# Patient Record
Sex: MALE | Race: OTHER | HISPANIC OR LATINO | URBAN - METROPOLITAN AREA
[De-identification: names, ages, dates, MRNs, and addresses within clinical notes are randomized per-mention and may not be internally consistent; named-entity substitution may affect disease eponyms.]

---

## 2019-12-09 ENCOUNTER — EMERGENCY (EMERGENCY)
Facility: HOSPITAL | Age: 74
LOS: 1 days | Discharge: HOME | End: 2019-12-09
Attending: EMERGENCY MEDICINE
Payer: COMMERCIAL

## 2019-12-09 VITALS
DIASTOLIC BLOOD PRESSURE: 73 MMHG | RESPIRATION RATE: 19 BRPM | SYSTOLIC BLOOD PRESSURE: 148 MMHG | TEMPERATURE: 98 F | HEART RATE: 89 BPM | OXYGEN SATURATION: 99 %

## 2019-12-09 VITALS
WEIGHT: 199.96 LBS | HEIGHT: 68 IN | TEMPERATURE: 98 F | SYSTOLIC BLOOD PRESSURE: 135 MMHG | HEART RATE: 82 BPM | OXYGEN SATURATION: 97 % | DIASTOLIC BLOOD PRESSURE: 78 MMHG | RESPIRATION RATE: 18 BRPM

## 2019-12-09 DIAGNOSIS — Z95.1 PRESENCE OF AORTOCORONARY BYPASS GRAFT: Chronic | ICD-10-CM

## 2019-12-09 DIAGNOSIS — Z98.89 OTHER SPECIFIED POSTPROCEDURAL STATES: Chronic | ICD-10-CM

## 2019-12-09 PROCEDURE — 99284 EMERGENCY DEPT VISIT MOD MDM: CPT

## 2019-12-09 PROCEDURE — 72125 CT NECK SPINE W/O DYE: CPT | Mod: 26

## 2019-12-09 PROCEDURE — 70486 CT MAXILLOFACIAL W/O DYE: CPT | Mod: 26

## 2019-12-09 PROCEDURE — 72125 CT NECK SPINE W/O DYE: CPT

## 2019-12-09 PROCEDURE — 70450 CT HEAD/BRAIN W/O DYE: CPT | Mod: 26

## 2019-12-09 PROCEDURE — 70450 CT HEAD/BRAIN W/O DYE: CPT

## 2019-12-09 PROCEDURE — 70486 CT MAXILLOFACIAL W/O DYE: CPT

## 2019-12-09 PROCEDURE — 96372 THER/PROPH/DIAG INJ SC/IM: CPT

## 2019-12-09 PROCEDURE — 99284 EMERGENCY DEPT VISIT MOD MDM: CPT | Mod: 25

## 2019-12-09 RX ORDER — KETOROLAC TROMETHAMINE 30 MG/ML
15 SYRINGE (ML) INJECTION ONCE
Refills: 0 | Status: DISCONTINUED | OUTPATIENT
Start: 2019-12-09 | End: 2019-12-09

## 2019-12-09 RX ORDER — DIAZEPAM 5 MG
2 TABLET ORAL ONCE
Refills: 0 | Status: DISCONTINUED | OUTPATIENT
Start: 2019-12-09 | End: 2019-12-09

## 2019-12-09 RX ADMIN — Medication 15 MILLIGRAM(S): at 13:38

## 2019-12-09 NOTE — ED ADULT NURSE NOTE - NSIMPLEMENTINTERV_GEN_ALL_ED
Implemented All Fall with Harm Risk Interventions:  [place] to call system. Call bell, personal items and telephone within reach. Instruct patient to call for assistance. Room bathroom lighting operational. Non-slip footwear when patient is off stretcher. Physically safe environment: no spills, clutter or unnecessary equipment. Stretcher in lowest position, wheels locked, appropriate side rails in place. Provide visual cue, wrist band, yellow gown, etc. Monitor gait and stability. Monitor for mental status changes and reorient to person, place, and time. Review medications for side effects contributing to fall risk. Reinforce activity limits and safety measures with patient and family. Provide visual clues: red socks.

## 2019-12-09 NOTE — ED PROVIDER NOTE - PATIENT PORTAL LINK FT
You can access the FollowMyHealth Patient Portal offered by [facility] by registering at the following website: [email or web address]. By joining [employer]’s FollowMyHealth portal, you will also be able to view your health information using other applications (apps) compatible with our system.

## 2019-12-09 NOTE — ED PROVIDER NOTE - OBJECTIVE STATEMENT
73 yo male with HTN HLD DM CABG poor dentition Afib on Plavix presents with jaw pain.  Pt states he tripped and fell and hit the left side of his face on a pole.  He denies No headache, nausea, vomiting, confusion, amnesia, but states he has pain when he eats and bites.

## 2019-12-09 NOTE — ED ADULT NURSE NOTE - OBJECTIVE STATEMENT
Patient present to Ed with c/o left side facial, jaw and ear pain since thursday. As per daughter as bedside, patient hit the left side of his face on rail in bathroom on thursday since then he has been having pain when chewing

## 2019-12-09 NOTE — ED ADULT TRIAGE NOTE - CHIEF COMPLAINT QUOTE
brought by daughter c/o pain  to Left  jaw , pain when chewing .reports hit Left side of the face to a [place] railing in a [employer] 4 days ago

## 2019-12-09 NOTE — ED ADULT NURSE NOTE - ED STAT RN HANDOFF DETAILS
Patient discharged home as per MD order. All discharge instructions and F/U visits provided to patient and family. Prescription sent to pharmacy. Both verbalizes understanding leaving ambulatory in no acute distress.

## 2021-01-14 ENCOUNTER — EMERGENCY (EMERGENCY)
Facility: HOSPITAL | Age: 76
LOS: 1 days | Discharge: HOME | End: 2021-01-14
Attending: EMERGENCY MEDICINE
Payer: COMMERCIAL

## 2021-01-14 VITALS
SYSTOLIC BLOOD PRESSURE: 203 MMHG | HEIGHT: 68 IN | RESPIRATION RATE: 20 BRPM | OXYGEN SATURATION: 96 % | DIASTOLIC BLOOD PRESSURE: 107 MMHG | WEIGHT: 220.02 LBS | HEART RATE: 91 BPM | TEMPERATURE: 98 F

## 2021-01-14 VITALS
OXYGEN SATURATION: 98 % | HEART RATE: 94 BPM | DIASTOLIC BLOOD PRESSURE: 80 MMHG | TEMPERATURE: 98 F | RESPIRATION RATE: 18 BRPM | SYSTOLIC BLOOD PRESSURE: 138 MMHG

## 2021-01-14 DIAGNOSIS — Z95.1 PRESENCE OF AORTOCORONARY BYPASS GRAFT: Chronic | ICD-10-CM

## 2021-01-14 DIAGNOSIS — Z98.89 OTHER SPECIFIED POSTPROCEDURAL STATES: Chronic | ICD-10-CM

## 2021-01-14 LAB
ALBUMIN SERPL ELPH-MCNC: 3.4 G/DL — LOW (ref 3.5–5)
ALP SERPL-CCNC: 216 U/L — HIGH (ref 40–120)
ALT FLD-CCNC: 24 U/L DA — SIGNIFICANT CHANGE UP (ref 10–60)
ANION GAP SERPL CALC-SCNC: 10 MMOL/L — SIGNIFICANT CHANGE UP (ref 5–17)
APTT BLD: 30.5 SEC — SIGNIFICANT CHANGE UP (ref 27.5–35.5)
AST SERPL-CCNC: 17 U/L — SIGNIFICANT CHANGE UP (ref 10–40)
BILIRUB SERPL-MCNC: 0.5 MG/DL — SIGNIFICANT CHANGE UP (ref 0.2–1.2)
BUN SERPL-MCNC: 20 MG/DL — HIGH (ref 7–18)
CALCIUM SERPL-MCNC: 8.9 MG/DL — SIGNIFICANT CHANGE UP (ref 8.4–10.5)
CHLORIDE SERPL-SCNC: 97 MMOL/L — SIGNIFICANT CHANGE UP (ref 96–108)
CO2 SERPL-SCNC: 24 MMOL/L — SIGNIFICANT CHANGE UP (ref 22–31)
CREAT SERPL-MCNC: 1.74 MG/DL — HIGH (ref 0.5–1.3)
GLUCOSE SERPL-MCNC: 360 MG/DL — HIGH (ref 70–99)
HCT VFR BLD CALC: 41 % — SIGNIFICANT CHANGE UP (ref 39–50)
HGB BLD-MCNC: 13.8 G/DL — SIGNIFICANT CHANGE UP (ref 13–17)
INR BLD: 0.95 RATIO — SIGNIFICANT CHANGE UP (ref 0.88–1.16)
MCHC RBC-ENTMCNC: 29.2 PG — SIGNIFICANT CHANGE UP (ref 27–34)
MCHC RBC-ENTMCNC: 33.7 GM/DL — SIGNIFICANT CHANGE UP (ref 32–36)
MCV RBC AUTO: 86.7 FL — SIGNIFICANT CHANGE UP (ref 80–100)
NRBC # BLD: 0 /100 WBCS — SIGNIFICANT CHANGE UP (ref 0–0)
PLATELET # BLD AUTO: 191 K/UL — SIGNIFICANT CHANGE UP (ref 150–400)
POTASSIUM SERPL-MCNC: 4.3 MMOL/L — SIGNIFICANT CHANGE UP (ref 3.5–5.3)
POTASSIUM SERPL-SCNC: 4.3 MMOL/L — SIGNIFICANT CHANGE UP (ref 3.5–5.3)
PROT SERPL-MCNC: 7.7 G/DL — SIGNIFICANT CHANGE UP (ref 6–8.3)
PROTHROM AB SERPL-ACNC: 11.3 SEC — SIGNIFICANT CHANGE UP (ref 10.6–13.6)
RBC # BLD: 4.73 M/UL — SIGNIFICANT CHANGE UP (ref 4.2–5.8)
RBC # FLD: 12.4 % — SIGNIFICANT CHANGE UP (ref 10.3–14.5)
SARS-COV-2 RNA SPEC QL NAA+PROBE: SIGNIFICANT CHANGE UP
SODIUM SERPL-SCNC: 131 MMOL/L — LOW (ref 135–145)
TROPONIN I SERPL-MCNC: 0.02 NG/ML — SIGNIFICANT CHANGE UP (ref 0–0.04)
TROPONIN I SERPL-MCNC: 0.02 NG/ML — SIGNIFICANT CHANGE UP (ref 0–0.04)
WBC # BLD: 5.6 K/UL — SIGNIFICANT CHANGE UP (ref 3.8–10.5)
WBC # FLD AUTO: 5.6 K/UL — SIGNIFICANT CHANGE UP (ref 3.8–10.5)

## 2021-01-14 PROCEDURE — 82962 GLUCOSE BLOOD TEST: CPT

## 2021-01-14 PROCEDURE — 86900 BLOOD TYPING SEROLOGIC ABO: CPT

## 2021-01-14 PROCEDURE — 85027 COMPLETE CBC AUTOMATED: CPT

## 2021-01-14 PROCEDURE — U0005: CPT

## 2021-01-14 PROCEDURE — 85610 PROTHROMBIN TIME: CPT

## 2021-01-14 PROCEDURE — 85730 THROMBOPLASTIN TIME PARTIAL: CPT

## 2021-01-14 PROCEDURE — 86850 RBC ANTIBODY SCREEN: CPT

## 2021-01-14 PROCEDURE — 84484 ASSAY OF TROPONIN QUANT: CPT

## 2021-01-14 PROCEDURE — 87635 SARS-COV-2 COVID-19 AMP PRB: CPT

## 2021-01-14 PROCEDURE — 86901 BLOOD TYPING SEROLOGIC RH(D): CPT

## 2021-01-14 PROCEDURE — 71045 X-RAY EXAM CHEST 1 VIEW: CPT | Mod: 26

## 2021-01-14 PROCEDURE — 93005 ELECTROCARDIOGRAM TRACING: CPT

## 2021-01-14 PROCEDURE — 71045 X-RAY EXAM CHEST 1 VIEW: CPT

## 2021-01-14 PROCEDURE — 80053 COMPREHEN METABOLIC PANEL: CPT

## 2021-01-14 PROCEDURE — 99285 EMERGENCY DEPT VISIT HI MDM: CPT

## 2021-01-14 PROCEDURE — 99283 EMERGENCY DEPT VISIT LOW MDM: CPT | Mod: 25

## 2021-01-14 PROCEDURE — 36415 COLL VENOUS BLD VENIPUNCTURE: CPT

## 2021-01-14 RX ORDER — METFORMIN HYDROCHLORIDE 850 MG/1
1 TABLET ORAL
Qty: 0 | Refills: 0 | DISCHARGE
Start: 2021-01-14 | End: 2021-02-12

## 2021-01-14 RX ORDER — NITROGLYCERIN 6.5 MG
0.4 CAPSULE, EXTENDED RELEASE ORAL ONCE
Refills: 0 | Status: COMPLETED | OUTPATIENT
Start: 2021-01-14 | End: 2021-01-14

## 2021-01-14 RX ORDER — CLOPIDOGREL BISULFATE 75 MG/1
1 TABLET, FILM COATED ORAL
Qty: 14 | Refills: 0
Start: 2021-01-14 | End: 2021-01-27

## 2021-01-14 RX ORDER — METOPROLOL TARTRATE 50 MG
1 TABLET ORAL
Qty: 14 | Refills: 0
Start: 2021-01-14 | End: 2021-01-27

## 2021-01-14 RX ORDER — AMLODIPINE BESYLATE 2.5 MG/1
1 TABLET ORAL
Qty: 14 | Refills: 0
Start: 2021-01-14 | End: 2021-01-27

## 2021-01-14 RX ORDER — ROSUVASTATIN CALCIUM 5 MG/1
1 TABLET ORAL
Qty: 14 | Refills: 0
Start: 2021-01-14 | End: 2021-01-27

## 2021-01-14 RX ORDER — ISOSORBIDE MONONITRATE 60 MG/1
30 TABLET, EXTENDED RELEASE ORAL ONCE
Refills: 0 | Status: COMPLETED | OUTPATIENT
Start: 2021-01-14 | End: 2021-01-14

## 2021-01-14 RX ORDER — METFORMIN HYDROCHLORIDE 850 MG/1
1 TABLET ORAL
Qty: 60 | Refills: 0
Start: 2021-01-14 | End: 2021-02-12

## 2021-01-14 RX ADMIN — ISOSORBIDE MONONITRATE 30 MILLIGRAM(S): 60 TABLET, EXTENDED RELEASE ORAL at 09:41

## 2021-01-14 RX ADMIN — Medication 0.4 MILLIGRAM(S): at 08:31

## 2021-01-14 NOTE — ED PROVIDER NOTE - NSFOLLOWUPINSTRUCTIONS_ED_ALL_ED_FT
Nonspecific Chest Pain    Chest pain can be caused by many different conditions. Some causes of chest pain can be life-threatening. These will require treatment right away. Serious causes of chest pain include:    Heart attack. A tear in the body's main blood vessel. Redness and swelling (inflammation) around your heart. Blood clot in your lungs.    Other causes of chest pain may not be so serious. These include:  Heartburn. Anxiety or stress. Damage to bones or muscles in your chest. Lung infections.    Chest pain can feel like:  Pain or discomfort in your chest. Crushing, pressure, aching, or squeezing pain. Burning or tingling. Dull or sharp pain that is worse when you move, cough, or take a deep breath. Pain or discomfort that is also felt in your back, neck, jaw, shoulder, or arm, or pain that spreads to any of these areas. It is hard to know whether your pain is caused by something that is serious or something that is not so serious. So it is important to see your doctor right away if you have chest pain.    Follow these instructions at home:    Medicines     Take over-the-counter and prescription medicines only as told by your doctor. If you were prescribed an antibiotic medicine, take it as told by your doctor. Do not stop taking the antibiotic even if you start to feel better.    Lifestyle      Rest as told by your doctor. Do not use any products that contain nicotine or tobacco, such as cigarettes, e-cigarettes, and chewing tobacco. If you need help quitting, ask your doctor. Do not drink alcohol. Make lifestyle changes as told by your doctor. These may include:  Getting regular exercise. Ask your doctor what activities are safe for you. Eating a heart-healthy diet. A diet and nutrition specialist (dietitian) can help you to learn healthy eating options. Staying at a healthy weight. Treating diabetes or high blood pressure, if needed. Lowering your stress. Activities such as yoga and relaxation techniques can help.    General instructions     Pay attention to any changes in your symptoms. Tell your doctor about them or any new symptoms. Avoid any activities that cause chest pain. Keep all follow-up visits as told by your doctor. This is important. You may need more testing if your chest pain does not go away.     Contact a doctor if:  Your chest pain does not go away. You feel depressed. You have a fever.     Get help right away if:  Your chest pain is worse. You have a cough that gets worse, or you cough up blood. You have very bad (severe) pain in your belly (abdomen). You pass out (faint).     You have either of these for no clear reason:  Sudden chest discomfort. Sudden discomfort in your arms, back, neck, or jaw. You have shortness of breath at any time. You suddenly start to sweat, or your skin gets clammy. You feel sick to your stomach (nauseous). You throw up (vomit). You suddenly feel lightheaded or dizzy. You feel very weak or tired. Your heart starts to beat fast, or it feels like it is skipping beats. These symptoms may be an emergency. Do not wait to see if the symptoms will go away. Get medical help right away. Call your local emergency services (911 in the [place] Do not drive yourself to the hospital.     Summary  Chest pain can be caused by many different conditions. The cause may be serious and need treatment right away. If you have chest pain, see your doctor right away. Follow your doctor's instructions for taking medicines and making lifestyle changes. Keep all follow-up visits as told by your doctor. This includes visits for any further testing if your chest pain does not go away. Be sure to know the signs that show that your condition has become worse. Get help right away if you have these symptoms. This information is not intended to replace advice given to you by your health care provider. Make sure you discuss any questions you have with your health care provider.    Document Released: 06/05/2009 Document Revised: 06/20/2019 Document Reviewed: 06/20/2019  [employer] Interactive Patient Education © 2019 [employer]

## 2021-01-14 NOTE — ED PROVIDER NOTE - CLINICAL SUMMARY MEDICAL DECISION MAKING FREE TEXT BOX
Elderly M with CABG and CP. Hypertensive in triage. Exam as above. EKG with ischemic changes. Plan - cardiac monitor, labs, CXR, nitro, reassess.

## 2021-01-14 NOTE — ED ADULT TRIAGE NOTE - CHIEF COMPLAINT QUOTE
C/o left side chest pain, non-radiating.  Denies sob.  Difficulty walking this am.  Normally walks with a walker.  EKG/FS/Cardiac monitor requested in main ED

## 2021-01-14 NOTE — ED ADULT NURSE REASSESSMENT NOTE - NS ED NURSE REASSESS COMMENT FT1
1120 Pt is adamant to go home, refused second Troponin and repeat EKG , Dr Molina explained the risks of leaving AMA , including the risk of death and verbalized understanding.

## 2021-01-14 NOTE — ED PROVIDER NOTE - PMH
CAD (coronary artery disease)    DM (diabetes mellitus)    Glaucoma, angle-closure    HLD (hyperlipidemia)    HTN (hypertension)     negative...

## 2021-01-14 NOTE — ED PROVIDER NOTE - PROGRESS NOTE DETAILS
EKG - nsr, rate 83, lateral twi with ST depressions, QTc 451 labs - Na 131, Cr 1.7, glucose 360, trop 0.022  CXR - cardiomegaly  Recommended admission for ischemic EKG changes in high risk cardiac patient. Pt declined. I then recommended repeat trop and repeat EKG in ED. Pt declined. States he wants to go home. Discussed risks of leaving (death, permanent disability, etc) and benefits of staying (further eval and treatment); pt verbalized understanding. Signed AMA formed, witnessed by staff and placed in pt chart. Pt has capacity to make this decision. Discussed indications for patient return to ED. Patient understood. Called pt's daughter (Arabella) to assist with transportation home. She spoke with pt and convinced him to stay for repeat trop and repeat EKG. Repeat EKG unchanged Repeat trop negative. Pt denies CP or any symptoms at this time.   Discussed plan with pt's daughter who agrees with d/c and f/u with cardio. Now stating pt has not been on home meds for a few months due to pandemic, so home meds sent to pharmacy. Discussed indications for patient return to ED. Patient understood.

## 2021-01-14 NOTE — ED PROVIDER NOTE - PATIENT PORTAL LINK FT
You can access the FollowMyHealth Patient Portal offered by [facility] by registering at the following website: [email or web address]. By joining [employer]’s FollowMyHealth portal, you will also be able to view your health information using other applications (apps) compatible with our system. You can access the FollowMyHealth Patient Portal offered by [facility] by registering at the following website: [email or web address]. By joining [employer]’s FollowMyHealth portal, you will also be able to view your health information using other applications (apps) compatible with our system.

## 2021-01-14 NOTE — ED ADULT NURSE NOTE - OBJECTIVE STATEMENT
Pt states has left sided chest pain since 11 pm last night, pain is mild, constant , non radiating  Denies difficulty breathing, diaphoresis, nausea or vomiting  Has a Hx of triple bypass 11 years ago

## 2021-01-14 NOTE — ED PROVIDER NOTE - OBJECTIVE STATEMENT
76 yo M pmh of HTN HLD CABG DM presents with L sided CP x 9 hours, constant, started while in bed, non exertional, non radiating, non pleuritic. Took advil at home w/o relief. Pt unsure if he took his BP meds this morning. Denies fever, cough, back pain, other acute complaints.

## 2021-08-14 ENCOUNTER — INPATIENT (INPATIENT)
Facility: HOSPITAL | Age: 76
LOS: 9 days | Discharge: HOME | DRG: 617 | End: 2021-08-24
Attending: INTERNAL MEDICINE | Admitting: INTERNAL MEDICINE
Payer: COMMERCIAL

## 2021-08-14 VITALS
HEART RATE: 98 BPM | SYSTOLIC BLOOD PRESSURE: 162 MMHG | HEIGHT: 68 IN | RESPIRATION RATE: 16 BRPM | TEMPERATURE: 99 F | DIASTOLIC BLOOD PRESSURE: 86 MMHG | WEIGHT: 191.8 LBS | OXYGEN SATURATION: 98 %

## 2021-08-14 DIAGNOSIS — I10 ESSENTIAL (PRIMARY) HYPERTENSION: ICD-10-CM

## 2021-08-14 DIAGNOSIS — E11.621 TYPE 2 DIABETES MELLITUS WITH FOOT ULCER: ICD-10-CM

## 2021-08-14 DIAGNOSIS — Z29.9 ENCOUNTER FOR PROPHYLACTIC MEASURES, UNSPECIFIED: ICD-10-CM

## 2021-08-14 DIAGNOSIS — Z95.1 PRESENCE OF AORTOCORONARY BYPASS GRAFT: Chronic | ICD-10-CM

## 2021-08-14 DIAGNOSIS — E78.5 HYPERLIPIDEMIA, UNSPECIFIED: ICD-10-CM

## 2021-08-14 DIAGNOSIS — H40.20X0 UNSPECIFIED PRIMARY ANGLE-CLOSURE GLAUCOMA, STAGE UNSPECIFIED: ICD-10-CM

## 2021-08-14 DIAGNOSIS — I25.10 ATHEROSCLEROTIC HEART DISEASE OF NATIVE CORONARY ARTERY WITHOUT ANGINA PECTORIS: ICD-10-CM

## 2021-08-14 DIAGNOSIS — Z98.89 OTHER SPECIFIED POSTPROCEDURAL STATES: Chronic | ICD-10-CM

## 2021-08-14 DIAGNOSIS — E11.9 TYPE 2 DIABETES MELLITUS WITHOUT COMPLICATIONS: ICD-10-CM

## 2021-08-14 LAB
ANION GAP SERPL CALC-SCNC: 8 MMOL/L — SIGNIFICANT CHANGE UP (ref 5–17)
BASOPHILS # BLD AUTO: 0.04 K/UL — SIGNIFICANT CHANGE UP (ref 0–0.2)
BASOPHILS NFR BLD AUTO: 0.5 % — SIGNIFICANT CHANGE UP (ref 0–2)
BUN SERPL-MCNC: 22 MG/DL — HIGH (ref 7–18)
CALCIUM SERPL-MCNC: 8.8 MG/DL — SIGNIFICANT CHANGE UP (ref 8.4–10.5)
CHLORIDE SERPL-SCNC: 104 MMOL/L — SIGNIFICANT CHANGE UP (ref 96–108)
CK SERPL-CCNC: 88 U/L — SIGNIFICANT CHANGE UP (ref 35–232)
CO2 SERPL-SCNC: 23 MMOL/L — SIGNIFICANT CHANGE UP (ref 22–31)
CREAT SERPL-MCNC: 1.59 MG/DL — HIGH (ref 0.5–1.3)
EOSINOPHIL # BLD AUTO: 0.11 K/UL — SIGNIFICANT CHANGE UP (ref 0–0.5)
EOSINOPHIL NFR BLD AUTO: 1.3 % — SIGNIFICANT CHANGE UP (ref 0–6)
GLUCOSE BLDC GLUCOMTR-MCNC: 250 MG/DL — HIGH (ref 70–99)
GLUCOSE BLDC GLUCOMTR-MCNC: 305 MG/DL — HIGH (ref 70–99)
GLUCOSE SERPL-MCNC: 311 MG/DL — HIGH (ref 70–99)
HCT VFR BLD CALC: 37.7 % — LOW (ref 39–50)
HGB BLD-MCNC: 12.7 G/DL — LOW (ref 13–17)
IMM GRANULOCYTES NFR BLD AUTO: 0.6 % — SIGNIFICANT CHANGE UP (ref 0–1.5)
LYMPHOCYTES # BLD AUTO: 1.33 K/UL — SIGNIFICANT CHANGE UP (ref 1–3.3)
LYMPHOCYTES # BLD AUTO: 15.6 % — SIGNIFICANT CHANGE UP (ref 13–44)
MCHC RBC-ENTMCNC: 29.5 PG — SIGNIFICANT CHANGE UP (ref 27–34)
MCHC RBC-ENTMCNC: 33.7 GM/DL — SIGNIFICANT CHANGE UP (ref 32–36)
MCV RBC AUTO: 87.5 FL — SIGNIFICANT CHANGE UP (ref 80–100)
MONOCYTES # BLD AUTO: 0.52 K/UL — SIGNIFICANT CHANGE UP (ref 0–0.9)
MONOCYTES NFR BLD AUTO: 6.1 % — SIGNIFICANT CHANGE UP (ref 2–14)
NEUTROPHILS # BLD AUTO: 6.46 K/UL — SIGNIFICANT CHANGE UP (ref 1.8–7.4)
NEUTROPHILS NFR BLD AUTO: 75.9 % — SIGNIFICANT CHANGE UP (ref 43–77)
NRBC # BLD: 0 /100 WBCS — SIGNIFICANT CHANGE UP (ref 0–0)
PLATELET # BLD AUTO: 206 K/UL — SIGNIFICANT CHANGE UP (ref 150–400)
POTASSIUM SERPL-MCNC: 4.5 MMOL/L — SIGNIFICANT CHANGE UP (ref 3.5–5.3)
POTASSIUM SERPL-SCNC: 4.5 MMOL/L — SIGNIFICANT CHANGE UP (ref 3.5–5.3)
RBC # BLD: 4.31 M/UL — SIGNIFICANT CHANGE UP (ref 4.2–5.8)
RBC # FLD: 13.1 % — SIGNIFICANT CHANGE UP (ref 10.3–14.5)
SARS-COV-2 RNA SPEC QL NAA+PROBE: SIGNIFICANT CHANGE UP
SODIUM SERPL-SCNC: 135 MMOL/L — SIGNIFICANT CHANGE UP (ref 135–145)
TROPONIN I SERPL-MCNC: <0.015 NG/ML — SIGNIFICANT CHANGE UP (ref 0–0.04)
WBC # BLD: 8.51 K/UL — SIGNIFICANT CHANGE UP (ref 3.8–10.5)
WBC # FLD AUTO: 8.51 K/UL — SIGNIFICANT CHANGE UP (ref 3.8–10.5)

## 2021-08-14 PROCEDURE — 99285 EMERGENCY DEPT VISIT HI MDM: CPT

## 2021-08-14 PROCEDURE — 73630 X-RAY EXAM OF FOOT: CPT | Mod: 26,RT

## 2021-08-14 RX ORDER — SODIUM CHLORIDE 9 MG/ML
1000 INJECTION, SOLUTION INTRAVENOUS
Refills: 0 | Status: DISCONTINUED | OUTPATIENT
Start: 2021-08-14 | End: 2021-08-19

## 2021-08-14 RX ORDER — PIPERACILLIN AND TAZOBACTAM 4; .5 G/20ML; G/20ML
3.38 INJECTION, POWDER, LYOPHILIZED, FOR SOLUTION INTRAVENOUS ONCE
Refills: 0 | Status: COMPLETED | OUTPATIENT
Start: 2021-08-14 | End: 2021-08-14

## 2021-08-14 RX ORDER — PIPERACILLIN AND TAZOBACTAM 4; .5 G/20ML; G/20ML
3.38 INJECTION, POWDER, LYOPHILIZED, FOR SOLUTION INTRAVENOUS EVERY 8 HOURS
Refills: 0 | Status: DISCONTINUED | OUTPATIENT
Start: 2021-08-14 | End: 2021-08-14

## 2021-08-14 RX ORDER — VANCOMYCIN HCL 1 G
1000 VIAL (EA) INTRAVENOUS ONCE
Refills: 0 | Status: COMPLETED | OUTPATIENT
Start: 2021-08-14 | End: 2021-08-14

## 2021-08-14 RX ORDER — DEXTROSE 50 % IN WATER 50 %
25 SYRINGE (ML) INTRAVENOUS ONCE
Refills: 0 | Status: DISCONTINUED | OUTPATIENT
Start: 2021-08-14 | End: 2021-08-19

## 2021-08-14 RX ORDER — ATORVASTATIN CALCIUM 80 MG/1
40 TABLET, FILM COATED ORAL AT BEDTIME
Refills: 0 | Status: DISCONTINUED | OUTPATIENT
Start: 2021-08-14 | End: 2021-08-19

## 2021-08-14 RX ORDER — INSULIN LISPRO 100/ML
VIAL (ML) SUBCUTANEOUS
Refills: 0 | Status: DISCONTINUED | OUTPATIENT
Start: 2021-08-14 | End: 2021-08-19

## 2021-08-14 RX ORDER — GLUCAGON INJECTION, SOLUTION 0.5 MG/.1ML
1 INJECTION, SOLUTION SUBCUTANEOUS ONCE
Refills: 0 | Status: DISCONTINUED | OUTPATIENT
Start: 2021-08-14 | End: 2021-08-19

## 2021-08-14 RX ORDER — HYDROMORPHONE HYDROCHLORIDE 2 MG/ML
0.5 INJECTION INTRAMUSCULAR; INTRAVENOUS; SUBCUTANEOUS
Refills: 0 | Status: DISCONTINUED | OUTPATIENT
Start: 2021-08-14 | End: 2021-08-16

## 2021-08-14 RX ORDER — HEPARIN SODIUM 5000 [USP'U]/ML
5000 INJECTION INTRAVENOUS; SUBCUTANEOUS EVERY 8 HOURS
Refills: 0 | Status: DISCONTINUED | OUTPATIENT
Start: 2021-08-14 | End: 2021-08-18

## 2021-08-14 RX ORDER — ASPIRIN/CALCIUM CARB/MAGNESIUM 324 MG
81 TABLET ORAL DAILY
Refills: 0 | Status: DISCONTINUED | OUTPATIENT
Start: 2021-08-14 | End: 2021-08-19

## 2021-08-14 RX ORDER — VANCOMYCIN HCL 1 G
1250 VIAL (EA) INTRAVENOUS EVERY 24 HOURS
Refills: 0 | Status: DISCONTINUED | OUTPATIENT
Start: 2021-08-14 | End: 2021-08-14

## 2021-08-14 RX ORDER — AMPICILLIN SODIUM AND SULBACTAM SODIUM 250; 125 MG/ML; MG/ML
3 INJECTION, POWDER, FOR SUSPENSION INTRAMUSCULAR; INTRAVENOUS EVERY 6 HOURS
Refills: 0 | Status: DISCONTINUED | OUTPATIENT
Start: 2021-08-14 | End: 2021-08-14

## 2021-08-14 RX ORDER — METOPROLOL TARTRATE 50 MG
50 TABLET ORAL ONCE
Refills: 0 | Status: COMPLETED | OUTPATIENT
Start: 2021-08-14 | End: 2021-08-14

## 2021-08-14 RX ORDER — METOPROLOL TARTRATE 50 MG
50 TABLET ORAL DAILY
Refills: 0 | Status: DISCONTINUED | OUTPATIENT
Start: 2021-08-14 | End: 2021-08-17

## 2021-08-14 RX ORDER — HYDROMORPHONE HYDROCHLORIDE 2 MG/ML
1 INJECTION INTRAMUSCULAR; INTRAVENOUS; SUBCUTANEOUS
Refills: 0 | Status: DISCONTINUED | OUTPATIENT
Start: 2021-08-14 | End: 2021-08-16

## 2021-08-14 RX ORDER — DEXTROSE 50 % IN WATER 50 %
12.5 SYRINGE (ML) INTRAVENOUS ONCE
Refills: 0 | Status: DISCONTINUED | OUTPATIENT
Start: 2021-08-14 | End: 2021-08-19

## 2021-08-14 RX ORDER — DEXTROSE 50 % IN WATER 50 %
15 SYRINGE (ML) INTRAVENOUS ONCE
Refills: 0 | Status: DISCONTINUED | OUTPATIENT
Start: 2021-08-14 | End: 2021-08-19

## 2021-08-14 RX ADMIN — ATORVASTATIN CALCIUM 40 MILLIGRAM(S): 80 TABLET, FILM COATED ORAL at 22:50

## 2021-08-14 RX ADMIN — HYDROMORPHONE HYDROCHLORIDE 1 MILLIGRAM(S): 2 INJECTION INTRAMUSCULAR; INTRAVENOUS; SUBCUTANEOUS at 16:48

## 2021-08-14 RX ADMIN — Medication 250 MILLIGRAM(S): at 10:12

## 2021-08-14 RX ADMIN — Medication 4: at 23:54

## 2021-08-14 RX ADMIN — HYDROMORPHONE HYDROCHLORIDE 1 MILLIGRAM(S): 2 INJECTION INTRAMUSCULAR; INTRAVENOUS; SUBCUTANEOUS at 18:24

## 2021-08-14 RX ADMIN — PIPERACILLIN AND TAZOBACTAM 200 GRAM(S): 4; .5 INJECTION, POWDER, LYOPHILIZED, FOR SOLUTION INTRAVENOUS at 09:45

## 2021-08-14 RX ADMIN — Medication 50 MILLIGRAM(S): at 23:57

## 2021-08-14 RX ADMIN — HEPARIN SODIUM 5000 UNIT(S): 5000 INJECTION INTRAVENOUS; SUBCUTANEOUS at 22:50

## 2021-08-14 RX ADMIN — Medication 81 MILLIGRAM(S): at 15:16

## 2021-08-14 NOTE — H&P ADULT - PROBLEM SELECTOR PLAN 2
- h/o CAD (s/p CABG >10yr ago), non-compliant w/ medications  - EKG showed NSTEMI, but no changes from jan2021 EKG  - c/w ASA, statin, metoprolol - h/o CAD (s/p CABG >10yr ago), non-compliant w/ medications  - EKG showed NSTEMI, but no changes from jan2021 EKG  - trop 0.22>0.22  - c/w ASA, statin, metoprolol    - Cardio, Dr. Wilkins, consulted

## 2021-08-14 NOTE — CONSULT NOTE ADULT - SUBJECTIVE AND OBJECTIVE BOX
Patient is a 76y old  Male who presents with a chief complaint of diabetic ulcer (14 Aug 2021 13:11)    Podiatry HPI: Podiatry consulted regarding 77 yo male patient who presents to ED with a chief complaint of right foot plantar ulcer. Patient states that he noticed the ulcer about 3 weeks ago. Denies any trauma or injury. He recalls stepping on a dog treat and isn't sure if that caused an opening on the bottom of his right foot. Patient states that he has been diagnosed with diabetes for a long time. Patient reports that the ulcer became painful and can hardly ambulate due to pain. Pt rates the pain 10/10 on the VAS. Denies other pedal complaints. Denies constitutional symptoms of N/V/C/F/Sob.     HPI:  Patient is a 76yoM, ambulates and performs ADL independently, w/ PMH of CAD (s/p CABG >10yrs ago), DM, HTN, and HLD, presents with right foot pain x2 weeks. He reports 10/10, progressive, intermittent, sharp, non-radiating, right foot pain. He states stepping on a dog treat approximately 3 weeks ago, which he notice a development of wound at the time. Right foot pain developed a week later. It was mild initially, but progressive has gotten worsen. Patient has been non-compliant with medications for the past 2 months, because he has been feeling great. He denies fever, chills, n/v, dizziness, chest pain, sob, abdominal pain, urinary or bowel movement changes.  (14 Aug 2021 13:11)      PMH: HTN (hypertension)    HLD (hyperlipidemia)    DM (diabetes mellitus)    CAD (coronary artery disease)    Glaucoma, angle-closure    Allergies: No Known Allergies    Medications:   aspirin  chewable 81 milliGRAM(s) Oral daily  atorvastatin 40 milliGRAM(s) Oral at bedtime  dextrose 40% Gel 15 Gram(s) Oral once  dextrose 5%. 1000 milliLiter(s) IV Continuous <Continuous>  dextrose 5%. 1000 milliLiter(s) IV Continuous <Continuous>  dextrose 50% Injectable 25 Gram(s) IV Push once  dextrose 50% Injectable 12.5 Gram(s) IV Push once  dextrose 50% Injectable 25 Gram(s) IV Push once  glucagon  Injectable 1 milliGRAM(s) IntraMuscular once  heparin   Injectable 5000 Unit(s) SubCutaneous every 8 hours  HYDROmorphone  Injectable 0.5 milliGRAM(s) IV Push every 3 hours PRN  HYDROmorphone  Injectable 1 milliGRAM(s) IV Push every 3 hours PRN  insulin lispro (ADMELOG) corrective regimen sliding scale   SubCutaneous Before meals and at bedtime  metoprolol succinate ER 50 milliGRAM(s) Oral daily  piperacillin/tazobactam IVPB.. 3.375 Gram(s) IV Intermittent every 8 hours  vancomycin  IVPB 1250 milliGRAM(s) IV Intermittent every 24 hours    FH: Family history of acute myocardial infarction (Father)    Family history of diabetes mellitus (Mother, Sibling)    Family history of hypertension (Mother, Sibling)    Family history of hyperlipidemia (Mother, Sibling)      PSX: No significant past surgical history    S/P CABG (coronary artery bypass graft)    History of thyroid surgery      SH: Social History:  EtOH: none  Cigarette: none  Illicit drug: none (14 Aug 2021 13:11)      Vital Signs Last 24 Hrs  T(C): 36.8 (14 Aug 2021 11:18), Max: 37 (14 Aug 2021 07:24)  T(F): 98.3 (14 Aug 2021 11:18), Max: 98.6 (14 Aug 2021 07:24)  HR: 95 (14 Aug 2021 11:18) (95 - 98)  BP: 153/80 (14 Aug 2021 11:18) (153/80 - 162/86)  BP(mean): --  RR: 17 (14 Aug 2021 11:18) (16 - 17)  SpO2: 98% (14 Aug 2021 11:18) (98% - 98%)    LABS                        12.7   8.51  )-----------( 206      ( 14 Aug 2021 09:43 )             37.7                08-14    135  |  104  |  22<H>  ----------------------------<  311<H>  4.5   |  23  |  1.59<H>    Ca    8.8      14 Aug 2021 09:43    WBC Count: 8.51 K/uL (08-14-21 @ 09:43)    CAPILLARY BLOOD GLUCOSE    POCT Blood Glucose.: 281 mg/dL (14 Aug 2021 07:29)      ROS  REVIEW OF SYSTEMS: All others negative unless stated otherwise in the HPI         PHYSICAL EXAM  GEN: SHER ROBERT is a pleasant well-nourished, well developed 76y Male in no acute distress, alert awake, and oriented to person, place and time.   LE Focused:    Vasc: DP/PT 2/4 on the left, 1/4 on the right, CFT brisk to all digits, TG warm to warm on the LLE, localized edema and erythema on the lateral dorsal aspect of the right foot  Derm: Ulceration noted on submet 5 of right foot with macerated periwound and fibrotic base, measuring 0.9 x 0.5 cm. + PTB, no malodor, no tunneling, no purulent drainage upon expression  Neuro: Protective sensation and epicritic sensation grossly diminished b/l  MSK: Pain localized to the lateral aspect of the right forefoot, able to move extremities in all compartments         Imaging:   x ray of the left foot   EXAM:  XR FOOT COMP MIN 3 VIEWS RT                          PROCEDURE DATE:  08/14/2021      INTERPRETATION:  Diabetic foot ulcer cellulitis.    3 views right foot.    IMPRESSION: No fracture dislocation or focal bone destruction. No unusual periosteal reaction. Joint spaces preserved. Calcaneal enthesopathy. Extensive calcification/ossification in the region of the plantar fascia . Small vessel calcification. Diffuse soft tissue swelling may reflect cellulitis. No soft tissue gas.          Microbiology  Culture of the right plantar wound  pending  Patient is a 76y old  Male who presents with a chief complaint of diabetic ulcer (14 Aug 2021 13:11)    Podiatry HPI: Podiatry consulted regarding 75 yo male patient who presents to ED with a chief complaint of right foot plantar ulcer. Patient states that he noticed the ulcer about 3 weeks ago. Denies any trauma or injury. He recalls stepping on a dog treat and isn't sure if that caused an opening on the bottom of his right foot. Patient states that he has been diagnosed with diabetes for a long time. Patient reports that the ulcer became painful and can hardly ambulate due to pain. Pt rates the pain 10/10 on the VAS. Denies other pedal complaints. Denies constitutional symptoms of N/V/C/F/Sob.     HPI:  Patient is a 76yoM, ambulates and performs ADL independently, w/ PMH of CAD (s/p CABG >10yrs ago), DM, HTN, and HLD, presents with right foot pain x2 weeks. He reports 10/10, progressive, intermittent, sharp, non-radiating, right foot pain. He states stepping on a dog treat approximately 3 weeks ago, which he notice a development of wound at the time. Right foot pain developed a week later. It was mild initially, but progressive has gotten worsen. Patient has been non-compliant with medications for the past 2 months, because he has been feeling great. He denies fever, chills, n/v, dizziness, chest pain, sob, abdominal pain, urinary or bowel movement changes.  (14 Aug 2021 13:11)      PMH: HTN (hypertension)    HLD (hyperlipidemia)    DM (diabetes mellitus)    CAD (coronary artery disease)    Glaucoma, angle-closure    Allergies: No Known Allergies    Medications:   aspirin  chewable 81 milliGRAM(s) Oral daily  atorvastatin 40 milliGRAM(s) Oral at bedtime  dextrose 40% Gel 15 Gram(s) Oral once  dextrose 5%. 1000 milliLiter(s) IV Continuous <Continuous>  dextrose 5%. 1000 milliLiter(s) IV Continuous <Continuous>  dextrose 50% Injectable 25 Gram(s) IV Push once  dextrose 50% Injectable 12.5 Gram(s) IV Push once  dextrose 50% Injectable 25 Gram(s) IV Push once  glucagon  Injectable 1 milliGRAM(s) IntraMuscular once  heparin   Injectable 5000 Unit(s) SubCutaneous every 8 hours  HYDROmorphone  Injectable 0.5 milliGRAM(s) IV Push every 3 hours PRN  HYDROmorphone  Injectable 1 milliGRAM(s) IV Push every 3 hours PRN  insulin lispro (ADMELOG) corrective regimen sliding scale   SubCutaneous Before meals and at bedtime  metoprolol succinate ER 50 milliGRAM(s) Oral daily  piperacillin/tazobactam IVPB.. 3.375 Gram(s) IV Intermittent every 8 hours  vancomycin  IVPB 1250 milliGRAM(s) IV Intermittent every 24 hours    FH: Family history of acute myocardial infarction (Father)    Family history of diabetes mellitus (Mother, Sibling)    Family history of hypertension (Mother, Sibling)    Family history of hyperlipidemia (Mother, Sibling)      PSX: No significant past surgical history    S/P CABG (coronary artery bypass graft)    History of thyroid surgery      SH: Social History:  EtOH: none  Cigarette: none  Illicit drug: none (14 Aug 2021 13:11)      Vital Signs Last 24 Hrs  T(C): 36.8 (14 Aug 2021 11:18), Max: 37 (14 Aug 2021 07:24)  T(F): 98.3 (14 Aug 2021 11:18), Max: 98.6 (14 Aug 2021 07:24)  HR: 95 (14 Aug 2021 11:18) (95 - 98)  BP: 153/80 (14 Aug 2021 11:18) (153/80 - 162/86)  BP(mean): --  RR: 17 (14 Aug 2021 11:18) (16 - 17)  SpO2: 98% (14 Aug 2021 11:18) (98% - 98%)    LABS                        12.7   8.51  )-----------( 206      ( 14 Aug 2021 09:43 )             37.7                08-14    135  |  104  |  22<H>  ----------------------------<  311<H>  4.5   |  23  |  1.59<H>    Ca    8.8      14 Aug 2021 09:43    WBC Count: 8.51 K/uL (08-14-21 @ 09:43)    CAPILLARY BLOOD GLUCOSE    POCT Blood Glucose.: 281 mg/dL (14 Aug 2021 07:29)      ROS  REVIEW OF SYSTEMS: All others negative unless stated otherwise in the HPI         PHYSICAL EXAM  GEN: SHER ROBERT is a pleasant well-nourished, well developed 76y Male in no acute distress, alert awake, and oriented to person, place and time.   LE Focused:    Vasc: DP/PT 2/4 on the left, 1/4 on the right, CFT brisk to all digits, TG warm to warm on the LLE, localized edema and erythema on the lateral dorsal aspect of the right foot  Derm: Ulceration noted on submet 5 of right foot with macerated periwound and fibrotic base, measuring 0.9 x 0.5 cm. + PTB, no malodor, no tunneling, no purulent drainage upon expression  Neuro: Protective sensation and epicritic sensation grossly diminished b/l  MSK: Pain localized to the lateral aspect of the right forefoot, able to move extremities in all compartments         Imaging:   x ray of the left foot   EXAM:  XR FOOT COMP MIN 3 VIEWS RT                          PROCEDURE DATE:  08/14/2021      INTERPRETATION:  Diabetic foot ulcer cellulitis.    3 views right foot.    IMPRESSION: No fracture dislocation or focal bone destruction. No unusual periosteal reaction. Joint spaces preserved. Calcaneal enthesopathy. Extensive calcification/ossification in the region of the plantar fascia . Small vessel calcification. Diffuse soft tissue swelling may reflect cellulitis. No soft tissue gas.          Microbiology  Culture of the right plantar wound  pending

## 2021-08-14 NOTE — CONSULT NOTE ADULT - ASSESSMENT
Assessment  Diabetic foot ulcer Right  DM type 2  R/o Osteomyelitis       Plan  Patient evaluated and chart created  X ray reviewed - no soft tissue gas noted  Right plantar foot ulcer examined  A verbal consent was obtained  A sharp, excisional debridement of the right foot ulcer was performed down to and including subcutaneous tissue using a sterile 15 blade  Dressed the right foot wound with betadine and DSD   Obtained culture of the right foot wound   MRI of the right foot pending - rule out osteomyelitis  Ordered ESR, CRP   Recommend antibiotics per primary team management  Podiatry will follow while in house  Discussed with attending Dr. Vazquez

## 2021-08-14 NOTE — H&P ADULT - ATTENDING COMMENTS
Patient is a 76yoM, ambulates and performs ADL independently, w/ PMH of CAD (s/p CABG >10yrs ago), DM, HTN, and HLD, presents with right foot pain x2 weeks. He reports 10/10, progressive, intermittent, sharp, non-radiating, right foot pain. He states stepping on a dog treat approximately 3 weeks ago, which he notice a development of wound at the time. Right foot pain developed a week later. It was mild initially, but progressive has gotten worsen. Patient has been non-compliant with medications for the past 2 months, because he has been feeling great. He denies fever, chills, n/v, dizziness, chest pain, sob, abdominal pain, urinary or bowel movement changes.     # DIABETIC FOOT ULCER, CELLULITIS, SUSPECTED OSTEOMYELITIS RIGHT FOOT - NOTED XRAY, F/U MRI  - ON VANCOMYCIN, ZOSYN  - F/U WOUND CX, F/U BCX  - ID CONSULT, PODIATRY CONSULT    # ? OREN - S/P IVF, MONITOR CR, AVOID NEPHROTOXIC AGENTS    # DM - F/U HBA1C, SSI + FS    # CAD S/P CABG - PLACED ON ASA, STATIN, BB, F/U ECHOCARDIOGRAM  - CARDIOLOGY CONSULT    # HLD - STATIN, F/U LIPID PANEL    # GI AND DVT PPX Patient is a 76yoM, ambulates and performs ADL independently, w/ PMH of CAD (s/p CABG >10yrs ago), DM, HTN, and HLD, presents with right foot pain x2 weeks. He reports 10/10, progressive, intermittent, sharp, non-radiating, right foot pain. He states stepping on a dog treat approximately 3 weeks ago, which he notice a development of wound at the time. Right foot pain developed a week later. It was mild initially, but progressive has gotten worsen. Patient has been non-compliant with medications for the past 2 months, because he has been feeling great. He denies fever, chills, n/v, dizziness, chest pain, sob, abdominal pain, urinary or bowel movement changes.     # DIABETIC FOOT ULCER, CELLULITIS, SUSPECTED OSTEOMYELITIS RIGHT FOOT - NOTED XRAY, F/U MRI  - PER ID, HOLD ABX PENDING DEEP VX  - F/U WOUND CX, F/U BCX  - ID CONSULT, PODIATRY CONSULT    # ? OREN - S/P IVF, MONITOR CR, AVOID NEPHROTOXIC AGENTS    # DM - F/U HBA1C, SSI + FS    # CAD S/P CABG - PLACED ON ASA, STATIN, BB, F/U ECHOCARDIOGRAM  - CARDIOLOGY CONSULT    # HLD - STATIN, F/U LIPID PANEL    # GI AND DVT PPX

## 2021-08-14 NOTE — H&P ADULT - PROBLEM SELECTOR PLAN 5
- h/o glaucoma, not on any medications  - monitor for symptom  - recommend outpt f/u - h/o HLD, non-compliant w/ medications  - c/w statin  - f/u lipid panel

## 2021-08-14 NOTE — ED PROVIDER NOTE - CARE PLAN
1 Principal Discharge DX:	Diabetic ulcer of right foot  Secondary Diagnosis:	Cellulitis of foot without toes

## 2021-08-14 NOTE — ED PROVIDER NOTE - NSICDXFAMILYHX_GEN_ALL_CORE_FT
FAMILY HISTORY:  Father  Still living? Unknown  Family history of acute myocardial infarction, Age at diagnosis: Age Unknown    Mother  Still living? Unknown  Family history of diabetes mellitus, Age at diagnosis: Age Unknown  Family history of hyperlipidemia, Age at diagnosis: Age Unknown  Family history of hypertension, Age at diagnosis: Age Unknown    Sibling  Still living? Unknown  Family history of diabetes mellitus, Age at diagnosis: Age Unknown  Family history of hyperlipidemia, Age at diagnosis: Age Unknown  Family history of hypertension, Age at diagnosis: Age Unknown

## 2021-08-14 NOTE — ED PROVIDER NOTE - CLINICAL SUMMARY MEDICAL DECISION MAKING FREE TEXT BOX
Patient with infected diabetic foot ulcer. concern for osteomyelitis. Will admit for IV antibiotics.

## 2021-08-14 NOTE — H&P ADULT - ASSESSMENT
Patient is a 76yoM, ambulates and performs ADL independently, w/ PMH of CAD (s/p CABG >10yrs ago), DM, HTN, and HLD, presents with right foot pain x2 weeks. Patient is admitted for diabetic foot ulcer.

## 2021-08-14 NOTE — H&P ADULT - HISTORY OF PRESENT ILLNESS
Patient is a 76yoM, ambulates and performs ADL independently, w/ PMH of CAD (s/p CABG >10yrs ago), DM, HTN, and HLD, presents with right foot pain x2 weeks. He reports 10/10, progressive, intermittent, sharp, non-radiating, right foot pain. He states stepping on a dog treat approximately 3 weeks ago, which he notice a development of wound at the time. Right foot pain developed a week later. It was mild initially, but progressive has gotten worsen. He denies fever, chills, n/v, dizziness, chest pain, sob, abdominal pain, urinary or bowel movement changes.  Patient is a 76yoM, ambulates and performs ADL independently, w/ PMH of CAD (s/p CABG >10yrs ago), DM, HTN, and HLD, presents with right foot pain x2 weeks. He reports 10/10, progressive, intermittent, sharp, non-radiating, right foot pain. He states stepping on a dog treat approximately 3 weeks ago, which he notice a development of wound at the time. Right foot pain developed a week later. It was mild initially, but progressive has gotten worsen. Patient has been non-compliant with medications for the past 2 months, because he has been feeling great. He denies fever, chills, n/v, dizziness, chest pain, sob, abdominal pain, urinary or bowel movement changes.

## 2021-08-14 NOTE — H&P ADULT - PROBLEM SELECTOR PLAN 4
- h/o HTN, non-compliant w/ medications; previously on metoprolol 200mg qd, [id number] qd, and isosorbide 30mg qd  - c/w metoprolol 50mg for now  - resume other home meds as needed

## 2021-08-14 NOTE — H&P ADULT - BIRTH SEX
"Pt presents complaining of right hand/arm injuries incurred yesterday after sustaining a GLF.  She denies any anticoagulatory therapy.  Chief Complaint   Patient presents with   • T-5000 FALL   • Arm Injury   • Hand Injury     /84   Pulse 66   Temp 36.1 °C (97 °F) (Temporal)   Resp 20   Ht 1.6 m (5' 3\")   Wt 69 kg (152 lb 1.9 oz)   SpO2 92%   BMI 26.95 kg/m²      " Male

## 2021-08-14 NOTE — ED PROVIDER NOTE - OBJECTIVE STATEMENT
Patient and daughter c/o ulcer to bottom of right foot for 2 weeks. Became painful and swollen last night. No fever, cp, sob, ap, n/v, focal weakness, paresthesias. Patient has not seen PMD in over a year, has not had any medications in 2 months.

## 2021-08-14 NOTE — H&P ADULT - PROBLEM SELECTOR PLAN 6
- lovenox for dvt ppx - h/o glaucoma, not on any medications  - monitor for symptom  - recommend outpt f/u

## 2021-08-14 NOTE — CONSULT NOTE ADULT - SUBJECTIVE AND OBJECTIVE BOX
Patient is a 76y old  Male who ambulates and performs ADL independently, w/ PMH of CAD (s/p CABG >10yrs ago), DM, HTN, and HLD, now presents to the ER for evaluation of right foot pain x2 weeks.  He states stepping on a dog treat approximately 3 weeks ago, which he notice a development of wound at the time. Right foot pain developed a week later. It was mild initially, but progressive has gotten worsen. Patient has been non-compliant with medications for the past 2 months, because he has been feeling great. ON admission, he found to have No fever but tachycardia. He has started on Unasyn and Vancomycin, and the ID consult requested to assist with further evaluation and antibiotic management.      REVIEW OF SYSTEMS: Total of twelve systems have been reviewed with patient and found to be negative unless mentioned in HPI      PAST MEDICAL & SURGICAL HISTORY:  HTN (hypertension)  HLD (hyperlipidemia)  DM (diabetes mellitus)  CAD (coronary artery disease)  Glaucoma, angle-closur  S/P CABG (coronary artery bypass graft)  History of thyroid surgery        SOCIAL HISTORY  Alcohol: Does not drink  Tobacco: Does not smoke  Illicit substance use: None      FAMILY HISTORY: Non contributory to the present illness      ALLERGIES: No Known Allergies      Vital Signs Last 24 Hrs  T(C): 36.9 (14 Aug 2021 15:44), Max: 37 (14 Aug 2021 07:24)  T(F): 98.5 (14 Aug 2021 15:44), Max: 98.6 (14 Aug 2021 07:24)  HR: 103 (14 Aug 2021 15:44) (95 - 103)  BP: 184/94 (14 Aug 2021 15:44) (153/80 - 184/94)  BP(mean): --  RR: 17 (14 Aug 2021 15:44) (16 - 17)  SpO2: 99% (14 Aug 2021 15:44) (98% - 99%)      PHYSICAL EXAM:  GENERAL: Not in distress   CHEST/LUNG:  Aire ntry bilaterally  HEART: s1 and s2 present  ABDOMEN:  Nontender and  Nondistended  EXTREMITIES: No pedal  edema  CNS: Awake and Alert      LABS:                        12.7   8.51  )-----------( 206      ( 14 Aug 2021 09:43 )             37.7     08-14    135  |  104  |  22<H>  ----------------------------<  311<H>  4.5   |  23  |  1.59<H>    Ca    8.8      14 Aug 2021 09:43          CAPILLARY BLOOD GLUCOSE  POCT Blood Glucose.: 305 mg/dL (14 Aug 2021 16:20)  POCT Blood Glucose.: 281 mg/dL (14 Aug 2021 07:29)        MEDICATIONS  (STANDING):  aspirin  chewable 81 milliGRAM(s) Oral daily  atorvastatin 40 milliGRAM(s) Oral at bedtime  dextrose 40% Gel 15 Gram(s) Oral once  dextrose 5%. 1000 milliLiter(s) (50 mL/Hr) IV Continuous <Continuous>  dextrose 5%. 1000 milliLiter(s) (100 mL/Hr) IV Continuous <Continuous>  dextrose 50% Injectable 25 Gram(s) IV Push once  dextrose 50% Injectable 12.5 Gram(s) IV Push once  dextrose 50% Injectable 25 Gram(s) IV Push once  glucagon  Injectable 1 milliGRAM(s) IntraMuscular once  heparin   Injectable 5000 Unit(s) SubCutaneous every 8 hours  insulin lispro (ADMELOG) corrective regimen sliding scale   SubCutaneous Before meals and at bedtime  metoprolol succinate ER 50 milliGRAM(s) Oral daily    MEDICATIONS  (PRN):  HYDROmorphone  Injectable 0.5 milliGRAM(s) IV Push every 3 hours PRN Moderate Pain (4 - 6)  HYDROmorphone  Injectable 1 milliGRAM(s) IV Push every 3 hours PRN Severe Pain (7 - 10)        RADIOLOGY & ADDITIONAL TESTS:  < from: Xray Foot AP + Lateral + Oblique, Right (08.14.21 @ 09:51) >  IMPRESSION: No fracture dislocation or focal bone destruction. No unusual periosteal reaction. Joint spaces preserved. Calcaneal enthesopathy. Extensive calcification/ossification in the region of the plantar fascia . Small vessel calcification. Diffuse soft tissue swelling may reflect cellulitis. No soft tissue gas.        MICROBIOLOGY DATA:    miCOVID-19 PCR . (08.14.21 @ 09:41)   COVID-19 PCR: NotDetec:          Patient is a 76y old  Male who ambulates and performs ADL independently, w/ PMH of CAD (s/p CABG >10yrs ago), DM, HTN, and HLD, now presents to the ER for evaluation of right foot pain x2 weeks.  He states stepping on a dog treat approximately 3 weeks ago, which he notice a development of wound at the time. Right foot pain developed a week later. It was mild initially, but progressive has gotten worsen. Patient has been non-compliant with medications for the past 2 months, because he has been feeling great. ON admission, he found to have No fever but tachycardia. He has started on Unasyn and Vancomycin, and the ID consult requested to assist with further evaluation and antibiotic management.      REVIEW OF SYSTEMS: Total of twelve systems have been reviewed with patient and found to be negative unless mentioned in HPI      PAST MEDICAL & SURGICAL HISTORY:  HTN (hypertension)  HLD (hyperlipidemia)  DM (diabetes mellitus)  CAD (coronary artery disease)  Glaucoma, angle-closur  S/P CABG (coronary artery bypass graft)  History of thyroid surgery      SOCIAL HISTORY  Alcohol: Does not drink  Tobacco: Does not smoke  Illicit substance use: None      FAMILY HISTORY: Non contributory to the present illness      ALLERGIES: No Known Allergies      Vital Signs Last 24 Hrs  T(C): 36.9 (14 Aug 2021 15:44), Max: 37 (14 Aug 2021 07:24)  T(F): 98.5 (14 Aug 2021 15:44), Max: 98.6 (14 Aug 2021 07:24)  HR: 103 (14 Aug 2021 15:44) (95 - 103)  BP: 184/94 (14 Aug 2021 15:44) (153/80 - 184/94)  BP(mean): --  RR: 17 (14 Aug 2021 15:44) (16 - 17)  SpO2: 99% (14 Aug 2021 15:44) (98% - 99%)      PHYSICAL EXAM:  GENERAL: Not in distress   CHEST/LUNG:  Aire ntry bilaterally  HEART: s1 and s2 present  ABDOMEN:  Nontender and  Nondistended  EXTREMITIES: Right foot bandage in placed  CNS: Awake and Alert      LABS:                        12.7   8.51  )-----------( 206      ( 14 Aug 2021 09:43 )             37.7     08-14    135  |  104  |  22<H>  ----------------------------<  311<H>  4.5   |  23  |  1.59<H>    Ca    8.8      14 Aug 2021 09:43          CAPILLARY BLOOD GLUCOSE  POCT Blood Glucose.: 305 mg/dL (14 Aug 2021 16:20)  POCT Blood Glucose.: 281 mg/dL (14 Aug 2021 07:29)        MEDICATIONS  (STANDING):  aspirin  chewable 81 milliGRAM(s) Oral daily  atorvastatin 40 milliGRAM(s) Oral at bedtime  dextrose 40% Gel 15 Gram(s) Oral once  dextrose 5%. 1000 milliLiter(s) (50 mL/Hr) IV Continuous <Continuous>  dextrose 5%. 1000 milliLiter(s) (100 mL/Hr) IV Continuous <Continuous>  dextrose 50% Injectable 25 Gram(s) IV Push once  dextrose 50% Injectable 12.5 Gram(s) IV Push once  dextrose 50% Injectable 25 Gram(s) IV Push once  glucagon  Injectable 1 milliGRAM(s) IntraMuscular once  heparin   Injectable 5000 Unit(s) SubCutaneous every 8 hours  insulin lispro (ADMELOG) corrective regimen sliding scale   SubCutaneous Before meals and at bedtime  metoprolol succinate ER 50 milliGRAM(s) Oral daily    MEDICATIONS  (PRN):  HYDROmorphone  Injectable 0.5 milliGRAM(s) IV Push every 3 hours PRN Moderate Pain (4 - 6)  HYDROmorphone  Injectable 1 milliGRAM(s) IV Push every 3 hours PRN Severe Pain (7 - 10)        RADIOLOGY & ADDITIONAL TESTS:  < from: Xray Foot AP + Lateral + Oblique, Right (08.14.21 @ 09:51) >  IMPRESSION: No fracture dislocation or focal bone destruction. No unusual periosteal reaction. Joint spaces preserved. Calcaneal enthesopathy. Extensive calcification/ossification in the region of the plantar fascia . Small vessel calcification. Diffuse soft tissue swelling may reflect cellulitis. No soft tissue gas.        MICROBIOLOGY DATA:    miCOVID-19 PCR . (08.14.21 @ 09:41)   COVID-19 PCR: NotDetec:

## 2021-08-14 NOTE — ED ADULT NURSE NOTE - OBJECTIVE STATEMENT
axox2 , nad , R foot pain /redness x 3 weeks , no fever noted axox2  place and name ., nad , R foot pain /redness x 3 weeks , no fever noted

## 2021-08-14 NOTE — ED PROVIDER NOTE - NSICDXPASTMEDICALHX_GEN_ALL_CORE_FT
PAST MEDICAL HISTORY:  CAD (coronary artery disease)     DM (diabetes mellitus)     Glaucoma, angle-closure     HLD (hyperlipidemia)     HTN (hypertension)

## 2021-08-14 NOTE — CONSULT NOTE ADULT - ASSESSMENT
Patient is a 76y old  Male who ambulates and performs ADL independently, w/ PMH of CAD (s/p CABG >10yrs ago), DM, HTN, and HLD, now presents to the ER for evaluation of right foot pain x2 weeks.  He states stepping on a dog treat approximately 3 weeks ago, which he notice a development of wound at the time. Right foot pain developed a week later. It was mild initially, but progressive has gotten worsen. Patient has been non-compliant with medications for the past 2 months, because he has been feeling great. ON admission, he found to have No fever but tachycardia. He has started on Unasyn and Vancomycin, and the ID consult requested to assist with further evaluation and antibiotic management.    # Right DFU    would recommend:    1. Podiatry evaluation for deep culture since wound probe to bone  2. Hold off ABx  until deep culture obtained , since not septic now  3. ESR and  CRP    will follow the patient with you and make further recommendation based on the clinical course and Lab results  Thank you for the opportunity to participate in Mr. ROBERT's care      Attending Attestation:  Spent more than 65 minutes on total encounter, more than 50 % of the visit was spent counseling and/or coordinating care by the Attending physician.       Patient is a 76y old  Male who ambulates and performs ADL independently, w/ PMH of CAD (s/p CABG >10yrs ago), DM, HTN, and HLD, now presents to the ER for evaluation of right foot pain x2 weeks.  He states stepping on a dog treat approximately 3 weeks ago, which he notice a development of wound at the time. Right foot pain developed a week later. It was mild initially, but progressive has gotten worsen. Patient has been non-compliant with medications for the past 2 months, because he has been feeling great. ON admission, he found to have No fever but tachycardia. He has started on Unasyn and Vancomycin, and the ID consult requested to assist with further evaluation and antibiotic management.    # Right DFU    would recommend:    1. Podiatry evaluation for deep culture since wound probe to bone  2. Hold off ABx  until deep culture obtained , since not septic now  3. ESR and  CRP  4. MRI of Right foot to rule out OM    d/w Admitting Resident    will follow the patient with you and make further recommendation based on the clinical course and Lab results  Thank you for the opportunity to participate in Mr. ROBERT's care      Attending Attestation:  Spent more than 65 minutes on total encounter, more than 50 % of the visit was spent counseling and/or coordinating care by the Attending physician.

## 2021-08-15 LAB
A1C WITH ESTIMATED AVERAGE GLUCOSE RESULT: 11.4 % — HIGH (ref 4–5.6)
ALBUMIN SERPL ELPH-MCNC: 3.4 G/DL — LOW (ref 3.5–5)
ALP SERPL-CCNC: 145 U/L — HIGH (ref 40–120)
ALT FLD-CCNC: 16 U/L DA — SIGNIFICANT CHANGE UP (ref 10–60)
ANION GAP SERPL CALC-SCNC: 9 MMOL/L — SIGNIFICANT CHANGE UP (ref 5–17)
AST SERPL-CCNC: 11 U/L — SIGNIFICANT CHANGE UP (ref 10–40)
BASOPHILS # BLD AUTO: 0.04 K/UL — SIGNIFICANT CHANGE UP (ref 0–0.2)
BASOPHILS NFR BLD AUTO: 0.6 % — SIGNIFICANT CHANGE UP (ref 0–2)
BILIRUB SERPL-MCNC: 0.6 MG/DL — SIGNIFICANT CHANGE UP (ref 0.2–1.2)
BUN SERPL-MCNC: 25 MG/DL — HIGH (ref 7–18)
CALCIUM SERPL-MCNC: 8.6 MG/DL — SIGNIFICANT CHANGE UP (ref 8.4–10.5)
CHLORIDE SERPL-SCNC: 104 MMOL/L — SIGNIFICANT CHANGE UP (ref 96–108)
CHOLEST SERPL-MCNC: 155 MG/DL — SIGNIFICANT CHANGE UP
CO2 SERPL-SCNC: 24 MMOL/L — SIGNIFICANT CHANGE UP (ref 22–31)
COVID-19 SPIKE DOMAIN AB INTERP: POSITIVE
COVID-19 SPIKE DOMAIN ANTIBODY RESULT: >250 U/ML — HIGH
CREAT SERPL-MCNC: 1.62 MG/DL — HIGH (ref 0.5–1.3)
CRP SERPL-MCNC: 67 MG/L — HIGH
EOSINOPHIL # BLD AUTO: 0.11 K/UL — SIGNIFICANT CHANGE UP (ref 0–0.5)
EOSINOPHIL NFR BLD AUTO: 1.6 % — SIGNIFICANT CHANGE UP (ref 0–6)
ERYTHROCYTE [SEDIMENTATION RATE] IN BLOOD: 44 MM/HR — HIGH (ref 0–20)
ESTIMATED AVERAGE GLUCOSE: 280 MG/DL — HIGH (ref 68–114)
GLUCOSE BLDC GLUCOMTR-MCNC: 170 MG/DL — HIGH (ref 70–99)
GLUCOSE BLDC GLUCOMTR-MCNC: 196 MG/DL — HIGH (ref 70–99)
GLUCOSE BLDC GLUCOMTR-MCNC: 229 MG/DL — HIGH (ref 70–99)
GLUCOSE BLDC GLUCOMTR-MCNC: 275 MG/DL — HIGH (ref 70–99)
GLUCOSE SERPL-MCNC: 245 MG/DL — HIGH (ref 70–99)
HCT VFR BLD CALC: 36.5 % — LOW (ref 39–50)
HDLC SERPL-MCNC: 35 MG/DL — LOW
HGB BLD-MCNC: 12.2 G/DL — LOW (ref 13–17)
IMM GRANULOCYTES NFR BLD AUTO: 0.4 % — SIGNIFICANT CHANGE UP (ref 0–1.5)
LIPID PNL WITH DIRECT LDL SERPL: 94 MG/DL — SIGNIFICANT CHANGE UP
LYMPHOCYTES # BLD AUTO: 1.34 K/UL — SIGNIFICANT CHANGE UP (ref 1–3.3)
LYMPHOCYTES # BLD AUTO: 19.1 % — SIGNIFICANT CHANGE UP (ref 13–44)
MAGNESIUM SERPL-MCNC: 2.3 MG/DL — SIGNIFICANT CHANGE UP (ref 1.6–2.6)
MCHC RBC-ENTMCNC: 29.3 PG — SIGNIFICANT CHANGE UP (ref 27–34)
MCHC RBC-ENTMCNC: 33.4 GM/DL — SIGNIFICANT CHANGE UP (ref 32–36)
MCV RBC AUTO: 87.7 FL — SIGNIFICANT CHANGE UP (ref 80–100)
MONOCYTES # BLD AUTO: 0.42 K/UL — SIGNIFICANT CHANGE UP (ref 0–0.9)
MONOCYTES NFR BLD AUTO: 6 % — SIGNIFICANT CHANGE UP (ref 2–14)
NEUTROPHILS # BLD AUTO: 5.08 K/UL — SIGNIFICANT CHANGE UP (ref 1.8–7.4)
NEUTROPHILS NFR BLD AUTO: 72.3 % — SIGNIFICANT CHANGE UP (ref 43–77)
NON HDL CHOLESTEROL: 120 MG/DL — SIGNIFICANT CHANGE UP
NRBC # BLD: 0 /100 WBCS — SIGNIFICANT CHANGE UP (ref 0–0)
PHOSPHATE SERPL-MCNC: 3.2 MG/DL — SIGNIFICANT CHANGE UP (ref 2.5–4.5)
PLATELET # BLD AUTO: 215 K/UL — SIGNIFICANT CHANGE UP (ref 150–400)
POTASSIUM SERPL-MCNC: 4 MMOL/L — SIGNIFICANT CHANGE UP (ref 3.5–5.3)
POTASSIUM SERPL-SCNC: 4 MMOL/L — SIGNIFICANT CHANGE UP (ref 3.5–5.3)
PROT SERPL-MCNC: 7.1 G/DL — SIGNIFICANT CHANGE UP (ref 6–8.3)
RBC # BLD: 4.16 M/UL — LOW (ref 4.2–5.8)
RBC # FLD: 13.1 % — SIGNIFICANT CHANGE UP (ref 10.3–14.5)
SARS-COV-2 IGG+IGM SERPL QL IA: >250 U/ML — HIGH
SARS-COV-2 IGG+IGM SERPL QL IA: POSITIVE
SODIUM SERPL-SCNC: 137 MMOL/L — SIGNIFICANT CHANGE UP (ref 135–145)
TRIGL SERPL-MCNC: 131 MG/DL — SIGNIFICANT CHANGE UP
TSH SERPL-MCNC: 2.23 UU/ML — SIGNIFICANT CHANGE UP (ref 0.34–4.82)
WBC # BLD: 7.02 K/UL — SIGNIFICANT CHANGE UP (ref 3.8–10.5)
WBC # FLD AUTO: 7.02 K/UL — SIGNIFICANT CHANGE UP (ref 3.8–10.5)

## 2021-08-15 RX ORDER — SODIUM CHLORIDE 9 MG/ML
1000 INJECTION INTRAMUSCULAR; INTRAVENOUS; SUBCUTANEOUS
Refills: 0 | Status: DISCONTINUED | OUTPATIENT
Start: 2021-08-15 | End: 2021-08-18

## 2021-08-15 RX ORDER — INSULIN GLARGINE 100 [IU]/ML
7 INJECTION, SOLUTION SUBCUTANEOUS AT BEDTIME
Refills: 0 | Status: DISCONTINUED | OUTPATIENT
Start: 2021-08-15 | End: 2021-08-19

## 2021-08-15 RX ADMIN — INSULIN GLARGINE 7 UNIT(S): 100 INJECTION, SOLUTION SUBCUTANEOUS at 22:25

## 2021-08-15 RX ADMIN — HEPARIN SODIUM 5000 UNIT(S): 5000 INJECTION INTRAVENOUS; SUBCUTANEOUS at 22:26

## 2021-08-15 RX ADMIN — Medication 50 MILLIGRAM(S): at 05:15

## 2021-08-15 RX ADMIN — HYDROMORPHONE HYDROCHLORIDE 1 MILLIGRAM(S): 2 INJECTION INTRAMUSCULAR; INTRAVENOUS; SUBCUTANEOUS at 09:20

## 2021-08-15 RX ADMIN — Medication 2: at 17:33

## 2021-08-15 RX ADMIN — HYDROMORPHONE HYDROCHLORIDE 1 MILLIGRAM(S): 2 INJECTION INTRAMUSCULAR; INTRAVENOUS; SUBCUTANEOUS at 14:17

## 2021-08-15 RX ADMIN — Medication 4: at 22:25

## 2021-08-15 RX ADMIN — SODIUM CHLORIDE 75 MILLILITER(S): 9 INJECTION INTRAMUSCULAR; INTRAVENOUS; SUBCUTANEOUS at 22:26

## 2021-08-15 RX ADMIN — Medication 81 MILLIGRAM(S): at 11:56

## 2021-08-15 RX ADMIN — HEPARIN SODIUM 5000 UNIT(S): 5000 INJECTION INTRAVENOUS; SUBCUTANEOUS at 14:17

## 2021-08-15 RX ADMIN — HYDROMORPHONE HYDROCHLORIDE 1 MILLIGRAM(S): 2 INJECTION INTRAMUSCULAR; INTRAVENOUS; SUBCUTANEOUS at 17:33

## 2021-08-15 RX ADMIN — HYDROMORPHONE HYDROCHLORIDE 0.5 MILLIGRAM(S): 2 INJECTION INTRAMUSCULAR; INTRAVENOUS; SUBCUTANEOUS at 05:30

## 2021-08-15 RX ADMIN — Medication 6: at 08:11

## 2021-08-15 RX ADMIN — Medication 2: at 11:56

## 2021-08-15 RX ADMIN — HYDROMORPHONE HYDROCHLORIDE 0.5 MILLIGRAM(S): 2 INJECTION INTRAMUSCULAR; INTRAVENOUS; SUBCUTANEOUS at 05:15

## 2021-08-15 RX ADMIN — HYDROMORPHONE HYDROCHLORIDE 1 MILLIGRAM(S): 2 INJECTION INTRAMUSCULAR; INTRAVENOUS; SUBCUTANEOUS at 11:57

## 2021-08-15 RX ADMIN — HEPARIN SODIUM 5000 UNIT(S): 5000 INJECTION INTRAVENOUS; SUBCUTANEOUS at 05:15

## 2021-08-15 RX ADMIN — HYDROMORPHONE HYDROCHLORIDE 1 MILLIGRAM(S): 2 INJECTION INTRAMUSCULAR; INTRAVENOUS; SUBCUTANEOUS at 22:25

## 2021-08-15 RX ADMIN — ATORVASTATIN CALCIUM 40 MILLIGRAM(S): 80 TABLET, FILM COATED ORAL at 22:26

## 2021-08-15 RX ADMIN — HYDROMORPHONE HYDROCHLORIDE 1 MILLIGRAM(S): 2 INJECTION INTRAMUSCULAR; INTRAVENOUS; SUBCUTANEOUS at 23:15

## 2021-08-15 NOTE — PROGRESS NOTE ADULT - ASSESSMENT
Patient is a 76y old  Male who ambulates and performs ADL independently, w/ PMH of CAD (s/p CABG >10yrs ago), DM, HTN, and HLD, now presents to the ER for evaluation of right foot pain x2 weeks.  He states stepping on a dog treat approximately 3 weeks ago, which he notice a development of wound at the time. Right foot pain developed a week later. It was mild initially, but progressive has gotten worsen. Patient has been non-compliant with medications for the past 2 months, because he has been feeling great. ON admission, he found to have No fever but tachycardia. He has started on Unasyn and Vancomycin, and the ID consult requested to assist with further evaluation and antibiotic management.    # Right DFU    would recommend:    1. Podiatry evaluation for deep culture since wound probe to bone  2. Hold off ABx  until deep culture obtained , since not septic now  3. ESR and  CRP  4. MRI of Right foot to rule out OM      Attending Attestation:    Spent more than 45 minutes on total encounter, more than 50 % of the visit was spent counseling and/or coordinating care by the Attending physician.   Patient is a 76y old  Male who ambulates and performs ADL independently, w/ PMH of CAD (s/p CABG >10yrs ago), DM, HTN, and HLD, now presents to the ER for evaluation of right foot pain x2 weeks.  He states stepping on a dog treat approximately 3 weeks ago, which he notice a development of wound at the time. Right foot pain developed a week later. It was mild initially, but progressive has gotten worsen. Patient has been non-compliant with medications for the past 2 months, because he has been feeling great. ON admission, he found to have No fever but tachycardia. He has started on Unasyn and Vancomycin, and the ID consult requested to assist with further evaluation and antibiotic management.    # Right DFU - wound Cx grew Streptococcus agalactiae (Group B)    would recommend:    1. Follow up MRI of Right foot to rule out OM Streptococcus agalactiae (Group B)  2. Start Unasyn since wound culture grew   3. Monitor kidney function  4. Wound care as per Podiatry       Attending Attestation:    Spent more than 45 minutes on total encounter, more than 50 % of the visit was spent counseling and/or coordinating care by the Attending physician.

## 2021-08-15 NOTE — PROGRESS NOTE ADULT - SUBJECTIVE AND OBJECTIVE BOX
Patient is seen and examined at the bed side, is afebrile.      REVIEW OF SYSTEMS: All other review systems are negative        ALLERGIES: No Known Allergies      Vital Signs Last 24 Hrs  T(C): 36.4 (15 Aug 2021 14:51), Max: 37.3 (15 Aug 2021 05:06)  T(F): 97.6 (15 Aug 2021 14:51), Max: 99.2 (15 Aug 2021 05:06)  HR: 73 (15 Aug 2021 14:51) (73 - 102)  BP: 167/96 (15 Aug 2021 14:51) (151/79 - 175/85)  BP(mean): --  RR: 18 (15 Aug 2021 14:51) (17 - 18)  SpO2: 95% (15 Aug 2021 14:51) (95% - 99%)      PHYSICAL EXAM:  GENERAL: Not in distress   CHEST/LUNG:  Aire ntry bilaterally  HEART: s1 and s2 present  ABDOMEN:  Nontender and  Nondistended  EXTREMITIES: Right foot bandage in placed  CNS: Awake and Alert      LABS:                          12.2   7.02  )-----------( 215      ( 15 Aug 2021 06:18 )             36.5                           12.7   8.51  )-----------( 206      ( 14 Aug 2021 09:43 )             37.7       08-15    137  |  104  |  25<H>  ----------------------------<  245<H>  4.0   |  24  |  1.62<H>    Ca    8.6      15 Aug 2021 06:18  Phos  3.2     08-15  Mg     2.3     08-15    TPro  7.1  /  Alb  3.4<L>  /  TBili  0.6  /  DBili  x   /  AST  11  /  ALT  16  /  AlkPhos  145<H>  08-15    08-14    135  |  104  |  22<H>  ----------------------------<  311<H>  4.5   |  23  |  1.59<H>    Ca    8.8      14 Aug 2021 09:43        CAPILLARY BLOOD GLUCOSE  POCT Blood Glucose.: 305 mg/dL (14 Aug 2021 16:20)  POCT Blood Glucose.: 281 mg/dL (14 Aug 2021 07:29)        MEDICATIONS  (STANDING):    aspirin  chewable 81 milliGRAM(s) Oral daily  atorvastatin 40 milliGRAM(s) Oral at bedtime  dextrose 40% Gel 15 Gram(s) Oral once  dextrose 5%. 1000 milliLiter(s) (50 mL/Hr) IV Continuous <Continuous>  dextrose 5%. 1000 milliLiter(s) (100 mL/Hr) IV Continuous <Continuous>  dextrose 50% Injectable 25 Gram(s) IV Push once  dextrose 50% Injectable 12.5 Gram(s) IV Push once  dextrose 50% Injectable 25 Gram(s) IV Push once  glucagon  Injectable 1 milliGRAM(s) IntraMuscular once  heparin   Injectable 5000 Unit(s) SubCutaneous every 8 hours  insulin glargine Injectable (LANTUS) 7 Unit(s) SubCutaneous at bedtime  insulin lispro (ADMELOG) corrective regimen sliding scale   SubCutaneous Before meals and at bedtime  metoprolol succinate ER 50 milliGRAM(s) Oral daily  sodium chloride 0.9%. 1000 milliLiter(s) (75 mL/Hr) IV Continuous <Continuous>        RADIOLOGY & ADDITIONAL TESTS:  < from: Xray Foot AP + Lateral + Oblique, Right (08.14.21 @ 09:51) >  IMPRESSION: No fracture dislocation or focal bone destruction. No unusual periosteal reaction. Joint spaces preserved. Calcaneal enthesopathy. Extensive calcification/ossification in the region of the plantar fascia . Small vessel calcification. Diffuse soft tissue swelling may reflect cellulitis. No soft tissue gas.        MICROBIOLOGY DATA:    COVID-19 David Domain Antibody (08.15.21 @ 11:30)   COVID-19 David Domain Antibody Result: >250.00    Culture - Surgical Swab (08.14.21 @ 21:08)   Specimen Source: .Surgical Swab Right foot plantar wound   Culture Results:   Moderate Streptococcus agalactiae (Group B) isolated   Group B streptococci are susceptible to ampicillin,   penicillin and cefazolin, but may be resistant to   erythromycin and clindamycin.   Recommendations for intrapartum prophylaxis for Group B   streptococci are penicillin or ampicillin.   Normal skin filiberto isolated     COVID-19 PCR . (08.14.21 @ 09:41)   COVID-19 PCR: Siomarateed           Patient is seen and examined at the bed side, is afebrile. The Blood cultures have no growth to date and wound culture grew Streptococcus agalactiae (Group B).       REVIEW OF SYSTEMS: All other review systems are negative      ALLERGIES: No Known Allergies      Vital Signs Last 24 Hrs  T(C): 36.4 (15 Aug 2021 14:51), Max: 37.3 (15 Aug 2021 05:06)  T(F): 97.6 (15 Aug 2021 14:51), Max: 99.2 (15 Aug 2021 05:06)  HR: 73 (15 Aug 2021 14:51) (73 - 102)  BP: 167/96 (15 Aug 2021 14:51) (151/79 - 175/85)  BP(mean): --  RR: 18 (15 Aug 2021 14:51) (17 - 18)  SpO2: 95% (15 Aug 2021 14:51) (95% - 99%)      PHYSICAL EXAM:  GENERAL: Not in distress   CHEST/LUNG:  Aire ntry bilaterally  HEART: s1 and s2 present  ABDOMEN:  Nontender and  Nondistended  EXTREMITIES: Right foot bandage in placed  CNS: Awake and Alert      LABS:                          12.2   7.02  )-----------( 215      ( 15 Aug 2021 06:18 )             36.5                           12.7   8.51  )-----------( 206      ( 14 Aug 2021 09:43 )             37.7       08-15    137  |  104  |  25<H>  ----------------------------<  245<H>  4.0   |  24  |  1.62<H>    Ca    8.6      15 Aug 2021 06:18  Phos  3.2     08-15  Mg     2.3     08-15    TPro  7.1  /  Alb  3.4<L>  /  TBili  0.6  /  DBili  x   /  AST  11  /  ALT  16  /  AlkPhos  145<H>  08-15    08-14    135  |  104  |  22<H>  ----------------------------<  311<H>  4.5   |  23  |  1.59<H>    Ca    8.8      14 Aug 2021 09:43        CAPILLARY BLOOD GLUCOSE  POCT Blood Glucose.: 305 mg/dL (14 Aug 2021 16:20)  POCT Blood Glucose.: 281 mg/dL (14 Aug 2021 07:29)        MEDICATIONS  (STANDING):    aspirin  chewable 81 milliGRAM(s) Oral daily  atorvastatin 40 milliGRAM(s) Oral at bedtime  dextrose 40% Gel 15 Gram(s) Oral once  dextrose 5%. 1000 milliLiter(s) (50 mL/Hr) IV Continuous <Continuous>  dextrose 5%. 1000 milliLiter(s) (100 mL/Hr) IV Continuous <Continuous>  dextrose 50% Injectable 25 Gram(s) IV Push once  dextrose 50% Injectable 12.5 Gram(s) IV Push once  dextrose 50% Injectable 25 Gram(s) IV Push once  glucagon  Injectable 1 milliGRAM(s) IntraMuscular once  heparin   Injectable 5000 Unit(s) SubCutaneous every 8 hours  insulin glargine Injectable (LANTUS) 7 Unit(s) SubCutaneous at bedtime  insulin lispro (ADMELOG) corrective regimen sliding scale   SubCutaneous Before meals and at bedtime  metoprolol succinate ER 50 milliGRAM(s) Oral daily  sodium chloride 0.9%. 1000 milliLiter(s) (75 mL/Hr) IV Continuous <Continuous>        RADIOLOGY & ADDITIONAL TESTS:  < from: Xray Foot AP + Lateral + Oblique, Right (08.14.21 @ 09:51) >  IMPRESSION: No fracture dislocation or focal bone destruction. No unusual periosteal reaction. Joint spaces preserved. Calcaneal enthesopathy. Extensive calcification/ossification in the region of the plantar fascia . Small vessel calcification. Diffuse soft tissue swelling may reflect cellulitis. No soft tissue gas.        MICROBIOLOGY DATA:    Culture - Blood (08.14.21 @ 21:09)   Specimen Source: .Blood Blood-Venous   Culture Results: No growth to date.     Culture - Blood (08.14.21 @ 21:09)   Specimen Source: .Blood Blood-Venous   Culture Results: No growth to date.     COVID-19 David Domain Antibody (08.15.21 @ 11:30)   COVID-19 David Domain Antibody Result: >250.00    Culture - Surgical Swab (08.14.21 @ 21:08)   Specimen Source: .Surgical Swab Right foot plantar wound   Culture Results:   Moderate Streptococcus agalactiae (Group B) isolated   Group B streptococci are susceptible to ampicillin,   penicillin and cefazolin, but may be resistant to   erythromycin and clindamycin.   Recommendations for intrapartum prophylaxis for Group B   streptococci are penicillin or ampicillin.   Normal skin filiberto isolated     COVID-19 PCR . (08.14.21 @ 09:41)   COVID-19 PCR: NotDetec

## 2021-08-15 NOTE — CONSULT NOTE ADULT - SUBJECTIVE AND OBJECTIVE BOX
HISTORY OF PRESENT ILLNESS: Patient is a 76yoM, ambulates and performs ADL independently, w/ PMH of CAD (s/p CABG >10yrs ago), DM, HTN, and HLD, presents with right foot pain x2 weeks. He reports 10/10, progressive, intermittent, sharp, non-radiating, right foot pain. He states stepping on a dog treat approximately 3 weeks ago, which he notice a development of wound at the time. Right foot pain developed a week later. It was mild initially, but progressive has gotten worsen. Patient has been non-compliant with medications for the past 2 months, because he has been feeling great. He denies fever, chills, n/v, dizziness, chest pain, sob, abdominal pain, urinary or bowel movement changes.     PAST MEDICAL & SURGICAL HISTORY:  HTN (hypertension)    HLD (hyperlipidemia)    DM (diabetes mellitus)    CAD (coronary artery disease)    Glaucoma, angle-closure    S/P CABG (coronary artery bypass graft)    History of thyroid surgery        [ ] Diabetes   [ ] Hypertension  [ ] Hyperlipidemia  [ ] CAD  [ ] PCI  [ ] CABG    PREVIOUS DIAGNOSTIC TESTING:    [ ] Echocardiogram:  [ ]  Catheterization:  [ ] Stress Test:  	    MEDICATIONS:  aspirin  chewable 81 milliGRAM(s) Oral daily  heparin   Injectable 5000 Unit(s) SubCutaneous every 8 hours  metoprolol succinate ER 50 milliGRAM(s) Oral daily        HYDROmorphone  Injectable 0.5 milliGRAM(s) IV Push every 3 hours PRN  HYDROmorphone  Injectable 1 milliGRAM(s) IV Push every 3 hours PRN      atorvastatin 40 milliGRAM(s) Oral at bedtime  dextrose 40% Gel 15 Gram(s) Oral once  dextrose 50% Injectable 25 Gram(s) IV Push once  dextrose 50% Injectable 12.5 Gram(s) IV Push once  dextrose 50% Injectable 25 Gram(s) IV Push once  glucagon  Injectable 1 milliGRAM(s) IntraMuscular once  insulin lispro (ADMELOG) corrective regimen sliding scale   SubCutaneous Before meals and at bedtime    dextrose 5%. 1000 milliLiter(s) IV Continuous <Continuous>  dextrose 5%. 1000 milliLiter(s) IV Continuous <Continuous>      Allergies    No Known Allergies    Intolerances        FAMILY HISTORY:  Family history of acute myocardial infarction (Father)    Family history of diabetes mellitus (Mother, Sibling)    Family history of hypertension (Mother, Sibling)    Family history of hyperlipidemia (Mother, Sibling)        SOCIAL HISTORY:    [ ] Non-smoker  [ ] Smoker  [ ] Alcohol      REVIEW OF SYSTEMS:  [ ]chest pain  [  ]shortness of breath  [  ]palpitations  [  ]syncope  [ ]near syncope [  ]diplopia  [  ]altered mental status   [  ]fevers  [ ]chills [ ]nausea  [ ]vomitting  [ ]abdominal pain  [ ]melena  [ ]BRBPR  [  ]epistaxis  [  ]rash  [  ]lower extremity edema      CONSTITUTIONAL: No fever, weight loss, or fatigue  EYES: No eye pain, visual disturbances, or discharge  ENMT:  No difficulty hearing, tinnitus, vertigo; No sinus or throat pain  NECK: No pain or stiffness  RESPIRATORY: No cough, wheezing, chills or hemoptysis; No Shortness of Breath  CARDIOVASCULAR: No chest pain, palpitations, passing out, dizziness, or leg swelling  GASTROINTESTINAL: No abdominal or epigastric pain. No nausea, vomiting, or hematemesis; No diarrhea or constipation. No melena or hematochezia.  GENITOURINARY: No dysuria, frequency, hematuria, or incontinence  NEUROLOGICAL: No headaches, memory loss, loss of strength, numbness, or tremors  SKIN: No itching, burning, rashes, or lesions   LYMPH Nodes: No enlarged glands  ENDOCRINE: No heat or cold intolerance; No hair loss  MUSCULOSKELETAL: No joint pain or swelling; No muscle, back, or extremity pain  PSYCHIATRIC: No depression, anxiety, mood swings, or difficulty sleeping  HEME/LYMPH: No easy bruising, or bleeding gums  ALLERY AND IMMUNOLOGIC: No hives or eczema	    [ ] All others negative	  [ ] Unable to obtain    PHYSICAL EXAM:  T(C): 37.3 (08-15-21 @ 05:06), Max: 37.3 (08-15-21 @ 05:06)  HR: 87 (08-15-21 @ 05:06) (87 - 103)  BP: 175/85 (08-15-21 @ 05:06) (151/79 - 184/94)  RR: 18 (08-15-21 @ 05:06) (17 - 18)  SpO2: 99% (08-15-21 @ 05:06) (97% - 100%)  Wt(kg): --  I&O's Summary      Appearance: Normal	  HEENT:   Normal oral mucosa, PERRL, EOMI	  Lymphatic: No lymphadenopathy  Cardiovascular: Normal S1 S2, No JVD, No murmurs, No edema  Respiratory: Lungs clear to auscultation	  Psychiatry: A & O x 3, Mood & affect appropriate  Gastrointestinal:  Soft, Non-tender, + BS	  Skin: No rashes, No ecchymoses, No cyanosis	  Neurologic: Non-focal  Extremities: Normal range of motion, No clubbing, cyanosis or edema  Vascular: Peripheral pulses palpable 2+ bilaterally    TELEMETRY: 	  nsr   ECG:  	NSR 88 , NAD  no acute ischemia   RADIOLOGY:   OTHER: 	  	  LABS:	 	    CARDIAC MARKERS:  Troponin I, Serum: <0.015 ng/mL (08-14 @ 09:43)                                  12.2   7.02  )-----------( 215      ( 15 Aug 2021 06:18 )             36.5     08-15    137  |  104  |  25<H>  ----------------------------<  245<H>  4.0   |  24  |  1.62<H>    Ca    8.6      15 Aug 2021 06:18  Phos  3.2     08-15  Mg     2.3     08-15    TPro  7.1  /  Alb  3.4<L>  /  TBili  0.6  /  DBili  x   /  AST  11  /  ALT  16  /  AlkPhos  145<H>  08-15    proBNP:   Lipid Profile:   HgA1c:   TSH: Thyroid Stimulating Hormone, Serum: 2.23 uU/mL (08-15 @ 06:18)      ASSESSMENT/PLAN:   76yoM, ambulates and performs ADL independently, w/ PMH of CAD (s/p CABG >10yrs ago), DM, HTN, and HLD, presents with right foot pain x2 weeks. Cardiology consulted for elevated troponin .     ASA, statin ,   Tele stable   cont BB, would increase for BP control   Cardiac markers noted, CK neg ,   ECHO pending  No s/s of chf on exam   POD following for foot pain   D/W Dr Alford      HISTORY OF PRESENT ILLNESS: Patient is a 76yoM, ambulates and performs ADL independently, w/ PMH of CAD (s/p CABG >10yrs ago), DM, HTN, and HLD, presents with right foot pain x2 weeks. He reports 10/10, progressive, intermittent, sharp, non-radiating, right foot pain. He states stepping on a dog treat approximately 3 weeks ago, which he notice a development of wound at the time. Right foot pain developed a week later. It was mild initially, but progressive has gotten worsen. Patient has been non-compliant with medications for the past 2 months, because he has been feeling great. He denies fever, chills, n/v, dizziness, chest pain, sob, abdominal pain, urinary or bowel movement changes.     PAST MEDICAL & SURGICAL HISTORY:  HTN (hypertension)    HLD (hyperlipidemia)    DM (diabetes mellitus)    CAD (coronary artery disease)    Glaucoma, angle-closure    S/P CABG (coronary artery bypass graft)    History of thyroid surgery        [ ] Diabetes   [ ] Hypertension  [ ] Hyperlipidemia  [ ] CAD  [ ] PCI  [ ] CABG    PREVIOUS DIAGNOSTIC TESTING:    [ ] Echocardiogram:  [ ]  Catheterization:  [ ] Stress Test:  	    MEDICATIONS:  aspirin  chewable 81 milliGRAM(s) Oral daily  heparin   Injectable 5000 Unit(s) SubCutaneous every 8 hours  metoprolol succinate ER 50 milliGRAM(s) Oral daily        HYDROmorphone  Injectable 0.5 milliGRAM(s) IV Push every 3 hours PRN  HYDROmorphone  Injectable 1 milliGRAM(s) IV Push every 3 hours PRN      atorvastatin 40 milliGRAM(s) Oral at bedtime  dextrose 40% Gel 15 Gram(s) Oral once  dextrose 50% Injectable 25 Gram(s) IV Push once  dextrose 50% Injectable 12.5 Gram(s) IV Push once  dextrose 50% Injectable 25 Gram(s) IV Push once  glucagon  Injectable 1 milliGRAM(s) IntraMuscular once  insulin lispro (ADMELOG) corrective regimen sliding scale   SubCutaneous Before meals and at bedtime    dextrose 5%. 1000 milliLiter(s) IV Continuous <Continuous>  dextrose 5%. 1000 milliLiter(s) IV Continuous <Continuous>      Allergies    No Known Allergies    Intolerances        FAMILY HISTORY:  Family history of acute myocardial infarction (Father)    Family history of diabetes mellitus (Mother, Sibling)    Family history of hypertension (Mother, Sibling)    Family history of hyperlipidemia (Mother, Sibling)        SOCIAL HISTORY:    [ ] Non-smoker  [ ] Smoker  [ ] Alcohol      REVIEW OF SYSTEMS:  [ ]chest pain  [  ]shortness of breath  [  ]palpitations  [  ]syncope  [ ]near syncope [  ]diplopia  [  ]altered mental status   [  ]fevers  [ ]chills [ ]nausea  [ ]vomitting  [ ]abdominal pain  [ ]melena  [ ]BRBPR  [  ]epistaxis  [  ]rash  [  ]lower extremity edema      CONSTITUTIONAL: No fever, weight loss, or fatigue  EYES: No eye pain, visual disturbances, or discharge  ENMT:  No difficulty hearing, tinnitus, vertigo; No sinus or throat pain  NECK: No pain or stiffness  RESPIRATORY: No cough, wheezing, chills or hemoptysis; No Shortness of Breath  CARDIOVASCULAR: No chest pain, palpitations, passing out, dizziness, or leg swelling  GASTROINTESTINAL: No abdominal or epigastric pain. No nausea, vomiting, or hematemesis; No diarrhea or constipation. No melena or hematochezia.  GENITOURINARY: No dysuria, frequency, hematuria, or incontinence  NEUROLOGICAL: No headaches, memory loss, loss of strength, numbness, or tremors  SKIN: No itching, burning, rashes, or lesions   LYMPH Nodes: No enlarged glands  ENDOCRINE: No heat or cold intolerance; No hair loss  MUSCULOSKELETAL: No joint pain or swelling; No muscle, back, or extremity pain  PSYCHIATRIC: No depression, anxiety, mood swings, or difficulty sleeping  HEME/LYMPH: No easy bruising, or bleeding gums  ALLERY AND IMMUNOLOGIC: No hives or eczema	    [ ] All others negative	  [ ] Unable to obtain    PHYSICAL EXAM:  T(C): 37.3 (08-15-21 @ 05:06), Max: 37.3 (08-15-21 @ 05:06)  HR: 87 (08-15-21 @ 05:06) (87 - 103)  BP: 175/85 (08-15-21 @ 05:06) (151/79 - 184/94)  RR: 18 (08-15-21 @ 05:06) (17 - 18)  SpO2: 99% (08-15-21 @ 05:06) (97% - 100%)  Wt(kg): --  I&O's Summary      Appearance: Normal	  HEENT:   Normal oral mucosa, PERRL, EOMI	  Lymphatic: No lymphadenopathy  Cardiovascular: Normal S1 S2, No JVD, No murmurs, No edema  Respiratory: Lungs clear to auscultation	  Psychiatry: A & O x 3, Mood & affect appropriate  Gastrointestinal:  Soft, Non-tender, + BS	  Skin: No rashes, No ecchymoses, No cyanosis	  Neurologic: Non-focal  Extremities: Normal range of motion, No clubbing, cyanosis or edema  Vascular: Peripheral pulses palpable 2+ bilaterally    TELEMETRY: 	  nsr   ECG:  	NSR 88 , NAD  no acute ischemia   RADIOLOGY:   OTHER: 	  	  LABS:	 	    CARDIAC MARKERS:  Troponin I, Serum: <0.015 ng/mL (08-14 @ 09:43)                                  12.2   7.02  )-----------( 215      ( 15 Aug 2021 06:18 )             36.5     08-15    137  |  104  |  25<H>  ----------------------------<  245<H>  4.0   |  24  |  1.62<H>    Ca    8.6      15 Aug 2021 06:18  Phos  3.2     08-15  Mg     2.3     08-15    TPro  7.1  /  Alb  3.4<L>  /  TBili  0.6  /  DBili  x   /  AST  11  /  ALT  16  /  AlkPhos  145<H>  08-15    proBNP:   Lipid Profile:   HgA1c:   TSH: Thyroid Stimulating Hormone, Serum: 2.23 uU/mL (08-15 @ 06:18)      ASSESSMENT/PLAN:   76yoM, ambulates and performs ADL independently, w/ PMH of CAD (s/p CABG >10yrs ago), DM, HTN, and HLD, presents with right foot pain x2 weeks. Cardiology consulted for management of cad.     ASA, statin ,   Tele stable   cont BB, would increase for BP control   Cardiac markers noted, CK neg ,   ECHO pending  No s/s of chf on exam   POD following for foot pain   D/W Dr Alford

## 2021-08-15 NOTE — CONSULT NOTE ADULT - ATTENDING COMMENTS
Patient seen and examined.  Agree with above.   Admitted with right foot wound being seen for management of CAD  Pt. with no chest pain or anginal symptoms.  ECG with lateral st depressions unchanged from baseline  Recommend medical management of known CAD  On exam, pt. with systolic murmur - check TTE to further evaluate  Check SIMON/PVR   Follow up podiatry     Shantel Alford MD

## 2021-08-15 NOTE — PROGRESS NOTE ADULT - SUBJECTIVE AND OBJECTIVE BOX
Patient is a 76y old  Male who presents with a chief complaint of diabetic ulcer (14 Aug 2021 13:11)    Podiatry Interval HPI: Patient was seen bedside in no acute distress. Patient states that he still cannot ambulate due to the pain. Patient states that he liked the dressing and how it was done. Patient denies any new overnight events. Denies any constitutional symptoms of N/V/C/F/Sob. Denies any new pedal complaints.     Podiatry HPI: Podiatry consulted regarding 77 yo male patient who presents to ED with a chief complaint of right foot plantar ulcer. Patient states that he noticed the ulcer about 3 weeks ago. Denies any trauma or injury. He recalls stepping on a dog treat and isn't sure if that caused an opening on the bottom of his right foot. Patient states that he has been diagnosed with diabetes for a long time. Patient reports that the ulcer became painful and can hardly ambulate due to pain. Pt rates the pain 10/10 on the VAS. Denies other pedal complaints. Denies constitutional symptoms of N/V/C/F/Sob.     HPI:  Patient is a 76yoM, ambulates and performs ADL independently, w/ PMH of CAD (s/p CABG >10yrs ago), DM, HTN, and HLD, presents with right foot pain x2 weeks. He reports 10/10, progressive, intermittent, sharp, non-radiating, right foot pain. He states stepping on a dog treat approximately 3 weeks ago, which he notice a development of wound at the time. Right foot pain developed a week later. It was mild initially, but progressive has gotten worsen. Patient has been non-compliant with medications for the past 2 months, because he has been feeling great. He denies fever, chills, n/v, dizziness, chest pain, sob, abdominal pain, urinary or bowel movement changes.  (14 Aug 2021 13:11)      PMH: HTN (hypertension)    HLD (hyperlipidemia)    DM (diabetes mellitus)    CAD (coronary artery disease)    Glaucoma, angle-closure    Allergies: No Known Allergies      Medications aspirin  chewable 81 milliGRAM(s) Oral daily  atorvastatin 40 milliGRAM(s) Oral at bedtime  dextrose 40% Gel 15 Gram(s) Oral once  dextrose 5%. 1000 milliLiter(s) IV Continuous <Continuous>  dextrose 5%. 1000 milliLiter(s) IV Continuous <Continuous>  dextrose 50% Injectable 25 Gram(s) IV Push once  dextrose 50% Injectable 12.5 Gram(s) IV Push once  dextrose 50% Injectable 25 Gram(s) IV Push once  glucagon  Injectable 1 milliGRAM(s) IntraMuscular once  heparin   Injectable 5000 Unit(s) SubCutaneous every 8 hours  HYDROmorphone  Injectable 0.5 milliGRAM(s) IV Push every 3 hours PRN  HYDROmorphone  Injectable 1 milliGRAM(s) IV Push every 3 hours PRN  insulin lispro (ADMELOG) corrective regimen sliding scale   SubCutaneous Before meals and at bedtime  metoprolol succinate ER 50 milliGRAM(s) Oral daily    FHFamily history of acute myocardial infarction (Father)    Family history of diabetes mellitus (Mother, Sibling)    Family history of hypertension (Mother, Sibling)    Family history of hyperlipidemia (Mother, Sibling)    ,   PMHHTN (hypertension)    HLD (hyperlipidemia)    DM (diabetes mellitus)    CAD (coronary artery disease)    Glaucoma, angle-closure       PSHNo significant past surgical history    S/P CABG (coronary artery bypass graft)    History of thyroid surgery        Labs                          12.2   7.02  )-----------( 215      ( 15 Aug 2021 06:18 )             36.5      08-15    137  |  104  |  25<H>  ----------------------------<  245<H>  4.0   |  24  |  1.62<H>    Ca    8.6      15 Aug 2021 06:18  Phos  3.2     08-15  Mg     2.3     08-15    TPro  7.1  /  Alb  3.4<L>  /  TBili  0.6  /  DBili  x   /  AST  11  /  ALT  16  /  AlkPhos  145<H>  08-15     Vital Signs Last 24 Hrs  T(C): 37.3 (15 Aug 2021 05:06), Max: 37.3 (15 Aug 2021 05:06)  T(F): 99.2 (15 Aug 2021 05:06), Max: 99.2 (15 Aug 2021 05:06)  HR: 87 (15 Aug 2021 05:06) (87 - 103)  BP: 175/85 (15 Aug 2021 05:06) (151/79 - 184/94)  BP(mean): --  RR: 18 (15 Aug 2021 05:06) (17 - 18)  SpO2: 99% (15 Aug 2021 05:06) (97% - 100%)  Sedimentation Rate, Erythrocyte: 44 mm/Hr (08-15-21 @ 06:18)         C-Reactive Protein, Serum: 67 mg/L (08-15-21 @ 11:55)   WBC Count: 7.02 K/uL (08-15-21 @ 06:18)      ROS  REVIEW OF SYSTEMS: All others negative unless stated otherwise in the HPI         PHYSICAL EXAM  GEN: SHER ROBERT is a pleasant well-nourished, well developed 76y Male in no acute distress, alert awake, and oriented to person, place and time.   LE Focused:    Vasc: DP/PT 2/4 on the left, 1/4 on the right, CFT brisk to all digits, TG warm to warm on the LLE, localized edema and erythema on the lateral dorsal aspect of the right foot  Derm: Ulceration noted on submet 5 of right foot with macerated periwound and fibrotic base, measuring 0.9 x 0.5 cm. + PTB, no malodor, no tunneling, no purulent drainage upon expression  Neuro: Protective sensation and epicritic sensation grossly diminished b/l  MSK: Pain localized to the lateral aspect of the right forefoot, able to move extremities in all compartments         Imaging:   x ray of the left foot   EXAM:  XR FOOT COMP MIN 3 VIEWS RT                          PROCEDURE DATE:  08/14/2021      INTERPRETATION:  Diabetic foot ulcer cellulitis.    3 views right foot.    IMPRESSION: No fracture dislocation or focal bone destruction. No unusual periosteal reaction. Joint spaces preserved. Calcaneal enthesopathy. Extensive calcification/ossification in the region of the plantar fascia . Small vessel calcification. Diffuse soft tissue swelling may reflect cellulitis. No soft tissue gas.          Microbiology  Culture of the right plantar wound  pending

## 2021-08-15 NOTE — PROGRESS NOTE ADULT - SUBJECTIVE AND OBJECTIVE BOX
Patient is a 76y old  Male who presents with a chief complaint of diabetic ulcer (15 Aug 2021 09:17)    PATIENT IS SEEN AND EXAMINED IN MEDICAL FLOOR.  JOCELYN [    ]    MADDY [   ]      GT [   ]    ALLERGIES:  No Known Allergies      Daily Height in cm: 172.72 (15 Aug 2021 01:50)    Daily     VITALS:    Vital Signs Last 24 Hrs  T(C): 37.3 (15 Aug 2021 05:06), Max: 37.3 (15 Aug 2021 05:06)  T(F): 99.2 (15 Aug 2021 05:06), Max: 99.2 (15 Aug 2021 05:06)  HR: 87 (15 Aug 2021 05:06) (87 - 103)  BP: 175/85 (15 Aug 2021 05:06) (151/79 - 184/94)  BP(mean): --  RR: 18 (15 Aug 2021 05:06) (17 - 18)  SpO2: 99% (15 Aug 2021 05:06) (97% - 100%)    LABS:    CBC Full  -  ( 15 Aug 2021 06:18 )  WBC Count : 7.02 K/uL  RBC Count : 4.16 M/uL  Hemoglobin : 12.2 g/dL  Hematocrit : 36.5 %  Platelet Count - Automated : 215 K/uL  Mean Cell Volume : 87.7 fl  Mean Cell Hemoglobin : 29.3 pg  Mean Cell Hemoglobin Concentration : 33.4 gm/dL  Auto Neutrophil # : 5.08 K/uL  Auto Lymphocyte # : 1.34 K/uL  Auto Monocyte # : 0.42 K/uL  Auto Eosinophil # : 0.11 K/uL  Auto Basophil # : 0.04 K/uL  Auto Neutrophil % : 72.3 %  Auto Lymphocyte % : 19.1 %  Auto Monocyte % : 6.0 %  Auto Eosinophil % : 1.6 %  Auto Basophil % : 0.6 %      08-15    137  |  104  |  25<H>  ----------------------------<  245<H>  4.0   |  24  |  1.62<H>    Ca    8.6      15 Aug 2021 06:18  Phos  3.2     08-15  Mg     2.3     08-15    TPro  7.1  /  Alb  3.4<L>  /  TBili  0.6  /  DBili  x   /  AST  11  /  ALT  16  /  AlkPhos  145<H>  08-15    CAPILLARY BLOOD GLUCOSE      POCT Blood Glucose.: 170 mg/dL (15 Aug 2021 11:26)  POCT Blood Glucose.: 275 mg/dL (15 Aug 2021 07:52)  POCT Blood Glucose.: 250 mg/dL (14 Aug 2021 21:55)  POCT Blood Glucose.: 305 mg/dL (14 Aug 2021 16:20)    CARDIAC MARKERS ( 14 Aug 2021 09:43 )  <0.015 ng/mL / x     / 88 U/L / x     / x          LIVER FUNCTIONS - ( 15 Aug 2021 06:18 )  Alb: 3.4 g/dL / Pro: 7.1 g/dL / ALK PHOS: 145 U/L / ALT: 16 U/L DA / AST: 11 U/L / GGT: x           Creatinine Trend: 1.62<--, 1.59<--  I&O's Summary              MEDICATIONS:    MEDICATIONS  (STANDING):  aspirin  chewable 81 milliGRAM(s) Oral daily  atorvastatin 40 milliGRAM(s) Oral at bedtime  dextrose 40% Gel 15 Gram(s) Oral once  dextrose 5%. 1000 milliLiter(s) (50 mL/Hr) IV Continuous <Continuous>  dextrose 5%. 1000 milliLiter(s) (100 mL/Hr) IV Continuous <Continuous>  dextrose 50% Injectable 25 Gram(s) IV Push once  dextrose 50% Injectable 12.5 Gram(s) IV Push once  dextrose 50% Injectable 25 Gram(s) IV Push once  glucagon  Injectable 1 milliGRAM(s) IntraMuscular once  heparin   Injectable 5000 Unit(s) SubCutaneous every 8 hours  insulin lispro (ADMELOG) corrective regimen sliding scale   SubCutaneous Before meals and at bedtime  metoprolol succinate ER 50 milliGRAM(s) Oral daily      MEDICATIONS  (PRN):  HYDROmorphone  Injectable 0.5 milliGRAM(s) IV Push every 3 hours PRN Moderate Pain (4 - 6)  HYDROmorphone  Injectable 1 milliGRAM(s) IV Push every 3 hours PRN Severe Pain (7 - 10)      REVIEW OF SYSTEMS:                           ALL ROS DONE [ X   ]    CONSTITUTIONAL:  LETHARGIC [   ], FEVER [   ], UNRESPONSIVE [   ]  CVS:  CP  [   ], SOB, [   ], PALPITATIONS [   ], DIZZYNESS [   ]  RS: COUGH [   ], SPUTUM [   ]  GI: ABDOMINAL PAIN [   ], NAUSEA [   ], VOMITINGS [   ], DIARRHEA [   ], CONSTIPATION [   ]  :  DYSURIA [   ], NOCTURIA [   ], INCREASED FREQUENCY [   ], DRIBLING [   ],  SKELETAL: PAINFUL JOINTS [   ], SWOLLEN JOINTS [   ], NECK ACHE [   ], LOW BACK ACHE [   ],  SKIN : ULCERS [   ], RASH [   ], ITCHING [   ]  CNS: HEAD ACHE [   ], DOUBLE VISION [   ], BLURRED VISION [   ], AMS / CONFUSION [   ], SEIZURES [   ], WEAKNESS [   ],TINGLING / NUMBNESS [   ]    PHYSICAL EXAMINATION:  GENERAL APPEARANCE: NO DISTRESS  HEENT:  NO PALLOR, NO  JVD,  NO   NODES, NECK SUPPLE  CVS: S1 +, S2 +,   RS: AEEB,  OCCASIONAL  RALES +,   NO RONCHI  ABD: SOFT, NT, NO, BS +  EXT: NO PE  SKIN: WARM,   RIGHT FOOT IN BANDAGES  SKELETAL:  ROM ACCEPTABLE  CNS:  AAO X 3,   DEFICITS    RADIOLOGY :      EXAM:  XR FOOT COMP MIN 3 VIEWS RT                            PROCEDURE DATE:  08/14/2021          INTERPRETATION:  Diabetic foot ulcer cellulitis.    3 views right foot.    IMPRESSION: No fracture dislocation or focal bone destruction. No unusual periosteal reaction. Joint spaces preserved. Calcaneal enthesopathy. Extensive calcification/ossification in the region of the plantar fascia . Small vessel calcification. Diffuse soft tissue swelling may reflect cellulitis. No soft tissue gas.      ASSESSMENT :     Type 2 diabetes mellitus with foot ulcer    HTN (hypertension)    HLD (hyperlipidemia)    DM (diabetes mellitus)    CAD (coronary artery disease)    Glaucoma, angle-closure    No significant past surgical history    S/P CABG (coronary artery bypass graft)    History of thyroid surgery        PLAN:  HPI:  Patient is a 76yoM, ambulates and performs ADL independently, w/ PMH of CAD (s/p CABG >10yrs ago), DM, HTN, and HLD, presents with right foot pain x2 weeks. He reports 10/10, progressive, intermittent, sharp, non-radiating, right foot pain. He states stepping on a dog treat approximately 3 weeks ago, which he notice a development of wound at the time. Right foot pain developed a week later. It was mild initially, but progressive has gotten worsen. Patient has been non-compliant with medications for the past 2 months, because he has been feeling great. He denies fever, chills, n/v, dizziness, chest pain, sob, abdominal pain, urinary or bowel movement changes.  (14 Aug 2021 13:11)    # DIABETIC FOOT ULCER, CELLULITIS, SUSPECTED OSTEOMYELITIS RIGHT FOOT - NOTED XRAY, F/U MRI  - PER ID, HOLD ABX PENDING DEEP WOUND CX  - F/U WOUND CX, F/U BCX  - ID CONSULT, PODIATRY CONSULT    # ? OREN - S/P IVF, MONITOR CR, AVOID NEPHROTOXIC AGENTS    # DM - F/U HBA1C, SSI + FS    # CAD S/P CABG - PLACED ON ASA, STATIN, BB, F/U ECHOCARDIOGRAM  - CARDIOLOGY CONSULT    # HLD - STATIN, F/U LIPID PANEL    # GI AND DVT PPX    Patient is a 76y old  Male who presents with a chief complaint of diabetic ulcer (15 Aug 2021 09:17)    PATIENT IS SEEN AND EXAMINED IN MEDICAL FLOOR.  JOCELYN [    ]    MADDY [   ]      GT [   ]    ALLERGIES:  No Known Allergies      Daily Height in cm: 172.72 (15 Aug 2021 01:50)    Daily     VITALS:    Vital Signs Last 24 Hrs  T(C): 37.3 (15 Aug 2021 05:06), Max: 37.3 (15 Aug 2021 05:06)  T(F): 99.2 (15 Aug 2021 05:06), Max: 99.2 (15 Aug 2021 05:06)  HR: 87 (15 Aug 2021 05:06) (87 - 103)  BP: 175/85 (15 Aug 2021 05:06) (151/79 - 184/94)  BP(mean): --  RR: 18 (15 Aug 2021 05:06) (17 - 18)  SpO2: 99% (15 Aug 2021 05:06) (97% - 100%)    LABS:    CBC Full  -  ( 15 Aug 2021 06:18 )  WBC Count : 7.02 K/uL  RBC Count : 4.16 M/uL  Hemoglobin : 12.2 g/dL  Hematocrit : 36.5 %  Platelet Count - Automated : 215 K/uL  Mean Cell Volume : 87.7 fl  Mean Cell Hemoglobin : 29.3 pg  Mean Cell Hemoglobin Concentration : 33.4 gm/dL  Auto Neutrophil # : 5.08 K/uL  Auto Lymphocyte # : 1.34 K/uL  Auto Monocyte # : 0.42 K/uL  Auto Eosinophil # : 0.11 K/uL  Auto Basophil # : 0.04 K/uL  Auto Neutrophil % : 72.3 %  Auto Lymphocyte % : 19.1 %  Auto Monocyte % : 6.0 %  Auto Eosinophil % : 1.6 %  Auto Basophil % : 0.6 %      08-15    137  |  104  |  25<H>  ----------------------------<  245<H>  4.0   |  24  |  1.62<H>    Ca    8.6      15 Aug 2021 06:18  Phos  3.2     08-15  Mg     2.3     08-15    TPro  7.1  /  Alb  3.4<L>  /  TBili  0.6  /  DBili  x   /  AST  11  /  ALT  16  /  AlkPhos  145<H>  08-15    CAPILLARY BLOOD GLUCOSE      POCT Blood Glucose.: 170 mg/dL (15 Aug 2021 11:26)  POCT Blood Glucose.: 275 mg/dL (15 Aug 2021 07:52)  POCT Blood Glucose.: 250 mg/dL (14 Aug 2021 21:55)  POCT Blood Glucose.: 305 mg/dL (14 Aug 2021 16:20)    CARDIAC MARKERS ( 14 Aug 2021 09:43 )  <0.015 ng/mL / x     / 88 U/L / x     / x          LIVER FUNCTIONS - ( 15 Aug 2021 06:18 )  Alb: 3.4 g/dL / Pro: 7.1 g/dL / ALK PHOS: 145 U/L / ALT: 16 U/L DA / AST: 11 U/L / GGT: x           Creatinine Trend: 1.62<--, 1.59<--  I&O's Summary              MEDICATIONS:    MEDICATIONS  (STANDING):  aspirin  chewable 81 milliGRAM(s) Oral daily  atorvastatin 40 milliGRAM(s) Oral at bedtime  dextrose 40% Gel 15 Gram(s) Oral once  dextrose 5%. 1000 milliLiter(s) (50 mL/Hr) IV Continuous <Continuous>  dextrose 5%. 1000 milliLiter(s) (100 mL/Hr) IV Continuous <Continuous>  dextrose 50% Injectable 25 Gram(s) IV Push once  dextrose 50% Injectable 12.5 Gram(s) IV Push once  dextrose 50% Injectable 25 Gram(s) IV Push once  glucagon  Injectable 1 milliGRAM(s) IntraMuscular once  heparin   Injectable 5000 Unit(s) SubCutaneous every 8 hours  insulin lispro (ADMELOG) corrective regimen sliding scale   SubCutaneous Before meals and at bedtime  metoprolol succinate ER 50 milliGRAM(s) Oral daily      MEDICATIONS  (PRN):  HYDROmorphone  Injectable 0.5 milliGRAM(s) IV Push every 3 hours PRN Moderate Pain (4 - 6)  HYDROmorphone  Injectable 1 milliGRAM(s) IV Push every 3 hours PRN Severe Pain (7 - 10)      REVIEW OF SYSTEMS:                           ALL ROS DONE [ X   ]    CONSTITUTIONAL:  LETHARGIC [   ], FEVER [   ], UNRESPONSIVE [   ]  CVS:  CP  [   ], SOB, [   ], PALPITATIONS [   ], DIZZYNESS [   ]  RS: COUGH [   ], SPUTUM [   ]  GI: ABDOMINAL PAIN [   ], NAUSEA [   ], VOMITINGS [   ], DIARRHEA [   ], CONSTIPATION [   ]  :  DYSURIA [   ], NOCTURIA [   ], INCREASED FREQUENCY [   ], DRIBLING [   ],  SKELETAL: PAINFUL JOINTS [   ], SWOLLEN JOINTS [   ], NECK ACHE [   ], LOW BACK ACHE [   ],  SKIN : ULCERS [   ], RASH [   ], ITCHING [   ]  CNS: HEAD ACHE [   ], DOUBLE VISION [   ], BLURRED VISION [   ], AMS / CONFUSION [   ], SEIZURES [   ], WEAKNESS [   ],TINGLING / NUMBNESS [   ]    PHYSICAL EXAMINATION:  GENERAL APPEARANCE: NO DISTRESS  HEENT:  NO PALLOR, NO  JVD,  NO   NODES, NECK SUPPLE  CVS: S1 +, S2 +,   RS: AEEB,  OCCASIONAL  RALES +,   NO RONCHI  ABD: SOFT, NT, NO, BS +  EXT: NO PE  SKIN: WARM,   RIGHT FOOT IN BANDAGE  SKELETAL:  ROM ACCEPTABLE  CNS:  AAO X 3,   DEFICITS    RADIOLOGY :      EXAM:  XR FOOT COMP MIN 3 VIEWS RT                            PROCEDURE DATE:  08/14/2021          INTERPRETATION:  Diabetic foot ulcer cellulitis.    3 views right foot.    IMPRESSION: No fracture dislocation or focal bone destruction. No unusual periosteal reaction. Joint spaces preserved. Calcaneal enthesopathy. Extensive calcification/ossification in the region of the plantar fascia . Small vessel calcification. Diffuse soft tissue swelling may reflect cellulitis. No soft tissue gas.      ASSESSMENT :     Type 2 diabetes mellitus with foot ulcer    HTN (hypertension)    HLD (hyperlipidemia)    DM (diabetes mellitus)    CAD (coronary artery disease)    Glaucoma, angle-closure    No significant past surgical history    S/P CABG (coronary artery bypass graft)    History of thyroid surgery        PLAN:  HPI:  Patient is a 76yoM, ambulates and performs ADL independently, w/ PMH of CAD (s/p CABG >10yrs ago), DM, HTN, and HLD, presents with right foot pain x2 weeks. He reports 10/10, progressive, intermittent, sharp, non-radiating, right foot pain. He states stepping on a dog treat approximately 3 weeks ago, which he notice a development of wound at the time. Right foot pain developed a week later. It was mild initially, but progressive has gotten worsen. Patient has been non-compliant with medications for the past 2 months, because he has been feeling great. He denies fever, chills, n/v, dizziness, chest pain, sob, abdominal pain, urinary or bowel movement changes.  (14 Aug 2021 13:11)    # DIABETIC FOOT ULCER, CELLULITIS, SUSPECTED OSTEOMYELITIS RIGHT FOOT - NOTED XRAY, F/U MRI  - PER ID, HOLD ABX PENDING DEEP WOUND CX  - F/U WOUND CX [PRE-HERNÁNDEZ GBS], F/U BCX  - ID CONSULT, PODIATRY CONSULT    # ? OREN - S/P IVF, MONITOR CR, AVOID NEPHROTOXIC AGENTS  - PLACED ON IVF, F/U UA   - NEPHROLOGY CONSULT IN A.M. - DR. SOUZA    # DM -  HBA1C - 11, STARTED LANTUS, SSI + FS      # CAD S/P CABG - PLACED ON ASA, STATIN, BB, F/U ECHOCARDIOGRAM  - CARDIOLOGY CONSULT    # HLD - STATIN, F/U LIPID PANEL    # GI AND DVT PPX

## 2021-08-15 NOTE — PROGRESS NOTE ADULT - ASSESSMENT
Assessment  Diabetic foot ulcer Right  DM type 2  R/o Osteomyelitis       Plan  Patient evaluated and chart created  X ray reviewed - no soft tissue gas noted  Monitor labs  culture results pending  Dressed the right foot wound with betadine and DSD   MRI of the right foot pending - rule out osteomyelitis  Recommend antibiotics per primary team management  Podiatry will follow while in house  Discussed with attending Dr. Vazquez

## 2021-08-16 DIAGNOSIS — N17.9 ACUTE KIDNEY FAILURE, UNSPECIFIED: ICD-10-CM

## 2021-08-16 LAB
ANION GAP SERPL CALC-SCNC: 5 MMOL/L — SIGNIFICANT CHANGE UP (ref 5–17)
APPEARANCE UR: CLEAR — SIGNIFICANT CHANGE UP
BACTERIA # UR AUTO: ABNORMAL /HPF
BILIRUB UR-MCNC: NEGATIVE — SIGNIFICANT CHANGE UP
BUN SERPL-MCNC: 23 MG/DL — HIGH (ref 7–18)
CALCIUM SERPL-MCNC: 8.3 MG/DL — LOW (ref 8.4–10.5)
CHLORIDE SERPL-SCNC: 110 MMOL/L — HIGH (ref 96–108)
CO2 SERPL-SCNC: 26 MMOL/L — SIGNIFICANT CHANGE UP (ref 22–31)
COLOR SPEC: YELLOW — SIGNIFICANT CHANGE UP
CREAT SERPL-MCNC: 1.44 MG/DL — HIGH (ref 0.5–1.3)
CULTURE RESULTS: SIGNIFICANT CHANGE UP
DIFF PNL FLD: ABNORMAL
EPI CELLS # UR: SIGNIFICANT CHANGE UP /HPF
GLUCOSE BLDC GLUCOMTR-MCNC: 108 MG/DL — HIGH (ref 70–99)
GLUCOSE BLDC GLUCOMTR-MCNC: 119 MG/DL — HIGH (ref 70–99)
GLUCOSE BLDC GLUCOMTR-MCNC: 144 MG/DL — HIGH (ref 70–99)
GLUCOSE BLDC GLUCOMTR-MCNC: 198 MG/DL — HIGH (ref 70–99)
GLUCOSE BLDC GLUCOMTR-MCNC: 306 MG/DL — HIGH (ref 70–99)
GLUCOSE SERPL-MCNC: 106 MG/DL — HIGH (ref 70–99)
GLUCOSE UR QL: 250
HCT VFR BLD CALC: 38.3 % — LOW (ref 39–50)
HGB BLD-MCNC: 12.6 G/DL — LOW (ref 13–17)
KETONES UR-MCNC: NEGATIVE — SIGNIFICANT CHANGE UP
LEUKOCYTE ESTERASE UR-ACNC: ABNORMAL
MAGNESIUM SERPL-MCNC: 2.2 MG/DL — SIGNIFICANT CHANGE UP (ref 1.6–2.6)
MCHC RBC-ENTMCNC: 29.2 PG — SIGNIFICANT CHANGE UP (ref 27–34)
MCHC RBC-ENTMCNC: 32.9 GM/DL — SIGNIFICANT CHANGE UP (ref 32–36)
MCV RBC AUTO: 88.7 FL — SIGNIFICANT CHANGE UP (ref 80–100)
NITRITE UR-MCNC: NEGATIVE — SIGNIFICANT CHANGE UP
NRBC # BLD: 0 /100 WBCS — SIGNIFICANT CHANGE UP (ref 0–0)
PH UR: 5 — SIGNIFICANT CHANGE UP (ref 5–8)
PHOSPHATE SERPL-MCNC: 3.3 MG/DL — SIGNIFICANT CHANGE UP (ref 2.5–4.5)
PLATELET # BLD AUTO: 224 K/UL — SIGNIFICANT CHANGE UP (ref 150–400)
POTASSIUM SERPL-MCNC: 4.1 MMOL/L — SIGNIFICANT CHANGE UP (ref 3.5–5.3)
POTASSIUM SERPL-SCNC: 4.1 MMOL/L — SIGNIFICANT CHANGE UP (ref 3.5–5.3)
PROT UR-MCNC: 100
RBC # BLD: 4.32 M/UL — SIGNIFICANT CHANGE UP (ref 4.2–5.8)
RBC # FLD: 13 % — SIGNIFICANT CHANGE UP (ref 10.3–14.5)
RBC CASTS # UR COMP ASSIST: ABNORMAL /HPF (ref 0–2)
SODIUM SERPL-SCNC: 141 MMOL/L — SIGNIFICANT CHANGE UP (ref 135–145)
SP GR SPEC: 1.02 — SIGNIFICANT CHANGE UP (ref 1.01–1.02)
SPECIMEN SOURCE: SIGNIFICANT CHANGE UP
UROBILINOGEN FLD QL: NEGATIVE — SIGNIFICANT CHANGE UP
WBC # BLD: 7.67 K/UL — SIGNIFICANT CHANGE UP (ref 3.8–10.5)
WBC # FLD AUTO: 7.67 K/UL — SIGNIFICANT CHANGE UP (ref 3.8–10.5)
WBC UR QL: SIGNIFICANT CHANGE UP /HPF (ref 0–5)

## 2021-08-16 PROCEDURE — 76775 US EXAM ABDO BACK WALL LIM: CPT | Mod: 26

## 2021-08-16 PROCEDURE — 93923 UPR/LXTR ART STDY 3+ LVLS: CPT | Mod: 26

## 2021-08-16 PROCEDURE — 73718 MRI LOWER EXTREMITY W/O DYE: CPT | Mod: 26,RT

## 2021-08-16 RX ORDER — AMPICILLIN SODIUM AND SULBACTAM SODIUM 250; 125 MG/ML; MG/ML
3 INJECTION, POWDER, FOR SUSPENSION INTRAMUSCULAR; INTRAVENOUS EVERY 8 HOURS
Refills: 0 | Status: DISCONTINUED | OUTPATIENT
Start: 2021-08-16 | End: 2021-08-19

## 2021-08-16 RX ORDER — HYDROMORPHONE HYDROCHLORIDE 2 MG/ML
0.5 INJECTION INTRAMUSCULAR; INTRAVENOUS; SUBCUTANEOUS ONCE
Refills: 0 | Status: DISCONTINUED | OUTPATIENT
Start: 2021-08-16 | End: 2021-08-16

## 2021-08-16 RX ORDER — AMPICILLIN SODIUM AND SULBACTAM SODIUM 250; 125 MG/ML; MG/ML
3 INJECTION, POWDER, FOR SUSPENSION INTRAMUSCULAR; INTRAVENOUS ONCE
Refills: 0 | Status: COMPLETED | OUTPATIENT
Start: 2021-08-16 | End: 2021-08-16

## 2021-08-16 RX ORDER — AMPICILLIN SODIUM AND SULBACTAM SODIUM 250; 125 MG/ML; MG/ML
INJECTION, POWDER, FOR SUSPENSION INTRAMUSCULAR; INTRAVENOUS
Refills: 0 | Status: DISCONTINUED | OUTPATIENT
Start: 2021-08-16 | End: 2021-08-19

## 2021-08-16 RX ORDER — OXYCODONE AND ACETAMINOPHEN 5; 325 MG/1; MG/1
1 TABLET ORAL EVERY 8 HOURS
Refills: 0 | Status: DISCONTINUED | OUTPATIENT
Start: 2021-08-16 | End: 2021-08-19

## 2021-08-16 RX ADMIN — AMPICILLIN SODIUM AND SULBACTAM SODIUM 200 GRAM(S): 250; 125 INJECTION, POWDER, FOR SUSPENSION INTRAMUSCULAR; INTRAVENOUS at 06:37

## 2021-08-16 RX ADMIN — HYDROMORPHONE HYDROCHLORIDE 0.5 MILLIGRAM(S): 2 INJECTION INTRAMUSCULAR; INTRAVENOUS; SUBCUTANEOUS at 20:47

## 2021-08-16 RX ADMIN — AMPICILLIN SODIUM AND SULBACTAM SODIUM 200 GRAM(S): 250; 125 INJECTION, POWDER, FOR SUSPENSION INTRAMUSCULAR; INTRAVENOUS at 13:13

## 2021-08-16 RX ADMIN — ATORVASTATIN CALCIUM 40 MILLIGRAM(S): 80 TABLET, FILM COATED ORAL at 21:03

## 2021-08-16 RX ADMIN — Medication 8: at 21:56

## 2021-08-16 RX ADMIN — HYDROMORPHONE HYDROCHLORIDE 0.5 MILLIGRAM(S): 2 INJECTION INTRAMUSCULAR; INTRAVENOUS; SUBCUTANEOUS at 12:15

## 2021-08-16 RX ADMIN — HEPARIN SODIUM 5000 UNIT(S): 5000 INJECTION INTRAVENOUS; SUBCUTANEOUS at 13:13

## 2021-08-16 RX ADMIN — OXYCODONE AND ACETAMINOPHEN 1 TABLET(S): 5; 325 TABLET ORAL at 23:56

## 2021-08-16 RX ADMIN — Medication 81 MILLIGRAM(S): at 11:52

## 2021-08-16 RX ADMIN — OXYCODONE AND ACETAMINOPHEN 1 TABLET(S): 5; 325 TABLET ORAL at 18:30

## 2021-08-16 RX ADMIN — HEPARIN SODIUM 5000 UNIT(S): 5000 INJECTION INTRAVENOUS; SUBCUTANEOUS at 05:50

## 2021-08-16 RX ADMIN — HYDROMORPHONE HYDROCHLORIDE 0.5 MILLIGRAM(S): 2 INJECTION INTRAMUSCULAR; INTRAVENOUS; SUBCUTANEOUS at 08:08

## 2021-08-16 RX ADMIN — HYDROMORPHONE HYDROCHLORIDE 0.5 MILLIGRAM(S): 2 INJECTION INTRAMUSCULAR; INTRAVENOUS; SUBCUTANEOUS at 08:40

## 2021-08-16 RX ADMIN — HYDROMORPHONE HYDROCHLORIDE 0.5 MILLIGRAM(S): 2 INJECTION INTRAMUSCULAR; INTRAVENOUS; SUBCUTANEOUS at 11:51

## 2021-08-16 RX ADMIN — HYDROMORPHONE HYDROCHLORIDE 1 MILLIGRAM(S): 2 INJECTION INTRAMUSCULAR; INTRAVENOUS; SUBCUTANEOUS at 04:03

## 2021-08-16 RX ADMIN — AMPICILLIN SODIUM AND SULBACTAM SODIUM 200 GRAM(S): 250; 125 INJECTION, POWDER, FOR SUSPENSION INTRAMUSCULAR; INTRAVENOUS at 21:53

## 2021-08-16 RX ADMIN — HEPARIN SODIUM 5000 UNIT(S): 5000 INJECTION INTRAVENOUS; SUBCUTANEOUS at 21:02

## 2021-08-16 RX ADMIN — OXYCODONE AND ACETAMINOPHEN 1 TABLET(S): 5; 325 TABLET ORAL at 17:32

## 2021-08-16 RX ADMIN — SODIUM CHLORIDE 75 MILLILITER(S): 9 INJECTION INTRAMUSCULAR; INTRAVENOUS; SUBCUTANEOUS at 20:26

## 2021-08-16 RX ADMIN — INSULIN GLARGINE 7 UNIT(S): 100 INJECTION, SOLUTION SUBCUTANEOUS at 21:53

## 2021-08-16 RX ADMIN — Medication 50 MILLIGRAM(S): at 05:50

## 2021-08-16 NOTE — PROGRESS NOTE ADULT - ASSESSMENT
Assessment  Diabetic foot ulcer Right  DM type 2  R/o Osteomyelitis       Plan  Patient evaluated and chart created  X ray reviewed - no soft tissue gas noted  Monitor labs  Pending SIMON/PVR  Recommend Vascular consult   Dressed the right foot wound with betadine and DSD   MRI of the right foot pending - rule out osteomyelitis  Cont Abx per ID   Podiatry will follow while in house  Discussed and seen bedside with attending Dr. Vazquez

## 2021-08-16 NOTE — PROGRESS NOTE ADULT - SUBJECTIVE AND OBJECTIVE BOX
PGY-1 Progress Note discussed with attending    CHIEF COMPLAINT & BRIEF HOSPITAL COURSE:  Patient is a 76yoM, ambulates and performs ADL independently, w/ PMH of CAD (s/p CABG >10yrs ago), DM, HTN, and HLD, presents with right foot pain x2 weeks. Patient is admitted for diabetic foot ulcer.    INTERVAL HPI/OVERNIGHT EVENTS:   Patient reports pain of R foot, controlled. No overnight events noted.     REVIEW OF SYSTEMS:  CONSTITUTIONAL: No fever, weight loss, or fatigue  RESPIRATORY: No cough, wheezing, chills or hemoptysis; No shortness of breath  CARDIOVASCULAR: No chest pain, palpitations, dizziness, or leg swelling  GASTROINTESTINAL: No abdominal pain. No nausea, vomiting, or hematemesis; No diarrhea or constipation. No melena or hematochezia.  GENITOURINARY: No dysuria or hematuria, urinary frequency  NEUROLOGICAL: No headaches, memory loss, loss of strength, numbness, or tremors  SKIN: No itching, burning, rashes, or lesions     MEDICATIONS  (STANDING):  ampicillin/sulbactam  IVPB      ampicillin/sulbactam  IVPB 3 Gram(s) IV Intermittent every 8 hours  aspirin  chewable 81 milliGRAM(s) Oral daily  atorvastatin 40 milliGRAM(s) Oral at bedtime  dextrose 40% Gel 15 Gram(s) Oral once  dextrose 5%. 1000 milliLiter(s) (50 mL/Hr) IV Continuous <Continuous>  dextrose 5%. 1000 milliLiter(s) (100 mL/Hr) IV Continuous <Continuous>  dextrose 50% Injectable 25 Gram(s) IV Push once  dextrose 50% Injectable 12.5 Gram(s) IV Push once  dextrose 50% Injectable 25 Gram(s) IV Push once  glucagon  Injectable 1 milliGRAM(s) IntraMuscular once  heparin   Injectable 5000 Unit(s) SubCutaneous every 8 hours  insulin glargine Injectable (LANTUS) 7 Unit(s) SubCutaneous at bedtime  insulin lispro (ADMELOG) corrective regimen sliding scale   SubCutaneous Before meals and at bedtime  metoprolol succinate ER 50 milliGRAM(s) Oral daily  sodium chloride 0.9%. 1000 milliLiter(s) (75 mL/Hr) IV Continuous <Continuous>    MEDICATIONS  (PRN):  HYDROmorphone  Injectable 0.5 milliGRAM(s) IV Push every 3 hours PRN Moderate Pain (4 - 6)  HYDROmorphone  Injectable 1 milliGRAM(s) IV Push every 3 hours PRN Severe Pain (7 - 10)      Vital Signs Last 24 Hrs  T(C): 36.3 (16 Aug 2021 04:35), Max: 36.6 (15 Aug 2021 21:43)  T(F): 97.3 (16 Aug 2021 04:35), Max: 97.9 (15 Aug 2021 21:43)  HR: 76 (16 Aug 2021 04:35) (73 - 79)  BP: 166/74 (16 Aug 2021 04:35) (143/57 - 167/96)  BP(mean): --  RR: 18 (16 Aug 2021 04:35) (18 - 18)  SpO2: 96% (16 Aug 2021 04:35) (95% - 97%)    PHYSICAL EXAMINATION:  GENERAL: NAD, well built  HEAD:  Atraumatic, Normocephalic  EYES:  conjunctiva and sclera clear  NECK: Supple, No JVD, Normal thyroid  CHEST/LUNG: Clear to auscultation. Clear to percussion bilaterally; No rales, rhonchi, wheezing, or rubs  HEART: Regular rate and rhythm; No murmurs, rubs, or gallops  ABDOMEN: Soft, Nontender, Nondistended; Bowel sounds present, no pain or masses on palpation  NERVOUS SYSTEM:  Alert & Oriented X3  : voiding well  EXTREMITIES:  2+ Peripheral Pulses, No clubbing, cyanosis, or edema  SKIN: warm dry                          12.6   7.67  )-----------( 224      ( 16 Aug 2021 13:13 )             38.3     08-16    141  |  110<H>  |  23<H>  ----------------------------<  106<H>  4.1   |  26  |  1.44<H>    Ca    8.3<L>      16 Aug 2021 13:13  Phos  3.3     08-16  Mg     2.2     08-16    TPro  7.1  /  Alb  3.4<L>  /  TBili  0.6  /  DBili  x   /  AST  11  /  ALT  16  /  AlkPhos  145<H>  08-15    LIVER FUNCTIONS - ( 15 Aug 2021 06:18 )  Alb: 3.4 g/dL / Pro: 7.1 g/dL / ALK PHOS: 145 U/L / ALT: 16 U/L DA / AST: 11 U/L / GGT: x                   I&O's Summary        Culture - Blood (collected 14 Aug 2021 21:09)  Source: .Blood Blood-Venous  Preliminary Report (15 Aug 2021 22:02):    No growth to date.    Culture - Blood (collected 14 Aug 2021 21:09)  Source: .Blood Blood-Venous  Preliminary Report (15 Aug 2021 22:02):    No growth to date.    Culture - Surgical Swab (collected 14 Aug 2021 21:08)  Source: .Surgical Swab Right foot plantar wound  Preliminary Report (15 Aug 2021 19:40):    Moderate Streptococcus agalactiae (Group B) isolated    Group B streptococci are susceptible to ampicillin,    penicillin and cefazolin, but may be resistant to    erythromycin and clindamycin.    Recommendations for intrapartum prophylaxis for Group B    streptococci are penicillin or ampicillin.    Normal skin filiberto isolated        CAPILLARY BLOOD GLUCOSE      RADIOLOGY & ADDITIONAL TESTS:

## 2021-08-16 NOTE — CONSULT NOTE ADULT - SUBJECTIVE AND OBJECTIVE BOX
NEPHROLOGY MEDICAL CARE, [facility] - Dr. Tariq Herrera/ Dr. Lauren Lopez/ Dr. Dre Call/ Dr. Destiny Knott    Patient was seen and examined at bedside.     Consultation requested by:  Alicja Samaniego    Reason for Consult: OREN vs CKD    HPI:  Patient is a 76yoM, ambulates and performs ADL independently, w/ PMH of CAD (s/p CABG >10yrs ago), DM, HTN, and HLD, presents with right foot pain x2 weeks. He reports 10/10, progressive, intermittent, sharp, non-radiating, right foot pain. He states stepping on a dog treat approximately 3 weeks ago, which he notice a development of wound at the time. Right foot pain developed a week later. It was mild initially, but progressive has gotten worsen. Patient has been non-compliant with medications for the past 2 months, because he has been feeling great. Renal consulted for OREN vs CKD. Patient was not aware of any kidney disease in the past.  Patient was admitted with scr around 1.59mg/dL and slowly improving to 1.4mg/dL. Patient last scr around 1.74mg/dL in 2021. Patient was given IVFs on admission. He denies fever, chills, n/v, dizziness, chest pain, sob, abdominal pain, urinary or bowel movement changes.       PMH:   HTN (hypertension)  HLD (hyperlipidemia)  DM (diabetes mellitus)  CAD (coronary artery disease)  Glaucoma, angle-closure        PSH:   S/P CABG (coronary artery bypass graft)  History of thyroid surgery        FAMILY HISTORY:  Family history of acute myocardial infarction (Father)  Family history of diabetes mellitus (Mother, Sibling)  Family history of hypertension (Mother, Sibling)  Family history of hyperlipidemia (Mother, Sibling)        Social History:  non-smoker/ non-alcoholic     Home Meds:  Home Medications:  isosorbide mononitrate 30 mg oral tablet, extended release: 1 tab(s) orally once a day (in the morning) (14 Aug 2021 13:35)  Levemir 100 units/mL subcutaneous solution: 38 unit(s) subcutaneous once a day (at bedtime) (14 Aug 2021 13:35)  metFORMIN 500 mg oral tablet: 1 tab(s) orally once a day (at bedtime) (14 Aug 2021 13:35)  metoprolol succinate 200 mg oral tablet, extended release: 1  orally once a day (14 Aug 2021 13:35)  rosuvastatin 40 mg oral tablet: 1 tab(s) orally once a day (14 Aug 2021 13:35)      Allergies:  Allergies    No Known Allergies    Intolerances        REVIEW OF SYSTEMS:  CONSTITUTIONAL: No fever; No weight loss; No fatigue  EYES: No eye pain; No visual disturbances; No discharge  ENMT:  No difficulty hearing; No tinnitus;  No vertigo; No sinus; No throat pain  NECK: No pain; No stiffness  BREASTS: No pain; No masses; No nipple discharge  RESPIRATORY: No cough; No wheezing; No chills; No hemoptysis; No shortness of breath  CARDIOVASCULAR: No chest pain; No palpitations; No dizziness; No leg swelling  GASTROINTESTINAL: No abdominal pain; No epigastric pain; No nausea; No vomiting; No hematemesis; No diarrhea; No constipation. No melena   GENITOURINARY: No dysuria No frequency; No hematuria; No incontinence  NEUROLOGICAL: No headaches; No memory loss; No loss of strength; No numbness; No tremors  SKIN: No itching; No burning; No rashes  ENDOCRINE: No heat or cold intolerance; No hair loss  MUSCULOSKELETAL: No joint pain or swelling; No muscle, back.  extremity pain present.  PSYCHIATRIC: No depression; No anxiety; No mood swings; No difficulty sleeping  HEME/LYMPH: No easy bruising; No bleeding gums  ALLERY AND IMMUNOLOGIC: No hives or eczema    Vital Signs Last 24 Hrs  T(C): 36.9 (16 Aug 2021 14:31), Max: 36.9 (16 Aug 2021 14:31)  T(F): 98.5 (16 Aug 2021 14:31), Max: 98.5 (16 Aug 2021 14:31)  HR: 81 (16 Aug 2021 14:31) (76 - 81)  BP: 169/75 (16 Aug 2021 14:31) (143/57 - 169/75)  BP(mean): --  RR: 18 (16 Aug 2021 14:31) (18 - 18)  SpO2: 96% (16 Aug 2021 14:31) (96% - 97%)        PHYSICAL EXAM:  General: No acute respiratory distress.  Eyes: conjunctiva and sclera clear  ENMT: Atraumatic, Normocephalic, supple, No JVD present. Moist mucous membranes  Respiratory: Clear to percussion bilaterally; No rales, rhonchi, wheezing  Cardiovasular: S1S2+; no r/g; systolic murmur  Gastrointestinal: Soft, Non-tender, Nondistended; Bowel sounds present, no hepatosplenomegaly.   Neuro:  Awake, Alert & Oriented X3, No focal deficits present.   Ext:  2+ Peripheral Pulses and No edema, No Cyanosis; Rt foot bandage.  Skin: No visible rashes        LABS:                        12.6   7.67  )-----------( 224      ( 16 Aug 2021 13:13 )             38.3         141  |  110<H>  |  23<H>  ----------------------------<  106<H>  4.1   |  26  |  1.44<H>    Ca    8.3<L>      16 Aug 2021 13:13  Phos  3.3       Mg     2.2         TPro  7.1  /  Alb  3.4<L>  /  TBili  0.6  /  DBili  x   /  AST  11  /  ALT  16  /  AlkPhos  145<H>  08-15      Urinalysis Basic - ( 16 Aug 2021 00:18 )    Color: Yellow / Appearance: Clear / S.020 / pH: x  Gluc: x / Ketone: Negative  / Bili: Negative / Urobili: Negative   Blood: x / Protein: 100 / Nitrite: Negative   Leuk Esterase: Trace / RBC: 5-10 /HPF / WBC 3-5 /HPF   Sq Epi: x / Non Sq Epi: Few /HPF / Bacteria: Moderate /HPF      Magnesium, Serum: 2.2 mg/dL ( @ 13:13)  Phosphorus Level, Serum: 3.3 mg/dL ( @ 13:13)    Urine studies      Medications:  MEDICATIONS  (STANDING):  ampicillin/sulbactam  IVPB      ampicillin/sulbactam  IVPB 3 Gram(s) IV Intermittent every 8 hours  aspirin  chewable 81 milliGRAM(s) Oral daily  atorvastatin 40 milliGRAM(s) Oral at bedtime  dextrose 40% Gel 15 Gram(s) Oral once  dextrose 5%. 1000 milliLiter(s) (50 mL/Hr) IV Continuous <Continuous>  dextrose 5%. 1000 milliLiter(s) (100 mL/Hr) IV Continuous <Continuous>  dextrose 50% Injectable 25 Gram(s) IV Push once  dextrose 50% Injectable 12.5 Gram(s) IV Push once  dextrose 50% Injectable 25 Gram(s) IV Push once  glucagon  Injectable 1 milliGRAM(s) IntraMuscular once  heparin   Injectable 5000 Unit(s) SubCutaneous every 8 hours  insulin glargine Injectable (LANTUS) 7 Unit(s) SubCutaneous at bedtime  insulin lispro (ADMELOG) corrective regimen sliding scale   SubCutaneous Before meals and at bedtime  metoprolol succinate ER 50 milliGRAM(s) Oral daily  oxycodone    5 mG/acetaminophen 325 mG 1 Tablet(s) Oral every 8 hours  sodium chloride 0.9%. 1000 milliLiter(s) (75 mL/Hr) IV Continuous <Continuous>    MEDICATIONS  (PRN):

## 2021-08-16 NOTE — PROGRESS NOTE ADULT - SUBJECTIVE AND OBJECTIVE BOX
Patient is seen and examined at the bed side, is afebrile. The Blood cultures have no growth to date and wound culture grew Streptococcus agalactiae (Group B).       REVIEW OF SYSTEMS: All other review systems are negative      ALLERGIES: No Known Allergies      Vital Signs Last 24 Hrs  T(C): 36.9 (16 Aug 2021 14:31), Max: 36.9 (16 Aug 2021 14:31)  T(F): 98.5 (16 Aug 2021 14:31), Max: 98.5 (16 Aug 2021 14:31)  HR: 81 (16 Aug 2021 14:31) (76 - 81)  BP: 169/75 (16 Aug 2021 14:31) (143/57 - 169/75)  BP(mean): --  RR: 18 (16 Aug 2021 14:31) (18 - 18)  SpO2: 96% (16 Aug 2021 14:31) (96% - 97%)      PHYSICAL EXAM:  GENERAL: Not in distress   CHEST/LUNG:  Aire ntry bilaterally  HEART: s1 and s2 present  ABDOMEN:  Nontender and  Nondistended  EXTREMITIES: Right foot bandage in placed  CNS: Awake and Alert      LABS:                        12.6   7.67  )-----------( 224      ( 16 Aug 2021 13:13 )             38.3                           12.2   7.02  )-----------( 215      ( 15 Aug 2021 06:18 )             36.5                08-16    141  |  110<H>  |  23<H>  ----------------------------<  106<H>  4.1   |  26  |  1.44<H>    Ca    8.3<L>      16 Aug 2021 13:13  Phos  3.3     08-16  Mg     2.2     08-16    TPro  7.1  /  Alb  3.4<L>  /  TBili  0.6  /  DBili  x   /  AST  11  /  ALT  16  /  AlkPhos  145<H>  08-15    08-15    137  |  104  |  25<H>  ----------------------------<  245<H>  4.0   |  24  |  1.62<H>    Ca    8.6      15 Aug 2021 06:18  Phos  3.2     08-15  Mg     2.3     08-15    TPro  7.1  /  Alb  3.4<L>  /  TBili  0.6  /  DBili  x   /  AST  11  /  ALT  16  /  AlkPhos  145<H>  08-15        CAPILLARY BLOOD GLUCOSE  POCT Blood Glucose.: 305 mg/dL (14 Aug 2021 16:20)  POCT Blood Glucose.: 281 mg/dL (14 Aug 2021 07:29)        MEDICATIONS  (STANDING):    ampicillin/sulbactam  IVPB      ampicillin/sulbactam  IVPB 3 Gram(s) IV Intermittent every 8 hours  aspirin  chewable 81 milliGRAM(s) Oral daily  atorvastatin 40 milliGRAM(s) Oral at bedtime  dextrose 40% Gel 15 Gram(s) Oral once  dextrose 5%. 1000 milliLiter(s) (50 mL/Hr) IV Continuous <Continuous>  dextrose 5%. 1000 milliLiter(s) (100 mL/Hr) IV Continuous <Continuous>  dextrose 50% Injectable 25 Gram(s) IV Push once  dextrose 50% Injectable 12.5 Gram(s) IV Push once  dextrose 50% Injectable 25 Gram(s) IV Push once  glucagon  Injectable 1 milliGRAM(s) IntraMuscular once  heparin   Injectable 5000 Unit(s) SubCutaneous every 8 hours  insulin glargine Injectable (LANTUS) 7 Unit(s) SubCutaneous at bedtime  insulin lispro (ADMELOG) corrective regimen sliding scale   SubCutaneous Before meals and at bedtime  metoprolol succinate ER 50 milliGRAM(s) Oral daily  oxycodone    5 mG/acetaminophen 325 mG 1 Tablet(s) Oral every 8 hours  sodium chloride 0.9%. 1000 milliLiter(s) (75 mL/Hr) IV Continuous <Continuous>        RADIOLOGY & ADDITIONAL TESTS:      ra< from: US Renal (08.16.21 @ 17:02) >    Normal renal ultrasound.    < from: MR Foot No Cont, Right (08.16.21 @ 16:52) >  The exam is significantly limited by motion artifact. There is a cutaneous wound along the plantar aspect of the fifth metatarsal head with adjacent cellulitic change. There is high T2 and low T1 marrow signal within the plantar aspect of the fifth metatarsal head, consistent with osteomyelitis. There is diffuse fatty atrophy and high T2 signal of musculature, consistent with denervation. There is dorsal subcutaneous soft tissue edema.      < from: Xray Foot AP + Lateral + Oblique, Right (08.14.21 @ 09:51) >  IMPRESSION: No fracture dislocation or focal bone destruction. No unusual periosteal reaction. Joint spaces preserved. Calcaneal enthesopathy. Extensive calcification/ossification in the region of the plantar fascia . Small vessel calcification. Diffuse soft tissue swelling may reflect cellulitis. No soft tissue gas.        MICROBIOLOGY DATA:    Culture - Blood (08.14.21 @ 21:09)   Specimen Source: .Blood Blood-Venous   Culture Results: No growth to date.     Culture - Blood (08.14.21 @ 21:09)   Specimen Source: .Blood Blood-Venous   Culture Results: No growth to date.     COVID-19 David Domain Antibody (08.15.21 @ 11:30)   COVID-19 David Domain Antibody Result: >250.00    Culture - Surgical Swab (08.14.21 @ 21:08)   Specimen Source: .Surgical Swab Right foot plantar wound   Culture Results:   Moderate Streptococcus agalactiae (Group B) isolated   Group B streptococci are susceptible to ampicillin,   penicillin and cefazolin, but may be resistant to   erythromycin and clindamycin.   Recommendations for intrapartum prophylaxis for Group B   streptococci are penicillin or ampicillin.   Normal skin filiberto isolated     COVID-19 PCR . (08.14.21 @ 09:41)   COVID-19 PCR: NotDetec             Patient is seen and examined at the bed side, is afebrile. The Blood cultures remain negative to date. The MRI of Right foot shows Osteomyelitis of plantar aspect of the fifth metatarsal head.      REVIEW OF SYSTEMS: All other review systems are negative      ALLERGIES: No Known Allergies      Vital Signs Last 24 Hrs  T(C): 36.9 (16 Aug 2021 14:31), Max: 36.9 (16 Aug 2021 14:31)  T(F): 98.5 (16 Aug 2021 14:31), Max: 98.5 (16 Aug 2021 14:31)  HR: 81 (16 Aug 2021 14:31) (76 - 81)  BP: 169/75 (16 Aug 2021 14:31) (143/57 - 169/75)  BP(mean): --  RR: 18 (16 Aug 2021 14:31) (18 - 18)  SpO2: 96% (16 Aug 2021 14:31) (96% - 97%)        PHYSICAL EXAM:  GENERAL: Not in distress   CHEST/LUNG:  Not using accessory muscles   HEART: s1 and s2 present  ABDOMEN:  Nontender and  Nondistended  EXTREMITIES: Right foot bandage in placed  CNS: Awake and Alert        LABS:                        12.6   7.67  )-----------( 224      ( 16 Aug 2021 13:13 )             38.3                           12.2   7.02  )-----------( 215      ( 15 Aug 2021 06:18 )             36.5                08-16    141  |  110<H>  |  23<H>  ----------------------------<  106<H>  4.1   |  26  |  1.44<H>    Ca    8.3<L>      16 Aug 2021 13:13  Phos  3.3     08-16  Mg     2.2     08-16    TPro  7.1  /  Alb  3.4<L>  /  TBili  0.6  /  DBili  x   /  AST  11  /  ALT  16  /  AlkPhos  145<H>  08-15    08-15    137  |  104  |  25<H>  ----------------------------<  245<H>  4.0   |  24  |  1.62<H>    Ca    8.6      15 Aug 2021 06:18  Phos  3.2     08-15  Mg     2.3     08-15    TPro  7.1  /  Alb  3.4<L>  /  TBili  0.6  /  DBili  x   /  AST  11  /  ALT  16  /  AlkPhos  145<H>  08-15        CAPILLARY BLOOD GLUCOSE  POCT Blood Glucose.: 305 mg/dL (14 Aug 2021 16:20)  POCT Blood Glucose.: 281 mg/dL (14 Aug 2021 07:29)        MEDICATIONS  (STANDING):    ampicillin/sulbactam  IVPB      ampicillin/sulbactam  IVPB 3 Gram(s) IV Intermittent every 8 hours  aspirin  chewable 81 milliGRAM(s) Oral daily  atorvastatin 40 milliGRAM(s) Oral at bedtime  dextrose 40% Gel 15 Gram(s) Oral once  dextrose 5%. 1000 milliLiter(s) (50 mL/Hr) IV Continuous <Continuous>  dextrose 5%. 1000 milliLiter(s) (100 mL/Hr) IV Continuous <Continuous>  dextrose 50% Injectable 25 Gram(s) IV Push once  dextrose 50% Injectable 12.5 Gram(s) IV Push once  dextrose 50% Injectable 25 Gram(s) IV Push once  glucagon  Injectable 1 milliGRAM(s) IntraMuscular once  heparin   Injectable 5000 Unit(s) SubCutaneous every 8 hours  insulin glargine Injectable (LANTUS) 7 Unit(s) SubCutaneous at bedtime  insulin lispro (ADMELOG) corrective regimen sliding scale   SubCutaneous Before meals and at bedtime  metoprolol succinate ER 50 milliGRAM(s) Oral daily  oxycodone    5 mG/acetaminophen 325 mG 1 Tablet(s) Oral every 8 hours  sodium chloride 0.9%. 1000 milliLiter(s) (75 mL/Hr) IV Continuous <Continuous>        RADIOLOGY & ADDITIONAL TESTS:      8/16/21: US Renal (08.16.21 @ 17:02) Normal renal ultrasound.    8/16/21: MR Foot No Cont, Right (08.16.21 @ 16:52) The exam is significantly limited by motion artifact. There is a cutaneous wound along the plantar aspect of the fifth metatarsal head with adjacent cellulitic change. There is high T2 and low T1 marrow signal within the plantar aspect of the fifth metatarsal head, consistent with osteomyelitis. There is diffuse fatty atrophy and high T2 signal of musculature, consistent with denervation. There is dorsal subcutaneous soft tissue edema.      < from: Xray Foot AP + Lateral + Oblique, Right (08.14.21 @ 09:51) >  IMPRESSION: No fracture dislocation or focal bone destruction. No unusual periosteal reaction. Joint spaces preserved. Calcaneal enthesopathy. Extensive calcification/ossification in the region of the plantar fascia . Small vessel calcification. Diffuse soft tissue swelling may reflect cellulitis. No soft tissue gas.        MICROBIOLOGY DATA:    Culture - Blood (08.14.21 @ 21:09)   Specimen Source: .Blood Blood-Venous   Culture Results: No growth to date.     Culture - Blood (08.14.21 @ 21:09)   Specimen Source: .Blood Blood-Venous   Culture Results: No growth to date.     COVID-19 David Domain Antibody (08.15.21 @ 11:30)   COVID-19 David Domain Antibody Result: >250.00    Culture - Surgical Swab (08.14.21 @ 21:08)   Specimen Source: .Surgical Swab Right foot plantar wound   Culture Results:   Moderate Streptococcus agalactiae (Group B) isolated   Group B streptococci are susceptible to ampicillin,   penicillin and cefazolin, but may be resistant to   erythromycin and clindamycin.   Recommendations for intrapartum prophylaxis for Group B   streptococci are penicillin or ampicillin.   Normal skin filiberto isolated     COVID-19 PCR . (08.14.21 @ 09:41)   COVID-19 PCR: NotDetec

## 2021-08-16 NOTE — PROGRESS NOTE ADULT - SUBJECTIVE AND OBJECTIVE BOX
Patient is a 76y old  Male who presents with a chief complaint of diabetic ulcer (14 Aug 2021 13:11)    Podiatry Interval HPI: Patient was seen bedside in no acute distress. Patient states the wound to R foot is very painful. Patient is unable to walk due to the pain. Patient states wound has been there for about 3 weeks and could be due to stepping on a dog treat. Denies N/V/F/C/SOB. No other pedal complaints.       Podiatry HPI: Podiatry consulted regarding 75 yo male patient who presents to ED with a chief complaint of right foot plantar ulcer. Patient states that he noticed the ulcer about 3 weeks ago. Denies any trauma or injury. He recalls stepping on a dog treat and isn't sure if that caused an opening on the bottom of his right foot. Patient states that he has been diagnosed with diabetes for a long time. Patient reports that the ulcer became painful and can hardly ambulate due to pain. Pt rates the pain 10/10 on the VAS. Denies other pedal complaints. Denies constitutional symptoms of N/V/C/F/Sob.     HPI:  Patient is a 76yoM, ambulates and performs ADL independently, w/ PMH of CAD (s/p CABG >10yrs ago), DM, HTN, and HLD, presents with right foot pain x2 weeks. He reports 10/10, progressive, intermittent, sharp, non-radiating, right foot pain. He states stepping on a dog treat approximately 3 weeks ago, which he notice a development of wound at the time. Right foot pain developed a week later. It was mild initially, but progressive has gotten worsen. Patient has been non-compliant with medications for the past 2 months, because he has been feeling great. He denies fever, chills, n/v, dizziness, chest pain, sob, abdominal pain, urinary or bowel movement changes.  (14 Aug 2021 13:11)      Medications ampicillin/sulbactam  IVPB      ampicillin/sulbactam  IVPB 3 Gram(s) IV Intermittent every 8 hours  aspirin  chewable 81 milliGRAM(s) Oral daily  atorvastatin 40 milliGRAM(s) Oral at bedtime  dextrose 40% Gel 15 Gram(s) Oral once  dextrose 5%. 1000 milliLiter(s) IV Continuous <Continuous>  dextrose 5%. 1000 milliLiter(s) IV Continuous <Continuous>  dextrose 50% Injectable 25 Gram(s) IV Push once  dextrose 50% Injectable 12.5 Gram(s) IV Push once  dextrose 50% Injectable 25 Gram(s) IV Push once  glucagon  Injectable 1 milliGRAM(s) IntraMuscular once  heparin   Injectable 5000 Unit(s) SubCutaneous every 8 hours  insulin glargine Injectable (LANTUS) 7 Unit(s) SubCutaneous at bedtime  insulin lispro (ADMELOG) corrective regimen sliding scale   SubCutaneous Before meals and at bedtime  metoprolol succinate ER 50 milliGRAM(s) Oral daily  oxycodone    5 mG/acetaminophen 325 mG 1 Tablet(s) Oral every 8 hours  sodium chloride 0.9%. 1000 milliLiter(s) IV Continuous <Continuous>    FHFamily history of acute myocardial infarction (Father)    Family history of diabetes mellitus (Mother, Sibling)    Family history of hypertension (Mother, Sibling)    Family history of hyperlipidemia (Mother, Sibling)    ,   PMHHTN (hypertension)    HLD (hyperlipidemia)    DM (diabetes mellitus)    CAD (coronary artery disease)    Glaucoma, angle-closure       PSHNo significant past surgical history    S/P CABG (coronary artery bypass graft)    History of thyroid surgery        Labs                          12.6   7.67  )-----------( 224      ( 16 Aug 2021 13:13 )             38.3      08-16    141  |  110<H>  |  23<H>  ----------------------------<  106<H>  4.1   |  26  |  1.44<H>    Ca    8.3<L>      16 Aug 2021 13:13  Phos  3.3     08-16  Mg     2.2     08-16    TPro  7.1  /  Alb  3.4<L>  /  TBili  0.6  /  DBili  x   /  AST  11  /  ALT  16  /  AlkPhos  145<H>  08-15     Vital Signs Last 24 Hrs  T(C): 36.9 (16 Aug 2021 14:31), Max: 36.9 (16 Aug 2021 14:31)  T(F): 98.5 (16 Aug 2021 14:31), Max: 98.5 (16 Aug 2021 14:31)  HR: 81 (16 Aug 2021 14:31) (76 - 81)  BP: 169/75 (16 Aug 2021 14:31) (143/57 - 169/75)  BP(mean): --  RR: 18 (16 Aug 2021 14:31) (18 - 18)  SpO2: 96% (16 Aug 2021 14:31) (96% - 97%)  Sedimentation Rate, Erythrocyte: 44 mm/Hr (08-15-21 @ 06:18)         C-Reactive Protein, Serum: 67 mg/L (08-15-21 @ 11:55)   WBC Count: 7.67 K/uL (08-16-21 @ 13:13)      PHYSICAL EXAM  GEN: SHER ROBERT is a pleasant well-nourished, well developed 76y Male in no acute distress, alert awake, and oriented to person, place and time.   LE Focused:    Vasc: DP/PT 2/4 on the left, 1/4 on the right, CFT brisk to all digits, TG warm to warm on the LLE, localized edema and erythema on the lateral dorsal aspect of the right foot  Derm: Ulceration noted on submet 5 of right foot with macerated periwound and fibrotic base, measuring 0.9 x 0.5 cm. + PTB, no malodor, no tunneling, no purulent drainage upon expression  Neuro: Protective sensation and epicritic sensation grossly diminished b/l  MSK: Pain localized to the lateral aspect of the right forefoot, able to move extremities in all compartments         Imaging:   x ray of the left foot   EXAM:  XR FOOT COMP MIN 3 VIEWS RT                          PROCEDURE DATE:  08/14/2021      INTERPRETATION:  Diabetic foot ulcer cellulitis.    3 views right foot.    IMPRESSION: No fracture dislocation or focal bone destruction. No unusual periosteal reaction. Joint spaces preserved. Calcaneal enthesopathy. Extensive calcification/ossification in the region of the plantar fascia . Small vessel calcification. Diffuse soft tissue swelling may reflect cellulitis. No soft tissue gas.          Microbiology  Culture Results:   Moderate Streptococcus agalactiae (Group B) isolated   Group B streptococci are susceptible to ampicillin,   penicillin and cefazolin, but may be resistant to   erythromycin and clindamycin.   Recommendations for intrapartum prophylaxis for Group B   streptococci are penicillin or ampicillin.   Normal skin filiberto isolated (08.14.21 @ 21:08)

## 2021-08-16 NOTE — CONSULT NOTE ADULT - ASSESSMENT
1. OREN due to pre-renal azotemia due to volume depletion.  -Scr is slowly improving and caution with fluids since h/o Severe AS  -urinalysis shows proteinuria; send urine lytes (uosm, urine sodium, urine creatinine, chloride, potassium), spot protein to creatinine ratio  -order renal sono to assess kidney size and r/o hydronephrosis.   -Adjust meds to eGFR and avoid IV Gadolinium contrast,NSAIDs, and phosphate enema.  -Monitor I/O's daily.   -Monitor SMA daily.  2. r/o CKD most likely due to diabetic nephropathy and hypertensive nephrosclerosis  -order renal sono and uPCR.  -Keep patient euvolemic and renal diet  -Avoid Nephrotoxic Meds/ Agents such as (NSAIDs, IV contrast, Aminoglycosides such as gentamicin, -Gadolinium contrast, Phosphate containing enemas, etc..)  -Adjust Medications according to eGFR  3. HTN: -bp is acceptable. continue bp meds  -titrate bp meds to keep sbp >110 and < 130  4. Mineral Bone Disease:  -Order phos, Vitamin 25 OH, and PTH intact in am  5. Anemia rule out iron deficiency vs anemia of chronic disease vs anemia of ckd:  -check iron panel, ferritin, folate, vitamin B12   -F/u CBC daily  -transfuse if HB < 7.0.  6. Rt foot ulcer: on unasyn and Plan as per podiatry; awaiting MRI of foot r/o osteo.    Discussed with patient in detail regarding the renal plan and care  Discussed the assessment and plan with Primary Team/Nurse

## 2021-08-16 NOTE — PROGRESS NOTE ADULT - PROBLEM SELECTOR PLAN 5
- h/o HTN, non-compliant w/ medications; previously on metoprolol 200mg qd, [id number] qd, and isosorbide 30mg qd  - c/w metoprolol 50mg for now  - resume other home meds as needed

## 2021-08-16 NOTE — PROGRESS NOTE ADULT - SUBJECTIVE AND OBJECTIVE BOX
C A R D I O L O G Y  **********************************     DATE OF SERVICE: 08-16-21    Patient denies chest pain or shortness of breath.   Review of systems otherwise (-)  	  MEDICATIONS:  MEDICATIONS  (STANDING):  ampicillin/sulbactam  IVPB      ampicillin/sulbactam  IVPB 3 Gram(s) IV Intermittent every 8 hours  aspirin  chewable 81 milliGRAM(s) Oral daily  atorvastatin 40 milliGRAM(s) Oral at bedtime  dextrose 40% Gel 15 Gram(s) Oral once  dextrose 5%. 1000 milliLiter(s) (50 mL/Hr) IV Continuous <Continuous>  dextrose 5%. 1000 milliLiter(s) (100 mL/Hr) IV Continuous <Continuous>  dextrose 50% Injectable 25 Gram(s) IV Push once  dextrose 50% Injectable 12.5 Gram(s) IV Push once  dextrose 50% Injectable 25 Gram(s) IV Push once  glucagon  Injectable 1 milliGRAM(s) IntraMuscular once  heparin   Injectable 5000 Unit(s) SubCutaneous every 8 hours  insulin glargine Injectable (LANTUS) 7 Unit(s) SubCutaneous at bedtime  insulin lispro (ADMELOG) corrective regimen sliding scale   SubCutaneous Before meals and at bedtime  metoprolol succinate ER 50 milliGRAM(s) Oral daily  sodium chloride 0.9%. 1000 milliLiter(s) (75 mL/Hr) IV Continuous <Continuous>      LABS:	 	    CARDIAC MARKERS:  CARDIAC MARKERS ( 14 Aug 2021 09:43 )  <0.015 ng/mL / x     / 88 U/L / x     / x                                    12.2   7.02  )-----------( 215      ( 15 Aug 2021 06:18 )             36.5     Hemoglobin: 12.2 g/dL (08-15 @ 06:18)  Hemoglobin: 12.7 g/dL (08-14 @ 09:43)      08-15    137  |  104  |  25<H>  ----------------------------<  245<H>  4.0   |  24  |  1.62<H>    Ca    8.6      15 Aug 2021 06:18  Phos  3.2     08-15  Mg     2.3     08-15    TPro  7.1  /  Alb  3.4<L>  /  TBili  0.6  /  DBili  x   /  AST  11  /  ALT  16  /  AlkPhos  145<H>  08-15    Creatinine Trend: 1.62<--, 1.59<--      PHYSICAL EXAM:  T(C): 36.3 (08-16-21 @ 04:35), Max: 36.6 (08-15-21 @ 21:43)  HR: 76 (08-16-21 @ 04:35) (73 - 79)  BP: 166/74 (08-16-21 @ 04:35) (143/57 - 167/96)  RR: 18 (08-16-21 @ 04:35) (18 - 18)  SpO2: 96% (08-16-21 @ 04:35) (95% - 97%)  Wt(kg): --  I&O's Summary      HEENT:  (-)icterus (-)pallor  CV: N S1 S2 1/6 TRINIDAD (+)2 Pulses B/l  Resp:  Clear to ausculatation B/L, normal effort  GI: (+) BS Soft, NT, ND  Lymph:  (-)Edema, (-)obvious lymphadenopathy  Skin: Warm to touch, Normal turgor  Psych: Appropriate mood and affect          ASSESSMENT/PLAN: 	76y  Male ambulates and performs ADL independently, w/ PMH of CAD (s/p CABG >10yrs ago), DM, HTN, and HLD, presents with right foot pain x2 weeks. Cardiology consulted for management of cad.     - Echo noted, CINTIA of 1.0 cm moderate to severe AS, will need SABIHA once acute issues resolve.  HE denies CP, SOB or LOC  - Podiatry f/u  - Abx per ID  - cont ASA, statin BB    Zak Wilkins MD, FACC  BEEPER [phone]

## 2021-08-16 NOTE — PROGRESS NOTE ADULT - ASSESSMENT
Patient is a 76y old  Male who ambulates and performs ADL independently, w/ PMH of CAD (s/p CABG >10yrs ago), DM, HTN, and HLD, now presents to the ER for evaluation of right foot pain x2 weeks.  He states stepping on a dog treat approximately 3 weeks ago, which he notice a development of wound at the time. Right foot pain developed a week later. It was mild initially, but progressive has gotten worsen. Patient has been non-compliant with medications for the past 2 months, because he has been feeling great. ON admission, he found to have No fever but tachycardia. He has started on Unasyn and Vancomycin, and the ID consult requested to assist with further evaluation and antibiotic management.    # Right DFU - wound Cx grew Streptococcus agalactiae (Group B)    would recommend:    1. Continue Unasyn since wound culture grew   2. Need 6 weeks if no plan for debridement   3. Monitor kidney function  4. Wound care as per Podiatry       Attending Attestation:    Spent more than 45 minutes on total encounter, more than 50 % of the visit was spent counseling and/or coordinating care by the Attending physician. Patient is a 76y old  Male who ambulates and performs ADL independently, w/ PMH of CAD (s/p CABG >10yrs ago), DM, HTN, and HLD, now presents to the ER for evaluation of right foot pain x2 weeks.  He states stepping on a dog treat approximately 3 weeks ago, which he notice a development of wound at the time. Right foot pain developed a week later. It was mild initially, but progressive has gotten worsen. Patient has been non-compliant with medications for the past 2 months, because he has been feeling great. ON admission, he found to have No fever but tachycardia. He has started on Unasyn and Vancomycin, and the ID consult requested to assist with further evaluation and antibiotic management.    # Right DFU - wound Cx grew Streptococcus agalactiae (Group B)- Osteomyelitis of plantar aspect of the fifth metatarsal head - on MRI 8/16/21    would recommend:    1. Continue Unasyn since wound culture grew Streptococcus agalactiae  2. Need 6 weeks if No plan for debridement   3. Monitor kidney function  4. Wound care as per Podiatry     Attending Attestation:    Spent more than 45 minutes on total encounter, more than 50 % of the visit was spent counseling and/or coordinating care by the Attending physician.

## 2021-08-16 NOTE — PROGRESS NOTE ADULT - PROBLEM SELECTOR PLAN 3
- h/o CAD (s/p CABG >10yr ago), non-compliant w/ medications  - EKG showed NSTEMI, but no changes from jan2021 EKG  - trop 0.22>0.22  - c/w ASA, statin, metoprolol    TTE with G1dd  - Cardio, Dr. Wilkins, consulted

## 2021-08-16 NOTE — PROGRESS NOTE ADULT - PROBLEM SELECTOR PLAN 1
- p/w right foot ulcer and pain; non-complaint w/ medications  - PE: right foot fifth metatarsal plantar ulcer  - WBC wnl  - foot xray cellulitis  - probe to bone  - s/p vanco, zosyn  - c/w pain management  - ID recommending Unasyn  - f/u MR foot, wound & blood cx - pending    - podiatry onboard  - ID, Dr. Barrett, onboard - p/w right foot ulcer and pain; non-complaint w/ medications  - PE: right foot fifth metatarsal plantar ulcer  - WBC wnl  - foot xray cellulitis  - probe to bone  - s/p vanco, zosyn  - c/w pain management, dilaudid PRN  - ID recommending Unasyn, wound culture growing GBS  - f/u MR foot, wound & blood cx - pending    - podiatry onboard  - ID, Dr. Barrett, onboard

## 2021-08-16 NOTE — PROGRESS NOTE ADULT - SUBJECTIVE AND OBJECTIVE BOX
Patient is a 76y old  Male who presents with a chief complaint of diabetic ulcer (15 Aug 2021 21:43)    PATIENT IS SEEN AND EXAMINED IN MEDICAL FLOOR.  KEENANT [    ]    MADDY [   ]      GT [   ]    ALLERGIES:  No Known Allergies      Daily     Daily     VITALS:    Vital Signs Last 24 Hrs  T(C): 36.3 (16 Aug 2021 04:35), Max: 36.6 (15 Aug 2021 21:43)  T(F): 97.3 (16 Aug 2021 04:35), Max: 97.9 (15 Aug 2021 21:43)  HR: 76 (16 Aug 2021 04:35) (73 - 79)  BP: 166/74 (16 Aug 2021 04:35) (143/57 - 167/96)  BP(mean): --  RR: 18 (16 Aug 2021 04:35) (18 - 18)  SpO2: 96% (16 Aug 2021 04:35) (95% - 97%)    LABS:    CBC Full  -  ( 15 Aug 2021 06:18 )  WBC Count : 7.02 K/uL  RBC Count : 4.16 M/uL  Hemoglobin : 12.2 g/dL  Hematocrit : 36.5 %  Platelet Count - Automated : 215 K/uL  Mean Cell Volume : 87.7 fl  Mean Cell Hemoglobin : 29.3 pg  Mean Cell Hemoglobin Concentration : 33.4 gm/dL  Auto Neutrophil # : 5.08 K/uL  Auto Lymphocyte # : 1.34 K/uL  Auto Monocyte # : 0.42 K/uL  Auto Eosinophil # : 0.11 K/uL  Auto Basophil # : 0.04 K/uL  Auto Neutrophil % : 72.3 %  Auto Lymphocyte % : 19.1 %  Auto Monocyte % : 6.0 %  Auto Eosinophil % : 1.6 %  Auto Basophil % : 0.6 %      08-15    137  |  104  |  25<H>  ----------------------------<  245<H>  4.0   |  24  |  1.62<H>    Ca    8.6      15 Aug 2021 06:18  Phos  3.2     08-15  Mg     2.3     08-15    TPro  7.1  /  Alb  3.4<L>  /  TBili  0.6  /  DBili  x   /  AST  11  /  ALT  16  /  AlkPhos  145<H>  08-15    CAPILLARY BLOOD GLUCOSE      POCT Blood Glucose.: 108 mg/dL (16 Aug 2021 11:44)  POCT Blood Glucose.: 119 mg/dL (16 Aug 2021 08:04)  POCT Blood Glucose.: 229 mg/dL (15 Aug 2021 21:55)  POCT Blood Glucose.: 196 mg/dL (15 Aug 2021 17:01)        LIVER FUNCTIONS - ( 15 Aug 2021 06:18 )  Alb: 3.4 g/dL / Pro: 7.1 g/dL / ALK PHOS: 145 U/L / ALT: 16 U/L DA / AST: 11 U/L / GGT: x           Creatinine Trend: 1.62<--, 1.59<--  I&O's Summary          .Blood Blood-Venous  08-14 @ 21:09   No growth to date.  --  --      .Surgical Swab Right foot plantar wound  08-14 @ 21:08   Moderate Streptococcus agalactiae (Group B) isolated  Group B streptococci are susceptible to ampicillin,  penicillin and cefazolin, but may be resistant to  erythromycin and clindamycin.  Recommendations for intrapartum prophylaxis for Group B  streptococci are penicillin or ampicillin.  Normal skin filiberto isolated  --  --          MEDICATIONS:    MEDICATIONS  (STANDING):  ampicillin/sulbactam  IVPB      ampicillin/sulbactam  IVPB 3 Gram(s) IV Intermittent every 8 hours  aspirin  chewable 81 milliGRAM(s) Oral daily  atorvastatin 40 milliGRAM(s) Oral at bedtime  dextrose 40% Gel 15 Gram(s) Oral once  dextrose 5%. 1000 milliLiter(s) (50 mL/Hr) IV Continuous <Continuous>  dextrose 5%. 1000 milliLiter(s) (100 mL/Hr) IV Continuous <Continuous>  dextrose 50% Injectable 25 Gram(s) IV Push once  dextrose 50% Injectable 12.5 Gram(s) IV Push once  dextrose 50% Injectable 25 Gram(s) IV Push once  glucagon  Injectable 1 milliGRAM(s) IntraMuscular once  heparin   Injectable 5000 Unit(s) SubCutaneous every 8 hours  insulin glargine Injectable (LANTUS) 7 Unit(s) SubCutaneous at bedtime  insulin lispro (ADMELOG) corrective regimen sliding scale   SubCutaneous Before meals and at bedtime  metoprolol succinate ER 50 milliGRAM(s) Oral daily  sodium chloride 0.9%. 1000 milliLiter(s) (75 mL/Hr) IV Continuous <Continuous>      MEDICATIONS  (PRN):  HYDROmorphone  Injectable 0.5 milliGRAM(s) IV Push every 3 hours PRN Moderate Pain (4 - 6)  HYDROmorphone  Injectable 1 milliGRAM(s) IV Push every 3 hours PRN Severe Pain (7 - 10)      REVIEW OF SYSTEMS:                           ALL ROS DONE [ X   ]    CONSTITUTIONAL:  LETHARGIC [   ], FEVER [   ], UNRESPONSIVE [   ]  CVS:  CP  [   ], SOB, [   ], PALPITATIONS [   ], DIZZYNESS [   ]  RS: COUGH [   ], SPUTUM [   ]  GI: ABDOMINAL PAIN [   ], NAUSEA [   ], VOMITINGS [   ], DIARRHEA [   ], CONSTIPATION [   ]  :  DYSURIA [   ], NOCTURIA [   ], INCREASED FREQUENCY [   ], DRIBLING [   ],  SKELETAL: PAINFUL JOINTS [   ], SWOLLEN JOINTS [   ], NECK ACHE [   ], LOW BACK ACHE [   ],  SKIN : ULCERS [   ], RASH [   ], ITCHING [   ]  CNS: HEAD ACHE [   ], DOUBLE VISION [   ], BLURRED VISION [   ], AMS / CONFUSION [   ], SEIZURES [   ], WEAKNESS [   ],TINGLING / NUMBNESS [   ]    PHYSICAL EXAMINATION:  GENERAL APPEARANCE: NO DISTRESS  HEENT:  NO PALLOR, NO  JVD,  NO   NODES, NECK SUPPLE  CVS: S1 +, S2 +,   RS: AEEB,  OCCASIONAL  RALES +,   NO RONCHI  ABD: SOFT, NT, NO, BS +  EXT: NO PE  SKIN: WARM,   RIGHT FOOT IN BANDAGE  SKELETAL:  ROM ACCEPTABLE  CNS:  AAO X 3,   DEFICITS    RADIOLOGY :      EXAM:  XR FOOT COMP MIN 3 VIEWS RT                            PROCEDURE DATE:  08/14/2021          INTERPRETATION:  Diabetic foot ulcer cellulitis.    3 views right foot.    IMPRESSION: No fracture dislocation or focal bone destruction. No unusual periosteal reaction. Joint spaces preserved. Calcaneal enthesopathy. Extensive calcification/ossification in the region of the plantar fascia . Small vessel calcification. Diffuse soft tissue swelling may reflect cellulitis. No soft tissue gas.      ASSESSMENT :     Type 2 diabetes mellitus with foot ulcer    HTN (hypertension)    HLD (hyperlipidemia)    DM (diabetes mellitus)    CAD (coronary artery disease)    Glaucoma, angle-closure    No significant past surgical history    S/P CABG (coronary artery bypass graft)    History of thyroid surgery        PLAN:  HPI:  Patient is a 76yoM, ambulates and performs ADL independently, w/ PMH of CAD (s/p CABG >10yrs ago), DM, HTN, and HLD, presents with right foot pain x2 weeks. He reports 10/10, progressive, intermittent, sharp, non-radiating, right foot pain. He states stepping on a dog treat approximately 3 weeks ago, which he notice a development of wound at the time. Right foot pain developed a week later. It was mild initially, but progressive has gotten worsen. Patient has been non-compliant with medications for the past 2 months, because he has been feeling great. He denies fever, chills, n/v, dizziness, chest pain, sob, abdominal pain, urinary or bowel movement changes.  (14 Aug 2021 13:11)    # DIABETIC FOOT ULCER, CELLULITIS, SUSPECTED OSTEOMYELITIS RIGHT FOOT - NOTED XRAY, F/U MRI  - PER ID, HOLD ABX PENDING DEEP WOUND CX  - F/U WOUND CX [PRE-HERNÁNDEZ GBS], F/U BCX  - ID CONSULT, PODIATRY CONSULT    # ? OREN - S/P IVF, MONITOR CR, AVOID NEPHROTOXIC AGENTS  - PLACED ON IVF, F/U UA   - NEPHROLOGY CONSULT IN A.M. - DR. SOUZA    # DM -  HBA1C - 11, STARTED LANTUS, SSI + FS      # CAD S/P CABG - PLACED ON ASA, STATIN, BB, F/U ECHOCARDIOGRAM  - CARDIOLOGY CONSULT    # HLD - STATIN, F/U LIPID PANEL    # GI AND DVT PPX    Patient is a 76y old  Male who presents with a chief complaint of diabetic ulcer (15 Aug 2021 21:43)    PATIENT IS SEEN AND EXAMINED IN MEDICAL FLOOR.  KEENANT [    ]    MADDY [   ]      GT [   ]    ALLERGIES:  No Known Allergies      Daily     Daily     VITALS:    Vital Signs Last 24 Hrs  T(C): 36.3 (16 Aug 2021 04:35), Max: 36.6 (15 Aug 2021 21:43)  T(F): 97.3 (16 Aug 2021 04:35), Max: 97.9 (15 Aug 2021 21:43)  HR: 76 (16 Aug 2021 04:35) (73 - 79)  BP: 166/74 (16 Aug 2021 04:35) (143/57 - 167/96)  BP(mean): --  RR: 18 (16 Aug 2021 04:35) (18 - 18)  SpO2: 96% (16 Aug 2021 04:35) (95% - 97%)    LABS:    CBC Full  -  ( 15 Aug 2021 06:18 )  WBC Count : 7.02 K/uL  RBC Count : 4.16 M/uL  Hemoglobin : 12.2 g/dL  Hematocrit : 36.5 %  Platelet Count - Automated : 215 K/uL  Mean Cell Volume : 87.7 fl  Mean Cell Hemoglobin : 29.3 pg  Mean Cell Hemoglobin Concentration : 33.4 gm/dL  Auto Neutrophil # : 5.08 K/uL  Auto Lymphocyte # : 1.34 K/uL  Auto Monocyte # : 0.42 K/uL  Auto Eosinophil # : 0.11 K/uL  Auto Basophil # : 0.04 K/uL  Auto Neutrophil % : 72.3 %  Auto Lymphocyte % : 19.1 %  Auto Monocyte % : 6.0 %  Auto Eosinophil % : 1.6 %  Auto Basophil % : 0.6 %      08-15    137  |  104  |  25<H>  ----------------------------<  245<H>  4.0   |  24  |  1.62<H>    Ca    8.6      15 Aug 2021 06:18  Phos  3.2     08-15  Mg     2.3     08-15    TPro  7.1  /  Alb  3.4<L>  /  TBili  0.6  /  DBili  x   /  AST  11  /  ALT  16  /  AlkPhos  145<H>  08-15    CAPILLARY BLOOD GLUCOSE      POCT Blood Glucose.: 108 mg/dL (16 Aug 2021 11:44)  POCT Blood Glucose.: 119 mg/dL (16 Aug 2021 08:04)  POCT Blood Glucose.: 229 mg/dL (15 Aug 2021 21:55)  POCT Blood Glucose.: 196 mg/dL (15 Aug 2021 17:01)        LIVER FUNCTIONS - ( 15 Aug 2021 06:18 )  Alb: 3.4 g/dL / Pro: 7.1 g/dL / ALK PHOS: 145 U/L / ALT: 16 U/L DA / AST: 11 U/L / GGT: x           Creatinine Trend: 1.62<--, 1.59<--  I&O's Summary          .Blood Blood-Venous  08-14 @ 21:09   No growth to date.  --  --      .Surgical Swab Right foot plantar wound  08-14 @ 21:08   Moderate Streptococcus agalactiae (Group B) isolated  Group B streptococci are susceptible to ampicillin,  penicillin and cefazolin, but may be resistant to  erythromycin and clindamycin.  Recommendations for intrapartum prophylaxis for Group B  streptococci are penicillin or ampicillin.  Normal skin filiberto isolated  --  --          MEDICATIONS:    MEDICATIONS  (STANDING):  ampicillin/sulbactam  IVPB      ampicillin/sulbactam  IVPB 3 Gram(s) IV Intermittent every 8 hours  aspirin  chewable 81 milliGRAM(s) Oral daily  atorvastatin 40 milliGRAM(s) Oral at bedtime  dextrose 40% Gel 15 Gram(s) Oral once  dextrose 5%. 1000 milliLiter(s) (50 mL/Hr) IV Continuous <Continuous>  dextrose 5%. 1000 milliLiter(s) (100 mL/Hr) IV Continuous <Continuous>  dextrose 50% Injectable 25 Gram(s) IV Push once  dextrose 50% Injectable 12.5 Gram(s) IV Push once  dextrose 50% Injectable 25 Gram(s) IV Push once  glucagon  Injectable 1 milliGRAM(s) IntraMuscular once  heparin   Injectable 5000 Unit(s) SubCutaneous every 8 hours  insulin glargine Injectable (LANTUS) 7 Unit(s) SubCutaneous at bedtime  insulin lispro (ADMELOG) corrective regimen sliding scale   SubCutaneous Before meals and at bedtime  metoprolol succinate ER 50 milliGRAM(s) Oral daily  sodium chloride 0.9%. 1000 milliLiter(s) (75 mL/Hr) IV Continuous <Continuous>      MEDICATIONS  (PRN):  HYDROmorphone  Injectable 0.5 milliGRAM(s) IV Push every 3 hours PRN Moderate Pain (4 - 6)  HYDROmorphone  Injectable 1 milliGRAM(s) IV Push every 3 hours PRN Severe Pain (7 - 10)      REVIEW OF SYSTEMS:                           ALL ROS DONE [ X   ]    CONSTITUTIONAL:  LETHARGIC [   ], FEVER [   ], UNRESPONSIVE [   ]  CVS:  CP  [   ], SOB, [   ], PALPITATIONS [   ], DIZZYNESS [   ]  RS: COUGH [   ], SPUTUM [   ]  GI: ABDOMINAL PAIN [   ], NAUSEA [   ], VOMITINGS [   ], DIARRHEA [   ], CONSTIPATION [   ]  :  DYSURIA [   ], NOCTURIA [   ], INCREASED FREQUENCY [   ], DRIBLING [   ],  SKELETAL: PAINFUL JOINTS [   ], SWOLLEN JOINTS [   ], NECK ACHE [   ], LOW BACK ACHE [   ],  SKIN : ULCERS [   ], RASH [   ], ITCHING [   ]  CNS: HEAD ACHE [   ], DOUBLE VISION [   ], BLURRED VISION [   ], AMS / CONFUSION [   ], SEIZURES [   ], WEAKNESS [   ],TINGLING / NUMBNESS [   ]    PHYSICAL EXAMINATION:  GENERAL APPEARANCE: NO DISTRESS  HEENT:  NO PALLOR, NO  JVD,  NO   NODES, NECK SUPPLE  CVS: S1 +, S2 +,   RS: AEEB,  OCCASIONAL  RALES +,   NO RONCHI  ABD: SOFT, NT, NO, BS +  EXT: NO PE  SKIN: WARM,   RIGHT FOOT IN BANDAGE  SKELETAL:  ROM ACCEPTABLE  CNS:  AAO X 3,   DEFICITS    RADIOLOGY :      EXAM:  XR FOOT COMP MIN 3 VIEWS RT                            PROCEDURE DATE:  08/14/2021          INTERPRETATION:  Diabetic foot ulcer cellulitis.    3 views right foot.    IMPRESSION: No fracture dislocation or focal bone destruction. No unusual periosteal reaction. Joint spaces preserved. Calcaneal enthesopathy. Extensive calcification/ossification in the region of the plantar fascia . Small vessel calcification. Diffuse soft tissue swelling may reflect cellulitis. No soft tissue gas.      ASSESSMENT :     Type 2 diabetes mellitus with foot ulcer    HTN (hypertension)    HLD (hyperlipidemia)    DM (diabetes mellitus)    CAD (coronary artery disease)    Glaucoma, angle-closure    No significant past surgical history    S/P CABG (coronary artery bypass graft)    History of thyroid surgery        PLAN:  HPI:  Patient is a 76yoM, ambulates and performs ADL independently, w/ PMH of CAD (s/p CABG >10yrs ago), DM, HTN, and HLD, presents with right foot pain x2 weeks. He reports 10/10, progressive, intermittent, sharp, non-radiating, right foot pain. He states stepping on a dog treat approximately 3 weeks ago, which he notice a development of wound at the time. Right foot pain developed a week later. It was mild initially, but progressive has gotten worsen. Patient has been non-compliant with medications for the past 2 months, because he has been feeling great. He denies fever, chills, n/v, dizziness, chest pain, sob, abdominal pain, urinary or bowel movement changes.  (14 Aug 2021 13:11)    # DIABETIC FOOT ULCER, CELLULITIS, SUSPECTED OSTEOMYELITIS RIGHT FOOT - NOTED XRAY, F/U MRI  - PER ID, HOLD ABX PENDING DEEP WOUND CX  - START ON UNASYN  - F/U WOUND CX [PRE-HERNÁNDEZ GBS], F/U BCX  - ID CONSULT, PODIATRY CONSULT    # ? OREN  ON CKD - S/P IVF, MONITOR CR, AVOID NEPHROTOXIC AGENTS  - PLACED ON IVF, F/U UA   - NEPHROLOGY CONSULT IN A.M. - DR. SOUZA    # DM -  HBA1C - 11, STARTED LANTUS, SSI + FS      # CAD S/P CABG - PLACED ON ASA, STATIN, BB, F/U ECHOCARDIOGRAM  - CARDIOLOGY CONSULT    # HLD - STATIN, F/U LIPID PANEL    # GI AND DVT PPX

## 2021-08-17 LAB
24R-OH-CALCIDIOL SERPL-MCNC: 8.5 NG/ML — LOW (ref 30–80)
ANION GAP SERPL CALC-SCNC: 5 MMOL/L — SIGNIFICANT CHANGE UP (ref 5–17)
BUN SERPL-MCNC: 22 MG/DL — HIGH (ref 7–18)
CALCIUM SERPL-MCNC: 8.2 MG/DL — LOW (ref 8.4–10.5)
CALCIUM SERPL-MCNC: 8.6 MG/DL — SIGNIFICANT CHANGE UP (ref 8.4–10.5)
CHLORIDE SERPL-SCNC: 109 MMOL/L — HIGH (ref 96–108)
CO2 SERPL-SCNC: 26 MMOL/L — SIGNIFICANT CHANGE UP (ref 22–31)
CREAT ?TM UR-MCNC: 99 MG/DL — SIGNIFICANT CHANGE UP
CREAT SERPL-MCNC: 1.52 MG/DL — HIGH (ref 0.5–1.3)
FERRITIN SERPL-MCNC: 244 NG/ML — SIGNIFICANT CHANGE UP (ref 30–400)
FOLATE SERPL-MCNC: 11.1 NG/ML — SIGNIFICANT CHANGE UP
GLUCOSE BLDC GLUCOMTR-MCNC: 129 MG/DL — HIGH (ref 70–99)
GLUCOSE BLDC GLUCOMTR-MCNC: 134 MG/DL — HIGH (ref 70–99)
GLUCOSE BLDC GLUCOMTR-MCNC: 138 MG/DL — HIGH (ref 70–99)
GLUCOSE BLDC GLUCOMTR-MCNC: 219 MG/DL — HIGH (ref 70–99)
GLUCOSE SERPL-MCNC: 130 MG/DL — HIGH (ref 70–99)
HCT VFR BLD CALC: 35.8 % — LOW (ref 39–50)
HGB BLD-MCNC: 11.9 G/DL — LOW (ref 13–17)
IRON SATN MFR SERPL: 16 % — LOW (ref 20–55)
IRON SATN MFR SERPL: 30 UG/DL — LOW (ref 65–170)
MAGNESIUM SERPL-MCNC: 2.4 MG/DL — SIGNIFICANT CHANGE UP (ref 1.6–2.6)
MCHC RBC-ENTMCNC: 29.5 PG — SIGNIFICANT CHANGE UP (ref 27–34)
MCHC RBC-ENTMCNC: 33.2 GM/DL — SIGNIFICANT CHANGE UP (ref 32–36)
MCV RBC AUTO: 88.8 FL — SIGNIFICANT CHANGE UP (ref 80–100)
NRBC # BLD: 0 /100 WBCS — SIGNIFICANT CHANGE UP (ref 0–0)
OSMOLALITY UR: 539 MOS/KG — SIGNIFICANT CHANGE UP (ref 50–1200)
PHOSPHATE SERPL-MCNC: 3.6 MG/DL — SIGNIFICANT CHANGE UP (ref 2.5–4.5)
PLATELET # BLD AUTO: 221 K/UL — SIGNIFICANT CHANGE UP (ref 150–400)
POTASSIUM SERPL-MCNC: 4.3 MMOL/L — SIGNIFICANT CHANGE UP (ref 3.5–5.3)
POTASSIUM SERPL-SCNC: 4.3 MMOL/L — SIGNIFICANT CHANGE UP (ref 3.5–5.3)
POTASSIUM UR-SCNC: 29 MMOL/L — SIGNIFICANT CHANGE UP
PROT ?TM UR-MCNC: 189 MG/DL — HIGH (ref 0–12)
PTH-INTACT FLD-MCNC: 61 PG/ML — SIGNIFICANT CHANGE UP (ref 15–65)
RBC # BLD: 4.03 M/UL — LOW (ref 4.2–5.8)
RBC # FLD: 12.9 % — SIGNIFICANT CHANGE UP (ref 10.3–14.5)
SODIUM SERPL-SCNC: 140 MMOL/L — SIGNIFICANT CHANGE UP (ref 135–145)
SODIUM UR-SCNC: 75 MMOL/L — SIGNIFICANT CHANGE UP
TIBC SERPL-MCNC: 194 UG/DL — LOW (ref 250–450)
UIBC SERPL-MCNC: 164 UG/DL — SIGNIFICANT CHANGE UP (ref 110–370)
UUN UR-MCNC: 695 MG/DL — SIGNIFICANT CHANGE UP
VIT B12 SERPL-MCNC: 194 PG/ML — LOW (ref 232–1245)
WBC # BLD: 8.1 K/UL — SIGNIFICANT CHANGE UP (ref 3.8–10.5)
WBC # FLD AUTO: 8.1 K/UL — SIGNIFICANT CHANGE UP (ref 3.8–10.5)

## 2021-08-17 RX ORDER — FERROUS SULFATE 325(65) MG
325 TABLET ORAL DAILY
Refills: 0 | Status: DISCONTINUED | OUTPATIENT
Start: 2021-08-17 | End: 2021-08-19

## 2021-08-17 RX ORDER — ERGOCALCIFEROL 1.25 MG/1
50000 CAPSULE ORAL
Refills: 0 | Status: DISCONTINUED | OUTPATIENT
Start: 2021-08-17 | End: 2021-08-19

## 2021-08-17 RX ORDER — AMLODIPINE BESYLATE 2.5 MG/1
10 TABLET ORAL DAILY
Refills: 0 | Status: DISCONTINUED | OUTPATIENT
Start: 2021-08-17 | End: 2021-08-18

## 2021-08-17 RX ORDER — PREGABALIN 225 MG/1
1000 CAPSULE ORAL DAILY
Refills: 0 | Status: DISCONTINUED | OUTPATIENT
Start: 2021-08-17 | End: 2021-08-19

## 2021-08-17 RX ORDER — METOPROLOL TARTRATE 50 MG
50 TABLET ORAL ONCE
Refills: 0 | Status: COMPLETED | OUTPATIENT
Start: 2021-08-17 | End: 2021-08-17

## 2021-08-17 RX ORDER — METOPROLOL TARTRATE 50 MG
100 TABLET ORAL DAILY
Refills: 0 | Status: DISCONTINUED | OUTPATIENT
Start: 2021-08-17 | End: 2021-08-19

## 2021-08-17 RX ADMIN — Medication 50 MILLIGRAM(S): at 12:35

## 2021-08-17 RX ADMIN — OXYCODONE AND ACETAMINOPHEN 1 TABLET(S): 5; 325 TABLET ORAL at 14:00

## 2021-08-17 RX ADMIN — ATORVASTATIN CALCIUM 40 MILLIGRAM(S): 80 TABLET, FILM COATED ORAL at 21:37

## 2021-08-17 RX ADMIN — AMLODIPINE BESYLATE 10 MILLIGRAM(S): 2.5 TABLET ORAL at 06:07

## 2021-08-17 RX ADMIN — OXYCODONE AND ACETAMINOPHEN 1 TABLET(S): 5; 325 TABLET ORAL at 22:35

## 2021-08-17 RX ADMIN — AMPICILLIN SODIUM AND SULBACTAM SODIUM 200 GRAM(S): 250; 125 INJECTION, POWDER, FOR SUSPENSION INTRAMUSCULAR; INTRAVENOUS at 06:07

## 2021-08-17 RX ADMIN — OXYCODONE AND ACETAMINOPHEN 1 TABLET(S): 5; 325 TABLET ORAL at 21:35

## 2021-08-17 RX ADMIN — OXYCODONE AND ACETAMINOPHEN 1 TABLET(S): 5; 325 TABLET ORAL at 00:29

## 2021-08-17 RX ADMIN — AMPICILLIN SODIUM AND SULBACTAM SODIUM 200 GRAM(S): 250; 125 INJECTION, POWDER, FOR SUSPENSION INTRAMUSCULAR; INTRAVENOUS at 13:00

## 2021-08-17 RX ADMIN — HEPARIN SODIUM 5000 UNIT(S): 5000 INJECTION INTRAVENOUS; SUBCUTANEOUS at 06:06

## 2021-08-17 RX ADMIN — Medication 50 MILLIGRAM(S): at 06:07

## 2021-08-17 RX ADMIN — HYDROMORPHONE HYDROCHLORIDE 0.5 MILLIGRAM(S): 2 INJECTION INTRAMUSCULAR; INTRAVENOUS; SUBCUTANEOUS at 05:09

## 2021-08-17 RX ADMIN — ERGOCALCIFEROL 50000 UNIT(S): 1.25 CAPSULE ORAL at 18:06

## 2021-08-17 RX ADMIN — Medication 4: at 21:28

## 2021-08-17 RX ADMIN — HEPARIN SODIUM 5000 UNIT(S): 5000 INJECTION INTRAVENOUS; SUBCUTANEOUS at 13:00

## 2021-08-17 RX ADMIN — INSULIN GLARGINE 7 UNIT(S): 100 INJECTION, SOLUTION SUBCUTANEOUS at 21:29

## 2021-08-17 RX ADMIN — Medication 81 MILLIGRAM(S): at 12:36

## 2021-08-17 RX ADMIN — OXYCODONE AND ACETAMINOPHEN 1 TABLET(S): 5; 325 TABLET ORAL at 13:00

## 2021-08-17 RX ADMIN — OXYCODONE AND ACETAMINOPHEN 1 TABLET(S): 5; 325 TABLET ORAL at 06:07

## 2021-08-17 RX ADMIN — HEPARIN SODIUM 5000 UNIT(S): 5000 INJECTION INTRAVENOUS; SUBCUTANEOUS at 21:26

## 2021-08-17 RX ADMIN — AMPICILLIN SODIUM AND SULBACTAM SODIUM 200 GRAM(S): 250; 125 INJECTION, POWDER, FOR SUSPENSION INTRAMUSCULAR; INTRAVENOUS at 21:28

## 2021-08-17 NOTE — PROGRESS NOTE ADULT - SUBJECTIVE AND OBJECTIVE BOX
NEPHROLOGY MEDICAL CARE, [facility] - Dr. Tariq Herrera/ Dr. Lauren oLpez/ Dr. Dre Call/ Dr. Destiny Knott    Patient was seen and examined at bedside.    CC: patient is okay; NAD    Vital Signs Last 24 Hrs  T(C): 37.2 (17 Aug 2021 05:11), Max: 37.2 (17 Aug 2021 05:11)  T(F): 98.9 (17 Aug 2021 05:11), Max: 98.9 (17 Aug 2021 05:11)  HR: 76 (17 Aug 2021 09:16) (76 - 83)  BP: 164/74 (17 Aug 2021 09:16) (162/67 - 190/70)  BP(mean): --  RR: 18 (17 Aug 2021 05:11) (18 - 18)  SpO2: 99% (17 Aug 2021 05:11) (95% - 99%)    08-16 @ 07:01  -  08-17 @ 07:00  --------------------------------------------------------  IN: 240 mL / OUT: 0 mL / NET: 240 mL        PHYSICAL EXAM:  General: No acute respiratory distress.  Eyes: conjunctiva and sclera clear  ENMT: Atraumatic, Normocephalic, supple, No JVD present. Moist mucous membranes  Respiratory: Clear to percussion bilaterally; No rales, rhonchi, wheezing  Cardiovasular: S1S2+; no r/g; systolic murmur  Gastrointestinal: Soft, Non-tender, Nondistended; Bowel sounds present  Neuro:  Awake, Alert & Oriented X3  Ext:  No edema, No Cyanosis; Rt foot bandage.  Skin: No visible rashes      MEDICATIONS:  MEDICATIONS  (STANDING):  amLODIPine   Tablet 10 milliGRAM(s) Oral daily  ampicillin/sulbactam  IVPB      ampicillin/sulbactam  IVPB 3 Gram(s) IV Intermittent every 8 hours  aspirin  chewable 81 milliGRAM(s) Oral daily  atorvastatin 40 milliGRAM(s) Oral at bedtime  dextrose 40% Gel 15 Gram(s) Oral once  dextrose 5%. 1000 milliLiter(s) (50 mL/Hr) IV Continuous <Continuous>  dextrose 5%. 1000 milliLiter(s) (100 mL/Hr) IV Continuous <Continuous>  dextrose 50% Injectable 25 Gram(s) IV Push once  dextrose 50% Injectable 12.5 Gram(s) IV Push once  dextrose 50% Injectable 25 Gram(s) IV Push once  glucagon  Injectable 1 milliGRAM(s) IntraMuscular once  heparin   Injectable 5000 Unit(s) SubCutaneous every 8 hours  insulin glargine Injectable (LANTUS) 7 Unit(s) SubCutaneous at bedtime  insulin lispro (ADMELOG) corrective regimen sliding scale   SubCutaneous Before meals and at bedtime  metoprolol succinate  milliGRAM(s) Oral daily  oxycodone    5 mG/acetaminophen 325 mG 1 Tablet(s) Oral every 8 hours  sodium chloride 0.9%. 1000 milliLiter(s) (75 mL/Hr) IV Continuous <Continuous>    MEDICATIONS  (PRN):          LABS:                        11.9   8.10  )-----------( 221      ( 17 Aug 2021 06:27 )             35.8         140  |  109<H>  |  22<H>  ----------------------------<  130<H>  4.3   |  26  |  1.52<H>    Ca    8.6      17 Aug 2021 06:27  Phos  3.6       Mg     2.4             Urinalysis Basic - ( 16 Aug 2021 00:18 )    Color: Yellow / Appearance: Clear / S.020 / pH: x  Gluc: x / Ketone: Negative  / Bili: Negative / Urobili: Negative   Blood: x / Protein: 100 / Nitrite: Negative   Leuk Esterase: Trace / RBC: 5-10 /HPF / WBC 3-5 /HPF   Sq Epi: x / Non Sq Epi: Few /HPF / Bacteria: Moderate /HPF      Intact PTH: 61 pg/mL ( @ 12:05)  Vitamin D, 25-Hydroxy: 8.5 ng/mL ( @ 12:05)  Magnesium, Serum: 2.4 mg/dL ( @ 06:27)  Phosphorus Level, Serum: 3.6 mg/dL ( @ 06:27)    Urine studies  Urea Nitrogen,  Random Urine: 695 mg/dL ( @ 09:12)  Potassium, Random Urine: 29 mmol/L ( @ 06:19)  Osmolality, Random Urine: 539 mos/kg ( @ :19)  Sodium, Random Urine: 75 mmol/L ( @ :19)  Creatinine, Random Urine: 99 mg/dL ( @ :19)    PTH and Vit D:  Intact PTH: 61 pg/mL ( @ 12:05)  Vitamin D, 25-Hydroxy: 8.5 ng/mL ( @ 12:05)

## 2021-08-17 NOTE — DIETITIAN INITIAL EVALUATION ADULT. - PROBLEM SELECTOR PLAN 4
- h/o HTN, non-compliant w/ medications; previously on metoprolol 200mg qd, [id number] qd, and isosorbide 30mg qd  - c/w metoprolol 50mg for now  - resume other home meds as needed plan per MD

## 2021-08-17 NOTE — DIETITIAN INITIAL EVALUATION ADULT. - PERTINENT MEDS FT
MEDICATIONS  (STANDING):  amLODIPine   Tablet 10 milliGRAM(s) Oral daily  ampicillin/sulbactam  IVPB      ampicillin/sulbactam  IVPB 3 Gram(s) IV Intermittent every 8 hours  aspirin  chewable 81 milliGRAM(s) Oral daily  atorvastatin 40 milliGRAM(s) Oral at bedtime  dextrose 40% Gel 15 Gram(s) Oral once  dextrose 5%. 1000 milliLiter(s) (50 mL/Hr) IV Continuous <Continuous>  dextrose 5%. 1000 milliLiter(s) (100 mL/Hr) IV Continuous <Continuous>  dextrose 50% Injectable 25 Gram(s) IV Push once  dextrose 50% Injectable 12.5 Gram(s) IV Push once  dextrose 50% Injectable 25 Gram(s) IV Push once  glucagon  Injectable 1 milliGRAM(s) IntraMuscular once  heparin   Injectable 5000 Unit(s) SubCutaneous every 8 hours  insulin glargine Injectable (LANTUS) 7 Unit(s) SubCutaneous at bedtime  insulin lispro (ADMELOG) corrective regimen sliding scale   SubCutaneous Before meals and at bedtime  metoprolol succinate  milliGRAM(s) Oral daily  metoprolol succinate ER 50 milliGRAM(s) Oral once  oxycodone    5 mG/acetaminophen 325 mG 1 Tablet(s) Oral every 8 hours  sodium chloride 0.9%. 1000 milliLiter(s) (75 mL/Hr) IV Continuous <Continuous>

## 2021-08-17 NOTE — PROGRESS NOTE ADULT - SUBJECTIVE AND OBJECTIVE BOX
PGY-1 Progress Note discussed with attending    CHIEF COMPLAINT & BRIEF HOSPITAL COURSE:  Patient is a 76yoM, ambulates and performs ADL independently, w/ PMH of CAD (s/p CABG >10yrs ago), DM, HTN, and HLD, presents with right foot pain x2 weeks. Patient is admitted for diabetic foot ulcer.    INTERVAL HPI/OVERNIGHT EVENTS:   Patient reports pain of R foot, controlled. No overnight events noted.     REVIEW OF SYSTEMS:  CONSTITUTIONAL: No fever, weight loss, or fatigue  RESPIRATORY: No cough, wheezing, chills or hemoptysis; No shortness of breath  CARDIOVASCULAR: No chest pain, palpitations, dizziness, or leg swelling  GASTROINTESTINAL: No abdominal pain. No nausea, vomiting, or hematemesis; No diarrhea or constipation. No melena or hematochezia.  GENITOURINARY: No dysuria or hematuria, urinary frequency  NEUROLOGICAL: No headaches, memory loss, loss of strength, numbness, or tremors  SKIN: No itching, burning, rashes, or lesions     MEDICATIONS  (STANDING):  amLODIPine   Tablet 10 milliGRAM(s) Oral daily  ampicillin/sulbactam  IVPB      ampicillin/sulbactam  IVPB 3 Gram(s) IV Intermittent every 8 hours  aspirin  chewable 81 milliGRAM(s) Oral daily  atorvastatin 40 milliGRAM(s) Oral at bedtime  cyanocobalamin 1000 MICROGram(s) Oral daily  dextrose 40% Gel 15 Gram(s) Oral once  dextrose 5%. 1000 milliLiter(s) (50 mL/Hr) IV Continuous <Continuous>  dextrose 5%. 1000 milliLiter(s) (100 mL/Hr) IV Continuous <Continuous>  dextrose 50% Injectable 25 Gram(s) IV Push once  dextrose 50% Injectable 12.5 Gram(s) IV Push once  dextrose 50% Injectable 25 Gram(s) IV Push once  ergocalciferol 37914 Unit(s) Oral <User Schedule>  ferrous    sulfate 325 milliGRAM(s) Oral daily  glucagon  Injectable 1 milliGRAM(s) IntraMuscular once  heparin   Injectable 5000 Unit(s) SubCutaneous every 8 hours  insulin glargine Injectable (LANTUS) 7 Unit(s) SubCutaneous at bedtime  insulin lispro (ADMELOG) corrective regimen sliding scale   SubCutaneous Before meals and at bedtime  metoprolol succinate  milliGRAM(s) Oral daily  oxycodone    5 mG/acetaminophen 325 mG 1 Tablet(s) Oral every 8 hours  sodium chloride 0.9%. 1000 milliLiter(s) (75 mL/Hr) IV Continuous <Continuous>    MEDICATIONS  (PRN):      Vital Signs Last 24 Hrs  T(C): 36.7 (17 Aug 2021 14:21), Max: 37.2 (17 Aug 2021 05:11)  T(F): 98 (17 Aug 2021 14:21), Max: 98.9 (17 Aug 2021 05:11)  HR: 71 (17 Aug 2021 14:21) (71 - 83)  BP: 136/69 (17 Aug 2021 14:21) (136/69 - 190/70)  BP(mean): --  RR: 17 (17 Aug 2021 14:21) (17 - 18)  SpO2: 98% (17 Aug 2021 14:21) (95% - 99%)    PHYSICAL EXAMINATION:  GENERAL: NAD, well built  HEAD:  Atraumatic, Normocephalic  EYES:  conjunctiva and sclera clear  NECK: Supple, No JVD, Normal thyroid  CHEST/LUNG: Clear to auscultation. Clear to percussion bilaterally; No rales, rhonchi, wheezing, or rubs  HEART: Regular rate and rhythm; No murmurs, rubs, or gallops  ABDOMEN: Soft, Nontender, Nondistended; Bowel sounds present, no pain or masses on palpation  NERVOUS SYSTEM:  Alert & Oriented X3  : voiding well  EXTREMITIES:  2+ Peripheral Pulses, No clubbing, cyanosis, or edema  SKIN: warm dry                          11.9   8.10  )-----------( 221      ( 17 Aug 2021 06:27 )             35.8     08-17    140  |  109<H>  |  22<H>  ----------------------------<  130<H>  4.3   |  26  |  1.52<H>    Ca    8.6      17 Aug 2021 06:27  Phos  3.6     08-17  Mg     2.4     08-17                I&O's Summary    16 Aug 2021 07:01  -  17 Aug 2021 07:00  --------------------------------------------------------  IN: 240 mL / OUT: 0 mL / NET: 240 mL    17 Aug 2021 07:01  -  17 Aug 2021 15:55  --------------------------------------------------------  IN: 0 mL / OUT: 500 mL / NET: -500 mL          Culture - Blood (collected 14 Aug 2021 21:09)  Source: .Blood Blood-Venous  Preliminary Report (15 Aug 2021 22:02):    No growth to date.    Culture - Blood (collected 14 Aug 2021 21:09)  Source: .Blood Blood-Venous  Preliminary Report (15 Aug 2021 22:02):    No growth to date.    Culture - Surgical Swab (collected 14 Aug 2021 21:08)  Source: .Surgical Swab Right foot plantar wound  Final Report (16 Aug 2021 21:31):    Moderate Streptococcus agalactiae (Group B) isolated    Group B streptococci are susceptible to ampicillin,    penicillin and cefazolin, but may be resistant to    erythromycin and clindamycin.    Recommendations for intrapartum prophylaxis for Group B    streptococci are penicillin or ampicillin.    Moderate Bacteroides fragilis "Susceptibilities not performed"    Normal skin filiberto isolated        CAPILLARY BLOOD GLUCOSE      RADIOLOGY & ADDITIONAL TESTS:

## 2021-08-17 NOTE — DIETITIAN INITIAL EVALUATION ADULT. - OTHER INFO
Pt lives home with family PTA, alert, verbally responsive, able to communicate well, but poor historian, also spoke to family ( daughter) at [phone]. Pt lives home with family PTA, alert, verbally responsive, able to communicate well, but poor historian, also spoke to family ( daughter) at [phone]; reported appetite good, unclear recent wt changes, denied GI distress, chewing or swallowing problem at present, food choices obtained and forwarded to Dietary; h/o DM, [id number]=56.4, non-compliant to medication, not taking x 2m. nutrition information on basic Diabetic Meal Planning with Healthy Plate, avoid simple sugars given, pt/family receptive, written copy given with RD business card attached for contact as needed

## 2021-08-17 NOTE — CONSULT NOTE ADULT - SUBJECTIVE AND OBJECTIVE BOX
Service:  Consultation Note    Patient is a 76y old  Male who presents with a chief complaint of diabetic ulcer (17 Aug 2021 16:18)      HPI:  Patient is a 76yoM, ambulates and performs ADL independently, w/ PMH of CAD (s/p CABG >10yrs ago), DM, HTN, and HLD, presents with right foot pain x2 weeks. He reports 10/10, progressive, intermittent, sharp, non-radiating, right foot pain. He states stepping on a dog treat approximately 3 weeks ago, which he notice a development of wound at the time. Right foot pain developed a week later. It was mild initially, but progressive has gotten worsen. Patient has been non-compliant with medications for the past 2 months, because he has been feeling great. He denies fever, chills, n/v, dizziness, chest pain, sob, abdominal pain, urinary or bowel movement changes.  (14 Aug 2021 13:11)      PAST MEDICAL & SURGICAL HISTORY:  HTN (hypertension)    HLD (hyperlipidemia)    DM (diabetes mellitus)    CAD (coronary artery disease)    Glaucoma, angle-closure    S/P CABG (coronary artery bypass graft)    History of thyroid surgery        Allergies    No Known Allergies    Intolerances        MEDICATIONS  (STANDING):  amLODIPine   Tablet 10 milliGRAM(s) Oral daily  ampicillin/sulbactam  IVPB      ampicillin/sulbactam  IVPB 3 Gram(s) IV Intermittent every 8 hours  aspirin  chewable 81 milliGRAM(s) Oral daily  atorvastatin 40 milliGRAM(s) Oral at bedtime  cyanocobalamin 1000 MICROGram(s) Oral daily  dextrose 40% Gel 15 Gram(s) Oral once  dextrose 5%. 1000 milliLiter(s) (50 mL/Hr) IV Continuous <Continuous>  dextrose 5%. 1000 milliLiter(s) (100 mL/Hr) IV Continuous <Continuous>  dextrose 50% Injectable 25 Gram(s) IV Push once  dextrose 50% Injectable 12.5 Gram(s) IV Push once  dextrose 50% Injectable 25 Gram(s) IV Push once  ergocalciferol 78330 Unit(s) Oral <User Schedule>  ferrous    sulfate 325 milliGRAM(s) Oral daily  glucagon  Injectable 1 milliGRAM(s) IntraMuscular once  heparin   Injectable 5000 Unit(s) SubCutaneous every 8 hours  insulin glargine Injectable (LANTUS) 7 Unit(s) SubCutaneous at bedtime  insulin lispro (ADMELOG) corrective regimen sliding scale   SubCutaneous Before meals and at bedtime  metoprolol succinate  milliGRAM(s) Oral daily  oxycodone    5 mG/acetaminophen 325 mG 1 Tablet(s) Oral every 8 hours  sodium chloride 0.9%. 1000 milliLiter(s) (75 mL/Hr) IV Continuous <Continuous>    MEDICATIONS  (PRN):      Vital Signs Last 24 Hrs  T(C): 36.7 (17 Aug 2021 14:21), Max: 37.2 (17 Aug 2021 05:11)  T(F): 98 (17 Aug 2021 14:21), Max: 98.9 (17 Aug 2021 05:11)  HR: 71 (17 Aug 2021 14:) (71 - 83)  BP: 136/69 (17 Aug 2021 14:) (136/69 - 190/70)  BP(mean): --  RR: 17 (17 Aug 2021 14:21) (17 - 18)  SpO2: 98% (17 Aug 2021 14:) (95% - 99%)    Physical Exam:  Gen:  HEENT:  Abd:  Back:  Pelvic:  Ext:    Labs:                          11.9   8.10  )-----------( 221      ( 17 Aug 2021 06:27 )             35.8     08-17    140  |  109<H>  |  22<H>  ----------------------------<  130<H>  4.3   |  26  |  1.52<H>    Ca    8.6      17 Aug 2021 06:27  Phos  3.6     08-17  Mg     2.4     08-17        Urinalysis Basic - ( 16 Aug 2021 00:18 )    Color: Yellow / Appearance: Clear / S.020 / pH: x  Gluc: x / Ketone: Negative  / Bili: Negative / Urobili: Negative   Blood: x / Protein: 100 / Nitrite: Negative   Leuk Esterase: Trace / RBC: 5-10 /HPF / WBC 3-5 /HPF   Sq Epi: x / Non Sq Epi: Few /HPF / Bacteria: Moderate /HPF        Cultures:    Radiological Exams:   Vascular Surgery Consultation Note    Patient is a 76y old  Male who presents with a chief complaint of diabetic ulcer (17 Aug 2021 16:18)      HPI:  Patient is a 76yoM, ambulates and performs ADL independently, w/ PMH of CAD (s/p CABG >10yrs ago), DM, HTN, and HLD, presents with right foot pain x2 weeks. He reports 10/10, progressive, intermittent, sharp, non-radiating, right foot pain. He states stepping on a dog treat approximately 3 weeks ago, which he notice a development of wound at the time. Right foot pain developed a week later. It was mild initially, but progressive has gotten worsen. Patient has been non-compliant with medications for the past 2 months, because he has been feeling great. He denies fever, chills, n/v, dizziness, chest pain, sob, abdominal pain, urinary or bowel movement changes.     INTERVAL HPI:  Called to evaluate this 75yo M with PMH CAD s/p CABG over 10 years ago, DM, HTN with diabetic foot ulcer x 3 weeks.  Pt states he's been having pain in right foot since stepping on a dog treat 3 weeks ago.  Pt has been followed by an outside podiatrist.  Pt was referred to ED for pain and swelling.  Pt denies smoking and no previous surgeries on foot. Denies calf pain, pain in lower extremities at rest.        PAST MEDICAL & SURGICAL HISTORY:  HTN (hypertension)    HLD (hyperlipidemia)    DM (diabetes mellitus)    CAD (coronary artery disease)    Glaucoma, angle-closure    S/P CABG (coronary artery bypass graft)    History of thyroid surgery        Allergies    No Known Allergies    Intolerances        MEDICATIONS  (STANDING):  amLODIPine   Tablet 10 milliGRAM(s) Oral daily  ampicillin/sulbactam  IVPB      ampicillin/sulbactam  IVPB 3 Gram(s) IV Intermittent every 8 hours  aspirin  chewable 81 milliGRAM(s) Oral daily  atorvastatin 40 milliGRAM(s) Oral at bedtime  cyanocobalamin 1000 MICROGram(s) Oral daily  dextrose 40% Gel 15 Gram(s) Oral once  dextrose 5%. 1000 milliLiter(s) (50 mL/Hr) IV Continuous <Continuous>  dextrose 5%. 1000 milliLiter(s) (100 mL/Hr) IV Continuous <Continuous>  dextrose 50% Injectable 25 Gram(s) IV Push once  dextrose 50% Injectable 12.5 Gram(s) IV Push once  dextrose 50% Injectable 25 Gram(s) IV Push once  ergocalciferol 43348 Unit(s) Oral <User Schedule>  ferrous    sulfate 325 milliGRAM(s) Oral daily  glucagon  Injectable 1 milliGRAM(s) IntraMuscular once  heparin   Injectable 5000 Unit(s) SubCutaneous every 8 hours  insulin glargine Injectable (LANTUS) 7 Unit(s) SubCutaneous at bedtime  insulin lispro (ADMELOG) corrective regimen sliding scale   SubCutaneous Before meals and at bedtime  metoprolol succinate  milliGRAM(s) Oral daily  oxycodone    5 mG/acetaminophen 325 mG 1 Tablet(s) Oral every 8 hours  sodium chloride 0.9%. 1000 milliLiter(s) (75 mL/Hr) IV Continuous <Continuous>    MEDICATIONS  (PRN):      Vital Signs Last 24 Hrs  T(C): 36.7 (17 Aug 2021 14:21), Max: 37.2 (17 Aug 2021 05:11)  T(F): 98 (17 Aug 2021 14:21), Max: 98.9 (17 Aug 2021 05:11)  HR: 71 (17 Aug 2021 14:21) (71 - 83)  BP: 136/69 (17 Aug 2021 14:21) (136/69 - 190/70)  BP(mean): --  RR: 17 (17 Aug 2021 14:21) (17 - 18)  SpO2: 98% (17 Aug 2021 14:21) (95% - 99%)    Physical Exam:  Gen: awake, alert oriented NAD  Abd: obese, soft NT ND  Ext: warm to touch,  Right 5th digit with 1x1 circular ulceration, foul smelling, dry/no drainage.  Lateral aspect of 5th digit with area of necrotic skin, no bogginess, purulence or tenderness. No other lesions seen bilaterally  Vasc: good capillary refill bilaterally.  Right DP/PT slightly palpable.  Left DP/PT palpable. b/l popliteal/femoral pulses palpable.     Labs:                          11.9   8.10  )-----------( 221      ( 17 Aug 2021 06:27 )             35.8     08-    140  |  109<H>  |  22<H>  ----------------------------<  130<H>  4.3   |  26  |  1.52<H>    Ca    8.6      17 Aug 2021 06:27  Phos  3.6       Mg     2.4             Urinalysis Basic - ( 16 Aug 2021 00:18 )    Color: Yellow / Appearance: Clear / S.020 / pH: x  Gluc: x / Ketone: Negative  / Bili: Negative / Urobili: Negative   Blood: x / Protein: 100 / Nitrite: Negative   Leuk Esterase: Trace / RBC: 5-10 /HPF / WBC 3-5 /HPF   Sq Epi: x / Non Sq Epi: Few /HPF / Bacteria: Moderate /HPF        Cultures:  Culture - Surgical Swab (21 @ 21:08)   Specimen Source: .Surgical Swab Right foot plantar wound   Culture Results: Moderate Streptococcus agalactiae (Group B) isolated   Blood culture x 2 negative  Radiological Exams:  < from: Xray Foot AP + Lateral + Oblique, Right (21 @ 09:51) >  IMPRESSION: No fracture dislocation or focal bone destruction. No unusual periosteal reaction. Joint spaces preserved. Calcaneal enthesopathy. Extensive calcification/ossification in the region of the plantar fascia . Small vessel calcification. Diffuse soft tissue swelling may reflect cellulitis. No soft tissue gas.    < end of copied text >  < from: [facility] Physiol Extremity Lower 3+ Level, BI (21 @ 16:19) >  IMPRESSION: ABIs compatible with mild peripheral vascular disease.      < end of copied text >

## 2021-08-17 NOTE — DIETITIAN INITIAL EVALUATION ADULT. - PERTINENT LABORATORY DATA
08-17 Na140 mmol/L Glu 130 mg/dL<H> K+ 4.3 mmol/L Cr  1.52 mg/dL<H> BUN 22 mg/dL<H>   08-17 Phos 3.6 mg/dL   08-15 Alb 3.4 g/dL<L>       08-15 Chol 155 mg/dL LDL --    HDL 35 mg/dL<L> Trig 131 mg/dL  08-15-21 @ 11:42 HgbA1C 11.4 [4.0 - 5.6]

## 2021-08-17 NOTE — CONSULT NOTE ADULT - ASSESSMENT
R 5th digit plantar diabetic ulcer. ABIs/PVRs reviewed with Attending  1. Recommend toe pressures  2. Ok to proceed with podiatric intervention  3. Will follow

## 2021-08-17 NOTE — PROGRESS NOTE ADULT - PROBLEM SELECTOR PLAN 3
- h/o CAD (s/p CABG >10yr ago), non-compliant w/ medications  - EKG showed NSTEMI, but no changes from jan2021 EKG  - trop 0.22>0.22  - c/w ASA, statin, metoprolol    TTE with G1dd, severe AS  - Cardio, Dr. Wilkins, consulted and recommending SABIHA

## 2021-08-17 NOTE — PROGRESS NOTE ADULT - ASSESSMENT
1. OREN due to pre-renal azotemia due to volume depletion.  -Scr is about the same and caution with fluids since h/o Severe AS  -urinalysis shows proteinuria; uosm and FeNa is 0.76% and spot protein to creatinine ratio is 1.9gm  - renal sonogram shows no hydronephrosis with Right kidney 12.3cm and Left kidney 11.1cm  -Adjust meds to eGFR and avoid IV Gadolinium contrast,NSAIDs, and phosphate enema.  -Monitor I/O's daily.   -Monitor SMA daily.  2. CKD stage 3 most likely due to diabetic nephropathy and hypertensive nephrosclerosis with proteinuria  -patient has mild component of ckd. presently could be his baseline.  -Keep patient euvolemic and renal diet  -Avoid Nephrotoxic Meds/ Agents such as (NSAIDs, IV contrast, Aminoglycosides such as gentamicin, -Gadolinium contrast, Phosphate containing enemas, etc..)  -Adjust Medications according to eGFR  3. HTN: -bp is elevated. norvasc started. continue bp meds  -titrate bp meds to keep sbp >110 and < 130  4. Mineral Bone Disease:  -phos is okay, Vitamin 25 OH is low and add ergo 50k weekly, and PTH intact around 60  5. Anemia rule out iron deficiency vs anemia of chronic disease vs anemia of ckd:  -iron panel noted and low iron sat, add ferrous tab daily,  folate is okay and  vitamin B12 is low and add vitamin b12 tabs.  -F/u CBC daily  -transfuse if HB < 7.0.  6. Rt foot ulcer: on unasyn and Plan as per podiatry; MRI of foot done and 5th head resection possible on thursday.   1. OREN due to pre-renal azotemia due to volume depletion.  -Scr is about the same and caution with fluids since h/o Severe AS  -urinalysis shows proteinuria; uosm and FeNa is 0.76% and spot protein to creatinine ratio is 1.9gm  - renal sonogram shows no hydronephrosis with Right kidney 12.3cm and Left kidney 11.1cm  -Adjust meds to eGFR and avoid IV Gadolinium contrast,NSAIDs, and phosphate enema.  -Monitor I/O's daily.   -Monitor SMA daily.  2. CKD stage 3 most likely due to diabetic nephropathy and hypertensive nephrosclerosis with proteinuria  -patient has mild component of ckd. presently could be his baseline. would benefit with ACE-I/ARB when renal functions is stable.  -Keep patient euvolemic and renal diet  -Avoid Nephrotoxic Meds/ Agents such as (NSAIDs, IV contrast, Aminoglycosides such as gentamicin, -Gadolinium contrast, Phosphate containing enemas, etc..)  -Adjust Medications according to eGFR  3. HTN: -bp is elevated. norvasc started. continue bp meds  -titrate bp meds to keep sbp >110 and < 130  4. Mineral Bone Disease:  -phos is okay, Vitamin 25 OH is low and add ergo 50k weekly, and PTH intact around 60  5. Anemia rule out iron deficiency vs anemia of chronic disease vs anemia of ckd:  -iron panel noted and low iron sat, add ferrous tab daily,  folate is okay and  vitamin B12 is low and add vitamin b12 tabs.  -F/u CBC daily  -transfuse if HB < 7.0.  6. Rt foot ulcer: on unasyn and Plan as per podiatry; MRI of foot done and 5th head resection possible on thursday.

## 2021-08-17 NOTE — PROGRESS NOTE ADULT - SUBJECTIVE AND OBJECTIVE BOX
`Patient is a 76y old  Male who presents with a chief complaint of diabetic ulcer (17 Aug 2021 14:20)    PATIENT IS SEEN AND EXAMINED IN MEDICAL FLOOR.  KEENANT [    ]    MADDY [   ]      GT [   ]    ALLERGIES:  No Known Allergies      Daily     Daily     VITALS:    Vital Signs Last 24 Hrs  T(C): 36.7 (17 Aug 2021 14:21), Max: 37.2 (17 Aug 2021 05:11)  T(F): 98 (17 Aug 2021 14:21), Max: 98.9 (17 Aug 2021 05:11)  HR: 71 (17 Aug 2021 14:21) (71 - 83)  BP: 136/69 (17 Aug 2021 14:21) (136/69 - 190/70)  BP(mean): --  RR: 17 (17 Aug 2021 14:21) (17 - 18)  SpO2: 98% (17 Aug 2021 14:21) (95% - 99%)    LABS:    CBC Full  -  ( 17 Aug 2021 06:27 )  WBC Count : 8.10 K/uL  RBC Count : 4.03 M/uL  Hemoglobin : 11.9 g/dL  Hematocrit : 35.8 %  Platelet Count - Automated : 221 K/uL  Mean Cell Volume : 88.8 fl  Mean Cell Hemoglobin : 29.5 pg  Mean Cell Hemoglobin Concentration : 33.2 gm/dL  Auto Neutrophil # : x  Auto Lymphocyte # : x  Auto Monocyte # : x  Auto Eosinophil # : x  Auto Basophil # : x  Auto Neutrophil % : x  Auto Lymphocyte % : x  Auto Monocyte % : x  Auto Eosinophil % : x  Auto Basophil % : x      08-17    140  |  109<H>  |  22<H>  ----------------------------<  130<H>  4.3   |  26  |  1.52<H>    Ca    8.6      17 Aug 2021 06:27  Phos  3.6     08-17  Mg     2.4     08-17      CAPILLARY BLOOD GLUCOSE      POCT Blood Glucose.: 138 mg/dL (17 Aug 2021 11:28)  POCT Blood Glucose.: 129 mg/dL (17 Aug 2021 08:05)  POCT Blood Glucose.: 198 mg/dL (16 Aug 2021 23:52)  POCT Blood Glucose.: 306 mg/dL (16 Aug 2021 21:13)  POCT Blood Glucose.: 144 mg/dL (16 Aug 2021 17:34)          Creatinine Trend: 1.52<--, 1.44<--, 1.62<--, 1.59<--  I&O's Summary    16 Aug 2021 07:01  -  17 Aug 2021 07:00  --------------------------------------------------------  IN: 240 mL / OUT: 0 mL / NET: 240 mL    17 Aug 2021 07:01  -  17 Aug 2021 15:59  --------------------------------------------------------  IN: 0 mL / OUT: 500 mL / NET: -500 mL            .Blood Blood-Venous  08-14 @ 21:09   No growth to date.  --  --      .Surgical Swab Right foot plantar wound  08-14 @ 21:08   Moderate Streptococcus agalactiae (Group B) isolated  Group B streptococci are susceptible to ampicillin,  penicillin and cefazolin, but may be resistant to  erythromycin and clindamycin.  Recommendations for intrapartum prophylaxis for Group B  streptococci are penicillin or ampicillin.  Moderate Bacteroides fragilis "Susceptibilities not performed"  Normal skin filiberto isolated  --  --          MEDICATIONS:    MEDICATIONS  (STANDING):  amLODIPine   Tablet 10 milliGRAM(s) Oral daily  ampicillin/sulbactam  IVPB      ampicillin/sulbactam  IVPB 3 Gram(s) IV Intermittent every 8 hours  aspirin  chewable 81 milliGRAM(s) Oral daily  atorvastatin 40 milliGRAM(s) Oral at bedtime  cyanocobalamin 1000 MICROGram(s) Oral daily  dextrose 40% Gel 15 Gram(s) Oral once  dextrose 5%. 1000 milliLiter(s) (50 mL/Hr) IV Continuous <Continuous>  dextrose 5%. 1000 milliLiter(s) (100 mL/Hr) IV Continuous <Continuous>  dextrose 50% Injectable 25 Gram(s) IV Push once  dextrose 50% Injectable 12.5 Gram(s) IV Push once  dextrose 50% Injectable 25 Gram(s) IV Push once  ergocalciferol 62531 Unit(s) Oral <User Schedule>  ferrous    sulfate 325 milliGRAM(s) Oral daily  glucagon  Injectable 1 milliGRAM(s) IntraMuscular once  heparin   Injectable 5000 Unit(s) SubCutaneous every 8 hours  insulin glargine Injectable (LANTUS) 7 Unit(s) SubCutaneous at bedtime  insulin lispro (ADMELOG) corrective regimen sliding scale   SubCutaneous Before meals and at bedtime  metoprolol succinate  milliGRAM(s) Oral daily  oxycodone    5 mG/acetaminophen 325 mG 1 Tablet(s) Oral every 8 hours  sodium chloride 0.9%. 1000 milliLiter(s) (75 mL/Hr) IV Continuous <Continuous>      MEDICATIONS  (PRN):      REVIEW OF SYSTEMS:                           ALL ROS DONE [ X   ]    CONSTITUTIONAL:  LETHARGIC [   ], FEVER [   ], UNRESPONSIVE [   ]  CVS:  CP  [   ], SOB, [   ], PALPITATIONS [   ], DIZZYNESS [   ]  RS: COUGH [   ], SPUTUM [   ]  GI: ABDOMINAL PAIN [   ], NAUSEA [   ], VOMITINGS [   ], DIARRHEA [   ], CONSTIPATION [   ]  :  DYSURIA [   ], NOCTURIA [   ], INCREASED FREQUENCY [   ], DRIBLING [   ],  SKELETAL: PAINFUL JOINTS [   ], SWOLLEN JOINTS [   ], NECK ACHE [   ], LOW BACK ACHE [   ],  SKIN : ULCERS [   ], RASH [   ], ITCHING [   ]  CNS: HEAD ACHE [   ], DOUBLE VISION [   ], BLURRED VISION [   ], AMS / CONFUSION [   ], SEIZURES [   ], WEAKNESS [   ],TINGLING / NUMBNESS [   ]      PHYSICAL EXAMINATION:  GENERAL APPEARANCE: NO DISTRESS  HEENT:  NO PALLOR, NO  JVD,  NO   NODES, NECK SUPPLE  CVS: S1 +, S2 +,   RS: AEEB,  OCCASIONAL  RALES +,   NO RONCHI  ABD: SOFT, NT, NO, BS +  EXT: NO PE  SKIN: WARM,   RIGHT FOOT IN BANDAGE  SKELETAL:  ROM ACCEPTABLE  CNS:  AAO X 3,   DEFICITS    RADIOLOGY :      EXAM:  XR FOOT COMP MIN 3 VIEWS RT                            PROCEDURE DATE:  08/14/2021          INTERPRETATION:  Diabetic foot ulcer cellulitis.    3 views right foot.    IMPRESSION: No fracture dislocation or focal bone destruction. No unusual periosteal reaction. Joint spaces preserved. Calcaneal enthesopathy. Extensive calcification/ossification in the region of the plantar fascia . Small vessel calcification. Diffuse soft tissue swelling may reflect cellulitis. No soft tissue gas.      ASSESSMENT :     Type 2 diabetes mellitus with foot ulcer    HTN (hypertension)    HLD (hyperlipidemia)    DM (diabetes mellitus)    CAD (coronary artery disease)    Glaucoma, angle-closure    No significant past surgical history    S/P CABG (coronary artery bypass graft)    History of thyroid surgery        PLAN:  HPI:  Patient is a 76yoM, ambulates and performs ADL independently, w/ PMH of CAD (s/p CABG >10yrs ago), DM, HTN, and HLD, presents with right foot pain x2 weeks. He reports 10/10, progressive, intermittent, sharp, non-radiating, right foot pain. He states stepping on a dog treat approximately 3 weeks ago, which he notice a development of wound at the time. Right foot pain developed a week later. It was mild initially, but progressive has gotten worsen. Patient has been non-compliant with medications for the past 2 months, because he has been feeling great. He denies fever, chills, n/v, dizziness, chest pain, sob, abdominal pain, urinary or bowel movement changes.  (14 Aug 2021 13:11)    # DIABETIC FOOT ULCER, CELLULITIS, OSTEOMYELITIS RIGHT 5th METATARSAL - NOTED XRAY AND MRI  - PER ID, HOLD ABX PENDING DEEP WOUND CX  - ON UNASYN  - NOTED SIMON, VASCULAR SX CONSULT - DR. ORTEGA  - WOUND CX - GBS, F/U BCX  - ID CONSULT, PODIATRY CONSULT    # OREN  ON SUSPECTED CKD - S/P IVF, MONITOR CR, AVOID NEPHROTOXIC AGENTS  - PLACED ON IVF, NOTED UA   - NEPHROLOGY CONSULT IN A.M. - DR. SOUZA    # DM -  HBA1C - 11, STARTED LANTUS, SSI + FS      # CAD S/P CABG - PLACED ON ASA, STATIN, BB, REVIEWED ECHOCARDIOGRAM  - ECHO - SEVERE AORTIC STENOSIS, SEVERE CONCENTRIC LVH, G1DD, MILD AZ  - CARDIOLOGY CONSULT    # SEVERE, ASYMPTOMATIC, STENOSIS - ONCE ACUTE ILLNESS RESOLVES, WILL LIKELY NEED ISCHEMIC WORKUP, SABIHA TO EVALUATE FURTHER. D/W PATIENT, DAUGHTER AND CARDIOLOGY TEAM.    # VITAMIN D DEFICIENCY - STARTED ON CHOLECALCIFEROL    # HLD - STATIN, REVIEWED LIPID PANEL    # CASE DISCUSSED AT LENGTH WITH PATIENT AND DAUGHTER, BIRDIE ROBERT - [phone]    # GI AND DVT PPX

## 2021-08-17 NOTE — DIETITIAN INITIAL EVALUATION ADULT. - PROBLEM SELECTOR PLAN 1
- p/w right foot ulcer and pain; non-complaint w/ medications  - PE: right foot fifth metatarsal plantar ulcer  - WBC wnl  - foot xray cellulitis  - probe to bone  - s/p vanco, zosyn  - c/w pain management  - hold abx until wound cx result per ID  - f/u MR foot, wound & blood cx    - podiatry onboard  - ID, Dr. Barrett, onboard plan per MD

## 2021-08-17 NOTE — PROGRESS NOTE ADULT - SUBJECTIVE AND OBJECTIVE BOX
Patient is a 76y old  Male who presents with a chief complaint of diabetic ulcer (14 Aug 2021 13:11)    Podiatry Interval HPI: Patient was seen resting comfortably in bed. Patient is AAOx3. Patient is feeling much today. Denies any acute events overnight. Denies N/V/F/C/SOB. No other pedal complaints.       Podiatry HPI: Podiatry consulted regarding 75 yo male patient who presents to ED with a chief complaint of right foot plantar ulcer. Patient states that he noticed the ulcer about 3 weeks ago. Denies any trauma or injury. He recalls stepping on a dog treat and isn't sure if that caused an opening on the bottom of his right foot. Patient states that he has been diagnosed with diabetes for a long time. Patient reports that the ulcer became painful and can hardly ambulate due to pain. Pt rates the pain 10/10 on the VAS. Denies other pedal complaints. Denies constitutional symptoms of N/V/C/F/Sob.     HPI:  Patient is a 76yoM, ambulates and performs ADL independently, w/ PMH of CAD (s/p CABG >10yrs ago), DM, HTN, and HLD, presents with right foot pain x2 weeks. He reports 10/10, progressive, intermittent, sharp, non-radiating, right foot pain. He states stepping on a dog treat approximately 3 weeks ago, which he notice a development of wound at the time. Right foot pain developed a week later. It was mild initially, but progressive has gotten worsen. Patient has been non-compliant with medications for the past 2 months, because he has been feeling great. He denies fever, chills, n/v, dizziness, chest pain, sob, abdominal pain, urinary or bowel movement changes.  (14 Aug 2021 13:11)      Medications amLODIPine   Tablet 10 milliGRAM(s) Oral daily  ampicillin/sulbactam  IVPB      ampicillin/sulbactam  IVPB 3 Gram(s) IV Intermittent every 8 hours  aspirin  chewable 81 milliGRAM(s) Oral daily  atorvastatin 40 milliGRAM(s) Oral at bedtime  dextrose 40% Gel 15 Gram(s) Oral once  dextrose 5%. 1000 milliLiter(s) IV Continuous <Continuous>  dextrose 5%. 1000 milliLiter(s) IV Continuous <Continuous>  dextrose 50% Injectable 25 Gram(s) IV Push once  dextrose 50% Injectable 12.5 Gram(s) IV Push once  dextrose 50% Injectable 25 Gram(s) IV Push once  glucagon  Injectable 1 milliGRAM(s) IntraMuscular once  heparin   Injectable 5000 Unit(s) SubCutaneous every 8 hours  insulin glargine Injectable (LANTUS) 7 Unit(s) SubCutaneous at bedtime  insulin lispro (ADMELOG) corrective regimen sliding scale   SubCutaneous Before meals and at bedtime  metoprolol succinate  milliGRAM(s) Oral daily  metoprolol succinate ER 50 milliGRAM(s) Oral once  oxycodone    5 mG/acetaminophen 325 mG 1 Tablet(s) Oral every 8 hours  sodium chloride 0.9%. 1000 milliLiter(s) IV Continuous <Continuous>    FHFamily history of acute myocardial infarction (Father)    Family history of diabetes mellitus (Mother, Sibling)    Family history of hypertension (Mother, Sibling)    Family history of hyperlipidemia (Mother, Sibling)    ,   PMHHTN (hypertension)    HLD (hyperlipidemia)    DM (diabetes mellitus)    CAD (coronary artery disease)    Glaucoma, angle-closure       PSHNo significant past surgical history    S/P CABG (coronary artery bypass graft)    History of thyroid surgery        Labs                          11.9   8.10  )-----------( 221      ( 17 Aug 2021 06:27 )             35.8      08-17    140  |  109<H>  |  22<H>  ----------------------------<  130<H>  4.3   |  26  |  1.52<H>    Ca    8.6      17 Aug 2021 06:27  Phos  3.6     08-17  Mg     2.4     08-17       Vital Signs Last 24 Hrs  T(C): 37.2 (17 Aug 2021 05:11), Max: 37.2 (17 Aug 2021 05:11)  T(F): 98.9 (17 Aug 2021 05:11), Max: 98.9 (17 Aug 2021 05:11)  HR: 76 (17 Aug 2021 09:16) (76 - 83)  BP: 164/74 (17 Aug 2021 09:16) (162/67 - 190/70)  BP(mean): --  RR: 18 (17 Aug 2021 05:11) (18 - 18)  SpO2: 99% (17 Aug 2021 05:11) (95% - 99%)  Sedimentation Rate, Erythrocyte: 44 mm/Hr (08-15-21 @ 06:18)         C-Reactive Protein, Serum: 67 mg/L (08-15-21 @ 11:55)   WBC Count: 8.10 K/uL (08-17-21 @ 06:27)  WBC Count: 7.67 K/uL (08-16-21 @ 13:13)      PHYSICAL EXAM  GEN: SHER ROBERT is a pleasant well-nourished, well developed 76y Male in no acute distress, alert awake, and oriented to person, place and time.   LE Focused:    Vasc: DP/PT 2/4 on the left, 1/4 on the right, CFT brisk to all digits, TG warm to warm on the LLE, localized edema and erythema on the lateral dorsal aspect of the right foot  Derm: Ulceration noted on submet 5 of right foot with macerated periwound and fibrotic base, measuring 0.9 x 0.5 cm. + PTB, no malodor, no tunneling, no purulent drainage upon expression  Neuro: Protective sensation and epicritic sensation grossly diminished b/l  MSK: Pain localized to the lateral aspect of the right forefoot, able to move extremities in all compartments         Imaging:   x ray of the left foot   EXAM:  XR FOOT COMP MIN 3 VIEWS RT                          PROCEDURE DATE:  08/14/2021      INTERPRETATION:  Diabetic foot ulcer cellulitis.    3 views right foot.    IMPRESSION: No fracture dislocation or focal bone destruction. No unusual periosteal reaction. Joint spaces preserved. Calcaneal enthesopathy. Extensive calcification/ossification in the region of the plantar fascia . Small vessel calcification. Diffuse soft tissue swelling may reflect cellulitis. No soft tissue gas.          Microbiology  Culture Results:   Moderate Streptococcus agalactiae (Group B) isolated   Group B streptococci are susceptible to ampicillin,   penicillin and cefazolin, but may be resistant to   erythromycin and clindamycin.   Recommendations for intrapartum prophylaxis for Group B   streptococci are penicillin or ampicillin.   Normal skin filiberto isolated (08.14.21 @ 21:08)

## 2021-08-17 NOTE — PROGRESS NOTE ADULT - PROBLEM SELECTOR PLAN 5
- h/o HTN, non-compliant w/ medications; previously on metoprolol 200mg qd, [id number] qd, and isosorbide 30mg qd    Patient with hypertension overnight to 190/70 on metoprolol 50 mg daily  Restarted amlodipine 10 mg qd and metoprolol at 100 mg daily  Monitor BP

## 2021-08-17 NOTE — DIETITIAN INITIAL EVALUATION ADULT. - PROBLEM SELECTOR PLAN 2
- h/o CAD (s/p CABG >10yr ago), non-compliant w/ medications  - EKG showed NSTEMI, but no changes from jan2021 EKG  - trop 0.22>0.22  - c/w ASA, statin, metoprolol    - Cardio, Dr. Wilkins, consulted plan per MD

## 2021-08-17 NOTE — PROGRESS NOTE ADULT - PROBLEM SELECTOR PLAN 1
- p/w right foot ulcer and pain; non-complaint w/ medications  - PE: right foot fifth metatarsal plantar ulcer  - WBC wnl  - foot xray cellulitis  - probe to bone  - s/p vanco, zosyn  - c/w pain management, dilaudid PRN  - ID recommending Unasyn, wound culture growing GBS  - f/u MR foot with + OM  - SIMON PVR with mild peripheral vascular disease    Podiatry recommending for possible R 5th metatarsal head resection procedure Thursday, requesting medical optimization, vascular consult      - podiatry onboard  - ID, Dr. Barrett, onboard

## 2021-08-17 NOTE — PROGRESS NOTE ADULT - SUBJECTIVE AND OBJECTIVE BOX
Patient is seen and examined at the bed side, is afebrile. The Podiatry follow up noted regarding scheduled  for OR on Thursday.       REVIEW OF SYSTEMS: All other review systems are negative      ALLERGIES: No Known Allergies      Vital Signs Last 24 Hrs  T(C): 36.7 (17 Aug 2021 14:21), Max: 37.2 (17 Aug 2021 05:11)  T(F): 98 (17 Aug 2021 14:21), Max: 98.9 (17 Aug 2021 05:11)  HR: 71 (17 Aug 2021 14:21) (71 - 83)  BP: 136/69 (17 Aug 2021 14:21) (136/69 - 190/70)  BP(mean): --  RR: 17 (17 Aug 2021 14:21) (17 - 18)  SpO2: 98% (17 Aug 2021 14:21) (95% - 99%)      PHYSICAL EXAM:  GENERAL: Not in distress   CHEST/LUNG:  Not using accessory muscles   HEART: s1 and s2 present  ABDOMEN:  Nontender and  Nondistended  EXTREMITIES: Right foot bandage in placed  CNS: Awake and Alert        LABS:                        11.9   8.10  )-----------( 221      ( 17 Aug 2021 06:27 )             35.8                           12.6   7.67  )-----------( 224      ( 16 Aug 2021 13:13 )             38.3       08-17    140  |  109<H>  |  22<H>  ----------------------------<  130<H>  4.3   |  26  |  1.52<H>    Ca    8.6      17 Aug 2021 06:27  Phos  3.6     08-17  Mg     2.4     08-17      08-15    137  |  104  |  25<H>  ----------------------------<  245<H>  4.0   |  24  |  1.62<H>    Ca    8.6      15 Aug 2021 06:18  Phos  3.2     08-15  Mg     2.3     08-15    TPro  7.1  /  Alb  3.4<L>  /  TBili  0.6  /  DBili  x   /  AST  11  /  ALT  16  /  AlkPhos  145<H>  08-15        CAPILLARY BLOOD GLUCOSE  POCT Blood Glucose.: 305 mg/dL (14 Aug 2021 16:20)  POCT Blood Glucose.: 281 mg/dL (14 Aug 2021 07:29)        MEDICATIONS  (STANDING):    amLODIPine   Tablet 10 milliGRAM(s) Oral daily  ampicillin/sulbactam  IVPB      ampicillin/sulbactam  IVPB 3 Gram(s) IV Intermittent every 8 hours  aspirin  chewable 81 milliGRAM(s) Oral daily  atorvastatin 40 milliGRAM(s) Oral at bedtime  cyanocobalamin 1000 MICROGram(s) Oral daily  ergocalciferol 01630 Unit(s) Oral <User Schedule>  ferrous    sulfate 325 milliGRAM(s) Oral daily  glucagon  Injectable 1 milliGRAM(s) IntraMuscular once  heparin   Injectable 5000 Unit(s) SubCutaneous every 8 hours  insulin glargine Injectable (LANTUS) 7 Unit(s) SubCutaneous at bedtime  insulin lispro (ADMELOG) corrective regimen sliding scale   SubCutaneous Before meals and at bedtime  metoprolol succinate  milliGRAM(s) Oral daily  oxycodone    5 mG/acetaminophen 325 mG 1 Tablet(s) Oral every 8 hours  sodium chloride 0.9%. 1000 milliLiter(s) (75 mL/Hr) IV Continuous <Continuous>        RADIOLOGY & ADDITIONAL TESTS:      8/16/21: US Renal (08.16.21 @ 17:02) Normal renal ultrasound.    8/16/21: MR Foot No Cont, Right (08.16.21 @ 16:52) The exam is significantly limited by motion artifact. There is a cutaneous wound along the plantar aspect of the fifth metatarsal head with adjacent cellulitic change. There is high T2 and low T1 marrow signal within the plantar aspect of the fifth metatarsal head, consistent with osteomyelitis. There is diffuse fatty atrophy and high T2 signal of musculature, consistent with denervation. There is dorsal subcutaneous soft tissue edema.      < from: Xray Foot AP + Lateral + Oblique, Right (08.14.21 @ 09:51) >  IMPRESSION: No fracture dislocation or focal bone destruction. No unusual periosteal reaction. Joint spaces preserved. Calcaneal enthesopathy. Extensive calcification/ossification in the region of the plantar fascia . Small vessel calcification. Diffuse soft tissue swelling may reflect cellulitis. No soft tissue gas.        MICROBIOLOGY DATA:    Culture - Blood (08.14.21 @ 21:09)   Specimen Source: .Blood Blood-Venous   Culture Results: No growth to date.     Culture - Blood (08.14.21 @ 21:09)   Specimen Source: .Blood Blood-Venous   Culture Results: No growth to date.     COVID-19 David Domain Antibody (08.15.21 @ 11:30)   COVID-19 David Domain Antibody Result: >250.00    Culture - Surgical Swab (08.14.21 @ 21:08)   Specimen Source: .Surgical Swab Right foot plantar wound   Culture Results:   Moderate Streptococcus agalactiae (Group B) isolated   Group B streptococci are susceptible to ampicillin,   penicillin and cefazolin, but may be resistant to   erythromycin and clindamycin.   Recommendations for intrapartum prophylaxis for Group B   streptococci are penicillin or ampicillin.   Normal skin filiberto isolated     COVID-19 PCR . (08.14.21 @ 09:41)   COVID-19 PCR: NotDetec

## 2021-08-17 NOTE — PROGRESS NOTE ADULT - ASSESSMENT
Assessment  Diabetic foot ulcer Right  DM type 2  R/o Osteomyelitis       Plan  Patient evaluated and chart created  X ray reviewed - no soft tissue gas noted  Monitor labs  SIMON: L: 0.85, R: 0.81  Recommend Vascular consult   Possible R 5th met head resection Thursday with Dr. Vazquez   Request medical optimization   Dressed the right foot wound with betadine and DSD   MRI- R 5th met head confirmed reactive bone marrow edema consistent with OM   Cont Abx per ID   Podiatry will follow while in house  Discussed with Dr. vazquez and seen bedside with Dr. Walker

## 2021-08-17 NOTE — PROGRESS NOTE ADULT - ASSESSMENT
Patient is a 76y old  Male who ambulates and performs ADL independently, w/ PMH of CAD (s/p CABG >10yrs ago), DM, HTN, and HLD, now presents to the ER for evaluation of right foot pain x2 weeks.  He states stepping on a dog treat approximately 3 weeks ago, which he notice a development of wound at the time. Right foot pain developed a week later. It was mild initially, but progressive has gotten worsen. Patient has been non-compliant with medications for the past 2 months, because he has been feeling great. ON admission, he found to have No fever but tachycardia. He has started on Unasyn and Vancomycin, and the ID consult requested to assist with further evaluation and antibiotic management.    # Right DFU - wound Cx grew Streptococcus agalactiae (Group B)- Osteomyelitis of plantar aspect of the fifth metatarsal head - on MRI 8/16/21    would recommend:    1. Continue Unasyn since wound culture grew Streptococcus agalactiae  2. Need 6 weeks if No plan for debridement   3. Monitor kidney function  4. Wound care as per Podiatry     Attending Attestation:    Spent more than 45 minutes on total encounter, more than 50 % of the visit was spent counseling and/or coordinating care by the Attending physician.   Patient is a 76y old  Male who ambulates and performs ADL independently, w/ PMH of CAD (s/p CABG >10yrs ago), DM, HTN, and HLD, now presents to the ER for evaluation of right foot pain x2 weeks.  He states stepping on a dog treat approximately 3 weeks ago, which he notice a development of wound at the time. Right foot pain developed a week later. It was mild initially, but progressive has gotten worsen. Patient has been non-compliant with medications for the past 2 months, because he has been feeling great. ON admission, he found to have No fever but tachycardia. He has started on Unasyn and Vancomycin, and the ID consult requested to assist with further evaluation and antibiotic management.    # Right DFU - wound Cx grew Streptococcus agalactiae (Group B)- Osteomyelitis of plantar aspect of the fifth metatarsal head - on MRI 8/16/21    would recommend:    1. Continue Unasyn since wound culture grew Streptococcus agalactiae  2. Need 6 weeks if No plan for debridement   3. Monitor kidney function  4. Wound care as per Podiatry     Attending Attestation:    Spent more than 35 minutes on total encounter, more than 50 % of the visit was spent counseling and/or coordinating care by the Attending physician.

## 2021-08-18 LAB
ANION GAP SERPL CALC-SCNC: 7 MMOL/L — SIGNIFICANT CHANGE UP (ref 5–17)
BLD GP AB SCN SERPL QL: SIGNIFICANT CHANGE UP
BUN SERPL-MCNC: 23 MG/DL — HIGH (ref 7–18)
CALCIUM SERPL-MCNC: 8.5 MG/DL — SIGNIFICANT CHANGE UP (ref 8.4–10.5)
CHLORIDE SERPL-SCNC: 110 MMOL/L — HIGH (ref 96–108)
CO2 SERPL-SCNC: 26 MMOL/L — SIGNIFICANT CHANGE UP (ref 22–31)
CREAT SERPL-MCNC: 1.42 MG/DL — HIGH (ref 0.5–1.3)
GLUCOSE BLDC GLUCOMTR-MCNC: 109 MG/DL — HIGH (ref 70–99)
GLUCOSE BLDC GLUCOMTR-MCNC: 115 MG/DL — HIGH (ref 70–99)
GLUCOSE BLDC GLUCOMTR-MCNC: 118 MG/DL — HIGH (ref 70–99)
GLUCOSE BLDC GLUCOMTR-MCNC: 250 MG/DL — HIGH (ref 70–99)
GLUCOSE SERPL-MCNC: 114 MG/DL — HIGH (ref 70–99)
HCT VFR BLD CALC: 36.5 % — LOW (ref 39–50)
HGB BLD-MCNC: 12.2 G/DL — LOW (ref 13–17)
MAGNESIUM SERPL-MCNC: 2.4 MG/DL — SIGNIFICANT CHANGE UP (ref 1.6–2.6)
MCHC RBC-ENTMCNC: 29.6 PG — SIGNIFICANT CHANGE UP (ref 27–34)
MCHC RBC-ENTMCNC: 33.4 GM/DL — SIGNIFICANT CHANGE UP (ref 32–36)
MCV RBC AUTO: 88.6 FL — SIGNIFICANT CHANGE UP (ref 80–100)
NRBC # BLD: 0 /100 WBCS — SIGNIFICANT CHANGE UP (ref 0–0)
PHOSPHATE SERPL-MCNC: 3.2 MG/DL — SIGNIFICANT CHANGE UP (ref 2.5–4.5)
PLATELET # BLD AUTO: 209 K/UL — SIGNIFICANT CHANGE UP (ref 150–400)
POTASSIUM SERPL-MCNC: 3.8 MMOL/L — SIGNIFICANT CHANGE UP (ref 3.5–5.3)
POTASSIUM SERPL-SCNC: 3.8 MMOL/L — SIGNIFICANT CHANGE UP (ref 3.5–5.3)
RBC # BLD: 4.12 M/UL — LOW (ref 4.2–5.8)
RBC # FLD: 13 % — SIGNIFICANT CHANGE UP (ref 10.3–14.5)
SARS-COV-2 RNA SPEC QL NAA+PROBE: SIGNIFICANT CHANGE UP
SODIUM SERPL-SCNC: 143 MMOL/L — SIGNIFICANT CHANGE UP (ref 135–145)
WBC # BLD: 6.81 K/UL — SIGNIFICANT CHANGE UP (ref 3.8–10.5)
WBC # FLD AUTO: 6.81 K/UL — SIGNIFICANT CHANGE UP (ref 3.8–10.5)

## 2021-08-18 RX ORDER — NIFEDIPINE 30 MG
60 TABLET, EXTENDED RELEASE 24 HR ORAL DAILY
Refills: 0 | Status: DISCONTINUED | OUTPATIENT
Start: 2021-08-18 | End: 2021-08-19

## 2021-08-18 RX ADMIN — Medication 60 MILLIGRAM(S): at 17:59

## 2021-08-18 RX ADMIN — OXYCODONE AND ACETAMINOPHEN 1 TABLET(S): 5; 325 TABLET ORAL at 07:43

## 2021-08-18 RX ADMIN — OXYCODONE AND ACETAMINOPHEN 1 TABLET(S): 5; 325 TABLET ORAL at 22:24

## 2021-08-18 RX ADMIN — OXYCODONE AND ACETAMINOPHEN 1 TABLET(S): 5; 325 TABLET ORAL at 14:30

## 2021-08-18 RX ADMIN — PREGABALIN 1000 MICROGRAM(S): 225 CAPSULE ORAL at 12:31

## 2021-08-18 RX ADMIN — OXYCODONE AND ACETAMINOPHEN 1 TABLET(S): 5; 325 TABLET ORAL at 06:08

## 2021-08-18 RX ADMIN — AMLODIPINE BESYLATE 10 MILLIGRAM(S): 2.5 TABLET ORAL at 06:08

## 2021-08-18 RX ADMIN — Medication 100 MILLIGRAM(S): at 06:08

## 2021-08-18 RX ADMIN — Medication 4: at 22:27

## 2021-08-18 RX ADMIN — AMPICILLIN SODIUM AND SULBACTAM SODIUM 200 GRAM(S): 250; 125 INJECTION, POWDER, FOR SUSPENSION INTRAMUSCULAR; INTRAVENOUS at 06:07

## 2021-08-18 RX ADMIN — Medication 81 MILLIGRAM(S): at 12:31

## 2021-08-18 RX ADMIN — AMPICILLIN SODIUM AND SULBACTAM SODIUM 200 GRAM(S): 250; 125 INJECTION, POWDER, FOR SUSPENSION INTRAMUSCULAR; INTRAVENOUS at 13:38

## 2021-08-18 RX ADMIN — ATORVASTATIN CALCIUM 40 MILLIGRAM(S): 80 TABLET, FILM COATED ORAL at 22:24

## 2021-08-18 RX ADMIN — HEPARIN SODIUM 5000 UNIT(S): 5000 INJECTION INTRAVENOUS; SUBCUTANEOUS at 13:38

## 2021-08-18 RX ADMIN — INSULIN GLARGINE 7 UNIT(S): 100 INJECTION, SOLUTION SUBCUTANEOUS at 22:25

## 2021-08-18 RX ADMIN — HEPARIN SODIUM 5000 UNIT(S): 5000 INJECTION INTRAVENOUS; SUBCUTANEOUS at 06:07

## 2021-08-18 RX ADMIN — OXYCODONE AND ACETAMINOPHEN 1 TABLET(S): 5; 325 TABLET ORAL at 13:37

## 2021-08-18 RX ADMIN — AMPICILLIN SODIUM AND SULBACTAM SODIUM 200 GRAM(S): 250; 125 INJECTION, POWDER, FOR SUSPENSION INTRAMUSCULAR; INTRAVENOUS at 22:25

## 2021-08-18 RX ADMIN — Medication 325 MILLIGRAM(S): at 12:31

## 2021-08-18 RX ADMIN — OXYCODONE AND ACETAMINOPHEN 1 TABLET(S): 5; 325 TABLET ORAL at 23:30

## 2021-08-18 NOTE — PROGRESS NOTE ADULT - ASSESSMENT
Patient is a 76y old  Male who ambulates and performs ADL independently, w/ PMH of CAD (s/p CABG >10yrs ago), DM, HTN, and HLD, now presents to the ER for evaluation of right foot pain x2 weeks.  He states stepping on a dog treat approximately 3 weeks ago, which he notice a development of wound at the time. Right foot pain developed a week later. It was mild initially, but progressive has gotten worsen. Patient has been non-compliant with medications for the past 2 months, because he has been feeling great. ON admission, he found to have No fever but tachycardia. He has started on Unasyn and Vancomycin, and the ID consult requested to assist with further evaluation and antibiotic management.    # Right DFU - wound Cx grew Streptococcus agalactiae (Group B)- Osteomyelitis of plantar aspect of the fifth metatarsal head - on MRI 8/16/21    would recommend:    1. Continue Unasyn since wound culture grew Streptococcus agalactiae  2. Need 6 weeks if No plan for debridement   3. Monitor kidney function  4. Wound care as per Podiatry     Attending Attestation:    Spent more than 35 minutes on total encounter, more than 50 % of the visit was spent counseling and/or coordinating care by the Attending physician.   Patient is a 76y old  Male who ambulates and performs ADL independently, w/ PMH of CAD (s/p CABG >10yrs ago), DM, HTN, and HLD, now presents to the ER for evaluation of right foot pain x2 weeks.  He states stepping on a dog treat approximately 3 weeks ago, which he notice a development of wound at the time. Right foot pain developed a week later. It was mild initially, but progressive has gotten worsen. Patient has been non-compliant with medications for the past 2 months, because he has been feeling great. ON admission, he found to have No fever but tachycardia. He has started on Unasyn and Vancomycin, and the ID consult requested to assist with further evaluation and antibiotic management.    # Right DFU - wound Cx grew Streptococcus agalactiae (Group B)- Osteomyelitis of plantar aspect of the fifth metatarsal head - on MRI 8/16/21    would recommend:      1. NPO after midnight for OR in AM  2. Continue Unasyn since wound culture grew Streptococcus agalactiae  3. Monitor kidney function  4. Wound care as per Podiatry     Attending Attestation:    Spent more than 35 minutes on total encounter, more than 50 % of the visit was spent counseling and/or coordinating care by the Attending physician.

## 2021-08-18 NOTE — PROGRESS NOTE ADULT - PROBLEM SELECTOR PLAN 5
- h/o HTN, non-compliant w/ medications; previously on metoprolol 200mg qd, [id number] qd, and isosorbide 30mg qd    Patient with hypertension overnight to 190/70 on metoprolol 50 mg daily  Restarted amlodipine 10 mg qd and metoprolol at 100 mg daily  Patient still hypertensive to 170s today, increase amlodipine 10 mg to procardia XL 60 mg daily  Monitor BP  Nephro recommending ACEI/ARB once Cr improves

## 2021-08-18 NOTE — PROGRESS NOTE ADULT - SUBJECTIVE AND OBJECTIVE BOX
Patient is a 76y old  Male who presents with a chief complaint of diabetic ulcer (14 Aug 2021 13:11)    Podiatry Interval HPI: Patient was seen resting comfortably in bed. Patient is AAOx3. Patient is amenable for the surgery tomorrow. Denies any acute overnight events. Denies N/V/F/C/SOB. No other pedal complaints.     Podiatry HPI: Podiatry consulted regarding 75 yo male patient who presents to ED with a chief complaint of right foot plantar ulcer. Patient states that he noticed the ulcer about 3 weeks ago. Denies any trauma or injury. He recalls stepping on a dog treat and isn't sure if that caused an opening on the bottom of his right foot. Patient states that he has been diagnosed with diabetes for a long time. Patient reports that the ulcer became painful and can hardly ambulate due to pain. Pt rates the pain 10/10 on the VAS. Denies other pedal complaints. Denies constitutional symptoms of N/V/C/F/Sob.     HPI:  Patient is a 76yoM, ambulates and performs ADL independently, w/ PMH of CAD (s/p CABG >10yrs ago), DM, HTN, and HLD, presents with right foot pain x2 weeks. He reports 10/10, progressive, intermittent, sharp, non-radiating, right foot pain. He states stepping on a dog treat approximately 3 weeks ago, which he notice a development of wound at the time. Right foot pain developed a week later. It was mild initially, but progressive has gotten worsen. Patient has been non-compliant with medications for the past 2 months, because he has been feeling great. He denies fever, chills, n/v, dizziness, chest pain, sob, abdominal pain, urinary or bowel movement changes.  (14 Aug 2021 13:11)        Medications amLODIPine   Tablet 10 milliGRAM(s) Oral daily  ampicillin/sulbactam  IVPB      ampicillin/sulbactam  IVPB 3 Gram(s) IV Intermittent every 8 hours  aspirin  chewable 81 milliGRAM(s) Oral daily  atorvastatin 40 milliGRAM(s) Oral at bedtime  cyanocobalamin 1000 MICROGram(s) Oral daily  dextrose 40% Gel 15 Gram(s) Oral once  dextrose 5%. 1000 milliLiter(s) IV Continuous <Continuous>  dextrose 5%. 1000 milliLiter(s) IV Continuous <Continuous>  dextrose 50% Injectable 25 Gram(s) IV Push once  dextrose 50% Injectable 12.5 Gram(s) IV Push once  dextrose 50% Injectable 25 Gram(s) IV Push once  ergocalciferol 21452 Unit(s) Oral <User Schedule>  ferrous    sulfate 325 milliGRAM(s) Oral daily  glucagon  Injectable 1 milliGRAM(s) IntraMuscular once  heparin   Injectable 5000 Unit(s) SubCutaneous every 8 hours  insulin glargine Injectable (LANTUS) 7 Unit(s) SubCutaneous at bedtime  insulin lispro (ADMELOG) corrective regimen sliding scale   SubCutaneous Before meals and at bedtime  metoprolol succinate  milliGRAM(s) Oral daily  oxycodone    5 mG/acetaminophen 325 mG 1 Tablet(s) Oral every 8 hours  sodium chloride 0.9%. 1000 milliLiter(s) IV Continuous <Continuous>    FH: Family history of acute myocardial infarction (Father)    Family history of diabetes mellitus (Mother, Sibling)    Family history of hypertension (Mother, Sibling)    Family history of hyperlipidemia (Mother, Sibling)    ,   PMH: HTN (hypertension)    HLD (hyperlipidemia)    DM (diabetes mellitus)    CAD (coronary artery disease)    Glaucoma, angle-closure       PSH: No significant past surgical history    S/P CABG (coronary artery bypass graft)    History of thyroid surgery        Labs                          12.2   6.81  )-----------( 209      ( 18 Aug 2021 06:09 )             36.5      08-18    143  |  110<H>  |  23<H>  ----------------------------<  114<H>  3.8   |  26  |  1.42<H>    Ca    8.5      18 Aug 2021 06:09  Phos  3.2     08-18  Mg     2.4     08-18       Vital Signs Last 24 Hrs  T(C): 37.4 (18 Aug 2021 05:09), Max: 37.4 (18 Aug 2021 05:09)  T(F): 99.4 (18 Aug 2021 05:09), Max: 99.4 (18 Aug 2021 05:09)  HR: 73 (18 Aug 2021 05:09) (71 - 75)  BP: 167/59 (18 Aug 2021 05:09) (134/93 - 167/59)  BP(mean): --  RR: 18 (18 Aug 2021 05:09) (17 - 18)  SpO2: 100% (18 Aug 2021 05:09) (98% - 100%)  Sedimentation Rate, Erythrocyte: 44 mm/Hr (08-15-21 @ 06:18)         C-Reactive Protein, Serum: 67 mg/L (08-15-21 @ 11:55)   WBC Count: 6.81 K/uL (08-18-21 @ 06:09)      CAPILLARY BLOOD GLUCOSE    POCT Blood Glucose.: 115 mg/dL (18 Aug 2021 11:19)  POCT Blood Glucose.: 109 mg/dL (18 Aug 2021 07:59)  POCT Blood Glucose.: 219 mg/dL (17 Aug 2021 21:01)  POCT Blood Glucose.: 134 mg/dL (17 Aug 2021 16:42)      ROS: All others negative unless otherwise stated in the HPI        PHYSICAL EXAM  GEN: SHER ROBERT is a pleasant well-nourished, well developed 76y Male in no acute distress, alert awake, and oriented to person, place and time.   LE Focused:    Vasc: DP/PT 2/4 on the left, 1/4 on the right, CFT brisk to all digits, TG warm to warm on the LLE, localized edema and erythema on the lateral dorsal aspect of the right foot  Derm: Ulceration noted on submet 5 of right foot with macerated periwound and fibrotic base, measuring 0.9 x 0.5 cm. + PTB, no malodor, no tunneling, no purulent drainage upon expression  Neuro: Protective sensation and epicritic sensation grossly diminished b/l  MSK: Pain localized to the lateral aspect of the right forefoot, able to move extremities in all compartments         Imaging:   Xray  EXAM:  XR FOOT COMP MIN 3 VIEWS RT                          PROCEDURE DATE:  08/14/2021      INTERPRETATION:  Diabetic foot ulcer cellulitis.    3 views right foot.    IMPRESSION: No fracture dislocation or focal bone destruction. No unusual periosteal reaction. Joint spaces preserved. Calcaneal enthesopathy. Extensive calcification/ossification in the region of the plantar fascia . Small vessel calcification. Diffuse soft tissue swelling may reflect cellulitis. No soft tissue gas.      MRI  EXAM:  MR FOOT RT                          PROCEDURE DATE:  08/16/2021      INTERPRETATION:  EXAMINATION: MRI of the right forefoot without contrast    CLINICAL INFORMATION: Right foot ulcer. Evaluate for osteomyelitis.    TECHNIQUE: Multiplanar, multisequential MR imaging was performed.    FINDINGS: The exam is significantly limited by motion artifact. There is a cutaneous wound along the plantar aspect of the fifth metatarsal head with adjacent cellulitic change. There is high T2 and low T1 marrow signal within the plantar aspect of the fifth metatarsal head, consistent with osteomyelitis. There is diffuse fatty atrophy and high T2 signal of musculature, consistent with denervation. There is dorsal subcutaneous soft tissue edema.    IMPRESSION: Limited exam, as above. Cutaneous wound along the plantar aspect of the fifth digit with adjacent osteomyelitis of the fifth metatarsal head.        SIMON  EXAM:  US PHYSIOL LWR EXT 3+ LEV BI                            PROCEDURE DATE:  08/16/2021          INTERPRETATION:  Clinical indication: Diabetes with right foot ulcer    COMPARISON: None    FINDINGS: There are biphasic waveforms bilaterally, dampened at the level of the metatarsals. No abnormal segmental pressure gradient.    Right SIMON = 0.82  Left SIMON = 0.85    IMPRESSION: ABIs compatible with mild peripheral vascular disease.          Microbiology  Culture Results:   Moderate Streptococcus agalactiae (Group B) isolated   Group B streptococci are susceptible to ampicillin,   penicillin and cefazolin, but may be resistant to   erythromycin and clindamycin.   Recommendations for intrapartum prophylaxis for Group B   streptococci are penicillin or ampicillin.   Normal skin filiberto isolated (08.14.21 @ 21:08)

## 2021-08-18 NOTE — PROGRESS NOTE ADULT - SUBJECTIVE AND OBJECTIVE BOX
C A R D I O L O G Y  **********************************     DATE OF SERVICE: 08-18-21    Patient denies chest pain or shortness of breath.   Review of symptoms otherwise negative.    amLODIPine   Tablet 10 milliGRAM(s) Oral daily  ampicillin/sulbactam  IVPB      ampicillin/sulbactam  IVPB 3 Gram(s) IV Intermittent every 8 hours  aspirin  chewable 81 milliGRAM(s) Oral daily  atorvastatin 40 milliGRAM(s) Oral at bedtime  cyanocobalamin 1000 MICROGram(s) Oral daily  dextrose 40% Gel 15 Gram(s) Oral once  dextrose 5%. 1000 milliLiter(s) IV Continuous <Continuous>  dextrose 5%. 1000 milliLiter(s) IV Continuous <Continuous>  dextrose 50% Injectable 25 Gram(s) IV Push once  dextrose 50% Injectable 12.5 Gram(s) IV Push once  dextrose 50% Injectable 25 Gram(s) IV Push once  ergocalciferol 68057 Unit(s) Oral <User Schedule>  ferrous    sulfate 325 milliGRAM(s) Oral daily  glucagon  Injectable 1 milliGRAM(s) IntraMuscular once  heparin   Injectable 5000 Unit(s) SubCutaneous every 8 hours  insulin glargine Injectable (LANTUS) 7 Unit(s) SubCutaneous at bedtime  insulin lispro (ADMELOG) corrective regimen sliding scale   SubCutaneous Before meals and at bedtime  metoprolol succinate  milliGRAM(s) Oral daily  oxycodone    5 mG/acetaminophen 325 mG 1 Tablet(s) Oral every 8 hours  sodium chloride 0.9%. 1000 milliLiter(s) IV Continuous <Continuous>                            12.2   6.81  )-----------( 209      ( 18 Aug 2021 06:09 )             36.5       Hemoglobin: 12.2 g/dL (08-18 @ 06:09)  Hemoglobin: 11.9 g/dL (08-17 @ 06:27)  Hemoglobin: 12.6 g/dL (08-16 @ 13:13)  Hemoglobin: 12.2 g/dL (08-15 @ 06:18)  Hemoglobin: 12.7 g/dL (08-14 @ 09:43)      08-18    143  |  110<H>  |  23<H>  ----------------------------<  114<H>  3.8   |  26  |  1.42<H>    Ca    8.5      18 Aug 2021 06:09  Phos  3.2     08-18  Mg     2.4     08-18      Creatinine Trend: 1.42<--, 1.52<--, 1.44<--, 1.62<--, 1.59<--    COAGS:           T(C): 37.4 (08-18-21 @ 05:09), Max: 37.4 (08-18-21 @ 05:09)  HR: 73 (08-18-21 @ 05:09) (71 - 75)  BP: 167/59 (08-18-21 @ 05:09) (134/93 - 167/59)  RR: 18 (08-18-21 @ 05:09) (17 - 18)  SpO2: 100% (08-18-21 @ 05:09) (98% - 100%)  Wt(kg): --    I&O's Summary    17 Aug 2021 07:01  -  18 Aug 2021 07:00  --------------------------------------------------------  IN: 240 mL / OUT: 800 mL / NET: -560 mL        HEENT:  (-)icterus (-)pallor  CV: N S1 S2 1/6 TRINIDAD (+)2 Pulses B/l  Resp:  Clear to ausculatation B/L, normal effort  GI: (+) BS Soft, NT, ND  Lymph:  (-)Edema, (-)obvious lymphadenopathy  Skin: Warm to touch, Normal turgor  Psych: Appropriate mood and affect          ASSESSMENT/PLAN: 	76y  Male ambulates and performs ADL independently, w/ PMH of CAD (s/p CABG >10yrs ago), DM, HTN, and HLD, presents with right foot pain x2 weeks. Cardiology consulted for management of cad.     - Echo noted, CINTIA of 1.0 cm moderate to severe AS, will need SABIHA and AV w/u once acute issues resolve.  HE denies CP, SOB or LOC  - no clinical CHF  - Patient is currently optimized for a low risk procedure.   Would treat as high cardiac risk.  Caution with vasodilators   - Podiatry f/u  - Abx per ID  - cont ASA, statin BB    Zak Wilkins MD, [facility]  BEEPER [phone]

## 2021-08-18 NOTE — PROGRESS NOTE ADULT - PROBLEM SELECTOR PLAN 3
- h/o CAD (s/p CABG >10yr ago), non-compliant w/ medications  - EKG showed NSTEMI, but no changes from jan2021 EKG  - trop 0.22>0.22  - c/w ASA, statin, metoprolol    TTE with G1dd, moderate to severe AS  - Cardio, Dr. Wilkins, consulted and recommending SABIHA

## 2021-08-18 NOTE — PROGRESS NOTE ADULT - PROBLEM SELECTOR PLAN 1
- p/w right foot ulcer and pain; non-complaint w/ medications  - PE: right foot fifth metatarsal plantar ulcer  - WBC wnl  - foot xray cellulitis  - probe to bone  - s/p vancashley zosyn  - c/w pain management, dilaudid PRN  - ID recommending Unasyn, wound culture growing GBS  - f/u MR foot with + OM  - SIMON PVR with mild peripheral vascular disease    Podiatry: plan for R 5th metatarsal head resection procedure tomorrow  requesting medical optimization

## 2021-08-18 NOTE — PROGRESS NOTE ADULT - ASSESSMENT
Assessment  Diabetic foot ulcer Right  DM type 2  Osteomyelitis       Plan  Patient evaluated and chart created  MRI reviewed - shows OM of the right 5th met   Dressing removed and wound evaluated  Dressed the right foot with betadine + DSD   Plan for Right 5th met head resection in the OR on Thursday with Dr. Vazquez   Appreciate vascular consult - may proceed with podiatric surgery   Request medical clearance   Covid ordered  NPO after midnght  Continue antibiotics per ID recommendation   Podiatry will follow while in house  Discussed with Dr. Vazquez  Evaluated and seen at bedside with attending Dr. Tsang

## 2021-08-18 NOTE — PROGRESS NOTE ADULT - SUBJECTIVE AND OBJECTIVE BOX
NEPHROLOGY MEDICAL CARE, [facility] - Dr. Tariq Herrera/ Dr. Lauren Lopez/ Dr. Dre Call/ Dr. Destiny Knott    Patient was seen and examined at bedside.    CC: patient is okay and no sob present.    Vital Signs Last 24 Hrs  T(C): 36.7 (18 Aug 2021 13:20), Max: 37.4 (18 Aug 2021 05:09)  T(F): 98.1 (18 Aug 2021 13:20), Max: 99.4 (18 Aug 2021 05:09)  HR: 70 (18 Aug 2021 13:20) (70 - 75)  BP: 172/73 (18 Aug 2021 13:20) (134/93 - 172/73)  BP(mean): --  RR: 18 (18 Aug 2021 13:20) (18 - 18)  SpO2: 98% (18 Aug 2021 13:20) (98% - 100%)    08-17 @ 07:01  -  08-18 @ 07:00  --------------------------------------------------------  IN: 240 mL / OUT: 800 mL / NET: -560 mL        PHYSICAL EXAM:  General: No acute respiratory distress.  Eyes: conjunctiva and sclera clear  ENMT: Atraumatic, Normocephalic, supple, No JVD present. Moist mucous membranes  Respiratory: Clear to percussion bilaterally; No rales, rhonchi, wheezing  Cardiovasular: S1S2+; no r/g; systolic murmur  Gastrointestinal: Soft, Non-tender, Nondistended; Bowel sounds present  Neuro:  Awake, Alert & Oriented X3  Ext:  No edema, No Cyanosis; Rt foot bandage.  Skin: No visible rashes      MEDICATIONS:  MEDICATIONS  (STANDING):  amLODIPine   Tablet 10 milliGRAM(s) Oral daily  ampicillin/sulbactam  IVPB      ampicillin/sulbactam  IVPB 3 Gram(s) IV Intermittent every 8 hours  aspirin  chewable 81 milliGRAM(s) Oral daily  atorvastatin 40 milliGRAM(s) Oral at bedtime  cyanocobalamin 1000 MICROGram(s) Oral daily  dextrose 40% Gel 15 Gram(s) Oral once  dextrose 5%. 1000 milliLiter(s) (50 mL/Hr) IV Continuous <Continuous>  dextrose 5%. 1000 milliLiter(s) (100 mL/Hr) IV Continuous <Continuous>  dextrose 50% Injectable 25 Gram(s) IV Push once  dextrose 50% Injectable 12.5 Gram(s) IV Push once  dextrose 50% Injectable 25 Gram(s) IV Push once  ergocalciferol 35429 Unit(s) Oral <User Schedule>  ferrous    sulfate 325 milliGRAM(s) Oral daily  glucagon  Injectable 1 milliGRAM(s) IntraMuscular once  heparin   Injectable 5000 Unit(s) SubCutaneous every 8 hours  insulin glargine Injectable (LANTUS) 7 Unit(s) SubCutaneous at bedtime  insulin lispro (ADMELOG) corrective regimen sliding scale   SubCutaneous Before meals and at bedtime  metoprolol succinate  milliGRAM(s) Oral daily  oxycodone    5 mG/acetaminophen 325 mG 1 Tablet(s) Oral every 8 hours  sodium chloride 0.9%. 1000 milliLiter(s) (75 mL/Hr) IV Continuous <Continuous>    MEDICATIONS  (PRN):          LABS:                        12.2   6.81  )-----------( 209      ( 18 Aug 2021 06:09 )             36.5     08-18    143  |  110<H>  |  23<H>  ----------------------------<  114<H>  3.8   |  26  |  1.42<H>    Ca    8.5      18 Aug 2021 06:09  Phos  3.2     08-18  Mg     2.4     08-18          Magnesium, Serum: 2.4 mg/dL (08-18 @ 06:09)  Phosphorus Level, Serum: 3.2 mg/dL (08-18 @ 06:09)    Urine studies  Urea Nitrogen,  Random Urine: 695 mg/dL (08-17 @ 09:12)  Potassium, Random Urine: 29 mmol/L (08-17 @ 06:19)  Osmolality, Random Urine: 539 mos/kg (08-17 @ 06:19)  Sodium, Random Urine: 75 mmol/L (08-17 @ 06:19)  Creatinine, Random Urine: 99 mg/dL (08-17 @ 06:19)    PTH and Vit D:

## 2021-08-18 NOTE — PHARMACOTHERAPY INTERVENTION NOTE - COMMENTS
Recommended to change to PRN. s/t PGY1 resident. Patient needs to be around the clock for now because of foot ulcer pain and he will be going to surgery tomorrow.

## 2021-08-18 NOTE — PROGRESS NOTE ADULT - SUBJECTIVE AND OBJECTIVE BOX
Patient is seen and examined at the bed side, is afebrile. The Podiatry follow up noted regarding scheduled  for OR on Thursday.       REVIEW OF SYSTEMS: All other review systems are negative      ALLERGIES: No Known Allergies      Vital Signs Last 24 Hrs  T(C): 36.6 (18 Aug 2021 17:52), Max: 37.4 (18 Aug 2021 05:09)  T(F): 97.8 (18 Aug 2021 17:52), Max: 99.4 (18 Aug 2021 05:09)  HR: 65 (18 Aug 2021 17:52) (65 - 75)  BP: 166/65 (18 Aug 2021 17:52) (134/93 - 172/73)  BP(mean): --  RR: 18 (18 Aug 2021 17:52) (18 - 18)  SpO2: 100% (18 Aug 2021 17:52) (98% - 100%)      PHYSICAL EXAM:  GENERAL: Not in distress   CHEST/LUNG:  Not using accessory muscles   HEART: s1 and s2 present  ABDOMEN:  Nontender and  Nondistended  EXTREMITIES: Right foot bandage in placed  CNS: Awake and Alert        LABS:                        12.2   6.81  )-----------( 209      ( 18 Aug 2021 06:09 )             36.5                           11.9   8.10  )-----------( 221      ( 17 Aug 2021 06:27 )             35.8       08-18    143  |  110<H>  |  23<H>  ----------------------------<  114<H>  3.8   |  26  |  1.42<H>    Ca    8.5      18 Aug 2021 06:09  Phos  3.2     08-18  Mg     2.4     08-18      08-15    137  |  104  |  25<H>  ----------------------------<  245<H>  4.0   |  24  |  1.62<H>    Ca    8.6      15 Aug 2021 06:18  Phos  3.2     08-15  Mg     2.3     08-15    TPro  7.1  /  Alb  3.4<L>  /  TBili  0.6  /  DBili  x   /  AST  11  /  ALT  16  /  AlkPhos  145<H>  08-15        CAPILLARY BLOOD GLUCOSE  POCT Blood Glucose.: 305 mg/dL (14 Aug 2021 16:20)  POCT Blood Glucose.: 281 mg/dL (14 Aug 2021 07:29)        MEDICATIONS  (STANDING):    ampicillin/sulbactam  IVPB      ampicillin/sulbactam  IVPB 3 Gram(s) IV Intermittent every 8 hours  aspirin  chewable 81 milliGRAM(s) Oral daily  atorvastatin 40 milliGRAM(s) Oral at bedtime  cyanocobalamin 1000 MICROGram(s) Oral daily  dextrose 40% Gel 15 Gram(s) Oral once  dextrose 5%. 1000 milliLiter(s) (50 mL/Hr) IV Continuous <Continuous>  dextrose 5%. 1000 milliLiter(s) (100 mL/Hr) IV Continuous <Continuous>  dextrose 50% Injectable 25 Gram(s) IV Push once  dextrose 50% Injectable 12.5 Gram(s) IV Push once  dextrose 50% Injectable 25 Gram(s) IV Push once  ergocalciferol 89854 Unit(s) Oral <User Schedule>  ferrous    sulfate 325 milliGRAM(s) Oral daily  glucagon  Injectable 1 milliGRAM(s) IntraMuscular once  insulin glargine Injectable (LANTUS) 7 Unit(s) SubCutaneous at bedtime  insulin lispro (ADMELOG) corrective regimen sliding scale   SubCutaneous Before meals and at bedtime  metoprolol succinate  milliGRAM(s) Oral daily  NIFEdipine XL 60 milliGRAM(s) Oral daily  oxycodone    5 mG/acetaminophen 325 mG 1 Tablet(s) Oral every 8 hours        RADIOLOGY & ADDITIONAL TESTS:      8/16/21: US Renal (08.16.21 @ 17:02) Normal renal ultrasound.    8/16/21: MR Foot No Cont, Right (08.16.21 @ 16:52) The exam is significantly limited by motion artifact. There is a cutaneous wound along the plantar aspect of the fifth metatarsal head with adjacent cellulitic change. There is high T2 and low T1 marrow signal within the plantar aspect of the fifth metatarsal head, consistent with osteomyelitis. There is diffuse fatty atrophy and high T2 signal of musculature, consistent with denervation. There is dorsal subcutaneous soft tissue edema.      < from: Xray Foot AP + Lateral + Oblique, Right (08.14.21 @ 09:51) >  IMPRESSION: No fracture dislocation or focal bone destruction. No unusual periosteal reaction. Joint spaces preserved. Calcaneal enthesopathy. Extensive calcification/ossification in the region of the plantar fascia . Small vessel calcification. Diffuse soft tissue swelling may reflect cellulitis. No soft tissue gas.        MICROBIOLOGY DATA:    Culture - Blood (08.14.21 @ 21:09)   Specimen Source: .Blood Blood-Venous   Culture Results: No growth to date.     Culture - Blood (08.14.21 @ 21:09)   Specimen Source: .Blood Blood-Venous   Culture Results: No growth to date.     COVID-19 David Domain Antibody (08.15.21 @ 11:30)   COVID-19 David Domain Antibody Result: >250.00    Culture - Surgical Swab (08.14.21 @ 21:08)   Specimen Source: .Surgical Swab Right foot plantar wound   Culture Results:   Moderate Streptococcus agalactiae (Group B) isolated   Group B streptococci are susceptible to ampicillin,   penicillin and cefazolin, but may be resistant to   erythromycin and clindamycin.   Recommendations for intrapartum prophylaxis for Group B   streptococci are penicillin or ampicillin.   Normal skin filiberto isolated     COVID-19 PCR . (08.14.21 @ 09:41)   COVID-19 PCR: NotDetec               Patient is seen and examined at the bed side, is afebrile. The Podiatry follow up noted regarding scheduled  for OR on Thursday, 8/19/21      REVIEW OF SYSTEMS: All other review systems are negative      ALLERGIES: No Known Allergies      Vital Signs Last 24 Hrs  T(C): 36.6 (18 Aug 2021 17:52), Max: 37.4 (18 Aug 2021 05:09)  T(F): 97.8 (18 Aug 2021 17:52), Max: 99.4 (18 Aug 2021 05:09)  HR: 65 (18 Aug 2021 17:52) (65 - 75)  BP: 166/65 (18 Aug 2021 17:52) (134/93 - 172/73)  BP(mean): --  RR: 18 (18 Aug 2021 17:52) (18 - 18)  SpO2: 100% (18 Aug 2021 17:52) (98% - 100%)      PHYSICAL EXAM:  GENERAL: Not in distress   CHEST/LUNG:  Not using accessory muscles   HEART: s1 and s2 present  ABDOMEN:  Nontender and  Nondistended  EXTREMITIES: Right foot bandage in placed  CNS: Awake and Alert        LABS:                        12.2   6.81  )-----------( 209      ( 18 Aug 2021 06:09 )             36.5                           11.9   8.10  )-----------( 221      ( 17 Aug 2021 06:27 )             35.8       08-18    143  |  110<H>  |  23<H>  ----------------------------<  114<H>  3.8   |  26  |  1.42<H>    Ca    8.5      18 Aug 2021 06:09  Phos  3.2     08-18  Mg     2.4     08-18      08-15    137  |  104  |  25<H>  ----------------------------<  245<H>  4.0   |  24  |  1.62<H>    Ca    8.6      15 Aug 2021 06:18  Phos  3.2     08-15  Mg     2.3     08-15    TPro  7.1  /  Alb  3.4<L>  /  TBili  0.6  /  DBili  x   /  AST  11  /  ALT  16  /  AlkPhos  145<H>  08-15        CAPILLARY BLOOD GLUCOSE  POCT Blood Glucose.: 305 mg/dL (14 Aug 2021 16:20)  POCT Blood Glucose.: 281 mg/dL (14 Aug 2021 07:29)        MEDICATIONS  (STANDING):    ampicillin/sulbactam  IVPB      ampicillin/sulbactam  IVPB 3 Gram(s) IV Intermittent every 8 hours  aspirin  chewable 81 milliGRAM(s) Oral daily  atorvastatin 40 milliGRAM(s) Oral at bedtime  cyanocobalamin 1000 MICROGram(s) Oral daily  dextrose 40% Gel 15 Gram(s) Oral once  dextrose 5%. 1000 milliLiter(s) (50 mL/Hr) IV Continuous <Continuous>  dextrose 5%. 1000 milliLiter(s) (100 mL/Hr) IV Continuous <Continuous>  dextrose 50% Injectable 25 Gram(s) IV Push once  dextrose 50% Injectable 12.5 Gram(s) IV Push once  dextrose 50% Injectable 25 Gram(s) IV Push once  ergocalciferol 94235 Unit(s) Oral <User Schedule>  ferrous    sulfate 325 milliGRAM(s) Oral daily  glucagon  Injectable 1 milliGRAM(s) IntraMuscular once  insulin glargine Injectable (LANTUS) 7 Unit(s) SubCutaneous at bedtime  insulin lispro (ADMELOG) corrective regimen sliding scale   SubCutaneous Before meals and at bedtime  metoprolol succinate  milliGRAM(s) Oral daily  NIFEdipine XL 60 milliGRAM(s) Oral daily  oxycodone    5 mG/acetaminophen 325 mG 1 Tablet(s) Oral every 8 hours        RADIOLOGY & ADDITIONAL TESTS:      8/16/21: US Renal (08.16.21 @ 17:02) Normal renal ultrasound.    8/16/21: MR Foot No Cont, Right (08.16.21 @ 16:52) The exam is significantly limited by motion artifact. There is a cutaneous wound along the plantar aspect of the fifth metatarsal head with adjacent cellulitic change. There is high T2 and low T1 marrow signal within the plantar aspect of the fifth metatarsal head, consistent with osteomyelitis. There is diffuse fatty atrophy and high T2 signal of musculature, consistent with denervation. There is dorsal subcutaneous soft tissue edema.      < from: Xray Foot AP + Lateral + Oblique, Right (08.14.21 @ 09:51) >  IMPRESSION: No fracture dislocation or focal bone destruction. No unusual periosteal reaction. Joint spaces preserved. Calcaneal enthesopathy. Extensive calcification/ossification in the region of the plantar fascia . Small vessel calcification. Diffuse soft tissue swelling may reflect cellulitis. No soft tissue gas.        MICROBIOLOGY DATA:    Culture - Blood (08.14.21 @ 21:09)   Specimen Source: .Blood Blood-Venous   Culture Results: No growth to date.     Culture - Blood (08.14.21 @ 21:09)   Specimen Source: .Blood Blood-Venous   Culture Results: No growth to date.     COVID-19 David Domain Antibody (08.15.21 @ 11:30)   COVID-19 David Domain Antibody Result: >250.00    Culture - Surgical Swab (08.14.21 @ 21:08)   Specimen Source: .Surgical Swab Right foot plantar wound   Culture Results:   Moderate Streptococcus agalactiae (Group B) isolated   Group B streptococci are susceptible to ampicillin,   penicillin and cefazolin, but may be resistant to   erythromycin and clindamycin.   Recommendations for intrapartum prophylaxis for Group B   streptococci are penicillin or ampicillin.   Normal skin ifliberto isolated     COVID-19 PCR . (08.14.21 @ 09:41)   COVID-19 PCR: Cal

## 2021-08-18 NOTE — PROGRESS NOTE ADULT - ASSESSMENT
1. OREN due to pre-renal azotemia due to volume depletion.  -Scr remains stable and caution with fluids since h/o Severe AS  -urinalysis shows proteinuria; uosm and FeNa is 0.76% and spot protein to creatinine ratio is 1.9gm  - renal sonogram shows no hydronephrosis with Right kidney 12.3cm and Left kidney 11.1cm  -Adjust meds to eGFR and avoid IV Gadolinium contrast,NSAIDs, and phosphate enema.  -Monitor I/O's daily.   -Monitor SMA daily.  2. CKD stage 3 most likely due to diabetic nephropathy and hypertensive nephrosclerosis with proteinuria  -patient has mild component of ckd. presently could be his baseline. would benefit with ACE-I/ARB when renal functions is stable.  -Keep patient euvolemic and renal diet  -Avoid Nephrotoxic Meds/ Agents such as (NSAIDs, IV contrast, Aminoglycosides such as gentamicin, -Gadolinium contrast, Phosphate containing enemas, etc..)  -Adjust Medications according to eGFR  3. HTN: -bp is elevated. norvasc started. continue bp meds  -titrate bp meds to keep sbp >110 and < 130  4. Mineral Bone Disease:  -phos is okay, Vitamin 25 OH is low and on ergo 50k weekly, and PTH intact around 60  5. Anemia rule out iron deficiency vs anemia of chronic disease vs anemia of ckd:  -iron panel noted and low iron sat, on ferrous tab daily,  folate is okay and  vitamin B12 is low and on vitamin b12 tabs.  -F/u CBC daily  -transfuse if HB < 7.0.  6. Rt foot ulcer: on unasyn and Plan as per podiatry; MRI of foot done and 5th head resection possible on thursday.    Discussed with patient in detail regarding the renal plan and care  Discussed the assessment and plan with Primary Team/Nurse

## 2021-08-18 NOTE — PROGRESS NOTE ADULT - PROBLEM SELECTOR PLAN 2
SCr 1.42 today, improving. 1.59 on admission, no baseline available per chart    On IV hydration  Dr. Sosa consulted

## 2021-08-18 NOTE — PROGRESS NOTE ADULT - SUBJECTIVE AND OBJECTIVE BOX
PGY-1 Progress Note discussed with attending    CHIEF COMPLAINT & BRIEF HOSPITAL COURSE:  Patient is a 76yoM, ambulates and performs ADL independently, w/ PMH of CAD (s/p CABG >10yrs ago), DM, HTN, and HLD, presents with right foot pain x2 weeks. Patient is admitted for diabetic foot ulcer.    INTERVAL HPI/OVERNIGHT EVENTS:   Patient reports mild pain of R foot. No overnight events noted.     REVIEW OF SYSTEMS:  CONSTITUTIONAL: No fever, weight loss, or fatigue  RESPIRATORY: No cough, wheezing, chills or hemoptysis; No shortness of breath  CARDIOVASCULAR: No chest pain, palpitations, dizziness, or leg swelling  GASTROINTESTINAL: No abdominal pain. No nausea, vomiting, or hematemesis; No diarrhea or constipation. No melena or hematochezia.  GENITOURINARY: No dysuria or hematuria, urinary frequency  NEUROLOGICAL: No headaches, memory loss, loss of strength, numbness, or tremors  SKIN: No itching, burning, rashes, or lesions     MEDICATIONS  (STANDING):  amLODIPine   Tablet 10 milliGRAM(s) Oral daily  ampicillin/sulbactam  IVPB      ampicillin/sulbactam  IVPB 3 Gram(s) IV Intermittent every 8 hours  aspirin  chewable 81 milliGRAM(s) Oral daily  atorvastatin 40 milliGRAM(s) Oral at bedtime  cyanocobalamin 1000 MICROGram(s) Oral daily  dextrose 40% Gel 15 Gram(s) Oral once  dextrose 5%. 1000 milliLiter(s) (50 mL/Hr) IV Continuous <Continuous>  dextrose 5%. 1000 milliLiter(s) (100 mL/Hr) IV Continuous <Continuous>  dextrose 50% Injectable 25 Gram(s) IV Push once  dextrose 50% Injectable 12.5 Gram(s) IV Push once  dextrose 50% Injectable 25 Gram(s) IV Push once  ergocalciferol 44065 Unit(s) Oral <User Schedule>  ferrous    sulfate 325 milliGRAM(s) Oral daily  glucagon  Injectable 1 milliGRAM(s) IntraMuscular once  heparin   Injectable 5000 Unit(s) SubCutaneous every 8 hours  insulin glargine Injectable (LANTUS) 7 Unit(s) SubCutaneous at bedtime  insulin lispro (ADMELOG) corrective regimen sliding scale   SubCutaneous Before meals and at bedtime  metoprolol succinate  milliGRAM(s) Oral daily  oxycodone    5 mG/acetaminophen 325 mG 1 Tablet(s) Oral every 8 hours  sodium chloride 0.9%. 1000 milliLiter(s) (75 mL/Hr) IV Continuous <Continuous>    MEDICATIONS  (PRN):      Vital Signs Last 24 Hrs  T(C): 36.7 (17 Aug 2021 14:21), Max: 37.2 (17 Aug 2021 05:11)  T(F): 98 (17 Aug 2021 14:21), Max: 98.9 (17 Aug 2021 05:11)  HR: 71 (17 Aug 2021 14:21) (71 - 83)  BP: 136/69 (17 Aug 2021 14:21) (136/69 - 190/70)  BP(mean): --  RR: 17 (17 Aug 2021 14:21) (17 - 18)  SpO2: 98% (17 Aug 2021 14:21) (95% - 99%)    PHYSICAL EXAMINATION:  GENERAL: NAD, well built  HEAD:  Atraumatic, Normocephalic  EYES:  conjunctiva and sclera clear  NECK: Supple, No JVD, Normal thyroid  CHEST/LUNG: Clear to auscultation. Clear to percussion bilaterally; No rales, rhonchi, wheezing, or rubs  HEART: Regular rate and rhythm; No murmurs, rubs, or gallops  ABDOMEN: Soft, Nontender, Nondistended; Bowel sounds present, no pain or masses on palpation  NERVOUS SYSTEM:  Alert & Oriented X3  : voiding well  EXTREMITIES:  2+ Peripheral Pulses, No clubbing, cyanosis, or edema  SKIN: warm dry                          11.9   8.10  )-----------( 221      ( 17 Aug 2021 06:27 )             35.8     08-17    140  |  109<H>  |  22<H>  ----------------------------<  130<H>  4.3   |  26  |  1.52<H>    Ca    8.6      17 Aug 2021 06:27  Phos  3.6     08-17  Mg     2.4     08-17                I&O's Summary    16 Aug 2021 07:01  -  17 Aug 2021 07:00  --------------------------------------------------------  IN: 240 mL / OUT: 0 mL / NET: 240 mL    17 Aug 2021 07:01  -  17 Aug 2021 15:55  --------------------------------------------------------  IN: 0 mL / OUT: 500 mL / NET: -500 mL          Culture - Blood (collected 14 Aug 2021 21:09)  Source: .Blood Blood-Venous  Preliminary Report (15 Aug 2021 22:02):    No growth to date.    Culture - Blood (collected 14 Aug 2021 21:09)  Source: .Blood Blood-Venous  Preliminary Report (15 Aug 2021 22:02):    No growth to date.    Culture - Surgical Swab (collected 14 Aug 2021 21:08)  Source: .Surgical Swab Right foot plantar wound  Final Report (16 Aug 2021 21:31):    Moderate Streptococcus agalactiae (Group B) isolated    Group B streptococci are susceptible to ampicillin,    penicillin and cefazolin, but may be resistant to    erythromycin and clindamycin.    Recommendations for intrapartum prophylaxis for Group B    streptococci are penicillin or ampicillin.    Moderate Bacteroides fragilis "Susceptibilities not performed"    Normal skin filiberto isolated        CAPILLARY BLOOD GLUCOSE      RADIOLOGY & ADDITIONAL TESTS:

## 2021-08-19 LAB
ANION GAP SERPL CALC-SCNC: 6 MMOL/L — SIGNIFICANT CHANGE UP (ref 5–17)
BUN SERPL-MCNC: 22 MG/DL — HIGH (ref 7–18)
CALCIUM SERPL-MCNC: 8.8 MG/DL — SIGNIFICANT CHANGE UP (ref 8.4–10.5)
CHLORIDE SERPL-SCNC: 108 MMOL/L — SIGNIFICANT CHANGE UP (ref 96–108)
CO2 SERPL-SCNC: 26 MMOL/L — SIGNIFICANT CHANGE UP (ref 22–31)
CREAT SERPL-MCNC: 1.34 MG/DL — HIGH (ref 0.5–1.3)
CULTURE RESULTS: SIGNIFICANT CHANGE UP
CULTURE RESULTS: SIGNIFICANT CHANGE UP
GLUCOSE BLDC GLUCOMTR-MCNC: 105 MG/DL — HIGH (ref 70–99)
GLUCOSE BLDC GLUCOMTR-MCNC: 115 MG/DL — HIGH (ref 70–99)
GLUCOSE BLDC GLUCOMTR-MCNC: 147 MG/DL — HIGH (ref 70–99)
GLUCOSE BLDC GLUCOMTR-MCNC: 78 MG/DL — SIGNIFICANT CHANGE UP (ref 70–99)
GLUCOSE BLDC GLUCOMTR-MCNC: 87 MG/DL — SIGNIFICANT CHANGE UP (ref 70–99)
GLUCOSE SERPL-MCNC: 77 MG/DL — SIGNIFICANT CHANGE UP (ref 70–99)
HCT VFR BLD CALC: 34.4 % — LOW (ref 39–50)
HGB BLD-MCNC: 11.6 G/DL — LOW (ref 13–17)
INR BLD: 1.07 RATIO — SIGNIFICANT CHANGE UP (ref 0.88–1.16)
MAGNESIUM SERPL-MCNC: 2.4 MG/DL — SIGNIFICANT CHANGE UP (ref 1.6–2.6)
MCHC RBC-ENTMCNC: 29.3 PG — SIGNIFICANT CHANGE UP (ref 27–34)
MCHC RBC-ENTMCNC: 33.7 GM/DL — SIGNIFICANT CHANGE UP (ref 32–36)
MCV RBC AUTO: 86.9 FL — SIGNIFICANT CHANGE UP (ref 80–100)
NRBC # BLD: 0 /100 WBCS — SIGNIFICANT CHANGE UP (ref 0–0)
PHOSPHATE SERPL-MCNC: 3.4 MG/DL — SIGNIFICANT CHANGE UP (ref 2.5–4.5)
PLATELET # BLD AUTO: 233 K/UL — SIGNIFICANT CHANGE UP (ref 150–400)
POTASSIUM SERPL-MCNC: 3.5 MMOL/L — SIGNIFICANT CHANGE UP (ref 3.5–5.3)
POTASSIUM SERPL-SCNC: 3.5 MMOL/L — SIGNIFICANT CHANGE UP (ref 3.5–5.3)
PROTHROM AB SERPL-ACNC: 12.7 SEC — SIGNIFICANT CHANGE UP (ref 10.6–13.6)
RBC # BLD: 3.96 M/UL — LOW (ref 4.2–5.8)
RBC # FLD: 12.5 % — SIGNIFICANT CHANGE UP (ref 10.3–14.5)
SODIUM SERPL-SCNC: 140 MMOL/L — SIGNIFICANT CHANGE UP (ref 135–145)
SPECIMEN SOURCE: SIGNIFICANT CHANGE UP
SPECIMEN SOURCE: SIGNIFICANT CHANGE UP
WBC # BLD: 6.53 K/UL — SIGNIFICANT CHANGE UP (ref 3.8–10.5)
WBC # FLD AUTO: 6.53 K/UL — SIGNIFICANT CHANGE UP (ref 3.8–10.5)

## 2021-08-19 PROCEDURE — 88311 DECALCIFY TISSUE: CPT | Mod: 26

## 2021-08-19 PROCEDURE — 88304 TISSUE EXAM BY PATHOLOGIST: CPT | Mod: 26

## 2021-08-19 RX ORDER — METOPROLOL TARTRATE 50 MG
100 TABLET ORAL DAILY
Refills: 0 | Status: DISCONTINUED | OUTPATIENT
Start: 2021-08-19 | End: 2021-08-24

## 2021-08-19 RX ORDER — SODIUM CHLORIDE 9 MG/ML
1000 INJECTION, SOLUTION INTRAVENOUS
Refills: 0 | Status: DISCONTINUED | OUTPATIENT
Start: 2021-08-19 | End: 2021-08-20

## 2021-08-19 RX ORDER — ERGOCALCIFEROL 1.25 MG/1
50000 CAPSULE ORAL
Refills: 0 | Status: DISCONTINUED | OUTPATIENT
Start: 2021-08-19 | End: 2021-08-24

## 2021-08-19 RX ORDER — HYDROMORPHONE HYDROCHLORIDE 2 MG/ML
0.5 INJECTION INTRAMUSCULAR; INTRAVENOUS; SUBCUTANEOUS ONCE
Refills: 0 | Status: DISCONTINUED | OUTPATIENT
Start: 2021-08-19 | End: 2021-08-19

## 2021-08-19 RX ORDER — ATORVASTATIN CALCIUM 80 MG/1
40 TABLET, FILM COATED ORAL AT BEDTIME
Refills: 0 | Status: DISCONTINUED | OUTPATIENT
Start: 2021-08-19 | End: 2021-08-24

## 2021-08-19 RX ORDER — HYDROMORPHONE HYDROCHLORIDE 2 MG/ML
0.5 INJECTION INTRAMUSCULAR; INTRAVENOUS; SUBCUTANEOUS
Refills: 0 | Status: DISCONTINUED | OUTPATIENT
Start: 2021-08-19 | End: 2021-08-19

## 2021-08-19 RX ORDER — GLUCAGON INJECTION, SOLUTION 0.5 MG/.1ML
1 INJECTION, SOLUTION SUBCUTANEOUS ONCE
Refills: 0 | Status: DISCONTINUED | OUTPATIENT
Start: 2021-08-19 | End: 2021-08-24

## 2021-08-19 RX ORDER — PREGABALIN 225 MG/1
1000 CAPSULE ORAL DAILY
Refills: 0 | Status: DISCONTINUED | OUTPATIENT
Start: 2021-08-19 | End: 2021-08-24

## 2021-08-19 RX ORDER — FERROUS SULFATE 325(65) MG
325 TABLET ORAL DAILY
Refills: 0 | Status: DISCONTINUED | OUTPATIENT
Start: 2021-08-19 | End: 2021-08-24

## 2021-08-19 RX ORDER — SODIUM CHLORIDE 9 MG/ML
1000 INJECTION, SOLUTION INTRAVENOUS
Refills: 0 | Status: DISCONTINUED | OUTPATIENT
Start: 2021-08-19 | End: 2021-08-19

## 2021-08-19 RX ORDER — OXYCODONE AND ACETAMINOPHEN 5; 325 MG/1; MG/1
1 TABLET ORAL EVERY 8 HOURS
Refills: 0 | Status: DISCONTINUED | OUTPATIENT
Start: 2021-08-19 | End: 2021-08-24

## 2021-08-19 RX ORDER — SODIUM CHLORIDE 9 MG/ML
1000 INJECTION, SOLUTION INTRAVENOUS
Refills: 0 | Status: DISCONTINUED | OUTPATIENT
Start: 2021-08-19 | End: 2021-08-24

## 2021-08-19 RX ORDER — HYDROMORPHONE HYDROCHLORIDE 2 MG/ML
1 INJECTION INTRAMUSCULAR; INTRAVENOUS; SUBCUTANEOUS
Refills: 0 | Status: DISCONTINUED | OUTPATIENT
Start: 2021-08-19 | End: 2021-08-19

## 2021-08-19 RX ORDER — AMPICILLIN SODIUM AND SULBACTAM SODIUM 250; 125 MG/ML; MG/ML
3 INJECTION, POWDER, FOR SUSPENSION INTRAMUSCULAR; INTRAVENOUS EVERY 8 HOURS
Refills: 0 | Status: DISCONTINUED | OUTPATIENT
Start: 2021-08-19 | End: 2021-08-24

## 2021-08-19 RX ORDER — INSULIN LISPRO 100/ML
VIAL (ML) SUBCUTANEOUS
Refills: 0 | Status: DISCONTINUED | OUTPATIENT
Start: 2021-08-19 | End: 2021-08-24

## 2021-08-19 RX ADMIN — Medication 100 MILLIGRAM(S): at 05:50

## 2021-08-19 RX ADMIN — ATORVASTATIN CALCIUM 40 MILLIGRAM(S): 80 TABLET, FILM COATED ORAL at 21:12

## 2021-08-19 RX ADMIN — OXYCODONE AND ACETAMINOPHEN 1 TABLET(S): 5; 325 TABLET ORAL at 22:00

## 2021-08-19 RX ADMIN — OXYCODONE AND ACETAMINOPHEN 1 TABLET(S): 5; 325 TABLET ORAL at 07:04

## 2021-08-19 RX ADMIN — Medication 60 MILLIGRAM(S): at 06:55

## 2021-08-19 RX ADMIN — OXYCODONE AND ACETAMINOPHEN 1 TABLET(S): 5; 325 TABLET ORAL at 21:12

## 2021-08-19 RX ADMIN — AMPICILLIN SODIUM AND SULBACTAM SODIUM 200 GRAM(S): 250; 125 INJECTION, POWDER, FOR SUSPENSION INTRAMUSCULAR; INTRAVENOUS at 16:51

## 2021-08-19 RX ADMIN — AMPICILLIN SODIUM AND SULBACTAM SODIUM 200 GRAM(S): 250; 125 INJECTION, POWDER, FOR SUSPENSION INTRAMUSCULAR; INTRAVENOUS at 05:50

## 2021-08-19 RX ADMIN — OXYCODONE AND ACETAMINOPHEN 1 TABLET(S): 5; 325 TABLET ORAL at 05:50

## 2021-08-19 RX ADMIN — HYDROMORPHONE HYDROCHLORIDE 0.5 MILLIGRAM(S): 2 INJECTION INTRAMUSCULAR; INTRAVENOUS; SUBCUTANEOUS at 19:30

## 2021-08-19 RX ADMIN — AMPICILLIN SODIUM AND SULBACTAM SODIUM 200 GRAM(S): 250; 125 INJECTION, POWDER, FOR SUSPENSION INTRAMUSCULAR; INTRAVENOUS at 22:36

## 2021-08-19 RX ADMIN — HYDROMORPHONE HYDROCHLORIDE 0.5 MILLIGRAM(S): 2 INJECTION INTRAMUSCULAR; INTRAVENOUS; SUBCUTANEOUS at 22:35

## 2021-08-19 NOTE — PROGRESS NOTE ADULT - SUBJECTIVE AND OBJECTIVE BOX
PGY-1 Progress Note discussed with attending    CHIEF COMPLAINT & BRIEF HOSPITAL COURSE:  Patient is a 76yoM, ambulates and performs ADL independently, w/ PMH of CAD (s/p CABG >10yrs ago), DM, HTN, and HLD, presents with right foot pain x2 weeks. Patient is admitted for diabetic foot ulcer.    INTERVAL HPI/OVERNIGHT EVENTS:   Patient reports mild pain of R foot. No overnight events noted. Plan for OR today      REVIEW OF SYSTEMS:  CONSTITUTIONAL: No fever, weight loss, or fatigue  RESPIRATORY: No cough, wheezing, chills or hemoptysis; No shortness of breath  CARDIOVASCULAR: No chest pain, palpitations, dizziness, or leg swelling  GASTROINTESTINAL: No abdominal pain. No nausea, vomiting, or hematemesis; No diarrhea or constipation. No melena or hematochezia.  GENITOURINARY: No dysuria or hematuria, urinary frequency  NEUROLOGICAL: No headaches, memory loss, loss of strength, numbness, or tremors  SKIN: No itching, burning, rashes, or lesions     MEDICATIONS  (STANDING):  ampicillin/sulbactam  IVPB 3 Gram(s) IV Intermittent every 8 hours  ampicillin/sulbactam  IVPB      aspirin  chewable 81 milliGRAM(s) Oral daily  atorvastatin 40 milliGRAM(s) Oral at bedtime  cyanocobalamin 1000 MICROGram(s) Oral daily  dextrose 40% Gel 15 Gram(s) Oral once  dextrose 5%. 1000 milliLiter(s) (50 mL/Hr) IV Continuous <Continuous>  dextrose 5%. 1000 milliLiter(s) (100 mL/Hr) IV Continuous <Continuous>  dextrose 50% Injectable 25 Gram(s) IV Push once  dextrose 50% Injectable 12.5 Gram(s) IV Push once  dextrose 50% Injectable 25 Gram(s) IV Push once  ergocalciferol 98822 Unit(s) Oral <User Schedule>  ferrous    sulfate 325 milliGRAM(s) Oral daily  glucagon  Injectable 1 milliGRAM(s) IntraMuscular once  insulin glargine Injectable (LANTUS) 7 Unit(s) SubCutaneous at bedtime  insulin lispro (ADMELOG) corrective regimen sliding scale   SubCutaneous Before meals and at bedtime  metoprolol succinate  milliGRAM(s) Oral daily  NIFEdipine XL 60 milliGRAM(s) Oral daily  oxycodone    5 mG/acetaminophen 325 mG 1 Tablet(s) Oral every 8 hours    MEDICATIONS  (PRN):      Vital Signs Last 24 Hrs  T(C): 36.8 (19 Aug 2021 13:35), Max: 36.8 (19 Aug 2021 13:35)  T(F): 98.2 (19 Aug 2021 13:35), Max: 98.2 (19 Aug 2021 13:35)  HR: 65 (19 Aug 2021 13:35) (65 - 74)  BP: 151/68 (19 Aug 2021 13:35) (143/63 - 166/65)  BP(mean): --  RR: 18 (19 Aug 2021 13:35) (18 - 18)  SpO2: 100% (19 Aug 2021 13:35) (96% - 100%)    PHYSICAL EXAMINATION:  GENERAL: NAD, well built  HEAD:  Atraumatic, Normocephalic  EYES:  conjunctiva and sclera clear  NECK: Supple, No JVD, Normal thyroid  CHEST/LUNG: Clear to auscultation. Clear to percussion bilaterally; No rales, rhonchi, wheezing, or rubs  HEART: Regular rate and rhythm; No murmurs, rubs, or gallops  ABDOMEN: Soft, Nontender, Nondistended; Bowel sounds present, no pain or masses on palpation  NERVOUS SYSTEM:  Alert & Oriented X3  EXTREMITIES:  2+ Peripheral Pulses, No clubbing, cyanosis, or edema                            11.6   6.53  )-----------( 233      ( 19 Aug 2021 07:02 )             34.4     08-19    140  |  108  |  22<H>  ----------------------------<  77  3.5   |  26  |  1.34<H>    Ca    8.8      19 Aug 2021 07:02  Phos  3.4     08-19  Mg     2.4     08-19            PT/INR - ( 19 Aug 2021 07:02 )   PT: 12.7 sec;   INR: 1.07 ratio             I&O's Summary    18 Aug 2021 07:01  -  19 Aug 2021 07:00  --------------------------------------------------------  IN: 240 mL / OUT: 400 mL / NET: -160 mL            CAPILLARY BLOOD GLUCOSE      RADIOLOGY & ADDITIONAL TESTS:

## 2021-08-19 NOTE — BRIEF OPERATIVE NOTE - CONDITION POST OP
Detail Level: Detailed Quality 110: Preventive Care And Screening: Influenza Immunization: Influenza Immunization previously received during influenza season Stable

## 2021-08-19 NOTE — DISCHARGE NOTE PROVIDER - HOSPITAL COURSE
Patient is a 77 y/o M (ambulates and ADL independently) w PMH of CAD (s/p CABG >10yrs ago), DM, HTN, and HLD, presents with right foot pain x2 weeks admitted for diabetic right foot ulcer. Patient was evaluated by infectious disease and started on IV unasyn for foot ulcer (wound culture growing GBS), XR showed cellulitis and MRI with osteomyelitis. Patient was evaluated by podiatry who performed right 5th metatarsal head resection. Patient also noted to have OREN, nephrology was consulted and recommended starting ACEI once Cr improves given significant proteinuria. Paitent was evaluated by cardiology given history of CAD, TTE showed G1DD and moderate to severe AS with recommendation for outpatient SABHIA. Patient is a 77 y/o M (ambulates and ADL independently) w PMH of CAD (s/p CABG >10yrs ago), DM, HTN, and HLD, presents with right foot pain x2 weeks admitted for diabetic right foot ulcer. Patient was evaluated by infectious disease and started on IV unasyn for foot ulcer (wound culture growing GBS), XR showed cellulitis and MRI with osteomyelitis. Patient was evaluated by podiatry who performed right 5th metatarsal head resection. Patient underwent SIMON/PVR as per vascular evaluation recommendations which showed mild PVD. Patient also noted to have OREN, nephrology was consulted and recommended starting ACEI once Cr improves given significant proteinuria. Paitent was evaluated by cardiology given history of CAD, TTE showed G1DD and moderate to severe AS with recommendation for outpatient SABIHA. For HTN, patient's antihypertensives were titrated as tolerated, isosorbide was held and patient's amlodipine was discontinued, started on nifedipine ER 60 mg and BP was well controlled prior to discharge. Patient is a 77 y/o M (ambulates and ADL independently) w PMH of CAD (s/p CABG >10yrs ago), DM, HTN, and HLD, presents with right foot pain x2 weeks admitted for diabetic right foot ulcer. Patient was evaluated by infectious disease and started on IV unasyn for foot ulcer (wound culture growing GBS), XR showed cellulitis and MRI with osteomyelitis. Patient was evaluated by podiatry who performed right 5th metatarsal head resection. Patient underwent SIMON/PVR as per vascular evaluation recommendations which showed mild PVD. Patient also noted to have OREN, nephrology was consulted and recommended starting ACEI once Cr improves given significant proteinuria. Paitent was evaluated by cardiology given history of CAD, TTE showed G1DD and moderate to severe AS with recommendation for outpatient SABIHA. For HTN, patient's antihypertensives were titrated as tolerated, isosorbide was held and patient's amlodipine was discontinued, started on nifedipine ER 60 mg and BP was well controlled prior to discharge.       Given patient's improved clinical status and current hemodynamic stability, decision was made to discharge. Discussed with attending  Please refer to patient's complete medical chart with documents for a full hospital course, for this is only a brief summary. Patient is a 75 y/o M (ambulates and ADL independently) w PMH of CAD (s/p CABG >10yrs ago), DM, HTN, and HLD, presents with right foot pain x2 weeks admitted for diabetic right foot ulcer. Patient was evaluated by infectious disease and started on IV unasyn for foot ulcer (wound culture growing GBS), XR showed cellulitis and MRI with osteomyelitis. Patient was evaluated by podiatry who performed right 5th metatarsal head resection. Patient underwent SIMON/PVR as per vascular evaluation recommendations which showed mild PVD. Patient also noted to have OREN, nephrology was consulted and recommended starting ACEI once Cr improves given significant proteinuria. Paitent was evaluated by cardiology given history of CAD, TTE showed G1DD and moderate to severe AS with recommendation for outpatient SABIHA. For HTN, patient's antihypertensives were titrated as tolerated, isosorbide was held and patient's amlodipine was discontinued, started on nifedipine ER 60 mg and BP was well controlled prior to discharge. Patient seen by physical therapy, recommending STEVAN.       Given patient's improved clinical status and current hemodynamic stability, decision was made to discharge. Discussed with attending  Please refer to patient's complete medical chart with documents for a full hospital course, for this is only a brief summary.

## 2021-08-19 NOTE — DISCHARGE NOTE PROVIDER - PROVIDER TOKENS
PROVIDER:[TOKEN:[[id number]]] PROVIDER:[TOKEN:[[id number]]],PROVIDER:[TOKEN:[[id number]]]

## 2021-08-19 NOTE — BRIEF OPERATIVE NOTE - NSICDXBRIEFPOSTOP_GEN_ALL_CORE_FT
POST-OP DIAGNOSIS:  Osteomyelitis of fifth toe of right foot 19-Aug-2021 19:04:46  Jeancarlos Hitchcock

## 2021-08-19 NOTE — PROGRESS NOTE ADULT - SUBJECTIVE AND OBJECTIVE BOX
Patient is a 76y old  Male who presents with a chief complaint of diabetic ulcer (14 Aug 2021 13:11)    Podiatry Interval HPI: Patient was seen resting comfortably in bed. Patient is AAOx3. Patient is amenable for the surgery this afternoon. Denies any acute overnight events. Denies N/V/F/C/SOB. No other pedal complaints.     Podiatry HPI: Podiatry consulted regarding 77 yo male patient who presents to ED with a chief complaint of right foot plantar ulcer. Patient states that he noticed the ulcer about 3 weeks ago. Denies any trauma or injury. He recalls stepping on a dog treat and isn't sure if that caused an opening on the bottom of his right foot. Patient states that he has been diagnosed with diabetes for a long time. Patient reports that the ulcer became painful and can hardly ambulate due to pain. Pt rates the pain 10/10 on the VAS. Denies other pedal complaints. Denies constitutional symptoms of N/V/C/F/Sob.     HPI:  Patient is a 76yoM, ambulates and performs ADL independently, w/ PMH of CAD (s/p CABG >10yrs ago), DM, HTN, and HLD, presents with right foot pain x2 weeks. He reports 10/10, progressive, intermittent, sharp, non-radiating, right foot pain. He states stepping on a dog treat approximately 3 weeks ago, which he notice a development of wound at the time. Right foot pain developed a week later. It was mild initially, but progressive has gotten worsen. Patient has been non-compliant with medications for the past 2 months, because he has been feeling great. He denies fever, chills, n/v, dizziness, chest pain, sob, abdominal pain, urinary or bowel movement changes.  (14 Aug 2021 13:11)        Medications ampicillin/sulbactam  IVPB 3 Gram(s) IV Intermittent every 8 hours  ampicillin/sulbactam  IVPB      aspirin  chewable 81 milliGRAM(s) Oral daily  atorvastatin 40 milliGRAM(s) Oral at bedtime  cyanocobalamin 1000 MICROGram(s) Oral daily  dextrose 40% Gel 15 Gram(s) Oral once  dextrose 5%. 1000 milliLiter(s) IV Continuous <Continuous>  dextrose 5%. 1000 milliLiter(s) IV Continuous <Continuous>  dextrose 50% Injectable 25 Gram(s) IV Push once  dextrose 50% Injectable 12.5 Gram(s) IV Push once  dextrose 50% Injectable 25 Gram(s) IV Push once  ergocalciferol 27682 Unit(s) Oral <User Schedule>  ferrous    sulfate 325 milliGRAM(s) Oral daily  glucagon  Injectable 1 milliGRAM(s) IntraMuscular once  insulin glargine Injectable (LANTUS) 7 Unit(s) SubCutaneous at bedtime  insulin lispro (ADMELOG) corrective regimen sliding scale   SubCutaneous Before meals and at bedtime  metoprolol succinate  milliGRAM(s) Oral daily  NIFEdipine XL 60 milliGRAM(s) Oral daily  oxycodone    5 mG/acetaminophen 325 mG 1 Tablet(s) Oral every 8 hours    FHFamily history of acute myocardial infarction (Father)    Family history of diabetes mellitus (Mother, Sibling)    Family history of hypertension (Mother, Sibling)    Family history of hyperlipidemia (Mother, Sibling)    ,   PMHHTN (hypertension)    HLD (hyperlipidemia)    DM (diabetes mellitus)    CAD (coronary artery disease)    Glaucoma, angle-closure       PSHNo significant past surgical history    S/P CABG (coronary artery bypass graft)    History of thyroid surgery        Labs                          11.6   6.53  )-----------( 233      ( 19 Aug 2021 07:02 )             34.4      08-19    140  |  108  |  22<H>  ----------------------------<  77  3.5   |  26  |  1.34<H>    Ca    8.8      19 Aug 2021 07:02  Phos  3.4     08-19  Mg     2.4     08-19       Vital Signs Last 24 Hrs  T(C): 36.8 (19 Aug 2021 13:35), Max: 36.8 (19 Aug 2021 13:35)  T(F): 98.2 (19 Aug 2021 13:35), Max: 98.2 (19 Aug 2021 13:35)  HR: 65 (19 Aug 2021 13:35) (65 - 74)  BP: 151/68 (19 Aug 2021 13:35) (143/63 - 166/65)  BP(mean): --  RR: 18 (19 Aug 2021 13:35) (18 - 18)  SpO2: 100% (19 Aug 2021 13:35) (96% - 100%)  Sedimentation Rate, Erythrocyte: 44 mm/Hr (08-15-21 @ 06:18)         C-Reactive Protein, Serum: 67 mg/L (08-15-21 @ 11:55)   WBC Count: 6.53 K/uL (08-19-21 @ 07:02)        ROS: All others negative unless otherwise stated in the HPI        PHYSICAL EXAM  GEN: SHER ROBERT is a pleasant well-nourished, well developed 76y Male in no acute distress, alert awake, and oriented to person, place and time.   LE Focused:    Vasc: DP/PT 2/4 on the left, 1/4 on the right, CFT brisk to all digits, TG warm to warm on the LLE, localized edema and erythema on the lateral dorsal aspect of the right foot  Derm: Ulceration noted on submet 5 of right foot with macerated periwound and fibrotic base, measuring 0.9 x 0.5 cm. + PTB, no malodor, no tunneling, no purulent drainage upon expression  Neuro: Protective sensation and epicritic sensation grossly diminished b/l  MSK: Pain localized to the lateral aspect of the right forefoot, able to move extremities in all compartments         Imaging:   Xray  EXAM:  XR FOOT COMP MIN 3 VIEWS RT                          PROCEDURE DATE:  08/14/2021      INTERPRETATION:  Diabetic foot ulcer cellulitis.    3 views right foot.    IMPRESSION: No fracture dislocation or focal bone destruction. No unusual periosteal reaction. Joint spaces preserved. Calcaneal enthesopathy. Extensive calcification/ossification in the region of the plantar fascia . Small vessel calcification. Diffuse soft tissue swelling may reflect cellulitis. No soft tissue gas.      MRI  EXAM:  MR FOOT RT                          PROCEDURE DATE:  08/16/2021      INTERPRETATION:  EXAMINATION: MRI of the right forefoot without contrast    CLINICAL INFORMATION: Right foot ulcer. Evaluate for osteomyelitis.    TECHNIQUE: Multiplanar, multisequential MR imaging was performed.    FINDINGS: The exam is significantly limited by motion artifact. There is a cutaneous wound along the plantar aspect of the fifth metatarsal head with adjacent cellulitic change. There is high T2 and low T1 marrow signal within the plantar aspect of the fifth metatarsal head, consistent with osteomyelitis. There is diffuse fatty atrophy and high T2 signal of musculature, consistent with denervation. There is dorsal subcutaneous soft tissue edema.    IMPRESSION: Limited exam, as above. Cutaneous wound along the plantar aspect of the fifth digit with adjacent osteomyelitis of the fifth metatarsal head.        SIMON  EXAM:  US PHYSIOL LWR EXT 3+ LEV BI                            PROCEDURE DATE:  08/16/2021          INTERPRETATION:  Clinical indication: Diabetes with right foot ulcer    COMPARISON: None    FINDINGS: There are biphasic waveforms bilaterally, dampened at the level of the metatarsals. No abnormal segmental pressure gradient.    Right SIMON = 0.82  Left SIMON = 0.85    IMPRESSION: ABIs compatible with mild peripheral vascular disease.          Microbiology  Culture Results:   Moderate Streptococcus agalactiae (Group B) isolated   Group B streptococci are susceptible to ampicillin,   penicillin and cefazolin, but may be resistant to   erythromycin and clindamycin.   Recommendations for intrapartum prophylaxis for Group B   streptococci are penicillin or ampicillin.   Normal skin filiberto isolated (08.14.21 @ 21:08)

## 2021-08-19 NOTE — PROGRESS NOTE ADULT - ASSESSMENT
Patient is a 76y old  Male who ambulates and performs ADL independently, w/ PMH of CAD (s/p CABG >10yrs ago), DM, HTN, and HLD, now presents to the ER for evaluation of right foot pain x2 weeks.  He states stepping on a dog treat approximately 3 weeks ago, which he notice a development of wound at the time. Right foot pain developed a week later. It was mild initially, but progressive has gotten worsen. Patient has been non-compliant with medications for the past 2 months, because he has been feeling great. ON admission, he found to have No fever but tachycardia. He has started on Unasyn and Vancomycin, and the ID consult requested to assist with further evaluation and antibiotic management.    # Right DFU - wound Cx grew Streptococcus agalactiae (Group B)- Osteomyelitis of plantar aspect of the fifth metatarsal head - on MRI 8/16/21    would recommend:    1.   2. Continue Unasyn since wound culture grew Streptococcus agalactiae  3. Monitor kidney function  4. Wound care as per Podiatry     Attending Attestation:    Spent more than 35 minutes on total encounter, more than 50 % of the visit was spent counseling and/or coordinating care by the Attending physician. Patient is a 76y old  Male who ambulates and performs ADL independently, w/ PMH of CAD (s/p CABG >10yrs ago), DM, HTN, and HLD, now presents to the ER for evaluation of right foot pain x2 weeks.  He states stepping on a dog treat approximately 3 weeks ago, which he notice a development of wound at the time. Right foot pain developed a week later. It was mild initially, but progressive has gotten worsen. Patient has been non-compliant with medications for the past 2 months, because he has been feeling great. ON admission, he found to have No fever but tachycardia. He has started on Unasyn and Vancomycin, and the ID consult requested to assist with further evaluation and antibiotic management.    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 Post op Lbas  2. Follow up intra-op culture  3. Continue Unasyn since wound culture grew Streptococcus agalactiae  4. Monitor kidney function  5. Wound care as per Podiatry     Attending Attestation:    Spent more than 35 minutes on total encounter, more than 50 % of the visit was spent counseling and/or coordinating care by the Attending physician.

## 2021-08-19 NOTE — PROGRESS NOTE ADULT - PROBLEM SELECTOR PLAN 5
- h/o HTN, non-compliant w/ medications; previously on metoprolol 200mg qd, [id number] qd, and isosorbide 30mg qd    Patient with hypertension overnight to 190/70 on metoprolol 50 mg daily  Restarted amlodipine 10 mg qd and metoprolol at 100 mg daily  Patient still hypertensive to 170s today, increase amlodipine 10 mg to procardia XL 60 mg daily  Monitor BP  Nephro recommending ACEI/ARB once Cr improves

## 2021-08-19 NOTE — PROGRESS NOTE ADULT - SUBJECTIVE AND OBJECTIVE BOX
NEPHROLOGY MEDICAL CARE, [facility] - Dr. Tariq Herrera/ Dr. Laruen Lopez/ Dr. Dre Call/ Dr. Destiny Knott    Patient was seen and examined at bedside.    CC: patient is okay; NAD    Vital Signs Last 24 Hrs  T(C): 36.6 (19 Aug 2021 05:02), Max: 36.7 (18 Aug 2021 21:06)  T(F): 97.8 (19 Aug 2021 05:02), Max: 98 (18 Aug 2021 21:06)  HR: 69 (19 Aug 2021 05:02) (65 - 74)  BP: 155/51 (19 Aug 2021 05:02) (143/63 - 166/65)  BP(mean): --  RR: 18 (19 Aug 2021 05:02) (18 - 18)  SpO2: 100% (19 Aug 2021 05:02) (96% - 100%)    08-18 @ 07:01  -  08-19 @ 07:00  --------------------------------------------------------  IN: 240 mL / OUT: 400 mL / NET: -160 mL        PHYSICAL EXAM:  General: No acute respiratory distress.  Eyes: conjunctiva and sclera clear  ENMT: Atraumatic, Normocephalic, supple, No JVD present. Moist mucous membranes  Respiratory: Clear to percussion bilaterally; No rales, rhonchi, wheezing  Cardiovasular: S1S2+; no r/g; systolic murmur  Gastrointestinal: Soft, Non-tender, Nondistended; Bowel sounds present  Neuro:  Awake, Alert & Oriented X3  Ext:  No edema, No Cyanosis; Rt foot bandage.  Skin: No visible rashes    MEDICATIONS:  MEDICATIONS  (STANDING):  ampicillin/sulbactam  IVPB 3 Gram(s) IV Intermittent every 8 hours  ampicillin/sulbactam  IVPB      aspirin  chewable 81 milliGRAM(s) Oral daily  atorvastatin 40 milliGRAM(s) Oral at bedtime  cyanocobalamin 1000 MICROGram(s) Oral daily  dextrose 40% Gel 15 Gram(s) Oral once  dextrose 5%. 1000 milliLiter(s) (50 mL/Hr) IV Continuous <Continuous>  dextrose 5%. 1000 milliLiter(s) (100 mL/Hr) IV Continuous <Continuous>  dextrose 50% Injectable 25 Gram(s) IV Push once  dextrose 50% Injectable 12.5 Gram(s) IV Push once  dextrose 50% Injectable 25 Gram(s) IV Push once  ergocalciferol 14295 Unit(s) Oral <User Schedule>  ferrous    sulfate 325 milliGRAM(s) Oral daily  glucagon  Injectable 1 milliGRAM(s) IntraMuscular once  insulin glargine Injectable (LANTUS) 7 Unit(s) SubCutaneous at bedtime  insulin lispro (ADMELOG) corrective regimen sliding scale   SubCutaneous Before meals and at bedtime  metoprolol succinate  milliGRAM(s) Oral daily  NIFEdipine XL 60 milliGRAM(s) Oral daily  oxycodone    5 mG/acetaminophen 325 mG 1 Tablet(s) Oral every 8 hours    MEDICATIONS  (PRN):          LABS:                        11.6   6.53  )-----------( 233      ( 19 Aug 2021 07:02 )             34.4     08-19    140  |  108  |  22<H>  ----------------------------<  77  3.5   |  26  |  1.34<H>    Ca    8.8      19 Aug 2021 07:02  Phos  3.4     08-19  Mg     2.4     08-19      PT/INR - ( 19 Aug 2021 07:02 )   PT: 12.7 sec;   INR: 1.07 ratio             Magnesium, Serum: 2.4 mg/dL (08-19 @ 07:02)  Phosphorus Level, Serum: 3.4 mg/dL (08-19 @ 07:02)    Urine studies  Urea Nitrogen,  Random Urine: 695 mg/dL (08-17 @ 09:12)  Potassium, Random Urine: 29 mmol/L (08-17 @ 06:19)  Osmolality, Random Urine: 539 mos/kg (08-17 @ 06:19)  Sodium, Random Urine: 75 mmol/L (08-17 @ 06:19)  Creatinine, Random Urine: 99 mg/dL (08-17 @ 06:19)    PTH and Vit D:

## 2021-08-19 NOTE — DISCHARGE NOTE PROVIDER - CARE PROVIDERS DIRECT ADDRESSES
,DirectAddress_Unknown ,DirectAddress_Unknown,[email or web address]

## 2021-08-19 NOTE — DISCHARGE NOTE PROVIDER - NSDCCPCAREPLAN_GEN_ALL_CORE_FT
PRINCIPAL DISCHARGE DIAGNOSIS  Diagnosis: Cellulitis of foot without toes  Assessment and Plan of Treatment: You were diagnosed with osteomyelitis and cellulitis secondary to diabetic ulcer of the right foot.   - You presented left lower extremity cellulitis requiring IV antibiotics.    - You were evaluated by podiatry who recommended wound care, and prefromed partial resection of  your right 5th toe.   - You also had osteomyelitis, which is infection of the bone   - We treated your cellulitis and osteomyelitis with Intravenous antibiotics and daily wound care and dressing.  - Please continue current medications regimen regularly as prescribed and schedule a close follow up with your PCP, Podiatry and Wound care within 1 week for follow up.  - Please check regularly the leg and foot for any discharge or cut, dress the wound daily   - You were seen by physical therapy who recommended you go to rehab for 3-4 weeks.        SECONDARY DISCHARGE DIAGNOSES  Diagnosis: Diabetes mellitus  Assessment and Plan of Treatment: Maintaining blood glucose level within normal range.  - You have a history of diabetes  - Your HbA1c is 11.4  - You should continue to take your medication regimen regularly as prescribed  - Please follow up with your primary care provider/endocrinologist within a week of discharge.  - You need to continue monitoring your blood sugar levels closely.  - Please maintain healthy lifestyle by eating healthy diabetic regimen, weight loss and exercise regularly as tolerated.  Make sure you get your HgA1c checked every three months.  If you take oral diabetes medications, check your blood glucose two times a day.  If you take insulin, check your blood glucose before meals and at bedtime.  It's important not to skip any meals.  Keep a log of your blood glucose results and always take it with you to your doctor appointments.  Keep a list of your current medications including injectables and over the counter medications and bring this medication list with you to all your doctor appointments.  If you have not seen your ophthalmologist this year call for appointment.  Check your feet daily for redness, sores, or openings. Do not self treat. If no improvement in two days call your primary care physician for an appointment.  Low blood sugar (hypoglycemia) is a blood sugar below 70mg/dl. Check your blood sugar if you feel signs/symptoms of hypoglycemia. If your blood sugar is below 70 take 15 grams of carbohydrates (ex 4 oz of apple juice, 3-4 glucose tablets, or 4-6 oz of regular soda) wait 15 minutes and repeat blood sugar to make sure it comes up above 70.  If your blood sugar is above 70 and you are due for a meal, have a meal.  If you are not due for a meal have a snack.  This snack helps keeps your blood sugar at a safe range.       PRINCIPAL DISCHARGE DIAGNOSIS  Diagnosis: Type 2 diabetes mellitus with foot ulcer  Assessment and Plan of Treatment: You were diagnosed with osteomyelitis and cellulitis secondary to diabetic ulcer of the right foot.   - You presented with left lower extremity cellulitis   - You had osteomyelitis, which is infection of the bone   - You were evaluated by podiatry who recommended wound care, and prefromed partial resection of  your right 5th toe.   - We treated your cellulitis and osteomyelitis with Intravenous antibiotics and daily wound care and dressing.  - Please continue current medications regimen regularly as prescribed and schedule a close follow up with your PCP, Podiatry and Wound care within 1 week for follow up.  - Please check regularly the leg and foot for any discharge or cut, dress the wound daily   - You were seen by physical therapy who recommended you go to rehab for 3-4 weeks.        SECONDARY DISCHARGE DIAGNOSES  Diagnosis: Diabetes mellitus  Assessment and Plan of Treatment: Maintaining blood glucose level within normal range.  - You have a history of diabetes  - Your HbA1c is 11.4  - You should continue to take your medication regimen regularly as prescribed  - Please follow up with your primary care provider/endocrinologist within a week of discharge.  - You need to continue monitoring your blood sugar levels closely.  - Please maintain healthy lifestyle by eating healthy diabetic regimen, weight loss and exercise regularly as tolerated.  Make sure you get your HgA1c checked every three months.  If you take oral diabetes medications, check your blood glucose two times a day.  If you take insulin, check your blood glucose before meals and at bedtime.  It's important not to skip any meals.  Keep a log of your blood glucose results and always take it with you to your doctor appointments.  Keep a list of your current medications including injectables and over the counter medications and bring this medication list with you to all your doctor appointments.  If you have not seen your ophthalmologist this year call for appointment.  Check your feet daily for redness, sores, or openings. Do not self treat. If no improvement in two days call your primary care physician for an appointment.  Low blood sugar (hypoglycemia) is a blood sugar below 70mg/dl. Check your blood sugar if you feel signs/symptoms of hypoglycemia. If your blood sugar is below 70 take 15 grams of carbohydrates (ex 4 oz of apple juice, 3-4 glucose tablets, or 4-6 oz of regular soda) wait 15 minutes and repeat blood sugar to make sure it comes up above 70.  If your blood sugar is above 70 and you are due for a meal, have a meal.  If you are not due for a meal have a snack.  This snack helps keeps your blood sugar at a safe range.      Diagnosis: HTN (hypertension)  Assessment and Plan of Treatment: Blood Pressure Control , Please continue current medication regimen, and follow up with your PCP  - You have a history of Hypertension.   - Your Blood Pressure was adequately controlled with metoprolol and nifedepine.   - You should continue on the current antihypertensive regimen regularly.  - You blood pressure should be within 140-120/80-90.  - You should follow-up with your PCP within 1 week of your discharge for routine blood pressure monitoring at your next visit.  - Notify your doctor if you have any of the following symptoms:   (Dizziness, Lightheadedness, Blurry vision, Headache, Chest pain, Shortness of breath.)  - You should maintain healthy lifestyle by eating healthy low salt diet, avoid fatty food, weight loss, exercise regularly as tolerated 30 mins X 3 time per week.      Diagnosis: HLD (hyperlipidemia)  Assessment and Plan of Treatment: Please take your medication Lipitor 40mg regularly   Please maintain healthy lifestyle by eating healthy as mentioned above, exercise regularly and maintain weight.   Please Follow up with your doctor in 1 week.

## 2021-08-19 NOTE — BRIEF OPERATIVE NOTE - NSICDXBRIEFPREOP_GEN_ALL_CORE_FT
PRE-OP DIAGNOSIS:  Osteomyelitis of fifth toe of right foot 19-Aug-2021 19:04:25  Jeancarlos Hitchcock

## 2021-08-19 NOTE — PROGRESS NOTE ADULT - ASSESSMENT
Assessment  Osteomyelitis R 5th digit   Diabetic ulcer R sub 5th met  DM Type II        Plan  Patient evaluated and chart created  MRI reviewed - shows OM of the right 5th met   Dressing removed and wound evaluated  Dressed the right foot with betadine + DSD   OR today (8/19) for R 5th met ray amputation   Appreciate medical optimization    NPO since midnight   COVID - (8/18)  Appreciate vascular consult - may proceed with podiatric surgery   Continue antibiotics per ID recommendation   Podiatry will follow while in house  Discussed with Dr. Vazquez

## 2021-08-19 NOTE — PROGRESS NOTE ADULT - PROBLEM SELECTOR PLAN 2
SCr 1.34 today, improving. 1.59 on admission, no baseline available per chart    On IV hydration  Dr. Sosa consulted

## 2021-08-19 NOTE — PROGRESS NOTE ADULT - SUBJECTIVE AND OBJECTIVE BOX
Patient is seen and examined at the bed side, is afebrile. The Podiatry follow up noted regarding scheduled  for OR on Thursday, 8/19/21      REVIEW OF SYSTEMS: All other review systems are negative      ALLERGIES: No Known Allergies      Vital Signs Last 24 Hrs  T(C): 36.5 (19 Aug 2021 19:00), Max: 36.8 (19 Aug 2021 13:35)  T(F): 97.7 (19 Aug 2021 19:00), Max: 98.2 (19 Aug 2021 13:35)  HR: 69 (19 Aug 2021 19:45) (65 - 76)  BP: 135/63 (19 Aug 2021 19:45) (123/61 - 155/51)  BP(mean): 83 (19 Aug 2021 19:45) (74 - 83)  RR: 20 (19 Aug 2021 19:45) (14 - 20)  SpO2: 100% (19 Aug 2021 19:45) (96% - 100%)      PHYSICAL EXAM:  GENERAL: Not in distress   CHEST/LUNG:  Not using accessory muscles   HEART: s1 and s2 present  ABDOMEN:  Nontender and  Nondistended  EXTREMITIES: Right foot bandage in placed  CNS: Awake and Alert        LABS:                        11.6   6.53  )-----------( 233      ( 19 Aug 2021 07:02 )             34.4                           12.2   6.81  )-----------( 209      ( 18 Aug 2021 06:09 )             36.5                08-19    140  |  108  |  22<H>  ----------------------------<  77  3.5   |  26  |  1.34<H>    Ca    8.8      19 Aug 2021 07:02  Phos  3.4     08-19  Mg     2.4     08-19      08-15    137  |  104  |  25<H>  ----------------------------<  245<H>  4.0   |  24  |  1.62<H>    Ca    8.6      15 Aug 2021 06:18  Phos  3.2     08-15  Mg     2.3     08-15    TPro  7.1  /  Alb  3.4<L>  /  TBili  0.6  /  DBili  x   /  AST  11  /  ALT  16  /  AlkPhos  145<H>  08-15        CAPILLARY BLOOD GLUCOSE  POCT Blood Glucose.: 305 mg/dL (14 Aug 2021 16:20)  POCT Blood Glucose.: 281 mg/dL (14 Aug 2021 07:29)        MEDICATIONS  (STANDING):    MEDICATIONS  (STANDING):          RADIOLOGY & ADDITIONAL TESTS:      8/16/21: US Renal (08.16.21 @ 17:02) Normal renal ultrasound.    8/16/21: MR Foot No Cont, Right (08.16.21 @ 16:52) The exam is significantly limited by motion artifact. There is a cutaneous wound along the plantar aspect of the fifth metatarsal head with adjacent cellulitic change. There is high T2 and low T1 marrow signal within the plantar aspect of the fifth metatarsal head, consistent with osteomyelitis. There is diffuse fatty atrophy and high T2 signal of musculature, consistent with denervation. There is dorsal subcutaneous soft tissue edema.      < from: Xray Foot AP + Lateral + Oblique, Right (08.14.21 @ 09:51) >  IMPRESSION: No fracture dislocation or focal bone destruction. No unusual periosteal reaction. Joint spaces preserved. Calcaneal enthesopathy. Extensive calcification/ossification in the region of the plantar fascia . Small vessel calcification. Diffuse soft tissue swelling may reflect cellulitis. No soft tissue gas.        MICROBIOLOGY DATA:    Culture - Blood (08.14.21 @ 21:09)   Specimen Source: .Blood Blood-Venous   Culture Results: No growth to date.     Culture - Blood (08.14.21 @ 21:09)   Specimen Source: .Blood Blood-Venous   Culture Results: No growth to date.     COVID-19 David Domain Antibody (08.15.21 @ 11:30)   COVID-19 David Domain Antibody Result: >250.00    Culture - Surgical Swab (08.14.21 @ 21:08)   Specimen Source: .Surgical Swab Right foot plantar wound   Culture Results:   Moderate Streptococcus agalactiae (Group B) isolated   Group B streptococci are susceptible to ampicillin,   penicillin and cefazolin, but may be resistant to   erythromycin and clindamycin.   Recommendations for intrapartum prophylaxis for Group B   streptococci are penicillin or ampicillin.   Normal skin filiberto isolated     COVID-19 PCR . (08.14.21 @ 09:41)   COVID-19 PCR: NotDetec             Patient is seen and examined at the bed side, is afebrile. He is s/p Right 5th met ray amputation today, and tolerated the procedure well.       REVIEW OF SYSTEMS: All other review systems are negative      ALLERGIES: No Known Allergies      Vital Signs Last 24 Hrs  T(C): 36.5 (19 Aug 2021 19:00), Max: 36.8 (19 Aug 2021 13:35)  T(F): 97.7 (19 Aug 2021 19:00), Max: 98.2 (19 Aug 2021 13:35)  HR: 69 (19 Aug 2021 19:45) (65 - 76)  BP: 135/63 (19 Aug 2021 19:45) (123/61 - 155/51)  BP(mean): 83 (19 Aug 2021 19:45) (74 - 83)  RR: 20 (19 Aug 2021 19:45) (14 - 20)  SpO2: 100% (19 Aug 2021 19:45) (96% - 100%)      PHYSICAL EXAM:  GENERAL: Not in distress   CHEST/LUNG:  Not using accessory muscles   HEART: s1 and s2 present  ABDOMEN:  Nontender and  Nondistended  EXTREMITIES: Right foot bandage in placed  CNS: Awake and Alert        LABS:                        11.6   6.53  )-----------( 233      ( 19 Aug 2021 07:02 )             34.4                           12.2   6.81  )-----------( 209      ( 18 Aug 2021 06:09 )             36.5                08-19    140  |  108  |  22<H>  ----------------------------<  77  3.5   |  26  |  1.34<H>    Ca    8.8      19 Aug 2021 07:02  Phos  3.4     08-19  Mg     2.4     08-19      08-15    137  |  104  |  25<H>  ----------------------------<  245<H>  4.0   |  24  |  1.62<H>    Ca    8.6      15 Aug 2021 06:18  Phos  3.2     08-15  Mg     2.3     08-15    TPro  7.1  /  Alb  3.4<L>  /  TBili  0.6  /  DBili  x   /  AST  11  /  ALT  16  /  AlkPhos  145<H>  08-15        CAPILLARY BLOOD GLUCOSE  POCT Blood Glucose.: 305 mg/dL (14 Aug 2021 16:20)  POCT Blood Glucose.: 281 mg/dL (14 Aug 2021 07:29)        MEDICATIONS  (STANDING):    ampicillin/sulbactam  IVPB 3 Gram(s) IV Intermittent every 8 hours  atorvastatin 40 milliGRAM(s) Oral at bedtime  cyanocobalamin 1000 MICROGram(s) Oral daily  dextrose 5%. 1000 milliLiter(s) (50 mL/Hr) IV Continuous <Continuous>  dextrose 5%. 1000 milliLiter(s) (100 mL/Hr) IV Continuous <Continuous>  ergocalciferol 30404 Unit(s) Oral <User Schedule>  ferrous    sulfate 325 milliGRAM(s) Oral daily  glucagon  Injectable 1 milliGRAM(s) IntraMuscular once  insulin lispro (ADMELOG) corrective regimen sliding scale   SubCutaneous Before meals and at bedtime  lactated ringers. 1000 milliLiter(s) (75 mL/Hr) IV Continuous <Continuous>  metoprolol succinate  milliGRAM(s) Oral daily  NIFEdipine XL 60 milliGRAM(s) Oral daily  oxycodone    5 mG/acetaminophen 325 mG 1 Tablet(s) Oral every 8 hours        RADIOLOGY & ADDITIONAL TESTS:      8/16/21: US Renal (08.16.21 @ 17:02) Normal renal ultrasound.    8/16/21: MR Foot No Cont, Right (08.16.21 @ 16:52) The exam is significantly limited by motion artifact. There is a cutaneous wound along the plantar aspect of the fifth metatarsal head with adjacent cellulitic change. There is high T2 and low T1 marrow signal within the plantar aspect of the fifth metatarsal head, consistent with osteomyelitis. There is diffuse fatty atrophy and high T2 signal of musculature, consistent with denervation. There is dorsal subcutaneous soft tissue edema.      < from: Xray Foot AP + Lateral + Oblique, Right (08.14.21 @ 09:51) >  IMPRESSION: No fracture dislocation or focal bone destruction. No unusual periosteal reaction. Joint spaces preserved. Calcaneal enthesopathy. Extensive calcification/ossification in the region of the plantar fascia . Small vessel calcification. Diffuse soft tissue swelling may reflect cellulitis. No soft tissue gas.        MICROBIOLOGY DATA:    Culture - Blood (08.14.21 @ 21:09)   Specimen Source: .Blood Blood-Venous   Culture Results: No growth to date.     Culture - Blood (08.14.21 @ 21:09)   Specimen Source: .Blood Blood-Venous   Culture Results: No growth to date.     COVID-19 David Domain Antibody (08.15.21 @ 11:30)   COVID-19 David Domain Antibody Result: >250.00    Culture - Surgical Swab (08.14.21 @ 21:08)   Specimen Source: .Surgical Swab Right foot plantar wound   Culture Results:   Moderate Streptococcus agalactiae (Group B) isolated   Group B streptococci are susceptible to ampicillin,   penicillin and cefazolin, but may be resistant to   erythromycin and clindamycin.   Recommendations for intrapartum prophylaxis for Group B   streptococci are penicillin or ampicillin.   Normal skin filiberto isolated     COVID-19 PCR . (08.14.21 @ 09:41)   COVID-19 PCR: NotDetec

## 2021-08-19 NOTE — DISCHARGE NOTE PROVIDER - CARE PROVIDER_API CALL
Zak Wilkins)  Cardiovascular Disease  [address]  [place] [address]  Phone: [phone]  Fax: [phone]  Follow Up Time:    Zak Wilkins (MD)  Cardiovascular Disease  [address] [facility] [address]  [place] [address]  Phone: [phone]  Fax: [phone]  Follow Up Time:     Diego Vazquez (DPM)  Foot Surgery; [facility]  [address] [place] [address]  Phone: [phone]  Fax: [phone]  Follow Up Time:

## 2021-08-19 NOTE — PROGRESS NOTE ADULT - ASSESSMENT
1. OREN due to pre-renal azotemia due to volume depletion.  -Scr remains stable and unchanged and caution with fluids since h/o Severe AS  -urinalysis shows proteinuria; uosm and FeNa is 0.76% and spot protein to creatinine ratio is 1.9gm  - renal sonogram shows no hydronephrosis with Right kidney 12.3cm and Left kidney 11.1cm  -Adjust meds to eGFR and avoid IV Gadolinium contrast,NSAIDs, and phosphate enema.  -Monitor I/O's daily.   -Monitor SMA daily.  2. CKD stage 3 most likely due to diabetic nephropathy and hypertensive nephrosclerosis with proteinuria  -patient has mild component of ckd. presently could be his baseline. would benefit with ACE-I/ARB when renal functions is stable.  -Keep patient euvolemic and renal diet  -Avoid Nephrotoxic Meds/ Agents such as (NSAIDs, IV contrast, Aminoglycosides such as gentamicin, -Gadolinium contrast, Phosphate containing enemas, etc..)  -Adjust Medications according to eGFR  3. HTN: -bp is acceptable. on norvasc. continue bp meds  -titrate bp meds to keep sbp >110 and < 130  4. Mineral Bone Disease:  -phos is okay, Vitamin 25 OH is low and on ergo 50k weekly, and PTH intact around 60  5. Anemia rule out iron deficiency vs anemia of chronic disease vs anemia of ckd:  -iron panel noted and low iron sat, on ferrous tab daily,  folate is okay and  vitamin B12 is low and on vitamin b12 tabs.  -F/u CBC daily  -transfuse if HB < 7.0.  6. Rt foot ulcer: on unasyn and Plan as per podiatry; MRI of foot done and 5th head resection possible on thursday.    Discussed with patient in detail regarding the renal plan and care  Discussed the assessment and plan with Primary Team/Nurse

## 2021-08-19 NOTE — PROGRESS NOTE ADULT - PROBLEM SELECTOR PLAN 1
- p/w right foot ulcer and pain; non-complaint w/ medications  - PE: right foot fifth metatarsal plantar ulcer  - WBC wnl  - foot xray cellulitis  - probe to bone  - s/p vanco, zosyn  - c/w pain management, dilaudid PRN  - ID recommending Unasyn, wound culture growing GBS  - f/u MR foot with + OM  - SIMON PVR with mild peripheral vascular disease    Podiatry: plan for R 5th metatarsal head resection procedure today  C/w unasyn

## 2021-08-19 NOTE — PROGRESS NOTE ADULT - SUBJECTIVE AND OBJECTIVE BOX
C A R D I O L O G Y  **********************************     DATE OF SERVICE: 08-19-21    Patient denies chest pain or shortness of breath.   Review of symptoms otherwise negative.    ampicillin/sulbactam  IVPB 3 Gram(s) IV Intermittent every 8 hours  ampicillin/sulbactam  IVPB      aspirin  chewable 81 milliGRAM(s) Oral daily  atorvastatin 40 milliGRAM(s) Oral at bedtime  cyanocobalamin 1000 MICROGram(s) Oral daily  dextrose 40% Gel 15 Gram(s) Oral once  dextrose 5%. 1000 milliLiter(s) IV Continuous <Continuous>  dextrose 5%. 1000 milliLiter(s) IV Continuous <Continuous>  dextrose 50% Injectable 25 Gram(s) IV Push once  dextrose 50% Injectable 12.5 Gram(s) IV Push once  dextrose 50% Injectable 25 Gram(s) IV Push once  ergocalciferol 79880 Unit(s) Oral <User Schedule>  ferrous    sulfate 325 milliGRAM(s) Oral daily  glucagon  Injectable 1 milliGRAM(s) IntraMuscular once  insulin glargine Injectable (LANTUS) 7 Unit(s) SubCutaneous at bedtime  insulin lispro (ADMELOG) corrective regimen sliding scale   SubCutaneous Before meals and at bedtime  metoprolol succinate  milliGRAM(s) Oral daily  NIFEdipine XL 60 milliGRAM(s) Oral daily  oxycodone    5 mG/acetaminophen 325 mG 1 Tablet(s) Oral every 8 hours                            11.6   6.53  )-----------( 233      ( 19 Aug 2021 07:02 )             34.4       Hemoglobin: 11.6 g/dL (08-19 @ 07:02)  Hemoglobin: 12.2 g/dL (08-18 @ 06:09)  Hemoglobin: 11.9 g/dL (08-17 @ 06:27)  Hemoglobin: 12.6 g/dL (08-16 @ 13:13)  Hemoglobin: 12.2 g/dL (08-15 @ 06:18)      08-19    140  |  108  |  22<H>  ----------------------------<  77  3.5   |  26  |  1.34<H>    Ca    8.8      19 Aug 2021 07:02  Phos  3.4     08-19  Mg     2.4     08-19      Creatinine Trend: 1.34<--, 1.42<--, 1.52<--, 1.44<--, 1.62<--, 1.59<--    COAGS: PT/INR - ( 19 Aug 2021 07:02 )   PT: 12.7 sec;   INR: 1.07 ratio                   T(C): 36.6 (08-19-21 @ 05:02), Max: 36.7 (08-18-21 @ 13:20)  HR: 69 (08-19-21 @ 05:02) (65 - 74)  BP: 155/51 (08-19-21 @ 05:02) (143/63 - 172/73)  RR: 18 (08-19-21 @ 05:02) (18 - 18)  SpO2: 100% (08-19-21 @ 05:02) (96% - 100%)  Wt(kg): --    I&O's Summary    18 Aug 2021 07:01  -  19 Aug 2021 07:00  --------------------------------------------------------  IN: 240 mL / OUT: 400 mL / NET: -160 mL        HEENT:  (-)icterus (-)pallor  CV: N S1 S2 1/6 TRINIDAD (+)2 Pulses B/l  Resp:  Clear to ausculatation B/L, normal effort  GI: (+) BS Soft, NT, ND  Lymph:  (-)Edema, (-)obvious lymphadenopathy  Skin: Warm to touch, Normal turgor  Psych: Appropriate mood and affect          ASSESSMENT/PLAN: 	76y  Male ambulates and performs ADL independently, w/ PMH of CAD (s/p CABG >10yrs ago), DM, HTN, and HLD, presents with right foot pain x2 weeks. Cardiology consulted for management of cad.     - Echo noted, CINTIA of 1.0 cm moderate to severe AS, will need SABIHA and AV w/u once acute issues resolve.  HE denies CP, SOB or LOC  - no clinical CHF  - Patient is currently optimized for a low risk procedure.   Would treat as high cardiac risk.  Caution with vasodilators   - Podiatry f/u for 5th ray amputation today  - Abx per ID  - cont ASA, statin BB    Zak Wilkins MD, FACC  BEEPER [phone]     C A R D I O L O G Y  **********************************     DATE OF SERVICE: 08-19-21    Patient denies chest pain or shortness of breath.   Review of symptoms otherwise negative.    ampicillin/sulbactam  IVPB 3 Gram(s) IV Intermittent every 8 hours  ampicillin/sulbactam  IVPB      aspirin  chewable 81 milliGRAM(s) Oral daily  atorvastatin 40 milliGRAM(s) Oral at bedtime  cyanocobalamin 1000 MICROGram(s) Oral daily  dextrose 40% Gel 15 Gram(s) Oral once  dextrose 5%. 1000 milliLiter(s) IV Continuous <Continuous>  dextrose 5%. 1000 milliLiter(s) IV Continuous <Continuous>  dextrose 50% Injectable 25 Gram(s) IV Push once  dextrose 50% Injectable 12.5 Gram(s) IV Push once  dextrose 50% Injectable 25 Gram(s) IV Push once  ergocalciferol 62511 Unit(s) Oral <User Schedule>  ferrous    sulfate 325 milliGRAM(s) Oral daily  glucagon  Injectable 1 milliGRAM(s) IntraMuscular once  insulin glargine Injectable (LANTUS) 7 Unit(s) SubCutaneous at bedtime  insulin lispro (ADMELOG) corrective regimen sliding scale   SubCutaneous Before meals and at bedtime  metoprolol succinate  milliGRAM(s) Oral daily  NIFEdipine XL 60 milliGRAM(s) Oral daily  oxycodone    5 mG/acetaminophen 325 mG 1 Tablet(s) Oral every 8 hours                            11.6   6.53  )-----------( 233      ( 19 Aug 2021 07:02 )             34.4       Hemoglobin: 11.6 g/dL (08-19 @ 07:02)  Hemoglobin: 12.2 g/dL (08-18 @ 06:09)  Hemoglobin: 11.9 g/dL (08-17 @ 06:27)  Hemoglobin: 12.6 g/dL (08-16 @ 13:13)  Hemoglobin: 12.2 g/dL (08-15 @ 06:18)      08-19    140  |  108  |  22<H>  ----------------------------<  77  3.5   |  26  |  1.34<H>    Ca    8.8      19 Aug 2021 07:02  Phos  3.4     08-19  Mg     2.4     08-19      Creatinine Trend: 1.34<--, 1.42<--, 1.52<--, 1.44<--, 1.62<--, 1.59<--    COAGS: PT/INR - ( 19 Aug 2021 07:02 )   PT: 12.7 sec;   INR: 1.07 ratio                   T(C): 36.6 (08-19-21 @ 05:02), Max: 36.7 (08-18-21 @ 13:20)  HR: 69 (08-19-21 @ 05:02) (65 - 74)  BP: 155/51 (08-19-21 @ 05:02) (143/63 - 172/73)  RR: 18 (08-19-21 @ 05:02) (18 - 18)  SpO2: 100% (08-19-21 @ 05:02) (96% - 100%)  Wt(kg): --    I&O's Summary    18 Aug 2021 07:01  -  19 Aug 2021 07:00  --------------------------------------------------------  IN: 240 mL / OUT: 400 mL / NET: -160 mL        HEENT:  (-)icterus (-)pallor  CV: N S1 S2 1/6 TRINIDAD (+)2 Pulses B/l  Resp:  Clear to ausculatation B/L, normal effort  GI: (+) BS Soft, NT, ND  Lymph:  (-)Edema, (-)obvious lymphadenopathy  Skin: Warm to touch, Normal turgor  Psych: Appropriate mood and affect          ASSESSMENT/PLAN: 	76y  Male ambulates and performs ADL independently, w/ PMH of CAD (s/p CABG >10yrs ago), DM, HTN, and HLD, presents with right foot pain x2 weeks. Cardiology consulted for management of cad.     - Echo noted, CINTIA of 1.0 cm moderate to severe AS, will need SABIHA and AV w/u once acute issues resolve.  HE denies CP, SOB or LOC  - no clinical CHF  - Patient is currently optimized for a low risk procedure.   Would treat as high cardiac risk.  Caution with vasodilators   - Podiatry f/u for 5th metatarsal head resection  today  - Abx per ID  - cont ASA, statin BB    Zak Wilkins MD, FACC  BEEPER [phone]

## 2021-08-19 NOTE — DISCHARGE NOTE PROVIDER - NSDCMRMEDTOKEN_GEN_ALL_CORE_FT
amLODIPine 10 mg oral tablet: 1 tab(s) orally once a day   isosorbide mononitrate 30 mg oral tablet, extended release: 1 tab(s) orally once a day (in the morning)  Levemir 100 units/mL subcutaneous solution: 38 unit(s) subcutaneous once a day (at bedtime)  metFORMIN 500 mg oral tablet: 1 tab(s) orally once a day (at bedtime)  metoprolol succinate 200 mg oral tablet, extended release: 1  orally once a day  rosuvastatin 40 mg oral tablet: 1 tab(s) orally once a day   cyanocobalamin 1000 mcg oral tablet: 1 tab(s) orally once a day  Drisdol 1.25 mg (50,000 intl units) oral capsule: 1 cap(s) orally once a week   ferrous sulfate 325 mg (65 mg elemental iron) oral tablet: 1 tab(s) orally once a day  Levemir 100 units/mL subcutaneous solution: 38 unit(s) subcutaneous once a day (at bedtime)  metFORMIN 500 mg oral tablet: 1 tab(s) orally once a day (at bedtime)  metoprolol succinate 200 mg oral tablet, extended release: 1  orally once a day  NIFEdipine 60 mg oral tablet, extended release: 1 tab(s) orally once a day  rosuvastatin 40 mg oral tablet: 1 tab(s) orally once a day  Tylenol 325 mg oral capsule: 2 cap(s) orally every 8 hours    aspirin 81 mg oral tablet, chewable: 1 tab(s) orally once a day  cyanocobalamin 1000 mcg oral tablet: 1 tab(s) orally once a day  Drisdol 1.25 mg (50,000 intl units) oral capsule: 1 cap(s) orally once a week   ferrous sulfate 325 mg (65 mg elemental iron) oral tablet: 1 tab(s) orally once a day  Levemir 100 units/mL subcutaneous solution: 38 unit(s) subcutaneous once a day (at bedtime)  lisinopril 10 mg oral tablet: 1 tab(s) orally once a day  metFORMIN 500 mg oral tablet: 1 tab(s) orally once a day (at bedtime)  metoprolol succinate 100 mg oral tablet, extended release: 1 tab(s) orally once a day  NIFEdipine 60 mg oral tablet, extended release: 1 tab(s) orally once a day  rosuvastatin 40 mg oral tablet: 1 tab(s) orally once a day  Tylenol 325 mg oral capsule: 2 cap(s) orally every 8 hours    amoxicillin-clavulanate 875 mg-125 mg oral tablet: 1 tab(s) orally 2 times a day  aspirin 81 mg oral tablet, chewable: 1 tab(s) orally once a day  cyanocobalamin 1000 mcg oral tablet: 1 tab(s) orally once a day  Drisdol 1.25 mg (50,000 intl units) oral capsule: 1 cap(s) orally once a week   ferrous sulfate 325 mg (65 mg elemental iron) oral tablet: 1 tab(s) orally once a day  Levemir 100 units/mL subcutaneous solution: 38 unit(s) subcutaneous once a day (at bedtime)  lisinopril 10 mg oral tablet: 1 tab(s) orally once a day  metFORMIN 500 mg oral tablet: 1 tab(s) orally once a day (at bedtime)  metoprolol succinate 100 mg oral tablet, extended release: 1 tab(s) orally once a day  NIFEdipine 60 mg oral tablet, extended release: 1 tab(s) orally once a day  rosuvastatin 40 mg oral tablet: 1 tab(s) orally once a day  Tylenol 325 mg oral capsule: 2 cap(s) orally every 8 hours    amoxicillin-clavulanate 875 mg-125 mg oral tablet: 1 tab(s) orally 2 times a day  aspirin 81 mg oral tablet, chewable: 1 tab(s) orally once a day  cyanocobalamin 1000 mcg oral tablet: 1 tab(s) orally once a day  Drisdol 1.25 mg (50,000 intl units) oral capsule: 1 cap(s) orally once a week   ferrous sulfate 325 mg (65 mg elemental iron) oral tablet: 1 tab(s) orally once a day  insulin glargine: 10 unit(s) subcutaneous once a day (at bedtime)  lisinopril 10 mg oral tablet: 1 tab(s) orally once a day  metoprolol succinate 100 mg oral tablet, extended release: 1 tab(s) orally once a day  NIFEdipine 60 mg oral tablet, extended release: 1 tab(s) orally once a day  rosuvastatin 40 mg oral tablet: 1 tab(s) orally once a day  Tylenol 325 mg oral capsule: 2 cap(s) orally every 8 hours    apixaban 5 mg oral tablet: 1 tab(s) orally every 12 hours  aspirin 81 mg oral tablet, chewable: 1 tab(s) orally once a day  bisacodyl 10 mg rectal suppository: 1 suppository(ies) rectal once  collagenase 250 units/g topical ointment: 1 application topically once a day  cyanocobalamin 1000 mcg oral tablet: 1 tab(s) orally once a day  Drisdol 1.25 mg (50,000 intl units) oral capsule: 1 cap(s) orally once a week   ferrous sulfate 325 mg (65 mg elemental iron) oral tablet: 1 tab(s) orally once a day  finasteride 5 mg oral tablet: 1 tab(s) orally once a day  fluconazole: 100 milligram(s) intravenous once a day  gabapentin 100 mg oral capsule: 1 cap(s) orally 3 times a day  Lantus Solostar Pen 100 units/mL subcutaneous solution: 10 unit(s) subcutaneous once a day (at bedtime)   lisinopril 10 mg oral tablet: 1 tab(s) orally once a day  metoprolol succinate 100 mg oral tablet, extended release: 1 tab(s) orally once a day  NIFEdipine 60 mg oral tablet, extended release: 1 tab(s) orally once a day  pantoprazole 40 mg oral delayed release tablet: 1 tab(s) orally once a day (before a meal)  polyethylene glycol 3350 oral powder for reconstitution: 17 gram(s) orally once a day  rosuvastatin 40 mg oral tablet: 1 tab(s) orally once a day  senna oral tablet: 2 tab(s) orally once a day (at bedtime)  tamsulosin 0.4 mg oral capsule: 1 cap(s) orally once a day (at bedtime)

## 2021-08-19 NOTE — BRIEF OPERATIVE NOTE - NSICDXBRIEFPROCEDURE_GEN_ALL_CORE_FT
PROCEDURES:  Partial amputation of fifth ray of right foot by open approach 19-Aug-2021 19:03:30  Jeancarlos Hitchcock  Filleted toe flap 19-Aug-2021 19:04:02  Jeancarlos Hitchcock

## 2021-08-20 LAB
ALBUMIN SERPL ELPH-MCNC: 2.5 G/DL — LOW (ref 3.5–5)
ALP SERPL-CCNC: 109 U/L — SIGNIFICANT CHANGE UP (ref 40–120)
ALT FLD-CCNC: 18 U/L DA — SIGNIFICANT CHANGE UP (ref 10–60)
ANION GAP SERPL CALC-SCNC: 8 MMOL/L — SIGNIFICANT CHANGE UP (ref 5–17)
AST SERPL-CCNC: 25 U/L — SIGNIFICANT CHANGE UP (ref 10–40)
BASOPHILS # BLD AUTO: 0.03 K/UL — SIGNIFICANT CHANGE UP (ref 0–0.2)
BASOPHILS NFR BLD AUTO: 0.4 % — SIGNIFICANT CHANGE UP (ref 0–2)
BILIRUB SERPL-MCNC: 0.6 MG/DL — SIGNIFICANT CHANGE UP (ref 0.2–1.2)
BUN SERPL-MCNC: 20 MG/DL — HIGH (ref 7–18)
CALCIUM SERPL-MCNC: 8.3 MG/DL — LOW (ref 8.4–10.5)
CHLORIDE SERPL-SCNC: 108 MMOL/L — SIGNIFICANT CHANGE UP (ref 96–108)
CO2 SERPL-SCNC: 24 MMOL/L — SIGNIFICANT CHANGE UP (ref 22–31)
CREAT SERPL-MCNC: 1.3 MG/DL — SIGNIFICANT CHANGE UP (ref 0.5–1.3)
EOSINOPHIL # BLD AUTO: 0.23 K/UL — SIGNIFICANT CHANGE UP (ref 0–0.5)
EOSINOPHIL NFR BLD AUTO: 3.1 % — SIGNIFICANT CHANGE UP (ref 0–6)
GLUCOSE BLDC GLUCOMTR-MCNC: 126 MG/DL — HIGH (ref 70–99)
GLUCOSE BLDC GLUCOMTR-MCNC: 128 MG/DL — HIGH (ref 70–99)
GLUCOSE BLDC GLUCOMTR-MCNC: 133 MG/DL — HIGH (ref 70–99)
GLUCOSE BLDC GLUCOMTR-MCNC: 184 MG/DL — HIGH (ref 70–99)
GLUCOSE SERPL-MCNC: 121 MG/DL — HIGH (ref 70–99)
GRAM STN FLD: SIGNIFICANT CHANGE UP
HCT VFR BLD CALC: 33.7 % — LOW (ref 39–50)
HGB BLD-MCNC: 11.2 G/DL — LOW (ref 13–17)
IMM GRANULOCYTES NFR BLD AUTO: 0.3 % — SIGNIFICANT CHANGE UP (ref 0–1.5)
LYMPHOCYTES # BLD AUTO: 1.1 K/UL — SIGNIFICANT CHANGE UP (ref 1–3.3)
LYMPHOCYTES # BLD AUTO: 15 % — SIGNIFICANT CHANGE UP (ref 13–44)
MAGNESIUM SERPL-MCNC: 2.3 MG/DL — SIGNIFICANT CHANGE UP (ref 1.6–2.6)
MCHC RBC-ENTMCNC: 28.9 PG — SIGNIFICANT CHANGE UP (ref 27–34)
MCHC RBC-ENTMCNC: 33.2 GM/DL — SIGNIFICANT CHANGE UP (ref 32–36)
MCV RBC AUTO: 87.1 FL — SIGNIFICANT CHANGE UP (ref 80–100)
MONOCYTES # BLD AUTO: 0.43 K/UL — SIGNIFICANT CHANGE UP (ref 0–0.9)
MONOCYTES NFR BLD AUTO: 5.9 % — SIGNIFICANT CHANGE UP (ref 2–14)
NEUTROPHILS # BLD AUTO: 5.53 K/UL — SIGNIFICANT CHANGE UP (ref 1.8–7.4)
NEUTROPHILS NFR BLD AUTO: 75.3 % — SIGNIFICANT CHANGE UP (ref 43–77)
NRBC # BLD: 0 /100 WBCS — SIGNIFICANT CHANGE UP (ref 0–0)
PHOSPHATE SERPL-MCNC: 3.4 MG/DL — SIGNIFICANT CHANGE UP (ref 2.5–4.5)
PLATELET # BLD AUTO: 227 K/UL — SIGNIFICANT CHANGE UP (ref 150–400)
POTASSIUM SERPL-MCNC: 3.9 MMOL/L — SIGNIFICANT CHANGE UP (ref 3.5–5.3)
POTASSIUM SERPL-SCNC: 3.9 MMOL/L — SIGNIFICANT CHANGE UP (ref 3.5–5.3)
PROT SERPL-MCNC: 6.6 G/DL — SIGNIFICANT CHANGE UP (ref 6–8.3)
RBC # BLD: 3.87 M/UL — LOW (ref 4.2–5.8)
RBC # FLD: 12.6 % — SIGNIFICANT CHANGE UP (ref 10.3–14.5)
SODIUM SERPL-SCNC: 140 MMOL/L — SIGNIFICANT CHANGE UP (ref 135–145)
SPECIMEN SOURCE: SIGNIFICANT CHANGE UP
WBC # BLD: 7.34 K/UL — SIGNIFICANT CHANGE UP (ref 3.8–10.5)
WBC # FLD AUTO: 7.34 K/UL — SIGNIFICANT CHANGE UP (ref 3.8–10.5)

## 2021-08-20 PROCEDURE — 73630 X-RAY EXAM OF FOOT: CPT | Mod: 26,RT

## 2021-08-20 RX ORDER — NIFEDIPINE 30 MG
60 TABLET, EXTENDED RELEASE 24 HR ORAL DAILY
Refills: 0 | Status: DISCONTINUED | OUTPATIENT
Start: 2021-08-20 | End: 2021-08-20

## 2021-08-20 RX ORDER — FERROUS SULFATE 325(65) MG
1 TABLET ORAL
Qty: 30 | Refills: 0
Start: 2021-08-20 | End: 2021-09-18

## 2021-08-20 RX ORDER — HYDROMORPHONE HYDROCHLORIDE 2 MG/ML
0.5 INJECTION INTRAMUSCULAR; INTRAVENOUS; SUBCUTANEOUS ONCE
Refills: 0 | Status: DISCONTINUED | OUTPATIENT
Start: 2021-08-20 | End: 2021-08-20

## 2021-08-20 RX ORDER — PREGABALIN 225 MG/1
1 CAPSULE ORAL
Qty: 30 | Refills: 0
Start: 2021-08-20 | End: 2021-09-18

## 2021-08-20 RX ORDER — ACETAMINOPHEN 500 MG
2 TABLET ORAL
Qty: 60 | Refills: 0
Start: 2021-08-20 | End: 2021-08-29

## 2021-08-20 RX ORDER — ERGOCALCIFEROL 1.25 MG/1
1 CAPSULE ORAL
Qty: 9 | Refills: 0
Start: 2021-08-20 | End: 2021-10-18

## 2021-08-20 RX ORDER — SODIUM CHLORIDE 9 MG/ML
1000 INJECTION, SOLUTION INTRAVENOUS
Refills: 0 | Status: DISCONTINUED | OUTPATIENT
Start: 2021-08-20 | End: 2021-08-24

## 2021-08-20 RX ORDER — ACETAMINOPHEN 500 MG
1000 TABLET ORAL ONCE
Refills: 0 | Status: COMPLETED | OUTPATIENT
Start: 2021-08-20 | End: 2021-08-20

## 2021-08-20 RX ORDER — NIFEDIPINE 30 MG
1 TABLET, EXTENDED RELEASE 24 HR ORAL
Qty: 30 | Refills: 0
Start: 2021-08-20 | End: 2021-09-18

## 2021-08-20 RX ORDER — NIFEDIPINE 30 MG
60 TABLET, EXTENDED RELEASE 24 HR ORAL DAILY
Refills: 0 | Status: DISCONTINUED | OUTPATIENT
Start: 2021-08-20 | End: 2021-08-24

## 2021-08-20 RX ADMIN — Medication 60 MILLIGRAM(S): at 05:22

## 2021-08-20 RX ADMIN — OXYCODONE AND ACETAMINOPHEN 1 TABLET(S): 5; 325 TABLET ORAL at 06:00

## 2021-08-20 RX ADMIN — AMPICILLIN SODIUM AND SULBACTAM SODIUM 200 GRAM(S): 250; 125 INJECTION, POWDER, FOR SUSPENSION INTRAMUSCULAR; INTRAVENOUS at 21:45

## 2021-08-20 RX ADMIN — ATORVASTATIN CALCIUM 40 MILLIGRAM(S): 80 TABLET, FILM COATED ORAL at 21:45

## 2021-08-20 RX ADMIN — HYDROMORPHONE HYDROCHLORIDE 0.5 MILLIGRAM(S): 2 INJECTION INTRAMUSCULAR; INTRAVENOUS; SUBCUTANEOUS at 09:50

## 2021-08-20 RX ADMIN — OXYCODONE AND ACETAMINOPHEN 1 TABLET(S): 5; 325 TABLET ORAL at 15:05

## 2021-08-20 RX ADMIN — AMPICILLIN SODIUM AND SULBACTAM SODIUM 200 GRAM(S): 250; 125 INJECTION, POWDER, FOR SUSPENSION INTRAMUSCULAR; INTRAVENOUS at 05:21

## 2021-08-20 RX ADMIN — HYDROMORPHONE HYDROCHLORIDE 0.5 MILLIGRAM(S): 2 INJECTION INTRAMUSCULAR; INTRAVENOUS; SUBCUTANEOUS at 00:10

## 2021-08-20 RX ADMIN — Medication 100 MILLIGRAM(S): at 05:21

## 2021-08-20 RX ADMIN — Medication 325 MILLIGRAM(S): at 14:07

## 2021-08-20 RX ADMIN — Medication 400 MILLIGRAM(S): at 05:21

## 2021-08-20 RX ADMIN — OXYCODONE AND ACETAMINOPHEN 1 TABLET(S): 5; 325 TABLET ORAL at 14:05

## 2021-08-20 RX ADMIN — OXYCODONE AND ACETAMINOPHEN 1 TABLET(S): 5; 325 TABLET ORAL at 21:45

## 2021-08-20 RX ADMIN — Medication 1000 MILLIGRAM(S): at 06:00

## 2021-08-20 RX ADMIN — OXYCODONE AND ACETAMINOPHEN 1 TABLET(S): 5; 325 TABLET ORAL at 22:41

## 2021-08-20 RX ADMIN — PREGABALIN 1000 MICROGRAM(S): 225 CAPSULE ORAL at 14:06

## 2021-08-20 RX ADMIN — Medication 1: at 18:03

## 2021-08-20 RX ADMIN — HYDROMORPHONE HYDROCHLORIDE 0.5 MILLIGRAM(S): 2 INJECTION INTRAMUSCULAR; INTRAVENOUS; SUBCUTANEOUS at 10:05

## 2021-08-20 RX ADMIN — AMPICILLIN SODIUM AND SULBACTAM SODIUM 200 GRAM(S): 250; 125 INJECTION, POWDER, FOR SUSPENSION INTRAMUSCULAR; INTRAVENOUS at 14:07

## 2021-08-20 RX ADMIN — OXYCODONE AND ACETAMINOPHEN 1 TABLET(S): 5; 325 TABLET ORAL at 05:22

## 2021-08-20 NOTE — PROGRESS NOTE ADULT - SUBJECTIVE AND OBJECTIVE BOX
Patient is a 76y old  Male who presents with a chief complaint of diabetic ulcer (14 Aug 2021 13:11)    Podiatry Interval HPI: Patient was seen resting comfortably in bed. Patient is AAOx3. Patient states he feels a little pain to the R foot but is tolerable. Pt denies any overnight events. Denies N/V/F/C/SOB. No other pedal complaints.     Podiatry HPI: Podiatry consulted regarding 77 yo male patient who presents to ED with a chief complaint of right foot plantar ulcer. Patient states that he noticed the ulcer about 3 weeks ago. Denies any trauma or injury. He recalls stepping on a dog treat and isn't sure if that caused an opening on the bottom of his right foot. Patient states that he has been diagnosed with diabetes for a long time. Patient reports that the ulcer became painful and can hardly ambulate due to pain. Pt rates the pain 10/10 on the VAS. Denies other pedal complaints. Denies constitutional symptoms of N/V/C/F/Sob.     HPI:  Patient is a 76yoM, ambulates and performs ADL independently, w/ PMH of CAD (s/p CABG >10yrs ago), DM, HTN, and HLD, presents with right foot pain x2 weeks. He reports 10/10, progressive, intermittent, sharp, non-radiating, right foot pain. He states stepping on a dog treat approximately 3 weeks ago, which he notice a development of wound at the time. Right foot pain developed a week later. It was mild initially, but progressive has gotten worsen. Patient has been non-compliant with medications for the past 2 months, because he has been feeling great. He denies fever, chills, n/v, dizziness, chest pain, sob, abdominal pain, urinary or bowel movement changes.  (14 Aug 2021 13:11)        Medications ampicillin/sulbactam  IVPB 3 Gram(s) IV Intermittent every 8 hours  atorvastatin 40 milliGRAM(s) Oral at bedtime  cyanocobalamin 1000 MICROGram(s) Oral daily  dextrose 5%. 1000 milliLiter(s) IV Continuous <Continuous>  dextrose 5%. 1000 milliLiter(s) IV Continuous <Continuous>  ergocalciferol 57934 Unit(s) Oral <User Schedule>  ferrous    sulfate 325 milliGRAM(s) Oral daily  glucagon  Injectable 1 milliGRAM(s) IntraMuscular once  insulin lispro (ADMELOG) corrective regimen sliding scale   SubCutaneous Before meals and at bedtime  lactated ringers. 1000 milliLiter(s) IV Continuous <Continuous>  metoprolol succinate  milliGRAM(s) Oral daily  NIFEdipine XL 60 milliGRAM(s) Oral daily  oxycodone    5 mG/acetaminophen 325 mG 1 Tablet(s) Oral every 8 hours    FHFamily history of acute myocardial infarction (Father)    Family history of diabetes mellitus (Mother, Sibling)    Family history of hypertension (Mother, Sibling)    Family history of hyperlipidemia (Mother, Sibling)    ,   PMHHTN (hypertension)    HLD (hyperlipidemia)    DM (diabetes mellitus)    CAD (coronary artery disease)    Glaucoma, angle-closure       PSHNo significant past surgical history    S/P CABG (coronary artery bypass graft)    History of thyroid surgery        Labs                          11.2   7.34  )-----------( 227      ( 20 Aug 2021 06:08 )             33.7      08-20    140  |  108  |  20<H>  ----------------------------<  121<H>  3.9   |  24  |  1.30    Ca    8.3<L>      20 Aug 2021 06:08  Phos  3.4     08-20  Mg     2.3     08-20    TPro  6.6  /  Alb  2.5<L>  /  TBili  0.6  /  DBili  x   /  AST  25  /  ALT  18  /  AlkPhos  109  08-20     Vital Signs Last 24 Hrs  T(C): 37.1 (20 Aug 2021 05:12), Max: 37.1 (20 Aug 2021 05:12)  T(F): 98.8 (20 Aug 2021 05:12), Max: 98.8 (20 Aug 2021 05:12)  HR: 75 (20 Aug 2021 05:12) (65 - 76)  BP: 161/77 (20 Aug 2021 05:12) (123/61 - 161/77)  BP(mean): 83 (19 Aug 2021 19:45) (74 - 83)  RR: 18 (20 Aug 2021 05:12) (14 - 20)  SpO2: 99% (20 Aug 2021 05:12) (96% - 100%)  Sedimentation Rate, Erythrocyte: 44 mm/Hr (08-15-21 @ 06:18)         C-Reactive Protein, Serum: 67 mg/L (08-15-21 @ 11:55)   WBC Count: 7.34 K/uL (08-20-21 @ 06:08)      ROS: All others negative unless otherwise stated in the HPI      PHYSICAL EXAM  GEN: SHER ROBERT is a pleasant well-nourished, well developed 76y Male in no acute distress, alert awake, and oriented to person, place and time.   LE Focused:    Vasc: DP/PT 2/4 on the left, 1/4 on the right, CFT brisk to all digits, TG warm to warm on the LLE, localized edema and erythema noted to R surgical site consistent with post op course.   Derm: Incision site located to R 5th digit. Sutures well adhered. No wound dehiscence noted to surgical site. Sanguinous drainage noted to bandage. No clinical signs of infection present.   Neuro: Protective sensation and epicritic sensation grossly diminished b/l  MSK: Pain localized to the lateral aspect of the right forefoot, able to move extremities in all compartments         Imaging:   Xray  EXAM:  XR FOOT COMP MIN 3 VIEWS RT                          PROCEDURE DATE:  08/14/2021      INTERPRETATION:  Diabetic foot ulcer cellulitis.    3 views right foot.    IMPRESSION: No fracture dislocation or focal bone destruction. No unusual periosteal reaction. Joint spaces preserved. Calcaneal enthesopathy. Extensive calcification/ossification in the region of the plantar fascia . Small vessel calcification. Diffuse soft tissue swelling may reflect cellulitis. No soft tissue gas.      MRI  EXAM:  MR FOOT RT                          PROCEDURE DATE:  08/16/2021      INTERPRETATION:  EXAMINATION: MRI of the right forefoot without contrast    CLINICAL INFORMATION: Right foot ulcer. Evaluate for osteomyelitis.    TECHNIQUE: Multiplanar, multisequential MR imaging was performed.    FINDINGS: The exam is significantly limited by motion artifact. There is a cutaneous wound along the plantar aspect of the fifth metatarsal head with adjacent cellulitic change. There is high T2 and low T1 marrow signal within the plantar aspect of the fifth metatarsal head, consistent with osteomyelitis. There is diffuse fatty atrophy and high T2 signal of musculature, consistent with denervation. There is dorsal subcutaneous soft tissue edema.    IMPRESSION: Limited exam, as above. Cutaneous wound along the plantar aspect of the fifth digit with adjacent osteomyelitis of the fifth metatarsal head.        SIMON  EXAM:  US PHYSIOL LWR EXT 3+ LEV BI                            PROCEDURE DATE:  08/16/2021          INTERPRETATION:  Clinical indication: Diabetes with right foot ulcer    COMPARISON: None    FINDINGS: There are biphasic waveforms bilaterally, dampened at the level of the metatarsals. No abnormal segmental pressure gradient.    Right SIMON = 0.82  Left SIMON = 0.85    IMPRESSION: ABIs compatible with mild peripheral vascular disease.          Microbiology  Culture Results:   Moderate Streptococcus agalactiae (Group B) isolated   Group B streptococci are susceptible to ampicillin,   penicillin and cefazolin, but may be resistant to   erythromycin and clindamycin.   Recommendations for intrapartum prophylaxis for Group B   streptococci are penicillin or ampicillin.   Normal skin filiberto isolated (08.14.21 @ 21:08)    Patient is a 76y old  Male who presents with a chief complaint of diabetic ulcer (14 Aug 2021 13:11)    Podiatry Interval HPI: Patient was seen resting comfortably in bed. Patient is AAOx3. S/p R fifth partial ray amputation. Patient states he feels a little pain to the R foot but is tolerable. Pt denies any overnight events. Denies N/V/F/C/SOB. No other pedal complaints.     Podiatry HPI: Podiatry consulted regarding 75 yo male patient who presents to ED with a chief complaint of right foot plantar ulcer. Patient states that he noticed the ulcer about 3 weeks ago. Denies any trauma or injury. He recalls stepping on a dog treat and isn't sure if that caused an opening on the bottom of his right foot. Patient states that he has been diagnosed with diabetes for a long time. Patient reports that the ulcer became painful and can hardly ambulate due to pain. Pt rates the pain 10/10 on the VAS. Denies other pedal complaints. Denies constitutional symptoms of N/V/C/F/Sob.     HPI:  Patient is a 76yoM, ambulates and performs ADL independently, w/ PMH of CAD (s/p CABG >10yrs ago), DM, HTN, and HLD, presents with right foot pain x2 weeks. He reports 10/10, progressive, intermittent, sharp, non-radiating, right foot pain. He states stepping on a dog treat approximately 3 weeks ago, which he notice a development of wound at the time. Right foot pain developed a week later. It was mild initially, but progressive has gotten worsen. Patient has been non-compliant with medications for the past 2 months, because he has been feeling great. He denies fever, chills, n/v, dizziness, chest pain, sob, abdominal pain, urinary or bowel movement changes.  (14 Aug 2021 13:11)        Medications ampicillin/sulbactam  IVPB 3 Gram(s) IV Intermittent every 8 hours  atorvastatin 40 milliGRAM(s) Oral at bedtime  cyanocobalamin 1000 MICROGram(s) Oral daily  dextrose 5%. 1000 milliLiter(s) IV Continuous <Continuous>  dextrose 5%. 1000 milliLiter(s) IV Continuous <Continuous>  ergocalciferol 96049 Unit(s) Oral <User Schedule>  ferrous    sulfate 325 milliGRAM(s) Oral daily  glucagon  Injectable 1 milliGRAM(s) IntraMuscular once  insulin lispro (ADMELOG) corrective regimen sliding scale   SubCutaneous Before meals and at bedtime  lactated ringers. 1000 milliLiter(s) IV Continuous <Continuous>  metoprolol succinate  milliGRAM(s) Oral daily  NIFEdipine XL 60 milliGRAM(s) Oral daily  oxycodone    5 mG/acetaminophen 325 mG 1 Tablet(s) Oral every 8 hours    FHFamily history of acute myocardial infarction (Father)    Family history of diabetes mellitus (Mother, Sibling)    Family history of hypertension (Mother, Sibling)    Family history of hyperlipidemia (Mother, Sibling)    ,   PMHHTN (hypertension)    HLD (hyperlipidemia)    DM (diabetes mellitus)    CAD (coronary artery disease)    Glaucoma, angle-closure       PSHNo significant past surgical history    S/P CABG (coronary artery bypass graft)    History of thyroid surgery        Labs                          11.2   7.34  )-----------( 227      ( 20 Aug 2021 06:08 )             33.7      08-20    140  |  108  |  20<H>  ----------------------------<  121<H>  3.9   |  24  |  1.30    Ca    8.3<L>      20 Aug 2021 06:08  Phos  3.4     08-20  Mg     2.3     08-20    TPro  6.6  /  Alb  2.5<L>  /  TBili  0.6  /  DBili  x   /  AST  25  /  ALT  18  /  AlkPhos  109  08-20     Vital Signs Last 24 Hrs  T(C): 37.1 (20 Aug 2021 05:12), Max: 37.1 (20 Aug 2021 05:12)  T(F): 98.8 (20 Aug 2021 05:12), Max: 98.8 (20 Aug 2021 05:12)  HR: 75 (20 Aug 2021 05:12) (65 - 76)  BP: 161/77 (20 Aug 2021 05:12) (123/61 - 161/77)  BP(mean): 83 (19 Aug 2021 19:45) (74 - 83)  RR: 18 (20 Aug 2021 05:12) (14 - 20)  SpO2: 99% (20 Aug 2021 05:12) (96% - 100%)  Sedimentation Rate, Erythrocyte: 44 mm/Hr (08-15-21 @ 06:18)         C-Reactive Protein, Serum: 67 mg/L (08-15-21 @ 11:55)   WBC Count: 7.34 K/uL (08-20-21 @ 06:08)      ROS: All others negative unless otherwise stated in the HPI      PHYSICAL EXAM  GEN: SHRE ROBERT is a pleasant well-nourished, well developed 76y Male in no acute distress, alert awake, and oriented to person, place and time.   LE Focused:    Vasc: DP/PT 2/4 on the left, 1/4 on the right, CFT brisk to all digits, TG warm to warm on the LLE, localized edema and erythema noted to R surgical site consistent with post op course.   Derm: Incision site located to R 5th digit. Sutures well adhered. No wound dehiscence noted to surgical site. Sanguinous drainage noted to bandage. No clinical signs of infection present.   Neuro: Protective sensation and epicritic sensation grossly diminished b/l  MSK: Pain localized to the lateral aspect of the right forefoot, able to move extremities in all compartments         Imaging:   Xray  EXAM:  XR FOOT COMP MIN 3 VIEWS RT                          PROCEDURE DATE:  08/14/2021      INTERPRETATION:  Diabetic foot ulcer cellulitis.    3 views right foot.    IMPRESSION: No fracture dislocation or focal bone destruction. No unusual periosteal reaction. Joint spaces preserved. Calcaneal enthesopathy. Extensive calcification/ossification in the region of the plantar fascia . Small vessel calcification. Diffuse soft tissue swelling may reflect cellulitis. No soft tissue gas.      MRI  EXAM:  MR FOOT RT                          PROCEDURE DATE:  08/16/2021      INTERPRETATION:  EXAMINATION: MRI of the right forefoot without contrast    CLINICAL INFORMATION: Right foot ulcer. Evaluate for osteomyelitis.    TECHNIQUE: Multiplanar, multisequential MR imaging was performed.    FINDINGS: The exam is significantly limited by motion artifact. There is a cutaneous wound along the plantar aspect of the fifth metatarsal head with adjacent cellulitic change. There is high T2 and low T1 marrow signal within the plantar aspect of the fifth metatarsal head, consistent with osteomyelitis. There is diffuse fatty atrophy and high T2 signal of musculature, consistent with denervation. There is dorsal subcutaneous soft tissue edema.    IMPRESSION: Limited exam, as above. Cutaneous wound along the plantar aspect of the fifth digit with adjacent osteomyelitis of the fifth metatarsal head.        SIMON  EXAM:  US PHYSIOL LWR EXT 3+ LEV BI                            PROCEDURE DATE:  08/16/2021          INTERPRETATION:  Clinical indication: Diabetes with right foot ulcer    COMPARISON: None    FINDINGS: There are biphasic waveforms bilaterally, dampened at the level of the metatarsals. No abnormal segmental pressure gradient.    Right SIMON = 0.82  Left SIMON = 0.85    IMPRESSION: ABIs compatible with mild peripheral vascular disease.          Microbiology  Culture Results:   Moderate Streptococcus agalactiae (Group B) isolated   Group B streptococci are susceptible to ampicillin,   penicillin and cefazolin, but may be resistant to   erythromycin and clindamycin.   Recommendations for intrapartum prophylaxis for Group B   streptococci are penicillin or ampicillin.   Normal skin filiberto isolated (08.14.21 @ 21:08)

## 2021-08-20 NOTE — PROGRESS NOTE ADULT - SUBJECTIVE AND OBJECTIVE BOX
PGY-1 Progress Note discussed with attending    PAGER #: [[phone]] TILL 5:00 PM  PLEASE CONTACT ON CALL TEAM:  - On Call Team (Please refer to Dashawn) FROM 5:00 PM - 8:30PM  - Nightfloat Team FROM 8:30 -7:30 AM    INTERVAL HPI  - Patient is a 77 y/o M (ambulates and ADL independently) w PMH of CAD (s/p CABG >10yrs ago), DM, HTN, and HLD, presents with right foot pain x2 weeks admitted for diabetic right foot ulcer. Patient was evaluated by infectious disease and started on IV unasyn for foot ulcer (wound culture growing GBS), XR showed cellulitis and MRI with osteomyelitis. Patient was evaluated by podiatry who performed right 5th metatarsal head resection. Patient underwent SIMON/PVR as per vascular evaluation recommendations which showed mild PVD. Patient also noted to have OREN, nephrology was consulted and recommended starting ACEI once Cr improves given significant proteinuria. Paitent was evaluated by cardiology given history of CAD, TTE showed G1DD and moderate to severe AS with recommendation for outpatient SABIHA. For HTN, patient's antihypertensives were titrated as tolerated, isosorbide was held and patient's amlodipine was discontinued, started on nifedipine ER 60 mg and BP was well controlled prior to discharge.    OVERNIGHT EVENTS:   -  Patient evalauted at bedside, complaining of foot s/p resection. No other complaints    REVIEW OF SYSTEMS:  CONSTITUTIONAL: No fever, weight loss, or fatigue  RESPIRATORY: No cough, wheezing, chills or hemoptysis; No shortness of breath  CARDIOVASCULAR: No chest pain, palpitations, dizziness, or leg swelling  GASTROINTESTINAL: No abdominal pain. No nausea, vomiting, or hematemesis; No diarrhea or constipation. No melena or hematochezia.  GENITOURINARY: No dysuria or hematuria, urinary frequency  NEUROLOGICAL: No headaches, memory loss, loss of strength, numbness, or tremors  SKIN: No itching, burning, rashes, or lesions     MEDICATIONS  (STANDING):  ampicillin/sulbactam  IVPB 3 Gram(s) IV Intermittent every 8 hours  atorvastatin 40 milliGRAM(s) Oral at bedtime  cyanocobalamin 1000 MICROGram(s) Oral daily  dextrose 5%. 1000 milliLiter(s) (50 mL/Hr) IV Continuous <Continuous>  dextrose 5%. 1000 milliLiter(s) (100 mL/Hr) IV Continuous <Continuous>  ergocalciferol 70923 Unit(s) Oral <User Schedule>  ferrous    sulfate 325 milliGRAM(s) Oral daily  glucagon  Injectable 1 milliGRAM(s) IntraMuscular once  insulin lispro (ADMELOG) corrective regimen sliding scale   SubCutaneous Before meals and at bedtime  lactated ringers. 1000 milliLiter(s) (75 mL/Hr) IV Continuous <Continuous>  metoprolol succinate  milliGRAM(s) Oral daily  NIFEdipine XL 60 milliGRAM(s) Oral daily  oxycodone    5 mG/acetaminophen 325 mG 1 Tablet(s) Oral every 8 hours    MEDICATIONS  (PRN):      Vital Signs Last 24 Hrs  T(C): 37.1 (20 Aug 2021 05:12), Max: 37.1 (20 Aug 2021 05:12)  T(F): 98.8 (20 Aug 2021 05:12), Max: 98.8 (20 Aug 2021 05:12)  HR: 68 (20 Aug 2021 09:35) (65 - 76)  BP: 156/69 (20 Aug 2021 09:35) (123/61 - 161/77)  BP(mean): 83 (19 Aug 2021 19:45) (74 - 83)  RR: 18 (20 Aug 2021 05:12) (14 - 20)  SpO2: 98% (20 Aug 2021 09:35) (96% - 100%)    PHYSICAL EXAMINATION:  GENERAL: NAD, AAOx3  HEAD: AT/NC  EYES: conjunctiva and sclera clear  NECK: supple, No JVD noted, Normal thyroid  CHEST/LUNG: CTABL; no rales, rhonchi, wheezing, or rubs  HEART: regular rate and rhythm; no murmurs, rubs, or gallops  ABDOMEN: soft, nontender, nondistended; Bowel sounds present  EXTREMITIES:  2+ Peripheral Pulses, No clubbing, cyanosis, or edema  SKIN: Incision site located to R 5th digit. Sutures well adhered. No wound dehiscence noted to surgical site. Sanguinous drainage noted to bandage. No clinical signs of infection present.                             11.2   7.34  )-----------( 227      ( 20 Aug 2021 06:08 )             33.7     08-20    140  |  108  |  20<H>  ----------------------------<  121<H>  3.9   |  24  |  1.30    Ca    8.3<L>      20 Aug 2021 06:08  Phos  3.4     08-20  Mg     2.3     08-20    TPro  6.6  /  Alb  2.5<L>  /  TBili  0.6  /  DBili  x   /  AST  25  /  ALT  18  /  AlkPhos  109  08-20    LIVER FUNCTIONS - ( 20 Aug 2021 06:08 )  Alb: 2.5 g/dL / Pro: 6.6 g/dL / ALK PHOS: 109 U/L / ALT: 18 U/L DA / AST: 25 U/L / GGT: x               PT/INR - ( 19 Aug 2021 07:02 )   PT: 12.7 sec;   INR: 1.07 ratio           COVID-19 PCR: NotDetec (18 Aug 2021 18:23)  COVID-19 PCR: NotDetec (14 Aug 2021 09:41)      CAPILLARY BLOOD GLUCOSE      POCT Blood Glucose.: 126 mg/dL (20 Aug 2021 08:39)  POCT Blood Glucose.: 147 mg/dL (19 Aug 2021 21:59)  POCT Blood Glucose.: 115 mg/dL (19 Aug 2021 19:15)  POCT Blood Glucose.: 105 mg/dL (19 Aug 2021 16:57)  POCT Blood Glucose.: 87 mg/dL (19 Aug 2021 11:20)      RADIOLOGY & ADDITIONAL TESTS:

## 2021-08-20 NOTE — PROGRESS NOTE ADULT - PROBLEM SELECTOR PLAN 2
SCr 1.34 today, improving. 1.59 on admission, no baseline available per chart    On IV hydration  Dr. Sosa consulted SCr 1.30 today, improving. 1.59 on admission, no baseline available per chart    On IV hydration  Dr. Sosa consulted

## 2021-08-20 NOTE — PROGRESS NOTE ADULT - ASSESSMENT
Assessment  s/p R 5th partial ray amputation (8/19)   Osteomyelitis R 5th digit   Diabetic ulcer R sub 5th met  DM Type II        Plan  Patient evaluated and chart created  MRI reviewed - shows OM of the right 5th met   Dressing removed and incision site evaluated   Dressed surgical site with adaptic, gauze, ABD, rebecca, ACE   Pt to be nonWB to R foot  Keep dressing clean, dry and intact    Continue antibiotics per ID recommendation   Surgical pathology pending   Upon discharge, please follow up at [phone] [facility] 2nd floor [address] [phone]   Discussed with Dr. Vazquez and seen bedside with Dr. Lara

## 2021-08-20 NOTE — PROGRESS NOTE ADULT - PROBLEM SELECTOR PLAN 6
- h/o HLD, non-compliant w/ medications  - c/w statin  - f/u lipid panel - h/o HLD, non-compliant w/ medications  - c/w statin  -Lipid panel WNL

## 2021-08-20 NOTE — PROGRESS NOTE ADULT - SUBJECTIVE AND OBJECTIVE BOX
Patient is seen and examined at the bed side, is afebrile. He is s/p Right 5th met ray amputation today, and tolerated the procedure well.       REVIEW OF SYSTEMS: All other review systems are negative      ALLERGIES: No Known Allergies      Vital Signs Last 24 Hrs  T(C): 36.8 (20 Aug 2021 14:27), Max: 37.1 (20 Aug 2021 05:12)  T(F): 98.2 (20 Aug 2021 14:27), Max: 98.8 (20 Aug 2021 05:12)  HR: 72 (20 Aug 2021 14:27) (67 - 75)  BP: 162/56 (20 Aug 2021 14:27) (128/61 - 162/56)  BP(mean): 83 (19 Aug 2021 19:45) (77 - 83)  RR: 17 (20 Aug 2021 14:27) (14 - 20)  SpO2: 98% (20 Aug 2021 14:27) (98% - 100%)      PHYSICAL EXAM:  GENERAL: Not in distress   CHEST/LUNG:  Not using accessory muscles   HEART: s1 and s2 present  ABDOMEN:  Nontender and  Nondistended  EXTREMITIES: Right foot bandage in placed  CNS: Awake and Alert        LABS:                        11.2   7.34  )-----------( 227      ( 20 Aug 2021 06:08 )             33.7                           11.6   6.53  )-----------( 233      ( 19 Aug 2021 07:02 )             34.4                    08-20    140  |  108  |  20<H>  ----------------------------<  121<H>  3.9   |  24  |  1.30    Ca    8.3<L>      20 Aug 2021 06:08  Phos  3.4     08-20  Mg     2.3     08-20    TPro  6.6  /  Alb  2.5<L>  /  TBili  0.6  /  DBili  x   /  AST  25  /  ALT  18  /  AlkPhos  109  08-20      08-15    137  |  104  |  25<H>  ----------------------------<  245<H>  4.0   |  24  |  1.62<H>    Ca    8.6      15 Aug 2021 06:18  Phos  3.2     08-15  Mg     2.3     08-15    TPro  7.1  /  Alb  3.4<L>  /  TBili  0.6  /  DBili  x   /  AST  11  /  ALT  16  /  AlkPhos  145<H>  08-15        CAPILLARY BLOOD GLUCOSE  POCT Blood Glucose.: 305 mg/dL (14 Aug 2021 16:20)  POCT Blood Glucose.: 281 mg/dL (14 Aug 2021 07:29)        MEDICATIONS  (STANDING):    ampicillin/sulbactam  IVPB 3 Gram(s) IV Intermittent every 8 hours  atorvastatin 40 milliGRAM(s) Oral at bedtime  cyanocobalamin 1000 MICROGram(s) Oral daily  dextrose 5%. 1000 milliLiter(s) (50 mL/Hr) IV Continuous <Continuous>  dextrose 5%. 1000 milliLiter(s) (100 mL/Hr) IV Continuous <Continuous>  ergocalciferol 75101 Unit(s) Oral <User Schedule>  ferrous    sulfate 325 milliGRAM(s) Oral daily  glucagon  Injectable 1 milliGRAM(s) IntraMuscular once  insulin lispro (ADMELOG) corrective regimen sliding scale   SubCutaneous Before meals and at bedtime  lactated ringers. 1000 milliLiter(s) (75 mL/Hr) IV Continuous <Continuous>  metoprolol succinate  milliGRAM(s) Oral daily  NIFEdipine XL 60 milliGRAM(s) Oral daily  oxycodone    5 mG/acetaminophen 325 mG 1 Tablet(s) Oral every 8 hours        RADIOLOGY & ADDITIONAL TESTS:    8/16/21: US Renal (08.16.21 @ 17:02) Normal renal ultrasound.    8/16/21: MR Foot No Cont, Right (08.16.21 @ 16:52) The exam is significantly limited by motion artifact. There is a cutaneous wound along the plantar aspect of the fifth metatarsal head with adjacent cellulitic change. There is high T2 and low T1 marrow signal within the plantar aspect of the fifth metatarsal head, consistent with osteomyelitis. There is diffuse fatty atrophy and high T2 signal of musculature, consistent with denervation. There is dorsal subcutaneous soft tissue edema.      < from: Xray Foot AP + Lateral + Oblique, Right (08.14.21 @ 09:51) >  IMPRESSION: No fracture dislocation or focal bone destruction. No unusual periosteal reaction. Joint spaces preserved. Calcaneal enthesopathy. Extensive calcification/ossification in the region of the plantar fascia . Small vessel calcification. Diffuse soft tissue swelling may reflect cellulitis. No soft tissue gas.        MICROBIOLOGY DATA:    Culture - Blood (08.14.21 @ 21:09)   Specimen Source: .Blood Blood-Venous   Culture Results: No growth to date.     Culture - Blood (08.14.21 @ 21:09)   Specimen Source: .Blood Blood-Venous   Culture Results: No growth to date.     COVID-19 David Domain Antibody (08.15.21 @ 11:30)   COVID-19 David Domain Antibody Result: >250.00    Culture - Surgical Swab (08.14.21 @ 21:08)   Specimen Source: .Surgical Swab Right foot plantar wound   Culture Results:   Moderate Streptococcus agalactiae (Group B) isolated   Group B streptococci are susceptible to ampicillin,   penicillin and cefazolin, but may be resistant to   erythromycin and clindamycin.   Recommendations for intrapartum prophylaxis for Group B   streptococci are penicillin or ampicillin.   Normal skin filiberto isolated     COVID-19 PCR . (08.14.21 @ 09:41)   COVID-19 PCR: NotDetec                 Patient is seen and examined at the bed side, is afebrile. He is doing better except has some pain.       REVIEW OF SYSTEMS: All other review systems are negative      ALLERGIES: No Known Allergies      Vital Signs Last 24 Hrs  T(C): 36.8 (20 Aug 2021 14:27), Max: 37.1 (20 Aug 2021 05:12)  T(F): 98.2 (20 Aug 2021 14:27), Max: 98.8 (20 Aug 2021 05:12)  HR: 72 (20 Aug 2021 14:27) (67 - 75)  BP: 162/56 (20 Aug 2021 14:27) (128/61 - 162/56)  BP(mean): 83 (19 Aug 2021 19:45) (77 - 83)  RR: 17 (20 Aug 2021 14:27) (14 - 20)  SpO2: 98% (20 Aug 2021 14:27) (98% - 100%)      PHYSICAL EXAM:  GENERAL: Not in distress   CHEST/LUNG:  Not using accessory muscles   HEART: s1 and s2 present  ABDOMEN:  Nontender and  Nondistended  EXTREMITIES: Right foot bandage in placed  CNS: Awake and Alert        LABS:                        11.2   7.34  )-----------( 227      ( 20 Aug 2021 06:08 )             33.7                           11.6   6.53  )-----------( 233      ( 19 Aug 2021 07:02 )             34.4                    08-20    140  |  108  |  20<H>  ----------------------------<  121<H>  3.9   |  24  |  1.30    Ca    8.3<L>      20 Aug 2021 06:08  Phos  3.4     08-20  Mg     2.3     08-20    TPro  6.6  /  Alb  2.5<L>  /  TBili  0.6  /  DBili  x   /  AST  25  /  ALT  18  /  AlkPhos  109  08-20      08-15    137  |  104  |  25<H>  ----------------------------<  245<H>  4.0   |  24  |  1.62<H>    Ca    8.6      15 Aug 2021 06:18  Phos  3.2     08-15  Mg     2.3     08-15    TPro  7.1  /  Alb  3.4<L>  /  TBili  0.6  /  DBili  x   /  AST  11  /  ALT  16  /  AlkPhos  145<H>  08-15        CAPILLARY BLOOD GLUCOSE  POCT Blood Glucose.: 305 mg/dL (14 Aug 2021 16:20)  POCT Blood Glucose.: 281 mg/dL (14 Aug 2021 07:29)        MEDICATIONS  (STANDING):    ampicillin/sulbactam  IVPB 3 Gram(s) IV Intermittent every 8 hours  atorvastatin 40 milliGRAM(s) Oral at bedtime  cyanocobalamin 1000 MICROGram(s) Oral daily  dextrose 5%. 1000 milliLiter(s) (50 mL/Hr) IV Continuous <Continuous>  dextrose 5%. 1000 milliLiter(s) (100 mL/Hr) IV Continuous <Continuous>  ergocalciferol 38778 Unit(s) Oral <User Schedule>  ferrous    sulfate 325 milliGRAM(s) Oral daily  glucagon  Injectable 1 milliGRAM(s) IntraMuscular once  insulin lispro (ADMELOG) corrective regimen sliding scale   SubCutaneous Before meals and at bedtime  lactated ringers. 1000 milliLiter(s) (75 mL/Hr) IV Continuous <Continuous>  metoprolol succinate  milliGRAM(s) Oral daily  NIFEdipine XL 60 milliGRAM(s) Oral daily  oxycodone    5 mG/acetaminophen 325 mG 1 Tablet(s) Oral every 8 hours        RADIOLOGY & ADDITIONAL TESTS:    8/16/21: US Renal (08.16.21 @ 17:02) Normal renal ultrasound.    8/16/21: MR Foot No Cont, Right (08.16.21 @ 16:52) The exam is significantly limited by motion artifact. There is a cutaneous wound along the plantar aspect of the fifth metatarsal head with adjacent cellulitic change. There is high T2 and low T1 marrow signal within the plantar aspect of the fifth metatarsal head, consistent with osteomyelitis. There is diffuse fatty atrophy and high T2 signal of musculature, consistent with denervation. There is dorsal subcutaneous soft tissue edema.      < from: Xray Foot AP + Lateral + Oblique, Right (08.14.21 @ 09:51) >  IMPRESSION: No fracture dislocation or focal bone destruction. No unusual periosteal reaction. Joint spaces preserved. Calcaneal enthesopathy. Extensive calcification/ossification in the region of the plantar fascia . Small vessel calcification. Diffuse soft tissue swelling may reflect cellulitis. No soft tissue gas.        MICROBIOLOGY DATA:    Culture - Blood (08.14.21 @ 21:09)   Specimen Source: .Blood Blood-Venous   Culture Results: No growth to date.     Culture - Blood (08.14.21 @ 21:09)   Specimen Source: .Blood Blood-Venous   Culture Results: No growth to date.     COVID-19 David Domain Antibody (08.15.21 @ 11:30)   COVID-19 David Domain Antibody Result: >250.00    Culture - Surgical Swab (08.14.21 @ 21:08)   Specimen Source: .Surgical Swab Right foot plantar wound   Culture Results:   Moderate Streptococcus agalactiae (Group B) isolated   Group B streptococci are susceptible to ampicillin,   penicillin and cefazolin, but may be resistant to   erythromycin and clindamycin.   Recommendations for intrapartum prophylaxis for Group B   streptococci are penicillin or ampicillin.   Normal skin filiberto isolated     COVID-19 PCR . (08.14.21 @ 09:41)   COVID-19 PCR: NotDetec

## 2021-08-20 NOTE — PROGRESS NOTE ADULT - PROBLEM SELECTOR PLAN 4
- h/o T2DM, non-compliant w/ medications  - c/w sliding scale  - f/u a1c - h/o T2DM, non-compliant w/ medications  - c/w sliding scale  -Hba1c 11.4

## 2021-08-20 NOTE — PROGRESS NOTE ADULT - PROBLEM SELECTOR PLAN 3
- h/o CAD (s/p CABG >10yr ago), non-compliant w/ medications  - EKG showed NSTEMI, but no changes from jan2021 EKG  - trop 0.22>0.22  - c/w ASA, statin, metoprolol    TTE with G1dd, moderate to severe AS  - Cardio, Dr. Wilkins, consulted and recommending SABIHA - h/o CAD (s/p CABG >10yr ago), non-compliant w/ medications  - EKG showed NSTEMI, but no changes from jan2021 EKG  - trop 0.22>0.22  - c/w ASA, statin, metoprolol, held d/t procedure podiatry     TTE with G1dd, moderate to severe AS  - Cardio, Dr. Wilkins, consulted and recommending SABIHA, EF 55-60% - h/o CAD (s/p CABG >10yr ago), non-compliant w/ medications  - EKG showed NSTEMI, but no changes from jan2021 EKG  - trop 0.22>0.22  - c/w ASA, statin, metoprolol, held d/t procedure podiatry     TTE with G1dd, moderate to severe AS  - Cardio, Dr. Wilkins consulted SABIHA, EF 55-60%  -recommending SABIHA and AV w/u once acute issues resolve.

## 2021-08-20 NOTE — PROGRESS NOTE ADULT - ASSESSMENT
1. OREN due to pre-renal azotemia due to volume depletion.  -Scr remains stable and unchanged and caution with fluids since h/o Severe AS  -urinalysis shows proteinuria; uosm and FeNa is 0.76% and spot protein to creatinine ratio is 1.9gm  - renal sonogram shows no hydronephrosis with Right kidney 12.3cm and Left kidney 11.1cm  -Adjust meds to eGFR and avoid IV Gadolinium contrast,NSAIDs, and phosphate enema.  -Monitor I/O's daily.   -Monitor SMA daily.  2. CKD stage 3 most likely due to diabetic nephropathy and hypertensive nephrosclerosis with proteinuria  -patient has mild component of ckd. presently could be his baseline. would benefit with ACE-I/ARB when renal functions is stable.  -Keep patient euvolemic and renal diet  -Avoid Nephrotoxic Meds/ Agents such as (NSAIDs, IV contrast, Aminoglycosides such as gentamicin, -Gadolinium contrast, Phosphate containing enemas, etc..)  -Adjust Medications according to eGFR  3. HTN: -bp is acceptable. on norvasc. continue bp meds  -titrate bp meds to keep sbp >110 and < 130  4. Mineral Bone Disease:  -phos is okay, Vitamin 25 OH is low and on ergo 50k weekly, and PTH intact around 60  5. Anemia due to iron deficiency,  -iron panel noted and low iron sat, on ferrous tab daily,  folate is okay and  vitamin B12 is low and on vitamin b12 tabs.  -F/u CBC daily  -transfuse if HB < 7.0.  6. Rt foot ulcer: on unasyn and Plan as per podiatry; MRI of foot done and 5th toe head resection s/p 8/19.    Discussed with patient in detail regarding the renal plan and care  Discussed the assessment and plan with Primary Team/Nurse 1. OREN due to pre-renal azotemia due to volume depletion.  -Scr remains stable and unchanged and caution with fluids since h/o Severe AS  -urinalysis shows proteinuria; uosm and FeNa is 0.76% and spot protein to creatinine ratio is 1.9gm  - renal sonogram shows no hydronephrosis with Right kidney 12.3cm and Left kidney 11.1cm  -Adjust meds to eGFR and avoid IV Gadolinium contrast,NSAIDs, and phosphate enema.  -Monitor I/O's daily.   -Monitor SMA daily.  2. CKD stage 3 most likely due to diabetic nephropathy and hypertensive nephrosclerosis with proteinuria  -patient has mild component of ckd. presently could be his baseline. would benefit with ACE-I/ARB when renal functions is stable.  -Keep patient euvolemic and renal diet  -Avoid Nephrotoxic Meds/ Agents such as (NSAIDs, IV contrast, Aminoglycosides such as gentamicin, -Gadolinium contrast, Phosphate containing enemas, etc..)  -Adjust Medications according to eGFR  3. HTN: -bp is acceptable. on norvasc. continue bp meds  -titrate bp meds to keep sbp >110 and < 130  4. Mineral Bone Disease:  -phos is okay, Vitamin 25 OH is low and on ergo 50k weekly, and PTH intact around 60  5. Anemia due to iron deficiency,  -iron panel noted and low iron sat, on ferrous tab daily,  folate is okay and  vitamin B12 is low and on vitamin b12 tabs.  -F/u CBC daily  -transfuse if HB < 7.0.  6. Rt foot ulcer: on unasyn and Plan as per podiatry; MRI of foot done and 5th toe head resection s/p 8/19.    will be off service for the weekend and will be covered by Dr. Knott.    Discussed with patient in detail regarding the renal plan and care  Discussed the assessment and plan with Primary Team/Nurse

## 2021-08-20 NOTE — PROGRESS NOTE ADULT - ASSESSMENT
Patient is a 76y old  Male who ambulates and performs ADL independently, w/ PMH of CAD (s/p CABG >10yrs ago), DM, HTN, and HLD, now presents to the ER for evaluation of right foot pain x2 weeks.  He states stepping on a dog treat approximately 3 weeks ago, which he notice a development of wound at the time. Right foot pain developed a week later. It was mild initially, but progressive has gotten worsen. Patient has been non-compliant with medications for the past 2 months, because he has been feeling great. ON admission, he found to have No fever but tachycardia. He has started on Unasyn and Vancomycin, and the ID consult requested to assist with further evaluation and antibiotic management.    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 Follow up intra-op culture  3. Continue Unasyn since wound culture grew Streptococcus agalactiae  4. Monitor kidney function  5. Wound care as per Podiatry     Attending Attestation:    Spent more than 35 minutes on total encounter, more than 50 % of the visit was spent counseling and/or coordinating care by the Attending physician. Patient is a 76y old  Male who ambulates and performs ADL independently, w/ PMH of CAD (s/p CABG >10yrs ago), DM, HTN, and HLD, now presents to the ER for evaluation of right foot pain x2 weeks.  He states stepping on a dog treat approximately 3 weeks ago, which he notice a development of wound at the time. Right foot pain developed a week later. It was mild initially, but progressive has gotten worsen. Patient has been non-compliant with medications for the past 2 months, because he has been feeling great. ON admission, he found to have No fever but tachycardia. He has started on Unasyn and Vancomycin, and the ID consult requested to assist with further evaluation and antibiotic management.    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 Follow up intra-op culture and pathology result  3. Continue Unasyn since wound culture grew Streptococcus agalactiae  4. Monitor kidney function  5. Wound care as per Podiatry     d/w House staff     Attending Attestation:    Spent more than 35 minutes on total encounter, more than 50 % of the visit was spent counseling and/or coordinating care by the Attending physician.

## 2021-08-20 NOTE — PHYSICAL THERAPY INITIAL EVALUATION ADULT - GENERAL OBSERVATIONS, REHAB EVAL
Pt seen supine in bed w/IV, (+) ace bandage of right ankle, c/o pain with mobility. Pt is cooperative during assessment

## 2021-08-20 NOTE — PROGRESS NOTE ADULT - PROBLEM SELECTOR PLAN 5
- h/o HTN, non-compliant w/ medications; previously on metoprolol 200mg qd, [id number] qd, and isosorbide 30mg qd    Patient with hypertension overnight to 190/70 on metoprolol 50 mg daily  Restarted amlodipine 10 mg qd and metoprolol at 100 mg daily  Patient still hypertensive to 170s today, increase amlodipine 10 mg to procardia XL 60 mg daily  Monitor BP  Nephro recommending ACEI/ARB once Cr improves - h/o HTN, non-compliant w/ medications; previously on metoprolol 200mg qd, [id number] qd, and isosorbide 30mg qd      c/w nifedipine 60 mg qd and metoprolol at 100 mg daily  Nephro recommending ACEI/ARB once Cr improves

## 2021-08-20 NOTE — PHYSICAL THERAPY INITIAL EVALUATION ADULT - LONG TERM MEMORY, REHAB EVAL
Subjective:   Sandra presents today because of congestion and cough.  Over the last week he has had increasing shortness of breath.  He coughs a lot especially at nighttime.  Cough is productive of a yellow phlegm.  He has been having some headaches.  He has had associated fevers.  Temperature is been up over 100 at home.  He is complaining of left ear pain.  This worsens at nighttime.  He will cough into a gag and then have some vomiting.  He is had a couple episodes of diarrhea no one else in the household is ill.      Objective:  HEENT: Conjunctiva clear.  The nasal mucosa is boggy.  Decreased airflow noted through the nares.  The right TM was totally normal.  Left TM was bulging and erythematous.  Increased vasculature noted over the umbo.  No perforation or exudate present.  Pharynx revealed no obvious erythema or exudate.  Neck: Neck reveals mild posterior adenopathy over the left posterior cervical triangle.  No anterior adenopathy present.  Lungs: There are bronchial sounds heard anteriorly.  A few expiratory wheezes heard with forced expiration.  No rales are present.  Patient was in no respiratory distress during my exam.  Cardiac: No murmur heard.  There was a regular rhythm present.  Skin: No rashes are present.      Assessment:  1.  Upper respiratory infection consistent with bronchitis  2.  Left otitis media      Plan:  Start amoxicillin 400 mg 3 times daily.  The patient wishes to have liquid.  Use Tylenol for pain control.  Follow-up here as needed.  
Detail Level: Zone
intact
Detail Level: Simple
Detail Level: Detailed

## 2021-08-20 NOTE — PROGRESS NOTE ADULT - PROBLEM SELECTOR PLAN 1
- p/w right foot ulcer and pain; non-complaint w/ medications  - PE: right foot fifth metatarsal plantar ulcer  - WBC wnl  - foot xray cellulitis  - probe to bone  - s/p vancjocelyn ramirezsyn  - c/w pain management, dilaudid PRN  - ID recommending Unasyn, wound culture growing GBS  - MR foot with + OM  - SIMON PVR with mild peripheral vascular disease    R 5th metatarsal head resection procedure completed by podiatry, Incision site located to R 5th digit. Sutures well adhered. No wound dehiscence noted to surgical site. Sanguinous drainage noted to bandage. No clinical signs of infection present.   C/w unasyn - p/w right foot ulcer and pain; non-complaint w/ medications  - PE: right foot fifth metatarsal plantar ulcer  - WBC wnl  - foot xray cellulitis  - probe to bone  - s/p vancjocelyn ramirezsyn  - c/w pain management, dilaudid PRN  - ID recommending Unasyn, wound culture growing GBS  - MR foot with + OM  - SIMON PVR with mild peripheral vascular disease    R 5th metatarsal head resection procedure completed by podiatry, Incision site located to R 5th digit. Sutures well adhered. No wound dehiscence noted to surgical site. Sanguinous drainage noted to bandage. No clinical signs of infection present.   surgical pathology collected   C/w unasyn - p/w right foot ulcer and pain; non-complaint w/ medications  - PE: right foot fifth metatarsal plantar ulcer  - WBC wnl  - foot xray cellulitis  - probe to bone  - s/p vanco zosyn  - c/w pain management, dilaudid PRN  - ID recommending Unasyn, wound culture growing GBS  - MR foot with + OM  - SIMON PVR with mild peripheral vascular disease    R 5th metatarsal head resection procedure completed by podiatry, Incision site located to R 5th digit. Sutures well adhered. No wound dehiscence noted to surgical site. Sanguinous drainage noted to bandage. No clinical signs of infection present.   surgical pathology collected   C/w unasyn  Will switch to PO Abx once results of surgical pathology are back

## 2021-08-20 NOTE — PHYSICAL THERAPY INITIAL EVALUATION ADULT - GAIT DEVIATIONS NOTED, PT EVAL
decreased heather/increased time in double stance/decreased velocity of limb motion/decreased step length

## 2021-08-20 NOTE — PROGRESS NOTE ADULT - SUBJECTIVE AND OBJECTIVE BOX
C A R D I O L O G Y  **********************************     DATE OF SERVICE: 08-20-21    Patient denies chest pain or shortness of breath.   Review of symptoms otherwise negative.    ampicillin/sulbactam  IVPB 3 Gram(s) IV Intermittent every 8 hours  atorvastatin 40 milliGRAM(s) Oral at bedtime  cyanocobalamin 1000 MICROGram(s) Oral daily  dextrose 5%. 1000 milliLiter(s) IV Continuous <Continuous>  dextrose 5%. 1000 milliLiter(s) IV Continuous <Continuous>  ergocalciferol 07683 Unit(s) Oral <User Schedule>  ferrous    sulfate 325 milliGRAM(s) Oral daily  glucagon  Injectable 1 milliGRAM(s) IntraMuscular once  insulin lispro (ADMELOG) corrective regimen sliding scale   SubCutaneous Before meals and at bedtime  lactated ringers. 1000 milliLiter(s) IV Continuous <Continuous>  metoprolol succinate  milliGRAM(s) Oral daily  NIFEdipine XL 60 milliGRAM(s) Oral daily  oxycodone    5 mG/acetaminophen 325 mG 1 Tablet(s) Oral every 8 hours                            11.2   7.34  )-----------( 227      ( 20 Aug 2021 06:08 )             33.7       Hemoglobin: 11.2 g/dL (08-20 @ 06:08)  Hemoglobin: 11.6 g/dL (08-19 @ 07:02)  Hemoglobin: 12.2 g/dL (08-18 @ 06:09)  Hemoglobin: 11.9 g/dL (08-17 @ 06:27)  Hemoglobin: 12.6 g/dL (08-16 @ 13:13)      08-20    140  |  108  |  20<H>  ----------------------------<  121<H>  3.9   |  24  |  1.30    Ca    8.3<L>      20 Aug 2021 06:08  Phos  3.4     08-20  Mg     2.3     08-20    TPro  6.6  /  Alb  2.5<L>  /  TBili  0.6  /  DBili  x   /  AST  25  /  ALT  18  /  AlkPhos  109  08-20    Creatinine Trend: 1.30<--, 1.34<--, 1.42<--, 1.52<--, 1.44<--, 1.62<--    COAGS:           T(C): 37.1 (08-20-21 @ 05:12), Max: 37.1 (08-20-21 @ 05:12)  HR: 68 (08-20-21 @ 09:35) (65 - 76)  BP: 156/69 (08-20-21 @ 09:35) (123/61 - 161/77)  RR: 18 (08-20-21 @ 05:12) (14 - 20)  SpO2: 98% (08-20-21 @ 09:35) (96% - 100%)  Wt(kg): --    I&O's Summary    19 Aug 2021 07:01  -  20 Aug 2021 07:00  --------------------------------------------------------  IN: 240 mL / OUT: 400 mL / NET: -160 mL          HEENT:  (-)icterus (-)pallor  CV: N S1 S2 1/6 TRINIDAD (+)2 Pulses B/l  Resp:  Clear to ausculatation B/L, normal effort  GI: (+) BS Soft, NT, ND  Lymph:  (-)Edema, (-)obvious lymphadenopathy  Skin: Warm to touch, Normal turgor  Psych: Appropriate mood and affect          ASSESSMENT/PLAN: 	76y  Male ambulates and performs ADL independently, w/ PMH of CAD (s/p CABG >10yrs ago), DM, HTN, and HLD, presents with right foot pain x2 weeks. Cardiology consulted for management of cad s/p 5th partial ray amputation.    - tolerated procedure  - Echo noted, CINTIA of 1.0 cm moderate to severe AS, will need SABIHA and AV w/u once acute issues resolve.  He denies CP, SOB or LOC  - no clinical CHF  - Abx per ID  - cont ASA, statin BB    Zak Wilkins MD, [facility]  BEEPER [phone]

## 2021-08-20 NOTE — PROGRESS NOTE ADULT - SUBJECTIVE AND OBJECTIVE BOX
NEPHROLOGY MEDICAL CARE, [facility] - Dr. Tariq Herrera/ Dr. Lauren Lopez/ Dr. Dre Call/ Dr. Destiny Knott    Patient was seen and examined at bedside.    CC: patient is okay.    Vital Signs Last 24 Hrs  T(C): 37.1 (20 Aug 2021 05:12), Max: 37.1 (20 Aug 2021 05:12)  T(F): 98.8 (20 Aug 2021 05:12), Max: 98.8 (20 Aug 2021 05:12)  HR: 68 (20 Aug 2021 09:35) (65 - 76)  BP: 156/69 (20 Aug 2021 09:35) (123/61 - 161/77)  BP(mean): 83 (19 Aug 2021 19:45) (74 - 83)  RR: 18 (20 Aug 2021 05:12) (14 - 20)  SpO2: 98% (20 Aug 2021 09:35) (96% - 100%)    08-19 @ 07:01  -  08-20 @ 07:00  --------------------------------------------------------  IN: 240 mL / OUT: 400 mL / NET: -160 mL        PHYSICAL EXAM:  General: No acute respiratory distress.  Eyes: conjunctiva and sclera clear  ENMT: Atraumatic, Normocephalic, supple, No JVD present. Moist mucous membranes  Respiratory: Clear to percussion bilaterally; No rales, rhonchi, wheezing  Cardiovasular: S1S2+; no r/g; systolic murmur  Gastrointestinal: Soft, Non-tender, Nondistended; Bowel sounds present  Neuro:  Awake, Alert & Oriented X3  Ext:  No edema, No Cyanosis; Rt foot bandage.  Skin: No visible rashes    MEDICATIONS:  MEDICATIONS  (STANDING):  ampicillin/sulbactam  IVPB 3 Gram(s) IV Intermittent every 8 hours  atorvastatin 40 milliGRAM(s) Oral at bedtime  cyanocobalamin 1000 MICROGram(s) Oral daily  dextrose 5%. 1000 milliLiter(s) (50 mL/Hr) IV Continuous <Continuous>  dextrose 5%. 1000 milliLiter(s) (100 mL/Hr) IV Continuous <Continuous>  ergocalciferol 64246 Unit(s) Oral <User Schedule>  ferrous    sulfate 325 milliGRAM(s) Oral daily  glucagon  Injectable 1 milliGRAM(s) IntraMuscular once  insulin lispro (ADMELOG) corrective regimen sliding scale   SubCutaneous Before meals and at bedtime  lactated ringers. 1000 milliLiter(s) (75 mL/Hr) IV Continuous <Continuous>  metoprolol succinate  milliGRAM(s) Oral daily  NIFEdipine XL 60 milliGRAM(s) Oral daily  oxycodone    5 mG/acetaminophen 325 mG 1 Tablet(s) Oral every 8 hours    MEDICATIONS  (PRN):          LABS:                        11.2   7.34  )-----------( 227      ( 20 Aug 2021 06:08 )             33.7     08-20    140  |  108  |  20<H>  ----------------------------<  121<H>  3.9   |  24  |  1.30    Ca    8.3<L>      20 Aug 2021 06:08  Phos  3.4     08-20  Mg     2.3     08-20    TPro  6.6  /  Alb  2.5<L>  /  TBili  0.6  /  DBili  x   /  AST  25  /  ALT  18  /  AlkPhos  109  08-20    PT/INR - ( 19 Aug 2021 07:02 )   PT: 12.7 sec;   INR: 1.07 ratio             Magnesium, Serum: 2.3 mg/dL (08-20 @ 06:08)  Phosphorus Level, Serum: 3.4 mg/dL (08-20 @ 06:08)    Urine studies    PTH and Vit D:

## 2021-08-20 NOTE — PHYSICAL THERAPY INITIAL EVALUATION ADULT - CRITERIA FOR SKILLED THERAPEUTIC INTERVENTIONS
STEVAN pending progress/impairments found/functional limitations in following categories/risk reduction/prevention/anticipated discharge recommendation

## 2021-08-21 LAB
ALBUMIN SERPL ELPH-MCNC: 2.5 G/DL — LOW (ref 3.5–5)
ALP SERPL-CCNC: 113 U/L — SIGNIFICANT CHANGE UP (ref 40–120)
ALT FLD-CCNC: 21 U/L DA — SIGNIFICANT CHANGE UP (ref 10–60)
ANION GAP SERPL CALC-SCNC: 7 MMOL/L — SIGNIFICANT CHANGE UP (ref 5–17)
AST SERPL-CCNC: 28 U/L — SIGNIFICANT CHANGE UP (ref 10–40)
BILIRUB SERPL-MCNC: 0.6 MG/DL — SIGNIFICANT CHANGE UP (ref 0.2–1.2)
BUN SERPL-MCNC: 20 MG/DL — HIGH (ref 7–18)
CALCIUM SERPL-MCNC: 8.4 MG/DL — SIGNIFICANT CHANGE UP (ref 8.4–10.5)
CHLORIDE SERPL-SCNC: 107 MMOL/L — SIGNIFICANT CHANGE UP (ref 96–108)
CO2 SERPL-SCNC: 25 MMOL/L — SIGNIFICANT CHANGE UP (ref 22–31)
CREAT SERPL-MCNC: 1.37 MG/DL — HIGH (ref 0.5–1.3)
GLUCOSE BLDC GLUCOMTR-MCNC: 122 MG/DL — HIGH (ref 70–99)
GLUCOSE BLDC GLUCOMTR-MCNC: 134 MG/DL — HIGH (ref 70–99)
GLUCOSE BLDC GLUCOMTR-MCNC: 192 MG/DL — HIGH (ref 70–99)
GLUCOSE BLDC GLUCOMTR-MCNC: 216 MG/DL — HIGH (ref 70–99)
GLUCOSE SERPL-MCNC: 118 MG/DL — HIGH (ref 70–99)
HCT VFR BLD CALC: 34.3 % — LOW (ref 39–50)
HGB BLD-MCNC: 11.5 G/DL — LOW (ref 13–17)
MAGNESIUM SERPL-MCNC: 2.3 MG/DL — SIGNIFICANT CHANGE UP (ref 1.6–2.6)
MCHC RBC-ENTMCNC: 29.2 PG — SIGNIFICANT CHANGE UP (ref 27–34)
MCHC RBC-ENTMCNC: 33.5 GM/DL — SIGNIFICANT CHANGE UP (ref 32–36)
MCV RBC AUTO: 87.1 FL — SIGNIFICANT CHANGE UP (ref 80–100)
NRBC # BLD: 0 /100 WBCS — SIGNIFICANT CHANGE UP (ref 0–0)
PHOSPHATE SERPL-MCNC: 3.1 MG/DL — SIGNIFICANT CHANGE UP (ref 2.5–4.5)
PLATELET # BLD AUTO: 234 K/UL — SIGNIFICANT CHANGE UP (ref 150–400)
POTASSIUM SERPL-MCNC: 3.7 MMOL/L — SIGNIFICANT CHANGE UP (ref 3.5–5.3)
POTASSIUM SERPL-SCNC: 3.7 MMOL/L — SIGNIFICANT CHANGE UP (ref 3.5–5.3)
PROT SERPL-MCNC: 6.9 G/DL — SIGNIFICANT CHANGE UP (ref 6–8.3)
RBC # BLD: 3.94 M/UL — LOW (ref 4.2–5.8)
RBC # FLD: 12.3 % — SIGNIFICANT CHANGE UP (ref 10.3–14.5)
SODIUM SERPL-SCNC: 139 MMOL/L — SIGNIFICANT CHANGE UP (ref 135–145)
WBC # BLD: 6.91 K/UL — SIGNIFICANT CHANGE UP (ref 3.8–10.5)
WBC # FLD AUTO: 6.91 K/UL — SIGNIFICANT CHANGE UP (ref 3.8–10.5)

## 2021-08-21 RX ORDER — HYDROMORPHONE HYDROCHLORIDE 2 MG/ML
0.5 INJECTION INTRAMUSCULAR; INTRAVENOUS; SUBCUTANEOUS ONCE
Refills: 0 | Status: DISCONTINUED | OUTPATIENT
Start: 2021-08-21 | End: 2021-08-21

## 2021-08-21 RX ORDER — ACETAMINOPHEN 500 MG
1000 TABLET ORAL ONCE
Refills: 0 | Status: COMPLETED | OUTPATIENT
Start: 2021-08-21 | End: 2021-08-21

## 2021-08-21 RX ADMIN — ATORVASTATIN CALCIUM 40 MILLIGRAM(S): 80 TABLET, FILM COATED ORAL at 21:43

## 2021-08-21 RX ADMIN — OXYCODONE AND ACETAMINOPHEN 1 TABLET(S): 5; 325 TABLET ORAL at 21:43

## 2021-08-21 RX ADMIN — Medication 400 MILLIGRAM(S): at 04:10

## 2021-08-21 RX ADMIN — Medication 2: at 21:42

## 2021-08-21 RX ADMIN — AMPICILLIN SODIUM AND SULBACTAM SODIUM 200 GRAM(S): 250; 125 INJECTION, POWDER, FOR SUSPENSION INTRAMUSCULAR; INTRAVENOUS at 21:42

## 2021-08-21 RX ADMIN — AMPICILLIN SODIUM AND SULBACTAM SODIUM 200 GRAM(S): 250; 125 INJECTION, POWDER, FOR SUSPENSION INTRAMUSCULAR; INTRAVENOUS at 06:29

## 2021-08-21 RX ADMIN — Medication 1000 MILLIGRAM(S): at 05:04

## 2021-08-21 RX ADMIN — OXYCODONE AND ACETAMINOPHEN 1 TABLET(S): 5; 325 TABLET ORAL at 22:15

## 2021-08-21 RX ADMIN — PREGABALIN 1000 MICROGRAM(S): 225 CAPSULE ORAL at 11:50

## 2021-08-21 RX ADMIN — Medication 100 MILLIGRAM(S): at 06:29

## 2021-08-21 RX ADMIN — HYDROMORPHONE HYDROCHLORIDE 0.5 MILLIGRAM(S): 2 INJECTION INTRAMUSCULAR; INTRAVENOUS; SUBCUTANEOUS at 14:38

## 2021-08-21 RX ADMIN — Medication 325 MILLIGRAM(S): at 11:50

## 2021-08-21 RX ADMIN — OXYCODONE AND ACETAMINOPHEN 1 TABLET(S): 5; 325 TABLET ORAL at 13:20

## 2021-08-21 RX ADMIN — OXYCODONE AND ACETAMINOPHEN 1 TABLET(S): 5; 325 TABLET ORAL at 14:09

## 2021-08-21 RX ADMIN — OXYCODONE AND ACETAMINOPHEN 1 TABLET(S): 5; 325 TABLET ORAL at 05:04

## 2021-08-21 RX ADMIN — AMPICILLIN SODIUM AND SULBACTAM SODIUM 200 GRAM(S): 250; 125 INJECTION, POWDER, FOR SUSPENSION INTRAMUSCULAR; INTRAVENOUS at 13:20

## 2021-08-21 RX ADMIN — Medication 1: at 08:30

## 2021-08-21 RX ADMIN — Medication 60 MILLIGRAM(S): at 05:05

## 2021-08-21 RX ADMIN — OXYCODONE AND ACETAMINOPHEN 1 TABLET(S): 5; 325 TABLET ORAL at 06:29

## 2021-08-21 RX ADMIN — HYDROMORPHONE HYDROCHLORIDE 0.5 MILLIGRAM(S): 2 INJECTION INTRAMUSCULAR; INTRAVENOUS; SUBCUTANEOUS at 14:53

## 2021-08-21 NOTE — PROGRESS NOTE ADULT - PROBLEM SELECTOR PLAN 5
- h/o HTN, non-compliant w/ medications; previously on metoprolol 200mg qd, [id number] qd, and isosorbide 30mg qd      c/w nifedipine 60 mg qd and metoprolol at 100 mg daily  Nephro recommending ACEI/ARB once Cr improves

## 2021-08-21 NOTE — PROGRESS NOTE ADULT - PROBLEM SELECTOR PLAN 2
SCr 1.30 today, improving. 1.59 on admission, no baseline available per chart    Cr stable    Dr. Sosa consulted

## 2021-08-21 NOTE — PROGRESS NOTE ADULT - ASSESSMENT
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 Vazquez

## 2021-08-21 NOTE — PROGRESS NOTE ADULT - PROBLEM SELECTOR PLAN 3
- h/o CAD (s/p CABG >10yr ago), non-compliant w/ medications  - EKG showed NSTEMI, but no changes from jan2021 EKG  - trop 0.22>0.22  - c/w ASA, statin, metoprolol, held d/t procedure podiatry     TTE with G1dd, moderate to severe AS  - Cardio, Dr. Wilkins consulted SABIHA, EF 55-60%  -recommending SABIHA and AV w/u once acute issues resolve.

## 2021-08-21 NOTE — PROGRESS NOTE ADULT - SUBJECTIVE AND OBJECTIVE BOX
Patient is seen and examined at the bed side, is afebrile. He is doing better except has some pain.       REVIEW OF SYSTEMS: All other review systems are negative      ALLERGIES: No Known Allergies      Vital Signs Last 24 Hrs  T(C): 37.4 (21 Aug 2021 13:47), Max: 37.5 (21 Aug 2021 04:58)  T(F): 99.3 (21 Aug 2021 13:47), Max: 99.5 (21 Aug 2021 04:58)  HR: 68 (21 Aug 2021 13:47) (68 - 72)  BP: 147/65 (21 Aug 2021 13:47) (126/42 - 158/60)  BP(mean): --  RR: 16 (21 Aug 2021 13:47) (16 - 18)  SpO2: 97% (21 Aug 2021 13:47) (97% - 97%)      PHYSICAL EXAM:  GENERAL: Not in distress   CHEST/LUNG:  Not using accessory muscles   HEART: s1 and s2 present  ABDOMEN:  Nontender and  Nondistended  EXTREMITIES: Right foot bandage in placed  CNS: Awake and Alert        LABS:                        11.5   6.91  )-----------( 234      ( 21 Aug 2021 06:24 )             34.3                           11.2   7.34  )-----------( 227      ( 20 Aug 2021 06:08 )             33.7       08-21    139  |  107  |  20<H>  ----------------------------<  118<H>  3.7   |  25  |  1.37<H>    Ca    8.4      21 Aug 2021 06:24  Phos  3.1     08-21  Mg     2.3     08-21    TPro  6.9  /  Alb  2.5<L>  /  TBili  0.6  /  DBili  x   /  AST  28  /  ALT  21  /  AlkPhos  113  08-21      08-15    137  |  104  |  25<H>  ----------------------------<  245<H>  4.0   |  24  |  1.62<H>    Ca    8.6      15 Aug 2021 06:18  Phos  3.2     08-15  Mg     2.3     08-15    TPro  7.1  /  Alb  3.4<L>  /  TBili  0.6  /  DBili  x   /  AST  11  /  ALT  16  /  AlkPhos  145<H>  08-15        CAPILLARY BLOOD GLUCOSE  POCT Blood Glucose.: 305 mg/dL (14 Aug 2021 16:20)  POCT Blood Glucose.: 281 mg/dL (14 Aug 2021 07:29)        MEDICATIONS  (STANDING):    ampicillin/sulbactam  IVPB 3 Gram(s) IV Intermittent every 8 hours  atorvastatin 40 milliGRAM(s) Oral at bedtime  cyanocobalamin 1000 MICROGram(s) Oral daily  dextrose 5%. 1000 milliLiter(s) (50 mL/Hr) IV Continuous <Continuous>  dextrose 5%. 1000 milliLiter(s) (100 mL/Hr) IV Continuous <Continuous>  ergocalciferol 00300 Unit(s) Oral <User Schedule>  ferrous    sulfate 325 milliGRAM(s) Oral daily  glucagon  Injectable 1 milliGRAM(s) IntraMuscular once  insulin lispro (ADMELOG) corrective regimen sliding scale   SubCutaneous Before meals and at bedtime  lactated ringers. 1000 milliLiter(s) (75 mL/Hr) IV Continuous <Continuous>  metoprolol succinate  milliGRAM(s) Oral daily  NIFEdipine XL 60 milliGRAM(s) Oral daily  oxycodone    5 mG/acetaminophen 325 mG 1 Tablet(s) Oral every 8 hours        RADIOLOGY & ADDITIONAL TESTS:    8/16/21: US Renal (08.16.21 @ 17:02) Normal renal ultrasound.    8/16/21: MR Foot No Cont, Right (08.16.21 @ 16:52) The exam is significantly limited by motion artifact. There is a cutaneous wound along the plantar aspect of the fifth metatarsal head with adjacent cellulitic change. There is high T2 and low T1 marrow signal within the plantar aspect of the fifth metatarsal head, consistent with osteomyelitis. There is diffuse fatty atrophy and high T2 signal of musculature, consistent with denervation. There is dorsal subcutaneous soft tissue edema.      < from: Xray Foot AP + Lateral + Oblique, Right (08.14.21 @ 09:51) >  IMPRESSION: No fracture dislocation or focal bone destruction. No unusual periosteal reaction. Joint spaces preserved. Calcaneal enthesopathy. Extensive calcification/ossification in the region of the plantar fascia . Small vessel calcification. Diffuse soft tissue swelling may reflect cellulitis. No soft tissue gas.        MICROBIOLOGY DATA:    Culture - Blood (08.14.21 @ 21:09)   Specimen Source: .Blood Blood-Venous   Culture Results: No growth to date.     Culture - Blood (08.14.21 @ 21:09)   Specimen Source: .Blood Blood-Venous   Culture Results: No growth to date.     COVID-19 David Domain Antibody (08.15.21 @ 11:30)   COVID-19 David Domain Antibody Result: >250.00    Culture - Surgical Swab (08.14.21 @ 21:08)   Specimen Source: .Surgical Swab Right foot plantar wound   Culture Results:   Moderate Streptococcus agalactiae (Group B) isolated   Group B streptococci are susceptible to ampicillin,   penicillin and cefazolin, but may be resistant to   erythromycin and clindamycin.   Recommendations for intrapartum prophylaxis for Group B   streptococci are penicillin or ampicillin.   Normal skin filiberto isolated     COVID-19 PCR . (08.14.21 @ 09:41)   COVID-19 PCR: NotDetec                 Patient is seen and examined at the bed side, is afebrile. The intra-op culture has no growth to date.       REVIEW OF SYSTEMS: All other review systems are negative      ALLERGIES: No Known Allergies      Vital Signs Last 24 Hrs  T(C): 37.4 (21 Aug 2021 13:47), Max: 37.5 (21 Aug 2021 04:58)  T(F): 99.3 (21 Aug 2021 13:47), Max: 99.5 (21 Aug 2021 04:58)  HR: 68 (21 Aug 2021 13:47) (68 - 72)  BP: 147/65 (21 Aug 2021 13:47) (126/42 - 158/60)  BP(mean): --  RR: 16 (21 Aug 2021 13:47) (16 - 18)  SpO2: 97% (21 Aug 2021 13:47) (97% - 97%)      PHYSICAL EXAM:  GENERAL: Not in distress   CHEST/LUNG:  Not using accessory muscles   HEART: s1 and s2 present  ABDOMEN:  Nontender and  Nondistended  EXTREMITIES: Right foot bandage in placed  CNS: Awake and Alert        LABS:                        11.5   6.91  )-----------( 234      ( 21 Aug 2021 06:24 )             34.3                           11.2   7.34  )-----------( 227      ( 20 Aug 2021 06:08 )             33.7       08-21    139  |  107  |  20<H>  ----------------------------<  118<H>  3.7   |  25  |  1.37<H>    Ca    8.4      21 Aug 2021 06:24  Phos  3.1     08-21  Mg     2.3     08-21    TPro  6.9  /  Alb  2.5<L>  /  TBili  0.6  /  DBili  x   /  AST  28  /  ALT  21  /  AlkPhos  113  08-21      08-15    137  |  104  |  25<H>  ----------------------------<  245<H>  4.0   |  24  |  1.62<H>    Ca    8.6      15 Aug 2021 06:18  Phos  3.2     08-15  Mg     2.3     08-15    TPro  7.1  /  Alb  3.4<L>  /  TBili  0.6  /  DBili  x   /  AST  11  /  ALT  16  /  AlkPhos  145<H>  08-15        CAPILLARY BLOOD GLUCOSE  POCT Blood Glucose.: 305 mg/dL (14 Aug 2021 16:20)  POCT Blood Glucose.: 281 mg/dL (14 Aug 2021 07:29)        MEDICATIONS  (STANDING):    ampicillin/sulbactam  IVPB 3 Gram(s) IV Intermittent every 8 hours  atorvastatin 40 milliGRAM(s) Oral at bedtime  cyanocobalamin 1000 MICROGram(s) Oral daily  dextrose 5%. 1000 milliLiter(s) (50 mL/Hr) IV Continuous <Continuous>  dextrose 5%. 1000 milliLiter(s) (100 mL/Hr) IV Continuous <Continuous>  ergocalciferol 01900 Unit(s) Oral <User Schedule>  ferrous    sulfate 325 milliGRAM(s) Oral daily  glucagon  Injectable 1 milliGRAM(s) IntraMuscular once  insulin lispro (ADMELOG) corrective regimen sliding scale   SubCutaneous Before meals and at bedtime  lactated ringers. 1000 milliLiter(s) (75 mL/Hr) IV Continuous <Continuous>  metoprolol succinate  milliGRAM(s) Oral daily  NIFEdipine XL 60 milliGRAM(s) Oral daily  oxycodone    5 mG/acetaminophen 325 mG 1 Tablet(s) Oral every 8 hours        RADIOLOGY & ADDITIONAL TESTS:    8/16/21: US Renal (08.16.21 @ 17:02) Normal renal ultrasound.    8/16/21: MR Foot No Cont, Right (08.16.21 @ 16:52) The exam is significantly limited by motion artifact. There is a cutaneous wound along the plantar aspect of the fifth metatarsal head with adjacent cellulitic change. There is high T2 and low T1 marrow signal within the plantar aspect of the fifth metatarsal head, consistent with osteomyelitis. There is diffuse fatty atrophy and high T2 signal of musculature, consistent with denervation. There is dorsal subcutaneous soft tissue edema.      < from: Xray Foot AP + Lateral + Oblique, Right (08.14.21 @ 09:51) >  IMPRESSION: No fracture dislocation or focal bone destruction. No unusual periosteal reaction. Joint spaces preserved. Calcaneal enthesopathy. Extensive calcification/ossification in the region of the plantar fascia . Small vessel calcification. Diffuse soft tissue swelling may reflect cellulitis. No soft tissue gas.        MICROBIOLOGY DATA:    Culture - Tissue with Gram Stain (08.20.21 @ 03:59)   Gram Stain: No polymorphonuclear leukocytes seen per low power field   No organisms seen per oil power field   Specimen Source: Bone R 5th metatarsal bone clean ma   Culture Results: No growth to date.        Culture - Blood (08.14.21 @ 21:09)   Specimen Source: .Blood Blood-Venous   Culture Results: No growth to date.     Culture - Blood (08.14.21 @ 21:09)   Specimen Source: .Blood Blood-Venous   Culture Results: No growth to date.     COVID-19 David Domain Antibody (08.15.21 @ 11:30)   COVID-19 David Domain Antibody Result: >250.00    Culture - Surgical Swab (08.14.21 @ 21:08)   Specimen Source: .Surgical Swab Right foot plantar wound   Culture Results:   Moderate Streptococcus agalactiae (Group B) isolated   Group B streptococci are susceptible to ampicillin,   penicillin and cefazolin, but may be resistant to   erythromycin and clindamycin.   Recommendations for intrapartum prophylaxis for Group B   streptococci are penicillin or ampicillin.   Normal skin filiberto isolated     COVID-19 PCR . (08.14.21 @ 09:41)   COVID-19 PCR: NotDetec

## 2021-08-21 NOTE — PROGRESS NOTE ADULT - SUBJECTIVE AND OBJECTIVE BOX
CC: Patient denies CP, SOB, n/v/d, fever or chills.    Vital Signs Last 24 Hrs  T(C): 37.3 (21 Aug 2021 21:18), Max: 37.5 (21 Aug 2021 04:58)  T(F): 99.2 (21 Aug 2021 21:18), Max: 99.5 (21 Aug 2021 04:58)  HR: 76 (21 Aug 2021 21:18) (68 - 76)  BP: 152/71 (21 Aug 2021 21:18) (126/42 - 158/60)  BP(mean): --  RR: 16 (21 Aug 2021 21:18) (16 - 18)  SpO2: 96% (21 Aug 2021 21:18) (96% - 97%)    08-20 @ 07:01  -  08-21 @ 07:00  --------------------------------------------------------  IN: 0 mL / OUT: 400 mL / NET: -400 mL    08-21 @ 07:01  - 08-21 @ 22:18  --------------------------------------------------------  IN: 240 mL / OUT: 600 mL / NET: -360 mL    PHYSICAL EXAM:  General: No acute respiratory distress.  Eyes: conjunctiva and sclera clear  ENMT: Atraumatic, Normocephalic, supple, No JVD present. Moist mucous membranes  Respiratory: Clear to percussion bilaterally; No rales, rhonchi, wheezing  Cardiovasular: S1S2+; no r/g; systolic murmur  Gastrointestinal: Soft, Non-tender, Nondistended; Bowel sounds present  Neuro:  Awake, Alert & Oriented X3  Ext:  No edema, No Cyanosis; Rt foot bandage.  Skin: No visible rashes  MEDICATIONS:  ampicillin/sulbactam  IVPB 3 Gram(s) IV Intermittent every 8 hours  atorvastatin 40 milliGRAM(s) Oral at bedtime  cyanocobalamin 1000 MICROGram(s) Oral daily  dextrose 5%. 1000 milliLiter(s) IV Continuous <Continuous>  dextrose 5%. 1000 milliLiter(s) IV Continuous <Continuous>  ergocalciferol 95590 Unit(s) Oral <User Schedule>  ferrous    sulfate 325 milliGRAM(s) Oral daily  glucagon  Injectable 1 milliGRAM(s) IntraMuscular once  insulin lispro (ADMELOG) corrective regimen sliding scale   SubCutaneous Before meals and at bedtime  lactated ringers. 1000 milliLiter(s) IV Continuous <Continuous>  metoprolol succinate  milliGRAM(s) Oral daily  NIFEdipine XL 60 milliGRAM(s) Oral daily  oxycodone    5 mG/acetaminophen 325 mG 1 Tablet(s) Oral every 8 hours      LABS:                        11.5   6.91  )-----------( 234      ( 21 Aug 2021 06:24 )             34.3     08-21    139  |  107  |  20<H>  ----------------------------<  118<H>  3.7   |  25  |  1.37<H>    Ca    8.4      21 Aug 2021 06:24  Phos  3.1     08-21  Mg     2.3     08-21    TPro  6.9  /  Alb  2.5<L>  /  TBili  0.6  /  DBili  x   /  AST  28  /  ALT  21  /  AlkPhos  113  08-21    Magnesium, Serum: 2.3 mg/dL (08-21 @ 06:24)  Phosphorus Level, Serum: 3.1 mg/dL (08-21 @ 06:24)    ASSESSMENT AND PLAN:     CKD stage 3 is stable at baseline  - Keep patient euvolemic  - Avoid nephrotoxic drugs  - Follow BMP

## 2021-08-21 NOTE — PROGRESS NOTE ADULT - SUBJECTIVE AND OBJECTIVE BOX
Podiatry Interval HPI: Patient was seen resting comfortably in bed. Patient is AAOx3. S/p R fifth partial ray amputation. Patient states he feels a little pain to the R foot but is tolerable. Pt denies any overnight events. Denies N/V/F/C/SOB. No other pedal complaints.     Podiatry HPI: Podiatry consulted regarding 75 yo male patient who presents to ED with a chief complaint of right foot plantar ulcer. Patient states that he noticed the ulcer about 3 weeks ago. Denies any trauma or injury. He recalls stepping on a dog treat and isn't sure if that caused an opening on the bottom of his right foot. Patient states that he has been diagnosed with diabetes for a long time. Patient reports that the ulcer became painful and can hardly ambulate due to pain. Pt rates the pain 10/10 on the VAS. Denies other pedal complaints. Denies constitutional symptoms of N/V/C/F/Sob.     HPI:  Patient is a 76yoM, ambulates and performs ADL independently, w/ PMH of CAD (s/p CABG >10yrs ago), DM, HTN, and HLD, presents with right foot pain x2 weeks. He reports 10/10, progressive, intermittent, sharp, non-radiating, right foot pain. He states stepping on a dog treat approximately 3 weeks ago, which he notice a development of wound at the time. Right foot pain developed a week later. It was mild initially, but progressive has gotten worsen. Patient has been non-compliant with medications for the past 2 months, because he has been feeling great. He denies fever, chills, n/v, dizziness, chest pain, sob, abdominal pain, urinary or bowel movement changes.  (14 Aug 2021 13:11)    Medications ampicillin/sulbactam  IVPB 3 Gram(s) IV Intermittent every 8 hours  atorvastatin 40 milliGRAM(s) Oral at bedtime  cyanocobalamin 1000 MICROGram(s) Oral daily  dextrose 5%. 1000 milliLiter(s) IV Continuous <Continuous>  dextrose 5%. 1000 milliLiter(s) IV Continuous <Continuous>  ergocalciferol 42333 Unit(s) Oral <User Schedule>  ferrous    sulfate 325 milliGRAM(s) Oral daily  glucagon  Injectable 1 milliGRAM(s) IntraMuscular once  insulin lispro (ADMELOG) corrective regimen sliding scale   SubCutaneous Before meals and at bedtime  lactated ringers. 1000 milliLiter(s) IV Continuous <Continuous>  metoprolol succinate  milliGRAM(s) Oral daily  NIFEdipine XL 60 milliGRAM(s) Oral daily  oxycodone    5 mG/acetaminophen 325 mG 1 Tablet(s) Oral every 8 hours    FHFamily history of acute myocardial infarction (Father)    Family history of diabetes mellitus (Mother, Sibling)    Family history of hypertension (Mother, Sibling)    Family history of hyperlipidemia (Mother, Sibling)    ,   PMHHTN (hypertension)    HLD (hyperlipidemia)    DM (diabetes mellitus)    CAD (coronary artery disease)    Glaucoma, angle-closure       PSHNo significant past surgical history    S/P CABG (coronary artery bypass graft)    History of thyroid surgery        Labs                          11.5   6.91  )-----------( 234      ( 21 Aug 2021 06:24 )             34.3      08-21    139  |  107  |  20<H>  ----------------------------<  118<H>  3.7   |  25  |  1.37<H>    Ca    8.4      21 Aug 2021 06:24  Phos  3.1     08-21  Mg     2.3     08-21    TPro  6.9  /  Alb  2.5<L>  /  TBili  0.6  /  DBili  x   /  AST  28  /  ALT  21  /  AlkPhos  113  08-21     Vital Signs Last 24 Hrs  T(C): 37.5 (21 Aug 2021 04:58), Max: 37.5 (21 Aug 2021 04:58)  T(F): 99.5 (21 Aug 2021 04:58), Max: 99.5 (21 Aug 2021 04:58)  HR: 72 (21 Aug 2021 04:58) (70 - 72)  BP: 158/60 (21 Aug 2021 04:58) (126/42 - 162/56)  BP(mean): --  RR: 18 (21 Aug 2021 04:58) (17 - 18)  SpO2: 97% (21 Aug 2021 04:58) (97% - 98%)  Sedimentation Rate, Erythrocyte: 44 mm/Hr (08-15-21 @ 06:18)         C-Reactive Protein, Serum: 67 mg/L (08-15-21 @ 11:55)   WBC Count: 6.91 K/uL (08-21-21 @ 06:24)      ROS: All others negative unless otherwise stated in the HPI    PHYSICAL EXAM  GEN: SHER ROBERT is a pleasant well-nourished, well developed 76y Male in no acute distress, alert awake, and oriented to person, place and time.   LE Focused:    Vasc: DP/PT 2/4 on the left, 1/4 on the right, CFT brisk to all digits, TG warm to warm on the LLE, localized edema and erythema noted to R surgical site consistent with post op course.   Derm: Incision site located to R 5th digit. Sutures well adhered. No wound dehiscence noted to surgical site. No drainage noted. No clinical signs of infection present. Mild periwound erythema noted.    Neuro: Protective sensation and epicritic sensation grossly diminished b/l  MSK: Pain localized to the lateral aspect of the right forefoot, able to move extremities in all compartments     Imaging:   Xray  EXAM:  XR FOOT COMP MIN 3 VIEWS RT                          PROCEDURE DATE:  08/14/2021      INTERPRETATION:  Diabetic foot ulcer cellulitis.    3 views right foot.    IMPRESSION: No fracture dislocation or focal bone destruction. No unusual periosteal reaction. Joint spaces preserved. Calcaneal enthesopathy. Extensive calcification/ossification in the region of the plantar fascia . Small vessel calcification. Diffuse soft tissue swelling may reflect cellulitis. No soft tissue gas.      MRI  EXAM:  MR FOOT RT                          PROCEDURE DATE:  08/16/2021      INTERPRETATION:  EXAMINATION: MRI of the right forefoot without contrast    CLINICAL INFORMATION: Right foot ulcer. Evaluate for osteomyelitis.    TECHNIQUE: Multiplanar, multisequential MR imaging was performed.    FINDINGS: The exam is significantly limited by motion artifact. There is a cutaneous wound along the plantar aspect of the fifth metatarsal head with adjacent cellulitic change. There is high T2 and low T1 marrow signal within the plantar aspect of the fifth metatarsal head, consistent with osteomyelitis. There is diffuse fatty atrophy and high T2 signal of musculature, consistent with denervation. There is dorsal subcutaneous soft tissue edema.    IMPRESSION: Limited exam, as above. Cutaneous wound along the plantar aspect of the fifth digit with adjacent osteomyelitis of the fifth metatarsal head.        SIMON  EXAM:  US PHYSIOL LWR EXT 3+ LEV BI                            PROCEDURE DATE:  08/16/2021          INTERPRETATION:  Clinical indication: Diabetes with right foot ulcer    COMPARISON: None    FINDINGS: There are biphasic waveforms bilaterally, dampened at the level of the metatarsals. No abnormal segmental pressure gradient.    Right SIMON = 0.82  Left SIMON = 0.85    IMPRESSION: ABIs compatible with mild peripheral vascular disease.          Microbiology  Culture Results:   Moderate Streptococcus agalactiae (Group B) isolated   Group B streptococci are susceptible to ampicillin,   penicillin and cefazolin, but may be resistant to   erythromycin and clindamycin.   Recommendations for intrapartum prophylaxis for Group B   streptococci are penicillin or ampicillin.   Normal skin filiberto isolated (08.14.21 @ 21:08)

## 2021-08-21 NOTE — PROGRESS NOTE ADULT - SUBJECTIVE AND OBJECTIVE BOX
C A R D I O L O G Y  **********************************     DATE OF SERVICE: 08-21-21    Patient denies chest pain or shortness of breath.   Review of symptoms otherwise negative.        ampicillin/sulbactam  IVPB 3 Gram(s) IV Intermittent every 8 hours  atorvastatin 40 milliGRAM(s) Oral at bedtime  cyanocobalamin 1000 MICROGram(s) Oral daily  dextrose 5%. 1000 milliLiter(s) IV Continuous <Continuous>  dextrose 5%. 1000 milliLiter(s) IV Continuous <Continuous>  ergocalciferol 29549 Unit(s) Oral <User Schedule>  ferrous    sulfate 325 milliGRAM(s) Oral daily  glucagon  Injectable 1 milliGRAM(s) IntraMuscular once  insulin lispro (ADMELOG) corrective regimen sliding scale   SubCutaneous Before meals and at bedtime  lactated ringers. 1000 milliLiter(s) IV Continuous <Continuous>  metoprolol succinate  milliGRAM(s) Oral daily  NIFEdipine XL 60 milliGRAM(s) Oral daily  oxycodone    5 mG/acetaminophen 325 mG 1 Tablet(s) Oral every 8 hours                            11.5   6.91  )-----------( 234      ( 21 Aug 2021 06:24 )             34.3       Hemoglobin: 11.5 g/dL (08-21 @ 06:24)  Hemoglobin: 11.2 g/dL (08-20 @ 06:08)  Hemoglobin: 11.6 g/dL (08-19 @ 07:02)  Hemoglobin: 12.2 g/dL (08-18 @ 06:09)  Hemoglobin: 11.9 g/dL (08-17 @ 06:27)      08-21    139  |  107  |  20<H>  ----------------------------<  118<H>  3.7   |  25  |  1.37<H>    Ca    8.4      21 Aug 2021 06:24  Phos  3.1     08-21  Mg     2.3     08-21    TPro  6.9  /  Alb  2.5<L>  /  TBili  0.6  /  DBili  x   /  AST  28  /  ALT  21  /  AlkPhos  113  08-21    Creatinine Trend: 1.37<--, 1.30<--, 1.34<--, 1.42<--, 1.52<--, 1.44<--    COAGS:           T(C): 37.5 (08-21-21 @ 04:58), Max: 37.5 (08-21-21 @ 04:58)  HR: 72 (08-21-21 @ 04:58) (70 - 72)  BP: 158/60 (08-21-21 @ 04:58) (126/42 - 162/56)  RR: 18 (08-21-21 @ 04:58) (17 - 18)  SpO2: 97% (08-21-21 @ 04:58) (97% - 98%)  Wt(kg): --    I&O's Summary    20 Aug 2021 07:01  -  21 Aug 2021 07:00  --------------------------------------------------------  IN: 0 mL / OUT: 400 mL / NET: -400 mL      HEENT:  (-)icterus (-)pallor  CV: N S1 S2 1/6 TRINIDAD (+)2 Pulses B/l  Resp:  Clear to ausculatation B/L, normal effort  GI: (+) BS Soft, NT, ND  Lymph:  (-)Edema, (-)obvious lymphadenopathy  Skin: Warm to touch, Normal turgor  Psych: Appropriate mood and affect          ASSESSMENT/PLAN: 	76y  Male ambulates and performs ADL independently, w/ PMH of CAD (s/p CABG >10yrs ago), DM, HTN, and HLD, presents with right foot pain x2 weeks. Cardiology consulted for management of cad s/p 5th partial ray amputation.    - tolerated procedure  - Echo noted, CINTIA of 1.0 cm moderate to severe AS, will need SABIHA and AV w/u once acute issues resolve.  He denies CP, SOB or LOC  - no clinical CHF  - Abx per ID  - cont ASA, statin BB

## 2021-08-21 NOTE — PROGRESS NOTE ADULT - ASSESSMENT
Patient is a 76y old  Male who ambulates and performs ADL independently, w/ PMH of CAD (s/p CABG >10yrs ago), DM, HTN, and HLD, now presents to the ER for evaluation of right foot pain x2 weeks.  He states stepping on a dog treat approximately 3 weeks ago, which he notice a development of wound at the time. Right foot pain developed a week later. It was mild initially, but progressive has gotten worsen. Patient has been non-compliant with medications for the past 2 months, because he has been feeling great. ON admission, he found to have No fever but tachycardia. He has started on Unasyn and Vancomycin, and the ID consult requested to assist with further evaluation and antibiotic management.    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 Follow up intra-op culture and pathology result  3. Continue Unasyn since wound culture grew Streptococcus agalactiae  4. Monitor kidney function  5. Wound care as per Podiatry     d/w House staff     Attending Attestation:    Spent more than 35 minutes on total encounter, more than 50 % of the visit was spent counseling and/or coordinating care by the Attending physician. Patient is a 76y old  Male who ambulates and performs ADL independently, w/ PMH of CAD (s/p CABG >10yrs ago), DM, HTN, and HLD, now presents to the ER for evaluation of right foot pain x2 weeks.  He states stepping on a dog treat approximately 3 weeks ago, which he notice a development of wound at the time. Right foot pain developed a week later. It was mild initially, but progressive has gotten worsen. Patient has been non-compliant with medications for the past 2 months, because he has been feeling great. ON admission, he found to have No fever but tachycardia. He has started on Unasyn and Vancomycin, and the ID consult requested to assist with further evaluation and antibiotic management.    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 Follow up intra-op culture and pathology result  3. Continue Unasyn since wound culture grew Streptococcus agalactiae  4. Monitor kidney function  5. Wound care as per Podiatry     Attending Attestation:    Spent more than 35 minutes on total encounter, more than 50 % of the visit was spent counseling and/or coordinating care by the Attending physician.

## 2021-08-21 NOTE — PROGRESS NOTE ADULT - SUBJECTIVE AND OBJECTIVE BOX
PGY-1 Progress Note discussed with attending    INTERVAL HPI  - Patient is a 75 y/o M (ambulates and ADL independently) w PMH of CAD (s/p CABG >10yrs ago), DM, HTN, and HLD, presents with right foot pain x2 weeks admitted for diabetic right foot ulcer. Patient was evaluated by infectious disease and started on IV unasyn for foot ulcer (wound culture growing GBS), XR showed cellulitis and MRI with osteomyelitis. Patient was evaluated by podiatry who performed right 5th metatarsal head resection. Patient underwent SIMON/PVR as per vascular evaluation recommendations which showed mild PVD. Patient also noted to have OREN, nephrology was consulted and recommended starting ACEI once Cr improves given significant proteinuria. Paitent was evaluated by cardiology given history of CAD, TTE showed G1DD and moderate to severe AS with recommendation for outpatient SABIHA. For HTN, patient's antihypertensives were titrated as tolerated, isosorbide was held and patient's amlodipine was discontinued, started on nifedipine ER 60 mg and BP was well controlled prior to discharge.    OVERNIGHT EVENTS:   Patient  complaining of pain , controlled. Otherwise no events or new complaints noted.     REVIEW OF SYSTEMS:  CONSTITUTIONAL: No fever, weight loss, or fatigue  RESPIRATORY: No cough, wheezing, chills or hemoptysis; No shortness of breath  CARDIOVASCULAR: No chest pain, palpitations, dizziness, or leg swelling  GASTROINTESTINAL: No abdominal pain. No nausea, vomiting, or hematemesis; No diarrhea or constipation. No melena or hematochezia.  GENITOURINARY: No dysuria or hematuria, urinary frequency  NEUROLOGICAL: No headaches, memory loss, loss of strength, numbness, or tremors  SKIN: No itching, burning, rashes, or lesions     MEDICATIONS  (STANDING):  ampicillin/sulbactam  IVPB 3 Gram(s) IV Intermittent every 8 hours  atorvastatin 40 milliGRAM(s) Oral at bedtime  cyanocobalamin 1000 MICROGram(s) Oral daily  dextrose 5%. 1000 milliLiter(s) (50 mL/Hr) IV Continuous <Continuous>  dextrose 5%. 1000 milliLiter(s) (100 mL/Hr) IV Continuous <Continuous>  ergocalciferol 87845 Unit(s) Oral <User Schedule>  ferrous    sulfate 325 milliGRAM(s) Oral daily  glucagon  Injectable 1 milliGRAM(s) IntraMuscular once  insulin lispro (ADMELOG) corrective regimen sliding scale   SubCutaneous Before meals and at bedtime  lactated ringers. 1000 milliLiter(s) (75 mL/Hr) IV Continuous <Continuous>  metoprolol succinate  milliGRAM(s) Oral daily  NIFEdipine XL 60 milliGRAM(s) Oral daily  oxycodone    5 mG/acetaminophen 325 mG 1 Tablet(s) Oral every 8 hours    MEDICATIONS  (PRN):      Vital Signs Last 24 Hrs  T(C): 37.4 (21 Aug 2021 13:47), Max: 37.5 (21 Aug 2021 04:58)  T(F): 99.3 (21 Aug 2021 13:47), Max: 99.5 (21 Aug 2021 04:58)  HR: 68 (21 Aug 2021 13:47) (68 - 72)  BP: 147/65 (21 Aug 2021 13:47) (126/42 - 158/60)  BP(mean): --  RR: 16 (21 Aug 2021 13:47) (16 - 18)  SpO2: 97% (21 Aug 2021 13:47) (97% - 97%)    PHYSICAL EXAMINATION:  GENERAL: NAD, well built  HEAD:  Atraumatic, Normocephalic  EYES:  conjunctiva and sclera clear  NECK: Supple, No JVD, Normal thyroid  CHEST/LUNG: Clear to auscultation. Clear to percussion bilaterally; No rales, rhonchi, wheezing, or rubs  HEART: Regular rate and rhythm; No murmurs, rubs, or gallops  ABDOMEN: Soft, Nontender, Nondistended; Bowel sounds present, no pain or masses on palpation  NERVOUS SYSTEM:  Alert & Oriented X3  : voiding well  EXTREMITIES:  2+ Peripheral Pulses, No clubbing, cyanosis, or edema  SKIN: warm dry                          11.5   6.91  )-----------( 234      ( 21 Aug 2021 06:24 )             34.3     08-21    139  |  107  |  20<H>  ----------------------------<  118<H>  3.7   |  25  |  1.37<H>    Ca    8.4      21 Aug 2021 06:24  Phos  3.1     08-21  Mg     2.3     08-21    TPro  6.9  /  Alb  2.5<L>  /  TBili  0.6  /  DBili  x   /  AST  28  /  ALT  21  /  AlkPhos  113  08-21    LIVER FUNCTIONS - ( 21 Aug 2021 06:24 )  Alb: 2.5 g/dL / Pro: 6.9 g/dL / ALK PHOS: 113 U/L / ALT: 21 U/L DA / AST: 28 U/L / GGT: x                   I&O's Summary    20 Aug 2021 07:01  -  21 Aug 2021 07:00  --------------------------------------------------------  IN: 0 mL / OUT: 400 mL / NET: -400 mL          Culture - Tissue with Gram Stain (collected 20 Aug 2021 03:59)  Source: Bone R 5th metatarsal bone clean ma  Gram Stain (20 Aug 2021 07:19):    No polymorphonuclear leukocytes seen per low power field    No organisms seen per oil power field  Preliminary Report (21 Aug 2021 08:42):    No growth to date.        CAPILLARY BLOOD GLUCOSE      RADIOLOGY & ADDITIONAL TESTS:

## 2021-08-21 NOTE — PROGRESS NOTE ADULT - PROBLEM SELECTOR PLAN 1
- p/w right foot ulcer and pain; non-complaint w/ medications  - PE: right foot fifth metatarsal plantar ulcer  - WBC wnl  - foot xray cellulitis  - probe to bone  - s/p vanco, zosyn  - c/w pain management, dilaudid PRN  - ID recommending Unasyn, wound culture growing GBS  - MR foot with + OM  - SIMON PVR with mild peripheral vascular disease    R 5th metatarsal head resection procedure completed by podiatry, Incision site located to R 5th digit. Sutures well adhered. No wound dehiscence noted to surgical site. Sanguinous drainage noted to bandage. No clinical signs of infection present.   surgical pathology collected   C/w unasyn  Will switch to PO Abx once results of surgical pathology are back\    PT recommending STEVAN

## 2021-08-22 LAB
ALBUMIN SERPL ELPH-MCNC: 3.1 G/DL — LOW (ref 3.5–5)
ALP SERPL-CCNC: 118 U/L — SIGNIFICANT CHANGE UP (ref 40–120)
ALT FLD-CCNC: 21 U/L DA — SIGNIFICANT CHANGE UP (ref 10–60)
ANION GAP SERPL CALC-SCNC: 6 MMOL/L — SIGNIFICANT CHANGE UP (ref 5–17)
AST SERPL-CCNC: 26 U/L — SIGNIFICANT CHANGE UP (ref 10–40)
BILIRUB SERPL-MCNC: 0.5 MG/DL — SIGNIFICANT CHANGE UP (ref 0.2–1.2)
BUN SERPL-MCNC: 21 MG/DL — HIGH (ref 7–18)
CALCIUM SERPL-MCNC: 8.6 MG/DL — SIGNIFICANT CHANGE UP (ref 8.4–10.5)
CHLORIDE SERPL-SCNC: 108 MMOL/L — SIGNIFICANT CHANGE UP (ref 96–108)
CO2 SERPL-SCNC: 26 MMOL/L — SIGNIFICANT CHANGE UP (ref 22–31)
CREAT SERPL-MCNC: 1.41 MG/DL — HIGH (ref 0.5–1.3)
CRP SERPL-MCNC: 85 MG/L — HIGH
ERYTHROCYTE [SEDIMENTATION RATE] IN BLOOD: 91 MM/HR — HIGH (ref 0–20)
GLUCOSE BLDC GLUCOMTR-MCNC: 131 MG/DL — HIGH (ref 70–99)
GLUCOSE BLDC GLUCOMTR-MCNC: 139 MG/DL — HIGH (ref 70–99)
GLUCOSE BLDC GLUCOMTR-MCNC: 162 MG/DL — HIGH (ref 70–99)
GLUCOSE BLDC GLUCOMTR-MCNC: 176 MG/DL — HIGH (ref 70–99)
GLUCOSE SERPL-MCNC: 173 MG/DL — HIGH (ref 70–99)
HCT VFR BLD CALC: 35.2 % — LOW (ref 39–50)
HGB BLD-MCNC: 11.9 G/DL — LOW (ref 13–17)
MAGNESIUM SERPL-MCNC: 2.4 MG/DL — SIGNIFICANT CHANGE UP (ref 1.6–2.6)
MCHC RBC-ENTMCNC: 29.3 PG — SIGNIFICANT CHANGE UP (ref 27–34)
MCHC RBC-ENTMCNC: 33.8 GM/DL — SIGNIFICANT CHANGE UP (ref 32–36)
MCV RBC AUTO: 86.7 FL — SIGNIFICANT CHANGE UP (ref 80–100)
NRBC # BLD: 0 /100 WBCS — SIGNIFICANT CHANGE UP (ref 0–0)
PHOSPHATE SERPL-MCNC: 3.2 MG/DL — SIGNIFICANT CHANGE UP (ref 2.5–4.5)
PLATELET # BLD AUTO: 235 K/UL — SIGNIFICANT CHANGE UP (ref 150–400)
POTASSIUM SERPL-MCNC: 4 MMOL/L — SIGNIFICANT CHANGE UP (ref 3.5–5.3)
POTASSIUM SERPL-SCNC: 4 MMOL/L — SIGNIFICANT CHANGE UP (ref 3.5–5.3)
PROT SERPL-MCNC: 7.2 G/DL — SIGNIFICANT CHANGE UP (ref 6–8.3)
RBC # BLD: 4.06 M/UL — LOW (ref 4.2–5.8)
RBC # FLD: 12.4 % — SIGNIFICANT CHANGE UP (ref 10.3–14.5)
SODIUM SERPL-SCNC: 140 MMOL/L — SIGNIFICANT CHANGE UP (ref 135–145)
WBC # BLD: 7.07 K/UL — SIGNIFICANT CHANGE UP (ref 3.8–10.5)
WBC # FLD AUTO: 7.07 K/UL — SIGNIFICANT CHANGE UP (ref 3.8–10.5)

## 2021-08-22 RX ORDER — MORPHINE SULFATE 50 MG/1
1 CAPSULE, EXTENDED RELEASE ORAL ONCE
Refills: 0 | Status: DISCONTINUED | OUTPATIENT
Start: 2021-08-22 | End: 2021-08-22

## 2021-08-22 RX ORDER — ACETAMINOPHEN 500 MG
650 TABLET ORAL ONCE
Refills: 0 | Status: DISCONTINUED | OUTPATIENT
Start: 2021-08-22 | End: 2021-08-22

## 2021-08-22 RX ORDER — NITROGLYCERIN 6.5 MG
1 CAPSULE, EXTENDED RELEASE ORAL ONCE
Refills: 0 | Status: COMPLETED | OUTPATIENT
Start: 2021-08-22 | End: 2021-08-22

## 2021-08-22 RX ADMIN — AMPICILLIN SODIUM AND SULBACTAM SODIUM 200 GRAM(S): 250; 125 INJECTION, POWDER, FOR SUSPENSION INTRAMUSCULAR; INTRAVENOUS at 06:12

## 2021-08-22 RX ADMIN — OXYCODONE AND ACETAMINOPHEN 1 TABLET(S): 5; 325 TABLET ORAL at 23:39

## 2021-08-22 RX ADMIN — PREGABALIN 1000 MICROGRAM(S): 225 CAPSULE ORAL at 11:20

## 2021-08-22 RX ADMIN — AMPICILLIN SODIUM AND SULBACTAM SODIUM 200 GRAM(S): 250; 125 INJECTION, POWDER, FOR SUSPENSION INTRAMUSCULAR; INTRAVENOUS at 13:32

## 2021-08-22 RX ADMIN — MORPHINE SULFATE 1 MILLIGRAM(S): 50 CAPSULE, EXTENDED RELEASE ORAL at 01:16

## 2021-08-22 RX ADMIN — Medication 100 MILLIGRAM(S): at 06:12

## 2021-08-22 RX ADMIN — Medication 325 MILLIGRAM(S): at 11:20

## 2021-08-22 RX ADMIN — OXYCODONE AND ACETAMINOPHEN 1 TABLET(S): 5; 325 TABLET ORAL at 06:37

## 2021-08-22 RX ADMIN — ATORVASTATIN CALCIUM 40 MILLIGRAM(S): 80 TABLET, FILM COATED ORAL at 21:59

## 2021-08-22 RX ADMIN — AMPICILLIN SODIUM AND SULBACTAM SODIUM 200 GRAM(S): 250; 125 INJECTION, POWDER, FOR SUSPENSION INTRAMUSCULAR; INTRAVENOUS at 21:59

## 2021-08-22 RX ADMIN — MORPHINE SULFATE 1 MILLIGRAM(S): 50 CAPSULE, EXTENDED RELEASE ORAL at 00:46

## 2021-08-22 RX ADMIN — Medication 1 INCH(S): at 13:07

## 2021-08-22 RX ADMIN — OXYCODONE AND ACETAMINOPHEN 1 TABLET(S): 5; 325 TABLET ORAL at 21:59

## 2021-08-22 RX ADMIN — Medication 1: at 22:00

## 2021-08-22 RX ADMIN — OXYCODONE AND ACETAMINOPHEN 1 TABLET(S): 5; 325 TABLET ORAL at 06:11

## 2021-08-22 RX ADMIN — Medication 1: at 08:04

## 2021-08-22 RX ADMIN — Medication 60 MILLIGRAM(S): at 06:12

## 2021-08-22 NOTE — PROGRESS NOTE ADULT - ASSESSMENT
Assessment  s/p R 5th partial ray amputation (8/19)   Osteomyelitis R 5th digit   Diabetic ulcer R sub 5th met  DM Type II      Plan  Patient evaluated and chart created  Surgical pathology pending   Dressing removed and suture site evaluated - distal portion looks ischemic   Ordered Nitrobid 2% ointment and applied near the pedal pulses   Applied betadine and DSD to the right foot   Pt to be nonWB to R foot  Keep dressing clean, dry and intact    Continue antibiotics per ID recommendation   Podiatry to follow while in house   Upon discharge, please follow up at [phone] [facility] [address]nd floor [address] [phone]   Discussed with attending Dr. Vazquez

## 2021-08-22 NOTE — PROGRESS NOTE ADULT - SUBJECTIVE AND OBJECTIVE BOX
`Patient is a 76y old  Male who presents with a chief complaint of diabetic ulcer (22 Aug 2021 10:08)    PATIENT IS SEEN AND EXAMINED IN MEDICAL FLOOR.  KEENANT [    ]    MADDY [   ]      GT [   ]    ALLERGIES:  No Known Allergies      Daily     Daily     VITALS:    Vital Signs Last 24 Hrs  T(C): 36.8 (22 Aug 2021 04:52), Max: 37.4 (21 Aug 2021 13:47)  T(F): 98.2 (22 Aug 2021 04:52), Max: 99.3 (21 Aug 2021 13:47)  HR: 74 (22 Aug 2021 04:52) (68 - 76)  BP: 169/78 (22 Aug 2021 04:52) (147/65 - 169/78)  BP(mean): --  RR: 18 (22 Aug 2021 04:52) (16 - 18)  SpO2: 96% (22 Aug 2021 04:52) (96% - 97%)    LABS:    CBC Full  -  ( 22 Aug 2021 07:15 )  WBC Count : 7.07 K/uL  RBC Count : 4.06 M/uL  Hemoglobin : 11.9 g/dL  Hematocrit : 35.2 %  Platelet Count - Automated : 235 K/uL  Mean Cell Volume : 86.7 fl  Mean Cell Hemoglobin : 29.3 pg  Mean Cell Hemoglobin Concentration : 33.8 gm/dL  Auto Neutrophil # : x  Auto Lymphocyte # : x  Auto Monocyte # : x  Auto Eosinophil # : x  Auto Basophil # : x  Auto Neutrophil % : x  Auto Lymphocyte % : x  Auto Monocyte % : x  Auto Eosinophil % : x  Auto Basophil % : x      08-22    140  |  108  |  21<H>  ----------------------------<  173<H>  4.0   |  26  |  1.41<H>    Ca    8.6      22 Aug 2021 07:15  Phos  3.2     08-22  Mg     2.4     08-22    TPro  7.2  /  Alb  3.1<L>  /  TBili  0.5  /  DBili  x   /  AST  26  /  ALT  21  /  AlkPhos  118  08-22    CAPILLARY BLOOD GLUCOSE      POCT Blood Glucose.: 131 mg/dL (22 Aug 2021 11:36)  POCT Blood Glucose.: 162 mg/dL (22 Aug 2021 07:56)  POCT Blood Glucose.: 216 mg/dL (21 Aug 2021 21:25)  POCT Blood Glucose.: 122 mg/dL (21 Aug 2021 17:12)        LIVER FUNCTIONS - ( 22 Aug 2021 07:15 )  Alb: 3.1 g/dL / Pro: 7.2 g/dL / ALK PHOS: 118 U/L / ALT: 21 U/L DA / AST: 26 U/L / GGT: x           Creatinine Trend: 1.41<--, 1.37<--, 1.30<--, 1.34<--, 1.42<--, 1.52<--  I&O's Summary    21 Aug 2021 07:01  -  22 Aug 2021 07:00  --------------------------------------------------------  IN: 240 mL / OUT: 600 mL / NET: -360 mL            Bone R 5th metatarsal bone clean ma  08-20 @ 03:59   No growth to date.  --    No polymorphonuclear leukocytes seen per low power field  No organisms seen per oil power field      .Blood Blood-Venous  08-14 @ 21:09   No Growth Final  --  --      .Surgical Swab Right foot plantar wound  08-14 @ 21:08   Moderate Streptococcus agalactiae (Group B) isolated  Group B streptococci are susceptible to ampicillin,  penicillin and cefazolin, but may be resistant to  erythromycin and clindamycin.  Recommendations for intrapartum prophylaxis for Group B  streptococci are penicillin or ampicillin.  Moderate Bacteroides fragilis "Susceptibilities not performed"  Normal skin filiberto isolated  --  --          MEDICATIONS:    MEDICATIONS  (STANDING):  ampicillin/sulbactam  IVPB 3 Gram(s) IV Intermittent every 8 hours  atorvastatin 40 milliGRAM(s) Oral at bedtime  cyanocobalamin 1000 MICROGram(s) Oral daily  dextrose 5%. 1000 milliLiter(s) (50 mL/Hr) IV Continuous <Continuous>  dextrose 5%. 1000 milliLiter(s) (100 mL/Hr) IV Continuous <Continuous>  ergocalciferol 52529 Unit(s) Oral <User Schedule>  ferrous    sulfate 325 milliGRAM(s) Oral daily  glucagon  Injectable 1 milliGRAM(s) IntraMuscular once  insulin lispro (ADMELOG) corrective regimen sliding scale   SubCutaneous Before meals and at bedtime  lactated ringers. 1000 milliLiter(s) (75 mL/Hr) IV Continuous <Continuous>  metoprolol succinate  milliGRAM(s) Oral daily  NIFEdipine XL 60 milliGRAM(s) Oral daily  oxycodone    5 mG/acetaminophen 325 mG 1 Tablet(s) Oral every 8 hours      MEDICATIONS  (PRN):      REVIEW OF SYSTEMS:                           ALL ROS DONE [ X   ]    CONSTITUTIONAL:  LETHARGIC [   ], FEVER [   ], UNRESPONSIVE [   ]  CVS:  CP  [   ], SOB, [   ], PALPITATIONS [   ], DIZZYNESS [   ]  RS: COUGH [   ], SPUTUM [   ]  GI: ABDOMINAL PAIN [   ], NAUSEA [   ], VOMITINGS [   ], DIARRHEA [   ], CONSTIPATION [   ]  :  DYSURIA [   ], NOCTURIA [   ], INCREASED FREQUENCY [   ], DRIBLING [   ],  SKELETAL: PAINFUL JOINTS [   ], SWOLLEN JOINTS [   ], NECK ACHE [   ], LOW BACK ACHE [   ],  SKIN : ULCERS [   ], RASH [   ], ITCHING [   ]  CNS: HEAD ACHE [   ], DOUBLE VISION [   ], BLURRED VISION [   ], AMS / CONFUSION [   ], SEIZURES [   ], WEAKNESS [   ],TINGLING / NUMBNESS [   ]        PHYSICAL EXAMINATION:  GENERAL APPEARANCE: NO DISTRESS  HEENT:  NO PALLOR, NO  JVD,  NO   NODES, NECK SUPPLE  CVS: S1 +, S2 +,   RS: AEEB,  OCCASIONAL  RALES +,   NO RONCHI  ABD: SOFT, NT, NO, BS +  EXT: NO PE  SKIN: WARM,   RIGHT FOOT IN BANDAGE  SKELETAL:  ROM ACCEPTABLE  CNS:  AAO X 3,   DEFICITS    RADIOLOGY :      EXAM:  XR FOOT COMP MIN 3 VIEWS RT                            PROCEDURE DATE:  08/14/2021          INTERPRETATION:  Diabetic foot ulcer cellulitis.    3 views right foot.    IMPRESSION: No fracture dislocation or focal bone destruction. No unusual periosteal reaction. Joint spaces preserved. Calcaneal enthesopathy. Extensive calcification/ossification in the region of the plantar fascia . Small vessel calcification. Diffuse soft tissue swelling may reflect cellulitis. No soft tissue gas.      ASSESSMENT :     Type 2 diabetes mellitus with foot ulcer    HTN (hypertension)    HLD (hyperlipidemia)    DM (diabetes mellitus)    CAD (coronary artery disease)    Glaucoma, angle-closure    No significant past surgical history    S/P CABG (coronary artery bypass graft)    History of thyroid surgery        PLAN:  HPI:  Patient is a 76yoM, ambulates and performs ADL independently, w/ PMH of CAD (s/p CABG >10yrs ago), DM, HTN, and HLD, presents with right foot pain x2 weeks. He reports 10/10, progressive, intermittent, sharp, non-radiating, right foot pain. He states stepping on a dog treat approximately 3 weeks ago, which he notice a development of wound at the time. Right foot pain developed a week later. It was mild initially, but progressive has gotten worsen. Patient has been non-compliant with medications for the past 2 months, because he has been feeling great. He denies fever, chills, n/v, dizziness, chest pain, sob, abdominal pain, urinary or bowel movement changes.  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 ORTEGA  - WOUND CX - GBS, BCX - NGTD  - F/U BONE PATHOLOGY  - ID CONSULT, PODIATRY CONSULT    # MEDICAL CLEARANCE FOR SURGERY - RCRI - 2 - 10.1 % 30 DAY RISK OF DEATH, MI OR CARDIAC ARREST  - CARDIOLOGY CONSULT IN PROGRESS    # OREN  ON SUSPECTED CKD - S/P IVF, MONITOR CR, AVOID NEPHROTOXIC AGENTS  - PLACED ON IVF, NOTED UA   - NEPHROLOGY CONSULT IN A.M. - DR. SOUZA    # DM -  HBA1C - 11, STARTED LANTUS, SSI + FS    # CAD S/P CABG - PLACED ON ASA, STATIN, BB, REVIEWED ECHOCARDIOGRAM  - ECHO - SEVERE AORTIC STENOSIS, SEVERE CONCENTRIC LVH, G1DD, MILD VA  - CARDIOLOGY CONSULT    # HTN - ON METOPROLOL, NICARDIPINE  - D/Jamarcus AMLODIPINE    # SEVERE, ASYMPTOMATIC, STENOSIS - ONCE ACUTE ILLNESS RESOLVES, WILL LIKELY NEED ISCHEMIC WORKUP, SABIHA TO EVALUATE FURTHER. D/W PATIENT, DAUGHTER AND CARDIOLOGY TEAM.    # VITAMIN D DEFICIENCY - STARTED ON CHOLECALCIFEROL    # HLD - STATIN, REVIEWED LIPID PANEL    # CASE DISCUSSED AT LENGTH WITH PATIENT AND DAUGHTER, BIRDIE ROBERT - [phone]    # GI AND DVT PPX .     Patient is a 76y old  Male who presents with a chief complaint of diabetic ulcer (22 Aug 2021 10:08)    PATIENT IS SEEN AND EXAMINED IN MEDICAL FLOOR.      ALLERGIES:  No Known Allergies      VITALS:    Vital Signs Last 24 Hrs  T(C): 36.8 (22 Aug 2021 04:52), Max: 37.4 (21 Aug 2021 13:47)  T(F): 98.2 (22 Aug 2021 04:52), Max: 99.3 (21 Aug 2021 13:47)  HR: 74 (22 Aug 2021 04:52) (68 - 76)  BP: 169/78 (22 Aug 2021 04:52) (147/65 - 169/78)  BP(mean): --  RR: 18 (22 Aug 2021 04:52) (16 - 18)  SpO2: 96% (22 Aug 2021 04:52) (96% - 97%)    LABS:    CBC Full  -  ( 22 Aug 2021 07:15 )  WBC Count : 7.07 K/uL  RBC Count : 4.06 M/uL  Hemoglobin : 11.9 g/dL  Hematocrit : 35.2 %  Platelet Count - Automated : 235 K/uL  Mean Cell Volume : 86.7 fl  Mean Cell Hemoglobin : 29.3 pg  Mean Cell Hemoglobin Concentration : 33.8 gm/dL  Auto Neutrophil # : x  Auto Lymphocyte # : x  Auto Monocyte # : x  Auto Eosinophil # : x  Auto Basophil # : x  Auto Neutrophil % : x  Auto Lymphocyte % : x  Auto Monocyte % : x  Auto Eosinophil % : x  Auto Basophil % : x      08-22    140  |  108  |  21<H>  ----------------------------<  173<H>  4.0   |  26  |  1.41<H>    Ca    8.6      22 Aug 2021 07:15  Phos  3.2     08-22  Mg     2.4     08-22    TPro  7.2  /  Alb  3.1<L>  /  TBili  0.5  /  DBili  x   /  AST  26  /  ALT  21  /  AlkPhos  118  08-22    CAPILLARY BLOOD GLUCOSE      POCT Blood Glucose.: 131 mg/dL (22 Aug 2021 11:36)  POCT Blood Glucose.: 162 mg/dL (22 Aug 2021 07:56)  POCT Blood Glucose.: 216 mg/dL (21 Aug 2021 21:25)  POCT Blood Glucose.: 122 mg/dL (21 Aug 2021 17:12)        LIVER FUNCTIONS - ( 22 Aug 2021 07:15 )  Alb: 3.1 g/dL / Pro: 7.2 g/dL / ALK PHOS: 118 U/L / ALT: 21 U/L DA / AST: 26 U/L / GGT: x           Creatinine Trend: 1.41<--, 1.37<--, 1.30<--, 1.34<--, 1.42<--, 1.52<--  I&O's Summary    21 Aug 2021 07:01  -  22 Aug 2021 07:00  --------------------------------------------------------  IN: 240 mL / OUT: 600 mL / NET: -360 mL            Bone R 5th metatarsal bone clean ma  08-20 @ 03:59   No growth to date.  --    No polymorphonuclear leukocytes seen per low power field  No organisms seen per oil power field      .Blood Blood-Venous  08-14 @ 21:09   No Growth Final  --  --      .Surgical Swab Right foot plantar wound  08-14 @ 21:08   Moderate Streptococcus agalactiae (Group B) isolated  Group B streptococci are susceptible to ampicillin,  penicillin and cefazolin, but may be resistant to  erythromycin and clindamycin.  Recommendations for intrapartum prophylaxis for Group B  streptococci are penicillin or ampicillin.  Moderate Bacteroides fragilis "Susceptibilities not performed"  Normal skin filiberto isolated  --  --          MEDICATIONS:    MEDICATIONS  (STANDING):  ampicillin/sulbactam  IVPB 3 Gram(s) IV Intermittent every 8 hours  atorvastatin 40 milliGRAM(s) Oral at bedtime  cyanocobalamin 1000 MICROGram(s) Oral daily  dextrose 5%. 1000 milliLiter(s) (50 mL/Hr) IV Continuous <Continuous>  dextrose 5%. 1000 milliLiter(s) (100 mL/Hr) IV Continuous <Continuous>  ergocalciferol 43269 Unit(s) Oral <User Schedule>  ferrous    sulfate 325 milliGRAM(s) Oral daily  glucagon  Injectable 1 milliGRAM(s) IntraMuscular once  insulin lispro (ADMELOG) corrective regimen sliding scale   SubCutaneous Before meals and at bedtime  lactated ringers. 1000 milliLiter(s) (75 mL/Hr) IV Continuous <Continuous>  metoprolol succinate  milliGRAM(s) Oral daily  NIFEdipine XL 60 milliGRAM(s) Oral daily  oxycodone    5 mG/acetaminophen 325 mG 1 Tablet(s) Oral every 8 hours      MEDICATIONS  (PRN):      REVIEW OF SYSTEMS:                           ALL ROS DONE [ X   ]    CONSTITUTIONAL:  LETHARGIC [   ], FEVER [   ], UNRESPONSIVE [   ]  CVS:  CP  [   ], SOB, [   ], PALPITATIONS [   ], DIZZYNESS [   ]  RS: COUGH [   ], SPUTUM [   ]  GI: ABDOMINAL PAIN [   ], NAUSEA [   ], VOMITINGS [   ], DIARRHEA [   ], CONSTIPATION [   ]  :  DYSURIA [   ], NOCTURIA [   ], INCREASED FREQUENCY [   ], DRIBLING [   ],  SKELETAL: PAINFUL JOINTS [   ], SWOLLEN JOINTS [   ], NECK ACHE [   ], LOW BACK ACHE [   ],  SKIN : ULCERS [   ], RASH [   ], ITCHING [   ]  CNS: HEAD ACHE [   ], DOUBLE VISION [   ], BLURRED VISION [   ], AMS / CONFUSION [   ], SEIZURES [   ], WEAKNESS [   ],TINGLING / NUMBNESS [   ]        PHYSICAL EXAMINATION:  GENERAL APPEARANCE: NO DISTRESS  HEENT:  NO PALLOR, NO  JVD,  NO   NODES, NECK SUPPLE  CVS: S1 +, S2 +,   RS: AEEB,  OCCASIONAL  RALES +,   NO RONCHI  ABD: SOFT, NT, NO, BS +  EXT: NO PE  SKIN: WARM,   RIGHT FOOT IN BANDAGE  SKELETAL:  ROM ACCEPTABLE  CNS:  AAO X 3,   DEFICITS    RADIOLOGY :      EXAM:  XR FOOT COMP MIN 3 VIEWS RT                            PROCEDURE DATE:  08/14/2021          INTERPRETATION:  Diabetic foot ulcer cellulitis.    3 views right foot.    IMPRESSION: No fracture dislocation or focal bone destruction. No unusual periosteal reaction. Joint spaces preserved. Calcaneal enthesopathy. Extensive calcification/ossification in the region of the plantar fascia . Small vessel calcification. Diffuse soft tissue swelling may reflect cellulitis. No soft tissue gas.      ASSESSMENT :     Type 2 diabetes mellitus with foot ulcer    HTN (hypertension)    HLD (hyperlipidemia)    DM (diabetes mellitus)    CAD (coronary artery disease)    Glaucoma, angle-closure    No significant past surgical history    S/P CABG (coronary artery bypass graft)    History of thyroid surgery        PLAN:  HPI:  Patient is a 76yoM, ambulates and performs ADL independently, w/ PMH of CAD (s/p CABG >10yrs ago), DM, HTN, and HLD, presents with right foot pain x2 weeks. He reports 10/10, progressive, intermittent, sharp, non-radiating, right foot pain. He states stepping on a dog treat approximately 3 weeks ago, which he notice a development of wound at the time. Right foot pain developed a week later. It was mild initially, but progressive has gotten worsen. Patient has been non-compliant with medications for the past 2 months, because he has been feeling great. He denies fever, chills, n/v, dizziness, chest pain, sob, abdominal pain, urinary or bowel movement changes.  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 ORTEGA  - WOUND CX - GBS, BCX - NGTD  - F/U BONE PATHOLOGY  - ID CONSULT, PODIATRY CONSULT    # MEDICAL CLEARANCE FOR SURGERY - RCRI - 2 - 10.1 % 30 DAY RISK OF DEATH, MI OR CARDIAC ARREST  - CARDIOLOGY CONSULT IN PROGRESS    # OREN  ON SUSPECTED CKD - S/P IVF, MONITOR CR, AVOID NEPHROTOXIC AGENTS  - PLACED ON IVF, NOTED UA   - NEPHROLOGY CONSULT IN A.M. - DR. SOUZA    # DM -  HBA1C - 11, STARTED LANTUS, SSI + FS    # CAD S/P CABG - PLACED ON ASA, STATIN, BB, REVIEWED ECHOCARDIOGRAM  - ECHO - SEVERE AORTIC STENOSIS, SEVERE CONCENTRIC LVH, G1DD, MILD CT  - CARDIOLOGY CONSULT    # HTN - ON METOPROLOL, NICARDIPINE  - D/Jamarcus AMLODIPINE    # SEVERE, ASYMPTOMATIC, STENOSIS - ONCE ACUTE ILLNESS RESOLVES, WILL LIKELY NEED ISCHEMIC WORKUP, SABIHA TO EVALUATE FURTHER. D/W PATIENT, DAUGHTER AND CARDIOLOGY TEAM.    # VITAMIN D DEFICIENCY - STARTED ON CHOLECALCIFEROL    # HLD - STATIN, REVIEWED LIPID PANEL    # CASE DISCUSSED AT LENGTH WITH PATIENT AND DAUGHTER, BIRDIE ROBERT - [phone] [UPDATED ON 8/22]    # GI AND DVT PPX .

## 2021-08-22 NOTE — PROGRESS NOTE ADULT - SUBJECTIVE AND OBJECTIVE BOX
Podiatry Interval HPI: Patient was seen resting comfortably in bed. Patient is AAOx3. S/p R fifth partial ray amputation. Patient states he feels a little pain to the R foot but is tolerable. Pt denies any overnight events. Denies N/V/F/C/SOB. No other pedal complaints.     Podiatry HPI: Podiatry consulted regarding 77 yo male patient who presents to ED with a chief complaint of right foot plantar ulcer. Patient states that he noticed the ulcer about 3 weeks ago. Denies any trauma or injury. He recalls stepping on a dog treat and isn't sure if that caused an opening on the bottom of his right foot. Patient states that he has been diagnosed with diabetes for a long time. Patient reports that the ulcer became painful and can hardly ambulate due to pain. Pt rates the pain 10/10 on the VAS. Denies other pedal complaints. Denies constitutional symptoms of N/V/C/F/Sob.     HPI:  Patient is a 76yoM, ambulates and performs ADL independently, w/ PMH of CAD (s/p CABG >10yrs ago), DM, HTN, and HLD, presents with right foot pain x2 weeks. He reports 10/10, progressive, intermittent, sharp, non-radiating, right foot pain. He states stepping on a dog treat approximately 3 weeks ago, which he notice a development of wound at the time. Right foot pain developed a week later. It was mild initially, but progressive has gotten worsen. Patient has been non-compliant with medications for the past 2 months, because he has been feeling great. He denies fever, chills, n/v, dizziness, chest pain, sob, abdominal pain, urinary or bowel movement changes.  (14 Aug 2021 13:11)      Medications ampicillin/sulbactam  IVPB 3 Gram(s) IV Intermittent every 8 hours  atorvastatin 40 milliGRAM(s) Oral at bedtime  cyanocobalamin 1000 MICROGram(s) Oral daily  dextrose 5%. 1000 milliLiter(s) IV Continuous <Continuous>  dextrose 5%. 1000 milliLiter(s) IV Continuous <Continuous>  ergocalciferol 60062 Unit(s) Oral <User Schedule>  ferrous    sulfate 325 milliGRAM(s) Oral daily  glucagon  Injectable 1 milliGRAM(s) IntraMuscular once  insulin lispro (ADMELOG) corrective regimen sliding scale   SubCutaneous Before meals and at bedtime  lactated ringers. 1000 milliLiter(s) IV Continuous <Continuous>  metoprolol succinate  milliGRAM(s) Oral daily  NIFEdipine XL 60 milliGRAM(s) Oral daily  oxycodone    5 mG/acetaminophen 325 mG 1 Tablet(s) Oral every 8 hours    FH: Family history of acute myocardial infarction (Father)    Family history of diabetes mellitus (Mother, Sibling)    Family history of hypertension (Mother, Sibling)    Family history of hyperlipidemia (Mother, Sibling)    ,   PMH: HTN (hypertension)    HLD (hyperlipidemia)    DM (diabetes mellitus)    CAD (coronary artery disease)    Glaucoma, angle-closure       PSH: No significant past surgical history    S/P CABG (coronary artery bypass graft)    History of thyroid surgery        Labs                          11.9   7.07  )-----------( 235      ( 22 Aug 2021 07:15 )             35.2      08-22    140  |  108  |  21<H>  ----------------------------<  173<H>  4.0   |  26  |  1.41<H>    Ca    8.6      22 Aug 2021 07:15  Phos  3.2     08-22  Mg     2.4     08-22    TPro  7.2  /  Alb  3.1<L>  /  TBili  0.5  /  DBili  x   /  AST  26  /  ALT  21  /  AlkPhos  118  08-22     Vital Signs Last 24 Hrs  T(C): 36.8 (22 Aug 2021 04:52), Max: 37.4 (21 Aug 2021 13:47)  T(F): 98.2 (22 Aug 2021 04:52), Max: 99.3 (21 Aug 2021 13:47)  HR: 74 (22 Aug 2021 04:52) (68 - 76)  BP: 169/78 (22 Aug 2021 04:52) (147/65 - 169/78)  BP(mean): --  RR: 18 (22 Aug 2021 04:52) (16 - 18)  SpO2: 96% (22 Aug 2021 04:52) (96% - 97%)  Sedimentation Rate, Erythrocyte: 44 mm/Hr (08-15-21 @ 06:18)         C-Reactive Protein, Serum: 67 mg/L (08-15-21 @ 11:55)   WBC Count: 7.07 K/uL (08-22-21 @ 07:15)    CAPILLARY BLOOD GLUCOSE      POCT blood Glucose.: 131 mg/dL (22 Aug 2021 11:36)  POCT Blood Glucose.: 162 mg/dL (22 Aug 2021 07:56)  POCT Blood Glucose.: 216 mg/dL (21 Aug 2021 21:25)  POCT Blood Glucose.: 122 mg/dL (21 Aug 2021 17:12)      ROS: All others negative unless otherwise stated in the HPI      PHYSICAL EXAM  GEN: SHER ROBERT is a pleasant well-nourished, well developed 76y Male in no acute distress, alert awake, and oriented to person, place and time.   LE Focused:    Vasc: DP/PT 2/4 on the left, 1/4 on the right, CFT brisk to all digits, TG warm to warm on the LLE, localized edema and erythema noted to R surgical site consistent with post op course.   Derm: Incision site located to R 5th digit. Sutures well adhered. Erythema noted along the suture incision site. Distal portion of the suture site look ischemic.  No wound dehiscence noted to surgical site. No drainage noted. No clinical signs of infection present.    Neuro: Protective sensation and epicritic sensation grossly diminished b/l  MSK: Pain localized to the lateral aspect of the right forefoot, able to move extremities in all compartments       Imaging:   Xray  EXAM:  XR FOOT COMP MIN 3 VIEWS RT                          PROCEDURE DATE:  08/14/2021      INTERPRETATION:  Diabetic foot ulcer cellulitis.    3 views right foot.    IMPRESSION: No fracture dislocation or focal bone destruction. No unusual periosteal reaction. Joint spaces preserved. Calcaneal enthesopathy. Extensive calcification/ossification in the region of the plantar fascia . Small vessel calcification. Diffuse soft tissue swelling may reflect cellulitis. No soft tissue gas.      MRI  EXAM:  MR FOOT RT                          PROCEDURE DATE:  08/16/2021      INTERPRETATION:  EXAMINATION: MRI of the right forefoot without contrast    CLINICAL INFORMATION: Right foot ulcer. Evaluate for osteomyelitis.    TECHNIQUE: Multiplanar, multisequential MR imaging was performed.    FINDINGS: The exam is significantly limited by motion artifact. There is a cutaneous wound along the plantar aspect of the fifth metatarsal head with adjacent cellulitic change. There is high T2 and low T1 marrow signal within the plantar aspect of the fifth metatarsal head, consistent with osteomyelitis. There is diffuse fatty atrophy and high T2 signal of musculature, consistent with denervation. There is dorsal subcutaneous soft tissue edema.    IMPRESSION: Limited exam, as above. Cutaneous wound along the plantar aspect of the fifth digit with adjacent osteomyelitis of the fifth metatarsal head.      SIMON  EXAM:  US PHYSIOL LWR EXT 3+ LEV BI                            PROCEDURE DATE:  08/16/2021          INTERPRETATION:  Clinical indication: Diabetes with right foot ulcer    COMPARISON: None    FINDINGS: There are biphasic waveforms bilaterally, dampened at the level of the metatarsals. No abnormal segmental pressure gradient.    Right SIMON = 0.82  Left SIMON = 0.85    IMPRESSION: ABIs compatible with mild peripheral vascular disease.        Microbiology  Culture Results:   Moderate Streptococcus agalactiae (Group B) isolated   Group B streptococci are susceptible to ampicillin,   penicillin and cefazolin, but may be resistant to   erythromycin and clindamycin.   Recommendations for intrapartum prophylaxis for Group B   streptococci are penicillin or ampicillin.   Normal skin filiberto isolated (08.14.21 @ 21:08)

## 2021-08-22 NOTE — PROGRESS NOTE ADULT - ASSESSMENT
Patient is a 76y old  Male who ambulates and performs ADL independently, w/ PMH of CAD (s/p CABG >10yrs ago), DM, HTN, and HLD, now presents to the ER for evaluation of right foot pain x2 weeks.  He states stepping on a dog treat approximately 3 weeks ago, which he notice a development of wound at the time. Right foot pain developed a week later. It was mild initially, but progressive has gotten worsen. Patient has been non-compliant with medications for the past 2 months, because he has been feeling great. ON admission, he found to have No fever but tachycardia. He has started on Unasyn and Vancomycin, and the ID consult requested to assist with further evaluation and antibiotic management.    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 Follow up pathology result  for clear margin  3. Continue Unasyn since wound culture grew Streptococcus agalactiae  4. Monitor kidney function  5. Wound care as per Podiatry     Attending Attestation:    Spent more than 35 minutes on total encounter, more than 50 % of the visit was spent counseling and/or coordinating care by the Attending physician. Patient is a 76y old  Male who ambulates and performs ADL independently, w/ PMH of CAD (s/p CABG >10yrs ago), DM, HTN, and HLD, now presents to the ER for evaluation of right foot pain x2 weeks.  He states stepping on a dog treat approximately 3 weeks ago, which he notice a development of wound at the time. Right foot pain developed a week later. It was mild initially, but progressive has gotten worsen. Patient has been non-compliant with medications for the past 2 months, because he has been feeling great. ON admission, he found to have No fever but tachycardia. He has started on Unasyn and Vancomycin, and the ID consult requested to assist with further evaluation and antibiotic management.    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 Please cass Pathology Dept. for  pathology result  for clear margin  2. Continue Unasyn since wound culture grew Streptococcus agalactiae  3.. Monitor kidney function  4. Wound care as per Podiatry     Attending Attestation:    Spent more than 35 minutes on total encounter, more than 50 % of the visit was spent counseling and/or coordinating care by the Attending physician.

## 2021-08-22 NOTE — PROGRESS NOTE ADULT - SUBJECTIVE AND OBJECTIVE BOX
C A R D I O L O G Y  **********************************     DATE OF SERVICE: 08-22-21    Patient denies chest pain or shortness of breath.   Review of symptoms otherwise negative.         ampicillin/sulbactam  IVPB 3 Gram(s) IV Intermittent every 8 hours  atorvastatin 40 milliGRAM(s) Oral at bedtime  cyanocobalamin 1000 MICROGram(s) Oral daily  dextrose 5%. 1000 milliLiter(s) IV Continuous <Continuous>  dextrose 5%. 1000 milliLiter(s) IV Continuous <Continuous>  ergocalciferol 84271 Unit(s) Oral <User Schedule>  ferrous    sulfate 325 milliGRAM(s) Oral daily  glucagon  Injectable 1 milliGRAM(s) IntraMuscular once  insulin lispro (ADMELOG) corrective regimen sliding scale   SubCutaneous Before meals and at bedtime  lactated ringers. 1000 milliLiter(s) IV Continuous <Continuous>  metoprolol succinate  milliGRAM(s) Oral daily  NIFEdipine XL 60 milliGRAM(s) Oral daily  oxycodone    5 mG/acetaminophen 325 mG 1 Tablet(s) Oral every 8 hours                            11.9   7.07  )-----------( 235      ( 22 Aug 2021 07:15 )             35.2       Hemoglobin: 11.9 g/dL (08-22 @ 07:15)  Hemoglobin: 11.5 g/dL (08-21 @ 06:24)  Hemoglobin: 11.2 g/dL (08-20 @ 06:08)  Hemoglobin: 11.6 g/dL (08-19 @ 07:02)  Hemoglobin: 12.2 g/dL (08-18 @ 06:09)      08-22    140  |  108  |  21<H>  ----------------------------<  173<H>  4.0   |  26  |  1.41<H>    Ca    8.6      22 Aug 2021 07:15  Phos  3.2     08-22  Mg     2.4     08-22    TPro  7.2  /  Alb  3.1<L>  /  TBili  0.5  /  DBili  x   /  AST  26  /  ALT  21  /  AlkPhos  118  08-22    Creatinine Trend: 1.41<--, 1.37<--, 1.30<--, 1.34<--, 1.42<--, 1.52<--    COAGS:           T(C): 36.8 (08-22-21 @ 04:52), Max: 37.4 (08-21-21 @ 13:47)  HR: 74 (08-22-21 @ 04:52) (68 - 76)  BP: 169/78 (08-22-21 @ 04:52) (147/65 - 169/78)  RR: 18 (08-22-21 @ 04:52) (16 - 18)  SpO2: 96% (08-22-21 @ 04:52) (96% - 97%)  Wt(kg): --    I&O's Summary    21 Aug 2021 07:01  -  22 Aug 2021 07:00  --------------------------------------------------------  IN: 240 mL / OUT: 600 mL / NET: -360 mL        HEENT:  (-)icterus (-)pallor  CV: N S1 S2 1/6 TRINIDAD (+)2 Pulses B/l  Resp:  Clear to ausculatation B/L, normal effort  GI: (+) BS Soft, NT, ND  Lymph:  (-)Edema, (-)obvious lymphadenopathy  Skin: Warm to touch, Normal turgor  Psych: Appropriate mood and affect          ASSESSMENT/PLAN: 	76y  Male ambulates and performs ADL independently, w/ PMH of CAD (s/p CABG >10yrs ago), DM, HTN, and HLD, presents with right foot pain x2 weeks. Cardiology consulted for management of cad s/p 5th partial ray amputation.    - tolerated procedure  - Echo noted, CINTIA of 1.0 cm moderate to severe AS, will need SABIHA and AV w/u once acute issues resolve.  He denies CP, SOB or LOC  - no clinical CHF  - Abx per ID  - cont ASA, statin BB

## 2021-08-22 NOTE — PROGRESS NOTE ADULT - SUBJECTIVE AND OBJECTIVE BOX
Patient is seen and examined at the bed side, is afebrile. The intra-op culture has no growth to date.       REVIEW OF SYSTEMS: All other review systems are negative      ALLERGIES: No Known Allergies      Vital Signs Last 24 Hrs  T(C): 36.7 (22 Aug 2021 13:29), Max: 37.3 (21 Aug 2021 21:18)  T(F): 98.1 (22 Aug 2021 13:29), Max: 99.2 (21 Aug 2021 21:18)  HR: 70 (22 Aug 2021 13:29) (70 - 76)  BP: 154/54 (22 Aug 2021 13:29) (152/71 - 169/78)  BP(mean): 78 (22 Aug 2021 13:29) (78 - 78)  RR: 18 (22 Aug 2021 13:29) (16 - 18)  SpO2: 100% (22 Aug 2021 13:29) (96% - 100%)      PHYSICAL EXAM:  GENERAL: Not in distress   CHEST/LUNG:  Not using accessory muscles   HEART: s1 and s2 present  ABDOMEN:  Nontender and  Nondistended  EXTREMITIES: Right foot bandage in placed  CNS: Awake and Alert        LABS:                        11.9   7.07  )-----------( 235      ( 22 Aug 2021 07:15 )             35.2                           11.5   6.91  )-----------( 234      ( 21 Aug 2021 06:24 )             34.3       08-22    140  |  108  |  21<H>  ----------------------------<  173<H>  4.0   |  26  |  1.41<H>    Ca    8.6      22 Aug 2021 07:15  Phos  3.2     08-22  Mg     2.4     08-22    TPro  7.2  /  Alb  3.1<L>  /  TBili  0.5  /  DBili  x   /  AST  26  /  ALT  21  /  AlkPhos  118  08-22 08-15    137  |  104  |  25<H>  ----------------------------<  245<H>  4.0   |  24  |  1.62<H>    Ca    8.6      15 Aug 2021 06:18  Phos  3.2     08-15  Mg     2.3     08-15    TPro  7.1  /  Alb  3.4<L>  /  TBili  0.6  /  DBili  x   /  AST  11  /  ALT  16  /  AlkPhos  145<H>  08-15        CAPILLARY BLOOD GLUCOSE  POCT Blood Glucose.: 305 mg/dL (14 Aug 2021 16:20)  POCT Blood Glucose.: 281 mg/dL (14 Aug 2021 07:29)        MEDICATIONS  (STANDING):    ampicillin/sulbactam  IVPB 3 Gram(s) IV Intermittent every 8 hours  atorvastatin 40 milliGRAM(s) Oral at bedtime  cyanocobalamin 1000 MICROGram(s) Oral daily  dextrose 5%. 1000 milliLiter(s) (50 mL/Hr) IV Continuous <Continuous>  dextrose 5%. 1000 milliLiter(s) (100 mL/Hr) IV Continuous <Continuous>  ergocalciferol 20540 Unit(s) Oral <User Schedule>  ferrous    sulfate 325 milliGRAM(s) Oral daily  glucagon  Injectable 1 milliGRAM(s) IntraMuscular once  insulin lispro (ADMELOG) corrective regimen sliding scale   SubCutaneous Before meals and at bedtime  lactated ringers. 1000 milliLiter(s) (75 mL/Hr) IV Continuous <Continuous>  metoprolol succinate  milliGRAM(s) Oral daily  NIFEdipine XL 60 milliGRAM(s) Oral daily  oxycodone    5 mG/acetaminophen 325 mG 1 Tablet(s) Oral every 8 hours        RADIOLOGY & ADDITIONAL TESTS:    8/16/21: US Renal (08.16.21 @ 17:02) Normal renal ultrasound.    8/16/21: MR Foot No Cont, Right (08.16.21 @ 16:52) The exam is significantly limited by motion artifact. There is a cutaneous wound along the plantar aspect of the fifth metatarsal head with adjacent cellulitic change. There is high T2 and low T1 marrow signal within the plantar aspect of the fifth metatarsal head, consistent with osteomyelitis. There is diffuse fatty atrophy and high T2 signal of musculature, consistent with denervation. There is dorsal subcutaneous soft tissue edema.      < from: Xray Foot AP + Lateral + Oblique, Right (08.14.21 @ 09:51) >  IMPRESSION: No fracture dislocation or focal bone destruction. No unusual periosteal reaction. Joint spaces preserved. Calcaneal enthesopathy. Extensive calcification/ossification in the region of the plantar fascia . Small vessel calcification. Diffuse soft tissue swelling may reflect cellulitis. No soft tissue gas.        MICROBIOLOGY DATA:    Culture - Tissue with Gram Stain (08.20.21 @ 03:59)   Gram Stain: No polymorphonuclear leukocytes seen per low power field   No organisms seen per oil power field   Specimen Source: Bone R 5th metatarsal bone clean ma   Culture Results: No growth to date.        Culture - Blood (08.14.21 @ 21:09)   Specimen Source: .Blood Blood-Venous   Culture Results: No growth to date.     Culture - Blood (08.14.21 @ 21:09)   Specimen Source: .Blood Blood-Venous   Culture Results: No growth to date.     COVID-19 David Domain Antibody (08.15.21 @ 11:30)   COVID-19 David Domain Antibody Result: >250.00    Culture - Surgical Swab (08.14.21 @ 21:08)   Specimen Source: .Surgical Swab Right foot plantar wound   Culture Results:   Moderate Streptococcus agalactiae (Group B) isolated   Group B streptococci are susceptible to ampicillin,   penicillin and cefazolin, but may be resistant to   erythromycin and clindamycin.   Recommendations for intrapartum prophylaxis for Group B   streptococci are penicillin or ampicillin.   Normal skin filiberto isolated     COVID-19 PCR . (08.14.21 @ 09:41)   COVID-19 PCR: NotDetec                 Patient is seen and examined at the bed side, is afebrile. The intra-op culture remains negative to date.       REVIEW OF SYSTEMS: All other review systems are negative      ALLERGIES: No Known Allergies      Vital Signs Last 24 Hrs  T(C): 36.7 (22 Aug 2021 13:29), Max: 37.3 (21 Aug 2021 21:18)  T(F): 98.1 (22 Aug 2021 13:29), Max: 99.2 (21 Aug 2021 21:18)  HR: 70 (22 Aug 2021 13:29) (70 - 76)  BP: 154/54 (22 Aug 2021 13:29) (152/71 - 169/78)  BP(mean): 78 (22 Aug 2021 13:29) (78 - 78)  RR: 18 (22 Aug 2021 13:29) (16 - 18)  SpO2: 100% (22 Aug 2021 13:29) (96% - 100%)      PHYSICAL EXAM:  GENERAL: Not in distress   CHEST/LUNG:  Not using accessory muscles   HEART: s1 and s2 present  ABDOMEN:  Nontender and  Nondistended  EXTREMITIES: Right foot bandage in placed  CNS: Awake and Alert        LABS:                        11.9   7.07  )-----------( 235      ( 22 Aug 2021 07:15 )             35.2                           11.5   6.91  )-----------( 234      ( 21 Aug 2021 06:24 )             34.3       08-22    140  |  108  |  21<H>  ----------------------------<  173<H>  4.0   |  26  |  1.41<H>    Ca    8.6      22 Aug 2021 07:15  Phos  3.2     08-22  Mg     2.4     08-22    TPro  7.2  /  Alb  3.1<L>  /  TBili  0.5  /  DBili  x   /  AST  26  /  ALT  21  /  AlkPhos  118  08-22      08-15    137  |  104  |  25<H>  ----------------------------<  245<H>  4.0   |  24  |  1.62<H>    Ca    8.6      15 Aug 2021 06:18  Phos  3.2     08-15  Mg     2.3     08-15    TPro  7.1  /  Alb  3.4<L>  /  TBili  0.6  /  DBili  x   /  AST  11  /  ALT  16  /  AlkPhos  145<H>  08-15      CAPILLARY BLOOD GLUCOSE  POCT Blood Glucose.: 305 mg/dL (14 Aug 2021 16:20)  POCT Blood Glucose.: 281 mg/dL (14 Aug 2021 07:29)      MEDICATIONS  (STANDING):    ampicillin/sulbactam  IVPB 3 Gram(s) IV Intermittent every 8 hours  atorvastatin 40 milliGRAM(s) Oral at bedtime  cyanocobalamin 1000 MICROGram(s) Oral daily  dextrose 5%. 1000 milliLiter(s) (50 mL/Hr) IV Continuous <Continuous>  dextrose 5%. 1000 milliLiter(s) (100 mL/Hr) IV Continuous <Continuous>  ergocalciferol 86978 Unit(s) Oral <User Schedule>  ferrous    sulfate 325 milliGRAM(s) Oral daily  glucagon  Injectable 1 milliGRAM(s) IntraMuscular once  insulin lispro (ADMELOG) corrective regimen sliding scale   SubCutaneous Before meals and at bedtime  lactated ringers. 1000 milliLiter(s) (75 mL/Hr) IV Continuous <Continuous>  metoprolol succinate  milliGRAM(s) Oral daily  NIFEdipine XL 60 milliGRAM(s) Oral daily  oxycodone    5 mG/acetaminophen 325 mG 1 Tablet(s) Oral every 8 hours        RADIOLOGY & ADDITIONAL TESTS:    8/16/21: US Renal (08.16.21 @ 17:02) Normal renal ultrasound.    8/16/21: MR Foot No Cont, Right (08.16.21 @ 16:52) The exam is significantly limited by motion artifact. There is a cutaneous wound along the plantar aspect of the fifth metatarsal head with adjacent cellulitic change. There is high T2 and low T1 marrow signal within the plantar aspect of the fifth metatarsal head, consistent with osteomyelitis. There is diffuse fatty atrophy and high T2 signal of musculature, consistent with denervation. There is dorsal subcutaneous soft tissue edema.      < from: Xray Foot AP + Lateral + Oblique, Right (08.14.21 @ 09:51) >  IMPRESSION: No fracture dislocation or focal bone destruction. No unusual periosteal reaction. Joint spaces preserved. Calcaneal enthesopathy. Extensive calcification/ossification in the region of the plantar fascia . Small vessel calcification. Diffuse soft tissue swelling may reflect cellulitis. No soft tissue gas.        MICROBIOLOGY DATA:    Culture - Tissue with Gram Stain (08.20.21 @ 03:59)   Gram Stain: No polymorphonuclear leukocytes seen per low power field   No organisms seen per oil power field   Specimen Source: Bone R 5th metatarsal bone clean ma   Culture Results: No growth to date.        Culture - Blood (08.14.21 @ 21:09)   Specimen Source: .Blood Blood-Venous   Culture Results: No growth to date.     Culture - Blood (08.14.21 @ 21:09)   Specimen Source: .Blood Blood-Venous   Culture Results: No growth to date.     COVID-19 David Domain Antibody (08.15.21 @ 11:30)   COVID-19 David Domain Antibody Result: >250.00    Culture - Surgical Swab (08.14.21 @ 21:08)   Specimen Source: .Surgical Swab Right foot plantar wound   Culture Results:   Moderate Streptococcus agalactiae (Group B) isolated   Group B streptococci are susceptible to ampicillin,   penicillin and cefazolin, but may be resistant to   erythromycin and clindamycin.   Recommendations for intrapartum prophylaxis for Group B   streptococci are penicillin or ampicillin.   Normal skin filiberto isolated     COVID-19 PCR . (08.14.21 @ 09:41)   COVID-19 PCR: NotDetec

## 2021-08-23 DIAGNOSIS — E55.9 VITAMIN D DEFICIENCY, UNSPECIFIED: ICD-10-CM

## 2021-08-23 DIAGNOSIS — E11.621 TYPE 2 DIABETES MELLITUS WITH FOOT ULCER: ICD-10-CM

## 2021-08-23 LAB
ALBUMIN SERPL ELPH-MCNC: 2.8 G/DL — LOW (ref 3.5–5)
ALP SERPL-CCNC: 111 U/L — SIGNIFICANT CHANGE UP (ref 40–120)
ALT FLD-CCNC: 21 U/L DA — SIGNIFICANT CHANGE UP (ref 10–60)
ANION GAP SERPL CALC-SCNC: 7 MMOL/L — SIGNIFICANT CHANGE UP (ref 5–17)
AST SERPL-CCNC: 26 U/L — SIGNIFICANT CHANGE UP (ref 10–40)
BILIRUB SERPL-MCNC: 0.5 MG/DL — SIGNIFICANT CHANGE UP (ref 0.2–1.2)
BUN SERPL-MCNC: 16 MG/DL — SIGNIFICANT CHANGE UP (ref 7–18)
CALCIUM SERPL-MCNC: 8.8 MG/DL — SIGNIFICANT CHANGE UP (ref 8.4–10.5)
CHLORIDE SERPL-SCNC: 107 MMOL/L — SIGNIFICANT CHANGE UP (ref 96–108)
CO2 SERPL-SCNC: 26 MMOL/L — SIGNIFICANT CHANGE UP (ref 22–31)
CREAT SERPL-MCNC: 1.22 MG/DL — SIGNIFICANT CHANGE UP (ref 0.5–1.3)
GLUCOSE BLDC GLUCOMTR-MCNC: 121 MG/DL — HIGH (ref 70–99)
GLUCOSE BLDC GLUCOMTR-MCNC: 126 MG/DL — HIGH (ref 70–99)
GLUCOSE BLDC GLUCOMTR-MCNC: 131 MG/DL — HIGH (ref 70–99)
GLUCOSE BLDC GLUCOMTR-MCNC: 140 MG/DL — HIGH (ref 70–99)
GLUCOSE SERPL-MCNC: 131 MG/DL — HIGH (ref 70–99)
HCT VFR BLD CALC: 34.6 % — LOW (ref 39–50)
HGB BLD-MCNC: 11.6 G/DL — LOW (ref 13–17)
MAGNESIUM SERPL-MCNC: 2.4 MG/DL — SIGNIFICANT CHANGE UP (ref 1.6–2.6)
MCHC RBC-ENTMCNC: 29.3 PG — SIGNIFICANT CHANGE UP (ref 27–34)
MCHC RBC-ENTMCNC: 33.5 GM/DL — SIGNIFICANT CHANGE UP (ref 32–36)
MCV RBC AUTO: 87.4 FL — SIGNIFICANT CHANGE UP (ref 80–100)
NRBC # BLD: 0 /100 WBCS — SIGNIFICANT CHANGE UP (ref 0–0)
PHOSPHATE SERPL-MCNC: 2.9 MG/DL — SIGNIFICANT CHANGE UP (ref 2.5–4.5)
PLATELET # BLD AUTO: 227 K/UL — SIGNIFICANT CHANGE UP (ref 150–400)
POTASSIUM SERPL-MCNC: 3.7 MMOL/L — SIGNIFICANT CHANGE UP (ref 3.5–5.3)
POTASSIUM SERPL-SCNC: 3.7 MMOL/L — SIGNIFICANT CHANGE UP (ref 3.5–5.3)
PROT SERPL-MCNC: 7 G/DL — SIGNIFICANT CHANGE UP (ref 6–8.3)
RBC # BLD: 3.96 M/UL — LOW (ref 4.2–5.8)
RBC # FLD: 12.5 % — SIGNIFICANT CHANGE UP (ref 10.3–14.5)
SODIUM SERPL-SCNC: 140 MMOL/L — SIGNIFICANT CHANGE UP (ref 135–145)
WBC # BLD: 6.13 K/UL — SIGNIFICANT CHANGE UP (ref 3.8–10.5)
WBC # FLD AUTO: 6.13 K/UL — SIGNIFICANT CHANGE UP (ref 3.8–10.5)

## 2021-08-23 RX ORDER — ASPIRIN/CALCIUM CARB/MAGNESIUM 324 MG
81 TABLET ORAL DAILY
Refills: 0 | Status: DISCONTINUED | OUTPATIENT
Start: 2021-08-23 | End: 2021-08-24

## 2021-08-23 RX ADMIN — AMPICILLIN SODIUM AND SULBACTAM SODIUM 200 GRAM(S): 250; 125 INJECTION, POWDER, FOR SUSPENSION INTRAMUSCULAR; INTRAVENOUS at 06:07

## 2021-08-23 RX ADMIN — Medication 81 MILLIGRAM(S): at 21:41

## 2021-08-23 RX ADMIN — OXYCODONE AND ACETAMINOPHEN 1 TABLET(S): 5; 325 TABLET ORAL at 21:41

## 2021-08-23 RX ADMIN — OXYCODONE AND ACETAMINOPHEN 1 TABLET(S): 5; 325 TABLET ORAL at 06:07

## 2021-08-23 RX ADMIN — Medication 1 INCH(S): at 01:36

## 2021-08-23 RX ADMIN — ATORVASTATIN CALCIUM 40 MILLIGRAM(S): 80 TABLET, FILM COATED ORAL at 21:41

## 2021-08-23 RX ADMIN — OXYCODONE AND ACETAMINOPHEN 1 TABLET(S): 5; 325 TABLET ORAL at 07:23

## 2021-08-23 RX ADMIN — PREGABALIN 1000 MICROGRAM(S): 225 CAPSULE ORAL at 12:09

## 2021-08-23 RX ADMIN — AMPICILLIN SODIUM AND SULBACTAM SODIUM 200 GRAM(S): 250; 125 INJECTION, POWDER, FOR SUSPENSION INTRAMUSCULAR; INTRAVENOUS at 13:43

## 2021-08-23 RX ADMIN — Medication 325 MILLIGRAM(S): at 12:09

## 2021-08-23 RX ADMIN — Medication 60 MILLIGRAM(S): at 06:07

## 2021-08-23 RX ADMIN — OXYCODONE AND ACETAMINOPHEN 1 TABLET(S): 5; 325 TABLET ORAL at 23:11

## 2021-08-23 RX ADMIN — Medication 100 MILLIGRAM(S): at 06:07

## 2021-08-23 RX ADMIN — AMPICILLIN SODIUM AND SULBACTAM SODIUM 200 GRAM(S): 250; 125 INJECTION, POWDER, FOR SUSPENSION INTRAMUSCULAR; INTRAVENOUS at 21:41

## 2021-08-23 NOTE — PROGRESS NOTE ADULT - PROBLEM SELECTOR PLAN 4
Started on cholecalciferol - h/o HTN, non-compliant w/ medications; previously on metoprolol 200mg qd, [id number] qd, and isosorbide 30mg qd      c/w nifedipine 60 mg qd and metoprolol at 100 mg daily  Nephro recommending ACEI/ARB once Cr improves

## 2021-08-23 NOTE — PROGRESS NOTE ADULT - SUBJECTIVE AND OBJECTIVE BOX
NEPHROLOGY MEDICAL CARE, [facility] - Dr. Tariq Herrera/ Dr. Lauren Lopez/ Dr. Dre Call/ Dr. Destiny Knott    Patient was seen and examined at bedside.    CC: patient is okay and NAd    Vital Signs Last 24 Hrs  T(C): 36.7 (23 Aug 2021 13:11), Max: 36.7 (23 Aug 2021 05:55)  T(F): 98 (23 Aug 2021 13:11), Max: 98.1 (23 Aug 2021 05:55)  HR: 68 (23 Aug 2021 13:11) (68 - 84)  BP: 158/60 (23 Aug 2021 13:11) (158/60 - 168/67)  BP(mean): --  RR: 18 (23 Aug 2021 13:11) (18 - 18)  SpO2: 98% (23 Aug 2021 13:11) (98% - 99%)    08-22 @ 07:01  -  08-23 @ 07:00  --------------------------------------------------------  IN: 240 mL / OUT: 800 mL / NET: -560 mL        PHYSICAL EXAM:  General: No acute respiratory distress.  Eyes: conjunctiva and sclera clear  ENMT: Atraumatic, Normocephalic, supple, No JVD present. Moist mucous membranes  Respiratory: Clear to percussion bilaterally; No rales, rhonchi, wheezing  Cardiovasular: S1S2+; no r/g; systolic murmur  Gastrointestinal: Soft, Non-tender, Nondistended; Bowel sounds present  Neuro:  Awake, Alert & Oriented X3  Ext:  No edema, No Cyanosis; Rt foot bandage.  Skin: No visible rashes      MEDICATIONS:  MEDICATIONS  (STANDING):  ampicillin/sulbactam  IVPB 3 Gram(s) IV Intermittent every 8 hours  atorvastatin 40 milliGRAM(s) Oral at bedtime  cyanocobalamin 1000 MICROGram(s) Oral daily  dextrose 5%. 1000 milliLiter(s) (50 mL/Hr) IV Continuous <Continuous>  dextrose 5%. 1000 milliLiter(s) (100 mL/Hr) IV Continuous <Continuous>  ergocalciferol 70454 Unit(s) Oral <User Schedule>  ferrous    sulfate 325 milliGRAM(s) Oral daily  glucagon  Injectable 1 milliGRAM(s) IntraMuscular once  insulin lispro (ADMELOG) corrective regimen sliding scale   SubCutaneous Before meals and at bedtime  lactated ringers. 1000 milliLiter(s) (75 mL/Hr) IV Continuous <Continuous>  metoprolol succinate  milliGRAM(s) Oral daily  NIFEdipine XL 60 milliGRAM(s) Oral daily  oxycodone    5 mG/acetaminophen 325 mG 1 Tablet(s) Oral every 8 hours    MEDICATIONS  (PRN):          LABS:                        11.6   6.13  )-----------( 227      ( 23 Aug 2021 06:15 )             34.6     08-23    140  |  107  |  16  ----------------------------<  131<H>  3.7   |  26  |  1.22    Ca    8.8      23 Aug 2021 06:15  Phos  2.9     08-23  Mg     2.4     08-23    TPro  7.0  /  Alb  2.8<L>  /  TBili  0.5  /  DBili  x   /  AST  26  /  ALT  21  /  AlkPhos  111  08-23        Magnesium, Serum: 2.4 mg/dL (08-23 @ 06:15)  Phosphorus Level, Serum: 2.9 mg/dL (08-23 @ 06:15)    Urine studies    PTH and Vit D:

## 2021-08-23 NOTE — PROGRESS NOTE ADULT - PROBLEM SELECTOR PLAN 1
SCr 1.22 today, improved. 1.59 on admission, no baseline available per chart  Cr stable  Placed on IVF  Dr. Herrera consulted Plan: - p/w right foot ulcer and pain; non-complaint w/ medications  - PE: right foot fifth metatarsal plantar ulcer  - WBC wnl  - foot xray cellulitis -> transmetatarsal amputation of 5th distal ray  - probe to bone  - s/p vanco, zosyn  - c/w pain management, dilaudid PRN  - ID recommending Unasyn, wound culture growing GBS -> pending surgical pathology report  - MR foot with + OM  - SIMON PVR with mild peripheral vascular disease -> Dr Boucher F/U    R 5th metatarsal head resection procedure completed by podiatry, Incision site located to R 5th digit. Sutures well adhered. No wound dehiscence noted to surgical site. Sanguinous drainage noted to bandage. No clinical signs of infection present.   surgical pathology collected   C/w unasyn  Will switch to PO Abx once results of surgical pathology are back\    PT recommending STEVAN. 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 Sutures well adhered. No wound dehiscence noted to surgical site. Sanguinous drainage noted to bandage. No clinical signs of infection present.   Percocet q8h for pain  surgical pathology collected   C/w unasyn  Will switch to PO Abx once results of surgical pathology are back  PT recommending STEVAN.

## 2021-08-23 NOTE — PROGRESS NOTE ADULT - PROBLEM SELECTOR PLAN 6
- h/o glaucoma, not on any medications  - monitor for symptom  - recommend outpt f/u - h/o HLD, non-compliant w/ medications  - c/w statin  -Lipid panel WNL

## 2021-08-23 NOTE — PROGRESS NOTE ADULT - SUBJECTIVE AND OBJECTIVE BOX
MS3 Medical Student Progress Note discussed with PGY-1  PGY-1 Progress Note discussed with attending    INTERVAL HPI  - Patient is a 75 y/o M (ambulates and ADL independently) w PMH of CAD (s/p CABG >10yrs ago), DM, HTN, and HLD, presents with right foot pain x2 weeks admitted for diabetic right foot ulcer. Patient was evaluated by infectious disease and started on IV unasyn for foot ulcer (wound culture growing GBS), XR showed cellulitis and MRI with osteomyelitis. Patient was evaluated by podiatry who performed right 5th metatarsal head resection. Patient underwent SIMON/PVR as per vascular evaluation recommendations which showed mild PVD. Patient also noted to have OREN, nephrology was consulted and recommended starting ACEI once Cr improves given significant proteinuria. Paitent was evaluated by cardiology given history of CAD, TTE showed G1DD and moderate to severe AS with recommendation for outpatient SABIHA. For HTN, patient's antihypertensives were titrated as tolerated, isosorbide was held and patient's amlodipine was discontinued, started on nifedipine ER 60 mg and BP was well controlled prior to discharge.    OVERNIGHT EVENTS:   Patient  complaining of pain , controlled. Otherwise no events or new complaints noted.     REVIEW OF SYSTEMS:  CONSTITUTIONAL: No fever, weight loss, or fatigue  RESPIRATORY: No cough, wheezing, chills or hemoptysis; No shortness of breath  CARDIOVASCULAR: No chest pain, palpitations, dizziness, or leg swelling  GASTROINTESTINAL: No abdominal pain. No nausea, vomiting, or hematemesis; No diarrhea or constipation. No melena or hematochezia.  GENITOURINARY: No dysuria or hematuria, urinary frequency  NEUROLOGICAL: No headaches, memory loss, loss of strength, numbness, or tremors  SKIN: No itching, burning, rashes    MEDICATIONS  (STANDING):  ampicillin/sulbactam  IVPB 3 Gram(s) IV Intermittent every 8 hours  atorvastatin 40 milliGRAM(s) Oral at bedtime  cyanocobalamin 1000 MICROGram(s) Oral daily  dextrose 5%. 1000 milliLiter(s) (50 mL/Hr) IV Continuous <Continuous>  dextrose 5%. 1000 milliLiter(s) (100 mL/Hr) IV Continuous <Continuous>  ergocalciferol 58011 Unit(s) Oral <User Schedule>  ferrous    sulfate 325 milliGRAM(s) Oral daily  glucagon  Injectable 1 milliGRAM(s) IntraMuscular once  insulin lispro (ADMELOG) corrective regimen sliding scale   SubCutaneous Before meals and at bedtime  lactated ringers. 1000 milliLiter(s) (75 mL/Hr) IV Continuous <Continuous>  metoprolol succinate  milliGRAM(s) Oral daily  NIFEdipine XL 60 milliGRAM(s) Oral daily  oxycodone    5 mG/acetaminophen 325 mG 1 Tablet(s) Oral every 8 hours    MEDICATIONS  (PRN):      Vital Signs Last 24 Hrs  T(C): 37.4 (21 Aug 2021 13:47), Max: 37.5 (21 Aug 2021 04:58)  T(F): 99.3 (21 Aug 2021 13:47), Max: 99.5 (21 Aug 2021 04:58)  HR: 68 (21 Aug 2021 13:47) (68 - 72)  BP: 147/65 (21 Aug 2021 13:47) (126/42 - 158/60)  BP(mean): --  RR: 16 (21 Aug 2021 13:47) (16 - 18)  SpO2: 97% (21 Aug 2021 13:47) (97% - 97%)    PHYSICAL EXAMINATION:  GENERAL: NAD, well built  HEAD:  Atraumatic, Normocephalic  EYES:  conjunctiva and sclera clear  NECK: Supple, No JVD, Normal thyroid  CHEST/LUNG: Clear to auscultation. Clear to percussion bilaterally; No rales, rhonchi, wheezing, or rubs  HEART: Regular rate and rhythm; No murmurs, rubs, or gallops  ABDOMEN: Soft, Nontender, Nondistended; Bowel sounds present, no pain or masses on palpation  NERVOUS SYSTEM:  Alert & Oriented X3  : voiding well  EXTREMITIES:  2+ Peripheral Pulses, No clubbing, cyanosis, or edema  SKIN: Rt foot in bandage, warm dry                          11.5   6.91  )-----------( 234      ( 21 Aug 2021 06:24 )             34.3     08-21    139  |  107  |  20<H>  ----------------------------<  118<H>  3.7   |  25  |  1.37<H>    Ca    8.4      21 Aug 2021 06:24  Phos  3.1     08-21  Mg     2.3     08-21    TPro  6.9  /  Alb  2.5<L>  /  TBili  0.6  /  DBili  x   /  AST  28  /  ALT  21  /  AlkPhos  113  08-21    LIVER FUNCTIONS - ( 21 Aug 2021 06:24 )  Alb: 2.5 g/dL / Pro: 6.9 g/dL / ALK PHOS: 113 U/L / ALT: 21 U/L DA / AST: 28 U/L / GGT: x                   I&O's Summary    20 Aug 2021 07:01  -  21 Aug 2021 07:00  --------------------------------------------------------  IN: 0 mL / OUT: 400 mL / NET: -400 mL          Culture - Tissue with Gram Stain (collected 20 Aug 2021 03:59)  Source: Bone R 5th metatarsal bone clean ma  Gram Stain (20 Aug 2021 07:19):    No polymorphonuclear leukocytes seen per low power field    No organisms seen per oil power field  Preliminary Report (21 Aug 2021 08:42):    No growth to date.        CAPILLARY BLOOD GLUCOSE      RADIOLOGY & ADDITIONAL TESTS:                           MS3 Medical Student Progress Note discussed with PGY-1  PGY-1 Progress Note discussed with attending    INTERVAL HPI  - Patient is a 77 y/o M (ambulates and ADL independently) w PMH of CAD (s/p CABG >10yrs ago), DM, HTN, and HLD, presents with right foot pain x2 weeks admitted for diabetic right foot ulcer. Patient was evaluated by infectious disease and started on IV unasyn for foot ulcer (wound culture growing GBS), XR showed cellulitis and MRI with osteomyelitis. Patient was evaluated by podiatry who performed right 5th metatarsal head resection. Patient underwent SIMON/PVR as per vascular evaluation recommendations which showed mild PVD. Patient also noted to have OREN, nephrology was consulted and recommended starting ACEI once Cr improves given significant proteinuria. Paitent was evaluated by cardiology given history of CAD, TTE showed G1DD and moderate to severe AS with recommendation for outpatient SABIHA. For HTN, patient's antihypertensives were titrated as tolerated, isosorbide was held and patient's amlodipine was discontinued, started on nifedipine ER 60 mg and BP was well controlled prior to discharge. Patient disposition pending.     OVERNIGHT EVENTS:   Patient complaining of pain , controlled. Otherwise no events or new complaints noted.     REVIEW OF SYSTEMS:  CONSTITUTIONAL: No fever, weight loss, or fatigue  RESPIRATORY: No cough, wheezing, chills or hemoptysis; No shortness of breath  CARDIOVASCULAR: No chest pain, palpitations, dizziness, or leg swelling  GASTROINTESTINAL: No abdominal pain. No nausea, vomiting, or hematemesis; No diarrhea or constipation. No melena or hematochezia.  GENITOURINARY: No dysuria or hematuria, urinary frequency  NEUROLOGICAL: No headaches, memory loss, loss of strength, numbness, or tremors  SKIN: No itching, burning, rashes    MEDICATIONS  (STANDING):  ampicillin/sulbactam  IVPB 3 Gram(s) IV Intermittent every 8 hours  atorvastatin 40 milliGRAM(s) Oral at bedtime  cyanocobalamin 1000 MICROGram(s) Oral daily  dextrose 5%. 1000 milliLiter(s) (50 mL/Hr) IV Continuous <Continuous>  dextrose 5%. 1000 milliLiter(s) (100 mL/Hr) IV Continuous <Continuous>  ergocalciferol 33565 Unit(s) Oral <User Schedule>  ferrous    sulfate 325 milliGRAM(s) Oral daily  glucagon  Injectable 1 milliGRAM(s) IntraMuscular once  insulin lispro (ADMELOG) corrective regimen sliding scale   SubCutaneous Before meals and at bedtime  lactated ringers. 1000 milliLiter(s) (75 mL/Hr) IV Continuous <Continuous>  metoprolol succinate  milliGRAM(s) Oral daily  NIFEdipine XL 60 milliGRAM(s) Oral daily  oxycodone    5 mG/acetaminophen 325 mG 1 Tablet(s) Oral every 8 hours    MEDICATIONS  (PRN):      Vital Signs Last 24 Hrs  T(C): 37.4 (21 Aug 2021 13:47), Max: 37.5 (21 Aug 2021 04:58)  T(F): 99.3 (21 Aug 2021 13:47), Max: 99.5 (21 Aug 2021 04:58)  HR: 68 (21 Aug 2021 13:47) (68 - 72)  BP: 147/65 (21 Aug 2021 13:47) (126/42 - 158/60)  BP(mean): --  RR: 16 (21 Aug 2021 13:47) (16 - 18)  SpO2: 97% (21 Aug 2021 13:47) (97% - 97%)    PHYSICAL EXAMINATION:  GENERAL: NAD, well built  HEAD:  Atraumatic, Normocephalic  EYES:  conjunctiva and sclera clear  NECK: Supple, No JVD, Normal thyroid  CHEST/LUNG: Clear to auscultation. Clear to percussion bilaterally; No rales, rhonchi, wheezing, or rubs  HEART: Regular rate and rhythm; No murmurs, rubs, or gallops  ABDOMEN: Soft, Nontender, Nondistended; Bowel sounds present, no pain or masses on palpation  NERVOUS SYSTEM:  Alert & Oriented X3  : voiding well  EXTREMITIES:  2+ Peripheral Pulses, No clubbing, cyanosis, or edema  SKIN: Rt foot in bandage, warm dry                          11.5   6.91  )-----------( 234      ( 21 Aug 2021 06:24 )             34.3     08-21    139  |  107  |  20<H>  ----------------------------<  118<H>  3.7   |  25  |  1.37<H>    Ca    8.4      21 Aug 2021 06:24  Phos  3.1     08-21  Mg     2.3     08-21    TPro  6.9  /  Alb  2.5<L>  /  TBili  0.6  /  DBili  x   /  AST  28  /  ALT  21  /  AlkPhos  113  08-21    LIVER FUNCTIONS - ( 21 Aug 2021 06:24 )  Alb: 2.5 g/dL / Pro: 6.9 g/dL / ALK PHOS: 113 U/L / ALT: 21 U/L DA / AST: 28 U/L / GGT: x                   I&O's Summary    20 Aug 2021 07:01  -  21 Aug 2021 07:00  --------------------------------------------------------  IN: 0 mL / OUT: 400 mL / NET: -400 mL          Culture - Tissue with Gram Stain (collected 20 Aug 2021 03:59)  Source: Bone R 5th metatarsal bone clean ma  Gram Stain (20 Aug 2021 07:19):    No polymorphonuclear leukocytes seen per low power field    No organisms seen per oil power field  Preliminary Report (21 Aug 2021 08:42):    No growth to date.        CAPILLARY BLOOD GLUCOSE      RADIOLOGY & ADDITIONAL TESTS:

## 2021-08-23 NOTE — PROGRESS NOTE ADULT - ASSESSMENT
1. OREN due to pre-renal azotemia due to volume depletion.  -Scr remains stable and unchanged possible baseline and caution with fluids since h/o Severe AS  -urinalysis shows proteinuria; uosm and FeNa is 0.76% and spot protein to creatinine ratio is 1.9gm  - renal sonogram shows no hydronephrosis with Right kidney 12.3cm and Left kidney 11.1cm  -Adjust meds to eGFR and avoid IV Gadolinium contrast,NSAIDs, and phosphate enema.  -Monitor I/O's daily.   -Monitor SMA daily.  2. CKD stage 3 most likely due to diabetic nephropathy and hypertensive nephrosclerosis with proteinuria  -patient has mild component of ckd. presently could be his baseline. would benefit with ACE-I/ARB when renal functions is stable.  -Keep patient euvolemic and renal diet  -Avoid Nephrotoxic Meds/ Agents such as (NSAIDs, IV contrast, Aminoglycosides such as gentamicin, -Gadolinium contrast, Phosphate containing enemas, etc..)  -Adjust Medications according to eGFR  3. HTN: -bp is acceptable. on norvasc. continue bp meds  -titrate bp meds to keep sbp >110 and < 130  4. Mineral Bone Disease:  -phos is okay, Vitamin 25 OH is low and on ergo 50k weekly, and PTH intact around 60  5. Anemia due to iron deficiency,  -iron panel noted and low iron sat, on ferrous tab daily,  folate is okay and  vitamin B12 is low and on vitamin b12 tabs.  -F/u CBC daily  -transfuse if HB < 7.0.  6. Rt foot ulcer: on unasyn and Plan as per podiatry; MRI of foot done and 5th toe head resection s/p 8/19.    Discussed with patient in detail regarding the renal plan and care  Discussed the assessment and plan with Primary Team/Nurse

## 2021-08-23 NOTE — PROGRESS NOTE ADULT - SUBJECTIVE AND OBJECTIVE BOX
Podiatry Interval HPI: Patient was seen resting comfortably in bed. Patient is AAOx3. S/p R fifth partial ray amputation. Patient denies pain to RLE. Pt denies any overnight events. Denies N/V/F/C/SOB. No other pedal complaints.     Podiatry HPI: Podiatry consulted regarding 75 yo male patient who presents to ED with a chief complaint of right foot plantar ulcer. Patient states that he noticed the ulcer about 3 weeks ago. Denies any trauma or injury. He recalls stepping on a dog treat and isn't sure if that caused an opening on the bottom of his right foot. Patient states that he has been diagnosed with diabetes for a long time. Patient reports that the ulcer became painful and can hardly ambulate due to pain. Pt rates the pain 10/10 on the VAS. Denies other pedal complaints. Denies constitutional symptoms of N/V/C/F/Sob.     HPI:  Patient is a 76yoM, ambulates and performs ADL independently, w/ PMH of CAD (s/p CABG >10yrs ago), DM, HTN, and HLD, presents with right foot pain x2 weeks. He reports 10/10, progressive, intermittent, sharp, non-radiating, right foot pain. He states stepping on a dog treat approximately 3 weeks ago, which he notice a development of wound at the time. Right foot pain developed a week later. It was mild initially, but progressive has gotten worsen. Patient has been non-compliant with medications for the past 2 months, because he has been feeling great. He denies fever, chills, n/v, dizziness, chest pain, sob, abdominal pain, urinary or bowel movement changes.  (14 Aug 2021 13:11)    Medications ampicillin/sulbactam  IVPB 3 Gram(s) IV Intermittent every 8 hours  atorvastatin 40 milliGRAM(s) Oral at bedtime  cyanocobalamin 1000 MICROGram(s) Oral daily  dextrose 5%. 1000 milliLiter(s) IV Continuous <Continuous>  dextrose 5%. 1000 milliLiter(s) IV Continuous <Continuous>  ergocalciferol 54449 Unit(s) Oral <User Schedule>  ferrous    sulfate 325 milliGRAM(s) Oral daily  glucagon  Injectable 1 milliGRAM(s) IntraMuscular once  insulin lispro (ADMELOG) corrective regimen sliding scale   SubCutaneous Before meals and at bedtime  lactated ringers. 1000 milliLiter(s) IV Continuous <Continuous>  metoprolol succinate  milliGRAM(s) Oral daily  NIFEdipine XL 60 milliGRAM(s) Oral daily  oxycodone    5 mG/acetaminophen 325 mG 1 Tablet(s) Oral every 8 hours    FHFamily history of acute myocardial infarction (Father)    Family history of diabetes mellitus (Mother, Sibling)    Family history of hypertension (Mother, Sibling)    Family history of hyperlipidemia (Mother, Sibling)    ,   PMHHTN (hypertension)    HLD (hyperlipidemia)    DM (diabetes mellitus)    CAD (coronary artery disease)    Glaucoma, angle-closure       PSHNo significant past surgical history    S/P CABG (coronary artery bypass graft)    History of thyroid surgery        Labs                          11.6   6.13  )-----------( 227      ( 23 Aug 2021 06:15 )             34.6      08-23    140  |  107  |  16  ----------------------------<  131<H>  3.7   |  26  |  1.22    Ca    8.8      23 Aug 2021 06:15  Phos  2.9     08-23  Mg     2.4     08-23    TPro  7.0  /  Alb  2.8<L>  /  TBili  0.5  /  DBili  x   /  AST  26  /  ALT  21  /  AlkPhos  111  08-23     Vital Signs Last 24 Hrs  T(C): 36.7 (23 Aug 2021 05:55), Max: 36.7 (22 Aug 2021 13:29)  T(F): 98.1 (23 Aug 2021 05:55), Max: 98.1 (22 Aug 2021 13:29)  HR: 84 (23 Aug 2021 05:55) (70 - 84)  BP: 159/60 (23 Aug 2021 05:55) (154/54 - 168/67)  BP(mean): 78 (22 Aug 2021 13:29) (78 - 78)  RR: 18 (23 Aug 2021 05:55) (18 - 18)  SpO2: 99% (23 Aug 2021 05:55) (98% - 100%)  Sedimentation Rate, Erythrocyte: 91 mm/Hr (08-22-21 @ 15:41)  Sedimentation Rate, Erythrocyte: 44 mm/Hr (08-15-21 @ 06:18)         C-Reactive Protein, Serum: 85 mg/L (08-22-21 @ 21:30)  C-Reactive Protein, Serum: 67 mg/L (08-15-21 @ 11:55)   WBC Count: 6.13 K/uL (08-23-21 @ 06:15)      PHYSICAL EXAM  GEN: SHER ROBERT is a pleasant well-nourished, well developed 76y Male in no acute distress, alert awake, and oriented to person, place and time.   LE Focused:    Vasc: DP/PT 2/4 on the left, 1/4 on the right, CFT brisk to all digits, TG warm to warm on the LLE, localized edema and erythema noted to R surgical site consistent with post op course.   Derm: Incision site located to R 5th digit. Sutures well adhered. Erythema noted along the suture incision site. Distal portion of the suture site look ischemic.  No wound dehiscence noted to surgical site. No drainage noted. No clinical signs of infection present.    Neuro: Protective sensation and epicritic sensation grossly diminished b/l  MSK: Pain localized to the lateral aspect of the right forefoot, able to move extremities in all compartments       Imaging:   Xray  EXAM:  XR FOOT COMP MIN 3 VIEWS RT                          PROCEDURE DATE:  08/14/2021      INTERPRETATION:  Diabetic foot ulcer cellulitis.    3 views right foot.    IMPRESSION: No fracture dislocation or focal bone destruction. No unusual periosteal reaction. Joint spaces preserved. Calcaneal enthesopathy. Extensive calcification/ossification in the region of the plantar fascia . Small vessel calcification. Diffuse soft tissue swelling may reflect cellulitis. No soft tissue gas.      MRI  EXAM:  MR FOOT RT                          PROCEDURE DATE:  08/16/2021      INTERPRETATION:  EXAMINATION: MRI of the right forefoot without contrast    CLINICAL INFORMATION: Right foot ulcer. Evaluate for osteomyelitis.    TECHNIQUE: Multiplanar, multisequential MR imaging was performed.    FINDINGS: The exam is significantly limited by motion artifact. There is a cutaneous wound along the plantar aspect of the fifth metatarsal head with adjacent cellulitic change. There is high T2 and low T1 marrow signal within the plantar aspect of the fifth metatarsal head, consistent with osteomyelitis. There is diffuse fatty atrophy and high T2 signal of musculature, consistent with denervation. There is dorsal subcutaneous soft tissue edema.    IMPRESSION: Limited exam, as above. Cutaneous wound along the plantar aspect of the fifth digit with adjacent osteomyelitis of the fifth metatarsal head.      SIMON  EXAM:  US PHYSIOL LWR EXT 3+ LEV BI                            PROCEDURE DATE:  08/16/2021          INTERPRETATION:  Clinical indication: Diabetes with right foot ulcer    COMPARISON: None    FINDINGS: There are biphasic waveforms bilaterally, dampened at the level of the metatarsals. No abnormal segmental pressure gradient.    Right SIMON = 0.82  Left SIMON = 0.85    IMPRESSION: ABIs compatible with mild peripheral vascular disease.        Microbiology  Culture Results:   Moderate Streptococcus agalactiae (Group B) isolated   Group B streptococci are susceptible to ampicillin,   penicillin and cefazolin, but may be resistant to   erythromycin and clindamycin.   Recommendations for intrapartum prophylaxis for Group B   streptococci are penicillin or ampicillin.   Normal skin filiberto isolated (08.14.21 @ 21:08)

## 2021-08-23 NOTE — PROGRESS NOTE ADULT - PROBLEM SELECTOR PLAN 3
- h/o HTN, non-compliant w/ medications; previously on metoprolol 200mg qd, [id number] qd, and isosorbide 30mg qd      c/w nifedipine 60 mg qd and metoprolol at 100 mg daily  Nephro recommending ACEI/ARB once Cr improves - h/o CAD (s/p CABG >10yr ago), non-compliant w/ medications  - EKG showed NSTEMI, but no changes from jan2021 EKG  - trop 0.22>0.22  - c/w ASA, statin, metoprolol, held d/t procedure podiatry     -TTE with G1dd, moderate to severe AS  - Cardio, Dr. Wilkins consulted SABIHA, EF 55-60%  -recommending SABIHA and AV w/u once acute issues resolve. - h/o CAD (s/p CABG >10yr ago), non-compliant w/ medications  - EKG showed NSTEMI, but no changes from jan2021 EKG  - trop 0.22>0.22  - c/w ASA, statin, metoprolol    -TTE with G1dd, moderate to severe AS  - Cardio, Dr. Wilkins consulted SABIHA, EF 55-60%  -recommending SABIHA and AV w/u once acute issues resolve.

## 2021-08-23 NOTE — PROGRESS NOTE ADULT - SUBJECTIVE AND OBJECTIVE BOX
Patient is seen and examined at the bed side, is afebrile. The intra-op culture remains negative to date.       REVIEW OF SYSTEMS: All other review systems are negative      ALLERGIES: No Known Allergies      Vital Signs Last 24 Hrs  T(C): 36.7 (23 Aug 2021 13:11), Max: 36.7 (23 Aug 2021 05:55)  T(F): 98 (23 Aug 2021 13:11), Max: 98.1 (23 Aug 2021 05:55)  HR: 68 (23 Aug 2021 13:11) (68 - 84)  BP: 158/60 (23 Aug 2021 13:11) (158/60 - 159/60)  BP(mean): --  RR: 18 (23 Aug 2021 13:11) (18 - 18)  SpO2: 98% (23 Aug 2021 13:11) (98% - 99%)        PHYSICAL EXAM:  GENERAL: Not in distress   CHEST/LUNG:  Not using accessory muscles   HEART: s1 and s2 present  ABDOMEN:  Nontender and  Nondistended  EXTREMITIES: Right foot bandage in placed  CNS: Awake and Alert        LABS:                        11.6   6.13  )-----------( 227      ( 23 Aug 2021 06:15 )             34.6                           11.9   7.07  )-----------( 235      ( 22 Aug 2021 07:15 )             35.2                08-23    140  |  107  |  16  ----------------------------<  131<H>  3.7   |  26  |  1.22    Ca    8.8      23 Aug 2021 06:15  Phos  2.9     08-23  Mg     2.4     08-23    TPro  7.0  /  Alb  2.8<L>  /  TBili  0.5  /  DBili  x   /  AST  26  /  ALT  21  /  AlkPhos  111  08-23      08-15    137  |  104  |  25<H>  ----------------------------<  245<H>  4.0   |  24  |  1.62<H>    Ca    8.6      15 Aug 2021 06:18  Phos  3.2     08-15  Mg     2.3     08-15    TPro  7.1  /  Alb  3.4<L>  /  TBili  0.6  /  DBili  x   /  AST  11  /  ALT  16  /  AlkPhos  145<H>  08-15      CAPILLARY BLOOD GLUCOSE  POCT Blood Glucose.: 305 mg/dL (14 Aug 2021 16:20)  POCT Blood Glucose.: 281 mg/dL (14 Aug 2021 07:29)      MEDICATIONS  (STANDING):    ampicillin/sulbactam  IVPB 3 Gram(s) IV Intermittent every 8 hours  aspirin  chewable 81 milliGRAM(s) Oral daily  atorvastatin 40 milliGRAM(s) Oral at bedtime  cyanocobalamin 1000 MICROGram(s) Oral daily  dextrose 5%. 1000 milliLiter(s) (50 mL/Hr) IV Continuous <Continuous>  dextrose 5%. 1000 milliLiter(s) (100 mL/Hr) IV Continuous <Continuous>  ergocalciferol 99051 Unit(s) Oral <User Schedule>  ferrous    sulfate 325 milliGRAM(s) Oral daily  glucagon  Injectable 1 milliGRAM(s) IntraMuscular once  insulin lispro (ADMELOG) corrective regimen sliding scale   SubCutaneous Before meals and at bedtime  lactated ringers. 1000 milliLiter(s) (75 mL/Hr) IV Continuous <Continuous>  metoprolol succinate  milliGRAM(s) Oral daily  NIFEdipine XL 60 milliGRAM(s) Oral daily  oxycodone    5 mG/acetaminophen 325 mG 1 Tablet(s) Oral every 8 hours      RADIOLOGY & ADDITIONAL TESTS:    8/16/21: US Renal (08.16.21 @ 17:02) Normal renal ultrasound.    8/16/21: MR Foot No Cont, Right (08.16.21 @ 16:52) The exam is significantly limited by motion artifact. There is a cutaneous wound along the plantar aspect of the fifth metatarsal head with adjacent cellulitic change. There is high T2 and low T1 marrow signal within the plantar aspect of the fifth metatarsal head, consistent with osteomyelitis. There is diffuse fatty atrophy and high T2 signal of musculature, consistent with denervation. There is dorsal subcutaneous soft tissue edema.      < from: Xray Foot AP + Lateral + Oblique, Right (08.14.21 @ 09:51) >  IMPRESSION: No fracture dislocation or focal bone destruction. No unusual periosteal reaction. Joint spaces preserved. Calcaneal enthesopathy. Extensive calcification/ossification in the region of the plantar fascia . Small vessel calcification. Diffuse soft tissue swelling may reflect cellulitis. No soft tissue gas.        MICROBIOLOGY DATA:    Culture - Tissue with Gram Stain (08.20.21 @ 03:59)   Gram Stain: No polymorphonuclear leukocytes seen per low power field   No organisms seen per oil power field   Specimen Source: Bone R 5th metatarsal bone clean ma   Culture Results: No growth to date.        Culture - Blood (08.14.21 @ 21:09)   Specimen Source: .Blood Blood-Venous   Culture Results: No growth to date.     Culture - Blood (08.14.21 @ 21:09)   Specimen Source: .Blood Blood-Venous   Culture Results: No growth to date.     COVID-19 David Domain Antibody (08.15.21 @ 11:30)   COVID-19 David Domain Antibody Result: >250.00    Culture - Surgical Swab (08.14.21 @ 21:08)   Specimen Source: .Surgical Swab Right foot plantar wound   Culture Results:   Moderate Streptococcus agalactiae (Group B) isolated   Group B streptococci are susceptible to ampicillin,   penicillin and cefazolin, but may be resistant to   erythromycin and clindamycin.   Recommendations for intrapartum prophylaxis for Group B   streptococci are penicillin or ampicillin.   Normal skin filiberto isolated     COVID-19 PCR . (08.14.21 @ 09:41)   COVID-19 PCR: NotDetec               Patient is seen and examined at the bed side, is afebrile. The pathology still in process.       REVIEW OF SYSTEMS: All other review systems are negative      ALLERGIES: No Known Allergies      Vital Signs Last 24 Hrs  T(C): 36.7 (23 Aug 2021 13:11), Max: 36.7 (23 Aug 2021 05:55)  T(F): 98 (23 Aug 2021 13:11), Max: 98.1 (23 Aug 2021 05:55)  HR: 68 (23 Aug 2021 13:11) (68 - 84)  BP: 158/60 (23 Aug 2021 13:11) (158/60 - 159/60)  BP(mean): --  RR: 18 (23 Aug 2021 13:11) (18 - 18)  SpO2: 98% (23 Aug 2021 13:11) (98% - 99%)        PHYSICAL EXAM:  GENERAL: Not in distress   CHEST/LUNG:  Not using accessory muscles   HEART: s1 and s2 present  ABDOMEN:  Nontender and  Nondistended  EXTREMITIES: Right foot bandage in placed  CNS: Awake and Alert        LABS:                        11.6   6.13  )-----------( 227      ( 23 Aug 2021 06:15 )             34.6                           11.9   7.07  )-----------( 235      ( 22 Aug 2021 07:15 )             35.2                08-23    140  |  107  |  16  ----------------------------<  131<H>  3.7   |  26  |  1.22    Ca    8.8      23 Aug 2021 06:15  Phos  2.9     08-23  Mg     2.4     08-23    TPro  7.0  /  Alb  2.8<L>  /  TBili  0.5  /  DBili  x   /  AST  26  /  ALT  21  /  AlkPhos  111  08-23      08-15    137  |  104  |  25<H>  ----------------------------<  245<H>  4.0   |  24  |  1.62<H>    Ca    8.6      15 Aug 2021 06:18  Phos  3.2     08-15  Mg     2.3     08-15    TPro  7.1  /  Alb  3.4<L>  /  TBili  0.6  /  DBili  x   /  AST  11  /  ALT  16  /  AlkPhos  145<H>  08-15      CAPILLARY BLOOD GLUCOSE  POCT Blood Glucose.: 305 mg/dL (14 Aug 2021 16:20)  POCT Blood Glucose.: 281 mg/dL (14 Aug 2021 07:29)      MEDICATIONS  (STANDING):    ampicillin/sulbactam  IVPB 3 Gram(s) IV Intermittent every 8 hours  aspirin  chewable 81 milliGRAM(s) Oral daily  atorvastatin 40 milliGRAM(s) Oral at bedtime  cyanocobalamin 1000 MICROGram(s) Oral daily  dextrose 5%. 1000 milliLiter(s) (50 mL/Hr) IV Continuous <Continuous>  dextrose 5%. 1000 milliLiter(s) (100 mL/Hr) IV Continuous <Continuous>  ergocalciferol 41870 Unit(s) Oral <User Schedule>  ferrous    sulfate 325 milliGRAM(s) Oral daily  glucagon  Injectable 1 milliGRAM(s) IntraMuscular once  insulin lispro (ADMELOG) corrective regimen sliding scale   SubCutaneous Before meals and at bedtime  lactated ringers. 1000 milliLiter(s) (75 mL/Hr) IV Continuous <Continuous>  metoprolol succinate  milliGRAM(s) Oral daily  NIFEdipine XL 60 milliGRAM(s) Oral daily  oxycodone    5 mG/acetaminophen 325 mG 1 Tablet(s) Oral every 8 hours      RADIOLOGY & ADDITIONAL TESTS:    8/16/21: US Renal (08.16.21 @ 17:02) Normal renal ultrasound.    8/16/21: MR Foot No Cont, Right (08.16.21 @ 16:52) The exam is significantly limited by motion artifact. There is a cutaneous wound along the plantar aspect of the fifth metatarsal head with adjacent cellulitic change. There is high T2 and low T1 marrow signal within the plantar aspect of the fifth metatarsal head, consistent with osteomyelitis. There is diffuse fatty atrophy and high T2 signal of musculature, consistent with denervation. There is dorsal subcutaneous soft tissue edema.      < from: Xray Foot AP + Lateral + Oblique, Right (08.14.21 @ 09:51) >  IMPRESSION: No fracture dislocation or focal bone destruction. No unusual periosteal reaction. Joint spaces preserved. Calcaneal enthesopathy. Extensive calcification/ossification in the region of the plantar fascia . Small vessel calcification. Diffuse soft tissue swelling may reflect cellulitis. No soft tissue gas.        MICROBIOLOGY DATA:    Culture - Tissue with Gram Stain (08.20.21 @ 03:59)   Gram Stain: No polymorphonuclear leukocytes seen per low power field   No organisms seen per oil power field   Specimen Source: Bone R 5th metatarsal bone clean ma   Culture Results: No growth to date.        Culture - Blood (08.14.21 @ 21:09)   Specimen Source: .Blood Blood-Venous   Culture Results: No growth to date.     Culture - Blood (08.14.21 @ 21:09)   Specimen Source: .Blood Blood-Venous   Culture Results: No growth to date.     COVID-19 David Domain Antibody (08.15.21 @ 11:30)   COVID-19 David Domain Antibody Result: >250.00    Culture - Surgical Swab (08.14.21 @ 21:08)   Specimen Source: .Surgical Swab Right foot plantar wound   Culture Results:   Moderate Streptococcus agalactiae (Group B) isolated   Group B streptococci are susceptible to ampicillin,   penicillin and cefazolin, but may be resistant to   erythromycin and clindamycin.   Recommendations for intrapartum prophylaxis for Group B   streptococci are penicillin or ampicillin.   Normal skin filiberto isolated     COVID-19 PCR . (08.14.21 @ 09:41)   COVID-19 PCR: NotDetec

## 2021-08-23 NOTE — PROGRESS NOTE ADULT - PROBLEM SELECTOR PLAN 7
- heparin for dvt ppx,held d/t procedure - h/o glaucoma, not on any medications  - monitor for symptom  - recommend outpt f/u

## 2021-08-23 NOTE — PROGRESS NOTE ADULT - ASSESSMENT
Patient is a 76y old  Male who ambulates and performs ADL independently, w/ PMH of CAD (s/p CABG >10yrs ago), DM, HTN, and HLD, now presents to the ER for evaluation of right foot pain x2 weeks.  He states stepping on a dog treat approximately 3 weeks ago, which he notice a development of wound at the time. Right foot pain developed a week later. It was mild initially, but progressive has gotten worsen. Patient has been non-compliant with medications for the past 2 months, because he has been feeling great. ON admission, he found to have No fever but tachycardia. He has started on Unasyn and Vancomycin, and the ID consult requested to assist with further evaluation and antibiotic management.    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 Follow up  pathology result  for clear margin  2. Continue Unasyn since wound culture grew Streptococcus agalactiae  3.. Monitor kidney function  4. Wound care as per Podiatry     Attending Attestation:    Spent more than 35 minutes on total encounter, more than 50 % of the visit was spent counseling and/or coordinating care by the Attending physician.   Patient is a 76y old  Male who ambulates and performs ADL independently, w/ PMH of CAD (s/p CABG >10yrs ago), DM, HTN, and HLD, now presents to the ER for evaluation of right foot pain x2 weeks.  He states stepping on a dog treat approximately 3 weeks ago, which he notice a development of wound at the time. Right foot pain developed a week later. It was mild initially, but progressive has gotten worsen. Patient has been non-compliant with medications for the past 2 months, because he has been feeling great. ON admission, he found to have No fever but tachycardia. He has started on Unasyn and Vancomycin, and the ID consult requested to assist with further evaluation and antibiotic management.    # Right DFU - wound Cx grew Streptococcus agalactiae (Group B)- Osteomyelitis of plantar aspect of the fifth metatarsal head - on MRI 8/16/21  # s/p Right 5th met ray amputation 8/19/21    would recommend:    1.Please Call Pathology  Dept.  Follow for pathology result    2. Continue Unasyn until pathology resulted   3.. Monitor kidney function  4. Wound care as per Podiatry     Attending Attestation:    Spent more than 35 minutes on total encounter, more than 50 % of the visit was spent counseling and/or coordinating care by the Attending physician.

## 2021-08-23 NOTE — PROGRESS NOTE ADULT - ASSESSMENT
Assessment  s/p R 5th partial ray amputation (8/19)   Osteomyelitis R 5th digit   Diabetic ulcer R sub 5th met  DM Type II      Plan  Patient evaluated and chart created  Surgical pathology pending   Dressing removed and suture site evaluated - distal portion looks ischemic   Applied Nitrobid 2% ointment and applied near the pedal pulses   Applied adaptic and DSD to the right foot   Pt to be nonWB to R foot  Rx orthoheel wedge shoe  WCO upon discharge:  Wear orthoheel wedge shoe to R foot  Keep dressing clean, dry, and intact  Change dressing to R foot every other day using adaptic, 4x4s, ABD, rebecca, ACE   Follow up outpatient to [address] [place] [address] [address] Telephone number: [phone]  Continue antibiotics per ID recommendation   Stable from podiatry   Discussed with attending Dr. Vazquez and seen bedside with Dr. Choe    Assessment  s/p R 5th partial ray amputation (8/19)   Osteomyelitis R 5th digit   Diabetic ulcer R sub 5th met  DM Type II      Plan  Patient evaluated and chart created  Surgical pathology -NGTD  Dressing removed and suture site evaluated - distal portion looks ischemic   Applied Nitrobid 2% ointment and applied near the pedal pulses   Applied adaptic and DSD to the right foot   Pt to be nonWB to R foot  Rx orthoheel wedge shoe  WCO upon discharge:  Wear orthoheel wedge shoe to R foot  Keep dressing clean, dry, and intact  Change dressing to R foot every other day using adaptic, 4x4s, ABD, rebecca, ACE   Follow up outpatient to [address] [place] [address] [address] Telephone number: [phone]  Continue antibiotics per ID recommendation   Stable from podiatry   Discussed with attending Dr. Vazquez and seen bedside with Dr. Choe

## 2021-08-23 NOTE — PROGRESS NOTE ADULT - ATTENDING COMMENTS
Seen at bedside. Discussed MRI results.  Plan for OR tomorrow.  Continue to medically optimize.  Discussed planned procedure
PHYSICAL EXAMINATION:  GENERAL APPEARANCE: NO DISTRESS  HEENT:  NO PALLOR, NO  JVD,  NO   NODES, NECK SUPPLE  CVS: S1 +, S2 +,   RS: AEEB,  OCCASIONAL  RALES +,   NO RONCHI  ABD: SOFT, NT, NO, BS +  EXT: NO PE  SKIN: WARM,   RIGHT FOOT IN BANDAGE  SKELETAL:  ROM ACCEPTABLE  CNS:  AAO X 3,   DEFICITS    RADIOLOGY :      EXAM:  XR FOOT COMP MIN 3 VIEWS RT                            PROCEDURE DATE:  08/14/2021          INTERPRETATION:  Diabetic foot ulcer cellulitis.    3 views right foot.    IMPRESSION: No fracture dislocation or focal bone destruction. No unusual periosteal reaction. Joint spaces preserved. Calcaneal enthesopathy. Extensive calcification/ossification in the region of the plantar fascia . Small vessel calcification. Diffuse soft tissue swelling may reflect cellulitis. No soft tissue gas.      ASSESSMENT :     Type 2 diabetes mellitus with foot ulcer    HTN (hypertension)    HLD (hyperlipidemia)    DM (diabetes mellitus)    CAD (coronary artery disease)    Glaucoma, angle-closure    No significant past surgical history    S/P CABG (coronary artery bypass graft)    History of thyroid surgery        PLAN:  HPI:  Patient is a 76yoM, ambulates and performs ADL independently, w/ PMH of CAD (s/p CABG >10yrs ago), DM, HTN, and HLD, presents with right foot pain x2 weeks. He reports 10/10, progressive, intermittent, sharp, non-radiating, right foot pain. He states stepping on a dog treat approximately 3 weeks ago, which he notice a development of wound at the time. Right foot pain developed a week later. It was mild initially, but progressive has gotten worsen. Patient has been non-compliant with medications for the past 2 months, because he has been feeling great. He denies fever, chills, n/v, dizziness, chest pain, sob, abdominal pain, urinary or bowel movement changes.  (14 Aug 2021 13:11)    # DIABETIC FOOT ULCER, CELLULITIS, OSTEOMYELITIS RIGHT 5th METATARSAL - NOTED XRAY AND MRI, PLAN FOR RIGHT 5TH METATARSAL HEAD RESECTION  - ON UNASYN  - NOTED SIMON, VASCULAR SX CONSULT - DR. ORTEGA  - WOUND CX - GBS, F/U BCX  - ID CONSULT, PODIATRY CONSULT    # MEDICAL CLEARANCE FOR SURGERY - RCRI - 2 - 10.1 % 30 DAY RISK OF DEATH, MI OR CARDIAC ARREST  - CARDIOLOGY CONSULT IN PROGRESS    # OREN  ON SUSPECTED CKD - S/P IVF, MONITOR CR, AVOID NEPHROTOXIC AGENTS  - PLACED ON IVF, NOTED UA   - NEPHROLOGY CONSULT IN A.M. - DR. SOUZA    # DM -  HBA1C - 11, STARTED LANTUS, SSI + FS    # CAD S/P CABG - PLACED ON ASA, STATIN, BB, REVIEWED ECHOCARDIOGRAM  - ECHO - SEVERE AORTIC STENOSIS, SEVERE CONCENTRIC LVH, G1DD, MILD [place]  - CARDIOLOGY CONSULT    # HTN - ON METOPROLOL, WILL ADD NICARDIPINE; D/C AMLODIPINE    # SEVERE, ASYMPTOMATIC, STENOSIS - ONCE ACUTE ILLNESS RESOLVES, WILL LIKELY NEED ISCHEMIC WORKUP, SABIHA TO EVALUATE FURTHER. D/W PATIENT, DAUGHTER AND CARDIOLOGY TEAM.    # VITAMIN D DEFICIENCY - STARTED ON CHOLECALCIFEROL    # HLD - STATIN, REVIEWED LIPID PANEL    # CASE DISCUSSED AT LENGTH WITH PATIENT AND DAUGHTER, BIRDIE ROBERT - [phone]    # GI AND DVT PPX
HPI:  Patient is a 76yoM, ambulates and performs ADL independently, w/ PMH of CAD (s/p CABG >10yrs ago), DM, HTN, and HLD, presents with right foot pain x2 weeks. He reports 10/10, progressive, intermittent, sharp, non-radiating, right foot pain. He states stepping on a dog treat approximately 3 weeks ago, which he notice a development of wound at the time. Right foot pain developed a week later. It was mild initially, but progressive has gotten worsen. Patient has been non-compliant with medications for the past 2 months, because he has been feeling great. He denies fever, chills, n/v, dizziness, chest pain, sob, abdominal pain, urinary or bowel movement changes.  (14 Aug 2021 13:11)    # DIABETIC FOOT ULCER, CELLULITIS, OSTEOMYELITIS RIGHT 5th METATARSAL - NOTED XRAY AND MRI, PLAN FOR RIGHT 5TH METATARSAL HEAD RESECTION  - ON UNASYN  - NOTED SIMON, VASCULAR SX CONSULT - DR. ORTEGA  - WOUND CX - GBS, BCX - NGTD  - ID CONSULT, PODIATRY CONSULT    # MEDICAL CLEARANCE FOR SURGERY - RCRI - 2 - 10.1 % 30 DAY RISK OF DEATH, MI OR CARDIAC ARREST  - CARDIOLOGY CONSULT IN PROGRESS    # OREN  ON SUSPECTED CKD - S/P IVF, MONITOR CR, AVOID NEPHROTOXIC AGENTS  - PLACED ON IVF, NOTED UA   - NEPHROLOGY CONSULT IN A.M. - DR. SOUZA    # DM -  HBA1C - 11, STARTED LANTUS, SSI + FS    # CAD S/P CABG - PLACED ON ASA, STATIN, BB, REVIEWED ECHOCARDIOGRAM  - ECHO - SEVERE AORTIC STENOSIS, SEVERE CONCENTRIC LVH, G1DD, MILD [place]  - CARDIOLOGY CONSULT    # HTN - ON METOPROLOL, NICARDIPINE  - D/Jamarcus AMLODIPINE    # SEVERE, ASYMPTOMATIC, STENOSIS - ONCE ACUTE ILLNESS RESOLVES, WILL LIKELY NEED ISCHEMIC WORKUP, SABIHA TO EVALUATE FURTHER. D/W PATIENT, DAUGHTER AND CARDIOLOGY TEAM.    # VITAMIN D DEFICIENCY - STARTED ON CHOLECALCIFEROL    # HLD - STATIN, REVIEWED LIPID PANEL    # CASE DISCUSSED AT LENGTH WITH PATIENT AND DAUGHTER, BIRDIE ROBERT - [phone]    # GI AND DVT PPX
discussed management plan with house staff
HPI:  Patient is a 76yoM, ambulates and performs ADL independently, w/ PMH of CAD (s/p CABG >10yrs ago), DM, HTN, and HLD, presents with right foot pain x2 weeks. He reports 10/10, progressive, intermittent, sharp, non-radiating, right foot pain. He states stepping on a dog treat approximately 3 weeks ago, which he notice a development of wound at the time. Right foot pain developed a week later. It was mild initially, but progressive has gotten worsen. Patient has been non-compliant with medications for the past 2 months, because he has been feeling great. He denies fever, chills, n/v, dizziness, chest pain, sob, abdominal pain, urinary or bowel movement changes.  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 ORTEGA  - WOUND CX - GBS, BCX - NGTD  - F/U BONE PATHOLOGY  - ID CONSULT, PODIATRY CONSULT    # MEDICAL CLEARANCE FOR SURGERY - RCRI - 2 - 10.1 % 30 DAY RISK OF DEATH, MI OR CARDIAC ARREST  - CARDIOLOGY CONSULT IN PROGRESS    # OREN  ON SUSPECTED CKD - S/P IVF, MONITOR CR, AVOID NEPHROTOXIC AGENTS  - PLACED ON IVF, NOTED UA   - NEPHROLOGY CONSULT IN A.M. - DR. SOUZA    # DM -  HBA1C - 11, STARTED LANTUS, SSI + FS    # CAD S/P CABG - PLACED ON ASA, STATIN, BB, REVIEWED ECHOCARDIOGRAM  - ECHO - SEVERE AORTIC STENOSIS, SEVERE CONCENTRIC LVH, G1DD, MILD [place]  - CARDIOLOGY CONSULT    # HTN - ON METOPROLOL, NICARDIPINE  - D/Jamarcus AMLODIPINE    # SEVERE, ASYMPTOMATIC, STENOSIS - ONCE ACUTE ILLNESS RESOLVES, WILL LIKELY NEED ISCHEMIC WORKUP, SABIHA TO EVALUATE FURTHER. D/W PATIENT, DAUGHTER AND CARDIOLOGY TEAM.    # VITAMIN D DEFICIENCY - STARTED ON CHOLECALCIFEROL    # HLD - STATIN, REVIEWED LIPID PANEL    # CASE DISCUSSED AT LENGTH WITH PATIENT AND DAUGHTER, BIRDIE ROBERT - [phone]    # GI AND DVT PPX
HPI:  Patient is a 76yoM, ambulates and performs ADL independently, w/ PMH of CAD (s/p CABG >10yrs ago), DM, HTN, and HLD, presents with right foot pain x2 weeks. He reports 10/10, progressive, intermittent, sharp, non-radiating, right foot pain. He states stepping on a dog treat approximately 3 weeks ago, which he notice a development of wound at the time. Right foot pain developed a week later. It was mild initially, but progressive has gotten worsen. Patient has been non-compliant with medications for the past 2 months, because he has been feeling great. He denies fever, chills, n/v, dizziness, chest pain, sob, abdominal pain, urinary or bowel movement changes.  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 ORTEGA  - WOUND CX - GBS, BCX - NGTD  - F/U BONE PATHOLOGY  - ID CONSULT, PODIATRY CONSULT    # MEDICAL CLEARANCE FOR SURGERY - RCRI - 2 - 10.1 % 30 DAY RISK OF DEATH, MI OR CARDIAC ARREST  - CARDIOLOGY CONSULT IN PROGRESS    # OREN  ON SUSPECTED CKD - S/P IVF, MONITOR CR, AVOID NEPHROTOXIC AGENTS  - PLACED ON IVF, NOTED UA   - NEPHROLOGY CONSULT IN A.M. - DR. SOUZA    # DM -  HBA1C - 11, STARTED LANTUS, SSI + FS    # CAD S/P CABG - PLACED ON ASA, STATIN, BB, REVIEWED ECHOCARDIOGRAM  - ECHO - SEVERE AORTIC STENOSIS, SEVERE CONCENTRIC LVH, G1DD, MILD [place]  - CARDIOLOGY CONSULT    # HTN - ON METOPROLOL, NICARDIPINE  - D/Jamarcus AMLODIPINE    # SEVERE, ASYMPTOMATIC, STENOSIS - ONCE ACUTE ILLNESS RESOLVES, WILL LIKELY NEED ISCHEMIC WORKUP, SABIHA TO EVALUATE FURTHER. D/W PATIENT, DAUGHTER AND CARDIOLOGY TEAM.    # VITAMIN D DEFICIENCY - STARTED ON CHOLECALCIFEROL    # HLD - STATIN, REVIEWED LIPID PANEL    # CASE DISCUSSED AT LENGTH WITH PATIENT AND DAUGHTER, BIRDIE ROBERT - [phone] [UPDATED ON 8/22]  # PATIENT AND DAUGHTER REFUSED STEVAN RECOMMENDATION [BY PT] AT THIS TIME - THEY PREFER PATIENT RETURN HOME W/ HOME PT    # GI AND DVT PPX .

## 2021-08-23 NOTE — PROGRESS NOTE ADULT - PROBLEM SELECTOR PLAN 5
- h/o HLD, non-compliant w/ medications  - c/w statin  -Lipid panel WNL Started on cholecalciferol Started on ergocalciferol

## 2021-08-23 NOTE — PROGRESS NOTE ADULT - PROBLEM SELECTOR PLAN 2
- h/o CAD (s/p CABG >10yr ago), non-compliant w/ medications  - EKG showed NSTEMI, but no changes from jan2021 EKG  - trop 0.22>0.22  - c/w ASA, statin, metoprolol, held d/t procedure podiatry     -TTE with G1dd, moderate to severe AS  - Cardio, Dr. Wilkins consulted SABIHA, EF 55-60%  -recommending SABIHA and AV w/u once acute issues resolve. SCr 1.22 today, improved. 1.59 on admission, no baseline available per chart  Cr stable  Placed on IVF  Dr. Herrera consulted

## 2021-08-23 NOTE — PROGRESS NOTE ADULT - SUBJECTIVE AND OBJECTIVE BOX
C A R D I O L O G Y  **********************************     DATE OF SERVICE: 08-22-21    Patient denies chest pain or shortness of breath.   Review of symptoms otherwise negative.         DATE OF SERVICE: 08-23-21    Patient denies chest pain or shortness of breath.   Review of symptoms otherwise negative.    ampicillin/sulbactam  IVPB 3 Gram(s) IV Intermittent every 8 hours  atorvastatin 40 milliGRAM(s) Oral at bedtime  cyanocobalamin 1000 MICROGram(s) Oral daily  dextrose 5%. 1000 milliLiter(s) IV Continuous <Continuous>  dextrose 5%. 1000 milliLiter(s) IV Continuous <Continuous>  ergocalciferol 22598 Unit(s) Oral <User Schedule>  ferrous    sulfate 325 milliGRAM(s) Oral daily  glucagon  Injectable 1 milliGRAM(s) IntraMuscular once  insulin lispro (ADMELOG) corrective regimen sliding scale   SubCutaneous Before meals and at bedtime  lactated ringers. 1000 milliLiter(s) IV Continuous <Continuous>  metoprolol succinate  milliGRAM(s) Oral daily  NIFEdipine XL 60 milliGRAM(s) Oral daily  oxycodone    5 mG/acetaminophen 325 mG 1 Tablet(s) Oral every 8 hours                            11.6   6.13  )-----------( 227      ( 23 Aug 2021 06:15 )             34.6       Hemoglobin: 11.6 g/dL (08-23 @ 06:15)  Hemoglobin: 11.9 g/dL (08-22 @ 07:15)  Hemoglobin: 11.5 g/dL (08-21 @ 06:24)  Hemoglobin: 11.2 g/dL (08-20 @ 06:08)  Hemoglobin: 11.6 g/dL (08-19 @ 07:02)      08-23    140  |  107  |  16  ----------------------------<  131<H>  3.7   |  26  |  1.22    Ca    8.8      23 Aug 2021 06:15  Phos  2.9     08-23  Mg     2.4     08-23    TPro  7.0  /  Alb  2.8<L>  /  TBili  0.5  /  DBili  x   /  AST  26  /  ALT  21  /  AlkPhos  111  08-23    Creatinine Trend: 1.22<--, 1.41<--, 1.37<--, 1.30<--, 1.34<--, 1.42<--    COAGS:           T(C): 36.7 (08-23-21 @ 05:55), Max: 36.7 (08-22-21 @ 13:29)  HR: 78 (08-23-21 @ 11:59) (70 - 84)  BP: 159/60 (08-23-21 @ 05:55) (154/54 - 168/67)  RR: 18 (08-23-21 @ 05:55) (18 - 18)  SpO2: 98% (08-23-21 @ 11:59) (98% - 100%)  Wt(kg): --    I&O's Summary    22 Aug 2021 07:01  -  23 Aug 2021 07:00  --------------------------------------------------------  IN: 240 mL / OUT: 800 mL / NET: -560 mL      HEENT:  (-)icterus (-)pallor  CV: N S1 S2 1/6 TRINIDAD (+)2 Pulses B/l  Resp:  Clear to ausculatation B/L, normal effort  GI: (+) BS Soft, NT, ND  Lymph:  (-)Edema, (-)obvious lymphadenopathy  Skin: Warm to touch, Normal turgor  Psych: Appropriate mood and affect          ASSESSMENT/PLAN: 	76y  Male ambulates and performs ADL independently, w/ PMH of CAD (s/p CABG >10yrs ago), DM, HTN, and HLD, presents with right foot pain x2 weeks. Cardiology consulted for management of cad s/p 5th partial ray amputation.    - progressing well  - Echo noted, CINTIA of 1.0 cm moderate to severe AS, will need SABIHA and AV w/u once acute issues resolve.  He denies CP, SOB or LOC  - no clinical CHF  - Abx per ID  - cont ASA, statin BB    Zak Wilkins MD, [facility]  BEEPER [phone]

## 2021-08-24 VITALS
OXYGEN SATURATION: 96 % | DIASTOLIC BLOOD PRESSURE: 47 MMHG | SYSTOLIC BLOOD PRESSURE: 125 MMHG | HEART RATE: 68 BPM | TEMPERATURE: 98 F | RESPIRATION RATE: 18 BRPM

## 2021-08-24 LAB
ALBUMIN SERPL ELPH-MCNC: 2.9 G/DL — LOW (ref 3.5–5)
ALP SERPL-CCNC: 124 U/L — HIGH (ref 40–120)
ALT FLD-CCNC: 23 U/L DA — SIGNIFICANT CHANGE UP (ref 10–60)
ANION GAP SERPL CALC-SCNC: 7 MMOL/L — SIGNIFICANT CHANGE UP (ref 5–17)
AST SERPL-CCNC: 30 U/L — SIGNIFICANT CHANGE UP (ref 10–40)
BILIRUB SERPL-MCNC: 0.7 MG/DL — SIGNIFICANT CHANGE UP (ref 0.2–1.2)
BUN SERPL-MCNC: 17 MG/DL — SIGNIFICANT CHANGE UP (ref 7–18)
CALCIUM SERPL-MCNC: 8.8 MG/DL — SIGNIFICANT CHANGE UP (ref 8.4–10.5)
CHLORIDE SERPL-SCNC: 106 MMOL/L — SIGNIFICANT CHANGE UP (ref 96–108)
CO2 SERPL-SCNC: 26 MMOL/L — SIGNIFICANT CHANGE UP (ref 22–31)
CREAT SERPL-MCNC: 1.18 MG/DL — SIGNIFICANT CHANGE UP (ref 0.5–1.3)
GLUCOSE BLDC GLUCOMTR-MCNC: 103 MG/DL — HIGH (ref 70–99)
GLUCOSE BLDC GLUCOMTR-MCNC: 103 MG/DL — HIGH (ref 70–99)
GLUCOSE SERPL-MCNC: 98 MG/DL — SIGNIFICANT CHANGE UP (ref 70–99)
HCT VFR BLD CALC: 37.6 % — LOW (ref 39–50)
HGB BLD-MCNC: 12.5 G/DL — LOW (ref 13–17)
MAGNESIUM SERPL-MCNC: 2.3 MG/DL — SIGNIFICANT CHANGE UP (ref 1.6–2.6)
MCHC RBC-ENTMCNC: 28.9 PG — SIGNIFICANT CHANGE UP (ref 27–34)
MCHC RBC-ENTMCNC: 33.2 GM/DL — SIGNIFICANT CHANGE UP (ref 32–36)
MCV RBC AUTO: 87 FL — SIGNIFICANT CHANGE UP (ref 80–100)
NRBC # BLD: 0 /100 WBCS — SIGNIFICANT CHANGE UP (ref 0–0)
PHOSPHATE SERPL-MCNC: 3 MG/DL — SIGNIFICANT CHANGE UP (ref 2.5–4.5)
PLATELET # BLD AUTO: 270 K/UL — SIGNIFICANT CHANGE UP (ref 150–400)
POTASSIUM SERPL-MCNC: 3.8 MMOL/L — SIGNIFICANT CHANGE UP (ref 3.5–5.3)
POTASSIUM SERPL-SCNC: 3.8 MMOL/L — SIGNIFICANT CHANGE UP (ref 3.5–5.3)
PROT SERPL-MCNC: 7.9 G/DL — SIGNIFICANT CHANGE UP (ref 6–8.3)
RBC # BLD: 4.32 M/UL — SIGNIFICANT CHANGE UP (ref 4.2–5.8)
RBC # FLD: 12.3 % — SIGNIFICANT CHANGE UP (ref 10.3–14.5)
SODIUM SERPL-SCNC: 139 MMOL/L — SIGNIFICANT CHANGE UP (ref 135–145)
SURGICAL PATHOLOGY STUDY: SIGNIFICANT CHANGE UP
WBC # BLD: 6.32 K/UL — SIGNIFICANT CHANGE UP (ref 3.8–10.5)
WBC # FLD AUTO: 6.32 K/UL — SIGNIFICANT CHANGE UP (ref 3.8–10.5)

## 2021-08-24 PROCEDURE — 88311 DECALCIFY TISSUE: CPT

## 2021-08-24 PROCEDURE — 82962 GLUCOSE BLOOD TEST: CPT

## 2021-08-24 PROCEDURE — 93923 UPR/LXTR ART STDY 3+ LVLS: CPT

## 2021-08-24 PROCEDURE — 87077 CULTURE AEROBIC IDENTIFY: CPT

## 2021-08-24 PROCEDURE — 82550 ASSAY OF CK (CPK): CPT

## 2021-08-24 PROCEDURE — 84443 ASSAY THYROID STIM HORMONE: CPT

## 2021-08-24 PROCEDURE — 82310 ASSAY OF CALCIUM: CPT

## 2021-08-24 PROCEDURE — 73718 MRI LOWER EXTREMITY W/O DYE: CPT

## 2021-08-24 PROCEDURE — 83935 ASSAY OF URINE OSMOLALITY: CPT

## 2021-08-24 PROCEDURE — 85652 RBC SED RATE AUTOMATED: CPT

## 2021-08-24 PROCEDURE — 81001 URINALYSIS AUTO W/SCOPE: CPT

## 2021-08-24 PROCEDURE — 82728 ASSAY OF FERRITIN: CPT

## 2021-08-24 PROCEDURE — 97162 PT EVAL MOD COMPLEX 30 MIN: CPT

## 2021-08-24 PROCEDURE — 85025 COMPLETE CBC W/AUTO DIFF WBC: CPT

## 2021-08-24 PROCEDURE — 80048 BASIC METABOLIC PNL TOTAL CA: CPT

## 2021-08-24 PROCEDURE — 73630 X-RAY EXAM OF FOOT: CPT

## 2021-08-24 PROCEDURE — 80053 COMPREHEN METABOLIC PANEL: CPT

## 2021-08-24 PROCEDURE — 93005 ELECTROCARDIOGRAM TRACING: CPT

## 2021-08-24 PROCEDURE — 86901 BLOOD TYPING SEROLOGIC RH(D): CPT

## 2021-08-24 PROCEDURE — 99285 EMERGENCY DEPT VISIT HI MDM: CPT

## 2021-08-24 PROCEDURE — 96375 TX/PRO/DX INJ NEW DRUG ADDON: CPT

## 2021-08-24 PROCEDURE — 83036 HEMOGLOBIN GLYCOSYLATED A1C: CPT

## 2021-08-24 PROCEDURE — 85610 PROTHROMBIN TIME: CPT

## 2021-08-24 PROCEDURE — 83735 ASSAY OF MAGNESIUM: CPT

## 2021-08-24 PROCEDURE — 88304 TISSUE EXAM BY PATHOLOGIST: CPT

## 2021-08-24 PROCEDURE — 76775 US EXAM ABDO BACK WALL LIM: CPT

## 2021-08-24 PROCEDURE — 80061 LIPID PANEL: CPT

## 2021-08-24 PROCEDURE — 85027 COMPLETE CBC AUTOMATED: CPT

## 2021-08-24 PROCEDURE — 99053 MED SERV 10PM-8AM 24 HR FAC: CPT

## 2021-08-24 PROCEDURE — 84540 ASSAY OF URINE/UREA-N: CPT

## 2021-08-24 PROCEDURE — 83540 ASSAY OF IRON: CPT

## 2021-08-24 PROCEDURE — 84156 ASSAY OF PROTEIN URINE: CPT

## 2021-08-24 PROCEDURE — 83550 IRON BINDING TEST: CPT

## 2021-08-24 PROCEDURE — 86850 RBC ANTIBODY SCREEN: CPT

## 2021-08-24 PROCEDURE — 87040 BLOOD CULTURE FOR BACTERIA: CPT

## 2021-08-24 PROCEDURE — 86140 C-REACTIVE PROTEIN: CPT

## 2021-08-24 PROCEDURE — 82570 ASSAY OF URINE CREATININE: CPT

## 2021-08-24 PROCEDURE — 82746 ASSAY OF FOLIC ACID SERUM: CPT

## 2021-08-24 PROCEDURE — 87635 SARS-COV-2 COVID-19 AMP PRB: CPT

## 2021-08-24 PROCEDURE — 86900 BLOOD TYPING SEROLOGIC ABO: CPT

## 2021-08-24 PROCEDURE — 84133 ASSAY OF URINE POTASSIUM: CPT

## 2021-08-24 PROCEDURE — 96374 THER/PROPH/DIAG INJ IV PUSH: CPT

## 2021-08-24 PROCEDURE — 82607 VITAMIN B-12: CPT

## 2021-08-24 PROCEDURE — 87075 CULTR BACTERIA EXCEPT BLOOD: CPT

## 2021-08-24 PROCEDURE — 87070 CULTURE OTHR SPECIMN AEROBIC: CPT

## 2021-08-24 PROCEDURE — 84484 ASSAY OF TROPONIN QUANT: CPT

## 2021-08-24 PROCEDURE — 36415 COLL VENOUS BLD VENIPUNCTURE: CPT

## 2021-08-24 PROCEDURE — 83970 ASSAY OF PARATHORMONE: CPT

## 2021-08-24 PROCEDURE — 86769 SARS-COV-2 COVID-19 ANTIBODY: CPT

## 2021-08-24 PROCEDURE — 82306 VITAMIN D 25 HYDROXY: CPT

## 2021-08-24 PROCEDURE — 84100 ASSAY OF PHOSPHORUS: CPT

## 2021-08-24 PROCEDURE — 84300 ASSAY OF URINE SODIUM: CPT

## 2021-08-24 PROCEDURE — 93306 TTE W/DOPPLER COMPLETE: CPT

## 2021-08-24 RX ORDER — HYDROMORPHONE HYDROCHLORIDE 2 MG/ML
2 INJECTION INTRAMUSCULAR; INTRAVENOUS; SUBCUTANEOUS ONCE
Refills: 0 | Status: DISCONTINUED | OUTPATIENT
Start: 2021-08-24 | End: 2021-08-24

## 2021-08-24 RX ORDER — INSULIN DETEMIR 100/ML (3)
38 INSULIN PEN (ML) SUBCUTANEOUS
Qty: 0 | Refills: 0 | DISCHARGE

## 2021-08-24 RX ORDER — HYDRALAZINE HCL 50 MG
5 TABLET ORAL ONCE
Refills: 0 | Status: COMPLETED | OUTPATIENT
Start: 2021-08-24 | End: 2021-08-24

## 2021-08-24 RX ORDER — METOPROLOL TARTRATE 50 MG
1 TABLET ORAL
Qty: 30 | Refills: 0
Start: 2021-08-24 | End: 2021-09-22

## 2021-08-24 RX ORDER — METOPROLOL TARTRATE 50 MG
1 TABLET ORAL
Qty: 0 | Refills: 0 | DISCHARGE

## 2021-08-24 RX ORDER — ASPIRIN/CALCIUM CARB/MAGNESIUM 324 MG
1 TABLET ORAL
Qty: 30 | Refills: 0
Start: 2021-08-24 | End: 2021-09-22

## 2021-08-24 RX ORDER — LISINOPRIL 2.5 MG/1
1 TABLET ORAL
Qty: 30 | Refills: 0
Start: 2021-08-24 | End: 2021-09-22

## 2021-08-24 RX ORDER — INSULIN GLARGINE 100 [IU]/ML
10 INJECTION, SOLUTION SUBCUTANEOUS AT BEDTIME
Refills: 0 | Status: DISCONTINUED | OUTPATIENT
Start: 2021-08-24 | End: 2021-08-24

## 2021-08-24 RX ORDER — LISINOPRIL 2.5 MG/1
10 TABLET ORAL DAILY
Refills: 0 | Status: DISCONTINUED | OUTPATIENT
Start: 2021-08-24 | End: 2021-08-24

## 2021-08-24 RX ORDER — INSULIN GLARGINE 100 [IU]/ML
10 INJECTION, SOLUTION SUBCUTANEOUS
Qty: 0 | Refills: 0 | DISCHARGE
Start: 2021-08-24

## 2021-08-24 RX ORDER — ENOXAPARIN SODIUM 100 MG/ML
10 INJECTION SUBCUTANEOUS
Qty: 1 | Refills: 0
Start: 2021-08-24 | End: 2021-09-22

## 2021-08-24 RX ADMIN — PREGABALIN 1000 MICROGRAM(S): 225 CAPSULE ORAL at 11:30

## 2021-08-24 RX ADMIN — Medication 81 MILLIGRAM(S): at 11:30

## 2021-08-24 RX ADMIN — HYDROMORPHONE HYDROCHLORIDE 2 MILLIGRAM(S): 2 INJECTION INTRAMUSCULAR; INTRAVENOUS; SUBCUTANEOUS at 12:40

## 2021-08-24 RX ADMIN — Medication 5 MILLIGRAM(S): at 07:00

## 2021-08-24 RX ADMIN — OXYCODONE AND ACETAMINOPHEN 1 TABLET(S): 5; 325 TABLET ORAL at 06:25

## 2021-08-24 RX ADMIN — AMPICILLIN SODIUM AND SULBACTAM SODIUM 200 GRAM(S): 250; 125 INJECTION, POWDER, FOR SUSPENSION INTRAMUSCULAR; INTRAVENOUS at 05:25

## 2021-08-24 RX ADMIN — Medication 60 MILLIGRAM(S): at 05:25

## 2021-08-24 RX ADMIN — ERGOCALCIFEROL 50000 UNIT(S): 1.25 CAPSULE ORAL at 11:30

## 2021-08-24 RX ADMIN — Medication 325 MILLIGRAM(S): at 11:30

## 2021-08-24 RX ADMIN — LISINOPRIL 10 MILLIGRAM(S): 2.5 TABLET ORAL at 09:05

## 2021-08-24 RX ADMIN — OXYCODONE AND ACETAMINOPHEN 1 TABLET(S): 5; 325 TABLET ORAL at 05:25

## 2021-08-24 RX ADMIN — Medication 100 MILLIGRAM(S): at 05:25

## 2021-08-24 NOTE — PROGRESS NOTE ADULT - PROVIDER SPECIALTY LIST ADULT
Cardiology
Infectious Disease
Infectious Disease
Nephrology
Podiatry
Infectious Disease
Internal Medicine
Nephrology
Podiatry
Cardiology
Infectious Disease
Infectious Disease
Internal Medicine
Nephrology
Nephrology
Podiatry
Infectious Disease
Infectious Disease
Podiatry
Podiatry
Internal Medicine

## 2021-08-24 NOTE — PROGRESS NOTE ADULT - SUBJECTIVE AND OBJECTIVE BOX
Podiatry Interval HPI: Patient was seen resting comfortably in bed. Patient is AAOx3. S/p R fifth partial ray amputation. Patient denies pain to RLE. Pt denies any overnight events. Denies N/V/F/C/SOB. No other pedal complaints.     Podiatry HPI: Podiatry consulted regarding 75 yo male patient who presents to ED with a chief complaint of right foot plantar ulcer. Patient states that he noticed the ulcer about 3 weeks ago. Denies any trauma or injury. He recalls stepping on a dog treat and isn't sure if that caused an opening on the bottom of his right foot. Patient states that he has been diagnosed with diabetes for a long time. Patient reports that the ulcer became painful and can hardly ambulate due to pain. Pt rates the pain 10/10 on the VAS. Denies other pedal complaints. Denies constitutional symptoms of N/V/C/F/Sob.     HPI:  Patient is a 76yoM, ambulates and performs ADL independently, w/ PMH of CAD (s/p CABG >10yrs ago), DM, HTN, and HLD, presents with right foot pain x2 weeks. He reports 10/10, progressive, intermittent, sharp, non-radiating, right foot pain. He states stepping on a dog treat approximately 3 weeks ago, which he notice a development of wound at the time. Right foot pain developed a week later. It was mild initially, but progressive has gotten worsen. Patient has been non-compliant with medications for the past 2 months, because he has been feeling great. He denies fever, chills, n/v, dizziness, chest pain, sob, abdominal pain, urinary or bowel movement changes.  (14 Aug 2021 13:11)      Medications ampicillin/sulbactam  IVPB 3 Gram(s) IV Intermittent every 8 hours  aspirin  chewable 81 milliGRAM(s) Oral daily  atorvastatin 40 milliGRAM(s) Oral at bedtime  cyanocobalamin 1000 MICROGram(s) Oral daily  dextrose 5%. 1000 milliLiter(s) IV Continuous <Continuous>  dextrose 5%. 1000 milliLiter(s) IV Continuous <Continuous>  ergocalciferol 48011 Unit(s) Oral <User Schedule>  ferrous    sulfate 325 milliGRAM(s) Oral daily  glucagon  Injectable 1 milliGRAM(s) IntraMuscular once  insulin lispro (ADMELOG) corrective regimen sliding scale   SubCutaneous Before meals and at bedtime  lactated ringers. 1000 milliLiter(s) IV Continuous <Continuous>  lisinopril 10 milliGRAM(s) Oral daily  metoprolol succinate  milliGRAM(s) Oral daily  NIFEdipine XL 60 milliGRAM(s) Oral daily  oxycodone    5 mG/acetaminophen 325 mG 1 Tablet(s) Oral every 8 hours    FHFamily history of acute myocardial infarction (Father)    Family history of diabetes mellitus (Mother, Sibling)    Family history of hypertension (Mother, Sibling)    Family history of hyperlipidemia (Mother, Sibling)    ,   PMHHTN (hypertension)    HLD (hyperlipidemia)    DM (diabetes mellitus)    CAD (coronary artery disease)    Glaucoma, angle-closure       PSHNo significant past surgical history    S/P CABG (coronary artery bypass graft)    History of thyroid surgery        Labs                          12.5   6.32  )-----------( 270      ( 24 Aug 2021 06:21 )             37.6      08-24    139  |  106  |  17  ----------------------------<  98  3.8   |  26  |  1.18    Ca    8.8      24 Aug 2021 06:21  Phos  3.0     08-24  Mg     2.3     08-24    TPro  7.9  /  Alb  2.9<L>  /  TBili  0.7  /  DBili  x   /  AST  30  /  ALT  23  /  AlkPhos  124<H>  08-24     Vital Signs Last 24 Hrs  T(C): 36.7 (24 Aug 2021 04:52), Max: 36.9 (23 Aug 2021 22:05)  T(F): 98.1 (24 Aug 2021 04:52), Max: 98.5 (23 Aug 2021 22:05)  HR: 73 (24 Aug 2021 04:52) (68 - 78)  BP: 191/77 (24 Aug 2021 07:20) (158/60 - 191/77)  BP(mean): --  RR: 18 (24 Aug 2021 04:52) (17 - 18)  SpO2: 100% (24 Aug 2021 04:52) (98% - 100%)  Sedimentation Rate, Erythrocyte: 91 mm/Hr (08-22-21 @ 15:41)  Sedimentation Rate, Erythrocyte: 44 mm/Hr (08-15-21 @ 06:18)         C-Reactive Protein, Serum: 85 mg/L (08-22-21 @ 21:30)  C-Reactive Protein, Serum: 67 mg/L (08-15-21 @ 11:55)   WBC Count: 6.32 K/uL (08-24-21 @ 06:21)      PHYSICAL EXAM  GEN: SHER ROBERT is a pleasant well-nourished, well developed 76y Male in no acute distress, alert awake, and oriented to person, place and time.   LE Focused:    Vasc: DP/PT 2/4 on the left, 1/4 on the right, CFT brisk to all digits, TG warm to warm on the LLE, localized edema and erythema noted to R surgical site consistent with post op course.   Derm: Incision site located to R 5th digit. Sutures well adhered. Erythema noted along the suture incision site. Distal portion of the suture site look ischemic.  No wound dehiscence noted to surgical site. No drainage noted. No clinical signs of infection present.    Neuro: Protective sensation and epicritic sensation grossly diminished b/l  MSK: Pain localized to the lateral aspect of the right forefoot, able to move extremities in all compartments       Imaging:   Xray  EXAM:  XR FOOT COMP MIN 3 VIEWS RT                          PROCEDURE DATE:  08/14/2021      INTERPRETATION:  Diabetic foot ulcer cellulitis.    3 views right foot.    IMPRESSION: No fracture dislocation or focal bone destruction. No unusual periosteal reaction. Joint spaces preserved. Calcaneal enthesopathy. Extensive calcification/ossification in the region of the plantar fascia . Small vessel calcification. Diffuse soft tissue swelling may reflect cellulitis. No soft tissue gas.      MRI  EXAM:  MR FOOT RT                          PROCEDURE DATE:  08/16/2021      INTERPRETATION:  EXAMINATION: MRI of the right forefoot without contrast    CLINICAL INFORMATION: Right foot ulcer. Evaluate for osteomyelitis.    TECHNIQUE: Multiplanar, multisequential MR imaging was performed.    FINDINGS: The exam is significantly limited by motion artifact. There is a cutaneous wound along the plantar aspect of the fifth metatarsal head with adjacent cellulitic change. There is high T2 and low T1 marrow signal within the plantar aspect of the fifth metatarsal head, consistent with osteomyelitis. There is diffuse fatty atrophy and high T2 signal of musculature, consistent with denervation. There is dorsal subcutaneous soft tissue edema.    IMPRESSION: Limited exam, as above. Cutaneous wound along the plantar aspect of the fifth digit with adjacent osteomyelitis of the fifth metatarsal head.      SIMON  EXAM:  US PHYSIOL LWR EXT 3+ LEV BI                            PROCEDURE DATE:  08/16/2021          INTERPRETATION:  Clinical indication: Diabetes with right foot ulcer    COMPARISON: None    FINDINGS: There are biphasic waveforms bilaterally, dampened at the level of the metatarsals. No abnormal segmental pressure gradient.    Right SIMON = 0.82  Left SIMON = 0.85    IMPRESSION: ABIs compatible with mild peripheral vascular disease.        Microbiology  Culture Results:   Moderate Streptococcus agalactiae (Group B) isolated   Group B streptococci are susceptible to ampicillin,   penicillin and cefazolin, but may be resistant to   erythromycin and clindamycin.   Recommendations for intrapartum prophylaxis for Group B   streptococci are penicillin or ampicillin.   Normal skin filiberto isolated (08.14.21 @ 21:08)

## 2021-08-24 NOTE — CHART NOTE - NSCHARTNOTEFT_GEN_A_CORE
Patient BP remains elevated   190/70  HR 81  Currently only on Metroprolol 50mg   Will restart home med Amlodipine 10mg
RN paged for patient complaining of severe foot pain 10/10, aching, burning, shooting pain. Pt was previously on 1mg dilaudid and then 0.5mg dilaudid q3h and was discontinued today. On examination, pt is in pain and pain is aggravated by moving the extremity. Will order one time dose of dilaudid 0.5mg ivp now. Primary team to follow, consider pain management consult.
Paged by RN. Pt /72, patient due for morning dose of metoprolol and nifedipine, asymptomatic. Given morning dose and will reassess    After morning dose: Pt still HTN (182/77), given 5mg IV hydralazine    Primary team to f/u

## 2021-08-24 NOTE — PROGRESS NOTE ADULT - REASON FOR ADMISSION
diabetic ulcer

## 2021-08-24 NOTE — PROGRESS NOTE ADULT - ASSESSMENT
Assessment  s/p R 5th partial ray amputation (8/19)   Osteomyelitis R 5th digit   Diabetic ulcer R sub 5th met  DM Type II      Plan  Patient evaluated and chart created  Surgical pathology -NGTD  Dressing removed and suture site evaluated - distal portion looks ischemic   Applied Nitrobid 2% ointment and applied near the pedal pulses   Applied adaptic and DSD to the right foot   Pt to be nonWB to R foot  Rx orthoheel wedge shoe  WCO upon discharge:  Wear orthoheel wedge shoe to R foot  Keep dressing clean, dry, and intact  Change dressing to R foot every other day using adaptic, 4x4s, ABD, rebecca, ACE   Follow up outpatient to [address] [place] [address] [address]. Telephone number: [phone]  Continue antibiotics per ID recommendation   Stable from podiatry   Discussed with attending Dr. Vazquez

## 2021-08-24 NOTE — PROGRESS NOTE ADULT - PROBLEM SELECTOR PLAN 2
SCr 1.18 today, improved. 1.59 on admission, no baseline available per chart  Cr stable  Placed on IVF  Dr. Herrera consulted

## 2021-08-24 NOTE — PROGRESS NOTE ADULT - SUBJECTIVE AND OBJECTIVE BOX
Patient is seen and examined at the bed side, is afebrile. The pathology shows clear Margin.       REVIEW OF SYSTEMS: All other review systems are negative      ALLERGIES: No Known Allergies      Vital Signs Last 24 Hrs  T(C): 36.8 (24 Aug 2021 13:59), Max: 36.9 (23 Aug 2021 22:05)  T(F): 98.2 (24 Aug 2021 13:59), Max: 98.5 (23 Aug 2021 22:05)  HR: 68 (24 Aug 2021 13:59) (67 - 73)  BP: 125/47 (  24 Aug 2021 13:59) (125/47 - 191/77)  BP(mean): --  RR: 18 (24 Aug 2021 13:59) (17 - 18)  SpO2: 96% (24 Aug 2021 13:59) (96% - 100%)      PHYSICAL EXAM:  GENERAL: Not in distress   CHEST/LUNG:  Not using accessory muscles   HEART: s1 and s2 present  ABDOMEN:  Nontender and  Nondistended  EXTREMITIES: Right foot bandage in placed  CNS: Awake and Alert        LABS:                        12.5   6.32  )-----------( 270      ( 24 Aug 2021 06:21 )             37.6                           11.6   6.13  )-----------( 227      ( 23 Aug 2021 06:15 )             34.6         08-24    139  |  106  |  17  ----------------------------<  98  3.8   |  26  |  1.18    Ca    8.8      24 Aug 2021 06:21  Phos  3.0     08-24  Mg     2.3     08-24    TPro  7.9  /  Alb  2.9<L>  /  TBili  0.7  /  DBili  x   /  AST  30  /  ALT  23  /  AlkPhos  124<H>  08-24               08-23    140  |  107  |  16  ----------------------------<  131<H>  3.7   |  26  |  1.22    Ca    8.8      23 Aug 2021 06:15  Phos  2.9     08-23  Mg     2.4     08-23    TPro  7.0  /  Alb  2.8<L>  /  TBili  0.5  /  DBili  x   /  AST  26  /  ALT  21  /  AlkPhos  111  08-23      08-15    137  |  104  |  25<H>  ----------------------------<  245<H>  4.0   |  24  |  1.62<H>    Ca    8.6      15 Aug 2021 06:18  Phos  3.2     08-15  Mg     2.3     08-15    TPro  7.1  /  Alb  3.4<L>  /  TBili  0.6  /  DBili  x   /  AST  11  /  ALT  16  /  AlkPhos  145<H>  08-15      CAPILLARY BLOOD GLUCOSE  POCT Blood Glucose.: 305 mg/dL (14 Aug 2021 16:20)  POCT Blood Glucose.: 281 mg/dL (14 Aug 2021 07:29)      MEDICATIONS  (STANDING):    amoxicillin  875 milliGRAM(s)/clavulanate 1 Tablet(s) Oral two times a day  aspirin  chewable 81 milliGRAM(s) Oral daily  atorvastatin 40 milliGRAM(s) Oral at bedtime  cyanocobalamin 1000 MICROGram(s) Oral daily  dextrose 5%. 1000 milliLiter(s) (50 mL/Hr) IV Continuous <Continuous>  dextrose 5%. 1000 milliLiter(s) (100 mL/Hr) IV Continuous <Continuous>  ergocalciferol 87926 Unit(s) Oral <User Schedule>  ferrous    sulfate 325 milliGRAM(s) Oral daily  glucagon  Injectable 1 milliGRAM(s) IntraMuscular once  insulin glargine Injectable (LANTUS) 10 Unit(s) SubCutaneous at bedtime  insulin lispro (ADMELOG) corrective regimen sliding scale   SubCutaneous Before meals and at bedtime  lactated ringers. 1000 milliLiter(s) (75 mL/Hr) IV Continuous <Continuous>  metoprolol succinate  milliGRAM(s) Oral daily  NIFEdipine XL 60 milliGRAM(s) Oral daily  oxycodone    5 mG/acetaminophen 325 mG 1 Tablet(s) Oral every 8 hours        RADIOLOGY & ADDITIONAL TESTS:      Surgical Pathology Report (08.19.21 @ 18:18)   Surgical Pathology Report:   ACCESSION No: [id number]   SHER ROBERT 2   Surgical Final Report   Final Diagnosis   1. Right fifth metatarsal bones; resection :   - Ulcerated skin with dermal abscess and tissue necrosis   extending into periosteal fibroconnective tissue.   - Acute osteomyelitis.   2. Clean margin, right fifth metatarsal bone; resection :   - Cancellous bone without acute osteomyelitis, consistent with   "clean   margin".   Verified by: Billie Paris MD   8/16/21: US Renal (08.16.21 @ 17:02) Normal renal ultrasound.    8/16/21: MR Foot No Cont, Right (08.16.21 @ 16:52) The exam is significantly limited by motion artifact. There is a cutaneous wound along the plantar aspect of the fifth metatarsal head with adjacent cellulitic change. There is high T2 and low T1 marrow signal within the plantar aspect of the fifth metatarsal head, consistent with osteomyelitis. There is diffuse fatty atrophy and high T2 signal of musculature, consistent with denervation. There is dorsal subcutaneous soft tissue edema.      < from: Xray Foot AP + Lateral + Oblique, Right (08.14.21 @ 09:51) >  IMPRESSION: No fracture dislocation or focal bone destruction. No unusual periosteal reaction. Joint spaces preserved. Calcaneal enthesopathy. Extensive calcification/ossification in the region of the plantar fascia . Small vessel calcification. Diffuse soft tissue swelling may reflect cellulitis. No soft tissue gas.        MICROBIOLOGY DATA:    Culture - Tissue with Gram Stain (08.20.21 @ 03:59)   Gram Stain: No polymorphonuclear leukocytes seen per low power field   No organisms seen per oil power field   Specimen Source: Bone R 5th metatarsal bone clean ma   Culture Results: No growth to date.        Culture - Blood (08.14.21 @ 21:09)   Specimen Source: .Blood Blood-Venous   Culture Results: No growth to date.     Culture - Blood (08.14.21 @ 21:09)   Specimen Source: .Blood Blood-Venous   Culture Results: No growth to date.     COVID-19 David Domain Antibody (08.15.21 @ 11:30)   COVID-19 David Domain Antibody Result: >250.00    Culture - Surgical Swab (08.14.21 @ 21:08)   Specimen Source: .Surgical Swab Right foot plantar wound   Culture Results:   Moderate Streptococcus agalactiae (Group B) isolated   Group B streptococci are susceptible to ampicillin,   penicillin and cefazolin, but may be resistant to   erythromycin and clindamycin.   Recommendations for intrapartum prophylaxis for Group B   streptococci are penicillin or ampicillin.   Normal skin filiberto isolated     COVID-19 PCR . (08.14.21 @ 09:41)   COVID-19 PCR: NotDetec               Patient is afebrile. The pathology shows clear Margin.       REVIEW OF SYSTEMS: All other review systems are negative      ALLERGIES: No Known Allergies      Vital Signs Last 24 Hrs  T(C): 36.8 (24 Aug 2021 13:59), Max: 36.9 (23 Aug 2021 22:05)  T(F): 98.2 (24 Aug 2021 13:59), Max: 98.5 (23 Aug 2021 22:05)  HR: 68 (24 Aug 2021 13:59) (67 - 73)  BP: 125/47 (  24 Aug 2021 13:59) (125/47 - 191/77)  BP(mean): --  RR: 18 (24 Aug 2021 13:59) (17 - 18)  SpO2: 96% (24 Aug 2021 13:59) (96% - 100%)      PHYSICAL EXAM:  GENERAL: Not in distress   CHEST/LUNG:  Not using accessory muscles   HEART: s1 and s2 present  ABDOMEN:  Nontender and  Nondistended  EXTREMITIES: Right foot bandage in placed  CNS: Awake and Alert        LABS:                        12.5   6.32  )-----------( 270      ( 24 Aug 2021 06:21 )             37.6                           11.6   6.13  )-----------( 227      ( 23 Aug 2021 06:15 )             34.6         08-24    139  |  106  |  17  ----------------------------<  98  3.8   |  26  |  1.18    Ca    8.8      24 Aug 2021 06:21  Phos  3.0     08-24  Mg     2.3     08-24    TPro  7.9  /  Alb  2.9<L>  /  TBili  0.7  /  DBili  x   /  AST  30  /  ALT  23  /  AlkPhos  124<H>  08-24               08-23    140  |  107  |  16  ----------------------------<  131<H>  3.7   |  26  |  1.22    Ca    8.8      23 Aug 2021 06:15  Phos  2.9     08-23  Mg     2.4     08-23    TPro  7.0  /  Alb  2.8<L>  /  TBili  0.5  /  DBili  x   /  AST  26  /  ALT  21  /  AlkPhos  111  08-23      08-15    137  |  104  |  25<H>  ----------------------------<  245<H>  4.0   |  24  |  1.62<H>    Ca    8.6      15 Aug 2021 06:18  Phos  3.2     08-15  Mg     2.3     08-15    TPro  7.1  /  Alb  3.4<L>  /  TBili  0.6  /  DBili  x   /  AST  11  /  ALT  16  /  AlkPhos  145<H>  08-15      CAPILLARY BLOOD GLUCOSE  POCT Blood Glucose.: 305 mg/dL (14 Aug 2021 16:20)  POCT Blood Glucose.: 281 mg/dL (14 Aug 2021 07:29)      MEDICATIONS  (STANDING):    amoxicillin  875 milliGRAM(s)/clavulanate 1 Tablet(s) Oral two times a day  aspirin  chewable 81 milliGRAM(s) Oral daily  atorvastatin 40 milliGRAM(s) Oral at bedtime  cyanocobalamin 1000 MICROGram(s) Oral daily  dextrose 5%. 1000 milliLiter(s) (50 mL/Hr) IV Continuous <Continuous>  dextrose 5%. 1000 milliLiter(s) (100 mL/Hr) IV Continuous <Continuous>  ergocalciferol 64177 Unit(s) Oral <User Schedule>  ferrous    sulfate 325 milliGRAM(s) Oral daily  glucagon  Injectable 1 milliGRAM(s) IntraMuscular once  insulin glargine Injectable (LANTUS) 10 Unit(s) SubCutaneous at bedtime  insulin lispro (ADMELOG) corrective regimen sliding scale   SubCutaneous Before meals and at bedtime  lactated ringers. 1000 milliLiter(s) (75 mL/Hr) IV Continuous <Continuous>  metoprolol succinate  milliGRAM(s) Oral daily  NIFEdipine XL 60 milliGRAM(s) Oral daily  oxycodone    5 mG/acetaminophen 325 mG 1 Tablet(s) Oral every 8 hours        RADIOLOGY & ADDITIONAL TESTS:      Surgical Pathology Report (08.19.21 @ 18:18)   Surgical Pathology Report:   ACCESSION No: [id number]   SHER ROBERT 2   Surgical Final Report   Final Diagnosis   1. Right fifth metatarsal bones; resection :   - Ulcerated skin with dermal abscess and tissue necrosis   extending into periosteal fibroconnective tissue.   - Acute osteomyelitis.   2. Clean margin, right fifth metatarsal bone; resection :   - Cancellous bone without acute osteomyelitis, consistent with   "clean   margin".   Verified by: Billie Paris MD   8/16/21: US Renal (08.16.21 @ 17:02) Normal renal ultrasound.    8/16/21: MR Foot No Cont, Right (08.16.21 @ 16:52) The exam is significantly limited by motion artifact. There is a cutaneous wound along the plantar aspect of the fifth metatarsal head with adjacent cellulitic change. There is high T2 and low T1 marrow signal within the plantar aspect of the fifth metatarsal head, consistent with osteomyelitis. There is diffuse fatty atrophy and high T2 signal of musculature, consistent with denervation. There is dorsal subcutaneous soft tissue edema.      < from: Xray Foot AP + Lateral + Oblique, Right (08.14.21 @ 09:51) >  IMPRESSION: No fracture dislocation or focal bone destruction. No unusual periosteal reaction. Joint spaces preserved. Calcaneal enthesopathy. Extensive calcification/ossification in the region of the plantar fascia . Small vessel calcification. Diffuse soft tissue swelling may reflect cellulitis. No soft tissue gas.        MICROBIOLOGY DATA:    Culture - Tissue with Gram Stain (08.20.21 @ 03:59)   Gram Stain: No polymorphonuclear leukocytes seen per low power field   No organisms seen per oil power field   Specimen Source: Bone R 5th metatarsal bone clean ma   Culture Results: No growth to date.        Culture - Blood (08.14.21 @ 21:09)   Specimen Source: .Blood Blood-Venous   Culture Results: No growth to date.     Culture - Blood (08.14.21 @ 21:09)   Specimen Source: .Blood Blood-Venous   Culture Results: No growth to date.     COVID-19 David Domain Antibody (08.15.21 @ 11:30)   COVID-19 David Domain Antibody Result: >250.00    Culture - Surgical Swab (08.14.21 @ 21:08)   Specimen Source: .Surgical Swab Right foot plantar wound   Culture Results:   Moderate Streptococcus agalactiae (Group B) isolated   Group B streptococci are susceptible to ampicillin,   penicillin and cefazolin, but may be resistant to   erythromycin and clindamycin.   Recommendations for intrapartum prophylaxis for Group B   streptococci are penicillin or ampicillin.   Normal skin filiberto isolated     COVID-19 PCR . (08.14.21 @ 09:41)   COVID-19 PCR: NotDetec

## 2021-08-24 NOTE — PROGRESS NOTE ADULT - ASSESSMENT
1. OREN due to pre-renal azotemia due to volume depletion.  -Scr remains stable and unchanged possible baseline and caution with fluids since h/o Severe AS  -urinalysis shows proteinuria; uosm and FeNa is 0.76% and spot protein to creatinine ratio is 1.9gm  - renal sonogram shows no hydronephrosis with Right kidney 12.3cm and Left kidney 11.1cm  -Adjust meds to eGFR and avoid IV Gadolinium contrast,NSAIDs, and phosphate enema.  -Monitor I/O's daily.   -Monitor SMA daily.  2. CKD stage 3 most likely due to diabetic nephropathy and hypertensive nephrosclerosis with proteinuria  -patient has mild component of ckd. presently could be his baseline. continue lisinopril for proteinuria.  -Keep patient euvolemic and renal diet  -Avoid Nephrotoxic Meds/ Agents such as (NSAIDs, IV contrast, Aminoglycosides such as gentamicin, -Gadolinium contrast, Phosphate containing enemas, etc..)  -Adjust Medications according to eGFR  3. HTN: -bp is elevated and lisinopril increased to 10mg daily. on norvasc. continue bp meds  -titrate bp meds to keep sbp >110 and < 130  4. Mineral Bone Disease:  -phos is okay, Vitamin 25 OH is low and on ergo 50k weekly, and PTH intact around 60  5. Anemia due to iron deficiency,  -iron panel noted and low iron sat, on ferrous tab daily,  folate is okay and  vitamin B12 is low and on vitamin b12 tabs.  -F/u CBC daily  -transfuse if HB < 7.0.  6. Rt foot ulcer: on unasyn and Plan as per podiatry; MRI of foot done and 5th toe head resection s/p 8/19.    Discussed with patient in detail regarding the renal plan and care  Discussed the assessment and plan with Primary Team/Nurse

## 2021-08-24 NOTE — PROGRESS NOTE ADULT - SUBJECTIVE AND OBJECTIVE BOX
PGY-1 Progress Note discussed with attending    PAGER #: [[phone]] TILL 5:00 PM  PLEASE CONTACT ON CALL TEAM:  - On Call Team (Please refer to Dashawn) FROM 5:00 PM - 8:30PM  - Nightfloat Team FROM 8:30 -7:30 AM    INTERVAL HPI  -     OVERNIGHT EVENTS:   -     REVIEW OF SYSTEMS:  CONSTITUTIONAL: No fever, weight loss, or fatigue  RESPIRATORY: No cough, wheezing, chills or hemoptysis; No shortness of breath  CARDIOVASCULAR: No chest pain, palpitations, dizziness, or leg swelling  GASTROINTESTINAL: No abdominal pain. No nausea, vomiting, or hematemesis; No diarrhea or constipation. No melena or hematochezia.  GENITOURINARY: No dysuria or hematuria, urinary frequency  NEUROLOGICAL: No headaches, memory loss, loss of strength, numbness, or tremors  SKIN: No itching, burning, rashes, or lesions     MEDICATIONS  (STANDING):  ampicillin/sulbactam  IVPB 3 Gram(s) IV Intermittent every 8 hours  aspirin  chewable 81 milliGRAM(s) Oral daily  atorvastatin 40 milliGRAM(s) Oral at bedtime  cyanocobalamin 1000 MICROGram(s) Oral daily  dextrose 5%. 1000 milliLiter(s) (50 mL/Hr) IV Continuous <Continuous>  dextrose 5%. 1000 milliLiter(s) (100 mL/Hr) IV Continuous <Continuous>  ergocalciferol 72941 Unit(s) Oral <User Schedule>  ferrous    sulfate 325 milliGRAM(s) Oral daily  glucagon  Injectable 1 milliGRAM(s) IntraMuscular once  insulin lispro (ADMELOG) corrective regimen sliding scale   SubCutaneous Before meals and at bedtime  lactated ringers. 1000 milliLiter(s) (75 mL/Hr) IV Continuous <Continuous>  lisinopril 10 milliGRAM(s) Oral daily  metoprolol succinate  milliGRAM(s) Oral daily  NIFEdipine XL 60 milliGRAM(s) Oral daily  oxycodone    5 mG/acetaminophen 325 mG 1 Tablet(s) Oral every 8 hours    MEDICATIONS  (PRN):      Vital Signs Last 24 Hrs  T(C): 36.9 (24 Aug 2021 10:20), Max: 36.9 (23 Aug 2021 22:05)  T(F): 98.4 (24 Aug 2021 10:20), Max: 98.5 (23 Aug 2021 22:05)  HR: 67 (24 Aug 2021 10:20) (67 - 78)  BP: 165/65 (24 Aug 2021 10:20) (158/60 - 191/77)  BP(mean): --  RR: 17 (24 Aug 2021 10:20) (17 - 18)  SpO2: 96% (24 Aug 2021 10:20) (96% - 100%)    PHYSICAL EXAMINATION:  GENERAL: NAD, AAOx  HEAD: AT/NC  EYES: conjunctiva and sclera clear  NECK: supple, No JVD noted, Normal thyroid  CHEST/LUNG: CTABL; no rales, rhonchi, wheezing, or rubs  HEART: regular rate and rhythm; no murmurs, rubs, or gallops  ABDOMEN: soft, nontender, nondistended; Bowel sounds present  EXTREMITIES:  2+ Peripheral Pulses, No clubbing, cyanosis, or edema  SKIN: warm dry                          12.5   6.32  )-----------( 270      ( 24 Aug 2021 06:21 )             37.6     08-24    139  |  106  |  17  ----------------------------<  98  3.8   |  26  |  1.18    Ca    8.8      24 Aug 2021 06:21  Phos  3.0     08-24  Mg     2.3     08-24    TPro  7.9  /  Alb  2.9<L>  /  TBili  0.7  /  DBili  x   /  AST  30  /  ALT  23  /  AlkPhos  124<H>  08-24    LIVER FUNCTIONS - ( 24 Aug 2021 06:21 )  Alb: 2.9 g/dL / Pro: 7.9 g/dL / ALK PHOS: 124 U/L / ALT: 23 U/L DA / AST: 30 U/L / GGT: x                 COVID-19 PCR: NotDetec (18 Aug 2021 18:23)  COVID-19 PCR: NotDetec (14 Aug 2021 09:41)      CAPILLARY BLOOD GLUCOSE      POCT Blood Glucose.: 103 mg/dL (24 Aug 2021 08:30)  POCT Blood Glucose.: 121 mg/dL (23 Aug 2021 22:22)  POCT Blood Glucose.: 140 mg/dL (23 Aug 2021 16:52)  POCT Blood Glucose.: 131 mg/dL (23 Aug 2021 11:27)      RADIOLOGY & ADDITIONAL TESTS:                   PGY-1 Progress Note discussed with attending    PAGER #: [[phone]] TILL 5:00 PM  PLEASE CONTACT ON CALL TEAM:  - On Call Team (Please refer to Dashawn) FROM 5:00 PM - 8:30PM  - Nightfloat Team FROM 8:30 -7:30 AM    INTERVAL HPI  - 875 x2     OVERNIGHT EVENTS:   -     REVIEW OF SYSTEMS:  CONSTITUTIONAL: No fever, weight loss, or fatigue  RESPIRATORY: No cough, wheezing, chills or hemoptysis; No shortness of breath  CARDIOVASCULAR: No chest pain, palpitations, dizziness, or leg swelling  GASTROINTESTINAL: No abdominal pain. No nausea, vomiting, or hematemesis; No diarrhea or constipation. No melena or hematochezia.  GENITOURINARY: No dysuria or hematuria, urinary frequency  NEUROLOGICAL: No headaches, memory loss, loss of strength, numbness, or tremors  SKIN: No itching, burning, rashes, or lesions     MEDICATIONS  (STANDING):  ampicillin/sulbactam  IVPB 3 Gram(s) IV Intermittent every 8 hours  aspirin  chewable 81 milliGRAM(s) Oral daily  atorvastatin 40 milliGRAM(s) Oral at bedtime  cyanocobalamin 1000 MICROGram(s) Oral daily  dextrose 5%. 1000 milliLiter(s) (50 mL/Hr) IV Continuous <Continuous>  dextrose 5%. 1000 milliLiter(s) (100 mL/Hr) IV Continuous <Continuous>  ergocalciferol 72062 Unit(s) Oral <User Schedule>  ferrous    sulfate 325 milliGRAM(s) Oral daily  glucagon  Injectable 1 milliGRAM(s) IntraMuscular once  insulin lispro (ADMELOG) corrective regimen sliding scale   SubCutaneous Before meals and at bedtime  lactated ringers. 1000 milliLiter(s) (75 mL/Hr) IV Continuous <Continuous>  lisinopril 10 milliGRAM(s) Oral daily  metoprolol succinate  milliGRAM(s) Oral daily  NIFEdipine XL 60 milliGRAM(s) Oral daily  oxycodone    5 mG/acetaminophen 325 mG 1 Tablet(s) Oral every 8 hours    MEDICATIONS  (PRN):      Vital Signs Last 24 Hrs  T(C): 36.9 (24 Aug 2021 10:20), Max: 36.9 (23 Aug 2021 22:05)  T(F): 98.4 (24 Aug 2021 10:20), Max: 98.5 (23 Aug 2021 22:05)  HR: 67 (24 Aug 2021 10:20) (67 - 78)  BP: 165/65 (24 Aug 2021 10:20) (158/60 - 191/77)  BP(mean): --  RR: 17 (24 Aug 2021 10:20) (17 - 18)  SpO2: 96% (24 Aug 2021 10:20) (96% - 100%)    PHYSICAL EXAMINATION:  GENERAL: NAD, AAOx  HEAD: AT/NC  EYES: conjunctiva and sclera clear  NECK: supple, No JVD noted, Normal thyroid  CHEST/LUNG: CTABL; no rales, rhonchi, wheezing, or rubs  HEART: regular rate and rhythm; no murmurs, rubs, or gallops  ABDOMEN: soft, nontender, nondistended; Bowel sounds present  EXTREMITIES:  2+ Peripheral Pulses, No clubbing, cyanosis, or edema  SKIN: warm dry                          12.5   6.32  )-----------( 270      ( 24 Aug 2021 06:21 )             37.6     08-24    139  |  106  |  17  ----------------------------<  98  3.8   |  26  |  1.18    Ca    8.8      24 Aug 2021 06:21  Phos  3.0     08-24  Mg     2.3     08-24    TPro  7.9  /  Alb  2.9<L>  /  TBili  0.7  /  DBili  x   /  AST  30  /  ALT  23  /  AlkPhos  124<H>  08-24    LIVER FUNCTIONS - ( 24 Aug 2021 06:21 )  Alb: 2.9 g/dL / Pro: 7.9 g/dL / ALK PHOS: 124 U/L / ALT: 23 U/L DA / AST: 30 U/L / GGT: x                 COVID-19 PCR: NotDetec (18 Aug 2021 18:23)  COVID-19 PCR: NotDetec (14 Aug 2021 09:41)      CAPILLARY BLOOD GLUCOSE      POCT Blood Glucose.: 103 mg/dL (24 Aug 2021 08:30)  POCT Blood Glucose.: 121 mg/dL (23 Aug 2021 22:22)  POCT Blood Glucose.: 140 mg/dL (23 Aug 2021 16:52)  POCT Blood Glucose.: 131 mg/dL (23 Aug 2021 11:27)      RADIOLOGY & ADDITIONAL TESTS:                   PGY-1 Progress Note discussed with attending    PAGER #: [[phone]] TILL 5:00 PM  PLEASE CONTACT ON CALL TEAM:  - On Call Team (Please refer to Dashawn) FROM 5:00 PM - 8:30PM  - Nightfloat Team FROM 8:30 -7:30 AM    INTERVAL HPI  Patient is a 75 y/o M (ambulates and ADL independently) w PMH of CAD (s/p CABG >10yrs ago), DM, HTN, and HLD, presents with right foot pain x2 weeks admitted for diabetic right foot ulcer. Patient was evaluated by infectious disease and started on IV unasyn for foot ulcer (wound culture growing GBS), XR showed cellulitis and MRI with osteomyelitis. Patient was evaluated by podiatry who performed right 5th metatarsal head resection. Patient underwent SIMON/PVR as per vascular evaluation recommendations which showed mild PVD. Patient also noted to have OREN, nephrology was consulted and recommended starting ACEI once Cr improves given significant proteinuria. Paitent was evaluated by cardiology given history of CAD, TTE showed G1DD and moderate to severe AS with recommendation for outpatient SABIHA. For HTN, patient's antihypertensives were titrated as tolerated, isosorbide was held and patient's amlodipine was discontinued, started on nifedipine ER 60 mg , metoprolol 100mg, and lisinopril 10mg and BP was well controlled prior to discharge.       OVERNIGHT EVENTS:   - Patient evaluated at bedside, complaining of pain in foot. Pain medication helped with pain. No new complaints.     REVIEW OF SYSTEMS:  CONSTITUTIONAL: No fever, weight loss, or fatigue  RESPIRATORY: No cough, wheezing, chills or hemoptysis; No shortness of breath  CARDIOVASCULAR: No chest pain, palpitations, dizziness, or leg swelling  GASTROINTESTINAL: No abdominal pain. No nausea, vomiting, or hematemesis; No diarrhea or constipation. No melena or hematochezia.  GENITOURINARY: No dysuria or hematuria, urinary frequency  NEUROLOGICAL: No headaches, memory loss, loss of strength, numbness, or tremors  SKIN: No itching, burning, rashes, or lesions     MEDICATIONS  (STANDING):  ampicillin/sulbactam  IVPB 3 Gram(s) IV Intermittent every 8 hours  aspirin  chewable 81 milliGRAM(s) Oral daily  atorvastatin 40 milliGRAM(s) Oral at bedtime  cyanocobalamin 1000 MICROGram(s) Oral daily  dextrose 5%. 1000 milliLiter(s) (50 mL/Hr) IV Continuous <Continuous>  dextrose 5%. 1000 milliLiter(s) (100 mL/Hr) IV Continuous <Continuous>  ergocalciferol 96002 Unit(s) Oral <User Schedule>  ferrous    sulfate 325 milliGRAM(s) Oral daily  glucagon  Injectable 1 milliGRAM(s) IntraMuscular once  insulin lispro (ADMELOG) corrective regimen sliding scale   SubCutaneous Before meals and at bedtime  lactated ringers. 1000 milliLiter(s) (75 mL/Hr) IV Continuous <Continuous>  lisinopril 10 milliGRAM(s) Oral daily  metoprolol succinate  milliGRAM(s) Oral daily  NIFEdipine XL 60 milliGRAM(s) Oral daily  oxycodone    5 mG/acetaminophen 325 mG 1 Tablet(s) Oral every 8 hours    MEDICATIONS  (PRN):      Vital Signs Last 24 Hrs  T(C): 36.9 (24 Aug 2021 10:20), Max: 36.9 (23 Aug 2021 22:05)  T(F): 98.4 (24 Aug 2021 10:20), Max: 98.5 (23 Aug 2021 22:05)  HR: 67 (24 Aug 2021 10:20) (67 - 78)  BP: 165/65 (24 Aug 2021 10:20) (158/60 - 191/77)  BP(mean): --  RR: 17 (24 Aug 2021 10:20) (17 - 18)  SpO2: 96% (24 Aug 2021 10:20) (96% - 100%)    PHYSICAL EXAMINATION:  GENERAL: NAD, AAOx  HEAD: AT/NC  EYES: conjunctiva and sclera clear  NECK: supple, No JVD noted, Normal thyroid  CHEST/LUNG: CTABL; no rales, rhonchi, wheezing, or rubs  HEART: regular rate and rhythm; no murmurs, rubs, or gallops  ABDOMEN: soft, nontender, nondistended; Bowel sounds present  EXTREMITIES:  2+ Peripheral Pulses, No clubbing, cyanosis, or edema  SKIN: warm dry                          12.5   6.32  )-----------( 270      ( 24 Aug 2021 06:21 )             37.6     08-24    139  |  106  |  17  ----------------------------<  98  3.8   |  26  |  1.18    Ca    8.8      24 Aug 2021 06:21  Phos  3.0     08-24  Mg     2.3     08-24    TPro  7.9  /  Alb  2.9<L>  /  TBili  0.7  /  DBili  x   /  AST  30  /  ALT  23  /  AlkPhos  124<H>  08-24    LIVER FUNCTIONS - ( 24 Aug 2021 06:21 )  Alb: 2.9 g/dL / Pro: 7.9 g/dL / ALK PHOS: 124 U/L / ALT: 23 U/L DA / AST: 30 U/L / GGT: x                 COVID-19 PCR: NotDetec (18 Aug 2021 18:23)  COVID-19 PCR: NotDetec (14 Aug 2021 09:41)      CAPILLARY BLOOD GLUCOSE      POCT Blood Glucose.: 103 mg/dL (24 Aug 2021 08:30)  POCT Blood Glucose.: 121 mg/dL (23 Aug 2021 22:22)  POCT Blood Glucose.: 140 mg/dL (23 Aug 2021 16:52)  POCT Blood Glucose.: 131 mg/dL (23 Aug 2021 11:27)      RADIOLOGY & ADDITIONAL TESTS:                   PGY-1 Progress Note discussed with attending    PAGER #: [[phone]] TILL 5:00 PM  PLEASE CONTACT ON CALL TEAM:  - On Call Team (Please refer to Dashawn) FROM 5:00 PM - 8:30PM  - Nightfloat Team FROM 8:30 -7:30 AM    INTERVAL HPI  Patient is a 77 y/o M (ambulates and ADL independently) w PMH of CAD (s/p CABG >10yrs ago), DM, HTN, and HLD, presents with right foot pain x2 weeks admitted for diabetic right foot ulcer. Patient was evaluated by infectious disease and started on IV unasyn for foot ulcer (wound culture growing GBS), XR showed cellulitis and MRI with osteomyelitis. Patient was evaluated by podiatry who performed right 5th metatarsal head resection. Patient underwent SIMON/PVR as per vascular evaluation recommendations which showed mild PVD. Patient also noted to have OREN, nephrology was consulted and recommended starting ACEI once Cr improves given significant proteinuria. Paitent was evaluated by cardiology given history of CAD, TTE showed G1DD and moderate to severe AS with recommendation for outpatient SABIHA. For HTN, patient's antihypertensives were titrated as tolerated, isosorbide was held and patient's amlodipine was discontinued, started on nifedipine ER 60 mg ,metoprolol 100mg, and lisinopril 10mg and BP was well controlled prior to discharge.       OVERNIGHT EVENTS:   - Patient evaluated at bedside, complaining of pain in foot. Pain medication helped with pain. No new complaints.     REVIEW OF SYSTEMS:  CONSTITUTIONAL: No fever, weight loss, or fatigue  RESPIRATORY: No cough, wheezing, chills or hemoptysis; No shortness of breath  CARDIOVASCULAR: No chest pain, palpitations, dizziness, or leg swelling  GASTROINTESTINAL: No abdominal pain. No nausea, vomiting, or hematemesis; No diarrhea or constipation. No melena or hematochezia.  GENITOURINARY: No dysuria or hematuria, urinary frequency  NEUROLOGICAL: No headaches, memory loss, loss of strength, numbness, or tremors  SKIN: No itching, burning, rashes, or lesions     MEDICATIONS  (STANDING):  ampicillin/sulbactam  IVPB 3 Gram(s) IV Intermittent every 8 hours  aspirin  chewable 81 milliGRAM(s) Oral daily  atorvastatin 40 milliGRAM(s) Oral at bedtime  cyanocobalamin 1000 MICROGram(s) Oral daily  dextrose 5%. 1000 milliLiter(s) (50 mL/Hr) IV Continuous <Continuous>  dextrose 5%. 1000 milliLiter(s) (100 mL/Hr) IV Continuous <Continuous>  ergocalciferol 75746 Unit(s) Oral <User Schedule>  ferrous    sulfate 325 milliGRAM(s) Oral daily  glucagon  Injectable 1 milliGRAM(s) IntraMuscular once  insulin lispro (ADMELOG) corrective regimen sliding scale   SubCutaneous Before meals and at bedtime  lactated ringers. 1000 milliLiter(s) (75 mL/Hr) IV Continuous <Continuous>  lisinopril 10 milliGRAM(s) Oral daily  metoprolol succinate  milliGRAM(s) Oral daily  NIFEdipine XL 60 milliGRAM(s) Oral daily  oxycodone    5 mG/acetaminophen 325 mG 1 Tablet(s) Oral every 8 hours    MEDICATIONS  (PRN):      Vital Signs Last 24 Hrs  T(C): 36.9 (24 Aug 2021 10:20), Max: 36.9 (23 Aug 2021 22:05)  T(F): 98.4 (24 Aug 2021 10:20), Max: 98.5 (23 Aug 2021 22:05)  HR: 67 (24 Aug 2021 10:20) (67 - 78)  BP: 165/65 (24 Aug 2021 10:20) (158/60 - 191/77)  BP(mean): --  RR: 17 (24 Aug 2021 10:20) (17 - 18)  SpO2: 96% (24 Aug 2021 10:20) (96% - 100%)    PHYSICAL EXAMINATION:  GENERAL: NAD, AAOx  HEAD: AT/NC  EYES: conjunctiva and sclera clear  NECK: supple, No JVD noted, Normal thyroid  CHEST/LUNG: CTABL; no rales, rhonchi, wheezing, or rubs  HEART: regular rate and rhythm; no murmurs, rubs, or gallops  ABDOMEN: soft, nontender, nondistended; Bowel sounds present  EXTREMITIES:  2+ Peripheral Pulses, No clubbing, cyanosis, or edema  SKIN: warm dry                          12.5   6.32  )-----------( 270      ( 24 Aug 2021 06:21 )             37.6     08-24    139  |  106  |  17  ----------------------------<  98  3.8   |  26  |  1.18    Ca    8.8      24 Aug 2021 06:21  Phos  3.0     08-24  Mg     2.3     08-24    TPro  7.9  /  Alb  2.9<L>  /  TBili  0.7  /  DBili  x   /  AST  30  /  ALT  23  /  AlkPhos  124<H>  08-24    LIVER FUNCTIONS - ( 24 Aug 2021 06:21 )  Alb: 2.9 g/dL / Pro: 7.9 g/dL / ALK PHOS: 124 U/L / ALT: 23 U/L DA / AST: 30 U/L / GGT: x                 COVID-19 PCR: NotDetec (18 Aug 2021 18:23)  COVID-19 PCR: NotDetec (14 Aug 2021 09:41)      CAPILLARY BLOOD GLUCOSE      POCT Blood Glucose.: 103 mg/dL (24 Aug 2021 08:30)  POCT Blood Glucose.: 121 mg/dL (23 Aug 2021 22:22)  POCT Blood Glucose.: 140 mg/dL (23 Aug 2021 16:52)  POCT Blood Glucose.: 131 mg/dL (23 Aug 2021 11:27)      RADIOLOGY & ADDITIONAL TESTS:

## 2021-08-24 NOTE — DISCHARGE NOTE NURSING/CASE MANAGEMENT/SOCIAL WORK - PATIENT PORTAL LINK FT
You can access the FollowMyHealth Patient Portal offered by [facility] by registering at the following website: [email or web address]. By joining [facility] FollowMyHealth portal, you will also be able to view your health information using other applications (apps) compatible with our system.

## 2021-08-24 NOTE — DISCHARGE NOTE NURSING/CASE MANAGEMENT/SOCIAL WORK - NSDCPEFALRISK_GEN_ALL_CORE
For information on Fall & injury Prevention, visit [email or web address]

## 2021-08-24 NOTE — PROGRESS NOTE ADULT - SUBJECTIVE AND OBJECTIVE BOX
NEPHROLOGY MEDICAL CARE, [facility] - Dr. Tariq Herrera/ Dr. Lauren Lopez/ Dr. Dre Call/ Dr. Destiny Knott    Patient was seen and examined at bedside.    CC: patient is okay and no sob    Vital Signs Last 24 Hrs  T(C): 36.8 (24 Aug 2021 13:59), Max: 36.9 (23 Aug 2021 22:05)  T(F): 98.2 (24 Aug 2021 13:59), Max: 98.5 (23 Aug 2021 22:05)  HR: 68 (24 Aug 2021 13:59) (67 - 73)  BP: 125/47 (24 Aug 2021 13:59) (125/47 - 191/77)  BP(mean): --  RR: 18 (24 Aug 2021 13:59) (17 - 18)  SpO2: 96% (24 Aug 2021 13:59) (96% - 100%)    08-23 @ 07:01  -  08-24 @ 07:00  --------------------------------------------------------  IN: 0 mL / OUT: 600 mL / NET: -600 mL        PHYSICAL EXAM:  General: No acute respiratory distress.  Eyes: conjunctiva and sclera clear  ENMT: Atraumatic, Normocephalic, supple, No JVD present. Moist mucous membranes  Respiratory: Clear to percussion bilaterally; No rales, rhonchi, wheezing  Cardiovasular: S1S2+; no r/g; systolic murmur  Gastrointestinal: Soft, Non-tender, Nondistended; Bowel sounds present  Neuro:  Awake, Alert & Oriented X3  Ext:  No edema, No Cyanosis; Rt foot bandage.  Skin: No visible rashes    MEDICATIONS:  MEDICATIONS  (STANDING):  amoxicillin  875 milliGRAM(s)/clavulanate 1 Tablet(s) Oral two times a day  aspirin  chewable 81 milliGRAM(s) Oral daily  atorvastatin 40 milliGRAM(s) Oral at bedtime  cyanocobalamin 1000 MICROGram(s) Oral daily  dextrose 5%. 1000 milliLiter(s) (50 mL/Hr) IV Continuous <Continuous>  dextrose 5%. 1000 milliLiter(s) (100 mL/Hr) IV Continuous <Continuous>  ergocalciferol 75610 Unit(s) Oral <User Schedule>  ferrous    sulfate 325 milliGRAM(s) Oral daily  glucagon  Injectable 1 milliGRAM(s) IntraMuscular once  insulin glargine Injectable (LANTUS) 10 Unit(s) SubCutaneous at bedtime  insulin lispro (ADMELOG) corrective regimen sliding scale   SubCutaneous Before meals and at bedtime  lactated ringers. 1000 milliLiter(s) (75 mL/Hr) IV Continuous <Continuous>  metoprolol succinate  milliGRAM(s) Oral daily  NIFEdipine XL 60 milliGRAM(s) Oral daily  oxycodone    5 mG/acetaminophen 325 mG 1 Tablet(s) Oral every 8 hours    MEDICATIONS  (PRN):          LABS:                        12.5   6.32  )-----------( 270      ( 24 Aug 2021 06:21 )             37.6     08-24    139  |  106  |  17  ----------------------------<  98  3.8   |  26  |  1.18    Ca    8.8      24 Aug 2021 06:21  Phos  3.0     08-24  Mg     2.3     08-24    TPro  7.9  /  Alb  2.9<L>  /  TBili  0.7  /  DBili  x   /  AST  30  /  ALT  23  /  AlkPhos  124<H>  08-24        Magnesium, Serum: 2.3 mg/dL (08-24 @ 06:21)  Phosphorus Level, Serum: 3.0 mg/dL (08-24 @ 06:21)    Urine studies    PTH and Vit D:

## 2021-08-24 NOTE — PROGRESS NOTE ADULT - PROBLEM SELECTOR PLAN 1
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 Sutures well adhered. No wound dehiscence noted to surgical site. Sanguinous drainage noted to bandage. No clinical signs of infection present.   Percocet q8h for pain  surgical pathology completed.   Unasyn discontinued, switched to Augmentin 875 for 5 days ( Day 1)   PT recommending STEVAN.

## 2021-08-24 NOTE — PROGRESS NOTE ADULT - PROBLEM SELECTOR PLAN 4
- h/o HTN, non-compliant w/ medications; previously on metoprolol 200mg qd, [id number] qd, and isosorbide 30mg qd      c/w nifedipine 60 mg qd and metoprolol at 100 mg daily, Lisinopril 10mg daily added

## 2021-08-24 NOTE — PROGRESS NOTE ADULT - ASSESSMENT
Patient is a 76y old  Male who ambulates and performs ADL independently, w/ PMH of CAD (s/p CABG >10yrs ago), DM, HTN, and HLD, now presents to the ER for evaluation of right foot pain x2 weeks.  He states stepping on a dog treat approximately 3 weeks ago, which he notice a development of wound at the time. Right foot pain developed a week later. It was mild initially, but progressive has gotten worsen. Patient has been non-compliant with medications for the past 2 months, because he has been feeling great. ON admission, he found to have No fever but tachycardia. He has started on Unasyn and Vancomycin, and the ID consult requested to assist with further evaluation and antibiotic management.    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 May change Unasyn to Oral Augmentin 875 mg [id number] to continue until 8/30/21, since pathology shows clear margin  2. Monitor kidney function  3. Wound care as per Podiatry   4. OOB to chair   d/w House staff     Attending Attestation:    Spent more than 35 minutes on total encounter, more than 50 % of the visit was spent counseling and/or coordinating care by the Attending physician.   Patient is a 76y old  Male who ambulates and performs ADL independently, w/ PMH of CAD (s/p CABG >10yrs ago), DM, HTN, and HLD, now presents to the ER for evaluation of right foot pain x2 weeks.  He states stepping on a dog treat approximately 3 weeks ago, which he notice a development of wound at the time. Right foot pain developed a week later. It was mild initially, but progressive has gotten worsen. Patient has been non-compliant with medications for the past 2 months, because he has been feeling great. ON admission, he found to have No fever but tachycardia. He has started on Unasyn and Vancomycin, and the ID consult requested to assist with further evaluation and antibiotic management.    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 May change Unasyn to Oral Augmentin 875 mg [id number] to continue until 8/30/21, since pathology shows clear margin  2. Monitor kidney function  3. Wound care as per Podiatry   4. OOB to chair     d/w House staff     Attending Attestation:    Spent more than 35 minutes on total encounter, more than 50 % of the visit was spent counseling and/or coordinating care by the Attending physician.

## 2021-08-24 NOTE — DISCHARGE NOTE NURSING/CASE MANAGEMENT/SOCIAL WORK - NSDCVIVACCINE_GEN_ALL_CORE_FT
influenza, injectable, quadrivalent, preservative free; 19-Oct-2016 15:16; Liyah Hawk (RN); [employer]; [id number]; IntraMuscular; Deltoid Left.; 0.5 milliLiter(s); VIS (VIS Published: 07-Aug-2015, VIS Presented: 19-Oct-2016);

## 2021-08-24 NOTE — PROGRESS NOTE ADULT - SUBJECTIVE AND OBJECTIVE BOX
C A R D I O L O G Y  **********************************    DATE OF SERVICE: 08-24-21    Patient denies chest pain or shortness of breath.   Review of symptoms otherwise negative.    ampicillin/sulbactam  IVPB 3 Gram(s) IV Intermittent every 8 hours  aspirin  chewable 81 milliGRAM(s) Oral daily  atorvastatin 40 milliGRAM(s) Oral at bedtime  cyanocobalamin 1000 MICROGram(s) Oral daily  dextrose 5%. 1000 milliLiter(s) IV Continuous <Continuous>  dextrose 5%. 1000 milliLiter(s) IV Continuous <Continuous>  ergocalciferol 41594 Unit(s) Oral <User Schedule>  ferrous    sulfate 325 milliGRAM(s) Oral daily  glucagon  Injectable 1 milliGRAM(s) IntraMuscular once  insulin lispro (ADMELOG) corrective regimen sliding scale   SubCutaneous Before meals and at bedtime  lactated ringers. 1000 milliLiter(s) IV Continuous <Continuous>  lisinopril 10 milliGRAM(s) Oral daily  metoprolol succinate  milliGRAM(s) Oral daily  NIFEdipine XL 60 milliGRAM(s) Oral daily  oxycodone    5 mG/acetaminophen 325 mG 1 Tablet(s) Oral every 8 hours                            12.5   6.32  )-----------( 270      ( 24 Aug 2021 06:21 )             37.6       Hemoglobin: 12.5 g/dL (08-24 @ 06:21)  Hemoglobin: 11.6 g/dL (08-23 @ 06:15)  Hemoglobin: 11.9 g/dL (08-22 @ 07:15)  Hemoglobin: 11.5 g/dL (08-21 @ 06:24)  Hemoglobin: 11.2 g/dL (08-20 @ 06:08)      08-24    139  |  106  |  17  ----------------------------<  98  3.8   |  26  |  1.18    Ca    8.8      24 Aug 2021 06:21  Phos  3.0     08-24  Mg     2.3     08-24    TPro  7.9  /  Alb  2.9<L>  /  TBili  0.7  /  DBili  x   /  AST  30  /  ALT  23  /  AlkPhos  124<H>  08-24    Creatinine Trend: 1.18<--, 1.22<--, 1.41<--, 1.37<--, 1.30<--, 1.34<--    COAGS:     T(C): 36.9 (08-24-21 @ 10:20), Max: 36.9 (08-23-21 @ 22:05)  HR: 67 (08-24-21 @ 10:20) (67 - 78)  BP: 165/65 (08-24-21 @ 10:20) (158/60 - 191/77)  RR: 17 (08-24-21 @ 10:20) (17 - 18)  SpO2: 96% (08-24-21 @ 10:20) (96% - 100%)  Wt(kg): --    I&O's Summary    23 Aug 2021 07:01  -  24 Aug 2021 07:00  --------------------------------------------------------  IN: 0 mL / OUT: 600 mL / NET: -600 mL    HEENT:  (-)icterus (-)pallor  CV: N S1 S2 1/6 TRINIDAD (+)2 Pulses B/l  Resp:  Clear to ausculatation B/L, normal effort  GI: (+) BS Soft, NT, ND  Lymph:  (-)Edema, (-)obvious lymphadenopathy  Skin: Warm to touch, Normal turgor  Psych: Appropriate mood and affect          ASSESSMENT/PLAN: 	76y  Male ambulates and performs ADL independently, w/ PMH of CAD (s/p CABG >10yrs ago), DM, HTN, and HLD, presents with right foot pain x2 weeks. Cardiology consulted for management of cad s/p 5th partial ray amputation.    - progressing well  - Echo noted, CINTIA of 1.0 cm moderate to severe AS, will need SABIHA and AV w/u once acute issues resolve.  He denies CP, SOB or LOC  - no clinical CHF  - Abx per ID  - cont ASA, statin BB  - Will need out patient cardiac f/u [phone]    Zak Wilkins MD, [facility]  BEEPER [phone]

## 2021-08-24 NOTE — PROGRESS NOTE ADULT - PROBLEM SELECTOR PLAN 3
- h/o CAD (s/p CABG >10yr ago), non-compliant w/ medications  - EKG showed NSTEMI, but no changes from jan2021 EKG  - trop 0.22>0.22  - c/w ASA, statin, metoprolol    -TTE with G1dd, moderate to severe AS  - Cardio, Dr. Wilkins consulted SABIHA, EF 55-60%  -recommending SABIHA and AV w/u once acute issues resolve.

## 2021-08-25 LAB
CULTURE RESULTS: SIGNIFICANT CHANGE UP
SPECIMEN SOURCE: SIGNIFICANT CHANGE UP

## 2021-09-16 ENCOUNTER — INPATIENT (INPATIENT)
Facility: HOSPITAL | Age: 76
LOS: 47 days | Discharge: NURSING, REHAB OR LONG-TERM CARE | DRG: 463 | End: 2021-11-03
Attending: INTERNAL MEDICINE | Admitting: INTERNAL MEDICINE
Payer: COMMERCIAL

## 2021-09-16 VITALS
WEIGHT: 176.37 LBS | HEART RATE: 82 BPM | RESPIRATION RATE: 18 BRPM | DIASTOLIC BLOOD PRESSURE: 55 MMHG | OXYGEN SATURATION: 98 % | HEIGHT: 68 IN | TEMPERATURE: 98 F | SYSTOLIC BLOOD PRESSURE: 116 MMHG

## 2021-09-16 DIAGNOSIS — Z95.1 PRESENCE OF AORTOCORONARY BYPASS GRAFT: Chronic | ICD-10-CM

## 2021-09-16 DIAGNOSIS — R51.9 HEADACHE, UNSPECIFIED: ICD-10-CM

## 2021-09-16 DIAGNOSIS — Z98.89 OTHER SPECIFIED POSTPROCEDURAL STATES: Chronic | ICD-10-CM

## 2021-09-16 LAB
ALBUMIN SERPL ELPH-MCNC: 3.3 G/DL — LOW (ref 3.5–5)
ALP SERPL-CCNC: 122 U/L — HIGH (ref 40–120)
ALT FLD-CCNC: 23 U/L DA — SIGNIFICANT CHANGE UP (ref 10–60)
ANION GAP SERPL CALC-SCNC: 7 MMOL/L — SIGNIFICANT CHANGE UP (ref 5–17)
AST SERPL-CCNC: 21 U/L — SIGNIFICANT CHANGE UP (ref 10–40)
BASOPHILS # BLD AUTO: 0.02 K/UL — SIGNIFICANT CHANGE UP (ref 0–0.2)
BASOPHILS NFR BLD AUTO: 0.2 % — SIGNIFICANT CHANGE UP (ref 0–2)
BILIRUB SERPL-MCNC: 0.6 MG/DL — SIGNIFICANT CHANGE UP (ref 0.2–1.2)
BUN SERPL-MCNC: 20 MG/DL — HIGH (ref 7–18)
CALCIUM SERPL-MCNC: 8.9 MG/DL — SIGNIFICANT CHANGE UP (ref 8.4–10.5)
CHLORIDE SERPL-SCNC: 110 MMOL/L — HIGH (ref 96–108)
CO2 SERPL-SCNC: 20 MMOL/L — LOW (ref 22–31)
CREAT SERPL-MCNC: 1.7 MG/DL — HIGH (ref 0.5–1.3)
EOSINOPHIL # BLD AUTO: 0.13 K/UL — SIGNIFICANT CHANGE UP (ref 0–0.5)
EOSINOPHIL NFR BLD AUTO: 1.5 % — SIGNIFICANT CHANGE UP (ref 0–6)
ERYTHROCYTE [SEDIMENTATION RATE] IN BLOOD: 77 MM/HR — HIGH (ref 0–20)
GLUCOSE SERPL-MCNC: 152 MG/DL — HIGH (ref 70–99)
HCT VFR BLD CALC: 34 % — LOW (ref 39–50)
HGB BLD-MCNC: 11.2 G/DL — LOW (ref 13–17)
IMM GRANULOCYTES NFR BLD AUTO: 0.6 % — SIGNIFICANT CHANGE UP (ref 0–1.5)
LACTATE SERPL-SCNC: 1.4 MMOL/L — SIGNIFICANT CHANGE UP (ref 0.7–2)
LYMPHOCYTES # BLD AUTO: 1.02 K/UL — SIGNIFICANT CHANGE UP (ref 1–3.3)
LYMPHOCYTES # BLD AUTO: 12.1 % — LOW (ref 13–44)
MCHC RBC-ENTMCNC: 29.4 PG — SIGNIFICANT CHANGE UP (ref 27–34)
MCHC RBC-ENTMCNC: 32.9 GM/DL — SIGNIFICANT CHANGE UP (ref 32–36)
MCV RBC AUTO: 89.2 FL — SIGNIFICANT CHANGE UP (ref 80–100)
MONOCYTES # BLD AUTO: 0.38 K/UL — SIGNIFICANT CHANGE UP (ref 0–0.9)
MONOCYTES NFR BLD AUTO: 4.5 % — SIGNIFICANT CHANGE UP (ref 2–14)
NEUTROPHILS # BLD AUTO: 6.8 K/UL — SIGNIFICANT CHANGE UP (ref 1.8–7.4)
NEUTROPHILS NFR BLD AUTO: 81.1 % — HIGH (ref 43–77)
NRBC # BLD: 0 /100 WBCS — SIGNIFICANT CHANGE UP (ref 0–0)
PLATELET # BLD AUTO: 187 K/UL — SIGNIFICANT CHANGE UP (ref 150–400)
POTASSIUM SERPL-MCNC: 5 MMOL/L — SIGNIFICANT CHANGE UP (ref 3.5–5.3)
POTASSIUM SERPL-SCNC: 5 MMOL/L — SIGNIFICANT CHANGE UP (ref 3.5–5.3)
PROT SERPL-MCNC: 7.8 G/DL — SIGNIFICANT CHANGE UP (ref 6–8.3)
RBC # BLD: 3.81 M/UL — LOW (ref 4.2–5.8)
RBC # FLD: 13.5 % — SIGNIFICANT CHANGE UP (ref 10.3–14.5)
SARS-COV-2 RNA SPEC QL NAA+PROBE: SIGNIFICANT CHANGE UP
SODIUM SERPL-SCNC: 137 MMOL/L — SIGNIFICANT CHANGE UP (ref 135–145)
WBC # BLD: 8.4 K/UL — SIGNIFICANT CHANGE UP (ref 3.8–10.5)
WBC # FLD AUTO: 8.4 K/UL — SIGNIFICANT CHANGE UP (ref 3.8–10.5)

## 2021-09-16 PROCEDURE — 99284 EMERGENCY DEPT VISIT MOD MDM: CPT

## 2021-09-16 PROCEDURE — 73630 X-RAY EXAM OF FOOT: CPT | Mod: 26,RT

## 2021-09-16 RX ORDER — PIPERACILLIN AND TAZOBACTAM 4; .5 G/20ML; G/20ML
3.38 INJECTION, POWDER, LYOPHILIZED, FOR SOLUTION INTRAVENOUS ONCE
Refills: 0 | Status: COMPLETED | OUTPATIENT
Start: 2021-09-16 | End: 2021-09-16

## 2021-09-16 RX ORDER — OXYCODONE AND ACETAMINOPHEN 5; 325 MG/1; MG/1
1 TABLET ORAL ONCE
Refills: 0 | Status: DISCONTINUED | OUTPATIENT
Start: 2021-09-16 | End: 2021-09-16

## 2021-09-16 RX ORDER — VANCOMYCIN HCL 1 G
1000 VIAL (EA) INTRAVENOUS ONCE
Refills: 0 | Status: COMPLETED | OUTPATIENT
Start: 2021-09-16 | End: 2021-09-16

## 2021-09-16 RX ADMIN — Medication 250 MILLIGRAM(S): at 16:47

## 2021-09-16 RX ADMIN — OXYCODONE AND ACETAMINOPHEN 1 TABLET(S): 5; 325 TABLET ORAL at 14:32

## 2021-09-16 RX ADMIN — OXYCODONE AND ACETAMINOPHEN 1 TABLET(S): 5; 325 TABLET ORAL at 15:47

## 2021-09-16 RX ADMIN — PIPERACILLIN AND TAZOBACTAM 200 GRAM(S): 4; .5 INJECTION, POWDER, LYOPHILIZED, FOR SOLUTION INTRAVENOUS at 16:01

## 2021-09-16 NOTE — ED PROVIDER NOTE - PROGRESS NOTE DETAILS
Received signout from Dr. Dykes.  [facility] normal.  Patient feels better after headache meds.  Will discharge. Admitting to Medicine for MRI per Podiatry recs

## 2021-09-16 NOTE — ED PROVIDER NOTE - OBJECTIVE STATEMENT
75 y/o  male with PMHx of HTN, HLD, CAD, DM, CABG with recent 5th metatarsal head resection done by podiatry presents with right foot 5th digit pain, swelling, and foul discharge.  pt poor historian, info obtained from daughter who was at bedside.  The patient has been complaining of worsening pain for the past 4 days associated with foul discharge.  denies fever.

## 2021-09-16 NOTE — CONSULT NOTE ADULT - SUBJECTIVE AND OBJECTIVE BOX
Podiatry HPI: 76 year old male with a PMHx of DM, HTN, HLD, CAD to ED presents to the ED for a Right Foot s/p 5th foot partial ray resection and fillet toe flap. Patient states that he has sharp localized non-radiating pain to the Right foot 5th metatarsal. States that after his surgery, he did not see a podiatrist and he came into the ED because he saw drainage and was having pain. Denies any constitutional symptoms of N/V/C/F/SOB. Denies any other pedal complaints at this time. Denies calf pain today, bilaterally.    PMH:HTN (hypertension)    HLD (hyperlipidemia)    DM (diabetes mellitus)    CAD (coronary artery disease)    Glaucoma, angle-closure      Allergies: No Known Allergies    Medications: vancomycin  IVPB. 1000 milliGRAM(s) IV Intermittent once    FH:Family history of acute myocardial infarction (Father)    Family history of diabetes mellitus (Mother, Sibling)    Family history of hypertension (Mother, Sibling)    Family history of hyperlipidemia (Mother, Sibling)      PSX: No significant past surgical history    S/P CABG (coronary artery bypass graft)    History of thyroid surgery      SH: Social History:      Vital Signs Last 24 Hrs  T(C): 36.7 (16 Sep 2021 15:44), Max: 36.7 (16 Sep 2021 13:20)  T(F): 98 (16 Sep 2021 15:44), Max: 98 (16 Sep 2021 13:20)  HR: 80 (16 Sep 2021 15:44) (80 - 82)  BP: 144/70 (16 Sep 2021 15:44) (116/55 - 144/70)  BP(mean): --  RR: 18 (16 Sep 2021 15:44) (18 - 18)  SpO2: 100% (16 Sep 2021 15:44) (98% - 100%)    LABS                        11.2   8.40  )-----------( 187      ( 16 Sep 2021 16:03 )             34.0               09-16    137  |  110<H>  |  20<H>  ----------------------------<  152<H>  5.0   |  20<L>  |  1.70<H>    Ca    8.9      16 Sep 2021 14:24    TPro  7.8  /  Alb  3.3<L>  /  TBili  0.6  /  DBili  x   /  AST  21  /  ALT  23  /  AlkPhos  122<H>  09-16     WBC Count: 8.40 K/uL (09-16-21 @ 16:03)        PHYSICAL EXAM  GEN: SHER ROBERT is a pleasant well-nourished, well developed 76y Male in no acute distress, alert awake, and oriented to person, place and time.   LE Focused:    Vasc:  DP/PT pulses were faintly palpable, bilateral. DP/PT pulses were monophasic on doppler, bilaterally. CFT<3 seconds to all digits.   Derm: Surgical Incision site dehiscence noted to the lateral aspect of the right foot. with the sutures intact (7.5cm x 3.5cm x 2.5cm), tunnels to the proximal aspect to the bone, +PTB, hyperpigmentation surrounding the wound, erythematous when compared to the contralateral side, no drainage. No malodor noted. Fibrogranular wound base noted. No streaking noted.   Neuro: Protective sensation diminished, b/l  MSK: +4/5 muscle strength noted to the pedal compartments. 5th digit amputation on right foot, noted.     Imaging: ?xray  Cultures:     A:   Right Foot Wound     P:  Patient evaluated, chart reviewed  Xrays reviewed  Ordered ESR, CRP   Verbal consent obtained  Wound was evaluated. Sutures were removed.   Aseptic sharp excisional debridement was performed using a sterile #15 blade down to an including subcutaneous tissue without any incident. Pt tolerated the procedure well. Site was thoroughly flushed with saline. Dressed with santyl 4x4 gauze, abd pad, rebecca  secured with ACE.    Discussed and Seen Dr. Lara   Podiatry HPI: 76 year old male with a PMHx of DM, HTN, HLD, CAD to ED presents to the ED for a Right Foot s/p 5th foot partial ray resection and fillet toe flap. Patient states that he has sharp localized non-radiating pain to the Right foot 5th metatarsal. States that after his surgery, he did not see a podiatrist and he came into the ED because he saw drainage and was having pain. Denies any constitutional symptoms of N/V/C/F/SOB. Denies any other pedal complaints at this time. Denies calf pain today, bilaterally.    PMH:HTN (hypertension)    HLD (hyperlipidemia)    DM (diabetes mellitus)    CAD (coronary artery disease)    Glaucoma, angle-closure      Allergies: No Known Allergies    Medications: vancomycin  IVPB. 1000 milliGRAM(s) IV Intermittent once    FH:Family history of acute myocardial infarction (Father)    Family history of diabetes mellitus (Mother, Sibling)    Family history of hypertension (Mother, Sibling)    Family history of hyperlipidemia (Mother, Sibling)      PSX: No significant past surgical history    S/P CABG (coronary artery bypass graft)    History of thyroid surgery      SH: Social History:      Vital Signs Last 24 Hrs  T(C): 36.7 (16 Sep 2021 15:44), Max: 36.7 (16 Sep 2021 13:20)  T(F): 98 (16 Sep 2021 15:44), Max: 98 (16 Sep 2021 13:20)  HR: 80 (16 Sep 2021 15:44) (80 - 82)  BP: 144/70 (16 Sep 2021 15:44) (116/55 - 144/70)  BP(mean): --  RR: 18 (16 Sep 2021 15:44) (18 - 18)  SpO2: 100% (16 Sep 2021 15:44) (98% - 100%)    LABS                        11.2   8.40  )-----------( 187      ( 16 Sep 2021 16:03 )             34.0               09-16    137  |  110<H>  |  20<H>  ----------------------------<  152<H>  5.0   |  20<L>  |  1.70<H>    Ca    8.9      16 Sep 2021 14:24    TPro  7.8  /  Alb  3.3<L>  /  TBili  0.6  /  DBili  x   /  AST  21  /  ALT  23  /  AlkPhos  122<H>  09-16     WBC Count: 8.40 K/uL (09-16-21 @ 16:03)        PHYSICAL EXAM  GEN: SHER ROBERT is a pleasant well-nourished, well developed 76y Male in no acute distress, alert awake, and oriented to person, place and time.   LE Focused:    Vasc:  DP/PT pulses were faintly palpable, bilateral. DP/PT pulses were monophasic on doppler, bilaterally. CFT<3 seconds to all digits.   Derm: Surgical Incision site dehiscence noted to the lateral aspect of the right foot. with the sutures intact (7.5cm x 3.5cm x 2.5cm), tunnels to the proximal aspect to the bone, +PTB, hyperpigmentation surrounding the wound, erythematous when compared to the contralateral side, no drainage. No malodor noted. Fibrogranular wound base noted. No streaking noted.   Neuro: Protective sensation diminished, b/l  MSK: +4/5 muscle strength noted to the pedal compartments. 5th digit amputation on right foot, noted.     Imaging: ?xray  Cultures:     A:   Right Foot Wound     P:  Patient evaluated, chart reviewed  Xrays reviewed  Ordered ESR, CRP   Verbal consent obtained  Wound was evaluated. Sutures were removed.   Aseptic sharp excisional debridement was performed using a sterile #15 blade down to an including subcutaneous tissue without any incident. Pt tolerated the procedure well. Site was thoroughly flushed with saline. Dressed with santyl 4x4 gauze, abd pad, rebecca secured with ACE.    Patient tolerated the procedure well.  Dressed with santyl, dsd, ace  Podiatry will follow while in house  Discussed and Seen Dr. Lara

## 2021-09-16 NOTE — ED PROVIDER NOTE - NSFOLLOWUPINSTRUCTIONS_ED_ALL_ED_FT
You were seen in the emergency department for: headache  Your results report is attached.  Please take Tylenol 1000mg every 6 hours as needed for pain.   You may be contacted by the Emergency Department Referrals Coordinator to set up your follow-up appointment within 24-48 hours of your discharge, Monday to Friday. We recommend you follow up with: your primary care doctor.    Please return to the Emergency Department if you experience any of the following symptoms:   - Shortness of breath or trouble breathing  - Pressure, pain or tightness in the chest  - Face drooping, arm weakness or speech difficulty  - Persistence of severe vomiting  - Head injury or loss of consciousness  - Nonstop bleeding or an open wound    (1) Follow up with your primary care physician within the next 24-48 hours as discussed. In addition, we did not find evidence of a life threatening illness on your testing here today, but listed below are the specialists that will be necessary to see as an outpatient to continue the workup.  Please call the numbers listed below or [phone] to set up the necessary appointments.  (2) Take Tylenol (up to 1000mg or 1 g)  and/or Motrin (up to 600mg) up to every 6 hours as needed for pain.   (3) If you had an IV (intravenous) line placed, it was removed. Sometimes, after IV removal, that area can be tender for a few days; if it develops redness and swelling, those could be signs of infection; in which case, return to the Emergency Department for assessment.  (4) Please continue taking all of your home medications as directed.

## 2021-09-16 NOTE — ED PROVIDER NOTE - PATIENT PORTAL LINK FT
You can access the FollowMyHealth Patient Portal offered by [facility] by registering at the following website: [email or web address]. By joining [facility]’s FollowMyHealth portal, you will also be able to view your health information using other applications (apps) compatible with our system.

## 2021-09-17 DIAGNOSIS — E11.9 TYPE 2 DIABETES MELLITUS WITHOUT COMPLICATIONS: ICD-10-CM

## 2021-09-17 DIAGNOSIS — E78.5 HYPERLIPIDEMIA, UNSPECIFIED: ICD-10-CM

## 2021-09-17 DIAGNOSIS — Z29.9 ENCOUNTER FOR PROPHYLACTIC MEASURES, UNSPECIFIED: ICD-10-CM

## 2021-09-17 DIAGNOSIS — M86.9 OSTEOMYELITIS, UNSPECIFIED: ICD-10-CM

## 2021-09-17 DIAGNOSIS — I10 ESSENTIAL (PRIMARY) HYPERTENSION: ICD-10-CM

## 2021-09-17 LAB
ALBUMIN SERPL ELPH-MCNC: 3.2 G/DL — LOW (ref 3.5–5)
ALP SERPL-CCNC: 120 U/L — SIGNIFICANT CHANGE UP (ref 40–120)
ALT FLD-CCNC: 20 U/L DA — SIGNIFICANT CHANGE UP (ref 10–60)
ANION GAP SERPL CALC-SCNC: 7 MMOL/L — SIGNIFICANT CHANGE UP (ref 5–17)
AST SERPL-CCNC: 12 U/L — SIGNIFICANT CHANGE UP (ref 10–40)
BASOPHILS # BLD AUTO: 0.03 K/UL — SIGNIFICANT CHANGE UP (ref 0–0.2)
BASOPHILS NFR BLD AUTO: 0.5 % — SIGNIFICANT CHANGE UP (ref 0–2)
BILIRUB SERPL-MCNC: 0.8 MG/DL — SIGNIFICANT CHANGE UP (ref 0.2–1.2)
BUN SERPL-MCNC: 16 MG/DL — SIGNIFICANT CHANGE UP (ref 7–18)
CALCIUM SERPL-MCNC: 9.1 MG/DL — SIGNIFICANT CHANGE UP (ref 8.4–10.5)
CHLORIDE SERPL-SCNC: 108 MMOL/L — SIGNIFICANT CHANGE UP (ref 96–108)
CO2 SERPL-SCNC: 22 MMOL/L — SIGNIFICANT CHANGE UP (ref 22–31)
CREAT ?TM UR-MCNC: 65 MG/DL — SIGNIFICANT CHANGE UP
CREAT SERPL-MCNC: 1.44 MG/DL — HIGH (ref 0.5–1.3)
CRP SERPL-MCNC: 45 MG/L — HIGH
EOSINOPHIL # BLD AUTO: 0.23 K/UL — SIGNIFICANT CHANGE UP (ref 0–0.5)
EOSINOPHIL NFR BLD AUTO: 3.9 % — SIGNIFICANT CHANGE UP (ref 0–6)
GLUCOSE BLDC GLUCOMTR-MCNC: 128 MG/DL — HIGH (ref 70–99)
GLUCOSE BLDC GLUCOMTR-MCNC: 134 MG/DL — HIGH (ref 70–99)
GLUCOSE BLDC GLUCOMTR-MCNC: 156 MG/DL — HIGH (ref 70–99)
GLUCOSE BLDC GLUCOMTR-MCNC: 157 MG/DL — HIGH (ref 70–99)
GLUCOSE SERPL-MCNC: 140 MG/DL — HIGH (ref 70–99)
HCT VFR BLD CALC: 33.7 % — LOW (ref 39–50)
HGB BLD-MCNC: 10.7 G/DL — LOW (ref 13–17)
IMM GRANULOCYTES NFR BLD AUTO: 0.2 % — SIGNIFICANT CHANGE UP (ref 0–1.5)
LYMPHOCYTES # BLD AUTO: 1.27 K/UL — SIGNIFICANT CHANGE UP (ref 1–3.3)
LYMPHOCYTES # BLD AUTO: 21.8 % — SIGNIFICANT CHANGE UP (ref 13–44)
MAGNESIUM SERPL-MCNC: 1.9 MG/DL — SIGNIFICANT CHANGE UP (ref 1.6–2.6)
MCHC RBC-ENTMCNC: 28.8 PG — SIGNIFICANT CHANGE UP (ref 27–34)
MCHC RBC-ENTMCNC: 31.8 GM/DL — LOW (ref 32–36)
MCV RBC AUTO: 90.6 FL — SIGNIFICANT CHANGE UP (ref 80–100)
MONOCYTES # BLD AUTO: 0.35 K/UL — SIGNIFICANT CHANGE UP (ref 0–0.9)
MONOCYTES NFR BLD AUTO: 6 % — SIGNIFICANT CHANGE UP (ref 2–14)
NEUTROPHILS # BLD AUTO: 3.94 K/UL — SIGNIFICANT CHANGE UP (ref 1.8–7.4)
NEUTROPHILS NFR BLD AUTO: 67.6 % — SIGNIFICANT CHANGE UP (ref 43–77)
NRBC # BLD: 0 /100 WBCS — SIGNIFICANT CHANGE UP (ref 0–0)
PHOSPHATE SERPL-MCNC: 3 MG/DL — SIGNIFICANT CHANGE UP (ref 2.5–4.5)
PLATELET # BLD AUTO: 171 K/UL — SIGNIFICANT CHANGE UP (ref 150–400)
POTASSIUM SERPL-MCNC: 4.6 MMOL/L — SIGNIFICANT CHANGE UP (ref 3.5–5.3)
POTASSIUM SERPL-SCNC: 4.6 MMOL/L — SIGNIFICANT CHANGE UP (ref 3.5–5.3)
PROT SERPL-MCNC: 7.5 G/DL — SIGNIFICANT CHANGE UP (ref 6–8.3)
RBC # BLD: 3.72 M/UL — LOW (ref 4.2–5.8)
RBC # FLD: 13.4 % — SIGNIFICANT CHANGE UP (ref 10.3–14.5)
SODIUM SERPL-SCNC: 137 MMOL/L — SIGNIFICANT CHANGE UP (ref 135–145)
SODIUM UR-SCNC: 93 MMOL/L — SIGNIFICANT CHANGE UP
WBC # BLD: 5.83 K/UL — SIGNIFICANT CHANGE UP (ref 3.8–10.5)
WBC # FLD AUTO: 5.83 K/UL — SIGNIFICANT CHANGE UP (ref 3.8–10.5)

## 2021-09-17 PROCEDURE — 73718 MRI LOWER EXTREMITY W/O DYE: CPT | Mod: 26,RT

## 2021-09-17 RX ORDER — INSULIN LISPRO 100/ML
VIAL (ML) SUBCUTANEOUS
Refills: 0 | Status: DISCONTINUED | OUTPATIENT
Start: 2021-09-17 | End: 2021-09-22

## 2021-09-17 RX ORDER — HEPARIN SODIUM 5000 [USP'U]/ML
5000 INJECTION INTRAVENOUS; SUBCUTANEOUS EVERY 8 HOURS
Refills: 0 | Status: DISCONTINUED | OUTPATIENT
Start: 2021-09-17 | End: 2021-09-21

## 2021-09-17 RX ORDER — PIPERACILLIN AND TAZOBACTAM 4; .5 G/20ML; G/20ML
3.38 INJECTION, POWDER, LYOPHILIZED, FOR SOLUTION INTRAVENOUS EVERY 8 HOURS
Refills: 0 | Status: DISCONTINUED | OUTPATIENT
Start: 2021-09-17 | End: 2021-09-19

## 2021-09-17 RX ORDER — INSULIN GLARGINE 100 [IU]/ML
8 INJECTION, SOLUTION SUBCUTANEOUS AT BEDTIME
Refills: 0 | Status: DISCONTINUED | OUTPATIENT
Start: 2021-09-17 | End: 2021-09-20

## 2021-09-17 RX ORDER — ACETAMINOPHEN 500 MG
650 TABLET ORAL EVERY 6 HOURS
Refills: 0 | Status: DISCONTINUED | OUTPATIENT
Start: 2021-09-17 | End: 2021-09-18

## 2021-09-17 RX ORDER — ATORVASTATIN CALCIUM 80 MG/1
80 TABLET, FILM COATED ORAL AT BEDTIME
Refills: 0 | Status: DISCONTINUED | OUTPATIENT
Start: 2021-09-17 | End: 2021-09-22

## 2021-09-17 RX ORDER — INSULIN GLARGINE 100 [IU]/ML
4 INJECTION, SOLUTION SUBCUTANEOUS ONCE
Refills: 0 | Status: COMPLETED | OUTPATIENT
Start: 2021-09-17 | End: 2021-09-17

## 2021-09-17 RX ORDER — VANCOMYCIN HCL 1 G
1000 VIAL (EA) INTRAVENOUS EVERY 24 HOURS
Refills: 0 | Status: DISCONTINUED | OUTPATIENT
Start: 2021-09-17 | End: 2021-09-18

## 2021-09-17 RX ORDER — ERGOCALCIFEROL 1.25 MG/1
50000 CAPSULE ORAL
Refills: 0 | Status: DISCONTINUED | OUTPATIENT
Start: 2021-09-17 | End: 2021-09-22

## 2021-09-17 RX ORDER — METOPROLOL TARTRATE 50 MG
100 TABLET ORAL DAILY
Refills: 0 | Status: DISCONTINUED | OUTPATIENT
Start: 2021-09-17 | End: 2021-09-22

## 2021-09-17 RX ORDER — PREGABALIN 225 MG/1
1000 CAPSULE ORAL DAILY
Refills: 0 | Status: DISCONTINUED | OUTPATIENT
Start: 2021-09-17 | End: 2021-09-22

## 2021-09-17 RX ORDER — FERROUS SULFATE 325(65) MG
325 TABLET ORAL DAILY
Refills: 0 | Status: DISCONTINUED | OUTPATIENT
Start: 2021-09-17 | End: 2021-09-22

## 2021-09-17 RX ORDER — LISINOPRIL 2.5 MG/1
10 TABLET ORAL DAILY
Refills: 0 | Status: DISCONTINUED | OUTPATIENT
Start: 2021-09-17 | End: 2021-09-22

## 2021-09-17 RX ORDER — NIFEDIPINE 30 MG
60 TABLET, EXTENDED RELEASE 24 HR ORAL DAILY
Refills: 0 | Status: DISCONTINUED | OUTPATIENT
Start: 2021-09-17 | End: 2021-09-22

## 2021-09-17 RX ORDER — ENOXAPARIN SODIUM 100 MG/ML
40 INJECTION SUBCUTANEOUS DAILY
Refills: 0 | Status: DISCONTINUED | OUTPATIENT
Start: 2021-09-17 | End: 2021-09-17

## 2021-09-17 RX ORDER — INFLUENZA VIRUS VACCINE 15; 15; 15; 15 UG/.5ML; UG/.5ML; UG/.5ML; UG/.5ML
0.5 SUSPENSION INTRAMUSCULAR ONCE
Refills: 0 | Status: COMPLETED | OUTPATIENT
Start: 2021-09-17 | End: 2021-09-17

## 2021-09-17 RX ORDER — OXYCODONE AND ACETAMINOPHEN 5; 325 MG/1; MG/1
1 TABLET ORAL EVERY 6 HOURS
Refills: 0 | Status: DISCONTINUED | OUTPATIENT
Start: 2021-09-17 | End: 2021-09-18

## 2021-09-17 RX ORDER — TRAMADOL HYDROCHLORIDE 50 MG/1
50 TABLET ORAL ONCE
Refills: 0 | Status: DISCONTINUED | OUTPATIENT
Start: 2021-09-17 | End: 2021-09-17

## 2021-09-17 RX ORDER — ASPIRIN/CALCIUM CARB/MAGNESIUM 324 MG
81 TABLET ORAL DAILY
Refills: 0 | Status: DISCONTINUED | OUTPATIENT
Start: 2021-09-17 | End: 2021-09-22

## 2021-09-17 RX ADMIN — OXYCODONE AND ACETAMINOPHEN 1 TABLET(S): 5; 325 TABLET ORAL at 20:44

## 2021-09-17 RX ADMIN — HEPARIN SODIUM 5000 UNIT(S): 5000 INJECTION INTRAVENOUS; SUBCUTANEOUS at 13:46

## 2021-09-17 RX ADMIN — ATORVASTATIN CALCIUM 80 MILLIGRAM(S): 80 TABLET, FILM COATED ORAL at 22:43

## 2021-09-17 RX ADMIN — INSULIN GLARGINE 8 UNIT(S): 100 INJECTION, SOLUTION SUBCUTANEOUS at 22:53

## 2021-09-17 RX ADMIN — OXYCODONE AND ACETAMINOPHEN 1 TABLET(S): 5; 325 TABLET ORAL at 06:06

## 2021-09-17 RX ADMIN — Medication 60 MILLIGRAM(S): at 06:04

## 2021-09-17 RX ADMIN — TRAMADOL HYDROCHLORIDE 50 MILLIGRAM(S): 50 TABLET ORAL at 17:06

## 2021-09-17 RX ADMIN — INSULIN GLARGINE 4 UNIT(S): 100 INJECTION, SOLUTION SUBCUTANEOUS at 04:10

## 2021-09-17 RX ADMIN — Medication 250 MILLIGRAM(S): at 18:03

## 2021-09-17 RX ADMIN — PREGABALIN 1000 MICROGRAM(S): 225 CAPSULE ORAL at 11:20

## 2021-09-17 RX ADMIN — Medication 81 MILLIGRAM(S): at 11:20

## 2021-09-17 RX ADMIN — PIPERACILLIN AND TAZOBACTAM 25 GRAM(S): 4; .5 INJECTION, POWDER, LYOPHILIZED, FOR SOLUTION INTRAVENOUS at 13:46

## 2021-09-17 RX ADMIN — Medication 650 MILLIGRAM(S): at 23:32

## 2021-09-17 RX ADMIN — Medication 100 MILLIGRAM(S): at 06:04

## 2021-09-17 RX ADMIN — HEPARIN SODIUM 5000 UNIT(S): 5000 INJECTION INTRAVENOUS; SUBCUTANEOUS at 22:48

## 2021-09-17 RX ADMIN — Medication 1: at 22:38

## 2021-09-17 RX ADMIN — PIPERACILLIN AND TAZOBACTAM 25 GRAM(S): 4; .5 INJECTION, POWDER, LYOPHILIZED, FOR SOLUTION INTRAVENOUS at 22:43

## 2021-09-17 RX ADMIN — Medication 325 MILLIGRAM(S): at 11:20

## 2021-09-17 RX ADMIN — OXYCODONE AND ACETAMINOPHEN 1 TABLET(S): 5; 325 TABLET ORAL at 11:20

## 2021-09-17 RX ADMIN — Medication 650 MILLIGRAM(S): at 17:31

## 2021-09-17 RX ADMIN — OXYCODONE AND ACETAMINOPHEN 1 TABLET(S): 5; 325 TABLET ORAL at 12:30

## 2021-09-17 RX ADMIN — Medication 650 MILLIGRAM(S): at 16:39

## 2021-09-17 RX ADMIN — HEPARIN SODIUM 5000 UNIT(S): 5000 INJECTION INTRAVENOUS; SUBCUTANEOUS at 06:04

## 2021-09-17 RX ADMIN — LISINOPRIL 10 MILLIGRAM(S): 2.5 TABLET ORAL at 06:04

## 2021-09-17 RX ADMIN — OXYCODONE AND ACETAMINOPHEN 1 TABLET(S): 5; 325 TABLET ORAL at 04:36

## 2021-09-17 RX ADMIN — TRAMADOL HYDROCHLORIDE 50 MILLIGRAM(S): 50 TABLET ORAL at 18:03

## 2021-09-17 NOTE — CONSULT NOTE ADULT - ASSESSMENT
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  As per daughter, patient was taking tylenol which did not help his pain prompting the patient to ask his daughter to take him to the hospital. ON admission, he has no fever or Leukocytosis. The Xray of Right foot shows no Osteomyelitis but MRI of Right foot shows Lateral soft tissue wound with marrow signal abnormality throughout the fifth metatarsal which is consistent of Osteomyelitis. He has seen by Podiatry and wound culture has sent, Zosyn and Vancomycin has started. The ID consult requested to assist with further evaluation and antibiotic management.     # Right Fifth metatarsal DFU with drainage and Osteomyelitis    would recommend:    1. Follow up Wound and Blood cultures  2. Monitor Kidney function and adjust Abx doses accordingly  3. Wound care as per Podiatry  4. Continue Zosyn and IV Vancomycin until work up is done  5. Monitor and Keep Vancomycin level between 15 to 20    will follow the patient with you and make further recommendation based on the clinical course and Lab results  Thank you for the opportunity to participate in Mr. ROBERT's care      Attending Attestation:    Spent more than 65 minutes on total encounter, more than 50 % of the visit was spent counseling and/or coordinating care by the Attending physician.

## 2021-09-17 NOTE — H&P ADULT - HISTORY OF PRESENT ILLNESS
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 Pt with sharp pain on the R lateral foot. As per daughter, pt was taking tylenol which did not help his pain prompting the patient to ask his daughter to take him to the hospital. Pt is a poor historian. Daughter states that the patient did not see his podiatrist after the surgery. No fever, chest, pain, palpitations, nausea, vomiting, diarrhea

## 2021-09-17 NOTE — H&P ADULT - PROBLEM SELECTOR PLAN 1
p/w right foot pain, tender to touch, As per podiatry (+)probe to bone  MRI Form in chart  F/U MRI R foot w/ contrast, r/o osteo  discharged on augmentin on prior admission in august  tylenol prn, percocet prn for pain control  s/p vanc + cefepime in ED  Hold off on abx until mri and may need bone biopsy and culture to increase yield of culture  f/u blood culture, wound culture  ESR elevated, may be chronic osteo  Podiatry following  ID consult Dr Barrett p/w right foot pain, tender to touch, As per podiatry (+)probe to bone  MRI Form in chart  F/U MRI R foot w/ contrast, r/o osteo  discharged on augmentin on prior admission in august  tylenol prn, percocet prn for pain control  s/p vanc + zosyn in ED  C/w vanc q24 + zosyn  obtain vt prior to 3rd dose  f/u blood culture, wound culture  ESR elevated, may be chronic osteo  Podiatry following  ID consult Dr Barrett

## 2021-09-17 NOTE — H&P ADULT - ASSESSMENT
76M from home, lives with daughter w/ PMH DM on insulin, HTN, HLD, CAD, PSH R partial 5th ray amputation 8/19/2021 p/w R foot pain x1 day associated with foul smelling discharge admitted for R/O R foot osteomyelitis

## 2021-09-17 NOTE — CHART NOTE - NSCHARTNOTEFT_GEN_A_CORE
75 y/o male  from home, lives with daughter w/ PMH DM on insulin, HTN, HLD, CAD, PSH right  partial 5th ray amputation 8/19/2021 presented with right foot pain and discharge from surgical site   Patient was admitted for right foot infection ro osteomyelitis     Pt was seen by podiatry team and wound was evaluated. Debridement was performed at bedside. Dressing was applied by podiatry   Started Vancomycin ad Zosyn     Pt seen at bedside, co mild to moderate pain to right foot   No other complaints, no fever or leucocytosis   Pt had MRI of right foot pending results            OBJECTIVE:  Vital Signs Last 24 Hrs  T(C): 36.8 (17 Sep 2021 12:44), Max: 37.3 (17 Sep 2021 00:08)  T(F): 98.2 (17 Sep 2021 12:44), Max: 99.2 (17 Sep 2021 00:08)  HR: 70 (17 Sep 2021 12:44) (70 - 87)  BP: 130/69 (17 Sep 2021 12:44) (130/69 - 173/73)  BP(mean): --  RR: 18 (17 Sep 2021 12:44) (18 - 18)  SpO2: 100% (17 Sep 2021 12:44) (97% - 100%)    FOCUSED PHYSICAL EXAM:  Neuro: awake, alert, oriented x 3. No neuro deficit  Cardiovascular: Pulses +2 left foot, , HR regular, BP stable, No edema.  dressing over right foot unable to assess left foot pulses   Respiratory: Respirations regular, unlabored, breath sounds clear B/L.   GI: Abdomen soft, non-tender, positive bowel sounds.  : no bladder distention noted. No complaints at this time.  Skin: Dry, intact, no bruising, no diaphoresis.        PLAN:       - follow up MRI right foot ro osteomyelitis   - continue Vancomycin and Zosyn  - podiatry on board   - ID Dr Barrett pending eval   - tylenol prn, percocet prn for pain control  - f/u blood culture, wound culture  - continue home medications   - dvt prophylaxis

## 2021-09-17 NOTE — PROGRESS NOTE ADULT - ASSESSMENT
A:   Right Foot Wound     P:  Patient evaluated, chart reviewed  Xrays reviewed  ESR/CRP reviewed   Removed the dressings and evaluated the wood  Dressed with santyl, dsd, ace  Podiatry will follow while in house  Discussed and Seen with Dr. Lara

## 2021-09-17 NOTE — PROGRESS NOTE ADULT - SUBJECTIVE AND OBJECTIVE BOX
Patient is a 76y old  Male who presents with a chief complaint of R Foot Pain (17 Sep 2021 14:32)    PATIENT IS SEEN AND EXAMINED IN MEDICAL FLOOR.  KEENANT [    ]    MADDY [   ]      GT [   ]    ALLERGIES:  No Known Allergies      Daily     Daily     VITALS:    Vital Signs Last 24 Hrs  T(C): 37.1 (17 Sep 2021 16:07), Max: 37.3 (17 Sep 2021 00:08)  T(F): 98.7 (17 Sep 2021 16:07), Max: 99.2 (17 Sep 2021 00:08)  HR: 75 (17 Sep 2021 16:07) (70 - 87)  BP: 146/74 (17 Sep 2021 16:07) (130/69 - 173/73)  BP(mean): --  RR: 17 (17 Sep 2021 16:07) (17 - 18)  SpO2: 100% (17 Sep 2021 16:07) (97% - 100%)    LABS:    CBC Full  -  ( 17 Sep 2021 06:32 )  WBC Count : 5.83 K/uL  RBC Count : 3.72 M/uL  Hemoglobin : 10.7 g/dL  Hematocrit : 33.7 %  Platelet Count - Automated : 171 K/uL  Mean Cell Volume : 90.6 fl  Mean Cell Hemoglobin : 28.8 pg  Mean Cell Hemoglobin Concentration : 31.8 gm/dL  Auto Neutrophil # : 3.94 K/uL  Auto Lymphocyte # : 1.27 K/uL  Auto Monocyte # : 0.35 K/uL  Auto Eosinophil # : 0.23 K/uL  Auto Basophil # : 0.03 K/uL  Auto Neutrophil % : 67.6 %  Auto Lymphocyte % : 21.8 %  Auto Monocyte % : 6.0 %  Auto Eosinophil % : 3.9 %  Auto Basophil % : 0.5 %      09-17    137  |  108  |  16  ----------------------------<  140<H>  4.6   |  22  |  1.44<H>    Ca    9.1      17 Sep 2021 06:32  Phos  3.0     09-17  Mg     1.9     09-17    TPro  7.5  /  Alb  3.2<L>  /  TBili  0.8  /  DBili  x   /  AST  12  /  ALT  20  /  AlkPhos  120  09-17    CAPILLARY BLOOD GLUCOSE      POCT Blood Glucose.: 134 mg/dL (17 Sep 2021 17:11)  POCT Blood Glucose.: 128 mg/dL (17 Sep 2021 11:06)  POCT Blood Glucose.: 157 mg/dL (17 Sep 2021 04:10)        LIVER FUNCTIONS - ( 17 Sep 2021 06:32 )  Alb: 3.2 g/dL / Pro: 7.5 g/dL / ALK PHOS: 120 U/L / ALT: 20 U/L DA / AST: 12 U/L / GGT: x           Creatinine Trend: 1.44<--, 1.70<--, 1.18<--, 1.22<--, 1.41<--, 1.37<--  I&O's Summary          Bone R 5th metatarsal bone clean ma  08-20 @ 03:59   No growth at 5 days  --    No polymorphonuclear leukocytes seen per low power field  No organisms seen per oil power field      .Blood Blood-Venous  08-14 @ 21:09   No Growth Final  --  --      .Surgical Swab Right foot plantar wound  08-14 @ 21:08   Moderate Streptococcus agalactiae (Group B) isolated  Group B streptococci are susceptible to ampicillin,  penicillin and cefazolin, but may be resistant to  erythromycin and clindamycin.  Recommendations for intrapartum prophylaxis for Group B  streptococci are penicillin or ampicillin.  Moderate Bacteroides fragilis "Susceptibilities not performed"  Normal skin filiberto isolated  --  --          MEDICATIONS:    MEDICATIONS  (STANDING):  aspirin  chewable 81 milliGRAM(s) Oral daily  atorvastatin 80 milliGRAM(s) Oral at bedtime  cyanocobalamin 1000 MICROGram(s) Oral daily  ergocalciferol 21387 Unit(s) Oral <User Schedule>  ferrous    sulfate 325 milliGRAM(s) Oral daily  heparin   Injectable 5000 Unit(s) SubCutaneous every 8 hours  insulin glargine Injectable (LANTUS) 8 Unit(s) SubCutaneous at bedtime  insulin lispro (ADMELOG) corrective regimen sliding scale   SubCutaneous Before meals and at bedtime  lisinopril 10 milliGRAM(s) Oral daily  metoprolol succinate  milliGRAM(s) Oral daily  NIFEdipine XL 60 milliGRAM(s) Oral daily  piperacillin/tazobactam IVPB.. 3.375 Gram(s) IV Intermittent every 8 hours  vancomycin  IVPB 1000 milliGRAM(s) IV Intermittent every 24 hours      MEDICATIONS  (PRN):  acetaminophen   Tablet .. 650 milliGRAM(s) Oral every 6 hours PRN Temp greater or equal to 38C (100.4F), Moderate Pain (4 - 6)  oxycodone    5 mG/acetaminophen 325 mG 1 Tablet(s) Oral every 6 hours PRN Severe Pain (7 - 10)      REVIEW OF SYSTEMS:                           ALL ROS DONE [ X   ]    CONSTITUTIONAL:  LETHARGIC [   ], FEVER [   ], UNRESPONSIVE [   ]  CVS:  CP  [   ], SOB, [   ], PALPITATIONS [   ], DIZZYNESS [   ]  RS: COUGH [   ], SPUTUM [   ]  GI: ABDOMINAL PAIN [   ], NAUSEA [   ], VOMITINGS [   ], DIARRHEA [   ], CONSTIPATION [   ]  :  DYSURIA [   ], NOCTURIA [   ], INCREASED FREQUENCY [   ], DRIBLING [   ],  SKELETAL: PAINFUL JOINTS [   ], SWOLLEN JOINTS [   ], NECK ACHE [   ], LOW BACK ACHE [   ],  SKIN : ULCERS [   ], RASH [   ], ITCHING [   ]  CNS: HEAD ACHE [   ], DOUBLE VISION [   ], BLURRED VISION [   ], AMS / CONFUSION [   ], SEIZURES [   ], WEAKNESS [   ],TINGLING / NUMBNESS [   ]    PHYSICAL EXAMINATION:  GENERAL APPEARANCE: NO DISTRESS  HEENT:  NO PALLOR, NO  JVD,  NO   NODES, NECK SUPPLE  CVS: S1 +, S2 +,   RS: AEEB,  OCCASIONAL  RALES +,   NO RONCHI  ABD: SOFT, NT, NO, BS +  EXT: NO PE  SKIN: WARM,   SKELETAL:  ROM ACCEPTABLE  CNS:  AAO X    ,   DEFICITS    RADIOLOGY :      ASSESSMENT :     Headache    HTN (hypertension)    HLD (hyperlipidemia)    DM (diabetes mellitus)    CAD (coronary artery disease)    Glaucoma, angle-closure    No significant past surgical history    S/P CABG (coronary artery bypass graft)    History of thyroid surgery        PLAN:  HPI:  76M from home, lives with daughter w/ PMH DM on insulin, HTN, HLD, CAD, 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 Pt with sharp pain on the R lateral foot. As per daughter, pt was taking tylenol which did not help his pain prompting the patient to ask his daughter to take him to the hospital. Pt is a poor historian. Daughter states that the patient did not see his podiatrist after the surgery. No fever, chest, pain, palpitations, nausea, vomiting, diarrhea (17 Sep 2021 01:11)    -      Patient is a 76y old  Male who presents with a chief complaint of R Foot Pain (17 Sep 2021 14:32)    PATIENT IS SEEN AND EXAMINED IN MEDICAL FLOOR.    ALLERGIES:  No Known Allergies    VITALS:    Vital Signs Last 24 Hrs  T(C): 37.1 (17 Sep 2021 16:07), Max: 37.3 (17 Sep 2021 00:08)  T(F): 98.7 (17 Sep 2021 16:07), Max: 99.2 (17 Sep 2021 00:08)  HR: 75 (17 Sep 2021 16:07) (70 - 87)  BP: 146/74 (17 Sep 2021 16:07) (130/69 - 173/73)  BP(mean): --  RR: 17 (17 Sep 2021 16:07) (17 - 18)  SpO2: 100% (17 Sep 2021 16:07) (97% - 100%)    LABS:    CBC Full  -  ( 17 Sep 2021 06:32 )  WBC Count : 5.83 K/uL  RBC Count : 3.72 M/uL  Hemoglobin : 10.7 g/dL  Hematocrit : 33.7 %  Platelet Count - Automated : 171 K/uL  Mean Cell Volume : 90.6 fl  Mean Cell Hemoglobin : 28.8 pg  Mean Cell Hemoglobin Concentration : 31.8 gm/dL  Auto Neutrophil # : 3.94 K/uL  Auto Lymphocyte # : 1.27 K/uL  Auto Monocyte # : 0.35 K/uL  Auto Eosinophil # : 0.23 K/uL  Auto Basophil # : 0.03 K/uL  Auto Neutrophil % : 67.6 %  Auto Lymphocyte % : 21.8 %  Auto Monocyte % : 6.0 %  Auto Eosinophil % : 3.9 %  Auto Basophil % : 0.5 %      09-17    137  |  108  |  16  ----------------------------<  140<H>  4.6   |  22  |  1.44<H>    Ca    9.1      17 Sep 2021 06:32  Phos  3.0     09-17  Mg     1.9     09-17    TPro  7.5  /  Alb  3.2<L>  /  TBili  0.8  /  DBili  x   /  AST  12  /  ALT  20  /  AlkPhos  120  09-17    CAPILLARY BLOOD GLUCOSE      POCT Blood Glucose.: 134 mg/dL (17 Sep 2021 17:11)  POCT Blood Glucose.: 128 mg/dL (17 Sep 2021 11:06)  POCT Blood Glucose.: 157 mg/dL (17 Sep 2021 04:10)        LIVER FUNCTIONS - ( 17 Sep 2021 06:32 )  Alb: 3.2 g/dL / Pro: 7.5 g/dL / ALK PHOS: 120 U/L / ALT: 20 U/L DA / AST: 12 U/L / GGT: x           Creatinine Trend: 1.44<--, 1.70<--, 1.18<--, 1.22<--, 1.41<--, 1.37<--  I&O's Summary          Bone R 5th metatarsal bone clean ma  08-20 @ 03:59   No growth at 5 days  --    No polymorphonuclear leukocytes seen per low power field  No organisms seen per oil power field      .Blood Blood-Venous  08-14 @ 21:09   No Growth Final  --  --      .Surgical Swab Right foot plantar wound  08-14 @ 21:08   Moderate Streptococcus agalactiae (Group B) isolated  Group B streptococci are susceptible to ampicillin,  penicillin and cefazolin, but may be resistant to  erythromycin and clindamycin.  Recommendations for intrapartum prophylaxis for Group B  streptococci are penicillin or ampicillin.  Moderate Bacteroides fragilis "Susceptibilities not performed"  Normal skin filiberto isolated  --  --          MEDICATIONS:    MEDICATIONS  (STANDING):  aspirin  chewable 81 milliGRAM(s) Oral daily  atorvastatin 80 milliGRAM(s) Oral at bedtime  cyanocobalamin 1000 MICROGram(s) Oral daily  ergocalciferol 82032 Unit(s) Oral <User Schedule>  ferrous    sulfate 325 milliGRAM(s) Oral daily  heparin   Injectable 5000 Unit(s) SubCutaneous every 8 hours  insulin glargine Injectable (LANTUS) 8 Unit(s) SubCutaneous at bedtime  insulin lispro (ADMELOG) corrective regimen sliding scale   SubCutaneous Before meals and at bedtime  lisinopril 10 milliGRAM(s) Oral daily  metoprolol succinate  milliGRAM(s) Oral daily  NIFEdipine XL 60 milliGRAM(s) Oral daily  piperacillin/tazobactam IVPB.. 3.375 Gram(s) IV Intermittent every 8 hours  vancomycin  IVPB 1000 milliGRAM(s) IV Intermittent every 24 hours      MEDICATIONS  (PRN):  acetaminophen   Tablet .. 650 milliGRAM(s) Oral every 6 hours PRN Temp greater or equal to 38C (100.4F), Moderate Pain (4 - 6)  oxycodone    5 mG/acetaminophen 325 mG 1 Tablet(s) Oral every 6 hours PRN Severe Pain (7 - 10)      REVIEW OF SYSTEMS:                           ALL ROS DONE [ X   ]    CONSTITUTIONAL:  LETHARGIC [   ], FEVER [   ], UNRESPONSIVE [   ]  CVS:  CP  [   ], SOB, [   ], PALPITATIONS [   ], DIZZYNESS [   ]  RS: COUGH [   ], SPUTUM [   ]  GI: ABDOMINAL PAIN [   ], NAUSEA [   ], VOMITINGS [   ], DIARRHEA [   ], CONSTIPATION [   ]  :  DYSURIA [   ], NOCTURIA [   ], INCREASED FREQUENCY [   ], DRIBLING [   ],  SKELETAL: PAINFUL JOINTS [   ], SWOLLEN JOINTS [   ], NECK ACHE [   ], LOW BACK ACHE [   ],  SKIN : ULCERS [   ], RASH [   ], ITCHING [   ]  CNS: HEAD ACHE [   ], DOUBLE VISION [   ], BLURRED VISION [   ], AMS / CONFUSION [   ], SEIZURES [   ], WEAKNESS [   ],TINGLING / NUMBNESS [   ]    PHYSICAL EXAMINATION:  GENERAL APPEARANCE: NO DISTRESS  HEENT:  NO PALLOR, NO  JVD,  NO   NODES, NECK SUPPLE  CVS: S1 +, S2 +,   RS: AEEB,  OCCASIONAL  RALES +,   NO RONCHI  ABD: SOFT, NT, NO, BS +  EXT: NO PE  SKIN: WARM,   RIGHT FOOT WRAPPED  SKELETAL:  ROM ACCEPTABLE  CNS:  AAO X  3,   DEFICITS    RADIOLOGY :    EXAM:  MR FOOT RT                            PROCEDURE DATE:  09/17/2021          INTERPRETATION:  Clinical Information: Recent fifth metatarsal head resection now with fifth digit pain, swelling and foul discharge.    Comparison: Radiographs of the right foot from 9/16/2021 and MRI the right foot from 8/16/2021.    Technique:  MRI of the right midfoot and forefoot.  Intravenous Contrast: None.    Findings:    There is a resection at the mid aspect of the fifth metatarsal shaft. There is a lateral soft tissue wound beginning at the level of the amputation which extends distally. There is susceptibility artifact in the region of the amputation consistent with postoperative change. There is hyperintense T2 marrow signal throughout the remaining fifth metatarsal and within the adjacent fourth metatarsal head which is nonspecific and could be related to recent postoperative changes although osteomyelitis is suspected.    There is edema and mild atrophy within the plantar muscles of the foot. There is minimal spurring at the first metatarsophalangeal and hallux sesamoid articulations.    Impression:  Resection at the mid aspect of the fifth metatarsal. Lateral soft tissue wound with marrow signal abnormality throughout the fifth metatarsal and within the adjacent fourth metatarsal head which is nonspecific in the setting of recent surgery although osteomyelitis is suspected.    ASSESSMENT :     Headache    HTN (hypertension)    HLD (hyperlipidemia)    DM (diabetes mellitus)    CAD (coronary artery disease)    Glaucoma, angle-closure    No significant past surgical history    S/P CABG (coronary artery bypass graft)    History of thyroid surgery        PLAN:  HPI:  76M from home, lives with daughter w/ PMH DM on insulin, HTN, HLD, CAD, 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 Pt with sharp pain on the R lateral foot. As per daughter, pt was taking tylenol which did not help his pain prompting the patient to ask his daughter to take him to the hospital. Pt is a poor historian. Daughter states that the patient did not see his podiatrist after the surgery. No fever, chest, pain, palpitations, nausea, vomiting, diarrhea (17 Sep 2021 01:11)    # SUSPECT RIGHT FOOT OSTEOMYELITIS VS. CELLULITIS W/ POST OP-CHANGES   ** RECENT ADMISSION FOR RIGHT DIABETIC FOOT ULCER, CELLULITIS, OSTEOMYELITIS RIGHT 5th METATARSAL S/P RIGHT 5TH PARTIAL RAY AMPUTATION [8/20] - BONE PATHOLOGY W/ CLEAN MARGINS    - ON VANCOMYCIN AND ZOSYN  - RECENTLY HAD SIMON W/ MILD PAD  - F/U WOUND CX AND BCX   - ID CONSULT, PODIATRY CONSULT    # MEDICAL CLEARANCE FOR SURGERY - RCRI - 2 - 10.1 % 30 DAY RISK OF DEATH, MI OR CARDIAC ARREST  - CARDIOLOGY CONSULT     # OREN  ON SUSPECTED CKD - S/P IVF, MONITOR CR, AVOID NEPHROTOXIC AGENTS    # DM -  HBA1C - 11  - LANTUS, SSI + FS    # CAD S/P CABG - PLACED ON ASA, STATIN, BB  - ECHO - SEVERE AORTIC STENOSIS, SEVERE CONCENTRIC LVH, G1DD, MILD IA  - CARDIOLOGY CONSULT - DR. BASSETT    # HTN - ON METOPROLOL, NICARDIPINE    # SEVERE, ASYMPTOMATIC, STENOSIS - ONCE ACUTE ILLNESS RESOLVES, WILL LIKELY NEED ISCHEMIC WORKUP, SABIHA TO EVALUATE FURTHER. D/W PATIENT, DAUGHTER AND CARDIOLOGY TEAM [PREVIOUSLY SAW DR. 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

## 2021-09-17 NOTE — CONSULT NOTE ADULT - SUBJECTIVE AND OBJECTIVE BOX
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  As per daughter, patient was taking tylenol which did not help his pain prompting the patient to ask his daughter to take him to the hospital. ON admission, he has no fever or Leukocytosis. The Xray of Right foot shows no Osteomyelitis but MRI of Right foot shows Lateral soft tissue wound with marrow signal abnormality throughout the fifth metatarsal which is consistent of Osteomyelitis. He has seen by Podiatry and wound culture has sent, Zosyn and Vancomycin has started. The ID consult requested to assist with further evaluation and antibiotic management.       REVIEW OF SYSTEMS: Total of twelve systems have been reviewed and found to be negative unless mentioned in HPI      PAST MEDICAL & SURGICAL HISTORY:  HTN (hypertension)  HLD (hyperlipidemia)  DM (diabetes mellitus)  CAD (coronary artery disease)  Glaucoma, angle-closure  S/P CABG (coronary artery bypass graft)  History of thyroid surgery  Right partial 5th ray amputation 8/19/2021      SOCIAL HISTORY  Alcohol: Does not drink  Tobacco: Does not smoke  Illicit substance use: None      FAMILY HISTORY: Non contributory to the present illness      ALLERGIES: No Known Allergies      Vital Signs Last 24 Hrs  T(C): 37.1 (17 Sep 2021 16:07), Max: 37.3 (17 Sep 2021 00:08)  T(F): 98.7 (17 Sep 2021 16:07), Max: 99.2 (17 Sep 2021 00:08)  HR: 75 (17 Sep 2021 16:07) (70 - 87)  BP: 146/74 (17 Sep 2021 16:07) (130/69 - 173/73)  BP(mean): --  RR: 17 (17 Sep 2021 16:07) (17 - 18)  SpO2: 100% (17 Sep 2021 16:07) (97% - 100%)      PHYSICAL EXAM:  GENERAL: Not in distress   CHEST/LUNG:  Not using accessory muscles  HEART: s1 and s2 present  ABDOMEN:  Nontender and  Nondistended  EXTREMITIES: Right foot bandage in placed   CNS: Awake and Alert      LABS:                        10.7   5.83  )-----------( 171      ( 17 Sep 2021 06:32 )             33.7       09-17    137  |  108  |  16  ----------------------------<  140<H>  4.6   |  22  |  1.44<H>    Ca    9.1      17 Sep 2021 06:32  Phos  3.0     09-17  Mg     1.9     09-17    TPro  7.5  /  Alb  3.2<L>  /  TBili  0.8  /  DBili  x   /  AST  12  /  ALT  20  /  AlkPhos  120  09-17        CAPILLARY BLOOD GLUCOSE  POCT Blood Glucose.: 134 mg/dL (17 Sep 2021 17:11)  POCT Blood Glucose.: 128 mg/dL (17 Sep 2021 11:06)  POCT Blood Glucose.: 157 mg/dL (17 Sep 2021 04:10)      MEDICATIONS  (STANDING):  aspirin  chewable 81 milliGRAM(s) Oral daily  atorvastatin 80 milliGRAM(s) Oral at bedtime  cyanocobalamin 1000 MICROGram(s) Oral daily  ergocalciferol 27661 Unit(s) Oral <User Schedule>  ferrous    sulfate 325 milliGRAM(s) Oral daily  heparin   Injectable 5000 Unit(s) SubCutaneous every 8 hours  influenza   Vaccine 0.5 milliLiter(s) IntraMuscular once  insulin glargine Injectable (LANTUS) 8 Unit(s) SubCutaneous at bedtime  insulin lispro (ADMELOG) corrective regimen sliding scale   SubCutaneous Before meals and at bedtime  lisinopril 10 milliGRAM(s) Oral daily  metoprolol succinate  milliGRAM(s) Oral daily  NIFEdipine XL 60 milliGRAM(s) Oral daily  piperacillin/tazobactam IVPB.. 3.375 Gram(s) IV Intermittent every 8 hours  vancomycin  IVPB 1000 milliGRAM(s) IV Intermittent every 24 hours    MEDICATIONS  (PRN):  acetaminophen   Tablet .. 650 milliGRAM(s) Oral every 6 hours PRN Temp greater or equal to 38C (100.4F), Moderate Pain (4 - 6)  oxycodone    5 mG/acetaminophen 325 mG 1 Tablet(s) Oral every 6 hours PRN Severe Pain (7 - 10)      RADIOLOGY & ADDITIONAL TESTS:    9/17/21 : MR Foot No Cont, Right (09.17.21 @ 13:04) : Resection at the mid aspect of the fifth metatarsal. Lateral soft tissue wound with marrow signal abnormality throughout the fifth metatarsal and within the adjacent fourth metatarsal head which is nonspecific in the setting of recent surgery although osteomyelitis is suspected.    9/16/21 : Xray Foot AP + Lateral + Oblique, Right (09.16.21 @ 15:03) : No acute finding. No plain film evidence of osteomyelitis.        MICROBIOLOGY DATA:    COVID-19 PCR . (09.16.21 @ 22:24)   COVID-19 PCR: NotDetec:

## 2021-09-17 NOTE — H&P ADULT - PROBLEM SELECTOR PLAN 2
A1c 11.4  h/o DM on - lantus 10 at home  c/w lantus 8 + sliding scale  Adjust insulin as indicated  FS ACHS   Maintain poct 140-180 while inpatient

## 2021-09-17 NOTE — PATIENT PROFILE ADULT - NSPROSPHOSPCHAPLAINYN_GEN_A_NUR
ABLATION EDUCATION CHECKLIST    6/8/17  Pre-procedure labs have been ordered for you @ [facility]  Be sure to arrive at your scheduled time for this lab work!  You do not have to fast for this lab work!    6/12/17 @ 10 AM  Report to Cardiology Waiting Room on 3rd floor of the Hospital    (Do not report to clinic)  Directions for Reporting to Cardiology Waiting Area in the Hospital  If you park in the Parking Garage:  Take elevators to the 2nd floor  Walk up ramp and turn right by Gold Elevators  Take elevator to the 3rd floor  Upon exiting the elevator, turn away from the clinic areas  Walk long contreras around to front of hospital to area with windows overlooking [facility]  Check in at Reception Desk  OR  If family is dropping you off:  Have them drop you off at the front of the Hospital  (Near the ER, where all the flags are hung).  Take the E elevators to the 3rd floor.  Check in at the Reception Desk in the waiting room.    Do not eat or drink anything after: 12 mn on the night before your procedure    Medications:   You may take your usual morning medications with a sip of water    You will be spending the night after your procedure  You will need someone to drive you home the day after your procedure.    Your pain during your procedure will be managed by the anesthesia team.     THE ABOVE INSTRUCTIONS WERE GIVEN TO THE PATIENT VERBALLY AND THEY VERBALIZED UNDERSTANDING.  THEY DO NOT REQUIRE ANY SPECIAL NEEDS AND DO NOT HAVE ANY LEARNING BARRIERS.    Any need to reschedule or cancel procedures, or any questions regarding your procedures should be addressed directly with the Arrhythmia Department Nurses at the following phone number: [phone]         no

## 2021-09-18 DIAGNOSIS — N18.30 CHRONIC KIDNEY DISEASE, STAGE 3 UNSPECIFIED: ICD-10-CM

## 2021-09-18 DIAGNOSIS — I25.810 ATHEROSCLEROSIS OF CORONARY ARTERY BYPASS GRAFT(S) WITHOUT ANGINA PECTORIS: ICD-10-CM

## 2021-09-18 DIAGNOSIS — Z02.9 ENCOUNTER FOR ADMINISTRATIVE EXAMINATIONS, UNSPECIFIED: ICD-10-CM

## 2021-09-18 DIAGNOSIS — L03.115 CELLULITIS OF RIGHT LOWER LIMB: ICD-10-CM

## 2021-09-18 DIAGNOSIS — M79.671 PAIN IN RIGHT FOOT: ICD-10-CM

## 2021-09-18 LAB
-  AMIKACIN: SIGNIFICANT CHANGE UP
-  AMIKACIN: SIGNIFICANT CHANGE UP
-  AMOXICILLIN/CLAVULANIC ACID: SIGNIFICANT CHANGE UP
-  AMPICILLIN/SULBACTAM: SIGNIFICANT CHANGE UP
-  AMPICILLIN/SULBACTAM: SIGNIFICANT CHANGE UP
-  AMPICILLIN: SIGNIFICANT CHANGE UP
-  AMPICILLIN: SIGNIFICANT CHANGE UP
-  AZTREONAM: SIGNIFICANT CHANGE UP
-  AZTREONAM: SIGNIFICANT CHANGE UP
-  CEFAZOLIN: SIGNIFICANT CHANGE UP
-  CEFAZOLIN: SIGNIFICANT CHANGE UP
-  CEFEPIME: SIGNIFICANT CHANGE UP
-  CEFEPIME: SIGNIFICANT CHANGE UP
-  CEFOXITIN: SIGNIFICANT CHANGE UP
-  CEFOXITIN: SIGNIFICANT CHANGE UP
-  CEFTAZIDIME: SIGNIFICANT CHANGE UP
-  CEFTRIAXONE: SIGNIFICANT CHANGE UP
-  CEFTRIAXONE: SIGNIFICANT CHANGE UP
-  CIPROFLOXACIN: SIGNIFICANT CHANGE UP
-  CIPROFLOXACIN: SIGNIFICANT CHANGE UP
-  ERTAPENEM: SIGNIFICANT CHANGE UP
-  GENTAMICIN: SIGNIFICANT CHANGE UP
-  GENTAMICIN: SIGNIFICANT CHANGE UP
-  IMIPENEM: SIGNIFICANT CHANGE UP
-  LEVOFLOXACIN: SIGNIFICANT CHANGE UP
-  LEVOFLOXACIN: SIGNIFICANT CHANGE UP
-  MEROPENEM: SIGNIFICANT CHANGE UP
-  MEROPENEM: SIGNIFICANT CHANGE UP
-  PIPERACILLIN/TAZOBACTAM: SIGNIFICANT CHANGE UP
-  PIPERACILLIN/TAZOBACTAM: SIGNIFICANT CHANGE UP
-  TETRACYCLINE: SIGNIFICANT CHANGE UP
-  TOBRAMYCIN: SIGNIFICANT CHANGE UP
-  TRIMETHOPRIM/SULFAMETHOXAZOLE: SIGNIFICANT CHANGE UP
-  TRIMETHOPRIM/SULFAMETHOXAZOLE: SIGNIFICANT CHANGE UP
-  VANCOMYCIN: SIGNIFICANT CHANGE UP
ANION GAP SERPL CALC-SCNC: 5 MMOL/L — SIGNIFICANT CHANGE UP (ref 5–17)
BUN SERPL-MCNC: 14 MG/DL — SIGNIFICANT CHANGE UP (ref 7–18)
CALCIUM SERPL-MCNC: 9 MG/DL — SIGNIFICANT CHANGE UP (ref 8.4–10.5)
CHLORIDE SERPL-SCNC: 106 MMOL/L — SIGNIFICANT CHANGE UP (ref 96–108)
CO2 SERPL-SCNC: 26 MMOL/L — SIGNIFICANT CHANGE UP (ref 22–31)
COVID-19 SPIKE DOMAIN AB INTERP: POSITIVE
COVID-19 SPIKE DOMAIN ANTIBODY RESULT: >250 U/ML — HIGH
CREAT SERPL-MCNC: 1.48 MG/DL — HIGH (ref 0.5–1.3)
CULTURE RESULTS: SIGNIFICANT CHANGE UP
GLUCOSE BLDC GLUCOMTR-MCNC: 142 MG/DL — HIGH (ref 70–99)
GLUCOSE BLDC GLUCOMTR-MCNC: 152 MG/DL — HIGH (ref 70–99)
GLUCOSE BLDC GLUCOMTR-MCNC: 158 MG/DL — HIGH (ref 70–99)
GLUCOSE BLDC GLUCOMTR-MCNC: 162 MG/DL — HIGH (ref 70–99)
GLUCOSE SERPL-MCNC: 117 MG/DL — HIGH (ref 70–99)
HCT VFR BLD CALC: 34.9 % — LOW (ref 39–50)
HGB BLD-MCNC: 11.1 G/DL — LOW (ref 13–17)
MCHC RBC-ENTMCNC: 28.8 PG — SIGNIFICANT CHANGE UP (ref 27–34)
MCHC RBC-ENTMCNC: 31.8 GM/DL — LOW (ref 32–36)
MCV RBC AUTO: 90.6 FL — SIGNIFICANT CHANGE UP (ref 80–100)
METHOD TYPE: SIGNIFICANT CHANGE UP
NRBC # BLD: 0 /100 WBCS — SIGNIFICANT CHANGE UP (ref 0–0)
ORGANISM # SPEC MICROSCOPIC CNT: SIGNIFICANT CHANGE UP
PLATELET # BLD AUTO: 202 K/UL — SIGNIFICANT CHANGE UP (ref 150–400)
POTASSIUM SERPL-MCNC: 4.6 MMOL/L — SIGNIFICANT CHANGE UP (ref 3.5–5.3)
POTASSIUM SERPL-SCNC: 4.6 MMOL/L — SIGNIFICANT CHANGE UP (ref 3.5–5.3)
RBC # BLD: 3.85 M/UL — LOW (ref 4.2–5.8)
RBC # FLD: 13.3 % — SIGNIFICANT CHANGE UP (ref 10.3–14.5)
SARS-COV-2 IGG+IGM SERPL QL IA: >250 U/ML — HIGH
SARS-COV-2 IGG+IGM SERPL QL IA: POSITIVE
SODIUM SERPL-SCNC: 137 MMOL/L — SIGNIFICANT CHANGE UP (ref 135–145)
SPECIMEN SOURCE: SIGNIFICANT CHANGE UP
VANCOMYCIN TROUGH SERPL-MCNC: <0.8 UG/ML — LOW (ref 10–20)
WBC # BLD: 6.94 K/UL — SIGNIFICANT CHANGE UP (ref 3.8–10.5)
WBC # FLD AUTO: 6.94 K/UL — SIGNIFICANT CHANGE UP (ref 3.8–10.5)

## 2021-09-18 PROCEDURE — 99222 1ST HOSP IP/OBS MODERATE 55: CPT

## 2021-09-18 PROCEDURE — 99233 SBSQ HOSP IP/OBS HIGH 50: CPT

## 2021-09-18 RX ORDER — POLYETHYLENE GLYCOL 3350 17 G/17G
17 POWDER, FOR SOLUTION ORAL DAILY
Refills: 0 | Status: DISCONTINUED | OUTPATIENT
Start: 2021-09-18 | End: 2021-09-22

## 2021-09-18 RX ORDER — SENNA PLUS 8.6 MG/1
2 TABLET ORAL AT BEDTIME
Refills: 0 | Status: DISCONTINUED | OUTPATIENT
Start: 2021-09-18 | End: 2021-09-22

## 2021-09-18 RX ORDER — VANCOMYCIN HCL 1 G
1000 VIAL (EA) INTRAVENOUS EVERY 12 HOURS
Refills: 0 | Status: DISCONTINUED | OUTPATIENT
Start: 2021-09-18 | End: 2021-09-18

## 2021-09-18 RX ORDER — LIDOCAINE 4 G/100G
1 CREAM TOPICAL DAILY
Refills: 0 | Status: DISCONTINUED | OUTPATIENT
Start: 2021-09-18 | End: 2021-09-22

## 2021-09-18 RX ORDER — MORPHINE SULFATE 50 MG/1
2 CAPSULE, EXTENDED RELEASE ORAL DAILY
Refills: 0 | Status: DISCONTINUED | OUTPATIENT
Start: 2021-09-18 | End: 2021-09-22

## 2021-09-18 RX ORDER — OXYCODONE HYDROCHLORIDE 5 MG/1
5 TABLET ORAL EVERY 4 HOURS
Refills: 0 | Status: DISCONTINUED | OUTPATIENT
Start: 2021-09-18 | End: 2021-09-22

## 2021-09-18 RX ORDER — ACETAMINOPHEN 500 MG
1000 TABLET ORAL ONCE
Refills: 0 | Status: COMPLETED | OUTPATIENT
Start: 2021-09-18 | End: 2021-09-18

## 2021-09-18 RX ORDER — GABAPENTIN 400 MG/1
100 CAPSULE ORAL EVERY 12 HOURS
Refills: 0 | Status: DISCONTINUED | OUTPATIENT
Start: 2021-09-18 | End: 2021-09-22

## 2021-09-18 RX ORDER — MORPHINE SULFATE 50 MG/1
2 CAPSULE, EXTENDED RELEASE ORAL ONCE
Refills: 0 | Status: DISCONTINUED | OUTPATIENT
Start: 2021-09-18 | End: 2021-09-18

## 2021-09-18 RX ORDER — ACETAMINOPHEN 500 MG
1000 TABLET ORAL EVERY 8 HOURS
Refills: 0 | Status: COMPLETED | OUTPATIENT
Start: 2021-09-18 | End: 2021-09-21

## 2021-09-18 RX ADMIN — OXYCODONE AND ACETAMINOPHEN 1 TABLET(S): 5; 325 TABLET ORAL at 13:00

## 2021-09-18 RX ADMIN — PIPERACILLIN AND TAZOBACTAM 25 GRAM(S): 4; .5 INJECTION, POWDER, LYOPHILIZED, FOR SOLUTION INTRAVENOUS at 05:16

## 2021-09-18 RX ADMIN — Medication 60 MILLIGRAM(S): at 06:34

## 2021-09-18 RX ADMIN — OXYCODONE AND ACETAMINOPHEN 1 TABLET(S): 5; 325 TABLET ORAL at 12:07

## 2021-09-18 RX ADMIN — LISINOPRIL 10 MILLIGRAM(S): 2.5 TABLET ORAL at 06:34

## 2021-09-18 RX ADMIN — HEPARIN SODIUM 5000 UNIT(S): 5000 INJECTION INTRAVENOUS; SUBCUTANEOUS at 22:00

## 2021-09-18 RX ADMIN — MORPHINE SULFATE 2 MILLIGRAM(S): 50 CAPSULE, EXTENDED RELEASE ORAL at 10:20

## 2021-09-18 RX ADMIN — OXYCODONE HYDROCHLORIDE 5 MILLIGRAM(S): 5 TABLET ORAL at 21:59

## 2021-09-18 RX ADMIN — PIPERACILLIN AND TAZOBACTAM 25 GRAM(S): 4; .5 INJECTION, POWDER, LYOPHILIZED, FOR SOLUTION INTRAVENOUS at 21:58

## 2021-09-18 RX ADMIN — Medication 650 MILLIGRAM(S): at 00:08

## 2021-09-18 RX ADMIN — Medication 400 MILLIGRAM(S): at 13:00

## 2021-09-18 RX ADMIN — SENNA PLUS 2 TABLET(S): 8.6 TABLET ORAL at 22:00

## 2021-09-18 RX ADMIN — Medication 1: at 11:59

## 2021-09-18 RX ADMIN — Medication 100 MILLIGRAM(S): at 06:34

## 2021-09-18 RX ADMIN — MORPHINE SULFATE 2 MILLIGRAM(S): 50 CAPSULE, EXTENDED RELEASE ORAL at 09:20

## 2021-09-18 RX ADMIN — HEPARIN SODIUM 5000 UNIT(S): 5000 INJECTION INTRAVENOUS; SUBCUTANEOUS at 13:20

## 2021-09-18 RX ADMIN — Medication 1000 MILLIGRAM(S): at 14:00

## 2021-09-18 RX ADMIN — Medication 1000 MILLIGRAM(S): at 22:00

## 2021-09-18 RX ADMIN — INSULIN GLARGINE 8 UNIT(S): 100 INJECTION, SOLUTION SUBCUTANEOUS at 21:48

## 2021-09-18 RX ADMIN — Medication 1000 MILLIGRAM(S): at 23:00

## 2021-09-18 RX ADMIN — HEPARIN SODIUM 5000 UNIT(S): 5000 INJECTION INTRAVENOUS; SUBCUTANEOUS at 06:34

## 2021-09-18 RX ADMIN — Medication 81 MILLIGRAM(S): at 12:12

## 2021-09-18 RX ADMIN — Medication 325 MILLIGRAM(S): at 12:00

## 2021-09-18 RX ADMIN — PREGABALIN 1000 MICROGRAM(S): 225 CAPSULE ORAL at 12:00

## 2021-09-18 RX ADMIN — PIPERACILLIN AND TAZOBACTAM 25 GRAM(S): 4; .5 INJECTION, POWDER, LYOPHILIZED, FOR SOLUTION INTRAVENOUS at 13:18

## 2021-09-18 RX ADMIN — OXYCODONE HYDROCHLORIDE 5 MILLIGRAM(S): 5 TABLET ORAL at 23:00

## 2021-09-18 RX ADMIN — Medication 1: at 21:50

## 2021-09-18 RX ADMIN — OXYCODONE AND ACETAMINOPHEN 1 TABLET(S): 5; 325 TABLET ORAL at 06:35

## 2021-09-18 RX ADMIN — ATORVASTATIN CALCIUM 80 MILLIGRAM(S): 80 TABLET, FILM COATED ORAL at 21:58

## 2021-09-18 RX ADMIN — OXYCODONE AND ACETAMINOPHEN 1 TABLET(S): 5; 325 TABLET ORAL at 07:35

## 2021-09-18 RX ADMIN — GABAPENTIN 100 MILLIGRAM(S): 400 CAPSULE ORAL at 13:16

## 2021-09-18 NOTE — PROGRESS NOTE ADULT - SUBJECTIVE AND OBJECTIVE BOX
S:    O:  Vital Signs Last 24 Hrs  T(C): 37.1 (18 Sep 2021 13:36), Max: 37.1 (17 Sep 2021 16:07)  T(F): 98.8 (18 Sep 2021 13:36), Max: 98.8 (18 Sep 2021 13:36)  HR: 74 (18 Sep 2021 13:36) (72 - 77)  BP: 116/49 (18 Sep 2021 13:36) (116/49 - 146/74)  BP(mean): 75 (17 Sep 2021 21:58) (75 - 75)  RR: 18 (18 Sep 2021 13:36) (17 - 18)  SpO2: 99% (18 Sep 2021 13:36) (99% - 100%)    GENERAL: NAD, well-developed  HEAD:  Atraumatic, Normocephalic  EYES: EOMI, PERRLA, conjunctiva and sclera clear  NECK: Supple, No JVD  CHEST/LUNG: Clear to auscultation bilaterally; No wheeze  HEART: Regular rate and rhythm; No murmurs, rubs, or gallops  ABDOMEN: Soft, Nontender, Nondistended; Bowel sounds present  EXTREMITIES:  2+ Peripheral Pulses, No clubbing, cyanosis, or edema  PSYCH: AAOx3  NEUROLOGY: non-focal  SKIN: No rashes or lesions    MEDICATIONS  (STANDING):  acetaminophen   Tablet .. 1000 milliGRAM(s) Oral every 8 hours  acetaminophen  IVPB .. 1000 milliGRAM(s) IV Intermittent once  aspirin  chewable 81 milliGRAM(s) Oral daily  atorvastatin 80 milliGRAM(s) Oral at bedtime  cyanocobalamin 1000 MICROGram(s) Oral daily  ergocalciferol 87308 Unit(s) Oral <User Schedule>  ferrous    sulfate 325 milliGRAM(s) Oral daily  gabapentin 100 milliGRAM(s) Oral every 12 hours  heparin   Injectable 5000 Unit(s) SubCutaneous every 8 hours  influenza   Vaccine 0.5 milliLiter(s) IntraMuscular once  insulin glargine Injectable (LANTUS) 8 Unit(s) SubCutaneous at bedtime  insulin lispro (ADMELOG) corrective regimen sliding scale   SubCutaneous Before meals and at bedtime  lisinopril 10 milliGRAM(s) Oral daily  metoprolol succinate  milliGRAM(s) Oral daily  NIFEdipine XL 60 milliGRAM(s) Oral daily  piperacillin/tazobactam IVPB.. 3.375 Gram(s) IV Intermittent every 8 hours  senna 2 Tablet(s) Oral at bedtime  vancomycin  IVPB 1000 milliGRAM(s) IV Intermittent every 12 hours    MEDICATIONS  (PRN):  oxyCODONE    IR 5 milliGRAM(s) Oral every 4 hours PRN Severe Pain (7 - 10)                          11.1   6.94  )-----------( 202      ( 18 Sep 2021 06:15 )             34.9       09-18    137  |  106  |  14  ----------------------------<  117<H>  4.6   |  26  |  1.48<H>    Ca    9.0      18 Sep 2021 06:15  Phos  3.0     09-17  Mg     1.9     09-17    TPro  7.5  /  Alb  3.2<L>  /  TBili  0.8  /  DBili  x   /  AST  12  /  ALT  20  /  AlkPhos  120  09-17   S: Patient report severe pain after bedside debridement with podiatry. Improved after morphine IV given.    O:  Vital Signs Last 24 Hrs  T(C): 37.1 (18 Sep 2021 13:36), Max: 37.1 (17 Sep 2021 16:07)  T(F): 98.8 (18 Sep 2021 13:36), Max: 98.8 (18 Sep 2021 13:36)  HR: 74 (18 Sep 2021 13:36) (72 - 77)  BP: 116/49 (18 Sep 2021 13:36) (116/49 - 146/74)  BP(mean): 75 (17 Sep 2021 21:58) (75 - 75)  RR: 18 (18 Sep 2021 13:36) (17 - 18)  SpO2: 99% (18 Sep 2021 13:36) (99% - 100%)    GENERAL: NAD, well-developed  HEAD:  Atraumatic, Normocephalic  EYES: EOMI, PERRLA, conjunctiva and sclera clear  NECK: Supple, No JVD  CHEST/LUNG: Clear to auscultation bilaterally; No wheeze  HEART: Regular rate and rhythm; systolic murmur, no rubs, or gallops  ABDOMEN: Soft, Nontender, Nondistended; Bowel sounds present  EXTREMITIES:  2+ Peripheral Pulses, Left leg bandaged, dressing c/d/i  PSYCH: AAOx3  NEUROLOGY: non-focal  SKIN: No rashes or lesions    MEDICATIONS  (STANDING):  acetaminophen   Tablet .. 1000 milliGRAM(s) Oral every 8 hours  acetaminophen  IVPB .. 1000 milliGRAM(s) IV Intermittent once  aspirin  chewable 81 milliGRAM(s) Oral daily  atorvastatin 80 milliGRAM(s) Oral at bedtime  cyanocobalamin 1000 MICROGram(s) Oral daily  ergocalciferol 61904 Unit(s) Oral <User Schedule>  ferrous    sulfate 325 milliGRAM(s) Oral daily  gabapentin 100 milliGRAM(s) Oral every 12 hours  heparin   Injectable 5000 Unit(s) SubCutaneous every 8 hours  influenza   Vaccine 0.5 milliLiter(s) IntraMuscular once  insulin glargine Injectable (LANTUS) 8 Unit(s) SubCutaneous at bedtime  insulin lispro (ADMELOG) corrective regimen sliding scale   SubCutaneous Before meals and at bedtime  lisinopril 10 milliGRAM(s) Oral daily  metoprolol succinate  milliGRAM(s) Oral daily  NIFEdipine XL 60 milliGRAM(s) Oral daily  piperacillin/tazobactam IVPB.. 3.375 Gram(s) IV Intermittent every 8 hours  senna 2 Tablet(s) Oral at bedtime  vancomycin  IVPB 1000 milliGRAM(s) IV Intermittent every 12 hours    MEDICATIONS  (PRN):  oxyCODONE    IR 5 milliGRAM(s) Oral every 4 hours PRN Severe Pain (7 - 10)                          11.1   6.94  )-----------( 202      ( 18 Sep 2021 06:15 )             34.9       09-18    137  |  106  |  14  ----------------------------<  117<H>  4.6   |  26  |  1.48<H>    Ca    9.0      18 Sep 2021 06:15  Phos  3.0     09-17  Mg     1.9     09-17    TPro  7.5  /  Alb  3.2<L>  /  TBili  0.8  /  DBili  x   /  AST  12  /  ALT  20  /  AlkPhos  120  09-17

## 2021-09-18 NOTE — PROGRESS NOTE ADULT - SUBJECTIVE AND OBJECTIVE BOX
Podiatry Interval: Pt seen bedside AAOx3. Pt endorses pain to R 5th surgical site when debriding bedside. Wound like some medication before proceeding with bedside debridements. Denies constitutional symptoms. States if consent for anything to please call his daughter Korin telephone number: [phone]. No other pedal complaints.     Podiatry HPI: 76 year old male with a PMHx of DM, HTN, HLD, CAD to ED presents to the ED for a Right Foot s/p 5th foot partial ray resection and fillet toe flap. Patient states that he has sharp localized non-radiating pain to the Right foot 5th metatarsal. States that after his surgery, he did not see a podiatrist and he came into the ED because he saw drainage and was having pain. Denies any constitutional symptoms of N/V/C/F/SOB. Denies any other pedal complaints at this time. Denies calf pain today, bilaterally.    Medications acetaminophen   Tablet .. 1000 milliGRAM(s) Oral every 8 hours  acetaminophen  IVPB .. 1000 milliGRAM(s) IV Intermittent once  aspirin  chewable 81 milliGRAM(s) Oral daily  atorvastatin 80 milliGRAM(s) Oral at bedtime  cyanocobalamin 1000 MICROGram(s) Oral daily  ergocalciferol 02732 Unit(s) Oral <User Schedule>  ferrous    sulfate 325 milliGRAM(s) Oral daily  gabapentin 100 milliGRAM(s) Oral every 12 hours  heparin   Injectable 5000 Unit(s) SubCutaneous every 8 hours  influenza   Vaccine 0.5 milliLiter(s) IntraMuscular once  insulin glargine Injectable (LANTUS) 8 Unit(s) SubCutaneous at bedtime  insulin lispro (ADMELOG) corrective regimen sliding scale   SubCutaneous Before meals and at bedtime  lisinopril 10 milliGRAM(s) Oral daily  metoprolol succinate  milliGRAM(s) Oral daily  NIFEdipine XL 60 milliGRAM(s) Oral daily  oxyCODONE    IR 5 milliGRAM(s) Oral every 4 hours PRN  piperacillin/tazobactam IVPB.. 3.375 Gram(s) IV Intermittent every 8 hours  senna 2 Tablet(s) Oral at bedtime  vancomycin  IVPB 1000 milliGRAM(s) IV Intermittent every 24 hours    FHFamily history of acute myocardial infarction (Father)    Family history of diabetes mellitus (Mother, Sibling)    Family history of hypertension (Mother, Sibling)    Family history of hyperlipidemia (Mother, Sibling)    ,   PMHHTN (hypertension)    HLD (hyperlipidemia)    DM (diabetes mellitus)    CAD (coronary artery disease)    Glaucoma, angle-closure       PSHNo significant past surgical history    S/P CABG (coronary artery bypass graft)    History of thyroid surgery        Labs                          11.1   6.94  )-----------( 202      ( 18 Sep 2021 06:15 )             34.9      09-18    137  |  106  |  14  ----------------------------<  117<H>  4.6   |  26  |  1.48<H>    Ca    9.0      18 Sep 2021 06:15  Phos  3.0     09-17  Mg     1.9     09-17    TPro  7.5  /  Alb  3.2<L>  /  TBili  0.8  /  DBili  x   /  AST  12  /  ALT  20  /  AlkPhos  120  09-17     Vital Signs Last 24 Hrs  T(C): 36.7 (18 Sep 2021 05:10), Max: 37.1 (17 Sep 2021 16:07)  T(F): 98 (18 Sep 2021 05:10), Max: 98.7 (17 Sep 2021 16:07)  HR: 77 (18 Sep 2021 05:10) (72 - 77)  BP: 135/57 (18 Sep 2021 05:10) (127/58 - 146/74)  BP(mean): 75 (17 Sep 2021 21:58) (75 - 75)  RR: 18 (18 Sep 2021 05:10) (17 - 18)  SpO2: 100% (18 Sep 2021 05:10) (99% - 100%)  Sedimentation Rate, Erythrocyte: 77 mm/Hr (09-16-21 @ 16:03)  Sedimentation Rate, Erythrocyte: 91 mm/Hr (08-22-21 @ 15:41)         C-Reactive Protein, Serum: 45 mg/L (09-16-21 @ 23:33)  C-Reactive Protein, Serum: 85 mg/L (08-22-21 @ 21:30)  C-Reactive Protein, Serum: 67 mg/L (08-15-21 @ 11:55)   WBC Count: 6.94 K/uL (09-18-21 @ 06:15)          PHYSICAL EXAM  GEN: SHER ROBERT is a pleasant well-nourished, well developed 76y Male in no acute distress, alert awake, and oriented to person, place and time.   LE Focused:    Vasc:  DP/PT pulses were faintly palpable, bilateral. DP/PT pulses were monophasic on doppler, bilaterally. CFT<3 seconds to all digits.   Derm: Surgical Incision site dehiscence noted to the lateral aspect of the right foot approx (7.5cm x 3.5cm x 2.5cm), tunnels to the proximal aspect to the bone, +PTB, hyperpigmentation surrounding the wound, erythematous when compared to the contralateral side, no drainage. No malodor noted. Fibrogranular wound base noted. No streaking noted.   Neuro: Protective sensation diminished, b/l  MSK: +5/5 muscle strength noted to the pedal compartments. 5th digit amputation on right foot, noted.     Imaging:      < from: Xray Foot AP + Lateral + Oblique, Right (09.16.21 @ 15:03) >  EXAM:  XR FOOT COMP MIN 3 VIEWS RT                            PROCEDURE DATE:  09/16/2021          INTERPRETATION:  Postop, rule out infection.    3 views right foot. Prior 8/20/2021.     Status post resection of the fifth toe and distal fifth metatarsal unchanged in appearance. No focal bone lysis or unusual periosteal reaction to suggest osteomyelitis. No acute fracture or dislocation. The remainder study unchanged. No soft tissue gas.    IMPRESSION: No acute finding. No plain film evidence of osteomyelitis.    --- End of Report ---            LYNDSEY WALLS MD; Attending Radiologist  This document has been electronically signed. Sep 16 2021  4:10PM    < end of copied text >          < from: MR Foot No Cont, Right (09.17.21 @ 13:04) >  EXAM:  MR FOOT RT                            PROCEDURE DATE:  09/17/2021          INTERPRETATION:  Clinical Information: Recent fifth metatarsal head resection now with fifth digit pain, swelling and foul discharge.    Comparison: Radiographs of the right foot from 9/16/2021 and MRI the right foot from 8/16/2021.    Technique:  MRI of the right midfoot and forefoot.  Intravenous Contrast: None.    Findings:    There is a resection at the mid aspect of the fifth metatarsal shaft. There is a lateral soft tissue wound beginning at the level of the amputation which extends distally. There is susceptibility artifact in the region of the amputation consistent with postoperative change. There is hyperintense T2 marrow signal throughout the remaining fifth metatarsal and within the adjacent fourth metatarsal head which is nonspecific and could be related to recent postoperative changes although osteomyelitis is suspected.    There is edema and mild atrophy within the plantar muscles of the foot. There is minimal spurring at the first metatarsophalangeal and hallux sesamoid articulations.    Impression:  Resection at the mid aspect of the fifth metatarsal. Lateral soft tissue wound with marrow signal abnormality throughout the fifth metatarsal and within the adjacent fourth metatarsal head which is nonspecific in the setting of recent surgery although osteomyelitis is suspected.    --- End of Report ---        < end of copied text >      Cultures:     Culture Results:   Rare Aeromonas hydrophila/caviae   Few Klebsiella oxytoca/Raoutella ornithinolytica   Few Enterococcus faecalis   Few Streptococcus agalactiae (Group B) isolated   Group B streptococci are susceptible to ampicillin,   penicillin and cefazolin, but may be resistant to   erythromycin and clindamycin.   Recommendations for intrapartum prophylaxis for Group B   streptococci are penicillin or ampicillin. (09.16.21 @ 21:42)

## 2021-09-18 NOTE — PROGRESS NOTE ADULT - PROBLEM SELECTOR PLAN 7
PT consulted, recommend STEVAN  daughter would like discharge to [facility] as wife is also admitted there

## 2021-09-18 NOTE — CHART NOTE - NSCHARTNOTEFT_GEN_A_CORE
EVENT: Right foot pain after bedside debridement    SUBJECTIVE: Patient seen at the [facility] . Crying in pain - Reports " Too much pain after they clean my foot"    OBJECTIVE:  Vital Signs Last 24 Hrs  T(C): 36.7 (18 Sep 2021 05:10), Max: 37.1 (17 Sep 2021 16:07)  T(F): 98 (18 Sep 2021 05:10), Max: 98.7 (17 Sep 2021 16:07)  HR: 77 (18 Sep 2021 05:10) (70 - 77)  BP: 135/57 (18 Sep 2021 05:10) (127/58 - 146/74)  BP(mean): 75 (17 Sep 2021 21:58) (75 - 75)  RR: 18 (18 Sep 2021 05:10) (17 - 18)  SpO2: 100% (18 Sep 2021 05:10) (99% - 100%)    LABS:                        11.1   6.94  )-----------( 202      ( 18 Sep 2021 06:15 )             34.9     09-18    137  |  106  |  14  ----------------------------<  117<H>  4.6   |  26  |  1.48<H>    Ca    9.0      18 Sep 2021 06:15  Phos  3.0     09-17  Mg     1.9     09-17    TPro  7.5  /  Alb  3.2<L>  /  TBili  0.8  /  DBili  x   /  AST  12  /  ALT  20  /  AlkPhos  120  09-17      EKG:   IMGAGING:    ASSESSMENT:  HPI:  76M from home, lives with daughter w/ PMH DM on insulin, HTN, HLD, CAD, 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 Pt with sharp pain on the R lateral foot. As per daughter, pt was taking tylenol which did not help his pain prompting the patient to ask his daughter to take him to the hospital. Pt is a poor historian. Daughter states that the patient did not see his podiatrist after the surgery. No fever, chest, pain, palpitations, nausea, vomiting, diarrhea (17 Sep 2021 01:11)      PLAN:   Pain reporting pain 10/10  Will give Morphine 2 mg IVP X 1   Reassess pain  Will follow EVENT: Right foot pain after bedside debridement    SUBJECTIVE: Patient seen at the [facility] . Crying in pain - Reports " Too much pain after they clean my foot"    OBJECTIVE:  Vital Signs Last 24 Hrs  T(C): 36.7 (18 Sep 2021 05:10), Max: 37.1 (17 Sep 2021 16:07)  T(F): 98 (18 Sep 2021 05:10), Max: 98.7 (17 Sep 2021 16:07)  HR: 77 (18 Sep 2021 05:10) (70 - 77)  BP: 135/57 (18 Sep 2021 05:10) (127/58 - 146/74)  BP(mean): 75 (17 Sep 2021 21:58) (75 - 75)  RR: 18 (18 Sep 2021 05:10) (17 - 18)  SpO2: 100% (18 Sep 2021 05:10) (99% - 100%)    LABS:                        11.1   6.94  )-----------( 202      ( 18 Sep 2021 06:15 )             34.9     09-18    137  |  106  |  14  ----------------------------<  117<H>  4.6   |  26  |  1.48<H>    Ca    9.0      18 Sep 2021 06:15  Phos  3.0     09-17  Mg     1.9     09-17    TPro  7.5  /  Alb  3.2<L>  /  TBili  0.8  /  DBili  x   /  AST  12  /  ALT  20  /  AlkPhos  120  09-17    PHYSICAL EXAM  Neuro: Awake , alert oriented X 3, speech clear , follows commands  Cardiovascular: S1 S2 heard, no murmurs noted  + left foot pedal pulses , right foot ace dressing over right foot   Respiratory: Respirations regular, unlabored, breath sounds clear B/L.       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 Pt with sharp pain on the R lateral foot. As per daughter, pt was taking tylenol which did not help his pain prompting the patient to ask his daughter to take him to the hospital. Pt is a poor historian. Daughter states that the patient did not see his podiatrist after the surgery. No fever, chest, pain, palpitations, nausea, vomiting, diarrhea (17 Sep 2021 01:11)      PLAN:   Pain reporting pain 10/10  Will give Morphine 2 mg IVP X 1   Reassess pain  Will follow

## 2021-09-18 NOTE — PROGRESS NOTE ADULT - ASSESSMENT
A:   Right 5th ray wound dehiscence  s/p R 5th ray resection - 8/19     P:  Patient evaluated, chart reviewed  Xrays reviewed  ESR/CRP reviewed   MRI reviewed   Removed the dressings and evaluated the wound  Obtained written consent for serial debridements bedside   Using sterile #15 blade, debridement of all nonviable tissue down to and including subcutaneous tissue was performed   Pt tolerated procedure well  Possible OR wound debridement with wound vac application next week 9/20   Requesting medical optimization   Consult ID   Dressed with kym, teresa, ace  Podiatry will follow while in house  Discussed with  and seen bedside with Dr. Lara

## 2021-09-18 NOTE — PHYSICAL THERAPY INITIAL EVALUATION ADULT - IMPAIRED TRANSFERS: BED/CHAIR, REHAB EVAL
difficulty maintaining NWB prec of RLE despite constant cues/impaired balance/pain/decreased strength

## 2021-09-18 NOTE — CONSULT NOTE ADULT - SUBJECTIVE AND OBJECTIVE BOX
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 Pt with sharp pain on the R lateral foot. As per daughter, pt was taking tylenol which did not help his pain prompting the patient to ask his daughter to take him to the hospital. Pt is a poor historian. Daughter states that the patient did not see his podiatrist after the surgery. No fever, chest, pain, palpitations, nausea, vomiting, diarrhea (17 Sep 2021 01:11)    Pt with history of DM admitted with right foot pain and drainage. Pt s/p right foot 5th toe ray partial amputation.  Pt now with wound dehiscence.  Pt for OR next week.  + severe pain wong after dressing changes.     PAIN SCORE: 7/10      SCALE USED: (1-10 VNRS)    PAST MEDICAL & SURGICAL HISTORY:  HTN (hypertension)    HLD (hyperlipidemia)    DM (diabetes mellitus)    CAD (coronary artery disease)    Glaucoma, angle-closure    S/P CABG (coronary artery bypass graft)    History of thyroid surgery        FAMILY HISTORY:  Family history of acute myocardial infarction (Father)    Family history of diabetes mellitus (Mother, Sibling)    Family history of hypertension (Mother, Sibling)    Family history of hyperlipidemia (Mother, Sibling)          SOCIAL HISTORY:  [x ] Denies Smoking, Alcohol, or Drug Use    Allergies    No Known Allergies    Intolerances        MEDICATIONS:    MEDICATIONS  (STANDING):  acetaminophen   Tablet .. 1000 milliGRAM(s) Oral every 8 hours  acetaminophen  IVPB .. 1000 milliGRAM(s) IV Intermittent once  aspirin  chewable 81 milliGRAM(s) Oral daily  atorvastatin 80 milliGRAM(s) Oral at bedtime  cyanocobalamin 1000 MICROGram(s) Oral daily  ergocalciferol 22791 Unit(s) Oral <User Schedule>  ferrous    sulfate 325 milliGRAM(s) Oral daily  gabapentin 100 milliGRAM(s) Oral every 12 hours  heparin   Injectable 5000 Unit(s) SubCutaneous every 8 hours  influenza   Vaccine 0.5 milliLiter(s) IntraMuscular once  insulin glargine Injectable (LANTUS) 8 Unit(s) SubCutaneous at bedtime  insulin lispro (ADMELOG) corrective regimen sliding scale   SubCutaneous Before meals and at bedtime  lisinopril 10 milliGRAM(s) Oral daily  metoprolol succinate  milliGRAM(s) Oral daily  NIFEdipine XL 60 milliGRAM(s) Oral daily  piperacillin/tazobactam IVPB.. 3.375 Gram(s) IV Intermittent every 8 hours  senna 2 Tablet(s) Oral at bedtime  vancomycin  IVPB 1000 milliGRAM(s) IV Intermittent every 24 hours    MEDICATIONS  (PRN):  oxyCODONE    IR 5 milliGRAM(s) Oral every 4 hours PRN Severe Pain (7 - 10)      Vital Signs Last 24 Hrs  T(C): 37.1 (18 Sep 2021 13:36), Max: 37.1 (17 Sep 2021 16:07)  T(F): 98.8 (18 Sep 2021 13:36), Max: 98.8 (18 Sep 2021 13:36)  HR: 74 (18 Sep 2021 13:36) (72 - 77)  BP: 116/49 (18 Sep 2021 13:36) (116/49 - 146/74)  BP(mean): 75 (17 Sep 2021 21:58) (75 - 75)  RR: 18 (18 Sep 2021 13:36) (17 - 18)  SpO2: 99% (18 Sep 2021 13:36) (99% - 100%)    LABS:                          11.1   6.94  )-----------( 202      ( 18 Sep 2021 06:15 )             34.9     09-18    137  |  106  |  14  ----------------------------<  117<H>  4.6   |  26  |  1.48<H>    Ca    9.0      18 Sep 2021 06:15  Phos  3.0     09-17  Mg     1.9     09-17    TPro  7.5  /  Alb  3.2<L>  /  TBili  0.8  /  DBili  x   /  AST  12  /  ALT  20  /  AlkPhos  120  09-17      LIVER FUNCTIONS - ( 17 Sep 2021 06:32 )  Alb: 3.2 g/dL / Pro: 7.5 g/dL / ALK PHOS: 120 U/L / ALT: 20 U/L DA / AST: 12 U/L / GGT: x             CAPILLARY BLOOD GLUCOSE      POCT Blood Glucose.: 162 mg/dL (18 Sep 2021 11:30)  POCT Blood Glucose.: 142 mg/dL (18 Sep 2021 07:48)  POCT Blood Glucose.: 156 mg/dL (17 Sep 2021 22:03)  POCT Blood Glucose.: 134 mg/dL (17 Sep 2021 17:11)      REVIEW OF SYSTEMS:  CONSTITUTIONAL: No fever or fatigue  RESPIRATORY: No cough, wheezing, chills or hemoptysis; No shortness of breath  CARDIOVASCULAR: No chest pain, palpitations, dizziness, or leg swelling  GASTROINTESTINAL: No abdominal or epigastric pain. No nausea, vomiting; No diarrhea or constipation.   GENITOURINARY: No dysuria, frequency, hematuria, retention or incontinence  MUSCULOSKELETAL: + right foot pain  NEURO: No weakness, no numbness   PSYCHIATRIC: No depression, anxiety, mood swings, or difficulty sleeping    PHYSICAL EXAM:  GENERAL:  Alert & Oriented X3, NAD, Good concentration  CHEST/LUNG: Clear to auscultation bilaterally; No rales, rhonchi, wheezing, or rubs  HEART: Regular rate and rhythm; No murmurs, rubs, or gallops  ABDOMEN: Soft, Nontender, Nondistended; Bowel sounds present  : no incontinence, no flank pain  EXTREMITIES:  2+ Peripheral Pulses, No cyanosis, or edema  MUSCULOSKELETAL: + right foot drainage + right foot tenderness   NEUROLOGICAL: awake and alert and oriented   SKIN: No rashes or lesions    Radiology:  < from: MR Foot No Cont, Right (09.17.21 @ 13:04) >    EXAM:  MR FOOT RT                            PROCEDURE DATE:  09/17/2021          INTERPRETATION:  Clinical Information: Recent fifth metatarsal head resection now with fifth digit pain, swelling and foul discharge.    Comparison: Radiographs of the right foot from 9/16/2021 and MRI the right foot from 8/16/2021.    Technique:  MRI of the right midfoot and forefoot.  Intravenous Contrast: None.    Findings:    There is a resection at the mid aspect of the fifth metatarsal shaft. There is a lateral soft tissue wound beginning at the level of the amputation which extends distally. There is susceptibility artifact in the region of the amputation consistent with postoperative change. There is hyperintense T2 marrow signal throughout the remaining fifth metatarsal and within the adjacent fourth metatarsal head which is nonspecific and could be related to recent postoperative changes although osteomyelitis is suspected.    There is edema and mild atrophy within the plantar muscles of the foot. There is minimal spurring at the first metatarsophalangeal and hallux sesamoid articulations.    Impression:  Resection at the mid aspect of the fifth metatarsal. Lateral soft tissue wound with marrow signal abnormality throughout the fifth metatarsal and within the adjacent fourth metatarsal head which is nonspecific in the setting of recent surgery although osteomyelitis is suspected.    < end of copied text >    Drug Screen:  aspirin  chewable 81 milliGRAM(s) Oral daily  heparin   Injectable 5000 Unit(s) SubCutaneous every 8 hours    piperacillin/tazobactam IVPB.. 3.375 Gram(s) IV Intermittent every 8 hours  vancomycin  IVPB 1000 milliGRAM(s) IV Intermittent every 24 hours        ORT Score   Family Hx of substance abuse	Female	Male  Alcohol 	                                              1              3  Illegal drugs	                                      2              3  Rx drugs                                               4	      4    Personal Hx of substance abuse		  Alcohol 	                                               3	      3  Illegal drugs                                  	       4	      4  Rx drugs                                                5	      5  Age between 16- 45 years	               1             1  hx preadolescent sexual abuse	       3	      0  Psychological disease		  ADD, OCD, bipolar, schizophrenia	2	      2  Depression                                    	1	      1  Score totals 		  		0  a score of 3 or lower indicates low risk for opioid abuse		  a score of 4-7 indicates moderate risk for opioid abuse		  a score of 8 or higher indicates high risk for opioid abuse	    Risk factors associated with adverse outcomes related to opioid treatment  [ ]  Concurrent benzodiazepine use  [ ]  History/ Active substance use or alcohol use disorder  [ ] Psychiatric co-morbidity  [ ] Sleep apnea  [ ] COPD  [ ] BMI> 35  [ ] Liver dysfunction  [ ] Renal dysfunction  [ ] CHF  [ ] Smoker  x  Age > 60 years      [ x]  [place]  Reviewed and Copied to Chart. See below.

## 2021-09-18 NOTE — PROGRESS NOTE ADULT - ASSESSMENT
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 Pt with sharp pain on the R lateral foot. As per daughter, pt was taking tylenol which did not help his pain prompting the patient to ask his daughter to take him to the hospital. Pt is a poor historian. Daughter states that the patient did not see his podiatrist after the surgery. No fever, chest, pain, palpitations, nausea, vomiting, diarrhea .

## 2021-09-18 NOTE — PROGRESS NOTE ADULT - SUBJECTIVE AND OBJECTIVE BOX
Patient is seen and examined at the bed side, is afebrile. The Blood cultures have no growth to date and wound culture grew Enterococcus, Aeromonas and Klebsiella       REVIEW OF SYSTEMS: All other review systems are negative      ALLERGIES: No Known Allergies      Vital Signs Last 24 Hrs  T(C): 37.1 (18 Sep 2021 13:36), Max: 37.1 (18 Sep 2021 13:36)  T(F): 98.8 (18 Sep 2021 13:36), Max: 98.8 (18 Sep 2021 13:36)  HR: 74 (18 Sep 2021 13:36) (72 - 77)  BP: 116/49 (18 Sep 2021 13:36) (116/49 - 137/55)  BP(mean): 75 (17 Sep 2021 21:58) (75 - 75)  RR: 18 (18 Sep 2021 13:36) (17 - 18)  SpO2: 99% (18 Sep 2021 13:36) (99% - 100%)      PHYSICAL EXAM:  GENERAL: Not in distress   CHEST/LUNG:  Not using accessory muscles  HEART: s1 and s2 present  ABDOMEN:  Nontender and  Nondistended  EXTREMITIES: Right foot bandage in placed   CNS: Awake and Alert      LABS:                          11.1   6.94  )-----------( 202      ( 18 Sep 2021 06:15 )             34.9                           10.7   5.83  )-----------( 171      ( 17 Sep 2021 06:32 )             33.7       09-18    137  |  106  |  14  ----------------------------<  117<H>  4.6   |  26  |  1.48<H>    Ca    9.0      18 Sep 2021 06:15  Phos  3.0     09-17  Mg     1.9     09-17    TPro  7.5  /  Alb  3.2<L>  /  TBili  0.8  /  DBili  x   /  AST  12  /  ALT  20  /  AlkPhos  120  09-17    09-17    137  |  108  |  16  ----------------------------<  140<H>  4.6   |  22  |  1.44<H>    Ca    9.1      17 Sep 2021 06:32  Phos  3.0     09-17  Mg     1.9     09-17    TPro  7.5  /  Alb  3.2<L>  /  TBili  0.8  /  DBili  x   /  AST  12  /  ALT  20  /  AlkPhos  120  09-17        CAPILLARY BLOOD GLUCOSE  POCT Blood Glucose.: 134 mg/dL (17 Sep 2021 17:11)  POCT Blood Glucose.: 128 mg/dL (17 Sep 2021 11:06)  POCT Blood Glucose.: 157 mg/dL (17 Sep 2021 04:10)      MEDICATIONS  (STANDING):    acetaminophen   Tablet .. 1000 milliGRAM(s) Oral every 8 hours  acetaminophen  IVPB .. 1000 milliGRAM(s) IV Intermittent once  aspirin  chewable 81 milliGRAM(s) Oral daily  atorvastatin 80 milliGRAM(s) Oral at bedtime  cyanocobalamin 1000 MICROGram(s) Oral daily  ergocalciferol 59301 Unit(s) Oral <User Schedule>  ferrous    sulfate 325 milliGRAM(s) Oral daily  gabapentin 100 milliGRAM(s) Oral every 12 hours  heparin   Injectable 5000 Unit(s) SubCutaneous every 8 hours  influenza   Vaccine 0.5 milliLiter(s) IntraMuscular once  insulin glargine Injectable (LANTUS) 8 Unit(s) SubCutaneous at bedtime  insulin lispro (ADMELOG) corrective regimen sliding scale   SubCutaneous Before meals and at bedtime  lisinopril 10 milliGRAM(s) Oral daily  metoprolol succinate  milliGRAM(s) Oral daily  NIFEdipine XL 60 milliGRAM(s) Oral daily  piperacillin/tazobactam IVPB.. 3.375 Gram(s) IV Intermittent every 8 hours  senna 2 Tablet(s) Oral at bedtime  vancomycin  IVPB 1000 milliGRAM(s) IV Intermittent every 12 hours      RADIOLOGY & ADDITIONAL TESTS:    9/17/21 : MR Foot No Cont, Right (09.17.21 @ 13:04) : Resection at the mid aspect of the fifth metatarsal. Lateral soft tissue wound with marrow signal abnormality throughout the fifth metatarsal and within the adjacent fourth metatarsal head which is nonspecific in the setting of recent surgery although osteomyelitis is suspected.    9/16/21 : Xray Foot AP + Lateral + Oblique, Right (09.16.21 @ 15:03) : No acute finding. No plain film evidence of osteomyelitis.        MICROBIOLOGY DATA:    COVID-19 David Domain Antibody (09.18.21 @ 12:55)   COVID-19 David Domain Antibody Result: >250.00:    Culture - Blood (09.17.21 @ 10:28)   Specimen Source: .Blood Blood-Peripheral   Culture Results:   No growth to date.   Culture - Blood (09.17.21 @ 10:28)   Specimen Source: .Blood Blood-Venous   Culture Results:   No growth to date. Culture - Surgical Swab (09.16.21 @ 21:42)   - Amikacin: S <=16   - Amikacin: S <=16   - Amoxicillin/Clavulanic Acid: S <=8/4   - Ampicillin: R >16 These ampicillin results predict results for amoxicillin   - Ampicillin: S <=2 Predicts results to ampicillin/sulbactam, amoxacillin-clavulanate and piperacillin-tazobactam.   - Ampicillin/Sulbactam: S 8/4 Enterobacter, Citrobacter, and Serratia may develop resistance during prolonged therapy (3-4 days)   - Ampicillin/Sulbactam: R >16/8   - Aztreonam: S <=4   - Aztreonam: S <=4   - Cefazolin: R 16 Enterobacter, Citrobacter, and Serratia may develop resistance during prolonged therapy (3-4 days)   - Cefazolin: R >16   - Cefepime: S <=2   - Cefepime: S <=2   - Cefoxitin: S <=8   - Cefoxitin: S <=8   - Ceftazidime: S <=1   - Ceftriaxone: S <=1   - Ceftriaxone: S <=1 Enterobacter, Citrobacter, and Serratia may develop resistance during prolonged therapy   - Ciprofloxacin: S <=0.25   - Ciprofloxacin: S <=0.25   - Ertapenem: S <=0.5   - Gentamicin: S <=2   - Gentamicin: S <=2   - Imipenem: S <=1   - Levofloxacin: S <=0.5   - Levofloxacin: S <=0.5   - Meropenem: S <=1   - Meropenem: S <=1   - Piperacillin/Tazobactam: S <=8   - Piperacillin/Tazobactam: S <=8   - Tetra/Doxy: S 4   - Tobramycin: S <=2   - Trimethoprim/Sulfamethoxazole: S <=0.5/9.5   - Trimethoprim/Sulfamethoxazole: S <=0.5/9.5   - Vancomycin: S 2   Specimen Source: .Surgical Swab right foot wound   Culture Results:   Culture yields >4 types of aerobic and/or anaerobic bacteria   Call client services within 7 days if further workup is clinically   indicated. Culture includes   Rare Aeromonas hydrophila/caviae   Few Klebsiella oxytoca/Raoutella ornithinolytica   Few Enterococcus faecalis   Few Streptococcus agalactiae (Group B) isolated   Group B streptococci are susceptible to ampicillin,   penicillin and cefazolin, but may be resistant to   erythromycin and clindamycin.   Recommendations for intrapartum prophylaxis for Group B   streptococci are penicillin or ampicillin.   Organism Identification: Aeromonas hydrophila/caviae   Klebsiella oxytoca /Raoutella ornithinolytica   Enterococcus faecalis   Organism: Aeromonas hydrophila/caviae   Organism: Klebsiella oxytoca /Raoutella ornithinolytica   Organism: Enterococcus faecalis   COVID-19 PCR . (09.16.21 @ 22:24)   COVID-19 PCR: NotDetec:

## 2021-09-18 NOTE — PROGRESS NOTE ADULT - PROBLEM SELECTOR PLAN 1
- on vancomycin and zosyn, check Vanc trough prior to 4th dose  - MRI suspicious for osteomyelitis, but may be due to recent surgical changes  - f/u surgical wound cultures  - ID following, appreciate recommendations  - tentative plan for OR next week with podiatry - on vancomycin and zosyn, check Vanc trough prior to 4th dose  - MRI suspicious for osteomyelitis, but may be due to recent surgical changes  - f/u surgical wound cultures  - ID following, appreciate recommendations  - tentative plan for OR next week with podiatry  - pain management following, appreciate recommendations

## 2021-09-18 NOTE — PROGRESS NOTE ADULT - ASSESSMENT
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  As per daughter, patient was taking tylenol which did not help his pain prompting the patient to ask his daughter to take him to the hospital. ON admission, he has no fever or Leukocytosis. The Xray of Right foot shows no Osteomyelitis but MRI of Right foot shows Lateral soft tissue wound with marrow signal abnormality throughout the fifth metatarsal which is consistent of Osteomyelitis. He has seen by Podiatry and wound culture has sent, Zosyn and Vancomycin has started. The ID consult requested to assist with further evaluation and antibiotic management.     # Right Fifth metatarsal DFU with drainage and Osteomyelitis- wound cx grew Enterococcus, Aeromonas and Klebsiella     would recommend:    1. Discontinue Vancomycin since no evidence of MRSA in culture  2. Monitor Kidney function and adjust Abx doses accordingly  3. Wound care as per Podiatry  4. Continue Zosyn to cover All organisms   5. OOB to chair    d/w Covering NP< patient and his daughter at the bed side    Attending Attestation:    Spent more than 45 minutes on total encounter, more than 50 % of the visit was spent counseling and/or coordinating care by the Attending physician.   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  As per daughter, patient was taking tylenol which did not help his pain prompting the patient to ask his daughter to take him to the hospital. ON admission, he has no fever or Leukocytosis. The Xray of Right foot shows no Osteomyelitis but MRI of Right foot shows Lateral soft tissue wound with marrow signal abnormality throughout the fifth metatarsal which is consistent of Osteomyelitis. He has seen by Podiatry and wound culture has sent, Zosyn and Vancomycin has started. The ID consult requested to assist with further evaluation and antibiotic management.     # Right Fifth metatarsal DFU with drainage and Osteomyelitis- wound cx grew Enterococcus, Aeromonas and Klebsiella     would recommend:    1. Discontinue Vancomycin since no evidence of MRSA in culture  2. Monitor Kidney function and adjust Abx doses accordingly  3. Wound care as per Podiatry  4. Continue Zosyn to cover All organisms   5. OOB to chair    d/w Covering NP, patient and his daughter at the bed side    Attending Attestation:    Spent more than 45 minutes on total encounter, more than 50 % of the visit was spent counseling and/or coordinating care by the Attending physician.

## 2021-09-18 NOTE — PHYSICAL THERAPY INITIAL EVALUATION ADULT - GAIT DEVIATIONS NOTED, PT EVAL
inability to maintain NWB restriction on (R) LE/decreased heather/increased time in double stance/decreased step length

## 2021-09-18 NOTE — CONSULT NOTE ADULT - PROBLEM SELECTOR RECOMMENDATION 9
Pt with right foot pain due to resection at the mid aspect of the fifth metatarsal. Lateral soft tissue wound with marrow signal abnormality throughout the fifth metatarsal and within the adjacent fourth metatarsal head. Pt for OR next and wound vac application  nonopioid recc  - acetaminophen 1 gram ivpb x1 now  - acetaminophen 1 gram po q 8 hours for 3 days  - gabapentin 100mg po q 12 hours - titrate upwards  - no nsaids - pt with suzanna  opioid recc  - can give morphine iv 2mg prior to dressing changes  - oxycodone 5mg po q 4 hours prn  bowel regimen  - senna and miralax  podiatry followup

## 2021-09-18 NOTE — CONSULT NOTE ADULT - ASSESSMENT
Calculator page.   This informational tool is a resource and is not meant for direct clinical application.   Patient Search   Multi-Patient Search   MME Calculator   Reports   Drug List   Designation   My CORY #  Data Detail Level: Printer-Friendly View Extended View  Confidential Drug Utilization Report  Search Terms: alfred villagran, 1945Search Date: 09/18/2021 13:46:54 PM  The Drug Utilization Report below displays all of the controlled substance prescriptions, if any, that your patient has filled in the last twelve months. The information displayed on this report is compiled from pharmacy submissions to the Department, and accurately reflects the information as submitted by the pharmacies.    This report was requested by: Minh Slater | Reference #: [id number]    There are no results for the search terms that you entered.

## 2021-09-19 DIAGNOSIS — N17.9 ACUTE KIDNEY FAILURE, UNSPECIFIED: ICD-10-CM

## 2021-09-19 DIAGNOSIS — I35.0 NONRHEUMATIC AORTIC (VALVE) STENOSIS: ICD-10-CM

## 2021-09-19 DIAGNOSIS — Z01.818 ENCOUNTER FOR OTHER PREPROCEDURAL EXAMINATION: ICD-10-CM

## 2021-09-19 LAB
ALBUMIN SERPL ELPH-MCNC: 2.7 G/DL — LOW (ref 3.5–5)
ALP SERPL-CCNC: 97 U/L — SIGNIFICANT CHANGE UP (ref 40–120)
ALT FLD-CCNC: 13 U/L DA — SIGNIFICANT CHANGE UP (ref 10–60)
ANION GAP SERPL CALC-SCNC: 8 MMOL/L — SIGNIFICANT CHANGE UP (ref 5–17)
AST SERPL-CCNC: 10 U/L — SIGNIFICANT CHANGE UP (ref 10–40)
BASOPHILS # BLD AUTO: 0.03 K/UL — SIGNIFICANT CHANGE UP (ref 0–0.2)
BASOPHILS NFR BLD AUTO: 0.5 % — SIGNIFICANT CHANGE UP (ref 0–2)
BILIRUB SERPL-MCNC: 0.6 MG/DL — SIGNIFICANT CHANGE UP (ref 0.2–1.2)
BUN SERPL-MCNC: 17 MG/DL — SIGNIFICANT CHANGE UP (ref 7–18)
CALCIUM SERPL-MCNC: 9.1 MG/DL — SIGNIFICANT CHANGE UP (ref 8.4–10.5)
CHLORIDE SERPL-SCNC: 107 MMOL/L — SIGNIFICANT CHANGE UP (ref 96–108)
CO2 SERPL-SCNC: 22 MMOL/L — SIGNIFICANT CHANGE UP (ref 22–31)
CREAT SERPL-MCNC: 1.85 MG/DL — HIGH (ref 0.5–1.3)
EOSINOPHIL # BLD AUTO: 0.3 K/UL — SIGNIFICANT CHANGE UP (ref 0–0.5)
EOSINOPHIL NFR BLD AUTO: 4.7 % — SIGNIFICANT CHANGE UP (ref 0–6)
GLUCOSE BLDC GLUCOMTR-MCNC: 114 MG/DL — HIGH (ref 70–99)
GLUCOSE BLDC GLUCOMTR-MCNC: 125 MG/DL — HIGH (ref 70–99)
GLUCOSE BLDC GLUCOMTR-MCNC: 87 MG/DL — SIGNIFICANT CHANGE UP (ref 70–99)
GLUCOSE BLDC GLUCOMTR-MCNC: 87 MG/DL — SIGNIFICANT CHANGE UP (ref 70–99)
GLUCOSE SERPL-MCNC: 82 MG/DL — SIGNIFICANT CHANGE UP (ref 70–99)
HCT VFR BLD CALC: 33.6 % — LOW (ref 39–50)
HGB BLD-MCNC: 10.6 G/DL — LOW (ref 13–17)
IMM GRANULOCYTES NFR BLD AUTO: 0.3 % — SIGNIFICANT CHANGE UP (ref 0–1.5)
LYMPHOCYTES # BLD AUTO: 1.36 K/UL — SIGNIFICANT CHANGE UP (ref 1–3.3)
LYMPHOCYTES # BLD AUTO: 21.1 % — SIGNIFICANT CHANGE UP (ref 13–44)
MCHC RBC-ENTMCNC: 28.6 PG — SIGNIFICANT CHANGE UP (ref 27–34)
MCHC RBC-ENTMCNC: 31.5 GM/DL — LOW (ref 32–36)
MCV RBC AUTO: 90.6 FL — SIGNIFICANT CHANGE UP (ref 80–100)
MONOCYTES # BLD AUTO: 0.37 K/UL — SIGNIFICANT CHANGE UP (ref 0–0.9)
MONOCYTES NFR BLD AUTO: 5.7 % — SIGNIFICANT CHANGE UP (ref 2–14)
NEUTROPHILS # BLD AUTO: 4.36 K/UL — SIGNIFICANT CHANGE UP (ref 1.8–7.4)
NEUTROPHILS NFR BLD AUTO: 67.7 % — SIGNIFICANT CHANGE UP (ref 43–77)
NRBC # BLD: 0 /100 WBCS — SIGNIFICANT CHANGE UP (ref 0–0)
PLATELET # BLD AUTO: 195 K/UL — SIGNIFICANT CHANGE UP (ref 150–400)
POTASSIUM SERPL-MCNC: 4.1 MMOL/L — SIGNIFICANT CHANGE UP (ref 3.5–5.3)
POTASSIUM SERPL-SCNC: 4.1 MMOL/L — SIGNIFICANT CHANGE UP (ref 3.5–5.3)
PROT SERPL-MCNC: 7.2 G/DL — SIGNIFICANT CHANGE UP (ref 6–8.3)
RBC # BLD: 3.71 M/UL — LOW (ref 4.2–5.8)
RBC # FLD: 13.2 % — SIGNIFICANT CHANGE UP (ref 10.3–14.5)
SARS-COV-2 RNA SPEC QL NAA+PROBE: SIGNIFICANT CHANGE UP
SODIUM SERPL-SCNC: 137 MMOL/L — SIGNIFICANT CHANGE UP (ref 135–145)
WBC # BLD: 6.44 K/UL — SIGNIFICANT CHANGE UP (ref 3.8–10.5)
WBC # FLD AUTO: 6.44 K/UL — SIGNIFICANT CHANGE UP (ref 3.8–10.5)

## 2021-09-19 PROCEDURE — 99233 SBSQ HOSP IP/OBS HIGH 50: CPT

## 2021-09-19 RX ORDER — SODIUM CHLORIDE 9 MG/ML
1000 INJECTION INTRAMUSCULAR; INTRAVENOUS; SUBCUTANEOUS
Refills: 0 | Status: DISCONTINUED | OUTPATIENT
Start: 2021-09-19 | End: 2021-09-20

## 2021-09-19 RX ORDER — AMPICILLIN SODIUM AND SULBACTAM SODIUM 250; 125 MG/ML; MG/ML
INJECTION, POWDER, FOR SUSPENSION INTRAMUSCULAR; INTRAVENOUS
Refills: 0 | Status: DISCONTINUED | OUTPATIENT
Start: 2021-09-19 | End: 2021-09-22

## 2021-09-19 RX ORDER — AMPICILLIN SODIUM AND SULBACTAM SODIUM 250; 125 MG/ML; MG/ML
3 INJECTION, POWDER, FOR SUSPENSION INTRAMUSCULAR; INTRAVENOUS EVERY 6 HOURS
Refills: 0 | Status: DISCONTINUED | OUTPATIENT
Start: 2021-09-20 | End: 2021-09-22

## 2021-09-19 RX ORDER — AMPICILLIN SODIUM AND SULBACTAM SODIUM 250; 125 MG/ML; MG/ML
3 INJECTION, POWDER, FOR SUSPENSION INTRAMUSCULAR; INTRAVENOUS ONCE
Refills: 0 | Status: COMPLETED | OUTPATIENT
Start: 2021-09-19 | End: 2021-09-19

## 2021-09-19 RX ADMIN — Medication 325 MILLIGRAM(S): at 12:34

## 2021-09-19 RX ADMIN — OXYCODONE HYDROCHLORIDE 5 MILLIGRAM(S): 5 TABLET ORAL at 06:50

## 2021-09-19 RX ADMIN — Medication 1000 MILLIGRAM(S): at 22:00

## 2021-09-19 RX ADMIN — SODIUM CHLORIDE 60 MILLILITER(S): 9 INJECTION INTRAMUSCULAR; INTRAVENOUS; SUBCUTANEOUS at 12:34

## 2021-09-19 RX ADMIN — PREGABALIN 1000 MICROGRAM(S): 225 CAPSULE ORAL at 14:45

## 2021-09-19 RX ADMIN — Medication 1000 MILLIGRAM(S): at 06:50

## 2021-09-19 RX ADMIN — PIPERACILLIN AND TAZOBACTAM 25 GRAM(S): 4; .5 INJECTION, POWDER, LYOPHILIZED, FOR SOLUTION INTRAVENOUS at 05:44

## 2021-09-19 RX ADMIN — HEPARIN SODIUM 5000 UNIT(S): 5000 INJECTION INTRAVENOUS; SUBCUTANEOUS at 14:46

## 2021-09-19 RX ADMIN — OXYCODONE HYDROCHLORIDE 5 MILLIGRAM(S): 5 TABLET ORAL at 16:19

## 2021-09-19 RX ADMIN — INSULIN GLARGINE 8 UNIT(S): 100 INJECTION, SOLUTION SUBCUTANEOUS at 21:59

## 2021-09-19 RX ADMIN — OXYCODONE HYDROCHLORIDE 5 MILLIGRAM(S): 5 TABLET ORAL at 01:26

## 2021-09-19 RX ADMIN — OXYCODONE HYDROCHLORIDE 5 MILLIGRAM(S): 5 TABLET ORAL at 10:08

## 2021-09-19 RX ADMIN — Medication 81 MILLIGRAM(S): at 12:34

## 2021-09-19 RX ADMIN — Medication 1000 MILLIGRAM(S): at 16:10

## 2021-09-19 RX ADMIN — AMPICILLIN SODIUM AND SULBACTAM SODIUM 200 GRAM(S): 250; 125 INJECTION, POWDER, FOR SUSPENSION INTRAMUSCULAR; INTRAVENOUS at 16:13

## 2021-09-19 RX ADMIN — Medication 1000 MILLIGRAM(S): at 22:56

## 2021-09-19 RX ADMIN — OXYCODONE HYDROCHLORIDE 5 MILLIGRAM(S): 5 TABLET ORAL at 22:56

## 2021-09-19 RX ADMIN — ATORVASTATIN CALCIUM 80 MILLIGRAM(S): 80 TABLET, FILM COATED ORAL at 22:01

## 2021-09-19 RX ADMIN — PIPERACILLIN AND TAZOBACTAM 25 GRAM(S): 4; .5 INJECTION, POWDER, LYOPHILIZED, FOR SOLUTION INTRAVENOUS at 14:48

## 2021-09-19 RX ADMIN — Medication 1000 MILLIGRAM(S): at 14:45

## 2021-09-19 RX ADMIN — OXYCODONE HYDROCHLORIDE 5 MILLIGRAM(S): 5 TABLET ORAL at 11:00

## 2021-09-19 RX ADMIN — GABAPENTIN 100 MILLIGRAM(S): 400 CAPSULE ORAL at 05:45

## 2021-09-19 RX ADMIN — OXYCODONE HYDROCHLORIDE 5 MILLIGRAM(S): 5 TABLET ORAL at 17:19

## 2021-09-19 RX ADMIN — HEPARIN SODIUM 5000 UNIT(S): 5000 INJECTION INTRAVENOUS; SUBCUTANEOUS at 22:01

## 2021-09-19 RX ADMIN — ERGOCALCIFEROL 50000 UNIT(S): 1.25 CAPSULE ORAL at 12:34

## 2021-09-19 RX ADMIN — OXYCODONE HYDROCHLORIDE 5 MILLIGRAM(S): 5 TABLET ORAL at 02:20

## 2021-09-19 RX ADMIN — LIDOCAINE 1 APPLICATION(S): 4 CREAM TOPICAL at 14:46

## 2021-09-19 RX ADMIN — GABAPENTIN 100 MILLIGRAM(S): 400 CAPSULE ORAL at 17:22

## 2021-09-19 RX ADMIN — SENNA PLUS 2 TABLET(S): 8.6 TABLET ORAL at 22:01

## 2021-09-19 RX ADMIN — Medication 100 MILLIGRAM(S): at 05:45

## 2021-09-19 RX ADMIN — Medication 1000 MILLIGRAM(S): at 05:45

## 2021-09-19 RX ADMIN — LISINOPRIL 10 MILLIGRAM(S): 2.5 TABLET ORAL at 05:45

## 2021-09-19 RX ADMIN — OXYCODONE HYDROCHLORIDE 5 MILLIGRAM(S): 5 TABLET ORAL at 05:46

## 2021-09-19 RX ADMIN — HEPARIN SODIUM 5000 UNIT(S): 5000 INJECTION INTRAVENOUS; SUBCUTANEOUS at 05:49

## 2021-09-19 RX ADMIN — OXYCODONE HYDROCHLORIDE 5 MILLIGRAM(S): 5 TABLET ORAL at 22:01

## 2021-09-19 RX ADMIN — Medication 60 MILLIGRAM(S): at 05:45

## 2021-09-19 NOTE — PROGRESS NOTE ADULT - SUBJECTIVE AND OBJECTIVE BOX
S: Patient reports acute pain in the left foot this morning. Denies fevers, chills, chest pain, shortness of breath.    O:  Vital Signs Last 24 Hrs  T(C): 37.3 (19 Sep 2021 14:16), Max: 37.3 (19 Sep 2021 14:16)  T(F): 99.1 (19 Sep 2021 14:16), Max: 99.1 (19 Sep 2021 14:16)  HR: 78 (19 Sep 2021 14:16) (78 - 79)  BP: 136/64 (19 Sep 2021 14:16) (121/49 - 136/64)  BP(mean): --  RR: 18 (19 Sep 2021 14:16) (17 - 18)  SpO2: 98% (19 Sep 2021 14:16) (98% - 100%)    GENERAL: NAD, well-developed  HEAD:  Atraumatic, Normocephalic  EYES: EOMI, PERRLA, conjunctiva and sclera clear  NECK: Supple, No JVD  CHEST/LUNG: Clear to auscultation bilaterally; No wheeze  HEART: Regular rate and rhythm; systolic crescendo decrescendo murmur  ABDOMEN: Soft, Nontender, Nondistended; Bowel sounds present  EXTREMITIES:  Right foot dressing c/d/i  PSYCH: AAOx3  NEUROLOGY: non-focal  SKIN: No rashes or lesions    acetaminophen   Tablet .. 1000 milliGRAM(s) Oral every 8 hours  ampicillin/sulbactam  IVPB 3 Gram(s) IV Intermittent once  ampicillin/sulbactam  IVPB      aspirin  chewable 81 milliGRAM(s) Oral daily  atorvastatin 80 milliGRAM(s) Oral at bedtime  bisacodyl Suppository 10 milliGRAM(s) Rectal once  cyanocobalamin 1000 MICROGram(s) Oral daily  ergocalciferol 13505 Unit(s) Oral <User Schedule>  ferrous    sulfate 325 milliGRAM(s) Oral daily  gabapentin 100 milliGRAM(s) Oral every 12 hours  heparin   Injectable 5000 Unit(s) SubCutaneous every 8 hours  influenza   Vaccine 0.5 milliLiter(s) IntraMuscular once  insulin glargine Injectable (LANTUS) 8 Unit(s) SubCutaneous at bedtime  insulin lispro (ADMELOG) corrective regimen sliding scale   SubCutaneous Before meals and at bedtime  levoFLOXacin IVPB 500 milliGRAM(s) IV Intermittent once  levoFLOXacin IVPB      lidocaine 5% Ointment 1 Application(s) Topical daily  lisinopril 10 milliGRAM(s) Oral daily  metoprolol succinate  milliGRAM(s) Oral daily  morphine  - Injectable 2 milliGRAM(s) IV Push daily PRN  NIFEdipine XL 60 milliGRAM(s) Oral daily  oxyCODONE    IR 5 milliGRAM(s) Oral every 4 hours PRN  polyethylene glycol 3350 17 Gram(s) Oral daily  senna 2 Tablet(s) Oral at bedtime  sodium chloride 0.9%. 1000 milliLiter(s) IV Continuous <Continuous>                            10.6   6.44  )-----------( 195      ( 19 Sep 2021 06:31 )             33.6       09-19    137  |  107  |  17  ----------------------------<  82  4.1   |  22  |  1.85<H>    Ca    9.1      19 Sep 2021 06:28    TPro  7.2  /  Alb  2.7<L>  /  TBili  0.6  /  DBili  x   /  AST  10  /  ALT  13  /  AlkPhos  97  09-19

## 2021-09-19 NOTE — PROGRESS NOTE ADULT - SUBJECTIVE AND OBJECTIVE BOX
Patient is seen and examined at the bed side, is afebrile. The  kidney function is worsening.      REVIEW OF SYSTEMS: All other review systems are negative      ALLERGIES: No Known Allergies      Vital Signs Last 24 Hrs  T(C): 37.3 (19 Sep 2021 14:16), Max: 37.3 (19 Sep 2021 14:16)  T(F): 99.1 (19 Sep 2021 14:16), Max: 99.1 (19 Sep 2021 14:16)  HR: 78 (19 Sep 2021 14:16) (78 - 79)  BP: 136/64 (19 Sep 2021 14:16) (121/49 - 136/64)  BP(mean): --  RR: 18 (19 Sep 2021 14:16) (17 - 18)  SpO2: 98% (19 Sep 2021 14:16) (98% - 100%)      PHYSICAL EXAM:  GENERAL: Not in distress   CHEST/LUNG:  Not using accessory muscles  HEART: s1 and s2 present  ABDOMEN:  Nontender and  Nondistended  EXTREMITIES: Right foot bandage in placed   CNS: Awake and Alert      LABS:                        10.6   6.44  )-----------( 195      ( 19 Sep 2021 06:31 )             33.6                           11.1   6.94  )-----------( 202      ( 18 Sep 2021 06:15 )             34.9         09-19    137  |  107  |  17  ----------------------------<  82  4.1   |  22  |  1.85<H>    Ca    9.1      19 Sep 2021 06:28    TPro  7.2  /  Alb  2.7<L>  /  TBili  0.6  /  DBili  x   /  AST  10  /  ALT  13  /  AlkPhos  97  09-19    09-18    137  |  106  |  14  ----------------------------<  117<H>  4.6   |  26  |  1.48<H>    Ca    9.0      18 Sep 2021 06:15  Phos  3.0     09-17  Mg     1.9     09-17    TPro  7.5  /  Alb  3.2<L>  /  TBili  0.8  /  DBili  x   /  AST  12  /  ALT  20  /  AlkPhos  120  09-17        CAPILLARY BLOOD GLUCOSE  POCT Blood Glucose.: 134 mg/dL (17 Sep 2021 17:11)  POCT Blood Glucose.: 128 mg/dL (17 Sep 2021 11:06)  POCT Blood Glucose.: 157 mg/dL (17 Sep 2021 04:10)      MEDICATIONS  (STANDING):    acetaminophen   Tablet .. 1000 milliGRAM(s) Oral every 8 hours  aspirin  chewable 81 milliGRAM(s) Oral daily  atorvastatin 80 milliGRAM(s) Oral at bedtime  bisacodyl Suppository 10 milliGRAM(s) Rectal once  cyanocobalamin 1000 MICROGram(s) Oral daily  ergocalciferol 91816 Unit(s) Oral <User Schedule>  ferrous    sulfate 325 milliGRAM(s) Oral daily  gabapentin 100 milliGRAM(s) Oral every 12 hours  heparin   Injectable 5000 Unit(s) SubCutaneous every 8 hours  influenza   Vaccine 0.5 milliLiter(s) IntraMuscular once  insulin glargine Injectable (LANTUS) 8 Unit(s) SubCutaneous at bedtime  insulin lispro (ADMELOG) corrective regimen sliding scale   SubCutaneous Before meals and at bedtime  lidocaine 5% Ointment 1 Application(s) Topical daily  lisinopril 10 milliGRAM(s) Oral daily  metoprolol succinate  milliGRAM(s) Oral daily  NIFEdipine XL 60 milliGRAM(s) Oral daily  piperacillin/tazobactam IVPB.. 3.375 Gram(s) IV Intermittent every 8 hours  polyethylene glycol 3350 17 Gram(s) Oral daily  senna 2 Tablet(s) Oral at bedtime  sodium chloride 0.9%. 1000 milliLiter(s) (60 mL/Hr) IV Continuous <Continuous>      RADIOLOGY & ADDITIONAL TESTS:    9/17/21 : MR Foot No Cont, Right (09.17.21 @ 13:04) : Resection at the mid aspect of the fifth metatarsal. Lateral soft tissue wound with marrow signal abnormality throughout the fifth metatarsal and within the adjacent fourth metatarsal head which is nonspecific in the setting of recent surgery although osteomyelitis is suspected.    9/16/21 : Xray Foot AP + Lateral + Oblique, Right (09.16.21 @ 15:03) : No acute finding. No plain film evidence of osteomyelitis.        MICROBIOLOGY DATA:    COVID-19 David Domain Antibody (09.18.21 @ 12:55)   COVID-19 David Domain Antibody Result: >250.00:    Culture - Blood (09.17.21 @ 10:28)   Specimen Source: .Blood Blood-Peripheral   Culture Results: No growth to date.     Culture - Blood (09.17.21 @ 10:28)   Specimen Source: .Blood Blood-Venous   Culture Results: No growth to date.     Culture - Surgical Swab (09.16.21 @ 21:42)   - Amikacin: S <=16   - Amikacin: S <=16   - Amoxicillin/Clavulanic Acid: S <=8/4   - Ampicillin: R >16 These ampicillin results predict results for amoxicillin   - Ampicillin: S <=2 Predicts results to ampicillin/sulbactam, amoxacillin-clavulanate and piperacillin-tazobactam.   - Ampicillin/Sulbactam: S 8/4 Enterobacter, Citrobacter, and Serratia may develop resistance during prolonged therapy (3-4 days)   - Ampicillin/Sulbactam: R >16/8   - Aztreonam: S <=4   - Aztreonam: S <=4   - Cefazolin: R 16 Enterobacter, Citrobacter, and Serratia may develop resistance during prolonged therapy (3-4 days)   - Cefazolin: R >16   - Cefepime: S <=2   - Cefepime: S <=2   - Cefoxitin: S <=8   - Cefoxitin: S <=8   - Ceftazidime: S <=1   - Ceftriaxone: S <=1   - Ceftriaxone: S <=1 Enterobacter, Citrobacter, and Serratia may develop resistance during prolonged therapy   - Ciprofloxacin: S <=0.25   - Ciprofloxacin: S <=0.25   - Ertapenem: S <=0.5   - Gentamicin: S <=2   - Gentamicin: S <=2   - Imipenem: S <=1   - Levofloxacin: S <=0.5   - Levofloxacin: S <=0.5   - Meropenem: S <=1   - Meropenem: S <=1   - Piperacillin/Tazobactam: S <=8   - Piperacillin/Tazobactam: S <=8   - Tetra/Doxy: S 4   - Tobramycin: S <=2   - Trimethoprim/Sulfamethoxazole: S <=0.5/9.5   - Trimethoprim/Sulfamethoxazole: S <=0.5/9.5   - Vancomycin: S 2   Specimen Source: .Surgical Swab right foot wound   Culture Results:   Culture yields >4 types of aerobic and/or anaerobic bacteria   Call client services within 7 days if further workup is clinically   indicated. Culture includes   Rare Aeromonas hydrophila/caviae   Few Klebsiella oxytoca/Raoutella ornithinolytica   Few Enterococcus faecalis   Few Streptococcus agalactiae (Group B) isolated   Group B streptococci are susceptible to ampicillin,   penicillin and cefazolin, but may be resistant to   erythromycin and clindamycin.   Recommendations for intrapartum prophylaxis for Group B   streptococci are penicillin or ampicillin.   Organism Identification: Aeromonas hydrophila/caviae   Klebsiella oxytoca /Raoutella ornithinolytica   Enterococcus faecalis   Organism: Aeromonas hydrophila/caviae   Organism: Klebsiella oxytoca /Raoutella ornithinolytica   Organism: Enterococcus faecalis   COVID-19 PCR . (09.16.21 @ 22:24)   COVID-19 PCR: NotDetec:             Patient is seen and examined at the bed side, is afebrile. The  kidney function is worsening. He is c/o right foot pain.       REVIEW OF SYSTEMS: All other review systems are negative      ALLERGIES: No Known Allergies      Vital Signs Last 24 Hrs  T(C): 37.3 (19 Sep 2021 14:16), Max: 37.3 (19 Sep 2021 14:16)  T(F): 99.1 (19 Sep 2021 14:16), Max: 99.1 (19 Sep 2021 14:16)  HR: 78 (19 Sep 2021 14:16) (78 - 79)  BP: 136/64 (19 Sep 2021 14:16) (121/49 - 136/64)  BP(mean): --  RR: 18 (19 Sep 2021 14:16) (17 - 18)  SpO2: 98% (19 Sep 2021 14:16) (98% - 100%)      PHYSICAL EXAM:  GENERAL: Not in distress   CHEST/LUNG:  Not using accessory muscles  HEART: s1 and s2 present  ABDOMEN:  Nontender and  Nondistended  EXTREMITIES: Right foot bandage in placed   CNS: Awake and Alert      LABS:                        10.6   6.44  )-----------( 195      ( 19 Sep 2021 06:31 )             33.6                           11.1   6.94  )-----------( 202      ( 18 Sep 2021 06:15 )             34.9         09-19    137  |  107  |  17  ----------------------------<  82  4.1   |  22  |  1.85<H>    Ca    9.1      19 Sep 2021 06:28    TPro  7.2  /  Alb  2.7<L>  /  TBili  0.6  /  DBili  x   /  AST  10  /  ALT  13  /  AlkPhos  97  09-19 09-18    137  |  106  |  14  ----------------------------<  117<H>  4.6   |  26  |  1.48<H>    Ca    9.0      18 Sep 2021 06:15  Phos  3.0     09-17  Mg     1.9     09-17    TPro  7.5  /  Alb  3.2<L>  /  TBili  0.8  /  DBili  x   /  AST  12  /  ALT  20  /  AlkPhos  120  09-17        CAPILLARY BLOOD GLUCOSE  POCT Blood Glucose.: 134 mg/dL (17 Sep 2021 17:11)  POCT Blood Glucose.: 128 mg/dL (17 Sep 2021 11:06)  POCT Blood Glucose.: 157 mg/dL (17 Sep 2021 04:10)      MEDICATIONS  (STANDING):    acetaminophen   Tablet .. 1000 milliGRAM(s) Oral every 8 hours  aspirin  chewable 81 milliGRAM(s) Oral daily  atorvastatin 80 milliGRAM(s) Oral at bedtime  bisacodyl Suppository 10 milliGRAM(s) Rectal once  cyanocobalamin 1000 MICROGram(s) Oral daily  ergocalciferol 53233 Unit(s) Oral <User Schedule>  ferrous    sulfate 325 milliGRAM(s) Oral daily  gabapentin 100 milliGRAM(s) Oral every 12 hours  heparin   Injectable 5000 Unit(s) SubCutaneous every 8 hours  influenza   Vaccine 0.5 milliLiter(s) IntraMuscular once  insulin glargine Injectable (LANTUS) 8 Unit(s) SubCutaneous at bedtime  insulin lispro (ADMELOG) corrective regimen sliding scale   SubCutaneous Before meals and at bedtime  lidocaine 5% Ointment 1 Application(s) Topical daily  lisinopril 10 milliGRAM(s) Oral daily  metoprolol succinate  milliGRAM(s) Oral daily  NIFEdipine XL 60 milliGRAM(s) Oral daily  piperacillin/tazobactam IVPB.. 3.375 Gram(s) IV Intermittent every 8 hours  polyethylene glycol 3350 17 Gram(s) Oral daily  senna 2 Tablet(s) Oral at bedtime  sodium chloride 0.9%. 1000 milliLiter(s) (60 mL/Hr) IV Continuous <Continuous>      RADIOLOGY & ADDITIONAL TESTS:    9/17/21 : MR Foot No Cont, Right (09.17.21 @ 13:04) : Resection at the mid aspect of the fifth metatarsal. Lateral soft tissue wound with marrow signal abnormality throughout the fifth metatarsal and within the adjacent fourth metatarsal head which is nonspecific in the setting of recent surgery although osteomyelitis is suspected.    9/16/21 : Xray Foot AP + Lateral + Oblique, Right (09.16.21 @ 15:03) : No acute finding. No plain film evidence of osteomyelitis.        MICROBIOLOGY DATA:    COVID-19 David Domain Antibody (09.18.21 @ 12:55)   COVID-19 David Domain Antibody Result: >250.00:    Culture - Blood (09.17.21 @ 10:28)   Specimen Source: .Blood Blood-Peripheral   Culture Results: No growth to date.     Culture - Blood (09.17.21 @ 10:28)   Specimen Source: .Blood Blood-Venous   Culture Results: No growth to date.     Culture - Surgical Swab (09.16.21 @ 21:42)   - Amikacin: S <=16   - Amikacin: S <=16   - Amoxicillin/Clavulanic Acid: S <=8/4   - Ampicillin: R >16 These ampicillin results predict results for amoxicillin   - Ampicillin: S <=2 Predicts results to ampicillin/sulbactam, amoxacillin-clavulanate and piperacillin-tazobactam.   - Ampicillin/Sulbactam: S 8/4 Enterobacter, Citrobacter, and Serratia may develop resistance during prolonged therapy (3-4 days)   - Ampicillin/Sulbactam: R >16/8   - Aztreonam: S <=4   - Aztreonam: S <=4   - Cefazolin: R 16 Enterobacter, Citrobacter, and Serratia may develop resistance during prolonged therapy (3-4 days)   - Cefazolin: R >16   - Cefepime: S <=2   - Cefepime: S <=2   - Cefoxitin: S <=8   - Cefoxitin: S <=8   - Ceftazidime: S <=1   - Ceftriaxone: S <=1   - Ceftriaxone: S <=1 Enterobacter, Citrobacter, and Serratia may develop resistance during prolonged therapy   - Ciprofloxacin: S <=0.25   - Ciprofloxacin: S <=0.25   - Ertapenem: S <=0.5   - Gentamicin: S <=2   - Gentamicin: S <=2   - Imipenem: S <=1   - Levofloxacin: S <=0.5   - Levofloxacin: S <=0.5   - Meropenem: S <=1   - Meropenem: S <=1   - Piperacillin/Tazobactam: S <=8   - Piperacillin/Tazobactam: S <=8   - Tetra/Doxy: S 4   - Tobramycin: S <=2   - Trimethoprim/Sulfamethoxazole: S <=0.5/9.5   - Trimethoprim/Sulfamethoxazole: S <=0.5/9.5   - Vancomycin: S 2   Specimen Source: .Surgical Swab right foot wound   Culture Results:   Culture yields >4 types of aerobic and/or anaerobic bacteria   Call client services within 7 days if further workup is clinically   indicated. Culture includes   Rare Aeromonas hydrophila/caviae   Few Klebsiella oxytoca/Raoutella ornithinolytica   Few Enterococcus faecalis   Few Streptococcus agalactiae (Group B) isolated   Group B streptococci are susceptible to ampicillin,   penicillin and cefazolin, but may be resistant to   erythromycin and clindamycin.   Recommendations for intrapartum prophylaxis for Group B   streptococci are penicillin or ampicillin.   Organism Identification: Aeromonas hydrophila/caviae   Klebsiella oxytoca /Raoutella ornithinolytica   Enterococcus faecalis   Organism: Aeromonas hydrophila/caviae   Organism: Klebsiella oxytoca /Raoutella ornithinolytica   Organism: Enterococcus faecalis   COVID-19 PCR . (09.16.21 @ 22:24)   COVID-19 PCR: NotDetec:

## 2021-09-19 NOTE — PROGRESS NOTE ADULT - PROBLEM SELECTOR PLAN 1
- on vancomycin and zosyn, check Vanc trough prior to 4th dose  - MRI suspicious for osteomyelitis, but may be due to recent surgical changes  - f/u surgical wound cultures  - ID following, appreciate recommendations, on unasyn and levaquin  - Podiatry plan to go to the OR tomorrow  - pain management following, appreciate recommendations

## 2021-09-19 NOTE — PROGRESS NOTE ADULT - PROBLEM SELECTOR PLAN 7
PT consulted, recommend STEVAN  daughter would like discharge to [facility] as wife is also admitted there

## 2021-09-19 NOTE — PROGRESS NOTE ADULT - PROBLEM SELECTOR PLAN 3
- Cr 1.86, baseline around 1.4  - avoid nephrotoxic medications, change zosyn to unasyn and levaquin  - cautious fluid hydration given AS

## 2021-09-19 NOTE — PROGRESS NOTE ADULT - PROBLEM SELECTOR PLAN 2
- patient has severe AS  - denies chest pain or shortness of breath  - no signs of ACS or decompensated heart failure  - previously seen by cardiology in August and optimized for left foot procedure, tolerated without issue  - Espinoza Perioperative Risk Score 2.1% risk of MI or cardiac rest  - patient is optimized from a medical perspective, okay to proceed without further testing, patient is high risk due to severe aortic stenosis for a low risk procedure

## 2021-09-19 NOTE — PROGRESS NOTE ADULT - PROBLEM SELECTOR PLAN 1
- on vancomycin and zosyn, check Vanc trough prior to 4th dose  - MRI suspicious for osteomyelitis, but may be due to recent surgical changes  - f/u surgical wound cultures  - ID following, appreciate recommendations  - tentative plan for OR next week with podiatry  - pain management following, appreciate recommendations

## 2021-09-19 NOTE — PROGRESS NOTE ADULT - PROBLEM SELECTOR PLAN 4
- Echo with CINTIA 1.0  - seen by Dr. Wilkins on last admission, SABIHA and TAVR evaluation after infectious work-up complete

## 2021-09-19 NOTE — PROGRESS NOTE ADULT - PROBLEM SELECTOR PLAN 9
PT consulted, recommend STEVAN  daughter would like discharge to [facility] as wife is also admitted there

## 2021-09-19 NOTE — PROGRESS NOTE ADULT - ASSESSMENT
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  As per daughter, patient was taking tylenol which did not help his pain prompting the patient to ask his daughter to take him to the hospital. ON admission, he has no fever or Leukocytosis. The Xray of Right foot shows no Osteomyelitis but MRI of Right foot shows Lateral soft tissue wound with marrow signal abnormality throughout the fifth metatarsal which is consistent of Osteomyelitis. He has seen by Podiatry and wound culture has sent, Zosyn and Vancomycin has started. The ID consult requested to assist with further evaluation and antibiotic management.     # Right Fifth metatarsal DFU with drainage and Osteomyelitis- wound cx grew Enterococcus, Aeromonas and Klebsiella - Zosyn is the ideal to cover All organisms but kidney function is worsening, hence change to Cefepime and Linezolid until kidney function is improved     would recommend:    1. Change Zosyn to Cefepime and Linezolid to cover All Organisms grew In wound culture, until kidney function is improved   2. Monitor Kidney function and adjust Abx doses accordingly  3. Wound care as per Podiatry  4. OOB to chair    d/w Covering NP, patient and his daughter at the bed side    Attending Attestation:    Spent more than 45 minutes on total encounter, more than 50 % of the visit was spent counseling and/or coordinating care by the Attending physician. 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  As per daughter, patient was taking tylenol which did not help his pain prompting the patient to ask his daughter to take him to the hospital. ON admission, he has no fever or Leukocytosis. The Xray of Right foot shows no Osteomyelitis but MRI of Right foot shows Lateral soft tissue wound with marrow signal abnormality throughout the fifth metatarsal which is consistent of Osteomyelitis. He has seen by Podiatry and wound culture has sent, Zosyn and Vancomycin has started. The ID consult requested to assist with further evaluation and antibiotic management.     # Right Fifth metatarsal DFU with drainage and Osteomyelitis- wound cx grew Enterococcus, Aeromonas and Klebsiella - Zosyn is the ideal to cover All organisms but kidney function is worsening, hence change to Unasyn and Levaquin until kidney function is improved     would recommend:    1. Change Zosyn to Unasyn and Levaquin  to cover All Organisms grew In wound culture, until kidney function is improved   2. Monitor Kidney function and adjust Abx doses accordingly  3. Wound care as per Podiatry  4. OOB to chair    d/w Covering NP, patient and his daughter at the bed side    Attending Attestation:    Spent more than 45 minutes on total encounter, more than 50 % of the visit was spent counseling and/or coordinating care by the Attending physician. 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  As per daughter, patient was taking tylenol which did not help his pain prompting the patient to ask his daughter to take him to the hospital. ON admission, he has no fever or Leukocytosis. The Xray of Right foot shows no Osteomyelitis but MRI of Right foot shows Lateral soft tissue wound with marrow signal abnormality throughout the fifth metatarsal which is consistent of Osteomyelitis. He has seen by Podiatry and wound culture has sent, Zosyn and Vancomycin has started. The ID consult requested to assist with further evaluation and antibiotic management.     # Right Fifth metatarsal DFU with drainage and Osteomyelitis- wound cx grew Enterococcus, Aeromonas and Klebsiella - Zosyn is the ideal to cover All organisms but kidney function is worsening, hence change to Unasyn and Levaquin until kidney function is improved     would recommend:    1. Change Zosyn to Unasyn and Levaquin  to cover All Organisms grew In wound culture, until kidney function is improved   2. Monitor Kidney function and adjust Abx doses accordingly  3. Wound care as per Podiatry  4. OOB to chair  5. Pain management as needed    d/w patient and Nursing staff     Attending Attestation:    Spent more than 45 minutes on total encounter, more than 50 % of the visit was spent counseling and/or coordinating care by the Attending physician.

## 2021-09-19 NOTE — PROGRESS NOTE ADULT - ASSESSMENT
A:   Right 5th ray wound dehiscence  s/p R 5th ray resection - 8/19     P:  Patient evaluated, chart reviewed  Xrays reviewed  ESR/CRP reviewed   MRI reviewed   Removed the dressings and evaluated the wound  Applied Santly, DSD, ACE    Possible OR wound debridement with wound vac application wed 9/20  Requesting medical optimization   Appreciate ID consult   Podiatry will follow while in house  Discussed with  and evaluated bedside with Dr. Choe    A:   Right 5th ray wound dehiscence  s/p R 5th ray resection - 8/19     P:  Patient evaluated, chart reviewed  Xrays reviewed  ESR/CRP reviewed   MRI reviewed   Removed the dressings and evaluated the wound  Applied Santly, DSD, ACE    Possible OR wound debridement, garft application, with wound vac application wed-Time tbd   Requesting medical optimization   Appreciate ID consult   Podiatry will follow while in house  Discussed with  and evaluated bedside with Dr. Choe

## 2021-09-19 NOTE — PROGRESS NOTE ADULT - SUBJECTIVE AND OBJECTIVE BOX
Podiatry Interval: Pt seen bedside AAOx3. Pt endorses pain to R 5th surgical site. Denies constitutional symptoms. States if consent for anything to please call his daughter Korin telephone number: [phone]. No other pedal complaints.     Podiatry HPI: 76 year old male with a PMHx of DM, HTN, HLD, CAD to ED presents to the ED for a Right Foot s/p 5th foot partial ray resection and fillet toe flap. Patient states that he has sharp localized non-radiating pain to the Right foot 5th metatarsal. States that after his surgery, he did not see a podiatrist and he came into the ED because he saw drainage and was having pain. Denies any constitutional symptoms of N/V/C/F/SOB. Denies any other pedal complaints at this time. Denies calf pain today, bilaterally.    Medications acetaminophen   Tablet .. 1000 milliGRAM(s) Oral every 8 hours  aspirin  chewable 81 milliGRAM(s) Oral daily  atorvastatin 80 milliGRAM(s) Oral at bedtime  bisacodyl Suppository 10 milliGRAM(s) Rectal once  cyanocobalamin 1000 MICROGram(s) Oral daily  ergocalciferol 31831 Unit(s) Oral <User Schedule>  ferrous    sulfate 325 milliGRAM(s) Oral daily  gabapentin 100 milliGRAM(s) Oral every 12 hours  heparin   Injectable 5000 Unit(s) SubCutaneous every 8 hours  influenza   Vaccine 0.5 milliLiter(s) IntraMuscular once  insulin glargine Injectable (LANTUS) 8 Unit(s) SubCutaneous at bedtime  insulin lispro (ADMELOG) corrective regimen sliding scale   SubCutaneous Before meals and at bedtime  lidocaine 5% Ointment 1 Application(s) Topical daily  lisinopril 10 milliGRAM(s) Oral daily  metoprolol succinate  milliGRAM(s) Oral daily  morphine  - Injectable 2 milliGRAM(s) IV Push daily PRN  NIFEdipine XL 60 milliGRAM(s) Oral daily  oxyCODONE    IR 5 milliGRAM(s) Oral every 4 hours PRN  piperacillin/tazobactam IVPB.. 3.375 Gram(s) IV Intermittent every 8 hours  polyethylene glycol 3350 17 Gram(s) Oral daily  senna 2 Tablet(s) Oral at bedtime  sodium chloride 0.9%. 1000 milliLiter(s) IV Continuous <Continuous>    FHFamily history of acute myocardial infarction (Father)    Family history of diabetes mellitus (Mother, Sibling)    Family history of hypertension (Mother, Sibling)    Family history of hyperlipidemia (Mother, Sibling)    ,   PMHHTN (hypertension)    HLD (hyperlipidemia)    DM (diabetes mellitus)    CAD (coronary artery disease)    Glaucoma, angle-closure       PSHNo significant past surgical history    S/P CABG (coronary artery bypass graft)    History of thyroid surgery        Labs                          10.6   6.44  )-----------( 195      ( 19 Sep 2021 06:31 )             33.6      09-19    137  |  107  |  17  ----------------------------<  82  4.1   |  22  |  1.85<H>    Ca    9.1      19 Sep 2021 06:28    TPro  7.2  /  Alb  2.7<L>  /  TBili  0.6  /  DBili  x   /  AST  10  /  ALT  13  /  AlkPhos  97  09-19     Vital Signs Last 24 Hrs  T(C): 37 (19 Sep 2021 05:05), Max: 37.1 (18 Sep 2021 13:36)  T(F): 98.6 (19 Sep 2021 05:05), Max: 98.8 (18 Sep 2021 13:36)  HR: 78 (19 Sep 2021 05:05) (74 - 79)  BP: 136/58 (19 Sep 2021 05:05) (116/49 - 136/58)  BP(mean): --  RR: 17 (19 Sep 2021 05:05) (17 - 18)  SpO2: 100% (19 Sep 2021 05:05) (99% - 100%)  Sedimentation Rate, Erythrocyte: 77 mm/Hr (09-16-21 @ 16:03)  Sedimentation Rate, Erythrocyte: 91 mm/Hr (08-22-21 @ 15:41)         C-Reactive Protein, Serum: 45 mg/L (09-16-21 @ 23:33)  C-Reactive Protein, Serum: 85 mg/L (08-22-21 @ 21:30)  C-Reactive Protein, Serum: 67 mg/L (08-15-21 @ 11:55)   WBC Count: 6.44 K/uL (09-19-21 @ 06:31)        PHYSICAL EXAM  GEN: YESICA, SHER is a pleasant well-nourished, well developed 76y Male in no acute distress, alert awake, and oriented to person, place and time.   LE Focused:    Vasc:  DP/PT pulses were faintly palpable, bilateral. DP/PT pulses were monophasic on doppler, bilaterally. CFT<3 seconds to all digits.   Derm: Surgical Incision site dehiscence noted to the lateral aspect of the right foot approx (7.5cm x 3.5cm x 2.5cm), tunnels to the proximal aspect to the bone, +PTB, hyperpigmentation surrounding the wound, erythematous when compared to the contralateral side, no drainage. No malodor noted. Fibrogranular wound base noted. No streaking noted.   Neuro: Protective sensation diminished, b/l  MSK: +5/5 muscle strength noted to the pedal compartments. 5th digit amputation on right foot, noted.     Imaging:  INTERPRETATION:  Postop, rule out infection.    3 views right foot. Prior 8/20/2021.     Status post resection of the fifth toe and distal fifth metatarsal unchanged in appearance. No focal bone lysis or unusual periosteal reaction to suggest osteomyelitis. No acute fracture or dislocation. The remainder study unchanged. No soft tissue gas.    IMPRESSION: No acute finding. No plain film evidence of osteomyelitis.        MRI R foot:     INTERPRETATION:  Clinical Information: Recent fifth metatarsal head resection now with fifth digit pain, swelling and foul discharge.    Comparison: Radiographs of the right foot from 9/16/2021 and MRI the right foot from 8/16/2021.    Technique:  MRI of the right midfoot and forefoot.  Intravenous Contrast: None.    Findings:    There is a resection at the mid aspect of the fifth metatarsal shaft. There is a lateral soft tissue wound beginning at the level of the amputation which extends distally. There is susceptibility artifact in the region of the amputation consistent with postoperative change. There is hyperintense T2 marrow signal throughout the remaining fifth metatarsal and within the adjacent fourth metatarsal head which is nonspecific and could be related to recent postoperative changes although osteomyelitis is suspected.    There is edema and mild atrophy within the plantar muscles of the foot. There is minimal spurring at the first metatarsophalangeal and hallux sesamoid articulations.    Impression:  Resection at the mid aspect of the fifth metatarsal. Lateral soft tissue wound with marrow signal abnormality throughout the fifth metatarsal and within the adjacent fourth metatarsal head which is nonspecific in the setting of recent surgery although osteomyelitis is suspected.      Culture Results:   Rare Aeromonas hydrophila/caviae   Few Klebsiella oxytoca/Raoutella ornithinolytica   Few Enterococcus faecalis   Few Streptococcus agalactiae (Group B) isolated   Group B streptococci are susceptible to ampicillin,   penicillin and cefazolin, but may be resistant to   erythromycin and clindamycin.   Recommendations for intrapartum prophylaxis for Group B   streptococci are penicillin or ampicillin. (09.16.21 @ 21:42)

## 2021-09-20 DIAGNOSIS — M86.10 OTHER ACUTE OSTEOMYELITIS, UNSPECIFIED SITE: ICD-10-CM

## 2021-09-20 LAB
ALBUMIN SERPL ELPH-MCNC: 2.6 G/DL — LOW (ref 3.5–5)
ALP SERPL-CCNC: 103 U/L — SIGNIFICANT CHANGE UP (ref 40–120)
ALT FLD-CCNC: 12 U/L DA — SIGNIFICANT CHANGE UP (ref 10–60)
ANION GAP SERPL CALC-SCNC: 11 MMOL/L — SIGNIFICANT CHANGE UP (ref 5–17)
AST SERPL-CCNC: 11 U/L — SIGNIFICANT CHANGE UP (ref 10–40)
BASOPHILS # BLD AUTO: 0.01 K/UL — SIGNIFICANT CHANGE UP (ref 0–0.2)
BASOPHILS NFR BLD AUTO: 0.2 % — SIGNIFICANT CHANGE UP (ref 0–2)
BILIRUB SERPL-MCNC: 0.5 MG/DL — SIGNIFICANT CHANGE UP (ref 0.2–1.2)
BUN SERPL-MCNC: 16 MG/DL — SIGNIFICANT CHANGE UP (ref 7–18)
CALCIUM SERPL-MCNC: 8.7 MG/DL — SIGNIFICANT CHANGE UP (ref 8.4–10.5)
CHLORIDE SERPL-SCNC: 109 MMOL/L — HIGH (ref 96–108)
CO2 SERPL-SCNC: 18 MMOL/L — LOW (ref 22–31)
CREAT SERPL-MCNC: 1.48 MG/DL — HIGH (ref 0.5–1.3)
EOSINOPHIL # BLD AUTO: 0.15 K/UL — SIGNIFICANT CHANGE UP (ref 0–0.5)
EOSINOPHIL NFR BLD AUTO: 2.4 % — SIGNIFICANT CHANGE UP (ref 0–6)
GLUCOSE BLDC GLUCOMTR-MCNC: 127 MG/DL — HIGH (ref 70–99)
GLUCOSE BLDC GLUCOMTR-MCNC: 73 MG/DL — SIGNIFICANT CHANGE UP (ref 70–99)
GLUCOSE BLDC GLUCOMTR-MCNC: 75 MG/DL — SIGNIFICANT CHANGE UP (ref 70–99)
GLUCOSE BLDC GLUCOMTR-MCNC: 79 MG/DL — SIGNIFICANT CHANGE UP (ref 70–99)
GLUCOSE BLDC GLUCOMTR-MCNC: 83 MG/DL — SIGNIFICANT CHANGE UP (ref 70–99)
GLUCOSE SERPL-MCNC: 69 MG/DL — LOW (ref 70–99)
HCT VFR BLD CALC: 32.1 % — LOW (ref 39–50)
HGB BLD-MCNC: 10.4 G/DL — LOW (ref 13–17)
IMM GRANULOCYTES NFR BLD AUTO: 0.3 % — SIGNIFICANT CHANGE UP (ref 0–1.5)
LYMPHOCYTES # BLD AUTO: 0.8 K/UL — LOW (ref 1–3.3)
LYMPHOCYTES # BLD AUTO: 12.9 % — LOW (ref 13–44)
MCHC RBC-ENTMCNC: 29.2 PG — SIGNIFICANT CHANGE UP (ref 27–34)
MCHC RBC-ENTMCNC: 32.4 GM/DL — SIGNIFICANT CHANGE UP (ref 32–36)
MCV RBC AUTO: 90.2 FL — SIGNIFICANT CHANGE UP (ref 80–100)
MONOCYTES # BLD AUTO: 0.37 K/UL — SIGNIFICANT CHANGE UP (ref 0–0.9)
MONOCYTES NFR BLD AUTO: 6 % — SIGNIFICANT CHANGE UP (ref 2–14)
MRSA PCR RESULT.: SIGNIFICANT CHANGE UP
NEUTROPHILS # BLD AUTO: 4.83 K/UL — SIGNIFICANT CHANGE UP (ref 1.8–7.4)
NEUTROPHILS NFR BLD AUTO: 78.2 % — HIGH (ref 43–77)
NRBC # BLD: 0 /100 WBCS — SIGNIFICANT CHANGE UP (ref 0–0)
PLATELET # BLD AUTO: 190 K/UL — SIGNIFICANT CHANGE UP (ref 150–400)
POTASSIUM SERPL-MCNC: 3.9 MMOL/L — SIGNIFICANT CHANGE UP (ref 3.5–5.3)
POTASSIUM SERPL-SCNC: 3.9 MMOL/L — SIGNIFICANT CHANGE UP (ref 3.5–5.3)
PROT SERPL-MCNC: 7.1 G/DL — SIGNIFICANT CHANGE UP (ref 6–8.3)
RBC # BLD: 3.56 M/UL — LOW (ref 4.2–5.8)
RBC # FLD: 13.1 % — SIGNIFICANT CHANGE UP (ref 10.3–14.5)
S AUREUS DNA NOSE QL NAA+PROBE: SIGNIFICANT CHANGE UP
SODIUM SERPL-SCNC: 138 MMOL/L — SIGNIFICANT CHANGE UP (ref 135–145)
WBC # BLD: 6.18 K/UL — SIGNIFICANT CHANGE UP (ref 3.8–10.5)
WBC # FLD AUTO: 6.18 K/UL — SIGNIFICANT CHANGE UP (ref 3.8–10.5)

## 2021-09-20 PROCEDURE — 99233 SBSQ HOSP IP/OBS HIGH 50: CPT

## 2021-09-20 PROCEDURE — 99232 SBSQ HOSP IP/OBS MODERATE 35: CPT

## 2021-09-20 PROCEDURE — 93010 ELECTROCARDIOGRAM REPORT: CPT

## 2021-09-20 RX ORDER — INSULIN GLARGINE 100 [IU]/ML
5 INJECTION, SOLUTION SUBCUTANEOUS AT BEDTIME
Refills: 0 | Status: DISCONTINUED | OUTPATIENT
Start: 2021-09-20 | End: 2021-09-22

## 2021-09-20 RX ADMIN — LISINOPRIL 10 MILLIGRAM(S): 2.5 TABLET ORAL at 06:06

## 2021-09-20 RX ADMIN — LIDOCAINE 1 APPLICATION(S): 4 CREAM TOPICAL at 14:30

## 2021-09-20 RX ADMIN — Medication 1000 MILLIGRAM(S): at 08:32

## 2021-09-20 RX ADMIN — AMPICILLIN SODIUM AND SULBACTAM SODIUM 200 GRAM(S): 250; 125 INJECTION, POWDER, FOR SUSPENSION INTRAMUSCULAR; INTRAVENOUS at 17:32

## 2021-09-20 RX ADMIN — ATORVASTATIN CALCIUM 80 MILLIGRAM(S): 80 TABLET, FILM COATED ORAL at 22:28

## 2021-09-20 RX ADMIN — AMPICILLIN SODIUM AND SULBACTAM SODIUM 200 GRAM(S): 250; 125 INJECTION, POWDER, FOR SUSPENSION INTRAMUSCULAR; INTRAVENOUS at 03:06

## 2021-09-20 RX ADMIN — Medication 1000 MILLIGRAM(S): at 22:31

## 2021-09-20 RX ADMIN — GABAPENTIN 100 MILLIGRAM(S): 400 CAPSULE ORAL at 17:31

## 2021-09-20 RX ADMIN — OXYCODONE HYDROCHLORIDE 5 MILLIGRAM(S): 5 TABLET ORAL at 14:48

## 2021-09-20 RX ADMIN — Medication 1000 MILLIGRAM(S): at 14:28

## 2021-09-20 RX ADMIN — GABAPENTIN 100 MILLIGRAM(S): 400 CAPSULE ORAL at 06:15

## 2021-09-20 RX ADMIN — SODIUM CHLORIDE 60 MILLILITER(S): 9 INJECTION INTRAMUSCULAR; INTRAVENOUS; SUBCUTANEOUS at 06:05

## 2021-09-20 RX ADMIN — Medication 100 MILLIGRAM(S): at 06:06

## 2021-09-20 RX ADMIN — Medication 1000 MILLIGRAM(S): at 06:06

## 2021-09-20 RX ADMIN — Medication 60 MILLIGRAM(S): at 06:08

## 2021-09-20 RX ADMIN — AMPICILLIN SODIUM AND SULBACTAM SODIUM 200 GRAM(S): 250; 125 INJECTION, POWDER, FOR SUSPENSION INTRAMUSCULAR; INTRAVENOUS at 08:10

## 2021-09-20 RX ADMIN — OXYCODONE HYDROCHLORIDE 5 MILLIGRAM(S): 5 TABLET ORAL at 16:01

## 2021-09-20 RX ADMIN — OXYCODONE HYDROCHLORIDE 5 MILLIGRAM(S): 5 TABLET ORAL at 06:06

## 2021-09-20 RX ADMIN — OXYCODONE HYDROCHLORIDE 5 MILLIGRAM(S): 5 TABLET ORAL at 08:33

## 2021-09-20 RX ADMIN — HEPARIN SODIUM 5000 UNIT(S): 5000 INJECTION INTRAVENOUS; SUBCUTANEOUS at 22:28

## 2021-09-20 RX ADMIN — AMPICILLIN SODIUM AND SULBACTAM SODIUM 200 GRAM(S): 250; 125 INJECTION, POWDER, FOR SUSPENSION INTRAMUSCULAR; INTRAVENOUS at 11:58

## 2021-09-20 RX ADMIN — HEPARIN SODIUM 5000 UNIT(S): 5000 INJECTION INTRAVENOUS; SUBCUTANEOUS at 14:29

## 2021-09-20 RX ADMIN — Medication 1000 MILLIGRAM(S): at 15:00

## 2021-09-20 NOTE — PROGRESS NOTE ADULT - PROBLEM SELECTOR PLAN 1
as a consequence of poorly controlled diabetes   continue with Unasyn, Levofloxacin  afebrile,  no leukocytosis  plan for OR for wound debridement Wednesday  local wound care as per podiatry   ESR, CRP elevated   Dr. Wilkins consulted for cardiac clearance  ID: Dr. Barrett  Podiatry onboard as a consequence of poorly controlled diabetes   continue with Unasyn, Levofloxacin  afebrile,  no leukocytosis  plan for OR for wound debridement Wednesday  local wound care as per podiatry   ESR, CRP elevated   Espinoza Perioperative Risk Score 2.1%  Dr. Wilkins consulted for cardiac clearance  pain management following  ID: Dr. Barrett  Podiatry onboard

## 2021-09-20 NOTE — PROGRESS NOTE ADULT - ASSESSMENT
Calculator page.   This informational tool is a resource and is not meant for direct clinical application.   Patient Search   Multi-Patient Search   MME Calculator   Reports   Drug List   Designation   My CORY #  Data Detail Level: Printer-Friendly View Extended View  Confidential Drug Utilization Report  Search Terms: alfred villagran, 1945Search Date: 09/18/2021 13:46:54 PM  The Drug Utilization Report below displays all of the controlled substance prescriptions, if any, that your patient has filled in the last twelve months. The information displayed on this report is compiled from pharmacy submissions to the Department, and accurately reflects the information as submitted by the pharmacies.    This report was requested by: Minh Slater | Reference #: [id number]    There are no results for the search terms that you entered.

## 2021-09-20 NOTE — CONSULT NOTE ADULT - SUBJECTIVE AND OBJECTIVE BOX
C A R D I O L O G Y  *********************    DATE OF SERVICE: 09-20-21    HISTORY OF PRESENT ILLNESS: HPI:  76M from home, lives with daughter w/ PMH DM on insulin, HTN, HLD, normal lV fx moderate to severe AS 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 Pt with sharp pain on the R lateral foot. As per daughter, pt was taking tylenol which did not help his pain prompting the patient to ask his daughter to take him to the hospital. Pt is a poor historian. Daughter states that the patient did not see his podiatrist after the surgery. No fever, chest, pain, LOC, palpitations, nausea, vomiting, diarrhea (17 Sep 2021 01:11)      PAST MEDICAL & SURGICAL HISTORY:  HTN (hypertension)    HLD (hyperlipidemia)    DM (diabetes mellitus)    CAD (coronary artery disease)    Glaucoma, angle-closure    S/P CABG (coronary artery bypass graft)    History of thyroid surgery            MEDICATIONS:  MEDICATIONS  (STANDING):  acetaminophen   Tablet .. 1000 milliGRAM(s) Oral every 8 hours  ampicillin/sulbactam  IVPB      ampicillin/sulbactam  IVPB 3 Gram(s) IV Intermittent every 6 hours  aspirin  chewable 81 milliGRAM(s) Oral daily  atorvastatin 80 milliGRAM(s) Oral at bedtime  bisacodyl Suppository 10 milliGRAM(s) Rectal once  cyanocobalamin 1000 MICROGram(s) Oral daily  ergocalciferol 96859 Unit(s) Oral <User Schedule>  ferrous    sulfate 325 milliGRAM(s) Oral daily  gabapentin 100 milliGRAM(s) Oral every 12 hours  heparin   Injectable 5000 Unit(s) SubCutaneous every 8 hours  influenza   Vaccine 0.5 milliLiter(s) IntraMuscular once  insulin glargine Injectable (LANTUS) 5 Unit(s) SubCutaneous at bedtime  insulin lispro (ADMELOG) corrective regimen sliding scale   SubCutaneous Before meals and at bedtime  levoFLOXacin IVPB 250 milliGRAM(s) IV Intermittent every 24 hours  levoFLOXacin IVPB      lidocaine 5% Ointment 1 Application(s) Topical daily  lisinopril 10 milliGRAM(s) Oral daily  metoprolol succinate  milliGRAM(s) Oral daily  NIFEdipine XL 60 milliGRAM(s) Oral daily  polyethylene glycol 3350 17 Gram(s) Oral daily  senna 2 Tablet(s) Oral at bedtime      Allergies    No Known Allergies    Intolerances        FAMILY HISTORY:  Family history of acute myocardial infarction (Father)    Family history of diabetes mellitus (Mother, Sibling)    Family history of hypertension (Mother, Sibling)    Family history of hyperlipidemia (Mother, Sibling)      Non-contributary for premature coronary disease or sudden cardiac death    SOCIAL HISTORY:    [x ] Non-smoker  [ ] Smoker  [ ] Alcohol        REVIEW OF SYSTEMS:  [ ]chest pain  [  ]shortness of breath  [  ]palpitations  [  ]syncope  [ ]near syncope [ ]upper extremity weakness   [ ] lower extremity weakness  [  ]diplopia  [  ]altered mental status   [  ]fevers  [ ]chills [ ]nausea  [ ]vomitting  [  ]dysphagia    [ ]abdominal pain  [ ]melena  [ ]BRBPR    [  ]epistaxis  [  ]rash    [ ]lower extremity edema        [X] All others negative	  [ ] Unable to obtain      LABS:	 	    CARDIAC MARKERS:                              10.4   6.18  )-----------( 190      ( 20 Sep 2021 07:07 )             32.1     Hb Trend: 10.4<--    09-20    138  |  109<H>  |  16  ----------------------------<  69<L>  3.9   |  18<L>  |  1.48<H>    Ca    8.7      20 Sep 2021 07:07    TPro  7.1  /  Alb  2.6<L>  /  TBili  0.5  /  DBili  x   /  AST  11  /  ALT  12  /  AlkPhos  103  09-20    Creatinine Trend: 1.48<--, 1.85<--, 1.48<--, 1.44<--, 1.70<--, 1.18<--    PHYSICAL EXAM:  T(C): 37.1 (09-20-21 @ 05:00), Max: 37.3 (09-19-21 @ 14:16)  HR: 81 (09-20-21 @ 05:00) (70 - 81)  BP: 129/54 (09-20-21 @ 05:00) (129/54 - 136/64)  RR: 18 (09-20-21 @ 05:00) (18 - 18)  SpO2: 100% (09-20-21 @ 05:00) (98% - 100%)  Wt(kg): --   BMI (kg/m2): 27.9 (09-17-21 @ 21:58)  I&O's Summary      HEENT:  (-)icterus (-)pallor  CV: N S1 S2 1/6 TRINIDAD (+)2 Pulses B/l  Resp:  Clear to ausculatation B/L, normal effort  GI: (+) BS Soft, NT, ND  Lymph:  (-)Edema, (-)obvious lymphadenopathy  Skin: Warm to touch, Normal turgor  Psych: Appropriate mood and affect        ECG:  	PENDING         ASSESSMENT/PLAN: 	76y Male PMH DM on insulin, HTN, HLD, normal lV fx moderate to severe AS 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    - Check EKG not in chart  - Would treat as high cardiac risk given potential severe AS.  However HE is not in clinical CHF and has tolerated a similar podiatric procedure on his last admission  - Abx per primary team  - will need SABIHA for eval of AS as an outpt with His cardiologist at [facility]     I once again thank you for allowing me to participate in the care of your patient.  If you have any questions or concerns please do not hesitate to contact me.    Zak Wilkins MD, [facility]  BEEPER [phone]

## 2021-09-20 NOTE — PROGRESS NOTE ADULT - SUBJECTIVE AND OBJECTIVE BOX
C A R D I O L O G Y  *********************    DATE OF SERVICE: 09-20-21    HISTORY OF PRESENT ILLNESS: HPI:  76M from home, lives with daughter w/ PMH DM on insulin, HTN, HLD, normal lV fx moderate to severe AS 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 Pt with sharp pain on the R lateral foot. As per daughter, pt was taking tylenol which did not help his pain prompting the patient to ask his daughter to take him to the hospital. Pt is a poor historian. Daughter states that the patient did not see his podiatrist after the surgery. No fever, chest, pain, LOC, palpitations, nausea, vomiting, diarrhea (17 Sep 2021 01:11)      PAST MEDICAL & SURGICAL HISTORY:  HTN (hypertension)    HLD (hyperlipidemia)    DM (diabetes mellitus)    CAD (coronary artery disease)    Glaucoma, angle-closure    S/P CABG (coronary artery bypass graft)    History of thyroid surgery            MEDICATIONS:  MEDICATIONS  (STANDING):  acetaminophen   Tablet .. 1000 milliGRAM(s) Oral every 8 hours  ampicillin/sulbactam  IVPB      ampicillin/sulbactam  IVPB 3 Gram(s) IV Intermittent every 6 hours  aspirin  chewable 81 milliGRAM(s) Oral daily  atorvastatin 80 milliGRAM(s) Oral at bedtime  bisacodyl Suppository 10 milliGRAM(s) Rectal once  cyanocobalamin 1000 MICROGram(s) Oral daily  ergocalciferol 82138 Unit(s) Oral <User Schedule>  ferrous    sulfate 325 milliGRAM(s) Oral daily  gabapentin 100 milliGRAM(s) Oral every 12 hours  heparin   Injectable 5000 Unit(s) SubCutaneous every 8 hours  influenza   Vaccine 0.5 milliLiter(s) IntraMuscular once  insulin glargine Injectable (LANTUS) 5 Unit(s) SubCutaneous at bedtime  insulin lispro (ADMELOG) corrective regimen sliding scale   SubCutaneous Before meals and at bedtime  levoFLOXacin IVPB 250 milliGRAM(s) IV Intermittent every 24 hours  levoFLOXacin IVPB      lidocaine 5% Ointment 1 Application(s) Topical daily  lisinopril 10 milliGRAM(s) Oral daily  metoprolol succinate  milliGRAM(s) Oral daily  NIFEdipine XL 60 milliGRAM(s) Oral daily  polyethylene glycol 3350 17 Gram(s) Oral daily  senna 2 Tablet(s) Oral at bedtime      Allergies    No Known Allergies    Intolerances        FAMILY HISTORY:  Family history of acute myocardial infarction (Father)    Family history of diabetes mellitus (Mother, Sibling)    Family history of hypertension (Mother, Sibling)    Family history of hyperlipidemia (Mother, Sibling)      Non-contributary for premature coronary disease or sudden cardiac death    SOCIAL HISTORY:    [x ] Non-smoker  [ ] Smoker  [ ] Alcohol        REVIEW OF SYSTEMS:  [ ]chest pain  [  ]shortness of breath  [  ]palpitations  [  ]syncope  [ ]near syncope [ ]upper extremity weakness   [ ] lower extremity weakness  [  ]diplopia  [  ]altered mental status   [  ]fevers  [ ]chills [ ]nausea  [ ]vomitting  [  ]dysphagia    [ ]abdominal pain  [ ]melena  [ ]BRBPR    [  ]epistaxis  [  ]rash    [ ]lower extremity edema        [X] All others negative	  [ ] Unable to obtain      LABS:	 	    CARDIAC MARKERS:                              10.4   6.18  )-----------( 190      ( 20 Sep 2021 07:07 )             32.1     Hb Trend: 10.4<--    09-20    138  |  109<H>  |  16  ----------------------------<  69<L>  3.9   |  18<L>  |  1.48<H>    Ca    8.7      20 Sep 2021 07:07    TPro  7.1  /  Alb  2.6<L>  /  TBili  0.5  /  DBili  x   /  AST  11  /  ALT  12  /  AlkPhos  103  09-20    Creatinine Trend: 1.48<--, 1.85<--, 1.48<--, 1.44<--, 1.70<--, 1.18<--    PHYSICAL EXAM:  T(C): 37.1 (09-20-21 @ 05:00), Max: 37.3 (09-19-21 @ 14:16)  HR: 81 (09-20-21 @ 05:00) (70 - 81)  BP: 129/54 (09-20-21 @ 05:00) (129/54 - 136/64)  RR: 18 (09-20-21 @ 05:00) (18 - 18)  SpO2: 100% (09-20-21 @ 05:00) (98% - 100%)  Wt(kg): --   BMI (kg/m2): 27.9 (09-17-21 @ 21:58)  I&O's Summary      HEENT:  (-)icterus (-)pallor  CV: N S1 S2 1/6 TRINIDAD (+)2 Pulses B/l  Resp:  Clear to ausculatation B/L, normal effort  GI: (+) BS Soft, NT, ND  Lymph:  (-)Edema, (-)obvious lymphadenopathy  Skin: Warm to touch, Normal turgor  Psych: Appropriate mood and affect        ECG:  	PENDING         ASSESSMENT/PLAN: 	76y Male PMH DM on insulin, HTN, HLD, normal lV fx moderate to severe AS 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    - Check EKG not in chart  - Would treat as high cardiac risk given potential severe AS.  However HE is not in clinical CHF and has tolerated a similar podiatric procedure on his last admission  - Abx per primary team  - will need SABIHA for eval of AS as an outpt with His cardiologist at [facility]     I once again thank you for allowing me to participate in the care of your patient.  If you have any questions or concerns please do not hesitate to contact me.    Zak Wilkins MD, [facility]  BEEPER [phone]

## 2021-09-20 NOTE — PROGRESS NOTE ADULT - SUBJECTIVE AND OBJECTIVE BOX
Podiatry Interval HPI: Pt seen bedside AAOx3. Pt endorses mild pain to R 5th surgical site. Denies constitutional symptoms. States if consent needed for anything to please call his daughter Korin telephone number: [phone]. No other pedal complaints.     Podiatry HPI: 76 year old male with a PMHx of DM, HTN, HLD, CAD to ED presents to the ED for a Right Foot s/p 5th foot partial ray resection and fillet toe flap. Patient states that he has sharp localized non-radiating pain to the Right foot 5th metatarsal. States that after his surgery, he did not see a podiatrist and he came into the ED because he saw drainage and was having pain. Denies any constitutional symptoms of N/V/C/F/SOB. Denies any other pedal complaints at this time. Denies calf pain today, bilaterally.      Medications acetaminophen   Tablet .. 1000 milliGRAM(s) Oral every 8 hours  ampicillin/sulbactam  IVPB      ampicillin/sulbactam  IVPB 3 Gram(s) IV Intermittent every 6 hours  aspirin  chewable 81 milliGRAM(s) Oral daily  atorvastatin 80 milliGRAM(s) Oral at bedtime  bisacodyl Suppository 10 milliGRAM(s) Rectal once  cyanocobalamin 1000 MICROGram(s) Oral daily  ergocalciferol 84532 Unit(s) Oral <User Schedule>  ferrous    sulfate 325 milliGRAM(s) Oral daily  gabapentin 100 milliGRAM(s) Oral every 12 hours  heparin   Injectable 5000 Unit(s) SubCutaneous every 8 hours  influenza   Vaccine 0.5 milliLiter(s) IntraMuscular once  insulin glargine Injectable (LANTUS) 5 Unit(s) SubCutaneous at bedtime  insulin lispro (ADMELOG) corrective regimen sliding scale   SubCutaneous Before meals and at bedtime  levoFLOXacin IVPB 250 milliGRAM(s) IV Intermittent every 24 hours  levoFLOXacin IVPB      lidocaine 5% Ointment 1 Application(s) Topical daily  lisinopril 10 milliGRAM(s) Oral daily  metoprolol succinate  milliGRAM(s) Oral daily  morphine  - Injectable 2 milliGRAM(s) IV Push daily PRN  NIFEdipine XL 60 milliGRAM(s) Oral daily  oxyCODONE    IR 5 milliGRAM(s) Oral every 4 hours PRN  polyethylene glycol 3350 17 Gram(s) Oral daily  senna 2 Tablet(s) Oral at bedtime    FHFamily history of acute myocardial infarction (Father)    Family history of diabetes mellitus (Mother, Sibling)    Family history of hypertension (Mother, Sibling)    Family history of hyperlipidemia (Mother, Sibling)    ,   PMHHTN (hypertension)    HLD (hyperlipidemia)    DM (diabetes mellitus)    CAD (coronary artery disease)    Glaucoma, angle-closure       PSHNo significant past surgical history    S/P CABG (coronary artery bypass graft)    History of thyroid surgery        Labs                          10.4   6.18  )-----------( 190      ( 20 Sep 2021 07:07 )             32.1      09-20    138  |  109<H>  |  16  ----------------------------<  69<L>  3.9   |  18<L>  |  1.48<H>    Ca    8.7      20 Sep 2021 07:07    TPro  7.1  /  Alb  2.6<L>  /  TBili  0.5  /  DBili  x   /  AST  11  /  ALT  12  /  AlkPhos  103  09-20     Vital Signs Last 24 Hrs  T(C): 37.1 (20 Sep 2021 05:00), Max: 37.3 (19 Sep 2021 14:16)  T(F): 98.7 (20 Sep 2021 05:00), Max: 99.2 (19 Sep 2021 21:15)  HR: 81 (20 Sep 2021 05:00) (70 - 81)  BP: 129/54 (20 Sep 2021 05:00) (129/54 - 136/64)  BP(mean): --  RR: 18 (20 Sep 2021 05:00) (18 - 18)  SpO2: 100% (20 Sep 2021 05:00) (98% - 100%)  Sedimentation Rate, Erythrocyte: 77 mm/Hr (09-16-21 @ 16:03)       C-Reactive Protein, Serum: 45 mg/L (09-16-21 @ 23:33)  WBC Count: 6.18 K/uL (09-20-21 @ 07:07)      PHYSICAL EXAM  GEN: SHER ROBERT is a pleasant well-nourished, well developed 76y Male in no acute distress, alert awake, and oriented to person, place and time.   LE Focused:    Vasc:  DP/PT pulses were faintly palpable, bilateral. DP/PT pulses were monophasic on doppler, bilaterally. CFT<3 seconds to all digits.   Derm: Surgical Incision site dehiscence noted to the lateral aspect of the right foot approx (7.5cm x 3.5cm x 2.5cm), tunnels to the proximal aspect to the bone, +PTB, hyperpigmentation surrounding the wound, erythematous when compared to the contralateral side, no drainage. No malodor noted. Fibrogranular wound base noted. No streaking noted.   Neuro: Protective sensation diminished, b/l  MSK: +5/5 muscle strength noted to the pedal compartments. 5th digit amputation on right foot, noted.     Imaging:  INTERPRETATION:  Postop, rule out infection.    3 views right foot. Prior 8/20/2021.     Status post resection of the fifth toe and distal fifth metatarsal unchanged in appearance. No focal bone lysis or unusual periosteal reaction to suggest osteomyelitis. No acute fracture or dislocation. The remainder study unchanged. No soft tissue gas.    IMPRESSION: No acute finding. No plain film evidence of osteomyelitis.        MRI R foot:     INTERPRETATION:  Clinical Information: Recent fifth metatarsal head resection now with fifth digit pain, swelling and foul discharge.    Comparison: Radiographs of the right foot from 9/16/2021 and MRI the right foot from 8/16/2021.    Technique:  MRI of the right midfoot and forefoot.  Intravenous Contrast: None.    Findings:    There is a resection at the mid aspect of the fifth metatarsal shaft. There is a lateral soft tissue wound beginning at the level of the amputation which extends distally. There is susceptibility artifact in the region of the amputation consistent with postoperative change. There is hyperintense T2 marrow signal throughout the remaining fifth metatarsal and within the adjacent fourth metatarsal head which is nonspecific and could be related to recent postoperative changes although osteomyelitis is suspected.    There is edema and mild atrophy within the plantar muscles of the foot. There is minimal spurring at the first metatarsophalangeal and hallux sesamoid articulations.    Impression:  Resection at the mid aspect of the fifth metatarsal. Lateral soft tissue wound with marrow signal abnormality throughout the fifth metatarsal and within the adjacent fourth metatarsal head which is nonspecific in the setting of recent surgery although osteomyelitis is suspected.      Culture Results:   Rare Aeromonas hydrophila/caviae   Few Klebsiella oxytoca/Raoutella ornithinolytica   Few Enterococcus faecalis   Few Streptococcus agalactiae (Group B) isolated   Group B streptococci are susceptible to ampicillin,   penicillin and cefazolin, but may be resistant to   erythromycin and clindamycin.   Recommendations for intrapartum prophylaxis for Group B   streptococci are penicillin or ampicillin. (09.16.21 @ 21:42)

## 2021-09-20 NOTE — PROGRESS NOTE ADULT - PROBLEM SELECTOR PLAN 4
OREN superimposed on Stage III CKD --> creatinine improving, likely pre-renal   avoid nephrotoxic agents, dose as per GFR  monitor creatinine, electrolytes

## 2021-09-20 NOTE — PROGRESS NOTE ADULT - SUBJECTIVE AND OBJECTIVE BOX
NP Note discussed with  Primary Attending    Patient is a 76y old  Male who presents with a chief complaint of R Foot Pain (20 Sep 2021 12:56)      INTERVAL HPI/OVERNIGHT EVENTS: no acute events overnight,  hemodynamically stable     MEDICATIONS  (STANDING):  acetaminophen   Tablet .. 1000 milliGRAM(s) Oral every 8 hours  ampicillin/sulbactam  IVPB      ampicillin/sulbactam  IVPB 3 Gram(s) IV Intermittent every 6 hours  aspirin  chewable 81 milliGRAM(s) Oral daily  atorvastatin 80 milliGRAM(s) Oral at bedtime  bisacodyl Suppository 10 milliGRAM(s) Rectal once  cyanocobalamin 1000 MICROGram(s) Oral daily  ergocalciferol 89263 Unit(s) Oral <User Schedule>  ferrous    sulfate 325 milliGRAM(s) Oral daily  gabapentin 100 milliGRAM(s) Oral every 12 hours  heparin   Injectable 5000 Unit(s) SubCutaneous every 8 hours  influenza   Vaccine 0.5 milliLiter(s) IntraMuscular once  insulin glargine Injectable (LANTUS) 5 Unit(s) SubCutaneous at bedtime  insulin lispro (ADMELOG) corrective regimen sliding scale   SubCutaneous Before meals and at bedtime  levoFLOXacin IVPB 250 milliGRAM(s) IV Intermittent every 24 hours  levoFLOXacin IVPB      lidocaine 5% Ointment 1 Application(s) Topical daily  lisinopril 10 milliGRAM(s) Oral daily  metoprolol succinate  milliGRAM(s) Oral daily  NIFEdipine XL 60 milliGRAM(s) Oral daily  polyethylene glycol 3350 17 Gram(s) Oral daily  senna 2 Tablet(s) Oral at bedtime    MEDICATIONS  (PRN):  morphine  - Injectable 2 milliGRAM(s) IV Push daily PRN Severe Pain (7 - 10)  oxyCODONE    IR 5 milliGRAM(s) Oral every 4 hours PRN Severe Pain (7 - 10)      __________________________________________________  REVIEW OF SYSTEMS:    CONSTITUTIONAL: No fever,   EYES: no acute visual disturbances  NECK: No pain or stiffness  RESPIRATORY: No cough; No shortness of breath  CARDIOVASCULAR: No chest pain, no palpitations  GASTROINTESTINAL: No pain. No nausea or vomiting; No diarrhea   NEUROLOGICAL: No headache or numbness, no tremors  MUSCULOSKELETAL: No joint pain, no muscle pain  GENITOURINARY: no dysuria, no frequency, no hesitancy  PSYCHIATRY: no depression , no anxiety  ALL OTHER  ROS negative        Vital Signs Last 24 Hrs  T(C): 37.1 (20 Sep 2021 05:00), Max: 37.3 (19 Sep 2021 14:16)  T(F): 98.7 (20 Sep 2021 05:00), Max: 99.2 (19 Sep 2021 21:15)  HR: 81 (20 Sep 2021 05:00) (70 - 81)  BP: 129/54 (20 Sep 2021 05:00) (129/54 - 136/64)  BP(mean): --  RR: 18 (20 Sep 2021 05:00) (18 - 18)  SpO2: 100% (20 Sep 2021 05:00) (98% - 100%)    ________________________________________________  PHYSICAL EXAM:  GENERAL: NAD  HEENT: Normocephalic;  conjunctivae and sclerae clear; moist mucous membranes;   NECK : supple  CHEST/LUNG: Clear to auscultation bilaterally with good air entry   HEART: S1 S2  regular; no murmurs, gallops or rubs  ABDOMEN: Soft, Nontender, Nondistended; Bowel sounds present  EXTREMITIES: no cyanosis; no edema; no calf tenderness  SKIN: warm and dry; no rash  NERVOUS SYSTEM:  Awake and alert; Oriented  to place, person and time ; no new deficits    _________________________________________________  LABS:                        10.4   6.18  )-----------( 190      ( 20 Sep 2021 07:07 )             32.1     09-20    138  |  109<H>  |  16  ----------------------------<  69<L>  3.9   |  18<L>  |  1.48<H>    Ca    8.7      20 Sep 2021 07:07    TPro  7.1  /  Alb  2.6<L>  /  TBili  0.5  /  DBili  x   /  AST  11  /  ALT  12  /  AlkPhos  103  09-20        CAPILLARY BLOOD GLUCOSE      POCT Blood Glucose.: 75 mg/dL (20 Sep 2021 11:44)  POCT Blood Glucose.: 83 mg/dL (20 Sep 2021 08:18)  POCT Blood Glucose.: 79 mg/dL (20 Sep 2021 06:23)  POCT Blood Glucose.: 114 mg/dL (19 Sep 2021 21:08)  POCT Blood Glucose.: 125 mg/dL (19 Sep 2021 16:50)        RADIOLOGY & ADDITIONAL TESTS:    Imaging  Reviewed:  YES/NO    Consultant(s) Notes Reviewed:   YES/ No      Plan of care was discussed with patient and /or primary care giver; all questions and concerns were addressed  NP Note discussed with  Primary Attending    Patient is a 76y old  Male who presents with a chief complaint of R Foot Pain (20 Sep 2021 12:56)      INTERVAL HPI/OVERNIGHT EVENTS: no acute events overnight,  hemodynamically stable     MEDICATIONS  (STANDING):  acetaminophen   Tablet .. 1000 milliGRAM(s) Oral every 8 hours  ampicillin/sulbactam  IVPB      ampicillin/sulbactam  IVPB 3 Gram(s) IV Intermittent every 6 hours  aspirin  chewable 81 milliGRAM(s) Oral daily  atorvastatin 80 milliGRAM(s) Oral at bedtime  bisacodyl Suppository 10 milliGRAM(s) Rectal once  cyanocobalamin 1000 MICROGram(s) Oral daily  ergocalciferol 58368 Unit(s) Oral <User Schedule>  ferrous    sulfate 325 milliGRAM(s) Oral daily  gabapentin 100 milliGRAM(s) Oral every 12 hours  heparin   Injectable 5000 Unit(s) SubCutaneous every 8 hours  influenza   Vaccine 0.5 milliLiter(s) IntraMuscular once  insulin glargine Injectable (LANTUS) 5 Unit(s) SubCutaneous at bedtime  insulin lispro (ADMELOG) corrective regimen sliding scale   SubCutaneous Before meals and at bedtime  levoFLOXacin IVPB 250 milliGRAM(s) IV Intermittent every 24 hours  levoFLOXacin IVPB      lidocaine 5% Ointment 1 Application(s) Topical daily  lisinopril 10 milliGRAM(s) Oral daily  metoprolol succinate  milliGRAM(s) Oral daily  NIFEdipine XL 60 milliGRAM(s) Oral daily  polyethylene glycol 3350 17 Gram(s) Oral daily  senna 2 Tablet(s) Oral at bedtime    MEDICATIONS  (PRN):  morphine  - Injectable 2 milliGRAM(s) IV Push daily PRN Severe Pain (7 - 10)  oxyCODONE    IR 5 milliGRAM(s) Oral every 4 hours PRN Severe Pain (7 - 10)      __________________________________________________  REVIEW OF SYSTEMS:    CONSTITUTIONAL: No fever,   EYES: no acute visual disturbances  NECK: No pain or stiffness  RESPIRATORY: No cough; No shortness of breath  CARDIOVASCULAR: No chest pain, no palpitations  GASTROINTESTINAL: No pain. No nausea or vomiting; No diarrhea   NEUROLOGICAL: No headache or numbness, no tremors  MUSCULOSKELETAL: No joint pain, no muscle pain  GENITOURINARY: no dysuria, no frequency, no hesitancy  PSYCHIATRY: no depression , no anxiety  ALL OTHER  ROS negative        Vital Signs Last 24 Hrs  T(C): 37.1 (20 Sep 2021 05:00), Max: 37.3 (19 Sep 2021 14:16)  T(F): 98.7 (20 Sep 2021 05:00), Max: 99.2 (19 Sep 2021 21:15)  HR: 81 (20 Sep 2021 05:00) (70 - 81)  BP: 129/54 (20 Sep 2021 05:00) (129/54 - 136/64)  BP(mean): --  RR: 18 (20 Sep 2021 05:00) (18 - 18)  SpO2: 100% (20 Sep 2021 05:00) (98% - 100%)    ________________________________________________  PHYSICAL EXAM:  GENERAL: NAD  HEENT: Normocephalic;  conjunctivae and sclerae clear; moist mucous membranes;   NECK : supple  CHEST/LUNG: Clear to auscultation bilaterally with good air entry   HEART: S1 S2  regular; no murmurs, gallops or rubs  ABDOMEN: Soft, Nontender, Nondistended; Bowel sounds present  EXTREMITIES: right partial ray amputation with + purulent d/c   SKIN: warm and dry; no rash  NERVOUS SYSTEM:  Awake and alert; Oriented  to place, person and time ; no new deficits    _________________________________________________  LABS:                        10.4   6.18  )-----------( 190      ( 20 Sep 2021 07:07 )             32.1     09-20    138  |  109<H>  |  16  ----------------------------<  69<L>  3.9   |  18<L>  |  1.48<H>    Ca    8.7      20 Sep 2021 07:07    TPro  7.1  /  Alb  2.6<L>  /  TBili  0.5  /  DBili  x   /  AST  11  /  ALT  12  /  AlkPhos  103  09-20        CAPILLARY BLOOD GLUCOSE      POCT Blood Glucose.: 75 mg/dL (20 Sep 2021 11:44)  POCT Blood Glucose.: 83 mg/dL (20 Sep 2021 08:18)  POCT Blood Glucose.: 79 mg/dL (20 Sep 2021 06:23)  POCT Blood Glucose.: 114 mg/dL (19 Sep 2021 21:08)  POCT Blood Glucose.: 125 mg/dL (19 Sep 2021 16:50)        RADIOLOGY & ADDITIONAL TESTS:    Imaging  Reviewed:  YES  < from: MR Foot No Cont, Right (09.17.21 @ 13:04) >  Impression:  Resection at the mid aspect of the fifth metatarsal. Lateral soft tissue wound with marrow signal abnormality throughout the fifth metatarsal and within the adjacent fourth metatarsal head which is nonspecific in the setting of recent surgery although osteomyelitis is suspected.    < end of copied text >    Consultant(s) Notes Reviewed:   YES      Plan of care was discussed with patient and /or primary care giver; all questions and concerns were addressed

## 2021-09-20 NOTE — PROGRESS NOTE ADULT - ASSESSMENT
A:   Right 5th ray wound dehiscence  s/p R 5th ray resection - 8/19     P:  Patient evaluated, chart reviewed  Xrays reviewed  ESR/CRP reviewed   MRI reviewed   Removed the dressings and evaluated the wound  Applied Santly, DSD, ACE    Possible OR wound debridement, graft application, with wound vac application wed at 12pm  Requesting medical optimization   Appreciate ID consult   Podiatry will follow while in house  Discussed and seen with    A:   Right 5th ray wound dehiscence  s/p R 5th ray resection - 8/19     P:  Patient evaluated, chart reviewed  Xrays reviewed  ESR/CRP reviewed   MRI reviewed   Removed the dressings and evaluated the wound  Applied Santly, DSD, ACE    OR wound debridement, somagen graft application, with wound vac application & bone biopsy wed 9/22 at 12pm  Requesting medical optimization   Appreciate ID consult   Podiatry will follow while in house  Discussed and seen with

## 2021-09-20 NOTE — PROGRESS NOTE ADULT - PROBLEM SELECTOR PLAN 3
CAD s/p CABG, severe aortic stenosis --> patient will need TAVR, SABIHA after infectious workup is complete  continue ASA, lipitor, BB, ACEi  EF 55-60%, GIDD   Cardio: Dr. Wilkins

## 2021-09-20 NOTE — PROGRESS NOTE ADULT - ASSESSMENT
76 year old male with past medical history of IDDM, HTN, HLD, CAD and recent right partial 5th ray amputation (8/19)/21) who presented to ED with c/o right foot pain associated with discharge and foul odor. He tried taking Tylenol for the pain with minimal relief. Of note, the patient never followed up with podiatry after his procedure in August. MRI confirms suspected osteomyelitis - patient followed by podiatry and ID, plan for OR on Wednesday for debridement and possible VAC placement. Patient currently on course of Unasyn and Levofloxacin.       >>INCOMPLETE<< 76 year old male with past medical history of IDDM, HTN, HLD, CAD s/p CABG and recent right partial 5th ray amputation (8/19)/21) who presented to ED with c/o right foot pain associated with discharge and foul odor. He tried taking Tylenol for the pain with minimal relief. Of note, the patient never followed up with podiatry after his procedure in August. MRI confirms suspected osteomyelitis - patient followed by podiatry and ID, plan for OR on Wednesday for debridement and possible VAC placement. Patient currently on course of Unasyn and Levofloxacin.

## 2021-09-20 NOTE — PROGRESS NOTE ADULT - SUBJECTIVE AND OBJECTIVE BOX
Patient is seen and examined at the bed side, is afebrile. The  kidney function is improving.        REVIEW OF SYSTEMS: All other review systems are negative      ALLERGIES: No Known Allergies      Vital Signs Last 24 Hrs  T(C): 36.6 (20 Sep 2021 14:12), Max: 37.3 (19 Sep 2021 21:15)  T(F): 97.8 (20 Sep 2021 14:12), Max: 99.2 (19 Sep 2021 21:15)  HR: 76 (20 Sep 2021 14:12) (70 - 81)  BP: 152/56 (20 Sep 2021 14:12) (129/54 - 152/56)  BP(mean): --  RR: 18 (20 Sep 2021 14:12) (18 - 18)  SpO2: 100% (20 Sep 2021 14:12) (100% - 100%)      PHYSICAL EXAM:  GENERAL: Not in distress   CHEST/LUNG:  Not using accessory muscles  HEART: s1 and s2 present  ABDOMEN:  Nontender and  Nondistended  EXTREMITIES: Right foot bandage in placed   CNS: Awake and Alert      LABS:                                   10.4   6.18  )-----------( 190      ( 20 Sep 2021 07:07 )             32.1                10.6   6.44  )-----------( 195      ( 19 Sep 2021 06:31 )             33.6         09-20    138  |  109<H>  |  16  ----------------------------<  69<L>  3.9   |  18<L>  |  1.48<H>    Ca    8.7      20 Sep 2021 07:07    TPro  7.1  /  Alb  2.6<L>  /  TBili  0.5  /  DBili  x   /  AST  11  /  ALT  12  /  AlkPhos  103  09-20    09-19    137  |  107  |  17  ----------------------------<  82  4.1   |  22  |  1.85<H>    Ca    9.1      19 Sep 2021 06:28    TPro  7.2  /  Alb  2.7<L>  /  TBili  0.6  /  DBili  x   /  AST  10  /  ALT  13  /  AlkPhos  97  09-19        CAPILLARY BLOOD GLUCOSE  POCT Blood Glucose.: 134 mg/dL (17 Sep 2021 17:11)  POCT Blood Glucose.: 128 mg/dL (17 Sep 2021 11:06)  POCT Blood Glucose.: 157 mg/dL (17 Sep 2021 04:10)      MEDICATIONS  (STANDING):    acetaminophen   Tablet .. 1000 milliGRAM(s) Oral every 8 hours  ampicillin/sulbactam  IVPB      ampicillin/sulbactam  IVPB 3 Gram(s) IV Intermittent every 6 hours  aspirin  chewable 81 milliGRAM(s) Oral daily  atorvastatin 80 milliGRAM(s) Oral at bedtime  bisacodyl Suppository 10 milliGRAM(s) Rectal once  cyanocobalamin 1000 MICROGram(s) Oral daily  ergocalciferol 42673 Unit(s) Oral <User Schedule>  ferrous    sulfate 325 milliGRAM(s) Oral daily  gabapentin 100 milliGRAM(s) Oral every 12 hours  heparin   Injectable 5000 Unit(s) SubCutaneous every 8 hours  influenza   Vaccine 0.5 milliLiter(s) IntraMuscular once  insulin glargine Injectable (LANTUS) 5 Unit(s) SubCutaneous at bedtime  insulin lispro (ADMELOG) corrective regimen sliding scale   SubCutaneous Before meals and at bedtime  levoFLOXacin IVPB      levoFLOXacin IVPB 250 milliGRAM(s) IV Intermittent every 24 hours  lidocaine 5% Ointment 1 Application(s) Topical daily  lisinopril 10 milliGRAM(s) Oral daily  metoprolol succinate  milliGRAM(s) Oral daily  NIFEdipine XL 60 milliGRAM(s) Oral daily  polyethylene glycol 3350 17 Gram(s) Oral daily  senna 2 Tablet(s) Oral at bedtime        RADIOLOGY & ADDITIONAL TESTS:    9/17/21 : MR Foot No Cont, Right (09.17.21 @ 13:04) : Resection at the mid aspect of the fifth metatarsal. Lateral soft tissue wound with marrow signal abnormality throughout the fifth metatarsal and within the adjacent fourth metatarsal head which is nonspecific in the setting of recent surgery although osteomyelitis is suspected.    9/16/21 : Xray Foot AP + Lateral + Oblique, Right (09.16.21 @ 15:03) : No acute finding. No plain film evidence of osteomyelitis.        MICROBIOLOGY DATA:    COVID-19 David Domain Antibody (09.18.21 @ 12:55)   COVID-19 David Domain Antibody Result: >250.00:    Culture - Blood (09.17.21 @ 10:28)   Specimen Source: .Blood Blood-Peripheral   Culture Results: No growth to date.     Culture - Blood (09.17.21 @ 10:28)   Specimen Source: .Blood Blood-Venous   Culture Results: No growth to date.     Culture - Surgical Swab (09.16.21 @ 21:42)   - Amikacin: S <=16   - Amikacin: S <=16   - Amoxicillin/Clavulanic Acid: S <=8/4   - Ampicillin: R >16 These ampicillin results predict results for amoxicillin   - Ampicillin: S <=2 Predicts results to ampicillin/sulbactam, amoxacillin-clavulanate and piperacillin-tazobactam.   - Ampicillin/Sulbactam: S 8/4 Enterobacter, Citrobacter, and Serratia may develop resistance during prolonged therapy (3-4 days)   - Ampicillin/Sulbactam: R >16/8   - Aztreonam: S <=4   - Aztreonam: S <=4   - Cefazolin: R 16 Enterobacter, Citrobacter, and Serratia may develop resistance during prolonged therapy (3-4 days)   - Cefazolin: R >16   - Cefepime: S <=2   - Cefepime: S <=2   - Cefoxitin: S <=8   - Cefoxitin: S <=8   - Ceftazidime: S <=1   - Ceftriaxone: S <=1   - Ceftriaxone: S <=1 Enterobacter, Citrobacter, and Serratia may develop resistance during prolonged therapy   - Ciprofloxacin: S <=0.25   - Ciprofloxacin: S <=0.25   - Ertapenem: S <=0.5   - Gentamicin: S <=2   - Gentamicin: S <=2   - Imipenem: S <=1   - Levofloxacin: S <=0.5   - Levofloxacin: S <=0.5   - Meropenem: S <=1   - Meropenem: S <=1   - Piperacillin/Tazobactam: S <=8   - Piperacillin/Tazobactam: S <=8   - Tetra/Doxy: S 4   - Tobramycin: S <=2   - Trimethoprim/Sulfamethoxazole: S <=0.5/9.5   - Trimethoprim/Sulfamethoxazole: S <=0.5/9.5   - Vancomycin: S 2   Specimen Source: .Surgical Swab right foot wound   Culture Results:   Culture yields >4 types of aerobic and/or anaerobic bacteria   Call client services within 7 days if further workup is clinically   indicated. Culture includes   Rare Aeromonas hydrophila/caviae   Few Klebsiella oxytoca/Raoutella ornithinolytica   Few Enterococcus faecalis   Few Streptococcus agalactiae (Group B) isolated   Group B streptococci are susceptible to ampicillin,   penicillin and cefazolin, but may be resistant to   erythromycin and clindamycin.   Recommendations for intrapartum prophylaxis for Group B   streptococci are penicillin or ampicillin.   Organism Identification: Aeromonas hydrophila/caviae   Klebsiella oxytoca /Raoutella ornithinolytica   Enterococcus faecalis   Organism: Aeromonas hydrophila/caviae   Organism: Klebsiella oxytoca /Raoutella ornithinolytica   Organism: Enterococcus faecalis   COVID-19 PCR . (09.16.21 @ 22:24)   COVID-19 PCR: NotDetec:

## 2021-09-20 NOTE — PROGRESS NOTE ADULT - PROBLEM SELECTOR PLAN 7
PT consulted, recommend [facility]EVAN  daughter would like discharge to [facility] as wife is also admitted there

## 2021-09-20 NOTE — PROGRESS NOTE ADULT - ATTENDING COMMENTS
Denies pain today. Right foot dressing c/d/i. 76 year old male with past medical history of IDDM, HTN, HLD, CAD s/p CABG with recent R foot ray amputation, presenting with right foot pain. MRI with suspected osteomyelitis. Patient followed by podiatry, plan to go to the OR on Wednesday for bone biopsy and skin graft. Patient is medically optimized, appreciate cardiology input for severe aortic stenosis. Cr labile, but likely within variation. Continue insulin for DM2, statin for HLD.

## 2021-09-20 NOTE — PROGRESS NOTE ADULT - ASSESSMENT
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  As per daughter, patient was taking tylenol which did not help his pain prompting the patient to ask his daughter to take him to the hospital. ON admission, he has no fever or Leukocytosis. The Xray of Right foot shows no Osteomyelitis but MRI of Right foot shows Lateral soft tissue wound with marrow signal abnormality throughout the fifth metatarsal which is consistent of Osteomyelitis. He has seen by Podiatry and wound culture has sent, Zosyn and Vancomycin has started. The ID consult requested to assist with further evaluation and antibiotic management.     # Right Fifth metatarsal DFU with drainage and Osteomyelitis- wound cx grew Enterococcus, Aeromonas and Klebsiella - Zosyn is the ideal to cover All organisms but kidney function is worsening, hence change to Unasyn and Levaquin until kidney function is improved     would recommend:    1. Continue Unasyn and Levaquin  to cover All Organisms grew In wound culture, until kidney function is improved   2. Monitor Kidney function and adjust Abx doses accordingly  3. Wound care as per Podiatry  4. OOB to chair  5. Pain management as needed    d/w patient and Nursing staff     Attending Attestation:    Spent more than 45 minutes on total encounter, more than 50 % of the visit was spent counseling and/or coordinating care by the Attending physician. 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  As per daughter, patient was taking tylenol which did not help his pain prompting the patient to ask his daughter to take him to the hospital. ON admission, he has no fever or Leukocytosis. The Xray of Right foot shows no Osteomyelitis but MRI of Right foot shows Lateral soft tissue wound with marrow signal abnormality throughout the fifth metatarsal which is consistent of Osteomyelitis. He has seen by Podiatry and wound culture has sent, Zosyn and Vancomycin has started. The ID consult requested to assist with further evaluation and antibiotic management.     # Right Fifth metatarsal DFU with drainage and Osteomyelitis- wound cx grew Enterococcus, Aeromonas and Klebsiella - Zosyn is the ideal to cover All organisms but kidney function is worsening, hence change to Unasyn and Levaquin until kidney function is improved     would recommend:    1. Continue Unasyn and Levaquin  to cover All Organisms grew In wound culture, until kidney function is improved   2. Monitor Kidney function and adjust Abx doses accordingly  3. Wound care as per Podiatry  4. OOB to chair  5. Pain management as needed    d/w patient and Nursing staff     Attending Attestation:    Spent more than 35 minutes on total encounter, more than 50 % of the visit was spent counseling and/or coordinating care by the Attending physician.

## 2021-09-20 NOTE — PROGRESS NOTE ADULT - PROBLEM SELECTOR PLAN 1
·  Recommendation: Pt with right foot pain due to resection at the mid aspect of the fifth metatarsal. Lateral soft tissue wound with marrow signal abnormality throughout the fifth metatarsal and within the adjacent fourth metatarsal head. Pt for OR today for I&D and wound vac application  nonopioid recc  - acetaminophen 1 gram po q 8 hours for 3 days  - gabapentin 100mg po q 12 hours - titrate upwards  - no nsaids - pt with suzanna  opioid recc  - can give morphine iv 2mg prior to dressing changes - will dc morphine in am  - oxycodone 5mg po q 4 hours prn  bowel regimen  - senna and miralax  podiatry followup. - continue iv abx

## 2021-09-20 NOTE — PROGRESS NOTE ADULT - SUBJECTIVE AND OBJECTIVE BOX
Source of information: , Chart review  Patient language: English  : n/a    CC:  right foot pain    This is a 76yMale patient who presents to the hospital for Patient is a 76y old  Male who presents with a chief complaint of R Foot Pain (20 Sep 2021 12:47)    Pt with right foot pain.  s/ right 5th toe partial ray amputation last month.  Pt now would wound dehiscence and for OR today.  Pain has been better controlled as pt takes oxycodone and started on gabapentin.  Will dc morphine postop.      PAIN SCORE:    5/10     SCALE USED: (1-10 NRS)      PAST MEDICAL & SURGICAL HISTORY:  HTN (hypertension)    HLD (hyperlipidemia)    DM (diabetes mellitus)    CAD (coronary artery disease)    Glaucoma, angle-closure    S/P CABG (coronary artery bypass graft)    History of thyroid surgery        FAMILY HISTORY:  Family history of acute myocardial infarction (Father)    Family history of diabetes mellitus (Mother, Sibling)    Family history of hypertension (Mother, Sibling)    Family history of hyperlipidemia (Mother, Sibling)          SOCIAL HISTORY:  [ x] Denies Smoking, Alcohol, or Drug Use    Allergies    No Known Allergies    Intolerances        MEDICATIONS:    MEDICATIONS  (STANDING):  acetaminophen   Tablet .. 1000 milliGRAM(s) Oral every 8 hours  ampicillin/sulbactam  IVPB      ampicillin/sulbactam  IVPB 3 Gram(s) IV Intermittent every 6 hours  aspirin  chewable 81 milliGRAM(s) Oral daily  atorvastatin 80 milliGRAM(s) Oral at bedtime  bisacodyl Suppository 10 milliGRAM(s) Rectal once  cyanocobalamin 1000 MICROGram(s) Oral daily  ergocalciferol 37459 Unit(s) Oral <User Schedule>  ferrous    sulfate 325 milliGRAM(s) Oral daily  gabapentin 100 milliGRAM(s) Oral every 12 hours  heparin   Injectable 5000 Unit(s) SubCutaneous every 8 hours  influenza   Vaccine 0.5 milliLiter(s) IntraMuscular once  insulin glargine Injectable (LANTUS) 5 Unit(s) SubCutaneous at bedtime  insulin lispro (ADMELOG) corrective regimen sliding scale   SubCutaneous Before meals and at bedtime  levoFLOXacin IVPB 250 milliGRAM(s) IV Intermittent every 24 hours  levoFLOXacin IVPB      lidocaine 5% Ointment 1 Application(s) Topical daily  lisinopril 10 milliGRAM(s) Oral daily  metoprolol succinate  milliGRAM(s) Oral daily  NIFEdipine XL 60 milliGRAM(s) Oral daily  polyethylene glycol 3350 17 Gram(s) Oral daily  senna 2 Tablet(s) Oral at bedtime    MEDICATIONS  (PRN):  morphine  - Injectable 2 milliGRAM(s) IV Push daily PRN Severe Pain (7 - 10)  oxyCODONE    IR 5 milliGRAM(s) Oral every 4 hours PRN Severe Pain (7 - 10)      Vital Signs Last 24 Hrs  T(C): 37.1 (20 Sep 2021 05:00), Max: 37.3 (19 Sep 2021 14:16)  T(F): 98.7 (20 Sep 2021 05:00), Max: 99.2 (19 Sep 2021 21:15)  HR: 81 (20 Sep 2021 05:00) (70 - 81)  BP: 129/54 (20 Sep 2021 05:00) (129/54 - 136/64)  BP(mean): --  RR: 18 (20 Sep 2021 05:00) (18 - 18)  SpO2: 100% (20 Sep 2021 05:00) (98% - 100%)    LABS:                          10.4   6.18  )-----------( 190      ( 20 Sep 2021 07:07 )             32.1     09-20    138  |  109<H>  |  16  ----------------------------<  69<L>  3.9   |  18<L>  |  1.48<H>    Ca    8.7      20 Sep 2021 07:07    TPro  7.1  /  Alb  2.6<L>  /  TBili  0.5  /  DBili  x   /  AST  11  /  ALT  12  /  AlkPhos  103  09-20      LIVER FUNCTIONS - ( 20 Sep 2021 07:07 )  Alb: 2.6 g/dL / Pro: 7.1 g/dL / ALK PHOS: 103 U/L / ALT: 12 U/L DA / AST: 11 U/L / GGT: x             CAPILLARY BLOOD GLUCOSE      POCT Blood Glucose.: 75 mg/dL (20 Sep 2021 11:44)  POCT Blood Glucose.: 83 mg/dL (20 Sep 2021 08:18)  POCT Blood Glucose.: 79 mg/dL (20 Sep 2021 06:23)  POCT Blood Glucose.: 114 mg/dL (19 Sep 2021 21:08)  POCT Blood Glucose.: 125 mg/dL (19 Sep 2021 16:50)      Radiology:    Drug Screen:        REVIEW OF SYSTEMS:  CONSTITUTIONAL: No fever or fatigue  RESPIRATORY: No cough, wheezing, chills or hemoptysis; No shortness of breath  CARDIOVASCULAR: No chest pain, palpitations, dizziness, or leg swelling  GASTROINTESTINAL: No abdominal or epigastric pain. No nausea, vomiting; No diarrhea or constipation.   GENITOURINARY: No dysuria, frequency, hematuria, retention or incontinence  MUSCULOSKELETAL: + right foot pain  - 5th digit   NEURO: No headaches, No numbness/tingling b/l LE, No weakness  PSYCHIATRIC: No depression, anxiety, mood swings, or difficulty sleeping    PHYSICAL EXAM:  GENERAL:  Alert & Oriented X3, NAD, Good concentration  CHEST/LUNG: Clear to auscultation bilaterally; No rales, rhonchi, wheezing, or rubs  HEART: Regular rate and rhythm; No murmurs, rubs, or gallops  ABDOMEN: Soft, Nontender, Nondistended; Bowel sounds present  EXTREMITIES:  2+ Peripheral Pulses, No cyanosis, or edema  MUSCULOSKELETAL: + decreased rom + right foot - 5th digit - + tenderness   SKIN: No rashes or lesions    Risk factors associated with adverse outcomes related to opioid treatment  [ ]  Concurrent benzodiazepine use  [ ]  History/ Active substance use or alcohol use disorder  [ ] Psychiatric co-morbidity  [ ] Sleep apnea  [ ] COPD  [ ] BMI> 35  [ ] Liver dysfunction  [ ] Renal dysfunction  [ ] CHF  [ ] Smoker  x[ ]  Age > 60 years    [ x]  [place]  Reviewed and Copied to Chart. See below.    Plan of care and goal oriented pain management treatment options were discussed with patient and /or primary care giver; all questions and concerns were addressed and care was aligned with patient's wishes.    Educated patient on goal oriented pain management treatment options     09-20-21 @ 12:57

## 2021-09-21 LAB
ANION GAP SERPL CALC-SCNC: 12 MMOL/L — SIGNIFICANT CHANGE UP (ref 5–17)
BUN SERPL-MCNC: 15 MG/DL — SIGNIFICANT CHANGE UP (ref 7–18)
CALCIUM SERPL-MCNC: 8.8 MG/DL — SIGNIFICANT CHANGE UP (ref 8.4–10.5)
CHLORIDE SERPL-SCNC: 109 MMOL/L — HIGH (ref 96–108)
CO2 SERPL-SCNC: 20 MMOL/L — LOW (ref 22–31)
CREAT SERPL-MCNC: 1.42 MG/DL — HIGH (ref 0.5–1.3)
GLUCOSE BLDC GLUCOMTR-MCNC: 122 MG/DL — HIGH (ref 70–99)
GLUCOSE BLDC GLUCOMTR-MCNC: 74 MG/DL — SIGNIFICANT CHANGE UP (ref 70–99)
GLUCOSE BLDC GLUCOMTR-MCNC: 77 MG/DL — SIGNIFICANT CHANGE UP (ref 70–99)
GLUCOSE BLDC GLUCOMTR-MCNC: 99 MG/DL — SIGNIFICANT CHANGE UP (ref 70–99)
GLUCOSE SERPL-MCNC: 69 MG/DL — LOW (ref 70–99)
HCT VFR BLD CALC: 31 % — LOW (ref 39–50)
HGB BLD-MCNC: 10.1 G/DL — LOW (ref 13–17)
MCHC RBC-ENTMCNC: 29.5 PG — SIGNIFICANT CHANGE UP (ref 27–34)
MCHC RBC-ENTMCNC: 32.6 GM/DL — SIGNIFICANT CHANGE UP (ref 32–36)
MCV RBC AUTO: 90.6 FL — SIGNIFICANT CHANGE UP (ref 80–100)
NRBC # BLD: 0 /100 WBCS — SIGNIFICANT CHANGE UP (ref 0–0)
PLATELET # BLD AUTO: 193 K/UL — SIGNIFICANT CHANGE UP (ref 150–400)
POTASSIUM SERPL-MCNC: 3.9 MMOL/L — SIGNIFICANT CHANGE UP (ref 3.5–5.3)
POTASSIUM SERPL-SCNC: 3.9 MMOL/L — SIGNIFICANT CHANGE UP (ref 3.5–5.3)
RBC # BLD: 3.42 M/UL — LOW (ref 4.2–5.8)
RBC # FLD: 13.3 % — SIGNIFICANT CHANGE UP (ref 10.3–14.5)
SARS-COV-2 RNA SPEC QL NAA+PROBE: SIGNIFICANT CHANGE UP
SODIUM SERPL-SCNC: 141 MMOL/L — SIGNIFICANT CHANGE UP (ref 135–145)
WBC # BLD: 5.54 K/UL — SIGNIFICANT CHANGE UP (ref 3.8–10.5)
WBC # FLD AUTO: 5.54 K/UL — SIGNIFICANT CHANGE UP (ref 3.8–10.5)

## 2021-09-21 PROCEDURE — 99233 SBSQ HOSP IP/OBS HIGH 50: CPT

## 2021-09-21 RX ADMIN — OXYCODONE HYDROCHLORIDE 5 MILLIGRAM(S): 5 TABLET ORAL at 12:02

## 2021-09-21 RX ADMIN — Medication 1000 MILLIGRAM(S): at 05:45

## 2021-09-21 RX ADMIN — OXYCODONE HYDROCHLORIDE 5 MILLIGRAM(S): 5 TABLET ORAL at 18:58

## 2021-09-21 RX ADMIN — AMPICILLIN SODIUM AND SULBACTAM SODIUM 200 GRAM(S): 250; 125 INJECTION, POWDER, FOR SUSPENSION INTRAMUSCULAR; INTRAVENOUS at 00:37

## 2021-09-21 RX ADMIN — Medication 100 MILLIGRAM(S): at 05:45

## 2021-09-21 RX ADMIN — Medication 1000 MILLIGRAM(S): at 17:04

## 2021-09-21 RX ADMIN — HEPARIN SODIUM 5000 UNIT(S): 5000 INJECTION INTRAVENOUS; SUBCUTANEOUS at 13:56

## 2021-09-21 RX ADMIN — GABAPENTIN 100 MILLIGRAM(S): 400 CAPSULE ORAL at 05:44

## 2021-09-21 RX ADMIN — LIDOCAINE 1 APPLICATION(S): 4 CREAM TOPICAL at 17:48

## 2021-09-21 RX ADMIN — PREGABALIN 1000 MICROGRAM(S): 225 CAPSULE ORAL at 11:29

## 2021-09-21 RX ADMIN — AMPICILLIN SODIUM AND SULBACTAM SODIUM 200 GRAM(S): 250; 125 INJECTION, POWDER, FOR SUSPENSION INTRAMUSCULAR; INTRAVENOUS at 05:44

## 2021-09-21 RX ADMIN — Medication 325 MILLIGRAM(S): at 11:30

## 2021-09-21 RX ADMIN — GABAPENTIN 100 MILLIGRAM(S): 400 CAPSULE ORAL at 17:56

## 2021-09-21 RX ADMIN — Medication 1000 MILLIGRAM(S): at 00:26

## 2021-09-21 RX ADMIN — Medication 60 MILLIGRAM(S): at 05:45

## 2021-09-21 RX ADMIN — OXYCODONE HYDROCHLORIDE 5 MILLIGRAM(S): 5 TABLET ORAL at 11:33

## 2021-09-21 RX ADMIN — POLYETHYLENE GLYCOL 3350 17 GRAM(S): 17 POWDER, FOR SOLUTION ORAL at 11:29

## 2021-09-21 RX ADMIN — ATORVASTATIN CALCIUM 80 MILLIGRAM(S): 80 TABLET, FILM COATED ORAL at 21:39

## 2021-09-21 RX ADMIN — AMPICILLIN SODIUM AND SULBACTAM SODIUM 200 GRAM(S): 250; 125 INJECTION, POWDER, FOR SUSPENSION INTRAMUSCULAR; INTRAVENOUS at 17:49

## 2021-09-21 RX ADMIN — Medication 1000 MILLIGRAM(S): at 05:53

## 2021-09-21 RX ADMIN — AMPICILLIN SODIUM AND SULBACTAM SODIUM 200 GRAM(S): 250; 125 INJECTION, POWDER, FOR SUSPENSION INTRAMUSCULAR; INTRAVENOUS at 11:29

## 2021-09-21 RX ADMIN — Medication 1000 MILLIGRAM(S): at 13:55

## 2021-09-21 RX ADMIN — OXYCODONE HYDROCHLORIDE 5 MILLIGRAM(S): 5 TABLET ORAL at 17:54

## 2021-09-21 RX ADMIN — HEPARIN SODIUM 5000 UNIT(S): 5000 INJECTION INTRAVENOUS; SUBCUTANEOUS at 05:45

## 2021-09-21 RX ADMIN — Medication 81 MILLIGRAM(S): at 11:30

## 2021-09-21 RX ADMIN — LISINOPRIL 10 MILLIGRAM(S): 2.5 TABLET ORAL at 05:45

## 2021-09-21 NOTE — PROGRESS NOTE ADULT - ATTENDING COMMENTS
Patient has no complaints today. No respiratory distress, right leg dressing c/d/i. Patient with severe aortic stenosis, no further work-up needed. Currently medically optimized. Plan to go to the OR tomorrow for debridement and wound culture with podiatry. Continue on levaquin and unasyn for likely osteomyelitis as per ID. F/u wound cultures and can discharge on IV anti-biotics per wound cultures and discharge to [facility] when work-up complete.

## 2021-09-21 NOTE — PROGRESS NOTE ADULT - SUBJECTIVE AND OBJECTIVE BOX
Podiatry Interval HPI: Pt seen bedside AAOx3. Pt endorses mild pain to R 5th surgical site. Denies constitutional symptoms. States if consent needed for anything to please call his daughter Korin telephone number: [phone]. No other pedal complaints.     Podiatry HPI: 76 year old male with a PMHx of DM, HTN, HLD, CAD to ED presents to the ED for a Right Foot s/p 5th foot partial ray resection and fillet toe flap. Patient states that he has sharp localized non-radiating pain to the Right foot 5th metatarsal. States that after his surgery, he did not see a podiatrist and he came into the ED because he saw drainage and was having pain. Denies any constitutional symptoms of N/V/C/F/SOB. Denies any other pedal complaints at this time. Denies calf pain today, bilaterally.    Medications acetaminophen   Tablet .. 1000 milliGRAM(s) Oral every 8 hours  ampicillin/sulbactam  IVPB      ampicillin/sulbactam  IVPB 3 Gram(s) IV Intermittent every 6 hours  aspirin  chewable 81 milliGRAM(s) Oral daily  atorvastatin 80 milliGRAM(s) Oral at bedtime  bisacodyl Suppository 10 milliGRAM(s) Rectal once  cyanocobalamin 1000 MICROGram(s) Oral daily  ergocalciferol 20917 Unit(s) Oral <User Schedule>  ferrous    sulfate 325 milliGRAM(s) Oral daily  gabapentin 100 milliGRAM(s) Oral every 12 hours  heparin   Injectable 5000 Unit(s) SubCutaneous every 8 hours  influenza   Vaccine 0.5 milliLiter(s) IntraMuscular once  insulin glargine Injectable (LANTUS) 5 Unit(s) SubCutaneous at bedtime  insulin lispro (ADMELOG) corrective regimen sliding scale   SubCutaneous Before meals and at bedtime  levoFLOXacin IVPB      levoFLOXacin IVPB 250 milliGRAM(s) IV Intermittent every 24 hours  lidocaine 5% Ointment 1 Application(s) Topical daily  lisinopril 10 milliGRAM(s) Oral daily  metoprolol succinate  milliGRAM(s) Oral daily  morphine  - Injectable 2 milliGRAM(s) IV Push daily PRN  NIFEdipine XL 60 milliGRAM(s) Oral daily  oxyCODONE    IR 5 milliGRAM(s) Oral every 4 hours PRN  polyethylene glycol 3350 17 Gram(s) Oral daily  senna 2 Tablet(s) Oral at bedtime    FHFamily history of acute myocardial infarction (Father)    Family history of diabetes mellitus (Mother, Sibling)    Family history of hypertension (Mother, Sibling)    Family history of hyperlipidemia (Mother, Sibling)    ,   PMHHTN (hypertension)    HLD (hyperlipidemia)    DM (diabetes mellitus)    CAD (coronary artery disease)    Glaucoma, angle-closure       PSHNo significant past surgical history    S/P CABG (coronary artery bypass graft)    History of thyroid surgery        Labs                          10.1   5.54  )-----------( 193      ( 21 Sep 2021 06:22 )             31.0      09-21    141  |  109<H>  |  15  ----------------------------<  69<L>  3.9   |  20<L>  |  1.42<H>    Ca    8.8      21 Sep 2021 06:22    TPro  7.1  /  Alb  2.6<L>  /  TBili  0.5  /  DBili  x   /  AST  11  /  ALT  12  /  AlkPhos  103  09-20     Vital Signs Last 24 Hrs  T(C): 36.3 (21 Sep 2021 05:13), Max: 37.1 (20 Sep 2021 20:54)  T(F): 97.4 (21 Sep 2021 05:13), Max: 98.8 (20 Sep 2021 20:54)  HR: 80 (21 Sep 2021 05:13) (76 - 80)  BP: 147/58 (21 Sep 2021 05:13) (139/57 - 152/56)  BP(mean): --  RR: 18 (21 Sep 2021 05:13) (18 - 18)  SpO2: 100% (21 Sep 2021 05:13) (96% - 100%)  Sedimentation Rate, Erythrocyte: 77 mm/Hr (09-16-21 @ 16:03)         C-Reactive Protein, Serum: 45 mg/L (09-16-21 @ 23:33)   WBC Count: 5.54 K/uL (09-21-21 @ 06:22)        PHYSICAL EXAM  GEN: SHER ROBERT is a pleasant well-nourished, well developed 76y Male in no acute distress, alert awake, and oriented to person, place and time.   LE Focused:    Vasc:  DP/PT pulses were faintly palpable, bilateral. DP/PT pulses were monophasic on doppler, bilaterally. CFT<3 seconds to all digits.   Derm: Surgical Incision site dehiscence noted to the lateral aspect of the right foot approx (7.5cm x 3.5cm x 2.5cm), tunnels to the proximal aspect to the bone, +PTB, hyperpigmentation surrounding the wound, erythematous when compared to the contralateral side, no drainage. No malodor noted. Fibrogranular wound base noted. No streaking noted.   Neuro: Protective sensation diminished, b/l  MSK: +5/5 muscle strength noted to the pedal compartments. 5th digit amputation on right foot, noted.     Imaging:  INTERPRETATION:  Postop, rule out infection.    3 views right foot. Prior 8/20/2021.     Status post resection of the fifth toe and distal fifth metatarsal unchanged in appearance. No focal bone lysis or unusual periosteal reaction to suggest osteomyelitis. No acute fracture or dislocation. The remainder study unchanged. No soft tissue gas.    IMPRESSION: No acute finding. No plain film evidence of osteomyelitis.        MRI R foot:     INTERPRETATION:  Clinical Information: Recent fifth metatarsal head resection now with fifth digit pain, swelling and foul discharge.    Comparison: Radiographs of the right foot from 9/16/2021 and MRI the right foot from 8/16/2021.    Technique:  MRI of the right midfoot and forefoot.  Intravenous Contrast: None.    Findings:    There is a resection at the mid aspect of the fifth metatarsal shaft. There is a lateral soft tissue wound beginning at the level of the amputation which extends distally. There is susceptibility artifact in the region of the amputation consistent with postoperative change. There is hyperintense T2 marrow signal throughout the remaining fifth metatarsal and within the adjacent fourth metatarsal head which is nonspecific and could be related to recent postoperative changes although osteomyelitis is suspected.    There is edema and mild atrophy within the plantar muscles of the foot. There is minimal spurring at the first metatarsophalangeal and hallux sesamoid articulations.    Impression:  Resection at the mid aspect of the fifth metatarsal. Lateral soft tissue wound with marrow signal abnormality throughout the fifth metatarsal and within the adjacent fourth metatarsal head which is nonspecific in the setting of recent surgery although osteomyelitis is suspected.      Culture Results:   Rare Aeromonas hydrophila/caviae   Few Klebsiella oxytoca/Raoutella ornithinolytica   Few Enterococcus faecalis   Few Streptococcus agalactiae (Group B) isolated   Group B streptococci are susceptible to ampicillin,   penicillin and cefazolin, but may be resistant to   erythromycin and clindamycin.   Recommendations for intrapartum prophylaxis for Group B   streptococci are penicillin or ampicillin. (09.16.21 @ 21:42)

## 2021-09-21 NOTE — PROGRESS NOTE ADULT - SUBJECTIVE AND OBJECTIVE BOX
NP Note discussed with  Primary Attending    Patient is a 76y old  Male who presents with a chief complaint of R Foot Pain (20 Sep 2021 12:56)      INTERVAL HPI/OVERNIGHT EVENTS: no acute events overnight, hemodynamically stable     MEDICATIONS  (STANDING):  acetaminophen   Tablet .. 1000 milliGRAM(s) Oral every 8 hours  ampicillin/sulbactam  IVPB      ampicillin/sulbactam  IVPB 3 Gram(s) IV Intermittent every 6 hours  aspirin  chewable 81 milliGRAM(s) Oral daily  atorvastatin 80 milliGRAM(s) Oral at bedtime  bisacodyl Suppository 10 milliGRAM(s) Rectal once  cyanocobalamin 1000 MICROGram(s) Oral daily  ergocalciferol 94602 Unit(s) Oral <User Schedule>  ferrous    sulfate 325 milliGRAM(s) Oral daily  gabapentin 100 milliGRAM(s) Oral every 12 hours  heparin   Injectable 5000 Unit(s) SubCutaneous every 8 hours  influenza   Vaccine 0.5 milliLiter(s) IntraMuscular once  insulin glargine Injectable (LANTUS) 5 Unit(s) SubCutaneous at bedtime  insulin lispro (ADMELOG) corrective regimen sliding scale   SubCutaneous Before meals and at bedtime  levoFLOXacin IVPB 250 milliGRAM(s) IV Intermittent every 24 hours  levoFLOXacin IVPB      lidocaine 5% Ointment 1 Application(s) Topical daily  lisinopril 10 milliGRAM(s) Oral daily  metoprolol succinate  milliGRAM(s) Oral daily  NIFEdipine XL 60 milliGRAM(s) Oral daily  polyethylene glycol 3350 17 Gram(s) Oral daily  senna 2 Tablet(s) Oral at bedtime    MEDICATIONS  (PRN):  morphine  - Injectable 2 milliGRAM(s) IV Push daily PRN Severe Pain (7 - 10)  oxyCODONE    IR 5 milliGRAM(s) Oral every 4 hours PRN Severe Pain (7 - 10)      __________________________________________________  REVIEW OF SYSTEMS:    CONSTITUTIONAL: No fever,   EYES: no acute visual disturbances  NECK: No pain or stiffness  RESPIRATORY: No cough; No shortness of breath  CARDIOVASCULAR: No chest pain, no palpitations  GASTROINTESTINAL: No pain. No nausea or vomiting; No diarrhea   NEUROLOGICAL: No headache or numbness, no tremors  MUSCULOSKELETAL: No joint pain, no muscle pain  GENITOURINARY: no dysuria, no frequency, no hesitancy  PSYCHIATRY: no depression , no anxiety  ALL OTHER  ROS negative        Vital Signs Last 24 Hrs  T(C): 37.1 (20 Sep 2021 05:00), Max: 37.3 (19 Sep 2021 14:16)  T(F): 98.7 (20 Sep 2021 05:00), Max: 99.2 (19 Sep 2021 21:15)  HR: 81 (20 Sep 2021 05:00) (70 - 81)  BP: 129/54 (20 Sep 2021 05:00) (129/54 - 136/64)  BP(mean): --  RR: 18 (20 Sep 2021 05:00) (18 - 18)  SpO2: 100% (20 Sep 2021 05:00) (98% - 100%)    ________________________________________________  PHYSICAL EXAM:  GENERAL: NAD  HEENT: Normocephalic;  conjunctivae and sclerae clear; moist mucous membranes;   NECK : supple  CHEST/LUNG: Clear to auscultation bilaterally with good air entry   HEART: S1 S2  regular; no murmurs, gallops or rubs  ABDOMEN: Soft, Nontender, Nondistended; Bowel sounds present  EXTREMITIES: right partial ray amputation with + purulent d/c   SKIN: warm and dry; no rash  NERVOUS SYSTEM:  Awake and alert; Oriented  to place, person and time ; no new deficits    _________________________________________________  LABS:                        10.4   6.18  )-----------( 190      ( 20 Sep 2021 07:07 )             32.1     09-20    138  |  109<H>  |  16  ----------------------------<  69<L>  3.9   |  18<L>  |  1.48<H>    Ca    8.7      20 Sep 2021 07:07    TPro  7.1  /  Alb  2.6<L>  /  TBili  0.5  /  DBili  x   /  AST  11  /  ALT  12  /  AlkPhos  103  09-20        CAPILLARY BLOOD GLUCOSE      POCT Blood Glucose.: 75 mg/dL (20 Sep 2021 11:44)  POCT Blood Glucose.: 83 mg/dL (20 Sep 2021 08:18)  POCT Blood Glucose.: 79 mg/dL (20 Sep 2021 06:23)  POCT Blood Glucose.: 114 mg/dL (19 Sep 2021 21:08)  POCT Blood Glucose.: 125 mg/dL (19 Sep 2021 16:50)        RADIOLOGY & ADDITIONAL TESTS:    Imaging  Reviewed:  YES  < from: MR Foot No Cont, Right (09.17.21 @ 13:04) >  Impression:  Resection at the mid aspect of the fifth metatarsal. Lateral soft tissue wound with marrow signal abnormality throughout the fifth metatarsal and within the adjacent fourth metatarsal head which is nonspecific in the setting of recent surgery although osteomyelitis is suspected.    < end of copied text >    Consultant(s) Notes Reviewed:   YES      Plan of care was discussed with patient and /or primary care giver; all questions and concerns were addressed

## 2021-09-21 NOTE — PROGRESS NOTE ADULT - ASSESSMENT
76 year old male with past medical history of poorly controlled IDDM, HTN, HLD, CAD s/p CABG and recent right partial 5th ray amputation (8/19)/21) who presented to ED with c/o right foot pain associated with discharge and foul odor. He tried taking Tylenol for the pain with minimal relief. Of note, the patient never followed up with podiatry after his procedure in August. MRI confirms suspected osteomyelitis - patient followed by podiatry and ID, plan for OR on Wednesday for debridement and possible VAC placement. Patient currently on course of Unasyn and Levofloxacin.

## 2021-09-21 NOTE — PROGRESS NOTE ADULT - PROBLEM SELECTOR PLAN 1
as a consequence of poorly controlled diabetes   continue with Unasyn, Levofloxacin  afebrile,  no leukocytosis  plan for OR for wound debridement Wednesday  local wound care as per podiatry   ESR, CRP elevated   Espinoza Perioperative Risk Score 2.1%  Dr. Wilkins consulted for cardiac clearance  pain management following  ID: Dr. Barrett  Podiatry onboard as a consequence of poorly controlled diabetes   continue with Unasyn, Levofloxacin  afebrile,  no leukocytosis  plan for OR for wound debridement Wednesday  local wound care as per podiatry   ESR, CRP elevated   Espinoza Perioperative Risk Score 2.1%  Dr. Wilkins consulted for cardiac clearance  ID: Dr. Barrett  pain management following  Podiatry onboard

## 2021-09-21 NOTE — PROGRESS NOTE ADULT - ASSESSMENT
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  As per daughter, patient was taking tylenol which did not help his pain prompting the patient to ask his daughter to take him to the hospital. ON admission, he has no fever or Leukocytosis. The Xray of Right foot shows no Osteomyelitis but MRI of Right foot shows Lateral soft tissue wound with marrow signal abnormality throughout the fifth metatarsal which is consistent of Osteomyelitis. He has seen by Podiatry and wound culture has sent, Zosyn and Vancomycin has started. The ID consult requested to assist with further evaluation and antibiotic management.     # Right Fifth metatarsal DFU with drainage and Osteomyelitis- wound cx grew Enterococcus, Aeromonas and Klebsiella - Zosyn is the ideal to cover All organisms but kidney function is worsening, hence change to Unasyn and Levaquin until kidney function is improved     would recommend:    1. Awaiting for OR debridement   2. Continue Unasyn and Levaquin  to cover All Organisms grew In wound culture, until kidney function is improved   3. Monitor Kidney function and adjust Abx doses accordingly  4. Wound care as per Podiatry  5. Pain management as needed    d/w patient and Nursing staff     Attending Attestation:    Spent more than 35 minutes on total encounter, more than 50 % of the visit was spent counseling and/or coordinating care by the Attending physician.

## 2021-09-21 NOTE — PROGRESS NOTE ADULT - ASSESSMENT
A:   Right 5th ray wound dehiscence  s/p R 5th ray resection - 8/19     P:  Patient evaluated, chart reviewed  Explained all clinical findings to the patient and the daughter of the patient to their satisfaction  Answered all questions and concerns of the patient and the patient's daughter to their satisfaction   Xrays reviewed  ESR/CRP reviewed   MRI reviewed   Removed the dressings and evaluated the wound  Applied Santly, DSD, ACE    Plan for OR wound debridement, somagen graft application, with wound vac application & bone biopsy FOR TOMORROW 9/22 at 12pm  Requesting medical optimization per medicine  COVID PCR ORDERED  NPO Diet ordered  Appreciate ID consult   Podiatry will follow while in house  Discussed with Dr. Vazquez and seen with Dr. Lara

## 2021-09-21 NOTE — PROGRESS NOTE ADULT - PROBLEM SELECTOR PLAN 7
PT consulted, recommend STEVAN  daughter would like discharge to [facility] as wife is also admitted there

## 2021-09-21 NOTE — PROGRESS NOTE ADULT - SUBJECTIVE AND OBJECTIVE BOX
Patient is seen and examined at the bed side, is afebrile. The  kidney function is improving.        REVIEW OF SYSTEMS: All other review systems are negative      ALLERGIES: No Known Allergies      Vital Signs Last 24 Hrs  T(C): 36.3 (21 Sep 2021 13:54), Max: 37.1 (20 Sep 2021 20:54)  T(F): 97.4 (21 Sep 2021 13:54), Max: 98.8 (20 Sep 2021 20:54)  HR: 73 (21 Sep 2021 13:54) (73 - 80)  BP: 137/55 (21 Sep 2021 13:54) (137/55 - 147/58)  BP(mean): --  RR: 18 (21 Sep 2021 13:54) (18 - 18)  SpO2: 100% (21 Sep 2021 13:54) (96% - 100%)      PHYSICAL EXAM:  GENERAL: Not in distress   CHEST/LUNG:  Not using accessory muscles  HEART: s1 and s2 present  ABDOMEN:  Nontender and  Nondistended  EXTREMITIES: Right foot bandage in placed   CNS: Awake and Alert      LABS:                        10.1   5.54  )-----------( 193      ( 21 Sep 2021 06:22 )             31.0                                    10.4   6.18  )-----------( 190      ( 20 Sep 2021 07:07 )             32.1       09-21    141  |  109<H>  |  15  ----------------------------<  69<L>  3.9   |  20<L>  |  1.42<H>    Ca    8.8      21 Sep 2021 06:22    TPro  7.1  /  Alb  2.6<L>  /  TBili  0.5  /  DBili  x   /  AST  11  /  ALT  12  /  AlkPhos  103  09-20    09-19    137  |  107  |  17  ----------------------------<  82  4.1   |  22  |  1.85<H>    Ca    9.1      19 Sep 2021 06:28    TPro  7.2  /  Alb  2.7<L>  /  TBili  0.6  /  DBili  x   /  AST  10  /  ALT  13  /  AlkPhos  97  09-19        CAPILLARY BLOOD GLUCOSE  POCT Blood Glucose.: 134 mg/dL (17 Sep 2021 17:11)  POCT Blood Glucose.: 128 mg/dL (17 Sep 2021 11:06)  POCT Blood Glucose.: 157 mg/dL (17 Sep 2021 04:10)      MEDICATIONS  (STANDING):    ampicillin/sulbactam  IVPB      ampicillin/sulbactam  IVPB 3 Gram(s) IV Intermittent every 6 hours  aspirin  chewable 81 milliGRAM(s) Oral daily  atorvastatin 80 milliGRAM(s) Oral at bedtime  bisacodyl Suppository 10 milliGRAM(s) Rectal once  cyanocobalamin 1000 MICROGram(s) Oral daily  ergocalciferol 64146 Unit(s) Oral <User Schedule>  ferrous    sulfate 325 milliGRAM(s) Oral daily  gabapentin 100 milliGRAM(s) Oral every 12 hours  influenza   Vaccine 0.5 milliLiter(s) IntraMuscular once  insulin glargine Injectable (LANTUS) 5 Unit(s) SubCutaneous at bedtime  insulin lispro (ADMELOG) corrective regimen sliding scale   SubCutaneous Before meals and at bedtime  levoFLOXacin IVPB      levoFLOXacin IVPB 250 milliGRAM(s) IV Intermittent every 24 hours  lidocaine 5% Ointment 1 Application(s) Topical daily  lisinopril 10 milliGRAM(s) Oral daily  metoprolol succinate  milliGRAM(s) Oral daily  NIFEdipine XL 60 milliGRAM(s) Oral daily  polyethylene glycol 3350 17 Gram(s) Oral daily  senna 2 Tablet(s) Oral at bedtime      RADIOLOGY & ADDITIONAL TESTS:    9/17/21 : MR Foot No Cont, Right (09.17.21 @ 13:04) : Resection at the mid aspect of the fifth metatarsal. Lateral soft tissue wound with marrow signal abnormality throughout the fifth metatarsal and within the adjacent fourth metatarsal head which is nonspecific in the setting of recent surgery although osteomyelitis is suspected.    9/16/21 : Xray Foot AP + Lateral + Oblique, Right (09.16.21 @ 15:03) : No acute finding. No plain film evidence of osteomyelitis.        MICROBIOLOGY DATA:    COVID-19 David Domain Antibody (09.18.21 @ 12:55)   COVID-19 David Domain Antibody Result: >250.00:    Culture - Blood (09.17.21 @ 10:28)   Specimen Source: .Blood Blood-Peripheral   Culture Results: No growth to date.     Culture - Blood (09.17.21 @ 10:28)   Specimen Source: .Blood Blood-Venous   Culture Results: No growth to date.     Culture - Surgical Swab (09.16.21 @ 21:42)   - Amikacin: S <=16   - Amikacin: S <=16   - Amoxicillin/Clavulanic Acid: S <=8/4   - Ampicillin: R >16 These ampicillin results predict results for amoxicillin   - Ampicillin: S <=2 Predicts results to ampicillin/sulbactam, amoxacillin-clavulanate and piperacillin-tazobactam.   - Ampicillin/Sulbactam: S 8/4 Enterobacter, Citrobacter, and Serratia may develop resistance during prolonged therapy (3-4 days)   - Ampicillin/Sulbactam: R >16/8   - Aztreonam: S <=4   - Aztreonam: S <=4   - Cefazolin: R 16 Enterobacter, Citrobacter, and Serratia may develop resistance during prolonged therapy (3-4 days)   - Cefazolin: R >16   - Cefepime: S <=2   - Cefepime: S <=2   - Cefoxitin: S <=8   - Cefoxitin: S <=8   - Ceftazidime: S <=1   - Ceftriaxone: S <=1   - Ceftriaxone: S <=1 Enterobacter, Citrobacter, and Serratia may develop resistance during prolonged therapy   - Ciprofloxacin: S <=0.25   - Ciprofloxacin: S <=0.25   - Ertapenem: S <=0.5   - Gentamicin: S <=2   - Gentamicin: S <=2   - Imipenem: S <=1   - Levofloxacin: S <=0.5   - Levofloxacin: S <=0.5   - Meropenem: S <=1   - Meropenem: S <=1   - Piperacillin/Tazobactam: S <=8   - Piperacillin/Tazobactam: S <=8   - Tetra/Doxy: S 4   - Tobramycin: S <=2   - Trimethoprim/Sulfamethoxazole: S <=0.5/9.5   - Trimethoprim/Sulfamethoxazole: S <=0.5/9.5   - Vancomycin: S 2   Specimen Source: .Surgical Swab right foot wound   Culture Results:   Culture yields >4 types of aerobic and/or anaerobic bacteria   Call client services within 7 days if further workup is clinically   indicated. Culture includes   Rare Aeromonas hydrophila/caviae   Few Klebsiella oxytoca/Raoutella ornithinolytica   Few Enterococcus faecalis   Few Streptococcus agalactiae (Group B) isolated   Group B streptococci are susceptible to ampicillin,   penicillin and cefazolin, but may be resistant to   erythromycin and clindamycin.   Recommendations for intrapartum prophylaxis for Group B   streptococci are penicillin or ampicillin.   Organism Identification: Aeromonas hydrophila/caviae   Klebsiella oxytoca /Raoutella ornithinolytica   Enterococcus faecalis   Organism: Aeromonas hydrophila/caviae   Organism: Klebsiella oxytoca /Raoutella ornithinolytica   Organism: Enterococcus faecalis   COVID-19 PCR . (09.16.21 @ 22:24)   COVID-19 PCR: NotDetec:               Patient is seen and examined at the bed side, is afebrile. He mentioned feeling better.  The  kidney function is improving.        REVIEW OF SYSTEMS: All other review systems are negative      ALLERGIES: No Known Allergies      Vital Signs Last 24 Hrs  T(C): 36.3 (21 Sep 2021 13:54), Max: 37.1 (20 Sep 2021 20:54)  T(F): 97.4 (21 Sep 2021 13:54), Max: 98.8 (20 Sep 2021 20:54)  HR: 73 (21 Sep 2021 13:54) (73 - 80)  BP: 137/55 (21 Sep 2021 13:54) (137/55 - 147/58)  BP(mean): --  RR: 18 (21 Sep 2021 13:54) (18 - 18)  SpO2: 100% (21 Sep 2021 13:54) (96% - 100%)      PHYSICAL EXAM:  GENERAL: Not in distress   CHEST/LUNG:  Not using accessory muscles  HEART: s1 and s2 present  ABDOMEN:  Nontender and  Nondistended  EXTREMITIES: Right foot bandage in placed   CNS: Awake and Alert      LABS:                        10.1   5.54  )-----------( 193      ( 21 Sep 2021 06:22 )             31.0                                    10.4   6.18  )-----------( 190      ( 20 Sep 2021 07:07 )             32.1       09-21    141  |  109<H>  |  15  ----------------------------<  69<L>  3.9   |  20<L>  |  1.42<H>    Ca    8.8      21 Sep 2021 06:22    TPro  7.1  /  Alb  2.6<L>  /  TBili  0.5  /  DBili  x   /  AST  11  /  ALT  12  /  AlkPhos  103  09-20    09-19    137  |  107  |  17  ----------------------------<  82  4.1   |  22  |  1.85<H>    Ca    9.1      19 Sep 2021 06:28    TPro  7.2  /  Alb  2.7<L>  /  TBili  0.6  /  DBili  x   /  AST  10  /  ALT  13  /  AlkPhos  97  09-19        CAPILLARY BLOOD GLUCOSE  POCT Blood Glucose.: 134 mg/dL (17 Sep 2021 17:11)  POCT Blood Glucose.: 128 mg/dL (17 Sep 2021 11:06)  POCT Blood Glucose.: 157 mg/dL (17 Sep 2021 04:10)      MEDICATIONS  (STANDING):    ampicillin/sulbactam  IVPB      ampicillin/sulbactam  IVPB 3 Gram(s) IV Intermittent every 6 hours  aspirin  chewable 81 milliGRAM(s) Oral daily  atorvastatin 80 milliGRAM(s) Oral at bedtime  bisacodyl Suppository 10 milliGRAM(s) Rectal once  cyanocobalamin 1000 MICROGram(s) Oral daily  ergocalciferol 23900 Unit(s) Oral <User Schedule>  ferrous    sulfate 325 milliGRAM(s) Oral daily  gabapentin 100 milliGRAM(s) Oral every 12 hours  influenza   Vaccine 0.5 milliLiter(s) IntraMuscular once  insulin glargine Injectable (LANTUS) 5 Unit(s) SubCutaneous at bedtime  insulin lispro (ADMELOG) corrective regimen sliding scale   SubCutaneous Before meals and at bedtime  levoFLOXacin IVPB      levoFLOXacin IVPB 250 milliGRAM(s) IV Intermittent every 24 hours  lidocaine 5% Ointment 1 Application(s) Topical daily  lisinopril 10 milliGRAM(s) Oral daily  metoprolol succinate  milliGRAM(s) Oral daily  NIFEdipine XL 60 milliGRAM(s) Oral daily  polyethylene glycol 3350 17 Gram(s) Oral daily  senna 2 Tablet(s) Oral at bedtime      RADIOLOGY & ADDITIONAL TESTS:    9/17/21 : MR Foot No Cont, Right (09.17.21 @ 13:04) : Resection at the mid aspect of the fifth metatarsal. Lateral soft tissue wound with marrow signal abnormality throughout the fifth metatarsal and within the adjacent fourth metatarsal head which is nonspecific in the setting of recent surgery although osteomyelitis is suspected.    9/16/21 : Xray Foot AP + Lateral + Oblique, Right (09.16.21 @ 15:03) : No acute finding. No plain film evidence of osteomyelitis.        MICROBIOLOGY DATA:    COVID-19 David Domain Antibody (09.18.21 @ 12:55)   COVID-19 David Domain Antibody Result: >250.00:    Culture - Blood (09.17.21 @ 10:28)   Specimen Source: .Blood Blood-Peripheral   Culture Results: No growth to date.     Culture - Blood (09.17.21 @ 10:28)   Specimen Source: .Blood Blood-Venous   Culture Results: No growth to date.     Culture - Surgical Swab (09.16.21 @ 21:42)   - Amikacin: S <=16   - Amikacin: S <=16   - Amoxicillin/Clavulanic Acid: S <=8/4   - Ampicillin: R >16 These ampicillin results predict results for amoxicillin   - Ampicillin: S <=2 Predicts results to ampicillin/sulbactam, amoxacillin-clavulanate and piperacillin-tazobactam.   - Ampicillin/Sulbactam: S 8/4 Enterobacter, Citrobacter, and Serratia may develop resistance during prolonged therapy (3-4 days)   - Ampicillin/Sulbactam: R >16/8   - Aztreonam: S <=4   - Aztreonam: S <=4   - Cefazolin: R 16 Enterobacter, Citrobacter, and Serratia may develop resistance during prolonged therapy (3-4 days)   - Cefazolin: R >16   - Cefepime: S <=2   - Cefepime: S <=2   - Cefoxitin: S <=8   - Cefoxitin: S <=8   - Ceftazidime: S <=1   - Ceftriaxone: S <=1   - Ceftriaxone: S <=1 Enterobacter, Citrobacter, and Serratia may develop resistance during prolonged therapy   - Ciprofloxacin: S <=0.25   - Ciprofloxacin: S <=0.25   - Ertapenem: S <=0.5   - Gentamicin: S <=2   - Gentamicin: S <=2   - Imipenem: S <=1   - Levofloxacin: S <=0.5   - Levofloxacin: S <=0.5   - Meropenem: S <=1   - Meropenem: S <=1   - Piperacillin/Tazobactam: S <=8   - Piperacillin/Tazobactam: S <=8   - Tetra/Doxy: S 4   - Tobramycin: S <=2   - Trimethoprim/Sulfamethoxazole: S <=0.5/9.5   - Trimethoprim/Sulfamethoxazole: S <=0.5/9.5   - Vancomycin: S 2   Specimen Source: .Surgical Swab right foot wound   Culture Results:   Culture yields >4 types of aerobic and/or anaerobic bacteria   Call client services within 7 days if further workup is clinically   indicated. Culture includes   Rare Aeromonas hydrophila/caviae   Few Klebsiella oxytoca/Raoutella ornithinolytica   Few Enterococcus faecalis   Few Streptococcus agalactiae (Group B) isolated   Group B streptococci are susceptible to ampicillin,   penicillin and cefazolin, but may be resistant to   erythromycin and clindamycin.   Recommendations for intrapartum prophylaxis for Group B   streptococci are penicillin or ampicillin.   Organism Identification: Aeromonas hydrophila/caviae   Klebsiella oxytoca /Raoutella ornithinolytica   Enterococcus faecalis   Organism: Aeromonas hydrophila/caviae   Organism: Klebsiella oxytoca /Raoutella ornithinolytica   Organism: Enterococcus faecalis   COVID-19 PCR . (09.16.21 @ 22:24)   COVID-19 PCR: NotDetec:

## 2021-09-21 NOTE — PROGRESS NOTE ADULT - CONVERSATION DETAILS
Patient deferred health care decision making to his children. HCP and MOLST form filled out. Family wishes patient to be FULL CODE.

## 2021-09-22 LAB
ANION GAP SERPL CALC-SCNC: 11 MMOL/L — SIGNIFICANT CHANGE UP (ref 5–17)
ANION GAP SERPL CALC-SCNC: 14 MMOL/L — SIGNIFICANT CHANGE UP (ref 5–17)
APPEARANCE UR: ABNORMAL
APTT BLD: 33.5 SEC — SIGNIFICANT CHANGE UP (ref 27.5–35.5)
BILIRUB UR-MCNC: NEGATIVE — SIGNIFICANT CHANGE UP
BUN SERPL-MCNC: 24 MG/DL — HIGH (ref 7–18)
BUN SERPL-MCNC: 25 MG/DL — HIGH (ref 7–18)
CALCIUM SERPL-MCNC: 8.8 MG/DL — SIGNIFICANT CHANGE UP (ref 8.4–10.5)
CALCIUM SERPL-MCNC: 9.2 MG/DL — SIGNIFICANT CHANGE UP (ref 8.4–10.5)
CHLORIDE SERPL-SCNC: 109 MMOL/L — HIGH (ref 96–108)
CHLORIDE SERPL-SCNC: 109 MMOL/L — HIGH (ref 96–108)
CHLORIDE UR-SCNC: 52 MMOL/L — SIGNIFICANT CHANGE UP
CO2 SERPL-SCNC: 18 MMOL/L — LOW (ref 22–31)
CO2 SERPL-SCNC: 21 MMOL/L — LOW (ref 22–31)
COLOR SPEC: YELLOW — SIGNIFICANT CHANGE UP
CREAT ?TM UR-MCNC: 131 MG/DL — SIGNIFICANT CHANGE UP
CREAT SERPL-MCNC: 2.38 MG/DL — HIGH (ref 0.5–1.3)
CREAT SERPL-MCNC: 2.52 MG/DL — HIGH (ref 0.5–1.3)
CULTURE RESULTS: SIGNIFICANT CHANGE UP
CULTURE RESULTS: SIGNIFICANT CHANGE UP
DIFF PNL FLD: ABNORMAL
GLUCOSE BLDC GLUCOMTR-MCNC: 100 MG/DL — HIGH (ref 70–99)
GLUCOSE BLDC GLUCOMTR-MCNC: 100 MG/DL — HIGH (ref 70–99)
GLUCOSE BLDC GLUCOMTR-MCNC: 134 MG/DL — HIGH (ref 70–99)
GLUCOSE BLDC GLUCOMTR-MCNC: 85 MG/DL — SIGNIFICANT CHANGE UP (ref 70–99)
GLUCOSE BLDC GLUCOMTR-MCNC: 91 MG/DL — SIGNIFICANT CHANGE UP (ref 70–99)
GLUCOSE BLDC GLUCOMTR-MCNC: 92 MG/DL — SIGNIFICANT CHANGE UP (ref 70–99)
GLUCOSE BLDC GLUCOMTR-MCNC: 94 MG/DL — SIGNIFICANT CHANGE UP (ref 70–99)
GLUCOSE SERPL-MCNC: 85 MG/DL — SIGNIFICANT CHANGE UP (ref 70–99)
GLUCOSE SERPL-MCNC: 94 MG/DL — SIGNIFICANT CHANGE UP (ref 70–99)
GLUCOSE UR QL: NEGATIVE — SIGNIFICANT CHANGE UP
GRAM STN FLD: SIGNIFICANT CHANGE UP
HCT VFR BLD CALC: 30.9 % — LOW (ref 39–50)
HGB BLD-MCNC: 9.9 G/DL — LOW (ref 13–17)
INR BLD: 1.27 RATIO — HIGH (ref 0.88–1.16)
KETONES UR-MCNC: ABNORMAL
LEUKOCYTE ESTERASE UR-ACNC: ABNORMAL
MCHC RBC-ENTMCNC: 29 PG — SIGNIFICANT CHANGE UP (ref 27–34)
MCHC RBC-ENTMCNC: 32 GM/DL — SIGNIFICANT CHANGE UP (ref 32–36)
MCV RBC AUTO: 90.6 FL — SIGNIFICANT CHANGE UP (ref 80–100)
NITRITE UR-MCNC: NEGATIVE — SIGNIFICANT CHANGE UP
NRBC # BLD: 0 /100 WBCS — SIGNIFICANT CHANGE UP (ref 0–0)
PH UR: 5 — SIGNIFICANT CHANGE UP (ref 5–8)
PLATELET # BLD AUTO: 219 K/UL — SIGNIFICANT CHANGE UP (ref 150–400)
POTASSIUM SERPL-MCNC: 4.1 MMOL/L — SIGNIFICANT CHANGE UP (ref 3.5–5.3)
POTASSIUM SERPL-MCNC: 4.2 MMOL/L — SIGNIFICANT CHANGE UP (ref 3.5–5.3)
POTASSIUM SERPL-SCNC: 4.1 MMOL/L — SIGNIFICANT CHANGE UP (ref 3.5–5.3)
POTASSIUM SERPL-SCNC: 4.2 MMOL/L — SIGNIFICANT CHANGE UP (ref 3.5–5.3)
PROT UR-MCNC: 30 MG/DL
PROTHROM AB SERPL-ACNC: 14.9 SEC — HIGH (ref 10.6–13.6)
RBC # BLD: 3.41 M/UL — LOW (ref 4.2–5.8)
RBC # FLD: 13.3 % — SIGNIFICANT CHANGE UP (ref 10.3–14.5)
SODIUM SERPL-SCNC: 141 MMOL/L — SIGNIFICANT CHANGE UP (ref 135–145)
SODIUM SERPL-SCNC: 141 MMOL/L — SIGNIFICANT CHANGE UP (ref 135–145)
SODIUM UR-SCNC: 53 MMOL/L — SIGNIFICANT CHANGE UP
SP GR SPEC: 1.02 — SIGNIFICANT CHANGE UP (ref 1.01–1.02)
SPECIMEN SOURCE: SIGNIFICANT CHANGE UP
UROBILINOGEN FLD QL: NEGATIVE — SIGNIFICANT CHANGE UP
WBC # BLD: 5.46 K/UL — SIGNIFICANT CHANGE UP (ref 3.8–10.5)
WBC # FLD AUTO: 5.46 K/UL — SIGNIFICANT CHANGE UP (ref 3.8–10.5)

## 2021-09-22 PROCEDURE — 88304 TISSUE EXAM BY PATHOLOGIST: CPT | Mod: 26

## 2021-09-22 PROCEDURE — 99233 SBSQ HOSP IP/OBS HIGH 50: CPT

## 2021-09-22 RX ORDER — MORPHINE SULFATE 50 MG/1
2 CAPSULE, EXTENDED RELEASE ORAL DAILY
Refills: 0 | Status: DISCONTINUED | OUTPATIENT
Start: 2021-09-22 | End: 2021-09-22

## 2021-09-22 RX ORDER — HYDROMORPHONE HYDROCHLORIDE 2 MG/ML
0.5 INJECTION INTRAMUSCULAR; INTRAVENOUS; SUBCUTANEOUS DAILY
Refills: 0 | Status: DISCONTINUED | OUTPATIENT
Start: 2021-09-22 | End: 2021-09-25

## 2021-09-22 RX ORDER — SODIUM CHLORIDE 9 MG/ML
1000 INJECTION, SOLUTION INTRAVENOUS
Refills: 0 | Status: DISCONTINUED | OUTPATIENT
Start: 2021-09-22 | End: 2021-09-23

## 2021-09-22 RX ORDER — GABAPENTIN 400 MG/1
100 CAPSULE ORAL EVERY 12 HOURS
Refills: 0 | Status: DISCONTINUED | OUTPATIENT
Start: 2021-09-22 | End: 2021-09-24

## 2021-09-22 RX ORDER — NIFEDIPINE 30 MG
90 TABLET, EXTENDED RELEASE 24 HR ORAL DAILY
Refills: 0 | Status: DISCONTINUED | OUTPATIENT
Start: 2021-09-22 | End: 2021-09-25

## 2021-09-22 RX ORDER — HYDROMORPHONE HYDROCHLORIDE 2 MG/ML
0.5 INJECTION INTRAMUSCULAR; INTRAVENOUS; SUBCUTANEOUS
Refills: 0 | Status: DISCONTINUED | OUTPATIENT
Start: 2021-09-22 | End: 2021-09-22

## 2021-09-22 RX ORDER — INSULIN LISPRO 100/ML
VIAL (ML) SUBCUTANEOUS
Refills: 0 | Status: DISCONTINUED | OUTPATIENT
Start: 2021-09-22 | End: 2021-10-10

## 2021-09-22 RX ORDER — SENNA PLUS 8.6 MG/1
2 TABLET ORAL AT BEDTIME
Refills: 0 | Status: DISCONTINUED | OUTPATIENT
Start: 2021-09-22 | End: 2021-10-08

## 2021-09-22 RX ORDER — OXYCODONE HYDROCHLORIDE 5 MG/1
5 TABLET ORAL EVERY 4 HOURS
Refills: 0 | Status: DISCONTINUED | OUTPATIENT
Start: 2021-09-22 | End: 2021-09-25

## 2021-09-22 RX ORDER — INSULIN GLARGINE 100 [IU]/ML
5 INJECTION, SOLUTION SUBCUTANEOUS AT BEDTIME
Refills: 0 | Status: DISCONTINUED | OUTPATIENT
Start: 2021-09-22 | End: 2021-09-22

## 2021-09-22 RX ORDER — ERGOCALCIFEROL 1.25 MG/1
50000 CAPSULE ORAL
Refills: 0 | Status: DISCONTINUED | OUTPATIENT
Start: 2021-09-22 | End: 2021-10-24

## 2021-09-22 RX ORDER — SODIUM CHLORIDE 9 MG/ML
1000 INJECTION, SOLUTION INTRAVENOUS
Refills: 0 | Status: DISCONTINUED | OUTPATIENT
Start: 2021-09-22 | End: 2021-09-22

## 2021-09-22 RX ORDER — AMPICILLIN SODIUM AND SULBACTAM SODIUM 250; 125 MG/ML; MG/ML
3 INJECTION, POWDER, FOR SUSPENSION INTRAMUSCULAR; INTRAVENOUS
Qty: 0 | Refills: 0 | DISCHARGE
Start: 2021-09-22 | End: 2021-11-03

## 2021-09-22 RX ORDER — FERROUS SULFATE 325(65) MG
325 TABLET ORAL DAILY
Refills: 0 | Status: DISCONTINUED | OUTPATIENT
Start: 2021-09-22 | End: 2021-10-11

## 2021-09-22 RX ORDER — POLYETHYLENE GLYCOL 3350 17 G/17G
17 POWDER, FOR SOLUTION ORAL DAILY
Refills: 0 | Status: DISCONTINUED | OUTPATIENT
Start: 2021-09-22 | End: 2021-10-02

## 2021-09-22 RX ORDER — ATORVASTATIN CALCIUM 80 MG/1
80 TABLET, FILM COATED ORAL AT BEDTIME
Refills: 0 | Status: DISCONTINUED | OUTPATIENT
Start: 2021-09-22 | End: 2021-10-11

## 2021-09-22 RX ORDER — AMPICILLIN SODIUM AND SULBACTAM SODIUM 250; 125 MG/ML; MG/ML
INJECTION, POWDER, FOR SUSPENSION INTRAMUSCULAR; INTRAVENOUS
Refills: 0 | Status: DISCONTINUED | OUTPATIENT
Start: 2021-09-22 | End: 2021-09-22

## 2021-09-22 RX ORDER — FENTANYL CITRATE 50 UG/ML
25 INJECTION INTRAVENOUS
Refills: 0 | Status: DISCONTINUED | OUTPATIENT
Start: 2021-09-22 | End: 2021-09-22

## 2021-09-22 RX ORDER — AMPICILLIN SODIUM AND SULBACTAM SODIUM 250; 125 MG/ML; MG/ML
3 INJECTION, POWDER, FOR SUSPENSION INTRAMUSCULAR; INTRAVENOUS EVERY 12 HOURS
Refills: 0 | Status: DISCONTINUED | OUTPATIENT
Start: 2021-09-22 | End: 2021-09-30

## 2021-09-22 RX ORDER — AMPICILLIN SODIUM AND SULBACTAM SODIUM 250; 125 MG/ML; MG/ML
3 INJECTION, POWDER, FOR SUSPENSION INTRAMUSCULAR; INTRAVENOUS ONCE
Refills: 0 | Status: DISCONTINUED | OUTPATIENT
Start: 2021-09-22 | End: 2021-09-22

## 2021-09-22 RX ORDER — AMPICILLIN SODIUM AND SULBACTAM SODIUM 250; 125 MG/ML; MG/ML
3 INJECTION, POWDER, FOR SUSPENSION INTRAMUSCULAR; INTRAVENOUS EVERY 6 HOURS
Refills: 0 | Status: DISCONTINUED | OUTPATIENT
Start: 2021-09-22 | End: 2021-09-22

## 2021-09-22 RX ORDER — FLUCONAZOLE 150 MG/1
100 TABLET ORAL
Qty: 0 | Refills: 0 | DISCHARGE
Start: 2021-09-22 | End: 2021-11-03

## 2021-09-22 RX ORDER — NIFEDIPINE 30 MG
60 TABLET, EXTENDED RELEASE 24 HR ORAL DAILY
Refills: 0 | Status: DISCONTINUED | OUTPATIENT
Start: 2021-09-22 | End: 2021-09-22

## 2021-09-22 RX ORDER — CHLORHEXIDINE GLUCONATE 213 G/1000ML
1 SOLUTION TOPICAL DAILY
Refills: 0 | Status: DISCONTINUED | OUTPATIENT
Start: 2021-09-22 | End: 2021-09-22

## 2021-09-22 RX ORDER — PREGABALIN 225 MG/1
1000 CAPSULE ORAL DAILY
Refills: 0 | Status: DISCONTINUED | OUTPATIENT
Start: 2021-09-22 | End: 2021-10-11

## 2021-09-22 RX ORDER — HEPARIN SODIUM 5000 [USP'U]/ML
5000 INJECTION INTRAVENOUS; SUBCUTANEOUS EVERY 8 HOURS
Refills: 0 | Status: DISCONTINUED | OUTPATIENT
Start: 2021-09-22 | End: 2021-10-06

## 2021-09-22 RX ORDER — LISINOPRIL 2.5 MG/1
10 TABLET ORAL DAILY
Refills: 0 | Status: DISCONTINUED | OUTPATIENT
Start: 2021-09-22 | End: 2021-09-22

## 2021-09-22 RX ORDER — METOPROLOL TARTRATE 50 MG
100 TABLET ORAL DAILY
Refills: 0 | Status: DISCONTINUED | OUTPATIENT
Start: 2021-09-22 | End: 2021-10-08

## 2021-09-22 RX ADMIN — POLYETHYLENE GLYCOL 3350 17 GRAM(S): 17 POWDER, FOR SOLUTION ORAL at 11:53

## 2021-09-22 RX ADMIN — MORPHINE SULFATE 2 MILLIGRAM(S): 50 CAPSULE, EXTENDED RELEASE ORAL at 11:57

## 2021-09-22 RX ADMIN — Medication 90 MILLIGRAM(S): at 17:43

## 2021-09-22 RX ADMIN — HYDROMORPHONE HYDROCHLORIDE 0.5 MILLIGRAM(S): 2 INJECTION INTRAMUSCULAR; INTRAVENOUS; SUBCUTANEOUS at 18:03

## 2021-09-22 RX ADMIN — OXYCODONE HYDROCHLORIDE 5 MILLIGRAM(S): 5 TABLET ORAL at 21:26

## 2021-09-22 RX ADMIN — OXYCODONE HYDROCHLORIDE 5 MILLIGRAM(S): 5 TABLET ORAL at 01:24

## 2021-09-22 RX ADMIN — OXYCODONE HYDROCHLORIDE 5 MILLIGRAM(S): 5 TABLET ORAL at 00:54

## 2021-09-22 RX ADMIN — Medication 100 MILLIGRAM(S): at 11:54

## 2021-09-22 RX ADMIN — HYDROMORPHONE HYDROCHLORIDE 0.5 MILLIGRAM(S): 2 INJECTION INTRAMUSCULAR; INTRAVENOUS; SUBCUTANEOUS at 19:08

## 2021-09-22 RX ADMIN — ATORVASTATIN CALCIUM 80 MILLIGRAM(S): 80 TABLET, FILM COATED ORAL at 21:12

## 2021-09-22 RX ADMIN — OXYCODONE HYDROCHLORIDE 5 MILLIGRAM(S): 5 TABLET ORAL at 22:18

## 2021-09-22 RX ADMIN — Medication 60 MILLIGRAM(S): at 11:54

## 2021-09-22 RX ADMIN — OXYCODONE HYDROCHLORIDE 5 MILLIGRAM(S): 5 TABLET ORAL at 19:08

## 2021-09-22 RX ADMIN — SODIUM CHLORIDE 75 MILLILITER(S): 9 INJECTION, SOLUTION INTRAVENOUS at 17:50

## 2021-09-22 RX ADMIN — PREGABALIN 1000 MICROGRAM(S): 225 CAPSULE ORAL at 11:53

## 2021-09-22 RX ADMIN — Medication 325 MILLIGRAM(S): at 11:54

## 2021-09-22 RX ADMIN — SENNA PLUS 2 TABLET(S): 8.6 TABLET ORAL at 21:12

## 2021-09-22 RX ADMIN — AMPICILLIN SODIUM AND SULBACTAM SODIUM 200 GRAM(S): 250; 125 INJECTION, POWDER, FOR SUSPENSION INTRAMUSCULAR; INTRAVENOUS at 17:37

## 2021-09-22 RX ADMIN — AMPICILLIN SODIUM AND SULBACTAM SODIUM 200 GRAM(S): 250; 125 INJECTION, POWDER, FOR SUSPENSION INTRAMUSCULAR; INTRAVENOUS at 05:49

## 2021-09-22 RX ADMIN — AMPICILLIN SODIUM AND SULBACTAM SODIUM 200 GRAM(S): 250; 125 INJECTION, POWDER, FOR SUSPENSION INTRAMUSCULAR; INTRAVENOUS at 00:49

## 2021-09-22 RX ADMIN — HEPARIN SODIUM 5000 UNIT(S): 5000 INJECTION INTRAVENOUS; SUBCUTANEOUS at 21:12

## 2021-09-22 RX ADMIN — OXYCODONE HYDROCHLORIDE 5 MILLIGRAM(S): 5 TABLET ORAL at 17:48

## 2021-09-22 RX ADMIN — SODIUM CHLORIDE 75 MILLILITER(S): 9 INJECTION, SOLUTION INTRAVENOUS at 07:47

## 2021-09-22 RX ADMIN — MORPHINE SULFATE 2 MILLIGRAM(S): 50 CAPSULE, EXTENDED RELEASE ORAL at 12:07

## 2021-09-22 RX ADMIN — GABAPENTIN 100 MILLIGRAM(S): 400 CAPSULE ORAL at 17:43

## 2021-09-22 RX ADMIN — HEPARIN SODIUM 5000 UNIT(S): 5000 INJECTION INTRAVENOUS; SUBCUTANEOUS at 13:47

## 2021-09-22 NOTE — PROGRESS NOTE ADULT - PROBLEM SELECTOR PLAN 6
plan for OR today, f/u surgical path for clean wound margins PT for evaluation for safe discharge planning  wound VAC in place   f/u surgical path

## 2021-09-22 NOTE — PROGRESS NOTE ADULT - PROBLEM SELECTOR PLAN 7
PT consulted, recommend STEVAN  daughter would like discharge to [facility] as wife is also admitted there

## 2021-09-22 NOTE — PROGRESS NOTE ADULT - SUBJECTIVE AND OBJECTIVE BOX
NP Note discussed with  Primary Attending    Patient is a 76y old  Male who presents with a chief complaint of R Foot Pain (20 Sep 2021 12:56)      INTERVAL HPI/OVERNIGHT EVENTS: no acute events overnight, hemodynamically stable     MEDICATIONS  (STANDING):  acetaminophen   Tablet .. 1000 milliGRAM(s) Oral every 8 hours  ampicillin/sulbactam  IVPB      ampicillin/sulbactam  IVPB 3 Gram(s) IV Intermittent every 6 hours  aspirin  chewable 81 milliGRAM(s) Oral daily  atorvastatin 80 milliGRAM(s) Oral at bedtime  bisacodyl Suppository 10 milliGRAM(s) Rectal once  cyanocobalamin 1000 MICROGram(s) Oral daily  ergocalciferol 80671 Unit(s) Oral <User Schedule>  ferrous    sulfate 325 milliGRAM(s) Oral daily  gabapentin 100 milliGRAM(s) Oral every 12 hours  heparin   Injectable 5000 Unit(s) SubCutaneous every 8 hours  influenza   Vaccine 0.5 milliLiter(s) IntraMuscular once  insulin glargine Injectable (LANTUS) 5 Unit(s) SubCutaneous at bedtime  insulin lispro (ADMELOG) corrective regimen sliding scale   SubCutaneous Before meals and at bedtime  levoFLOXacin IVPB 250 milliGRAM(s) IV Intermittent every 24 hours  levoFLOXacin IVPB      lidocaine 5% Ointment 1 Application(s) Topical daily  lisinopril 10 milliGRAM(s) Oral daily  metoprolol succinate  milliGRAM(s) Oral daily  NIFEdipine XL 60 milliGRAM(s) Oral daily  polyethylene glycol 3350 17 Gram(s) Oral daily  senna 2 Tablet(s) Oral at bedtime    MEDICATIONS  (PRN):  morphine  - Injectable 2 milliGRAM(s) IV Push daily PRN Severe Pain (7 - 10)  oxyCODONE    IR 5 milliGRAM(s) Oral every 4 hours PRN Severe Pain (7 - 10)      __________________________________________________  REVIEW OF SYSTEMS:    CONSTITUTIONAL: No fever,   EYES: no acute visual disturbances  NECK: No pain or stiffness  RESPIRATORY: No cough; No shortness of breath  CARDIOVASCULAR: No chest pain, no palpitations  GASTROINTESTINAL: No pain. No nausea or vomiting; No diarrhea   NEUROLOGICAL: No headache or numbness, no tremors  MUSCULOSKELETAL: No joint pain, no muscle pain  GENITOURINARY: no dysuria, no frequency, no hesitancy  PSYCHIATRY: no depression , no anxiety  ALL OTHER  ROS negative        Vital Signs Last 24 Hrs  T(C): 37.1 (20 Sep 2021 05:00), Max: 37.3 (19 Sep 2021 14:16)  T(F): 98.7 (20 Sep 2021 05:00), Max: 99.2 (19 Sep 2021 21:15)  HR: 81 (20 Sep 2021 05:00) (70 - 81)  BP: 129/54 (20 Sep 2021 05:00) (129/54 - 136/64)  BP(mean): --  RR: 18 (20 Sep 2021 05:00) (18 - 18)  SpO2: 100% (20 Sep 2021 05:00) (98% - 100%)    ________________________________________________  PHYSICAL EXAM:  GENERAL: NAD  HEENT: Normocephalic;  conjunctivae and sclerae clear; moist mucous membranes;   NECK : supple  CHEST/LUNG: Clear to auscultation bilaterally with good air entry   HEART: S1 S2  regular; no murmurs, gallops or rubs  ABDOMEN: Soft, Nontender, Nondistended; Bowel sounds present  EXTREMITIES: right partial ray amputation with + purulent d/c   SKIN: warm and dry; no rash  NERVOUS SYSTEM:  Awake and alert; Oriented  to place, person and time ; no new deficits    _________________________________________________  LABS:                        10.4   6.18  )-----------( 190      ( 20 Sep 2021 07:07 )             32.1     09-20    138  |  109<H>  |  16  ----------------------------<  69<L>  3.9   |  18<L>  |  1.48<H>    Ca    8.7      20 Sep 2021 07:07    TPro  7.1  /  Alb  2.6<L>  /  TBili  0.5  /  DBili  x   /  AST  11  /  ALT  12  /  AlkPhos  103  09-20        CAPILLARY BLOOD GLUCOSE      POCT Blood Glucose.: 75 mg/dL (20 Sep 2021 11:44)  POCT Blood Glucose.: 83 mg/dL (20 Sep 2021 08:18)  POCT Blood Glucose.: 79 mg/dL (20 Sep 2021 06:23)  POCT Blood Glucose.: 114 mg/dL (19 Sep 2021 21:08)  POCT Blood Glucose.: 125 mg/dL (19 Sep 2021 16:50)        RADIOLOGY & ADDITIONAL TESTS:    Imaging  Reviewed:  YES  < from: MR Foot No Cont, Right (09.17.21 @ 13:04) >  Impression:  Resection at the mid aspect of the fifth metatarsal. Lateral soft tissue wound with marrow signal abnormality throughout the fifth metatarsal and within the adjacent fourth metatarsal head which is nonspecific in the setting of recent surgery although osteomyelitis is suspected.        Consultant(s) Notes Reviewed:   YES      Plan of care was discussed with patient and /or primary care giver; all questions and concerns were addressed  NP Note discussed with  Primary Attending    Patient is a 76y old  Male who presents with a chief complaint of R Foot Pain (20 Sep 2021 12:56)      INTERVAL HPI/OVERNIGHT EVENTS: no acute events overnight, hemodynamically stable     MEDICATIONS  (STANDING):  acetaminophen   Tablet .. 1000 milliGRAM(s) Oral every 8 hours  ampicillin/sulbactam  IVPB      ampicillin/sulbactam  IVPB 3 Gram(s) IV Intermittent every 6 hours  aspirin  chewable 81 milliGRAM(s) Oral daily  atorvastatin 80 milliGRAM(s) Oral at bedtime  bisacodyl Suppository 10 milliGRAM(s) Rectal once  cyanocobalamin 1000 MICROGram(s) Oral daily  ergocalciferol 53470 Unit(s) Oral <User Schedule>  ferrous    sulfate 325 milliGRAM(s) Oral daily  gabapentin 100 milliGRAM(s) Oral every 12 hours  heparin   Injectable 5000 Unit(s) SubCutaneous every 8 hours  influenza   Vaccine 0.5 milliLiter(s) IntraMuscular once  insulin glargine Injectable (LANTUS) 5 Unit(s) SubCutaneous at bedtime  insulin lispro (ADMELOG) corrective regimen sliding scale   SubCutaneous Before meals and at bedtime  levoFLOXacin IVPB 250 milliGRAM(s) IV Intermittent every 24 hours  levoFLOXacin IVPB      lidocaine 5% Ointment 1 Application(s) Topical daily  lisinopril 10 milliGRAM(s) Oral daily  metoprolol succinate  milliGRAM(s) Oral daily  NIFEdipine XL 60 milliGRAM(s) Oral daily  polyethylene glycol 3350 17 Gram(s) Oral daily  senna 2 Tablet(s) Oral at bedtime    MEDICATIONS  (PRN):  morphine  - Injectable 2 milliGRAM(s) IV Push daily PRN Severe Pain (7 - 10)  oxyCODONE    IR 5 milliGRAM(s) Oral every 4 hours PRN Severe Pain (7 - 10)      __________________________________________________  REVIEW OF SYSTEMS:    CONSTITUTIONAL: No fever,   EYES: no acute visual disturbances  NECK: No pain or stiffness  RESPIRATORY: No cough; No shortness of breath  CARDIOVASCULAR: No chest pain, no palpitations  GASTROINTESTINAL: No pain. No nausea or vomiting; No diarrhea   NEUROLOGICAL: No headache or numbness, no tremors  MUSCULOSKELETAL: No joint pain, no muscle pain  GENITOURINARY: no dysuria, no frequency, no hesitancy  PSYCHIATRY: no depression , no anxiety  ALL OTHER  ROS negative        Vital Signs Last 24 Hrs  T(C): 37.1 (20 Sep 2021 05:00), Max: 37.3 (19 Sep 2021 14:16)  T(F): 98.7 (20 Sep 2021 05:00), Max: 99.2 (19 Sep 2021 21:15)  HR: 81 (20 Sep 2021 05:00) (70 - 81)  BP: 129/54 (20 Sep 2021 05:00) (129/54 - 136/64)  BP(mean): --  RR: 18 (20 Sep 2021 05:00) (18 - 18)  SpO2: 100% (20 Sep 2021 05:00) (98% - 100%)    ________________________________________________  PHYSICAL EXAM:  GENERAL: NAD  HEENT: Normocephalic;  conjunctivae and sclerae clear; moist mucous membranes;   NECK : supple  CHEST/LUNG: Clear to auscultation bilaterally with good air entry   HEART: +TRINIDAD, S1 S2, no heaves, gallops or rubs   ABDOMEN: Soft, Nontender, Nondistended; Bowel sounds present  EXTREMITIES: right partial ray amputation with + purulent d/c   SKIN: warm and dry; no rash  NERVOUS SYSTEM:  Awake and alert; Oriented  to place, person and time ; no new deficits    _________________________________________________  LABS:                        10.4   6.18  )-----------( 190      ( 20 Sep 2021 07:07 )             32.1     09-20    138  |  109<H>  |  16  ----------------------------<  69<L>  3.9   |  18<L>  |  1.48<H>    Ca    8.7      20 Sep 2021 07:07    TPro  7.1  /  Alb  2.6<L>  /  TBili  0.5  /  DBili  x   /  AST  11  /  ALT  12  /  AlkPhos  103  09-20        CAPILLARY BLOOD GLUCOSE      POCT Blood Glucose.: 75 mg/dL (20 Sep 2021 11:44)  POCT Blood Glucose.: 83 mg/dL (20 Sep 2021 08:18)  POCT Blood Glucose.: 79 mg/dL (20 Sep 2021 06:23)  POCT Blood Glucose.: 114 mg/dL (19 Sep 2021 21:08)  POCT Blood Glucose.: 125 mg/dL (19 Sep 2021 16:50)        RADIOLOGY & ADDITIONAL TESTS:    Imaging  Reviewed:  YES  < from: MR Foot No Cont, Right (09.17.21 @ 13:04) >  Impression:  Resection at the mid aspect of the fifth metatarsal. Lateral soft tissue wound with marrow signal abnormality throughout the fifth metatarsal and within the adjacent fourth metatarsal head which is nonspecific in the setting of recent surgery although osteomyelitis is suspected.        Consultant(s) Notes Reviewed:   YES      Plan of care was discussed with patient and /or primary care giver; all questions and concerns were addressed

## 2021-09-22 NOTE — PROGRESS NOTE ADULT - PROBLEM SELECTOR PLAN 4
OREN superimposed on Stage III CKD --> likely prerenal  check urine lytes to calculat FENa, c/w LR gtt   avoid nephrotoxic agents, dose as per GFR  monitor creatinine, electrolytes OREN superimposed on Stage III CKD  Creatinine 2.38 this AM with bicarb 18  check urine lytes to calculat FENa, c/w IVF for now   avoid nephrotoxic agents, dose as per GFR  monitor creatinine, electrolytes  Nephrology Dr. Rodriguez consulted OREN superimposed on Stage III CKD  Creatinine 2.38 this AM with bicarb 18  check urine lytes to calculate FENa, c/w IVF for now   avoid nephrotoxic agents, dose as per GFR  monitor creatinine, electrolytes  holding Lisinopril  Nephrology Dr. Rodriguez consulted OREN superimposed on Stage III CKD  Creatinine 2.38 this AM with bicarb 18  check urine lytes to calculate FENa, c/w IVF for 12 hours   avoid nephrotoxic agents, dose as per GFR  monitor creatinine, electrolytes  holding Lisinopril, Nifedipine incr. 90 mg daily   f/u C3/ C4 and ASO to r/o PIGN  renal ultrasound   bladder scan to r/o retention  Nephrology Dr. Rodriguez consulted OREN superimposed on Stage III CKD  Creatinine 2.38 this AM with bicarb 18  check urine lytes to calculate FENa, c/w IVF for 12 hours   avoid nephrotoxic agents, dose as per GFR  monitor creatinine, electrolytes  holding Lisinopril, Nifedipine incr. 90 mg daily   f/u C3/ C4 and ASO to r/o PIGN  f/u urine eosinophils to r/o AIN  renal ultrasound   bladder scan to r/o retention  Nephrology Dr. Rodriguez consulted CAD s/p CABG, severe aortic stenosis --> patient will need TAVR, SABIHA after infectious workup is complete  continue ASA, lipitor, BB, ACEi  EF 55-60%, GIDD   Cardio: Dr. Wilkins

## 2021-09-22 NOTE — PROGRESS NOTE ADULT - PROBLEM SELECTOR PLAN 2
poorly controlled  A1C 11.4  Lantus QHS, ISS as per protocol  accuchecks AC, HS OREN superimposed on Stage III CKD  concern for AIN 2/2 antibiotics   Creatinine 2.38 this AM with bicarb 18  check urine lytes to calculate FENa, c/w IVF for 12 hours   avoid nephrotoxic agents, dose as per GFR  monitor creatinine, electrolytes  holding Lisinopril, Nifedipine incr. 90 mg daily   f/u C3/ C4 and ASO to r/o PIGN  f/u urine eosinophils to r/o AIN  renal ultrasound   bladder scan to r/o retention  Nephrology Dr. Rodriguez consulted

## 2021-09-22 NOTE — PROGRESS NOTE ADULT - ASSESSMENT
A:   Right 5th ray wound dehiscence  s/p R 5th ray resection - 8/19     P:  Patient evaluated, chart reviewed  Explained all clinical findings to the patient and the daughter of the patient to their satisfaction  Answered all questions and concerns of the patient and the patient's daughter to their satisfaction   Xrays reviewed  ESR/CRP reviewed   MRI reviewed   Written consent was obtained for surgical management of R 5th wound debridement, graft application, bone biopsy and wound vac application   Pt aware of procedure and related risks associated   PT scheduled for surgery 745AM with Dr. Vazquez  Pt NPO since midnight 9/22  Covid PCR Negative 9/21  Appreciate medical optimization  Appreciate ID consult   Podiatry will follow while in house  Discussed with Dr. Vazquez

## 2021-09-22 NOTE — PROGRESS NOTE ADULT - PROBLEM SELECTOR PLAN 1
as a consequence of poorly controlled diabetes   continue with Unasyn, Levofloxacin  afebrile,  no leukocytosis  plan for OR for wound debridement today  local wound care as per podiatry   ESR, CRP elevated   Espinoza Perioperative Risk Score 2.1%  Dr. Wilkins consulted for cardiac clearance  ID: Dr. Barrett  pain management following  Podiatry onboard as a consequence of poorly controlled diabetes   continue with Unasyn, Levofloxacin  afebrile,  no leukocytosis  plan for OR for wound debridement today  local wound care as per podiatry   ESR, CRP elevated   Espinoza Perioperative Risk Score 2.1%  Dr. Wilkins consulted for cardiac clearance  ID: Dr. Barrett  pain management following  wound VAC in place   Podiatry onboard as a consequence of poorly controlled diabetes   continue with Unasyn, Levofloxacin --> renally dosed   afebrile,  no leukocytosis  plan for OR for wound debridement today  local wound care as per podiatry   ESR, CRP elevated   Espinoza Perioperative Risk Score 2.1%  Dr. Wilkins consulted for cardiac clearance  ID: Dr. Barrett  pain management following  wound VAC in place   Podiatry onboard

## 2021-09-22 NOTE — PROGRESS NOTE ADULT - ATTENDING COMMENTS
pt for operating room for debridement , application of graft and placement of vac   pt understands surgery - risks and potential complications   consented for surgery

## 2021-09-22 NOTE — PROGRESS NOTE ADULT - SUBJECTIVE AND OBJECTIVE BOX
Podiatry Interval HPI: Pt seen bedside AAOx3. Pt is amendable for surgery this morning for R 5th wound debridement, graft application, bone biopsy and wound vac application. Pt is aware of all risks of procedure. Pts daughter Korin was called and made aware of procedure this morning. Pt has no concerns at this time. PT is medically optimized for surgery this AM. Denies constitutional symptoms. No other pedal complaints.     Podiatry HPI: 76 year old male with a PMHx of DM, HTN, HLD, CAD to ED presents to the ED for a Right Foot s/p 5th foot partial ray resection and fillet toe flap. Patient states that he has sharp localized non-radiating pain to the Right foot 5th metatarsal. States that after his surgery, he did not see a podiatrist and he came into the ED because he saw drainage and was having pain. Denies any constitutional symptoms of N/V/C/F/SOB. Denies any other pedal complaints at this time. Denies calf pain today, bilaterally.    Medications ampicillin/sulbactam  IVPB      ampicillin/sulbactam  IVPB 3 Gram(s) IV Intermittent every 6 hours  aspirin  chewable 81 milliGRAM(s) Oral daily  atorvastatin 80 milliGRAM(s) Oral at bedtime  bisacodyl Suppository 10 milliGRAM(s) Rectal once  chlorhexidine 2% Cloths 1 Application(s) Topical daily  cyanocobalamin 1000 MICROGram(s) Oral daily  ergocalciferol 67883 Unit(s) Oral <User Schedule>  ferrous    sulfate 325 milliGRAM(s) Oral daily  gabapentin 100 milliGRAM(s) Oral every 12 hours  influenza   Vaccine 0.5 milliLiter(s) IntraMuscular once  insulin glargine Injectable (LANTUS) 5 Unit(s) SubCutaneous at bedtime  insulin lispro (ADMELOG) corrective regimen sliding scale   SubCutaneous Before meals and at bedtime  levoFLOXacin IVPB 250 milliGRAM(s) IV Intermittent every 24 hours  levoFLOXacin IVPB      lidocaine 5% Ointment 1 Application(s) Topical daily  lisinopril 10 milliGRAM(s) Oral daily  metoprolol succinate  milliGRAM(s) Oral daily  morphine  - Injectable 2 milliGRAM(s) IV Push daily PRN  NIFEdipine XL 60 milliGRAM(s) Oral daily  oxyCODONE    IR 5 milliGRAM(s) Oral every 4 hours PRN  polyethylene glycol 3350 17 Gram(s) Oral daily  senna 2 Tablet(s) Oral at bedtime    FHFamily history of acute myocardial infarction (Father)    Family history of diabetes mellitus (Mother, Sibling)    Family history of hypertension (Mother, Sibling)    Family history of hyperlipidemia (Mother, Sibling)    ,   PMHHTN (hypertension)    HLD (hyperlipidemia)    DM (diabetes mellitus)    CAD (coronary artery disease)    Glaucoma, angle-closure       PSHNo significant past surgical history    S/P CABG (coronary artery bypass graft)    History of thyroid surgery        Labs                          9.9    5.46  )-----------( 219      ( 22 Sep 2021 06:36 )             30.9      09-22    141  |  109<H>  |  x   ----------------------------<  85  4.1   |  x   |  x     Ca    8.8      22 Sep 2021 06:36       Vital Signs Last 24 Hrs  T(C): 36.9 (22 Sep 2021 04:45), Max: 36.9 (22 Sep 2021 04:45)  T(F): 98.5 (22 Sep 2021 04:45), Max: 98.5 (22 Sep 2021 04:45)  HR: 82 (22 Sep 2021 04:45) (73 - 82)  BP: 134/60 (22 Sep 2021 04:45) (134/60 - 137/56)  BP(mean): --  RR: 16 (22 Sep 2021 04:45) (16 - 18)  SpO2: 99% (22 Sep 2021 04:45) (99% - 100%)  Sedimentation Rate, Erythrocyte: 77 mm/Hr (09-16-21 @ 16:03)  Sedimentation Rate, Erythrocyte: 91 mm/Hr (08-22-21 @ 15:41)         C-Reactive Protein, Serum: 45 mg/L (09-16-21 @ 23:33)  C-Reactive Protein, Serum: 85 mg/L (08-22-21 @ 21:30)  C-Reactive Protein, Serum: 67 mg/L (08-15-21 @ 11:55)   WBC Count: 5.46 K/uL (09-22-21 @ 06:36)    PHYSICAL EXAM  GEN: SHER ROBERT is a pleasant well-nourished, well developed 76y Male in no acute distress, alert awake, and oriented to person, place and time.   LE Focused:    Vasc:  DP/PT pulses were faintly palpable, bilateral. DP/PT pulses were monophasic on doppler, bilaterally. CFT<3 seconds to all digits.   Derm: Surgical Incision site dehiscence noted to the lateral aspect of the right foot approx (7.5cm x 3.5cm x 2.5cm), tunnels to the proximal aspect to the bone, +PTB, hyperpigmentation surrounding the wound, erythematous when compared to the contralateral side, no drainage. No malodor noted. Fibrogranular wound base noted. No streaking noted.   Neuro: Protective sensation diminished, b/l  MSK: +5/5 muscle strength noted to the pedal compartments. 5th digit amputation on right foot, noted.     Imaging:  INTERPRETATION:  Postop, rule out infection.    3 views right foot. Prior 8/20/2021.     Status post resection of the fifth toe and distal fifth metatarsal unchanged in appearance. No focal bone lysis or unusual periosteal reaction to suggest osteomyelitis. No acute fracture or dislocation. The remainder study unchanged. No soft tissue gas.    IMPRESSION: No acute finding. No plain film evidence of osteomyelitis.        MRI R foot:     INTERPRETATION:  Clinical Information: Recent fifth metatarsal head resection now with fifth digit pain, swelling and foul discharge.    Comparison: Radiographs of the right foot from 9/16/2021 and MRI the right foot from 8/16/2021.    Technique:  MRI of the right midfoot and forefoot.  Intravenous Contrast: None.    Findings:    There is a resection at the mid aspect of the fifth metatarsal shaft. There is a lateral soft tissue wound beginning at the level of the amputation which extends distally. There is susceptibility artifact in the region of the amputation consistent with postoperative change. There is hyperintense T2 marrow signal throughout the remaining fifth metatarsal and within the adjacent fourth metatarsal head which is nonspecific and could be related to recent postoperative changes although osteomyelitis is suspected.    There is edema and mild atrophy within the plantar muscles of the foot. There is minimal spurring at the first metatarsophalangeal and hallux sesamoid articulations.    Impression:  Resection at the mid aspect of the fifth metatarsal. Lateral soft tissue wound with marrow signal abnormality throughout the fifth metatarsal and within the adjacent fourth metatarsal head which is nonspecific in the setting of recent surgery although osteomyelitis is suspected.      Culture Results:   Rare Aeromonas hydrophila/caviae   Few Klebsiella oxytoca/Raoutella ornithinolytica   Few Enterococcus faecalis   Few Streptococcus agalactiae (Group B) isolated   Group B streptococci are susceptible to ampicillin,   penicillin and cefazolin, but may be resistant to   erythromycin and clindamycin.   Recommendations for intrapartum prophylaxis for Group B   streptococci are penicillin or ampicillin. (09.16.21 @ 21:42)

## 2021-09-22 NOTE — PROGRESS NOTE ADULT - ASSESSMENT
76 year old male with past medical history of poorly controlled IDDM, HTN, HLD, CAD s/p CABG and recent right partial 5th ray amputation (8/19)/21) who presented to ED with c/o right foot pain associated with discharge and foul odor. He tried taking Tylenol for the pain with minimal relief. Of note, the patient never followed up with podiatry after his procedure in August. MRI confirms suspected osteomyelitis - patient followed by podiatry and ID, patient for OR today for debridement and possible VAC placement. Patient currently on course of Unasyn and Levofloxacin, ID to follow up final surgical path.        76 year old male with past medical history of poorly controlled IDDM, HTN, HLD, CAD s/p CABG and recent right partial 5th ray amputation (8/19)/21) who presented to ED with c/o right foot pain associated with discharge and foul odor. He tried taking Tylenol for the pain with minimal relief. Of note, the patient never followed up with podiatry after his procedure in August. MRI confirms suspected osteomyelitis - patient followed by podiatry and ID, patient for OR today for debridement and possible VAC placement. Patient currently on course of Unasyn and Levofloxacin, ID to follow up final surgical path. Patient with creatinine elevation of 2.38 this AM, nephrology Dr. Rodriguez consulted.

## 2021-09-22 NOTE — PROGRESS NOTE ADULT - PROBLEM SELECTOR PLAN 3
CAD s/p CABG, severe aortic stenosis --> patient will need TAVR, SABIHA after infectious workup is complete  continue ASA, lipitor, BB, ACEi  EF 55-60%, GIDD   Cardio: Dr. Wilkins poorly controlled  A1C 11.4  Lantus QHS, ISS as per protocol  accuchecks AC, HS

## 2021-09-22 NOTE — PROGRESS NOTE ADULT - SUBJECTIVE AND OBJECTIVE BOX
Patient is seen and examined at the bed side, is afebrile. He is s/p Right 5th wound debridement, graft application, bone biopsy and wound vac application today, tolerated the procedure  well.   The creatinine is trending back up..        REVIEW OF SYSTEMS: All other review systems are negative      ALLERGIES: No Known Allergies      Vital Signs Last 24 Hrs  T(C): 36.4 (22 Sep 2021 10:59), Max: 37.2 (22 Sep 2021 09:13)  T(F): 97.6 (22 Sep 2021 10:59), Max: 99 (22 Sep 2021 09:13)  HR: 79 (22 Sep 2021 10:59) (74 - 88)  BP: 159/65 (22 Sep 2021 10:59) (130/65 - 159/65)  BP(mean): 91 (22 Sep 2021 10:08) (84 - 91)  RR: 18 (22 Sep 2021 10:59) (12 - 23)  SpO2: 100% (22 Sep 2021 10:59) (95% - 100%)      PHYSICAL EXAM:  GENERAL: Not in distress   CHEST/LUNG:  Not using accessory muscles  HEART: s1 and s2 present  ABDOMEN:  Nontender and  Nondistended  EXTREMITIES: Right foot bandage in placed   CNS: Awake and Alert      LABS:                        9.9    5.46  )-----------( 219      ( 22 Sep 2021 06:36 )             30.9                           10.1   5.54  )-----------( 193      ( 21 Sep 2021 06:22 )             31.0               09-22    141  |  109<H>  |  25<H>  ----------------------------<  94  4.2   |  21<L>  |  2.52<H>    Ca    9.2      22 Sep 2021 11:54      09-21    141  |  109<H>  |  15  ----------------------------<  69<L>  3.9   |  20<L>  |  1.42<H>    Ca    8.8      21 Sep 2021 06:22    TPro  7.1  /  Alb  2.6<L>  /  TBili  0.5  /  DBili  x   /  AST  11  /  ALT  12  /  AlkPhos  103  09-20 09-19    137  |  107  |  17  ----------------------------<  82  4.1   |  22  |  1.85<H>    Ca    9.1      19 Sep 2021 06:28    TPro  7.2  /  Alb  2.7<L>  /  TBili  0.6  /  DBili  x   /  AST  10  /  ALT  13  /  AlkPhos  97  09-19        CAPILLARY BLOOD GLUCOSE  POCT Blood Glucose.: 134 mg/dL (17 Sep 2021 17:11)  POCT Blood Glucose.: 128 mg/dL (17 Sep 2021 11:06)  POCT Blood Glucose.: 157 mg/dL (17 Sep 2021 04:10)      MEDICATIONS  (STANDING):    ampicillin/sulbactam  IVPB 3 Gram(s) IV Intermittent every 12 hours  atorvastatin 80 milliGRAM(s) Oral at bedtime  bisacodyl Suppository 10 milliGRAM(s) Rectal once  cyanocobalamin 1000 MICROGram(s) Oral daily  ergocalciferol 73246 Unit(s) Oral <User Schedule>  ferrous    sulfate 325 milliGRAM(s) Oral daily  gabapentin 100 milliGRAM(s) Oral every 12 hours  heparin   Injectable 5000 Unit(s) SubCutaneous every 8 hours  influenza   Vaccine 0.5 milliLiter(s) IntraMuscular once  insulin glargine Injectable (LANTUS) 5 Unit(s) SubCutaneous at bedtime  insulin lispro (ADMELOG) corrective regimen sliding scale   SubCutaneous Before meals and at bedtime  lactated ringers. 1000 milliLiter(s) (75 mL/Hr) IV Continuous <Continuous>  levoFLOXacin IVPB 750 milliGRAM(s) IV Intermittent every 48 hours  metoprolol succinate  milliGRAM(s) Oral daily  NIFEdipine XL 90 milliGRAM(s) Oral daily  polyethylene glycol 3350 17 Gram(s) Oral daily  senna 2 Tablet(s) Oral at bedtime      RADIOLOGY & ADDITIONAL TESTS:    9/17/21 : MR Foot No Cont, Right (09.17.21 @ 13:04) : Resection at the mid aspect of the fifth metatarsal. Lateral soft tissue wound with marrow signal abnormality throughout the fifth metatarsal and within the adjacent fourth metatarsal head which is nonspecific in the setting of recent surgery although osteomyelitis is suspected.    9/16/21 : Xray Foot AP + Lateral + Oblique, Right (09.16.21 @ 15:03) : No acute finding. No plain film evidence of osteomyelitis.        MICROBIOLOGY DATA:    COVID-19 David Domain Antibody (09.18.21 @ 12:55)   COVID-19 David Domain Antibody Result: >250.00:    Culture - Blood (09.17.21 @ 10:28)   Specimen Source: .Blood Blood-Peripheral   Culture Results: No growth to date.     Culture - Blood (09.17.21 @ 10:28)   Specimen Source: .Blood Blood-Venous   Culture Results: No growth to date.     Culture - Surgical Swab (09.16.21 @ 21:42)   - Amikacin: S <=16   - Amikacin: S <=16   - Amoxicillin/Clavulanic Acid: S <=8/4   - Ampicillin: R >16 These ampicillin results predict results for amoxicillin   - Ampicillin: S <=2 Predicts results to ampicillin/sulbactam, amoxacillin-clavulanate and piperacillin-tazobactam.   - Ampicillin/Sulbactam: S 8/4 Enterobacter, Citrobacter, and Serratia may develop resistance during prolonged therapy (3-4 days)   - Ampicillin/Sulbactam: R >16/8   - Aztreonam: S <=4   - Aztreonam: S <=4   - Cefazolin: R 16 Enterobacter, Citrobacter, and Serratia may develop resistance during prolonged therapy (3-4 days)   - Cefazolin: R >16   - Cefepime: S <=2   - Cefepime: S <=2   - Cefoxitin: S <=8   - Cefoxitin: S <=8   - Ceftazidime: S <=1   - Ceftriaxone: S <=1   - Ceftriaxone: S <=1 Enterobacter, Citrobacter, and Serratia may develop resistance during prolonged therapy   - Ciprofloxacin: S <=0.25   - Ciprofloxacin: S <=0.25   - Ertapenem: S <=0.5   - Gentamicin: S <=2   - Gentamicin: S <=2   - Imipenem: S <=1   - Levofloxacin: S <=0.5   - Levofloxacin: S <=0.5   - Meropenem: S <=1   - Meropenem: S <=1   - Piperacillin/Tazobactam: S <=8   - Piperacillin/Tazobactam: S <=8   - Tetra/Doxy: S 4   - Tobramycin: S <=2   - Trimethoprim/Sulfamethoxazole: S <=0.5/9.5   - Trimethoprim/Sulfamethoxazole: S <=0.5/9.5   - Vancomycin: S 2   Specimen Source: .Surgical Swab right foot wound   Culture Results:   Culture yields >4 types of aerobic and/or anaerobic bacteria   Call client services within 7 days if further workup is clinically   indicated. Culture includes   Rare Aeromonas hydrophila/caviae   Few Klebsiella oxytoca/Raoutella ornithinolytica   Few Enterococcus faecalis   Few Streptococcus agalactiae (Group B) isolated   Group B streptococci are susceptible to ampicillin,   penicillin and cefazolin, but may be resistant to   erythromycin and clindamycin.   Recommendations for intrapartum prophylaxis for Group B   streptococci are penicillin or ampicillin.   Organism Identification: Aeromonas hydrophila/caviae   Klebsiella oxytoca /Raoutella ornithinolytica   Enterococcus faecalis   Organism: Aeromonas hydrophila/caviae   Organism: Klebsiella oxytoca /Raoutella ornithinolytica   Organism: Enterococcus faecalis   COVID-19 PCR . (09.16.21 @ 22:24)   COVID-19 PCR: NotDetec:

## 2021-09-22 NOTE — CONSULT NOTE ADULT - ASSESSMENT
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 s/p OR debridement today. Nephrology consulted for abrupt rise in SCr.     1. OREN- likely ATN in the setting of infection and ACEi use. Lisinopril discontinued. Recc post void bladder scan to r/o retention.   Check UA and urine lytes. Pt on LR @75ml/hr; recc to stop after 12 hrs. Check Renal US  Recc to check CBC with diff to r/o AIN. Recc to check C3/ C4 and ASO to r/o PIGN. Strict I/Os. Avoid nephrotoxins/ NSAIDs/ RCA. Monitor BMP.  2. 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 Will defer secondary w/u as an outpt. Avoid nephrotoxins  3. HTN 2/2 CKD- BP elevated. Lisinopril d/c today due to OREN. Recc to increase Nifedipine ER to 90mg PO qd, titrate as needed. Monitor BP  4. Acute osteomyelitis- s/p debridement today. Pt on Unasyn/ Levaquin renally dosed. Plan as per ID    [facility]  Bryn Johnson M.D.  Domingo Booth D.O.  Zakia Rodriguez M.D.  Zonia Adams, MSN, ANP-C  [phone]    [address] [place] [address]

## 2021-09-22 NOTE — PROGRESS NOTE ADULT - ASSESSMENT
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  As per daughter, patient was taking tylenol which did not help his pain prompting the patient to ask his daughter to take him to the hospital. ON admission, he has no fever or Leukocytosis. The Xray of Right foot shows no Osteomyelitis but MRI of Right foot shows Lateral soft tissue wound with marrow signal abnormality throughout the fifth metatarsal which is consistent of Osteomyelitis. He has seen by Podiatry and wound culture has sent, Zosyn and Vancomycin has started. The ID consult requested to assist with further evaluation and antibiotic management.     # Right Fifth metatarsal DFU with drainage and Osteomyelitis- wound cx grew Enterococcus, Aeromonas and Klebsiella - Zosyn is the ideal to cover All organisms but kidney function is worsening, hence change to Unasyn and Levaquin until kidney function is improved     would recommend:    1. Follow up intra-op deep tissue culture   2. Adjust Unasyn and  Levaquin  based on crcl  3. Monitor Kidney function and adjust Abx doses accordingly  4. Wound care as per Podiatry  5. Pain management as needed    d/w patient and Nursing staff     Attending Attestation:    Spent more than 35 minutes on total encounter, more than 50 % of the visit was spent counseling and/or coordinating care by the Attending physician.

## 2021-09-22 NOTE — CONSULT NOTE ADULT - SUBJECTIVE AND OBJECTIVE BOX
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 s/p OR debridement today. Nephrology consulted for abrupt rise in SCr.     Pt denies any h/o kidney disease. Pt poor historian and denies any complaints; denies any Rt foot pain, SOB, chest pain, n/v/d, abd pain or urinary complaints.       PAST MEDICAL & SURGICAL HISTORY:  HTN (hypertension)    HLD (hyperlipidemia)    DM (diabetes mellitus)    CAD (coronary artery disease)    Glaucoma, angle-closure    S/P CABG (coronary artery bypass graft)    History of thyroid surgery      No Known Allergies    Home Medications Reviewed  Hospital Medications:   MEDICATIONS  (STANDING):  ampicillin/sulbactam  IVPB 3 Gram(s) IV Intermittent every 12 hours  atorvastatin 80 milliGRAM(s) Oral at bedtime  bisacodyl Suppository 10 milliGRAM(s) Rectal once  cyanocobalamin 1000 MICROGram(s) Oral daily  ergocalciferol 13812 Unit(s) Oral <User Schedule>  ferrous    sulfate 325 milliGRAM(s) Oral daily  gabapentin 100 milliGRAM(s) Oral every 12 hours  heparin   Injectable 5000 Unit(s) SubCutaneous every 8 hours  influenza   Vaccine 0.5 milliLiter(s) IntraMuscular once  insulin glargine Injectable (LANTUS) 5 Unit(s) SubCutaneous at bedtime  insulin lispro (ADMELOG) corrective regimen sliding scale   SubCutaneous Before meals and at bedtime  lactated ringers. 1000 milliLiter(s) (75 mL/Hr) IV Continuous <Continuous>  levoFLOXacin IVPB 750 milliGRAM(s) IV Intermittent every 48 hours  metoprolol succinate  milliGRAM(s) Oral daily  NIFEdipine XL 60 milliGRAM(s) Oral daily  polyethylene glycol 3350 17 Gram(s) Oral daily  senna 2 Tablet(s) Oral at bedtime    SOCIAL HISTORY:  Denies ETOh, Smoking, or drug use    FAMILY HISTORY:  Family history of acute myocardial infarction (Father)    Family history of diabetes mellitus (Mother, Sibling)    Family history of hypertension (Mother, Sibling)    Family history of hyperlipidemia (Mother, Sibling)        REVIEW OF SYSTEMS:  Gen: no changes in weight  HEENT: no rhinorrhea  Neck: no sore throat  Cards: no chest pain  Resp: no dyspnea  GI: no nausea or vomiting or diarrhea  : no dysuria or hematuria  Vascular: no LE edema  Derm: no rashes  Neuro: no numbness/tingling  All other review of systems is negative unless indicated above.    VITALS:  T(F): 97.6 (09-22-21 @ 10:59), Max: 99 (09-22-21 @ 09:13)  HR: 79 (09-22-21 @ 10:59)  BP: 159/65 (09-22-21 @ 10:59)  RR: 18 (09-22-21 @ 10:59)  SpO2: 100% (09-22-21 @ 10:59)  Wt(kg): --    09-22 @ 07:01  -  09-22 @ 13:24  --------------------------------------------------------  IN: 75 mL / OUT: 0 mL / NET: 75 mL        PHYSICAL EXAM:  Gen: NAD, calm  HEENT: MMM  Neck: no JVD  Cards: RRR, +S1/S2, +TRINIDAD  Resp: CTA B/L  GI: soft, NT/ND, NABS  : no CVA tenderness  Extremities: no LE edema B/L  Derm: Rt foot wrapped/ wound vac  Neuro: non-focal    LABS:  09-22    141  |  109<H>  |  25<H>  ----------------------------<  94  4.2   |  21<L>  |  2.52<H>    Ca    9.2      22 Sep 2021 11:54      Creatinine Trend: 2.52 <--, 2.38 <--, 1.42 <--, 1.48 <--, 1.85 <--, 1.48 <--, 1.44 <--, 1.70 <--                        9.9    5.46  )-----------( 219      ( 22 Sep 2021 06:36 )             30.9     Urine Studies:    Sodium, Random Urine: 93 mmol/L (09-17 @ 17:19)  Creatinine, Random Urine: 65 mg/dL (09-17 @ 17:19)    RADIOLOGY & ADDITIONAL STUDIES:

## 2021-09-22 NOTE — PROGRESS NOTE ADULT - ATTENDING COMMENTS
Patient seen without complaints. Denies chest pain or shortness of breath. Pain controlled. No respiratory distress, lungs CTAB, no LE edema. Patient went to OR today for debridement and bone culture. Continue unasyn and levaquin for osteomyelitis as per ID, f/u wound culture for sensitivities. Cr elevated to 2.58 today, confirmed on repeat BMP. Nephrology consult for ARF. Re-dose abx and medications for CrCl<30. F/u urine studies, RBUS, Urine eosinophils. When anti-biotics chosen, patient will need PICC line for prolonged abx and discharge to [facility].

## 2021-09-23 DIAGNOSIS — R33.8 OTHER RETENTION OF URINE: ICD-10-CM

## 2021-09-23 LAB
ANION GAP SERPL CALC-SCNC: 14 MMOL/L — SIGNIFICANT CHANGE UP (ref 5–17)
ASO AB SER QL: <20 IU/ML — SIGNIFICANT CHANGE UP (ref 0–199)
BASOPHILS # BLD AUTO: 0.02 K/UL — SIGNIFICANT CHANGE UP (ref 0–0.2)
BASOPHILS NFR BLD AUTO: 0.4 % — SIGNIFICANT CHANGE UP (ref 0–2)
BUN SERPL-MCNC: 30 MG/DL — HIGH (ref 7–18)
C3 SERPL-MCNC: 101 MG/DL — SIGNIFICANT CHANGE UP (ref 81–157)
C4 SERPL-MCNC: 47 MG/DL — HIGH (ref 13–39)
CALCIUM SERPL-MCNC: 8.6 MG/DL — SIGNIFICANT CHANGE UP (ref 8.4–10.5)
CHLORIDE SERPL-SCNC: 109 MMOL/L — HIGH (ref 96–108)
CO2 SERPL-SCNC: 19 MMOL/L — LOW (ref 22–31)
CREAT SERPL-MCNC: 3.2 MG/DL — HIGH (ref 0.5–1.3)
EOSINOPHIL # BLD AUTO: 0.25 K/UL — SIGNIFICANT CHANGE UP (ref 0–0.5)
EOSINOPHIL NFR BLD AUTO: 5.2 % — SIGNIFICANT CHANGE UP (ref 0–6)
EOSINOPHIL NFR URNS MANUAL: NEGATIVE — SIGNIFICANT CHANGE UP
GLUCOSE BLDC GLUCOMTR-MCNC: 102 MG/DL — HIGH (ref 70–99)
GLUCOSE BLDC GLUCOMTR-MCNC: 123 MG/DL — HIGH (ref 70–99)
GLUCOSE BLDC GLUCOMTR-MCNC: 126 MG/DL — HIGH (ref 70–99)
GLUCOSE BLDC GLUCOMTR-MCNC: 127 MG/DL — HIGH (ref 70–99)
GLUCOSE SERPL-MCNC: 98 MG/DL — SIGNIFICANT CHANGE UP (ref 70–99)
HCT VFR BLD CALC: 28.6 % — LOW (ref 39–50)
HGB BLD-MCNC: 9.3 G/DL — LOW (ref 13–17)
IMM GRANULOCYTES NFR BLD AUTO: 0.4 % — SIGNIFICANT CHANGE UP (ref 0–1.5)
LACTATE SERPL-SCNC: 0.6 MMOL/L — LOW (ref 0.7–2)
LYMPHOCYTES # BLD AUTO: 0.79 K/UL — LOW (ref 1–3.3)
LYMPHOCYTES # BLD AUTO: 16.3 % — SIGNIFICANT CHANGE UP (ref 13–44)
MCHC RBC-ENTMCNC: 29.3 PG — SIGNIFICANT CHANGE UP (ref 27–34)
MCHC RBC-ENTMCNC: 32.5 GM/DL — SIGNIFICANT CHANGE UP (ref 32–36)
MCV RBC AUTO: 90.2 FL — SIGNIFICANT CHANGE UP (ref 80–100)
MONOCYTES # BLD AUTO: 0.31 K/UL — SIGNIFICANT CHANGE UP (ref 0–0.9)
MONOCYTES NFR BLD AUTO: 6.4 % — SIGNIFICANT CHANGE UP (ref 2–14)
NEUTROPHILS # BLD AUTO: 3.45 K/UL — SIGNIFICANT CHANGE UP (ref 1.8–7.4)
NEUTROPHILS NFR BLD AUTO: 71.3 % — SIGNIFICANT CHANGE UP (ref 43–77)
NRBC # BLD: 0 /100 WBCS — SIGNIFICANT CHANGE UP (ref 0–0)
PHOSPHATE SERPL-MCNC: 3.6 MG/DL — SIGNIFICANT CHANGE UP (ref 2.5–4.5)
PLATELET # BLD AUTO: 198 K/UL — SIGNIFICANT CHANGE UP (ref 150–400)
POTASSIUM SERPL-MCNC: 4.2 MMOL/L — SIGNIFICANT CHANGE UP (ref 3.5–5.3)
POTASSIUM SERPL-SCNC: 4.2 MMOL/L — SIGNIFICANT CHANGE UP (ref 3.5–5.3)
RBC # BLD: 3.17 M/UL — LOW (ref 4.2–5.8)
RBC # FLD: 13.2 % — SIGNIFICANT CHANGE UP (ref 10.3–14.5)
SODIUM SERPL-SCNC: 142 MMOL/L — SIGNIFICANT CHANGE UP (ref 135–145)
UUN UR-MCNC: 252 MG/DL — SIGNIFICANT CHANGE UP
WBC # BLD: 4.84 K/UL — SIGNIFICANT CHANGE UP (ref 3.8–10.5)
WBC # FLD AUTO: 4.84 K/UL — SIGNIFICANT CHANGE UP (ref 3.8–10.5)

## 2021-09-23 PROCEDURE — 99233 SBSQ HOSP IP/OBS HIGH 50: CPT

## 2021-09-23 PROCEDURE — 99231 SBSQ HOSP IP/OBS SF/LOW 25: CPT

## 2021-09-23 PROCEDURE — 76770 US EXAM ABDO BACK WALL COMP: CPT | Mod: 26

## 2021-09-23 RX ORDER — ONDANSETRON 8 MG/1
4 TABLET, FILM COATED ORAL THREE TIMES A DAY
Refills: 0 | Status: DISCONTINUED | OUTPATIENT
Start: 2021-09-23 | End: 2021-10-24

## 2021-09-23 RX ORDER — TAMSULOSIN HYDROCHLORIDE 0.4 MG/1
0.4 CAPSULE ORAL AT BEDTIME
Refills: 0 | Status: DISCONTINUED | OUTPATIENT
Start: 2021-09-23 | End: 2021-10-11

## 2021-09-23 RX ORDER — ACETAMINOPHEN 500 MG
1000 TABLET ORAL EVERY 8 HOURS
Refills: 0 | Status: COMPLETED | OUTPATIENT
Start: 2021-09-23 | End: 2021-09-24

## 2021-09-23 RX ORDER — FLUCONAZOLE 150 MG/1
200 TABLET ORAL ONCE
Refills: 0 | Status: COMPLETED | OUTPATIENT
Start: 2021-09-23 | End: 2021-09-23

## 2021-09-23 RX ORDER — ACETAMINOPHEN 500 MG
1000 TABLET ORAL EVERY 8 HOURS
Refills: 0 | Status: DISCONTINUED | OUTPATIENT
Start: 2021-09-23 | End: 2021-09-23

## 2021-09-23 RX ORDER — FLUCONAZOLE 150 MG/1
100 TABLET ORAL EVERY 24 HOURS
Refills: 0 | Status: DISCONTINUED | OUTPATIENT
Start: 2021-09-24 | End: 2021-10-08

## 2021-09-23 RX ORDER — FLUCONAZOLE 150 MG/1
TABLET ORAL
Refills: 0 | Status: DISCONTINUED | OUTPATIENT
Start: 2021-09-23 | End: 2021-10-08

## 2021-09-23 RX ADMIN — Medication 325 MILLIGRAM(S): at 11:37

## 2021-09-23 RX ADMIN — Medication 90 MILLIGRAM(S): at 05:10

## 2021-09-23 RX ADMIN — GABAPENTIN 100 MILLIGRAM(S): 400 CAPSULE ORAL at 05:10

## 2021-09-23 RX ADMIN — HYDROMORPHONE HYDROCHLORIDE 0.5 MILLIGRAM(S): 2 INJECTION INTRAMUSCULAR; INTRAVENOUS; SUBCUTANEOUS at 06:39

## 2021-09-23 RX ADMIN — AMPICILLIN SODIUM AND SULBACTAM SODIUM 200 GRAM(S): 250; 125 INJECTION, POWDER, FOR SUSPENSION INTRAMUSCULAR; INTRAVENOUS at 05:13

## 2021-09-23 RX ADMIN — ONDANSETRON 4 MILLIGRAM(S): 8 TABLET, FILM COATED ORAL at 18:27

## 2021-09-23 RX ADMIN — HEPARIN SODIUM 5000 UNIT(S): 5000 INJECTION INTRAVENOUS; SUBCUTANEOUS at 17:09

## 2021-09-23 RX ADMIN — PREGABALIN 1000 MICROGRAM(S): 225 CAPSULE ORAL at 11:37

## 2021-09-23 RX ADMIN — POLYETHYLENE GLYCOL 3350 17 GRAM(S): 17 POWDER, FOR SOLUTION ORAL at 11:36

## 2021-09-23 RX ADMIN — HEPARIN SODIUM 5000 UNIT(S): 5000 INJECTION INTRAVENOUS; SUBCUTANEOUS at 05:11

## 2021-09-23 RX ADMIN — GABAPENTIN 100 MILLIGRAM(S): 400 CAPSULE ORAL at 17:09

## 2021-09-23 RX ADMIN — HEPARIN SODIUM 5000 UNIT(S): 5000 INJECTION INTRAVENOUS; SUBCUTANEOUS at 22:11

## 2021-09-23 RX ADMIN — ATORVASTATIN CALCIUM 80 MILLIGRAM(S): 80 TABLET, FILM COATED ORAL at 22:10

## 2021-09-23 RX ADMIN — TAMSULOSIN HYDROCHLORIDE 0.4 MILLIGRAM(S): 0.4 CAPSULE ORAL at 22:11

## 2021-09-23 RX ADMIN — Medication 1000 MILLIGRAM(S): at 22:17

## 2021-09-23 RX ADMIN — Medication 100 MILLIGRAM(S): at 06:43

## 2021-09-23 RX ADMIN — FLUCONAZOLE 100 MILLIGRAM(S): 150 TABLET ORAL at 20:33

## 2021-09-23 RX ADMIN — AMPICILLIN SODIUM AND SULBACTAM SODIUM 200 GRAM(S): 250; 125 INJECTION, POWDER, FOR SUSPENSION INTRAMUSCULAR; INTRAVENOUS at 17:09

## 2021-09-23 RX ADMIN — OXYCODONE HYDROCHLORIDE 5 MILLIGRAM(S): 5 TABLET ORAL at 12:27

## 2021-09-23 RX ADMIN — SENNA PLUS 2 TABLET(S): 8.6 TABLET ORAL at 22:11

## 2021-09-23 RX ADMIN — Medication 1000 MILLIGRAM(S): at 23:00

## 2021-09-23 RX ADMIN — HYDROMORPHONE HYDROCHLORIDE 0.5 MILLIGRAM(S): 2 INJECTION INTRAMUSCULAR; INTRAVENOUS; SUBCUTANEOUS at 05:02

## 2021-09-23 RX ADMIN — OXYCODONE HYDROCHLORIDE 5 MILLIGRAM(S): 5 TABLET ORAL at 11:36

## 2021-09-23 NOTE — PROGRESS NOTE ADULT - SUBJECTIVE AND OBJECTIVE BOX
C A R [facility]  **********************************     DATE OF SERVICE: 09-23-21    Patient denies chest pain or shortness of breath.   Review of systems otherwise (-)  	  MEDICATIONS:  MEDICATIONS  (STANDING):  ampicillin/sulbactam  IVPB 3 Gram(s) IV Intermittent every 12 hours  atorvastatin 80 milliGRAM(s) Oral at bedtime  bisacodyl Suppository 10 milliGRAM(s) Rectal once  cyanocobalamin 1000 MICROGram(s) Oral daily  ergocalciferol 44500 Unit(s) Oral <User Schedule>  ferrous    sulfate 325 milliGRAM(s) Oral daily  gabapentin 100 milliGRAM(s) Oral every 12 hours  heparin   Injectable 5000 Unit(s) SubCutaneous every 8 hours  influenza   Vaccine 0.5 milliLiter(s) IntraMuscular once  insulin lispro (ADMELOG) corrective regimen sliding scale   SubCutaneous Before meals and at bedtime  levoFLOXacin IVPB 750 milliGRAM(s) IV Intermittent every 48 hours  metoprolol succinate  milliGRAM(s) Oral daily  NIFEdipine XL 90 milliGRAM(s) Oral daily  polyethylene glycol 3350 17 Gram(s) Oral daily  senna 2 Tablet(s) Oral at bedtime      LABS:	 	    CARDIAC MARKERS:                                9.3    4.84  )-----------( 198      ( 23 Sep 2021 06:28 )             28.6     Hemoglobin: 9.3 g/dL (09-23 @ 06:28)  Hemoglobin: 9.9 g/dL (09-22 @ 06:36)  Hemoglobin: 10.1 g/dL (09-21 @ 06:22)  Hemoglobin: 10.4 g/dL (09-20 @ 07:07)  Hemoglobin: 10.6 g/dL (09-19 @ 06:31)      09-23    142  |  109<H>  |  30<H>  ----------------------------<  98  4.2   |  19<L>  |  3.20<H>    Ca    8.6      23 Sep 2021 06:28  Phos  3.6     09-23      Creatinine Trend: 3.20<--, 2.52<--, 2.38<--, 1.42<--, 1.48<--, 1.85<--    COAGS:       proBNP:   Lipid Profile:   HgA1c:   TSH:       PHYSICAL EXAM:  T(C): 37.1 (09-23-21 @ 04:55), Max: 37.1 (09-22-21 @ 20:36)  HR: 81 (09-23-21 @ 04:55) (79 - 81)  BP: 113/56 (09-23-21 @ 04:55) (113/56 - 139/57)  RR: 18 (09-23-21 @ 04:55) (16 - 18)  SpO2: 96% (09-23-21 @ 04:55) (96% - 98%)  Wt(kg): --  I&O's Summary    22 Sep 2021 07:01  -  23 Sep 2021 07:00  --------------------------------------------------------  IN: 150 mL / OUT: 1 mL / NET: 149 mL          Gen: Appears well in NAD  HEENT:  (-)icterus (-)pallor  CV: N S1 S2 1/6 TRINIDAD (+)2 Pulses B/l  Resp:  Clear to ausculatation B/L, normal effort  GI: (+) BS Soft, NT, ND  Lymph:  (-)Edema, (-)obvious lymphadenopathy  Skin: Warm to touch, Normal turgor  Psych: Appropriate mood and affect      ASSESSMENT/PLAN: 	76y  Male PMH DM on insulin, HTN, HLD, normal lV fx moderate to severe AS 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    - Check EKG not in chart  - Would treat as high cardiac risk given potential severe AS.  However HE is not in clinical CHF and has tolerated a similar podiatric procedure on his last admission  - Abx per primary team  - will need SABIHA for eval of AS as an outpt with His cardiologist at [facility]     Zak Wilkins MD, [facility]  BEEPER [phone]

## 2021-09-23 NOTE — PROGRESS NOTE ADULT - ASSESSMENT
76 year old male with past medical history of poorly controlled IDDM, HTN, HLD, CAD s/p CABG and recent right partial 5th ray amputation (8/19)/21) who presented to ED with c/o right foot pain associated with discharge and foul odor. He tried taking Tylenol for the pain with minimal relief. Of note, the patient never followed up with podiatry after his procedure in August. MRI confirms suspected osteomyelitis - patient followed by podiatry and ID, patient undergo debridement and wound VAC placement. Patient currently on course of Unasyn and Levofloxacin, ID to follow up final surgical path. Patient with creatinine elevation of 3.2 this AM, and urinary retention on US bladder retaining 520cc urine. Molina placed with 550ml clear urine output. Nephrology Dr. Rodriguez consulted.

## 2021-09-23 NOTE — PROGRESS NOTE ADULT - SUBJECTIVE AND OBJECTIVE BOX
NP Note discussed with Primary Attending    Patient is a 76y old  Male who presents with a chief complaint of R Foot Pain (23 Sep 2021 13:46)      INTERVAL HPI/OVERNIGHT EVENTS: no new complaints    MEDICATIONS  (STANDING):  ampicillin/sulbactam  IVPB 3 Gram(s) IV Intermittent every 12 hours  atorvastatin 80 milliGRAM(s) Oral at bedtime  bisacodyl Suppository 10 milliGRAM(s) Rectal once  cyanocobalamin 1000 MICROGram(s) Oral daily  ergocalciferol 82674 Unit(s) Oral <User Schedule>  ferrous    sulfate 325 milliGRAM(s) Oral daily  gabapentin 100 milliGRAM(s) Oral every 12 hours  heparin   Injectable 5000 Unit(s) SubCutaneous every 8 hours  influenza   Vaccine 0.5 milliLiter(s) IntraMuscular once  insulin lispro (ADMELOG) corrective regimen sliding scale   SubCutaneous Before meals and at bedtime  levoFLOXacin IVPB 750 milliGRAM(s) IV Intermittent every 48 hours  metoprolol succinate  milliGRAM(s) Oral daily  NIFEdipine XL 90 milliGRAM(s) Oral daily  polyethylene glycol 3350 17 Gram(s) Oral daily  senna 2 Tablet(s) Oral at bedtime    MEDICATIONS  (PRN):  HYDROmorphone  Injectable 0.5 milliGRAM(s) IV Push daily PRN Severe Pain (7 - 10)  oxyCODONE    IR 5 milliGRAM(s) Oral every 4 hours PRN Moderate Pain (4 - 6)      __________________________________________________  REVIEW OF SYSTEMS:    CONSTITUTIONAL: No fever,   EYES: no acute visual disturbances  NECK: No pain or stiffness  RESPIRATORY: No cough; No shortness of breath  CARDIOVASCULAR: No chest pain, no palpitations  GASTROINTESTINAL: No pain. No nausea or vomiting; No diarrhea   NEUROLOGICAL: No headache or numbness, no tremors  MUSCULOSKELETAL: No joint pain, no muscle pain  GENITOURINARY: ibrahim catheter, urinary retention  PSYCHIATRY: no depression , no anxiety  ALL OTHER  ROS negative        Vital Signs Last 24 Hrs  T(C): 36.1 (23 Sep 2021 13:32), Max: 37.1 (22 Sep 2021 20:36)  T(F): 97 (23 Sep 2021 13:32), Max: 98.7 (22 Sep 2021 20:36)  HR: 79 (23 Sep 2021 13:32) (79 - 81)  BP: 129/58 (23 Sep 2021 13:32) (113/56 - 139/57)  BP(mean): --  RR: 18 (23 Sep 2021 13:32) (16 - 18)  SpO2: 95% (23 Sep 2021 13:32) (95% - 98%)    ________________________________________________  PHYSICAL EXAM:  GENERAL: NAD  HEENT: Normocephalic;  conjunctivae and sclerae clear; moist mucous membranes;   NECK : supple  CHEST/LUNG: Clear to auscultation bilaterally with good air entry   HEART: S1 S2  regular; no murmurs, gallops or rubs  ABDOMEN: Soft, Nontender, Nondistended; Bowel sounds present  EXTREMITIES: no cyanosis; no edema; no calf tenderness  SKIN: warm and dry; no rash  NERVOUS SYSTEM:  Awake and alert; Oriented  to place, person and time ; no new deficits    _________________________________________________  LABS:                        9.3    4.84  )-----------( 198      ( 23 Sep 2021 06:28 )             28.6     09-23    142  |  109<H>  |  30<H>  ----------------------------<  98  4.2   |  19<L>  |  3.20<H>    Ca    8.6      23 Sep 2021 06:28  Phos  3.6     09-23      PT/INR - ( 22 Sep 2021 06:36 )   PT: 14.9 sec;   INR: 1.27 ratio         PTT - ( 22 Sep 2021 06:36 )  PTT:33.5 sec  Urinalysis Basic - ( 22 Sep 2021 16:14 )    Color: Yellow / Appearance: Slightly Turbid / S.020 / pH: x  Gluc: x / Ketone: Trace  / Bili: Negative / Urobili: Negative   Blood: x / Protein: 30 mg/dL / Nitrite: Negative   Leuk Esterase: Small / RBC: 0-2 /HPF / WBC 3-5 /HPF   Sq Epi: x / Non Sq Epi: Moderate /HPF / Bacteria: Moderate /HPF      CAPILLARY BLOOD GLUCOSE      POCT Blood Glucose.: 127 mg/dL (23 Sep 2021 11:38)  POCT Blood Glucose.: 102 mg/dL (23 Sep 2021 08:22)  POCT Blood Glucose.: 100 mg/dL (22 Sep 2021 20:53)  POCT Blood Glucose.: 134 mg/dL (22 Sep 2021 16:43)        RADIOLOGY & ADDITIONAL TESTS:    EXAM:  US KIDNEYS AND BLADDER                            PROCEDURE DATE:  2021          INTERPRETATION:  CLINICAL INFORMATION: Acute kidney injury. Urinary retention.    COMPARISON: 2021    TECHNIQUE: Sonography of the kidneys and bladder.    FINDINGS:    Right kidney: 11.7 cm. No renal mass, hydronephrosis or calculi. The right kidney is hyperechoic.    Left kidney: 10.9 cm. No renal mass, hydronephrosis or calculi. The left kidney is hyperechoic.    Urinary bladder: The bladder hasa volume of 520 cc which is largely distended. The bladder is otherwise grossly unremarkable. The patient has no urge to void.    IMPRESSION:    No hydronephrosis.    Both kidneys are hyperechoic for which clinical correlation with intrinsic medical renal disease is recommended.    Largely distended urinary bladder.    --- End of Report ---    EXAM:  MR FOOT RT                            PROCEDURE DATE:  2021          INTERPRETATION:  Clinical Information: Recent fifth metatarsal head resection now with fifth digit pain, swelling and foul discharge.    Comparison: Radiographs of the right foot from 2021 and MRI the right foot from 2021.    Technique:  MRI of the right midfoot and forefoot.  Intravenous Contrast: None.    Findings:    There is a resection at the mid aspect of the fifth metatarsal shaft. There is a lateral soft tissue wound beginning at the level of the amputation which extends distally. There is susceptibility artifact in the region of the amputation consistent with postoperative change. There is hyperintense T2 marrow signal throughout the remaining fifth metatarsal and within the adjacent fourth metatarsal head which is nonspecific and could be related to recent postoperative changes although osteomyelitis is suspected.    There is edema and mild atrophy within the plantar muscles of the foot. There is minimal spurring at the first metatarsophalangeal and hallux sesamoid articulations.    Impression:  Resection at the mid aspect of the fifth metatarsal. Lateral soft tissue wound with marrow signal abnormality throughout the fifth metatarsal and within the adjacent fourth metatarsal head which is nonspecific in the setting of recent surgery although osteomyelitis is suspected.    --- End of Report ---        Imaging  Reviewed:  YES/NO    Consultant(s) Notes Reviewed:   YES/ No      Plan of care was discussed with patient and /or primary care giver; all questions and concerns were addressed

## 2021-09-23 NOTE — DIETITIAN INITIAL EVALUATION ADULT. - PERTINENT LABORATORY DATA
09-23 Na142 mmol/L Glu 98 mg/dL K+ 4.2 mmol/L Cr  3.20 mg/dL<H> BUN 30 mg/dL<H>   09-23 Phos 3.6 mg/dL   09-20 Alb 2.6 g/dL<L>

## 2021-09-23 NOTE — DIETITIAN INITIAL EVALUATION ADULT. - PROBLEM SELECTOR PLAN 1
p/w right foot pain, tender to touch, As per podiatry (+)probe to bone  MRI Form in chart  F/U MRI R foot w/ contrast, r/o osteo  discharged on augmentin on prior admission in august  tylenol prn, percocet prn for pain control  s/p vanc + zosyn in ED  C/w vanc q24 + zosyn  obtain vt prior to 3rd dose  f/u blood culture, wound culture  ESR elevated, may be chronic osteo  Podiatry following  ID consult Dr Barrett

## 2021-09-23 NOTE — PROGRESS NOTE ADULT - ASSESSMENT
Calculator page.   This informational tool is a resource and is not meant for direct clinical application.   Patient Search   Multi-Patient Search   MME Calculator   Reports   Drug List   Designation   My CORY #  Data Detail Level: Printer-Friendly View Extended View  Confidential Drug Utilization Report  Search Terms: alfred villagran, 1945Search Date: 09/18/2021 13:46:54 PM  The Drug Utilization Report below displays all of the controlled substance prescriptions, if any, that your patient has filled in the last twelve months. The information displayed on this report is compiled from pharmacy submissions to the Department, and accurately reflects the information as submitted by the pharmacies.    This report was requested by: Minh Slater | Reference #: [id number]    There are no results for the search terms that you entered.

## 2021-09-23 NOTE — PROGRESS NOTE ADULT - SUBJECTIVE AND OBJECTIVE BOX
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 Pt with sharp pain on the R lateral foot. As per daughter, pt was taking tylenol which did not help his pain prompting the patient to ask his daughter to take him to the hospital. Pt is a poor historian. Daughter states that the patient did not see his podiatrist after the surgery. No fever, chest, pain, palpitations, nausea, vomiting, diarrhea (17 Sep 2021 01:11)    Pt is s/p 5th toe wound debridement and wound vac application on , POD #1. Being treated for OM. Pt with worsening OREN Cr 3.2. Pain consulted for foot pain. Pt seen and examined at bedside. Pt laying in bed reports right foot pain score 10/10 and not tolerable. Pt is due for PRN oxycodone, informed RN to administer. Pt describes pain as throbbing, radiating throughout right foot, alleviated by pain medications, exacerbated by movement. Pt tolerating PO diet. Pt denies lethargy, nausea, vomiting, constipation and itchiness. Reports last BM . Patient stated goal for pain control: to be able to take deep breaths, get out of bed to chair with tolerable pain control.     PAST MEDICAL & SURGICAL HISTORY:  HTN (hypertension)    HLD (hyperlipidemia)    DM (diabetes mellitus)    CAD (coronary artery disease)    Glaucoma, angle-closure    S/P CABG (coronary artery bypass graft)    History of thyroid surgery        FAMILY HISTORY:  Family history of acute myocardial infarction (Father)    Family history of diabetes mellitus (Mother, Sibling)    Family history of hypertension (Mother, Sibling)    Family history of hyperlipidemia (Mother, Sibling)        Social History:  Lives at home  Denies alcohol intake, smoking, and illicit drug use (17 Sep 2021 01:11)    Allergies    No Known Allergies    MEDICATIONS  (STANDING):  ampicillin/sulbactam  IVPB 3 Gram(s) IV Intermittent every 12 hours  atorvastatin 80 milliGRAM(s) Oral at bedtime  bisacodyl Suppository 10 milliGRAM(s) Rectal once  cyanocobalamin 1000 MICROGram(s) Oral daily  ergocalciferol 23552 Unit(s) Oral <User Schedule>  ferrous    sulfate 325 milliGRAM(s) Oral daily  gabapentin 100 milliGRAM(s) Oral every 12 hours  heparin   Injectable 5000 Unit(s) SubCutaneous every 8 hours  influenza   Vaccine 0.5 milliLiter(s) IntraMuscular once  insulin lispro (ADMELOG) corrective regimen sliding scale   SubCutaneous Before meals and at bedtime  levoFLOXacin IVPB 750 milliGRAM(s) IV Intermittent every 48 hours  metoprolol succinate  milliGRAM(s) Oral daily  NIFEdipine XL 90 milliGRAM(s) Oral daily  polyethylene glycol 3350 17 Gram(s) Oral daily  senna 2 Tablet(s) Oral at bedtime    MEDICATIONS  (PRN):  HYDROmorphone  Injectable 0.5 milliGRAM(s) IV Push daily PRN Severe Pain (7 - 10)  oxyCODONE    IR 5 milliGRAM(s) Oral every 4 hours PRN Moderate Pain (4 - 6)      Vital Signs Last 24 Hrs  T(C): 36.1 (23 Sep 2021 13:32), Max: 37.1 (22 Sep 2021 20:36)  T(F): 97 (23 Sep 2021 13:32), Max: 98.7 (22 Sep 2021 20:36)  HR: 79 (23 Sep 2021 13:32) (79 - 81)  BP: 129/58 (23 Sep 2021 13:32) (113/56 - 139/57)  BP(mean): --  RR: 18 (23 Sep 2021 13:32) (16 - 18)  SpO2: 95% (23 Sep 2021 13:32) (95% - 98%)  COVID-19 PCR: NotDetec (21 Sep 2021 10:54)  COVID-19 PCR: NotDetec (19 Sep 2021 15:05)  COVID-19 PCR: NotDetec (16 Sep 2021 22:24)  COVID-19 PCR: NotDetec (18 Aug 2021 18:23)  COVID-19 PCR: NotDetec (14 Aug 2021 09:41)    LABS: Reviewed                          9.3    4.84  )-----------( 198      ( 23 Sep 2021 06:28 )             28.6     09-23    142  |  109<H>  |  30<H>  ----------------------------<  98  4.2   |  19<L>  |  3.20<H>    Ca    8.6      23 Sep 2021 06:28  Phos  3.6     -      PT/INR - ( 22 Sep 2021 06:36 )   PT: 14.9 sec;   INR: 1.27 ratio         PTT - ( 22 Sep 2021 06:36 )  PTT:33.5 sec    Urinalysis Basic - ( 22 Sep 2021 16:14 )    Color: Yellow / Appearance: Slightly Turbid / S.020 / pH: x  Gluc: x / Ketone: Trace  / Bili: Negative / Urobili: Negative   Blood: x / Protein: 30 mg/dL / Nitrite: Negative   Leuk Esterase: Small / RBC: 0-2 /HPF / WBC 3-5 /HPF   Sq Epi: x / Non Sq Epi: Moderate /HPF / Bacteria: Moderate /HPF      CAPILLARY BLOOD GLUCOSE      POCT Blood Glucose.: 127 mg/dL (23 Sep 2021 11:38)  POCT Blood Glucose.: 102 mg/dL (23 Sep 2021 08:22)  POCT Blood Glucose.: 100 mg/dL (22 Sep 2021 20:53)  POCT Blood Glucose.: 134 mg/dL (22 Sep 2021 16:43)    COVID-19 PCR: NotDetec (21 Sep 2021 10:54)  COVID-19 PCR: NotDetec (19 Sep 2021 15:05)  COVID-19 PCR: NotDetec (16 Sep 2021 22:24)  COVID-19 PCR: NotDetec (18 Aug 2021 18:23)  COVID-19 PCR: NotDetec (14 Aug 2021 09:41)      Radiology: Reviewed  < from: MR Foot No Cont, Right (21 @ 13:04) >    EXAM:  MR FOOT RT                            PROCEDURE DATE:  2021          INTERPRETATION:  Clinical Information: Recent fifth metatarsal head resection now with fifth digit pain, swelling and foul discharge.    Comparison: Radiographs of the right foot from 2021 and MRI the right foot from 2021.    Technique:  MRI of the right midfoot and forefoot.  Intravenous Contrast: None.    Findings:    There is a resection at the mid aspect of the fifth metatarsal shaft. There is a lateral soft tissue wound beginning at the level of the amputation which extends distally. There is susceptibility artifact in the region of the amputation consistent with postoperative change. There is hyperintense T2 marrow signal throughout the remaining fifth metatarsal and within the adjacent fourth metatarsal head which is nonspecific and could be related to recent postoperative changes although osteomyelitis is suspected.    There is edema and mild atrophy within the plantar muscles of the foot. There is minimal spurring at the first metatarsophalangeal and hallux sesamoid articulations.    Impression:  Resection at the mid aspect of the fifth metatarsal. Lateral soft tissue wound with marrow signal abnormality throughout the fifth metatarsal and within the adjacent fourth metatarsal head which is nonspecific in the setting of recent surgery although osteomyelitis is suspected.    --- End of Report ---            AMIE ORTIZ MD; Attending Radiologist  This document has been electronically signed. Sep 17 2021  1:49PM    < end of copied text >    < from: US Kidney and Bladder (21 @ 10:08) >    EXAM:  US KIDNEYS AND BLADDER                            PROCEDURE DATE:  2021          INTERPRETATION:  CLINICAL INFORMATION: Acute kidney injury. Urinary retention.    COMPARISON: 2021    TECHNIQUE: Sonography of the kidneys and bladder.    FINDINGS:    Right kidney: 11.7 cm. No renal mass, hydronephrosis or calculi. The right kidney is hyperechoic.    Left kidney: 10.9 cm. No renal mass, hydronephrosis or calculi. The left kidney is hyperechoic.    Urinary bladder: The bladder hasa volume of 520 cc which is largely distended. The bladder is otherwise grossly unremarkable. The patient has no urge to void.    IMPRESSION:    No hydronephrosis.    Both kidneys are hyperechoic for which clinical correlation with intrinsic medical renal disease is recommended.    Largely distended urinary bladder.    --- End of Report ---            RENETTA PERKINS MD; Attending Radiologist  This document has been electronically signed. Sep 23 2021 10:40AM    < end of copied text >      ORT Score -   Family Hx of substance abuse	Female	      Male  Alcohol 	                                           1                     3  Illegal drugs	                                   2                     3  Rx drugs                                           4 	                  4  Personal Hx of substance abuse		  Alcohol 	                                          3	                  3  Illegal drugs                                     4	                  4  Rx drugs                                            5 	                  5  Age between 16- 45 years	           1                     1  hx preadolescent sexual abuse	   3 	                  0  Psychological disease		  ADD, OCD, bipolar, schizophrenia   2	          2  Depression                                           1 	          1  Total: 0    a score of 3 or lower indicates low risk for opioid abuse		  a score of 4-7 indicates moderate risk for opioid abuse		  a score of 8 or higher indicates high risk for opioid abuse    REVIEW OF SYSTEMS:  CONSTITUTIONAL: No fever + fatigue  HEENT:  + [place], no change in vision  NECK: No pain or stiffness  RESPIRATORY: No cough, wheezing, chills or hemoptysis; No shortness of breath  CARDIOVASCULAR: No chest pain, palpitations, dizziness, or leg swelling  GASTROINTESTINAL: No loss of appetite, decreased PO intake. No abdominal or epigastric pain. No nausea, vomiting; No diarrhea or constipation.   GENITOURINARY: + urinary retention.   MUSCULOSKELETAL: + right foot pain, no swelling; + generalized weakness; no falls   NEURO: No headaches, No numbness/tingling b/l LE  PSYCHIATRIC: No depression, anxiety or difficulty sleeping    PHYSICAL EXAM:  GENERAL:  Alert & Oriented X4, lethargic, cooperative, NAD, Speech is clear.   RESPIRATORY: Respirations even and unlabored. Clear to auscultation bilaterally; No rales, rhonchi, wheezing, or rubs  CARDIOVASCULAR: Normal S1/S2, regular rate and rhythm; No murmurs, rubs, or gallops. No JVD.   GASTROINTESTINAL:  Soft, Nontender, + distended; Bowel sounds present  GENITOURINARY: Urinary catheter draining clear yellow- mario urine.   PERIPHERAL VASCULAR: + right foot wound dressing c/d/i with wound vac; Extremities warm without edema. 2+ radial Pulses, and 1+left pedal pulse, No cyanosis, No calf tenderness  MUSCULOSKELETAL: Motor Strength 4/5 B/L upper and 3/5 left lower extremities; + right LE + toe wiggle, right foot elevated on pillow; moves all extremities equally against gravity; ROM decreased b/l LE; + right  to palpation.   SKIN: + right foot wound     Risk factors associated with adverse outcomes related to opioid treatment  [ ]  Concurrent benzodiazepine use  [ ]  History/ Active substance use or alcohol use disorder  [ ] Psychiatric co-morbidity  [ ] Sleep apnea  [ ] COPD  [ ] BMI> 35  [ ] Liver dysfunction  [X ] Renal dysfunction  [ ] CHF  [ ] Smoker  [X ]  Age > 60 years    [X ]  [place]  Reviewed and Copied to Chart. See below.    Plan of care and goal oriented pain management treatment options were discussed with patient and /or primary care giver; all questions and concerns were addressed and care was aligned with patient's wishes.    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 Pt with sharp pain on the R lateral foot. As per daughter, pt was taking tylenol which did not help his pain prompting the patient to ask his daughter to take him to the hospital. Pt is a poor historian. Daughter states that the patient did not see his podiatrist after the surgery. No fever, chest, pain, palpitations, nausea, vomiting, diarrhea (17 Sep 2021 01:11)    Pt is s/p 5th toe wound debridement and wound vac application on , POD #1. Being treated for OM. Pt with worsening OREN Cr 3.2. Pain consulted for foot pain. Pt seen and examined at bedside. Pt laying in bed reports right foot pain score 10/10 and not tolerable. Pt is due for PRN oxycodone, informed RN to administer. Pt describes pain as throbbing, radiating throughout right foot, alleviated by pain medications, exacerbated by movement. Pt tolerating PO diet. Pt denies lethargy, nausea, vomiting, constipation and itchiness. Reports last BM . Patient stated goal for pain control: to be able to take deep breaths, get out of bed to chair with tolerable pain control.     PAST MEDICAL & SURGICAL HISTORY:  HTN (hypertension)    HLD (hyperlipidemia)    DM (diabetes mellitus)    CAD (coronary artery disease)    Glaucoma, angle-closure    S/P CABG (coronary artery bypass graft)    History of thyroid surgery        FAMILY HISTORY:  Family history of acute myocardial infarction (Father)    Family history of diabetes mellitus (Mother, Sibling)    Family history of hypertension (Mother, Sibling)    Family history of hyperlipidemia (Mother, Sibling)        Social History:  Lives at home  Denies alcohol intake, smoking, and illicit drug use (17 Sep 2021 01:11)    Allergies    No Known Allergies    MEDICATIONS  (STANDING):  ampicillin/sulbactam  IVPB 3 Gram(s) IV Intermittent every 12 hours  atorvastatin 80 milliGRAM(s) Oral at bedtime  bisacodyl Suppository 10 milliGRAM(s) Rectal once  cyanocobalamin 1000 MICROGram(s) Oral daily  ergocalciferol 52099 Unit(s) Oral <User Schedule>  ferrous    sulfate 325 milliGRAM(s) Oral daily  gabapentin 100 milliGRAM(s) Oral every 12 hours  heparin   Injectable 5000 Unit(s) SubCutaneous every 8 hours  influenza   Vaccine 0.5 milliLiter(s) IntraMuscular once  insulin lispro (ADMELOG) corrective regimen sliding scale   SubCutaneous Before meals and at bedtime  levoFLOXacin IVPB 750 milliGRAM(s) IV Intermittent every 48 hours  metoprolol succinate  milliGRAM(s) Oral daily  NIFEdipine XL 90 milliGRAM(s) Oral daily  polyethylene glycol 3350 17 Gram(s) Oral daily  senna 2 Tablet(s) Oral at bedtime    MEDICATIONS  (PRN):  HYDROmorphone  Injectable 0.5 milliGRAM(s) IV Push daily PRN Severe Pain (7 - 10)  oxyCODONE    IR 5 milliGRAM(s) Oral every 4 hours PRN Moderate Pain (4 - 6)      Vital Signs Last 24 Hrs  T(C): 36.1 (23 Sep 2021 13:32), Max: 37.1 (22 Sep 2021 20:36)  T(F): 97 (23 Sep 2021 13:32), Max: 98.7 (22 Sep 2021 20:36)  HR: 79 (23 Sep 2021 13:32) (79 - 81)  BP: 129/58 (23 Sep 2021 13:32) (113/56 - 139/57)  BP(mean): --  RR: 18 (23 Sep 2021 13:32) (16 - 18)  SpO2: 95% (23 Sep 2021 13:32) (95% - 98%)  COVID-19 PCR: NotDetec (21 Sep 2021 10:54)  COVID-19 PCR: NotDetec (19 Sep 2021 15:05)  COVID-19 PCR: NotDetec (16 Sep 2021 22:24)  COVID-19 PCR: NotDetec (18 Aug 2021 18:23)  COVID-19 PCR: NotDetec (14 Aug 2021 09:41)    LABS: Reviewed                          9.3    4.84  )-----------( 198      ( 23 Sep 2021 06:28 )             28.6     09-23    142  |  109<H>  |  30<H>  ----------------------------<  98  4.2   |  19<L>  |  3.20<H>    Ca    8.6      23 Sep 2021 06:28  Phos  3.6     -      PT/INR - ( 22 Sep 2021 06:36 )   PT: 14.9 sec;   INR: 1.27 ratio         PTT - ( 22 Sep 2021 06:36 )  PTT:33.5 sec    Urinalysis Basic - ( 22 Sep 2021 16:14 )    Color: Yellow / Appearance: Slightly Turbid / S.020 / pH: x  Gluc: x / Ketone: Trace  / Bili: Negative / Urobili: Negative   Blood: x / Protein: 30 mg/dL / Nitrite: Negative   Leuk Esterase: Small / RBC: 0-2 /HPF / WBC 3-5 /HPF   Sq Epi: x / Non Sq Epi: Moderate /HPF / Bacteria: Moderate /HPF      CAPILLARY BLOOD GLUCOSE      POCT Blood Glucose.: 127 mg/dL (23 Sep 2021 11:38)  POCT Blood Glucose.: 102 mg/dL (23 Sep 2021 08:22)  POCT Blood Glucose.: 100 mg/dL (22 Sep 2021 20:53)  POCT Blood Glucose.: 134 mg/dL (22 Sep 2021 16:43)    COVID-19 PCR: NotDetec (21 Sep 2021 10:54)  COVID-19 PCR: NotDetec (19 Sep 2021 15:05)  COVID-19 PCR: NotDetec (16 Sep 2021 22:24)  COVID-19 PCR: NotDetec (18 Aug 2021 18:23)  COVID-19 PCR: NotDetec (14 Aug 2021 09:41)      Radiology: Reviewed  < from: MR Foot No Cont, Right (21 @ 13:04) >    EXAM:  MR FOOT RT                            PROCEDURE DATE:  2021          INTERPRETATION:  Clinical Information: Recent fifth metatarsal head resection now with fifth digit pain, swelling and foul discharge.    Comparison: Radiographs of the right foot from 2021 and MRI the right foot from 2021.    Technique:  MRI of the right midfoot and forefoot.  Intravenous Contrast: None.    Findings:    There is a resection at the mid aspect of the fifth metatarsal shaft. There is a lateral soft tissue wound beginning at the level of the amputation which extends distally. There is susceptibility artifact in the region of the amputation consistent with postoperative change. There is hyperintense T2 marrow signal throughout the remaining fifth metatarsal and within the adjacent fourth metatarsal head which is nonspecific and could be related to recent postoperative changes although osteomyelitis is suspected.    There is edema and mild atrophy within the plantar muscles of the foot. There is minimal spurring at the first metatarsophalangeal and hallux sesamoid articulations.    Impression:  Resection at the mid aspect of the fifth metatarsal. Lateral soft tissue wound with marrow signal abnormality throughout the fifth metatarsal and within the adjacent fourth metatarsal head which is nonspecific in the setting of recent surgery although osteomyelitis is suspected.    --- End of Report ---            AMIE ORTIZ MD; Attending Radiologist  This document has been electronically signed. Sep 17 2021  1:49PM    < end of copied text >    < from: US Kidney and Bladder (21 @ 10:08) >    EXAM:  US KIDNEYS AND BLADDER                            PROCEDURE DATE:  2021          INTERPRETATION:  CLINICAL INFORMATION: Acute kidney injury. Urinary retention.    COMPARISON: 2021    TECHNIQUE: Sonography of the kidneys and bladder.    FINDINGS:    Right kidney: 11.7 cm. No renal mass, hydronephrosis or calculi. The right kidney is hyperechoic.    Left kidney: 10.9 cm. No renal mass, hydronephrosis or calculi. The left kidney is hyperechoic.    Urinary bladder: The bladder hasa volume of 520 cc which is largely distended. The bladder is otherwise grossly unremarkable. The patient has no urge to void.    IMPRESSION:    No hydronephrosis.    Both kidneys are hyperechoic for which clinical correlation with intrinsic medical renal disease is recommended.    Largely distended urinary bladder.    --- End of Report ---            RENETTA PERKINS MD; Attending Radiologist  This document has been electronically signed. Sep 23 2021 10:40AM    < end of copied text >      ORT Score -   Family Hx of substance abuse	Female	      Male  Alcohol 	                                           1                     3  Illegal drugs	                                   2                     3  Rx drugs                                           4 	                  4  Personal Hx of substance abuse		  Alcohol 	                                          3	                  3  Illegal drugs                                     4	                  4  Rx drugs                                            5 	                  5  Age between 16- 45 years	           1                     1  hx preadolescent sexual abuse	   3 	                  0  Psychological disease		  ADD, OCD, bipolar, schizophrenia   2	          2  Depression                                           1 	          1  Total: 0    a score of 3 or lower indicates low risk for opioid abuse		  a score of 4-7 indicates moderate risk for opioid abuse		  a score of 8 or higher indicates high risk for opioid abuse    REVIEW OF SYSTEMS:  CONSTITUTIONAL: No fever + fatigue  HEENT:  + [place], no change in vision  NECK: No pain or stiffness  RESPIRATORY: No cough, wheezing, chills or hemoptysis; No shortness of breath  CARDIOVASCULAR: No chest pain, palpitations, dizziness, or leg swelling  GASTROINTESTINAL: No loss of appetite, decreased PO intake. No abdominal or epigastric pain. No nausea, vomiting; No diarrhea or constipation.   GENITOURINARY: + urinary retention.   MUSCULOSKELETAL: + right foot pain, no swelling; + generalized weakness; no falls   NEURO: No headaches, No numbness/tingling b/l LE  PSYCHIATRIC: No depression, anxiety or difficulty sleeping    PHYSICAL EXAM:  GENERAL:  Alert & Oriented X4, lethargic, cooperative, NAD, Speech is clear.   RESPIRATORY: Respirations even and unlabored. Clear to auscultation bilaterally; No rales, rhonchi, wheezing, or rubs  CARDIOVASCULAR: Normal S1/S2, regular rate and rhythm; + systolic murmur, No rubs, or gallops. No JVD.   GASTROINTESTINAL:  Soft, Nontender, + distended; Bowel sounds present  GENITOURINARY: Urinary catheter draining clear yellow- mario urine.   PERIPHERAL VASCULAR: + right foot wound dressing c/d/i with wound vac; Extremities warm without edema. 2+ radial Pulses, and 1+left pedal pulse, No cyanosis, No calf tenderness  MUSCULOSKELETAL: Motor Strength 4/5 B/L upper and 3/5 left lower extremities; + right LE + toe wiggle, right foot elevated on pillow; moves all extremities equally against gravity; ROM decreased b/l LE; + right  to palpation.   SKIN: + right foot wound     Risk factors associated with adverse outcomes related to opioid treatment  [ ]  Concurrent benzodiazepine use  [ ]  History/ Active substance use or alcohol use disorder  [ ] Psychiatric co-morbidity  [ ] Sleep apnea  [ ] COPD  [ ] BMI> 35  [ ] Liver dysfunction  [X ] Renal dysfunction  [ ] CHF  [ ] Smoker  [X ]  Age > 60 years    [X ]  [place]  Reviewed and Copied to Chart. See below.    Plan of care and goal oriented pain management treatment options were discussed with patient and /or primary care giver; all questions and concerns were addressed and care was aligned with patient's wishes.    Educated patient on goal oriented pain management treatment options     21 @ 15:49

## 2021-09-23 NOTE — PROGRESS NOTE ADULT - PROBLEM SELECTOR PLAN 3
poorly controlled  A1C 11.4  Lantus QHS, ISS as per protocol  accuchecks AC, HS OREN superimposed on Stage III CKD  concern for AIN 2/2 antibiotics   Creatinine 3.2 this AM with bicarb 18, trending up  f/u urine lytes to calculate FENa, c/w IVF for 12 hours   avoid nephrotoxic agents, dose as per GFR  monitor creatinine, electrolytes  holding Lisinopril, Nifedipine incr. 90 mg daily   f/u C3/ C4; ASO negative  Urine eosinophils negative  renal ultrasound   bladder scan to r/o retention  Nephrology Dr. Rodriguez consulted

## 2021-09-23 NOTE — DIETITIAN INITIAL EVALUATION ADULT. - OTHER INFO
Pt  Visited. Observed Pt eating B fats.  PO intake  Poor.  Pt states he does not have appetite. Offered Glucerna shakes agreed to try. .  Pt is a poor Historian. Pt seen for LOS. Pt with Acute Osteomyelitis  R foot. S/P Debridement . Labs noted.  Po tolerated.  Bed scale 198 with wound vacc.

## 2021-09-23 NOTE — PROGRESS NOTE ADULT - SUBJECTIVE AND OBJECTIVE BOX
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 s/p OR debridement today. Nephrology consulted for abrupt rise in SCr.     Hospital Medications: Medications reviewed.  REVIEW OF SYSTEMS:  CONSTITUTIONAL: No fevers or chills  RESPIRATORY: No shortness of breath  CARDIOVASCULAR: No chest pain.  GASTROINTESTINAL: No nausea, vomiting, diarrhea or abdominal pain.   : +difficulty urinating ; last time he urinated he says was prior to OR  yesterday  VASCULAR: No bilateral lower extremity edema. Rt foot with mild pain    VITALS:  T(F): 97 (21 @ 13:32), Max: 98.7 (21 @ 20:36)  HR: 79 (21 @ 13:32)  BP: 129/58 (21 @ 13:32)  RR: 18 (21 @ 13:32)  SpO2: 95% (21 @ 13:32)  Wt(kg): --  Height (cm): 170.2 ( @ 21:58)  Weight (kg): 80.7 ( @ 21:58)  BMI (kg/m2): 27.9 ( @ :58)  BSA (m2): 1.92 ( @ 21:58)     @ 07:01  -   @ 07:00  --------------------------------------------------------  IN: 150 mL / OUT: 1 mL / NET: 149 mL      PHYSICAL EXAM:  Gen: NAD, calm  HEENT: MMM  Neck: no JVD  Cards: RRR, +S1/S2, +TRINIDAD  Resp: CTA B/L  GI: soft, NT/ND, NABS  Extremities: no LE edema B/L  Derm: Rt foot wrapped/ wound vac    LABS:      142  |  109<H>  |  30<H>  ----------------------------<  98  4.2   |  19<L>  |  3.20<H>    Ca    8.6      23 Sep 2021 06:28  Phos  3.6           Creatinine Trend: 3.20 <--, 2.52 <--, 2.38 <--, 1.42 <--, 1.48 <--, 1.85 <--, 1.48 <--, 1.44 <--, 1.70 <--                        9.3    4.84  )-----------( 198      ( 23 Sep 2021 06:28 )             28.6     Urine Studies:  Urinalysis Basic - ( 22 Sep 2021 16:14 )    Color: Yellow / Appearance: Slightly Turbid / S.020 / pH:   Gluc:  / Ketone: Trace  / Bili: Negative / Urobili: Negative   Blood:  / Protein: 30 mg/dL / Nitrite: Negative   Leuk Esterase: Small / RBC: 0-2 /HPF / WBC 3-5 /HPF   Sq Epi:  / Non Sq Epi: Moderate /HPF / Bacteria: Moderate /HPF      Creatinine, Random Urine: 131 mg/dL ( @ 16:14)  Chloride, Random Urine: 52 mmol/L ( @ 16:14)  Sodium, Random Urine: 53 mmol/L ( @ 16:14)  Sodium, Random Urine: 93 mmol/L ( @ 17:19)  Creatinine, Random Urine: 65 mg/dL ( @ 17:19)    RADIOLOGY & ADDITIONAL STUDIES:    < from: US Kidney and Bladder (21 @ 10:08) >    EXAM:  US KIDNEYS AND BLADDER                            PROCEDURE DATE:  2021        < end of copied text >    < from: US Kidney and Bladder (21 @ 10:08) >    FINDINGS:    Right kidney: 11.7 cm. No renal mass, hydronephrosis or calculi. The right kidney is hyperechoic.    Left kidney: 10.9 cm. No renal mass, hydronephrosis or calculi. The left kidney is hyperechoic.    Urinary bladder: The bladder hasa volume of 520 cc which is largely distended. The bladder is otherwise grossly unremarkable. The patient has no urge to void.    IMPRESSION:    No hydronephrosis.    Both kidneys are hyperechoic for which clinical correlation with intrinsic medical renal disease is recommended.    Largely distended urinary bladder.    --- End of Report ---      < end of copied text >

## 2021-09-23 NOTE — PROGRESS NOTE ADULT - SUBJECTIVE AND OBJECTIVE BOX
Patient is seen and examined at the bed side, is afebrile.  The creatinine is trending back up..        REVIEW OF SYSTEMS: All other review systems are negative      ALLERGIES: No Known Allergies      Vital Signs Last 24 Hrs  T(C): 36.1 (23 Sep 2021 13:32), Max: 37.1 (22 Sep 2021 20:36)  T(F): 97 (23 Sep 2021 13:32), Max: 98.7 (22 Sep 2021 20:36)  HR: 79 (23 Sep 2021 13:32) (79 - 81)  BP: 129/58 (23 Sep 2021 13:32) (113/56 - 137/59)  BP(mean): --  RR: 18 (23 Sep 2021 13:32) (16 - 18)  SpO2: 95% (23 Sep 2021 13:32) (95% - 98%)      PHYSICAL EXAM:  GENERAL: Not in distress   CHEST/LUNG:  Not using accessory muscles  HEART: s1 and s2 present  ABDOMEN:  Nontender and  Nondistended  EXTREMITIES: Right foot bandage in placed   CNS: Awake and Alert      LABS:                        9.3    4.84  )-----------( 198      ( 23 Sep 2021 06:28 )             28.6                           9.9    5.46  )-----------( 219      ( 22 Sep 2021 06:36 )             30.9              09-23    142  |  109<H>  |  30<H>  ----------------------------<  98  4.2   |  19<L>  |  3.20<H>    Ca    8.6      23 Sep 2021 06:28  Phos  3.6     09-23 09-21    141  |  109<H>  |  15  ----------------------------<  69<L>  3.9   |  20<L>  |  1.42<H>    Ca    8.8      21 Sep 2021 06:22    TPro  7.1  /  Alb  2.6<L>  /  TBili  0.5  /  DBili  x   /  AST  11  /  ALT  12  /  AlkPhos  103  09-20 09-19    137  |  107  |  17  ----------------------------<  82  4.1   |  22  |  1.85<H>    Ca    9.1      19 Sep 2021 06:28    TPro  7.2  /  Alb  2.7<L>  /  TBili  0.6  /  DBili  x   /  AST  10  /  ALT  13  /  AlkPhos  97  09-19        CAPILLARY BLOOD GLUCOSE  POCT Blood Glucose.: 134 mg/dL (17 Sep 2021 17:11)  POCT Blood Glucose.: 128 mg/dL (17 Sep 2021 11:06)  POCT Blood Glucose.: 157 mg/dL (17 Sep 2021 04:10)      MEDICATIONS  (STANDING):    acetaminophen   Tablet .. 1000 milliGRAM(s) Oral every 8 hours  ampicillin/sulbactam  IVPB 3 Gram(s) IV Intermittent every 12 hours  atorvastatin 80 milliGRAM(s) Oral at bedtime  bisacodyl Suppository 10 milliGRAM(s) Rectal once  cyanocobalamin 1000 MICROGram(s) Oral daily  ergocalciferol 33554 Unit(s) Oral <User Schedule>  ferrous    sulfate 325 milliGRAM(s) Oral daily  gabapentin 100 milliGRAM(s) Oral every 12 hours  heparin   Injectable 5000 Unit(s) SubCutaneous every 8 hours  influenza   Vaccine 0.5 milliLiter(s) IntraMuscular once  insulin lispro (ADMELOG) corrective regimen sliding scale   SubCutaneous Before meals and at bedtime  levoFLOXacin IVPB 750 milliGRAM(s) IV Intermittent every 48 hours  metoprolol succinate  milliGRAM(s) Oral daily  NIFEdipine XL 90 milliGRAM(s) Oral daily  polyethylene glycol 3350 17 Gram(s) Oral daily  senna 2 Tablet(s) Oral at bedtime        RADIOLOGY & ADDITIONAL TESTS:    9/17/21 : MR Foot No Cont, Right (09.17.21 @ 13:04) : Resection at the mid aspect of the fifth metatarsal. Lateral soft tissue wound with marrow signal abnormality throughout the fifth metatarsal and within the adjacent fourth metatarsal head which is nonspecific in the setting of recent surgery although osteomyelitis is suspected.    9/16/21 : Xray Foot AP + Lateral + Oblique, Right (09.16.21 @ 15:03) : No acute finding. No plain film evidence of osteomyelitis.        MICROBIOLOGY DATA:      Urine Microscopic-Add On (NC) (09.22.21 @ 16:14)   Red Blood Cell - Urine: 0-2 /HPF   White Blood Cell - Urine: 3-5 /HPF   Bacteria: Moderate /HPF   Comment - Urine: yeast cells present   Epithelial Cells: Moderate /HPF     Culture - Tissue with Gram Stain (09.22.21 @ 13:54)   Gram Stain: No polymorphonuclear cells seen per low power field   No organisms seen per oil power field   Specimen Source: .Tissue Right 5th toe r/o osteomyeltis   Culture Results: No growth     COVID-19 David Domain Antibody (09.18.21 @ 12:55)   COVID-19 David Domain Antibody Result: >250.00:    Culture - Blood (09.17.21 @ 10:28)   Specimen Source: .Blood Blood-Peripheral   Culture Results: No growth to date.     Culture - Blood (09.17.21 @ 10:28)   Specimen Source: .Blood Blood-Venous   Culture Results: No growth to date.     Culture - Surgical Swab (09.16.21 @ 21:42)   - Amikacin: S <=16   - Amikacin: S <=16   - Amoxicillin/Clavulanic Acid: S <=8/4   - Ampicillin: R >16 These ampicillin results predict results for amoxicillin   - Ampicillin: S <=2 Predicts results to ampicillin/sulbactam, amoxacillin-clavulanate and piperacillin-tazobactam.   - Ampicillin/Sulbactam: S 8/4 Enterobacter, Citrobacter, and Serratia may develop resistance during prolonged therapy (3-4 days)   - Ampicillin/Sulbactam: R >16/8   - Aztreonam: S <=4   - Aztreonam: S <=4   - Cefazolin: R 16 Enterobacter, Citrobacter, and Serratia may develop resistance during prolonged therapy (3-4 days)   - Cefazolin: R >16   - Cefepime: S <=2   - Cefepime: S <=2   - Cefoxitin: S <=8   - Cefoxitin: S <=8   - Ceftazidime: S <=1   - Ceftriaxone: S <=1   - Ceftriaxone: S <=1 Enterobacter, Citrobacter, and Serratia may develop resistance during prolonged therapy   - Ciprofloxacin: S <=0.25   - Ciprofloxacin: S <=0.25   - Ertapenem: S <=0.5   - Gentamicin: S <=2   - Gentamicin: S <=2   - Imipenem: S <=1   - Levofloxacin: S <=0.5   - Levofloxacin: S <=0.5   - Meropenem: S <=1   - Meropenem: S <=1   - Piperacillin/Tazobactam: S <=8   - Piperacillin/Tazobactam: S <=8   - Tetra/Doxy: S 4   - Tobramycin: S <=2   - Trimethoprim/Sulfamethoxazole: S <=0.5/9.5   - Trimethoprim/Sulfamethoxazole: S <=0.5/9.5   - Vancomycin: S 2   Specimen Source: .Surgical Swab right foot wound   Culture Results:   Culture yields >4 types of aerobic and/or anaerobic bacteria   Call client services within 7 days if further workup is clinically   indicated. Culture includes   Rare Aeromonas hydrophila/caviae   Few Klebsiella oxytoca/Raoutella ornithinolytica   Few Enterococcus faecalis   Few Streptococcus agalactiae (Group B) isolated   Group B streptococci are susceptible to ampicillin,   penicillin and cefazolin, but may be resistant to   erythromycin and clindamycin.   Recommendations for intrapartum prophylaxis for Group B   streptococci are penicillin or ampicillin.   Organism Identification: Aeromonas hydrophila/caviae   Klebsiella oxytoca /Raoutella ornithinolytica   Enterococcus faecalis   Organism: Aeromonas hydrophila/caviae   Organism: Klebsiella oxytoca /Raoutella ornithinolytica   Organism: Enterococcus faecalis   COVID-19 PCR . (09.16.21 @ 22:24)   COVID-19 PCR: NotDetec:

## 2021-09-23 NOTE — PROGRESS NOTE ADULT - PROBLEM SELECTOR PLAN 5
DVT: Heparin due to CKD  GI: PPI CAD s/p CABG, severe aortic stenosis --> patient will need TAVR, SABIHA after infectious workup is complete  continue ASA, lipitor, BB, ACEi  EF 55-60%, GIDD   Cardio: Dr. Wilkins

## 2021-09-23 NOTE — PROGRESS NOTE ADULT - PROBLEM SELECTOR PLAN 6
PT for evaluation for safe discharge planning  wound VAC in place   f/u surgical path DVT: Heparin due to CKD  GI: PPI

## 2021-09-23 NOTE — DIETITIAN INITIAL EVALUATION ADULT. - PERTINENT MEDS FT
MEDICATIONS:  ampicillin/sulbactam  IVPB 3 every 12 hours  atorvastatin 80 at bedtime  bisacodyl Suppository 10 once  cyanocobalamin 1000 daily  ergocalciferol 22333 <User Schedule>  ferrous    sulfate 325 daily  gabapentin 100 every 12 hours  heparin   Injectable 5000 every 8 hours  HYDROmorphone  Injectable 0.5 daily PRN  influenza   Vaccine 0.5 once  insulin lispro (ADMELOG) corrective regimen sliding scale  Before meals and at bedtime  levoFLOXacin IVPB 750 every 48 hours  metoprolol succinate  daily  NIFEdipine XL 90 daily  oxyCODONE    IR 5 every 4 hours PRN  polyethylene glycol 3350 17 daily  senna 2 at bedtime

## 2021-09-23 NOTE — PROGRESS NOTE ADULT - ASSESSMENT
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 s/p OR debridement today. Nephrology consulted for abrupt rise in SCr.     1. OREN- likely ATN in the setting of infection and ACEi use vs obstructive uropathy. Lisinopril discontinued 9/22. Pt with worsening renal function. Kidney/ Bladder US with large distended bladder and pt unable to urinate. Recc ibrahim catheter placement.  UA with 30 protein, trace blood with small LE, Check UCX to r/o UTI.   FeNa 0.72% and FeUrea 19.39%; consistent with pre-renal OREN. However renal funciton worsening on IVF.  No peripheral eosinophilia- no signs of AIN. Will f/u C3/ C4 but neg ASO to r/o PIGN. Strict I/Os. Avoid nephrotoxins/ NSAIDs/ RCA. Monitor BMP.  2. 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 Will defer secondary w/u as an outpt. Avoid nephrotoxins  3. HTN 2/2 CKD- BP acceptable. Lisinopril d/c due to OREN. c/w Nifedipine ER 90mg PO qd, titrate as needed. Monitor BP  4. Acute osteomyelitis- s/p debridement 9/22. Pt on Unasyn/ Levaquin renally dosed. Plan as per ID    Discussed with pt the need for ibrahim placement if unable to urinate in the setting of distended bladder to prevent worsening renal function/ rule out obstructive uropathy. Pt hesitant and still wants to try urinating on his own. If pt urinates; please check post void bladder scan. If unable to urinate; please place ibrahim.     [facility] NEPHROLOGY  Bryn Johnson M.D.  Domingo Booth D.O.  Zakia Rodriguez M.D.  Zonia Adams, MSN, ANP-C  [phone]    [address] [place] [address]

## 2021-09-23 NOTE — PROGRESS NOTE ADULT - PROBLEM SELECTOR PLAN 1
as a consequence of poorly controlled diabetes   continue with Unasyn, Levofloxacin --> renally dosed   afebrile,  no leukocytosis  s/p OR for wound debridement 9/22  local wound care as per podiatry with wound vac.  ESR, CRP elevated   Dr. Wilkins consulted for cardiac clearance  ID: Dr. Barrett  pain management following   wound VAC in place   Podiatry onboard

## 2021-09-23 NOTE — PROGRESS NOTE ADULT - PROBLEM SELECTOR PLAN 2
OREN superimposed on Stage III CKD  concern for AIN 2/2 antibiotics   Creatinine 3.2 this AM with bicarb 18, trending up  f/uurine lytes to calculate FENa, c/w IVF for 12 hours   avoid nephrotoxic agents, dose as per GFR  monitor creatinine, electrolytes  holding Lisinopril, Nifedipine incr. 90 mg daily   f/u C3/ C4 and ASO to r/o PIGN  f/u urine eosinophils to r/o AIN  renal ultrasound   bladder scan to r/o retention  Nephrology Dr. Rodriguez consulted - patient not voiding for last 24 hours  - possible cause of ARF  - RBUS with hypoechoic kidneys and 500cc bladder  - patient encouraged to void, but unable to  - Molina catheter placed with 350cc urine drained immediately  - start patient on flomax  - monitor UO and Cr, if improving may attempt TOV in a few days

## 2021-09-23 NOTE — PROGRESS NOTE ADULT - ATTENDING COMMENTS
Patient unable to void for the past 24 hours. No LE edema or respiratory distress, patient appears euvolemic. Patient unable to void for the past 24 hours. No LE edema or respiratory distress, patient appears euvolemic. RBUS with significant urine in bladder, patient unable to void with encouragement. Molina placed with 350cc immediately drain. F/u U/O, trend Cr. If improving will do TOV in 1-2 days. Start flomax. Minimize anti-cholinergic medications, will see if able to minimize opioids. F/u wound culture. Dose abx for CrCl. F/u with podiatry if needs repeat procedure. Discharge to [facility] when medically ready.

## 2021-09-24 LAB
ALBUMIN SERPL ELPH-MCNC: 2.1 G/DL — LOW (ref 3.5–5)
ALP SERPL-CCNC: 99 U/L — SIGNIFICANT CHANGE UP (ref 40–120)
ALT FLD-CCNC: 12 U/L DA — SIGNIFICANT CHANGE UP (ref 10–60)
ANION GAP SERPL CALC-SCNC: 12 MMOL/L — SIGNIFICANT CHANGE UP (ref 5–17)
AST SERPL-CCNC: 21 U/L — SIGNIFICANT CHANGE UP (ref 10–40)
BILIRUB SERPL-MCNC: 0.4 MG/DL — SIGNIFICANT CHANGE UP (ref 0.2–1.2)
BUN SERPL-MCNC: 36 MG/DL — HIGH (ref 7–18)
CALCIUM SERPL-MCNC: 8.4 MG/DL — SIGNIFICANT CHANGE UP (ref 8.4–10.5)
CHLORIDE SERPL-SCNC: 109 MMOL/L — HIGH (ref 96–108)
CO2 SERPL-SCNC: 20 MMOL/L — LOW (ref 22–31)
CREAT SERPL-MCNC: 3.97 MG/DL — HIGH (ref 0.5–1.3)
GLUCOSE BLDC GLUCOMTR-MCNC: 143 MG/DL — HIGH (ref 70–99)
GLUCOSE BLDC GLUCOMTR-MCNC: 154 MG/DL — HIGH (ref 70–99)
GLUCOSE BLDC GLUCOMTR-MCNC: 157 MG/DL — HIGH (ref 70–99)
GLUCOSE BLDC GLUCOMTR-MCNC: 94 MG/DL — SIGNIFICANT CHANGE UP (ref 70–99)
GLUCOSE SERPL-MCNC: 101 MG/DL — HIGH (ref 70–99)
HCT VFR BLD CALC: 28.7 % — LOW (ref 39–50)
HGB BLD-MCNC: 9.4 G/DL — LOW (ref 13–17)
MCHC RBC-ENTMCNC: 29.4 PG — SIGNIFICANT CHANGE UP (ref 27–34)
MCHC RBC-ENTMCNC: 32.8 GM/DL — SIGNIFICANT CHANGE UP (ref 32–36)
MCV RBC AUTO: 89.7 FL — SIGNIFICANT CHANGE UP (ref 80–100)
NRBC # BLD: 0 /100 WBCS — SIGNIFICANT CHANGE UP (ref 0–0)
PLATELET # BLD AUTO: 195 K/UL — SIGNIFICANT CHANGE UP (ref 150–400)
POTASSIUM SERPL-MCNC: 4.1 MMOL/L — SIGNIFICANT CHANGE UP (ref 3.5–5.3)
POTASSIUM SERPL-SCNC: 4.1 MMOL/L — SIGNIFICANT CHANGE UP (ref 3.5–5.3)
PROT SERPL-MCNC: 6.1 G/DL — SIGNIFICANT CHANGE UP (ref 6–8.3)
RBC # BLD: 3.2 M/UL — LOW (ref 4.2–5.8)
RBC # FLD: 13.1 % — SIGNIFICANT CHANGE UP (ref 10.3–14.5)
SODIUM SERPL-SCNC: 141 MMOL/L — SIGNIFICANT CHANGE UP (ref 135–145)
WBC # BLD: 4.86 K/UL — SIGNIFICANT CHANGE UP (ref 3.8–10.5)
WBC # FLD AUTO: 4.86 K/UL — SIGNIFICANT CHANGE UP (ref 3.8–10.5)

## 2021-09-24 PROCEDURE — 99233 SBSQ HOSP IP/OBS HIGH 50: CPT

## 2021-09-24 PROCEDURE — 99231 SBSQ HOSP IP/OBS SF/LOW 25: CPT

## 2021-09-24 RX ADMIN — OXYCODONE HYDROCHLORIDE 5 MILLIGRAM(S): 5 TABLET ORAL at 23:54

## 2021-09-24 RX ADMIN — Medication 1000 MILLIGRAM(S): at 13:20

## 2021-09-24 RX ADMIN — OXYCODONE HYDROCHLORIDE 5 MILLIGRAM(S): 5 TABLET ORAL at 09:12

## 2021-09-24 RX ADMIN — Medication 1: at 17:33

## 2021-09-24 RX ADMIN — GABAPENTIN 100 MILLIGRAM(S): 400 CAPSULE ORAL at 05:57

## 2021-09-24 RX ADMIN — HYDROMORPHONE HYDROCHLORIDE 0.5 MILLIGRAM(S): 2 INJECTION INTRAMUSCULAR; INTRAVENOUS; SUBCUTANEOUS at 01:00

## 2021-09-24 RX ADMIN — FLUCONAZOLE 100 MILLIGRAM(S): 150 TABLET ORAL at 20:49

## 2021-09-24 RX ADMIN — HEPARIN SODIUM 5000 UNIT(S): 5000 INJECTION INTRAVENOUS; SUBCUTANEOUS at 05:57

## 2021-09-24 RX ADMIN — ONDANSETRON 4 MILLIGRAM(S): 8 TABLET, FILM COATED ORAL at 14:59

## 2021-09-24 RX ADMIN — SENNA PLUS 2 TABLET(S): 8.6 TABLET ORAL at 21:03

## 2021-09-24 RX ADMIN — Medication 1000 MILLIGRAM(S): at 06:56

## 2021-09-24 RX ADMIN — HYDROMORPHONE HYDROCHLORIDE 0.5 MILLIGRAM(S): 2 INJECTION INTRAMUSCULAR; INTRAVENOUS; SUBCUTANEOUS at 02:46

## 2021-09-24 RX ADMIN — HEPARIN SODIUM 5000 UNIT(S): 5000 INJECTION INTRAVENOUS; SUBCUTANEOUS at 21:03

## 2021-09-24 RX ADMIN — ATORVASTATIN CALCIUM 80 MILLIGRAM(S): 80 TABLET, FILM COATED ORAL at 21:02

## 2021-09-24 RX ADMIN — Medication 325 MILLIGRAM(S): at 11:17

## 2021-09-24 RX ADMIN — AMPICILLIN SODIUM AND SULBACTAM SODIUM 200 GRAM(S): 250; 125 INJECTION, POWDER, FOR SUSPENSION INTRAMUSCULAR; INTRAVENOUS at 17:33

## 2021-09-24 RX ADMIN — HEPARIN SODIUM 5000 UNIT(S): 5000 INJECTION INTRAVENOUS; SUBCUTANEOUS at 13:18

## 2021-09-24 RX ADMIN — Medication 1: at 22:17

## 2021-09-24 RX ADMIN — Medication 1000 MILLIGRAM(S): at 14:18

## 2021-09-24 RX ADMIN — Medication 90 MILLIGRAM(S): at 05:56

## 2021-09-24 RX ADMIN — PREGABALIN 1000 MICROGRAM(S): 225 CAPSULE ORAL at 11:17

## 2021-09-24 RX ADMIN — POLYETHYLENE GLYCOL 3350 17 GRAM(S): 17 POWDER, FOR SOLUTION ORAL at 11:17

## 2021-09-24 RX ADMIN — OXYCODONE HYDROCHLORIDE 5 MILLIGRAM(S): 5 TABLET ORAL at 08:12

## 2021-09-24 RX ADMIN — OXYCODONE HYDROCHLORIDE 5 MILLIGRAM(S): 5 TABLET ORAL at 22:48

## 2021-09-24 RX ADMIN — Medication 1000 MILLIGRAM(S): at 05:57

## 2021-09-24 RX ADMIN — ONDANSETRON 4 MILLIGRAM(S): 8 TABLET, FILM COATED ORAL at 21:17

## 2021-09-24 RX ADMIN — TAMSULOSIN HYDROCHLORIDE 0.4 MILLIGRAM(S): 0.4 CAPSULE ORAL at 21:02

## 2021-09-24 RX ADMIN — AMPICILLIN SODIUM AND SULBACTAM SODIUM 200 GRAM(S): 250; 125 INJECTION, POWDER, FOR SUSPENSION INTRAMUSCULAR; INTRAVENOUS at 05:56

## 2021-09-24 NOTE — DISCHARGE NOTE PROVIDER - NSDCMRMEDTOKEN_GEN_ALL_CORE_FT
aspirin 81 mg oral tablet, chewable: 1 tab(s) orally once a day  cyanocobalamin 1000 mcg oral tablet: 1 tab(s) orally once a day  Drisdol 1.25 mg (50,000 intl units) oral capsule: 1 cap(s) orally once a week   ferrous sulfate 325 mg (65 mg elemental iron) oral tablet: 1 tab(s) orally once a day  Lantus Solostar Pen 100 units/mL subcutaneous solution: 10 unit(s) subcutaneous once a day (at bedtime)   lisinopril 10 mg oral tablet: 1 tab(s) orally once a day  metoprolol succinate 100 mg oral tablet, extended release: 1 tab(s) orally once a day  NIFEdipine 60 mg oral tablet, extended release: 1 tab(s) orally once a day  rosuvastatin 40 mg oral tablet: 1 tab(s) orally once a day  Tylenol 325 mg oral capsule: 2 cap(s) orally every 8 hours    ampicillin-sulbactam: 3 gram(s) intravenous every 8 hours  aspirin 81 mg oral tablet, chewable: 1 tab(s) orally once a day  bisacodyl 10 mg rectal suppository: 1 suppository(ies) rectal once  cyanocobalamin 1000 mcg oral tablet: 1 tab(s) orally once a day  Drisdol 1.25 mg (50,000 intl units) oral capsule: 1 cap(s) orally once a week   ferrous sulfate 325 mg (65 mg elemental iron) oral tablet: 1 tab(s) orally once a day  finasteride 5 mg oral tablet: 1 tab(s) orally once a day  fluconazole: 100 milligram(s) intravenous once a day  Lantus Solostar Pen 100 units/mL subcutaneous solution: 10 unit(s) subcutaneous once a day (at bedtime)   levoFLOXacin 250 mg oral tablet: 1 tab(s) orally every 24 hours  lisinopril 10 mg oral tablet: 1 tab(s) orally once a day  metoprolol succinate 100 mg oral tablet, extended release: 1 tab(s) orally once a day  NIFEdipine 60 mg oral tablet, extended release: 1 tab(s) orally once a day  pantoprazole 40 mg oral delayed release tablet: 1 tab(s) orally once a day (before a meal)  polyethylene glycol 3350 oral powder for reconstitution: 17 gram(s) orally once a day  rosuvastatin 40 mg oral tablet: 1 tab(s) orally once a day  senna oral tablet: 2 tab(s) orally once a day (at bedtime)  tamsulosin 0.4 mg oral capsule: 1 cap(s) orally once a day (at bedtime)  Tylenol 325 mg oral capsule: 2 cap(s) orally every 8 hours    apixaban 5 mg oral tablet: 1 tab(s) orally every 12 hours  aspirin 81 mg oral tablet, chewable: 1 tab(s) orally once a day  bisacodyl 10 mg rectal suppository: 1 suppository(ies) rectal once  collagenase 250 units/g topical ointment: 1 application topically once a day  cyanocobalamin 1000 mcg oral tablet: 1 tab(s) orally once a day  Drisdol 1.25 mg (50,000 intl units) oral capsule: 1 cap(s) orally once a week   ferrous sulfate 325 mg (65 mg elemental iron) oral tablet: 1 tab(s) orally once a day  finasteride 5 mg oral tablet: 1 tab(s) orally once a day  fluconazole: 100 milligram(s) intravenous once a day  gabapentin 100 mg oral capsule: 1 cap(s) orally 3 times a day  Lantus Solostar Pen 100 units/mL subcutaneous solution: 10 unit(s) subcutaneous once a day (at bedtime)   levoFLOXacin 250 mg oral tablet: 1 tab(s) orally every 24 hours  lisinopril 10 mg oral tablet: 1 tab(s) orally once a day  metoprolol succinate 100 mg oral tablet, extended release: 1 tab(s) orally once a day  NIFEdipine 60 mg oral tablet, extended release: 1 tab(s) orally once a day  pantoprazole 40 mg oral delayed release tablet: 1 tab(s) orally once a day (before a meal)  polyethylene glycol 3350 oral powder for reconstitution: 17 gram(s) orally once a day  rosuvastatin 40 mg oral tablet: 1 tab(s) orally once a day  senna oral tablet: 2 tab(s) orally once a day (at bedtime)  tamsulosin 0.4 mg oral capsule: 1 cap(s) orally once a day (at bedtime)   apixaban 5 mg oral tablet: 1 tab(s) orally every 12 hours  aspirin 81 mg oral tablet, chewable: 1 tab(s) orally once a day  bisacodyl 10 mg rectal suppository: 1 suppository(ies) rectal once  collagenase 250 units/g topical ointment: 1 application topically once a day  cyanocobalamin 1000 mcg oral tablet: 1 tab(s) orally once a day  Drisdol 1.25 mg (50,000 intl units) oral capsule: 1 cap(s) orally once a week   ferrous sulfate 325 mg (65 mg elemental iron) oral tablet: 1 tab(s) orally once a day  finasteride 5 mg oral tablet: 1 tab(s) orally once a day  fluconazole: 100 milligram(s) intravenous once a day  gabapentin 100 mg oral capsule: 1 cap(s) orally 3 times a day  Lantus Solostar Pen 100 units/mL subcutaneous solution: 10 unit(s) subcutaneous once a day (at bedtime)   lisinopril 10 mg oral tablet: 1 tab(s) orally once a day  metoprolol succinate 100 mg oral tablet, extended release: 1 tab(s) orally once a day  NIFEdipine 60 mg oral tablet, extended release: 1 tab(s) orally once a day  pantoprazole 40 mg oral delayed release tablet: 1 tab(s) orally once a day (before a meal)  polyethylene glycol 3350 oral powder for reconstitution: 17 gram(s) orally once a day  rosuvastatin 40 mg oral tablet: 1 tab(s) orally once a day  senna oral tablet: 2 tab(s) orally once a day (at bedtime)  tamsulosin 0.4 mg oral capsule: 1 cap(s) orally once a day (at bedtime)

## 2021-09-24 NOTE — PROGRESS NOTE ADULT - ASSESSMENT
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 s/p OR debridement today. Nephrology consulted for abrupt rise in SCr.     1. OREN- likely ATN in the setting of infection and ACEi use. Lisinopril discontinued 9/22. Pt with worsening renal function. Kidney/ Bladder US with large distended bladder s/p ibrahim placement on 9/23; renal function did not improve post ibrahim; less likely due to obstructive uropathy. UA with 30 protein, trace blood with small LE, Check UCX to r/o UTI.   FeNa 0.72% and FeUrea 19.39%; consistent with pre-renal OREN. However renal function worsening on IVF.  No peripheral eosinophilia- no signs of AIN. C3 & C4 wnl with neg ASO- no signs of PIGN. Strict I/Os. Avoid nephrotoxins/ NSAIDs/ RCA. Monitor BMP.  2. 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 Will defer secondary w/u as an outpt. Avoid nephrotoxins  3. HTN 2/2 CKD- BP low normal. Lisinopril d/c due to OREN. c/w Nifedipine ER 90mg PO qd, titrate as needed. Monitor BP  4. Acute osteomyelitis- s/p debridement 9/22. Pt on Unasyn/ Levaquin renally dosed. Plan as per ID      [facility] NEPHROLOGY  Bryn Johnson M.D.  Domingo Booth D.O.  Zakia Rodriguez M.D.  Zonia Adams, MSN, ANP-C  [phone]    [address] [place] [address]

## 2021-09-24 NOTE — PROGRESS NOTE ADULT - SUBJECTIVE AND OBJECTIVE BOX
Patient is seen and examined at the bed side, is afebrile.  The kidney function is worsening.       REVIEW OF SYSTEMS: All other review systems are negative      ALLERGIES: No Known Allergies      Vital Signs Last 24 Hrs  T(C): 37.3 (24 Sep 2021 12:56), Max: 37.3 (24 Sep 2021 12:56)  T(F): 99.1 (24 Sep 2021 12:56), Max: 99.1 (24 Sep 2021 12:56)  HR: 81 (24 Sep 2021 12:56) (76 - 87)  BP: 101/50 (24 Sep 2021 12:56) (101/50 - 129/50)  BP(mean): --  RR: 19 (24 Sep 2021 12:56) (18 - 19)  SpO2: 95% (24 Sep 2021 12:56) (95% - 96%)      PHYSICAL EXAM:  GENERAL: Not in distress   CHEST/LUNG:  Not using accessory muscles  HEART: s1 and s2 present  ABDOMEN:  Nontender and  Nondistended  EXTREMITIES: Right foot bandage in placed   CNS: Awake and Alert      LABS:                                   9.4    4.86  )-----------( 195      ( 24 Sep 2021 06:15 )             28.7                9.3    4.84  )-----------( 198      ( 23 Sep 2021 06:28 )             28.6            09-24    141  |  109<H>  |  36<H>  ----------------------------<  101<H>  4.1   |  20<L>  |  3.97<H>    Ca    8.4      24 Sep 2021 06:15  Phos  3.6     09-23    TPro  6.1  /  Alb  2.1<L>  /  TBili  0.4  /  DBili  x   /  AST  21  /  ALT  12  /  AlkPhos  99  09-24 09-23    142  |  109<H>  |  30<H>  ----------------------------<  98  4.2   |  19<L>  |  3.20<H>    Ca    8.6      23 Sep 2021 06:28  Phos  3.6     09-23 09-19    137  |  107  |  17  ----------------------------<  82  4.1   |  22  |  1.85<H>    Ca    9.1      19 Sep 2021 06:28    TPro  7.2  /  Alb  2.7<L>  /  TBili  0.6  /  DBili  x   /  AST  10  /  ALT  13  /  AlkPhos  97  09-19        CAPILLARY BLOOD GLUCOSE  POCT Blood Glucose.: 134 mg/dL (17 Sep 2021 17:11)  POCT Blood Glucose.: 128 mg/dL (17 Sep 2021 11:06)  POCT Blood Glucose.: 157 mg/dL (17 Sep 2021 04:10)      MEDICATIONS  (STANDING):    ampicillin/sulbactam  IVPB 3 Gram(s) IV Intermittent every 12 hours  atorvastatin 80 milliGRAM(s) Oral at bedtime  bisacodyl Suppository 10 milliGRAM(s) Rectal once  cyanocobalamin 1000 MICROGram(s) Oral daily  ergocalciferol 69676 Unit(s) Oral <User Schedule>  ferrous    sulfate 325 milliGRAM(s) Oral daily  fluconAZOLE IVPB      fluconAZOLE IVPB 100 milliGRAM(s) IV Intermittent every 24 hours  heparin   Injectable 5000 Unit(s) SubCutaneous every 8 hours  influenza   Vaccine 0.5 milliLiter(s) IntraMuscular once  insulin lispro (ADMELOG) corrective regimen sliding scale   SubCutaneous Before meals and at bedtime  levoFLOXacin  Tablet 250 milliGRAM(s) Oral every 48 hours  metoprolol succinate  milliGRAM(s) Oral daily  NIFEdipine XL 90 milliGRAM(s) Oral daily  polyethylene glycol 3350 17 Gram(s) Oral daily  senna 2 Tablet(s) Oral at bedtime  tamsulosin 0.4 milliGRAM(s) Oral at bedtime        RADIOLOGY & ADDITIONAL TESTS:    9/17/21 : MR Foot No Cont, Right (09.17.21 @ 13:04) : Resection at the mid aspect of the fifth metatarsal. Lateral soft tissue wound with marrow signal abnormality throughout the fifth metatarsal and within the adjacent fourth metatarsal head which is nonspecific in the setting of recent surgery although osteomyelitis is suspected.    9/16/21 : Xray Foot AP + Lateral + Oblique, Right (09.16.21 @ 15:03) : No acute finding. No plain film evidence of osteomyelitis.        MICROBIOLOGY DATA:      Urine Microscopic-Add On (NC) (09.22.21 @ 16:14)   Red Blood Cell - Urine: 0-2 /HPF   White Blood Cell - Urine: 3-5 /HPF   Bacteria: Moderate /HPF   Comment - Urine: yeast cells present   Epithelial Cells: Moderate /HPF     Culture - Tissue with Gram Stain (09.22.21 @ 13:54)   Gram Stain: No polymorphonuclear cells seen per low power field   No organisms seen per oil power field   Specimen Source: .Tissue Right 5th toe r/o osteomyeltis   Culture Results: No growth     COVID-19 David Domain Antibody (09.18.21 @ 12:55)   COVID-19 David Domain Antibody Result: >250.00:    Culture - Blood (09.17.21 @ 10:28)   Specimen Source: .Blood Blood-Peripheral   Culture Results: No growth to date.     Culture - Blood (09.17.21 @ 10:28)   Specimen Source: .Blood Blood-Venous   Culture Results: No growth to date.     Culture - Surgical Swab (09.16.21 @ 21:42)   - Amikacin: S <=16   - Amikacin: S <=16   - Amoxicillin/Clavulanic Acid: S <=8/4   - Ampicillin: R >16 These ampicillin results predict results for amoxicillin   - Ampicillin: S <=2 Predicts results to ampicillin/sulbactam, amoxacillin-clavulanate and piperacillin-tazobactam.   - Ampicillin/Sulbactam: S 8/4 Enterobacter, Citrobacter, and Serratia may develop resistance during prolonged therapy (3-4 days)   - Ampicillin/Sulbactam: R >16/8   - Aztreonam: S <=4   - Aztreonam: S <=4   - Cefazolin: R 16 Enterobacter, Citrobacter, and Serratia may develop resistance during prolonged therapy (3-4 days)   - Cefazolin: R >16   - Cefepime: S <=2   - Cefepime: S <=2   - Cefoxitin: S <=8   - Cefoxitin: S <=8   - Ceftazidime: S <=1   - Ceftriaxone: S <=1   - Ceftriaxone: S <=1 Enterobacter, Citrobacter, and Serratia may develop resistance during prolonged therapy   - Ciprofloxacin: S <=0.25   - Ciprofloxacin: S <=0.25   - Ertapenem: S <=0.5   - Gentamicin: S <=2   - Gentamicin: S <=2   - Imipenem: S <=1   - Levofloxacin: S <=0.5   - Levofloxacin: S <=0.5   - Meropenem: S <=1   - Meropenem: S <=1   - Piperacillin/Tazobactam: S <=8   - Piperacillin/Tazobactam: S <=8   - Tetra/Doxy: S 4   - Tobramycin: S <=2   - Trimethoprim/Sulfamethoxazole: S <=0.5/9.5   - Trimethoprim/Sulfamethoxazole: S <=0.5/9.5   - Vancomycin: S 2   Specimen Source: .Surgical Swab right foot wound   Culture Results:   Culture yields >4 types of aerobic and/or anaerobic bacteria   Call client services within 7 days if further workup is clinically   indicated. Culture includes   Rare Aeromonas hydrophila/caviae   Few Klebsiella oxytoca/Raoutella ornithinolytica   Few Enterococcus faecalis   Few Streptococcus agalactiae (Group B) isolated   Group B streptococci are susceptible to ampicillin,   penicillin and cefazolin, but may be resistant to   erythromycin and clindamycin.   Recommendations for intrapartum prophylaxis for Group B   streptococci are penicillin or ampicillin.   Organism Identification: Aeromonas hydrophila/caviae   Klebsiella oxytoca /Raoutella ornithinolytica   Enterococcus faecalis   Organism: Aeromonas hydrophila/caviae   Organism: Klebsiella oxytoca /Raoutella ornithinolytica   Organism: Enterococcus faecalis   COVID-19 PCR . (09.16.21 @ 22:24)   COVID-19 PCR: NotDetec:               Patient is seen and examined at the bed side, is afebrile. He seems confused today.  The kidney function is worsening.       REVIEW OF SYSTEMS: All other review systems are negative      ALLERGIES: No Known Allergies      Vital Signs Last 24 Hrs  T(C): 37.3 (24 Sep 2021 12:56), Max: 37.3 (24 Sep 2021 12:56)  T(F): 99.1 (24 Sep 2021 12:56), Max: 99.1 (24 Sep 2021 12:56)  HR: 81 (24 Sep 2021 12:56) (76 - 87)  BP: 101/50 (24 Sep 2021 12:56) (101/50 - 129/50)  BP(mean): --  RR: 19 (24 Sep 2021 12:56) (18 - 19)  SpO2: 95% (24 Sep 2021 12:56) (95% - 96%)      PHYSICAL EXAM:  GENERAL: Not in distress   CHEST/LUNG:  Not using accessory muscles  HEART: s1 and s2 present  ABDOMEN:  Nontender and  Nondistended  EXTREMITIES: Right foot bandage in placed   CNS: Awake and Alert      LABS:                                   9.4    4.86  )-----------( 195      ( 24 Sep 2021 06:15 )             28.7                9.3    4.84  )-----------( 198      ( 23 Sep 2021 06:28 )             28.6            09-24    141  |  109<H>  |  36<H>  ----------------------------<  101<H>  4.1   |  20<L>  |  3.97<H>    Ca    8.4      24 Sep 2021 06:15  Phos  3.6     09-23    TPro  6.1  /  Alb  2.1<L>  /  TBili  0.4  /  DBili  x   /  AST  21  /  ALT  12  /  AlkPhos  99  09-24 09-23    142  |  109<H>  |  30<H>  ----------------------------<  98  4.2   |  19<L>  |  3.20<H>    Ca    8.6      23 Sep 2021 06:28  Phos  3.6     09-23    09-19    137  |  107  |  17  ----------------------------<  82  4.1   |  22  |  1.85<H>    Ca    9.1      19 Sep 2021 06:28    TPro  7.2  /  Alb  2.7<L>  /  TBili  0.6  /  DBili  x   /  AST  10  /  ALT  13  /  AlkPhos  97  09-19        CAPILLARY BLOOD GLUCOSE  POCT Blood Glucose.: 134 mg/dL (17 Sep 2021 17:11)  POCT Blood Glucose.: 128 mg/dL (17 Sep 2021 11:06)  POCT Blood Glucose.: 157 mg/dL (17 Sep 2021 04:10)      MEDICATIONS  (STANDING):    ampicillin/sulbactam  IVPB 3 Gram(s) IV Intermittent every 12 hours  atorvastatin 80 milliGRAM(s) Oral at bedtime  bisacodyl Suppository 10 milliGRAM(s) Rectal once  cyanocobalamin 1000 MICROGram(s) Oral daily  ergocalciferol 80552 Unit(s) Oral <User Schedule>  ferrous    sulfate 325 milliGRAM(s) Oral daily  fluconAZOLE IVPB      fluconAZOLE IVPB 100 milliGRAM(s) IV Intermittent every 24 hours  heparin   Injectable 5000 Unit(s) SubCutaneous every 8 hours  influenza   Vaccine 0.5 milliLiter(s) IntraMuscular once  insulin lispro (ADMELOG) corrective regimen sliding scale   SubCutaneous Before meals and at bedtime  levoFLOXacin  Tablet 250 milliGRAM(s) Oral every 48 hours  metoprolol succinate  milliGRAM(s) Oral daily  NIFEdipine XL 90 milliGRAM(s) Oral daily  polyethylene glycol 3350 17 Gram(s) Oral daily  senna 2 Tablet(s) Oral at bedtime  tamsulosin 0.4 milliGRAM(s) Oral at bedtime        RADIOLOGY & ADDITIONAL TESTS:    9/17/21 : MR Foot No Cont, Right (09.17.21 @ 13:04) : Resection at the mid aspect of the fifth metatarsal. Lateral soft tissue wound with marrow signal abnormality throughout the fifth metatarsal and within the adjacent fourth metatarsal head which is nonspecific in the setting of recent surgery although osteomyelitis is suspected.    9/16/21 : Xray Foot AP + Lateral + Oblique, Right (09.16.21 @ 15:03) : No acute finding. No plain film evidence of osteomyelitis.        MICROBIOLOGY DATA:      Urine Microscopic-Add On (NC) (09.22.21 @ 16:14)   Red Blood Cell - Urine: 0-2 /HPF   White Blood Cell - Urine: 3-5 /HPF   Bacteria: Moderate /HPF   Comment - Urine: yeast cells present   Epithelial Cells: Moderate /HPF     Culture - Tissue with Gram Stain (09.22.21 @ 13:54)   Gram Stain: No polymorphonuclear cells seen per low power field   No organisms seen per oil power field   Specimen Source: .Tissue Right 5th toe r/o osteomyeltis   Culture Results: No growth     COVID-19 David Domain Antibody (09.18.21 @ 12:55)   COVID-19 David Domain Antibody Result: >250.00:    Culture - Blood (09.17.21 @ 10:28)   Specimen Source: .Blood Blood-Peripheral   Culture Results: No growth to date.     Culture - Blood (09.17.21 @ 10:28)   Specimen Source: .Blood Blood-Venous   Culture Results: No growth to date.     Culture - Surgical Swab (09.16.21 @ 21:42)   - Amikacin: S <=16   - Amikacin: S <=16   - Amoxicillin/Clavulanic Acid: S <=8/4   - Ampicillin: R >16 These ampicillin results predict results for amoxicillin   - Ampicillin: S <=2 Predicts results to ampicillin/sulbactam, amoxacillin-clavulanate and piperacillin-tazobactam.   - Ampicillin/Sulbactam: S 8/4 Enterobacter, Citrobacter, and Serratia may develop resistance during prolonged therapy (3-4 days)   - Ampicillin/Sulbactam: R >16/8   - Aztreonam: S <=4   - Aztreonam: S <=4   - Cefazolin: R 16 Enterobacter, Citrobacter, and Serratia may develop resistance during prolonged therapy (3-4 days)   - Cefazolin: R >16   - Cefepime: S <=2   - Cefepime: S <=2   - Cefoxitin: S <=8   - Cefoxitin: S <=8   - Ceftazidime: S <=1   - Ceftriaxone: S <=1   - Ceftriaxone: S <=1 Enterobacter, Citrobacter, and Serratia may develop resistance during prolonged therapy   - Ciprofloxacin: S <=0.25   - Ciprofloxacin: S <=0.25   - Ertapenem: S <=0.5   - Gentamicin: S <=2   - Gentamicin: S <=2   - Imipenem: S <=1   - Levofloxacin: S <=0.5   - Levofloxacin: S <=0.5   - Meropenem: S <=1   - Meropenem: S <=1   - Piperacillin/Tazobactam: S <=8   - Piperacillin/Tazobactam: S <=8   - Tetra/Doxy: S 4   - Tobramycin: S <=2   - Trimethoprim/Sulfamethoxazole: S <=0.5/9.5   - Trimethoprim/Sulfamethoxazole: S <=0.5/9.5   - Vancomycin: S 2   Specimen Source: .Surgical Swab right foot wound   Culture Results:   Culture yields >4 types of aerobic and/or anaerobic bacteria   Call client services within 7 days if further workup is clinically   indicated. Culture includes   Rare Aeromonas hydrophila/caviae   Few Klebsiella oxytoca/Raoutella ornithinolytica   Few Enterococcus faecalis   Few Streptococcus agalactiae (Group B) isolated   Group B streptococci are susceptible to ampicillin,   penicillin and cefazolin, but may be resistant to   erythromycin and clindamycin.   Recommendations for intrapartum prophylaxis for Group B   streptococci are penicillin or ampicillin.   Organism Identification: Aeromonas hydrophila/caviae   Klebsiella oxytoca /Raoutella ornithinolytica   Enterococcus faecalis   Organism: Aeromonas hydrophila/caviae   Organism: Klebsiella oxytoca /Raoutella ornithinolytica   Organism: Enterococcus faecalis   COVID-19 PCR . (09.16.21 @ 22:24)   COVID-19 PCR: NotDetec:

## 2021-09-24 NOTE — PROGRESS NOTE ADULT - PROBLEM SELECTOR PLAN 3
OREN superimposed on Stage III CKD  concern for AIN 2/2 antibiotics   Creatinine 3.2 this AM with bicarb 18, trending up  f/u urine lytes to calculate FENa, c/w IVF for 12 hours   avoid nephrotoxic agents, dose as per GFR  monitor creatinine, electrolytes  holding Lisinopril, Nifedipine incr. 90 mg daily   f/u C3/ C4; ASO negative  Urine eosinophils negative  renal ultrasound negative for hydronephrosis, hyperechoic kidneys correlate clinically   bladder scan to r/o retention  Nephrology Dr. Rodriguez consulted

## 2021-09-24 NOTE — PROGRESS NOTE ADULT - PROBLEM SELECTOR PLAN 2
patient not voiding for last 24 hours  possible cause of ARF  RBUS with hypoechoic kidneys and 500cc bladder  patient encouraged to void, but unable to  [place] catheter placed  c/w flomax  monitor UO and Cr, if improving may attempt TOV in a few days  Nephrology dr. Rodriguez

## 2021-09-24 NOTE — PROGRESS NOTE ADULT - SUBJECTIVE AND OBJECTIVE BOX
Podiatry Interval HPI: Pt seen bedside AAOx3. Examined with attending, s/p R 5th wound debridement, graft application, bone biopsy and wound vac application 9/22. Patient reports some pain to the surgical site. Vac functioning well. No acute event overnight.  Denies constitutional symptoms. No other pedal complaints.     Podiatry HPI: 76 year old male with a PMHx of DM, HTN, HLD, CAD to ED presents to the ED for a Right Foot s/p 5th foot partial ray resection and fillet toe flap. Patient states that he has sharp localized non-radiating pain to the Right foot 5th metatarsal. States that after his surgery, he did not see a podiatrist and he came into the ED because he saw drainage and was having pain. Denies any constitutional symptoms of N/V/C/F/SOB. Denies any other pedal complaints at this time. Denies calf pain today, bilaterally.    Medications acetaminophen   Tablet .. 1000 milliGRAM(s) Oral every 8 hours  ampicillin/sulbactam  IVPB 3 Gram(s) IV Intermittent every 12 hours  atorvastatin 80 milliGRAM(s) Oral at bedtime  bisacodyl Suppository 10 milliGRAM(s) Rectal once  cyanocobalamin 1000 MICROGram(s) Oral daily  ergocalciferol 16286 Unit(s) Oral <User Schedule>  ferrous    sulfate 325 milliGRAM(s) Oral daily  fluconAZOLE IVPB      fluconAZOLE IVPB 100 milliGRAM(s) IV Intermittent every 24 hours  heparin   Injectable 5000 Unit(s) SubCutaneous every 8 hours  HYDROmorphone  Injectable 0.5 milliGRAM(s) IV Push daily PRN  influenza   Vaccine 0.5 milliLiter(s) IntraMuscular once  insulin lispro (ADMELOG) corrective regimen sliding scale   SubCutaneous Before meals and at bedtime  levoFLOXacin  Tablet 250 milliGRAM(s) Oral every 48 hours  metoprolol succinate  milliGRAM(s) Oral daily  NIFEdipine XL 90 milliGRAM(s) Oral daily  ondansetron Injectable 4 milliGRAM(s) IV Push three times a day PRN  oxyCODONE    IR 5 milliGRAM(s) Oral every 4 hours PRN  polyethylene glycol 3350 17 Gram(s) Oral daily  senna 2 Tablet(s) Oral at bedtime  tamsulosin 0.4 milliGRAM(s) Oral at bedtime    FHFamily history of acute myocardial infarction (Father)    Family history of diabetes mellitus (Mother, Sibling)    Family history of hypertension (Mother, Sibling)    Family history of hyperlipidemia (Mother, Sibling)    ,   PMHHTN (hypertension)    HLD (hyperlipidemia)    DM (diabetes mellitus)    CAD (coronary artery disease)    Glaucoma, angle-closure    [place] (hard of hearing)       PSHNo significant past surgical history    S/P CABG (coronary artery bypass graft)    History of thyroid surgery        Labs                          9.4    4.86  )-----------( 195      ( 24 Sep 2021 06:15 )             28.7      09-24    141  |  109<H>  |  36<H>  ----------------------------<  101<H>  4.1   |  20<L>  |  3.97<H>    Ca    8.4      24 Sep 2021 06:15  Phos  3.6     09-23    TPro  6.1  /  Alb  2.1<L>  /  TBili  0.4  /  DBili  x   /  AST  21  /  ALT  12  /  AlkPhos  99  09-24     Vital Signs Last 24 Hrs  T(C): 37 (24 Sep 2021 05:05), Max: 37 (23 Sep 2021 20:48)  T(F): 98.6 (24 Sep 2021 05:05), Max: 98.6 (23 Sep 2021 20:48)  HR: 78 (24 Sep 2021 05:05) (76 - 79)  BP: 102/47 (24 Sep 2021 05:05) (102/47 - 129/58)  BP(mean): --  RR: 18 (24 Sep 2021 05:05) (18 - 18)  SpO2: 95% (24 Sep 2021 05:05) (95% - 96%)  Sedimentation Rate, Erythrocyte: 77 mm/Hr (09-16-21 @ 16:03)  Sedimentation Rate, Erythrocyte: 91 mm/Hr (08-22-21 @ 15:41)         C-Reactive Protein, Serum: 45 mg/L (09-16-21 @ 23:33)  C-Reactive Protein, Serum: 85 mg/L (08-22-21 @ 21:30)  C-Reactive Protein, Serum: 67 mg/L (08-15-21 @ 11:55)   WBC Count: 4.86 K/uL (09-24-21 @ 06:15)    PHYSICAL EXAM  GEN: SHER ROBERT is a pleasant well-nourished, well developed 76y Male in no acute distress, alert awake, and oriented to person, place and time.   LE Focused:    Vasc:  DP/PT pulses were faintly palpable, bilateral. DP/PT pulses were monophasic on doppler, bilaterally. CFT<3 seconds to all digits.   Derm: Surgical with graft in place, granular wound bed through the graft, minimal drainage or discharge. minimal erythema , edema noted  Neuro: Protective sensation diminished, b/l  MSK: +5/5 muscle strength noted to the pedal compartments. 5th digit amputation on right foot, noted.     Imaging:  INTERPRETATION:  Postop, rule out infection.    3 views right foot. Prior 8/20/2021.     Status post resection of the fifth toe and distal fifth metatarsal unchanged in appearance. No focal bone lysis or unusual periosteal reaction to suggest osteomyelitis. No acute fracture or dislocation. The remainder study unchanged. No soft tissue gas.    IMPRESSION: No acute finding. No plain film evidence of osteomyelitis.        MRI R foot:     INTERPRETATION:  Clinical Information: Recent fifth metatarsal head resection now with fifth digit pain, swelling and foul discharge.    Comparison: Radiographs of the right foot from 9/16/2021 and MRI the right foot from 8/16/2021.    Technique:  MRI of the right midfoot and forefoot.  Intravenous Contrast: None.    Findings:    There is a resection at the mid aspect of the fifth metatarsal shaft. There is a lateral soft tissue wound beginning at the level of the amputation which extends distally. There is susceptibility artifact in the region of the amputation consistent with postoperative change. There is hyperintense T2 marrow signal throughout the remaining fifth metatarsal and within the adjacent fourth metatarsal head which is nonspecific and could be related to recent postoperative changes although osteomyelitis is suspected.    There is edema and mild atrophy within the plantar muscles of the foot. There is minimal spurring at the first metatarsophalangeal and hallux sesamoid articulations.    Impression:  Resection at the mid aspect of the fifth metatarsal. Lateral soft tissue wound with marrow signal abnormality throughout the fifth metatarsal and within the adjacent fourth metatarsal head which is nonspecific in the setting of recent surgery although osteomyelitis is suspected.      Culture Results:   Rare Aeromonas hydrophila/caviae   Few Klebsiella oxytoca/Raoutella ornithinolytica   Few Enterococcus faecalis   Few Streptococcus agalactiae (Group B) isolated   Group B streptococci are susceptible to ampicillin,   penicillin and cefazolin, but may be resistant to   erythromycin and clindamycin.   Recommendations for intrapartum prophylaxis for Group B   streptococci are penicillin or ampicillin. (09.16.21 @ 21:42)     Gram Stain:   No polymorphonuclear cells seen per low power field   No organisms seen per oil power field (09.22.21 @ 13:54)       Historical Values  Gram Stain:   No polymorphonuclear cells seen per low power field   No organisms seen per oil power field (09.22.21 @ 13:54)   Gram Stain:   No polymorphonuclear leukocytes seen per low power field   No organisms seen per oil power field (08.20.21 @ 03:59)       A:   s/p R 5th ray resection - 8/19  s/p R 5th wound debridement, graft application, bone biopsy and wound vac application 9/22     P:  Patient evaluated, chart reviewed  Explained all clinical findings to the patient and the daughter of the patient to their satisfaction  Answered all questions and concerns of the patient and the patient's daughter to their satisfaction   Xrays reviewed  ESR/CRP reviewed   MRI reviewed   Intra-op culture no growth as above  intra-op bone biopsy pending, will f/u  adaptic DSD applied, will re-apply wound vac later today  Continue local wound care to allow graft incorporation  Dressing change will be managed by podiatry for now  Appreciate ID consult   Podiatry will follow while in house  Discussed with Dr. Vazquez, examined with Dr. Lara

## 2021-09-24 NOTE — PROGRESS NOTE ADULT - ATTENDING COMMENTS
77yo M PMHx of severe aortic stenosis, CAD, Right Foot Wound presenting with right foot pain, found to have osteomyelitis s/p bone biopsy and debridement by podiatry. Patient tolerated procedure but on day of procedure had an OREN that has worsened over the last 2 day. Nephrology, Dr. Rodriguez consulted. Discussed case, suspect ATN secondary to infection, other work-up negative. Will monitor urine output and Cr, discussed with daughter possible need for dialysis if continues to worsen. Unclear if able to place a PICC line for prolonged IV abx if Cr does not improve, will discuss with renal. Continue on abx as per ID, adjusted for CrCl.

## 2021-09-24 NOTE — PROGRESS NOTE ADULT - PROBLEM SELECTOR PLAN 1
as a consequence of poorly controlled diabetes   continue with Unasyn, Levofloxacin --> renally dosed   afebrile,  no leukocytosis  s/p OR for wound debridement 9/22  local wound care as per podiatry with wound vac.  ESR, CRP elevated   cardio Dr. Wilkins consulted  ID: Dr. Barrett  pain management following   wound VAC and wound care as per podiatry

## 2021-09-24 NOTE — PROGRESS NOTE ADULT - SUBJECTIVE AND OBJECTIVE BOX
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 s/p OR debridement today. Nephrology consulted for abrupt rise in SCr.   s/p ibrahim placement for distended bladder on  with ~400ml of urine o/p    Hospital Medications: Medications reviewed.  REVIEW OF SYSTEMS:  CONSTITUTIONAL: No fevers or chills  RESPIRATORY: No shortness of breath  CARDIOVASCULAR: No chest pain.  GASTROINTESTINAL: No nausea, vomiting, diarrhea or abdominal pain.   VASCULAR: No bilateral lower extremity edema. Rt foot with mild pain    VITALS:  T(F): 99.1 (21 @ 20:23), Max: 99.1 (21 @ 12:56)  HR: 85 (21 @ 20:23)  BP: 123/54 (21 @ 20:23)  RR: 17 (21 @ 20:23)  SpO2: 95% (21 @ 12:56)  Wt(kg): --     @ 07:01  -   @ 07:00  --------------------------------------------------------  IN: 0 mL / OUT: 400 mL / NET: -400 mL     @ 07:01  -   @ 20:52  --------------------------------------------------------  IN: 100 mL / OUT: 200 mL / NET: -100 mL      PHYSICAL EXAM:  Gen: NAD, calm  HEENT: MMM  Neck: no JVD  Cards: RRR, +S1/S2, +TRINIDAD  Resp: CTA B/L  GI: soft, NT/ND, NABS  Extremities: no LE edema B/L  Derm: Rt foot wrapped/ wound vac    LABS:      141  |  109<H>  |  36<H>  ----------------------------<  101<H>  4.1   |  20<L>  |  3.97<H>    Ca    8.4      24 Sep 2021 06:15  Phos  3.6         TPro  6.1  /  Alb  2.1<L>  /  TBili  0.4  /  DBili      /  AST  21  /  ALT  12  /  AlkPhos  99      Creatinine Trend: 3.97 <--, 3.20 <--, 2.52 <--, 2.38 <--, 1.42 <--, 1.48 <--, 1.85 <--, 1.48 <--                        9.4    4.86  )-----------( 195      ( 24 Sep 2021 06:15 )             28.7     Urine Studies:  Urinalysis Basic - ( 22 Sep 2021 16:14 )    Color: Yellow / Appearance: Slightly Turbid / S.020 / pH:   Gluc:  / Ketone: Trace  / Bili: Negative / Urobili: Negative   Blood:  / Protein: 30 mg/dL / Nitrite: Negative   Leuk Esterase: Small / RBC: 0-2 /HPF / WBC 3-5 /HPF   Sq Epi:  / Non Sq Epi: Moderate /HPF / Bacteria: Moderate /HPF      Creatinine, Random Urine: 131 mg/dL ( @ 16:14)  Chloride, Random Urine: 52 mmol/L ( @ 16:14)  Sodium, Random Urine: 53 mmol/L ( @ 16:14)

## 2021-09-24 NOTE — DISCHARGE NOTE PROVIDER - HOSPITAL COURSE
76 year old male with past medical history of poorly controlled IDDM, HTN, HLD, CAD s/p CABG and recent right partial 5th ray amputation (8/19)/21) who presented to ED with c/o right foot pain associated with discharge and foul odor. He tried taking Tylenol for the pain with minimal relief. Of note, the patient never followed up with podiatry after his procedure in August. Patient admitted to medicine for foot pain and suspected osteomyelitis. MRI confirms suspected osteomyelitis - patient followed by podiatry and ID, patient undergo debridement and wound VAC placement. Patient currently on course of Unasyn and Levofloxacin, ID to follow up final surgical path for duration of antibiotic treatment. Patient with creatinine elevation of 3.97 this AM, s/p Molina placement on 09/23. Nephrology Dr. Rodriguez consulted.         ===================not completed===================   76 year old male with past medical history of poorly controlled IDDM, HTN, HLD, CAD s/p CABG and recent right partial 5th ray amputation (8/19)/21) who presented to ED with c/o right foot pain associated with discharge and foul odor. He tried taking Tylenol for the pain with minimal relief. Of note, the patient never followed up with podiatry after his procedure in August. Patient admitted to medicine for foot pain and suspected osteomyelitis. MRI confirms suspected osteomyelitis - patient followed by podiatry and ID, patient undergo debridement and wound VAC placement. Patient currently on course of Unasyn and Levofloxacin, ID to follow up final surgical path for duration of antibiotic treatment. Patient with creatinine elevation of 3.97 this AM, s/p Molina placement on 09/23. Nephrology Dr. Rodriguez consulted.   On 9/26 pt vomited after taking lactulose for constipation then Pt was desaturation on RA  thus NRB was placed. Also,  surgery consulted for possible bowel obstruction CT A/P showed ------------          ===================not completed===================   Patient is a 76 year old male with PMH of poorly controlled IDDM, HTN, HLD, stage III CKD, CAD s/p CABG and recent right partial 5th foot amputation (8/19/21) who presented to ED with c/o right foot pain associated with discharge and foul odor. Of note, the patient never followed up with podiatry after his procedure in August. MRI confirms suspected osteomyelitis - patient followed by podiatry and ID, patient undergo debridement and wound VAC placement. Patient currently on course of Unasyn and Levofloxacin, ID. Surgical pathology resulted OM, wound culture resulting Enterococcus Aeromonal and Klebsiella- treated initially with Zosyn however hospital course complicated with OREN likely mixed etiology with prerenal from active infection and pulmonary edema, intrarenal due to toxicity from IV antibiotics and post renal from urinary retention, Nephrology consulted for optimization of kidney function. IV antibiotic regimen switched to Unasyn and Levaquin, sensitive for organism, also found to have Candiduria treated with IV Fluconazole, will need 6 weeks of IV antibiotics, until 11/3.  Patient subsequently treated for Pulmonary Edema with IV lasix, Cardiology recommending SABIHA with L/R heart cath once infection clears and medically stable. Hospital course further complicated by abdominal distention and constipation for which he received lactulose and vomited. The patient had subsequent respiratory distress and it is suspected he aspirated as CXR showed possible RLL PNA. AXR shows distended loops of bowel for which NG tube was placed which the patient removed overnight 9/26 - re-attempts to replace NG tube were unsuccessful as patient was non-cooperative. Surgery consulted. CT abdomen deferred for now as patient is unstable and will not tolerate lying flat. ICU consulted given patient's deterioration in clinical status, and was not a candidate.     Please note that this a brief summary of hospital course please refer to daily progress notes and consult notes for full course and events. Patient seen and examined at bedside, discussed with medical attending. Patient medically cleared for discharge to [facility] for wound care management and IV antibiotic therapy. 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 Of note, the patient never followed up with podiatry after his procedure in August. MRI confirms suspected osteomyelitis - patient followed by podiatry and ID, patient undergo debridement and wound VAC placement. Patient currently on course of Unasyn and Levofloxacin, ID. Surgical pathology resulted OM, wound culture resulting Enterococcus Aeromonal and Klebsiella- treated initially with Zosyn however hospital course complicated with OREN likely mixed etiology with prerenal from active infection and pulmonary edema, intrarenal due to toxicity from IV antibiotics and post renal from urinary retention, Nephrology consulted for optimization of kidney function. IV antibiotic regimen switched to Unasyn and Levaquin, sensitive for organism, also found to have Candiduria treated with IV Fluconazole, will need 6 weeks of IV antibiotics, until 11/3.  Patient subsequently treated for Pulmonary Edema with IV lasix, Cardiology recommending SABIHA with L/R heart cath once infection clears and medically stable. Hospital course further complicated by abdominal distention and constipation for which he received lactulose and vomited. The patient had subsequent respiratory distress and it is suspected he aspirated as CXR showed possible RLL PNA. AXR shows distended loops of bowel for which NG tube was placed which the patient removed overnight 9/26 - re-attempts to replace NG tube were unsuccessful as patient was non-cooperative. Surgery consulted. CT abdomen deferred for now as patient is unstable and will not tolerate lying flat. ICU consulted given patient's deterioration in clinical status, and was not a candidate.      Patient is medically cleared for discharge to [facility] for wound care management and IV antibiotic therapy.      >>>>>>>>>>>>>>>>>>>>>>>>>>>>>>>>>>>>INCOMPLETE>>>>>>>>>>>>>>>>>>>>>>>>>>>>>>>>>>>>>>>>>>>>>>>>> Patient is a 76 year old male with PMH of poorly controlled IDDM, HTN, HLD, stage III CKD, CAD s/p CABG and recent right partial 5th foot amputation (8/19/21) who presented to ED with c/o right foot pain associated with discharge and foul odor. Of note, the patient never followed up with podiatry after his procedure in August. MRI confirms suspected osteomyelitis - patient followed by podiatry and ID, patient undergo debridement and wound VAC placement. Patient currently on course of Unasyn and Levofloxacin, ID. Surgical pathology resulted OM, wound culture resulting Enterococcus Aeromonal and Klebsiella- treated initially with Zosyn however hospital course complicated with OREN likely mixed etiology with prerenal from active infection and pulmonary edema, intrarenal due to toxicity from IV antibiotics and post renal from urinary retention, Nephrology consulted for optimization of kidney function. IV antibiotic regimen switched to Unasyn and Levaquin, sensitive for organism, also found to have Candiduria treated with IV Fluconazole, will need 6 weeks of IV antibiotics, until 11/3.  Patient subsequently treated for Pulmonary Edema with IV lasix, Cardiology recommending SABIHA with L/R heart cath once infection clears and medically stable. Hospital course further complicated by abdominal distention and constipation for which he received lactulose and vomited. The patient had subsequent respiratory distress and it is suspected he aspirated as CXR showed possible RLL PNA. AXR shows distended loops of bowel for which NG tube was placed which the patient removed overnight 9/26 - re-attempts to replace NG tube were unsuccessful as patient was non-cooperative. Surgery consulted. CT abdomen deferred for now as patient is unstable and will not tolerate lying flat. ICU consulted given patient's deterioration in clinical status.  Patient was transferred to ICU, mechanically intubated and extubated for AHRF secondary to acute on CHF. Right pigtail placed for pleural effusion, and removed when resolved by Thoracic surgery. Transferred to medicine floor. Patient however exposed to COVID, and being monitored for conversion. PT recommending [facility].  Pt is exposed to COVID on 10/23, will need to be quarantine until 11/1/21.  Repeat PCR on 10/30/2021 negative.       Patient is medically cleared for discharge to [facility] for wound care management and IV antibiotic therapy.      >>>>>>>>>>>>>>>>>>>>>>>>>>>>>>>>>>>>INCOMPLETE>>>>>>>>>>>>>>>>>>>>>>>>>>>>>>>>>>>>>>>>>>>>>>>>> Patient is a 76 year old male with PMH of poorly controlled IDDM, HTN, HLD, stage III CKD, CAD s/p CABG and recent right partial 5th foot amputation (8/19/21) who presented to ED with c/o right foot pain associated with discharge and foul odor. Of note, the patient never followed up with podiatry after his procedure in August. MRI confirms suspected osteomyelitis - patient followed by podiatry and ID, patient undergo debridement and wound VAC placement. Patient currently on course of Unasyn and Levofloxacin, ID. Surgical pathology resulted OM, wound culture resulting Enterococcus Aeromonal and Klebsiella- treated initially with Zosyn however hospital course complicated with OREN likely mixed etiology with prerenal from active infection and pulmonary edema, intrarenal due to toxicity from IV antibiotics and post renal from urinary retention, Nephrology consulted for optimization of kidney function. IV antibiotic regimen switched to Unasyn and Levaquin, sensitive for organism, also found to have Candiduria treated with IV Fluconazole, will need 6 weeks of IV antibiotics, until 11/3.  Patient subsequently treated for Pulmonary Edema with IV lasix, Cardiology recommending SABIHA with L/R heart cath once infection clears and medically stable. Hospital course further complicated by abdominal distention and constipation for which he received lactulose and vomited. The patient had subsequent respiratory distress and it is suspected he aspirated as CXR showed possible RLL PNA. AXR shows distended loops of bowel for which NG tube was placed which the patient removed overnight 9/26 - re-attempts to replace NG tube were unsuccessful as patient was non-cooperative. Surgery consulted. CT abdomen deferred for now as patient is unstable and will not tolerate lying flat. ICU consulted given patient's deterioration in clinical status.  Patient was transferred to ICU, mechanically intubated and extubated for AHRF secondary to acute on CHF. Right pigtail placed for pleural effusion, and removed when resolved by Thoracic surgery. Transferred to medicine floor. Patient however exposed to COVID, and being monitored for conversion. PT recommending [facility].  Pt is exposed to COVID on 10/23, will need to be quarantine until 11/1/21.  Repeat PCR on 10/30/2021 negative.      Patient is medically cleared for discharge to [facility] for wound care management and IV antibiotic therapy.    Discussed with attending.    Patient medically stable for discharge.    For full hospital course, please see medical record.

## 2021-09-24 NOTE — PROGRESS NOTE ADULT - SUBJECTIVE AND OBJECTIVE BOX
NP Note discussed with Primary Attending.    Patient is a 76y old  Male who presents with a chief complaint of R Foot Pain (24 Sep 2021 11:02)      INTERVAL HPI/OVERNIGHT EVENTS: no new complaints    MEDICATIONS  (STANDING):  acetaminophen   Tablet .. 1000 milliGRAM(s) Oral every 8 hours  ampicillin/sulbactam  IVPB 3 Gram(s) IV Intermittent every 12 hours  atorvastatin 80 milliGRAM(s) Oral at bedtime  bisacodyl Suppository 10 milliGRAM(s) Rectal once  cyanocobalamin 1000 MICROGram(s) Oral daily  ergocalciferol 89175 Unit(s) Oral <User Schedule>  ferrous    sulfate 325 milliGRAM(s) Oral daily  fluconAZOLE IVPB      fluconAZOLE IVPB 100 milliGRAM(s) IV Intermittent every 24 hours  heparin   Injectable 5000 Unit(s) SubCutaneous every 8 hours  influenza   Vaccine 0.5 milliLiter(s) IntraMuscular once  insulin lispro (ADMELOG) corrective regimen sliding scale   SubCutaneous Before meals and at bedtime  levoFLOXacin  Tablet 250 milliGRAM(s) Oral every 48 hours  metoprolol succinate  milliGRAM(s) Oral daily  NIFEdipine XL 90 milliGRAM(s) Oral daily  polyethylene glycol 3350 17 Gram(s) Oral daily  senna 2 Tablet(s) Oral at bedtime  tamsulosin 0.4 milliGRAM(s) Oral at bedtime    MEDICATIONS  (PRN):  HYDROmorphone  Injectable 0.5 milliGRAM(s) IV Push daily PRN Severe Pain (7 - 10)  ondansetron Injectable 4 milliGRAM(s) IV Push three times a day PRN Nausea and/or Vomiting  oxyCODONE    IR 5 milliGRAM(s) Oral every 4 hours PRN Moderate Pain (4 - 6)      __________________________________________________  REVIEW OF SYSTEMS:    CONSTITUTIONAL: No fever,   EYES: no acute visual disturbances  NECK: No pain or stiffness  RESPIRATORY: No cough; No shortness of breath  CARDIOVASCULAR: No chest pain, no palpitations  GASTROINTESTINAL: No pain. No nausea or vomiting; No diarrhea   NEUROLOGICAL: No headache or numbness, no tremors  MUSCULOSKELETAL: No joint pain, no muscle pain  GENITOURINARY: no dysuria, no frequency, no hesitancy  PSYCHIATRY: no depression , no anxiety  ALL OTHER  ROS negative        Vital Signs Last 24 Hrs  T(C): 37 (24 Sep 2021 05:05), Max: 37 (23 Sep 2021 20:48)  T(F): 98.6 (24 Sep 2021 05:05), Max: 98.6 (23 Sep 2021 20:48)  HR: 78 (24 Sep 2021 05:05) (76 - 79)  BP: 102/47 (24 Sep 2021 05:05) (102/47 - 129/58)  BP(mean): --  RR: 18 (24 Sep 2021 05:05) (18 - 18)  SpO2: 95% (24 Sep 2021 05:05) (95% - 96%)    ________________________________________________  PHYSICAL EXAM:  GENERAL: NAD  HEENT: Normocephalic;  conjunctivae and sclerae clear; moist mucous membranes;   NECK : supple  CHEST/LUNG: Clear to auscultation bilaterally with good air entry   HEART: S1 S2  regular; no murmurs, gallops or rubs  ABDOMEN: Soft, Nontender, Nondistended; Bowel sounds present  EXTREMITIES: no cyanosis; no edema; no calf tenderness  SKIN: warm and dry; no rash  NERVOUS SYSTEM:  Awake and alert; Oriented  to place, person and time ; no new deficits    _________________________________________________  LABS:                        9.4    4.86  )-----------( 195      ( 24 Sep 2021 06:15 )             28.7     09-24    141  |  109<H>  |  36<H>  ----------------------------<  101<H>  4.1   |  20<L>  |  3.97<H>    Ca    8.4      24 Sep 2021 06:15  Phos  3.6     09-23    TPro  6.1  /  Alb  2.1<L>  /  TBili  0.4  /  DBili  x   /  AST  21  /  ALT  12  /  AlkPhos  99  -      Urinalysis Basic - ( 22 Sep 2021 16:14 )    Color: Yellow / Appearance: Slightly Turbid / S.020 / pH: x  Gluc: x / Ketone: Trace  / Bili: Negative / Urobili: Negative   Blood: x / Protein: 30 mg/dL / Nitrite: Negative   Leuk Esterase: Small / RBC: 0-2 /HPF / WBC 3-5 /HPF   Sq Epi: x / Non Sq Epi: Moderate /HPF / Bacteria: Moderate /HPF      CAPILLARY BLOOD GLUCOSE      POCT Blood Glucose.: 143 mg/dL (24 Sep 2021 11:41)  POCT Blood Glucose.: 94 mg/dL (24 Sep 2021 08:17)  POCT Blood Glucose.: 126 mg/dL (23 Sep 2021 21:01)  POCT Blood Glucose.: 123 mg/dL (23 Sep 2021 16:45)        RADIOLOGY & ADDITIONAL TESTS:    EXAM:  US KIDNEYS AND BLADDER                            PROCEDURE DATE:  2021          INTERPRETATION:  CLINICAL INFORMATION: Acute kidney injury. Urinary retention.    COMPARISON: 2021    TECHNIQUE: Sonography of the kidneys and bladder.    FINDINGS:    Right kidney: 11.7 cm. No renal mass, hydronephrosis or calculi. The right kidney is hyperechoic.    Left kidney: 10.9 cm. No renal mass, hydronephrosis or calculi. The left kidney is hyperechoic.    Urinary bladder: The bladder hasa volume of 520 cc which is largely distended. The bladder is otherwise grossly unremarkable. The patient has no urge to void.    IMPRESSION:    No hydronephrosis.    Both kidneys are hyperechoic for which clinical correlation with intrinsic medical renal disease is recommended.    Largely distended urinary bladder.    --- End of Report ---    EXAM:  MR FOOT RT                            PROCEDURE DATE:  2021          INTERPRETATION:  Clinical Information: Recent fifth metatarsal head resection now with fifth digit pain, swelling and foul discharge.    Comparison: Radiographs of the right foot from 2021 and MRI the right foot from 2021.    Technique:  MRI of the right midfoot and forefoot.  Intravenous Contrast: None.    Findings:    There is a resection at the mid aspect of the fifth metatarsal shaft. There is a lateral soft tissue wound beginning at the level of the amputation which extends distally. There is susceptibility artifact in the region of the amputation consistent with postoperative change. There is hyperintense T2 marrow signal throughout the remaining fifth metatarsal and within the adjacent fourth metatarsal head which is nonspecific and could be related to recent postoperative changes although osteomyelitis is suspected.    There is edema and mild atrophy within the plantar muscles of the foot. There is minimal spurring at the first metatarsophalangeal and hallux sesamoid articulations.    Impression:  Resection at the mid aspect of the fifth metatarsal. Lateral soft tissue wound with marrow signal abnormality throughout the fifth metatarsal and within the adjacent fourth metatarsal head which is nonspecific in the setting of recent surgery although osteomyelitis is suspected.    --- End of Report ---      EXAM:  XR FOOT COMP MIN 3 VIEWS RT                            PROCEDURE DATE:  2021          INTERPRETATION:  Postop, rule out infection.    3 views right foot. Prior 2021.     Status post resection of the fifth toe and distal fifth metatarsal unchanged in appearance. No focal bone lysis or unusual periosteal reaction to suggest osteomyelitis. No acute fracture or dislocation. The remainder study unchanged. No soft tissue gas.    IMPRESSION: No acute finding. No plain film evidence of osteomyelitis.    --- End of Report ---            LYNDSEY WALLS MD; Attending Radiologist  This document has been electronically signed. Sep 16 2021  4:10PM    Imaging  Reviewed:  YES/NO    Consultant(s) Notes Reviewed:   YES/ No      Plan of care was discussed with patient and /or primary care giver; all questions and concerns were addressed

## 2021-09-24 NOTE — DISCHARGE NOTE PROVIDER - NSDCCPCAREPLAN_GEN_ALL_CORE_FT
PRINCIPAL DISCHARGE DIAGNOSIS  Diagnosis: Osteomyelitis of right foot, unspecified type  Assessment and Plan of Treatment:       SECONDARY DISCHARGE DIAGNOSES  Diagnosis: HTN (hypertension)  Assessment and Plan of Treatment: Follow up with your medical doctor to establish long term blood pressure treatment goals.      Diagnosis: Stage 3 chronic kidney disease  Assessment and Plan of Treatment: Your kidney function is elevated.  Avoid taking (NSAIDs) - (ex: Ibuprofen, Advil, Celebrex, Naprosyn)  Avoid taking any nephrotoxic agents (can harm kidneys) - Intravenous contrast for diagnostic testing, combination cold medications.  Have all medications adjusted for your renal function by your Health Care Provider.  Blood pressure control is important.  Take all medication as prescribed.      Diagnosis: DM (diabetes mellitus)  Assessment and Plan of Treatment: You have history of diabetes type 2. your HgA1C this admission is 11.2  Make sure you get your HgA1c checked every three months.  You are taking insulin, check your blood glucose before meals and at bedtime.  It's important not to skip any meals.  Keep a log of your blood glucose results and always take it with you to your doctor appointments.  Keep a list of your current medications including injectables and over the counter medications and bring this medication list with you to all your doctor appointments.  If you have not seen your ophthalmologist this year call for appointment.  Check your feet daily for redness, sores, or openings. Do not self treat. If no improvement in two days call your primary care physician for an appointment.  Low blood sugar (hypoglycemia) is a blood sugar below 70mg/dl. Check your blood sugar if you feel signs/symptoms of hypoglycemia. If your blood sugar is below 70 take 15 grams of carbohydrates (ex 4 oz of apple juice, 3-4 glucose tablets, or 4-6 oz of regular soda) wait 15 minutes and repeat blood sugar to make sure it comes up above 70.  If your blood sugar is above 70 and you are due for a meal, have a meal.  If you are not due for a meal have a snack.  This snack helps keeps your blood sugar at a safe range.       PRINCIPAL DISCHARGE DIAGNOSIS  Diagnosis: Osteomyelitis of right foot, unspecified type  Assessment and Plan of Treatment: You were admitted for infected diabetic foot ulcer with osteomyelitis. Followed by podiatrist and infectious disease doctor.   Take all of your antibiotics as ordered.  Wound vac placed by podiatry.   continue daily PT at rehab facility.   Call your Health Care Provider within two days of arriving home to make a follow up appointment within one week.  If the affected  area increases in redness, warmth, pain or swelling call your Health Care Provider.  If you develop fever, chills, and/or malaise, call your Health Care Provider.        SECONDARY DISCHARGE DIAGNOSES  Diagnosis: DM (diabetes mellitus)  Assessment and Plan of Treatment: You have history of diabetes type 2. your HgA1C this admission is 11.2  Make sure you get your HgA1c checked every three months.  You are taking insulin, check your blood glucose before meals and at bedtime.  It's important not to skip any meals.  Keep a log of your blood glucose results and always take it with you to your doctor appointments.  Keep a list of your current medications including injectables and over the counter medications and bring this medication list with you to all your doctor appointments.  If you have not seen your ophthalmologist this year call for appointment.  Check your feet daily for redness, sores, or openings. Do not self treat. If no improvement in two days call your primary care physician for an appointment.  Low blood sugar (hypoglycemia) is a blood sugar below 70mg/dl. Check your blood sugar if you feel signs/symptoms of hypoglycemia. If your blood sugar is below 70 take 15 grams of carbohydrates (ex 4 oz of apple juice, 3-4 glucose tablets, or 4-6 oz of regular soda) wait 15 minutes and repeat blood sugar to make sure it comes up above 70.  If your blood sugar is above 70 and you are due for a meal, have a meal.  If you are not due for a meal have a snack.  This snack helps keeps your blood sugar at a safe range.      Diagnosis: HTN (hypertension)  Assessment and Plan of Treatment: Low salt diet  Activity as tolerated.  Take all medication as prescribed.  Follow up with your medical doctor for routine blood pressure monitoring at your next visit.  Notify your doctor if you have any of the following symptoms:   Dizziness, Lightheadedness, Blurry vision, Headache, Chest pain, Shortness of breath      Diagnosis: Stage 3 chronic kidney disease  Assessment and Plan of Treatment: Your kidney function is elevated.  Avoid taking (NSAIDs) - (ex: Ibuprofen, Advil, Celebrex, Naprosyn)  Avoid taking any nephrotoxic agents (can harm kidneys) - Intravenous contrast for diagnostic testing, combination cold medications.  Have all medications adjusted for your renal function by your Health Care Provider.  Blood pressure control is important.  Take all medication as prescribed.       PRINCIPAL DISCHARGE DIAGNOSIS  Diagnosis: Osteomyelitis of right foot, unspecified type  Assessment and Plan of Treatment: You were admitted for infected diabetic foot ulcer with osteomyelitis. Followed by podiatrist and infectious disease doctor.   Take all of your antibiotics as ordered.  Wound vac placed by podiatry.   continue daily PT at rehab facility.   Call your Health Care Provider within two days of arriving home to make a follow up appointment within one week.  If the affected  area increases in redness, warmth, pain or swelling call your Health Care Provider.  If you develop fever, chills, and/or malaise, call your Health Care Provider.        SECONDARY DISCHARGE DIAGNOSES  Diagnosis: DM (diabetes mellitus)  Assessment and Plan of Treatment: You have history of diabetes type 2. your HgA1C this admission is 11.2  Make sure you get your HgA1c checked every three months.  You are taking insulin, check your blood glucose before meals and at bedtime.  It's important not to skip any meals.  Keep a log of your blood glucose results and always take it with you to your doctor appointments.  Keep a list of your current medications including injectables and over the counter medications and bring this medication list with you to all your doctor appointments.  If you have not seen your ophthalmologist this year call for appointment.  Check your feet daily for redness, sores, or openings. Do not self treat. If no improvement in two days call your primary care physician for an appointment.  Low blood sugar (hypoglycemia) is a blood sugar below 70mg/dl. Check your blood sugar if you feel signs/symptoms of hypoglycemia. If your blood sugar is below 70 take 15 grams of carbohydrates (ex 4 oz of apple juice, 3-4 glucose tablets, or 4-6 oz of regular soda) wait 15 minutes and repeat blood sugar to make sure it comes up above 70.  If your blood sugar is above 70 and you are due for a meal, have a meal.  If you are not due for a meal have a snack.  This snack helps keeps your blood sugar at a safe range.      Diagnosis: HTN (hypertension)  Assessment and Plan of Treatment: Low salt diet  Activity as tolerated.  Take all medication as prescribed.  Follow up with your medical doctor for routine blood pressure monitoring at your next visit.  Notify your doctor if you have any of the following symptoms:   Dizziness, Lightheadedness, Blurry vision, Headache, Chest pain, Shortness of breath      Diagnosis: Stage 3 chronic kidney disease  Assessment and Plan of Treatment: Your kidney function is elevated.  Avoid taking (NSAIDs) - (ex: Ibuprofen, Advil, Celebrex, Naprosyn)  Avoid taking any nephrotoxic agents (can harm kidneys) - Intravenous contrast for diagnostic testing, combination cold medications.  Have all medications adjusted for your renal function by your Health Care Provider.  Blood pressure control is important.  Take all medication as prescribed.      Diagnosis: Constipation  Assessment and Plan of Treatment: Continue with medications as prescribed.  Follow-up with your primary care physician.  Call your physician if you develop nausea/vomiting, abdominal pain not relieved with pain regimen, constipation not relieved with bowel  regimen.     PRINCIPAL DISCHARGE DIAGNOSIS  Diagnosis: Osteomyelitis of right foot, unspecified type  Assessment and Plan of Treatment: You were admitted for infected diabetic foot ulcer with osteomyelitis. Followed by podiatrist and infectious disease doctor.   Take all of your antibiotics as ordered.  Wound vac placed by podiatry.   continue daily PT at rehab facility.   Call your Health Care Provider within two days of arriving home to make a follow up appointment within one week.  If the affected  area increases in redness, warmth, pain or swelling call your Health Care Provider.  If you develop fever, chills, and/or malaise, call your Health Care Provider.  Continue Unsyn and Levafloxacin until 11/3  Please continue taking your medication as prescribed. If you have any questions or concerns about your medication please direct them to your prescribing Healthcare Provider.        SECONDARY DISCHARGE DIAGNOSES  Diagnosis: Candiduria  Assessment and Plan of Treatment: Please continue Fluconazole until 11/3.  Please continue taking your medication as prescribed. If you have any questions or concerns about your medication please direct them to your prescribing Healthcare Provider.  Please follow up with your Primary Care Physician in 1 week.    Diagnosis: Pulmonary edema  Assessment and Plan of Treatment: You were treated for fluid in the lungs with Lasix. Please repeat your chest x-ray in a 2 weeks for resolution of fluid. Please continue taking your medication as prescribed. If you have any questions or concerns about your medication please direct them to your prescribing Healthcare Provider.  Please follow up with your Cardiologist and Primary Care Physician in 1 week.    Diagnosis: CAD (coronary artery disease), autologous vein bypass graft  Assessment and Plan of Treatment: You are being recommended a SABIHA with L/R cardiac catheterization with possible TAVR provedure once you are fully treated for your current infection. Please continue taking your medication as prescribed. If you have any questions or concerns about your medication please direct them to your prescribing Healthcare Provider.      Diagnosis: DM (diabetes mellitus)  Assessment and Plan of Treatment: You have history of diabetes type 2. your HgA1C this admission is 11.2  Make sure you get your HgA1c checked every three months.  You are taking insulin, check your blood glucose before meals and at bedtime.  It's important not to skip any meals.  Keep a log of your blood glucose results and always take it with you to your doctor appointments.  Keep a list of your current medications including injectables and over the counter medications and bring this medication list with you to all your doctor appointments.  If you have not seen your ophthalmologist this year call for appointment.  Check your feet daily for redness, sores, or openings. Do not self treat. If no improvement in two days call your primary care physician for an appointment.  Low blood sugar (hypoglycemia) is a blood sugar below 70mg/dl. Check your blood sugar if you feel signs/symptoms of hypoglycemia. If your blood sugar is below 70 take 15 grams of carbohydrates (ex 4 oz of apple juice, 3-4 glucose tablets, or 4-6 oz of regular soda) wait 15 minutes and repeat blood sugar to make sure it comes up above 70.  If your blood sugar is above 70 and you are due for a meal, have a meal.  If you are not due for a meal have a snack.  This snack helps keeps your blood sugar at a safe range.      Diagnosis: HTN (hypertension)  Assessment and Plan of Treatment: Low salt diet  Activity as tolerated.  Take all medication as prescribed.  Follow up with your medical doctor for routine blood pressure monitoring at your next visit.  Notify your doctor if you have any of the following symptoms:   Dizziness, Lightheadedness, Blurry vision, Headache, Chest pain, Shortness of breath      Diagnosis: Stage 3 chronic kidney disease  Assessment and Plan of Treatment: Your kidney function is elevated.  Avoid taking (NSAIDs) - (ex: Ibuprofen, Advil, Celebrex, Naprosyn)  Avoid taking any nephrotoxic agents (can harm kidneys) - Intravenous contrast for diagnostic testing, combination cold medications.  Have all medications adjusted for your renal function by your Health Care Provider.  Blood pressure control is important.  Take all medication as prescribed.      Diagnosis: Constipation  Assessment and Plan of Treatment: Continue with medications as prescribed.  Follow-up with your primary care physician.  Call your physician if you develop nausea/vomiting, abdominal pain not relieved with pain regimen, constipation not relieved with bowel  regimen.

## 2021-09-24 NOTE — PROGRESS NOTE ADULT - PROBLEM SELECTOR PLAN 5
CAD s/p CABG, severe aortic stenosis --> patient will need TAVR, SABIHA after infectious workup is complete  continue ASA, lipitor, BB, ACEi  EF 55-60%, GIDD   EKG S and ST abnormality, consider lateral ischemia.  Cardio: Dr. Wilkins

## 2021-09-24 NOTE — PROGRESS NOTE ADULT - ASSESSMENT
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  As per daughter, patient was taking tylenol which did not help his pain prompting the patient to ask his daughter to take him to the hospital. ON admission, he has no fever or Leukocytosis. The Xray of Right foot shows no Osteomyelitis but MRI of Right foot shows Lateral soft tissue wound with marrow signal abnormality throughout the fifth metatarsal which is consistent of Osteomyelitis. He has seen by Podiatry and wound culture has sent, Zosyn and Vancomycin has started. The ID consult requested to assist with further evaluation and antibiotic management.     # Right Fifth metatarsal DFU with drainage and Osteomyelitis- wound cx grew Enterococcus, Aeromonas and Klebsiella - Zosyn is the ideal to cover All organisms but kidney function is worsening, hence change to Unasyn and Levaquin until kidney function is improved     would recommend:      1. Follow up pathology result for clear margin, if clear margin then then 7 days of oral abx, otherwise 6 weeks of IV ABx  2. Continue Adjusted doses Levaquin and  Unasyn based on crcl  3. Monitor Kidney function and adjust Abx doses accordingly  4. Wound care as per Podiatry  5. Pain management as needed    d/w patient and Nursing staff     Attending Attestation:    Spent more than 35 minutes on total encounter, more than 50 % of the visit was spent counseling and/or coordinating care by the Attending physician.   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  As per daughter, patient was taking tylenol which did not help his pain prompting the patient to ask his daughter to take him to the hospital. ON admission, he has no fever or Leukocytosis. The Xray of Right foot shows no Osteomyelitis but MRI of Right foot shows Lateral soft tissue wound with marrow signal abnormality throughout the fifth metatarsal which is consistent of Osteomyelitis. He has seen by Podiatry and wound culture has sent, Zosyn and Vancomycin has started. The ID consult requested to assist with further evaluation and antibiotic management.     # Right Fifth metatarsal DFU with drainage and Osteomyelitis- wound cx grew Enterococcus, Aeromonas and Klebsiella - Zosyn is the ideal to cover All organisms but kidney function is worsening, hence change to Unasyn and Levaquin until kidney function is improved     would recommend:    1. Follow up pathology result for clear margin, if clear margin then then 7 days of oral abx, otherwise 6 weeks of IV ABx  2. Continue Adjusted doses Levaquin and  Unasyn based on crcl  3. Monitor Kidney function and adjust Abx doses accordingly  4. Wound care as per Podiatry  5. IVF and taper off pain medications since become confused    d/w Nursing staff     Attending Attestation:    Spent more than 35 minutes on total encounter, more than 50 % of the visit was spent counseling and/or coordinating care by the Attending physician.

## 2021-09-24 NOTE — DISCHARGE NOTE PROVIDER - CARE PROVIDER_API CALL
Maeve Lara (DPM)  Surgery  [address] [place] [address]  Phone: [phone]  Fax: [phone]  Follow Up Time: 2 weeks   Maeve Lara (DPM)  Surgery  [address] [place] [address]  Phone: [phone]  Fax: [phone]  Follow Up Time: 2 weeks    Zak Wilkins)  Cardiovascular Disease  [address] [place] [address]  Phone: [phone]  Fax: [phone]  Follow Up Time: 1 week

## 2021-09-24 NOTE — PROGRESS NOTE ADULT - SUBJECTIVE AND OBJECTIVE BOX
C A R D I O L O G Y  **********************************     DATE OF SERVICE: 09-24-21    Patient denies chest pain or shortness of breath.   Review of symptoms otherwise negative.    acetaminophen   Tablet .. 1000 milliGRAM(s) Oral every 8 hours  ampicillin/sulbactam  IVPB 3 Gram(s) IV Intermittent every 12 hours  atorvastatin 80 milliGRAM(s) Oral at bedtime  bisacodyl Suppository 10 milliGRAM(s) Rectal once  cyanocobalamin 1000 MICROGram(s) Oral daily  ergocalciferol 13052 Unit(s) Oral <User Schedule>  ferrous    sulfate 325 milliGRAM(s) Oral daily  fluconAZOLE IVPB      fluconAZOLE IVPB 100 milliGRAM(s) IV Intermittent every 24 hours  heparin   Injectable 5000 Unit(s) SubCutaneous every 8 hours  HYDROmorphone  Injectable 0.5 milliGRAM(s) IV Push daily PRN  influenza   Vaccine 0.5 milliLiter(s) IntraMuscular once  insulin lispro (ADMELOG) corrective regimen sliding scale   SubCutaneous Before meals and at bedtime  levoFLOXacin  Tablet 250 milliGRAM(s) Oral every 48 hours  metoprolol succinate  milliGRAM(s) Oral daily  NIFEdipine XL 90 milliGRAM(s) Oral daily  ondansetron Injectable 4 milliGRAM(s) IV Push three times a day PRN  oxyCODONE    IR 5 milliGRAM(s) Oral every 4 hours PRN  polyethylene glycol 3350 17 Gram(s) Oral daily  senna 2 Tablet(s) Oral at bedtime  tamsulosin 0.4 milliGRAM(s) Oral at bedtime                            9.4    4.86  )-----------( 195      ( 24 Sep 2021 06:15 )             28.7       Hemoglobin: 9.4 g/dL (09-24 @ 06:15)  Hemoglobin: 9.3 g/dL (09-23 @ 06:28)  Hemoglobin: 9.9 g/dL (09-22 @ 06:36)  Hemoglobin: 10.1 g/dL (09-21 @ 06:22)  Hemoglobin: 10.4 g/dL (09-20 @ 07:07)      09-24    141  |  109<H>  |  36<H>  ----------------------------<  101<H>  4.1   |  20<L>  |  3.97<H>    Ca    8.4      24 Sep 2021 06:15  Phos  3.6     09-23    TPro  6.1  /  Alb  2.1<L>  /  TBili  0.4  /  DBili  x   /  AST  21  /  ALT  12  /  AlkPhos  99  09-24    Creatinine Trend: 3.97<--, 3.20<--, 2.52<--, 2.38<--, 1.42<--, 1.48<--    COAGS:           T(C): 37 (09-24-21 @ 05:05), Max: 37 (09-23-21 @ 20:48)  HR: 78 (09-24-21 @ 05:05) (76 - 79)  BP: 102/47 (09-24-21 @ 05:05) (102/47 - 129/58)  RR: 18 (09-24-21 @ 05:05) (18 - 18)  SpO2: 95% (09-24-21 @ 05:05) (95% - 96%)  Wt(kg): --    I&O's Summary    23 Sep 2021 07:01  -  24 Sep 2021 07:00  --------------------------------------------------------  IN: 0 mL / OUT: 400 mL / NET: -400 mL        Gen: Appears well in NAD  HEENT:  (-)icterus (-)pallor  CV: N S1 S2 1/6 TRINIDAD (+)2 Pulses B/l  Resp:  Clear to ausculatation B/L, normal effort  GI: (+) BS Soft, NT, ND  Lymph:  (-)Edema, (-)obvious lymphadenopathy  Skin: Warm to touch, Normal turgor  Psych: Appropriate mood and affect      ASSESSMENT/PLAN: 	76y  Male PMH DM on insulin, HTN, HLD, normal lV fx moderate to severe AS 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    - Awaiting EKG (reordered)  - Abx per primary team  - will need SABIHA for eval of AS as an outpt with His cardiologist at [facility]   - Progressive renal dysfunction no clinical CHF, renal f/u    Zak Wilkins MD, [facility]  [facility] [phone]

## 2021-09-24 NOTE — DISCHARGE NOTE PROVIDER - NSFOLLOWUPCLINICS_GEN_ALL_ED_FT
[facility]  Podiatry/Wound Care  [address] [place] [address]  Phone: [phone]  Fax: [phone]  Follow Up Time: 1 week

## 2021-09-24 NOTE — PROGRESS NOTE ADULT - SUBJECTIVE AND OBJECTIVE BOX
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 Pt with sharp pain on the R lateral foot. As per daughter, pt was taking tylenol which did not help his pain prompting the patient to ask his daughter to take him to the hospital. Pt is a poor historian. Daughter states that the patient did not see his podiatrist after the surgery. No fever, chest, pain, palpitations, nausea, vomiting, diarrhea (17 Sep 2021 01:11)    Pt is s/p 5th toe wound debridement and wound vac application on , POD #2. Being treated for OM. Pt with worsening OREN Cr 3.97. Pain consulted for foot pain. Pt seen and examined at bedside. Pt laying in bed reports right foot pain score 8/10. Pt reports oxycodone helps with the pain and decreases pain to a 5/10 and tolerable. Pt describes pain as throbbing, radiating throughout right foot, alleviated by pain medications, exacerbated by movement. Wound vac disconnected per podiatry this morning. Pt tolerating PO diet. Pt denies lethargy, nausea, vomiting, constipation and itchiness. Reports last BM . Patient stated goal for pain control: to be able to take deep breaths, get out of bed to chair with tolerable pain control.     PAST MEDICAL & SURGICAL HISTORY:  HTN (hypertension)    HLD (hyperlipidemia)    DM (diabetes mellitus)    CAD (coronary artery disease)    Glaucoma, angle-closure    S/P CABG (coronary artery bypass graft)    History of thyroid surgery        FAMILY HISTORY:  Family history of acute myocardial infarction (Father)    Family history of diabetes mellitus (Mother, Sibling)    Family history of hypertension (Mother, Sibling)    Family history of hyperlipidemia (Mother, Sibling)        Social History:  Lives at home  Denies alcohol intake, smoking, and illicit drug use (17 Sep 2021 01:11)    Allergies    No Known Allergies    MEDICATIONS  (STANDING):  acetaminophen   Tablet .. 1000 milliGRAM(s) Oral every 8 hours  ampicillin/sulbactam  IVPB 3 Gram(s) IV Intermittent every 12 hours  atorvastatin 80 milliGRAM(s) Oral at bedtime  bisacodyl Suppository 10 milliGRAM(s) Rectal once  cyanocobalamin 1000 MICROGram(s) Oral daily  ergocalciferol 91414 Unit(s) Oral <User Schedule>  ferrous    sulfate 325 milliGRAM(s) Oral daily  fluconAZOLE IVPB      fluconAZOLE IVPB 100 milliGRAM(s) IV Intermittent every 24 hours  heparin   Injectable 5000 Unit(s) SubCutaneous every 8 hours  influenza   Vaccine 0.5 milliLiter(s) IntraMuscular once  insulin lispro (ADMELOG) corrective regimen sliding scale   SubCutaneous Before meals and at bedtime  levoFLOXacin  Tablet 250 milliGRAM(s) Oral every 48 hours  metoprolol succinate  milliGRAM(s) Oral daily  NIFEdipine XL 90 milliGRAM(s) Oral daily  polyethylene glycol 3350 17 Gram(s) Oral daily  senna 2 Tablet(s) Oral at bedtime  tamsulosin 0.4 milliGRAM(s) Oral at bedtime    MEDICATIONS  (PRN):  HYDROmorphone  Injectable 0.5 milliGRAM(s) IV Push daily PRN Severe Pain (7 - 10)  ondansetron Injectable 4 milliGRAM(s) IV Push three times a day PRN Nausea and/or Vomiting  oxyCODONE    IR 5 milliGRAM(s) Oral every 4 hours PRN Moderate Pain (4 - 6)      Vital Signs Last 24 Hrs  T(C): 37 (24 Sep 2021 05:05), Max: 37 (23 Sep 2021 20:48)  T(F): 98.6 (24 Sep 2021 05:05), Max: 98.6 (23 Sep 2021 20:48)  HR: 78 (24 Sep 2021 05:05) (76 - 79)  BP: 102/47 (24 Sep 2021 05:05) (102/47 - 129/58)  BP(mean): --  RR: 18 (24 Sep 2021 05:05) (18 - 18)  SpO2: 95% (24 Sep 2021 05:05) (95% - 96%)  COVID-19 PCR: NotDetec (21 Sep 2021 10:54)  COVID-19 PCR: NotDetec (19 Sep 2021 15:05)  COVID-19 PCR: NotDetec (16 Sep 2021 22:24)  COVID-19 PCR: NotDetec (18 Aug 2021 18:23)  COVID-19 PCR: NotDetec (14 Aug 2021 09:41)    LABS: Reviewed                          9.4    4.86  )-----------( 195      ( 24 Sep 2021 06:15 )             28.7         141  |  109<H>  |  36<H>  ----------------------------<  101<H>  4.1   |  20<L>  |  3.97<H>    Ca    8.4      24 Sep 2021 06:15  Phos  3.6         TPro  6.1  /  Alb  2.1<L>  /  TBili  0.4  /  DBili  x   /  AST  21  /  ALT  12  /  AlkPhos  99        LIVER FUNCTIONS - ( 24 Sep 2021 06:15 )  Alb: 2.1 g/dL / Pro: 6.1 g/dL / ALK PHOS: 99 U/L / ALT: 12 U/L DA / AST: 21 U/L / GGT: x           Urinalysis Basic - ( 22 Sep 2021 16:14 )    Color: Yellow / Appearance: Slightly Turbid / S.020 / pH: x  Gluc: x / Ketone: Trace  / Bili: Negative / Urobili: Negative   Blood: x / Protein: 30 mg/dL / Nitrite: Negative   Leuk Esterase: Small / RBC: 0-2 /HPF / WBC 3-5 /HPF   Sq Epi: x / Non Sq Epi: Moderate /HPF / Bacteria: Moderate /HPF      CAPILLARY BLOOD GLUCOSE      POCT Blood Glucose.: 94 mg/dL (24 Sep 2021 08:17)  POCT Blood Glucose.: 126 mg/dL (23 Sep 2021 21:01)  POCT Blood Glucose.: 123 mg/dL (23 Sep 2021 16:45)  POCT Blood Glucose.: 127 mg/dL (23 Sep 2021 11:38)    COVID-19 PCR: NotDetec (21 Sep 2021 10:54)  COVID-19 PCR: NotDetec (19 Sep 2021 15:05)  COVID-19 PCR: NotDetec (16 Sep 2021 22:24)  COVID-19 PCR: NotDetec (18 Aug 2021 18:23)  COVID-19 PCR: NotDetec (14 Aug 2021 09:41)      Radiology: Reviewed  < from: MR Foot No Cont, Right (21 @ 13:04) >    EXAM:  MR FOOT RT                            PROCEDURE DATE:  2021          INTERPRETATION:  Clinical Information: Recent fifth metatarsal head resection now with fifth digit pain, swelling and foul discharge.    Comparison: Radiographs of the right foot from 2021 and MRI the right foot from 2021.    Technique:  MRI of the right midfoot and forefoot.  Intravenous Contrast: None.    Findings:    There is a resection at the mid aspect of the fifth metatarsal shaft. There is a lateral soft tissue wound beginning at the level of the amputation which extends distally. There is susceptibility artifact in the region of the amputation consistent with postoperative change. There is hyperintense T2 marrow signal throughout the remaining fifth metatarsal and within the adjacent fourth metatarsal head which is nonspecific and could be related to recent postoperative changes although osteomyelitis is suspected.    There is edema and mild atrophy within the plantar muscles of the foot. There is minimal spurring at the first metatarsophalangeal and hallux sesamoid articulations.    Impression:  Resection at the mid aspect of the fifth metatarsal. Lateral soft tissue wound with marrow signal abnormality throughout the fifth metatarsal and within the adjacent fourth metatarsal head which is nonspecific in the setting of recent surgery although osteomyelitis is suspected.    --- End of Report ---            AMIE ORTIZ MD; Attending Radiologist  This document has been electronically signed. Sep 17 2021  1:49PM    < end of copied text >    < from: US Kidney and Bladder (21 @ 10:08) >    EXAM:  US KIDNEYS AND BLADDER                            PROCEDURE DATE:  2021          INTERPRETATION:  CLINICAL INFORMATION: Acute kidney injury. Urinary retention.    COMPARISON: 2021    TECHNIQUE: Sonography of the kidneys and bladder.    FINDINGS:    Right kidney: 11.7 cm. No renal mass, hydronephrosis or calculi. The right kidney is hyperechoic.    Left kidney: 10.9 cm. No renal mass, hydronephrosis or calculi. The left kidney is hyperechoic.    Urinary bladder: The bladder hasa volume of 520 cc which is largely distended. The bladder is otherwise grossly unremarkable. The patient has no urge to void.    IMPRESSION:    No hydronephrosis.    Both kidneys are hyperechoic for which clinical correlation with intrinsic medical renal disease is recommended.    Largely distended urinary bladder.    --- End of Report ---            RENETTA PERKINS MD; Attending Radiologist  This document has been electronically signed. Sep 23 2021 10:40AM    < end of copied text >      ORT Score -   Family Hx of substance abuse	Female	      Male  Alcohol 	                                           1                     3  Illegal drugs	                                   2                     3  Rx drugs                                           4 	                  4  Personal Hx of substance abuse		  Alcohol 	                                          3	                  3  Illegal drugs                                     4	                  4  Rx drugs                                            5 	                  5  Age between 16- 45 years	           1                     1  hx preadolescent sexual abuse	   3 	                  0  Psychological disease		  ADD, OCD, bipolar, schizophrenia   2	          2  Depression                                           1 	          1  Total: 0    a score of 3 or lower indicates low risk for opioid abuse		  a score of 4-7 indicates moderate risk for opioid abuse		  a score of 8 or higher indicates high risk for opioid abuse    REVIEW OF SYSTEMS:  CONSTITUTIONAL: No fever + fatigue  HEENT:  + [place], no change in vision  NECK: No pain or stiffness  RESPIRATORY: No cough, wheezing, chills or hemoptysis; No shortness of breath  CARDIOVASCULAR: No chest pain, palpitations, dizziness, or leg swelling  GASTROINTESTINAL: No loss of appetite, decreased PO intake. No abdominal or epigastric pain. No nausea, vomiting; No diarrhea or constipation.   GENITOURINARY: + urinary retention.   MUSCULOSKELETAL: + right foot pain, no swelling; + generalized weakness; no falls   NEURO: No headaches, No numbness/tingling b/l LE  PSYCHIATRIC: No depression, anxiety or difficulty sleeping    PHYSICAL EXAM:  GENERAL:  Alert & Oriented X4, lethargic, cooperative, NAD, Speech is clear.   RESPIRATORY: Respirations even and unlabored. Clear to auscultation bilaterally; No rales, rhonchi, wheezing, or rubs  CARDIOVASCULAR: Normal S1/S2, regular rate and rhythm; + systolic murmur, No rubs, or gallops. No JVD.   GASTROINTESTINAL:  Soft, Nontender, + distended; Bowel sounds present  GENITOURINARY: Urinary catheter draining clear yellow urine.   PERIPHERAL VASCULAR: + right foot wound dressing c/d/i; Extremities warm without edema. 2+ radial Pulses, and 1+left pedal pulse, No cyanosis, No calf tenderness  MUSCULOSKELETAL: Motor Strength 4/5 B/L upper and 3/5 left lower extremities; + right LE + toe wiggle, right foot elevated on pillow; moves all extremities equally against gravity; ROM decreased b/l LE; + right  to palpation.   SKIN: + right foot wound     Risk factors associated with adverse outcomes related to opioid treatment  [ ]  Concurrent benzodiazepine use  [ ]  History/ Active substance use or alcohol use disorder  [ ] Psychiatric co-morbidity  [ ] Sleep apnea  [ ] COPD  [ ] BMI> 35  [ ] Liver dysfunction  [X ] Renal dysfunction  [ ] CHF  [ ] Smoker  [X ]  Age > 60 years    [X ]  [place]  Reviewed and Copied to Chart. See below.    Plan of care and goal oriented pain management treatment options were discussed with patient and /or primary care giver; all questions and concerns were addressed and care was aligned with patient's wishes.    Educated patient on goal oriented pain management treatment options     21 @ 11:06

## 2021-09-24 NOTE — PROGRESS NOTE ADULT - ASSESSMENT
Calculator page.   This informational tool is a resource and is not meant for direct clinical application.   Patient Search   Multi-Patient Search   MME Calculator   Reports   Drug List   Designation   My CORY #  Data Detail Level: Printer-Friendly View Extended View  Confidential Drug Utilization Report  Search Terms: alfred villagran, 1945Search Date: 09/18/2021 13:46:54 PM  The Drug Utilization Report below displays all of the controlled substance prescriptions, if any, that your patient has filled in the last twelve months. The information displayed on this report is compiled from pharmacy submissions to the Department, and accurately reflects the information as submitted by the pharmacies.    This report was requested by: Minh Slater | Reference #: [id number]    There are no results for the search terms that you entered.

## 2021-09-24 NOTE — PROGRESS NOTE ADULT - ASSESSMENT
76 year old male with past medical history of poorly controlled IDDM, HTN, HLD, CAD s/p CABG and recent right partial 5th ray amputation (8/19)/21) who presented to ED with c/o right foot pain associated with discharge and foul odor. He tried taking Tylenol for the pain with minimal relief. Of note, the patient never followed up with podiatry after his procedure in August. MRI confirms suspected osteomyelitis - patient followed by podiatry and ID, patient undergo debridement and wound VAC placement. Patient currently on course of Unasyn and Levofloxacin, ID to follow up final surgical path. Patient with creatinine elevation of 3.97 this AM, s/p Molina placement on 09/23. Nephrology Dr. Rodriguez consulted.

## 2021-09-24 NOTE — DISCHARGE NOTE PROVIDER - PROVIDER TOKENS
PROVIDER:[TOKEN:[[id number]],FOLLOWUP:[2 weeks]] PROVIDER:[TOKEN:[[id number]],FOLLOWUP:[2 weeks]],PROVIDER:[TOKEN:[[id number]],FOLLOWUP:[1 week]]

## 2021-09-24 NOTE — PROGRESS NOTE ADULT - PROBLEM SELECTOR PLAN 1
Pt with right foot pain due to 5th toe wound debridement and wound vac application on 9/22, POD #1. Being treated for OM. Pt with worsening OREN Cr 3.97.  High risk medications reviewed. Avoid polypharmacy. Avoid IV opioids only for dressing changes. Avoid NSAIDs and benzodiazepines. Non-pharmacological sleep aides initiated. Non-opioid medications and non-pharmacological pain management measures initiated.   nonopioid recc  - acetaminophen 1 gram po q 8 hours for 2 days  - Discontinued gabapentin 100mg po q 12 hours (pt with worsening OREN)  - no nsaids - pt with oren  opioid recc  - Continue dilaudid 0.5 mg daily prior to dressing changes  - oxycodone 5mg po q 4 hours prn moderate pain   bowel regimen  - senna and miralax

## 2021-09-25 LAB
ALBUMIN SERPL ELPH-MCNC: 2.3 G/DL — LOW (ref 3.5–5)
ALP SERPL-CCNC: 102 U/L — SIGNIFICANT CHANGE UP (ref 40–120)
ALT FLD-CCNC: 15 U/L DA — SIGNIFICANT CHANGE UP (ref 10–60)
ANION GAP SERPL CALC-SCNC: 11 MMOL/L — SIGNIFICANT CHANGE UP (ref 5–17)
AST SERPL-CCNC: 25 U/L — SIGNIFICANT CHANGE UP (ref 10–40)
BILIRUB SERPL-MCNC: 0.5 MG/DL — SIGNIFICANT CHANGE UP (ref 0.2–1.2)
BUN SERPL-MCNC: 41 MG/DL — HIGH (ref 7–18)
CALCIUM SERPL-MCNC: 8.6 MG/DL — SIGNIFICANT CHANGE UP (ref 8.4–10.5)
CHLORIDE SERPL-SCNC: 107 MMOL/L — SIGNIFICANT CHANGE UP (ref 96–108)
CO2 SERPL-SCNC: 21 MMOL/L — LOW (ref 22–31)
CREAT SERPL-MCNC: 4.05 MG/DL — HIGH (ref 0.5–1.3)
GLUCOSE BLDC GLUCOMTR-MCNC: 141 MG/DL — HIGH (ref 70–99)
GLUCOSE BLDC GLUCOMTR-MCNC: 159 MG/DL — HIGH (ref 70–99)
GLUCOSE BLDC GLUCOMTR-MCNC: 167 MG/DL — HIGH (ref 70–99)
GLUCOSE BLDC GLUCOMTR-MCNC: 186 MG/DL — HIGH (ref 70–99)
GLUCOSE SERPL-MCNC: 134 MG/DL — HIGH (ref 70–99)
HCT VFR BLD CALC: 28.3 % — LOW (ref 39–50)
HGB BLD-MCNC: 9.5 G/DL — LOW (ref 13–17)
MCHC RBC-ENTMCNC: 29.6 PG — SIGNIFICANT CHANGE UP (ref 27–34)
MCHC RBC-ENTMCNC: 33.6 GM/DL — SIGNIFICANT CHANGE UP (ref 32–36)
MCV RBC AUTO: 88.2 FL — SIGNIFICANT CHANGE UP (ref 80–100)
NRBC # BLD: 0 /100 WBCS — SIGNIFICANT CHANGE UP (ref 0–0)
PLATELET # BLD AUTO: 226 K/UL — SIGNIFICANT CHANGE UP (ref 150–400)
POTASSIUM SERPL-MCNC: 4.1 MMOL/L — SIGNIFICANT CHANGE UP (ref 3.5–5.3)
POTASSIUM SERPL-SCNC: 4.1 MMOL/L — SIGNIFICANT CHANGE UP (ref 3.5–5.3)
PROT SERPL-MCNC: 6.6 G/DL — SIGNIFICANT CHANGE UP (ref 6–8.3)
RBC # BLD: 3.21 M/UL — LOW (ref 4.2–5.8)
RBC # FLD: 13.2 % — SIGNIFICANT CHANGE UP (ref 10.3–14.5)
SODIUM SERPL-SCNC: 139 MMOL/L — SIGNIFICANT CHANGE UP (ref 135–145)
WBC # BLD: 6.96 K/UL — SIGNIFICANT CHANGE UP (ref 3.8–10.5)
WBC # FLD AUTO: 6.96 K/UL — SIGNIFICANT CHANGE UP (ref 3.8–10.5)

## 2021-09-25 PROCEDURE — 99231 SBSQ HOSP IP/OBS SF/LOW 25: CPT

## 2021-09-25 PROCEDURE — 99233 SBSQ HOSP IP/OBS HIGH 50: CPT

## 2021-09-25 RX ORDER — HYDROMORPHONE HYDROCHLORIDE 2 MG/ML
0.5 INJECTION INTRAMUSCULAR; INTRAVENOUS; SUBCUTANEOUS EVERY 4 HOURS
Refills: 0 | Status: DISCONTINUED | OUTPATIENT
Start: 2021-09-25 | End: 2021-09-25

## 2021-09-25 RX ORDER — SODIUM CHLORIDE 9 MG/ML
1000 INJECTION INTRAMUSCULAR; INTRAVENOUS; SUBCUTANEOUS
Refills: 0 | Status: DISCONTINUED | OUTPATIENT
Start: 2021-09-25 | End: 2021-09-26

## 2021-09-25 RX ORDER — ACETAMINOPHEN 500 MG
1000 TABLET ORAL ONCE
Refills: 0 | Status: COMPLETED | OUTPATIENT
Start: 2021-09-25 | End: 2021-09-25

## 2021-09-25 RX ORDER — ACETAMINOPHEN 500 MG
1000 TABLET ORAL EVERY 8 HOURS
Refills: 0 | Status: COMPLETED | OUTPATIENT
Start: 2021-09-25 | End: 2021-09-26

## 2021-09-25 RX ORDER — OXYCODONE HYDROCHLORIDE 5 MG/1
5 TABLET ORAL EVERY 4 HOURS
Refills: 0 | Status: DISCONTINUED | OUTPATIENT
Start: 2021-09-25 | End: 2021-09-27

## 2021-09-25 RX ORDER — SODIUM CHLORIDE 9 MG/ML
1000 INJECTION INTRAMUSCULAR; INTRAVENOUS; SUBCUTANEOUS
Refills: 0 | Status: DISCONTINUED | OUTPATIENT
Start: 2021-09-25 | End: 2021-09-25

## 2021-09-25 RX ORDER — ACETAMINOPHEN 500 MG
1000 TABLET ORAL ONCE
Refills: 0 | Status: DISCONTINUED | OUTPATIENT
Start: 2021-09-25 | End: 2021-09-25

## 2021-09-25 RX ORDER — NIFEDIPINE 30 MG
60 TABLET, EXTENDED RELEASE 24 HR ORAL DAILY
Refills: 0 | Status: DISCONTINUED | OUTPATIENT
Start: 2021-09-26 | End: 2021-10-07

## 2021-09-25 RX ORDER — HYDROMORPHONE HYDROCHLORIDE 2 MG/ML
0.5 INJECTION INTRAMUSCULAR; INTRAVENOUS; SUBCUTANEOUS DAILY
Refills: 0 | Status: DISCONTINUED | OUTPATIENT
Start: 2021-09-26 | End: 2021-09-27

## 2021-09-25 RX ADMIN — Medication 1000 MILLIGRAM(S): at 10:45

## 2021-09-25 RX ADMIN — Medication 1000 MILLIGRAM(S): at 19:01

## 2021-09-25 RX ADMIN — Medication 1: at 12:13

## 2021-09-25 RX ADMIN — SENNA PLUS 2 TABLET(S): 8.6 TABLET ORAL at 21:46

## 2021-09-25 RX ADMIN — Medication 1000 MILLIGRAM(S): at 18:05

## 2021-09-25 RX ADMIN — AMPICILLIN SODIUM AND SULBACTAM SODIUM 200 GRAM(S): 250; 125 INJECTION, POWDER, FOR SUSPENSION INTRAMUSCULAR; INTRAVENOUS at 18:05

## 2021-09-25 RX ADMIN — POLYETHYLENE GLYCOL 3350 17 GRAM(S): 17 POWDER, FOR SOLUTION ORAL at 12:13

## 2021-09-25 RX ADMIN — HEPARIN SODIUM 5000 UNIT(S): 5000 INJECTION INTRAVENOUS; SUBCUTANEOUS at 06:11

## 2021-09-25 RX ADMIN — FLUCONAZOLE 100 MILLIGRAM(S): 150 TABLET ORAL at 20:22

## 2021-09-25 RX ADMIN — ONDANSETRON 4 MILLIGRAM(S): 8 TABLET, FILM COATED ORAL at 08:31

## 2021-09-25 RX ADMIN — HEPARIN SODIUM 5000 UNIT(S): 5000 INJECTION INTRAVENOUS; SUBCUTANEOUS at 21:46

## 2021-09-25 RX ADMIN — SODIUM CHLORIDE 100 MILLILITER(S): 9 INJECTION INTRAMUSCULAR; INTRAVENOUS; SUBCUTANEOUS at 08:31

## 2021-09-25 RX ADMIN — Medication 325 MILLIGRAM(S): at 12:13

## 2021-09-25 RX ADMIN — Medication 1: at 21:45

## 2021-09-25 RX ADMIN — HYDROMORPHONE HYDROCHLORIDE 0.5 MILLIGRAM(S): 2 INJECTION INTRAMUSCULAR; INTRAVENOUS; SUBCUTANEOUS at 12:30

## 2021-09-25 RX ADMIN — Medication 400 MILLIGRAM(S): at 10:32

## 2021-09-25 RX ADMIN — Medication 90 MILLIGRAM(S): at 06:11

## 2021-09-25 RX ADMIN — HYDROMORPHONE HYDROCHLORIDE 0.5 MILLIGRAM(S): 2 INJECTION INTRAMUSCULAR; INTRAVENOUS; SUBCUTANEOUS at 08:37

## 2021-09-25 RX ADMIN — TAMSULOSIN HYDROCHLORIDE 0.4 MILLIGRAM(S): 0.4 CAPSULE ORAL at 21:46

## 2021-09-25 RX ADMIN — ATORVASTATIN CALCIUM 80 MILLIGRAM(S): 80 TABLET, FILM COATED ORAL at 21:46

## 2021-09-25 RX ADMIN — HEPARIN SODIUM 5000 UNIT(S): 5000 INJECTION INTRAVENOUS; SUBCUTANEOUS at 13:22

## 2021-09-25 RX ADMIN — HYDROMORPHONE HYDROCHLORIDE 0.5 MILLIGRAM(S): 2 INJECTION INTRAMUSCULAR; INTRAVENOUS; SUBCUTANEOUS at 08:50

## 2021-09-25 RX ADMIN — HYDROMORPHONE HYDROCHLORIDE 0.5 MILLIGRAM(S): 2 INJECTION INTRAMUSCULAR; INTRAVENOUS; SUBCUTANEOUS at 12:16

## 2021-09-25 RX ADMIN — AMPICILLIN SODIUM AND SULBACTAM SODIUM 200 GRAM(S): 250; 125 INJECTION, POWDER, FOR SUSPENSION INTRAMUSCULAR; INTRAVENOUS at 06:12

## 2021-09-25 RX ADMIN — PREGABALIN 1000 MICROGRAM(S): 225 CAPSULE ORAL at 12:13

## 2021-09-25 NOTE — PROGRESS NOTE ADULT - PROBLEM SELECTOR PLAN 1
Pt with right foot pain due to 5th toe wound debridement and wound vac application on 9/22, POD #1. Being treated for OM. Pt with worsening OREN Cr 4.05. PT with n/v 9/25 AM. NPO   High risk medications reviewed. Avoid polypharmacy. Avoid NSAIDs and benzodiazepines. Non-pharmacological sleep aides initiated. Non-opioid medications and non-pharmacological pain management measures initiated.   nonopioid recc  - acetaminophen 1 gram IV q 8 hours x 24hr only   - Discontinued gabapentin 100mg po q 12 hours on 9/24 (pt with worsening OREN)  - no nsaids - pt with oren  opioid recc  - Dilaudid 0.5 mg q 4 hours prn severe pain while NPO. Then transition back to oxycodone 5mg PO q4h PRN   bowel regimen  - senna and miralax

## 2021-09-25 NOTE — PROGRESS NOTE ADULT - SUBJECTIVE AND OBJECTIVE BOX
S: Patient had profuse NBNB emesis this morning. Denies acute complaints. Reports no constipation. Increased urine output over last 24 hours.    O:  Vital Signs Last 24 Hrs  T(C): 36.6 (25 Sep 2021 12:54), Max: 37.3 (24 Sep 2021 20:23)  T(F): 97.9 (25 Sep 2021 12:54), Max: 99.1 (24 Sep 2021 20:23)  HR: 85 (25 Sep 2021 12:54) (85 - 88)  BP: 103/53 (25 Sep 2021 12:54) (103/53 - 123/54)  BP(mean): --  RR: 18 (25 Sep 2021 12:54) (17 - 18)  SpO2: 95% (25 Sep 2021 12:54) (95% - 96%)    GENERAL: NAD, well-developed  HEAD:  Atraumatic, Normocephalic  EYES: EOMI, PERRLA, conjunctiva and sclera clear  NECK: Supple, No JVD  CHEST/LUNG: Clear to auscultation bilaterally; No wheeze  HEART: Regular rate and rhythm; Systolic ejetion murmur  ABDOMEN: Soft, Nontender, mildly distended; Bowel sounds present  EXTREMITIES:  2+ Peripheral Pulses, No clubbing, cyanosis, or edema  PSYCH: AAOx3  NEUROLOGY: non-focal  SKIN: No rashes or lesions    acetaminophen  IVPB .. 1000 milliGRAM(s) IV Intermittent once  ampicillin/sulbactam  IVPB 3 Gram(s) IV Intermittent every 12 hours  atorvastatin 80 milliGRAM(s) Oral at bedtime  bisacodyl Suppository 10 milliGRAM(s) Rectal once  cyanocobalamin 1000 MICROGram(s) Oral daily  ergocalciferol 16591 Unit(s) Oral <User Schedule>  ferrous    sulfate 325 milliGRAM(s) Oral daily  fluconAZOLE IVPB      fluconAZOLE IVPB 100 milliGRAM(s) IV Intermittent every 24 hours  heparin   Injectable 5000 Unit(s) SubCutaneous every 8 hours  HYDROmorphone  Injectable 0.5 milliGRAM(s) IV Push every 4 hours PRN  influenza   Vaccine 0.5 milliLiter(s) IntraMuscular once  insulin lispro (ADMELOG) corrective regimen sliding scale   SubCutaneous Before meals and at bedtime  levoFLOXacin  Tablet 250 milliGRAM(s) Oral every 48 hours  metoprolol succinate  milliGRAM(s) Oral daily  NIFEdipine XL 90 milliGRAM(s) Oral daily  ondansetron Injectable 4 milliGRAM(s) IV Push three times a day PRN  polyethylene glycol 3350 17 Gram(s) Oral daily  senna 2 Tablet(s) Oral at bedtime  sodium chloride 0.9%. 1000 milliLiter(s) IV Continuous <Continuous>  tamsulosin 0.4 milliGRAM(s) Oral at bedtime                            9.5    6.96  )-----------( 226      ( 25 Sep 2021 06:40 )             28.3       09-25    139  |  107  |  41<H>  ----------------------------<  134<H>  4.1   |  21<L>  |  4.05<H>    Ca    8.6      25 Sep 2021 06:40    TPro  6.6  /  Alb  2.3<L>  /  TBili  0.5  /  DBili  x   /  AST  25  /  ALT  15  /  AlkPhos  102  09-25

## 2021-09-25 NOTE — PROGRESS NOTE ADULT - PROBLEM SELECTOR PLAN 5
CAD s/p CABG, severe aortic stenosis --> patient will need TAVR, SABIHA after infectious workup is complete  continue ASA, lipitor, BB, ACEi  EF 55-60%, GIDD   EKG S and ST abnormality, consider lateral ischemia.  Cardio: Dr. Wilkins  Family does not want to see cardiologist in [place] anymore due to distace, would prefer someone in [place], patient can possibly follow-up with Dr. Wilkins after discharge

## 2021-09-25 NOTE — PROGRESS NOTE ADULT - SUBJECTIVE AND OBJECTIVE BOX
Patient is seen and examined at the bed side, is afebrile.  The kidney function is worsening, its multifactorial including urinary retention and ACEi use and infection, The Neurology recommendation appreciated.        REVIEW OF SYSTEMS: All other review systems are negative      ALLERGIES: No Known Allergies      Vital Signs Last 24 Hrs  T(C): 36.6 (25 Sep 2021 12:54), Max: 37.3 (24 Sep 2021 20:23)  T(F): 97.9 (25 Sep 2021 12:54), Max: 99.1 (24 Sep 2021 20:23)  HR: 85 (25 Sep 2021 12:54) (85 - 88)  BP: 103/53 (25 Sep 2021 12:54) (103/53 - 123/54)  BP(mean): --  RR: 18 (25 Sep 2021 12:54) (17 - 18)  SpO2: 95% (25 Sep 2021 12:54) (95% - 96%)      PHYSICAL EXAM:  GENERAL: Not in distress   CHEST/LUNG:  Not using accessory muscles  HEART: s1 and s2 present  ABDOMEN:  Nontender and  Nondistended  EXTREMITIES: Right foot bandage in placed   CNS: Awake and Alert      LABS:                         9.5    6.96  )-----------( 226      ( 25 Sep 2021 06:40 )             28.3                                   9.4    4.86  )-----------( 195      ( 24 Sep 2021 06:15 )             28.7            09-25    139  |  107  |  41<H>  ----------------------------<  134<H>  4.1   |  21<L>  |  4.05<H>    Ca    8.6      25 Sep 2021 06:40    TPro  6.6  /  Alb  2.3<L>  /  TBili  0.5  /  DBili  x   /  AST  25  /  ALT  15  /  AlkPhos  102  09-25 09-24    141  |  109<H>  |  36<H>  ----------------------------<  101<H>  4.1   |  20<L>  |  3.97<H>    Ca    8.4      24 Sep 2021 06:15  Phos  3.6     09-23    TPro  6.1  /  Alb  2.1<L>  /  TBili  0.4  /  DBili  x   /  AST  21  /  ALT  12  /  AlkPhos  99  09-24 09-19    137  |  107  |  17  ----------------------------<  82  4.1   |  22  |  1.85<H>    Ca    9.1      19 Sep 2021 06:28    TPro  7.2  /  Alb  2.7<L>  /  TBili  0.6  /  DBili  x   /  AST  10  /  ALT  13  /  AlkPhos  97  09-19        CAPILLARY BLOOD GLUCOSE  POCT Blood Glucose.: 134 mg/dL (17 Sep 2021 17:11)  POCT Blood Glucose.: 128 mg/dL (17 Sep 2021 11:06)  POCT Blood Glucose.: 157 mg/dL (17 Sep 2021 04:10)      MEDICATIONS  (STANDING):    acetaminophen   Tablet .. 1000 milliGRAM(s) Oral every 8 hours  ampicillin/sulbactam  IVPB 3 Gram(s) IV Intermittent every 12 hours  atorvastatin 80 milliGRAM(s) Oral at bedtime  bisacodyl Suppository 10 milliGRAM(s) Rectal once  cyanocobalamin 1000 MICROGram(s) Oral daily  ergocalciferol 86701 Unit(s) Oral <User Schedule>  ferrous    sulfate 325 milliGRAM(s) Oral daily  fluconAZOLE IVPB      fluconAZOLE IVPB 100 milliGRAM(s) IV Intermittent every 24 hours  heparin   Injectable 5000 Unit(s) SubCutaneous every 8 hours  influenza   Vaccine 0.5 milliLiter(s) IntraMuscular once  insulin lispro (ADMELOG) corrective regimen sliding scale   SubCutaneous Before meals and at bedtime  levoFLOXacin  Tablet 250 milliGRAM(s) Oral every 48 hours  metoprolol succinate  milliGRAM(s) Oral daily  polyethylene glycol 3350 17 Gram(s) Oral daily  senna 2 Tablet(s) Oral at bedtime  sodium chloride 0.9%. 1000 milliLiter(s) (100 mL/Hr) IV Continuous <Continuous>  tamsulosin 0.4 milliGRAM(s) Oral at bedtime      RADIOLOGY & ADDITIONAL TESTS:    9/17/21 : MR Foot No Cont, Right (09.17.21 @ 13:04) : Resection at the mid aspect of the fifth metatarsal. Lateral soft tissue wound with marrow signal abnormality throughout the fifth metatarsal and within the adjacent fourth metatarsal head which is nonspecific in the setting of recent surgery although osteomyelitis is suspected.    9/16/21 : Xray Foot AP + Lateral + Oblique, Right (09.16.21 @ 15:03) : No acute finding. No plain film evidence of osteomyelitis.        MICROBIOLOGY DATA:      Urine Microscopic-Add On (NC) (09.22.21 @ 16:14)   Red Blood Cell - Urine: 0-2 /HPF   White Blood Cell - Urine: 3-5 /HPF   Bacteria: Moderate /HPF   Comment - Urine: yeast cells present   Epithelial Cells: Moderate /HPF     Culture - Tissue with Gram Stain (09.22.21 @ 13:54)   Gram Stain: No polymorphonuclear cells seen per low power field   No organisms seen per oil power field   Specimen Source: .Tissue Right 5th toe r/o osteomyeltis   Culture Results: No growth     COVID-19 David Domain Antibody (09.18.21 @ 12:55)   COVID-19 David Domain Antibody Result: >250.00:    Culture - Blood (09.17.21 @ 10:28)   Specimen Source: .Blood Blood-Peripheral   Culture Results: No growth to date.     Culture - Blood (09.17.21 @ 10:28)   Specimen Source: .Blood Blood-Venous   Culture Results: No growth to date.     Culture - Surgical Swab (09.16.21 @ 21:42)   - Amikacin: S <=16   - Amikacin: S <=16   - Amoxicillin/Clavulanic Acid: S <=8/4   - Ampicillin: R >16 These ampicillin results predict results for amoxicillin   - Ampicillin: S <=2 Predicts results to ampicillin/sulbactam, amoxacillin-clavulanate and piperacillin-tazobactam.   - Ampicillin/Sulbactam: S 8/4 Enterobacter, Citrobacter, and Serratia may develop resistance during prolonged therapy (3-4 days)   - Ampicillin/Sulbactam: R >16/8   - Aztreonam: S <=4   - Aztreonam: S <=4   - Cefazolin: R 16 Enterobacter, Citrobacter, and Serratia may develop resistance during prolonged therapy (3-4 days)   - Cefazolin: R >16   - Cefepime: S <=2   - Cefepime: S <=2   - Cefoxitin: S <=8   - Cefoxitin: S <=8   - Ceftazidime: S <=1   - Ceftriaxone: S <=1   - Ceftriaxone: S <=1 Enterobacter, Citrobacter, and Serratia may develop resistance during prolonged therapy   - Ciprofloxacin: S <=0.25   - Ciprofloxacin: S <=0.25   - Ertapenem: S <=0.5   - Gentamicin: S <=2   - Gentamicin: S <=2   - Imipenem: S <=1   - Levofloxacin: S <=0.5   - Levofloxacin: S <=0.5   - Meropenem: S <=1   - Meropenem: S <=1   - Piperacillin/Tazobactam: S <=8   - Piperacillin/Tazobactam: S <=8   - Tetra/Doxy: S 4   - Tobramycin: S <=2   - Trimethoprim/Sulfamethoxazole: S <=0.5/9.5   - Trimethoprim/Sulfamethoxazole: S <=0.5/9.5   - Vancomycin: S 2   Specimen Source: .Surgical Swab right foot wound   Culture Results:   Culture yields >4 types of aerobic and/or anaerobic bacteria   Call client services within 7 days if further workup is clinically   indicated. Culture includes   Rare Aeromonas hydrophila/caviae   Few Klebsiella oxytoca/Raoutella ornithinolytica   Few Enterococcus faecalis   Few Streptococcus agalactiae (Group B) isolated   Group B streptococci are susceptible to ampicillin,   penicillin and cefazolin, but may be resistant to   erythromycin and clindamycin.   Recommendations for intrapartum prophylaxis for Group B   streptococci are penicillin or ampicillin.   Organism Identification: Aeromonas hydrophila/caviae   Klebsiella oxytoca /Raoutella ornithinolytica   Enterococcus faecalis   Organism: Aeromonas hydrophila/caviae   Organism: Klebsiella oxytoca /Raoutella ornithinolytica   Organism: Enterococcus faecalis   COVID-19 PCR . (09.16.21 @ 22:24)   COVID-19 PCR: NotDetec:           Patient is seen and examined at the bed side, is afebrile.  The kidney function is worsening, its multifactorial including urinary retention and ACEi use and infection, The Nephrology recommendation appreciated.        REVIEW OF SYSTEMS: All other review systems are negative      ALLERGIES: No Known Allergies      Vital Signs Last 24 Hrs  T(C): 36.6 (25 Sep 2021 12:54), Max: 37.3 (24 Sep 2021 20:23)  T(F): 97.9 (25 Sep 2021 12:54), Max: 99.1 (24 Sep 2021 20:23)  HR: 85 (25 Sep 2021 12:54) (85 - 88)  BP: 103/53 (25 Sep 2021 12:54) (103/53 - 123/54)  BP(mean): --  RR: 18 (25 Sep 2021 12:54) (17 - 18)  SpO2: 95% (25 Sep 2021 12:54) (95% - 96%)      PHYSICAL EXAM:  GENERAL: Not in distress   CHEST/LUNG:  Not using accessory muscles  HEART: s1 and s2 present  ABDOMEN:  Nontender and  Nondistended  EXTREMITIES: Right foot bandage in placed   CNS: Awake and Alert      LABS:                         9.5    6.96  )-----------( 226      ( 25 Sep 2021 06:40 )             28.3                                   9.4    4.86  )-----------( 195      ( 24 Sep 2021 06:15 )             28.7            09-25    139  |  107  |  41<H>  ----------------------------<  134<H>  4.1   |  21<L>  |  4.05<H>    Ca    8.6      25 Sep 2021 06:40    TPro  6.6  /  Alb  2.3<L>  /  TBili  0.5  /  DBili  x   /  AST  25  /  ALT  15  /  AlkPhos  102  09-25 09-24    141  |  109<H>  |  36<H>  ----------------------------<  101<H>  4.1   |  20<L>  |  3.97<H>    Ca    8.4      24 Sep 2021 06:15  Phos  3.6     09-23    TPro  6.1  /  Alb  2.1<L>  /  TBili  0.4  /  DBili  x   /  AST  21  /  ALT  12  /  AlkPhos  99  09-24 09-19    137  |  107  |  17  ----------------------------<  82  4.1   |  22  |  1.85<H>    Ca    9.1      19 Sep 2021 06:28    TPro  7.2  /  Alb  2.7<L>  /  TBili  0.6  /  DBili  x   /  AST  10  /  ALT  13  /  AlkPhos  97  09-19        CAPILLARY BLOOD GLUCOSE  POCT Blood Glucose.: 134 mg/dL (17 Sep 2021 17:11)  POCT Blood Glucose.: 128 mg/dL (17 Sep 2021 11:06)  POCT Blood Glucose.: 157 mg/dL (17 Sep 2021 04:10)      MEDICATIONS  (STANDING):    acetaminophen   Tablet .. 1000 milliGRAM(s) Oral every 8 hours  ampicillin/sulbactam  IVPB 3 Gram(s) IV Intermittent every 12 hours  atorvastatin 80 milliGRAM(s) Oral at bedtime  bisacodyl Suppository 10 milliGRAM(s) Rectal once  cyanocobalamin 1000 MICROGram(s) Oral daily  ergocalciferol 68817 Unit(s) Oral <User Schedule>  ferrous    sulfate 325 milliGRAM(s) Oral daily  fluconAZOLE IVPB      fluconAZOLE IVPB 100 milliGRAM(s) IV Intermittent every 24 hours  heparin   Injectable 5000 Unit(s) SubCutaneous every 8 hours  influenza   Vaccine 0.5 milliLiter(s) IntraMuscular once  insulin lispro (ADMELOG) corrective regimen sliding scale   SubCutaneous Before meals and at bedtime  levoFLOXacin  Tablet 250 milliGRAM(s) Oral every 48 hours  metoprolol succinate  milliGRAM(s) Oral daily  polyethylene glycol 3350 17 Gram(s) Oral daily  senna 2 Tablet(s) Oral at bedtime  sodium chloride 0.9%. 1000 milliLiter(s) (100 mL/Hr) IV Continuous <Continuous>  tamsulosin 0.4 milliGRAM(s) Oral at bedtime      RADIOLOGY & ADDITIONAL TESTS:    9/17/21 : MR Foot No Cont, Right (09.17.21 @ 13:04) : Resection at the mid aspect of the fifth metatarsal. Lateral soft tissue wound with marrow signal abnormality throughout the fifth metatarsal and within the adjacent fourth metatarsal head which is nonspecific in the setting of recent surgery although osteomyelitis is suspected.    9/16/21 : Xray Foot AP + Lateral + Oblique, Right (09.16.21 @ 15:03) : No acute finding. No plain film evidence of osteomyelitis.        MICROBIOLOGY DATA:      Urine Microscopic-Add On (NC) (09.22.21 @ 16:14)   Red Blood Cell - Urine: 0-2 /HPF   White Blood Cell - Urine: 3-5 /HPF   Bacteria: Moderate /HPF   Comment - Urine: yeast cells present   Epithelial Cells: Moderate /HPF     Culture - Tissue with Gram Stain (09.22.21 @ 13:54)   Gram Stain: No polymorphonuclear cells seen per low power field   No organisms seen per oil power field   Specimen Source: .Tissue Right 5th toe r/o osteomyeltis   Culture Results: No growth     COVID-19 David Domain Antibody (09.18.21 @ 12:55)   COVID-19 David Domain Antibody Result: >250.00:    Culture - Blood (09.17.21 @ 10:28)   Specimen Source: .Blood Blood-Peripheral   Culture Results: No growth to date.     Culture - Blood (09.17.21 @ 10:28)   Specimen Source: .Blood Blood-Venous   Culture Results: No growth to date.     Culture - Surgical Swab (09.16.21 @ 21:42)   - Amikacin: S <=16   - Amikacin: S <=16   - Amoxicillin/Clavulanic Acid: S <=8/4   - Ampicillin: R >16 These ampicillin results predict results for amoxicillin   - Ampicillin: S <=2 Predicts results to ampicillin/sulbactam, amoxacillin-clavulanate and piperacillin-tazobactam.   - Ampicillin/Sulbactam: S 8/4 Enterobacter, Citrobacter, and Serratia may develop resistance during prolonged therapy (3-4 days)   - Ampicillin/Sulbactam: R >16/8   - Aztreonam: S <=4   - Aztreonam: S <=4   - Cefazolin: R 16 Enterobacter, Citrobacter, and Serratia may develop resistance during prolonged therapy (3-4 days)   - Cefazolin: R >16   - Cefepime: S <=2   - Cefepime: S <=2   - Cefoxitin: S <=8   - Cefoxitin: S <=8   - Ceftazidime: S <=1   - Ceftriaxone: S <=1   - Ceftriaxone: S <=1 Enterobacter, Citrobacter, and Serratia may develop resistance during prolonged therapy   - Ciprofloxacin: S <=0.25   - Ciprofloxacin: S <=0.25   - Ertapenem: S <=0.5   - Gentamicin: S <=2   - Gentamicin: S <=2   - Imipenem: S <=1   - Levofloxacin: S <=0.5   - Levofloxacin: S <=0.5   - Meropenem: S <=1   - Meropenem: S <=1   - Piperacillin/Tazobactam: S <=8   - Piperacillin/Tazobactam: S <=8   - Tetra/Doxy: S 4   - Tobramycin: S <=2   - Trimethoprim/Sulfamethoxazole: S <=0.5/9.5   - Trimethoprim/Sulfamethoxazole: S <=0.5/9.5   - Vancomycin: S 2   Specimen Source: .Surgical Swab right foot wound   Culture Results:   Culture yields >4 types of aerobic and/or anaerobic bacteria   Call client services within 7 days if further workup is clinically   indicated. Culture includes   Rare Aeromonas hydrophila/caviae   Few Klebsiella oxytoca/Raoutella ornithinolytica   Few Enterococcus faecalis   Few Streptococcus agalactiae (Group B) isolated   Group B streptococci are susceptible to ampicillin,   penicillin and cefazolin, but may be resistant to   erythromycin and clindamycin.   Recommendations for intrapartum prophylaxis for Group B   streptococci are penicillin or ampicillin.   Organism Identification: Aeromonas hydrophila/caviae   Klebsiella oxytoca /Raoutella ornithinolytica   Enterococcus faecalis   Organism: Aeromonas hydrophila/caviae   Organism: Klebsiella oxytoca /Raoutella ornithinolytica   Organism: Enterococcus faecalis   COVID-19 PCR . (09.16.21 @ 22:24)   COVID-19 PCR: NotDetec:

## 2021-09-25 NOTE — PROGRESS NOTE ADULT - ASSESSMENT
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  As per daughter, patient was taking tylenol which did not help his pain prompting the patient to ask his daughter to take him to the hospital. ON admission, he has no fever or Leukocytosis. The Xray of Right foot shows no Osteomyelitis but MRI of Right foot shows Lateral soft tissue wound with marrow signal abnormality throughout the fifth metatarsal which is consistent of Osteomyelitis. He has seen by Podiatry and wound culture has sent, Zosyn and Vancomycin has started. The ID consult requested to assist with further evaluation and antibiotic management.     # Right Fifth metatarsal DFU with drainage and Osteomyelitis- wound cx grew Enterococcus, Aeromonas and Klebsiella - Zosyn is the ideal to cover All organisms but kidney function is worsening, hence change to Unasyn and Levaquin until kidney function is improved   # OREN- s/p urinary retention -s/p ibrahim catheter - s/p discontinue ACEI    would recommend:    1. Monitor  kidney function and adjust Abx doses accordingly  2. Follow up pathology result for clear margin, if clear margin then then 7 days of oral abx, otherwise 6 weeks of IV ABx  3. Continue Adjusted doses Levaquin and  Unasyn based on crcl  4. Wound care as per Podiatry  5. IVF and taper off pain medications since become confused    d/w Nursing staff     Attending Attestation:    Spent more than 35 minutes on total encounter, more than 50 % of the visit was spent counseling and/or coordinating care by the Attending physician.

## 2021-09-25 NOTE — CHART NOTE - NSCHARTNOTEFT_GEN_A_CORE
Patient is a 76y old  Male who presents with a chief complaint of R Foot Pain (24 Sep 2021 20:51)  Morning labs reviewed Cr 4.05 and BUN 41      Sep 2021 07:01  -  25 Sep 2021 07:00  --------------------------------------------------------  IN: 100 mL / OUT: 200 mL / NET: -100 mL    25 Sep 2021 07:01  -  25 Sep 2021 07:48  --------------------------------------------------------  IN: 0 mL / OUT: 400 mL / NET: -400 mL      Vital Signs Last 24 Hrs  T(F): 98.1 (25 Sep 2021 04:40), Max: 99.1 (24 Sep 2021 12:56)  HR: 88 (25 Sep 2021 04:40) (81 - 88)  BP: 109/50 (25 Sep 2021 04:40) (101/50 - 123/54)  RR: 17 (25 Sep 2021 04:40) (17 - 19)  SpO2: 96% (25 Sep 2021 04:40) (95% - 96%)    LABS:                9.5    6.96  )-----------( 226      ( 25 Sep 2021 06:40 )             28.3     09-25  139  |  107  |  41<H>  ----------------------------<  134<H>  4.1   |  21<L>  |  4.05<H>    Ca    8.6      25 Sep 2021 06:40  TPro  6.6  /  Alb  2.3<L>  /  TBili  0.5  /  DBili  x   /  AST  25  /  ALT  15  /  AlkPhos  102  09-25    Assessment and plan   76 y old with pmh DM, ongoing foot treatment for suspected osteomyelitis on abtx   ID follows, nephrology follows     Cr 4.05 and BUN 41 today   Normal saline at 100 cc/hr started   f/u nephrology   monitor labs Patient is a 76y old  Male who presents with a chief complaint of R Foot Pain (24 Sep 2021 20:51)  received call from RN that patient with vomiting   Morning labs reviewed Cr 4.05 and BUN 41      Sep 2021 07:01  -  25 Sep 2021 07:00  --------------------------------------------------------  IN: 100 mL / OUT: 200 mL / NET: -100 mL    25 Sep 2021 07:01  -  25 Sep 2021 07:48  --------------------------------------------------------  IN: 0 mL / OUT: 400 mL / NET: -400 mL      Vital Signs Last 24 Hrs  T(F): 98.1 (25 Sep 2021 04:40), Max: 99.1 (24 Sep 2021 12:56)  HR: 88 (25 Sep 2021 04:40) (81 - 88)  BP: 109/50 (25 Sep 2021 04:40) (101/50 - 123/54)  RR: 17 (25 Sep 2021 04:40) (17 - 19)  SpO2: 96% (25 Sep 2021 04:40) (95% - 96%)    LABS:                9.5    6.96  )-----------( 226      ( 25 Sep 2021 06:40 )             28.3     09-25  139  |  107  |  41<H>  ----------------------------<  134<H>  4.1   |  21<L>  |  4.05<H>    Ca    8.6      25 Sep 2021 06:40  TPro  6.6  /  Alb  2.3<L>  /  TBili  0.5  /  DBili  x   /  AST  25  /  ALT  15  /  AlkPhos  102  09-25    Assessment and plan   76 y old with pmh DM, ongoing foot treatment for suspected osteomyelitis on abtx   ID follows, nephrology follows     Cr 4.05 and BUN 41 today   Normal saline at 100 cc/hr started   will keep NPO for now  aspiration precautions      f/u nephrology   monitor labs

## 2021-09-25 NOTE — PROGRESS NOTE ADULT - SUBJECTIVE AND OBJECTIVE BOX
[facility] NEPHROLOGY- PROGRESS NOTE    Patient is a 77yo Male with DM on insulin, HTN, HLD, CAD, s/p R partial 5th ray amputation 2021 p/w R foot pain and foul drainage a/w diabetic foot ulcer suspicious for osteomyelitis. s/p OR debridement today. Nephrology consulted for abrupt rise in SCr.   s/p ibrahim placement for distended bladder on  with ~400ml of urine o/p    Hospital Medications: Medications reviewed.  REVIEW OF SYSTEMS:  CONSTITUTIONAL: No fevers or chills  RESPIRATORY: No shortness of breath  CARDIOVASCULAR: No chest pain.  GASTROINTESTINAL: +nausea with vomiting, Denies diarrhea or abdominal pain.   VASCULAR: No bilateral lower extremity edema. Rt foot with mild pain    VITALS:  T(F): 97.9 (21 @ 12:54), Max: 99.1 (21 @ 20:23)  HR: 85 (21 @ 12:54)  BP: 103/53 (21 @ 12:54)  RR: 18 (21 @ 12:54)  SpO2: 95% (21 @ 12:54)  Wt(kg): --     @ 07:01  -   @ 07:00  --------------------------------------------------------  IN: 100 mL / OUT: 200 mL / NET: -100 mL     @ 07:01  -   @ 16:13  --------------------------------------------------------  IN: 0 mL / OUT: 600 mL / NET: -600 mL        PHYSICAL EXAM:  Gen: NAD, calm  HEENT: MMM  Neck: no JVD  Cards: RRR, +S1/S2, +TRINIDAD  Resp: CTA B/L  GI: soft, NT/ND, NABS  Extremities: no LE edema B/L  : +ibrahim  Derm: Rt foot wrapped/ wound vac    LABS:      139  |  107  |  41<H>  ----------------------------<  134<H>  4.1   |  21<L>  |  4.05<H>    Ca    8.6      25 Sep 2021 06:40    TPro  6.6  /  Alb  2.3<L>  /  TBili  0.5  /  DBili      /  AST  25  /  ALT  15  /  AlkPhos  102      Creatinine Trend: 4.05 <--, 3.97 <--, 3.20 <--, 2.52 <--, 2.38 <--, 1.42 <--, 1.48 <--, 1.85 <--                        9.5    6.96  )-----------( 226      ( 25 Sep 2021 06:40 )             28.3     Urine Studies:  Urinalysis Basic - ( 22 Sep 2021 16:14 )    Color: Yellow / Appearance: Slightly Turbid / S.020 / pH:   Gluc:  / Ketone: Trace  / Bili: Negative / Urobili: Negative   Blood:  / Protein: 30 mg/dL / Nitrite: Negative   Leuk Esterase: Small / RBC: 0-2 /HPF / WBC 3-5 /HPF   Sq Epi:  / Non Sq Epi: Moderate /HPF / Bacteria: Moderate /HPF      Creatinine, Random Urine: 131 mg/dL ( @ 16:14)  Chloride, Random Urine: 52 mmol/L ( @ 16:14)  Sodium, Random Urine: 53 mmol/L ( @ 16:14)

## 2021-09-25 NOTE — PROGRESS NOTE ADULT - SUBJECTIVE AND OBJECTIVE BOX
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 Pt with sharp pain on the R lateral foot. As per daughter, pt was taking tylenol which did not help his pain prompting the patient to ask his daughter to take him to the hospital. Pt is a poor historian. Daughter states that the patient did not see his podiatrist after the surgery. No fever, chest, pain, palpitations, nausea, vomiting, diarrhea (17 Sep 2021 01:11)    Pt is s/p 5th toe wound debridement and wound vac application on 9/22, POD #3. Being treated for OM. Pt with worsening OREN, today Cr 4.05. Pain consulted for foot pain. Pt seen and examined at bedside. Pt laying in bed reports right foot pain score 10/10. Pt describes pain as throbbing, radiating throughout right foot, not alleviated by pain medications, exacerbated by movement. Wound vac in place. Pt now NPO for nausea and vomiting this morning. Pt reports lethargy, Pt denies constipation and itchiness. Reports last BM 9/22. Patient stated goal for pain control: to be able to take deep breaths, get out of bed to chair with tolerable pain control. Reports getting out of bed to chair yesterday.    PAST MEDICAL & SURGICAL HISTORY:  HTN (hypertension)    HLD (hyperlipidemia)    DM (diabetes mellitus)    CAD (coronary artery disease)    Glaucoma, angle-closure    S/P CABG (coronary artery bypass graft)    History of thyroid surgery        FAMILY HISTORY:  Family history of acute myocardial infarction (Father)    Family history of diabetes mellitus (Mother, Sibling)    Family history of hypertension (Mother, Sibling)    Family history of hyperlipidemia (Mother, Sibling)        Social History:  Lives at home  Denies alcohol intake, smoking, and illicit drug use (17 Sep 2021 01:11)    Allergies    No Known Allergies      MEDICATIONS  (STANDING):  acetaminophen  IVPB .. 1000 milliGRAM(s) IV Intermittent once  ampicillin/sulbactam  IVPB 3 Gram(s) IV Intermittent every 12 hours  atorvastatin 80 milliGRAM(s) Oral at bedtime  bisacodyl Suppository 10 milliGRAM(s) Rectal once  cyanocobalamin 1000 MICROGram(s) Oral daily  ergocalciferol 59952 Unit(s) Oral <User Schedule>  ferrous    sulfate 325 milliGRAM(s) Oral daily  fluconAZOLE IVPB      fluconAZOLE IVPB 100 milliGRAM(s) IV Intermittent every 24 hours  heparin   Injectable 5000 Unit(s) SubCutaneous every 8 hours  influenza   Vaccine 0.5 milliLiter(s) IntraMuscular once  insulin lispro (ADMELOG) corrective regimen sliding scale   SubCutaneous Before meals and at bedtime  levoFLOXacin  Tablet 250 milliGRAM(s) Oral every 48 hours  metoprolol succinate  milliGRAM(s) Oral daily  NIFEdipine XL 90 milliGRAM(s) Oral daily  polyethylene glycol 3350 17 Gram(s) Oral daily  senna 2 Tablet(s) Oral at bedtime  sodium chloride 0.9%. 1000 milliLiter(s) (100 mL/Hr) IV Continuous <Continuous>  tamsulosin 0.4 milliGRAM(s) Oral at bedtime    MEDICATIONS  (PRN):  HYDROmorphone  Injectable 0.5 milliGRAM(s) IV Push every 4 hours PRN Severe Pain (7 - 10)  ondansetron Injectable 4 milliGRAM(s) IV Push three times a day PRN Nausea and/or Vomiting      Vital Signs Last 24 Hrs  T(C): 36.7 (25 Sep 2021 04:40), Max: 37.3 (24 Sep 2021 12:56)  T(F): 98.1 (25 Sep 2021 04:40), Max: 99.1 (24 Sep 2021 12:56)  HR: 88 (25 Sep 2021 04:40) (81 - 88)  BP: 109/50 (25 Sep 2021 04:40) (101/50 - 123/54)  BP(mean): --  RR: 17 (25 Sep 2021 04:40) (17 - 19)  SpO2: 96% (25 Sep 2021 04:40) (95% - 96%)  COVID-19 PCR: NotDetec (21 Sep 2021 10:54)  COVID-19 PCR: NotDetec (19 Sep 2021 15:05)  COVID-19 PCR: NotDetec (16 Sep 2021 22:24)  COVID-19 PCR: NotDetec (18 Aug 2021 18:23)  COVID-19 PCR: NotDetec (14 Aug 2021 09:41)    LABS: Reviewed                          9.5    6.96  )-----------( 226      ( 25 Sep 2021 06:40 )             28.3     09-25    139  |  107  |  41<H>  ----------------------------<  134<H>  4.1   |  21<L>  |  4.05<H>    Ca    8.6      25 Sep 2021 06:40    TPro  6.6  /  Alb  2.3<L>  /  TBili  0.5  /  DBili  x   /  AST  25  /  ALT  15  /  AlkPhos  102  09-25      LIVER FUNCTIONS - ( 25 Sep 2021 06:40 )  Alb: 2.3 g/dL / Pro: 6.6 g/dL / ALK PHOS: 102 U/L / ALT: 15 U/L DA / AST: 25 U/L / GGT: x             CAPILLARY BLOOD GLUCOSE      POCT Blood Glucose.: 186 mg/dL (25 Sep 2021 11:19)  POCT Blood Glucose.: 167 mg/dL (25 Sep 2021 08:01)  POCT Blood Glucose.: 154 mg/dL (24 Sep 2021 21:52)  POCT Blood Glucose.: 157 mg/dL (24 Sep 2021 17:23)    COVID-19 PCR: NotDetec (21 Sep 2021 10:54)  COVID-19 PCR: NotDetec (19 Sep 2021 15:05)  COVID-19 PCR: NotDetec (16 Sep 2021 22:24)  COVID-19 PCR: NotDetec (18 Aug 2021 18:23)  COVID-19 PCR: NotDetec (14 Aug 2021 09:41)    Radiology: Reviewed  < from: MR Foot No Cont, Right (09.17.21 @ 13:04) >    EXAM:  MR FOOT RT                            PROCEDURE DATE:  09/17/2021          INTERPRETATION:  Clinical Information: Recent fifth metatarsal head resection now with fifth digit pain, swelling and foul discharge.    Comparison: Radiographs of the right foot from 9/16/2021 and MRI the right foot from 8/16/2021.    Technique:  MRI of the right midfoot and forefoot.  Intravenous Contrast: None.    Findings:    There is a resection at the mid aspect of the fifth metatarsal shaft. There is a lateral soft tissue wound beginning at the level of the amputation which extends distally. There is susceptibility artifact in the region of the amputation consistent with postoperative change. There is hyperintense T2 marrow signal throughout the remaining fifth metatarsal and within the adjacent fourth metatarsal head which is nonspecific and could be related to recent postoperative changes although osteomyelitis is suspected.    There is edema and mild atrophy within the plantar muscles of the foot. There is minimal spurring at the first metatarsophalangeal and hallux sesamoid articulations.    Impression:  Resection at the mid aspect of the fifth metatarsal. Lateral soft tissue wound with marrow signal abnormality throughout the fifth metatarsal and within the adjacent fourth metatarsal head which is nonspecific in the setting of recent surgery although osteomyelitis is suspected.    --- End of Report ---            AMIE ORTIZ MD; Attending Radiologist  This document has been electronically signed. Sep 17 2021  1:49PM    < end of copied text >    < from: US Kidney and Bladder (09.23.21 @ 10:08) >    EXAM:  US KIDNEYS AND BLADDER                            PROCEDURE DATE:  09/23/2021          INTERPRETATION:  CLINICAL INFORMATION: Acute kidney injury. Urinary retention.    COMPARISON: 8/16/2021    TECHNIQUE: Sonography of the kidneys and bladder.    FINDINGS:    Right kidney: 11.7 cm. No renal mass, hydronephrosis or calculi. The right kidney is hyperechoic.    Left kidney: 10.9 cm. No renal mass, hydronephrosis or calculi. The left kidney is hyperechoic.    Urinary bladder: The bladder hasa volume of 520 cc which is largely distended. The bladder is otherwise grossly unremarkable. The patient has no urge to void.    IMPRESSION:    No hydronephrosis.    Both kidneys are hyperechoic for which clinical correlation with intrinsic medical renal disease is recommended.    Largely distended urinary bladder.    --- End of Report ---            RENETTA PERKINS MD; Attending Radiologist  This document has been electronically signed. Sep 23 2021 10:40AM    < end of copied text >      ORT Score -   Family Hx of substance abuse	Female	      Male  Alcohol 	                                           1                     3  Illegal drugs	                                   2                     3  Rx drugs                                           4 	                  4  Personal Hx of substance abuse		  Alcohol 	                                          3	                  3  Illegal drugs                                     4	                  4  Rx drugs                                            5 	                  5  Age between 16- 45 years	           1                     1  hx preadolescent sexual abuse	   3 	                  0  Psychological disease		  ADD, OCD, bipolar, schizophrenia   2	          2  Depression                                           1 	          1  Total: 0    a score of 3 or lower indicates low risk for opioid abuse		  a score of 4-7 indicates moderate risk for opioid abuse		  a score of 8 or higher indicates high risk for opioid abuse    REVIEW OF SYSTEMS:  CONSTITUTIONAL: No fever + fatigue  HEENT:  + [place], no change in vision  NECK: No pain or stiffness  RESPIRATORY: No cough, wheezing, chills or hemoptysis; No shortness of breath  CARDIOVASCULAR: No chest pain, palpitations, dizziness, or leg swelling  GASTROINTESTINAL: No loss of appetite, decreased PO intake. No abdominal or epigastric pain. + nausea and vomiting 9/25 AM; No diarrhea or constipation.   GENITOURINARY: + urinary retention.   MUSCULOSKELETAL: + right foot pain, no swelling; + generalized weakness; no falls   NEURO: No headaches, No numbness/tingling b/l LE  PSYCHIATRIC: No depression, anxiety or difficulty sleeping    PHYSICAL EXAM:  GENERAL:  Alert & Oriented X4, lethargic, cooperative, NAD, Speech is clear.   RESPIRATORY: Respirations even and unlabored. Clear to auscultation bilaterally; No rales, rhonchi, wheezing, or rubs  CARDIOVASCULAR: Normal S1/S2, regular rate and rhythm; + systolic murmur, No rubs, or gallops. No JVD.   GASTROINTESTINAL:  Soft, Nontender, + distended; Bowel sounds present  GENITOURINARY: Urinary catheter draining clear yellow urine.   PERIPHERAL VASCULAR: + right foot wound dressing c/d/i; Extremities warm without edema. 2+ radial Pulses, and 1+left pedal pulse, No cyanosis, No calf tenderness  MUSCULOSKELETAL: Motor Strength 4/5 B/L upper and 3/5 left lower extremities; + right LE + toe wiggle, right foot elevated on pillow; moves all extremities equally against gravity; ROM decreased b/l LE; + right  to palpation.   SKIN: + right foot wound ace dressing c/d/i connected to wound vac.     Risk factors associated with adverse outcomes related to opioid treatment  [ ]  Concurrent benzodiazepine use  [ ]  History/ Active substance use or alcohol use disorder  [ ] Psychiatric co-morbidity  [ ] Sleep apnea  [ ] COPD  [ ] BMI> 35  [ ] Liver dysfunction  [X ] Renal dysfunction  [ ] CHF  [ ] Smoker  [X ]  Age > 60 years    [X ]  [place]  Reviewed and Copied to Chart. See below.    Plan of care and goal oriented pain management treatment options were discussed with patient and /or primary care giver; all questions and concerns were addressed and care was aligned with patient's wishes.    Educated patient on goal oriented pain management treatment options     09-25-21 @ 11:55

## 2021-09-25 NOTE — PROGRESS NOTE ADULT - PROBLEM SELECTOR PLAN 7
PT for evaluation for safe discharge planning  wound VAC in place   f/u surgical path  IV anti-biotics if no clear margins on bone culture, will discuss with renal if can place PICC

## 2021-09-25 NOTE — PROGRESS NOTE ADULT - PROBLEM SELECTOR PLAN 1
as a consequence of poorly controlled diabetes   continue with Unasyn, Levofloxacin --> renally dosed   afebrile,  no leukocytosis  s/p OR for wound debridement 9/22  local wound care as per podiatry with wound vac.  ESR, CRP elevated   cardio Dr. Wilkins consulted  ID: Dr. Barrett  pain management following   wound VAC and wound care as per podiatry  f/u surgical patholgy

## 2021-09-25 NOTE — PROGRESS NOTE ADULT - PROBLEM SELECTOR PLAN 3
knee pain/injury OREN superimposed on Stage III CKD  concern for AIN 2/2 antibiotics   Creatinine 3.2 this AM with bicarb 18, trending up  f/u urine lytes to calculate FENa, c/w IVF for 12 hours   avoid nephrotoxic agents, dose as per GFR  monitor creatinine, electrolytes  holding Lisinopril, Nifedipine incr. 90 mg daily   f/u C3/ C4; ASO negative  Urine eosinophils negative  renal ultrasound negative for hydronephrosis, hyperechoic kidneys correlate clinically   UO improving, hopeful for renal recovery  bladder scan to r/o retention  Nephrology Dr. Rodriguez consulted

## 2021-09-25 NOTE — PROGRESS NOTE ADULT - SUBJECTIVE AND OBJECTIVE BOX
C A R D I O L O G Y  **********************************     DATE OF SERVICE: 09-25-21    Has pain in right foot.  Patient denies chest pain or shortness of breath.   Review of symptoms otherwise negative.    acetaminophen  IVPB .. 1000 milliGRAM(s) IV Intermittent once  ampicillin/sulbactam  IVPB 3 Gram(s) IV Intermittent every 12 hours  atorvastatin 80 milliGRAM(s) Oral at bedtime  bisacodyl Suppository 10 milliGRAM(s) Rectal once  cyanocobalamin 1000 MICROGram(s) Oral daily  ergocalciferol 15570 Unit(s) Oral <User Schedule>  ferrous    sulfate 325 milliGRAM(s) Oral daily  fluconAZOLE IVPB      fluconAZOLE IVPB 100 milliGRAM(s) IV Intermittent every 24 hours  heparin   Injectable 5000 Unit(s) SubCutaneous every 8 hours  HYDROmorphone  Injectable 0.5 milliGRAM(s) IV Push every 4 hours PRN  influenza   Vaccine 0.5 milliLiter(s) IntraMuscular once  insulin lispro (ADMELOG) corrective regimen sliding scale   SubCutaneous Before meals and at bedtime  levoFLOXacin  Tablet 250 milliGRAM(s) Oral every 48 hours  metoprolol succinate  milliGRAM(s) Oral daily  NIFEdipine XL 90 milliGRAM(s) Oral daily  ondansetron Injectable 4 milliGRAM(s) IV Push three times a day PRN  polyethylene glycol 3350 17 Gram(s) Oral daily  senna 2 Tablet(s) Oral at bedtime  sodium chloride 0.9%. 1000 milliLiter(s) IV Continuous <Continuous>  tamsulosin 0.4 milliGRAM(s) Oral at bedtime                            9.5    6.96  )-----------( 226      ( 25 Sep 2021 06:40 )             28.3       09-25    139  |  107  |  41<H>  ----------------------------<  134<H>  4.1   |  21<L>  |  4.05<H>    Ca    8.6      25 Sep 2021 06:40    TPro  6.6  /  Alb  2.3<L>  /  TBili  0.5  /  DBili  x   /  AST  25  /  ALT  15  /  AlkPhos  102  09-25            T(C): 36.7 (09-25-21 @ 04:40), Max: 37.3 (09-24-21 @ 12:56)  HR: 88 (09-25-21 @ 04:40) (81 - 88)  BP: 109/50 (09-25-21 @ 04:40) (101/50 - 123/54)  RR: 17 (09-25-21 @ 04:40) (17 - 19)  SpO2: 96% (09-25-21 @ 04:40) (95% - 96%)  Wt(kg): --    I&O's Summary    24 Sep 2021 07:01  -  25 Sep 2021 07:00  --------------------------------------------------------  IN: 100 mL / OUT: 200 mL / NET: -100 mL    25 Sep 2021 07:01  -  25 Sep 2021 12:55  --------------------------------------------------------  IN: 0 mL / OUT: 400 mL / NET: -400 mL        Gen: Appears well in NAD  HEENT:  (-)icterus (-)pallor  CV: N S1 S2 1/6 [place] (+)2 Pulses B/l  Resp:  Clear to ausculatation B/L, normal effort  GI: (+) BS Soft, NT, ND  Lymph:  (-)Edema, (-)obvious lymphadenopathy  Skin: Warm to touch, Normal turgor  Psych: Appropriate mood and affect      ASSESSMENT/PLAN: 	76y  Male PMH DM on insulin, HTN, HLD, normal lV fx moderate to severe AS 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    - Abx per primary team  - will need SABIHA for eval of AS as an outpt with His cardiologist at [facility]   - Progressive renal dysfunction no clinical CHF, renal f/u    Rob Wu, M.D.  Cardiac Electrophysiology  [phone]

## 2021-09-25 NOTE — PROGRESS NOTE ADULT - ASSESSMENT
Calculator page.   This informational tool is a resource and is not meant for direct clinical application.   Patient Search   Multi-Patient Search   MME Calculator   Reports   Drug List   Designation   My CORY #  Data Detail Level: Printer-Friendly View Extended View  Confidential Drug Utilization Report  Search Terms: alfred villagran, 1945Search Date: 09/18/2021 13:46:54 PM  The Drug Utilization Report below displays all of the controlled substance prescriptions, if any, that your patient has filled in the last twelve months. The information displayed on this report is compiled from pharmacy submissions to the Department, and accurately reflects the information as submitted by the pharmacies.    This report was requested by: Minh Slater | Reference #: [id number]    There are no results for the search terms that you entered.

## 2021-09-25 NOTE — PROGRESS NOTE ADULT - ASSESSMENT
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 s/p OR debridement today. Nephrology consulted for abrupt rise in SCr.     1. OREN- likely ATN in the setting of infection and ACEi use. Lisinopril discontinued 9/22. Pt with worsening renal function but likely plateauing; will monitor for recovery. Kidney/ Bladder US with large distended bladder s/p ibrahim placement on 9/23; renal function did not improve post ibrahim; less likely due to obstructive uropathy. UA with 30 protein, trace blood with small LE, Check UCX to r/o UTI.   FeNa 0.72% and FeUrea 19.39%; consistent with pre-renal OREN. However renal function worsening on IVF. Now on brief course of IVF due to n/v.   No peripheral eosinophilia- no signs of AIN. C3 & C4 wnl with neg ASO- no signs of PIGN. Strict I/Os. Avoid nephrotoxins/ NSAIDs/ RCA. Monitor BMP.  2. 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 Will defer secondary w/u as an outpt. Avoid nephrotoxins  3. HTN 2/2 CKD- BP low normal. Lisinopril d/c due to OREN. Will decrease Nifedipine ER to 60mg PO qd. Monitor BP  4. Acute osteomyelitis- s/p debridement 9/22. Pt on Unasyn/ Levaquin renally dosed. Plan as per ID      [facility] NEPHROLOGY  Bryn Johnson M.D.  Domingo Booth D.O.  Zakia Rodriguez M.D.  Zonia Adams, MSN, ANP-C  [phone]    [address] [place] [address]

## 2021-09-26 DIAGNOSIS — K21.9 GASTRO-ESOPHAGEAL REFLUX DISEASE WITHOUT ESOPHAGITIS: ICD-10-CM

## 2021-09-26 DIAGNOSIS — K59.00 CONSTIPATION, UNSPECIFIED: ICD-10-CM

## 2021-09-26 LAB
ALBUMIN SERPL ELPH-MCNC: 2.3 G/DL — LOW (ref 3.5–5)
ALP SERPL-CCNC: 95 U/L — SIGNIFICANT CHANGE UP (ref 40–120)
ALT FLD-CCNC: 13 U/L DA — SIGNIFICANT CHANGE UP (ref 10–60)
ANION GAP SERPL CALC-SCNC: 13 MMOL/L — SIGNIFICANT CHANGE UP (ref 5–17)
AST SERPL-CCNC: 24 U/L — SIGNIFICANT CHANGE UP (ref 10–40)
BILIRUB SERPL-MCNC: 0.4 MG/DL — SIGNIFICANT CHANGE UP (ref 0.2–1.2)
BUN SERPL-MCNC: 47 MG/DL — HIGH (ref 7–18)
CALCIUM SERPL-MCNC: 8.3 MG/DL — LOW (ref 8.4–10.5)
CHLORIDE SERPL-SCNC: 109 MMOL/L — HIGH (ref 96–108)
CO2 SERPL-SCNC: 19 MMOL/L — LOW (ref 22–31)
CREAT SERPL-MCNC: 3.83 MG/DL — HIGH (ref 0.5–1.3)
GLUCOSE BLDC GLUCOMTR-MCNC: 158 MG/DL — HIGH (ref 70–99)
GLUCOSE BLDC GLUCOMTR-MCNC: 175 MG/DL — HIGH (ref 70–99)
GLUCOSE BLDC GLUCOMTR-MCNC: 184 MG/DL — HIGH (ref 70–99)
GLUCOSE BLDC GLUCOMTR-MCNC: 190 MG/DL — HIGH (ref 70–99)
GLUCOSE SERPL-MCNC: 144 MG/DL — HIGH (ref 70–99)
HCT VFR BLD CALC: 26.9 % — LOW (ref 39–50)
HGB BLD-MCNC: 8.9 G/DL — LOW (ref 13–17)
MCHC RBC-ENTMCNC: 29.8 PG — SIGNIFICANT CHANGE UP (ref 27–34)
MCHC RBC-ENTMCNC: 33.1 GM/DL — SIGNIFICANT CHANGE UP (ref 32–36)
MCV RBC AUTO: 90 FL — SIGNIFICANT CHANGE UP (ref 80–100)
NRBC # BLD: 0 /100 WBCS — SIGNIFICANT CHANGE UP (ref 0–0)
PLATELET # BLD AUTO: 248 K/UL — SIGNIFICANT CHANGE UP (ref 150–400)
POTASSIUM SERPL-MCNC: 4.1 MMOL/L — SIGNIFICANT CHANGE UP (ref 3.5–5.3)
POTASSIUM SERPL-SCNC: 4.1 MMOL/L — SIGNIFICANT CHANGE UP (ref 3.5–5.3)
PROT SERPL-MCNC: 6.8 G/DL — SIGNIFICANT CHANGE UP (ref 6–8.3)
RBC # BLD: 2.99 M/UL — LOW (ref 4.2–5.8)
RBC # FLD: 13.8 % — SIGNIFICANT CHANGE UP (ref 10.3–14.5)
SODIUM SERPL-SCNC: 141 MMOL/L — SIGNIFICANT CHANGE UP (ref 135–145)
WBC # BLD: 7.45 K/UL — SIGNIFICANT CHANGE UP (ref 3.8–10.5)
WBC # FLD AUTO: 7.45 K/UL — SIGNIFICANT CHANGE UP (ref 3.8–10.5)

## 2021-09-26 PROCEDURE — 99221 1ST HOSP IP/OBS SF/LOW 40: CPT

## 2021-09-26 PROCEDURE — 74018 RADEX ABDOMEN 1 VIEW: CPT | Mod: 26

## 2021-09-26 PROCEDURE — 99233 SBSQ HOSP IP/OBS HIGH 50: CPT

## 2021-09-26 PROCEDURE — 71045 X-RAY EXAM CHEST 1 VIEW: CPT | Mod: 26

## 2021-09-26 RX ORDER — LACTULOSE 10 G/15ML
10 SOLUTION ORAL
Refills: 0 | Status: DISCONTINUED | OUTPATIENT
Start: 2021-09-26 | End: 2021-09-26

## 2021-09-26 RX ORDER — SODIUM CHLORIDE 9 MG/ML
1000 INJECTION INTRAMUSCULAR; INTRAVENOUS; SUBCUTANEOUS
Refills: 0 | Status: DISCONTINUED | OUTPATIENT
Start: 2021-09-26 | End: 2021-09-26

## 2021-09-26 RX ORDER — LACTULOSE 10 G/15ML
20 SOLUTION ORAL ONCE
Refills: 0 | Status: COMPLETED | OUTPATIENT
Start: 2021-09-26 | End: 2021-09-26

## 2021-09-26 RX ADMIN — Medication 1000 MILLIGRAM(S): at 09:25

## 2021-09-26 RX ADMIN — AMPICILLIN SODIUM AND SULBACTAM SODIUM 200 GRAM(S): 250; 125 INJECTION, POWDER, FOR SUSPENSION INTRAMUSCULAR; INTRAVENOUS at 05:44

## 2021-09-26 RX ADMIN — FLUCONAZOLE 100 MILLIGRAM(S): 150 TABLET ORAL at 21:20

## 2021-09-26 RX ADMIN — Medication 1000 MILLIGRAM(S): at 02:24

## 2021-09-26 RX ADMIN — Medication 1000 MILLIGRAM(S): at 08:25

## 2021-09-26 RX ADMIN — Medication 60 MILLIGRAM(S): at 05:44

## 2021-09-26 RX ADMIN — HEPARIN SODIUM 5000 UNIT(S): 5000 INJECTION INTRAVENOUS; SUBCUTANEOUS at 21:20

## 2021-09-26 RX ADMIN — PREGABALIN 1000 MICROGRAM(S): 225 CAPSULE ORAL at 12:05

## 2021-09-26 RX ADMIN — LACTULOSE 10 GRAM(S): 10 SOLUTION ORAL at 13:31

## 2021-09-26 RX ADMIN — POLYETHYLENE GLYCOL 3350 17 GRAM(S): 17 POWDER, FOR SOLUTION ORAL at 12:06

## 2021-09-26 RX ADMIN — Medication 325 MILLIGRAM(S): at 12:05

## 2021-09-26 RX ADMIN — Medication 1: at 12:05

## 2021-09-26 RX ADMIN — AMPICILLIN SODIUM AND SULBACTAM SODIUM 200 GRAM(S): 250; 125 INJECTION, POWDER, FOR SUSPENSION INTRAMUSCULAR; INTRAVENOUS at 17:46

## 2021-09-26 RX ADMIN — ERGOCALCIFEROL 50000 UNIT(S): 1.25 CAPSULE ORAL at 12:05

## 2021-09-26 RX ADMIN — HEPARIN SODIUM 5000 UNIT(S): 5000 INJECTION INTRAVENOUS; SUBCUTANEOUS at 05:45

## 2021-09-26 RX ADMIN — SODIUM CHLORIDE 100 MILLILITER(S): 9 INJECTION INTRAMUSCULAR; INTRAVENOUS; SUBCUTANEOUS at 12:06

## 2021-09-26 RX ADMIN — Medication 1: at 08:25

## 2021-09-26 RX ADMIN — Medication 1000 MILLIGRAM(S): at 01:36

## 2021-09-26 RX ADMIN — HEPARIN SODIUM 5000 UNIT(S): 5000 INJECTION INTRAVENOUS; SUBCUTANEOUS at 13:32

## 2021-09-26 RX ADMIN — SODIUM CHLORIDE 50 MILLILITER(S): 9 INJECTION INTRAMUSCULAR; INTRAVENOUS; SUBCUTANEOUS at 13:33

## 2021-09-26 NOTE — PROGRESS NOTE ADULT - PROBLEM SELECTOR PLAN 3
AXR with colonic distention, hyperactive bowel sounds  did not tolerate oral laxatives  give tap water enema

## 2021-09-26 NOTE — PROGRESS NOTE ADULT - ASSESSMENT
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  As per daughter, patient was taking tylenol which did not help his pain prompting the patient to ask his daughter to take him to the hospital. ON admission, he has no fever or Leukocytosis. The Xray of Right foot shows no Osteomyelitis but MRI of Right foot shows Lateral soft tissue wound with marrow signal abnormality throughout the fifth metatarsal which is consistent of Osteomyelitis. He has seen by Podiatry and wound culture has sent, Zosyn and Vancomycin has started. The ID consult requested to assist with further evaluation and antibiotic management.     # Right Fifth metatarsal DFU with drainage and Osteomyelitis- wound cx grew Enterococcus, Aeromonas and Klebsiella - Zosyn is the ideal to cover All organisms but kidney function is worsening, hence change to Unasyn and Levaquin until kidney function is improved   # OREN- s/p urinary retention -s/p ibrahim catheter - s/p discontinue ACEI    would recommend:    1. Monitor  kidney function, started to improve slowly   2. Follow up pathology result for clear margin, if clear margin then then 7 days of oral abx, otherwise 6 weeks of IV ABx  3. Continue Adjusted doses Levaquin and  Unasyn based on crcl  4. Wound care as per Podiatry  5. IVF and taper off pain medications since become confused    d/w Nursing staff     Attending Attestation:    Spent more than 35 minutes on total encounter, more than 50 % of the visit was spent counseling and/or coordinating care by the Attending physician. 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  As per daughter, patient was taking tylenol which did not help his pain prompting the patient to ask his daughter to take him to the hospital. ON admission, he has no fever or Leukocytosis. The Xray of Right foot shows no Osteomyelitis but MRI of Right foot shows Lateral soft tissue wound with marrow signal abnormality throughout the fifth metatarsal which is consistent of Osteomyelitis. He has seen by Podiatry and wound culture has sent, Zosyn and Vancomycin has started. The ID consult requested to assist with further evaluation and antibiotic management.     # Right Fifth metatarsal DFU with drainage and Osteomyelitis- wound cx grew Enterococcus, Aeromonas and Klebsiella - Zosyn is the ideal to cover All organisms but kidney function is worsening, hence change to Unasyn and Levaquin until kidney function is improved   # OREN- s/p urinary retention -s/p ibrahim catheter - s/p discontinue ACEI  # RLL pneumonia- most likely Aspiration, S/p Vomiting - On Unasyn    would recommend:    1. NGT suctioned  as per primary team  2. Monitor  kidney function, started to improve slowly   3. Follow up pathology result for clear margin, if clear margin then then 7 days of oral abx, otherwise 6 weeks of IV ABx  4. Continue Adjusted doses Levaquin and  Unasyn based on crcl  5. Wound care as per Podiatry  6. IVF and taper off pain medications since become confused    d/w Nursing staff     Attending Attestation:    Spent more than 35 minutes on total encounter, more than 50 % of the visit was spent counseling and/or coordinating care by the Attending physician.

## 2021-09-26 NOTE — PROGRESS NOTE ADULT - SUBJECTIVE AND OBJECTIVE BOX
Podiatry Interval HPI:     Podiatry HPI: 76 year old male with a PMHx of DM, HTN, HLD, CAD to ED presents to the ED for a Right Foot s/p 5th foot partial ray resection and fillet toe flap. Patient states that he has sharp localized non-radiating pain to the Right foot 5th metatarsal. States that after his surgery, he did not see a podiatrist and he came into the ED because he saw drainage and was having pain. Denies any constitutional symptoms of N/V/C/F/SOB. Denies any other pedal complaints at this time. Denies calf pain today, bilaterally.    Medications ampicillin/sulbactam  IVPB 3 Gram(s) IV Intermittent every 12 hours  atorvastatin 80 milliGRAM(s) Oral at bedtime  bisacodyl Suppository 10 milliGRAM(s) Rectal once  cyanocobalamin 1000 MICROGram(s) Oral daily  ergocalciferol 91108 Unit(s) Oral <User Schedule>  ferrous    sulfate 325 milliGRAM(s) Oral daily  fluconAZOLE IVPB      fluconAZOLE IVPB 100 milliGRAM(s) IV Intermittent every 24 hours  heparin   Injectable 5000 Unit(s) SubCutaneous every 8 hours  HYDROmorphone  Injectable 0.5 milliGRAM(s) IV Push daily PRN  influenza   Vaccine 0.5 milliLiter(s) IntraMuscular once  insulin lispro (ADMELOG) corrective regimen sliding scale   SubCutaneous Before meals and at bedtime  levoFLOXacin  Tablet 250 milliGRAM(s) Oral every 48 hours  metoprolol succinate  milliGRAM(s) Oral daily  NIFEdipine XL 60 milliGRAM(s) Oral daily  ondansetron Injectable 4 milliGRAM(s) IV Push three times a day PRN  oxyCODONE    IR 5 milliGRAM(s) Oral every 4 hours PRN  polyethylene glycol 3350 17 Gram(s) Oral daily  senna 2 Tablet(s) Oral at bedtime  tamsulosin 0.4 milliGRAM(s) Oral at bedtime    FHFamily history of acute myocardial infarction (Father)    Family history of diabetes mellitus (Mother, Sibling)    Family history of hypertension (Mother, Sibling)    Family history of hyperlipidemia (Mother, Sibling)    ,   PMHHTN (hypertension)    HLD (hyperlipidemia)    DM (diabetes mellitus)    CAD (coronary artery disease)    Glaucoma, angle-closure    [place] (hard of hearing)       PSHNo significant past surgical history    S/P CABG (coronary artery bypass graft)    History of thyroid surgery        Labs                          8.9    7.45  )-----------( 248      ( 26 Sep 2021 07:23 )             26.9      09-26    141  |  109<H>  |  47<H>  ----------------------------<  144<H>  4.1   |  19<L>  |  3.83<H>    Ca    8.3<L>      26 Sep 2021 07:23    TPro  6.8  /  Alb  2.3<L>  /  TBili  0.4  /  DBili  x   /  AST  24  /  ALT  13  /  AlkPhos  95  09-26     Vital Signs Last 24 Hrs  T(C): 36.7 (26 Sep 2021 20:09), Max: 36.7 (26 Sep 2021 04:49)  T(F): 98 (26 Sep 2021 20:09), Max: 98 (26 Sep 2021 04:49)  HR: 95 (26 Sep 2021 20:09) (87 - 95)  BP: 126/59 (26 Sep 2021 20:09) (108/54 - 126/59)  BP(mean): --  RR: 20 (26 Sep 2021 20:09) (19 - 20)  SpO2: 94% (26 Sep 2021 20:09) (87% - 95%)  Sedimentation Rate, Erythrocyte: 77 mm/Hr (09-16-21 @ 16:03)  Sedimentation Rate, Erythrocyte: 91 mm/Hr (08-22-21 @ 15:41)         C-Reactive Protein, Serum: 45 mg/L (09-16-21 @ 23:33)  C-Reactive Protein, Serum: 85 mg/L (08-22-21 @ 21:30)  C-Reactive Protein, Serum: 67 mg/L (08-15-21 @ 11:55)   WBC Count: 7.45 K/uL (09-26-21 @ 07:23)      PHYSICAL EXAM  GEN: SHER ROBERT is a pleasant well-nourished, well developed 76y Male in no acute distress, alert awake, and oriented to person, place and time.   LE Focused:    Vasc:  DP/PT pulses were faintly palpable, bilateral. DP/PT pulses were monophasic on doppler, bilaterally. CFT<3 seconds to all digits.   Derm: Surgical with graft in place, granular wound bed through the graft, minimal drainage or discharge. minimal erythema , edema noted  Neuro: Protective sensation diminished, b/l  MSK: +5/5 muscle strength noted to the pedal compartments. 5th digit amputation on right foot, noted.     Imaging:  INTERPRETATION:  Postop, rule out infection.    3 views right foot. Prior 8/20/2021.     Status post resection of the fifth toe and distal fifth metatarsal unchanged in appearance. No focal bone lysis or unusual periosteal reaction to suggest osteomyelitis. No acute fracture or dislocation. The remainder study unchanged. No soft tissue gas.    IMPRESSION: No acute finding. No plain film evidence of osteomyelitis.        MRI R foot:     INTERPRETATION:  Clinical Information: Recent fifth metatarsal head resection now with fifth digit pain, swelling and foul discharge.    Comparison: Radiographs of the right foot from 9/16/2021 and MRI the right foot from 8/16/2021.    Technique:  MRI of the right midfoot and forefoot.  Intravenous Contrast: None.    Findings:    There is a resection at the mid aspect of the fifth metatarsal shaft. There is a lateral soft tissue wound beginning at the level of the amputation which extends distally. There is susceptibility artifact in the region of the amputation consistent with postoperative change. There is hyperintense T2 marrow signal throughout the remaining fifth metatarsal and within the adjacent fourth metatarsal head which is nonspecific and could be related to recent postoperative changes although osteomyelitis is suspected.    There is edema and mild atrophy within the plantar muscles of the foot. There is minimal spurring at the first metatarsophalangeal and hallux sesamoid articulations.    Impression:  Resection at the mid aspect of the fifth metatarsal. Lateral soft tissue wound with marrow signal abnormality throughout the fifth metatarsal and within the adjacent fourth metatarsal head which is nonspecific in the setting of recent surgery although osteomyelitis is suspected.      Culture Results:   Rare Aeromonas hydrophila/caviae   Few Klebsiella oxytoca/Raoutella ornithinolytica   Few Enterococcus faecalis   Few Streptococcus agalactiae (Group B) isolated   Group B streptococci are susceptible to ampicillin,   penicillin and cefazolin, but may be resistant to   erythromycin and clindamycin.   Recommendations for intrapartum prophylaxis for Group B   streptococci are penicillin or ampicillin. (09.16.21 @ 21:42)     Gram Stain:   No polymorphonuclear cells seen per low power field   No organisms seen per oil power field (09.22.21 @ 13:54)       Historical Values  Gram Stain:   No polymorphonuclear cells seen per low power field   No organisms seen per oil power field (09.22.21 @ 13:54)   Gram Stain:   No polymorphonuclear leukocytes seen per low power field   No organisms seen per oil power field (08.20.21 @ 03:59)       A:   s/p R 5th ray resection - 8/19  s/p R 5th wound debridement, graft application, bone biopsy and wound vac application 9/22     P:  Patient evaluated, chart reviewed  Explained all clinical findings to the patient and the daughter of the patient to their satisfaction  Answered all questions and concerns of the patient and the patient's daughter to their satisfaction   Xrays reviewed  ESR/CRP reviewed   MRI reviewed   Intra-op culture no growth as above  intra-op bone biopsy pending, will f/u  Wound Vac be changed tomorrow (9/27)  Continue local wound care to allow graft incorporation  Dressing change will be managed by podiatry for now  ID is on board  Podiatry will follow while in house  Discussed with Dr. Vazquez Podiatry Interval HPI: Patient was unable to give a thorough history today. Patient will be getting surgery per surgery team. Patient is NPO.     Podiatry HPI: 76 year old male with a PMHx of DM, HTN, HLD, CAD to ED presents to the ED for a Right Foot s/p 5th foot partial ray resection and fillet toe flap. Patient states that he has sharp localized non-radiating pain to the Right foot 5th metatarsal. States that after his surgery, he did not see a podiatrist and he came into the ED because he saw drainage and was having pain. Denies any constitutional symptoms of N/V/C/F/SOB. Denies any other pedal complaints at this time. Denies calf pain today, bilaterally.    Medications ampicillin/sulbactam  IVPB 3 Gram(s) IV Intermittent every 12 hours  atorvastatin 80 milliGRAM(s) Oral at bedtime  bisacodyl Suppository 10 milliGRAM(s) Rectal once  cyanocobalamin 1000 MICROGram(s) Oral daily  ergocalciferol 81822 Unit(s) Oral <User Schedule>  ferrous    sulfate 325 milliGRAM(s) Oral daily  fluconAZOLE IVPB      fluconAZOLE IVPB 100 milliGRAM(s) IV Intermittent every 24 hours  heparin   Injectable 5000 Unit(s) SubCutaneous every 8 hours  HYDROmorphone  Injectable 0.5 milliGRAM(s) IV Push daily PRN  influenza   Vaccine 0.5 milliLiter(s) IntraMuscular once  insulin lispro (ADMELOG) corrective regimen sliding scale   SubCutaneous Before meals and at bedtime  levoFLOXacin  Tablet 250 milliGRAM(s) Oral every 48 hours  metoprolol succinate  milliGRAM(s) Oral daily  NIFEdipine XL 60 milliGRAM(s) Oral daily  ondansetron Injectable 4 milliGRAM(s) IV Push three times a day PRN  oxyCODONE    IR 5 milliGRAM(s) Oral every 4 hours PRN  polyethylene glycol 3350 17 Gram(s) Oral daily  senna 2 Tablet(s) Oral at bedtime  tamsulosin 0.4 milliGRAM(s) Oral at bedtime    FHFamily history of acute myocardial infarction (Father)    Family history of diabetes mellitus (Mother, Sibling)    Family history of hypertension (Mother, Sibling)    Family history of hyperlipidemia (Mother, Sibling)    ,   PMHHTN (hypertension)    HLD (hyperlipidemia)    DM (diabetes mellitus)    CAD (coronary artery disease)    Glaucoma, angle-closure    [place] (hard of hearing)       PSHNo significant past surgical history    S/P CABG (coronary artery bypass graft)    History of thyroid surgery        Labs                          8.9    7.45  )-----------( 248      ( 26 Sep 2021 07:23 )             26.9      09-26    141  |  109<H>  |  47<H>  ----------------------------<  144<H>  4.1   |  19<L>  |  3.83<H>    Ca    8.3<L>      26 Sep 2021 07:23    TPro  6.8  /  Alb  2.3<L>  /  TBili  0.4  /  DBili  x   /  AST  24  /  ALT  13  /  AlkPhos  95  09-26     Vital Signs Last 24 Hrs  T(C): 36.7 (26 Sep 2021 20:09), Max: 36.7 (26 Sep 2021 04:49)  T(F): 98 (26 Sep 2021 20:09), Max: 98 (26 Sep 2021 04:49)  HR: 95 (26 Sep 2021 20:09) (87 - 95)  BP: 126/59 (26 Sep 2021 20:09) (108/54 - 126/59)  BP(mean): --  RR: 20 (26 Sep 2021 20:09) (19 - 20)  SpO2: 94% (26 Sep 2021 20:09) (87% - 95%)  Sedimentation Rate, Erythrocyte: 77 mm/Hr (09-16-21 @ 16:03)  Sedimentation Rate, Erythrocyte: 91 mm/Hr (08-22-21 @ 15:41)         C-Reactive Protein, Serum: 45 mg/L (09-16-21 @ 23:33)  C-Reactive Protein, Serum: 85 mg/L (08-22-21 @ 21:30)  C-Reactive Protein, Serum: 67 mg/L (08-15-21 @ 11:55)   WBC Count: 7.45 K/uL (09-26-21 @ 07:23)      PHYSICAL EXAM (Unable to conduct a thorough physical examination today, physical examination per last time)  LE Focused:    Vasc:  DP/PT pulses were faintly palpable, bilateral. DP/PT pulses were monophasic on doppler, bilaterally. CFT<3 seconds to all digits.   Derm: Surgical with graft in place, granular wound bed through the graft, minimal drainage or discharge. minimal erythema , edema noted  Neuro: Protective sensation diminished, b/l  MSK: +5/5 muscle strength noted to the pedal compartments. 5th digit amputation on right foot, noted.     Imaging:  INTERPRETATION:  Postop, rule out infection.    3 views right foot. Prior 8/20/2021.     Status post resection of the fifth toe and distal fifth metatarsal unchanged in appearance. No focal bone lysis or unusual periosteal reaction to suggest osteomyelitis. No acute fracture or dislocation. The remainder study unchanged. No soft tissue gas.    IMPRESSION: No acute finding. No plain film evidence of osteomyelitis.        MRI R foot:     INTERPRETATION:  Clinical Information: Recent fifth metatarsal head resection now with fifth digit pain, swelling and foul discharge.    Comparison: Radiographs of the right foot from 9/16/2021 and MRI the right foot from 8/16/2021.    Technique:  MRI of the right midfoot and forefoot.  Intravenous Contrast: None.    Findings:    There is a resection at the mid aspect of the fifth metatarsal shaft. There is a lateral soft tissue wound beginning at the level of the amputation which extends distally. There is susceptibility artifact in the region of the amputation consistent with postoperative change. There is hyperintense T2 marrow signal throughout the remaining fifth metatarsal and within the adjacent fourth metatarsal head which is nonspecific and could be related to recent postoperative changes although osteomyelitis is suspected.    There is edema and mild atrophy within the plantar muscles of the foot. There is minimal spurring at the first metatarsophalangeal and hallux sesamoid articulations.    Impression:  Resection at the mid aspect of the fifth metatarsal. Lateral soft tissue wound with marrow signal abnormality throughout the fifth metatarsal and within the adjacent fourth metatarsal head which is nonspecific in the setting of recent surgery although osteomyelitis is suspected.      Culture Results:   Rare Aeromonas hydrophila/caviae   Few Klebsiella oxytoca/Raoutella ornithinolytica   Few Enterococcus faecalis   Few Streptococcus agalactiae (Group B) isolated   Group B streptococci are susceptible to ampicillin,   penicillin and cefazolin, but may be resistant to   erythromycin and clindamycin.   Recommendations for intrapartum prophylaxis for Group B   streptococci are penicillin or ampicillin. (09.16.21 @ 21:42)     Gram Stain:   No polymorphonuclear cells seen per low power field   No organisms seen per oil power field (09.22.21 @ 13:54)       Historical Values  Gram Stain:   No polymorphonuclear cells seen per low power field   No organisms seen per oil power field (09.22.21 @ 13:54)   Gram Stain:   No polymorphonuclear leukocytes seen per low power field   No organisms seen per oil power field (08.20.21 @ 03:59)       A:   s/p R 5th ray resection - 8/19  s/p R 5th wound debridement, graft application, bone biopsy and wound vac application 9/22     P:  Patient evaluated, chart reviewed  Xrays reviewed  ESR/CRP reviewed   MRI reviewed   Intra-op culture no growth as above  intra-op bone biopsy pending, will f/u  Wound Vac be changed tomorrow (9/27)  Continue local wound care to allow graft incorporation  Dressing change will be managed by podiatry for now  ID is on board  Podiatry will follow while in house  Discussed with Dr. Vazquez

## 2021-09-26 NOTE — PROGRESS NOTE ADULT - PROBLEM SELECTOR PLAN 2
patient had reflux with lactulose and likely aspirated  CXR with consolidation on RLL  already covered on Levaquin and Unasyn  monitor respiratory status  NPO, give meds through NGT

## 2021-09-26 NOTE — PROGRESS NOTE ADULT - PROBLEM SELECTOR PLAN 5
OREN superimposed on Stage III CKD  concern for AIN 2/2 antibiotics   Creatinine 3.2 this AM with bicarb 18, trending up  f/u urine lytes to calculate FENa, c/w IVF for 12 hours   avoid nephrotoxic agents, dose as per GFR  monitor creatinine, electrolytes  holding Lisinopril, Nifedipine incr. 90 mg daily   f/u C3/ C4; ASO negative  Urine eosinophils negative  renal ultrasound negative for hydronephrosis, hyperechoic kidneys significant for medical renal disease  UO improving, Cr mildly improving  Strict I&O, if Cr improving will perform TOV  Nephrology Dr. Rodriguez consulted

## 2021-09-26 NOTE — PROGRESS NOTE ADULT - PROBLEM SELECTOR PLAN 1
as a consequence of poorly controlled diabetes   continue with Unasyn, Levofloxacin --> renally dosed   afebrile,  no leukocytosis  s/p OR for wound debridement 9/22  local wound care as per podiatry with wound vac.  ESR, CRP elevated   cardio Dr. Wilkins consulted  ID: Dr. Barrett  pain management following   wound VAC and wound care as per podiatry  f/u surgical pathology for margins

## 2021-09-26 NOTE — CHART NOTE - NSCHARTNOTEFT_GEN_A_CORE
This is a 76M from home, lives with daughter w/ PMH DM on insulin, HTN, HLD, CAD, 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 Pt with sharp pain on the R lateral foot.     Nurse at bedside informed of vomiting after ingestion of lactulose. Arrived at bedside, Pulse 80% on room air. Coarse breath sounds. Attending at bedside, placed NGT to LWS suction     PHYSICAL EXAM:  GENERAL: NAD  HEENT: Normocephalic;  conjunctivae and sclerae clear; moist mucous membranes;   NECK : supple  CHEST/LUNG: coarse b/l  HEART: S1 S2  regular; no murmurs,   ABDOMEN: Soft, Nontender, distended; Bowel sounds hypoactive   EXTREMITIES: no cyanosis; no edema; no calf tenderness. right nostril NGT to LWS  SKIN: warm and dry; no rash  NERVOUS SYSTEM:  Awake and alert; Oriented  to place, person and time ; no new deficits        Vital Signs Last 24 Hrs  T(C): 36.1 (26 Sep 2021 13:28), Max: 36.9 (25 Sep 2021 20:02)  T(F): 97 (26 Sep 2021 13:28), Max: 98.5 (25 Sep 2021 20:02)  HR: 87 (26 Sep 2021 13:28) (83 - 91)  BP: 108/54 (26 Sep 2021 13:28) (102/49 - 108/55)  BP(mean): --  RR: 19 (26 Sep 2021 13:28) (18 - 19)  SpO2: 95% (26 Sep 2021 13:28) (94% - 95%)                            8.9    7.45  )-----------( 248      ( 26 Sep 2021 07:23 )             26.9   09-26    141  |  109<H>  |  47<H>  ----------------------------<  144<H>  4.1   |  19<L>  |  3.83<H>    Ca    8.3<L>      26 Sep 2021 07:23    TPro  6.8  /  Alb  2.3<L>  /  TBili  0.4  /  DBili  x   /  AST  24  /  ALT  13  /  AlkPhos  95  09-26      Recommend/plan  Possible aspiration from vomiting. R/o obstruction   -NPO  -NGT to LWS  -urgent chest and abdomen xray  -GI Dr. Chua following This is a 76M from home, lives with daughter w/ PMH DM on insulin, HTN, HLD, CAD, 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 Pt with sharp pain on the R lateral foot.     Nurse at bedside informed of vomiting after ingestion of lactulose. Arrived at bedside, Pulse 80% on room air. Coarse breath sounds. Attending at bedside, placed NGT to LWS suction     PHYSICAL EXAM:  GENERAL: NAD  HEENT: Normocephalic;  conjunctivae and sclerae clear; moist mucous membranes;   NECK : supple  CHEST/LUNG: coarse b/l  HEART: S1 S2  regular; no murmurs,   ABDOMEN: Soft, Nontender, distended; Bowel sounds hypoactive   EXTREMITIES: no cyanosis; no edema; no calf tenderness. right nostril NGT to LWS  SKIN: warm and dry; no rash  NERVOUS SYSTEM:  Awake and alert; Oriented  to place, person and time ; no new deficits        Vital Signs Last 24 Hrs  T(C): 36.1 (26 Sep 2021 13:28), Max: 36.9 (25 Sep 2021 20:02)  T(F): 97 (26 Sep 2021 13:28), Max: 98.5 (25 Sep 2021 20:02)  HR: 87 (26 Sep 2021 13:28) (83 - 91)  BP: 108/54 (26 Sep 2021 13:28) (102/49 - 108/55)  BP(mean): --  RR: 19 (26 Sep 2021 13:28) (18 - 19)  SpO2: 95% (26 Sep 2021 13:28) (94% - 95%)                            8.9    7.45  )-----------( 248      ( 26 Sep 2021 07:23 )             26.9   09-26    141  |  109<H>  |  47<H>  ----------------------------<  144<H>  4.1   |  19<L>  |  3.83<H>    Ca    8.3<L>      26 Sep 2021 07:23    TPro  6.8  /  Alb  2.3<L>  /  TBili  0.4  /  DBili  x   /  AST  24  /  ALT  13  /  AlkPhos  95  09-26      Recommend/plan  Possible aspiration from vomiting. R/o obstruction   -NPO  -NGT to LWS  -urgent chest and abdomen xray  -GI General surgery consulted

## 2021-09-26 NOTE — PROGRESS NOTE ADULT - SUBJECTIVE AND OBJECTIVE BOX
S: Patient now reports not having BM for 2-3 days. Feels bloated. Patient given lactulose for constipation which he threw up and then had respiratory distress, improved on NRB. NGT placed without output.    O:  Vital Signs Last 24 Hrs  T(C): 36.1 (26 Sep 2021 13:28), Max: 36.9 (25 Sep 2021 20:02)  T(F): 97 (26 Sep 2021 13:28), Max: 98.5 (25 Sep 2021 20:02)  HR: 87 (26 Sep 2021 13:28) (83 - 91)  BP: 108/54 (26 Sep 2021 13:28) (102/49 - 108/55)  BP(mean): --  RR: 19 (26 Sep 2021 13:28) (18 - 19)  SpO2: 95% (26 Sep 2021 13:28) (94% - 95%)    GENERAL: NAD, well-developed  HEAD:  Atraumatic, Normocephalic  EYES: EOMI, PERRLA, conjunctiva and sclera clear  NECK: Supple, No JVD  CHEST/LUNG: Crackles on right lung  HEART: Regular rate and rhythm; No murmurs, rubs, or gallops  ABDOMEN: Soft, Nontender, tympanic, moderately distended; Bowel sounds hyperactive  EXTREMITIES:  2+ Peripheral Pulses, No clubbing, cyanosis, or edema  PSYCH: AAOx3, limited insight  NEUROLOGY: non-focal  SKIN: No rashes or lesions    ampicillin/sulbactam  IVPB 3 Gram(s) IV Intermittent every 12 hours  atorvastatin 80 milliGRAM(s) Oral at bedtime  bisacodyl Suppository 10 milliGRAM(s) Rectal once  cyanocobalamin 1000 MICROGram(s) Oral daily  ergocalciferol 54430 Unit(s) Oral <User Schedule>  ferrous    sulfate 325 milliGRAM(s) Oral daily  fluconAZOLE IVPB      fluconAZOLE IVPB 100 milliGRAM(s) IV Intermittent every 24 hours  heparin   Injectable 5000 Unit(s) SubCutaneous every 8 hours  HYDROmorphone  Injectable 0.5 milliGRAM(s) IV Push daily PRN  influenza   Vaccine 0.5 milliLiter(s) IntraMuscular once  insulin lispro (ADMELOG) corrective regimen sliding scale   SubCutaneous Before meals and at bedtime  lactulose Syrup 20 Gram(s) Oral once  levoFLOXacin  Tablet 250 milliGRAM(s) Oral every 48 hours  metoprolol succinate  milliGRAM(s) Oral daily  NIFEdipine XL 60 milliGRAM(s) Oral daily  ondansetron Injectable 4 milliGRAM(s) IV Push three times a day PRN  oxyCODONE    IR 5 milliGRAM(s) Oral every 4 hours PRN  polyethylene glycol 3350 17 Gram(s) Oral daily  senna 2 Tablet(s) Oral at bedtime  tamsulosin 0.4 milliGRAM(s) Oral at bedtime                            8.9    7.45  )-----------( 248      ( 26 Sep 2021 07:23 )             26.9       09-26    141  |  109<H>  |  47<H>  ----------------------------<  144<H>  4.1   |  19<L>  |  3.83<H>    Ca    8.3<L>      26 Sep 2021 07:23    TPro  6.8  /  Alb  2.3<L>  /  TBili  0.4  /  DBili  x   /  AST  24  /  ALT  13  /  AlkPhos  95  09-26

## 2021-09-26 NOTE — PROGRESS NOTE ADULT - SUBJECTIVE AND OBJECTIVE BOX
[facility] NEPHROLOGY- PROGRESS NOTE    Patient is a 75yo Male with DM on insulin, HTN, HLD, CAD, s/p R partial 5th ray amputation 2021 p/w R foot pain and foul drainage a/w diabetic foot ulcer suspicious for osteomyelitis. s/p OR debridement today. Nephrology consulted for abrupt rise in SCr.   s/p ibrahim placement for distended bladder on  with ~400ml of urine o/p    Hospital Medications: Medications reviewed.  REVIEW OF SYSTEMS:  CONSTITUTIONAL: No fevers or chills  RESPIRATORY: No shortness of breath  CARDIOVASCULAR: No chest pain.  GASTROINTESTINAL: No nausea or vomiting, Denies diarrhea or abdominal pain Last BM 1-2 days ago.   VASCULAR: No bilateral lower extremity edema. Rt foot - denies any current pain    VITALS:  T(F): 98 (21 @ 04:49), Max: 98.5 (21 @ 20:02)  HR: 91 (21 @ 04:49)  BP: 108/55 (21 @ 04:49)  RR: 19 (21 @ 04:49)  SpO2: 94% (21 @ 04:49)  Wt(kg): --     @ 07:01  -   @ 07:00  --------------------------------------------------------  IN: 1100 mL / OUT: 950 mL / NET: 150 mL      PHYSICAL EXAM:  Gen: NAD, calm  HEENT: MMM  Neck: no JVD  Cards: RRR, +S1/S2, +TRINIDAD  Resp: CTA B/L  GI: soft, NT/ mild distention  Extremities: no LE edema B/L  : +ibrahim  Derm: Rt foot wrapped/ wound vac    LABS:      141  |  109<H>  |  47<H>  ----------------------------<  144<H>  4.1   |  19<L>  |  3.83<H>    Ca    8.3<L>      26 Sep 2021 07:23    TPro  6.8  /  Alb  2.3<L>  /  TBili  0.4  /  DBili      /  AST  24  /  ALT  13  /  AlkPhos  95      Creatinine Trend: 3.83 <--, 4.05 <--, 3.97 <--, 3.20 <--, 2.52 <--, 2.38 <--, 1.42 <--, 1.48 <--                        8.9    7.45  )-----------( 248      ( 26 Sep 2021 07:23 )             26.9     Urine Studies:  Urinalysis Basic - ( 22 Sep 2021 16:14 )    Color: Yellow / Appearance: Slightly Turbid / S.020 / pH:   Gluc:  / Ketone: Trace  / Bili: Negative / Urobili: Negative   Blood:  / Protein: 30 mg/dL / Nitrite: Negative   Leuk Esterase: Small / RBC: 0-2 /HPF / WBC 3-5 /HPF   Sq Epi:  / Non Sq Epi: Moderate /HPF / Bacteria: Moderate /HPF      Creatinine, Random Urine: 131 mg/dL ( @ 16:14)  Chloride, Random Urine: 52 mmol/L ( @ 16:14)  Sodium, Random Urine: 53 mmol/L ( @ 16:14)

## 2021-09-26 NOTE — PROGRESS NOTE ADULT - SUBJECTIVE AND OBJECTIVE BOX
Patient is seen and examined at the bed side, is afebrile.  The kidney function started to improve slowly.       REVIEW OF SYSTEMS: All other review systems are negative      ALLERGIES: No Known Allergies      Vital Signs Last 24 Hrs  T(C): 36.1 (26 Sep 2021 13:28), Max: 36.9 (25 Sep 2021 20:02)  T(F): 97 (26 Sep 2021 13:28), Max: 98.5 (25 Sep 2021 20:02)  HR: 87 (26 Sep 2021 13:28) (83 - 91)  BP: 108/54 (26 Sep 2021 13:28) (102/49 - 108/55)  BP(mean): --  RR: 19 (26 Sep 2021 13:28) (18 - 19)  SpO2: 95% (26 Sep 2021 13:28) (94% - 95%)      PHYSICAL EXAM:  GENERAL: Not in distress   CHEST/LUNG:  Not using accessory muscles  HEART: s1 and s2 present  ABDOMEN:  Nontender and  Nondistended  EXTREMITIES: Right foot bandage in placed   CNS: Awake and Alert      LABS:                        8.9    7.45  )-----------( 248      ( 26 Sep 2021 07:23 )             26.9                            9.5    6.96  )-----------( 226      ( 25 Sep 2021 06:40 )             28.3       09-26    141  |  109<H>  |  47<H>  ----------------------------<  144<H>  4.1   |  19<L>  |  3.83<H>    Ca    8.3<L>      26 Sep 2021 07:23    TPro  6.8  /  Alb  2.3<L>  /  TBili  0.4  /  DBili  x   /  AST  24  /  ALT  13  /  AlkPhos  95  09-26         09-25    139  |  107  |  41<H>  ----------------------------<  134<H>  4.1   |  21<L>  |  4.05<H>    Ca    8.6      25 Sep 2021 06:40    TPro  6.6  /  Alb  2.3<L>  /  TBili  0.5  /  DBili  x   /  AST  25  /  ALT  15  /  AlkPhos  102  09-25 09-19    137  |  107  |  17  ----------------------------<  82  4.1   |  22  |  1.85<H>    Ca    9.1      19 Sep 2021 06:28    TPro  7.2  /  Alb  2.7<L>  /  TBili  0.6  /  DBili  x   /  AST  10  /  ALT  13  /  AlkPhos  97  09-19        CAPILLARY BLOOD GLUCOSE  POCT Blood Glucose.: 134 mg/dL (17 Sep 2021 17:11)  POCT Blood Glucose.: 128 mg/dL (17 Sep 2021 11:06)  POCT Blood Glucose.: 157 mg/dL (17 Sep 2021 04:10)      MEDICATIONS  (STANDING):    acetaminophen   Tablet .. 1000 milliGRAM(s) Oral every 8 hours  ampicillin/sulbactam  IVPB 3 Gram(s) IV Intermittent every 12 hours  atorvastatin 80 milliGRAM(s) Oral at bedtime  bisacodyl Suppository 10 milliGRAM(s) Rectal once  cyanocobalamin 1000 MICROGram(s) Oral daily  ergocalciferol 60567 Unit(s) Oral <User Schedule>  ferrous    sulfate 325 milliGRAM(s) Oral daily  fluconAZOLE IVPB      fluconAZOLE IVPB 100 milliGRAM(s) IV Intermittent every 24 hours  heparin   Injectable 5000 Unit(s) SubCutaneous every 8 hours  influenza   Vaccine 0.5 milliLiter(s) IntraMuscular once  insulin lispro (ADMELOG) corrective regimen sliding scale   SubCutaneous Before meals and at bedtime  levoFLOXacin  Tablet 250 milliGRAM(s) Oral every 48 hours  metoprolol succinate  milliGRAM(s) Oral daily  polyethylene glycol 3350 17 Gram(s) Oral daily  senna 2 Tablet(s) Oral at bedtime  sodium chloride 0.9%. 1000 milliLiter(s) (100 mL/Hr) IV Continuous <Continuous>  tamsulosin 0.4 milliGRAM(s) Oral at bedtime      RADIOLOGY & ADDITIONAL TESTS:    9/17/21 : MR Foot No Cont, Right (09.17.21 @ 13:04) : Resection at the mid aspect of the fifth metatarsal. Lateral soft tissue wound with marrow signal abnormality throughout the fifth metatarsal and within the adjacent fourth metatarsal head which is nonspecific in the setting of recent surgery although osteomyelitis is suspected.    9/16/21 : Xray Foot AP + Lateral + Oblique, Right (09.16.21 @ 15:03) : No acute finding. No plain film evidence of osteomyelitis.        MICROBIOLOGY DATA:      Urine Microscopic-Add On (NC) (09.22.21 @ 16:14)   Red Blood Cell - Urine: 0-2 /HPF   White Blood Cell - Urine: 3-5 /HPF   Bacteria: Moderate /HPF   Comment - Urine: yeast cells present   Epithelial Cells: Moderate /HPF     Culture - Tissue with Gram Stain (09.22.21 @ 13:54)   Gram Stain: No polymorphonuclear cells seen per low power field   No organisms seen per oil power field   Specimen Source: .Tissue Right 5th toe r/o osteomyeltis   Culture Results: No growth     COVID-19 David Domain Antibody (09.18.21 @ 12:55)   COVID-19 David Domain Antibody Result: >250.00:    Culture - Blood (09.17.21 @ 10:28)   Specimen Source: .Blood Blood-Peripheral   Culture Results: No growth to date.     Culture - Blood (09.17.21 @ 10:28)   Specimen Source: .Blood Blood-Venous   Culture Results: No growth to date.     Culture - Surgical Swab (09.16.21 @ 21:42)   - Amikacin: S <=16   - Amikacin: S <=16   - Amoxicillin/Clavulanic Acid: S <=8/4   - Ampicillin: R >16 These ampicillin results predict results for amoxicillin   - Ampicillin: S <=2 Predicts results to ampicillin/sulbactam, amoxacillin-clavulanate and piperacillin-tazobactam.   - Ampicillin/Sulbactam: S 8/4 Enterobacter, Citrobacter, and Serratia may develop resistance during prolonged therapy (3-4 days)   - Ampicillin/Sulbactam: R >16/8   - Aztreonam: S <=4   - Aztreonam: S <=4   - Cefazolin: R 16 Enterobacter, Citrobacter, and Serratia may develop resistance during prolonged therapy (3-4 days)   - Cefazolin: R >16   - Cefepime: S <=2   - Cefepime: S <=2   - Cefoxitin: S <=8   - Cefoxitin: S <=8   - Ceftazidime: S <=1   - Ceftriaxone: S <=1   - Ceftriaxone: S <=1 Enterobacter, Citrobacter, and Serratia may develop resistance during prolonged therapy   - Ciprofloxacin: S <=0.25   - Ciprofloxacin: S <=0.25   - Ertapenem: S <=0.5   - Gentamicin: S <=2   - Gentamicin: S <=2   - Imipenem: S <=1   - Levofloxacin: S <=0.5   - Levofloxacin: S <=0.5   - Meropenem: S <=1   - Meropenem: S <=1   - Piperacillin/Tazobactam: S <=8   - Piperacillin/Tazobactam: S <=8   - Tetra/Doxy: S 4   - Tobramycin: S <=2   - Trimethoprim/Sulfamethoxazole: S <=0.5/9.5   - Trimethoprim/Sulfamethoxazole: S <=0.5/9.5   - Vancomycin: S 2   Specimen Source: .Surgical Swab right foot wound   Culture Results:   Culture yields >4 types of aerobic and/or anaerobic bacteria   Call client services within 7 days if further workup is clinically   indicated. Culture includes   Rare Aeromonas hydrophila/caviae   Few Klebsiella oxytoca/Raoutella ornithinolytica   Few Enterococcus faecalis   Few Streptococcus agalactiae (Group B) isolated   Group B streptococci are susceptible to ampicillin,   penicillin and cefazolin, but may be resistant to   erythromycin and clindamycin.   Recommendations for intrapartum prophylaxis for Group B   streptococci are penicillin or ampicillin.   Organism Identification: Aeromonas hydrophila/caviae   Klebsiella oxytoca /Raoutella ornithinolytica   Enterococcus faecalis   Organism: Aeromonas hydrophila/caviae   Organism: Klebsiella oxytoca /Raoutella ornithinolytica   Organism: Enterococcus faecalis   COVID-19 PCR . (09.16.21 @ 22:24)   COVID-19 PCR: NotDetec:             Patient is seen and examined at the bed side, is afebrile.  The kidney function started to improve slowly. The events noted regarding vomiting after ingestion of lactulose. and was saturating 80% on room air, place don NRB and NGT to LWS suction. The abdominal Xray and CXR  has been reviewed.       REVIEW OF SYSTEMS: All other review systems are negative      ALLERGIES: No Known Allergies      Vital Signs Last 24 Hrs  T(C): 36.1 (26 Sep 2021 13:28), Max: 36.9 (25 Sep 2021 20:02)  T(F): 97 (26 Sep 2021 13:28), Max: 98.5 (25 Sep 2021 20:02)  HR: 87 (26 Sep 2021 13:28) (83 - 91)  BP: 108/54 (26 Sep 2021 13:28) (102/49 - 108/55)  BP(mean): --  RR: 19 (26 Sep 2021 13:28) (18 - 19)  SpO2: 95% (26 Sep 2021 13:28) (94% - 95%)      PHYSICAL EXAM:  GENERAL: Not in distress, on NRB  CHEST/LUNG:  Not using accessory muscles  HEART: s1 and s2 present  ABDOMEN:  Mild distended   EXTREMITIES: Right foot bandage in placed   CNS: Awake and Alert      LABS:                        8.9    7.45  )-----------( 248      ( 26 Sep 2021 07:23 )             26.9                            9.5    6.96  )-----------( 226      ( 25 Sep 2021 06:40 )             28.3       09-26    141  |  109<H>  |  47<H>  ----------------------------<  144<H>  4.1   |  19<L>  |  3.83<H>    Ca    8.3<L>      26 Sep 2021 07:23    TPro  6.8  /  Alb  2.3<L>  /  TBili  0.4  /  DBili  x   /  AST  24  /  ALT  13  /  AlkPhos  95  09-26 09-25    139  |  107  |  41<H>  ----------------------------<  134<H>  4.1   |  21<L>  |  4.05<H>    Ca    8.6      25 Sep 2021 06:40    TPro  6.6  /  Alb  2.3<L>  /  TBili  0.5  /  DBili  x   /  AST  25  /  ALT  15  /  AlkPhos  102  09-25 09-19    137  |  107  |  17  ----------------------------<  82  4.1   |  22  |  1.85<H>    Ca    9.1      19 Sep 2021 06:28    TPro  7.2  /  Alb  2.7<L>  /  TBili  0.6  /  DBili  x   /  AST  10  /  ALT  13  /  AlkPhos  97  09-19        CAPILLARY BLOOD GLUCOSE  POCT Blood Glucose.: 134 mg/dL (17 Sep 2021 17:11)  POCT Blood Glucose.: 128 mg/dL (17 Sep 2021 11:06)  POCT Blood Glucose.: 157 mg/dL (17 Sep 2021 04:10)      MEDICATIONS  (STANDING):    acetaminophen   Tablet .. 1000 milliGRAM(s) Oral every 8 hours  ampicillin/sulbactam  IVPB 3 Gram(s) IV Intermittent every 12 hours  atorvastatin 80 milliGRAM(s) Oral at bedtime  bisacodyl Suppository 10 milliGRAM(s) Rectal once  cyanocobalamin 1000 MICROGram(s) Oral daily  ergocalciferol 62693 Unit(s) Oral <User Schedule>  ferrous    sulfate 325 milliGRAM(s) Oral daily  fluconAZOLE IVPB      fluconAZOLE IVPB 100 milliGRAM(s) IV Intermittent every 24 hours  heparin   Injectable 5000 Unit(s) SubCutaneous every 8 hours  influenza   Vaccine 0.5 milliLiter(s) IntraMuscular once  insulin lispro (ADMELOG) corrective regimen sliding scale   SubCutaneous Before meals and at bedtime  levoFLOXacin  Tablet 250 milliGRAM(s) Oral every 48 hours  metoprolol succinate  milliGRAM(s) Oral daily  polyethylene glycol 3350 17 Gram(s) Oral daily  senna 2 Tablet(s) Oral at bedtime  sodium chloride 0.9%. 1000 milliLiter(s) (100 mL/Hr) IV Continuous <Continuous>  tamsulosin 0.4 milliGRAM(s) Oral at bedtime      RADIOLOGY & ADDITIONAL TESTS:    9/26/21: Xray Chest 1 View-PORTABLE IMMEDIATE (Xray Chest 1 View-PORTABLE IMMEDIATE .) (09.26.21 @ 15:28) : Small bilateral pleural effusions and mild pulmonary edema. A right lower lobe pneumonia is not excluded.      9/26/21: Xray Abdomen 1 View Portable, IMMEDIATE (Xray Abdomen 1 View Portable, IMMEDIATE .) (09.26.21 @ 15:28) : There is a nasogastric tube with its tip in the stomach. There is gaseous distention of the colon. No pathologic calcifications are seen. The osseous structures are intact with degenerative change is present in the spine.      9/17/21 : MR Foot No Cont, Right (09.17.21 @ 13:04) : Resection at the mid aspect of the fifth metatarsal. Lateral soft tissue wound with marrow signal abnormality throughout the fifth metatarsal and within the adjacent fourth metatarsal head which is nonspecific in the setting of recent surgery although osteomyelitis is suspected.    9/16/21 : Xray Foot AP + Lateral + Oblique, Right (09.16.21 @ 15:03) : No acute finding. No plain film evidence of osteomyelitis.        MICROBIOLOGY DATA:      Urine Microscopic-Add On (NC) (09.22.21 @ 16:14)   Red Blood Cell - Urine: 0-2 /HPF   White Blood Cell - Urine: 3-5 /HPF   Bacteria: Moderate /HPF   Comment - Urine: yeast cells present   Epithelial Cells: Moderate /HPF     Culture - Tissue with Gram Stain (09.22.21 @ 13:54)   Gram Stain: No polymorphonuclear cells seen per low power field   No organisms seen per oil power field   Specimen Source: .Tissue Right 5th toe r/o osteomyeltis   Culture Results: No growth     COVID-19 aDvid Domain Antibody (09.18.21 @ 12:55)   COVID-19 David Domain Antibody Result: >250.00:    Culture - Blood (09.17.21 @ 10:28)   Specimen Source: .Blood Blood-Peripheral   Culture Results: No growth to date.     Culture - Blood (09.17.21 @ 10:28)   Specimen Source: .Blood Blood-Venous   Culture Results: No growth to date.     Culture - Surgical Swab (09.16.21 @ 21:42)   - Amikacin: S <=16   - Amikacin: S <=16   - Amoxicillin/Clavulanic Acid: S <=8/4   - Ampicillin: R >16 These ampicillin results predict results for amoxicillin   - Ampicillin: S <=2 Predicts results to ampicillin/sulbactam, amoxacillin-clavulanate and piperacillin-tazobactam.   - Ampicillin/Sulbactam: S 8/4 Enterobacter, Citrobacter, and Serratia may develop resistance during prolonged therapy (3-4 days)   - Ampicillin/Sulbactam: R >16/8   - Aztreonam: S <=4   - Aztreonam: S <=4   - Cefazolin: R 16 Enterobacter, Citrobacter, and Serratia may develop resistance during prolonged therapy (3-4 days)   - Cefazolin: R >16   - Cefepime: S <=2   - Cefepime: S <=2   - Cefoxitin: S <=8   - Cefoxitin: S <=8   - Ceftazidime: S <=1   - Ceftriaxone: S <=1   - Ceftriaxone: S <=1 Enterobacter, Citrobacter, and Serratia may develop resistance during prolonged therapy   - Ciprofloxacin: S <=0.25   - Ciprofloxacin: S <=0.25   - Ertapenem: S <=0.5   - Gentamicin: S <=2   - Gentamicin: S <=2   - Imipenem: S <=1   - Levofloxacin: S <=0.5   - Levofloxacin: S <=0.5   - Meropenem: S <=1   - Meropenem: S <=1   - Piperacillin/Tazobactam: S <=8   - Piperacillin/Tazobactam: S <=8   - Tetra/Doxy: S 4   - Tobramycin: S <=2   - Trimethoprim/Sulfamethoxazole: S <=0.5/9.5   - Trimethoprim/Sulfamethoxazole: S <=0.5/9.5   - Vancomycin: S 2   Specimen Source: .Surgical Swab right foot wound   Culture Results:   Culture yields >4 types of aerobic and/or anaerobic bacteria   Call client services within 7 days if further workup is clinically   indicated. Culture includes   Rare Aeromonas hydrophila/caviae   Few Klebsiella oxytoca/Raoutella ornithinolytica   Few Enterococcus faecalis   Few Streptococcus agalactiae (Group B) isolated   Group B streptococci are susceptible to ampicillin,   penicillin and cefazolin, but may be resistant to   erythromycin and clindamycin.   Recommendations for intrapartum prophylaxis for Group B   streptococci are penicillin or ampicillin.   Organism Identification: Aeromonas hydrophila/caviae   Klebsiella oxytoca /Raoutella ornithinolytica   Enterococcus faecalis   Organism: Aeromonas hydrophila/caviae   Organism: Klebsiella oxytoca /Raoutella ornithinolytica   Organism: Enterococcus faecalis   COVID-19 PCR . (09.16.21 @ 22:24)   COVID-19 PCR: NotDetec:

## 2021-09-26 NOTE — PROGRESS NOTE ADULT - PROBLEM SELECTOR PLAN 7
CAD s/p CABG, severe aortic stenosis --> patient will need TAVR, SABIHA after infectious workup is complete  continue ASA, lipitor, BB, ACEi  EF 55-60%, GIDD   EKG S and ST abnormality, consider lateral ischemia.  Cardio: Dr. Wilkins  Family does not want to see cardiologist in [place] anymore due to distance, would prefer someone in [place], patient can possibly follow-up with Dr. Wilkins after discharge

## 2021-09-26 NOTE — PROGRESS NOTE ADULT - ASSESSMENT
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 s/p OR debridement today. Nephrology consulted for abrupt rise in SCr.     1. OREN- likely ATN in the setting of infection and ACEi use. Lisinopril discontinued 9/22. Renal function plateaued and now recovering. Monitor urine o/p for polyuria.   Kidney/ Bladder US with large distended bladder s/p ibrahim placement on 9/23; renal function did not improve post ibrahim; less likely due to obstructive uropathy. UA with 30 protein, trace blood with small LE, Check UCX to r/o UTI.   FeNa 0.72% and FeUrea 19.39%; consistent with pre-renal OREN. However renal function worsening on IVF. No peripheral eosinophilia- no signs of AIN. C3 & C4 wnl with neg ASO- no signs of PIGN. Strict I/Os. Avoid nephrotoxins/ NSAIDs/ RCA. Monitor BMP.  2. 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 Will defer secondary w/u as an outpt. Avoid nephrotoxins  3. HTN 2/2 CKD- BP low normal. Lisinopril d/c due to OREN. Nifedipine ER decreased to 60mg PO qd. Monitor BP  4. Acute osteomyelitis- s/p debridement 9/22. Pt on Unasyn/ Levaquin renally dosed. Plan as per ID      [facility] NEPHROLOG[facility]  Bryn Johnson M.D.  Domingo Booth D.O.  Zakia Rodriguez M.D.  Zonia Adams, MSN, ANP-C  [phone]    [address] [place] [address]

## 2021-09-26 NOTE — CONSULT NOTE ADULT - SUBJECTIVE AND OBJECTIVE BOX
General Surgery Consultation Note    Patient is a 76y old  Male who presents with a chief complaint of R Foot Pain (26 Sep 2021 15:37)      HPI:  76M from home, lives with daughter w/ PMH DM on insulin, HTN, HLD, CAD, 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 Pt with sharp pain on the R lateral foot. As per daughter, pt was taking tylenol which did not help his pain prompting the patient to ask his daughter to take him to the hospital. Pt is a poor historian. Daughter states that the patient did not see his podiatrist after the surgery. No fever, chest, pain, palpitations, nausea, vomiting, diarrhea (17 Sep 2021 01:11)    INTERVAL HPI:  77 yo M with PMh DM, HTN, HLD, CAD, PSH R partial 5th ray amputation admitted for R foot osteomyelitis. Surgery consulted for distention of large bowel.  Per team, pt not having bowel movement and abdomen was becoming distended.  Pt also was having vomiting.  Pt became SOB and was placed on nonrebreather mask.   He received a tap water enema prior to being seen.   Pt unable to provide much history as he appears frustrated.  However, he states he feels better after having BM. He denies abdominal pain.    PAST MEDICAL & SURGICAL HISTORY:  HTN (hypertension)    HLD (hyperlipidemia)    DM (diabetes mellitus)    CAD (coronary artery disease)    Glaucoma, angle-closure    [place] (hard of hearing)    S/P CABG (coronary artery bypass graft)    History of thyroid surgery        Allergies    No Known Allergies    Intolerances        MEDICATIONS  (STANDING):  ampicillin/sulbactam  IVPB 3 Gram(s) IV Intermittent every 12 hours  atorvastatin 80 milliGRAM(s) Oral at bedtime  bisacodyl Suppository 10 milliGRAM(s) Rectal once  cyanocobalamin 1000 MICROGram(s) Oral daily  ergocalciferol 64087 Unit(s) Oral <User Schedule>  ferrous    sulfate 325 milliGRAM(s) Oral daily  fluconAZOLE IVPB      fluconAZOLE IVPB 100 milliGRAM(s) IV Intermittent every 24 hours  heparin   Injectable 5000 Unit(s) SubCutaneous every 8 hours  influenza   Vaccine 0.5 milliLiter(s) IntraMuscular once  insulin lispro (ADMELOG) corrective regimen sliding scale   SubCutaneous Before meals and at bedtime  lactulose Syrup 20 Gram(s) Oral once  levoFLOXacin  Tablet 250 milliGRAM(s) Oral every 48 hours  metoprolol succinate  milliGRAM(s) Oral daily  NIFEdipine XL 60 milliGRAM(s) Oral daily  polyethylene glycol 3350 17 Gram(s) Oral daily  senna 2 Tablet(s) Oral at bedtime  tamsulosin 0.4 milliGRAM(s) Oral at bedtime    MEDICATIONS  (PRN):  HYDROmorphone  Injectable 0.5 milliGRAM(s) IV Push daily PRN Severe Pain (7 - 10)  ondansetron Injectable 4 milliGRAM(s) IV Push three times a day PRN Nausea and/or Vomiting  oxyCODONE    IR 5 milliGRAM(s) Oral every 4 hours PRN Moderate Pain (4 - 6)      Vital Signs Last 24 Hrs  T(C): 36.1 (26 Sep 2021 13:28), Max: 36.9 (25 Sep 2021 20:02)  T(F): 97 (26 Sep 2021 13:28), Max: 98.5 (25 Sep 2021 20:02)  HR: 87 (26 Sep 2021 13:28) (83 - 91)  BP: 108/54 (26 Sep 2021 13:28) (102/49 - 108/55)  BP(mean): --  RR: 19 (26 Sep 2021 13:28) (18 - 19)  SpO2: 95% (26 Sep 2021 13:28) (94% - 95%)    Physical Exam:  Gen: awake, alert oriented, mild respiratory distress  HEENT: anicteric  Abd: soft, distended, tympanic, not tender, no guarding or rigidity  Pelvic: Molina catheter in place with clear urine    Labs:                          8.9    7.45  )-----------( 248      ( 26 Sep 2021 07:23 )             26.9     09-26    141  |  109<H>  |  47<H>  ----------------------------<  144<H>  4.1   |  19<L>  |  3.83<H>    Ca    8.3<L>      26 Sep 2021 07:23    TPro  6.8  /  Alb  2.3<L>  /  TBili  0.4  /  DBili  x   /  AST  24  /  ALT  13  /  AlkPhos  95  09-26    Radiological Exams:  c< from: Xray Chest 1 View-PORTABLE IMMEDIATE (Xray Chest 1 View-PORTABLE IMMEDIATE .) (09.26.21 @ 15:28) >    EXAM:  XR CHEST PORTABLE IMMED 1V                            PROCEDURE DATE:  09/26/2021          INTERPRETATION:  Indication: Hypoxia.    TECHNIQUE: Single portable view of the chest.    COMPARISON: 1/14/2021    FINDINGS: The cardiac silhouette isnormal in size. The patient is status post median sternotomy. There are small bilateral pleural effusions and mild pulmonary edema. A right lower lobe pneumonia cannot be excluded.    IMPRESSION: Small bilateral pleural effusions and mild pulmonary edema. A right lower lobe pneumonia is not excluded.    < end of copied text >  < from: Xray Abdomen 1 View Portable, IMMEDIATE (Xray Abdomen 1 View Portable, IMMEDIATE .) (09.26.21 @ 15:28) >    EXAM:  XR ABDOMEN PORTABLE IMMED 1V                            PROCEDURE DATE:  09/26/2021          INTERPRETATION:  Indication: Vomiting.    TECHNIQUE: 2 portable views of the abdomen.    COMPARISON: 9/25/2021    FINDINGS: There is a nasogastric tube with its tip in the stomach. There is gaseous distention of the colon. No pathologic calcifications are seen. The osseous structures are intact with degenerative change is present in the spine.    IMPRESSION: Gaseous distention of the colon. If clinically warranted, further evaluation may be obtained with CT scan.      < end of copied text >

## 2021-09-26 NOTE — CONSULT NOTE ADULT - ASSESSMENT
Large bowel distention of unknown cause. Not currently obstructed  1. Recommend CT abd/pelvis  2. GI consult  3. Will follow  4. D/w Dr. Sagastume and agreed

## 2021-09-27 DIAGNOSIS — I26.99 OTHER PULMONARY EMBOLISM WITHOUT ACUTE COR PULMONALE: ICD-10-CM

## 2021-09-27 DIAGNOSIS — J81.1 CHRONIC PULMONARY EDEMA: ICD-10-CM

## 2021-09-27 LAB
ALBUMIN SERPL ELPH-MCNC: 2.4 G/DL — LOW (ref 3.5–5)
ALP SERPL-CCNC: 103 U/L — SIGNIFICANT CHANGE UP (ref 40–120)
ALT FLD-CCNC: 14 U/L DA — SIGNIFICANT CHANGE UP (ref 10–60)
ANION GAP SERPL CALC-SCNC: 12 MMOL/L — SIGNIFICANT CHANGE UP (ref 5–17)
AST SERPL-CCNC: 20 U/L — SIGNIFICANT CHANGE UP (ref 10–40)
BASE EXCESS BLDA CALC-SCNC: -1.5 MMOL/L — SIGNIFICANT CHANGE UP (ref -2–3)
BILIRUB SERPL-MCNC: 0.4 MG/DL — SIGNIFICANT CHANGE UP (ref 0.2–1.2)
BLOOD GAS COMMENTS ARTERIAL: SIGNIFICANT CHANGE UP
BUN SERPL-MCNC: 49 MG/DL — HIGH (ref 7–18)
CALCIUM SERPL-MCNC: 8.5 MG/DL — SIGNIFICANT CHANGE UP (ref 8.4–10.5)
CHLORIDE SERPL-SCNC: 109 MMOL/L — HIGH (ref 96–108)
CO2 SERPL-SCNC: 21 MMOL/L — LOW (ref 22–31)
CREAT SERPL-MCNC: 3.19 MG/DL — HIGH (ref 0.5–1.3)
CULTURE RESULTS: SIGNIFICANT CHANGE UP
GLUCOSE BLDC GLUCOMTR-MCNC: 140 MG/DL — HIGH (ref 70–99)
GLUCOSE BLDC GLUCOMTR-MCNC: 151 MG/DL — HIGH (ref 70–99)
GLUCOSE BLDC GLUCOMTR-MCNC: 161 MG/DL — HIGH (ref 70–99)
GLUCOSE BLDC GLUCOMTR-MCNC: 170 MG/DL — HIGH (ref 70–99)
GLUCOSE SERPL-MCNC: 158 MG/DL — HIGH (ref 70–99)
HCO3 BLDA-SCNC: 21 MMOL/L — SIGNIFICANT CHANGE UP (ref 21–28)
HCT VFR BLD CALC: 29.2 % — LOW (ref 39–50)
HGB BLD-MCNC: 9.6 G/DL — LOW (ref 13–17)
HOROWITZ INDEX BLDA+IHG-RTO: 100 — SIGNIFICANT CHANGE UP
LACTATE SERPL-SCNC: 1 MMOL/L — SIGNIFICANT CHANGE UP (ref 0.7–2)
MAGNESIUM SERPL-MCNC: 2.4 MG/DL — SIGNIFICANT CHANGE UP (ref 1.6–2.6)
MCHC RBC-ENTMCNC: 29.2 PG — SIGNIFICANT CHANGE UP (ref 27–34)
MCHC RBC-ENTMCNC: 32.9 GM/DL — SIGNIFICANT CHANGE UP (ref 32–36)
MCV RBC AUTO: 88.8 FL — SIGNIFICANT CHANGE UP (ref 80–100)
NRBC # BLD: 0 /100 WBCS — SIGNIFICANT CHANGE UP (ref 0–0)
NT-PROBNP SERPL-SCNC: HIGH PG/ML (ref 0–450)
PCO2 BLDA: 30 MMHG — LOW (ref 35–48)
PH BLDA: 7.46 — HIGH (ref 7.35–7.45)
PLATELET # BLD AUTO: 302 K/UL — SIGNIFICANT CHANGE UP (ref 150–400)
PO2 BLDA: 81 MMHG — LOW (ref 83–108)
POTASSIUM SERPL-MCNC: 3.9 MMOL/L — SIGNIFICANT CHANGE UP (ref 3.5–5.3)
POTASSIUM SERPL-SCNC: 3.9 MMOL/L — SIGNIFICANT CHANGE UP (ref 3.5–5.3)
PROT SERPL-MCNC: 7.1 G/DL — SIGNIFICANT CHANGE UP (ref 6–8.3)
RBC # BLD: 3.29 M/UL — LOW (ref 4.2–5.8)
RBC # FLD: 13.8 % — SIGNIFICANT CHANGE UP (ref 10.3–14.5)
SAO2 % BLDA: 98 % — SIGNIFICANT CHANGE UP
SODIUM SERPL-SCNC: 142 MMOL/L — SIGNIFICANT CHANGE UP (ref 135–145)
SPECIMEN SOURCE: SIGNIFICANT CHANGE UP
SURGICAL PATHOLOGY STUDY: SIGNIFICANT CHANGE UP
TROPONIN I SERPL-MCNC: 1.22 NG/ML — HIGH (ref 0–0.04)
TROPONIN I SERPL-MCNC: 1.46 NG/ML — HIGH (ref 0–0.04)
WBC # BLD: 9.09 K/UL — SIGNIFICANT CHANGE UP (ref 3.8–10.5)
WBC # FLD AUTO: 9.09 K/UL — SIGNIFICANT CHANGE UP (ref 3.8–10.5)

## 2021-09-27 PROCEDURE — 93010 ELECTROCARDIOGRAM REPORT: CPT

## 2021-09-27 PROCEDURE — 99231 SBSQ HOSP IP/OBS SF/LOW 25: CPT

## 2021-09-27 PROCEDURE — 71045 X-RAY EXAM CHEST 1 VIEW: CPT | Mod: 26

## 2021-09-27 PROCEDURE — 99232 SBSQ HOSP IP/OBS MODERATE 35: CPT

## 2021-09-27 PROCEDURE — 74176 CT ABD & PELVIS W/O CONTRAST: CPT | Mod: 26

## 2021-09-27 PROCEDURE — 74018 RADEX ABDOMEN 1 VIEW: CPT | Mod: 26

## 2021-09-27 RX ORDER — FUROSEMIDE 40 MG
80 TABLET ORAL ONCE
Refills: 0 | Status: COMPLETED | OUTPATIENT
Start: 2021-09-27 | End: 2021-09-27

## 2021-09-27 RX ORDER — IOHEXOL 300 MG/ML
30 INJECTION, SOLUTION INTRAVENOUS ONCE
Refills: 0 | Status: COMPLETED | OUTPATIENT
Start: 2021-09-27 | End: 2021-09-27

## 2021-09-27 RX ADMIN — FLUCONAZOLE 100 MILLIGRAM(S): 150 TABLET ORAL at 19:01

## 2021-09-27 RX ADMIN — HEPARIN SODIUM 5000 UNIT(S): 5000 INJECTION INTRAVENOUS; SUBCUTANEOUS at 21:22

## 2021-09-27 RX ADMIN — Medication 1: at 17:15

## 2021-09-27 RX ADMIN — AMPICILLIN SODIUM AND SULBACTAM SODIUM 200 GRAM(S): 250; 125 INJECTION, POWDER, FOR SUSPENSION INTRAMUSCULAR; INTRAVENOUS at 05:12

## 2021-09-27 RX ADMIN — Medication 10 MILLIGRAM(S): at 10:06

## 2021-09-27 RX ADMIN — TAMSULOSIN HYDROCHLORIDE 0.4 MILLIGRAM(S): 0.4 CAPSULE ORAL at 21:21

## 2021-09-27 RX ADMIN — ATORVASTATIN CALCIUM 80 MILLIGRAM(S): 80 TABLET, FILM COATED ORAL at 21:21

## 2021-09-27 RX ADMIN — Medication 1: at 08:22

## 2021-09-27 RX ADMIN — HEPARIN SODIUM 5000 UNIT(S): 5000 INJECTION INTRAVENOUS; SUBCUTANEOUS at 15:15

## 2021-09-27 RX ADMIN — SENNA PLUS 2 TABLET(S): 8.6 TABLET ORAL at 21:21

## 2021-09-27 RX ADMIN — Medication 80 MILLIGRAM(S): at 10:04

## 2021-09-27 RX ADMIN — HEPARIN SODIUM 5000 UNIT(S): 5000 INJECTION INTRAVENOUS; SUBCUTANEOUS at 05:13

## 2021-09-27 RX ADMIN — AMPICILLIN SODIUM AND SULBACTAM SODIUM 200 GRAM(S): 250; 125 INJECTION, POWDER, FOR SUSPENSION INTRAMUSCULAR; INTRAVENOUS at 17:13

## 2021-09-27 RX ADMIN — Medication 1: at 21:22

## 2021-09-27 NOTE — PROGRESS NOTE ADULT - SUBJECTIVE AND OBJECTIVE BOX
[facility] NEPHROLOGY- PROGRESS NOTE    Patient is a 75yo Male with DM on insulin, HTN, HLD, CAD, s/p R partial 5th ray amputation 2021 p/w R foot pain and foul drainage a/w diabetic foot ulcer suspicious for osteomyelitis. s/p OR debridement today. Nephrology consulted for abrupt rise in SCr.   s/p ibrahim placement for distended bladder on  with ~400ml of urine o/p  - pt with SOB; CXR with pulmonary edema- pt given Lasix 80mg IV x1. Concern for aspiration PNA    Hospital Medications: Medications reviewed.  REVIEW OF SYSTEMS:  CONSTITUTIONAL: No fevers or chills  RESPIRATORY: +shortness of breath improved  CARDIOVASCULAR: No chest pain.  GASTROINTESTINAL: No nausea or vomiting, +diarrhea today. Denies abdominal pain    VASCULAR: No bilateral lower extremity edema. Rt foot - denies any current pain    VITALS:  T(F): 98.1 (21 @ 18:30), Max: 98.7 (21 @ 13:37)  HR: 103 (21 @ 18:30)  BP: 114/60 (21 @ 18:30)  RR: 19 (21 @ 18:30)  SpO2: 92% (21 @ 18:30)  Wt(kg): --     @ 07:  -   @ 07:00  --------------------------------------------------------  IN: 300 mL / OUT: 700 mL / NET: -400 mL     @ 07:01  -   @ 18:54  --------------------------------------------------------  IN: 0 mL / OUT: 500 mL / NET: -500 mL      PHYSICAL EXAM:  Gen: NAD, calm  HEENT: MMM  Neck: no JVD  Cards: RRR, +S1/S2, +TRINIDAD  Resp: +mild rales at bases  GI: soft, NT/ mild distention  Extremities: no LE edema B/L  : +ibrahim  Derm: Rt foot wrapped/ wound vac    LABS:      142  |  109<H>  |  49<H>  ----------------------------<  158<H>  3.9   |  21<L>  |  3.19<H>    Ca    8.5      27 Sep 2021 06:17  Mg     2.4         TPro  7.1  /  Alb  2.4<L>  /  TBili  0.4  /  DBili      /  AST  20  /  ALT  14  /  AlkPhos  103      Creatinine Trend: 3.19 <--, 3.83 <--, 4.05 <--, 3.97 <--, 3.20 <--, 2.52 <--, 2.38 <--, 1.42 <--                        9.6    9.09  )-----------( 302      ( 27 Sep 2021 06:17 )             29.2     Urine Studies:  Urinalysis Basic - ( 22 Sep 2021 16:14 )    Color: Yellow / Appearance: Slightly Turbid / S.020 / pH:   Gluc:  / Ketone: Trace  / Bili: Negative / Urobili: Negative   Blood:  / Protein: 30 mg/dL / Nitrite: Negative   Leuk Esterase: Small / RBC: 0-2 /HPF / WBC 3-5 /HPF   Sq Epi:  / Non Sq Epi: Moderate /HPF / Bacteria: Moderate /HPF      Creatinine, Random Urine: 131 mg/dL ( @ 16:14)  Chloride, Random Urine: 52 mmol/L ( @ 16:14)  Sodium, Random Urine: 53 mmol/L ( @ 16:14)

## 2021-09-27 NOTE — CONSULT NOTE ADULT - ATTENDING COMMENTS
76 year old male with past medical history of poorly controlled IDDM, HTN, HLD, stage III CKD, CAD s/p CABG and recent right partial 5th ray amputation (8/19/21) admitted and managed for right foot osteomyelitis. Course of admission complicated by Constipation and abdominal distention, aspiration and fluid overload. ICU is consulted for respiratory distress     Assessment:  Acute hypoxic respiratory failure   Pulmonary edema  Severe Aortic Stenosis   OREN on CKD  Large bowel Distention   Osteomyelitis  DM2  CAD    Plan  - Patient s/p lasix IVP this morning, at time of evaluation noted to be comfortable on NC oxygen with saturation in the high 90s.   - CXR suggestive of pulmonary edema  - Cont to aim for euvolemic volume status with diuresis  - Oxygen support to maintain saturation > 92%  - Cardiology follow up regarding Aortic stenosis  - Consider monitoring on telemetry  - Antibiotics as per primary team  - Glucose monitoring  - At this time patient is comfortable on NC post diuresis. Cont. to maintain a euvolemic volume status.   - Goals of care discussion to establish advance directives   - Discussed with bedside team and Dr. Samaniego as well. 76 year old male with past medical history of poorly controlled IDDM, HTN, HLD, stage III CKD, CAD s/p CABG and recent right partial 5th ray amputation (8/19/21) admitted and managed for right foot osteomyelitis. Course of admission complicated by Constipation and abdominal distention, aspiration and fluid overload. ICU is consulted for respiratory distress     Assessment:  Acute hypoxic respiratory failure   Pulmonary edema  Severe Aortic Stenosis   OREN on CKD  Large bowel Distention   Osteomyelitis of right foot  DM2  CAD    Plan  - Patient s/p lasix IVP this morning, at time of evaluation noted to be comfortable on NC oxygen with saturation in the high 90s.   - CXR suggestive of pulmonary edema  - Cont to aim for euvolemic volume status with diuresis  - Oxygen support to maintain saturation > 92%  - Cardiology follow up regarding Aortic stenosis  - Consider monitoring on telemetry  - Antibiotics as per primary team  - Glucose monitoring  - At this time patient is comfortable on NC post diuresis. Cont. to maintain a euvolemic volume status.   - Goals of care discussion to establish advance directives   - Discussed with bedside team and Dr. Samaniego as well.

## 2021-09-27 NOTE — PROGRESS NOTE ADULT - ASSESSMENT
76 year old male with past medical history of poorly controlled IDDM, HTN, HLD, stage III CKD, CAD s/p CABG and recent right partial 5th ray amputation (8/19/21) who presented to ED with c/o right foot pain associated with discharge and foul odor. He tried taking Tylenol for the pain with minimal relief. Of note, the patient never followed up with podiatry after his procedure in August. MRI confirms suspected osteomyelitis - patient followed by podiatry and ID, patient undergo debridement and wound VAC placement. Patient currently on course of Unasyn and Levofloxacin, ID to follow up final surgical path. 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 Nephrology Dr. Rodriguez following. Hospital course complicated by abdominal distention and constipation for which he received lactulose and vomited. The patient had subsequent respiratory distress and it is suspected he aspirated as CXR showed possible RLL PNA. AXR shows distended loops of bowel for which NG tube was placed which the patient self d/c'd overnight 9/26 - re-attempts to replace NG tube were unsuccessful as patient was non-cooperative. Surgery following. CT abdomen deferred for now as patient is unstable and will not tolerate lying flat. ICU consulted given patient's deterioration in clinical status.        76 year old male with past medical history of poorly controlled IDDM, HTN, HLD, stage III CKD, CAD s/p CABG and recent right partial 5th ray amputation (8/19/21) who presented to ED with c/o right foot pain associated with discharge and foul odor. He tried taking Tylenol for the pain with minimal relief. Of note, the patient never followed up with podiatry after his procedure in August. MRI confirms suspected osteomyelitis - patient followed by podiatry and ID, patient undergo debridement and wound VAC placement. Patient currently on course of Unasyn and Levofloxacin, ID to follow up final surgical path. 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 Nephrology Dr. Rodriguez following. Hospital course complicated by abdominal distention and constipation for which he received lactulose and vomited. The patient had subsequent respiratory distress and it is suspected he aspirated as CXR showed possible RLL PNA. AXR shows distended loops of bowel for which NG tube was placed which the patient self d/c'd overnight 9/26 - re-attempts to replace NG tube were unsuccessful as patient was non-cooperative. Surgery following. CT abdomen deferred for now as patient is unstable and will not tolerate lying flat. ICU consulted given patient's deterioration in clinical status.       --- acute pulmonary edema, troponin elevated-- likely demand ischemia, BNP > 20,000 : transfer to telemetry

## 2021-09-27 NOTE — PROGRESS NOTE ADULT - PROBLEM SELECTOR PLAN 5
OREN superimposed on Stage III CKD  concern for AIN 2/2 antibiotics   Creatinine down-trending  hold off on IVF as CXR with pulmonary edema  avoid nephrotoxic agents, dose as per GFR  monitor creatinine, electrolytes  holding Lisinopril, Nifedipine incr. 90 mg daily   f/u C3/ C4; ASO negative  Urine eosinophils negative  renal ultrasound negative for hydronephrosis, hyperechoic kidneys significant for medical renal disease  UO improving, Cr mildly improving  Strict I&O, if Cr improving will perform TOV  Nephrology Dr. Rodriguez consulted

## 2021-09-27 NOTE — PROGRESS NOTE ADULT - PROBLEM SELECTOR PLAN 4
patient not voiding for last 24 hours  possible cause of ARF  RBUS with hypoechoic kidneys and 500 mL bladder  s/p ibrahim catheter   c/w flomax  monitor UO and Cr, if improving may attempt TOV in a few days  Nephrology dr. Rodriguez

## 2021-09-27 NOTE — CONSULT NOTE ADULT - SUBJECTIVE AND OBJECTIVE BOX
Patient is a 76y old  Male who presents with a chief complaint of R Foot Pain (27 Sep 2021 10:49)      Initial HPI on admission:  HPI:  76M from home, lives with daughter w/ PMH DM on insulin, HTN, HLD, CAD, 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 Pt with sharp pain on the R lateral foot. As per daughter, pt was taking tylenol which did not help his pain prompting the patient to ask his daughter to take him to the hospital. Pt is a poor historian. Daughter states that the patient did not see his podiatrist after the surgery. No fever, chest, pain, palpitations, nausea, vomiting, diarrhea (17 Sep 2021 01:11)      BRIEF HOSPITAL COURSE: 76 year old male with past medical history of poorly controlled IDDM, HTN, HLD, stage III CKD, CAD s/p CABG and recent right partial 5th ray amputation (8/19/21) who presented to ED with c/o right foot pain associated with discharge and foul odor. He tried taking Tylenol for the pain with minimal relief. Of note, the patient never followed up with podiatry after his procedure in August. MRI confirmed suspected osteomyelitis - patient followed by podiatry and ID, patient underwent debridement and wound VAC placement. Patient currently on course of Unasyn and Levofloxacin, ID to follow up final surgical path. 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 Nephrology Dr. Conner following. Hospital course complicated by abdominal distention and constipation for which he received lactulose and vomited. The patient had subsequent respiratory distress and it is suspected he aspirated as CXR showed possible RLL PNA. AXR shows distended loops of bowel for which NG tube was placed which the patient self d/c'd overnight 9/26 - re-attempts to replace NG tube were unsuccessful as patient was non-cooperative. Surgery following. CT abdomen deferred for now as patient is unstable and will not tolerate lying flat. CXR on 9/27 shows B/l interstitial infiltrates concerning for fluid overload Lasix IV given . ICU consulted given patient's deterioration in clinical status.       PAST MEDICAL & SURGICAL HISTORY:  HTN (hypertension)    HLD (hyperlipidemia)    DM (diabetes mellitus)    CAD (coronary artery disease)    Glaucoma, angle-closure    [place] (hard of hearing)    S/P CABG (coronary artery bypass graft)    History of thyroid surgery      Allergies    No Known Allergies    Intolerances      FAMILY HISTORY:  Family history of acute myocardial infarction (Father)    Family history of diabetes mellitus (Mother, Sibling)    Family history of hypertension (Mother, Sibling)    Family history of hyperlipidemia (Mother, Sibling)            Medications:  ampicillin/sulbactam  IVPB 3 Gram(s) IV Intermittent every 12 hours  atorvastatin 80 milliGRAM(s) Oral at bedtime  bisacodyl Suppository 10 milliGRAM(s) Rectal once  cyanocobalamin 1000 MICROGram(s) Oral daily  ergocalciferol 67328 Unit(s) Oral <User Schedule>  ferrous    sulfate 325 milliGRAM(s) Oral daily  fluconAZOLE IVPB      fluconAZOLE IVPB 100 milliGRAM(s) IV Intermittent every 24 hours  heparin   Injectable 5000 Unit(s) SubCutaneous every 8 hours  HYDROmorphone  Injectable 0.5 milliGRAM(s) IV Push daily PRN  influenza   Vaccine 0.5 milliLiter(s) IntraMuscular once  insulin lispro (ADMELOG) corrective regimen sliding scale   SubCutaneous Before meals and at bedtime  levoFLOXacin  Tablet 250 milliGRAM(s) Oral every 48 hours  metoprolol succinate  milliGRAM(s) Oral daily  NIFEdipine XL 60 milliGRAM(s) Oral daily  ondansetron Injectable 4 milliGRAM(s) IV Push three times a day PRN  oxyCODONE    IR 5 milliGRAM(s) Oral every 4 hours PRN  polyethylene glycol 3350 17 Gram(s) Oral daily  senna 2 Tablet(s) Oral at bedtime  tamsulosin 0.4 milliGRAM(s) Oral at bedtime      vent settings      Vital Signs Last 24 Hrs  T(C): 36.1 (27 Sep 2021 04:55), Max: 36.7 (26 Sep 2021 20:09)  T(F): 97 (27 Sep 2021 04:55), Max: 98 (26 Sep 2021 20:09)  HR: 89 (27 Sep 2021 04:55) (87 - 95)  BP: 120/63 (27 Sep 2021 04:55) (108/54 - 126/59)  BP(mean): --  RR: 19 (27 Sep 2021 04:55) (19 - 20)  SpO2: 98% (27 Sep 2021 04:55) (87% - 98%)    ABG - ( 27 Sep 2021 10:19 )  pH, Arterial: 7.46  pH, Blood: x     /  pCO2: 30    /  pO2: 81    / HCO3: 21    / Base Excess: -1.5  /  SaO2: 98                    09-26 @ 07:01  -  09-27 @ 07:00  --------------------------------------------------------  IN: 300 mL / OUT: 700 mL / NET: -400 mL          LABS:                        9.6    9.09  )-----------( 302      ( 27 Sep 2021 06:17 )             29.2     09-27    142  |  109<H>  |  49<H>  ----------------------------<  158<H>  3.9   |  21<L>  |  3.19<H>    Ca    8.5      27 Sep 2021 06:17    TPro  7.1  /  Alb  2.4<L>  /  TBili  0.4  /  DBili  x   /  AST  20  /  ALT  14  /  AlkPhos  103  09-27          CAPILLARY BLOOD GLUCOSE      POCT Blood Glucose.: 151 mg/dL (27 Sep 2021 08:19)        CULTURES:  Culture Results:   No growth at 5 days (09-22 @ 13:54)        Physical Examination:    PHYSICAL EXAM:  GENERAL: NAD, speaks in full sentences, no signs of respiratory distress  HEAD:  Atraumatic, Normocephalic  EYES: EOMI, PERRLA, conjunctiva and sclera clear  NECK: Supple, No JVD  CHEST/LUNG: B/L crackles and Ronchi; No accessory muscles used  HEART: Regular rate and rhythm; No murmurs;   ABDOMEN: Soft, Nontender, distended; Bowel sounds present; No guarding  EXTREMITIES: right foot wound and dressing as below, Left foot 1+ pulse   PSYCH: AAOx3  NEUROLOGY: non-focal  SKIN: 5th digit amputation on right foot, noted. DTI noted to heels b/l. Echar developing ath the wound site       RADIOLOGY REVIEWED CXR and abd/Xray reviewed             ASSESSMENT AND PLAN: 76 year old male with past medical history of poorly controlled IDDM, HTN, HLD, stage III CKD, CAD s/p CABG and recent right partial 5th ray amputation (8/19/21) admitted and managed for right foot osteomyelitis. Course of admission complicated by Constipation and abdominal distention, aspiration and fluid overload. ICU is consulted for respiratory distress     Acute hypoxic respiratory failure 2/2 fluid overload  Aspiration pneumonia   Severe Aortic Stenosis   OREN on CKD  Large bowel Distention   Osteomyelitis  DM2      Neuro  AAOx 3 at [facility],   Denies any complains at present     Cardiovascular-  Acute hypoxic respiratory failure 2/2 fluid overload  Increase in O2 demand likely due to fluid overload maintaining airway   ABG 7.46/30/60  Patient has severe Aortic stenosis which contributed to fluid overload  Echo from pervious admission shows EF 55-60%  s/p 80mg IV Lasix, good urine output and improvement in respiratory status, O2 demand came down to 2L  Continue to monitor     #HTN  continue Procardia and Toprol with holding parameters       Pulmonary  #Aspiration pneumonia   Covered with Levaquin for aspiration pneumonia  Initial CXR showed Right lobe infiltrate  WBC normal, no fevers   Patient has cough       Infections  Management for aspiration pneumonia as above   Levaquin and fluconazole for osteomyelitis     Nephro  #OREN on CKD   Patient has underlying CKD secondary to diabetes   worsening kidney function likely due to fluid overload   Cr 3.18, BUN 49  S/p lasix 80mg   f/u BMP closely   Nephro Dr conner onboard       Gastrointestinal  NPO for aspiration   Will recommend getting GI evaluation     #Large bowel Obstruction   AXR shows dilated large bowel loops ,  f/u CT abd  Abd soft distended no guarding noted   risk of perforation, pt needs NG tube (pulled it out initially)  Avoid opioids as much as possible   f/u surgery       Heme  Monitor HnH     Endocrine  HSS Q6 while he is NPo      Skin  #OSteomyelitis   Continue ABx for OM, podiatry and ID onboard     Prophylaxis   Heparin SQ     Prognosis:   Guarded   Patient is FULL code, will benefit from palliative consult regarding his risk factors (Severe As ) and impending need of surgery      Patient is a 76y old  Male who presents with a chief complaint of R Foot Pain (27 Sep 2021 10:49)      Initial HPI on admission:  HPI:  76M from home, lives with daughter w/ PMH DM on insulin, HTN, HLD, CAD, 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 Pt with sharp pain on the R lateral foot. As per daughter, pt was taking tylenol which did not help his pain prompting the patient to ask his daughter to take him to the hospital. Pt is a poor historian. Daughter states that the patient did not see his podiatrist after the surgery. No fever, chest, pain, palpitations, nausea, vomiting, diarrhea (17 Sep 2021 01:11)      BRIEF HOSPITAL COURSE: 76 year old male with past medical history of poorly controlled IDDM, HTN, HLD, stage III CKD, CAD s/p CABG and recent right partial 5th ray amputation (8/19/21) who presented to ED with c/o right foot pain associated with discharge and foul odor. He tried taking Tylenol for the pain with minimal relief. Of note, the patient never followed up with podiatry after his procedure in August. MRI confirmed suspected osteomyelitis - patient followed by podiatry and ID, patient underwent debridement and wound VAC placement. Patient currently on course of Unasyn and Levofloxacin, ID to follow up final surgical path. 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 Nephrology Dr. Conner following. Hospital course complicated by abdominal distention and constipation for which he received lactulose and vomited. The patient had subsequent respiratory distress and it is suspected he aspirated as CXR showed possible RLL PNA. AXR shows distended loops of bowel for which NG tube was placed which the patient self d/c'd overnight 9/26 - re-attempts to replace NG tube were unsuccessful as patient was non-cooperative. Surgery following. CT abdomen deferred for now as patient is unstable and will not tolerate lying flat. CXR on 9/27 shows B/l interstitial infiltrates concerning for fluid overload Lasix IV given . ICU consulted given patient's deterioration in clinical status.       PAST MEDICAL & SURGICAL HISTORY:  HTN (hypertension)    HLD (hyperlipidemia)    DM (diabetes mellitus)    CAD (coronary artery disease)    Glaucoma, angle-closure    [place] (hard of hearing)    S/P CABG (coronary artery bypass graft)    History of thyroid surgery      Allergies    No Known Allergies    Intolerances      FAMILY HISTORY:  Family history of acute myocardial infarction (Father)    Family history of diabetes mellitus (Mother, Sibling)    Family history of hypertension (Mother, Sibling)    Family history of hyperlipidemia (Mother, Sibling)    Social History:  Lives at home  Denies alcohol intake, smoking, and illicit drug use (17 Sep 2021 01:11)        Medications:  ampicillin/sulbactam  IVPB 3 Gram(s) IV Intermittent every 12 hours  atorvastatin 80 milliGRAM(s) Oral at bedtime  bisacodyl Suppository 10 milliGRAM(s) Rectal once  cyanocobalamin 1000 MICROGram(s) Oral daily  ergocalciferol 39827 Unit(s) Oral <User Schedule>  ferrous    sulfate 325 milliGRAM(s) Oral daily  fluconAZOLE IVPB      fluconAZOLE IVPB 100 milliGRAM(s) IV Intermittent every 24 hours  heparin   Injectable 5000 Unit(s) SubCutaneous every 8 hours  HYDROmorphone  Injectable 0.5 milliGRAM(s) IV Push daily PRN  influenza   Vaccine 0.5 milliLiter(s) IntraMuscular once  insulin lispro (ADMELOG) corrective regimen sliding scale   SubCutaneous Before meals and at bedtime  levoFLOXacin  Tablet 250 milliGRAM(s) Oral every 48 hours  metoprolol succinate  milliGRAM(s) Oral daily  NIFEdipine XL 60 milliGRAM(s) Oral daily  ondansetron Injectable 4 milliGRAM(s) IV Push three times a day PRN  oxyCODONE    IR 5 milliGRAM(s) Oral every 4 hours PRN  polyethylene glycol 3350 17 Gram(s) Oral daily  senna 2 Tablet(s) Oral at bedtime  tamsulosin 0.4 milliGRAM(s) Oral at bedtime      vent settings      Vital Signs Last 24 Hrs  T(C): 36.1 (27 Sep 2021 04:55), Max: 36.7 (26 Sep 2021 20:09)  T(F): 97 (27 Sep 2021 04:55), Max: 98 (26 Sep 2021 20:09)  HR: 89 (27 Sep 2021 04:55) (87 - 95)  BP: 120/63 (27 Sep 2021 04:55) (108/54 - 126/59)  BP(mean): --  RR: 19 (27 Sep 2021 04:55) (19 - 20)  SpO2: 98% (27 Sep 2021 04:55) (87% - 98%)    ABG - ( 27 Sep 2021 10:19 )  pH, Arterial: 7.46  pH, Blood: x     /  pCO2: 30    /  pO2: 81    / HCO3: 21    / Base Excess: -1.5  /  SaO2: 98                    09-26 @ 07:01  -  09-27 @ 07:00  --------------------------------------------------------  IN: 300 mL / OUT: 700 mL / NET: -400 mL          LABS:                        9.6    9.09  )-----------( 302      ( 27 Sep 2021 06:17 )             29.2     09-27    142  |  109<H>  |  49<H>  ----------------------------<  158<H>  3.9   |  21<L>  |  3.19<H>    Ca    8.5      27 Sep 2021 06:17    TPro  7.1  /  Alb  2.4<L>  /  TBili  0.4  /  DBili  x   /  AST  20  /  ALT  14  /  AlkPhos  103  09-27          CAPILLARY BLOOD GLUCOSE      POCT Blood Glucose.: 151 mg/dL (27 Sep 2021 08:19)        CULTURES:  Culture Results:   No growth at 5 days (09-22 @ 13:54)    REVIEW OF SYSTEM: Negative except for above    Physical Examination:    PHYSICAL EXAM:  GENERAL: NAD, speaks in full sentences, no signs of respiratory distress  HEAD:  Atraumatic, Normocephalic  EYES: EOMI, PERRLA, conjunctiva and sclera clear  NECK: Supple, No JVD  CHEST/LUNG: B/L crackles and Ronchi; No accessory muscles used  HEART: Regular rate and rhythm; No murmurs;   ABDOMEN: Soft, Nontender, distended; Bowel sounds present; No guarding  EXTREMITIES: right foot wound and dressing as below, Left foot 1+ pulse   PSYCH: AAOx3  NEUROLOGY: non-focal  SKIN: 5th digit amputation on right foot, noted. DTI noted to heels b/l. Echar developing ath the wound site       RADIOLOGY REVIEWED CXR and abd/Xray reviewed             ASSESSMENT AND PLAN: 76 year old male with past medical history of poorly controlled IDDM, HTN, HLD, stage III CKD, CAD s/p CABG and recent right partial 5th ray amputation (8/19/21) admitted and managed for right foot osteomyelitis. Course of admission complicated by Constipation and abdominal distention, aspiration and fluid overload. ICU is consulted for respiratory distress     Acute hypoxic respiratory failure 2/2 fluid overload  Aspiration pneumonia   Severe Aortic Stenosis   OREN on CKD  Large bowel Distention   Osteomyelitis  DM2      Neuro  AAOx 3 at [facility],   Denies any complains at present     Cardiovascular-  Acute hypoxic respiratory failure 2/2 fluid overload  Increase in O2 demand likely due to fluid overload maintaining airway   ABG 7.46/30/60  Patient has severe Aortic stenosis which contributed to fluid overload  Echo from pervious admission shows EF 55-60%  s/p 80mg IV Lasix, good urine output and improvement in respiratory status, O2 demand came down to 2L  Continue to monitor     #HTN  continue Procardia and Toprol with holding parameters       Pulmonary  #Aspiration pneumonia   Covered with Levaquin for aspiration pneumonia  Initial CXR showed Right lobe infiltrate  WBC normal, no fevers   Patient has cough       Infections  Management for aspiration pneumonia as above   Levaquin and fluconazole for osteomyelitis     Nephro  #OREN on CKD   Patient has underlying CKD secondary to diabetes   worsening kidney function likely due to fluid overload   Cr 3.18, BUN 49  S/p lasix 80mg   f/u BMP closely   Nephro Dr conner onboard       Gastrointestinal  NPO for aspiration   Will recommend getting GI evaluation     #Large bowel Obstruction   AXR shows dilated large bowel loops ,  f/u CT abd  Abd soft distended no guarding noted   risk of perforation, pt needs NG tube (pulled it out initially)  Avoid opioids as much as possible   f/u surgery       Heme  Monitor HnH     Endocrine  HSS Q6 while he is NPo      Skin  #OSteomyelitis   Continue ABx for OM, podiatry and ID onboard     Prophylaxis   Heparin SQ     Prognosis:   Guarded   Patient is FULL code, will benefit from palliative consult regarding his risk factors (Severe As ) and impending need of surgery

## 2021-09-27 NOTE — PROGRESS NOTE ADULT - SUBJECTIVE AND OBJECTIVE BOX
INTERVAL HPI/OVERNIGHT EVENTS:  Patient seen and examined at bedside.   On NRB. Admits to some nausea, denies vomiting, denies abd pain   NPO  +F, BM yesterday, soft    MEDICATIONS  (STANDING):  ampicillin/sulbactam  IVPB 3 Gram(s) IV Intermittent every 12 hours  atorvastatin 80 milliGRAM(s) Oral at bedtime  bisacodyl Suppository 10 milliGRAM(s) Rectal once  cyanocobalamin 1000 MICROGram(s) Oral daily  ergocalciferol 21552 Unit(s) Oral <User Schedule>  ferrous    sulfate 325 milliGRAM(s) Oral daily  fluconAZOLE IVPB      fluconAZOLE IVPB 100 milliGRAM(s) IV Intermittent every 24 hours  heparin   Injectable 5000 Unit(s) SubCutaneous every 8 hours  influenza   Vaccine 0.5 milliLiter(s) IntraMuscular once  insulin lispro (ADMELOG) corrective regimen sliding scale   SubCutaneous Before meals and at bedtime  levoFLOXacin  Tablet 250 milliGRAM(s) Oral every 48 hours  metoprolol succinate  milliGRAM(s) Oral daily  NIFEdipine XL 60 milliGRAM(s) Oral daily  polyethylene glycol 3350 17 Gram(s) Oral daily  senna 2 Tablet(s) Oral at bedtime  tamsulosin 0.4 milliGRAM(s) Oral at bedtime    MEDICATIONS  (PRN):  HYDROmorphone  Injectable 0.5 milliGRAM(s) IV Push daily PRN Severe Pain (7 - 10)  ondansetron Injectable 4 milliGRAM(s) IV Push three times a day PRN Nausea and/or Vomiting  oxyCODONE    IR 5 milliGRAM(s) Oral every 4 hours PRN Moderate Pain (4 - 6)      Vital Signs Last 24 Hrs  T(C): 36.1 (27 Sep 2021 04:55), Max: 36.7 (26 Sep 2021 20:09)  T(F): 97 (27 Sep 2021 04:55), Max: 98 (26 Sep 2021 20:09)  HR: 89 (27 Sep 2021 04:55) (87 - 95)  BP: 120/63 (27 Sep 2021 04:55) (108/54 - 126/59)  BP(mean): --  RR: 19 (27 Sep 2021 04:55) (19 - 20)  SpO2: 98% (27 Sep 2021 04:55) (87% - 98%)    Physical:  General: NAD  Respirations: On NRB, mild distress   Abdomen: Soft, +distended, nontender, no rebound, no guarding, no peritonitis     I&O's Detail    26 Sep 2021 07:01  -  27 Sep 2021 07:00  --------------------------------------------------------  IN:    sodium chloride 0.9%: 300 mL  Total IN: 300 mL    OUT:    Indwelling Catheter - Urethral (mL): 700 mL  Total OUT: 700 mL    Total NET: -400 mL          LABS:                        9.6    9.09  )-----------( 302      ( 27 Sep 2021 06:17 )             29.2             09-27    142  |  109<H>  |  49<H>  ----------------------------<  158<H>  3.9   |  21<L>  |  3.19<H>    Ca    8.5      27 Sep 2021 06:17    TPro  7.1  /  Alb  2.4<L>  /  TBili  0.4  /  DBili  x   /  AST  20  /  ALT  14  /  AlkPhos  103  09-27

## 2021-09-27 NOTE — PROGRESS NOTE ADULT - SUBJECTIVE AND OBJECTIVE BOX
Podiatry Interval HPI: Patient was unable to give a thorough history today. Pt has mittens on. Wound  vac was intact, removed vac, evaluted wound and reapplied vac. Heel DTI noted and dorsal wound forming.     Podiatry HPI: 76 year old male with a PMHx of DM, HTN, HLD, CAD to ED presents to the ED for a Right Foot s/p 5th foot partial ray resection and fillet toe flap. Patient states that he has sharp localized non-radiating pain to the Right foot 5th metatarsal. States that after his surgery, he did not see a podiatrist and he came into the ED because he saw drainage and was having pain. Denies any constitutional symptoms of N/V/C/F/SOB. Denies any other pedal complaints at this time. Denies calf pain today, bilaterally.    Medications ampicillin/sulbactam  IVPB 3 Gram(s) IV Intermittent every 12 hours  atorvastatin 80 milliGRAM(s) Oral at bedtime  bisacodyl Suppository 10 milliGRAM(s) Rectal once  cyanocobalamin 1000 MICROGram(s) Oral daily  ergocalciferol 75562 Unit(s) Oral <User Schedule>  ferrous    sulfate 325 milliGRAM(s) Oral daily  fluconAZOLE IVPB      fluconAZOLE IVPB 100 milliGRAM(s) IV Intermittent every 24 hours  heparin   Injectable 5000 Unit(s) SubCutaneous every 8 hours  HYDROmorphone  Injectable 0.5 milliGRAM(s) IV Push daily PRN  influenza   Vaccine 0.5 milliLiter(s) IntraMuscular once  insulin lispro (ADMELOG) corrective regimen sliding scale   SubCutaneous Before meals and at bedtime  levoFLOXacin  Tablet 250 milliGRAM(s) Oral every 48 hours  metoprolol succinate  milliGRAM(s) Oral daily  NIFEdipine XL 60 milliGRAM(s) Oral daily  ondansetron Injectable 4 milliGRAM(s) IV Push three times a day PRN  oxyCODONE    IR 5 milliGRAM(s) Oral every 4 hours PRN  polyethylene glycol 3350 17 Gram(s) Oral daily  senna 2 Tablet(s) Oral at bedtime  tamsulosin 0.4 milliGRAM(s) Oral at bedtime    FHFamily history of acute myocardial infarction (Father)    Family history of diabetes mellitus (Mother, Sibling)    Family history of hypertension (Mother, Sibling)    Family history of hyperlipidemia (Mother, Sibling)    ,   PMHHTN (hypertension)    HLD (hyperlipidemia)    DM (diabetes mellitus)    CAD (coronary artery disease)    Glaucoma, angle-closure    [place] (hard of hearing)       PSHNo significant past surgical history    S/P CABG (coronary artery bypass graft)    History of thyroid surgery        Labs                          9.6    9.09  )-----------( 302      ( 27 Sep 2021 06:17 )             29.2      09-27    142  |  109<H>  |  49<H>  ----------------------------<  158<H>  3.9   |  21<L>  |  3.19<H>    Ca    8.5      27 Sep 2021 06:17    TPro  7.1  /  Alb  2.4<L>  /  TBili  0.4  /  DBili  x   /  AST  20  /  ALT  14  /  AlkPhos  103  09-27     Vital Signs Last 24 Hrs  T(C): 36.1 (27 Sep 2021 04:55), Max: 36.7 (26 Sep 2021 20:09)  T(F): 97 (27 Sep 2021 04:55), Max: 98 (26 Sep 2021 20:09)  HR: 89 (27 Sep 2021 04:55) (87 - 95)  BP: 120/63 (27 Sep 2021 04:55) (108/54 - 126/59)  BP(mean): --  RR: 19 (27 Sep 2021 04:55) (19 - 20)  SpO2: 98% (27 Sep 2021 04:55) (87% - 98%)  Sedimentation Rate, Erythrocyte: 77 mm/Hr (09-16-21 @ 16:03)  Sedimentation Rate, Erythrocyte: 91 mm/Hr (08-22-21 @ 15:41)         C-Reactive Protein, Serum: 45 mg/L (09-16-21 @ 23:33)  C-Reactive Protein, Serum: 85 mg/L (08-22-21 @ 21:30)  C-Reactive Protein, Serum: 67 mg/L (08-15-21 @ 11:55)   WBC Count: 9.09 K/uL (09-27-21 @ 06:17)    PHYSICAL EXAM (Unable to conduct a thorough physical examination today, physical examination per last time)  LE Focused:    Vasc:  DP/PT pulses were faintly palpable, bilateral. DP/PT pulses were monophasic on doppler, bilaterally. CFT<3 seconds to all digits.   Derm: Surgical with graft in place, granular wound bed through the graft, minimal drainage or discharge. minimal erythema and edema noted, eschar forming over the wound. Dorsal foot wound noted- mild erythema and edema noted.   Neuro: Protective sensation diminished, b/l  MSK: 4/5 muscle strength noted to the pedal compartments. 5th digit amputation on right foot, noted. DTI noted to heels b/l.     Imaging:  INTERPRETATION:  Postop, rule out infection.    3 views right foot. Prior 8/20/2021.     Status post resection of the fifth toe and distal fifth metatarsal unchanged in appearance. No focal bone lysis or unusual periosteal reaction to suggest osteomyelitis. No acute fracture or dislocation. The remainder study unchanged. No soft tissue gas.    IMPRESSION: No acute finding. No plain film evidence of osteomyelitis.        MRI R foot:     INTERPRETATION:  Clinical Information: Recent fifth metatarsal head resection now with fifth digit pain, swelling and foul discharge.    Comparison: Radiographs of the right foot from 9/16/2021 and MRI the right foot from 8/16/2021.    Technique:  MRI of the right midfoot and forefoot.  Intravenous Contrast: None.    Findings:    There is a resection at the mid aspect of the fifth metatarsal shaft. There is a lateral soft tissue wound beginning at the level of the amputation which extends distally. There is susceptibility artifact in the region of the amputation consistent with postoperative change. There is hyperintense T2 marrow signal throughout the remaining fifth metatarsal and within the adjacent fourth metatarsal head which is nonspecific and could be related to recent postoperative changes although osteomyelitis is suspected.    There is edema and mild atrophy within the plantar muscles of the foot. There is minimal spurring at the first metatarsophalangeal and hallux sesamoid articulations.    Impression:  Resection at the mid aspect of the fifth metatarsal. Lateral soft tissue wound with marrow signal abnormality throughout the fifth metatarsal and within the adjacent fourth metatarsal head which is nonspecific in the setting of recent surgery although osteomyelitis is suspected.      Culture Results:   Rare Aeromonas hydrophila/caviae   Few Klebsiella oxytoca/Raoutella ornithinolytica   Few Enterococcus faecalis   Few Streptococcus agalactiae (Group B) isolated   Group B streptococci are susceptible to ampicillin,   penicillin and cefazolin, but may be resistant to   erythromycin and clindamycin.   Recommendations for intrapartum prophylaxis for Group B   streptococci are penicillin or ampicillin. (09.16.21 @ 21:42)     Gram Stain:   No polymorphonuclear cells seen per low power field   No organisms seen per oil power field (09.22.21 @ 13:54)       Historical Values  Gram Stain:   No polymorphonuclear cells seen per low power field   No organisms seen per oil power field (09.22.21 @ 13:54)   Gram Stain:   No polymorphonuclear leukocytes seen per low power field   No organisms seen per oil power field (08.20.21 @ 03:59)

## 2021-09-27 NOTE — PROGRESS NOTE ADULT - PROBLEM SELECTOR PLAN 9
PT for evaluation for safe discharge planning  wound VAC in place   f/u surgical path  IV anti-biotics if no clear margins on bone culture, will discuss with renal if can place PICC PT for evaluation for safe discharge planning  wound VAC in place   f/u surgical path

## 2021-09-27 NOTE — PROGRESS NOTE ADULT - PROBLEM SELECTOR PLAN 2
patient had reflux with lactulose and likely aspirated  CXR with consolidation on RLL  already covered on Levaquin and Unasyn  monitor respiratory status  NPO, give meds through NGT when patient can tolerate placement

## 2021-09-27 NOTE — PROGRESS NOTE ADULT - PROBLEM SELECTOR PLAN 7
CAD s/p CABG, severe aortic stenosis --> patient will need TAVR, SABIHA after infectious workup is complete  continue ASA, lipitor, BB, ACEi  EF 55-60%, GIDD   EKG S and ST abnormality, consider lateral ischemia.  Cardio: Dr. Wilkins  Family does not want to see cardiologist in [place] anymore due to distance, would prefer someone in [place], patient can possibly follow-up with Dr. Wilkins after discharge CAD s/p CABG, severe aortic stenosis --> patient will need TAVR, SABIHA after infectious workup is complete  continue ASA, lipitor, BB, ACEi  EF 55-60%, GIDD   Cardio: Dr. Wilkins

## 2021-09-27 NOTE — PROGRESS NOTE ADULT - SUBJECTIVE AND OBJECTIVE BOX
S: Patient self discontinued NG tube overnight and had oxygen desaturations to the low 80's requiring non-rebreather. Attempts to replace NGT unsuccessful as patient could not tolerate     O:  Vital Signs Last 24 Hrs  T(C): 36.1 (26 Sep 2021 13:28), Max: 36.9 (25 Sep 2021 20:02)  T(F): 97 (26 Sep 2021 13:28), Max: 98.5 (25 Sep 2021 20:02)  HR: 87 (26 Sep 2021 13:28) (83 - 91)  BP: 108/54 (26 Sep 2021 13:28) (102/49 - 108/55)  BP(mean): --  RR: 19 (26 Sep 2021 13:28) (18 - 19)  SpO2: 95% (26 Sep 2021 13:28) (94% - 95%)    GENERAL: NAD, well-developed  HEAD:  Atraumatic, Normocephalic  EYES: EOMI, PERRLA, conjunctiva and sclera clear  NECK: Supple, No JVD  CHEST/LUNG: bilateral rhonchi  HEART: III/VI systolic ejection murmur   ABDOMEN: distended abdomen with hypoactive bowel sounds   EXTREMITIES: + wound VAC to RLE   PSYCH: AAOx3, agitated, non-cooperative   NEUROLOGY: non-focal  SKIN: No rashes or lesions    ampicillin/sulbactam  IVPB 3 Gram(s) IV Intermittent every 12 hours  atorvastatin 80 milliGRAM(s) Oral at bedtime  bisacodyl Suppository 10 milliGRAM(s) Rectal once  cyanocobalamin 1000 MICROGram(s) Oral daily  ergocalciferol 69211 Unit(s) Oral <User Schedule>  ferrous    sulfate 325 milliGRAM(s) Oral daily  fluconAZOLE IVPB      fluconAZOLE IVPB 100 milliGRAM(s) IV Intermittent every 24 hours  heparin   Injectable 5000 Unit(s) SubCutaneous every 8 hours  HYDROmorphone  Injectable 0.5 milliGRAM(s) IV Push daily PRN  influenza   Vaccine 0.5 milliLiter(s) IntraMuscular once  insulin lispro (ADMELOG) corrective regimen sliding scale   SubCutaneous Before meals and at bedtime  lactulose Syrup 20 Gram(s) Oral once  levoFLOXacin  Tablet 250 milliGRAM(s) Oral every 48 hours  metoprolol succinate  milliGRAM(s) Oral daily  NIFEdipine XL 60 milliGRAM(s) Oral daily  ondansetron Injectable 4 milliGRAM(s) IV Push three times a day PRN  oxyCODONE    IR 5 milliGRAM(s) Oral every 4 hours PRN  polyethylene glycol 3350 17 Gram(s) Oral daily  senna 2 Tablet(s) Oral at bedtime  tamsulosin 0.4 milliGRAM(s) Oral at bedtime                            8.9    7.45  )-----------( 248      ( 26 Sep 2021 07:23 )             26.9       09-26    141  |  109<H>  |  47<H>  ----------------------------<  144<H>  4.1   |  19<L>  |  3.83<H>    Ca    8.3<L>      26 Sep 2021 07:23    TPro  6.8  /  Alb  2.3<L>  /  TBili  0.4  /  DBili  x   /  AST  24  /  ALT  13  /  AlkPhos  95  09-26    < from: Xray Abdomen 1 View Portable, IMMEDIATE (Xray Abdomen 1 View Portable, IMMEDIATE .) (09.26.21 @ 15:28) >  IMPRESSION: Gaseous distention of the colon. If clinically warranted, further evaluation may be obtained with CT scan.

## 2021-09-27 NOTE — PROGRESS NOTE ADULT - ASSESSMENT
77 y/o male with large bowel distention     -F/u CT abd/pelvis   -F/u GI recs   -Surgery will follow  -Continue care as per primary team   -Will discuss with Dr. Sagastume  77 y/o male with large bowel distention     -F/u CT abd/pelvis with PO contrast  -F/u GI recs   -Surgery will follow  -Continue care as per primary team   -Will discuss with Dr. Sagastume

## 2021-09-27 NOTE — PROGRESS NOTE ADULT - SUBJECTIVE AND OBJECTIVE BOX
Patient is seen and examined at the bed side, is afebrile.  The kidney function is improving slowly. The events noted regarding patient being agitated, currently       REVIEW OF SYSTEMS: All other review systems are negative      ALLERGIES: No Known Allergies      Vital Signs Last 24 Hrs  T(C): 37.1 (27 Sep 2021 13:37), Max: 37.1 (27 Sep 2021 13:37)  T(F): 98.7 (27 Sep 2021 13:37), Max: 98.7 (27 Sep 2021 13:37)  HR: 94 (27 Sep 2021 13:37) (88 - 98)  BP: 131/59 (27 Sep 2021 13:37) (117/54 - 131/59)  BP(mean): --  RR: 18 (27 Sep 2021 13:37) (18 - 20)  SpO2: 94% (27 Sep 2021 13:37) (90% - 98%)      PHYSICAL EXAM:  GENERAL: Not in distress, on NRB  CHEST/LUNG:  Not using accessory muscles  HEART: s1 and s2 present  ABDOMEN:  Mild distended   EXTREMITIES: Right foot bandage in placed   CNS: Awake and Alert      LABS:                                   9.6    9.09  )-----------( 302      ( 27 Sep 2021 06:17 )             29.2                8.9    7.45  )-----------( 248      ( 26 Sep 2021 07:23 )             26.9       09-27    142  |  109<H>  |  49<H>  ----------------------------<  158<H>  3.9   |  21<L>  |  3.19<H>    Ca    8.5      27 Sep 2021 06:17  Mg     2.4     09-27    TPro  7.1  /  Alb  2.4<L>  /  TBili  0.4  /  DBili  x   /  AST  20  /  ALT  14  /  AlkPhos  103  09-27 09-25    139  |  107  |  41<H>  ----------------------------<  134<H>  4.1   |  21<L>  |  4.05<H>    Ca    8.6      25 Sep 2021 06:40    TPro  6.6  /  Alb  2.3<L>  /  TBili  0.5  /  DBili  x   /  AST  25  /  ALT  15  /  AlkPhos  102  09-25 09-19    137  |  107  |  17  ----------------------------<  82  4.1   |  22  |  1.85<H>    Ca    9.1      19 Sep 2021 06:28    TPro  7.2  /  Alb  2.7<L>  /  TBili  0.6  /  DBili  x   /  AST  10  /  ALT  13  /  AlkPhos  97  09-19        CAPILLARY BLOOD GLUCOSE  POCT Blood Glucose.: 134 mg/dL (17 Sep 2021 17:11)  POCT Blood Glucose.: 128 mg/dL (17 Sep 2021 11:06)  POCT Blood Glucose.: 157 mg/dL (17 Sep 2021 04:10)      MEDICATIONS  (STANDING):  ampicillin/sulbactam  IVPB 3 Gram(s) IV Intermittent every 12 hours  atorvastatin 80 milliGRAM(s) Oral at bedtime  bisacodyl Suppository 10 milliGRAM(s) Rectal once  cyanocobalamin 1000 MICROGram(s) Oral daily  ergocalciferol 99746 Unit(s) Oral <User Schedule>  ferrous    sulfate 325 milliGRAM(s) Oral daily  fluconAZOLE IVPB      fluconAZOLE IVPB 100 milliGRAM(s) IV Intermittent every 24 hours  heparin   Injectable 5000 Unit(s) SubCutaneous every 8 hours  influenza   Vaccine 0.5 milliLiter(s) IntraMuscular once  insulin lispro (ADMELOG) corrective regimen sliding scale   SubCutaneous Before meals and at bedtime  levoFLOXacin  Tablet 250 milliGRAM(s) Oral every 48 hours  metoprolol succinate  milliGRAM(s) Oral daily  NIFEdipine XL 60 milliGRAM(s) Oral daily  polyethylene glycol 3350 17 Gram(s) Oral daily  senna 2 Tablet(s) Oral at bedtime  tamsulosin 0.4 milliGRAM(s) Oral at bedtime      RADIOLOGY & ADDITIONAL TESTS:    9/26/21: Xray Chest 1 View-PORTABLE IMMEDIATE (Xray Chest 1 View-PORTABLE IMMEDIATE .) (09.26.21 @ 15:28) : Small bilateral pleural effusions and mild pulmonary edema. A right lower lobe pneumonia is not excluded.      9/26/21: Xray Abdomen 1 View Portable, IMMEDIATE (Xray Abdomen 1 View Portable, IMMEDIATE .) (09.26.21 @ 15:28) : There is a nasogastric tube with its tip in the stomach. There is gaseous distention of the colon. No pathologic calcifications are seen. The osseous structures are intact with degenerative change is present in the spine.      9/17/21 : MR Foot No Cont, Right (09.17.21 @ 13:04) : Resection at the mid aspect of the fifth metatarsal. Lateral soft tissue wound with marrow signal abnormality throughout the fifth metatarsal and within the adjacent fourth metatarsal head which is nonspecific in the setting of recent surgery although osteomyelitis is suspected.    9/16/21 : Xray Foot AP + Lateral + Oblique, Right (09.16.21 @ 15:03) : No acute finding. No plain film evidence of osteomyelitis.        MICROBIOLOGY DATA:      Urine Microscopic-Add On (NC) (09.22.21 @ 16:14)   Red Blood Cell - Urine: 0-2 /HPF   White Blood Cell - Urine: 3-5 /HPF   Bacteria: Moderate /HPF   Comment - Urine: yeast cells present   Epithelial Cells: Moderate /HPF     Culture - Tissue with Gram Stain (09.22.21 @ 13:54)   Gram Stain: No polymorphonuclear cells seen per low power field   No organisms seen per oil power field   Specimen Source: .Tissue Right 5th toe r/o osteomyeltis   Culture Results: No growth     COVID-19 David Domain Antibody (09.18.21 @ 12:55)   COVID-19 David Domain Antibody Result: >250.00:    Culture - Blood (09.17.21 @ 10:28)   Specimen Source: .Blood Blood-Peripheral   Culture Results: No growth to date.     Culture - Blood (09.17.21 @ 10:28)   Specimen Source: .Blood Blood-Venous   Culture Results: No growth to date.     Culture - Surgical Swab (09.16.21 @ 21:42)   - Amikacin: S <=16   - Amikacin: S <=16   - Amoxicillin/Clavulanic Acid: S <=8/4   - Ampicillin: R >16 These ampicillin results predict results for amoxicillin   - Ampicillin: S <=2 Predicts results to ampicillin/sulbactam, amoxacillin-clavulanate and piperacillin-tazobactam.   - Ampicillin/Sulbactam: S 8/4 Enterobacter, Citrobacter, and Serratia may develop resistance during prolonged therapy (3-4 days)   - Ampicillin/Sulbactam: R >16/8   - Aztreonam: S <=4   - Aztreonam: S <=4   - Cefazolin: R 16 Enterobacter, Citrobacter, and Serratia may develop resistance during prolonged therapy (3-4 days)   - Cefazolin: R >16   - Cefepime: S <=2   - Cefepime: S <=2   - Cefoxitin: S <=8   - Cefoxitin: S <=8   - Ceftazidime: S <=1   - Ceftriaxone: S <=1   - Ceftriaxone: S <=1 Enterobacter, Citrobacter, and Serratia may develop resistance during prolonged therapy   - Ciprofloxacin: S <=0.25   - Ciprofloxacin: S <=0.25   - Ertapenem: S <=0.5   - Gentamicin: S <=2   - Gentamicin: S <=2   - Imipenem: S <=1   - Levofloxacin: S <=0.5   - Levofloxacin: S <=0.5   - Meropenem: S <=1   - Meropenem: S <=1   - Piperacillin/Tazobactam: S <=8   - Piperacillin/Tazobactam: S <=8   - Tetra/Doxy: S 4   - Tobramycin: S <=2   - Trimethoprim/Sulfamethoxazole: S <=0.5/9.5   - Trimethoprim/Sulfamethoxazole: S <=0.5/9.5   - Vancomycin: S 2   Specimen Source: .Surgical Swab right foot wound   Culture Results:   Culture yields >4 types of aerobic and/or anaerobic bacteria   Call client services within 7 days if further workup is clinically   indicated. Culture includes   Rare Aeromonas hydrophila/caviae   Few Klebsiella oxytoca/Raoutella ornithinolytica   Few Enterococcus faecalis   Few Streptococcus agalactiae (Group B) isolated   Group B streptococci are susceptible to ampicillin,   penicillin and cefazolin, but may be resistant to   erythromycin and clindamycin.   Recommendations for intrapartum prophylaxis for Group B   streptococci are penicillin or ampicillin.   Organism Identification: Aeromonas hydrophila/caviae   Klebsiella oxytoca /Raoutella ornithinolytica   Enterococcus faecalis   Organism: Aeromonas hydrophila/caviae   Organism: Klebsiella oxytoca /Raoutella ornithinolytica   Organism: Enterococcus faecalis   COVID-19 PCR . (09.16.21 @ 22:24)   COVID-19 PCR: NotDetec:             Patient is seen and examined at the bed side, is afebrile.  The kidney function is improving slowly. The events noted regarding patient being agitated, currently calm and transferred to [address] [place] [address] for ? cardiac monitoring.       REVIEW OF SYSTEMS: All other review systems are negative      ALLERGIES: No Known Allergies      Vital Signs Last 24 Hrs  T(C): 37.1 (27 Sep 2021 13:37), Max: 37.1 (27 Sep 2021 13:37)  T(F): 98.7 (27 Sep 2021 13:37), Max: 98.7 (27 Sep 2021 13:37)  HR: 94 (27 Sep 2021 13:37) (88 - 98)  BP: 131/59 (27 Sep 2021 13:37) (117/54 - 131/59)  BP(mean): --  RR: 18 (27 Sep 2021 13:37) (18 - 20)  SpO2: 94% (27 Sep 2021 13:37) (90% - 98%)      PHYSICAL EXAM:  GENERAL: Not in distress, on Oxygen via NC  CHEST/LUNG:  Not using accessory muscles  HEART: s1 and s2 present  ABDOMEN:  Mild distended   EXTREMITIES: Right foot bandage in placed   CNS: Awake and Alert      LABS:                                   9.6    9.09  )-----------( 302      ( 27 Sep 2021 06:17 )             29.2                8.9    7.45  )-----------( 248      ( 26 Sep 2021 07:23 )             26.9       09-27    142  |  109<H>  |  49<H>  ----------------------------<  158<H>  3.9   |  21<L>  |  3.19<H>    Ca    8.5      27 Sep 2021 06:17  Mg     2.4     09-27    TPro  7.1  /  Alb  2.4<L>  /  TBili  0.4  /  DBili  x   /  AST  20  /  ALT  14  /  AlkPhos  103  09-27 09-25    139  |  107  |  41<H>  ----------------------------<  134<H>  4.1   |  21<L>  |  4.05<H>    Ca    8.6      25 Sep 2021 06:40    TPro  6.6  /  Alb  2.3<L>  /  TBili  0.5  /  DBili  x   /  AST  25  /  ALT  15  /  AlkPhos  102  09-25 09-19    137  |  107  |  17  ----------------------------<  82  4.1   |  22  |  1.85<H>    Ca    9.1      19 Sep 2021 06:28    TPro  7.2  /  Alb  2.7<L>  /  TBili  0.6  /  DBili  x   /  AST  10  /  ALT  13  /  AlkPhos  97  09-19        CAPILLARY BLOOD GLUCOSE  POCT Blood Glucose.: 134 mg/dL (17 Sep 2021 17:11)  POCT Blood Glucose.: 128 mg/dL (17 Sep 2021 11:06)  POCT Blood Glucose.: 157 mg/dL (17 Sep 2021 04:10)      MEDICATIONS  (STANDING):  ampicillin/sulbactam  IVPB 3 Gram(s) IV Intermittent every 12 hours  atorvastatin 80 milliGRAM(s) Oral at bedtime  bisacodyl Suppository 10 milliGRAM(s) Rectal once  cyanocobalamin 1000 MICROGram(s) Oral daily  ergocalciferol 64796 Unit(s) Oral <User Schedule>  ferrous    sulfate 325 milliGRAM(s) Oral daily  fluconAZOLE IVPB      fluconAZOLE IVPB 100 milliGRAM(s) IV Intermittent every 24 hours  heparin   Injectable 5000 Unit(s) SubCutaneous every 8 hours  influenza   Vaccine 0.5 milliLiter(s) IntraMuscular once  insulin lispro (ADMELOG) corrective regimen sliding scale   SubCutaneous Before meals and at bedtime  levoFLOXacin  Tablet 250 milliGRAM(s) Oral every 48 hours  metoprolol succinate  milliGRAM(s) Oral daily  NIFEdipine XL 60 milliGRAM(s) Oral daily  polyethylene glycol 3350 17 Gram(s) Oral daily  senna 2 Tablet(s) Oral at bedtime  tamsulosin 0.4 milliGRAM(s) Oral at bedtime      RADIOLOGY & ADDITIONAL TESTS:    9/26/21: Xray Chest 1 View-PORTABLE IMMEDIATE (Xray Chest 1 View-PORTABLE IMMEDIATE .) (09.26.21 @ 15:28) : Small bilateral pleural effusions and mild pulmonary edema. A right lower lobe pneumonia is not excluded.      9/26/21: Xray Abdomen 1 View Portable, IMMEDIATE (Xray Abdomen 1 View Portable, IMMEDIATE .) (09.26.21 @ 15:28) : There is a nasogastric tube with its tip in the stomach. There is gaseous distention of the colon. No pathologic calcifications are seen. The osseous structures are intact with degenerative change is present in the spine.      9/17/21 : MR Foot No Cont, Right (09.17.21 @ 13:04) : Resection at the mid aspect of the fifth metatarsal. Lateral soft tissue wound with marrow signal abnormality throughout the fifth metatarsal and within the adjacent fourth metatarsal head which is nonspecific in the setting of recent surgery although osteomyelitis is suspected.    9/16/21 : Xray Foot AP + Lateral + Oblique, Right (09.16.21 @ 15:03) : No acute finding. No plain film evidence of osteomyelitis.        MICROBIOLOGY DATA:      Urine Microscopic-Add On (NC) (09.22.21 @ 16:14)   Red Blood Cell - Urine: 0-2 /HPF   White Blood Cell - Urine: 3-5 /HPF   Bacteria: Moderate /HPF   Comment - Urine: yeast cells present   Epithelial Cells: Moderate /HPF     Culture - Tissue with Gram Stain (09.22.21 @ 13:54)   Gram Stain: No polymorphonuclear cells seen per low power field   No organisms seen per oil power field   Specimen Source: .Tissue Right 5th toe r/o osteomyeltis   Culture Results: No growth     COVID-19 David Domain Antibody (09.18.21 @ 12:55)   COVID-19 David Domain Antibody Result: >250.00:    Culture - Blood (09.17.21 @ 10:28)   Specimen Source: .Blood Blood-Peripheral   Culture Results: No growth to date.     Culture - Blood (09.17.21 @ 10:28)   Specimen Source: .Blood Blood-Venous   Culture Results: No growth to date.     Culture - Surgical Swab (09.16.21 @ 21:42)   - Amikacin: S <=16   - Amikacin: S <=16   - Amoxicillin/Clavulanic Acid: S <=8/4   - Ampicillin: R >16 These ampicillin results predict results for amoxicillin   - Ampicillin: S <=2 Predicts results to ampicillin/sulbactam, amoxacillin-clavulanate and piperacillin-tazobactam.   - Ampicillin/Sulbactam: S 8/4 Enterobacter, Citrobacter, and Serratia may develop resistance during prolonged therapy (3-4 days)   - Ampicillin/Sulbactam: R >16/8   - Aztreonam: S <=4   - Aztreonam: S <=4   - Cefazolin: R 16 Enterobacter, Citrobacter, and Serratia may develop resistance during prolonged therapy (3-4 days)   - Cefazolin: R >16   - Cefepime: S <=2   - Cefepime: S <=2   - Cefoxitin: S <=8   - Cefoxitin: S <=8   - Ceftazidime: S <=1   - Ceftriaxone: S <=1   - Ceftriaxone: S <=1 Enterobacter, Citrobacter, and Serratia may develop resistance during prolonged therapy   - Ciprofloxacin: S <=0.25   - Ciprofloxacin: S <=0.25   - Ertapenem: S <=0.5   - Gentamicin: S <=2   - Gentamicin: S <=2   - Imipenem: S <=1   - Levofloxacin: S <=0.5   - Levofloxacin: S <=0.5   - Meropenem: S <=1   - Meropenem: S <=1   - Piperacillin/Tazobactam: S <=8   - Piperacillin/Tazobactam: S <=8   - Tetra/Doxy: S 4   - Tobramycin: S <=2   - Trimethoprim/Sulfamethoxazole: S <=0.5/9.5   - Trimethoprim/Sulfamethoxazole: S <=0.5/9.5   - Vancomycin: S 2   Specimen Source: .Surgical Swab right foot wound   Culture Results:   Culture yields >4 types of aerobic and/or anaerobic bacteria   Call client services within 7 days if further workup is clinically   indicated. Culture includes   Rare Aeromonas hydrophila/caviae   Few Klebsiella oxytoca/Raoutella ornithinolytica   Few Enterococcus faecalis   Few Streptococcus agalactiae (Group B) isolated   Group B streptococci are susceptible to ampicillin,   penicillin and cefazolin, but may be resistant to   erythromycin and clindamycin.   Recommendations for intrapartum prophylaxis for Group B   streptococci are penicillin or ampicillin.   Organism Identification: Aeromonas hydrophila/caviae   Klebsiella oxytoca /Raoutella ornithinolytica   Enterococcus faecalis   Organism: Aeromonas hydrophila/caviae   Organism: Klebsiella oxytoca /Raoutella ornithinolytica   Organism: Enterococcus faecalis   COVID-19 PCR . (09.16.21 @ 22:24)   COVID-19 PCR: NotDetec:

## 2021-09-27 NOTE — PROGRESS NOTE ADULT - PROBLEM SELECTOR PLAN 1
repeat CXR this AM with severe pulmonary edema  f/u EKG, troponin, proBNP  Lasix 80 mg x 1   monitor respiratory status  ICU consulted  tight I's and O's, daily weights  ibrahim catheter for close UO monitoring repeat CXR this AM with pulm edema  f/u EKG, troponin, proBNP, ABG  Lasix 80 mg x 1   monitor respiratory status  ICU consulted  tight I's and O's, daily weights  ibrahim catheter for close UO monitoring

## 2021-09-27 NOTE — PROGRESS NOTE ADULT - PROBLEM SELECTOR PLAN 1
Pt with right foot pain due to s/p 5th toe wound debridement and wound vac application on 9/22, POD #5. Being treated for OM. Pt with OREN, today slightly improved to Cr 3.19. Being treated for OM. Remains NPO. Pt self d/c'd NGT 9/26, with episode of hypoxia, now requiring non-rebreather. Attempts to replace NGT unsuccessful. ICU consulted. Also concern for bowel obstruction.  High risk medications reviewed. Avoid polypharmacy. Avoid NSAIDs and benzodiazepines. Non-pharmacological sleep aides initiated. Non-opioid medications and non-pharmacological pain management measures initiated.   nonopioid recc  - acetaminophen 1 gram IV q 8 hours x 24hr only completed.   - Discontinued gabapentin 100mg po q 12 hours on 9/24 (pt with worsening OREN)  - no nsaids - pt with oren  opioid recc  - Dilaudid 0.5 mg q 4 hours prn daily prior to dressing changes only.  - Will d/c oxycodone given concern for bowel obstruction. Last BM 9/27.  bowel regimen  - resume senna and miralax once pt able to take PO.

## 2021-09-27 NOTE — PROGRESS NOTE ADULT - ASSESSMENT
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 s/p OR debridement today. Nephrology consulted for abrupt rise in SCr.     1. OREN- likely ATN in the setting of infection and ACEi use. Lisinopril discontinued 9/22. Renal function plateaued and now recovering. Pt with SOB this am s/p Lasix 80mg IV x1. Recc Lasix 40mg IV q12hrs for 1-2 days.   Kidney/ Bladder US with large distended bladder s/p ibrahim placement on 9/23; renal function did not improve post ibrahim; less likely due to obstructive uropathy. UA with 30 protein, trace blood with small LE, Check UCX to r/o UTI.   FeNa 0.72% and FeUrea 19.39%; consistent with pre-renal OREN. However renal function worsening on IVF. No peripheral eosinophilia- no signs of AIN. C3 & C4 wnl with neg ASO- no signs of PIGN. Strict I/Os. Avoid nephrotoxins/ NSAIDs/ RCA. Monitor BMP.  2. 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 Will defer secondary w/u as an outpt. Avoid nephrotoxins  3. HTN 2/2 CKD- BP acceptable. Lisinopril d/c due to OREN. c/w Nifedipine ER 60mg PO qd. Monitor BP  4. Acute osteomyelitis- s/p debridement 9/22. Pt on Unasyn/ Levaquin renally dosed. Plan as per ID      [facility] NEPHROLOG[facility]  Bryn Johnson M.D.  Domingo Booth D.O.  Zakia Rodriguez M.D.  Zonia Adams, MSN, ANP-C  [phone]    [address] [place] [address]

## 2021-09-27 NOTE — PROGRESS NOTE ADULT - SUBJECTIVE AND OBJECTIVE BOX
Patient is a 76y old  Male who presents with a chief complaint of R Foot Pain (27 Sep 2021 16:42)    PATIENT IS SEEN AND EXAMINED IN MEDICAL FLOOR.      ALLERGIES:  No Known Allergies      VITALS:    Vital Signs Last 24 Hrs  T(C): 37.1 (27 Sep 2021 13:37), Max: 37.1 (27 Sep 2021 13:37)  T(F): 98.7 (27 Sep 2021 13:37), Max: 98.7 (27 Sep 2021 13:37)  HR: 94 (27 Sep 2021 13:37) (89 - 98)  BP: 131/59 (27 Sep 2021 13:37) (120/63 - 131/59)  BP(mean): --  RR: 18 (27 Sep 2021 13:37) (18 - 20)  SpO2: 94% (27 Sep 2021 13:37) (94% - 98%)    LABS:    CBC Full  -  ( 27 Sep 2021 06:17 )  WBC Count : 9.09 K/uL  RBC Count : 3.29 M/uL  Hemoglobin : 9.6 g/dL  Hematocrit : 29.2 %  Platelet Count - Automated : 302 K/uL  Mean Cell Volume : 88.8 fl  Mean Cell Hemoglobin : 29.2 pg  Mean Cell Hemoglobin Concentration : 32.9 gm/dL  Auto Neutrophil # : x  Auto Lymphocyte # : x  Auto Monocyte # : x  Auto Eosinophil # : x  Auto Basophil # : x  Auto Neutrophil % : x  Auto Lymphocyte % : x  Auto Monocyte % : x  Auto Eosinophil % : x  Auto Basophil % : x      09-27    142  |  109<H>  |  49<H>  ----------------------------<  158<H>  3.9   |  21<L>  |  3.19<H>    Ca    8.5      27 Sep 2021 06:17  Mg     2.4     09-27    TPro  7.1  /  Alb  2.4<L>  /  TBili  0.4  /  DBili  x   /  AST  20  /  ALT  14  /  AlkPhos  103  09-27    CAPILLARY BLOOD GLUCOSE      POCT Blood Glucose.: 161 mg/dL (27 Sep 2021 16:57)  POCT Blood Glucose.: 140 mg/dL (27 Sep 2021 11:40)  POCT Blood Glucose.: 151 mg/dL (27 Sep 2021 08:19)  POCT Blood Glucose.: 190 mg/dL (26 Sep 2021 20:46)    CARDIAC MARKERS ( 27 Sep 2021 11:04 )  1.460 ng/mL / x     / x     / x     / x          LIVER FUNCTIONS - ( 27 Sep 2021 06:17 )  Alb: 2.4 g/dL / Pro: 7.1 g/dL / ALK PHOS: 103 U/L / ALT: 14 U/L DA / AST: 20 U/L / GGT: x           Creatinine Trend: 3.19<--, 3.83<--, 4.05<--, 3.97<--, 3.20<--, 2.52<--  I&O's Summary    26 Sep 2021 07:01  -  27 Sep 2021 07:00  --------------------------------------------------------  IN: 300 mL / OUT: 700 mL / NET: -400 mL    27 Sep 2021 07:01  -  27 Sep 2021 18:36  --------------------------------------------------------  IN: 0 mL / OUT: 500 mL / NET: -500 mL        ABG - ( 27 Sep 2021 10:19 )  pH, Arterial: 7.46  pH, Blood: x     /  pCO2: 30    /  pO2: 81    / HCO3: 21    / Base Excess: -1.5  /  SaO2: 98                  .Tissue Right 5th toe r/o osteomyeltis  09-22 @ 13:54   No growth at 5 days  --    No polymorphonuclear cells seen per low power field  No organisms seen per oil power field      .Blood Blood-Venous  09-17 @ 10:28   No Growth Final  --  --      .Surgical Swab right foot wound  09-16 @ 21:42   Culture yields >4 types of aerobic and/or anaerobic bacteria  Call client services within 7 days if further workup is clinically  indicated. Culture includes  Rare Aeromonas hydrophila/caviae  Few Klebsiella oxytoca/Raoutella ornithinolytica  Few Enterococcus faecalis  Few Streptococcus agalactiae (Group B) isolated  Group B streptococci are susceptible to ampicillin,  penicillin and cefazolin, but may be resistant to  erythromycin and clindamycin.  Recommendations for intrapartum prophylaxis for Group B  streptococci are penicillin or ampicillin.  --  Aeromonas hydrophila/caviae  Klebsiella oxytoca /Raoutella ornithinolytica  Enterococcus faecalis      Bone R 5th metatarsal bone clean ma  08-20 @ 03:59   No growth at 5 days  --    No polymorphonuclear leukocytes seen per low power field  No organisms seen per oil power field      .Blood Blood-Venous  08-14 @ 21:09   No Growth Final  --  --      .Surgical Swab Right foot plantar wound  08-14 @ 21:08   Moderate Streptococcus agalactiae (Group B) isolated  Group B streptococci are susceptible to ampicillin,  penicillin and cefazolin, but may be resistant to  erythromycin and clindamycin.  Recommendations for intrapartum prophylaxis for Group B  streptococci are penicillin or ampicillin.  Moderate Bacteroides fragilis "Susceptibilities not performed"  Normal skin filiberto isolated  --  --          MEDICATIONS:    MEDICATIONS  (STANDING):  ampicillin/sulbactam  IVPB 3 Gram(s) IV Intermittent every 12 hours  atorvastatin 80 milliGRAM(s) Oral at bedtime  bisacodyl Suppository 10 milliGRAM(s) Rectal once  cyanocobalamin 1000 MICROGram(s) Oral daily  ergocalciferol 90297 Unit(s) Oral <User Schedule>  ferrous    sulfate 325 milliGRAM(s) Oral daily  fluconAZOLE IVPB      fluconAZOLE IVPB 100 milliGRAM(s) IV Intermittent every 24 hours  heparin   Injectable 5000 Unit(s) SubCutaneous every 8 hours  influenza   Vaccine 0.5 milliLiter(s) IntraMuscular once  insulin lispro (ADMELOG) corrective regimen sliding scale   SubCutaneous Before meals and at bedtime  levoFLOXacin  Tablet 250 milliGRAM(s) Oral every 48 hours  metoprolol succinate  milliGRAM(s) Oral daily  NIFEdipine XL 60 milliGRAM(s) Oral daily  polyethylene glycol 3350 17 Gram(s) Oral daily  senna 2 Tablet(s) Oral at bedtime  tamsulosin 0.4 milliGRAM(s) Oral at bedtime      MEDICATIONS  (PRN):  ondansetron Injectable 4 milliGRAM(s) IV Push three times a day PRN Nausea and/or Vomiting      REVIEW OF SYSTEMS:                           ALL ROS DONE [ X   ]    CONSTITUTIONAL:  LETHARGIC [   ], FEVER [   ], UNRESPONSIVE [   ]  CVS:  CP  [   ], SOB, [   ], PALPITATIONS [   ], DIZZYNESS [   ]  RS: COUGH [   ], SPUTUM [   ]  GI: ABDOMINAL PAIN [   ], NAUSEA [   ], VOMITINGS [   ], DIARRHEA [   ], CONSTIPATION [   ]  :  DYSURIA [   ], NOCTURIA [   ], INCREASED FREQUENCY [   ], DRIBLING [   ],  SKELETAL: PAINFUL JOINTS [   ], SWOLLEN JOINTS [   ], NECK ACHE [   ], LOW BACK ACHE [   ],  SKIN : ULCERS [   ], RASH [   ], ITCHING [   ]  CNS: HEAD ACHE [   ], DOUBLE VISION [   ], BLURRED VISION [   ], AMS / CONFUSION [   ], SEIZURES [   ], WEAKNESS [   ],TINGLING / NUMBNESS [   ]    PHYSICAL EXAMINATION:  GENERAL APPEARANCE: NO DISTRESS  HEENT:  NO PALLOR, NO  JVD,  NO   NODES, NECK SUPPLE  CVS: S1 +, S2 +,   RS: AEEB,  OCCASIONAL  RALES +,   NO RONCHI, CRACKLES +  ABD: SOFT, NT, NO, BS +     ;  DISTENDED +  EXT: NO PE  SKIN: WARM,   RIGHT FOOT WRAPPED W/ WOUND VAC IN PLACE  SKELETAL:  ROM ACCEPTABLE  CNS:  AAO X  2     RADIOLOGY :    EXAM:  CT ABDOMEN AND PELVIS OC                            PROCEDURE DATE:  09/27/2021          INTERPRETATION:  CLINICAL INFORMATION: Small bowel obstruction.    COMPARISON: None.    CONTRAST/COMPLICATIONS:  IV Contrast: NONE  Oral Contrast: Omnipaque 300  Complications: None reported at time of study completion    PROCEDURE:  CT of the Abdomen and Pelvis was performed.  Sagittal and coronal reformats were performed.    FINDINGS:  LOWER CHEST: Small bilateral pleural effusions with compressiveatelectasis of both lower lobes.    LIVER: Within normal limits.  BILE DUCTS: Normal caliber.  GALLBLADDER: Distended. Small layering gallstones.  SPLEEN: Within normal limits.  PANCREAS: Within normal limits.  ADRENALS: Within normal limits.  KIDNEYS/URETERS: No hydronephrosis. Nonobstructing left upper pole calculus, measuring 0.6 cm.    BLADDER: Urinary bladder contains air and a Castañeda catheter balloon.  REPRODUCTIVE ORGANS: Prostate is not enlarged.    BOWEL: No bowel obstruction. Appendix isnormal. Colonic diverticulosis.  PERITONEUM: Mild presacral fluid.  VESSELS: Atherosclerotic changes.  RETROPERITONEUM/LYMPH NODES: No lymphadenopathy.  ABDOMINAL WALL: Within normal limits.  BONES: Degenerative changes. Sternotomy.    IMPRESSION:  No acute intra-abdominal pathology.    Small bilateral pleural effusions with compressive atelectasis of both lower lobes.    ====================================================    EXAM:  MR FOOT RT                            PROCEDURE DATE:  09/17/2021          INTERPRETATION:  Clinical Information: Recent fifth metatarsal head resection now with fifth digit pain, swelling and foul discharge.    Comparison: Radiographs of the right foot from 9/16/2021 and MRI the right foot from 8/16/2021.    Technique:  MRI of the right midfoot and forefoot.  Intravenous Contrast: None.    Findings:    There is a resection at the mid aspect of the fifth metatarsal shaft. There is a lateral soft tissue wound beginning at the level of the amputation which extends distally. There is susceptibility artifact in the region of the amputation consistent with postoperative change. There is hyperintense T2 marrow signal throughout the remaining fifth metatarsal and within the adjacent fourth metatarsal head which is nonspecific and could be related to recent postoperative changes although osteomyelitis is suspected.    There is edema and mild atrophy within the plantar muscles of the foot. There is minimal spurring at the first metatarsophalangeal and hallux sesamoid articulations.    Impression:  Resection at the mid aspect of the fifth metatarsal. Lateral soft tissue wound with marrow signal abnormality throughout the fifth metatarsal and within the adjacent fourth metatarsal head which is nonspecific in the setting of recent surgery although osteomyelitis is suspected.        ASSESSMENT :     Headache    HTN (hypertension)    HLD (hyperlipidemia)    DM (diabetes mellitus)    CAD (coronary artery disease)    Glaucoma, angle-closure    [place] (hard of hearing)    No significant past surgical history    S/P CABG (coronary artery bypass graft)    History of thyroid surgery        PLAN:  HPI:  76M from home, lives with daughter w/ PMH DM on insulin, HTN, HLD, CAD, 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 Pt with sharp pain on the R lateral foot. As per daughter, pt was taking tylenol which did not help his pain prompting the patient to ask his daughter to take him to the hospital. Pt is a poor historian. Daughter states that the patient did not see his podiatrist after the surgery. No fever, chest, pain, palpitations, nausea, vomiting, diarrhea (17 Sep 2021 01:11)    # SUSPECT RIGHT FOOT OSTEOMYELITIS S/P RIGHT 5TH RAY RESECTION [8/19] AND S/P DEBRIDEMENT, GRAFT APPLICATION, BONE BX AND WOUND VAC APPLICATION 9/22  ** RECENT ADMISSION FOR RIGHT DIABETIC FOOT ULCER, CELLULITIS, OSTEOMYELITIS RIGHT 5th METATARSAL S/P RIGHT 5TH PARTIAL RAY AMPUTATION [8/20] - BONE PATHOLOGY W/ CLEAN MARGINS    - S/P VANCOMYCIN AND ZOSYN ; NOW ON UNASYN AND LEVAQUIN GIVEN OREN  - RECENTLY HAD SIMON W/ MILD PAD  - REVIEWED WOUND CX [ ENTEROCOCCUS, AEROMONAS, KLEBSIELLA] AND BCX   - ID CONSULT, PODIATRY CONSULT    # ACUTE HYPOXIC RESPIRATORY FAILURE SUSPECT S/T PULMONARY EDEMA IN THE SETTING OF SEVERE AORTIC STENOSIS + ASPIRATION PNA - ON LEVAQUIN, STARTED LASIX, CARDIOLOGY CONSULT IN PROGRESS  - EVALUATED BY CRITICAL CARE THIS A.M. - PATIENT HAS SINCE IMPROVED  - F/U EKG, REPEAT CXR IN A.M.      # BOWEL DISTENTION - ? ILEUS - OPTIMIZING ELECTROLYTES, SURGERY CONSULT IN PROGRESS  - F/U LACTIC ACID    # ASPIRATION EVENT WHEN PATIENT REMOVED NGT - ON LEVAQUIN, ID CONSULT IN PROGRESS    # DISTENDED GB W/ CHOLELITHIASIS - F/U LFTS, F/U RUQ U/S, SURGERY CONSULT IN PROGRESS    # OREN ON CKD - S/T ATN AND URINARY RETENTION - S/P IVF, NOW W/ CASTAÑEDA, NEPHROLOGY CONUSLT IN PROGRESS    # MEDICAL CLEARANCE FOR SURGERY - RCRI - 2 - 10.1 % 30 DAY RISK OF DEATH, MI OR CARDIAC ARREST  - CARDIOLOGY CONSULT     # DM -  HBA1C - 11  - LANTUS, SSI + FS    # CAD S/P CABG - PLACED ON ASA, STATIN, BB  - ECHO - SEVERE AORTIC STENOSIS, SEVERE CONCENTRIC LVH, G1DD, MILD [place]  - CARDIOLOGY CONSULT - DR. BASSETT    # HTN - ON METOPROLOL, NICARDIPINE    # SEVERE, ASYMPTOMATIC, STENOSIS - ONCE ACUTE ILLNESS RESOLVES, WILL LIKELY NEED ISCHEMIC WORKUP, SABIHA TO EVALUATE FURTHER. D/W PATIENT, DAUGHTER AND CARDIOLOGY TEAM [PREVIOUSLY SAW DR. BASSETT]    # VITAMIN D DEFICIENCY - ON CHOLECALCIFEROL    # HLD - STATIN    # CASE DISCUSSED AT LENGTH WITH PATIENT AND DAUGHTER, BIRDIEWENDY ROBERT AT BEDSIDE  # PATIENT AND DAUGHTER REFUSED [facility] ON PREVIOUS ADMISSION, PREVIOUSLY WISHED FOR PATIENT RETURN HOME W/ HOME PT; HOWEVER NOW, DAUGHTER WISHES FOR PATIENT TO GO TO [facility], AS PATIENT'S WIFE IS CURRENTLY ADMITTED THERE    # GI AND DVT PPX        Patient is a 76y old  Male who presents with a chief complaint of R Foot Pain (27 Sep 2021 16:42)    PATIENT IS SEEN AND EXAMINED IN MEDICAL FLOOR.      ALLERGIES:  No Known Allergies      VITALS:    Vital Signs Last 24 Hrs  T(C): 37.1 (27 Sep 2021 13:37), Max: 37.1 (27 Sep 2021 13:37)  T(F): 98.7 (27 Sep 2021 13:37), Max: 98.7 (27 Sep 2021 13:37)  HR: 94 (27 Sep 2021 13:37) (89 - 98)  BP: 131/59 (27 Sep 2021 13:37) (120/63 - 131/59)  BP(mean): --  RR: 18 (27 Sep 2021 13:37) (18 - 20)  SpO2: 94% (27 Sep 2021 13:37) (94% - 98%)    LABS:    CBC Full  -  ( 27 Sep 2021 06:17 )  WBC Count : 9.09 K/uL  RBC Count : 3.29 M/uL  Hemoglobin : 9.6 g/dL  Hematocrit : 29.2 %  Platelet Count - Automated : 302 K/uL  Mean Cell Volume : 88.8 fl  Mean Cell Hemoglobin : 29.2 pg  Mean Cell Hemoglobin Concentration : 32.9 gm/dL  Auto Neutrophil # : x  Auto Lymphocyte # : x  Auto Monocyte # : x  Auto Eosinophil # : x  Auto Basophil # : x  Auto Neutrophil % : x  Auto Lymphocyte % : x  Auto Monocyte % : x  Auto Eosinophil % : x  Auto Basophil % : x      09-27    142  |  109<H>  |  49<H>  ----------------------------<  158<H>  3.9   |  21<L>  |  3.19<H>    Ca    8.5      27 Sep 2021 06:17  Mg     2.4     09-27    TPro  7.1  /  Alb  2.4<L>  /  TBili  0.4  /  DBili  x   /  AST  20  /  ALT  14  /  AlkPhos  103  09-27    CAPILLARY BLOOD GLUCOSE      POCT Blood Glucose.: 161 mg/dL (27 Sep 2021 16:57)  POCT Blood Glucose.: 140 mg/dL (27 Sep 2021 11:40)  POCT Blood Glucose.: 151 mg/dL (27 Sep 2021 08:19)  POCT Blood Glucose.: 190 mg/dL (26 Sep 2021 20:46)    CARDIAC MARKERS ( 27 Sep 2021 11:04 )  1.460 ng/mL / x     / x     / x     / x          LIVER FUNCTIONS - ( 27 Sep 2021 06:17 )  Alb: 2.4 g/dL / Pro: 7.1 g/dL / ALK PHOS: 103 U/L / ALT: 14 U/L DA / AST: 20 U/L / GGT: x           Creatinine Trend: 3.19<--, 3.83<--, 4.05<--, 3.97<--, 3.20<--, 2.52<--  I&O's Summary    26 Sep 2021 07:01  -  27 Sep 2021 07:00  --------------------------------------------------------  IN: 300 mL / OUT: 700 mL / NET: -400 mL    27 Sep 2021 07:01  -  27 Sep 2021 18:36  --------------------------------------------------------  IN: 0 mL / OUT: 500 mL / NET: -500 mL        ABG - ( 27 Sep 2021 10:19 )  pH, Arterial: 7.46  pH, Blood: x     /  pCO2: 30    /  pO2: 81    / HCO3: 21    / Base Excess: -1.5  /  SaO2: 98                  .Tissue Right 5th toe r/o osteomyeltis  09-22 @ 13:54   No growth at 5 days  --    No polymorphonuclear cells seen per low power field  No organisms seen per oil power field      .Blood Blood-Venous  09-17 @ 10:28   No Growth Final  --  --      .Surgical Swab right foot wound  09-16 @ 21:42   Culture yields >4 types of aerobic and/or anaerobic bacteria  Call client services within 7 days if further workup is clinically  indicated. Culture includes  Rare Aeromonas hydrophila/caviae  Few Klebsiella oxytoca/Raoutella ornithinolytica  Few Enterococcus faecalis  Few Streptococcus agalactiae (Group B) isolated  Group B streptococci are susceptible to ampicillin,  penicillin and cefazolin, but may be resistant to  erythromycin and clindamycin.  Recommendations for intrapartum prophylaxis for Group B  streptococci are penicillin or ampicillin.  --  Aeromonas hydrophila/caviae  Klebsiella oxytoca /Raoutella ornithinolytica  Enterococcus faecalis      Bone R 5th metatarsal bone clean ma  08-20 @ 03:59   No growth at 5 days  --    No polymorphonuclear leukocytes seen per low power field  No organisms seen per oil power field      .Blood Blood-Venous  08-14 @ 21:09   No Growth Final  --  --      .Surgical Swab Right foot plantar wound  08-14 @ 21:08   Moderate Streptococcus agalactiae (Group B) isolated  Group B streptococci are susceptible to ampicillin,  penicillin and cefazolin, but may be resistant to  erythromycin and clindamycin.  Recommendations for intrapartum prophylaxis for Group B  streptococci are penicillin or ampicillin.  Moderate Bacteroides fragilis "Susceptibilities not performed"  Normal skin filiberto isolated  --  --          MEDICATIONS:    MEDICATIONS  (STANDING):  ampicillin/sulbactam  IVPB 3 Gram(s) IV Intermittent every 12 hours  atorvastatin 80 milliGRAM(s) Oral at bedtime  bisacodyl Suppository 10 milliGRAM(s) Rectal once  cyanocobalamin 1000 MICROGram(s) Oral daily  ergocalciferol 73279 Unit(s) Oral <User Schedule>  ferrous    sulfate 325 milliGRAM(s) Oral daily  fluconAZOLE IVPB      fluconAZOLE IVPB 100 milliGRAM(s) IV Intermittent every 24 hours  heparin   Injectable 5000 Unit(s) SubCutaneous every 8 hours  influenza   Vaccine 0.5 milliLiter(s) IntraMuscular once  insulin lispro (ADMELOG) corrective regimen sliding scale   SubCutaneous Before meals and at bedtime  levoFLOXacin  Tablet 250 milliGRAM(s) Oral every 48 hours  metoprolol succinate  milliGRAM(s) Oral daily  NIFEdipine XL 60 milliGRAM(s) Oral daily  polyethylene glycol 3350 17 Gram(s) Oral daily  senna 2 Tablet(s) Oral at bedtime  tamsulosin 0.4 milliGRAM(s) Oral at bedtime      MEDICATIONS  (PRN):  ondansetron Injectable 4 milliGRAM(s) IV Push three times a day PRN Nausea and/or Vomiting      REVIEW OF SYSTEMS:                           ALL ROS DONE [ X   ]    CONSTITUTIONAL:  LETHARGIC [   ], FEVER [   ], UNRESPONSIVE [   ]  CVS:  CP  [   ], SOB, [   ], PALPITATIONS [   ], DIZZYNESS [   ]  RS: COUGH [   ], SPUTUM [   ]  GI: ABDOMINAL PAIN [   ], NAUSEA [   ], VOMITINGS [   ], DIARRHEA [   ], CONSTIPATION [   ]  :  DYSURIA [   ], NOCTURIA [   ], INCREASED FREQUENCY [   ], DRIBLING [   ],  SKELETAL: PAINFUL JOINTS [   ], SWOLLEN JOINTS [   ], NECK ACHE [   ], LOW BACK ACHE [   ],  SKIN : ULCERS [   ], RASH [   ], ITCHING [   ]  CNS: HEAD ACHE [   ], DOUBLE VISION [   ], BLURRED VISION [   ], AMS / CONFUSION [   ], SEIZURES [   ], WEAKNESS [   ],TINGLING / NUMBNESS [   ]    PHYSICAL EXAMINATION:  GENERAL APPEARANCE: NO DISTRESS  HEENT:  NO PALLOR, NO  JVD,  NO   NODES, NECK SUPPLE  CVS: S1 +, S2 +,   RS: AEEB,  OCCASIONAL  RALES +,   NO RONCHI, CRACKLES +  ABD: SOFT, NT, NO, BS +     ;  DISTENDED +  EXT: NO PE  SKIN: WARM,   RIGHT FOOT WRAPPED W/ WOUND VAC IN PLACE  SKELETAL:  ROM ACCEPTABLE  CNS:  AAO X  2     RADIOLOGY :    EXAM:  CT ABDOMEN AND PELVIS OC                            PROCEDURE DATE:  09/27/2021          INTERPRETATION:  CLINICAL INFORMATION: Small bowel obstruction.    COMPARISON: None.    CONTRAST/COMPLICATIONS:  IV Contrast: NONE  Oral Contrast: Omnipaque 300  Complications: None reported at time of study completion    PROCEDURE:  CT of the Abdomen and Pelvis was performed.  Sagittal and coronal reformats were performed.    FINDINGS:  LOWER CHEST: Small bilateral pleural effusions with compressiveatelectasis of both lower lobes.    LIVER: Within normal limits.  BILE DUCTS: Normal caliber.  GALLBLADDER: Distended. Small layering gallstones.  SPLEEN: Within normal limits.  PANCREAS: Within normal limits.  ADRENALS: Within normal limits.  KIDNEYS/URETERS: No hydronephrosis. Nonobstructing left upper pole calculus, measuring 0.6 cm.    BLADDER: Urinary bladder contains air and a Castañeda catheter balloon.  REPRODUCTIVE ORGANS: Prostate is not enlarged.    BOWEL: No bowel obstruction. Appendix isnormal. Colonic diverticulosis.  PERITONEUM: Mild presacral fluid.  VESSELS: Atherosclerotic changes.  RETROPERITONEUM/LYMPH NODES: No lymphadenopathy.  ABDOMINAL WALL: Within normal limits.  BONES: Degenerative changes. Sternotomy.    IMPRESSION:  No acute intra-abdominal pathology.    Small bilateral pleural effusions with compressive atelectasis of both lower lobes.    ====================================================    EXAM:  MR FOOT RT                            PROCEDURE DATE:  09/17/2021          INTERPRETATION:  Clinical Information: Recent fifth metatarsal head resection now with fifth digit pain, swelling and foul discharge.    Comparison: Radiographs of the right foot from 9/16/2021 and MRI the right foot from 8/16/2021.    Technique:  MRI of the right midfoot and forefoot.  Intravenous Contrast: None.    Findings:    There is a resection at the mid aspect of the fifth metatarsal shaft. There is a lateral soft tissue wound beginning at the level of the amputation which extends distally. There is susceptibility artifact in the region of the amputation consistent with postoperative change. There is hyperintense T2 marrow signal throughout the remaining fifth metatarsal and within the adjacent fourth metatarsal head which is nonspecific and could be related to recent postoperative changes although osteomyelitis is suspected.    There is edema and mild atrophy within the plantar muscles of the foot. There is minimal spurring at the first metatarsophalangeal and hallux sesamoid articulations.    Impression:  Resection at the mid aspect of the fifth metatarsal. Lateral soft tissue wound with marrow signal abnormality throughout the fifth metatarsal and within the adjacent fourth metatarsal head which is nonspecific in the setting of recent surgery although osteomyelitis is suspected.        ASSESSMENT :     Headache    HTN (hypertension)    HLD (hyperlipidemia)    DM (diabetes mellitus)    CAD (coronary artery disease)    Glaucoma, angle-closure    [place] (hard of hearing)    No significant past surgical history    S/P CABG (coronary artery bypass graft)    History of thyroid surgery        PLAN:  HPI:  76M from home, lives with daughter w/ PMH DM on insulin, HTN, HLD, CAD, 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 Pt with sharp pain on the R lateral foot. As per daughter, pt was taking tylenol which did not help his pain prompting the patient to ask his daughter to take him to the hospital. Pt is a poor historian. Daughter states that the patient did not see his podiatrist after the surgery. No fever, chest, pain, palpitations, nausea, vomiting, diarrhea (17 Sep 2021 01:11)    # SUSPECT RIGHT FOOT OSTEOMYELITIS S/P RIGHT 5TH RAY RESECTION [8/19] AND S/P DEBRIDEMENT, GRAFT APPLICATION, BONE BX AND WOUND VAC APPLICATION 9/22  ** RECENT ADMISSION FOR RIGHT DIABETIC FOOT ULCER, CELLULITIS, OSTEOMYELITIS RIGHT 5th METATARSAL S/P RIGHT 5TH PARTIAL RAY AMPUTATION [8/20] - BONE PATHOLOGY W/ CLEAN MARGINS    - S/P VANCOMYCIN AND ZOSYN ; NOW ON UNASYN AND LEVAQUIN GIVEN OREN  - RECENTLY HAD SIMON W/ MILD PAD  - REVIEWED WOUND CX [ ENTEROCOCCUS, AEROMONAS, KLEBSIELLA] AND BCX   - ID CONSULT, PODIATRY CONSULT    # ACUTE HYPOXIC RESPIRATORY FAILURE SUSPECT S/T PULMONARY EDEMA IN THE SETTING OF SEVERE AORTIC STENOSIS + ASPIRATION PNA - ON LEVAQUIN, STARTED LASIX, CARDIOLOGY CONSULT IN PROGRESS  - EVALUATED BY CRITICAL CARE THIS A.M. - PATIENT HAS SINCE IMPROVED  - F/U EKG, REPEAT CXR IN A.M.      # BOWEL DISTENTION - ? ILEUS - OPTIMIZING ELECTROLYTES, SURGERY CONSULT IN PROGRESS  - F/U LACTIC ACID  - PASSED 2 BMS ON 9/27    # ASPIRATION EVENT WHEN PATIENT REMOVED NGT - ON LEVAQUIN, ID CONSULT IN PROGRESS    # DISTENDED GB W/ CHOLELITHIASIS - F/U LFTS, F/U RUQ U/S, SURGERY CONSULT IN PROGRESS    # OREN ON CKD - S/T ATN AND URINARY RETENTION - S/P IVF, NOW W/ CASTAÑEDA, NEPHROLOGY CONUSLT IN PROGRESS    # MEDICAL CLEARANCE FOR SURGERY - RCRI - 2 - 10.1 % 30 DAY RISK OF DEATH, MI OR CARDIAC ARREST  - CARDIOLOGY CONSULT     # DM -  HBA1C - 11  - LANTUS, SSI + FS    # CAD S/P CABG - PLACED ON ASA, STATIN, BB  - ECHO - SEVERE AORTIC STENOSIS, SEVERE CONCENTRIC LVH, G1DD, MILD [place]  - CARDIOLOGY CONSULT - DR. BASSETT    # HTN - ON METOPROLOL, NICARDIPINE    # SEVERE, ASYMPTOMATIC, STENOSIS - ONCE ACUTE ILLNESS RESOLVES, WILL LIKELY NEED ISCHEMIC WORKUP, SABIHA TO EVALUATE FURTHER. D/W PATIENT, DAUGHTER AND CARDIOLOGY TEAM [PREVIOUSLY SAW DR. BASSETT]    # VITAMIN D DEFICIENCY - ON CHOLECALCIFEROL    # HLD - STATIN    # CASE DISCUSSED AT LENGTH WITH PATIENT AND DAUGHTER, BIRDIE YESICA AT BEDSIDE  # PATIENT AND DAUGHTER REFUSED [facility] ON PREVIOUS ADMISSION, PREVIOUSLY WISHED FOR PATIENT RETURN HOME W/ HOME PT; HOWEVER NOW, DAUGHTER WISHES FOR PATIENT TO GO TO [facility], AS PATIENT'S WIFE IS CURRENTLY ADMITTED THERE    # GI AND DVT PPX

## 2021-09-27 NOTE — PROGRESS NOTE ADULT - SUBJECTIVE AND OBJECTIVE BOX
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 Pt with sharp pain on the R lateral foot. As per daughter, pt was taking tylenol which did not help his pain prompting the patient to ask his daughter to take him to the hospital. Pt is a poor historian. Daughter states that the patient did not see his podiatrist after the surgery. No fever, chest, pain, palpitations, nausea, vomiting, diarrhea (17 Sep 2021 01:11)    Pt is s/p 5th toe wound debridement and wound vac application on 9/22, POD #5. Being treated for OM. Pt with OREN, today slightly improved to Cr 3.19. Pain consulted for foot pain. Per primary team, NGT placed for distended loops of bowl which  patient self discontinued on 9/26 and had oxygen desaturations to the low 80's requiring non-rebreather. Attempts to replace NGT unsuccessful as patient could not tolerate. ICU consulted. Also concern for bowel obstruction. Pt seen and examined at bedside. Pt laying in bed, lethargic, O2 via nonrebreather in place with b/l mitten restraints. Pt denies pain. Wound vac in place. Pt NPO Pt denies constipation and itchiness. Last BM 9/27. Patient stated goal for pain control: to be comfortable in bed.    PAST MEDICAL & SURGICAL HISTORY:  HTN (hypertension)    HLD (hyperlipidemia)    DM (diabetes mellitus)    CAD (coronary artery disease)    Glaucoma, angle-closure    S/P CABG (coronary artery bypass graft)    History of thyroid surgery        FAMILY HISTORY:  Family history of acute myocardial infarction (Father)    Family history of diabetes mellitus (Mother, Sibling)    Family history of hypertension (Mother, Sibling)    Family history of hyperlipidemia (Mother, Sibling)        Social History:  Lives at home  Denies alcohol intake, smoking, and illicit drug use (17 Sep 2021 01:11)    Allergies    No Known Allergies    MEDICATIONS  (STANDING):  ampicillin/sulbactam  IVPB 3 Gram(s) IV Intermittent every 12 hours  atorvastatin 80 milliGRAM(s) Oral at bedtime  bisacodyl Suppository 10 milliGRAM(s) Rectal once  cyanocobalamin 1000 MICROGram(s) Oral daily  ergocalciferol 47344 Unit(s) Oral <User Schedule>  ferrous    sulfate 325 milliGRAM(s) Oral daily  fluconAZOLE IVPB      fluconAZOLE IVPB 100 milliGRAM(s) IV Intermittent every 24 hours  heparin   Injectable 5000 Unit(s) SubCutaneous every 8 hours  influenza   Vaccine 0.5 milliLiter(s) IntraMuscular once  insulin lispro (ADMELOG) corrective regimen sliding scale   SubCutaneous Before meals and at bedtime  iohexol 300 mG (iodine)/mL Oral Solution 30 milliLiter(s) Oral once  levoFLOXacin  Tablet 250 milliGRAM(s) Oral every 48 hours  metoprolol succinate  milliGRAM(s) Oral daily  NIFEdipine XL 60 milliGRAM(s) Oral daily  polyethylene glycol 3350 17 Gram(s) Oral daily  senna 2 Tablet(s) Oral at bedtime  tamsulosin 0.4 milliGRAM(s) Oral at bedtime    MEDICATIONS  (PRN):  HYDROmorphone  Injectable 0.5 milliGRAM(s) IV Push daily PRN Severe Pain (7 - 10)  ondansetron Injectable 4 milliGRAM(s) IV Push three times a day PRN Nausea and/or Vomiting      Vital Signs Last 24 Hrs  T(C): 36.1 (27 Sep 2021 04:55), Max: 36.7 (26 Sep 2021 20:09)  T(F): 97 (27 Sep 2021 04:55), Max: 98 (26 Sep 2021 20:09)  HR: 89 (27 Sep 2021 04:55) (87 - 95)  BP: 120/63 (27 Sep 2021 04:55) (108/54 - 126/59)  BP(mean): --  RR: 19 (27 Sep 2021 04:55) (19 - 20)  SpO2: 98% (27 Sep 2021 04:55) (87% - 98%)  COVID-19 PCR: NotDetec (21 Sep 2021 10:54)  COVID-19 PCR: NotDetec (19 Sep 2021 15:05)  COVID-19 PCR: NotDetec (16 Sep 2021 22:24)  COVID-19 PCR: NotDetec (18 Aug 2021 18:23)  COVID-19 PCR: NotDetec (14 Aug 2021 09:41)    LABS: Reviewed                          9.6    9.09  )-----------( 302      ( 27 Sep 2021 06:17 )             29.2     09-27    142  |  109<H>  |  49<H>  ----------------------------<  158<H>  3.9   |  21<L>  |  3.19<H>    Ca    8.5      27 Sep 2021 06:17    TPro  7.1  /  Alb  2.4<L>  /  TBili  0.4  /  DBili  x   /  AST  20  /  ALT  14  /  AlkPhos  103  09-27      LIVER FUNCTIONS - ( 27 Sep 2021 06:17 )  Alb: 2.4 g/dL / Pro: 7.1 g/dL / ALK PHOS: 103 U/L / ALT: 14 U/L DA / AST: 20 U/L / GGT: x             CAPILLARY BLOOD GLUCOSE      POCT Blood Glucose.: 151 mg/dL (27 Sep 2021 08:19)  POCT Blood Glucose.: 190 mg/dL (26 Sep 2021 20:46)  POCT Blood Glucose.: 184 mg/dL (26 Sep 2021 16:41)    COVID-19 PCR: NotDetec (21 Sep 2021 10:54)  COVID-19 PCR: NotDetec (19 Sep 2021 15:05)  COVID-19 PCR: NotDetec (16 Sep 2021 22:24)  COVID-19 PCR: NotDetec (18 Aug 2021 18:23)  COVID-19 PCR: NotDetec (14 Aug 2021 09:41)    Radiology: Reviewed  < from: MR Foot No Cont, Right (09.17.21 @ 13:04) >    EXAM:  MR FOOT RT                            PROCEDURE DATE:  09/17/2021          INTERPRETATION:  Clinical Information: Recent fifth metatarsal head resection now with fifth digit pain, swelling and foul discharge.    Comparison: Radiographs of the right foot from 9/16/2021 and MRI the right foot from 8/16/2021.    Technique:  MRI of the right midfoot and forefoot.  Intravenous Contrast: None.    Findings:    There is a resection at the mid aspect of the fifth metatarsal shaft. There is a lateral soft tissue wound beginning at the level of the amputation which extends distally. There is susceptibility artifact in the region of the amputation consistent with postoperative change. There is hyperintense T2 marrow signal throughout the remaining fifth metatarsal and within the adjacent fourth metatarsal head which is nonspecific and could be related to recent postoperative changes although osteomyelitis is suspected.    There is edema and mild atrophy within the plantar muscles of the foot. There is minimal spurring at the first metatarsophalangeal and hallux sesamoid articulations.    Impression:  Resection at the mid aspect of the fifth metatarsal. Lateral soft tissue wound with marrow signal abnormality throughout the fifth metatarsal and within the adjacent fourth metatarsal head which is nonspecific in the setting of recent surgery although osteomyelitis is suspected.    --- End of Report ---            AMIE ORTIZ MD; Attending Radiologist  This document has been electronically signed. Sep 17 2021  1:49PM    < end of copied text >    < from: US Kidney and Bladder (09.23.21 @ 10:08) >    EXAM:  US KIDNEYS AND BLADDER                            PROCEDURE DATE:  09/23/2021          INTERPRETATION:  CLINICAL INFORMATION: Acute kidney injury. Urinary retention.    COMPARISON: 8/16/2021    TECHNIQUE: Sonography of the kidneys and bladder.    FINDINGS:    Right kidney: 11.7 cm. No renal mass, hydronephrosis or calculi. The right kidney is hyperechoic.    Left kidney: 10.9 cm. No renal mass, hydronephrosis or calculi. The left kidney is hyperechoic.    Urinary bladder: The bladder hasa volume of 520 cc which is largely distended. The bladder is otherwise grossly unremarkable. The patient has no urge to void.    IMPRESSION:    No hydronephrosis.    Both kidneys are hyperechoic for which clinical correlation with intrinsic medical renal disease is recommended.    Largely distended urinary bladder.    --- End of Report ---            RENETTA PERKINS MD; Attending Radiologist  This document has been electronically signed. Sep 23 2021 10:40AM    < end of copied text >      ORT Score -   Family Hx of substance abuse	Female	      Male  Alcohol 	                                           1                     3  Illegal drugs	                                   2                     3  Rx drugs                                           4 	                  4  Personal Hx of substance abuse		  Alcohol 	                                          3	                  3  Illegal drugs                                     4	                  4  Rx drugs                                            5 	                  5  Age between 16- 45 years	           1                     1  hx preadolescent sexual abuse	   3 	                  0  Psychological disease		  ADD, OCD, bipolar, schizophrenia   2	          2  Depression                                           1 	          1  Total: 0    a score of 3 or lower indicates low risk for opioid abuse		  a score of 4-7 indicates moderate risk for opioid abuse		  a score of 8 or higher indicates high risk for opioid abuse    REVIEW OF SYSTEMS:  CONSTITUTIONAL: No fever + fatigue  HEENT:  + [place], no change in vision  NECK: No pain or stiffness  RESPIRATORY: + intermittent wet cough noted;  No, wheezing, chills or hemoptysis; No shortness of breath  CARDIOVASCULAR: No chest pain, palpitations, dizziness, or leg swelling  GASTROINTESTINAL: No loss of appetite, decreased PO intake. No abdominal or epigastric pain. No nausea/ vomiting; No diarrhea or constipation.   GENITOURINARY: + urinary retention.   MUSCULOSKELETAL: + right foot pain, no swelling; + generalized weakness; no falls   NEURO: No headaches, No numbness/tingling b/l LE  PSYCHIATRIC: No depression, anxiety or difficulty sleeping    PHYSICAL EXAM:  GENERAL:  Alert & Oriented X3, lethargic, cooperative, NAD, Speech is soft.   RESPIRATORY: Respirations even and unlabored. + intermittent wet cough noted; Diminished to auscultation bilaterally; No rales, rhonchi, wheezing, or rubs; On O2 via nonrebreather; Continuous pulse ox monitor on.  CARDIOVASCULAR: Normal S1/S2, regular rate and rhythm; + systolic murmur, No rubs, or gallops. No JVD.   GASTROINTESTINAL:  Soft, Nontender, + distended; Bowel sounds present  GENITOURINARY: Urinary catheter draining small amount of clear yellow urine.   PERIPHERAL VASCULAR: + right foot wound dressing c/d/i; Extremities warm without edema. 2+ radial Pulses, and 1+left pedal pulse, No cyanosis, No calf tenderness  MUSCULOSKELETAL: Motor Strength 4/5 B/L upper and 3/5 left lower extremities; + right LE + toe wiggle, right foot elevated on pillow; moves all extremities equally against gravity; ROM decreased b/l LE; + right  to palpation.   SKIN: + right foot wound ace dressing c/d/i connected to wound vac. b/l hand mittens restraints in place.     Risk factors associated with adverse outcomes related to opioid treatment  [ ]  Concurrent benzodiazepine use  [ ]  History/ Active substance use or alcohol use disorder  [ ] Psychiatric co-morbidity  [ ] Sleep apnea  [ ] COPD  [ ] BMI> 35  [ ] Liver dysfunction  [X ] Renal dysfunction  [ ] CHF  [ ] Smoker  [X ]  Age > 60 years    [X ]  [place]  Reviewed and Copied to Chart. See below.    Plan of care and goal oriented pain management treatment options were discussed with patient and /or primary care giver; all questions and concerns were addressed and care was aligned with patient's wishes.    Educated patient on goal oriented pain management treatment options     09-27-21 @ 11:32

## 2021-09-27 NOTE — PROGRESS NOTE ADULT - ASSESSMENT
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  As per daughter, patient was taking tylenol which did not help his pain prompting the patient to ask his daughter to take him to the hospital. ON admission, he has no fever or Leukocytosis. The Xray of Right foot shows no Osteomyelitis but MRI of Right foot shows Lateral soft tissue wound with marrow signal abnormality throughout the fifth metatarsal which is consistent of Osteomyelitis. He has seen by Podiatry and wound culture has sent, Zosyn and Vancomycin has started. The ID consult requested to assist with further evaluation and antibiotic management.     # Right Fifth metatarsal DFU with drainage and Osteomyelitis- wound cx grew Enterococcus, Aeromonas and Klebsiella - Zosyn is the ideal to cover All organisms but kidney function is worsening, hence change to Unasyn and Levaquin until kidney function is improved   # OREN- s/p urinary retention -s/p ibrahim catheter - s/p discontinue ACEI  # RLL pneumonia- most likely Aspiration, S/p Vomiting - On Unasyn  # Large bowel distension    would recommend:    1. NGT suctioned  as per primary/Surgery  team  2. Monitor  kidney function, started to improve slowly   3. Follow up pathology result for clear margin, if clear margin then then 7 days of oral abx, otherwise 6 weeks of IV ABx  4. Continue Adjusted doses Levaquin and  Unasyn based on crcl  5. Wound care as per Podiatry  6. IVF and taper off pain medications since become confused    d/w Nursing staff     Attending Attestation:    Spent more than 35 minutes on total encounter, more than 50 % of the visit was spent counseling and/or coordinating care by the Attending physician. 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  As per daughter, patient was taking tylenol which did not help his pain prompting the patient to ask his daughter to take him to the hospital. ON admission, he has no fever or Leukocytosis. The Xray of Right foot shows no Osteomyelitis but MRI of Right foot shows Lateral soft tissue wound with marrow signal abnormality throughout the fifth metatarsal which is consistent of Osteomyelitis. He has seen by Podiatry and wound culture has sent, Zosyn and Vancomycin has started. The ID consult requested to assist with further evaluation and antibiotic management.     # Right Fifth metatarsal DFU with drainage and Osteomyelitis- wound cx grew Enterococcus, Aeromonas and Klebsiella - Zosyn is the ideal to cover All organisms but kidney function is worsening, hence change to Unasyn and Levaquin until kidney function is improved   # OREN- s/p urinary retention -s/p ibrahim catheter - s/p discontinue ACEI  # RLL pneumonia- most likely Aspiration, S/p Vomiting - On Unasyn  # Large bowel distension    would recommend:    1. Advanced diet as tolerated and wean off oxygen as tolerated   2. Monitor  kidney function, started to improve slowly   3. Follow up pathology result for clear margin, if clear margin then need 7 days of oral abx, otherwise 6 weeks of IV ABx  4. Continue Adjusted doses Levaquin and  Unasyn based on crcl  5. Wound care as per Podiatry  6. Taper off pain medications since become confused    d/w Nursing staff     Attending Attestation:    Spent more than 35 minutes on total encounter, more than 50 % of the visit was spent counseling and/or coordinating care by the Attending physician.

## 2021-09-27 NOTE — PROGRESS NOTE ADULT - ASSESSMENT
A:   s/p R 5th ray resection - 8/19  s/p R 5th wound debridement, graft application, bone biopsy and wound vac application 9/22     P:  Patient evaluated, chart reviewed  Xrays reviewed  ESR/CRP reviewed   MRI reviewed   Intra-op culture no growth as above  intra-op bone biopsy pending, will f/u  Wound Vac changed today (9/27)- adaptic/ wound vac. Adaptic applied to dorsal foot wound. Dressed with webril/ACE   Recommend elevation of b/l legs   Applied CAIR boots, recommend cair boots at all times to prevent breakdown   Continue local wound care to allow graft incorporation  Appreciate ID consult- continue abx per ID   Podiatry will follow while in house  Discussed with Dr. Vazquez

## 2021-09-27 NOTE — PROGRESS NOTE ADULT - PROBLEM SELECTOR PLAN 3
AXR with colonic distention, hyperactive bowel sounds  did not tolerate oral laxatives  will attempt dulcolax  CT A/P when patient can tolerate, cannot lie flat at this time   f/u lactate   surgery onboard as concern for bowel obstruction

## 2021-09-28 DIAGNOSIS — R14.0 ABDOMINAL DISTENSION (GASEOUS): ICD-10-CM

## 2021-09-28 LAB
ALBUMIN SERPL ELPH-MCNC: 2.3 G/DL — LOW (ref 3.5–5)
ALP SERPL-CCNC: 101 U/L — SIGNIFICANT CHANGE UP (ref 40–120)
ALT FLD-CCNC: 15 U/L DA — SIGNIFICANT CHANGE UP (ref 10–60)
ANION GAP SERPL CALC-SCNC: 8 MMOL/L — SIGNIFICANT CHANGE UP (ref 5–17)
AST SERPL-CCNC: 20 U/L — SIGNIFICANT CHANGE UP (ref 10–40)
BASOPHILS # BLD AUTO: 0.02 K/UL — SIGNIFICANT CHANGE UP (ref 0–0.2)
BASOPHILS NFR BLD AUTO: 0.3 % — SIGNIFICANT CHANGE UP (ref 0–2)
BILIRUB SERPL-MCNC: 0.5 MG/DL — SIGNIFICANT CHANGE UP (ref 0.2–1.2)
BUN SERPL-MCNC: 39 MG/DL — HIGH (ref 7–18)
CALCIUM SERPL-MCNC: 8.7 MG/DL — SIGNIFICANT CHANGE UP (ref 8.4–10.5)
CHLORIDE SERPL-SCNC: 109 MMOL/L — HIGH (ref 96–108)
CK MB BLD-MCNC: 1.8 % — SIGNIFICANT CHANGE UP (ref 0–3.5)
CK MB CFR SERPL CALC: 1 NG/ML — SIGNIFICANT CHANGE UP (ref 0–3.6)
CK SERPL-CCNC: 56 U/L — SIGNIFICANT CHANGE UP (ref 35–232)
CO2 SERPL-SCNC: 27 MMOL/L — SIGNIFICANT CHANGE UP (ref 22–31)
CREAT SERPL-MCNC: 2.24 MG/DL — HIGH (ref 0.5–1.3)
EOSINOPHIL # BLD AUTO: 0.24 K/UL — SIGNIFICANT CHANGE UP (ref 0–0.5)
EOSINOPHIL NFR BLD AUTO: 3.5 % — SIGNIFICANT CHANGE UP (ref 0–6)
GLUCOSE BLDC GLUCOMTR-MCNC: 125 MG/DL — HIGH (ref 70–99)
GLUCOSE BLDC GLUCOMTR-MCNC: 134 MG/DL — HIGH (ref 70–99)
GLUCOSE BLDC GLUCOMTR-MCNC: 136 MG/DL — HIGH (ref 70–99)
GLUCOSE BLDC GLUCOMTR-MCNC: 165 MG/DL — HIGH (ref 70–99)
GLUCOSE SERPL-MCNC: 124 MG/DL — HIGH (ref 70–99)
HCT VFR BLD CALC: 29.7 % — LOW (ref 39–50)
HGB BLD-MCNC: 9.7 G/DL — LOW (ref 13–17)
IMM GRANULOCYTES NFR BLD AUTO: 0.9 % — SIGNIFICANT CHANGE UP (ref 0–1.5)
LYMPHOCYTES # BLD AUTO: 0.87 K/UL — LOW (ref 1–3.3)
LYMPHOCYTES # BLD AUTO: 12.6 % — LOW (ref 13–44)
MAGNESIUM SERPL-MCNC: 2.3 MG/DL — SIGNIFICANT CHANGE UP (ref 1.6–2.6)
MCHC RBC-ENTMCNC: 29.4 PG — SIGNIFICANT CHANGE UP (ref 27–34)
MCHC RBC-ENTMCNC: 32.7 GM/DL — SIGNIFICANT CHANGE UP (ref 32–36)
MCV RBC AUTO: 90 FL — SIGNIFICANT CHANGE UP (ref 80–100)
MONOCYTES # BLD AUTO: 0.37 K/UL — SIGNIFICANT CHANGE UP (ref 0–0.9)
MONOCYTES NFR BLD AUTO: 5.4 % — SIGNIFICANT CHANGE UP (ref 2–14)
NEUTROPHILS # BLD AUTO: 5.33 K/UL — SIGNIFICANT CHANGE UP (ref 1.8–7.4)
NEUTROPHILS NFR BLD AUTO: 77.3 % — HIGH (ref 43–77)
NRBC # BLD: 0 /100 WBCS — SIGNIFICANT CHANGE UP (ref 0–0)
PHOSPHATE SERPL-MCNC: 3 MG/DL — SIGNIFICANT CHANGE UP (ref 2.5–4.5)
PLATELET # BLD AUTO: 314 K/UL — SIGNIFICANT CHANGE UP (ref 150–400)
POTASSIUM SERPL-MCNC: 3.7 MMOL/L — SIGNIFICANT CHANGE UP (ref 3.5–5.3)
POTASSIUM SERPL-SCNC: 3.7 MMOL/L — SIGNIFICANT CHANGE UP (ref 3.5–5.3)
PROT SERPL-MCNC: 7.1 G/DL — SIGNIFICANT CHANGE UP (ref 6–8.3)
RBC # BLD: 3.3 M/UL — LOW (ref 4.2–5.8)
RBC # FLD: 13.9 % — SIGNIFICANT CHANGE UP (ref 10.3–14.5)
SODIUM SERPL-SCNC: 144 MMOL/L — SIGNIFICANT CHANGE UP (ref 135–145)
TROPONIN I SERPL-MCNC: 1.51 NG/ML — HIGH (ref 0–0.04)
WBC # BLD: 6.89 K/UL — SIGNIFICANT CHANGE UP (ref 3.8–10.5)
WBC # FLD AUTO: 6.89 K/UL — SIGNIFICANT CHANGE UP (ref 3.8–10.5)

## 2021-09-28 PROCEDURE — 71045 X-RAY EXAM CHEST 1 VIEW: CPT | Mod: 26

## 2021-09-28 PROCEDURE — 76705 ECHO EXAM OF ABDOMEN: CPT | Mod: 26,RT

## 2021-09-28 PROCEDURE — 99232 SBSQ HOSP IP/OBS MODERATE 35: CPT

## 2021-09-28 RX ORDER — ASPIRIN/CALCIUM CARB/MAGNESIUM 324 MG
81 TABLET ORAL DAILY
Refills: 0 | Status: DISCONTINUED | OUTPATIENT
Start: 2021-09-28 | End: 2021-10-06

## 2021-09-28 RX ORDER — PANTOPRAZOLE SODIUM 20 MG/1
40 TABLET, DELAYED RELEASE ORAL
Refills: 0 | Status: DISCONTINUED | OUTPATIENT
Start: 2021-09-28 | End: 2021-10-01

## 2021-09-28 RX ADMIN — AMPICILLIN SODIUM AND SULBACTAM SODIUM 200 GRAM(S): 250; 125 INJECTION, POWDER, FOR SUSPENSION INTRAMUSCULAR; INTRAVENOUS at 17:25

## 2021-09-28 RX ADMIN — Medication 325 MILLIGRAM(S): at 17:24

## 2021-09-28 RX ADMIN — ATORVASTATIN CALCIUM 80 MILLIGRAM(S): 80 TABLET, FILM COATED ORAL at 21:27

## 2021-09-28 RX ADMIN — Medication 30 MILLILITER(S): at 17:21

## 2021-09-28 RX ADMIN — Medication 1: at 17:15

## 2021-09-28 RX ADMIN — Medication 100 MILLIGRAM(S): at 05:39

## 2021-09-28 RX ADMIN — ONDANSETRON 4 MILLIGRAM(S): 8 TABLET, FILM COATED ORAL at 15:47

## 2021-09-28 RX ADMIN — PANTOPRAZOLE SODIUM 40 MILLIGRAM(S): 20 TABLET, DELAYED RELEASE ORAL at 21:27

## 2021-09-28 RX ADMIN — SENNA PLUS 2 TABLET(S): 8.6 TABLET ORAL at 21:28

## 2021-09-28 RX ADMIN — PANTOPRAZOLE SODIUM 40 MILLIGRAM(S): 20 TABLET, DELAYED RELEASE ORAL at 14:46

## 2021-09-28 RX ADMIN — HEPARIN SODIUM 5000 UNIT(S): 5000 INJECTION INTRAVENOUS; SUBCUTANEOUS at 14:46

## 2021-09-28 RX ADMIN — POLYETHYLENE GLYCOL 3350 17 GRAM(S): 17 POWDER, FOR SOLUTION ORAL at 17:24

## 2021-09-28 RX ADMIN — TAMSULOSIN HYDROCHLORIDE 0.4 MILLIGRAM(S): 0.4 CAPSULE ORAL at 21:27

## 2021-09-28 RX ADMIN — HEPARIN SODIUM 5000 UNIT(S): 5000 INJECTION INTRAVENOUS; SUBCUTANEOUS at 05:39

## 2021-09-28 RX ADMIN — Medication 60 MILLIGRAM(S): at 05:39

## 2021-09-28 RX ADMIN — HEPARIN SODIUM 5000 UNIT(S): 5000 INJECTION INTRAVENOUS; SUBCUTANEOUS at 21:27

## 2021-09-28 RX ADMIN — PREGABALIN 1000 MICROGRAM(S): 225 CAPSULE ORAL at 17:23

## 2021-09-28 RX ADMIN — FLUCONAZOLE 100 MILLIGRAM(S): 150 TABLET ORAL at 21:26

## 2021-09-28 RX ADMIN — AMPICILLIN SODIUM AND SULBACTAM SODIUM 200 GRAM(S): 250; 125 INJECTION, POWDER, FOR SUSPENSION INTRAMUSCULAR; INTRAVENOUS at 05:39

## 2021-09-28 NOTE — PROGRESS NOTE ADULT - PROBLEM SELECTOR PLAN 6
poorly controlled  A1C 11.4  Lantus QHS, ISS as per protocol  accuchecks AC, HS OREN superimposed on Stage III CKD, improving  concern for AIN 2/2 antibiotics   Creatinine down-trending  hold off on IVF as CXR with pulmonary edema  avoid nephrotoxic agents, dose as per GFR  monitor creatinine, electrolytes  holding Lisinopril, Nifedipine incr. 90 mg daily   f/u C3/ C4; ASO negative  Urine eosinophils negative  renal ultrasound negative for hydronephrosis, hyperechoic kidneys significant for medical renal disease  UO improving, Cr mildly improving  Strict I&O, if Cr improving will perform TOV  Nephrology Dr. Rodriguez consulted

## 2021-09-28 NOTE — PROGRESS NOTE ADULT - ASSESSMENT
76 year old male with past medical history of poorly controlled IDDM, HTN, HLD, stage III CKD, CAD s/p CABG and recent right partial 5th ray amputation (8/19/21) who presented to ED with c/o right foot pain associated with discharge and foul odor. He tried taking Tylenol for the pain with minimal relief. Of note, the patient never followed up with podiatry after his procedure in August. MRI confirms suspected osteomyelitis - patient followed by podiatry and ID, patient undergo debridement and wound VAC placement. Patient currently on course of Unasyn and Levofloxacin, ID to follow up final surgical path. 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 Nephrology Dr. Rodriguez following. Hospital course complicated by abdominal distention and constipation for which he received lactulose and vomited. The patient had subsequent respiratory distress and it is suspected he aspirated as CXR showed possible RLL PNA. AXR shows distended loops of bowel for which NG tube was placed which the patient self d/c'd overnight 9/26 - re-attempts to replace NG tube were unsuccessful as patient was non-cooperative. Surgery following. CT abdomen deferred for now as patient is unstable and will not tolerate lying flat. ICU consulted given patient's deterioration in clinical status.       --- acute pulmonary edema, troponin elevated-- likely demand ischemia, BNP > 20,000 : transfer to telemetry

## 2021-09-28 NOTE — PROGRESS NOTE ADULT - PROBLEM SELECTOR PLAN 4
patient not voiding for last 24 hours  possible cause of ARF  RBUS with hypoechoic kidneys and 500 mL bladder  s/p ibrahim catheter   c/w flomax  monitor UO and Cr, if improving may attempt TOV in a few days  Nephrology dr. Rodriguez AXR with colonic distention, hyperactive bowel sounds  Had 2 bowel movements last night  CT A/P shows distended gallbladder  RUQ US with no acute cholecystitis  Surgery recommending no intervention at this time, GI consulted Dr. Vick

## 2021-09-28 NOTE — PROGRESS NOTE ADULT - SUBJECTIVE AND OBJECTIVE BOX
`Patient is a 76y old  Male who presents with a chief complaint of R Foot Pain (28 Sep 2021 07:59)    PATIENT IS SEEN AND EXAMINED IN MEDICAL FLOOR.  JOCELYN [    ]    MADDY [   ]      GT [   ]    ALLERGIES:  No Known Allergies      Daily     Daily Weight in k.3 (28 Sep 2021 04:57)    VITALS:    Vital Signs Last 24 Hrs  T(C): 36.7 (28 Sep 2021 07:32), Max: 37.1 (27 Sep 2021 13:37)  T(F): 98 (28 Sep 2021 07:32), Max: 98.7 (27 Sep 2021 13:37)  HR: 65 (28 Sep 2021 07:32) (65 - 103)  BP: 169/79 (28 Sep 2021 07:32) (114/60 - 169/79)  BP(mean): --  RR: 18 (28 Sep 2021 07:32) (18 - 20)  SpO2: 98% (28 Sep 2021 07:32) (91% - 98%)    LABS:    CBC Full  -  ( 28 Sep 2021 07:39 )  WBC Count : 6.89 K/uL  RBC Count : 3.30 M/uL  Hemoglobin : 9.7 g/dL  Hematocrit : 29.7 %  Platelet Count - Automated : 314 K/uL  Mean Cell Volume : 90.0 fl  Mean Cell Hemoglobin : 29.4 pg  Mean Cell Hemoglobin Concentration : 32.7 gm/dL  Auto Neutrophil # : 5.33 K/uL  Auto Lymphocyte # : 0.87 K/uL  Auto Monocyte # : 0.37 K/uL  Auto Eosinophil # : 0.24 K/uL  Auto Basophil # : 0.02 K/uL  Auto Neutrophil % : 77.3 %  Auto Lymphocyte % : 12.6 %  Auto Monocyte % : 5.4 %  Auto Eosinophil % : 3.5 %  Auto Basophil % : 0.3 %          144  |  109<H>  |  39<H>  ----------------------------<  124<H>  3.7   |  27  |  2.24<H>    Ca    8.7      28 Sep 2021 07:39  Phos  3.0     -  Mg     2.3     -    TPro  7.1  /  Alb  2.3<L>  /  TBili  0.5  /  DBili  x   /  AST  20  /  ALT  15  /  AlkPhos  101      CAPILLARY BLOOD GLUCOSE      POCT Blood Glucose.: 134 mg/dL (28 Sep 2021 07:50)  POCT Blood Glucose.: 170 mg/dL (27 Sep 2021 20:56)  POCT Blood Glucose.: 161 mg/dL (27 Sep 2021 16:57)  POCT Blood Glucose.: 140 mg/dL (27 Sep 2021 11:40)    CARDIAC MARKERS ( 27 Sep 2021 19:41 )  1.220 ng/mL / x     / x     / x     / x      CARDIAC MARKERS ( 27 Sep 2021 11:04 )  1.460 ng/mL / x     / x     / x     / x          LIVER FUNCTIONS - ( 28 Sep 2021 07:39 )  Alb: 2.3 g/dL / Pro: 7.1 g/dL / ALK PHOS: 101 U/L / ALT: 15 U/L DA / AST: 20 U/L / GGT: x           Creatinine Trend: 2.24<--, 3.19<--, 3.83<--, 4.05<--, 3.97<--, 3.20<--  I&O's Summary    27 Sep 2021 07:01  -  28 Sep 2021 07:00  --------------------------------------------------------  IN: 0 mL / OUT: 1850 mL / NET: -1850 mL        ABG - ( 27 Sep 2021 10:19 )  pH, Arterial: 7.46  pH, Blood: x     /  pCO2: 30    /  pO2: 81    / HCO3: 21    / Base Excess: -1.5  /  SaO2: 98                  .Tissue Right 5th toe r/o osteomyeltis   @ 13:54   No growth at 5 days  --    No polymorphonuclear cells seen per low power field  No organisms seen per oil power field      .Blood Blood-Venous   @ 10:28   No Growth Final  --  --      .Surgical Swab right foot wound   @ 21:42   Culture yields >4 types of aerobic and/or anaerobic bacteria  Call client services within 7 days if further workup is clinically  indicated. Culture includes  Rare Aeromonas hydrophila/caviae  Few Klebsiella oxytoca/Raoutella ornithinolytica  Few Enterococcus faecalis  Few Streptococcus agalactiae (Group B) isolated  Group B streptococci are susceptible to ampicillin,  penicillin and cefazolin, but may be resistant to  erythromycin and clindamycin.  Recommendations for intrapartum prophylaxis for Group B  streptococci are penicillin or ampicillin.  --  Aeromonas hydrophila/caviae  Klebsiella oxytoca /Raoutella ornithinolytica  Enterococcus faecalis      Bone R 5th metatarsal bone clean ma   @ 03:59   No growth at 5 days  --    No polymorphonuclear leukocytes seen per low power field  No organisms seen per oil power field      .Blood Blood-Venous   @ 21:09   No Growth Final  --  --      .Surgical Swab Right foot plantar wound   @ 21:08   Moderate Streptococcus agalactiae (Group B) isolated  Group B streptococci are susceptible to ampicillin,  penicillin and cefazolin, but may be resistant to  erythromycin and clindamycin.  Recommendations for intrapartum prophylaxis for Group B  streptococci are penicillin or ampicillin.  Moderate Bacteroides fragilis "Susceptibilities not performed"  Normal skin filiberto isolated  --  --          MEDICATIONS:    MEDICATIONS  (STANDING):  ampicillin/sulbactam  IVPB 3 Gram(s) IV Intermittent every 12 hours  atorvastatin 80 milliGRAM(s) Oral at bedtime  bisacodyl Suppository 10 milliGRAM(s) Rectal once  cyanocobalamin 1000 MICROGram(s) Oral daily  ergocalciferol 09070 Unit(s) Oral <User Schedule>  ferrous    sulfate 325 milliGRAM(s) Oral daily  fluconAZOLE IVPB      fluconAZOLE IVPB 100 milliGRAM(s) IV Intermittent every 24 hours  heparin   Injectable 5000 Unit(s) SubCutaneous every 8 hours  influenza   Vaccine 0.5 milliLiter(s) IntraMuscular once  insulin lispro (ADMELOG) corrective regimen sliding scale   SubCutaneous Before meals and at bedtime  levoFLOXacin  Tablet 250 milliGRAM(s) Oral every 48 hours  metoprolol succinate  milliGRAM(s) Oral daily  NIFEdipine XL 60 milliGRAM(s) Oral daily  polyethylene glycol 3350 17 Gram(s) Oral daily  senna 2 Tablet(s) Oral at bedtime  tamsulosin 0.4 milliGRAM(s) Oral at bedtime      MEDICATIONS  (PRN):  ondansetron Injectable 4 milliGRAM(s) IV Push three times a day PRN Nausea and/or Vomiting      REVIEW OF SYSTEMS:                           ALL ROS DONE [ X   ]    CONSTITUTIONAL:  LETHARGIC [   ], FEVER [   ], UNRESPONSIVE [   ]  CVS:  CP  [   ], SOB, [   ], PALPITATIONS [   ], DIZZYNESS [   ]  RS: COUGH [   ], SPUTUM [   ]  GI: ABDOMINAL PAIN [   ], NAUSEA [   ], VOMITINGS [   ], DIARRHEA [   ], CONSTIPATION [   ]  :  DYSURIA [   ], NOCTURIA [   ], INCREASED FREQUENCY [   ], DRIBLING [   ],  SKELETAL: PAINFUL JOINTS [   ], SWOLLEN JOINTS [   ], NECK ACHE [   ], LOW BACK ACHE [   ],  SKIN : ULCERS [   ], RASH [   ], ITCHING [   ]  CNS: HEAD ACHE [   ], DOUBLE VISION [   ], BLURRED VISION [   ], AMS / CONFUSION [   ], SEIZURES [   ], WEAKNESS [   ],TINGLING / NUMBNESS [   ]      PHYSICAL EXAMINATION:  GENERAL APPEARANCE: NO DISTRESS  HEENT:  NO PALLOR, NO  JVD,  NO   NODES, NECK SUPPLE  CVS: S1 +, S2 +,   RS: AEEB,  OCCASIONAL  RALES +,   NO RONCHI, CRACKLES +  ABD: SOFT, NT, NO, BS +     ;  DISTENDED +  EXT: NO PE  SKIN: WARM,   RIGHT FOOT WRAPPED W/ WOUND VAC IN PLACE  SKELETAL:  ROM ACCEPTABLE  CNS:  AAO X  2     RADIOLOGY :    EXAM:  CT ABDOMEN AND PELVIS OC                            PROCEDURE DATE:  2021          INTERPRETATION:  CLINICAL INFORMATION: Small bowel obstruction.    COMPARISON: None.    CONTRAST/COMPLICATIONS:  IV Contrast: NONE  Oral Contrast: Omnipaque 300  Complications: None reported at time of study completion    PROCEDURE:  CT of the Abdomen and Pelvis was performed.  Sagittal and coronal reformats were performed.    FINDINGS:  LOWER CHEST: Small bilateral pleural effusions with compressiveatelectasis of both lower lobes.    LIVER: Within normal limits.  BILE DUCTS: Normal caliber.  GALLBLADDER: Distended. Small layering gallstones.  SPLEEN: Within normal limits.  PANCREAS: Within normal limits.  ADRENALS: Within normal limits.  KIDNEYS/URETERS: No hydronephrosis. Nonobstructing left upper pole calculus, measuring 0.6 cm.    BLADDER: Urinary bladder contains air and a Castañeda catheter balloon.  REPRODUCTIVE ORGANS: Prostate is not enlarged.    BOWEL: No bowel obstruction. Appendix isnormal. Colonic diverticulosis.  PERITONEUM: Mild presacral fluid.  VESSELS: Atherosclerotic changes.  RETROPERITONEUM/LYMPH NODES: No lymphadenopathy.  ABDOMINAL WALL: Within normal limits.  BONES: Degenerative changes. Sternotomy.    IMPRESSION:  No acute intra-abdominal pathology.    Small bilateral pleural effusions with compressive atelectasis of both lower lobes.    ====================================================    EXAM:  MR FOOT RT                            PROCEDURE DATE:  2021          INTERPRETATION:  Clinical Information: Recent fifth metatarsal head resection now with fifth digit pain, swelling and foul discharge.    Comparison: Radiographs of the right foot from 2021 and MRI the right foot from 2021.    Technique:  MRI of the right midfoot and forefoot.  Intravenous Contrast: None.    Findings:    There is a resection at the mid aspect of the fifth metatarsal shaft. There is a lateral soft tissue wound beginning at the level of the amputation which extends distally. There is susceptibility artifact in the region of the amputation consistent with postoperative change. There is hyperintense T2 marrow signal throughout the remaining fifth metatarsal and within the adjacent fourth metatarsal head which is nonspecific and could be related to recent postoperative changes although osteomyelitis is suspected.    There is edema and mild atrophy within the plantar muscles of the foot. There is minimal spurring at the first metatarsophalangeal and hallux sesamoid articulations.    Impression:  Resection at the mid aspect of the fifth metatarsal. Lateral soft tissue wound with marrow signal abnormality throughout the fifth metatarsal and within the adjacent fourth metatarsal head which is nonspecific in the setting of recent surgery although osteomyelitis is suspected.        ASSESSMENT :     Headache    HTN (hypertension)    HLD (hyperlipidemia)    DM (diabetes mellitus)    CAD (coronary artery disease)    Glaucoma, angle-closure    [place] (hard of hearing)    No significant past surgical history    S/P CABG (coronary artery bypass graft)    History of thyroid surgery        PLAN:  HPI:  76M from home, lives with daughter w/ PMH DM on insulin, HTN, HLD, CAD, PSH R partial 5th ray amputation 2021 p/w R foot pain x1 day associated with foul smelling discharge. Pt with sharp pain on the R lateral foot. As per daughter, pt was taking tylenol which did not help his pain prompting the patient to ask his daughter to take him to the hospital. Pt is a poor historian. Daughter states that the patient did not see his podiatrist after the surgery. No fever, chest, pain, palpitations, nausea, vomiting, diarrhea (17 Sep 2021 01:11)    # SUSPECT RIGHT FOOT OSTEOMYELITIS S/P RIGHT 5TH RAY RESECTION [] AND S/P DEBRIDEMENT, GRAFT APPLICATION, BONE BX AND WOUND VAC APPLICATION   ** RECENT ADMISSION FOR RIGHT DIABETIC FOOT ULCER, CELLULITIS, OSTEOMYELITIS RIGHT 5th METATARSAL S/P RIGHT 5TH PARTIAL RAY AMPUTATION [] - BONE PATHOLOGY W/ CLEAN MARGINS    - S/P VANCOMYCIN AND ZOSYN ; NOW ON UNASYN AND LEVAQUIN GIVEN OREN  - RECENTLY HAD SIMON W/ MILD PAD  - REVIEWED WOUND CX [ ENTEROCOCCUS, AEROMONAS, KLEBSIELLA] AND BCX   - ID CONSULT, PODIATRY CONSULT    # ACUTE HYPOXIC RESPIRATORY FAILURE SUSPECT S/T PULMONARY EDEMA IN THE SETTING OF SEVERE AORTIC STENOSIS + ASPIRATION PNA - ON LEVAQUIN, STARTED LASIX, CARDIOLOGY CONSULT IN PROGRESS  - EVALUATED BY CRITICAL CARE THIS A.M. - PATIENT HAS SINCE IMPROVED  - F/U EKG, REPEAT CXR IN A.M.      # BOWEL DISTENTION - ? ILEUS - OPTIMIZING ELECTROLYTES, SURGERY CONSULT IN PROGRESS  - F/U LACTIC ACID  - PASSED 2 BMS ON     # ASPIRATION EVENT WHEN PATIENT REMOVED NGT - ON LEVAQUIN, ID CONSULT IN PROGRESS    # DISTENDED GB W/ CHOLELITHIASIS - F/U LFTS, F/U RUQ U/S, SURGERY CONSULT IN PROGRESS    # OREN ON CKD - S/T ATN AND URINARY RETENTION - S/P IVF, NOW W/ CASTAÑEDA, NEPHROLOGY CONUSLT IN PROGRESS    # MEDICAL CLEARANCE FOR SURGERY - RCRI - 2 - 10.1 % 30 DAY RISK OF DEATH, MI OR CARDIAC ARREST  - CARDIOLOGY CONSULT     # DM -  HBA1C - 11  - LANTUS, SSI + FS    # CAD S/P CABG - PLACED ON ASA, STATIN, BB  - ECHO - SEVERE AORTIC STENOSIS, SEVERE CONCENTRIC LVH, G1DD, MILD OK  - CARDIOLOGY CONSULT - DR. BASSETT    # HTN - ON METOPROLOL, NICARDIPINE    # SEVERE, ASYMPTOMATIC, STENOSIS - ONCE ACUTE ILLNESS RESOLVES, WILL LIKELY NEED ISCHEMIC WORKUP, SABIHA TO EVALUATE FURTHER. D/W PATIENT, DAUGHTER AND CARDIOLOGY TEAM [PREVIOUSLY SAW DR. BASSETT]    # VITAMIN D DEFICIENCY - ON CHOLECALCIFEROL    # HLD - STATIN    # CASE DISCUSSED AT LENGTH WITH PATIENT AND DAUGHTER, BIRDIE ROBERT AT BEDSIDE  # PATIENT AND DAUGHTER REFUSED [facility] ON PREVIOUS ADMISSION, PREVIOUSLY WISHED FOR PATIENT RETURN HOME W/ HOME PT; HOWEVER NOW, DAUGHTER WISHES FOR PATIENT TO GO TO [facility], AS PATIENT'S WIFE IS CURRENTLY ADMITTED THERE    # GI AND DVT PPX    Patient is a 76y old  Male who presents with a chief complaint of R Foot Pain (28 Sep 2021 07:59)    PATIENT IS SEEN AND EXAMINED IN MEDICAL FLOOR.    ALLERGIES:  No Known Allergies    Daily     Daily Weight in k.3 (28 Sep 2021 04:57)    VITALS:    Vital Signs Last 24 Hrs  T(C): 36.7 (28 Sep 2021 07:32), Max: 37.1 (27 Sep 2021 13:37)  T(F): 98 (28 Sep 2021 07:32), Max: 98.7 (27 Sep 2021 13:37)  HR: 65 (28 Sep 2021 07:32) (65 - 103)  BP: 169/79 (28 Sep 2021 07:32) (114/60 - 169/79)  BP(mean): --  RR: 18 (28 Sep 2021 07:32) (18 - 20)  SpO2: 98% (28 Sep 2021 07:32) (91% - 98%)    LABS:    CBC Full  -  ( 28 Sep 2021 07:39 )  WBC Count : 6.89 K/uL  RBC Count : 3.30 M/uL  Hemoglobin : 9.7 g/dL  Hematocrit : 29.7 %  Platelet Count - Automated : 314 K/uL  Mean Cell Volume : 90.0 fl  Mean Cell Hemoglobin : 29.4 pg  Mean Cell Hemoglobin Concentration : 32.7 gm/dL  Auto Neutrophil # : 5.33 K/uL  Auto Lymphocyte # : 0.87 K/uL  Auto Monocyte # : 0.37 K/uL  Auto Eosinophil # : 0.24 K/uL  Auto Basophil # : 0.02 K/uL  Auto Neutrophil % : 77.3 %  Auto Lymphocyte % : 12.6 %  Auto Monocyte % : 5.4 %  Auto Eosinophil % : 3.5 %  Auto Basophil % : 0.3 %          144  |  109<H>  |  39<H>  ----------------------------<  124<H>  3.7   |  27  |  2.24<H>    Ca    8.7      28 Sep 2021 07:39  Phos  3.0       Mg     2.3         TPro  7.1  /  Alb  2.3<L>  /  TBili  0.5  /  DBili  x   /  AST  20  /  ALT  15  /  AlkPhos  101      CAPILLARY BLOOD GLUCOSE      POCT Blood Glucose.: 134 mg/dL (28 Sep 2021 07:50)  POCT Blood Glucose.: 170 mg/dL (27 Sep 2021 20:56)  POCT Blood Glucose.: 161 mg/dL (27 Sep 2021 16:57)  POCT Blood Glucose.: 140 mg/dL (27 Sep 2021 11:40)    CARDIAC MARKERS ( 27 Sep 2021 19:41 )  1.220 ng/mL / x     / x     / x     / x      CARDIAC MARKERS ( 27 Sep 2021 11:04 )  1.460 ng/mL / x     / x     / x     / x          LIVER FUNCTIONS - ( 28 Sep 2021 07:39 )  Alb: 2.3 g/dL / Pro: 7.1 g/dL / ALK PHOS: 101 U/L / ALT: 15 U/L DA / AST: 20 U/L / GGT: x           Creatinine Trend: 2.24<--, 3.19<--, 3.83<--, 4.05<--, 3.97<--, 3.20<--  I&O's Summary    27 Sep 2021 07:01  -  28 Sep 2021 07:00  --------------------------------------------------------  IN: 0 mL / OUT: 1850 mL / NET: -1850 mL        ABG - ( 27 Sep 2021 10:19 )  pH, Arterial: 7.46  pH, Blood: x     /  pCO2: 30    /  pO2: 81    / HCO3: 21    / Base Excess: -1.5  /  SaO2: 98                  .Tissue Right 5th toe r/o osteomyeltis   @ 13:54   No growth at 5 days  --    No polymorphonuclear cells seen per low power field  No organisms seen per oil power field      .Blood Blood-Venous   @ 10:28   No Growth Final  --  --      .Surgical Swab right foot wound   @ 21:42   Culture yields >4 types of aerobic and/or anaerobic bacteria  Call client services within 7 days if further workup is clinically  indicated. Culture includes  Rare Aeromonas hydrophila/caviae  Few Klebsiella oxytoca/Raoutella ornithinolytica  Few Enterococcus faecalis  Few Streptococcus agalactiae (Group B) isolated  Group B streptococci are susceptible to ampicillin,  penicillin and cefazolin, but may be resistant to  erythromycin and clindamycin.  Recommendations for intrapartum prophylaxis for Group B  streptococci are penicillin or ampicillin.  --  Aeromonas hydrophila/caviae  Klebsiella oxytoca /Raoutella ornithinolytica  Enterococcus faecalis      Bone R 5th metatarsal bone clean ma   @ 03:59   No growth at 5 days  --    No polymorphonuclear leukocytes seen per low power field  No organisms seen per oil power field      .Blood Blood-Venous   @ 21:09   No Growth Final  --  --      .Surgical Swab Right foot plantar wound   @ 21:08   Moderate Streptococcus agalactiae (Group B) isolated  Group B streptococci are susceptible to ampicillin,  penicillin and cefazolin, but may be resistant to  erythromycin and clindamycin.  Recommendations for intrapartum prophylaxis for Group B  streptococci are penicillin or ampicillin.  Moderate Bacteroides fragilis "Susceptibilities not performed"  Normal skin filiberto isolated  --  --          MEDICATIONS:    MEDICATIONS  (STANDING):  ampicillin/sulbactam  IVPB 3 Gram(s) IV Intermittent every 12 hours  atorvastatin 80 milliGRAM(s) Oral at bedtime  bisacodyl Suppository 10 milliGRAM(s) Rectal once  cyanocobalamin 1000 MICROGram(s) Oral daily  ergocalciferol 88687 Unit(s) Oral <User Schedule>  ferrous    sulfate 325 milliGRAM(s) Oral daily  fluconAZOLE IVPB      fluconAZOLE IVPB 100 milliGRAM(s) IV Intermittent every 24 hours  heparin   Injectable 5000 Unit(s) SubCutaneous every 8 hours  influenza   Vaccine 0.5 milliLiter(s) IntraMuscular once  insulin lispro (ADMELOG) corrective regimen sliding scale   SubCutaneous Before meals and at bedtime  levoFLOXacin  Tablet 250 milliGRAM(s) Oral every 48 hours  metoprolol succinate  milliGRAM(s) Oral daily  NIFEdipine XL 60 milliGRAM(s) Oral daily  polyethylene glycol 3350 17 Gram(s) Oral daily  senna 2 Tablet(s) Oral at bedtime  tamsulosin 0.4 milliGRAM(s) Oral at bedtime      MEDICATIONS  (PRN):  ondansetron Injectable 4 milliGRAM(s) IV Push three times a day PRN Nausea and/or Vomiting      REVIEW OF SYSTEMS:                           ALL ROS DONE [ X   ]    CONSTITUTIONAL:  LETHARGIC [   ], FEVER [   ], UNRESPONSIVE [   ]  CVS:  CP  [   ], SOB, [   ], PALPITATIONS [   ], DIZZYNESS [   ]  RS: COUGH [   ], SPUTUM [   ]  GI: ABDOMINAL PAIN [   ], NAUSEA [   ], VOMITINGS [   ], DIARRHEA [   ], CONSTIPATION [   ]  :  DYSURIA [   ], NOCTURIA [   ], INCREASED FREQUENCY [   ], DRIBLING [   ],  SKELETAL: PAINFUL JOINTS [   ], SWOLLEN JOINTS [   ], NECK ACHE [   ], LOW BACK ACHE [   ],  SKIN : ULCERS [   ], RASH [   ], ITCHING [   ]  CNS: HEAD ACHE [   ], DOUBLE VISION [   ], BLURRED VISION [   ], AMS / CONFUSION [   ], SEIZURES [   ], WEAKNESS [   ],TINGLING / NUMBNESS [   ]      PHYSICAL EXAMINATION:  GENERAL APPEARANCE: NO DISTRESS  HEENT:  NO PALLOR, NO  JVD,  NO   NODES, NECK SUPPLE  CVS: S1 +, S2 +,   RS: AEEB,  OCCASIONAL  RALES +,   NO RONCHI, CRACKLES +  ABD: SOFT, NT, NO, BS +     ;  DISTENDED +  EXT: NO PE  SKIN: WARM,   RIGHT FOOT WRAPPED W/ WOUND VAC IN PLACE  SKELETAL:  ROM ACCEPTABLE  CNS:  AAO X  2     RADIOLOGY :    EXAM:  CT ABDOMEN AND PELVIS OC                            PROCEDURE DATE:  2021          INTERPRETATION:  CLINICAL INFORMATION: Small bowel obstruction.    COMPARISON: None.    CONTRAST/COMPLICATIONS:  IV Contrast: NONE  Oral Contrast: Omnipaque 300  Complications: None reported at time of study completion    PROCEDURE:  CT of the Abdomen and Pelvis was performed.  Sagittal and coronal reformats were performed.    FINDINGS:  LOWER CHEST: Small bilateral pleural effusions with compressiveatelectasis of both lower lobes.    LIVER: Within normal limits.  BILE DUCTS: Normal caliber.  GALLBLADDER: Distended. Small layering gallstones.  SPLEEN: Within normal limits.  PANCREAS: Within normal limits.  ADRENALS: Within normal limits.  KIDNEYS/URETERS: No hydronephrosis. Nonobstructing left upper pole calculus, measuring 0.6 cm.    BLADDER: Urinary bladder contains air and a Castañeda catheter balloon.  REPRODUCTIVE ORGANS: Prostate is not enlarged.    BOWEL: No bowel obstruction. Appendix isnormal. Colonic diverticulosis.  PERITONEUM: Mild presacral fluid.  VESSELS: Atherosclerotic changes.  RETROPERITONEUM/LYMPH NODES: No lymphadenopathy.  ABDOMINAL WALL: Within normal limits.  BONES: Degenerative changes. Sternotomy.    IMPRESSION:  No acute intra-abdominal pathology.    Small bilateral pleural effusions with compressive atelectasis of both lower lobes.    ====================================================    EXAM:  MR FOOT RT                            PROCEDURE DATE:  2021          INTERPRETATION:  Clinical Information: Recent fifth metatarsal head resection now with fifth digit pain, swelling and foul discharge.    Comparison: Radiographs of the right foot from 2021 and MRI the right foot from 2021.    Technique:  MRI of the right midfoot and forefoot.  Intravenous Contrast: None.    Findings:    There is a resection at the mid aspect of the fifth metatarsal shaft. There is a lateral soft tissue wound beginning at the level of the amputation which extends distally. There is susceptibility artifact in the region of the amputation consistent with postoperative change. There is hyperintense T2 marrow signal throughout the remaining fifth metatarsal and within the adjacent fourth metatarsal head which is nonspecific and could be related to recent postoperative changes although osteomyelitis is suspected.    There is edema and mild atrophy within the plantar muscles of the foot. There is minimal spurring at the first metatarsophalangeal and hallux sesamoid articulations.    Impression:  Resection at the mid aspect of the fifth metatarsal. Lateral soft tissue wound with marrow signal abnormality throughout the fifth metatarsal and within the adjacent fourth metatarsal head which is nonspecific in the setting of recent surgery although osteomyelitis is suspected.        ASSESSMENT :     Headache    HTN (hypertension)    HLD (hyperlipidemia)    DM (diabetes mellitus)    CAD (coronary artery disease)    Glaucoma, angle-closure    [place] (hard of hearing)    No significant past surgical history    S/P CABG (coronary artery bypass graft)    History of thyroid surgery        PLAN:  HPI:  76M from home, lives with daughter w/ PMH DM on insulin, HTN, HLD, CAD, PSH R partial 5th ray amputation 2021 p/w R foot pain x1 day associated with foul smelling discharge. Pt with sharp pain on the R lateral foot. As per daughter, pt was taking tylenol which did not help his pain prompting the patient to ask his daughter to take him to the hospital. Pt is a poor historian. Daughter states that the patient did not see his podiatrist after the surgery. No fever, chest, pain, palpitations, nausea, vomiting, diarrhea (17 Sep 2021 01:11)    # SUSPECT RIGHT FOOT OSTEOMYELITIS S/P RIGHT 5TH RAY RESECTION [] AND S/P DEBRIDEMENT, GRAFT APPLICATION, BONE BX AND WOUND VAC APPLICATION   ** RECENT ADMISSION FOR RIGHT DIABETIC FOOT ULCER, CELLULITIS, OSTEOMYELITIS RIGHT 5th METATARSAL S/P RIGHT 5TH PARTIAL RAY AMPUTATION [] - BONE PATHOLOGY W/ CLEAN MARGINS    - S/P VANCOMYCIN AND ZOSYN ; NOW ON UNASYN AND LEVAQUIN GIVEN OREN  - RECENTLY HAD SIMON W/ MILD PAD  - REVIEWED WOUND CX [ ENTEROCOCCUS, AEROMONAS, KLEBSIELLA] AND BCX   - ID CONSULT, PODIATRY CONSULT    # ACUTE HYPOXIC RESPIRATORY FAILURE SUSPECT S/T PULMONARY EDEMA IN THE SETTING OF SEVERE AORTIC STENOSIS + ASPIRATION PNA - ON LEVAQUIN, STARTED LASIX, CARDIOLOGY CONSULT IN PROGRESS  - EVALUATED BY CRITICAL CARE THIS A.M. - PATIENT HAS SINCE IMPROVED  - F/U EKG, REPEAT CXR IN A.M. []      # BOWEL DISTENTION - ? ILEUS - OPTIMIZING ELECTROLYTES, SURGERY CONSULT IN PROGRESS  - F/U LACTIC ACID  - PASSED 2 BMS ON     # ASPIRATION EVENT WHEN PATIENT REMOVED NGT - ON LEVAQUIN, ID CONSULT IN PROGRESS    # CHOLELITHIASIS - TRENDING LFTS, RUQ U/S - CHOLELITHIASIS, SURGERY CONSULT IN PROGRESS  - GI CONSULT IN PROGRESS - LESS SUSPICIOUS FOR CHOLECYSTITIS    # ? DYSPEPSIA - PLACED ON PPI BID    # ELEVATED TROPONINS - NSTEMI - ? DEMAND - WILL NEED ISCHEMIC EVAL, CARDIOLOGY CONSULT IN PROGRESS  - ON ASA, STATIN, BB    # OREN ON CKD - S/T ATN AND URINARY RETENTION - S/P IVF, NOW W/ CASTAÑEDA, NEPHROLOGY CONUSLT IN PROGRESS    # MEDICAL CLEARANCE FOR SURGERY - RCRI - 2 - 10.1 % 30 DAY RISK OF DEATH, MI OR CARDIAC ARREST  - CARDIOLOGY CONSULT     # DM -  HBA1C - 11  - LANTUS, SSI + FS    # CAD S/P CABG - PLACED ON ASA, STATIN, BB  - ECHO - SEVERE AORTIC STENOSIS, SEVERE CONCENTRIC LVH, G1DD, MILD NV  - CARDIOLOGY CONSULT - DR. BASSETT    # HTN - ON METOPROLOL, NICARDIPINE    # SEVERE, ASYMPTOMATIC, STENOSIS - ONCE ACUTE ILLNESS RESOLVES, WILL LIKELY NEED ISCHEMIC WORKUP, SABIHA TO EVALUATE FURTHER. D/W PATIENT, DAUGHTER AND CARDIOLOGY TEAM [PREVIOUSLY SAW DR. BASSETT]    # VITAMIN D DEFICIENCY - ON CHOLECALCIFEROL    # HLD - STATIN    # CASE DISCUSSED AT LENGTH WITH PATIENT AND DAUGHTER, BIRDIE ROBERT AT BEDSIDE  # PATIENT AND DAUGHTER REFUSED [facility] ON PREVIOUS ADMISSION, PREVIOUSLY WISHED FOR PATIENT RETURN HOME W/ HOME PT; HOWEVER NOW, DAUGHTER WISHES FOR PATIENT TO GO TO [facility], AS PATIENT'S WIFE IS CURRENTLY ADMITTED THERE    # GI AND DVT PPX

## 2021-09-28 NOTE — CONSULT NOTE ADULT - SUBJECTIVE AND OBJECTIVE BOX
[facility]    Patient is a 76y old  Male who presents with a chief complaint of R Foot Pain (28 Sep 2021 12:12)    HPI:  76M from home, lives with daughter w/ PMH DM on insulin, HTN, HLD, CAD, 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 Pt with sharp pain on the R lateral foot. As per daughter, pt was taking tylenol which did not help his pain prompting the patient to ask his daughter to take him to the hospital. Pt is a poor historian. Daughter states that the patient did not see his podiatrist after the surgery. No fever, chest, pain, palpitations, nausea, vomiting, diarrhea (17 Sep 2021 01:11)    PMH/PSH:  PAST MEDICAL & SURGICAL HISTORY:  HTN (hypertension)    HLD (hyperlipidemia)    DM (diabetes mellitus)    CAD (coronary artery disease)    Glaucoma, angle-closure    [place] (hard of hearing)    S/P CABG (coronary artery bypass graft)    History of thyroid surgery      FH:  FAMILY HISTORY:  Family history of acute myocardial infarction (Father)    Family history of diabetes mellitus (Mother, Sibling)    Family history of hypertension (Mother, Sibling)    Family history of hyperlipidemia (Mother, Sibling)        MEDS:  MEDICATIONS  (STANDING):  aluminum hydroxide/magnesium hydroxide/simethicone Suspension 30 milliLiter(s) Oral once  ampicillin/sulbactam  IVPB 3 Gram(s) IV Intermittent every 12 hours  atorvastatin 80 milliGRAM(s) Oral at bedtime  bisacodyl Suppository 10 milliGRAM(s) Rectal once  cyanocobalamin 1000 MICROGram(s) Oral daily  ergocalciferol 11690 Unit(s) Oral <User Schedule>  ferrous    sulfate 325 milliGRAM(s) Oral daily  fluconAZOLE IVPB      fluconAZOLE IVPB 100 milliGRAM(s) IV Intermittent every 24 hours  heparin   Injectable 5000 Unit(s) SubCutaneous every 8 hours  influenza   Vaccine 0.5 milliLiter(s) IntraMuscular once  insulin lispro (ADMELOG) corrective regimen sliding scale   SubCutaneous Before meals and at bedtime  levoFLOXacin  Tablet 250 milliGRAM(s) Oral every 48 hours  metoprolol succinate  milliGRAM(s) Oral daily  NIFEdipine XL 60 milliGRAM(s) Oral daily  pantoprazole  Injectable 40 milliGRAM(s) IV Push two times a day  polyethylene glycol 3350 17 Gram(s) Oral daily  senna 2 Tablet(s) Oral at bedtime  tamsulosin 0.4 milliGRAM(s) Oral at bedtime    MEDICATIONS  (PRN):  ondansetron Injectable 4 milliGRAM(s) IV Push three times a day PRN Nausea and/or Vomiting    Allergies    No Known Allergies    Intolerances            CONSTITUTIONAL:  No weight loss, fever, chills, weakness or fatigue.  HEENT:  Eyes:  No visual loss, blurred vision, double vision or yellow sclerae. Ears, Nose, Throat:  No hearing loss, sneezing, congestion, runny nose or sore throat.  SKIN:  No rash or itching.  CARDIOVASCULAR:  No chest pain, chest pressure or chest discomfort. No palpitations or edema.  RESPIRATORY:  No shortness of breath, cough or sputum.  GASTROINTESTINAL:  SEE HPI  GENITOURINARY:  No dysuria, hematuria, urinary frequency  NEUROLOGICAL:  No headache, dizziness, syncope, paralysis, ataxia, numbness or tingling in the extremities. No change in bowel or bladder control.  MUSCULOSKELETAL:  No muscle, back pain, joint pain or stiffness.  HEMATOLOGIC:  No anemia, bleeding or bruising.  LYMPHATICS:  No enlarged nodes. No history of splenectomy.  PSYCHIATRIC:  No history of depression or anxiety.  ENDOCRINOLOGIC:  No reports of sweating, cold or heat intolerance. No polyuria or polydipsia.    GI HPI: Patient seen and examined lying in bed in [facility]. He denies abdominal pain, nausea or vomiting. He states last BM 2 days ago. He denies vomiting but admit to retching. Currently he is asking for something to drink.      ______________________________________________________________________  PHYSICAL EXAM:  T(C): 36.7 (09-28-21 @ 11:07), Max: 36.8 (09-27-21 @ 23:34)  HR: 85 (09-28-21 @ 11:07)  BP: 110/66 (09-28-21 @ 11:07)  RR: 18 (09-28-21 @ 11:07)  SpO2: 98% (09-28-21 @ 11:07)  Wt(kg): --    09-27  -  09-28  --------------------------------------------------------  IN:  Total IN: 0 mL    OUT:    Indwelling Catheter - Urethral (mL): 1850 mL    Oral Fluid: 0 mL    Stool (mL): 0 mL  Total OUT: 1850 mL    Total NET: -1850 mL          GEN: NAD, normocephalic  CVS: S1S2+  CHEST: clear to auscultation  ABD: soft , nontender, mildly distended, bowel sounds present, no rebound, no guarding  EXTR: no cyanosis, no clubbing, no edema  NEURO: Awake and alert; oriented to person, place, time and situation    SKIN:  warm;  non icteric    ______________________________________________________________________  LABS:                        9.7    6.89  )-----------( 314      ( 28 Sep 2021 07:39 )             29.7     09-28    144  |  109<H>  |  39<H>  ----------------------------<  124<H>  3.7   |  27  |  2.24<H>    Ca    8.7      28 Sep 2021 07:39  Phos  3.0     09-28  Mg     2.3     09-28    TPro  7.1  /  Alb  2.3<L>  /  TBili  0.5  /  DBili  x   /  AST  20  /  ALT  15  /  AlkPhos  101  09-28    LIVER FUNCTIONS - ( 28 Sep 2021 07:39 )  Alb: 2.3 g/dL / Pro: 7.1 g/dL / ALK PHOS: 101 U/L / ALT: 15 U/L DA / AST: 20 U/L / GGT: x

## 2021-09-28 NOTE — PROGRESS NOTE ADULT - PROBLEM SELECTOR PLAN 3
AXR with colonic distention, hyperactive bowel sounds  did not tolerate oral laxatives  will attempt dulcolax  CT A/P when patient can tolerate, cannot lie flat at this time   f/u lactate   surgery onboard as concern for bowel obstruction Podiatry and ID following  biopsy pending  s/p wound vac change 9/27  on unasyn and levaquin per ID given OREN (was previously on zosyn)  duration pending margin on biopsy as per ID recs

## 2021-09-28 NOTE — PROGRESS NOTE ADULT - ASSESSMENT
76M with large bowel distention, retching with clr fluid intake  CT findings reviewed, no acute pathology   incidental findings of distended gb with stones, currently no signs of acute cholecystitis, LFTs wnl yesterday, f/u today's labs    - Recommend GI consult  - Diet as per GI recommendations  - Continue care as per primary team  - Likely no surgical intervention warranted at this time  - Discussed with Dr. Sagastume

## 2021-09-28 NOTE — PROGRESS NOTE ADULT - ASSESSMENT
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 s/p OR debridement today. Nephrology consulted for abrupt rise in SCr.     1. OREN- likely ATN in the setting of infection and ACEi use. Lisinopril discontinued 9/22. Renal function plateaued and now recovering. Pt with good urine o/p s/p Lasix 80mg IV x1 on 9/27 for pulmonary edema/ SOB. Pt with mild rales on exam- recc Lasix 40mg IV x1. Repeat CXR. .   Kidney/ Bladder US with large distended bladder s/p ibrahim placement on 9/23; renal function did not improve post ibrahim; less likely due to obstructive uropathy. UA with 30 protein, trace blood with small LE, Check UCX to r/o UTI.   FeNa 0.72% and FeUrea 19.39%; consistent with pre-renal OREN. However renal function worsening on IVF. No peripheral eosinophilia- no signs of AIN. C3 & C4 wnl with neg ASO- no signs of PIGN. Strict I/Os. Avoid nephrotoxins/ NSAIDs/ RCA. Monitor BMP.  2. 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 Will defer secondary w/u as an outpt. Avoid nephrotoxins  3. HTN 2/2 CKD- BP acceptable. Lisinopril d/c due to OREN. c/w Nifedipine ER 60mg PO qd. Monitor BP  4. Acute osteomyelitis- s/p debridement 9/22. Pt on Unasyn/ Levaquin renally dosed. Plan as per ID      [facility] NEPHROLOGY  Bryn Johnson M.D.  AMAIRANI Ayers.O.  Zakia Rodriguez M.D.  Zonia Adams, MSN, ANP-C  [phone]    [id number] [id number]

## 2021-09-28 NOTE — PROGRESS NOTE ADULT - PROBLEM SELECTOR PLAN 8
DVT: Heparin due to CKD  GI: PPI CAD s/p CABG, severe aortic stenosis --> patient will need TAVR, SABIHA after infectious workup is complete  continue ASA, lipitor, BB, ACEi  EF 55-60%, GIDD   Cardio: Dr. Wilkins - recommends further cardiac workup when patient stable from OREN and infectious perspective given severe AS

## 2021-09-28 NOTE — PROGRESS NOTE ADULT - PROBLEM SELECTOR PLAN 1
repeat CXR this AM with pulm edema  f/u EKG, troponin, proBNP, ABG  Lasix 80 mg x 1   monitor respiratory status  ICU consulted  tight I's and O's, daily weights  ibrahim catheter for close UO monitoring repeat CXR this AM with pulm edema  f/u EKG, troponin, proBNP, ABG  Lasix 80 mg x 1   monitor respiratory status  Cardiology consulted , Franky  Nephro consulted Dr. Rodriguez  tight I's and O's, daily weights Today -2.4 L out  ibrahim catheter for close UO monitoring

## 2021-09-28 NOTE — PROGRESS NOTE ADULT - SUBJECTIVE AND OBJECTIVE BOX
S: Patient self discontinued NG tube overnight and had oxygen desaturations to the low 80's requiring non-rebreather. Attempts to replace NGT unsuccessful as patient could not tolerate     O:  Vital Signs Last 24 Hrs  T(C): 36.1 (26 Sep 2021 13:28), Max: 36.9 (25 Sep 2021 20:02)  T(F): 97 (26 Sep 2021 13:28), Max: 98.5 (25 Sep 2021 20:02)  HR: 87 (26 Sep 2021 13:28) (83 - 91)  BP: 108/54 (26 Sep 2021 13:28) (102/49 - 108/55)  BP(mean): --  RR: 19 (26 Sep 2021 13:28) (18 - 19)  SpO2: 95% (26 Sep 2021 13:28) (94% - 95%)    GENERAL: NAD, well-developed  HEAD:  Atraumatic, Normocephalic  EYES: EOMI, PERRLA, conjunctiva and sclera clear  NECK: Supple, No JVD  CHEST/LUNG: bilateral rhonchi  HEART: III/VI systolic ejection murmur   ABDOMEN: distended abdomen with hypoactive bowel sounds   EXTREMITIES: + wound VAC to RLE   PSYCH: AAOx3, agitated, non-cooperative   NEUROLOGY: non-focal  SKIN: No rashes or lesions    ampicillin/sulbactam  IVPB 3 Gram(s) IV Intermittent every 12 hours  atorvastatin 80 milliGRAM(s) Oral at bedtime  bisacodyl Suppository 10 milliGRAM(s) Rectal once  cyanocobalamin 1000 MICROGram(s) Oral daily  ergocalciferol 56980 Unit(s) Oral <User Schedule>  ferrous    sulfate 325 milliGRAM(s) Oral daily  fluconAZOLE IVPB      fluconAZOLE IVPB 100 milliGRAM(s) IV Intermittent every 24 hours  heparin   Injectable 5000 Unit(s) SubCutaneous every 8 hours  HYDROmorphone  Injectable 0.5 milliGRAM(s) IV Push daily PRN  influenza   Vaccine 0.5 milliLiter(s) IntraMuscular once  insulin lispro (ADMELOG) corrective regimen sliding scale   SubCutaneous Before meals and at bedtime  lactulose Syrup 20 Gram(s) Oral once  levoFLOXacin  Tablet 250 milliGRAM(s) Oral every 48 hours  metoprolol succinate  milliGRAM(s) Oral daily  NIFEdipine XL 60 milliGRAM(s) Oral daily  ondansetron Injectable 4 milliGRAM(s) IV Push three times a day PRN  oxyCODONE    IR 5 milliGRAM(s) Oral every 4 hours PRN  polyethylene glycol 3350 17 Gram(s) Oral daily  senna 2 Tablet(s) Oral at bedtime  tamsulosin 0.4 milliGRAM(s) Oral at bedtime                            8.9    7.45  )-----------( 248      ( 26 Sep 2021 07:23 )             26.9       09-26    141  |  109<H>  |  47<H>  ----------------------------<  144<H>  4.1   |  19<L>  |  3.83<H>    Ca    8.3<L>      26 Sep 2021 07:23    TPro  6.8  /  Alb  2.3<L>  /  TBili  0.4  /  DBili  x   /  AST  24  /  ALT  13  /  AlkPhos  95  09-26    < from: Xray Abdomen 1 View Portable, IMMEDIATE (Xray Abdomen 1 View Portable, IMMEDIATE .) (09.26.21 @ 15:28) >  IMPRESSION: Gaseous distention of the colon. If clinically warranted, further evaluation may be obtained with CT scan.          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 He tried taking Tylenol for the pain with minimal relief. Of note, the patient never followed up with podiatry after his procedure in August. MRI confirms suspected osteomyelitis - patient followed by podiatry and ID, patient undergo debridement and wound VAC placement. Patient currently on course of Unasyn and Levofloxacin, ID to follow up final surgical path. 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 Nephrology Dr. Rodriguez following. Hospital course complicated by abdominal distention and constipation for which he received lactulose and vomited. The patient had subsequent respiratory distress and it is suspected he aspirated as CXR showed possible RLL PNA. AXR shows distended loops of bowel for which NG tube was placed which the patient self d/c'd overnight 9/26 - re-attempts to replace NG tube were unsuccessful as patient was non-cooperative. Surgery following. CT abdomen deferred for now as patient is unstable and will not tolerate lying flat. ICU consulted given patient's deterioration in clinical status.       --- acute pulmonary edema, troponin elevated-- likely demand ischemia, BNP > 20,000 : transfer to telemetry    INTERVAL HPI/OVERNIGHT EVENTS:   Reports nausea, otherwise no acute complaints or overnight events    REVIEW OF SYSTEMS:  CONSTITUTIONAL: No fever, weight loss, or fatigue  RESPIRATORY: No cough, wheezing, chills or hemoptysis; No shortness of breath  CARDIOVASCULAR: No chest pain, palpitations, dizziness, or leg swelling  GASTROINTESTINAL: No abdominal pain. No nausea, vomiting, or hematemesis; No diarrhea or constipation. No melena or hematochezia.  GENITOURINARY: No dysuria or hematuria, urinary frequency  NEUROLOGICAL: No headaches, memory loss, loss of strength, numbness, or tremors  SKIN: No itching, burning, rashes, or lesions     MEDICATIONS  (STANDING):  aluminum hydroxide/magnesium hydroxide/simethicone Suspension 30 milliLiter(s) Oral once  ampicillin/sulbactam  IVPB 3 Gram(s) IV Intermittent every 12 hours  atorvastatin 80 milliGRAM(s) Oral at bedtime  bisacodyl Suppository 10 milliGRAM(s) Rectal once  cyanocobalamin 1000 MICROGram(s) Oral daily  ergocalciferol 56580 Unit(s) Oral <User Schedule>  ferrous    sulfate 325 milliGRAM(s) Oral daily  fluconAZOLE IVPB      fluconAZOLE IVPB 100 milliGRAM(s) IV Intermittent every 24 hours  heparin   Injectable 5000 Unit(s) SubCutaneous every 8 hours  influenza   Vaccine 0.5 milliLiter(s) IntraMuscular once  insulin lispro (ADMELOG) corrective regimen sliding scale   SubCutaneous Before meals and at bedtime  levoFLOXacin  Tablet 250 milliGRAM(s) Oral every 48 hours  metoprolol succinate  milliGRAM(s) Oral daily  NIFEdipine XL 60 milliGRAM(s) Oral daily  pantoprazole  Injectable 40 milliGRAM(s) IV Push two times a day  polyethylene glycol 3350 17 Gram(s) Oral daily  senna 2 Tablet(s) Oral at bedtime  tamsulosin 0.4 milliGRAM(s) Oral at bedtime    MEDICATIONS  (PRN):  ondansetron Injectable 4 milliGRAM(s) IV Push three times a day PRN Nausea and/or Vomiting      Vital Signs Last 24 Hrs  T(C): 36.7 (28 Sep 2021 11:07), Max: 36.8 (27 Sep 2021 23:34)  T(F): 98.1 (28 Sep 2021 11:07), Max: 98.2 (27 Sep 2021 23:34)  HR: 85 (28 Sep 2021 11:07) (65 - 103)  BP: 110/66 (28 Sep 2021 11:07) (110/66 - 169/79)  BP(mean): --  RR: 18 (28 Sep 2021 11:07) (18 - 19)  SpO2: 98% (28 Sep 2021 11:07) (91% - 98%)    PHYSICAL EXAMINATION:  GENERAL: NAD, well-developed  HEAD:  Atraumatic, Normocephalic  EYES: EOMI, PERRLA, conjunctiva and sclera clear  NECK: Supple, No JVD  CHEST/LUNG: bilateral rhonchi  HEART: III/VI systolic ejection murmur   ABDOMEN: distended abdomen, +BS  EXTREMITIES: + wound VAC to RLE   PSYCH: AAOx3, agitated, non-cooperative   NEUROLOGY: non-focal  SKIN: No rashes or lesions                        9.7    6.89  )-----------( 314      ( 28 Sep 2021 07:39 )             29.7     09-28    144  |  109<H>  |  39<H>  ----------------------------<  124<H>  3.7   |  27  |  2.24<H>    Ca    8.7      28 Sep 2021 07:39  Phos  3.0     09-28  Mg     2.3     09-28    TPro  7.1  /  Alb  2.3<L>  /  TBili  0.5  /  DBili  x   /  AST  20  /  ALT  15  /  AlkPhos  101  09-28    LIVER FUNCTIONS - ( 28 Sep 2021 07:39 )  Alb: 2.3 g/dL / Pro: 7.1 g/dL / ALK PHOS: 101 U/L / ALT: 15 U/L DA / AST: 20 U/L / GGT: x           CARDIAC MARKERS ( 28 Sep 2021 07:39 )  1.510 ng/mL / x     / 56 U/L / x     / 1.0 ng/mL  CARDIAC MARKERS ( 27 Sep 2021 19:41 )  1.220 ng/mL / x     / x     / x     / x      CARDIAC MARKERS ( 27 Sep 2021 11:04 )  1.460 ng/mL / x     / x     / x     / x              I&O's Summary    27 Sep 2021 07:01  -  28 Sep 2021 07:00  --------------------------------------------------------  IN: 0 mL / OUT: 1850 mL / NET: -1850 mL    28 Sep 2021 07:01  -  28 Sep 2021 15:30  --------------------------------------------------------  IN: 0 mL / OUT: 300 mL / NET: -300 mL            CAPILLARY BLOOD GLUCOSE      RADIOLOGY & ADDITIONAL TESTS:                     PGY-1 Progress Note discussed with attending    CHIEF COMPLAINT & BRIEF HOSPITAL COURSE:  76 year old male with past medical history of poorly controlled IDDM, HTN, HLD, stage III CKD, CAD s/p CABG and recent right partial 5th ray amputation (8/19/21) who presented to ED with c/o right foot pain associated with discharge and foul odor. He tried taking Tylenol for the pain with minimal relief. Of note, the patient never followed up with podiatry after his procedure in August. MRI confirms suspected osteomyelitis - patient followed by podiatry and ID, patient undergo debridement and wound VAC placement. Patient currently on course of Unasyn and Levofloxacin, ID to follow up final surgical path. 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 Nephrology Dr. Rodriguez following. Hospital course complicated by abdominal distention and constipation for which he received lactulose and vomited. The patient had subsequent respiratory distress and it is suspected he aspirated as CXR showed possible RLL PNA. AXR shows distended loops of bowel for which NG tube was placed which the patient self d/c'd overnight 9/26 - re-attempts to replace NG tube were unsuccessful as patient was non-cooperative. Surgery following. CT abdomen deferred for now as patient is unstable and will not tolerate lying flat. ICU consulted given patient's deterioration in clinical status.       --- acute pulmonary edema, troponin elevated-- likely demand ischemia, BNP > 20,000 : transfer to telemetry    INTERVAL HPI/OVERNIGHT EVENTS:   Reports nausea, otherwise no acute complaints or overnight events    REVIEW OF SYSTEMS:  CONSTITUTIONAL: No fever, weight loss, or fatigue  RESPIRATORY: No cough, wheezing, chills or hemoptysis; No shortness of breath  CARDIOVASCULAR: No chest pain, palpitations, dizziness, or leg swelling  GASTROINTESTINAL: No abdominal pain. No nausea, vomiting, or hematemesis; No diarrhea or constipation. No melena or hematochezia.  GENITOURINARY: No dysuria or hematuria, urinary frequency  NEUROLOGICAL: No headaches, memory loss, loss of strength, numbness, or tremors  SKIN: No itching, burning, rashes, or lesions     MEDICATIONS  (STANDING):  aluminum hydroxide/magnesium hydroxide/simethicone Suspension 30 milliLiter(s) Oral once  ampicillin/sulbactam  IVPB 3 Gram(s) IV Intermittent every 12 hours  atorvastatin 80 milliGRAM(s) Oral at bedtime  bisacodyl Suppository 10 milliGRAM(s) Rectal once  cyanocobalamin 1000 MICROGram(s) Oral daily  ergocalciferol 43630 Unit(s) Oral <User Schedule>  ferrous    sulfate 325 milliGRAM(s) Oral daily  fluconAZOLE IVPB      fluconAZOLE IVPB 100 milliGRAM(s) IV Intermittent every 24 hours  heparin   Injectable 5000 Unit(s) SubCutaneous every 8 hours  influenza   Vaccine 0.5 milliLiter(s) IntraMuscular once  insulin lispro (ADMELOG) corrective regimen sliding scale   SubCutaneous Before meals and at bedtime  levoFLOXacin  Tablet 250 milliGRAM(s) Oral every 48 hours  metoprolol succinate  milliGRAM(s) Oral daily  NIFEdipine XL 60 milliGRAM(s) Oral daily  pantoprazole  Injectable 40 milliGRAM(s) IV Push two times a day  polyethylene glycol 3350 17 Gram(s) Oral daily  senna 2 Tablet(s) Oral at bedtime  tamsulosin 0.4 milliGRAM(s) Oral at bedtime    MEDICATIONS  (PRN):  ondansetron Injectable 4 milliGRAM(s) IV Push three times a day PRN Nausea and/or Vomiting      Vital Signs Last 24 Hrs  T(C): 36.7 (28 Sep 2021 11:07), Max: 36.8 (27 Sep 2021 23:34)  T(F): 98.1 (28 Sep 2021 11:07), Max: 98.2 (27 Sep 2021 23:34)  HR: 85 (28 Sep 2021 11:07) (65 - 103)  BP: 110/66 (28 Sep 2021 11:07) (110/66 - 169/79)  BP(mean): --  RR: 18 (28 Sep 2021 11:07) (18 - 19)  SpO2: 98% (28 Sep 2021 11:07) (91% - 98%)    PHYSICAL EXAMINATION:  GENERAL: NAD, well-developed  HEAD:  Atraumatic, Normocephalic  EYES: EOMI, PERRLA, conjunctiva and sclera clear  NECK: Supple, No JVD  CHEST/LUNG: bilateral rhonchi  HEART: III/VI systolic ejection murmur   ABDOMEN: distended abdomen, +BS  EXTREMITIES: + wound VAC to RLE   PSYCH: AAOx3, agitated, non-cooperative   NEUROLOGY: non-focal  SKIN: No rashes or lesions                        9.7    6.89  )-----------( 314      ( 28 Sep 2021 07:39 )             29.7     09-28    144  |  109<H>  |  39<H>  ----------------------------<  124<H>  3.7   |  27  |  2.24<H>    Ca    8.7      28 Sep 2021 07:39  Phos  3.0     09-28  Mg     2.3     09-28    TPro  7.1  /  Alb  2.3<L>  /  TBili  0.5  /  DBili  x   /  AST  20  /  ALT  15  /  AlkPhos  101  09-28    LIVER FUNCTIONS - ( 28 Sep 2021 07:39 )  Alb: 2.3 g/dL / Pro: 7.1 g/dL / ALK PHOS: 101 U/L / ALT: 15 U/L DA / AST: 20 U/L / GGT: x           CARDIAC MARKERS ( 28 Sep 2021 07:39 )  1.510 ng/mL / x     / 56 U/L / x     / 1.0 ng/mL  CARDIAC MARKERS ( 27 Sep 2021 19:41 )  1.220 ng/mL / x     / x     / x     / x      CARDIAC MARKERS ( 27 Sep 2021 11:04 )  1.460 ng/mL / x     / x     / x     / x              I&O's Summary    27 Sep 2021 07:01  -  28 Sep 2021 07:00  --------------------------------------------------------  IN: 0 mL / OUT: 1850 mL / NET: -1850 mL    28 Sep 2021 07:01  -  28 Sep 2021 15:30  --------------------------------------------------------  IN: 0 mL / OUT: 300 mL / NET: -300 mL            CAPILLARY BLOOD GLUCOSE      RADIOLOGY & ADDITIONAL TESTS:

## 2021-09-28 NOTE — CONSULT NOTE ADULT - PROBLEM SELECTOR RECOMMENDATION 9
AXR showing gaseous distention of the colon  CT and US showing distended GB with sludge and small stones; no evidence of acute cholecystitis  LFT's WNL  Sx note appreciated - no intervention warranted at this time AXR showing gaseous distention of the colon  CT and US showing distended GB with sludge and small stones; no evidence of acute cholecystitis  LFT's WNL  Sx note appreciated - no intervention warranted at this time  patient with +BM this afternoon  no GI intervention at this time  continue current management

## 2021-09-28 NOTE — PROGRESS NOTE ADULT - ASSESSMENT
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  As per daughter, patient was taking tylenol which did not help his pain prompting the patient to ask his daughter to take him to the hospital. ON admission, he has no fever or Leukocytosis. The Xray of Right foot shows no Osteomyelitis but MRI of Right foot shows Lateral soft tissue wound with marrow signal abnormality throughout the fifth metatarsal which is consistent of Osteomyelitis. He has seen by Podiatry and wound culture has sent, Zosyn and Vancomycin has started. The ID consult requested to assist with further evaluation and antibiotic management.     # Right Fifth metatarsal DFU with drainage and Osteomyelitis- wound cx grew Enterococcus, Aeromonas and Klebsiella - Zosyn is the ideal to cover All organisms but kidney function is worsening, hence change to Unasyn and Levaquin until kidney function is improved - The pathology shows Bone with acute osteomyelitis and necrotic periosteal tissue.   # OREN- s/p urinary retention -s/p ibrahim catheter - s/p discontinue ACEI  # RLL pneumonia- most likely Aspiration, S/p Vomiting - On Unasyn  # Large bowel distension  # Candiduria- 9/22/21    would recommend:    1. Supplemental oxygenation And bronchodilator as needed  2. Monitor  kidney function, is improve   3. Continue Adjusted doses Levaquin and  Unasyn until kidney function improved   4. Wound care as per Podiatry  5. Taper off pain medications since become confused  6. Aspiration precaution    d/w Nursing staff     Attending Attestation:    Spent more than 35 minutes on total encounter, more than 50 % of the visit was spent counseling and/or coordinating care by the Attending physician.   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  As per daughter, patient was taking tylenol which did not help his pain prompting the patient to ask his daughter to take him to the hospital. ON admission, he has no fever or Leukocytosis. The Xray of Right foot shows no Osteomyelitis but MRI of Right foot shows Lateral soft tissue wound with marrow signal abnormality throughout the fifth metatarsal which is consistent of Osteomyelitis. He has seen by Podiatry and wound culture has sent, Zosyn and Vancomycin has started. The ID consult requested to assist with further evaluation and antibiotic management.     # Right Fifth metatarsal DFU with drainage and Osteomyelitis- wound cx grew Enterococcus, Aeromonas and Klebsiella - Zosyn is the ideal to cover All organisms but kidney function is worsening, hence change to Unasyn and Levaquin until kidney function is improved - The pathology shows Bone with acute osteomyelitis and necrotic periosteal tissue.   # OREN- s/p urinary retention -s/p ibrahim catheter - s/p discontinue ACEI  # RLL pneumonia- most likely Aspiration, S/p Vomiting - On Unasyn  # Large bowel distension  # Candiduria- 9/22/21    would recommend:    1. Supplemental oxygenation And bronchodilator as needed  2. Monitor  kidney function, is improving  3. Continue Adjusted doses Levaquin and  Unasyn until kidney function improved   4. Wound care as per Podiatry  5. Taper off pain medications since become confused  6. Aspiration precaution    d/w Nursing staff     Attending Attestation:    Spent more than 35 minutes on total encounter, more than 50 % of the visit was spent counseling and/or coordinating care by the Attending physician.

## 2021-09-28 NOTE — PROGRESS NOTE ADULT - SUBJECTIVE AND OBJECTIVE BOX
Patient is seen and examined at the bed side, is afebrile.  The kidney function is improving slowly. The events noted regarding unable to place NGT and patient is desaturating.       REVIEW OF SYSTEMS: All other review systems are negative      ALLERGIES: No Known Allergies      Vital Signs Last 24 Hrs  T(C): 36.7 (28 Sep 2021 15:15), Max: 36.8 (27 Sep 2021 23:34)  T(F): 98 (28 Sep 2021 15:15), Max: 98.2 (27 Sep 2021 23:34)  HR: 85 (28 Sep 2021 15:15) (65 - 103)  BP: 120/64 (28 Sep 2021 15:15) (110/66 - 169/79)  BP(mean): --  RR: 18 (28 Sep 2021 15:15) (18 - 19)  SpO2: 94% (28 Sep 2021 15:15) (91% - 98%)      PHYSICAL EXAM:  GENERAL: Not in distress, on NRB  CHEST/LUNG:  Not using accessory muscles  HEART: s1 and s2 present  ABDOMEN:  Mild distended   EXTREMITIES: Right foot bandage in placed   CNS: Awake and Alert      LABS:                                   9.7    6.89  )-----------( 314      ( 28 Sep 2021 07:39 )             29.7                           9.6    9.09  )-----------( 302      ( 27 Sep 2021 06:17 )             29.2       09-28    144  |  109<H>  |  39<H>  ----------------------------<  124<H>  3.7   |  27  |  2.24<H>    Ca    8.7      28 Sep 2021 07:39  Phos  3.0     09-28  Mg     2.3     09-28    TPro  7.1  /  Alb  2.3<L>  /  TBili  0.5  /  DBili  x   /  AST  20  /  ALT  15  /  AlkPhos  101  09-28 09-27    142  |  109<H>  |  49<H>  ----------------------------<  158<H>  3.9   |  21<L>  |  3.19<H>    Ca    8.5      27 Sep 2021 06:17  Mg     2.4     09-27    TPro  7.1  /  Alb  2.4<L>  /  TBili  0.4  /  DBili  x   /  AST  20  /  ALT  14  /  AlkPhos  103  09-27 09-25    139  |  107  |  41<H>  ----------------------------<  134<H>  4.1   |  21<L>  |  4.05<H>    Ca    8.6      25 Sep 2021 06:40    TPro  6.6  /  Alb  2.3<L>  /  TBili  0.5  /  DBili  x   /  AST  25  /  ALT  15  /  AlkPhos  102  09-25        CAPILLARY BLOOD GLUCOSE  POCT Blood Glucose.: 134 mg/dL (17 Sep 2021 17:11)  POCT Blood Glucose.: 128 mg/dL (17 Sep 2021 11:06)  POCT Blood Glucose.: 157 mg/dL (17 Sep 2021 04:10)      MEDICATIONS  (STANDING):    aluminum hydroxide/magnesium hydroxide/simethicone Suspension 30 milliLiter(s) Oral once  ampicillin/sulbactam  IVPB 3 Gram(s) IV Intermittent every 12 hours  atorvastatin 80 milliGRAM(s) Oral at bedtime  bisacodyl Suppository 10 milliGRAM(s) Rectal once  cyanocobalamin 1000 MICROGram(s) Oral daily  ergocalciferol 26836 Unit(s) Oral <User Schedule>  ferrous    sulfate 325 milliGRAM(s) Oral daily  fluconAZOLE IVPB      fluconAZOLE IVPB 100 milliGRAM(s) IV Intermittent every 24 hours  heparin   Injectable 5000 Unit(s) SubCutaneous every 8 hours  influenza   Vaccine 0.5 milliLiter(s) IntraMuscular once  insulin lispro (ADMELOG) corrective regimen sliding scale   SubCutaneous Before meals and at bedtime  levoFLOXacin  Tablet 250 milliGRAM(s) Oral every 48 hours  metoprolol succinate  milliGRAM(s) Oral daily  NIFEdipine XL 60 milliGRAM(s) Oral daily  pantoprazole  Injectable 40 milliGRAM(s) IV Push two times a day  polyethylene glycol 3350 17 Gram(s) Oral daily  senna 2 Tablet(s) Oral at bedtime  tamsulosin 0.4 milliGRAM(s) Oral at bedtime      RADIOLOGY & ADDITIONAL TESTS:    Collected Date/Time: 9/22/2021 08:42 EDT   Received Date/Time: 9/23/2021 09:29 EDT   Surgical Pathology Report - Auth (Verified)   Specimen(s) Submitted   1 Right 5th Toe Wound Debridement r/o Osteomyelitis   Final Diagnosis   Right fifth toe; wound debridement:   - Bone with acute osteomyelitis and necrotic periosteal tissue.     9/28/21: US Abdomen Upper Quadrant Right (09.28.21 @ 12:13) Cholelithiasis without evidence of acute cholecystitis. Note is made of right pleural effusion    9/27/21: CT Abdomen and Pelvis w/ Oral Cont (09.27.21 @ 15:53) >No acute intra-abdominal pathology.    Small bilateral pleural effusions with compressive atelectasis of both lower lobes.    9/26/21: Xray Chest 1 View-PORTABLE IMMEDIATE (Xray Chest 1 View-PORTABLE IMMEDIATE .) (09.26.21 @ 15:28) : Small bilateral pleural effusions and mild pulmonary edema. A right lower lobe pneumonia is not excluded.      9/26/21: Xray Abdomen 1 View Portable, IMMEDIATE (Xray Abdomen 1 View Portable, IMMEDIATE .) (09.26.21 @ 15:28) : There is a nasogastric tube with its tip in the stomach. There is gaseous distention of the colon. No pathologic calcifications are seen. The osseous structures are intact with degenerative change is present in the spine.      9/17/21 : MR Foot No Cont, Right (09.17.21 @ 13:04) : Resection at the mid aspect of the fifth metatarsal. Lateral soft tissue wound with marrow signal abnormality throughout the fifth metatarsal and within the adjacent fourth metatarsal head which is nonspecific in the setting of recent surgery although osteomyelitis is suspected.    9/16/21 : Xray Foot AP + Lateral + Oblique, Right (09.16.21 @ 15:03) : No acute finding. No plain film evidence of osteomyelitis.        MICROBIOLOGY DATA:    Urine Microscopic-Add On (NC) (09.22.21 @ 16:14)   Red Blood Cell - Urine: 0-2 /HPF   White Blood Cell - Urine: 3-5 /HPF   Bacteria: Moderate /HPF   Comment - Urine: yeast cells present   Epithelial Cells: Moderate /HPF     Culture - Tissue with Gram Stain (09.22.21 @ 13:54)   Gram Stain: No polymorphonuclear cells seen per low power field   No organisms seen per oil power field   Specimen Source: .Tissue Right 5th toe r/o osteomyeltis   Culture Results: No growth     COVID-19 David Domain Antibody (09.18.21 @ 12:55)   COVID-19 David Domain Antibody Result: >250.00:    Culture - Blood (09.17.21 @ 10:28)   Specimen Source: .Blood Blood-Peripheral   Culture Results: No growth to date.     Culture - Blood (09.17.21 @ 10:28)   Specimen Source: .Blood Blood-Venous   Culture Results: No growth to date.     Culture - Surgical Swab (09.16.21 @ 21:42)   - Amikacin: S <=16   - Amikacin: S <=16   - Amoxicillin/Clavulanic Acid: S <=8/4   - Ampicillin: R >16 These ampicillin results predict results for amoxicillin   - Ampicillin: S <=2 Predicts results to ampicillin/sulbactam, amoxacillin-clavulanate and piperacillin-tazobactam.   - Ampicillin/Sulbactam: S 8/4 Enterobacter, Citrobacter, and Serratia may develop resistance during prolonged therapy (3-4 days)   - Ampicillin/Sulbactam: R >16/8   - Aztreonam: S <=4   - Aztreonam: S <=4   - Cefazolin: R 16 Enterobacter, Citrobacter, and Serratia may develop resistance during prolonged therapy (3-4 days)   - Cefazolin: R >16   - Cefepime: S <=2   - Cefepime: S <=2   - Cefoxitin: S <=8   - Cefoxitin: S <=8   - Ceftazidime: S <=1   - Ceftriaxone: S <=1   - Ceftriaxone: S <=1 Enterobacter, Citrobacter, and Serratia may develop resistance during prolonged therapy   - Ciprofloxacin: S <=0.25   - Ciprofloxacin: S <=0.25   - Ertapenem: S <=0.5   - Gentamicin: S <=2   - Gentamicin: S <=2   - Imipenem: S <=1   - Levofloxacin: S <=0.5   - Levofloxacin: S <=0.5   - Meropenem: S <=1   - Meropenem: S <=1   - Piperacillin/Tazobactam: S <=8   - Piperacillin/Tazobactam: S <=8   - Tetra/Doxy: S 4   - Tobramycin: S <=2   - Trimethoprim/Sulfamethoxazole: S <=0.5/9.5   - Trimethoprim/Sulfamethoxazole: S <=0.5/9.5   - Vancomycin: S 2   Specimen Source: .Surgical Swab right foot wound   Culture Results:   Culture yields >4 types of aerobic and/or anaerobic bacteria   Call client services within 7 days if further workup is clinically   indicated. Culture includes   Rare Aeromonas hydrophila/caviae   Few Klebsiella oxytoca/Raoutella ornithinolytica   Few Enterococcus faecalis   Few Streptococcus agalactiae (Group B) isolated   Group B streptococci are susceptible to ampicillin,   penicillin and cefazolin, but may be resistant to   erythromycin and clindamycin.   Recommendations for intrapartum prophylaxis for Group B   streptococci are penicillin or ampicillin.   Organism Identification: Aeromonas hydrophila/caviae   Klebsiella oxytoca /Raoutella ornithinolytica   Enterococcus faecalis   Organism: Aeromonas hydrophila/caviae   Organism: Klebsiella oxytoca /Raoutella ornithinolytica   Organism: Enterococcus faecalis   COVID-19 PCR . (09.16.21 @ 22:24)   COVID-19 PCR: NotDetec:             Patient is seen and examined at the bed side, is afebrile.  The kidney function is improving slowly. The events noted regarding unable to place NGT and patient is desaturating.       REVIEW OF SYSTEMS: All other review systems are negative      ALLERGIES: No Known Allergies      Vital Signs Last 24 Hrs  T(C): 36.7 (28 Sep 2021 15:15), Max: 36.8 (27 Sep 2021 23:34)  T(F): 98 (28 Sep 2021 15:15), Max: 98.2 (27 Sep 2021 23:34)  HR: 85 (28 Sep 2021 15:15) (65 - 103)  BP: 120/64 (28 Sep 2021 15:15) (110/66 - 169/79)  BP(mean): --  RR: 18 (28 Sep 2021 15:15) (18 - 19)  SpO2: 94% (28 Sep 2021 15:15) (91% - 98%)      PHYSICAL EXAM:  GENERAL: Not in distress, on oxygen via NC  CHEST/LUNG:  Not using accessory muscles  HEART: s1 and s2 present  ABDOMEN:  Mild distended   EXTREMITIES: Right foot bandage in placed   CNS: Awake, Alert  and confused       LABS:                                   9.7    6.89  )-----------( 314      ( 28 Sep 2021 07:39 )             29.7                           9.6    9.09  )-----------( 302      ( 27 Sep 2021 06:17 )             29.2       09-28    144  |  109<H>  |  39<H>  ----------------------------<  124<H>  3.7   |  27  |  2.24<H>    Ca    8.7      28 Sep 2021 07:39  Phos  3.0     09-28  Mg     2.3     09-28    TPro  7.1  /  Alb  2.3<L>  /  TBili  0.5  /  DBili  x   /  AST  20  /  ALT  15  /  AlkPhos  101  09-28 09-27    142  |  109<H>  |  49<H>  ----------------------------<  158<H>  3.9   |  21<L>  |  3.19<H>    Ca    8.5      27 Sep 2021 06:17  Mg     2.4     09-27    TPro  7.1  /  Alb  2.4<L>  /  TBili  0.4  /  DBili  x   /  AST  20  /  ALT  14  /  AlkPhos  103  09-27 09-25    139  |  107  |  41<H>  ----------------------------<  134<H>  4.1   |  21<L>  |  4.05<H>    Ca    8.6      25 Sep 2021 06:40    TPro  6.6  /  Alb  2.3<L>  /  TBili  0.5  /  DBili  x   /  AST  25  /  ALT  15  /  AlkPhos  102  09-25        CAPILLARY BLOOD GLUCOSE  POCT Blood Glucose.: 134 mg/dL (17 Sep 2021 17:11)  POCT Blood Glucose.: 128 mg/dL (17 Sep 2021 11:06)  POCT Blood Glucose.: 157 mg/dL (17 Sep 2021 04:10)      MEDICATIONS  (STANDING):    aluminum hydroxide/magnesium hydroxide/simethicone Suspension 30 milliLiter(s) Oral once  ampicillin/sulbactam  IVPB 3 Gram(s) IV Intermittent every 12 hours  atorvastatin 80 milliGRAM(s) Oral at bedtime  bisacodyl Suppository 10 milliGRAM(s) Rectal once  cyanocobalamin 1000 MICROGram(s) Oral daily  ergocalciferol 92266 Unit(s) Oral <User Schedule>  ferrous    sulfate 325 milliGRAM(s) Oral daily  fluconAZOLE IVPB      fluconAZOLE IVPB 100 milliGRAM(s) IV Intermittent every 24 hours  heparin   Injectable 5000 Unit(s) SubCutaneous every 8 hours  influenza   Vaccine 0.5 milliLiter(s) IntraMuscular once  insulin lispro (ADMELOG) corrective regimen sliding scale   SubCutaneous Before meals and at bedtime  levoFLOXacin  Tablet 250 milliGRAM(s) Oral every 48 hours  metoprolol succinate  milliGRAM(s) Oral daily  NIFEdipine XL 60 milliGRAM(s) Oral daily  pantoprazole  Injectable 40 milliGRAM(s) IV Push two times a day  polyethylene glycol 3350 17 Gram(s) Oral daily  senna 2 Tablet(s) Oral at bedtime  tamsulosin 0.4 milliGRAM(s) Oral at bedtime      RADIOLOGY & ADDITIONAL TESTS:    Collected Date/Time: 9/22/2021 08:42 EDT   Received Date/Time: 9/23/2021 09:29 EDT   Surgical Pathology Report - Auth (Verified)   Specimen(s) Submitted   1 Right 5th Toe Wound Debridement r/o Osteomyelitis   Final Diagnosis   Right fifth toe; wound debridement:   - Bone with acute osteomyelitis and necrotic periosteal tissue.     9/28/21: US Abdomen Upper Quadrant Right (09.28.21 @ 12:13) Cholelithiasis without evidence of acute cholecystitis. Note is made of right pleural effusion    9/27/21: CT Abdomen and Pelvis w/ Oral Cont (09.27.21 @ 15:53) >No acute intra-abdominal pathology.    Small bilateral pleural effusions with compressive atelectasis of both lower lobes.    9/26/21: Xray Chest 1 View-PORTABLE IMMEDIATE (Xray Chest 1 View-PORTABLE IMMEDIATE .) (09.26.21 @ 15:28) : Small bilateral pleural effusions and mild pulmonary edema. A right lower lobe pneumonia is not excluded.      9/26/21: Xray Abdomen 1 View Portable, IMMEDIATE (Xray Abdomen 1 View Portable, IMMEDIATE .) (09.26.21 @ 15:28) : There is a nasogastric tube with its tip in the stomach. There is gaseous distention of the colon. No pathologic calcifications are seen. The osseous structures are intact with degenerative change is present in the spine.      9/17/21 : MR Foot No Cont, Right (09.17.21 @ 13:04) : Resection at the mid aspect of the fifth metatarsal. Lateral soft tissue wound with marrow signal abnormality throughout the fifth metatarsal and within the adjacent fourth metatarsal head which is nonspecific in the setting of recent surgery although osteomyelitis is suspected.    9/16/21 : Xray Foot AP + Lateral + Oblique, Right (09.16.21 @ 15:03) : No acute finding. No plain film evidence of osteomyelitis.        MICROBIOLOGY DATA:    Urine Microscopic-Add On (NC) (09.22.21 @ 16:14)   Red Blood Cell - Urine: 0-2 /HPF   White Blood Cell - Urine: 3-5 /HPF   Bacteria: Moderate /HPF   Comment - Urine: yeast cells present   Epithelial Cells: Moderate /HPF     Culture - Tissue with Gram Stain (09.22.21 @ 13:54)   Gram Stain: No polymorphonuclear cells seen per low power field   No organisms seen per oil power field   Specimen Source: .Tissue Right 5th toe r/o osteomyeltis   Culture Results: No growth     COVID-19 David Domain Antibody (09.18.21 @ 12:55)   COVID-19 David Domain Antibody Result: >250.00:    Culture - Blood (09.17.21 @ 10:28)   Specimen Source: .Blood Blood-Peripheral   Culture Results: No growth to date.     Culture - Blood (09.17.21 @ 10:28)   Specimen Source: .Blood Blood-Venous   Culture Results: No growth to date.     Culture - Surgical Swab (09.16.21 @ 21:42)   - Amikacin: S <=16   - Amikacin: S <=16   - Amoxicillin/Clavulanic Acid: S <=8/4   - Ampicillin: R >16 These ampicillin results predict results for amoxicillin   - Ampicillin: S <=2 Predicts results to ampicillin/sulbactam, amoxacillin-clavulanate and piperacillin-tazobactam.   - Ampicillin/Sulbactam: S 8/4 Enterobacter, Citrobacter, and Serratia may develop resistance during prolonged therapy (3-4 days)   - Ampicillin/Sulbactam: R >16/8   - Aztreonam: S <=4   - Aztreonam: S <=4   - Cefazolin: R 16 Enterobacter, Citrobacter, and Serratia may develop resistance during prolonged therapy (3-4 days)   - Cefazolin: R >16   - Cefepime: S <=2   - Cefepime: S <=2   - Cefoxitin: S <=8   - Cefoxitin: S <=8   - Ceftazidime: S <=1   - Ceftriaxone: S <=1   - Ceftriaxone: S <=1 Enterobacter, Citrobacter, and Serratia may develop resistance during prolonged therapy   - Ciprofloxacin: S <=0.25   - Ciprofloxacin: S <=0.25   - Ertapenem: S <=0.5   - Gentamicin: S <=2   - Gentamicin: S <=2   - Imipenem: S <=1   - Levofloxacin: S <=0.5   - Levofloxacin: S <=0.5   - Meropenem: S <=1   - Meropenem: S <=1   - Piperacillin/Tazobactam: S <=8   - Piperacillin/Tazobactam: S <=8   - Tetra/Doxy: S 4   - Tobramycin: S <=2   - Trimethoprim/Sulfamethoxazole: S <=0.5/9.5   - Trimethoprim/Sulfamethoxazole: S <=0.5/9.5   - Vancomycin: S 2   Specimen Source: .Surgical Swab right foot wound   Culture Results:   Culture yields >4 types of aerobic and/or anaerobic bacteria   Call client services within 7 days if further workup is clinically   indicated. Culture includes   Rare Aeromonas hydrophila/caviae   Few Klebsiella oxytoca/Raoutella ornithinolytica   Few Enterococcus faecalis   Few Streptococcus agalactiae (Group B) isolated   Group B streptococci are susceptible to ampicillin,   penicillin and cefazolin, but may be resistant to   erythromycin and clindamycin.   Recommendations for intrapartum prophylaxis for Group B   streptococci are penicillin or ampicillin.   Organism Identification: Aeromonas hydrophila/caviae   Klebsiella oxytoca /Raoutella ornithinolytica   Enterococcus faecalis   Organism: Aeromonas hydrophila/caviae   Organism: Klebsiella oxytoca /Raoutella ornithinolytica   Organism: Enterococcus faecalis   COVID-19 PCR . (09.16.21 @ 22:24)   COVID-19 PCR: NotDetec:

## 2021-09-28 NOTE — CONSULT NOTE ADULT - ASSESSMENT
76 year old male with past medical history of poorly controlled IDDM, HTN, HLD, stage III CKD, CAD s/p CABG, Moderate-Severe AS and recent right partial 5th ray amputation (8/19/21) who presented to ED with c/o right foot pain associated with discharge and foul odor. MRI confirmed suspected osteomyelitis. Hospital course complicated by abdominal distention and constipation for which  he received lactulose and vomited. He then had subsequent respiratory distress and it is suspected he aspirated as CXR showed possible RLL PNA. AXR shows distended loops of bowel for which NG tube was placed and the patient self d/c'd on 9/26 - re-attempts to replace NG tube were unsuccessful as patient was non-cooperative. GI consulted for CT A/P incidental finding of distended GB with stones.

## 2021-09-28 NOTE — PROGRESS NOTE ADULT - SUBJECTIVE AND OBJECTIVE BOX
INTERVAL HPI/OVERNIGHT EVENTS:  No acute events overnight. Pt with retching after small sip of water.     MEDICATIONS  (STANDING):  ampicillin/sulbactam  IVPB 3 Gram(s) IV Intermittent every 12 hours  atorvastatin 80 milliGRAM(s) Oral at bedtime  bisacodyl Suppository 10 milliGRAM(s) Rectal once  cyanocobalamin 1000 MICROGram(s) Oral daily  ergocalciferol 41444 Unit(s) Oral <User Schedule>  ferrous    sulfate 325 milliGRAM(s) Oral daily  fluconAZOLE IVPB      fluconAZOLE IVPB 100 milliGRAM(s) IV Intermittent every 24 hours  heparin   Injectable 5000 Unit(s) SubCutaneous every 8 hours  influenza   Vaccine 0.5 milliLiter(s) IntraMuscular once  insulin lispro (ADMELOG) corrective regimen sliding scale   SubCutaneous Before meals and at bedtime  levoFLOXacin  Tablet 250 milliGRAM(s) Oral every 48 hours  metoprolol succinate  milliGRAM(s) Oral daily  NIFEdipine XL 60 milliGRAM(s) Oral daily  polyethylene glycol 3350 17 Gram(s) Oral daily  senna 2 Tablet(s) Oral at bedtime  tamsulosin 0.4 milliGRAM(s) Oral at bedtime    MEDICATIONS  (PRN):  ondansetron Injectable 4 milliGRAM(s) IV Push three times a day PRN Nausea and/or Vomiting    Vital Signs Last 24 Hrs  T(C): 36.7 (28 Sep 2021 07:32), Max: 37.1 (27 Sep 2021 13:37)  T(F): 98 (28 Sep 2021 07:32), Max: 98.7 (27 Sep 2021 13:37)  HR: 65 (28 Sep 2021 07:32) (65 - 103)  BP: 169/79 (28 Sep 2021 07:32) (114/60 - 169/79)  RR: 18 (28 Sep 2021 07:32) (18 - 20)  SpO2: 98% (28 Sep 2021 07:32) (91% - 98%)    Physical:  General: Alert and oriented, not in acute distress  Resp: Breathing unlabored; on 5L NC saturating well  Abdomen: softly distended, nontender, no RUQ tenderness; no guarding  : ibrahim catheter in place draining clear urine   Extremities: No pedal edema    I&O's Detail  27 Sep 2021 07:01  -  28 Sep 2021 07:00  --------------------------------------------------------  IN:  Total IN: 0 mL    OUT:    Indwelling Catheter - Urethral (mL): 1850 mL    Oral Fluid: 0 mL    Stool (mL): 0 mL  Total OUT: 1850 mL  Total NET: -1850 mL    LABS:                      9.7    6.89  )-----------( 314      ( 28 Sep 2021 07:39 )             29.7             09-27    142  |  109<H>  |  49<H>  ----------------------------<  158<H>  3.9   |  21<L>  |  3.19<H>    Ca    8.5      27 Sep 2021 06:17  Mg     2.4     09-27    TPro  7.1  /  Alb  2.4<L>  /  TBili  0.4  /  DBili  x   /  AST  20  /  ALT  14  /  AlkPhos  103  09-27      < from: CT Abdomen and Pelvis w/ Oral Cont (09.27.21 @ 15:53) >  FINDINGS:  LOWER CHEST: Small bilateral pleural effusions with compressive atelectasis of both lower lobes.  LIVER: Within normal limits.  BILE DUCTS: Normal caliber.  GALLBLADDER: Distended. Small layering gallstones.  SPLEEN: Within normal limits.  PANCREAS: Within normal limits.  ADRENALS: Within normal limits.  KIDNEYS/URETERS: No hydronephrosis. Non obstructing left upper pole calculus, measuring 0.6 cm.  BLADDER: Urinary bladder contains air and a Ibrahim catheter balloon.  REPRODUCTIVE ORGANS: Prostate is not enlarged.  BOWEL: No bowel obstruction. Appendix is normal. Colonic diverticulosis.  PERITONEUM: Mild presacral fluid.  VESSELS: Atherosclerotic changes.  RETROPERITONEUM/LYMPH NODES: No lymphadenopathy.  ABDOMINAL WALL: Within normal limits.  BONES: Degenerative changes. Sternotomy.    IMPRESSION:  No acute intra-abdominal pathology.  Small bilateral pleural effusions with compressive atelectasis of both lower lobes.  --- End of Report ---  < end of copied text >

## 2021-09-28 NOTE — PROGRESS NOTE ADULT - SUBJECTIVE AND OBJECTIVE BOX
C A R D I O L O G Y  **********************************     DATE OF SERVICE: 09-28-21    Patient denies chest pain breathing much better, had a BM  Review of symptoms otherwise negative.    ampicillin/sulbactam  IVPB 3 Gram(s) IV Intermittent every 12 hours  atorvastatin 80 milliGRAM(s) Oral at bedtime  bisacodyl Suppository 10 milliGRAM(s) Rectal once  cyanocobalamin 1000 MICROGram(s) Oral daily  ergocalciferol 63051 Unit(s) Oral <User Schedule>  ferrous    sulfate 325 milliGRAM(s) Oral daily  fluconAZOLE IVPB      fluconAZOLE IVPB 100 milliGRAM(s) IV Intermittent every 24 hours  heparin   Injectable 5000 Unit(s) SubCutaneous every 8 hours  influenza   Vaccine 0.5 milliLiter(s) IntraMuscular once  insulin lispro (ADMELOG) corrective regimen sliding scale   SubCutaneous Before meals and at bedtime  levoFLOXacin  Tablet 250 milliGRAM(s) Oral every 48 hours  metoprolol succinate  milliGRAM(s) Oral daily  NIFEdipine XL 60 milliGRAM(s) Oral daily  ondansetron Injectable 4 milliGRAM(s) IV Push three times a day PRN  polyethylene glycol 3350 17 Gram(s) Oral daily  senna 2 Tablet(s) Oral at bedtime  tamsulosin 0.4 milliGRAM(s) Oral at bedtime                            9.7    6.89  )-----------( 314      ( 28 Sep 2021 07:39 )             29.7       Hemoglobin: 9.7 g/dL (09-28 @ 07:39)  Hemoglobin: 9.6 g/dL (09-27 @ 06:17)  Hemoglobin: 8.9 g/dL (09-26 @ 07:23)  Hemoglobin: 9.5 g/dL (09-25 @ 06:40)  Hemoglobin: 9.4 g/dL (09-24 @ 06:15)      09-28    144  |  109<H>  |  39<H>  ----------------------------<  124<H>  3.7   |  27  |  2.24<H>    Ca    8.7      28 Sep 2021 07:39  Phos  3.0     09-28  Mg     2.3     09-28    TPro  7.1  /  Alb  2.3<L>  /  TBili  0.5  /  DBili  x   /  AST  20  /  ALT  15  /  AlkPhos  101  09-28    Creatinine Trend: 2.24<--, 3.19<--, 3.83<--, 4.05<--, 3.97<--, 3.20<--    COAGS:     CARDIAC MARKERS ( 27 Sep 2021 19:41 )  1.220 ng/mL / x     / x     / x     / x      CARDIAC MARKERS ( 27 Sep 2021 11:04 )  1.460 ng/mL / x     / x     / x     / x            T(C): 36.7 (09-28-21 @ 11:07), Max: 37.1 (09-27-21 @ 13:37)  HR: 85 (09-28-21 @ 11:07) (65 - 103)  BP: 110/66 (09-28-21 @ 11:07) (110/66 - 169/79)  RR: 18 (09-28-21 @ 11:07) (18 - 20)  SpO2: 98% (09-28-21 @ 11:07) (91% - 98%)  Wt(kg): --    I&O's Summary    27 Sep 2021 07:01  -  28 Sep 2021 07:00  --------------------------------------------------------  IN: 0 mL / OUT: 1850 mL / NET: -1850 mL        HEENT:  (-)icterus (-)pallor  CV: N S1 S2 1/6 TRINIDAD (+)2 Pulses B/l  Resp:  Clear to ausculatation B/L, normal effort  GI: (+) BS Soft, NT, ND  Lymph:  (-)Edema, (-)obvious lymphadenopathy  Skin: Warm to touch, Normal turgor  Psych: Appropriate mood and affect      ASSESSMENT/PLAN: 	76y  Male PMH DM on insulin, HTN, HLD, normal lV fx moderate to severe AS 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    - keep net negative  - Resolving ATN  - F/U bone biopsy result to determine length of abx  - Positive trop noted, likely due to increased demand and renal failure further work up will depend on clinical course    Zak Wilkins MD, [facility]  BEEPER [phone]   Detail Level: Detailed Was A Bandage Applied: Yes Punch Size In Mm: 4 Biopsy Type: H and E Anesthesia Type: 1% lidocaine with epinephrine Anesthesia Volume In Cc (Will Not Render If 0): 2 Additional Anesthesia Volume In Cc (Will Not Render If 0): 0 Hemostasis: Electrocautery Epidermal Sutures: none, closed by secondary intention Wound Care: Petrolatum Dressing: Band-Aid Patient Will Remove Sutures At Home?: No Lab: [id number] Lab Facility: [id number] Consent: Written consent was obtained and risks were reviewed including but not limited to scarring, infection, bleeding, scabbing, incomplete removal, nerve damage and allergy to anesthesia. Post-Care Instructions: I reviewed with the patient in detail post-care instructions. Patient is to keep the biopsy site dry overnight, and then apply bacitracin twice daily until healed. Patient may apply hydrogen peroxide soaks to remove any crusting. Home Suture Removal Text: Patient was provided a home suture removal kit and will remove their sutures at home.  If they have any questions or difficulties they will call the office. Notification Instructions: Patient will be notified of biopsy results. However, patient instructed to call the office if not contacted within 2 weeks. Billing Type: Third-Party Bill Information: Selecting Yes will display possible errors in your note based on the variables you have selected. This validation is only offered as a suggestion for you. PLEASE NOTE THAT THE VALIDATION TEXT WILL BE REMOVED WHEN YOU FINALIZE YOUR NOTE. IF YOU WANT TO FAX A PRELIMINARY NOTE YOU WILL NEED TO TOGGLE THIS TO 'NO' IF YOU DO NOT WANT IT IN YOUR FAXED NOTE.

## 2021-09-28 NOTE — PROGRESS NOTE ADULT - SUBJECTIVE AND OBJECTIVE BOX
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 s/p OR debridement today. Nephrology consulted for abrupt rise in SCr.   s/p ibrahim placement for distended bladder on  with ~400ml of urine o/p  - pt with SOB; CXR with pulmonary edema- pt given Lasix 80mg IV x1. Concern for aspiration PNA    Hospital Medications: Medications reviewed.  REVIEW OF SYSTEMS:  CONSTITUTIONAL: No fevers or chills  RESPIRATORY: no shortness of breath  CARDIOVASCULAR: No chest pain.  GASTROINTESTINAL: No nausea or vomiting, ordiarrhea or abdominal pain    VASCULAR: No bilateral lower extremity edema. Rt foot - mild  pain    VITALS:  T(F): 98.1 (21 @ 11:07), Max: 98.7 (21 @ 13:37)  HR: 85 (21 @ 11:07)  BP: 110/66 (21 @ 11:07)  RR: 18 (21 @ 11:07)  SpO2: 98% (21 @ 11:07)  Wt(kg): --     @ 07:01  -   @ 07:00  --------------------------------------------------------  IN: 0 mL / OUT: 1850 mL / NET: -1850 mL      PHYSICAL EXAM:  Gen: NAD, calm  HEENT: MMM  Neck: no JVD  Cards: RRR, +S1/S2, +TRINIDAD  Resp: +mild rales at bases  GI: soft, NT/ mild distention  Extremities: no LE edema B/L  : +ibrahim  Derm: Rt foot wrapped/ wound vac    LABS:      144  |  109<H>  |  39<H>  ----------------------------<  124<H>  3.7   |  27  |  2.24<H>    Ca    8.7      28 Sep 2021 07:39  Phos  3.0       Mg     2.3         TPro  7.1  /  Alb  2.3<L>  /  TBili  0.5  /  DBili      /  AST  20  /  ALT  15  /  AlkPhos  101      Creatinine Trend: 2.24 <--, 3.19 <--, 3.83 <--, 4.05 <--, 3.97 <--, 3.20 <--, 2.52 <--, 2.38 <--                        9.7    6.89  )-----------( 314      ( 28 Sep 2021 07:39 )             29.7     Urine Studies:  Urinalysis Basic - ( 22 Sep 2021 16:14 )    Color: Yellow / Appearance: Slightly Turbid / S.020 / pH:   Gluc:  / Ketone: Trace  / Bili: Negative / Urobili: Negative   Blood:  / Protein: 30 mg/dL / Nitrite: Negative   Leuk Esterase: Small / RBC: 0-2 /HPF / WBC 3-5 /HPF   Sq Epi:  / Non Sq Epi: Moderate /HPF / Bacteria: Moderate /HPF      Creatinine, Random Urine: 131 mg/dL ( @ 16:14)  Chloride, Random Urine: 52 mmol/L ( @ 16:14)  Sodium, Random Urine: 53 mmol/L ( @ 16:14)

## 2021-09-29 LAB
ANION GAP SERPL CALC-SCNC: 9 MMOL/L — SIGNIFICANT CHANGE UP (ref 5–17)
BUN SERPL-MCNC: 33 MG/DL — HIGH (ref 7–18)
CALCIUM SERPL-MCNC: 9 MG/DL — SIGNIFICANT CHANGE UP (ref 8.4–10.5)
CHLORIDE SERPL-SCNC: 110 MMOL/L — HIGH (ref 96–108)
CK MB BLD-MCNC: 2.2 % — SIGNIFICANT CHANGE UP (ref 0–3.5)
CK MB CFR SERPL CALC: 1 NG/ML — SIGNIFICANT CHANGE UP (ref 0–3.6)
CK SERPL-CCNC: 45 U/L — SIGNIFICANT CHANGE UP (ref 35–232)
CO2 SERPL-SCNC: 27 MMOL/L — SIGNIFICANT CHANGE UP (ref 22–31)
CREAT SERPL-MCNC: 1.73 MG/DL — HIGH (ref 0.5–1.3)
GLUCOSE BLDC GLUCOMTR-MCNC: 121 MG/DL — HIGH (ref 70–99)
GLUCOSE BLDC GLUCOMTR-MCNC: 129 MG/DL — HIGH (ref 70–99)
GLUCOSE BLDC GLUCOMTR-MCNC: 139 MG/DL — HIGH (ref 70–99)
GLUCOSE BLDC GLUCOMTR-MCNC: 201 MG/DL — HIGH (ref 70–99)
GLUCOSE SERPL-MCNC: 122 MG/DL — HIGH (ref 70–99)
HCT VFR BLD CALC: 31.9 % — LOW (ref 39–50)
HGB BLD-MCNC: 10.3 G/DL — LOW (ref 13–17)
MAGNESIUM SERPL-MCNC: 2.5 MG/DL — SIGNIFICANT CHANGE UP (ref 1.6–2.6)
MCHC RBC-ENTMCNC: 29.6 PG — SIGNIFICANT CHANGE UP (ref 27–34)
MCHC RBC-ENTMCNC: 32.3 GM/DL — SIGNIFICANT CHANGE UP (ref 32–36)
MCV RBC AUTO: 91.7 FL — SIGNIFICANT CHANGE UP (ref 80–100)
NRBC # BLD: 0 /100 WBCS — SIGNIFICANT CHANGE UP (ref 0–0)
PHOSPHATE SERPL-MCNC: 2.8 MG/DL — SIGNIFICANT CHANGE UP (ref 2.5–4.5)
PLATELET # BLD AUTO: 390 K/UL — SIGNIFICANT CHANGE UP (ref 150–400)
POTASSIUM SERPL-MCNC: 3.9 MMOL/L — SIGNIFICANT CHANGE UP (ref 3.5–5.3)
POTASSIUM SERPL-SCNC: 3.9 MMOL/L — SIGNIFICANT CHANGE UP (ref 3.5–5.3)
RBC # BLD: 3.48 M/UL — LOW (ref 4.2–5.8)
RBC # FLD: 14.2 % — SIGNIFICANT CHANGE UP (ref 10.3–14.5)
SARS-COV-2 RNA SPEC QL NAA+PROBE: SIGNIFICANT CHANGE UP
SODIUM SERPL-SCNC: 146 MMOL/L — HIGH (ref 135–145)
TROPONIN I SERPL-MCNC: 1.05 NG/ML — HIGH (ref 0–0.04)
WBC # BLD: 8.63 K/UL — SIGNIFICANT CHANGE UP (ref 3.8–10.5)
WBC # FLD AUTO: 8.63 K/UL — SIGNIFICANT CHANGE UP (ref 3.8–10.5)

## 2021-09-29 PROCEDURE — 99232 SBSQ HOSP IP/OBS MODERATE 35: CPT

## 2021-09-29 PROCEDURE — 99231 SBSQ HOSP IP/OBS SF/LOW 25: CPT

## 2021-09-29 RX ADMIN — HEPARIN SODIUM 5000 UNIT(S): 5000 INJECTION INTRAVENOUS; SUBCUTANEOUS at 13:08

## 2021-09-29 RX ADMIN — Medication 81 MILLIGRAM(S): at 11:30

## 2021-09-29 RX ADMIN — HEPARIN SODIUM 5000 UNIT(S): 5000 INJECTION INTRAVENOUS; SUBCUTANEOUS at 21:42

## 2021-09-29 RX ADMIN — Medication 2: at 17:05

## 2021-09-29 RX ADMIN — Medication 325 MILLIGRAM(S): at 11:30

## 2021-09-29 RX ADMIN — Medication 100 MILLIGRAM(S): at 06:55

## 2021-09-29 RX ADMIN — AMPICILLIN SODIUM AND SULBACTAM SODIUM 200 GRAM(S): 250; 125 INJECTION, POWDER, FOR SUSPENSION INTRAMUSCULAR; INTRAVENOUS at 06:56

## 2021-09-29 RX ADMIN — HEPARIN SODIUM 5000 UNIT(S): 5000 INJECTION INTRAVENOUS; SUBCUTANEOUS at 06:55

## 2021-09-29 RX ADMIN — PREGABALIN 1000 MICROGRAM(S): 225 CAPSULE ORAL at 11:30

## 2021-09-29 RX ADMIN — AMPICILLIN SODIUM AND SULBACTAM SODIUM 200 GRAM(S): 250; 125 INJECTION, POWDER, FOR SUSPENSION INTRAMUSCULAR; INTRAVENOUS at 17:05

## 2021-09-29 RX ADMIN — PANTOPRAZOLE SODIUM 40 MILLIGRAM(S): 20 TABLET, DELAYED RELEASE ORAL at 06:56

## 2021-09-29 RX ADMIN — TAMSULOSIN HYDROCHLORIDE 0.4 MILLIGRAM(S): 0.4 CAPSULE ORAL at 21:42

## 2021-09-29 RX ADMIN — FLUCONAZOLE 100 MILLIGRAM(S): 150 TABLET ORAL at 21:14

## 2021-09-29 RX ADMIN — PANTOPRAZOLE SODIUM 40 MILLIGRAM(S): 20 TABLET, DELAYED RELEASE ORAL at 17:05

## 2021-09-29 RX ADMIN — Medication 60 MILLIGRAM(S): at 06:55

## 2021-09-29 RX ADMIN — ATORVASTATIN CALCIUM 80 MILLIGRAM(S): 80 TABLET, FILM COATED ORAL at 21:43

## 2021-09-29 NOTE — PROGRESS NOTE ADULT - SUBJECTIVE AND OBJECTIVE BOX
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 Pt with sharp pain on the R lateral foot. As per daughter, pt was taking tylenol which did not help his pain prompting the patient to ask his daughter to take him to the hospital. Pt is a poor historian. Daughter states that the patient did not see his podiatrist after the surgery. No fever, chest, pain, palpitations, nausea, vomiting, diarrhea (17 Sep 2021 01:11)      Pt with right foot pain due to s/p 5th toe wound debridement and wound vac application on 9/22, POD #7. Being treated for OM. Pt with OREN, improved from Cr 4.05 to 1.73. US RUQ abdomen 9.28- Cholelithiasis without evidence of acute cholecystitis. CT AP- No acute intra-abdominal pathology.  Pain consulted for foot pain. Per primary team, NGT placed for distended loops of bowl which  patient self discontinued on 9/26 and had oxygen desaturations to the low 80's requiring non-rebreather. Attempts to replace NGT unsuccessful as patient could not tolerate. ICU consulted, remained on medicine.  Pt seen and examined at bedside. Pt laying in bed, lethargic, on room air. Answering questions appropriately Pt denies pain. Wound vac in place. Pt NPO. Pt denies constipation and itchiness. Last BM 9/27. Patient stated goal for pain control: to be comfortable in bed.    PAST MEDICAL & SURGICAL HISTORY:  HTN (hypertension)    HLD (hyperlipidemia)    DM (diabetes mellitus)    CAD (coronary artery disease)    Glaucoma, angle-closure    S/P CABG (coronary artery bypass graft)    History of thyroid surgery        FAMILY HISTORY:  Family history of acute myocardial infarction (Father)    Family history of diabetes mellitus (Mother, Sibling)    Family history of hypertension (Mother, Sibling)    Family history of hyperlipidemia (Mother, Sibling)        Social History:  Lives at home  Denies alcohol intake, smoking, and illicit drug use (17 Sep 2021 01:11)    Allergies    No Known Allergies    MEDICATIONS  (STANDING):  ampicillin/sulbactam  IVPB 3 Gram(s) IV Intermittent every 12 hours  aspirin  chewable 81 milliGRAM(s) Oral daily  atorvastatin 80 milliGRAM(s) Oral at bedtime  bisacodyl Suppository 10 milliGRAM(s) Rectal once  cyanocobalamin 1000 MICROGram(s) Oral daily  ergocalciferol 21115 Unit(s) Oral <User Schedule>  ferrous    sulfate 325 milliGRAM(s) Oral daily  fluconAZOLE IVPB      fluconAZOLE IVPB 100 milliGRAM(s) IV Intermittent every 24 hours  heparin   Injectable 5000 Unit(s) SubCutaneous every 8 hours  influenza   Vaccine 0.5 milliLiter(s) IntraMuscular once  insulin lispro (ADMELOG) corrective regimen sliding scale   SubCutaneous Before meals and at bedtime  levoFLOXacin  Tablet 250 milliGRAM(s) Oral every 48 hours  metoprolol succinate  milliGRAM(s) Oral daily  NIFEdipine XL 60 milliGRAM(s) Oral daily  pantoprazole  Injectable 40 milliGRAM(s) IV Push two times a day  polyethylene glycol 3350 17 Gram(s) Oral daily  senna 2 Tablet(s) Oral at bedtime  tamsulosin 0.4 milliGRAM(s) Oral at bedtime    MEDICATIONS  (PRN):  ondansetron Injectable 4 milliGRAM(s) IV Push three times a day PRN Nausea and/or Vomiting      Vital Signs Last 24 Hrs  T(C): 36.7 (29 Sep 2021 11:04), Max: 36.8 (28 Sep 2021 19:49)  T(F): 98.1 (29 Sep 2021 11:04), Max: 98.2 (28 Sep 2021 19:49)  HR: 73 (29 Sep 2021 11:04) (73 - 85)  BP: 144/72 (29 Sep 2021 11:04) (120/64 - 144/72)  BP(mean): --  RR: 18 (29 Sep 2021 11:04) (18 - 19)  SpO2: 98% (29 Sep 2021 11:04) (92% - 98%)  COVID-19 PCR: NotDetec (29 Sep 2021 06:58)  COVID-19 PCR: NotDetec (21 Sep 2021 10:54)  COVID-19 PCR: NotDetec (19 Sep 2021 15:05)  COVID-19 PCR: NotDetec (16 Sep 2021 22:24)  COVID-19 PCR: NotDetec (18 Aug 2021 18:23)  COVID-19 PCR: NotDetec (14 Aug 2021 09:41)    LABS: Reviewed                          10.3   8.63  )-----------( 390      ( 29 Sep 2021 08:09 )             31.9     09-29    146<H>  |  110<H>  |  33<H>  ----------------------------<  122<H>  3.9   |  27  |  1.73<H>    Ca    9.0      29 Sep 2021 08:09  Phos  2.8     09-29  Mg     2.5     09-29    TPro  7.1  /  Alb  2.3<L>  /  TBili  0.5  /  DBili  x   /  AST  20  /  ALT  15  /  AlkPhos  101  09-28      LIVER FUNCTIONS - ( 28 Sep 2021 07:39 )  Alb: 2.3 g/dL / Pro: 7.1 g/dL / ALK PHOS: 101 U/L / ALT: 15 U/L DA / AST: 20 U/L / GGT: x             CAPILLARY BLOOD GLUCOSE      POCT Blood Glucose.: 139 mg/dL (29 Sep 2021 11:18)  POCT Blood Glucose.: 121 mg/dL (29 Sep 2021 07:57)  POCT Blood Glucose.: 125 mg/dL (28 Sep 2021 20:50)  POCT Blood Glucose.: 165 mg/dL (28 Sep 2021 17:04)    COVID-19 PCR: NotDetec (29 Sep 2021 06:58)  COVID-19 PCR: NotDetec (21 Sep 2021 10:54)  COVID-19 PCR: NotDetec (19 Sep 2021 15:05)  COVID-19 PCR: NotDetec (16 Sep 2021 22:24)  COVID-19 PCR: NotDetec (18 Aug 2021 18:23)  COVID-19 PCR: NotDetec (14 Aug 2021 09:41)    Radiology: Reviewed  < from: CT Abdomen and Pelvis w/ Oral Cont (09.27.21 @ 15:53) >    EXAM:  CT ABDOMEN AND PELVIS OC                            PROCEDURE DATE:  09/27/2021          INTERPRETATION:  CLINICAL INFORMATION: Small bowel obstruction.    COMPARISON: None.    CONTRAST/COMPLICATIONS:  IV Contrast: NONE  Oral Contrast: Omnipaque 300  Complications: None reported at time of study completion    PROCEDURE:  CT of the Abdomen and Pelvis was performed.  Sagittal and coronal reformats were performed.    FINDINGS:  LOWER CHEST: Small bilateral pleural effusions with compressiveatelectasis of both lower lobes.    LIVER: Within normal limits.  BILE DUCTS: Normal caliber.  GALLBLADDER: Distended. Small layering gallstones.  SPLEEN: Within normal limits.  PANCREAS: Within normal limits.  ADRENALS: Within normal limits.  KIDNEYS/URETERS: No hydronephrosis. Nonobstructing left upper pole calculus, measuring 0.6 cm.    BLADDER: Urinary bladder contains air and a Molina catheter balloon.  REPRODUCTIVE ORGANS: Prostate is not enlarged.    BOWEL: No bowel obstruction. Appendix isnormal. Colonic diverticulosis.  PERITONEUM: Mild presacral fluid.  VESSELS: Atherosclerotic changes.  RETROPERITONEUM/LYMPH NODES: No lymphadenopathy.  ABDOMINAL WALL: Within normal limits.  BONES: Degenerative changes. Sternotomy.    IMPRESSION:  No acute intra-abdominal pathology.    Small bilateral pleural effusions with compressive atelectasis of both lower lobes.    --- End of Report ---            NICOLE BARRIOS MD; Attending Radiologist  This document has been electronically signed. Sep 27 2021  4:42PM    < end of copied text >    < from: US Abdomen Upper Quadrant Right (09.28.21 @ 12:13) >    EXAM:  US ABDOMEN RT UPR QUADRANT                            PROCEDURE DATE:  09/28/2021          INTERPRETATION:  CLINICAL INFORMATION: Distended gallbladder and cholelithiasis on CT scan    COMPARISON: CT scan abdomen pelvis of 9/27/2021    TECHNIQUE: Sonography of the right upper quadrant.    FINDINGS:    Liver: Within normal limits.  Bile ducts: Normal caliber. Common bile duct measures 5 mm.  Gallbladder: Distended with sludge and small stones. There is no wall thickening or pericholecystic fluid..  Pancreas: Visualized portions are within normal limits.  Right kidney: 10.7 cm. No hydronephrosis.  Ascites: None.  IVC: Visualized portions are within normal limits. Visualized aorta is also unremarkable  Note is made of right pleural effusion    IMPRESSION:    Cholelithiasis without evidence of acute cholecystitis  Note is made of right pleural effusion    --- End of Report ---            ODIN DRAPER MD; Attending Radiologist  This document has been electronically signed. Sep 28 2021  1:34PM    < end of copied text >    < from: MR Alexander No Cont, Right (09.17.21 @ 13:04) >    EXAM:  MR FOOT RT                            PROCEDURE DATE:  09/17/2021          INTERPRETATION:  Clinical Information: Recent fifth metatarsal head resection now with fifth digit pain, swelling and foul discharge.    Comparison: Radiographs of the right foot from 9/16/2021 and MRI the right foot from 8/16/2021.    Technique:  MRI of the right midfoot and forefoot.  Intravenous Contrast: None.    Findings:    There is a resection at the mid aspect of the fifth metatarsal shaft. There is a lateral soft tissue wound beginning at the level of the amputation which extends distally. There is susceptibility artifact in the region of the amputation consistent with postoperative change. There is hyperintense T2 marrow signal throughout the remaining fifth metatarsal and within the adjacent fourth metatarsal head which is nonspecific and could be related to recent postoperative changes although osteomyelitis is suspected.    There is edema and mild atrophy within the plantar muscles of the foot. There is minimal spurring at the first metatarsophalangeal and hallux sesamoid articulations.    Impression:  Resection at the mid aspect of the fifth metatarsal. Lateral soft tissue wound with marrow signal abnormality throughout the fifth metatarsal and within the adjacent fourth metatarsal head which is nonspecific in the setting of recent surgery although osteomyelitis is suspected.    --- End of Report ---            AMIE ORTIZ MD; Attending Radiologist  This document has been electronically signed. Sep 17 2021  1:49PM    < end of copied text >    < from: US Kidney and Bladder (09.23.21 @ 10:08) >    EXAM:  US KIDNEYS AND BLADDER                            PROCEDURE DATE:  09/23/2021          INTERPRETATION:  CLINICAL INFORMATION: Acute kidney injury. Urinary retention.    COMPARISON: 8/16/2021    TECHNIQUE: Sonography of the kidneys and bladder.    FINDINGS:    Right kidney: 11.7 cm. No renal mass, hydronephrosis or calculi. The right kidney is hyperechoic.    Left kidney: 10.9 cm. No renal mass, hydronephrosis or calculi. The left kidney is hyperechoic.    Urinary bladder: The bladder hasa volume of 520 cc which is largely distended. The bladder is otherwise grossly unremarkable. The patient has no urge to void.    IMPRESSION:    No hydronephrosis.    Both kidneys are hyperechoic for which clinical correlation with intrinsic medical renal disease is recommended.    Largely distended urinary bladder.    --- End of Report ---            RENETTA PERKINS MD; Attending Radiologist  This document has been electronically signed. Sep 23 2021 10:40AM    < end of copied text >      ORT Score -   Family Hx of substance abuse	Female	      Male  Alcohol 	                                           1                     3  Illegal drugs	                                   2                     3  Rx drugs                                           4 	                  4  Personal Hx of substance abuse		  Alcohol 	                                          3	                  3  Illegal drugs                                     4	                  4  Rx drugs                                            5 	                  5  Age between 16- 45 years	           1                     1  hx preadolescent sexual abuse	   3 	                  0  Psychological disease		  ADD, OCD, bipolar, schizophrenia   2	          2  Depression                                           1 	          1  Total: 0    a score of 3 or lower indicates low risk for opioid abuse		  a score of 4-7 indicates moderate risk for opioid abuse		  a score of 8 or higher indicates high risk for opioid abuse    REVIEW OF SYSTEMS:  CONSTITUTIONAL: No fever + fatigue  HEENT:  + [place], no change in vision  NECK: No pain or stiffness  RESPIRATORY: + intermittent wet cough;  No, wheezing, chills or hemoptysis; No shortness of breath  CARDIOVASCULAR: No chest pain, palpitations, dizziness, or leg swelling  GASTROINTESTINAL: No loss of appetite, decreased PO intake. No abdominal or epigastric pain. No nausea/ vomiting; No diarrhea or constipation.   GENITOURINARY: + urinary retention.   MUSCULOSKELETAL: Denies right foot pain, no swelling; + generalized weakness; no falls   NEURO: No headaches, No numbness/tingling b/l LE  PSYCHIATRIC: No depression, anxiety or difficulty sleeping    PHYSICAL EXAM:  GENERAL:  Alert & Oriented X3, lethargic, cooperative, NAD, Speech is soft.   RESPIRATORY: Respirations even and unlabored. + intermittent wet cough noted; Diminished to auscultation bilaterally; No rales, rhonchi, wheezing, or rubs; room air.  CARDIOVASCULAR: Normal S1/S2, regular rate and rhythm; + systolic murmur, No rubs, or gallops. No JVD. + cardiac monitor in place.   GASTROINTESTINAL:  Soft, Nontender, + distended; Bowel sounds present  GENITOURINARY: Urinary catheter draining small amount of clear yellow urine.   PERIPHERAL VASCULAR: + right foot wound dressing c/d/i connected to wound vac; Extremities warm without edema. 2+ radial Pulses, and 1+left pedal pulse, No cyanosis, No calf tenderness  MUSCULOSKELETAL: Motor Strength 4/5 B/L upper and 3/5 left lower extremities; + right LE + toe wiggle, right foot elevated on pillow; moves all extremities equally against gravity; ROM decreased b/l LE; + right  to palpation.   SKIN: + right foot wound ace dressing c/d/i connected to wound vac.     Risk factors associated with adverse outcomes related to opioid treatment  [ ]  Concurrent benzodiazepine use  [ ]  History/ Active substance use or alcohol use disorder  [ ] Psychiatric co-morbidity  [ ] Sleep apnea  [ ] COPD  [ ] BMI> 35  [ ] Liver dysfunction  [X ] Renal dysfunction  [ ] CHF  [ ] Smoker  [X ]  Age > 60 years    [X ]  [place]  Reviewed and Copied to Chart. See below.    Plan of care and goal oriented pain management treatment options were discussed with patient and /or primary care giver; all questions and concerns were addressed and care was aligned with patient's wishes.    Educated patient on goal oriented pain management treatment options     09-29-21 @ 12:35

## 2021-09-29 NOTE — PROGRESS NOTE ADULT - SUBJECTIVE AND OBJECTIVE BOX
[facility] NEPHROLOGY- PROGRESS NOTE    Patient is a 75yo Male with DM on insulin, HTN, HLD, CAD, s/p R partial 5th ray amputation 8/19/2021 p/w R foot pain and foul drainage a/w diabetic foot ulcer suspicious for osteomyelitis. s/p OR debridement today. Nephrology consulted for abrupt rise in SCr.   s/p ibrahim placement for distended bladder on 9/23 with ~400ml of urine o/p  9/27- pt with SOB; CXR with pulmonary edema- pt given Lasix 80mg IV x1. Concern for aspiration PNA    Hospital Medications: Medications reviewed.  REVIEW OF SYSTEMS:  CONSTITUTIONAL: No fevers or chills  RESPIRATORY: no shortness of breath  CARDIOVASCULAR: No chest pain.  GASTROINTESTINAL: No nausea or vomiting, ordiarrhea or abdominal pain    VASCULAR: No bilateral lower extremity edema. Rt foot - denies pain    VITALS:  T(F): 97.8 (09-29-21 @ 15:11), Max: 98.2 (09-28-21 @ 19:49)  HR: 76 (09-29-21 @ 15:11)  BP: 117/77 (09-29-21 @ 15:11)  RR: 18 (09-29-21 @ 15:11)  SpO2: 93% (09-29-21 @ 15:11)  Wt(kg): --    09-28 @ 07:01  -  09-29 @ 07:00  --------------------------------------------------------  IN: 0 mL / OUT: 800 mL / NET: -800 mL      PHYSICAL EXAM:  Gen: NAD, calm  HEENT: MMM  Neck: no JVD  Cards: RRR, +S1/S2, +TRINIDAD  Resp: decreased BS at bases  GI: soft, NT  Extremities: no LE edema B/L  : +ibrahim  Derm: Rt foot wrapped/ wound vac    LABS:  09-29    146<H>  |  110<H>  |  33<H>  ----------------------------<  122<H>  3.9   |  27  |  1.73<H>    Ca    9.0      29 Sep 2021 08:09  Phos  2.8     09-29  Mg     2.5     09-29    TPro  7.1  /  Alb  2.3<L>  /  TBili  0.5  /  DBili      /  AST  20  /  ALT  15  /  AlkPhos  101  09-28    Creatinine Trend: 1.73 <--, 2.24 <--, 3.19 <--, 3.83 <--, 4.05 <--, 3.97 <--, 3.20 <--                        10.3   8.63  )-----------( 390      ( 29 Sep 2021 08:09 )             31.9     Urine Studies:

## 2021-09-29 NOTE — PROGRESS NOTE ADULT - SUBJECTIVE AND OBJECTIVE BOX
Patient is seen and examined at the bed side, is afebrile.  The kidney function continue to improve.        REVIEW OF SYSTEMS: All other review systems are negative      ALLERGIES: No Known Allergies      Vital Signs Last 24 Hrs  T(C): 36.6 (29 Sep 2021 15:11), Max: 36.8 (28 Sep 2021 19:49)  T(F): 97.8 (29 Sep 2021 15:11), Max: 98.2 (28 Sep 2021 19:49)  HR: 76 (29 Sep 2021 15:11) (73 - 84)  BP: 117/77 (29 Sep 2021 15:11) (117/77 - 144/72)  BP(mean): --  RR: 18 (29 Sep 2021 15:11) (18 - 19)  SpO2: 93% (29 Sep 2021 15:11) (92% - 98%)      PHYSICAL EXAM:  GENERAL: Not in distress, on oxygen via NC  CHEST/LUNG:  Not using accessory muscles  HEART: s1 and s2 present  ABDOMEN:  Mild distended   EXTREMITIES: Right foot bandage in placed   CNS: Awake, Alert  and confused       LABS:                                   10.3   8.63  )-----------( 390      ( 29 Sep 2021 08:09 )             31.9                         9.7    6.89  )-----------( 314      ( 28 Sep 2021 07:39 )             29.7       09-29    146<H>  |  110<H>  |  33<H>  ----------------------------<  122<H>  3.9   |  27  |  1.73<H>    Ca    9.0      29 Sep 2021 08:09  Phos  2.8     09-29  Mg     2.5     09-29    TPro  7.1  /  Alb  2.3<L>  /  TBili  0.5  /  DBili  x   /  AST  20  /  ALT  15  /  AlkPhos  101  09-28 09-28    144  |  109<H>  |  39<H>  ----------------------------<  124<H>  3.7   |  27  |  2.24<H>    Ca    8.7      28 Sep 2021 07:39  Phos  3.0     09-28  Mg     2.3     09-28    TPro  7.1  /  Alb  2.3<L>  /  TBili  0.5  /  DBili  x   /  AST  20  /  ALT  15  /  AlkPhos  101  09-28 09-25    139  |  107  |  41<H>  ----------------------------<  134<H>  4.1   |  21<L>  |  4.05<H>    Ca    8.6      25 Sep 2021 06:40    TPro  6.6  /  Alb  2.3<L>  /  TBili  0.5  /  DBili  x   /  AST  25  /  ALT  15  /  AlkPhos  102  09-25        CAPILLARY BLOOD GLUCOSE  POCT Blood Glucose.: 134 mg/dL (17 Sep 2021 17:11)  POCT Blood Glucose.: 128 mg/dL (17 Sep 2021 11:06)  POCT Blood Glucose.: 157 mg/dL (17 Sep 2021 04:10)      MEDICATIONS  (STANDING):    ampicillin/sulbactam  IVPB 3 Gram(s) IV Intermittent every 12 hours  aspirin  chewable 81 milliGRAM(s) Oral daily  atorvastatin 80 milliGRAM(s) Oral at bedtime  bisacodyl Suppository 10 milliGRAM(s) Rectal once  cyanocobalamin 1000 MICROGram(s) Oral daily  ergocalciferol 46105 Unit(s) Oral <User Schedule>  ferrous    sulfate 325 milliGRAM(s) Oral daily  fluconAZOLE IVPB      fluconAZOLE IVPB 100 milliGRAM(s) IV Intermittent every 24 hours  heparin   Injectable 5000 Unit(s) SubCutaneous every 8 hours  influenza   Vaccine 0.5 milliLiter(s) IntraMuscular once  insulin lispro (ADMELOG) corrective regimen sliding scale   SubCutaneous Before meals and at bedtime  levoFLOXacin  Tablet 250 milliGRAM(s) Oral every 48 hours  metoprolol succinate  milliGRAM(s) Oral daily  NIFEdipine XL 60 milliGRAM(s) Oral daily  pantoprazole  Injectable 40 milliGRAM(s) IV Push two times a day  polyethylene glycol 3350 17 Gram(s) Oral daily  senna 2 Tablet(s) Oral at bedtime  tamsulosin 0.4 milliGRAM(s) Oral at bedtime      RADIOLOGY & ADDITIONAL TESTS:    Collected Date/Time: 9/22/2021 08:42 EDT   Received Date/Time: 9/23/2021 09:29 EDT   Surgical Pathology Report - Auth (Verified)   Specimen(s) Submitted   1 Right 5th Toe Wound Debridement r/o Osteomyelitis   Final Diagnosis   Right fifth toe; wound debridement:   - Bone with acute osteomyelitis and necrotic periosteal tissue.     9/28/21: US Abdomen Upper Quadrant Right (09.28.21 @ 12:13) Cholelithiasis without evidence of acute cholecystitis. Note is made of right pleural effusion    9/27/21: CT Abdomen and Pelvis w/ Oral Cont (09.27.21 @ 15:53) >No acute intra-abdominal pathology.    Small bilateral pleural effusions with compressive atelectasis of both lower lobes.    9/26/21: Xray Chest 1 View-PORTABLE IMMEDIATE (Xray Chest 1 View-PORTABLE IMMEDIATE .) (09.26.21 @ 15:28) : Small bilateral pleural effusions and mild pulmonary edema. A right lower lobe pneumonia is not excluded.      9/26/21: Xray Abdomen 1 View Portable, IMMEDIATE (Xray Abdomen 1 View Portable, IMMEDIATE .) (09.26.21 @ 15:28) : There is a nasogastric tube with its tip in the stomach. There is gaseous distention of the colon. No pathologic calcifications are seen. The osseous structures are intact with degenerative change is present in the spine.      9/17/21 : MR Foot No Cont, Right (09.17.21 @ 13:04) : Resection at the mid aspect of the fifth metatarsal. Lateral soft tissue wound with marrow signal abnormality throughout the fifth metatarsal and within the adjacent fourth metatarsal head which is nonspecific in the setting of recent surgery although osteomyelitis is suspected.    9/16/21 : Xray Foot AP + Lateral + Oblique, Right (09.16.21 @ 15:03) : No acute finding. No plain film evidence of osteomyelitis.        MICROBIOLOGY DATA:    Urine Microscopic-Add On (NC) (09.22.21 @ 16:14)   Red Blood Cell - Urine: 0-2 /HPF   White Blood Cell - Urine: 3-5 /HPF   Bacteria: Moderate /HPF   Comment - Urine: yeast cells present   Epithelial Cells: Moderate /HPF     Culture - Tissue with Gram Stain (09.22.21 @ 13:54)   Gram Stain: No polymorphonuclear cells seen per low power field   No organisms seen per oil power field   Specimen Source: .Tissue Right 5th toe r/o osteomyeltis   Culture Results: No growth     COVID-19 David Domain Antibody (09.18.21 @ 12:55)   COVID-19 David Domain Antibody Result: >250.00:    Culture - Blood (09.17.21 @ 10:28)   Specimen Source: .Blood Blood-Peripheral   Culture Results: No growth to date.     Culture - Blood (09.17.21 @ 10:28)   Specimen Source: .Blood Blood-Venous   Culture Results: No growth to date.     Culture - Surgical Swab (09.16.21 @ 21:42)   - Amikacin: S <=16   - Amikacin: S <=16   - Amoxicillin/Clavulanic Acid: S <=8/4   - Ampicillin: R >16 These ampicillin results predict results for amoxicillin   - Ampicillin: S <=2 Predicts results to ampicillin/sulbactam, amoxacillin-clavulanate and piperacillin-tazobactam.   - Ampicillin/Sulbactam: S 8/4 Enterobacter, Citrobacter, and Serratia may develop resistance during prolonged therapy (3-4 days)   - Ampicillin/Sulbactam: R >16/8   - Aztreonam: S <=4   - Aztreonam: S <=4   - Cefazolin: R 16 Enterobacter, Citrobacter, and Serratia may develop resistance during prolonged therapy (3-4 days)   - Cefazolin: R >16   - Cefepime: S <=2   - Cefepime: S <=2   - Cefoxitin: S <=8   - Cefoxitin: S <=8   - Ceftazidime: S <=1   - Ceftriaxone: S <=1   - Ceftriaxone: S <=1 Enterobacter, Citrobacter, and Serratia may develop resistance during prolonged therapy   - Ciprofloxacin: S <=0.25   - Ciprofloxacin: S <=0.25   - Ertapenem: S <=0.5   - Gentamicin: S <=2   - Gentamicin: S <=2   - Imipenem: S <=1   - Levofloxacin: S <=0.5   - Levofloxacin: S <=0.5   - Meropenem: S <=1   - Meropenem: S <=1   - Piperacillin/Tazobactam: S <=8   - Piperacillin/Tazobactam: S <=8   - Tetra/Doxy: S 4   - Tobramycin: S <=2   - Trimethoprim/Sulfamethoxazole: S <=0.5/9.5   - Trimethoprim/Sulfamethoxazole: S <=0.5/9.5   - Vancomycin: S 2   Specimen Source: .Surgical Swab right foot wound   Culture Results:   Culture yields >4 types of aerobic and/or anaerobic bacteria   Call client services within 7 days if further workup is clinically   indicated. Culture includes   Rare Aeromonas hydrophila/caviae   Few Klebsiella oxytoca/Raoutella ornithinolytica   Few Enterococcus faecalis   Few Streptococcus agalactiae (Group B) isolated   Group B streptococci are susceptible to ampicillin,   penicillin and cefazolin, but may be resistant to   erythromycin and clindamycin.   Recommendations for intrapartum prophylaxis for Group B   streptococci are penicillin or ampicillin.   Organism Identification: Aeromonas hydrophila/caviae   Klebsiella oxytoca /Raoutella ornithinolytica   Enterococcus faecalis   Organism: Aeromonas hydrophila/caviae   Organism: Klebsiella oxytoca /Raoutella ornithinolytica   Organism: Enterococcus faecalis   COVID-19 PCR . (09.16.21 @ 22:24)   COVID-19 PCR: NotDetec:         Patient is seen and examined at the bed side, is afebrile.  The kidney function continue to improve. The events noted.       REVIEW OF SYSTEMS: All other review systems are negative      ALLERGIES: No Known Allergies      Vital Signs Last 24 Hrs  T(C): 36.6 (29 Sep 2021 15:11), Max: 36.8 (28 Sep 2021 19:49)  T(F): 97.8 (29 Sep 2021 15:11), Max: 98.2 (28 Sep 2021 19:49)  HR: 76 (29 Sep 2021 15:11) (73 - 84)  BP: 117/77 (29 Sep 2021 15:11) (117/77 - 144/72)  BP(mean): --  RR: 18 (29 Sep 2021 15:11) (18 - 19)  SpO2: 93% (29 Sep 2021 15:11) (92% - 98%)      PHYSICAL EXAM:  GENERAL: Not in distress, on oxygen via NC  CHEST/LUNG:  Not using accessory muscles  HEART: s1 and s2 present  ABDOMEN:  Mild distended   EXTREMITIES: Right foot bandage in placed   CNS: Awake, Alert  and confused       LABS:                                   10.3   8.63  )-----------( 390      ( 29 Sep 2021 08:09 )             31.9                         9.7    6.89  )-----------( 314      ( 28 Sep 2021 07:39 )             29.7       09-29    146<H>  |  110<H>  |  33<H>  ----------------------------<  122<H>  3.9   |  27  |  1.73<H>    Ca    9.0      29 Sep 2021 08:09  Phos  2.8     09-29  Mg     2.5     09-29    TPro  7.1  /  Alb  2.3<L>  /  TBili  0.5  /  DBili  x   /  AST  20  /  ALT  15  /  AlkPhos  101  09-28 09-28    144  |  109<H>  |  39<H>  ----------------------------<  124<H>  3.7   |  27  |  2.24<H>    Ca    8.7      28 Sep 2021 07:39  Phos  3.0     09-28  Mg     2.3     09-28    TPro  7.1  /  Alb  2.3<L>  /  TBili  0.5  /  DBili  x   /  AST  20  /  ALT  15  /  AlkPhos  101  09-28 09-25    139  |  107  |  41<H>  ----------------------------<  134<H>  4.1   |  21<L>  |  4.05<H>    Ca    8.6      25 Sep 2021 06:40    TPro  6.6  /  Alb  2.3<L>  /  TBili  0.5  /  DBili  x   /  AST  25  /  ALT  15  /  AlkPhos  102  09-25        CAPILLARY BLOOD GLUCOSE  POCT Blood Glucose.: 134 mg/dL (17 Sep 2021 17:11)  POCT Blood Glucose.: 128 mg/dL (17 Sep 2021 11:06)  POCT Blood Glucose.: 157 mg/dL (17 Sep 2021 04:10)      MEDICATIONS  (STANDING):    ampicillin/sulbactam  IVPB 3 Gram(s) IV Intermittent every 12 hours  aspirin  chewable 81 milliGRAM(s) Oral daily  atorvastatin 80 milliGRAM(s) Oral at bedtime  bisacodyl Suppository 10 milliGRAM(s) Rectal once  cyanocobalamin 1000 MICROGram(s) Oral daily  ergocalciferol 98526 Unit(s) Oral <User Schedule>  ferrous    sulfate 325 milliGRAM(s) Oral daily  fluconAZOLE IVPB      fluconAZOLE IVPB 100 milliGRAM(s) IV Intermittent every 24 hours  heparin   Injectable 5000 Unit(s) SubCutaneous every 8 hours  influenza   Vaccine 0.5 milliLiter(s) IntraMuscular once  insulin lispro (ADMELOG) corrective regimen sliding scale   SubCutaneous Before meals and at bedtime  levoFLOXacin  Tablet 250 milliGRAM(s) Oral every 48 hours  metoprolol succinate  milliGRAM(s) Oral daily  NIFEdipine XL 60 milliGRAM(s) Oral daily  pantoprazole  Injectable 40 milliGRAM(s) IV Push two times a day  polyethylene glycol 3350 17 Gram(s) Oral daily  senna 2 Tablet(s) Oral at bedtime  tamsulosin 0.4 milliGRAM(s) Oral at bedtime      RADIOLOGY & ADDITIONAL TESTS:    Collected Date/Time: 9/22/2021 08:42 EDT   Received Date/Time: 9/23/2021 09:29 EDT   Surgical Pathology Report - Auth (Verified)   Specimen(s) Submitted   1 Right 5th Toe Wound Debridement r/o Osteomyelitis   Final Diagnosis   Right fifth toe; wound debridement:   - Bone with acute osteomyelitis and necrotic periosteal tissue.     9/28/21: US Abdomen Upper Quadrant Right (09.28.21 @ 12:13) Cholelithiasis without evidence of acute cholecystitis. Note is made of right pleural effusion    9/27/21: CT Abdomen and Pelvis w/ Oral Cont (09.27.21 @ 15:53) >No acute intra-abdominal pathology.    Small bilateral pleural effusions with compressive atelectasis of both lower lobes.    9/26/21: Xray Chest 1 View-PORTABLE IMMEDIATE (Xray Chest 1 View-PORTABLE IMMEDIATE .) (09.26.21 @ 15:28) : Small bilateral pleural effusions and mild pulmonary edema. A right lower lobe pneumonia is not excluded.      9/26/21: Xray Abdomen 1 View Portable, IMMEDIATE (Xray Abdomen 1 View Portable, IMMEDIATE .) (09.26.21 @ 15:28) : There is a nasogastric tube with its tip in the stomach. There is gaseous distention of the colon. No pathologic calcifications are seen. The osseous structures are intact with degenerative change is present in the spine.      9/17/21 : MR Foot No Cont, Right (09.17.21 @ 13:04) : Resection at the mid aspect of the fifth metatarsal. Lateral soft tissue wound with marrow signal abnormality throughout the fifth metatarsal and within the adjacent fourth metatarsal head which is nonspecific in the setting of recent surgery although osteomyelitis is suspected.    9/16/21 : Xray Foot AP + Lateral + Oblique, Right (09.16.21 @ 15:03) : No acute finding. No plain film evidence of osteomyelitis.        MICROBIOLOGY DATA:    Urine Microscopic-Add On (NC) (09.22.21 @ 16:14)   Red Blood Cell - Urine: 0-2 /HPF   White Blood Cell - Urine: 3-5 /HPF   Bacteria: Moderate /HPF   Comment - Urine: yeast cells present   Epithelial Cells: Moderate /HPF     Culture - Tissue with Gram Stain (09.22.21 @ 13:54)   Gram Stain: No polymorphonuclear cells seen per low power field   No organisms seen per oil power field   Specimen Source: .Tissue Right 5th toe r/o osteomyeltis   Culture Results: No growth     COVID-19 David Domain Antibody (09.18.21 @ 12:55)   COVID-19 David Domain Antibody Result: >250.00:    Culture - Blood (09.17.21 @ 10:28)   Specimen Source: .Blood Blood-Peripheral   Culture Results: No growth to date.     Culture - Blood (09.17.21 @ 10:28)   Specimen Source: .Blood Blood-Venous   Culture Results: No growth to date.     Culture - Surgical Swab (09.16.21 @ 21:42)   - Amikacin: S <=16   - Amikacin: S <=16   - Amoxicillin/Clavulanic Acid: S <=8/4   - Ampicillin: R >16 These ampicillin results predict results for amoxicillin   - Ampicillin: S <=2 Predicts results to ampicillin/sulbactam, amoxacillin-clavulanate and piperacillin-tazobactam.   - Ampicillin/Sulbactam: S 8/4 Enterobacter, Citrobacter, and Serratia may develop resistance during prolonged therapy (3-4 days)   - Ampicillin/Sulbactam: R >16/8   - Aztreonam: S <=4   - Aztreonam: S <=4   - Cefazolin: R 16 Enterobacter, Citrobacter, and Serratia may develop resistance during prolonged therapy (3-4 days)   - Cefazolin: R >16   - Cefepime: S <=2   - Cefepime: S <=2   - Cefoxitin: S <=8   - Cefoxitin: S <=8   - Ceftazidime: S <=1   - Ceftriaxone: S <=1   - Ceftriaxone: S <=1 Enterobacter, Citrobacter, and Serratia may develop resistance during prolonged therapy   - Ciprofloxacin: S <=0.25   - Ciprofloxacin: S <=0.25   - Ertapenem: S <=0.5   - Gentamicin: S <=2   - Gentamicin: S <=2   - Imipenem: S <=1   - Levofloxacin: S <=0.5   - Levofloxacin: S <=0.5   - Meropenem: S <=1   - Meropenem: S <=1   - Piperacillin/Tazobactam: S <=8   - Piperacillin/Tazobactam: S <=8   - Tetra/Doxy: S 4   - Tobramycin: S <=2   - Trimethoprim/Sulfamethoxazole: S <=0.5/9.5   - Trimethoprim/Sulfamethoxazole: S <=0.5/9.5   - Vancomycin: S 2   Specimen Source: .Surgical Swab right foot wound   Culture Results:   Culture yields >4 types of aerobic and/or anaerobic bacteria   Call client services within 7 days if further workup is clinically   indicated. Culture includes   Rare Aeromonas hydrophila/caviae   Few Klebsiella oxytoca/Raoutella ornithinolytica   Few Enterococcus faecalis   Few Streptococcus agalactiae (Group B) isolated   Group B streptococci are susceptible to ampicillin,   penicillin and cefazolin, but may be resistant to   erythromycin and clindamycin.   Recommendations for intrapartum prophylaxis for Group B   streptococci are penicillin or ampicillin.   Organism Identification: Aeromonas hydrophila/caviae   Klebsiella oxytoca /Raoutella ornithinolytica   Enterococcus faecalis   Organism: Aeromonas hydrophila/caviae   Organism: Klebsiella oxytoca /Raoutella ornithinolytica   Organism: Enterococcus faecalis   COVID-19 PCR . (09.16.21 @ 22:24)   COVID-19 PCR: NotDetec:

## 2021-09-29 NOTE — PROGRESS NOTE ADULT - SUBJECTIVE AND OBJECTIVE BOX
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 He tried taking Tylenol for the pain with minimal relief. Of note, the patient never followed up with podiatry after his procedure in August. MRI confirms suspected osteomyelitis - patient followed by podiatry and ID, patient undergo debridement and wound VAC placement. Patient currently on course of Unasyn and Levofloxacin, ID to follow up final surgical path. 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 Nephrology Dr. Rodriguez following. Hospital course complicated by abdominal distention and constipation for which he received lactulose and vomited. The patient had subsequent respiratory distress and it is suspected he aspirated as CXR showed possible RLL PNA. AXR shows distended loops of bowel for which NG tube was placed which the patient self d/c'd overnight 9/26 - re-attempts to replace NG tube were unsuccessful as patient was non-cooperative. Surgery following. CT abdomen deferred for now as patient is unstable and will not tolerate lying flat. ICU consulted given patient's deterioration in clinical status.       --- acute pulmonary edema, troponin elevated-- likely demand ischemia, BNP > 20,000 : transfer to telemetry    INTERVAL HPI/OVERNIGHT EVENTS:   Reports nausea, otherwise no acute complaints or overnight events    REVIEW OF SYSTEMS:  CONSTITUTIONAL: No fever, weight loss, or fatigue  RESPIRATORY: No cough, wheezing, chills or hemoptysis; No shortness of breath  CARDIOVASCULAR: No chest pain, palpitations, dizziness, or leg swelling  GASTROINTESTINAL: No abdominal pain. No nausea, vomiting, or hematemesis; No diarrhea or constipation. No melena or hematochezia.  GENITOURINARY: No dysuria or hematuria, urinary frequency  NEUROLOGICAL: No headaches, memory loss, loss of strength, numbness, or tremors  SKIN: No itching, burning, rashes, or lesions     MEDICATIONS  (STANDING):  aluminum hydroxide/magnesium hydroxide/simethicone Suspension 30 milliLiter(s) Oral once  ampicillin/sulbactam  IVPB 3 Gram(s) IV Intermittent every 12 hours  atorvastatin 80 milliGRAM(s) Oral at bedtime  bisacodyl Suppository 10 milliGRAM(s) Rectal once  cyanocobalamin 1000 MICROGram(s) Oral daily  ergocalciferol 95840 Unit(s) Oral <User Schedule>  ferrous    sulfate 325 milliGRAM(s) Oral daily  fluconAZOLE IVPB      fluconAZOLE IVPB 100 milliGRAM(s) IV Intermittent every 24 hours  heparin   Injectable 5000 Unit(s) SubCutaneous every 8 hours  influenza   Vaccine 0.5 milliLiter(s) IntraMuscular once  insulin lispro (ADMELOG) corrective regimen sliding scale   SubCutaneous Before meals and at bedtime  levoFLOXacin  Tablet 250 milliGRAM(s) Oral every 48 hours  metoprolol succinate  milliGRAM(s) Oral daily  NIFEdipine XL 60 milliGRAM(s) Oral daily  pantoprazole  Injectable 40 milliGRAM(s) IV Push two times a day  polyethylene glycol 3350 17 Gram(s) Oral daily  senna 2 Tablet(s) Oral at bedtime  tamsulosin 0.4 milliGRAM(s) Oral at bedtime    MEDICATIONS  (PRN):  ondansetron Injectable 4 milliGRAM(s) IV Push three times a day PRN Nausea and/or Vomiting      Vital Signs Last 24 Hrs  T(C): 36.7 (28 Sep 2021 11:07), Max: 36.8 (27 Sep 2021 23:34)  T(F): 98.1 (28 Sep 2021 11:07), Max: 98.2 (27 Sep 2021 23:34)  HR: 85 (28 Sep 2021 11:07) (65 - 103)  BP: 110/66 (28 Sep 2021 11:07) (110/66 - 169/79)  BP(mean): --  RR: 18 (28 Sep 2021 11:07) (18 - 19)  SpO2: 98% (28 Sep 2021 11:07) (91% - 98%)    PHYSICAL EXAMINATION:  GENERAL: NAD, well-developed  HEAD:  Atraumatic, Normocephalic  EYES: EOMI, PERRLA, conjunctiva and sclera clear  NECK: Supple, No JVD  CHEST/LUNG: bilateral rhonchi  HEART: III/VI systolic ejection murmur   ABDOMEN: distended abdomen, +BS  EXTREMITIES: + wound VAC to RLE   PSYCH: AAOx3, agitated, non-cooperative   NEUROLOGY: non-focal  SKIN: No rashes or lesions                        9.7    6.89  )-----------( 314      ( 28 Sep 2021 07:39 )             29.7     09-28    144  |  109<H>  |  39<H>  ----------------------------<  124<H>  3.7   |  27  |  2.24<H>    Ca    8.7      28 Sep 2021 07:39  Phos  3.0     09-28  Mg     2.3     09-28    TPro  7.1  /  Alb  2.3<L>  /  TBili  0.5  /  DBili  x   /  AST  20  /  ALT  15  /  AlkPhos  101  09-28    LIVER FUNCTIONS - ( 28 Sep 2021 07:39 )  Alb: 2.3 g/dL / Pro: 7.1 g/dL / ALK PHOS: 101 U/L / ALT: 15 U/L DA / AST: 20 U/L / GGT: x           CARDIAC MARKERS ( 28 Sep 2021 07:39 )  1.510 ng/mL / x     / 56 U/L / x     / 1.0 ng/mL  CARDIAC MARKERS ( 27 Sep 2021 19:41 )  1.220 ng/mL / x     / x     / x     / x      CARDIAC MARKERS ( 27 Sep 2021 11:04 )  1.460 ng/mL / x     / x     / x     / x              I&O's Summary    27 Sep 2021 07:01  -  28 Sep 2021 07:00  --------------------------------------------------------  IN: 0 mL / OUT: 1850 mL / NET: -1850 mL    28 Sep 2021 07:01  -  28 Sep 2021 15:30  --------------------------------------------------------  IN: 0 mL / OUT: 300 mL / NET: -300 mL            CAPILLARY BLOOD GLUCOSE      RADIOLOGY & ADDITIONAL TESTS:

## 2021-09-29 NOTE — PROGRESS NOTE ADULT - SUBJECTIVE AND OBJECTIVE BOX
C A R D I O L O G Y  **********************************     DATE OF SERVICE: 09-29-21    Patient denies chest pain or shortness of breath.   Review of symptoms otherwise negative.    ampicillin/sulbactam  IVPB 3 Gram(s) IV Intermittent every 12 hours  aspirin  chewable 81 milliGRAM(s) Oral daily  atorvastatin 80 milliGRAM(s) Oral at bedtime  bisacodyl Suppository 10 milliGRAM(s) Rectal once  cyanocobalamin 1000 MICROGram(s) Oral daily  ergocalciferol 45219 Unit(s) Oral <User Schedule>  ferrous    sulfate 325 milliGRAM(s) Oral daily  fluconAZOLE IVPB      fluconAZOLE IVPB 100 milliGRAM(s) IV Intermittent every 24 hours  heparin   Injectable 5000 Unit(s) SubCutaneous every 8 hours  influenza   Vaccine 0.5 milliLiter(s) IntraMuscular once  insulin lispro (ADMELOG) corrective regimen sliding scale   SubCutaneous Before meals and at bedtime  levoFLOXacin  Tablet 250 milliGRAM(s) Oral every 48 hours  metoprolol succinate  milliGRAM(s) Oral daily  NIFEdipine XL 60 milliGRAM(s) Oral daily  ondansetron Injectable 4 milliGRAM(s) IV Push three times a day PRN  pantoprazole  Injectable 40 milliGRAM(s) IV Push two times a day  polyethylene glycol 3350 17 Gram(s) Oral daily  senna 2 Tablet(s) Oral at bedtime  tamsulosin 0.4 milliGRAM(s) Oral at bedtime                            10.3   8.63  )-----------( 390      ( 29 Sep 2021 08:09 )             31.9       Hemoglobin: 10.3 g/dL (09-29 @ 08:09)  Hemoglobin: 9.7 g/dL (09-28 @ 07:39)  Hemoglobin: 9.6 g/dL (09-27 @ 06:17)  Hemoglobin: 8.9 g/dL (09-26 @ 07:23)  Hemoglobin: 9.5 g/dL (09-25 @ 06:40)      09-29    146<H>  |  110<H>  |  33<H>  ----------------------------<  122<H>  3.9   |  27  |  1.73<H>    Ca    9.0      29 Sep 2021 08:09  Phos  2.8     09-29  Mg     2.5     09-29    TPro  7.1  /  Alb  2.3<L>  /  TBili  0.5  /  DBili  x   /  AST  20  /  ALT  15  /  AlkPhos  101  09-28    Creatinine Trend: 1.73<--, 2.24<--, 3.19<--, 3.83<--, 4.05<--, 3.97<--    COAGS:     CARDIAC MARKERS ( 29 Sep 2021 08:09 )  1.050 ng/mL / x     / 45 U/L / x     / 1.0 ng/mL  CARDIAC MARKERS ( 28 Sep 2021 07:39 )  1.510 ng/mL / x     / 56 U/L / x     / 1.0 ng/mL  CARDIAC MARKERS ( 27 Sep 2021 19:41 )  1.220 ng/mL / x     / x     / x     / x      CARDIAC MARKERS ( 27 Sep 2021 11:04 )  1.460 ng/mL / x     / x     / x     / x            T(C): 36.7 (09-29-21 @ 11:04), Max: 36.8 (09-28-21 @ 19:49)  HR: 73 (09-29-21 @ 11:04) (73 - 85)  BP: 144/72 (09-29-21 @ 11:04) (120/64 - 144/72)  RR: 18 (09-29-21 @ 11:04) (18 - 19)  SpO2: 98% (09-29-21 @ 11:04) (92% - 98%)  Wt(kg): --    I&O's Summary    28 Sep 2021 07:01  -  29 Sep 2021 07:00  --------------------------------------------------------  IN: 0 mL / OUT: 800 mL / NET: -800 mL        HEENT:  (-)icterus (-)pallor  CV: N S1 S2 1/6 TRINIDAD (+)2 Pulses B/l  Resp:  Clear to ausculatation B/L, normal effort  GI: (+) BS Soft, NT, ND  Lymph:  (-)Edema, (-)obvious lymphadenopathy  Skin: Warm to touch, Normal turgor  Psych: Appropriate mood and affect      ASSESSMENT/PLAN: 	76y  Male PMH DM on insulin, HTN, HLD, normal lV fx moderate to severe AS 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    - keep net negative  - Resolving ATN  - Podiatry f/u noted  - Abx per primary team  - Positive trop noted, likely due to increased demand and renal failure further work up will depend on clinical course    Zak Wilkins MD, [facility]  BEEPER [phone]

## 2021-09-29 NOTE — PROGRESS NOTE ADULT - PROBLEM SELECTOR PLAN 3
Podiatry and ID following  biopsy pending  s/p wound vac change 9/27  on unasyn and levaquin per ID given OREN (was previously on zosyn)  duration pending margin on biopsy as per ID recs

## 2021-09-29 NOTE — PROGRESS NOTE ADULT - PROBLEM SELECTOR PLAN 8
CAD s/p CABG, severe aortic stenosis --> patient will need TAVR, SABIHA after infectious workup is complete  continue ASA, lipitor, BB, ACEi  EF 55-60%, GIDD   Cardio: Dr. Wilkins - recommends further cardiac ischemia workup when patient stable from OREN and infectious perspective given severe AS

## 2021-09-29 NOTE — PROGRESS NOTE ADULT - ASSESSMENT
Calculator page.   This informational tool is a resource and is not meant for direct clinical application.   Patient Search   Multi-Patient Search   MME Calculator   Reports   Drug List   Designation   My CORY #  Data Detail Level: Printer-Friendly View Extended View  Confidential Drug Utilization Report  Search Terms: alfred villagran, 1945Search Date: 09/18/2021 13:46:54 PM  The Drug Utilization Report below displays all of the controlled substance prescriptions, if any, that your patient has filled in the last twelve months. The information displayed on this report is compiled from pharmacy submissions to the Department, and accurately reflects the information as submitted by the pharmacies.    This report was requested by: Minh Slater | Reference #: [id number]    There are no results for the search terms that you entered.

## 2021-09-29 NOTE — PROGRESS NOTE ADULT - ATTENDING COMMENTS
HPI:  76M from home, lives with daughter w/ PMH DM on insulin, HTN, HLD, CAD, 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 Pt with sharp pain on the R lateral foot. As per daughter, pt was taking tylenol which did not help his pain prompting the patient to ask his daughter to take him to the hospital. Pt is a poor historian. Daughter states that the patient did not see his podiatrist after the surgery. No fever, chest, pain, palpitations, nausea, vomiting, diarrhea (17 Sep 2021 01:11)    # SUSPECT RIGHT FOOT OSTEOMYELITIS S/P RIGHT 5TH RAY RESECTION [8/19] AND S/P DEBRIDEMENT, GRAFT APPLICATION, BONE BX AND WOUND VAC APPLICATION 9/22  ** RECENT ADMISSION FOR RIGHT DIABETIC FOOT ULCER, CELLULITIS, OSTEOMYELITIS RIGHT 5th METATARSAL S/P RIGHT 5TH PARTIAL RAY AMPUTATION [8/20] - BONE PATHOLOGY W/ CLEAN MARGINS    - S/P VANCOMYCIN AND ZOSYN ; NOW ON UNASYN AND LEVAQUIN GIVEN OREN  - RECENTLY HAD SIMON W/ MILD PAD  - REVIEWED WOUND CX [ ENTEROCOCCUS, AEROMONAS, KLEBSIELLA] AND BCX   - ID CONSULT, PODIATRY CONSULT    # ACUTE HYPOXIC RESPIRATORY FAILURE SUSPECT S/T PULMONARY EDEMA IN THE SETTING OF SEVERE AORTIC STENOSIS + ASPIRATION PNA - ON LEVAQUIN, STARTED LASIX, CARDIOLOGY CONSULT IN PROGRESS  - EVALUATED BY CRITICAL CARE THIS A.M. - PATIENT HAS SINCE IMPROVED  - F/U EKG, REPEAT CXR IN A.M. [9/29]      # BOWEL DISTENTION - ? ILEUS - OPTIMIZING ELECTROLYTES, SURGERY CONSULT IN PROGRESS  - F/U LACTIC ACID  - PASSED 2 BMS ON 9/27    # ASPIRATION EVENT WHEN PATIENT REMOVED NGT - ON LEVAQUIN, ID CONSULT IN PROGRESS    # CHOLELITHIASIS - TRENDING LFTS, RUQ U/S - CHOLELITHIASIS, SURGERY CONSULT IN PROGRESS  - GI CONSULT IN PROGRESS - LESS SUSPICIOUS FOR CHOLECYSTITIS    # ? DYSPEPSIA - PLACED ON PPI BID, IMPROVED, UNDERWENT ST EVAL - TOLERATING LIQUID DIET TODAY    # ELEVATED TROPONINS - NSTEMI - ? DEMAND - WILL NEED ISCHEMIC EVAL, CARDIOLOGY CONSULT IN PROGRESS  - ON ASA, STATIN, BB    # OREN ON CKD - S/T ATN AND URINARY RETENTION - S/P IVF, NOW W/ CASTAÑEDA, NEPHROLOGY CONUSLT IN PROGRESS    # MEDICAL CLEARANCE FOR SURGERY - RCRI - 2 - 10.1 % 30 DAY RISK OF DEATH, MI OR CARDIAC ARREST  - CARDIOLOGY CONSULT     # DM -  HBA1C - 11  - LANTUS, SSI + FS    # CAD S/P CABG - PLACED ON ASA, STATIN, BB  - ECHO - SEVERE AORTIC STENOSIS, SEVERE CONCENTRIC LVH, G1DD, MILD MA  - CARDIOLOGY CONSULT - DR. BASSETT   - MAY NEED ISCHEMIC EVAL ONCE ACUTE ILLNESS RESOLVES    # HTN - ON METOPROLOL, NICARDIPINE    # SEVERE, ASYMPTOMATIC, STENOSIS - ONCE ACUTE ILLNESS RESOLVES, WILL LIKELY NEED ISCHEMIC WORKUP, SABIHA TO EVALUATE FURTHER. D/W PATIENT, DAUGHTER AND CARDIOLOGY TEAM [PREVIOUSLY SAW DR. BASSETT]    # VITAMIN D DEFICIENCY - ON CHOLECALCIFEROL    # HLD - STATIN    # CASE DISCUSSED AT LENGTH WITH PATIENT AND DAUGHTER, BIRDIE ROBERT AT BEDSIDE  # PATIENT AND DAUGHTER REFUSED [facility] ON PREVIOUS ADMISSION, PREVIOUSLY WISHED FOR PATIENT RETURN HOME W/ HOME PT; HOWEVER NOW, DAUGHTER WISHES FOR PATIENT TO GO TO [facility], AS PATIENT'S WIFE IS CURRENTLY ADMITTED THERE    # GI AND DVT PPX

## 2021-09-29 NOTE — PROGRESS NOTE ADULT - PROBLEM SELECTOR PLAN 6
OREN superimposed on Stage III CKD, improving  concern for AIN 2/2 antibiotics   Creatinine down-trending  hold off on IVF as CXR with pulmonary edema  avoid nephrotoxic agents, dose as per GFR  monitor creatinine, electrolytes  holding Lisinopril, Nifedipine incr. 90 mg daily   f/u C3/ C4; ASO negative  Urine eosinophils negative  renal ultrasound negative for hydronephrosis, hyperechoic kidneys significant for medical renal disease  UO improving, Cr mildly improving  Strict I&O, if Cr improving will perform TOV  Nephrology Dr. Rodriguez consulted

## 2021-09-29 NOTE — PROGRESS NOTE ADULT - PROBLEM SELECTOR PLAN 1
repeat CXR this AM with pulm edema  f/u EKG, troponin, proBNP, ABG  Lasix 80 mg x 1   monitor respiratory status  Cardiology consulted , Franky  Nephro consulted Dr. Rodriguez  tight I's and O's, daily weights Today -3.2 L out  ibrahim catheter for close UO monitoring

## 2021-09-29 NOTE — PROGRESS NOTE ADULT - ASSESSMENT
76M with large bowel distention  US with gallbladder sludge/stones. No signs of acute cholecystitis  LFTs wnl, Afeb, No leukocytosis  Abdomen benign     -diet as tolerated  -no acute surgical intervention warranted at this time given patient is asymthomatic  -will continue to follow  -remainder of care per primary team

## 2021-09-29 NOTE — PROGRESS NOTE ADULT - PROBLEM SELECTOR PLAN 2
patient had reflux with lactulose and likely aspirated  CXR with consolidation on RLL  already covered on Levaquin and Unasyn  monitor respiratory status  advanced diet to clear liquids  SLP recommending soft diet with thin liquids, Soft/Thin (No foods that may cause reflux)

## 2021-09-29 NOTE — PROGRESS NOTE ADULT - SUBJECTIVE AND OBJECTIVE BOX
INTERVAL HPI/OVERNIGHT EVENTS:  Pt seen and examined, no acute complaints  denies nausea/vomiting  +flatus/BM     MEDICATIONS  (STANDING):  ampicillin/sulbactam  IVPB 3 Gram(s) IV Intermittent every 12 hours  aspirin  chewable 81 milliGRAM(s) Oral daily  atorvastatin 80 milliGRAM(s) Oral at bedtime  bisacodyl Suppository 10 milliGRAM(s) Rectal once  cyanocobalamin 1000 MICROGram(s) Oral daily  ergocalciferol 71669 Unit(s) Oral <User Schedule>  ferrous    sulfate 325 milliGRAM(s) Oral daily  fluconAZOLE IVPB      fluconAZOLE IVPB 100 milliGRAM(s) IV Intermittent every 24 hours  heparin   Injectable 5000 Unit(s) SubCutaneous every 8 hours  influenza   Vaccine 0.5 milliLiter(s) IntraMuscular once  insulin lispro (ADMELOG) corrective regimen sliding scale   SubCutaneous Before meals and at bedtime  levoFLOXacin  Tablet 250 milliGRAM(s) Oral every 48 hours  metoprolol succinate  milliGRAM(s) Oral daily  NIFEdipine XL 60 milliGRAM(s) Oral daily  pantoprazole  Injectable 40 milliGRAM(s) IV Push two times a day  polyethylene glycol 3350 17 Gram(s) Oral daily  senna 2 Tablet(s) Oral at bedtime  tamsulosin 0.4 milliGRAM(s) Oral at bedtime    MEDICATIONS  (PRN):  ondansetron Injectable 4 milliGRAM(s) IV Push three times a day PRN Nausea and/or Vomiting      Vital Signs Last 24 Hrs  T(C): 36.3 (29 Sep 2021 07:28), Max: 36.8 (28 Sep 2021 19:49)  T(F): 97.4 (29 Sep 2021 07:28), Max: 98.2 (28 Sep 2021 19:49)  HR: 77 (29 Sep 2021 07:28) (77 - 85)  BP: 123/58 (29 Sep 2021 07:28) (110/66 - 128/59)  RR: 18 (29 Sep 2021 07:28) (18 - 19)  SpO2: 97% (29 Sep 2021 07:28) (92% - 98%)    Physical:  General: A&Ox3. NAD  Chest: respiration unlabored   Abdomen: Soft nondistended, nontender. No guarding     I&O's Detail  28 Sep 2021 07:01  -  29 Sep 2021 07:00  --------------------------------------------------------  IN:  Total IN: 0 mL    OUT:    Indwelling Catheter - Urethral (mL): 800 mL  Total OUT: 800 mL    Total NET: -800 mL      LABS:                        9.7    6.89  )-----------( 314      ( 28 Sep 2021 07:39 )             29.7             09-28    144  |  109<H>  |  39<H>  ----------------------------<  124<H>  3.7   |  27  |  2.24<H>    Ca    8.7      28 Sep 2021 07:39  Phos  3.0     09-28  Mg     2.3     09-28    TPro  7.1  /  Alb  2.3<L>  /  TBili  0.5  /  DBili  x   /  AST  20  /  ALT  15  /  AlkPhos  101  09-28

## 2021-09-29 NOTE — PROGRESS NOTE ADULT - ATTENDING COMMENTS
Seen at bedside. Surgical site cleaned and examined.  Graft incorporating.  Continue with VAC-reapplied today.

## 2021-09-29 NOTE — SWALLOW BEDSIDE ASSESSMENT ADULT - PHARYNGEAL PHASE
2/2 reflux/Cough post oral intake 2/2 reflux/Cough post oral intake/Multiple swallows Within functional limits Multiple swallows

## 2021-09-29 NOTE — SWALLOW BEDSIDE ASSESSMENT ADULT - ORAL PREPARATORY PHASE
Within functional limits Reduced oral grading Reduced oral grading/Decreased mastication ability Decreased mastication ability

## 2021-09-29 NOTE — PROGRESS NOTE ADULT - PROBLEM SELECTOR PLAN 1
Pt with right foot pain due to s/p 5th toe wound debridement and wound vac application on 9/22, POD #7. Being treated for OM. Pt with OREN, improved from Cr 4.05 to 1.73. Being treated for OM. Remains NPO. Pt self d/c'd NGT 9/26, with episode of hypoxia, now requiring non-rebreather. Attempts to replace NGT unsuccessful. ICU consulted. CTAP negative for bowel obstruction on 9/27   High risk medications reviewed. Avoid polypharmacy. Avoid NSAIDs and benzodiazepines. Non-pharmacological sleep aides initiated. Non-opioid medications and non-pharmacological pain management measures initiated.   nonopioid recc  - acetaminophen 1 gram IV q 8 hours x 24hr only completed.   - Discontinued gabapentin 100mg po q 12 hours on 9/24 (pt with worsening OREN)  - no nsaids - pt with oren  opioid recc  - Opioids d/c'd 9/27 due to concern for bowel obstruction ruled out on CTAP.  Last BM 9/27.  bowel regimen  - per primary team  Pain team will sign off. Please reconsult if needed.

## 2021-09-29 NOTE — PROGRESS NOTE ADULT - ASSESSMENT
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 s/p OR debridement today. Nephrology consulted for abrupt rise in SCr.     1. OREN- likely ATN in the setting of infection and ACEi use. Lisinopril discontinued 9/22. Renal function plateaued and now recovering.  s/p Lasix 80mg IV  on 9/27 for pulmonary edema/ SOB.   Kidney/ Bladder US with large distended bladder s/p ibrahim placement on 9/23; renal function did not improve post ibrahim; less likely due to obstructive uropathy. UA with 30 protein, trace blood with small LE, Check UCX to r/o UTI.   FeNa 0.72% and FeUrea 19.39%; consistent with pre-renal OREN. However renal function worsening on IVF. No peripheral eosinophilia- no signs of AIN. C3 & C4 wnl with neg ASO- no signs of PIGN. Strict I/Os. Avoid nephrotoxins/ NSAIDs/ RCA. Monitor BMP.  2. 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 Will defer secondary w/u as an outpt. Avoid nephrotoxins  3. HTN 2/2 CKD- BP acceptable. Lisinopril d/c due to OREN. c/w Nifedipine ER 60mg PO qd. Monitor BP  4. Acute osteomyelitis- s/p debridement 9/22. Pt on Unasyn/ Levaquin renally dosed. Plan as per ID      [facility] NEPHROLOGY  Bryn Johnson M.D.  Domingo Booht D.O.  Zakia Rodriguez M.D.  Zonia Adams, REJI, ANP-C  [phone]    [address] [place] [address]

## 2021-09-29 NOTE — PROGRESS NOTE ADULT - ASSESSMENT
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  As per daughter, patient was taking tylenol which did not help his pain prompting the patient to ask his daughter to take him to the hospital. ON admission, he has no fever or Leukocytosis. The Xray of Right foot shows no Osteomyelitis but MRI of Right foot shows Lateral soft tissue wound with marrow signal abnormality throughout the fifth metatarsal which is consistent of Osteomyelitis. He has seen by Podiatry and wound culture has sent, Zosyn and Vancomycin has started. The ID consult requested to assist with further evaluation and antibiotic management.     # Right Fifth metatarsal DFU with drainage and Osteomyelitis- wound cx grew Enterococcus, Aeromonas and Klebsiella - Zosyn is the ideal to cover All organisms but kidney function is worsening, hence change to Unasyn and Levaquin until kidney function is improved - The pathology shows Bone with acute osteomyelitis and necrotic periosteal tissue.   # OREN- s/p urinary retention -s/p ibrahim catheter - s/p discontinue ACEI  # RLL pneumonia- most likely Aspiration, S/p Vomiting - On Unasyn  # Large bowel distension  # Candiduria- 9/22/21    would recommend:    1. Wean off oxygen as tolerated   2. Monitor  kidney function, is improving  3. Adjusted doses of Levaquin and  Unasyn as per crcl  4. Wound care as per Podiatry  5. Taper off pain medications since become confused  6. Aspiration precaution  7. Need at least 6 weeks of IV Abx to cover Osteomyelitis    d/w Dr. Casiano and the team     Attending Attestation:    Spent more than 35 minutes on total encounter, more than 50 % of the visit was spent counseling and/or coordinating care by the Attending physician. 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  As per daughter, patient was taking tylenol which did not help his pain prompting the patient to ask his daughter to take him to the hospital. ON admission, he has no fever or Leukocytosis. The Xray of Right foot shows no Osteomyelitis but MRI of Right foot shows Lateral soft tissue wound with marrow signal abnormality throughout the fifth metatarsal which is consistent of Osteomyelitis. He has seen by Podiatry and wound culture has sent, Zosyn and Vancomycin has started. The ID consult requested to assist with further evaluation and antibiotic management.     # Right Fifth metatarsal DFU with drainage and Osteomyelitis- wound cx grew Enterococcus, Aeromonas and Klebsiella - Zosyn is the ideal to cover All organisms but kidney function is worsening, hence change to Unasyn and Levaquin until kidney function is improved - The pathology shows Bone with acute osteomyelitis and necrotic periosteal tissue.   # OREN- s/p urinary retention -s/p ibrahim catheter - s/p discontinue ACEI  # RLL pneumonia- most likely Aspiration, S/p Vomiting - On Unasyn  # Large bowel distension  # Candiduria- 9/22/21    would recommend:    1. Wean off oxygen as tolerated   2. Monitor  kidney function, is improving  3. Adjusted doses of Levaquin and  Unasyn as per crcl  4. Wound care as per Podiatry  5. Taper off pain medications since become confused  6. Aspiration precaution  7. Need at least 6 weeks of IV Abx to cover Osteomyelitis    d/w Dr. Casiano and The team    Attending Attestation:    Spent more than 35 minutes on total encounter, more than 50 % of the visit was spent counseling and/or coordinating care by the Attending physician.

## 2021-09-29 NOTE — PROGRESS NOTE ADULT - SUBJECTIVE AND OBJECTIVE BOX
Podiatry Interval HPI: Pt seen bedside resting comfortably. Pt denies any overnight acute events. Denies pain to R foot. States wound vac  last night. Denies constitutional symptoms No other pedal complaints.      Podiatry HPI: 76 year old male with a PMHx of DM, HTN, HLD, CAD to ED presents to the ED for a Right Foot s/p 5th foot partial ray resection and fillet toe flap. Patient states that he has sharp localized non-radiating pain to the Right foot 5th metatarsal. States that after his surgery, he did not see a podiatrist and he came into the ED because he saw drainage and was having pain. Denies any constitutional symptoms of N/V/C/F/SOB. Denies any other pedal complaints at this time. Denies calf pain today, bilaterally.    Medications ampicillin/sulbactam  IVPB 3 Gram(s) IV Intermittent every 12 hours  aspirin  chewable 81 milliGRAM(s) Oral daily  atorvastatin 80 milliGRAM(s) Oral at bedtime  bisacodyl Suppository 10 milliGRAM(s) Rectal once  cyanocobalamin 1000 MICROGram(s) Oral daily  ergocalciferol 05787 Unit(s) Oral <User Schedule>  ferrous    sulfate 325 milliGRAM(s) Oral daily  fluconAZOLE IVPB      fluconAZOLE IVPB 100 milliGRAM(s) IV Intermittent every 24 hours  heparin   Injectable 5000 Unit(s) SubCutaneous every 8 hours  influenza   Vaccine 0.5 milliLiter(s) IntraMuscular once  insulin lispro (ADMELOG) corrective regimen sliding scale   SubCutaneous Before meals and at bedtime  levoFLOXacin  Tablet 250 milliGRAM(s) Oral every 48 hours  metoprolol succinate  milliGRAM(s) Oral daily  NIFEdipine XL 60 milliGRAM(s) Oral daily  ondansetron Injectable 4 milliGRAM(s) IV Push three times a day PRN  pantoprazole  Injectable 40 milliGRAM(s) IV Push two times a day  polyethylene glycol 3350 17 Gram(s) Oral daily  senna 2 Tablet(s) Oral at bedtime  tamsulosin 0.4 milliGRAM(s) Oral at bedtime    FHFamily history of acute myocardial infarction (Father)    Family history of diabetes mellitus (Mother, Sibling)    Family history of hypertension (Mother, Sibling)    Family history of hyperlipidemia (Mother, Sibling)    ,   PMHHTN (hypertension)    HLD (hyperlipidemia)    DM (diabetes mellitus)    CAD (coronary artery disease)    Glaucoma, angle-closure    [place] (hard of hearing)       PSHNo significant past surgical history    S/P CABG (coronary artery bypass graft)    History of thyroid surgery        Labs                          10.3   8.63  )-----------( 390      ( 29 Sep 2021 08:09 )             31.9          146<H>  |  110<H>  |  33<H>  ----------------------------<  122<H>  3.9   |  27  |  1.73<H>    Ca    9.0      29 Sep 2021 08:09  Phos  2.8       Mg     2.5         TPro  7.1  /  Alb  2.3<L>  /  TBili  0.5  /  DBili  x   /  AST  20  /  ALT  15  /  AlkPhos  101       Vital Signs Last 24 Hrs  T(C): 36.7 (29 Sep 2021 11:04), Max: 36.8 (28 Sep 2021 19:49)  T(F): 98.1 (29 Sep 2021 11:04), Max: 98.2 (28 Sep 2021 19:49)  HR: 73 (29 Sep 2021 11:04) (73 - 85)  BP: 144/72 (29 Sep 2021 11:04) (120/64 - 144/72)  BP(mean): --  RR: 18 (29 Sep 2021 11:04) (18 - 19)  SpO2: 98% (29 Sep 2021 11:04) (92% - 98%)  Sedimentation Rate, Erythrocyte: 77 mm/Hr (21 @ 16:03)  Sedimentation Rate, Erythrocyte: 91 mm/Hr (21 @ 15:41)         C-Reactive Protein, Serum: 45 mg/L (21 @ 23:33)  C-Reactive Protein, Serum: 85 mg/L (21 @ 21:30)  C-Reactive Protein, Serum: 67 mg/L (08-15-21 @ 11:55)   WBC Count: 8.63 K/uL (21 @ 08:09)      PHYSICAL EXAM (Unable to conduct a thorough physical examination today, physical examination per last time)  LE Focused:    Vasc:  DP/PT pulses were faintly palpable, bilateral. DP/PT pulses were monophasic on doppler, bilaterally. CFT<3 seconds to all digits.   Derm: Surgical site with graft in place to R 5th ray, granular wound bed through the graft, minimal drainage or discharge. minimal erythema and edema noted, eschar forming over the wound. Dorsal foot wound noted- mild erythema and edema noted. DTI noted to b/l heel  Neuro: Protective sensation grossly diminished, b/l  MSK: 5/5 muscle strength noted to the pedal compartments. 5th digit amputation R foot    Imaging:  INTERPRETATION:  Postop, rule out infection.    3 views right foot. Prior 2021.     Status post resection of the fifth toe and distal fifth metatarsal unchanged in appearance. No focal bone lysis or unusual periosteal reaction to suggest osteomyelitis. No acute fracture or dislocation. The remainder study unchanged. No soft tissue gas.    IMPRESSION: No acute finding. No plain film evidence of osteomyelitis.        MRI R foot:     INTERPRETATION:  Clinical Information: Recent fifth metatarsal head resection now with fifth digit pain, swelling and foul discharge.    Comparison: Radiographs of the right foot from 2021 and MRI the right foot from 2021.    Technique:  MRI of the right midfoot and forefoot.  Intravenous Contrast: None.    Findings:    There is a resection at the mid aspect of the fifth metatarsal shaft. There is a lateral soft tissue wound beginning at the level of the amputation which extends distally. There is susceptibility artifact in the region of the amputation consistent with postoperative change. There is hyperintense T2 marrow signal throughout the remaining fifth metatarsal and within the adjacent fourth metatarsal head which is nonspecific and could be related to recent postoperative changes although osteomyelitis is suspected.    There is edema and mild atrophy within the plantar muscles of the foot. There is minimal spurring at the first metatarsophalangeal and hallux sesamoid articulations.    Impression:  Resection at the mid aspect of the fifth metatarsal. Lateral soft tissue wound with marrow signal abnormality throughout the fifth metatarsal and within the adjacent fourth metatarsal head which is nonspecific in the setting of recent surgery although osteomyelitis is suspected.      Culture Results:   Rare Aeromonas hydrophila/caviae   Few Klebsiella oxytoca/Raoutella ornithinolytica   Few Enterococcus faecalis   Few Streptococcus agalactiae (Group B) isolated   Group B streptococci are susceptible to ampicillin,   penicillin and cefazolin, but may be resistant to   erythromycin and clindamycin.   Recommendations for intrapartum prophylaxis for Group B   streptococci are penicillin or ampicillin. (21 @ 21:42)     Gram Stain:   No polymorphonuclear cells seen per low power field   No organisms seen per oil power field (21 @ 13:54)       Historical Values  Gram Stain:   No polymorphonuclear cells seen per low power field   No organisms seen per oil power field (21 @ 13:54)   Gram Stain:   No polymorphonuclear leukocytes seen per low power field   No organisms seen per oil power field (21 @ 03:59)        Podiatry Interval HPI: Pt seen bedside resting comfortably. Pt denies any overnight acute events. Denies pain to R foot. Denies constitutional symptoms No other pedal complaints.      Podiatry HPI: 76 year old male with a PMHx of DM, HTN, HLD, CAD to ED presents to the ED for a Right Foot s/p 5th foot partial ray resection and fillet toe flap. Patient states that he has sharp localized non-radiating pain to the Right foot 5th metatarsal. States that after his surgery, he did not see a podiatrist and he came into the ED because he saw drainage and was having pain. Denies any constitutional symptoms of N/V/C/F/SOB. Denies any other pedal complaints at this time. Denies calf pain today, bilaterally.    Medications ampicillin/sulbactam  IVPB 3 Gram(s) IV Intermittent every 12 hours  aspirin  chewable 81 milliGRAM(s) Oral daily  atorvastatin 80 milliGRAM(s) Oral at bedtime  bisacodyl Suppository 10 milliGRAM(s) Rectal once  cyanocobalamin 1000 MICROGram(s) Oral daily  ergocalciferol 57175 Unit(s) Oral <User Schedule>  ferrous    sulfate 325 milliGRAM(s) Oral daily  fluconAZOLE IVPB      fluconAZOLE IVPB 100 milliGRAM(s) IV Intermittent every 24 hours  heparin   Injectable 5000 Unit(s) SubCutaneous every 8 hours  influenza   Vaccine 0.5 milliLiter(s) IntraMuscular once  insulin lispro (ADMELOG) corrective regimen sliding scale   SubCutaneous Before meals and at bedtime  levoFLOXacin  Tablet 250 milliGRAM(s) Oral every 48 hours  metoprolol succinate  milliGRAM(s) Oral daily  NIFEdipine XL 60 milliGRAM(s) Oral daily  ondansetron Injectable 4 milliGRAM(s) IV Push three times a day PRN  pantoprazole  Injectable 40 milliGRAM(s) IV Push two times a day  polyethylene glycol 3350 17 Gram(s) Oral daily  senna 2 Tablet(s) Oral at bedtime  tamsulosin 0.4 milliGRAM(s) Oral at bedtime    FHFamily history of acute myocardial infarction (Father)    Family history of diabetes mellitus (Mother, Sibling)    Family history of hypertension (Mother, Sibling)    Family history of hyperlipidemia (Mother, Sibling)    ,   PMHHTN (hypertension)    HLD (hyperlipidemia)    DM (diabetes mellitus)    CAD (coronary artery disease)    Glaucoma, angle-closure    [place] (hard of hearing)       PSHNo significant past surgical history    S/P CABG (coronary artery bypass graft)    History of thyroid surgery        Labs                          10.3   8.63  )-----------( 390      ( 29 Sep 2021 08:09 )             31.9      09-29    146<H>  |  110<H>  |  33<H>  ----------------------------<  122<H>  3.9   |  27  |  1.73<H>    Ca    9.0      29 Sep 2021 08:09  Phos  2.8     09-29  Mg     2.5     09-29    TPro  7.1  /  Alb  2.3<L>  /  TBili  0.5  /  DBili  x   /  AST  20  /  ALT  15  /  AlkPhos  101  09-28     Vital Signs Last 24 Hrs  T(C): 36.7 (29 Sep 2021 11:04), Max: 36.8 (28 Sep 2021 19:49)  T(F): 98.1 (29 Sep 2021 11:04), Max: 98.2 (28 Sep 2021 19:49)  HR: 73 (29 Sep 2021 11:04) (73 - 85)  BP: 144/72 (29 Sep 2021 11:04) (120/64 - 144/72)  BP(mean): --  RR: 18 (29 Sep 2021 11:04) (18 - 19)  SpO2: 98% (29 Sep 2021 11:04) (92% - 98%)  Sedimentation Rate, Erythrocyte: 77 mm/Hr (09-16-21 @ 16:03)  Sedimentation Rate, Erythrocyte: 91 mm/Hr (08-22-21 @ 15:41)         C-Reactive Protein, Serum: 45 mg/L (09-16-21 @ 23:33)  C-Reactive Protein, Serum: 85 mg/L (08-22-21 @ 21:30)  C-Reactive Protein, Serum: 67 mg/L (08-15-21 @ 11:55)   WBC Count: 8.63 K/uL (09-29-21 @ 08:09)      PHYSICAL EXAM (Unable to conduct a thorough physical examination today, physical examination per last time)  LE Focused:    Vasc:  DP/PT pulses were faintly palpable, bilateral. DP/PT pulses were monophasic on doppler, bilaterally. CFT<3 seconds to all digits.   Derm: Surgical site with graft in place to R 5th ray, granular wound bed through the graft, minimal drainage or discharge. minimal erythema and edema noted, eschar forming over the wound. Dorsal foot wound noted- mild erythema and edema noted. DTI noted to b/l heel  Neuro: Protective sensation grossly diminished, b/l  MSK: 5/5 muscle strength noted to the pedal compartments. 5th digit amputation R foot    Imaging:  INTERPRETATION:  Postop, rule out infection.    3 views right foot. Prior 8/20/2021.     Status post resection of the fifth toe and distal fifth metatarsal unchanged in appearance. No focal bone lysis or unusual periosteal reaction to suggest osteomyelitis. No acute fracture or dislocation. The remainder study unchanged. No soft tissue gas.    IMPRESSION: No acute finding. No plain film evidence of osteomyelitis.        MRI R foot:     INTERPRETATION:  Clinical Information: Recent fifth metatarsal head resection now with fifth digit pain, swelling and foul discharge.    Comparison: Radiographs of the right foot from 9/16/2021 and MRI the right foot from 8/16/2021.    Technique:  MRI of the right midfoot and forefoot.  Intravenous Contrast: None.    Findings:    There is a resection at the mid aspect of the fifth metatarsal shaft. There is a lateral soft tissue wound beginning at the level of the amputation which extends distally. There is susceptibility artifact in the region of the amputation consistent with postoperative change. There is hyperintense T2 marrow signal throughout the remaining fifth metatarsal and within the adjacent fourth metatarsal head which is nonspecific and could be related to recent postoperative changes although osteomyelitis is suspected.    There is edema and mild atrophy within the plantar muscles of the foot. There is minimal spurring at the first metatarsophalangeal and hallux sesamoid articulations.    Impression:  Resection at the mid aspect of the fifth metatarsal. Lateral soft tissue wound with marrow signal abnormality throughout the fifth metatarsal and within the adjacent fourth metatarsal head which is nonspecific in the setting of recent surgery although osteomyelitis is suspected.      Culture Results:   Rare Aeromonas hydrophila/caviae   Few Klebsiella oxytoca/Raoutella ornithinolytica   Few Enterococcus faecalis   Few Streptococcus agalactiae (Group B) isolated   Group B streptococci are susceptible to ampicillin,   penicillin and cefazolin, but may be resistant to   erythromycin and clindamycin.   Recommendations for intrapartum prophylaxis for Group B   streptococci are penicillin or ampicillin. (09.16.21 @ 21:42)     Gram Stain:   No polymorphonuclear cells seen per low power field   No organisms seen per oil power field (09.22.21 @ 13:54)       Historical Values  Gram Stain:   No polymorphonuclear cells seen per low power field   No organisms seen per oil power field (09.22.21 @ 13:54)   Gram Stain:   No polymorphonuclear leukocytes seen per low power field   No organisms seen per oil power field (08.20.21 @ 03:59)

## 2021-09-29 NOTE — SWALLOW BEDSIDE ASSESSMENT ADULT - CONSISTENCIES ADMINISTERED
Applesauce x 3 teaspoons/puree Applesauce w/ bre cracker x 2 teaspoons/mech soft ice chips x2 water x full cup/thin liquid

## 2021-09-29 NOTE — PROGRESS NOTE ADULT - PROBLEM SELECTOR PLAN 4
AXR with colonic distention, hyperactive bowel sounds  Had 2 bowel movements last night  CT A/P shows distended gallbladder  RUQ US with no acute cholecystitis  Surgery recommending no intervention at this time, GI consulted Dr. Vick

## 2021-09-30 LAB
ANION GAP SERPL CALC-SCNC: 8 MMOL/L — SIGNIFICANT CHANGE UP (ref 5–17)
BUN SERPL-MCNC: 30 MG/DL — HIGH (ref 7–18)
CALCIUM SERPL-MCNC: 8.7 MG/DL — SIGNIFICANT CHANGE UP (ref 8.4–10.5)
CHLORIDE SERPL-SCNC: 109 MMOL/L — HIGH (ref 96–108)
CO2 SERPL-SCNC: 28 MMOL/L — SIGNIFICANT CHANGE UP (ref 22–31)
CREAT SERPL-MCNC: 1.66 MG/DL — HIGH (ref 0.5–1.3)
GLUCOSE BLDC GLUCOMTR-MCNC: 121 MG/DL — HIGH (ref 70–99)
GLUCOSE BLDC GLUCOMTR-MCNC: 138 MG/DL — HIGH (ref 70–99)
GLUCOSE BLDC GLUCOMTR-MCNC: 172 MG/DL — HIGH (ref 70–99)
GLUCOSE BLDC GLUCOMTR-MCNC: 193 MG/DL — HIGH (ref 70–99)
GLUCOSE SERPL-MCNC: 117 MG/DL — HIGH (ref 70–99)
HCT VFR BLD CALC: 30.9 % — LOW (ref 39–50)
HGB BLD-MCNC: 10.1 G/DL — LOW (ref 13–17)
MAGNESIUM SERPL-MCNC: 2.6 MG/DL — SIGNIFICANT CHANGE UP (ref 1.6–2.6)
MCHC RBC-ENTMCNC: 29.7 PG — SIGNIFICANT CHANGE UP (ref 27–34)
MCHC RBC-ENTMCNC: 32.7 GM/DL — SIGNIFICANT CHANGE UP (ref 32–36)
MCV RBC AUTO: 90.9 FL — SIGNIFICANT CHANGE UP (ref 80–100)
NRBC # BLD: 0 /100 WBCS — SIGNIFICANT CHANGE UP (ref 0–0)
PHOSPHATE SERPL-MCNC: 3 MG/DL — SIGNIFICANT CHANGE UP (ref 2.5–4.5)
PLATELET # BLD AUTO: 376 K/UL — SIGNIFICANT CHANGE UP (ref 150–400)
POTASSIUM SERPL-MCNC: 4 MMOL/L — SIGNIFICANT CHANGE UP (ref 3.5–5.3)
POTASSIUM SERPL-SCNC: 4 MMOL/L — SIGNIFICANT CHANGE UP (ref 3.5–5.3)
RBC # BLD: 3.4 M/UL — LOW (ref 4.2–5.8)
RBC # FLD: 14.3 % — SIGNIFICANT CHANGE UP (ref 10.3–14.5)
SODIUM SERPL-SCNC: 145 MMOL/L — SIGNIFICANT CHANGE UP (ref 135–145)
WBC # BLD: 7.75 K/UL — SIGNIFICANT CHANGE UP (ref 3.8–10.5)
WBC # FLD AUTO: 7.75 K/UL — SIGNIFICANT CHANGE UP (ref 3.8–10.5)

## 2021-09-30 PROCEDURE — 99232 SBSQ HOSP IP/OBS MODERATE 35: CPT

## 2021-09-30 RX ORDER — FINASTERIDE 5 MG/1
5 TABLET, FILM COATED ORAL DAILY
Refills: 0 | Status: DISCONTINUED | OUTPATIENT
Start: 2021-09-30 | End: 2021-10-11

## 2021-09-30 RX ORDER — FUROSEMIDE 40 MG
40 TABLET ORAL ONCE
Refills: 0 | Status: COMPLETED | OUTPATIENT
Start: 2021-09-30 | End: 2021-09-30

## 2021-09-30 RX ORDER — AMPICILLIN SODIUM AND SULBACTAM SODIUM 250; 125 MG/ML; MG/ML
3 INJECTION, POWDER, FOR SUSPENSION INTRAMUSCULAR; INTRAVENOUS EVERY 6 HOURS
Refills: 0 | Status: DISCONTINUED | OUTPATIENT
Start: 2021-09-30 | End: 2021-10-08

## 2021-09-30 RX ADMIN — Medication 81 MILLIGRAM(S): at 11:33

## 2021-09-30 RX ADMIN — AMPICILLIN SODIUM AND SULBACTAM SODIUM 200 GRAM(S): 250; 125 INJECTION, POWDER, FOR SUSPENSION INTRAMUSCULAR; INTRAVENOUS at 06:32

## 2021-09-30 RX ADMIN — AMPICILLIN SODIUM AND SULBACTAM SODIUM 200 GRAM(S): 250; 125 INJECTION, POWDER, FOR SUSPENSION INTRAMUSCULAR; INTRAVENOUS at 23:36

## 2021-09-30 RX ADMIN — ATORVASTATIN CALCIUM 80 MILLIGRAM(S): 80 TABLET, FILM COATED ORAL at 23:03

## 2021-09-30 RX ADMIN — PANTOPRAZOLE SODIUM 40 MILLIGRAM(S): 20 TABLET, DELAYED RELEASE ORAL at 17:05

## 2021-09-30 RX ADMIN — HEPARIN SODIUM 5000 UNIT(S): 5000 INJECTION INTRAVENOUS; SUBCUTANEOUS at 06:33

## 2021-09-30 RX ADMIN — Medication 60 MILLIGRAM(S): at 06:31

## 2021-09-30 RX ADMIN — SENNA PLUS 2 TABLET(S): 8.6 TABLET ORAL at 22:08

## 2021-09-30 RX ADMIN — Medication 1: at 11:33

## 2021-09-30 RX ADMIN — Medication 325 MILLIGRAM(S): at 11:33

## 2021-09-30 RX ADMIN — FLUCONAZOLE 100 MILLIGRAM(S): 150 TABLET ORAL at 21:09

## 2021-09-30 RX ADMIN — ONDANSETRON 4 MILLIGRAM(S): 8 TABLET, FILM COATED ORAL at 08:34

## 2021-09-30 RX ADMIN — HEPARIN SODIUM 5000 UNIT(S): 5000 INJECTION INTRAVENOUS; SUBCUTANEOUS at 14:12

## 2021-09-30 RX ADMIN — Medication 100 MILLIGRAM(S): at 06:32

## 2021-09-30 RX ADMIN — PREGABALIN 1000 MICROGRAM(S): 225 CAPSULE ORAL at 11:33

## 2021-09-30 RX ADMIN — PANTOPRAZOLE SODIUM 40 MILLIGRAM(S): 20 TABLET, DELAYED RELEASE ORAL at 06:32

## 2021-09-30 RX ADMIN — POLYETHYLENE GLYCOL 3350 17 GRAM(S): 17 POWDER, FOR SOLUTION ORAL at 11:34

## 2021-09-30 RX ADMIN — AMPICILLIN SODIUM AND SULBACTAM SODIUM 200 GRAM(S): 250; 125 INJECTION, POWDER, FOR SUSPENSION INTRAMUSCULAR; INTRAVENOUS at 17:05

## 2021-09-30 RX ADMIN — Medication 1: at 17:08

## 2021-09-30 RX ADMIN — Medication 40 MILLIGRAM(S): at 15:16

## 2021-09-30 RX ADMIN — HEPARIN SODIUM 5000 UNIT(S): 5000 INJECTION INTRAVENOUS; SUBCUTANEOUS at 22:08

## 2021-09-30 RX ADMIN — TAMSULOSIN HYDROCHLORIDE 0.4 MILLIGRAM(S): 0.4 CAPSULE ORAL at 22:08

## 2021-09-30 NOTE — PROGRESS NOTE ADULT - PROBLEM SELECTOR PLAN 3
Podiatry and ID following  biopsy pending  s/p wound vac change 9/29  on unasyn and levaquin per ID given OREN (was previously on zosyn)  duration pending margin on biopsy as per ID recs Podiatry and ID following  biopsy with OM  s/p wound vac change 9/29  on unasyn and levaquin per ID given OREN (was previously on zosyn)-  renally dosed per [facility]  will need 6 weeks of Abx per ID, can start zosyn once OREN fully resolves

## 2021-09-30 NOTE — PROGRESS NOTE ADULT - NSPROGADDITIONALINFOA_GEN_ALL_CORE
abdomen soft, non tender  US abdomen - gallstones with no evidence of cholecystitis
can f/u in the office
pt seen and agree with above
Recommend ct abd/ pelvis

## 2021-09-30 NOTE — PROGRESS NOTE ADULT - SUBJECTIVE AND OBJECTIVE BOX
Patient is seen and examined at the bed side, is afebrile.  The kidney function continue to improve.       REVIEW OF SYSTEMS: All other review systems are negative      ALLERGIES: No Known Allergies      Vital Signs Last 24 Hrs  T(C): 36.4 (30 Sep 2021 11:02), Max: 37 (29 Sep 2021 19:47)  T(F): 97.6 (30 Sep 2021 11:02), Max: 98.6 (29 Sep 2021 19:47)  HR: 74 (30 Sep 2021 11:02) (72 - 79)  BP: 125/57 (30 Sep 2021 11:02) (123/52 - 129/59)  BP(mean): --  RR: 19 (30 Sep 2021 11:02) (18 - 19)  SpO2: 94% (30 Sep 2021 11:02) (92% - 98%)      PHYSICAL EXAM:  GENERAL: Not in distress, on oxygen via NC  CHEST/LUNG:  Not using accessory muscles  HEART: s1 and s2 present  ABDOMEN:  Mild distended   EXTREMITIES: Right foot bandage in placed   CNS: Awake, Alert  and confused       LABS:                        10.1   7.75  )-----------( 376      ( 30 Sep 2021 06:37 )             30.9                                    10.3   8.63  )-----------( 390      ( 29 Sep 2021 08:09 )             31.9       09-30    145  |  109<H>  |  30<H>  ----------------------------<  117<H>  4.0   |  28  |  1.66<H>    Ca    8.7      30 Sep 2021 06:37  Phos  3.0     09-30  Mg     2.6     09-30      09-29    146<H>  |  110<H>  |  33<H>  ----------------------------<  122<H>  3.9   |  27  |  1.73<H>    Ca    9.0      29 Sep 2021 08:09  Phos  2.8     09-29  Mg     2.5     09-29    TPro  7.1  /  Alb  2.3<L>  /  TBili  0.5  /  DBili  x   /  AST  20  /  ALT  15  /  AlkPhos  101  09-28    09-28    144  |  109<H>  |  39<H>  ----------------------------<  124<H>  3.7   |  27  |  2.24<H>    Ca    8.7      28 Sep 2021 07:39  Phos  3.0     09-28  Mg     2.3     09-28    TPro  7.1  /  Alb  2.3<L>  /  TBili  0.5  /  DBili  x   /  AST  20  /  ALT  15  /  AlkPhos  101  09-28 09-25    139  |  107  |  41<H>  ----------------------------<  134<H>  4.1   |  21<L>  |  4.05<H>    Ca    8.6      25 Sep 2021 06:40    TPro  6.6  /  Alb  2.3<L>  /  TBili  0.5  /  DBili  x   /  AST  25  /  ALT  15  /  AlkPhos  102  09-25        CAPILLARY BLOOD GLUCOSE  POCT Blood Glucose.: 134 mg/dL (17 Sep 2021 17:11)  POCT Blood Glucose.: 128 mg/dL (17 Sep 2021 11:06)  POCT Blood Glucose.: 157 mg/dL (17 Sep 2021 04:10)      MEDICATIONS  (STANDING):    ampicillin/sulbactam  IVPB 3 Gram(s) IV Intermittent every 6 hours  aspirin  chewable 81 milliGRAM(s) Oral daily  atorvastatin 80 milliGRAM(s) Oral at bedtime  bisacodyl Suppository 10 milliGRAM(s) Rectal once  cyanocobalamin 1000 MICROGram(s) Oral daily  ergocalciferol 57178 Unit(s) Oral <User Schedule>  ferrous    sulfate 325 milliGRAM(s) Oral daily  finasteride 5 milliGRAM(s) Oral daily  fluconAZOLE IVPB      fluconAZOLE IVPB 100 milliGRAM(s) IV Intermittent every 24 hours  heparin   Injectable 5000 Unit(s) SubCutaneous every 8 hours  influenza   Vaccine 0.5 milliLiter(s) IntraMuscular once  insulin lispro (ADMELOG) corrective regimen sliding scale   SubCutaneous Before meals and at bedtime  levoFLOXacin  Tablet 250 milliGRAM(s) Oral every 24 hours  metoprolol succinate  milliGRAM(s) Oral daily  NIFEdipine XL 60 milliGRAM(s) Oral daily  pantoprazole  Injectable 40 milliGRAM(s) IV Push two times a day  polyethylene glycol 3350 17 Gram(s) Oral daily  senna 2 Tablet(s) Oral at bedtime  tamsulosin 0.4 milliGRAM(s) Oral at bedtime      RADIOLOGY & ADDITIONAL TESTS:    Collected Date/Time: 9/22/2021 08:42 EDT   Received Date/Time: 9/23/2021 09:29 EDT   Surgical Pathology Report - Auth (Verified)   Specimen(s) Submitted   1 Right 5th Toe Wound Debridement r/o Osteomyelitis   Final Diagnosis   Right fifth toe; wound debridement:   - Bone with acute osteomyelitis and necrotic periosteal tissue.     9/28/21: US Abdomen Upper Quadrant Right (09.28.21 @ 12:13) Cholelithiasis without evidence of acute cholecystitis. Note is made of right pleural effusion    9/27/21: CT Abdomen and Pelvis w/ Oral Cont (09.27.21 @ 15:53) >No acute intra-abdominal pathology.    Small bilateral pleural effusions with compressive atelectasis of both lower lobes.    9/26/21: Xray Chest 1 View-PORTABLE IMMEDIATE (Xray Chest 1 View-PORTABLE IMMEDIATE .) (09.26.21 @ 15:28) : Small bilateral pleural effusions and mild pulmonary edema. A right lower lobe pneumonia is not excluded.      9/26/21: Xray Abdomen 1 View Portable, IMMEDIATE (Xray Abdomen 1 View Portable, IMMEDIATE .) (09.26.21 @ 15:28) : There is a nasogastric tube with its tip in the stomach. There is gaseous distention of the colon. No pathologic calcifications are seen. The osseous structures are intact with degenerative change is present in the spine.      9/17/21 : MR Foot No Cont, Right (09.17.21 @ 13:04) : Resection at the mid aspect of the fifth metatarsal. Lateral soft tissue wound with marrow signal abnormality throughout the fifth metatarsal and within the adjacent fourth metatarsal head which is nonspecific in the setting of recent surgery although osteomyelitis is suspected.    9/16/21 : Xray Foot AP + Lateral + Oblique, Right (09.16.21 @ 15:03) : No acute finding. No plain film evidence of osteomyelitis.        MICROBIOLOGY DATA:    Urine Microscopic-Add On (NC) (09.22.21 @ 16:14)   Red Blood Cell - Urine: 0-2 /HPF   White Blood Cell - Urine: 3-5 /HPF   Bacteria: Moderate /HPF   Comment - Urine: yeast cells present   Epithelial Cells: Moderate /HPF     Culture - Tissue with Gram Stain (09.22.21 @ 13:54)   Gram Stain: No polymorphonuclear cells seen per low power field   No organisms seen per oil power field   Specimen Source: .Tissue Right 5th toe r/o osteomyeltis   Culture Results: No growth     COVID-19 David Domain Antibody (09.18.21 @ 12:55)   COVID-19 David Domain Antibody Result: >250.00:    Culture - Blood (09.17.21 @ 10:28)   Specimen Source: .Blood Blood-Peripheral   Culture Results: No growth to date.     Culture - Blood (09.17.21 @ 10:28)   Specimen Source: .Blood Blood-Venous   Culture Results: No growth to date.     Culture - Surgical Swab (09.16.21 @ 21:42)   - Amikacin: S <=16   - Amikacin: S <=16   - Amoxicillin/Clavulanic Acid: S <=8/4   - Ampicillin: R >16 These ampicillin results predict results for amoxicillin   - Ampicillin: S <=2 Predicts results to ampicillin/sulbactam, amoxacillin-clavulanate and piperacillin-tazobactam.   - Ampicillin/Sulbactam: S 8/4 Enterobacter, Citrobacter, and Serratia may develop resistance during prolonged therapy (3-4 days)   - Ampicillin/Sulbactam: R >16/8   - Aztreonam: S <=4   - Aztreonam: S <=4   - Cefazolin: R 16 Enterobacter, Citrobacter, and Serratia may develop resistance during prolonged therapy (3-4 days)   - Cefazolin: R >16   - Cefepime: S <=2   - Cefepime: S <=2   - Cefoxitin: S <=8   - Cefoxitin: S <=8   - Ceftazidime: S <=1   - Ceftriaxone: S <=1   - Ceftriaxone: S <=1 Enterobacter, Citrobacter, and Serratia may develop resistance during prolonged therapy   - Ciprofloxacin: S <=0.25   - Ciprofloxacin: S <=0.25   - Ertapenem: S <=0.5   - Gentamicin: S <=2   - Gentamicin: S <=2   - Imipenem: S <=1   - Levofloxacin: S <=0.5   - Levofloxacin: S <=0.5   - Meropenem: S <=1   - Meropenem: S <=1   - Piperacillin/Tazobactam: S <=8   - Piperacillin/Tazobactam: S <=8   - Tetra/Doxy: S 4   - Tobramycin: S <=2   - Trimethoprim/Sulfamethoxazole: S <=0.5/9.5   - Trimethoprim/Sulfamethoxazole: S <=0.5/9.5   - Vancomycin: S 2   Specimen Source: .Surgical Swab right foot wound   Culture Results:   Culture yields >4 types of aerobic and/or anaerobic bacteria   Call client services within 7 days if further workup is clinically   indicated. Culture includes   Rare Aeromonas hydrophila/caviae   Few Klebsiella oxytoca/Raoutella ornithinolytica   Few Enterococcus faecalis   Few Streptococcus agalactiae (Group B) isolated   Group B streptococci are susceptible to ampicillin,   penicillin and cefazolin, but may be resistant to   erythromycin and clindamycin.   Recommendations for intrapartum prophylaxis for Group B   streptococci are penicillin or ampicillin.   Organism Identification: Aeromonas hydrophila/caviae   Klebsiella oxytoca /Raoutella ornithinolytica   Enterococcus faecalis   Organism: Aeromonas hydrophila/caviae   Organism: Klebsiella oxytoca /Raoutella ornithinolytica   Organism: Enterococcus faecalis   COVID-19 PCR . (09.16.21 @ 22:24)   COVID-19 PCR: NotDetec:               Patient is seen and examined at the bed side, is afebrile.  He is doing better, mental status improved significantly.  The kidney function continue to improve.       REVIEW OF SYSTEMS: All other review systems are negative      ALLERGIES: No Known Allergies      Vital Signs Last 24 Hrs  T(C): 36.4 (30 Sep 2021 11:02), Max: 37 (29 Sep 2021 19:47)  T(F): 97.6 (30 Sep 2021 11:02), Max: 98.6 (29 Sep 2021 19:47)  HR: 74 (30 Sep 2021 11:02) (72 - 79)  BP: 125/57 (30 Sep 2021 11:02) (123/52 - 129/59)  BP(mean): --  RR: 19 (30 Sep 2021 11:02) (18 - 19)  SpO2: 94% (30 Sep 2021 11:02) (92% - 98%)      PHYSICAL EXAM:  GENERAL: Not in distress, on oxygen via NC  CHEST/LUNG:  Not using accessory muscles  HEART: s1 and s2 present  ABDOMEN:  Mild distended   EXTREMITIES: Right foot bandage in placed   CNS: Awake, Alert  and oriented        LABS:                        10.1   7.75  )-----------( 376      ( 30 Sep 2021 06:37 )             30.9                                    10.3   8.63  )-----------( 390      ( 29 Sep 2021 08:09 )             31.9       09-30    145  |  109<H>  |  30<H>  ----------------------------<  117<H>  4.0   |  28  |  1.66<H>    Ca    8.7      30 Sep 2021 06:37  Phos  3.0     09-30  Mg     2.6     09-30      09-29    146<H>  |  110<H>  |  33<H>  ----------------------------<  122<H>  3.9   |  27  |  1.73<H>    Ca    9.0      29 Sep 2021 08:09  Phos  2.8     09-29  Mg     2.5     09-29    TPro  7.1  /  Alb  2.3<L>  /  TBili  0.5  /  DBili  x   /  AST  20  /  ALT  15  /  AlkPhos  101  09-28 09-28    144  |  109<H>  |  39<H>  ----------------------------<  124<H>  3.7   |  27  |  2.24<H>    Ca    8.7      28 Sep 2021 07:39  Phos  3.0     09-28  Mg     2.3     09-28    TPro  7.1  /  Alb  2.3<L>  /  TBili  0.5  /  DBili  x   /  AST  20  /  ALT  15  /  AlkPhos  101  09-28 09-25    139  |  107  |  41<H>  ----------------------------<  134<H>  4.1   |  21<L>  |  4.05<H>    Ca    8.6      25 Sep 2021 06:40    TPro  6.6  /  Alb  2.3<L>  /  TBili  0.5  /  DBili  x   /  AST  25  /  ALT  15  /  AlkPhos  102  09-25        CAPILLARY BLOOD GLUCOSE  POCT Blood Glucose.: 134 mg/dL (17 Sep 2021 17:11)  POCT Blood Glucose.: 128 mg/dL (17 Sep 2021 11:06)  POCT Blood Glucose.: 157 mg/dL (17 Sep 2021 04:10)      MEDICATIONS  (STANDING):    ampicillin/sulbactam  IVPB 3 Gram(s) IV Intermittent every 6 hours  aspirin  chewable 81 milliGRAM(s) Oral daily  atorvastatin 80 milliGRAM(s) Oral at bedtime  bisacodyl Suppository 10 milliGRAM(s) Rectal once  cyanocobalamin 1000 MICROGram(s) Oral daily  ergocalciferol 01860 Unit(s) Oral <User Schedule>  ferrous    sulfate 325 milliGRAM(s) Oral daily  finasteride 5 milliGRAM(s) Oral daily  fluconAZOLE IVPB      fluconAZOLE IVPB 100 milliGRAM(s) IV Intermittent every 24 hours  heparin   Injectable 5000 Unit(s) SubCutaneous every 8 hours  influenza   Vaccine 0.5 milliLiter(s) IntraMuscular once  insulin lispro (ADMELOG) corrective regimen sliding scale   SubCutaneous Before meals and at bedtime  levoFLOXacin  Tablet 250 milliGRAM(s) Oral every 24 hours  metoprolol succinate  milliGRAM(s) Oral daily  NIFEdipine XL 60 milliGRAM(s) Oral daily  pantoprazole  Injectable 40 milliGRAM(s) IV Push two times a day  polyethylene glycol 3350 17 Gram(s) Oral daily  senna 2 Tablet(s) Oral at bedtime  tamsulosin 0.4 milliGRAM(s) Oral at bedtime      RADIOLOGY & ADDITIONAL TESTS:    Collected Date/Time: 9/22/2021 08:42 EDT   Received Date/Time: 9/23/2021 09:29 EDT   Surgical Pathology Report - Auth (Verified)   Specimen(s) Submitted   1 Right 5th Toe Wound Debridement r/o Osteomyelitis   Final Diagnosis   Right fifth toe; wound debridement:   - Bone with acute osteomyelitis and necrotic periosteal tissue.     9/28/21: US Abdomen Upper Quadrant Right (09.28.21 @ 12:13) Cholelithiasis without evidence of acute cholecystitis. Note is made of right pleural effusion    9/27/21: CT Abdomen and Pelvis w/ Oral Cont (09.27.21 @ 15:53) >No acute intra-abdominal pathology.    Small bilateral pleural effusions with compressive atelectasis of both lower lobes.    9/26/21: Xray Chest 1 View-PORTABLE IMMEDIATE (Xray Chest 1 View-PORTABLE IMMEDIATE .) (09.26.21 @ 15:28) : Small bilateral pleural effusions and mild pulmonary edema. A right lower lobe pneumonia is not excluded.      9/26/21: Xray Abdomen 1 View Portable, IMMEDIATE (Xray Abdomen 1 View Portable, IMMEDIATE .) (09.26.21 @ 15:28) : There is a nasogastric tube with its tip in the stomach. There is gaseous distention of the colon. No pathologic calcifications are seen. The osseous structures are intact with degenerative change is present in the spine.      9/17/21 : MR Foot No Cont, Right (09.17.21 @ 13:04) : Resection at the mid aspect of the fifth metatarsal. Lateral soft tissue wound with marrow signal abnormality throughout the fifth metatarsal and within the adjacent fourth metatarsal head which is nonspecific in the setting of recent surgery although osteomyelitis is suspected.    9/16/21 : Xray Foot AP + Lateral + Oblique, Right (09.16.21 @ 15:03) : No acute finding. No plain film evidence of osteomyelitis.        MICROBIOLOGY DATA:    Urine Microscopic-Add On (NC) (09.22.21 @ 16:14)   Red Blood Cell - Urine: 0-2 /HPF   White Blood Cell - Urine: 3-5 /HPF   Bacteria: Moderate /HPF   Comment - Urine: yeast cells present   Epithelial Cells: Moderate /HPF     Culture - Tissue with Gram Stain (09.22.21 @ 13:54)   Gram Stain: No polymorphonuclear cells seen per low power field   No organisms seen per oil power field   Specimen Source: .Tissue Right 5th toe r/o osteomyeltis   Culture Results: No growth     COVID-19 David Domain Antibody (09.18.21 @ 12:55)   COVID-19 David Domain Antibody Result: >250.00:    Culture - Blood (09.17.21 @ 10:28)   Specimen Source: .Blood Blood-Peripheral   Culture Results: No growth to date.     Culture - Blood (09.17.21 @ 10:28)   Specimen Source: .Blood Blood-Venous   Culture Results: No growth to date.     Culture - Surgical Swab (09.16.21 @ 21:42)   - Amikacin: S <=16   - Amikacin: S <=16   - Amoxicillin/Clavulanic Acid: S <=8/4   - Ampicillin: R >16 These ampicillin results predict results for amoxicillin   - Ampicillin: S <=2 Predicts results to ampicillin/sulbactam, amoxacillin-clavulanate and piperacillin-tazobactam.   - Ampicillin/Sulbactam: S 8/4 Enterobacter, Citrobacter, and Serratia may develop resistance during prolonged therapy (3-4 days)   - Ampicillin/Sulbactam: R >16/8   - Aztreonam: S <=4   - Aztreonam: S <=4   - Cefazolin: R 16 Enterobacter, Citrobacter, and Serratia may develop resistance during prolonged therapy (3-4 days)   - Cefazolin: R >16   - Cefepime: S <=2   - Cefepime: S <=2   - Cefoxitin: S <=8   - Cefoxitin: S <=8   - Ceftazidime: S <=1   - Ceftriaxone: S <=1   - Ceftriaxone: S <=1 Enterobacter, Citrobacter, and Serratia may develop resistance during prolonged therapy   - Ciprofloxacin: S <=0.25   - Ciprofloxacin: S <=0.25   - Ertapenem: S <=0.5   - Gentamicin: S <=2   - Gentamicin: S <=2   - Imipenem: S <=1   - Levofloxacin: S <=0.5   - Levofloxacin: S <=0.5   - Meropenem: S <=1   - Meropenem: S <=1   - Piperacillin/Tazobactam: S <=8   - Piperacillin/Tazobactam: S <=8   - Tetra/Doxy: S 4   - Tobramycin: S <=2   - Trimethoprim/Sulfamethoxazole: S <=0.5/9.5   - Trimethoprim/Sulfamethoxazole: S <=0.5/9.5   - Vancomycin: S 2   Specimen Source: .Surgical Swab right foot wound   Culture Results:   Culture yields >4 types of aerobic and/or anaerobic bacteria   Call client services within 7 days if further workup is clinically   indicated. Culture includes   Rare Aeromonas hydrophila/caviae   Few Klebsiella oxytoca/Raoutella ornithinolytica   Few Enterococcus faecalis   Few Streptococcus agalactiae (Group B) isolated   Group B streptococci are susceptible to ampicillin,   penicillin and cefazolin, but may be resistant to   erythromycin and clindamycin.   Recommendations for intrapartum prophylaxis for Group B   streptococci are penicillin or ampicillin.   Organism Identification: Aeromonas hydrophila/caviae   Klebsiella oxytoca /Raoutella ornithinolytica   Enterococcus faecalis   Organism: Aeromonas hydrophila/caviae   Organism: Klebsiella oxytoca /Raoutella ornithinolytica   Organism: Enterococcus faecalis   COVID-19 PCR . (09.16.21 @ 22:24)   COVID-19 PCR: NotDetec:

## 2021-09-30 NOTE — PROGRESS NOTE ADULT - ASSESSMENT
76 year old male with improved bowel distention, asymptomatic cholelithiasis/sludge    - diet as tolerated  - no acute surgical intervention at this time  - outpatient follow up for elective laparoscopic cholecystectomy   - GI   - reconsult as needed  - will discuss with Dr. Sagastume

## 2021-09-30 NOTE — CHART NOTE - NSCHARTNOTEFT_GEN_A_CORE
Reassessment:     Patient is a 76y old  Male who presents with a chief complaint of R Foot Pain (30 Sep 2021 15:25)      Factors impacting intake: [ ] none [ ] nausea  [ ] vomiting [ ] diarrhea [ ] constipation  [ ]chewing problems [ ] swallowing issues  [X ] other: infected foot, osteomyelitis of rt. foot, CAD, diabetes, HTN,     Diet Prescription: Diet, Soft:   Consistent Carbohydrate {No Snacks}  1000mL Fluid Restriction ([id number])  Supplement Feeding Modality:  Oral  Ensure Enlive Cans or Servings Per Day:  1       Frequency:  Two Times a day (21 @ 14:00)    Intake: Patient visited in the am, alert but confused, d/w PCA/nursing, pt. with poor to fair po intake, requesting softer textured meals, intake ~40%, ongoing foot wound care, on IV Abx. rec. Nursing to continue feeding assistance and encouragement, aspiration precaution, suggest to change diet to Soft, consistent carbohydrate w/evening snack, Renal & add Nepro 1 can BID as medically feasible.    Daily Weight in k.2 (30 Sep 2021 04:54)  Weight in k.3 (28 Sep 2021 04:57)    % Weight Change: stable     Pertinent Medications: MEDICATIONS  (STANDING):  ampicillin/sulbactam  IVPB 3 Gram(s) IV Intermittent every 6 hours  aspirin  chewable 81 milliGRAM(s) Oral daily  atorvastatin 80 milliGRAM(s) Oral at bedtime  bisacodyl Suppository 10 milliGRAM(s) Rectal once  cyanocobalamin 1000 MICROGram(s) Oral daily  ergocalciferol 09010 Unit(s) Oral <User Schedule>  ferrous    sulfate 325 milliGRAM(s) Oral daily  finasteride 5 milliGRAM(s) Oral daily  fluconAZOLE IVPB      fluconAZOLE IVPB 100 milliGRAM(s) IV Intermittent every 24 hours  heparin   Injectable 5000 Unit(s) SubCutaneous every 8 hours  influenza   Vaccine 0.5 milliLiter(s) IntraMuscular once  insulin lispro (ADMELOG) corrective regimen sliding scale   SubCutaneous Before meals and at bedtime  levoFLOXacin  Tablet 250 milliGRAM(s) Oral every 24 hours  metoprolol succinate  milliGRAM(s) Oral daily  NIFEdipine XL 60 milliGRAM(s) Oral daily  pantoprazole  Injectable 40 milliGRAM(s) IV Push two times a day  polyethylene glycol 3350 17 Gram(s) Oral daily  senna 2 Tablet(s) Oral at bedtime  tamsulosin 0.4 milliGRAM(s) Oral at bedtime    MEDICATIONS  (PRN):  ondansetron Injectable 4 milliGRAM(s) IV Push three times a day PRN Nausea and/or Vomiting    Pertinent Labs:  Na145 mmol/L Glu 117 mg/dL<H> K+ 4.0 mmol/L Cr  1.66 mg/dL<H> BUN 30 mg/dL<H>  Phos 3.0 mg/dL  Alb 2.3 g/dL<L>     CAPILLARY BLOOD GLUCOSE      POCT Blood Glucose.: 193 mg/dL (30 Sep 2021 16:57)  POCT Blood Glucose.: 172 mg/dL (30 Sep 2021 11:31)  POCT Blood Glucose.: 121 mg/dL (30 Sep 2021 08:18)  POCT Blood Glucose.: 129 mg/dL (29 Sep 2021 21:02)    Skin: ongoing rt. foot wound care & Podiatry following     Estimated Needs:   [ X] no change since previous assessment  [ ] recalculated:     Previous Nutrition Diagnosis:   [ ] Inadequate Energy Intake [X ]Inadequate Oral Intake [ ] Excessive Energy Intake   [ ] Underweight [ ] Increased Nutrient Needs [ ] Overweight/Obesity   [ ] Altered GI Function [ ] Unintended Weight Loss [ ] Food & Nutrition Related Knowledge Deficit [ ] Malnutrition     Nutrition Diagnosis is [X ] ongoing  [ ] resolved [ ] not applicable     New Nutrition Diagnosis: [ ] not applicable     Interventions: To meet nutrition needs     Recommend  [X ] Change Diet To: Nephro-Luis/Vit. C 500mg for wound healing as medically feasible    [ ] Nutrition Supplement  [ ] Nutrition Support  [X ] Other: Rec. Swallow evaluation as medically feasible     Monitoring and Evaluation:   [X ] PO intake [ x ] Tolerance to diet prescription [ x ] weights [ x ] labs[ x ] follow up per protocol  [ ] other: Reassessment:     Patient is a 76y old  Male who presents with a chief complaint of R Foot Pain (30 Sep 2021 15:25)      Factors impacting intake: [ ] none [ ] nausea  [ ] vomiting [ ] diarrhea [ ] constipation  [ ]chewing problems [ ] swallowing issues  [X ] other: infected foot, osteomyelitis of rt. foot, CAD, diabetes, HTN,     Diet Prescription: Diet, Soft:   Consistent Carbohydrate {No Snacks}  1000mL Fluid Restriction ([id number])  Supplement Feeding Modality:  Oral  Ensure Enlive Cans or Servings Per Day:  1       Frequency:  Two Times a day (21 @ 14:00)    Intake: Patient visited in the am, alert but confused, d/w PCA/nursing, pt. with poor to fair po intake, requesting softer textured meals, intake ~40%, attempts was made by SLP/team to see pt. on 21 noted, ongoing foot wound care, on IV Abx. rec. Nursing to continue feeding assistance and encouragement, aspiration precaution, suggest to change diet to Soft, consistent carbohydrate w/evening snack, Renal & add Nepro 1 can BID as medically feasible.    Daily Weight in k.2 (30 Sep 2021 04:54)  Weight in k.3 (28 Sep 2021 04:57)    % Weight Change: stable     Pertinent Medications: MEDICATIONS  (STANDING):  ampicillin/sulbactam  IVPB 3 Gram(s) IV Intermittent every 6 hours  aspirin  chewable 81 milliGRAM(s) Oral daily  atorvastatin 80 milliGRAM(s) Oral at bedtime  bisacodyl Suppository 10 milliGRAM(s) Rectal once  cyanocobalamin 1000 MICROGram(s) Oral daily  ergocalciferol 88835 Unit(s) Oral <User Schedule>  ferrous    sulfate 325 milliGRAM(s) Oral daily  finasteride 5 milliGRAM(s) Oral daily  fluconAZOLE IVPB      fluconAZOLE IVPB 100 milliGRAM(s) IV Intermittent every 24 hours  heparin   Injectable 5000 Unit(s) SubCutaneous every 8 hours  influenza   Vaccine 0.5 milliLiter(s) IntraMuscular once  insulin lispro (ADMELOG) corrective regimen sliding scale   SubCutaneous Before meals and at bedtime  levoFLOXacin  Tablet 250 milliGRAM(s) Oral every 24 hours  metoprolol succinate  milliGRAM(s) Oral daily  NIFEdipine XL 60 milliGRAM(s) Oral daily  pantoprazole  Injectable 40 milliGRAM(s) IV Push two times a day  polyethylene glycol 3350 17 Gram(s) Oral daily  senna 2 Tablet(s) Oral at bedtime  tamsulosin 0.4 milliGRAM(s) Oral at bedtime    MEDICATIONS  (PRN):  ondansetron Injectable 4 milliGRAM(s) IV Push three times a day PRN Nausea and/or Vomiting    Pertinent Labs:  Na145 mmol/L Glu 117 mg/dL<H> K+ 4.0 mmol/L Cr  1.66 mg/dL<H> BUN 30 mg/dL<H>  Phos 3.0 mg/dL  Alb 2.3 g/dL<L>     CAPILLARY BLOOD GLUCOSE      POCT Blood Glucose.: 193 mg/dL (30 Sep 2021 16:57)  POCT Blood Glucose.: 172 mg/dL (30 Sep 2021 11:31)  POCT Blood Glucose.: 121 mg/dL (30 Sep 2021 08:18)  POCT Blood Glucose.: 129 mg/dL (29 Sep 2021 21:02)    Skin: ongoing rt. foot wound care & Podiatry following     Estimated Needs:   [ X] no change since previous assessment  [ ] recalculated:     Previous Nutrition Diagnosis:   [ ] Inadequate Energy Intake [X ]Inadequate Oral Intake [ ] Excessive Energy Intake   [ ] Underweight [ ] Increased Nutrient Needs [ ] Overweight/Obesity   [ ] Altered GI Function [ ] Unintended Weight Loss [ ] Food & Nutrition Related Knowledge Deficit [ ] Malnutrition     Nutrition Diagnosis is [X ] ongoing  [ ] resolved [ ] not applicable     New Nutrition Diagnosis: [ ] not applicable     Interventions: To meet nutrition needs     Recommend  [X ] Change Diet To: Nephro-Luis/Vit. C 500mg for wound healing as medically feasible    [ ] Nutrition Supplement  [ ] Nutrition Support  [X ] Other: Rec. Swallow evaluation as medically feasible     Monitoring and Evaluation:   [X ] PO intake [ x ] Tolerance to diet prescription [ x ] weights [ x ] labs[ x ] follow up per protocol  [ ] other: Reassessment:     Patient is a 76y old  Male who presents with a chief complaint of R Foot Pain (30 Sep 2021 15:25)      Factors impacting intake: [ ] none [ ] nausea  [ ] vomiting [ ] diarrhea [ ] constipation  [ ]chewing problems [ ] swallowing issues  [X ] other: infected foot, osteomyelitis of rt. foot, CAD, diabetes, HTN,     Diet Prescription: Diet, Soft:   Consistent Carbohydrate {No Snacks}  1000mL Fluid Restriction ([id number])  Supplement Feeding Modality:  Oral  Ensure Enlive Cans or Servings Per Day:  1       Frequency:  Two Times a day (21 @ 14:00)    Intake: Patient visited in the am, alert but confused, d/w PCA/nursing, pt. with poor to fair po intake, requesting softer textured meals, intake ~40%, attempts was made by SLP/team to see pt. on 21 noted, update kitchen/Diet Tech, ongoing foot wound care, on IV Abx. rec. Nursing to continue feeding assistance and encouragement, aspiration precaution, suggest to change diet to Soft, consistent carbohydrate w/evening snack, Renal & add Nepro 1 can BID as medically feasible.    Daily Weight in k.2 (30 Sep 2021 04:54)  Weight in k.3 (28 Sep 2021 04:57)    % Weight Change: stable     Pertinent Medications: MEDICATIONS  (STANDING):  ampicillin/sulbactam  IVPB 3 Gram(s) IV Intermittent every 6 hours  aspirin  chewable 81 milliGRAM(s) Oral daily  atorvastatin 80 milliGRAM(s) Oral at bedtime  bisacodyl Suppository 10 milliGRAM(s) Rectal once  cyanocobalamin 1000 MICROGram(s) Oral daily  ergocalciferol 73155 Unit(s) Oral <User Schedule>  ferrous    sulfate 325 milliGRAM(s) Oral daily  finasteride 5 milliGRAM(s) Oral daily  fluconAZOLE IVPB      fluconAZOLE IVPB 100 milliGRAM(s) IV Intermittent every 24 hours  heparin   Injectable 5000 Unit(s) SubCutaneous every 8 hours  influenza   Vaccine 0.5 milliLiter(s) IntraMuscular once  insulin lispro (ADMELOG) corrective regimen sliding scale   SubCutaneous Before meals and at bedtime  levoFLOXacin  Tablet 250 milliGRAM(s) Oral every 24 hours  metoprolol succinate  milliGRAM(s) Oral daily  NIFEdipine XL 60 milliGRAM(s) Oral daily  pantoprazole  Injectable 40 milliGRAM(s) IV Push two times a day  polyethylene glycol 3350 17 Gram(s) Oral daily  senna 2 Tablet(s) Oral at bedtime  tamsulosin 0.4 milliGRAM(s) Oral at bedtime    MEDICATIONS  (PRN):  ondansetron Injectable 4 milliGRAM(s) IV Push three times a day PRN Nausea and/or Vomiting    Pertinent Labs:  Na145 mmol/L Glu 117 mg/dL<H> K+ 4.0 mmol/L Cr  1.66 mg/dL<H> BUN 30 mg/dL<H>  Phos 3.0 mg/dL  Alb 2.3 g/dL<L>     CAPILLARY BLOOD GLUCOSE      POCT Blood Glucose.: 193 mg/dL (30 Sep 2021 16:57)  POCT Blood Glucose.: 172 mg/dL (30 Sep 2021 11:31)  POCT Blood Glucose.: 121 mg/dL (30 Sep 2021 08:18)  POCT Blood Glucose.: 129 mg/dL (29 Sep 2021 21:02)    Skin: ongoing rt. foot wound care & Podiatry following     Estimated Needs:   [ X] no change since previous assessment  [ ] recalculated:     Previous Nutrition Diagnosis:   [ ] Inadequate Energy Intake [X ]Inadequate Oral Intake [ ] Excessive Energy Intake   [ ] Underweight [ ] Increased Nutrient Needs [ ] Overweight/Obesity   [ ] Altered GI Function [ ] Unintended Weight Loss [ ] Food & Nutrition Related Knowledge Deficit [ ] Malnutrition     Nutrition Diagnosis is [X ] ongoing  [ ] resolved [ ] not applicable     New Nutrition Diagnosis: [ ] not applicable     Interventions: To meet nutrition needs     Recommend  [X ] Change Diet To: Nephro-Luis/Vit. C 500mg for wound healing as medically feasible    [ ] Nutrition Supplement  [ ] Nutrition Support  [X ] Other: Rec. Swallow evaluation as medically feasible     Monitoring and Evaluation:   [X ] PO intake [ x ] Tolerance to diet prescription [ x ] weights [ x ] labs[ x ] follow up per protocol  [ ] other:

## 2021-09-30 NOTE — PROGRESS NOTE ADULT - ASSESSMENT
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  As per daughter, patient was taking tylenol which did not help his pain prompting the patient to ask his daughter to take him to the hospital. ON admission, he has no fever or Leukocytosis. The Xray of Right foot shows no Osteomyelitis but MRI of Right foot shows Lateral soft tissue wound with marrow signal abnormality throughout the fifth metatarsal which is consistent of Osteomyelitis. He has seen by Podiatry and wound culture has sent, Zosyn and Vancomycin has started. The ID consult requested to assist with further evaluation and antibiotic management.     # Right Fifth metatarsal DFU with drainage and Osteomyelitis- wound cx grew Enterococcus, Aeromonas and Klebsiella - Zosyn is the ideal to cover All organisms but kidney function is worsening, hence change to Unasyn and Levaquin until kidney function is improved - The pathology shows Bone with acute osteomyelitis and necrotic periosteal tissue.   # OREN- s/p urinary retention -s/p ibrahim catheter - s/p discontinue ACEI  # RLL pneumonia- most likely Aspiration, S/p Vomiting - On Unasyn  # Large bowel distension  # Candiduria- 9/22/21    would recommend:    1. Wean off oxygen as tolerated   2. Monitor  kidney function and adjust doses of Levaquin and  Unasyn as per crcl  3. Wound care as per Podiatry  4. Taper off pain medications since become confused  5. Aspiration precaution  6. Need at least 6 weeks of IV Abx to cover Osteomyelitis    d/w House staff     Attending Attestation:    Spent more than 35 minutes on total encounter, more than 50 % of the visit was spent counseling and/or coordinating care by the Attending physician.

## 2021-09-30 NOTE — PROGRESS NOTE ADULT - ATTENDING COMMENTS
HPI:  76M from home, lives with daughter w/ PMH DM on insulin, HTN, HLD, CAD, 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 Pt with sharp pain on the R lateral foot. As per daughter, pt was taking tylenol which did not help his pain prompting the patient to ask his daughter to take him to the hospital. Pt is a poor historian. Daughter states that the patient did not see his podiatrist after the surgery. No fever, chest, pain, palpitations, nausea, vomiting, diarrhea (17 Sep 2021 01:11)    # SUSPECT RIGHT FOOT OSTEOMYELITIS S/P RIGHT 5TH RAY RESECTION [8/19] AND S/P DEBRIDEMENT, GRAFT APPLICATION, BONE BX AND WOUND VAC APPLICATION 9/22  ** RECENT ADMISSION FOR RIGHT DIABETIC FOOT ULCER, CELLULITIS, OSTEOMYELITIS RIGHT 5th METATARSAL S/P RIGHT 5TH PARTIAL RAY AMPUTATION [8/20] - BONE PATHOLOGY W/ CLEAN MARGINS    - S/P VANCOMYCIN AND ZOSYN ; NOW ON UNASYN AND LEVAQUIN GIVEN OREN  - RECENTLY HAD SIMON W/ MILD PAD  - REVIEWED WOUND CX [ ENTEROCOCCUS, AEROMONAS, KLEBSIELLA] AND BCX   - ID CONSULT, PODIATRY CONSULT    # ACUTE HYPOXIC RESPIRATORY FAILURE SUSPECT S/T PULMONARY EDEMA IN THE SETTING OF SEVERE AORTIC STENOSIS + ASPIRATION PNA - ON LEVAQUIN, STARTED LASIX, CARDIOLOGY CONSULT IN PROGRESS  - EVALUATED BY CRITICAL CARE THIS A.M. - PATIENT HAS SINCE IMPROVED  - WILL GIVE LASIX [9/30]  - REPEAT CXR IN A.M. [10/1]      # BOWEL DISTENTION - ? ILEUS - OPTIMIZING ELECTROLYTES  - SURGERY CONSULT IN PROGRESS  - PASSED 2 BMS ON 9/27    # ASPIRATION EVENT WHEN PATIENT REMOVED NGT - ON LEVAQUIN, ID CONSULT IN PROGRESS    # CHOLELITHIASIS - TRENDING LFTS, RUQ U/S - CHOLELITHIASIS, SURGERY CONSULT IN PROGRESS  - GI CONSULT IN PROGRESS - LESS SUSPICIOUS FOR CHOLECYSTITIS    # ? DYSPEPSIA - PLACED ON PPI BID, IMPROVED, UNDERWENT ST EVAL - TOLERATING LIQUID DIET TODAY    # ELEVATED TROPONINS - NSTEMI - ? DEMAND - WILL NEED ISCHEMIC EVAL, CARDIOLOGY CONSULT IN PROGRESS  - ON ASA, STATIN, BB    # OREN ON CKD - S/T ATN AND URINARY RETENTION - S/P IVF, NOW W/ CASTAÑEDA, NEPHROLOGY CONUSLT IN PROGRESS    # MEDICAL CLEARANCE FOR SURGERY - RCRI - 2 - 10.1 % 30 DAY RISK OF DEATH, MI OR CARDIAC ARREST  - CARDIOLOGY CONSULT     # DM -  HBA1C - 11  - LANTUS, SSI + FS    # CAD S/P CABG - PLACED ON ASA, STATIN, BB  - ECHO - SEVERE AORTIC STENOSIS, SEVERE CONCENTRIC LVH, G1DD, MILD UT  - CARDIOLOGY CONSULT - DR. BASSETT   - MAY NEED ISCHEMIC EVAL ONCE ACUTE ILLNESS RESOLVES    # HTN - ON METOPROLOL, NICARDIPINE    # SEVERE, ASYMPTOMATIC, STENOSIS - ONCE ACUTE ILLNESS RESOLVES, WILL LIKELY NEED ISCHEMIC WORKUP, SABIHA TO EVALUATE FURTHER. D/W PATIENT, DAUGHTER AND CARDIOLOGY TEAM [PREVIOUSLY SAW DR. BASSETT]    # VITAMIN D DEFICIENCY - ON CHOLECALCIFEROL    # HLD - STATIN    # CASE DISCUSSED AT LENGTH WITH PATIENT AND DAUGHTER, BIRDIE ROBERT AT BEDSIDE  # PATIENT AND DAUGHTER REFUSED [facility] ON PREVIOUS ADMISSION, PREVIOUSLY WISHED FOR PATIENT RETURN HOME W/ HOME PT; HOWEVER NOW, DAUGHTER [facility]'S WIFE IS CURRENTLY ADMITTED THERE    # GI AND DVT PPX HPI:  76M from home, lives with daughter w/ PMH DM on insulin, HTN, HLD, CAD, 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 Pt with sharp pain on the R lateral foot. As per daughter, pt was taking tylenol which did not help his pain prompting the patient to ask his daughter to take him to the hospital. Pt is a poor historian. Daughter states that the patient did not see his podiatrist after the surgery. No fever, chest, pain, palpitations, nausea, vomiting, diarrhea (17 Sep 2021 01:11)    # SUSPECT RIGHT FOOT OSTEOMYELITIS S/P RIGHT 5TH RAY RESECTION [8/19] AND S/P DEBRIDEMENT, GRAFT APPLICATION, BONE BX AND WOUND VAC APPLICATION 9/22  ** RECENT ADMISSION FOR RIGHT DIABETIC FOOT ULCER, CELLULITIS, OSTEOMYELITIS RIGHT 5th METATARSAL S/P RIGHT 5TH PARTIAL RAY AMPUTATION [8/20] - BONE PATHOLOGY W/ CLEAN MARGINS    - S/P VANCOMYCIN AND ZOSYN ; NOW ON UNASYN AND LEVAQUIN GIVEN OREN  - RECENTLY HAD SIMON W/ MILD PAD  - REVIEWED WOUND CX [ ENTEROCOCCUS, AEROMONAS, KLEBSIELLA] AND BCX  - ID CONSULT, PODIATRY CONSULT    # ACUTE HYPOXIC RESPIRATORY FAILURE SUSPECT S/T PULMONARY EDEMA IN THE SETTING OF SEVERE AORTIC STENOSIS + ASPIRATION PNA - ON LEVAQUIN, STARTED LASIX, CARDIOLOGY CONSULT IN PROGRESS  - S/P CRITICAL CARE CONSULT  - WILL GIVE LASIX [9/30]  - REPEAT CXR IN A.M. [10/1]      # BOWEL DISTENTION - ? ILEUS - OPTIMIZING ELECTROLYTES  - SURGERY CONSULT IN PROGRESS  - PASSED 2 BMS ON 9/27    # ASPIRATION EVENT WHEN PATIENT REMOVED NGT - ON LEVAQUIN, ID CONSULT IN PROGRESS    # CHOLELITHIASIS - TRENDING LFTS, RUQ U/S - CHOLELITHIASIS, SURGERY CONSULT IN PROGRESS  - GI CONSULT IN PROGRESS - LESS SUSPICIOUS FOR CHOLECYSTITIS    # ? DYSPEPSIA - PLACED ON PPI BID, IMPROVED, UNDERWENT ST EVAL - TOLERATING LIQUID DIET TODAY    # ELEVATED TROPONINS - NSTEMI - ? DEMAND - WILL NEED ISCHEMIC EVAL ONCE ACUTE INFECTION RESOLVES PER CARDIOLOGY, CARDIOLOGY CONSULT IN PROGRESS  - ON ASA, STATIN, BB    # OREN ON CKD - S/T ATN AND URINARY RETENTION - S/P IVF, NOW W/ CASTAÑEDA, NEPHROLOGY CONUSLT IN PROGRESS    # MEDICAL CLEARANCE FOR SURGERY - RCRI - 2 - 10.1 % 30 DAY RISK OF DEATH, MI OR CARDIAC ARREST  - CARDIOLOGY CONSULT     # DM -  HBA1C - 11  - LANTUS, SSI + FS    # CAD S/P CABG - PLACED ON ASA, STATIN, BB  - ECHO - SEVERE AORTIC STENOSIS, SEVERE CONCENTRIC LVH, G1DD, MILD NM  - CARDIOLOGY CONSULT - DR. BASSETT   - MAY NEED ISCHEMIC EVAL ONCE ACUTE ILLNESS RESOLVES    # HTN - ON METOPROLOL, NICARDIPINE    # SEVERE, ASYMPTOMATIC, STENOSIS - ONCE ACUTE ILLNESS RESOLVES, WILL LIKELY NEED ISCHEMIC WORKUP, SABIHA TO EVALUATE FURTHER. D/W PATIENT, DAUGHTER AND CARDIOLOGY TEAM [PREVIOUSLY SAW DR. BASSETT]    # VITAMIN D DEFICIENCY - ON CHOLECALCIFEROL    # HLD - STATIN    # CASE DISCUSSED AT LENGTH WITH PATIENT AND DAUGHTER, BIRDIE ROBERT AT BEDSIDE  # PATIENT AND DAUGHTER REFUSED [facility] ON PREVIOUS ADMISSION, PREVIOUSLY WISHED FOR PATIENT RETURN HOME W/ HOME PT; HOWEVER NOW, DAUGHTER W[facility] AS [facility]'S WIFE IS CURRENTLY ADMITTED THERE    # GI AND DVT PPX

## 2021-09-30 NOTE — PROGRESS NOTE ADULT - SUBJECTIVE AND OBJECTIVE BOX
[facility] NEPHROLOGY- PROGRESS NOTE    Patient is a 75yo Male with DM on insulin, HTN, HLD, CAD, s/p R partial 5th ray amputation 8/19/2021 p/w R foot pain and foul drainage a/w diabetic foot ulcer suspicious for osteomyelitis. s/p OR debridement today. Nephrology consulted for abrupt rise in SCr.   s/p ibrahim placement for distended bladder on 9/23 with ~400ml of urine o/p  9/27- pt with SOB; CXR with pulmonary edema- pt given Lasix 80mg IV x1. Concern for aspiration PNA    Hospital Medications: Medications reviewed.  REVIEW OF SYSTEMS:  CONSTITUTIONAL: No fevers or chills  RESPIRATORY: no shortness of breath  CARDIOVASCULAR: No chest pain.  GASTROINTESTINAL: No nausea or vomiting, ordiarrhea or abdominal pain    VASCULAR: No bilateral lower extremity edema. Rt foot - denies pain    VITALS:  T(F): 97.6 (09-30-21 @ 11:02), Max: 98.6 (09-29-21 @ 19:47)  HR: 74 (09-30-21 @ 11:02)  BP: 125/57 (09-30-21 @ 11:02)  RR: 19 (09-30-21 @ 11:02)  SpO2: 94% (09-30-21 @ 11:02)  Wt(kg): --    09-29 @ 07:01  -  09-30 @ 07:00  --------------------------------------------------------  IN: 190 mL / OUT: 500 mL / NET: -310 mL      PHYSICAL EXAM:  Gen: NAD, calm  HEENT: MMM  Neck: no JVD  Cards: RRR, +S1/S2, +TRINIDAD  Resp: decreased BS at bases  GI: soft, NT  Extremities: no LE edema B/L  : +ibrahim  Derm: Rt foot wrapped/ wound vac    LABS:  09-30    145  |  109<H>  |  30<H>  ----------------------------<  117<H>  4.0   |  28  |  1.66<H>    Ca    8.7      30 Sep 2021 06:37  Phos  3.0     09-30  Mg     2.6     09-30      Creatinine Trend: 1.66 <--, 1.73 <--, 2.24 <--, 3.19 <--, 3.83 <--, 4.05 <--, 3.97 <--                        10.1   7.75  )-----------( 376      ( 30 Sep 2021 06:37 )             30.9     Urine Studies:

## 2021-09-30 NOTE — PROGRESS NOTE ADULT - SUBJECTIVE AND OBJECTIVE BOX
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 He tried taking Tylenol for the pain with minimal relief. Of note, the patient never followed up with podiatry after his procedure in August. MRI confirms suspected osteomyelitis - patient followed by podiatry and ID, patient undergo debridement and wound VAC placement. Patient currently on course of Unasyn and Levofloxacin, ID to follow up final surgical path. 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 Nephrology Dr. Rodriguez following. Hospital course complicated by abdominal distention and constipation for which he received lactulose and vomited. The patient had subsequent respiratory distress and it is suspected he aspirated as CXR showed possible RLL PNA. AXR shows distended loops of bowel for which NG tube was placed which the patient self d/c'd overnight 9/26 - re-attempts to replace NG tube were unsuccessful as patient was non-cooperative. Surgery following. CT abdomen deferred for now as patient is unstable and will not tolerate lying flat. ICU consulted given patient's deterioration in clinical status.       --- acute pulmonary edema, troponin elevated-- likely demand ischemia, BNP > 20,000 : transfer to telemetry    INTERVAL HPI/OVERNIGHT EVENTS:   Patient without acute complaints, no overnight events noted      REVIEW OF SYSTEMS:  CONSTITUTIONAL: No fever, weight loss, or fatigue  RESPIRATORY: No cough, wheezing, chills or hemoptysis; No shortness of breath  CARDIOVASCULAR: No chest pain, palpitations, dizziness, or leg swelling  GASTROINTESTINAL: No abdominal pain. No nausea, vomiting, or hematemesis; No diarrhea or constipation. No melena or hematochezia.  GENITOURINARY: No dysuria or hematuria, urinary frequency  NEUROLOGICAL: No headaches, memory loss, loss of strength, numbness, or tremors  SKIN: No itching, burning, rashes, or lesions     MEDICATIONS  (STANDING):  ampicillin/sulbactam  IVPB 3 Gram(s) IV Intermittent every 12 hours  aspirin  chewable 81 milliGRAM(s) Oral daily  atorvastatin 80 milliGRAM(s) Oral at bedtime  bisacodyl Suppository 10 milliGRAM(s) Rectal once  cyanocobalamin 1000 MICROGram(s) Oral daily  ergocalciferol 58684 Unit(s) Oral <User Schedule>  ferrous    sulfate 325 milliGRAM(s) Oral daily  fluconAZOLE IVPB      fluconAZOLE IVPB 100 milliGRAM(s) IV Intermittent every 24 hours  heparin   Injectable 5000 Unit(s) SubCutaneous every 8 hours  influenza   Vaccine 0.5 milliLiter(s) IntraMuscular once  insulin lispro (ADMELOG) corrective regimen sliding scale   SubCutaneous Before meals and at bedtime  levoFLOXacin  Tablet 250 milliGRAM(s) Oral every 48 hours  metoprolol succinate  milliGRAM(s) Oral daily  NIFEdipine XL 60 milliGRAM(s) Oral daily  pantoprazole  Injectable 40 milliGRAM(s) IV Push two times a day  polyethylene glycol 3350 17 Gram(s) Oral daily  senna 2 Tablet(s) Oral at bedtime  tamsulosin 0.4 milliGRAM(s) Oral at bedtime    MEDICATIONS  (PRN):  ondansetron Injectable 4 milliGRAM(s) IV Push three times a day PRN Nausea and/or Vomiting      Vital Signs Last 24 Hrs  T(C): 36.4 (30 Sep 2021 11:02), Max: 37 (29 Sep 2021 19:47)  T(F): 97.6 (30 Sep 2021 11:02), Max: 98.6 (29 Sep 2021 19:47)  HR: 74 (30 Sep 2021 11:02) (72 - 79)  BP: 125/57 (30 Sep 2021 11:02) (117/77 - 129/59)  BP(mean): --  RR: 19 (30 Sep 2021 11:02) (18 - 19)  SpO2: 94% (30 Sep 2021 11:02) (92% - 98%)    PHYSICAL EXAMINATION:  GENERAL: NAD, well-developed  HEAD:  Atraumatic, Normocephalic  EYES: EOMI, PERRLA, conjunctiva and sclera clear  NECK: Supple, No JVD  CHEST/LUNG: bilateral rhonchi  HEART: III/VI systolic ejection murmur   ABDOMEN: distended abdomen, +BS  EXTREMITIES: + wound VAC to RLE   PSYCH: AAOx3, agitated, non-cooperative   NEUROLOGY: non-focal  SKIN: No rashes or lesions                        10.1   7.75  )-----------( 376      ( 30 Sep 2021 06:37 )             30.9     09-30    145  |  109<H>  |  30<H>  ----------------------------<  117<H>  4.0   |  28  |  1.66<H>    Ca    8.7      30 Sep 2021 06:37  Phos  3.0     09-30  Mg     2.6     09-30        CARDIAC MARKERS ( 29 Sep 2021 08:09 )  1.050 ng/mL / x     / 45 U/L / x     / 1.0 ng/mL          I&O's Summary    29 Sep 2021 07:01  -  30 Sep 2021 07:00  --------------------------------------------------------  IN: 190 mL / OUT: 500 mL / NET: -310 mL            CAPILLARY BLOOD GLUCOSE      RADIOLOGY & ADDITIONAL TESTS:

## 2021-09-30 NOTE — PROGRESS NOTE ADULT - PROBLEM SELECTOR PLAN 2
patient had reflux with lactulose and likely aspirated  CXR with consolidation on RLL  already covered on Levaquin and Unasyn  monitor respiratory status  SLP recommending soft diet with thin liquids, Soft/Thin (No foods that may cause reflux)  add ensure as patient with low caloric intake

## 2021-09-30 NOTE — PROGRESS NOTE ADULT - PROBLEM SELECTOR PLAN 5
patient not voiding for last 24 hours  possible cause of ARF  RBUS with hypoechoic kidneys and 500 mL bladder  s/p ibrahim catheter   c/w flomax  monitor UO and Cr, if improving may attempt TOV in a few days  Nephrology dr. Rodriguez patient not voiding for last 24 hours  possible cause of ARF  RBUS with hypoechoic kidneys and 500 mL bladder  s/p ibrahim catheter   c/w flomax  monitor UO and Cr, if improving may attempt TOV in a few days  start finasteride daily  Nephrology dr. Rodriguez

## 2021-09-30 NOTE — PROGRESS NOTE ADULT - ASSESSMENT
A:   s/p R 5th ray resection - 8/19  s/p R 5th wound debridement, graft application, bone biopsy and wound vac application 9/22     P:  Patient evaluated, chart reviewed  Xrays reviewed  ESR/CRP reviewed   MRI reviewed   Intra-op culture - NGTD  intra-op bone biopsy - NGTD  Wound Vac was changed yesterday (9/29) and to be left intact till tomorrow- adaptic/ wound vac. Adaptic applied to dorsal foot wound. Dressed with webril/ACE   Recommend elevation of b/l legs   Applied CAIR boots, recommend cair boots at all times to prevent breakdown   Continue local wound care to allow graft incorporation  Appreciate ID consult- continue abx per ID   Podiatry will follow while in house  Discussed with Dr. Vazquez

## 2021-09-30 NOTE — PROGRESS NOTE ADULT - ASSESSMENT
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 s/p OR debridement today. Nephrology consulted for abrupt rise in SCr.     1. OREN- likely ATN in the setting of infection and ACEi use. Lisinopril discontinued 9/22. ATN resolving.  s/p Lasix 80mg IV  on 9/27 for pulmonary edema/ SOB.   Kidney/ Bladder US with large distended bladder s/p ibrahim placement on 9/23; renal function did not improve post ibrahim; less likely due to obstructive uropathy. UA with 30 protein, trace blood with small LE, Check UCX to r/o UTI.   FeNa 0.72% and FeUrea 19.39%; consistent with pre-renal OREN. However renal function worsening on IVF. No peripheral eosinophilia- no signs of AIN. C3 & C4 wnl with neg ASO- no signs of PIGN. Strict I/Os. Avoid nephrotoxins/ NSAIDs/ RCA. Monitor BMP.  2. 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 Will defer secondary w/u as an outpt. Avoid nephrotoxins  3. HTN 2/2 CKD- BP acceptable. Lisinopril d/c due to OREN. c/w Nifedipine ER 60mg PO qd. Monitor BP  4. Acute osteomyelitis- s/p debridement 9/22. Pt on Unasyn can increase frequency of dose to q6hrs and pt on levaquin, can increase frequency to daily dosing since OREN is resolving. Plan as per ID      [facility]  Bryn Johnson M.D.  Domingo Booth D.O.  Zakia Rodriguez M.D.  Zonia Adams, MSN, ANP-C  [phone]    [address] [place] [address]

## 2021-09-30 NOTE — PROGRESS NOTE ADULT - SUBJECTIVE AND OBJECTIVE BOX
Podiatry Interval HPI: Pt seen bedside resting comfortably. Pt denies any overnight acute events. Denies pain to R foot. Denies constitutional symptoms No other pedal complaints.      Podiatry HPI: 76 year old male with a PMHx of DM, HTN, HLD, CAD to ED presents to the ED for a Right Foot s/p 5th foot partial ray resection and fillet toe flap. Patient states that he has sharp localized non-radiating pain to the Right foot 5th metatarsal. States that after his surgery, he did not see a podiatrist and he came into the ED because he saw drainage and was having pain. Denies any constitutional symptoms of N/V/C/F/SOB. Denies any other pedal complaints at this time. Denies calf pain today, bilaterally.        Medications ampicillin/sulbactam  IVPB 3 Gram(s) IV Intermittent every 12 hours  aspirin  chewable 81 milliGRAM(s) Oral daily  atorvastatin 80 milliGRAM(s) Oral at bedtime  bisacodyl Suppository 10 milliGRAM(s) Rectal once  cyanocobalamin 1000 MICROGram(s) Oral daily  ergocalciferol 81288 Unit(s) Oral <User Schedule>  ferrous    sulfate 325 milliGRAM(s) Oral daily  fluconAZOLE IVPB      fluconAZOLE IVPB 100 milliGRAM(s) IV Intermittent every 24 hours  heparin   Injectable 5000 Unit(s) SubCutaneous every 8 hours  influenza   Vaccine 0.5 milliLiter(s) IntraMuscular once  insulin lispro (ADMELOG) corrective regimen sliding scale   SubCutaneous Before meals and at bedtime  levoFLOXacin  Tablet 250 milliGRAM(s) Oral every 48 hours  metoprolol succinate  milliGRAM(s) Oral daily  NIFEdipine XL 60 milliGRAM(s) Oral daily  ondansetron Injectable 4 milliGRAM(s) IV Push three times a day PRN  pantoprazole  Injectable 40 milliGRAM(s) IV Push two times a day  polyethylene glycol 3350 17 Gram(s) Oral daily  senna 2 Tablet(s) Oral at bedtime  tamsulosin 0.4 milliGRAM(s) Oral at bedtime    FHFamily history of acute myocardial infarction (Father)    Family history of diabetes mellitus (Mother, Sibling)    Family history of hypertension (Mother, Sibling)    Family history of hyperlipidemia (Mother, Sibling)    ,   PMHHTN (hypertension)    HLD (hyperlipidemia)    DM (diabetes mellitus)    CAD (coronary artery disease)    Glaucoma, angle-closure    [place] (hard of hearing)       PSHNo significant past surgical history    S/P CABG (coronary artery bypass graft)    History of thyroid surgery        Labs                          10.1   7.75  )-----------( 376      ( 30 Sep 2021 06:37 )             30.9      09-30    145  |  109<H>  |  30<H>  ----------------------------<  117<H>  4.0   |  28  |  1.66<H>    Ca    8.7      30 Sep 2021 06:37  Phos  3.0     09-30  Mg     2.6     09-30       Vital Signs Last 24 Hrs  T(C): 36.6 (30 Sep 2021 07:45), Max: 37 (29 Sep 2021 19:47)  T(F): 97.8 (30 Sep 2021 07:45), Max: 98.6 (29 Sep 2021 19:47)  HR: 72 (30 Sep 2021 07:45) (72 - 79)  BP: 124/58 (30 Sep 2021 07:45) (117/77 - 144/72)  BP(mean): --  RR: 18 (30 Sep 2021 07:45) (18 - 18)  SpO2: 93% (30 Sep 2021 07:45) (92% - 98%)  Sedimentation Rate, Erythrocyte: 77 mm/Hr (09-16-21 @ 16:03)  Sedimentation Rate, Erythrocyte: 91 mm/Hr (08-22-21 @ 15:41)         C-Reactive Protein, Serum: 45 mg/L (09-16-21 @ 23:33)  C-Reactive Protein, Serum: 85 mg/L (08-22-21 @ 21:30)  C-Reactive Protein, Serum: 67 mg/L (08-15-21 @ 11:55)   WBC Count: 7.75 K/uL (09-30-21 @ 06:37)    PHYSICAL EXAM (Unable to conduct a thorough physical examination today, physical examination per last time)  LE Focused:    Vasc:  DP/PT pulses were faintly palpable, bilateral. DP/PT pulses were monophasic on doppler, bilaterally. CFT<3 seconds to all digits.   Derm: Surgical site with graft in place to R 5th ray, granular wound bed through the graft, minimal drainage or discharge. minimal erythema and edema noted, eschar forming over the wound. Dorsal foot wound noted- mild erythema and edema noted. DTI noted to b/l heel  Neuro: Protective sensation grossly diminished, b/l  MSK: 5/5 muscle strength noted to the pedal compartments. 5th digit amputation R foot    Imaging:  INTERPRETATION:  Postop, rule out infection.    3 views right foot. Prior 8/20/2021.     Status post resection of the fifth toe and distal fifth metatarsal unchanged in appearance. No focal bone lysis or unusual periosteal reaction to suggest osteomyelitis. No acute fracture or dislocation. The remainder study unchanged. No soft tissue gas.    IMPRESSION: No acute finding. No plain film evidence of osteomyelitis.        MRI R foot:     INTERPRETATION:  Clinical Information: Recent fifth metatarsal head resection now with fifth digit pain, swelling and foul discharge.    Comparison: Radiographs of the right foot from 9/16/2021 and MRI the right foot from 8/16/2021.    Technique:  MRI of the right midfoot and forefoot.  Intravenous Contrast: None.    Findings:    There is a resection at the mid aspect of the fifth metatarsal shaft. There is a lateral soft tissue wound beginning at the level of the amputation which extends distally. There is susceptibility artifact in the region of the amputation consistent with postoperative change. There is hyperintense T2 marrow signal throughout the remaining fifth metatarsal and within the adjacent fourth metatarsal head which is nonspecific and could be related to recent postoperative changes although osteomyelitis is suspected.    There is edema and mild atrophy within the plantar muscles of the foot. There is minimal spurring at the first metatarsophalangeal and hallux sesamoid articulations.    Impression:  Resection at the mid aspect of the fifth metatarsal. Lateral soft tissue wound with marrow signal abnormality throughout the fifth metatarsal and within the adjacent fourth metatarsal head which is nonspecific in the setting of recent surgery although osteomyelitis is suspected.      Culture Results:   Rare Aeromonas hydrophila/caviae   Few Klebsiella oxytoca/Raoutella ornithinolytica   Few Enterococcus faecalis   Few Streptococcus agalactiae (Group B) isolated   Group B streptococci are susceptible to ampicillin,   penicillin and cefazolin, but may be resistant to   erythromycin and clindamycin.   Recommendations for intrapartum prophylaxis for Group B   streptococci are penicillin or ampicillin. (09.16.21 @ 21:42)     Gram Stain:   No polymorphonuclear cells seen per low power field   No organisms seen per oil power field (09.22.21 @ 13:54)       Historical Values  Gram Stain:   No polymorphonuclear cells seen per low power field   No organisms seen per oil power field (09.22.21 @ 13:54)   Gram Stain:   No polymorphonuclear leukocytes seen per low power field   No organisms seen per oil power field (08.20.21 @ 03:59)        Podiatry Interval HPI: Pt seen bedside resting comfortably. Pt denies any overnight acute events. Denies pain to R foot. Denies constitutional symptoms No other pedal complaints.    Podiatry HPI: 76 year old male with a PMHx of DM, HTN, HLD, CAD to ED presents to the ED for a Right Foot s/p 5th foot partial ray resection and fillet toe flap. Patient states that he has sharp localized non-radiating pain to the Right foot 5th metatarsal. States that after his surgery, he did not see a podiatrist and he came into the ED because he saw drainage and was having pain. Denies any constitutional symptoms of N/V/C/F/SOB. Denies any other pedal complaints at this time. Denies calf pain today, bilaterally.    Medications ampicillin/sulbactam  IVPB 3 Gram(s) IV Intermittent every 6 hours  aspirin  chewable 81 milliGRAM(s) Oral daily  atorvastatin 80 milliGRAM(s) Oral at bedtime  bisacodyl Suppository 10 milliGRAM(s) Rectal once  cyanocobalamin 1000 MICROGram(s) Oral daily  ergocalciferol 56243 Unit(s) Oral <User Schedule>  ferrous    sulfate 325 milliGRAM(s) Oral daily  finasteride 5 milliGRAM(s) Oral daily  fluconAZOLE IVPB      fluconAZOLE IVPB 100 milliGRAM(s) IV Intermittent every 24 hours  heparin   Injectable 5000 Unit(s) SubCutaneous every 8 hours  influenza   Vaccine 0.5 milliLiter(s) IntraMuscular once  insulin lispro (ADMELOG) corrective regimen sliding scale   SubCutaneous Before meals and at bedtime  levoFLOXacin  Tablet 250 milliGRAM(s) Oral every 24 hours  metoprolol succinate  milliGRAM(s) Oral daily  NIFEdipine XL 60 milliGRAM(s) Oral daily  ondansetron Injectable 4 milliGRAM(s) IV Push three times a day PRN  pantoprazole  Injectable 40 milliGRAM(s) IV Push two times a day  polyethylene glycol 3350 17 Gram(s) Oral daily  senna 2 Tablet(s) Oral at bedtime  tamsulosin 0.4 milliGRAM(s) Oral at bedtime    FHFamily history of acute myocardial infarction (Father)    Family history of diabetes mellitus (Mother, Sibling)    Family history of hypertension (Mother, Sibling)    Family history of hyperlipidemia (Mother, Sibling)    ,   PMHHTN (hypertension)    HLD (hyperlipidemia)    DM (diabetes mellitus)    CAD (coronary artery disease)    Glaucoma, angle-closure    [place] (hard of hearing)       PSHNo significant past surgical history    S/P CABG (coronary artery bypass graft)    History of thyroid surgery        Labs                          10.1   7.75  )-----------( 376      ( 30 Sep 2021 06:37 )             30.9      09-30    145  |  109<H>  |  30<H>  ----------------------------<  117<H>  4.0   |  28  |  1.66<H>    Ca    8.7      30 Sep 2021 06:37  Phos  3.0     09-30  Mg     2.6     09-30       Vital Signs Last 24 Hrs  T(C): 36.7 (30 Sep 2021 19:48), Max: 36.8 (29 Sep 2021 23:43)  T(F): 98 (30 Sep 2021 19:48), Max: 98.2 (29 Sep 2021 23:43)  HR: 73 (30 Sep 2021 19:48) (72 - 79)  BP: 125/64 (30 Sep 2021 19:48) (123/52 - 129/59)  BP(mean): --  RR: 19 (30 Sep 2021 19:48) (18 - 19)  SpO2: 93% (30 Sep 2021 19:48) (93% - 98%)  Sedimentation Rate, Erythrocyte: 77 mm/Hr (09-16-21 @ 16:03)  Sedimentation Rate, Erythrocyte: 91 mm/Hr (08-22-21 @ 15:41)         C-Reactive Protein, Serum: 45 mg/L (09-16-21 @ 23:33)  C-Reactive Protein, Serum: 85 mg/L (08-22-21 @ 21:30)  C-Reactive Protein, Serum: 67 mg/L (08-15-21 @ 11:55)   WBC Count: 7.75 K/uL (09-30-21 @ 06:37)    PHYSICAL EXAM (Unable to conduct a thorough physical examination today, physical examination per last time)  LE Focused:    Vasc:  DP/PT pulses were faintly palpable, bilateral. DP/PT pulses were monophasic on doppler, bilaterally. CFT<3 seconds to all digits.   Derm: Surgical site with graft in place to R 5th ray, granular wound bed through the graft, minimal drainage or discharge. minimal erythema and edema noted, eschar forming over the wound. Dorsal foot wound noted- mild erythema and edema noted. DTI noted to b/l heel  Neuro: Protective sensation grossly diminished, b/l  MSK: 5/5 muscle strength noted to the pedal compartments. 5th digit amputation R foot    Imaging:  INTERPRETATION:  Postop, rule out infection.    3 views right foot. Prior 8/20/2021.     Status post resection of the fifth toe and distal fifth metatarsal unchanged in appearance. No focal bone lysis or unusual periosteal reaction to suggest osteomyelitis. No acute fracture or dislocation. The remainder study unchanged. No soft tissue gas.    IMPRESSION: No acute finding. No plain film evidence of osteomyelitis.        MRI R foot:     INTERPRETATION:  Clinical Information: Recent fifth metatarsal head resection now with fifth digit pain, swelling and foul discharge.    Comparison: Radiographs of the right foot from 9/16/2021 and MRI the right foot from 8/16/2021.    Technique:  MRI of the right midfoot and forefoot.  Intravenous Contrast: None.    Findings:    There is a resection at the mid aspect of the fifth metatarsal shaft. There is a lateral soft tissue wound beginning at the level of the amputation which extends distally. There is susceptibility artifact in the region of the amputation consistent with postoperative change. There is hyperintense T2 marrow signal throughout the remaining fifth metatarsal and within the adjacent fourth metatarsal head which is nonspecific and could be related to recent postoperative changes although osteomyelitis is suspected.    There is edema and mild atrophy within the plantar muscles of the foot. There is minimal spurring at the first metatarsophalangeal and hallux sesamoid articulations.    Impression:  Resection at the mid aspect of the fifth metatarsal. Lateral soft tissue wound with marrow signal abnormality throughout the fifth metatarsal and within the adjacent fourth metatarsal head which is nonspecific in the setting of recent surgery although osteomyelitis is suspected.      Culture Results:   Rare Aeromonas hydrophila/caviae   Few Klebsiella oxytoca/Raoutella ornithinolytica   Few Enterococcus faecalis   Few Streptococcus agalactiae (Group B) isolated   Group B streptococci are susceptible to ampicillin,   penicillin and cefazolin, but may be resistant to   erythromycin and clindamycin.   Recommendations for intrapartum prophylaxis for Group B   streptococci are penicillin or ampicillin. (09.16.21 @ 21:42)     Gram Stain:   No polymorphonuclear cells seen per low power field   No organisms seen per oil power field (09.22.21 @ 13:54)       Historical Values  Gram Stain:   No polymorphonuclear cells seen per low power field   No organisms seen per oil power field (09.22.21 @ 13:54)   Gram Stain:   No polymorphonuclear leukocytes seen per low power field   No organisms seen per oil power field (08.20.21 @ 03:59)

## 2021-09-30 NOTE — PROGRESS NOTE ADULT - PROBLEM SELECTOR PLAN 8
CAD s/p CABG, severe aortic stenosis --> patient will need TAVR, SABIHA after infectious workup is complete  continue ASA, lipitor, BB, ACEi  EF 55-60%, GIDD   Cardio: Dr. Wilkins - recommends SABIHA and L/R cath after treatment of infection and medical optimization  D/C telemetry

## 2021-09-30 NOTE — PROGRESS NOTE ADULT - SUBJECTIVE AND OBJECTIVE BOX
C A R D I O L O G Y  **********************************     DATE OF SERVICE: 09-30-21    Patient denies chest pain breathing is comfortable, no orthopnea.   Review of symptoms otherwise negative.    ampicillin/sulbactam  IVPB 3 Gram(s) IV Intermittent every 12 hours  aspirin  chewable 81 milliGRAM(s) Oral daily  atorvastatin 80 milliGRAM(s) Oral at bedtime  bisacodyl Suppository 10 milliGRAM(s) Rectal once  cyanocobalamin 1000 MICROGram(s) Oral daily  ergocalciferol 41882 Unit(s) Oral <User Schedule>  ferrous    sulfate 325 milliGRAM(s) Oral daily  fluconAZOLE IVPB      fluconAZOLE IVPB 100 milliGRAM(s) IV Intermittent every 24 hours  heparin   Injectable 5000 Unit(s) SubCutaneous every 8 hours  influenza   Vaccine 0.5 milliLiter(s) IntraMuscular once  insulin lispro (ADMELOG) corrective regimen sliding scale   SubCutaneous Before meals and at bedtime  levoFLOXacin  Tablet 250 milliGRAM(s) Oral every 48 hours  metoprolol succinate  milliGRAM(s) Oral daily  NIFEdipine XL 60 milliGRAM(s) Oral daily  ondansetron Injectable 4 milliGRAM(s) IV Push three times a day PRN  pantoprazole  Injectable 40 milliGRAM(s) IV Push two times a day  polyethylene glycol 3350 17 Gram(s) Oral daily  senna 2 Tablet(s) Oral at bedtime  tamsulosin 0.4 milliGRAM(s) Oral at bedtime                            10.1   7.75  )-----------( 376      ( 30 Sep 2021 06:37 )             30.9       Hemoglobin: 10.1 g/dL (09-30 @ 06:37)  Hemoglobin: 10.3 g/dL (09-29 @ 08:09)  Hemoglobin: 9.7 g/dL (09-28 @ 07:39)  Hemoglobin: 9.6 g/dL (09-27 @ 06:17)  Hemoglobin: 8.9 g/dL (09-26 @ 07:23)      09-30    145  |  109<H>  |  30<H>  ----------------------------<  117<H>  4.0   |  28  |  1.66<H>    Ca    8.7      30 Sep 2021 06:37  Phos  3.0     09-30  Mg     2.6     09-30      Creatinine Trend: 1.66<--, 1.73<--, 2.24<--, 3.19<--, 3.83<--, 4.05<--    COAGS:     CARDIAC MARKERS ( 29 Sep 2021 08:09 )  1.050 ng/mL / x     / 45 U/L / x     / 1.0 ng/mL  CARDIAC MARKERS ( 28 Sep 2021 07:39 )  1.510 ng/mL / x     / 56 U/L / x     / 1.0 ng/mL  CARDIAC MARKERS ( 27 Sep 2021 19:41 )  1.220 ng/mL / x     / x     / x     / x            T(C): 36.4 (09-30-21 @ 11:02), Max: 37 (09-29-21 @ 19:47)  HR: 74 (09-30-21 @ 11:02) (72 - 79)  BP: 125/57 (09-30-21 @ 11:02) (117/77 - 129/59)  RR: 19 (09-30-21 @ 11:02) (18 - 19)  SpO2: 94% (09-30-21 @ 11:02) (92% - 98%)  Wt(kg): --    I&O's Summary    29 Sep 2021 07:01  -  30 Sep 2021 07:00  --------------------------------------------------------  IN: 190 mL / OUT: 500 mL / NET: -310 mL        HEENT:  (-)icterus (-)pallor  CV: N S1 S2 1/6 TRINIDAD (+)2 Pulses B/l  Resp:  Clear to ausculatation B/L, normal effort  GI: (+) BS Soft, NT, ND  Lymph:  (-)Edema, (-)obvious lymphadenopathy  Skin: Warm to touch, Normal turgor  Psych: Appropriate mood and affect      ASSESSMENT/PLAN: 	76y  Male PMH DM on insulin, HTN, HLD, normal lV fx moderate to severe AS 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    - keep net negative  - Resolving ATN  - Podiatry f/u noted  - Abx per primary team  - Positive trop noted, likely due to increased demand and renal failure.  - He will need a SABIHA and R+L heart cath once he is medically optimized and infection treated  - Cont medical management of CAD      Zak Wilkins MD, [facility]  BEEPER [phone]

## 2021-09-30 NOTE — PROGRESS NOTE ADULT - PROBLEM SELECTOR PLAN 1
repeat CXR this AM with pulm edema  f/u EKG, troponin, proBNP, ABG  Lasix 80 mg x 1   monitor respiratory status  Cardiology consulted , Franky  Nephro consulted Dr. Rodriguez  tight I's and O's, daily weights Today -3.2 L out  ibrahim catheter for close UO monitoring repeat CXR this AM with pulm edema  f/u EKG, troponin, proBNP, ABG  Lasix 80 mg x 1   monitor respiratory status  Cardiology consulted , Franky  Nephro consulted Dr. Rodriguez  tight I's and O's, daily weights Today -3.2 L out  ibrahim catheter for close UO monitoring    will give IV lasix 40 mg now and follow up CXR tomorrow AM repeat CXR this AM with pulm edema  f/u EKG, troponin, proBNP, ABG  Lasix 80 mg x 1   monitor respiratory status  Cardiology consulted Dr, Franky  Nephro consulted Dr. Rodriguez  tight I's and O's, daily weights Today -3.2 L out  ibrahim catheter for close UO monitoring    will give IV lasix 40 mg now and follow up CXR tomorrow AM    GOC discussion with niurka ESCALANTE who states that patient would like to be full code

## 2021-09-30 NOTE — PROGRESS NOTE ADULT - SUBJECTIVE AND OBJECTIVE BOX
Patient seen and examined at bedside  Admits to flatus/BM.   Denies pain, nausea/ vomiting.   Tolerating diet.    Vital Signs Last 24 Hrs  T(F): 97.8 (09-30-21 @ 07:45), Max: 98.6 (09-29-21 @ 19:47)  HR: 72 (09-30-21 @ 07:45)  BP: 124/58 (09-30-21 @ 07:45)  RR: 18 (09-30-21 @ 07:45)  SpO2: 93% (09-30-21 @ 07:45)  POCT Blood Glucose.: 121 mg/dL (30 Sep 2021 08:18)    GENERAL: Alert, NAD  CHEST/LUNG:  respirations nonlabored  ABDOMEN: soft, Nontender, mild distention, tmypanic  EXTREMITIES:  no calf tenderness, No edema    I&O's Detail    29 Sep 2021 07:01  -  30 Sep 2021 07:00  --------------------------------------------------------  IN:    Oral Fluid: 190 mL  Total IN: 190 mL    OUT:    Indwelling Catheter - Urethral (mL): 500 mL  Total OUT: 500 mL    Total NET: -310 mL    LABS:                        10.1   7.75  )-----------( 376      ( 30 Sep 2021 06:37 )             30.9     09-30    145  |  109<H>  |  30<H>  ----------------------------<  117<H>  4.0   |  28  |  1.66<H>    Ca    8.7      30 Sep 2021 06:37  Phos  3.0     09-30  Mg     2.6     09-30

## 2021-10-01 DIAGNOSIS — B37.49 OTHER UROGENITAL CANDIDIASIS: ICD-10-CM

## 2021-10-01 LAB
ANION GAP SERPL CALC-SCNC: 11 MMOL/L — SIGNIFICANT CHANGE UP (ref 5–17)
BUN SERPL-MCNC: 23 MG/DL — HIGH (ref 7–18)
CALCIUM SERPL-MCNC: 8.4 MG/DL — SIGNIFICANT CHANGE UP (ref 8.4–10.5)
CHLORIDE SERPL-SCNC: 107 MMOL/L — SIGNIFICANT CHANGE UP (ref 96–108)
CO2 SERPL-SCNC: 27 MMOL/L — SIGNIFICANT CHANGE UP (ref 22–31)
CREAT SERPL-MCNC: 1.48 MG/DL — HIGH (ref 0.5–1.3)
GLUCOSE BLDC GLUCOMTR-MCNC: 117 MG/DL — HIGH (ref 70–99)
GLUCOSE BLDC GLUCOMTR-MCNC: 129 MG/DL — HIGH (ref 70–99)
GLUCOSE BLDC GLUCOMTR-MCNC: 147 MG/DL — HIGH (ref 70–99)
GLUCOSE SERPL-MCNC: 109 MG/DL — HIGH (ref 70–99)
HCT VFR BLD CALC: 28.3 % — LOW (ref 39–50)
HGB BLD-MCNC: 9.1 G/DL — LOW (ref 13–17)
MAGNESIUM SERPL-MCNC: 2.2 MG/DL — SIGNIFICANT CHANGE UP (ref 1.6–2.6)
MCHC RBC-ENTMCNC: 29.2 PG — SIGNIFICANT CHANGE UP (ref 27–34)
MCHC RBC-ENTMCNC: 32.2 GM/DL — SIGNIFICANT CHANGE UP (ref 32–36)
MCV RBC AUTO: 90.7 FL — SIGNIFICANT CHANGE UP (ref 80–100)
NRBC # BLD: 0 /100 WBCS — SIGNIFICANT CHANGE UP (ref 0–0)
PHOSPHATE SERPL-MCNC: 2.9 MG/DL — SIGNIFICANT CHANGE UP (ref 2.5–4.5)
PLATELET # BLD AUTO: 332 K/UL — SIGNIFICANT CHANGE UP (ref 150–400)
POTASSIUM SERPL-MCNC: 3.4 MMOL/L — LOW (ref 3.5–5.3)
POTASSIUM SERPL-SCNC: 3.4 MMOL/L — LOW (ref 3.5–5.3)
RBC # BLD: 3.12 M/UL — LOW (ref 4.2–5.8)
RBC # FLD: 14.1 % — SIGNIFICANT CHANGE UP (ref 10.3–14.5)
SODIUM SERPL-SCNC: 145 MMOL/L — SIGNIFICANT CHANGE UP (ref 135–145)
WBC # BLD: 6.17 K/UL — SIGNIFICANT CHANGE UP (ref 3.8–10.5)
WBC # FLD AUTO: 6.17 K/UL — SIGNIFICANT CHANGE UP (ref 3.8–10.5)

## 2021-10-01 PROCEDURE — 77001 FLUOROGUIDE FOR VEIN DEVICE: CPT | Mod: 26,59

## 2021-10-01 PROCEDURE — 76937 US GUIDE VASCULAR ACCESS: CPT | Mod: 26,59

## 2021-10-01 PROCEDURE — 36573 INSJ PICC RS&I 5 YR+: CPT

## 2021-10-01 PROCEDURE — 71045 X-RAY EXAM CHEST 1 VIEW: CPT | Mod: 26

## 2021-10-01 RX ORDER — PANTOPRAZOLE SODIUM 20 MG/1
1 TABLET, DELAYED RELEASE ORAL
Qty: 0 | Refills: 0 | DISCHARGE
Start: 2021-10-01

## 2021-10-01 RX ORDER — TAMSULOSIN HYDROCHLORIDE 0.4 MG/1
1 CAPSULE ORAL
Qty: 0 | Refills: 0 | DISCHARGE
Start: 2021-10-01

## 2021-10-01 RX ORDER — FINASTERIDE 5 MG/1
1 TABLET, FILM COATED ORAL
Qty: 0 | Refills: 0 | DISCHARGE
Start: 2021-10-01

## 2021-10-01 RX ORDER — SENNA PLUS 8.6 MG/1
2 TABLET ORAL
Qty: 0 | Refills: 0 | DISCHARGE
Start: 2021-10-01

## 2021-10-01 RX ORDER — POLYETHYLENE GLYCOL 3350 17 G/17G
17 POWDER, FOR SOLUTION ORAL
Qty: 0 | Refills: 0 | DISCHARGE
Start: 2021-10-01

## 2021-10-01 RX ORDER — SODIUM CHLORIDE 9 MG/ML
10 INJECTION INTRAMUSCULAR; INTRAVENOUS; SUBCUTANEOUS
Refills: 0 | Status: DISCONTINUED | OUTPATIENT
Start: 2021-10-01 | End: 2021-11-03

## 2021-10-01 RX ORDER — FUROSEMIDE 40 MG
60 TABLET ORAL ONCE
Refills: 0 | Status: COMPLETED | OUTPATIENT
Start: 2021-10-01 | End: 2021-10-01

## 2021-10-01 RX ORDER — POLYETHYLENE GLYCOL 3350 17 G/17G
17 POWDER, FOR SOLUTION ORAL DAILY
Refills: 0 | Status: DISCONTINUED | OUTPATIENT
Start: 2021-10-01 | End: 2021-10-08

## 2021-10-01 RX ORDER — CHLORHEXIDINE GLUCONATE 213 G/1000ML
1 SOLUTION TOPICAL
Refills: 0 | Status: DISCONTINUED | OUTPATIENT
Start: 2021-10-01 | End: 2021-10-24

## 2021-10-01 RX ORDER — PANTOPRAZOLE SODIUM 20 MG/1
40 TABLET, DELAYED RELEASE ORAL
Refills: 0 | Status: DISCONTINUED | OUTPATIENT
Start: 2021-10-01 | End: 2021-10-07

## 2021-10-01 RX ORDER — POTASSIUM CHLORIDE 20 MEQ
40 PACKET (EA) ORAL ONCE
Refills: 0 | Status: COMPLETED | OUTPATIENT
Start: 2021-10-01 | End: 2021-10-01

## 2021-10-01 RX ADMIN — Medication 325 MILLIGRAM(S): at 12:09

## 2021-10-01 RX ADMIN — Medication 81 MILLIGRAM(S): at 12:09

## 2021-10-01 RX ADMIN — FLUCONAZOLE 100 MILLIGRAM(S): 150 TABLET ORAL at 20:44

## 2021-10-01 RX ADMIN — AMPICILLIN SODIUM AND SULBACTAM SODIUM 200 GRAM(S): 250; 125 INJECTION, POWDER, FOR SUSPENSION INTRAMUSCULAR; INTRAVENOUS at 12:09

## 2021-10-01 RX ADMIN — HEPARIN SODIUM 5000 UNIT(S): 5000 INJECTION INTRAVENOUS; SUBCUTANEOUS at 06:11

## 2021-10-01 RX ADMIN — AMPICILLIN SODIUM AND SULBACTAM SODIUM 200 GRAM(S): 250; 125 INJECTION, POWDER, FOR SUSPENSION INTRAMUSCULAR; INTRAVENOUS at 17:22

## 2021-10-01 RX ADMIN — PANTOPRAZOLE SODIUM 40 MILLIGRAM(S): 20 TABLET, DELAYED RELEASE ORAL at 06:36

## 2021-10-01 RX ADMIN — PREGABALIN 1000 MICROGRAM(S): 225 CAPSULE ORAL at 12:09

## 2021-10-01 RX ADMIN — HEPARIN SODIUM 5000 UNIT(S): 5000 INJECTION INTRAVENOUS; SUBCUTANEOUS at 21:21

## 2021-10-01 RX ADMIN — Medication 40 MILLIEQUIVALENT(S): at 12:10

## 2021-10-01 RX ADMIN — Medication 60 MILLIGRAM(S): at 15:09

## 2021-10-01 RX ADMIN — Medication 100 MILLIGRAM(S): at 06:35

## 2021-10-01 RX ADMIN — POLYETHYLENE GLYCOL 3350 17 GRAM(S): 17 POWDER, FOR SOLUTION ORAL at 12:09

## 2021-10-01 RX ADMIN — ATORVASTATIN CALCIUM 80 MILLIGRAM(S): 80 TABLET, FILM COATED ORAL at 21:21

## 2021-10-01 RX ADMIN — Medication 60 MILLIGRAM(S): at 06:11

## 2021-10-01 RX ADMIN — CHLORHEXIDINE GLUCONATE 1 APPLICATION(S): 213 SOLUTION TOPICAL at 17:22

## 2021-10-01 RX ADMIN — SENNA PLUS 2 TABLET(S): 8.6 TABLET ORAL at 21:21

## 2021-10-01 RX ADMIN — TAMSULOSIN HYDROCHLORIDE 0.4 MILLIGRAM(S): 0.4 CAPSULE ORAL at 21:21

## 2021-10-01 RX ADMIN — HEPARIN SODIUM 5000 UNIT(S): 5000 INJECTION INTRAVENOUS; SUBCUTANEOUS at 13:05

## 2021-10-01 RX ADMIN — FINASTERIDE 5 MILLIGRAM(S): 5 TABLET, FILM COATED ORAL at 12:53

## 2021-10-01 RX ADMIN — AMPICILLIN SODIUM AND SULBACTAM SODIUM 200 GRAM(S): 250; 125 INJECTION, POWDER, FOR SUSPENSION INTRAMUSCULAR; INTRAVENOUS at 06:11

## 2021-10-01 NOTE — PROGRESS NOTE ADULT - PROBLEM SELECTOR PLAN 2
likely to moderate to severe AS  tronopin elevation likely to ischemic demand/ renal failure- mixed etiology  Cardiology recommending SABIHA and R+L heart cath/ TAVR once he is medically optimized and infection treated  f/u chest x-ray for progression of PE- if worsening will need Lasix  Cardiology , Franky  Nephro Dr. Rodriguez likely to moderate to severe AS  tronopin elevation likely to ischemic demand/ renal failure- mixed etiology  Cardiology recommending SABIHA and R+L heart cath/ TAVR once he is medically optimized and infection treated  f/u chest x-ray for progression of PE- if worsening will need Lasix  Lasix 60mg IV x1 now  f/u chest x-ray in AM  Cardiology Franky Fraire  Nephro Dr. Rodriguez

## 2021-10-01 NOTE — PROGRESS NOTE ADULT - SUBJECTIVE AND OBJECTIVE BOX
`Patient is a 76y old  Male who presents with a chief complaint of R Foot Pain (01 Oct 2021 17:34)    PATIENT IS SEEN AND EXAMINED IN MEDICAL FLOOR.  KEENANT [    ]    MADDY [   ]      GT [   ]    ALLERGIES:  No Known Allergies      Daily     Daily     VITALS:    Vital Signs Last 24 Hrs  T(C): 36.1 (01 Oct 2021 21:08), Max: 37.6 (01 Oct 2021 05:00)  T(F): 97 (01 Oct 2021 21:08), Max: 99.6 (01 Oct 2021 05:00)  HR: 64 (01 Oct 2021 21:08) (64 - 79)  BP: 122/57 (01 Oct 2021 21:08) (112/53 - 133/59)  BP(mean): --  RR: 17 (01 Oct 2021 21:08) (17 - 20)  SpO2: 95% (01 Oct 2021 21:08) (92% - 95%)    LABS:    CBC Full  -  ( 01 Oct 2021 06:35 )  WBC Count : 6.17 K/uL  RBC Count : 3.12 M/uL  Hemoglobin : 9.1 g/dL  Hematocrit : 28.3 %  Platelet Count - Automated : 332 K/uL  Mean Cell Volume : 90.7 fl  Mean Cell Hemoglobin : 29.2 pg  Mean Cell Hemoglobin Concentration : 32.2 gm/dL  Auto Neutrophil # : x  Auto Lymphocyte # : x  Auto Monocyte # : x  Auto Eosinophil # : x  Auto Basophil # : x  Auto Neutrophil % : x  Auto Lymphocyte % : x  Auto Monocyte % : x  Auto Eosinophil % : x  Auto Basophil % : x      10-01    145  |  107  |  23<H>  ----------------------------<  109<H>  3.4<L>   |  27  |  1.48<H>    Ca    8.4      01 Oct 2021 06:35  Phos  2.9     10-01  Mg     2.2     10-01      CAPILLARY BLOOD GLUCOSE      POCT Blood Glucose.: 129 mg/dL (01 Oct 2021 21:35)  POCT Blood Glucose.: 147 mg/dL (01 Oct 2021 17:17)  POCT Blood Glucose.: 117 mg/dL (01 Oct 2021 08:05)          Creatinine Trend: 1.48<--, 1.66<--, 1.73<--, 2.24<--, 3.19<--, 3.83<--  I&O's Summary    30 Sep 2021 07:01  -  01 Oct 2021 07:00  --------------------------------------------------------  IN: 0 mL / OUT: 1600 mL / NET: -1600 mL    01 Oct 2021 07:01  -  01 Oct 2021 22:11  --------------------------------------------------------  IN: 0 mL / OUT: 440 mL / NET: -440 mL            .Tissue Right 5th toe r/o osteomyeltis  09-22 @ 13:54   No growth at 5 days  --    No polymorphonuclear cells seen per low power field  No organisms seen per oil power field      .Blood Blood-Venous  09-17 @ 10:28   No Growth Final  --  --      .Surgical Swab right foot wound  09-16 @ 21:42   Culture yields >4 types of aerobic and/or anaerobic bacteria  Call client services within 7 days if further workup is clinically  indicated. Culture includes  Rare Aeromonas hydrophila/caviae  Few Klebsiella oxytoca/Raoutella ornithinolytica  Few Enterococcus faecalis  Few Streptococcus agalactiae (Group B) isolated  Group B streptococci are susceptible to ampicillin,  penicillin and cefazolin, but may be resistant to  erythromycin and clindamycin.  Recommendations for intrapartum prophylaxis for Group B  streptococci are penicillin or ampicillin.  --  Aeromonas hydrophila/caviae  Klebsiella oxytoca /Raoutella ornithinolytica  Enterococcus faecalis      Bone R 5th metatarsal bone clean ma  08-20 @ 03:59   No growth at 5 days  --    No polymorphonuclear leukocytes seen per low power field  No organisms seen per oil power field      .Blood Blood-Venous  08-14 @ 21:09   No Growth Final  --  --      .Surgical Swab Right foot plantar wound  08-14 @ 21:08   Moderate Streptococcus agalactiae (Group B) isolated  Group B streptococci are susceptible to ampicillin,  penicillin and cefazolin, but may be resistant to  erythromycin and clindamycin.  Recommendations for intrapartum prophylaxis for Group B  streptococci are penicillin or ampicillin.  Moderate Bacteroides fragilis "Susceptibilities not performed"  Normal skin filiberto isolated  --  --          MEDICATIONS:    MEDICATIONS  (STANDING):  ampicillin/sulbactam  IVPB 3 Gram(s) IV Intermittent every 6 hours  aspirin  chewable 81 milliGRAM(s) Oral daily  atorvastatin 80 milliGRAM(s) Oral at bedtime  bisacodyl Suppository 10 milliGRAM(s) Rectal once  chlorhexidine 2% Cloths 1 Application(s) Topical <User Schedule>  cyanocobalamin 1000 MICROGram(s) Oral daily  ergocalciferol 64784 Unit(s) Oral <User Schedule>  ferrous    sulfate 325 milliGRAM(s) Oral daily  finasteride 5 milliGRAM(s) Oral daily  fluconAZOLE IVPB 100 milliGRAM(s) IV Intermittent every 24 hours  fluconAZOLE IVPB      heparin   Injectable 5000 Unit(s) SubCutaneous every 8 hours  influenza   Vaccine 0.5 milliLiter(s) IntraMuscular once  insulin lispro (ADMELOG) corrective regimen sliding scale   SubCutaneous Before meals and at bedtime  levoFLOXacin  Tablet 250 milliGRAM(s) Oral every 24 hours  metoprolol succinate  milliGRAM(s) Oral daily  NIFEdipine XL 60 milliGRAM(s) Oral daily  pantoprazole    Tablet 40 milliGRAM(s) Oral before breakfast  polyethylene glycol 3350 17 Gram(s) Oral daily  senna 2 Tablet(s) Oral at bedtime  tamsulosin 0.4 milliGRAM(s) Oral at bedtime      MEDICATIONS  (PRN):  ondansetron Injectable 4 milliGRAM(s) IV Push three times a day PRN Nausea and/or Vomiting  sodium chloride 0.9% lock flush 10 milliLiter(s) IV Push every 1 hour PRN Pre/post blood products, medications, blood draw, and to maintain line patency      REVIEW OF SYSTEMS:                           ALL ROS DONE [ X   ]    CONSTITUTIONAL:  LETHARGIC [   ], FEVER [   ], UNRESPONSIVE [   ]  CVS:  CP  [   ], SOB, [   ], PALPITATIONS [   ], DIZZYNESS [   ]  RS: COUGH [   ], SPUTUM [   ]  GI: ABDOMINAL PAIN [   ], NAUSEA [   ], VOMITINGS [   ], DIARRHEA [   ], CONSTIPATION [   ]  :  DYSURIA [   ], NOCTURIA [   ], INCREASED FREQUENCY [   ], DRIBLING [   ],  SKELETAL: PAINFUL JOINTS [   ], SWOLLEN JOINTS [   ], NECK ACHE [   ], LOW BACK ACHE [   ],  SKIN : ULCERS [   ], RASH [   ], ITCHING [   ]  CNS: HEAD ACHE [   ], DOUBLE VISION [   ], BLURRED VISION [   ], AMS / CONFUSION [   ], SEIZURES [   ], WEAKNESS [   ],TINGLING / NUMBNESS [   ]    PHYSICAL EXAMINATION:  GENERAL APPEARANCE: NO DISTRESS  HEENT:  NO PALLOR, NO  JVD,  NO   NODES, NECK SUPPLE  CVS: S1 +, S2 +,   RS: AEEB,  OCCASIONAL  RALES +,   NO RONCHI, CRACKLES +  ABD: SOFT, NT, NO, BS +     ;  DISTENDED +  EXT: NO PE  SKIN: WARM,   RIGHT FOOT WRAPPED W/ WOUND VAC IN PLACE  SKELETAL:  ROM ACCEPTABLE  CNS:  AAO X  2     RADIOLOGY :    EXAM:  CT ABDOMEN AND PELVIS OC                            PROCEDURE DATE:  09/27/2021          INTERPRETATION:  CLINICAL INFORMATION: Small bowel obstruction.    COMPARISON: None.    CONTRAST/COMPLICATIONS:  IV Contrast: NONE  Oral Contrast: Omnipaque 300  Complications: None reported at time of study completion    PROCEDURE:  CT of the Abdomen and Pelvis was performed.  Sagittal and coronal reformats were performed.    FINDINGS:  LOWER CHEST: Small bilateral pleural effusions with compressiveatelectasis of both lower lobes.    LIVER: Within normal limits.  BILE DUCTS: Normal caliber.  GALLBLADDER: Distended. Small layering gallstones.  SPLEEN: Within normal limits.  PANCREAS: Within normal limits.  ADRENALS: Within normal limits.  KIDNEYS/URETERS: No hydronephrosis. Nonobstructing left upper pole calculus, measuring 0.6 cm.    BLADDER: Urinary bladder contains air and a Castañeda catheter balloon.  REPRODUCTIVE ORGANS: Prostate is not enlarged.    BOWEL: No bowel obstruction. Appendix isnormal. Colonic diverticulosis.  PERITONEUM: Mild presacral fluid.  VESSELS: Atherosclerotic changes.  RETROPERITONEUM/LYMPH NODES: No lymphadenopathy.  ABDOMINAL WALL: Within normal limits.  BONES: Degenerative changes. Sternotomy.    IMPRESSION:  No acute intra-abdominal pathology.    Small bilateral pleural effusions with compressive atelectasis of both lower lobes.    ====================================================    EXAM:  MR FOOT RT                            PROCEDURE DATE:  09/17/2021          INTERPRETATION:  Clinical Information: Recent fifth metatarsal head resection now with fifth digit pain, swelling and foul discharge.    Comparison: Radiographs of the right foot from 9/16/2021 and MRI the right foot from 8/16/2021.    Technique:  MRI of the right midfoot and forefoot.  Intravenous Contrast: None.    Findings:    There is a resection at the mid aspect of the fifth metatarsal shaft. There is a lateral soft tissue wound beginning at the level of the amputation which extends distally. There is susceptibility artifact in the region of the amputation consistent with postoperative change. There is hyperintense T2 marrow signal throughout the remaining fifth metatarsal and within the adjacent fourth metatarsal head which is nonspecific and could be related to recent postoperative changes although osteomyelitis is suspected.    There is edema and mild atrophy within the plantar muscles of the foot. There is minimal spurring at the first metatarsophalangeal and hallux sesamoid articulations.    Impression:  Resection at the mid aspect of the fifth metatarsal. Lateral soft tissue wound with marrow signal abnormality throughout the fifth metatarsal and within the adjacent fourth metatarsal head which is nonspecific in the setting of recent surgery although osteomyelitis is suspected.        ASSESSMENT :     Headache    HTN (hypertension)    HLD (hyperlipidemia)    DM (diabetes mellitus)    CAD (coronary artery disease)    Glaucoma, angle-closure    [place] (hard of hearing)    No significant past surgical history    S/P CABG (coronary artery bypass graft)    History of thyroid surgery        PLAN:  HPI:  76M from home, lives with daughter w/ PMH DM on insulin, HTN, HLD, CAD, 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 Pt with sharp pain on the R lateral foot. As per daughter, pt was taking tylenol which did not help his pain prompting the patient to ask his daughter to take him to the hospital. Pt is a poor historian. Daughter states that the patient did not see his podiatrist after the surgery. No fever, chest, pain, palpitations, nausea, vomiting, diarrhea (17 Sep 2021 01:11)    # SUSPECT RIGHT FOOT OSTEOMYELITIS S/P RIGHT 5TH RAY RESECTION [8/19] AND S/P DEBRIDEMENT, GRAFT APPLICATION, BONE BX AND WOUND VAC APPLICATION 9/22  ** RECENT ADMISSION FOR RIGHT DIABETIC FOOT ULCER, CELLULITIS, OSTEOMYELITIS RIGHT 5th METATARSAL S/P RIGHT 5TH PARTIAL RAY AMPUTATION [8/20] - BONE PATHOLOGY W/ CLEAN MARGINS    - S/P VANCOMYCIN AND ZOSYN ; NOW ON UNASYN AND LEVAQUIN GIVEN OERN; PER ID SWITCH TO ZOSYN UNTIL 11/3  - RECENTLY HAD SIMON W/ MILD PAD  - REVIEWED WOUND CX [ ENTEROCOCCUS, AEROMONAS, KLEBSIELLA] AND BCX  - BONE BX - acute osteomyelitis and necrotic periosteal tissue.   - ID CONSULT, PODIATRY CONSULT    - PICC LINE PLACED TODAY TO COMPLETE 6 WEEKS OF ANTIBIOTICS - PER ID SWITCH TO ZOSYN UNTIL 11/3    # ACUTE HYPOXIC RESPIRATORY FAILURE SUSPECT S/T PULMONARY EDEMA IN THE SETTING OF SEVERE AORTIC STENOSIS + ASPIRATION PNA - ON LEVAQUIN, STARTED LASIX, CARDIOLOGY CONSULT IN PROGRESS  - S/P CRITICAL CARE CONSULT  - WILL GIVE LASIX [9/30]`  - REPEAT CXR IN A.M. [10/1]    # FUNGURIA - ON FLUCONAZOLE    # BOWEL DISTENTION - ? ILEUS - OPTIMIZING ELECTROLYTES - IMPROVED  - SURGERY CONSULT IN PROGRESS  - PASSED 2 BMS ON 9/27    # ASPIRATION EVENT WHEN PATIENT REMOVED NGT - ON LEVAQUIN, ID CONSULT IN PROGRESS    # CHOLELITHIASIS - TRENDING LFTS, RUQ U/S - CHOLELITHIASIS, SURGERY CONSULT IN PROGRESS  - GI CONSULT IN PROGRESS - LESS SUSPICIOUS FOR CHOLECYSTITIS    # ? DYSPEPSIA - PLACED ON PPI BID, IMPROVED, UNDERWENT ST EVAL - TOLERATING LIQUID DIET TODAY    # ELEVATED TROPONINS - NSTEMI - ? DEMAND - WILL NEED ISCHEMIC EVAL ONCE ACUTE INFECTION RESOLVES PER CARDIOLOGY, CARDIOLOGY CONSULT IN PROGRESS  - ON ASA, STATIN, BB    # OREN ON CKD - S/T ATN AND URINARY RETENTION - S/P IVF, NOW W/ CASTAÑEDA, NEPHROLOGY CONUSLT IN PROGRESS  - ON FLOMAX, ADDED FINASTERIDE    # MEDICAL CLEARANCE FOR SURGERY - RCRI - 2 - 10.1 % 30 DAY RISK OF DEATH, MI OR CARDIAC ARREST  - CARDIOLOGY CONSULT     # DM -  HBA1C - 11  - LANTUS, SSI + FS    # CAD S/P CABG - PLACED ON ASA, STATIN, BB  - ECHO - SEVERE AORTIC STENOSIS, SEVERE CONCENTRIC LVH, G1DD, MILD KS  - CARDIOLOGY CONSULT - DR. BASSETT   - MAY NEED ISCHEMIC EVAL ONCE ACUTE ILLNESS RESOLVES    # HTN - ON METOPROLOL, NICARDIPINE    # SEVERE, ASYMPTOMATIC, STENOSIS - ONCE ACUTE ILLNESS RESOLVES, WILL LIKELY NEED ISCHEMIC WORKUP, SABIHA TO EVALUATE FURTHER. D/W PATIENT, DAUGHTER AND CARDIOLOGY TEAM [PREVIOUSLY SAW DR. BASSETT]    # VITAMIN D DEFICIENCY - ON CHOLECALCIFEROL    # HLD - STATIN    # CASE DISCUSSED AT LENGTH WITH PATIENT AND DAUGHTER, BIRDIE ROBERT AT BEDSIDE AND VIA PHONE @ [phone] [10/1]  # PATIENT AND DAUGHTER REFUSED [facility] ON PREVIOUS ADMISSION, PREVIOUSLY WISHED FOR PATIENT RETURN HOME W/ HOME PT; HOWEVER NOW, DAUGHTER WISHES FOR PATIENT TO GO TO [facility], AS PATIENT'S WIFE IS CURRENTLY ADMITTED THERE    # GI AND DVT PPX

## 2021-10-01 NOTE — PROGRESS NOTE ADULT - ASSESSMENT
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  As per daughter, patient was taking tylenol which did not help his pain prompting the patient to ask his daughter to take him to the hospital. ON admission, he has no fever or Leukocytosis. The Xray of Right foot shows no Osteomyelitis but MRI of Right foot shows Lateral soft tissue wound with marrow signal abnormality throughout the fifth metatarsal which is consistent of Osteomyelitis. He has seen by Podiatry and wound culture has sent, Zosyn and Vancomycin has started. The ID consult requested to assist with further evaluation and antibiotic management.     # Right Fifth metatarsal DFU with drainage and Osteomyelitis- wound cx grew Enterococcus, Aeromonas and Klebsiella - Zosyn is the ideal to cover All organisms but kidney function is worsening, hence change to Unasyn and Levaquin until kidney function is improved - The pathology shows Bone with acute osteomyelitis and necrotic periosteal tissue.   # OREN- s/p urinary retention -s/p ibrahim catheter - s/p discontinue ACEI  # RLL pneumonia- most likely Aspiration, S/p Vomiting - On Unasyn  # Large bowel distension  # Candiduria- 9/22/21    would recommend:    1. Please Wean off oxygen as tolerated   2. Monitor  kidney function and c/w adjusted doses of Levaquin and  Unasyn as per crcl  3. Wound care as per Podiatry  4. Aspiration precaution  5. May change to Zosyn and oral fluconazole on discharge to continue until 11/3/21 to cover Osteomyelitis    d/w Covering NP     Attending Attestation:    Spent more than 35 minutes on total encounter, more than 50 % of the visit was spent counseling and/or coordinating care by the Attending physician.

## 2021-10-01 NOTE — PROGRESS NOTE ADULT - PROBLEM SELECTOR PLAN 6
AXR with colonic distention, hyperactive bowel sounds  CT A/P shows distended gallbladder  RUQ US with no acute cholecystitis  Surgery recommending no intervention at this time  resolved- BM this am  GI Dr. Vick see US as above  c/w Flomax  c/w Finasteride   f/u TOV today

## 2021-10-01 NOTE — PROGRESS NOTE ADULT - SUBJECTIVE AND OBJECTIVE BOX
C A R D I O L O G Y  **********************************     DATE OF SERVICE: 10-01-21    Patient denies chest pain or shortness of breath.   Review of symptoms otherwise negative.    ampicillin/sulbactam  IVPB 3 Gram(s) IV Intermittent every 6 hours  aspirin  chewable 81 milliGRAM(s) Oral daily  atorvastatin 80 milliGRAM(s) Oral at bedtime  bisacodyl Suppository 10 milliGRAM(s) Rectal once  cyanocobalamin 1000 MICROGram(s) Oral daily  ergocalciferol 42547 Unit(s) Oral <User Schedule>  ferrous    sulfate 325 milliGRAM(s) Oral daily  finasteride 5 milliGRAM(s) Oral daily  fluconAZOLE IVPB      fluconAZOLE IVPB 100 milliGRAM(s) IV Intermittent every 24 hours  heparin   Injectable 5000 Unit(s) SubCutaneous every 8 hours  influenza   Vaccine 0.5 milliLiter(s) IntraMuscular once  insulin lispro (ADMELOG) corrective regimen sliding scale   SubCutaneous Before meals and at bedtime  levoFLOXacin  Tablet 250 milliGRAM(s) Oral every 24 hours  metoprolol succinate  milliGRAM(s) Oral daily  NIFEdipine XL 60 milliGRAM(s) Oral daily  ondansetron Injectable 4 milliGRAM(s) IV Push three times a day PRN  pantoprazole  Injectable 40 milliGRAM(s) IV Push two times a day  polyethylene glycol 3350 17 Gram(s) Oral daily  potassium chloride    Tablet ER 40 milliEquivalent(s) Oral once  senna 2 Tablet(s) Oral at bedtime  tamsulosin 0.4 milliGRAM(s) Oral at bedtime                            9.1    6.17  )-----------( 332      ( 01 Oct 2021 06:35 )             28.3       Hemoglobin: 9.1 g/dL (10-01 @ 06:35)  Hemoglobin: 10.1 g/dL (09-30 @ 06:37)  Hemoglobin: 10.3 g/dL (09-29 @ 08:09)  Hemoglobin: 9.7 g/dL (09-28 @ 07:39)  Hemoglobin: 9.6 g/dL (09-27 @ 06:17)      10-01    145  |  107  |  23<H>  ----------------------------<  109<H>  3.4<L>   |  27  |  1.48<H>    Ca    8.4      01 Oct 2021 06:35  Phos  2.9     10-01  Mg     2.2     10-01      Creatinine Trend: 1.48<--, 1.66<--, 1.73<--, 2.24<--, 3.19<--, 3.83<--    COAGS:     CARDIAC MARKERS ( 29 Sep 2021 08:09 )  1.050 ng/mL / x     / 45 U/L / x     / 1.0 ng/mL        T(C): 37.6 (10-01-21 @ 05:00), Max: 37.6 (10-01-21 @ 05:00)  HR: 79 (10-01-21 @ 05:00) (73 - 79)  BP: 118/55 (10-01-21 @ 05:00) (118/55 - 133/59)  RR: 19 (10-01-21 @ 05:00) (18 - 20)  SpO2: 93% (10-01-21 @ 05:00) (92% - 94%)  Wt(kg): --    I&O's Summary    30 Sep 2021 07:01  -  01 Oct 2021 07:00  --------------------------------------------------------  IN: 0 mL / OUT: 1600 mL / NET: -1600 mL        HEENT:  (-)icterus (-)pallor  CV: N S1 S2 1/6 TRINIDAD (+)2 Pulses B/l  Resp:  Clear to ausculatation B/L, normal effort  GI: (+) BS Soft, NT, ND  Lymph:  (-)Edema, (-)obvious lymphadenopathy  Skin: Warm to touch, Normal turgor  Psych: Appropriate mood and affect      ASSESSMENT/PLAN: 	76y  Male PMH DM on insulin, HTN, HLD, normal lV fx moderate to severe AS 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    - Resolving ATN  - Podiatry f/u noted  - Abx per primary team  - Positive trop noted, likely due to increased demand and renal failure.  - He will need a SABIHA and R+L heart cath once he is medically optimized and infection treated  - Cont medical management of CAD  - oupt cardiac f/u [phone]      Zak Wilkins MD, [facility]  [facility] [phone]

## 2021-10-01 NOTE — PROGRESS NOTE ADULT - SUBJECTIVE AND OBJECTIVE BOX
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 s/p OR debridement today. Nephrology consulted for abrupt rise in SCr.   s/p ibrahim placement for distended bladder on 9/23 with ~400ml of urine o/p  9/27- pt with SOB; CXR with pulmonary edema- pt given Lasix 80mg IV x1. Concern for aspiration PNA    Hospital Medications: Medications reviewed.  REVIEW OF SYSTEMS:  CONSTITUTIONAL: No fevers or chills  RESPIRATORY: no shortness of breath  CARDIOVASCULAR: No chest pain.  GASTROINTESTINAL: No nausea or vomiting, ordiarrhea or abdominal pain    VASCULAR: No bilateral lower extremity edema. Rt foot - denies pain    VITALS:  T(F): 99.6 (10-01-21 @ 05:00), Max: 99.6 (10-01-21 @ 05:00)  HR: 79 (10-01-21 @ 05:00)  BP: 118/55 (10-01-21 @ 05:00)  RR: 19 (10-01-21 @ 05:00)  SpO2: 93% (10-01-21 @ 05:00)  Wt(kg): --    09-30 @ 07:01  -  10-01 @ 07:00  --------------------------------------------------------  IN: 0 mL / OUT: 1600 mL / NET: -1600 mL      PHYSICAL EXAM:  Gen: NAD, calm  HEENT: MMM  Neck: no JVD  Cards: RRR, +S1/S2, +TRINIDAD  Resp: CTA ant  GI: soft, NT  Extremities: no LE edema B/L  : +ibrahim  Derm: Rt foot wrapped/ wound vac    LABS:  10-01    145  |  107  |  23<H>  ----------------------------<  109<H>  3.4<L>   |  27  |  1.48<H>    Ca    8.4      01 Oct 2021 06:35  Phos  2.9     10-01  Mg     2.2     10-01      Creatinine Trend: 1.48 <--, 1.66 <--, 1.73 <--, 2.24 <--, 3.19 <--, 3.83 <--, 4.05 <--                        9.1    6.17  )-----------( 332      ( 01 Oct 2021 06:35 )             28.3     Urine Studies:

## 2021-10-01 NOTE — PROGRESS NOTE ADULT - SUBJECTIVE AND OBJECTIVE BOX
Patient is seen and examined at the bed side, is afebrile.  The kidney function continue to improve.       REVIEW OF SYSTEMS: All other review systems are negative      ALLERGIES: No Known Allergies      Vital Signs Last 24 Hrs  T(C): 37 (01 Oct 2021 12:54), Max: 37.6 (01 Oct 2021 05:00)  T(F): 98.6 (01 Oct 2021 12:54), Max: 99.6 (01 Oct 2021 05:00)  HR: 71 (01 Oct 2021 17:23) (70 - 79)  BP: 125/59 (01 Oct 2021 17:23) (112/53 - 133/59)  BP(mean): --  RR: 17 (01 Oct 2021 17:23) (17 - 20)  SpO2: 95% (01 Oct 2021 17:23) (92% - 95%)      PHYSICAL EXAM:  GENERAL: Not in distress, on oxygen via NC  CHEST/LUNG:  Not using accessory muscles  HEART: s1 and s2 present  ABDOMEN:  Mild distended   EXTREMITIES: Right foot bandage in placed   CNS: Awake, Alert  and oriented        LABS:                        9.1    6.17  )-----------( 332      ( 01 Oct 2021 06:35 )             28.3                           10.1   7.75  )-----------( 376      ( 30 Sep 2021 06:37 )             30.9       10-01    145  |  107  |  23<H>  ----------------------------<  109<H>  3.4<L>   |  27  |  1.48<H>    Ca    8.4      01 Oct 2021 06:35  Phos  2.9     10-01  Mg     2.2     10-01      09-30    145  |  109<H>  |  30<H>  ----------------------------<  117<H>  4.0   |  28  |  1.66<H>    Ca    8.7      30 Sep 2021 06:37  Phos  3.0     09-30  Mg     2.6     09-30      TPro  7.1  /  Alb  2.3<L>  /  TBili  0.5  /  DBili  x   /  AST  20  /  ALT  15  /  AlkPhos  101  09-28    09-28    144  |  109<H>  |  39<H>  ----------------------------<  124<H>  3.7   |  27  |  2.24<H>    Ca    8.7      28 Sep 2021 07:39  Phos  3.0     09-28  Mg     2.3     09-28    TPro  7.1  /  Alb  2.3<L>  /  TBili  0.5  /  DBili  x   /  AST  20  /  ALT  15  /  AlkPhos  101  09-28 09-25    139  |  107  |  41<H>  ----------------------------<  134<H>  4.1   |  21<L>  |  4.05<H>    Ca    8.6      25 Sep 2021 06:40    TPro  6.6  /  Alb  2.3<L>  /  TBili  0.5  /  DBili  x   /  AST  25  /  ALT  15  /  AlkPhos  102  09-25        CAPILLARY BLOOD GLUCOSE  POCT Blood Glucose.: 134 mg/dL (17 Sep 2021 17:11)  POCT Blood Glucose.: 128 mg/dL (17 Sep 2021 11:06)  POCT Blood Glucose.: 157 mg/dL (17 Sep 2021 04:10)      MEDICATIONS  (STANDING):    ampicillin/sulbactam  IVPB 3 Gram(s) IV Intermittent every 6 hours  aspirin  chewable 81 milliGRAM(s) Oral daily  atorvastatin 80 milliGRAM(s) Oral at bedtime  bisacodyl Suppository 10 milliGRAM(s) Rectal once  chlorhexidine 2% Cloths 1 Application(s) Topical <User Schedule>  cyanocobalamin 1000 MICROGram(s) Oral daily  ergocalciferol 76038 Unit(s) Oral <User Schedule>  ferrous    sulfate 325 milliGRAM(s) Oral daily  finasteride 5 milliGRAM(s) Oral daily  fluconAZOLE IVPB      fluconAZOLE IVPB 100 milliGRAM(s) IV Intermittent every 24 hours  heparin   Injectable 5000 Unit(s) SubCutaneous every 8 hours  influenza   Vaccine 0.5 milliLiter(s) IntraMuscular once  insulin lispro (ADMELOG) corrective regimen sliding scale   SubCutaneous Before meals and at bedtime  levoFLOXacin  Tablet 250 milliGRAM(s) Oral every 24 hours  metoprolol succinate  milliGRAM(s) Oral daily  NIFEdipine XL 60 milliGRAM(s) Oral daily  pantoprazole    Tablet 40 milliGRAM(s) Oral before breakfast  polyethylene glycol 3350 17 Gram(s) Oral daily  senna 2 Tablet(s) Oral at bedtime  tamsulosin 0.4 milliGRAM(s) Oral at bedtime      RADIOLOGY & ADDITIONAL TESTS:    Collected Date/Time: 9/22/2021 08:42 EDT   Received Date/Time: 9/23/2021 09:29 EDT   Surgical Pathology Report - Auth (Verified)   Specimen(s) Submitted   1 Right 5th Toe Wound Debridement r/o Osteomyelitis   Final Diagnosis   Right fifth toe; wound debridement:   - Bone with acute osteomyelitis and necrotic periosteal tissue.     9/28/21: US Abdomen Upper Quadrant Right (09.28.21 @ 12:13) Cholelithiasis without evidence of acute cholecystitis. Note is made of right pleural effusion    9/27/21: CT Abdomen and Pelvis w/ Oral Cont (09.27.21 @ 15:53) >No acute intra-abdominal pathology.    Small bilateral pleural effusions with compressive atelectasis of both lower lobes.    9/26/21: Xray Chest 1 View-PORTABLE IMMEDIATE (Xray Chest 1 View-PORTABLE IMMEDIATE .) (09.26.21 @ 15:28) : Small bilateral pleural effusions and mild pulmonary edema. A right lower lobe pneumonia is not excluded.      9/26/21: Xray Abdomen 1 View Portable, IMMEDIATE (Xray Abdomen 1 View Portable, IMMEDIATE .) (09.26.21 @ 15:28) : There is a nasogastric tube with its tip in the stomach. There is gaseous distention of the colon. No pathologic calcifications are seen. The osseous structures are intact with degenerative change is present in the spine.      9/17/21 : MR Foot No Cont, Right (09.17.21 @ 13:04) : Resection at the mid aspect of the fifth metatarsal. Lateral soft tissue wound with marrow signal abnormality throughout the fifth metatarsal and within the adjacent fourth metatarsal head which is nonspecific in the setting of recent surgery although osteomyelitis is suspected.    9/16/21 : Xray Foot AP + Lateral + Oblique, Right (09.16.21 @ 15:03) : No acute finding. No plain film evidence of osteomyelitis.        MICROBIOLOGY DATA:    Urine Microscopic-Add On (NC) (09.22.21 @ 16:14)   Red Blood Cell - Urine: 0-2 /HPF   White Blood Cell - Urine: 3-5 /HPF   Bacteria: Moderate /HPF   Comment - Urine: yeast cells present   Epithelial Cells: Moderate /HPF     Culture - Tissue with Gram Stain (09.22.21 @ 13:54)   Gram Stain: No polymorphonuclear cells seen per low power field   No organisms seen per oil power field   Specimen Source: .Tissue Right 5th toe r/o osteomyeltis   Culture Results: No growth     COVID-19 David Domain Antibody (09.18.21 @ 12:55)   COVID-19 David Domain Antibody Result: >250.00:    Culture - Blood (09.17.21 @ 10:28)   Specimen Source: .Blood Blood-Peripheral   Culture Results: No growth to date.     Culture - Blood (09.17.21 @ 10:28)   Specimen Source: .Blood Blood-Venous   Culture Results: No growth to date.     Culture - Surgical Swab (09.16.21 @ 21:42)   - Amikacin: S <=16   - Amikacin: S <=16   - Amoxicillin/Clavulanic Acid: S <=8/4   - Ampicillin: R >16 These ampicillin results predict results for amoxicillin   - Ampicillin: S <=2 Predicts results to ampicillin/sulbactam, amoxacillin-clavulanate and piperacillin-tazobactam.   - Ampicillin/Sulbactam: S 8/4 Enterobacter, Citrobacter, and Serratia may develop resistance during prolonged therapy (3-4 days)   - Ampicillin/Sulbactam: R >16/8   - Aztreonam: S <=4   - Aztreonam: S <=4   - Cefazolin: R 16 Enterobacter, Citrobacter, and Serratia may develop resistance during prolonged therapy (3-4 days)   - Cefazolin: R >16   - Cefepime: S <=2   - Cefepime: S <=2   - Cefoxitin: S <=8   - Cefoxitin: S <=8   - Ceftazidime: S <=1   - Ceftriaxone: S <=1   - Ceftriaxone: S <=1 Enterobacter, Citrobacter, and Serratia may develop resistance during prolonged therapy   - Ciprofloxacin: S <=0.25   - Ciprofloxacin: S <=0.25   - Ertapenem: S <=0.5   - Gentamicin: S <=2   - Gentamicin: S <=2   - Imipenem: S <=1   - Levofloxacin: S <=0.5   - Levofloxacin: S <=0.5   - Meropenem: S <=1   - Meropenem: S <=1   - Piperacillin/Tazobactam: S <=8   - Piperacillin/Tazobactam: S <=8   - Tetra/Doxy: S 4   - Tobramycin: S <=2   - Trimethoprim/Sulfamethoxazole: S <=0.5/9.5   - Trimethoprim/Sulfamethoxazole: S <=0.5/9.5   - Vancomycin: S 2   Specimen Source: .Surgical Swab right foot wound   Culture Results:   Culture yields >4 types of aerobic and/or anaerobic bacteria   Call client services within 7 days if further workup is clinically   indicated. Culture includes   Rare Aeromonas hydrophila/caviae   Few Klebsiella oxytoca/Raoutella ornithinolytica   Few Enterococcus faecalis   Few Streptococcus agalactiae (Group B) isolated   Group B streptococci are susceptible to ampicillin,   penicillin and cefazolin, but may be resistant to   erythromycin and clindamycin.   Recommendations for intrapartum prophylaxis for Group B   streptococci are penicillin or ampicillin.   Organism Identification: Aeromonas hydrophila/caviae   Klebsiella oxytoca /Raoutella ornithinolytica   Enterococcus faecalis   Organism: Aeromonas hydrophila/caviae   Organism: Klebsiella oxytoca /Raoutella ornithinolytica   Organism: Enterococcus faecalis   COVID-19 PCR . (09.16.21 @ 22:24)   COVID-19 PCR: NotDetec:

## 2021-10-01 NOTE — PROGRESS NOTE ADULT - ASSESSMENT
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 Of note, the patient never followed up with podiatry after his procedure in August. MRI confirms suspected osteomyelitis - patient followed by podiatry and ID, patient undergo debridement and wound VAC placement. Patient currently on course of Unasyn and Levofloxacin, ID. Surgical pathology resulted OM, wound culture resulting Enterococcus Aeromonal and Klebsiella- treated initially with Zosyn however hospital course complicated with OREN likely mixed etiology with prerenal from active infection and pulmonary edema, intrarenal due to toxicity from IV antibiotics and post renal from urinary retention, Nephrology consulted for optimization of kidney function. IV antibiotic regimen switched to Unasyn and Levaquin, sensitive for organism, will need 6 weeks of IV antibiotics. Patient subsequently treated for Pulmonary Edema with IV lasix, Cardiology recommending SABIHA with L/R heart cath once infection clears and medically stable. Hospital course further complicated by abdominal distention and constipation for which he received lactulose and vomited. The patient had subsequent respiratory distress and it is suspected he aspirated as CXR showed possible RLL PNA. AXR shows distended loops of bowel for which NG tube was placed which the patient removed overnight 9/26 - re-attempts to replace NG tube were unsuccessful as patient was non-cooperative. Surgery consulted. CT abdomen deferred for now as patient is unstable and will not tolerate lying flat. ICU consulted given patient's deterioration in clinical status.

## 2021-10-01 NOTE — PROGRESS NOTE ADULT - PROBLEM SELECTOR PLAN 1
afebrile, aleukocytosis  wound culture growing Enterococcus Aeromonal and Klebsiella  c/w Unasyn for 6 wks  c/w Levaquin   PICC placement for today  c/w wound vac as podiatry recommending  see MRI and pathology as above afebrile, aleukocytosis  wound culture growing Enterococcus Aeromonal and Klebsiella  c/w Unasyn IV for 6 wks until 10/3  c/w Levaquin   PICC placement for today  c/w wound vac as podiatry recommending  see MRI and pathology as above  Podiatry consult  ID Dr. Barrett

## 2021-10-01 NOTE — PROGRESS NOTE ADULT - SUBJECTIVE AND OBJECTIVE BOX
NP Note discussed with  Primary Attending    Patient is a 76y old  Male who presents with a chief complaint of R Foot Pain (01 Oct 2021 10:17)      INTERVAL HPI/OVERNIGHT EVENTS: Patient seen and examined at bedside.     MEDICATIONS  (STANDING):  ampicillin/sulbactam  IVPB 3 Gram(s) IV Intermittent every 6 hours  aspirin  chewable 81 milliGRAM(s) Oral daily  atorvastatin 80 milliGRAM(s) Oral at bedtime  bisacodyl Suppository 10 milliGRAM(s) Rectal once  cyanocobalamin 1000 MICROGram(s) Oral daily  ergocalciferol 04932 Unit(s) Oral <User Schedule>  ferrous    sulfate 325 milliGRAM(s) Oral daily  finasteride 5 milliGRAM(s) Oral daily  fluconAZOLE IVPB      fluconAZOLE IVPB 100 milliGRAM(s) IV Intermittent every 24 hours  heparin   Injectable 5000 Unit(s) SubCutaneous every 8 hours  influenza   Vaccine 0.5 milliLiter(s) IntraMuscular once  insulin lispro (ADMELOG) corrective regimen sliding scale   SubCutaneous Before meals and at bedtime  levoFLOXacin  Tablet 250 milliGRAM(s) Oral every 24 hours  metoprolol succinate  milliGRAM(s) Oral daily  NIFEdipine XL 60 milliGRAM(s) Oral daily  pantoprazole  Injectable 40 milliGRAM(s) IV Push two times a day  polyethylene glycol 3350 17 Gram(s) Oral daily  potassium chloride    Tablet ER 40 milliEquivalent(s) Oral once  senna 2 Tablet(s) Oral at bedtime  tamsulosin 0.4 milliGRAM(s) Oral at bedtime    MEDICATIONS  (PRN):  ondansetron Injectable 4 milliGRAM(s) IV Push three times a day PRN Nausea and/or Vomiting      __________________________________________________  REVIEW OF SYSTEMS:    CONSTITUTIONAL: No fever,   EYES: no acute visual disturbances  NECK: No pain or stiffness  RESPIRATORY: No cough; No shortness of breath  CARDIOVASCULAR: No chest pain, no palpitations  GASTROINTESTINAL: No pain. No nausea or vomiting; No diarrhea   NEUROLOGICAL: No headache or numbness, no tremors  MUSCULOSKELETAL: No joint pain, no muscle pain  GENITOURINARY: no dysuria, no frequency, no hesitancy  PSYCHIATRY: no depression , no anxiety  ALL OTHER  ROS negative        Vital Signs Last 24 Hrs  T(C): 37.6 (01 Oct 2021 05:00), Max: 37.6 (01 Oct 2021 05:00)  T(F): 99.6 (01 Oct 2021 05:00), Max: 99.6 (01 Oct 2021 05:00)  HR: 79 (01 Oct 2021 05:00) (73 - 79)  BP: 118/55 (01 Oct 2021 05:00) (118/55 - 133/59)  BP(mean): --  RR: 19 (01 Oct 2021 05:00) (18 - 20)  SpO2: 93% (01 Oct 2021 05:00) (92% - 93%)    ________________________________________________  PHYSICAL EXAM:  GENERAL: NAD  HEENT: Normocephalic;  conjunctivae and sclerae clear; moist mucous membranes;   NECK : supple  CHEST/LUNG: Clear to auscultation bilaterally with good air entry   HEART: S1 S2  regular; no murmurs, gallops or rubs  ABDOMEN: Soft, Nontender, Nondistended; Bowel sounds present  EXTREMITIES: right foot wound vac dressing, no cyanosis; no edema; no calf tenderness  SKIN: dry dusky, Pressure ulcer stage 2 scrotal, sacrum, warm and dry; no rash  NERVOUS SYSTEM:  confused, A+Ox2    _________________________________________________  LABS:                        9.1    6.17  )-----------( 332      ( 01 Oct 2021 06:35 )             28.3     10-01    145  |  107  |  23<H>  ----------------------------<  109<H>  3.4<L>   |  27  |  1.48<H>    Ca    8.4      01 Oct 2021 06:35  Phos  2.9     10-01  Mg     2.2     10-01          CAPILLARY BLOOD GLUCOSE      POCT Blood Glucose.: 117 mg/dL (01 Oct 2021 08:05)  POCT Blood Glucose.: 138 mg/dL (30 Sep 2021 21:01)  POCT Blood Glucose.: 193 mg/dL (30 Sep 2021 16:57)        RADIOLOGY & ADDITIONAL TESTS:  < from: Xray Chest 1 View- PORTABLE-Urgent (Xray Chest 1 View- PORTABLE-Urgent .) (09.28.21 @ 13:06) >    EXAM:  XR CHEST PORTABLE URGENT 1V                            PROCEDURE DATE:  09/28/2021          INTERPRETATION:  Portable chest radiograph    CLINICAL INFORMATION: Chest pain    TECHNIQUE:  Portable  AP view of the chest.    COMPARISON: 9/27/2021 chest available for review.    FINDINGS:    The lungs show persistent bilateral lung perihilar/peripheral diffuse airspace disease No pneumothorax.    The  heart is enlarged in transverse diameter. No hilar mass.  Status post median sternotomy.     Visualized osseous structures are intact.    IMPRESSION:   No significant change..    < end of copied text >  < from: Xray Abdomen 1 View Portable, IMMEDIATE (Xray Abdomen 1 View Portable, IMMEDIATE .) (09.26.21 @ 15:28) >    EXAM:  XR ABDOMEN PORTABLE IMMED 1V                            PROCEDURE DATE:  09/26/2021          INTERPRETATION:  Indication: Vomiting.    TECHNIQUE: 2 portable views of the abdomen.    COMPARISON: 9/25/2021    FINDINGS: There is a nasogastric tube with its tip in the stomach. There is gaseous distention of the colon. No pathologic calcifications are seen. The osseous structures are intact with degenerative change is present in the spine.    IMPRESSION: Gaseous distention of the colon. If clinically warranted, further evaluation may be obtained with CT scan.    < end of copied text >  < from: US Kidney and Bladder (09.23.21 @ 10:08) >  EXAM:  US KIDNEYS AND BLADDER                            PROCEDURE DATE:  09/23/2021          INTERPRETATION:  CLINICAL INFORMATION: Acute kidney injury. Urinary retention.    COMPARISON: 8/16/2021    TECHNIQUE: Sonography of the kidneys and bladder.    FINDINGS:    Right kidney: 11.7 cm. No renal mass, hydronephrosis or calculi. The right kidney is hyperechoic.    Left kidney: 10.9 cm. No renal mass, hydronephrosis or calculi. The left kidney is hyperechoic.    Urinary bladder: The bladder hasa volume of 520 cc which is largely distended. The bladder is otherwise grossly unremarkable. The patient has no urge to void.    IMPRESSION:    No hydronephrosis.    Both kidneys are hyperechoic for which clinical correlation with intrinsic medical renal disease is recommended.    Largely distended urinary bladder.    < end of copied text >  < from: MR Foot No Cont, Right (09.17.21 @ 13:04) >    EXAM:  MR FOOT RT                            PROCEDURE DATE:  09/17/2021          INTERPRETATION:  Clinical Information: Recent fifth metatarsal head resection now with fifth digit pain, swelling and foul discharge.    Comparison: Radiographs of the right foot from 9/16/2021 and MRI the right foot from 8/16/2021.    Technique:  MRI of the right midfoot and forefoot.  Intravenous Contrast: None.    Findings:    There is a resection at the mid aspect of the fifth metatarsal shaft. There is a lateral soft tissue wound beginning at the level of the amputation which extends distally. There is susceptibility artifact in the region of the amputation consistent with postoperative change. There is hyperintense T2 marrow signal throughout the remaining fifth metatarsal and within the adjacent fourth metatarsal head which is nonspecific and could be related to recent postoperative changes although osteomyelitis is suspected.    There is edema and mild atrophy within the plantar muscles of the foot. There is minimal spurring at the first metatarsophalangeal and hallux sesamoid articulations.    Impression:  Resection at the mid aspect of the fifth metatarsal. Lateral soft tissue wound with marrow signal abnormality throughout the fifth metatarsal and within the adjacent fourth metatarsal head which is nonspecific in the setting of recent surgery although osteomyelitis is suspected.      < end of copied text >  < from: Xray Foot AP + Lateral + Oblique, Right (09.16.21 @ 15:03) >    EXAM:  XR FOOT COMP MIN 3 VIEWS RT                            PROCEDURE DATE:  09/16/2021          INTERPRETATION:  Postop, rule out infection.    3 views right foot. Prior 8/20/2021.     Status post resection of the fifth toe and distal fifth metatarsal unchanged in appearance. No focal bone lysis or unusual periosteal reaction to suggest osteomyelitis. No acute fracture or dislocation. The remainder study unchanged. No soft tissue gas.    IMPRESSION: No acute finding. No plain film evidence of osteomyelitis.    < end of copied text >  Historical Values  Surgical Pathology Report (09.22.21 @ 08:42)   Surgical Pathology Report:   ACCESSION No: [id number]   Patient: SHER ROBERT   Accession: [id number]   Collected Date/Time: 9/22/2021 08:42 EDT   Received Date/Time: 9/23/2021 09:29 EDT   Surgical Pathology Report - Auth (Verified)   Specimen(s) Submitted   1 Right 5th Toe Wound Debridement r/o Osteomyelitis   Final Diagnosis   Right fifth toe; wound debridement:   - Bone with acute osteomyelitis and necrotic periosteal tissue.   Verified by: Billie Paris MD   (Electronic Signature)   Reported on: 09/27/21 14:40 EDT, [facility], [address] [place] [address], [place] [address]   Phone: [phone] Fax: [phone]     Imaging  Reviewed:  YES    Consultant(s) Notes Reviewed:   YES      Plan of care was discussed with patient and /or primary care giver; all questions and concerns were addressed

## 2021-10-01 NOTE — PROGRESS NOTE ADULT - PROBLEM SELECTOR PLAN 7
A1C 11.4  ISS   Accuchecks ACHS  controlled AXR with colonic distention, hyperactive bowel sounds  CT A/P shows distended gallbladder  RUQ US with no acute cholecystitis  Surgery recommending no intervention at this time  resolved- BM this am  GI Dr. Vick

## 2021-10-01 NOTE — PROGRESS NOTE ADULT - ASSESSMENT
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 s/p OR debridement today. Nephrology consulted for abrupt rise in SCr.     1. OREN- likely ATN in the setting of infection and ACEi use. Lisinopril discontinued 9/22. ATN resolving.  s/p Lasix 80mg IV on 9/27 for pulmonary edema/ SOB. Renal function improving with good urine o/p s/p Lasix 40mg IV on 9/30.   Kidney/ Bladder US with large distended bladder s/p ibrahim placement on 9/23; renal function did not improve post ibrahim; less likely due to obstructive uropathy. UA with 30 protein, trace blood with small LE, Check UCX to r/o UTI.   FeNa 0.72% and FeUrea 19.39%; consistent with pre-renal OREN. However renal function worsening on IVF. No peripheral eosinophilia- no signs of AIN. C3 & C4 wnl with neg ASO- no signs of PIGN. Strict I/Os. Avoid nephrotoxins/ NSAIDs/ RCA. Monitor BMP.  2. 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 Will defer secondary w/u as an outpt. Avoid nephrotoxins  3. HTN 2/2 CKD- BP acceptable. Lisinopril d/c due to OREN. c/w Nifedipine ER 60mg PO qd. Monitor BP  4. Acute osteomyelitis- s/p debridement 9/22. Pt on Unasyn & Levaquin. Plan as per ID      [facility] NEPHROLOGY  Bryn Johnson M.D.  Domingo Booth D.O.  Zakia Rodriguez M.D.  Zonia Adams, MSN, ANP-C  [phone]    [address] [place] [address]   Patient is a 77yo Male with DM on insulin, HTN, HLD, CAD, s/p R partial 5th ray amputation 8/19/2021 p/w R foot pain and foul drainage a/w diabetic foot ulcer suspicious for osteomyelitis. s/p OR debridement today. Nephrology consulted for abrupt rise in SCr.     1. OREN- likely ATN in the setting of infection and ACEi use. Lisinopril discontinued 9/22. ATN resolving.  s/p Lasix 80mg IV on 9/27 for pulmonary edema/ SOB. Renal function improving with good urine o/p s/p Lasix 40mg IV on 9/30. Mild hypokalemia- will give KCl 40meq PO x1. Monitor lytes.   Kidney/ Bladder US with large distended bladder s/p ibrahim placement on 9/23; renal function did not improve post ibrahim; less likely due to obstructive uropathy. UA with 30 protein, trace blood with small LE, Check UCX to r/o UTI.   FeNa 0.72% and FeUrea 19.39%; consistent with pre-renal OREN. However renal function worsening on IVF. No peripheral eosinophilia- no signs of AIN. C3 & C4 wnl with neg ASO- no signs of PIGN. Strict I/Os. Avoid nephrotoxins/ NSAIDs/ RCA. Monitor BMP.  2. 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 Will defer secondary w/u as an outpt. Avoid nephrotoxins  3. HTN 2/2 CKD- BP acceptable. Lisinopril d/c due to OREN. c/w Nifedipine ER 60mg PO qd. Monitor BP  4. Acute osteomyelitis- s/p debridement 9/22. Pt on Unasyn & Levaquin. Plan as per ID      [facility] NEPHROLOGY  Bryn Johnson M.D.  Domingo Booth D.O.  Zakia Rodriguez M.D.  Zonia Adams, MSN, ANP-C  [phone]    [address] [place] [address]

## 2021-10-01 NOTE — PROGRESS NOTE ADULT - PROBLEM SELECTOR PLAN 5
see US as above  c/w Flomax  c/w Finasteride   f/u TOV today OREN superimposed on Stage III CKD, improving  mixed etiology prerenal, intrarenal and postrenal  Creatinine down-trending  avoid nephrotoxic agents, dose as per GFR  monitor creatinine, electrolytes  holding Lisinopril, Nifedipine incr. 90 mg daily   C3/ C4; ASO negative  Urine eosinophils negative  renal ultrasound negative for hydronephrosis, hyperechoic kidneys significant for medical renal disease  Nephrology Dr. Rodriguez

## 2021-10-01 NOTE — PROGRESS NOTE ADULT - PROBLEM SELECTOR PLAN 3
see plan as above  c/w ASA, lipitor, BB,   ACEI held due to OREN  EF 55-60%, GIDD   Cardiology recommends SABIHA and L/R cardiac cath TAVR, after treatment of infection and medical optimization

## 2021-10-01 NOTE — PROGRESS NOTE ADULT - PROBLEM SELECTOR PLAN 4
OREN superimposed on Stage III CKD, improving  mixed etiology prerenal, intrarenal and postrenal  Creatinine down-trending  avoid nephrotoxic agents, dose as per GFR  monitor creatinine, electrolytes  holding Lisinopril, Nifedipine incr. 90 mg daily   C3/ C4; ASO negative  Urine eosinophils negative  renal ultrasound negative for hydronephrosis, hyperechoic kidneys significant for medical renal disease  Nephrology Dr. Rodriguez Yeast cell present on 9/22  c/w fluconazole IV for total of 6 weeks until 10/3  ID Dr. Barrett

## 2021-10-01 NOTE — PROGRESS NOTE ADULT - SUBJECTIVE AND OBJECTIVE BOX
Podiatry Interval HPI: Pt seen bedside resting comfortably. Pt denies any overnight acute events. Denies pain to R foot. Denies constitutional symptoms No other pedal complaints.    Podiatry HPI: 76 year old male with a PMHx of DM, HTN, HLD, CAD to ED presents to the ED for a Right Foot s/p 5th foot partial ray resection and fillet toe flap. Patient states that he has sharp localized non-radiating pain to the Right foot 5th metatarsal. States that after his surgery, he did not see a podiatrist and he came into the ED because he saw drainage and was having pain. Denies any constitutional symptoms of N/V/C/F/SOB. Denies any other pedal complaints at this time. Denies calf pain today, bilaterally.    Medications ampicillin/sulbactam  IVPB 3 Gram(s) IV Intermittent every 6 hours  aspirin  chewable 81 milliGRAM(s) Oral daily  atorvastatin 80 milliGRAM(s) Oral at bedtime  bisacodyl Suppository 10 milliGRAM(s) Rectal once  cyanocobalamin 1000 MICROGram(s) Oral daily  ergocalciferol 98805 Unit(s) Oral <User Schedule>  ferrous    sulfate 325 milliGRAM(s) Oral daily  finasteride 5 milliGRAM(s) Oral daily  fluconAZOLE IVPB      fluconAZOLE IVPB 100 milliGRAM(s) IV Intermittent every 24 hours  furosemide   Injectable 60 milliGRAM(s) IV Push once  heparin   Injectable 5000 Unit(s) SubCutaneous every 8 hours  influenza   Vaccine 0.5 milliLiter(s) IntraMuscular once  insulin lispro (ADMELOG) corrective regimen sliding scale   SubCutaneous Before meals and at bedtime  levoFLOXacin  Tablet 250 milliGRAM(s) Oral every 24 hours  metoprolol succinate  milliGRAM(s) Oral daily  NIFEdipine XL 60 milliGRAM(s) Oral daily  ondansetron Injectable 4 milliGRAM(s) IV Push three times a day PRN  pantoprazole    Tablet 40 milliGRAM(s) Oral before breakfast  polyethylene glycol 3350 17 Gram(s) Oral daily  senna 2 Tablet(s) Oral at bedtime  tamsulosin 0.4 milliGRAM(s) Oral at bedtime    FHFamily history of acute myocardial infarction (Father)    Family history of diabetes mellitus (Mother, Sibling)    Family history of hypertension (Mother, Sibling)    Family history of hyperlipidemia (Mother, Sibling)    ,   PMHHTN (hypertension)    HLD (hyperlipidemia)    DM (diabetes mellitus)    CAD (coronary artery disease)    Glaucoma, angle-closure    [place] (hard of hearing)       PSHNo significant past surgical history    S/P CABG (coronary artery bypass graft)    History of thyroid surgery        Labs                          9.1    6.17  )-----------( 332      ( 01 Oct 2021 06:35 )             28.3      10-01    145  |  107  |  23<H>  ----------------------------<  109<H>  3.4<L>   |  27  |  1.48<H>    Ca    8.4      01 Oct 2021 06:35  Phos  2.9     10-01  Mg     2.2     10-01       Vital Signs Last 24 Hrs  T(C): 37 (01 Oct 2021 12:54), Max: 37.6 (01 Oct 2021 05:00)  T(F): 98.6 (01 Oct 2021 12:54), Max: 99.6 (01 Oct 2021 05:00)  HR: 70 (01 Oct 2021 12:54) (70 - 79)  BP: 112/53 (01 Oct 2021 12:54) (112/53 - 133/59)  BP(mean): --  RR: 18 (01 Oct 2021 12:54) (18 - 20)  SpO2: 94% (01 Oct 2021 12:54) (92% - 94%)  Sedimentation Rate, Erythrocyte: 77 mm/Hr (09-16-21 @ 16:03)  Sedimentation Rate, Erythrocyte: 91 mm/Hr (08-22-21 @ 15:41)         C-Reactive Protein, Serum: 45 mg/L (09-16-21 @ 23:33)  C-Reactive Protein, Serum: 85 mg/L (08-22-21 @ 21:30)  C-Reactive Protein, Serum: 67 mg/L (08-15-21 @ 11:55)  WBC Count: 6.17 K/uL (10-01-21 @ 06:35)    PHYSICAL EXAM (Unable to conduct a thorough physical examination today, physical examination per last time)  LE Focused:    Vasc:  DP/PT pulses were faintly palpable, bilateral. DP/PT pulses were monophasic on doppler, bilaterally. CFT<3 seconds to all digits.   Derm: Surgical site with graft in place to R 5th ray, granular wound bed through the graft, minimal drainage or discharge. minimal erythema and edema noted, eschar forming over the wound. Dorsal foot wound noted- mild erythema and edema noted. DTI noted to b/l heel  Neuro: Protective sensation grossly diminished, b/l  MSK: 5/5 muscle strength noted to the pedal compartments. 5th digit amputation R foot    Imaging:  INTERPRETATION:  Postop, rule out infection.    3 views right foot. Prior 8/20/2021.    Status post resection of the fifth toe and distal fifth metatarsal unchanged in appearance. No focal bone lysis or unusual periosteal reaction to suggest osteomyelitis. No acute fracture or dislocation. The remainder study unchanged. No soft tissue gas.    IMPRESSION: No acute finding. No plain film evidence of osteomyelitis.        MRI R foot:     INTERPRETATION:  Clinical Information: Recent fifth metatarsal head resection now with fifth digit pain, swelling and foul discharge.    Comparison: Radiographs of the right foot from 9/16/2021 and MRI the right foot from 8/16/2021.    Technique:  MRI of the right midfoot and forefoot.  Intravenous Contrast: None.    Findings:    There is a resection at the mid aspect of the fifth metatarsal shaft. There is a lateral soft tissue wound beginning at the level of the amputation which extends distally. There is susceptibility artifact in the region of the amputation consistent with postoperative change. There is hyperintense T2 marrow signal throughout the remaining fifth metatarsal and within the adjacent fourth metatarsal head which is nonspecific and could be related to recent postoperative changes although osteomyelitis is suspected.    There is edema and mild atrophy within the plantar muscles of the foot. There is minimal spurring at the first metatarsophalangeal and hallux sesamoid articulations.    Impression:  Resection at the mid aspect of the fifth metatarsal. Lateral soft tissue wound with marrow signal abnormality throughout the fifth metatarsal and within the adjacent fourth metatarsal head which is nonspecific in the setting of recent surgery although osteomyelitis is suspected.      Culture Results:   Rare Aeromonas hydrophila/caviae   Few Klebsiella oxytoca/Raoutella ornithinolytica   Few Enterococcus faecalis   Few Streptococcus agalactiae (Group B) isolated   Group B streptococci are susceptible to ampicillin,   penicillin and cefazolin, but may be resistant to   erythromycin and clindamycin.   Recommendations for intrapartum prophylaxis for Group B   streptococci are penicillin or ampicillin. (09.16.21 @ 21:42)     Gram Stain:   No polymorphonuclear cells seen per low power field   No organisms seen per oil power field (09.22.21 @ 13:54)       Historical Values  Gram Stain:   No polymorphonuclear cells seen per low power field   No organisms seen per oil power field (09.22.21 @ 13:54)   Gram Stain:   No polymorphonuclear leukocytes seen per low power field   No organisms seen per oil power field (08.20.21 @ 03:59)

## 2021-10-01 NOTE — PROGRESS NOTE ADULT - ASSESSMENT
A:   s/p R 5th ray resection - 8/19  s/p R 5th wound debridement, graft application, bone biopsy and wound vac application 9/22     P:  Patient evaluated, chart reviewed  Xrays reviewed  ESR/CRP reviewed   MRI reviewed   Intra-op culture - NGTD  intra-op bone biopsy - NGTD  Wound Vac was changed today (10/1) and to be left intact till Saturday- adaptic/ wound vac. Adaptic applied to dorsal foot wound. Dressed with webril/ACE   Recommend elevation of b/l legs   Applied CAIR boots, recommend cair boots at all times to prevent breakdown   Continue local wound care to allow graft incorporation  Appreciate ID consult- continue abx per ID   Podiatry will follow while in house  Discussed with Dr. Vazquez and seen with Dr. Lara

## 2021-10-02 LAB
ANION GAP SERPL CALC-SCNC: 11 MMOL/L — SIGNIFICANT CHANGE UP (ref 5–17)
BUN SERPL-MCNC: 20 MG/DL — HIGH (ref 7–18)
CALCIUM SERPL-MCNC: 8.5 MG/DL — SIGNIFICANT CHANGE UP (ref 8.4–10.5)
CHLORIDE SERPL-SCNC: 106 MMOL/L — SIGNIFICANT CHANGE UP (ref 96–108)
CO2 SERPL-SCNC: 26 MMOL/L — SIGNIFICANT CHANGE UP (ref 22–31)
CREAT SERPL-MCNC: 1.49 MG/DL — HIGH (ref 0.5–1.3)
CRP SERPL-MCNC: 43 MG/L — HIGH
ERYTHROCYTE [SEDIMENTATION RATE] IN BLOOD: 108 MM/HR — HIGH (ref 0–20)
GLUCOSE BLDC GLUCOMTR-MCNC: 110 MG/DL — HIGH (ref 70–99)
GLUCOSE BLDC GLUCOMTR-MCNC: 112 MG/DL — HIGH (ref 70–99)
GLUCOSE BLDC GLUCOMTR-MCNC: 113 MG/DL — HIGH (ref 70–99)
GLUCOSE BLDC GLUCOMTR-MCNC: 171 MG/DL — HIGH (ref 70–99)
GLUCOSE SERPL-MCNC: 103 MG/DL — HIGH (ref 70–99)
HCT VFR BLD CALC: 27.2 % — LOW (ref 39–50)
HGB BLD-MCNC: 8.7 G/DL — LOW (ref 13–17)
MCHC RBC-ENTMCNC: 29.1 PG — SIGNIFICANT CHANGE UP (ref 27–34)
MCHC RBC-ENTMCNC: 32 GM/DL — SIGNIFICANT CHANGE UP (ref 32–36)
MCV RBC AUTO: 91 FL — SIGNIFICANT CHANGE UP (ref 80–100)
NRBC # BLD: 0 /100 WBCS — SIGNIFICANT CHANGE UP (ref 0–0)
PLATELET # BLD AUTO: 315 K/UL — SIGNIFICANT CHANGE UP (ref 150–400)
POTASSIUM SERPL-MCNC: 3.7 MMOL/L — SIGNIFICANT CHANGE UP (ref 3.5–5.3)
POTASSIUM SERPL-SCNC: 3.7 MMOL/L — SIGNIFICANT CHANGE UP (ref 3.5–5.3)
RBC # BLD: 2.99 M/UL — LOW (ref 4.2–5.8)
RBC # FLD: 13.7 % — SIGNIFICANT CHANGE UP (ref 10.3–14.5)
SARS-COV-2 RNA SPEC QL NAA+PROBE: SIGNIFICANT CHANGE UP
SODIUM SERPL-SCNC: 143 MMOL/L — SIGNIFICANT CHANGE UP (ref 135–145)
WBC # BLD: 5.92 K/UL — SIGNIFICANT CHANGE UP (ref 3.8–10.5)
WBC # FLD AUTO: 5.92 K/UL — SIGNIFICANT CHANGE UP (ref 3.8–10.5)

## 2021-10-02 PROCEDURE — 71045 X-RAY EXAM CHEST 1 VIEW: CPT | Mod: 26

## 2021-10-02 RX ORDER — FUROSEMIDE 40 MG
60 TABLET ORAL ONCE
Refills: 0 | Status: COMPLETED | OUTPATIENT
Start: 2021-10-02 | End: 2021-10-02

## 2021-10-02 RX ADMIN — SENNA PLUS 2 TABLET(S): 8.6 TABLET ORAL at 21:09

## 2021-10-02 RX ADMIN — HEPARIN SODIUM 5000 UNIT(S): 5000 INJECTION INTRAVENOUS; SUBCUTANEOUS at 13:40

## 2021-10-02 RX ADMIN — PREGABALIN 1000 MICROGRAM(S): 225 CAPSULE ORAL at 11:36

## 2021-10-02 RX ADMIN — POLYETHYLENE GLYCOL 3350 17 GRAM(S): 17 POWDER, FOR SOLUTION ORAL at 11:35

## 2021-10-02 RX ADMIN — TAMSULOSIN HYDROCHLORIDE 0.4 MILLIGRAM(S): 0.4 CAPSULE ORAL at 21:10

## 2021-10-02 RX ADMIN — HEPARIN SODIUM 5000 UNIT(S): 5000 INJECTION INTRAVENOUS; SUBCUTANEOUS at 21:10

## 2021-10-02 RX ADMIN — PANTOPRAZOLE SODIUM 40 MILLIGRAM(S): 20 TABLET, DELAYED RELEASE ORAL at 06:24

## 2021-10-02 RX ADMIN — Medication 1: at 12:16

## 2021-10-02 RX ADMIN — AMPICILLIN SODIUM AND SULBACTAM SODIUM 200 GRAM(S): 250; 125 INJECTION, POWDER, FOR SUSPENSION INTRAMUSCULAR; INTRAVENOUS at 06:24

## 2021-10-02 RX ADMIN — CHLORHEXIDINE GLUCONATE 1 APPLICATION(S): 213 SOLUTION TOPICAL at 06:42

## 2021-10-02 RX ADMIN — AMPICILLIN SODIUM AND SULBACTAM SODIUM 200 GRAM(S): 250; 125 INJECTION, POWDER, FOR SUSPENSION INTRAMUSCULAR; INTRAVENOUS at 17:32

## 2021-10-02 RX ADMIN — ATORVASTATIN CALCIUM 80 MILLIGRAM(S): 80 TABLET, FILM COATED ORAL at 21:09

## 2021-10-02 RX ADMIN — FLUCONAZOLE 100 MILLIGRAM(S): 150 TABLET ORAL at 20:29

## 2021-10-02 RX ADMIN — AMPICILLIN SODIUM AND SULBACTAM SODIUM 200 GRAM(S): 250; 125 INJECTION, POWDER, FOR SUSPENSION INTRAMUSCULAR; INTRAVENOUS at 23:52

## 2021-10-02 RX ADMIN — Medication 60 MILLIGRAM(S): at 05:46

## 2021-10-02 RX ADMIN — HEPARIN SODIUM 5000 UNIT(S): 5000 INJECTION INTRAVENOUS; SUBCUTANEOUS at 05:46

## 2021-10-02 RX ADMIN — Medication 325 MILLIGRAM(S): at 11:36

## 2021-10-02 RX ADMIN — AMPICILLIN SODIUM AND SULBACTAM SODIUM 200 GRAM(S): 250; 125 INJECTION, POWDER, FOR SUSPENSION INTRAMUSCULAR; INTRAVENOUS at 11:36

## 2021-10-02 RX ADMIN — FINASTERIDE 5 MILLIGRAM(S): 5 TABLET, FILM COATED ORAL at 11:36

## 2021-10-02 RX ADMIN — AMPICILLIN SODIUM AND SULBACTAM SODIUM 200 GRAM(S): 250; 125 INJECTION, POWDER, FOR SUSPENSION INTRAMUSCULAR; INTRAVENOUS at 00:42

## 2021-10-02 RX ADMIN — Medication 100 MILLIGRAM(S): at 05:46

## 2021-10-02 RX ADMIN — Medication 81 MILLIGRAM(S): at 11:36

## 2021-10-02 RX ADMIN — Medication 60 MILLIGRAM(S): at 18:30

## 2021-10-02 NOTE — PROGRESS NOTE ADULT - ASSESSMENT
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 s/p OR debridement today. Nephrology consulted for abrupt rise in SCr.     1. OREN- likely ATN in the setting of infection and ACEi use. Lisinopril discontinued 9/22. ATN resolving, now with stable renal fxn.  s/p Lasix 80mg IV on 9/27 for pulmonary edema/ SOB. Renal function improving with good urine o/p s/p Lasix 40mg IV on 9/30. Mild hypokalemia- will give KCl 40meq PO x1. Monitor lytes.   Kidney/ Bladder US with large distended bladder s/p ibrahim placement on 9/23; renal function did not improve post ibrahim; less likely due to obstructive uropathy. UA with 30 protein, trace blood with small LE  FeNa 0.72% and FeUrea 19.39%; consistent with pre-renal OREN. However renal function worsening on IVF. No peripheral eosinophilia- no signs of AIN. C3 & C4 wnl with neg ASO- no signs of PIGN. Strict I/Os. Avoid nephrotoxins/ NSAIDs/ RCA. Monitor BMP.      2. 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 Will defer secondary w/u as an outpt. Avoid nephrotoxins  3. HTN 2/2 CKD- BP acceptable. Lisinopril d/c due to OREN. c/w Nifedipine ER 60mg PO qd. Monitor BP  4. Acute osteomyelitis- s/p debridement 9/22. Pt on Unasyn & Levaquin. Plan as per ID      [facility] NEPHROLOGY  Bryn Johnson M.D.  Domingo Booth D.O.  Zakia Rodriguez M.D.  Zonia Adams, MSN, ANP-C  [phone]    [address] [place] [id number]

## 2021-10-02 NOTE — PROGRESS NOTE ADULT - SUBJECTIVE AND OBJECTIVE BOX
Podiatry Interval HPI: Pt seen bedside resting comfortably. Pt denies any overnight acute events. Denies pain to R foot. Denies constitutional symptoms No other pedal complaints.    Podiatry HPI: 76 year old male with a PMHx of DM, HTN, HLD, CAD to ED presents to the ED for a Right Foot s/p 5th foot partial ray resection and fillet toe flap. Patient states that he has sharp localized non-radiating pain to the Right foot 5th metatarsal. States that after his surgery, he did not see a podiatrist and he came into the ED because he saw drainage and was having pain. Denies any constitutional symptoms of N/V/C/F/SOB. Denies any other pedal complaints at this time. Denies calf pain today, bilaterally.      Medications ampicillin/sulbactam  IVPB 3 Gram(s) IV Intermittent every 6 hours  aspirin  chewable 81 milliGRAM(s) Oral daily  atorvastatin 80 milliGRAM(s) Oral at bedtime  bisacodyl Suppository 10 milliGRAM(s) Rectal once  chlorhexidine 2% Cloths 1 Application(s) Topical <User Schedule>  cyanocobalamin 1000 MICROGram(s) Oral daily  ergocalciferol 79725 Unit(s) Oral <User Schedule>  ferrous    sulfate 325 milliGRAM(s) Oral daily  finasteride 5 milliGRAM(s) Oral daily  fluconAZOLE IVPB      fluconAZOLE IVPB 100 milliGRAM(s) IV Intermittent every 24 hours  heparin   Injectable 5000 Unit(s) SubCutaneous every 8 hours  influenza   Vaccine 0.5 milliLiter(s) IntraMuscular once  insulin lispro (ADMELOG) corrective regimen sliding scale   SubCutaneous Before meals and at bedtime  levoFLOXacin  Tablet 250 milliGRAM(s) Oral every 24 hours  metoprolol succinate  milliGRAM(s) Oral daily  NIFEdipine XL 60 milliGRAM(s) Oral daily  ondansetron Injectable 4 milliGRAM(s) IV Push three times a day PRN  pantoprazole    Tablet 40 milliGRAM(s) Oral before breakfast  polyethylene glycol 3350 17 Gram(s) Oral daily  senna 2 Tablet(s) Oral at bedtime  sodium chloride 0.9% lock flush 10 milliLiter(s) IV Push every 1 hour PRN  tamsulosin 0.4 milliGRAM(s) Oral at bedtime    FHFamily history of acute myocardial infarction (Father)    Family history of diabetes mellitus (Mother, Sibling)    Family history of hypertension (Mother, Sibling)    Family history of hyperlipidemia (Mother, Sibling)    ,   PMHHTN (hypertension)    HLD (hyperlipidemia)    DM (diabetes mellitus)    CAD (coronary artery disease)    Glaucoma, angle-closure    [place] (hard of hearing)       PSHNo significant past surgical history    S/P CABG (coronary artery bypass graft)    History of thyroid surgery        Labs                          8.7    5.92  )-----------( 315      ( 02 Oct 2021 07:02 )             27.2      10-02    143  |  106  |  20<H>  ----------------------------<  103<H>  3.7   |  26  |  1.49<H>    Ca    8.5      02 Oct 2021 07:02  Phos  2.9     10-01  Mg     2.2     10-01       Vital Signs Last 24 Hrs  T(C): 36.2 (02 Oct 2021 12:40), Max: 36.3 (02 Oct 2021 05:17)  T(F): 97.1 (02 Oct 2021 12:40), Max: 97.3 (02 Oct 2021 05:17)  HR: 69 (02 Oct 2021 12:40) (64 - 78)  BP: 122/56 (02 Oct 2021 12:40) (114/58 - 125/59)  BP(mean): --  RR: 18 (02 Oct 2021 12:40) (17 - 18)  SpO2: 100% (02 Oct 2021 12:40) (89% - 100%)  Sedimentation Rate, Erythrocyte: 108 mm/Hr (10-02-21 @ 07:02)  Sedimentation Rate, Erythrocyte: 77 mm/Hr (09-16-21 @ 16:03)         C-Reactive Protein, Serum: 45 mg/L (09-16-21 @ 23:33)  C-Reactive Protein, Serum: 85 mg/L (08-22-21 @ 21:30)  C-Reactive Protein, Serum: 67 mg/L (08-15-21 @ 11:55)   WBC Count: 5.92 K/uL (10-02-21 @ 07:02)      PHYSICAL EXAM Kept bandage intact   LE Focused:    Vasc:  DP/PT pulses were faintly palpable, bilateral. DP/PT pulses were monophasic on doppler, bilaterally. CFT<3 seconds to all digits.   Derm: Surgical site with graft in place to R 5th ray, granular wound bed through the graft, minimal drainage or discharge. minimal erythema and edema noted, eschar forming over the wound. Dorsal foot wound noted- mild erythema and edema noted. DTI noted to b/l heel  Neuro: Protective sensation grossly diminished, b/l  MSK: 5/5 muscle strength noted to the pedal compartments. 5th digit amputation R foot    Imaging:  INTERPRETATION:  Postop, rule out infection.    3 views right foot. Prior 8/20/2021.    Status post resection of the fifth toe and distal fifth metatarsal unchanged in appearance. No focal bone lysis or unusual periosteal reaction to suggest osteomyelitis. No acute fracture or dislocation. The remainder study unchanged. No soft tissue gas.    IMPRESSION: No acute finding. No plain film evidence of osteomyelitis.        MRI R foot:     INTERPRETATION:  Clinical Information: Recent fifth metatarsal head resection now with fifth digit pain, swelling and foul discharge.    Comparison: Radiographs of the right foot from 9/16/2021 and MRI the right foot from 8/16/2021.    Technique:  MRI of the right midfoot and forefoot.  Intravenous Contrast: None.    Findings:    There is a resection at the mid aspect of the fifth metatarsal shaft. There is a lateral soft tissue wound beginning at the level of the amputation which extends distally. There is susceptibility artifact in the region of the amputation consistent with postoperative change. There is hyperintense T2 marrow signal throughout the remaining fifth metatarsal and within the adjacent fourth metatarsal head which is nonspecific and could be related to recent postoperative changes although osteomyelitis is suspected.    There is edema and mild atrophy within the plantar muscles of the foot. There is minimal spurring at the first metatarsophalangeal and hallux sesamoid articulations.    Impression:  Resection at the mid aspect of the fifth metatarsal. Lateral soft tissue wound with marrow signal abnormality throughout the fifth metatarsal and within the adjacent fourth metatarsal head which is nonspecific in the setting of recent surgery although osteomyelitis is suspected.      Culture Results:   Rare Aeromonas hydrophila/caviae   Few Klebsiella oxytoca/Raoutella ornithinolytica   Few Enterococcus faecalis   Few Streptococcus agalactiae (Group B) isolated   Group B streptococci are susceptible to ampicillin,   penicillin and cefazolin, but may be resistant to   erythromycin and clindamycin.   Recommendations for intrapartum prophylaxis for Group B   streptococci are penicillin or ampicillin. (09.16.21 @ 21:42)     Gram Stain:   No polymorphonuclear cells seen per low power field   No organisms seen per oil power field (09.22.21 @ 13:54)       Historical Values  Gram Stain:   No polymorphonuclear cells seen per low power field   No organisms seen per oil power field (09.22.21 @ 13:54)   Gram Stain:   No polymorphonuclear leukocytes seen per low power field   No organisms seen per oil power field (08.20.21 @ 03:59)

## 2021-10-02 NOTE — PROGRESS NOTE ADULT - SUBJECTIVE AND OBJECTIVE BOX
C A R D I O L O G Y  **********************************     DATE OF SERVICE: 10-02-21    Patient denies chest pain or shortness of breath.   Review of symptoms otherwise negative.    ampicillin/sulbactam  IVPB 3 Gram(s) IV Intermittent every 6 hours  aspirin  chewable 81 milliGRAM(s) Oral daily  atorvastatin 80 milliGRAM(s) Oral at bedtime  bisacodyl Suppository 10 milliGRAM(s) Rectal once  chlorhexidine 2% Cloths 1 Application(s) Topical <User Schedule>  cyanocobalamin 1000 MICROGram(s) Oral daily  ergocalciferol 70344 Unit(s) Oral <User Schedule>  ferrous    sulfate 325 milliGRAM(s) Oral daily  finasteride 5 milliGRAM(s) Oral daily  fluconAZOLE IVPB      fluconAZOLE IVPB 100 milliGRAM(s) IV Intermittent every 24 hours  heparin   Injectable 5000 Unit(s) SubCutaneous every 8 hours  influenza   Vaccine 0.5 milliLiter(s) IntraMuscular once  insulin lispro (ADMELOG) corrective regimen sliding scale   SubCutaneous Before meals and at bedtime  levoFLOXacin  Tablet 250 milliGRAM(s) Oral every 24 hours  metoprolol succinate  milliGRAM(s) Oral daily  NIFEdipine XL 60 milliGRAM(s) Oral daily  ondansetron Injectable 4 milliGRAM(s) IV Push three times a day PRN  pantoprazole    Tablet 40 milliGRAM(s) Oral before breakfast  polyethylene glycol 3350 17 Gram(s) Oral daily  senna 2 Tablet(s) Oral at bedtime  sodium chloride 0.9% lock flush 10 milliLiter(s) IV Push every 1 hour PRN  tamsulosin 0.4 milliGRAM(s) Oral at bedtime                            8.7    5.92  )-----------( 315      ( 02 Oct 2021 07:02 )             27.2       Hemoglobin: 8.7 g/dL (10-02 @ 07:02)  Hemoglobin: 9.1 g/dL (10-01 @ 06:35)  Hemoglobin: 10.1 g/dL (09-30 @ 06:37)  Hemoglobin: 10.3 g/dL (09-29 @ 08:09)  Hemoglobin: 9.7 g/dL (09-28 @ 07:39)      10-02    143  |  106  |  20<H>  ----------------------------<  103<H>  3.7   |  26  |  1.49<H>    Ca    8.5      02 Oct 2021 07:02  Phos  2.9     10-01  Mg     2.2     10-01      Creatinine Trend: 1.49<--, 1.48<--, 1.66<--, 1.73<--, 2.24<--, 3.19<--    COAGS:           T(C): 36.2 (10-02-21 @ 12:40), Max: 36.3 (10-02-21 @ 05:17)  HR: 69 (10-02-21 @ 12:40) (64 - 78)  BP: 122/56 (10-02-21 @ 12:40) (114/58 - 125/59)  RR: 18 (10-02-21 @ 12:40) (17 - 18)  SpO2: 100% (10-02-21 @ 12:40) (89% - 100%)  Wt(kg): --    I&O's Summary    01 Oct 2021 07:01  -  02 Oct 2021 07:00  --------------------------------------------------------  IN: 0 mL / OUT: 740 mL / NET: -740 mL        HEENT:  (-)icterus (-)pallor  CV: N S1 S2 1/6 TRINIDAD (+)2 Pulses B/l  Resp:  Clear to ausculatation B/L, normal effort  GI: (+) BS Soft, NT, ND  Lymph:  (-)Edema, (-)obvious lymphadenopathy  Skin: Warm to touch, Normal turgor  Psych: Appropriate mood and affect      ASSESSMENT/PLAN: 	76y  Male PMH DM on insulin, HTN, HLD, normal lV fx moderate to severe AS 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    - Resolving ATN, nephrology f/u appreciated   - Podiatry f/u noted  - Abx per primary team  - Positive trop noted, likely due to increased demand and renal failure.  - He will need a SABIHA and R+L heart cath once he is medically optimized and infection treated  - Cont medical management of CAD  - oupt cardiac f/u [phone]      Zak Wilkins MD, [facility]  BEEPER [phone]

## 2021-10-02 NOTE — PROGRESS NOTE ADULT - SUBJECTIVE AND OBJECTIVE BOX
`Patient is a 76y old  Male who presents with a chief complaint of R Foot Pain (02 Oct 2021 14:32)    PATIENT IS SEEN AND EXAMINED IN MEDICAL FLOOR.  NGT [    ]    MADDY [   ]      GT [   ]    ALLERGIES:  No Known Allergies      Daily     Daily     VITALS:    Vital Signs Last 24 Hrs  T(C): 36.2 (02 Oct 2021 12:40), Max: 36.3 (02 Oct 2021 05:17)  T(F): 97.1 (02 Oct 2021 12:40), Max: 97.3 (02 Oct 2021 05:17)  HR: 69 (02 Oct 2021 12:40) (64 - 78)  BP: 122/56 (02 Oct 2021 12:40) (114/58 - 122/57)  BP(mean): --  RR: 18 (02 Oct 2021 12:40) (17 - 18)  SpO2: 100% (02 Oct 2021 12:40) (89% - 100%)    LABS:    CBC Full  -  ( 02 Oct 2021 07:02 )  WBC Count : 5.92 K/uL  RBC Count : 2.99 M/uL  Hemoglobin : 8.7 g/dL  Hematocrit : 27.2 %  Platelet Count - Automated : 315 K/uL  Mean Cell Volume : 91.0 fl  Mean Cell Hemoglobin : 29.1 pg  Mean Cell Hemoglobin Concentration : 32.0 gm/dL  Auto Neutrophil # : x  Auto Lymphocyte # : x  Auto Monocyte # : x  Auto Eosinophil # : x  Auto Basophil # : x  Auto Neutrophil % : x  Auto Lymphocyte % : x  Auto Monocyte % : x  Auto Eosinophil % : x  Auto Basophil % : x      10-02    143  |  106  |  20<H>  ----------------------------<  103<H>  3.7   |  26  |  1.49<H>    Ca    8.5      02 Oct 2021 07:02  Phos  2.9     10-01  Mg     2.2     10-01      CAPILLARY BLOOD GLUCOSE      POCT Blood Glucose.: 110 mg/dL (02 Oct 2021 17:13)  POCT Blood Glucose.: 171 mg/dL (02 Oct 2021 11:30)  POCT Blood Glucose.: 113 mg/dL (02 Oct 2021 07:47)  POCT Blood Glucose.: 129 mg/dL (01 Oct 2021 21:35)          Creatinine Trend: 1.49<--, 1.48<--, 1.66<--, 1.73<--, 2.24<--, 3.19<--  I&O's Summary    01 Oct 2021 07:01  -  02 Oct 2021 07:00  --------------------------------------------------------  IN: 0 mL / OUT: 740 mL / NET: -740 mL            .Tissue Right 5th toe r/o osteomyeltis  09-22 @ 13:54   No growth at 5 days  --    No polymorphonuclear cells seen per low power field  No organisms seen per oil power field      .Blood Blood-Venous  09-17 @ 10:28   No Growth Final  --  --      .Surgical Swab right foot wound  09-16 @ 21:42   Culture yields >4 types of aerobic and/or anaerobic bacteria  Call client services within 7 days if further workup is clinically  indicated. Culture includes  Rare Aeromonas hydrophila/caviae  Few Klebsiella oxytoca/Raoutella ornithinolytica  Few Enterococcus faecalis  Few Streptococcus agalactiae (Group B) isolated  Group B streptococci are susceptible to ampicillin,  penicillin and cefazolin, but may be resistant to  erythromycin and clindamycin.  Recommendations for intrapartum prophylaxis for Group B  streptococci are penicillin or ampicillin.  --  Aeromonas hydrophila/caviae  Klebsiella oxytoca /Raoutella ornithinolytica  Enterococcus faecalis      Bone R 5th metatarsal bone clean ma  08-20 @ 03:59   No growth at 5 days  --    No polymorphonuclear leukocytes seen per low power field  No organisms seen per oil power field      .Blood Blood-Venous  08-14 @ 21:09   No Growth Final  --  --      .Surgical Swab Right foot plantar wound  08-14 @ 21:08   Moderate Streptococcus agalactiae (Group B) isolated  Group B streptococci are susceptible to ampicillin,  penicillin and cefazolin, but may be resistant to  erythromycin and clindamycin.  Recommendations for intrapartum prophylaxis for Group B  streptococci are penicillin or ampicillin.  Moderate Bacteroides fragilis "Susceptibilities not performed"  Normal skin filiberto isolated  --  --          MEDICATIONS:    MEDICATIONS  (STANDING):  ampicillin/sulbactam  IVPB 3 Gram(s) IV Intermittent every 6 hours  aspirin  chewable 81 milliGRAM(s) Oral daily  atorvastatin 80 milliGRAM(s) Oral at bedtime  bisacodyl Suppository 10 milliGRAM(s) Rectal once  chlorhexidine 2% Cloths 1 Application(s) Topical <User Schedule>  cyanocobalamin 1000 MICROGram(s) Oral daily  ergocalciferol 73087 Unit(s) Oral <User Schedule>  ferrous    sulfate 325 milliGRAM(s) Oral daily  finasteride 5 milliGRAM(s) Oral daily  fluconAZOLE IVPB      fluconAZOLE IVPB 100 milliGRAM(s) IV Intermittent every 24 hours  heparin   Injectable 5000 Unit(s) SubCutaneous every 8 hours  influenza   Vaccine 0.5 milliLiter(s) IntraMuscular once  insulin lispro (ADMELOG) corrective regimen sliding scale   SubCutaneous Before meals and at bedtime  levoFLOXacin  Tablet 250 milliGRAM(s) Oral every 24 hours  metoprolol succinate  milliGRAM(s) Oral daily  NIFEdipine XL 60 milliGRAM(s) Oral daily  pantoprazole    Tablet 40 milliGRAM(s) Oral before breakfast  polyethylene glycol 3350 17 Gram(s) Oral daily  senna 2 Tablet(s) Oral at bedtime  tamsulosin 0.4 milliGRAM(s) Oral at bedtime      MEDICATIONS  (PRN):  ondansetron Injectable 4 milliGRAM(s) IV Push three times a day PRN Nausea and/or Vomiting  sodium chloride 0.9% lock flush 10 milliLiter(s) IV Push every 1 hour PRN Pre/post blood products, medications, blood draw, and to maintain line patency      REVIEW OF SYSTEMS:                           ALL ROS DONE [ X   ]    CONSTITUTIONAL:  LETHARGIC [   ], FEVER [   ], UNRESPONSIVE [   ]  CVS:  CP  [   ], SOB, [   ], PALPITATIONS [   ], DIZZYNESS [   ]  RS: COUGH [   ], SPUTUM [   ]  GI: ABDOMINAL PAIN [   ], NAUSEA [   ], VOMITINGS [   ], DIARRHEA [   ], CONSTIPATION [   ]  :  DYSURIA [   ], NOCTURIA [   ], INCREASED FREQUENCY [   ], DRIBLING [   ],  SKELETAL: PAINFUL JOINTS [   ], SWOLLEN JOINTS [   ], NECK ACHE [   ], LOW BACK ACHE [   ],  SKIN : ULCERS [   ], RASH [   ], ITCHING [   ]  CNS: HEAD ACHE [   ], DOUBLE VISION [   ], BLURRED VISION [   ], AMS / CONFUSION [   ], SEIZURES [   ], WEAKNESS [   ],TINGLING / NUMBNESS [   ]    PHYSICAL EXAMINATION:  GENERAL APPEARANCE: NO DISTRESS  HEENT:  NO PALLOR, NO  JVD,  NO   NODES, NECK SUPPLE  CVS: S1 +, S2 +,   RS: AEEB,  OCCASIONAL  RALES +,   NO RONCHI, CRACKLES +  ABD: SOFT, NT, NO, BS +     ;  DISTENDED +  EXT: NO PE  SKIN: WARM,   RIGHT FOOT WRAPPED W/ WOUND VAC IN PLACE  SKELETAL:  ROM ACCEPTABLE  CNS:  AAO X  2     RADIOLOGY :    EXAM:  CT ABDOMEN AND PELVIS OC                            PROCEDURE DATE:  09/27/2021          INTERPRETATION:  CLINICAL INFORMATION: Small bowel obstruction.    COMPARISON: None.    CONTRAST/COMPLICATIONS:  IV Contrast: NONE  Oral Contrast: Omnipaque 300  Complications: None reported at time of study completion    PROCEDURE:  CT of the Abdomen and Pelvis was performed.  Sagittal and coronal reformats were performed.    FINDINGS:  LOWER CHEST: Small bilateral pleural effusions with compressiveatelectasis of both lower lobes.    LIVER: Within normal limits.  BILE DUCTS: Normal caliber.  GALLBLADDER: Distended. Small layering gallstones.  SPLEEN: Within normal limits.  PANCREAS: Within normal limits.  ADRENALS: Within normal limits.  KIDNEYS/URETERS: No hydronephrosis. Nonobstructing left upper pole calculus, measuring 0.6 cm.    BLADDER: Urinary bladder contains air and a Castañeda catheter balloon.  REPRODUCTIVE ORGANS: Prostate is not enlarged.    BOWEL: No bowel obstruction. Appendix isnormal. Colonic diverticulosis.  PERITONEUM: Mild presacral fluid.  VESSELS: Atherosclerotic changes.  RETROPERITONEUM/LYMPH NODES: No lymphadenopathy.  ABDOMINAL WALL: Within normal limits.  BONES: Degenerative changes. Sternotomy.    IMPRESSION:  No acute intra-abdominal pathology.    Small bilateral pleural effusions with compressive atelectasis of both lower lobes.    ====================================================    EXAM:  MR FOOT RT                            PROCEDURE DATE:  09/17/2021          INTERPRETATION:  Clinical Information: Recent fifth metatarsal head resection now with fifth digit pain, swelling and foul discharge.    Comparison: Radiographs of the right foot from 9/16/2021 and MRI the right foot from 8/16/2021.    Technique:  MRI of the right midfoot and forefoot.  Intravenous Contrast: None.    Findings:    There is a resection at the mid aspect of the fifth metatarsal shaft. There is a lateral soft tissue wound beginning at the level of the amputation which extends distally. There is susceptibility artifact in the region of the amputation consistent with postoperative change. There is hyperintense T2 marrow signal throughout the remaining fifth metatarsal and within the adjacent fourth metatarsal head which is nonspecific and could be related to recent postoperative changes although osteomyelitis is suspected.    There is edema and mild atrophy within the plantar muscles of the foot. There is minimal spurring at the first metatarsophalangeal and hallux sesamoid articulations.    Impression:  Resection at the mid aspect of the fifth metatarsal. Lateral soft tissue wound with marrow signal abnormality throughout the fifth metatarsal and within the adjacent fourth metatarsal head which is nonspecific in the setting of recent surgery although osteomyelitis is suspected.        ASSESSMENT :     Headache    HTN (hypertension)    HLD (hyperlipidemia)    DM (diabetes mellitus)    CAD (coronary artery disease)    Glaucoma, angle-closure    [place] (hard of hearing)    No significant past surgical history    S/P CABG (coronary artery bypass graft)    History of thyroid surgery        PLAN:  HPI:  76M from home, lives with daughter w/ PMH DM on insulin, HTN, HLD, CAD, 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 Pt with sharp pain on the R lateral foot. As per daughter, pt was taking tylenol which did not help his pain prompting the patient to ask his daughter to take him to the hospital. Pt is a poor historian. Daughter states that the patient did not see his podiatrist after the surgery. No fever, chest, pain, palpitations, nausea, vomiting, diarrhea (17 Sep 2021 01:11)    # PATIENT FOR D/C TO [facility], [place] FOR [facility] - D/W CM TEAM 10/2, AWAITING AUTHORIZATION / ANNETTA PER CM TEAM.    # SUSPECT RIGHT FOOT OSTEOMYELITIS S/P RIGHT 5TH RAY RESECTION [8/19] AND S/P DEBRIDEMENT, GRAFT APPLICATION, BONE BX AND WOUND VAC APPLICATION 9/22  ** RECENT ADMISSION FOR RIGHT DIABETIC FOOT ULCER, CELLULITIS, OSTEOMYELITIS RIGHT 5th METATARSAL S/P RIGHT 5TH PARTIAL RAY AMPUTATION [8/20] - BONE PATHOLOGY W/ CLEAN MARGINS    - S/P VANCOMYCIN AND ZOSYN ; NOW ON UNASYN AND LEVAQUIN GIVEN OREN; PER ID SWITCH TO ZOSYN UNTIL 11/3  - RECENTLY HAD SIMON W/ MILD PAD  - REVIEWED WOUND CX [ ENTEROCOCCUS, AEROMONAS, KLEBSIELLA] AND BCX  - BONE BX - acute osteomyelitis and necrotic periosteal tissue.   - ID CONSULT, PODIATRY CONSULT    - PICC LINE PLACED TODAY TO COMPLETE 6 WEEKS OF ANTIBIOTICS - PER ID SWITCH TO ZOSYN UNTIL 11/3    # ACUTE HYPOXIC RESPIRATORY FAILURE SUSPECT S/T PULMONARY EDEMA IN THE SETTING OF SEVERE AORTIC STENOSIS + ASPIRATION PNA - ON LEVAQUIN, STARTED LASIX, CARDIOLOGY CONSULT IN PROGRESS  - S/P CRITICAL CARE CONSULT  - WILL GIVE LASIX [9/30]`  - REPEAT CXR IN A.M. NOTED TO HAVE EFFUSION    # FUNGURIA - ON FLUCONAZOLE    # BOWEL DISTENTION - ? ILEUS - OPTIMIZING ELECTROLYTES - IMPROVED  - SURGERY CONSULT IN PROGRESS  - PASSED 2 BMS ON 9/27    # ASPIRATION EVENT WHEN PATIENT REMOVED NGT - ON LEVAQUIN, ID CONSULT IN PROGRESS    # CHOLELITHIASIS - TRENDING LFTS, RUQ U/S - CHOLELITHIASIS, SURGERY CONSULT IN PROGRESS  - GI CONSULT IN PROGRESS - LESS SUSPICIOUS FOR CHOLECYSTITIS    # ? DYSPEPSIA - PLACED ON PPI BID, IMPROVED, UNDERWENT ST EVAL - TOLERATING LIQUID DIET TODAY    # ELEVATED TROPONINS - NSTEMI - ? DEMAND - WILL NEED ISCHEMIC EVAL ONCE ACUTE INFECTION RESOLVES PER CARDIOLOGY, CARDIOLOGY CONSULT IN PROGRESS  - ON ASA, STATIN, BB    # OREN ON CKD - S/T ATN AND URINARY RETENTION - S/P IVF, NOW W/ CASTAÑEDA, NEPHROLOGY CONUSLT IN PROGRESS  - ON FLOMAX, ADDED FINASTERIDE    # MEDICAL CLEARANCE FOR SURGERY - RCRI - 2 - 10.1 % 30 DAY RISK OF DEATH, MI OR CARDIAC ARREST  - CARDIOLOGY CONSULT     # DM -  HBA1C - 11  - LANTUS, SSI + FS    # CAD S/P CABG - PLACED ON ASA, STATIN, BB  - ECHO - SEVERE AORTIC STENOSIS, SEVERE CONCENTRIC LVH, G1DD, MILD TN  - CARDIOLOGY CONSULT - DR. BASSETT   - MAY NEED ISCHEMIC EVAL ONCE ACUTE ILLNESS RESOLVES    # HTN - ON METOPROLOL, NICARDIPINE    # SEVERE, ASYMPTOMATIC, STENOSIS - ONCE ACUTE ILLNESS RESOLVES, WILL LIKELY NEED ISCHEMIC WORKUP, SABIHA TO EVALUATE FURTHER. D/W PATIENT, DAUGHTER AND CARDIOLOGY TEAM [PREVIOUSLY SAW DR. BASSETT]    # VITAMIN D DEFICIENCY - ON CHOLECALCIFEROL    # HLD - STATIN    # CASE DISCUSSED AT LENGTH WITH PATIENT AND DAUGHTER, BIRDIE ROBERT AT BEDSIDE AND VIA PHONE @ [phone] [10/1]  # PATIENT AND DAUGHTER REFUSED [facility] ON PREVIOUS ADMISSION, PREVIOUSLY WISHED FOR PATIENT RETURN HOME W/ HOME PT; HOWEVER NOW, DAUGHTER WISHES FOR PATIENT TO GO TO [facility] [place], AS PATIENT'S WIFE IS CURRENTLY ADMITTED THERE    # GI AND DVT PPX

## 2021-10-02 NOTE — PROGRESS NOTE ADULT - ASSESSMENT
A:   s/p R 5th ray resection - 8/19  s/p R 5th wound debridement, graft application, bone biopsy and wound vac application 9/22     P:  Patient evaluated, chart reviewed  Xrays reviewed  ESR/CRP reviewed   MRI reviewed   Intra-op culture - NGTD  intra-op bone biopsy - NGTD  Wound Vac was changed today (10/1) - will change Sunday  Recommend elevation of b/l legs   Continue local wound care to allow graft incorporation  Appreciate ID consult- continue abx per ID   Podiatry will follow while in house  Discussed with Dr. Vazquez A:   s/p R 5th ray resection - 8/19  s/p R 5th wound debridement, graft application, bone biopsy and wound vac application 9/22     P:  Patient evaluated, chart reviewed  Xrays reviewed  ESR/CRP reviewed   MRI reviewed   Intra-op culture - NGTD  intra-op bone biopsy - NGTD  Wound Vac was changed today (10/1) - will change Sunday  Recommend elevation of b/l legs   Continue local wound care to allow graft incorporation  Appreciate ID consult- continue abx per ID   Pt stable from podiatry  Case management recommended for wound vac   Upon discharge:  Please keep dressing clean, dry and intact  Pt nonwb to R foot  Change wound vac every other day to R 5th ray surgical site   Apply santyl, webril, ACE to R foot  Pt to follow up outpatient clinic at [address] [facility] [address]. Please call [phone] to make appointment  Discussed with Dr. Vazquez

## 2021-10-02 NOTE — PROGRESS NOTE ADULT - SUBJECTIVE AND OBJECTIVE BOX
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 s/p OR debridement today. Nephrology consulted for abrupt rise in SCr.   s/p ibrahim placement for distended bladder on 9/23 with ~400ml of urine o/p  9/27- pt with SOB; CXR with pulmonary edema- pt given Lasix 80mg IV x1. Concern for aspiration PNA    Hospital Medications: Medications reviewed.  REVIEW OF SYSTEMS:  CONSTITUTIONAL: No fevers or chills  RESPIRATORY: no shortness of breath  CARDIOVASCULAR: No chest pain.  GASTROINTESTINAL: No nausea or vomiting, ordiarrhea or abdominal pain    VASCULAR: No bilateral lower extremity edema. Rt foot - denies pain    VITALS:  T(F): 97.3 (10-02-21 @ 05:17), Max: 98.6 (10-01-21 @ 12:54)  HR: 78 (10-02-21 @ 05:17)  BP: 114/58 (10-02-21 @ 05:17)  RR: 18 (10-02-21 @ 05:17)  SpO2: 93% (10-02-21 @ 05:17)  Wt(kg): --    10-01 @ 07:01  -  10-02 @ 07:00  --------------------------------------------------------  IN: 0 mL / OUT: 740 mL / NET: -740 mL      PHYSICAL EXAM:  Gen: NAD, calm  HEENT: MMM  Neck: no JVD  Cards: RRR, +S1/S2, +TRINIDAD  Resp: CTA ant  GI: soft, NT  Extremities: no LE edema B/L  : +ibrahim  Derm: Rt foot wrapped/ wound vac    LABS:  10-02    143  |  106  |  20<H>  ----------------------------<  103<H>  3.7   |  26  |  1.49<H>    Ca    8.5      02 Oct 2021 07:02  Phos  2.9     10-01  Mg     2.2     10-01      Creatinine Trend: 1.49 <--, 1.48 <--, 1.66 <--, 1.73 <--, 2.24 <--, 3.19 <--, 3.83 <--                        8.7    5.92  )-----------( 315      ( 02 Oct 2021 07:02 )             27.2

## 2021-10-03 LAB
ANION GAP SERPL CALC-SCNC: 12 MMOL/L — SIGNIFICANT CHANGE UP (ref 5–17)
BUN SERPL-MCNC: 22 MG/DL — HIGH (ref 7–18)
CALCIUM SERPL-MCNC: 8.6 MG/DL — SIGNIFICANT CHANGE UP (ref 8.4–10.5)
CHLORIDE SERPL-SCNC: 105 MMOL/L — SIGNIFICANT CHANGE UP (ref 96–108)
CO2 SERPL-SCNC: 26 MMOL/L — SIGNIFICANT CHANGE UP (ref 22–31)
CREAT SERPL-MCNC: 1.7 MG/DL — HIGH (ref 0.5–1.3)
GLUCOSE BLDC GLUCOMTR-MCNC: 107 MG/DL — HIGH (ref 70–99)
GLUCOSE BLDC GLUCOMTR-MCNC: 109 MG/DL — HIGH (ref 70–99)
GLUCOSE BLDC GLUCOMTR-MCNC: 112 MG/DL — HIGH (ref 70–99)
GLUCOSE BLDC GLUCOMTR-MCNC: 125 MG/DL — HIGH (ref 70–99)
GLUCOSE SERPL-MCNC: 98 MG/DL — SIGNIFICANT CHANGE UP (ref 70–99)
HCT VFR BLD CALC: 27.1 % — LOW (ref 39–50)
HGB BLD-MCNC: 8.7 G/DL — LOW (ref 13–17)
MCHC RBC-ENTMCNC: 28.9 PG — SIGNIFICANT CHANGE UP (ref 27–34)
MCHC RBC-ENTMCNC: 32.1 GM/DL — SIGNIFICANT CHANGE UP (ref 32–36)
MCV RBC AUTO: 90 FL — SIGNIFICANT CHANGE UP (ref 80–100)
NRBC # BLD: 0 /100 WBCS — SIGNIFICANT CHANGE UP (ref 0–0)
PLATELET # BLD AUTO: 301 K/UL — SIGNIFICANT CHANGE UP (ref 150–400)
POTASSIUM SERPL-MCNC: 3.7 MMOL/L — SIGNIFICANT CHANGE UP (ref 3.5–5.3)
POTASSIUM SERPL-SCNC: 3.7 MMOL/L — SIGNIFICANT CHANGE UP (ref 3.5–5.3)
RBC # BLD: 3.01 M/UL — LOW (ref 4.2–5.8)
RBC # FLD: 13.8 % — SIGNIFICANT CHANGE UP (ref 10.3–14.5)
SODIUM SERPL-SCNC: 143 MMOL/L — SIGNIFICANT CHANGE UP (ref 135–145)
WBC # BLD: 6.13 K/UL — SIGNIFICANT CHANGE UP (ref 3.8–10.5)
WBC # FLD AUTO: 6.13 K/UL — SIGNIFICANT CHANGE UP (ref 3.8–10.5)

## 2021-10-03 RX ORDER — SODIUM CHLORIDE 9 MG/ML
1000 INJECTION INTRAMUSCULAR; INTRAVENOUS; SUBCUTANEOUS
Refills: 0 | Status: DISCONTINUED | OUTPATIENT
Start: 2021-10-03 | End: 2021-10-03

## 2021-10-03 RX ADMIN — HEPARIN SODIUM 5000 UNIT(S): 5000 INJECTION INTRAVENOUS; SUBCUTANEOUS at 05:43

## 2021-10-03 RX ADMIN — AMPICILLIN SODIUM AND SULBACTAM SODIUM 200 GRAM(S): 250; 125 INJECTION, POWDER, FOR SUSPENSION INTRAMUSCULAR; INTRAVENOUS at 17:38

## 2021-10-03 RX ADMIN — Medication 60 MILLIGRAM(S): at 05:43

## 2021-10-03 RX ADMIN — Medication 100 MILLIGRAM(S): at 05:43

## 2021-10-03 RX ADMIN — TAMSULOSIN HYDROCHLORIDE 0.4 MILLIGRAM(S): 0.4 CAPSULE ORAL at 23:08

## 2021-10-03 RX ADMIN — Medication 325 MILLIGRAM(S): at 12:04

## 2021-10-03 RX ADMIN — AMPICILLIN SODIUM AND SULBACTAM SODIUM 200 GRAM(S): 250; 125 INJECTION, POWDER, FOR SUSPENSION INTRAMUSCULAR; INTRAVENOUS at 12:04

## 2021-10-03 RX ADMIN — ERGOCALCIFEROL 50000 UNIT(S): 1.25 CAPSULE ORAL at 14:52

## 2021-10-03 RX ADMIN — AMPICILLIN SODIUM AND SULBACTAM SODIUM 200 GRAM(S): 250; 125 INJECTION, POWDER, FOR SUSPENSION INTRAMUSCULAR; INTRAVENOUS at 05:43

## 2021-10-03 RX ADMIN — PANTOPRAZOLE SODIUM 40 MILLIGRAM(S): 20 TABLET, DELAYED RELEASE ORAL at 06:08

## 2021-10-03 RX ADMIN — AMPICILLIN SODIUM AND SULBACTAM SODIUM 200 GRAM(S): 250; 125 INJECTION, POWDER, FOR SUSPENSION INTRAMUSCULAR; INTRAVENOUS at 23:28

## 2021-10-03 RX ADMIN — ATORVASTATIN CALCIUM 80 MILLIGRAM(S): 80 TABLET, FILM COATED ORAL at 23:09

## 2021-10-03 RX ADMIN — FLUCONAZOLE 100 MILLIGRAM(S): 150 TABLET ORAL at 23:53

## 2021-10-03 RX ADMIN — HEPARIN SODIUM 5000 UNIT(S): 5000 INJECTION INTRAVENOUS; SUBCUTANEOUS at 22:36

## 2021-10-03 RX ADMIN — FINASTERIDE 5 MILLIGRAM(S): 5 TABLET, FILM COATED ORAL at 12:04

## 2021-10-03 RX ADMIN — Medication 81 MILLIGRAM(S): at 12:04

## 2021-10-03 RX ADMIN — PREGABALIN 1000 MICROGRAM(S): 225 CAPSULE ORAL at 12:04

## 2021-10-03 RX ADMIN — CHLORHEXIDINE GLUCONATE 1 APPLICATION(S): 213 SOLUTION TOPICAL at 05:44

## 2021-10-03 NOTE — PROGRESS NOTE ADULT - SUBJECTIVE AND OBJECTIVE BOX
[facility]  **********************************    DATE OF SERVICE: 10-03-21    Patient denies chest pain or shortness of breath.   Review of symptoms otherwise negative.    ampicillin/sulbactam  IVPB 3 Gram(s) IV Intermittent every 6 hours  aspirin  chewable 81 milliGRAM(s) Oral daily  atorvastatin 80 milliGRAM(s) Oral at bedtime  bisacodyl Suppository 10 milliGRAM(s) Rectal once  chlorhexidine 2% Cloths 1 Application(s) Topical <User Schedule>  cyanocobalamin 1000 MICROGram(s) Oral daily  ergocalciferol 32979 Unit(s) Oral <User Schedule>  ferrous    sulfate 325 milliGRAM(s) Oral daily  finasteride 5 milliGRAM(s) Oral daily  fluconAZOLE IVPB      fluconAZOLE IVPB 100 milliGRAM(s) IV Intermittent every 24 hours  heparin   Injectable 5000 Unit(s) SubCutaneous every 8 hours  influenza   Vaccine 0.5 milliLiter(s) IntraMuscular once  insulin lispro (ADMELOG) corrective regimen sliding scale   SubCutaneous Before meals and at bedtime  levoFLOXacin  Tablet 250 milliGRAM(s) Oral every 24 hours  metoprolol succinate  milliGRAM(s) Oral daily  NIFEdipine XL 60 milliGRAM(s) Oral daily  ondansetron Injectable 4 milliGRAM(s) IV Push three times a day PRN  pantoprazole    Tablet 40 milliGRAM(s) Oral before breakfast  polyethylene glycol 3350 17 Gram(s) Oral daily  senna 2 Tablet(s) Oral at bedtime  sodium chloride 0.9% lock flush 10 milliLiter(s) IV Push every 1 hour PRN  tamsulosin 0.4 milliGRAM(s) Oral at bedtime                            8.7    6.13  )-----------( 301      ( 03 Oct 2021 05:58 )             27.1       Hemoglobin: 8.7 g/dL (10-03 @ 05:58)  Hemoglobin: 8.7 g/dL (10-02 @ 07:02)  Hemoglobin: 9.1 g/dL (10-01 @ 06:35)  Hemoglobin: 10.1 g/dL (09-30 @ 06:37)  Hemoglobin: 10.3 g/dL (09-29 @ 08:09)      10-03    143  |  105  |  22<H>  ----------------------------<  98  3.7   |  26  |  1.70<H>    Ca    8.6      03 Oct 2021 05:58      Creatinine Trend: 1.70<--, 1.49<--, 1.48<--, 1.66<--, 1.73<--, 2.24<--    COAGS:           T(C): 36.3 (10-03-21 @ 12:46), Max: 36.6 (10-03-21 @ 05:12)  HR: 76 (10-03-21 @ 12:46) (71 - 76)  BP: 110/56 (10-03-21 @ 12:46) (110/56 - 131/62)  RR: 16 (10-03-21 @ 12:46) (16 - 18)  SpO2: 95% (10-03-21 @ 12:46) (92% - 96%)  Wt(kg): --    I&O's Summary      HEENT:  (-)icterus (-)pallor  CV: N S1 S2 1/6 TRINIDAD (+)2 Pulses B/l  Resp:  Clear to ausculatation B/L, normal effort  GI: (+) BS Soft, NT, ND  Lymph:  (-)Edema, (-)obvious lymphadenopathy  Skin: Warm to touch, Normal turgor  Psych: Appropriate mood and affect      ASSESSMENT/PLAN: 	76y  Male PMH DM on insulin, HTN, HLD, normal lV fx moderate to severe AS 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    - Resolving ATN, nephrology f/u appreciated   - Podiatry f/u noted  - Abx per primary team  - Positive trop noted, likely due to increased demand and renal failure.  - He will need a SABIHA and R+L heart cath once he is medically optimized and infection treated  - Cont medical management of CAD  - oupt cardiac f/u [phone]      Zak Wilkins MD, [facility]  [place] [phone]

## 2021-10-03 NOTE — PROGRESS NOTE ADULT - SUBJECTIVE AND OBJECTIVE BOX
Patient is a 76y old  Male who presents with a chief complaint of R Foot Pain (03 Oct 2021 10:08)    PATIENT IS SEEN AND EXAMINED IN MEDICAL FLOOR.  KEENANT [    ]    MADDY [   ]      GT [   ]    ALLERGIES:  No Known Allergies      Daily     Daily     VITALS:    Vital Signs Last 24 Hrs  T(C): 36.6 (03 Oct 2021 05:12), Max: 36.6 (03 Oct 2021 05:12)  T(F): 97.8 (03 Oct 2021 05:12), Max: 97.8 (03 Oct 2021 05:12)  HR: 76 (03 Oct 2021 05:12) (69 - 76)  BP: 131/62 (03 Oct 2021 05:12) (119/55 - 131/62)  BP(mean): --  RR: 17 (03 Oct 2021 05:12) (17 - 18)  SpO2: 92% (03 Oct 2021 05:12) (89% - 100%)    LABS:    CBC Full  -  ( 03 Oct 2021 05:58 )  WBC Count : 6.13 K/uL  RBC Count : 3.01 M/uL  Hemoglobin : 8.7 g/dL  Hematocrit : 27.1 %  Platelet Count - Automated : 301 K/uL  Mean Cell Volume : 90.0 fl  Mean Cell Hemoglobin : 28.9 pg  Mean Cell Hemoglobin Concentration : 32.1 gm/dL  Auto Neutrophil # : x  Auto Lymphocyte # : x  Auto Monocyte # : x  Auto Eosinophil # : x  Auto Basophil # : x  Auto Neutrophil % : x  Auto Lymphocyte % : x  Auto Monocyte % : x  Auto Eosinophil % : x  Auto Basophil % : x      10-03    143  |  105  |  22<H>  ----------------------------<  98  3.7   |  26  |  1.70<H>    Ca    8.6      03 Oct 2021 05:58      CAPILLARY BLOOD GLUCOSE      POCT Blood Glucose.: 107 mg/dL (03 Oct 2021 07:42)  POCT Blood Glucose.: 112 mg/dL (02 Oct 2021 21:54)  POCT Blood Glucose.: 110 mg/dL (02 Oct 2021 17:13)  POCT Blood Glucose.: 171 mg/dL (02 Oct 2021 11:30)          Creatinine Trend: 1.70<--, 1.49<--, 1.48<--, 1.66<--, 1.73<--, 2.24<--  I&O's Summary          .Tissue Right 5th toe r/o osteomyeltis  09-22 @ 13:54   No growth at 5 days  --    No polymorphonuclear cells seen per low power field  No organisms seen per oil power field      .Blood Blood-Venous  09-17 @ 10:28   No Growth Final  --  --      .Surgical Swab right foot wound  09-16 @ 21:42   Culture yields >4 types of aerobic and/or anaerobic bacteria  Call client services within 7 days if further workup is clinically  indicated. Culture includes  Rare Aeromonas hydrophila/caviae  Few Klebsiella oxytoca/Raoutella ornithinolytica  Few Enterococcus faecalis  Few Streptococcus agalactiae (Group B) isolated  Group B streptococci are susceptible to ampicillin,  penicillin and cefazolin, but may be resistant to  erythromycin and clindamycin.  Recommendations for intrapartum prophylaxis for Group B  streptococci are penicillin or ampicillin.  --  Aeromonas hydrophila/caviae  Klebsiella oxytoca /Raoutella ornithinolytica  Enterococcus faecalis      Bone R 5th metatarsal bone clean ma  08-20 @ 03:59   No growth at 5 days  --    No polymorphonuclear leukocytes seen per low power field  No organisms seen per oil power field      .Blood Blood-Venous  08-14 @ 21:09   No Growth Final  --  --      .Surgical Swab Right foot plantar wound  08-14 @ 21:08   Moderate Streptococcus agalactiae (Group B) isolated  Group B streptococci are susceptible to ampicillin,  penicillin and cefazolin, but may be resistant to  erythromycin and clindamycin.  Recommendations for intrapartum prophylaxis for Group B  streptococci are penicillin or ampicillin.  Moderate Bacteroides fragilis "Susceptibilities not performed"  Normal skin filiberto isolated  --  --          MEDICATIONS:    MEDICATIONS  (STANDING):  ampicillin/sulbactam  IVPB 3 Gram(s) IV Intermittent every 6 hours  aspirin  chewable 81 milliGRAM(s) Oral daily  atorvastatin 80 milliGRAM(s) Oral at bedtime  bisacodyl Suppository 10 milliGRAM(s) Rectal once  chlorhexidine 2% Cloths 1 Application(s) Topical <User Schedule>  cyanocobalamin 1000 MICROGram(s) Oral daily  ergocalciferol 32244 Unit(s) Oral <User Schedule>  ferrous    sulfate 325 milliGRAM(s) Oral daily  finasteride 5 milliGRAM(s) Oral daily  fluconAZOLE IVPB      fluconAZOLE IVPB 100 milliGRAM(s) IV Intermittent every 24 hours  heparin   Injectable 5000 Unit(s) SubCutaneous every 8 hours  influenza   Vaccine 0.5 milliLiter(s) IntraMuscular once  insulin lispro (ADMELOG) corrective regimen sliding scale   SubCutaneous Before meals and at bedtime  levoFLOXacin  Tablet 250 milliGRAM(s) Oral every 24 hours  metoprolol succinate  milliGRAM(s) Oral daily  NIFEdipine XL 60 milliGRAM(s) Oral daily  pantoprazole    Tablet 40 milliGRAM(s) Oral before breakfast  polyethylene glycol 3350 17 Gram(s) Oral daily  senna 2 Tablet(s) Oral at bedtime  tamsulosin 0.4 milliGRAM(s) Oral at bedtime      MEDICATIONS  (PRN):  ondansetron Injectable 4 milliGRAM(s) IV Push three times a day PRN Nausea and/or Vomiting  sodium chloride 0.9% lock flush 10 milliLiter(s) IV Push every 1 hour PRN Pre/post blood products, medications, blood draw, and to maintain line patency      REVIEW OF SYSTEMS:                           ALL ROS DONE [ X   ]    CONSTITUTIONAL:  LETHARGIC [   ], FEVER [   ], UNRESPONSIVE [   ]  CVS:  CP  [   ], SOB, [   ], PALPITATIONS [   ], DIZZYNESS [   ]  RS: COUGH [   ], SPUTUM [   ]  GI: ABDOMINAL PAIN [   ], NAUSEA [   ], VOMITINGS [   ], DIARRHEA [   ], CONSTIPATION [   ]  :  DYSURIA [   ], NOCTURIA [   ], INCREASED FREQUENCY [   ], DRIBLING [   ],  SKELETAL: PAINFUL JOINTS [   ], SWOLLEN JOINTS [   ], NECK ACHE [   ], LOW BACK ACHE [   ],  SKIN : ULCERS [   ], RASH [   ], ITCHING [   ]  CNS: HEAD ACHE [   ], DOUBLE VISION [   ], BLURRED VISION [   ], AMS / CONFUSION [   ], SEIZURES [   ], WEAKNESS [   ],TINGLING / NUMBNESS [   ]      PHYSICAL EXAMINATION:  GENERAL APPEARANCE: NO DISTRESS  HEENT:  NO PALLOR, NO  JVD,  NO   NODES, NECK SUPPLE  CVS: S1 +, S2 +,   RS: AEEB,  OCCASIONAL  RALES +,   NO RONCHI, CRACKLES +  ABD: SOFT, NT, NO, BS +     ;  DISTENDED +  EXT: NO PE  SKIN: WARM,   RIGHT FOOT WRAPPED W/ WOUND VAC IN PLACE  SKELETAL:  ROM ACCEPTABLE  CNS:  AAO X  2     RADIOLOGY :    EXAM:  CT ABDOMEN AND PELVIS OC                            PROCEDURE DATE:  09/27/2021          INTERPRETATION:  CLINICAL INFORMATION: Small bowel obstruction.    COMPARISON: None.    CONTRAST/COMPLICATIONS:  IV Contrast: NONE  Oral Contrast: Omnipaque 300  Complications: None reported at time of study completion    PROCEDURE:  CT of the Abdomen and Pelvis was performed.  Sagittal and coronal reformats were performed.    FINDINGS:  LOWER CHEST: Small bilateral pleural effusions with compressiveatelectasis of both lower lobes.    LIVER: Within normal limits.  BILE DUCTS: Normal caliber.  GALLBLADDER: Distended. Small layering gallstones.  SPLEEN: Within normal limits.  PANCREAS: Within normal limits.  ADRENALS: Within normal limits.  KIDNEYS/URETERS: No hydronephrosis. Nonobstructing left upper pole calculus, measuring 0.6 cm.    BLADDER: Urinary bladder contains air and a Castañeda catheter balloon.  REPRODUCTIVE ORGANS: Prostate is not enlarged.    BOWEL: No bowel obstruction. Appendix isnormal. Colonic diverticulosis.  PERITONEUM: Mild presacral fluid.  VESSELS: Atherosclerotic changes.  RETROPERITONEUM/LYMPH NODES: No lymphadenopathy.  ABDOMINAL WALL: Within normal limits.  BONES: Degenerative changes. Sternotomy.    IMPRESSION:  No acute intra-abdominal pathology.    Small bilateral pleural effusions with compressive atelectasis of both lower lobes.    ====================================================    EXAM:  MR FOOT RT                            PROCEDURE DATE:  09/17/2021          INTERPRETATION:  Clinical Information: Recent fifth metatarsal head resection now with fifth digit pain, swelling and foul discharge.    Comparison: Radiographs of the right foot from 9/16/2021 and MRI the right foot from 8/16/2021.    Technique:  MRI of the right midfoot and forefoot.  Intravenous Contrast: None.    Findings:    There is a resection at the mid aspect of the fifth metatarsal shaft. There is a lateral soft tissue wound beginning at the level of the amputation which extends distally. There is susceptibility artifact in the region of the amputation consistent with postoperative change. There is hyperintense T2 marrow signal throughout the remaining fifth metatarsal and within the adjacent fourth metatarsal head which is nonspecific and could be related to recent postoperative changes although osteomyelitis is suspected.    There is edema and mild atrophy within the plantar muscles of the foot. There is minimal spurring at the first metatarsophalangeal and hallux sesamoid articulations.    Impression:  Resection at the mid aspect of the fifth metatarsal. Lateral soft tissue wound with marrow signal abnormality throughout the fifth metatarsal and within the adjacent fourth metatarsal head which is nonspecific in the setting of recent surgery although osteomyelitis is suspected.        ASSESSMENT :     Headache    HTN (hypertension)    HLD (hyperlipidemia)    DM (diabetes mellitus)    CAD (coronary artery disease)    Glaucoma, angle-closure    [place] (hard of hearing)    No significant past surgical history    S/P CABG (coronary artery bypass graft)    History of thyroid surgery        PLAN:  HPI:  76M from home, lives with daughter w/ PMH DM on insulin, HTN, HLD, CAD, 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 Pt with sharp pain on the R lateral foot. As per daughter, pt was taking tylenol which did not help his pain prompting the patient to ask his daughter to take him to the hospital. Pt is a poor historian. Daughter states that the patient did not see his podiatrist after the surgery. No fever, chest, pain, palpitations, nausea, vomiting, diarrhea (17 Sep 2021 01:11)    # PATIENT FOR D/C TO [facility] FOR STEVAN - D/W CM TEAM 10/2, AWAITING AUTHORIZATION / ANNETTA PER CM TEAM.    # SUSPECT RIGHT FOOT OSTEOMYELITIS S/P RIGHT 5TH RAY RESECTION [8/19] AND S/P DEBRIDEMENT, GRAFT APPLICATION, BONE BX AND WOUND VAC APPLICATION 9/22  ** RECENT ADMISSION FOR RIGHT DIABETIC FOOT ULCER, CELLULITIS, OSTEOMYELITIS RIGHT 5th METATARSAL S/P RIGHT 5TH PARTIAL RAY AMPUTATION [8/20] - BONE PATHOLOGY W/ CLEAN MARGINS    - S/P VANCOMYCIN AND ZOSYN ; NOW ON UNASYN AND LEVAQUIN GIVEN OREN; PER ID SWITCH TO ZOSYN UNTIL 11/3  - RECENTLY HAD SIMON W/ MILD PAD  - REVIEWED WOUND CX [ ENTEROCOCCUS, AEROMONAS, KLEBSIELLA] AND BCX  - BONE BX - acute osteomyelitis and necrotic periosteal tissue.   - ID CONSULT, PODIATRY CONSULT    - PICC LINE PLACED TODAY TO COMPLETE 6 WEEKS OF ANTIBIOTICS - PER ID SWITCH TO ZOSYN UNTIL 11/3    # ACUTE HYPOXIC RESPIRATORY FAILURE SUSPECT S/T PULMONARY EDEMA IN THE SETTING OF SEVERE AORTIC STENOSIS + ASPIRATION PNA - ON LEVAQUIN, STARTED LASIX, CARDIOLOGY CONSULT IN PROGRESS  - S/P CRITICAL CARE CONSULT  - WILL GIVE LASIX [9/30]`  - REPEAT CXR IN A.M. NOTED TO HAVE EFFUSION    # FUNGURIA - ON FLUCONAZOLE    # BOWEL DISTENTION - ? ILEUS - OPTIMIZING ELECTROLYTES - IMPROVED  - SURGERY CONSULT IN PROGRESS  - PASSED 2 BMS ON 9/27    # ASPIRATION EVENT WHEN PATIENT REMOVED NGT - ON LEVAQUIN, ID CONSULT IN PROGRESS    # CHOLELITHIASIS - TRENDING LFTS, RUQ U/S - CHOLELITHIASIS, SURGERY CONSULT IN PROGRESS  - GI CONSULT IN PROGRESS - LESS SUSPICIOUS FOR CHOLECYSTITIS    # ? DYSPEPSIA - PLACED ON PPI BID, IMPROVED, UNDERWENT ST EVAL - TOLERATING LIQUID DIET TODAY    # ELEVATED TROPONINS - NSTEMI - ? DEMAND - WILL NEED ISCHEMIC EVAL ONCE ACUTE INFECTION RESOLVES PER CARDIOLOGY, CARDIOLOGY CONSULT IN PROGRESS  - ON ASA, STATIN, BB    # OREN ON CKD - S/T ATN AND URINARY RETENTION - S/P IVF, NOW W/ CASTAÑEAD, NEPHROLOGY CONUSLT IN PROGRESS  - ON FLOMAX, ADDED FINASTERIDE    # MEDICAL CLEARANCE FOR SURGERY - RCRI - 2 - 10.1 % 30 DAY RISK OF DEATH, MI OR CARDIAC ARREST  - CARDIOLOGY CONSULT     # DM -  HBA1C - 11  - LANTUS, SSI + FS    # CAD S/P CABG - PLACED ON ASA, STATIN, BB  - ECHO - SEVERE AORTIC STENOSIS, SEVERE CONCENTRIC LVH, G1DD, MILD SC  - CARDIOLOGY CONSULT - DR. BASSETT   - MAY NEED ISCHEMIC EVAL ONCE ACUTE ILLNESS RESOLVES    # HTN - ON METOPROLOL, NICARDIPINE    # SEVERE, ASYMPTOMATIC, STENOSIS - ONCE ACUTE ILLNESS RESOLVES, WILL LIKELY NEED ISCHEMIC WORKUP, SABIHA TO EVALUATE FURTHER. D/W PATIENT, DAUGHTER AND CARDIOLOGY TEAM [PREVIOUSLY SAW DR. BASSETT]    # VITAMIN D DEFICIENCY - ON CHOLECALCIFEROL    # HLD - STATIN    # CASE DISCUSSED AT LENGTH WITH PATIENT AND DAUGHTER, BIRDIE ROBERT AT BEDSIDE AND VIA PHONE @ [phone] [10/1]  # PATIENT AND DAUGHTER REFUSED [facility] ON PREVIOUS ADMISSION, PREVIOUSLY WISHED FOR PATIENT RETURN HOME W/ HOME PT; HOWEVER NOW, DAUGHTER WISHES FOR PATIENT TO GO TO [facility] - [facility], AS PATIENT'S WIFE IS CURRENTLY ADMITTED THERE    # GI AND DVT PPX     Patient is a 76y old  Male who presents with a chief complaint of R Foot Pain (03 Oct 2021 10:08)    PATIENT IS SEEN AND EXAMINED IN MEDICAL FLOOR.      ALLERGIES:  No Known Allergies      VITALS:    Vital Signs Last 24 Hrs  T(C): 36.6 (03 Oct 2021 05:12), Max: 36.6 (03 Oct 2021 05:12)  T(F): 97.8 (03 Oct 2021 05:12), Max: 97.8 (03 Oct 2021 05:12)  HR: 76 (03 Oct 2021 05:12) (69 - 76)  BP: 131/62 (03 Oct 2021 05:12) (119/55 - 131/62)  BP(mean): --  RR: 17 (03 Oct 2021 05:12) (17 - 18)  SpO2: 92% (03 Oct 2021 05:12) (89% - 100%)    LABS:    CBC Full  -  ( 03 Oct 2021 05:58 )  WBC Count : 6.13 K/uL  RBC Count : 3.01 M/uL  Hemoglobin : 8.7 g/dL  Hematocrit : 27.1 %  Platelet Count - Automated : 301 K/uL  Mean Cell Volume : 90.0 fl  Mean Cell Hemoglobin : 28.9 pg  Mean Cell Hemoglobin Concentration : 32.1 gm/dL  Auto Neutrophil # : x  Auto Lymphocyte # : x  Auto Monocyte # : x  Auto Eosinophil # : x  Auto Basophil # : x  Auto Neutrophil % : x  Auto Lymphocyte % : x  Auto Monocyte % : x  Auto Eosinophil % : x  Auto Basophil % : x      10-03    143  |  105  |  22<H>  ----------------------------<  98  3.7   |  26  |  1.70<H>    Ca    8.6      03 Oct 2021 05:58      CAPILLARY BLOOD GLUCOSE      POCT Blood Glucose.: 107 mg/dL (03 Oct 2021 07:42)  POCT Blood Glucose.: 112 mg/dL (02 Oct 2021 21:54)  POCT Blood Glucose.: 110 mg/dL (02 Oct 2021 17:13)  POCT Blood Glucose.: 171 mg/dL (02 Oct 2021 11:30)          Creatinine Trend: 1.70<--, 1.49<--, 1.48<--, 1.66<--, 1.73<--, 2.24<--  I&O's Summary          .Tissue Right 5th toe r/o osteomyeltis  09-22 @ 13:54   No growth at 5 days  --    No polymorphonuclear cells seen per low power field  No organisms seen per oil power field      .Blood Blood-Venous  09-17 @ 10:28   No Growth Final  --  --      .Surgical Swab right foot wound  09-16 @ 21:42   Culture yields >4 types of aerobic and/or anaerobic bacteria  Call client services within 7 days if further workup is clinically  indicated. Culture includes  Rare Aeromonas hydrophila/caviae  Few Klebsiella oxytoca/Raoutella ornithinolytica  Few Enterococcus faecalis  Few Streptococcus agalactiae (Group B) isolated  Group B streptococci are susceptible to ampicillin,  penicillin and cefazolin, but may be resistant to  erythromycin and clindamycin.  Recommendations for intrapartum prophylaxis for Group B  streptococci are penicillin or ampicillin.  --  Aeromonas hydrophila/caviae  Klebsiella oxytoca /Raoutella ornithinolytica  Enterococcus faecalis      Bone R 5th metatarsal bone clean ma  08-20 @ 03:59   No growth at 5 days  --    No polymorphonuclear leukocytes seen per low power field  No organisms seen per oil power field      .Blood Blood-Venous  08-14 @ 21:09   No Growth Final  --  --      .Surgical Swab Right foot plantar wound  08-14 @ 21:08   Moderate Streptococcus agalactiae (Group B) isolated  Group B streptococci are susceptible to ampicillin,  penicillin and cefazolin, but may be resistant to  erythromycin and clindamycin.  Recommendations for intrapartum prophylaxis for Group B  streptococci are penicillin or ampicillin.  Moderate Bacteroides fragilis "Susceptibilities not performed"  Normal skin filiberto isolated  --  --          MEDICATIONS:    MEDICATIONS  (STANDING):  ampicillin/sulbactam  IVPB 3 Gram(s) IV Intermittent every 6 hours  aspirin  chewable 81 milliGRAM(s) Oral daily  atorvastatin 80 milliGRAM(s) Oral at bedtime  bisacodyl Suppository 10 milliGRAM(s) Rectal once  chlorhexidine 2% Cloths 1 Application(s) Topical <User Schedule>  cyanocobalamin 1000 MICROGram(s) Oral daily  ergocalciferol 72377 Unit(s) Oral <User Schedule>  ferrous    sulfate 325 milliGRAM(s) Oral daily  finasteride 5 milliGRAM(s) Oral daily  fluconAZOLE IVPB      fluconAZOLE IVPB 100 milliGRAM(s) IV Intermittent every 24 hours  heparin   Injectable 5000 Unit(s) SubCutaneous every 8 hours  influenza   Vaccine 0.5 milliLiter(s) IntraMuscular once  insulin lispro (ADMELOG) corrective regimen sliding scale   SubCutaneous Before meals and at bedtime  levoFLOXacin  Tablet 250 milliGRAM(s) Oral every 24 hours  metoprolol succinate  milliGRAM(s) Oral daily  NIFEdipine XL 60 milliGRAM(s) Oral daily  pantoprazole    Tablet 40 milliGRAM(s) Oral before breakfast  polyethylene glycol 3350 17 Gram(s) Oral daily  senna 2 Tablet(s) Oral at bedtime  tamsulosin 0.4 milliGRAM(s) Oral at bedtime      MEDICATIONS  (PRN):  ondansetron Injectable 4 milliGRAM(s) IV Push three times a day PRN Nausea and/or Vomiting  sodium chloride 0.9% lock flush 10 milliLiter(s) IV Push every 1 hour PRN Pre/post blood products, medications, blood draw, and to maintain line patency      REVIEW OF SYSTEMS:                           ALL ROS DONE [ X   ]    CONSTITUTIONAL:  LETHARGIC [   ], FEVER [   ], UNRESPONSIVE [   ]  CVS:  CP  [   ], SOB, [   ], PALPITATIONS [   ], DIZZYNESS [   ]  RS: COUGH [   ], SPUTUM [   ]  GI: ABDOMINAL PAIN [   ], NAUSEA [   ], VOMITINGS [   ], DIARRHEA [   ], CONSTIPATION [   ]  :  DYSURIA [   ], NOCTURIA [   ], INCREASED FREQUENCY [   ], DRIBLING [   ],  SKELETAL: PAINFUL JOINTS [   ], SWOLLEN JOINTS [   ], NECK ACHE [   ], LOW BACK ACHE [   ],  SKIN : ULCERS [   ], RASH [   ], ITCHING [   ]  CNS: HEAD ACHE [   ], DOUBLE VISION [   ], BLURRED VISION [   ], AMS / CONFUSION [   ], SEIZURES [   ], WEAKNESS [   ],TINGLING / NUMBNESS [   ]      PHYSICAL EXAMINATION:  GENERAL APPEARANCE: NO DISTRESS  HEENT:  NO PALLOR, NO  JVD,  NO   NODES, NECK SUPPLE  CVS: S1 +, S2 +,   RS: AEEB,  OCCASIONAL  RALES +,   NO RONCHI, CRACKLES +  ABD: SOFT, NT, NO, BS +     ;  DISTENDED +  EXT: NO PE  SKIN: WARM,   RIGHT FOOT WRAPPED W/ WOUND VAC IN PLACE  SKELETAL:  ROM ACCEPTABLE  CNS:  AAO X  2     RADIOLOGY :    EXAM:  CT ABDOMEN AND PELVIS OC                            PROCEDURE DATE:  09/27/2021          INTERPRETATION:  CLINICAL INFORMATION: Small bowel obstruction.    COMPARISON: None.    CONTRAST/COMPLICATIONS:  IV Contrast: NONE  Oral Contrast: Omnipaque 300  Complications: None reported at time of study completion    PROCEDURE:  CT of the Abdomen and Pelvis was performed.  Sagittal and coronal reformats were performed.    FINDINGS:  LOWER CHEST: Small bilateral pleural effusions with compressiveatelectasis of both lower lobes.    LIVER: Within normal limits.  BILE DUCTS: Normal caliber.  GALLBLADDER: Distended. Small layering gallstones.  SPLEEN: Within normal limits.  PANCREAS: Within normal limits.  ADRENALS: Within normal limits.  KIDNEYS/URETERS: No hydronephrosis. Nonobstructing left upper pole calculus, measuring 0.6 cm.    BLADDER: Urinary bladder contains air and a Castañeda catheter balloon.  REPRODUCTIVE ORGANS: Prostate is not enlarged.    BOWEL: No bowel obstruction. Appendix isnormal. Colonic diverticulosis.  PERITONEUM: Mild presacral fluid.  VESSELS: Atherosclerotic changes.  RETROPERITONEUM/LYMPH NODES: No lymphadenopathy.  ABDOMINAL WALL: Within normal limits.  BONES: Degenerative changes. Sternotomy.    IMPRESSION:  No acute intra-abdominal pathology.    Small bilateral pleural effusions with compressive atelectasis of both lower lobes.    ====================================================    EXAM:  MR FOOT RT                            PROCEDURE DATE:  09/17/2021          INTERPRETATION:  Clinical Information: Recent fifth metatarsal head resection now with fifth digit pain, swelling and foul discharge.    Comparison: Radiographs of the right foot from 9/16/2021 and MRI the right foot from 8/16/2021.    Technique:  MRI of the right midfoot and forefoot.  Intravenous Contrast: None.    Findings:    There is a resection at the mid aspect of the fifth metatarsal shaft. There is a lateral soft tissue wound beginning at the level of the amputation which extends distally. There is susceptibility artifact in the region of the amputation consistent with postoperative change. There is hyperintense T2 marrow signal throughout the remaining fifth metatarsal and within the adjacent fourth metatarsal head which is nonspecific and could be related to recent postoperative changes although osteomyelitis is suspected.    There is edema and mild atrophy within the plantar muscles of the foot. There is minimal spurring at the first metatarsophalangeal and hallux sesamoid articulations.    Impression:  Resection at the mid aspect of the fifth metatarsal. Lateral soft tissue wound with marrow signal abnormality throughout the fifth metatarsal and within the adjacent fourth metatarsal head which is nonspecific in the setting of recent surgery although osteomyelitis is suspected.        ASSESSMENT :     Headache    HTN (hypertension)    HLD (hyperlipidemia)    DM (diabetes mellitus)    CAD (coronary artery disease)    Glaucoma, angle-closure    [place] (hard of hearing)    No significant past surgical history    S/P CABG (coronary artery bypass graft)    History of thyroid surgery        PLAN:  HPI:  76M from home, lives with daughter w/ PMH DM on insulin, HTN, HLD, CAD, 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 Pt with sharp pain on the R lateral foot. As per daughter, pt was taking tylenol which did not help his pain prompting the patient to ask his daughter to take him to the hospital. Pt is a poor historian. Daughter states that the patient did not see his podiatrist after the surgery. No fever, chest, pain, palpitations, nausea, vomiting, diarrhea (17 Sep 2021 01:11)    # PATIENT FOR D/C TO [facility] FOR STEVAN - D/W CM TEAM 10/2, AWAITING AUTHORIZATION / ANNETTA PER CM TEAM.    # SUSPECT RIGHT FOOT OSTEOMYELITIS S/P RIGHT 5TH RAY RESECTION [8/19] AND S/P DEBRIDEMENT, GRAFT APPLICATION, BONE BX AND WOUND VAC APPLICATION 9/22  ** RECENT ADMISSION FOR RIGHT DIABETIC FOOT ULCER, CELLULITIS, OSTEOMYELITIS RIGHT 5th METATARSAL S/P RIGHT 5TH PARTIAL RAY AMPUTATION [8/20] - BONE PATHOLOGY W/ CLEAN MARGINS    - S/P VANCOMYCIN AND ZOSYN ; NOW ON UNASYN AND LEVAQUIN GIVEN OREN; PER ID SWITCH TO ZOSYN UNTIL 11/3  - RECENTLY HAD SIMON W/ MILD PAD  - REVIEWED WOUND CX [ ENTEROCOCCUS, AEROMONAS, KLEBSIELLA] AND BCX  - BONE BX - acute osteomyelitis and necrotic periosteal tissue.   - ID CONSULT, PODIATRY CONSULT    - PICC LINE PLACED TODAY TO COMPLETE 6 WEEKS OF ANTIBIOTICS - PER ID SWITCH TO ZOSYN UNTIL 11/3    # ACUTE HYPOXIC RESPIRATORY FAILURE SUSPECT S/T PULMONARY EDEMA IN THE SETTING OF SEVERE AORTIC STENOSIS + ASPIRATION PNA - ON LEVAQUIN, STARTED LASIX, CARDIOLOGY CONSULT IN PROGRESS  - S/P CRITICAL CARE CONSULT  - S/P LASIX  - REPEAT CXR IN A.M. NOTED TO HAVE EFFUSION    # FUNGURIA - ON FLUCONAZOLE    # BOWEL DISTENTION - ? ILEUS - OPTIMIZING ELECTROLYTES - IMPROVED  - SURGERY CONSULT IN PROGRESS  - PASSED 2 BMS ON 9/27    # ASPIRATION EVENT WHEN PATIENT REMOVED NGT - ON LEVAQUIN, ID CONSULT IN PROGRESS    # CHOLELITHIASIS - TRENDING LFTS, RUQ U/S - CHOLELITHIASIS, SURGERY CONSULT IN PROGRESS  - GI CONSULT IN PROGRESS - LESS SUSPICIOUS FOR CHOLECYSTITIS    # ? DYSPEPSIA - PLACED ON PPI BID, IMPROVED, UNDERWENT ST EVAL - TOLERATING DIET     # ELEVATED TROPONINS - NSTEMI - ? DEMAND - WILL NEED ISCHEMIC EVAL ONCE ACUTE INFECTION RESOLVES PER CARDIOLOGY, CARDIOLOGY CONSULT IN PROGRESS  - ON ASA, STATIN, BB    # OREN ON CKD - S/T ATN AND URINARY RETENTION - S/P IVF, NOW W/ CASTAÑEDA, NEPHROLOGY CONUSLT IN PROGRESS  - ON FLOMAX, ADDED FINASTERIDE    # MEDICAL CLEARANCE FOR SURGERY - RCRI - 2 - 10.1 % 30 DAY RISK OF DEATH, MI OR CARDIAC ARREST  - CARDIOLOGY CONSULT     # DM -  HBA1C - 11  - LANTUS, SSI + FS    # CAD S/P CABG - PLACED ON ASA, STATIN, BB  - ECHO - SEVERE AORTIC STENOSIS, SEVERE CONCENTRIC LVH, G1DD, MILD AK  - CARDIOLOGY CONSULT - DR. BASSETT   - MAY NEED ISCHEMIC EVAL ONCE ACUTE ILLNESS RESOLVES    # HTN - ON METOPROLOL, NICARDIPINE    # SEVERE, ASYMPTOMATIC, STENOSIS - ONCE ACUTE ILLNESS RESOLVES, WILL LIKELY NEED ISCHEMIC WORKUP, SABIHA TO EVALUATE FURTHER. D/W PATIENT, DAUGHTER AND CARDIOLOGY TEAM [PREVIOUSLY SAW DR. BASSETT]    # VITAMIN D DEFICIENCY - ON CHOLECALCIFEROL    # HLD - STATIN    # CASE DISCUSSED AT LENGTH WITH PATIENT AND DAUGHTER, BIRDIE ROBERT AT BEDSIDE AND VIA PHONE @ [phone] [10/1]  # PATIENT AND DAUGHTER REFUSED [facility] ON PREVIOUS ADMISSION, PREVIOUSLY WISHED FOR PATIENT RETURN HOME W/ HOME PT; HOWEVER NOW, DAUGHTER WISHES FOR PATIENT TO GO TO [facility], AS PATIENT'S WIFE IS CURRENTLY ADMITTED THERE    # GI AND DVT PPX

## 2021-10-03 NOTE — PROGRESS NOTE ADULT - ASSESSMENT
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 s/p OR debridement today. Nephrology consulted for abrupt rise in SCr.     1. OREN- likely ATN in the setting of infection and ACEi use. Lisinopril discontinued 9/22. Again pt with coarse BS, crackles, CXR 10/2 c/w pulm edema.  Pt was given Furosemide on10/1 and larger dose yesterday 10/2.  Renal fxn worsened slightly with furosemide. ATN was resolving, now slightly worse likely hemodynamics.  If respiratory symptoms don't improve, would recommend CT chest (non-contrast) to evaluate further and confirm pulmonary edema.  Pt with coarse BS, crackles, CXR c/w pulm edema.  Pt was given Furosemide on10/1 and larger dose yesterday 10/2.    s/p Lasix 80mg IV on 9/27 for pulmonary edema/ SOB. Renal function improving with good urine o/p s/p Lasix 40mg IV on 9/30. Mild hypokalemia- will give KCl 40meq PO x1. Monitor lytes.   Kidney/ Bladder US with large distended bladder s/p ibrahim placement on 9/23; renal function did not improve post ibrahim; less likely due to obstructive uropathy. UA with 30 protein, trace blood with small LE  FeNa 0.72% and FeUrea 19.39%; consistent with pre-renal OREN. However renal function worsening on IVF. No peripheral eosinophilia- no signs of AIN. C3 & C4 wnl with neg ASO- no signs of PIGN. Strict I/Os. Avoid nephrotoxins/ NSAIDs/ RCA. Monitor BMP.      2. 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 Will defer secondary w/u as an outpt. Avoid nephrotoxins  3. HTN 2/2 CKD- BP acceptable. Lisinopril d/c due to OREN. c/w Nifedipine ER 60mg PO qd. Monitor BP  4. Acute osteomyelitis- s/p debridement 9/22. Pt on Unasyn & Levaquin. Plan as per [facility] NEPHROLOGY  Bryn Johnson M.D.  Domingo Booth D.O.  Zakia Rodriguez M.D.  Zonia Adams, MSN, ANP-C  [phone]    [address] [id number]

## 2021-10-03 NOTE — PROGRESS NOTE ADULT - SUBJECTIVE AND OBJECTIVE BOX
[facility] NEPHROLOGY- PROGRESS NOTE    Patient is a 77yo Male with DM on insulin, HTN, HLD, CAD, s/p R partial 5th ray amputation 8/19/2021 p/w R foot pain and foul drainage a/w diabetic foot ulcer suspicious for osteomyelitis. s/p OR debridement today. Nephrology consulted for abrupt rise in SCr.   s/p ibrahim placement for distended bladder on 9/23 with ~400ml of urine o/p  9/27- pt with SOB; CXR with pulmonary edema- pt given Lasix 80mg IV x1. Concern for aspiration PNA    Pt with coarse BS, crackles, CXR c/w pulm edema.  Pt was given Furosemide on10/1 and larger dose yesterday 10/2.    Hospital Medications: Medications reviewed.  REVIEW OF SYSTEMS:  CONSTITUTIONAL: No fevers or chills  RESPIRATORY: no shortness of breath  CARDIOVASCULAR: No chest pain.  GASTROINTESTINAL: No nausea or vomiting, ordiarrhea or abdominal pain    VASCULAR: No bilateral lower extremity edema. Rt foot - denies pain    VITALS:  T(F): 97.8 (10-03-21 @ 05:12), Max: 97.8 (10-03-21 @ 05:12)  HR: 76 (10-03-21 @ 05:12)  BP: 131/62 (10-03-21 @ 05:12)  RR: 17 (10-03-21 @ 05:12)  SpO2: 92% (10-03-21 @ 05:12)  Wt(kg): --      PHYSICAL EXAM:  Gen: NAD, calm  HEENT: MMM  Neck: no JVD  Cards: RRR, +S1/S2, +TRINIDAD  Resp: bilateral crackles, coarse BS  GI: soft, NT  Extremities: no LE edema B/L  : +ibrahim  Derm: Rt foot wrapped/ wound vac    LABS:  10-03    143  |  105  |  22<H>  ----------------------------<  98  3.7   |  26  |  1.70<H>    Ca    8.6      03 Oct 2021 05:58      Creatinine Trend: 1.70 <--, 1.49 <--, 1.48 <--, 1.66 <--, 1.73 <--, 2.24 <--, 3.19 <--                        8.7    6.13  )-----------( 301      ( 03 Oct 2021 05:58 )             27.1

## 2021-10-04 DIAGNOSIS — Z71.89 OTHER SPECIFIED COUNSELING: ICD-10-CM

## 2021-10-04 LAB
ANION GAP SERPL CALC-SCNC: 13 MMOL/L — SIGNIFICANT CHANGE UP (ref 5–17)
BUN SERPL-MCNC: 27 MG/DL — HIGH (ref 7–18)
CALCIUM SERPL-MCNC: 8.4 MG/DL — SIGNIFICANT CHANGE UP (ref 8.4–10.5)
CHLORIDE SERPL-SCNC: 103 MMOL/L — SIGNIFICANT CHANGE UP (ref 96–108)
CO2 SERPL-SCNC: 25 MMOL/L — SIGNIFICANT CHANGE UP (ref 22–31)
CREAT SERPL-MCNC: 2.13 MG/DL — HIGH (ref 0.5–1.3)
GLUCOSE BLDC GLUCOMTR-MCNC: 106 MG/DL — HIGH (ref 70–99)
GLUCOSE BLDC GLUCOMTR-MCNC: 116 MG/DL — HIGH (ref 70–99)
GLUCOSE BLDC GLUCOMTR-MCNC: 119 MG/DL — HIGH (ref 70–99)
GLUCOSE BLDC GLUCOMTR-MCNC: 129 MG/DL — HIGH (ref 70–99)
GLUCOSE SERPL-MCNC: 91 MG/DL — SIGNIFICANT CHANGE UP (ref 70–99)
HCT VFR BLD CALC: 27.4 % — LOW (ref 39–50)
HGB BLD-MCNC: 8.9 G/DL — LOW (ref 13–17)
MCHC RBC-ENTMCNC: 29.3 PG — SIGNIFICANT CHANGE UP (ref 27–34)
MCHC RBC-ENTMCNC: 32.5 GM/DL — SIGNIFICANT CHANGE UP (ref 32–36)
MCV RBC AUTO: 90.1 FL — SIGNIFICANT CHANGE UP (ref 80–100)
NRBC # BLD: 0 /100 WBCS — SIGNIFICANT CHANGE UP (ref 0–0)
PLATELET # BLD AUTO: 291 K/UL — SIGNIFICANT CHANGE UP (ref 150–400)
POTASSIUM SERPL-MCNC: 3.5 MMOL/L — SIGNIFICANT CHANGE UP (ref 3.5–5.3)
POTASSIUM SERPL-SCNC: 3.5 MMOL/L — SIGNIFICANT CHANGE UP (ref 3.5–5.3)
RBC # BLD: 3.04 M/UL — LOW (ref 4.2–5.8)
RBC # FLD: 14.1 % — SIGNIFICANT CHANGE UP (ref 10.3–14.5)
SODIUM SERPL-SCNC: 141 MMOL/L — SIGNIFICANT CHANGE UP (ref 135–145)
WBC # BLD: 5.87 K/UL — SIGNIFICANT CHANGE UP (ref 3.8–10.5)
WBC # FLD AUTO: 5.87 K/UL — SIGNIFICANT CHANGE UP (ref 3.8–10.5)

## 2021-10-04 PROCEDURE — 71045 X-RAY EXAM CHEST 1 VIEW: CPT | Mod: 26

## 2021-10-04 RX ORDER — FUROSEMIDE 40 MG
40 TABLET ORAL ONCE
Refills: 0 | Status: COMPLETED | OUTPATIENT
Start: 2021-10-04 | End: 2021-10-04

## 2021-10-04 RX ADMIN — Medication 81 MILLIGRAM(S): at 12:36

## 2021-10-04 RX ADMIN — CHLORHEXIDINE GLUCONATE 1 APPLICATION(S): 213 SOLUTION TOPICAL at 06:24

## 2021-10-04 RX ADMIN — SENNA PLUS 2 TABLET(S): 8.6 TABLET ORAL at 21:41

## 2021-10-04 RX ADMIN — AMPICILLIN SODIUM AND SULBACTAM SODIUM 200 GRAM(S): 250; 125 INJECTION, POWDER, FOR SUSPENSION INTRAMUSCULAR; INTRAVENOUS at 17:53

## 2021-10-04 RX ADMIN — HEPARIN SODIUM 5000 UNIT(S): 5000 INJECTION INTRAVENOUS; SUBCUTANEOUS at 14:39

## 2021-10-04 RX ADMIN — PREGABALIN 1000 MICROGRAM(S): 225 CAPSULE ORAL at 12:36

## 2021-10-04 RX ADMIN — ATORVASTATIN CALCIUM 80 MILLIGRAM(S): 80 TABLET, FILM COATED ORAL at 21:41

## 2021-10-04 RX ADMIN — HEPARIN SODIUM 5000 UNIT(S): 5000 INJECTION INTRAVENOUS; SUBCUTANEOUS at 06:22

## 2021-10-04 RX ADMIN — PANTOPRAZOLE SODIUM 40 MILLIGRAM(S): 20 TABLET, DELAYED RELEASE ORAL at 06:23

## 2021-10-04 RX ADMIN — Medication 60 MILLIGRAM(S): at 06:23

## 2021-10-04 RX ADMIN — TAMSULOSIN HYDROCHLORIDE 0.4 MILLIGRAM(S): 0.4 CAPSULE ORAL at 21:40

## 2021-10-04 RX ADMIN — POLYETHYLENE GLYCOL 3350 17 GRAM(S): 17 POWDER, FOR SOLUTION ORAL at 12:36

## 2021-10-04 RX ADMIN — Medication 40 MILLIGRAM(S): at 10:59

## 2021-10-04 RX ADMIN — FINASTERIDE 5 MILLIGRAM(S): 5 TABLET, FILM COATED ORAL at 12:36

## 2021-10-04 RX ADMIN — AMPICILLIN SODIUM AND SULBACTAM SODIUM 200 GRAM(S): 250; 125 INJECTION, POWDER, FOR SUSPENSION INTRAMUSCULAR; INTRAVENOUS at 06:22

## 2021-10-04 RX ADMIN — Medication 325 MILLIGRAM(S): at 12:36

## 2021-10-04 RX ADMIN — HEPARIN SODIUM 5000 UNIT(S): 5000 INJECTION INTRAVENOUS; SUBCUTANEOUS at 21:41

## 2021-10-04 RX ADMIN — AMPICILLIN SODIUM AND SULBACTAM SODIUM 200 GRAM(S): 250; 125 INJECTION, POWDER, FOR SUSPENSION INTRAMUSCULAR; INTRAVENOUS at 11:28

## 2021-10-04 NOTE — PROGRESS NOTE ADULT - SUBJECTIVE AND OBJECTIVE BOX
Podiatry Interval HPI: Pt seen bedside resting comfortably. Patient states that he had been keeping the dressing clean and intact. Endorses to mild pain on the right foot. Pt denies any overnight acute events.  Denies constitutional symptoms No other pedal complaints.    Podiatry HPI: 76 year old male with a PMHx of DM, HTN, HLD, CAD to ED presents to the ED for a Right Foot s/p 5th foot partial ray resection and fillet toe flap. Patient states that he has sharp localized non-radiating pain to the Right foot 5th metatarsal. States that after his surgery, he did not see a podiatrist and he came into the ED because he saw drainage and was having pain. Denies any constitutional symptoms of N/V/C/F/SOB. Denies any other pedal complaints at this time. Denies calf pain today, bilaterally.      Medications ampicillin/sulbactam  IVPB 3 Gram(s) IV Intermittent every 6 hours  aspirin  chewable 81 milliGRAM(s) Oral daily  atorvastatin 80 milliGRAM(s) Oral at bedtime  bisacodyl Suppository 10 milliGRAM(s) Rectal once  chlorhexidine 2% Cloths 1 Application(s) Topical <User Schedule>  cyanocobalamin 1000 MICROGram(s) Oral daily  ergocalciferol 66156 Unit(s) Oral <User Schedule>  ferrous    sulfate 325 milliGRAM(s) Oral daily  finasteride 5 milliGRAM(s) Oral daily  fluconAZOLE IVPB      fluconAZOLE IVPB 100 milliGRAM(s) IV Intermittent every 24 hours  heparin   Injectable 5000 Unit(s) SubCutaneous every 8 hours  influenza   Vaccine 0.5 milliLiter(s) IntraMuscular once  insulin lispro (ADMELOG) corrective regimen sliding scale   SubCutaneous Before meals and at bedtime  levoFLOXacin  Tablet 250 milliGRAM(s) Oral every 24 hours  metoprolol succinate  milliGRAM(s) Oral daily  NIFEdipine XL 60 milliGRAM(s) Oral daily  ondansetron Injectable 4 milliGRAM(s) IV Push three times a day PRN  pantoprazole    Tablet 40 milliGRAM(s) Oral before breakfast  polyethylene glycol 3350 17 Gram(s) Oral daily  senna 2 Tablet(s) Oral at bedtime  sodium chloride 0.9% lock flush 10 milliLiter(s) IV Push every 1 hour PRN  tamsulosin 0.4 milliGRAM(s) Oral at bedtime    FH: Family history of acute myocardial infarction (Father)    Family history of diabetes mellitus (Mother, Sibling)    Family history of hypertension (Mother, Sibling)    Family history of hyperlipidemia (Mother, Sibling)    ,   PMH: HTN (hypertension)    HLD (hyperlipidemia)    DM (diabetes mellitus)    CAD (coronary artery disease)    Glaucoma, angle-closure    [place] (hard of hearing)       PSH: No significant past surgical history    S/P CABG (coronary artery bypass graft)    History of thyroid surgery      Labs                        8.9    5.87  )-----------( 291      ( 04 Oct 2021 06:10 )             27.4      10-04    141  |  103  |  27<H>  ----------------------------<  91  3.5   |  25  |  2.13<H>    Ca    8.4      04 Oct 2021 06:10       Vital Signs Last 24 Hrs  T(C): 36.4 (04 Oct 2021 14:00), Max: 36.7 (03 Oct 2021 21:23)  T(F): 97.6 (04 Oct 2021 14:00), Max: 98.1 (03 Oct 2021 21:23)  HR: 71 (04 Oct 2021 14:00) (71 - 76)  BP: 112/50 (04 Oct 2021 14:00) (112/50 - 121/68)  BP(mean): 66 (04 Oct 2021 09:30) (66 - 81)  RR: 20 (04 Oct 2021 14:00) (16 - 20)  SpO2: 90% (04 Oct 2021 14:00) (90% - 92%)  Sedimentation Rate, Erythrocyte: 108 mm/Hr (10-02-21 @ 07:02)  Sedimentation Rate, Erythrocyte: 77 mm/Hr (09-16-21 @ 16:03)         C-Reactive Protein, Serum: 43 mg/L (10-02-21 @ 14:05)  C-Reactive Protein, Serum: 45 mg/L (09-16-21 @ 23:33)  C-Reactive Protein, Serum: 85 mg/L (08-22-21 @ 21:30)  C-Reactive Protein, Serum: 67 mg/L (08-15-21 @ 11:55)   WBC Count: 5.87 K/uL (10-04-21 @ 06:10)      CAPILLARY BLOOD GLUCOSE    POCT Blood Glucose.: 116 mg/dL (04 Oct 2021 12:12)  POCT Blood Glucose.: 106 mg/dL (04 Oct 2021 07:50)  POCT Blood Glucose.: 125 mg/dL (03 Oct 2021 21:33)  POCT Blood Glucose.: 112 mg/dL (03 Oct 2021 16:26)    ROS: All others negative unless otherwise stated in the HPI      PHYSICAL EXAM Kept bandage intact   LE Focused:    Vasc:  DP/PT pulses were faintly palpable, bilateral. DP/PT pulses were monophasic on doppler, bilaterally. CFT<3 seconds to all digits.   Derm: Surgical site with graft in place to R 5th ray, granular wound bed through the graft, minimal drainage or discharge. minimal erythema and edema noted, eschar forming over the wound. Dorsal foot wound noted- mild erythema and edema noted. DTI noted to b/l heel  Neuro: Protective sensation grossly diminished, b/l  MSK: 5/5 muscle strength noted to the pedal compartments. 5th digit amputation R foot      Imaging:    3 views right foot. Prior 8/20/2021.    Status post resection of the fifth toe and distal fifth metatarsal unchanged in appearance. No focal bone lysis or unusual periosteal reaction to suggest osteomyelitis. No acute fracture or dislocation. The remainder study unchanged. No soft tissue gas.    IMPRESSION: No acute finding. No plain film evidence of osteomyelitis.      MRI R foot:     INTERPRETATION:  Clinical Information: Recent fifth metatarsal head resection now with fifth digit pain, swelling and foul discharge.    Comparison: Radiographs of the right foot from 9/16/2021 and MRI the right foot from 8/16/2021.    Technique:  MRI of the right midfoot and forefoot.  Intravenous Contrast: None.    Findings:    There is a resection at the mid aspect of the fifth metatarsal shaft. There is a lateral soft tissue wound beginning at the level of the amputation which extends distally. There is susceptibility artifact in the region of the amputation consistent with postoperative change. There is hyperintense T2 marrow signal throughout the remaining fifth metatarsal and within the adjacent fourth metatarsal head which is nonspecific and could be related to recent postoperative changes although osteomyelitis is suspected.    There is edema and mild atrophy within the plantar muscles of the foot. There is minimal spurring at the first metatarsophalangeal and hallux sesamoid articulations.    Impression:  Resection at the mid aspect of the fifth metatarsal. Lateral soft tissue wound with marrow signal abnormality throughout the fifth metatarsal and within the adjacent fourth metatarsal head which is nonspecific in the setting of recent surgery although osteomyelitis is suspected.        Culture Results:     Rare Aeromonas hydrophila/caviae   Few Klebsiella oxytoca/Raoutella ornithinolytica   Few Enterococcus faecalis   Few Streptococcus agalactiae (Group B) isolated   Group B streptococci are susceptible to ampicillin,   penicillin and cefazolin, but may be resistant to   erythromycin and clindamycin.   Recommendations for intrapartum prophylaxis for Group B   streptococci are penicillin or ampicillin. (09.16.21 @ 21:42)     Gram Stain:   No polymorphonuclear cells seen per low power field   No organisms seen per oil power field (09.22.21 @ 13:54)       Historical Values  Gram Stain:   No polymorphonuclear cells seen per low power field   No organisms seen per oil power field (09.22.21 @ 13:54)   Gram Stain:   No polymorphonuclear leukocytes seen per low power field   No organisms seen per oil power field (08.20.21 @ 03:59)

## 2021-10-04 NOTE — PROGRESS NOTE ADULT - SUBJECTIVE AND OBJECTIVE BOX
Patient is seen and examined at the bed side, is afebrile.  The menta status has improved. The creatinine is trending back up.       REVIEW OF SYSTEMS: All other review systems are negative      ALLERGIES: No Known Allergies      Vital Signs Last 24 Hrs  T(C): 36.7 (04 Oct 2021 05:03), Max: 36.7 (03 Oct 2021 21:23)  T(F): 98 (04 Oct 2021 05:03), Max: 98.1 (03 Oct 2021 21:23)  HR: 71 (04 Oct 2021 09:30) (71 - 76)  BP: 116/48 (04 Oct 2021 09:30) (110/56 - 121/68)  BP(mean): 66 (04 Oct 2021 09:30) (66 - 81)  RR: 18 (04 Oct 2021 09:30) (16 - 18)  SpO2: 92% (04 Oct 2021 09:30) (90% - 95%)      PHYSICAL EXAM:  GENERAL: Not in distress, on oxygen via NC  CHEST/LUNG:  Not using accessory muscles  HEART: s1 and s2 present  ABDOMEN:  Mild distended   EXTREMITIES: Right foot bandage in placed   CNS: Awake, Alert  and oriented        LABS:                        8.9    5.87  )-----------( 291      ( 04 Oct 2021 06:10 )             27.4                           9.1    6.17  )-----------( 332      ( 01 Oct 2021 06:35 )             28.3         10-04    141  |  103  |  27<H>  ----------------------------<  91  3.5   |  25  |  2.13<H>    Ca    8.4      04 Oct 2021 06:10      10-01    145  |  107  |  23<H>  ----------------------------<  109<H>  3.4<L>   |  27  |  1.48<H>    Ca    8.4      01 Oct 2021 06:35  Phos  2.9     10-01  Mg     2.2     10-01      TPro  7.1  /  Alb  2.3<L>  /  TBili  0.5  /  DBili  x   /  AST  20  /  ALT  15  /  AlkPhos  101  09-28    09-28    144  |  109<H>  |  39<H>  ----------------------------<  124<H>  3.7   |  27  |  2.24<H>    Ca    8.7      28 Sep 2021 07:39  Phos  3.0     09-28  Mg     2.3     09-28    TPro  7.1  /  Alb  2.3<L>  /  TBili  0.5  /  DBili  x   /  AST  20  /  ALT  15  /  AlkPhos  101  09-28 09-25    139  |  107  |  41<H>  ----------------------------<  134<H>  4.1   |  21<L>  |  4.05<H>    Ca    8.6      25 Sep 2021 06:40    TPro  6.6  /  Alb  2.3<L>  /  TBili  0.5  /  DBili  x   /  AST  25  /  ALT  15  /  AlkPhos  102  09-25        CAPILLARY BLOOD GLUCOSE  POCT Blood Glucose.: 134 mg/dL (17 Sep 2021 17:11)  POCT Blood Glucose.: 128 mg/dL (17 Sep 2021 11:06)  POCT Blood Glucose.: 157 mg/dL (17 Sep 2021 04:10)      MEDICATIONS  (STANDING):    ampicillin/sulbactam  IVPB 3 Gram(s) IV Intermittent every 6 hours  aspirin  chewable 81 milliGRAM(s) Oral daily  atorvastatin 80 milliGRAM(s) Oral at bedtime  bisacodyl Suppository 10 milliGRAM(s) Rectal once  chlorhexidine 2% Cloths 1 Application(s) Topical <User Schedule>  cyanocobalamin 1000 MICROGram(s) Oral daily  ergocalciferol 62075 Unit(s) Oral <User Schedule>  ferrous    sulfate 325 milliGRAM(s) Oral daily  finasteride 5 milliGRAM(s) Oral daily  fluconAZOLE IVPB      fluconAZOLE IVPB 100 milliGRAM(s) IV Intermittent every 24 hours  heparin   Injectable 5000 Unit(s) SubCutaneous every 8 hours  influenza   Vaccine 0.5 milliLiter(s) IntraMuscular once  insulin lispro (ADMELOG) corrective regimen sliding scale   SubCutaneous Before meals and at bedtime  levoFLOXacin  Tablet 250 milliGRAM(s) Oral every 24 hours  metoprolol succinate  milliGRAM(s) Oral daily  NIFEdipine XL 60 milliGRAM(s) Oral daily  pantoprazole    Tablet 40 milliGRAM(s) Oral before breakfast  polyethylene glycol 3350 17 Gram(s) Oral daily  senna 2 Tablet(s) Oral at bedtime  tamsulosin 0.4 milliGRAM(s) Oral at bedtime        RADIOLOGY & ADDITIONAL TESTS:    10/2/21: Xray Chest 1 View- PORTABLE-Routine (Xray Chest 1 View- PORTABLE-Routine in AM.) (10.02.21 @ 09:46) There is persistent bilateral perihilar/basilar diffuse airspace disease and/or RIGHT effusion.  Cardiomegaly.  No interval change.        Collected Date/Time: 9/22/2021 08:42 EDT   Received Date/Time: 9/23/2021 09:29 EDT   Surgical Pathology Report - Auth (Verified)   Specimen(s) Submitted   1 Right 5th Toe Wound Debridement r/o Osteomyelitis   Final Diagnosis   Right fifth toe; wound debridement:   - Bone with acute osteomyelitis and necrotic periosteal tissue.     9/28/21: US Abdomen Upper Quadrant Right (09.28.21 @ 12:13) Cholelithiasis without evidence of acute cholecystitis. Note is made of right pleural effusion    9/27/21: CT Abdomen and Pelvis w/ Oral Cont (09.27.21 @ 15:53) >No acute intra-abdominal pathology.    Small bilateral pleural effusions with compressive atelectasis of both lower lobes.    9/26/21: Xray Chest 1 View-PORTABLE IMMEDIATE (Xray Chest 1 View-PORTABLE IMMEDIATE .) (09.26.21 @ 15:28) : Small bilateral pleural effusions and mild pulmonary edema. A right lower lobe pneumonia is not excluded.      9/26/21: Xray Abdomen 1 View Portable, IMMEDIATE (Xray Abdomen 1 View Portable, IMMEDIATE .) (09.26.21 @ 15:28) : There is a nasogastric tube with its tip in the stomach. There is gaseous distention of the colon. No pathologic calcifications are seen. The osseous structures are intact with degenerative change is present in the spine.      9/17/21 : MR Foot No Cont, Right (09.17.21 @ 13:04) : Resection at the mid aspect of the fifth metatarsal. Lateral soft tissue wound with marrow signal abnormality throughout the fifth metatarsal and within the adjacent fourth metatarsal head which is nonspecific in the setting of recent surgery although osteomyelitis is suspected.    9/16/21 : Xray Foot AP + Lateral + Oblique, Right (09.16.21 @ 15:03) : No acute finding. No plain film evidence of osteomyelitis.        MICROBIOLOGY DATA:    Urine Microscopic-Add On (NC) (09.22.21 @ 16:14)   Red Blood Cell - Urine: 0-2 /HPF   White Blood Cell - Urine: 3-5 /HPF   Bacteria: Moderate /HPF   Comment - Urine: yeast cells present   Epithelial Cells: Moderate /HPF     Culture - Tissue with Gram Stain (09.22.21 @ 13:54)   Gram Stain: No polymorphonuclear cells seen per low power field   No organisms seen per oil power field   Specimen Source: .Tissue Right 5th toe r/o osteomyeltis   Culture Results: No growth     COVID-19 David Domain Antibody (09.18.21 @ 12:55)   COVID-19 David Domain Antibody Result: >250.00:    Culture - Blood (09.17.21 @ 10:28)   Specimen Source: .Blood Blood-Peripheral   Culture Results: No growth to date.     Culture - Blood (09.17.21 @ 10:28)   Specimen Source: .Blood Blood-Venous   Culture Results: No growth to date.     Culture - Surgical Swab (09.16.21 @ 21:42)   - Amikacin: S <=16   - Amikacin: S <=16   - Amoxicillin/Clavulanic Acid: S <=8/4   - Ampicillin: R >16 These ampicillin results predict results for amoxicillin   - Ampicillin: S <=2 Predicts results to ampicillin/sulbactam, amoxacillin-clavulanate and piperacillin-tazobactam.   - Ampicillin/Sulbactam: S 8/4 Enterobacter, Citrobacter, and Serratia may develop resistance during prolonged therapy (3-4 days)   - Ampicillin/Sulbactam: R >16/8   - Aztreonam: S <=4   - Aztreonam: S <=4   - Cefazolin: R 16 Enterobacter, Citrobacter, and Serratia may develop resistance during prolonged therapy (3-4 days)   - Cefazolin: R >16   - Cefepime: S <=2   - Cefepime: S <=2   - Cefoxitin: S <=8   - Cefoxitin: S <=8   - Ceftazidime: S <=1   - Ceftriaxone: S <=1   - Ceftriaxone: S <=1 Enterobacter, Citrobacter, and Serratia may develop resistance during prolonged therapy   - Ciprofloxacin: S <=0.25   - Ciprofloxacin: S <=0.25   - Ertapenem: S <=0.5   - Gentamicin: S <=2   - Gentamicin: S <=2   - Imipenem: S <=1   - Levofloxacin: S <=0.5   - Levofloxacin: S <=0.5   - Meropenem: S <=1   - Meropenem: S <=1   - Piperacillin/Tazobactam: S <=8   - Piperacillin/Tazobactam: S <=8   - Tetra/Doxy: S 4   - Tobramycin: S <=2   - Trimethoprim/Sulfamethoxazole: S <=0.5/9.5   - Trimethoprim/Sulfamethoxazole: S <=0.5/9.5   - Vancomycin: S 2   Specimen Source: .Surgical Swab right foot wound   Culture Results:   Culture yields >4 types of aerobic and/or anaerobic bacteria   Call client services within 7 days if further workup is clinically   indicated. Culture includes   Rare Aeromonas hydrophila/caviae   Few Klebsiella oxytoca/Raoutella ornithinolytica   Few Enterococcus faecalis   Few Streptococcus agalactiae (Group B) isolated   Group B streptococci are susceptible to ampicillin,   penicillin and cefazolin, but may be resistant to   erythromycin and clindamycin.   Recommendations for intrapartum prophylaxis for Group B   streptococci are penicillin or ampicillin.   Organism Identification: Aeromonas hydrophila/caviae   Klebsiella oxytoca /Raoutella ornithinolytica   Enterococcus faecalis   Organism: Aeromonas hydrophila/caviae   Organism: Klebsiella oxytoca /Raoutella ornithinolytica   Organism: Enterococcus faecalis   COVID-19 PCR . (09.16.21 @ 22:24)   COVID-19 PCR: NotDetec:

## 2021-10-04 NOTE — PROGRESS NOTE ADULT - PROBLEM SELECTOR PLAN 2
Secondary to severe AS  Cardiology recommending SABIHA and R+L heart cath/ TAVR once he is medically optimized and infection treated  Cardiology Franky Fraire  Nephro Dr. Rodriguez.

## 2021-10-04 NOTE — PROGRESS NOTE ADULT - PROBLEM SELECTOR PLAN 3
OREN superimposed on Stage III CKD, improving  mixed etiology prerenal, intrarenal and postrenal  Creatinine down-trending  avoid nephrotoxic agents, dose as per GFR  monitor creatinine, electrolytes  holding Lisinopril, Nifedipine incr. 90 mg daily   C3/ C4; ASO negative  Urine eosinophils negative  renal ultrasound negative for hydronephrosis, hyperechoic kidneys significant for medical renal disease  Nephrology Dr. Rodriguez.

## 2021-10-04 NOTE — PROGRESS NOTE ADULT - PROBLEM SELECTOR PLAN 7
Continue ASA, Lipitor and metoprolol  Hold losartan secondary to OREN  EF 55-60%, GIDD   Cardiology recommends SABIHA and L/R cardiac cath TAVR, after treatment of infection and medical optimization.

## 2021-10-04 NOTE — ADVANCED PRACTICE NURSE CONSULT - ASSESSMENT
This is a 76yr old male patient admitted for Headaches, presenting with the following:  -There is a R. Foot ulcer to which the patient is being followed by Podiatry with a treatment plan in place to address this issue  -There is a healed wound to the R. Gluteus and Coccyx area

## 2021-10-04 NOTE — PROGRESS NOTE ADULT - SUBJECTIVE AND OBJECTIVE BOX
[facility] NEPHROLOGY- PROGRESS NOTE    Patient is a 77yo Male with DM on insulin, HTN, HLD, CAD, s/p R partial 5th ray amputation 8/19/2021 p/w R foot pain and foul drainage a/w diabetic foot ulcer suspicious for osteomyelitis. s/p OR debridement today. Nephrology consulted for abrupt rise in SCr.   s/p ibrahim placement for distended bladder on 9/23 with ~400ml of urine o/p  9/27- pt with SOB; CXR with pulmonary edema- pt given Lasix 80mg IV x1. Concern for aspiration PNA  Course BP: s/p lasix 60mg IV on 10/1 & 10/2 and s/p Lasix 40mg IV x1 today 10/4    Hospital Medications: Medications reviewed.  REVIEW OF SYSTEMS:  CONSTITUTIONAL: No fevers or chills  RESPIRATORY: no shortness of breath  CARDIOVASCULAR: No chest pain.  GASTROINTESTINAL: No nausea or vomiting, or diarrhea or abdominal pain    VASCULAR: No bilateral lower extremity edema. Rt foot - denies pain    VITALS:  T(F): 97.6 (10-04-21 @ 14:00), Max: 98.1 (10-03-21 @ 21:23)  HR: 71 (10-04-21 @ 14:00)  BP: 112/50 (10-04-21 @ 14:00)  RR: 20 (10-04-21 @ 14:00)  SpO2: 90% (10-04-21 @ 14:00)  Wt(kg): --    10-04 @ 07:01  -  10-04 @ 14:37  --------------------------------------------------------  IN: 0 mL / OUT: 700 mL / NET: -700 mL      PHYSICAL EXAM:  Gen: NAD, calm  HEENT: MMM  Neck: no JVD  Cards: RRR, +S1/S2, +TRINIDAD  Resp: bilateral crackles, coarse BS  GI: soft, NT  Extremities: no LE edema B/L  Derm: Rt foot wrapped    LABS:  10-04    141  |  103  |  27<H>  ----------------------------<  91  3.5   |  25  |  2.13<H>    Ca    8.4      04 Oct 2021 06:10      Creatinine Trend: 2.13 <--, 1.70 <--, 1.49 <--, 1.48 <--, 1.66 <--, 1.73 <--, 2.24 <--                        8.9    5.87  )-----------( 291      ( 04 Oct 2021 06:10 )             27.4     Urine Studies:

## 2021-10-04 NOTE — PROGRESS NOTE ADULT - SUBJECTIVE AND OBJECTIVE BOX
NP Note discussed with  Primary Attending    Patient is a 76y old  Male who presents with a chief complaint of R Foot Pain (03 Oct 2021 19:05)      INTERVAL HPI/OVERNIGHT EVENTS: no new complaints    MEDICATIONS  (STANDING):  ampicillin/sulbactam  IVPB 3 Gram(s) IV Intermittent every 6 hours  aspirin  chewable 81 milliGRAM(s) Oral daily  atorvastatin 80 milliGRAM(s) Oral at bedtime  bisacodyl Suppository 10 milliGRAM(s) Rectal once  chlorhexidine 2% Cloths 1 Application(s) Topical <User Schedule>  cyanocobalamin 1000 MICROGram(s) Oral daily  ergocalciferol 40735 Unit(s) Oral <User Schedule>  ferrous    sulfate 325 milliGRAM(s) Oral daily  finasteride 5 milliGRAM(s) Oral daily  fluconAZOLE IVPB      fluconAZOLE IVPB 100 milliGRAM(s) IV Intermittent every 24 hours  heparin   Injectable 5000 Unit(s) SubCutaneous every 8 hours  influenza   Vaccine 0.5 milliLiter(s) IntraMuscular once  insulin lispro (ADMELOG) corrective regimen sliding scale   SubCutaneous Before meals and at bedtime  levoFLOXacin  Tablet 250 milliGRAM(s) Oral every 24 hours  metoprolol succinate  milliGRAM(s) Oral daily  NIFEdipine XL 60 milliGRAM(s) Oral daily  pantoprazole    Tablet 40 milliGRAM(s) Oral before breakfast  polyethylene glycol 3350 17 Gram(s) Oral daily  senna 2 Tablet(s) Oral at bedtime  tamsulosin 0.4 milliGRAM(s) Oral at bedtime    MEDICATIONS  (PRN):  ondansetron Injectable 4 milliGRAM(s) IV Push three times a day PRN Nausea and/or Vomiting  sodium chloride 0.9% lock flush 10 milliLiter(s) IV Push every 1 hour PRN Pre/post blood products, medications, blood draw, and to maintain line patency      __________________________________________________  REVIEW OF SYSTEMS:    CONSTITUTIONAL: No fever,   EYES: no acute visual disturbances  NECK: No pain or stiffness  RESPIRATORY: No cough; No shortness of breath  CARDIOVASCULAR: No chest pain, no palpitations  GASTROINTESTINAL: No pain. No nausea or vomiting; No diarrhea   NEUROLOGICAL: No headache or numbness, no tremors  MUSCULOSKELETAL: No joint pain, no muscle pain  GENITOURINARY: no dysuria, no frequency, no hesitancy  PSYCHIATRY: no depression , no anxiety  ALL OTHER  ROS negative        Vital Signs Last 24 Hrs  T(C): 36.7 (04 Oct 2021 05:03), Max: 36.7 (03 Oct 2021 21:23)  T(F): 98 (04 Oct 2021 05:03), Max: 98.1 (03 Oct 2021 21:23)  HR: 76 (04 Oct 2021 06:31) (72 - 76)  BP: 121/68 (04 Oct 2021 06:31) (110/56 - 121/68)  BP(mean): 81 (04 Oct 2021 06:31) (81 - 81)  RR: 18 (04 Oct 2021 06:31) (16 - 18)  SpO2: 90% (04 Oct 2021 06:31) (90% - 95%)    ________________________________________________  PHYSICAL EXAM:  GENERAL: NAD  HEENT: Normocephalic;  conjunctivae and sclerae clear; moist mucous membranes;   NECK : supple, No OJV  CHEST/LUNG: Diminished breath sounds bilateral bases  HEART: S1 S2  regular; no murmurs, gallops or rubs  ABDOMEN: Soft, Nontender, Nondistended; Bowel sounds present  EXTREMITIES: Right wound vac intact. No cyanosis, edema noted.  SKIN: Healed wounds to right gluteus and coccyx  NERVOUS SYSTEM:  Awake and alert; Orientated X 2. Intermittent confusion.    _________________________________________________  LABS:                        8.9    5.87  )-----------( 291      ( 04 Oct 2021 06:10 )             27.4     10-04    141  |  103  |  27<H>  ----------------------------<  91  3.5   |  25  |  2.13<H>    Ca    8.4      04 Oct 2021 06:10          CAPILLARY BLOOD GLUCOSE      POCT Blood Glucose.: 106 mg/dL (04 Oct 2021 07:50)  POCT Blood Glucose.: 125 mg/dL (03 Oct 2021 21:33)  POCT Blood Glucose.: 112 mg/dL (03 Oct 2021 16:26)  POCT Blood Glucose.: 109 mg/dL (03 Oct 2021 11:56)        RADIOLOGY & ADDITIONAL TESTS:    Imaging Personally Reviewed:  YES  < from: Xray Chest 1 View- PORTABLE-Routine (Xray Chest 1 View- PORTABLE-Routine in AM.) (10.02.21 @ 09:46) >  The lungs show residual unchanged perihilar/basilar diffuse airspace disease.. No pneumothorax.        The  heart is enlarged in transverse diameter. No hilar mass.  Status post median sternotomy.     Visualized osseous structures are intact.    IMPRESSION:   No significant change..    FOLLOW-UP AP PORTABLE CHEST RADIOGRAPH 10/2/2021 AT 9:30 AM:  There is persistent bilateral perihilar/basilar diffuse airspace disease and/or RIGHT effusion.  Cardiomegaly.    < end of copied text >  < from: CT Abdomen and Pelvis w/ Oral Cont (09.27.21 @ 15:53) >  CT of the Abdomen and Pelvis was performed.  Sagittal and coronal reformats were performed.    FINDINGS:  LOWER CHEST: Small bilateral pleural effusions with compressiveatelectasis of both lower lobes.    LIVER: Within normal limits.  BILE DUCTS: Normal caliber.  GALLBLADDER: Distended. Small layering gallstones.  SPLEEN: Within normal limits.  PANCREAS: Within normal limits.  ADRENALS: Within normal limits.  KIDNEYS/URETERS: No hydronephrosis. Nonobstructing left upper pole calculus, measuring 0.6 cm.    BLADDER: Urinary bladder contains air and a Molina catheter balloon.  REPRODUCTIVE ORGANS: Prostate is not enlarged.    BOWEL: No bowel obstruction. Appendix isnormal. Colonic diverticulosis.  PERITONEUM: Mild presacral fluid.  VESSELS: Atherosclerotic changes.  RETROPERITONEUM/LYMPH NODES: No lymphadenopathy.  ABDOMINAL WALL: Within normal limits.  BONES: Degenerative changes. Sternotomy.    IMPRESSION:  No acute intra-abdominal pathology.    Small bilateral pleural effusions with compressive atelectasis of both lower lobes.      < end of copied text >  < from: MR Foot No Cont, Right (09.17.21 @ 13:04) >  There is a resection at the mid aspect of the fifth metatarsal shaft. There is a lateral soft tissue wound beginning at the level of the amputation which extends distally. There is susceptibility artifact in the region of the amputation consistent with postoperative change. There is hyperintense T2 marrow signal throughout the remaining fifth metatarsal and within the adjacent fourth metatarsal head which is nonspecific and could be related to recent postoperative changes although osteomyelitis is suspected.    There is edema and mild atrophy within the plantar muscles of the foot. There is minimal spurring at the first metatarsophalangeal and hallux sesamoid articulations.    Impression:  Resection at the mid aspect of the fifth metatarsal. Lateral soft tissue wound with marrow signal abnormality throughout the fifth metatarsal and within the adjacent fourth metatarsal head which is nonspecific in the setting of recent surgery although osteomyelitis is suspected.      < end of copied text >      Consultant(s) Notes Reviewed:   YES    Care Discussed with Consultants :     Plan of care was discussed with patient and /or primary care giver; all questions and concerns were addressed and care was aligned with patient's wishes.

## 2021-10-04 NOTE — ADVANCED PRACTICE NURSE CONSULT - RECOMMEDATIONS
Clean all affected areas with normal saline and apply skin prep to the surrounding skin  -Apply a Foam dressing to the Coccyx area Q 72hrs PRN  -Elevate/float the patients heels using heel protectors and reposition the patient Q 2hrs using wedges or pillows

## 2021-10-04 NOTE — PROGRESS NOTE ADULT - PROBLEM SELECTOR PLAN 1
Afebrile  wound culture growing Enterococcus Aeromona and Klebsiella  c/w Unasyn IV for 6 wks until 11/3  as per ID Dr Barrett  Continue fluconazole and levaquin as per Dr Arnold Ferro completed.  S/P PICC placement 10/1  Continue wound vac as per Podiatry

## 2021-10-04 NOTE — PROGRESS NOTE ADULT - SUBJECTIVE AND OBJECTIVE BOX
Patient is a 76y old  Male who presents with a chief complaint of R Foot Pain (04 Oct 2021 15:32)    PATIENT IS SEEN AND EXAMINED IN MEDICAL FLOOR.  KEENANT [    ]    MADDY [   ]      GT [   ]    ALLERGIES:  No Known Allergies      Daily     Daily     VITALS:    Vital Signs Last 24 Hrs  T(C): 36.4 (04 Oct 2021 14:00), Max: 36.7 (03 Oct 2021 21:23)  T(F): 97.6 (04 Oct 2021 14:00), Max: 98.1 (03 Oct 2021 21:23)  HR: 71 (04 Oct 2021 14:00) (71 - 76)  BP: 112/50 (04 Oct 2021 14:00) (112/50 - 121/68)  BP(mean): 66 (04 Oct 2021 09:30) (66 - 81)  RR: 20 (04 Oct 2021 14:00) (16 - 20)  SpO2: 90% (04 Oct 2021 14:00) (90% - 92%)    LABS:    CBC Full  -  ( 04 Oct 2021 06:10 )  WBC Count : 5.87 K/uL  RBC Count : 3.04 M/uL  Hemoglobin : 8.9 g/dL  Hematocrit : 27.4 %  Platelet Count - Automated : 291 K/uL  Mean Cell Volume : 90.1 fl  Mean Cell Hemoglobin : 29.3 pg  Mean Cell Hemoglobin Concentration : 32.5 gm/dL  Auto Neutrophil # : x  Auto Lymphocyte # : x  Auto Monocyte # : x  Auto Eosinophil # : x  Auto Basophil # : x  Auto Neutrophil % : x  Auto Lymphocyte % : x  Auto Monocyte % : x  Auto Eosinophil % : x  Auto Basophil % : x      10-04    141  |  103  |  27<H>  ----------------------------<  91  3.5   |  25  |  2.13<H>    Ca    8.4      04 Oct 2021 06:10      CAPILLARY BLOOD GLUCOSE      POCT Blood Glucose.: 119 mg/dL (04 Oct 2021 16:55)  POCT Blood Glucose.: 116 mg/dL (04 Oct 2021 12:12)  POCT Blood Glucose.: 106 mg/dL (04 Oct 2021 07:50)  POCT Blood Glucose.: 125 mg/dL (03 Oct 2021 21:33)          Creatinine Trend: 2.13<--, 1.70<--, 1.49<--, 1.48<--, 1.66<--, 1.73<--  I&O's Summary    04 Oct 2021 07:01  -  04 Oct 2021 20:17  --------------------------------------------------------  IN: 0 mL / OUT: 700 mL / NET: -700 mL            .Tissue Right 5th toe r/o osteomyeltis  09-22 @ 13:54   No growth at 5 days  --    No polymorphonuclear cells seen per low power field  No organisms seen per oil power field      .Blood Blood-Venous  09-17 @ 10:28   No Growth Final  --  --      .Surgical Swab right foot wound  09-16 @ 21:42   Culture yields >4 types of aerobic and/or anaerobic bacteria  Call client services within 7 days if further workup is clinically  indicated. Culture includes  Rare Aeromonas hydrophila/caviae  Few Klebsiella oxytoca/Raoutella ornithinolytica  Few Enterococcus faecalis  Few Streptococcus agalactiae (Group B) isolated  Group B streptococci are susceptible to ampicillin,  penicillin and cefazolin, but may be resistant to  erythromycin and clindamycin.  Recommendations for intrapartum prophylaxis for Group B  streptococci are penicillin or ampicillin.  --  Aeromonas hydrophila/caviae  Klebsiella oxytoca /Raoutella ornithinolytica  Enterococcus faecalis      Bone R 5th metatarsal bone clean ma  08-20 @ 03:59   No growth at 5 days  --    No polymorphonuclear leukocytes seen per low power field  No organisms seen per oil power field      .Blood Blood-Venous  08-14 @ 21:09   No Growth Final  --  --      .Surgical Swab Right foot plantar wound  08-14 @ 21:08   Moderate Streptococcus agalactiae (Group B) isolated  Group B streptococci are susceptible to ampicillin,  penicillin and cefazolin, but may be resistant to  erythromycin and clindamycin.  Recommendations for intrapartum prophylaxis for Group B  streptococci are penicillin or ampicillin.  Moderate Bacteroides fragilis "Susceptibilities not performed"  Normal skin filiberto isolated  --  --          MEDICATIONS:    MEDICATIONS  (STANDING):  ampicillin/sulbactam  IVPB 3 Gram(s) IV Intermittent every 6 hours  aspirin  chewable 81 milliGRAM(s) Oral daily  atorvastatin 80 milliGRAM(s) Oral at bedtime  bisacodyl Suppository 10 milliGRAM(s) Rectal once  chlorhexidine 2% Cloths 1 Application(s) Topical <User Schedule>  cyanocobalamin 1000 MICROGram(s) Oral daily  ergocalciferol 32401 Unit(s) Oral <User Schedule>  ferrous    sulfate 325 milliGRAM(s) Oral daily  finasteride 5 milliGRAM(s) Oral daily  fluconAZOLE IVPB      fluconAZOLE IVPB 100 milliGRAM(s) IV Intermittent every 24 hours  heparin   Injectable 5000 Unit(s) SubCutaneous every 8 hours  influenza   Vaccine 0.5 milliLiter(s) IntraMuscular once  insulin lispro (ADMELOG) corrective regimen sliding scale   SubCutaneous Before meals and at bedtime  levoFLOXacin  Tablet 250 milliGRAM(s) Oral every 24 hours  metoprolol succinate  milliGRAM(s) Oral daily  NIFEdipine XL 60 milliGRAM(s) Oral daily  pantoprazole    Tablet 40 milliGRAM(s) Oral before breakfast  polyethylene glycol 3350 17 Gram(s) Oral daily  senna 2 Tablet(s) Oral at bedtime  tamsulosin 0.4 milliGRAM(s) Oral at bedtime      MEDICATIONS  (PRN):  ondansetron Injectable 4 milliGRAM(s) IV Push three times a day PRN Nausea and/or Vomiting  sodium chloride 0.9% lock flush 10 milliLiter(s) IV Push every 1 hour PRN Pre/post blood products, medications, blood draw, and to maintain line patency      REVIEW OF SYSTEMS:                           ALL ROS DONE [ X   ]    CONSTITUTIONAL:  LETHARGIC [   ], FEVER [   ], UNRESPONSIVE [   ]  CVS:  CP  [   ], SOB, [   ], PALPITATIONS [   ], DIZZYNESS [   ]  RS: COUGH [   ], SPUTUM [   ]  GI: ABDOMINAL PAIN [   ], NAUSEA [   ], VOMITINGS [   ], DIARRHEA [   ], CONSTIPATION [   ]  :  DYSURIA [   ], NOCTURIA [   ], INCREASED FREQUENCY [   ], DRIBLING [   ],  SKELETAL: PAINFUL JOINTS [   ], SWOLLEN JOINTS [   ], NECK ACHE [   ], LOW BACK ACHE [   ],  SKIN : ULCERS [   ], RASH [   ], ITCHING [   ]  CNS: HEAD ACHE [   ], DOUBLE VISION [   ], BLURRED VISION [   ], AMS / CONFUSION [   ], SEIZURES [   ], WEAKNESS [   ],TINGLING / NUMBNESS [   ]    PHYSICAL EXAMINATION:  GENERAL APPEARANCE: NO DISTRESS  HEENT:  NO PALLOR, NO  JVD,  NO   NODES, NECK SUPPLE  CVS: S1 +, S2 +,   RS: AEEB,  OCCASIONAL  RALES +,   NO RONCHI, CRACKLES +  ABD: SOFT, NT, NO, BS +       EXT: NO PE  SKIN: WARM,   RIGHT FOOT WRAPPED W/ WOUND VAC IN PLACE  SKELETAL:  ROM ACCEPTABLE  CNS:  AAO X  2     RADIOLOGY :    EXAM:  CT ABDOMEN AND PELVIS OC                            PROCEDURE DATE:  09/27/2021          INTERPRETATION:  CLINICAL INFORMATION: Small bowel obstruction.    COMPARISON: None.    CONTRAST/COMPLICATIONS:  IV Contrast: NONE  Oral Contrast: Omnipaque 300  Complications: None reported at time of study completion    PROCEDURE:  CT of the Abdomen and Pelvis was performed.  Sagittal and coronal reformats were performed.    FINDINGS:  LOWER CHEST: Small bilateral pleural effusions with compressiveatelectasis of both lower lobes.    LIVER: Within normal limits.  BILE DUCTS: Normal caliber.  GALLBLADDER: Distended. Small layering gallstones.  SPLEEN: Within normal limits.  PANCREAS: Within normal limits.  ADRENALS: Within normal limits.  KIDNEYS/URETERS: No hydronephrosis. Nonobstructing left upper pole calculus, measuring 0.6 cm.    BLADDER: Urinary bladder contains air and a Castañeda catheter balloon.  REPRODUCTIVE ORGANS: Prostate is not enlarged.    BOWEL: No bowel obstruction. Appendix isnormal. Colonic diverticulosis.  PERITONEUM: Mild presacral fluid.  VESSELS: Atherosclerotic changes.  RETROPERITONEUM/LYMPH NODES: No lymphadenopathy.  ABDOMINAL WALL: Within normal limits.  BONES: Degenerative changes. Sternotomy.    IMPRESSION:  No acute intra-abdominal pathology.    Small bilateral pleural effusions with compressive atelectasis of both lower lobes.    ====================================================    EXAM:  MR FOOT RT                            PROCEDURE DATE:  09/17/2021          INTERPRETATION:  Clinical Information: Recent fifth metatarsal head resection now with fifth digit pain, swelling and foul discharge.    Comparison: Radiographs of the right foot from 9/16/2021 and MRI the right foot from 8/16/2021.    Technique:  MRI of the right midfoot and forefoot.  Intravenous Contrast: None.    Findings:    There is a resection at the mid aspect of the fifth metatarsal shaft. There is a lateral soft tissue wound beginning at the level of the amputation which extends distally. There is susceptibility artifact in the region of the amputation consistent with postoperative change. There is hyperintense T2 marrow signal throughout the remaining fifth metatarsal and within the adjacent fourth metatarsal head which is nonspecific and could be related to recent postoperative changes although osteomyelitis is suspected.    There is edema and mild atrophy within the plantar muscles of the foot. There is minimal spurring at the first metatarsophalangeal and hallux sesamoid articulations.    Impression:  Resection at the mid aspect of the fifth metatarsal. Lateral soft tissue wound with marrow signal abnormality throughout the fifth metatarsal and within the adjacent fourth metatarsal head which is nonspecific in the setting of recent surgery although osteomyelitis is suspected.        ASSESSMENT :     Headache    HTN (hypertension)    HLD (hyperlipidemia)    DM (diabetes mellitus)    CAD (coronary artery disease)    Glaucoma, angle-closure    [place] (hard of hearing)    No significant past surgical history    S/P CABG (coronary artery bypass graft)    History of thyroid surgery        PLAN:  HPI:  76M from home, lives with daughter w/ PMH DM on insulin, HTN, HLD, CAD, 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 Pt with sharp pain on the R lateral foot. As per daughter, pt was taking tylenol which did not help his pain prompting the patient to ask his daughter to take him to the hospital. Pt is a poor historian. Daughter states that the patient did not see his podiatrist after the surgery. No fever, chest, pain, palpitations, nausea, vomiting, diarrhea (17 Sep 2021 01:11)    # PATIENT FOR D/C TO [facility] FOR [facility] - D/W CM TEAM 10/2, AWAITING AUTHORIZATION / ANNETTA PER CM TEAM.    # SUSPECT RIGHT FOOT OSTEOMYELITIS S/P RIGHT 5TH RAY RESECTION [8/19] AND S/P DEBRIDEMENT, GRAFT APPLICATION, BONE BX AND WOUND VAC APPLICATION 9/22  ** RECENT ADMISSION FOR RIGHT DIABETIC FOOT ULCER, CELLULITIS, OSTEOMYELITIS RIGHT 5th METATARSAL S/P RIGHT 5TH PARTIAL RAY AMPUTATION [8/20] - BONE PATHOLOGY W/ CLEAN MARGINS    - S/P VANCOMYCIN AND ZOSYN ; NOW ON UNASYN AND LEVAQUIN GIVEN OREN; PER ID SWITCH TO ZOSYN UNTIL 11/3  - RECENTLY HAD SIMON W/ MILD PAD  - REVIEWED WOUND CX [ ENTEROCOCCUS, AEROMONAS, KLEBSIELLA] AND BCX  - BONE BX - acute osteomyelitis and necrotic periosteal tissue.   - ID CONSULT, PODIATRY CONSULT    - PICC LINE PLACED TODAY TO COMPLETE 6 WEEKS OF ANTIBIOTICS - PER ID SWITCH TO ZOSYN UNTIL 11/3    # ACUTE HYPOXIC RESPIRATORY FAILURE SUSPECT S/T PULMONARY EDEMA IN THE SETTING OF SEVERE AORTIC STENOSIS + ASPIRATION PNA - ON LEVAQUIN, STARTED LASIX, CARDIOLOGY CONSULT IN PROGRESS  - S/P CRITICAL CARE CONSULT  - S/P LASIX ON 10/1 AND 10/2    - TODAY WITH WORSENING HYPOXIA - RECHECKED CXR AND GIVING LASIX    # FUNGURIA - ON FLUCONAZOLE    # BOWEL DISTENTION - ? ILEUS - OPTIMIZING ELECTROLYTES - IMPROVED  - SURGERY CONSULT IN PROGRESS  - PASSED 2 BMS ON 9/27    # ASPIRATION EVENT WHEN PATIENT REMOVED NGT - ON LEVAQUIN, ID CONSULT IN PROGRESS    # CHOLELITHIASIS - TRENDING LFTS, RUQ U/S - CHOLELITHIASIS, SURGERY CONSULT IN PROGRESS  - GI CONSULT IN PROGRESS - LESS SUSPICIOUS FOR CHOLECYSTITIS    # ? DYSPEPSIA - PLACED ON PPI BID, IMPROVED, UNDERWENT ST EVAL - TOLERATING DIET     # ELEVATED TROPONINS - NSTEMI - ? DEMAND - WILL NEED ISCHEMIC EVAL ONCE ACUTE INFECTION RESOLVES PER CARDIOLOGY, CARDIOLOGY CONSULT IN PROGRESS  - ON ASA, STATIN, BB    # OREN ON CKD - S/T ATN AND URINARY RETENTION - S/P IVF, NOW W/ CASTAÑEDA, NEPHROLOGY CONUSLT IN PROGRESS  - ON FLOMAX, ADDED FINASTERIDE    - WITH WORSENING RENAL FXN - NOTED URINARY RETENTION [10/4] - REPLACED CASTAÑEDA    # MEDICAL CLEARANCE FOR SURGERY - RCRI - 2 - 10.1 % 30 DAY RISK OF DEATH, MI OR CARDIAC ARREST  - CARDIOLOGY CONSULT     # DM -  HBA1C - 11  - LANTUS, SSI + FS    # CAD S/P CABG - PLACED ON ASA, STATIN, BB  - ECHO - SEVERE AORTIC STENOSIS, SEVERE CONCENTRIC LVH, G1DD, MILD OR  - CARDIOLOGY CONSULT - DR. BASSETT   - MAY NEED ISCHEMIC EVAL ONCE ACUTE ILLNESS RESOLVES    # HTN - ON METOPROLOL, NICARDIPINE    # SEVERE, ASYMPTOMATIC, STENOSIS - ONCE ACUTE ILLNESS RESOLVES, WILL LIKELY NEED ISCHEMIC WORKUP, SABIHA TO EVALUATE FURTHER. D/W PATIENT, DAUGHTER AND CARDIOLOGY TEAM [PREVIOUSLY SAW DR. BASSETT]    # VITAMIN D DEFICIENCY - ON CHOLECALCIFEROL    # HLD - STATIN    # CASE DISCUSSED AT LENGTH WITH PATIENT AND DAUGHTER, BIRDIE ROBERT AT BEDSIDE AND VIA PHONE @ [phone] [10/1]  # PATIENT AND DAUGHTER REFUSED [facility] ON PREVIOUS ADMISSION, PREVIOUSLY WISHED FOR PATIENT RETURN HOME W/ HOME PT; HOWEVER NOW, DAUGHTER WISHES FOR PATIENT TO GO TO [facility], AS PATIENT'S WIFE IS CURRENTLY ADMITTED THERE    # GI AND DVT PPX

## 2021-10-04 NOTE — PROGRESS NOTE ADULT - ASSESSMENT
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  As per daughter, patient was taking tylenol which did not help his pain prompting the patient to ask his daughter to take him to the hospital. ON admission, he has no fever or Leukocytosis. The Xray of Right foot shows no Osteomyelitis but MRI of Right foot shows Lateral soft tissue wound with marrow signal abnormality throughout the fifth metatarsal which is consistent of Osteomyelitis. He has seen by Podiatry and wound culture has sent, Zosyn and Vancomycin has started. The ID consult requested to assist with further evaluation and antibiotic management.     # Right Fifth metatarsal DFU with drainage and Osteomyelitis- wound cx grew Enterococcus, Aeromonas and Klebsiella - Zosyn is the ideal to cover All organisms but kidney function is worsening, hence change to Unasyn and Levaquin until kidney function is improved - The pathology shows Bone with acute osteomyelitis and necrotic periosteal tissue.   # OREN- s/p urinary retention -s/p ibrahim catheter - s/p discontinue ACEI  # RLL pneumonia- most likely Aspiration, S/p Vomiting - On Unasyn  # Large bowel distension  # Candiduria- 9/22/21    would recommend:    1. OOB to chair   2. Monitor  kidney function and c/w adjusted doses of Levaquin and  Unasyn as per crcl  3. Wound care as per Podiatry  4. Aspiration precaution  5. May change to Zosyn and oral fluconazole on discharge to continue until 11/3/21 to cover Osteomyelitis    d/w Covering Bhavana GOOD     Attending Attestation:    Spent more than 35 minutes on total encounter, more than 50 % of the visit was spent counseling and/or coordinating care by the Attending physician.

## 2021-10-04 NOTE — PROGRESS NOTE ADULT - ASSESSMENT
A:   s/p R 5th ray resection - 8/19  s/p R 5th wound debridement, graft application, bone biopsy and wound vac application 9/22       P:  Patient evaluated, chart reviewed  MRI reviewed   Intra-op culture and biopsy - no growth   Dressing removed   Wound Vac changed today (10/4)  Recommend elevation of b/l legs   Continue local wound care to allow graft incorporation  Continue abx per ID recommendation  Pt stable from podiatry standpoint  Upon discharge:  Please keep dressing clean, dry and intact  Pt nonwb to R foot  Change wound vac every other day to R 5th ray surgical site   Pt to follow up in outpatient clinic at [address] [facility] [address]. Call [phone] to make appointment  Discussed with Dr. Vazquez

## 2021-10-04 NOTE — PROGRESS NOTE ADULT - ASSESSMENT
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 s/p OR debridement today. Nephrology consulted for abrupt rise in SCr.     1. OREN- likely ATN in the setting of infection and ACEi use. Lisinopril discontinued 9/22. Again pt with coarse BS, crackles, CXR 10/2 c/w pulm edema.  Pt was given Furosemide on10/1, 10/2 and today.   ATN was resolving, however renal fxn now worsened please check post void bladder scan to r/o retention. Pt with coarse BS, crackles, CXR c/w pulm edema.  Pt was given Furosemide again today. Mild hypokalemia- borderline; recc KCl 20meq PO x1. Monitor lytes.   Kidney/ Bladder US with large distended bladder s/p ibrahim placement on 9/23; renal function did not improve post ibrahim; less likely due to obstructive uropathy. UA with 30 protein, trace blood with small LE  FeNa 0.72% and FeUrea 19.39%; consistent with pre-renal OREN. However renal function worsening on IVF. No peripheral eosinophilia- no signs of AIN. C3 & C4 wnl with neg ASO- no signs of PIGN. Strict I/Os. Avoid nephrotoxins/ NSAIDs/ RCA. Monitor BMP.    2. 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 Will defer secondary w/u as an outpt. Avoid nephrotoxins  3. HTN 2/2 CKD- BP acceptable. Lisinopril d/c due to OREN. c/w Nifedipine ER 60mg PO qd. Monitor BP  4. Acute osteomyelitis- s/p debridement 9/22. Pt on Unasyn & Levaquin. Plan as per ID      [facility]PHR[facility]  Bryn Johnson M.D.  Domingo Booth D.O.  Zakia Rodriguez M.D.  Zonia Adams, MSN, ANP-C  [phone]    [address] [place] [address]

## 2021-10-04 NOTE — PROGRESS NOTE ADULT - PROBLEM SELECTOR PLAN 10
Continue antibiotics thru 11/3 as per ID  PT recommending [facility]. Pending auth.  Discharge planning started.  Continue wound vac as per Podiatry.  Medically stable for discharge to charge to [facility].

## 2021-10-05 DIAGNOSIS — J90 PLEURAL EFFUSION, NOT ELSEWHERE CLASSIFIED: ICD-10-CM

## 2021-10-05 LAB
ALBUMIN SERPL ELPH-MCNC: 2.5 G/DL — LOW (ref 3.5–5)
ALBUMIN SERPL ELPH-MCNC: 2.6 G/DL — LOW (ref 3.5–5)
ALP SERPL-CCNC: 108 U/L — SIGNIFICANT CHANGE UP (ref 40–120)
ALP SERPL-CCNC: 99 U/L — SIGNIFICANT CHANGE UP (ref 40–120)
ALT FLD-CCNC: 16 U/L DA — SIGNIFICANT CHANGE UP (ref 10–60)
ALT FLD-CCNC: 18 U/L DA — SIGNIFICANT CHANGE UP (ref 10–60)
ANION GAP SERPL CALC-SCNC: 14 MMOL/L — SIGNIFICANT CHANGE UP (ref 5–17)
ANION GAP SERPL CALC-SCNC: 14 MMOL/L — SIGNIFICANT CHANGE UP (ref 5–17)
AST SERPL-CCNC: 27 U/L — SIGNIFICANT CHANGE UP (ref 10–40)
AST SERPL-CCNC: 28 U/L — SIGNIFICANT CHANGE UP (ref 10–40)
BASE EXCESS BLDA CALC-SCNC: 2.2 MMOL/L — SIGNIFICANT CHANGE UP (ref -2–3)
BILIRUB SERPL-MCNC: 0.5 MG/DL — SIGNIFICANT CHANGE UP (ref 0.2–1.2)
BILIRUB SERPL-MCNC: 0.5 MG/DL — SIGNIFICANT CHANGE UP (ref 0.2–1.2)
BLOOD GAS COMMENTS ARTERIAL: SIGNIFICANT CHANGE UP
BUN SERPL-MCNC: 26 MG/DL — HIGH (ref 7–18)
BUN SERPL-MCNC: 27 MG/DL — HIGH (ref 7–18)
CALCIUM SERPL-MCNC: 8.4 MG/DL — SIGNIFICANT CHANGE UP (ref 8.4–10.5)
CALCIUM SERPL-MCNC: 8.8 MG/DL — SIGNIFICANT CHANGE UP (ref 8.4–10.5)
CHLORIDE SERPL-SCNC: 104 MMOL/L — SIGNIFICANT CHANGE UP (ref 96–108)
CHLORIDE SERPL-SCNC: 105 MMOL/L — SIGNIFICANT CHANGE UP (ref 96–108)
CHLORIDE UR-SCNC: <10 MMOL/L — SIGNIFICANT CHANGE UP
CO2 SERPL-SCNC: 24 MMOL/L — SIGNIFICANT CHANGE UP (ref 22–31)
CO2 SERPL-SCNC: 25 MMOL/L — SIGNIFICANT CHANGE UP (ref 22–31)
CREAT ?TM UR-MCNC: 123 MG/DL — SIGNIFICANT CHANGE UP
CREAT SERPL-MCNC: 2.11 MG/DL — HIGH (ref 0.5–1.3)
CREAT SERPL-MCNC: 2.17 MG/DL — HIGH (ref 0.5–1.3)
GLUCOSE BLDC GLUCOMTR-MCNC: 110 MG/DL — HIGH (ref 70–99)
GLUCOSE BLDC GLUCOMTR-MCNC: 111 MG/DL — HIGH (ref 70–99)
GLUCOSE BLDC GLUCOMTR-MCNC: 111 MG/DL — HIGH (ref 70–99)
GLUCOSE BLDC GLUCOMTR-MCNC: 114 MG/DL — HIGH (ref 70–99)
GLUCOSE BLDC GLUCOMTR-MCNC: 133 MG/DL — HIGH (ref 70–99)
GLUCOSE SERPL-MCNC: 103 MG/DL — HIGH (ref 70–99)
GLUCOSE SERPL-MCNC: 111 MG/DL — HIGH (ref 70–99)
HCO3 BLDA-SCNC: 25 MMOL/L — SIGNIFICANT CHANGE UP (ref 21–28)
HCT VFR BLD CALC: 27.4 % — LOW (ref 39–50)
HCT VFR BLD CALC: 30.3 % — LOW (ref 39–50)
HGB BLD-MCNC: 10 G/DL — LOW (ref 13–17)
HGB BLD-MCNC: 8.8 G/DL — LOW (ref 13–17)
MAGNESIUM SERPL-MCNC: 2.2 MG/DL — SIGNIFICANT CHANGE UP (ref 1.6–2.6)
MAGNESIUM SERPL-MCNC: 2.4 MG/DL — SIGNIFICANT CHANGE UP (ref 1.6–2.6)
MCHC RBC-ENTMCNC: 29.1 PG — SIGNIFICANT CHANGE UP (ref 27–34)
MCHC RBC-ENTMCNC: 29.8 PG — SIGNIFICANT CHANGE UP (ref 27–34)
MCHC RBC-ENTMCNC: 32.1 GM/DL — SIGNIFICANT CHANGE UP (ref 32–36)
MCHC RBC-ENTMCNC: 33 GM/DL — SIGNIFICANT CHANGE UP (ref 32–36)
MCV RBC AUTO: 90.2 FL — SIGNIFICANT CHANGE UP (ref 80–100)
MCV RBC AUTO: 90.7 FL — SIGNIFICANT CHANGE UP (ref 80–100)
NRBC # BLD: 0 /100 WBCS — SIGNIFICANT CHANGE UP (ref 0–0)
NRBC # BLD: 0 /100 WBCS — SIGNIFICANT CHANGE UP (ref 0–0)
OB PNL STL: NEGATIVE — SIGNIFICANT CHANGE UP
PCO2 BLDA: 33 MMHG — LOW (ref 35–48)
PH BLDA: 7.49 — HIGH (ref 7.35–7.45)
PHOSPHATE SERPL-MCNC: 4 MG/DL — SIGNIFICANT CHANGE UP (ref 2.5–4.5)
PHOSPHATE SERPL-MCNC: 4 MG/DL — SIGNIFICANT CHANGE UP (ref 2.5–4.5)
PLATELET # BLD AUTO: 298 K/UL — SIGNIFICANT CHANGE UP (ref 150–400)
PLATELET # BLD AUTO: 332 K/UL — SIGNIFICANT CHANGE UP (ref 150–400)
PO2 BLDA: 52 MMHG — LOW (ref 83–108)
POTASSIUM SERPL-MCNC: 3.4 MMOL/L — LOW (ref 3.5–5.3)
POTASSIUM SERPL-MCNC: 3.8 MMOL/L — SIGNIFICANT CHANGE UP (ref 3.5–5.3)
POTASSIUM SERPL-SCNC: 3.4 MMOL/L — LOW (ref 3.5–5.3)
POTASSIUM SERPL-SCNC: 3.8 MMOL/L — SIGNIFICANT CHANGE UP (ref 3.5–5.3)
PROT SERPL-MCNC: 6.9 G/DL — SIGNIFICANT CHANGE UP (ref 6–8.3)
PROT SERPL-MCNC: 7.2 G/DL — SIGNIFICANT CHANGE UP (ref 6–8.3)
RBC # BLD: 3.02 M/UL — LOW (ref 4.2–5.8)
RBC # BLD: 3.36 M/UL — LOW (ref 4.2–5.8)
RBC # FLD: 14.1 % — SIGNIFICANT CHANGE UP (ref 10.3–14.5)
RBC # FLD: 14.4 % — SIGNIFICANT CHANGE UP (ref 10.3–14.5)
SAO2 % BLDA: 86 % — SIGNIFICANT CHANGE UP
SODIUM SERPL-SCNC: 143 MMOL/L — SIGNIFICANT CHANGE UP (ref 135–145)
SODIUM SERPL-SCNC: 143 MMOL/L — SIGNIFICANT CHANGE UP (ref 135–145)
SODIUM UR-SCNC: 44 MMOL/L — SIGNIFICANT CHANGE UP
UUN UR-MCNC: 552 MG/DL — SIGNIFICANT CHANGE UP
WBC # BLD: 6.55 K/UL — SIGNIFICANT CHANGE UP (ref 3.8–10.5)
WBC # BLD: 7.73 K/UL — SIGNIFICANT CHANGE UP (ref 3.8–10.5)
WBC # FLD AUTO: 6.55 K/UL — SIGNIFICANT CHANGE UP (ref 3.8–10.5)
WBC # FLD AUTO: 7.73 K/UL — SIGNIFICANT CHANGE UP (ref 3.8–10.5)

## 2021-10-05 PROCEDURE — 71045 X-RAY EXAM CHEST 1 VIEW: CPT | Mod: 26

## 2021-10-05 PROCEDURE — 71250 CT THORAX DX C-: CPT | Mod: 26

## 2021-10-05 PROCEDURE — 99232 SBSQ HOSP IP/OBS MODERATE 35: CPT

## 2021-10-05 RX ORDER — MIRTAZAPINE 45 MG/1
7.5 TABLET, ORALLY DISINTEGRATING ORAL DAILY
Refills: 0 | Status: DISCONTINUED | OUTPATIENT
Start: 2021-10-05 | End: 2021-10-05

## 2021-10-05 RX ORDER — ALBUMIN HUMAN 25 %
50 VIAL (ML) INTRAVENOUS EVERY 4 HOURS
Refills: 0 | Status: DISCONTINUED | OUTPATIENT
Start: 2021-10-05 | End: 2021-10-05

## 2021-10-05 RX ORDER — CHLORHEXIDINE GLUCONATE 213 G/1000ML
1 SOLUTION TOPICAL
Refills: 0 | Status: DISCONTINUED | OUTPATIENT
Start: 2021-10-05 | End: 2021-10-05

## 2021-10-05 RX ORDER — ALBUMIN HUMAN 25 %
50 VIAL (ML) INTRAVENOUS EVERY 6 HOURS
Refills: 0 | Status: COMPLETED | OUTPATIENT
Start: 2021-10-05 | End: 2021-10-07

## 2021-10-05 RX ORDER — FUROSEMIDE 40 MG
40 TABLET ORAL EVERY 6 HOURS
Refills: 0 | Status: COMPLETED | OUTPATIENT
Start: 2021-10-05 | End: 2021-10-07

## 2021-10-05 RX ADMIN — Medication 60 MILLIGRAM(S): at 05:30

## 2021-10-05 RX ADMIN — AMPICILLIN SODIUM AND SULBACTAM SODIUM 200 GRAM(S): 250; 125 INJECTION, POWDER, FOR SUSPENSION INTRAMUSCULAR; INTRAVENOUS at 12:13

## 2021-10-05 RX ADMIN — AMPICILLIN SODIUM AND SULBACTAM SODIUM 200 GRAM(S): 250; 125 INJECTION, POWDER, FOR SUSPENSION INTRAMUSCULAR; INTRAVENOUS at 00:10

## 2021-10-05 RX ADMIN — MIRTAZAPINE 7.5 MILLIGRAM(S): 45 TABLET, ORALLY DISINTEGRATING ORAL at 13:20

## 2021-10-05 RX ADMIN — PANTOPRAZOLE SODIUM 40 MILLIGRAM(S): 20 TABLET, DELAYED RELEASE ORAL at 06:14

## 2021-10-05 RX ADMIN — TAMSULOSIN HYDROCHLORIDE 0.4 MILLIGRAM(S): 0.4 CAPSULE ORAL at 22:05

## 2021-10-05 RX ADMIN — Medication 40 MILLIGRAM(S): at 16:49

## 2021-10-05 RX ADMIN — Medication 50 MILLILITER(S): at 23:28

## 2021-10-05 RX ADMIN — Medication 81 MILLIGRAM(S): at 12:13

## 2021-10-05 RX ADMIN — AMPICILLIN SODIUM AND SULBACTAM SODIUM 200 GRAM(S): 250; 125 INJECTION, POWDER, FOR SUSPENSION INTRAMUSCULAR; INTRAVENOUS at 05:16

## 2021-10-05 RX ADMIN — ATORVASTATIN CALCIUM 80 MILLIGRAM(S): 80 TABLET, FILM COATED ORAL at 22:05

## 2021-10-05 RX ADMIN — Medication 100 MILLIGRAM(S): at 05:30

## 2021-10-05 RX ADMIN — FLUCONAZOLE 100 MILLIGRAM(S): 150 TABLET ORAL at 02:11

## 2021-10-05 RX ADMIN — Medication 50 MILLILITER(S): at 16:51

## 2021-10-05 RX ADMIN — PREGABALIN 1000 MICROGRAM(S): 225 CAPSULE ORAL at 12:13

## 2021-10-05 RX ADMIN — FINASTERIDE 5 MILLIGRAM(S): 5 TABLET, FILM COATED ORAL at 12:13

## 2021-10-05 RX ADMIN — CHLORHEXIDINE GLUCONATE 1 APPLICATION(S): 213 SOLUTION TOPICAL at 05:16

## 2021-10-05 RX ADMIN — HEPARIN SODIUM 5000 UNIT(S): 5000 INJECTION INTRAVENOUS; SUBCUTANEOUS at 05:17

## 2021-10-05 RX ADMIN — AMPICILLIN SODIUM AND SULBACTAM SODIUM 200 GRAM(S): 250; 125 INJECTION, POWDER, FOR SUSPENSION INTRAMUSCULAR; INTRAVENOUS at 18:14

## 2021-10-05 RX ADMIN — HEPARIN SODIUM 5000 UNIT(S): 5000 INJECTION INTRAVENOUS; SUBCUTANEOUS at 13:21

## 2021-10-05 RX ADMIN — Medication 325 MILLIGRAM(S): at 12:13

## 2021-10-05 NOTE — PROGRESS NOTE ADULT - PROBLEM SELECTOR PLAN 5
Continue metoprolol, Nifedipine XL  Lisinopril on hold secondary to OREN Will need SABIHA, R&L heart cath and TAVR workup once medically stable and infection treated.

## 2021-10-05 NOTE — PROGRESS NOTE ADULT - PROBLEM SELECTOR PLAN 3
OREN superimposed on Stage III CKD   mixed etiology prerenal, intrarenal and postrenal  Creatinine  unchanged even with ibrahim cath   avoid nephrotoxic agents, dose as per GFR  monitor creatinine, electrolytes  holding Lisinopril, Nifedipine incr. 90 mg daily   C3/ C4; ASO negative  Urine eosinophils negative  renal ultrasound negative for hydronephrosis, hyperechoic kidneys significant for medical renal disease  Nephrology Dr. Rodriguez. Secondary to severe AS  Cardiology recommending SABIHA and R+L heart cath/ TAVR once he is medically optimized and infection treated  Cardiology Franky Fraire  Nephro Dr. Rodriguez  s/p lasix tx, noted O2 sat of 89% on 2L O2 - will get urgent CT chest without contrast   then consider pulm consult

## 2021-10-05 NOTE — CONSULT NOTE ADULT - ASSESSMENT
B/l pleural effusions R>L  1. Unable to place pigtail catheter secondary to difficulty in patient positioning and unable to visualize effusion well with ultrasound  2. Recommend IR evaluation for placement of pigtail catheter  3. Will follow  4. D/w Dr. Candelaria and agreed

## 2021-10-05 NOTE — PROGRESS NOTE ADULT - PROBLEM SELECTOR PLAN 6
Continue sliding scale coverage.  Monitor glucose.  A1c  11.4 Continue metoprolol, Nifedipine XL  Lisinopril on hold secondary to OREN

## 2021-10-05 NOTE — PROGRESS NOTE ADULT - ASSESSMENT
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 Of note, the patient never followed up with podiatry after his procedure in August. MRI confirms suspected osteomyelitis - patient followed by podiatry and ID, patient undergo debridement and wound VAC placement. Patient currently on course of Unasyn and Levofloxacin, ID. Surgical pathology resulted OM, wound culture resulting Enterococcus Aeromonal and Klebsiella- treated initially with Zosyn however hospital course complicated with OREN likely mixed etiology with prerenal from active infection and pulmonary edema, intrarenal due to toxicity from IV antibiotics and post renal from urinary retention, Nephrology consulted for optimization of kidney function. IV antibiotic regimen switched to Unasyn and Levaquin, sensitive for organism, will need 6 weeks of IV antibiotics. Patient subsequently treated for Pulmonary Edema with IV lasix, Cardiology recommending SABIHA with L/R heart cath once infection clears and medically stable. Hospital course further complicated by abdominal distention and constipation for which he received lactulose and vomited. The patient had subsequent respiratory distress and it is suspected he aspirated as CXR showed possible RLL PNA. AXR shows distended loops of bowel for which NG tube was placed which the patient removed overnight 9/26 - re-attempts to replace NG tube were unsuccessful as patient was non-cooperative. Surgery consulted. CT abdomen deferred for now as patient is unstable and will not tolerate lying flat. ICU consulted given patient's deterioration in clinical status.  today pt noted to have low O2 saturation on 2L N-C, will get urgent CT chest to r/o pul edema vs Pneumonia. [facility] , facility requires wound vac to be delivered prior to pt transferring to facility. also, pt with declined in appetite, reporting some frustration due to extended hospital stay, starting remeron 7.5mg QD and will provide emotional support. Will consider pulm consult after reviewing CT chest      Patient is a 76 year old male with PMH of poorly controlled IDDM, HTN, HLD, stage III CKD, CAD s/p CABG and recent right partial 5th foot amputation (8/19/21) who presented to ED with c/o right foot pain associated with discharge and foul odor. Of note, the patient never followed up with podiatry after his procedure in August. MRI confirms suspected osteomyelitis - patient followed by podiatry and ID, patient undergo debridement and wound VAC placement. Patient currently on course of Unasyn and Levofloxacin, ID. Surgical pathology resulted OM, wound culture resulting Enterococcus Aeromonal and Klebsiella- treated initially with Zosyn however hospital course complicated with OREN likely mixed etiology with prerenal from active infection and pulmonary edema, intrarenal due to toxicity from IV antibiotics and post renal from urinary retention, Nephrology consulted for optimization of kidney function. IV antibiotic regimen switched to Unasyn and Levaquin, sensitive for organism, will need 6 weeks of IV antibiotics. Patient subsequently treated for Pulmonary Edema with IV lasix, Cardiology recommending SABIHA with L/R heart cath once infection clears and medically stable. Hospital course further complicated by abdominal distention and constipation for which he received lactulose and vomited. The patient had subsequent respiratory distress and it is suspected he aspirated as CXR showed possible RLL PNA. AXR shows distended loops of bowel for which NG tube was placed which the patient removed overnight 9/26 - re-attempts to replace NG tube were unsuccessful as patient was non-cooperative. Surgery consulted. CT abdomen deferred for now as patient is unstable and will not tolerate lying flat. ICU consulted given patient's deterioration in clinical status.  today pt noted to have low O2 saturation on 2L N-C, will get urgent CT chest to r/o pul edema vs Pneumonia. [facility] per CM , facility requires wound vac to be delivered prior to pt transferring to facility. also, pt with declined in appetite, reporting some frustration due to extended hospital stay, starting remeron 7.5mg QD and will provide emotional support. Will consider pulm consult after reviewing CT chest, CT chest with  large b/l pleural effusion and PNA, DR. Xavier consulted for further mgmt,  will start albumin + lasxi tx today and  will re-eval pt in AM then if needed thoracentesis

## 2021-10-05 NOTE — PROGRESS NOTE ADULT - SUBJECTIVE AND OBJECTIVE BOX
C A R D I O L O G Y  **********************************    DATE OF SERVICE: 10-05-21    Patient denies chest pain or shortness of breath.   Review of symptoms otherwise negative.    ampicillin/sulbactam  IVPB 3 Gram(s) IV Intermittent every 6 hours  aspirin  chewable 81 milliGRAM(s) Oral daily  atorvastatin 80 milliGRAM(s) Oral at bedtime  bisacodyl Suppository 10 milliGRAM(s) Rectal once  chlorhexidine 2% Cloths 1 Application(s) Topical <User Schedule>  cyanocobalamin 1000 MICROGram(s) Oral daily  ergocalciferol 12722 Unit(s) Oral <User Schedule>  ferrous    sulfate 325 milliGRAM(s) Oral daily  finasteride 5 milliGRAM(s) Oral daily  fluconAZOLE IVPB      fluconAZOLE IVPB 100 milliGRAM(s) IV Intermittent every 24 hours  heparin   Injectable 5000 Unit(s) SubCutaneous every 8 hours  influenza   Vaccine 0.5 milliLiter(s) IntraMuscular once  insulin lispro (ADMELOG) corrective regimen sliding scale   SubCutaneous Before meals and at bedtime  levoFLOXacin  Tablet 250 milliGRAM(s) Oral every 24 hours  metoprolol succinate  milliGRAM(s) Oral daily  mirtazapine 7.5 milliGRAM(s) Oral daily  NIFEdipine XL 60 milliGRAM(s) Oral daily  ondansetron Injectable 4 milliGRAM(s) IV Push three times a day PRN  pantoprazole    Tablet 40 milliGRAM(s) Oral before breakfast  polyethylene glycol 3350 17 Gram(s) Oral daily  senna 2 Tablet(s) Oral at bedtime  sodium chloride 0.9% lock flush 10 milliLiter(s) IV Push every 1 hour PRN  tamsulosin 0.4 milliGRAM(s) Oral at bedtime                            10.0   7.73  )-----------( 332      ( 05 Oct 2021 06:43 )             30.3       Hemoglobin: 10.0 g/dL (10-05 @ 06:43)  Hemoglobin: 8.9 g/dL (10-04 @ 06:10)  Hemoglobin: 8.7 g/dL (10-03 @ 05:58)  Hemoglobin: 8.7 g/dL (10-02 @ 07:02)  Hemoglobin: 9.1 g/dL (10-01 @ 06:35)      10-05    143  |  105  |  27<H>  ----------------------------<  111<H>  3.8   |  24  |  2.17<H>    Ca    8.8      05 Oct 2021 06:43  Phos  4.0     10-05  Mg     2.4     10-05    TPro  7.2  /  Alb  2.5<L>  /  TBili  0.5  /  DBili  x   /  AST  28  /  ALT  18  /  AlkPhos  108  10-05    Creatinine Trend: 2.17<--, 2.13<--, 1.70<--, 1.49<--, 1.48<--, 1.66<--    COAGS:           T(C): 36.4 (10-05-21 @ 05:15), Max: 36.4 (10-04-21 @ 14:00)  HR: 75 (10-05-21 @ 05:15) (71 - 75)  BP: 123/55 (10-05-21 @ 05:15) (112/50 - 123/55)  RR: 17 (10-05-21 @ 05:15) (17 - 20)  SpO2: 93% (10-05-21 @ 05:15) (90% - 95%)  Wt(kg): --    I&O's Summary    04 Oct 2021 07:01  -  05 Oct 2021 07:00  --------------------------------------------------------  IN: 0 mL / OUT: 900 mL / NET: -900 mL      HEENT:  (-)icterus (-)pallor  CV: N S1 S2 1/6 TRINIDAD (+)2 Pulses B/l  Resp:  Clear to ausculatation B/L, normal effort  GI: (+) BS Soft, NT, ND  Lymph:  (-)Edema, (-)obvious lymphadenopathy  Skin: Warm to touch, Normal turgor  Psych: Appropriate mood and affect      ASSESSMENT/PLAN: 	76y  Male PMH DM on insulin, HTN, HLD, normal lV fx moderate to severe AS 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    - monitor crt, nephrology f/u appreciated   - Podiatry f/u noted  - Abx per primary team  - Positive trop noted, likely due to increased demand and renal failure.  - He will need a SABIHA and R+L heart cath once he is medically optimized and infection treated  - Cont medical management of CAD  - oupt cardiac f/u [phone]      Zak Wilkins MD, [facility]  [place] [phone]

## 2021-10-05 NOTE — PROGRESS NOTE ADULT - SUBJECTIVE AND OBJECTIVE BOX
Patient is a 76y old  Male who presents with a chief complaint of R Foot Pain (05 Oct 2021 15:51)    PATIENT IS SEEN AND EXAMINED IN MEDICAL FLOOR.  NGT [    ]    MADDY [   ]      GT [   ]    ALLERGIES:  No Known Allergies      Daily     Daily     VITALS:    Vital Signs Last 24 Hrs  T(C): 36.2 (05 Oct 2021 12:18), Max: 36.4 (05 Oct 2021 05:15)  T(F): 97.2 (05 Oct 2021 12:18), Max: 97.5 (05 Oct 2021 05:15)  HR: 66 (05 Oct 2021 12:39) (66 - 75)  BP: 116/55 (05 Oct 2021 12:18) (116/55 - 123/55)  BP(mean): --  RR: 20 (05 Oct 2021 12:18) (17 - 20)  SpO2: 95% (05 Oct 2021 12:39) (90% - 95%)    LABS:    CBC Full  -  ( 05 Oct 2021 06:43 )  WBC Count : 7.73 K/uL  RBC Count : 3.36 M/uL  Hemoglobin : 10.0 g/dL  Hematocrit : 30.3 %  Platelet Count - Automated : 332 K/uL  Mean Cell Volume : 90.2 fl  Mean Cell Hemoglobin : 29.8 pg  Mean Cell Hemoglobin Concentration : 33.0 gm/dL  Auto Neutrophil # : x  Auto Lymphocyte # : x  Auto Monocyte # : x  Auto Eosinophil # : x  Auto Basophil # : x  Auto Neutrophil % : x  Auto Lymphocyte % : x  Auto Monocyte % : x  Auto Eosinophil % : x  Auto Basophil % : x      10-05    143  |  105  |  27<H>  ----------------------------<  111<H>  3.8   |  24  |  2.17<H>    Ca    8.8      05 Oct 2021 06:43  Phos  4.0     10-05  Mg     2.4     10-05    TPro  7.2  /  Alb  2.5<L>  /  TBili  0.5  /  DBili  x   /  AST  28  /  ALT  18  /  AlkPhos  108  10-05    CAPILLARY BLOOD GLUCOSE      POCT Blood Glucose.: 114 mg/dL (05 Oct 2021 11:43)  POCT Blood Glucose.: 111 mg/dL (05 Oct 2021 07:41)  POCT Blood Glucose.: 129 mg/dL (04 Oct 2021 21:14)  POCT Blood Glucose.: 119 mg/dL (04 Oct 2021 16:55)        LIVER FUNCTIONS - ( 05 Oct 2021 06:43 )  Alb: 2.5 g/dL / Pro: 7.2 g/dL / ALK PHOS: 108 U/L / ALT: 18 U/L DA / AST: 28 U/L / GGT: x           Creatinine Trend: 2.17<--, 2.13<--, 1.70<--, 1.49<--, 1.48<--, 1.66<--  I&O's Summary    04 Oct 2021 07:01  -  05 Oct 2021 07:00  --------------------------------------------------------  IN: 0 mL / OUT: 900 mL / NET: -900 mL    05 Oct 2021 07:01  -  05 Oct 2021 16:53  --------------------------------------------------------  IN: 0 mL / OUT: 150 mL / NET: -150 mL            .Tissue Right 5th toe r/o osteomyeltis  09-22 @ 13:54   No growth at 5 days  --    No polymorphonuclear cells seen per low power field  No organisms seen per oil power field      .Blood Blood-Venous  09-17 @ 10:28   No Growth Final  --  --      .Surgical Swab right foot wound  09-16 @ 21:42   Culture yields >4 types of aerobic and/or anaerobic bacteria  Call client services within 7 days if further workup is clinically  indicated. Culture includes  Rare Aeromonas hydrophila/caviae  Few Klebsiella oxytoca/Raoutella ornithinolytica  Few Enterococcus faecalis  Few Streptococcus agalactiae (Group B) isolated  Group B streptococci are susceptible to ampicillin,  penicillin and cefazolin, but may be resistant to  erythromycin and clindamycin.  Recommendations for intrapartum prophylaxis for Group B  streptococci are penicillin or ampicillin.  --  Aeromonas hydrophila/caviae  Klebsiella oxytoca /Raoutella ornithinolytica  Enterococcus faecalis      Bone R 5th metatarsal bone clean ma  08-20 @ 03:59   No growth at 5 days  --    No polymorphonuclear leukocytes seen per low power field  No organisms seen per oil power field      .Blood Blood-Venous  08-14 @ 21:09   No Growth Final  --  --      .Surgical Swab Right foot plantar wound  08-14 @ 21:08   Moderate Streptococcus agalactiae (Group B) isolated  Group B streptococci are susceptible to ampicillin,  penicillin and cefazolin, but may be resistant to  erythromycin and clindamycin.  Recommendations for intrapartum prophylaxis for Group B  streptococci are penicillin or ampicillin.  Moderate Bacteroides fragilis "Susceptibilities not performed"  Normal skin filiberto isolated  --  --          MEDICATIONS:    MEDICATIONS  (STANDING):  albumin human 25% IVPB 50 milliLiter(s) IV Intermittent every 4 hours  ampicillin/sulbactam  IVPB 3 Gram(s) IV Intermittent every 6 hours  aspirin  chewable 81 milliGRAM(s) Oral daily  atorvastatin 80 milliGRAM(s) Oral at bedtime  bisacodyl Suppository 10 milliGRAM(s) Rectal once  chlorhexidine 2% Cloths 1 Application(s) Topical <User Schedule>  cyanocobalamin 1000 MICROGram(s) Oral daily  ergocalciferol 57173 Unit(s) Oral <User Schedule>  ferrous    sulfate 325 milliGRAM(s) Oral daily  finasteride 5 milliGRAM(s) Oral daily  fluconAZOLE IVPB      fluconAZOLE IVPB 100 milliGRAM(s) IV Intermittent every 24 hours  furosemide   Injectable 40 milliGRAM(s) IV Push every 6 hours  heparin   Injectable 5000 Unit(s) SubCutaneous every 8 hours  influenza   Vaccine 0.5 milliLiter(s) IntraMuscular once  insulin lispro (ADMELOG) corrective regimen sliding scale   SubCutaneous Before meals and at bedtime  levoFLOXacin  Tablet 250 milliGRAM(s) Oral every 24 hours  metoprolol succinate  milliGRAM(s) Oral daily  mirtazapine 7.5 milliGRAM(s) Oral daily  NIFEdipine XL 60 milliGRAM(s) Oral daily  pantoprazole    Tablet 40 milliGRAM(s) Oral before breakfast  polyethylene glycol 3350 17 Gram(s) Oral daily  senna 2 Tablet(s) Oral at bedtime  tamsulosin 0.4 milliGRAM(s) Oral at bedtime      MEDICATIONS  (PRN):  ondansetron Injectable 4 milliGRAM(s) IV Push three times a day PRN Nausea and/or Vomiting  sodium chloride 0.9% lock flush 10 milliLiter(s) IV Push every 1 hour PRN Pre/post blood products, medications, blood draw, and to maintain line patency      REVIEW OF SYSTEMS:                           ALL ROS DONE [ X   ]    CONSTITUTIONAL:  LETHARGIC [   ], FEVER [   ], UNRESPONSIVE [   ]  CVS:  CP  [   ], SOB, [   ], PALPITATIONS [   ], DIZZYNESS [   ]  RS: COUGH [   ], SPUTUM [   ]  GI: ABDOMINAL PAIN [   ], NAUSEA [   ], VOMITINGS [   ], DIARRHEA [   ], CONSTIPATION [   ]  :  DYSURIA [   ], NOCTURIA [   ], INCREASED FREQUENCY [   ], DRIBLING [   ],  SKELETAL: PAINFUL JOINTS [   ], SWOLLEN JOINTS [   ], NECK ACHE [   ], LOW BACK ACHE [   ],  SKIN : ULCERS [   ], RASH [   ], ITCHING [   ]  CNS: HEAD ACHE [   ], DOUBLE VISION [   ], BLURRED VISION [   ], AMS / CONFUSION [   ], SEIZURES [   ], WEAKNESS [   ],TINGLING / NUMBNESS [   ]      PHYSICAL EXAMINATION:  GENERAL APPEARANCE: NO DISTRESS  HEENT:  NO PALLOR, NO  JVD,  NO   NODES, NECK SUPPLE  CVS: S1 +, S2 +,   RS: AEEB,  OCCASIONAL  RALES +,   NO RONCHI, CRACKLES +  ABD: SOFT, NT, NO, BS +       EXT: NO PE  SKIN: WARM,   RIGHT FOOT WRAPPED W/ WOUND VAC IN PLACE  SKELETAL:  ROM ACCEPTABLE  CNS:  AAO X  2     RADIOLOGY :    EXAM:  CT ABDOMEN AND PELVIS OC                            PROCEDURE DATE:  09/27/2021          INTERPRETATION:  CLINICAL INFORMATION: Small bowel obstruction.    COMPARISON: None.    CONTRAST/COMPLICATIONS:  IV Contrast: NONE  Oral Contrast: Omnipaque 300  Complications: None reported at time of study completion    PROCEDURE:  CT of the Abdomen and Pelvis was performed.  Sagittal and coronal reformats were performed.    FINDINGS:  LOWER CHEST: Small bilateral pleural effusions with compressiveatelectasis of both lower lobes.    LIVER: Within normal limits.  BILE DUCTS: Normal caliber.  GALLBLADDER: Distended. Small layering gallstones.  SPLEEN: Within normal limits.  PANCREAS: Within normal limits.  ADRENALS: Within normal limits.  KIDNEYS/URETERS: No hydronephrosis. Nonobstructing left upper pole calculus, measuring 0.6 cm.    BLADDER: Urinary bladder contains air and a Castañeda catheter balloon.  REPRODUCTIVE ORGANS: Prostate is not enlarged.    BOWEL: No bowel obstruction. Appendix isnormal. Colonic diverticulosis.  PERITONEUM: Mild presacral fluid.  VESSELS: Atherosclerotic changes.  RETROPERITONEUM/LYMPH NODES: No lymphadenopathy.  ABDOMINAL WALL: Within normal limits.  BONES: Degenerative changes. Sternotomy.    IMPRESSION:  No acute intra-abdominal pathology.    Small bilateral pleural effusions with compressive atelectasis of both lower lobes.    ====================================================    EXAM:  MR FOOT RT                            PROCEDURE DATE:  09/17/2021          INTERPRETATION:  Clinical Information: Recent fifth metatarsal head resection now with fifth digit pain, swelling and foul discharge.    Comparison: Radiographs of the right foot from 9/16/2021 and MRI the right foot from 8/16/2021.    Technique:  MRI of the right midfoot and forefoot.  Intravenous Contrast: None.    Findings:    There is a resection at the mid aspect of the fifth metatarsal shaft. There is a lateral soft tissue wound beginning at the level of the amputation which extends distally. There is susceptibility artifact in the region of the amputation consistent with postoperative change. There is hyperintense T2 marrow signal throughout the remaining fifth metatarsal and within the adjacent fourth metatarsal head which is nonspecific and could be related to recent postoperative changes although osteomyelitis is suspected.    There is edema and mild atrophy within the plantar muscles of the foot. There is minimal spurring at the first metatarsophalangeal and hallux sesamoid articulations.    Impression:  Resection at the mid aspect of the fifth metatarsal. Lateral soft tissue wound with marrow signal abnormality throughout the fifth metatarsal and within the adjacent fourth metatarsal head which is nonspecific in the setting of recent surgery although osteomyelitis is suspected.        ASSESSMENT :     Headache    HTN (hypertension)    HLD (hyperlipidemia)    DM (diabetes mellitus)    CAD (coronary artery disease)    Glaucoma, angle-closure    [place] (hard of hearing)    No significant past surgical history    S/P CABG (coronary artery bypass graft)    History of thyroid surgery        PLAN:  HPI:  76M from home, lives with daughter w/ PMH DM on insulin, HTN, HLD, CAD, 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 Pt with sharp pain on the R lateral foot. As per daughter, pt was taking tylenol which did not help his pain prompting the patient to ask his daughter to take him to the hospital. Pt is a poor historian. Daughter states that the patient did not see his podiatrist after the surgery. No fever, chest, pain, palpitations, nausea, vomiting, diarrhea (17 Sep 2021 01:11)    # PATIENT FOR D/C TO [facility] FOR [facility] - D/W CM TEAM 10/2, AWAITING AUTHORIZATION / ANNETTA PER CM TEAM.    # SUSPECT RIGHT FOOT OSTEOMYELITIS S/P RIGHT 5TH RAY RESECTION [8/19] AND S/P DEBRIDEMENT, GRAFT APPLICATION, BONE BX AND WOUND VAC APPLICATION 9/22  ** RECENT ADMISSION FOR RIGHT DIABETIC FOOT ULCER, CELLULITIS, OSTEOMYELITIS RIGHT 5th METATARSAL S/P RIGHT 5TH PARTIAL RAY AMPUTATION [8/20] - BONE PATHOLOGY W/ CLEAN MARGINS    - S/P VANCOMYCIN AND ZOSYN ; NOW ON UNASYN AND LEVAQUIN GIVEN OREN; PER ID SWITCH TO ZOSYN UNTIL 11/3  - RECENTLY HAD SIMON W/ MILD PAD  - REVIEWED WOUND CX [ ENTEROCOCCUS, AEROMONAS, KLEBSIELLA] AND BCX  - BONE BX - acute osteomyelitis and necrotic periosteal tissue.   - ID CONSULT, PODIATRY CONSULT    - PICC LINE PLACED TODAY TO COMPLETE 6 WEEKS OF ANTIBIOTICS - PER ID SWITCH TO ZOSYN UNTIL 11/3    # ACUTE HYPOXIC RESPIRATORY FAILURE SUSPECT S/T PULMONARY EDEMA IN THE SETTING OF SEVERE AORTIC STENOSIS + ASPIRATION PNA - ON LEVAQUIN, STARTED LASIX, CARDIOLOGY CONSULT IN PROGRESS  - S/P CRITICAL CARE CONSULT  - S/P LASIX ON 10/1 - 10/2, 10/4 AND 10/5  - CT CHEST W/ BILATERAL PLEURAL EFFUSIONS [10/5] - PULMONOLOGY CONSULT FOR POSSIBLE THORACENTESIS & WILL CONTINUE LASIX     - TODAY WITH WORSENING HYPOXIA - RECHECKED CXR AND GIVING LASIX    # FUNGURIA - ON FLUCONAZOLE    # BOWEL DISTENTION - ? ILEUS - OPTIMIZING ELECTROLYTES - IMPROVED  - SURGERY CONSULT IN PROGRESS  - PASSED 2 BMS ON 9/27    # ASPIRATION EVENT WHEN PATIENT REMOVED NGT - ON LEVAQUIN, ID CONSULT IN PROGRESS    # CHOLELITHIASIS - TRENDING LFTS, RUQ U/S - CHOLELITHIASIS, SURGERY CONSULT IN PROGRESS  - GI CONSULT IN PROGRESS - LESS SUSPICIOUS FOR CHOLECYSTITIS    # DYSPEPSIA - PLACED ON PPI BID WITH IMPROVEMENT, UNDERWENT ST EVAL     # ELEVATED TROPONINS - NSTEMI - ? DEMAND - WILL NEED ISCHEMIC EVAL ONCE ACUTE INFECTION RESOLVES PER CARDIOLOGY, CARDIOLOGY CONSULT IN PROGRESS  - ON ASA, STATIN, BB    # OREN ON CKD - S/T ATN AND URINARY RETENTION - S/P IVF, NOW W/ CASTAÑEDA, NEPHROLOGY CONUSLT IN PROGRESS  - ON FLOMAX, ADDED FINASTERIDE    - WITH WORSENING RENAL FXN - NOTED URINARY RETENTION [10/4] - REPLACED CASTAÑEDA    # MEDICAL CLEARANCE FOR SURGERY - RCRI - 2 - 10.1 % 30 DAY RISK OF DEATH, MI OR CARDIAC ARREST  - CARDIOLOGY CONSULT     # DM -  HBA1C - 11  - LANTUS, SSI + FS    # CAD S/P CABG - PLACED ON ASA, STATIN, BB  - ECHO - SEVERE AORTIC STENOSIS, SEVERE CONCENTRIC LVH, G1DD, MILD IL  - CARDIOLOGY CONSULT - DR. BASSETT   - MAY NEED ISCHEMIC EVAL ONCE ACUTE ILLNESS RESOLVES    # HTN - ON METOPROLOL, NICARDIPINE    # SEVERE, ASYMPTOMATIC, STENOSIS - ONCE ACUTE ILLNESS RESOLVES, WILL LIKELY NEED ISCHEMIC WORKUP, SABIHA TO EVALUATE FURTHER. D/W PATIENT, DAUGHTER AND CARDIOLOGY TEAM [PREVIOUSLY SAW DR. BASSETT]    # VITAMIN D DEFICIENCY - ON CHOLECALCIFEROL    # HLD - STATIN    # CASE DISCUSSED AT LENGTH WITH PATIENT AND DAUGHTER, BIRDIESIMIN ROBERT AT BEDSIDE AND VIA PHONE @ [phone] [10/1]  # PATIENT AND DAUGHTER REFUSED [facility] ON PREVIOUS ADMISSION, PREVIOUSLY WISHED FOR PATIENT RETURN HOME W/ HOME PT; HOWEVER NOW, DAUGHTER WISHES FOR PAT[facility] AS PATIENT'S WIFE IS CURRENTLY ADMITTED THERE    # GI AND DVT PPX

## 2021-10-05 NOTE — PROGRESS NOTE ADULT - SUBJECTIVE AND OBJECTIVE BOX
[facility] NEPHROLOGY- PROGRESS NOTE    Patient is a 75yo Male with DM on insulin, HTN, HLD, CAD, s/p R partial 5th ray amputation 8/19/2021 p/w R foot pain and foul drainage a/w diabetic foot ulcer suspicious for osteomyelitis. s/p OR debridement today. Nephrology consulted for abrupt rise in SCr.   s/p ibrahim placement for distended bladder on 9/23 with ~400ml of urine o/p  9/27- pt with SOB; CXR with pulmonary edema- pt given Lasix 80mg IV x1. Concern for aspiration PNA  Course BP: s/p lasix 60mg IV on 10/1 & 10/2 and s/p Lasix 40mg IV x1 today 10/4. Bladder scan with 1L urine; s/p ibrahim reinserted    Hospital Medications: Medications reviewed.  REVIEW OF SYSTEMS:  CONSTITUTIONAL: No fevers or chills  RESPIRATORY: no shortness of breath +cough dry  CARDIOVASCULAR: No chest pain.  GASTROINTESTINAL: No nausea or vomiting, or diarrhea or abdominal pain    VASCULAR: No bilateral lower extremity edema. Rt foot - denies pain    VITALS:  T(F): 97.5 (10-05-21 @ 05:15), Max: 97.6 (10-04-21 @ 14:00)  HR: 75 (10-05-21 @ 05:15)  BP: 123/55 (10-05-21 @ 05:15)  RR: 17 (10-05-21 @ 05:15)  SpO2: 93% (10-05-21 @ 05:15)  Wt(kg): --    10-04 @ 07:01  -  10-05 @ 07:00  --------------------------------------------------------  IN: 0 mL / OUT: 900 mL / NET: -900 mL      PHYSICAL EXAM:  Gen: NAD, calm  HEENT: MMM  Neck: no JVD  Cards: RRR, +S1/S2, +TRINIDAD  Resp:  coarse BS b/l  GI: soft, NT  : +ibrahim  Extremities: no LE edema B/L  Derm: Rt foot wrapped    LABS:  10-05    143  |  105  |  27<H>  ----------------------------<  111<H>  3.8   |  24  |  2.17<H>    Ca    8.8      05 Oct 2021 06:43  Phos  4.0     10-05  Mg     2.4     10-05    TPro  7.2  /  Alb  2.5<L>  /  TBili  0.5  /  DBili      /  AST  28  /  ALT  18  /  AlkPhos  108  10-05    Creatinine Trend: 2.17 <--, 2.13 <--, 1.70 <--, 1.49 <--, 1.48 <--, 1.66 <--, 1.73 <--                        10.0   7.73  )-----------( 332      ( 05 Oct 2021 06:43 )             30.3     Urine Studies:

## 2021-10-05 NOTE — PROGRESS NOTE ADULT - PROBLEM SELECTOR PLAN 9
PT rec [facility] - pending wound vac delivery to facility  Will need IV antibiotics thru 11/3 ( zosyn and fluconazole)   PICC placed 10/1  f/u CT chest Resolved.  CT abdomen as above  Continue to monitor.

## 2021-10-05 NOTE — CONSULT NOTE ADULT - SUBJECTIVE AND OBJECTIVE BOX
Thoracic Surgery  Consultation Note    Patient is a 76y old  Male who presents with a chief complaint of R Foot Pain (05 Oct 2021 19:01)      HPI:  76M from home, lives with daughter w/ PMH DM on insulin, HTN, HLD, CAD, 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 Pt with sharp pain on the R lateral foot. As per daughter, pt was taking tylenol which did not help his pain prompting the patient to ask his daughter to take him to the hospital. Pt is a poor historian. Daughter states that the patient did not see his podiatrist after the surgery. No fever, chest, pain, palpitations, nausea, vomiting, diarrhea (17 Sep 2021 01:11)    INTERVAL HPI:  77 yo M admitted for R foot pain, R osteomyelitis with acute respiratory failure secondary to pulmonary edema found to have b/l pleural effusions R>L. Pt reportedly with hypoxia while on oxygen pendent. Pt was transferred to ICU for further monitoring.  Pt c/o neck pain but denies chest pain or SOB.        PAST MEDICAL & SURGICAL HISTORY:  HTN (hypertension)    HLD (hyperlipidemia)    DM (diabetes mellitus)    CAD (coronary artery disease)    Glaucoma, angle-closure    [place] (hard of hearing)    S/P CABG (coronary artery bypass graft)    History of thyroid surgery        Allergies    No Known Allergies    Intolerances        MEDICATIONS  (STANDING):  albumin human 25% IVPB 50 milliLiter(s) IV Intermittent every 6 hours  ampicillin/sulbactam  IVPB 3 Gram(s) IV Intermittent every 6 hours  aspirin  chewable 81 milliGRAM(s) Oral daily  atorvastatin 80 milliGRAM(s) Oral at bedtime  chlorhexidine 2% Cloths 1 Application(s) Topical <User Schedule>  cyanocobalamin 1000 MICROGram(s) Oral daily  ergocalciferol 02954 Unit(s) Oral <User Schedule>  ferrous    sulfate 325 milliGRAM(s) Oral daily  finasteride 5 milliGRAM(s) Oral daily  fluconAZOLE IVPB      fluconAZOLE IVPB 100 milliGRAM(s) IV Intermittent every 24 hours  furosemide   Injectable 40 milliGRAM(s) IV Push every 6 hours  heparin   Injectable 5000 Unit(s) SubCutaneous every 8 hours  insulin lispro (ADMELOG) corrective regimen sliding scale   SubCutaneous Before meals and at bedtime  levoFLOXacin  Tablet 250 milliGRAM(s) Oral every 24 hours  metoprolol succinate  milliGRAM(s) Oral daily  NIFEdipine XL 60 milliGRAM(s) Oral daily  pantoprazole    Tablet 40 milliGRAM(s) Oral before breakfast  polyethylene glycol 3350 17 Gram(s) Oral daily  senna 2 Tablet(s) Oral at bedtime  tamsulosin 0.4 milliGRAM(s) Oral at bedtime    MEDICATIONS  (PRN):  ondansetron Injectable 4 milliGRAM(s) IV Push three times a day PRN Nausea and/or Vomiting  sodium chloride 0.9% lock flush 10 milliLiter(s) IV Push every 1 hour PRN Pre/post blood products, medications, blood draw, and to maintain line patency      Vital Signs Last 24 Hrs  T(C): 36.2 (05 Oct 2021 21:45), Max: 36.5 (05 Oct 2021 20:52)  T(F): 97.1 (05 Oct 2021 21:45), Max: 97.7 (05 Oct 2021 20:52)  HR: 81 (05 Oct 2021 21:45) (66 - 81)  BP: 118/58 (05 Oct 2021 20:52) (116/55 - 123/55)  BP(mean): --  RR: 32 (05 Oct 2021 21:45) (17 - 32)  SpO2: 90% (05 Oct 2021 20:52) (85% - 95%)    Physical Exam:  Gen: awake, alert oriented  HEENT: anicteric, on oxygen pendent  Chest: equal chest rise, no accessory muscle use  Abd: obese, soft NT ND    Labs:                          8.8    6.55  )-----------( 298      ( 05 Oct 2021 22:32 )             27.4     10-05    143  |  104  |  26<H>  ----------------------------<  103<H>  3.4<L>   |  25  |  2.11<H>    Ca    8.4      05 Oct 2021 22:32  Phos  4.0     10-05  Mg     2.2     10-05    TPro  6.9  /  Alb  2.6<L>  /  TBili  0.5  /  DBili  x   /  AST  27  /  ALT  16  /  AlkPhos  99  10-05          Radiological Exams:  < from: CT Chest No Cont (10.05.21 @ 14:07) >  IMPRESSION:  Pulmonary edema with bilateral moderate to large pleural effusions. Underlying bilateral lower lobe compressive atelectasis versus pneumonia.    Mildly enlarged mediastinal lymph nodes, possibly reactive.      < end of copied text >

## 2021-10-05 NOTE — CONSULT NOTE ADULT - SUBJECTIVE AND OBJECTIVE BOX
Time of visit:    CHIEF COMPLAINT: Patient is a 76y old  Male who presents with a chief complaint of R Foot Pain (05 Oct 2021 12:38)      HPI:  76M from home, lives with daughter w/ PMH DM on insulin, HTN, HLD, CAD, 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 Pt with sharp pain on the R lateral foot. As per daughter, pt was taking tylenol which did not help his pain prompting the patient to ask his daughter to take him to the hospital. Pt is a poor historian. Daughter states that the patient did not see his podiatrist after the surgery. No fever, chest, pain, palpitations, nausea, vomiting, diarrhea (17 Sep 2021 01:11)   Patient seen and examined.     PAST MEDICAL & SURGICAL HISTORY:  HTN (hypertension)    HLD (hyperlipidemia)    DM (diabetes mellitus)    CAD (coronary artery disease)    Glaucoma, angle-closure    [place] (hard of hearing)    S/P CABG (coronary artery bypass graft)    History of thyroid surgery        Allergies    No Known Allergies    Intolerances        MEDICATIONS  (STANDING):  ampicillin/sulbactam  IVPB 3 Gram(s) IV Intermittent every 6 hours  aspirin  chewable 81 milliGRAM(s) Oral daily  atorvastatin 80 milliGRAM(s) Oral at bedtime  bisacodyl Suppository 10 milliGRAM(s) Rectal once  chlorhexidine 2% Cloths 1 Application(s) Topical <User Schedule>  cyanocobalamin 1000 MICROGram(s) Oral daily  ergocalciferol 74501 Unit(s) Oral <User Schedule>  ferrous    sulfate 325 milliGRAM(s) Oral daily  finasteride 5 milliGRAM(s) Oral daily  fluconAZOLE IVPB      fluconAZOLE IVPB 100 milliGRAM(s) IV Intermittent every 24 hours  heparin   Injectable 5000 Unit(s) SubCutaneous every 8 hours  influenza   Vaccine 0.5 milliLiter(s) IntraMuscular once  insulin lispro (ADMELOG) corrective regimen sliding scale   SubCutaneous Before meals and at bedtime  levoFLOXacin  Tablet 250 milliGRAM(s) Oral every 24 hours  metoprolol succinate  milliGRAM(s) Oral daily  mirtazapine 7.5 milliGRAM(s) Oral daily  NIFEdipine XL 60 milliGRAM(s) Oral daily  pantoprazole    Tablet 40 milliGRAM(s) Oral before breakfast  polyethylene glycol 3350 17 Gram(s) Oral daily  senna 2 Tablet(s) Oral at bedtime  tamsulosin 0.4 milliGRAM(s) Oral at bedtime      MEDICATIONS  (PRN):  ondansetron Injectable 4 milliGRAM(s) IV Push three times a day PRN Nausea and/or Vomiting  sodium chloride 0.9% lock flush 10 milliLiter(s) IV Push every 1 hour PRN Pre/post blood products, medications, blood draw, and to maintain line patency   Medications up to date at time of exam.    Medications up to date at time of exam.    FAMILY HISTORY:  Family history of acute myocardial infarction (Father)    Family history of diabetes mellitus (Mother, Sibling)    Family history of hypertension (Mother, Sibling)    Family history of hyperlipidemia (Mother, Sibling)        SOCIAL HISTORY  Smoking History: [   ] smoking/smoke exposure, [   ] former smoker  Living Condition: [   ] apartment, [   ] private house  Work History:   Travel History: denies recent travel  Illicit Substance Use: denies  Alcohol Use: denies    REVIEW OF SYSTEMS:    CONSTITUTIONAL:  denies fevers, chills, sweats, weight loss    HEENT:  denies diplopia or blurred vision, sore throat or runny nose.    CARDIOVASCULAR:  denies pressure, squeezing, tightness, or heaviness about the chest; no palpitations.    RESPIRATORY:  denies SOB, cough, PARKER, wheezing.    GASTROINTESTINAL:  denies abdominal pain, nausea, vomiting or diarrhea.    GENITOURINARY: denies dysuria, frequency or urgency.    NEUROLOGIC:  denies numbness, tingling, seizures or weakness.    PSYCHIATRIC:  denies disorder of thought or mood.    MSK: denies swelling, redness      PHYSICAL EXAMINATION:    GENERAL: The patient is a well-developed, well-nourished, in no apparent distress.     Vital Signs Last 24 Hrs  T(C): 36.2 (05 Oct 2021 12:18), Max: 36.4 (05 Oct 2021 05:15)  T(F): 97.2 (05 Oct 2021 12:18), Max: 97.5 (05 Oct 2021 05:15)  HR: 66 (05 Oct 2021 12:39) (66 - 75)  BP: 116/55 (05 Oct 2021 12:18) (116/55 - 123/55)  BP(mean): --  RR: 20 (05 Oct 2021 12:18) (17 - 20)  SpO2: 95% (05 Oct 2021 12:39) (90% - 95%)   (if applicable)    Chest Tube (if applicable)    HEENT: Head is normocephalic and atraumatic. Extraocular muscles are intact. Mucous membranes are moist.     NECK: Supple, no palpable adenopathy.    LUNGS: Clear to auscultation, no wheezing, rales, or rhonchi.    HEART: Regular rate and rhythm without murmur.    ABDOMEN: Soft, nontender, and nondistended.  No hepatosplenomegaly is noted.    RENAL: No difficulty voiding, no pelvic pain    EXTREMITIES: Without any cyanosis, clubbing, rash, lesions or edema.    NEUROLOGIC: Awake, alert, oriented, grossly intact    SKIN: Warm, dry, good turgor.      LABS:                        10.0   7.73  )-----------( 332      ( 05 Oct 2021 06:43 )             30.3     10-05    143  |  105  |  27<H>  ----------------------------<  111<H>  3.8   |  24  |  2.17<H>    Ca    8.8      05 Oct 2021 06:43  Phos  4.0     10-05  Mg     2.4     10-05    TPro  7.2  /  Alb  2.5<L>  /  TBili  0.5  /  DBili  x   /  AST  28  /  ALT  18  /  AlkPhos  108  10-05                        MICROBIOLOGY: (if applicable)    RADIOLOGY & ADDITIONAL STUDIES:  EKG:   CXR:  ECHO:    IMPRESSION: 76y Male PAST MEDICAL & SURGICAL HISTORY:  HTN (hypertension)    HLD (hyperlipidemia)    DM (diabetes mellitus)    CAD (coronary artery disease)    Glaucoma, angle-closure    [place] (hard of hearing)    S/P CABG (coronary artery bypass graft)    History of thyroid surgery     p/w                   RECOMMENDATIONS:   Time of visit:    CHIEF COMPLAINT: Patient is a 76y old  Male who presents with a chief complaint of R Foot Pain (05 Oct 2021 12:38)      HPI:  76M from home, lives with daughter w/ PMH DM on insulin, HTN, HLD, CAD, 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 Pt with sharp pain on the R lateral foot. As per daughter, pt was taking tylenol which did not help his pain prompting the patient to ask his daughter to take him to the hospital. Pt is a poor historian. Daughter states that the patient did not see his podiatrist after the surgery. No fever, chest, pain, palpitations, nausea, vomiting, diarrhea (17 Sep 2021 01:11)   Patient seen and examined.     PAST MEDICAL & SURGICAL HISTORY:  HTN (hypertension)    HLD (hyperlipidemia)    DM (diabetes mellitus)    CAD (coronary artery disease)    Glaucoma, angle-closure    [place] (hard of hearing)    S/P CABG (coronary artery bypass graft)    History of thyroid surgery        Allergies    No Known Allergies    Intolerances        MEDICATIONS  (STANDING):  ampicillin/sulbactam  IVPB 3 Gram(s) IV Intermittent every 6 hours  aspirin  chewable 81 milliGRAM(s) Oral daily  atorvastatin 80 milliGRAM(s) Oral at bedtime  bisacodyl Suppository 10 milliGRAM(s) Rectal once  chlorhexidine 2% Cloths 1 Application(s) Topical <User Schedule>  cyanocobalamin 1000 MICROGram(s) Oral daily  ergocalciferol 99159 Unit(s) Oral <User Schedule>  ferrous    sulfate 325 milliGRAM(s) Oral daily  finasteride 5 milliGRAM(s) Oral daily  fluconAZOLE IVPB      fluconAZOLE IVPB 100 milliGRAM(s) IV Intermittent every 24 hours  heparin   Injectable 5000 Unit(s) SubCutaneous every 8 hours  influenza   Vaccine 0.5 milliLiter(s) IntraMuscular once  insulin lispro (ADMELOG) corrective regimen sliding scale   SubCutaneous Before meals and at bedtime  levoFLOXacin  Tablet 250 milliGRAM(s) Oral every 24 hours  metoprolol succinate  milliGRAM(s) Oral daily  mirtazapine 7.5 milliGRAM(s) Oral daily  NIFEdipine XL 60 milliGRAM(s) Oral daily  pantoprazole    Tablet 40 milliGRAM(s) Oral before breakfast  polyethylene glycol 3350 17 Gram(s) Oral daily  senna 2 Tablet(s) Oral at bedtime  tamsulosin 0.4 milliGRAM(s) Oral at bedtime      MEDICATIONS  (PRN):  ondansetron Injectable 4 milliGRAM(s) IV Push three times a day PRN Nausea and/or Vomiting  sodium chloride 0.9% lock flush 10 milliLiter(s) IV Push every 1 hour PRN Pre/post blood products, medications, blood draw, and to maintain line patency   Medications up to date at time of exam.    Medications up to date at time of exam.    FAMILY HISTORY:  Family history of acute myocardial infarction (Father)    Family history of diabetes mellitus (Mother, Sibling)    Family history of hypertension (Mother, Sibling)    Family history of hyperlipidemia (Mother, Sibling)        SOCIAL HISTORY  Smoking History: [   ] smoking/smoke exposure, [   ] former smoker  Living Condition: [   ] apartment, [   ] private house  Work History:   Travel History: denies recent travel  Illicit Substance Use: denies  Alcohol Use: denies    REVIEW OF SYSTEMS:    CONSTITUTIONAL:  denies fevers, chills, sweats, weight loss    HEENT:  denies diplopia or blurred vision, sore throat or runny nose.    CARDIOVASCULAR:  denies pressure, squeezing, tightness, or heaviness about the chest; no palpitations.    RESPIRATORY:  denies SOB, cough, PARKER, wheezing.    GASTROINTESTINAL:  denies abdominal pain, nausea, vomiting or diarrhea.    GENITOURINARY: denies dysuria, frequency or urgency.    NEUROLOGIC:  denies numbness, tingling, seizures or weakness.    PSYCHIATRIC:  denies disorder of thought or mood.    MSK: denies swelling, redness      PHYSICAL EXAMINATION:    GENERAL: The patient is a well-developed, well-nourished, in no apparent distress.     Vital Signs Last 24 Hrs  T(C): 36.2 (05 Oct 2021 12:18), Max: 36.4 (05 Oct 2021 05:15)  T(F): 97.2 (05 Oct 2021 12:18), Max: 97.5 (05 Oct 2021 05:15)  HR: 66 (05 Oct 2021 12:39) (66 - 75)  BP: 116/55 (05 Oct 2021 12:18) (116/55 - 123/55)  BP(mean): --  RR: 20 (05 Oct 2021 12:18) (17 - 20)  SpO2: 95% (05 Oct 2021 12:39) (90% - 95%)   (if applicable)    Chest Tube (if applicable)    HEENT: Head is normocephalic and atraumatic. Extraocular muscles are intact. Mucous membranes are moist.     NECK: Supple, no palpable adenopathy.    LUNGS: Clear to auscultation, no wheezing, rales, or rhonchi.    HEART: Regular rate and rhythm without murmur.    ABDOMEN: Soft, nontender, and nondistended.  No hepatosplenomegaly is noted.    RENAL: No difficulty voiding, no pelvic pain    EXTREMITIES: Without any cyanosis, clubbing, rash, lesions or edema.    NEUROLOGIC: Awake, alert, oriented, grossly intact    SKIN: Warm, dry, good turgor.      LABS:                        10.0   7.73  )-----------( 332      ( 05 Oct 2021 06:43 )             30.3     10-05    143  |  105  |  27<H>  ----------------------------<  111<H>  3.8   |  24  |  2.17<H>    Ca    8.8      05 Oct 2021 06:43  Phos  4.0     10-05  Mg     2.4     10-05    TPro  7.2  /  Alb  2.5<L>  /  TBili  0.5  /  DBili  x   /  AST  28  /  ALT  18  /  AlkPhos  108  10-05                        MICROBIOLOGY: (if applicable)    RADIOLOGY & ADDITIONAL STUDIES:  EKG:   CXR:  ECHO:    IMPRESSION: 76y Male PAST MEDICAL & SURGICAL HISTORY:  HTN (hypertension)    HLD (hyperlipidemia)    DM (diabetes mellitus)    CAD (coronary artery disease)    Glaucoma, angle-closure    [place] (hard of hearing)    S/P CABG (coronary artery bypass graft)    History of thyroid surgery     p/w         IMP: This is a  76 yr old man from home, lives with daughter with DM- uncontrol  on insulin, HTN, HLD, CAD, PSH R partial 5th ray amputation 8/19/2021 p/w R foot pain x1 day associated with foul smelling discharge due to osteomyelitis beig treated with iv antibx .  Pulmonary evaluation requested for hypoxia  with pleural effusion         Assessment:  - Acute Hypoxic Respiratory Failure  - Pulmonary Edema  - Pleural Effusion   - Possible  Aspiration PNA   - Osteomyelitis of right foot   - OREN superimposed on CKD  - Severe AS   - CAD s/p CABG   - Cholelithiasis   - HTN  - HLD  - DM uncontrol   - Diabetic foot ulcer      Plan   -pat is failing O2 supp via oxygen pendent   -will need BiPaP vs intubation   -possible chest tube placement   -trail of albumin and lasix   -continue antibx   -monitor blood sugar with coverage   -advance directives  -DVT/ GI prphy  -ICU eval   - i discussed care with icu attend     NP covering pat and medicine informed of my plan    Time of visit:    CHIEF COMPLAINT: Patient is a 76y old  Male who presents with a chief complaint of R Foot Pain (05 Oct 2021 12:38)      HPI:  76M from home, lives with daughter w/ PMH DM on insulin, HTN, HLD, CAD, 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 Pt with sharp pain on the R lateral foot. As per daughter, pt was taking tylenol which did not help his pain prompting the patient to ask his daughter to take him to the hospital. Pt is a poor historian. Daughter states that the patient did not see his podiatrist after the surgery. No fever, chest, pain, palpitations, nausea, vomiting, diarrhea (17 Sep 2021 01:11)   Patient seen and examined.     PAST MEDICAL & SURGICAL HISTORY:  HTN (hypertension)    HLD (hyperlipidemia)    DM (diabetes mellitus)    CAD (coronary artery disease)    Glaucoma, angle-closure    [place] (hard of hearing)    S/P CABG (coronary artery bypass graft)    History of thyroid surgery        Allergies    No Known Allergies    Intolerances        MEDICATIONS  (STANDING):  ampicillin/sulbactam  IVPB 3 Gram(s) IV Intermittent every 6 hours  aspirin  chewable 81 milliGRAM(s) Oral daily  atorvastatin 80 milliGRAM(s) Oral at bedtime  bisacodyl Suppository 10 milliGRAM(s) Rectal once  chlorhexidine 2% Cloths 1 Application(s) Topical <User Schedule>  cyanocobalamin 1000 MICROGram(s) Oral daily  ergocalciferol 38530 Unit(s) Oral <User Schedule>  ferrous    sulfate 325 milliGRAM(s) Oral daily  finasteride 5 milliGRAM(s) Oral daily  fluconAZOLE IVPB      fluconAZOLE IVPB 100 milliGRAM(s) IV Intermittent every 24 hours  heparin   Injectable 5000 Unit(s) SubCutaneous every 8 hours  influenza   Vaccine 0.5 milliLiter(s) IntraMuscular once  insulin lispro (ADMELOG) corrective regimen sliding scale   SubCutaneous Before meals and at bedtime  levoFLOXacin  Tablet 250 milliGRAM(s) Oral every 24 hours  metoprolol succinate  milliGRAM(s) Oral daily  mirtazapine 7.5 milliGRAM(s) Oral daily  NIFEdipine XL 60 milliGRAM(s) Oral daily  pantoprazole    Tablet 40 milliGRAM(s) Oral before breakfast  polyethylene glycol 3350 17 Gram(s) Oral daily  senna 2 Tablet(s) Oral at bedtime  tamsulosin 0.4 milliGRAM(s) Oral at bedtime      MEDICATIONS  (PRN):  ondansetron Injectable 4 milliGRAM(s) IV Push three times a day PRN Nausea and/or Vomiting  sodium chloride 0.9% lock flush 10 milliLiter(s) IV Push every 1 hour PRN Pre/post blood products, medications, blood draw, and to maintain line patency   Medications up to date at time of exam.    Medications up to date at time of exam.    FAMILY HISTORY:  Family history of acute myocardial infarction (Father)    Family history of diabetes mellitus (Mother, Sibling)    Family history of hypertension (Mother, Sibling)    Family history of hyperlipidemia (Mother, Sibling)        SOCIAL HISTORY  Smoking History: [   ] smoking/smoke exposure, [   ] former smoker  Living Condition: [   ] apartment, [   ] private house  Work History:   Travel History: denies recent travel  Illicit Substance Use: denies  Alcohol Use: denies    REVIEW OF SYSTEMS:    CONSTITUTIONAL:  denies fevers, chills, sweats, weight loss    HEENT:  denies diplopia or blurred vision, sore throat or runny nose.    CARDIOVASCULAR:  denies pressure, squeezing, tightness, or heaviness about the chest; no palpitations.    RESPIRATORY:  denies SOB, cough, PARKER, wheezing.    GASTROINTESTINAL:  denies abdominal pain, nausea, vomiting or diarrhea.    GENITOURINARY: denies dysuria, frequency or urgency.    NEUROLOGIC:  denies numbness, tingling, seizures or weakness.    PSYCHIATRIC:  denies disorder of thought or mood.    MSK: denies swelling, redness      PHYSICAL EXAMINATION:    GENERAL: The patient is a well-developed, well-nourished, in no apparent distress.     Vital Signs Last 24 Hrs  T(C): 36.2 (05 Oct 2021 12:18), Max: 36.4 (05 Oct 2021 05:15)  T(F): 97.2 (05 Oct 2021 12:18), Max: 97.5 (05 Oct 2021 05:15)  HR: 66 (05 Oct 2021 12:39) (66 - 75)  BP: 116/55 (05 Oct 2021 12:18) (116/55 - 123/55)  BP(mean): --  RR: 20 (05 Oct 2021 12:18) (17 - 20)  SpO2: 95% (05 Oct 2021 12:39) (90% - 95%)   (if applicable)    Chest Tube (if applicable)    HEENT: Head is normocephalic and atraumatic. Extraocular muscles are intact. Mucous membranes are moist.     NECK: Supple, no palpable adenopathy.    LUNGS: Clear to auscultation, no wheezing, rales, or rhonchi.    HEART: Regular rate and rhythm without murmur.    ABDOMEN: Soft, nontender, and nondistended.  No hepatosplenomegaly is noted.    RENAL: No difficulty voiding, no pelvic pain    EXTREMITIES: Without any cyanosis, clubbing, rash, lesions or edema.    NEUROLOGIC: Awake, alert, oriented, grossly intact    SKIN: Warm, dry, good turgor.      LABS:                        10.0   7.73  )-----------( 332      ( 05 Oct 2021 06:43 )             30.3     10-05    143  |  105  |  27<H>  ----------------------------<  111<H>  3.8   |  24  |  2.17<H>    Ca    8.8      05 Oct 2021 06:43  Phos  4.0     10-05  Mg     2.4     10-05    TPro  7.2  /  Alb  2.5<L>  /  TBili  0.5  /  DBili  x   /  AST  28  /  ALT  18  /  AlkPhos  108  10-05                        MICROBIOLOGY: (if applicable)    RADIOLOGY & ADDITIONAL STUDIES:  EKG:   CT chest :< from: CT Chest No Cont (10.05.21 @ 14:07) >    EXAM:  CT CHEST                            PROCEDURE DATE:  10/05/2021          INTERPRETATION:  CLINICAL INFORMATION: 76 years  Male with pul edema s/p Lasix.    COMPARISON: CT abdomen and pelvis 9/7/2021    CONTRAST/COMPLICATIONS:  IV Contrast: NONE  Oral Contrast: NONE  Complications: None reported at time of study completion    PROCEDURE:  CT of the Chest was performed.  Sagittal and coronal reformats were performed.    FINDINGS:    LUNGS AND AIRWAYS: Patent central airways.  Intralobular septal thickening and groundglass opacity secondary to pulmonary edema. Bilateral lower lobe compressive atelectasis.  PLEURA: Moderate to large bilateral pleural effusions.  MEDIASTINUM AND HERBERT: Enlarged right lower paratracheal lymph nodes measuringup to 1.4 cm short axis. Limited evaluation of the herbert without IV contrast.  VESSELS: Coronary artery calcifications versus stents. Mild aortic atherosclerosis. Left PICC line catheter tip at the cavoatrial junction.  HEART: Heart size is normal. CABG. Aortic root calcifications. No pericardial effusion.  CHEST WALL AND LOWER NECK: Median sternotomy. Bilateral gynecomastia. Left thyroid lobe not visualized.  VISUALIZED UPPER ABDOMEN: Mildly distended gallbladder with cholelithiasis.  BONES: Thoracic degenerative changes.    IMPRESSION:  Pulmonary edema with bilateral moderate to large pleural effusions. Underlying bilateral lower lobe compressive atelectasis versus pneumonia.    Mildly enlarged mediastinal lymph nodes, possibly reactive.        --- End of Report ---            MARK THOMPSON MD; Attending Radiologist  This document has been electronically signed. Oct  5 2021  2:34PM    < end of copied text >    ECHO:    IMPRESSION: 76y Male PAST MEDICAL & SURGICAL HISTORY:  HTN (hypertension)    HLD (hyperlipidemia)    DM (diabetes mellitus)    CAD (coronary artery disease)    Glaucoma, angle-closure    [place] (hard of hearing)    S/P CABG (coronary artery bypass graft)    History of thyroid surgery     p/w         IMP: This is a  76 yr old man from home, lives with daughter with DM- uncontrol  on insulin, HTN, HLD, CAD, PSH R partial 5th ray amputation 8/19/2021 p/w R foot pain x1 day associated with foul smelling discharge due to osteomyelitis beig treated with iv antibx .  Pulmonary evaluation requested for hypoxia  with pleural effusion         Assessment:  - Acute Hypoxic Respiratory Failure  - Pulmonary Edema  - Pleural Effusion   - Possible  Aspiration PNA   - Osteomyelitis of right foot   - OREN superimposed on CKD  - Severe AS   - CAD s/p CABG   - Cholelithiasis   - HTN  - HLD  - DM uncontrol   - Diabetic foot ulcer      Plan   -pat is failing O2 supp via oxygen pendent   -will need BiPaP vs intubation   -possible chest tube placement   -trail of albumin and lasix   -continue antibx   -monitor blood sugar with coverage   -advance directives  -DVT/ GI prphy  -ICU eval   - i discussed care with icu attend     NP covering pat and medicine informed of my plan

## 2021-10-05 NOTE — PROGRESS NOTE ADULT - SUBJECTIVE AND OBJECTIVE BOX
NP Note discussed with  Primary Attending    Patient is a 76y old  Male who presents with a chief complaint of R Foot Pain (05 Oct 2021 11:52)      INTERVAL HPI/OVERNIGHT EVENTS: no new complaints    MEDICATIONS  (STANDING):  ampicillin/sulbactam  IVPB 3 Gram(s) IV Intermittent every 6 hours  aspirin  chewable 81 milliGRAM(s) Oral daily  atorvastatin 80 milliGRAM(s) Oral at bedtime  bisacodyl Suppository 10 milliGRAM(s) Rectal once  chlorhexidine 2% Cloths 1 Application(s) Topical <User Schedule>  cyanocobalamin 1000 MICROGram(s) Oral daily  ergocalciferol 06089 Unit(s) Oral <User Schedule>  ferrous    sulfate 325 milliGRAM(s) Oral daily  finasteride 5 milliGRAM(s) Oral daily  fluconAZOLE IVPB      fluconAZOLE IVPB 100 milliGRAM(s) IV Intermittent every 24 hours  heparin   Injectable 5000 Unit(s) SubCutaneous every 8 hours  influenza   Vaccine 0.5 milliLiter(s) IntraMuscular once  insulin lispro (ADMELOG) corrective regimen sliding scale   SubCutaneous Before meals and at bedtime  levoFLOXacin  Tablet 250 milliGRAM(s) Oral every 24 hours  metoprolol succinate  milliGRAM(s) Oral daily  mirtazapine 7.5 milliGRAM(s) Oral daily  NIFEdipine XL 60 milliGRAM(s) Oral daily  pantoprazole    Tablet 40 milliGRAM(s) Oral before breakfast  polyethylene glycol 3350 17 Gram(s) Oral daily  senna 2 Tablet(s) Oral at bedtime  tamsulosin 0.4 milliGRAM(s) Oral at bedtime    MEDICATIONS  (PRN):  ondansetron Injectable 4 milliGRAM(s) IV Push three times a day PRN Nausea and/or Vomiting  sodium chloride 0.9% lock flush 10 milliLiter(s) IV Push every 1 hour PRN Pre/post blood products, medications, blood draw, and to maintain line patency      __________________________________________________  REVIEW OF SYSTEMS:    CONSTITUTIONAL: No fever,   EYES: no acute visual disturbances  NECK: No pain or stiffness  RESPIRATORY: occasional cough; No shortness of breath  CARDIOVASCULAR: No chest pain, no palpitations  GASTROINTESTINAL: No pain. No nausea or vomiting; No diarrhea   NEUROLOGICAL: No headache or numbness, no tremors  MUSCULOSKELETAL: No joint pain, no muscle pain  GENITOURINARY: no dysuria, no frequency, no hesitancy  PSYCHIATRY: no depression , no anxiety  ALL OTHER  ROS negative        Vital Signs Last 24 Hrs  T(C): 36.2 (05 Oct 2021 12:18), Max: 36.4 (04 Oct 2021 14:00)  T(F): 97.2 (05 Oct 2021 12:18), Max: 97.6 (04 Oct 2021 14:00)  HR: 69 (05 Oct 2021 12:18) (69 - 75)  BP: 116/55 (05 Oct 2021 12:18) (112/50 - 123/55)  BP(mean): --  RR: 20 (05 Oct 2021 12:18) (17 - 20)  SpO2: 90% (05 Oct 2021 12:18) (90% - 95%)    ________________________________________________  PHYSICAL EXAM:  GENERAL: NAD  HEENT: Normocephalic;  conjunctivae and sclerae clear; moist mucous membranes;   NECK : supple  CHEST/LUNG:  course throughout the lungs with cough   HEART: S1 S2  regular; no murmurs, gallops or rubs  ABDOMEN: Soft, Nontender, Nondistended; Bowel sounds present  EXTREMITIES: no cyanosis; no edema; no calf tenderness  SKIN: warm and dry; no rash  NERVOUS SYSTEM:  Awake and alert; Oriented  to place, person and time ; no new deficits   + ibrahim cath     _________________________________________________  LABS:                        10.0   7.73  )-----------( 332      ( 05 Oct 2021 06:43 )             30.3     10-05    143  |  105  |  27<H>  ----------------------------<  111<H>  3.8   |  24  |  2.17<H>    Ca    8.8      05 Oct 2021 06:43  Phos  4.0     10-05  Mg     2.4     10-05    TPro  7.2  /  Alb  2.5<L>  /  TBili  0.5  /  DBili  x   /  AST  28  /  ALT  18  /  AlkPhos  108  10-05        CAPILLARY BLOOD GLUCOSE      POCT Blood Glucose.: 114 mg/dL (05 Oct 2021 11:43)  POCT Blood Glucose.: 111 mg/dL (05 Oct 2021 07:41)  POCT Blood Glucose.: 129 mg/dL (04 Oct 2021 21:14)  POCT Blood Glucose.: 119 mg/dL (04 Oct 2021 16:55)        RADIOLOGY & ADDITIONAL TESTS:  < from: Xray Chest 1 View- PORTABLE-Routine (Xray Chest 1 View- PORTABLE-Routine in AM.) (10.02.21 @ 09:46) >    EXAM:  XR CHEST PORTABLE ROUTINE 1V                          EXAM:  XR CHEST PORTABLE ROUTINE 1V                            PROCEDURE DATE:  10/01/2021          INTERPRETATION:  Portable chest radiograph    CLINICAL INFORMATION: Pulmonary edema.    TECHNIQUE:  Portable  AP view of the chest.    COMPARISON: 9/28/2021 chest available for review.    FINDINGS:    The lungs show residual unchanged perihilar/basilar diffuse airspace disease.. No pneumothorax.        The  heart is enlarged in transverse diameter. No hilar mass.  Status post median sternotomy.     Visualized osseous structures are intact.    IMPRESSION:   No significant change..    FOLLOW-UP AP PORTABLE CHEST RADIOGRAPH 10/2/2021 AT 9:30 AM:  There is persistent bilateral perihilar/basilar diffuse airspace disease and/or RIGHT effusion.  Cardiomegaly.  No interval change.    --- End of Report ---            JASMYNE MOJICA MD; Attending Radiologist  This document has been electronically signed. Oct  2 2021 11:10AM    < end of copied text >    Imaging Personally Reviewed:  YES/  Consultant(s) Notes Reviewed:   YES/    Care Discussed with Consultants : nephrology/ cardiology/ podiatry / ID     Plan of care was discussed with patient and /or primary care giver; all questions and concerns were addressed and care was aligned with patient's wishes.     NP Note discussed with  Primary Attending    Patient is a 76y old  Male who presents with a chief complaint of R Foot Pain (05 Oct 2021 11:52)      INTERVAL HPI/OVERNIGHT EVENTS: no new complaints    MEDICATIONS  (STANDING):  ampicillin/sulbactam  IVPB 3 Gram(s) IV Intermittent every 6 hours  aspirin  chewable 81 milliGRAM(s) Oral daily  atorvastatin 80 milliGRAM(s) Oral at bedtime  bisacodyl Suppository 10 milliGRAM(s) Rectal once  chlorhexidine 2% Cloths 1 Application(s) Topical <User Schedule>  cyanocobalamin 1000 MICROGram(s) Oral daily  ergocalciferol 03274 Unit(s) Oral <User Schedule>  ferrous    sulfate 325 milliGRAM(s) Oral daily  finasteride 5 milliGRAM(s) Oral daily  fluconAZOLE IVPB      fluconAZOLE IVPB 100 milliGRAM(s) IV Intermittent every 24 hours  heparin   Injectable 5000 Unit(s) SubCutaneous every 8 hours  influenza   Vaccine 0.5 milliLiter(s) IntraMuscular once  insulin lispro (ADMELOG) corrective regimen sliding scale   SubCutaneous Before meals and at bedtime  levoFLOXacin  Tablet 250 milliGRAM(s) Oral every 24 hours  metoprolol succinate  milliGRAM(s) Oral daily  mirtazapine 7.5 milliGRAM(s) Oral daily  NIFEdipine XL 60 milliGRAM(s) Oral daily  pantoprazole    Tablet 40 milliGRAM(s) Oral before breakfast  polyethylene glycol 3350 17 Gram(s) Oral daily  senna 2 Tablet(s) Oral at bedtime  tamsulosin 0.4 milliGRAM(s) Oral at bedtime    MEDICATIONS  (PRN):  ondansetron Injectable 4 milliGRAM(s) IV Push three times a day PRN Nausea and/or Vomiting  sodium chloride 0.9% lock flush 10 milliLiter(s) IV Push every 1 hour PRN Pre/post blood products, medications, blood draw, and to maintain line patency      __________________________________________________  REVIEW OF SYSTEMS:    CONSTITUTIONAL: No fever,   EYES: no acute visual disturbances  NECK: No pain or stiffness  RESPIRATORY: occasional cough; No shortness of breath  CARDIOVASCULAR: No chest pain, no palpitations  GASTROINTESTINAL: No pain. No nausea or vomiting; No diarrhea   NEUROLOGICAL: No headache or numbness, no tremors  MUSCULOSKELETAL: No joint pain, no muscle pain  GENITOURINARY: no dysuria, no frequency, no hesitancy  PSYCHIATRY: no depression , no anxiety  ALL OTHER  ROS negative        Vital Signs Last 24 Hrs  T(C): 36.2 (05 Oct 2021 12:18), Max: 36.4 (04 Oct 2021 14:00)  T(F): 97.2 (05 Oct 2021 12:18), Max: 97.6 (04 Oct 2021 14:00)  HR: 69 (05 Oct 2021 12:18) (69 - 75)  BP: 116/55 (05 Oct 2021 12:18) (112/50 - 123/55)  BP(mean): --  RR: 20 (05 Oct 2021 12:18) (17 - 20)  SpO2: 90% (05 Oct 2021 12:18) (90% - 95%)    ________________________________________________  PHYSICAL EXAM:  GENERAL: NAD  HEENT: Normocephalic;  conjunctivae and sclerae clear; moist mucous membranes;   NECK : supple  CHEST/LUNG:  course throughout the lungs with cough   HEART: S1 S2  regular; no murmurs, gallops or rubs  ABDOMEN: Soft, Nontender, Nondistended; Bowel sounds present  EXTREMITIES: no cyanosis; no edema; no calf tenderness  SKIN: warm and dry; no rash  NERVOUS SYSTEM:  Awake and alert; Oriented  to place, person and time ; no new deficits   + ibrahim cath     _________________________________________________  LABS:                        10.0   7.73  )-----------( 332      ( 05 Oct 2021 06:43 )             30.3     10-05    143  |  105  |  27<H>  ----------------------------<  111<H>  3.8   |  24  |  2.17<H>    Ca    8.8      05 Oct 2021 06:43  Phos  4.0     10-05  Mg     2.4     10-05    TPro  7.2  /  Alb  2.5<L>  /  TBili  0.5  /  DBili  x   /  AST  28  /  ALT  18  /  AlkPhos  108  10-05        CAPILLARY BLOOD GLUCOSE      POCT Blood Glucose.: 114 mg/dL (05 Oct 2021 11:43)  POCT Blood Glucose.: 111 mg/dL (05 Oct 2021 07:41)  POCT Blood Glucose.: 129 mg/dL (04 Oct 2021 21:14)  POCT Blood Glucose.: 119 mg/dL (04 Oct 2021 16:55)        RADIOLOGY & ADDITIONAL TESTS:  < from: Xray Chest 1 View- PORTABLE-Routine (Xray Chest 1 View- PORTABLE-Routine in AM.) (10.02.21 @ 09:46) >    EXAM:  XR CHEST PORTABLE ROUTINE 1V                          EXAM:  XR CHEST PORTABLE ROUTINE 1V                            PROCEDURE DATE:  10/01/2021          INTERPRETATION:  Portable chest radiograph    CLINICAL INFORMATION: Pulmonary edema.    TECHNIQUE:  Portable  AP view of the chest.    COMPARISON: 9/28/2021 chest available for review.    FINDINGS:    The lungs show residual unchanged perihilar/basilar diffuse airspace disease.. No pneumothorax.        The  heart is enlarged in transverse diameter. No hilar mass.  Status post median sternotomy.     Visualized osseous structures are intact.    IMPRESSION:   No significant change..    FOLLOW-UP AP PORTABLE CHEST RADIOGRAPH 10/2/2021 AT 9:30 AM:  There is persistent bilateral perihilar/basilar diffuse airspace disease and/or RIGHT effusion.  Cardiomegaly.  No interval change.    --- End of Report ---            JASMYNE MOJICA MD; Attending Radiologist  This document has been electronically signed. Oct  2 2021 11:10AM    < end of copied text >    Imaging Personally Reviewed:  YES/    XAM:  CT CHEST                            PROCEDURE DATE:  10/05/2021          INTERPRETATION:  CLINICAL INFORMATION: 76 years  Male with pul edema s/p Lasix.    COMPARISON: CT abdomen and pelvis 9/7/2021    CONTRAST/COMPLICATIONS:  IV Contrast: NONE  Oral Contrast: NONE  Complications: None reported at time of study completion    PROCEDURE:  CT of the Chest was performed.  Sagittal and coronal reformats were performed.    FINDINGS:    LUNGS AND AIRWAYS: Patent central airways.  Intralobular septal thickening and groundglass opacity secondary to pulmonary edema. Bilateral lower lobe compressive atelectasis.  PLEURA: Moderate to large bilateral pleural effusions.  MEDIASTINUM AND HERBERT: Enlarged right lower paratracheal lymph nodes measuringup to 1.4 cm short axis. Limited evaluation of the herbert without IV contrast.  VESSELS: Coronary artery calcifications versus stents. Mild aortic atherosclerosis. Left PICC line catheter tip at the cavoatrial junction.  HEART: Heart size is normal. CABG. Aortic root calcifications. No pericardial effusion.  CHEST WALL AND LOWER NECK: Median sternotomy. Bilateral gynecomastia. Left thyroid lobe not visualized.  VISUALIZED UPPER ABDOMEN: Mildly distended gallbladder with cholelithiasis.  BONES: Thoracic degenerative changes.    IMPRESSION:  Pulmonary edema with bilateral moderate to large pleural effusions. Underlying bilateral lower lobe compressive atelectasis versus pneumonia.    Mildly enlarged mediastinal lymph nodes, possibly reactive.        --- End of Report ---            MARK THOMPSON MD; Attending Radiologist  This document has been electronically signed. Oct  5 2021  2:34PM    Consultant(s) Notes Reviewed:   YES/    Care Discussed with Consultants : nephrology/ cardiology/ podiatry / ID / pulm     Plan of care was discussed with patient and /or primary care giver; all questions and concerns were addressed and care was aligned with patient's wishes.

## 2021-10-05 NOTE — PROGRESS NOTE ADULT - SUBJECTIVE AND OBJECTIVE BOX
Patient is seen and examined at the bed side, is afebrile.  The creatinine stay elevated. The CT chest shows  Pulmonary edema with bilateral moderate to large pleural effusions.      REVIEW OF SYSTEMS: All other review systems are negative      ALLERGIES: No Known Allergies      Vital Signs Last 24 Hrs  T(C): 36.2 (05 Oct 2021 12:18), Max: 36.4 (05 Oct 2021 05:15)  T(F): 97.2 (05 Oct 2021 12:18), Max: 97.5 (05 Oct 2021 05:15)  HR: 71 (05 Oct 2021 16:58) (66 - 75)  BP: 120/55 (05 Oct 2021 16:58) (116/55 - 123/55)  BP(mean): --  RR: 18 (05 Oct 2021 16:58) (17 - 20)  SpO2: 90% (05 Oct 2021 16:58) (90% - 95%)      PHYSICAL EXAM:  GENERAL: Not in distress, on oxygen via NC  CHEST/LUNG:  Not using accessory muscles  HEART: s1 and s2 present  ABDOMEN:  Mild distended   EXTREMITIES: Right foot bandage in placed   CNS: Awake, Alert  and oriented        LABS:                        10.0   7.73  )-----------( 332      ( 05 Oct 2021 06:43 )             30.3                           8.9    5.87  )-----------( 291      ( 04 Oct 2021 06:10 )             27.4       10-05    143  |  105  |  27<H>  ----------------------------<  111<H>  3.8   |  24  |  2.17<H>    Ca    8.8      05 Oct 2021 06:43  Phos  4.0     10-05  Mg     2.4     10-05    TPro  7.2  /  Alb  2.5<L>  /  TBili  0.5  /  DBili  x   /  AST  28  /  ALT  18  /  AlkPhos  108  10-05      10-01    145  |  107  |  23<H>  ----------------------------<  109<H>  3.4<L>   |  27  |  1.48<H>    Ca    8.4      01 Oct 2021 06:35  Phos  2.9     10-01  Mg     2.2     10-01      TPro  7.1  /  Alb  2.3<L>  /  TBili  0.5  /  DBili  x   /  AST  20  /  ALT  15  /  AlkPhos  101  09-28 09-25    139  |  107  |  41<H>  ----------------------------<  134<H>  4.1   |  21<L>  |  4.05<H>    Ca    8.6      25 Sep 2021 06:40    TPro  6.6  /  Alb  2.3<L>  /  TBili  0.5  /  DBili  x   /  AST  25  /  ALT  15  /  AlkPhos  102  09-25        CAPILLARY BLOOD GLUCOSE  POCT Blood Glucose.: 134 mg/dL (17 Sep 2021 17:11)  POCT Blood Glucose.: 128 mg/dL (17 Sep 2021 11:06)  POCT Blood Glucose.: 157 mg/dL (17 Sep 2021 04:10)      MEDICATIONS  (STANDING):    albumin human 25% IVPB 50 milliLiter(s) IV Intermittent every 4 hours  ampicillin/sulbactam  IVPB 3 Gram(s) IV Intermittent every 6 hours  aspirin  chewable 81 milliGRAM(s) Oral daily  atorvastatin 80 milliGRAM(s) Oral at bedtime  bisacodyl Suppository 10 milliGRAM(s) Rectal once  chlorhexidine 2% Cloths 1 Application(s) Topical <User Schedule>  cyanocobalamin 1000 MICROGram(s) Oral daily  ergocalciferol 22016 Unit(s) Oral <User Schedule>  ferrous    sulfate 325 milliGRAM(s) Oral daily  finasteride 5 milliGRAM(s) Oral daily  fluconAZOLE IVPB      fluconAZOLE IVPB 100 milliGRAM(s) IV Intermittent every 24 hours  furosemide   Injectable 40 milliGRAM(s) IV Push every 6 hours  heparin   Injectable 5000 Unit(s) SubCutaneous every 8 hours  influenza   Vaccine 0.5 milliLiter(s) IntraMuscular once  insulin lispro (ADMELOG) corrective regimen sliding scale   SubCutaneous Before meals and at bedtime  levoFLOXacin  Tablet 250 milliGRAM(s) Oral every 24 hours  metoprolol succinate  milliGRAM(s) Oral daily  mirtazapine 7.5 milliGRAM(s) Oral daily  NIFEdipine XL 60 milliGRAM(s) Oral daily  pantoprazole    Tablet 40 milliGRAM(s) Oral before breakfast  polyethylene glycol 3350 17 Gram(s) Oral daily  senna 2 Tablet(s) Oral at bedtime  tamsulosin 0.4 milliGRAM(s) Oral at bedtime      RADIOLOGY & ADDITIONAL TESTS:    10/5/21 CT Chest No Cont (10.05.21 @ 14:07) Pulmonary edema with bilateral moderate to large pleural effusions. Underlying bilateral lower lobe compressive atelectasis versus pneumonia.  Mildly enlarged mediastinal lymph nodes, possibly reactive.      10/2/21: Xray Chest 1 View- PORTABLE-Routine (Xray Chest 1 View- PORTABLE-Routine in AM.) (10.02.21 @ 09:46) There is persistent bilateral perihilar/basilar diffuse airspace disease and/or RIGHT effusion.  Cardiomegaly.  No interval change.    Collected Date/Time: 9/22/2021 08:42 EDT   Received Date/Time: 9/23/2021 09:29 EDT   Surgical Pathology Report - Auth (Verified)   Specimen(s) Submitted   1 Right 5th Toe Wound Debridement r/o Osteomyelitis   Final Diagnosis   Right fifth toe; wound debridement:   - Bone with acute osteomyelitis and necrotic periosteal tissue.     9/28/21: US Abdomen Upper Quadrant Right (09.28.21 @ 12:13) Cholelithiasis without evidence of acute cholecystitis. Note is made of right pleural effusion    9/27/21: CT Abdomen and Pelvis w/ Oral Cont (09.27.21 @ 15:53) >No acute intra-abdominal pathology.    Small bilateral pleural effusions with compressive atelectasis of both lower lobes.    9/26/21: Xray Chest 1 View-PORTABLE IMMEDIATE (Xray Chest 1 View-PORTABLE IMMEDIATE .) (09.26.21 @ 15:28) : Small bilateral pleural effusions and mild pulmonary edema. A right lower lobe pneumonia is not excluded.      9/26/21: Xray Abdomen 1 View Portable, IMMEDIATE (Xray Abdomen 1 View Portable, IMMEDIATE .) (09.26.21 @ 15:28) : There is a nasogastric tube with its tip in the stomach. There is gaseous distention of the colon. No pathologic calcifications are seen. The osseous structures are intact with degenerative change is present in the spine.      9/17/21 : MR Foot No Cont, Right (09.17.21 @ 13:04) : Resection at the mid aspect of the fifth metatarsal. Lateral soft tissue wound with marrow signal abnormality throughout the fifth metatarsal and within the adjacent fourth metatarsal head which is nonspecific in the setting of recent surgery although osteomyelitis is suspected.    9/16/21 : Xray Foot AP + Lateral + Oblique, Right (09.16.21 @ 15:03) : No acute finding. No plain film evidence of osteomyelitis.        MICROBIOLOGY DATA:    Urine Microscopic-Add On (NC) (09.22.21 @ 16:14)   Red Blood Cell - Urine: 0-2 /HPF   White Blood Cell - Urine: 3-5 /HPF   Bacteria: Moderate /HPF   Comment - Urine: yeast cells present   Epithelial Cells: Moderate /HPF     Culture - Tissue with Gram Stain (09.22.21 @ 13:54)   Gram Stain: No polymorphonuclear cells seen per low power field   No organisms seen per oil power field   Specimen Source: .Tissue Right 5th toe r/o osteomyeltis   Culture Results: No growth     COVID-19 David Domain Antibody (09.18.21 @ 12:55)   COVID-19 David Domain Antibody Result: >250.00:    Culture - Blood (09.17.21 @ 10:28)   Specimen Source: .Blood Blood-Peripheral   Culture Results: No growth to date.     Culture - Blood (09.17.21 @ 10:28)   Specimen Source: .Blood Blood-Venous   Culture Results: No growth to date.     Culture - Surgical Swab (09.16.21 @ 21:42)   - Amikacin: S <=16   - Amikacin: S <=16   - Amoxicillin/Clavulanic Acid: S <=8/4   - Ampicillin: R >16 These ampicillin results predict results for amoxicillin   - Ampicillin: S <=2 Predicts results to ampicillin/sulbactam, amoxacillin-clavulanate and piperacillin-tazobactam.   - Ampicillin/Sulbactam: S 8/4 Enterobacter, Citrobacter, and Serratia may develop resistance during prolonged therapy (3-4 days)   - Ampicillin/Sulbactam: R >16/8   - Aztreonam: S <=4   - Aztreonam: S <=4   - Cefazolin: R 16 Enterobacter, Citrobacter, and Serratia may develop resistance during prolonged therapy (3-4 days)   - Cefazolin: R >16   - Cefepime: S <=2   - Cefepime: S <=2   - Cefoxitin: S <=8   - Cefoxitin: S <=8   - Ceftazidime: S <=1   - Ceftriaxone: S <=1   - Ceftriaxone: S <=1 Enterobacter, Citrobacter, and Serratia may develop resistance during prolonged therapy   - Ciprofloxacin: S <=0.25   - Ciprofloxacin: S <=0.25   - Ertapenem: S <=0.5   - Gentamicin: S <=2   - Gentamicin: S <=2   - Imipenem: S <=1   - Levofloxacin: S <=0.5   - Levofloxacin: S <=0.5   - Meropenem: S <=1   - Meropenem: S <=1   - Piperacillin/Tazobactam: S <=8   - Piperacillin/Tazobactam: S <=8   - Tetra/Doxy: S 4   - Tobramycin: S <=2   - Trimethoprim/Sulfamethoxazole: S <=0.5/9.5   - Trimethoprim/Sulfamethoxazole: S <=0.5/9.5   - Vancomycin: S 2   Specimen Source: .Surgical Swab right foot wound   Culture Results:   Culture yields >4 types of aerobic and/or anaerobic bacteria   Call client services within 7 days if further workup is clinically   indicated. Culture includes   Rare Aeromonas hydrophila/caviae   Few Klebsiella oxytoca/Raoutella ornithinolytica   Few Enterococcus faecalis   Few Streptococcus agalactiae (Group B) isolated   Group B streptococci are susceptible to ampicillin,   penicillin and cefazolin, but may be resistant to   erythromycin and clindamycin.   Recommendations for intrapartum prophylaxis for Group B   streptococci are penicillin or ampicillin.   Organism Identification: Aeromonas hydrophila/caviae   Klebsiella oxytoca /Raoutella ornithinolytica   Enterococcus faecalis   Organism: Aeromonas hydrophila/caviae   Organism: Klebsiella oxytoca /Raoutella ornithinolytica   Organism: Enterococcus faecalis   COVID-19 PCR . (09.16.21 @ 22:24)   COVID-19 PCR: NotDetec:                   Patient is seen and examined at the bed side, is afebrile.  The creatinine stay elevated. The CT chest shows  Pulmonary edema with bilateral moderate to large pleural effusions. He has transferred to ICU for desaturation and thoracentesis with Chest tube placement.       REVIEW OF SYSTEMS: All other review systems are negative      ALLERGIES: No Known Allergies      Vital Signs Last 24 Hrs  T(C): 36.2 (05 Oct 2021 12:18), Max: 36.4 (05 Oct 2021 05:15)  T(F): 97.2 (05 Oct 2021 12:18), Max: 97.5 (05 Oct 2021 05:15)  HR: 71 (05 Oct 2021 16:58) (66 - 75)  BP: 120/55 (05 Oct 2021 16:58) (116/55 - 123/55)  BP(mean): --  RR: 18 (05 Oct 2021 16:58) (17 - 20)  SpO2: 90% (05 Oct 2021 16:58) (90% - 95%)      PHYSICAL EXAM:  GENERAL: Not in distress, on 8 Liter oxygen via NC  CHEST/LUNG:  Not using accessory muscles  HEART: s1 and s2 present  ABDOMEN:  Mild distended   EXTREMITIES: Right foot bandage in placed   CNS: Awake, and  Alert        LABS:                        10.0   7.73  )-----------( 332      ( 05 Oct 2021 06:43 )             30.3                           8.9    5.87  )-----------( 291      ( 04 Oct 2021 06:10 )             27.4       10-05    143  |  105  |  27<H>  ----------------------------<  111<H>  3.8   |  24  |  2.17<H>    Ca    8.8      05 Oct 2021 06:43  Phos  4.0     10-05  Mg     2.4     10-05    TPro  7.2  /  Alb  2.5<L>  /  TBili  0.5  /  DBili  x   /  AST  28  /  ALT  18  /  AlkPhos  108  10-05      10-01    145  |  107  |  23<H>  ----------------------------<  109<H>  3.4<L>   |  27  |  1.48<H>    Ca    8.4      01 Oct 2021 06:35  Phos  2.9     10-01  Mg     2.2     10-01      TPro  7.1  /  Alb  2.3<L>  /  TBili  0.5  /  DBili  x   /  AST  20  /  ALT  15  /  AlkPhos  101  09-28 09-25    139  |  107  |  41<H>  ----------------------------<  134<H>  4.1   |  21<L>  |  4.05<H>    Ca    8.6      25 Sep 2021 06:40    TPro  6.6  /  Alb  2.3<L>  /  TBili  0.5  /  DBili  x   /  AST  25  /  ALT  15  /  AlkPhos  102  09-25        CAPILLARY BLOOD GLUCOSE  POCT Blood Glucose.: 134 mg/dL (17 Sep 2021 17:11)  POCT Blood Glucose.: 128 mg/dL (17 Sep 2021 11:06)  POCT Blood Glucose.: 157 mg/dL (17 Sep 2021 04:10)      MEDICATIONS  (STANDING):    albumin human 25% IVPB 50 milliLiter(s) IV Intermittent every 4 hours  ampicillin/sulbactam  IVPB 3 Gram(s) IV Intermittent every 6 hours  aspirin  chewable 81 milliGRAM(s) Oral daily  atorvastatin 80 milliGRAM(s) Oral at bedtime  bisacodyl Suppository 10 milliGRAM(s) Rectal once  chlorhexidine 2% Cloths 1 Application(s) Topical <User Schedule>  cyanocobalamin 1000 MICROGram(s) Oral daily  ergocalciferol 98644 Unit(s) Oral <User Schedule>  ferrous    sulfate 325 milliGRAM(s) Oral daily  finasteride 5 milliGRAM(s) Oral daily  fluconAZOLE IVPB      fluconAZOLE IVPB 100 milliGRAM(s) IV Intermittent every 24 hours  furosemide   Injectable 40 milliGRAM(s) IV Push every 6 hours  heparin   Injectable 5000 Unit(s) SubCutaneous every 8 hours  influenza   Vaccine 0.5 milliLiter(s) IntraMuscular once  insulin lispro (ADMELOG) corrective regimen sliding scale   SubCutaneous Before meals and at bedtime  levoFLOXacin  Tablet 250 milliGRAM(s) Oral every 24 hours  metoprolol succinate  milliGRAM(s) Oral daily  mirtazapine 7.5 milliGRAM(s) Oral daily  NIFEdipine XL 60 milliGRAM(s) Oral daily  pantoprazole    Tablet 40 milliGRAM(s) Oral before breakfast  polyethylene glycol 3350 17 Gram(s) Oral daily  senna 2 Tablet(s) Oral at bedtime  tamsulosin 0.4 milliGRAM(s) Oral at bedtime      RADIOLOGY & ADDITIONAL TESTS:    10/5/21 CT Chest No Cont (10.05.21 @ 14:07) Pulmonary edema with bilateral moderate to large pleural effusions. Underlying bilateral lower lobe compressive atelectasis versus pneumonia.  Mildly enlarged mediastinal lymph nodes, possibly reactive.      10/2/21: Xray Chest 1 View- PORTABLE-Routine (Xray Chest 1 View- PORTABLE-Routine in AM.) (10.02.21 @ 09:46) There is persistent bilateral perihilar/basilar diffuse airspace disease and/or RIGHT effusion.  Cardiomegaly.  No interval change.    Collected Date/Time: 9/22/2021 08:42 EDT   Received Date/Time: 9/23/2021 09:29 EDT   Surgical Pathology Report - Auth (Verified)   Specimen(s) Submitted   1 Right 5th Toe Wound Debridement r/o Osteomyelitis   Final Diagnosis   Right fifth toe; wound debridement:   - Bone with acute osteomyelitis and necrotic periosteal tissue.     9/28/21: US Abdomen Upper Quadrant Right (09.28.21 @ 12:13) Cholelithiasis without evidence of acute cholecystitis. Note is made of right pleural effusion    9/27/21: CT Abdomen and Pelvis w/ Oral Cont (09.27.21 @ 15:53) >No acute intra-abdominal pathology.    Small bilateral pleural effusions with compressive atelectasis of both lower lobes.    9/26/21: Xray Chest 1 View-PORTABLE IMMEDIATE (Xray Chest 1 View-PORTABLE IMMEDIATE .) (09.26.21 @ 15:28) : Small bilateral pleural effusions and mild pulmonary edema. A right lower lobe pneumonia is not excluded.      9/26/21: Xray Abdomen 1 View Portable, IMMEDIATE (Xray Abdomen 1 View Portable, IMMEDIATE .) (09.26.21 @ 15:28) : There is a nasogastric tube with its tip in the stomach. There is gaseous distention of the colon. No pathologic calcifications are seen. The osseous structures are intact with degenerative change is present in the spine.      9/17/21 : MR Foot No Cont, Right (09.17.21 @ 13:04) : Resection at the mid aspect of the fifth metatarsal. Lateral soft tissue wound with marrow signal abnormality throughout the fifth metatarsal and within the adjacent fourth metatarsal head which is nonspecific in the setting of recent surgery although osteomyelitis is suspected.    9/16/21 : Xray Foot AP + Lateral + Oblique, Right (09.16.21 @ 15:03) : No acute finding. No plain film evidence of osteomyelitis.        MICROBIOLOGY DATA:    Urine Microscopic-Add On (NC) (09.22.21 @ 16:14)   Red Blood Cell - Urine: 0-2 /HPF   White Blood Cell - Urine: 3-5 /HPF   Bacteria: Moderate /HPF   Comment - Urine: yeast cells present   Epithelial Cells: Moderate /HPF     Culture - Tissue with Gram Stain (09.22.21 @ 13:54)   Gram Stain: No polymorphonuclear cells seen per low power field   No organisms seen per oil power field   Specimen Source: .Tissue Right 5th toe r/o osteomyeltis   Culture Results: No growth     COVID-19 David Domain Antibody (09.18.21 @ 12:55)   COVID-19 David Domain Antibody Result: >250.00:    Culture - Blood (09.17.21 @ 10:28)   Specimen Source: .Blood Blood-Peripheral   Culture Results: No growth to date.     Culture - Blood (09.17.21 @ 10:28)   Specimen Source: .Blood Blood-Venous   Culture Results: No growth to date.     Culture - Surgical Swab (09.16.21 @ 21:42)   - Amikacin: S <=16   - Amikacin: S <=16   - Amoxicillin/Clavulanic Acid: S <=8/4   - Ampicillin: R >16 These ampicillin results predict results for amoxicillin   - Ampicillin: S <=2 Predicts results to ampicillin/sulbactam, amoxacillin-clavulanate and piperacillin-tazobactam.   - Ampicillin/Sulbactam: S 8/4 Enterobacter, Citrobacter, and Serratia may develop resistance during prolonged therapy (3-4 days)   - Ampicillin/Sulbactam: R >16/8   - Aztreonam: S <=4   - Aztreonam: S <=4   - Cefazolin: R 16 Enterobacter, Citrobacter, and Serratia may develop resistance during prolonged therapy (3-4 days)   - Cefazolin: R >16   - Cefepime: S <=2   - Cefepime: S <=2   - Cefoxitin: S <=8   - Cefoxitin: S <=8   - Ceftazidime: S <=1   - Ceftriaxone: S <=1   - Ceftriaxone: S <=1 Enterobacter, Citrobacter, and Serratia may develop resistance during prolonged therapy   - Ciprofloxacin: S <=0.25   - Ciprofloxacin: S <=0.25   - Ertapenem: S <=0.5   - Gentamicin: S <=2   - Gentamicin: S <=2   - Imipenem: S <=1   - Levofloxacin: S <=0.5   - Levofloxacin: S <=0.5   - Meropenem: S <=1   - Meropenem: S <=1   - Piperacillin/Tazobactam: S <=8   - Piperacillin/Tazobactam: S <=8   - Tetra/Doxy: S 4   - Tobramycin: S <=2   - Trimethoprim/Sulfamethoxazole: S <=0.5/9.5   - Trimethoprim/Sulfamethoxazole: S <=0.5/9.5   - Vancomycin: S 2   Specimen Source: .Surgical Swab right foot wound   Culture Results:   Culture yields >4 types of aerobic and/or anaerobic bacteria   Call client services within 7 days if further workup is clinically   indicated. Culture includes   Rare Aeromonas hydrophila/caviae   Few Klebsiella oxytoca/Raoutella ornithinolytica   Few Enterococcus faecalis   Few Streptococcus agalactiae (Group B) isolated   Group B streptococci are susceptible to ampicillin,   penicillin and cefazolin, but may be resistant to   erythromycin and clindamycin.   Recommendations for intrapartum prophylaxis for Group B   streptococci are penicillin or ampicillin.   Organism Identification: Aeromonas hydrophila/caviae   Klebsiella oxytoca /Raoutella ornithinolytica   Enterococcus faecalis   Organism: Aeromonas hydrophila/caviae   Organism: Klebsiella oxytoca /Raoutella ornithinolytica   Organism: Enterococcus faecalis   COVID-19 PCR . (09.16.21 @ 22:24)   COVID-19 PCR: NotDetec:

## 2021-10-05 NOTE — PROGRESS NOTE ADULT - PROBLEM SELECTOR PLAN 8
Resolved.  CT abdomen as above  Continue to monitor. Continue ASA, Lipitor and metoprolol  Hold losartan secondary to OREN  EF 55-60%, GIDD   Cardiology recommends SABIHA and L/R cardiac cath TAVR, after treatment of infection and medical optimization.

## 2021-10-05 NOTE — PROGRESS NOTE ADULT - PROBLEM SELECTOR PLAN 7
Continue ASA, Lipitor and metoprolol  Hold losartan secondary to OREN  EF 55-60%, GIDD   Cardiology recommends SABIHA and L/R cardiac cath TAVR, after treatment of infection and medical optimization. Continue sliding scale coverage.  Monitor glucose.  A1c  11.4

## 2021-10-05 NOTE — CHART NOTE - NSCHARTNOTEFT_GEN_A_CORE
Increasing SOB and work of breathing. Using accessory muscles.  Oxygen saturation on 6LPM 85%.  AB.49/33/52/25/ 86%  Patient placed on Optimizer Pendent 10LPM. Oxygen saturation 86%.  Diffusely diminished breath sounds on right side. +Crackles left base.  Dr Xavier rec. transfer to ICU.  ICU consult called. May need thoracentesis.

## 2021-10-05 NOTE — PROGRESS NOTE ADULT - PROBLEM SELECTOR PLAN 1
Afebrile  wound culture growing Enterococcus Aeromona and Klebsiella  pt needs  IV for 6 wks until 11/3  as per ID Dr Barrett ( can dc on zosyn and fluconazole)   currently on unasyn, fluconazole and levaquin as per Dr Arnold Ferro completed.  S/P PICC placement 10/1  Continue wound vac as per Podiatry

## 2021-10-05 NOTE — PROGRESS NOTE ADULT - PROBLEM SELECTOR PLAN 2
Secondary to severe AS  Cardiology recommending SABIHA and R+L heart cath/ TAVR once he is medically optimized and infection treated  Cardiology Franky Fraire  Nephro Dr. Rodriguez  s/p lasix tx, noted O2 sat of 89% on 2L O2 - will get urgent CT chest without contrast   then consider pulm consult see CT As above  large b/l pleural effusion with PNA - on levaquin renally dosed   Dr. Xavier consulted - [facility] Albumin and lasix tx first then consider thoracentesis if pt does not respond to non- invasive tx plan   informed daughter Arabella - readdressed he is deteriorating and due to worsening of kidney function, he might not able to tolerate the tx, she understood, asked to be called if he needs thoracentesis

## 2021-10-05 NOTE — GOALS OF CARE CONVERSATION - ADVANCED CARE PLANNING - CONVERSATION DETAILS
updated  today; new finding which is bilateral large pleural effusion and pneumonia  Due to pleural effusion pt was started on albumin with lasix treatment plan, however,  pt was found to desaturating at 86% on 10L of pendant every after the 1 dose of albumin and lasix. therefore, pulmonology attending evaluated him at bedside, who recommends albumin and lasix treatment initially, now recommending ICU care based on ABC result with hypoxia. Likely needs thoracentesis   Explained to daughter Arabella with possible deconditioning despite of all the care we can provide due to his underlying conditions   she  understood but still wanted to try all medical interventions including CPR and intubation

## 2021-10-05 NOTE — CONSULT NOTE ADULT - ASSESSMENT
Assessment:  - Acute Hypoxic Respiratory Failure  - Sepsis 2/2 to Aspiration PNA vs R foot OM   - OREN superimposed on CKD  - Severe AS   - CAD s/p CABG   - Cholelithiasis   - HTN  - HLD    Plan:  Neuro:  - avoid any sedating meds  - C/W sedation    CV:  - Hold BP medications  - check echo   - C/W Vasopresor support    - resume ASA, statin for CAD    Pulm:  - Acute Hypoxic/ Hypercapnic Resp Failure  - C/W Ventilator support  - very dense left lower lobe pneumonia with minimal pleural effusion on US  - biapp for hypoxia and work of breathing  - patient and family understands he may need intubation if he worsens     ID:  - empiric CAP coverage plus vanco as he had hospitalization a month ago  - flu negative   - Check Blood Cultures  - Check U/C    Nephro:  - monitor urine output   - OREN on CKD  - Urine Lytes sent including Urine sodium, creatinine, urea   - Renal U/S  - Nephrology Consulted Dr. SERRANO:  - npo until off bipap  - GI consulted    Heme:  - no indication for transfusion   - Iron defficiency. Follow Iron studies    Endo:  - target CBG < 180  - Start Sliding Scale  - Lantus_____ and Humalog_____    FEN:  - minimize IVF to avoid worsening breathing     Prophy:  - resume DOAC  - C/W Lovenox/ Heparin     Dispo:  - monitor in the ICU until off bipap      _________CNS___________    #pain   #sedation    _________CVS___________    #pressors    _________ RESP__________    __________GI____________    ________ RENAL__________    _________MSK___________    __________ID____________    _________ENDO__________    _______HEME/ONC_______    _________SKIN____________    ________Prophylaxis_______  #DVT  #GI     __________GOC/ DISPO___________     Assessment:  - Acute Hypoxic Respiratory Failure  - Sepsis 2/2 to Aspiration PNA vs R foot OM   - OREN superimposed on CKD  - Severe AS   - CAD s/p CABG   - Cholelithiasis   - HTN  - HLD    Plan:  Neuro:  - Patient currently AA0x3, moving all 4 extremities to command   - avoid any sedating meds  - Was on Mirtazapine; will hold   - Monitor for AMS while on BIPAP    CVS:  # Severe Aortic Stenosis   - Echo confirmed EF 55-60%, Severe LVH, g1 diastolic dysfunction, and severe AS   - CXR showing improved R sided pleural effusion with diuresis   - CT Chest with pulm edema bilaterally; moderate to large pleural effusions; atelectasis vs PNA     #CAD   - S/p CABG, on ASA   - Continue Toprol 100 Qd, Procardia 60XL  - C/w Lasix 40 IV mg Q6h, Albumin 100mg   - Positive trop 1.46->1.050; likely due to increased demand and renal failure.  - No active chest pain   - Echo As above   - C/w ASA, statin  - Will need a SABIHA and R+L heart cath once medically optimized  - Cardiology Dr. Franky Aceves:  - Acute Hypoxic/ Hypercapnic Resp Failure  - Patient on Pendant, saturating 90% on 10L, but having increased WOB, tachypnea   - Possible left lower lobe pneumonia and bilateral pleural effusions on CT Chest   - ABG showing 7.49/33/52/25-> respiratory alkalosis with hypoxic   - Bipap 10/5 for hypoxia and work of breathing  - patient and family understands he may need intubation if he worsens (FULL CODE)  - CT Surgery consulted for possible thoracentesis vs Chest tube   - Send fluid for tests/ cultures     ID:  - Empiric Aspiration PNA coverage, R foot osteomyelitis, and Funguria   - Blood Cultures -ve till date, Bone and wound culture: showing Aeromonas, GBS, Klebsiella, Enterococcus   - UCX showing yeast   - C/w Unasyn, Levaquin, Diflucan; renally dosed     Nephro:  - Ibrahim placed for possible obstruction   - OREN on CKD: ATN was resolving, however renal fxn now worsened.   - Kidney/ Bladder US with large distended bladder s/p ibrahim placement on 9/23, then removed.   - S/p bladder scan 10/4 with 1L urine for which Ibrahim was reinserted  - FeUrea: 20.5% 9//23: pre renal   - Less likely due to obstructive uropathy.  No peripheral eosinophilia- no signs of AIN.   - Avoid nephrotoxins/ NSAIDs/ RCA. Monitor BMP.  - Nephrology Consulted      GI:  - NPO until off bipap  - GI consulted    Heme:  - no indication for transfusion   - Iron deficiency Follow Iron studies    Endo:  - target CBG < 180  - C/w Sliding Scale    Prophy:  - HSQ   - PPI Qd     Dispo:  - monitor in the ICU until off bipap   Assessment:  - Acute Hypoxic Respiratory Failure  - Sepsis 2/2 to Aspiration PNA vs R foot OM   - OREN superimposed on CKD  - Severe AS   - CAD s/p CABG   - Cholelithiasis   - HTN  - HLD    Plan:  Neuro:  - Patient currently AA0x3, moving all 4 extremities to command   - avoid any sedating meds  - Was on Mirtazapine; will hold   - Monitor for AMS while on BIPAP    CVS:  # Severe Aortic Stenosis   - Echo confirmed EF 55-60%, Severe LVH, g1 diastolic dysfunction, and severe AS   - CXR showing improved R sided pleural effusion with diuresis   - CT Chest with pulm edema bilaterally; moderate to large pleural effusions; atelectasis vs PNA     #CAD   - S/p CABG, on ASA   - Continue Toprol 100 Qd, Procardia 60XL  - C/w Lasix 40 IV mg Q6h, Albumin 100mg   - Positive trop 1.46->1.050; likely due to increased demand and renal failure.  - No active chest pain   - Echo As above   - C/w ASA, statin  - Will need a SABIHA and R+L heart cath once medically optimized  - Cardiology Dr. Franky Aceves:  - Acute Hypoxic/ Hypercapnic Resp Failure  - Patient on Pendant, saturating 90% on 10L, but having increased WOB, tachypnea   - Possible left lower lobe pneumonia and bilateral pleural effusions on CT Chest   - ABG showing 7.49/33/52/25-> respiratory alkalosis with hypoxic   - Bipap 10/5 for hypoxia and work of breathing  - patient and family understands he may need intubation if he worsens (FULL CODE)  - CT Surgery consulted for possible thoracentesis vs Chest tube   - Send fluid for tests/ cultures     ID:  - Empiric Aspiration PNA coverage, R foot osteomyelitis, and Funguria   - Blood Cultures -ve till date, Bone and wound culture: showing Aeromonas, GBS, Klebsiella, Enterococcus   - UCX showing yeast   - C/w Unasyn, Levaquin, Diflucan; renally dosed     Nephro:  - Ibrahim placed for possible obstruction   - OREN on CKD: ATN was resolving, however renal fxn now worsened.   - Kidney/ Bladder US with large distended bladder s/p ibrahim placement on 9/23, then removed.   - S/p bladder scan 10/4 with 1L urine for which Ibrahim was reinserted  - FeUrea: 20.5% 9//23: pre renal   - Less likely due to obstructive uropathy.  No peripheral eosinophilia- no signs of AIN.   - Avoid nephrotoxins/ NSAIDs/ RCA. Monitor BMP.  - Nephrology Consulted Dr. Rodriguez     GI:  - NPO until off bipap  - GI consulted    Heme:  - no indication for transfusion   - Iron deficiency Follow Iron studies    Endo:  - target CBG < 180  - C/w Sliding Scale    Prophy:  - HSQ   - PPI Qd     Dispo:  - monitor in the ICU until off bipap   Assessment:  - Acute Hypoxic Respiratory Failure  - Sepsis 2/2 to Aspiration PNA vs R foot OM   - OREN superimposed on CKD  - Severe AS   - CAD s/p CABG   - Cholelithiasis   - HTN  - HLD    Plan:  Neuro:  - Patient currently AA0x3, moving all 4 extremities to command   - avoid any sedating meds  - Was on Mirtazapine; will hold   - Monitor for AMS while on BIPAP    CVS:  # Severe Aortic Stenosis   - Echo confirmed EF 55-60%, Severe LVH, g1 diastolic dysfunction, and severe AS   - CXR showing improved R sided pleural effusion with diuresis   - CT Chest with pulm edema bilaterally; moderate to large pleural effusions; atelectasis vs PNA   - Likely causing acute on chronic CHF exacerbation   - BNP 19K with worsening overload as above; pt with poor cardiac reserve worsened by acute infection   - Avoid drugs which increase afterload     #CAD   - S/p CABG, on ASA   - Continue Toprol 100 Qd, Procardia 60XL  - C/w Lasix 40 IV mg Q6h, Albumin 100mg   - Positive trop 1.46->1.050; likely due to increased demand and renal failure.  - No active chest pain   - Echo As above   - C/w ASA, statin  - Will need a SABIHA and R+L heart cath once medically optimized  - Cardiology Dr. Wilknis     [place]:  - Acute Hypoxic/ Hypercapnic Resp Failure  - Patient on Pendant, saturating 90% on 10L, but having increased WOB, tachypnea   - Possible left lower lobe pneumonia and bilateral pleural effusions on CT Chest   - ABG showing 7.49/33/52/25-> respiratory alkalosis with hypoxic   - Bipap 10/5 for hypoxia and work of breathing  - patient and family understands he may need intubation if he worsens (FULL CODE)  - CT Surgery consulted for possible thoracentesis vs Chest tube   - Send fluid for tests/ cultures     ID:  - Empiric Aspiration PNA coverage, R foot osteomyelitis, and Funguria   - Blood Cultures -ve till date, Bone and wound culture: showing Aeromonas, GBS, Klebsiella, Enterococcus   - UCX showing yeast   - C/w Unasyn, Levaquin, Diflucan; renally dosed     Nephro:  - Ibrahim placed for possible obstruction   - OREN on CKD: ATN was resolving, however renal fxn now worsened.   - Kidney/ Bladder US with large distended bladder s/p ibrahim placement on 9/23, then removed.   - S/p bladder scan 10/4 with 1L urine for which Ibrahim was reinserted  - FeUrea: 20.5% 9//23: pre renal   - Less likely due to obstructive uropathy.  No peripheral eosinophilia- no signs of AIN.   - Avoid nephrotoxins/ NSAIDs/ RCA. Monitor BMP.  - Nephrology Consulted Dr. Rodriguez     GI:  - NPO until off bipap  - Had an Aspiration event after NG tube placed and subsequently removed by pt  - Cholelithiasis noted on RUQ US   - Pt continues to have constipation/ c/w bowel regimen: monitor Stool Count   - Surgery consulted; no acute intervention  - Aspiration precautions     Heme:  - no indication for transfusion currently  - admitted with Hb 12.7 in August; now 8.8  - No active bleeding, normocytic     Endo:  - target CBG < 180  - C/w Sliding Scale, accuchecks ACHS     Prophy:  - HSQ   - PPI Qd     Dispo:  - monitor in the ICU until off bipap  PT FULL CODE - confirmed w/ Daughter Mrs. Bell Pj    Assessment:  - Acute Hypoxic Respiratory Failure  - Sepsis 2/2 to Aspiration PNA vs R foot OM   - OREN superimposed on CKD  - Severe AS   - CAD s/p CABG   - Cholelithiasis   - HTN  - HLD    Plan:  Neuro:  - Patient currently AA0x3, moving all 4 extremities to command   - avoid any sedating meds  - Was on Mirtazapine; will hold   - Monitor for AMS while on BIPAP    CVS:  # Severe Aortic Stenosis   - Echo confirmed EF 55-60%, Severe LVH, g1 diastolic dysfunction, and severe AS   - CXR showing improved R sided pleural effusion with diuresis   - CT Chest with pulm edema bilaterally; moderate to large pleural effusions; atelectasis vs PNA   - Likely causing acute on chronic CHF exacerbation   - BNP 19K with worsening overload as above; pt with poor cardiac reserve worsened by acute infection   - Avoid drugs which increase afterload     #CAD   - S/p CABG, on ASA   - Continue Toprol 100 Qd, Procardia 60XL  - C/w Lasix 40 IV mg Q6h, Albumin 100mg   - Positive trop 1.46->1.050; likely due to increased demand and renal failure.  - No active chest pain   - Echo As above   - C/w ASA, statin  - Will need a SABIHA and R+L heart cath once medically optimized  - Cardiology Dr. Wilkins     [place]:  - Acute Hypoxic/ Hypercapnic Resp Failure  - Patient on Pendant, saturating 90% on 10L, but having increased WOB, tachypnea   - Possible left lower lobe pneumonia and bilateral pleural effusions on CT Chest   - ABG showing 7.49/33/52/25-> respiratory alkalosis with hypoxic   - Bipap 10/5 for hypoxia and work of breathing  - patient and family understands he may need intubation if he worsens (FULL CODE)  - CT Surgery consulted for possible thoracentesis vs Chest tube   - Send fluid for tests/ cultures     ID:  - Empiric Aspiration PNA coverage, R foot osteomyelitis, and Funguria   - Blood Cultures -ve till date, Bone and wound culture: showing Aeromonas, GBS, Klebsiella, Enterococcus   - UCX showing yeast   - C/w Unasyn, Levaquin, Diflucan; renally dosed     Nephro:  - Ibrahim placed for possible obstruction   - OREN on CKD: ATN was resolving, however renal fxn now worsened.   - Kidney/ Bladder US with large distended bladder s/p ibrahim placement on 9/23, then removed.   - S/p bladder scan 10/4 with 1L urine for which Ibrahim was reinserted  - FeUrea: 20.5% 9//23: pre renal   - Less likely due to obstructive uropathy.  No peripheral eosinophilia- no signs of AIN.   - Avoid nephrotoxins/ NSAIDs/ RCA. Monitor BMP.  - Nephrology Consulted Dr. Rodriguez     GI:  - NPO until off bipap  - Had an Aspiration event after NG tube placed and subsequently removed by pt  - Cholelithiasis noted on RUQ US   - Pt continues to have constipation/ c/w bowel regimen: monitor Stool Count   - Surgery consulted; no acute intervention  - Aspiration precautions     Heme:  - no indication for transfusion currently  - admitted with Hb 12.7 in August; now 8.8  - No active bleeding, normocytic   - Fe panel shows deficiency; will hold repletion during acute infection  - Likely component of ACD  - Tranfuse if Hb<7 or if worsening tachy/HF sx     Endo:  - target CBG < 180  - C/w Sliding Scale, accuchecks ACHS     Prophy:  - HSQ   - PPI Qd     Dispo:  - monitor in the ICU until off bipap  PT FULL CODE - confirmed w/ Daughter Mrs. Arabella Oliva

## 2021-10-05 NOTE — PROGRESS NOTE ADULT - PROBLEM SELECTOR PLAN 10
Continue antibiotics thru 11/3 as per ID  Discharge planning started.  Continue wound vac as per Podiatry. PT rec STEVAN - pending wound vac delivery to facility  Will need IV antibiotics thru 11/3 ( zosyn and fluconazole)   PICC placed 10/1  f/u CT chest

## 2021-10-05 NOTE — PROGRESS NOTE ADULT - PROBLEM SELECTOR PLAN 4
Will need SABIHA, R&L heart cath and TAVR workup once medically stable and infection treated. OREN superimposed on Stage III CKD   mixed etiology prerenal, intrarenal and postrenal  Creatinine  unchanged even with ibrahim cath   avoid nephrotoxic agents, dose as per GFR  monitor creatinine, electrolytes  holding Lisinopril, Nifedipine incr. 90 mg daily   C3/ C4; ASO negative  Urine eosinophils negative  renal ultrasound negative for hydronephrosis, hyperechoic kidneys significant for medical renal disease  Nephrology Dr. Rodriguez.

## 2021-10-05 NOTE — PROGRESS NOTE ADULT - ASSESSMENT
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  As per daughter, patient was taking tylenol which did not help his pain prompting the patient to ask his daughter to take him to the hospital. ON admission, he has no fever or Leukocytosis. The Xray of Right foot shows no Osteomyelitis but MRI of Right foot shows Lateral soft tissue wound with marrow signal abnormality throughout the fifth metatarsal which is consistent of Osteomyelitis. He has seen by Podiatry and wound culture has sent, Zosyn and Vancomycin has started. The ID consult requested to assist with further evaluation and antibiotic management.     # Right Fifth metatarsal DFU with drainage and Osteomyelitis- wound cx grew Enterococcus, Aeromonas and Klebsiella - Zosyn is the ideal to cover All organisms but kidney function is worsening, hence change to Unasyn and Levaquin until kidney function is improved - The pathology shows Bone with acute osteomyelitis and necrotic periosteal tissue.   # OREN- s/p urinary retention -s/p ibrahim catheter - s/p discontinue ACEI  # RLL pneumonia- most likely Aspiration, S/p Vomiting - On Unasyn  # Large bowel distension  # Candiduria- 9/22/21  # Pulmonary edema with bilateral moderate to large pleural effusions- on CT chest, 10/5/21    would recommend:    1. Management of Pulmonary edema as per Primary team  2. Monitor  kidney function and c/w adjusted doses of Levaquin and  Unasyn as per crcl  3. Wound care as per Podiatry  4. Aspiration precaution  5. May change to Zosyn and oral fluconazole on discharge to continue until 11/3/21 to cover Osteomyelitis    d/w Covering NP     Attending Attestation:    Spent more than 35 minutes on total encounter, more than 50 % of the visit was spent counseling and/or coordinating care by the Attending physician. 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  As per daughter, patient was taking tylenol which did not help his pain prompting the patient to ask his daughter to take him to the hospital. ON admission, he has no fever or Leukocytosis. The Xray of Right foot shows no Osteomyelitis but MRI of Right foot shows Lateral soft tissue wound with marrow signal abnormality throughout the fifth metatarsal which is consistent of Osteomyelitis. He has seen by Podiatry and wound culture has sent, Zosyn and Vancomycin has started. The ID consult requested to assist with further evaluation and antibiotic management.     # Right Fifth metatarsal DFU with drainage and Osteomyelitis- wound cx grew Enterococcus, Aeromonas and Klebsiella - Zosyn is the ideal to cover All organisms but kidney function is worsening, hence change to Unasyn and Levaquin until kidney function is improved - The pathology shows Bone with acute osteomyelitis and necrotic periosteal tissue.   # OREN- s/p urinary retention -s/p ibrahim catheter - s/p discontinue ACEI  # RLL pneumonia- most likely Aspiration, S/p Vomiting - On Unasyn  # Large bowel distension  # Candiduria- 9/22/21  # Pulmonary edema with bilateral moderate to large pleural effusions- on CT chest, 10/5/21    would recommend:    1. Management of Pulmonary edema as per ICU and thoracentesis with Bacterial, Fungal and AFB culture  2. Supplemental oxygenation and bronchodilator  as needed  3. Monitor  kidney function and c/w adjusted doses of Levaquin and  Unasyn as per crcl  4. Wound care as per Podiatry  5. Aspiration precaution  6. May change to Zosyn and oral fluconazole on discharge to continue until 11/3/21 to cover Osteomyelitis    d/w ICU team     Attending Attestation:    Spent more than 45 minutes on total encounter, more than 50 % of the visit was spent counseling and/or coordinating care by the Attending physician.

## 2021-10-05 NOTE — PROGRESS NOTE ADULT - PROBLEM SELECTOR PLAN 11
Continue antibiotics thru 11/3 as per ID  Discharge planning started.  Continue wound vac as per Podiatry. Continue antibiotics thru 11/3 as per ID  Discharge planning started.  Continue wound vac as per Podiatry.  discussed the current condition with poss outcome with daughter abril today, full code, asked to be call if he needs to have thoracentesis

## 2021-10-05 NOTE — PROGRESS NOTE ADULT - ASSESSMENT
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 s/p OR debridement today. Nephrology consulted for abrupt rise in SCr.     1. OREN- likely ATN in the setting of infection and ACEi use. Lisinopril discontinued 9/22. Again pt with coarse BS, crackles, CXR 10/2 c/w pulm edema.  s/p Furosemide on 10/1, 10/2 and 10/4; with no improvement; recc CT chest without contrast to r/o PNA.  Check urine lytes  ATN was resolving, however renal fxn now worsened. Kidney/ Bladder US with large distended bladder s/p ibrahim placement on 9/23, then removed. s/p bladder scan 10/4 with 1L urine for which ibrahim was reinserted; however; renal function did not improve post ibrahim; less likely due to obstructive uropathy.  No peripheral eosinophilia- no signs of AIN. C3 & C4 wnl with neg ASO- no signs of PIGN. Strict I/Os. Avoid nephrotoxins/ NSAIDs/ RCA. Monitor BMP.    2. 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 Will defer secondary w/u as an outpt. Avoid nephrotoxins  3. HTN 2/2 CKD- BP acceptable. Lisinopril d/c due to OREN. c/w Nifedipine ER 60mg PO qd. Monitor BP  4. Acute osteomyelitis- s/p debridement 9/22. Pt on Unasyn & Levaquin. Plan as per ID      [facility] NEPHROLOGY  Bryn Johnson M.D.  Domingo Booth D.O.  Zakia Rodriguez M.D.  Zonia Adams, MSN, ANP-C  [phone]    [address] [place] [address]

## 2021-10-05 NOTE — CONSULT NOTE ADULT - ATTENDING COMMENTS
- Acute Hypoxic Respiratory Failure - placed on NIPPV for work of breathing  - Sepsis 2/2 to Aspiration PNA vs R foot OM - continue broad spectrum abx   - BL effusions R>L - CT surg for thoracentesis and send pelural fluid studies for microbiology  - OREN superimposed on CKD - 2/2 ATN vs cardiorenal on diuresis  - Severe AS - continue diuresis with careful monitoring of volume status/fluid balance  - CAD s/p CABG - continue anti PLT  - Cholelithiasis   - HTN  - HLD    Supportive care including pain control, DVT prophylaxis, optimize enteral nutrition   Transfer ICU

## 2021-10-05 NOTE — CONSULT NOTE ADULT - SUBJECTIVE AND OBJECTIVE BOX
Patient is a 76y old  Male who presents with a chief complaint of R Foot Pain (05 Oct 2021 17:10)      HPI:  76M from home, lives with daughter w/ PMH DM on insulin, HTN, HLD, CAD, 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 Pt with sharp pain on the R lateral foot. As per daughter, pt was taking tylenol which did not help his pain prompting the patient to ask his daughter to take him to the hospital. Pt is a poor historian. Daughter states that the patient did not see his podiatrist after the surgery. No fever, chest, pain, palpitations, nausea, vomiting, diarrhea (17 Sep 2021 01:11)      PAST MEDICAL & SURGICAL HISTORY:  HTN (hypertension)    HLD (hyperlipidemia)    DM (diabetes mellitus)    CAD (coronary artery disease)    Glaucoma, angle-closure    [place] (hard of hearing)    S/P CABG (coronary artery bypass graft)    History of thyroid surgery        SOCIAL HX:   Smoking                         ETOH                            Other    FAMILY HISTORY:  Family history of acute myocardial infarction (Father)    Family history of diabetes mellitus (Mother, Sibling)    Family history of hypertension (Mother, Sibling)    Family history of hyperlipidemia (Mother, Sibling)    :  No known cardiovacular family hisotry     ROS:  See HPI     Allergies    No Known Allergies    Intolerances          PHYSICAL EXAM    ICU Vital Signs Last 24 Hrs  T(C): 36.3 (05 Oct 2021 18:26), Max: 36.4 (05 Oct 2021 05:15)  T(F): 97.4 (05 Oct 2021 18:26), Max: 97.5 (05 Oct 2021 05:15)  HR: 72 (05 Oct 2021 18:26) (66 - 75)  BP: 118/55 (05 Oct 2021 18:26) (116/55 - 123/55)  BP(mean): --  ABP: --  ABP(mean): --  RR: 20 (05 Oct 2021 18:26) (17 - 20)  SpO2: 88% (05 Oct 2021 18:37) (85% - 95%)      General: Not in distress  HEENT:  MAMTA              Lymphatic system: No LN  Lungs: Bilateral BS  Cardiovascular: Regular  Gastrointestinal: Soft, Positive BS  Musculoskeletal: No clubbing.  Moves all extremities.    Skin: Warm.  Intact  Neurological: No motor or sensory deficit       10-04-21 @ 07:01  -  10-05-21 @ 07:00  --------------------------------------------------------  IN:  Total IN: 0 mL    OUT:    Indwelling Catheter - Urethral (mL): 200 mL    Voided (mL): 700 mL  Total OUT: 900 mL    Total NET: -900 mL      10-05-21 @ 07:01  -  10-05-21 @ 19:02  --------------------------------------------------------  IN:  Total IN: 0 mL    OUT:    Voided (mL): 250 mL  Total OUT: 250 mL    Total NET: -250 mL          LABS:                          10.0   7.73  )-----------( 332      ( 05 Oct 2021 06:43 )             30.3                                               10-05    143  |  105  |  27<H>  ----------------------------<  111<H>  3.8   |  24  |  2.17<H>    Ca    8.8      05 Oct 2021 06:43  Phos  4.0     10-05  Mg     2.4     10-05    TPro  7.2  /  Alb  2.5<L>  /  TBili  0.5  /  DBili  x   /  AST  28  /  ALT  18  /  AlkPhos  108  10-05                                                                                           LIVER FUNCTIONS - ( 05 Oct 2021 06:43 )  Alb: 2.5 g/dL / Pro: 7.2 g/dL / ALK PHOS: 108 U/L / ALT: 18 U/L DA / AST: 28 U/L / GGT: x                                                                                                                                   ABG - ( 05 Oct 2021 18:15 )  pH, Arterial: 7.49  pH, Blood: x     /  pCO2: 33    /  pO2: 52    / HCO3: 25    / Base Excess: 2.2   /  SaO2: 86                  CXR:    ECHO:    MEDICATIONS  (STANDING):  albumin human 25% IVPB 50 milliLiter(s) IV Intermittent every 6 hours  ampicillin/sulbactam  IVPB 3 Gram(s) IV Intermittent every 6 hours  aspirin  chewable 81 milliGRAM(s) Oral daily  atorvastatin 80 milliGRAM(s) Oral at bedtime  bisacodyl Suppository 10 milliGRAM(s) Rectal once  chlorhexidine 2% Cloths 1 Application(s) Topical <User Schedule>  cyanocobalamin 1000 MICROGram(s) Oral daily  ergocalciferol 12784 Unit(s) Oral <User Schedule>  ferrous    sulfate 325 milliGRAM(s) Oral daily  finasteride 5 milliGRAM(s) Oral daily  fluconAZOLE IVPB      fluconAZOLE IVPB 100 milliGRAM(s) IV Intermittent every 24 hours  furosemide   Injectable 40 milliGRAM(s) IV Push every 6 hours  heparin   Injectable 5000 Unit(s) SubCutaneous every 8 hours  influenza   Vaccine 0.5 milliLiter(s) IntraMuscular once  insulin lispro (ADMELOG) corrective regimen sliding scale   SubCutaneous Before meals and at bedtime  levoFLOXacin  Tablet 250 milliGRAM(s) Oral every 24 hours  metoprolol succinate  milliGRAM(s) Oral daily  mirtazapine 7.5 milliGRAM(s) Oral daily  NIFEdipine XL 60 milliGRAM(s) Oral daily  pantoprazole    Tablet 40 milliGRAM(s) Oral before breakfast  polyethylene glycol 3350 17 Gram(s) Oral daily  senna 2 Tablet(s) Oral at bedtime  tamsulosin 0.4 milliGRAM(s) Oral at bedtime    MEDICATIONS  (PRN):  ondansetron Injectable 4 milliGRAM(s) IV Push three times a day PRN Nausea and/or Vomiting  sodium chloride 0.9% lock flush 10 milliLiter(s) IV Push every 1 hour PRN Pre/post blood products, medications, blood draw, and to maintain line patency         Patient is a 76y old  Male who presents with a chief complaint of R Foot Pain (05 Oct 2021 17:10)      HPI:  Patient is a 76 year old male with PMH of poorly controlled IDDM, HTN, HLD, stage III CKD, CAD s/p CABG and recent right partial 5th foot amputation (8/19/21) who presented to ED with c/o right foot pain associated with discharge and foul odor. Of note, the patient never followed up with podiatry after his procedure in August. As per daughter, pt was taking tylenol which did not help his pain prompting the patient to ask his daughter to take him to the hospital. Pt is a poor historian but baseline . Daughter states that the patient did not see his podiatrist after the surgery. No fever, chest, pain, palpitations, nausea, vomiting, diarrhea (17 Sep 2021 01:11)    INTERVAL HPI/ HOSPITAL COURSE   MRI confirms suspected osteomyelitis of the R foot - patient followed by podiatry and ID; and underwent debridement and wound VAC placement on R foot. He was started on Unasyn and Levofloxacin as per ID. Surgical pathology resulted OM, and wound culture resulting Enterococcus Aeromonal and Klebsiella- treated initially with Zosyn. He developed OREN likely mixed 2/2 to a mixed etiology with prerenal from active infection/ pulmonary edema, intrarenal due to toxicity from IV antibiotics and post renal from urinary retention, Nephrology consulted for optimization of kidney function. IV antibiotic regimen switched to Unasyn and Levaquin, sensitive for organism, will need 6 weeks of IV antibiotics. Patient subsequently treated for Pulmonary Edema with IV lasix, Cardiology recommending SABIHA with L/R heart cath once infection clears and medically stable. Hospital course further complicated by abdominal distention and constipation for which he received lactulose and vomited. The patient had subsequent respiratory distress and it is suspected he aspirated as CXR showed possible RLL PNA. AXR shows distended loops of bowel for which NG tube was placed which the patient removed overnight 9/26 - re-attempts to replace NG tube were unsuccessful as patient was non-cooperative. Surgery consulted. CT abdomen deferred for now as patient is unstable and will not tolerate lying flat. ICU consulted given patient's deterioration in clinical status.  today pt noted to have low O2 saturation on 2L N-[place], will get urgent CT chest to r/o pul edema vs Pneumonia. pending DC to [facility], per CM , facility requires wound vac to be delivered prior to pt transferring to facility. also, pt with declined in appetite, reporting some frustration due to extended hospital stay, starting remeron 7.5mg QD and will provide emotional support. Will consider pulm consult after reviewing CT chest, CT chest with  large b/l pleural effusion and PNA, DR. Xavier consulted for further mgmt,  will start albumin + lasxi tx today and  will re-eval pt in AM then if needed thoracentesis           PAST MEDICAL & SURGICAL HISTORY:  HTN (hypertension)    HLD (hyperlipidemia)    DM (diabetes mellitus)    CAD (coronary artery disease)    Glaucoma, angle-closure    [place] (hard of hearing)    S/P CABG (coronary artery bypass graft)    History of thyroid surgery        SOCIAL HX:   Smoking                         ETOH                            Other    FAMILY HISTORY:  Family history of acute myocardial infarction (Father)    Family history of diabetes mellitus (Mother, Sibling)    Family history of hypertension (Mother, Sibling)    Family history of hyperlipidemia (Mother, Sibling)    :  No known cardiovacular family hisotry     ROS:  See HPI     Allergies    No Known Allergies    Intolerances          PHYSICAL EXAM    ICU Vital Signs Last 24 Hrs  T(C): 36.3 (05 Oct 2021 18:26), Max: 36.4 (05 Oct 2021 05:15)  T(F): 97.4 (05 Oct 2021 18:26), Max: 97.5 (05 Oct 2021 05:15)  HR: 72 (05 Oct 2021 18:26) (66 - 75)  BP: 118/55 (05 Oct 2021 18:26) (116/55 - 123/55)  BP(mean): --  ABP: --  ABP(mean): --  RR: 20 (05 Oct 2021 18:26) (17 - 20)  SpO2: 88% (05 Oct 2021 18:37) (85% - 95%)      General: Not in distress  HEENT:  MAMTA              Lymphatic system: No LN  Lungs: Bilateral BS  Cardiovascular: Regular  Gastrointestinal: Soft, Positive BS  Musculoskeletal: No clubbing.  Moves all extremities.    Skin: Warm.  Intact  Neurological: No motor or sensory deficit       10-04-21 @ 07:01  -  10-05-21 @ 07:00  --------------------------------------------------------  IN:  Total IN: 0 mL    OUT:    Indwelling Catheter - Urethral (mL): 200 mL    Voided (mL): 700 mL  Total OUT: 900 mL    Total NET: -900 mL      10-05-21 @ 07:01  -  10-05-21 @ 19:02  --------------------------------------------------------  IN:  Total IN: 0 mL    OUT:    Voided (mL): 250 mL  Total OUT: 250 mL    Total NET: -250 mL          LABS:                          10.0   7.73  )-----------( 332      ( 05 Oct 2021 06:43 )             30.3                                               10-05    143  |  105  |  27<H>  ----------------------------<  111<H>  3.8   |  24  |  2.17<H>    Ca    8.8      05 Oct 2021 06:43  Phos  4.0     10-05  Mg     2.4     10-05    TPro  7.2  /  Alb  2.5<L>  /  TBili  0.5  /  DBili  x   /  AST  28  /  ALT  18  /  AlkPhos  108  10-05                                                                                           LIVER FUNCTIONS - ( 05 Oct 2021 06:43 )  Alb: 2.5 g/dL / Pro: 7.2 g/dL / ALK PHOS: 108 U/L / ALT: 18 U/L DA / AST: 28 U/L / GGT: x                                                                                                                                   ABG - ( 05 Oct 2021 18:15 )  pH, Arterial: 7.49  pH, Blood: x     /  pCO2: 33    /  pO2: 52    / HCO3: 25    / Base Excess: 2.2   /  SaO2: 86                  CXR:    ECHO:    MEDICATIONS  (STANDING):  albumin human 25% IVPB 50 milliLiter(s) IV Intermittent every 6 hours  ampicillin/sulbactam  IVPB 3 Gram(s) IV Intermittent every 6 hours  aspirin  chewable 81 milliGRAM(s) Oral daily  atorvastatin 80 milliGRAM(s) Oral at bedtime  bisacodyl Suppository 10 milliGRAM(s) Rectal once  chlorhexidine 2% Cloths 1 Application(s) Topical <User Schedule>  cyanocobalamin 1000 MICROGram(s) Oral daily  ergocalciferol 57139 Unit(s) Oral <User Schedule>  ferrous    sulfate 325 milliGRAM(s) Oral daily  finasteride 5 milliGRAM(s) Oral daily  fluconAZOLE IVPB      fluconAZOLE IVPB 100 milliGRAM(s) IV Intermittent every 24 hours  furosemide   Injectable 40 milliGRAM(s) IV Push every 6 hours  heparin   Injectable 5000 Unit(s) SubCutaneous every 8 hours  influenza   Vaccine 0.5 milliLiter(s) IntraMuscular once  insulin lispro (ADMELOG) corrective regimen sliding scale   SubCutaneous Before meals and at bedtime  levoFLOXacin  Tablet 250 milliGRAM(s) Oral every 24 hours  metoprolol succinate  milliGRAM(s) Oral daily  mirtazapine 7.5 milliGRAM(s) Oral daily  NIFEdipine XL 60 milliGRAM(s) Oral daily  pantoprazole    Tablet 40 milliGRAM(s) Oral before breakfast  polyethylene glycol 3350 17 Gram(s) Oral daily  senna 2 Tablet(s) Oral at bedtime  tamsulosin 0.4 milliGRAM(s) Oral at bedtime    MEDICATIONS  (PRN):  ondansetron Injectable 4 milliGRAM(s) IV Push three times a day PRN Nausea and/or Vomiting  sodium chloride 0.9% lock flush 10 milliLiter(s) IV Push every 1 hour PRN Pre/post blood products, medications, blood draw, and to maintain line patency         Patient is a 76y old  Male who presents with a chief complaint of R Foot Pain (05 Oct 2021 17:10)      HPI:  Patient is a 76 year old male with PMH of poorly controlled IDDM, HTN, HLD, stage III CKD, CAD s/p CABG and recent right partial 5th foot amputation (8/19/21) who presented to ED with c/o right foot pain associated with discharge and foul odor. Of note, the patient never followed up with podiatry after his procedure in August. As per daughter, pt was taking tylenol which did not help his pain prompting the patient to ask his daughter to take him to the hospital. Pt is a poor historian but baseline . Daughter states that the patient did not see his podiatrist after the surgery. No fever, chest, pain, palpitations, nausea, vomiting, diarrhea (17 Sep 2021 01:11)    INTERVAL HPI/ HOSPITAL COURSE   MRI confirms suspected osteomyelitis of the R foot - patient followed by podiatry and ID; and underwent debridement and wound VAC placement on R foot. He was started on Unasyn and Levofloxacin as per ID. Surgical pathology resulted OM, and wound culture resulting Enterococcus Aeromonal and Klebsiella- treated initially with Zosyn. He developed OREN likely mixed 2/2 to a mixed etiology with prerenal from active infection/ pulmonary edema, intrarenal due to toxicity from IV antibiotics and post renal from urinary retention, Nephrology consulted for optimization of kidney function. IV antibiotic regimen switched to Unasyn and Levaquin, sensitive for cultured organisms; requiring 6 weeks of IV antibiotics. Patient subsequently developed Pulmonary Edema and was treated with IV lasix. Cardiology consulted and recommending SABIHA with L/R heart cath once infection clears and medically stable. Hospital course further complicated by abdominal distention and constipation for which he received lactulose and had vomiting. AXR shows distended loops of bowel for which NG tube was placed which the patient removed overnight 9/26 - re-attempts to replace NG tube were unsuccessful as patient was non-cooperative. The patient had subsequent worsening respiratory distress and is suspected to have aspirated as CXR showed possible RLL PNA. Surgery consulted. CT abdomen deferred for now as patient is unstable and will not tolerate lying flat. CT chest done showing R>L pulmonary edema vs Pneumonia; patient saturating high 80%'s Spo2 on 10L [place]. Pulm (DR. Xavier) consulted for possible thoracentesis, and recommended to start albumin + lasix. ICU was consulted for worsening respiratory status. Patient denies any current trouble breathing, chest pain, or cough.         PAST MEDICAL & SURGICAL HISTORY:  HTN (hypertension)    HLD (hyperlipidemia)    DM (diabetes mellitus)    CAD (coronary artery disease)    Glaucoma, angle-closure    [place] (hard of hearing)    S/P CABG (coronary artery bypass graft)    History of thyroid surgery        SOCIAL HX:   Smoking                         ETOH                            Other    FAMILY HISTORY:  Family history of acute myocardial infarction (Father)    Family history of diabetes mellitus (Mother, Sibling)    Family history of hypertension (Mother, Sibling)    Family history of hyperlipidemia (Mother, Sibling)    :  No known cardiovacular family hisotry     ROS:  See HPI     Allergies    No Known Allergies    Intolerances          PHYSICAL EXAM    ICU Vital Signs Last 24 Hrs  T(C): 36.3 (05 Oct 2021 18:26), Max: 36.4 (05 Oct 2021 05:15)  T(F): 97.4 (05 Oct 2021 18:26), Max: 97.5 (05 Oct 2021 05:15)  HR: 72 (05 Oct 2021 18:26) (66 - 75)  BP: 118/55 (05 Oct 2021 18:26) (116/55 - 123/55)  BP(mean): --  ABP: --  ABP(mean): --  RR: 20 (05 Oct 2021 18:26) (17 - 20)  SpO2: 88% (05 Oct 2021 18:37) (85% - 95%)      General: Not in distress  HEENT:  MAMTA              Lymphatic system: No LN  Lungs: Bilateral BS  Cardiovascular: Regular  Gastrointestinal: Soft, Positive BS  Musculoskeletal: No clubbing.  Moves all extremities.    Skin: Warm.  Intact  Neurological: No motor or sensory deficit       10-04-21 @ 07:01  -  10-05-21 @ 07:00  --------------------------------------------------------  IN:  Total IN: 0 mL    OUT:    Indwelling Catheter - Urethral (mL): 200 mL    Voided (mL): 700 mL  Total OUT: 900 mL    Total NET: -900 mL      10-05-21 @ 07:01  -  10-05-21 @ 19:02  --------------------------------------------------------  IN:  Total IN: 0 mL    OUT:    Voided (mL): 250 mL  Total OUT: 250 mL    Total NET: -250 mL          LABS:                          10.0   7.73  )-----------( 332      ( 05 Oct 2021 06:43 )             30.3                                               10-05    143  |  105  |  27<H>  ----------------------------<  111<H>  3.8   |  24  |  2.17<H>    Ca    8.8      05 Oct 2021 06:43  Phos  4.0     10-05  Mg     2.4     10-05    TPro  7.2  /  Alb  2.5<L>  /  TBili  0.5  /  DBili  x   /  AST  28  /  ALT  18  /  AlkPhos  108  10-05                                                                                           LIVER FUNCTIONS - ( 05 Oct 2021 06:43 )  Alb: 2.5 g/dL / Pro: 7.2 g/dL / ALK PHOS: 108 U/L / ALT: 18 U/L DA / AST: 28 U/L / GGT: x                                                                                                                                   ABG - ( 05 Oct 2021 18:15 )  pH, Arterial: 7.49  pH, Blood: x     /  pCO2: 33    /  pO2: 52    / HCO3: 25    / Base Excess: 2.2   /  SaO2: 86                  CXR:    ECHO:    MEDICATIONS  (STANDING):  albumin human 25% IVPB 50 milliLiter(s) IV Intermittent every 6 hours  ampicillin/sulbactam  IVPB 3 Gram(s) IV Intermittent every 6 hours  aspirin  chewable 81 milliGRAM(s) Oral daily  atorvastatin 80 milliGRAM(s) Oral at bedtime  bisacodyl Suppository 10 milliGRAM(s) Rectal once  chlorhexidine 2% Cloths 1 Application(s) Topical <User Schedule>  cyanocobalamin 1000 MICROGram(s) Oral daily  ergocalciferol 76804 Unit(s) Oral <User Schedule>  ferrous    sulfate 325 milliGRAM(s) Oral daily  finasteride 5 milliGRAM(s) Oral daily  fluconAZOLE IVPB      fluconAZOLE IVPB 100 milliGRAM(s) IV Intermittent every 24 hours  furosemide   Injectable 40 milliGRAM(s) IV Push every 6 hours  heparin   Injectable 5000 Unit(s) SubCutaneous every 8 hours  influenza   Vaccine 0.5 milliLiter(s) IntraMuscular once  insulin lispro (ADMELOG) corrective regimen sliding scale   SubCutaneous Before meals and at bedtime  levoFLOXacin  Tablet 250 milliGRAM(s) Oral every 24 hours  metoprolol succinate  milliGRAM(s) Oral daily  mirtazapine 7.5 milliGRAM(s) Oral daily  NIFEdipine XL 60 milliGRAM(s) Oral daily  pantoprazole    Tablet 40 milliGRAM(s) Oral before breakfast  polyethylene glycol 3350 17 Gram(s) Oral daily  senna 2 Tablet(s) Oral at bedtime  tamsulosin 0.4 milliGRAM(s) Oral at bedtime    MEDICATIONS  (PRN):  ondansetron Injectable 4 milliGRAM(s) IV Push three times a day PRN Nausea and/or Vomiting  sodium chloride 0.9% lock flush 10 milliLiter(s) IV Push every 1 hour PRN Pre/post blood products, medications, blood draw, and to maintain line patency

## 2021-10-06 LAB
ALBUMIN SERPL ELPH-MCNC: 2.5 G/DL — LOW (ref 3.5–5)
ALP SERPL-CCNC: 97 U/L — SIGNIFICANT CHANGE UP (ref 40–120)
ALT FLD-CCNC: 16 U/L DA — SIGNIFICANT CHANGE UP (ref 10–60)
ANION GAP SERPL CALC-SCNC: 16 MMOL/L — SIGNIFICANT CHANGE UP (ref 5–17)
AST SERPL-CCNC: 26 U/L — SIGNIFICANT CHANGE UP (ref 10–40)
BASE EXCESS BLDA CALC-SCNC: -0.3 MMOL/L — SIGNIFICANT CHANGE UP (ref -2–3)
BASOPHILS # BLD AUTO: 0.04 K/UL — SIGNIFICANT CHANGE UP (ref 0–0.2)
BASOPHILS NFR BLD AUTO: 0.6 % — SIGNIFICANT CHANGE UP (ref 0–2)
BILIRUB SERPL-MCNC: 0.6 MG/DL — SIGNIFICANT CHANGE UP (ref 0.2–1.2)
BLD GP AB SCN SERPL QL: SIGNIFICANT CHANGE UP
BLOOD GAS COMMENTS ARTERIAL: SIGNIFICANT CHANGE UP
BUN SERPL-MCNC: 27 MG/DL — HIGH (ref 7–18)
CALCIUM SERPL-MCNC: 8.5 MG/DL — SIGNIFICANT CHANGE UP (ref 8.4–10.5)
CHLORIDE SERPL-SCNC: 104 MMOL/L — SIGNIFICANT CHANGE UP (ref 96–108)
CO2 SERPL-SCNC: 24 MMOL/L — SIGNIFICANT CHANGE UP (ref 22–31)
CREAT SERPL-MCNC: 2.02 MG/DL — HIGH (ref 0.5–1.3)
EOSINOPHIL # BLD AUTO: 0.13 K/UL — SIGNIFICANT CHANGE UP (ref 0–0.5)
EOSINOPHIL NFR BLD AUTO: 1.8 % — SIGNIFICANT CHANGE UP (ref 0–6)
GLUCOSE BLDC GLUCOMTR-MCNC: 110 MG/DL — HIGH (ref 70–99)
GLUCOSE BLDC GLUCOMTR-MCNC: 115 MG/DL — HIGH (ref 70–99)
GLUCOSE BLDC GLUCOMTR-MCNC: 129 MG/DL — HIGH (ref 70–99)
GLUCOSE BLDC GLUCOMTR-MCNC: 132 MG/DL — HIGH (ref 70–99)
GLUCOSE BLDC GLUCOMTR-MCNC: 139 MG/DL — HIGH (ref 70–99)
GLUCOSE SERPL-MCNC: 96 MG/DL — SIGNIFICANT CHANGE UP (ref 70–99)
HCO3 BLDA-SCNC: 23 MMOL/L — SIGNIFICANT CHANGE UP (ref 21–28)
HCT VFR BLD CALC: 27.2 % — LOW (ref 39–50)
HGB BLD-MCNC: 8.9 G/DL — LOW (ref 13–17)
IMM GRANULOCYTES NFR BLD AUTO: 0.3 % — SIGNIFICANT CHANGE UP (ref 0–1.5)
INR BLD: 1.68 RATIO — HIGH (ref 0.88–1.16)
LYMPHOCYTES # BLD AUTO: 1.04 K/UL — SIGNIFICANT CHANGE UP (ref 1–3.3)
LYMPHOCYTES # BLD AUTO: 14.4 % — SIGNIFICANT CHANGE UP (ref 13–44)
MAGNESIUM SERPL-MCNC: 2.3 MG/DL — SIGNIFICANT CHANGE UP (ref 1.6–2.6)
MCHC RBC-ENTMCNC: 29.9 PG — SIGNIFICANT CHANGE UP (ref 27–34)
MCHC RBC-ENTMCNC: 32.7 GM/DL — SIGNIFICANT CHANGE UP (ref 32–36)
MCV RBC AUTO: 91.3 FL — SIGNIFICANT CHANGE UP (ref 80–100)
MONOCYTES # BLD AUTO: 0.49 K/UL — SIGNIFICANT CHANGE UP (ref 0–0.9)
MONOCYTES NFR BLD AUTO: 6.8 % — SIGNIFICANT CHANGE UP (ref 2–14)
MRSA PCR RESULT.: SIGNIFICANT CHANGE UP
NEUTROPHILS # BLD AUTO: 5.52 K/UL — SIGNIFICANT CHANGE UP (ref 1.8–7.4)
NEUTROPHILS NFR BLD AUTO: 76.1 % — SIGNIFICANT CHANGE UP (ref 43–77)
NRBC # BLD: 0 /100 WBCS — SIGNIFICANT CHANGE UP (ref 0–0)
NT-PROBNP SERPL-SCNC: 9223 PG/ML — HIGH (ref 0–450)
PCO2 BLDA: 32 MMHG — LOW (ref 35–48)
PH BLDA: 7.46 — HIGH (ref 7.35–7.45)
PHOSPHATE SERPL-MCNC: 3.8 MG/DL — SIGNIFICANT CHANGE UP (ref 2.5–4.5)
PLATELET # BLD AUTO: 280 K/UL — SIGNIFICANT CHANGE UP (ref 150–400)
PO2 BLDA: 56 MMHG — LOW (ref 83–108)
POTASSIUM SERPL-MCNC: 3.3 MMOL/L — LOW (ref 3.5–5.3)
POTASSIUM SERPL-SCNC: 3.3 MMOL/L — LOW (ref 3.5–5.3)
PROT SERPL-MCNC: 6.9 G/DL — SIGNIFICANT CHANGE UP (ref 6–8.3)
PROTHROM AB SERPL-ACNC: 19.5 SEC — HIGH (ref 10.6–13.6)
RBC # BLD: 2.98 M/UL — LOW (ref 4.2–5.8)
RBC # FLD: 14.4 % — SIGNIFICANT CHANGE UP (ref 10.3–14.5)
S AUREUS DNA NOSE QL NAA+PROBE: SIGNIFICANT CHANGE UP
SAO2 % BLDA: 89 % — SIGNIFICANT CHANGE UP
SARS-COV-2 RNA SPEC QL NAA+PROBE: SIGNIFICANT CHANGE UP
SODIUM SERPL-SCNC: 144 MMOL/L — SIGNIFICANT CHANGE UP (ref 135–145)
WBC # BLD: 7.24 K/UL — SIGNIFICANT CHANGE UP (ref 3.8–10.5)
WBC # FLD AUTO: 7.24 K/UL — SIGNIFICANT CHANGE UP (ref 3.8–10.5)

## 2021-10-06 PROCEDURE — 74019 RADEX ABDOMEN 2 VIEWS: CPT | Mod: 26

## 2021-10-06 PROCEDURE — 71045 X-RAY EXAM CHEST 1 VIEW: CPT | Mod: 26

## 2021-10-06 PROCEDURE — 74022 RADEX COMPL AQT ABD SERIES: CPT | Mod: 26

## 2021-10-06 PROCEDURE — 74230 X-RAY XM SWLNG FUNCJ C+: CPT | Mod: 26

## 2021-10-06 RX ORDER — POTASSIUM CHLORIDE 20 MEQ
10 PACKET (EA) ORAL
Refills: 0 | Status: COMPLETED | OUTPATIENT
Start: 2021-10-06 | End: 2021-10-06

## 2021-10-06 RX ORDER — PHYTONADIONE (VIT K1) 5 MG
10 TABLET ORAL ONCE
Refills: 0 | Status: COMPLETED | OUTPATIENT
Start: 2021-10-06 | End: 2021-10-06

## 2021-10-06 RX ORDER — POTASSIUM CHLORIDE 20 MEQ
40 PACKET (EA) ORAL ONCE
Refills: 0 | Status: DISCONTINUED | OUTPATIENT
Start: 2021-10-06 | End: 2021-10-06

## 2021-10-06 RX ADMIN — Medication 60 MILLIGRAM(S): at 05:37

## 2021-10-06 RX ADMIN — Medication 81 MILLIGRAM(S): at 12:18

## 2021-10-06 RX ADMIN — CHLORHEXIDINE GLUCONATE 1 APPLICATION(S): 213 SOLUTION TOPICAL at 05:37

## 2021-10-06 RX ADMIN — AMPICILLIN SODIUM AND SULBACTAM SODIUM 200 GRAM(S): 250; 125 INJECTION, POWDER, FOR SUSPENSION INTRAMUSCULAR; INTRAVENOUS at 00:22

## 2021-10-06 RX ADMIN — PANTOPRAZOLE SODIUM 40 MILLIGRAM(S): 20 TABLET, DELAYED RELEASE ORAL at 05:37

## 2021-10-06 RX ADMIN — Medication 50 MILLILITER(S): at 12:17

## 2021-10-06 RX ADMIN — FINASTERIDE 5 MILLIGRAM(S): 5 TABLET, FILM COATED ORAL at 12:18

## 2021-10-06 RX ADMIN — Medication 100 MILLIEQUIVALENT(S): at 09:43

## 2021-10-06 RX ADMIN — AMPICILLIN SODIUM AND SULBACTAM SODIUM 200 GRAM(S): 250; 125 INJECTION, POWDER, FOR SUSPENSION INTRAMUSCULAR; INTRAVENOUS at 23:36

## 2021-10-06 RX ADMIN — ATORVASTATIN CALCIUM 80 MILLIGRAM(S): 80 TABLET, FILM COATED ORAL at 21:41

## 2021-10-06 RX ADMIN — Medication 100 MILLIEQUIVALENT(S): at 10:20

## 2021-10-06 RX ADMIN — Medication 100 MILLIEQUIVALENT(S): at 11:27

## 2021-10-06 RX ADMIN — POLYETHYLENE GLYCOL 3350 17 GRAM(S): 17 POWDER, FOR SOLUTION ORAL at 12:18

## 2021-10-06 RX ADMIN — Medication 40 MILLIGRAM(S): at 12:17

## 2021-10-06 RX ADMIN — Medication 50 MILLILITER(S): at 23:35

## 2021-10-06 RX ADMIN — PREGABALIN 1000 MICROGRAM(S): 225 CAPSULE ORAL at 12:18

## 2021-10-06 RX ADMIN — FLUCONAZOLE 100 MILLIGRAM(S): 150 TABLET ORAL at 02:31

## 2021-10-06 RX ADMIN — AMPICILLIN SODIUM AND SULBACTAM SODIUM 200 GRAM(S): 250; 125 INJECTION, POWDER, FOR SUSPENSION INTRAMUSCULAR; INTRAVENOUS at 12:21

## 2021-10-06 RX ADMIN — Medication 50 MILLILITER(S): at 05:35

## 2021-10-06 RX ADMIN — AMPICILLIN SODIUM AND SULBACTAM SODIUM 200 GRAM(S): 250; 125 INJECTION, POWDER, FOR SUSPENSION INTRAMUSCULAR; INTRAVENOUS at 17:08

## 2021-10-06 RX ADMIN — ONDANSETRON 4 MILLIGRAM(S): 8 TABLET, FILM COATED ORAL at 12:44

## 2021-10-06 RX ADMIN — Medication 102 MILLIGRAM(S): at 17:53

## 2021-10-06 RX ADMIN — Medication 40 MILLIGRAM(S): at 17:56

## 2021-10-06 RX ADMIN — TAMSULOSIN HYDROCHLORIDE 0.4 MILLIGRAM(S): 0.4 CAPSULE ORAL at 21:41

## 2021-10-06 RX ADMIN — AMPICILLIN SODIUM AND SULBACTAM SODIUM 200 GRAM(S): 250; 125 INJECTION, POWDER, FOR SUSPENSION INTRAMUSCULAR; INTRAVENOUS at 05:34

## 2021-10-06 RX ADMIN — Medication 325 MILLIGRAM(S): at 12:18

## 2021-10-06 RX ADMIN — HEPARIN SODIUM 5000 UNIT(S): 5000 INJECTION INTRAVENOUS; SUBCUTANEOUS at 13:18

## 2021-10-06 RX ADMIN — Medication 50 MILLILITER(S): at 17:06

## 2021-10-06 RX ADMIN — Medication 40 MILLIGRAM(S): at 06:53

## 2021-10-06 RX ADMIN — Medication 40 MILLIGRAM(S): at 23:36

## 2021-10-06 RX ADMIN — Medication 40 MILLIGRAM(S): at 00:22

## 2021-10-06 NOTE — PROGRESS NOTE ADULT - ASSESSMENT
Assessment and Recommendation:   · Assessment	  Assessment:  - Acute Hypoxic Respiratory Failure  - Sepsis 2/2 to Aspiration PNA vs R foot OM   - OREN superimposed on CKD  - Severe AS   - CAD s/p CABG   - Cholelithiasis   - HTN  - HLD    Plan:  Neuro:  - Patient currently AA0x3, moving all 4 extremities to command   - avoid any sedating meds  - Was on Mirtazapine; will hold   - Monitor for AMS while on BIPAP    CVS:  # Severe Aortic Stenosis   - Echo confirmed EF 55-60%, Severe LVH, g1 diastolic dysfunction, and severe AS   - CXR showing improved R sided pleural effusion with diuresis   - CT Chest with pulm edema bilaterally; moderate to large pleural effusions; atelectasis vs PNA   - Likely causing acute on chronic CHF exacerbation   - BNP 19K with worsening overload as above; pt with poor cardiac reserve worsened by acute infection   - Avoid drugs which increase afterload     #CAD   - S/p CABG, on ASA   - Continue Toprol 100 Qd, Procardia 60XL  - C/w Lasix 40 IV mg Q6h, Albumin 100mg   - Positive trop 1.46->1.050; likely due to increased demand and renal failure.  - No active chest pain   - Echo As above   - C/w ASA, statin  - Will need a SABIHA and R+L heart cath once medically optimized  - Cardiology Dr. Franky Aceves:  - Acute Hypoxic/ Hypercapnic Resp Failure  - Patient on Pendant, saturating 90% on 10L, but having increased WOB, tachypnea   - Possible left lower lobe pneumonia and bilateral pleural effusions on CT Chest   - ABG showing 7.49/33/52/25-> respiratory alkalosis with hypoxic   - Bipap 10/5 for hypoxia and work of breathing  - patient and family understands he may need intubation if he worsens (FULL CODE)  - CT Surgery consulted for possible thoracentesis vs Chest tube   - Send fluid for tests/ cultures     ID:  - Empiric Aspiration PNA coverage, R foot osteomyelitis, and Funguria   - Blood Cultures -ve till date, Bone and wound culture: showing Aeromonas, GBS, Klebsiella, Enterococcus   - UCX showing yeast   - C/w Unasyn, Levaquin, Diflucan; renally dosed     Nephro:  - Ibrahim placed for possible obstruction   - OREN on CKD: ATN was resolving, however renal fxn now worsened.   - Kidney/ Bladder US with large distended bladder s/p ibrahim placement on 9/23, then removed.   - S/p bladder scan 10/4 with 1L urine for which Ibrahim was reinserted  - FeUrea: 20.5% 9//23: pre renal   - Less likely due to obstructive uropathy.  No peripheral eosinophilia- no signs of AIN.   - Avoid nephrotoxins/ NSAIDs/ RCA. Monitor BMP.  - Nephrology Consulted Dr. Rodriguez     GI:  - NPO until off bipap  - Had an Aspiration event after NG tube placed and subsequently removed by pt  - Cholelithiasis noted on RUQ US   - Pt continues to have constipation/ c/w bowel regimen: monitor Stool Count   - Surgery consulted; no acute intervention  - Aspiration precautions     Heme:  - no indication for transfusion currently  - admitted with Hb 12.7 in August; now 8.8  - No active bleeding, normocytic   - Fe panel shows deficiency; will hold repletion during acute infection  - Likely component of ACD  - Tranfuse if Hb<7 or if worsening tachy/HF sx     Endo:  - target CBG < 180  - C/w Sliding Scale, accuchecks ACHS     Prophy:  - HSQ   - PPI Qd     Dispo:  - monitor in the ICU until off bipap  PT FULL CODE - confirmed w/ Daughter Mrs. Bell Pj        Assessment and Recommendation:   · Assessment	  Assessment:  - Acute Hypoxic Respiratory Failure  - Sepsis 2/2 to Aspiration PNA vs R foot OM   - OREN superimposed on CKD  - Severe AS   - CAD s/p CABG   - Cholelithiasis   - HTN  - HLD    Plan:  Neuro:  - Patient currently AA0x3, moving all 4 extremities to command   - avoid any sedating meds  - Was on Mirtazapine; will hold   - Monitor for AMS while on BIPAP    CVS:  # Severe Aortic Stenosis   - Echo 8/15  confirmed EF 55-60%, Severe LVH, g1 diastolic dysfunction, and severe AS   -Murmurs on exam, systolic  - CT Chest with pulm edema bilaterally; moderate to large pleural effusions; atelectasis vs PNA   - BNP 9K with worsening overload as above; pt with poor cardiac reserve worsened by acute infection   - CXR  this admission showing improved R sided pleural effusion with diuresis   - Likely causing acute on chronic CHF exacerbation   - Avoid drugs which increase afterload   -F/u new Echo new EF  -40IV lasix Q6      #CAD   - S/p CABG, on ASA   - Continue Toprol 100 Qd, Procardia 60XL  - C/w Lasix 40 IV mg Q6h, Albumin 100mg   - Positive trop 1.46->1.550; likely due to increased demand and renal failure.  -Now down trended 1.05  - No active chest pain   - Echo As above, will follow new or POCUS  - Will need a SABIHA and R+L heart cath once medically optimized  - C/w ASA, statin  - Cardiology Dr. Franky Aceves:  - Acute Hypoxic/ Hypercapnic Resp Failure  - Patient on Pendant, saturating 90% on 10L pendant, but having increased WOB, tachypnea   - Possible left lower lobe pneumonia and bilateral pleural effusions on CT Chest   - ABG showing 7.49/33/52/25-> respiratory alkalosis with hypoxic   - Bipap 10/5 for hypoxia and work of breathing  - patient and family understands he may need intubation if he worsens (FULL CODE)  -Has not used Bipap overnight, sats normal on 8L   - CT Surgery consulted for possible thoracentesis vs Chest tube unable to find pocket  -IR consulted for CT guided pig tail.  - Send fluid for tests/ cultures     ID:  - Empiric Aspiration PNA coverage, R foot osteomyelitis, and Funguria   - Blood Cultures -ve till date, Bone and wound culture: showing Aeromonas, GBS, Klebsiella, Enterococcus   -Cultures from 9/22/2021, tissue and blood 9/17 negative  - UCX showing yeast - one source  - C/w Unasyn, Levaquin, Diflucan; renally dosed   -May DC diflucan as it is only one source    Nephro:  - Ibrahim placed for possible obstruction   - OREN on CKD stage III: ATN was resolving, however renal fxn now worsened.   - Kidney/ Bladder US with large distended bladder s/p ibrahim placement on 9/23, then removed.   - S/p bladder scan 10/4 with 1L urine for which Ibrahim was reinserted  - FeUrea: 20.5% 9/23: pre renal   -Cr 2.02 down trended  - Less likely due to obstructive uropathy.  No peripheral eosinophilia- no signs of AIN.   - Avoid nephrotoxins/ NSAIDs/ RCA. Monitor BMP.  - Nephrology Consulted Dr. Rodriguez     GI:  - NPO until off bipap  -RLL likely aspiration  - Had an Aspiration event after NG tube placed and subsequently removed by pt  - Aspiration precautions,  - Cholelithiasis noted on RUQ US   - Pt continues to have constipation/ c/w bowel regimen: monitor Stool Count   - Surgery consulted; no acute intervention  -CT tomorrow, may try saline enema, if ileus persists naloxagol  - f/u enhanced speech and swallow eval today    Heme:  - no indication for transfusion currently  - admitted with Hb 12.7 in August; now 8.8  - No active bleeding, normocytic   - Fe panel shows deficiency; will hold repletion during acute infection  - Likely component of ACD  - Tranfuse if Hb<7 or if worsening tachy/HF sx     Endo:  - target CBG < 180 controlled  - C/w Sliding Scale, accuchecks ACHS     Prophy:  - HSQ   - PPI Qd     Dispo:  - monitor in the ICU until off bipap  PT FULL CODE - confirmed w/ Daughter Mrs. Arabella Oliva        Assessment and Recommendation:   · Assessment	  Assessment:  - Acute Hypoxic Respiratory Failure  - Sepsis 2/2 to Aspiration PNA vs R foot OM   - OREN superimposed on CKD  - Severe AS   - CAD s/p CABG   - Cholelithiasis   - HTN  - HLD    Plan:  Neuro:  - Patient currently AA0x3, moving all 4 extremities to command   - avoid any sedating meds  - Was on Mirtazapine; will hold   - Monitor for AMS while on BIPAP    CVS:  # Severe Aortic Stenosis   - Echo 8/15  confirmed EF 55-60%, Severe LVH, g1 diastolic dysfunction, and severe AS   -Murmurs on exam, systolic  - CT Chest with pulm edema bilaterally; moderate to large pleural effusions; atelectasis vs PNA   - BNP 19K with worsening overload as above; 9K today pt with poor cardiac reserve worsened by acute infection   - CXR  this admission showing improved R sided pleural effusion with diuresis   - Likely causing acute on chronic CHF exacerbation   - Avoid drugs which increase afterload   -F/u new Echo new EF  -40IV lasix Q6      #CAD   - S/p CABG, on ASA   - Continue Toprol 100 Qd, Procardia 60XL  - C/w Lasix 40 IV mg Q6h, Albumin 100mg   - Positive trop 1.46->1.550; likely due to increased demand and renal failure.  -Now down trended 1.05  - No active chest pain   - Echo As above, will follow new or POCUS  - Will need a SABIHA and R+L heart cath once medically optimized  - C/w ASA, statin  - Cardiology Dr. Franky Aceves:  - Acute Hypoxic/ Hypercapnic Resp Failure  - Patient on Pendant, saturating 90% on 10L pendant, but having increased WOB, tachypnea   - Possible left lower lobe pneumonia and bilateral pleural effusions on CT Chest   - ABG showing 7.49/33/52/25-> respiratory alkalosis with hypoxic   - Bipap 10/5 for hypoxia and work of breathing  - patient and family understands he may need intubation if he worsens (FULL CODE)  -Has not used Bipap overnight, sats normal on 8L   - CT Surgery consulted for possible thoracentesis vs Chest tube unable to find pocket  -IR consulted for CT guided pig tail.  - Send fluid for tests/ cultures     ID:  - Empiric Aspiration PNA coverage, R foot osteomyelitis, and Funguria   - Blood Cultures -ve till date, Bone and wound culture: showing Aeromonas, GBS, Klebsiella, Enterococcus   -Cultures from 9/22/2021, tissue and blood 9/17 negative  - UCX showing yeast - one source  - C/w Unasyn, Levaquin, Diflucan; renally dosed   -May DC diflucan as it is only one source    Nephro:  - Ibrahim placed for possible obstruction   - OREN on CKD stage III: ATN was resolving, however renal fxn now worsened.   - Kidney/ Bladder US with large distended bladder s/p ibrahim placement on 9/23, then removed.   - S/p bladder scan 10/4 with 1L urine for which Ibrahim was reinserted  - FeUrea: 20.5% 9/23: pre renal   -Cr 2.02 down trended  - Less likely due to obstructive uropathy.  No peripheral eosinophilia- no signs of AIN.   - Avoid nephrotoxins/ NSAIDs/ RCA. Monitor BMP.  - Nephrology Consulted Dr. Rodriguez     GI:  - NPO until off bipap  -RLL likely aspiration  - Had an Aspiration event after NG tube placed and subsequently removed by pt  - Aspiration precautions,  - Cholelithiasis noted on RUQ US   - Pt continues to have constipation/ c/w bowel regimen: monitor Stool Count   - Surgery consulted; no acute intervention  -CT tomorrow, may try saline enema, if ileus persists naloxagol  - f/u enhanced speech and swallow eval today    Heme:  - no indication for transfusion currently  - admitted with Hb 12.7 in August; now 8.8  - No active bleeding, normocytic   - Fe panel shows deficiency; will hold repletion during acute infection  - Likely component of ACD  - Tranfuse if Hb<7 or if worsening tachy/HF sx     Endo:  - target CBG < 180 controlled  - C/w Sliding Scale, accuchecks ACHS     Prophy:  - HSQ   - PPI Qd     Dispo:  - monitor in the ICU until off bipap  PT FULL CODE - confirmed w/ Daughter Mrs. Arabella Oliva

## 2021-10-06 NOTE — SWALLOW VFSS/MBS ASSESSMENT ADULT - ORAL PHASE
Delayed oral transit time/Reduced anterior - posterior transport/Residue in oral cavity/Incomplete tongue to palate contact/Uncontrolled bolus / spillover in lisa-pharynx Delayed oral transit time/Reduced anterior - posterior transport/Residue in oral cavity/Incomplete tongue to palate contact/Uncontrolled bolus / spillover in lisa-pharynx/Uncontrolled bolus / spillover in hypopharynx Delayed oral transit time/Reduced anterior - posterior transport/Residue in oral cavity/Incomplete tongue to palate contact trace oral residue; Uncontrolled spillage to pyriform sinuses only on cup sip/Incomplete tongue to palate contact/Uncontrolled bolus / spillover in lisa-pharynx

## 2021-10-06 NOTE — PROGRESS NOTE ADULT - SUBJECTIVE AND OBJECTIVE BOX
Patient is a 76y old  Male who presents with a chief complaint of R Foot Pain (05 Oct 2021 17:10)      HPI:  Patient is a 76 year old male  with PMH of poorly controlled IDDM Hgb A1C 11.4, HTN, HLD, stage III CKD, CAD s/p CABG and recent partial amputation to right 5th digit (8/19/21) who presented to ED with c/o right foot pain associated with discharge and foul odor. No post op follow up. Came to the ED due to sub obtimal pain control. No fever, chest, pain, palpitations, nausea, vomiting, diarrhea (17 Sep 2021 01:11)    INTERVAL HPI/ HOSPITAL COURSE   MRI confirms suspected osteomyelitis of the R foot - patient followed by podiatry and ID; and underwent debridement and wound VAC placement on R foot. Surgical pathology resulted OM, and wound culture resulting Enterococcus Aeromonal and Klebsiella- treated initially with Zosyn. He developed OREN likely mixed 2/2 to a mixed etiology with prerenal from active infection/ pulmonary edema, intrarenal due to toxicity from IV antibiotics and post renal from urinary retention, Nephrology consulted for optimization of kidney function. IV antibiotic regimen switched to Unasyn and Levaquin, sensitive for cultured organisms; requiring 6 weeks of IV antibiotics.     Patient then developed Pulmonary Edema and was treated with IV lasix. Cardiology consulted and recommending SABIHA with L/R heart cath once infection clears and medically stable. Hospital course further complicated by abdominal distention and constipation for which he received lactulose and had vomiting. AXR shows distended loops of bowel for which NG tube was placed which the patient removed overnight 9/26 - re-attempts to replace NG tube were unsuccessful as patient was non-cooperative.     The patient went on to develop worsening respiratory distress sats 80s% SPO2 on 10L NC : CXR showed possible RLL PNA. likely aspiration.CT abdomen deferred for now as patient is unstable and will not tolerate lying flat. CT chest done showing R>L pulmonary edema vs Pneumonia;. Pulm (DR. Xavier) consulted for possible thoracentesis, and recommended to start albumin + lasix. ICU was consulted for worsening respiratory status. Surgery consulted for pigtail placement, however, inadequate visualization of pocket.  Patient denies any current trouble breathing, chest pain, or cough.     INTERVAL HPI/OVERNIGHT EVENTS:   Refused BIPAP overnight    PRESSORS: [X ] NO      ANTIBIOTICS:        unasyn          DATE STARTED:  9/30/2021  ANTIBIOTICS:          levaquin      DATE STARTED:   9/30/2021    IV fluconazole    Antimicrobial:  ampicillin/sulbactam  IVPB 3 Gram(s) IV Intermittent every 6 hours  fluconAZOLE IVPB      fluconAZOLE IVPB 100 milliGRAM(s) IV Intermittent every 24 hours  levoFLOXacin  Tablet 250 milliGRAM(s) Oral every 24 hours    Cardiovascular:  furosemide   Injectable 40 milliGRAM(s) IV Push every 6 hours  metoprolol succinate  milliGRAM(s) Oral daily  NIFEdipine XL 60 milliGRAM(s) Oral daily  tamsulosin 0.4 milliGRAM(s) Oral at bedtime    Pulmonary:    Hematalogic:  aspirin  chewable 81 milliGRAM(s) Oral daily  heparin   Injectable 5000 Unit(s) SubCutaneous every 8 hours    Other:  albumin human 25% IVPB 50 milliLiter(s) IV Intermittent every 6 hours  atorvastatin 80 milliGRAM(s) Oral at bedtime  chlorhexidine 2% Cloths 1 Application(s) Topical <User Schedule>  cyanocobalamin 1000 MICROGram(s) Oral daily  ergocalciferol 01155 Unit(s) Oral <User Schedule>  ferrous    sulfate 325 milliGRAM(s) Oral daily  finasteride 5 milliGRAM(s) Oral daily  insulin lispro (ADMELOG) corrective regimen sliding scale   SubCutaneous Before meals and at bedtime  ondansetron Injectable 4 milliGRAM(s) IV Push three times a day PRN  pantoprazole    Tablet 40 milliGRAM(s) Oral before breakfast  polyethylene glycol 3350 17 Gram(s) Oral daily  senna 2 Tablet(s) Oral at bedtime  sodium chloride 0.9% lock flush 10 milliLiter(s) IV Push every 1 hour PRN      Drug Dosing Weight  Height (cm): 170.2 (17 Sep 2021 21:58)  Weight (kg): 87 (05 Oct 2021 21:45)  BMI (kg/m2): 30 (05 Oct 2021 21:45)  BSA (m2): 1.99 (05 Oct 2021 21:45)    CENTRAL LINE: [ ] YES [ ] NO  LOCATION:   DATE INSERTED:  REMOVE: [ ] YES [ ] NO  EXPLAIN:    [place]: [ ] YES [ ] NO    DATE INSERTED:  REMOVE:  [ ] YES [ ] NO  EXPLAIN:    A-LINE:  [ ] YES [ ] NO  LOCATION:   DATE INSERTED:  REMOVE:  [ ] YES [ ] NO  EXPLAIN:    PMH/Social Hx/Fam Hx -reviewed admission note, no change since admission  PAST MEDICAL & SURGICAL HISTORY:  HTN (hypertension)    HLD (hyperlipidemia)    DM (diabetes mellitus)    CAD (coronary artery disease)    Glaucoma, angle-closure    [place] (hard of hearing)    S/P CABG (coronary artery bypass graft)    History of thyroid surgery      Heart faliure: acute [ ] chronic [ ] acute or chronic [ ] diastolic [ ] systolic [ ] combied systolic and diastolic[ ]  OREN: ATN[ ] renal medullary necrosis [ ] CKD I [ ]CKDII [ ]CKD III [ ]CKD IV [ ]CKD V [ ]Other pathological lesions [ ]  Abdominal Nutrition Status: malnutrition [ ] cachexia [ ] morbid obesity/BMI=40 [ ] Supplement ordered [___________]     T(C): 36.2 (10-06-21 @ 05:51), Max: 36.5 (10-05-21 @ 20:52)  HR: 78 (10-06-21 @ 06:00)  BP: 124/54 (10-06-21 @ 06:00)  BP(mean): 72 (10-06-21 @ 06:00)  ABP: --  ABP(mean): --  RR: 13 (10-06-21 @ 06:00)  SpO2: 94% (10-06-21 @ 06:00)  Wt(kg): --    ABG - ( 06 Oct 2021 03:48 )  pH, Arterial: 7.46  pH, Blood: x     /  pCO2: 32    /  pO2: 56    / HCO3: 23    / Base Excess: -0.3  /  SaO2: 89                    10-05 @ 07:01  -  10-06 @ 07:00  --------------------------------------------------------  IN: 0 mL / OUT: 250 mL / NET: -250 mL            PHYSICAL EXAM:    GENERAL: [ ]NAD, [ ]well-groomed, [ ]well-developed  HEAD:  [ ]Atraumatic, [ ]Normocephalic  EYES: [ ]EOMI, [ ]PERRLA, [ ]conjunctiva and sclera clear  ENMT: [ ]No tonsillar erythema, exudates, or enlargement; [ ]Moist mucous membranes, [ ]Good dentition, [ ]No lesions  NECK: [ ]Supple, normal appearance, [ ]No JVD; [ ]Normal thyroid; [ ]Trachea midline  NERVOUS SYSTEM:  [ ]Alert & Oriented X3, [ ]Good concentration; [ ]Motor Strength 5/5 B/L upper and lower extremities; [ ]DTRs 2+ intact and symmetric  CHEST/LUNG: [ ]No chest deformity; [ ]Normal percussion bilaterally; [ ]No rales, rhonchi, wheezing; [ ]Crackles at bases  HEART: [ ]Regular rate and rhythm; [ ]No murmurs, rubs, or gallops  ABDOMEN: [ ]Soft, Nontender, Nondistended; [ ]Bowel sounds present  EXTREMITIES:  [ ]2+ Peripheral Pulses, [ ]No clubbing, cyanosis, or edema [ ]Bilat lower extremity edema  LYMPH: [ ]No lymphadenopathy noted  SKIN: [ ]No rashes or lesions; [ ]Good capillary refill      LABS:  CBC Full  -  ( 06 Oct 2021 06:36 )  WBC Count : 7.24 K/uL  RBC Count : 2.98 M/uL  Hemoglobin : 8.9 g/dL  Hematocrit : 27.2 %  Platelet Count - Automated : 280 K/uL  Mean Cell Volume : 91.3 fl  Mean Cell Hemoglobin : 29.9 pg  Mean Cell Hemoglobin Concentration : 32.7 gm/dL  Auto Neutrophil # : 5.52 K/uL  Auto Lymphocyte # : 1.04 K/uL  Auto Monocyte # : 0.49 K/uL  Auto Eosinophil # : 0.13 K/uL  Auto Basophil # : 0.04 K/uL  Auto Neutrophil % : 76.1 %  Auto Lymphocyte % : 14.4 %  Auto Monocyte % : 6.8 %  Auto Eosinophil % : 1.8 %  Auto Basophil % : 0.6 %    10-06    144  |  104  |  27<H>  ----------------------------<  96  3.3<L>   |  24  |  2.02<H>    Ca    8.5      06 Oct 2021 06:36  Phos  3.8     10-06  Mg     2.3     10-06    TPro  6.9  /  Alb  2.5<L>  /  TBili  0.6  /  DBili  x   /  AST  26  /  ALT  16  /  AlkPhos  97  10-06            RADIOLOGY & ADDITIONAL STUDIES REVIEWED:      [ ]GOALS OF CARE DISCUSSION WITH PATIENT/FAMILY/PROXY:    CRITICAL CARE TIME SPENT: 35 minutes Patient is a 76y old  Male who presents with a chief complaint of R Foot Pain (05 Oct 2021 17:10)      HPI:  Patient is a 76 year old male  with PMH of poorly controlled IDDM Hgb A1C 11.4, HTN, HLD, stage III CKD, CAD s/p CABG and recent partial amputation to right 5th digit (8/19/21) who presented to ED with c/o right foot pain associated with discharge and foul odor. No post op follow up. Came to the ED due to sub obtimal pain control. No fever, chest, pain, palpitations, nausea, vomiting, diarrhea (17 Sep 2021 01:11)    INTERVAL HPI/ HOSPITAL COURSE   MRI confirms suspected osteomyelitis of the R foot - patient followed by podiatry and ID; and underwent debridement and wound VAC placement on R foot. Surgical pathology resulted OM, and wound culture resulting Enterococcus Aeromonal and Klebsiella- treated initially with Zosyn. He developed OREN likely mixed 2/2 to a mixed etiology with prerenal from active infection/ pulmonary edema, intrarenal due to toxicity from IV antibiotics and post renal from urinary retention, Nephrology consulted for optimization of kidney function. IV antibiotic regimen switched to Unasyn and Levaquin, sensitive for cultured organisms; requiring 6 weeks of IV antibiotics.     Patient then developed Pulmonary Edema and was treated with IV lasix. Cardiology consulted and recommending SABIHA with L/R heart cath once infection clears and medically stable. Hospital course further complicated by abdominal distention and constipation for which he received lactulose and had vomiting. AXR shows distended loops of bowel for which NG tube was placed which the patient removed overnight 9/26 - re-attempts to replace NG tube were unsuccessful as patient was non-cooperative.     The patient went on to develop worsening respiratory distress sats 80s% SPO2 on 10L NC : CXR showed possible RLL PNA. likely aspiration.CT abdomen deferred for now as patient is unstable and will not tolerate lying flat. CT chest done showing R>L pulmonary edema vs Pneumonia;. Pulm (DR. Xavier) consulted for possible thoracentesis, and recommended to start albumin + lasix. ICU was consulted for worsening respiratory status. Surgery consulted for pigtail placement, however, inadequate visualization of pocket.  Patient denies any current trouble breathing, chest pain, or cough.     INTERVAL HPI/OVERNIGHT EVENTS:   Refused BIPAP overnight    PRESSORS: [X ] NO      ANTIBIOTICS:        unasyn          DATE STARTED:  9/30/2021  ANTIBIOTICS:          levaquin      DATE STARTED:   9/30/2021    IV fluconazole    Antimicrobial:  ampicillin/sulbactam  IVPB 3 Gram(s) IV Intermittent every 6 hours  fluconAZOLE IVPB      fluconAZOLE IVPB 100 milliGRAM(s) IV Intermittent every 24 hours  levoFLOXacin  Tablet 250 milliGRAM(s) Oral every 24 hours    Cardiovascular:  furosemide   Injectable 40 milliGRAM(s) IV Push every 6 hours  metoprolol succinate  milliGRAM(s) Oral daily  NIFEdipine XL 60 milliGRAM(s) Oral daily  tamsulosin 0.4 milliGRAM(s) Oral at bedtime    Pulmonary:    Hematalogic:  aspirin  chewable 81 milliGRAM(s) Oral daily  heparin   Injectable 5000 Unit(s) SubCutaneous every 8 hours    Other:  albumin human 25% IVPB 50 milliLiter(s) IV Intermittent every 6 hours  atorvastatin 80 milliGRAM(s) Oral at bedtime  chlorhexidine 2% Cloths 1 Application(s) Topical <User Schedule>  cyanocobalamin 1000 MICROGram(s) Oral daily  ergocalciferol 08019 Unit(s) Oral <User Schedule>  ferrous    sulfate 325 milliGRAM(s) Oral daily  finasteride 5 milliGRAM(s) Oral daily  insulin lispro (ADMELOG) corrective regimen sliding scale   SubCutaneous Before meals and at bedtime  ondansetron Injectable 4 milliGRAM(s) IV Push three times a day PRN  pantoprazole    Tablet 40 milliGRAM(s) Oral before breakfast  polyethylene glycol 3350 17 Gram(s) Oral daily  senna 2 Tablet(s) Oral at bedtime  sodium chloride 0.9% lock flush 10 milliLiter(s) IV Push every 1 hour PRN      Drug Dosing Weight  Height (cm): 170.2 (17 Sep 2021 21:58)  Weight (kg): 87 (05 Oct 2021 21:45)  BMI (kg/m2): 30 (05 Oct 2021 21:45)  BSA (m2): 1.99 (05 Oct 2021 21:45)        PMH/Social Hx/Fam Hx -reviewed admission note, no change since admission  PAST MEDICAL & SURGICAL HISTORY:  HTN (hypertension)    HLD (hyperlipidemia)    DM (diabetes mellitus)    CAD (coronary artery disease)    Glaucoma, angle-closure    [place] (hard of hearing)    S/P CABG (coronary artery bypass graft)    History of thyroid surgery      Heart faliure: acute [ ] chronic [ ] acute or chronic [ ] diastolic [ ] systolic [ ] combied systolic and diastolic[ ]  OREN: ATN[ ] renal medullary necrosis [ ] CKD I [ ]CKDII [ ]CKD III [ ]CKD IV [ ]CKD V [ ]Other pathological lesions [ ]  Abdominal Nutrition Status: malnutrition [ ] cachexia [ ] morbid obesity/BMI=40 [ ] Supplement ordered [___________]     T(C): 36.2 (10-06-21 @ 05:51), Max: 36.5 (10-05-21 @ 20:52)  HR: 78 (10-06-21 @ 06:00)  BP: 124/54 (10-06-21 @ 06:00)  BP(mean): 72 (10-06-21 @ 06:00)  ABP: --  ABP(mean): --  RR: 13 (10-06-21 @ 06:00)  SpO2: 94% (10-06-21 @ 06:00)  Wt(kg): --    ABG - ( 06 Oct 2021 03:48 )  pH, Arterial: 7.46  pH, Blood: x     /  pCO2: 32    /  pO2: 56    / HCO3: 23    / Base Excess: -0.3  /  SaO2: 89                    10-05 @ 07:01  -  10-06 @ 07:00  --------------------------------------------------------  IN: 0 mL / OUT: 250 mL / NET: -250 mL            PHYSICAL EXAM:    GENERAL: NAD, lying in bed comfortably  HEAD:  Atraumatic, Normocephalic  EYES: EOMI, PERRLA, conjunctiva and sclera clear  ENT: Dry mucous membrane  NECK: Supple, No JVD  CHEST/LUNG: Clear to auscultation bilaterally; crackles on right. rhonchi, wheezing, or rubs. Unlabored respirations  HEART: Regular rate and rhythm; GII holosystolic plataeu, at 5th ICS MCL, GI systolic crescendo decrescendo at RUSB, no rubs, or gallops  ABDOMEN: Bowel sounds present; Soft, Nontender, mild distession and frimness, no RT.   EXTREMITIES:  2+ Peripheral Pulses, brisk capillary refill. No clubbing, cyanosis, or edema  NERVOUS SYSTEM:  Alert & Oriented X3, speech clear. No deficits   MSK: FROM all 4 extremities, full and equal strength  SKIN: No rashes or lesions      LABS:  CBC Full  -  ( 06 Oct 2021 06:36 )  WBC Count : 7.24 K/uL  RBC Count : 2.98 M/uL  Hemoglobin : 8.9 g/dL  Hematocrit : 27.2 %  Platelet Count - Automated : 280 K/uL  Mean Cell Volume : 91.3 fl  Mean Cell Hemoglobin : 29.9 pg  Mean Cell Hemoglobin Concentration : 32.7 gm/dL  Auto Neutrophil # : 5.52 K/uL  Auto Lymphocyte # : 1.04 K/uL  Auto Monocyte # : 0.49 K/uL  Auto Eosinophil # : 0.13 K/uL  Auto Basophil # : 0.04 K/uL  Auto Neutrophil % : 76.1 %  Auto Lymphocyte % : 14.4 %  Auto Monocyte % : 6.8 %  Auto Eosinophil % : 1.8 %  Auto Basophil % : 0.6 %    10-06    144  |  104  |  27<H>  ----------------------------<  96  3.3<L>   |  24  |  2.02<H>    Ca    8.5      06 Oct 2021 06:36  Phos  3.8     10-06  Mg     2.3     10-06    TPro  6.9  /  Alb  2.5<L>  /  TBili  0.6  /  DBili  x   /  AST  26  /  ALT  16  /  AlkPhos  97  10-06            RADIOLOGY & ADDITIONAL STUDIES REVIEWED:      [ ]GOALS OF CARE DISCUSSION WITH PATIENT/FAMILY/PROXY:    CRITICAL CARE TIME SPENT: 35 minutes

## 2021-10-06 NOTE — PROGRESS NOTE ADULT - SUBJECTIVE AND OBJECTIVE BOX
[facility] NEPHROLOGY- PROGRESS NOTE    Patient is a 75yo Male with DM on insulin, HTN, HLD, CAD, s/p R partial 5th ray amputation 8/19/2021 p/w R foot pain and foul drainage a/w diabetic foot ulcer suspicious for osteomyelitis. s/p OR debridement today. Nephrology consulted for abrupt rise in SCr.   s/p ibrahim placement for distended bladder on 9/23 with ~400ml of urine o/p  9/27- pt with SOB; CXR with pulmonary edema- pt given Lasix 80mg IV x1. Concern for aspiration PNA  Course BP: s/p lasix 60mg IV on 10/1 & 10/2 and s/p Lasix 40mg IV x1 today 10/4. Bladder scan with 1L urine; s/p ibrahim reinserted  Transferred to ICU for acute hypoxic respiratory failure    Hospital Medications: Medications reviewed.  REVIEW OF SYSTEMS:  CONSTITUTIONAL: No fevers or chills  RESPIRATORY: no shortness of breath +cough dry  CARDIOVASCULAR: No chest pain.  GASTROINTESTINAL: No nausea or vomiting, or diarrhea or abdominal pain    VASCULAR: No bilateral lower extremity edema. Rt foot - denies pain    VITALS:  T(F): 98.1 (10-06-21 @ 12:00), Max: 98.1 (10-06-21 @ 12:00)  HR: 80 (10-06-21 @ 13:00)  BP: 95/74 (10-06-21 @ 13:00)  RR: 29 (10-06-21 @ 13:00)  SpO2: 77% (10-06-21 @ 13:00)  Wt(kg): --    10-05 @ 07:01  -  10-06 @ 07:00  --------------------------------------------------------  IN: 260 mL / OUT: 1375 mL / NET: -1115 mL        Weight (kg): 86.9 (10-05 @ 21:45)    PHYSICAL EXAM:  Gen: NAD, calm  HEENT: MMM  Neck: no JVD  Cards: RRR, +S1/S2, +TRINIDAD  Resp:  coarse BS b/l  GI: soft, NT  : +ibrahim  Extremities: no LE edema B/L  Derm: Rt foot wrapped    LABS:  10-06    144  |  104  |  27<H>  ----------------------------<  96  3.3<L>   |  24  |  2.02<H>    Ca    8.5      06 Oct 2021 06:36  Phos  3.8     10-06  Mg     2.3     10-06    TPro  6.9  /  Alb  2.5<L>  /  TBili  0.6  /  DBili      /  AST  26  /  ALT  16  /  AlkPhos  97  10-06    Creatinine Trend: 2.02 <--, 2.11 <--, 2.17 <--, 2.13 <--, 1.70 <--, 1.49 <--, 1.48 <--, 1.66 <--                        8.9    7.24  )-----------( 280      ( 06 Oct 2021 06:36 )             27.2     Urine Studies:    Creatinine, Random Urine: 123 mg/dL (10-05 @ 12:43)  Chloride, Random Urine: <10 mmol/L (10-05 @ 12:43)  Sodium, Random Urine: 44 mmol/L (10-05 @ 12:43)        < from: CT Chest No Cont (10.05.21 @ 14:07) >    EXAM:  CT CHEST                            PROCEDURE DATE:  10/05/2021          < end of copied text >  < from: CT Chest No Cont (10.05.21 @ 14:07) >  IMPRESSION:  Pulmonary edema with bilateral moderate to large pleural effusions. Underlying bilateral lower lobe compressive atelectasis versus pneumonia.    Mildly enlarged mediastinal lymph nodes, possibly reactive.    < end of copied text >

## 2021-10-06 NOTE — SWALLOW VFSS/MBS ASSESSMENT ADULT - SLP GENERAL OBSERVATIONS
Pt Alert & Oriented, seated in imaging chair in left lateral view. (+) Pendant O2 NC 8L. NAD. Study conducted with Radiologist, Dr. Felix. PO trials of Puree, Mech soft, Honey & nectar thick, & thin liquids administered.

## 2021-10-06 NOTE — SWALLOW VFSS/MBS ASSESSMENT ADULT - RECOMMENDED FEEDING/EATING TECHNIQUES
repeat swalow 2-3/allow for swallow between intakes/alternate food with liquid/check mouth frequently for oral residue/pocketing/crush medication (when feasible)/hard swallow w/ each bite or sip/maintain upright posture during/after eating for 30 mins/no straws/oral hygiene/position upright (90 degrees)/provide rest periods between swallows/small sips/bites

## 2021-10-06 NOTE — PROGRESS NOTE ADULT - SUBJECTIVE AND OBJECTIVE BOX
Patient seen and examined at bedside in ICU  On nasal cannula    Vital Signs Last 24 Hrs  T(F): 97.2 (10-06-21 @ 05:51), Max: 97.7 (10-05-21 @ 20:52)  HR: 79 (10-06-21 @ 07:00)  BP: 113/52 (10-06-21 @ 07:00)  RR: 22 (10-06-21 @ 07:00)  SpO2: 93% (10-06-21 @ 07:00)  POCT Blood Glucose.: 110 mg/dL (06 Oct 2021 05:32)    GENERAL: Alert, NAD  CHEST/LUNG: on 8 L nasal cannula, mild tachypnea    I&O's Detail    05 Oct 2021 07:01  -  06 Oct 2021 07:00  --------------------------------------------------------  IN:    Oral Fluid: 260 mL  Total IN: 260 mL    OUT:    Indwelling Catheter - Urethral (mL): 1125 mL    Voided (mL): 250 mL  Total OUT: 1375 mL    Total NET: -1115 mL    LABS:                        8.9    7.24  )-----------( 280      ( 06 Oct 2021 06:36 )             27.2     10-06    144  |  104  |  27<H>  ----------------------------<  96  3.3<L>   |  24  |  2.02<H>    Ca    8.5      06 Oct 2021 06:36  Phos  3.8     10-06  Mg     2.3     10-06    TPro  6.9  /  Alb  2.5<L>  /  TBili  0.6  /  DBili  x   /  AST  26  /  ALT  16  /  AlkPhos  97  10-06    RADIOLOGY & ADDITIONAL STUDIES:  < from: CT Chest No Cont (10.05.21 @ 14:07) >  IMPRESSION:  Pulmonary edema with bilateral moderate to large pleural effusions. Underlying bilateral lower lobe compressive atelectasis versus pneumonia.    Mildly enlarged mediastinal lymph nodes, possibly reactive.    --- End of Report ---    MARK THOMPSON MD; Attending Radiologist  This document has been electronically signed. Oct  5 2021  2:34PM    < end of copied text >

## 2021-10-06 NOTE — PROGRESS NOTE ADULT - ASSESSMENT
A:   s/p R 5th ray resection - 8/19  s/p R 5th wound debridement, graft application, bone biopsy and wound vac application 9/22       P:  Patient evaluated, chart reviewed  Dressing removed   Wound Vac changed today (10/6)  Recommend elevation of b/l legs   Continue local wound care to allow graft incorporation  Continue abx per ID recommendation  Pt stable from podiatry standpoint  Upon discharge:  Please keep dressing clean, dry and intact  Pt nonwb to R foot  Change wound vac every other day to R 5th ray surgical site   Pt to follow up in outpatient clinic at [address] [facility] [address]. Call [phone] to make appointment  Discussed with Dr. Vazquez A:   s/p R 5th ray resection - 8/19  s/p R 5th wound debridement, graft application, bone biopsy and wound vac application 9/22       P:  Patient evaluated, chart reviewed  Pt seen in ICU for worsening hypoxia and dyspnea   Dressing removed   Wound Vac changed today (10/6)  Recommend elevation of b/l legs   Continue local wound care to allow graft incorporation  Continue abx per ID recommendation  Pt stable from podiatry standpoint  Upon discharge:  Please keep dressing clean, dry and intact  Pt nonwb to R foot  Change wound vac every other day to R 5th ray surgical site   Pt to follow up in outpatient clinic at [address] [facility] [address]. Call [phone] to make appointment  Discussed with Dr. Vazquez

## 2021-10-06 NOTE — PROGRESS NOTE ADULT - ASSESSMENT
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 s/p OR debridement today. Nephrology consulted for abrupt rise in SCr.     1. OREN- likely ATN in the setting of infection and ACEi use. Lisinopril discontinued 9/22. ATN was resolving, however renal fxn now worsened. s/p CT chest with b/l mod to large pleural effusion R>L. Awaiting IR rt pigtail catheter.  Pt on albumin assisted diuresis as per ICU  Kidney/ Bladder US with large distended bladder s/p ibrahim placement on 9/23, then removed. s/p bladder scan 10/4 with 1L urine for which ibrahim was reinserted; however; renal function did not improve post ibrahim; less likely due to obstructive uropathy.  No peripheral eosinophilia- no signs of AIN. C3 & C4 wnl with neg ASO- no signs of PIGN. Strict I/Os. Avoid nephrotoxins/ NSAIDs/ RCA. Monitor BMP.    2. 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 Will defer secondary w/u as an outpt. Avoid nephrotoxins  3. HTN 2/2 CKD- BP acceptable. Lisinopril d/c due to OREN. c/w Nifedipine ER 60mg PO qd. Monitor BP  4. Acute osteomyelitis- s/p debridement 9/22. Pt on Unasyn & Levaquin. Plan as per ID      [facility] NEPHROLOGY  Bryn Johnson M.D.  Domingo Booth D.O.  Zakia Rodriguez M.D.  Zonia Adams, MSN, ANP-C  [phone]    [address] [place] [address]   Patient is a 77yo Male with DM on insulin, HTN, HLD, CAD, s/p R partial 5th ray amputation 8/19/2021 p/w R foot pain and foul drainage a/w diabetic foot ulcer suspicious for osteomyelitis. s/p OR debridement today. Nephrology consulted for abrupt rise in SCr.     1. OREN- likely ATN in the setting of infection and ACEi use. Lisinopril discontinued 9/22. ATN was resolving, however renal fxn now worsened. s/p CT chest with b/l mod to large pleural effusion R>L. Awaiting IR rt pigtail catheter.  Pt on albumin assisted diuresis as per ICU. FeUrea 36%; inconclusive  Kidney/ Bladder US with large distended bladder s/p ibrahim placement on 9/23, then removed. s/p bladder scan 10/4 with 1L urine for which ibrahim was reinserted; however; renal function did not improve post ibrahim; less likely due to obstructive uropathy.  No peripheral eosinophilia- no signs of AIN. C3 & C4 wnl with neg ASO- no signs of PIGN. Strict I/Os. Avoid nephrotoxins/ NSAIDs/ RCA. Monitor BMP.    2. 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 Will defer secondary w/u as an outpt. Avoid nephrotoxins  3. HTN 2/2 CKD- BP acceptable. Lisinopril d/c due to OREN. c/w Nifedipine ER 60mg PO qd. Monitor BP  4. Acute osteomyelitis- s/p debridement 9/22. Pt on Unasyn & Levaquin. Plan as per ID      [facility] NEPHROLOGY  Bryn Johnson M.D.  Domingo Booth D.O.  Zakia Rodriguez M.D.  Zonia Adams, MSN, ANP-C  [phone]    [address] [place] [address]

## 2021-10-06 NOTE — PROGRESS NOTE ADULT - SUBJECTIVE AND OBJECTIVE BOX
C A R D I O L O G Y  **********************************    DATE OF SERVICE: 10-06-21    Events noted, patient desated yesterday, transferred to ICU.  Breathing better no CP, Review of systems otherwise (-)      albumin human 25% IVPB 50 milliLiter(s) IV Intermittent every 6 hours  ampicillin/sulbactam  IVPB 3 Gram(s) IV Intermittent every 6 hours  aspirin  chewable 81 milliGRAM(s) Oral daily  atorvastatin 80 milliGRAM(s) Oral at bedtime  chlorhexidine 2% Cloths 1 Application(s) Topical <User Schedule>  cyanocobalamin 1000 MICROGram(s) Oral daily  ergocalciferol 57049 Unit(s) Oral <User Schedule>  ferrous    sulfate 325 milliGRAM(s) Oral daily  finasteride 5 milliGRAM(s) Oral daily  fluconAZOLE IVPB      fluconAZOLE IVPB 100 milliGRAM(s) IV Intermittent every 24 hours  furosemide   Injectable 40 milliGRAM(s) IV Push every 6 hours  heparin   Injectable 5000 Unit(s) SubCutaneous every 8 hours  insulin lispro (ADMELOG) corrective regimen sliding scale   SubCutaneous Before meals and at bedtime  levoFLOXacin  Tablet 250 milliGRAM(s) Oral every 24 hours  metoprolol succinate  milliGRAM(s) Oral daily  NIFEdipine XL 60 milliGRAM(s) Oral daily  ondansetron Injectable 4 milliGRAM(s) IV Push three times a day PRN  pantoprazole    Tablet 40 milliGRAM(s) Oral before breakfast  polyethylene glycol 3350 17 Gram(s) Oral daily  senna 2 Tablet(s) Oral at bedtime  sodium chloride 0.9% lock flush 10 milliLiter(s) IV Push every 1 hour PRN  tamsulosin 0.4 milliGRAM(s) Oral at bedtime                            8.9    7.24  )-----------( 280      ( 06 Oct 2021 06:36 )             27.2       Hemoglobin: 8.9 g/dL (10-06 @ 06:36)  Hemoglobin: 8.8 g/dL (10-05 @ 22:32)  Hemoglobin: 10.0 g/dL (10-05 @ 06:43)  Hemoglobin: 8.9 g/dL (10-04 @ 06:10)  Hemoglobin: 8.7 g/dL (10-03 @ 05:58)      10-06    144  |  104  |  27<H>  ----------------------------<  96  3.3<L>   |  24  |  2.02<H>    Ca    8.5      06 Oct 2021 06:36  Phos  3.8     10-06  Mg     2.3     10-06    TPro  6.9  /  Alb  2.5<L>  /  TBili  0.6  /  DBili  x   /  AST  26  /  ALT  16  /  AlkPhos  97  10-06    Creatinine Trend: 2.02<--, 2.11<--, 2.17<--, 2.13<--, 1.70<--, 1.49<--    COAGS:           T(C): 36.2 (10-06-21 @ 05:51), Max: 36.5 (10-05-21 @ 20:52)  HR: 75 (10-06-21 @ 10:00) (66 - 81)  BP: 112/51 (10-06-21 @ 10:00) (110/60 - 124/54)  RR: 24 (10-06-21 @ 10:00) (13 - 32)  SpO2: 92% (10-06-21 @ 10:00) (85% - 98%)  Wt(kg): --    I&O's Summary    05 Oct 2021 07:01  -  06 Oct 2021 07:00  --------------------------------------------------------  IN: 260 mL / OUT: 1375 mL / NET: -1115 mL      HEENT:  (-)icterus (-)pallor  CV: N S1 S2 1/6 TR[place] (+)2 Pulses B/l  Resp:  [place] sounds B/L, normal effort  GI: (+) BS Soft, NT, ND  Lymph:  (-)Edema, (-)obvious lymphadenopathy  Skin: Warm to touch, Normal turgor  Psych: Appropriate mood and affect      ASSESSMENT/PLAN: 	76y  Male PMH DM on insulin, HTN, HLD, normal lV fx moderate to severe AS 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    - monitor crt, nephrology f/u appreciated   - Podiatry f/u noted  - Abx per primary team  - Positive trop noted, likely due to increased demand and renal failure.  - He will need a SABIHA and R+L heart cath once he is medically optimized and infection treated  - Cont medical management of CAD  -  Thoracic f/u for possible pig tail placement       Zak Wilkins MD, [facility]  BEEPER [phone]

## 2021-10-06 NOTE — SWALLOW VFSS/MBS ASSESSMENT ADULT - PHARYNGEAL PHASE COMMENTS
3 swallows needed to clear 1 tspn trial of puree 70%.  ***Shoulder occludes view of trachea below vocal folds. While no laryngeal penetration was seen at this exam, constraints of radiographic exam could not exclude possible post-imaging penetration of aspiration from remaining pharyngeal stasis.*** ***Shoulder occludes view of trachea below vocal folds. While no laryngeal penetration was seen at this exam, constraints of radiographic exam could not exclude possible post-imaging penetration of aspiration from remaining pharyngeal stasis. Potential risk exists due to remaining pharyngeal residue.*** ***Shoulder occludes view of trachea below vocal folds. While no laryngeal penetration was seen at this exam, constraints of radiographic exam could not exclude possible post-imaging penetration of aspiration from remaining pharyngeal stasis.*** 2 swallows needed to clear 1 tspn trial of puree 80%.  ***Shoulder occludes view of trachea below vocal folds. While no laryngeal penetration was seen at this exam, constraints of radiographic exam could not exclude possible post-imaging penetration of aspiration from remaining pharyngeal stasis.***

## 2021-10-06 NOTE — PROGRESS NOTE ADULT - ATTENDING COMMENTS
IMP: This is a  76 yr old man from home, lives with daughter with DM- uncontrol  on insulin, HTN, HLD, CAD, PSH R partial 5th ray amputation 8/19/2021 p/w R foot pain x1 day associated with foul smelling discharge due to osteomyelitis beig treated with iv antibx .  Pulmonary evaluation requested for hypoxia  with pleural effusion         Assessment:  - Acute Hypoxic Respiratory Failure  - Pulmonary Edema  - Pleural Effusion   - Possible  Aspiration PNA   - Osteomyelitis of right foot   - OREN superimposed on CKD  - Severe AS   - CAD s/p CABG   - Cholelithiasis   - HTN  - HLD  - DM uncontrol   - Diabetic foot ulcer      Plan   -transferred to icu 10/5   -pat is failing O2 supp via oxygen pendent   -will need BiPaP vs intubation   -thoracic surg unable to place chest tube   -hemodynamic monitoring   -IR for pig tail chest tube placement   -continue antibx   -monitor blood sugar with coverage   -advance directives  -DVT/ GI prphy

## 2021-10-06 NOTE — PROGRESS NOTE ADULT - SUBJECTIVE AND OBJECTIVE BOX
Podiatry Interval HPI: Pt seen bedside resting comfortably. Patient states that he had been keeping the dressing clean and intact. Endorses to mild pain on the right foot. Pt denies any overnight acute events.  Denies constitutional symptoms No other pedal complaints.    Podiatry HPI: 76 year old male with a PMHx of DM, HTN, HLD, CAD to ED presents to the ED for a Right Foot s/p 5th foot partial ray resection and fillet toe flap. Patient states that he has sharp localized non-radiating pain to the Right foot 5th metatarsal. States that after his surgery, he did not see a podiatrist and he came into the ED because he saw drainage and was having pain. Denies any constitutional symptoms of N/V/C/F/SOB. Denies any other pedal complaints at this time. Denies calf pain today, bilaterally.      Medications albumin human 25% IVPB 50 milliLiter(s) IV Intermittent every 6 hours  ampicillin/sulbactam  IVPB 3 Gram(s) IV Intermittent every 6 hours  aspirin  chewable 81 milliGRAM(s) Oral daily  atorvastatin 80 milliGRAM(s) Oral at bedtime  chlorhexidine 2% Cloths 1 Application(s) Topical <User Schedule>  cyanocobalamin 1000 MICROGram(s) Oral daily  ergocalciferol 85833 Unit(s) Oral <User Schedule>  ferrous    sulfate 325 milliGRAM(s) Oral daily  finasteride 5 milliGRAM(s) Oral daily  fluconAZOLE IVPB      fluconAZOLE IVPB 100 milliGRAM(s) IV Intermittent every 24 hours  furosemide   Injectable 40 milliGRAM(s) IV Push every 6 hours  heparin   Injectable 5000 Unit(s) SubCutaneous every 8 hours  insulin lispro (ADMELOG) corrective regimen sliding scale   SubCutaneous Before meals and at bedtime  levoFLOXacin  Tablet 250 milliGRAM(s) Oral every 24 hours  metoprolol succinate  milliGRAM(s) Oral daily  NIFEdipine XL 60 milliGRAM(s) Oral daily  ondansetron Injectable 4 milliGRAM(s) IV Push three times a day PRN  pantoprazole    Tablet 40 milliGRAM(s) Oral before breakfast  polyethylene glycol 3350 17 Gram(s) Oral daily  senna 2 Tablet(s) Oral at bedtime  sodium chloride 0.9% lock flush 10 milliLiter(s) IV Push every 1 hour PRN  tamsulosin 0.4 milliGRAM(s) Oral at bedtime    FH: Family history of acute myocardial infarction (Father)    Family history of diabetes mellitus (Mother, Sibling)    Family history of hypertension (Mother, Sibling)    Family history of hyperlipidemia (Mother, Sibling)    ,   PMH: HTN (hypertension)    HLD (hyperlipidemia)    DM (diabetes mellitus)    CAD (coronary artery disease)    Glaucoma, angle-closure    [place] (hard of hearing)       PSH: No significant past surgical history    S/P CABG (coronary artery bypass graft)    History of thyroid surgery        Labs                          8.9    7.24  )-----------( 280      ( 06 Oct 2021 06:36 )             27.2      10-06    144  |  104  |  27<H>  ----------------------------<  96  3.3<L>   |  24  |  2.02<H>    Ca    8.5      06 Oct 2021 06:36  Phos  3.8     10-06  Mg     2.3     10-06    TPro  6.9  /  Alb  2.5<L>  /  TBili  0.6  /  DBili  x   /  AST  26  /  ALT  16  /  AlkPhos  97  10-06     Vital Signs Last 24 Hrs  T(C): 36.7 (06 Oct 2021 12:00), Max: 36.7 (06 Oct 2021 12:00)  T(F): 98.1 (06 Oct 2021 12:00), Max: 98.1 (06 Oct 2021 12:00)  HR: 80 (06 Oct 2021 13:00) (66 - 81)  BP: 95/74 (06 Oct 2021 13:00) (95/74 - 124/54)  BP(mean): 79 (06 Oct 2021 13:00) (66 - 80)  RR: 29 (06 Oct 2021 13:00) (13 - 32)  SpO2: 77% (06 Oct 2021 13:00) (77% - 98%)  Sedimentation Rate, Erythrocyte: 108 mm/Hr (10-02-21 @ 07:02)  Sedimentation Rate, Erythrocyte: 77 mm/Hr (09-16-21 @ 16:03)         C-Reactive Protein, Serum: 43 mg/L (10-02-21 @ 14:05)  C-Reactive Protein, Serum: 45 mg/L (09-16-21 @ 23:33)  C-Reactive Protein, Serum: 85 mg/L (08-22-21 @ 21:30)  C-Reactive Protein, Serum: 67 mg/L (08-15-21 @ 11:55)   WBC Count: 7.24 K/uL (10-06-21 @ 06:36)  WBC Count: 6.55 K/uL (10-05-21 @ 22:32)    PT/INR - ( 06 Oct 2021 13:09 )   PT: 19.5 sec;   INR: 1.68 ratio        CAPILLARY BLOOD GLUCOSE    POCT Blood Glucose.: 115 mg/dL (06 Oct 2021 11:57)  POCT Blood Glucose.: 110 mg/dL (06 Oct 2021 05:32)  POCT Blood Glucose.: 110 mg/dL (05 Oct 2021 22:16)  POCT Blood Glucose.: 111 mg/dL (05 Oct 2021 21:00)  POCT Blood Glucose.: 133 mg/dL (05 Oct 2021 16:59)      ROS: All others negative unless otherwise stated in the HPI      PHYSICAL EXAM Kept bandage intact   LE Focused:    Vasc:  DP/PT pulses were faintly palpable, bilateral. DP/PT pulses were monophasic on doppler, bilaterally. CFT<3 seconds to all digits.   Derm: Surgical site with graft in place to R 5th ray, granular wound bed through the graft, minimal drainage or discharge. minimal erythema and edema noted, eschar forming over the wound. Dorsal foot wound noted- mild erythema and edema noted. DTI noted to b/l heel  Neuro: Protective sensation grossly diminished, b/l  MSK: 5/5 muscle strength noted to the pedal compartments. 5th digit amputation R foot        Imaging:    3 views right foot. Prior 8/20/2021.    Status post resection of the fifth toe and distal fifth metatarsal unchanged in appearance. No focal bone lysis or unusual periosteal reaction to suggest osteomyelitis. No acute fracture or dislocation. The remainder study unchanged. No soft tissue gas.    IMPRESSION: No acute finding. No plain film evidence of osteomyelitis.      MRI R foot:     INTERPRETATION:  Clinical Information: Recent fifth metatarsal head resection now with fifth digit pain, swelling and foul discharge.    Comparison: Radiographs of the right foot from 9/16/2021 and MRI the right foot from 8/16/2021.    Technique:  MRI of the right midfoot and forefoot.  Intravenous Contrast: None.    Findings:    There is a resection at the mid aspect of the fifth metatarsal shaft. There is a lateral soft tissue wound beginning at the level of the amputation which extends distally. There is susceptibility artifact in the region of the amputation consistent with postoperative change. There is hyperintense T2 marrow signal throughout the remaining fifth metatarsal and within the adjacent fourth metatarsal head which is nonspecific and could be related to recent postoperative changes although osteomyelitis is suspected.    There is edema and mild atrophy within the plantar muscles of the foot. There is minimal spurring at the first metatarsophalangeal and hallux sesamoid articulations.    Impression:  Resection at the mid aspect of the fifth metatarsal. Lateral soft tissue wound with marrow signal abnormality throughout the fifth metatarsal and within the adjacent fourth metatarsal head which is nonspecific in the setting of recent surgery although osteomyelitis is suspected.        Culture Results:     Rare Aeromonas hydrophila/caviae   Few Klebsiella oxytoca/Raoutella ornithinolytica   Few Enterococcus faecalis   Few Streptococcus agalactiae (Group B) isolated   Group B streptococci are susceptible to ampicillin,   penicillin and cefazolin, but may be resistant to   erythromycin and clindamycin.   Recommendations for intrapartum prophylaxis for Group B   streptococci are penicillin or ampicillin. (09.16.21 @ 21:42)     Gram Stain:   No polymorphonuclear cells seen per low power field   No organisms seen per oil power field (09.22.21 @ 13:54)       Historical Values  Gram Stain:   No polymorphonuclear cells seen per low power field   No organisms seen per oil power field (09.22.21 @ 13:54)   Gram Stain:   No polymorphonuclear leukocytes seen per low power field   No organisms seen per oil power field (08.20.21 @ 03:59)

## 2021-10-06 NOTE — CONSULT NOTE ADULT - ASSESSMENT
76 year old male with bilateral pleural effusions, unsuccessful placement at bedside with US by surgery. IR consulted for chest pigtale catheter placement.   76 year old male with bilateral pleural effusions, R>L, s/p unsuccessful placement at bedside with US by surgery. IR consulted for chest tube placement.

## 2021-10-06 NOTE — SWALLOW VFSS/MBS ASSESSMENT ADULT - SPECIFY REASON(S)
Objectively assess current swallow function; r/o aspiration. Last Seen on 9/29, however notable physical decline since & persistent RLL PNA seen on CXR.

## 2021-10-06 NOTE — PROGRESS NOTE ADULT - ASSESSMENT
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  As per daughter, patient was taking tylenol which did not help his pain prompting the patient to ask his daughter to take him to the hospital. ON admission, he has no fever or Leukocytosis. The Xray of Right foot shows no Osteomyelitis but MRI of Right foot shows Lateral soft tissue wound with marrow signal abnormality throughout the fifth metatarsal which is consistent of Osteomyelitis. He has seen by Podiatry and wound culture has sent, Zosyn and Vancomycin has started. The ID consult requested to assist with further evaluation and antibiotic management.     # Right Fifth metatarsal DFU with drainage and Osteomyelitis- wound cx grew Enterococcus, Aeromonas and Klebsiella - Zosyn is the ideal to cover All organisms but kidney function is worsening, hence change to Unasyn and Levaquin until kidney function is improved - The pathology shows Bone with acute osteomyelitis and necrotic periosteal tissue.   # OREN- s/p urinary retention -s/p ibrahim catheter - s/p discontinue ACEI  # RLL pneumonia- most likely Aspiration, S/p Vomiting - On Unasyn  # Large bowel distension  # Candiduria- 9/22/21  # Pulmonary edema with bilateral moderate to large pleural effusions- on CT chest, 10/5/21    would recommend:    1. IR guided pigtail placement with Bacterial, Fungal and AFB culture  2. Supplemental oxygenation and bronchodilator  as needed  3. Monitor  kidney function and c/w adjusted doses of Levaquin and  Unasyn as per crcl  4. Wound care as per Podiatry  5. Aspiration precaution  6. May change to Zosyn and oral fluconazole on discharge to continue until 11/3/21 to cover Osteomyelitis    d/w ICU team     Attending Attestation:    Spent more than 45 minutes on total encounter, more than 50 % of the visit was spent counseling and/or coordinating care by the Attending physician.     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  As per daughter, patient was taking tylenol which did not help his pain prompting the patient to ask his daughter to take him to the hospital. ON admission, he has no fever or Leukocytosis. The Xray of Right foot shows no Osteomyelitis but MRI of Right foot shows Lateral soft tissue wound with marrow signal abnormality throughout the fifth metatarsal which is consistent of Osteomyelitis. He has seen by Podiatry and wound culture has sent, Zosyn and Vancomycin has started. The ID consult requested to assist with further evaluation and antibiotic management.     # Right Fifth metatarsal DFU with drainage and Osteomyelitis- wound cx grew Enterococcus, Aeromonas and Klebsiella - Zosyn is the ideal to cover All organisms but kidney function is worsening, hence change to Unasyn and Levaquin until kidney function is improved - The pathology shows Bone with acute osteomyelitis and necrotic periosteal tissue.   # OREN- s/p urinary retention -s/p ibrahim catheter - s/p discontinue ACEI  # RLL pneumonia- most likely Aspiration, S/p Vomiting - On Unasyn  # Large bowel distension  # Candiduria- 9/22/21  # Pulmonary edema with bilateral moderate to large pleural effusions- on CT chest, 10/5/21    would recommend:    1. NPO after midnight for IR guided pigtail placement with Bacterial, Fungal and AFB culture  2. Supplemental oxygenation and bronchodilator  as needed  3. Monitor  kidney function and c/w adjusted doses of Levaquin and  Unasyn as per crcl  4. Wound care as per Podiatry  5. Aspiration precaution  6. May change to Zosyn and oral fluconazole on discharge to continue until 11/3/21 to cover Osteomyelitis    d/w ICU team     Attending Attestation:    Spent more than 45 minutes on total encounter, more than 50 % of the visit was spent counseling and/or coordinating care by the Attending physician.

## 2021-10-06 NOTE — SWALLOW VFSS/MBS ASSESSMENT ADULT - SLP PRECAUTIONS/LIMITATIONS: VISION
Grossly WFL, as Pt was able to see items directly in front of face/within functional limits
Patient expressed no known problems or needs

## 2021-10-06 NOTE — PROGRESS NOTE ADULT - ASSESSMENT
76 year old male with bilateral pleural effusions, unsuccessful placement at bedside with US    - plan for IR pigtail   - supportive care per ICU   - discussed with Dr. Candelaria  - will follow

## 2021-10-06 NOTE — CONSULT NOTE ADULT - SUBJECTIVE AND OBJECTIVE BOX
Patient is a 76 year old male from home lives with his daughter and with PMH of poorly controlled IDDM Hgb A1C 11.4 on insulin, HTN, HLD, stage III CKD, CAD s/p CABG and recent partial amputation to right 5th digit (8/19/21) who presented to ED with c/o right foot pain associated with discharge and foul odor. No post op follow up. Came to the ED due to sub optimal pain control. No fever, chest, pain, palpitations, nausea, vomiting, diarrhea (17 Sep 2021 01:11)    INTERVAL HPI/ HOSPITAL COURSE   MRI confirms suspected osteomyelitis of the R foot - patient followed by podiatry and ID; and underwent debridement and wound VAC placement on R foot. Surgical pathology resulted OM, and wound culture resulting Enterococcus Aeromonal and Klebsiella- treated initially with Zosyn. He developed OREN likely mixed 2/2 to a mixed etiology with prerenal from active infection/ pulmonary edema, intrarenal due to toxicity from IV antibiotics and post renal from urinary retention, Nephrology consulted for optimization of kidney function. IV antibiotic regimen switched to Unasyn and Levaquin, sensitive for cultured organisms; requiring 6 weeks of IV antibiotics.     Patient then developed Pulmonary Edema and was treated with IV lasix. Cardiology consulted and recommending SABIHA with L/R heart cath once infection clears and medically stable. Hospital course further complicated by abdominal distention and constipation for which he received lactulose and had vomiting. AXR shows distended loops of bowel for which NG tube was placed which the patient removed overnight 9/26 - re-attempts to replace NG tube were unsuccessful as patient was non-cooperative.     The patient went on to develop worsening respiratory distress sats 80s% SPO2 on 10L NC : CXR showed possible RLL PNA. likely aspiration. CT abdomen deferred for now as patient is unstable and will not tolerate lying flat. CT chest done showing R>L pulmonary edema vs Pneumonia;. Pulm (DR. Xavier) consulted for possible thoracentesis, and recommended to start albumin + lasix. ICU was consulted for worsening respiratory status. Surgery consulted for pigtail placement, however, inadequate visualization of pocket.  Patient denies any current trouble breathing, chest pain, or cough.     IR was consulted for Chest pigtail catheter placement       Vital Signs Last 24 Hrs  T(C): 36.7 (06 Oct 2021 12:00), Max: 36.7 (06 Oct 2021 12:00)  T(F): 98.1 (06 Oct 2021 12:00), Max: 98.1 (06 Oct 2021 12:00)  HR: 74 (06 Oct 2021 12:00) (66 - 81)  BP: 118/54 (06 Oct 2021 12:00) (110/60 - 124/54)  BP(mean): 68 (06 Oct 2021 12:00) (66 - 80)  RR: 26 (06 Oct 2021 12:00) (13 - 32)  SpO2: 93% (06 Oct 2021 12:00) (85% - 98%)    LABS:                        8.9    7.24  )-----------( 280      ( 06 Oct 2021 06:36 )             27.2     06 Oct 2021 06:36    144    |  104    |  27     ----------------------------<  96     3.3     |  24     |  2.02     Ca    8.5        06 Oct 2021 06:36  Phos  3.8       06 Oct 2021 06:36  Mg     2.3       06 Oct 2021 06:36    TPro  6.9    /  Alb  2.5    /  TBili  0.6    /  DBili  x      /  AST  26     /  ALT  16     /  AlkPhos  97     06 Oct 2021 06:36    < from: CT Chest No Cont (10.05.21 @ 14:07) >  FINDINGS:    LUNGS AND AIRWAYS: Patent central airways.  Intralobular septal thickening and groundglass opacity secondary to pulmonary edema. Bilateral lower lobe compressive atelectasis.  PLEURA: Moderate to large bilateral pleural effusions.  MEDIASTINUM AND HERBERT: Enlarged right lower paratracheal lymph nodes measuringup to 1.4 cm short axis. Limited evaluation of the herbert without IV contrast.  VESSELS: Coronary artery calcifications versus stents. Mild aortic atherosclerosis. Left PICC line catheter tip at the cavoatrial junction.  HEART: Heart size is normal. CABG. Aortic root calcifications. No pericardial effusion.  CHEST WALL AND LOWER NECK: Median sternotomy. Bilateral gynecomastia. Left thyroid lobe not visualized.  VISUALIZED UPPER ABDOMEN: Mildly distended gallbladder with cholelithiasis.  BONES: Thoracic degenerative changes.    IMPRESSION:  Pulmonary edema with bilateral moderate to large pleural effusions. Underlying bilateral lower lobe compressive atelectasis versus pneumonia.    Mildly enlarged mediastinal lymph nodes, possibly reactive.        --- End of Report ---      < end of copied text >    MEDICATIONS  (STANDING):  albumin human 25% IVPB 50 milliLiter(s) IV Intermittent every 6 hours  ampicillin/sulbactam  IVPB 3 Gram(s) IV Intermittent every 6 hours  aspirin  chewable 81 milliGRAM(s) Oral daily  atorvastatin 80 milliGRAM(s) Oral at bedtime  chlorhexidine 2% Cloths 1 Application(s) Topical <User Schedule>  cyanocobalamin 1000 MICROGram(s) Oral daily  ergocalciferol 65647 Unit(s) Oral <User Schedule>  ferrous    sulfate 325 milliGRAM(s) Oral daily  finasteride 5 milliGRAM(s) Oral daily  fluconAZOLE IVPB      fluconAZOLE IVPB 100 milliGRAM(s) IV Intermittent every 24 hours  furosemide   Injectable 40 milliGRAM(s) IV Push every 6 hours  heparin   Injectable 5000 Unit(s) SubCutaneous every 8 hours  insulin lispro (ADMELOG) corrective regimen sliding scale   SubCutaneous Before meals and at bedtime  levoFLOXacin  Tablet 250 milliGRAM(s) Oral every 24 hours  metoprolol succinate  milliGRAM(s) Oral daily  NIFEdipine XL 60 milliGRAM(s) Oral daily  pantoprazole    Tablet 40 milliGRAM(s) Oral before breakfast  polyethylene glycol 3350 17 Gram(s) Oral daily  senna 2 Tablet(s) Oral at bedtime  tamsulosin 0.4 milliGRAM(s) Oral at bedtime    MEDICATIONS  (PRN):  ondansetron Injectable 4 milliGRAM(s) IV Push three times a day PRN Nausea and/or Vomiting  sodium chloride 0.9% lock flush 10 milliLiter(s) IV Push every 1 hour PRN Pre/post blood products, medications, blood draw, and to maintain line patency

## 2021-10-06 NOTE — SWALLOW VFSS/MBS ASSESSMENT ADULT - ORAL PHASE COMMENTS
Base-of-tongue pumping & reduced base of tongue retraction noted. trace oral stasis Base-of-tongue pumping & reduced base of tongue retraction noted. Moderate oral stasis s/p 1st swallow.

## 2021-10-06 NOTE — PROGRESS NOTE ADULT - SUBJECTIVE AND OBJECTIVE BOX
Patient is a 76y old  Male who presents with a chief complaint of R Foot Pain (06 Oct 2021 12:05)    PATIENT IS SEEN AND EXAMINED IN MEDICAL FLOOR.  JOCELYN [    ]    MADDY [   ]      GT [   ]    ALLERGIES:  No Known Allergies      Daily     Daily Weight in k.1 (06 Oct 2021 07:00)    VITALS:    Vital Signs Last 24 Hrs  T(C): 36.7 (06 Oct 2021 12:00), Max: 36.7 (06 Oct 2021 12:00)  T(F): 98.1 (06 Oct 2021 12:00), Max: 98.1 (06 Oct 2021 12:00)  HR: 74 (06 Oct 2021 12:00) (66 - 81)  BP: 118/54 (06 Oct 2021 12:00) (110/60 - 124/54)  BP(mean): 68 (06 Oct 2021 12:00) (66 - 80)  RR: 26 (06 Oct 2021 12:00) (13 - 32)  SpO2: 93% (06 Oct 2021 12:00) (85% - 98%)    LABS:    CBC Full  -  ( 06 Oct 2021 06:36 )  WBC Count : 7.24 K/uL  RBC Count : 2.98 M/uL  Hemoglobin : 8.9 g/dL  Hematocrit : 27.2 %  Platelet Count - Automated : 280 K/uL  Mean Cell Volume : 91.3 fl  Mean Cell Hemoglobin : 29.9 pg  Mean Cell Hemoglobin Concentration : 32.7 gm/dL  Auto Neutrophil # : 5.52 K/uL  Auto Lymphocyte # : 1.04 K/uL  Auto Monocyte # : 0.49 K/uL  Auto Eosinophil # : 0.13 K/uL  Auto Basophil # : 0.04 K/uL  Auto Neutrophil % : 76.1 %  Auto Lymphocyte % : 14.4 %  Auto Monocyte % : 6.8 %  Auto Eosinophil % : 1.8 %  Auto Basophil % : 0.6 %      10-    144  |  104  |  27<H>  ----------------------------<  96  3.3<L>   |  24  |  2.02<H>    Ca    8.5      06 Oct 2021 06:36  Phos  3.8     10-06  Mg     2.3     10-    TPro  6.9  /  Alb  2.5<L>  /  TBili  0.6  /  DBili  x   /  AST  26  /  ALT  16  /  AlkPhos  97  10-06    CAPILLARY BLOOD GLUCOSE      POCT Blood Glucose.: 110 mg/dL (06 Oct 2021 05:32)  POCT Blood Glucose.: 110 mg/dL (05 Oct 2021 22:16)  POCT Blood Glucose.: 111 mg/dL (05 Oct 2021 21:00)  POCT Blood Glucose.: 133 mg/dL (05 Oct 2021 16:59)        LIVER FUNCTIONS - ( 06 Oct 2021 06:36 )  Alb: 2.5 g/dL / Pro: 6.9 g/dL / ALK PHOS: 97 U/L / ALT: 16 U/L DA / AST: 26 U/L / GGT: x           Creatinine Trend: 2.02<--, 2.11<--, 2.17<--, 2.13<--, 1.70<--, 1.49<--  I&O's Summary    05 Oct 2021 07:01  -  06 Oct 2021 07:00  --------------------------------------------------------  IN: 260 mL / OUT: 1375 mL / NET: -1115 mL        ABG - ( 06 Oct 2021 03:48 )  pH, Arterial: 7.46  pH, Blood: x     /  pCO2: 32    /  pO2: 56    / HCO3: 23    / Base Excess: -0.3  /  SaO2: 89                  .Tissue Right 5th toe r/o osteomyeltis   @ 13:54   No growth at 5 days  --    No polymorphonuclear cells seen per low power field  No organisms seen per oil power field      .Blood Blood-Venous   @ 10:28   No Growth Final  --  --      .Surgical Swab right foot wound   @ 21:42   Culture yields >4 types of aerobic and/or anaerobic bacteria  Call client services within 7 days if further workup is clinically  indicated. Culture includes  Rare Aeromonas hydrophila/caviae  Few Klebsiella oxytoca/Raoutella ornithinolytica  Few Enterococcus faecalis  Few Streptococcus agalactiae (Group B) isolated  Group B streptococci are susceptible to ampicillin,  penicillin and cefazolin, but may be resistant to  erythromycin and clindamycin.  Recommendations for intrapartum prophylaxis for Group B  streptococci are penicillin or ampicillin.  --  Aeromonas hydrophila/caviae  Klebsiella oxytoca /Raoutella ornithinolytica  Enterococcus faecalis      Bone R 5th metatarsal bone clean ma   @ 03:59   No growth at 5 days  --    No polymorphonuclear leukocytes seen per low power field  No organisms seen per oil power field      .Blood Blood-Venous   @ 21:09   No Growth Final  --  --      .Surgical Swab Right foot plantar wound   @ 21:08   Moderate Streptococcus agalactiae (Group B) isolated  Group B streptococci are susceptible to ampicillin,  penicillin and cefazolin, but may be resistant to  erythromycin and clindamycin.  Recommendations for intrapartum prophylaxis for Group B  streptococci are penicillin or ampicillin.  Moderate Bacteroides fragilis "Susceptibilities not performed"  Normal skin filiberto isolated  --  --          MEDICATIONS:    MEDICATIONS  (STANDING):  albumin human 25% IVPB 50 milliLiter(s) IV Intermittent every 6 hours  ampicillin/sulbactam  IVPB 3 Gram(s) IV Intermittent every 6 hours  aspirin  chewable 81 milliGRAM(s) Oral daily  atorvastatin 80 milliGRAM(s) Oral at bedtime  chlorhexidine 2% Cloths 1 Application(s) Topical <User Schedule>  cyanocobalamin 1000 MICROGram(s) Oral daily  ergocalciferol 71524 Unit(s) Oral <User Schedule>  ferrous    sulfate 325 milliGRAM(s) Oral daily  finasteride 5 milliGRAM(s) Oral daily  fluconAZOLE IVPB      fluconAZOLE IVPB 100 milliGRAM(s) IV Intermittent every 24 hours  furosemide   Injectable 40 milliGRAM(s) IV Push every 6 hours  heparin   Injectable 5000 Unit(s) SubCutaneous every 8 hours  insulin lispro (ADMELOG) corrective regimen sliding scale   SubCutaneous Before meals and at bedtime  levoFLOXacin  Tablet 250 milliGRAM(s) Oral every 24 hours  metoprolol succinate  milliGRAM(s) Oral daily  NIFEdipine XL 60 milliGRAM(s) Oral daily  pantoprazole    Tablet 40 milliGRAM(s) Oral before breakfast  polyethylene glycol 3350 17 Gram(s) Oral daily  senna 2 Tablet(s) Oral at bedtime  tamsulosin 0.4 milliGRAM(s) Oral at bedtime      MEDICATIONS  (PRN):  ondansetron Injectable 4 milliGRAM(s) IV Push three times a day PRN Nausea and/or Vomiting  sodium chloride 0.9% lock flush 10 milliLiter(s) IV Push every 1 hour PRN Pre/post blood products, medications, blood draw, and to maintain line patency      REVIEW OF SYSTEMS:                           ALL ROS DONE [ X   ]    CONSTITUTIONAL:  LETHARGIC [   ], FEVER [   ], UNRESPONSIVE [   ]  CVS:  CP  [   ], SOB, [   ], PALPITATIONS [   ], DIZZYNESS [   ]  RS: COUGH [   ], SPUTUM [   ]  GI: ABDOMINAL PAIN [   ], NAUSEA [   ], VOMITINGS [   ], DIARRHEA [   ], CONSTIPATION [   ]  :  DYSURIA [   ], NOCTURIA [   ], INCREASED FREQUENCY [   ], DRIBLING [   ],  SKELETAL: PAINFUL JOINTS [   ], SWOLLEN JOINTS [   ], NECK ACHE [   ], LOW BACK ACHE [   ],  SKIN : ULCERS [   ], RASH [   ], ITCHING [   ]  CNS: HEAD ACHE [   ], DOUBLE VISION [   ], BLURRED VISION [   ], AMS / CONFUSION [   ], SEIZURES [   ], WEAKNESS [   ],TINGLING / NUMBNESS [   ]    PHYSICAL EXAMINATION:  GENERAL APPEARANCE: NO DISTRESS  HEENT:  NO PALLOR, NO  JVD,  NO   NODES, NECK SUPPLE  CVS: S1 +, S2 +,   RS: AEEB,  OCCASIONAL  RALES +,   NO RONCHI, CRACKLES +  ABD: SOFT, NT, NO, BS +       EXT: NO PE  SKIN: WARM,   RIGHT FOOT WRAPPED W/ WOUND VAC IN PLACE  SKELETAL:  ROM ACCEPTABLE  CNS:  AAO X  2     RADIOLOGY :    EXAM:  CT ABDOMEN AND PELVIS OC                            PROCEDURE DATE:  2021          INTERPRETATION:  CLINICAL INFORMATION: Small bowel obstruction.    COMPARISON: None.    CONTRAST/COMPLICATIONS:  IV Contrast: NONE  Oral Contrast: Omnipaque 300  Complications: None reported at time of study completion    PROCEDURE:  CT of the Abdomen and Pelvis was performed.  Sagittal and coronal reformats were performed.    FINDINGS:  LOWER CHEST: Small bilateral pleural effusions with compressiveatelectasis of both lower lobes.    LIVER: Within normal limits.  BILE DUCTS: Normal caliber.  GALLBLADDER: Distended. Small layering gallstones.  SPLEEN: Within normal limits.  PANCREAS: Within normal limits.  ADRENALS: Within normal limits.  KIDNEYS/URETERS: No hydronephrosis. Nonobstructing left upper pole calculus, measuring 0.6 cm.    BLADDER: Urinary bladder contains air and a Castañeda catheter balloon.  REPRODUCTIVE ORGANS: Prostate is not enlarged.    BOWEL: No bowel obstruction. Appendix isnormal. Colonic diverticulosis.  PERITONEUM: Mild presacral fluid.  VESSELS: Atherosclerotic changes.  RETROPERITONEUM/LYMPH NODES: No lymphadenopathy.  ABDOMINAL WALL: Within normal limits.  BONES: Degenerative changes. Sternotomy.    IMPRESSION:  No acute intra-abdominal pathology.    Small bilateral pleural effusions with compressive atelectasis of both lower lobes.    ====================================================    EXAM:  MR FOOT RT                            PROCEDURE DATE:  2021          INTERPRETATION:  Clinical Information: Recent fifth metatarsal head resection now with fifth digit pain, swelling and foul discharge.    Comparison: Radiographs of the right foot from 2021 and MRI the right foot from 2021.    Technique:  MRI of the right midfoot and forefoot.  Intravenous Contrast: None.    Findings:    There is a resection at the mid aspect of the fifth metatarsal shaft. There is a lateral soft tissue wound beginning at the level of the amputation which extends distally. There is susceptibility artifact in the region of the amputation consistent with postoperative change. There is hyperintense T2 marrow signal throughout the remaining fifth metatarsal and within the adjacent fourth metatarsal head which is nonspecific and could be related to recent postoperative changes although osteomyelitis is suspected.    There is edema and mild atrophy within the plantar muscles of the foot. There is minimal spurring at the first metatarsophalangeal and hallux sesamoid articulations.    Impression:  Resection at the mid aspect of the fifth metatarsal. Lateral soft tissue wound with marrow signal abnormality throughout the fifth metatarsal and within the adjacent fourth metatarsal head which is nonspecific in the setting of recent surgery although osteomyelitis is suspected.        ASSESSMENT :     Headache    HTN (hypertension)    HLD (hyperlipidemia)    DM (diabetes mellitus)    CAD (coronary artery disease)    Glaucoma, angle-closure    [place] (hard of hearing)    No significant past surgical history    S/P CABG (coronary artery bypass graft)    History of thyroid surgery        PLAN:  HPI:  76M from home, lives with daughter w/ PMH DM on insulin, HTN, HLD, CAD, PSH R partial 5th ray amputation 2021 p/w R foot pain x1 day associated with foul smelling discharge. Pt with sharp pain on the R lateral foot. As per daughter, pt was taking tylenol which did not help his pain prompting the patient to ask his daughter to take him to the hospital. Pt is a poor historian. Daughter states that the patient did not see his podiatrist after the surgery. No fever, chest, pain, palpitations, nausea, vomiting, diarrhea (17 Sep 2021 01:11)    # PATIENT FOR D/C TO [facility] FOR STEVAN - D/W CM TEAM 10/2, AWAITING AUTHORIZATION / ANNETTA PER CM TEAM.    # SUSPECT RIGHT FOOT OSTEOMYELITIS S/P RIGHT 5TH RAY RESECTION [] AND S/P DEBRIDEMENT, GRAFT APPLICATION, BONE BX AND WOUND VAC APPLICATION   ** RECENT ADMISSION FOR RIGHT DIABETIC FOOT ULCER, CELLULITIS, OSTEOMYELITIS RIGHT 5th METATARSAL S/P RIGHT 5TH PARTIAL RAY AMPUTATION [] - BONE PATHOLOGY W/ CLEAN MARGINS    - S/P VANCOMYCIN AND ZOSYN ; NOW ON UNASYN AND LEVAQUIN GIVEN OREN; PER ID SWITCH TO ZOSYN UNTIL 11/3  - RECENTLY HAD SIMON W/ MILD PAD  - REVIEWED WOUND CX [ ENTEROCOCCUS, AEROMONAS, KLEBSIELLA] AND BCX  - BONE BX - acute osteomyelitis and necrotic periosteal tissue.   - ID CONSULT, PODIATRY CONSULT    - PICC LINE PLACED TODAY TO COMPLETE 6 WEEKS OF ANTIBIOTICS - PER ID SWITCH TO ZOSYN UNTIL 11/3    # ACUTE HYPOXIC RESPIRATORY FAILURE SUSPECT S/T PULMONARY EDEMA IN THE SETTING OF SEVERE AORTIC STENOSIS + ASPIRATION PNA - ON LEVAQUIN, STARTED LASIX, CARDIOLOGY CONSULT IN PROGRESS  - S/P CRITICAL CARE CONSULT  - S/P LASIX ON 10/1 - 10/2, 10/4 AND 10/5  - CT CHEST W/ BILATERAL PLEURAL EFFUSIONS [10/5] - PULMONOLOGY CONSULT FOR POSSIBLE THORACENTESIS & WILL CONTINUE LASIX     - TODAY WITH WORSENING HYPOXIA - RECHECKED CXR AND GIVING LASIX    # FUNGURIA - ON FLUCONAZOLE    # BOWEL DISTENTION - ? ILEUS - OPTIMIZING ELECTROLYTES - IMPROVED  - SURGERY CONSULT IN PROGRESS  - PASSED 2 BMS ON     # ASPIRATION EVENT WHEN PATIENT REMOVED NGT - ON LEVAQUIN, ID CONSULT IN PROGRESS    # CHOLELITHIASIS - TRENDING LFTS, RUQ U/S - CHOLELITHIASIS, SURGERY CONSULT IN PROGRESS  - GI CONSULT IN PROGRESS - LESS SUSPICIOUS FOR CHOLECYSTITIS    # DYSPEPSIA - PLACED ON PPI BID WITH IMPROVEMENT, UNDERWENT ST EVAL     # ELEVATED TROPONINS - NSTEMI - ? DEMAND - WILL NEED ISCHEMIC EVAL ONCE ACUTE INFECTION RESOLVES PER CARDIOLOGY, CARDIOLOGY CONSULT IN PROGRESS  - ON ASA, STATIN, BB    # OREN ON CKD - S/T ATN AND URINARY RETENTION - S/P IVF, NOW W/ CASTAÑEDA, NEPHROLOGY CONUSLT IN PROGRESS  - ON FLOMAX, ADDED FINASTERIDE    - WITH WORSENING RENAL FXN - NOTED URINARY RETENTION [10/4] - REPLACED CASTAÑEDA    # MEDICAL CLEARANCE FOR SURGERY - RCRI - 2 - 10.1 % 30 DAY RISK OF DEATH, MI OR CARDIAC ARREST  - CARDIOLOGY CONSULT     # DM -  HBA1C - 11  - LANTUS, SSI + FS    # CAD S/P CABG - PLACED ON ASA, STATIN, BB  - ECHO - SEVERE AORTIC STENOSIS, SEVERE CONCENTRIC LVH, G1DD, MILD GA  - CARDIOLOGY CONSULT - DR. BASSETT   - MAY NEED ISCHEMIC EVAL ONCE ACUTE ILLNESS RESOLVES    # HTN - ON METOPROLOL, NICARDIPINE    # SEVERE, ASYMPTOMATIC, STENOSIS - ONCE ACUTE ILLNESS RESOLVES, WILL LIKELY NEED ISCHEMIC WORKUP, SABIHA TO EVALUATE FURTHER. D/W PATIENT, DAUGHTER AND CARDIOLOGY TEAM [PREVIOUSLY SAW DR. BASSETT]    # VITAMIN D DEFICIENCY - ON CHOLECALCIFEROL    # HLD - STATIN    # CASE DISCUSSED AT LENGTH WITH PATIENT AND DAUGHTER, BIRDIE ROBERT AT BEDSIDE AND VIA PHONE @ [phone] [10/1]  # PATIENT AND DAUGHTER REFUSED [facility] ON PREVIOUS ADMISSION, PREVIOUSLY WISHED FOR PATIENT RETURN HOME W/ HOME PT; HOWEVER NOW, DAUGHTER WISHES FOR PATIENT TO GO TO [facility] - [facility], AS PATIENT'S WIFE IS CURRENTLY ADMITTED THERE    # GI AND DVT PPX   Patient is a 76y old  Male who presents with a chief complaint of R Foot Pain (06 Oct 2021 12:05)    PATIENT IS SEEN AND EXAMINED IN MEDICAL FLOOR.    ALLERGIES:  No Known Allergies      Daily     Daily Weight in k.1 (06 Oct 2021 07:00)    VITALS:    Vital Signs Last 24 Hrs  T(C): 36.7 (06 Oct 2021 12:00), Max: 36.7 (06 Oct 2021 12:00)  T(F): 98.1 (06 Oct 2021 12:00), Max: 98.1 (06 Oct 2021 12:00)  HR: 74 (06 Oct 2021 12:00) (66 - 81)  BP: 118/54 (06 Oct 2021 12:00) (110/60 - 124/54)  BP(mean): 68 (06 Oct 2021 12:00) (66 - 80)  RR: 26 (06 Oct 2021 12:00) (13 - 32)  SpO2: 93% (06 Oct 2021 12:00) (85% - 98%)    LABS:    CBC Full  -  ( 06 Oct 2021 06:36 )  WBC Count : 7.24 K/uL  RBC Count : 2.98 M/uL  Hemoglobin : 8.9 g/dL  Hematocrit : 27.2 %  Platelet Count - Automated : 280 K/uL  Mean Cell Volume : 91.3 fl  Mean Cell Hemoglobin : 29.9 pg  Mean Cell Hemoglobin Concentration : 32.7 gm/dL  Auto Neutrophil # : 5.52 K/uL  Auto Lymphocyte # : 1.04 K/uL  Auto Monocyte # : 0.49 K/uL  Auto Eosinophil # : 0.13 K/uL  Auto Basophil # : 0.04 K/uL  Auto Neutrophil % : 76.1 %  Auto Lymphocyte % : 14.4 %  Auto Monocyte % : 6.8 %  Auto Eosinophil % : 1.8 %  Auto Basophil % : 0.6 %      10-    144  |  104  |  27<H>  ----------------------------<  96  3.3<L>   |  24  |  2.02<H>    Ca    8.5      06 Oct 2021 06:36  Phos  3.8     10-06  Mg     2.3     10-    TPro  6.9  /  Alb  2.5<L>  /  TBili  0.6  /  DBili  x   /  AST  26  /  ALT  16  /  AlkPhos  97  10-06    CAPILLARY BLOOD GLUCOSE      POCT Blood Glucose.: 110 mg/dL (06 Oct 2021 05:32)  POCT Blood Glucose.: 110 mg/dL (05 Oct 2021 22:16)  POCT Blood Glucose.: 111 mg/dL (05 Oct 2021 21:00)  POCT Blood Glucose.: 133 mg/dL (05 Oct 2021 16:59)        LIVER FUNCTIONS - ( 06 Oct 2021 06:36 )  Alb: 2.5 g/dL / Pro: 6.9 g/dL / ALK PHOS: 97 U/L / ALT: 16 U/L DA / AST: 26 U/L / GGT: x           Creatinine Trend: 2.02<--, 2.11<--, 2.17<--, 2.13<--, 1.70<--, 1.49<--  I&O's Summary    05 Oct 2021 07:01  -  06 Oct 2021 07:00  --------------------------------------------------------  IN: 260 mL / OUT: 1375 mL / NET: -1115 mL        ABG - ( 06 Oct 2021 03:48 )  pH, Arterial: 7.46  pH, Blood: x     /  pCO2: 32    /  pO2: 56    / HCO3: 23    / Base Excess: -0.3  /  SaO2: 89                  .Tissue Right 5th toe r/o osteomyeltis   @ 13:54   No growth at 5 days  --    No polymorphonuclear cells seen per low power field  No organisms seen per oil power field      .Blood Blood-Venous   @ 10:28   No Growth Final  --  --      .Surgical Swab right foot wound   @ 21:42   Culture yields >4 types of aerobic and/or anaerobic bacteria  Call client services within 7 days if further workup is clinically  indicated. Culture includes  Rare Aeromonas hydrophila/caviae  Few Klebsiella oxytoca/Raoutella ornithinolytica  Few Enterococcus faecalis  Few Streptococcus agalactiae (Group B) isolated  Group B streptococci are susceptible to ampicillin,  penicillin and cefazolin, but may be resistant to  erythromycin and clindamycin.  Recommendations for intrapartum prophylaxis for Group B  streptococci are penicillin or ampicillin.  --  Aeromonas hydrophila/caviae  Klebsiella oxytoca /Raoutella ornithinolytica  Enterococcus faecalis      Bone R 5th metatarsal bone clean ma   @ 03:59   No growth at 5 days  --    No polymorphonuclear leukocytes seen per low power field  No organisms seen per oil power field      .Blood Blood-Venous   @ 21:09   No Growth Final  --  --      .Surgical Swab Right foot plantar wound   @ 21:08   Moderate Streptococcus agalactiae (Group B) isolated  Group B streptococci are susceptible to ampicillin,  penicillin and cefazolin, but may be resistant to  erythromycin and clindamycin.  Recommendations for intrapartum prophylaxis for Group B  streptococci are penicillin or ampicillin.  Moderate Bacteroides fragilis "Susceptibilities not performed"  Normal skin filiberto isolated  --  --          MEDICATIONS:    MEDICATIONS  (STANDING):  albumin human 25% IVPB 50 milliLiter(s) IV Intermittent every 6 hours  ampicillin/sulbactam  IVPB 3 Gram(s) IV Intermittent every 6 hours  aspirin  chewable 81 milliGRAM(s) Oral daily  atorvastatin 80 milliGRAM(s) Oral at bedtime  chlorhexidine 2% Cloths 1 Application(s) Topical <User Schedule>  cyanocobalamin 1000 MICROGram(s) Oral daily  ergocalciferol 21837 Unit(s) Oral <User Schedule>  ferrous    sulfate 325 milliGRAM(s) Oral daily  finasteride 5 milliGRAM(s) Oral daily  fluconAZOLE IVPB      fluconAZOLE IVPB 100 milliGRAM(s) IV Intermittent every 24 hours  furosemide   Injectable 40 milliGRAM(s) IV Push every 6 hours  heparin   Injectable 5000 Unit(s) SubCutaneous every 8 hours  insulin lispro (ADMELOG) corrective regimen sliding scale   SubCutaneous Before meals and at bedtime  levoFLOXacin  Tablet 250 milliGRAM(s) Oral every 24 hours  metoprolol succinate  milliGRAM(s) Oral daily  NIFEdipine XL 60 milliGRAM(s) Oral daily  pantoprazole    Tablet 40 milliGRAM(s) Oral before breakfast  polyethylene glycol 3350 17 Gram(s) Oral daily  senna 2 Tablet(s) Oral at bedtime  tamsulosin 0.4 milliGRAM(s) Oral at bedtime      MEDICATIONS  (PRN):  ondansetron Injectable 4 milliGRAM(s) IV Push three times a day PRN Nausea and/or Vomiting  sodium chloride 0.9% lock flush 10 milliLiter(s) IV Push every 1 hour PRN Pre/post blood products, medications, blood draw, and to maintain line patency      REVIEW OF SYSTEMS:                           ALL ROS DONE [ X   ]    CONSTITUTIONAL:  LETHARGIC [   ], FEVER [   ], UNRESPONSIVE [   ]  CVS:  CP  [   ], SOB, [   ], PALPITATIONS [   ], DIZZYNESS [   ]  RS: COUGH [   ], SPUTUM [   ]  GI: ABDOMINAL PAIN [   ], NAUSEA [   ], VOMITINGS [   ], DIARRHEA [   ], CONSTIPATION [   ]  :  DYSURIA [   ], NOCTURIA [   ], INCREASED FREQUENCY [   ], DRIBLING [   ],  SKELETAL: PAINFUL JOINTS [   ], SWOLLEN JOINTS [   ], NECK ACHE [   ], LOW BACK ACHE [   ],  SKIN : ULCERS [   ], RASH [   ], ITCHING [   ]  CNS: HEAD ACHE [   ], DOUBLE VISION [   ], BLURRED VISION [   ], AMS / CONFUSION [   ], SEIZURES [   ], WEAKNESS [   ],TINGLING / NUMBNESS [   ]    PHYSICAL EXAMINATION:  GENERAL APPEARANCE: NO DISTRESS  HEENT:  NO PALLOR, NO  JVD,  NO   NODES, NECK SUPPLE  CVS: S1 +, S2 +,   RS: AEEB,  OCCASIONAL  RALES +,   NO RONCHI, CRACKLES +  ABD: SOFT, NT, NO, BS +       EXT: NO PE  SKIN: WARM,   RIGHT FOOT WRAPPED W/ WOUND VAC IN PLACE  SKELETAL:  ROM ACCEPTABLE  CNS:  AAO X  2     RADIOLOGY :    EXAM:  CT ABDOMEN AND PELVIS OC                            PROCEDURE DATE:  2021          INTERPRETATION:  CLINICAL INFORMATION: Small bowel obstruction.    COMPARISON: None.    CONTRAST/COMPLICATIONS:  IV Contrast: NONE  Oral Contrast: Omnipaque 300  Complications: None reported at time of study completion    PROCEDURE:  CT of the Abdomen and Pelvis was performed.  Sagittal and coronal reformats were performed.    FINDINGS:  LOWER CHEST: Small bilateral pleural effusions with compressiveatelectasis of both lower lobes.    LIVER: Within normal limits.  BILE DUCTS: Normal caliber.  GALLBLADDER: Distended. Small layering gallstones.  SPLEEN: Within normal limits.  PANCREAS: Within normal limits.  ADRENALS: Within normal limits.  KIDNEYS/URETERS: No hydronephrosis. Nonobstructing left upper pole calculus, measuring 0.6 cm.    BLADDER: Urinary bladder contains air and a Castañeda catheter balloon.  REPRODUCTIVE ORGANS: Prostate is not enlarged.    BOWEL: No bowel obstruction. Appendix isnormal. Colonic diverticulosis.  PERITONEUM: Mild presacral fluid.  VESSELS: Atherosclerotic changes.  RETROPERITONEUM/LYMPH NODES: No lymphadenopathy.  ABDOMINAL WALL: Within normal limits.  BONES: Degenerative changes. Sternotomy.    IMPRESSION:  No acute intra-abdominal pathology.    Small bilateral pleural effusions with compressive atelectasis of both lower lobes.    ====================================================    EXAM:  MR FOOT RT                            PROCEDURE DATE:  2021          INTERPRETATION:  Clinical Information: Recent fifth metatarsal head resection now with fifth digit pain, swelling and foul discharge.    Comparison: Radiographs of the right foot from 2021 and MRI the right foot from 2021.    Technique:  MRI of the right midfoot and forefoot.  Intravenous Contrast: None.    Findings:    There is a resection at the mid aspect of the fifth metatarsal shaft. There is a lateral soft tissue wound beginning at the level of the amputation which extends distally. There is susceptibility artifact in the region of the amputation consistent with postoperative change. There is hyperintense T2 marrow signal throughout the remaining fifth metatarsal and within the adjacent fourth metatarsal head which is nonspecific and could be related to recent postoperative changes although osteomyelitis is suspected.    There is edema and mild atrophy within the plantar muscles of the foot. There is minimal spurring at the first metatarsophalangeal and hallux sesamoid articulations.    Impression:  Resection at the mid aspect of the fifth metatarsal. Lateral soft tissue wound with marrow signal abnormality throughout the fifth metatarsal and within the adjacent fourth metatarsal head which is nonspecific in the setting of recent surgery although osteomyelitis is suspected.        ASSESSMENT :     Headache    HTN (hypertension)    HLD (hyperlipidemia)    DM (diabetes mellitus)    CAD (coronary artery disease)    Glaucoma, angle-closure    [place] (hard of hearing)    No significant past surgical history    S/P CABG (coronary artery bypass graft)    History of thyroid surgery        PLAN:  HPI:  76M from home, lives with daughter w/ PMH DM on insulin, HTN, HLD, CAD, PSH R partial 5th ray amputation 2021 p/w R foot pain x1 day associated with foul smelling discharge. Pt with sharp pain on the R lateral foot. As per daughter, pt was taking tylenol which did not help his pain prompting the patient to ask his daughter to take him to the hospital. Pt is a poor historian. Daughter states that the patient did not see his podiatrist after the surgery. No fever, chest, pain, palpitations, nausea, vomiting, diarrhea (17 Sep 2021 01:11)    # TRANSFERRED TO ICU FOR WORSENING HYPOXIA AND DYSPNEA    # SUSPECT RIGHT FOOT OSTEOMYELITIS S/P RIGHT 5TH RAY RESECTION [] AND S/P DEBRIDEMENT, GRAFT APPLICATION, BONE BX AND WOUND VAC APPLICATION   ** RECENT ADMISSION FOR RIGHT DIABETIC FOOT ULCER, CELLULITIS, OSTEOMYELITIS RIGHT 5th METATARSAL S/P RIGHT 5TH PARTIAL RAY AMPUTATION [] - BONE PATHOLOGY W/ CLEAN MARGINS    - S/P VANCOMYCIN AND ZOSYN ; NOW ON UNASYN AND LEVAQUIN GIVEN OREN; PER ID SWITCH TO ZOSYN UNTIL 11/3  - RECENTLY HAD SIMON W/ MILD PAD  - REVIEWED WOUND CX [ ENTEROCOCCUS, AEROMONAS, KLEBSIELLA] AND BCX  - BONE BX - acute osteomyelitis and necrotic periosteal tissue.   - ID CONSULT, PODIATRY CONSULT    - PICC LINE PLACED TODAY TO COMPLETE 6 WEEKS OF ANTIBIOTICS - PER ID SWITCH TO ZOSYN UNTIL 11/3    # ACUTE HYPOXIC RESPIRATORY FAILURE SUSPECT S/T PULMONARY EDEMA IN THE SETTING OF SEVERE AORTIC STENOSIS + ASPIRATION PNA - ON LEVAQUIN, STARTED LASIX, CARDIOLOGY CONSULT IN PROGRESS  - S/P CRITICAL CARE CONSULT  - S/P LASIX ON 10/1 - 10/2, 10/4 AND 10/5  - CT CHEST W/ BILATERAL PLEURAL EFFUSIONS [10/5] - PULMONOLOGY CONSULT FOR POSSIBLE THORACENTESIS & WILL CONTINUE LASIX   - ALSO F/U REPEAT ECHOCARDIOGRAM  - CT SURGERY WAS CONSULTED BUT UNABLE TO PLACE PIGTAIL, THUS IR CONSULT IN PROGRESS FOR PIGTAIL PLACEMENT    - TODAY WITH WORSENING HYPOXIA - RECHECKED CXR AND GIVING LASIX    # FUNGURIA - ON FLUCONAZOLE    # BOWEL DISTENTION - ? ILEUS - OPTIMIZING ELECTROLYTES - IMPROVED  - SURGERY CONSULT IN PROGRESS  - PASSED 2 BMS ON     # ASPIRATION EVENT WHEN PATIENT REMOVED NGT - ON LEVAQUIN, ID CONSULT IN PROGRESS    # CHOLELITHIASIS - TRENDING LFTS, RUQ U/S - CHOLELITHIASIS, SURGERY CONSULT IN PROGRESS  - GI CONSULT IN PROGRESS - LESS SUSPICIOUS FOR CHOLECYSTITIS    # DYSPEPSIA - PLACED ON PPI BID WITH IMPROVEMENT, UNDERWENT ST EVAL     # ELEVATED TROPONINS - NSTEMI - ? DEMAND - WILL NEED ISCHEMIC EVAL ONCE ACUTE INFECTION RESOLVES PER CARDIOLOGY, CARDIOLOGY CONSULT IN PROGRESS  - ON ASA, STATIN, BB    # OREN ON CKD - S/T ATN AND URINARY RETENTION - S/P IVF, NOW W/ CASTAÑEDA, NEPHROLOGY CONUSLT IN PROGRESS  - ON FLOMAX, ADDED FINASTERIDE    - WITH WORSENING RENAL FXN - NOTED URINARY RETENTION [10/4] - REPLACED CASTAÑEDA    # MEDICAL CLEARANCE FOR SURGERY - RCRI - 2 - 10.1 % 30 DAY RISK OF DEATH, MI OR CARDIAC ARREST  - CARDIOLOGY CONSULT     # DM -  HBA1C - 11  - LANTUS, SSI + FS    # CAD S/P CABG - PLACED ON ASA, STATIN, BB  - ECHO - SEVERE AORTIC STENOSIS, SEVERE CONCENTRIC LVH, G1DD, MILD [place]  - CARDIOLOGY CONSULT - DR. ABSSETT   - MAY NEED ISCHEMIC EVAL ONCE ACUTE ILLNESS RESOLVES    # HTN - ON METOPROLOL, NICARDIPINE    # SEVERE, ASYMPTOMATIC, STENOSIS - ONCE ACUTE ILLNESS RESOLVES, WILL LIKELY NEED ISCHEMIC WORKUP, SABIHA TO EVALUATE FURTHER. D/W PATIENT, DAUGHTER AND CARDIOLOGY TEAM [PREVIOUSLY SAW DR. BASSETT]    # VITAMIN D DEFICIENCY - ON CHOLECALCIFEROL    # HLD - STATIN    # CASE DISCUSSED AT LENGTH WITH PATIENT AND DAUGHTER, BIRDIE ROBERT AT BEDSIDE AND VIA PHONE @ [phone] [10/5] - CASE DICUSSED AT LENGTH AND ALL QUESTIONS WERE ANSWERED. DAUGHTER UNDERSTOOD THAT PROGNOSIS IS GUARDED.  # PATIENT AND DAUGHTER REFUSED [facility] ON PREVIOUS ADMISSION, PREVIOUSLY WISHED FOR PATIENT RETURN HOME W/ HOME PT; HOWEVER NOW, DAUGHTER WISHES FOR PATIENT TO GO TO [facility], AS PATIENT'S WIFE IS CURRENTLY ADMITTED THERE    # GI AND DVT PPX

## 2021-10-06 NOTE — SWALLOW VFSS/MBS ASSESSMENT ADULT - SLP PERTINENT HISTORY OF CURRENT PROBLEM
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 Pt reportedly p/w R foot pain x1 day associated with foul smelling discharge. Pt reportedly with sharp pain on the R lateral foot. As per daughter, pt was reportedly taking tylenol which did not help his pain prompting the pt to ask his daughter to take him to the hospital. Pt is a poor historian. Daughter reportedly states that the patient did not see his podiatrist after the surgery. No fever, chest, pain, palpitations, nausea, vomiting, diarrhea

## 2021-10-06 NOTE — PROGRESS NOTE ADULT - SUBJECTIVE AND OBJECTIVE BOX
Patient is seen and examined at the bed side, is afebrile.  The creatinine is trending down slowly. He is  scheduled for IR guided pigtail placement.      REVIEW OF SYSTEMS: All other review systems are negative      ALLERGIES: No Known Allergies      ICU Vital Signs Last 24 Hrs  T(C): 36.3 (06 Oct 2021 16:58), Max: 36.7 (06 Oct 2021 12:00)  T(F): 97.3 (06 Oct 2021 16:58), Max: 98.1 (06 Oct 2021 12:00)  HR: 83 (06 Oct 2021 17:00) (66 - 83)  BP: 105/53 (06 Oct 2021 17:00) (95/74 - 124/54)  BP(mean): 64 (06 Oct 2021 17:00) (62 - 80)  ABP: --  ABP(mean): --  RR: 22 (06 Oct 2021 17:00) (13 - 32)  SpO2: 93% (06 Oct 2021 17:00) (77% - 100%)      PHYSICAL EXAM:  GENERAL: Not in distress, on  oxygen via NC  CHEST/LUNG:  Not using accessory muscles  HEART: s1 and s2 present  ABDOMEN:  Mild distended   EXTREMITIES: Right foot bandage in placed   CNS: Awake, and  Alert        LABS:                        8.9    7.24  )-----------( 280      ( 06 Oct 2021 06:36 )             27.2                           10.0   7.73  )-----------( 332      ( 05 Oct 2021 06:43 )             30.3       10-06    144  |  104  |  27<H>  ----------------------------<  96  3.3<L>   |  24  |  2.02<H>    Ca    8.5      06 Oct 2021 06:36  Phos  3.8     10-06  Mg     2.3     10-06    TPro  6.9  /  Alb  2.5<L>  /  TBili  0.6  /  DBili  x   /  AST  26  /  ALT  16  /  AlkPhos  97  10-06    10-05    143  |  105  |  27<H>  ----------------------------<  111<H>  3.8   |  24  |  2.17<H>    Ca    8.8      05 Oct 2021 06:43  Phos  4.0     10-05  Mg     2.4     10-05    TPro  7.2  /  Alb  2.5<L>  /  TBili  0.5  /  DBili  x   /  AST  28  /  ALT  18  /  AlkPhos  108  10-0      09-25    139  |  107  |  41<H>  ----------------------------<  134<H>  4.1   |  21<L>  |  4.05<H>    Ca    8.6      25 Sep 2021 06:40    TPro  6.6  /  Alb  2.3<L>  /  TBili  0.5  /  DBili  x   /  AST  25  /  ALT  15  /  AlkPhos  102  09-25        CAPILLARY BLOOD GLUCOSE  POCT Blood Glucose.: 134 mg/dL (17 Sep 2021 17:11)  POCT Blood Glucose.: 128 mg/dL (17 Sep 2021 11:06)  POCT Blood Glucose.: 157 mg/dL (17 Sep 2021 04:10)      MEDICATIONS  (STANDING):    albumin human 25% IVPB 50 milliLiter(s) IV Intermittent every 6 hours  ampicillin/sulbactam  IVPB 3 Gram(s) IV Intermittent every 6 hours  atorvastatin 80 milliGRAM(s) Oral at bedtime  chlorhexidine 2% Cloths 1 Application(s) Topical <User Schedule>  cyanocobalamin 1000 MICROGram(s) Oral daily  ergocalciferol 61164 Unit(s) Oral <User Schedule>  ferrous    sulfate 325 milliGRAM(s) Oral daily  finasteride 5 milliGRAM(s) Oral daily  fluconAZOLE IVPB      fluconAZOLE IVPB 100 milliGRAM(s) IV Intermittent every 24 hours  furosemide   Injectable 40 milliGRAM(s) IV Push every 6 hours  insulin lispro (ADMELOG) corrective regimen sliding scale   SubCutaneous Before meals and at bedtime  levoFLOXacin  Tablet 250 milliGRAM(s) Oral every 24 hours  metoprolol succinate  milliGRAM(s) Oral daily  NIFEdipine XL 60 milliGRAM(s) Oral daily  pantoprazole    Tablet 40 milliGRAM(s) Oral before breakfast  phytonadione  IVPB 10 milliGRAM(s) IV Intermittent once  polyethylene glycol 3350 17 Gram(s) Oral daily  senna 2 Tablet(s) Oral at bedtime  tamsulosin 0.4 milliGRAM(s) Oral at bedtime        RADIOLOGY & ADDITIONAL TESTS:    10/5/21 CT Chest No Cont (10.05.21 @ 14:07) Pulmonary edema with bilateral moderate to large pleural effusions. Underlying bilateral lower lobe compressive atelectasis versus pneumonia.  Mildly enlarged mediastinal lymph nodes, possibly reactive.      10/2/21: Xray Chest 1 View- PORTABLE-Routine (Xray Chest 1 View- PORTABLE-Routine in AM.) (10.02.21 @ 09:46) There is persistent bilateral perihilar/basilar diffuse airspace disease and/or RIGHT effusion.  Cardiomegaly.  No interval change.    Collected Date/Time: 9/22/2021 08:42 EDT   Received Date/Time: 9/23/2021 09:29 EDT   Surgical Pathology Report - Auth (Verified)   Specimen(s) Submitted   1 Right 5th Toe Wound Debridement r/o Osteomyelitis   Final Diagnosis   Right fifth toe; wound debridement:   - Bone with acute osteomyelitis and necrotic periosteal tissue.     9/28/21: US Abdomen Upper Quadrant Right (09.28.21 @ 12:13) Cholelithiasis without evidence of acute cholecystitis. Note is made of right pleural effusion    9/27/21: CT Abdomen and Pelvis w/ Oral Cont (09.27.21 @ 15:53) >No acute intra-abdominal pathology.    Small bilateral pleural effusions with compressive atelectasis of both lower lobes.    9/26/21: Xray Chest 1 View-PORTABLE IMMEDIATE (Xray Chest 1 View-PORTABLE IMMEDIATE .) (09.26.21 @ 15:28) : Small bilateral pleural effusions and mild pulmonary edema. A right lower lobe pneumonia is not excluded.      9/26/21: Xray Abdomen 1 View Portable, IMMEDIATE (Xray Abdomen 1 View Portable, IMMEDIATE .) (09.26.21 @ 15:28) : There is a nasogastric tube with its tip in the stomach. There is gaseous distention of the colon. No pathologic calcifications are seen. The osseous structures are intact with degenerative change is present in the spine.      9/17/21 : MR Foot No Cont, Right (09.17.21 @ 13:04) : Resection at the mid aspect of the fifth metatarsal. Lateral soft tissue wound with marrow signal abnormality throughout the fifth metatarsal and within the adjacent fourth metatarsal head which is nonspecific in the setting of recent surgery although osteomyelitis is suspected.    9/16/21 : Xray Foot AP + Lateral + Oblique, Right (09.16.21 @ 15:03) : No acute finding. No plain film evidence of osteomyelitis.        MICROBIOLOGY DATA:    Urine Microscopic-Add On (NC) (09.22.21 @ 16:14)   Red Blood Cell - Urine: 0-2 /HPF   White Blood Cell - Urine: 3-5 /HPF   Bacteria: Moderate /HPF   Comment - Urine: yeast cells present   Epithelial Cells: Moderate /HPF     Culture - Tissue with Gram Stain (09.22.21 @ 13:54)   Gram Stain: No polymorphonuclear cells seen per low power field   No organisms seen per oil power field   Specimen Source: .Tissue Right 5th toe r/o osteomyeltis   Culture Results: No growth     COVID-19 David Domain Antibody (09.18.21 @ 12:55)   COVID-19 David Domain Antibody Result: >250.00:    Culture - Blood (09.17.21 @ 10:28)   Specimen Source: .Blood Blood-Peripheral   Culture Results: No growth to date.     Culture - Blood (09.17.21 @ 10:28)   Specimen Source: .Blood Blood-Venous   Culture Results: No growth to date.     Culture - Surgical Swab (09.16.21 @ 21:42)   - Amikacin: S <=16   - Amikacin: S <=16   - Amoxicillin/Clavulanic Acid: S <=8/4   - Ampicillin: R >16 These ampicillin results predict results for amoxicillin   - Ampicillin: S <=2 Predicts results to ampicillin/sulbactam, amoxacillin-clavulanate and piperacillin-tazobactam.   - Ampicillin/Sulbactam: S 8/4 Enterobacter, Citrobacter, and Serratia may develop resistance during prolonged therapy (3-4 days)   - Ampicillin/Sulbactam: R >16/8   - Aztreonam: S <=4   - Aztreonam: S <=4   - Cefazolin: R 16 Enterobacter, Citrobacter, and Serratia may develop resistance during prolonged therapy (3-4 days)   - Cefazolin: R >16   - Cefepime: S <=2   - Cefepime: S <=2   - Cefoxitin: S <=8   - Cefoxitin: S <=8   - Ceftazidime: S <=1   - Ceftriaxone: S <=1   - Ceftriaxone: S <=1 Enterobacter, Citrobacter, and Serratia may develop resistance during prolonged therapy   - Ciprofloxacin: S <=0.25   - Ciprofloxacin: S <=0.25   - Ertapenem: S <=0.5   - Gentamicin: S <=2   - Gentamicin: S <=2   - Imipenem: S <=1   - Levofloxacin: S <=0.5   - Levofloxacin: S <=0.5   - Meropenem: S <=1   - Meropenem: S <=1   - Piperacillin/Tazobactam: S <=8   - Piperacillin/Tazobactam: S <=8   - Tetra/Doxy: S 4   - Tobramycin: S <=2   - Trimethoprim/Sulfamethoxazole: S <=0.5/9.5   - Trimethoprim/Sulfamethoxazole: S <=0.5/9.5   - Vancomycin: S 2   Specimen Source: .Surgical Swab right foot wound   Culture Results:   Culture yields >4 types of aerobic and/or anaerobic bacteria   Call client services within 7 days if further workup is clinically   indicated. Culture includes   Rare Aeromonas hydrophila/caviae   Few Klebsiella oxytoca/Raoutella ornithinolytica   Few Enterococcus faecalis   Few Streptococcus agalactiae (Group B) isolated   Group B streptococci are susceptible to ampicillin,   penicillin and cefazolin, but may be resistant to   erythromycin and clindamycin.   Recommendations for intrapartum prophylaxis for Group B   streptococci are penicillin or ampicillin.   Organism Identification: Aeromonas hydrophila/caviae   Klebsiella oxytoca /Raoutella ornithinolytica   Enterococcus faecalis   Organism: Aeromonas hydrophila/caviae   Organism: Klebsiella oxytoca /Raoutella ornithinolytica   Organism: Enterococcus faecalis   COVID-19 PCR . (09.16.21 @ 22:24)   COVID-19 PCR: NotDetec:         Patient is seen and examined at the bed side, is afebrile.  The creatinine is trending down slowly. He is scheduled for IR guided pigtail placement for AM.      REVIEW OF SYSTEMS: All other review systems are negative      ALLERGIES: No Known Allergies      ICU Vital Signs Last 24 Hrs  T(C): 36.3 (06 Oct 2021 16:58), Max: 36.7 (06 Oct 2021 12:00)  T(F): 97.3 (06 Oct 2021 16:58), Max: 98.1 (06 Oct 2021 12:00)  HR: 83 (06 Oct 2021 17:00) (66 - 83)  BP: 105/53 (06 Oct 2021 17:00) (95/74 - 124/54)  BP(mean): 64 (06 Oct 2021 17:00) (62 - 80)  ABP: --  ABP(mean): --  RR: 22 (06 Oct 2021 17:00) (13 - 32)  SpO2: 93% (06 Oct 2021 17:00) (77% - 100%)      PHYSICAL EXAM:  GENERAL: Not in distress, on  oxygen via NC  CHEST/LUNG:  Not using accessory muscles  HEART: s1 and s2 present  ABDOMEN:  Mild distended   EXTREMITIES: Right foot bandage in placed   CNS: Awake, and  Alert        LABS:                        8.9    7.24  )-----------( 280      ( 06 Oct 2021 06:36 )             27.2                           10.0   7.73  )-----------( 332      ( 05 Oct 2021 06:43 )             30.3       10-06    144  |  104  |  27<H>  ----------------------------<  96  3.3<L>   |  24  |  2.02<H>    Ca    8.5      06 Oct 2021 06:36  Phos  3.8     10-06  Mg     2.3     10-06    TPro  6.9  /  Alb  2.5<L>  /  TBili  0.6  /  DBili  x   /  AST  26  /  ALT  16  /  AlkPhos  97  10-06    10-05    143  |  105  |  27<H>  ----------------------------<  111<H>  3.8   |  24  |  2.17<H>    Ca    8.8      05 Oct 2021 06:43  Phos  4.0     10-05  Mg     2.4     10-05    TPro  7.2  /  Alb  2.5<L>  /  TBili  0.5  /  DBili  x   /  AST  28  /  ALT  18  /  AlkPhos  108  10-0      09-25    139  |  107  |  41<H>  ----------------------------<  134<H>  4.1   |  21<L>  |  4.05<H>    Ca    8.6      25 Sep 2021 06:40    TPro  6.6  /  Alb  2.3<L>  /  TBili  0.5  /  DBili  x   /  AST  25  /  ALT  15  /  AlkPhos  102  09-25        CAPILLARY BLOOD GLUCOSE  POCT Blood Glucose.: 134 mg/dL (17 Sep 2021 17:11)  POCT Blood Glucose.: 128 mg/dL (17 Sep 2021 11:06)  POCT Blood Glucose.: 157 mg/dL (17 Sep 2021 04:10)      MEDICATIONS  (STANDING):    albumin human 25% IVPB 50 milliLiter(s) IV Intermittent every 6 hours  ampicillin/sulbactam  IVPB 3 Gram(s) IV Intermittent every 6 hours  atorvastatin 80 milliGRAM(s) Oral at bedtime  chlorhexidine 2% Cloths 1 Application(s) Topical <User Schedule>  cyanocobalamin 1000 MICROGram(s) Oral daily  ergocalciferol 70194 Unit(s) Oral <User Schedule>  ferrous    sulfate 325 milliGRAM(s) Oral daily  finasteride 5 milliGRAM(s) Oral daily  fluconAZOLE IVPB      fluconAZOLE IVPB 100 milliGRAM(s) IV Intermittent every 24 hours  furosemide   Injectable 40 milliGRAM(s) IV Push every 6 hours  insulin lispro (ADMELOG) corrective regimen sliding scale   SubCutaneous Before meals and at bedtime  levoFLOXacin  Tablet 250 milliGRAM(s) Oral every 24 hours  metoprolol succinate  milliGRAM(s) Oral daily  NIFEdipine XL 60 milliGRAM(s) Oral daily  pantoprazole    Tablet 40 milliGRAM(s) Oral before breakfast  phytonadione  IVPB 10 milliGRAM(s) IV Intermittent once  polyethylene glycol 3350 17 Gram(s) Oral daily  senna 2 Tablet(s) Oral at bedtime  tamsulosin 0.4 milliGRAM(s) Oral at bedtime        RADIOLOGY & ADDITIONAL TESTS:    10/5/21 CT Chest No Cont (10.05.21 @ 14:07) Pulmonary edema with bilateral moderate to large pleural effusions. Underlying bilateral lower lobe compressive atelectasis versus pneumonia.  Mildly enlarged mediastinal lymph nodes, possibly reactive.      10/2/21: Xray Chest 1 View- PORTABLE-Routine (Xray Chest 1 View- PORTABLE-Routine in AM.) (10.02.21 @ 09:46) There is persistent bilateral perihilar/basilar diffuse airspace disease and/or RIGHT effusion.  Cardiomegaly.  No interval change.    Collected Date/Time: 9/22/2021 08:42 EDT   Received Date/Time: 9/23/2021 09:29 EDT   Surgical Pathology Report - Auth (Verified)   Specimen(s) Submitted   1 Right 5th Toe Wound Debridement r/o Osteomyelitis   Final Diagnosis   Right fifth toe; wound debridement:   - Bone with acute osteomyelitis and necrotic periosteal tissue.     9/28/21: US Abdomen Upper Quadrant Right (09.28.21 @ 12:13) Cholelithiasis without evidence of acute cholecystitis. Note is made of right pleural effusion    9/27/21: CT Abdomen and Pelvis w/ Oral Cont (09.27.21 @ 15:53) >No acute intra-abdominal pathology.    Small bilateral pleural effusions with compressive atelectasis of both lower lobes.    9/26/21: Xray Chest 1 View-PORTABLE IMMEDIATE (Xray Chest 1 View-PORTABLE IMMEDIATE .) (09.26.21 @ 15:28) : Small bilateral pleural effusions and mild pulmonary edema. A right lower lobe pneumonia is not excluded.      9/26/21: Xray Abdomen 1 View Portable, IMMEDIATE (Xray Abdomen 1 View Portable, IMMEDIATE .) (09.26.21 @ 15:28) : There is a nasogastric tube with its tip in the stomach. There is gaseous distention of the colon. No pathologic calcifications are seen. The osseous structures are intact with degenerative change is present in the spine.      9/17/21 : MR Foot No Cont, Right (09.17.21 @ 13:04) : Resection at the mid aspect of the fifth metatarsal. Lateral soft tissue wound with marrow signal abnormality throughout the fifth metatarsal and within the adjacent fourth metatarsal head which is nonspecific in the setting of recent surgery although osteomyelitis is suspected.    9/16/21 : Xray Foot AP + Lateral + Oblique, Right (09.16.21 @ 15:03) : No acute finding. No plain film evidence of osteomyelitis.        MICROBIOLOGY DATA:    Urine Microscopic-Add On (NC) (09.22.21 @ 16:14)   Red Blood Cell - Urine: 0-2 /HPF   White Blood Cell - Urine: 3-5 /HPF   Bacteria: Moderate /HPF   Comment - Urine: yeast cells present   Epithelial Cells: Moderate /HPF     Culture - Tissue with Gram Stain (09.22.21 @ 13:54)   Gram Stain: No polymorphonuclear cells seen per low power field   No organisms seen per oil power field   Specimen Source: .Tissue Right 5th toe r/o osteomyeltis   Culture Results: No growth     COVID-19 David Domain Antibody (09.18.21 @ 12:55)   COVID-19 David Domain Antibody Result: >250.00:    Culture - Blood (09.17.21 @ 10:28)   Specimen Source: .Blood Blood-Peripheral   Culture Results: No growth to date.     Culture - Blood (09.17.21 @ 10:28)   Specimen Source: .Blood Blood-Venous   Culture Results: No growth to date.     Culture - Surgical Swab (09.16.21 @ 21:42)   - Amikacin: S <=16   - Amikacin: S <=16   - Amoxicillin/Clavulanic Acid: S <=8/4   - Ampicillin: R >16 These ampicillin results predict results for amoxicillin   - Ampicillin: S <=2 Predicts results to ampicillin/sulbactam, amoxacillin-clavulanate and piperacillin-tazobactam.   - Ampicillin/Sulbactam: S 8/4 Enterobacter, Citrobacter, and Serratia may develop resistance during prolonged therapy (3-4 days)   - Ampicillin/Sulbactam: R >16/8   - Aztreonam: S <=4   - Aztreonam: S <=4   - Cefazolin: R 16 Enterobacter, Citrobacter, and Serratia may develop resistance during prolonged therapy (3-4 days)   - Cefazolin: R >16   - Cefepime: S <=2   - Cefepime: S <=2   - Cefoxitin: S <=8   - Cefoxitin: S <=8   - Ceftazidime: S <=1   - Ceftriaxone: S <=1   - Ceftriaxone: S <=1 Enterobacter, Citrobacter, and Serratia may develop resistance during prolonged therapy   - Ciprofloxacin: S <=0.25   - Ciprofloxacin: S <=0.25   - Ertapenem: S <=0.5   - Gentamicin: S <=2   - Gentamicin: S <=2   - Imipenem: S <=1   - Levofloxacin: S <=0.5   - Levofloxacin: S <=0.5   - Meropenem: S <=1   - Meropenem: S <=1   - Piperacillin/Tazobactam: S <=8   - Piperacillin/Tazobactam: S <=8   - Tetra/Doxy: S 4   - Tobramycin: S <=2   - Trimethoprim/Sulfamethoxazole: S <=0.5/9.5   - Trimethoprim/Sulfamethoxazole: S <=0.5/9.5   - Vancomycin: S 2   Specimen Source: .Surgical Swab right foot wound   Culture Results:   Culture yields >4 types of aerobic and/or anaerobic bacteria   Call client services within 7 days if further workup is clinically   indicated. Culture includes   Rare Aeromonas hydrophila/caviae   Few Klebsiella oxytoca/Raoutella ornithinolytica   Few Enterococcus faecalis   Few Streptococcus agalactiae (Group B) isolated   Group B streptococci are susceptible to ampicillin,   penicillin and cefazolin, but may be resistant to   erythromycin and clindamycin.   Recommendations for intrapartum prophylaxis for Group B   streptococci are penicillin or ampicillin.   Organism Identification: Aeromonas hydrophila/caviae   Klebsiella oxytoca /Raoutella ornithinolytica   Enterococcus faecalis   Organism: Aeromonas hydrophila/caviae   Organism: Klebsiella oxytoca /Raoutella ornithinolytica   Organism: Enterococcus faecalis   COVID-19 PCR . (09.16.21 @ 22:24)   COVID-19 PCR: NotDetec:

## 2021-10-06 NOTE — SWALLOW VFSS/MBS ASSESSMENT ADULT - DIAGNOSTIC IMPRESSIONS
Pt p/w oropharyngeal dysphagia, impaired bolus formation, prolonged mastication, slow A-P transport, oral stasis, premature spillage of bolus to the valleculae (nectar, thin by spoon) & pyriform sinus (mech soft, thin by cup). Base-of-tongue pumping & reduced base of tongue retraction noted. Poor BOT contact w posterior pharyngeal wall, & weak hyoid excursion. Retention in the Vallecular stasis across all trials, reduced with at least 2+ repeat swallows, with 10-20% remaining. Greater pharyngeal clearance w/ thin liquid wash, w/ less premature spillage on small presentation size (Tspn). Shoulder occludes view of trachea below vocal folds. While no laryngeal penetration was seen at this exam, contraints of radiographic exam could not exclude possible post-imaging penetration of aspiration from remaining pharyngeal stasis. Reduced likelihood/risk of aspiration from pharyngeal material if Pt is kept upright between 30-60 mins after PO intake.

## 2021-10-06 NOTE — CONSULT NOTE ADULT - ATTENDING COMMENTS
I reviewed the above and edited where appropriate.  Chart and relevant imaging reviewed.  Briefly, 76M with pulmonary edema and bilateral pleural effusions (R>L) on CT from yesterday. No CXR today. Patient is s/p unsuccessful chest tube placement at bedside. Right chest tube placement was requested.  -It is possible that edema/effusions responded, at least partially, to medical management/optimization. Recommend obtaining CXR in AM. If persistent/worsening effusion, will plan to place right chest tube.  -HOLD aspirin and AM anticoagulation.  -Early AM labs (CBC, BMP, PT/INR, PTT)  -NPO past midnight    Call IR if clinical situation changes and/or new information becomes available.    Case was discussed with Dr. Xavier.

## 2021-10-07 LAB
ALBUMIN SERPL ELPH-MCNC: 3.2 G/DL — LOW (ref 3.5–5)
ALP SERPL-CCNC: 89 U/L — SIGNIFICANT CHANGE UP (ref 40–120)
ALT FLD-CCNC: 17 U/L DA — SIGNIFICANT CHANGE UP (ref 10–60)
ANION GAP SERPL CALC-SCNC: 12 MMOL/L — SIGNIFICANT CHANGE UP (ref 5–17)
AST SERPL-CCNC: 26 U/L — SIGNIFICANT CHANGE UP (ref 10–40)
BASE EXCESS BLDA CALC-SCNC: -2.5 MMOL/L — LOW (ref -2–3)
BASE EXCESS BLDV CALC-SCNC: -1.5 MMOL/L — SIGNIFICANT CHANGE UP
BASOPHILS # BLD AUTO: 0.03 K/UL — SIGNIFICANT CHANGE UP (ref 0–0.2)
BASOPHILS NFR BLD AUTO: 0.5 % — SIGNIFICANT CHANGE UP (ref 0–2)
BILIRUB SERPL-MCNC: 0.6 MG/DL — SIGNIFICANT CHANGE UP (ref 0.2–1.2)
BLD GP AB SCN SERPL QL: SIGNIFICANT CHANGE UP
BLOOD GAS COMMENTS ARTERIAL: SIGNIFICANT CHANGE UP
BUN SERPL-MCNC: 28 MG/DL — HIGH (ref 7–18)
CALCIUM SERPL-MCNC: 8.8 MG/DL — SIGNIFICANT CHANGE UP (ref 8.4–10.5)
CHLORIDE SERPL-SCNC: 104 MMOL/L — SIGNIFICANT CHANGE UP (ref 96–108)
CO2 SERPL-SCNC: 27 MMOL/L — SIGNIFICANT CHANGE UP (ref 22–31)
CREAT SERPL-MCNC: 2.12 MG/DL — HIGH (ref 0.5–1.3)
EOSINOPHIL # BLD AUTO: 0.07 K/UL — SIGNIFICANT CHANGE UP (ref 0–0.5)
EOSINOPHIL NFR BLD AUTO: 1.1 % — SIGNIFICANT CHANGE UP (ref 0–6)
GLUCOSE BLDC GLUCOMTR-MCNC: 172 MG/DL — HIGH (ref 70–99)
GLUCOSE SERPL-MCNC: 98 MG/DL — SIGNIFICANT CHANGE UP (ref 70–99)
HCO3 BLDA-SCNC: 22 MMOL/L — SIGNIFICANT CHANGE UP (ref 21–28)
HCO3 BLDV-SCNC: 24 MMOL/L — SIGNIFICANT CHANGE UP (ref 22–29)
HCT VFR BLD CALC: 25.7 % — LOW (ref 39–50)
HGB BLD-MCNC: 8.2 G/DL — LOW (ref 13–17)
HOROWITZ INDEX BLDA+IHG-RTO: 100 — SIGNIFICANT CHANGE UP
IMM GRANULOCYTES NFR BLD AUTO: 0.5 % — SIGNIFICANT CHANGE UP (ref 0–1.5)
INR BLD: 1.42 RATIO — HIGH (ref 0.88–1.16)
LYMPHOCYTES # BLD AUTO: 0.82 K/UL — LOW (ref 1–3.3)
LYMPHOCYTES # BLD AUTO: 12.3 % — LOW (ref 13–44)
MAGNESIUM SERPL-MCNC: 2.3 MG/DL — SIGNIFICANT CHANGE UP (ref 1.6–2.6)
MCHC RBC-ENTMCNC: 29.4 PG — SIGNIFICANT CHANGE UP (ref 27–34)
MCHC RBC-ENTMCNC: 31.9 GM/DL — LOW (ref 32–36)
MCV RBC AUTO: 92.1 FL — SIGNIFICANT CHANGE UP (ref 80–100)
MONOCYTES # BLD AUTO: 0.44 K/UL — SIGNIFICANT CHANGE UP (ref 0–0.9)
MONOCYTES NFR BLD AUTO: 6.6 % — SIGNIFICANT CHANGE UP (ref 2–14)
NEUTROPHILS # BLD AUTO: 5.25 K/UL — SIGNIFICANT CHANGE UP (ref 1.8–7.4)
NEUTROPHILS NFR BLD AUTO: 79 % — HIGH (ref 43–77)
NRBC # BLD: 0 /100 WBCS — SIGNIFICANT CHANGE UP (ref 0–0)
PCO2 BLDA: 39 MMHG — SIGNIFICANT CHANGE UP (ref 35–48)
PCO2 BLDV: 41 MMHG — LOW (ref 42–55)
PH BLDA: 7.37 — SIGNIFICANT CHANGE UP (ref 7.35–7.45)
PH BLDV: 7.37 — SIGNIFICANT CHANGE UP (ref 7.32–7.43)
PHOSPHATE SERPL-MCNC: 4.3 MG/DL — SIGNIFICANT CHANGE UP (ref 2.5–4.5)
PLATELET # BLD AUTO: 231 K/UL — SIGNIFICANT CHANGE UP (ref 150–400)
PO2 BLDA: 91 MMHG — SIGNIFICANT CHANGE UP (ref 83–108)
PO2 BLDV: 26 MMHG — SIGNIFICANT CHANGE UP
POTASSIUM SERPL-MCNC: 3.6 MMOL/L — SIGNIFICANT CHANGE UP (ref 3.5–5.3)
POTASSIUM SERPL-SCNC: 3.6 MMOL/L — SIGNIFICANT CHANGE UP (ref 3.5–5.3)
PROT SERPL-MCNC: 6.9 G/DL — SIGNIFICANT CHANGE UP (ref 6–8.3)
PROTHROM AB SERPL-ACNC: 16.6 SEC — HIGH (ref 10.6–13.6)
RBC # BLD: 2.79 M/UL — LOW (ref 4.2–5.8)
RBC # FLD: 14.6 % — HIGH (ref 10.3–14.5)
SAO2 % BLDA: 98 % — SIGNIFICANT CHANGE UP
SAO2 % BLDV: 30.6 % — SIGNIFICANT CHANGE UP
SODIUM SERPL-SCNC: 143 MMOL/L — SIGNIFICANT CHANGE UP (ref 135–145)
WBC # BLD: 6.64 K/UL — SIGNIFICANT CHANGE UP (ref 3.8–10.5)
WBC # FLD AUTO: 6.64 K/UL — SIGNIFICANT CHANGE UP (ref 3.8–10.5)

## 2021-10-07 PROCEDURE — 71045 X-RAY EXAM CHEST 1 VIEW: CPT | Mod: 26,76

## 2021-10-07 PROCEDURE — 93306 TTE W/DOPPLER COMPLETE: CPT | Mod: 26

## 2021-10-07 PROCEDURE — 71045 X-RAY EXAM CHEST 1 VIEW: CPT | Mod: 26,77

## 2021-10-07 RX ORDER — ASPIRIN/CALCIUM CARB/MAGNESIUM 324 MG
81 TABLET ORAL DAILY
Refills: 0 | Status: DISCONTINUED | OUTPATIENT
Start: 2021-10-07 | End: 2021-10-08

## 2021-10-07 RX ORDER — PHENYLEPHRINE HYDROCHLORIDE 10 MG/ML
5 INJECTION INTRAVENOUS
Qty: 40 | Refills: 0 | Status: DISCONTINUED | OUTPATIENT
Start: 2021-10-07 | End: 2021-10-07

## 2021-10-07 RX ORDER — PHENYLEPHRINE HYDROCHLORIDE 10 MG/ML
1 INJECTION INTRAVENOUS
Qty: 40 | Refills: 0 | Status: DISCONTINUED | OUTPATIENT
Start: 2021-10-07 | End: 2021-10-08

## 2021-10-07 RX ORDER — HYDROCORTISONE 20 MG
50 TABLET ORAL ONCE
Refills: 0 | Status: COMPLETED | OUTPATIENT
Start: 2021-10-07 | End: 2021-10-07

## 2021-10-07 RX ORDER — HEPARIN SODIUM 5000 [USP'U]/ML
5000 INJECTION INTRAVENOUS; SUBCUTANEOUS EVERY 8 HOURS
Refills: 0 | Status: DISCONTINUED | OUTPATIENT
Start: 2021-10-07 | End: 2021-10-08

## 2021-10-07 RX ORDER — MIDAZOLAM HYDROCHLORIDE 1 MG/ML
1 INJECTION, SOLUTION INTRAMUSCULAR; INTRAVENOUS ONCE
Refills: 0 | Status: DISCONTINUED | OUTPATIENT
Start: 2021-10-07 | End: 2021-10-07

## 2021-10-07 RX ORDER — PROPOFOL 10 MG/ML
30 INJECTION, EMULSION INTRAVENOUS
Qty: 1000 | Refills: 0 | Status: DISCONTINUED | OUTPATIENT
Start: 2021-10-07 | End: 2021-10-12

## 2021-10-07 RX ORDER — ETOMIDATE 2 MG/ML
20 INJECTION INTRAVENOUS ONCE
Refills: 0 | Status: COMPLETED | OUTPATIENT
Start: 2021-10-07 | End: 2021-10-07

## 2021-10-07 RX ORDER — PROPOFOL 10 MG/ML
50 INJECTION, EMULSION INTRAVENOUS ONCE
Refills: 0 | Status: COMPLETED | OUTPATIENT
Start: 2021-10-07 | End: 2021-10-07

## 2021-10-07 RX ORDER — MORPHINE SULFATE 50 MG/1
4 CAPSULE, EXTENDED RELEASE ORAL ONCE
Refills: 0 | Status: DISCONTINUED | OUTPATIENT
Start: 2021-10-07 | End: 2021-10-07

## 2021-10-07 RX ORDER — PANTOPRAZOLE SODIUM 20 MG/1
40 TABLET, DELAYED RELEASE ORAL AT BEDTIME
Refills: 0 | Status: DISCONTINUED | OUTPATIENT
Start: 2021-10-07 | End: 2021-10-24

## 2021-10-07 RX ORDER — PROPOFOL 10 MG/ML
10 INJECTION, EMULSION INTRAVENOUS
Qty: 1000 | Refills: 0 | Status: DISCONTINUED | OUTPATIENT
Start: 2021-10-07 | End: 2021-10-07

## 2021-10-07 RX ORDER — CHLORHEXIDINE GLUCONATE 213 G/1000ML
15 SOLUTION TOPICAL EVERY 12 HOURS
Refills: 0 | Status: DISCONTINUED | OUTPATIENT
Start: 2021-10-07 | End: 2021-10-13

## 2021-10-07 RX ORDER — MIDODRINE HYDROCHLORIDE 2.5 MG/1
5 TABLET ORAL ONCE
Refills: 0 | Status: COMPLETED | OUTPATIENT
Start: 2021-10-07 | End: 2021-10-07

## 2021-10-07 RX ADMIN — AMPICILLIN SODIUM AND SULBACTAM SODIUM 200 GRAM(S): 250; 125 INJECTION, POWDER, FOR SUSPENSION INTRAMUSCULAR; INTRAVENOUS at 17:35

## 2021-10-07 RX ADMIN — AMPICILLIN SODIUM AND SULBACTAM SODIUM 200 GRAM(S): 250; 125 INJECTION, POWDER, FOR SUSPENSION INTRAMUSCULAR; INTRAVENOUS at 11:23

## 2021-10-07 RX ADMIN — AMPICILLIN SODIUM AND SULBACTAM SODIUM 200 GRAM(S): 250; 125 INJECTION, POWDER, FOR SUSPENSION INTRAMUSCULAR; INTRAVENOUS at 06:00

## 2021-10-07 RX ADMIN — PROPOFOL 5.21 MICROGRAM(S)/KG/MIN: 10 INJECTION, EMULSION INTRAVENOUS at 16:36

## 2021-10-07 RX ADMIN — Medication 40 MILLIGRAM(S): at 05:56

## 2021-10-07 RX ADMIN — Medication 60 MILLIGRAM(S): at 05:15

## 2021-10-07 RX ADMIN — TAMSULOSIN HYDROCHLORIDE 0.4 MILLIGRAM(S): 0.4 CAPSULE ORAL at 21:09

## 2021-10-07 RX ADMIN — Medication 1: at 11:22

## 2021-10-07 RX ADMIN — Medication 50 MILLILITER(S): at 05:01

## 2021-10-07 RX ADMIN — AMPICILLIN SODIUM AND SULBACTAM SODIUM 200 GRAM(S): 250; 125 INJECTION, POWDER, FOR SUSPENSION INTRAMUSCULAR; INTRAVENOUS at 23:09

## 2021-10-07 RX ADMIN — MIDODRINE HYDROCHLORIDE 5 MILLIGRAM(S): 2.5 TABLET ORAL at 10:38

## 2021-10-07 RX ADMIN — SENNA PLUS 2 TABLET(S): 8.6 TABLET ORAL at 21:09

## 2021-10-07 RX ADMIN — MIDAZOLAM HYDROCHLORIDE 1 MILLIGRAM(S): 1 INJECTION, SOLUTION INTRAMUSCULAR; INTRAVENOUS at 13:24

## 2021-10-07 RX ADMIN — PROPOFOL 50 MILLIGRAM(S): 10 INJECTION, EMULSION INTRAVENOUS at 16:38

## 2021-10-07 RX ADMIN — ETOMIDATE 20 MILLIGRAM(S): 2 INJECTION INTRAVENOUS at 16:38

## 2021-10-07 RX ADMIN — PANTOPRAZOLE SODIUM 40 MILLIGRAM(S): 20 TABLET, DELAYED RELEASE ORAL at 05:15

## 2021-10-07 RX ADMIN — Medication 50 MILLIGRAM(S): at 12:14

## 2021-10-07 RX ADMIN — PANTOPRAZOLE SODIUM 40 MILLIGRAM(S): 20 TABLET, DELAYED RELEASE ORAL at 21:09

## 2021-10-07 RX ADMIN — MORPHINE SULFATE 4 MILLIGRAM(S): 50 CAPSULE, EXTENDED RELEASE ORAL at 17:08

## 2021-10-07 RX ADMIN — CHLORHEXIDINE GLUCONATE 15 MILLILITER(S): 213 SOLUTION TOPICAL at 19:16

## 2021-10-07 RX ADMIN — FLUCONAZOLE 100 MILLIGRAM(S): 150 TABLET ORAL at 02:34

## 2021-10-07 RX ADMIN — Medication 100 MILLIGRAM(S): at 05:15

## 2021-10-07 RX ADMIN — CHLORHEXIDINE GLUCONATE 1 APPLICATION(S): 213 SOLUTION TOPICAL at 05:03

## 2021-10-07 RX ADMIN — ATORVASTATIN CALCIUM 80 MILLIGRAM(S): 80 TABLET, FILM COATED ORAL at 21:09

## 2021-10-07 RX ADMIN — HEPARIN SODIUM 5000 UNIT(S): 5000 INJECTION INTRAVENOUS; SUBCUTANEOUS at 21:10

## 2021-10-07 RX ADMIN — Medication 40 MILLIGRAM(S): at 12:08

## 2021-10-07 RX ADMIN — Medication 50 MILLILITER(S): at 11:23

## 2021-10-07 RX ADMIN — PHENYLEPHRINE HYDROCHLORIDE 163 MICROGRAM(S)/KG/MIN: 10 INJECTION INTRAVENOUS at 12:52

## 2021-10-07 RX ADMIN — MORPHINE SULFATE 4 MILLIGRAM(S): 50 CAPSULE, EXTENDED RELEASE ORAL at 16:38

## 2021-10-07 RX ADMIN — MIDAZOLAM HYDROCHLORIDE 1 MILLIGRAM(S): 1 INJECTION, SOLUTION INTRAMUSCULAR; INTRAVENOUS at 15:42

## 2021-10-07 NOTE — PROGRESS NOTE ADULT - SUBJECTIVE AND OBJECTIVE BOX
C A R D I O L O G Y  **********************************    DATE OF SERVICE: 10-07-21    Resting appears comfortable     ampicillin/sulbactam  IVPB 3 Gram(s) IV Intermittent every 6 hours  atorvastatin 80 milliGRAM(s) Oral at bedtime  chlorhexidine 2% Cloths 1 Application(s) Topical <User Schedule>  cyanocobalamin 1000 MICROGram(s) Oral daily  ergocalciferol 40391 Unit(s) Oral <User Schedule>  ferrous    sulfate 325 milliGRAM(s) Oral daily  finasteride 5 milliGRAM(s) Oral daily  fluconAZOLE IVPB      fluconAZOLE IVPB 100 milliGRAM(s) IV Intermittent every 24 hours  insulin lispro (ADMELOG) corrective regimen sliding scale   SubCutaneous Before meals and at bedtime  levoFLOXacin  Tablet 250 milliGRAM(s) Oral every 24 hours  metoprolol succinate  milliGRAM(s) Oral daily  ondansetron Injectable 4 milliGRAM(s) IV Push three times a day PRN  pantoprazole    Tablet 40 milliGRAM(s) Oral before breakfast  phenylephrine    Infusion 5 MICROgram(s)/kG/Min IV Continuous <Continuous>  polyethylene glycol 3350 17 Gram(s) Oral daily  senna 2 Tablet(s) Oral at bedtime  sodium chloride 0.9% lock flush 10 milliLiter(s) IV Push every 1 hour PRN  tamsulosin 0.4 milliGRAM(s) Oral at bedtime                            8.2    6.64  )-----------( 231      ( 07 Oct 2021 04:07 )             25.7       Hemoglobin: 8.2 g/dL (10-07 @ 04:07)  Hemoglobin: 8.9 g/dL (10-06 @ 06:36)  Hemoglobin: 8.8 g/dL (10-05 @ 22:32)  Hemoglobin: 10.0 g/dL (10-05 @ 06:43)  Hemoglobin: 8.9 g/dL (10-04 @ 06:10)      10-07    143  |  104  |  28<H>  ----------------------------<  98  3.6   |  27  |  2.12<H>    Ca    8.8      07 Oct 2021 04:07  Phos  4.3     10-07  Mg     2.3     10-07    TPro  6.9  /  Alb  3.2<L>  /  TBili  0.6  /  DBili  x   /  AST  26  /  ALT  17  /  AlkPhos  89  10-07    Creatinine Trend: 2.12<--, 2.02<--, 2.11<--, 2.17<--, 2.13<--, 1.70<--    COAGS: PT/INR - ( 07 Oct 2021 04:07 )   PT: 16.6 sec;   INR: 1.42 ratio          T(C): 35.3 (10-07-21 @ 08:00), Max: 36.5 (10-06-21 @ 23:36)  HR: 57 (10-07-21 @ 11:30) (54 - 90)  BP: 88/41 (10-07-21 @ 11:30) (87/36 - 123/63)  RR: 23 (10-07-21 @ 11:30) (19 - 30)  SpO2: 95% (10-07-21 @ 11:30) (77% - 100%)  Wt(kg): --    I&O's Summary    06 Oct 2021 07:01  -  07 Oct 2021 07:00  --------------------------------------------------------  IN: 650 mL / OUT: 991 mL / NET: -341 mL    07 Oct 2021 07:01  -  07 Oct 2021 12:50  --------------------------------------------------------  IN: 0 mL / OUT: 45 mL / NET: -45 mL      HEENT:  (-)icterus (-)pallor  CV: N S1 S2 1/6 TRINIDAD (+)2 Pulses B/l  Resp:  Coaurse sounds B/L, normal effort  GI: (+) BS Soft, NT, ND  Lymph:  (-)Edema, (-)obvious lymphadenopathy  Skin: Warm to touch, Normal turgor  Psych: Appropriate mood and affect      ASSESSMENT/PLAN: 	76y  Male PMH DM on insulin, HTN, HLD, normal lV fx moderate to severe AS 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    - monitor crt, nephrology f/u appreciated   - Podiatry f/u noted  - Abx per primary team  - Positive trop noted, likely due to increased demand and renal failure.  - He will need a SABIHA and R+L heart cath once he is medically optimized and infection treated  - Cont medical management of CAD  -  Thoracic f/u for possible pig tail placement     Zak Wilkins MD, [facility]  BEEPER [phone]

## 2021-10-07 NOTE — CHART NOTE - NSCHARTNOTEFT_GEN_A_CORE
Assessment:   Patient is a 76y old  Male who presents with a chief complaint of R Foot Pain (07 Oct 2021 12:49). Pt transferred to ICU 10/5. NPO (BIPAP). Acute hypoxic respiratory failure. OREN on CKD (Nephrology following).        Diet Prescription: Diet, NPO after Midnight:      NPO Start Date: 06-Oct-2021,   NPO Start Time: 23:59 (10-06-21 @ 18:26)  Diet, NPO after Midnight:      NPO Start Date: 06-Oct-2021,   NPO Start Time: 23:59 (10-06-21 @ 16:39)        Daily     Daily Weight in k.5 (07 Oct 2021 07:00)  Weight in k.1 (06 Oct 2021 07:00)  Weight in k.2 (30 Sep 2021 04:54)  Weight in k.3 (28 Sep 2021 04:57)        Pertinent Medications: MEDICATIONS  (STANDING):  ampicillin/sulbactam  IVPB 3 Gram(s) IV Intermittent every 6 hours  atorvastatin 80 milliGRAM(s) Oral at bedtime  chlorhexidine 2% Cloths 1 Application(s) Topical <User Schedule>  cyanocobalamin 1000 MICROGram(s) Oral daily  ergocalciferol 65654 Unit(s) Oral <User Schedule>  ferrous    sulfate 325 milliGRAM(s) Oral daily  finasteride 5 milliGRAM(s) Oral daily  fluconAZOLE IVPB      fluconAZOLE IVPB 100 milliGRAM(s) IV Intermittent every 24 hours  insulin lispro (ADMELOG) corrective regimen sliding scale   SubCutaneous Before meals and at bedtime  levoFLOXacin  Tablet 250 milliGRAM(s) Oral every 24 hours  metoprolol succinate  milliGRAM(s) Oral daily  pantoprazole    Tablet 40 milliGRAM(s) Oral before breakfast  phenylephrine    Infusion 5 MICROgram(s)/kG/Min (163 mL/Hr) IV Continuous <Continuous>  polyethylene glycol 3350 17 Gram(s) Oral daily  senna 2 Tablet(s) Oral at bedtime  tamsulosin 0.4 milliGRAM(s) Oral at bedtime    MEDICATIONS  (PRN):  ondansetron Injectable 4 milliGRAM(s) IV Push three times a day PRN Nausea and/or Vomiting  sodium chloride 0.9% lock flush 10 milliLiter(s) IV Push every 1 hour PRN Pre/post blood products, medications, blood draw, and to maintain line patency    Pertinent Labs: 10-07 Na143 mmol/L Glu 98 mg/dL K+ 3.6 mmol/L Cr  2.12 mg/dL<H> BUN 28 mg/dL<H> 10-07 Phos 4.3 mg/dL 10-07 Alb 3.2 g/dL<L>     CAPILLARY BLOOD GLUCOSE      POCT Blood Glucose.: 172 mg/dL (07 Oct 2021 11:12)  POCT Blood Glucose.: 132 mg/dL (06 Oct 2021 22:35)  POCT Blood Glucose.: 129 mg/dL (06 Oct 2021 16:23)        Estimated Needs:   [x ] no change since previous assessment  [ ] recalculated:       Previous Nutrition Diagnosis:   [ ] Altered GI function  [x ]Inadequate Oral Intake [ ] Swallowing Difficulty   [ ] Altered nutrition related labs [ ] Increased Nutrient Needs [ ] Overweight/Obesity   [ ] Unintended Weight Loss [ ] Food & Nutrition Related Knowledge Deficit [ ] Malnutrition   [ ] Other:     Nutrition Diagnosis is [x ] ongoing  [ ] resolved [ ] not applicable     New Nutrition Diagnosis: [ ] not applicable       Interventions:   Recommend  [ ] Change Diet To:  [ ] Nutrition Supplement  [ ] Nutrition Support  [x ] Other: Diet advancement per MD. MD to monitor. RD available.     Monitoring and Evaluation:   [ x ] follow up per protocol  [ ] other: Assessment:   Patient is a 76y old  Male who presents with a chief complaint of R Foot Pain (07 Oct 2021 12:49). Pt transferred to ICU 10/5. NPO (BIPAP). Acute hypoxic respiratory failure. OREN on CKD (Nephrology following). Pt s/p instrumental SLP 10/6 (rec for puree, thin liquids, spoon feed for all).        Diet Prescription: Diet, NPO after Midnight:      NPO Start Date: 06-Oct-2021,   NPO Start Time: 23:59 (10-06-21 @ 18:26)  Diet, NPO after Midnight:      NPO Start Date: 06-Oct-2021,   NPO Start Time: 23:59 (10-06-21 @ 16:39)        Daily     Daily Weight in k.5 (07 Oct 2021 07:00)  Weight in k.1 (06 Oct 2021 07:00)  Weight in k.2 (30 Sep 2021 04:54)  Weight in k.3 (28 Sep 2021 04:57)        Pertinent Medications: MEDICATIONS  (STANDING):  ampicillin/sulbactam  IVPB 3 Gram(s) IV Intermittent every 6 hours  atorvastatin 80 milliGRAM(s) Oral at bedtime  chlorhexidine 2% Cloths 1 Application(s) Topical <User Schedule>  cyanocobalamin 1000 MICROGram(s) Oral daily  ergocalciferol 88466 Unit(s) Oral <User Schedule>  ferrous    sulfate 325 milliGRAM(s) Oral daily  finasteride 5 milliGRAM(s) Oral daily  fluconAZOLE IVPB      fluconAZOLE IVPB 100 milliGRAM(s) IV Intermittent every 24 hours  insulin lispro (ADMELOG) corrective regimen sliding scale   SubCutaneous Before meals and at bedtime  levoFLOXacin  Tablet 250 milliGRAM(s) Oral every 24 hours  metoprolol succinate  milliGRAM(s) Oral daily  pantoprazole    Tablet 40 milliGRAM(s) Oral before breakfast  phenylephrine    Infusion 5 MICROgram(s)/kG/Min (163 mL/Hr) IV Continuous <Continuous>  polyethylene glycol 3350 17 Gram(s) Oral daily  senna 2 Tablet(s) Oral at bedtime  tamsulosin 0.4 milliGRAM(s) Oral at bedtime    MEDICATIONS  (PRN):  ondansetron Injectable 4 milliGRAM(s) IV Push three times a day PRN Nausea and/or Vomiting  sodium chloride 0.9% lock flush 10 milliLiter(s) IV Push every 1 hour PRN Pre/post blood products, medications, blood draw, and to maintain line patency    Pertinent Labs: 10-07 Na143 mmol/L Glu 98 mg/dL K+ 3.6 mmol/L Cr  2.12 mg/dL<H> BUN 28 mg/dL<H> 10-07 Phos 4.3 mg/dL 10-07 Alb 3.2 g/dL<L>     CAPILLARY BLOOD GLUCOSE      POCT Blood Glucose.: 172 mg/dL (07 Oct 2021 11:12)  POCT Blood Glucose.: 132 mg/dL (06 Oct 2021 22:35)  POCT Blood Glucose.: 129 mg/dL (06 Oct 2021 16:23)        Estimated Needs:   [x ] no change since previous assessment  [ ] recalculated:       Previous Nutrition Diagnosis:   [ ] Altered GI function  [x ]Inadequate Oral Intake [ ] Swallowing Difficulty   [ ] Altered nutrition related labs [ ] Increased Nutrient Needs [ ] Overweight/Obesity   [ ] Unintended Weight Loss [ ] Food & Nutrition Related Knowledge Deficit [ ] Malnutrition   [ ] Other:     Nutrition Diagnosis is [x ] ongoing  [ ] resolved [ ] not applicable     New Nutrition Diagnosis: [ ] not applicable       Interventions:   Recommend  [ ] Change Diet To:  [ ] Nutrition Supplement  [ ] Nutrition Support  [x ] Other: Diet advancement  (w/ PO supplement) per MD. MD to monitor. RD available.     Monitoring and Evaluation:   [ x ] follow up per protocol  [ ] other:

## 2021-10-07 NOTE — PROCEDURE NOTE - NSTRACHINTUBMED_RESP_A_CORE
Morphine/etomidate injectable/propofol injectable Morphine 4 mg/etomidate injectable/propofol injectable

## 2021-10-07 NOTE — PROGRESS NOTE ADULT - ASSESSMENT
Assessment and Recommendation:   · Assessment	  Assessment:  - Acute Hypoxic Respiratory Failure  - Sepsis 2/2 to Aspiration PNA vs R foot OM   - OREN superimposed on CKD  - Severe AS   - CAD s/p CABG   - Cholelithiasis   - HTN  - HLD    Plan:  Neuro:  - Patient currently AA0x3, moving all 4 extremities to command   - avoid any sedating meds  - Was on Mirtazapine; will hold   - Monitor for AMS while on BIPAP    CVS:  # Severe Aortic Stenosis   - Echo 8/15  confirmed EF 55-60%, Severe LVH, g1 diastolic dysfunction, and severe AS   -Murmurs on exam, systolic  - CT Chest with pulm edema bilaterally; moderate to large pleural effusions; atelectasis vs PNA   - BNP 19K with worsening overload as above; 9K today pt with poor cardiac reserve worsened by acute infection   - CXR  this admission showing improved R sided pleural effusion with diuresis   - Likely causing acute on chronic CHF exacerbation   - Avoid drugs which increase afterload   -F/u new Echo new EF  -40IV lasix Q6      #CAD   - S/p CABG, on ASA   - Continue Toprol 100 Qd, Procardia 60XL  - C/w Lasix 40 IV mg Q6h, Albumin 100mg   - Positive trop 1.46->1.550; likely due to increased demand and renal failure.  -Now down trended 1.05  - No active chest pain   - Echo As above, will follow new or POCUS  - Will need a SABIHA and R+L heart cath once medically optimized  - C/w ASA, statin  - Cardiology Dr. Franky Aceves:  - Acute Hypoxic/ Hypercapnic Resp Failure  - Patient on Pendant, saturating 90% on 10L pendant, but having increased WOB, tachypnea   - Possible left lower lobe pneumonia and bilateral pleural effusions on CT Chest   - ABG showing 7.49/33/52/25-> respiratory alkalosis with hypoxic   - Bipap 10/5 for hypoxia and work of breathing  - patient and family understands he may need intubation if he worsens (FULL CODE)  -Has not used Bipap overnight, sats normal on 8L   - CT Surgery consulted for possible thoracentesis vs Chest tube unable to find pocket  -IR consulted for CT guided pig tail.  - Send fluid for tests/ cultures     ID:  - Empiric Aspiration PNA coverage, R foot osteomyelitis, and Funguria   - Blood Cultures -ve till date, Bone and wound culture: showing Aeromonas, GBS, Klebsiella, Enterococcus   -Cultures from 9/22/2021, tissue and blood 9/17 negative  - UCX showing yeast - one source  - C/w Unasyn, Levaquin, Diflucan; renally dosed   -May DC diflucan as it is only one source    Nephro:  - Ibrahim placed for possible obstruction   - OREN on CKD stage III: ATN was resolving, however renal fxn now worsened.   - Kidney/ Bladder US with large distended bladder s/p ibrahim placement on 9/23, then removed.   - S/p bladder scan 10/4 with 1L urine for which Ibrahim was reinserted  - FeUrea: 20.5% 9/23: pre renal   -Cr 2.02 down trended  - Less likely due to obstructive uropathy.  No peripheral eosinophilia- no signs of AIN.   - Avoid nephrotoxins/ NSAIDs/ RCA. Monitor BMP.  - Nephrology Consulted Dr. Rodriguez     GI:  - NPO until off bipap  -RLL likely aspiration  - Had an Aspiration event after NG tube placed and subsequently removed by pt  - Aspiration precautions,  - Cholelithiasis noted on RUQ US   - Pt continues to have constipation/ c/w bowel regimen: monitor Stool Count   - Surgery consulted; no acute intervention  -CT tomorrow, may try saline enema, if ileus persists naloxagol  - f/u enhanced speech and swallow eval today    Heme:  - no indication for transfusion currently  - admitted with Hb 12.7 in August; now 8.8  - No active bleeding, normocytic   - Fe panel shows deficiency; will hold repletion during acute infection  - Likely component of ACD  - Tranfuse if Hb<7 or if worsening tachy/HF sx     Endo:  - target CBG < 180 controlled  - C/w Sliding Scale, accuchecks ACHS     Prophy:  - HSQ   - PPI Qd     Dispo:  - monitor in the ICU until off bipap  PT FULL CODE - confirmed w/ Daughter Mrs. Arabella Oliva        Assessment and Recommendation:   · Assessment	  Assessment:  - Acute Hypoxic Respiratory Failure  - Sepsis 2/2 to Aspiration PNA vs R foot OM   - OREN superimposed on CKD  - Severe AS   - CAD s/p CABG   - Cholelithiasis   - HTN  - HLD    Plan:  Neuro:  - Patient currently AA0x3, moving all 4 extremities to command   - avoid any sedating meds  - Was on Mirtazapine; will hold   - Monitor for AMS while on BIPAP    CVS:  # Severe Aortic Stenosis   - Echo 8/15  confirmed EF 55-60%, Severe LVH, g1 diastolic dysfunction, and severe AS   -Murmurs on exam, systolic  - CT Chest with pulm edema bilaterally; moderate to large pleural effusions; atelectasis vs PNA   - BNP 19K with worsening overload as above; 9K today pt with poor cardiac reserve worsened by acute infection   - CXR  this admission showing improved R sided pleural effusion with diuresis   - Likely causing acute on chronic CHF exacerbation   - Avoid drugs which increase afterload   -F/u new Echo new EF  -40IV lasix Q6      #CAD   - S/p CABG, on ASA   - Continue Toprol 100 Qd, Procardia 60XL  - C/w Lasix 40 IV mg Q6h, Albumin 100mg   - Positive trop 1.46->1.550; likely due to increased demand and renal failure.  -Now down trended 1.05  - No active chest pain   - Echo As above, will follow new echo or POCUS  - Will need a ASBIHA and R+L heart cath once medically optimized  - C/w ASA, statin  - Cardiology Dr. Wilkins     #hypotension  Overnight and this morning MAP 52  CXR looks worse, with pleural effusion and increased vascular marking  Midodrine 5mg stat  Albumin   Stress dose solucortef if not resolved  Hold all BP meds      Pulm:  - Acute Hypoxic/ Hypercapnic Resp Failure 2/2  1. RLL PNA vs  2. Right pleural effusions vs  3. CHF    - Patient on Pendant, saturating 90% on 10L pendant, but having increased WOB, tachypnea   - Possible left lower lobe pneumonia and bilateral pleural effusions on CT Chest   - ABG was showing 7.49/33/52/25-> respiratory alkalosis with hypoxic   -ICU  Bipap 10/5 for hypoxia and work of breathing,  -pt also had CABG hx and BNP 19k - 9K this am=dmission  - patient and family understands he may need intubation if he worsens (FULL CODE)  -Has not used Bipap overnight, sats normal on 6L   - CT Surgery consulted for possible thoracentesis vs Chest tube unable to find pocket  -IR consulted for CT guided pig tail- will drain today  - Send fluid for tests/ cultures   -c/w lasix 40IV daily    ID:  - Empiric Aspiration PNA coverage, R foot osteomyelitis, and Funguria   - Blood Cultures -ve till date, Bone and wound culture: showing Aeromonas, GBS, Klebsiella, Enterococcus   -Cultures from 9/22/2021, tissue and blood 9/17 negative  - UCX showing yeast - one source  - C/w Unasyn, Levaquin, Diflucan; renally dosed - all >12 days  -May DC diflucan as it is only one source    Nephro:  - Ibrahim placed for possible obstruction   - OREN on CKD stage III: ATN was resolving, however renal fxn now worsened.   - Kidney/ Bladder US with large distended bladder s/p ibrahim placement on 9/23, then removed.   - S/p bladder scan 10/4 with 1L urine for which Ibrahim was reinserted  - FeUrea: 20.5% 9/23: pre renal   -Cr 2.02 down trended then 2.12 still pre-renal likely  - Less likely due to obstructive uropathy.  No peripheral eosinophilia- no signs of AIN.   - Avoid nephrotoxins/ NSAIDs/ RCA. Monitor BMP.  - Nephrology Consulted Dr. Rodriguez     GI:  - NPO until off bipap  -RLL likely aspiration  - Had an Aspiration event after NG tube placed and subsequently removed by pt  - Aspiration precautions, speech and swallow recs, based on cineesophagogram  - Cholelithiasis noted on RUQ US   - Pt continues to have constipation/ c/w bowel regimen: monitor Stool Count   - Surgery consulted; no acute intervention  -Patient had a bowel movement, abdomen is soft and non tender  -cineesophagogram done, speech and swallow recommend puree with thin liquids,     Heme:  - no indication for transfusion currently  - admitted with Hb 12.7 in August; now 8.8  - No active bleeding, normocytic   - Fe panel shows deficiency; will hold repletion during acute infection  - Likely component of ACD, CKD  - Tranfuse if Hb<7 or if worsening tachy/HF sx     Endo:  - target CBG < 180 controlled  - C/w Sliding Scale, accuchecks ACHS     Prophy:  - HSQ   - PPI Qd     Dispo:  - monitor in the ICU until off bipap  PT FULL CODE - confirmed w/ Daughter Mrs. Bell Pj

## 2021-10-07 NOTE — PROGRESS NOTE ADULT - SUBJECTIVE AND OBJECTIVE BOX
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 s/p OR debridement today. Nephrology consulted for abrupt rise in SCr.   s/p ibrahim placement for distended bladder on 9/23 with ~400ml of urine o/p  9/27- pt with SOB; CXR with pulmonary edema- pt given Lasix 80mg IV x1. Concern for aspiration PNA  Course BP: s/p lasix 60mg IV on 10/1 & 10/2 and s/p Lasix 40mg IV x1 today 10/4. Bladder scan with 1L urine; s/p ibrahim reinserted  Transferred to ICU for acute hypoxic respiratory failure    Hospital Medications: Medications reviewed.  REVIEW OF SYSTEMS:  CONSTITUTIONAL: No fevers or chills  RESPIRATORY: no shortness of breath   CARDIOVASCULAR: No chest pain.  GASTROINTESTINAL: No nausea or vomiting, or diarrhea or abdominal pain    VASCULAR: No bilateral lower extremity edema. Rt foot - denies pain    VITALS:  T(F): 95.5 (10-07-21 @ 08:00), Max: 98.1 (10-06-21 @ 12:00)  HR: 57 (10-07-21 @ 11:30)  BP: 88/41 (10-07-21 @ 11:30)  RR: 23 (10-07-21 @ 11:30)  SpO2: 95% (10-07-21 @ 11:30)  Wt(kg): --    10-06 @ 07:01  -  10-07 @ 07:00  --------------------------------------------------------  IN: 650 mL / OUT: 991 mL / NET: -341 mL    10-07 @ 07:01  -  10-07 @ 11:50  --------------------------------------------------------  IN: 0 mL / OUT: 45 mL / NET: -45 mL      PHYSICAL EXAM:  Gen: NAD, calm  HEENT: MMM  Neck: no JVD  Cards: RRR, +S1/S2, +TRINIDAD  Resp:  coarse BS b/l  GI: soft, NT  : +ibrahim  Extremities: no LE edema B/L  Derm: Rt foot wrapped    LABS:  10-07    143  |  104  |  28<H>  ----------------------------<  98  3.6   |  27  |  2.12<H>    Ca    8.8      07 Oct 2021 04:07  Phos  4.3     10-07  Mg     2.3     10-07    TPro  6.9  /  Alb  3.2<L>  /  TBili  0.6  /  DBili      /  AST  26  /  ALT  17  /  AlkPhos  89  10-07    Creatinine Trend: 2.12 <--, 2.02 <--, 2.11 <--, 2.17 <--, 2.13 <--, 1.70 <--, 1.49 <--, 1.48 <--                        8.2    6.64  )-----------( 231      ( 07 Oct 2021 04:07 )             25.7     Urine Studies:    Creatinine, Random Urine: 123 mg/dL (10-05 @ 12:43)  Chloride, Random Urine: <10 mmol/L (10-05 @ 12:43)  Sodium, Random Urine: 44 mmol/L (10-05 @ 12:43)

## 2021-10-07 NOTE — PROCEDURE NOTE - NSTRACHCUFF_RESP_A_CORE
RN spoke with ex-wife Wendy to update on pt's condition. Ex-wife preferred that sister Heidy Ramos [phone] be the primary contact and if need be decision maker. Pt agreed to his sister Heidy being the primary contact and if need to make decisions if he was unable to. Pt also stated that he was okay for visitor Esau Gardner to come visit.    cuffed

## 2021-10-07 NOTE — PROGRESS NOTE ADULT - SUBJECTIVE AND OBJECTIVE BOX
BRIEF HOSPITAL COURSE  Patient is a 76y old  Male who presents with a chief complaint of R Foot Pain (05 Oct 2021 17:10)      HPI:  Patient is a 76 year old male  with PMH of poorly controlled IDDM Hgb A1C 11.4, HTN, HLD, stage III CKD, CAD s/p CABG and recent partial amputation to right 5th digit (8/19/21) who presented to ED with c/o right foot pain associated with discharge and foul odor. No post op follow up. Came to the ED due to sub obtimal pain control. No fever, chest, pain, palpitations, nausea, vomiting, diarrhea (17 Sep 2021 01:11)    INTERVAL HPI/ HOSPITAL COURSE   MRI confirms suspected osteomyelitis of the R foot - patient followed by podiatry and ID; and underwent debridement and wound VAC placement on R foot. Surgical pathology resulted OM, and wound culture resulting Enterococcus Aeromonal and Klebsiella- treated initially with Zosyn. He developed OREN likely mixed 2/2 to a mixed etiology with prerenal from active infection/ pulmonary edema, intrarenal due to toxicity from IV antibiotics and post renal from urinary retention, Nephrology consulted for optimization of kidney function. IV antibiotic regimen switched to Unasyn and Levaquin, sensitive for cultured organisms; requiring 6 weeks of IV antibiotics.     Patient then developed Pulmonary Edema and was treated with IV lasix. Cardiology consulted and recommending SABIHA with L/R heart cath once infection clears and medically stable. Hospital course further complicated by abdominal distention and constipation for which he received lactulose and had vomiting. AXR shows distended loops of bowel for which NG tube was placed which the patient removed overnight 9/26 - re-attempts to replace NG tube were unsuccessful as patient was non-cooperative.     The patient went on to develop worsening respiratory distress sats 80s% SPO2 on 10L NC : CXR showed possible RLL PNA. likely aspiration.CT abdomen deferred for now as patient is unstable and will not tolerate lying flat. CT chest done showing R>L pulmonary edema vs Pneumonia;. Pulm (DR. Xavier) consulted for possible thoracentesis, and recommended to start albumin + lasix. ICU was consulted for worsening respiratory status. Surgery consulted for pigtail placement, however, inadequate visualization of pocket.  Patient denies any current trouble breathing, chest pain, or cough.   INTERVAL HPI/OVERNIGHT EVENTS:     PRESSORS:  [x ] NO          Antimicrobial:  ampicillin/sulbactam  IVPB 3 Gram(s) IV Intermittent every 6 hours  fluconAZOLE IVPB      fluconAZOLE IVPB 100 milliGRAM(s) IV Intermittent every 24 hours  levoFLOXacin  Tablet 250 milliGRAM(s) Oral every 24 hours    Cardiovascular:  furosemide   Injectable 40 milliGRAM(s) IV Push every 6 hours  metoprolol succinate  milliGRAM(s) Oral daily  NIFEdipine XL 60 milliGRAM(s) Oral daily  tamsulosin 0.4 milliGRAM(s) Oral at bedtime    Pulmonary:    Hematalogic:    Other:  albumin human 25% IVPB 50 milliLiter(s) IV Intermittent every 6 hours  atorvastatin 80 milliGRAM(s) Oral at bedtime  chlorhexidine 2% Cloths 1 Application(s) Topical <User Schedule>  cyanocobalamin 1000 MICROGram(s) Oral daily  ergocalciferol 99663 Unit(s) Oral <User Schedule>  ferrous    sulfate 325 milliGRAM(s) Oral daily  finasteride 5 milliGRAM(s) Oral daily  insulin lispro (ADMELOG) corrective regimen sliding scale   SubCutaneous Before meals and at bedtime  ondansetron Injectable 4 milliGRAM(s) IV Push three times a day PRN  pantoprazole    Tablet 40 milliGRAM(s) Oral before breakfast  polyethylene glycol 3350 17 Gram(s) Oral daily  senna 2 Tablet(s) Oral at bedtime  sodium chloride 0.9% lock flush 10 milliLiter(s) IV Push every 1 hour PRN      Drug Dosing Weight  Height (cm): 170.2 (17 Sep 2021 21:58)  Weight (kg): 86.9 (05 Oct 2021 21:45)  BMI (kg/m2): 30 (05 Oct 2021 21:45)  BSA (m2): 1.99 (05 Oct 2021 21:45)    CENTRAL LINE: [ ] YES [ ] NO  LOCATION:   DATE INSERTED:  REMOVE: [ ] YES [ ] NO  EXPLAIN:    CAST[place]EDA: [ ] YES [ ] NO    DATE INSERTED:  REMOVE:  [ ] YES [ ] NO  EXPLAIN:    A-LINE:  [ ] YES [ ] NO  LOCATION:   DATE INSERTED:  REMOVE:  [ ] YES [ ] NO  EXPLAIN:    PMH/Social Hx/Fam Hx -reviewed admission note, no change since admission  PAST MEDICAL & SURGICAL HISTORY:  HTN (hypertension)    HLD (hyperlipidemia)    DM (diabetes mellitus)    CAD (coronary artery disease)    Glaucoma, angle-closure    [place] (hard of hearing)    S/P CABG (coronary artery bypass graft)    History of thyroid surgery      Heart faliure: acute [ ] chronic [ ] acute or chronic [ ] diastolic [ ] systolic [ ] combied systolic and diastolic[ ]  OREN: ATN[ ] renal medullary necrosis [ ] CKD I [ ]CKDII [ ]CKD III [ ]CKD IV [ ]CKD V [ ]Other pathological lesions [ ]  Abdominal Nutrition Status: malnutrition [ ] cachexia [ ] morbid obesity/BMI=40 [ ] Supplement ordered [___________]     T(C): 36.2 (10-07-21 @ 04:00), Max: 36.7 (10-06-21 @ 12:00)  HR: 78 (10-07-21 @ 06:00)  BP: 90/46 (10-07-21 @ 06:00)  BP(mean): 58 (10-07-21 @ 06:00)  ABP: --  ABP(mean): --  RR: 23 (10-07-21 @ 06:00)  SpO2: 91% (10-07-21 @ 06:00)  Wt(kg): --    ABG - ( 06 Oct 2021 03:48 )  pH, Arterial: 7.46  pH, Blood: x     /  pCO2: 32    /  pO2: 56    / HCO3: 23    / Base Excess: -0.3  /  SaO2: 89                    10-06 @ 07:01  -  10-07 @ 07:00  --------------------------------------------------------  IN: 650 mL / OUT: 966 mL / NET: -316 mL            PHYSICAL EXAM:    GENERAL: NAD, lying in bed comfortably  HEAD:  Atraumatic, Normocephalic  EYES: EOMI, PERRLA, conjunctiva and sclera clear  ENT: Dry mucous membrane  NECK: Supple, No JVD  CHEST/LUNG: Clear to auscultation bilaterally; crackles on right. rhonchi, wheezing, or rubs. Unlabored respirations  HEART: Regular rate and rhythm; GII holosystolic plataeu, at 5th ICS MCL, GI systolic crescendo decrescendo at RUSB, no rubs, or gallops  ABDOMEN: Bowel sounds present; Soft, Nontender, mild distession and frimness, no RT.   EXTREMITIES:  2+ Peripheral Pulses, brisk capillary refill. No clubbing, cyanosis, or edema  NERVOUS SYSTEM:  Alert & Oriented X3, speech clear. No deficits   MSK: FROM all 4 extremities, full and equal strength  SKIN: No rashes or lesions    LABS:  CBC Full  -  ( 07 Oct 2021 04:07 )  WBC Count : 6.64 K/uL  RBC Count : 2.79 M/uL  Hemoglobin : 8.2 g/dL  Hematocrit : 25.7 %  Platelet Count - Automated : 231 K/uL  Mean Cell Volume : 92.1 fl  Mean Cell Hemoglobin : 29.4 pg  Mean Cell Hemoglobin Concentration : 31.9 gm/dL  Auto Neutrophil # : 5.25 K/uL  Auto Lymphocyte # : 0.82 K/uL  Auto Monocyte # : 0.44 K/uL  Auto Eosinophil # : 0.07 K/uL  Auto Basophil # : 0.03 K/uL  Auto Neutrophil % : 79.0 %  Auto Lymphocyte % : 12.3 %  Auto Monocyte % : 6.6 %  Auto Eosinophil % : 1.1 %  Auto Basophil % : 0.5 %    10-07    143  |  104  |  28<H>  ----------------------------<  98  3.6   |  27  |  2.12<H>    Ca    8.8      07 Oct 2021 04:07  Phos  4.3     10-07  Mg     2.3     10-07    TPro  6.9  /  Alb  3.2<L>  /  TBili  0.6  /  DBili  x   /  AST  26  /  ALT  17  /  AlkPhos  89  10-07    PT/INR - ( 07 Oct 2021 04:07 )   PT: 16.6 sec;   INR: 1.42 ratio                 RADIOLOGY & ADDITIONAL STUDIES REVIEWED:      CXR 10/6/2021  Abdominal radiograph: Thickened minor fissure versus atypical presentation of pneumoperitoneum only seen on upright projection. Therefore LEFT lateral decubitus] abdominal radiograph, imaging RIGHT hepatic lobe recommended for confirmation.  Mild bowel ileus    Chest radiograph: Increased RIGHT greater than LEFT pleural effusions and/or basilar airspace consolidations of.  Cardiomegaly.  PICC line catheter tip in SVC RIGHT atrium junction.      Abdominal radiograph: Thickened minor fissure versus atypical presentation of pneumoperitoneum only seen on upright projection. Therefore LEFT lateral decubitus] abdominal radiograph, imaging RIGHT hepatic lobe recommended for confirmation.  Mild bowel ileus    Chest radiograph: Increased RIGHT greater than LEFT pleural effusions and/or basilar airspace consolidations of.  Cardiomegaly.  PICC line catheter tip in SVC RIGHT atrium junction.      [x ]GOALS OF CARE DISCUSSION WITH PATIENT/FAMILY/PROXY:    CRITICAL CARE TIME SPENT: 35 minutes   BRIEF HOSPITAL COURSE  Patient is a 76y old  Male who presents with a chief complaint of R Foot Pain (05 Oct 2021 17:10)      HPI:  Patient is a 76 year old male  with PMH of poorly controlled IDDM Hgb A1C 11.4, HTN, HLD, stage III CKD, CAD s/p CABG and recent partial amputation to right 5th digit (8/19/21) who presented to ED with c/o right foot pain associated with discharge and foul odor. No post op follow up. Came to the ED due to sub obtimal pain control. No fever, chest, pain, palpitations, nausea, vomiting, diarrhea (17 Sep 2021 01:11)    INTERVAL HPI/ HOSPITAL COURSE   MRI confirms suspected osteomyelitis of the R foot - patient followed by podiatry and ID; and underwent debridement and wound VAC placement on R foot. Surgical pathology resulted OM, and wound culture resulting Enterococcus Aeromonal and Klebsiella- treated initially with Zosyn. He developed OREN likely mixed 2/2 to a mixed etiology with prerenal from active infection/ pulmonary edema, intrarenal due to toxicity from IV antibiotics and post renal from urinary retention, Nephrology consulted for optimization of kidney function. IV antibiotic regimen switched to Unasyn and Levaquin, sensitive for cultured organisms; requiring 6 weeks of IV antibiotics.     Patient then developed Pulmonary Edema and was treated with IV lasix. Cardiology consulted and recommending SABIHA with L/R heart cath once infection clears and medically stable. Hospital course further complicated by abdominal distention and constipation for which he received lactulose and had vomiting. AXR shows distended loops of bowel for which NG tube was placed which the patient removed overnight 9/26 - re-attempts to replace NG tube were unsuccessful as patient was non-cooperative.     The patient went on to develop worsening respiratory distress sats 80s% SPO2 on 10L NC : CXR showed possible RLL PNA. likely aspiration.CT abdomen deferred for now as patient is unstable and will not tolerate lying flat. CT chest done showing R>L pulmonary edema vs Pneumonia;. Pulm (DR. Xavier) consulted for possible thoracentesis, and recommended to start albumin + lasix. ICU was consulted for worsening respiratory status. Surgery consulted for pigtail placement, however, inadequate visualization of pocket.  Patient denies any current trouble breathing, chest pain, or cough.   INTERVAL HPI/OVERNIGHT EVENTS:     PRESSORS:  [x ] NO          Antimicrobial:  ampicillin/sulbactam  IVPB 3 Gram(s) IV Intermittent every 6 hours  fluconAZOLE IVPB      fluconAZOLE IVPB 100 milliGRAM(s) IV Intermittent every 24 hours  levoFLOXacin  Tablet 250 milliGRAM(s) Oral every 24 hours    Cardiovascular:  furosemide   Injectable 40 milliGRAM(s) IV Push every 6 hours  metoprolol succinate  milliGRAM(s) Oral daily  NIFEdipine XL 60 milliGRAM(s) Oral daily  tamsulosin 0.4 milliGRAM(s) Oral at bedtime    Pulmonary:    Hematalogic:    Other:  albumin human 25% IVPB 50 milliLiter(s) IV Intermittent every 6 hours  atorvastatin 80 milliGRAM(s) Oral at bedtime  chlorhexidine 2% Cloths 1 Application(s) Topical <User Schedule>  cyanocobalamin 1000 MICROGram(s) Oral daily  ergocalciferol 62948 Unit(s) Oral <User Schedule>  ferrous    sulfate 325 milliGRAM(s) Oral daily  finasteride 5 milliGRAM(s) Oral daily  insulin lispro (ADMELOG) corrective regimen sliding scale   SubCutaneous Before meals and at bedtime  ondansetron Injectable 4 milliGRAM(s) IV Push three times a day PRN  pantoprazole    Tablet 40 milliGRAM(s) Oral before breakfast  polyethylene glycol 3350 17 Gram(s) Oral daily  senna 2 Tablet(s) Oral at bedtime  sodium chloride 0.9% lock flush 10 milliLiter(s) IV Push every 1 hour PRN      Drug Dosing Weight  Height (cm): 170.2 (17 Sep 2021 21:58)  Weight (kg): 86.9 (05 Oct 2021 21:45)  BMI (kg/m2): 30 (05 Oct 2021 21:45)  BSA (m2): 1.99 (05 Oct 2021 21:45)        MADDY: [ X] YES 10/4/2021    PMH/Social Hx/Fam Hx -reviewed admission note, no change since admission  PAST MEDICAL & SURGICAL HISTORY:  HTN (hypertension)    HLD (hyperlipidemia)    DM (diabetes mellitus)    CAD (coronary artery disease)    Glaucoma, angle-closure    [place] (hard of hearing)    S/P CABG (coronary artery bypass graft)    History of thyroid surgery      Heart faliure: acute [ ] chronic [ ] acute or chronic [ ] diastolic [ ] systolic [ ] combied systolic and diastolic[ ]  OREN: ATN[ ] renal medullary necrosis [ ] CKD I [ ]CKDII [ ]CKD III [ ]CKD IV [ ]CKD V [ ]Other pathological lesions [ ]  Abdominal Nutrition Status: malnutrition [ ] cachexia [ ] morbid obesity/BMI=40 [ ] Supplement ordered [___________]     T(C): 36.2 (10-07-21 @ 04:00), Max: 36.7 (10-06-21 @ 12:00)  HR: 78 (10-07-21 @ 06:00)  BP: 90/46 (10-07-21 @ 06:00)  BP(mean): 58 (10-07-21 @ 06:00)  ABP: --  ABP(mean): --  RR: 23 (10-07-21 @ 06:00)  SpO2: 91% (10-07-21 @ 06:00)  Wt(kg): --    ABG - ( 06 Oct 2021 03:48 )  pH, Arterial: 7.46  pH, Blood: x     /  pCO2: 32    /  pO2: 56    / HCO3: 23    / Base Excess: -0.3  /  SaO2: 89                    10-06 @ 07:01  -  10-07 @ 07:00  --------------------------------------------------------  IN: 650 mL / OUT: 966 mL / NET: -316 mL            PHYSICAL EXAM:    GENERAL: NAD, lying in bed comfortably  HEAD:  Atraumatic, Normocephalic  EYES: EOMI, PERRLA, conjunctiva and sclera clear  ENT: Dry mucous membrane  NECK: Supple, No JVD  CHEST/LUNG: Clear to auscultation bilaterally; crackles on right. rhonchi, wheezing, or rubs. Unlabored respirations  HEART: Regular rate and rhythm; GII holosystolic plataeu, at 5th ICS MCL, GI systolic crescendo decrescendo at RUSB, no rubs, or gallops  ABDOMEN: Bowel sounds present; Soft, Nontender, mild distession and frimness, no RT.   EXTREMITIES:  2+ Peripheral Pulses, brisk capillary refill. No clubbing, cyanosis, or edema  NERVOUS SYSTEM:  Alert & Oriented X3, speech clear. No deficits   MSK: FROM all 4 extremities, full and equal strength  SKIN: No rashes or lesions    LABS:  CBC Full  -  ( 07 Oct 2021 04:07 )  WBC Count : 6.64 K/uL  RBC Count : 2.79 M/uL  Hemoglobin : 8.2 g/dL  Hematocrit : 25.7 %  Platelet Count - Automated : 231 K/uL  Mean Cell Volume : 92.1 fl  Mean Cell Hemoglobin : 29.4 pg  Mean Cell Hemoglobin Concentration : 31.9 gm/dL  Auto Neutrophil # : 5.25 K/uL  Auto Lymphocyte # : 0.82 K/uL  Auto Monocyte # : 0.44 K/uL  Auto Eosinophil # : 0.07 K/uL  Auto Basophil # : 0.03 K/uL  Auto Neutrophil % : 79.0 %  Auto Lymphocyte % : 12.3 %  Auto Monocyte % : 6.6 %  Auto Eosinophil % : 1.1 %  Auto Basophil % : 0.5 %    10-07    143  |  104  |  28<H>  ----------------------------<  98  3.6   |  27  |  2.12<H>    Ca    8.8      07 Oct 2021 04:07  Phos  4.3     10-07  Mg     2.3     10-07    TPro  6.9  /  Alb  3.2<L>  /  TBili  0.6  /  DBili  x   /  AST  26  /  ALT  17  /  AlkPhos  89  10-07    PT/INR - ( 07 Oct 2021 04:07 )   PT: 16.6 sec;   INR: 1.42 ratio                 RADIOLOGY & ADDITIONAL STUDIES REVIEWED:      CXR 10/6/2021  Abdominal radiograph: Thickened minor fissure versus atypical presentation of pneumoperitoneum only seen on upright projection. Therefore LEFT lateral decubitus] abdominal radiograph, imaging RIGHT hepatic lobe recommended for confirmation.  Mild bowel ileus    Chest radiograph: Increased RIGHT greater than LEFT pleural effusions and/or basilar airspace consolidations of.  Cardiomegaly.  PICC line catheter tip in SVC RIGHT atrium junction.      Abdominal radiograph: Thickened minor fissure versus atypical presentation of pneumoperitoneum only seen on upright projection. Therefore LEFT lateral decubitus] abdominal radiograph, imaging RIGHT hepatic lobe recommended for confirmation.  Mild bowel ileus    Chest radiograph: Increased RIGHT greater than LEFT pleural effusions and/or basilar airspace consolidations of.  Cardiomegaly.  PICC line catheter tip in SVC RIGHT atrium junction.      [x ]GOALS OF CARE DISCUSSION WITH PATIENT/FAMILY/PROXY:    CRITICAL CARE TIME SPENT: 35 minutes

## 2021-10-07 NOTE — PROGRESS NOTE ADULT - SUBJECTIVE AND OBJECTIVE BOX
`Patient is a 76y old  Male who presents with a chief complaint of R Foot Pain (07 Oct 2021 13:52)    PATIENT IS SEEN AND EXAMINED IN MEDICAL FLOOR.  JOCELYN [    ]    MADDY [   ]      GT [   ]    ALLERGIES:  No Known Allergies      Daily     Daily Weight in k.5 (07 Oct 2021 07:00)    VITALS:    Vital Signs Last 24 Hrs  T(C): 35.8 (07 Oct 2021 16:00), Max: 36.5 (06 Oct 2021 23:36)  T(F): 96.5 (07 Oct 2021 16:00), Max: 97.7 (06 Oct 2021 23:36)  HR: 57 (07 Oct 2021 18:00) (51 - 90)  BP: 118/50 (07 Oct 2021 18:00) (74/44 - 126/45)  BP(mean): 66 (07 Oct 2021 18:00) (47 - 89)  RR: 18 (07 Oct 2021 18:00) (12 - 30)  SpO2: 99% (07 Oct 2021 18:00) (79% - 100%)    LABS:    CBC Full  -  ( 07 Oct 2021 04:07 )  WBC Count : 6.64 K/uL  RBC Count : 2.79 M/uL  Hemoglobin : 8.2 g/dL  Hematocrit : 25.7 %  Platelet Count - Automated : 231 K/uL  Mean Cell Volume : 92.1 fl  Mean Cell Hemoglobin : 29.4 pg  Mean Cell Hemoglobin Concentration : 31.9 gm/dL  Auto Neutrophil # : 5.25 K/uL  Auto Lymphocyte # : 0.82 K/uL  Auto Monocyte # : 0.44 K/uL  Auto Eosinophil # : 0.07 K/uL  Auto Basophil # : 0.03 K/uL  Auto Neutrophil % : 79.0 %  Auto Lymphocyte % : 12.3 %  Auto Monocyte % : 6.6 %  Auto Eosinophil % : 1.1 %  Auto Basophil % : 0.5 %    PT/INR - ( 07 Oct 2021 04:07 )   PT: 16.6 sec;   INR: 1.42 ratio           10-    143  |  104  |  28<H>  ----------------------------<  98  3.6   |  27  |  2.12<H>    Ca    8.8      07 Oct 2021 04:07  Phos  4.3     10-07  Mg     2.3     10-07    TPro  6.9  /  Alb  3.2<L>  /  TBili  0.6  /  DBili  x   /  AST  26  /  ALT  17  /  AlkPhos  89  10-07    CAPILLARY BLOOD GLUCOSE      POCT Blood Glucose.: 149 mg/dL (07 Oct 2021 17:22)  POCT Blood Glucose.: 172 mg/dL (07 Oct 2021 11:12)  POCT Blood Glucose.: 132 mg/dL (06 Oct 2021 22:35)        LIVER FUNCTIONS - ( 07 Oct 2021 04:07 )  Alb: 3.2 g/dL / Pro: 6.9 g/dL / ALK PHOS: 89 U/L / ALT: 17 U/L DA / AST: 26 U/L / GGT: x           Creatinine Trend: 2.12<--, 2.02<--, 2.11<--, 2.17<--, 2.13<--, 1.70<--  I&O's Summary    06 Oct 2021 07:01  -  07 Oct 2021 07:00  --------------------------------------------------------  IN: 650 mL / OUT: 991 mL / NET: -341 mL    07 Oct 2021 07:01  -  07 Oct 2021 18:24  --------------------------------------------------------  IN: 361.2 mL / OUT: 54 mL / NET: 307.2 mL        ABG - ( 07 Oct 2021 16:51 )  pH, Arterial: 7.37  pH, Blood: x     /  pCO2: 39    /  pO2: 91    / HCO3: 22    / Base Excess: -2.5  /  SaO2: 98                  .Tissue Right 5th toe r/o osteomyeltis   @ 13:54   No growth at 5 days  --    No polymorphonuclear cells seen per low power field  No organisms seen per oil power field      .Blood Blood-Venous   @ 10:28   No Growth Final  --  --      .Surgical Swab right foot wound   @ 21:42   Culture yields >4 types of aerobic and/or anaerobic bacteria  Call client services within 7 days if further workup is clinically  indicated. Culture includes  Rare Aeromonas hydrophila/caviae  Few Klebsiella oxytoca/Raoutella ornithinolytica  Few Enterococcus faecalis  Few Streptococcus agalactiae (Group B) isolated  Group B streptococci are susceptible to ampicillin,  penicillin and cefazolin, but may be resistant to  erythromycin and clindamycin.  Recommendations for intrapartum prophylaxis for Group B  streptococci are penicillin or ampicillin.  --  Aeromonas hydrophila/caviae  Klebsiella oxytoca /Raoutella ornithinolytica  Enterococcus faecalis      Bone R 5th metatarsal bone clean ma   @ 03:59   No growth at 5 days  --    No polymorphonuclear leukocytes seen per low power field  No organisms seen per oil power field      .Blood Blood-Venous   @ 21:09   No Growth Final  --  --      .Surgical Swab Right foot plantar wound   @ 21:08   Moderate Streptococcus agalactiae (Group B) isolated  Group B streptococci are susceptible to ampicillin,  penicillin and cefazolin, but may be resistant to  erythromycin and clindamycin.  Recommendations for intrapartum prophylaxis for Group B  streptococci are penicillin or ampicillin.  Moderate Bacteroides fragilis "Susceptibilities not performed"  Normal skin filiberto isolated  --  --          MEDICATIONS:    MEDICATIONS  (STANDING):  ampicillin/sulbactam  IVPB 3 Gram(s) IV Intermittent every 6 hours  aspirin  chewable 81 milliGRAM(s) Oral daily  atorvastatin 80 milliGRAM(s) Oral at bedtime  chlorhexidine 0.12% Liquid 15 milliLiter(s) Oral Mucosa every 12 hours  chlorhexidine 2% Cloths 1 Application(s) Topical <User Schedule>  cyanocobalamin 1000 MICROGram(s) Oral daily  ergocalciferol 33820 Unit(s) Oral <User Schedule>  ferrous    sulfate 325 milliGRAM(s) Oral daily  finasteride 5 milliGRAM(s) Oral daily  fluconAZOLE IVPB      fluconAZOLE IVPB 100 milliGRAM(s) IV Intermittent every 24 hours  heparin   Injectable 5000 Unit(s) SubCutaneous every 8 hours  insulin lispro (ADMELOG) corrective regimen sliding scale   SubCutaneous Before meals and at bedtime  levoFLOXacin  Tablet 250 milliGRAM(s) Oral every 24 hours  metoprolol succinate  milliGRAM(s) Oral daily  pantoprazole    Tablet 40 milliGRAM(s) Oral before breakfast  phenylephrine    Infusion 5 MICROgram(s)/kG/Min (163 mL/Hr) IV Continuous <Continuous>  polyethylene glycol 3350 17 Gram(s) Oral daily  propofol Infusion 10 MICROgram(s)/kG/Min (5.21 mL/Hr) IV Continuous <Continuous>  senna 2 Tablet(s) Oral at bedtime  tamsulosin 0.4 milliGRAM(s) Oral at bedtime      MEDICATIONS  (PRN):  ondansetron Injectable 4 milliGRAM(s) IV Push three times a day PRN Nausea and/or Vomiting  sodium chloride 0.9% lock flush 10 milliLiter(s) IV Push every 1 hour PRN Pre/post blood products, medications, blood draw, and to maintain line patency      REVIEW OF SYSTEMS:                           ALL ROS DONE [ X   ]    CONSTITUTIONAL:  LETHARGIC [   ], FEVER [   ], UNRESPONSIVE [   ]  CVS:  CP  [   ], SOB, [   ], PALPITATIONS [   ], DIZZYNESS [   ]  RS: COUGH [   ], SPUTUM [   ]  GI: ABDOMINAL PAIN [   ], NAUSEA [   ], VOMITINGS [   ], DIARRHEA [   ], CONSTIPATION [   ]  :  DYSURIA [   ], NOCTURIA [   ], INCREASED FREQUENCY [   ], DRIBLING [   ],  SKELETAL: PAINFUL JOINTS [   ], SWOLLEN JOINTS [   ], NECK ACHE [   ], LOW BACK ACHE [   ],  SKIN : ULCERS [   ], RASH [   ], ITCHING [   ]  CNS: HEAD ACHE [   ], DOUBLE VISION [   ], BLURRED VISION [   ], AMS / CONFUSION [   ], SEIZURES [   ], WEAKNESS [   ],TINGLING / NUMBNESS [   ]    PHYSICAL EXAMINATION:  GENERAL APPEARANCE: NO DISTRESS  HEENT:  NO PALLOR, NO  JVD,  NO   NODES, NECK SUPPLE  CVS: S1 +, S2 +,   RS: AEEB,  OCCASIONAL  RALES +,   NO RONCHI, CRACKLES +    HFNC  ABD: SOFT, NT, NO, BS +       EXT: NO PE  SKIN: WARM,   RIGHT FOOT WRAPPED W/ WOUND VAC IN PLACE  SKELETAL:  ROM ACCEPTABLE  CNS:  AAO X  1    RADIOLOGY :    EXAM:  CT ABDOMEN AND PELVIS OC                            PROCEDURE DATE:  2021          INTERPRETATION:  CLINICAL INFORMATION: Small bowel obstruction.    COMPARISON: None.    CONTRAST/COMPLICATIONS:  IV Contrast: NONE  Oral Contrast: Omnipaque 300  Complications: None reported at time of study completion    PROCEDURE:  CT of the Abdomen and Pelvis was performed.  Sagittal and coronal reformats were performed.    FINDINGS:  LOWER CHEST: Small bilateral pleural effusions with compressiveatelectasis of both lower lobes.    LIVER: Within normal limits.  BILE DUCTS: Normal caliber.  GALLBLADDER: Distended. Small layering gallstones.  SPLEEN: Within normal limits.  PANCREAS: Within normal limits.  ADRENALS: Within normal limits.  KIDNEYS/URETERS: No hydronephrosis. Nonobstructing left upper pole calculus, measuring 0.6 cm.    BLADDER: Urinary bladder contains air and a Castañeda catheter balloon.  REPRODUCTIVE ORGANS: Prostate is not enlarged.    BOWEL: No bowel obstruction. Appendix isnormal. Colonic diverticulosis.  PERITONEUM: Mild presacral fluid.  VESSELS: Atherosclerotic changes.  RETROPERITONEUM/LYMPH NODES: No lymphadenopathy.  ABDOMINAL WALL: Within normal limits.  BONES: Degenerative changes. Sternotomy.    IMPRESSION:  No acute intra-abdominal pathology.    Small bilateral pleural effusions with compressive atelectasis of both lower lobes.    ====================================================    EXAM:  MR FOOT RT                            PROCEDURE DATE:  2021          INTERPRETATION:  Clinical Information: Recent fifth metatarsal head resection now with fifth digit pain, swelling and foul discharge.    Comparison: Radiographs of the right foot from 2021 and MRI the right foot from 2021.    Technique:  MRI of the right midfoot and forefoot.  Intravenous Contrast: None.    Findings:    There is a resection at the mid aspect of the fifth metatarsal shaft. There is a lateral soft tissue wound beginning at the level of the amputation which extends distally. There is susceptibility artifact in the region of the amputation consistent with postoperative change. There is hyperintense T2 marrow signal throughout the remaining fifth metatarsal and within the adjacent fourth metatarsal head which is nonspecific and could be related to recent postoperative changes although osteomyelitis is suspected.    There is edema and mild atrophy within the plantar muscles of the foot. There is minimal spurring at the first metatarsophalangeal and hallux sesamoid articulations.    Impression:  Resection at the mid aspect of the fifth metatarsal. Lateral soft tissue wound with marrow signal abnormality throughout the fifth metatarsal and within the adjacent fourth metatarsal head which is nonspecific in the setting of recent surgery although osteomyelitis is suspected.        ASSESSMENT :     Headache    HTN (hypertension)    HLD (hyperlipidemia)    DM (diabetes mellitus)    CAD (coronary artery disease)    Glaucoma, angle-closure    [place] (hard of hearing)    No significant past surgical history    S/P CABG (coronary artery bypass graft)    History of thyroid surgery        PLAN:  HPI:  76M from home, lives with daughter w/ PMH DM on insulin, HTN, HLD, CAD, PSH R partial 5th ray amputation 2021 p/w R foot pain x1 day associated with foul smelling discharge. Pt with sharp pain on the R lateral foot. As per daughter, pt was taking tylenol which did not help his pain prompting the patient to ask his daughter to take him to the hospital. Pt is a poor historian. Daughter states that the patient did not see his podiatrist after the surgery. No fever, chest, pain, palpitations, nausea, vomiting, diarrhea (17 Sep 2021 01:11)    # TRANSFERRED TO ICU FOR WORSENING HYPOXIA AND DYSPNEA    # SUSPECT RIGHT FOOT OSTEOMYELITIS S/P RIGHT 5TH RAY RESECTION [] AND S/P DEBRIDEMENT, GRAFT APPLICATION, BONE BX AND WOUND VAC APPLICATION   ** RECENT ADMISSION FOR RIGHT DIABETIC FOOT ULCER, CELLULITIS, OSTEOMYELITIS RIGHT 5th METATARSAL S/P RIGHT 5TH PARTIAL RAY AMPUTATION [] - BONE PATHOLOGY W/ CLEAN MARGINS    - S/P VANCOMYCIN AND ZOSYN ; NOW ON UNASYN AND LEVAQUIN GIVEN OREN; PER ID SWITCH TO ZOSYN UNTIL 11/3  - RECENTLY HAD SIMON W/ MILD PAD  - REVIEWED WOUND CX [ ENTEROCOCCUS, AEROMONAS, KLEBSIELLA] AND BCX  - BONE BX - acute osteomyelitis and necrotic periosteal tissue.   - ID CONSULT, PODIATRY CONSULT    - PICC LINE PLACED TODAY TO COMPLETE 6 WEEKS OF ANTIBIOTICS - PER ID SWITCH TO ZOSYN UNTIL 11/3    # ACUTE HYPOXIC RESPIRATORY FAILURE SUSPECT S/T PULMONARY EDEMA IN THE SETTING OF SEVERE AORTIC STENOSIS + ASPIRATION PNA - ON LEVAQUIN, STARTED LASIX, CARDIOLOGY CONSULT IN PROGRESS  - S/P CRITICAL CARE CONSULT  - S/P LASIX ON 10/1 - 10/2, 10/4 AND 10/5  - CT CHEST W/ BILATERAL PLEURAL EFFUSIONS [10/5] - PULMONOLOGY CONSULT FOR POSSIBLE THORACENTESIS & WILL CONTINUE LASIX   - ALSO F/U REPEAT ECHOCARDIOGRAM  - CT SURGERY WAS CONSULTED BUT UNABLE TO PLACE PIGTAIL, THUS IR CONSULT IN PROGRESS FOR PIGTAIL PLACEMENT  - PATIENT ON HFNC AT THE TIME OF ENCOUNTER, SUBSEQUENTLY INTUBATED    # ? HYPOTENSION - S/T HYPOVOLEMIA VS. SEPSIS - WAS ON PHENYLEPHRINE, MIDODRINE   - CENTRAL LINE TO BE PLACED W/ VASOPRESSORS  - F/U BCX, F/U UA  - ON UNASYN, LEVAQUIN, FLUCONAZOLE, ID CONSULT IN PROGRESS    # FUNGURIA - ON FLUCONAZOLE    # BOWEL DISTENTION - ? ILEUS - OPTIMIZING ELECTROLYTES - IMPROVED  - SURGERY CONSULT IN PROGRESS  - PASSED 2 BMS ON     # ASPIRATION EVENT WHEN PATIENT REMOVED NGT - ON LEVAQUIN, ID CONSULT IN PROGRESS    # CHOLELITHIASIS - TRENDING LFTS, RUQ U/S - CHOLELITHIASIS, SURGERY CONSULT IN PROGRESS  - GI CONSULT IN PROGRESS - LESS SUSPICIOUS FOR CHOLECYSTITIS    # DYSPEPSIA - PLACED ON PPI BID WITH IMPROVEMENT, UNDERWENT ST EVAL     # ELEVATED TROPONINS - NSTEMI - ? DEMAND - WILL NEED ISCHEMIC EVAL ONCE ACUTE INFECTION RESOLVES PER CARDIOLOGY, CARDIOLOGY CONSULT IN PROGRESS  - ON ASA, STATIN, BB    # OREN ON CKD - S/T ATN AND URINARY RETENTION - S/P IVF, NOW W/ CASTAÑEDA, NEPHROLOGY CONUSLT IN PROGRESS  - ON FLOMAX, ADDED FINASTERIDE    - WITH WORSENING RENAL FXN - NOTED URINARY RETENTION [10/4] - REPLACED CASTAÑEDA    # MEDICAL CLEARANCE FOR SURGERY - RCRI - 2 - 10.1 % 30 DAY RISK OF DEATH, MI OR CARDIAC ARREST  - CARDIOLOGY CONSULT     # DM -  HBA1C - 11  - LANTUS, SSI + FS    # CAD S/P CABG - PLACED ON ASA, STATIN, BB  - ECHO - SEVERE AORTIC STENOSIS, SEVERE CONCENTRIC LVH, G1DD, MILD HI  - CARDIOLOGY CONSULT - DR. BASSETT   - MAY NEED ISCHEMIC EVAL ONCE ACUTE ILLNESS RESOLVES    # HTN - ON METOPROLOL, NICARDIPINE    # SEVERE, ASYMPTOMATIC, STENOSIS - ONCE ACUTE ILLNESS RESOLVES, WILL LIKELY NEED ISCHEMIC WORKUP, SABIHA TO EVALUATE FURTHER. D/W PATIENT, DAUGHTER AND CARDIOLOGY TEAM [PREVIOUSLY SAW DR. BASSETT]    # VITAMIN D DEFICIENCY - ON CHOLECALCIFEROL    # HLD - STATIN    # CASE DISCUSSED AT LENGTH WITH PATIENT AND DAUGHTER, BIRDIE ROBERT AT BEDSIDE AND VIA PHONE @ [phone] [10/5] - CASE DICUSSED AT LENGTH AND ALL QUESTIONS WERE ANSWERED. DAUGHTER UNDERSTOOD THAT PROGNOSIS IS GUARDED.  # PATIENT AND DAUGHTER REFUSED [facility] ON PREVIOUS ADMISSION, PREVIOUSLY WISHED FOR PATIENT RETURN HOME W/ HOME PT; HOWEVER NOW, DAUGHTER WISHES FOR PATIENT TO GO TO [facility], AS PATIENT'S WIFE IS CURRENTLY ADMITTED THERE    # GI AND DVT PPX

## 2021-10-07 NOTE — PROGRESS NOTE ADULT - SUBJECTIVE AND OBJECTIVE BOX
Patient is seen and examined at the bed side, is afebrile.  The creatinine is trending down slowly. He is scheduled for IR guided pigtail placement for AM.      REVIEW OF SYSTEMS: All other review systems are negative      ALLERGIES: No Known Allergies      ICU Vital Signs Last 24 Hrs  T(C): 35.8 (07 Oct 2021 16:00), Max: 36.5 (06 Oct 2021 23:36)  T(F): 96.5 (07 Oct 2021 16:00), Max: 97.7 (06 Oct 2021 23:36)  HR: 58 (07 Oct 2021 18:45) (51 - 90)  BP: 118/54 (07 Oct 2021 18:45) (74/44 - 126/45)  BP(mean): 69 (07 Oct 2021 18:45) (47 - 89)  ABP: --  ABP(mean): --  RR: 18 (07 Oct 2021 18:45) (12 - 30)  SpO2: 100% (07 Oct 2021 18:45) (79% - 100%)        PHYSICAL EXAM:  GENERAL: Not in distress, on  oxygen via NC  CHEST/LUNG:  Not using accessory muscles  HEART: s1 and s2 present  ABDOMEN:  Mild distended   EXTREMITIES: Right foot bandage in placed   CNS: Awake, and  Alert        LABS:                          8.2    6.64  )-----------( 231      ( 07 Oct 2021 04:07 )             25.7                           8.9    7.24  )-----------( 280      ( 06 Oct 2021 06:36 )             27.2       10-07    143  |  104  |  28<H>  ----------------------------<  98  3.6   |  27  |  2.12<H>    Ca    8.8      07 Oct 2021 04:07  Phos  4.3     10-07  Mg     2.3     10-07    TPro  6.9  /  Alb  3.2<L>  /  TBili  0.6  /  DBili  x   /  AST  26  /  ALT  17  /  AlkPhos  89  10-07    09-25    139  |  107  |  41<H>  ----------------------------<  134<H>  4.1   |  21<L>  |  4.05<H>    Ca    8.6      25 Sep 2021 06:40    TPro  6.6  /  Alb  2.3<L>  /  TBili  0.5  /  DBili  x   /  AST  25  /  ALT  15  /  AlkPhos  102  09-25        CAPILLARY BLOOD GLUCOSE  POCT Blood Glucose.: 134 mg/dL (17 Sep 2021 17:11)  POCT Blood Glucose.: 128 mg/dL (17 Sep 2021 11:06)  POCT Blood Glucose.: 157 mg/dL (17 Sep 2021 04:10)      MEDICATIONS  (STANDING):    ampicillin/sulbactam  IVPB 3 Gram(s) IV Intermittent every 6 hours  aspirin  chewable 81 milliGRAM(s) Oral daily  atorvastatin 80 milliGRAM(s) Oral at bedtime  chlorhexidine 0.12% Liquid 15 milliLiter(s) Oral Mucosa every 12 hours  chlorhexidine 2% Cloths 1 Application(s) Topical <User Schedule>  cyanocobalamin 1000 MICROGram(s) Oral daily  ergocalciferol 69568 Unit(s) Oral <User Schedule>  ferrous    sulfate 325 milliGRAM(s) Oral daily  finasteride 5 milliGRAM(s) Oral daily  fluconAZOLE IVPB      fluconAZOLE IVPB 100 milliGRAM(s) IV Intermittent every 24 hours  heparin   Injectable 5000 Unit(s) SubCutaneous every 8 hours  insulin lispro (ADMELOG) corrective regimen sliding scale   SubCutaneous Before meals and at bedtime  levoFLOXacin  Tablet 250 milliGRAM(s) Oral every 24 hours  metoprolol succinate  milliGRAM(s) Oral daily  pantoprazole    Tablet 40 milliGRAM(s) Oral before breakfast  phenylephrine    Infusion 5 MICROgram(s)/kG/Min (163 mL/Hr) IV Continuous <Continuous>  polyethylene glycol 3350 17 Gram(s) Oral daily  propofol Infusion 10 MICROgram(s)/kG/Min (5.21 mL/Hr) IV Continuous <Continuous>  senna 2 Tablet(s) Oral at bedtime  tamsulosin 0.4 milliGRAM(s) Oral at bedtime        RADIOLOGY & ADDITIONAL TESTS:    10/5/21 CT Chest No Cont (10.05.21 @ 14:07) Pulmonary edema with bilateral moderate to large pleural effusions. Underlying bilateral lower lobe compressive atelectasis versus pneumonia.  Mildly enlarged mediastinal lymph nodes, possibly reactive.      10/2/21: Xray Chest 1 View- PORTABLE-Routine (Xray Chest 1 View- PORTABLE-Routine in AM.) (10.02.21 @ 09:46) There is persistent bilateral perihilar/basilar diffuse airspace disease and/or RIGHT effusion.  Cardiomegaly.  No interval change.    Collected Date/Time: 9/22/2021 08:42 EDT   Received Date/Time: 9/23/2021 09:29 EDT   Surgical Pathology Report - Auth (Verified)   Specimen(s) Submitted   1 Right 5th Toe Wound Debridement r/o Osteomyelitis   Final Diagnosis   Right fifth toe; wound debridement:   - Bone with acute osteomyelitis and necrotic periosteal tissue.     9/28/21: US Abdomen Upper Quadrant Right (09.28.21 @ 12:13) Cholelithiasis without evidence of acute cholecystitis. Note is made of right pleural effusion    9/27/21: CT Abdomen and Pelvis w/ Oral Cont (09.27.21 @ 15:53) >No acute intra-abdominal pathology.    Small bilateral pleural effusions with compressive atelectasis of both lower lobes.    9/26/21: Xray Chest 1 View-PORTABLE IMMEDIATE (Xray Chest 1 View-PORTABLE IMMEDIATE .) (09.26.21 @ 15:28) : Small bilateral pleural effusions and mild pulmonary edema. A right lower lobe pneumonia is not excluded.      9/26/21: Xray Abdomen 1 View Portable, IMMEDIATE (Xray Abdomen 1 View Portable, IMMEDIATE .) (09.26.21 @ 15:28) : There is a nasogastric tube with its tip in the stomach. There is gaseous distention of the colon. No pathologic calcifications are seen. The osseous structures are intact with degenerative change is present in the spine.      9/17/21 : MR Foot No Cont, Right (09.17.21 @ 13:04) : Resection at the mid aspect of the fifth metatarsal. Lateral soft tissue wound with marrow signal abnormality throughout the fifth metatarsal and within the adjacent fourth metatarsal head which is nonspecific in the setting of recent surgery although osteomyelitis is suspected.    9/16/21 : Xray Foot AP + Lateral + Oblique, Right (09.16.21 @ 15:03) : No acute finding. No plain film evidence of osteomyelitis.        MICROBIOLOGY DATA:    Urine Microscopic-Add On (NC) (09.22.21 @ 16:14)   Red Blood Cell - Urine: 0-2 /HPF   White Blood Cell - Urine: 3-5 /HPF   Bacteria: Moderate /HPF   Comment - Urine: yeast cells present   Epithelial Cells: Moderate /HPF     Culture - Tissue with Gram Stain (09.22.21 @ 13:54)   Gram Stain: No polymorphonuclear cells seen per low power field   No organisms seen per oil power field   Specimen Source: .Tissue Right 5th toe r/o osteomyeltis   Culture Results: No growth     COVID-19 David Domain Antibody (09.18.21 @ 12:55)   COVID-19 David Domain Antibody Result: >250.00:    Culture - Blood (09.17.21 @ 10:28)   Specimen Source: .Blood Blood-Peripheral   Culture Results: No growth to date.     Culture - Blood (09.17.21 @ 10:28)   Specimen Source: .Blood Blood-Venous   Culture Results: No growth to date.     Culture - Surgical Swab (09.16.21 @ 21:42)   - Amikacin: S <=16   - Amikacin: S <=16   - Amoxicillin/Clavulanic Acid: S <=8/4   - Ampicillin: R >16 These ampicillin results predict results for amoxicillin   - Ampicillin: S <=2 Predicts results to ampicillin/sulbactam, amoxacillin-clavulanate and piperacillin-tazobactam.   - Ampicillin/Sulbactam: S 8/4 Enterobacter, Citrobacter, and Serratia may develop resistance during prolonged therapy (3-4 days)   - Ampicillin/Sulbactam: R >16/8   - Aztreonam: S <=4   - Aztreonam: S <=4   - Cefazolin: R 16 Enterobacter, Citrobacter, and Serratia may develop resistance during prolonged therapy (3-4 days)   - Cefazolin: R >16   - Cefepime: S <=2   - Cefepime: S <=2   - Cefoxitin: S <=8   - Cefoxitin: S <=8   - Ceftazidime: S <=1   - Ceftriaxone: S <=1   - Ceftriaxone: S <=1 Enterobacter, Citrobacter, and Serratia may develop resistance during prolonged therapy   - Ciprofloxacin: S <=0.25   - Ciprofloxacin: S <=0.25   - Ertapenem: S <=0.5   - Gentamicin: S <=2   - Gentamicin: S <=2   - Imipenem: S <=1   - Levofloxacin: S <=0.5   - Levofloxacin: S <=0.5   - Meropenem: S <=1   - Meropenem: S <=1   - Piperacillin/Tazobactam: S <=8   - Piperacillin/Tazobactam: S <=8   - Tetra/Doxy: S 4   - Tobramycin: S <=2   - Trimethoprim/Sulfamethoxazole: S <=0.5/9.5   - Trimethoprim/Sulfamethoxazole: S <=0.5/9.5   - Vancomycin: S 2   Specimen Source: .Surgical Swab right foot wound   Culture Results:   Culture yields >4 types of aerobic and/or anaerobic bacteria   Call client services within 7 days if further workup is clinically   indicated. Culture includes   Rare Aeromonas hydrophila/caviae   Few Klebsiella oxytoca/Raoutella ornithinolytica   Few Enterococcus faecalis   Few Streptococcus agalactiae (Group B) isolated   Group B streptococci are susceptible to ampicillin,   penicillin and cefazolin, but may be resistant to   erythromycin and clindamycin.   Recommendations for intrapartum prophylaxis for Group B   streptococci are penicillin or ampicillin.   Organism Identification: Aeromonas hydrophila/caviae   Klebsiella oxytoca /Raoutella ornithinolytica   Enterococcus faecalis   Organism: Aeromonas hydrophila/caviae   Organism: Klebsiella oxytoca /Raoutella ornithinolytica   Organism: Enterococcus faecalis   COVID-19 PCR . (09.16.21 @ 22:24)   COVID-19 PCR: NotDetec:           Patient is seen and examined at the bed side, is afebrile.  He is s/p Intubation today and scheduled for chest tube placement tomorrow. The creatinine is trending down slowly.       REVIEW OF SYSTEMS: Unable to obtain due to mental status      ALLERGIES: No Known Allergies      ICU Vital Signs Last 24 Hrs  T(C): 35.8 (07 Oct 2021 16:00), Max: 36.5 (06 Oct 2021 23:36)  T(F): 96.5 (07 Oct 2021 16:00), Max: 97.7 (06 Oct 2021 23:36)  HR: 58 (07 Oct 2021 18:45) (51 - 90)  BP: 118/54 (07 Oct 2021 18:45) (74/44 - 126/45)  BP(mean): 69 (07 Oct 2021 18:45) (47 - 89)  ABP: --  ABP(mean): --  RR: 18 (07 Oct 2021 18:45) (12 - 30)  SpO2: 100% (07 Oct 2021 18:45) (79% - 100%)        PHYSICAL EXAM:  GENERAL: Intubated/vented  CHEST/LUNG:  Not using accessory muscles  HEART: s1 and s2 present  ABDOMEN:  Mild distended   EXTREMITIES: Right foot bandage in placed   CNS: Intubated/ vented      LABS:                          8.2    6.64  )-----------( 231      ( 07 Oct 2021 04:07 )             25.7                           8.9    7.24  )-----------( 280      ( 06 Oct 2021 06:36 )             27.2       10-07    143  |  104  |  28<H>  ----------------------------<  98  3.6   |  27  |  2.12<H>    Ca    8.8      07 Oct 2021 04:07  Phos  4.3     10-07  Mg     2.3     10-07    TPro  6.9  /  Alb  3.2<L>  /  TBili  0.6  /  DBili  x   /  AST  26  /  ALT  17  /  AlkPhos  89  10-07    09-25    139  |  107  |  41<H>  ----------------------------<  134<H>  4.1   |  21<L>  |  4.05<H>    Ca    8.6      25 Sep 2021 06:40    TPro  6.6  /  Alb  2.3<L>  /  TBili  0.5  /  DBili  x   /  AST  25  /  ALT  15  /  AlkPhos  102  09-25        CAPILLARY BLOOD GLUCOSE  POCT Blood Glucose.: 134 mg/dL (17 Sep 2021 17:11)  POCT Blood Glucose.: 128 mg/dL (17 Sep 2021 11:06)  POCT Blood Glucose.: 157 mg/dL (17 Sep 2021 04:10)      MEDICATIONS  (STANDING):    ampicillin/sulbactam  IVPB 3 Gram(s) IV Intermittent every 6 hours  aspirin  chewable 81 milliGRAM(s) Oral daily  atorvastatin 80 milliGRAM(s) Oral at bedtime  chlorhexidine 0.12% Liquid 15 milliLiter(s) Oral Mucosa every 12 hours  chlorhexidine 2% Cloths 1 Application(s) Topical <User Schedule>  cyanocobalamin 1000 MICROGram(s) Oral daily  ergocalciferol 58725 Unit(s) Oral <User Schedule>  ferrous    sulfate 325 milliGRAM(s) Oral daily  finasteride 5 milliGRAM(s) Oral daily  fluconAZOLE IVPB      fluconAZOLE IVPB 100 milliGRAM(s) IV Intermittent every 24 hours  heparin   Injectable 5000 Unit(s) SubCutaneous every 8 hours  insulin lispro (ADMELOG) corrective regimen sliding scale   SubCutaneous Before meals and at bedtime  levoFLOXacin  Tablet 250 milliGRAM(s) Oral every 24 hours  metoprolol succinate  milliGRAM(s) Oral daily  pantoprazole    Tablet 40 milliGRAM(s) Oral before breakfast  phenylephrine    Infusion 5 MICROgram(s)/kG/Min (163 mL/Hr) IV Continuous <Continuous>  polyethylene glycol 3350 17 Gram(s) Oral daily  propofol Infusion 10 MICROgram(s)/kG/Min (5.21 mL/Hr) IV Continuous <Continuous>  senna 2 Tablet(s) Oral at bedtime  tamsulosin 0.4 milliGRAM(s) Oral at bedtime        RADIOLOGY & ADDITIONAL TESTS:    10/5/21 CT Chest No Cont (10.05.21 @ 14:07) Pulmonary edema with bilateral moderate to large pleural effusions. Underlying bilateral lower lobe compressive atelectasis versus pneumonia.  Mildly enlarged mediastinal lymph nodes, possibly reactive.      10/2/21: Xray Chest 1 View- PORTABLE-Routine (Xray Chest 1 View- PORTABLE-Routine in AM.) (10.02.21 @ 09:46) There is persistent bilateral perihilar/basilar diffuse airspace disease and/or RIGHT effusion.  Cardiomegaly.  No interval change.    Collected Date/Time: 9/22/2021 08:42 EDT   Received Date/Time: 9/23/2021 09:29 EDT   Surgical Pathology Report - Auth (Verified)   Specimen(s) Submitted   1 Right 5th Toe Wound Debridement r/o Osteomyelitis   Final Diagnosis   Right fifth toe; wound debridement:   - Bone with acute osteomyelitis and necrotic periosteal tissue.     9/28/21: US Abdomen Upper Quadrant Right (09.28.21 @ 12:13) Cholelithiasis without evidence of acute cholecystitis. Note is made of right pleural effusion    9/27/21: CT Abdomen and Pelvis w/ Oral Cont (09.27.21 @ 15:53) >No acute intra-abdominal pathology.    Small bilateral pleural effusions with compressive atelectasis of both lower lobes.    9/26/21: Xray Chest 1 View-PORTABLE IMMEDIATE (Xray Chest 1 View-PORTABLE IMMEDIATE .) (09.26.21 @ 15:28) : Small bilateral pleural effusions and mild pulmonary edema. A right lower lobe pneumonia is not excluded.      9/26/21: Xray Abdomen 1 View Portable, IMMEDIATE (Xray Abdomen 1 View Portable, IMMEDIATE .) (09.26.21 @ 15:28) : There is a nasogastric tube with its tip in the stomach. There is gaseous distention of the colon. No pathologic calcifications are seen. The osseous structures are intact with degenerative change is present in the spine.      9/17/21 : MR Foot No Cont, Right (09.17.21 @ 13:04) : Resection at the mid aspect of the fifth metatarsal. Lateral soft tissue wound with marrow signal abnormality throughout the fifth metatarsal and within the adjacent fourth metatarsal head which is nonspecific in the setting of recent surgery although osteomyelitis is suspected.    9/16/21 : Xray Foot AP + Lateral + Oblique, Right (09.16.21 @ 15:03) : No acute finding. No plain film evidence of osteomyelitis.        MICROBIOLOGY DATA:    Urine Microscopic-Add On (NC) (09.22.21 @ 16:14)   Red Blood Cell - Urine: 0-2 /HPF   White Blood Cell - Urine: 3-5 /HPF   Bacteria: Moderate /HPF   Comment - Urine: yeast cells present   Epithelial Cells: Moderate /HPF     Culture - Tissue with Gram Stain (09.22.21 @ 13:54)   Gram Stain: No polymorphonuclear cells seen per low power field   No organisms seen per oil power field   Specimen Source: .Tissue Right 5th toe r/o osteomyeltis   Culture Results: No growth     COVID-19 David Domain Antibody (09.18.21 @ 12:55)   COVID-19 David Domain Antibody Result: >250.00:    Culture - Blood (09.17.21 @ 10:28)   Specimen Source: .Blood Blood-Peripheral   Culture Results: No growth to date.     Culture - Blood (09.17.21 @ 10:28)   Specimen Source: .Blood Blood-Venous   Culture Results: No growth to date.     Culture - Surgical Swab (09.16.21 @ 21:42)   - Amikacin: S <=16   - Amikacin: S <=16   - Amoxicillin/Clavulanic Acid: S <=8/4   - Ampicillin: R >16 These ampicillin results predict results for amoxicillin   - Ampicillin: S <=2 Predicts results to ampicillin/sulbactam, amoxacillin-clavulanate and piperacillin-tazobactam.   - Ampicillin/Sulbactam: S 8/4 Enterobacter, Citrobacter, and Serratia may develop resistance during prolonged therapy (3-4 days)   - Ampicillin/Sulbactam: R >16/8   - Aztreonam: S <=4   - Aztreonam: S <=4   - Cefazolin: R 16 Enterobacter, Citrobacter, and Serratia may develop resistance during prolonged therapy (3-4 days)   - Cefazolin: R >16   - Cefepime: S <=2   - Cefepime: S <=2   - Cefoxitin: S <=8   - Cefoxitin: S <=8   - Ceftazidime: S <=1   - Ceftriaxone: S <=1   - Ceftriaxone: S <=1 Enterobacter, Citrobacter, and Serratia may develop resistance during prolonged therapy   - Ciprofloxacin: S <=0.25   - Ciprofloxacin: S <=0.25   - Ertapenem: S <=0.5   - Gentamicin: S <=2   - Gentamicin: S <=2   - Imipenem: S <=1   - Levofloxacin: S <=0.5   - Levofloxacin: S <=0.5   - Meropenem: S <=1   - Meropenem: S <=1   - Piperacillin/Tazobactam: S <=8   - Piperacillin/Tazobactam: S <=8   - Tetra/Doxy: S 4   - Tobramycin: S <=2   - Trimethoprim/Sulfamethoxazole: S <=0.5/9.5   - Trimethoprim/Sulfamethoxazole: S <=0.5/9.5   - Vancomycin: S 2   Specimen Source: .Surgical Swab right foot wound   Culture Results:   Culture yields >4 types of aerobic and/or anaerobic bacteria   Call client services within 7 days if further workup is clinically   indicated. Culture includes   Rare Aeromonas hydrophila/caviae   Few Klebsiella oxytoca/Raoutella ornithinolytica   Few Enterococcus faecalis   Few Streptococcus agalactiae (Group B) isolated   Group B streptococci are susceptible to ampicillin,   penicillin and cefazolin, but may be resistant to   erythromycin and clindamycin.   Recommendations for intrapartum prophylaxis for Group B   streptococci are penicillin or ampicillin.   Organism Identification: Aeromonas hydrophila/caviae   Klebsiella oxytoca /Raoutella ornithinolytica   Enterococcus faecalis   Organism: Aeromonas hydrophila/caviae   Organism: Klebsiella oxytoca /Raoutella ornithinolytica   Organism: Enterococcus faecalis   COVID-19 PCR . (09.16.21 @ 22:24)   COVID-19 PCR: NotDetec:

## 2021-10-07 NOTE — PROCEDURE NOTE - NSTRACHPOSTINTU_RESP_A_CORE
Appropriate capnography/Breath sounds bilateral/Breath sounds equal/Chest X-Ray/Positive end tidal Co2 noted Appropriate capnography/Breath sounds bilateral/Breath sounds equal/Chest excursion noted/Chest X-Ray/Positive end tidal Co2 noted

## 2021-10-07 NOTE — PROGRESS NOTE ADULT - ASSESSMENT
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 s/p OR debridement today. Nephrology consulted for abrupt rise in SCr.     1. OREN- likely ATN in the setting of infection and ACEi use. Lisinopril discontinued 9/22. ATN was resolving, however renal fxn now worsened. s/p CT chest with b/l mod to large pleural effusion R>L. Awaiting ?IR rt pigtail catheter.  s/p albumin and continued on Lasix IV q6hrs as per ICU. FeUrea 36%; inconclusive  Kidney/ Bladder US with large distended bladder s/p ibrahim placement on 9/23, then removed. s/p bladder scan 10/4 with 1L urine for which ibrahim was reinserted; however; renal function did not improve post ibrahim; less likely due to obstructive uropathy.  No peripheral eosinophilia- no signs of AIN. C3 & C4 wnl with neg ASO- no signs of PIGN. Strict I/Os. Avoid nephrotoxins/ NSAIDs/ RCA. Monitor BMP.    2. CKD-3a- vlaentino SCr 1.1-1.4, likely CKD due to diabetic nephropathy. Will defer secondary w/u as an outpt. Avoid nephrotoxins  3. HTN 2/2 CKD- BP low for pt Nifedipine was d/c and pt received midodrine 5mg PO x1. Monitor BP Plan as per ICU  4. Acute osteomyelitis- s/p debridement 9/22. Pt on Unasyn (if GFR remains <30; change to q12 dosing) & Levaquin. Plan as per ID      [facility] NEPHROLOGY  Bryn Johnson M.D.  Domingo Booth D.O.  Zakia Rodriguez M.D.  Zonia Adams, MSN, ANP-C  [phone]    [id number] [id number]

## 2021-10-07 NOTE — PROGRESS NOTE ADULT - ATTENDING COMMENTS
IMP: This is a  76 yr old man from home, lives with daughter with DM- uncontrol  on insulin, HTN, HLD, CAD, PSH R partial 5th ray amputation 8/19/2021 p/w R foot pain x1 day associated with foul smelling discharge due to osteomyelitis beig treated with iv antibx .  Pulmonary evaluation requested for hypoxia  with pleural effusion         Assessment:  - Acute Hypoxic Respiratory Failure  - Pulmonary Edema  - Pleural Effusion   - Possible  Aspiration PNA   - Osteomyelitis of right foot   - OREN superimposed on CKD  - Severe AS   - CAD s/p CABG   - Cholelithiasis   - HTN  - HLD  - DM uncontrol   - Diabetic foot ulcer      Plan   -pat is hypotensive   -midodrine   -will need vasopressors to maintain MAP>65  -TLC for med   -Horace   -pat is failing O2 supp via oxygen pendent   -will need BiPaP vs intubation   -thoracic surg unable to place chest tube   -hemodynamic monitoring   -IR for pig tail chest tube placement   -pat is hypotensive now , For IR tomorr   -continue antibx   -monitor blood sugar with coverage   -advance directives  -DVT/ GI prphy .

## 2021-10-07 NOTE — PROGRESS NOTE ADULT - SUBJECTIVE AND OBJECTIVE BOX
Patient was scheduled for right chest tube placement in IR today. I was notified by the primary team that patient's clinical status deteriorated and he is currently not hemodynamically stable to undergo the procedure.    Call IR if clinical situation changes and/or new information becomes available.    Case was d/w Dr. Xavier.

## 2021-10-07 NOTE — PROGRESS NOTE ADULT - ASSESSMENT
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  As per daughter, patient was taking tylenol which did not help his pain prompting the patient to ask his daughter to take him to the hospital. ON admission, he has no fever or Leukocytosis. The Xray of Right foot shows no Osteomyelitis but MRI of Right foot shows Lateral soft tissue wound with marrow signal abnormality throughout the fifth metatarsal which is consistent of Osteomyelitis. He has seen by Podiatry and wound culture has sent, Zosyn and Vancomycin has started. The ID consult requested to assist with further evaluation and antibiotic management.     # Right Fifth metatarsal DFU with drainage and Osteomyelitis- wound cx grew Enterococcus, Aeromonas and Klebsiella - Zosyn is the ideal to cover All organisms but kidney function is worsening, hence change to Unasyn and Levaquin until kidney function is improved - The pathology shows Bone with acute osteomyelitis and necrotic periosteal tissue.   # OREN- s/p urinary retention -s/p ibrahim catheter - s/p discontinue ACEI  # RLL pneumonia- most likely Aspiration, S/p Vomiting - On Unasyn  # Large bowel distension  # Candiduria- 9/22/21  # Pulmonary edema with bilateral moderate to large pleural effusions- on CT chest, 10/5/21    would recommend:    1. IR guided pigtail placement with Bacterial, Fungal and AFB culture  2. Supplemental oxygenation and bronchodilator  as needed  3. Monitor  kidney function and c/w adjusted doses of Levaquin and  Unasyn as per crcl  4. Wound care as per Podiatry  5. Aspiration precaution  6. May change to Zosyn and oral fluconazole on discharge to continue until 11/3/21 to cover Osteomyelitis    d/w ICU team     Attending Attestation:    Spent more than 45 minutes on total encounter, more than 50 % of the visit was spent counseling and/or coordinating care by the Attending physician.  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  As per daughter, patient was taking tylenol which did not help his pain prompting the patient to ask his daughter to take him to the hospital. ON admission, he has no fever or Leukocytosis. The Xray of Right foot shows no Osteomyelitis but MRI of Right foot shows Lateral soft tissue wound with marrow signal abnormality throughout the fifth metatarsal which is consistent of Osteomyelitis. He has seen by Podiatry and wound culture has sent, Zosyn and Vancomycin has started. The ID consult requested to assist with further evaluation and antibiotic management.     # Right Fifth metatarsal DFU with drainage and Osteomyelitis- wound cx grew Enterococcus, Aeromonas and Klebsiella - Zosyn is the ideal to cover All organisms but kidney function is worsening, hence change to Unasyn and Levaquin until kidney function is improved - The pathology shows Bone with acute osteomyelitis and necrotic periosteal tissue.   # OREN- s/p urinary retention -s/p ibrahim catheter - s/p discontinue ACEI  # RLL pneumonia- most likely Aspiration, S/p Vomiting - On Unasyn  # Large bowel distension  # Candiduria- 9/22/21  # Pulmonary edema with bilateral moderate to large pleural effusions- on CT chest, 10/5/21- s/p intubation 10/7    would recommend:    1. IR guided pigtail placement with Bacterial, Fungal and AFB culture in AM  2. Management of Vent as per ICU protocol  3. Monitor  kidney function and c/w adjusted doses of Levaquin and  Unasyn as per crcl  4. Wound care as per Podiatry  5. Aspiration precaution  6. May change to Zosyn and oral fluconazole on discharge to continue until 11/3/21 to cover Osteomyelitis    d/w ICU team     Attending Attestation:    Spent more than 45 minutes on total encounter, more than 50 % of the visit was spent counseling and/or coordinating care by the Attending physician.

## 2021-10-08 LAB
ALBUMIN FLD-MCNC: 1.5 G/DL — SIGNIFICANT CHANGE UP
ALBUMIN SERPL ELPH-MCNC: 3.1 G/DL — LOW (ref 3.5–5)
ALP SERPL-CCNC: 99 U/L — SIGNIFICANT CHANGE UP (ref 40–120)
ALT FLD-CCNC: 493 U/L DA — HIGH (ref 10–60)
ANION GAP SERPL CALC-SCNC: 11 MMOL/L — SIGNIFICANT CHANGE UP (ref 5–17)
AST SERPL-CCNC: 1057 U/L — HIGH (ref 10–40)
B PERT IGG+IGM PNL SER: CLEAR — SIGNIFICANT CHANGE UP
BASE EXCESS BLDA CALC-SCNC: -0.3 MMOL/L — SIGNIFICANT CHANGE UP (ref -2–3)
BASOPHILS # BLD AUTO: 0.05 K/UL — SIGNIFICANT CHANGE UP (ref 0–0.2)
BASOPHILS NFR BLD AUTO: 0.5 % — SIGNIFICANT CHANGE UP (ref 0–2)
BILIRUB SERPL-MCNC: 0.6 MG/DL — SIGNIFICANT CHANGE UP (ref 0.2–1.2)
BLOOD GAS COMMENTS ARTERIAL: SIGNIFICANT CHANGE UP
BUN SERPL-MCNC: 38 MG/DL — HIGH (ref 7–18)
CALCIUM SERPL-MCNC: 8.5 MG/DL — SIGNIFICANT CHANGE UP (ref 8.4–10.5)
CHLORIDE SERPL-SCNC: 107 MMOL/L — SIGNIFICANT CHANGE UP (ref 96–108)
CO2 SERPL-SCNC: 26 MMOL/L — SIGNIFICANT CHANGE UP (ref 22–31)
COLOR FLD: YELLOW — SIGNIFICANT CHANGE UP
CREAT SERPL-MCNC: 3.03 MG/DL — HIGH (ref 0.5–1.3)
CRP SERPL-MCNC: 53 MG/L — HIGH
EOSINOPHIL # BLD AUTO: 0 K/UL — SIGNIFICANT CHANGE UP (ref 0–0.5)
EOSINOPHIL NFR BLD AUTO: 0 % — SIGNIFICANT CHANGE UP (ref 0–6)
ERYTHROCYTE [SEDIMENTATION RATE] IN BLOOD: 85 MM/HR — HIGH (ref 0–20)
FLUID INTAKE SUBSTANCE CLASS: SIGNIFICANT CHANGE UP
FLUID SEGMENTED GRANULOCYTES: 2 % — SIGNIFICANT CHANGE UP
GLUCOSE FLD-MCNC: 102 MG/DL — SIGNIFICANT CHANGE UP
GLUCOSE SERPL-MCNC: 93 MG/DL — SIGNIFICANT CHANGE UP (ref 70–99)
GRAM STN FLD: SIGNIFICANT CHANGE UP
HCO3 BLDA-SCNC: 24 MMOL/L — SIGNIFICANT CHANGE UP (ref 21–28)
HCT VFR BLD CALC: 27.1 % — LOW (ref 39–50)
HGB BLD-MCNC: 8.7 G/DL — LOW (ref 13–17)
HOROWITZ INDEX BLDA+IHG-RTO: 100 — SIGNIFICANT CHANGE UP
IMM GRANULOCYTES NFR BLD AUTO: 0.9 % — SIGNIFICANT CHANGE UP (ref 0–1.5)
LACTATE SERPL-SCNC: 1.2 MMOL/L — SIGNIFICANT CHANGE UP (ref 0.7–2)
LDH SERPL L TO P-CCNC: 100 U/L — SIGNIFICANT CHANGE UP
LYMPHOCYTES # BLD AUTO: 1.12 K/UL — SIGNIFICANT CHANGE UP (ref 1–3.3)
LYMPHOCYTES # BLD AUTO: 10.9 % — LOW (ref 13–44)
LYMPHOCYTES # FLD: 33 % — SIGNIFICANT CHANGE UP
MAGNESIUM SERPL-MCNC: 2.4 MG/DL — SIGNIFICANT CHANGE UP (ref 1.6–2.6)
MCHC RBC-ENTMCNC: 29.6 PG — SIGNIFICANT CHANGE UP (ref 27–34)
MCHC RBC-ENTMCNC: 32.1 GM/DL — SIGNIFICANT CHANGE UP (ref 32–36)
MCV RBC AUTO: 92.2 FL — SIGNIFICANT CHANGE UP (ref 80–100)
MESOTHL CELL # FLD: 8 % — SIGNIFICANT CHANGE UP
MONOCYTES # BLD AUTO: 0.42 K/UL — SIGNIFICANT CHANGE UP (ref 0–0.9)
MONOCYTES NFR BLD AUTO: 4.1 % — SIGNIFICANT CHANGE UP (ref 2–14)
MONOS+MACROS # FLD: 3 % — SIGNIFICANT CHANGE UP
NEUTROPHILS # BLD AUTO: 8.62 K/UL — HIGH (ref 1.8–7.4)
NEUTROPHILS NFR BLD AUTO: 83.6 % — HIGH (ref 43–77)
NIGHT BLUE STAIN TISS: SIGNIFICANT CHANGE UP
NRBC # BLD: 0 /100 WBCS — SIGNIFICANT CHANGE UP (ref 0–0)
NRBC # FLD: 33 % — HIGH (ref 0–0)
PCO2 BLDA: 38 MMHG — SIGNIFICANT CHANGE UP (ref 35–48)
PH BLDA: 7.41 — SIGNIFICANT CHANGE UP (ref 7.35–7.45)
PH FLD: 8.4 — SIGNIFICANT CHANGE UP
PHOSPHATE SERPL-MCNC: 6.4 MG/DL — HIGH (ref 2.5–4.5)
PLATELET # BLD AUTO: 244 K/UL — SIGNIFICANT CHANGE UP (ref 150–400)
PO2 BLDA: 96 MMHG — SIGNIFICANT CHANGE UP (ref 83–108)
POTASSIUM SERPL-MCNC: 3.8 MMOL/L — SIGNIFICANT CHANGE UP (ref 3.5–5.3)
POTASSIUM SERPL-SCNC: 3.8 MMOL/L — SIGNIFICANT CHANGE UP (ref 3.5–5.3)
PROT FLD-MCNC: 2.6 G/DL — SIGNIFICANT CHANGE UP
PROT SERPL-MCNC: 6.9 G/DL — SIGNIFICANT CHANGE UP (ref 6–8.3)
RBC # BLD: 2.94 M/UL — LOW (ref 4.2–5.8)
RBC # FLD: 14.9 % — HIGH (ref 10.3–14.5)
RCV VOL RI: 798 /UL — HIGH (ref 0–5)
SAO2 % BLDA: 98 % — SIGNIFICANT CHANGE UP
SODIUM SERPL-SCNC: 144 MMOL/L — SIGNIFICANT CHANGE UP (ref 135–145)
SPECIMEN SOURCE: SIGNIFICANT CHANGE UP
SPECIMEN SOURCE: SIGNIFICANT CHANGE UP
TOTAL NUCLEATED CELL COUNT, BODY FLUID: 33 /UL — SIGNIFICANT CHANGE UP
TUBE TYPE: SIGNIFICANT CHANGE UP
WBC # BLD: 10.3 K/UL — SIGNIFICANT CHANGE UP (ref 3.8–10.5)
WBC # FLD AUTO: 10.3 K/UL — SIGNIFICANT CHANGE UP (ref 3.8–10.5)

## 2021-10-08 PROCEDURE — 88112 CYTOPATH CELL ENHANCE TECH: CPT | Mod: 26

## 2021-10-08 PROCEDURE — 71045 X-RAY EXAM CHEST 1 VIEW: CPT | Mod: 26

## 2021-10-08 PROCEDURE — 99222 1ST HOSP IP/OBS MODERATE 55: CPT

## 2021-10-08 PROCEDURE — 88305 TISSUE EXAM BY PATHOLOGIST: CPT | Mod: 26

## 2021-10-08 PROCEDURE — 71045 X-RAY EXAM CHEST 1 VIEW: CPT | Mod: 26,77

## 2021-10-08 PROCEDURE — 32551 INSERTION OF CHEST TUBE: CPT | Mod: RT

## 2021-10-08 PROCEDURE — 99232 SBSQ HOSP IP/OBS MODERATE 35: CPT | Mod: 57,25

## 2021-10-08 RX ORDER — AMPICILLIN SODIUM AND SULBACTAM SODIUM 250; 125 MG/ML; MG/ML
3 INJECTION, POWDER, FOR SUSPENSION INTRAMUSCULAR; INTRAVENOUS EVERY 12 HOURS
Refills: 0 | Status: DISCONTINUED | OUTPATIENT
Start: 2021-10-08 | End: 2021-10-08

## 2021-10-08 RX ORDER — FENTANYL CITRATE 50 UG/ML
0.5 INJECTION INTRAVENOUS
Qty: 2500 | Refills: 0 | Status: DISCONTINUED | OUTPATIENT
Start: 2021-10-08 | End: 2021-10-11

## 2021-10-08 RX ORDER — NOREPINEPHRINE BITARTRATE/D5W 8 MG/250ML
0.05 PLASTIC BAG, INJECTION (ML) INTRAVENOUS
Qty: 16 | Refills: 0 | Status: DISCONTINUED | OUTPATIENT
Start: 2021-10-08 | End: 2021-10-09

## 2021-10-08 RX ORDER — MEROPENEM 1 G/30ML
500 INJECTION INTRAVENOUS EVERY 12 HOURS
Refills: 0 | Status: DISCONTINUED | OUTPATIENT
Start: 2021-10-08 | End: 2021-10-10

## 2021-10-08 RX ORDER — FUROSEMIDE 40 MG
40 TABLET ORAL ONCE
Refills: 0 | Status: DISCONTINUED | OUTPATIENT
Start: 2021-10-08 | End: 2021-10-08

## 2021-10-08 RX ORDER — VANCOMYCIN HCL 1 G
1250 VIAL (EA) INTRAVENOUS DAILY
Refills: 0 | Status: DISCONTINUED | OUTPATIENT
Start: 2021-10-08 | End: 2021-10-10

## 2021-10-08 RX ORDER — FUROSEMIDE 40 MG
40 TABLET ORAL ONCE
Refills: 0 | Status: COMPLETED | OUTPATIENT
Start: 2021-10-08 | End: 2021-10-08

## 2021-10-08 RX ADMIN — FINASTERIDE 5 MILLIGRAM(S): 5 TABLET, FILM COATED ORAL at 11:02

## 2021-10-08 RX ADMIN — FENTANYL CITRATE 4.35 MICROGRAM(S)/KG/HR: 50 INJECTION INTRAVENOUS at 09:39

## 2021-10-08 RX ADMIN — FLUCONAZOLE 100 MILLIGRAM(S): 150 TABLET ORAL at 02:39

## 2021-10-08 RX ADMIN — PROPOFOL 15.6 MICROGRAM(S)/KG/MIN: 10 INJECTION, EMULSION INTRAVENOUS at 14:58

## 2021-10-08 RX ADMIN — Medication 166.67 MILLIGRAM(S): at 11:11

## 2021-10-08 RX ADMIN — CHLORHEXIDINE GLUCONATE 15 MILLILITER(S): 213 SOLUTION TOPICAL at 17:03

## 2021-10-08 RX ADMIN — AMPICILLIN SODIUM AND SULBACTAM SODIUM 200 GRAM(S): 250; 125 INJECTION, POWDER, FOR SUSPENSION INTRAMUSCULAR; INTRAVENOUS at 05:12

## 2021-10-08 RX ADMIN — Medication 40 MILLIGRAM(S): at 15:41

## 2021-10-08 RX ADMIN — PREGABALIN 1000 MICROGRAM(S): 225 CAPSULE ORAL at 13:12

## 2021-10-08 RX ADMIN — TAMSULOSIN HYDROCHLORIDE 0.4 MILLIGRAM(S): 0.4 CAPSULE ORAL at 21:07

## 2021-10-08 RX ADMIN — Medication 325 MILLIGRAM(S): at 11:02

## 2021-10-08 RX ADMIN — PROPOFOL 15.6 MICROGRAM(S)/KG/MIN: 10 INJECTION, EMULSION INTRAVENOUS at 10:00

## 2021-10-08 RX ADMIN — MEROPENEM 100 MILLIGRAM(S): 1 INJECTION INTRAVENOUS at 17:07

## 2021-10-08 RX ADMIN — CHLORHEXIDINE GLUCONATE 15 MILLILITER(S): 213 SOLUTION TOPICAL at 05:11

## 2021-10-08 RX ADMIN — CHLORHEXIDINE GLUCONATE 1 APPLICATION(S): 213 SOLUTION TOPICAL at 05:12

## 2021-10-08 RX ADMIN — ATORVASTATIN CALCIUM 80 MILLIGRAM(S): 80 TABLET, FILM COATED ORAL at 21:07

## 2021-10-08 RX ADMIN — PANTOPRAZOLE SODIUM 40 MILLIGRAM(S): 20 TABLET, DELAYED RELEASE ORAL at 21:07

## 2021-10-08 RX ADMIN — Medication 4.07 MICROGRAM(S)/KG/MIN: at 10:16

## 2021-10-08 RX ADMIN — PHENYLEPHRINE HYDROCHLORIDE 32.6 MICROGRAM(S)/KG/MIN: 10 INJECTION INTRAVENOUS at 02:08

## 2021-10-08 NOTE — PROGRESS NOTE ADULT - SUBJECTIVE AND OBJECTIVE BOX
Podiatry Interval: Pt seen at bedside in ICU. Patient is intubated and pending chest tube placement today.     Podiatry HPI: 76 year old male with a PMHx of DM, HTN, HLD, CAD to ED presents to the ED for a Right Foot s/p 5th foot partial ray resection and fillet toe flap. Patient states that he has sharp localized non-radiating pain to the Right foot 5th metatarsal. States that after his surgery, he did not see a podiatrist and he came into the ED because he saw drainage and was having pain. Denies any constitutional symptoms of N/V/C/F/SOB. Denies any other pedal complaints at this time. Denies calf pain today, bilaterally.      Medications ampicillin/sulbactam  IVPB 3 Gram(s) IV Intermittent every 12 hours  atorvastatin 80 milliGRAM(s) Oral at bedtime  chlorhexidine 0.12% Liquid 15 milliLiter(s) Oral Mucosa every 12 hours  chlorhexidine 2% Cloths 1 Application(s) Topical <User Schedule>  cyanocobalamin 1000 MICROGram(s) Oral daily  ergocalciferol 94303 Unit(s) Oral <User Schedule>  fentaNYL   Infusion 0.5 MICROgram(s)/kG/Hr IV Continuous <Continuous>  ferrous    sulfate 325 milliGRAM(s) Oral daily  finasteride 5 milliGRAM(s) Oral daily  insulin lispro (ADMELOG) corrective regimen sliding scale   SubCutaneous Before meals and at bedtime  levoFLOXacin  Tablet 250 milliGRAM(s) Oral every 24 hours  norepinephrine Infusion 0.05 MICROgram(s)/kG/Min IV Continuous <Continuous>  ondansetron Injectable 4 milliGRAM(s) IV Push three times a day PRN  pantoprazole  Injectable 40 milliGRAM(s) IV Push at bedtime  propofol Infusion 30 MICROgram(s)/kG/Min IV Continuous <Continuous>  sodium chloride 0.9% lock flush 10 milliLiter(s) IV Push every 1 hour PRN  tamsulosin 0.4 milliGRAM(s) Oral at bedtime    FH: Family history of acute myocardial infarction (Father)    Family history of diabetes mellitus (Mother, Sibling)    Family history of hypertension (Mother, Sibling)    Family history of hyperlipidemia (Mother, Sibling)    ,   PMH: HTN (hypertension)    HLD (hyperlipidemia)    DM (diabetes mellitus)    CAD (coronary artery disease)    Glaucoma, angle-closure    Habematolel (hard of hearing)       PSH: No significant past surgical history    S/P CABG (coronary artery bypass graft)    History of thyroid surgery        Labs                          8.7    10.30 )-----------( 244      ( 08 Oct 2021 03:52 )             27.1      10-08    144  |  107  |  38<H>  ----------------------------<  93  3.8   |  26  |  3.03<H>    Ca    8.5      08 Oct 2021 03:52  Phos  6.4     10-08  Mg     2.4     10-08    TPro  6.9  /  Alb  3.1<L>  /  TBili  0.6  /  DBili  x   /  AST  1057<H>  /  ALT  493<H>  /  AlkPhos  99  10-08     Vital Signs Last 24 Hrs  T(C): 36.2 (08 Oct 2021 08:00), Max: 36.6 (07 Oct 2021 23:00)  T(F): 97.2 (08 Oct 2021 08:00), Max: 97.8 (07 Oct 2021 23:00)  HR: 56 (08 Oct 2021 08:30) (51 - 64)  BP: 126/57 (08 Oct 2021 08:30) (74/44 - 136/60)  BP(mean): 75 (08 Oct 2021 08:30) (47 - 79)  RR: 15 (08 Oct 2021 08:30) (11 - 29)  SpO2: 100% (08 Oct 2021 08:30) (79% - 100%)  Sedimentation Rate, Erythrocyte: 85 mm/Hr (10-08-21 @ 03:52)  Sedimentation Rate, Erythrocyte: 108 mm/Hr (10-02-21 @ 07:02)         C-Reactive Protein, Serum: 43 mg/L (10-02-21 @ 14:05)  C-Reactive Protein, Serum: 45 mg/L (09-16-21 @ 23:33)  C-Reactive Protein, Serum: 85 mg/L (08-22-21 @ 21:30)  C-Reactive Protein, Serum: 67 mg/L (08-15-21 @ 11:55)   WBC Count: 10.30 K/uL (10-08-21 @ 03:52)    PT/INR - ( 07 Oct 2021 04:07 )   PT: 16.6 sec;   INR: 1.42 ratio         CAPILLARY BLOOD GLUCOSE      POCT Blood Glucose.: 121 mg/dL (08 Oct 2021 05:38)  POCT Blood Glucose.: 130 mg/dL (07 Oct 2021 22:01)  POCT Blood Glucose.: 149 mg/dL (07 Oct 2021 17:22)  POCT Blood Glucose.: 172 mg/dL (07 Oct 2021 11:12)      ROS: All others negative unless otherwise stated in the HPI        PHYSICAL EXAM Kept bandage intact   LE Focused:    Vasc:  DP/PT pulses were faintly palpable, bilateral. DP/PT pulses were monophasic on doppler, bilaterally. CFT<3 seconds to all digits.   Derm: Surgical site with graft in place to R 5th ray, granular wound bed through the graft with patches of necrotic islands noted, minimal drainage or discharge. minimal erythema and edema noted, eschar forming over the wound. Dorsal foot wound noted with tendon exposed, DTI noted to b/l heel  Neuro: Protective sensation grossly diminished, b/l  MSK: 5/5 muscle strength noted to the pedal compartments. 5th digit amputation R foot        Imaging:    3 views right foot. Prior 8/20/2021.    Status post resection of the fifth toe and distal fifth metatarsal unchanged in appearance. No focal bone lysis or unusual periosteal reaction to suggest osteomyelitis. No acute fracture or dislocation. The remainder study unchanged. No soft tissue gas.    IMPRESSION: No acute finding. No plain film evidence of osteomyelitis.      MRI R foot:     INTERPRETATION:  Clinical Information: Recent fifth metatarsal head resection now with fifth digit pain, swelling and foul discharge.    Comparison: Radiographs of the right foot from 9/16/2021 and MRI the right foot from 8/16/2021.    Technique:  MRI of the right midfoot and forefoot.  Intravenous Contrast: None.    Findings:    There is a resection at the mid aspect of the fifth metatarsal shaft. There is a lateral soft tissue wound beginning at the level of the amputation which extends distally. There is susceptibility artifact in the region of the amputation consistent with postoperative change. There is hyperintense T2 marrow signal throughout the remaining fifth metatarsal and within the adjacent fourth metatarsal head which is nonspecific and could be related to recent postoperative changes although osteomyelitis is suspected.    There is edema and mild atrophy within the plantar muscles of the foot. There is minimal spurring at the first metatarsophalangeal and hallux sesamoid articulations.    Impression:  Resection at the mid aspect of the fifth metatarsal. Lateral soft tissue wound with marrow signal abnormality throughout the fifth metatarsal and within the adjacent fourth metatarsal head which is nonspecific in the setting of recent surgery although osteomyelitis is suspected.        Culture Results:     Rare Aeromonas hydrophila/caviae   Few Klebsiella oxytoca/Raoutella ornithinolytica   Few Enterococcus faecalis   Few Streptococcus agalactiae (Group B) isolated   Group B streptococci are susceptible to ampicillin,   penicillin and cefazolin, but may be resistant to   erythromycin and clindamycin.   Recommendations for intrapartum prophylaxis for Group B   streptococci are penicillin or ampicillin. (09.16.21 @ 21:42)     Gram Stain:   No polymorphonuclear cells seen per low power field   No organisms seen per oil power field (09.22.21 @ 13:54)       Historical Values  Gram Stain:   No polymorphonuclear cells seen per low power field   No organisms seen per oil power field (09.22.21 @ 13:54)   Gram Stain:   No polymorphonuclear leukocytes seen per low power field   No organisms seen per oil power field (08.20.21 @ 03:59)

## 2021-10-08 NOTE — PROGRESS NOTE ADULT - SUBJECTIVE AND OBJECTIVE BOX
Pt seen at bedside  Patient is a 76y old  Male who presents with a chief complaint of R Foot Pain (08 Oct 2021 12:24)      INTERVAL HPI/OVERNIGHT EVENTS:  Pt intubated yesterday      Vital Signs Last 24 Hrs  T(C): 36 (08 Oct 2021 11:00), Max: 36.6 (07 Oct 2021 23:00)  T(F): 96.8 (08 Oct 2021 11:00), Max: 97.8 (07 Oct 2021 23:00)  HR: 57 (08 Oct 2021 13:15) (51 - 67)  BP: 136/54 (08 Oct 2021 13:15) (82/57 - 139/55)  BP(mean): 74 (08 Oct 2021 13:15) (62 - 84)  RR: 15 (08 Oct 2021 13:15) (11 - 27)  SpO2: 100% (08 Oct 2021 13:15) (79% - 100%)    Physical Exam:    Gen: intubated  HEENT: anicteric  Chest: equal chest rise  Abd: soft, obese  Pelvic: Molina catheter in place with clear urine      MEDICATIONS  (STANDING):  atorvastatin 80 milliGRAM(s) Oral at bedtime  chlorhexidine 0.12% Liquid 15 milliLiter(s) Oral Mucosa every 12 hours  chlorhexidine 2% Cloths 1 Application(s) Topical <User Schedule>  cyanocobalamin 1000 MICROGram(s) Oral daily  ergocalciferol 33817 Unit(s) Oral <User Schedule>  fentaNYL   Infusion 0.5 MICROgram(s)/kG/Hr (4.35 mL/Hr) IV Continuous <Continuous>  ferrous    sulfate 325 milliGRAM(s) Oral daily  finasteride 5 milliGRAM(s) Oral daily  furosemide   Injectable 40 milliGRAM(s) IV Push once  insulin lispro (ADMELOG) corrective regimen sliding scale   SubCutaneous Before meals and at bedtime  meropenem  IVPB 500 milliGRAM(s) IV Intermittent every 12 hours  norepinephrine Infusion 0.05 MICROgram(s)/kG/Min (4.07 mL/Hr) IV Continuous <Continuous>  pantoprazole  Injectable 40 milliGRAM(s) IV Push at bedtime  propofol Infusion 30 MICROgram(s)/kG/Min (15.6 mL/Hr) IV Continuous <Continuous>  tamsulosin 0.4 milliGRAM(s) Oral at bedtime  vancomycin  IVPB 1250 milliGRAM(s) IV Intermittent daily    MEDICATIONS  (PRN):  ondansetron Injectable 4 milliGRAM(s) IV Push three times a day PRN Nausea and/or Vomiting  sodium chloride 0.9% lock flush 10 milliLiter(s) IV Push every 1 hour PRN Pre/post blood products, medications, blood draw, and to maintain line patency      Labs:                          8.7    10.30 )-----------( 244      ( 08 Oct 2021 03:52 )             27.1     10-08    144  |  107  |  38<H>  ----------------------------<  93  3.8   |  26  |  3.03<H>    Ca    8.5      08 Oct 2021 03:52  Phos  6.4     10-08  Mg     2.4     10-08    TPro  6.9  /  Alb  3.1<L>  /  TBili  0.6  /  DBili  x   /  AST  1057<H>  /  ALT  493<H>  /  AlkPhos  99  10-08    PT/INR - ( 07 Oct 2021 04:07 )   PT: 16.6 sec;   INR: 1.42 ratio             I&O's Detail    07 Oct 2021 07:01  -  08 Oct 2021 07:00  --------------------------------------------------------  IN:    Enteral Tube Flush: 100 mL    IV PiggyBack: 100 mL    IV PiggyBack: 350 mL    Phenylephrine: 195.5 mL    Phenylephrine: 358.6 mL    Propofol: 93.6 mL    Propofol: 119.6 mL  Total IN: 1317.3 mL    OUT:    Indwelling Catheter - Urethral (mL): 204 mL  Total OUT: 204 mL    Total NET: 1113.3 mL      08 Oct 2021 07:01  -  08 Oct 2021 13:39  --------------------------------------------------------  IN:    FentaNYL: 8.6 mL    Norepinephrine: 4.1 mL    Phenylephrine: 13 mL    Propofol: 52 mL  Total IN: 77.7 mL    OUT:    Indwelling Catheter - Urethral (mL): 45 mL  Total OUT: 45 mL    Total NET: 32.7 mL          Radiology:

## 2021-10-08 NOTE — PROGRESS NOTE ADULT - ASSESSMENT
b/l Pleural effusion, Right greater than left worsened on x-ray  1. Pigtail placed at bedside on waterseal. 1L return of serous fluid. CT clamped  2. Will return in 1 hour to unclamp  3. Monitor output and keep on waterseal  4. Will follow  5. D/w Dr. Caceres

## 2021-10-08 NOTE — PROGRESS NOTE ADULT - ASSESSMENT
A:   s/p R 5th ray resection - 8/19  s/p R 5th wound debridement, graft application, bone biopsy and wound vac application 9/22       P:  Patient evaluated, chart reviewed  Pt seen in ICU for worsening hypoxia and dyspnea   Pt is intubated  Dressing removed   Wound vac changed today (10/8)  Applied adaptic and santyl to the right dorsal wound - tendon exposed   Continue abx per ID recommendation  Recommend elevation of b/l legs   Continue local wound care   Continue offloading lower extremities in CAIR boots   Will change wound vac every other day   Podiatry to follow while in house  Discussed with Dr. Vazquez A:   s/p R 5th ray resection - 8/19  s/p R 5th wound debridement, graft application, bone biopsy and wound vac application 9/22       P:  Patient evaluated, chart reviewed  Pt seen in ICU for worsening hypoxia and dyspnea   Pt is intubated  Dressing removed   Wound vac changed today (10/8)  Applied adaptic and santyl to the right dorsal wound - tendon exposed   Continue abx per ID recommendation  Recommend elevation of b/l legs   Continue local wound care   Continue offloading lower extremities in CAIR boots   Will change wound vac every other day   Podiatry to follow while in house  Discussed and evaluated at bedside with attending Dr. Lara  Discussed with attending Dr. Vazquez

## 2021-10-08 NOTE — PROGRESS NOTE ADULT - SUBJECTIVE AND OBJECTIVE BOX
Patient is seen and examined at the bed side, is afebrile.  The creatinine is trending back up., requiring pressor.       REVIEW OF SYSTEMS: Unable to obtain due to mental status      ALLERGIES: No Known Allergies      ICU Vital Signs Last 24 Hrs  T(C): 36.4 (08 Oct 2021 15:00), Max: 36.6 (07 Oct 2021 23:00)  T(F): 97.5 (08 Oct 2021 15:00), Max: 97.8 (07 Oct 2021 23:00)  HR: 56 (08 Oct 2021 16:30) (52 - 67)  BP: 127/45 (08 Oct 2021 16:15) (82/57 - 139/55)  BP(mean): 66 (08 Oct 2021 16:15) (59 - 84)  ABP: --  ABP(mean): --  RR: 13 (08 Oct 2021 16:30) (11 - 25)  SpO2: 100% (08 Oct 2021 16:30) (98% - 100%)        PHYSICAL EXAM:  GENERAL: Intubated/vented  CHEST/LUNG:  Not using accessory muscles  HEART: s1 and s2 present  ABDOMEN:  Mild distended   EXTREMITIES: Right foot bandage in placed   CNS: Intubated/ vented      LABS:                        8.7    10.30 )-----------( 244      ( 08 Oct 2021 03:52 )             27.1                           8.2    6.64  )-----------( 231      ( 07 Oct 2021 04:07 )             25.7       10-08    144  |  107  |  38<H>  ----------------------------<  93  3.8   |  26  |  3.03<H>    Ca    8.5      08 Oct 2021 03:52  Phos  6.4     10-08  Mg     2.4     10-08    TPro  6.9  /  Alb  3.1<L>  /  TBili  0.6  /  DBili  x   /  AST  1057<H>  /  ALT  493<H>  /  AlkPhos  99  10-08    10-07    143  |  104  |  28<H>  ----------------------------<  98  3.6   |  27  |  2.12<H>    Ca    8.8      07 Oct 2021 04:07  Phos  4.3     10-07  Mg     2.3     10-07    TPro  6.9  /  Alb  3.2<L>  /  TBili  0.6  /  DBili  x   /  AST  26  /  ALT  17  /  AlkPhos  89  10-07    09-25    139  |  107  |  41<H>  ----------------------------<  134<H>  4.1   |  21<L>  |  4.05<H>    Ca    8.6      25 Sep 2021 06:40    TPro  6.6  /  Alb  2.3<L>  /  TBili  0.5  /  DBili  x   /  AST  25  /  ALT  15  /  AlkPhos  102  09-25        CAPILLARY BLOOD GLUCOSE  POCT Blood Glucose.: 134 mg/dL (17 Sep 2021 17:11)  POCT Blood Glucose.: 128 mg/dL (17 Sep 2021 11:06)  POCT Blood Glucose.: 157 mg/dL (17 Sep 2021 04:10)      MEDICATIONS  (STANDING):    atorvastatin 80 milliGRAM(s) Oral at bedtime  chlorhexidine 0.12% Liquid 15 milliLiter(s) Oral Mucosa every 12 hours  chlorhexidine 2% Cloths 1 Application(s) Topical <User Schedule>  cyanocobalamin 1000 MICROGram(s) Oral daily  ergocalciferol 37388 Unit(s) Oral <User Schedule>  fentaNYL   Infusion 0.5 MICROgram(s)/kG/Hr (4.35 mL/Hr) IV Continuous <Continuous>  ferrous    sulfate 325 milliGRAM(s) Oral daily  finasteride 5 milliGRAM(s) Oral daily  insulin lispro (ADMELOG) corrective regimen sliding scale   SubCutaneous Before meals and at bedtime  meropenem  IVPB 500 milliGRAM(s) IV Intermittent every 12 hours  norepinephrine Infusion 0.05 MICROgram(s)/kG/Min (4.07 mL/Hr) IV Continuous <Continuous>  pantoprazole  Injectable 40 milliGRAM(s) IV Push at bedtime  propofol Infusion 30 MICROgram(s)/kG/Min (15.6 mL/Hr) IV Continuous <Continuous>  tamsulosin 0.4 milliGRAM(s) Oral at bedtime  vancomycin  IVPB 1250 milliGRAM(s) IV Intermittent daily      RADIOLOGY & ADDITIONAL TESTS:    10/5/21 CT Chest No Cont (10.05.21 @ 14:07) Pulmonary edema with bilateral moderate to large pleural effusions. Underlying bilateral lower lobe compressive atelectasis versus pneumonia.  Mildly enlarged mediastinal lymph nodes, possibly reactive.      10/2/21: Xray Chest 1 View- PORTABLE-Routine (Xray Chest 1 View- PORTABLE-Routine in AM.) (10.02.21 @ 09:46) There is persistent bilateral perihilar/basilar diffuse airspace disease and/or RIGHT effusion.  Cardiomegaly.  No interval change.    Collected Date/Time: 9/22/2021 08:42 EDT   Received Date/Time: 9/23/2021 09:29 EDT   Surgical Pathology Report - Auth (Verified)   Specimen(s) Submitted   1 Right 5th Toe Wound Debridement r/o Osteomyelitis   Final Diagnosis   Right fifth toe; wound debridement:   - Bone with acute osteomyelitis and necrotic periosteal tissue.     9/28/21: US Abdomen Upper Quadrant Right (09.28.21 @ 12:13) Cholelithiasis without evidence of acute cholecystitis. Note is made of right pleural effusion    9/27/21: CT Abdomen and Pelvis w/ Oral Cont (09.27.21 @ 15:53) >No acute intra-abdominal pathology.    Small bilateral pleural effusions with compressive atelectasis of both lower lobes.    9/26/21: Xray Chest 1 View-PORTABLE IMMEDIATE (Xray Chest 1 View-PORTABLE IMMEDIATE .) (09.26.21 @ 15:28) : Small bilateral pleural effusions and mild pulmonary edema. A right lower lobe pneumonia is not excluded.      9/26/21: Xray Abdomen 1 View Portable, IMMEDIATE (Xray Abdomen 1 View Portable, IMMEDIATE .) (09.26.21 @ 15:28) : There is a nasogastric tube with its tip in the stomach. There is gaseous distention of the colon. No pathologic calcifications are seen. The osseous structures are intact with degenerative change is present in the spine.      9/17/21 : MR Foot No Cont, Right (09.17.21 @ 13:04) : Resection at the mid aspect of the fifth metatarsal. Lateral soft tissue wound with marrow signal abnormality throughout the fifth metatarsal and within the adjacent fourth metatarsal head which is nonspecific in the setting of recent surgery although osteomyelitis is suspected.    9/16/21 : Xray Foot AP + Lateral + Oblique, Right (09.16.21 @ 15:03) : No acute finding. No plain film evidence of osteomyelitis.        MICROBIOLOGY DATA:    Urine Microscopic-Add On (NC) (09.22.21 @ 16:14)   Red Blood Cell - Urine: 0-2 /HPF   White Blood Cell - Urine: 3-5 /HPF   Bacteria: Moderate /HPF   Comment - Urine: yeast cells present   Epithelial Cells: Moderate /HPF     Culture - Tissue with Gram Stain (09.22.21 @ 13:54)   Gram Stain: No polymorphonuclear cells seen per low power field   No organisms seen per oil power field   Specimen Source: .Tissue Right 5th toe r/o osteomyeltis   Culture Results: No growth     COVID-19 David Domain Antibody (09.18.21 @ 12:55)   COVID-19 David Domain Antibody Result: >250.00:    Culture - Blood (09.17.21 @ 10:28)   Specimen Source: .Blood Blood-Peripheral   Culture Results: No growth to date.     Culture - Blood (09.17.21 @ 10:28)   Specimen Source: .Blood Blood-Venous   Culture Results: No growth to date.     Culture - Surgical Swab (09.16.21 @ 21:42)   - Amikacin: S <=16   - Amikacin: S <=16   - Amoxicillin/Clavulanic Acid: S <=8/4   - Ampicillin: R >16 These ampicillin results predict results for amoxicillin   - Ampicillin: S <=2 Predicts results to ampicillin/sulbactam, amoxacillin-clavulanate and piperacillin-tazobactam.   - Ampicillin/Sulbactam: S 8/4 Enterobacter, Citrobacter, and Serratia may develop resistance during prolonged therapy (3-4 days)   - Ampicillin/Sulbactam: R >16/8   - Aztreonam: S <=4   - Aztreonam: S <=4   - Cefazolin: R 16 Enterobacter, Citrobacter, and Serratia may develop resistance during prolonged therapy (3-4 days)   - Cefazolin: R >16   - Cefepime: S <=2   - Cefepime: S <=2   - Cefoxitin: S <=8   - Cefoxitin: S <=8   - Ceftazidime: S <=1   - Ceftriaxone: S <=1   - Ceftriaxone: S <=1 Enterobacter, Citrobacter, and Serratia may develop resistance during prolonged therapy   - Ciprofloxacin: S <=0.25   - Ciprofloxacin: S <=0.25   - Ertapenem: S <=0.5   - Gentamicin: S <=2   - Gentamicin: S <=2   - Imipenem: S <=1   - Levofloxacin: S <=0.5   - Levofloxacin: S <=0.5   - Meropenem: S <=1   - Meropenem: S <=1   - Piperacillin/Tazobactam: S <=8   - Piperacillin/Tazobactam: S <=8   - Tetra/Doxy: S 4   - Tobramycin: S <=2   - Trimethoprim/Sulfamethoxazole: S <=0.5/9.5   - Trimethoprim/Sulfamethoxazole: S <=0.5/9.5   - Vancomycin: S 2   Specimen Source: .Surgical Swab right foot wound   Culture Results:   Culture yields >4 types of aerobic and/or anaerobic bacteria   Call client services within 7 days if further workup is clinically   indicated. Culture includes   Rare Aeromonas hydrophila/caviae   Few Klebsiella oxytoca/Raoutella ornithinolytica   Few Enterococcus faecalis   Few Streptococcus agalactiae (Group B) isolated   Group B streptococci are susceptible to ampicillin,   penicillin and cefazolin, but may be resistant to   erythromycin and clindamycin.   Recommendations for intrapartum prophylaxis for Group B   streptococci are penicillin or ampicillin.   Organism Identification: Aeromonas hydrophila/caviae   Klebsiella oxytoca /Raoutella ornithinolytica   Enterococcus faecalis   Organism: Aeromonas hydrophila/caviae   Organism: Klebsiella oxytoca /Raoutella ornithinolytica   Organism: Enterococcus faecalis   COVID-19 PCR . (09.16.21 @ 22:24)   COVID-19 PCR: NotDetec:     Patient is seen and examined at the bed side, is afebrile. He is s/p Right sided chest tube placement by CT team, tolerated the procedure well. The creatinine is trending back up., requiring pressor.       REVIEW OF SYSTEMS: Unable to obtain due to mental status      ALLERGIES: No Known Allergies      ICU Vital Signs Last 24 Hrs  T(C): 36.4 (08 Oct 2021 15:00), Max: 36.6 (07 Oct 2021 23:00)  T(F): 97.5 (08 Oct 2021 15:00), Max: 97.8 (07 Oct 2021 23:00)  HR: 56 (08 Oct 2021 16:30) (52 - 67)  BP: 127/45 (08 Oct 2021 16:15) (82/57 - 139/55)  BP(mean): 66 (08 Oct 2021 16:15) (59 - 84)  ABP: --  ABP(mean): --  RR: 13 (08 Oct 2021 16:30) (11 - 25)  SpO2: 100% (08 Oct 2021 16:30) (98% - 100%)        PHYSICAL EXAM:  GENERAL: Intubated/vented  CHEST/LUNG:  Not using accessory muscles  HEART: s1 and s2 present  ABDOMEN:  Mild distended   EXTREMITIES: Right foot bandage in placed   CNS: Intubated/ vented      LABS:                        8.7    10.30 )-----------( 244      ( 08 Oct 2021 03:52 )             27.1                           8.2    6.64  )-----------( 231      ( 07 Oct 2021 04:07 )             25.7       10-08    144  |  107  |  38<H>  ----------------------------<  93  3.8   |  26  |  3.03<H>    Ca    8.5      08 Oct 2021 03:52  Phos  6.4     10-08  Mg     2.4     10-08    TPro  6.9  /  Alb  3.1<L>  /  TBili  0.6  /  DBili  x   /  AST  1057<H>  /  ALT  493<H>  /  AlkPhos  99  10-08    10-07    143  |  104  |  28<H>  ----------------------------<  98  3.6   |  27  |  2.12<H>    Ca    8.8      07 Oct 2021 04:07  Phos  4.3     10-07  Mg     2.3     10-07    TPro  6.9  /  Alb  3.2<L>  /  TBili  0.6  /  DBili  x   /  AST  26  /  ALT  17  /  AlkPhos  89  10-07    09-25    139  |  107  |  41<H>  ----------------------------<  134<H>  4.1   |  21<L>  |  4.05<H>    Ca    8.6      25 Sep 2021 06:40    TPro  6.6  /  Alb  2.3<L>  /  TBili  0.5  /  DBili  x   /  AST  25  /  ALT  15  /  AlkPhos  102  09-25        CAPILLARY BLOOD GLUCOSE  POCT Blood Glucose.: 134 mg/dL (17 Sep 2021 17:11)  POCT Blood Glucose.: 128 mg/dL (17 Sep 2021 11:06)  POCT Blood Glucose.: 157 mg/dL (17 Sep 2021 04:10)      MEDICATIONS  (STANDING):    atorvastatin 80 milliGRAM(s) Oral at bedtime  chlorhexidine 0.12% Liquid 15 milliLiter(s) Oral Mucosa every 12 hours  chlorhexidine 2% Cloths 1 Application(s) Topical <User Schedule>  cyanocobalamin 1000 MICROGram(s) Oral daily  ergocalciferol 11301 Unit(s) Oral <User Schedule>  fentaNYL   Infusion 0.5 MICROgram(s)/kG/Hr (4.35 mL/Hr) IV Continuous <Continuous>  ferrous    sulfate 325 milliGRAM(s) Oral daily  finasteride 5 milliGRAM(s) Oral daily  insulin lispro (ADMELOG) corrective regimen sliding scale   SubCutaneous Before meals and at bedtime  meropenem  IVPB 500 milliGRAM(s) IV Intermittent every 12 hours  norepinephrine Infusion 0.05 MICROgram(s)/kG/Min (4.07 mL/Hr) IV Continuous <Continuous>  pantoprazole  Injectable 40 milliGRAM(s) IV Push at bedtime  propofol Infusion 30 MICROgram(s)/kG/Min (15.6 mL/Hr) IV Continuous <Continuous>  tamsulosin 0.4 milliGRAM(s) Oral at bedtime  vancomycin  IVPB 1250 milliGRAM(s) IV Intermittent daily      RADIOLOGY & ADDITIONAL TESTS:    10/5/21 CT Chest No Cont (10.05.21 @ 14:07) Pulmonary edema with bilateral moderate to large pleural effusions. Underlying bilateral lower lobe compressive atelectasis versus pneumonia.  Mildly enlarged mediastinal lymph nodes, possibly reactive.      10/2/21: Xray Chest 1 View- PORTABLE-Routine (Xray Chest 1 View- PORTABLE-Routine in AM.) (10.02.21 @ 09:46) There is persistent bilateral perihilar/basilar diffuse airspace disease and/or RIGHT effusion.  Cardiomegaly.  No interval change.    Collected Date/Time: 9/22/2021 08:42 EDT   Received Date/Time: 9/23/2021 09:29 EDT   Surgical Pathology Report - Auth (Verified)   Specimen(s) Submitted   1 Right 5th Toe Wound Debridement r/o Osteomyelitis   Final Diagnosis   Right fifth toe; wound debridement:   - Bone with acute osteomyelitis and necrotic periosteal tissue.     9/28/21: US Abdomen Upper Quadrant Right (09.28.21 @ 12:13) Cholelithiasis without evidence of acute cholecystitis. Note is made of right pleural effusion    9/27/21: CT Abdomen and Pelvis w/ Oral Cont (09.27.21 @ 15:53) >No acute intra-abdominal pathology.    Small bilateral pleural effusions with compressive atelectasis of both lower lobes.    9/26/21: Xray Chest 1 View-PORTABLE IMMEDIATE (Xray Chest 1 View-PORTABLE IMMEDIATE .) (09.26.21 @ 15:28) : Small bilateral pleural effusions and mild pulmonary edema. A right lower lobe pneumonia is not excluded.      9/26/21: Xray Abdomen 1 View Portable, IMMEDIATE (Xray Abdomen 1 View Portable, IMMEDIATE .) (09.26.21 @ 15:28) : There is a nasogastric tube with its tip in the stomach. There is gaseous distention of the colon. No pathologic calcifications are seen. The osseous structures are intact with degenerative change is present in the spine.      9/17/21 : MR Foot No Cont, Right (09.17.21 @ 13:04) : Resection at the mid aspect of the fifth metatarsal. Lateral soft tissue wound with marrow signal abnormality throughout the fifth metatarsal and within the adjacent fourth metatarsal head which is nonspecific in the setting of recent surgery although osteomyelitis is suspected.    9/16/21 : Xray Foot AP + Lateral + Oblique, Right (09.16.21 @ 15:03) : No acute finding. No plain film evidence of osteomyelitis.        MICROBIOLOGY DATA:    Urine Microscopic-Add On (NC) (09.22.21 @ 16:14)   Red Blood Cell - Urine: 0-2 /HPF   White Blood Cell - Urine: 3-5 /HPF   Bacteria: Moderate /HPF   Comment - Urine: yeast cells present   Epithelial Cells: Moderate /HPF     Culture - Tissue with Gram Stain (09.22.21 @ 13:54)   Gram Stain: No polymorphonuclear cells seen per low power field   No organisms seen per oil power field   Specimen Source: .Tissue Right 5th toe r/o osteomyeltis   Culture Results: No growth     COVID-19 David Domain Antibody (09.18.21 @ 12:55)   COVID-19 David Domain Antibody Result: >250.00:    Culture - Blood (09.17.21 @ 10:28)   Specimen Source: .Blood Blood-Peripheral   Culture Results: No growth to date.     Culture - Blood (09.17.21 @ 10:28)   Specimen Source: .Blood Blood-Venous   Culture Results: No growth to date.     Culture - Surgical Swab (09.16.21 @ 21:42)   - Amikacin: S <=16   - Amikacin: S <=16   - Amoxicillin/Clavulanic Acid: S <=8/4   - Ampicillin: R >16 These ampicillin results predict results for amoxicillin   - Ampicillin: S <=2 Predicts results to ampicillin/sulbactam, amoxacillin-clavulanate and piperacillin-tazobactam.   - Ampicillin/Sulbactam: S 8/4 Enterobacter, Citrobacter, and Serratia may develop resistance during prolonged therapy (3-4 days)   - Ampicillin/Sulbactam: R >16/8   - Aztreonam: S <=4   - Aztreonam: S <=4   - Cefazolin: R 16 Enterobacter, Citrobacter, and Serratia may develop resistance during prolonged therapy (3-4 days)   - Cefazolin: R >16   - Cefepime: S <=2   - Cefepime: S <=2   - Cefoxitin: S <=8   - Cefoxitin: S <=8   - Ceftazidime: S <=1   - Ceftriaxone: S <=1   - Ceftriaxone: S <=1 Enterobacter, Citrobacter, and Serratia may develop resistance during prolonged therapy   - Ciprofloxacin: S <=0.25   - Ciprofloxacin: S <=0.25   - Ertapenem: S <=0.5   - Gentamicin: S <=2   - Gentamicin: S <=2   - Imipenem: S <=1   - Levofloxacin: S <=0.5   - Levofloxacin: S <=0.5   - Meropenem: S <=1   - Meropenem: S <=1   - Piperacillin/Tazobactam: S <=8   - Piperacillin/Tazobactam: S <=8   - Tetra/Doxy: S 4   - Tobramycin: S <=2   - Trimethoprim/Sulfamethoxazole: S <=0.5/9.5   - Trimethoprim/Sulfamethoxazole: S <=0.5/9.5   - Vancomycin: S 2   Specimen Source: .Surgical Swab right foot wound   Culture Results:   Culture yields >4 types of aerobic and/or anaerobic bacteria   Call client services within 7 days if further workup is clinically   indicated. Culture includes   Rare Aeromonas hydrophila/caviae   Few Klebsiella oxytoca/Raoutella ornithinolytica   Few Enterococcus faecalis   Few Streptococcus agalactiae (Group B) isolated   Group B streptococci are susceptible to ampicillin,   penicillin and cefazolin, but may be resistant to   erythromycin and clindamycin.   Recommendations for intrapartum prophylaxis for Group B   streptococci are penicillin or ampicillin.   Organism Identification: Aeromonas hydrophila/caviae   Klebsiella oxytoca /Raoutella ornithinolytica   Enterococcus faecalis   Organism: Aeromonas hydrophila/caviae   Organism: Klebsiella oxytoca /Raoutella ornithinolytica   Organism: Enterococcus faecalis   COVID-19 PCR . (09.16.21 @ 22:24)   COVID-19 PCR: NotDetec:

## 2021-10-08 NOTE — CHART NOTE - NSCHARTNOTEFT_GEN_A_CORE
Post chest tube CXR reviewed with Attending  In good position  near resolution of Right pleural effusion    Monitor output  Cont on waterseal    Will follow

## 2021-10-08 NOTE — PROGRESS NOTE ADULT - ASSESSMENT
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  As per daughter, patient was taking tylenol which did not help his pain prompting the patient to ask his daughter to take him to the hospital. ON admission, he has no fever or Leukocytosis. The Xray of Right foot shows no Osteomyelitis but MRI of Right foot shows Lateral soft tissue wound with marrow signal abnormality throughout the fifth metatarsal which is consistent of Osteomyelitis. He has seen by Podiatry and wound culture has sent, Zosyn and Vancomycin has started. The ID consult requested to assist with further evaluation and antibiotic management.     # Right Fifth metatarsal DFU with drainage and Osteomyelitis- wound cx grew Enterococcus, Aeromonas and Klebsiella - Zosyn is the ideal to cover All organisms but kidney function is worsening, hence change to Unasyn and Levaquin until kidney function is improved - The pathology shows Bone with acute osteomyelitis and necrotic periosteal tissue.   # OREN- s/p urinary retention -s/p ibrahim catheter - s/p discontinue ACEI  # RLL pneumonia- most likely Aspiration, S/p Vomiting - On Unasyn  # Large bowel distension  # Candiduria- 9/22/21  # Pulmonary edema with bilateral moderate to large pleural effusions- on CT chest, 10/5/21- s/p intubation 10/7    would recommend:    1. Please change Abx to Meropenem and Vancomycin based on Crcl  2. Management of Vent as per ICU protocol  3. Monitor  kidney function and adjust Abx doses accordingly   4. Wound care as per Podiatry  5. Aspiration precaution    d/w ICU team ( DR. Xavier)     Attending Attestation:    Spent more than 45 minutes on total encounter, more than 50 % of the visit was spent counseling and/or coordinating care by the Attending physician.        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  As per daughter, patient was taking tylenol which did not help his pain prompting the patient to ask his daughter to take him to the hospital. ON admission, he has no fever or Leukocytosis. The Xray of Right foot shows no Osteomyelitis but MRI of Right foot shows Lateral soft tissue wound with marrow signal abnormality throughout the fifth metatarsal which is consistent of Osteomyelitis. He has seen by Podiatry and wound culture has sent, Zosyn and Vancomycin has started. The ID consult requested to assist with further evaluation and antibiotic management.     # Right Fifth metatarsal DFU with drainage and Osteomyelitis- wound cx grew Enterococcus, Aeromonas and Klebsiella - Zosyn is the ideal to cover All organisms but kidney function is worsening, hence change to Unasyn and Levaquin until kidney function is improved - The pathology shows Bone with acute osteomyelitis and necrotic periosteal tissue.   # OREN- s/p urinary retention -s/p ibrahim catheter - s/p discontinue ACEI  # RLL pneumonia- most likely Aspiration, S/p Vomiting - On Unasyn  # Large bowel distension  # Candiduria- 9/22/21  # Pulmonary edema with bilateral moderate to large pleural effusions- on CT chest, 10/5/21- s/p intubation 10/7- s/p Right sided chest tube placement by CT team, 10/8/21    would recommend:    1. Follow up pleural fluid cultures and obtain blood cultures X 2 in AM since requiring pressor  2. Please change Abx to Meropenem and Vancomycin based on Crcl  3. Management of Vent as per ICU protocol  4. Monitor  kidney function and adjust Abx doses accordingly   5. Wound care as per Podiatry  6. Aspiration precaution    d/w ICU team ( DR. Xavier and the team )    Attending Attestation:    Spent more than 45 minutes on total encounter, more than 50 % of the visit was spent counseling and/or coordinating care by the Attending physician.

## 2021-10-08 NOTE — PROGRESS NOTE ADULT - SUBJECTIVE AND OBJECTIVE BOX
C A R D I O L O G Y  **********************************    DATE OF SERVICE: 10-08-21    Events noted, intubated now for progressive respiratory distress    atorvastatin 80 milliGRAM(s) Oral at bedtime  chlorhexidine 0.12% Liquid 15 milliLiter(s) Oral Mucosa every 12 hours  chlorhexidine 2% Cloths 1 Application(s) Topical <User Schedule>  cyanocobalamin 1000 MICROGram(s) Oral daily  ergocalciferol 32795 Unit(s) Oral <User Schedule>  fentaNYL   Infusion 0.5 MICROgram(s)/kG/Hr IV Continuous <Continuous>  ferrous    sulfate 325 milliGRAM(s) Oral daily  finasteride 5 milliGRAM(s) Oral daily  furosemide   Injectable 40 milliGRAM(s) IV Push once  insulin lispro (ADMELOG) corrective regimen sliding scale   SubCutaneous Before meals and at bedtime  meropenem  IVPB 500 milliGRAM(s) IV Intermittent every 12 hours  norepinephrine Infusion 0.05 MICROgram(s)/kG/Min IV Continuous <Continuous>  ondansetron Injectable 4 milliGRAM(s) IV Push three times a day PRN  pantoprazole  Injectable 40 milliGRAM(s) IV Push at bedtime  propofol Infusion 30 MICROgram(s)/kG/Min IV Continuous <Continuous>  sodium chloride 0.9% lock flush 10 milliLiter(s) IV Push every 1 hour PRN  tamsulosin 0.4 milliGRAM(s) Oral at bedtime  vancomycin  IVPB 1250 milliGRAM(s) IV Intermittent daily                            8.7    10.30 )-----------( 244      ( 08 Oct 2021 03:52 )             27.1       Hemoglobin: 8.7 g/dL (10-08 @ 03:52)  Hemoglobin: 8.2 g/dL (10-07 @ 04:07)  Hemoglobin: 8.9 g/dL (10-06 @ 06:36)  Hemoglobin: 8.8 g/dL (10-05 @ 22:32)  Hemoglobin: 10.0 g/dL (10-05 @ 06:43)      10-08    144  |  107  |  38<H>  ----------------------------<  93  3.8   |  26  |  3.03<H>    Ca    8.5      08 Oct 2021 03:52  Phos  6.4     10-08  Mg     2.4     10-08    TPro  6.9  /  Alb  3.1<L>  /  TBili  0.6  /  DBili  x   /  AST  1057<H>  /  ALT  493<H>  /  AlkPhos  99  10-08    Creatinine Trend: 3.03<--, 2.12<--, 2.02<--, 2.11<--, 2.17<--, 2.13<--    COAGS:           T(C): 36 (10-08-21 @ 11:00), Max: 36.6 (10-07-21 @ 23:00)  HR: 55 (10-08-21 @ 11:30) (51 - 67)  BP: 124/47 (10-08-21 @ 11:30) (74/44 - 136/60)  RR: 14 (10-08-21 @ 11:30) (11 - 29)  SpO2: 100% (10-08-21 @ 11:30) (79% - 100%)  Wt(kg): --    I&O's Summary    07 Oct 2021 07:01  -  08 Oct 2021 07:00  --------------------------------------------------------  IN: 1317.3 mL / OUT: 204 mL / NET: 1113.3 mL    08 Oct 2021 07:01  -  08 Oct 2021 11:48  --------------------------------------------------------  IN: 77.7 mL / OUT: 45 mL / NET: 32.7 mL      HEENT:  (-)icterus (-)pallor  CV: N S1 S2 1/6 [place] (+)2 Pulses B/l  Resp:  [place] sounds B/L, normal effort  GI: (+) BS Soft, NT, ND  Lymph:  (-)Edema, (-)obvious lymphadenopathy  Skin: Warm to touch, Normal turgor  Psych: Unable to assess mood and affect      ASSESSMENT/PLAN: 	76y  Male PMH DM on insulin, HTN, HLD, normal lV fx moderate to severe AS 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    - monitor crt, nephrology f/u appreciated  - Abx per primary team  - Echo noted, Severe AS, EF 40-45%   - He will need a SABIHA and R+L heart cath when he recovers and has no active infection  - Cont medical management of CAD  -  Thoracic f/u for possible chest tube    Zak Wilkins MD, [facility]  [place] [phone]

## 2021-10-08 NOTE — CONSULT NOTE ADULT - CONSULT REQUESTED BY NAME
LATISHA Tse
Dr Samaniego
Dr. Xavier
Emergency Medicine
Dr. Troy
DR. AUSTEN Samaniego
Dr. Samaniego
Dr. Troy
Dr. Xavier
Dr. Troy
Dr. Xavier
Dr. Samaniego
dr. alexander

## 2021-10-08 NOTE — PROGRESS NOTE ADULT - SUBJECTIVE AND OBJECTIVE BOX
BRIEF HOSPITAL COURSE  Patient is a 76y old  Male who presents with a chief complaint of R Foot Pain (05 Oct 2021 17:10)      HPI:  Patient is a 76 year old male  with PMH of poorly controlled IDDM Hgb A1C 11.4, HTN, HLD, stage III CKD, CAD s/p CABG and recent partial amputation to right 5th digit (8/19/21) who presented to ED with c/o right foot pain associated with discharge and foul odor. No post op follow up. Came to the ED due to sub obtimal pain control. No fever, chest, pain, palpitations, nausea, vomiting, diarrhea (17 Sep 2021 01:11)    INTERVAL HPI/ HOSPITAL COURSE   MRI confirms suspected osteomyelitis of the R foot - patient followed by podiatry and ID; and underwent debridement and wound VAC placement on R foot. Surgical pathology resulted OM, and wound culture resulting Enterococcus Aeromonal and Klebsiella- treated initially with Zosyn. He developed OREN likely mixed 2/2 to a mixed etiology with prerenal from active infection/ pulmonary edema, intrarenal due to toxicity from IV antibiotics and post renal from urinary retention, Nephrology consulted for optimization of kidney function. IV antibiotic regimen switched to Unasyn and Levaquin, sensitive for cultured organisms; requiring 6 weeks of IV antibiotics.     Patient then developed Pulmonary Edema and was treated with IV lasix. Cardiology consulted and recommending SABIHA with L/R heart cath once infection clears and medically stable. Hospital course further complicated by abdominal distention and constipation for which he received lactulose and had vomiting. AXR shows distended loops of bowel for which NG tube was placed which the patient removed overnight 9/26 - re-attempts to replace NG tube were unsuccessful as patient was non-cooperative.     The patient went on to develop worsening respiratory distress sats 80s% SPO2 on 10L NC : CXR showed possible RLL PNA. likely aspiration.CT abdomen deferred for now as patient is unstable and will not tolerate lying flat. CT chest done showing R>L pulmonary edema vs Pneumonia;. Pulm (DR. Xavier) consulted for possible thoracentesis, and recommended to start albumin + lasix. ICU was consulted for worsening respiratory status. Surgery consulted for pigtail placement, however, inadequate visualization of pocket.  Patient denies any current trouble breathing, chest pain, or cough.   INTERVAL HPI/OVERNIGHT EVENTS:     PRESSORS: [X ] YES [ ] NO  WHICH:    ANTIBIOTICS:                  DATE STARTED:  ANTIBIOTICS:                  DATE STARTED:  ANTIBIOTICS:                  DATE STARTED:    Antimicrobial:  ampicillin/sulbactam  IVPB 3 Gram(s) IV Intermittent every 6 hours  fluconAZOLE IVPB      fluconAZOLE IVPB 100 milliGRAM(s) IV Intermittent every 24 hours  levoFLOXacin  Tablet 250 milliGRAM(s) Oral every 24 hours    Cardiovascular:  metoprolol succinate  milliGRAM(s) Oral daily  phenylephrine    Infusion 1 MICROgram(s)/kG/Min IV Continuous <Continuous>  tamsulosin 0.4 milliGRAM(s) Oral at bedtime    Pulmonary:    Hematalogic:  aspirin  chewable 81 milliGRAM(s) Oral daily  heparin   Injectable 5000 Unit(s) SubCutaneous every 8 hours    Other:  atorvastatin 80 milliGRAM(s) Oral at bedtime  chlorhexidine 0.12% Liquid 15 milliLiter(s) Oral Mucosa every 12 hours  chlorhexidine 2% Cloths 1 Application(s) Topical <User Schedule>  cyanocobalamin 1000 MICROGram(s) Oral daily  ergocalciferol 65908 Unit(s) Oral <User Schedule>  ferrous    sulfate 325 milliGRAM(s) Oral daily  finasteride 5 milliGRAM(s) Oral daily  insulin lispro (ADMELOG) corrective regimen sliding scale   SubCutaneous Before meals and at bedtime  ondansetron Injectable 4 milliGRAM(s) IV Push three times a day PRN  pantoprazole  Injectable 40 milliGRAM(s) IV Push at bedtime  polyethylene glycol 3350 17 Gram(s) Oral daily  propofol Infusion 30 MICROgram(s)/kG/Min IV Continuous <Continuous>  senna 2 Tablet(s) Oral at bedtime  sodium chloride 0.9% lock flush 10 milliLiter(s) IV Push every 1 hour PRN      Drug Dosing Weight  Height (cm): 170.2 (17 Sep 2021 21:58)  Weight (kg): 86.9 (05 Oct 2021 21:45)  BMI (kg/m2): 30 (05 Oct 2021 21:45)  BSA (m2): 1.99 (05 Oct 2021 21:45)    CENTRAL LINE: [ ] YES [ ] NO  LOCATION:   DATE INSERTED:  REMOVE: [ ] YES [ ] NO  EXPLAIN:    CASTAÑEDA: [ ] YES [ ] NO    DATE INSERTED:  REMOVE:  [ ] YES [ ] NO  EXPLAIN:    A-LINE:  [ ] YES [ ] NO  LOCATION:   DATE INSERTED:  REMOVE:  [ ] YES [ ] NO  EXPLAIN:    PMH/Social Hx/Fam Hx -reviewed admission note, no change since admission  PAST MEDICAL & SURGICAL HISTORY:  HTN (hypertension)    HLD (hyperlipidemia)    DM (diabetes mellitus)    CAD (coronary artery disease)    Glaucoma, angle-closure    [place] (hard of hearing)    S/P CABG (coronary artery bypass graft)    History of thyroid surgery      Heart faliure: acute [ ] chronic [ ] acute or chronic [ ] diastolic [ ] systolic [ ] combied systolic and diastolic[ ]  OREN: ATN[ ] renal medullary necrosis [ ] CKD I [ ]CKDII [ ]CKD III [ ]CKD IV [ ]CKD V [ ]Other pathological lesions [ ]  Abdominal Nutrition Status: malnutrition [ ] cachexia [ ] morbid obesity/BMI=40 [ ] Supplement ordered [___________]     T(C): 36.4 (10-08-21 @ 04:00), Max: 36.6 (10-07-21 @ 23:00)  HR: 56 (10-08-21 @ 07:45)  BP: 131/58 (10-08-21 @ 07:30)  BP(mean): 76 (10-08-21 @ 07:30)  ABP: --  ABP(mean): --  RR: 14 (10-08-21 @ 07:45)  SpO2: 100% (10-08-21 @ 07:45)  Wt(kg): --    ABG - ( 08 Oct 2021 03:26 )  pH, Arterial: 7.41  pH, Blood: x     /  pCO2: 38    /  pO2: 96    / HCO3: 24    / Base Excess: -0.3  /  SaO2: 98                    10-07 @ 07:01  -  10-08 @ 07:00  --------------------------------------------------------  IN: 1317.3 mL / OUT: 204 mL / NET: 1113.3 mL        Mode: AC/ CMV (Assist Control/ Continuous Mandatory Ventilation)  RR (machine): 14  TV (machine): 450  FiO2: 100  PEEP: 5  ITime: 0.9  MAP: 8  PIP: 19      PHYSICAL EXAM:    GENERAL: [ ]NAD, [ ]well-groomed, [ ]well-developed  HEAD:  [ ]Atraumatic, [ ]Normocephalic  EYES: [ ]EOMI, [ ]PERRLA, [ ]conjunctiva and sclera clear  ENMT: [ ]No tonsillar erythema, exudates, or enlargement; [ ]Moist mucous membranes, [ ]Good dentition, [ ]No lesions  NECK: [ ]Supple, normal appearance, [ ]No JVD; [ ]Normal thyroid; [ ]Trachea midline  NERVOUS SYSTEM:  [ ]Alert & Oriented X3, [ ]Good concentration; [ ]Motor Strength 5/5 B/L upper and lower extremities; [ ]DTRs 2+ intact and symmetric  CHEST/LUNG: [ ]No chest deformity; [ ]Normal percussion bilaterally; [ ]No rales, rhonchi, wheezing; [ ]Crackles at bases  HEART: [ ]Regular rate and rhythm; [ ]No murmurs, rubs, or gallops  ABDOMEN: [ ]Soft, Nontender, Nondistended; [ ]Bowel sounds present  EXTREMITIES:  [ ]2+ Peripheral Pulses, [ ]No clubbing, cyanosis, or edema [ ]Bilat lower extremity edema  LYMPH: [ ]No lymphadenopathy noted  SKIN: [ ]No rashes or lesions; [ ]Good capillary refill      LABS:  CBC Full  -  ( 08 Oct 2021 03:52 )  WBC Count : 10.30 K/uL  RBC Count : 2.94 M/uL  Hemoglobin : 8.7 g/dL  Hematocrit : 27.1 %  Platelet Count - Automated : 244 K/uL  Mean Cell Volume : 92.2 fl  Mean Cell Hemoglobin : 29.6 pg  Mean Cell Hemoglobin Concentration : 32.1 gm/dL  Auto Neutrophil # : 8.62 K/uL  Auto Lymphocyte # : 1.12 K/uL  Auto Monocyte # : 0.42 K/uL  Auto Eosinophil # : 0.00 K/uL  Auto Basophil # : 0.05 K/uL  Auto Neutrophil % : 83.6 %  Auto Lymphocyte % : 10.9 %  Auto Monocyte % : 4.1 %  Auto Eosinophil % : 0.0 %  Auto Basophil % : 0.5 %    10-08    144  |  107  |  38<H>  ----------------------------<  93  3.8   |  26  |  3.03<H>    Ca    8.5      08 Oct 2021 03:52  Phos  6.4     10-08  Mg     2.4     10-08    TPro  6.9  /  Alb  3.1<L>  /  TBili  0.6  /  DBili  x   /  AST  1057<H>  /  ALT  493<H>  /  AlkPhos  99  10-08    PT/INR - ( 07 Oct 2021 04:07 )   PT: 16.6 sec;   INR: 1.42 ratio                 RADIOLOGY & ADDITIONAL STUDIES REVIEWED:      [ ]GOALS OF CARE DISCUSSION WITH PATIENT/FAMILY/PROXY:    CRITICAL CARE TIME SPENT: 35 minutes BRIEF HOSPITAL COURSE  Patient is a 76y old  Male who presents with a chief complaint of R Foot Pain (05 Oct 2021 17:10)      HPI:  Patient is a 76 year old male  with PMH of poorly controlled IDDM Hgb A1C 11.4, HTN, HLD, stage III CKD, CAD s/p CABG and recent partial amputation to right 5th digit (8/19/21) who presented to ED with c/o right foot pain associated with discharge and foul odor. No post op follow up. Came to the ED due to sub obtimal pain control. No fever, chest, pain, palpitations, nausea, vomiting, diarrhea (17 Sep 2021 01:11)    INTERVAL HPI/ HOSPITAL COURSE   MRI confirms suspected osteomyelitis of the R foot - patient followed by podiatry and ID; and underwent debridement and wound VAC placement on R foot. Surgical pathology resulted OM, and wound culture resulting Enterococcus Aeromonal and Klebsiella- treated initially with Zosyn. He developed OREN likely mixed 2/2 to a mixed etiology with prerenal from active infection/ pulmonary edema, intrarenal due to toxicity from IV antibiotics and post renal from urinary retention, Nephrology consulted for optimization of kidney function. IV antibiotic regimen switched to Unasyn and Levaquin, sensitive for cultured organisms; requiring 6 weeks of IV antibiotics.     Patient then developed Pulmonary Edema and was treated with IV lasix. Cardiology consulted and recommending SABIHA with L/R heart cath once infection clears and medically stable. Hospital course further complicated by abdominal distention and constipation for which he received lactulose and had vomiting. AXR shows distended loops of bowel for which NG tube was placed which the patient removed overnight 9/26 - re-attempts to replace NG tube were unsuccessful as patient was non-cooperative.     The patient went on to develop worsening respiratory distress sats 80s% SPO2 on 10L NC : CXR showed possible RLL PNA. likely aspiration.CT abdomen deferred for now as patient is unstable and will not tolerate lying flat. CT chest done showing R>L pulmonary edema vs Pneumonia;. Pulm (DR. Xavier) consulted for possible thoracentesis, and recommended to start albumin + lasix. ICU was consulted for worsening respiratory status. Surgery consulted for pigtail placement, however, inadequate visualization of pocket.  Patient denies any current trouble breathing, chest pain, or cough.     INTERVAL HPI/OVERNIGHT EVENTS:   No problems overnight    PRESSORS: [X ] YES [ ] NO  WHICH:    ANTIBIOTICS:          unasyn        DATE STARTED:  9/19/2021  ANTIBIOTICS:          levaquin      DATE STARTED:   9/19/2021  Fluconazole 9/23/2021      Antimicrobial:  ampicillin/sulbactam  IVPB 3 Gram(s) IV Intermittent every 6 hours  fluconAZOLE IVPB      fluconAZOLE IVPB 100 milliGRAM(s) IV Intermittent every 24 hours  levoFLOXacin  Tablet 250 milliGRAM(s) Oral every 24 hours    Cardiovascular:  metoprolol succinate  milliGRAM(s) Oral daily  phenylephrine    Infusion 1 MICROgram(s)/kG/Min IV Continuous <Continuous>  tamsulosin 0.4 milliGRAM(s) Oral at bedtime    Pulmonary:    Hematalogic:  aspirin  chewable 81 milliGRAM(s) Oral daily  heparin   Injectable 5000 Unit(s) SubCutaneous every 8 hours    Other:  atorvastatin 80 milliGRAM(s) Oral at bedtime  chlorhexidine 0.12% Liquid 15 milliLiter(s) Oral Mucosa every 12 hours  chlorhexidine 2% Cloths 1 Application(s) Topical <User Schedule>  cyanocobalamin 1000 MICROGram(s) Oral daily  ergocalciferol 60033 Unit(s) Oral <User Schedule>  ferrous    sulfate 325 milliGRAM(s) Oral daily  finasteride 5 milliGRAM(s) Oral daily  insulin lispro (ADMELOG) corrective regimen sliding scale   SubCutaneous Before meals and at bedtime  ondansetron Injectable 4 milliGRAM(s) IV Push three times a day PRN  pantoprazole  Injectable 40 milliGRAM(s) IV Push at bedtime  polyethylene glycol 3350 17 Gram(s) Oral daily  propofol Infusion 30 MICROgram(s)/kG/Min IV Continuous <Continuous>  senna 2 Tablet(s) Oral at bedtime  sodium chloride 0.9% lock flush 10 milliLiter(s) IV Push every 1 hour PRN      Drug Dosing Weight  Height (cm): 170.2 (17 Sep 2021 21:58)  Weight (kg): 86.9 (05 Oct 2021 21:45)  BMI (kg/m2): 30 (05 Oct 2021 21:45)  BSA (m2): 1.99 (05 Oct 2021 21:45)    CENTRAL LINE: [X ] YES  LOCATION:   left femoral DATE INSERTED: 10/7/2021      CASTAÑEDA: [X ] YES    DATE INSERTED: 10/4/2021      A-LINE:  [X ] NO     PMH/Social Hx/Fam Hx -reviewed admission note, no change since admission    PAST MEDICAL & SURGICAL HISTORY:  HTN (hypertension)    HLD (hyperlipidemia)    DM (diabetes mellitus)    CAD (coronary artery disease)    Glaucoma, angle-closure    [place] (hard of hearing)    S/P CABG (coronary artery bypass graft)    History of thyroid surgery      Heart faliure: acute [ ] chronic [ ] acute or chronic [ ] diastolic [ ] systolic [ ] combied systolic and diastolic[ ]  OREN: ATN[ ] renal medullary necrosis [ ] CKD I [ ]CKDII [ ]CKD III [ ]CKD IV [ ]CKD V [ ]Other pathological lesions [ ]  Abdominal Nutrition Status: malnutrition [ ] cachexia [ ] morbid obesity/BMI=40 [ ] Supplement ordered [___________]     T(C): 36.4 (10-08-21 @ 04:00), Max: 36.6 (10-07-21 @ 23:00)  HR: 56 (10-08-21 @ 07:45)  BP: 131/58 (10-08-21 @ 07:30)  BP(mean): 76 (10-08-21 @ 07:30)  ABP: --  ABP(mean): --  RR: 14 (10-08-21 @ 07:45)  SpO2: 100% (10-08-21 @ 07:45)  Wt(kg): --    ABG - ( 08 Oct 2021 03:26 )  pH, Arterial: 7.41  pH, Blood: x     /  pCO2: 38    /  pO2: 96    / HCO3: 24    / Base Excess: -0.3  /  SaO2: 98                    10-07 @ 07:01  -  10-08 @ 07:00  --------------------------------------------------------  IN: 1317.3 mL / OUT: 204 mL / NET: 1113.3 mL        Mode: AC/ CMV (Assist Control/ Continuous Mandatory Ventilation)  RR (machine): 14  TV (machine): 450  FiO2: 100  PEEP: 5  ITime: 0.9  MAP: 8  PIP: 19    PHYSICAL EXAM    GENERAL: NAD, lying in bed comfortably  HEAD:  Atraumatic, Normocephalic  EYES: EOMI, PERRLA, conjunctiva and sclera clear  ENT: Moist mucous membranes  NECK: Supple, No JVD  CHEST/LUNG: Clear to auscultation bilaterally; No rales, rhonchi, wheezing, or rubs. Unlabored respirations  HEART: Regular rate and rhythm; GII holosystolic plataeu, at 5th ICS MCL, GI systolic crescendo decrescendo at RUSB, no rubs, or gallopsABDOMEN: Bowel sounds present; Soft, Nontender, Nondistended. No hepatomegally  EXTREMITIES:  2+ Peripheral Pulses, brisk capillary refill. No clubbing, cyanosis, or edema  NERVOUS SYSTEM: sedated  SKIN: No rashes or lesions      LABS:  CBC Full  -  ( 08 Oct 2021 03:52 )  WBC Count : 10.30 K/uL  RBC Count : 2.94 M/uL  Hemoglobin : 8.7 g/dL  Hematocrit : 27.1 %  Platelet Count - Automated : 244 K/uL  Mean Cell Volume : 92.2 fl  Mean Cell Hemoglobin : 29.6 pg  Mean Cell Hemoglobin Concentration : 32.1 gm/dL  Auto Neutrophil # : 8.62 K/uL  Auto Lymphocyte # : 1.12 K/uL  Auto Monocyte # : 0.42 K/uL  Auto Eosinophil # : 0.00 K/uL  Auto Basophil # : 0.05 K/uL  Auto Neutrophil % : 83.6 %  Auto Lymphocyte % : 10.9 %  Auto Monocyte % : 4.1 %  Auto Eosinophil % : 0.0 %  Auto Basophil % : 0.5 %    10-08    144  |  107  |  38<H>  ----------------------------<  93  3.8   |  26  |  3.03<H>    Ca    8.5      08 Oct 2021 03:52  Phos  6.4     10-08  Mg     2.4     10-08    TPro  6.9  /  Alb  3.1<L>  /  TBili  0.6  /  DBili  x   /  AST  1057<H>  /  ALT  493<H>  /  AlkPhos  99  10-08    PT/INR - ( 07 Oct 2021 04:07 )   PT: 16.6 sec;   INR: 1.42 ratio                 RADIOLOGY & ADDITIONAL STUDIES REVIEWED:      [X ]GOALS OF CARE DISCUSSION WITH PATIENT/FAMILY/PROXY:    CRITICAL CARE TIME SPENT: 35 minutes BRIEF HOSPITAL COURSE  Patient is a 76y old  Male who presents with a chief complaint of R Foot Pain (05 Oct 2021 17:10)      HPI:  Patient is a 76 year old male  with PMH of poorly controlled IDDM Hgb A1C 11.4, HTN, HLD, stage III CKD, CAD s/p CABG and recent partial amputation to right 5th digit (8/19/21) who presented to ED with c/o right foot pain associated with discharge and foul odor. No post op follow up. Came to the ED due to sub obtimal pain control. No fever, chest, pain, palpitations, nausea, vomiting, diarrhea (17 Sep 2021 01:11)    INTERVAL HPI/ HOSPITAL COURSE   MRI confirms suspected osteomyelitis of the R foot - patient followed by podiatry and ID; and underwent debridement and wound VAC placement on R foot. Surgical pathology resulted OM, and wound culture resulting Enterococcus Aeromonal and Klebsiella- treated initially with Zosyn. He developed OREN likely mixed 2/2 to a mixed etiology with prerenal from active infection/ pulmonary edema, intrarenal due to toxicity from IV antibiotics and post renal from urinary retention, Nephrology consulted for optimization of kidney function. IV antibiotic regimen switched to Unasyn and Levaquin, sensitive for cultured organisms; requiring 6 weeks of IV antibiotics.     Patient then developed Pulmonary Edema and was treated with IV lasix. Cardiology consulted and recommending SABIHA with L/R heart cath once infection clears and medically stable. Hospital course further complicated by abdominal distention and constipation for which he received lactulose and had vomiting. AXR shows distended loops of bowel for which NG tube was placed which the patient removed overnight 9/26 - re-attempts to replace NG tube were unsuccessful as patient was non-cooperative.     The patient went on to develop worsening respiratory distress sats 80s% SPO2 on 10L NC : CXR showed possible RLL PNA. likely aspiration.CT abdomen deferred for now as patient is unstable and will not tolerate lying flat. CT chest done showing R>L pulmonary edema vs Pneumonia;. Pulm (DR. Xavier) consulted for possible thoracentesis, and recommended to start albumin + lasix. ICU was consulted for worsening respiratory status. Surgery consulted for pigtail placement, however, inadequate visualization of pocket.  Patient denies any current trouble breathing, chest pain, or cough.     INTERVAL HPI/OVERNIGHT EVENTS:   No problems overnight    PRESSORS: [X ] YES [ ] NO  WHICH:    ANTIBIOTICS:          unasyn        DATE STARTED:  9/19/2021  ANTIBIOTICS:          levaquin      DATE STARTED:   9/19/2021  Fluconazole 9/23/2021      Antimicrobial:  ampicillin/sulbactam  IVPB 3 Gram(s) IV Intermittent every 6 hours  fluconAZOLE IVPB      fluconAZOLE IVPB 100 milliGRAM(s) IV Intermittent every 24 hours  levoFLOXacin  Tablet 250 milliGRAM(s) Oral every 24 hours    Cardiovascular:  metoprolol succinate  milliGRAM(s) Oral daily  phenylephrine    Infusion 1 MICROgram(s)/kG/Min IV Continuous <Continuous>  tamsulosin 0.4 milliGRAM(s) Oral at bedtime    Pulmonary:    Hematalogic:  aspirin  chewable 81 milliGRAM(s) Oral daily  heparin   Injectable 5000 Unit(s) SubCutaneous every 8 hours    Other:  atorvastatin 80 milliGRAM(s) Oral at bedtime  chlorhexidine 0.12% Liquid 15 milliLiter(s) Oral Mucosa every 12 hours  chlorhexidine 2% Cloths 1 Application(s) Topical <User Schedule>  cyanocobalamin 1000 MICROGram(s) Oral daily  ergocalciferol 48521 Unit(s) Oral <User Schedule>  ferrous    sulfate 325 milliGRAM(s) Oral daily  finasteride 5 milliGRAM(s) Oral daily  insulin lispro (ADMELOG) corrective regimen sliding scale   SubCutaneous Before meals and at bedtime  ondansetron Injectable 4 milliGRAM(s) IV Push three times a day PRN  pantoprazole  Injectable 40 milliGRAM(s) IV Push at bedtime  polyethylene glycol 3350 17 Gram(s) Oral daily  propofol Infusion 30 MICROgram(s)/kG/Min IV Continuous <Continuous>  senna 2 Tablet(s) Oral at bedtime  sodium chloride 0.9% lock flush 10 milliLiter(s) IV Push every 1 hour PRN      Drug Dosing Weight  Height (cm): 170.2 (17 Sep 2021 21:58)  Weight (kg): 86.9 (05 Oct 2021 21:45)  BMI (kg/m2): 30 (05 Oct 2021 21:45)  BSA (m2): 1.99 (05 Oct 2021 21:45)    CENTRAL LINE: [X ] YES  LOCATION:   left femoral DATE INSERTED: 10/7/2021      CASTAÑEDA: [X ] YES    DATE INSERTED: 10/4/2021      A-LINE:  [X ] NO     PMH/Social Hx/Fam Hx -reviewed admission note, no change since admission    PAST MEDICAL & SURGICAL HISTORY:  HTN (hypertension)    HLD (hyperlipidemia)    DM (diabetes mellitus)    CAD (coronary artery disease)    Glaucoma, angle-closure    [place] (hard of hearing)    S/P CABG (coronary artery bypass graft)    History of thyroid surgery      Heart faliure: acute [ ] chronic [ ] acute or chronic [ ] diastolic [ ] systolic [ ] combied systolic and diastolic[ ]  OREN: ATN[ ] renal medullary necrosis [ ] CKD I [ ]CKDII [ ]CKD III [ ]CKD IV [ ]CKD V [ ]Other pathological lesions [ ]  Abdominal Nutrition Status: malnutrition [ ] cachexia [ ] morbid obesity/BMI=40 [ ] Supplement ordered [___________]     T(C): 36.4 (10-08-21 @ 04:00), Max: 36.6 (10-07-21 @ 23:00)  HR: 56 (10-08-21 @ 07:45)  BP: 131/58 (10-08-21 @ 07:30)  BP(mean): 76 (10-08-21 @ 07:30)  ABP: --  ABP(mean): --  RR: 14 (10-08-21 @ 07:45)  SpO2: 100% (10-08-21 @ 07:45)  Wt(kg): --    ABG - ( 08 Oct 2021 03:26 )  pH, Arterial: 7.41  pH, Blood: x     /  pCO2: 38    /  pO2: 96    / HCO3: 24    / Base Excess: -0.3  /  SaO2: 98                    10-07 @ 07:01  -  10-08 @ 07:00  --------------------------------------------------------  IN: 1317.3 mL / OUT: 204 mL / NET: 1113.3 mL        Mode: AC/ CMV (Assist Control/ Continuous Mandatory Ventilation)  RR (machine): 14  TV (machine): 450  FiO2: 100  PEEP: 5  ITime: 0.9  MAP: 8  PIP: 19    PHYSICAL EXAM    GENERAL: NAD, lying in bed comfortably + ETT  HEAD:  Atraumatic, Normocephalic  EYES: EOMI, PERRLA, conjunctiva and sclera clear  ENT: Moist mucous membranes  NECK: Supple, No JVD  CHEST/LUNG: Clear to auscultation bilaterally; No rales, rhonchi, wheezing, or rubs. Unlabored respirations  HEART: Regular rate and rhythm; GII holosystolic plataeu, at 5th ICS MCL, GI systolic crescendo decrescendo at RUSB, no rubs, or gallopsABDOMEN: Bowel sounds present; Soft, Nontender, Nondistended. No hepatomegally  ABD: soft   EXTREMITIES:  2+ Peripheral Pulses, brisk capillary refill. No clubbing, cyanosis, or edema  NERVOUS SYSTEM: sedated  SKIN: No rashes or lesions      LABS:  CBC Full  -  ( 08 Oct 2021 03:52 )  WBC Count : 10.30 K/uL  RBC Count : 2.94 M/uL  Hemoglobin : 8.7 g/dL  Hematocrit : 27.1 %  Platelet Count - Automated : 244 K/uL  Mean Cell Volume : 92.2 fl  Mean Cell Hemoglobin : 29.6 pg  Mean Cell Hemoglobin Concentration : 32.1 gm/dL  Auto Neutrophil # : 8.62 K/uL  Auto Lymphocyte # : 1.12 K/uL  Auto Monocyte # : 0.42 K/uL  Auto Eosinophil # : 0.00 K/uL  Auto Basophil # : 0.05 K/uL  Auto Neutrophil % : 83.6 %  Auto Lymphocyte % : 10.9 %  Auto Monocyte % : 4.1 %  Auto Eosinophil % : 0.0 %  Auto Basophil % : 0.5 %    10-08    144  |  107  |  38<H>  ----------------------------<  93  3.8   |  26  |  3.03<H>    Ca    8.5      08 Oct 2021 03:52  Phos  6.4     10-08  Mg     2.4     10-08    TPro  6.9  /  Alb  3.1<L>  /  TBili  0.6  /  DBili  x   /  AST  1057<H>  /  ALT  493<H>  /  AlkPhos  99  10-08    PT/INR - ( 07 Oct 2021 04:07 )   PT: 16.6 sec;   INR: 1.42 ratio                 RADIOLOGY & ADDITIONAL STUDIES REVIEWED:    CXR:< from: Xray Chest 1 View-PORTABLE IMMEDIATE (Xray Chest 1 View-PORTABLE IMMEDIATE .) (10.07.21 @ 14:24) >    EXAM:  XR CHEST PORTABLE ROUTINE 1V                          EXAM:  XR CHEST PORTABLE IMMED 1V                            PROCEDURE DATE:  10/07/2021          INTERPRETATION:  AP erect chest on October 7, 2021 at 8:50 AM.    Heart enlargement, sternotomy, and advanced congestive picture with basilar effusions noted. Congestion is increased compared to October 6.    Follow-up AP chest, erect, and October 7, 2021 at 2:20 PM.    On this examination congestion has improved.    IMPRESSION: Initiallythere was worsening of congestion with the latest film shows improvement with mild to moderate residual.    --- End of Report ---            JASMYNE ARANA MD; Attending Radiologist  This document has been electronically signed. Oct  7 2021  2:37PM    < end of copied text >    [X ]GOALS OF CARE DISCUSSION WITH PATIENT/FAMILY/PROXY:    CRITICAL CARE TIME SPENT: 35 minutes

## 2021-10-08 NOTE — PROGRESS NOTE ADULT - ATTENDING COMMENTS
IMP: This is a  76 yr old man from home, lives with daughter with DM- uncontrol  on insulin, HTN, HLD, CAD, PSH R partial 5th ray amputation 8/19/2021 p/w R foot pain x1 day associated with foul smelling discharge due to osteomyelitis beig treated with iv antibx .  Pulmonary evaluation requested for hypoxia  with pleural effusion         Assessment:  - Acute Hypoxic Respiratory Failure  - Pulmonary Edema  - Pleural Effusion   - Possible  Aspiration PNA   - Osteomyelitis of right foot   - OREN superimposed on CKD  - Severe AS   - CAD s/p CABG   - Cholelithiasis   - HTN  - HLD  - DM uncontrol   - Diabetic foot ulcer      Plan   -intubated 10/7 after failing trial of BiPaP  -right pig chest tube 10/8.. drained 1000 ml   -Septic shock   -titrate off vasopressors  to maintain MAP>65  -midodrine   -will need BiPaP vs intubation   -thoracic surg unable to place chest tube   -hemodynamic monitoring   -LFT trending up  (was  on levaquin and Unasyn)  -continue antibx changed to Meropenem and vanco iv   -monitor blood sugar with coverage   -advance directives  -DVT/ GI prphy .

## 2021-10-08 NOTE — CHART NOTE - NSCHARTNOTEFT_GEN_A_CORE
Assessment:   Patient is a 76y old  Male who presents with a chief complaint of R Foot Pain (08 Oct 2021 16:40).Pt intubated. Discussed with PGY 1. TF order (note A1c 11.4 on 8/15). Pt noted on Propofol (decreasing, last 5.2 ml/hr)        Diet Prescription: Diet, NPO with Tube Feed:   Tube Feeding Modality: Nasogastric  Glucerna 1.5 [place]  Total Volume for 24 Hours (mL): 960  Continuous  Starting Tube Feed Rate {mL per Hour}: 5  Increase Tube Feed Rate by (mL): 5     Every 6 hours  Until Goal Tube Feed Rate (mL per Hour): 40  Tube Feed Duration (in Hours): 24  Tube Feed Start Time: 18:00 (10-08-21 @ 17:55)  Diet, NPO after Midnight:      NPO Start Date: 06-Oct-2021,   NPO Start Time: 23:59 (10-06-21 @ 18:26)  Diet, NPO after Midnight:      NPO Start Date: 06-Oct-2021,   NPO Start Time: 23:59 (10-06-21 @ 16:39)        Daily     Daily Weight in k.7 (08 Oct 2021 07:15)  Weight in k.5 (07 Oct 2021 07:00)  Weight in k.1 (06 Oct 2021 07:00)  Weight in k.2 (30 Sep 2021 04:54)  Weight in k.3 (28 Sep 2021 04:57)      Pertinent Medications: MEDICATIONS  (STANDING):  atorvastatin 80 milliGRAM(s) Oral at bedtime  chlorhexidine 0.12% Liquid 15 milliLiter(s) Oral Mucosa every 12 hours  chlorhexidine 2% Cloths 1 Application(s) Topical <User Schedule>  cyanocobalamin 1000 MICROGram(s) Oral daily  ergocalciferol 49582 Unit(s) Oral <User Schedule>  fentaNYL   Infusion 0.5 MICROgram(s)/kG/Hr (4.35 mL/Hr) IV Continuous <Continuous>  ferrous    sulfate 325 milliGRAM(s) Oral daily  finasteride 5 milliGRAM(s) Oral daily  insulin lispro (ADMELOG) corrective regimen sliding scale   SubCutaneous Before meals and at bedtime  meropenem  IVPB 500 milliGRAM(s) IV Intermittent every 12 hours  norepinephrine Infusion 0.05 MICROgram(s)/kG/Min (4.07 mL/Hr) IV Continuous <Continuous>  pantoprazole  Injectable 40 milliGRAM(s) IV Push at bedtime  propofol Infusion 30 MICROgram(s)/kG/Min (15.6 mL/Hr) IV Continuous <Continuous>  tamsulosin 0.4 milliGRAM(s) Oral at bedtime  vancomycin  IVPB 1250 milliGRAM(s) IV Intermittent daily    MEDICATIONS  (PRN):  ondansetron Injectable 4 milliGRAM(s) IV Push three times a day PRN Nausea and/or Vomiting  sodium chloride 0.9% lock flush 10 milliLiter(s) IV Push every 1 hour PRN Pre/post blood products, medications, blood draw, and to maintain line patency    Pertinent Labs: 10-08 Na144 mmol/L Glu 93 mg/dL K+ 3.8 mmol/L Cr  3.03 mg/dL<H> BUN 38 mg/dL<H> 10-08 Phos 6.4 mg/dL<H> 10-08 Alb 3.1 g/dL<L>     CAPILLARY BLOOD GLUCOSE      POCT Blood Glucose.: 99 mg/dL (08 Oct 2021 17:24)  POCT Blood Glucose.: 98 mg/dL (08 Oct 2021 11:05)  POCT Blood Glucose.: 121 mg/dL (08 Oct 2021 05:38)  POCT Blood Glucose.: 130 mg/dL (07 Oct 2021 22:01)        Estimated Needs:   [ ] no change since previous assessment  [ ] recalculated:       Previous Nutrition Diagnosis:   [ ] Altered GI function  [x ]Inadequate Oral Intake [ ] Swallowing Difficulty   [ ] Altered nutrition related labs [ ] Increased Nutrient Needs [ ] Overweight/Obesity   [ ] Unintended Weight Loss [ ] Food & Nutrition Related Knowledge Deficit [ ] Malnutrition   [ ] Other:     Nutrition Diagnosis is [x ] ongoing  (now with TF ordered).  [ ] resolved [ ] not applicable        Interventions:   Recommend  [ ] Change Diet To:  [ ] Nutrition Supplement  [x ] Nutrition Support: Glucerna 1.5 45x24 (initial goal): 1080 ml, 1620 kcals, 89 gm protein).Note: if increasing Propofol, decrease TF. MD to monitor. RD available.   [x ] Other: Triglyceride level monitoring on Propofol.    Monitoring and Evaluation:    [ x ] Tolerance to diet prescription [ x ] weights [ x ] labs[ x ] follow up per protocol  [ ] other:

## 2021-10-08 NOTE — CONSULT NOTE ADULT - SUBJECTIVE AND OBJECTIVE BOX
Chief Complaint:  Patient is a 76y old  Male who presents with a chief complaint of R Foot Pain (08 Oct 2021 12:24)    Hx obtained from chart.    HPI:  SHER ROBERT is a 77yo Male with Hx of HTN, HLD, CAD, s/p CABG, severe AS, PAD, s/p R partial 5th ray amputation (21) presented to [facility] ED on 21 with R foot pain and foul drainage a/w diabetic foot ulcer suspicious for osteomyelitis. s/p OR debridement, bone biopsy, wound vac on 21, acute OM, necrotic periosteal tissue. Wound Cx grew Enterococcus, Aeromonas, Klebsiella, Group B Strep . BCx were neg 21.  Tissue Cx neg. He was treated with Zosyn (-) and Vancomycin (-) and was switched to Unasyn (-10/8) and Levaquin (-10/8). He was switched to Meropenem and Vancomycin on 10/8/21.   He developed acute hypoxic respiratory failure due to pulmonary edema in setting of severe AS + aspirational PNA. He was also found to have pleural effusions, but thoracic surgery was unable to place chest tube. He was transferred to ICU 10/5/21. He got intubated 10/7, and became hypotensive on 10/7 with MAP in 50s/40s, now requiring pressor support.     Hepatology was consulted b/o sudden onset of hepatocellular liver injury: AST 26->1057, ALT 17-> 493, with normal ALP (99), bilirubin (0.6) and INR 1.42.   Nephrology following for OREN, Cr also worsened compared to 10/7.    CT a/p w oral contrast 21 showed normal liver, normal spleen and small layering gallstones in a distended GB; mild presacral fluid, no acute intra abdominal pathology.       PMHX/PSHX:    HTN (hypertension)  HLD (hyperlipidemia)  DM (diabetes mellitus)  CAD (coronary artery disease)  Glaucoma, angle-closure  [place] (hard of hearing)  S/P CABG (coronary artery bypass graft)  History of thyroid surgery      Allergies:  No Known Allergies      Home Medications: reviewed  Hospital Medications:  atorvastatin 80 milliGRAM(s) Oral at bedtime  chlorhexidine 0.12% Liquid 15 milliLiter(s) Oral Mucosa every 12 hours  chlorhexidine 2% Cloths 1 Application(s) Topical <User Schedule>  cyanocobalamin 1000 MICROGram(s) Oral daily  ergocalciferol 80521 Unit(s) Oral <User Schedule>  fentaNYL   Infusion 0.5 MICROgram(s)/kG/Hr IV Continuous <Continuous>  ferrous    sulfate 325 milliGRAM(s) Oral daily  finasteride 5 milliGRAM(s) Oral daily  furosemide   Injectable 40 milliGRAM(s) IV Push once  insulin lispro (ADMELOG) corrective regimen sliding scale   SubCutaneous Before meals and at bedtime  meropenem  IVPB 500 milliGRAM(s) IV Intermittent every 12 hours  norepinephrine Infusion 0.05 MICROgram(s)/kG/Min IV Continuous <Continuous>  ondansetron Injectable 4 milliGRAM(s) IV Push three times a day PRN  pantoprazole  Injectable 40 milliGRAM(s) IV Push at bedtime  propofol Infusion 30 MICROgram(s)/kG/Min IV Continuous <Continuous>  sodium chloride 0.9% lock flush 10 milliLiter(s) IV Push every 1 hour PRN  tamsulosin 0.4 milliGRAM(s) Oral at bedtime  vancomycin  IVPB 1250 milliGRAM(s) IV Intermittent daily        ROS:   Unable to obtain due to patient condition.     PHYSICAL EXAM:   Vital Signs:  Vital Signs Last 24 Hrs  T(C): 36 (08 Oct 2021 11:00), Max: 36.6 (07 Oct 2021 23:00)  T(F): 96.8 (08 Oct 2021 11:00), Max: 97.8 (07 Oct 2021 23:00)  HR: 57 (08 Oct 2021 13:15) (51 - 67)  BP: 136/54 (08 Oct 2021 13:15) (82/57 - 139/55)  BP(mean): 74 (08 Oct 2021 13:15) (62 - 84)  RR: 15 (08 Oct 2021 13:15) (11 - 27)  SpO2: 100% (08 Oct 2021 13:15) (79% - 100%)  Daily     Daily Weight in k.7 (08 Oct 2021 07:15)    GENERAL: no acute distress  NEURO: alert, no asterixis  HEENT: anicteric sclera, no conjunctival pallor appreciated  CHEST: no respiratory distress, no accessory muscle use  CARDIAC: regular rate, rhythm  ABDOMEN: soft, non-tender, non-distended, no rebound or guarding  EXTREMITIES: warm, well perfused, no edema  SKIN: no lesions noted    LABS: reviewed                        8.7    10.30 )-----------( 244      ( 08 Oct 2021 03:52 )             27.1     10    144  |  107  |  38<H>  ----------------------------<  93  3.8   |  26  |  3.03<H>    Ca    8.5      08 Oct 2021 03:52  Phos  6.4     10-08  Mg     2.4     10-    TPro  6.9  /  Alb  3.1<L>  /  TBili  0.6  /  DBili  x   /  AST  1057<H>  /  ALT  493<H>  /  AlkPhos  99  10-08    LIVER FUNCTIONS - ( 08 Oct 2021 03:52 )  Alb: 3.1 g/dL / Pro: 6.9 g/dL / ALK PHOS: 99 U/L / ALT: 493 U/L DA / AST: 1057 U/L / GGT: x               Diagnostic Studies: see sunrise for full report         Chief Complaint:  Patient is a 76y old  Male who presents with a chief complaint of R Foot Pain (08 Oct 2021 12:24)    Hx obtained from chart.    HPI:  SHER ROBERT is a 77yo Male with Hx of HTN, HLD, CAD, s/p CABG, severe AS, PAD, s/p R partial 5th ray amputation (21) presented to [facility] ED on 21 with R foot pain and foul drainage a/w diabetic foot ulcer suspicious for osteomyelitis. s/p OR debridement, bone biopsy, wound vac on 21, acute OM, necrotic periosteal tissue. Wound Cx grew Enterococcus, Aeromonas, Klebsiella, Group B Strep . BCx were neg 21.  Tissue Cx neg. He was treated with Zosyn (-) and Vancomycin (-) and was switched to Unasyn (-10/8) and Levaquin (-10/8). He was switched to Meropenem and Vancomycin on 10/8/21.   He developed acute hypoxic respiratory failure due to pulmonary edema in setting of severe AS + aspirational PNA. He was also found to have pleural effusions, but thoracic surgery was unable to place chest tube. He was transferred to ICU 10/5/21. He got intubated 10/7, and became hypotensive on 10/7 with MAP in 50s/40s, now requiring pressor support.     Hepatology was consulted b/o sudden onset of hepatocellular liver injury: AST 26->1057, ALT 17-> 493, with normal ALP (99), bilirubin (0.6) and INR 1.42.   Nephrology following for OREN, Cr also worsened compared to 10/7.    CT a/p w oral contrast 21 showed normal liver, normal spleen and small layering gallstones in a distended GB; mild presacral fluid, no acute intra abdominal pathology.       PMHX/PSHX:    HTN (hypertension)  HLD (hyperlipidemia)  DM (diabetes mellitus)  CAD (coronary artery disease)  Glaucoma, angle-closure  [place] (hard of hearing)  S/P CABG (coronary artery bypass graft)  History of thyroid surgery      Allergies:  No Known Allergies      Home Medications: reviewed  Hospital Medications:  atorvastatin 80 milliGRAM(s) Oral at bedtime  chlorhexidine 0.12% Liquid 15 milliLiter(s) Oral Mucosa every 12 hours  chlorhexidine 2% Cloths 1 Application(s) Topical <User Schedule>  cyanocobalamin 1000 MICROGram(s) Oral daily  ergocalciferol 54902 Unit(s) Oral <User Schedule>  fentaNYL   Infusion 0.5 MICROgram(s)/kG/Hr IV Continuous <Continuous>  ferrous    sulfate 325 milliGRAM(s) Oral daily  finasteride 5 milliGRAM(s) Oral daily  furosemide   Injectable 40 milliGRAM(s) IV Push once  insulin lispro (ADMELOG) corrective regimen sliding scale   SubCutaneous Before meals and at bedtime  meropenem  IVPB 500 milliGRAM(s) IV Intermittent every 12 hours  norepinephrine Infusion 0.05 MICROgram(s)/kG/Min IV Continuous <Continuous>  ondansetron Injectable 4 milliGRAM(s) IV Push three times a day PRN  pantoprazole  Injectable 40 milliGRAM(s) IV Push at bedtime  propofol Infusion 30 MICROgram(s)/kG/Min IV Continuous <Continuous>  sodium chloride 0.9% lock flush 10 milliLiter(s) IV Push every 1 hour PRN  tamsulosin 0.4 milliGRAM(s) Oral at bedtime  vancomycin  IVPB 1250 milliGRAM(s) IV Intermittent daily        ROS:   Unable to obtain due to patient condition.     PHYSICAL EXAM:   Vital Signs:  Vital Signs Last 24 Hrs  T(C): 36 (08 Oct 2021 11:00), Max: 36.6 (07 Oct 2021 23:00)  T(F): 96.8 (08 Oct 2021 11:00), Max: 97.8 (07 Oct 2021 23:00)  HR: 57 (08 Oct 2021 13:15) (51 - 67)  BP: 136/54 (08 Oct 2021 13:15) (82/57 - 139/55)  BP(mean): 74 (08 Oct 2021 13:15) (62 - 84)  RR: 15 (08 Oct 2021 13:15) (11 - 27)  SpO2: 100% (08 Oct 2021 13:15) (79% - 100%)  Daily     Daily Weight in k.7 (08 Oct 2021 07:15)    GENERAL: critically ill  NEURO: sedated  HEENT: anicteric sclera, no conjunctival pallor appreciated  CHEST: intubated, on vent  CARDIAC: on pressor support  ABDOMEN: soft, non-tender, non-distended, no rebound or guarding, BS+  EXTREMITIES: warm, well perfused, no edema  SKIN: Ecchymoses (on abdominal wall)    LABS: reviewed                        8.7    10.30 )-----------( 244      ( 08 Oct 2021 03:52 )             27.1     10    144  |  107  |  38<H>  ----------------------------<  93  3.8   |  26  |  3.03<H>    Ca    8.5      08 Oct 2021 03:52  Phos  6.4     10-08  Mg     2.4     10-    TPro  6.9  /  Alb  3.1<L>  /  TBili  0.6  /  DBili  x   /  AST  1057<H>  /  ALT  493<H>  /  AlkPhos  99  10-08    LIVER FUNCTIONS - ( 08 Oct 2021 03:52 )  Alb: 3.1 g/dL / Pro: 6.9 g/dL / ALK PHOS: 99 U/L / ALT: 493 U/L DA / AST: 1057 U/L / GGT: x               Diagnostic Studies: see sunrise for full report

## 2021-10-08 NOTE — CONSULT NOTE ADULT - CONSULT REQUESTED DATE/TIME
28-Sep-2021 13:30
05-Oct-2021 19:02
06-Oct-2021
26-Sep-2021 17:25
05-Oct-2021 15:51
16-Sep-2021 16:27
17-Sep-2021 18:35
05-Oct-2021 23:28
08-Oct-2021
18-Sep-2021 13:40
22-Sep-2021 13:24
23-Sep-2021 12:22
27-Sep-2021 11:07

## 2021-10-08 NOTE — PROGRESS NOTE ADULT - SUBJECTIVE AND OBJECTIVE BOX
Patient is a 76y old  Male who presents with a chief complaint of R Foot Pain (08 Oct 2021 11:48)    PATIENT IS SEEN AND EXAMINED IN MEDICAL FLOOR.  JOCELYN [    ]    MADDY [   ]      GT [   ]    ALLERGIES:  No Known Allergies      Daily     Daily Weight in k.7 (08 Oct 2021 07:15)    VITALS:    Vital Signs Last 24 Hrs  T(C): 36 (08 Oct 2021 11:00), Max: 36.6 (07 Oct 2021 23:00)  T(F): 96.8 (08 Oct 2021 11:00), Max: 97.8 (07 Oct 2021 23:00)  HR: 54 (08 Oct 2021 11:50) (51 - 67)  BP: 124/47 (08 Oct 2021 11:30) (74/44 - 136/60)  BP(mean): 67 (08 Oct 2021 11:30) (47 - 79)  RR: 14 (08 Oct 2021 11:30) (11 - 29)  SpO2: 100% (08 Oct 2021 11:50) (79% - 100%)    LABS:    CBC Full  -  ( 08 Oct 2021 03:52 )  WBC Count : 10.30 K/uL  RBC Count : 2.94 M/uL  Hemoglobin : 8.7 g/dL  Hematocrit : 27.1 %  Platelet Count - Automated : 244 K/uL  Mean Cell Volume : 92.2 fl  Mean Cell Hemoglobin : 29.6 pg  Mean Cell Hemoglobin Concentration : 32.1 gm/dL  Auto Neutrophil # : 8.62 K/uL  Auto Lymphocyte # : 1.12 K/uL  Auto Monocyte # : 0.42 K/uL  Auto Eosinophil # : 0.00 K/uL  Auto Basophil # : 0.05 K/uL  Auto Neutrophil % : 83.6 %  Auto Lymphocyte % : 10.9 %  Auto Monocyte % : 4.1 %  Auto Eosinophil % : 0.0 %  Auto Basophil % : 0.5 %    PT/INR - ( 07 Oct 2021 04:07 )   PT: 16.6 sec;   INR: 1.42 ratio           10-    144  |  107  |  38<H>  ----------------------------<  93  3.8   |  26  |  3.03<H>    Ca    8.5      08 Oct 2021 03:52  Phos  6.4     10-08  Mg     2.4     10-08    TPro  6.9  /  Alb  3.1<L>  /  TBili  0.6  /  DBili  x   /  AST  1057<H>  /  ALT  493<H>  /  AlkPhos  99  10-08    CAPILLARY BLOOD GLUCOSE      POCT Blood Glucose.: 98 mg/dL (08 Oct 2021 11:05)  POCT Blood Glucose.: 121 mg/dL (08 Oct 2021 05:38)  POCT Blood Glucose.: 130 mg/dL (07 Oct 2021 22:01)  POCT Blood Glucose.: 149 mg/dL (07 Oct 2021 17:22)        LIVER FUNCTIONS - ( 08 Oct 2021 03:52 )  Alb: 3.1 g/dL / Pro: 6.9 g/dL / ALK PHOS: 99 U/L / ALT: 493 U/L DA / AST: 1057 U/L / GGT: x           Creatinine Trend: 3.03<--, 2.12<--, 2.02<--, 2.11<--, 2.17<--, 2.13<--  I&O's Summary    07 Oct 2021 07:01  -  08 Oct 2021 07:00  --------------------------------------------------------  IN: 1317.3 mL / OUT: 204 mL / NET: 1113.3 mL    08 Oct 2021 07:01  -  08 Oct 2021 12:24  --------------------------------------------------------  IN: 77.7 mL / OUT: 45 mL / NET: 32.7 mL        ABG - ( 08 Oct 2021 03:26 )  pH, Arterial: 7.41  pH, Blood: x     /  pCO2: 38    /  pO2: 96    / HCO3: 24    / Base Excess: -0.3  /  SaO2: 98                  .Tissue Right 5th toe r/o osteomyeltis   @ 13:54   No growth at 5 days  --    No polymorphonuclear cells seen per low power field  No organisms seen per oil power field      .Blood Blood-Venous   @ 10:28   No Growth Final  --  --      .Surgical Swab right foot wound   @ 21:42   Culture yields >4 types of aerobic and/or anaerobic bacteria  Call client services within 7 days if further workup is clinically  indicated. Culture includes  Rare Aeromonas hydrophila/caviae  Few Klebsiella oxytoca/Raoutella ornithinolytica  Few Enterococcus faecalis  Few Streptococcus agalactiae (Group B) isolated  Group B streptococci are susceptible to ampicillin,  penicillin and cefazolin, but may be resistant to  erythromycin and clindamycin.  Recommendations for intrapartum prophylaxis for Group B  streptococci are penicillin or ampicillin.  --  Aeromonas hydrophila/caviae  Klebsiella oxytoca /Raoutella ornithinolytica  Enterococcus faecalis      Bone R 5th metatarsal bone clean ma   @ 03:59   No growth at 5 days  --    No polymorphonuclear leukocytes seen per low power field  No organisms seen per oil power field      .Blood Blood-Venous   @ 21:09   No Growth Final  --  --      .Surgical Swab Right foot plantar wound   @ 21:08   Moderate Streptococcus agalactiae (Group B) isolated  Group B streptococci are susceptible to ampicillin,  penicillin and cefazolin, but may be resistant to  erythromycin and clindamycin.  Recommendations for intrapartum prophylaxis for Group B  streptococci are penicillin or ampicillin.  Moderate Bacteroides fragilis "Susceptibilities not performed"  Normal skin filiberto isolated  --  --          MEDICATIONS:    MEDICATIONS  (STANDING):  atorvastatin 80 milliGRAM(s) Oral at bedtime  chlorhexidine 0.12% Liquid 15 milliLiter(s) Oral Mucosa every 12 hours  chlorhexidine 2% Cloths 1 Application(s) Topical <User Schedule>  cyanocobalamin 1000 MICROGram(s) Oral daily  ergocalciferol 94857 Unit(s) Oral <User Schedule>  fentaNYL   Infusion 0.5 MICROgram(s)/kG/Hr (4.35 mL/Hr) IV Continuous <Continuous>  ferrous    sulfate 325 milliGRAM(s) Oral daily  finasteride 5 milliGRAM(s) Oral daily  furosemide   Injectable 40 milliGRAM(s) IV Push once  insulin lispro (ADMELOG) corrective regimen sliding scale   SubCutaneous Before meals and at bedtime  meropenem  IVPB 500 milliGRAM(s) IV Intermittent every 12 hours  norepinephrine Infusion 0.05 MICROgram(s)/kG/Min (4.07 mL/Hr) IV Continuous <Continuous>  pantoprazole  Injectable 40 milliGRAM(s) IV Push at bedtime  propofol Infusion 30 MICROgram(s)/kG/Min (15.6 mL/Hr) IV Continuous <Continuous>  tamsulosin 0.4 milliGRAM(s) Oral at bedtime  vancomycin  IVPB 1250 milliGRAM(s) IV Intermittent daily      MEDICATIONS  (PRN):  ondansetron Injectable 4 milliGRAM(s) IV Push three times a day PRN Nausea and/or Vomiting  sodium chloride 0.9% lock flush 10 milliLiter(s) IV Push every 1 hour PRN Pre/post blood products, medications, blood draw, and to maintain line patency      REVIEW OF SYSTEMS:                           ALL ROS DONE [ X   ]    CONSTITUTIONAL:  LETHARGIC [   ], FEVER [   ], UNRESPONSIVE [   ]  CVS:  CP  [   ], SOB, [   ], PALPITATIONS [   ], DIZZYNESS [   ]  RS: COUGH [   ], SPUTUM [   ]  GI: ABDOMINAL PAIN [   ], NAUSEA [   ], VOMITINGS [   ], DIARRHEA [   ], CONSTIPATION [   ]  :  DYSURIA [   ], NOCTURIA [   ], INCREASED FREQUENCY [   ], DRIBLING [   ],  SKELETAL: PAINFUL JOINTS [   ], SWOLLEN JOINTS [   ], NECK ACHE [   ], LOW BACK ACHE [   ],  SKIN : ULCERS [   ], RASH [   ], ITCHING [   ]  CNS: HEAD ACHE [   ], DOUBLE VISION [   ], BLURRED VISION [   ], AMS / CONFUSION [   ], SEIZURES [   ], WEAKNESS [   ],TINGLING / NUMBNESS [   ]    PHYSICAL EXAMINATION:  GENERAL APPEARANCE: NO DISTRESS  HEENT:  NO PALLOR, NO  JVD,  NO   NODES, NECK SUPPLE  CVS: S1 +, S2 +,   RS: AEEB,  OCCASIONAL  RALES +,   NO RONCHI, CRACKLES +    HFNC  ABD: SOFT, NT, NO, BS +       EXT: NO PE  SKIN: WARM,   RIGHT FOOT WRAPPED W/ WOUND VAC IN PLACE  SKELETAL:  ROM ACCEPTABLE  CNS:  AAO X  1    RADIOLOGY :    EXAM:  CT ABDOMEN AND PELVIS OC                            PROCEDURE DATE:  2021          INTERPRETATION:  CLINICAL INFORMATION: Small bowel obstruction.    COMPARISON: None.    CONTRAST/COMPLICATIONS:  IV Contrast: NONE  Oral Contrast: Omnipaque 300  Complications: None reported at time of study completion    PROCEDURE:  CT of the Abdomen and Pelvis was performed.  Sagittal and coronal reformats were performed.    FINDINGS:  LOWER CHEST: Small bilateral pleural effusions with compressiveatelectasis of both lower lobes.    LIVER: Within normal limits.  BILE DUCTS: Normal caliber.  GALLBLADDER: Distended. Small layering gallstones.  SPLEEN: Within normal limits.  PANCREAS: Within normal limits.  ADRENALS: Within normal limits.  KIDNEYS/URETERS: No hydronephrosis. Nonobstructing left upper pole calculus, measuring 0.6 cm.    BLADDER: Urinary bladder contains air and a Castañeda catheter balloon.  REPRODUCTIVE ORGANS: Prostate is not enlarged.    BOWEL: No bowel obstruction. Appendix isnormal. Colonic diverticulosis.  PERITONEUM: Mild presacral fluid.  VESSELS: Atherosclerotic changes.  RETROPERITONEUM/LYMPH NODES: No lymphadenopathy.  ABDOMINAL WALL: Within normal limits.  BONES: Degenerative changes. Sternotomy.    IMPRESSION:  No acute intra-abdominal pathology.    Small bilateral pleural effusions with compressive atelectasis of both lower lobes.    ====================================================    EXAM:  MR FOOT RT                            PROCEDURE DATE:  2021          INTERPRETATION:  Clinical Information: Recent fifth metatarsal head resection now with fifth digit pain, swelling and foul discharge.    Comparison: Radiographs of the right foot from 2021 and MRI the right foot from 2021.    Technique:  MRI of the right midfoot and forefoot.  Intravenous Contrast: None.    Findings:    There is a resection at the mid aspect of the fifth metatarsal shaft. There is a lateral soft tissue wound beginning at the level of the amputation which extends distally. There is susceptibility artifact in the region of the amputation consistent with postoperative change. There is hyperintense T2 marrow signal throughout the remaining fifth metatarsal and within the adjacent fourth metatarsal head which is nonspecific and could be related to recent postoperative changes although osteomyelitis is suspected.    There is edema and mild atrophy within the plantar muscles of the foot. There is minimal spurring at the first metatarsophalangeal and hallux sesamoid articulations.    Impression:  Resection at the mid aspect of the fifth metatarsal. Lateral soft tissue wound with marrow signal abnormality throughout the fifth metatarsal and within the adjacent fourth metatarsal head which is nonspecific in the setting of recent surgery although osteomyelitis is suspected.        ASSESSMENT :     Headache    HTN (hypertension)    HLD (hyperlipidemia)    DM (diabetes mellitus)    CAD (coronary artery disease)    Glaucoma, angle-closure    [place] (hard of hearing)    No significant past surgical history    S/P CABG (coronary artery bypass graft)    History of thyroid surgery        PLAN:  HPI:  76M from home, lives with daughter w/ PMH DM on insulin, HTN, HLD, CAD, PSH R partial 5th ray amputation 2021 p/w R foot pain x1 day associated with foul smelling discharge. Pt with sharp pain on the R lateral foot. As per daughter, pt was taking tylenol which did not help his pain prompting the patient to ask his daughter to take him to the hospital. Pt is a poor historian. Daughter states that the patient did not see his podiatrist after the surgery. No fever, chest, pain, palpitations, nausea, vomiting, diarrhea (17 Sep 2021 01:11)    # TRANSFERRED TO ICU FOR WORSENING HYPOXIA AND DYSPNEA    # SUSPECT RIGHT FOOT OSTEOMYELITIS S/P RIGHT 5TH RAY RESECTION [] AND S/P DEBRIDEMENT, GRAFT APPLICATION, BONE BX AND WOUND VAC APPLICATION   ** RECENT ADMISSION FOR RIGHT DIABETIC FOOT ULCER, CELLULITIS, OSTEOMYELITIS RIGHT 5th METATARSAL S/P RIGHT 5TH PARTIAL RAY AMPUTATION [] - BONE PATHOLOGY W/ CLEAN MARGINS    - S/P VANCOMYCIN AND ZOSYN ; NOW ON UNASYN AND LEVAQUIN GIVEN OREN; PER ID SWITCH TO ZOSYN UNTIL 11/3  - RECENTLY HAD SIMON W/ MILD PAD  - REVIEWED WOUND CX [ ENTEROCOCCUS, AEROMONAS, KLEBSIELLA] AND BCX  - BONE BX - acute osteomyelitis and necrotic periosteal tissue.   - ID CONSULT, PODIATRY CONSULT    - PICC LINE PLACED TODAY TO COMPLETE 6 WEEKS OF ANTIBIOTICS - PER ID SWITCH TO ZOSYN UNTIL 11/3    # ACUTE HYPOXIC RESPIRATORY FAILURE SUSPECT S/T PULMONARY EDEMA IN THE SETTING OF SEVERE AORTIC STENOSIS + ASPIRATION PNA - ON LEVAQUIN, STARTED LASIX, CARDIOLOGY CONSULT IN PROGRESS  - S/P CRITICAL CARE CONSULT  - S/P LASIX ON 10/1 - 10/2, 10/4 AND 10/5  - CT CHEST W/ BILATERAL PLEURAL EFFUSIONS [10/5] - PULMONOLOGY CONSULT FOR POSSIBLE THORACENTESIS & WILL CONTINUE LASIX   - ALSO F/U REPEAT ECHOCARDIOGRAM  - CT SURGERY WAS CONSULTED BUT UNABLE TO PLACE PIGTAIL, THUS IR CONSULT IN PROGRESS FOR PIGTAIL PLACEMENT  - PATIENT ON HFNC AT THE TIME OF ENCOUNTER, SUBSEQUENTLY INTUBATED    # ? HYPOTENSION - S/T HYPOVOLEMIA VS. SEPSIS - WAS ON PHENYLEPHRINE, MIDODRINE   - CENTRAL LINE TO BE PLACED W/ VASOPRESSORS  - F/U BCX, F/U UA  - ON UNASYN, LEVAQUIN, FLUCONAZOLE, ID CONSULT IN PROGRESS    # FUNGURIA - ON FLUCONAZOLE    # BOWEL DISTENTION - ? ILEUS - OPTIMIZING ELECTROLYTES - IMPROVED  - SURGERY CONSULT IN PROGRESS  - PASSED 2 BMS ON     # ASPIRATION EVENT WHEN PATIENT REMOVED NGT - ON LEVAQUIN, ID CONSULT IN PROGRESS    # CHOLELITHIASIS - TRENDING LFTS, RUQ U/S - CHOLELITHIASIS, SURGERY CONSULT IN PROGRESS  - GI CONSULT IN PROGRESS - LESS SUSPICIOUS FOR CHOLECYSTITIS    # DYSPEPSIA - PLACED ON PPI BID WITH IMPROVEMENT, UNDERWENT ST EVAL     # ELEVATED TROPONINS - NSTEMI - ? DEMAND - WILL NEED ISCHEMIC EVAL ONCE ACUTE INFECTION RESOLVES PER CARDIOLOGY, CARDIOLOGY CONSULT IN PROGRESS  - ON ASA, STATIN, BB    # OREN ON CKD - S/T ATN AND URINARY RETENTION - S/P IVF, NOW W/ CASTAÑEDA, NEPHROLOGY CONUSLT IN PROGRESS  - ON FLOMAX, ADDED FINASTERIDE    - WITH WORSENING RENAL FXN - NOTED URINARY RETENTION [10/4] - REPLACED CASTAÑEDA    # MEDICAL CLEARANCE FOR SURGERY - RCRI - 2 - 10.1 % 30 DAY RISK OF DEATH, MI OR CARDIAC ARREST  - CARDIOLOGY CONSULT     # DM -  HBA1C - 11  - LANTUS, SSI + FS    # CAD S/P CABG - PLACED ON ASA, STATIN, BB  - ECHO - SEVERE AORTIC STENOSIS, SEVERE CONCENTRIC LVH, G1DD, MILD NV  - CARDIOLOGY CONSULT - DR. BASSETT   - MAY NEED ISCHEMIC EVAL ONCE ACUTE ILLNESS RESOLVES    # HTN - ON METOPROLOL, NICARDIPINE    # SEVERE, ASYMPTOMATIC, STENOSIS - ONCE ACUTE ILLNESS RESOLVES, WILL LIKELY NEED ISCHEMIC WORKUP, SABIHA TO EVALUATE FURTHER. D/W PATIENT, DAUGHTER AND CARDIOLOGY TEAM [PREVIOUSLY SAW DR. BASSETT]    # VITAMIN D DEFICIENCY - ON CHOLECALCIFEROL    # HLD - STATIN    # CASE DISCUSSED AT LENGTH WITH PATIENT AND DAUGHTER, BIRDIE ROBERT AT BEDSIDE AND VIA PHONE @ [phone] [10/5] - CASE DICUSSED AT LENGTH AND ALL QUESTIONS WERE ANSWERED. DAUGHTER UNDERSTOOD THAT PROGNOSIS IS GUARDED.  # PATIENT AND DAUGHTER REFUSED [facility] ON PREVIOUS ADMISSION, PREVIOUSLY WISHED FOR PATIENT RETURN HOME W/ HOME PT; HOWEVER NOW, DAUGHTER WISHES FOR PATIENT TO GO TO [facility], AS PATIENT'S WIFE IS CURRENTLY ADMITTED THERE    # GI AND DVT PPX   Patient is a 76y old  Male who presents with a chief complaint of R Foot Pain (08 Oct 2021 11:48)    PATIENT IS SEEN AND EXAMINED IN MEDICAL FLOOR.    ALLERGIES:  No Known Allergies      Daily     Daily Weight in k.7 (08 Oct 2021 07:15)    VITALS:    Vital Signs Last 24 Hrs  T(C): 36 (08 Oct 2021 11:00), Max: 36.6 (07 Oct 2021 23:00)  T(F): 96.8 (08 Oct 2021 11:00), Max: 97.8 (07 Oct 2021 23:00)  HR: 54 (08 Oct 2021 11:50) (51 - 67)  BP: 124/47 (08 Oct 2021 11:30) (74/44 - 136/60)  BP(mean): 67 (08 Oct 2021 11:30) (47 - 79)  RR: 14 (08 Oct 2021 11:30) (11 - 29)  SpO2: 100% (08 Oct 2021 11:50) (79% - 100%)    LABS:    CBC Full  -  ( 08 Oct 2021 03:52 )  WBC Count : 10.30 K/uL  RBC Count : 2.94 M/uL  Hemoglobin : 8.7 g/dL  Hematocrit : 27.1 %  Platelet Count - Automated : 244 K/uL  Mean Cell Volume : 92.2 fl  Mean Cell Hemoglobin : 29.6 pg  Mean Cell Hemoglobin Concentration : 32.1 gm/dL  Auto Neutrophil # : 8.62 K/uL  Auto Lymphocyte # : 1.12 K/uL  Auto Monocyte # : 0.42 K/uL  Auto Eosinophil # : 0.00 K/uL  Auto Basophil # : 0.05 K/uL  Auto Neutrophil % : 83.6 %  Auto Lymphocyte % : 10.9 %  Auto Monocyte % : 4.1 %  Auto Eosinophil % : 0.0 %  Auto Basophil % : 0.5 %    PT/INR - ( 07 Oct 2021 04:07 )   PT: 16.6 sec;   INR: 1.42 ratio           10-08    144  |  107  |  38<H>  ----------------------------<  93  3.8   |  26  |  3.03<H>    Ca    8.5      08 Oct 2021 03:52  Phos  6.4     10-08  Mg     2.4     10-08    TPro  6.9  /  Alb  3.1<L>  /  TBili  0.6  /  DBili  x   /  AST  1057<H>  /  ALT  493<H>  /  AlkPhos  99  10-08    CAPILLARY BLOOD GLUCOSE      POCT Blood Glucose.: 98 mg/dL (08 Oct 2021 11:05)  POCT Blood Glucose.: 121 mg/dL (08 Oct 2021 05:38)  POCT Blood Glucose.: 130 mg/dL (07 Oct 2021 22:01)  POCT Blood Glucose.: 149 mg/dL (07 Oct 2021 17:22)        LIVER FUNCTIONS - ( 08 Oct 2021 03:52 )  Alb: 3.1 g/dL / Pro: 6.9 g/dL / ALK PHOS: 99 U/L / ALT: 493 U/L DA / AST: 1057 U/L / GGT: x           Creatinine Trend: 3.03<--, 2.12<--, 2.02<--, 2.11<--, 2.17<--, 2.13<--  I&O's Summary    07 Oct 2021 07:01  -  08 Oct 2021 07:00  --------------------------------------------------------  IN: 1317.3 mL / OUT: 204 mL / NET: 1113.3 mL    08 Oct 2021 07:01  -  08 Oct 2021 12:24  --------------------------------------------------------  IN: 77.7 mL / OUT: 45 mL / NET: 32.7 mL        ABG - ( 08 Oct 2021 03:26 )  pH, Arterial: 7.41  pH, Blood: x     /  pCO2: 38    /  pO2: 96    / HCO3: 24    / Base Excess: -0.3  /  SaO2: 98                  .Tissue Right 5th toe r/o osteomyeltis   @ 13:54   No growth at 5 days  --    No polymorphonuclear cells seen per low power field  No organisms seen per oil power field      .Blood Blood-Venous   @ 10:28   No Growth Final  --  --      .Surgical Swab right foot wound   @ 21:42   Culture yields >4 types of aerobic and/or anaerobic bacteria  Call client services within 7 days if further workup is clinically  indicated. Culture includes  Rare Aeromonas hydrophila/caviae  Few Klebsiella oxytoca/Raoutella ornithinolytica  Few Enterococcus faecalis  Few Streptococcus agalactiae (Group B) isolated  Group B streptococci are susceptible to ampicillin,  penicillin and cefazolin, but may be resistant to  erythromycin and clindamycin.  Recommendations for intrapartum prophylaxis for Group B  streptococci are penicillin or ampicillin.  --  Aeromonas hydrophila/caviae  Klebsiella oxytoca /Raoutella ornithinolytica  Enterococcus faecalis      Bone R 5th metatarsal bone clean ma   @ 03:59   No growth at 5 days  --    No polymorphonuclear leukocytes seen per low power field  No organisms seen per oil power field      .Blood Blood-Venous   @ 21:09   No Growth Final  --  --      .Surgical Swab Right foot plantar wound   @ 21:08   Moderate Streptococcus agalactiae (Group B) isolated  Group B streptococci are susceptible to ampicillin,  penicillin and cefazolin, but may be resistant to  erythromycin and clindamycin.  Recommendations for intrapartum prophylaxis for Group B  streptococci are penicillin or ampicillin.  Moderate Bacteroides fragilis "Susceptibilities not performed"  Normal skin filiberto isolated  --  --          MEDICATIONS:    MEDICATIONS  (STANDING):  atorvastatin 80 milliGRAM(s) Oral at bedtime  chlorhexidine 0.12% Liquid 15 milliLiter(s) Oral Mucosa every 12 hours  chlorhexidine 2% Cloths 1 Application(s) Topical <User Schedule>  cyanocobalamin 1000 MICROGram(s) Oral daily  ergocalciferol 14731 Unit(s) Oral <User Schedule>  fentaNYL   Infusion 0.5 MICROgram(s)/kG/Hr (4.35 mL/Hr) IV Continuous <Continuous>  ferrous    sulfate 325 milliGRAM(s) Oral daily  finasteride 5 milliGRAM(s) Oral daily  furosemide   Injectable 40 milliGRAM(s) IV Push once  insulin lispro (ADMELOG) corrective regimen sliding scale   SubCutaneous Before meals and at bedtime  meropenem  IVPB 500 milliGRAM(s) IV Intermittent every 12 hours  norepinephrine Infusion 0.05 MICROgram(s)/kG/Min (4.07 mL/Hr) IV Continuous <Continuous>  pantoprazole  Injectable 40 milliGRAM(s) IV Push at bedtime  propofol Infusion 30 MICROgram(s)/kG/Min (15.6 mL/Hr) IV Continuous <Continuous>  tamsulosin 0.4 milliGRAM(s) Oral at bedtime  vancomycin  IVPB 1250 milliGRAM(s) IV Intermittent daily      MEDICATIONS  (PRN):  ondansetron Injectable 4 milliGRAM(s) IV Push three times a day PRN Nausea and/or Vomiting  sodium chloride 0.9% lock flush 10 milliLiter(s) IV Push every 1 hour PRN Pre/post blood products, medications, blood draw, and to maintain line patency      REVIEW OF SYSTEMS:                           ALL ROS DONE [ X   ]    CONSTITUTIONAL:  LETHARGIC [   ], FEVER [   ], UNRESPONSIVE [   ]  CVS:  CP  [   ], SOB, [   ], PALPITATIONS [   ], DIZZYNESS [   ]  RS: COUGH [   ], SPUTUM [   ]  GI: ABDOMINAL PAIN [   ], NAUSEA [   ], VOMITINGS [   ], DIARRHEA [   ], CONSTIPATION [   ]  :  DYSURIA [   ], NOCTURIA [   ], INCREASED FREQUENCY [   ], DRIBLING [   ],  SKELETAL: PAINFUL JOINTS [   ], SWOLLEN JOINTS [   ], NECK ACHE [   ], LOW BACK ACHE [   ],  SKIN : ULCERS [   ], RASH [   ], ITCHING [   ]  CNS: HEAD ACHE [   ], DOUBLE VISION [   ], BLURRED VISION [   ], AMS / CONFUSION [   ], SEIZURES [   ], WEAKNESS [   ],TINGLING / NUMBNESS [   ]    PHYSICAL EXAMINATION:  GENERAL APPEARANCE: NO DISTRESS  HEENT:  NO PALLOR, NO  JVD,  NO   NODES, NECK SUPPLE  CVS: S1 +, S2 +,   RS: AEEB,  OCCASIONAL  RALES +,   NO RONCHI, CRACKLES +   ET+  ABD: SOFT, NT, NO, BS +       EXT: NO PE  SKIN: WARM,   RIGHT FOOT WRAPPED W/ WOUND VAC IN PLACE   ,   CVC+  ,   CT + [RIGH] ATTACHED TO PLEUROVAC  SKELETAL:  ROM ACCEPTABLE  CNS:  AAO X  0    RADIOLOGY :    EXAM:  CT ABDOMEN AND PELVIS OC                            PROCEDURE DATE:  2021          INTERPRETATION:  CLINICAL INFORMATION: Small bowel obstruction.    COMPARISON: None.    CONTRAST/COMPLICATIONS:  IV Contrast: NONE  Oral Contrast: Omnipaque 300  Complications: None reported at time of study completion    PROCEDURE:  CT of the Abdomen and Pelvis was performed.  Sagittal and coronal reformats were performed.    FINDINGS:  LOWER CHEST: Small bilateral pleural effusions with compressiveatelectasis of both lower lobes.    LIVER: Within normal limits.  BILE DUCTS: Normal caliber.  GALLBLADDER: Distended. Small layering gallstones.  SPLEEN: Within normal limits.  PANCREAS: Within normal limits.  ADRENALS: Within normal limits.  KIDNEYS/URETERS: No hydronephrosis. Nonobstructing left upper pole calculus, measuring 0.6 cm.    BLADDER: Urinary bladder contains air and a Castañeda catheter balloon.  REPRODUCTIVE ORGANS: Prostate is not enlarged.    BOWEL: No bowel obstruction. Appendix isnormal. Colonic diverticulosis.  PERITONEUM: Mild presacral fluid.  VESSELS: Atherosclerotic changes.  RETROPERITONEUM/LYMPH NODES: No lymphadenopathy.  ABDOMINAL WALL: Within normal limits.  BONES: Degenerative changes. Sternotomy.    IMPRESSION:  No acute intra-abdominal pathology.    Small bilateral pleural effusions with compressive atelectasis of both lower lobes.    ====================================================    EXAM:  MR FOOT RT                            PROCEDURE DATE:  2021          INTERPRETATION:  Clinical Information: Recent fifth metatarsal head resection now with fifth digit pain, swelling and foul discharge.    Comparison: Radiographs of the right foot from 2021 and MRI the right foot from 2021.    Technique:  MRI of the right midfoot and forefoot.  Intravenous Contrast: None.    Findings:    There is a resection at the mid aspect of the fifth metatarsal shaft. There is a lateral soft tissue wound beginning at the level of the amputation which extends distally. There is susceptibility artifact in the region of the amputation consistent with postoperative change. There is hyperintense T2 marrow signal throughout the remaining fifth metatarsal and within the adjacent fourth metatarsal head which is nonspecific and could be related to recent postoperative changes although osteomyelitis is suspected.    There is edema and mild atrophy within the plantar muscles of the foot. There is minimal spurring at the first metatarsophalangeal and hallux sesamoid articulations.    Impression:  Resection at the mid aspect of the fifth metatarsal. Lateral soft tissue wound with marrow signal abnormality throughout the fifth metatarsal and within the adjacent fourth metatarsal head which is nonspecific in the setting of recent surgery although osteomyelitis is suspected.        ASSESSMENT :     Headache    HTN (hypertension)    HLD (hyperlipidemia)    DM (diabetes mellitus)    CAD (coronary artery disease)    Glaucoma, angle-closure    [place] (hard of hearing)    No significant past surgical history    S/P CABG (coronary artery bypass graft)    History of thyroid surgery        PLAN:  HPI:  76M from home, lives with daughter w/ PMH DM on insulin, HTN, HLD, CAD, PSH R partial 5th ray amputation 2021 p/w R foot pain x1 day associated with foul smelling discharge. Pt with sharp pain on the R lateral foot. As per daughter, pt was taking tylenol which did not help his pain prompting the patient to ask his daughter to take him to the hospital. Pt is a poor historian. Daughter states that the patient did not see his podiatrist after the surgery. No fever, chest, pain, palpitations, nausea, vomiting, diarrhea (17 Sep 2021 01:11)    # TRANSFERRED TO ICU FOR WORSENING HYPOXIA AND DYSPNEA    # SUSPECT RIGHT FOOT OSTEOMYELITIS S/P RIGHT 5TH RAY RESECTION [] AND S/P DEBRIDEMENT, GRAFT APPLICATION, BONE BX AND WOUND VAC APPLICATION   ** RECENT ADMISSION FOR RIGHT DIABETIC FOOT ULCER, CELLULITIS, OSTEOMYELITIS RIGHT 5th METATARSAL S/P RIGHT 5TH PARTIAL RAY AMPUTATION [] - BONE PATHOLOGY W/ CLEAN MARGINS    - S/P VANCOMYCIN AND ZOSYN ; NOW ON UNASYN AND LEVAQUIN GIVEN OREN; PER ID SWITCH TO ZOSYN UNTIL 11/3  - RECENTLY HAD SIMON W/ MILD PAD  - REVIEWED WOUND CX [ ENTEROCOCCUS, AEROMONAS, KLEBSIELLA] AND BCX  - BONE BX - acute osteomyelitis and necrotic periosteal tissue.   - ID CONSULT, PODIATRY CONSULT    - PICC LINE PLACED TODAY TO COMPLETE 6 WEEKS OF ANTIBIOTICS - PER ID SWITCH TO ZOSYN UNTIL 11/3    # ACUTE HYPOXIC RESPIRATORY FAILURE SUSPECT S/T PULMONARY EDEMA IN THE SETTING OF SEVERE AORTIC STENOSIS + ASPIRATION PNA - S/P CT TUBE PLACEMENT [SET TO PLEUROVAC] - SWITCHED FROM LEVAQUIN TO VANCOMYCIN + MEROPENEM, LASIX, CARDIOLOGY CONSULT IN PROGRESS  - S/P CRITICAL CARE CONSULT  - S/P LASIX ON 10/1 - 10/2, 10/4 AND 10/5  - CT CHEST W/ BILATERAL PLEURAL EFFUSIONS [10/5] - PULMONOLOGY CONSULT FOR POSSIBLE THORACENTESIS & WILL CONTINUE LASIX   - ALSO F/U REPEAT ECHOCARDIOGRAM  - CT SURGERY WAS CONSULTED BUT UNABLE TO PLACE PIGTAIL, THUS IR CONSULT IN PROGRESS FOR PIGTAIL PLACEMENT  - CHEST TUBE PLACED [10/8]  - NOTED REPEAT ECHO    # SHOCK - ? S/T HYPOVOLEMIA VS. SEPSIS - W/ CVC, ON VASOPRESSORS - SWITCHED TO VANCOMYCIN AND MEROPENEM  - F/U BCX, F/U UA  - ID CONSULT IN PROGRESS    # FUNGURIA - D/C FLUCONAZOLE GIVEN TRANSAMINITIS    # TRANSAMINITIS - SUSPECT THIS IS ISCHEMIC HEPATITIS - HEPATOLOGY CONSULT IN PROGRESS    # BOWEL DISTENTION - ? ILEUS - OPTIMIZING ELECTROLYTES - IMPROVED  - SURGERY CONSULT IN PROGRESS  - PASSED 2 BMS ON     # ASPIRATION EVENT WHEN PATIENT REMOVED NGT - ON LEVAQUIN, ID CONSULT IN PROGRESS    # CHOLELITHIASIS - TRENDING LFTS, RUQ U/S - CHOLELITHIASIS, SURGERY CONSULT IN PROGRESS  - GI CONSULT IN PROGRESS - LESS SUSPICIOUS FOR CHOLECYSTITIS    # DYSPEPSIA - PLACED ON PPI BID WITH IMPROVEMENT, UNDERWENT ST EVAL     # ELEVATED TROPONINS - NSTEMI - ? DEMAND - WILL NEED ISCHEMIC EVAL ONCE ACUTE INFECTION RESOLVES PER CARDIOLOGY, CARDIOLOGY CONSULT IN PROGRESS  - ON ASA, STATIN, BB    # OREN ON CKD - S/T ATN AND URINARY RETENTION - S/P IVF, NOW W/ CASTAÑEDA, NEPHROLOGY CONUSLT IN PROGRESS  - ON FLOMAX, ADDED FINASTERIDE    - WITH WORSENING RENAL FXN - NOTED URINARY RETENTION [10/4] - REPLACED CASTAÑEDA    # MEDICAL CLEARANCE FOR SURGERY - RCRI - 2 - 10.1 % 30 DAY RISK OF DEATH, MI OR CARDIAC ARREST  - CARDIOLOGY CONSULT     # DM -  HBA1C - 11  - LANTUS, SSI + FS    # CAD S/P CABG - PLACED ON ASA, STATIN, BB  - ECHO - SEVERE AORTIC STENOSIS, SEVERE CONCENTRIC LVH, G1DD, MILD DE  - CARDIOLOGY CONSULT - DR. BASSETT   - MAY NEED ISCHEMIC EVAL ONCE ACUTE ILLNESS RESOLVES    # HTN - ON METOPROLOL, NICARDIPINE    # SEVERE, ASYMPTOMATIC, STENOSIS - ONCE ACUTE ILLNESS RESOLVES, WILL LIKELY NEED ISCHEMIC WORKUP, SABIHA TO EVALUATE FURTHER. D/W PATIENT, DAUGHTER AND CARDIOLOGY TEAM [PREVIOUSLY SAW DR. BASSETT]    # VITAMIN D DEFICIENCY - ON CHOLECALCIFEROL    # HLD - STATIN    # CASE DISCUSSED AT LENGTH WITH PATIENT AND DAUGHTER, BIRDIE ROBERT AT BEDSIDE AND VIA PHONE @ [phone] [10/5] - CASE DICUSSED AT LENGTH AND ALL QUESTIONS WERE ANSWERED. DAUGHTER UNDERSTOOD THAT PROGNOSIS IS GUARDED.  # PATIENT AND DAUGHTER REFUSED [facility] ON PREVIOUS ADMISSION, PREVIOUSLY WISHED FOR PATIENT RETURN HOME W/ HOME PT; HOWEVER NOW, DAUGHTER WISHES FOR PATIENT TO GO TO [facility], AS PATIENT'S WIFE IS CURRENTLY ADMITTED THERE    # GI AND DVT PPX

## 2021-10-08 NOTE — CHART NOTE - NSCHARTNOTEFT_GEN_A_CORE
10/7/2021  Attempted RIJ, patient did not tolerated left rodriguez flexion of neck due to shortness of breath and low sats other than in face forward position, guide wire could not be advanced.  Left femoral placed instead, for blood pressure optimization.    Will place LIJ today. 10/8/2021

## 2021-10-08 NOTE — PRE PROCEDURE NOTE - PRE PROCEDURE EVALUATION
76M with bilateral pleural effusions, right greater than left. Now planned for right chest tube placement.

## 2021-10-08 NOTE — CONSULT NOTE ADULT - REASON FOR ADMISSION
R Foot Pain

## 2021-10-08 NOTE — PROGRESS NOTE ADULT - SUBJECTIVE AND OBJECTIVE BOX
[facility] NEPHROLOGY- PROGRESS NOTE    Patient is a 77yo Male with DM on insulin, HTN, HLD, CAD, s/p R partial 5th ray amputation 8/19/2021 p/w R foot pain and foul drainage a/w diabetic foot ulcer suspicious for osteomyelitis. s/p OR debridement today. Nephrology consulted for abrupt rise in SCr.   s/p ibrahim placement for distended bladder on 9/23 with ~400ml of urine o/p  9/27- pt with SOB; CXR with pulmonary edema- pt given Lasix 80mg IV x1. Concern for aspiration PNA  Course BP: s/p lasix 60mg IV on 10/1 & 10/2 and s/p Lasix 40mg IV x1 today 10/4. Bladder scan with 1L urine; s/p ibrahim reinserted  Transferred to ICU for acute hypoxic respiratory failure, s/p intubated on 10/7    Hospital Medications: Medications reviewed.  REVIEW OF SYSTEMS: Unable to obtain; sedated and intubated    VITALS:  T(F): 96.8 (10-08-21 @ 11:00), Max: 97.8 (10-07-21 @ 23:00)  HR: 54 (10-08-21 @ 11:50)  BP: 124/47 (10-08-21 @ 11:30)  RR: 14 (10-08-21 @ 11:30)  SpO2: 100% (10-08-21 @ 11:50)  Wt(kg): --    10-07 @ 07:01  -  10-08 @ 07:00  --------------------------------------------------------  IN: 1317.3 mL / OUT: 204 mL / NET: 1113.3 mL    10-08 @ 07:01  -  10-08 @ 12:21  --------------------------------------------------------  IN: 77.7 mL / OUT: 45 mL / NET: 32.7 mL      PHYSICAL EXAM:  Gen: Sedated  HEENT: +ETT  Cards: ning, +S1/S2, +TRINIDAD  Resp: +mechanical BS with bibasilar rales  GI: soft,   : +ibrahim  Extremities: no LE edema B/L  Derm: Rt foot wrapped with wound vac    LABS:  10-08    144  |  107  |  38<H>  ----------------------------<  93  3.8   |  26  |  3.03<H>    Ca    8.5      08 Oct 2021 03:52  Phos  6.4     10-08  Mg     2.4     10-08    TPro  6.9  /  Alb  3.1<L>  /  TBili  0.6  /  DBili      /  AST  1057<H>  /  ALT  493<H>  /  AlkPhos  99  10-08    Creatinine Trend: 3.03 <--, 2.12 <--, 2.02 <--, 2.11 <--, 2.17 <--, 2.13 <--, 1.70 <--, 1.49 <--                        8.7    10.30 )-----------( 244      ( 08 Oct 2021 03:52 )             27.1     Urine Studies:    Creatinine, Random Urine: 123 mg/dL (10-05 @ 12:43)  Chloride, Random Urine: <10 mmol/L (10-05 @ 12:43)  Sodium, Random Urine: 44 mmol/L (10-05 @ 12:43)

## 2021-10-08 NOTE — CONSULT NOTE ADULT - ASSESSMENT
75yo Male with Hx of HTN, HLD, CAD, s/p CABG, severe AS, PAD, s/p R partial 5th ray amputation (8/19/21) presented to [facility] ED on 9/16/21 with R foot pain and foul drainage a/w diabetic foot ulcer  (wound Cx grew Enterococcus, Aeromonas, Klebsiella, Group B Strep 9/16), acute osteomyelitis,  s/p OR debridement, bone biopsy, wound vac on 9/22/21, OREN, and acute hypoxic respiratory failure due to pulmonary edema in setting of severe AS + aspirational PNA, as well as pleural effusions, required intubation on 10/7, became hemodynamically unstable, requiring pressor support since 10/7, was consulted for acute, severe, hepatocellular liver injury, with intact function and cholestatic parameters. Likely due to ischemic injury.        75yo Male with Hx of HTN, HLD, CAD, s/p CABG, severe AS, PAD, s/p R partial 5th ray amputation (8/19/21) presented to [facility] ED on 9/16/21 with R foot pain and foul drainage a/w diabetic foot ulcer  (wound Cx grew Enterococcus, Aeromonas, Klebsiella, Group B Strep 9/16), acute osteomyelitis,  s/p OR debridement, bone biopsy, wound vac on 9/22/21, OREN, and acute hypoxic respiratory failure due to pulmonary edema in setting of severe AS + aspirational PNA, as well as pleural effusions, required intubation on 10/7, became hemodynamically unstable, requiring pressor support since 10/7, was consulted for acute, severe, hepatocellular liver injury, with intact function and cholestatic parameters. Likely due to ischemic injury.     - C/w close monitoring of LFTs, including INR  - The sudden increase of transaminases parallel his hemodynamic instability, suggests ischemic injury  - Been on fluconazole since 9/23, liver injury usually would be typically hepatocellular and would occur in the first few weeks of therapy, thus agree holding it and f/u with ID; no rash, no eosinophilia, no fever   - Agree switching antibiotics, been on levofloxacin 9/19-10/8, levofloxacin induced liver injury usually has short latency 1-3 weeks, and frequently occurs with jaundice, thus this case still rather consistent with ischemic injury, however cannot fully rule out DILI  - Avoid hepatotoxic medications when medically feasible  - C/w supportive measures per ICU  - C/w antibiotics per ID  - Can send Hep viral panel    Thank you for the consult  Will continue to follow. Hepatology service returns Monday. Please, contact GI on call if change in status, questions, concerns.  Discussed with ICU team. 77yo Male with Hx of HTN, HLD, CAD, s/p CABG, severe AS, PAD, s/p R partial 5th ray amputation (8/19/21) presented to [facility] ED on 9/16/21 with R foot pain and foul drainage a/w diabetic foot ulcer  (wound Cx grew Enterococcus, Aeromonas, Klebsiella, Group B Strep 9/16), acute osteomyelitis,  s/p OR debridement, bone biopsy, wound vac on 9/22/21, OREN, and acute hypoxic respiratory failure due to pulmonary edema in setting of severe AS + aspirational PNA, as well as pleural effusions, required intubation on 10/7, became hemodynamically unstable, requiring pressor support since 10/7, was consulted for acute, severe, hepatocellular liver injury, with intact function and cholestatic parameters. Likely due to ischemic injury.     - C/w close monitoring of LFTs, including INR  - The sudden increase of transaminases parallel his hemodynamic instability, suggests ischemic injury  - Been on fluconazole since 9/23, liver injury usually would be typically hepatocellular and would occur in the first few weeks of therapy, thus agree holding it and f/u with ID; no rash, no eosinophilia, no fever   - Agree switching antibiotics, been on levofloxacin 9/19-10/8, levofloxacin induced liver injury usually has short latency 1-3 weeks, and frequently occurs with jaundice, thus this case still rather consistent with ischemic injury, however cannot fully rule out DILI  - Avoid hepatotoxic medications when medically feasible  - C/w supportive measures per ICU  - C/w antibiotics per ID  - Can send Hep viral panel    - Cholelithiasis and distended GB on prior US 9/28, was no evidence of acute cholecystitis, was seen by GI, now mostly hepatocellular elevation with normal cholestatic parameters  - Consider repeat RUQ US    - On AXR 10/6 concern joey for pneumoperitoneum - f/u radiology recommendations for additional imaging    Thank you for the consult  Will continue to follow. Hepatology service returns Monday. Please, contact GI on call if change in status, questions, concerns.  Discussed with ICU team.

## 2021-10-08 NOTE — PROGRESS NOTE ADULT - ASSESSMENT
Assessment and Recommendation:   · Assessment	  Assessment:  - Acute Hypoxic Respiratory Failure  - Sepsis 2/2 to Aspiration PNA vs R foot OM   - OREN superimposed on CKD  - Severe AS   - CAD s/p CABG   - Cholelithiasis   - HTN  - HLD    Plan:  Neuro:  - Was on Mirtazapine; will hold   - On propofol, 25mcg,   -will add fentanyl drip for pain and decrease prop  -Versed, precedex as needed    CVS:  # Severe Aortic Stenosis   - Echo 8/15  confirmed EF 55-60%, Severe LVH, g1 diastolic dysfunction, and severe AS   -Murmurs on exam, systolic  -Cardiology on board SABIHA once stable    # combined systolic and diastolic heart failure  -CT Chest with pulm edema bilaterally; moderate to large pleural effusions; atelectasis vs PNA   - Echo 8/15  confirmed EF 55-60%, Severe LVH, g1 diastolic dysfunction, and severe AS   -new 10/8/2021  EF 40%  - BNP 19K with worsening overload as above; 9K today pt with poor cardiac reserve worsened by acute infection   - CXR  this admission showing improved R sided pleural effusion with diuresis  - Avoid drugs which increase afterload   -40IV lasix Q6 will switch to once day and transition to 20 daily once net even      #CAD   - S/p CABG, on ASA   - Continue Toprol 100 Qd, Procardia 60XL  - C/w Lasix 40 IV mg Q6h, Albumin 100mg   - Positive trop 1.46->1.550; likely due to increased demand and renal failure.  -Now down trended 1.05  - No active chest pain   - Echo 10/8/2021 EF 40%  - Will need a SABIHA and R+L heart cath once medically optimized  - C/w ASA, statin  - Cardiology Dr. Wilkins     #hypotension  Likely in the setting of intrapulmonary pressures,  CXR looks worse, with pleural effusion and increased vascular marking  Midodrine 5mg stat given 10/8/2021 followed by stress dose, followed by pheny  Intubated   Overnight and this morning MAP 70 on Pheny of 0.3 despite propofol   c/w Albumin   c/w with mechanical ventilation until chest tune and lung function is improved  Hold all BP meds  Switch to levophed due to recent echo        Pulm:  - Acute Hypoxic/ Hypercapnic Resp Failure 2/2  1. RLL PNA vs  2. Right pleural effusions vs  3. CHF    - Patient on Pendant, saturating 90% on 10L pendant, but having increased WOB, tachypnea   - Possible left lower lobe pneumonia and bilateral pleural effusions on CT Chest   - ABG was showing 7.49/33/52/25-> respiratory alkalosis with hypoxic   -ICU  Bipap 10/5 for hypoxia and work of breathing,  -pt also had CABG hx and BNP 19k - 9K this am=dmission   CT Surgery consulted for possible thoracentesis vs Chest tube unable to find pocket  -IR consulted for CT guided pig tail- post poned yesterday due to low blood pressure   - patient and family understands need fpr intubation if he worsens (FULL CODE)  -was intubated yesterday  -chest tube today  - Send fluid for tests/ cultures LDH, cytology..  -c/w lasix 40IV daily  -c/w with abx  - avoid fluid overloading     ID:  - Empiric Aspiration PNA coverage, R foot osteomyelitis, and Funguria   - Blood Cultures -ve till date, Bone and wound culture: showing Aeromonas, GBS, Klebsiella, Enterococcus   -Cultures from 9/22/2021, tissue and blood 9/17 negative  - UCX showing yeast - one source  - On Unasyn, Levaquin, Diflucan; renally dosed - all >12 days  -DC diflucan as it is only one source, also given today's LFTs  -DC levaquin  -Start vanc and faustino    Nephro:  - Ibrahim placed for possible obstruction   - OREN on CKD stage III: ATN was resolving, however renal fxn now worsened.   - Kidney/ Bladder US with large distended bladder s/p ibrahim placement on 9/23, then removed.   - S/p bladder scan 10/4 with 1L urine for which Ibrahim was reinserted  - FeUrea: 20.5% 9/23:   -Cr 2.02 down trended then 2.12, 3.02 BUN/ cr ratio 12 today most likely ATN from Hypotension  - Less likely due to obstructive uropathy.  No peripheral eosinophilia- no signs of AIN.   - Avoid nephrotoxins/ NSAIDs/ RCA. Monitor BMP.  - Nephrology Consulted Dr. Rodriguez     GI:  - NPO until off bipap  -RLL likely aspiration  - Had an Aspiration event after NG tube placed and subsequently removed by pt  - Aspiration precautions, speech and swallow recs, based on cineesophagogram  - Cholelithiasis noted on RUQ US   - Patient is having 2 lose stools daily  -cineesophagogram done, speech and swallow recommend puree with thin liquids,   -however patient is now intubated   -dc senna  -tube feeds after procedure    Heme:  - no indication for transfusion currently  - admitted with Hb 12.7 in August; now >8 stable  - No active bleeding, normocytic   - Fe panel shows deficiency; will hold repletion during acute infection  - Likely component of ACD, CKD  - Tranfuse if Hb<7 or if worsening tachy/HF sx     Endo:  - target CBG < 180 controlled NPO today  -feed after chest tube  - C/w Sliding Scale, accuchecks ACHS     Prophy:  - HSQ held for chest tube  - PPI Qd     Dispo:  - monitor in the ICU until off bipap  PT FULL CODE - confirmed w/ Daughter Mrs. Arabella Oliva

## 2021-10-08 NOTE — PROGRESS NOTE ADULT - ATTENDING COMMENTS
patient with worsening respiratory failure - evaluated right chest for pocket of fluid for placement of drain.   large pocket found - 14 Fr. gris catheter placed without issue.   drained 1000cc immediately and clamped. cxr pending

## 2021-10-08 NOTE — CONSULT NOTE ADULT - PROVIDER SPECIALTY LIST ADULT
Infectious Disease
Thoracic Surgery
Nephrology
Cardiology
Critical Care
Podiatry
Pulmonology
Critical Care
Intervent Radiology
Surgery
Gastroenterology
Hepatology
Pain Medicine

## 2021-10-08 NOTE — PROGRESS NOTE ADULT - ASSESSMENT
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 s/p OR debridement today. Nephrology consulted for abrupt rise in SCr.     1. OREN- likely ATN in the setting of infection and ACEi use. Lisinopril discontinued 9/22. ATN was resolving, however renal fxn now worsened. s/p CT chest with b/l mod to large pleural effusion R>L. Awaiting ?IR rt pigtail catheter.  s/p albumin assisted diuresis. Pt for Lasix 40mg IV x1 today as per ICU. Pt now oliguric with worsening renal function; likely ATN. Repeat Urine lytes; wong check FeUrea.   Kidney/ Bladder US with large distended bladder s/p ibrahim placement on 9/23, then removed. s/p bladder scan 10/4 with 1L urine for which ibrahim was reinserted; however; renal function did not improve post ibrahim; less likely due to obstructive uropathy.  No peripheral eosinophilia- no signs of AIN. C3 & C4 wnl with neg ASO- no signs of PIGN. Strict I/Os. Avoid nephrotoxins/ NSAIDs/ RCA. Monitor BMP.    2. 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 Will defer secondary w/u as an outpt. Avoid nephrotoxins  3. HTN 2/2 CKD- Now on pressors post intubation/ on propofol. Pressors as per ICU. Monitor BP  4. Acute osteomyelitis- s/p debridement 9/22. Pt now on Vanco and meropenem (renally dosed). Monitor Vanco trough. Plan as per ID      [facility] NEPHROLOGY  Bryn Johnson M.D.  Domingo Booth D.O.  Zakia Rodriguez M.D.  Zonia Adams, MSN, ANP-C  [phone]    [address] [place] [address]

## 2021-10-09 LAB
ALBUMIN SERPL ELPH-MCNC: 2.2 G/DL — LOW (ref 3.5–5)
ALP SERPL-CCNC: 92 U/L — SIGNIFICANT CHANGE UP (ref 40–120)
ALT FLD-CCNC: 479 U/L DA — HIGH (ref 10–60)
ANION GAP SERPL CALC-SCNC: 10 MMOL/L — SIGNIFICANT CHANGE UP (ref 5–17)
APTT BLD: 115.4 SEC — HIGH (ref 27.5–35.5)
APTT BLD: 29.6 SEC — SIGNIFICANT CHANGE UP (ref 27.5–35.5)
AST SERPL-CCNC: 525 U/L — HIGH (ref 10–40)
BASE EXCESS BLDA CALC-SCNC: 3.5 MMOL/L — HIGH (ref -2–3)
BASE EXCESS BLDV CALC-SCNC: 3.8 MMOL/L — SIGNIFICANT CHANGE UP
BASOPHILS # BLD AUTO: 0.03 K/UL — SIGNIFICANT CHANGE UP (ref 0–0.2)
BASOPHILS NFR BLD AUTO: 0.4 % — SIGNIFICANT CHANGE UP (ref 0–2)
BILIRUB SERPL-MCNC: 0.6 MG/DL — SIGNIFICANT CHANGE UP (ref 0.2–1.2)
BLOOD GAS COMMENTS ARTERIAL: SIGNIFICANT CHANGE UP
BUN SERPL-MCNC: 34 MG/DL — HIGH (ref 7–18)
CALCIUM SERPL-MCNC: 8 MG/DL — LOW (ref 8.4–10.5)
CHLORIDE SERPL-SCNC: 109 MMOL/L — HIGH (ref 96–108)
CO2 SERPL-SCNC: 28 MMOL/L — SIGNIFICANT CHANGE UP (ref 22–31)
CREAT SERPL-MCNC: 2.45 MG/DL — HIGH (ref 0.5–1.3)
EOSINOPHIL # BLD AUTO: 0.13 K/UL — SIGNIFICANT CHANGE UP (ref 0–0.5)
EOSINOPHIL NFR BLD AUTO: 1.8 % — SIGNIFICANT CHANGE UP (ref 0–6)
GLUCOSE SERPL-MCNC: 85 MG/DL — SIGNIFICANT CHANGE UP (ref 70–99)
HCO3 BLDA-SCNC: 28 MMOL/L — SIGNIFICANT CHANGE UP (ref 21–28)
HCO3 BLDV-SCNC: 29 MMOL/L — SIGNIFICANT CHANGE UP (ref 22–29)
HCT VFR BLD CALC: 24.7 % — LOW (ref 39–50)
HCT VFR BLD CALC: 26.3 % — LOW (ref 39–50)
HCT VFR BLD CALC: 26.8 % — LOW (ref 39–50)
HGB BLD-MCNC: 7.8 G/DL — LOW (ref 13–17)
HGB BLD-MCNC: 8.4 G/DL — LOW (ref 13–17)
HGB BLD-MCNC: 8.5 G/DL — LOW (ref 13–17)
HOROWITZ INDEX BLDA+IHG-RTO: 60 — SIGNIFICANT CHANGE UP
HOROWITZ INDEX BLDV+IHG-RTO: 21 — SIGNIFICANT CHANGE UP
IMM GRANULOCYTES NFR BLD AUTO: 0.5 % — SIGNIFICANT CHANGE UP (ref 0–1.5)
LDH SERPL L TO P-CCNC: 447 U/L — HIGH (ref 120–225)
LYMPHOCYTES # BLD AUTO: 1.08 K/UL — SIGNIFICANT CHANGE UP (ref 1–3.3)
LYMPHOCYTES # BLD AUTO: 14.6 % — SIGNIFICANT CHANGE UP (ref 13–44)
MAGNESIUM SERPL-MCNC: 2 MG/DL — SIGNIFICANT CHANGE UP (ref 1.6–2.6)
MCHC RBC-ENTMCNC: 29.2 PG — SIGNIFICANT CHANGE UP (ref 27–34)
MCHC RBC-ENTMCNC: 29.6 PG — SIGNIFICANT CHANGE UP (ref 27–34)
MCHC RBC-ENTMCNC: 29.7 PG — SIGNIFICANT CHANGE UP (ref 27–34)
MCHC RBC-ENTMCNC: 31.6 GM/DL — LOW (ref 32–36)
MCHC RBC-ENTMCNC: 31.7 GM/DL — LOW (ref 32–36)
MCHC RBC-ENTMCNC: 31.9 GM/DL — LOW (ref 32–36)
MCV RBC AUTO: 92.5 FL — SIGNIFICANT CHANGE UP (ref 80–100)
MCV RBC AUTO: 92.9 FL — SIGNIFICANT CHANGE UP (ref 80–100)
MCV RBC AUTO: 93.4 FL — SIGNIFICANT CHANGE UP (ref 80–100)
MONOCYTES # BLD AUTO: 0.33 K/UL — SIGNIFICANT CHANGE UP (ref 0–0.9)
MONOCYTES NFR BLD AUTO: 4.4 % — SIGNIFICANT CHANGE UP (ref 2–14)
NEUTROPHILS # BLD AUTO: 5.81 K/UL — SIGNIFICANT CHANGE UP (ref 1.8–7.4)
NEUTROPHILS NFR BLD AUTO: 78.3 % — HIGH (ref 43–77)
NRBC # BLD: 0 /100 WBCS — SIGNIFICANT CHANGE UP (ref 0–0)
PCO2 BLDA: 40 MMHG — SIGNIFICANT CHANGE UP (ref 35–48)
PCO2 BLDV: 46 MMHG — SIGNIFICANT CHANGE UP (ref 42–55)
PH BLDA: 7.45 — SIGNIFICANT CHANGE UP (ref 7.35–7.45)
PH BLDV: 7.41 — SIGNIFICANT CHANGE UP (ref 7.32–7.43)
PHOSPHATE SERPL-MCNC: 4 MG/DL — SIGNIFICANT CHANGE UP (ref 2.5–4.5)
PLATELET # BLD AUTO: 162 K/UL — SIGNIFICANT CHANGE UP (ref 150–400)
PLATELET # BLD AUTO: 175 K/UL — SIGNIFICANT CHANGE UP (ref 150–400)
PLATELET # BLD AUTO: 178 K/UL — SIGNIFICANT CHANGE UP (ref 150–400)
PO2 BLDA: 227 MMHG — HIGH (ref 83–108)
PO2 BLDV: 41 MMHG — SIGNIFICANT CHANGE UP
POTASSIUM SERPL-MCNC: 3 MMOL/L — LOW (ref 3.5–5.3)
POTASSIUM SERPL-SCNC: 3 MMOL/L — LOW (ref 3.5–5.3)
PROT SERPL-MCNC: 5.8 G/DL — LOW (ref 6–8.3)
RBC # BLD: 2.67 M/UL — LOW (ref 4.2–5.8)
RBC # BLD: 2.83 M/UL — LOW (ref 4.2–5.8)
RBC # BLD: 2.87 M/UL — LOW (ref 4.2–5.8)
RBC # FLD: 14.9 % — HIGH (ref 10.3–14.5)
RBC # FLD: 15 % — HIGH (ref 10.3–14.5)
RBC # FLD: 15 % — HIGH (ref 10.3–14.5)
SAO2 % BLDA: 100 % — SIGNIFICANT CHANGE UP
SAO2 % BLDV: 69.1 % — SIGNIFICANT CHANGE UP
SODIUM SERPL-SCNC: 147 MMOL/L — HIGH (ref 135–145)
TROPONIN I SERPL-MCNC: 0.22 NG/ML — HIGH (ref 0–0.04)
VANCOMYCIN TROUGH SERPL-MCNC: 20.3 UG/ML — HIGH (ref 10–20)
WBC # BLD: 7.42 K/UL — SIGNIFICANT CHANGE UP (ref 3.8–10.5)
WBC # BLD: 8 K/UL — SIGNIFICANT CHANGE UP (ref 3.8–10.5)
WBC # BLD: 8.66 K/UL — SIGNIFICANT CHANGE UP (ref 3.8–10.5)
WBC # FLD AUTO: 7.42 K/UL — SIGNIFICANT CHANGE UP (ref 3.8–10.5)
WBC # FLD AUTO: 8 K/UL — SIGNIFICANT CHANGE UP (ref 3.8–10.5)
WBC # FLD AUTO: 8.66 K/UL — SIGNIFICANT CHANGE UP (ref 3.8–10.5)

## 2021-10-09 PROCEDURE — 93010 ELECTROCARDIOGRAM REPORT: CPT

## 2021-10-09 PROCEDURE — 99231 SBSQ HOSP IP/OBS SF/LOW 25: CPT

## 2021-10-09 PROCEDURE — 71045 X-RAY EXAM CHEST 1 VIEW: CPT | Mod: 26

## 2021-10-09 RX ORDER — PHENYLEPHRINE HYDROCHLORIDE 10 MG/ML
0.5 INJECTION INTRAVENOUS
Qty: 160 | Refills: 0 | Status: DISCONTINUED | OUTPATIENT
Start: 2021-10-09 | End: 2021-10-11

## 2021-10-09 RX ORDER — ASPIRIN/CALCIUM CARB/MAGNESIUM 324 MG
81 TABLET ORAL DAILY
Refills: 0 | Status: DISCONTINUED | OUTPATIENT
Start: 2021-10-09 | End: 2021-10-11

## 2021-10-09 RX ORDER — HEPARIN SODIUM 5000 [USP'U]/ML
INJECTION INTRAVENOUS; SUBCUTANEOUS
Qty: 25000 | Refills: 0 | Status: DISCONTINUED | OUTPATIENT
Start: 2021-10-09 | End: 2021-10-09

## 2021-10-09 RX ORDER — ENOXAPARIN SODIUM 100 MG/ML
90 INJECTION SUBCUTANEOUS
Refills: 0 | Status: DISCONTINUED | OUTPATIENT
Start: 2021-10-09 | End: 2021-10-09

## 2021-10-09 RX ORDER — POTASSIUM CHLORIDE 20 MEQ
40 PACKET (EA) ORAL ONCE
Refills: 0 | Status: COMPLETED | OUTPATIENT
Start: 2021-10-09 | End: 2021-10-09

## 2021-10-09 RX ORDER — CHLORHEXIDINE GLUCONATE 213 G/1000ML
1 SOLUTION TOPICAL
Refills: 0 | Status: DISCONTINUED | OUTPATIENT
Start: 2021-10-09 | End: 2021-10-09

## 2021-10-09 RX ORDER — HEPARIN SODIUM 5000 [USP'U]/ML
INJECTION INTRAVENOUS; SUBCUTANEOUS
Qty: 25000 | Refills: 0 | Status: DISCONTINUED | OUTPATIENT
Start: 2021-10-09 | End: 2021-10-13

## 2021-10-09 RX ADMIN — PROPOFOL 15.6 MICROGRAM(S)/KG/MIN: 10 INJECTION, EMULSION INTRAVENOUS at 05:10

## 2021-10-09 RX ADMIN — HEPARIN SODIUM 1400 UNIT(S)/HR: 5000 INJECTION INTRAVENOUS; SUBCUTANEOUS at 18:50

## 2021-10-09 RX ADMIN — PROPOFOL 15.6 MICROGRAM(S)/KG/MIN: 10 INJECTION, EMULSION INTRAVENOUS at 20:31

## 2021-10-09 RX ADMIN — Medication 81 MILLIGRAM(S): at 12:21

## 2021-10-09 RX ADMIN — Medication 325 MILLIGRAM(S): at 12:21

## 2021-10-09 RX ADMIN — PROPOFOL 15.6 MICROGRAM(S)/KG/MIN: 10 INJECTION, EMULSION INTRAVENOUS at 12:40

## 2021-10-09 RX ADMIN — Medication 40 MILLIEQUIVALENT(S): at 08:45

## 2021-10-09 RX ADMIN — PANTOPRAZOLE SODIUM 40 MILLIGRAM(S): 20 TABLET, DELAYED RELEASE ORAL at 22:30

## 2021-10-09 RX ADMIN — MEROPENEM 100 MILLIGRAM(S): 1 INJECTION INTRAVENOUS at 05:08

## 2021-10-09 RX ADMIN — FINASTERIDE 5 MILLIGRAM(S): 5 TABLET, FILM COATED ORAL at 12:21

## 2021-10-09 RX ADMIN — ATORVASTATIN CALCIUM 80 MILLIGRAM(S): 80 TABLET, FILM COATED ORAL at 22:30

## 2021-10-09 RX ADMIN — PHENYLEPHRINE HYDROCHLORIDE 8.15 MICROGRAM(S)/KG/MIN: 10 INJECTION INTRAVENOUS at 09:28

## 2021-10-09 RX ADMIN — HEPARIN SODIUM 1600 UNIT(S)/HR: 5000 INJECTION INTRAVENOUS; SUBCUTANEOUS at 10:10

## 2021-10-09 RX ADMIN — Medication 166.67 MILLIGRAM(S): at 12:22

## 2021-10-09 RX ADMIN — TAMSULOSIN HYDROCHLORIDE 0.4 MILLIGRAM(S): 0.4 CAPSULE ORAL at 22:30

## 2021-10-09 RX ADMIN — CHLORHEXIDINE GLUCONATE 15 MILLILITER(S): 213 SOLUTION TOPICAL at 17:04

## 2021-10-09 RX ADMIN — PROPOFOL 15.6 MICROGRAM(S)/KG/MIN: 10 INJECTION, EMULSION INTRAVENOUS at 00:41

## 2021-10-09 RX ADMIN — CHLORHEXIDINE GLUCONATE 15 MILLILITER(S): 213 SOLUTION TOPICAL at 05:09

## 2021-10-09 RX ADMIN — HEPARIN SODIUM 1600 UNIT(S)/HR: 5000 INJECTION INTRAVENOUS; SUBCUTANEOUS at 12:34

## 2021-10-09 RX ADMIN — PREGABALIN 1000 MICROGRAM(S): 225 CAPSULE ORAL at 12:25

## 2021-10-09 RX ADMIN — CHLORHEXIDINE GLUCONATE 1 APPLICATION(S): 213 SOLUTION TOPICAL at 05:09

## 2021-10-09 RX ADMIN — MEROPENEM 100 MILLIGRAM(S): 1 INJECTION INTRAVENOUS at 17:03

## 2021-10-09 NOTE — PROGRESS NOTE ADULT - SUBJECTIVE AND OBJECTIVE BOX
Patient is a 76y old  Male who presents with a chief complaint of R Foot Pain (09 Oct 2021 03:52)    PATIENT IS SEEN AND EXAMINED IN MEDICAL FLOOR.  JOCELYN [    ]    MADDY [   ]      GT [   ]    ALLERGIES:  No Known Allergies      Daily     Daily Weight in k.8 (09 Oct 2021 07:00)    VITALS:    Vital Signs Last 24 Hrs  T(C): 36.6 (09 Oct 2021 09:00), Max: 37 (08 Oct 2021 23:00)  T(F): 97.9 (09 Oct 2021 09:00), Max: 98.6 (08 Oct 2021 23:00)  HR: 68 (09 Oct 2021 11:00) (52 - 137)  BP: 110/51 (09 Oct 2021 11:00) (78/58 - 139/55)  BP(mean): 65 (09 Oct 2021 11:00) (52 - 101)  RR: 14 (09 Oct 2021 11:00) (11 - 23)  SpO2: 98% (09 Oct 2021 11:00) (80% - 100%)    LABS:    CBC Full  -  ( 09 Oct 2021 04:06 )  WBC Count : 7.42 K/uL  RBC Count : 2.67 M/uL  Hemoglobin : 7.8 g/dL  Hematocrit : 24.7 %  Platelet Count - Automated : 162 K/uL  Mean Cell Volume : 92.5 fl  Mean Cell Hemoglobin : 29.2 pg  Mean Cell Hemoglobin Concentration : 31.6 gm/dL  Auto Neutrophil # : 5.81 K/uL  Auto Lymphocyte # : 1.08 K/uL  Auto Monocyte # : 0.33 K/uL  Auto Eosinophil # : 0.13 K/uL  Auto Basophil # : 0.03 K/uL  Auto Neutrophil % : 78.3 %  Auto Lymphocyte % : 14.6 %  Auto Monocyte % : 4.4 %  Auto Eosinophil % : 1.8 %  Auto Basophil % : 0.4 %    PTT - ( 09 Oct 2021 09:55 )  PTT:29.6 sec  10-    147<H>  |  109<H>  |  34<H>  ----------------------------<  85  3.0<L>   |  28  |  2.45<H>    Ca    8.0<L>      09 Oct 2021 04:06  Phos  4.0     10-09  Mg     2.0     10-09    TPro  5.8<L>  /  Alb  2.2<L>  /  TBili  0.6  /  DBili  x   /  AST  525<H>  /  ALT  479<H>  /  AlkPhos  92  10-09    CAPILLARY BLOOD GLUCOSE      POCT Blood Glucose.: 97 mg/dL (09 Oct 2021 10:50)  POCT Blood Glucose.: 98 mg/dL (09 Oct 2021 07:13)  POCT Blood Glucose.: 94 mg/dL (08 Oct 2021 21:40)  POCT Blood Glucose.: 99 mg/dL (08 Oct 2021 17:24)  POCT Blood Glucose.: 98 mg/dL (08 Oct 2021 11:05)    CARDIAC MARKERS ( 09 Oct 2021 04:06 )  0.297 ng/mL / x     / 281 U/L / x     / <1.0 ng/mL      LIVER FUNCTIONS - ( 09 Oct 2021 04:06 )  Alb: 2.2 g/dL / Pro: 5.8 g/dL / ALK PHOS: 92 U/L / ALT: 479 U/L DA / AST: 525 U/L / GGT: x           Creatinine Trend: 2.45<--, 3.03<--, 2.12<--, 2.02<--, 2.11<--, 2.17<--  I&O's Summary    08 Oct 2021 07:01  -  09 Oct 2021 07:00  --------------------------------------------------------  IN: 832.3 mL / OUT: 3595 mL / NET: -2762.7 mL    09 Oct 2021 07:01  -  09 Oct 2021 11:03  --------------------------------------------------------  IN: 144.9 mL / OUT: 170 mL / NET: -25.1 mL        ABG - ( 09 Oct 2021 04:01 )  pH, Arterial: 7.45  pH, Blood: x     /  pCO2: 40    /  pO2: 227   / HCO3: 28    / Base Excess: 3.5   /  SaO2: 100                 .Body Fluid Pleural Fluid  10-08 @ 18:51 --  --  --      .Body Fluid Pleural Fluid  10-08 @ 18:34 --  --    polymorphonuclear leukocytes seen  No organisms seen  by cytocentrifuge      .Tissue Right 5th toe r/o osteomyeltis   @ 13:54   No growth at 5 days  --    No polymorphonuclear cells seen per low power field  No organisms seen per oil power field      .Blood Blood-Venous   @ 10:28   No Growth Final  --  --      .Surgical Swab right foot wound   @ 21:42   Culture yields >4 types of aerobic and/or anaerobic bacteria  Call client services within 7 days if further workup is clinically  indicated. Culture includes  Rare Aeromonas hydrophila/caviae  Few Klebsiella oxytoca/Raoutella ornithinolytica  Few Enterococcus faecalis  Few Streptococcus agalactiae (Group B) isolated  Group B streptococci are susceptible to ampicillin,  penicillin and cefazolin, but may be resistant to  erythromycin and clindamycin.  Recommendations for intrapartum prophylaxis for Group B  streptococci are penicillin or ampicillin.  --  Aeromonas hydrophila/caviae  Klebsiella oxytoca /Raoutella ornithinolytica  Enterococcus faecalis      Bone R 5th metatarsal bone clean ma   @ 03:59   No growth at 5 days  --    No polymorphonuclear leukocytes seen per low power field  No organisms seen per oil power field      .Blood Blood-Venous   @ 21:09   No Growth Final  --  --      .Surgical Swab Right foot plantar wound   @ 21:08   Moderate Streptococcus agalactiae (Group B) isolated  Group B streptococci are susceptible to ampicillin,  penicillin and cefazolin, but may be resistant to  erythromycin and clindamycin.  Recommendations for intrapartum prophylaxis for Group B  streptococci are penicillin or ampicillin.  Moderate Bacteroides fragilis "Susceptibilities not performed"  Normal skin filiberto isolated  --  --          MEDICATIONS:    MEDICATIONS  (STANDING):  aspirin  chewable 81 milliGRAM(s) Oral daily  atorvastatin 80 milliGRAM(s) Oral at bedtime  chlorhexidine 0.12% Liquid 15 milliLiter(s) Oral Mucosa every 12 hours  chlorhexidine 2% Cloths 1 Application(s) Topical <User Schedule>  cyanocobalamin 1000 MICROGram(s) Oral daily  ergocalciferol 42291 Unit(s) Oral <User Schedule>  fentaNYL   Infusion 0.5 MICROgram(s)/kG/Hr (4.35 mL/Hr) IV Continuous <Continuous>  ferrous    sulfate 325 milliGRAM(s) Oral daily  finasteride 5 milliGRAM(s) Oral daily  insulin lispro (ADMELOG) corrective regimen sliding scale   SubCutaneous Before meals and at bedtime  meropenem  IVPB 500 milliGRAM(s) IV Intermittent every 12 hours  pantoprazole  Injectable 40 milliGRAM(s) IV Push at bedtime  phenylephrine    Infusion 0.5 MICROgram(s)/kG/Min (8.15 mL/Hr) IV Continuous <Continuous>  propofol Infusion 30 MICROgram(s)/kG/Min (15.6 mL/Hr) IV Continuous <Continuous>  tamsulosin 0.4 milliGRAM(s) Oral at bedtime  vancomycin  IVPB 1250 milliGRAM(s) IV Intermittent daily      MEDICATIONS  (PRN):  ondansetron Injectable 4 milliGRAM(s) IV Push three times a day PRN Nausea and/or Vomiting  sodium chloride 0.9% lock flush 10 milliLiter(s) IV Push every 1 hour PRN Pre/post blood products, medications, blood draw, and to maintain line patency      REVIEW OF SYSTEMS:                           ALL ROS DONE [ X   ]    CONSTITUTIONAL:  LETHARGIC [   ], FEVER [   ], UNRESPONSIVE [   ]  CVS:  CP  [   ], SOB, [   ], PALPITATIONS [   ], DIZZYNESS [   ]  RS: COUGH [   ], SPUTUM [   ]  GI: ABDOMINAL PAIN [   ], NAUSEA [   ], VOMITINGS [   ], DIARRHEA [   ], CONSTIPATION [   ]  :  DYSURIA [   ], NOCTURIA [   ], INCREASED FREQUENCY [   ], DRIBLING [   ],  SKELETAL: PAINFUL JOINTS [   ], SWOLLEN JOINTS [   ], NECK ACHE [   ], LOW BACK ACHE [   ],  SKIN : ULCERS [   ], RASH [   ], ITCHING [   ]  CNS: HEAD ACHE [   ], DOUBLE VISION [   ], BLURRED VISION [   ], AMS / CONFUSION [   ], SEIZURES [   ], WEAKNESS [   ],TINGLING / NUMBNESS [   ]    PHYSICAL EXAMINATION:  GENERAL APPEARANCE: NO DISTRESS  HEENT:  NO PALLOR, NO  JVD,  NO   NODES, NECK SUPPLE  CVS: S1 +, S2 +,   RS: AEEB,  OCCASIONAL  RALES +,   NO RONCHI, CRACKLES +   ET+  ABD: SOFT, NT, NO, BS +       EXT: NO PE  SKIN: WARM,   RIGHT FOOT WRAPPED W/ WOUND VAC IN PLACE   ,   CVC+  ,   CT + [RIGH] ATTACHED TO PLEUROVAC  SKELETAL:  ROM ACCEPTABLE  CNS:  AAO X  0    RADIOLOGY :    EXAM:  CT ABDOMEN AND PELVIS OC                            PROCEDURE DATE:  2021          INTERPRETATION:  CLINICAL INFORMATION: Small bowel obstruction.    COMPARISON: None.    CONTRAST/COMPLICATIONS:  IV Contrast: NONE  Oral Contrast: Omnipaque 300  Complications: None reported at time of study completion    PROCEDURE:  CT of the Abdomen and Pelvis was performed.  Sagittal and coronal reformats were performed.    FINDINGS:  LOWER CHEST: Small bilateral pleural effusions with compressiveatelectasis of both lower lobes.    LIVER: Within normal limits.  BILE DUCTS: Normal caliber.  GALLBLADDER: Distended. Small layering gallstones.  SPLEEN: Within normal limits.  PANCREAS: Within normal limits.  ADRENALS: Within normal limits.  KIDNEYS/URETERS: No hydronephrosis. Nonobstructing left upper pole calculus, measuring 0.6 cm.    BLADDER: Urinary bladder contains air and a Castañeda catheter balloon.  REPRODUCTIVE ORGANS: Prostate is not enlarged.    BOWEL: No bowel obstruction. Appendix isnormal. Colonic diverticulosis.  PERITONEUM: Mild presacral fluid.  VESSELS: Atherosclerotic changes.  RETROPERITONEUM/LYMPH NODES: No lymphadenopathy.  ABDOMINAL WALL: Within normal limits.  BONES: Degenerative changes. Sternotomy.    IMPRESSION:  No acute intra-abdominal pathology.    Small bilateral pleural effusions with compressive atelectasis of both lower lobes.    ====================================================    EXAM:  MR FOOT RT                            PROCEDURE DATE:  2021          INTERPRETATION:  Clinical Information: Recent fifth metatarsal head resection now with fifth digit pain, swelling and foul discharge.    Comparison: Radiographs of the right foot from 2021 and MRI the right foot from 2021.    Technique:  MRI of the right midfoot and forefoot.  Intravenous Contrast: None.    Findings:    There is a resection at the mid aspect of the fifth metatarsal shaft. There is a lateral soft tissue wound beginning at the level of the amputation which extends distally. There is susceptibility artifact in the region of the amputation consistent with postoperative change. There is hyperintense T2 marrow signal throughout the remaining fifth metatarsal and within the adjacent fourth metatarsal head which is nonspecific and could be related to recent postoperative changes although osteomyelitis is suspected.    There is edema and mild atrophy within the plantar muscles of the foot. There is minimal spurring at the first metatarsophalangeal and hallux sesamoid articulations.    Impression:  Resection at the mid aspect of the fifth metatarsal. Lateral soft tissue wound with marrow signal abnormality throughout the fifth metatarsal and within the adjacent fourth metatarsal head which is nonspecific in the setting of recent surgery although osteomyelitis is suspected.        ASSESSMENT :     Headache    HTN (hypertension)    HLD (hyperlipidemia)    DM (diabetes mellitus)    CAD (coronary artery disease)    Glaucoma, angle-closure    [place] (hard of hearing)    No significant past surgical history    S/P CABG (coronary artery bypass graft)    History of thyroid surgery        PLAN:  HPI:  76M from home, lives with daughter w/ PMH DM on insulin, HTN, HLD, CAD, PSH R partial 5th ray amputation 2021 p/w R foot pain x1 day associated with foul smelling discharge. Pt with sharp pain on the R lateral foot. As per daughter, pt was taking tylenol which did not help his pain prompting the patient to ask his daughter to take him to the hospital. Pt is a poor historian. Daughter states that the patient did not see his podiatrist after the surgery. No fever, chest, pain, palpitations, nausea, vomiting, diarrhea (17 Sep 2021 01:11)    # TRANSFERRED TO ICU FOR WORSENING HYPOXIA AND DYSPNEA    # SUSPECT RIGHT FOOT OSTEOMYELITIS S/P RIGHT 5TH RAY RESECTION [] AND S/P DEBRIDEMENT, GRAFT APPLICATION, BONE BX AND WOUND VAC APPLICATION   ** RECENT ADMISSION FOR RIGHT DIABETIC FOOT ULCER, CELLULITIS, OSTEOMYELITIS RIGHT 5th METATARSAL S/P RIGHT 5TH PARTIAL RAY AMPUTATION [] - BONE PATHOLOGY W/ CLEAN MARGINS    - S/P VANCOMYCIN AND ZOSYN ; NOW ON UNASYN AND LEVAQUIN GIVEN OREN; PER ID SWITCH TO ZOSYN UNTIL 11/3  - RECENTLY HAD SIMON W/ MILD PAD  - REVIEWED WOUND CX [ ENTEROCOCCUS, AEROMONAS, KLEBSIELLA] AND BCX  - BONE BX - acute osteomyelitis and necrotic periosteal tissue.   - ID CONSULT, PODIATRY CONSULT    - PICC LINE PLACED TODAY TO COMPLETE 6 WEEKS OF ANTIBIOTICS - PER ID SWITCH TO ZOSYN UNTIL 11/3    # ACUTE HYPOXIC RESPIRATORY FAILURE SUSPECT S/T PULMONARY EDEMA IN THE SETTING OF SEVERE AORTIC STENOSIS + ASPIRATION PNA - S/P CT TUBE PLACEMENT [SET TO PLEUROVAC] - SWITCHED FROM LEVAQUIN TO VANCOMYCIN + MEROPENEM, LASIX, CARDIOLOGY CONSULT IN PROGRESS  - S/P CRITICAL CARE CONSULT  - S/P LASIX ON 10/1 - 10/2, 10/4 AND 10/5  - CT CHEST W/ BILATERAL PLEURAL EFFUSIONS [10/5] - PULMONOLOGY CONSULT FOR POSSIBLE THORACENTESIS & WILL CONTINUE LASIX   - ALSO F/U REPEAT ECHOCARDIOGRAM  - CT SURGERY WAS CONSULTED BUT UNABLE TO PLACE PIGTAIL, THUS IR CONSULT IN PROGRESS FOR PIGTAIL PLACEMENT  - CHEST TUBE PLACED [10/8]  - NOTED REPEAT ECHO    # SHOCK - ? S/T HYPOVOLEMIA VS. SEPSIS - W/ CVC, ON VASOPRESSORS - SWITCHED TO VANCOMYCIN AND MEROPENEM  - F/U BCX, F/U UA  - ID CONSULT IN PROGRESS    # FUNGURIA - D/C FLUCONAZOLE GIVEN TRANSAMINITIS    # TRANSAMINITIS - SUSPECT THIS IS ISCHEMIC HEPATITIS - HEPATOLOGY CONSULT IN PROGRESS    # BOWEL DISTENTION - ? ILEUS - OPTIMIZING ELECTROLYTES - IMPROVED  - SURGERY CONSULT IN PROGRESS  - PASSED 2 BMS ON     # ASPIRATION EVENT WHEN PATIENT REMOVED NGT - ON LEVAQUIN, ID CONSULT IN PROGRESS    # CHOLELITHIASIS - TRENDING LFTS, RUQ U/S - CHOLELITHIASIS, SURGERY CONSULT IN PROGRESS  - GI CONSULT IN PROGRESS - LESS SUSPICIOUS FOR CHOLECYSTITIS    # DYSPEPSIA - PLACED ON PPI BID WITH IMPROVEMENT, UNDERWENT ST EVAL     # ELEVATED TROPONINS - NSTEMI - ? DEMAND - WILL NEED ISCHEMIC EVAL ONCE ACUTE INFECTION RESOLVES PER CARDIOLOGY, CARDIOLOGY CONSULT IN PROGRESS  - ON ASA, STATIN, BB    # OREN ON CKD - S/T ATN AND URINARY RETENTION - S/P IVF, NOW W/ CASTAÑEDA, NEPHROLOGY CONUSLT IN PROGRESS  - ON FLOMAX, ADDED FINASTERIDE    - WITH WORSENING RENAL FXN - NOTED URINARY RETENTION [10/4] - REPLACED CASTAÑEDA    # MEDICAL CLEARANCE FOR SURGERY - RCRI - 2 - 10.1 % 30 DAY RISK OF DEATH, MI OR CARDIAC ARREST  - CARDIOLOGY CONSULT     # DM -  HBA1C - 11  - LANTUS, SSI + FS    # CAD S/P CABG - PLACED ON ASA, STATIN, BB  - ECHO - SEVERE AORTIC STENOSIS, SEVERE CONCENTRIC LVH, G1DD, MILD LA  - CARDIOLOGY CONSULT - DR. BASSETT   - MAY NEED ISCHEMIC EVAL ONCE ACUTE ILLNESS RESOLVES    # HTN - ON METOPROLOL, NICARDIPINE    # SEVERE, ASYMPTOMATIC, STENOSIS - ONCE ACUTE ILLNESS RESOLVES, WILL LIKELY NEED ISCHEMIC WORKUP, SABIHA TO EVALUATE FURTHER. D/W PATIENT, DAUGHTER AND CARDIOLOGY TEAM [PREVIOUSLY SAW DR. BASSETT]    # VITAMIN D DEFICIENCY - ON CHOLECALCIFEROL    # HLD - STATIN    # CASE DISCUSSED AT LENGTH WITH PATIENT AND DAUGHTER, BIRDIE ROBERT AT BEDSIDE AND VIA PHONE @ [phone] [10/5] - CASE DICUSSED AT LENGTH AND ALL QUESTIONS WERE ANSWERED. DAUGHTER UNDERSTOOD THAT PROGNOSIS IS GUARDED.  # PATIENT AND DAUGHTER REFUSED [facility] ON PREVIOUS ADMISSION, PREVIOUSLY WISHED FOR PATIENT RETURN HOME W/ HOME PT; HOWEVER NOW, DAUGHTER WISHES FOR PATIENT TO GO TO [facility], AS PATIENT'S WIFE IS CURRENTLY ADMITTED THERE    # GI AND DVT PPX   Patient is a 76y old  Male who presents with a chief complaint of R Foot Pain (09 Oct 2021 03:52)    PATIENT IS SEEN AND EXAMINED IN MEDICAL FLOOR.    ALLERGIES:  No Known Allergies      Daily     Daily Weight in k.8 (09 Oct 2021 07:00)    VITALS:    Vital Signs Last 24 Hrs  T(C): 36.6 (09 Oct 2021 09:00), Max: 37 (08 Oct 2021 23:00)  T(F): 97.9 (09 Oct 2021 09:00), Max: 98.6 (08 Oct 2021 23:00)  HR: 68 (09 Oct 2021 11:00) (52 - 137)  BP: 110/51 (09 Oct 2021 11:00) (78/58 - 139/55)  BP(mean): 65 (09 Oct 2021 11:00) (52 - 101)  RR: 14 (09 Oct 2021 11:00) (11 - 23)  SpO2: 98% (09 Oct 2021 11:00) (80% - 100%)    LABS:    CBC Full  -  ( 09 Oct 2021 04:06 )  WBC Count : 7.42 K/uL  RBC Count : 2.67 M/uL  Hemoglobin : 7.8 g/dL  Hematocrit : 24.7 %  Platelet Count - Automated : 162 K/uL  Mean Cell Volume : 92.5 fl  Mean Cell Hemoglobin : 29.2 pg  Mean Cell Hemoglobin Concentration : 31.6 gm/dL  Auto Neutrophil # : 5.81 K/uL  Auto Lymphocyte # : 1.08 K/uL  Auto Monocyte # : 0.33 K/uL  Auto Eosinophil # : 0.13 K/uL  Auto Basophil # : 0.03 K/uL  Auto Neutrophil % : 78.3 %  Auto Lymphocyte % : 14.6 %  Auto Monocyte % : 4.4 %  Auto Eosinophil % : 1.8 %  Auto Basophil % : 0.4 %    PTT - ( 09 Oct 2021 09:55 )  PTT:29.6 sec  10-    147<H>  |  109<H>  |  34<H>  ----------------------------<  85  3.0<L>   |  28  |  2.45<H>    Ca    8.0<L>      09 Oct 2021 04:06  Phos  4.0     10-  Mg     2.0     10-    TPro  5.8<L>  /  Alb  2.2<L>  /  TBili  0.6  /  DBili  x   /  AST  525<H>  /  ALT  479<H>  /  AlkPhos  92  10    CAPILLARY BLOOD GLUCOSE      POCT Blood Glucose.: 97 mg/dL (09 Oct 2021 10:50)  POCT Blood Glucose.: 98 mg/dL (09 Oct 2021 07:13)  POCT Blood Glucose.: 94 mg/dL (08 Oct 2021 21:40)  POCT Blood Glucose.: 99 mg/dL (08 Oct 2021 17:24)  POCT Blood Glucose.: 98 mg/dL (08 Oct 2021 11:05)    CARDIAC MARKERS ( 09 Oct 2021 04:06 )  0.297 ng/mL / x     / 281 U/L / x     / <1.0 ng/mL      LIVER FUNCTIONS - ( 09 Oct 2021 04:06 )  Alb: 2.2 g/dL / Pro: 5.8 g/dL / ALK PHOS: 92 U/L / ALT: 479 U/L DA / AST: 525 U/L / GGT: x           Creatinine Trend: 2.45<--, 3.03<--, 2.12<--, 2.02<--, 2.11<--, 2.17<--  I&O's Summary    08 Oct 2021 07:01  -  09 Oct 2021 07:00  --------------------------------------------------------  IN: 832.3 mL / OUT: 3595 mL / NET: -2762.7 mL    09 Oct 2021 07:01  -  09 Oct 2021 11:03  --------------------------------------------------------  IN: 144.9 mL / OUT: 170 mL / NET: -25.1 mL        ABG - ( 09 Oct 2021 04:01 )  pH, Arterial: 7.45  pH, Blood: x     /  pCO2: 40    /  pO2: 227   / HCO3: 28    / Base Excess: 3.5   /  SaO2: 100                 .Body Fluid Pleural Fluid  10-08 @ 18:51 --  --  --      .Body Fluid Pleural Fluid  10-08 @ 18:34 --  --    polymorphonuclear leukocytes seen  No organisms seen  by cytocentrifuge      .Tissue Right 5th toe r/o osteomyeltis   @ 13:54   No growth at 5 days  --    No polymorphonuclear cells seen per low power field  No organisms seen per oil power field      .Blood Blood-Venous   @ 10:28   No Growth Final  --  --      .Surgical Swab right foot wound   @ 21:42   Culture yields >4 types of aerobic and/or anaerobic bacteria  Call client services within 7 days if further workup is clinically  indicated. Culture includes  Rare Aeromonas hydrophila/caviae  Few Klebsiella oxytoca/Raoutella ornithinolytica  Few Enterococcus faecalis  Few Streptococcus agalactiae (Group B) isolated  Group B streptococci are susceptible to ampicillin,  penicillin and cefazolin, but may be resistant to  erythromycin and clindamycin.  Recommendations for intrapartum prophylaxis for Group B  streptococci are penicillin or ampicillin.  --  Aeromonas hydrophila/caviae  Klebsiella oxytoca /Raoutella ornithinolytica  Enterococcus faecalis      Bone R 5th metatarsal bone clean ma   @ 03:59   No growth at 5 days  --    No polymorphonuclear leukocytes seen per low power field  No organisms seen per oil power field      .Blood Blood-Venous   @ 21:09   No Growth Final  --  --      .Surgical Swab Right foot plantar wound   @ 21:08   Moderate Streptococcus agalactiae (Group B) isolated  Group B streptococci are susceptible to ampicillin,  penicillin and cefazolin, but may be resistant to  erythromycin and clindamycin.  Recommendations for intrapartum prophylaxis for Group B  streptococci are penicillin or ampicillin.  Moderate Bacteroides fragilis "Susceptibilities not performed"  Normal skin filiberto isolated  --  --          MEDICATIONS:    MEDICATIONS  (STANDING):  aspirin  chewable 81 milliGRAM(s) Oral daily  atorvastatin 80 milliGRAM(s) Oral at bedtime  chlorhexidine 0.12% Liquid 15 milliLiter(s) Oral Mucosa every 12 hours  chlorhexidine 2% Cloths 1 Application(s) Topical <User Schedule>  cyanocobalamin 1000 MICROGram(s) Oral daily  ergocalciferol 74333 Unit(s) Oral <User Schedule>  fentaNYL   Infusion 0.5 MICROgram(s)/kG/Hr (4.35 mL/Hr) IV Continuous <Continuous>  ferrous    sulfate 325 milliGRAM(s) Oral daily  finasteride 5 milliGRAM(s) Oral daily  insulin lispro (ADMELOG) corrective regimen sliding scale   SubCutaneous Before meals and at bedtime  meropenem  IVPB 500 milliGRAM(s) IV Intermittent every 12 hours  pantoprazole  Injectable 40 milliGRAM(s) IV Push at bedtime  phenylephrine    Infusion 0.5 MICROgram(s)/kG/Min (8.15 mL/Hr) IV Continuous <Continuous>  propofol Infusion 30 MICROgram(s)/kG/Min (15.6 mL/Hr) IV Continuous <Continuous>  tamsulosin 0.4 milliGRAM(s) Oral at bedtime  vancomycin  IVPB 1250 milliGRAM(s) IV Intermittent daily      MEDICATIONS  (PRN):  ondansetron Injectable 4 milliGRAM(s) IV Push three times a day PRN Nausea and/or Vomiting  sodium chloride 0.9% lock flush 10 milliLiter(s) IV Push every 1 hour PRN Pre/post blood products, medications, blood draw, and to maintain line patency      REVIEW OF SYSTEMS:                           ALL ROS DONE [ X   ]    CONSTITUTIONAL:  LETHARGIC [   ], FEVER [   ], UNRESPONSIVE [   ]  CVS:  CP  [   ], SOB, [   ], PALPITATIONS [   ], DIZZYNESS [   ]  RS: COUGH [   ], SPUTUM [   ]  GI: ABDOMINAL PAIN [   ], NAUSEA [   ], VOMITINGS [   ], DIARRHEA [   ], CONSTIPATION [   ]  :  DYSURIA [   ], NOCTURIA [   ], INCREASED FREQUENCY [   ], DRIBLING [   ],  SKELETAL: PAINFUL JOINTS [   ], SWOLLEN JOINTS [   ], NECK ACHE [   ], LOW BACK ACHE [   ],  SKIN : ULCERS [   ], RASH [   ], ITCHING [   ]  CNS: HEAD ACHE [   ], DOUBLE VISION [   ], BLURRED VISION [   ], AMS / CONFUSION [   ], SEIZURES [   ], WEAKNESS [   ],TINGLING / NUMBNESS [   ]    PHYSICAL EXAMINATION:  GENERAL APPEARANCE: NO DISTRESS  HEENT:  NO PALLOR, NO  JVD,  NO   NODES, NECK SUPPLE  CVS: S1 +, S2 +,   RS: AEEB,  OCCASIONAL  RALES +,   NO RONCHI, CRACKLES +   ET+  ABD: SOFT, NT, NO, BS +       EXT: NO PE  SKIN: WARM,   RIGHT FOOT WRAPPED W/ WOUND VAC IN PLACE   ,   CVC+  ,   CT + [RIGH] ATTACHED TO PLEUROVAC  SKELETAL:  ROM ACCEPTABLE  CNS:  AAO X  0    RADIOLOGY :    EXAM:  CT ABDOMEN AND PELVIS OC                            PROCEDURE DATE:  2021          INTERPRETATION:  CLINICAL INFORMATION: Small bowel obstruction.    COMPARISON: None.    CONTRAST/COMPLICATIONS:  IV Contrast: NONE  Oral Contrast: Omnipaque 300  Complications: None reported at time of study completion    PROCEDURE:  CT of the Abdomen and Pelvis was performed.  Sagittal and coronal reformats were performed.    FINDINGS:  LOWER CHEST: Small bilateral pleural effusions with compressiveatelectasis of both lower lobes.    LIVER: Within normal limits.  BILE DUCTS: Normal caliber.  GALLBLADDER: Distended. Small layering gallstones.  SPLEEN: Within normal limits.  PANCREAS: Within normal limits.  ADRENALS: Within normal limits.  KIDNEYS/URETERS: No hydronephrosis. Nonobstructing left upper pole calculus, measuring 0.6 cm.    BLADDER: Urinary bladder contains air and a Castañeda catheter balloon.  REPRODUCTIVE ORGANS: Prostate is not enlarged.    BOWEL: No bowel obstruction. Appendix isnormal. Colonic diverticulosis.  PERITONEUM: Mild presacral fluid.  VESSELS: Atherosclerotic changes.  RETROPERITONEUM/LYMPH NODES: No lymphadenopathy.  ABDOMINAL WALL: Within normal limits.  BONES: Degenerative changes. Sternotomy.    IMPRESSION:  No acute intra-abdominal pathology.    Small bilateral pleural effusions with compressive atelectasis of both lower lobes.    ====================================================    EXAM:  MR FOOT RT                            PROCEDURE DATE:  2021          INTERPRETATION:  Clinical Information: Recent fifth metatarsal head resection now with fifth digit pain, swelling and foul discharge.    Comparison: Radiographs of the right foot from 2021 and MRI the right foot from 2021.    Technique:  MRI of the right midfoot and forefoot.  Intravenous Contrast: None.    Findings:    There is a resection at the mid aspect of the fifth metatarsal shaft. There is a lateral soft tissue wound beginning at the level of the amputation which extends distally. There is susceptibility artifact in the region of the amputation consistent with postoperative change. There is hyperintense T2 marrow signal throughout the remaining fifth metatarsal and within the adjacent fourth metatarsal head which is nonspecific and could be related to recent postoperative changes although osteomyelitis is suspected.    There is edema and mild atrophy within the plantar muscles of the foot. There is minimal spurring at the first metatarsophalangeal and hallux sesamoid articulations.    Impression:  Resection at the mid aspect of the fifth metatarsal. Lateral soft tissue wound with marrow signal abnormality throughout the fifth metatarsal and within the adjacent fourth metatarsal head which is nonspecific in the setting of recent surgery although osteomyelitis is suspected.        ASSESSMENT :     Headache    HTN (hypertension)    HLD (hyperlipidemia)    DM (diabetes mellitus)    CAD (coronary artery disease)    Glaucoma, angle-closure    [place] (hard of hearing)    No significant past surgical history    S/P CABG (coronary artery bypass graft)    History of thyroid surgery        PLAN:  HPI:  76M from home, lives with daughter w/ PMH DM on insulin, HTN, HLD, CAD, PSH R partial 5th ray amputation 2021 p/w R foot pain x1 day associated with foul smelling discharge. Pt with sharp pain on the R lateral foot. As per daughter, pt was taking tylenol which did not help his pain prompting the patient to ask his daughter to take him to the hospital. Pt is a poor historian. Daughter states that the patient did not see his podiatrist after the surgery. No fever, chest, pain, palpitations, nausea, vomiting, diarrhea (17 Sep 2021 01:11)    # TRANSFERRED TO ICU FOR WORSENING HYPOXIA AND DYSPNEA    # SUSPECT RIGHT FOOT OSTEOMYELITIS S/P RIGHT 5TH RAY RESECTION [] AND S/P DEBRIDEMENT, GRAFT APPLICATION, BONE BX AND WOUND VAC APPLICATION   ** RECENT ADMISSION FOR RIGHT DIABETIC FOOT ULCER, CELLULITIS, OSTEOMYELITIS RIGHT 5th METATARSAL S/P RIGHT 5TH PARTIAL RAY AMPUTATION [] - BONE PATHOLOGY W/ CLEAN MARGINS    - S/P VANCOMYCIN AND ZOSYN ; NOW ON UNASYN AND LEVAQUIN GIVEN OREN; PER ID SWITCH TO ZOSYN UNTIL 11/3  - RECENTLY HAD SIMON W/ MILD PAD  - REVIEWED WOUND CX [ ENTEROCOCCUS, AEROMONAS, KLEBSIELLA] AND BCX  - BONE BX - acute osteomyelitis and necrotic periosteal tissue.   - ID CONSULT, PODIATRY CONSULT    - PICC LINE PLACED TODAY TO COMPLETE 6 WEEKS OF ANTIBIOTICS - PER ID SWITCH TO ZOSYN UNTIL 11/3    # ACUTE HYPOXIC RESPIRATORY FAILURE SUSPECT S/T PULMONARY EDEMA IN THE SETTING OF SEVERE AORTIC STENOSIS + ASPIRATION PNA - S/P CT TUBE PLACEMENT [SET TO PLEUROVAC] - SWITCHED FROM LEVAQUIN TO VANCOMYCIN + MEROPENEM, LASIX, CARDIOLOGY CONSULT IN PROGRESS  - S/P CRITICAL CARE CONSULT  - S/P LASIX ON 10/1 - 10/2, 10/4 AND 10/5  - CT CHEST W/ BILATERAL PLEURAL EFFUSIONS [10/5] - PULMONOLOGY CONSULT FOR POSSIBLE THORACENTESIS & WILL CONTINUE LASIX   - ALSO F/U REPEAT ECHOCARDIOGRAM  - CT SURGERY WAS CONSULTED BUT UNABLE TO PLACE PIGTAIL, THUS IR CONSULT IN PROGRESS FOR PIGTAIL PLACEMENT  - CHEST TUBE PLACED [10/8]  - NOTED REPEAT ECHO    # SHOCK - ? S/T HYPOVOLEMIA VS. SEPSIS - W/ CVC [SWITCHED FROM FEMORAL TO LEFT IJ], ON VASOPRESSORS - SWITCHED TO VANCOMYCIN AND MEROPENEM  - F/U BCX, F/U UA  - ID CONSULT IN PROGRESS    # TRANSIENT NEW ONSET A.FIB, PAROXYSMAL - MONITORING ON CARDIAC MONITOR, MAY NEED A/C, CARDIOLOGY CONSULT IN PROGRESS    # FUNGURIA - D/C FLUCONAZOLE GIVEN TRANSAMINITIS    # TRANSAMINITIS - SUSPECT THIS IS ISCHEMIC HEPATITIS - HEPATOLOGY CONSULT IN PROGRESS    # BOWEL DISTENTION - ? ILEUS - OPTIMIZING ELECTROLYTES - IMPROVED  - SURGERY CONSULT IN PROGRESS  - PASSED 2 BMS ON     # ASPIRATION EVENT WHEN PATIENT REMOVED NGT - ON LEVAQUIN, ID CONSULT IN PROGRESS    # CHOLELITHIASIS - TRENDING LFTS, RUQ U/S - CHOLELITHIASIS, SURGERY CONSULT IN PROGRESS  - GI CONSULT IN PROGRESS - LESS SUSPICIOUS FOR CHOLECYSTITIS    # DYSPEPSIA - PLACED ON PPI BID WITH IMPROVEMENT, UNDERWENT ST EVAL     # ELEVATED TROPONINS - NSTEMI - ? DEMAND - WILL NEED ISCHEMIC EVAL ONCE ACUTE INFECTION RESOLVES PER CARDIOLOGY, CARDIOLOGY CONSULT IN PROGRESS  - ON ASA, STATIN, BB    # OREN ON CKD - S/T ATN AND URINARY RETENTION - S/P IVF, NOW W/ CASTAÑEDA, NEPHROLOGY CONUSLT IN PROGRESS  - ON FLOMAX, ADDED FINASTERIDE    - WITH WORSENING RENAL FXN - NOTED URINARY RETENTION [10/4] - REPLACED CASTAÑEDA    # MEDICAL CLEARANCE FOR SURGERY - RCRI - 2 - 10.1 % 30 DAY RISK OF DEATH, MI OR CARDIAC ARREST  - CARDIOLOGY CONSULT     # DM -  HBA1C - 11  - LANTUS, SSI + FS    # CAD S/P CABG - PLACED ON ASA, STATIN, BB  - ECHO - SEVERE AORTIC STENOSIS, SEVERE CONCENTRIC LVH, G1DD, MILD ID  - CARDIOLOGY CONSULT - DR. BASSETT   - MAY NEED ISCHEMIC EVAL ONCE ACUTE ILLNESS RESOLVES    # HTN - ON METOPROLOL, NICARDIPINE    # SEVERE, ASYMPTOMATIC, STENOSIS - ONCE ACUTE ILLNESS RESOLVES, WILL LIKELY NEED ISCHEMIC WORKUP, SABIHA TO EVALUATE FURTHER. D/W PATIENT, DAUGHTER AND CARDIOLOGY TEAM [PREVIOUSLY SAW DR. BASSETT]    # VITAMIN D DEFICIENCY - ON CHOLECALCIFEROL    # HLD - STATIN    # CASE DISCUSSED AT LENGTH WITH PATIENT AND DAUGHTER, BIRDIE ROBERT AT BEDSIDE AND VIA PHONE @ [phone] [10/5] - CASE DICUSSED AT LENGTH AND ALL QUESTIONS WERE ANSWERED. DAUGHTER UNDERSTOOD THAT PROGNOSIS IS GUARDED.  # PATIENT AND DAUGHTER REFUSED [facility] ON PREVIOUS ADMISSION, PREVIOUSLY WISHED FOR PATIENT RETURN HOME W/ HOME PT; HOWEVER NOW, DAUGHTER WISHES FOR PATIENT TO GO TO [facility] - [facility], AS PATIENT'S WIFE IS CURRENTLY ADMITTED THERE    # GI AND DVT PPX

## 2021-10-09 NOTE — PROGRESS NOTE ADULT - SUBJECTIVE AND OBJECTIVE BOX
INTERVAL HPI/OVERNIGHT EVENTS:  Patient seen and examined at bedside.   Intubated.     MEDICATIONS  (STANDING):  aspirin  chewable 81 milliGRAM(s) Oral daily  atorvastatin 80 milliGRAM(s) Oral at bedtime  chlorhexidine 0.12% Liquid 15 milliLiter(s) Oral Mucosa every 12 hours  chlorhexidine 2% Cloths 1 Application(s) Topical <User Schedule>  cyanocobalamin 1000 MICROGram(s) Oral daily  ergocalciferol 39058 Unit(s) Oral <User Schedule>  fentaNYL   Infusion 0.5 MICROgram(s)/kG/Hr (4.35 mL/Hr) IV Continuous <Continuous>  ferrous    sulfate 325 milliGRAM(s) Oral daily  finasteride 5 milliGRAM(s) Oral daily  heparin  Infusion.  Unit(s)/Hr (16 mL/Hr) IV Continuous <Continuous>  insulin lispro (ADMELOG) corrective regimen sliding scale   SubCutaneous Before meals and at bedtime  meropenem  IVPB 500 milliGRAM(s) IV Intermittent every 12 hours  pantoprazole  Injectable 40 milliGRAM(s) IV Push at bedtime  phenylephrine    Infusion 0.5 MICROgram(s)/kG/Min (8.15 mL/Hr) IV Continuous <Continuous>  propofol Infusion 30 MICROgram(s)/kG/Min (15.6 mL/Hr) IV Continuous <Continuous>  tamsulosin 0.4 milliGRAM(s) Oral at bedtime  vancomycin  IVPB 1250 milliGRAM(s) IV Intermittent daily    MEDICATIONS  (PRN):  ondansetron Injectable 4 milliGRAM(s) IV Push three times a day PRN Nausea and/or Vomiting  sodium chloride 0.9% lock flush 10 milliLiter(s) IV Push every 1 hour PRN Pre/post blood products, medications, blood draw, and to maintain line patency      Vital Signs Last 24 Hrs  T(C): 36.6 (09 Oct 2021 09:00), Max: 37 (08 Oct 2021 23:00)  T(F): 97.9 (09 Oct 2021 09:00), Max: 98.6 (08 Oct 2021 23:00)  HR: 68 (09 Oct 2021 11:00) (52 - 137)  BP: 110/51 (09 Oct 2021 11:00) (78/58 - 139/55)  BP(mean): 65 (09 Oct 2021 11:00) (52 - 101)  RR: 14 (09 Oct 2021 11:00) (11 - 23)  SpO2: 98% (09 Oct 2021 11:00) (80% - 100%)    Physical:  General: Sedated  Respiratory: Intubated  Chest: R pigtail in place to waterseal, no airleak, serous output noted, dressing C/D/I no surrounding crepitus/fluctuance     I&O's Detail    08 Oct 2021 07:01  -  09 Oct 2021 07:00  --------------------------------------------------------  IN:    Enteral Tube Flush: 155 mL    FentaNYL: 94.6 mL    IV PiggyBack: 250 mL    Norepinephrine: 38.8 mL    Phenylephrine: 13 mL    Phenylephrine: 26.1 mL    Propofol: 254.8 mL  Total IN: 832.3 mL    OUT:    Chest Tube (mL): 1300 mL    Indwelling Catheter - Urethral (mL): 2295 mL  Total OUT: 3595 mL    Total NET: -2762.7 mL      09 Oct 2021 07:01  -  09 Oct 2021 12:00  --------------------------------------------------------  IN:    Enteral Tube Flush: 60 mL    FentaNYL: 12.9 mL    Phenylephrine: 43.4 mL    Propofol: 28.6 mL  Total IN: 144.9 mL    OUT:    Indwelling Catheter - Urethral (mL): 170 mL  Total OUT: 170 mL    Total NET: -25.1 mL          LABS:                        7.8    7.42  )-----------( 162      ( 09 Oct 2021 04:06 )             24.7             10-09    147<H>  |  109<H>  |  34<H>  ----------------------------<  85  3.0<L>   |  28  |  2.45<H>    Ca    8.0<L>      09 Oct 2021 04:06  Phos  4.0     10-09  Mg     2.0     10-09    TPro  5.8<L>  /  Alb  2.2<L>  /  TBili  0.6  /  DBili  x   /  AST  525<H>  /  ALT  479<H>  /  AlkPhos  92  10-09    CXR: read pending, discussed with attending

## 2021-10-09 NOTE — PROGRESS NOTE ADULT - SUBJECTIVE AND OBJECTIVE BOX
C A R D I O L O G Y  **********************************    DATE OF SERVICE: 10-09-21    Remains intubate; ros limited.     aspirin  chewable 81 milliGRAM(s) Oral daily  atorvastatin 80 milliGRAM(s) Oral at bedtime  chlorhexidine 0.12% Liquid 15 milliLiter(s) Oral Mucosa every 12 hours  chlorhexidine 2% Cloths 1 Application(s) Topical <User Schedule>  cyanocobalamin 1000 MICROGram(s) Oral daily  ergocalciferol 15256 Unit(s) Oral <User Schedule>  fentaNYL   Infusion 0.5 MICROgram(s)/kG/Hr IV Continuous <Continuous>  ferrous    sulfate 325 milliGRAM(s) Oral daily  finasteride 5 milliGRAM(s) Oral daily  heparin  Infusion.  Unit(s)/Hr IV Continuous <Continuous>  insulin lispro (ADMELOG) corrective regimen sliding scale   SubCutaneous Before meals and at bedtime  meropenem  IVPB 500 milliGRAM(s) IV Intermittent every 12 hours  ondansetron Injectable 4 milliGRAM(s) IV Push three times a day PRN  pantoprazole  Injectable 40 milliGRAM(s) IV Push at bedtime  phenylephrine    Infusion 0.5 MICROgram(s)/kG/Min IV Continuous <Continuous>  propofol Infusion 30 MICROgram(s)/kG/Min IV Continuous <Continuous>  sodium chloride 0.9% lock flush 10 milliLiter(s) IV Push every 1 hour PRN  tamsulosin 0.4 milliGRAM(s) Oral at bedtime  vancomycin  IVPB 1250 milliGRAM(s) IV Intermittent daily                            8.5    8.66  )-----------( 178      ( 09 Oct 2021 12:22 )             26.8       10-09    147<H>  |  109<H>  |  34<H>  ----------------------------<  85  3.0<L>   |  28  |  2.45<H>    Ca    8.0<L>      09 Oct 2021 04:06  Phos  4.0     10-09  Mg     2.0     10-09    TPro  5.8<L>  /  Alb  2.2<L>  /  TBili  0.6  /  DBili  x   /  AST  525<H>  /  ALT  479<H>  /  AlkPhos  92  10-09      CARDIAC MARKERS ( 09 Oct 2021 12:22 )  0.222 ng/mL / x     / x     / x     / x      CARDIAC MARKERS ( 09 Oct 2021 04:06 )  0.297 ng/mL / x     / 281 U/L / x     / <1.0 ng/mL        T(C): 36.6 (10-09-21 @ 09:00), Max: 37 (10-08-21 @ 23:00)  HR: 63 (10-09-21 @ 15:00) (52 - 137)  BP: 125/52 (10-09-21 @ 15:00) (78/58 - 135/61)  RR: 15 (10-09-21 @ 15:00) (11 - 23)  SpO2: 100% (10-09-21 @ 15:00) (80% - 100%)  Wt(kg): --    I&O's Summary    08 Oct 2021 07:01  -  09 Oct 2021 07:00  --------------------------------------------------------  IN: 832.3 mL / OUT: 3595 mL / NET: -2762.7 mL    09 Oct 2021 07:01  -  09 Oct 2021 15:03  --------------------------------------------------------  IN: 633.7 mL / OUT: 475 mL / NET: 158.7 mL        HEENT:  (-)icterus (-)pallor  CV: N S1 S2 1/6 TRINIDAD (+)2 Pulses B/l  Resp:  Coarse sounds B/L, normal effort  GI: (+) BS Soft, NT, ND  Lymph:  (-)Edema, (-)obvious lymphadenopathy  Skin: Warm to touch, Normal turgor  Psych: Unable to assess mood and affect    Tele: PAF, currently in SR    TTE: < from: Transthoracic Echocardiogram (10.07.21 @ 15:26) >  CONCLUSIONS:  1. Normal mitral valve. Moderate mitral regurgitation.  2. Calcified aortic valve with decreased opening, cannot  exclude bicuspid. Peak transaortic valve gradient equals  32.4 mm Hg, mean transaortic valve gradient equals 19 mm  Hg, dimensionless index 0.22, estimated aortic valve area  equals 0.6sqcm (by continuity equation), consistent with  paradoxical low-flow low-gradient severe aortic stenosis.  Consider dobutamine stress echocardiogram and/or SABIHA for  further evaluation.  No aortic valve regurgitation seen.  3. Normal aortic root.  4. Normal left atrium.  5. Moderate concentric left ventricular hypertrophy.  6. Moderate global left ventricular systolic dysfunction  (EF 40-45% by visual estimation).  7. Grade II diastolic dysfunction (moderate).  8. Normal right atrium.  9. Normal right ventricular size with decreased RV systolic  function (TAPSE 1.2 cm).  10. RV systolic pressure is borderline normal at  34 mm Hg.  11. Tricuspid valve not well seen. Trace tricuspid  regurgitation.  12. Normal pulmonic valve. Trace pulmonic insufficiency is  noted.  13. No pericardial effusion.  14. Bilateral pleural effusions.    < end of copied text >    ASSESSMENT/PLAN: 	76y  Male PMH DM on insulin, HTN, HLD, normal lV fx moderate to severe AS 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    - monitor crt, nephrology f/u appreciated  - Abx per primary team  - Echo noted, Severe AS, EF 40-45%   - He will need a SABIHA and R+L heart cath when he recovers and has no active infection  - Cont medical management of CAD  - Pt. with new onset PAF - recommend ac if no contraindications for cva prevention   - follow up thoracic surgery  - vent support per MICU  - discussed with MICU team     Shantel Alford MD

## 2021-10-09 NOTE — CHART NOTE - NSCHARTNOTEFT_GEN_A_CORE
Left femoral line removed with aseptic techniques. Gauze and pressure applied till bleeding resolved. Left femoral central line removed with aseptic techniques. Gauze and pressure applied till bleeding resolved.

## 2021-10-09 NOTE — PROGRESS NOTE ADULT - ASSESSMENT
Assessment and Recommendation:   · Assessment	  Assessment:  - Acute Hypoxic Respiratory Failure  - Sepsis 2/2 to Aspiration PNA vs R foot OM   - OREN superimposed on CKD  - Severe AS   - CAD s/p CABG   - Cholelithiasis   - HTN  - HLD    Plan:  Neuro:    - On propofol, 25mcg,   - On Fentanyl drip   - Versed, precedex as needed    CVS:  # Severe Aortic Stenosis   - Echo 8/15  confirmed EF 55-60%, Severe LVH, g1 diastolic dysfunction, and severe AS   -Murmurs on exam, systolic  -Cardiology on board SABIHA once stable    # combined systolic and diastolic heart failure  - CT Chest with pulm edema bilaterally; moderate to large pleural effusions; atelectasis vs PNA   - Echo 8/15  confirmed EF 55-60%, Severe LVH, g1 diastolic dysfunction, and severe AS   - new 10/8/2021  EF 40%  - BNP 19K with worsening overload as above; 9K today pt with poor cardiac reserve worsened by acute infection   - CXR  this admission showing improved R sided pleural effusion with diuresis  - Avoid drugs which increase afterload   -40IV lasix Q6 will switch to once day and transition to 20 daily once net even      #CAD   - S/p CABG, on ASA   - Continue Toprol 100 Qd, Procardia 60XL  - C/w Lasix 40 IV mg Q6h, Albumin 100mg   - Positive trop 1.46->1.550; likely due to increased demand and renal failure.  -Now down trended 1.05  - No active chest pain   - Echo 10/8/2021 EF 40%  - Will need a SABIHA and R+L heart cath once medically optimized  - C/w ASA, statin  - Cardiology Dr. Wilkins     #hypotension  Likely in the setting of intrapulmonary pressures,  CXR looks worse, with pleural effusion and increased vascular marking  Midodrine 5mg stat given 10/8/2021 followed by stress dose, followed by pheny  Intubated   Overnight and this morning MAP 70 on Pheny of 0.3 despite propofol   c/w Albumin   c/w with mechanical ventilation until chest tune and lung function is improved  Hold all BP meds  Switch to levophed due to recent echo        Pulm:  - Acute Hypoxic/ Hypercapnic Resp Failure 2/2  1. RLL PNA vs  2. Right pleural effusions vs  3. CHF    - Patient on Pendant, saturating 90% on 10L pendant, but having increased WOB, tachypnea   - Possible left lower lobe pneumonia and bilateral pleural effusions on CT Chest   - ABG was showing 7.49/33/52/25-> respiratory alkalosis with hypoxic   -ICU  Bipap 10/5 for hypoxia and work of breathing,  -pt also had CABG hx and BNP 19k - 9K this am=dmission  - Per surgery, Tello catheter placed without issue and drained 1000cc immediately and clamped.  - Sent fluid for tests/ cultures LDH, cytology..  -c/w lasix 40IV daily  -c/w with abx ( Vanc and Meropenem )   - avoid fluid overloading     ID:  - Empiric Aspiration PNA coverage, R foot osteomyelitis, and Funguria   - Blood Cultures -ve till date, Bone and wound culture: showing Aeromonas, GBS, Klebsiella, Enterococcus   -Cultures from 9/22/2021, tissue and blood 9/17 negative  - UCX showing yeast - one source  - On Unasyn, Levaquin, Diflucan; renally dosed - all >12 days  -DC diflucan as it is only one source, also given today's LFTs  -DC Levaquin  -Continue on vanc and faustino    Nephro:  - Ibrahim placed for possible obstruction   - OREN on CKD stage III: ATN was resolving, however renal fxn now worsened.   - Kidney/ Bladder US with large distended bladder s/p ibrahmi placement on 9/23, then removed.   - S/p bladder scan 10/4 with 1L urine for which Ibrahim was reinserted  - FeUrea: 20.5% 9/23:   -Cr 2.02 down trended then 2.12, 3.02 BUN/ cr ratio 12 today most likely ATN from Hypotension  - Less likely due to obstructive uropathy.  No peripheral eosinophilia- no signs of AIN.   - Avoid nephrotoxins/ NSAIDs/ RCA. Monitor BMP.  - Nephrology Consulted Dr. Rodriguez     GI:  - NPO with tube feed   -RLL likely aspiration  - Had an Aspiration event after NG tube placed and subsequently removed by pt  - Aspiration precautions, speech and swallow recs, based on cineesophagogram  - Cholelithiasis noted on RUQ US   - Patient is having 2 lose stools daily  -however patient is now intubated   -dc senna    Heme:  - no indication for transfusion currently  - admitted with Hb 12.7 in August; now >8 stable  - No active bleeding, normocytic   - Fe panel shows deficiency; will hold repletion during acute infection  - Likely component of ACD, CKD  - Tranfuse if Hb<7 or if worsening tachy/HF sx     Endo:  - target CBG < 180 controlled NPO today  -feed after chest tube  - C/w Sliding Scale, accuchecks ACHS     Prophy:  - HSQ held for chest tube  - PPI Qd     Dispo:  - monitor in the ICU until off bipap  PT FULL CODE - confirmed w/ Daughter Mrs. Bell Pj        Assessment and Recommendation:   · Assessment	  Assessment:  - Acute Hypoxic Respiratory Failure  - Sepsis 2/2 to Aspiration PNA vs R foot OM   - OREN superimposed on CKD  - Severe AS   - CAD s/p CABG   - Cholelithiasis   - HTN  - HLD    Plan:  Neuro:    - On propofol, 25mcg,   - On Fentanyl drip   - Versed, precedex as needed    CVS:  # Severe Aortic Stenosis   - Echo 8/15  confirmed EF 55-60%, Severe LVH, g1 diastolic dysfunction, and severe AS   -Murmurs on exam, systolic  -Cardiology on board [facility] once stable    # combined systolic and diastolic heart failure  - CT Chest with pulm edema bilaterally; moderate to large pleural effusions; atelectasis vs PNA   - Echo 8/15  confirmed EF 55-60%, Severe LVH, g1 diastolic dysfunction, and severe AS   - new 10/8/2021  EF 40%  - BNP 19K with worsening overload as above; 9K today pt with poor cardiac reserve worsened by acute infection   - CXR  this admission showing improved R sided pleural effusion with diuresis  - Avoid drugs which increase afterload   -40IV lasix Q6 will switch to once day and transition to 20 daily once net even      #CAD   - S/p CABG, on ASA   - Continue Toprol 100 Qd, Procardia 60XL  - C/w Lasix 40 IV mg Q6h, Albumin 100mg   - Positive trop 1.46->1.550; likely due to increased demand and renal failure.  -Now down trended 1.05  - No active chest pain   - Echo 10/8/2021 EF 40%  - Will need a SABIHA and R+L heart cath once medically optimized  - C/w ASA, statin  - Cardiology Dr. Wilkins     #hypotension  Likely in the setting of intrapulmonary pressures,  CXR looks worse, with pleural effusion and increased vascular marking  Midodrine 5mg stat given 10/8/2021 followed by stress dose, followed by pheny  Intubated   Overnight and this morning MAP 70 on Pheny of 0.3 despite propofol   c/w Albumin   c/w with mechanical ventilation until chest tune and lung function is improved  Hold all BP meds  Switch to levophed due to recent echo        Pulm:  - Acute Hypoxic/ Hypercapnic Resp Failure 2/2  1. RLL PNA vs  2. Right pleural effusions vs  3. CHF    - Patient on Pendant, saturating 90% on 10L pendant, but having increased WOB, tachypnea   - Possible left lower lobe pneumonia and bilateral pleural effusions on CT Chest   - ABG was showing 7.49/33/52/25-> respiratory alkalosis with hypoxic   -ICU  Bipap 10/5 for hypoxia and work of breathing,  -pt also had CABG hx and BNP 19k - 9K this am=dmission  - Per surgery, Tello catheter placed without issue and drained 1000cc immediately and clamped.  - Sent fluid for tests/ cultures LDH, cytology..  -c/w lasix 40IV daily  -c/w with abx ( Vanc and Meropenem )   - avoid fluid overloading     ID:  - Empiric Aspiration PNA coverage, R foot osteomyelitis, and Funguria   - Blood Cultures -ve till date, Bone and wound culture: showing Aeromonas, GBS, Klebsiella, Enterococcus   -Cultures from 9/22/2021, tissue and blood 9/17 negative  - UCX showing yeast - one source  - On Unasyn, Levaquin, Diflucan; renally dosed - all >12 days  -DC diflucan as it is only one source, also given today's LFTs  -DC Levaquin  -Continue on vanc and faustino    Nephro:  - Ibrahim placed for possible obstruction   - OREN on CKD stage III: ATN was resolving, however renal fxn now worsened.   - Kidney/ Bladder US with large distended bladder s/p ibrahim placement on 9/23, then removed.   - S/p bladder scan 10/4 with 1L urine for which Ibrahim was reinserted  - FeUrea: 20.5% 9/23:   -Cr 2.02 down trended then 2.12, 3.02 BUN/ cr ratio 12 today most likely ATN from Hypotension  - Less likely due to obstructive uropathy.  No peripheral eosinophilia- no signs of AIN.   - Avoid nephrotoxins/ NSAIDs/ RCA. Monitor BMP.  - Nephrology Consulted Dr. Rodriguez     GI:  - NPO with tube feed   -RLL likely aspiration  - Had an Aspiration event after NG tube placed and subsequently removed by pt  - Aspiration precautions, speech and swallow recs, based on cineesophagogram  - Cholelithiasis noted on RUQ US   - Patient is having 2 lose stools daily  -however patient is now intubated   -dc senna    Heme:  - no indication for transfusion currently  - admitted with Hb 12.7 in August; now >8 stable  - No active bleeding, normocytic   - Fe panel shows deficiency; will hold repletion during acute infection  - Likely component of ACD, CKD  - Tranfuse if Hb<7 or if worsening tachy/HF sx     Endo:  - target CBG < 180 controlled NPO today  -feed after chest tube  - C/w Sliding Scale, accuchecks ACHS     Dispo:  - monitor in the ICU   - PT FULL CODE - confirmed w/ Daughter Mrs. Arabella Oliva        Assessment and Recommendation:   · Assessment	  Assessment:  - Acute Hypoxic Respiratory Failure  - Sepsis 2/2 to Aspiration PNA vs R foot OM   - OREN superimposed on CKD  - Severe AS   - CAD s/p CABG   - Cholelithiasis   - HTN  - HLD    Plan:  Neuro:    - On propofol, 25mcg, will reduce to wake him up  - On Fentanyl drip   - Versed, precedex as needed  -Pupils are not equal and reactive, needs a CT head    CVS:  # Severe Aortic Stenosis   - Echo 8/15  confirmed EF 55-60%, Severe LVH, g1 diastolic dysfunction, and severe AS   -Murmurs on exam, systolic  -Cardiology on board [facility] once stable    # combined systolic and diastolic heart failure  - CT Chest with pulm edema bilaterally; moderate to large pleural effusions; atelectasis vs PNA   - Echo 8/15  confirmed EF 55-60%, Severe LVH, g1 diastolic dysfunction, and severe AS   - new 10/8/2021  EF 40%  - BNP 19K with worsening overload as above; 9K today pt with poor cardiac reserve worsened by acute infection   - CXR  this admission showing improved R sided pleural effusion with diuresis  - Avoid drugs which increase afterload   -40IV lasix Q6 will switch to once day and transition to 20 daily once net even      #CAD   - S/p CABG, on ASA   - Continue Toprol 100 Qd, Procardia 60XL once satble  - C/w Lasix 40 IV mg Q6h, Albumin 100mg   - Positive trop 1.46->1.550; likely due to increased demand and renal failure.  -Now down trended 1.05  - No active chest pain   - Echo 10/8/2021 EF 40%  - Will need a SABIHA and R+L heart cath once medically optimized  - C/w ASA, statin  - Cardiology Dr. Wilkins     #hypotension  Likely in the setting of intrapulmonary pressures,  CXR looks worse, with pleural effusion and increased vascular marking  Midodrine 5mg stat given 10/8/2021 followed by stress dose, followed by pheny  Intubated   Overnight and this morning MAP 70 on Pheny of 0.3 despite propofol   c/w Albumin   c/w with mechanical ventilation until chest tune and lung function is improved  Hold all BP meds  Switch to levophed due to recent echo    #tachycardia 120s  The EKG and tele record A-fib with RVR, and left bundle   Afib likely new, old EKGs say sinus rhythm with nonspecific IVCD, LAPB  Hx of CABG  AC held for chest tube yesterday  Will resume today, Dr. Wilkins on board will be notified          Pulm:  - Acute Hypoxic/ Hypercapnic Resp Failure 2/2  1. RLL PNA vs  2. Right pleural effusions vs  3. CHF    - Patient was on Pendant, saturating 90% on 10L pendant, but having increased WOB, tachypnea   - Possible left lower lobe pneumonia and bilateral pleural effusions on CT Chest   - ABG was showing 7.49/33/52/25-> respiratory alkalosis with hypoxic   -ICU  Bipap 10/5 for hypoxia and work of breathing,  -Got intubated 10/6/2021  -pt also had CABG hx and BNP 19k - 9K this am=dmission  - Per surgery, Tello catheter placed without issue and drained 1000cc immediately and clamped.  - Sent fluid for tests/ cultures LDH, cytology..  -c/w lasix 40IV daily  -c/w with abx ( Vanc and Meropenem )   - avoid fluid overloading   -Vent set 18/450/5/40 sats 100 may be SBT'd today    ID:  - Empiric Aspiration PNA coverage, R foot osteomyelitis, and Funguria   - Blood Cultures -ve till date, Bone and wound culture: showing Aeromonas, GBS, Klebsiella, Enterococcus   -Cultures from 9/22/2021, tissue and blood 9/17 negative  - UCX showing yeast - one source  - On Unasyn, Levaquin, Diflucan; renally dosed - all >12 days  -DC diflucan as it is only one source, also given today's LFTs  -DC Levaquin  -Continue on vanc and faustino day 2    Nephro:  - Ibrahim placed for possible obstruction   - OREN on CKD stage III: ATN was resolving, however renal fxn now worsened.   - Kidney/ Bladder US with large distended bladder s/p ibrahim placement on 9/23, then removed.   - S/p bladder scan 10/4 with 1L urine for which Ibrahim was reinserted  - FeUrea: 20.5% 9/23:   -Cr 2.02 down trended then 2.12, 3.02 BUN/ cr ratio 12 today most likely ATN from Hypotension  - Less likely due to obstructive uropathy.  No peripheral eosinophilia- no signs of AIN.   - Avoid nephrotoxins/ NSAIDs/ RCA. Monitor BMP.  -UOP now -ve   -Nephrology on board Dr. Rodriguez     GI:  -RLL likely aspiration  - Had an Aspiration event after NG tube placed and subsequently removed by pt  - Aspiration precautions, speech and swallow recs, based on cineesophagogram  - Cholelithiasis noted on RUQ US   - Patient is having 2 lose stools daily  -however patient is now intubated   - NPO with tube feed   -dc senna    #shock liver  after a day of hypertension  improving  maintain BP  Will pursue RUQ US today    Heme:  - no indication for transfusion currently  - admitted with Hb 12.7 in August; small downtrend in the setting of CKD   - No active bleeding, normocytic   - Fe panel shows deficiency; will hold repletion during acute infection  - Likely component of ACD, CKD  - Tranfuse if Hb<7 or if worsening tachy/HF sx     Endo:  - target CBG < 180 controlled NPO today  -On tube feeds  - C/w Sliding Scale, accuchecks ACHS     Dispo:  - monitor in the ICU   - PT FULL CODE - confirmed w/ Daughter Mrs. Bell Pj

## 2021-10-09 NOTE — PROGRESS NOTE ADULT - SUBJECTIVE AND OBJECTIVE BOX
INTERVAL HPI/OVERNIGHT EVENTS: No acute events overnight.    PRESSORS: [ ] YES [ ] NO  WHICH:    ANTIBIOTICS:                  DATE STARTED:  ANTIBIOTICS:                  DATE STARTED:  ANTIBIOTICS:                  DATE STARTED:    Antimicrobial:  meropenem  IVPB 500 milliGRAM(s) IV Intermittent every 12 hours  vancomycin  IVPB 1250 milliGRAM(s) IV Intermittent daily    Cardiovascular:  norepinephrine Infusion 0.05 MICROgram(s)/kG/Min IV Continuous <Continuous>  tamsulosin 0.4 milliGRAM(s) Oral at bedtime    Pulmonary:    Hematalogic:    Other:  atorvastatin 80 milliGRAM(s) Oral at bedtime  chlorhexidine 0.12% Liquid 15 milliLiter(s) Oral Mucosa every 12 hours  chlorhexidine 2% Cloths 1 Application(s) Topical <User Schedule>  cyanocobalamin 1000 MICROGram(s) Oral daily  ergocalciferol 02521 Unit(s) Oral <User Schedule>  fentaNYL   Infusion 0.5 MICROgram(s)/kG/Hr IV Continuous <Continuous>  ferrous    sulfate 325 milliGRAM(s) Oral daily  finasteride 5 milliGRAM(s) Oral daily  insulin lispro (ADMELOG) corrective regimen sliding scale   SubCutaneous Before meals and at bedtime  ondansetron Injectable 4 milliGRAM(s) IV Push three times a day PRN  pantoprazole  Injectable 40 milliGRAM(s) IV Push at bedtime  propofol Infusion 30 MICROgram(s)/kG/Min IV Continuous <Continuous>  sodium chloride 0.9% lock flush 10 milliLiter(s) IV Push every 1 hour PRN    atorvastatin 80 milliGRAM(s) Oral at bedtime  chlorhexidine 0.12% Liquid 15 milliLiter(s) Oral Mucosa every 12 hours  chlorhexidine 2% Cloths 1 Application(s) Topical <User Schedule>  cyanocobalamin 1000 MICROGram(s) Oral daily  ergocalciferol 65495 Unit(s) Oral <User Schedule>  fentaNYL   Infusion 0.5 MICROgram(s)/kG/Hr IV Continuous <Continuous>  ferrous    sulfate 325 milliGRAM(s) Oral daily  finasteride 5 milliGRAM(s) Oral daily  insulin lispro (ADMELOG) corrective regimen sliding scale   SubCutaneous Before meals and at bedtime  meropenem  IVPB 500 milliGRAM(s) IV Intermittent every 12 hours  norepinephrine Infusion 0.05 MICROgram(s)/kG/Min IV Continuous <Continuous>  ondansetron Injectable 4 milliGRAM(s) IV Push three times a day PRN  pantoprazole  Injectable 40 milliGRAM(s) IV Push at bedtime  propofol Infusion 30 MICROgram(s)/kG/Min IV Continuous <Continuous>  sodium chloride 0.9% lock flush 10 milliLiter(s) IV Push every 1 hour PRN  tamsulosin 0.4 milliGRAM(s) Oral at bedtime  vancomycin  IVPB 1250 milliGRAM(s) IV Intermittent daily    Drug Dosing Weight  Height (cm): 170.2 (17 Sep 2021 21:58)  Weight (kg): 86.9 (05 Oct 2021 21:45)  BMI (kg/m2): 30 (05 Oct 2021 21:45)  BSA (m2): 1.99 (05 Oct 2021 21:45)    CENTRAL LINE: [ ] YES [ ] NO  LOCATION:         CASTA[place]EDA: [ ] YES [ ] NO          A-LINE:  [ ] YES [ ] NO  LOCATION:             ICU Vital Signs Last 24 Hrs  T(C): 37 (08 Oct 2021 23:00), Max: 37 (08 Oct 2021 23:00)  T(F): 98.6 (08 Oct 2021 23:00), Max: 98.6 (08 Oct 2021 23:00)  HR: 63 (09 Oct 2021 01:45) (52 - 70)  BP: 115/46 (09 Oct 2021 01:45) (82/57 - 139/55)  BP(mean): 64 (09 Oct 2021 01:45) (59 - 84)  ABP: --  ABP(mean): --  RR: 22 (09 Oct 2021 01:45) (11 - 25)  SpO2: 100% (09 Oct 2021 01:45) (80% - 100%)      ABG - ( 08 Oct 2021 03:26 )  pH, Arterial: 7.41  pH, Blood: x     /  pCO2: 38    /  pO2: 96    / HCO3: 24    / Base Excess: -0.3  /  SaO2: 98                    10-07 @ 07:01  -  10-08 @ 07:00  --------------------------------------------------------  IN: 1317.3 mL / OUT: 204 mL / NET: 1113.3 mL        Mode: AC/ CMV (Assist Control/ Continuous Mandatory Ventilation)  RR (machine): 14  TV (machine): 450  FiO2: 60  PEEP: 5  ITime: 0.9  MAP: 7  PIP: 16      REVIEW OF SYSTEMS:    CONSTITUTIONAL: No weakness, fevers or chills  NECK: No pain or stiffness  RESPIRATORY: No cough, wheezing, hemoptysis; No shortness of breath  CARDIOVASCULAR: No chest pain or palpitations  GASTROINTESTINAL: No abdominal or epigastric pain. No nausea, vomiting, No diarrhea or constipation. No melena or hematochezia.  GENITOURINARY: No dysuria, frequency or hematuria  NEUROLOGICAL: No numbness or weakness  All other review of systems is negative unless indicated above      PHYSICAL EXAM:    GENERAL: NAD, lying in bed, Intubated.  HEAD:  Atraumatic, Normocephalic  EYES: EOMI, PERRLA, conjunctiva and sclera clear  ENT: Moist mucous membranes  NECK: Supple, No JVD  CHEST/LUNG: Diminished breath sound more in left side    HEART: Regular rate and rhythm; GII holosystolic plataeu, at 5th ICS MCL, GI systolic crescendo decrescendo at RUSB, no rubs, or gallopsABDOMEN: Bowel sounds present; Soft, Nontender, Nondistended. No hepatomegaly  ABD: soft   EXTREMITIES:  2+ Peripheral Pulses, brisk capillary refill. No clubbing, cyanosis, or edema  NERVOUS SYSTEM: sedated  SKIN: No rashes or lesions        LABS:  CBC Full  -  ( 08 Oct 2021 03:52 )  WBC Count : 10.30 K/uL  RBC Count : 2.94 M/uL  Hemoglobin : 8.7 g/dL  Hematocrit : 27.1 %  Platelet Count - Automated : 244 K/uL  Mean Cell Volume : 92.2 fl  Mean Cell Hemoglobin : 29.6 pg  Mean Cell Hemoglobin Concentration : 32.1 gm/dL  Auto Neutrophil # : 8.62 K/uL  Auto Lymphocyte # : 1.12 K/uL  Auto Monocyte # : 0.42 K/uL  Auto Eosinophil # : 0.00 K/uL  Auto Basophil # : 0.05 K/uL  Auto Neutrophil % : 83.6 %  Auto Lymphocyte % : 10.9 %  Auto Monocyte % : 4.1 %  Auto Eosinophil % : 0.0 %  Auto Basophil % : 0.5 %    10-08    144  |  107  |  38<H>  ----------------------------<  93  3.8   |  26  |  3.03<H>    Ca    8.5      08 Oct 2021 03:52  Phos  6.4     10-08  Mg     2.4     10-08    TPro  6.9  /  Alb  3.1<L>  /  TBili  0.6  /  DBili  x   /  AST  1057<H>  /  ALT  493<H>  /  AlkPhos  99  10-08    PT/INR - ( 07 Oct 2021 04:07 )   PT: 16.6 sec;   INR: 1.42 ratio                 RADIOLOGY & ADDITIONAL STUDIES REVIEWED:  ***    [ ]GOALS OF CARE DISCUSSION WITH PATIENT/FAMILY/PROXY:    CRITICAL CARE TIME SPENT: 35 minutes BRIEF HOSPITAL COURSE  Patient is a 76y old  Male who presents with a chief complaint of R Foot Pain (05 Oct 2021 17:10)      HPI:  Patient is a 76 year old male  with PMH of poorly controlled IDDM Hgb A1C 11.4, HTN, HLD, stage III CKD, CAD s/p CABG and recent partial amputation to right 5th digit (8/19/21) who presented to ED with c/o right foot pain associated with discharge and foul odor. No post op follow up. Came to the ED due to sub obtimal pain control. No fever, chest, pain, palpitations, nausea, vomiting, diarrhea (17 Sep 2021 01:11)    INTERVAL HPI/ HOSPITAL COURSE   MRI confirms suspected osteomyelitis of the R foot - patient followed by podiatry and ID; and underwent debridement and wound VAC placement on R foot. Surgical pathology resulted OM, and wound culture resulting Enterococcus Aeromonal and Klebsiella- treated initially with Zosyn. He developed OREN likely mixed 2/2 to a mixed etiology with prerenal from active infection/ pulmonary edema, intrarenal due to toxicity from IV antibiotics and post renal from urinary retention, Nephrology consulted for optimization of kidney function. IV antibiotic regimen switched to Unasyn and Levaquin, sensitive for cultured organisms; requiring 6 weeks of IV antibiotics.     Patient then developed Pulmonary Edema and was treated with IV lasix. Cardiology consulted and recommending SABIHA with L/R heart cath once infection clears and medically stable. Hospital course further complicated by abdominal distention and constipation for which he received lactulose and had vomiting. AXR shows distended loops of bowel for which NG tube was placed which the patient removed overnight 9/26 - re-attempts to replace NG tube were unsuccessful as patient was non-cooperative.     The patient went on to develop worsening respiratory distress sats 80s% SPO2 on 10L NC : CXR showed possible RLL PNA. likely aspiration.CT abdomen deferred for now as patient is unstable and will not tolerate lying flat. CT chest done showing R>L pulmonary edema vs Pneumonia;. Pulm (DR. Xavier) consulted for possible thoracentesis, and recommended to start albumin + lasix. ICU was consulted for worsening respiratory status. Surgery consulted for pigtail placement, however, inadequate visualization of pocket.  Patient denies any current trouble breathing, chest pain, or cough.     INTERVAL HPI/OVERNIGHT EVENTS:   No problems overnight  Tachy this morning  sedated        PRESSORS: [ x] YES [ ] NO  WHICH:  Pheny 0.5 mcg    ANTIBIOTICS:           vanc            DATE STARTED:  10/8/2021  ANTIBIOTICS:              meropenem        DATE STARTED:   10/8/2021      Antimicrobial:  meropenem  IVPB 500 milliGRAM(s) IV Intermittent every 12 hours  vancomycin  IVPB 1250 milliGRAM(s) IV Intermittent daily    Cardiovascular:  norepinephrine Infusion 0.05 MICROgram(s)/kG/Min IV Continuous <Continuous>  tamsulosin 0.4 milliGRAM(s) Oral at bedtime    Pulmonary:    Hematalogic:    Other:  atorvastatin 80 milliGRAM(s) Oral at bedtime  chlorhexidine 0.12% Liquid 15 milliLiter(s) Oral Mucosa every 12 hours  chlorhexidine 2% Cloths 1 Application(s) Topical <User Schedule>  cyanocobalamin 1000 MICROGram(s) Oral daily  ergocalciferol 71509 Unit(s) Oral <User Schedule>  fentaNYL   Infusion 0.5 MICROgram(s)/kG/Hr IV Continuous <Continuous>  ferrous    sulfate 325 milliGRAM(s) Oral daily  finasteride 5 milliGRAM(s) Oral daily  insulin lispro (ADMELOG) corrective regimen sliding scale   SubCutaneous Before meals and at bedtime  ondansetron Injectable 4 milliGRAM(s) IV Push three times a day PRN  pantoprazole  Injectable 40 milliGRAM(s) IV Push at bedtime  propofol Infusion 30 MICROgram(s)/kG/Min IV Continuous <Continuous>  sodium chloride 0.9% lock flush 10 milliLiter(s) IV Push every 1 hour PRN    atorvastatin 80 milliGRAM(s) Oral at bedtime  chlorhexidine 0.12% Liquid 15 milliLiter(s) Oral Mucosa every 12 hours  chlorhexidine 2% Cloths 1 Application(s) Topical <User Schedule>  cyanocobalamin 1000 MICROGram(s) Oral daily  ergocalciferol 50018 Unit(s) Oral <User Schedule>  fentaNYL   Infusion 0.5 MICROgram(s)/kG/Hr IV Continuous <Continuous>  ferrous    sulfate 325 milliGRAM(s) Oral daily  finasteride 5 milliGRAM(s) Oral daily  insulin lispro (ADMELOG) corrective regimen sliding scale   SubCutaneous Before meals and at bedtime  meropenem  IVPB 500 milliGRAM(s) IV Intermittent every 12 hours  norepinephrine Infusion 0.05 MICROgram(s)/kG/Min IV Continuous <Continuous>  ondansetron Injectable 4 milliGRAM(s) IV Push three times a day PRN  pantoprazole  Injectable 40 milliGRAM(s) IV Push at bedtime  propofol Infusion 30 MICROgram(s)/kG/Min IV Continuous <Continuous>  sodium chloride 0.9% lock flush 10 milliLiter(s) IV Push every 1 hour PRN  tamsulosin 0.4 milliGRAM(s) Oral at bedtime  vancomycin  IVPB 1250 milliGRAM(s) IV Intermittent daily    Drug Dosing Weight  Height (cm): 170.2 (17 Sep 2021 21:58)  Weight (kg): 86.9 (05 Oct 2021 21:45)  BMI (kg/m2): 30 (05 Oct 2021 21:45)  BSA (m2): 1.99 (05 Oct 2021 21:45)    CENTRAL LINE: [ X] YES  LOCATION:   Left femoral      CASTAÑEDA: [X ] YES             ICU Vital Signs Last 24 Hrs  T(C): 37 (08 Oct 2021 23:00), Max: 37 (08 Oct 2021 23:00)  T(F): 98.6 (08 Oct 2021 23:00), Max: 98.6 (08 Oct 2021 23:00)  HR: 63 (09 Oct 2021 01:45) (52 - 70)  BP: 115/46 (09 Oct 2021 01:45) (82/57 - 139/55)  BP(mean): 64 (09 Oct 2021 01:45) (59 - 84)  ABP: --  ABP(mean): --  RR: 22 (09 Oct 2021 01:45) (11 - 25)  SpO2: 100% (09 Oct 2021 01:45) (80% - 100%)      ABG - ( 08 Oct 2021 03:26 )  pH, Arterial: 7.41  pH, Blood: x     /  pCO2: 38    /  pO2: 96    / HCO3: 24    / Base Excess: -0.3  /  SaO2: 98                    10-07 @ 07:01  -  10-08 @ 07:00  --------------------------------------------------------  IN: 1317.3 mL / OUT: 204 mL / NET: 1113.3 mL        Mode: AC/ CMV (Assist Control/ Continuous Mandatory Ventilation)  RR (machine): 14  TV (machine): 450  FiO2: 60  PEEP: 5  ITime: 0.9  MAP: 7  PIP: 16      REVIEW OF SYSTEMS:     sedated      PHYSICAL EXAM:    GENERAL: NAD, lying in bed, Intubated.  HEAD:  Atraumatic, Normocephalic  EYES: EOMI, PRRLA, conjunctiva and sclera clear  ENT: Moist mucous membranes  NECK: Supple, No JVD  CHEST/LUNG: Diminished breath sound more in left side    HEART: Regular rate and rhythm; GII holosystolic plataeu, at 5th ICS MCL, GI systolic crescendo decrescendo at RUSB, no rubs, or gallops  ABDOMEN: Bowel sounds present; Soft, Nontender, Nondistended. No hepatomegaly  EXTREMITIES:  2+ Peripheral Pulses, brisk capillary refill. No clubbing, cyanosis, or edema  NERVOUS SYSTEM: sedated  SKIN: No rashes or lesions        LABS:  CBC Full  -  ( 08 Oct 2021 03:52 )  WBC Count : 10.30 K/uL  RBC Count : 2.94 M/uL  Hemoglobin : 8.7 g/dL  Hematocrit : 27.1 %  Platelet Count - Automated : 244 K/uL  Mean Cell Volume : 92.2 fl  Mean Cell Hemoglobin : 29.6 pg  Mean Cell Hemoglobin Concentration : 32.1 gm/dL  Auto Neutrophil # : 8.62 K/uL  Auto Lymphocyte # : 1.12 K/uL  Auto Monocyte # : 0.42 K/uL  Auto Eosinophil # : 0.00 K/uL  Auto Basophil # : 0.05 K/uL  Auto Neutrophil % : 83.6 %  Auto Lymphocyte % : 10.9 %  Auto Monocyte % : 4.1 %  Auto Eosinophil % : 0.0 %  Auto Basophil % : 0.5 %    10-08    144  |  107  |  38<H>  ----------------------------<  93  3.8   |  26  |  3.03<H>    Ca    8.5      08 Oct 2021 03:52  Phos  6.4     10-08  Mg     2.4     10-08    TPro  6.9  /  Alb  3.1<L>  /  TBili  0.6  /  DBili  x   /  AST  1057<H>  /  ALT  493<H>  /  AlkPhos  99  10-08    PT/INR - ( 07 Oct 2021 04:07 )   PT: 16.6 sec;   INR: 1.42 ratio                 RADIOLOGY & ADDITIONAL STUDIES REVIEWED:  ***    [X ]GOALS OF CARE DISCUSSION WITH PATIENT/FAMILY/PROXY:    CRITICAL CARE TIME SPENT: 35 minutes BRIEF HOSPITAL COURSE  Patient is a 76y old  Male who presents with a chief complaint of R Foot Pain (05 Oct 2021 17:10)      HPI:  Patient is a 76 year old male  with PMH of poorly controlled IDDM Hgb A1C 11.4, HTN, HLD, stage III CKD, CAD s/p CABG and recent partial amputation to right 5th digit (8/19/21) who presented to ED with c/o right foot pain associated with discharge and foul odor. No post op follow up. Came to the ED due to sub obtimal pain control. No fever, chest, pain, palpitations, nausea, vomiting, diarrhea (17 Sep 2021 01:11)    INTERVAL HPI/ HOSPITAL COURSE   MRI confirms suspected osteomyelitis of the R foot - patient followed by podiatry and ID; and underwent debridement and wound VAC placement on R foot. Surgical pathology resulted OM, and wound culture resulting Enterococcus Aeromonal and Klebsiella- treated initially with Zosyn. He developed OREN likely mixed 2/2 to a mixed etiology with prerenal from active infection/ pulmonary edema, intrarenal due to toxicity from IV antibiotics and post renal from urinary retention, Nephrology consulted for optimization of kidney function. IV antibiotic regimen switched to Unasyn and Levaquin, sensitive for cultured organisms; requiring 6 weeks of IV antibiotics.     Patient then developed Pulmonary Edema and was treated with IV lasix. Cardiology consulted and recommending SABIHA with L/R heart cath once infection clears and medically stable. Hospital course further complicated by abdominal distention and constipation for which he received lactulose and had vomiting. AXR shows distended loops of bowel for which NG tube was placed which the patient removed overnight 9/26 - re-attempts to replace NG tube were unsuccessful as patient was non-cooperative.     The patient went on to develop worsening respiratory distress sats 80s% SPO2 on 10L NC : CXR showed possible RLL PNA. likely aspiration.CT abdomen deferred for now as patient is unstable and will not tolerate lying flat. CT chest done showing R>L pulmonary edema vs Pneumonia;. Pulm (DR. Xavier) consulted for possible thoracentesis, and recommended to start albumin + lasix. ICU was consulted for worsening respiratory status. Surgery consulted for pigtail placement, however, inadequate visualization of pocket.  Patient denies any current trouble breathing, chest pain, or cough.     INTERVAL HPI/OVERNIGHT EVENTS:   No problems overnight  Tachy this morning  sedated        PRESSORS: [ x] YES [ ] NO  WHICH:  Pheny 0.5 mcg    ANTIBIOTICS:           vanc            DATE STARTED:  10/8/2021  ANTIBIOTICS:              meropenem        DATE STARTED:   10/8/2021      Antimicrobial:  meropenem  IVPB 500 milliGRAM(s) IV Intermittent every 12 hours  vancomycin  IVPB 1250 milliGRAM(s) IV Intermittent daily    Cardiovascular:  norepinephrine Infusion 0.05 MICROgram(s)/kG/Min IV Continuous <Continuous>  tamsulosin 0.4 milliGRAM(s) Oral at bedtime    Pulmonary:    Hematalogic:    Other:  atorvastatin 80 milliGRAM(s) Oral at bedtime  chlorhexidine 0.12% Liquid 15 milliLiter(s) Oral Mucosa every 12 hours  chlorhexidine 2% Cloths 1 Application(s) Topical <User Schedule>  cyanocobalamin 1000 MICROGram(s) Oral daily  ergocalciferol 56228 Unit(s) Oral <User Schedule>  fentaNYL   Infusion 0.5 MICROgram(s)/kG/Hr IV Continuous <Continuous>  ferrous    sulfate 325 milliGRAM(s) Oral daily  finasteride 5 milliGRAM(s) Oral daily  insulin lispro (ADMELOG) corrective regimen sliding scale   SubCutaneous Before meals and at bedtime  ondansetron Injectable 4 milliGRAM(s) IV Push three times a day PRN  pantoprazole  Injectable 40 milliGRAM(s) IV Push at bedtime  propofol Infusion 30 MICROgram(s)/kG/Min IV Continuous <Continuous>  sodium chloride 0.9% lock flush 10 milliLiter(s) IV Push every 1 hour PRN    atorvastatin 80 milliGRAM(s) Oral at bedtime  chlorhexidine 0.12% Liquid 15 milliLiter(s) Oral Mucosa every 12 hours  chlorhexidine 2% Cloths 1 Application(s) Topical <User Schedule>  cyanocobalamin 1000 MICROGram(s) Oral daily  ergocalciferol 86332 Unit(s) Oral <User Schedule>  fentaNYL   Infusion 0.5 MICROgram(s)/kG/Hr IV Continuous <Continuous>  ferrous    sulfate 325 milliGRAM(s) Oral daily  finasteride 5 milliGRAM(s) Oral daily  insulin lispro (ADMELOG) corrective regimen sliding scale   SubCutaneous Before meals and at bedtime  meropenem  IVPB 500 milliGRAM(s) IV Intermittent every 12 hours  norepinephrine Infusion 0.05 MICROgram(s)/kG/Min IV Continuous <Continuous>  ondansetron Injectable 4 milliGRAM(s) IV Push three times a day PRN  pantoprazole  Injectable 40 milliGRAM(s) IV Push at bedtime  propofol Infusion 30 MICROgram(s)/kG/Min IV Continuous <Continuous>  sodium chloride 0.9% lock flush 10 milliLiter(s) IV Push every 1 hour PRN  tamsulosin 0.4 milliGRAM(s) Oral at bedtime  vancomycin  IVPB 1250 milliGRAM(s) IV Intermittent daily    Drug Dosing Weight  Height (cm): 170.2 (17 Sep 2021 21:58)  Weight (kg): 86.9 (05 Oct 2021 21:45)  BMI (kg/m2): 30 (05 Oct 2021 21:45)  BSA (m2): 1.99 (05 Oct 2021 21:45)    CENTRAL LINE: [ X] YES  LOCATION:   Left femoral      CASTAÑEDA: [X ] YES             ICU Vital Signs Last 24 Hrs  T(C): 37 (08 Oct 2021 23:00), Max: 37 (08 Oct 2021 23:00)  T(F): 98.6 (08 Oct 2021 23:00), Max: 98.6 (08 Oct 2021 23:00)  HR: 63 (09 Oct 2021 01:45) (52 - 70)  BP: 115/46 (09 Oct 2021 01:45) (82/57 - 139/55)  BP(mean): 64 (09 Oct 2021 01:45) (59 - 84)  ABP: --  ABP(mean): --  RR: 22 (09 Oct 2021 01:45) (11 - 25)  SpO2: 100% (09 Oct 2021 01:45) (80% - 100%)      ABG - ( 08 Oct 2021 03:26 )  pH, Arterial: 7.41  pH, Blood: x     /  pCO2: 38    /  pO2: 96    / HCO3: 24    / Base Excess: -0.3  /  SaO2: 98                    10-07 @ 07:01  -  10-08 @ 07:00  --------------------------------------------------------  IN: 1317.3 mL / OUT: 204 mL / NET: 1113.3 mL        Mode: AC/ CMV (Assist Control/ Continuous Mandatory Ventilation)  RR (machine): 14  TV (machine): 450  FiO2: 60  PEEP: 5  ITime: 0.9  MAP: 7  PIP: 16      REVIEW OF SYSTEMS:     sedated      PHYSICAL EXAM:    GENERAL: NAD, lying in bed, Intubated.  HEAD:  Atraumatic, Normocephalic  + ETT  EYES: EOMI, PRRLA, conjunctiva and sclera clear  ENT: Moist mucous membranes  NECK: Supple, No JVD  CHEST/LUNG: Diminished breath sound more in left side   right side chest tube, off suction   HEART: Regular rate and rhythm; GII holosystolic plataeu, at 5th ICS MCL, GI systolic crescendo decrescendo at RUSB, no rubs, or gallops  ABDOMEN: Bowel sounds present; Soft, Nontender, Nondistended. No hepatomegaly  EXTREMITIES:  2+ Peripheral Pulses, brisk capillary refill. No clubbing, cyanosis, or edema  NERVOUS SYSTEM: sedated  SKIN: No rashes or lesions        LABS:  CBC Full  -  ( 08 Oct 2021 03:52 )  WBC Count : 10.30 K/uL  RBC Count : 2.94 M/uL  Hemoglobin : 8.7 g/dL  Hematocrit : 27.1 %  Platelet Count - Automated : 244 K/uL  Mean Cell Volume : 92.2 fl  Mean Cell Hemoglobin : 29.6 pg  Mean Cell Hemoglobin Concentration : 32.1 gm/dL  Auto Neutrophil # : 8.62 K/uL  Auto Lymphocyte # : 1.12 K/uL  Auto Monocyte # : 0.42 K/uL  Auto Eosinophil # : 0.00 K/uL  Auto Basophil # : 0.05 K/uL  Auto Neutrophil % : 83.6 %  Auto Lymphocyte % : 10.9 %  Auto Monocyte % : 4.1 %  Auto Eosinophil % : 0.0 %  Auto Basophil % : 0.5 %    10-08    144  |  107  |  38<H>  ----------------------------<  93  3.8   |  26  |  3.03<H>    Ca    8.5      08 Oct 2021 03:52  Phos  6.4     10-08  Mg     2.4     10-08    TPro  6.9  /  Alb  3.1<L>  /  TBili  0.6  /  DBili  x   /  AST  1057<H>  /  ALT  493<H>  /  AlkPhos  99  10-08    PT/INR - ( 07 Oct 2021 04:07 )   PT: 16.6 sec;   INR: 1.42 ratio                 RADIOLOGY & ADDITIONAL STUDIES REVIEWED:  ***  CXR: pending official report     [X ]GOALS OF CARE DISCUSSION WITH PATIENT/FAMILY/PROXY:  ECHO:< from: Transthoracic Echocardiogram (10.07.21 @ 15:26) >    Patient name: SHER ROBERT  YOB: 1945   Age: 76 (M)   MR#: [id number]  Study Date: 10/7/2021  [id number]: [facility]  Study quality: Fair  Referring Physician:  KONG ALBRIGHT MD  Blood Pressure: 108/49 mmHg  Height: 170 cm  Weight: 86 kg  BSA: 2 m2  ------------------------------------------------------------------------    PROCEDURE: Transthoracic echocardiogram with 2-D, M-Mode  and complete spectral and color flow Doppler.  INDICATION: Shock,unspecified (R57.9)  HISTORY:  ------------------------------------------------------------------------  DIMENSIONS:  Dimensions:     Normal Values:  LA:     4.6 cm    2.0 - 4.0 cm  Ao:     3.8 cm    2.0 - 3.8 cm  SEPTUM: 1.2 cm    0.6 - 1.2 cm  PWT:   1.2 cm    0.6 - 1.1 cm  LVIDd:  5.5 cm    3.0 - 5.6 cm  LVIDs:  4.3 cm    1.8 - 4.0 cm      Derived Variables:  LVMI: 138 g/m2  RWT: 0.43  Ejection Fraction Visual Estimate: 40-45 %  Peak Velocity (m/sec): AoV=2.8  ------------------------------------------------------------------------  OBSERVATIONS:  Mitral Valve: Normal mitral valve. Moderate mitral  regurgitation.  Aortic Root: Normal aortic root.  Aortic Valve: Calcified aortic valve with decreased  opening, cannot exclude bicuspid. Peak transaortic valve  gradient equals 32.4 mm Hg, mean transaortic valve gradient  equals 19 mm Hg, dimensionless index 0.22, estimated aortic  valve area equals 0.6 sqcm (by continuity equation),  consistent with paradoxical low-flow low-gradient severe  aortic stenosis. Consider dobutamine stress echocardiogram  and/or SABIHA for further evaluation.  No aortic valve  regurgitation seen.  Left Atrium: Normal left atrium.  LA volume index = 26  cc/m2.  Left Ventricle: Moderate global left ventricular systolic  dysfunction (EF 40-45% by visual estimation). Moderate  concentric left ventricular hypertrophy. A false tendon is  noted. This is a normal variant.  Grade II diastolic  dysfunction (moderate).  Right Heart: Normal right atrium. Normal right ventricular  size with decreased RV systolic function (TAPSE 1.2 cm).  Tricuspid valve not well seen. Trace tricuspid  regurgitation. Normal pulmonic valve. Trace pulmonic  insufficiency is noted.  Pericardium/PleuraNo pericardial effusion. Bilateral  pleural effusions.  Hemodynamic: RA Pressure is 8 mm Hg. RV systolic pressure  is borderline normal at  34 mm Hg.  ------------------------------------------------------------------------  CONCLUSIONS:  1. Normal mitral valve. Moderate mitral regurgitation.  2. Calcified aortic valve with decreased opening, cannot  exclude bicuspid. Peak transaortic valve gradient equals  32.4 mm Hg, mean transaortic valve gradient equals 19 mm  Hg, dimensionless index 0.22, estimated aortic valve area  equals 0.6sqcm (by continuity equation), consistent with  paradoxical low-flow low-gradient severe aortic stenosis.  Consider dobutamine stress echocardiogram and/or SABIHA for  further evaluation.  No aortic valve regurgitation seen.  3. Normal aortic root.  4. Normal left atrium.  5. Moderate concentric left ventricular hypertrophy.  6. Moderate global left ventricular systolic dysfunction  (EF 40-45% by visual estimation).  7. Grade II diastolic dysfunction (moderate).  8. Normal right atrium.  9. Normal right ventricular size with decreased RV systolic  function (TAPSE 1.2 cm).  10. RV systolic pressure is borderline normal at  34 mm Hg.  11. Tricuspid valve not well seen. Trace tricuspid  regurgitation.  12. Normal pulmonic valve. Trace pulmonic insufficiency is  noted.  13. No pericardial effusion.  14. Bilateral pleural effusions.    *** Compared with echocardiogram report of 8/15/2021, there  has been decrease in LV systolic function. Pleural  effusions are now noted.  ------------------------------------------------------------------------  Confirmed on  10/8/2021 - 09:32:52 by Albert Jamil MD  ------------------------------------------------------------------------    < end of copied text >    CRITICAL CARE TIME SPENT: 35 minutes

## 2021-10-09 NOTE — PROGRESS NOTE ADULT - ASSESSMENT
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  As per daughter, patient was taking tylenol which did not help his pain prompting the patient to ask his daughter to take him to the hospital. ON admission, he has no fever or Leukocytosis. The Xray of Right foot shows no Osteomyelitis but MRI of Right foot shows Lateral soft tissue wound with marrow signal abnormality throughout the fifth metatarsal which is consistent of Osteomyelitis. He has seen by Podiatry and wound culture has sent, Zosyn and Vancomycin has started. The ID consult requested to assist with further evaluation and antibiotic management.     # Right Fifth metatarsal DFU with drainage and Osteomyelitis- wound cx grew Enterococcus, Aeromonas and Klebsiella - Zosyn is the ideal to cover All organisms but kidney function is worsening, hence change to Unasyn and Levaquin until kidney function is improved - The pathology shows Bone with acute osteomyelitis and necrotic periosteal tissue.   # OREN- s/p urinary retention -s/p ibrahim catheter - s/p discontinue ACEI  # RLL pneumonia- most likely Aspiration, S/p Vomiting - On Unasyn  # Large bowel distension  # Candiduria- 9/22/21  # Pulmonary edema with bilateral moderate to large pleural effusions- on CT chest, 10/5/21- s/p intubation 10/7- s/p Right sided chest tube placement by CT team, 10/8/21    would recommend:    1. Follow up pleural fluid cultures   2. Continue Meropenem and Vancomycin based on Crcl  3. Management of Vent as per ICU protocol  4. Monitor  kidney function and adjust Abx doses accordingly   5. Wound care as per Podiatry  6. Aspiration precaution    d/w ICU team ( DR. Xavier and the team )    Attending Attestation:    Spent more than 45 minutes on total encounter, more than 50 % of the visit was spent counseling and/or coordinating care by the Attending physician.  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  As per daughter, patient was taking tylenol which did not help his pain prompting the patient to ask his daughter to take him to the hospital. ON admission, he has no fever or Leukocytosis. The Xray of Right foot shows no Osteomyelitis but MRI of Right foot shows Lateral soft tissue wound with marrow signal abnormality throughout the fifth metatarsal which is consistent of Osteomyelitis. He has seen by Podiatry and wound culture has sent, Zosyn and Vancomycin has started. The ID consult requested to assist with further evaluation and antibiotic management.     # Right Fifth metatarsal DFU with drainage and Osteomyelitis- wound cx grew Enterococcus, Aeromonas and Klebsiella - Zosyn is the ideal to cover All organisms but kidney function is worsening, hence change to Unasyn and Levaquin until kidney function is improved - The pathology shows Bone with acute osteomyelitis and necrotic periosteal tissue.   # OREN- s/p urinary retention -s/p ibrahim catheter - s/p discontinue ACEI  # RLL pneumonia- most likely Aspiration, S/p Vomiting - On Unasyn  # Large bowel distension  # Candiduria- 9/22/21  # Pulmonary edema with bilateral moderate to large pleural effusions- on CT chest, 10/5/21- s/p intubation 10/7- s/p Right sided chest tube placement by CT team, 10/8/21    would recommend:    1. Follow up pleural fluid cultures   2. Continue Meropenem and Vancomycin based on level in the setting of kidney insufficiency   3. Management of Vent as per ICU protocol  4. Monitor  kidney function and adjust Abx doses accordingly   5. Wound care as per Podiatry  6. Aspiration precaution    d/w ICU team     Attending Attestation:    Spent more than 45 minutes on total encounter, more than 50 % of the visit was spent counseling and/or coordinating care by the Attending physician.

## 2021-10-09 NOTE — PROCEDURE NOTE - NSPROCDETAILS_GEN_ALL_CORE
Seldinger technique/dressing applied/secured in place/sterile dressing applied/ultrasound assessment of fluid (location)
guidewire recovered/lumen(s) aspirated and flushed/sterile dressing applied/sterile technique, catheter placed/ultrasound guidance with use of sterile gel and probe cove
patient pre-oxygenated, tube inserted, placement confirmed
guidewire recovered/lumen(s) aspirated and flushed/sterile dressing applied/sterile technique, catheter placed/ultrasound guidance with use of sterile gel and probe cove

## 2021-10-09 NOTE — PROGRESS NOTE ADULT - SUBJECTIVE AND OBJECTIVE BOX
Patient is seen and examined at the bed side, is afebrile. The kidney function started to improve.       REVIEW OF SYSTEMS: Unable to obtain due to mental status      ALLERGIES: No Known Allergies      ICU Vital Signs Last 24 Hrs  T(C): 36.9 (09 Oct 2021 15:00), Max: 37 (08 Oct 2021 23:00)  T(F): 98.4 (09 Oct 2021 15:00), Max: 98.6 (08 Oct 2021 23:00)  HR: 62 (09 Oct 2021 17:15) (54 - 137)  BP: 120/48 (09 Oct 2021 17:15) (78/58 - 135/61)  BP(mean): 66 (09 Oct 2021 17:15) (52 - 101)  ABP: --  ABP(mean): --  RR: 13 (09 Oct 2021 17:15) (12 - 23)  SpO2: 100% (09 Oct 2021 17:15) (80% - 100%)      PHYSICAL EXAM:  GENERAL: Intubated/vented  CHEST/LUNG:  Not using accessory muscles  HEART: s1 and s2 present  ABDOMEN:  Mild distended   EXTREMITIES: Right foot bandage in placed   CNS: Intubated/ vented      LABS:                        8.5    8.66  )-----------( 178      ( 09 Oct 2021 12:22 )             26.8                           8.7    10.30 )-----------( 244      ( 08 Oct 2021 03:52 )             27.1       10-09    147<H>  |  109<H>  |  34<H>  ----------------------------<  85  3.0<L>   |  28  |  2.45<H>    Ca    8.0<L>      09 Oct 2021 04:06  Phos  4.0     10-09  Mg     2.0     10-09    TPro  5.8<L>  /  Alb  2.2<L>  /  TBili  0.6  /  DBili  x   /  AST  525<H>  /  ALT  479<H>  /  AlkPhos  92  10-09    10-08    144  |  107  |  38<H>  ----------------------------<  93  3.8   |  26  |  3.03<H>    Ca    8.5      08 Oct 2021 03:52  Phos  6.4     10-08  Mg     2.4     10-08    TPro  6.9  /  Alb  3.1<L>  /  TBili  0.6  /  DBili  x   /  AST  1057<H>  /  ALT  493<H>  /  AlkPhos  99  10-08    09-25    139  |  107  |  41<H>  ----------------------------<  134<H>  4.1   |  21<L>  |  4.05<H>    Ca    8.6      25 Sep 2021 06:40    TPro  6.6  /  Alb  2.3<L>  /  TBili  0.5  /  DBili  x   /  AST  25  /  ALT  15  /  AlkPhos  102  09-25        CAPILLARY BLOOD GLUCOSE  POCT Blood Glucose.: 134 mg/dL (17 Sep 2021 17:11)  POCT Blood Glucose.: 128 mg/dL (17 Sep 2021 11:06)  POCT Blood Glucose.: 157 mg/dL (17 Sep 2021 04:10)      MEDICATIONS  (STANDING):    aspirin  chewable 81 milliGRAM(s) Oral daily  atorvastatin 80 milliGRAM(s) Oral at bedtime  chlorhexidine 0.12% Liquid 15 milliLiter(s) Oral Mucosa every 12 hours  chlorhexidine 2% Cloths 1 Application(s) Topical <User Schedule>  cyanocobalamin 1000 MICROGram(s) Oral daily  ergocalciferol 00368 Unit(s) Oral <User Schedule>  fentaNYL   Infusion 0.5 MICROgram(s)/kG/Hr (4.35 mL/Hr) IV Continuous <Continuous>  ferrous    sulfate 325 milliGRAM(s) Oral daily  finasteride 5 milliGRAM(s) Oral daily  heparin  Infusion.  Unit(s)/Hr (16 mL/Hr) IV Continuous <Continuous>  insulin lispro (ADMELOG) corrective regimen sliding scale   SubCutaneous Before meals and at bedtime  meropenem  IVPB 500 milliGRAM(s) IV Intermittent every 12 hours  pantoprazole  Injectable 40 milliGRAM(s) IV Push at bedtime  phenylephrine    Infusion 0.5 MICROgram(s)/kG/Min (8.15 mL/Hr) IV Continuous <Continuous>  propofol Infusion 30 MICROgram(s)/kG/Min (15.6 mL/Hr) IV Continuous <Continuous>  tamsulosin 0.4 milliGRAM(s) Oral at bedtime  vancomycin  IVPB 1250 milliGRAM(s) IV Intermittent daily      RADIOLOGY & ADDITIONAL TESTS:    10/5/21 CT Chest No Cont (10.05.21 @ 14:07) Pulmonary edema with bilateral moderate to large pleural effusions. Underlying bilateral lower lobe compressive atelectasis versus pneumonia.  Mildly enlarged mediastinal lymph nodes, possibly reactive.      10/2/21: Xray Chest 1 View- PORTABLE-Routine (Xray Chest 1 View- PORTABLE-Routine in AM.) (10.02.21 @ 09:46) There is persistent bilateral perihilar/basilar diffuse airspace disease and/or RIGHT effusion.  Cardiomegaly.  No interval change.    Collected Date/Time: 9/22/2021 08:42 EDT   Received Date/Time: 9/23/2021 09:29 EDT   Surgical Pathology Report - Auth (Verified)   Specimen(s) Submitted   1 Right 5th Toe Wound Debridement r/o Osteomyelitis   Final Diagnosis   Right fifth toe; wound debridement:   - Bone with acute osteomyelitis and necrotic periosteal tissue.     9/28/21: US Abdomen Upper Quadrant Right (09.28.21 @ 12:13) Cholelithiasis without evidence of acute cholecystitis. Note is made of right pleural effusion    9/27/21: CT Abdomen and Pelvis w/ Oral Cont (09.27.21 @ 15:53) >No acute intra-abdominal pathology.    Small bilateral pleural effusions with compressive atelectasis of both lower lobes.    9/26/21: Xray Chest 1 View-PORTABLE IMMEDIATE (Xray Chest 1 View-PORTABLE IMMEDIATE .) (09.26.21 @ 15:28) : Small bilateral pleural effusions and mild pulmonary edema. A right lower lobe pneumonia is not excluded.      9/26/21: Xray Abdomen 1 View Portable, IMMEDIATE (Xray Abdomen 1 View Portable, IMMEDIATE .) (09.26.21 @ 15:28) : There is a nasogastric tube with its tip in the stomach. There is gaseous distention of the colon. No pathologic calcifications are seen. The osseous structures are intact with degenerative change is present in the spine.      9/17/21 : MR Foot No Cont, Right (09.17.21 @ 13:04) : Resection at the mid aspect of the fifth metatarsal. Lateral soft tissue wound with marrow signal abnormality throughout the fifth metatarsal and within the adjacent fourth metatarsal head which is nonspecific in the setting of recent surgery although osteomyelitis is suspected.    9/16/21 : Xray Foot AP + Lateral + Oblique, Right (09.16.21 @ 15:03) : No acute finding. No plain film evidence of osteomyelitis.        MICROBIOLOGY DATA:    Urine Microscopic-Add On (NC) (09.22.21 @ 16:14)   Red Blood Cell - Urine: 0-2 /HPF   White Blood Cell - Urine: 3-5 /HPF   Bacteria: Moderate /HPF   Comment - Urine: yeast cells present   Epithelial Cells: Moderate /HPF     Culture - Tissue with Gram Stain (09.22.21 @ 13:54)   Gram Stain: No polymorphonuclear cells seen per low power field   No organisms seen per oil power field   Specimen Source: .Tissue Right 5th toe r/o osteomyeltis   Culture Results: No growth     COVID-19 David Domain Antibody (09.18.21 @ 12:55)   COVID-19 David Domain Antibody Result: >250.00:    Culture - Blood (09.17.21 @ 10:28)   Specimen Source: .Blood Blood-Peripheral   Culture Results: No growth to date.     Culture - Blood (09.17.21 @ 10:28)   Specimen Source: .Blood Blood-Venous   Culture Results: No growth to date.     Culture - Surgical Swab (09.16.21 @ 21:42)   - Amikacin: S <=16   - Amikacin: S <=16   - Amoxicillin/Clavulanic Acid: S <=8/4   - Ampicillin: R >16 These ampicillin results predict results for amoxicillin   - Ampicillin: S <=2 Predicts results to ampicillin/sulbactam, amoxacillin-clavulanate and piperacillin-tazobactam.   - Ampicillin/Sulbactam: S 8/4 Enterobacter, Citrobacter, and Serratia may develop resistance during prolonged therapy (3-4 days)   - Ampicillin/Sulbactam: R >16/8   - Aztreonam: S <=4   - Aztreonam: S <=4   - Cefazolin: R 16 Enterobacter, Citrobacter, and Serratia may develop resistance during prolonged therapy (3-4 days)   - Cefazolin: R >16   - Cefepime: S <=2   - Cefepime: S <=2   - Cefoxitin: S <=8   - Cefoxitin: S <=8   - Ceftazidime: S <=1   - Ceftriaxone: S <=1   - Ceftriaxone: S <=1 Enterobacter, Citrobacter, and Serratia may develop resistance during prolonged therapy   - Ciprofloxacin: S <=0.25   - Ciprofloxacin: S <=0.25   - Ertapenem: S <=0.5   - Gentamicin: S <=2   - Gentamicin: S <=2   - Imipenem: S <=1   - Levofloxacin: S <=0.5   - Levofloxacin: S <=0.5   - Meropenem: S <=1   - Meropenem: S <=1   - Piperacillin/Tazobactam: S <=8   - Piperacillin/Tazobactam: S <=8   - Tetra/Doxy: S 4   - Tobramycin: S <=2   - Trimethoprim/Sulfamethoxazole: S <=0.5/9.5   - Trimethoprim/Sulfamethoxazole: S <=0.5/9.5   - Vancomycin: S 2   Specimen Source: .Surgical Swab right foot wound   Culture Results:   Culture yields >4 types of aerobic and/or anaerobic bacteria   Call client services within 7 days if further workup is clinically   indicated. Culture includes   Rare Aeromonas hydrophila/caviae   Few Klebsiella oxytoca/Raoutella ornithinolytica   Few Enterococcus faecalis   Few Streptococcus agalactiae (Group B) isolated   Group B streptococci are susceptible to ampicillin,   penicillin and cefazolin, but may be resistant to   erythromycin and clindamycin.   Recommendations for intrapartum prophylaxis for Group B   streptococci are penicillin or ampicillin.   Organism Identification: Aeromonas hydrophila/caviae   Klebsiella oxytoca /Raoutella ornithinolytica   Enterococcus faecalis   Organism: Aeromonas hydrophila/caviae   Organism: Klebsiella oxytoca /Raoutella ornithinolytica   Organism: Enterococcus faecalis   COVID-19 PCR . (09.16.21 @ 22:24)   COVID-19 PCR: NotDetec:           Patient is seen and examined at the bed side, is afebrile. The events noted regarding elevated heart rate and changing type of pressor.  The kidney function started to improve.       REVIEW OF SYSTEMS: Unable to obtain due to mental status      ALLERGIES: No Known Allergies      ICU Vital Signs Last 24 Hrs  T(C): 36.9 (09 Oct 2021 15:00), Max: 37 (08 Oct 2021 23:00)  T(F): 98.4 (09 Oct 2021 15:00), Max: 98.6 (08 Oct 2021 23:00)  HR: 62 (09 Oct 2021 17:15) (54 - 137)  BP: 120/48 (09 Oct 2021 17:15) (78/58 - 135/61)  BP(mean): 66 (09 Oct 2021 17:15) (52 - 101)  ABP: --  ABP(mean): --  RR: 13 (09 Oct 2021 17:15) (12 - 23)  SpO2: 100% (09 Oct 2021 17:15) (80% - 100%)      PHYSICAL EXAM:  GENERAL: Intubated/vented  CHEST/LUNG:  Not using accessory muscles  HEART: s1 and s2 present  ABDOMEN:  Mild distended   EXTREMITIES: Right foot bandage in placed   CNS: Intubated/ vented      LABS:                        8.5    8.66  )-----------( 178      ( 09 Oct 2021 12:22 )             26.8                           8.7    10.30 )-----------( 244      ( 08 Oct 2021 03:52 )             27.1       10-09    147<H>  |  109<H>  |  34<H>  ----------------------------<  85  3.0<L>   |  28  |  2.45<H>    Ca    8.0<L>      09 Oct 2021 04:06  Phos  4.0     10-09  Mg     2.0     10-09    TPro  5.8<L>  /  Alb  2.2<L>  /  TBili  0.6  /  DBili  x   /  AST  525<H>  /  ALT  479<H>  /  AlkPhos  92  10-09    10-08    144  |  107  |  38<H>  ----------------------------<  93  3.8   |  26  |  3.03<H>    Ca    8.5      08 Oct 2021 03:52  Phos  6.4     10-08  Mg     2.4     10-08    TPro  6.9  /  Alb  3.1<L>  /  TBili  0.6  /  DBili  x   /  AST  1057<H>  /  ALT  493<H>  /  AlkPhos  99  10-08    09-25    139  |  107  |  41<H>  ----------------------------<  134<H>  4.1   |  21<L>  |  4.05<H>    Ca    8.6      25 Sep 2021 06:40    TPro  6.6  /  Alb  2.3<L>  /  TBili  0.5  /  DBili  x   /  AST  25  /  ALT  15  /  AlkPhos  102  09-25        CAPILLARY BLOOD GLUCOSE  POCT Blood Glucose.: 134 mg/dL (17 Sep 2021 17:11)  POCT Blood Glucose.: 128 mg/dL (17 Sep 2021 11:06)  POCT Blood Glucose.: 157 mg/dL (17 Sep 2021 04:10)      MEDICATIONS  (STANDING):    aspirin  chewable 81 milliGRAM(s) Oral daily  atorvastatin 80 milliGRAM(s) Oral at bedtime  chlorhexidine 0.12% Liquid 15 milliLiter(s) Oral Mucosa every 12 hours  chlorhexidine 2% Cloths 1 Application(s) Topical <User Schedule>  cyanocobalamin 1000 MICROGram(s) Oral daily  ergocalciferol 58735 Unit(s) Oral <User Schedule>  fentaNYL   Infusion 0.5 MICROgram(s)/kG/Hr (4.35 mL/Hr) IV Continuous <Continuous>  ferrous    sulfate 325 milliGRAM(s) Oral daily  finasteride 5 milliGRAM(s) Oral daily  heparin  Infusion.  Unit(s)/Hr (16 mL/Hr) IV Continuous <Continuous>  insulin lispro (ADMELOG) corrective regimen sliding scale   SubCutaneous Before meals and at bedtime  meropenem  IVPB 500 milliGRAM(s) IV Intermittent every 12 hours  pantoprazole  Injectable 40 milliGRAM(s) IV Push at bedtime  phenylephrine    Infusion 0.5 MICROgram(s)/kG/Min (8.15 mL/Hr) IV Continuous <Continuous>  propofol Infusion 30 MICROgram(s)/kG/Min (15.6 mL/Hr) IV Continuous <Continuous>  tamsulosin 0.4 milliGRAM(s) Oral at bedtime  vancomycin  IVPB 1250 milliGRAM(s) IV Intermittent daily      RADIOLOGY & ADDITIONAL TESTS:    10/5/21 CT Chest No Cont (10.05.21 @ 14:07) Pulmonary edema with bilateral moderate to large pleural effusions. Underlying bilateral lower lobe compressive atelectasis versus pneumonia.  Mildly enlarged mediastinal lymph nodes, possibly reactive.      10/2/21: Xray Chest 1 View- PORTABLE-Routine (Xray Chest 1 View- PORTABLE-Routine in AM.) (10.02.21 @ 09:46) There is persistent bilateral perihilar/basilar diffuse airspace disease and/or RIGHT effusion.  Cardiomegaly.  No interval change.    Collected Date/Time: 9/22/2021 08:42 EDT   Received Date/Time: 9/23/2021 09:29 EDT   Surgical Pathology Report - Auth (Verified)   Specimen(s) Submitted   1 Right 5th Toe Wound Debridement r/o Osteomyelitis   Final Diagnosis   Right fifth toe; wound debridement:   - Bone with acute osteomyelitis and necrotic periosteal tissue.     9/28/21: US Abdomen Upper Quadrant Right (09.28.21 @ 12:13) Cholelithiasis without evidence of acute cholecystitis. Note is made of right pleural effusion    9/27/21: CT Abdomen and Pelvis w/ Oral Cont (09.27.21 @ 15:53) >No acute intra-abdominal pathology.    Small bilateral pleural effusions with compressive atelectasis of both lower lobes.    9/26/21: Xray Chest 1 View-PORTABLE IMMEDIATE (Xray Chest 1 View-PORTABLE IMMEDIATE .) (09.26.21 @ 15:28) : Small bilateral pleural effusions and mild pulmonary edema. A right lower lobe pneumonia is not excluded.      9/26/21: Xray Abdomen 1 View Portable, IMMEDIATE (Xray Abdomen 1 View Portable, IMMEDIATE .) (09.26.21 @ 15:28) : There is a nasogastric tube with its tip in the stomach. There is gaseous distention of the colon. No pathologic calcifications are seen. The osseous structures are intact with degenerative change is present in the spine.      9/17/21 : MR Foot No Cont, Right (09.17.21 @ 13:04) : Resection at the mid aspect of the fifth metatarsal. Lateral soft tissue wound with marrow signal abnormality throughout the fifth metatarsal and within the adjacent fourth metatarsal head which is nonspecific in the setting of recent surgery although osteomyelitis is suspected.    9/16/21 : Xray Foot AP + Lateral + Oblique, Right (09.16.21 @ 15:03) : No acute finding. No plain film evidence of osteomyelitis.        MICROBIOLOGY DATA:    Urine Microscopic-Add On (NC) (09.22.21 @ 16:14)   Red Blood Cell - Urine: 0-2 /HPF   White Blood Cell - Urine: 3-5 /HPF   Bacteria: Moderate /HPF   Comment - Urine: yeast cells present   Epithelial Cells: Moderate /HPF     Culture - Tissue with Gram Stain (09.22.21 @ 13:54)   Gram Stain: No polymorphonuclear cells seen per low power field   No organisms seen per oil power field   Specimen Source: .Tissue Right 5th toe r/o osteomyeltis   Culture Results: No growth     COVID-19 David Domain Antibody (09.18.21 @ 12:55)   COVID-19 David Domain Antibody Result: >250.00:    Culture - Blood (09.17.21 @ 10:28)   Specimen Source: .Blood Blood-Peripheral   Culture Results: No growth to date.     Culture - Blood (09.17.21 @ 10:28)   Specimen Source: .Blood Blood-Venous   Culture Results: No growth to date.     Culture - Surgical Swab (09.16.21 @ 21:42)   - Amikacin: S <=16   - Amikacin: S <=16   - Amoxicillin/Clavulanic Acid: S <=8/4   - Ampicillin: R >16 These ampicillin results predict results for amoxicillin   - Ampicillin: S <=2 Predicts results to ampicillin/sulbactam, amoxacillin-clavulanate and piperacillin-tazobactam.   - Ampicillin/Sulbactam: S 8/4 Enterobacter, Citrobacter, and Serratia may develop resistance during prolonged therapy (3-4 days)   - Ampicillin/Sulbactam: R >16/8   - Aztreonam: S <=4   - Aztreonam: S <=4   - Cefazolin: R 16 Enterobacter, Citrobacter, and Serratia may develop resistance during prolonged therapy (3-4 days)   - Cefazolin: R >16   - Cefepime: S <=2   - Cefepime: S <=2   - Cefoxitin: S <=8   - Cefoxitin: S <=8   - Ceftazidime: S <=1   - Ceftriaxone: S <=1   - Ceftriaxone: S <=1 Enterobacter, Citrobacter, and Serratia may develop resistance during prolonged therapy   - Ciprofloxacin: S <=0.25   - Ciprofloxacin: S <=0.25   - Ertapenem: S <=0.5   - Gentamicin: S <=2   - Gentamicin: S <=2   - Imipenem: S <=1   - Levofloxacin: S <=0.5   - Levofloxacin: S <=0.5   - Meropenem: S <=1   - Meropenem: S <=1   - Piperacillin/Tazobactam: S <=8   - Piperacillin/Tazobactam: S <=8   - Tetra/Doxy: S 4   - Tobramycin: S <=2   - Trimethoprim/Sulfamethoxazole: S <=0.5/9.5   - Trimethoprim/Sulfamethoxazole: S <=0.5/9.5   - Vancomycin: S 2   Specimen Source: .Surgical Swab right foot wound   Culture Results:   Culture yields >4 types of aerobic and/or anaerobic bacteria   Call client services within 7 days if further workup is clinically   indicated. Culture includes   Rare Aeromonas hydrophila/caviae   Few Klebsiella oxytoca/Raoutella ornithinolytica   Few Enterococcus faecalis   Few Streptococcus agalactiae (Group B) isolated   Group B streptococci are susceptible to ampicillin,   penicillin and cefazolin, but may be resistant to   erythromycin and clindamycin.   Recommendations for intrapartum prophylaxis for Group B   streptococci are penicillin or ampicillin.   Organism Identification: Aeromonas hydrophila/caviae   Klebsiella oxytoca /Raoutella ornithinolytica   Enterococcus faecalis   Organism: Aeromonas hydrophila/caviae   Organism: Klebsiella oxytoca /Raoutella ornithinolytica   Organism: Enterococcus faecalis   COVID-19 PCR . (09.16.21 @ 22:24)   COVID-19 PCR: NotDetec:

## 2021-10-09 NOTE — PROGRESS NOTE ADULT - ASSESSMENT
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 s/p OR debridement today. Nephrology consulted for abrupt rise in SCr.     1. OREN- likely ATN in the setting of infection and ACEi use. Lisinopril discontinued 9/22. ATN was resolving, then worsened on 10/2. s/p CT chest with b/l mod to large pleural effusion R>L. s/p rt pigtail catheter. Renal function with mild improvement s/p Lasix 40mg IV x1 10/8.  Repeat Urine lytes; wong check FeUrea.   Kidney/ Bladder US with large distended bladder s/p ibrahim placement on 9/23, then removed. s/p bladder scan 10/4 with 1L urine for which ibrahim was reinserted; however; renal function did not improve post ibrahim; less likely due to obstructive uropathy.  No peripheral eosinophilia- no signs of AIN. C3 & C4 wnl with neg ASO- no signs of PIGN. Strict I/Os. Avoid nephrotoxins/ NSAIDs/ RCA. Monitor BMP.    2. 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 Will defer secondary w/u as an outpt. Avoid nephrotoxins  3. HTN 2/2 CKD- Now on pressors post intubation/ on propofol. Pressors as per ICU. Monitor BP  4. Acute osteomyelitis- s/p debridement 9/22. Pt now on Vanco and meropenem (renally dosed). Monitor Vanco trough. Plan as per ID      [facility] NEPHROLOGY  Bryn Johnson M.D.  Domingo Booth D.O.  Zakia Rodriguez M.D.  Zonia Adams, MSN, ANP-C  [phone]    [address] [place] [address]

## 2021-10-09 NOTE — PROGRESS NOTE ADULT - SUBJECTIVE AND OBJECTIVE BOX
[facility] NEPHROLOGY- PROGRESS NOTE    Patient is a 75yo Male with DM on insulin, HTN, HLD, CAD, s/p R partial 5th ray amputation 8/19/2021 p/w R foot pain and foul drainage a/w diabetic foot ulcer suspicious for osteomyelitis. s/p OR debridement today. Nephrology consulted for abrupt rise in SCr.   s/p ibrahim placement for distended bladder on 9/23 with ~400ml of urine o/p  9/27- pt with SOB; CXR with pulmonary edema- pt given Lasix 80mg IV x1. Concern for aspiration PNA  Course BP: s/p lasix 60mg IV on 10/1 & 10/2 and s/p Lasix 40mg IV x1 today 10/4. Bladder scan with 1L urine; s/p ibrahim reinserted  Transferred to ICU for acute hypoxic respiratory failure, s/p intubated on 10/7    Hospital Medications: Medications reviewed.  REVIEW OF SYSTEMS: Unable to obtain; sedated and intubated    VITALS:  T(F): 98.4 (10-09-21 @ 15:00), Max: 98.6 (10-08-21 @ 23:00)  HR: 62 (10-09-21 @ 17:15)  BP: 120/48 (10-09-21 @ 17:15)  RR: 13 (10-09-21 @ 17:15)  SpO2: 100% (10-09-21 @ 17:15)  Wt(kg): --    10-08 @ 07:01  -  10-09 @ 07:00  --------------------------------------------------------  IN: 832.3 mL / OUT: 3595 mL / NET: -2762.7 mL    10-09 @ 07:01  -  10-09 @ 17:20  --------------------------------------------------------  IN: 768.2 mL / OUT: 575 mL / NET: 193.2 mL      PHYSICAL EXAM:  Gen: Sedated  HEENT: +ETT  Cards: RRR, +S1/S2, +TRINIDAD  Resp: +mechanical BS, +rt pigtail catheter  GI: soft,   : +ibrahim  Extremities: no LE edema B/L  Derm: Rt foot wrapped with wound vac    LABS:  10-09  147 <--, 144 <--, 143 <--, 144 <--, 143 <--, 143 <--, 141 <--  147<H>  |  109<H>  |  34<H>  ----------------------------<  85  3.0<L>   |  28  |  2.45<H>    Ca    8.0<L>      09 Oct 2021 04:06  Phos  4.0     10-09  Mg     2.0     10-09    TPro  5.8<L>  /  Alb  2.2<L>  /  TBili  0.6  /  DBili      /  AST  525<H>  /  ALT  479<H>  /  AlkPhos  92  10-09    Creatinine Trend: 2.45 <--, 3.03 <--, 2.12 <--, 2.02 <--, 2.11 <--, 2.17 <--, 2.13 <--, 1.70 <--                        8.5    8.66  )-----------( 178      ( 09 Oct 2021 12:22 )             26.8     Urine Studies:    Creatinine, Random Urine: 123 mg/dL (10-05 @ 12:43)  Chloride, Random Urine: <10 mmol/L (10-05 @ 12:43)  Sodium, Random Urine: 44 mmol/L (10-05 @ 12:43)

## 2021-10-09 NOTE — PROGRESS NOTE ADULT - ATTENDING COMMENTS
IMP: This is a  76 yr old man from home, lives with daughter with DM- uncontrol  on insulin, HTN, HLD, CAD, PSH R partial 5th ray amputation 8/19/2021 p/w R foot pain x1 day associated with foul smelling discharge due to osteomyelitis beig treated with iv antibx .  Pulmonary evaluation requested for hypoxia  with pleural effusion         Assessment:  - Acute Hypoxic Respiratory Failure  - Pulmonary Edema  - Pleural Effusion   - Possible  Aspiration PNA   - Osteomyelitis of right foot   - OREN superimposed on CKD  - Severe AS   - CAD s/p CABG   - Cholelithiasis   - HTN  - HLD  - DM uncontrol   - Diabetic foot ulcer      Plan   -new onset afib over night with LBBB  -start therapeutic anticoag   -trend trop   -Cards eval  -echo pending   -intubated 10/7 after failing trial of BiPaP  -right pig chest tube 10/8.. drained 1000 ml   -Septic shock   -titrate off vasopressors  to maintain MAP>65  -midodrine   -will need BiPaP vs intubation   -keep chest tube to suction   -hemodynamic monitoring   -LFT trending up  (was  on levaquin and Unasyn)  -continue antibx changed to Meropenem and vanco iv   -monitor blood sugar with coverage   -advance directives  -DVT/ GI prphy . IMP: This is a  76 yr old man from home, lives with daughter with DM- uncontrol  on insulin, HTN, HLD, CAD, PSH R partial 5th ray amputation 8/19/2021 p/w R foot pain x1 day associated with foul smelling discharge due to osteomyelitis beig treated with iv antibx .  Pulmonary evaluation requested for hypoxia  with pleural effusion         Assessment:  - Acute Hypoxic Respiratory Failure  - Pulmonary Edema  - Pleural Effusion   - Possible  Aspiration PNA   - Osteomyelitis of right foot   - OREN superimposed on CKD  - Severe AS   - CAD s/p CABG   - Cholelithiasis   - HTN  - HLD  - DM uncontrol   - Diabetic foot ulcer      Plan   -new onset afib over night with LBBB  -start therapeutic anticoag   -trend trop   -Cards eval  -echo pending   -intubated 10/7 after failing trial of BiPaP  -right pig chest tube 10/8.. drained 1000 ml   -Septic shock   -no beta- or ACE - due to shock   -add ASA , continue statin   -titrate off vasopressors  to maintain MAP>65  -midodrine   -will need BiPaP vs intubation   -keep chest tube to suction   -hemodynamic monitoring   -LFT trending up  (was  on levaquin and Unasyn)  -continue antibx changed to Meropenem and vanco iv   -monitor blood sugar with coverage   -advance directives  -DVT/ GI prphy .

## 2021-10-09 NOTE — PROCEDURE NOTE - NSINDICATIONS_GEN_A_CORE
pleural effusion
airway protection/critical patient
venous access/volume resuscitation
venous access/volume resuscitation

## 2021-10-09 NOTE — PROCEDURE NOTE - NSPOSTCAREGUIDE_GEN_A_CORE
Care for catheter as per unit/ICU protocols
Care for catheter as per unit/ICU protocols
Verbal/written post procedure instructions were given to patient/caregiver
8

## 2021-10-09 NOTE — PROGRESS NOTE ADULT - ASSESSMENT
77 y/o male b/l Pleural effusion, right greater than left, s/p R pigtail placement 10/8.     -Continue pigtail to waterseal  -Monitor output  -Daily CXR   -Continue care as per ICU team   -Discussed with Dr. Caceres who agrees

## 2021-10-10 LAB
ALBUMIN SERPL ELPH-MCNC: 2.4 G/DL — LOW (ref 3.5–5)
ALP SERPL-CCNC: 146 U/L — HIGH (ref 40–120)
ALT FLD-CCNC: 559 U/L DA — HIGH (ref 10–60)
ANION GAP SERPL CALC-SCNC: 6 MMOL/L — SIGNIFICANT CHANGE UP (ref 5–17)
APTT BLD: 119.3 SEC — HIGH (ref 27.5–35.5)
APTT BLD: 120 SEC — CRITICAL HIGH (ref 27.5–35.5)
APTT BLD: 82.9 SEC — HIGH (ref 27.5–35.5)
APTT BLD: 98.4 SEC — HIGH (ref 27.5–35.5)
AST SERPL-CCNC: 489 U/L — HIGH (ref 10–40)
BASE EXCESS BLDA CALC-SCNC: 2.6 MMOL/L — SIGNIFICANT CHANGE UP (ref -2–3)
BASOPHILS # BLD AUTO: 0.03 K/UL — SIGNIFICANT CHANGE UP (ref 0–0.2)
BASOPHILS NFR BLD AUTO: 0.5 % — SIGNIFICANT CHANGE UP (ref 0–2)
BILIRUB SERPL-MCNC: 0.5 MG/DL — SIGNIFICANT CHANGE UP (ref 0.2–1.2)
BLOOD GAS COMMENTS ARTERIAL: SIGNIFICANT CHANGE UP
BUN SERPL-MCNC: 32 MG/DL — HIGH (ref 7–18)
CALCIUM SERPL-MCNC: 8.2 MG/DL — LOW (ref 8.4–10.5)
CHLORIDE SERPL-SCNC: 110 MMOL/L — HIGH (ref 96–108)
CO2 SERPL-SCNC: 30 MMOL/L — SIGNIFICANT CHANGE UP (ref 22–31)
CREAT SERPL-MCNC: 1.95 MG/DL — HIGH (ref 0.5–1.3)
EOSINOPHIL # BLD AUTO: 0.22 K/UL — SIGNIFICANT CHANGE UP (ref 0–0.5)
EOSINOPHIL NFR BLD AUTO: 3.6 % — SIGNIFICANT CHANGE UP (ref 0–6)
GLUCOSE SERPL-MCNC: 110 MG/DL — HIGH (ref 70–99)
HCO3 BLDA-SCNC: 27 MMOL/L — SIGNIFICANT CHANGE UP (ref 21–28)
HCT VFR BLD CALC: 23 % — LOW (ref 39–50)
HCT VFR BLD CALC: 26.5 % — LOW (ref 39–50)
HGB BLD-MCNC: 7.3 G/DL — LOW (ref 13–17)
HGB BLD-MCNC: 8.6 G/DL — LOW (ref 13–17)
HOROWITZ INDEX BLDA+IHG-RTO: 40 — SIGNIFICANT CHANGE UP
IMM GRANULOCYTES NFR BLD AUTO: 0.3 % — SIGNIFICANT CHANGE UP (ref 0–1.5)
INR BLD: 1.35 RATIO — HIGH (ref 0.88–1.16)
LYMPHOCYTES # BLD AUTO: 1.15 K/UL — SIGNIFICANT CHANGE UP (ref 1–3.3)
LYMPHOCYTES # BLD AUTO: 19.1 % — SIGNIFICANT CHANGE UP (ref 13–44)
MAGNESIUM SERPL-MCNC: 2.3 MG/DL — SIGNIFICANT CHANGE UP (ref 1.6–2.6)
MCHC RBC-ENTMCNC: 29.9 PG — SIGNIFICANT CHANGE UP (ref 27–34)
MCHC RBC-ENTMCNC: 30.5 PG — SIGNIFICANT CHANGE UP (ref 27–34)
MCHC RBC-ENTMCNC: 31.7 GM/DL — LOW (ref 32–36)
MCHC RBC-ENTMCNC: 32.5 GM/DL — SIGNIFICANT CHANGE UP (ref 32–36)
MCV RBC AUTO: 94 FL — SIGNIFICANT CHANGE UP (ref 80–100)
MCV RBC AUTO: 94.3 FL — SIGNIFICANT CHANGE UP (ref 80–100)
MONOCYTES # BLD AUTO: 0.28 K/UL — SIGNIFICANT CHANGE UP (ref 0–0.9)
MONOCYTES NFR BLD AUTO: 4.6 % — SIGNIFICANT CHANGE UP (ref 2–14)
NEUTROPHILS # BLD AUTO: 4.33 K/UL — SIGNIFICANT CHANGE UP (ref 1.8–7.4)
NEUTROPHILS NFR BLD AUTO: 71.9 % — SIGNIFICANT CHANGE UP (ref 43–77)
NRBC # BLD: 0 /100 WBCS — SIGNIFICANT CHANGE UP (ref 0–0)
NRBC # BLD: 0 /100 WBCS — SIGNIFICANT CHANGE UP (ref 0–0)
PCO2 BLDA: 41 MMHG — SIGNIFICANT CHANGE UP (ref 35–48)
PH BLDA: 7.43 — SIGNIFICANT CHANGE UP (ref 7.35–7.45)
PHOSPHATE SERPL-MCNC: 3.7 MG/DL — SIGNIFICANT CHANGE UP (ref 2.5–4.5)
PLATELET # BLD AUTO: 150 K/UL — SIGNIFICANT CHANGE UP (ref 150–400)
PLATELET # BLD AUTO: 170 K/UL — SIGNIFICANT CHANGE UP (ref 150–400)
PO2 BLDA: 203 MMHG — HIGH (ref 83–108)
POTASSIUM SERPL-MCNC: 3.3 MMOL/L — LOW (ref 3.5–5.3)
POTASSIUM SERPL-SCNC: 3.3 MMOL/L — LOW (ref 3.5–5.3)
PROT SERPL-MCNC: 6.3 G/DL — SIGNIFICANT CHANGE UP (ref 6–8.3)
PROTHROM AB SERPL-ACNC: 15.8 SEC — HIGH (ref 10.6–13.6)
RBC # BLD: 2.44 M/UL — LOW (ref 4.2–5.8)
RBC # BLD: 2.82 M/UL — LOW (ref 4.2–5.8)
RBC # FLD: 15.2 % — HIGH (ref 10.3–14.5)
RBC # FLD: 15.2 % — HIGH (ref 10.3–14.5)
SAO2 % BLDA: 100 % — SIGNIFICANT CHANGE UP
SODIUM SERPL-SCNC: 146 MMOL/L — HIGH (ref 135–145)
VANCOMYCIN TROUGH SERPL-MCNC: 14 UG/ML — SIGNIFICANT CHANGE UP (ref 10–20)
WBC # BLD: 6.03 K/UL — SIGNIFICANT CHANGE UP (ref 3.8–10.5)
WBC # BLD: 6.57 K/UL — SIGNIFICANT CHANGE UP (ref 3.8–10.5)
WBC # FLD AUTO: 6.03 K/UL — SIGNIFICANT CHANGE UP (ref 3.8–10.5)
WBC # FLD AUTO: 6.57 K/UL — SIGNIFICANT CHANGE UP (ref 3.8–10.5)

## 2021-10-10 PROCEDURE — 71045 X-RAY EXAM CHEST 1 VIEW: CPT | Mod: 26

## 2021-10-10 PROCEDURE — 99231 SBSQ HOSP IP/OBS SF/LOW 25: CPT

## 2021-10-10 RX ORDER — VANCOMYCIN HCL 1 G
1250 VIAL (EA) INTRAVENOUS DAILY
Refills: 0 | Status: DISCONTINUED | OUTPATIENT
Start: 2021-10-10 | End: 2021-10-11

## 2021-10-10 RX ORDER — MEROPENEM 1 G/30ML
1000 INJECTION INTRAVENOUS EVERY 12 HOURS
Refills: 0 | Status: DISCONTINUED | OUTPATIENT
Start: 2021-10-10 | End: 2021-10-19

## 2021-10-10 RX ORDER — POTASSIUM CHLORIDE 20 MEQ
40 PACKET (EA) ORAL ONCE
Refills: 0 | Status: COMPLETED | OUTPATIENT
Start: 2021-10-10 | End: 2021-10-10

## 2021-10-10 RX ORDER — INSULIN LISPRO 100/ML
VIAL (ML) SUBCUTANEOUS EVERY 6 HOURS
Refills: 0 | Status: DISCONTINUED | OUTPATIENT
Start: 2021-10-10 | End: 2021-10-21

## 2021-10-10 RX ORDER — MEROPENEM 1 G/30ML
500 INJECTION INTRAVENOUS EVERY 12 HOURS
Refills: 0 | Status: DISCONTINUED | OUTPATIENT
Start: 2021-10-10 | End: 2021-10-10

## 2021-10-10 RX ADMIN — PROPOFOL 15.6 MICROGRAM(S)/KG/MIN: 10 INJECTION, EMULSION INTRAVENOUS at 21:30

## 2021-10-10 RX ADMIN — ATORVASTATIN CALCIUM 80 MILLIGRAM(S): 80 TABLET, FILM COATED ORAL at 23:15

## 2021-10-10 RX ADMIN — PREGABALIN 1000 MICROGRAM(S): 225 CAPSULE ORAL at 11:05

## 2021-10-10 RX ADMIN — Medication 1: at 11:06

## 2021-10-10 RX ADMIN — Medication 1: at 16:00

## 2021-10-10 RX ADMIN — CHLORHEXIDINE GLUCONATE 15 MILLILITER(S): 213 SOLUTION TOPICAL at 17:12

## 2021-10-10 RX ADMIN — HEPARIN SODIUM 1200 UNIT(S)/HR: 5000 INJECTION INTRAVENOUS; SUBCUTANEOUS at 13:38

## 2021-10-10 RX ADMIN — CHLORHEXIDINE GLUCONATE 15 MILLILITER(S): 213 SOLUTION TOPICAL at 05:20

## 2021-10-10 RX ADMIN — FINASTERIDE 5 MILLIGRAM(S): 5 TABLET, FILM COATED ORAL at 11:06

## 2021-10-10 RX ADMIN — MEROPENEM 100 MILLIGRAM(S): 1 INJECTION INTRAVENOUS at 05:20

## 2021-10-10 RX ADMIN — Medication 2: at 23:46

## 2021-10-10 RX ADMIN — HEPARIN SODIUM 1200 UNIT(S)/HR: 5000 INJECTION INTRAVENOUS; SUBCUTANEOUS at 07:24

## 2021-10-10 RX ADMIN — HEPARIN SODIUM 1200 UNIT(S)/HR: 5000 INJECTION INTRAVENOUS; SUBCUTANEOUS at 01:17

## 2021-10-10 RX ADMIN — PROPOFOL 15.6 MICROGRAM(S)/KG/MIN: 10 INJECTION, EMULSION INTRAVENOUS at 13:22

## 2021-10-10 RX ADMIN — Medication 40 MILLIEQUIVALENT(S): at 05:19

## 2021-10-10 RX ADMIN — MEROPENEM 100 MILLIGRAM(S): 1 INJECTION INTRAVENOUS at 17:12

## 2021-10-10 RX ADMIN — FENTANYL CITRATE 4.35 MICROGRAM(S)/KG/HR: 50 INJECTION INTRAVENOUS at 08:16

## 2021-10-10 RX ADMIN — Medication 81 MILLIGRAM(S): at 11:05

## 2021-10-10 RX ADMIN — ERGOCALCIFEROL 50000 UNIT(S): 1.25 CAPSULE ORAL at 11:07

## 2021-10-10 RX ADMIN — CHLORHEXIDINE GLUCONATE 1 APPLICATION(S): 213 SOLUTION TOPICAL at 05:19

## 2021-10-10 RX ADMIN — Medication 325 MILLIGRAM(S): at 11:05

## 2021-10-10 RX ADMIN — PROPOFOL 15.6 MICROGRAM(S)/KG/MIN: 10 INJECTION, EMULSION INTRAVENOUS at 05:22

## 2021-10-10 RX ADMIN — MEROPENEM 100 MILLIGRAM(S): 1 INJECTION INTRAVENOUS at 23:46

## 2021-10-10 RX ADMIN — TAMSULOSIN HYDROCHLORIDE 0.4 MILLIGRAM(S): 0.4 CAPSULE ORAL at 23:16

## 2021-10-10 RX ADMIN — Medication 166.67 MILLIGRAM(S): at 13:09

## 2021-10-10 RX ADMIN — PANTOPRAZOLE SODIUM 40 MILLIGRAM(S): 20 TABLET, DELAYED RELEASE ORAL at 23:15

## 2021-10-10 RX ADMIN — FENTANYL CITRATE 4.35 MICROGRAM(S)/KG/HR: 50 INJECTION INTRAVENOUS at 02:16

## 2021-10-10 NOTE — PROGRESS NOTE ADULT - ASSESSMENT
Assessment and Recommendation:   · Assessment	  Assessment:  - Acute Hypoxic Respiratory Failure  - Sepsis 2/2 to Aspiration PNA vs R foot OM   - OREN superimposed on CKD  - Severe AS   - CAD s/p CABG   - Cholelithiasis   - HTN  - HLD    Plan:  Neuro:    - On propofol, 25mcg, will reduce to wake him up  - On Fentanyl drip   - Versed, precedex as needed  -Pupils are not equal and reactive, needs a CT head    CVS:  # Severe Aortic Stenosis   - Echo 8/15  confirmed EF 55-60%, Severe LVH, g1 diastolic dysfunction, and severe AS   -Murmurs on exam, systolic  -Cardiology on board SABIHA once stable    # combined systolic and diastolic heart failure  - CT Chest with pulm edema bilaterally; moderate to large pleural effusions; atelectasis vs PNA   - Echo 8/15  confirmed EF 55-60%, Severe LVH, g1 diastolic dysfunction, and severe AS   - new 10/8/2021  EF 40%  - BNP 19K with worsening overload as above; 9K today pt with poor cardiac reserve worsened by acute infection   - CXR  this admission showing improved R sided pleural effusion with diuresis  - Avoid drugs which increase afterload   -40IV lasix Q6 will switch to once day and transition to 20 daily once net even      #CAD   - S/p CABG, on ASA   - Continue Toprol 100 Qd, Procardia 60XL once satble  - C/w Lasix 40 IV mg Q6h, Albumin 100mg   - Positive trop 1.46->1.550; likely due to increased demand and renal failure.  -Now down trended 1.05  - No active chest pain   - Echo 10/8/2021 EF 40%  - Will need a SABIHA and R+L heart cath once medically optimized  - C/w ASA, statin  - Cardiology Dr. Wilkins     #hypotension  Likely in the setting of intrapulmonary pressures,  CXR looks worse, with pleural effusion and increased vascular marking  Midodrine 5mg stat given 10/8/2021 followed by stress dose, followed by pheny  Intubated   Overnight and this morning MAP 70 on Pheny of 0.3 despite propofol   c/w Albumin   c/w with mechanical ventilation until chest tune and lung function is improved  Hold all BP meds  Switch to levophed due to recent echo    #tachycardia 120s  The EKG and tele record A-fib with RVR, and left bundle   Afib likely new, old EKGs say sinus rhythm with nonspecific IVCD, LAPB  Hx of CABG  AC held for chest tube yesterday  Will resume today, Dr. Wilkins on board will be notified          Pulm:  - Acute Hypoxic/ Hypercapnic Resp Failure 2/2  1. RLL PNA vs  2. Right pleural effusions vs  3. CHF    - Patient was on Pendant, saturating 90% on 10L pendant, but having increased WOB, tachypnea   - Possible left lower lobe pneumonia and bilateral pleural effusions on CT Chest   - ABG was showing 7.49/33/52/25-> respiratory alkalosis with hypoxic   -ICU  Bipap 10/5 for hypoxia and work of breathing,  -Got intubated 10/6/2021  -pt also had CABG hx and BNP 19k - 9K this am=dmission  - Per surgery, Tello catheter placed without issue and drained 1000cc immediately and clamped.  - Sent fluid for tests/ cultures LDH, cytology..  -c/w lasix 40IV daily  -c/w with abx ( Vanc and Meropenem )   - avoid fluid overloading   -Vent set 18/450/5/40 sats 100 may be SBT'd today    ID:  - Empiric Aspiration PNA coverage, R foot osteomyelitis, and Funguria   - Blood Cultures -ve till date, Bone and wound culture: showing Aeromonas, GBS, Klebsiella, Enterococcus   -Cultures from 9/22/2021, tissue and blood 9/17 negative  - UCX showing yeast - one source  - On Unasyn, Levaquin, Diflucan; renally dosed - all >12 days  -DC diflucan as it is only one source, also given today's LFTs  -DC Levaquin  -Continue on vanc and faustino day 2    Nephro:  - Ibrahim placed for possible obstruction   - OREN on CKD stage III: ATN was resolving, however renal fxn now worsened.   - Kidney/ Bladder US with large distended bladder s/p ibrahim placement on 9/23, then removed.   - S/p bladder scan 10/4 with 1L urine for which Ibrahim was reinserted  - FeUrea: 20.5% 9/23:   -Cr 2.02 down trended then 2.12, 3.02 BUN/ cr ratio 12 today most likely ATN from Hypotension  - Less likely due to obstructive uropathy.  No peripheral eosinophilia- no signs of AIN.   - Avoid nephrotoxins/ NSAIDs/ RCA. Monitor BMP.  -UOP now -ve   -Nephrology on board Dr. Rodriguez     GI:  -RLL likely aspiration  - Had an Aspiration event after NG tube placed and subsequently removed by pt  - Aspiration precautions, speech and swallow recs, based on cineesophagogram  - Cholelithiasis noted on RUQ US   - Patient is having 2 lose stools daily  -however patient is now intubated   - NPO with tube feed   -dc senna    #shock liver  after a day of hypertension  improving  maintain BP  Will pursue RUQ US today    Heme:  - no indication for transfusion currently  - admitted with Hb 12.7 in August; small downtrend in the setting of CKD   - No active bleeding, normocytic   - Fe panel shows deficiency; will hold repletion during acute infection  - Likely component of ACD, CKD  - Tranfuse if Hb<7 or if worsening tachy/HF sx     Endo:  - target CBG < 180 controlled NPO today  -On tube feeds  - C/w Sliding Scale, accuchecks ACHS     Dispo:  - monitor in the ICU   - PT FULL CODE - confirmed w/ Daughter Mrs. Bell Pj        Assessment and Recommendation:   · Assessment	  Assessment:  - Acute Hypoxic Respiratory Failure  - Sepsis 2/2 to Aspiration PNA vs R foot OM   - OREN superimposed on CKD  - Severe AS   - CAD s/p CABG   - Cholelithiasis   - HTN  - HLD    Plan:  Neuro:    - On propofol, 25mcg, will reduce to wake him up  - On Fentanyl drip   - Versed, precedex as needed  -Pupils are not equal and reactive, needs a CT head    CVS:  # Severe Aortic Stenosis   - Echo 8/15  confirmed EF 55-60%, Severe LVH, g1 diastolic dysfunction, and severe AS   -Murmurs on exam, systolic  -Cardiology on board SABIHA once stable    # combined systolic and diastolic heart failure  - CT Chest with pulm edema bilaterally; moderate to large pleural effusions; atelectasis vs PNA   - Echo 8/15  confirmed EF 55-60%, Severe LVH, g1 diastolic dysfunction, and severe AS   - new 10/8/2021  EF 40%  - BNP 19K with worsening overload as above; 9K today pt with poor cardiac reserve worsened by acute infection   - CXR  this admission showing improved R sided pleural effusion with diuresis  - Avoid drugs which increase afterload   -40IV lasix Q6 will switch to once day and transition to 20 daily once net even      #CAD   - S/p CABG, on ASA   - Continue Toprol 100 Qd, Procardia 60XL once satble  - C/w Lasix 40 IV mg Q6h, Albumin 100mg   - Positive trop 1.46->1.550; likely due to increased demand and renal failure.  -Now down trended 1.05  - No active chest pain   - Echo 10/8/2021 EF 40%  - Will need a SABIHA and R+L heart cath once medically optimized  - C/w ASA, statin  - Cardiology Dr. Wilkins     #hypotension  Likely in the setting of intrapulmonary pressures,  CXR looks worse, with pleural effusion and increased vascular marking  Midodrine 5mg stat given 10/8/2021 followed by stress dose, followed by pheny  Intubated   Overnight and this morning MAP 70 on Pheny of 0.3 despite propofol   c/w Albumin   c/w with mechanical ventilation until chest tune and lung function is improved  Hold all BP meds  Switch to levophed due to recent echo    #tachycardia 120s  The EKG and tele record A-fib with RVR, and left bundle   Afib likely new, old EKGs say sinus rhythm with nonspecific IVCD, LAPB  Hx of CABG  AC held for chest tube yesterday  Will resume today, Dr. Wilkins on board will be notified          Pulm:  - Acute Hypoxic/ Hypercapnic Resp Failure 2/2  1. RLL PNA vs  2. Right pleural effusions vs  3. CHF    - Patient was on Pendant, saturating 90% on 10L pendant, but having increased WOB, tachypnea   - Possible left lower lobe pneumonia and bilateral pleural effusions on CT Chest   - ABG was showing 7.49/33/52/25-> respiratory alkalosis with hypoxic   -ICU  Bipap 10/5 for hypoxia and work of breathing,  -Got intubated 10/6/2021  -pt also had CABG hx and BNP 19k - 9K this am=dmission  - Per surgery, Tello catheter placed without issue and drained 1000cc immediately and clamped.  - Sent fluid for tests/ cultures LDH, cytology..  -c/w lasix 40IV daily  -c/w with abx ( Vanc and Meropenem )   - avoid fluid overloading   -Vent set 18/450/5/40 sats 100 may be SBT'd today    ID:  - Empiric Aspiration PNA coverage, R foot osteomyelitis, and Funguria   - Blood Cultures -ve till date, Bone and wound culture: showing Aeromonas, GBS, Klebsiella, Enterococcus   -Cultures from 9/22/2021, tissue and blood 9/17 negative  - UCX showing yeast - one source  - On Unasyn, Levaquin, Diflucan; renally dosed - all >12 days  -DC diflucan as it is only one source, also given today's LFTs  -DC Levaquin  -Continue on vanc and faustino   -f/u vanc trough     Nephro:  - Ibrahim placed for possible obstruction   - OREN on CKD stage III: ATN was resolving, however renal fxn now worsened.   - Kidney/ Bladder US with large distended bladder s/p ibrahim placement on 9/23, then removed.   - S/p bladder scan 10/4 with 1L urine for which Ibrahim was reinserted  - FeUrea: 20.5% 9/23:   -Cr 2.02 down trended then 2.12, 3.02 BUN/ cr ratio 12 today most likely ATN from Hypotension  - Less likely due to obstructive uropathy.  No peripheral eosinophilia- no signs of AIN.   - Avoid nephrotoxins/ NSAIDs/ RCA. Monitor BMP.  -UOP now -ve   -Nephrology on board Dr. Rodriguez     GI:  -RLL likely aspiration  - Had an Aspiration event after NG tube placed and subsequently removed by pt  - Aspiration precautions, speech and swallow recs, based on cineesophagogram  - Cholelithiasis noted on RUQ US   - Patient is having 2 lose stools daily  -however patient is now intubated   - NPO with tube feed   -dc senna    #shock liver  after a day of hypertension  improving  maintain BP  Will pursue RUQ US today    Heme:  - no indication for transfusion currently  - admitted with Hb 12.7 in August; small downtrend in the setting of CKD   - No active bleeding, normocytic   - Fe panel shows deficiency; will hold repletion during acute infection  - Likely component of ACD, CKD  - Tranfuse if Hb<7 or if worsening tachy/HF sx     Endo:  - target CBG < 180 controlled NPO today  -On tube feeds  - C/w Sliding Scale, accuchecks ACHS     Dispo:  - monitor in the ICU   - PT FULL CODE - confirmed w/ Daughter Mrs. Bell Pj

## 2021-10-10 NOTE — PROGRESS NOTE ADULT - SUBJECTIVE AND OBJECTIVE BOX
C A R D I O L O G Y  **********************************    DATE OF SERVICE: 10-10-21    Remains intubated; ros limited.     aspirin  chewable 81 milliGRAM(s) Oral daily  atorvastatin 80 milliGRAM(s) Oral at bedtime  chlorhexidine 0.12% Liquid 15 milliLiter(s) Oral Mucosa every 12 hours  chlorhexidine 2% Cloths 1 Application(s) Topical <User Schedule>  cyanocobalamin 1000 MICROGram(s) Oral daily  ergocalciferol 38128 Unit(s) Oral <User Schedule>  fentaNYL   Infusion 0.5 MICROgram(s)/kG/Hr IV Continuous <Continuous>  ferrous    sulfate 325 milliGRAM(s) Oral daily  finasteride 5 milliGRAM(s) Oral daily  heparin  Infusion.  Unit(s)/Hr IV Continuous <Continuous>  insulin lispro (ADMELOG) corrective regimen sliding scale   SubCutaneous Before meals and at bedtime  meropenem  IVPB 500 milliGRAM(s) IV Intermittent every 12 hours  ondansetron Injectable 4 milliGRAM(s) IV Push three times a day PRN  pantoprazole  Injectable 40 milliGRAM(s) IV Push at bedtime  phenylephrine    Infusion 0.5 MICROgram(s)/kG/Min IV Continuous <Continuous>  propofol Infusion 30 MICROgram(s)/kG/Min IV Continuous <Continuous>  sodium chloride 0.9% lock flush 10 milliLiter(s) IV Push every 1 hour PRN  tamsulosin 0.4 milliGRAM(s) Oral at bedtime  vancomycin  IVPB 1250 milliGRAM(s) IV Intermittent daily                            8.6    6.57  )-----------( 170      ( 10 Oct 2021 06:50 )             26.5       10-10    146<H>  |  110<H>  |  32<H>  ----------------------------<  110<H>  3.3<L>   |  30  |  1.95<H>    Ca    8.2<L>      10 Oct 2021 03:54  Phos  3.7     10-10  Mg     2.3     10-10    TPro  6.3  /  Alb  2.4<L>  /  TBili  0.5  /  DBili  x   /  AST  489<H>  /  ALT  559<H>  /  AlkPhos  146<H>  10-10      CARDIAC MARKERS ( 09 Oct 2021 12:22 )  0.222 ng/mL / x     / x     / x     / x      CARDIAC MARKERS ( 09 Oct 2021 04:06 )  0.297 ng/mL / x     / 281 U/L / x     / <1.0 ng/mL        T(C): 36.5 (10-10-21 @ 10:00), Max: 37.1 (10-09-21 @ 19:01)  HR: 70 (10-10-21 @ 13:00) (61 - 84)  BP: 111/53 (10-10-21 @ 13:00) (96/46 - 136/58)  RR: 14 (10-10-21 @ 13:00) (3 - 19)  SpO2: 100% (10-10-21 @ 13:00) (100% - 100%)  Wt(kg): --    I&O's Summary    09 Oct 2021 07:01  -  10 Oct 2021 07:00  --------------------------------------------------------  IN: 1805.2 mL / OUT: 1545 mL / NET: 260.2 mL    10 Oct 2021 07:01  -  10 Oct 2021 13:56  --------------------------------------------------------  IN: 400.2 mL / OUT: 370 mL / NET: 30.2 mL          HEENT:  (-)icterus (-)pallor  CV: N S1 S2 1/6 TRINIDAD (+)2 Pulses B/l  Resp:  Coarse sounds B/L, normal effort  GI: (+) BS Soft, NT, ND  Lymph:  (-)Edema, (-)obvious lymphadenopathy  Skin: Warm to touch, Normal turgor  Psych: Unable to assess mood and affect    Tele: SR    TTE: < from: Transthoracic Echocardiogram (10.07.21 @ 15:26) >  CONCLUSIONS:  1. Normal mitral valve. Moderate mitral regurgitation.  2. Calcified aortic valve with decreased opening, cannot  exclude bicuspid. Peak transaortic valve gradient equals  32.4 mm Hg, mean transaortic valve gradient equals 19 mm  Hg, dimensionless index 0.22, estimated aortic valve area  equals 0.6sqcm (by continuity equation), consistent with  paradoxical low-flow low-gradient severe aortic stenosis.  Consider dobutamine stress echocardiogram and/or SABIHA for  further evaluation.  No aortic valve regurgitation seen.  3. Normal aortic root.  4. Normal left atrium.  5. Moderate concentric left ventricular hypertrophy.  6. Moderate global left ventricular systolic dysfunction  (EF 40-45% by visual estimation).  7. Grade II diastolic dysfunction (moderate).  8. Normal right atrium.  9. Normal right ventricular size with decreased RV systolic  function (TAPSE 1.2 cm).  10. RV systolic pressure is borderline normal at  34 mm Hg.  11. Tricuspid valve not well seen. Trace tricuspid  regurgitation.  12. Normal pulmonic valve. Trace pulmonic insufficiency is  noted.  13. No pericardial effusion.  14. Bilateral pleural effusions.    < end of copied text >    ASSESSMENT/PLAN: 	76y  Male PMH DM on insulin, HTN, HLD, normal lV fx moderate to severe AS 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    - monitor crt, nephrology f/u appreciated  - Abx per primary team  - Echo noted, Severe AS, EF 40-45%   - He will need a SABIHA and R+L heart cath when he recovers and has no active infection  - Cont medical management of CAD  - Pt. with new onset PAF - recommend ac if no contraindications for cva prevention - started on hep gtt - monitor h/h  - follow up thoracic surgery  - vent support per AYAAN Alford MD

## 2021-10-10 NOTE — PROGRESS NOTE ADULT - SUBJECTIVE AND OBJECTIVE BOX
INTERVAL HPI/OVERNIGHT EVENTS:  Patient seen and examined at bedside.   Remains intubated.     MEDICATIONS  (STANDING):  aspirin  chewable 81 milliGRAM(s) Oral daily  atorvastatin 80 milliGRAM(s) Oral at bedtime  chlorhexidine 0.12% Liquid 15 milliLiter(s) Oral Mucosa every 12 hours  chlorhexidine 2% Cloths 1 Application(s) Topical <User Schedule>  cyanocobalamin 1000 MICROGram(s) Oral daily  ergocalciferol 06930 Unit(s) Oral <User Schedule>  fentaNYL   Infusion 0.5 MICROgram(s)/kG/Hr (4.35 mL/Hr) IV Continuous <Continuous>  ferrous    sulfate 325 milliGRAM(s) Oral daily  finasteride 5 milliGRAM(s) Oral daily  heparin  Infusion.  Unit(s)/Hr (16 mL/Hr) IV Continuous <Continuous>  insulin lispro (ADMELOG) corrective regimen sliding scale   SubCutaneous Before meals and at bedtime  meropenem  IVPB 500 milliGRAM(s) IV Intermittent every 12 hours  pantoprazole  Injectable 40 milliGRAM(s) IV Push at bedtime  phenylephrine    Infusion 0.5 MICROgram(s)/kG/Min (8.15 mL/Hr) IV Continuous <Continuous>  propofol Infusion 30 MICROgram(s)/kG/Min (15.6 mL/Hr) IV Continuous <Continuous>  tamsulosin 0.4 milliGRAM(s) Oral at bedtime  vancomycin  IVPB 1250 milliGRAM(s) IV Intermittent daily    MEDICATIONS  (PRN):  ondansetron Injectable 4 milliGRAM(s) IV Push three times a day PRN Nausea and/or Vomiting  sodium chloride 0.9% lock flush 10 milliLiter(s) IV Push every 1 hour PRN Pre/post blood products, medications, blood draw, and to maintain line patency      Vital Signs Last 24 Hrs  T(C): 36.5 (10 Oct 2021 10:00), Max: 37.1 (09 Oct 2021 19:01)  T(F): 97.7 (10 Oct 2021 10:00), Max: 98.7 (09 Oct 2021 19:01)  HR: 71 (10 Oct 2021 09:00) (61 - 84)  BP: 120/55 (10 Oct 2021 10:00) (96/46 - 136/58)  BP(mean): 71 (10 Oct 2021 10:00) (59 - 79)  RR: 14 (10 Oct 2021 10:00) (3 - 19)  SpO2: 100% (10 Oct 2021 10:00) (94% - 100%)    Physical:  General: NAD.  Respirations: Intubated  Chest: Right pigtail in place to suction, small airleak. 100cc serous output overnight     I&O's Detail    09 Oct 2021 07:01  -  10 Oct 2021 07:00  --------------------------------------------------------  IN:    Enteral Tube Flush: 740 mL    FentaNYL: 103.2 mL    Heparin Infusion: 252 mL    IV PiggyBack: 300 mL    Phenylephrine: 170.8 mL    Propofol: 239.2 mL  Total IN: 1805.2 mL    OUT:    Chest Tube (mL): 140 mL    Indwelling Catheter - Urethral (mL): 1405 mL  Total OUT: 1545 mL    Total NET: 260.2 mL      10 Oct 2021 07:01  -  10 Oct 2021 10:41  --------------------------------------------------------  IN:    Enteral Tube Flush: 120 mL    FentaNYL: 12.9 mL    Heparin Infusion: 36 mL    Propofol: 31.2 mL  Total IN: 200.1 mL    OUT:    Indwelling Catheter - Urethral (mL): 255 mL    Phenylephrine: 0 mL  Total OUT: 255 mL    Total NET: -54.9 mL          LABS:                        8.6    6.57  )-----------( 170      ( 10 Oct 2021 06:50 )             26.5             10-10    146<H>  |  110<H>  |  32<H>  ----------------------------<  110<H>  3.3<L>   |  30  |  1.95<H>    Ca    8.2<L>      10 Oct 2021 03:54  Phos  3.7     10-10  Mg     2.3     10-10    TPro  6.3  /  Alb  2.4<L>  /  TBili  0.5  /  DBili  x   /  AST  489<H>  /  ALT  559<H>  /  AlkPhos  146<H>  10-10      CXR: Pending read

## 2021-10-10 NOTE — PROGRESS NOTE ADULT - SUBJECTIVE AND OBJECTIVE BOX
Patient is seen and examined at the bed side, is afebrile.   The kidney function continues  to improve.       REVIEW OF SYSTEMS: Unable to obtain due to mental status      ALLERGIES: No Known Allergies      ICU Vital Signs Last 24 Hrs  T(C): 36.3 (10 Oct 2021 16:00), Max: 37.1 (09 Oct 2021 19:01)  T(F): 97.4 (10 Oct 2021 16:00), Max: 98.7 (09 Oct 2021 19:01)  HR: 67 (10 Oct 2021 18:00) (62 - 84)  BP: 101/47 (10 Oct 2021 18:00) (96/46 - 136/58)  BP(mean): 61 (10 Oct 2021 18:00) (59 - 79)  ABP: --  ABP(mean): --  RR: 14 (10 Oct 2021 18:00) (3 - 19)  SpO2: 100% (10 Oct 2021 18:00) (100% - 100%)      PHYSICAL EXAM:  GENERAL: Intubated/vented  CHEST/LUNG:  Not using accessory muscles  HEART: s1 and s2 present  ABDOMEN:  Mild distended   EXTREMITIES: Right foot bandage in placed   CNS: Intubated/ vented      LABS:                        8.6    6.57  )-----------( 170      ( 10 Oct 2021 06:50 )             26.5                         8.7    10.30 )-----------( 244      ( 08 Oct 2021 03:52 )             27.1       10-10    146<H>  |  110<H>  |  32<H>  ----------------------------<  110<H>  3.3<L>   |  30  |  1.95<H>    Ca    8.2<L>      10 Oct 2021 03:54  Phos  3.7     10-10  Mg     2.3     10-10    TPro  6.3  /  Alb  2.4<L>  /  TBili  0.5  /  DBili  x   /  AST  489<H>  /  ALT  559<H>  /  AlkPhos  146<H>  10-10    10-09    147<H>  |  109<H>  |  34<H>  ----------------------------<  85  3.0<L>   |  28  |  2.45<H>    Ca    8.0<L>      09 Oct 2021 04:06  Phos  4.0     10-09  Mg     2.0     10-09    TPro  5.8<L>  /  Alb  2.2<L>  /  TBili  0.6  /  DBili  x   /  AST  525<H>  /  ALT  479<H>  /  AlkPhos  92  10-09      09-25    139  |  107  |  41<H>  ----------------------------<  134<H>  4.1   |  21<L>  |  4.05<H>    Ca    8.6      25 Sep 2021 06:40    TPro  6.6  /  Alb  2.3<L>  /  TBili  0.5  /  DBili  x   /  AST  25  /  ALT  15  /  AlkPhos  102  09-25        CAPILLARY BLOOD GLUCOSE  POCT Blood Glucose.: 134 mg/dL (17 Sep 2021 17:11)  POCT Blood Glucose.: 128 mg/dL (17 Sep 2021 11:06)  POCT Blood Glucose.: 157 mg/dL (17 Sep 2021 04:10)      MEDICATIONS  (STANDING):    aspirin  chewable 81 milliGRAM(s) Oral daily  atorvastatin 80 milliGRAM(s) Oral at bedtime  chlorhexidine 0.12% Liquid 15 milliLiter(s) Oral Mucosa every 12 hours  chlorhexidine 2% Cloths 1 Application(s) Topical <User Schedule>  cyanocobalamin 1000 MICROGram(s) Oral daily  ergocalciferol 24805 Unit(s) Oral <User Schedule>  fentaNYL   Infusion 0.5 MICROgram(s)/kG/Hr (4.35 mL/Hr) IV Continuous <Continuous>  ferrous    sulfate 325 milliGRAM(s) Oral daily  finasteride 5 milliGRAM(s) Oral daily  heparin  Infusion.  Unit(s)/Hr (16 mL/Hr) IV Continuous <Continuous>  insulin lispro (ADMELOG) corrective regimen sliding scale   SubCutaneous Before meals and at bedtime  meropenem  IVPB 500 milliGRAM(s) IV Intermittent every 12 hours  pantoprazole  Injectable 40 milliGRAM(s) IV Push at bedtime  phenylephrine    Infusion 0.5 MICROgram(s)/kG/Min (8.15 mL/Hr) IV Continuous <Continuous>  propofol Infusion 30 MICROgram(s)/kG/Min (15.6 mL/Hr) IV Continuous <Continuous>  tamsulosin 0.4 milliGRAM(s) Oral at bedtime  vancomycin  IVPB 1250 milliGRAM(s) IV Intermittent daily        RADIOLOGY & ADDITIONAL TESTS:    10/5/21 CT Chest No Cont (10.05.21 @ 14:07) Pulmonary edema with bilateral moderate to large pleural effusions. Underlying bilateral lower lobe compressive atelectasis versus pneumonia.  Mildly enlarged mediastinal lymph nodes, possibly reactive.      10/2/21: Xray Chest 1 View- PORTABLE-Routine (Xray Chest 1 View- PORTABLE-Routine in AM.) (10.02.21 @ 09:46) There is persistent bilateral perihilar/basilar diffuse airspace disease and/or RIGHT effusion.  Cardiomegaly.  No interval change.    Collected Date/Time: 9/22/2021 08:42 EDT   Received Date/Time: 9/23/2021 09:29 EDT   Surgical Pathology Report - Auth (Verified)   Specimen(s) Submitted   1 Right 5th Toe Wound Debridement r/o Osteomyelitis   Final Diagnosis   Right fifth toe; wound debridement:   - Bone with acute osteomyelitis and necrotic periosteal tissue.     9/28/21: US Abdomen Upper Quadrant Right (09.28.21 @ 12:13) Cholelithiasis without evidence of acute cholecystitis. Note is made of right pleural effusion    9/27/21: CT Abdomen and Pelvis w/ Oral Cont (09.27.21 @ 15:53) >No acute intra-abdominal pathology.    Small bilateral pleural effusions with compressive atelectasis of both lower lobes.    9/26/21: Xray Chest 1 View-PORTABLE IMMEDIATE (Xray Chest 1 View-PORTABLE IMMEDIATE .) (09.26.21 @ 15:28) : Small bilateral pleural effusions and mild pulmonary edema. A right lower lobe pneumonia is not excluded.      9/26/21: Xray Abdomen 1 View Portable, IMMEDIATE (Xray Abdomen 1 View Portable, IMMEDIATE .) (09.26.21 @ 15:28) : There is a nasogastric tube with its tip in the stomach. There is gaseous distention of the colon. No pathologic calcifications are seen. The osseous structures are intact with degenerative change is present in the spine.      9/17/21 : MR Foot No Cont, Right (09.17.21 @ 13:04) : Resection at the mid aspect of the fifth metatarsal. Lateral soft tissue wound with marrow signal abnormality throughout the fifth metatarsal and within the adjacent fourth metatarsal head which is nonspecific in the setting of recent surgery although osteomyelitis is suspected.    9/16/21 : Xray Foot AP + Lateral + Oblique, Right (09.16.21 @ 15:03) : No acute finding. No plain film evidence of osteomyelitis.        MICROBIOLOGY DATA:    Urine Microscopic-Add On (NC) (09.22.21 @ 16:14)   Red Blood Cell - Urine: 0-2 /HPF   White Blood Cell - Urine: 3-5 /HPF   Bacteria: Moderate /HPF   Comment - Urine: yeast cells present   Epithelial Cells: Moderate /HPF     Culture - Tissue with Gram Stain (09.22.21 @ 13:54)   Gram Stain: No polymorphonuclear cells seen per low power field   No organisms seen per oil power field   Specimen Source: .Tissue Right 5th toe r/o osteomyeltis   Culture Results: No growth     COVID-19 David Domain Antibody (09.18.21 @ 12:55)   COVID-19 David Domain Antibody Result: >250.00:    Culture - Blood (09.17.21 @ 10:28)   Specimen Source: .Blood Blood-Peripheral   Culture Results: No growth to date.     Culture - Blood (09.17.21 @ 10:28)   Specimen Source: .Blood Blood-Venous   Culture Results: No growth to date.     Culture - Surgical Swab (09.16.21 @ 21:42)   - Amikacin: S <=16   - Amikacin: S <=16   - Amoxicillin/Clavulanic Acid: S <=8/4   - Ampicillin: R >16 These ampicillin results predict results for amoxicillin   - Ampicillin: S <=2 Predicts results to ampicillin/sulbactam, amoxacillin-clavulanate and piperacillin-tazobactam.   - Ampicillin/Sulbactam: S 8/4 Enterobacter, Citrobacter, and Serratia may develop resistance during prolonged therapy (3-4 days)   - Ampicillin/Sulbactam: R >16/8   - Aztreonam: S <=4   - Aztreonam: S <=4   - Cefazolin: R 16 Enterobacter, Citrobacter, and Serratia may develop resistance during prolonged therapy (3-4 days)   - Cefazolin: R >16   - Cefepime: S <=2   - Cefepime: S <=2   - Cefoxitin: S <=8   - Cefoxitin: S <=8   - Ceftazidime: S <=1   - Ceftriaxone: S <=1   - Ceftriaxone: S <=1 Enterobacter, Citrobacter, and Serratia may develop resistance during prolonged therapy   - Ciprofloxacin: S <=0.25   - Ciprofloxacin: S <=0.25   - Ertapenem: S <=0.5   - Gentamicin: S <=2   - Gentamicin: S <=2   - Imipenem: S <=1   - Levofloxacin: S <=0.5   - Levofloxacin: S <=0.5   - Meropenem: S <=1   - Meropenem: S <=1   - Piperacillin/Tazobactam: S <=8   - Piperacillin/Tazobactam: S <=8   - Tetra/Doxy: S 4   - Tobramycin: S <=2   - Trimethoprim/Sulfamethoxazole: S <=0.5/9.5   - Trimethoprim/Sulfamethoxazole: S <=0.5/9.5   - Vancomycin: S 2   Specimen Source: .Surgical Swab right foot wound   Culture Results:   Culture yields >4 types of aerobic and/or anaerobic bacteria   Call client services within 7 days if further workup is clinically   indicated. Culture includes   Rare Aeromonas hydrophila/caviae   Few Klebsiella oxytoca/Raoutella ornithinolytica   Few Enterococcus faecalis   Few Streptococcus agalactiae (Group B) isolated   Group B streptococci are susceptible to ampicillin,   penicillin and cefazolin, but may be resistant to   erythromycin and clindamycin.   Recommendations for intrapartum prophylaxis for Group B   streptococci are penicillin or ampicillin.   Organism Identification: Aeromonas hydrophila/caviae   Klebsiella oxytoca /Raoutella ornithinolytica   Enterococcus faecalis   Organism: Aeromonas hydrophila/caviae   Organism: Klebsiella oxytoca /Raoutella ornithinolytica   Organism: Enterococcus faecalis   COVID-19 PCR . (09.16.21 @ 22:24)   COVID-19 PCR: NotDetec:           Patient is seen and examined at the bed side, is afebrile. He remains intubated/sedated.  The kidney function continues  to improve.       REVIEW OF SYSTEMS: Unable to obtain due to mental status      ALLERGIES: No Known Allergies      ICU Vital Signs Last 24 Hrs  T(C): 36.3 (10 Oct 2021 16:00), Max: 37.1 (09 Oct 2021 19:01)  T(F): 97.4 (10 Oct 2021 16:00), Max: 98.7 (09 Oct 2021 19:01)  HR: 67 (10 Oct 2021 18:00) (62 - 84)  BP: 101/47 (10 Oct 2021 18:00) (96/46 - 136/58)  BP(mean): 61 (10 Oct 2021 18:00) (59 - 79)  ABP: --  ABP(mean): --  RR: 14 (10 Oct 2021 18:00) (3 - 19)  SpO2: 100% (10 Oct 2021 18:00) (100% - 100%)      PHYSICAL EXAM:  GENERAL: Intubated/vented  CHEST/LUNG:  Not using accessory muscles  HEART: s1 and s2 present  ABDOMEN:  Mild distended   EXTREMITIES: Right foot bandage in placed   CNS: Intubated/ vented      LABS:                        8.6    6.57  )-----------( 170      ( 10 Oct 2021 06:50 )             26.5                         8.7    10.30 )-----------( 244      ( 08 Oct 2021 03:52 )             27.1       10-10    146<H>  |  110<H>  |  32<H>  ----------------------------<  110<H>  3.3<L>   |  30  |  1.95<H>    Ca    8.2<L>      10 Oct 2021 03:54  Phos  3.7     10-10  Mg     2.3     10-10    TPro  6.3  /  Alb  2.4<L>  /  TBili  0.5  /  DBili  x   /  AST  489<H>  /  ALT  559<H>  /  AlkPhos  146<H>  10-10    10-09    147<H>  |  109<H>  |  34<H>  ----------------------------<  85  3.0<L>   |  28  |  2.45<H>    Ca    8.0<L>      09 Oct 2021 04:06  Phos  4.0     10-09  Mg     2.0     10-09    TPro  5.8<L>  /  Alb  2.2<L>  /  TBili  0.6  /  DBili  x   /  AST  525<H>  /  ALT  479<H>  /  AlkPhos  92  10-09      09-25    139  |  107  |  41<H>  ----------------------------<  134<H>  4.1   |  21<L>  |  4.05<H>    Ca    8.6      25 Sep 2021 06:40    TPro  6.6  /  Alb  2.3<L>  /  TBili  0.5  /  DBili  x   /  AST  25  /  ALT  15  /  AlkPhos  102  09-25        CAPILLARY BLOOD GLUCOSE  POCT Blood Glucose.: 134 mg/dL (17 Sep 2021 17:11)  POCT Blood Glucose.: 128 mg/dL (17 Sep 2021 11:06)  POCT Blood Glucose.: 157 mg/dL (17 Sep 2021 04:10)      MEDICATIONS  (STANDING):    aspirin  chewable 81 milliGRAM(s) Oral daily  atorvastatin 80 milliGRAM(s) Oral at bedtime  chlorhexidine 0.12% Liquid 15 milliLiter(s) Oral Mucosa every 12 hours  chlorhexidine 2% Cloths 1 Application(s) Topical <User Schedule>  cyanocobalamin 1000 MICROGram(s) Oral daily  ergocalciferol 09038 Unit(s) Oral <User Schedule>  fentaNYL   Infusion 0.5 MICROgram(s)/kG/Hr (4.35 mL/Hr) IV Continuous <Continuous>  ferrous    sulfate 325 milliGRAM(s) Oral daily  finasteride 5 milliGRAM(s) Oral daily  heparin  Infusion.  Unit(s)/Hr (16 mL/Hr) IV Continuous <Continuous>  insulin lispro (ADMELOG) corrective regimen sliding scale   SubCutaneous Before meals and at bedtime  meropenem  IVPB 500 milliGRAM(s) IV Intermittent every 12 hours  pantoprazole  Injectable 40 milliGRAM(s) IV Push at bedtime  phenylephrine    Infusion 0.5 MICROgram(s)/kG/Min (8.15 mL/Hr) IV Continuous <Continuous>  propofol Infusion 30 MICROgram(s)/kG/Min (15.6 mL/Hr) IV Continuous <Continuous>  tamsulosin 0.4 milliGRAM(s) Oral at bedtime  vancomycin  IVPB 1250 milliGRAM(s) IV Intermittent daily        RADIOLOGY & ADDITIONAL TESTS:    10/5/21 CT Chest No Cont (10.05.21 @ 14:07) Pulmonary edema with bilateral moderate to large pleural effusions. Underlying bilateral lower lobe compressive atelectasis versus pneumonia.  Mildly enlarged mediastinal lymph nodes, possibly reactive.      10/2/21: Xray Chest 1 View- PORTABLE-Routine (Xray Chest 1 View- PORTABLE-Routine in AM.) (10.02.21 @ 09:46) There is persistent bilateral perihilar/basilar diffuse airspace disease and/or RIGHT effusion.  Cardiomegaly.  No interval change.    Collected Date/Time: 9/22/2021 08:42 EDT   Received Date/Time: 9/23/2021 09:29 EDT   Surgical Pathology Report - Auth (Verified)   Specimen(s) Submitted   1 Right 5th Toe Wound Debridement r/o Osteomyelitis   Final Diagnosis   Right fifth toe; wound debridement:   - Bone with acute osteomyelitis and necrotic periosteal tissue.     9/28/21: US Abdomen Upper Quadrant Right (09.28.21 @ 12:13) Cholelithiasis without evidence of acute cholecystitis. Note is made of right pleural effusion    9/27/21: CT Abdomen and Pelvis w/ Oral Cont (09.27.21 @ 15:53) >No acute intra-abdominal pathology.    Small bilateral pleural effusions with compressive atelectasis of both lower lobes.    9/26/21: Xray Chest 1 View-PORTABLE IMMEDIATE (Xray Chest 1 View-PORTABLE IMMEDIATE .) (09.26.21 @ 15:28) : Small bilateral pleural effusions and mild pulmonary edema. A right lower lobe pneumonia is not excluded.      9/26/21: Xray Abdomen 1 View Portable, IMMEDIATE (Xray Abdomen 1 View Portable, IMMEDIATE .) (09.26.21 @ 15:28) : There is a nasogastric tube with its tip in the stomach. There is gaseous distention of the colon. No pathologic calcifications are seen. The osseous structures are intact with degenerative change is present in the spine.      9/17/21 : MR Foot No Cont, Right (09.17.21 @ 13:04) : Resection at the mid aspect of the fifth metatarsal. Lateral soft tissue wound with marrow signal abnormality throughout the fifth metatarsal and within the adjacent fourth metatarsal head which is nonspecific in the setting of recent surgery although osteomyelitis is suspected.    9/16/21 : Xray Foot AP + Lateral + Oblique, Right (09.16.21 @ 15:03) : No acute finding. No plain film evidence of osteomyelitis.        MICROBIOLOGY DATA:    Urine Microscopic-Add On (NC) (09.22.21 @ 16:14)   Red Blood Cell - Urine: 0-2 /HPF   White Blood Cell - Urine: 3-5 /HPF   Bacteria: Moderate /HPF   Comment - Urine: yeast cells present   Epithelial Cells: Moderate /HPF     Culture - Tissue with Gram Stain (09.22.21 @ 13:54)   Gram Stain: No polymorphonuclear cells seen per low power field   No organisms seen per oil power field   Specimen Source: .Tissue Right 5th toe r/o osteomyeltis   Culture Results: No growth     COVID-19 David Domain Antibody (09.18.21 @ 12:55)   COVID-19 David Domain Antibody Result: >250.00:    Culture - Blood (09.17.21 @ 10:28)   Specimen Source: .Blood Blood-Peripheral   Culture Results: No growth to date.     Culture - Blood (09.17.21 @ 10:28)   Specimen Source: .Blood Blood-Venous   Culture Results: No growth to date.     Culture - Surgical Swab (09.16.21 @ 21:42)   - Amikacin: S <=16   - Amikacin: S <=16   - Amoxicillin/Clavulanic Acid: S <=8/4   - Ampicillin: R >16 These ampicillin results predict results for amoxicillin   - Ampicillin: S <=2 Predicts results to ampicillin/sulbactam, amoxacillin-clavulanate and piperacillin-tazobactam.   - Ampicillin/Sulbactam: S 8/4 Enterobacter, Citrobacter, and Serratia may develop resistance during prolonged therapy (3-4 days)   - Ampicillin/Sulbactam: R >16/8   - Aztreonam: S <=4   - Aztreonam: S <=4   - Cefazolin: R 16 Enterobacter, Citrobacter, and Serratia may develop resistance during prolonged therapy (3-4 days)   - Cefazolin: R >16   - Cefepime: S <=2   - Cefepime: S <=2   - Cefoxitin: S <=8   - Cefoxitin: S <=8   - Ceftazidime: S <=1   - Ceftriaxone: S <=1   - Ceftriaxone: S <=1 Enterobacter, Citrobacter, and Serratia may develop resistance during prolonged therapy   - Ciprofloxacin: S <=0.25   - Ciprofloxacin: S <=0.25   - Ertapenem: S <=0.5   - Gentamicin: S <=2   - Gentamicin: S <=2   - Imipenem: S <=1   - Levofloxacin: S <=0.5   - Levofloxacin: S <=0.5   - Meropenem: S <=1   - Meropenem: S <=1   - Piperacillin/Tazobactam: S <=8   - Piperacillin/Tazobactam: S <=8   - Tetra/Doxy: S 4   - Tobramycin: S <=2   - Trimethoprim/Sulfamethoxazole: S <=0.5/9.5   - Trimethoprim/Sulfamethoxazole: S <=0.5/9.5   - Vancomycin: S 2   Specimen Source: .Surgical Swab right foot wound   Culture Results:   Culture yields >4 types of aerobic and/or anaerobic bacteria   Call client services within 7 days if further workup is clinically   indicated. Culture includes   Rare Aeromonas hydrophila/caviae   Few Klebsiella oxytoca/Raoutella ornithinolytica   Few Enterococcus faecalis   Few Streptococcus agalactiae (Group B) isolated   Group B streptococci are susceptible to ampicillin,   penicillin and cefazolin, but may be resistant to   erythromycin and clindamycin.   Recommendations for intrapartum prophylaxis for Group B   streptococci are penicillin or ampicillin.   Organism Identification: Aeromonas hydrophila/caviae   Klebsiella oxytoca /Raoutella ornithinolytica   Enterococcus faecalis   Organism: Aeromonas hydrophila/caviae   Organism: Klebsiella oxytoca /Raoutella ornithinolytica   Organism: Enterococcus faecalis   COVID-19 PCR . (09.16.21 @ 22:24)   COVID-19 PCR: NotDetec:

## 2021-10-10 NOTE — PROGRESS NOTE ADULT - ASSESSMENT
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 s/p OR debridement today. Nephrology consulted for abrupt rise in SCr.     1. OREN- Recurrent ATN in the setting of infection. Lisinopril discontinued 9/22. ATN resolving with good urine o/p. Now off diuretics; likely recovery phase/ diuretic phase.    s/p CT chest with b/l mod to large pleural effusion R>L. s/p rt pigtail catheter.   Kidney/ Bladder US with large distended bladder s/p ibrahim placement on 9/23, then removed. s/p bladder scan 10/4 with 1L urine for which ibrahim was reinserted; however; renal function did not improve post ibrahim; less likely due to obstructive uropathy.  No peripheral eosinophilia- no signs of AIN. C3 & C4 wnl with neg ASO- no signs of PIGN. Strict I/Os. Avoid nephrotoxins/ NSAIDs/ RCA. Monitor BMP.    2. 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 Will defer secondary w/u as an outpt. Avoid nephrotoxins  3. HTN 2/2 CKD- BP low normal- Now off pressors. Plan as per ICU. Monitor BP  4. Acute osteomyelitis- s/p debridement 9/22. Pt now on Vanco and meropenem (renally dosed). Monitor Vanco trough. Plan as per ID      [facility] NEPHROLOGY  Bryn Johnson M.D.  Domingo Booth D.O.  Zakia Rodriguez M.D.  Zonia Adams, MSN, ANP-C  [phone]    [address] [place] [address]

## 2021-10-10 NOTE — PROGRESS NOTE ADULT - ASSESSMENT
Assessment and Recommendation:   · Assessment	  Assessment:  - Acute Hypoxic Respiratory Failure  - Sepsis 2/2 to Aspiration PNA vs R foot OM   - OREN superimposed on CKD  - Severe AS   - CAD s/p CABG   - Cholelithiasis   - HTN  - HLD    Plan:  Neuro:    - On propofol, 25mcg, will reduce to wake him up  - On Fentanyl drip   - Versed, precedex as needed  -Pupils are not equal and reactive, needs a CT head    CVS:  # Severe Aortic Stenosis   - Echo 8/15  confirmed EF 55-60%, Severe LVH, g1 diastolic dysfunction, and severe AS   -Murmurs on exam, systolic  -Cardiology on board SABIHA once stable    # combined systolic and diastolic heart failure  - CT Chest with pulm edema bilaterally; moderate to large pleural effusions; atelectasis vs PNA   - Echo 8/15  confirmed EF 55-60%, Severe LVH, g1 diastolic dysfunction, and severe AS   - new 10/8/2021  EF 40%  - BNP 19K with worsening overload as above; 9K today pt with poor cardiac reserve worsened by acute infection   - CXR  this admission showing improved R sided pleural effusion with diuresis  - Avoid drugs which increase afterload   -40IV lasix Q6 will switch to once day and transition to 20 daily once net even      #CAD   - S/p CABG, on ASA   - Continue Toprol 100 Qd, Procardia 60XL once satble  - C/w Lasix 40 IV mg Q6h, Albumin 100mg   - Positive trop 1.46->1.550; likely due to increased demand and renal failure.  -Now down trended 1.05  - No active chest pain   - Echo 10/8/2021 EF 40%  - Will need a SABIHA and R+L heart cath once medically optimized  - C/w ASA, statin  - Cardiology Dr. Wilkins   -new onset A-fib?, started on heparin drip    #hypotension  Likely in the setting of intrapulmonary pressures,  CXR looks worse, with pleural effusion and increased vascular marking  Midodrine 5mg stat given 10/8/2021 followed by stress dose, followed by pheny  Intubated   Overnight and this morning MAP 70 on Pheny of 0.3 despite propofol   c/w Albumin   c/w with mechanical ventilation until chest tune and lung function is improved  Hold all BP meds  Switch to levophed due to recent echo    #tachycardia 120s  The EKG and tele record A-fib with RVR, and left bundle   Afib likely new, old EKGs say sinus rhythm with nonspecific IVCD, LAPB  Hx of CABG  AC held for chest tube yesterday  Will resume today, Dr. Wilkins on board will be notified  -Heparin drip resumed           Pulm:  - Acute Hypoxic/ Hypercapnic Resp Failure 2/2  1. RLL PNA vs  2. Right pleural effusions vs  3. CHF    - Patient was on Pendant, saturating 90% on 10L pendant, but having increased WOB, tachypnea   - Possible left lower lobe pneumonia and bilateral pleural effusions on CT Chest   - ABG was showing 7.49/33/52/25-> respiratory alkalosis with hypoxic   -ICU  Bipap 10/5 for hypoxia and work of breathing,  -Got intubated 10/6/2021  -pt also had CABG hx and BNP 19k - 9K this am=dmission  - Per surgery, Tello catheter placed without issue and drained 1000cc immediately and clamped.  - Sent fluid for tests/ cultures LDH, cytology..  -c/w lasix 40IV daily  -c/w with abx ( Vanc and Meropenem )   - avoid fluid overloading   -Vent set 18/450/5/40 sats 100 may be SBT'd today    ID:  - Empiric Aspiration PNA coverage, R foot osteomyelitis, and Funguria   - Blood Cultures -ve till date, Bone and wound culture: showing Aeromonas, GBS, Klebsiella, Enterococcus   -Cultures from 9/22/2021, tissue and blood 9/17 negative  - UCX showing yeast - one source  - On Unasyn, Levaquin, Diflucan; renally dosed - all >12 days  -DC diflucan as it is only one source, also given today's LFTs  -DC Levaquin  -Continue on vanc and faustino day 2    Nephro:  - Ibrahim placed for possible obstruction   - OREN on CKD stage III: ATN was resolving, however renal fxn now worsened.   - Kidney/ Bladder US with large distended bladder s/p ibrahim placement on 9/23, then removed.   - S/p bladder scan 10/4 with 1L urine for which Ibrahim was reinserted  - FeUrea: 20.5% 9/23:   -Cr 2.02 down trended then 2.12, 3.02 BUN/ cr ratio 12 today most likely ATN from Hypotension  - Less likely due to obstructive uropathy.  No peripheral eosinophilia- no signs of AIN.   - Avoid nephrotoxins/ NSAIDs/ RCA. Monitor BMP.  -UOP now -ve   -Nephrology on board Dr. Rodriguez   -vanc [facility] today     GI:  -RLL likely aspiration  - Had an Aspiration event after NG tube placed and subsequently removed by pt  - Aspiration precautions, speech and swallow recs, based on cineesophagogram  - Cholelithiasis noted on RUQ US   - Patient is having 2 lose stools daily  -however patient is now intubated   - NPO with tube feed   -dc senna    #shock liver  after a day of hypertension  improving  maintain BP  Will pursue RUQ US today    Heme:  - no indication for transfusion currently  - admitted with Hb 12.7 in August; small downtrend in the setting of CKD   - No active bleeding, normocytic   - Fe panel shows deficiency; will hold repletion during acute infection  - Likely component of ACD, CKD  - Tranfuse if Hb<7 or if worsening tachy/HF sx     Endo:  - target CBG < 180 controlled NPO today  -On tube feeds  - C/w Sliding Scale, accuchecks ACHS     Dispo:  - monitor in the ICU   - PT FULL CODE - confirmed w/ Daughter Mrs. Arabella Oliva

## 2021-10-10 NOTE — PROGRESS NOTE ADULT - ASSESSMENT
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  As per daughter, patient was taking tylenol which did not help his pain prompting the patient to ask his daughter to take him to the hospital. ON admission, he has no fever or Leukocytosis. The Xray of Right foot shows no Osteomyelitis but MRI of Right foot shows Lateral soft tissue wound with marrow signal abnormality throughout the fifth metatarsal which is consistent of Osteomyelitis. He has seen by Podiatry and wound culture has sent, Zosyn and Vancomycin has started. The ID consult requested to assist with further evaluation and antibiotic management.     # Right Fifth metatarsal DFU with drainage and Osteomyelitis- wound cx grew Enterococcus, Aeromonas and Klebsiella - Zosyn is the ideal to cover All organisms but kidney function is worsening, hence change to Unasyn and Levaquin until kidney function is improved - The pathology shows Bone with acute osteomyelitis and necrotic periosteal tissue.   # OREN- s/p urinary retention -s/p ibrahim catheter - s/p discontinue ACEI  # RLL pneumonia- most likely Aspiration, S/p Vomiting - On Unasyn  # Large bowel distension  # Candiduria- 9/22/21  # Pulmonary edema with bilateral moderate to large pleural effusions- on CT chest, 10/5/21- s/p intubation 10/7- s/p Right sided chest tube placement by CT team, 10/8/21    would recommend:    1. Monitor  kidney function and adjust Abx doses accordingly   2. Please adjust  Meropenem and Vancomycin  doses based on Crcl  3. Management of Vent as per ICU protocol  4. Wound care as per Podiatry  5. Aspiration precaution    d/w ICU team     Attending Attestation:    Spent more than 45 minutes on total encounter, more than 50 % of the visit was spent counseling and/or coordinating care by the Attending physician.  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  As per daughter, patient was taking tylenol which did not help his pain prompting the patient to ask his daughter to take him to the hospital. ON admission, he has no fever or Leukocytosis. The Xray of Right foot shows no Osteomyelitis but MRI of Right foot shows Lateral soft tissue wound with marrow signal abnormality throughout the fifth metatarsal which is consistent of Osteomyelitis. He has seen by Podiatry and wound culture has sent, Zosyn and Vancomycin has started. The ID consult requested to assist with further evaluation and antibiotic management.     # Right Fifth metatarsal DFU with drainage and Osteomyelitis- wound cx grew Enterococcus, Aeromonas and Klebsiella - Zosyn is the ideal to cover All organisms but kidney function is worsening, hence change to Unasyn and Levaquin until kidney function is improved - The pathology shows Bone with acute osteomyelitis and necrotic periosteal tissue.   # OREN- s/p urinary retention -s/p ibrahim catheter - s/p discontinue ACEI  # RLL pneumonia- most likely Aspiration, S/p Vomiting - On Unasyn  # Large bowel distension  # Candiduria- 9/22/21  # Pulmonary edema with bilateral moderate to large pleural effusions- on CT chest, 10/5/21- s/p intubation 10/7- s/p Right sided chest tube placement by CT team, 10/8/21    would recommend:    1. Monitor  kidney function and adjust Abx doses accordingly   2. Please adjust Meropenem and Vancomycin doses based on Crcl  3. Management of Vent as per ICU protocol  4. Wound care as per Podiatry  5. Aspiration precaution    d/w ICU team     Attending Attestation:    Spent more than 45 minutes on total encounter, more than 50 % of the visit was spent counseling and/or coordinating care by the Attending physician.

## 2021-10-10 NOTE — PROGRESS NOTE ADULT - ATTENDING COMMENTS
IMP: This is a  76 yr old man from home, lives with daughter with DM- uncontrol  on insulin, HTN, HLD, CAD, PSH R partial 5th ray amputation 8/19/2021 p/w R foot pain x1 day associated with foul smelling discharge due to osteomyelitis beig treated with iv antibx .  Pulmonary evaluation requested for hypoxia  with pleural effusion         Assessment:  - Acute Hypoxic Respiratory Failure  - Pulmonary Edema  - Pleural Effusion   - Possible  Aspiration PNA   - Osteomyelitis of right foot   - OREN superimposed on CKD  - Severe AS   - CAD s/p CABG   - Cholelithiasis   - HTN  - HLD  - DM uncontrol   - Diabetic foot ulcer      Plan   -new onset afib over night with LBBB  -continue  therapeutic anticoag   -cards following   -intubated 10/7 after failing trial of BiPaP  -right pig chest tube 10/8.. drained 1000 ml   -Septic shock   -no beta- or ACE - due to shock   -add ASA , continue statin   -titrate off vasopressors  to maintain MAP>65  -midodrine   -will need BiPaP vs intubation   -keep chest tube to suction   -hemodynamic monitoring   -LFT trending up  (was  on levaquin and Unasyn)  -continue antibx changed to Meropenem and vanco iv   -monitor blood sugar with coverage   -advance directives  -DVT/ GI prphy . IMP: This is a  76 yr old man from home, lives with daughter with DM- uncontrol  on insulin, HTN, HLD, CAD, PSH R partial 5th ray amputation 8/19/2021 p/w R foot pain x1 day associated with foul smelling discharge due to osteomyelitis beig treated with iv antibx .  Pulmonary evaluation requested for hypoxia  with pleural effusion         Assessment:  - Acute Hypoxic Respiratory Failure  - Pulmonary Edema  - Pleural Effusion   - Possible  Aspiration PNA   - Osteomyelitis of right foot   - OREN superimposed on CKD  - Severe AS   - CAD s/p CABG   - Cholelithiasis   - HTN  - HLD  - DM uncontrol   - Diabetic foot ulcer      Plan   -new onset afib over night with LBBB  -continue  therapeutic anticoag   -cards following   -intubated 10/7 after failing trial of BiPaP  -right pig chest tube 10/8.. drained 1000 ml , off suction   -initial pleural fluid studies  .. exudate   -Septic shock   -no beta- or ACE - due to shock   -add ASA , continue statin   -titrate off vasopressors  to maintain MAP>65  -midodrine   -will need BiPaP vs intubation   -keep chest tube to suction   -hemodynamic monitoring   -LFT trending up  (was  on levaquin and Unasyn)  -continue antibx changed to Meropenem and vanco iv   -monitor blood sugar with coverage   -advance directives  -DVT/ GI prphy . IMP: This is a  76 yr old man from home, lives with daughter with DM- uncontrol  on insulin, HTN, HLD, CAD, PSH R partial 5th ray amputation 8/19/2021 p/w R foot pain x1 day associated with foul smelling discharge due to osteomyelitis beig treated with iv antibx .  Pulmonary evaluation requested for hypoxia  with pleural effusion         Assessment:  - Acute Hypoxic Respiratory Failure  - Pulmonary Edema  - Pleural Effusion   - Possible  Aspiration PNA   - Osteomyelitis of right foot   - OREN superimposed on CKD  - Severe AS   - CAD s/p CABG   - Cholelithiasis   - HTN  - HLD  - DM uncontrol   - Diabetic foot ulcer      Plan   -new onset afib over night with LBBB  -continue  therapeutic anticoag   -cards following   -intubated 10/7 after failing trial of BiPaP  -right pig chest tube 10/8.. drained 1000 ml , off suction   -initial pleural fluid studies  .. trandudate   -Septic shock   -no beta- or ACE - due to shock   -add ASA , continue statin   -titrate off vasopressors  to maintain MAP>65  -midodrine   -will need BiPaP vs intubation   -keep chest tube to suction   -hemodynamic monitoring   -LFT trending up  (was  on levaquin and Unasyn)  -continue antibx changed to Meropenem and vanco iv   -monitor blood sugar with coverage   -advance directives  -DVT/ GI prphy .

## 2021-10-10 NOTE — PROGRESS NOTE ADULT - SUBJECTIVE AND OBJECTIVE BOX
PGY-1 Progress Note discussed with attending    PAGER #: [[phone]] TILL 5:00 PM  PLEASE CONTACT ON CALL TEAM:  - On Call Team (Please refer to Dashawn) FROM 5:00 PM - 8:30PM  - Nightfloat Team FROM 8:30 -7:30 AM    BRIEF HOSPITAL COURSE  Patient is a 76y old  Male who presents with a chief complaint of R Foot Pain (05 Oct 2021 17:10)      HPI:  Patient is a 76 year old male  with PMH of poorly controlled IDDM Hgb A1C 11.4, HTN, HLD, stage III CKD, CAD s/p CABG and recent partial amputation to right 5th digit (8/19/21) who presented to ED with c/o right foot pain associated with discharge and foul odor. No post op follow up. Came to the ED due to sub obtimal pain control. No fever, chest, pain, palpitations, nausea, vomiting, diarrhea (17 Sep 2021 01:11)    INTERVAL HPI/ HOSPITAL COURSE   MRI confirms suspected osteomyelitis of the R foot - patient followed by podiatry and ID; and underwent debridement and wound VAC placement on R foot. Surgical pathology resulted OM, and wound culture resulting Enterococcus Aeromonal and Klebsiella- treated initially with Zosyn. He developed OREN likely mixed 2/2 to a mixed etiology with prerenal from active infection/ pulmonary edema, intrarenal due to toxicity from IV antibiotics and post renal from urinary retention, Nephrology consulted for optimization of kidney function. IV antibiotic regimen switched to Unasyn and Levaquin, sensitive for cultured organisms; requiring 6 weeks of IV antibiotics.     Patient then developed Pulmonary Edema and was treated with IV lasix. Cardiology consulted and recommending SABIHA with L/R heart cath once infection clears and medically stable. Hospital course further complicated by abdominal distention and constipation for which he received lactulose and had vomiting. AXR shows distended loops of bowel for which NG tube was placed which the patient removed overnight 9/26 - re-attempts to replace NG tube were unsuccessful as patient was non-cooperative.     The patient went on to develop worsening respiratory distress sats 80s% SPO2 on 10L NC : CXR showed possible RLL PNA. likely aspiration.CT abdomen deferred for now as patient is unstable and will not tolerate lying flat. CT chest done showing R>L pulmonary edema vs Pneumonia;. Pulm (DR. Xavier) consulted for possible thoracentesis, and recommended to start albumin + lasix. ICU was consulted for worsening respiratory status. Surgery consulted for pigtail placement, however, inadequate visualization of pocket.  Patient denies any current trouble breathing, chest pain, or cough.     INTERVAL HPI/OVERNIGHT EVENTS:   no acute events overnight , Vanc trough done overnight was 20.3 , woill repeat again in AM       REVIEW OF SYSTEMS:  Cannot be assessed given mental status.     MEDICATIONS  (STANDING):  aspirin  chewable 81 milliGRAM(s) Oral daily  atorvastatin 80 milliGRAM(s) Oral at bedtime  chlorhexidine 0.12% Liquid 15 milliLiter(s) Oral Mucosa every 12 hours  chlorhexidine 2% Cloths 1 Application(s) Topical <User Schedule>  cyanocobalamin 1000 MICROGram(s) Oral daily  ergocalciferol 46020 Unit(s) Oral <User Schedule>  fentaNYL   Infusion 0.5 MICROgram(s)/kG/Hr (4.35 mL/Hr) IV Continuous <Continuous>  ferrous    sulfate 325 milliGRAM(s) Oral daily  finasteride 5 milliGRAM(s) Oral daily  heparin  Infusion.  Unit(s)/Hr (16 mL/Hr) IV Continuous <Continuous>  insulin lispro (ADMELOG) corrective regimen sliding scale   SubCutaneous Before meals and at bedtime  meropenem  IVPB 500 milliGRAM(s) IV Intermittent every 12 hours  pantoprazole  Injectable 40 milliGRAM(s) IV Push at bedtime  phenylephrine    Infusion 0.5 MICROgram(s)/kG/Min (8.15 mL/Hr) IV Continuous <Continuous>  propofol Infusion 30 MICROgram(s)/kG/Min (15.6 mL/Hr) IV Continuous <Continuous>  tamsulosin 0.4 milliGRAM(s) Oral at bedtime  vancomycin  IVPB 1250 milliGRAM(s) IV Intermittent daily    MEDICATIONS  (PRN):  ondansetron Injectable 4 milliGRAM(s) IV Push three times a day PRN Nausea and/or Vomiting  sodium chloride 0.9% lock flush 10 milliLiter(s) IV Push every 1 hour PRN Pre/post blood products, medications, blood draw, and to maintain line patency      Vital Signs Last 24 Hrs  T(C): 36.9 (09 Oct 2021 23:01), Max: 37.1 (09 Oct 2021 19:01)  T(F): 98.4 (09 Oct 2021 23:01), Max: 98.7 (09 Oct 2021 19:01)  HR: 64 (10 Oct 2021 00:00) (58 - 137)  BP: 117/43 (09 Oct 2021 22:45) (78/58 - 134/56)  BP(mean): 61 (09 Oct 2021 22:45) (52 - 101)  RR: 16 (09 Oct 2021 22:45) (12 - 22)  SpO2: 100% (10 Oct 2021 00:00) (80% - 100%)    PHYSICAL EXAMINATION:  GENERAL: Intubated.  HEAD:  Atraumatic, Normocephalic  + ETT  EYES: EOMI, PRRLA, conjunctiva and sclera clear  ENT: Moist mucous membranes  NECK: Supple, No JVD  CHEST/LUNG: Diminished breath sound more in left side   right side chest tube, off suction   HEART: Regular rate and rhythm; GII holosystolic plataeu, at 5th ICS MCL, GI systolic crescendo decrescendo at RUSB, no rubs, or gallops  ABDOMEN: Bowel sounds present; Soft, Nontender, Nondistended. No hepatomegaly  EXTREMITIES:  2+ Peripheral Pulses, brisk capillary refill. No clubbing, cyanosis, or edema  NERVOUS SYSTEM: sedated  SKIN: No rashes or lesions                        8.4    8.00  )-----------( 175      ( 09 Oct 2021 18:09 )             26.3     10-09    147<H>  |  109<H>  |  34<H>  ----------------------------<  85  3.0<L>   |  28  |  2.45<H>    Ca    8.0<L>      09 Oct 2021 04:06  Phos  4.0     10-09  Mg     2.0     10-09    TPro  5.8<L>  /  Alb  2.2<L>  /  TBili  0.6  /  DBili  x   /  AST  525<H>  /  ALT  479<H>  /  AlkPhos  92  10-09    LIVER FUNCTIONS - ( 09 Oct 2021 04:06 )  Alb: 2.2 g/dL / Pro: 5.8 g/dL / ALK PHOS: 92 U/L / ALT: 479 U/L DA / AST: 525 U/L / GGT: x           CARDIAC MARKERS ( 09 Oct 2021 12:22 )  0.222 ng/mL / x     / x     / x     / x      CARDIAC MARKERS ( 09 Oct 2021 04:06 )  0.297 ng/mL / x     / 281 U/L / x     / <1.0 ng/mL      PTT - ( 09 Oct 2021 18:09 )  PTT:115.4 sec        CAPILLARY BLOOD GLUCOSE  CAPILLARY BLOOD GLUCOSE      POCT Blood Glucose.: 120 mg/dL (09 Oct 2021 22:29)    CAPILLARY BLOOD GLUCOSE      POCT Blood Glucose.: 120 mg/dL (09 Oct 2021 22:29)  POCT Blood Glucose.: 104 mg/dL (09 Oct 2021 15:46)  POCT Blood Glucose.: 97 mg/dL (09 Oct 2021 10:50)  POCT Blood Glucose.: 98 mg/dL (09 Oct 2021 07:13)      RADIOLOGY & ADDITIONAL TESTS:

## 2021-10-10 NOTE — PROGRESS NOTE ADULT - SUBJECTIVE AND OBJECTIVE BOX
`Patient is a 76y old  Male who presents with a chief complaint of R Foot Pain (10 Oct 2021 10:41)    PATIENT IS SEEN AND EXAMINED IN MEDICAL FLOOR.  KEENANT [    ]    MADDY [   ]      GT [   ]    ALLERGIES:  No Known Allergies      Daily     Daily Weight in k.3 (10 Oct 2021 07:00)    VITALS:    Vital Signs Last 24 Hrs  T(C): 36.5 (10 Oct 2021 10:00), Max: 37.1 (09 Oct 2021 19:01)  T(F): 97.7 (10 Oct 2021 10:00), Max: 98.7 (09 Oct 2021 19:01)  HR: 74 (10 Oct 2021 11:32) (61 - 84)  BP: 108/55 (10 Oct 2021 11:00) (96/46 - 136/58)  BP(mean): 69 (10 Oct 2021 11:00) (59 - 79)  RR: 14 (10 Oct 2021 11:00) (3 - 19)  SpO2: 100% (10 Oct 2021 11:32) (100% - 100%)    LABS:    CBC Full  -  ( 10 Oct 2021 06:50 )  WBC Count : 6.57 K/uL  RBC Count : 2.82 M/uL  Hemoglobin : 8.6 g/dL  Hematocrit : 26.5 %  Platelet Count - Automated : 170 K/uL  Mean Cell Volume : 94.0 fl  Mean Cell Hemoglobin : 30.5 pg  Mean Cell Hemoglobin Concentration : 32.5 gm/dL  Auto Neutrophil # : x  Auto Lymphocyte # : x  Auto Monocyte # : x  Auto Eosinophil # : x  Auto Basophil # : x  Auto Neutrophil % : x  Auto Lymphocyte % : x  Auto Monocyte % : x  Auto Eosinophil % : x  Auto Basophil % : x    PT/INR - ( 10 Oct 2021 03:54 )   PT: 15.8 sec;   INR: 1.35 ratio         PTT - ( 10 Oct 2021 06:51 )  PTT:82.9 sec  10-10    146<H>  |  110<H>  |  32<H>  ----------------------------<  110<H>  3.3<L>   |  30  |  1.95<H>    Ca    8.2<L>      10 Oct 2021 03:54  Phos  3.7     10-10  Mg     2.3     10-10    TPro  6.3  /  Alb  2.4<L>  /  TBili  0.5  /  DBili  x   /  AST  489<H>  /  ALT  559<H>  /  AlkPhos  146<H>  10-10    CAPILLARY BLOOD GLUCOSE      POCT Blood Glucose.: 153 mg/dL (10 Oct 2021 10:48)  POCT Blood Glucose.: 113 mg/dL (10 Oct 2021 05:42)  POCT Blood Glucose.: 120 mg/dL (09 Oct 2021 22:29)  POCT Blood Glucose.: 104 mg/dL (09 Oct 2021 15:46)    CARDIAC MARKERS ( 09 Oct 2021 12:22 )  0.222 ng/mL / x     / x     / x     / x      CARDIAC MARKERS ( 09 Oct 2021 04:06 )  0.297 ng/mL / x     / 281 U/L / x     / <1.0 ng/mL      LIVER FUNCTIONS - ( 10 Oct 2021 03:54 )  Alb: 2.4 g/dL / Pro: 6.3 g/dL / ALK PHOS: 146 U/L / ALT: 559 U/L DA / AST: 489 U/L / GGT: x           Creatinine Trend: 1.95<--, 2.45<--, 3.03<--, 2.12<--, 2.02<--, 2.11<--  I&O's Summary    09 Oct 2021 07:  -  10 Oct 2021 07:00  --------------------------------------------------------  IN: 1805.2 mL / OUT: 1545 mL / NET: 260.2 mL    10 Oct 2021 07:01  -  10 Oct 2021 11:44  --------------------------------------------------------  IN: 200.1 mL / OUT: 255 mL / NET: -54.9 mL        ABG - ( 10 Oct 2021 04:21 )  pH, Arterial: 7.43  pH, Blood: x     /  pCO2: 41    /  pO2: 203   / HCO3: 27    / Base Excess: 2.6   /  SaO2: 100                 .Body Fluid Pleural Fluid  10-08 @ 18:51 --  --  --      .Body Fluid Pleural Fluid  10-08 @ 18:34   No growth to date.  --    polymorphonuclear leukocytes seen  No organisms seen  by cytocentrifuge      .Tissue Right 5th toe r/o osteomyeltis   @ 13:54   No growth at 5 days  --    No polymorphonuclear cells seen per low power field  No organisms seen per oil power field      .Blood Blood-Venous   @ 10:28   No Growth Final  --  --      .Surgical Swab right foot wound   @ 21:42   Culture yields >4 types of aerobic and/or anaerobic bacteria  Call client services within 7 days if further workup is clinically  indicated. Culture includes  Rare Aeromonas hydrophila/caviae  Few Klebsiella oxytoca/Raoutella ornithinolytica  Few Enterococcus faecalis  Few Streptococcus agalactiae (Group B) isolated  Group B streptococci are susceptible to ampicillin,  penicillin and cefazolin, but may be resistant to  erythromycin and clindamycin.  Recommendations for intrapartum prophylaxis for Group B  streptococci are penicillin or ampicillin.  --  Aeromonas hydrophila/caviae  Klebsiella oxytoca /Raoutella ornithinolytica  Enterococcus faecalis      Bone R 5th metatarsal bone clean ma   @ 03:59   No growth at 5 days  --    No polymorphonuclear leukocytes seen per low power field  No organisms seen per oil power field      .Blood Blood-Venous   @ 21:09   No Growth Final  --  --      .Surgical Swab Right foot plantar wound   @ 21:08   Moderate Streptococcus agalactiae (Group B) isolated  Group B streptococci are susceptible to ampicillin,  penicillin and cefazolin, but may be resistant to  erythromycin and clindamycin.  Recommendations for intrapartum prophylaxis for Group B  streptococci are penicillin or ampicillin.  Moderate Bacteroides fragilis "Susceptibilities not performed"  Normal skin filiberto isolated  --  --          MEDICATIONS:    MEDICATIONS  (STANDING):  aspirin  chewable 81 milliGRAM(s) Oral daily  atorvastatin 80 milliGRAM(s) Oral at bedtime  chlorhexidine 0.12% Liquid 15 milliLiter(s) Oral Mucosa every 12 hours  chlorhexidine 2% Cloths 1 Application(s) Topical <User Schedule>  cyanocobalamin 1000 MICROGram(s) Oral daily  ergocalciferol 81940 Unit(s) Oral <User Schedule>  fentaNYL   Infusion 0.5 MICROgram(s)/kG/Hr (4.35 mL/Hr) IV Continuous <Continuous>  ferrous    sulfate 325 milliGRAM(s) Oral daily  finasteride 5 milliGRAM(s) Oral daily  heparin  Infusion.  Unit(s)/Hr (16 mL/Hr) IV Continuous <Continuous>  insulin lispro (ADMELOG) corrective regimen sliding scale   SubCutaneous Before meals and at bedtime  meropenem  IVPB 500 milliGRAM(s) IV Intermittent every 12 hours  pantoprazole  Injectable 40 milliGRAM(s) IV Push at bedtime  phenylephrine    Infusion 0.5 MICROgram(s)/kG/Min (8.15 mL/Hr) IV Continuous <Continuous>  propofol Infusion 30 MICROgram(s)/kG/Min (15.6 mL/Hr) IV Continuous <Continuous>  tamsulosin 0.4 milliGRAM(s) Oral at bedtime  vancomycin  IVPB 1250 milliGRAM(s) IV Intermittent daily      MEDICATIONS  (PRN):  ondansetron Injectable 4 milliGRAM(s) IV Push three times a day PRN Nausea and/or Vomiting  sodium chloride 0.9% lock flush 10 milliLiter(s) IV Push every 1 hour PRN Pre/post blood products, medications, blood draw, and to maintain line patency      REVIEW OF SYSTEMS:                           ALL ROS DONE [ X   ]    CONSTITUTIONAL:  LETHARGIC [   ], FEVER [   ], UNRESPONSIVE [   ]  CVS:  CP  [   ], SOB, [   ], PALPITATIONS [   ], DIZZYNESS [   ]  RS: COUGH [   ], SPUTUM [   ]  GI: ABDOMINAL PAIN [   ], NAUSEA [   ], VOMITINGS [   ], DIARRHEA [   ], CONSTIPATION [   ]  :  DYSURIA [   ], NOCTURIA [   ], INCREASED FREQUENCY [   ], DRIBLING [   ],  SKELETAL: PAINFUL JOINTS [   ], SWOLLEN JOINTS [   ], NECK ACHE [   ], LOW BACK ACHE [   ],  SKIN : ULCERS [   ], RASH [   ], ITCHING [   ]  CNS: HEAD ACHE [   ], DOUBLE VISION [   ], BLURRED VISION [   ], AMS / CONFUSION [   ], SEIZURES [   ], WEAKNESS [   ],TINGLING / NUMBNESS [   ]    PHYSICAL EXAMINATION:  GENERAL APPEARANCE: NO DISTRESS  HEENT:  NO PALLOR, NO  JVD,  NO   NODES, NECK SUPPLE  CVS: S1 +, S2 +,   RS: AEEB,  OCCASIONAL  RALES +,   NO RONCHI, CRACKLES +   ET+  ABD: SOFT, NT, NO, BS +       EXT: NO PE  SKIN: WARM,   RIGHT FOOT WRAPPED W/ WOUND VAC IN PLACE   ,   CVC+  ,   CT + [RIGH] ATTACHED TO PLEUROVAC  SKELETAL:  ROM ACCEPTABLE  CNS:  AAO X  0    RADIOLOGY :    EXAM:  CT ABDOMEN AND PELVIS OC                            PROCEDURE DATE:  2021          INTERPRETATION:  CLINICAL INFORMATION: Small bowel obstruction.    COMPARISON: None.    CONTRAST/COMPLICATIONS:  IV Contrast: NONE  Oral Contrast: Omnipaque 300  Complications: None reported at time of study completion    PROCEDURE:  CT of the Abdomen and Pelvis was performed.  Sagittal and coronal reformats were performed.    FINDINGS:  LOWER CHEST: Small bilateral pleural effusions with compressiveatelectasis of both lower lobes.    LIVER: Within normal limits.  BILE DUCTS: Normal caliber.  GALLBLADDER: Distended. Small layering gallstones.  SPLEEN: Within normal limits.  PANCREAS: Within normal limits.  ADRENALS: Within normal limits.  KIDNEYS/URETERS: No hydronephrosis. Nonobstructing left upper pole calculus, measuring 0.6 cm.    BLADDER: Urinary bladder contains air and a Castañeda catheter balloon.  REPRODUCTIVE ORGANS: Prostate is not enlarged.    BOWEL: No bowel obstruction. Appendix isnormal. Colonic diverticulosis.  PERITONEUM: Mild presacral fluid.  VESSELS: Atherosclerotic changes.  RETROPERITONEUM/LYMPH NODES: No lymphadenopathy.  ABDOMINAL WALL: Within normal limits.  BONES: Degenerative changes. Sternotomy.    IMPRESSION:  No acute intra-abdominal pathology.    Small bilateral pleural effusions with compressive atelectasis of both lower lobes.    ====================================================    EXAM:  MR FOOT RT                            PROCEDURE DATE:  2021          INTERPRETATION:  Clinical Information: Recent fifth metatarsal head resection now with fifth digit pain, swelling and foul discharge.    Comparison: Radiographs of the right foot from 2021 and MRI the right foot from 2021.    Technique:  MRI of the right midfoot and forefoot.  Intravenous Contrast: None.    Findings:    There is a resection at the mid aspect of the fifth metatarsal shaft. There is a lateral soft tissue wound beginning at the level of the amputation which extends distally. There is susceptibility artifact in the region of the amputation consistent with postoperative change. There is hyperintense T2 marrow signal throughout the remaining fifth metatarsal and within the adjacent fourth metatarsal head which is nonspecific and could be related to recent postoperative changes although osteomyelitis is suspected.    There is edema and mild atrophy within the plantar muscles of the foot. There is minimal spurring at the first metatarsophalangeal and hallux sesamoid articulations.    Impression:  Resection at the mid aspect of the fifth metatarsal. Lateral soft tissue wound with marrow signal abnormality throughout the fifth metatarsal and within the adjacent fourth metatarsal head which is nonspecific in the setting of recent surgery although osteomyelitis is suspected.        ASSESSMENT :     Headache    HTN (hypertension)    HLD (hyperlipidemia)    DM (diabetes mellitus)    CAD (coronary artery disease)    Glaucoma, angle-closure    [place] (hard of hearing)    No significant past surgical history    S/P CABG (coronary artery bypass graft)    History of thyroid surgery        PLAN:  HPI:  76M from home, lives with daughter w/ PMH DM on insulin, HTN, HLD, CAD, PSH R partial 5th ray amputation 2021 p/w R foot pain x1 day associated with foul smelling discharge. Pt with sharp pain on the R lateral foot. As per daughter, pt was taking tylenol which did not help his pain prompting the patient to ask his daughter to take him to the hospital. Pt is a poor historian. Daughter states that the patient did not see his podiatrist after the surgery. No fever, chest, pain, palpitations, nausea, vomiting, diarrhea (17 Sep 2021 01:11)    # TRANSFERRED TO ICU FOR WORSENING HYPOXIA AND DYSPNEA    # SUSPECT RIGHT FOOT OSTEOMYELITIS S/P RIGHT 5TH RAY RESECTION [] AND S/P DEBRIDEMENT, GRAFT APPLICATION, BONE BX AND WOUND VAC APPLICATION   ** RECENT ADMISSION FOR RIGHT DIABETIC FOOT ULCER, CELLULITIS, OSTEOMYELITIS RIGHT 5th METATARSAL S/P RIGHT 5TH PARTIAL RAY AMPUTATION [] - BONE PATHOLOGY W/ CLEAN MARGINS    - S/P VANCOMYCIN AND ZOSYN ; NOW ON UNASYN AND LEVAQUIN GIVEN OREN; PER ID SWITCH TO ZOSYN UNTIL 11/3  - RECENTLY HAD SIMON W/ MILD PAD  - REVIEWED WOUND CX [ ENTEROCOCCUS, AEROMONAS, KLEBSIELLA] AND BCX  - BONE BX - acute osteomyelitis and necrotic periosteal tissue.   - ID CONSULT, PODIATRY CONSULT    - PICC LINE PLACED TODAY TO COMPLETE 6 WEEKS OF ANTIBIOTICS - PER ID SWITCH TO ZOSYN UNTIL 11/3    # ACUTE HYPOXIC RESPIRATORY FAILURE SUSPECT S/T PULMONARY EDEMA IN THE SETTING OF SEVERE AORTIC STENOSIS + ASPIRATION PNA - S/P CT TUBE PLACEMENT [SET TO PLEUROVAC] - SWITCHED FROM LEVAQUIN TO VANCOMYCIN + MEROPENEM, LASIX, CARDIOLOGY CONSULT IN PROGRESS  - S/P CRITICAL CARE CONSULT  - S/P LASIX ON 10/1 - 10/2, 10/4 AND 10/5  - CT CHEST W/ BILATERAL PLEURAL EFFUSIONS [10/5] - PULMONOLOGY CONSULT FOR POSSIBLE THORACENTESIS & WILL CONTINUE LASIX   - ALSO F/U REPEAT ECHOCARDIOGRAM  - CT SURGERY WAS CONSULTED BUT UNABLE TO PLACE PIGTAIL, THUS IR CONSULT IN PROGRESS FOR PIGTAIL PLACEMENT  - CHEST TUBE PLACED [10/8]  - NOTED REPEAT ECHO    # SHOCK - ? S/T HYPOVOLEMIA VS. SEPSIS - W/ CVC [SWITCHED FROM FEMORAL TO LEFT IJ], ON VASOPRESSORS - SWITCHED TO VANCOMYCIN AND MEROPENEM  - F/U BCX, F/U UA  - ID CONSULT IN PROGRESS    # TRANSIENT NEW ONSET A.FIB, PAROXYSMAL - MONITORING ON CARDIAC MONITOR, MAY NEED A/C, CARDIOLOGY CONSULT IN PROGRESS    # FUNGURIA - D/C FLUCONAZOLE GIVEN TRANSAMINITIS    # TRANSAMINITIS - SUSPECT THIS IS ISCHEMIC HEPATITIS - HEPATOLOGY CONSULT IN PROGRESS    # BOWEL DISTENTION - ? ILEUS - OPTIMIZING ELECTROLYTES - IMPROVED  - SURGERY CONSULT IN PROGRESS  - PASSED 2 BMS ON     # ASPIRATION EVENT WHEN PATIENT REMOVED NGT - ON LEVAQUIN, ID CONSULT IN PROGRESS    # CHOLELITHIASIS - TRENDING LFTS, RUQ U/S - CHOLELITHIASIS, SURGERY CONSULT IN PROGRESS  - GI CONSULT IN PROGRESS - LESS SUSPICIOUS FOR CHOLECYSTITIS    # DYSPEPSIA - PLACED ON PPI BID WITH IMPROVEMENT, UNDERWENT ST EVAL     # ELEVATED TROPONINS - NSTEMI - ? DEMAND - WILL NEED ISCHEMIC EVAL ONCE ACUTE INFECTION RESOLVES PER CARDIOLOGY, CARDIOLOGY CONSULT IN PROGRESS  - ON ASA, STATIN, BB    # OREN ON CKD - S/T ATN AND URINARY RETENTION - S/P IVF, NOW W/ CASTAÑEDA, NEPHROLOGY CONUSLT IN PROGRESS  - ON FLOMAX, ADDED FINASTERIDE    - WITH WORSENING RENAL FXN - NOTED URINARY RETENTION [10/4] - REPLACED CASTAÑEDA    # MEDICAL CLEARANCE FOR SURGERY - RCRI - 2 - 10.1 % 30 DAY RISK OF DEATH, MI OR CARDIAC ARREST  - CARDIOLOGY CONSULT     # DM -  HBA1C - 11  - LANTUS, SSI + FS    # CAD S/P CABG - PLACED ON ASA, STATIN, BB  - ECHO - SEVERE AORTIC STENOSIS, SEVERE CONCENTRIC LVH, G1DD, MILD AK  - CARDIOLOGY CONSULT - DR. BASSETT   - MAY NEED ISCHEMIC EVAL ONCE ACUTE ILLNESS RESOLVES    # HTN - ON METOPROLOL, NICARDIPINE    # SEVERE, ASYMPTOMATIC, STENOSIS - ONCE ACUTE ILLNESS RESOLVES, WILL LIKELY NEED ISCHEMIC WORKUP, SABIHA TO EVALUATE FURTHER. D/W PATIENT, DAUGHTER AND CARDIOLOGY TEAM [PREVIOUSLY SAW DR. BASSETT]    # VITAMIN D DEFICIENCY - ON CHOLECALCIFEROL    # HLD - STATIN    # CASE DISCUSSED AT LENGTH WITH PATIENT AND DAUGHTER, BIRDIE YESICA AT BEDSIDE AND VIA PHONE @ [phone] [10/5] - CASE DICUSSED AT LENGTH AND ALL QUESTIONS WERE ANSWERED. DAUGHTER UNDERSTOOD THAT PROGNOSIS IS GUARDED.  # PATIENT AND DAUGHTER REFUSED [facility] ON PREVIOUS ADMISSION, PREVIOUSLY WISHED FOR PATIENT RETURN HOME W/ HOME PT; HOWEVER NOW, DAUGHTER WISHES FOR PATIENT TO GO TO [facility], AS PATIENT'S WIFE IS CURRENTLY ADMITTED THERE    # GI AND DVT PPX   Patient is a 76y old  Male who presents with a chief complaint of R Foot Pain (10 Oct 2021 10:41)    PATIENT IS SEEN AND EXAMINED IN MEDICAL FLOOR.    ALLERGIES:  No Known Allergies    Daily     Daily Weight in k.3 (10 Oct 2021 07:00)    VITALS:    Vital Signs Last 24 Hrs  T(C): 36.5 (10 Oct 2021 10:00), Max: 37.1 (09 Oct 2021 19:01)  T(F): 97.7 (10 Oct 2021 10:00), Max: 98.7 (09 Oct 2021 19:01)  HR: 74 (10 Oct 2021 11:32) (61 - 84)  BP: 108/55 (10 Oct 2021 11:00) (96/46 - 136/58)  BP(mean): 69 (10 Oct 2021 11:00) (59 - 79)  RR: 14 (10 Oct 2021 11:00) (3 - 19)  SpO2: 100% (10 Oct 2021 11:32) (100% - 100%)    LABS:    CBC Full  -  ( 10 Oct 2021 06:50 )  WBC Count : 6.57 K/uL  RBC Count : 2.82 M/uL  Hemoglobin : 8.6 g/dL  Hematocrit : 26.5 %  Platelet Count - Automated : 170 K/uL  Mean Cell Volume : 94.0 fl  Mean Cell Hemoglobin : 30.5 pg  Mean Cell Hemoglobin Concentration : 32.5 gm/dL  Auto Neutrophil # : x  Auto Lymphocyte # : x  Auto Monocyte # : x  Auto Eosinophil # : x  Auto Basophil # : x  Auto Neutrophil % : x  Auto Lymphocyte % : x  Auto Monocyte % : x  Auto Eosinophil % : x  Auto Basophil % : x    PT/INR - ( 10 Oct 2021 03:54 )   PT: 15.8 sec;   INR: 1.35 ratio         PTT - ( 10 Oct 2021 06:51 )  PTT:82.9 sec  10-10    146<H>  |  110<H>  |  32<H>  ----------------------------<  110<H>  3.3<L>   |  30  |  1.95<H>    Ca    8.2<L>      10 Oct 2021 03:54  Phos  3.7     10-10  Mg     2.3     10-10    TPro  6.3  /  Alb  2.4<L>  /  TBili  0.5  /  DBili  x   /  AST  489<H>  /  ALT  559<H>  /  AlkPhos  146<H>  10-10    CAPILLARY BLOOD GLUCOSE      POCT Blood Glucose.: 153 mg/dL (10 Oct 2021 10:48)  POCT Blood Glucose.: 113 mg/dL (10 Oct 2021 05:42)  POCT Blood Glucose.: 120 mg/dL (09 Oct 2021 22:29)  POCT Blood Glucose.: 104 mg/dL (09 Oct 2021 15:46)    CARDIAC MARKERS ( 09 Oct 2021 12:22 )  0.222 ng/mL / x     / x     / x     / x      CARDIAC MARKERS ( 09 Oct 2021 04:06 )  0.297 ng/mL / x     / 281 U/L / x     / <1.0 ng/mL      LIVER FUNCTIONS - ( 10 Oct 2021 03:54 )  Alb: 2.4 g/dL / Pro: 6.3 g/dL / ALK PHOS: 146 U/L / ALT: 559 U/L DA / AST: 489 U/L / GGT: x           Creatinine Trend: 1.95<--, 2.45<--, 3.03<--, 2.12<--, 2.02<--, 2.11<--  I&O's Summary    09 Oct 2021 07:  -  10 Oct 2021 07:00  --------------------------------------------------------  IN: 1805.2 mL / OUT: 1545 mL / NET: 260.2 mL    10 Oct 2021 07:01  -  10 Oct 2021 11:44  --------------------------------------------------------  IN: 200.1 mL / OUT: 255 mL / NET: -54.9 mL        ABG - ( 10 Oct 2021 04:21 )  pH, Arterial: 7.43  pH, Blood: x     /  pCO2: 41    /  pO2: 203   / HCO3: 27    / Base Excess: 2.6   /  SaO2: 100         .Body Fluid Pleural Fluid  10-08 @ 18:51 --  --  --      .Body Fluid Pleural Fluid  10-08 @ 18:34   No growth to date.  --    polymorphonuclear leukocytes seen  No organisms seen  by cytocentrifuge      .Tissue Right 5th toe r/o osteomyeltis   @ 13:54   No growth at 5 days  --    No polymorphonuclear cells seen per low power field  No organisms seen per oil power field      .Blood Blood-Venous   @ 10:28   No Growth Final  --  --      .Surgical Swab right foot wound   @ 21:42   Culture yields >4 types of aerobic and/or anaerobic bacteria  Call client services within 7 days if further workup is clinically  indicated. Culture includes  Rare Aeromonas hydrophila/caviae  Few Klebsiella oxytoca/Raoutella ornithinolytica  Few Enterococcus faecalis  Few Streptococcus agalactiae (Group B) isolated  Group B streptococci are susceptible to ampicillin,  penicillin and cefazolin, but may be resistant to  erythromycin and clindamycin.  Recommendations for intrapartum prophylaxis for Group B  streptococci are penicillin or ampicillin.  --  Aeromonas hydrophila/caviae  Klebsiella oxytoca /Raoutella ornithinolytica  Enterococcus faecalis      Bone R 5th metatarsal bone clean ma   @ 03:59   No growth at 5 days  --    No polymorphonuclear leukocytes seen per low power field  No organisms seen per oil power field      .Blood Blood-Venous   @ 21:09   No Growth Final  --  --      .Surgical Swab Right foot plantar wound   @ 21:08   Moderate Streptococcus agalactiae (Group B) isolated  Group B streptococci are susceptible to ampicillin,  penicillin and cefazolin, but may be resistant to  erythromycin and clindamycin.  Recommendations for intrapartum prophylaxis for Group B  streptococci are penicillin or ampicillin.  Moderate Bacteroides fragilis "Susceptibilities not performed"  Normal skin filiberto isolated  --  --          MEDICATIONS:    MEDICATIONS  (STANDING):  aspirin  chewable 81 milliGRAM(s) Oral daily  atorvastatin 80 milliGRAM(s) Oral at bedtime  chlorhexidine 0.12% Liquid 15 milliLiter(s) Oral Mucosa every 12 hours  chlorhexidine 2% Cloths 1 Application(s) Topical <User Schedule>  cyanocobalamin 1000 MICROGram(s) Oral daily  ergocalciferol 10170 Unit(s) Oral <User Schedule>  fentaNYL   Infusion 0.5 MICROgram(s)/kG/Hr (4.35 mL/Hr) IV Continuous <Continuous>  ferrous    sulfate 325 milliGRAM(s) Oral daily  finasteride 5 milliGRAM(s) Oral daily  heparin  Infusion.  Unit(s)/Hr (16 mL/Hr) IV Continuous <Continuous>  insulin lispro (ADMELOG) corrective regimen sliding scale   SubCutaneous Before meals and at bedtime  meropenem  IVPB 500 milliGRAM(s) IV Intermittent every 12 hours  pantoprazole  Injectable 40 milliGRAM(s) IV Push at bedtime  phenylephrine    Infusion 0.5 MICROgram(s)/kG/Min (8.15 mL/Hr) IV Continuous <Continuous>  propofol Infusion 30 MICROgram(s)/kG/Min (15.6 mL/Hr) IV Continuous <Continuous>  tamsulosin 0.4 milliGRAM(s) Oral at bedtime  vancomycin  IVPB 1250 milliGRAM(s) IV Intermittent daily      MEDICATIONS  (PRN):  ondansetron Injectable 4 milliGRAM(s) IV Push three times a day PRN Nausea and/or Vomiting  sodium chloride 0.9% lock flush 10 milliLiter(s) IV Push every 1 hour PRN Pre/post blood products, medications, blood draw, and to maintain line patency      REVIEW OF SYSTEMS:                           ALL ROS DONE [ X   ]    CONSTITUTIONAL:  LETHARGIC [   ], FEVER [   ], UNRESPONSIVE [   ]  CVS:  CP  [   ], SOB, [   ], PALPITATIONS [   ], DIZZYNESS [   ]  RS: COUGH [   ], SPUTUM [   ]  GI: ABDOMINAL PAIN [   ], NAUSEA [   ], VOMITINGS [   ], DIARRHEA [   ], CONSTIPATION [   ]  :  DYSURIA [   ], NOCTURIA [   ], INCREASED FREQUENCY [   ], DRIBLING [   ],  SKELETAL: PAINFUL JOINTS [   ], SWOLLEN JOINTS [   ], NECK ACHE [   ], LOW BACK ACHE [   ],  SKIN : ULCERS [   ], RASH [   ], ITCHING [   ]  CNS: HEAD ACHE [   ], DOUBLE VISION [   ], BLURRED VISION [   ], AMS / CONFUSION [   ], SEIZURES [   ], WEAKNESS [   ],TINGLING / NUMBNESS [   ]    PHYSICAL EXAMINATION:  GENERAL APPEARANCE: NO DISTRESS  HEENT:  NO PALLOR, NO  JVD,  NO   NODES, NECK SUPPLE  CVS: S1 +, S2 +,   RS: AEEB,  OCCASIONAL  RALES +,   NO RONCHI, CRACKLES +   ET+  ABD: SOFT, NT, NO, BS +       EXT: NO PE  SKIN: WARM,   RIGHT FOOT WRAPPED W/ WOUND VAC IN PLACE   ,   CVC+  ,   CT + [RIGH] ATTACHED TO PLEUROVAC  SKELETAL:  ROM ACCEPTABLE  CNS:  AAO X  0    RADIOLOGY :    EXAM:  CT ABDOMEN AND PELVIS OC                            PROCEDURE DATE:  2021          INTERPRETATION:  CLINICAL INFORMATION: Small bowel obstruction.    COMPARISON: None.    CONTRAST/COMPLICATIONS:  IV Contrast: NONE  Oral Contrast: Omnipaque 300  Complications: None reported at time of study completion    PROCEDURE:  CT of the Abdomen and Pelvis was performed.  Sagittal and coronal reformats were performed.    FINDINGS:  LOWER CHEST: Small bilateral pleural effusions with compressiveatelectasis of both lower lobes.    LIVER: Within normal limits.  BILE DUCTS: Normal caliber.  GALLBLADDER: Distended. Small layering gallstones.  SPLEEN: Within normal limits.  PANCREAS: Within normal limits.  ADRENALS: Within normal limits.  KIDNEYS/URETERS: No hydronephrosis. Nonobstructing left upper pole calculus, measuring 0.6 cm.    BLADDER: Urinary bladder contains air and a Castañeda catheter balloon.  REPRODUCTIVE ORGANS: Prostate is not enlarged.    BOWEL: No bowel obstruction. Appendix isnormal. Colonic diverticulosis.  PERITONEUM: Mild presacral fluid.  VESSELS: Atherosclerotic changes.  RETROPERITONEUM/LYMPH NODES: No lymphadenopathy.  ABDOMINAL WALL: Within normal limits.  BONES: Degenerative changes. Sternotomy.    IMPRESSION:  No acute intra-abdominal pathology.    Small bilateral pleural effusions with compressive atelectasis of both lower lobes.    ====================================================    EXAM:  MR FOOT RT                            PROCEDURE DATE:  2021          INTERPRETATION:  Clinical Information: Recent fifth metatarsal head resection now with fifth digit pain, swelling and foul discharge.    Comparison: Radiographs of the right foot from 2021 and MRI the right foot from 2021.    Technique:  MRI of the right midfoot and forefoot.  Intravenous Contrast: None.    Findings:    There is a resection at the mid aspect of the fifth metatarsal shaft. There is a lateral soft tissue wound beginning at the level of the amputation which extends distally. There is susceptibility artifact in the region of the amputation consistent with postoperative change. There is hyperintense T2 marrow signal throughout the remaining fifth metatarsal and within the adjacent fourth metatarsal head which is nonspecific and could be related to recent postoperative changes although osteomyelitis is suspected.    There is edema and mild atrophy within the plantar muscles of the foot. There is minimal spurring at the first metatarsophalangeal and hallux sesamoid articulations.    Impression:  Resection at the mid aspect of the fifth metatarsal. Lateral soft tissue wound with marrow signal abnormality throughout the fifth metatarsal and within the adjacent fourth metatarsal head which is nonspecific in the setting of recent surgery although osteomyelitis is suspected.        ASSESSMENT :     Headache    HTN (hypertension)    HLD (hyperlipidemia)    DM (diabetes mellitus)    CAD (coronary artery disease)    Glaucoma, angle-closure    [place] (hard of hearing)    No significant past surgical history    S/P CABG (coronary artery bypass graft)    History of thyroid surgery        PLAN:  HPI:  76M from home, lives with daughter w/ PMH DM on insulin, HTN, HLD, CAD, PSH R partial 5th ray amputation 2021 p/w R foot pain x1 day associated with foul smelling discharge. Pt with sharp pain on the R lateral foot. As per daughter, pt was taking tylenol which did not help his pain prompting the patient to ask his daughter to take him to the hospital. Pt is a poor historian. Daughter states that the patient did not see his podiatrist after the surgery. No fever, chest, pain, palpitations, nausea, vomiting, diarrhea (17 Sep 2021 01:11)    # TRANSFERRED TO ICU FOR WORSENING HYPOXIA AND DYSPNEA    # SUSPECT RIGHT FOOT OSTEOMYELITIS S/P RIGHT 5TH RAY RESECTION [] AND S/P DEBRIDEMENT, GRAFT APPLICATION, BONE BX AND WOUND VAC APPLICATION   ** RECENT ADMISSION FOR RIGHT DIABETIC FOOT ULCER, CELLULITIS, OSTEOMYELITIS RIGHT 5th METATARSAL S/P RIGHT 5TH PARTIAL RAY AMPUTATION [] - BONE PATHOLOGY W/ CLEAN MARGINS    - S/P VANCOMYCIN AND ZOSYN ; NOW ON UNASYN AND LEVAQUIN GIVEN OREN; PER ID SWITCH TO ZOSYN UNTIL 11/3  - RECENTLY HAD SIMON W/ MILD PAD  - REVIEWED WOUND CX [ ENTEROCOCCUS, AEROMONAS, KLEBSIELLA] AND BCX  - BONE BX - acute osteomyelitis and necrotic periosteal tissue.   - ID CONSULT, PODIATRY CONSULT    - PICC LINE PLACED TODAY TO COMPLETE 6 WEEKS OF ANTIBIOTICS - PER ID SWITCH TO ZOSYN UNTIL 11/3    # ACUTE HYPOXIC RESPIRATORY FAILURE SUSPECT S/T PULMONARY EDEMA IN THE SETTING OF SEVERE AORTIC STENOSIS + ASPIRATION PNA; COMBINED SYSTOLIC + DIASTOLIC HF - S/P CT TUBE PLACEMENT [SET TO PLEUROVAC] - SWITCHED FROM LEVAQUIN TO VANCOMYCIN + MEROPENEM, LASIX, CARDIOLOGY CONSULT IN PROGRESS  - CRITICAL CARE CONSULT  - ASPIRATION EVENT WHEN PATIENT REMOVED NGT   - S/P LASIX ON 10/1 - 10/2, 10/4 AND 10/5  - CT CHEST W/ BILATERAL PLEURAL EFFUSIONS [10/5] - PULMONOLOGY CONSULT FOR POSSIBLE THORACENTESIS & WILL CONTINUE LASIX   - ALSO F/U REPEAT ECHOCARDIOGRAM  - CT SURGERY WAS CONSULTED BUT UNABLE TO PLACE PIGTAIL, THUS IR CONSULT IN PROGRESS FOR PIGTAIL PLACEMENT  - CHEST TUBE PLACED [10/8]  - NOTED REPEAT ECHO    # SHOCK - ? S/T HYPOVOLEMIA VS. SEPSIS - W/ CVC [SWITCHED FROM FEMORAL TO LEFT IJ], S/P VASOPRESSORS - SWITCHED TO VANCOMYCIN AND MEROPENEM  - F/U BCX, F/U UA  - ID CONSULT IN PROGRESS    # TRANSIENT NEW ONSET A.FIB, PAROXYSMAL - MONITORING ON CARDIAC MONITOR, HEPARIN GTT, CARDIOLOGY CONSULT IN PROGRESS    # FUNGURIA - D/C FLUCONAZOLE GIVEN TRANSAMINITIS    # TRANSAMINITIS - SUSPECT THIS IS ISCHEMIC HEPATITIS - HEPATOLOGY CONSULT IN PROGRESS    # BOWEL DISTENTION - ? ILEUS - OPTIMIZING ELECTROLYTES - IMPROVED  - SURGERY CONSULT IN PROGRESS  - PASSED 2 BMS ON     # CHOLELITHIASIS - TRENDING LFTS, RUQ U/S - CHOLELITHIASIS, SURGERY CONSULT IN PROGRESS  - GI CONSULT IN PROGRESS - LESS SUSPICIOUS FOR CHOLECYSTITIS    # DYSPEPSIA - PLACED ON PPI BID WITH IMPROVEMENT, UNDERWENT ST EVAL     # ELEVATED TROPONINS - NSTEMI - ? DEMAND - WILL NEED ISCHEMIC EVAL ONCE ACUTE INFECTION RESOLVES PER CARDIOLOGY, CARDIOLOGY CONSULT IN PROGRESS  - ON ASA, STATIN, BB    # OREN ON CKD - S/T ATN AND URINARY RETENTION - S/P IVF, NOW W/ CASTAÑEDA, NEPHROLOGY CONUSLT IN PROGRESS  - ON FLOMAX, ADDED FINASTERIDE    - WITH WORSENING RENAL FXN - NOTED URINARY RETENTION [10/4] - REPLACED CASTAÑEDA    # MEDICAL CLEARANCE FOR SURGERY - RCRI - 2 - 10.1 % 30 DAY RISK OF DEATH, MI OR CARDIAC ARREST  - CARDIOLOGY CONSULT     # DM -  HBA1C - 11  - LANTUS, SSI + FS    # CAD S/P CABG - PLACED ON ASA, STATIN, BB  - ECHO - SEVERE AORTIC STENOSIS, SEVERE CONCENTRIC LVH, G1DD, MILD DE  - CARDIOLOGY CONSULT - DR. BASSETT   - MAY NEED ISCHEMIC EVAL ONCE ACUTE ILLNESS RESOLVES    # HTN - ON METOPROLOL, NICARDIPINE    # SEVERE, ASYMPTOMATIC, STENOSIS - ONCE ACUTE ILLNESS RESOLVES, WILL LIKELY NEED ISCHEMIC WORKUP, SABIHA TO EVALUATE FURTHER. D/W PATIENT, DAUGHTER AND CARDIOLOGY TEAM [PREVIOUSLY SAW DR. BASSETT]    # VITAMIN D DEFICIENCY - ON CHOLECALCIFEROL    # HLD - STATIN    # CASE DISCUSSED AT LENGTH WITH PATIENT AND DAUGHTER, BIRDIE ROBERT AT BEDSIDE AND VIA PHONE @ [phone] [10/5] - CASE DICUSSED AT LENGTH AND ALL QUESTIONS WERE ANSWERED. DAUGHTER UNDERSTOOD THAT PROGNOSIS IS GUARDED.  # PATIENT AND DAUGHTER REFUSED [facility] ON PREVIOUS ADMISSION, PREVIOUSLY WISHED FOR PATIENT RETURN HOME W/ HOME PT; HOWEVER NOW, DAUGHTER WISHES FOR PATIENT TO GO TO [facility], AS PATIENT'S WIFE IS CURRENTLY ADMITTED THERE    # GI AND DVT PPX

## 2021-10-10 NOTE — PROGRESS NOTE ADULT - SUBJECTIVE AND OBJECTIVE BOX
[facility] NEPHROLOGY- PROGRESS NOTE    Patient is a 75yo Male with DM on insulin, HTN, HLD, CAD, s/p R partial 5th ray amputation 8/19/2021 p/w R foot pain and foul drainage a/w diabetic foot ulcer suspicious for osteomyelitis. s/p OR debridement today. Nephrology consulted for abrupt rise in SCr.   s/p ibrahim placement for distended bladder on 9/23 with ~400ml of urine o/p  9/27- pt with SOB; CXR with pulmonary edema- pt given Lasix 80mg IV x1. Concern for aspiration PNA  Course BP: s/p lasix 60mg IV on 10/1 & 10/2 and s/p Lasix 40mg IV x1 today 10/4. Bladder scan with 1L urine; s/p ibrahim reinserted  Transferred to ICU for acute hypoxic respiratory failure, s/p intubated on 10/7    Hospital Medications: Medications reviewed.  REVIEW OF SYSTEMS: Unable to obtain; sedated and intubated    VITALS:  T(F): 97.4 (10-10-21 @ 16:00), Max: 98.7 (10-09-21 @ 19:01)  HR: 68 (10-10-21 @ 17:00)  BP: 102/49 (10-10-21 @ 17:00)  RR: 14 (10-10-21 @ 17:00)  SpO2: 100% (10-10-21 @ 17:00)  Wt(kg): --    10-09 @ 07:01  -  10-10 @ 07:00  --------------------------------------------------------  IN: 1805.2 mL / OUT: 1545 mL / NET: 260.2 mL    10-10 @ 07:01  -  10-10 @ 17:20  --------------------------------------------------------  IN: 400.2 mL / OUT: 370 mL / NET: 30.2 mL        PHYSICAL EXAM:  Gen: Sedated  HEENT: +ETT  Cards: RRR, +S1/S2, +TRINIDAD  Resp: +mechanical BS, +rt pigtail catheter  GI: soft,   : +ibrahim  Extremities: no LE edema B/L  Derm: Rt foot wrapped with wound vac    LABS:  10-10    146<H>  |  110<H>  |  32<H>  ----------------------------<  110<H>  3.3<L>   |  30  |  1.95<H>    Ca    8.2<L>      10 Oct 2021 03:54  Phos  3.7     10-10  Mg     2.3     10-10    TPro  6.3  /  Alb  2.4<L>  /  TBili  0.5  /  DBili      /  AST  489<H>  /  ALT  559<H>  /  AlkPhos  146<H>  10-10    Creatinine Trend: 1.95 <--, 2.45 <--, 3.03 <--, 2.12 <--, 2.02 <--, 2.11 <--, 2.17 <--, 2.13 <--                        8.6    6.57  )-----------( 170      ( 10 Oct 2021 06:50 )             26.5     Urine Studies:    Creatinine, Random Urine: 123 mg/dL (10-05 @ 12:43)  Chloride, Random Urine: <10 mmol/L (10-05 @ 12:43)  Sodium, Random Urine: 44 mmol/L (10-05 @ 12:43)        < from: Xray Chest 1 View- PORTABLE-Routine (Xray Chest 1 View- PORTABLE-Routine in AM.) (10.10.21 @ 09:47) >    EXAM:  XR CHEST PORTABLE URGENT 1V                          EXAM:  XR CHEST PORTABLE ROUTINE 1V                          EXAM:  XR CHEST PORTABLE ROUTINE 1V                            PROCEDURE DATE:  10/09/2021      < from: Xray Chest 1 View- PORTABLE-Routine (Xray Chest 1 View- PORTABLE-Routine in AM.) (10.10.21 @ 09:47) >    IMPRESSION:  Left upper extremity PICC line tip obscured by spine and enteric tube and most recent image.    Left IJ line, right pleural pigtail catheter, enteric, and ETT tubes as above.    Resolution of asymmetric pulmonary vascular redistribution/congestion on the most recent image from October 10, 2021.    Improved aeration at right base. Small residual hazy opacity may be due to subsegmental atelectasis or a small layering right pleural effusion with associated passive atelectasis.    Improved left lower chest and retrocardiac opacity with some residual seen, also, possibly subsegmental atelectasis or small left pleural effusion with associated passive atelectasis.    --- End of Report ---      < end of copied text >

## 2021-10-10 NOTE — PROGRESS NOTE ADULT - ASSESSMENT
77 y/o male b/l Pleural effusion, right greater than left, s/p R pigtail placement 10/8.     -Continue pigtail to suction, monitor output   -Daily CXR   -Continue care as per ICU team   -Discussed with Dr. Caceres who agrees  75 y/o male b/l Pleural effusion, right greater than left, s/p R pigtail placement 10/8.     -Continue pigtail to WATERSEAL, monitor output   -Daily CXR   -Continue care as per ICU team   -Discussed with Dr. Caceres who agrees  77 y/o male b/l Pleural effusion, right greater than left, s/p R pigtail placement 10/8.     -Continue pigtail to waterseal, monitor output   -Daily CXR   -Continue care as per ICU team, called ICU team to discuss recommendations of pigtail to waterseal   -Discussed with Dr. Caceres who agrees

## 2021-10-10 NOTE — PROGRESS NOTE ADULT - SUBJECTIVE AND OBJECTIVE BOX
INTERVAL HPI/OVERNIGHT EVENTS: No acute events overnight. Patient seen at bedside this morning, sedated and intubated     PRESSORS: [X] YES [ ] NO  WHICH: Phenylephrine     ANTIBIOTICS:                  DATE STARTED:    Antimicrobial:  meropenem  IVPB 500 milliGRAM(s) IV Intermittent every 12 hours  vancomycin  IVPB 1250 milliGRAM(s) IV Intermittent daily    Cardiovascular:  phenylephrine    Infusion 0.5 MICROgram(s)/kG/Min IV Continuous <Continuous>  tamsulosin 0.4 milliGRAM(s) Oral at bedtime    Pulmonary:    Hematalogic:  aspirin  chewable 81 milliGRAM(s) Oral daily  heparin  Infusion.  Unit(s)/Hr IV Continuous <Continuous>    Other:  atorvastatin 80 milliGRAM(s) Oral at bedtime  chlorhexidine 0.12% Liquid 15 milliLiter(s) Oral Mucosa every 12 hours  chlorhexidine 2% Cloths 1 Application(s) Topical <User Schedule>  cyanocobalamin 1000 MICROGram(s) Oral daily  ergocalciferol 27059 Unit(s) Oral <User Schedule>  fentaNYL   Infusion 0.5 MICROgram(s)/kG/Hr IV Continuous <Continuous>  ferrous    sulfate 325 milliGRAM(s) Oral daily  finasteride 5 milliGRAM(s) Oral daily  insulin lispro (ADMELOG) corrective regimen sliding scale   SubCutaneous Before meals and at bedtime  ondansetron Injectable 4 milliGRAM(s) IV Push three times a day PRN  pantoprazole  Injectable 40 milliGRAM(s) IV Push at bedtime  propofol Infusion 30 MICROgram(s)/kG/Min IV Continuous <Continuous>  sodium chloride 0.9% lock flush 10 milliLiter(s) IV Push every 1 hour PRN    aspirin  chewable 81 milliGRAM(s) Oral daily  atorvastatin 80 milliGRAM(s) Oral at bedtime  chlorhexidine 0.12% Liquid 15 milliLiter(s) Oral Mucosa every 12 hours  chlorhexidine 2% Cloths 1 Application(s) Topical <User Schedule>  cyanocobalamin 1000 MICROGram(s) Oral daily  ergocalciferol 12515 Unit(s) Oral <User Schedule>  fentaNYL   Infusion 0.5 MICROgram(s)/kG/Hr IV Continuous <Continuous>  ferrous    sulfate 325 milliGRAM(s) Oral daily  finasteride 5 milliGRAM(s) Oral daily  heparin  Infusion.  Unit(s)/Hr IV Continuous <Continuous>  insulin lispro (ADMELOG) corrective regimen sliding scale   SubCutaneous Before meals and at bedtime  meropenem  IVPB 500 milliGRAM(s) IV Intermittent every 12 hours  ondansetron Injectable 4 milliGRAM(s) IV Push three times a day PRN  pantoprazole  Injectable 40 milliGRAM(s) IV Push at bedtime  phenylephrine    Infusion 0.5 MICROgram(s)/kG/Min IV Continuous <Continuous>  propofol Infusion 30 MICROgram(s)/kG/Min IV Continuous <Continuous>  sodium chloride 0.9% lock flush 10 milliLiter(s) IV Push every 1 hour PRN  tamsulosin 0.4 milliGRAM(s) Oral at bedtime  vancomycin  IVPB 1250 milliGRAM(s) IV Intermittent daily    Drug Dosing Weight  Height (cm): 170.2 (17 Sep 2021 21:58)  Weight (kg): 86.9 (05 Oct 2021 21:45)  BMI (kg/m2): 30 (05 Oct 2021 21:45)  BSA (m2): 1.99 (05 Oct 2021 21:45)    CENTRAL LINE: [ X] YES [ ] NO  LOCATION:         CASTAÑEDA: [ X] YES [ ] NO          A-LINE:  [ ] YES [X] NO  LOCATION:         ICU Vital Signs Last 24 Hrs  T(C): 36.5 (10 Oct 2021 10:00), Max: 37.1 (09 Oct 2021 19:01)  T(F): 97.7 (10 Oct 2021 10:00), Max: 98.7 (09 Oct 2021 19:01)  HR: 72 (10 Oct 2021 12:00) (61 - 84)  BP: 109/55 (10 Oct 2021 12:00) (96/46 - 136/58)  BP(mean): 68 (10 Oct 2021 12:00) (59 - 79)  ABP: --  ABP(mean): --  RR: 11 (10 Oct 2021 12:00) (3 - 19)  SpO2: 100% (10 Oct 2021 12:00) (100% - 100%)      ABG - ( 10 Oct 2021 04:21 )  pH, Arterial: 7.43  pH, Blood: x     /  pCO2: 41    /  pO2: 203   / HCO3: 27    / Base Excess: 2.6   /  SaO2: 100                   10-09 @ 07:01  -  10-10 @ 07:00  --------------------------------------------------------  IN: 1805.2 mL / OUT: 1545 mL / NET: 260.2 mL        Mode: AC/ CMV (Assist Control/ Continuous Mandatory Ventilation)  RR (machine): 14  TV (machine): 450  FiO2: 40  PEEP: 5  ITime: 1  MAP: 8  PIP: 22        PHYSICAL EXAM:    GENERAL: sedated and intubated.   HEAD: Nomocephalic, Atraumatic  EYES: unable to assess   NECK: Supple, No JVD; Normal thyroid; Trachea midline  NERVOUS SYSTEM:  sedated   CHEST/LUNG: No rales, rhonchi, wheezing   HEART: Regular rate and rhythm; No murmurs,   ABDOMEN: Soft, Nontender, Nondistended; Bowel sounds present  EXTREMITIES:  2+ Peripheral Pulses, No clubbing, cyanosis, or edema  SKIN: No lesions      LABS:  CBC Full  -  ( 10 Oct 2021 06:50 )  WBC Count : 6.57 K/uL  RBC Count : 2.82 M/uL  Hemoglobin : 8.6 g/dL  Hematocrit : 26.5 %  Platelet Count - Automated : 170 K/uL  Mean Cell Volume : 94.0 fl  Mean Cell Hemoglobin : 30.5 pg  Mean Cell Hemoglobin Concentration : 32.5 gm/dL  Auto Neutrophil # : x  Auto Lymphocyte # : x  Auto Monocyte # : x  Auto Eosinophil # : x  Auto Basophil # : x  Auto Neutrophil % : x  Auto Lymphocyte % : x  Auto Monocyte % : x  Auto Eosinophil % : x  Auto Basophil % : x    10-10    146<H>  |  110<H>  |  32<H>  ----------------------------<  110<H>  3.3<L>   |  30  |  1.95<H>    Ca    8.2<L>      10 Oct 2021 03:54  Phos  3.7     10-10  Mg     2.3     10-10    TPro  6.3  /  Alb  2.4<L>  /  TBili  0.5  /  DBili  x   /  AST  489<H>  /  ALT  559<H>  /  AlkPhos  146<H>  10-10    PT/INR - ( 10 Oct 2021 03:54 )   PT: 15.8 sec;   INR: 1.35 ratio         PTT - ( 10 Oct 2021 06:51 )  PTT:82.9 sec    Culture Results:   No growth to date. (10-08 @ 18:34)      RADIOLOGY & ADDITIONAL STUDIES REVIEWED:  ***    [ ]GOALS OF CARE DISCUSSION WITH PATIENT/FAMILY/PROXY:    CRITICAL CARE TIME SPENT: 35 minutes

## 2021-10-11 LAB
ALBUMIN SERPL ELPH-MCNC: 2.2 G/DL — LOW (ref 3.5–5)
ALP SERPL-CCNC: 157 U/L — HIGH (ref 40–120)
ALT FLD-CCNC: 366 U/L DA — HIGH (ref 10–60)
ANION GAP SERPL CALC-SCNC: 7 MMOL/L — SIGNIFICANT CHANGE UP (ref 5–17)
APTT BLD: 98.5 SEC — HIGH (ref 27.5–35.5)
AST SERPL-CCNC: 197 U/L — HIGH (ref 10–40)
BASE EXCESS BLDA CALC-SCNC: 2.4 MMOL/L — SIGNIFICANT CHANGE UP (ref -2–3)
BASOPHILS # BLD AUTO: 0.02 K/UL — SIGNIFICANT CHANGE UP (ref 0–0.2)
BASOPHILS NFR BLD AUTO: 0.4 % — SIGNIFICANT CHANGE UP (ref 0–2)
BILIRUB SERPL-MCNC: 0.5 MG/DL — SIGNIFICANT CHANGE UP (ref 0.2–1.2)
BLOOD GAS COMMENTS ARTERIAL: SIGNIFICANT CHANGE UP
BUN SERPL-MCNC: 28 MG/DL — HIGH (ref 7–18)
CALCIUM SERPL-MCNC: 8.5 MG/DL — SIGNIFICANT CHANGE UP (ref 8.4–10.5)
CHLORIDE SERPL-SCNC: 113 MMOL/L — HIGH (ref 96–108)
CO2 SERPL-SCNC: 27 MMOL/L — SIGNIFICANT CHANGE UP (ref 22–31)
CREAT SERPL-MCNC: 1.62 MG/DL — HIGH (ref 0.5–1.3)
EOSINOPHIL # BLD AUTO: 0.24 K/UL — SIGNIFICANT CHANGE UP (ref 0–0.5)
EOSINOPHIL NFR BLD AUTO: 4.4 % — SIGNIFICANT CHANGE UP (ref 0–6)
GLUCOSE SERPL-MCNC: 125 MG/DL — HIGH (ref 70–99)
HCO3 BLDA-SCNC: 27 MMOL/L — SIGNIFICANT CHANGE UP (ref 21–28)
HCT VFR BLD CALC: 27.2 % — LOW (ref 39–50)
HGB BLD-MCNC: 8.5 G/DL — LOW (ref 13–17)
HOROWITZ INDEX BLDA+IHG-RTO: 40 — SIGNIFICANT CHANGE UP
IMM GRANULOCYTES NFR BLD AUTO: 0.5 % — SIGNIFICANT CHANGE UP (ref 0–1.5)
INR BLD: 1.25 RATIO — HIGH (ref 0.88–1.16)
LYMPHOCYTES # BLD AUTO: 0.87 K/UL — LOW (ref 1–3.3)
LYMPHOCYTES # BLD AUTO: 15.8 % — SIGNIFICANT CHANGE UP (ref 13–44)
MAGNESIUM SERPL-MCNC: 2.4 MG/DL — SIGNIFICANT CHANGE UP (ref 1.6–2.6)
MCHC RBC-ENTMCNC: 29.6 PG — SIGNIFICANT CHANGE UP (ref 27–34)
MCHC RBC-ENTMCNC: 31.3 GM/DL — LOW (ref 32–36)
MCV RBC AUTO: 94.8 FL — SIGNIFICANT CHANGE UP (ref 80–100)
MONOCYTES # BLD AUTO: 0.29 K/UL — SIGNIFICANT CHANGE UP (ref 0–0.9)
MONOCYTES NFR BLD AUTO: 5.3 % — SIGNIFICANT CHANGE UP (ref 2–14)
NEUTROPHILS # BLD AUTO: 4.05 K/UL — SIGNIFICANT CHANGE UP (ref 1.8–7.4)
NEUTROPHILS NFR BLD AUTO: 73.6 % — SIGNIFICANT CHANGE UP (ref 43–77)
NRBC # BLD: 0 /100 WBCS — SIGNIFICANT CHANGE UP (ref 0–0)
PCO2 BLDA: 42 MMHG — SIGNIFICANT CHANGE UP (ref 35–48)
PH BLDA: 7.42 — SIGNIFICANT CHANGE UP (ref 7.35–7.45)
PHOSPHATE SERPL-MCNC: 3.4 MG/DL — SIGNIFICANT CHANGE UP (ref 2.5–4.5)
PLATELET # BLD AUTO: 163 K/UL — SIGNIFICANT CHANGE UP (ref 150–400)
PO2 BLDA: 176 MMHG — HIGH (ref 83–108)
POTASSIUM SERPL-MCNC: 3.7 MMOL/L — SIGNIFICANT CHANGE UP (ref 3.5–5.3)
POTASSIUM SERPL-SCNC: 3.7 MMOL/L — SIGNIFICANT CHANGE UP (ref 3.5–5.3)
PROT SERPL-MCNC: 6.4 G/DL — SIGNIFICANT CHANGE UP (ref 6–8.3)
PROTHROM AB SERPL-ACNC: 14.7 SEC — HIGH (ref 10.6–13.6)
RBC # BLD: 2.87 M/UL — LOW (ref 4.2–5.8)
RBC # FLD: 15.1 % — HIGH (ref 10.3–14.5)
SAO2 % BLDA: 100 % — SIGNIFICANT CHANGE UP
SARS-COV-2 RNA SPEC QL NAA+PROBE: SIGNIFICANT CHANGE UP
SODIUM SERPL-SCNC: 147 MMOL/L — HIGH (ref 135–145)
WBC # BLD: 5.5 K/UL — SIGNIFICANT CHANGE UP (ref 3.8–10.5)
WBC # FLD AUTO: 5.5 K/UL — SIGNIFICANT CHANGE UP (ref 3.8–10.5)

## 2021-10-11 PROCEDURE — 71045 X-RAY EXAM CHEST 1 VIEW: CPT | Mod: 26

## 2021-10-11 PROCEDURE — 99232 SBSQ HOSP IP/OBS MODERATE 35: CPT

## 2021-10-11 RX ORDER — ASPIRIN/CALCIUM CARB/MAGNESIUM 324 MG
81 TABLET ORAL DAILY
Refills: 0 | Status: DISCONTINUED | OUTPATIENT
Start: 2021-10-11 | End: 2021-11-03

## 2021-10-11 RX ORDER — ATORVASTATIN CALCIUM 80 MG/1
80 TABLET, FILM COATED ORAL AT BEDTIME
Refills: 0 | Status: DISCONTINUED | OUTPATIENT
Start: 2021-10-11 | End: 2021-11-03

## 2021-10-11 RX ORDER — POTASSIUM CHLORIDE 20 MEQ
40 PACKET (EA) ORAL ONCE
Refills: 0 | Status: COMPLETED | OUTPATIENT
Start: 2021-10-11 | End: 2021-10-11

## 2021-10-11 RX ORDER — FINASTERIDE 5 MG/1
5 TABLET, FILM COATED ORAL DAILY
Refills: 0 | Status: DISCONTINUED | OUTPATIENT
Start: 2021-10-11 | End: 2021-11-03

## 2021-10-11 RX ORDER — PREGABALIN 225 MG/1
1000 CAPSULE ORAL DAILY
Refills: 0 | Status: DISCONTINUED | OUTPATIENT
Start: 2021-10-11 | End: 2021-11-03

## 2021-10-11 RX ORDER — VANCOMYCIN HCL 1 G
1500 VIAL (EA) INTRAVENOUS DAILY
Refills: 0 | Status: DISCONTINUED | OUTPATIENT
Start: 2021-10-12 | End: 2021-10-14

## 2021-10-11 RX ORDER — METOPROLOL TARTRATE 50 MG
12.5 TABLET ORAL
Refills: 0 | Status: DISCONTINUED | OUTPATIENT
Start: 2021-10-11 | End: 2021-10-12

## 2021-10-11 RX ORDER — FERROUS SULFATE 325(65) MG
300 TABLET ORAL DAILY
Refills: 0 | Status: DISCONTINUED | OUTPATIENT
Start: 2021-10-11 | End: 2021-11-03

## 2021-10-11 RX ADMIN — Medication 81 MILLIGRAM(S): at 11:02

## 2021-10-11 RX ADMIN — CHLORHEXIDINE GLUCONATE 15 MILLILITER(S): 213 SOLUTION TOPICAL at 05:10

## 2021-10-11 RX ADMIN — Medication 40 MILLIEQUIVALENT(S): at 14:17

## 2021-10-11 RX ADMIN — PROPOFOL 15.6 MICROGRAM(S)/KG/MIN: 10 INJECTION, EMULSION INTRAVENOUS at 16:48

## 2021-10-11 RX ADMIN — CHLORHEXIDINE GLUCONATE 1 APPLICATION(S): 213 SOLUTION TOPICAL at 05:10

## 2021-10-11 RX ADMIN — MEROPENEM 100 MILLIGRAM(S): 1 INJECTION INTRAVENOUS at 17:02

## 2021-10-11 RX ADMIN — FINASTERIDE 5 MILLIGRAM(S): 5 TABLET, FILM COATED ORAL at 11:02

## 2021-10-11 RX ADMIN — CHLORHEXIDINE GLUCONATE 15 MILLILITER(S): 213 SOLUTION TOPICAL at 17:02

## 2021-10-11 RX ADMIN — Medication 300 MILLIGRAM(S): at 11:02

## 2021-10-11 RX ADMIN — Medication 2: at 11:02

## 2021-10-11 RX ADMIN — Medication 166.67 MILLIGRAM(S): at 11:04

## 2021-10-11 RX ADMIN — PREGABALIN 1000 MICROGRAM(S): 225 CAPSULE ORAL at 11:02

## 2021-10-11 RX ADMIN — PANTOPRAZOLE SODIUM 40 MILLIGRAM(S): 20 TABLET, DELAYED RELEASE ORAL at 23:00

## 2021-10-11 RX ADMIN — HEPARIN SODIUM 1200 UNIT(S)/HR: 5000 INJECTION INTRAVENOUS; SUBCUTANEOUS at 06:14

## 2021-10-11 RX ADMIN — PROPOFOL 15.6 MICROGRAM(S)/KG/MIN: 10 INJECTION, EMULSION INTRAVENOUS at 05:09

## 2021-10-11 RX ADMIN — MEROPENEM 100 MILLIGRAM(S): 1 INJECTION INTRAVENOUS at 05:09

## 2021-10-11 RX ADMIN — Medication 12.5 MILLIGRAM(S): at 17:02

## 2021-10-11 RX ADMIN — ATORVASTATIN CALCIUM 80 MILLIGRAM(S): 80 TABLET, FILM COATED ORAL at 23:00

## 2021-10-11 RX ADMIN — Medication 2: at 17:01

## 2021-10-11 NOTE — CHART NOTE - NSCHARTNOTEFT_GEN_A_CORE
Contacted the patient's daughter Arabella Oliva and updated her on the patient's condition and about the SAT and SBT. Daughter was concerned about the kidney function and made aware that kidney function was improving.

## 2021-10-11 NOTE — PROGRESS NOTE ADULT - SUBJECTIVE AND OBJECTIVE BOX
Chief Complaint:  Patient is a 76y old  Male who presents with a chief complaint of R Foot Pain (11 Oct 2021 12:35)      Reason for consult:    Interval Events:     Hospital Medications:  aspirin  chewable 81 milliGRAM(s) Oral daily  atorvastatin 80 milliGRAM(s) Oral at bedtime  chlorhexidine 0.12% Liquid 15 milliLiter(s) Oral Mucosa every 12 hours  chlorhexidine 2% Cloths 1 Application(s) Topical <User Schedule>  cyanocobalamin 1000 MICROGram(s) Oral daily  ergocalciferol 81408 Unit(s) Oral <User Schedule>  ferrous    sulfate Liquid 300 milliGRAM(s) Enteral Tube daily  finasteride 5 milliGRAM(s) Oral daily  heparin  Infusion.  Unit(s)/Hr IV Continuous <Continuous>  insulin lispro (ADMELOG) corrective regimen sliding scale   SubCutaneous every 6 hours  meropenem  IVPB 1000 milliGRAM(s) IV Intermittent every 12 hours  metoprolol tartrate 12.5 milliGRAM(s) Oral two times a day  ondansetron Injectable 4 milliGRAM(s) IV Push three times a day PRN  pantoprazole  Injectable 40 milliGRAM(s) IV Push at bedtime  propofol Infusion 30 MICROgram(s)/kG/Min IV Continuous <Continuous>  sodium chloride 0.9% lock flush 10 milliLiter(s) IV Push every 1 hour PRN  vancomycin  IVPB 1250 milliGRAM(s) IV Intermittent daily      ROS:   General:  No  fevers, chills, night sweats, fatigue  Eyes:  Good vision, no reported pain  ENT:  No sore throat, pain, runny nose  CV:  No pain, palpitations  Pulm:  No dyspnea, cough  GI:  See HPI, otherwise negative  :  No  incontinence, nocturia  Muscle:  No pain, weakness  Neuro:  No memory problems  Psych:  No insomnia, mood problems, depression  Endocrine:  No polyuria, polydipsia, cold/heat intolerance  Heme:  No petechiae, ecchymosis, easy bruisability  Skin:  No rash    PHYSICAL EXAM:   Vital Signs:  Vital Signs Last 24 Hrs  T(C): 36.7 (11 Oct 2021 11:00), Max: 36.7 (11 Oct 2021 11:00)  T(F): 98 (11 Oct 2021 11:00), Max: 98 (11 Oct 2021 11:00)  HR: 103 (11 Oct 2021 13:00) (64 - 103)  BP: 148/72 (11 Oct 2021 13:00) (97/42 - 148/72)  BP(mean): 90 (11 Oct 2021 13:00) (56 - 95)  RR: 20 (11 Oct 2021 13:00) (10 - 25)  SpO2: 100% (11 Oct 2021 13:00) (90% - 100%)  Daily     Daily Weight in k.6 (11 Oct 2021 07:00)    GENERAL: no acute distress  NEURO: alert, no asterixis  HEENT: anicteric sclera, no conjunctival pallor appreciated  CHEST: no respiratory distress, no accessory muscle use  CARDIAC: regular rate, rhythm  ABDOMEN: soft, non-tender, non-distended, no rebound or guarding  EXTREMITIES: warm, well perfused, no edema  SKIN: no lesions noted    LABS: reviewed                        8.5    5.50  )-----------( 163      ( 11 Oct 2021 05:44 )             27.2     10-11    147<H>  |  113<H>  |  28<H>  ----------------------------<  125<H>  3.7   |  27  |  1.62<H>    Ca    8.5      11 Oct 2021 05:43  Phos  3.4     10-11  Mg     2.4     10-11    TPro  6.4  /  Alb  2.2<L>  /  TBili  0.5  /  DBili  x   /  AST  197<H>  /  ALT  366<H>  /  AlkPhos  157<H>  10-11    LIVER FUNCTIONS - ( 11 Oct 2021 05:43 )  Alb: 2.2 g/dL / Pro: 6.4 g/dL / ALK PHOS: 157 U/L / ALT: 366 U/L DA / AST: 197 U/L / GGT: x             Interval Diagnostic Studies: see sunrise for full report   Chief Complaint:  Patient is a 76y old  Male who presents with a chief complaint of R Foot Pain (11 Oct 2021 12:35)      Reason for consult: Abnormal liver enzymes    Interval Events: Patient was seen and examined at bedside. He is off pressor and off sedation when at bedside. Remains with chest tube. Liver transaminases are improving. AST 1057-->197, --> 366, but ALP rising, 157. Bilirubin remains normal. INR 1.25.  Was started on AC (heparin drip) for new onset of A fib.    Hospital Medications:  aspirin  chewable 81 milliGRAM(s) Oral daily  atorvastatin 80 milliGRAM(s) Oral at bedtime  chlorhexidine 0.12% Liquid 15 milliLiter(s) Oral Mucosa every 12 hours  chlorhexidine 2% Cloths 1 Application(s) Topical <User Schedule>  cyanocobalamin 1000 MICROGram(s) Oral daily  ergocalciferol 87706 Unit(s) Oral <User Schedule>  ferrous    sulfate Liquid 300 milliGRAM(s) Enteral Tube daily  finasteride 5 milliGRAM(s) Oral daily  heparin  Infusion.  Unit(s)/Hr IV Continuous <Continuous>  insulin lispro (ADMELOG) corrective regimen sliding scale   SubCutaneous every 6 hours  meropenem  IVPB 1000 milliGRAM(s) IV Intermittent every 12 hours  metoprolol tartrate 12.5 milliGRAM(s) Oral two times a day  ondansetron Injectable 4 milliGRAM(s) IV Push three times a day PRN  pantoprazole  Injectable 40 milliGRAM(s) IV Push at bedtime  propofol Infusion 30 MICROgram(s)/kG/Min IV Continuous <Continuous>  sodium chloride 0.9% lock flush 10 milliLiter(s) IV Push every 1 hour PRN  vancomycin  IVPB 1250 milliGRAM(s) IV Intermittent daily      ROS:   Unable to obtain due to patient condition.    PHYSICAL EXAM:   Vital Signs:  Vital Signs Last 24 Hrs  T(C): 36.7 (11 Oct 2021 11:00), Max: 36.7 (11 Oct 2021 11:00)  T(F): 98 (11 Oct 2021 11:00), Max: 98 (11 Oct 2021 11:00)  HR: 103 (11 Oct 2021 13:00) (64 - 103)  BP: 148/72 (11 Oct 2021 13:00) (97/42 - 148/72)  BP(mean): 90 (11 Oct 2021 13:00) (56 - 95)  RR: 20 (11 Oct 2021 13:00) (10 - 25)  SpO2: 100% (11 Oct 2021 13:00) (90% - 100%)  Daily     Daily Weight in k.6 (11 Oct 2021 07:00)    GENERAL: critically ill  NEURO: off sedation when at bedside; moves UEs and head, not following commands  HEENT: anicteric sclera, no conjunctival pallor appreciated  CHEST: intubated, on vent, with chest tube  CARDIAC: mildly tachycardic  ABDOMEN: soft, non-tender, non-distended, no rebound or guarding, BS+  EXTREMITIES: R foot wound  SKIN: Ecchymoses (on abdominal wall)    LABS: reviewed                        8.5    5.50  )-----------( 163      ( 11 Oct 2021 05:44 )             27.2     10-11    147<H>  |  113<H>  |  28<H>  ----------------------------<  125<H>  3.7   |  27  |  1.62<H>    Ca    8.5      11 Oct 2021 05:43  Phos  3.4     10-11  Mg     2.4     10-11    TPro  6.4  /  Alb  2.2<L>  /  TBili  0.5  /  DBili  x   /  AST  197<H>  /  ALT  366<H>  /  AlkPhos  157<H>  10-11    LIVER FUNCTIONS - ( 11 Oct 2021 05:43 )  Alb: 2.2 g/dL / Pro: 6.4 g/dL / ALK PHOS: 157 U/L / ALT: 366 U/L DA / AST: 197 U/L / GGT: x             Interval Diagnostic Studies: see sunrise for full report

## 2021-10-11 NOTE — PROGRESS NOTE ADULT - SUBJECTIVE AND OBJECTIVE BOX
Patient is seen and examined at the bed side, is afebrile. He remains intubated/sedated.  The kidney function continues  to improve.       REVIEW OF SYSTEMS: Unable to obtain due to mental status      ALLERGIES: No Known Allergies      ICU Vital Signs Last 24 Hrs  T(C): 36.9 (11 Oct 2021 19:09), Max: 36.9 (11 Oct 2021 19:09)  T(F): 98.5 (11 Oct 2021 19:09), Max: 98.5 (11 Oct 2021 19:09)  HR: 76 (11 Oct 2021 19:00) (66 - 103)  BP: 106/49 (11 Oct 2021 19:00) (104/48 - 148/72)  BP(mean): 61 (11 Oct 2021 19:00) (61 - 95)  ABP: --  ABP(mean): --  RR: 18 (11 Oct 2021 19:00) (10 - 25)  SpO2: 99% (11 Oct 2021 19:00) (90% - 100%)      PHYSICAL EXAM:  GENERAL: Intubated/vented  CHEST/LUNG:  Not using accessory muscles  HEART: s1 and s2 present  ABDOMEN:  Mild distended   EXTREMITIES: Right foot bandage in placed   CNS: Intubated/ vented      LABS:                          8.5    5.50  )-----------( 163      ( 11 Oct 2021 05:44 )             27.2                         8.7    10.30 )-----------( 244      ( 08 Oct 2021 03:52 )             27.1         10-11    147<H>  |  113<H>  |  28<H>  ----------------------------<  125<H>  3.7   |  27  |  1.62<H>    Ca    8.5      11 Oct 2021 05:43  Phos  3.4     10-11  Mg     2.4     10-11    TPro  6.4  /  Alb  2.2<L>  /  TBili  0.5  /  DBili  x   /  AST  197<H>  /  ALT  366<H>  /  AlkPhos  157<H>  10-11    10-10    146<H>  |  110<H>  |  32<H>  ----------------------------<  110<H>  3.3<L>   |  30  |  1.95<H>    Ca    8.2<L>      10 Oct 2021 03:54  Phos  3.7     10-10  Mg     2.3     10-10    TPro  6.3  /  Alb  2.4<L>  /  TBili  0.5  /  DBili  x   /  AST  489<H>  /  ALT  559<H>  /  AlkPhos  146<H>  10-10      09-25    139  |  107  |  41<H>  ----------------------------<  134<H>  4.1   |  21<L>  |  4.05<H>    Ca    8.6      25 Sep 2021 06:40    TPro  6.6  /  Alb  2.3<L>  /  TBili  0.5  /  DBili  x   /  AST  25  /  ALT  15  /  AlkPhos  102  09-25        CAPILLARY BLOOD GLUCOSE  POCT Blood Glucose.: 134 mg/dL (17 Sep 2021 17:11)  POCT Blood Glucose.: 128 mg/dL (17 Sep 2021 11:06)  POCT Blood Glucose.: 157 mg/dL (17 Sep 2021 04:10)      MEDICATIONS  (STANDING):    aspirin  chewable 81 milliGRAM(s) Oral daily  atorvastatin 80 milliGRAM(s) Oral at bedtime  chlorhexidine 0.12% Liquid 15 milliLiter(s) Oral Mucosa every 12 hours  chlorhexidine 2% Cloths 1 Application(s) Topical <User Schedule>  cyanocobalamin 1000 MICROGram(s) Oral daily  ergocalciferol 40173 Unit(s) Oral <User Schedule>  ferrous    sulfate Liquid 300 milliGRAM(s) Enteral Tube daily  finasteride 5 milliGRAM(s) Oral daily  heparin  Infusion.  Unit(s)/Hr (16 mL/Hr) IV Continuous <Continuous>  insulin lispro (ADMELOG) corrective regimen sliding scale   SubCutaneous every 6 hours  meropenem  IVPB 1000 milliGRAM(s) IV Intermittent every 12 hours  metoprolol tartrate 12.5 milliGRAM(s) Oral two times a day  pantoprazole  Injectable 40 milliGRAM(s) IV Push at bedtime  propofol Infusion 30 MICROgram(s)/kG/Min (15.6 mL/Hr) IV Continuous <Continuous>      RADIOLOGY & ADDITIONAL TESTS:    10/5/21 CT Chest No Cont (10.05.21 @ 14:07) Pulmonary edema with bilateral moderate to large pleural effusions. Underlying bilateral lower lobe compressive atelectasis versus pneumonia.  Mildly enlarged mediastinal lymph nodes, possibly reactive.      10/2/21: Xray Chest 1 View- PORTABLE-Routine (Xray Chest 1 View- PORTABLE-Routine in AM.) (10.02.21 @ 09:46) There is persistent bilateral perihilar/basilar diffuse airspace disease and/or RIGHT effusion.  Cardiomegaly.  No interval change.    Collected Date/Time: 9/22/2021 08:42 EDT   Received Date/Time: 9/23/2021 09:29 EDT   Surgical Pathology Report - Auth (Verified)   Specimen(s) Submitted   1 Right 5th Toe Wound Debridement r/o Osteomyelitis   Final Diagnosis   Right fifth toe; wound debridement:   - Bone with acute osteomyelitis and necrotic periosteal tissue.     9/28/21: US Abdomen Upper Quadrant Right (09.28.21 @ 12:13) Cholelithiasis without evidence of acute cholecystitis. Note is made of right pleural effusion    9/27/21: CT Abdomen and Pelvis w/ Oral Cont (09.27.21 @ 15:53) >No acute intra-abdominal pathology.    Small bilateral pleural effusions with compressive atelectasis of both lower lobes.    9/26/21: Xray Chest 1 View-PORTABLE IMMEDIATE (Xray Chest 1 View-PORTABLE IMMEDIATE .) (09.26.21 @ 15:28) : Small bilateral pleural effusions and mild pulmonary edema. A right lower lobe pneumonia is not excluded.      9/26/21: Xray Abdomen 1 View Portable, IMMEDIATE (Xray Abdomen 1 View Portable, IMMEDIATE .) (09.26.21 @ 15:28) : There is a nasogastric tube with its tip in the stomach. There is gaseous distention of the colon. No pathologic calcifications are seen. The osseous structures are intact with degenerative change is present in the spine.      9/17/21 : MR Foot No Cont, Right (09.17.21 @ 13:04) : Resection at the mid aspect of the fifth metatarsal. Lateral soft tissue wound with marrow signal abnormality throughout the fifth metatarsal and within the adjacent fourth metatarsal head which is nonspecific in the setting of recent surgery although osteomyelitis is suspected.    9/16/21 : Xray Foot AP + Lateral + Oblique, Right (09.16.21 @ 15:03) : No acute finding. No plain film evidence of osteomyelitis.        MICROBIOLOGY DATA:    Urine Microscopic-Add On (NC) (09.22.21 @ 16:14)   Red Blood Cell - Urine: 0-2 /HPF   White Blood Cell - Urine: 3-5 /HPF   Bacteria: Moderate /HPF   Comment - Urine: yeast cells present   Epithelial Cells: Moderate /HPF     Culture - Tissue with Gram Stain (09.22.21 @ 13:54)   Gram Stain: No polymorphonuclear cells seen per low power field   No organisms seen per oil power field   Specimen Source: .Tissue Right 5th toe r/o osteomyeltis   Culture Results: No growth     COVID-19 David Domain Antibody (09.18.21 @ 12:55)   COVID-19 David Domain Antibody Result: >250.00:    Culture - Blood (09.17.21 @ 10:28)   Specimen Source: .Blood Blood-Peripheral   Culture Results: No growth to date.     Culture - Blood (09.17.21 @ 10:28)   Specimen Source: .Blood Blood-Venous   Culture Results: No growth to date.     Culture - Surgical Swab (09.16.21 @ 21:42)   - Amikacin: S <=16   - Amikacin: S <=16   - Amoxicillin/Clavulanic Acid: S <=8/4   - Ampicillin: R >16 These ampicillin results predict results for amoxicillin   - Ampicillin: S <=2 Predicts results to ampicillin/sulbactam, amoxacillin-clavulanate and piperacillin-tazobactam.   - Ampicillin/Sulbactam: S 8/4 Enterobacter, Citrobacter, and Serratia may develop resistance during prolonged therapy (3-4 days)   - Ampicillin/Sulbactam: R >16/8   - Aztreonam: S <=4   - Aztreonam: S <=4   - Cefazolin: R 16 Enterobacter, Citrobacter, and Serratia may develop resistance during prolonged therapy (3-4 days)   - Cefazolin: R >16   - Cefepime: S <=2   - Cefepime: S <=2   - Cefoxitin: S <=8   - Cefoxitin: S <=8   - Ceftazidime: S <=1   - Ceftriaxone: S <=1   - Ceftriaxone: S <=1 Enterobacter, Citrobacter, and Serratia may develop resistance during prolonged therapy   - Ciprofloxacin: S <=0.25   - Ciprofloxacin: S <=0.25   - Ertapenem: S <=0.5   - Gentamicin: S <=2   - Gentamicin: S <=2   - Imipenem: S <=1   - Levofloxacin: S <=0.5   - Levofloxacin: S <=0.5   - Meropenem: S <=1   - Meropenem: S <=1   - Piperacillin/Tazobactam: S <=8   - Piperacillin/Tazobactam: S <=8   - Tetra/Doxy: S 4   - Tobramycin: S <=2   - Trimethoprim/Sulfamethoxazole: S <=0.5/9.5   - Trimethoprim/Sulfamethoxazole: S <=0.5/9.5   - Vancomycin: S 2   Specimen Source: .Surgical Swab right foot wound   Culture Results:   Culture yields >4 types of aerobic and/or anaerobic bacteria   Call client services within 7 days if further workup is clinically   indicated. Culture includes   Rare Aeromonas hydrophila/caviae   Few Klebsiella oxytoca/Raoutella ornithinolytica   Few Enterococcus faecalis   Few Streptococcus agalactiae (Group B) isolated   Group B streptococci are susceptible to ampicillin,   penicillin and cefazolin, but may be resistant to   erythromycin and clindamycin.   Recommendations for intrapartum prophylaxis for Group B   streptococci are penicillin or ampicillin.   Organism Identification: Aeromonas hydrophila/caviae   Klebsiella oxytoca /Raoutella ornithinolytica   Enterococcus faecalis   Organism: Aeromonas hydrophila/caviae   Organism: Klebsiella oxytoca /Raoutella ornithinolytica   Organism: Enterococcus faecalis   COVID-19 PCR . (09.16.21 @ 22:24)   COVID-19 PCR: NotDetec:             Patient is seen and examined at the bed side, is afebrile. He remains intubated/sedated and off pressor today.  The kidney function continues to improve.       REVIEW OF SYSTEMS: Unable to obtain due to mental status      ALLERGIES: No Known Allergies      ICU Vital Signs Last 24 Hrs  T(C): 36.9 (11 Oct 2021 19:09), Max: 36.9 (11 Oct 2021 19:09)  T(F): 98.5 (11 Oct 2021 19:09), Max: 98.5 (11 Oct 2021 19:09)  HR: 76 (11 Oct 2021 19:00) (66 - 103)  BP: 106/49 (11 Oct 2021 19:00) (104/48 - 148/72)  BP(mean): 61 (11 Oct 2021 19:00) (61 - 95)  ABP: --  ABP(mean): --  RR: 18 (11 Oct 2021 19:00) (10 - 25)  SpO2: 99% (11 Oct 2021 19:00) (90% - 100%)      PHYSICAL EXAM:  GENERAL: Intubated/vented  CHEST/LUNG:  Not using accessory muscles, Right CT in placed   HEART: s1 and s2 present  ABDOMEN:  Mild distended   EXTREMITIES: Right foot bandage in placed   CNS: Intubated/ vented      LABS:                          8.5    5.50  )-----------( 163      ( 11 Oct 2021 05:44 )             27.2                         8.7    10.30 )-----------( 244      ( 08 Oct 2021 03:52 )             27.1         10-11    147<H>  |  113<H>  |  28<H>  ----------------------------<  125<H>  3.7   |  27  |  1.62<H>    Ca    8.5      11 Oct 2021 05:43  Phos  3.4     10-11  Mg     2.4     10-11    TPro  6.4  /  Alb  2.2<L>  /  TBili  0.5  /  DBili  x   /  AST  197<H>  /  ALT  366<H>  /  AlkPhos  157<H>  10-11    10-10    146<H>  |  110<H>  |  32<H>  ----------------------------<  110<H>  3.3<L>   |  30  |  1.95<H>    Ca    8.2<L>      10 Oct 2021 03:54  Phos  3.7     10-10  Mg     2.3     10-10    TPro  6.3  /  Alb  2.4<L>  /  TBili  0.5  /  DBili  x   /  AST  489<H>  /  ALT  559<H>  /  AlkPhos  146<H>  10-10      09-25    139  |  107  |  41<H>  ----------------------------<  134<H>  4.1   |  21<L>  |  4.05<H>    Ca    8.6      25 Sep 2021 06:40    TPro  6.6  /  Alb  2.3<L>  /  TBili  0.5  /  DBili  x   /  AST  25  /  ALT  15  /  AlkPhos  102  09-25        CAPILLARY BLOOD GLUCOSE  POCT Blood Glucose.: 134 mg/dL (17 Sep 2021 17:11)  POCT Blood Glucose.: 128 mg/dL (17 Sep 2021 11:06)  POCT Blood Glucose.: 157 mg/dL (17 Sep 2021 04:10)      MEDICATIONS  (STANDING):    aspirin  chewable 81 milliGRAM(s) Oral daily  atorvastatin 80 milliGRAM(s) Oral at bedtime  chlorhexidine 0.12% Liquid 15 milliLiter(s) Oral Mucosa every 12 hours  chlorhexidine 2% Cloths 1 Application(s) Topical <User Schedule>  cyanocobalamin 1000 MICROGram(s) Oral daily  ergocalciferol 94653 Unit(s) Oral <User Schedule>  ferrous    sulfate Liquid 300 milliGRAM(s) Enteral Tube daily  finasteride 5 milliGRAM(s) Oral daily  heparin  Infusion.  Unit(s)/Hr (16 mL/Hr) IV Continuous <Continuous>  insulin lispro (ADMELOG) corrective regimen sliding scale   SubCutaneous every 6 hours  meropenem  IVPB 1000 milliGRAM(s) IV Intermittent every 12 hours  metoprolol tartrate 12.5 milliGRAM(s) Oral two times a day  pantoprazole  Injectable 40 milliGRAM(s) IV Push at bedtime  propofol Infusion 30 MICROgram(s)/kG/Min (15.6 mL/Hr) IV Continuous <Continuous>      RADIOLOGY & ADDITIONAL TESTS:    10/5/21 CT Chest No Cont (10.05.21 @ 14:07) Pulmonary edema with bilateral moderate to large pleural effusions. Underlying bilateral lower lobe compressive atelectasis versus pneumonia.  Mildly enlarged mediastinal lymph nodes, possibly reactive.      10/2/21: Xray Chest 1 View- PORTABLE-Routine (Xray Chest 1 View- PORTABLE-Routine in AM.) (10.02.21 @ 09:46) There is persistent bilateral perihilar/basilar diffuse airspace disease and/or RIGHT effusion.  Cardiomegaly.  No interval change.    Collected Date/Time: 9/22/2021 08:42 EDT   Received Date/Time: 9/23/2021 09:29 EDT   Surgical Pathology Report - Auth (Verified)   Specimen(s) Submitted   1 Right 5th Toe Wound Debridement r/o Osteomyelitis   Final Diagnosis   Right fifth toe; wound debridement:   - Bone with acute osteomyelitis and necrotic periosteal tissue.     9/28/21: US Abdomen Upper Quadrant Right (09.28.21 @ 12:13) Cholelithiasis without evidence of acute cholecystitis. Note is made of right pleural effusion    9/27/21: CT Abdomen and Pelvis w/ Oral Cont (09.27.21 @ 15:53) >No acute intra-abdominal pathology.    Small bilateral pleural effusions with compressive atelectasis of both lower lobes.    9/26/21: Xray Chest 1 View-PORTABLE IMMEDIATE (Xray Chest 1 View-PORTABLE IMMEDIATE .) (09.26.21 @ 15:28) : Small bilateral pleural effusions and mild pulmonary edema. A right lower lobe pneumonia is not excluded.      9/26/21: Xray Abdomen 1 View Portable, IMMEDIATE (Xray Abdomen 1 View Portable, IMMEDIATE .) (09.26.21 @ 15:28) : There is a nasogastric tube with its tip in the stomach. There is gaseous distention of the colon. No pathologic calcifications are seen. The osseous structures are intact with degenerative change is present in the spine.      9/17/21 : MR Foot No Cont, Right (09.17.21 @ 13:04) : Resection at the mid aspect of the fifth metatarsal. Lateral soft tissue wound with marrow signal abnormality throughout the fifth metatarsal and within the adjacent fourth metatarsal head which is nonspecific in the setting of recent surgery although osteomyelitis is suspected.    9/16/21 : Xray Foot AP + Lateral + Oblique, Right (09.16.21 @ 15:03) : No acute finding. No plain film evidence of osteomyelitis.        MICROBIOLOGY DATA:    Urine Microscopic-Add On (NC) (09.22.21 @ 16:14)   Red Blood Cell - Urine: 0-2 /HPF   White Blood Cell - Urine: 3-5 /HPF   Bacteria: Moderate /HPF   Comment - Urine: yeast cells present   Epithelial Cells: Moderate /HPF     Culture - Tissue with Gram Stain (09.22.21 @ 13:54)   Gram Stain: No polymorphonuclear cells seen per low power field   No organisms seen per oil power field   Specimen Source: .Tissue Right 5th toe r/o osteomyeltis   Culture Results: No growth     COVID-19 David Domain Antibody (09.18.21 @ 12:55)   COVID-19 David Domain Antibody Result: >250.00:    Culture - Blood (09.17.21 @ 10:28)   Specimen Source: .Blood Blood-Peripheral   Culture Results: No growth to date.     Culture - Blood (09.17.21 @ 10:28)   Specimen Source: .Blood Blood-Venous   Culture Results: No growth to date.     Culture - Surgical Swab (09.16.21 @ 21:42)   - Amikacin: S <=16   - Amikacin: S <=16   - Amoxicillin/Clavulanic Acid: S <=8/4   - Ampicillin: R >16 These ampicillin results predict results for amoxicillin   - Ampicillin: S <=2 Predicts results to ampicillin/sulbactam, amoxacillin-clavulanate and piperacillin-tazobactam.   - Ampicillin/Sulbactam: S 8/4 Enterobacter, Citrobacter, and Serratia may develop resistance during prolonged therapy (3-4 days)   - Ampicillin/Sulbactam: R >16/8   - Aztreonam: S <=4   - Aztreonam: S <=4   - Cefazolin: R 16 Enterobacter, Citrobacter, and Serratia may develop resistance during prolonged therapy (3-4 days)   - Cefazolin: R >16   - Cefepime: S <=2   - Cefepime: S <=2   - Cefoxitin: S <=8   - Cefoxitin: S <=8   - Ceftazidime: S <=1   - Ceftriaxone: S <=1   - Ceftriaxone: S <=1 Enterobacter, Citrobacter, and Serratia may develop resistance during prolonged therapy   - Ciprofloxacin: S <=0.25   - Ciprofloxacin: S <=0.25   - Ertapenem: S <=0.5   - Gentamicin: S <=2   - Gentamicin: S <=2   - Imipenem: S <=1   - Levofloxacin: S <=0.5   - Levofloxacin: S <=0.5   - Meropenem: S <=1   - Meropenem: S <=1   - Piperacillin/Tazobactam: S <=8   - Piperacillin/Tazobactam: S <=8   - Tetra/Doxy: S 4   - Tobramycin: S <=2   - Trimethoprim/Sulfamethoxazole: S <=0.5/9.5   - Trimethoprim/Sulfamethoxazole: S <=0.5/9.5   - Vancomycin: S 2   Specimen Source: .Surgical Swab right foot wound   Culture Results:   Culture yields >4 types of aerobic and/or anaerobic bacteria   Call client services within 7 days if further workup is clinically   indicated. Culture includes   Rare Aeromonas hydrophila/caviae   Few Klebsiella oxytoca/Raoutella ornithinolytica   Few Enterococcus faecalis   Few Streptococcus agalactiae (Group B) isolated   Group B streptococci are susceptible to ampicillin,   penicillin and cefazolin, but may be resistant to   erythromycin and clindamycin.   Recommendations for intrapartum prophylaxis for Group B   streptococci are penicillin or ampicillin.   Organism Identification: Aeromonas hydrophila/caviae   Klebsiella oxytoca /Raoutella ornithinolytica   Enterococcus faecalis   Organism: Aeromonas hydrophila/caviae   Organism: Klebsiella oxytoca /Raoutella ornithinolytica   Organism: Enterococcus faecalis   COVID-19 PCR . (09.16.21 @ 22:24)   COVID-19 PCR: NotDetec:

## 2021-10-11 NOTE — PROGRESS NOTE ADULT - ATTENDING COMMENTS
IMP: 76 yr old man from home, lives with daughter with DM- uncontrol  on insulin, HTN, HLD, CAD, PSH R partial 5th ray amputation 8/19/2021 p/w R foot pain x1 day associated with foul smelling discharge due to osteomyelitis being treated with iv antibx .  ICU admission for hypoxia requiring mechanical ventilation      Assessment:  - Acute Hypoxic Respiratory Failure  - Pulmonary Edema  - Pleural Effusion   - Osteomyelitis of right foot   - OREN superimposed on CKD stage 3  - Severe Aortic stenosis  - CAD s/p CABG   - Cholelithiasis   - HTN  - HLD  - DM uncontrolled  - Diabetic foot ulcer  - New onset Atrial fibrillation       Plan   -On mechanical ventilation   -awakening trial with sedation vacation  -Daily weaning trials   -Chest tube to water seal per surgery   -Pleural effusion appears to be transudative   -continue  therapeutic anticoagulation for afib  -Restart home dose beta blockers for rate control  -cards following   -Septic shock, though now off vasopressors  -Cont ASA and statin   -hemodynamic monitoring   -LFT trending up  (was  on levaquin and Unasyn)  -continue antibx with vancomycin/meropenem, adjust dose per GFR as improving renal function   -monitor blood sugar with coverage   -advance directives  -DVT/ GI prophylaxis

## 2021-10-11 NOTE — PROGRESS NOTE ADULT - SUBJECTIVE AND OBJECTIVE BOX
Podiatry Interval: Pt seen at bedside in ICU. Unable to attain a thorough history and physical from patient today.     Podiatry HPI: 76 year old male with a PMHx of DM, HTN, HLD, CAD to ED presents to the ED for a Right Foot s/p 5th foot partial ray resection and fillet toe flap. Patient states that he has sharp localized non-radiating pain to the Right foot 5th metatarsal. States that after his surgery, he did not see a podiatrist and he came into the ED because he saw drainage and was having pain. Denies any constitutional symptoms of N/V/C/F/SOB. Denies any other pedal complaints at this time. Denies calf pain today, bilaterally.          Medications aspirin  chewable 81 milliGRAM(s) Oral daily  atorvastatin 80 milliGRAM(s) Oral at bedtime  chlorhexidine 0.12% Liquid 15 milliLiter(s) Oral Mucosa every 12 hours  chlorhexidine 2% Cloths 1 Application(s) Topical <User Schedule>  cyanocobalamin 1000 MICROGram(s) Oral daily  ergocalciferol 12809 Unit(s) Oral <User Schedule>  fentaNYL   Infusion 0.5 MICROgram(s)/kG/Hr IV Continuous <Continuous>  ferrous    sulfate Liquid 300 milliGRAM(s) Enteral Tube daily  finasteride 5 milliGRAM(s) Oral daily  heparin  Infusion.  Unit(s)/Hr IV Continuous <Continuous>  insulin lispro (ADMELOG) corrective regimen sliding scale   SubCutaneous every 6 hours  meropenem  IVPB 1000 milliGRAM(s) IV Intermittent every 12 hours  ondansetron Injectable 4 milliGRAM(s) IV Push three times a day PRN  pantoprazole  Injectable 40 milliGRAM(s) IV Push at bedtime  propofol Infusion 30 MICROgram(s)/kG/Min IV Continuous <Continuous>  sodium chloride 0.9% lock flush 10 milliLiter(s) IV Push every 1 hour PRN  vancomycin  IVPB 1250 milliGRAM(s) IV Intermittent daily    FHFamily history of acute myocardial infarction (Father)    Family history of diabetes mellitus (Mother, Sibling)    Family history of hypertension (Mother, Sibling)    Family history of hyperlipidemia (Mother, Sibling)    ,   PMHHTN (hypertension)    HLD (hyperlipidemia)    DM (diabetes mellitus)    CAD (coronary artery disease)    Glaucoma, angle-closure    [place] (hard of hearing)       PSHNo significant past surgical history    S/P CABG (coronary artery bypass graft)    History of thyroid surgery        Labs                          8.5    5.50  )-----------( 163      ( 11 Oct 2021 05:44 )             27.2      10-11    147<H>  |  113<H>  |  28<H>  ----------------------------<  125<H>  3.7   |  27  |  1.62<H>    Ca    8.5      11 Oct 2021 05:43  Phos  3.4     10-11  Mg     2.4     10-11    TPro  6.4  /  Alb  2.2<L>  /  TBili  0.5  /  DBili  x   /  AST  197<H>  /  ALT  366<H>  /  AlkPhos  157<H>  10-11     Vital Signs Last 24 Hrs  T(C): 36 (11 Oct 2021 07:00), Max: 36.6 (10 Oct 2021 13:00)  T(F): 96.8 (11 Oct 2021 07:00), Max: 97.8 (10 Oct 2021 13:00)  HR: 74 (11 Oct 2021 07:00) (64 - 78)  BP: 114/50 (11 Oct 2021 07:00) (97/42 - 144/62)  BP(mean): 66 (11 Oct 2021 07:00) (56 - 82)  RR: 12 (11 Oct 2021 07:00) (10 - 19)  SpO2: 99% (11 Oct 2021 07:00) (99% - 100%)      Sedimentation Rate, Erythrocyte: 85 mm/Hr (10-08-21 @ 03:52)  Sedimentation Rate, Erythrocyte: 108 mm/Hr (10-02-21 @ 07:02)         C-Reactive Protein, Serum: 53 mg/L (10-08-21 @ 12:10)  C-Reactive Protein, Serum: 43 mg/L (10-02-21 @ 14:05)  C-Reactive Protein, Serum: 45 mg/L (09-16-21 @ 23:33)  C-Reactive Protein, Serum: 85 mg/L (08-22-21 @ 21:30)  C-Reactive Protein, Serum: 67 mg/L (08-15-21 @ 11:55)   WBC Count: 5.50 K/uL (10-11-21 @ 05:44)      PT/INR - ( 07 Oct 2021 04:07 )   PT: 16.6 sec;   INR: 1.42 ratio         CAPILLARY BLOOD GLUCOSE      POCT Blood Glucose.: 138 mg/dL (10.11.21 @ 05:39)   POCT Blood Glucose.: 121 mg/dL (08 Oct 2021 05:38)  POCT Blood Glucose.: 130 mg/dL (07 Oct 2021 22:01)  POCT Blood Glucose.: 149 mg/dL (07 Oct 2021 17:22)  POCT Blood Glucose.: 172 mg/dL (07 Oct 2021 11:12)      ROS: All others negative unless otherwise stated in the HPI        PHYSICAL EXAM Kept bandage intact   LE Focused:    Vasc:  DP/PT pulses were faintly palpable, bilateral. DP/PT pulses were monophasic on doppler, bilaterally. CFT<3 seconds to all digits.   Derm: Surgical site with graft in place to R 5th ray, granular wound bed through the graft with patches of necrotic islands noted, minimal drainage or discharge. minimal erythema and edema noted, eschar forming over the wound. Dorsal foot wound noted with tendon exposed, DTI noted to b/l heel  Neuro: Protective sensation grossly diminished, b/l  MSK: 5/5 muscle strength noted to the pedal compartments. 5th digit amputation R foot        Imaging:    3 views right foot. Prior 8/20/2021.    Status post resection of the fifth toe and distal fifth metatarsal unchanged in appearance. No focal bone lysis or unusual periosteal reaction to suggest osteomyelitis. No acute fracture or dislocation. The remainder study unchanged. No soft tissue gas.    IMPRESSION: No acute finding. No plain film evidence of osteomyelitis.      MRI R foot:     INTERPRETATION:  Clinical Information: Recent fifth metatarsal head resection now with fifth digit pain, swelling and foul discharge.    Comparison: Radiographs of the right foot from 9/16/2021 and MRI the right foot from 8/16/2021.    Technique:  MRI of the right midfoot and forefoot.  Intravenous Contrast: None.    Findings:    There is a resection at the mid aspect of the fifth metatarsal shaft. There is a lateral soft tissue wound beginning at the level of the amputation which extends distally. There is susceptibility artifact in the region of the amputation consistent with postoperative change. There is hyperintense T2 marrow signal throughout the remaining fifth metatarsal and within the adjacent fourth metatarsal head which is nonspecific and could be related to recent postoperative changes although osteomyelitis is suspected.    There is edema and mild atrophy within the plantar muscles of the foot. There is minimal spurring at the first metatarsophalangeal and hallux sesamoid articulations.    Impression:  Resection at the mid aspect of the fifth metatarsal. Lateral soft tissue wound with marrow signal abnormality throughout the fifth metatarsal and within the adjacent fourth metatarsal head which is nonspecific in the setting of recent surgery although osteomyelitis is suspected.        Culture Results:     Rare Aeromonas hydrophila/caviae   Few Klebsiella oxytoca/Raoutella ornithinolytica   Few Enterococcus faecalis   Few Streptococcus agalactiae (Group B) isolated   Group B streptococci are susceptible to ampicillin,   penicillin and cefazolin, but may be resistant to   erythromycin and clindamycin.   Recommendations for intrapartum prophylaxis for Group B   streptococci are penicillin or ampicillin. (09.16.21 @ 21:42)     Gram Stain:   No polymorphonuclear cells seen per low power field   No organisms seen per oil power field (09.22.21 @ 13:54)       Historical Values  Gram Stain:   No polymorphonuclear cells seen per low power field   No organisms seen per oil power field (09.22.21 @ 13:54)   Gram Stain:   No polymorphonuclear leukocytes seen per low power field   No organisms seen per oil power field (08.20.21 @ 03:59)        Podiatry Interval: Pt seen at bedside in ICU. Unable to attain a thorough history and physical from patient today.     Podiatry HPI: 76 year old male with a PMHx of DM, HTN, HLD, CAD to ED presents to the ED for a Right Foot s/p 5th foot partial ray resection and fillet toe flap. Patient states that he has sharp localized non-radiating pain to the Right foot 5th metatarsal. States that after his surgery, he did not see a podiatrist and he came into the ED because he saw drainage and was having pain. Denies any constitutional symptoms of N/V/C/F/SOB. Denies any other pedal complaints at this time. Denies calf pain today, bilaterally.      Medications aspirin  chewable 81 milliGRAM(s) Oral daily  atorvastatin 80 milliGRAM(s) Oral at bedtime  chlorhexidine 0.12% Liquid 15 milliLiter(s) Oral Mucosa every 12 hours  chlorhexidine 2% Cloths 1 Application(s) Topical <User Schedule>  cyanocobalamin 1000 MICROGram(s) Oral daily  ergocalciferol 06115 Unit(s) Oral <User Schedule>  fentaNYL   Infusion 0.5 MICROgram(s)/kG/Hr IV Continuous <Continuous>  ferrous    sulfate Liquid 300 milliGRAM(s) Enteral Tube daily  finasteride 5 milliGRAM(s) Oral daily  heparin  Infusion.  Unit(s)/Hr IV Continuous <Continuous>  insulin lispro (ADMELOG) corrective regimen sliding scale   SubCutaneous every 6 hours  meropenem  IVPB 1000 milliGRAM(s) IV Intermittent every 12 hours  ondansetron Injectable 4 milliGRAM(s) IV Push three times a day PRN  pantoprazole  Injectable 40 milliGRAM(s) IV Push at bedtime  propofol Infusion 30 MICROgram(s)/kG/Min IV Continuous <Continuous>  sodium chloride 0.9% lock flush 10 milliLiter(s) IV Push every 1 hour PRN  vancomycin  IVPB 1250 milliGRAM(s) IV Intermittent daily    FHFamily history of acute myocardial infarction (Father)    Family history of diabetes mellitus (Mother, Sibling)    Family history of hypertension (Mother, Sibling)    Family history of hyperlipidemia (Mother, Sibling)    ,   PMHHTN (hypertension)    HLD (hyperlipidemia)    DM (diabetes mellitus)    CAD (coronary artery disease)    Glaucoma, angle-closure    [place] (hard of hearing)       PSHNo significant past surgical history    S/P CABG (coronary artery bypass graft)    History of thyroid surgery        Labs                          8.5    5.50  )-----------( 163      ( 11 Oct 2021 05:44 )             27.2      10-11    147<H>  |  113<H>  |  28<H>  ----------------------------<  125<H>  3.7   |  27  |  1.62<H>    Ca    8.5      11 Oct 2021 05:43  Phos  3.4     10-11  Mg     2.4     10-11    TPro  6.4  /  Alb  2.2<L>  /  TBili  0.5  /  DBili  x   /  AST  197<H>  /  ALT  366<H>  /  AlkPhos  157<H>  10-11     Vital Signs Last 24 Hrs  T(C): 36 (11 Oct 2021 07:00), Max: 36.6 (10 Oct 2021 13:00)  T(F): 96.8 (11 Oct 2021 07:00), Max: 97.8 (10 Oct 2021 13:00)  HR: 74 (11 Oct 2021 07:00) (64 - 78)  BP: 114/50 (11 Oct 2021 07:00) (97/42 - 144/62)  BP(mean): 66 (11 Oct 2021 07:00) (56 - 82)  RR: 12 (11 Oct 2021 07:00) (10 - 19)  SpO2: 99% (11 Oct 2021 07:00) (99% - 100%)      Sedimentation Rate, Erythrocyte: 85 mm/Hr (10-08-21 @ 03:52)  Sedimentation Rate, Erythrocyte: 108 mm/Hr (10-02-21 @ 07:02)         C-Reactive Protein, Serum: 53 mg/L (10-08-21 @ 12:10)  C-Reactive Protein, Serum: 43 mg/L (10-02-21 @ 14:05)  C-Reactive Protein, Serum: 45 mg/L (09-16-21 @ 23:33)  C-Reactive Protein, Serum: 85 mg/L (08-22-21 @ 21:30)  C-Reactive Protein, Serum: 67 mg/L (08-15-21 @ 11:55)   WBC Count: 5.50 K/uL (10-11-21 @ 05:44)      PT/INR - ( 07 Oct 2021 04:07 )   PT: 16.6 sec;   INR: 1.42 ratio         CAPILLARY BLOOD GLUCOSE      POCT Blood Glucose.: 138 mg/dL (10.11.21 @ 05:39)   POCT Blood Glucose.: 121 mg/dL (08 Oct 2021 05:38)  POCT Blood Glucose.: 130 mg/dL (07 Oct 2021 22:01)  POCT Blood Glucose.: 149 mg/dL (07 Oct 2021 17:22)  POCT Blood Glucose.: 172 mg/dL (07 Oct 2021 11:12)      ROS: All others negative unless otherwise stated in the HPI        PHYSICAL EXAM Kept bandage intact   LE Focused:    Vasc:  DP/PT pulses were faintly palpable, bilateral. DP/PT pulses were monophasic on doppler, bilaterally. CFT<3 seconds to all digits.   Derm: Surgical site with graft in place to R 5th ray, granular wound bed through the graft with patches of necrotic islands noted, minimal drainage or discharge. minimal erythema and edema noted, eschar forming over the wound. Dorsal foot wound noted with tendon exposed, DTI noted to b/l heel  Neuro: Protective sensation grossly diminished, b/l  MSK: 5/5 muscle strength noted to the pedal compartments. 5th digit amputation R foot        Imaging:    3 views right foot. Prior 8/20/2021.    Status post resection of the fifth toe and distal fifth metatarsal unchanged in appearance. No focal bone lysis or unusual periosteal reaction to suggest osteomyelitis. No acute fracture or dislocation. The remainder study unchanged. No soft tissue gas.    IMPRESSION: No acute finding. No plain film evidence of osteomyelitis.      MRI R foot:     INTERPRETATION:  Clinical Information: Recent fifth metatarsal head resection now with fifth digit pain, swelling and foul discharge.    Comparison: Radiographs of the right foot from 9/16/2021 and MRI the right foot from 8/16/2021.    Technique:  MRI of the right midfoot and forefoot.  Intravenous Contrast: None.    Findings:    There is a resection at the mid aspect of the fifth metatarsal shaft. There is a lateral soft tissue wound beginning at the level of the amputation which extends distally. There is susceptibility artifact in the region of the amputation consistent with postoperative change. There is hyperintense T2 marrow signal throughout the remaining fifth metatarsal and within the adjacent fourth metatarsal head which is nonspecific and could be related to recent postoperative changes although osteomyelitis is suspected.    There is edema and mild atrophy within the plantar muscles of the foot. There is minimal spurring at the first metatarsophalangeal and hallux sesamoid articulations.    Impression:  Resection at the mid aspect of the fifth metatarsal. Lateral soft tissue wound with marrow signal abnormality throughout the fifth metatarsal and within the adjacent fourth metatarsal head which is nonspecific in the setting of recent surgery although osteomyelitis is suspected.        Culture Results:     Rare Aeromonas hydrophila/caviae   Few Klebsiella oxytoca/Raoutella ornithinolytica   Few Enterococcus faecalis   Few Streptococcus agalactiae (Group B) isolated   Group B streptococci are susceptible to ampicillin,   penicillin and cefazolin, but may be resistant to   erythromycin and clindamycin.   Recommendations for intrapartum prophylaxis for Group B   streptococci are penicillin or ampicillin. (09.16.21 @ 21:42)     Gram Stain:   No polymorphonuclear cells seen per low power field   No organisms seen per oil power field (09.22.21 @ 13:54)       Historical Values  Gram Stain:   No polymorphonuclear cells seen per low power field   No organisms seen per oil power field (09.22.21 @ 13:54)   Gram Stain:   No polymorphonuclear leukocytes seen per low power field   No organisms seen per oil power field (08.20.21 @ 03:59)

## 2021-10-11 NOTE — PROGRESS NOTE ADULT - ASSESSMENT
75yo Male with Hx of HTN, HLD, CAD, s/p CABG, severe AS, PAD, s/p R partial 5th ray amputation (8/19/21) presented to [facility] ED on 9/16/21 with R foot pain and foul drainage a/w diabetic foot ulcer  (wound Cx grew Enterococcus, Aeromonas, Klebsiella, Group B Strep 9/16), acute osteomyelitis,  s/p OR debridement, bone biopsy, wound vac on 9/22/21, OREN, and acute hypoxic respiratory failure due to pulmonary edema in setting of severe AS + aspirational PNA, as well as pleural effusions, required intubation on 10/7, became hemodynamically unstable, requiring pressor support since 10/7. He was consulted for acute, severe, hepatocellular liver injury, with intact function and cholestatic parameters, suspected likely due to ischemic injury, vs. DILI. The sudden acute rise and significant improvement (AST became half in a day), rather support the ischemic injury.     - C/w close monitoring of LFTs, including INR  - Avoid hepatotoxic medications when medically feasible (fluconazole and levofloxacin was d/c), if ALT elevation persists > 5-10x ULN, need to evaluate statin.  - C/w supportive measures per ICU  - C/w antibiotics per ID  - Can send Hep viral panel    - Cholelithiasis and distended GB on prior US 9/28, was no evidence of acute cholecystitis, was seen by GI. Later developed mostly hepatocellular elevation with normal cholestatic parameters. Now ALP slightly up-trending, although bilirubin remains normal.   - Consider repeat RUQ US if cholestatic parameters worsen.    - On AXR 10/6 concern joey for pneumoperitoneum -patient was too unstable for follow up imaging. Consider follow up imaging when feasible.    Thank you for the consult  Will continue to follow.   Discussed with ICU team.

## 2021-10-11 NOTE — PROGRESS NOTE ADULT - ASSESSMENT
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 s/p OR debridement today. Nephrology consulted for abrupt rise in SCr.     1. OREN- Recurrent ATN in the setting of infection. Lisinopril discontinued 9/22. ATN resolving with good urine o/p. Now off diuretics; likely recovery phase/ diuretic phase.    s/p CT chest with b/l mod to large pleural effusion R>L. s/p rt pigtail catheter.   Kidney/ Bladder US with large distended bladder s/p ibrahim placement on 9/23, then removed. s/p bladder scan 10/4 with 1L urine for which ibrahim was reinserted; however; renal function did not improve post ibrahim; less likely due to obstructive uropathy.  No peripheral eosinophilia- no signs of AIN. C3 & C4 wnl with neg ASO- no signs of PIGN. Strict I/Os. Avoid nephrotoxins/ NSAIDs/ RCA. Monitor BMP.    2. 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 Will defer secondary w/u as an outpt. Avoid nephrotoxins  3. HTN 2/2 CKD- BP acceptable.  Now off pressors. Plan as per ICU. Monitor BP  4. Acute osteomyelitis- s/p debridement 9/22. Pt now on Vanco and meropenem (renally dosed). Monitor Vanco trough. Pt with mild hypernatremia- change Meropenem to D5 base. Plan as per ID      [facility] NEPHROLOGY  Bryn Johnson M.D.  Domingo Booth D.O.  Zakia Rodriguez M.D.  Zonia Adams, MSN, ANP-C  [phone]    [address] [place] [address]

## 2021-10-11 NOTE — PROGRESS NOTE ADULT - ASSESSMENT
75 y/o male b/l Pleural effusion, right greater than left, s/p R pigtail placement 10/8.     -Continue pigtail to waterseal, monitor output   -F/u AM CXR  -Remainder of care per ICU team

## 2021-10-11 NOTE — PROGRESS NOTE ADULT - SUBJECTIVE AND OBJECTIVE BOX
[facility] NEPHROLOGY- PROGRESS NOTE    Patient is a 75yo Male with DM on insulin, HTN, HLD, CAD, s/p R partial 5th ray amputation 8/19/2021 p/w R foot pain and foul drainage a/w diabetic foot ulcer suspicious for osteomyelitis. s/p OR debridement today. Nephrology consulted for abrupt rise in SCr.   s/p ibrahim placement for distended bladder on 9/23 with ~400ml of urine o/p  9/27- pt with SOB; CXR with pulmonary edema- pt given Lasix 80mg IV x1. Concern for aspiration PNA  Course BP: s/p lasix 60mg IV on 10/1 & 10/2 and s/p Lasix 40mg IV x1 today 10/4. Bladder scan with 1L urine; s/p ibrahim reinserted  Transferred to ICU for acute hypoxic respiratory failure, s/p intubated on 10/7    Hospital Medications: Medications reviewed.  REVIEW OF SYSTEMS: Unable to obtain; sedated and intubated    VITALS:  T(F): 98 (10-11-21 @ 11:00), Max: 98 (10-11-21 @ 11:00)  HR: 87 (10-11-21 @ 15:00)  BP: 132/61 (10-11-21 @ 15:00)  RR: 20 (10-11-21 @ 15:00)  SpO2: 99% (10-11-21 @ 15:00)  Wt(kg): --    10-10 @ 07:01  -  10-11 @ 07:00  --------------------------------------------------------  IN: 1800.8 mL / OUT: 2180 mL / NET: -379.2 mL    10-11 @ 07:01  -  10-11 @ 15:24  --------------------------------------------------------  IN: 751 mL / OUT: 370 mL / NET: 381 mL      PHYSICAL EXAM:  Gen: Sedated  HEENT: +ETT  Cards: RRR, +S1/S2, +TRINIDAD  Resp: +mechanical BS, +rt pigtail catheter  GI: soft,   : +ibrahim  Extremities: no LE edema B/L  Derm: Rt foot wrapped with wound vac    LABS:  10-11    147<H>  |  113<H>  |  28<H>  ----------------------------<  125<H>  3.7   |  27  |  1.62<H>    Ca    8.5      11 Oct 2021 05:43  Phos  3.4     10-11  Mg     2.4     10-11    TPro  6.4  /  Alb  2.2<L>  /  TBili  0.5  /  DBili      /  AST  197<H>  /  ALT  366<H>  /  AlkPhos  157<H>  10-11    Creatinine Trend: 1.62 <--, 1.95 <--, 2.45 <--, 3.03 <--, 2.12 <--, 2.02 <--, 2.11 <--, 2.17 <--                        8.5    5.50  )-----------( 163      ( 11 Oct 2021 05:44 )             27.2     Urine Studies:    Creatinine, Random Urine: 123 mg/dL (10-05 @ 12:43)  Chloride, Random Urine: <10 mmol/L (10-05 @ 12:43)  Sodium, Random Urine: 44 mmol/L (10-05 @ 12:43)

## 2021-10-11 NOTE — PROGRESS NOTE ADULT - ASSESSMENT
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  As per daughter, patient was taking tylenol which did not help his pain prompting the patient to ask his daughter to take him to the hospital. ON admission, he has no fever or Leukocytosis. The Xray of Right foot shows no Osteomyelitis but MRI of Right foot shows Lateral soft tissue wound with marrow signal abnormality throughout the fifth metatarsal which is consistent of Osteomyelitis. He has seen by Podiatry and wound culture has sent, Zosyn and Vancomycin has started. The ID consult requested to assist with further evaluation and antibiotic management.     # Right Fifth metatarsal DFU with drainage and Osteomyelitis- wound cx grew Enterococcus, Aeromonas and Klebsiella - Zosyn is the ideal to cover All organisms but kidney function is worsening, hence change to Unasyn and Levaquin until kidney function is improved - The pathology shows Bone with acute osteomyelitis and necrotic periosteal tissue.   # OREN- s/p urinary retention -s/p ibrahim catheter - s/p discontinue ACEI  # RLL pneumonia- most likely Aspiration, S/p Vomiting - On Unasyn  # Large bowel distension  # Candiduria- 9/22/21  # Pulmonary edema with bilateral moderate to large pleural effusions- on CT chest, 10/5/21- s/p intubation 10/7- s/p Right sided chest tube placement by CT team, 10/8/21    would recommend:    1. Monitor  kidney function and adjust Abx doses accordingly   2. Please adjust Meropenem and Vancomycin doses based on Crcl  3. Management of Vent as per ICU protocol  4. Wound care as per Podiatry  5. Aspiration precaution    d/w ICU team     Attending Attestation:    Spent more than 45 minutes on total encounter, more than 50 % of the visit was spent counseling and/or coordinating care by the Attending physician.    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  As per daughter, patient was taking tylenol which did not help his pain prompting the patient to ask his daughter to take him to the hospital. ON admission, he has no fever or Leukocytosis. The Xray of Right foot shows no Osteomyelitis but MRI of Right foot shows Lateral soft tissue wound with marrow signal abnormality throughout the fifth metatarsal which is consistent of Osteomyelitis. He has seen by Podiatry and wound culture has sent, Zosyn and Vancomycin has started. The ID consult requested to assist with further evaluation and antibiotic management.     # Right Fifth metatarsal DFU with drainage and Osteomyelitis- wound cx grew Enterococcus, Aeromonas and Klebsiella - Zosyn is the ideal to cover All organisms but kidney function is worsening, hence change to Unasyn and Levaquin until kidney function is improved - The pathology shows Bone with acute osteomyelitis and necrotic periosteal tissue.   # OREN- s/p urinary retention -s/p ibrahim catheter - s/p discontinue ACEI  # RLL pneumonia- most likely Aspiration, S/p Vomiting - On Unasyn  # Large bowel distension  # Candiduria- 9/22/21  # Pulmonary edema with bilateral moderate to large pleural effusions- on CT chest, 10/5/21- s/p intubation 10/7- s/p Right sided chest tube placement by CT team, 10/8/21    would recommend:  1. Care of CT as per CT surgery team  2. Monitor  kidney function and adjust Abx doses accordingly   3. Continue adjusted doses of  Meropenem and Vancomycin based on Crcl  4. Management of Vent as per ICU protocol  5. Wound care as per Podiatry  6. Aspiration precaution    d/w ICU team     Attending Attestation:    Spent more than 35 minutes on total encounter, more than 50 % of the visit was spent counseling and/or coordinating care by the Attending physician.

## 2021-10-11 NOTE — PROGRESS NOTE ADULT - ASSESSMENT
A:   s/p R 5th ray resection - 8/19  s/p R 5th wound debridement, graft application, bone biopsy and wound vac application 9/22       P:  Patient evaluated, chart reviewed  Pt seen in ICU for worsening hypoxia and dyspnea   Pt is intubated  Dressing removed   Wound vac changed today (10/11)  Applied adaptic and santyl to the right dorsal wound - tendon exposed   Continue abx per ID recommendation  Recommend elevation of b/l legs   Continue local wound care   Continue offloading lower extremities in CAIR boots   Will change wound vac every other day   Podiatry to follow while in house  Discussed and evaluated at bedside with attending Dr. Vazquez A:   s/p R 5th ray resection - 8/19  s/p R 5th wound debridement, graft application, bone biopsy and wound vac application 9/22       P:  Patient evaluated, chart reviewed  Pt seen in ICU for worsening hypoxia and dyspnea   Pt is intubated  Dressing removed   Wound vac changed today (10/11)  Staples were removed from the graft site using sterile staple remover   Applied adaptic and santyl to the right dorsal wound - tendon exposed   Continue abx per ID recommendation  Recommend elevation of b/l legs   Continue local wound care   Continue offloading lower extremities in CAIR boots   Will change wound vac every other day   Podiatry to follow while in house  Discussed and evaluated at bedside with attending Dr. Vazquez

## 2021-10-11 NOTE — PROGRESS NOTE ADULT - SUBJECTIVE AND OBJECTIVE BOX
INTERVAL HPI/OVERNIGHT EVENTS:     PRESSORS: [ ] YES [ ] NO  WHICH:    ANTIBIOTICS:                  DATE STARTED:  ANTIBIOTICS:                  DATE STARTED:  ANTIBIOTICS:                  DATE STARTED:    Antimicrobial:  meropenem  IVPB 1000 milliGRAM(s) IV Intermittent every 12 hours  vancomycin  IVPB 1250 milliGRAM(s) IV Intermittent daily    Cardiovascular:    Pulmonary:    Hematalogic:  aspirin  chewable 81 milliGRAM(s) Oral daily  heparin  Infusion.  Unit(s)/Hr IV Continuous <Continuous>    Other:  atorvastatin 80 milliGRAM(s) Oral at bedtime  chlorhexidine 0.12% Liquid 15 milliLiter(s) Oral Mucosa every 12 hours  chlorhexidine 2% Cloths 1 Application(s) Topical <User Schedule>  cyanocobalamin 1000 MICROGram(s) Oral daily  ergocalciferol 08638 Unit(s) Oral <User Schedule>  fentaNYL   Infusion 0.5 MICROgram(s)/kG/Hr IV Continuous <Continuous>  ferrous    sulfate Liquid 300 milliGRAM(s) Enteral Tube daily  finasteride 5 milliGRAM(s) Oral daily  insulin lispro (ADMELOG) corrective regimen sliding scale   SubCutaneous every 6 hours  ondansetron Injectable 4 milliGRAM(s) IV Push three times a day PRN  pantoprazole  Injectable 40 milliGRAM(s) IV Push at bedtime  propofol Infusion 30 MICROgram(s)/kG/Min IV Continuous <Continuous>  sodium chloride 0.9% lock flush 10 milliLiter(s) IV Push every 1 hour PRN      Drug Dosing Weight  Height (cm): 170.2 (17 Sep 2021 21:58)  Weight (kg): 86.9 (05 Oct 2021 21:45)  BMI (kg/m2): 30 (05 Oct 2021 21:45)  BSA (m2): 1.99 (05 Oct 2021 21:45)    CENTRAL LINE: [ ] YES [ ] NO  LOCATION:   DATE INSERTED:  REMOVE: [ ] YES [ ] NO  EXPLAIN:    CASTAÑEDA: [ ] YES [ ] NO    DATE INSERTED:  REMOVE:  [ ] YES [ ] NO  EXPLAIN:    A-LINE:  [ ] YES [ ] NO  LOCATION:   DATE INSERTED:  REMOVE:  [ ] YES [ ] NO  EXPLAIN:    PMH/Social Hx/Fam Hx -reviewed admission note, no change since admission  PAST MEDICAL & SURGICAL HISTORY:  HTN (hypertension)    HLD (hyperlipidemia)    DM (diabetes mellitus)    CAD (coronary artery disease)    Glaucoma, angle-closure    [place] (hard of hearing)    S/P CABG (coronary artery bypass graft)    History of thyroid surgery      Heart faliure: acute [ ] chronic [ ] acute or chronic [ ] diastolic [ ] systolic [ ] combied systolic and diastolic[ ]  OREN: ATN[ ] renal medullary necrosis [ ] CKD I [ ]CKDII [ ]CKD III [ ]CKD IV [ ]CKD V [ ]Other pathological lesions [ ]  Abdominal Nutrition Status: malnutrition [ ] cachexia [ ] morbid obesity/BMI=40 [ ] Supplement ordered [___________]     T(C): 36.4 (10-11-21 @ 04:00), Max: 36.6 (10-10-21 @ 13:00)  HR: 76 (10-11-21 @ 06:00)  BP: 137/56 (10-11-21 @ 06:00)  BP(mean): 77 (10-11-21 @ 06:00)  ABP: --  ABP(mean): --  RR: 18 (10-11-21 @ 06:00)  SpO2: 100% (10-11-21 @ 06:00)  Wt(kg): --    ABG - ( 11 Oct 2021 04:23 )  pH, Arterial: 7.42  pH, Blood: x     /  pCO2: 42    /  pO2: 176   / HCO3: 27    / Base Excess: 2.4   /  SaO2: 100                   10-10 @ 07:01  -  10-11 @ 07:00  --------------------------------------------------------  IN: 1800.8 mL / OUT: 2180 mL / NET: -379.2 mL        Mode: AC/ CMV (Assist Control/ Continuous Mandatory Ventilation)  RR (machine): 14  TV (machine): 450  FiO2: 40  PEEP: 5  ITime: 1  MAP: 8  PIP: 19      PHYSICAL EXAM:    GENERAL: [ ]NAD, [ ]well-groomed, [ ]well-developed  HEAD:  [ ]Atraumatic, [ ]Normocephalic  EYES: [ ]EOMI, [ ]PERRLA, [ ]conjunctiva and sclera clear  ENMT: [ ]No tonsillar erythema, exudates, or enlargement; [ ]Moist mucous membranes, [ ]Good dentition, [ ]No lesions  NECK: [ ]Supple, normal appearance, [ ]No JVD; [ ]Normal thyroid; [ ]Trachea midline  NERVOUS SYSTEM:  [ ]Alert & Oriented X3, [ ]Good concentration; [ ]Motor Strength 5/5 B/L upper and lower extremities; [ ]DTRs 2+ intact and symmetric  CHEST/LUNG: [ ]No chest deformity; [ ]Normal percussion bilaterally; [ ]No rales, rhonchi, wheezing; [ ]Crackles at bases  HEART: [ ]Regular rate and rhythm; [ ]No murmurs, rubs, or gallops  ABDOMEN: [ ]Soft, Nontender, Nondistended; [ ]Bowel sounds present  EXTREMITIES:  [ ]2+ Peripheral Pulses, [ ]No clubbing, cyanosis, or edema [ ]Bilat lower extremity edema  LYMPH: [ ]No lymphadenopathy noted  SKIN: [ ]No rashes or lesions; [ ]Good capillary refill      LABS:  CBC Full  -  ( 11 Oct 2021 05:44 )  WBC Count : 5.50 K/uL  RBC Count : 2.87 M/uL  Hemoglobin : 8.5 g/dL  Hematocrit : 27.2 %  Platelet Count - Automated : 163 K/uL  Mean Cell Volume : 94.8 fl  Mean Cell Hemoglobin : 29.6 pg  Mean Cell Hemoglobin Concentration : 31.3 gm/dL  Auto Neutrophil # : 4.05 K/uL  Auto Lymphocyte # : 0.87 K/uL  Auto Monocyte # : 0.29 K/uL  Auto Eosinophil # : 0.24 K/uL  Auto Basophil # : 0.02 K/uL  Auto Neutrophil % : 73.6 %  Auto Lymphocyte % : 15.8 %  Auto Monocyte % : 5.3 %  Auto Eosinophil % : 4.4 %  Auto Basophil % : 0.4 %    10-11    147<H>  |  113<H>  |  28<H>  ----------------------------<  125<H>  3.7   |  27  |  1.62<H>    Ca    8.5      11 Oct 2021 05:43  Phos  3.4     10-11  Mg     2.4     10-11    TPro  6.4  /  Alb  2.2<L>  /  TBili  0.5  /  DBili  x   /  AST  197<H>  /  ALT  366<H>  /  AlkPhos  157<H>  10-11    PT/INR - ( 11 Oct 2021 05:43 )   PT: 14.7 sec;   INR: 1.25 ratio         PTT - ( 11 Oct 2021 05:43 )  PTT:98.5 sec    Culture Results:   No growth to date. (10-08 @ 18:34)      RADIOLOGY & ADDITIONAL STUDIES REVIEWED:      [ ]GOALS OF CARE DISCUSSION WITH PATIENT/FAMILY/PROXY:    CRITICAL CARE TIME SPENT: 35 minutes INTERVAL HPI/HPI:  Patient is a 76 year old male  with PMH of poorly controlled IDDM Hgb A1C 11.4, HTN, HLD, stage III CKD, CAD s/p CABG and recent partial amputation to right 5th digit (8/19/21) who presented to ED with c/o right foot pain associated with discharge and foul odor. No post op follow up. Came to the ED due to sub obtimal pain control. No fever, chest, pain, palpitations, nausea, vomiting, diarrhea (17 Sep 2021 01:11)    INTERVAL HPI/ HOSPITAL COURSE   MRI confirms suspected osteomyelitis of the R foot - patient followed by podiatry and ID; and underwent debridement and wound VAC placement on R foot. Surgical pathology resulted OM, and wound culture resulting Enterococcus Aeromonal and Klebsiella- treated initially with Zosyn. He developed OREN likely mixed 2/2 to a mixed etiology with prerenal from active infection/ pulmonary edema, intrarenal due to toxicity from IV antibiotics and post renal from urinary retention, Nephrology consulted for optimization of kidney function. IV antibiotic regimen switched to Unasyn and Levaquin, sensitive for cultured organisms; requiring 6 weeks of IV antibiotics.     Patient then developed Pulmonary Edema and was treated with IV lasix. Cardiology consulted and recommending SABIHA with L/R heart cath once infection clears and medically stable. Hospital course further complicated by abdominal distention and constipation for which he received lactulose and had vomiting. AXR shows distended loops of bowel for which NG tube was placed which the patient removed overnight 9/26 - re-attempts to replace NG tube were unsuccessful as patient was non-cooperative.     The patient went on to develop worsening respiratory distress sats 80s% SPO2 on 10L NC : CXR showed possible RLL PNA. likely aspiration.CT abdomen deferred for now as patient is unstable and will not tolerate lying flat. CT chest done showing R>L pulmonary edema vs Pneumonia;. Pulm (DR. Xavier) consulted for possible thoracentesis, and recommended to start albumin + lasix. ICU was consulted for worsening respiratory status. Surgery consulted for pigtail placement, however, inadequate visualization of pocket.  Patient denies any current trouble breathing, chest pain, or cough.      OVERNIGHT EVENTS: No acute events. Patient is currently intubated and sedated with the current Vent settings 450/5/14/40%.     PRESSORS: [ ] YES [X ] NO  WHICH:    ANTIBIOTICS:                  DATE STARTED:  ANTIBIOTICS:                  DATE STARTED:  ANTIBIOTICS:                  DATE STARTED:    Antimicrobial:  meropenem  IVPB 1000 milliGRAM(s) IV Intermittent every 12 hours  vancomycin  IVPB 1250 milliGRAM(s) IV Intermittent daily    Cardiovascular:    Pulmonary:    Hematalogic:  aspirin  chewable 81 milliGRAM(s) Oral daily  heparin  Infusion.  Unit(s)/Hr IV Continuous <Continuous>    Other:  atorvastatin 80 milliGRAM(s) Oral at bedtime  chlorhexidine 0.12% Liquid 15 milliLiter(s) Oral Mucosa every 12 hours  chlorhexidine 2% Cloths 1 Application(s) Topical <User Schedule>  cyanocobalamin 1000 MICROGram(s) Oral daily  ergocalciferol 60899 Unit(s) Oral <User Schedule>  fentaNYL   Infusion 0.5 MICROgram(s)/kG/Hr IV Continuous <Continuous>  ferrous    sulfate Liquid 300 milliGRAM(s) Enteral Tube daily  finasteride 5 milliGRAM(s) Oral daily  insulin lispro (ADMELOG) corrective regimen sliding scale   SubCutaneous every 6 hours  ondansetron Injectable 4 milliGRAM(s) IV Push three times a day PRN  pantoprazole  Injectable 40 milliGRAM(s) IV Push at bedtime  propofol Infusion 30 MICROgram(s)/kG/Min IV Continuous <Continuous>  sodium chloride 0.9% lock flush 10 milliLiter(s) IV Push every 1 hour PRN      Drug Dosing Weight  Height (cm): 170.2 (17 Sep 2021 21:58)  Weight (kg): 86.9 (05 Oct 2021 21:45)  BMI (kg/m2): 30 (05 Oct 2021 21:45)  BSA (m2): 1.99 (05 Oct 2021 21:45)    CENTRAL LINE: [X ] YES [ ] NO  LOCATION:   DATE INSERTED:  REMOVE: [ ] YES [ ] NO  EXPLAIN:    CASTAÑEDA: [X] YES [ ] NO    DATE INSERTED:  REMOVE:  [ ] YES [ ] NO  EXPLAIN:    A-LINE:  [ ] YES [ ] NO  LOCATION:   DATE INSERTED:  REMOVE:  [ ] YES [ ] NO  EXPLAIN:    PMH/Social Hx/Fam Hx -reviewed admission note, no change since admission  PAST MEDICAL & SURGICAL HISTORY:  HTN (hypertension)    HLD (hyperlipidemia)    DM (diabetes mellitus)    CAD (coronary artery disease)    Glaucoma, angle-closure    [place] (hard of hearing)    S/P CABG (coronary artery bypass graft)    History of thyroid surgery    T(C): 36.4 (10-11-21 @ 04:00), Max: 36.6 (10-10-21 @ 13:00)  HR: 76 (10-11-21 @ 06:00)  BP: 137/56 (10-11-21 @ 06:00)  BP(mean): 77 (10-11-21 @ 06:00)  ABP: --  ABP(mean): --  RR: 18 (10-11-21 @ 06:00)  SpO2: 100% (10-11-21 @ 06:00)  Wt(kg): --    ABG - ( 11 Oct 2021 04:23 )  pH, Arterial: 7.42  pH, Blood: x     /  pCO2: 42    /  pO2: 176   / HCO3: 27    / Base Excess: 2.4   /  SaO2: 100                   10-10 @ 07:01  -  10-11 @ 07:00  --------------------------------------------------------  IN: 1800.8 mL / OUT: 2180 mL / NET: -379.2 mL        Mode: AC/ CMV (Assist Control/ Continuous Mandatory Ventilation)  RR (machine): 14  TV (machine): 450  FiO2: 40  PEEP: 5  ITime: 1  MAP: 8  PIP: 19      PHYSICAL EXAM:  GENERAL: sedated and intubated.   HEAD: Nomocephalic, Atraumatic  EYES: unable to assess   NECK: Supple, No JVD; Normal thyroid; Trachea midline  NERVOUS SYSTEM:  sedated   CHEST/LUNG: No rales, rhonchi, wheezing   HEART: Regular rate and rhythm; No murmurs,   ABDOMEN: Soft, Nontender, Nondistended; Bowel sounds present  EXTREMITIES:  2+ Peripheral Pulses, No clubbing, cyanosis, or edema  SKIN: No lesions      LABS:  CBC Full  -  ( 11 Oct 2021 05:44 )  WBC Count : 5.50 K/uL  RBC Count : 2.87 M/uL  Hemoglobin : 8.5 g/dL  Hematocrit : 27.2 %  Platelet Count - Automated : 163 K/uL  Mean Cell Volume : 94.8 fl  Mean Cell Hemoglobin : 29.6 pg  Mean Cell Hemoglobin Concentration : 31.3 gm/dL  Auto Neutrophil # : 4.05 K/uL  Auto Lymphocyte # : 0.87 K/uL  Auto Monocyte # : 0.29 K/uL  Auto Eosinophil # : 0.24 K/uL  Auto Basophil # : 0.02 K/uL  Auto Neutrophil % : 73.6 %  Auto Lymphocyte % : 15.8 %  Auto Monocyte % : 5.3 %  Auto Eosinophil % : 4.4 %  Auto Basophil % : 0.4 %    10-11    147<H>  |  113<H>  |  28<H>  ----------------------------<  125<H>  3.7   |  27  |  1.62<H>    Ca    8.5      11 Oct 2021 05:43  Phos  3.4     10-11  Mg     2.4     10-11    TPro  6.4  /  Alb  2.2<L>  /  TBili  0.5  /  DBili  x   /  AST  197<H>  /  ALT  366<H>  /  AlkPhos  157<H>  10-11    PT/INR - ( 11 Oct 2021 05:43 )   PT: 14.7 sec;   INR: 1.25 ratio         PTT - ( 11 Oct 2021 05:43 )  PTT:98.5 sec    Culture Results:   No growth to date. (10-08 @ 18:34)      RADIOLOGY & ADDITIONAL STUDIES REVIEWED:      [ ]GOALS OF CARE DISCUSSION WITH PATIENT/FAMILY/PROXY:    CRITICAL CARE TIME SPENT: 35 minutes

## 2021-10-11 NOTE — PROGRESS NOTE ADULT - SUBJECTIVE AND OBJECTIVE BOX
INTERVAL HPI/OVERNIGHT EVENTS:  Pt seen and examined in ICU  Remains intubated      MEDICATIONS  (STANDING):  aspirin  chewable 81 milliGRAM(s) Oral daily  atorvastatin 80 milliGRAM(s) Oral at bedtime  chlorhexidine 0.12% Liquid 15 milliLiter(s) Oral Mucosa every 12 hours  chlorhexidine 2% Cloths 1 Application(s) Topical <User Schedule>  cyanocobalamin 1000 MICROGram(s) Oral daily  ergocalciferol 60125 Unit(s) Oral <User Schedule>  fentaNYL   Infusion 0.5 MICROgram(s)/kG/Hr (4.35 mL/Hr) IV Continuous <Continuous>  ferrous    sulfate Liquid 300 milliGRAM(s) Enteral Tube daily  finasteride 5 milliGRAM(s) Oral daily  heparin  Infusion.  Unit(s)/Hr (16 mL/Hr) IV Continuous <Continuous>  insulin lispro (ADMELOG) corrective regimen sliding scale   SubCutaneous every 6 hours  meropenem  IVPB 1000 milliGRAM(s) IV Intermittent every 12 hours  metoprolol tartrate 12.5 milliGRAM(s) Oral two times a day  pantoprazole  Injectable 40 milliGRAM(s) IV Push at bedtime  propofol Infusion 30 MICROgram(s)/kG/Min (15.6 mL/Hr) IV Continuous <Continuous>  vancomycin  IVPB 1250 milliGRAM(s) IV Intermittent daily    MEDICATIONS  (PRN):  ondansetron Injectable 4 milliGRAM(s) IV Push three times a day PRN Nausea and/or Vomiting  sodium chloride 0.9% lock flush 10 milliLiter(s) IV Push every 1 hour PRN Pre/post blood products, medications, blood draw, and to maintain line patency      Vital Signs Last 24 Hrs  T(C): 36 (11 Oct 2021 07:00), Max: 36.6 (10 Oct 2021 13:00)  T(F): 96.8 (11 Oct 2021 07:00), Max: 97.8 (10 Oct 2021 13:00)  HR: 94 (11 Oct 2021 09:00) (64 - 94)  BP: 140/92 (11 Oct 2021 09:00) (97/42 - 144/62)  BP(mean): 95 (11 Oct 2021 09:00) (56 - 95)  RR: 25 (11 Oct 2021 09:00) (10 - 25)  SpO2: 90% (11 Oct 2021 09:00) (90% - 100%)    Physical:  General: intubated, sedated  chest: symmetrical chest rise, right sided pigtail catheter in place, on waterseal. 80cc ss of output overnight no airleak, no crepitus.     I&O's Detail    10 Oct 2021 07:01  -  11 Oct 2021 07:00  --------------------------------------------------------  IN:    Enteral Tube Flush: 100 mL    Enteral Tube Flush: 960 mL    FentaNYL: 103.2 mL    Heparin Infusion: 288 mL    IV PiggyBack: 100 mL    Propofol: 249.6 mL  Total IN: 1800.8 mL    OUT:    Chest Tube (mL): 80 mL    Indwelling Catheter - Urethral (mL): 1000 mL    Phenylephrine: 0 mL    Stool (mL): 1100 mL  Total OUT: 2180 mL    Total NET: -379.2 mL      11 Oct 2021 07:01  -  11 Oct 2021 09:12  --------------------------------------------------------  IN:    FentaNYL: 4.3 mL    Glucerna 1.5: 80 mL    Heparin Infusion: 12 mL    Propofol: 10.4 mL  Total IN: 106.7 mL    OUT:    Indwelling Catheter - Urethral (mL): 35 mL  Total OUT: 35 mL    Total NET: 71.7 mL    LABS:                        8.5    5.50  )-----------( 163      ( 11 Oct 2021 05:44 )             27.2             10-11    147<H>  |  113<H>  |  28<H>  ----------------------------<  125<H>  3.7   |  27  |  1.62<H>    Ca    8.5      11 Oct 2021 05:43  Phos  3.4     10-11  Mg     2.4     10-11    TPro  6.4  /  Alb  2.2<L>  /  TBili  0.5  /  DBili  x   /  AST  197<H>  /  ALT  366<H>  /  AlkPhos  157<H>  10-11

## 2021-10-11 NOTE — PROGRESS NOTE ADULT - SUBJECTIVE AND OBJECTIVE BOX
C A R D I O L O G Y  **********************************    DATE OF SERVICE: 10-11-21    Patient is on ventilator support, unable to obtain review of symptoms.      aspirin  chewable 81 milliGRAM(s) Oral daily  atorvastatin 80 milliGRAM(s) Oral at bedtime  chlorhexidine 0.12% Liquid 15 milliLiter(s) Oral Mucosa every 12 hours  chlorhexidine 2% Cloths 1 Application(s) Topical <User Schedule>  cyanocobalamin 1000 MICROGram(s) Oral daily  ergocalciferol 74521 Unit(s) Oral <User Schedule>  ferrous    sulfate Liquid 300 milliGRAM(s) Enteral Tube daily  finasteride 5 milliGRAM(s) Oral daily  heparin  Infusion.  Unit(s)/Hr IV Continuous <Continuous>  insulin lispro (ADMELOG) corrective regimen sliding scale   SubCutaneous every 6 hours  meropenem  IVPB 1000 milliGRAM(s) IV Intermittent every 12 hours  metoprolol tartrate 12.5 milliGRAM(s) Oral two times a day  ondansetron Injectable 4 milliGRAM(s) IV Push three times a day PRN  pantoprazole  Injectable 40 milliGRAM(s) IV Push at bedtime  propofol Infusion 30 MICROgram(s)/kG/Min IV Continuous <Continuous>  sodium chloride 0.9% lock flush 10 milliLiter(s) IV Push every 1 hour PRN  vancomycin  IVPB 1250 milliGRAM(s) IV Intermittent daily                            8.5    5.50  )-----------( 163      ( 11 Oct 2021 05:44 )             27.2       Hemoglobin: 8.5 g/dL (10-11 @ 05:44)  Hemoglobin: 8.6 g/dL (10-10 @ 06:50)  Hemoglobin: 7.3 g/dL (10-10 @ 03:54)  Hemoglobin: 8.4 g/dL (10-09 @ 18:09)  Hemoglobin: 8.5 g/dL (10-09 @ 12:22)      10-11    147<H>  |  113<H>  |  28<H>  ----------------------------<  125<H>  3.7   |  27  |  1.62<H>    Ca    8.5      11 Oct 2021 05:43  Phos  3.4     10-11  Mg     2.4     10-11    TPro  6.4  /  Alb  2.2<L>  /  TBili  0.5  /  DBili  x   /  AST  197<H>  /  ALT  366<H>  /  AlkPhos  157<H>  10-11    Creatinine Trend: 1.62<--, 1.95<--, 2.45<--, 3.03<--, 2.12<--, 2.02<--    COAGS: PT/INR - ( 11 Oct 2021 05:43 )   PT: 14.7 sec;   INR: 1.25 ratio         PTT - ( 11 Oct 2021 05:43 )  PTT:98.5 sec    CARDIAC MARKERS ( 09 Oct 2021 12:22 )  0.222 ng/mL / x     / x     / x     / x      CARDIAC MARKERS ( 09 Oct 2021 04:06 )  0.297 ng/mL / x     / 281 U/L / x     / <1.0 ng/mL        T(C): 36.7 (10-11-21 @ 11:00), Max: 36.7 (10-11-21 @ 11:00)  HR: 87 (10-11-21 @ 12:05) (64 - 94)  BP: 132/52 (10-11-21 @ 12:00) (97/42 - 144/62)  RR: 14 (10-11-21 @ 12:00) (10 - 25)  SpO2: 100% (10-11-21 @ 12:05) (90% - 100%)  Wt(kg): --    I&O's Summary    10 Oct 2021 07:01  -  11 Oct 2021 07:00  --------------------------------------------------------  IN: 1800.8 mL / OUT: 2180 mL / NET: -379.2 mL    11 Oct 2021 07:01  -  11 Oct 2021 12:36  --------------------------------------------------------  IN: 407.4 mL / OUT: 210 mL / NET: 197.4 mL      HEENT:  (-)icterus (-)pallor  CV: N S1 S2 1/6 TRINIDAD (+)2 Pulses B/l  Resp:  Coarse sounds B/L, normal effort  GI: (+) BS Soft, NT, ND  Lymph:  (-)Edema, (-)obvious lymphadenopathy  Skin: Warm to touch, Normal turgor  Psych: Unable to assess mood and affect    Tele: SR      ASSESSMENT/PLAN: 	76y  Male PMH DM on insulin, HTN, HLD, normal lV fx moderate to severe AS 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    - monitor crt, nephrology f/u appreciated  - Abx per primary team  - Echo noted, Severe AS, EF 40-45%   - He will need a SABIHA and R+L heart cath when he recovers and has no active infection  - Cont medical management of CAD  - Pt. with new onset PAF - recommend ac if no contraindications for cva prevention - started on hep gtt - monitor h/h  - follow up thoracic surgery regarding pig tail  - vent support per AYAAN Wilkins MD, [facility]  BEEPER [phone]

## 2021-10-11 NOTE — PROGRESS NOTE ADULT - ASSESSMENT
Patient is a 77yo male with hx of DM, HTN, HLD, CKD (Stage 3), CAD, s/p CABG and Right partial 5th foot amp (8/21) who intially presented to the ED of right foot pain, found to have osteomyelitis. ICU was consulted for AHRF 2/2 to Asp PNA requiring BIPAP, patient intubated on 10/7.     Plan:  Neuro:    - On propofoL 10.4 mcg will stop in order to attempt SAT   - will reduce fentanyl drip   - Versed, precedex as needed  -Pupils are not equal and reactive, needs a CT head    CVS:  # Severe Aortic Stenosis   - Echo 8/15  confirmed EF 55-60%, Severe LVH, g1 diastolic dysfunction, and severe AS   -Murmurs on exam, systolic  -Cardiology on board SABIHA once stable    # combined systolic and diastolic heart failure  - CT Chest with pulm edema bilaterally; moderate to large pleural effusions; atelectasis vs PNA   - Echo 8/15  confirmed EF 55-60%, Severe LVH, g1 diastolic dysfunction, and severe AS   - new 10/8/2021  EF 40%  - BNP 19K with worsening overload as above; 9K today pt with poor cardiac reserve worsened by acute infection   - CXR  this admission showing improved R sided pleural effusion with diuresis  - Avoid drugs which increase afterload         #CAD   - S/p CABG, on ASA   - Continue Toprol 100 Qd, Procardia 60XL once stable  - Positive trop 1.46->1.550; likely due to increased demand and renal failure.  -Now down trended 1.05  - Echo 10/8/2021 EF 40%  - Will need a SABIHA and R+L heart cath once medically optimized  - C/w ASA, statin  - Cardiology Dr. Wilkins     #hypotension  -resolved,   - will continue to monitor not requiring pressors     #Afib   currently resolved,  The EKG and tele record A-fib with RVR, and left bundle   Afib likely new, old EKGs say sinus rhythm with nonspecific IVCD, LAPB  Hx of CABG  patient in Heparin drip   will start Lopressor 25mg BID with hold parameters       Pulm:  - Acute Hypoxic/ Hypercapnic Resp Failure 2/2  1. RLL PNA vs  2. Right pleural effusions vs  3. CHF    - Patient was on Pendant, saturating 90% on 10L pendant, but having increased WOB, tachypnea   - Possible left lower lobe pneumonia and bilateral pleural effusions on CT Chest   - ABG was showing 7.49/33/52/25-> respiratory alkalosis with hypoxic   -ICU  Bipap 10/5 for hypoxia and work of breathing,  -Got intubated 10/7/2021  -pt also had CABG hx and BNP 19k - 9K this am=dmission  - Per surgery, Tello catheter placed without issue and drained 1000cc immediately and clamped.  - Sent fluid for tests/ cultures LDH, cytology..  -c/w with abx ( Vanc and Meropenem )   - avoid fluid overloading   -Vent set 14/450/5/40 sats 100 plan for SBT today     ID:  - Empiric Aspiration PNA coverage, R foot osteomyelitis, and Funguria   - Blood Cultures -ve till date, Bone and wound culture: showing Aeromonas, GBS, Klebsiella, Enterococcus   -Cultures from 9/22/2021, tissue and blood 9/17 negative  - UCX showing yeast - one source  - On Unasyn, Levaquin, Diflucan; renally dosed - all >12 days  -DC diflucan as it is only one source, also given today's LFTs  -DC Levaquin  -Continue on vanc and faustino with renal dosing   -f/u vanc trough 10/12     Nephro:  - Ibrahim placed for possible obstruction   - OREN on CKD stage III: ATN was resolving, however renal fxn now worsened.   - Kidney/ Bladder US with large distended bladder s/p ibrahim placement on 9/23, then removed.   - S/p bladder scan 10/4 with 1L urine for which Ibrahim was reinserted  - FeUrea: 20.5% 9/23:   -Cr 2.02 down trended then 2.12, 3.02 BUN/ cr ratio 12 today most likely ATN from Hypotension  - Less likely due to obstructive uropathy.  No peripheral eosinophilia- no signs of AIN.   - Avoid nephrotoxins/ NSAIDs/ RCA. Monitor BMP.  -UOP now -ve   -Nephrology on board Dr. Rodriguez     GI:  -RLL likely aspiration  - Had an Aspiration event after NG tube placed and subsequently removed by pt  - Aspiration precautions, speech and swallow recs, based on cineesophagogram  - Cholelithiasis noted on RUQ US   - Patient is having 2 lose stools daily  -however patient is now intubated   - NPO with tube feed   -dc senna    #shock liver  most likely due to ischemic liver injury   improving currently AST, ALT in 200's and 300's   maintain BP  f/u  RUQ US     Heme:  - no indication for transfusion currently  - admitted with Hb 12.7 in August; small downtrend in the setting of CKD   - No active bleeding, normocytic   - Fe panel shows deficiency; will hold repletion during acute infection  - Likely component of ACD, CKD  - Tranfuse if Hb<7 or if worsening tachy/HF sx     Endo:  - target CBG < 180 controlled NPO today  -On tube feeds  - C/w Sliding Scale, accuchecks ACHS     Dispo:  - monitor in the ICU   - PT FULL CODE - confirmed w/ Daughter Mrs. Bell Pj

## 2021-10-11 NOTE — PROGRESS NOTE ADULT - SUBJECTIVE AND OBJECTIVE BOX
`Patient is a 76y old  Male who presents with a chief complaint of R Foot Pain (11 Oct 2021 19:24)    PATIENT IS SEEN AND EXAMINED IN MEDICAL FLOOR.  KEENANT [    ]    MADDY [   ]      GT [   ]    ALLERGIES:  No Known Allergies      Daily     Daily Weight in k.6 (11 Oct 2021 07:00)    VITALS:    Vital Signs Last 24 Hrs  T(C): 36.9 (11 Oct 2021 19:09), Max: 36.9 (11 Oct 2021 19:09)  T(F): 98.5 (11 Oct 2021 19:09), Max: 98.5 (11 Oct 2021 19:09)  HR: 76 (11 Oct 2021 19:00) (67 - 103)  BP: 106/49 (11 Oct 2021 19:00) (105/54 - 148/72)  BP(mean): 61 (11 Oct 2021 19:00) (61 - 95)  RR: 18 (11 Oct 2021 19:00) (11 - 25)  SpO2: 99% (11 Oct 2021 19:00) (90% - 100%)    LABS:    CBC Full  -  ( 11 Oct 2021 05:44 )  WBC Count : 5.50 K/uL  RBC Count : 2.87 M/uL  Hemoglobin : 8.5 g/dL  Hematocrit : 27.2 %  Platelet Count - Automated : 163 K/uL  Mean Cell Volume : 94.8 fl  Mean Cell Hemoglobin : 29.6 pg  Mean Cell Hemoglobin Concentration : 31.3 gm/dL  Auto Neutrophil # : 4.05 K/uL  Auto Lymphocyte # : 0.87 K/uL  Auto Monocyte # : 0.29 K/uL  Auto Eosinophil # : 0.24 K/uL  Auto Basophil # : 0.02 K/uL  Auto Neutrophil % : 73.6 %  Auto Lymphocyte % : 15.8 %  Auto Monocyte % : 5.3 %  Auto Eosinophil % : 4.4 %  Auto Basophil % : 0.4 %    PT/INR - ( 11 Oct 2021 05:43 )   PT: 14.7 sec;   INR: 1.25 ratio         PTT - ( 11 Oct 2021 05:43 )  PTT:98.5 sec  10-11    147<H>  |  113<H>  |  28<H>  ----------------------------<  125<H>  3.7   |  27  |  1.62<H>    Ca    8.5      11 Oct 2021 05:43  Phos  3.4     10-11  Mg     2.4     10-11    TPro  6.4  /  Alb  2.2<L>  /  TBili  0.5  /  DBili  x   /  AST  197<H>  /  ALT  366<H>  /  AlkPhos  157<H>  10-11    CAPILLARY BLOOD GLUCOSE      POCT Blood Glucose.: 175 mg/dL (11 Oct 2021 16:09)  POCT Blood Glucose.: 178 mg/dL (11 Oct 2021 10:21)  POCT Blood Glucose.: 138 mg/dL (11 Oct 2021 05:39)  POCT Blood Glucose.: 159 mg/dL (10 Oct 2021 23:27)        LIVER FUNCTIONS - ( 11 Oct 2021 05:43 )  Alb: 2.2 g/dL / Pro: 6.4 g/dL / ALK PHOS: 157 U/L / ALT: 366 U/L DA / AST: 197 U/L / GGT: x           Creatinine Trend: 1.62<--, 1.95<--, 2.45<--, 3.03<--, 2.12<--, 2.02<--  I&O's Summary    10 Oct 2021 07:  -  11 Oct 2021 07:00  --------------------------------------------------------  IN: 1800.8 mL / OUT: 2180 mL / NET: -379.2 mL    11 Oct 2021 07:01  -  11 Oct 2021 20:06  --------------------------------------------------------  IN: 1110.6 mL / OUT: 815 mL / NET: 295.6 mL        ABG - ( 11 Oct 2021 04:23 )  pH, Arterial: 7.42  pH, Blood: x     /  pCO2: 42    /  pO2: 176   / HCO3: 27    / Base Excess: 2.4   /  SaO2: 100                 .Body Fluid Pleural Fluid  1008 @ 18:51 --  --  --      .Body Fluid Pleural Fluid  10-08 @ 18:34   No growth to date.  --    polymorphonuclear leukocytes seen  No organisms seen  by cytocentrifuge      .Tissue Right 5th toe r/o osteomyeltis   @ 13:54   No growth at 5 days  --    No polymorphonuclear cells seen per low power field  No organisms seen per oil power field      .Blood Blood-Venous   @ 10:28   No Growth Final  --  --      .Surgical Swab right foot wound   @ 21:42   Culture yields >4 types of aerobic and/or anaerobic bacteria  Call client services within 7 days if further workup is clinically  indicated. Culture includes  Rare Aeromonas hydrophila/caviae  Few Klebsiella oxytoca/Raoutella ornithinolytica  Few Enterococcus faecalis  Few Streptococcus agalactiae (Group B) isolated  Group B streptococci are susceptible to ampicillin,  penicillin and cefazolin, but may be resistant to  erythromycin and clindamycin.  Recommendations for intrapartum prophylaxis for Group B  streptococci are penicillin or ampicillin.  --  Aeromonas hydrophila/caviae  Klebsiella oxytoca /Raoutella ornithinolytica  Enterococcus faecalis      Bone R 5th metatarsal bone clean ma   @ 03:59   No growth at 5 days  --    No polymorphonuclear leukocytes seen per low power field  No organisms seen per oil power field      .Blood Blood-Venous   @ 21:09   No Growth Final  --  --      .Surgical Swab Right foot plantar wound   @ 21:08   Moderate Streptococcus agalactiae (Group B) isolated  Group B streptococci are susceptible to ampicillin,  penicillin and cefazolin, but may be resistant to  erythromycin and clindamycin.  Recommendations for intrapartum prophylaxis for Group B  streptococci are penicillin or ampicillin.  Moderate Bacteroides fragilis "Susceptibilities not performed"  Normal skin filiberto isolated  --  --          MEDICATIONS:    MEDICATIONS  (STANDING):  aspirin  chewable 81 milliGRAM(s) Oral daily  atorvastatin 80 milliGRAM(s) Oral at bedtime  chlorhexidine 0.12% Liquid 15 milliLiter(s) Oral Mucosa every 12 hours  chlorhexidine 2% Cloths 1 Application(s) Topical <User Schedule>  cyanocobalamin 1000 MICROGram(s) Oral daily  ergocalciferol 97199 Unit(s) Oral <User Schedule>  ferrous    sulfate Liquid 300 milliGRAM(s) Enteral Tube daily  finasteride 5 milliGRAM(s) Oral daily  heparin  Infusion.  Unit(s)/Hr (16 mL/Hr) IV Continuous <Continuous>  insulin lispro (ADMELOG) corrective regimen sliding scale   SubCutaneous every 6 hours  meropenem  IVPB 1000 milliGRAM(s) IV Intermittent every 12 hours  metoprolol tartrate 12.5 milliGRAM(s) Oral two times a day  pantoprazole  Injectable 40 milliGRAM(s) IV Push at bedtime  propofol Infusion 30 MICROgram(s)/kG/Min (15.6 mL/Hr) IV Continuous <Continuous>      MEDICATIONS  (PRN):  ondansetron Injectable 4 milliGRAM(s) IV Push three times a day PRN Nausea and/or Vomiting  sodium chloride 0.9% lock flush 10 milliLiter(s) IV Push every 1 hour PRN Pre/post blood products, medications, blood draw, and to maintain line patency      REVIEW OF SYSTEMS:                           ALL ROS DONE [ X   ]    CONSTITUTIONAL:  LETHARGIC [   ], FEVER [   ], UNRESPONSIVE [   ]  CVS:  CP  [   ], SOB, [   ], PALPITATIONS [   ], DIZZYNESS [   ]  RS: COUGH [   ], SPUTUM [   ]  GI: ABDOMINAL PAIN [   ], NAUSEA [   ], VOMITINGS [   ], DIARRHEA [   ], CONSTIPATION [   ]  :  DYSURIA [   ], NOCTURIA [   ], INCREASED FREQUENCY [   ], DRIBLING [   ],  SKELETAL: PAINFUL JOINTS [   ], SWOLLEN JOINTS [   ], NECK ACHE [   ], LOW BACK ACHE [   ],  SKIN : ULCERS [   ], RASH [   ], ITCHING [   ]  CNS: HEAD ACHE [   ], DOUBLE VISION [   ], BLURRED VISION [   ], AMS / CONFUSION [   ], SEIZURES [   ], WEAKNESS [   ],TINGLING / NUMBNESS [   ]    PHYSICAL EXAMINATION:  GENERAL APPEARANCE: NO DISTRESS  HEENT:  NO PALLOR, NO  JVD,  NO   NODES, NECK SUPPLE  CVS: S1 +, S2 +,   RS: AEEB,  OCCASIONAL  RALES +,   NO RONCHI, CRACKLES +   ET+  ABD: SOFT, NT, NO, BS +       EXT: NO PE  SKIN: WARM,   RIGHT FOOT WRAPPED W/ WOUND VAC IN PLACE   ,   CVC+  ,   CT + [RIGH] ATTACHED TO PLEUROVAC  SKELETAL:  ROM ACCEPTABLE  CNS:  AAO X  0    RADIOLOGY :    EXAM:  CT ABDOMEN AND PELVIS OC                            PROCEDURE DATE:  2021          INTERPRETATION:  CLINICAL INFORMATION: Small bowel obstruction.    COMPARISON: None.    CONTRAST/COMPLICATIONS:  IV Contrast: NONE  Oral Contrast: Omnipaque 300  Complications: None reported at time of study completion    PROCEDURE:  CT of the Abdomen and Pelvis was performed.  Sagittal and coronal reformats were performed.    FINDINGS:  LOWER CHEST: Small bilateral pleural effusions with compressiveatelectasis of both lower lobes.    LIVER: Within normal limits.  BILE DUCTS: Normal caliber.  GALLBLADDER: Distended. Small layering gallstones.  SPLEEN: Within normal limits.  PANCREAS: Within normal limits.  ADRENALS: Within normal limits.  KIDNEYS/URETERS: No hydronephrosis. Nonobstructing left upper pole calculus, measuring 0.6 cm.    BLADDER: Urinary bladder contains air and a Castañeda catheter balloon.  REPRODUCTIVE ORGANS: Prostate is not enlarged.    BOWEL: No bowel obstruction. Appendix isnormal. Colonic diverticulosis.  PERITONEUM: Mild presacral fluid.  VESSELS: Atherosclerotic changes.  RETROPERITONEUM/LYMPH NODES: No lymphadenopathy.  ABDOMINAL WALL: Within normal limits.  BONES: Degenerative changes. Sternotomy.    IMPRESSION:  No acute intra-abdominal pathology.    Small bilateral pleural effusions with compressive atelectasis of both lower lobes.    ====================================================    EXAM:  MR FOOT RT                            PROCEDURE DATE:  2021          INTERPRETATION:  Clinical Information: Recent fifth metatarsal head resection now with fifth digit pain, swelling and foul discharge.    Comparison: Radiographs of the right foot from 2021 and MRI the right foot from 2021.    Technique:  MRI of the right midfoot and forefoot.  Intravenous Contrast: None.    Findings:    There is a resection at the mid aspect of the fifth metatarsal shaft. There is a lateral soft tissue wound beginning at the level of the amputation which extends distally. There is susceptibility artifact in the region of the amputation consistent with postoperative change. There is hyperintense T2 marrow signal throughout the remaining fifth metatarsal and within the adjacent fourth metatarsal head which is nonspecific and could be related to recent postoperative changes although osteomyelitis is suspected.    There is edema and mild atrophy within the plantar muscles of the foot. There is minimal spurring at the first metatarsophalangeal and hallux sesamoid articulations.    Impression:  Resection at the mid aspect of the fifth metatarsal. Lateral soft tissue wound with marrow signal abnormality throughout the fifth metatarsal and within the adjacent fourth metatarsal head which is nonspecific in the setting of recent surgery although osteomyelitis is suspected.        ASSESSMENT :     Headache    HTN (hypertension)    HLD (hyperlipidemia)    DM (diabetes mellitus)    CAD (coronary artery disease)    Glaucoma, angle-closure    [place] (hard of hearing)    No significant past surgical history    S/P CABG (coronary artery bypass graft)    History of thyroid surgery        PLAN:  HPI:  76M from home, lives with daughter w/ PMH DM on insulin, HTN, HLD, CAD, PSH R partial 5th ray amputation 2021 p/w R foot pain x1 day associated with foul smelling discharge. Pt with sharp pain on the R lateral foot. As per daughter, pt was taking tylenol which did not help his pain prompting the patient to ask his daughter to take him to the hospital. Pt is a poor historian. Daughter states that the patient did not see his podiatrist after the surgery. No fever, chest, pain, palpitations, nausea, vomiting, diarrhea (17 Sep 2021 01:11)    # TRANSFERRED TO ICU FOR WORSENING HYPOXIA AND DYSPNEA    # SUSPECT RIGHT FOOT OSTEOMYELITIS S/P RIGHT 5TH RAY RESECTION [] AND S/P DEBRIDEMENT, GRAFT APPLICATION, BONE BX AND WOUND VAC APPLICATION   ** RECENT ADMISSION FOR RIGHT DIABETIC FOOT ULCER, CELLULITIS, OSTEOMYELITIS RIGHT 5th METATARSAL S/P RIGHT 5TH PARTIAL RAY AMPUTATION [] - BONE PATHOLOGY W/ CLEAN MARGINS    - S/P VANCOMYCIN AND ZOSYN ; NOW ON UNASYN AND LEVAQUIN GIVEN OREN; PER ID SWITCH TO ZOSYN UNTIL 11/3  - RECENTLY HAD SIMON W/ MILD PAD  - REVIEWED WOUND CX [ ENTEROCOCCUS, AEROMONAS, KLEBSIELLA] AND BCX  - BONE BX - acute osteomyelitis and necrotic periosteal tissue.   - ID CONSULT, PODIATRY CONSULT    - PICC LINE PLACED TODAY TO COMPLETE 6 WEEKS OF ANTIBIOTICS - PER ID SWITCH TO ZOSYN UNTIL 11/3    # ACUTE HYPOXIC RESPIRATORY FAILURE SUSPECT S/T PULMONARY EDEMA IN THE SETTING OF SEVERE AORTIC STENOSIS + ASPIRATION PNA; COMBINED SYSTOLIC + DIASTOLIC HF - S/P CT TUBE PLACEMENT [SET TO PLEUROVAC] - SWITCHED FROM LEVAQUIN TO VANCOMYCIN + MEROPENEM, LASIX, CARDIOLOGY CONSULT IN PROGRESS  - CRITICAL CARE CONSULT  - ASPIRATION EVENT WHEN PATIENT REMOVED NGT   - S/P LASIX ON 10/1 - 10/2, 10/4 AND 10/5  - CT CHEST W/ BILATERAL PLEURAL EFFUSIONS [10/5] - PULMONOLOGY CONSULT FOR POSSIBLE THORACENTESIS & WILL CONTINUE LASIX   - ALSO F/U REPEAT ECHOCARDIOGRAM  - CT SURGERY WAS CONSULTED BUT UNABLE TO PLACE PIGTAIL, THUS IR CONSULT IN PROGRESS FOR PIGTAIL PLACEMENT  - CHEST TUBE PLACED [10/8]  - NOTED REPEAT ECHO    # SHOCK - ? S/T HYPOVOLEMIA VS. SEPSIS - W/ CVC [SWITCHED FROM FEMORAL TO LEFT IJ], S/P VASOPRESSORS - SWITCHED TO VANCOMYCIN AND MEROPENEM  - F/U BCX, F/U UA  - ID CONSULT IN PROGRESS    # TRANSIENT NEW ONSET A.FIB, PAROXYSMAL - MONITORING ON CARDIAC MONITOR, HEPARIN GTT, CARDIOLOGY CONSULT IN PROGRESS    # FUNGURIA - D/C FLUCONAZOLE GIVEN TRANSAMINITIS    # TRANSAMINITIS - SUSPECT THIS IS ISCHEMIC HEPATITIS - IMPROVING - HEPATOLOGY CONSULT IN PROGRESS    # BOWEL DISTENTION - ? ILEUS - OPTIMIZING ELECTROLYTES - IMPROVED  - SURGERY CONSULT IN PROGRESS  - PASSED 2 BMS ON     # CHOLELITHIASIS - TRENDING LFTS, RUQ U/S - CHOLELITHIASIS, SURGERY CONSULT IN PROGRESS  - GI CONSULT IN PROGRESS - LESS SUSPICIOUS FOR CHOLECYSTITIS    # DYSPEPSIA - PLACED ON PPI BID WITH IMPROVEMENT, UNDERWENT ST EVAL     # ELEVATED TROPONINS - NSTEMI - ? DEMAND - WILL NEED ISCHEMIC EVAL ONCE ACUTE INFECTION RESOLVES PER CARDIOLOGY, CARDIOLOGY CONSULT IN PROGRESS  - ON ASA, STATIN, BB    # OREN ON CKD - S/T ATN AND URINARY RETENTION - S/P IVF, NOW W/ CASTAÑEDA, NEPHROLOGY CONUSLT IN PROGRESS  - ON FLOMAX, ADDED FINASTERIDE    - WITH WORSENING RENAL FXN - NOTED URINARY RETENTION [10/4] - REPLACED CASTAÑEDA    # MEDICAL CLEARANCE FOR SURGERY - RCRI - 2 - 10.1 % 30 DAY RISK OF DEATH, MI OR CARDIAC ARREST  - CARDIOLOGY CONSULT     # DM -  HBA1C - 11  - LANTUS, SSI + FS    # CAD S/P CABG - PLACED ON ASA, STATIN, BB  - ECHO - SEVERE AORTIC STENOSIS, SEVERE CONCENTRIC LVH, G1DD, MILD TN  - CARDIOLOGY CONSULT - DR. BASSETT   - MAY NEED ISCHEMIC EVAL ONCE ACUTE ILLNESS RESOLVES    # HTN - ON METOPROLOL, NICARDIPINE    # SEVERE, ASYMPTOMATIC, STENOSIS - ONCE ACUTE ILLNESS RESOLVES, WILL LIKELY NEED ISCHEMIC WORKUP, SABIHA TO EVALUATE FURTHER. D/W PATIENT, DAUGHTER AND CARDIOLOGY TEAM [PREVIOUSLY SAW DR. BASSETT]    # VITAMIN D DEFICIENCY - ON CHOLECALCIFEROL    # HLD - STATIN    # CASE DISCUSSED AT LENGTH WITH PATIENT AND DAUGHTER, BIRDIE ROBERT AT BEDSIDE AND VIA PHONE @ [phone] [10/5] - CASE DICUSSED AT LENGTH AND ALL QUESTIONS WERE ANSWERED. DAUGHTER UNDERSTOOD THAT PROGNOSIS IS GUARDED.  # PATIENT AND DAUGHTER REFUSED [facility] ON PREVIOUS ADMISSION, PREVIOUSLY WISHED FOR PATIENT RETURN HOME W/ HOME PT; HOWEVER NOW, DAUGHTER WISHES FOR PATIENT TO GO TO [facility], AS PATIENT'S WIFE IS CURRENTLY ADMITTED THERE    # GI AND DVT PPX

## 2021-10-12 LAB
ALBUMIN SERPL ELPH-MCNC: 2 G/DL — LOW (ref 3.5–5)
ALP SERPL-CCNC: 155 U/L — HIGH (ref 40–120)
ALT FLD-CCNC: 252 U/L DA — HIGH (ref 10–60)
ANION GAP SERPL CALC-SCNC: 5 MMOL/L — SIGNIFICANT CHANGE UP (ref 5–17)
APTT BLD: 91.7 SEC — HIGH (ref 27.5–35.5)
AST SERPL-CCNC: 137 U/L — HIGH (ref 10–40)
BASE EXCESS BLDA CALC-SCNC: 3 MMOL/L — SIGNIFICANT CHANGE UP (ref -2–3)
BILIRUB SERPL-MCNC: 0.4 MG/DL — SIGNIFICANT CHANGE UP (ref 0.2–1.2)
BLOOD GAS COMMENTS ARTERIAL: SIGNIFICANT CHANGE UP
BUN SERPL-MCNC: 24 MG/DL — HIGH (ref 7–18)
CALCIUM SERPL-MCNC: 8.3 MG/DL — LOW (ref 8.4–10.5)
CHLORIDE SERPL-SCNC: 114 MMOL/L — HIGH (ref 96–108)
CO2 SERPL-SCNC: 28 MMOL/L — SIGNIFICANT CHANGE UP (ref 22–31)
CREAT SERPL-MCNC: 1.43 MG/DL — HIGH (ref 0.5–1.3)
GLUCOSE SERPL-MCNC: 135 MG/DL — HIGH (ref 70–99)
HCO3 BLDA-SCNC: 27 MMOL/L — SIGNIFICANT CHANGE UP (ref 21–28)
HCT VFR BLD CALC: 24.9 % — LOW (ref 39–50)
HCT VFR BLD CALC: 25.9 % — LOW (ref 39–50)
HGB BLD-MCNC: 7.8 G/DL — LOW (ref 13–17)
HGB BLD-MCNC: 8.1 G/DL — LOW (ref 13–17)
HOROWITZ INDEX BLDA+IHG-RTO: 40 — SIGNIFICANT CHANGE UP
MAGNESIUM SERPL-MCNC: 2.5 MG/DL — SIGNIFICANT CHANGE UP (ref 1.6–2.6)
MCHC RBC-ENTMCNC: 29.2 PG — SIGNIFICANT CHANGE UP (ref 27–34)
MCHC RBC-ENTMCNC: 29.8 PG — SIGNIFICANT CHANGE UP (ref 27–34)
MCHC RBC-ENTMCNC: 31.3 GM/DL — LOW (ref 32–36)
MCHC RBC-ENTMCNC: 31.3 GM/DL — LOW (ref 32–36)
MCV RBC AUTO: 93.5 FL — SIGNIFICANT CHANGE UP (ref 80–100)
MCV RBC AUTO: 95 FL — SIGNIFICANT CHANGE UP (ref 80–100)
NRBC # BLD: 0 /100 WBCS — SIGNIFICANT CHANGE UP (ref 0–0)
NRBC # BLD: 0 /100 WBCS — SIGNIFICANT CHANGE UP (ref 0–0)
PCO2 BLDA: 39 MMHG — SIGNIFICANT CHANGE UP (ref 35–48)
PH BLDA: 7.45 — SIGNIFICANT CHANGE UP (ref 7.35–7.45)
PHOSPHATE SERPL-MCNC: 2.3 MG/DL — LOW (ref 2.5–4.5)
PLATELET # BLD AUTO: 166 K/UL — SIGNIFICANT CHANGE UP (ref 150–400)
PLATELET # BLD AUTO: 170 K/UL — SIGNIFICANT CHANGE UP (ref 150–400)
PO2 BLDA: 141 MMHG — HIGH (ref 83–108)
POTASSIUM SERPL-MCNC: 3.8 MMOL/L — SIGNIFICANT CHANGE UP (ref 3.5–5.3)
POTASSIUM SERPL-SCNC: 3.8 MMOL/L — SIGNIFICANT CHANGE UP (ref 3.5–5.3)
PROT SERPL-MCNC: 6.2 G/DL — SIGNIFICANT CHANGE UP (ref 6–8.3)
RBC # BLD: 2.62 M/UL — LOW (ref 4.2–5.8)
RBC # BLD: 2.77 M/UL — LOW (ref 4.2–5.8)
RBC # FLD: 15.2 % — HIGH (ref 10.3–14.5)
RBC # FLD: 15.4 % — HIGH (ref 10.3–14.5)
SAO2 % BLDA: 100 % — SIGNIFICANT CHANGE UP
SODIUM SERPL-SCNC: 147 MMOL/L — HIGH (ref 135–145)
VANCOMYCIN TROUGH SERPL-MCNC: 19.5 UG/ML — SIGNIFICANT CHANGE UP (ref 10–20)
WBC # BLD: 4.82 K/UL — SIGNIFICANT CHANGE UP (ref 3.8–10.5)
WBC # BLD: 5.48 K/UL — SIGNIFICANT CHANGE UP (ref 3.8–10.5)
WBC # FLD AUTO: 4.82 K/UL — SIGNIFICANT CHANGE UP (ref 3.8–10.5)
WBC # FLD AUTO: 5.48 K/UL — SIGNIFICANT CHANGE UP (ref 3.8–10.5)

## 2021-10-12 PROCEDURE — 99232 SBSQ HOSP IP/OBS MODERATE 35: CPT

## 2021-10-12 PROCEDURE — 71045 X-RAY EXAM CHEST 1 VIEW: CPT | Mod: 26

## 2021-10-12 RX ORDER — POTASSIUM PHOSPHATE, MONOBASIC POTASSIUM PHOSPHATE, DIBASIC 236; 224 MG/ML; MG/ML
15 INJECTION, SOLUTION INTRAVENOUS ONCE
Refills: 0 | Status: COMPLETED | OUTPATIENT
Start: 2021-10-12 | End: 2021-10-12

## 2021-10-12 RX ORDER — METOPROLOL TARTRATE 50 MG
25 TABLET ORAL
Refills: 0 | Status: DISCONTINUED | OUTPATIENT
Start: 2021-10-12 | End: 2021-11-03

## 2021-10-12 RX ORDER — METOPROLOL TARTRATE 50 MG
12.5 TABLET ORAL ONCE
Refills: 0 | Status: COMPLETED | OUTPATIENT
Start: 2021-10-12 | End: 2021-10-12

## 2021-10-12 RX ADMIN — Medication 300 MILLIGRAM(S): at 12:41

## 2021-10-12 RX ADMIN — Medication 300 MILLIGRAM(S): at 12:42

## 2021-10-12 RX ADMIN — Medication 12.5 MILLIGRAM(S): at 18:16

## 2021-10-12 RX ADMIN — MEROPENEM 100 MILLIGRAM(S): 1 INJECTION INTRAVENOUS at 05:23

## 2021-10-12 RX ADMIN — POTASSIUM PHOSPHATE, MONOBASIC POTASSIUM PHOSPHATE, DIBASIC 62.5 MILLIMOLE(S): 236; 224 INJECTION, SOLUTION INTRAVENOUS at 06:46

## 2021-10-12 RX ADMIN — CHLORHEXIDINE GLUCONATE 15 MILLILITER(S): 213 SOLUTION TOPICAL at 05:24

## 2021-10-12 RX ADMIN — FINASTERIDE 5 MILLIGRAM(S): 5 TABLET, FILM COATED ORAL at 12:41

## 2021-10-12 RX ADMIN — Medication 2: at 00:24

## 2021-10-12 RX ADMIN — POTASSIUM PHOSPHATE, MONOBASIC POTASSIUM PHOSPHATE, DIBASIC 62.5 MILLIMOLE(S): 236; 224 INJECTION, SOLUTION INTRAVENOUS at 07:22

## 2021-10-12 RX ADMIN — CHLORHEXIDINE GLUCONATE 15 MILLILITER(S): 213 SOLUTION TOPICAL at 17:50

## 2021-10-12 RX ADMIN — PREGABALIN 1000 MICROGRAM(S): 225 CAPSULE ORAL at 12:41

## 2021-10-12 RX ADMIN — Medication 2: at 23:19

## 2021-10-12 RX ADMIN — Medication 2: at 17:48

## 2021-10-12 RX ADMIN — Medication 81 MILLIGRAM(S): at 12:41

## 2021-10-12 RX ADMIN — CHLORHEXIDINE GLUCONATE 1 APPLICATION(S): 213 SOLUTION TOPICAL at 05:23

## 2021-10-12 RX ADMIN — MEROPENEM 100 MILLIGRAM(S): 1 INJECTION INTRAVENOUS at 17:48

## 2021-10-12 RX ADMIN — PANTOPRAZOLE SODIUM 40 MILLIGRAM(S): 20 TABLET, DELAYED RELEASE ORAL at 22:53

## 2021-10-12 RX ADMIN — ATORVASTATIN CALCIUM 80 MILLIGRAM(S): 80 TABLET, FILM COATED ORAL at 22:53

## 2021-10-12 RX ADMIN — Medication 12.5 MILLIGRAM(S): at 23:19

## 2021-10-12 RX ADMIN — PROPOFOL 15.6 MICROGRAM(S)/KG/MIN: 10 INJECTION, EMULSION INTRAVENOUS at 01:22

## 2021-10-12 NOTE — PROGRESS NOTE ADULT - ASSESSMENT
76M with b/l Pleural effusion, right greater than left, s/p R pigtail placement 10/8    - Continue pigtail to waterseal, monitor output   - F/u AM CXR  - Remainder of care per ICU team

## 2021-10-12 NOTE — PROGRESS NOTE ADULT - ASSESSMENT
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  As per daughter, patient was taking tylenol which did not help his pain prompting the patient to ask his daughter to take him to the hospital. ON admission, he has no fever or Leukocytosis. The Xray of Right foot shows no Osteomyelitis but MRI of Right foot shows Lateral soft tissue wound with marrow signal abnormality throughout the fifth metatarsal which is consistent of Osteomyelitis. He has seen by Podiatry and wound culture has sent, Zosyn and Vancomycin has started. The ID consult requested to assist with further evaluation and antibiotic management.     # Right Fifth metatarsal DFU with drainage and Osteomyelitis- wound cx grew Enterococcus, Aeromonas and Klebsiella - Zosyn is the ideal to cover All organisms but kidney function is worsening, hence change to Unasyn and Levaquin until kidney function is improved - The pathology shows Bone with acute osteomyelitis and necrotic periosteal tissue.   # OREN- s/p urinary retention -s/p ibrahim catheter - s/p discontinue ACEI  # RLL pneumonia- most likely Aspiration, S/p Vomiting - On Unasyn  # Large bowel distension  # Candiduria- 9/22/21  # Pulmonary edema with bilateral moderate to large pleural effusions- on CT chest, 10/5/21- s/p intubation 10/7- s/p Right sided chest tube placement by CT team, 10/8/21    would recommend:    1. Follow up Vancomycin  level and adjust doses accordingly to keep level between 15 to 20  2. Monitor  kidney function and adjust Abx doses accordingly   3. Continue  Meropenem and Vancomycin based on Crcl  4. Management of Vent as per ICU protocol  5. Wound care as per Podiatry  6. Aspiration precaution    d/w ICU team     Attending Attestation:    Spent more than 35 minutes on total encounter, more than 50 % of the visit was spent counseling and/or coordinating care by the Attending physician.

## 2021-10-12 NOTE — PROGRESS NOTE ADULT - ASSESSMENT
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 s/p OR debridement today. Nephrology consulted for abrupt rise in SCr.     1. OREN- Recurrent ATN in the setting of infection. Lisinopril discontinued 9/22. ATN resolving with good urine o/p. Now off diuretics; likely recovery phase/ diuretic phase.    s/p CT chest with b/l mod to large pleural effusion R>L. s/p rt pigtail catheter.   Kidney/ Bladder US with large distended bladder s/p ibrahim placement on 9/23, then removed. s/p bladder scan 10/4 with 1L urine for which ibrahim was reinserted; however; renal function did not improve post ibrahim; less likely due to obstructive uropathy.  No peripheral eosinophilia- no signs of AIN. C3 & C4 wnl with neg ASO- no signs of PIGN. Strict I/Os. Avoid nephrotoxins/ NSAIDs/ RCA. Monitor BMP.    2. 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 Will defer secondary w/u as an outpt. Avoid nephrotoxins  3. HTN 2/2 CKD- BP acceptable.  Now off pressors. Plan as per ICU. Monitor BP  4. Acute osteomyelitis- s/p debridement 9/22. Pt now on Vanco and meropenem (renally dosed). Monitor Vanco trough. Pt with mild hypernatremia- change Meropenem to D5 base. Plan as per ID      [facility] NEPHROLOGY  Bryn Johnson M.D.  Dominog Booth D.O.  Zakia Rodriguez M.D.  Zonia Adams, MSN, ANP-C  [phone]    [address] [place] [address]   Patient is a 75yo Male with DM on insulin, HTN, HLD, CAD, s/p R partial 5th ray amputation 8/19/2021 p/w R foot pain and foul drainage a/w diabetic foot ulcer suspicious for osteomyelitis. s/p OR debridement today. Nephrology consulted for abrupt rise in SCr.     1. OREN- Recurrent ATN in the setting of infection. Lisinopril discontinued 9/22. ATN resolving with good urine o/p. Now off diuretics; likely recovery phase/ diuretic phase.    s/p CT chest with b/l mod to large pleural effusion R>L. s/p rt pigtail catheter. Phos repleted with Kphos IV. Monitor lytes  Kidney/ Bladder US with large distended bladder s/p ibrahim placement on 9/23, then removed. s/p bladder scan 10/4 with 1L urine for which ibrahim was reinserted; however; renal function did not improve post ibrahim; less likely due to obstructive uropathy.  No peripheral eosinophilia- no signs of AIN. C3 & C4 wnl with neg ASO- no signs of PIGN. Strict I/Os. Avoid nephrotoxins/ NSAIDs/ RCA. Monitor BMP.    2. 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 Will defer secondary w/u as an outpt. Avoid nephrotoxins  3. HTN 2/2 CKD- BP acceptable.  Now off pressors. Plan as per ICU. Monitor BP  4. Acute osteomyelitis- s/p debridement 9/22. Pt now on Vanco and meropenem (renally dosed). Monitor Vanco trough. Pt with mild hypernatremia- change Meropenem to D5 base. Plan as per ID      [facility] NEPHROLOGY  Bryn Johnson M.D.  Domingo Booth D.O.  Zakia Rodriguez M.D.  Zonia Adams, MSN, ANP-C  [phone]    [address] [place] [address]

## 2021-10-12 NOTE — PROGRESS NOTE ADULT - ASSESSMENT
75yo Male with Hx of HTN, HLD, CAD, s/p CABG, severe AS, PAD, s/p R partial 5th ray amputation (8/19/21) presented to [facility] ED on 9/16/21 with R foot pain and foul drainage a/w diabetic foot ulcer  (wound Cx grew Enterococcus, Aeromonas, Klebsiella, Group B Strep 9/16). He was found to have acute osteomyelitis,  s/p OR debridement, bone biopsy, wound vac on 9/22/21. Also noted OREN, and acute hypoxic respiratory failure due to pulmonary edema in setting of severe AS + aspirational PNA, as well as pleural effusions, required intubation on 10/7, became hemodynamically unstable, requiring pressor support since 10/7.   Hepatology was consulted on 10/8 for acute, severe, hepatocellular liver injury, with intact function and cholestatic parameters, suspected likely due to ischemic injury, vs. DILI. The sudden acute rise and significant improvement (AST became half in a day), rather support the ischemic injury.   Transaminases continued to improve,  <--1057, <--559. ALP has been elevated since 10/10, no significant change compared to yesterday and bilirubin remains normal.    - C/w close monitoring of LFTs, including INR  - Avoid hepatotoxic medications when medically feasible (fluconazole and levofloxacin was d/c), if ALT elevation persists > 5-10x ULN, need to evaluate statin.  - C/w supportive measures per ICU  - C/w antibiotics per ID  - Can send Hep viral panel    - Cholelithiasis and distended GB on prior US 9/28, was no evidence of acute cholecystitis, was seen by GI. Later developed mostly hepatocellular elevation with normal cholestatic parameters. Now ALP slightly up-trending since 10/10, although bilirubin remains normal. Can send bone specific ALP.  - Consider repeat RUQ US if cholestatic parameters worsen.    - On AXR 10/6 concern joey for pneumoperitoneum -patient was too unstable for follow up imaging. Consider follow up imaging when feasible.    Thank you for the consult  Will continue to follow.   Discussed with ICU team.

## 2021-10-12 NOTE — PROGRESS NOTE ADULT - SUBJECTIVE AND OBJECTIVE BOX
INTERVAL HPI/OVERNIGHT EVENTS:  Pt remains intubated and sedated.     MEDICATIONS  (STANDING):  aspirin  chewable 81 milliGRAM(s) Oral daily  atorvastatin 80 milliGRAM(s) Oral at bedtime  chlorhexidine 0.12% Liquid 15 milliLiter(s) Oral Mucosa every 12 hours  chlorhexidine 2% Cloths 1 Application(s) Topical <User Schedule>  cyanocobalamin 1000 MICROGram(s) Oral daily  ergocalciferol 60465 Unit(s) Oral <User Schedule>  ferrous    sulfate Liquid 300 milliGRAM(s) Enteral Tube daily  finasteride 5 milliGRAM(s) Oral daily  heparin  Infusion.  Unit(s)/Hr (16 mL/Hr) IV Continuous <Continuous>  insulin lispro (ADMELOG) corrective regimen sliding scale   SubCutaneous every 6 hours  meropenem  IVPB 1000 milliGRAM(s) IV Intermittent every 12 hours  metoprolol tartrate 12.5 milliGRAM(s) Oral two times a day  pantoprazole  Injectable 40 milliGRAM(s) IV Push at bedtime  potassium phosphate IVPB 15 milliMole(s) IV Intermittent once  propofol Infusion 30 MICROgram(s)/kG/Min (15.6 mL/Hr) IV Continuous <Continuous>  vancomycin  IVPB 1500 milliGRAM(s) IV Intermittent daily    MEDICATIONS  (PRN):  ondansetron Injectable 4 milliGRAM(s) IV Push three times a day PRN Nausea and/or Vomiting  sodium chloride 0.9% lock flush 10 milliLiter(s) IV Push every 1 hour PRN Pre/post blood products, medications, blood draw, and to maintain line patency      Vital Signs Last 24 Hrs  T(C): 36.3 (12 Oct 2021 08:00), Max: 37.3 (11 Oct 2021 23:01)  T(F): 97.3 (12 Oct 2021 08:00), Max: 99.2 (11 Oct 2021 23:01)  HR: 89 (12 Oct 2021 09:16) (73 - 103)  BP: 140/63 (12 Oct 2021 09:00) (106/49 - 148/72)  BP(mean): 81 (12 Oct 2021 09:00) (61 - 90)  RR: 16 (12 Oct 2021 09:00) (12 - 20)  SpO2: 100% (12 Oct 2021 09:16) (79% - 100%)    Physical:  General: intubated, sedated  chest: symmetrical chest rise, right sided pigtail catheter in place to waterseal; no airleak, no crepitus; pigtail flushed at bedside, flowing; 100cc ss output /24hrs    I&O's Detail  11 Oct 2021 07:01  -  12 Oct 2021 07:00  --------------------------------------------------------  IN:    Enteral Tube Flush: 460 mL    FentaNYL: 8.6 mL    Glucerna 1.5: 800 mL    Heparin Infusion: 216 mL    IV PiggyBack: 600 mL    Propofol: 150.8 mL  Total IN: 2235.4 mL    OUT:    Chest Tube (mL): 100 mL    Indwelling Catheter - Urethral (mL): 1035 mL    Stool (mL): 400 mL  Total OUT: 1535 mL  Total NET: 700.4 mL    LABS:                      7.8    4.82  )-----------( 166      ( 12 Oct 2021 04:34 )             24.9             10-12    147<H>  |  114<H>  |  24<H>  ----------------------------<  135<H>  3.8   |  28  |  1.43<H>    Ca    8.3<L>      12 Oct 2021 04:34  Phos  2.3     10-12  Mg     2.5     10-12    TPro  6.2  /  Alb  2.0<L>  /  TBili  0.4  /  DBili  x   /  AST  137<H>  /  ALT  252<H>  /  AlkPhos  155<H>  10-12

## 2021-10-12 NOTE — CHART NOTE - NSCHARTNOTEFT_GEN_A_CORE
Contacted the patient's daughter Arabella Oliva and updated her on the patient's condition and about the SAT and SBT. Daughter made aware of bleeding through the Left PICC line and notified about Heparin being held.

## 2021-10-12 NOTE — PROGRESS NOTE ADULT - SUBJECTIVE AND OBJECTIVE BOX
Patient is seen and examined at the bed side, is afebrile. He remains intubated/sedated and off pressor.  The kidney function continue improving.       REVIEW OF SYSTEMS: Unable to obtain due to mental status      ALLERGIES: No Known Allergies      ICU Vital Signs Last 24 Hrs  T(C): 36.3 (12 Oct 2021 08:00), Max: 37.3 (11 Oct 2021 23:01)  T(F): 97.3 (12 Oct 2021 08:00), Max: 99.2 (11 Oct 2021 23:01)  HR: 81 (12 Oct 2021 12:13) (73 - 89)  BP: 140/75 (12 Oct 2021 11:00) (106/49 - 140/75)  BP(mean): 86 (12 Oct 2021 11:00) (61 - 86)  ABP: --  ABP(mean): --  RR: 21 (12 Oct 2021 11:00) (14 - 21)  SpO2: 99% (12 Oct 2021 12:13) (79% - 100%)      PHYSICAL EXAM:  GENERAL: Intubated/vented  CHEST/LUNG:  Not using accessory muscles, Right CT in placed   HEART: s1 and s2 present  ABDOMEN:  Mild distended   EXTREMITIES: Right foot bandage in placed   CNS: Intubated/ vented      LABS:                        8.1    5.48  )-----------( 170      ( 12 Oct 2021 12:13 )             25.9                           8.5    5.50  )-----------( 163      ( 11 Oct 2021 05:44 )             27.2         10-12    147<H>  |  114<H>  |  24<H>  ----------------------------<  135<H>  3.8   |  28  |  1.43<H>    Ca    8.3<L>      12 Oct 2021 04:34  Phos  2.3     10-12  Mg     2.5     10-12    TPro  6.2  /  Alb  2.0<L>  /  TBili  0.4  /  DBili  x   /  AST  137<H>  /  ALT  252<H>  /  AlkPhos  155<H>  10-12    10-10    146<H>  |  110<H>  |  32<H>  ----------------------------<  110<H>  3.3<L>   |  30  |  1.95<H>    Ca    8.2<L>      10 Oct 2021 03:54  Phos  3.7     10-10  Mg     2.3     10-10    TPro  6.3  /  Alb  2.4<L>  /  TBili  0.5  /  DBili  x   /  AST  489<H>  /  ALT  559<H>  /  AlkPhos  146<H>  10-10      09-25    139  |  107  |  41<H>  ----------------------------<  134<H>  4.1   |  21<L>  |  4.05<H>    Ca    8.6      25 Sep 2021 06:40    TPro  6.6  /  Alb  2.3<L>  /  TBili  0.5  /  DBili  x   /  AST  25  /  ALT  15  /  AlkPhos  102  09-25        CAPILLARY BLOOD GLUCOSE  POCT Blood Glucose.: 134 mg/dL (17 Sep 2021 17:11)  POCT Blood Glucose.: 128 mg/dL (17 Sep 2021 11:06)  POCT Blood Glucose.: 157 mg/dL (17 Sep 2021 04:10)      MEDICATIONS  (STANDING):    aspirin  chewable 81 milliGRAM(s) Oral daily  atorvastatin 80 milliGRAM(s) Oral at bedtime  chlorhexidine 0.12% Liquid 15 milliLiter(s) Oral Mucosa every 12 hours  chlorhexidine 2% Cloths 1 Application(s) Topical <User Schedule>  cyanocobalamin 1000 MICROGram(s) Oral daily  ergocalciferol 57143 Unit(s) Oral <User Schedule>  ferrous    sulfate Liquid 300 milliGRAM(s) Enteral Tube daily  finasteride 5 milliGRAM(s) Oral daily  heparin  Infusion.  Unit(s)/Hr (16 mL/Hr) IV Continuous <Continuous>  insulin lispro (ADMELOG) corrective regimen sliding scale   SubCutaneous every 6 hours  meropenem  IVPB 1000 milliGRAM(s) IV Intermittent every 12 hours  metoprolol tartrate 12.5 milliGRAM(s) Oral two times a day  pantoprazole  Injectable 40 milliGRAM(s) IV Push at bedtime  vancomycin  IVPB 1500 milliGRAM(s) IV Intermittent daily        RADIOLOGY & ADDITIONAL TESTS:    10/5/21 CT Chest No Cont (10.05.21 @ 14:07) Pulmonary edema with bilateral moderate to large pleural effusions. Underlying bilateral lower lobe compressive atelectasis versus pneumonia.  Mildly enlarged mediastinal lymph nodes, possibly reactive.      10/2/21: Xray Chest 1 View- PORTABLE-Routine (Xray Chest 1 View- PORTABLE-Routine in AM.) (10.02.21 @ 09:46) There is persistent bilateral perihilar/basilar diffuse airspace disease and/or RIGHT effusion.  Cardiomegaly.  No interval change.    Collected Date/Time: 9/22/2021 08:42 EDT   Received Date/Time: 9/23/2021 09:29 EDT   Surgical Pathology Report - Auth (Verified)   Specimen(s) Submitted   1 Right 5th Toe Wound Debridement r/o Osteomyelitis   Final Diagnosis   Right fifth toe; wound debridement:   - Bone with acute osteomyelitis and necrotic periosteal tissue.     9/28/21: US Abdomen Upper Quadrant Right (09.28.21 @ 12:13) Cholelithiasis without evidence of acute cholecystitis. Note is made of right pleural effusion    9/27/21: CT Abdomen and Pelvis w/ Oral Cont (09.27.21 @ 15:53) >No acute intra-abdominal pathology.    Small bilateral pleural effusions with compressive atelectasis of both lower lobes.    9/26/21: Xray Chest 1 View-PORTABLE IMMEDIATE (Xray Chest 1 View-PORTABLE IMMEDIATE .) (09.26.21 @ 15:28) : Small bilateral pleural effusions and mild pulmonary edema. A right lower lobe pneumonia is not excluded.      9/26/21: Xray Abdomen 1 View Portable, IMMEDIATE (Xray Abdomen 1 View Portable, IMMEDIATE .) (09.26.21 @ 15:28) : There is a nasogastric tube with its tip in the stomach. There is gaseous distention of the colon. No pathologic calcifications are seen. The osseous structures are intact with degenerative change is present in the spine.      9/17/21 : MR Foot No Cont, Right (09.17.21 @ 13:04) : Resection at the mid aspect of the fifth metatarsal. Lateral soft tissue wound with marrow signal abnormality throughout the fifth metatarsal and within the adjacent fourth metatarsal head which is nonspecific in the setting of recent surgery although osteomyelitis is suspected.    9/16/21 : Xray Foot AP + Lateral + Oblique, Right (09.16.21 @ 15:03) : No acute finding. No plain film evidence of osteomyelitis.        MICROBIOLOGY DATA:    Urine Microscopic-Add On (NC) (09.22.21 @ 16:14)   Red Blood Cell - Urine: 0-2 /HPF   White Blood Cell - Urine: 3-5 /HPF   Bacteria: Moderate /HPF   Comment - Urine: yeast cells present   Epithelial Cells: Moderate /HPF     Culture - Tissue with Gram Stain (09.22.21 @ 13:54)   Gram Stain: No polymorphonuclear cells seen per low power field   No organisms seen per oil power field   Specimen Source: .Tissue Right 5th toe r/o osteomyeltis   Culture Results: No growth     COVID-19 David Domain Antibody (09.18.21 @ 12:55)   COVID-19 David Domain Antibody Result: >250.00:    Culture - Blood (09.17.21 @ 10:28)   Specimen Source: .Blood Blood-Peripheral   Culture Results: No growth to date.     Culture - Blood (09.17.21 @ 10:28)   Specimen Source: .Blood Blood-Venous   Culture Results: No growth to date.     Culture - Surgical Swab (09.16.21 @ 21:42)   - Amikacin: S <=16   - Amikacin: S <=16   - Amoxicillin/Clavulanic Acid: S <=8/4   - Ampicillin: R >16 These ampicillin results predict results for amoxicillin   - Ampicillin: S <=2 Predicts results to ampicillin/sulbactam, amoxacillin-clavulanate and piperacillin-tazobactam.   - Ampicillin/Sulbactam: S 8/4 Enterobacter, Citrobacter, and Serratia may develop resistance during prolonged therapy (3-4 days)   - Ampicillin/Sulbactam: R >16/8   - Aztreonam: S <=4   - Aztreonam: S <=4   - Cefazolin: R 16 Enterobacter, Citrobacter, and Serratia may develop resistance during prolonged therapy (3-4 days)   - Cefazolin: R >16   - Cefepime: S <=2   - Cefepime: S <=2   - Cefoxitin: S <=8   - Cefoxitin: S <=8   - Ceftazidime: S <=1   - Ceftriaxone: S <=1   - Ceftriaxone: S <=1 Enterobacter, Citrobacter, and Serratia may develop resistance during prolonged therapy   - Ciprofloxacin: S <=0.25   - Ciprofloxacin: S <=0.25   - Ertapenem: S <=0.5   - Gentamicin: S <=2   - Gentamicin: S <=2   - Imipenem: S <=1   - Levofloxacin: S <=0.5   - Levofloxacin: S <=0.5   - Meropenem: S <=1   - Meropenem: S <=1   - Piperacillin/Tazobactam: S <=8   - Piperacillin/Tazobactam: S <=8   - Tetra/Doxy: S 4   - Tobramycin: S <=2   - Trimethoprim/Sulfamethoxazole: S <=0.5/9.5   - Trimethoprim/Sulfamethoxazole: S <=0.5/9.5   - Vancomycin: S 2   Specimen Source: .Surgical Swab right foot wound   Culture Results:   Culture yields >4 types of aerobic and/or anaerobic bacteria   Call client services within 7 days if further workup is clinically   indicated. Culture includes   Rare Aeromonas hydrophila/caviae   Few Klebsiella oxytoca/Raoutella ornithinolytica   Few Enterococcus faecalis   Few Streptococcus agalactiae (Group B) isolated   Group B streptococci are susceptible to ampicillin,   penicillin and cefazolin, but may be resistant to   erythromycin and clindamycin.   Recommendations for intrapartum prophylaxis for Group B   streptococci are penicillin or ampicillin.   Organism Identification: Aeromonas hydrophila/caviae   Klebsiella oxytoca /Raoutella ornithinolytica   Enterococcus faecalis   Organism: Aeromonas hydrophila/caviae   Organism: Klebsiella oxytoca /Raoutella ornithinolytica   Organism: Enterococcus faecalis   COVID-19 PCR . (09.16.21 @ 22:24)   COVID-19 PCR: NotDetec:

## 2021-10-12 NOTE — PROGRESS NOTE ADULT - SUBJECTIVE AND OBJECTIVE BOX
C A R D I O L O G Y  **********************************    DATE OF SERVICE: 10-12-21    Patient is on ventilator support, unable to obtain review of symptoms.    aspirin  chewable 81 milliGRAM(s) Oral daily  atorvastatin 80 milliGRAM(s) Oral at bedtime  chlorhexidine 0.12% Liquid 15 milliLiter(s) Oral Mucosa every 12 hours  chlorhexidine 2% Cloths 1 Application(s) Topical <User Schedule>  cyanocobalamin 1000 MICROGram(s) Oral daily  ergocalciferol 69342 Unit(s) Oral <User Schedule>  ferrous    sulfate Liquid 300 milliGRAM(s) Enteral Tube daily  finasteride 5 milliGRAM(s) Oral daily  heparin  Infusion.  Unit(s)/Hr IV Continuous <Continuous>  insulin lispro (ADMELOG) corrective regimen sliding scale   SubCutaneous every 6 hours  meropenem  IVPB 1000 milliGRAM(s) IV Intermittent every 12 hours  metoprolol tartrate 12.5 milliGRAM(s) Oral two times a day  ondansetron Injectable 4 milliGRAM(s) IV Push three times a day PRN  pantoprazole  Injectable 40 milliGRAM(s) IV Push at bedtime  sodium chloride 0.9% lock flush 10 milliLiter(s) IV Push every 1 hour PRN  vancomycin  IVPB 1500 milliGRAM(s) IV Intermittent daily                            8.1    5.48  )-----------( 170      ( 12 Oct 2021 12:13 )             25.9       Hemoglobin: 8.1 g/dL (10-12 @ 12:13)  Hemoglobin: 7.8 g/dL (10-12 @ 04:34)  Hemoglobin: 8.5 g/dL (10-11 @ 05:44)  Hemoglobin: 8.6 g/dL (10-10 @ 06:50)  Hemoglobin: 7.3 g/dL (10-10 @ 03:54)      10-12    147<H>  |  114<H>  |  24<H>  ----------------------------<  135<H>  3.8   |  28  |  1.43<H>    Ca    8.3<L>      12 Oct 2021 04:34  Phos  2.3     10-12  Mg     2.5     10-12    TPro  6.2  /  Alb  2.0<L>  /  TBili  0.4  /  DBili  x   /  AST  137<H>  /  ALT  252<H>  /  AlkPhos  155<H>  10-12    Creatinine Trend: 1.43<--, 1.62<--, 1.95<--, 2.45<--, 3.03<--, 2.12<--    COAGS: PTT - ( 12 Oct 2021 04:34 )  PTT:91.7 sec      T(C): 36.3 (10-12-21 @ 08:00), Max: 37.3 (10-11-21 @ 23:01)  HR: 81 (10-12-21 @ 12:13) (73 - 103)  BP: 140/75 (10-12-21 @ 11:00) (106/49 - 148/72)  RR: 21 (10-12-21 @ 11:00) (14 - 21)  SpO2: 99% (10-12-21 @ 12:13) (79% - 100%)  Wt(kg): --    I&O's Summary    11 Oct 2021 07:01  -  12 Oct 2021 07:00  --------------------------------------------------------  IN: 2235.4 mL / OUT: 1535 mL / NET: 700.4 mL        HEENT:  (-)icterus (-)pallor  CV: N S1 S2 1/6 TRINIDAD (+)2 Pulses B/l  Resp:  Coarse sounds B/L, normal effort  GI: (+) BS Soft, NT, ND  Lymph:  (-)Edema, (-)obvious lymphadenopathy  Skin: Warm to touch, Normal turgor  Psych: Unable to assess mood and affect    Tele: SR      ASSESSMENT/PLAN: 	76y  Male PMH DM on insulin, HTN, HLD, normal lV fx moderate to severe AS 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    - monitor crt, nephrology f/u appreciated  - Abx per primary team  - Echo noted, Severe AS, EF 40-45%   - He will need a SABIHA and R+L heart cath when he recovers and has no active infection  - Cont medical management of CAD  - Pt. with new onset PAF - heparin held due to bleeding from PICC  - follow up thoracic surgery , s/p Pig tail  - vent support per MICU, spontaneous brething trial today    Zak Wilkins MD, Located within [facility]  [place] [phone]

## 2021-10-12 NOTE — PROGRESS NOTE ADULT - SUBJECTIVE AND OBJECTIVE BOX
`Patient is a 76y old  Male who presents with a chief complaint of R Foot Pain (12 Oct 2021 09:42)    PATIENT IS SEEN AND EXAMINED IN MEDICAL FLOOR.  JOCELYN [    ]    MADDY [   ]      GT [   ]    ALLERGIES:  No Known Allergies      Daily     Daily Weight in k.7 (12 Oct 2021 08:00)    VITALS:    Vital Signs Last 24 Hrs  T(C): 36.3 (12 Oct 2021 08:00), Max: 37.3 (11 Oct 2021 23:01)  T(F): 97.3 (12 Oct 2021 08:00), Max: 99.2 (11 Oct 2021 23:01)  HR: 89 (12 Oct 2021 09:16) (73 - 103)  BP: 140/63 (12 Oct 2021 09:00) (106/49 - 148/72)  BP(mean): 81 (12 Oct 2021 09:00) (61 - 90)  RR: 16 (12 Oct 2021 09:00) (12 - 20)  SpO2: 100% (12 Oct 2021 09:16) (79% - 100%)    LABS:    CBC Full  -  ( 12 Oct 2021 04:34 )  WBC Count : 4.82 K/uL  RBC Count : 2.62 M/uL  Hemoglobin : 7.8 g/dL  Hematocrit : 24.9 %  Platelet Count - Automated : 166 K/uL  Mean Cell Volume : 95.0 fl  Mean Cell Hemoglobin : 29.8 pg  Mean Cell Hemoglobin Concentration : 31.3 gm/dL  Auto Neutrophil # : x  Auto Lymphocyte # : x  Auto Monocyte # : x  Auto Eosinophil # : x  Auto Basophil # : x  Auto Neutrophil % : x  Auto Lymphocyte % : x  Auto Monocyte % : x  Auto Eosinophil % : x  Auto Basophil % : x    PT/INR - ( 11 Oct 2021 05:43 )   PT: 14.7 sec;   INR: 1.25 ratio         PTT - ( 12 Oct 2021 04:34 )  PTT:91.7 sec  10-12    147<H>  |  114<H>  |  24<H>  ----------------------------<  135<H>  3.8   |  28  |  1.43<H>    Ca    8.3<L>      12 Oct 2021 04:34  Phos  2.3     10-12  Mg     2.5     10-12    TPro  6.2  /  Alb  2.0<L>  /  TBili  0.4  /  DBili  x   /  AST  137<H>  /  ALT  252<H>  /  AlkPhos  155<H>  10-12    CAPILLARY BLOOD GLUCOSE      POCT Blood Glucose.: 180 mg/dL (12 Oct 2021 00:23)  POCT Blood Glucose.: 175 mg/dL (11 Oct 2021 16:09)  POCT Blood Glucose.: 178 mg/dL (11 Oct 2021 10:21)        LIVER FUNCTIONS - ( 12 Oct 2021 04:34 )  Alb: 2.0 g/dL / Pro: 6.2 g/dL / ALK PHOS: 155 U/L / ALT: 252 U/L DA / AST: 137 U/L / GGT: x           Creatinine Trend: 1.43<--, 1.62<--, 1.95<--, 2.45<--, 3.03<--, 2.12<--  I&O's Summary    11 Oct 2021 07:01  -  12 Oct 2021 07:00  --------------------------------------------------------  IN: 2235.4 mL / OUT: 1535 mL / NET: 700.4 mL        ABG - ( 12 Oct 2021 04:32 )  pH, Arterial: 7.45  pH, Blood: x     /  pCO2: 39    /  pO2: 141   / HCO3: 27    / Base Excess: 3.0   /  SaO2: 100                 .Body Fluid Pleural Fluid  10-08 @ 18:51 --  --  --      .Body Fluid Pleural Fluid  10-08 @ 18:34   No growth to date.  --    polymorphonuclear leukocytes seen  No organisms seen  by cytocentrifuge      .Tissue Right 5th toe r/o osteomyeltis   @ 13:54   No growth at 5 days  --    No polymorphonuclear cells seen per low power field  No organisms seen per oil power field      .Blood Blood-Venous   @ 10:28   No Growth Final  --  --      .Surgical Swab right foot wound   @ 21:42   Culture yields >4 types of aerobic and/or anaerobic bacteria  Call client services within 7 days if further workup is clinically  indicated. Culture includes  Rare Aeromonas hydrophila/caviae  Few Klebsiella oxytoca/Raoutella ornithinolytica  Few Enterococcus faecalis  Few Streptococcus agalactiae (Group B) isolated  Group B streptococci are susceptible to ampicillin,  penicillin and cefazolin, but may be resistant to  erythromycin and clindamycin.  Recommendations for intrapartum prophylaxis for Group B  streptococci are penicillin or ampicillin.  --  Aeromonas hydrophila/caviae  Klebsiella oxytoca /Raoutella ornithinolytica  Enterococcus faecalis      Bone R 5th metatarsal bone clean ma   @ 03:59   No growth at 5 days  --    No polymorphonuclear leukocytes seen per low power field  No organisms seen per oil power field      .Blood Blood-Venous   @ 21:09   No Growth Final  --  --      .Surgical Swab Right foot plantar wound   @ 21:08   Moderate Streptococcus agalactiae (Group B) isolated  Group B streptococci are susceptible to ampicillin,  penicillin and cefazolin, but may be resistant to  erythromycin and clindamycin.  Recommendations for intrapartum prophylaxis for Group B  streptococci are penicillin or ampicillin.  Moderate Bacteroides fragilis "Susceptibilities not performed"  Normal skin filiberto isolated  --  --          MEDICATIONS:    MEDICATIONS  (STANDING):  aspirin  chewable 81 milliGRAM(s) Oral daily  atorvastatin 80 milliGRAM(s) Oral at bedtime  chlorhexidine 0.12% Liquid 15 milliLiter(s) Oral Mucosa every 12 hours  chlorhexidine 2% Cloths 1 Application(s) Topical <User Schedule>  cyanocobalamin 1000 MICROGram(s) Oral daily  ergocalciferol 95057 Unit(s) Oral <User Schedule>  ferrous    sulfate Liquid 300 milliGRAM(s) Enteral Tube daily  finasteride 5 milliGRAM(s) Oral daily  heparin  Infusion.  Unit(s)/Hr (16 mL/Hr) IV Continuous <Continuous>  insulin lispro (ADMELOG) corrective regimen sliding scale   SubCutaneous every 6 hours  meropenem  IVPB 1000 milliGRAM(s) IV Intermittent every 12 hours  metoprolol tartrate 12.5 milliGRAM(s) Oral two times a day  pantoprazole  Injectable 40 milliGRAM(s) IV Push at bedtime  potassium phosphate IVPB 15 milliMole(s) IV Intermittent once  propofol Infusion 30 MICROgram(s)/kG/Min (15.6 mL/Hr) IV Continuous <Continuous>  vancomycin  IVPB 1500 milliGRAM(s) IV Intermittent daily      MEDICATIONS  (PRN):  ondansetron Injectable 4 milliGRAM(s) IV Push three times a day PRN Nausea and/or Vomiting  sodium chloride 0.9% lock flush 10 milliLiter(s) IV Push every 1 hour PRN Pre/post blood products, medications, blood draw, and to maintain line patency      REVIEW OF SYSTEMS:                           ALL ROS DONE [ X   ]    CONSTITUTIONAL:  LETHARGIC [   ], FEVER [   ], UNRESPONSIVE [   ]  CVS:  CP  [   ], SOB, [   ], PALPITATIONS [   ], DIZZYNESS [   ]  RS: COUGH [   ], SPUTUM [   ]  GI: ABDOMINAL PAIN [   ], NAUSEA [   ], VOMITINGS [   ], DIARRHEA [   ], CONSTIPATION [   ]  :  DYSURIA [   ], NOCTURIA [   ], INCREASED FREQUENCY [   ], DRIBLING [   ],  SKELETAL: PAINFUL JOINTS [   ], SWOLLEN JOINTS [   ], NECK ACHE [   ], LOW BACK ACHE [   ],  SKIN : ULCERS [   ], RASH [   ], ITCHING [   ]  CNS: HEAD ACHE [   ], DOUBLE VISION [   ], BLURRED VISION [   ], AMS / CONFUSION [   ], SEIZURES [   ], WEAKNESS [   ],TINGLING / NUMBNESS [   ]    PHYSICAL EXAMINATION:  GENERAL APPEARANCE: NO DISTRESS  HEENT:  NO PALLOR, NO  JVD,  NO   NODES, NECK SUPPLE  CVS: S1 +, S2 +,   RS: AEEB,  OCCASIONAL  RALES +,   NO RONCHI, CRACKLES +   ET+  ABD: SOFT, NT, NO, BS +       EXT: NO PE  SKIN: WARM,   RIGHT FOOT WRAPPED W/ WOUND VAC IN PLACE   ,   CVC+  ,   CT + [RIGH] ATTACHED TO PLEUROVAC  SKELETAL:  ROM ACCEPTABLE  CNS:  AAO X  0    RADIOLOGY :    EXAM:  CT ABDOMEN AND PELVIS OC                            PROCEDURE DATE:  2021          INTERPRETATION:  CLINICAL INFORMATION: Small bowel obstruction.    COMPARISON: None.    CONTRAST/COMPLICATIONS:  IV Contrast: NONE  Oral Contrast: Omnipaque 300  Complications: None reported at time of study completion    PROCEDURE:  CT of the Abdomen and Pelvis was performed.  Sagittal and coronal reformats were performed.    FINDINGS:  LOWER CHEST: Small bilateral pleural effusions with compressiveatelectasis of both lower lobes.    LIVER: Within normal limits.  BILE DUCTS: Normal caliber.  GALLBLADDER: Distended. Small layering gallstones.  SPLEEN: Within normal limits.  PANCREAS: Within normal limits.  ADRENALS: Within normal limits.  KIDNEYS/URETERS: No hydronephrosis. Nonobstructing left upper pole calculus, measuring 0.6 cm.    BLADDER: Urinary bladder contains air and a Castañeda catheter balloon.  REPRODUCTIVE ORGANS: Prostate is not enlarged.    BOWEL: No bowel obstruction. Appendix isnormal. Colonic diverticulosis.  PERITONEUM: Mild presacral fluid.  VESSELS: Atherosclerotic changes.  RETROPERITONEUM/LYMPH NODES: No lymphadenopathy.  ABDOMINAL WALL: Within normal limits.  BONES: Degenerative changes. Sternotomy.    IMPRESSION:  No acute intra-abdominal pathology.    Small bilateral pleural effusions with compressive atelectasis of both lower lobes.    ====================================================    EXAM:  MR FOOT RT                            PROCEDURE DATE:  2021          INTERPRETATION:  Clinical Information: Recent fifth metatarsal head resection now with fifth digit pain, swelling and foul discharge.    Comparison: Radiographs of the right foot from 2021 and MRI the right foot from 2021.    Technique:  MRI of the right midfoot and forefoot.  Intravenous Contrast: None.    Findings:    There is a resection at the mid aspect of the fifth metatarsal shaft. There is a lateral soft tissue wound beginning at the level of the amputation which extends distally. There is susceptibility artifact in the region of the amputation consistent with postoperative change. There is hyperintense T2 marrow signal throughout the remaining fifth metatarsal and within the adjacent fourth metatarsal head which is nonspecific and could be related to recent postoperative changes although osteomyelitis is suspected.    There is edema and mild atrophy within the plantar muscles of the foot. There is minimal spurring at the first metatarsophalangeal and hallux sesamoid articulations.    Impression:  Resection at the mid aspect of the fifth metatarsal. Lateral soft tissue wound with marrow signal abnormality throughout the fifth metatarsal and within the adjacent fourth metatarsal head which is nonspecific in the setting of recent surgery although osteomyelitis is suspected.        ASSESSMENT :     Headache    HTN (hypertension)    HLD (hyperlipidemia)    DM (diabetes mellitus)    CAD (coronary artery disease)    Glaucoma, angle-closure    [place] (hard of hearing)    No significant past surgical history    S/P CABG (coronary artery bypass graft)    History of thyroid surgery        PLAN:  HPI:  76M from home, lives with daughter w/ PMH DM on insulin, HTN, HLD, CAD, PSH R partial 5th ray amputation 2021 p/w R foot pain x1 day associated with foul smelling discharge. Pt with sharp pain on the R lateral foot. As per daughter, pt was taking tylenol which did not help his pain prompting the patient to ask his daughter to take him to the hospital. Pt is a poor historian. Daughter states that the patient did not see his podiatrist after the surgery. No fever, chest, pain, palpitations, nausea, vomiting, diarrhea (17 Sep 2021 01:11)    # TRANSFERRED TO ICU FOR WORSENING HYPOXIA AND DYSPNEA    # SUSPECT RIGHT FOOT OSTEOMYELITIS S/P RIGHT 5TH RAY RESECTION [] AND S/P DEBRIDEMENT, GRAFT APPLICATION, BONE BX AND WOUND VAC APPLICATION   ** RECENT ADMISSION FOR RIGHT DIABETIC FOOT ULCER, CELLULITIS, OSTEOMYELITIS RIGHT 5th METATARSAL S/P RIGHT 5TH PARTIAL RAY AMPUTATION [] - BONE PATHOLOGY W/ CLEAN MARGINS    - S/P VANCOMYCIN AND ZOSYN ; NOW ON UNASYN AND LEVAQUIN GIVEN OREN; PER ID SWITCH TO ZOSYN UNTIL 11/3  - RECENTLY HAD SIMON W/ MILD PAD  - REVIEWED WOUND CX [ ENTEROCOCCUS, AEROMONAS, KLEBSIELLA] AND BCX  - BONE BX - acute osteomyelitis and necrotic periosteal tissue.   - ID CONSULT, PODIATRY CONSULT    - PICC LINE PLACED TODAY TO COMPLETE 6 WEEKS OF ANTIBIOTICS - PER ID SWITCH TO ZOSYN UNTIL 11/3    # ACUTE HYPOXIC RESPIRATORY FAILURE SUSPECT S/T PULMONARY EDEMA IN THE SETTING OF SEVERE AORTIC STENOSIS + ASPIRATION PNA; COMBINED SYSTOLIC + DIASTOLIC HF - S/P CT TUBE PLACEMENT [SET TO PLEUROVAC] - SWITCHED FROM LEVAQUIN TO VANCOMYCIN + MEROPENEM, LASIX, CARDIOLOGY CONSULT IN PROGRESS  - CRITICAL CARE CONSULT  - ASPIRATION EVENT WHEN PATIENT REMOVED NGT   - S/P LASIX ON 10/1 - 10/2, 10/4 AND 10/5  - CT CHEST W/ BILATERAL PLEURAL EFFUSIONS [10/5] - PULMONOLOGY CONSULT FOR POSSIBLE THORACENTESIS & WILL CONTINUE LASIX   - ALSO F/U REPEAT ECHOCARDIOGRAM  - CT SURGERY WAS CONSULTED BUT UNABLE TO PLACE PIGTAIL, THUS IR CONSULT IN PROGRESS FOR PIGTAIL PLACEMENT  - CHEST TUBE PLACED [10/8]  - NOTED REPEAT ECHO    # SHOCK - ? S/T HYPOVOLEMIA VS. SEPSIS - W/ CVC [SWITCHED FROM FEMORAL TO LEFT IJ], S/P VASOPRESSORS - SWITCHED TO VANCOMYCIN AND MEROPENEM  - F/U BCX, F/U UA  - ID CONSULT IN PROGRESS    # TRANSIENT NEW ONSET A.FIB, PAROXYSMAL - MONITORING ON CARDIAC MONITOR, HEPARIN GTT, CARDIOLOGY CONSULT IN PROGRESS    # FUNGURIA - D/C FLUCONAZOLE GIVEN TRANSAMINITIS    # TRANSAMINITIS - SUSPECT THIS IS ISCHEMIC HEPATITIS - IMPROVING - HEPATOLOGY CONSULT IN PROGRESS    # BOWEL DISTENTION - ? ILEUS - OPTIMIZING ELECTROLYTES - IMPROVED  - SURGERY CONSULT IN PROGRESS  - PASSED 2 BMS ON     # CHOLELITHIASIS - TRENDING LFTS, RUQ U/S - CHOLELITHIASIS, SURGERY CONSULT IN PROGRESS  - GI CONSULT IN PROGRESS - LESS SUSPICIOUS FOR CHOLECYSTITIS    # DYSPEPSIA - PLACED ON PPI BID WITH IMPROVEMENT, UNDERWENT ST EVAL     # ELEVATED TROPONINS - NSTEMI - ? DEMAND - WILL NEED ISCHEMIC EVAL ONCE ACUTE INFECTION RESOLVES PER CARDIOLOGY, CARDIOLOGY CONSULT IN PROGRESS  - ON ASA, STATIN, BB    # OREN ON CKD - S/T ATN AND URINARY RETENTION - S/P IVF, NOW W/ CASTAÑEDA, NEPHROLOGY CONUSLT IN PROGRESS  - ON FLOMAX, ADDED FINASTERIDE    - WITH WORSENING RENAL FXN - NOTED URINARY RETENTION [10/4] - REPLACED CASTAÑEDA    # MEDICAL CLEARANCE FOR SURGERY - RCRI - 2 - 10.1 % 30 DAY RISK OF DEATH, MI OR CARDIAC ARREST  - CARDIOLOGY CONSULT     # DM -  HBA1C - 11  - LANTUS, SSI + FS    # CAD S/P CABG - PLACED ON ASA, STATIN, BB  - ECHO - SEVERE AORTIC STENOSIS, SEVERE CONCENTRIC LVH, G1DD, MILD MD  - CARDIOLOGY CONSULT - DR. BASSETT   - MAY NEED ISCHEMIC EVAL ONCE ACUTE ILLNESS RESOLVES    # HTN - ON METOPROLOL, NICARDIPINE    # SEVERE, ASYMPTOMATIC, STENOSIS - ONCE ACUTE ILLNESS RESOLVES, WILL LIKELY NEED ISCHEMIC WORKUP, SABIHA TO EVALUATE FURTHER. D/W PATIENT, DAUGHTER AND CARDIOLOGY TEAM [PREVIOUSLY SAW DR. BASSETT]    # VITAMIN D DEFICIENCY - ON CHOLECALCIFEROL    # HLD - STATIN    # CASE DISCUSSED AT LENGTH WITH PATIENT AND DAUGHTER, BIRDIE ROBERT AT BEDSIDE AND VIA PHONE @ [phone] [10/5] - CASE DICUSSED AT LENGTH AND ALL QUESTIONS WERE ANSWERED. DAUGHTER UNDERSTOOD THAT PROGNOSIS IS GUARDED.  # PATIENT AND DAUGHTER REFUSED [facility] ON PREVIOUS ADMISSION, PREVIOUSLY WISHED FOR PATIENT RETURN HOME W/ HOME PT; HOWEVER NOW, DAUGHTER WISHES FOR PATIENT TO GO TO [facility], AS PATIENT'S WIFE IS CURRENTLY ADMITTED THERE    # GI AND DVT PPX   Patient is a 76y old  Male who presents with a chief complaint of R Foot Pain (12 Oct 2021 09:42)    PATIENT IS SEEN AND EXAMINED IN MEDICAL FLOOR.    ALLERGIES:  No Known Allergies      Daily     Daily Weight in k.7 (12 Oct 2021 08:00)    VITALS:    Vital Signs Last 24 Hrs  T(C): 36.3 (12 Oct 2021 08:00), Max: 37.3 (11 Oct 2021 23:01)  T(F): 97.3 (12 Oct 2021 08:00), Max: 99.2 (11 Oct 2021 23:01)  HR: 89 (12 Oct 2021 09:16) (73 - 103)  BP: 140/63 (12 Oct 2021 09:00) (106/49 - 148/72)  BP(mean): 81 (12 Oct 2021 09:00) (61 - 90)  RR: 16 (12 Oct 2021 09:00) (12 - 20)  SpO2: 100% (12 Oct 2021 09:16) (79% - 100%)    LABS:    CBC Full  -  ( 12 Oct 2021 04:34 )  WBC Count : 4.82 K/uL  RBC Count : 2.62 M/uL  Hemoglobin : 7.8 g/dL  Hematocrit : 24.9 %  Platelet Count - Automated : 166 K/uL  Mean Cell Volume : 95.0 fl  Mean Cell Hemoglobin : 29.8 pg  Mean Cell Hemoglobin Concentration : 31.3 gm/dL  Auto Neutrophil # : x  Auto Lymphocyte # : x  Auto Monocyte # : x  Auto Eosinophil # : x  Auto Basophil # : x  Auto Neutrophil % : x  Auto Lymphocyte % : x  Auto Monocyte % : x  Auto Eosinophil % : x  Auto Basophil % : x    PT/INR - ( 11 Oct 2021 05:43 )   PT: 14.7 sec;   INR: 1.25 ratio         PTT - ( 12 Oct 2021 04:34 )  PTT:91.7 sec  1012    147<H>  |  114<H>  |  24<H>  ----------------------------<  135<H>  3.8   |  28  |  1.43<H>    Ca    8.3<L>      12 Oct 2021 04:34  Phos  2.3     10-12  Mg     2.5     10-12    TPro  6.2  /  Alb  2.0<L>  /  TBili  0.4  /  DBili  x   /  AST  137<H>  /  ALT  252<H>  /  AlkPhos  155<H>  10    CAPILLARY BLOOD GLUCOSE      POCT Blood Glucose.: 180 mg/dL (12 Oct 2021 00:23)  POCT Blood Glucose.: 175 mg/dL (11 Oct 2021 16:09)  POCT Blood Glucose.: 178 mg/dL (11 Oct 2021 10:21)        LIVER FUNCTIONS - ( 12 Oct 2021 04:34 )  Alb: 2.0 g/dL / Pro: 6.2 g/dL / ALK PHOS: 155 U/L / ALT: 252 U/L DA / AST: 137 U/L / GGT: x           Creatinine Trend: 1.43<--, 1.62<--, 1.95<--, 2.45<--, 3.03<--, 2.12<--  I&O's Summary    11 Oct 2021 07:01  -  12 Oct 2021 07:00  --------------------------------------------------------  IN: 2235.4 mL / OUT: 1535 mL / NET: 700.4 mL        ABG - ( 12 Oct 2021 04:32 )  pH, Arterial: 7.45  pH, Blood: x     /  pCO2: 39    /  pO2: 141   / HCO3: 27    / Base Excess: 3.0   /  SaO2: 100                 .Body Fluid Pleural Fluid  10-08 @ 18:51 --  --  --      .Body Fluid Pleural Fluid  10-08 @ 18:34   No growth to date.  --    polymorphonuclear leukocytes seen  No organisms seen  by cytocentrifuge      .Tissue Right 5th toe r/o osteomyeltis   @ 13:54   No growth at 5 days  --    No polymorphonuclear cells seen per low power field  No organisms seen per oil power field      .Blood Blood-Venous   @ 10:28   No Growth Final  --  --      .Surgical Swab right foot wound   @ 21:42   Culture yields >4 types of aerobic and/or anaerobic bacteria  Call client services within 7 days if further workup is clinically  indicated. Culture includes  Rare Aeromonas hydrophila/caviae  Few Klebsiella oxytoca/Raoutella ornithinolytica  Few Enterococcus faecalis  Few Streptococcus agalactiae (Group B) isolated  Group B streptococci are susceptible to ampicillin,  penicillin and cefazolin, but may be resistant to  erythromycin and clindamycin.  Recommendations for intrapartum prophylaxis for Group B  streptococci are penicillin or ampicillin.  --  Aeromonas hydrophila/caviae  Klebsiella oxytoca /Raoutella ornithinolytica  Enterococcus faecalis      Bone R 5th metatarsal bone clean ma   @ 03:59   No growth at 5 days  --    No polymorphonuclear leukocytes seen per low power field  No organisms seen per oil power field      .Blood Blood-Venous   @ 21:09   No Growth Final  --  --      .Surgical Swab Right foot plantar wound   @ 21:08   Moderate Streptococcus agalactiae (Group B) isolated  Group B streptococci are susceptible to ampicillin,  penicillin and cefazolin, but may be resistant to  erythromycin and clindamycin.  Recommendations for intrapartum prophylaxis for Group B  streptococci are penicillin or ampicillin.  Moderate Bacteroides fragilis "Susceptibilities not performed"  Normal skin filiberto isolated  --  --          MEDICATIONS:    MEDICATIONS  (STANDING):  aspirin  chewable 81 milliGRAM(s) Oral daily  atorvastatin 80 milliGRAM(s) Oral at bedtime  chlorhexidine 0.12% Liquid 15 milliLiter(s) Oral Mucosa every 12 hours  chlorhexidine 2% Cloths 1 Application(s) Topical <User Schedule>  cyanocobalamin 1000 MICROGram(s) Oral daily  ergocalciferol 18047 Unit(s) Oral <User Schedule>  ferrous    sulfate Liquid 300 milliGRAM(s) Enteral Tube daily  finasteride 5 milliGRAM(s) Oral daily  heparin  Infusion.  Unit(s)/Hr (16 mL/Hr) IV Continuous <Continuous>  insulin lispro (ADMELOG) corrective regimen sliding scale   SubCutaneous every 6 hours  meropenem  IVPB 1000 milliGRAM(s) IV Intermittent every 12 hours  metoprolol tartrate 12.5 milliGRAM(s) Oral two times a day  pantoprazole  Injectable 40 milliGRAM(s) IV Push at bedtime  potassium phosphate IVPB 15 milliMole(s) IV Intermittent once  propofol Infusion 30 MICROgram(s)/kG/Min (15.6 mL/Hr) IV Continuous <Continuous>  vancomycin  IVPB 1500 milliGRAM(s) IV Intermittent daily      MEDICATIONS  (PRN):  ondansetron Injectable 4 milliGRAM(s) IV Push three times a day PRN Nausea and/or Vomiting  sodium chloride 0.9% lock flush 10 milliLiter(s) IV Push every 1 hour PRN Pre/post blood products, medications, blood draw, and to maintain line patency      REVIEW OF SYSTEMS:                           ALL ROS DONE [ X   ]    CONSTITUTIONAL:  LETHARGIC [   ], FEVER [   ], UNRESPONSIVE [   ]  CVS:  CP  [   ], SOB, [   ], PALPITATIONS [   ], DIZZYNESS [   ]  RS: COUGH [   ], SPUTUM [   ]  GI: ABDOMINAL PAIN [   ], NAUSEA [   ], VOMITINGS [   ], DIARRHEA [   ], CONSTIPATION [   ]  :  DYSURIA [   ], NOCTURIA [   ], INCREASED FREQUENCY [   ], DRIBLING [   ],  SKELETAL: PAINFUL JOINTS [   ], SWOLLEN JOINTS [   ], NECK ACHE [   ], LOW BACK ACHE [   ],  SKIN : ULCERS [   ], RASH [   ], ITCHING [   ]  CNS: HEAD ACHE [   ], DOUBLE VISION [   ], BLURRED VISION [   ], AMS / CONFUSION [   ], SEIZURES [   ], WEAKNESS [   ],TINGLING / NUMBNESS [   ]    PHYSICAL EXAMINATION:  GENERAL APPEARANCE: NO DISTRESS  HEENT:  NO PALLOR, NO  JVD,  NO   NODES, NECK SUPPLE  CVS: S1 +, S2 +,   RS: AEEB,  OCCASIONAL  RALES +,   NO RONCHI, CRACKLES +   ET+  ABD: SOFT, NT, NO, BS +       EXT: NO PE  SKIN: WARM,   RIGHT FOOT WRAPPED W/ WOUND VAC IN PLACE   ,   CVC+  ,   CT + [RIGH] ATTACHED TO PLEUROVAC  SKELETAL:  ROM ACCEPTABLE  CNS:  AAO X  0    RADIOLOGY :    EXAM:  CT ABDOMEN AND PELVIS OC                            PROCEDURE DATE:  2021          INTERPRETATION:  CLINICAL INFORMATION: Small bowel obstruction.    COMPARISON: None.    CONTRAST/COMPLICATIONS:  IV Contrast: NONE  Oral Contrast: Omnipaque 300  Complications: None reported at time of study completion    PROCEDURE:  CT of the Abdomen and Pelvis was performed.  Sagittal and coronal reformats were performed.    FINDINGS:  LOWER CHEST: Small bilateral pleural effusions with compressiveatelectasis of both lower lobes.    LIVER: Within normal limits.  BILE DUCTS: Normal caliber.  GALLBLADDER: Distended. Small layering gallstones.  SPLEEN: Within normal limits.  PANCREAS: Within normal limits.  ADRENALS: Within normal limits.  KIDNEYS/URETERS: No hydronephrosis. Nonobstructing left upper pole calculus, measuring 0.6 cm.    BLADDER: Urinary bladder contains air and a Castañeda catheter balloon.  REPRODUCTIVE ORGANS: Prostate is not enlarged.    BOWEL: No bowel obstruction. Appendix isnormal. Colonic diverticulosis.  PERITONEUM: Mild presacral fluid.  VESSELS: Atherosclerotic changes.  RETROPERITONEUM/LYMPH NODES: No lymphadenopathy.  ABDOMINAL WALL: Within normal limits.  BONES: Degenerative changes. Sternotomy.    IMPRESSION:  No acute intra-abdominal pathology.    Small bilateral pleural effusions with compressive atelectasis of both lower lobes.    ====================================================    EXAM:  MR FOOT RT                            PROCEDURE DATE:  2021          INTERPRETATION:  Clinical Information: Recent fifth metatarsal head resection now with fifth digit pain, swelling and foul discharge.    Comparison: Radiographs of the right foot from 2021 and MRI the right foot from 2021.    Technique:  MRI of the right midfoot and forefoot.  Intravenous Contrast: None.    Findings:    There is a resection at the mid aspect of the fifth metatarsal shaft. There is a lateral soft tissue wound beginning at the level of the amputation which extends distally. There is susceptibility artifact in the region of the amputation consistent with postoperative change. There is hyperintense T2 marrow signal throughout the remaining fifth metatarsal and within the adjacent fourth metatarsal head which is nonspecific and could be related to recent postoperative changes although osteomyelitis is suspected.    There is edema and mild atrophy within the plantar muscles of the foot. There is minimal spurring at the first metatarsophalangeal and hallux sesamoid articulations.    Impression:  Resection at the mid aspect of the fifth metatarsal. Lateral soft tissue wound with marrow signal abnormality throughout the fifth metatarsal and within the adjacent fourth metatarsal head which is nonspecific in the setting of recent surgery although osteomyelitis is suspected.        ASSESSMENT :     Headache    HTN (hypertension)    HLD (hyperlipidemia)    DM (diabetes mellitus)    CAD (coronary artery disease)    Glaucoma, angle-closure    [place] (hard of hearing)    No significant past surgical history    S/P CABG (coronary artery bypass graft)    History of thyroid surgery        PLAN:  HPI:  76M from home, lives with daughter w/ PMH DM on insulin, HTN, HLD, CAD, PSH R partial 5th ray amputation 2021 p/w R foot pain x1 day associated with foul smelling discharge. Pt with sharp pain on the R lateral foot. As per daughter, pt was taking tylenol which did not help his pain prompting the patient to ask his daughter to take him to the hospital. Pt is a poor historian. Daughter states that the patient did not see his podiatrist after the surgery. No fever, chest, pain, palpitations, nausea, vomiting, diarrhea (17 Sep 2021 01:11)    # TRANSFERRED TO ICU FOR WORSENING HYPOXIA AND DYSPNEA    # SUSPECT RIGHT FOOT OSTEOMYELITIS S/P RIGHT 5TH RAY RESECTION [] AND S/P DEBRIDEMENT, GRAFT APPLICATION, BONE BX AND WOUND VAC APPLICATION   ** RECENT ADMISSION FOR RIGHT DIABETIC FOOT ULCER, CELLULITIS, OSTEOMYELITIS RIGHT 5th METATARSAL S/P RIGHT 5TH PARTIAL RAY AMPUTATION [] - BONE PATHOLOGY W/ CLEAN MARGINS    - S/P VANCOMYCIN AND ZOSYN ; NOW ON UNASYN AND LEVAQUIN GIVEN OREN; PER ID SWITCH TO ZOSYN UNTIL 11/3  - RECENTLY HAD SIMON W/ MILD PAD  - REVIEWED WOUND CX [ ENTEROCOCCUS, AEROMONAS, KLEBSIELLA] AND BCX  - BONE BX - acute osteomyelitis and necrotic periosteal tissue.   - ID CONSULT, PODIATRY CONSULT    - PICC LINE PLACED TO COMPLETE 6 WEEKS OF ANTIBIOTICS - PER ID SWITCH TO ZOSYN UNTIL 11/3    # ACUTE HYPOXIC RESPIRATORY FAILURE SUSPECT S/T PULMONARY EDEMA IN THE SETTING OF SEVERE AORTIC STENOSIS + ASPIRATION PNA; COMBINED SYSTOLIC + DIASTOLIC HF - S/P CT TUBE PLACEMENT [SET TO PLEUROVAC] - SWITCHED FROM LEVAQUIN TO VANCOMYCIN + MEROPENEM, LASIX, CARDIOLOGY CONSULT IN PROGRESS  - CRITICAL CARE CONSULT  - ASPIRATION EVENT WHEN PATIENT REMOVED NGT   - S/P LASIX ON 10/1 - 10/2, 10/4 AND 10/5  - CT CHEST W/ BILATERAL PLEURAL EFFUSIONS [10/5] - PULMONOLOGY CONSULT FOR POSSIBLE THORACENTESIS & WILL CONTINUE LASIX   - ALSO F/U REPEAT ECHOCARDIOGRAM  - CT SURGERY WAS CONSULTED BUT UNABLE TO PLACE PIGTAIL, THUS IR CONSULT IN PROGRESS FOR PIGTAIL PLACEMENT  - CHEST TUBE PLACED [10/8]  - NOTED REPEAT ECHO    # SHOCK - ? S/T HYPOVOLEMIA VS. SEPSIS - W/ CVC [SWITCHED FROM FEMORAL TO LEFT IJ], S/P VASOPRESSORS - SWITCHED TO VANCOMYCIN AND MEROPENEM  - F/U BCX  - ID CONSULT IN PROGRESS    # TRANSIENT NEW ONSET A.FIB, PAROXYSMAL - MONITORING ON CARDIAC MONITOR, HEPARIN GTT, CARDIOLOGY CONSULT IN PROGRESS    # FUNGURIA - D/C FLUCONAZOLE GIVEN TRANSAMINITIS    # TRANSAMINITIS - SUSPECT THIS IS ISCHEMIC HEPATITIS - IMPROVING - HEPATOLOGY CONSULT IN PROGRESS    # BOWEL DISTENTION - ? ILEUS - OPTIMIZING ELECTROLYTES - IMPROVED  - SURGERY CONSULT IN PROGRESS  - PASSED 2 BMS ON     # CHOLELITHIASIS - TRENDING LFTS, RUQ U/S - CHOLELITHIASIS, SURGERY CONSULT IN PROGRESS  - GI CONSULT IN PROGRESS - LESS SUSPICIOUS FOR CHOLECYSTITIS    # DYSPEPSIA - PLACED ON PPI BID WITH IMPROVEMENT, UNDERWENT ST EVAL     # ELEVATED TROPONINS - NSTEMI - ? DEMAND - WILL NEED ISCHEMIC EVAL ONCE ACUTE INFECTION RESOLVES PER CARDIOLOGY, CARDIOLOGY CONSULT IN PROGRESS  - ON ASA, STATIN, BB    # OREN ON CKD - S/T ATN AND URINARY RETENTION - S/P IVF, NOW W/ CASTAÑEDA, NEPHROLOGY CONUSLT IN PROGRESS  - ON FLOMAX, ADDED FINASTERIDE    - WITH WORSENING RENAL FXN - NOTED URINARY RETENTION [10/4] - REPLACED CASTAÑEDA    # MEDICAL CLEARANCE FOR SURGERY - RCRI - 2 - 10.1 % 30 DAY RISK OF DEATH, MI OR CARDIAC ARREST  - CARDIOLOGY CONSULT     # DM -  HBA1C - 11  - LANTUS, SSI + FS    # CAD S/P CABG - PLACED ON ASA, STATIN, BB  - ECHO - SEVERE AORTIC STENOSIS, SEVERE CONCENTRIC LVH, G1DD, MILD NH  - CARDIOLOGY CONSULT - DR. BASSETT   - MAY NEED ISCHEMIC EVAL ONCE ACUTE ILLNESS RESOLVES INCLUDING CARDIAC CATHETERIZATION    # HTN - ON METOPROLOL, NICARDIPINE    # SEVERE, ASYMPTOMATIC, STENOSIS - ONCE ACUTE ILLNESS RESOLVES, WILL LIKELY NEED ISCHEMIC WORKUP, SABIHA TO EVALUATE FURTHER. D/W PATIENT, DAUGHTER AND CARDIOLOGY TEAM [PREVIOUSLY SAW DR. BASSETT]    # VITAMIN D DEFICIENCY - ON CHOLECALCIFEROL    # HLD - STATIN    # CASE DISCUSSED AT LENGTH WITH PATIENT AND DAUGHTER, BIRDIE YESICA AT BEDSIDE AND VIA PHONE @ [phone] [10/5] - CASE DICUSSED AT LENGTH AND ALL QUESTIONS WERE ANSWERED. DAUGHTER UNDERSTOOD THAT PROGNOSIS IS GUARDED.  # PATIENT AND DAUGHTER REFUSED [facility] ON PREVIOUS ADMISSION, PREVIOUSLY WISHED FOR PATIENT RETURN HOME W/ HOME PT; HOWEVER NOW, DAUGHTER WISHES FOR PATIENT TO GO TO [facility], AS PATIENT'S WIFE IS CURRENTLY ADMITTED THERE    # GI AND DVT PPX

## 2021-10-12 NOTE — PROGRESS NOTE ADULT - SUBJECTIVE AND OBJECTIVE BOX
Chief Complaint:  Patient is a 76y old  Male who presents with a chief complaint of R Foot Pain (12 Oct 2021 14:03)      Reason for consult:    Interval Events:     Hospital Medications:  aspirin  chewable 81 milliGRAM(s) Oral daily  atorvastatin 80 milliGRAM(s) Oral at bedtime  chlorhexidine 0.12% Liquid 15 milliLiter(s) Oral Mucosa every 12 hours  chlorhexidine 2% Cloths 1 Application(s) Topical <User Schedule>  cyanocobalamin 1000 MICROGram(s) Oral daily  ergocalciferol 61253 Unit(s) Oral <User Schedule>  ferrous    sulfate Liquid 300 milliGRAM(s) Enteral Tube daily  finasteride 5 milliGRAM(s) Oral daily  heparin  Infusion.  Unit(s)/Hr IV Continuous <Continuous>  insulin lispro (ADMELOG) corrective regimen sliding scale   SubCutaneous every 6 hours  meropenem  IVPB 1000 milliGRAM(s) IV Intermittent every 12 hours  metoprolol tartrate 12.5 milliGRAM(s) Oral two times a day  ondansetron Injectable 4 milliGRAM(s) IV Push three times a day PRN  pantoprazole  Injectable 40 milliGRAM(s) IV Push at bedtime  sodium chloride 0.9% lock flush 10 milliLiter(s) IV Push every 1 hour PRN  vancomycin  IVPB 1500 milliGRAM(s) IV Intermittent daily      ROS:   General:  No  fevers, chills, night sweats, fatigue  Eyes:  Good vision, no reported pain  ENT:  No sore throat, pain, runny nose  CV:  No pain, palpitations  Pulm:  No dyspnea, cough  GI:  See HPI, otherwise negative  :  No  incontinence, nocturia  Muscle:  No pain, weakness  Neuro:  No memory problems  Psych:  No insomnia, mood problems, depression  Endocrine:  No polyuria, polydipsia, cold/heat intolerance  Heme:  No petechiae, ecchymosis, easy bruisability  Skin:  No rash    PHYSICAL EXAM:   Vital Signs:  Vital Signs Last 24 Hrs  T(C): 36.3 (12 Oct 2021 08:00), Max: 37.3 (11 Oct 2021 23:01)  T(F): 97.3 (12 Oct 2021 08:00), Max: 99.2 (11 Oct 2021 23:01)  HR: 90 (12 Oct 2021 14:00) (73 - 90)  BP: 143/64 (12 Oct 2021 14:00) (106/49 - 149/59)  BP(mean): 85 (12 Oct 2021 14:00) (61 - 86)  RR: 21 (12 Oct 2021 14:00) (14 - 22)  SpO2: 100% (12 Oct 2021 14:00) (79% - 100%)  Daily     Daily Weight in k.7 (12 Oct 2021 08:00)    GENERAL: no acute distress  NEURO: alert, no asterixis  HEENT: anicteric sclera, no conjunctival pallor appreciated  CHEST: no respiratory distress, no accessory muscle use  CARDIAC: regular rate, rhythm  ABDOMEN: soft, non-tender, non-distended, no rebound or guarding  EXTREMITIES: warm, well perfused, no edema  SKIN: no lesions noted    LABS: reviewed                        8.1    5.48  )-----------( 170      ( 12 Oct 2021 12:13 )             25.9     10-12    147<H>  |  114<H>  |  24<H>  ----------------------------<  135<H>  3.8   |  28  |  1.43<H>    Ca    8.3<L>      12 Oct 2021 04:34  Phos  2.3     10-12  Mg     2.5     10-12    TPro  6.2  /  Alb  2.0<L>  /  TBili  0.4  /  DBili  x   /  AST  137<H>  /  ALT  252<H>  /  AlkPhos  155<H>  10-12    LIVER FUNCTIONS - ( 12 Oct 2021 04:34 )  Alb: 2.0 g/dL / Pro: 6.2 g/dL / ALK PHOS: 155 U/L / ALT: 252 U/L DA / AST: 137 U/L / GGT: x             Interval Diagnostic Studies: see sunrise for full report   Chief Complaint:  Patient is a 76y old  Male who presents with a chief complaint of R Foot Pain (12 Oct 2021 14:03)      Reason for consult: Abnormal liver enzymes    Interval Events: Patient was seen and examined at bedside in ICU. Remains intubated, but not on pressor and again off sedation when examined. He opens eyes to loud verbal stimuli. Abdomen, RUQ does not appear to be tender.  Transaminases continued to improve. Heparin drip being held b/o bleeding from PICC.    Hospital Medications:  aspirin  chewable 81 milliGRAM(s) Oral daily  atorvastatin 80 milliGRAM(s) Oral at bedtime  chlorhexidine 0.12% Liquid 15 milliLiter(s) Oral Mucosa every 12 hours  chlorhexidine 2% Cloths 1 Application(s) Topical <User Schedule>  cyanocobalamin 1000 MICROGram(s) Oral daily  ergocalciferol 40950 Unit(s) Oral <User Schedule>  ferrous    sulfate Liquid 300 milliGRAM(s) Enteral Tube daily  finasteride 5 milliGRAM(s) Oral daily  heparin  Infusion.  Unit(s)/Hr IV Continuous <Continuous>  insulin lispro (ADMELOG) corrective regimen sliding scale   SubCutaneous every 6 hours  meropenem  IVPB 1000 milliGRAM(s) IV Intermittent every 12 hours  metoprolol tartrate 12.5 milliGRAM(s) Oral two times a day  ondansetron Injectable 4 milliGRAM(s) IV Push three times a day PRN  pantoprazole  Injectable 40 milliGRAM(s) IV Push at bedtime  sodium chloride 0.9% lock flush 10 milliLiter(s) IV Push every 1 hour PRN  vancomycin  IVPB 1500 milliGRAM(s) IV Intermittent daily      ROS:   Unable to obtain due to patient condition    PHYSICAL EXAM:   Vital Signs:  Vital Signs Last 24 Hrs  T(C): 36.3 (12 Oct 2021 08:00), Max: 37.3 (11 Oct 2021 23:01)  T(F): 97.3 (12 Oct 2021 08:00), Max: 99.2 (11 Oct 2021 23:01)  HR: 90 (12 Oct 2021 14:00) (73 - 90)  BP: 143/64 (12 Oct 2021 14:00) (106/49 - 149/59)  BP(mean): 85 (12 Oct 2021 14:00) (61 - 86)  RR: 21 (12 Oct 2021 14:00) (14 - 22)  SpO2: 100% (12 Oct 2021 14:00) (79% - 100%)  Daily     Daily Weight in k.7 (12 Oct 2021 08:00)    GENERAL: critically ill  NEURO: off sedation when at bedside; opens eyes for loud verbal stimuli, not following commands  HEENT: anicteric sclera, no conjunctival pallor appreciated  CHEST: intubated, on vent, with chest tube  CARDIAC: mildly tachycardic  ABDOMEN: soft, non-tender, non-distended, no rebound or guarding, BS+  EXTREMITIES: R foot wound  SKIN: Ecchymoses (on abdominal wall)      LABS: reviewed                        8.1    5.48  )-----------( 170      ( 12 Oct 2021 12:13 )             25.9     10-12    147<H>  |  114<H>  |  24<H>  ----------------------------<  135<H>  3.8   |  28  |  1.43<H>    Ca    8.3<L>      12 Oct 2021 04:34  Phos  2.3     10-12  Mg     2.5     10-12    TPro  6.2  /  Alb  2.0<L>  /  TBili  0.4  /  DBili  x   /  AST  137<H>  /  ALT  252<H>  /  AlkPhos  155<H>  10-12    LIVER FUNCTIONS - ( 12 Oct 2021 04:34 )  Alb: 2.0 g/dL / Pro: 6.2 g/dL / ALK PHOS: 155 U/L / ALT: 252 U/L DA / AST: 137 U/L / GGT: x             Interval Diagnostic Studies: see sunrise for full report

## 2021-10-12 NOTE — PROGRESS NOTE ADULT - SUBJECTIVE AND OBJECTIVE BOX
INTERVAL HPI/OVERNIGHT EVENTS: No acute events overnight. Patient noted to be bleeding from Left PICC line anticoagulation (Heparin gtt) stopped. Site where PICC is redressed, and no new bleeding noted. Patient is noted to be sedated and vent.     PRESSORS: [ ] YES [X] NO  WHICH:    ANTIBIOTICS:                  DATE STARTED:  ANTIBIOTICS:                  DATE STARTED:  ANTIBIOTICS:                  DATE STARTED:    Antimicrobial:  meropenem  IVPB 1000 milliGRAM(s) IV Intermittent every 12 hours  vancomycin  IVPB 1500 milliGRAM(s) IV Intermittent daily    Cardiovascular:  metoprolol tartrate 12.5 milliGRAM(s) Oral two times a day    Pulmonary:    Hematalogic:  aspirin  chewable 81 milliGRAM(s) Oral daily  heparin  Infusion.  Unit(s)/Hr IV Continuous <Continuous>    Other:  atorvastatin 80 milliGRAM(s) Oral at bedtime  chlorhexidine 0.12% Liquid 15 milliLiter(s) Oral Mucosa every 12 hours  chlorhexidine 2% Cloths 1 Application(s) Topical <User Schedule>  cyanocobalamin 1000 MICROGram(s) Oral daily  ergocalciferol 73461 Unit(s) Oral <User Schedule>  ferrous    sulfate Liquid 300 milliGRAM(s) Enteral Tube daily  finasteride 5 milliGRAM(s) Oral daily  insulin lispro (ADMELOG) corrective regimen sliding scale   SubCutaneous every 6 hours  ondansetron Injectable 4 milliGRAM(s) IV Push three times a day PRN  pantoprazole  Injectable 40 milliGRAM(s) IV Push at bedtime  potassium phosphate IVPB 15 milliMole(s) IV Intermittent once  propofol Infusion 30 MICROgram(s)/kG/Min IV Continuous <Continuous>  sodium chloride 0.9% lock flush 10 milliLiter(s) IV Push every 1 hour PRN      Drug Dosing Weight  Height (cm): 170.2 (17 Sep 2021 21:58)  Weight (kg): 86.9 (05 Oct 2021 21:45)  BMI (kg/m2): 30 (05 Oct 2021 21:45)  BSA (m2): 1.99 (05 Oct 2021 21:45)    CENTRAL LINE: [X] YES [] NO  LOCATION:   DATE INSERTED:  REMOVE: [ ] YES [ ] NO  EXPLAIN:    CASTAÑEDA: [X ] YES [ ] NO    DATE INSERTED:  REMOVE:  [ ] YES [ ] NO  EXPLAIN:    A-LINE:  [ ] YES [x ] NO  LOCATION:   DATE INSERTED:  REMOVE:  [ ] YES [ ] NO  EXPLAIN:    PMH/Social Hx/Fam Hx -reviewed admission note, no change since admission  PAST MEDICAL & SURGICAL HISTORY:  HTN (hypertension)    HLD (hyperlipidemia)    DM (diabetes mellitus)    CAD (coronary artery disease)    Glaucoma, angle-closure    Circle (hard of hearing)    S/P CABG (coronary artery bypass graft)    History of thyroid surgery        T(C): 36.3 (10-12-21 @ 08:00), Max: 37.3 (10-11-21 @ 23:01)  HR: 89 (10-12-21 @ 09:16)  BP: 140/63 (10-12-21 @ 09:00)  BP(mean): 81 (10-12-21 @ 09:00)  ABP: --  ABP(mean): --  RR: 16 (10-12-21 @ 09:00)  SpO2: 100% (10-12-21 @ 09:16)  Wt(kg): --    ABG - ( 12 Oct 2021 04:32 )  pH, Arterial: 7.45  pH, Blood: x     /  pCO2: 39    /  pO2: 141   / HCO3: 27    / Base Excess: 3.0   /  SaO2: 100                   10-11 @ 07:01  -  10-12 @ 07:00  --------------------------------------------------------  IN: 2235.4 mL / OUT: 1535 mL / NET: 700.4 mL        Mode: CPAP with PS  FiO2: 40  PEEP: 5  PS: 8  MAP: 7      PHYSICAL EXAM:  GENERAL: sedated and intubated.   HEAD: Nomocephalic, Atraumatic  EYES: nonreactive pupils   NECK: Supple, No JVD; Normal thyroid; Trachea midline  NERVOUS SYSTEM:  sedated   CHEST/LUNG: No rales, rhonchi, wheezing   HEART: Regular rate and rhythm; Grade III Systolic murmur noted in aortic region   ABDOMEN: Soft, Nontender, Nondistended; Bowel sounds present, Rectal tube noted   : Castañeda noted to be draining clear liquid   EXTREMITIES:  2+ Peripheral Pulses, No clubbing, cyanosis, or edema  SKIN: right toe digit amputation no bleeding noted.         LABS:  CBC Full  -  ( 12 Oct 2021 04:34 )  WBC Count : 4.82 K/uL  RBC Count : 2.62 M/uL  Hemoglobin : 7.8 g/dL  Hematocrit : 24.9 %  Platelet Count - Automated : 166 K/uL  Mean Cell Volume : 95.0 fl  Mean Cell Hemoglobin : 29.8 pg  Mean Cell Hemoglobin Concentration : 31.3 gm/dL  Auto Neutrophil # : x  Auto Lymphocyte # : x  Auto Monocyte # : x  Auto Eosinophil # : x  Auto Basophil # : x  Auto Neutrophil % : x  Auto Lymphocyte % : x  Auto Monocyte % : x  Auto Eosinophil % : x  Auto Basophil % : x    10-12    147<H>  |  114<H>  |  24<H>  ----------------------------<  135<H>  3.8   |  28  |  1.43<H>    Ca    8.3<L>      12 Oct 2021 04:34  Phos  2.3     10-12  Mg     2.5     10-12    TPro  6.2  /  Alb  2.0<L>  /  TBili  0.4  /  DBili  x   /  AST  137<H>  /  ALT  252<H>  /  AlkPhos  155<H>  10-12    PT/INR - ( 11 Oct 2021 05:43 )   PT: 14.7 sec;   INR: 1.25 ratio         PTT - ( 12 Oct 2021 04:34 )  PTT:91.7 sec        RADIOLOGY & ADDITIONAL STUDIES REVIEWED:      [ ]GOALS OF CARE DISCUSSION WITH PATIENT/FAMILY/PROXY:    CRITICAL CARE TIME SPENT: 35 minutes

## 2021-10-12 NOTE — PROGRESS NOTE ADULT - SUBJECTIVE AND OBJECTIVE BOX
[facility] NEPHROLOGY- PROGRESS NOTE    Patient is a 75yo Male with DM on insulin, HTN, HLD, CAD, s/p R partial 5th ray amputation 8/19/2021 p/w R foot pain and foul drainage a/w diabetic foot ulcer suspicious for osteomyelitis. s/p OR debridement today. Nephrology consulted for abrupt rise in SCr.   s/p ibrahim placement for distended bladder on 9/23 with ~400ml of urine o/p  9/27- pt with SOB; CXR with pulmonary edema- pt given Lasix 80mg IV x1. Concern for aspiration PNA  Course BP: s/p lasix 60mg IV on 10/1 & 10/2 and s/p Lasix 40mg IV x1 today 10/4. Bladder scan with 1L urine; s/p ibrahim reinserted  Transferred to ICU for acute hypoxic respiratory failure, s/p intubated on 10/7    Hospital Medications: Medications reviewed.  REVIEW OF SYSTEMS: Unable to obtain;  intubated    VITALS:  T(F): 97.3 (10-12-21 @ 08:00), Max: 99.2 (10-11-21 @ 23:01)  HR: 83 (10-12-21 @ 11:00)  BP: 140/75 (10-12-21 @ 11:00)  RR: 21 (10-12-21 @ 11:00)  SpO2: 100% (10-12-21 @ 11:00)  Wt(kg): --    10-11 @ 07:01  -  10-12 @ 07:00  --------------------------------------------------------  IN: 2235.4 mL / OUT: 1535 mL / NET: 700.4 mL        PHYSICAL EXAM:  Gen: unarousable (propofol held for SBT)  HEENT: +ETT  Cards: RRR, +S1/S2, +TRINIDAD  Resp: +mechanical BS, +rt pigtail catheter  GI: soft,   : +ibrahim  Extremities: no LE edema B/L  Derm: Rt foot wrapped with wound vac    LABS:  10-12    147<H>  |  114<H>  |  24<H>  ----------------------------<  135<H>  3.8   |  28  |  1.43<H>    Ca    8.3<L>      12 Oct 2021 04:34  Phos  2.3     10-12  Mg     2.5     10-12    TPro  6.2  /  Alb  2.0<L>  /  TBili  0.4  /  DBili      /  AST  137<H>  /  ALT  252<H>  /  AlkPhos  155<H>  10-12    Creatinine Trend: 1.43 <--, 1.62 <--, 1.95 <--, 2.45 <--, 3.03 <--, 2.12 <--, 2.02 <--, 2.11 <--                        7.8    4.82  )-----------( 166      ( 12 Oct 2021 04:34 )             24.9     Urine Studies:    Creatinine, Random Urine: 123 mg/dL (10-05 @ 12:43)  Chloride, Random Urine: <10 mmol/L (10-05 @ 12:43)  Sodium, Random Urine: 44 mmol/L (10-05 @ 12:43)

## 2021-10-12 NOTE — PROGRESS NOTE ADULT - ATTENDING COMMENTS
IMP: 76 yr old man from home, lives with daughter with DM- uncontrol  on insulin, HTN, HLD, CAD, PSH R partial 5th ray amputation 8/19/2021 p/w R foot pain x1 day associated with foul smelling discharge due to osteomyelitis being treated with iv antibx .  ICU admission for hypoxia requiring mechanical ventilation      Assessment:  - Acute Hypoxic Respiratory Failure  - Pulmonary Edema  - Pleural Effusion   - Osteomyelitis of right foot   - OREN superimposed on CKD stage 3  - Severe Aortic stenosis  - CAD s/p CABG   - Cholelithiasis   - HTN  - HLD  - DM uncontrolled  - Diabetic foot ulcer  - New onset Atrial fibrillation       Plan   -On mechanical ventilation   -Hold all sedation and monitor neurologic status  -Daily weaning trials, will continue to attempt weaning, tolerating SBP with PS 5  -Chest tube to water seal per surgery, minimal drainage  -Pleural effusion appears to be transudative   -Bleeding noted at PICC site, heparin infusion held for now  -home dose beta blockers for rate control  -cards following   -Cont ASA and statin   -hemodynamic monitoring   -continue antibx with vancomycin/meropenem, adjust dose per GFR as improving renal function   -monitor blood sugar with coverage   -advance directives  -DVT/ GI prophylaxis

## 2021-10-12 NOTE — PROGRESS NOTE ADULT - ASSESSMENT
Patient is a 75yo male with hx of DM, HTN, HLD, CKD (Stage 3), CAD, s/p CABG and Right partial 5th foot amp (8/21) who intially presented to the ED of right foot pain, found to have osteomyelitis. ICU was consulted for AHRF 2/2 to Asp PNA requiring BIPAP, patient intubated on 10/7.     Plan:  Neuro:    - On propofoL 10.4 mcg will stop in order to attempt SAT   - fentanyl drip d/c  -Pupils are not equal and reactive, needs a CT head    CVS:  # Severe Aortic Stenosis   - Echo 8/15  confirmed EF 55-60%, Severe LVH, g1 diastolic dysfunction, and severe AS   -Murmurs on exam, systolic  -Cardiology on board SABIHA once stable    # combined systolic and diastolic heart failure  - CT Chest with pulm edema bilaterally; moderate to large pleural effusions; atelectasis vs PNA   - Echo 8/15  confirmed EF 55-60%, Severe LVH, g1 diastolic dysfunction, and severe AS   - new 10/8/2021  EF 40%  - BNP 19K with worsening overload as above; 9K today pt with poor cardiac reserve worsened by acute infection   - CXR  this admission showing improved R sided pleural effusion with diuresis  - Avoid drugs which increase afterload         #CAD   - S/p CABG, on ASA   - Continue Toprol 100 Qd, Procardia 60XL once stable  - Positive trop 1.46->1.550; likely due to increased demand and renal failure.  -Now down trended 1.05  - Echo 10/8/2021 EF 40%  - Will need a SABIHA and R+L heart cath once medically optimized  - C/w ASA, statin, lopressor   - Cardiology Dr. Wilkins     #hypotension  -resolved,   - will continue to monitor not requiring pressors     #Afib   currently resolved, currently in sinus rhythm   The EKG and tele record A-fib with RVR, and left bundle   Afib likely new, old EKGs say sinus rhythm with nonspecific IVCD, LAPB  Hx of CABG  patient was on Heparin drip, noted to be bleeding from Left PICC line  no new bleeding noted will restart Heparin drip 10/13   will start Lopressor 25mg BID with hold parameters       Pulm:  - Acute Hypoxic/ Hypercapnic Resp Failure 2/2  1. RLL PNA vs  2. Right pleural effusions vs  3. CHF    - Patient was on Pendant, saturating 90% on 10L pendant, but having increased WOB, tachypnea   - Possible left lower lobe pneumonia and bilateral pleural effusions on CT Chest   - ABG was showing 7.49/33/52/25-> respiratory alkalosis with hypoxic   -ICU  Bipap 10/5 for hypoxia and work of breathing,  -Got intubated 10/7/2021  -pt also had CABG hx and BNP 19k - 9K this am=dmission  - Per surgery, Tello catheter placed without issue and drained 1000cc immediately and clamped.  - Sent fluid for tests/ cultures LDH, cytology..  -c/w with abx ( Vanc and Meropenem )   - avoid fluid overloading   -Vent set 14/450/5/40 sats 100 plan for SBT today     ID:  - Empiric Aspiration PNA coverage, R foot osteomyelitis, and Funguria   - Blood Cultures -ve till date, Bone and wound culture: showing Aeromonas, GBS, Klebsiella, Enterococcus   -Cultures from 9/22/2021, tissue and blood 9/17 negative  - UCX showing yeast - one source  - On Unasyn, Levaquin, Diflucan; renally dosed - all >12 days  -DC diflucan as it is only one source, also given today's LFTs  -DC Levaquin  -Continue on vanc and faustino with renal dosing   -f/u vanc trough 10/12     Nephro:  - Ibrahim placed for possible obstruction   - OREN on CKD stage III: ATN was resolving, however renal fxn now worsened.   - Kidney/ Bladder US with large distended bladder s/p ibrahim placement on 9/23, then removed.   - S/p bladder scan 10/4 with 1L urine for which Ibrahim was reinserted  - FeUrea: 20.5% 9/23:   - Less likely due to obstructive uropathy.  No peripheral eosinophilia- no signs of AIN.   - Avoid nephrotoxins/ NSAIDs/ RCA. Monitor BMP.  -UOP now -ve   - Currently SCr at 1.43 downtrending, GFR at 40   -Nephrology on board Dr. Rodriguez     GI:  -RLL likely aspiration  - Had an Aspiration event after NG tube placed and subsequently removed by pt  - Aspiration precautions, speech and swallow recs, based on cineesophagogram  - Cholelithiasis noted on RUQ US   - Patient is having 2 lose stools daily  -however patient is now intubated   - NPO with tube feed     #shock liver  most likely due to ischemic liver injury   improving currently AST, ALT in 200's and 300's   maintain BP  f/u  RUQ US     Heme:  - no indication for transfusion currently  - admitted with Hb 12.7 in August; small downtrend in the setting of CKD   - Fe panel shows deficiency; will hold repletion during acute infection  - Likely component of ACD, CKD  - patient noted to be bleeding from Left PICC line, Hgb down trending from 8.5 to 7.8  - will repeat CBC at 12pm, holding Heparin   - Tranfuse if Hb<7 or if worsening tachy/HF sx     Endo:  - target CBG < 180 controlled NPO today  -On tube feeds  - C/w Sliding Scale, accuchecks ACHS     Dispo:  - monitor in the ICU   - PT FULL CODE - confirmed w/ Daughter Mrs. Arabella Oliva

## 2021-10-13 LAB
ALBUMIN SERPL ELPH-MCNC: 2.1 G/DL — LOW (ref 3.5–5)
ALP SERPL-CCNC: 180 U/L — HIGH (ref 40–120)
ALT FLD-CCNC: 227 U/L DA — HIGH (ref 10–60)
ANION GAP SERPL CALC-SCNC: 6 MMOL/L — SIGNIFICANT CHANGE UP (ref 5–17)
AST SERPL-CCNC: 153 U/L — HIGH (ref 10–40)
BASE EXCESS BLDA CALC-SCNC: 2.5 MMOL/L — SIGNIFICANT CHANGE UP (ref -2–3)
BILIRUB SERPL-MCNC: 0.5 MG/DL — SIGNIFICANT CHANGE UP (ref 0.2–1.2)
BLOOD GAS COMMENTS ARTERIAL: SIGNIFICANT CHANGE UP
BUN SERPL-MCNC: 19 MG/DL — HIGH (ref 7–18)
CALCIUM SERPL-MCNC: 8.6 MG/DL — SIGNIFICANT CHANGE UP (ref 8.4–10.5)
CHLORIDE SERPL-SCNC: 111 MMOL/L — HIGH (ref 96–108)
CO2 SERPL-SCNC: 28 MMOL/L — SIGNIFICANT CHANGE UP (ref 22–31)
CREAT SERPL-MCNC: 1.31 MG/DL — HIGH (ref 0.5–1.3)
CULTURE RESULTS: SIGNIFICANT CHANGE UP
GLUCOSE SERPL-MCNC: 199 MG/DL — HIGH (ref 70–99)
HCO3 BLDA-SCNC: 26 MMOL/L — SIGNIFICANT CHANGE UP (ref 21–28)
HCT VFR BLD CALC: 27.3 % — LOW (ref 39–50)
HGB BLD-MCNC: 8.6 G/DL — LOW (ref 13–17)
HOROWITZ INDEX BLDA+IHG-RTO: 40 — SIGNIFICANT CHANGE UP
MAGNESIUM SERPL-MCNC: 2.1 MG/DL — SIGNIFICANT CHANGE UP (ref 1.6–2.6)
MCHC RBC-ENTMCNC: 29.7 PG — SIGNIFICANT CHANGE UP (ref 27–34)
MCHC RBC-ENTMCNC: 31.5 GM/DL — LOW (ref 32–36)
MCV RBC AUTO: 94.1 FL — SIGNIFICANT CHANGE UP (ref 80–100)
NON-GYNECOLOGICAL CYTOLOGY STUDY: SIGNIFICANT CHANGE UP
NRBC # BLD: 0 /100 WBCS — SIGNIFICANT CHANGE UP (ref 0–0)
PCO2 BLDA: 37 MMHG — SIGNIFICANT CHANGE UP (ref 35–48)
PH BLDA: 7.46 — HIGH (ref 7.35–7.45)
PHOSPHATE SERPL-MCNC: 2.2 MG/DL — LOW (ref 2.5–4.5)
PHOSPHATE SERPL-MCNC: 3.5 MG/DL — SIGNIFICANT CHANGE UP (ref 2.5–4.5)
PLATELET # BLD AUTO: 182 K/UL — SIGNIFICANT CHANGE UP (ref 150–400)
PO2 BLDA: 199 MMHG — HIGH (ref 83–108)
POTASSIUM SERPL-MCNC: 4.2 MMOL/L — SIGNIFICANT CHANGE UP (ref 3.5–5.3)
POTASSIUM SERPL-SCNC: 4.2 MMOL/L — SIGNIFICANT CHANGE UP (ref 3.5–5.3)
PROT SERPL-MCNC: 6.5 G/DL — SIGNIFICANT CHANGE UP (ref 6–8.3)
RBC # BLD: 2.9 M/UL — LOW (ref 4.2–5.8)
RBC # FLD: 15.1 % — HIGH (ref 10.3–14.5)
SAO2 % BLDA: 100 % — SIGNIFICANT CHANGE UP
SARS-COV-2 RNA SPEC QL NAA+PROBE: SIGNIFICANT CHANGE UP
SODIUM SERPL-SCNC: 145 MMOL/L — SIGNIFICANT CHANGE UP (ref 135–145)
SPECIMEN SOURCE: SIGNIFICANT CHANGE UP
WBC # BLD: 6.34 K/UL — SIGNIFICANT CHANGE UP (ref 3.8–10.5)
WBC # FLD AUTO: 6.34 K/UL — SIGNIFICANT CHANGE UP (ref 3.8–10.5)

## 2021-10-13 PROCEDURE — 99232 SBSQ HOSP IP/OBS MODERATE 35: CPT

## 2021-10-13 PROCEDURE — 71045 X-RAY EXAM CHEST 1 VIEW: CPT | Mod: 26

## 2021-10-13 RX ORDER — POTASSIUM PHOSPHATE, MONOBASIC POTASSIUM PHOSPHATE, DIBASIC 236; 224 MG/ML; MG/ML
15 INJECTION, SOLUTION INTRAVENOUS ONCE
Refills: 0 | Status: COMPLETED | OUTPATIENT
Start: 2021-10-13 | End: 2021-10-13

## 2021-10-13 RX ORDER — GABAPENTIN 400 MG/1
100 CAPSULE ORAL ONCE
Refills: 0 | Status: COMPLETED | OUTPATIENT
Start: 2021-10-13 | End: 2021-10-13

## 2021-10-13 RX ORDER — COLLAGENASE CLOSTRIDIUM HIST. 250 UNIT/G
1 OINTMENT (GRAM) TOPICAL DAILY
Refills: 0 | Status: DISCONTINUED | OUTPATIENT
Start: 2021-10-13 | End: 2021-11-03

## 2021-10-13 RX ORDER — HYDROMORPHONE HYDROCHLORIDE 2 MG/ML
1 INJECTION INTRAMUSCULAR; INTRAVENOUS; SUBCUTANEOUS ONCE
Refills: 0 | Status: DISCONTINUED | OUTPATIENT
Start: 2021-10-13 | End: 2021-10-13

## 2021-10-13 RX ORDER — ENOXAPARIN SODIUM 100 MG/ML
90 INJECTION SUBCUTANEOUS
Refills: 0 | Status: DISCONTINUED | OUTPATIENT
Start: 2021-10-13 | End: 2021-10-14

## 2021-10-13 RX ORDER — GABAPENTIN 400 MG/1
100 CAPSULE ORAL ONCE
Refills: 0 | Status: DISCONTINUED | OUTPATIENT
Start: 2021-10-13 | End: 2021-10-13

## 2021-10-13 RX ADMIN — POTASSIUM PHOSPHATE, MONOBASIC POTASSIUM PHOSPHATE, DIBASIC 62.5 MILLIMOLE(S): 236; 224 INJECTION, SOLUTION INTRAVENOUS at 09:00

## 2021-10-13 RX ADMIN — MEROPENEM 100 MILLIGRAM(S): 1 INJECTION INTRAVENOUS at 05:54

## 2021-10-13 RX ADMIN — Medication 1 APPLICATION(S): at 17:13

## 2021-10-13 RX ADMIN — GABAPENTIN 100 MILLIGRAM(S): 400 CAPSULE ORAL at 21:24

## 2021-10-13 RX ADMIN — Medication 2: at 17:13

## 2021-10-13 RX ADMIN — CHLORHEXIDINE GLUCONATE 15 MILLILITER(S): 213 SOLUTION TOPICAL at 05:53

## 2021-10-13 RX ADMIN — Medication 25 MILLIGRAM(S): at 17:15

## 2021-10-13 RX ADMIN — Medication 300 MILLIGRAM(S): at 14:00

## 2021-10-13 RX ADMIN — HYDROMORPHONE HYDROCHLORIDE 1 MILLIGRAM(S): 2 INJECTION INTRAMUSCULAR; INTRAVENOUS; SUBCUTANEOUS at 17:35

## 2021-10-13 RX ADMIN — Medication 2: at 11:53

## 2021-10-13 RX ADMIN — PANTOPRAZOLE SODIUM 40 MILLIGRAM(S): 20 TABLET, DELAYED RELEASE ORAL at 21:06

## 2021-10-13 RX ADMIN — Medication 300 MILLIGRAM(S): at 11:08

## 2021-10-13 RX ADMIN — CHLORHEXIDINE GLUCONATE 1 APPLICATION(S): 213 SOLUTION TOPICAL at 05:54

## 2021-10-13 RX ADMIN — MEROPENEM 100 MILLIGRAM(S): 1 INJECTION INTRAVENOUS at 17:34

## 2021-10-13 RX ADMIN — HYDROMORPHONE HYDROCHLORIDE 1 MILLIGRAM(S): 2 INJECTION INTRAMUSCULAR; INTRAVENOUS; SUBCUTANEOUS at 18:05

## 2021-10-13 RX ADMIN — ENOXAPARIN SODIUM 90 MILLIGRAM(S): 100 INJECTION SUBCUTANEOUS at 17:13

## 2021-10-13 RX ADMIN — Medication 81 MILLIGRAM(S): at 11:08

## 2021-10-13 RX ADMIN — Medication 2: at 05:54

## 2021-10-13 RX ADMIN — Medication 25 MILLIGRAM(S): at 05:53

## 2021-10-13 RX ADMIN — FINASTERIDE 5 MILLIGRAM(S): 5 TABLET, FILM COATED ORAL at 11:08

## 2021-10-13 RX ADMIN — ATORVASTATIN CALCIUM 80 MILLIGRAM(S): 80 TABLET, FILM COATED ORAL at 21:06

## 2021-10-13 RX ADMIN — PREGABALIN 1000 MICROGRAM(S): 225 CAPSULE ORAL at 11:08

## 2021-10-13 NOTE — PROGRESS NOTE ADULT - SUBJECTIVE AND OBJECTIVE BOX
Podiatry Interval: Pt seen at bedside in ICU. Unable to attain a thorough history and physical from patient today.     Podiatry HPI: 76 year old male with a PMHx of DM, HTN, HLD, CAD to ED presents to the ED for a Right Foot s/p 5th foot partial ray resection and fillet toe flap. Patient states that he has sharp localized non-radiating pain to the Right foot 5th metatarsal. States that after his surgery, he did not see a podiatrist and he came into the ED because he saw drainage and was having pain. Denies any constitutional symptoms of N/V/C/F/SOB. Denies any other pedal complaints at this time. Denies calf pain today, bilaterally.      Medications aspirin  chewable 81 milliGRAM(s) Oral daily  atorvastatin 80 milliGRAM(s) Oral at bedtime  chlorhexidine 2% Cloths 1 Application(s) Topical <User Schedule>  cyanocobalamin 1000 MICROGram(s) Oral daily  enoxaparin Injectable 90 milliGRAM(s) SubCutaneous two times a day  ergocalciferol 40932 Unit(s) Oral <User Schedule>  ferrous    sulfate Liquid 300 milliGRAM(s) Enteral Tube daily  finasteride 5 milliGRAM(s) Oral daily  insulin lispro (ADMELOG) corrective regimen sliding scale   SubCutaneous every 6 hours  meropenem  IVPB 1000 milliGRAM(s) IV Intermittent every 12 hours  metoprolol tartrate 25 milliGRAM(s) Oral two times a day  ondansetron Injectable 4 milliGRAM(s) IV Push three times a day PRN  pantoprazole  Injectable 40 milliGRAM(s) IV Push at bedtime  sodium chloride 0.9% lock flush 10 milliLiter(s) IV Push every 1 hour PRN  vancomycin  IVPB 1500 milliGRAM(s) IV Intermittent daily    FHFamily history of acute myocardial infarction (Father)    Family history of diabetes mellitus (Mother, Sibling)    Family history of hypertension (Mother, Sibling)    Family history of hyperlipidemia (Mother, Sibling)    ,   PMHHTN (hypertension)    HLD (hyperlipidemia)    DM (diabetes mellitus)    CAD (coronary artery disease)    Glaucoma, angle-closure    [place] (hard of hearing)       PSHNo significant past surgical history    S/P CABG (coronary artery bypass graft)    History of thyroid surgery        Labs                          8.6    6.34  )-----------( 182      ( 13 Oct 2021 04:58 )             27.3      10-13    145  |  111<H>  |  19<H>  ----------------------------<  199<H>  4.2   |  28  |  1.31<H>    Ca    8.6      13 Oct 2021 04:58  Phos  2.2     10-13  Mg     2.1     10-13    TPro  6.5  /  Alb  2.1<L>  /  TBili  0.5  /  DBili  x   /  AST  153<H>  /  ALT  227<H>  /  AlkPhos  180<H>  10-13     Vital Signs Last 24 Hrs  T(C): 37.2 (13 Oct 2021 12:00), Max: 37.4 (12 Oct 2021 19:15)  T(F): 98.9 (13 Oct 2021 12:00), Max: 99.3 (12 Oct 2021 19:15)  HR: 85 (13 Oct 2021 12:25) (75 - 97)  BP: 125/58 (13 Oct 2021 12:00) (125/58 - 160/70)  BP(mean): 75 (13 Oct 2021 12:00) (72 - 98)  RR: 23 (13 Oct 2021 12:25) (14 - 27)  SpO2: 97% (13 Oct 2021 12:25) (97% - 100%)  Sedimentation Rate, Erythrocyte: 85 mm/Hr (10-08-21 @ 03:52)  Sedimentation Rate, Erythrocyte: 108 mm/Hr (10-02-21 @ 07:02)         C-Reactive Protein, Serum: 53 mg/L (10-08-21 @ 12:10)  C-Reactive Protein, Serum: 43 mg/L (10-02-21 @ 14:05)  C-Reactive Protein, Serum: 45 mg/L (09-16-21 @ 23:33)  C-Reactive Protein, Serum: 85 mg/L (08-22-21 @ 21:30)  C-Reactive Protein, Serum: 67 mg/L (08-15-21 @ 11:55)   WBC Count: 6.34 K/uL (10-13-21 @ 04:58)    PT/INR - ( 07 Oct 2021 04:07 )   PT: 16.6 sec;   INR: 1.42 ratio         CAPILLARY BLOOD GLUCOSE    POCT Blood Glucose.: 191 mg/dL (10.13.21 @ 11:17)   POCT Blood Glucose.: 138 mg/dL (10.11.21 @ 05:39)   POCT Blood Glucose.: 121 mg/dL (08 Oct 2021 05:38)  POCT Blood Glucose.: 130 mg/dL (07 Oct 2021 22:01)  POCT Blood Glucose.: 149 mg/dL (07 Oct 2021 17:22)  POCT Blood Glucose.: 172 mg/dL (07 Oct 2021 11:12)      ROS: All others negative unless otherwise stated in the HPI        PHYSICAL EXAM Kept bandage intact   LE Focused:    Vasc:  DP/PT pulses were faintly palpable, bilateral. DP/PT pulses were monophasic on doppler, bilaterally. CFT<3 seconds to all digits.   Derm: Surgical site with graft in place to R 5th ray, granular wound bed through the graft with patches of necrotic islands noted, minimal drainage or discharge. minimal erythema and edema noted, eschar forming over the wound. Dorsal foot wound noted with tendon exposed, DTI noted to b/l heel  Neuro: Protective sensation grossly diminished, b/l  MSK: 5/5 muscle strength noted to the pedal compartments. 5th digit amputation R foot        Imaging:    3 views right foot. Prior 8/20/2021.    Status post resection of the fifth toe and distal fifth metatarsal unchanged in appearance. No focal bone lysis or unusual periosteal reaction to suggest osteomyelitis. No acute fracture or dislocation. The remainder study unchanged. No soft tissue gas.    IMPRESSION: No acute finding. No plain film evidence of osteomyelitis.      MRI R foot:     INTERPRETATION:  Clinical Information: Recent fifth metatarsal head resection now with fifth digit pain, swelling and foul discharge.    Comparison: Radiographs of the right foot from 9/16/2021 and MRI the right foot from 8/16/2021.    Technique:  MRI of the right midfoot and forefoot.  Intravenous Contrast: None.    Findings:    There is a resection at the mid aspect of the fifth metatarsal shaft. There is a lateral soft tissue wound beginning at the level of the amputation which extends distally. There is susceptibility artifact in the region of the amputation consistent with postoperative change. There is hyperintense T2 marrow signal throughout the remaining fifth metatarsal and within the adjacent fourth metatarsal head which is nonspecific and could be related to recent postoperative changes although osteomyelitis is suspected.    There is edema and mild atrophy within the plantar muscles of the foot. There is minimal spurring at the first metatarsophalangeal and hallux sesamoid articulations.    Impression:  Resection at the mid aspect of the fifth metatarsal. Lateral soft tissue wound with marrow signal abnormality throughout the fifth metatarsal and within the adjacent fourth metatarsal head which is nonspecific in the setting of recent surgery although osteomyelitis is suspected.        Culture Results:     Rare Aeromonas hydrophila/caviae   Few Klebsiella oxytoca/Raoutella ornithinolytica   Few Enterococcus faecalis   Few Streptococcus agalactiae (Group B) isolated   Group B streptococci are susceptible to ampicillin,   penicillin and cefazolin, but may be resistant to   erythromycin and clindamycin.   Recommendations for intrapartum prophylaxis for Group B   streptococci are penicillin or ampicillin. (09.16.21 @ 21:42)     Gram Stain:   No polymorphonuclear cells seen per low power field   No organisms seen per oil power field (09.22.21 @ 13:54)       Historical Values  Gram Stain:   No polymorphonuclear cells seen per low power field   No organisms seen per oil power field (09.22.21 @ 13:54)   Gram Stain:   No polymorphonuclear leukocytes seen per low power field   No organisms seen per oil power field (08.20.21 @ 03:59)

## 2021-10-13 NOTE — PROGRESS NOTE ADULT - SUBJECTIVE AND OBJECTIVE BOX
Patient is a 76y old  Male who presents with a chief complaint of R Foot Pain (13 Oct 2021 15:21)    PATIENT IS SEEN AND EXAMINED IN MEDICAL FLOOR.  KEENANT [    ]    MADDY [   ]      GT [   ]    ALLERGIES:  No Known Allergies      Daily     Daily Weight in k.7 (13 Oct 2021 08:00)    VITALS:    Vital Signs Last 24 Hrs  T(C): 37.1 (13 Oct 2021 19:45), Max: 37.4 (13 Oct 2021 04:05)  T(F): 98.8 (13 Oct 2021 19:45), Max: 99.3 (13 Oct 2021 04:05)  HR: 74 (13 Oct 2021 20:00) (74 - 96)  BP: 137/53 (13 Oct 2021 20:00) (118/65 - 160/70)  BP(mean): 75 (13 Oct 2021 20:00) (72 - 98)  RR: 9 (13 Oct 2021 20:00) (9 - 32)  SpO2: 97% (13 Oct 2021 20:00) (93% - 100%)    LABS:    CBC Full  -  ( 13 Oct 2021 04:58 )  WBC Count : 6.34 K/uL  RBC Count : 2.90 M/uL  Hemoglobin : 8.6 g/dL  Hematocrit : 27.3 %  Platelet Count - Automated : 182 K/uL  Mean Cell Volume : 94.1 fl  Mean Cell Hemoglobin : 29.7 pg  Mean Cell Hemoglobin Concentration : 31.5 gm/dL  Auto Neutrophil # : x  Auto Lymphocyte # : x  Auto Monocyte # : x  Auto Eosinophil # : x  Auto Basophil # : x  Auto Neutrophil % : x  Auto Lymphocyte % : x  Auto Monocyte % : x  Auto Eosinophil % : x  Auto Basophil % : x    PTT - ( 12 Oct 2021 04:34 )  PTT:91.7 sec  10-13    145  |  111<H>  |  19<H>  ----------------------------<  199<H>  4.2   |  28  |  1.31<H>    Ca    8.6      13 Oct 2021 04:58  Phos  3.5     10-13  Mg     2.1     10-13    TPro  6.5  /  Alb  2.1<L>  /  TBili  0.5  /  DBili  x   /  AST  153<H>  /  ALT  227<H>  /  AlkPhos  180<H>  10-13    CAPILLARY BLOOD GLUCOSE      POCT Blood Glucose.: 182 mg/dL (13 Oct 2021 16:16)  POCT Blood Glucose.: 191 mg/dL (13 Oct 2021 11:17)  POCT Blood Glucose.: 176 mg/dL (12 Oct 2021 23:13)        LIVER FUNCTIONS - ( 13 Oct 2021 04:58 )  Alb: 2.1 g/dL / Pro: 6.5 g/dL / ALK PHOS: 180 U/L / ALT: 227 U/L DA / AST: 153 U/L / GGT: x           Creatinine Trend: 1.31<--, 1.43<--, 1.62<--, 1.95<--, 2.45<--, 3.03<--  I&O's Summary    12 Oct 2021 07:01  -  13 Oct 2021 07:00  --------------------------------------------------------  IN: 910 mL / OUT: 2415 mL / NET: -1505 mL    13 Oct 2021 07:01  -  13 Oct 2021 20:49  --------------------------------------------------------  IN: 585 mL / OUT: 730 mL / NET: -145 mL        ABG - ( 13 Oct 2021 04:51 )  pH, Arterial: 7.46  pH, Blood: x     /  pCO2: 37    /  pO2: 199   / HCO3: 26    / Base Excess: 2.5   /  SaO2: 100                 .Body Fluid Pleural Fluid  10-08 @ 18:51   Culture is being performed.  --  --      .Body Fluid Pleural Fluid  10-08 @ 18:34   No growth at 5 days  --    polymorphonuclear leukocytes seen  No organisms seen  by cytocentrifuge      .Tissue Right 5th toe r/o osteomyeltis   @ 13:54   No growth at 5 days  --    No polymorphonuclear cells seen per low power field  No organisms seen per oil power field      .Blood Blood-Venous   @ 10:28   No Growth Final  --  --      .Surgical Swab right foot wound   @ 21:42   Culture yields >4 types of aerobic and/or anaerobic bacteria  Call client services within 7 days if further workup is clinically  indicated. Culture includes  Rare Aeromonas hydrophila/caviae  Few Klebsiella oxytoca/Raoutella ornithinolytica  Few Enterococcus faecalis  Few Streptococcus agalactiae (Group B) isolated  Group B streptococci are susceptible to ampicillin,  penicillin and cefazolin, but may be resistant to  erythromycin and clindamycin.  Recommendations for intrapartum prophylaxis for Group B  streptococci are penicillin or ampicillin.  --  Aeromonas hydrophila/caviae  Klebsiella oxytoca /Raoutella ornithinolytica  Enterococcus faecalis      Bone R 5th metatarsal bone clean ma   @ 03:59   No growth at 5 days  --    No polymorphonuclear leukocytes seen per low power field  No organisms seen per oil power field      .Blood Blood-Venous   @ 21:09   No Growth Final  --  --      .Surgical Swab Right foot plantar wound   @ 21:08   Moderate Streptococcus agalactiae (Group B) isolated  Group B streptococci are susceptible to ampicillin,  penicillin and cefazolin, but may be resistant to  erythromycin and clindamycin.  Recommendations for intrapartum prophylaxis for Group B  streptococci are penicillin or ampicillin.  Moderate Bacteroides fragilis "Susceptibilities not performed"  Normal skin filiberto isolated  --  --          MEDICATIONS:    MEDICATIONS  (STANDING):  aspirin  chewable 81 milliGRAM(s) Oral daily  atorvastatin 80 milliGRAM(s) Oral at bedtime  chlorhexidine 2% Cloths 1 Application(s) Topical <User Schedule>  collagenase Ointment 1 Application(s) Topical daily  cyanocobalamin 1000 MICROGram(s) Oral daily  enoxaparin Injectable 90 milliGRAM(s) SubCutaneous two times a day  ergocalciferol 87024 Unit(s) Oral <User Schedule>  ferrous    sulfate Liquid 300 milliGRAM(s) Enteral Tube daily  finasteride 5 milliGRAM(s) Oral daily  gabapentin 100 milliGRAM(s) Oral once  insulin lispro (ADMELOG) corrective regimen sliding scale   SubCutaneous every 6 hours  meropenem  IVPB 1000 milliGRAM(s) IV Intermittent every 12 hours  metoprolol tartrate 25 milliGRAM(s) Oral two times a day  pantoprazole  Injectable 40 milliGRAM(s) IV Push at bedtime  vancomycin  IVPB 1500 milliGRAM(s) IV Intermittent daily      MEDICATIONS  (PRN):  ondansetron Injectable 4 milliGRAM(s) IV Push three times a day PRN Nausea and/or Vomiting  sodium chloride 0.9% lock flush 10 milliLiter(s) IV Push every 1 hour PRN Pre/post blood products, medications, blood draw, and to maintain line patency      REVIEW OF SYSTEMS:                           ALL ROS DONE [ X   ]    CONSTITUTIONAL:  LETHARGIC [   ], FEVER [   ], UNRESPONSIVE [   ]  CVS:  CP  [   ], SOB, [   ], PALPITATIONS [   ], DIZZYNESS [   ]  RS: COUGH [   ], SPUTUM [   ]  GI: ABDOMINAL PAIN [   ], NAUSEA [   ], VOMITINGS [   ], DIARRHEA [   ], CONSTIPATION [   ]  :  DYSURIA [   ], NOCTURIA [   ], INCREASED FREQUENCY [   ], DRIBLING [   ],  SKELETAL: PAINFUL JOINTS [   ], SWOLLEN JOINTS [   ], NECK ACHE [   ], LOW BACK ACHE [   ],  SKIN : ULCERS [   ], RASH [   ], ITCHING [   ]  CNS: HEAD ACHE [   ], DOUBLE VISION [   ], BLURRED VISION [   ], AMS / CONFUSION [   ], SEIZURES [   ], WEAKNESS [   ],TINGLING / NUMBNESS [   ]      PHYSICAL EXAMINATION:  GENERAL APPEARANCE: NO DISTRESS  HEENT:  NO PALLOR, NO  JVD,  NO   NODES, NECK SUPPLE  CVS: S1 +, S2 +,   RS: AEEB,  OCCASIONAL  RALES +,   NO RONCHI, CRACKLES +   ET+  ABD: SOFT, NT, NO, BS +       EXT: NO PE  SKIN: WARM,   RIGHT FOOT WRAPPED W/ WOUND VAC IN PLACE   ,   CVC+  ,   CT + [RIGH] ATTACHED TO PLEUROVAC  SKELETAL:  ROM ACCEPTABLE  CNS:  AAO X  0    RADIOLOGY :    EXAM:  CT ABDOMEN AND PELVIS OC                            PROCEDURE DATE:  2021          INTERPRETATION:  CLINICAL INFORMATION: Small bowel obstruction.    COMPARISON: None.    CONTRAST/COMPLICATIONS:  IV Contrast: NONE  Oral Contrast: Omnipaque 300  Complications: None reported at time of study completion    PROCEDURE:  CT of the Abdomen and Pelvis was performed.  Sagittal and coronal reformats were performed.    FINDINGS:  LOWER CHEST: Small bilateral pleural effusions with compressiveatelectasis of both lower lobes.    LIVER: Within normal limits.  BILE DUCTS: Normal caliber.  GALLBLADDER: Distended. Small layering gallstones.  SPLEEN: Within normal limits.  PANCREAS: Within normal limits.  ADRENALS: Within normal limits.  KIDNEYS/URETERS: No hydronephrosis. Nonobstructing left upper pole calculus, measuring 0.6 cm.    BLADDER: Urinary bladder contains air and a Castañeda catheter balloon.  REPRODUCTIVE ORGANS: Prostate is not enlarged.    BOWEL: No bowel obstruction. Appendix isnormal. Colonic diverticulosis.  PERITONEUM: Mild presacral fluid.  VESSELS: Atherosclerotic changes.  RETROPERITONEUM/LYMPH NODES: No lymphadenopathy.  ABDOMINAL WALL: Within normal limits.  BONES: Degenerative changes. Sternotomy.    IMPRESSION:  No acute intra-abdominal pathology.    Small bilateral pleural effusions with compressive atelectasis of both lower lobes.    ====================================================    EXAM:  MR FOOT RT                            PROCEDURE DATE:  2021          INTERPRETATION:  Clinical Information: Recent fifth metatarsal head resection now with fifth digit pain, swelling and foul discharge.    Comparison: Radiographs of the right foot from 2021 and MRI the right foot from 2021.    Technique:  MRI of the right midfoot and forefoot.  Intravenous Contrast: None.    Findings:    There is a resection at the mid aspect of the fifth metatarsal shaft. There is a lateral soft tissue wound beginning at the level of the amputation which extends distally. There is susceptibility artifact in the region of the amputation consistent with postoperative change. There is hyperintense T2 marrow signal throughout the remaining fifth metatarsal and within the adjacent fourth metatarsal head which is nonspecific and could be related to recent postoperative changes although osteomyelitis is suspected.    There is edema and mild atrophy within the plantar muscles of the foot. There is minimal spurring at the first metatarsophalangeal and hallux sesamoid articulations.    Impression:  Resection at the mid aspect of the fifth metatarsal. Lateral soft tissue wound with marrow signal abnormality throughout the fifth metatarsal and within the adjacent fourth metatarsal head which is nonspecific in the setting of recent surgery although osteomyelitis is suspected.        ASSESSMENT :     Headache    HTN (hypertension)    HLD (hyperlipidemia)    DM (diabetes mellitus)    CAD (coronary artery disease)    Glaucoma, angle-closure    [place] (hard of hearing)    No significant past surgical history    S/P CABG (coronary artery bypass graft)    History of thyroid surgery        PLAN:  HPI:  76M from home, lives with daughter w/ PMH DM on insulin, HTN, HLD, CAD, PSH R partial 5th ray amputation 2021 p/w R foot pain x1 day associated with foul smelling discharge. Pt with sharp pain on the R lateral foot. As per daughter, pt was taking tylenol which did not help his pain prompting the patient to ask his daughter to take him to the hospital. Pt is a poor historian. Daughter states that the patient did not see his podiatrist after the surgery. No fever, chest, pain, palpitations, nausea, vomiting, diarrhea (17 Sep 2021 01:11)    # TRANSFERRED TO ICU FOR WORSENING HYPOXIA AND DYSPNEA    # COVID EXPOSURE ON 10/13 - ON CONTACT AND DROPLET ISOLATION PRECAUTION; F/U COVID PCR    # SUSPECT RIGHT FOOT OSTEOMYELITIS S/P RIGHT 5TH RAY RESECTION [] AND S/P DEBRIDEMENT, GRAFT APPLICATION, BONE BX AND WOUND VAC APPLICATION   ** RECENT ADMISSION FOR RIGHT DIABETIC FOOT ULCER, CELLULITIS, OSTEOMYELITIS RIGHT 5th METATARSAL S/P RIGHT 5TH PARTIAL RAY AMPUTATION [] - BONE PATHOLOGY W/ CLEAN MARGINS    - S/P VANCOMYCIN AND ZOSYN ; NOW ON UNASYN AND LEVAQUIN GIVEN OREN; PER ID SWITCH TO ZOSYN UNTIL 11/3  - RECENTLY HAD SIMON W/ MILD PAD  - REVIEWED WOUND CX [ ENTEROCOCCUS, AEROMONAS, KLEBSIELLA] AND BCX  - BONE BX - acute osteomyelitis and necrotic periosteal tissue.   - ID CONSULT, PODIATRY CONSULT    - PICC LINE PLACED TO COMPLETE 6 WEEKS OF ANTIBIOTICS - PER ID SWITCH TO ZOSYN UNTIL 11/3    # ACUTE HYPOXIC RESPIRATORY FAILURE SUSPECT S/T PULMONARY EDEMA IN THE SETTING OF SEVERE AORTIC STENOSIS + ASPIRATION PNA; COMBINED SYSTOLIC + DIASTOLIC HF - S/P CT TUBE PLACEMENT [SET TO PLEUROVAC] - SWITCHED FROM LEVAQUIN TO VANCOMYCIN + MEROPENEM, LASIX, CARDIOLOGY CONSULT IN PROGRESS  - CRITICAL CARE CONSULT  - ASPIRATION EVENT WHEN PATIENT REMOVED NGT   - S/P LASIX ON 10/1 - 10/2, 10/4 AND 10/5  - CT CHEST W/ BILATERAL PLEURAL EFFUSIONS [10/5] - PULMONOLOGY CONSULT FOR POSSIBLE THORACENTESIS & WILL CONTINUE LASIX   - ALSO F/U REPEAT ECHOCARDIOGRAM  - CT SURGERY WAS CONSULTED BUT UNABLE TO PLACE PIGTAIL, THUS IR CONSULT IN PROGRESS FOR PIGTAIL PLACEMENT  - CHEST TUBE PLACED [10/8]  - NOTED REPEAT ECHO    # SHOCK - ? S/T HYPOVOLEMIA VS. SEPSIS - W/ CVC [SWITCHED FROM FEMORAL TO LEFT IJ], S/P VASOPRESSORS - SWITCHED TO VANCOMYCIN AND MEROPENEM  - F/U BCX  - ID CONSULT IN PROGRESS    # TRANSIENT NEW ONSET A.FIB, PAROXYSMAL - MONITORING ON CARDIAC MONITOR, HEPARIN GTT, CARDIOLOGY CONSULT IN PROGRESS    # FUNGURIA - D/C FLUCONAZOLE GIVEN TRANSAMINITIS    # TRANSAMINITIS - SUSPECT THIS IS ISCHEMIC HEPATITIS - IMPROVING - HEPATOLOGY CONSULT IN PROGRESS    # BOWEL DISTENTION - ? ILEUS - OPTIMIZING ELECTROLYTES - IMPROVED  - SURGERY CONSULT IN PROGRESS  - PASSED 2 BMS ON     # CHOLELITHIASIS - TRENDING LFTS, RUQ U/S - CHOLELITHIASIS, SURGERY CONSULT IN PROGRESS  - GI CONSULT IN PROGRESS - LESS SUSPICIOUS FOR CHOLECYSTITIS    # DYSPEPSIA - PLACED ON PPI BID WITH IMPROVEMENT, UNDERWENT ST EVAL     # ELEVATED TROPONINS - NSTEMI - ? DEMAND - WILL NEED ISCHEMIC EVAL ONCE ACUTE INFECTION RESOLVES PER CARDIOLOGY, CARDIOLOGY CONSULT IN PROGRESS  - ON ASA, STATIN, BB    # OREN ON CKD - S/T ATN AND URINARY RETENTION - S/P IVF, NOW W/ CASTAÑEDA, NEPHROLOGY CONUSLT IN PROGRESS  - ON FLOMAX, ADDED FINASTERIDE    - WITH WORSENING RENAL FXN - NOTED URINARY RETENTION [10/4] - REPLACED CASTAÑEDA    # MEDICAL CLEARANCE FOR SURGERY - RCRI - 2 - 10.1 % 30 DAY RISK OF DEATH, MI OR CARDIAC ARREST  - CARDIOLOGY CONSULT     # DM -  HBA1C - 11  - LANTUS, SSI + FS    # CAD S/P CABG - PLACED ON ASA, STATIN, BB  - ECHO - SEVERE AORTIC STENOSIS, SEVERE CONCENTRIC LVH, G1DD, MILD OH  - CARDIOLOGY CONSULT - DR. BASSETT   - MAY NEED ISCHEMIC EVAL ONCE ACUTE ILLNESS RESOLVES INCLUDING CARDIAC CATHETERIZATION    # HTN - ON METOPROLOL, NICARDIPINE    # SEVERE, ASYMPTOMATIC, STENOSIS - ONCE ACUTE ILLNESS RESOLVES, WILL LIKELY NEED ISCHEMIC WORKUP, SABIHA TO EVALUATE FURTHER. D/W PATIENT, DAUGHTER AND CARDIOLOGY TEAM [PREVIOUSLY SAW DR. BASSETT]    # VITAMIN D DEFICIENCY - ON CHOLECALCIFEROL    # HLD - STATIN    # CASE DISCUSSED AT LENGTH WITH PATIENT AND DAUGHTER, BIRDIE ROBERT AT BEDSIDE AND VIA PHONE @ [phone] [10/5] - CASE DICUSSED AT LENGTH AND ALL QUESTIONS WERE ANSWERED. DAUGHTER UNDERSTOOD THAT PROGNOSIS IS GUARDED.  # PATIENT AND DAUGHTER REFUSED [facility] ON PREVIOUS ADMISSION, PREVIOUSLY WISHED FOR PATIENT RETURN HOME W/ HOME PT; HOWEVER NOW, DAUGHTER WISHES FOR PATIENT TO GO TO [facility], AS PATIENT'S WIFE IS CURRENTLY ADMITTED THERE    # GI AND DVT PPX   Patient is a 76y old  Male who presents with a chief complaint of R Foot Pain (13 Oct 2021 15:21)    PATIENT IS SEEN AND EXAMINED IN MEDICAL FLOOR.    ALLERGIES:  No Known Allergies    Daily     Daily Weight in k.7 (13 Oct 2021 08:00)    VITALS:    Vital Signs Last 24 Hrs  T(C): 37.1 (13 Oct 2021 19:45), Max: 37.4 (13 Oct 2021 04:05)  T(F): 98.8 (13 Oct 2021 19:45), Max: 99.3 (13 Oct 2021 04:05)  HR: 74 (13 Oct 2021 20:00) (74 - 96)  BP: 137/53 (13 Oct 2021 20:00) (118/65 - 160/70)  BP(mean): 75 (13 Oct 2021 20:00) (72 - 98)  RR: 9 (13 Oct 2021 20:00) (9 - 32)  SpO2: 97% (13 Oct 2021 20:00) (93% - 100%)    LABS:    CBC Full  -  ( 13 Oct 2021 04:58 )  WBC Count : 6.34 K/uL  RBC Count : 2.90 M/uL  Hemoglobin : 8.6 g/dL  Hematocrit : 27.3 %  Platelet Count - Automated : 182 K/uL  Mean Cell Volume : 94.1 fl  Mean Cell Hemoglobin : 29.7 pg  Mean Cell Hemoglobin Concentration : 31.5 gm/dL  Auto Neutrophil # : x  Auto Lymphocyte # : x  Auto Monocyte # : x  Auto Eosinophil # : x  Auto Basophil # : x  Auto Neutrophil % : x  Auto Lymphocyte % : x  Auto Monocyte % : x  Auto Eosinophil % : x  Auto Basophil % : x    PTT - ( 12 Oct 2021 04:34 )  PTT:91.7 sec  10-13    145  |  111<H>  |  19<H>  ----------------------------<  199<H>  4.2   |  28  |  1.31<H>    Ca    8.6      13 Oct 2021 04:58  Phos  3.5     1013  Mg     2.1     10-13    TPro  6.5  /  Alb  2.1<L>  /  TBili  0.5  /  DBili  x   /  AST  153<H>  /  ALT  227<H>  /  AlkPhos  180<H>  1013    CAPILLARY BLOOD GLUCOSE      POCT Blood Glucose.: 182 mg/dL (13 Oct 2021 16:16)  POCT Blood Glucose.: 191 mg/dL (13 Oct 2021 11:17)  POCT Blood Glucose.: 176 mg/dL (12 Oct 2021 23:13)        LIVER FUNCTIONS - ( 13 Oct 2021 04:58 )  Alb: 2.1 g/dL / Pro: 6.5 g/dL / ALK PHOS: 180 U/L / ALT: 227 U/L DA / AST: 153 U/L / GGT: x           Creatinine Trend: 1.31<--, 1.43<--, 1.62<--, 1.95<--, 2.45<--, 3.03<--  I&O's Summary    12 Oct 2021 07:01  -  13 Oct 2021 07:00  --------------------------------------------------------  IN: 910 mL / OUT: 2415 mL / NET: -1505 mL    13 Oct 2021 07:01  -  13 Oct 2021 20:49  --------------------------------------------------------  IN: 585 mL / OUT: 730 mL / NET: -145 mL        ABG - ( 13 Oct 2021 04:51 )  pH, Arterial: 7.46  pH, Blood: x     /  pCO2: 37    /  pO2: 199   / HCO3: 26    / Base Excess: 2.5   /  SaO2: 100                 .Body Fluid Pleural Fluid  10-08 @ 18:51   Culture is being performed.  --  --      .Body Fluid Pleural Fluid  10-08 @ 18:34   No growth at 5 days  --    polymorphonuclear leukocytes seen  No organisms seen  by cytocentrifuge      .Tissue Right 5th toe r/o osteomyeltis   @ 13:54   No growth at 5 days  --    No polymorphonuclear cells seen per low power field  No organisms seen per oil power field      .Blood Blood-Venous   @ 10:28   No Growth Final  --  --      .Surgical Swab right foot wound   @ 21:42   Culture yields >4 types of aerobic and/or anaerobic bacteria  Call client services within 7 days if further workup is clinically  indicated. Culture includes  Rare Aeromonas hydrophila/caviae  Few Klebsiella oxytoca/Raoutella ornithinolytica  Few Enterococcus faecalis  Few Streptococcus agalactiae (Group B) isolated  Group B streptococci are susceptible to ampicillin,  penicillin and cefazolin, but may be resistant to  erythromycin and clindamycin.  Recommendations for intrapartum prophylaxis for Group B  streptococci are penicillin or ampicillin.  --  Aeromonas hydrophila/caviae  Klebsiella oxytoca /Raoutella ornithinolytica  Enterococcus faecalis      Bone R 5th metatarsal bone clean ma   @ 03:59   No growth at 5 days  --    No polymorphonuclear leukocytes seen per low power field  No organisms seen per oil power field      .Blood Blood-Venous   @ 21:09   No Growth Final  --  --      .Surgical Swab Right foot plantar wound   @ 21:08   Moderate Streptococcus agalactiae (Group B) isolated  Group B streptococci are susceptible to ampicillin,  penicillin and cefazolin, but may be resistant to  erythromycin and clindamycin.  Recommendations for intrapartum prophylaxis for Group B  streptococci are penicillin or ampicillin.  Moderate Bacteroides fragilis "Susceptibilities not performed"  Normal skin filiberto isolated  --  --          MEDICATIONS:    MEDICATIONS  (STANDING):  aspirin  chewable 81 milliGRAM(s) Oral daily  atorvastatin 80 milliGRAM(s) Oral at bedtime  chlorhexidine 2% Cloths 1 Application(s) Topical <User Schedule>  collagenase Ointment 1 Application(s) Topical daily  cyanocobalamin 1000 MICROGram(s) Oral daily  enoxaparin Injectable 90 milliGRAM(s) SubCutaneous two times a day  ergocalciferol 47615 Unit(s) Oral <User Schedule>  ferrous    sulfate Liquid 300 milliGRAM(s) Enteral Tube daily  finasteride 5 milliGRAM(s) Oral daily  gabapentin 100 milliGRAM(s) Oral once  insulin lispro (ADMELOG) corrective regimen sliding scale   SubCutaneous every 6 hours  meropenem  IVPB 1000 milliGRAM(s) IV Intermittent every 12 hours  metoprolol tartrate 25 milliGRAM(s) Oral two times a day  pantoprazole  Injectable 40 milliGRAM(s) IV Push at bedtime  vancomycin  IVPB 1500 milliGRAM(s) IV Intermittent daily      MEDICATIONS  (PRN):  ondansetron Injectable 4 milliGRAM(s) IV Push three times a day PRN Nausea and/or Vomiting  sodium chloride 0.9% lock flush 10 milliLiter(s) IV Push every 1 hour PRN Pre/post blood products, medications, blood draw, and to maintain line patency      REVIEW OF SYSTEMS:                           ALL ROS DONE [ X   ]    CONSTITUTIONAL:  LETHARGIC [   ], FEVER [   ], UNRESPONSIVE [   ]  CVS:  CP  [   ], SOB, [   ], PALPITATIONS [   ], DIZZYNESS [   ]  RS: COUGH [   ], SPUTUM [   ]  GI: ABDOMINAL PAIN [   ], NAUSEA [   ], VOMITINGS [   ], DIARRHEA [   ], CONSTIPATION [   ]  :  DYSURIA [   ], NOCTURIA [   ], INCREASED FREQUENCY [   ], DRIBLING [   ],  SKELETAL: PAINFUL JOINTS [   ], SWOLLEN JOINTS [   ], NECK ACHE [   ], LOW BACK ACHE [   ],  SKIN : ULCERS [   ], RASH [   ], ITCHING [   ]  CNS: HEAD ACHE [   ], DOUBLE VISION [   ], BLURRED VISION [   ], AMS / CONFUSION [   ], SEIZURES [   ], WEAKNESS [   ],TINGLING / NUMBNESS [   ]      PHYSICAL EXAMINATION:  GENERAL APPEARANCE: NO DISTRESS  HEENT:  NO PALLOR, NO  JVD,  NO   NODES, NECK SUPPLE  CVS: S1 +, S2 +,   RS: AEEB,  OCCASIONAL  RALES +,  RONCHI +, CRACKLES +     ABD: SOFT, NT, NO, BS +       EXT: NO PE  SKIN: WARM,   RIGHT FOOT WRAPPED W/ WOUND VAC IN PLACE   ,   CVC+  ,   CT + [RIGH] ATTACHED TO PLEUROVAC  SKELETAL:  ROM ACCEPTABLE  CNS:  AAO X  0    RADIOLOGY :    EXAM:  CT ABDOMEN AND PELVIS OC                            PROCEDURE DATE:  2021          INTERPRETATION:  CLINICAL INFORMATION: Small bowel obstruction.    COMPARISON: None.    CONTRAST/COMPLICATIONS:  IV Contrast: NONE  Oral Contrast: Omnipaque 300  Complications: None reported at time of study completion    PROCEDURE:  CT of the Abdomen and Pelvis was performed.  Sagittal and coronal reformats were performed.    FINDINGS:  LOWER CHEST: Small bilateral pleural effusions with compressiveatelectasis of both lower lobes.    LIVER: Within normal limits.  BILE DUCTS: Normal caliber.  GALLBLADDER: Distended. Small layering gallstones.  SPLEEN: Within normal limits.  PANCREAS: Within normal limits.  ADRENALS: Within normal limits.  KIDNEYS/URETERS: No hydronephrosis. Nonobstructing left upper pole calculus, measuring 0.6 cm.    BLADDER: Urinary bladder contains air and a Castañeda catheter balloon.  REPRODUCTIVE ORGANS: Prostate is not enlarged.    BOWEL: No bowel obstruction. Appendix isnormal. Colonic diverticulosis.  PERITONEUM: Mild presacral fluid.  VESSELS: Atherosclerotic changes.  RETROPERITONEUM/LYMPH NODES: No lymphadenopathy.  ABDOMINAL WALL: Within normal limits.  BONES: Degenerative changes. Sternotomy.    IMPRESSION:  No acute intra-abdominal pathology.    Small bilateral pleural effusions with compressive atelectasis of both lower lobes.    ====================================================    EXAM:  MR FOOT RT                            PROCEDURE DATE:  2021          INTERPRETATION:  Clinical Information: Recent fifth metatarsal head resection now with fifth digit pain, swelling and foul discharge.    Comparison: Radiographs of the right foot from 2021 and MRI the right foot from 2021.    Technique:  MRI of the right midfoot and forefoot.  Intravenous Contrast: None.    Findings:    There is a resection at the mid aspect of the fifth metatarsal shaft. There is a lateral soft tissue wound beginning at the level of the amputation which extends distally. There is susceptibility artifact in the region of the amputation consistent with postoperative change. There is hyperintense T2 marrow signal throughout the remaining fifth metatarsal and within the adjacent fourth metatarsal head which is nonspecific and could be related to recent postoperative changes although osteomyelitis is suspected.    There is edema and mild atrophy within the plantar muscles of the foot. There is minimal spurring at the first metatarsophalangeal and hallux sesamoid articulations.    Impression:  Resection at the mid aspect of the fifth metatarsal. Lateral soft tissue wound with marrow signal abnormality throughout the fifth metatarsal and within the adjacent fourth metatarsal head which is nonspecific in the setting of recent surgery although osteomyelitis is suspected.        ASSESSMENT :     Headache    HTN (hypertension)    HLD (hyperlipidemia)    DM (diabetes mellitus)    CAD (coronary artery disease)    Glaucoma, angle-closure    [place] (hard of hearing)    No significant past surgical history    S/P CABG (coronary artery bypass graft)    History of thyroid surgery        PLAN:  HPI:  76M from home, lives with daughter w/ PMH DM on insulin, HTN, HLD, CAD, PSH R partial 5th ray amputation 2021 p/w R foot pain x1 day associated with foul smelling discharge. Pt with sharp pain on the R lateral foot. As per daughter, pt was taking tylenol which did not help his pain prompting the patient to ask his daughter to take him to the hospital. Pt is a poor historian. Daughter states that the patient did not see his podiatrist after the surgery. No fever, chest, pain, palpitations, nausea, vomiting, diarrhea (17 Sep 2021 01:11)    # TRANSFERRED TO ICU FOR WORSENING HYPOXIA AND DYSPNEA    # COVID EXPOSURE ON 10/13 - ON CONTACT AND DROPLET ISOLATION PRECAUTION; F/U COVID PCR    # SUSPECT RIGHT FOOT OSTEOMYELITIS S/P RIGHT 5TH RAY RESECTION [] AND S/P DEBRIDEMENT, GRAFT APPLICATION, BONE BX AND WOUND VAC APPLICATION   ** RECENT ADMISSION FOR RIGHT DIABETIC FOOT ULCER, CELLULITIS, OSTEOMYELITIS RIGHT 5th METATARSAL S/P RIGHT 5TH PARTIAL RAY AMPUTATION [] - BONE PATHOLOGY W/ CLEAN MARGINS    - S/P VANCOMYCIN AND ZOSYN ; NOW ON UNASYN AND LEVAQUIN GIVEN OREN; PER ID SWITCH TO ZOSYN UNTIL 11/3  - RECENTLY HAD SIMON W/ MILD PAD  - REVIEWED WOUND CX [ ENTEROCOCCUS, AEROMONAS, KLEBSIELLA] AND BCX  - BONE BX - acute osteomyelitis and necrotic periosteal tissue.   - ID CONSULT, PODIATRY CONSULT    - PICC LINE PLACED TO COMPLETE 6 WEEKS OF ANTIBIOTICS - PER ID SWITCH TO ZOSYN UNTIL 11/3    # ACUTE HYPOXIC RESPIRATORY FAILURE SUSPECT S/T PULMONARY EDEMA IN THE SETTING OF SEVERE AORTIC STENOSIS + ASPIRATION PNA; COMBINED SYSTOLIC + DIASTOLIC HF - S/P CT TUBE PLACEMENT [SET TO PLEUROVAC] - SWITCHED FROM LEVAQUIN TO VANCOMYCIN + MEROPENEM, LASIX, CARDIOLOGY CONSULT IN PROGRESS  - CRITICAL CARE CONSULT  - ASPIRATION EVENT WHEN PATIENT REMOVED NGT   - S/P LASIX ON 10/1 - 10/2, 10/4 AND 10/5  - CT CHEST W/ BILATERAL PLEURAL EFFUSIONS [10/5] - PULMONOLOGY CONSULT FOR POSSIBLE THORACENTESIS & WILL CONTINUE LASIX   - ALSO F/U REPEAT ECHOCARDIOGRAM  - CT SURGERY WAS CONSULTED BUT UNABLE TO PLACE PIGTAIL, THUS IR CONSULT IN PROGRESS FOR PIGTAIL PLACEMENT  - CHEST TUBE PLACED [10/8]  - NOTED REPEAT ECHO  - S/P EXTUBATION 10/13    # SHOCK - ? S/T HYPOVOLEMIA VS. SEPSIS - W/ CVC [SWITCHED FROM FEMORAL TO LEFT IJ], S/P VASOPRESSORS - SWITCHED TO VANCOMYCIN AND MEROPENEM  - F/U BCX  - ID CONSULT IN PROGRESS    # TRANSIENT NEW ONSET A.FIB, PAROXYSMAL - MONITORING ON CARDIAC MONITOR, HEPARIN GTT, CARDIOLOGY CONSULT IN PROGRESS    # FUNGURIA - D/C FLUCONAZOLE GIVEN TRANSAMINITIS    # TRANSAMINITIS - SUSPECT THIS IS ISCHEMIC HEPATITIS - IMPROVING - HEPATOLOGY CONSULT IN PROGRESS    # BOWEL DISTENTION - ? ILEUS - OPTIMIZING ELECTROLYTES - IMPROVED  - SURGERY CONSULT IN PROGRESS  - PASSED 2 BMS ON     # CHOLELITHIASIS - TRENDING LFTS, RUQ U/S - CHOLELITHIASIS, SURGERY CONSULT IN PROGRESS  - GI CONSULT IN PROGRESS - LESS SUSPICIOUS FOR CHOLECYSTITIS    # DYSPEPSIA - PLACED ON PPI BID WITH IMPROVEMENT, UNDERWENT ST EVAL     # ELEVATED TROPONINS - NSTEMI - ? DEMAND - WILL NEED ISCHEMIC EVAL ONCE ACUTE INFECTION RESOLVES PER CARDIOLOGY, CARDIOLOGY CONSULT IN PROGRESS  - ON ASA, STATIN, BB    # OREN ON CKD - S/T ATN AND URINARY RETENTION - S/P IVF, NOW W/ CASTAÑEDA, NEPHROLOGY CONUSLT IN PROGRESS  - ON FLOMAX, ADDED FINASTERIDE    - WITH WORSENING RENAL FXN - NOTED URINARY RETENTION [10/4] - REPLACED CASTAÑEDA    # MEDICAL CLEARANCE FOR SURGERY - RCRI - 2 - 10.1 % 30 DAY RISK OF DEATH, MI OR CARDIAC ARREST  - CARDIOLOGY CONSULT     # DM -  HBA1C - 11  - LANTUS, SSI + FS    # CAD S/P CABG - PLACED ON ASA, STATIN, BB  - ECHO - SEVERE AORTIC STENOSIS, SEVERE CONCENTRIC LVH, G1DD, MILD IN  - CARDIOLOGY CONSULT - DR. BASSETT   - MAY NEED ISCHEMIC EVAL ONCE ACUTE ILLNESS RESOLVES INCLUDING CARDIAC CATHETERIZATION    # HTN - ON METOPROLOL, NICARDIPINE    # SEVERE, ASYMPTOMATIC, STENOSIS - ONCE ACUTE ILLNESS RESOLVES, WILL LIKELY NEED ISCHEMIC WORKUP, SABIHA TO EVALUATE FURTHER. D/W PATIENT, DAUGHTER AND CARDIOLOGY TEAM [PREVIOUSLY SAW DR. BASSETT]    # VITAMIN D DEFICIENCY - ON CHOLECALCIFEROL    # HLD - STATIN    # CASE DISCUSSED AT LENGTH WITH PATIENT AND DAUGHTER, BIRDIE ROBERT AT BEDSIDE AND VIA PHONE @ [phone] [10/5] - CASE DICUSSED AT LENGTH AND ALL QUESTIONS WERE ANSWERED. DAUGHTER UNDERSTOOD THAT PROGNOSIS IS GUARDED.  # PATIENT AND DAUGHTER REFUSED [facility] ON PREVIOUS ADMISSION, PREVIOUSLY WISHED FOR PATIENT RETURN HOME W/ HOME PT; HOWEVER NOW, DAUGHTER WISHES FOR PATIENT TO GO TO [facility], AS PATIENT'S WIFE IS CURRENTLY ADMITTED THERE    # GI AND DVT PPX

## 2021-10-13 NOTE — PROGRESS NOTE ADULT - SUBJECTIVE AND OBJECTIVE BOX
Patient is seen and examined at the bed side, is afebrile. He remains intubated/vented. The events noted regarding COVID exposure. The kidney function continue improving.       REVIEW OF SYSTEMS: Unable to obtain due to mental status      ALLERGIES: No Known Allergies        Vital Signs Last 24 Hrs  T(C): 37.4 (13 Oct 2021 04:05), Max: 37.4 (12 Oct 2021 19:15)  T(F): 99.3 (13 Oct 2021 04:05), Max: 99.3 (12 Oct 2021 19:15)  HR: 89 (13 Oct 2021 08:56) (75 - 97)  BP: 139/65 (13 Oct 2021 07:00) (132/58 - 160/70)  BP(mean): 84 (13 Oct 2021 07:00) (75 - 97)  RR: 24 (13 Oct 2021 07:00) (19 - 27)  SpO2: 100% (13 Oct 2021 08:56) (99% - 100%)      PHYSICAL EXAM:  GENERAL: Intubated/vented  CHEST/LUNG:  Not using accessory muscles, Right CT in placed   HEART: s1 and s2 present  ABDOMEN:  Mild distended   EXTREMITIES: Right foot bandage in placed   CNS: Intubated/ vented      LABS:                            8.6    6.34  )-----------( 182      ( 13 Oct 2021 04:58 )             27.3                       8.5    5.50  )-----------( 163      ( 11 Oct 2021 05:44 )             27.2       10-13    145  |  111<H>  |  19<H>  ----------------------------<  199<H>  4.2   |  28  |  1.31<H>    Ca    8.6      13 Oct 2021 04:58  Phos  2.2     10-13  Mg     2.1     10-13    TPro  6.5  /  Alb  2.1<L>  /  TBili  0.5  /  DBili  x   /  AST  153<H>  /  ALT  227<H>  /  AlkPhos  180<H>  10-13    10-10    146<H>  |  110<H>  |  32<H>  ----------------------------<  110<H>  3.3<L>   |  30  |  1.95<H>    Ca    8.2<L>      10 Oct 2021 03:54  Phos  3.7     10-10  Mg     2.3     10-10    TPro  6.3  /  Alb  2.4<L>  /  TBili  0.5  /  DBili  x   /  AST  489<H>  /  ALT  559<H>  /  AlkPhos  146<H>  10-10      09-25    139  |  107  |  41<H>  ----------------------------<  134<H>  4.1   |  21<L>  |  4.05<H>    Ca    8.6      25 Sep 2021 06:40    TPro  6.6  /  Alb  2.3<L>  /  TBili  0.5  /  DBili  x   /  AST  25  /  ALT  15  /  AlkPhos  102  09-25        CAPILLARY BLOOD GLUCOSE  POCT Blood Glucose.: 134 mg/dL (17 Sep 2021 17:11)  POCT Blood Glucose.: 128 mg/dL (17 Sep 2021 11:06)  POCT Blood Glucose.: 157 mg/dL (17 Sep 2021 04:10)    Vancomycin Level, Trough (10.12.21 @ 12:13)   Vancomycin Level, Trough: 19.5:    MEDICATIONS  (STANDING):    aspirin  chewable 81 milliGRAM(s) Oral daily  atorvastatin 80 milliGRAM(s) Oral at bedtime  chlorhexidine 0.12% Liquid 15 milliLiter(s) Oral Mucosa every 12 hours  chlorhexidine 2% Cloths 1 Application(s) Topical <User Schedule>  cyanocobalamin 1000 MICROGram(s) Oral daily  enoxaparin Injectable 90 milliGRAM(s) SubCutaneous two times a day  ergocalciferol 02796 Unit(s) Oral <User Schedule>  ferrous    sulfate Liquid 300 milliGRAM(s) Enteral Tube daily  finasteride 5 milliGRAM(s) Oral daily  insulin lispro (ADMELOG) corrective regimen sliding scale   SubCutaneous every 6 hours  meropenem  IVPB 1000 milliGRAM(s) IV Intermittent every 12 hours  metoprolol tartrate 25 milliGRAM(s) Oral two times a day  pantoprazole  Injectable 40 milliGRAM(s) IV Push at bedtime  potassium phosphate IVPB 15 milliMole(s) IV Intermittent once  vancomycin  IVPB 1500 milliGRAM(s) IV Intermittent daily        RADIOLOGY & ADDITIONAL TESTS:    10/5/21 CT Chest No Cont (10.05.21 @ 14:07) Pulmonary edema with bilateral moderate to large pleural effusions. Underlying bilateral lower lobe compressive atelectasis versus pneumonia.  Mildly enlarged mediastinal lymph nodes, possibly reactive.      10/2/21: Xray Chest 1 View- PORTABLE-Routine (Xray Chest 1 View- PORTABLE-Routine in AM.) (10.02.21 @ 09:46) There is persistent bilateral perihilar/basilar diffuse airspace disease and/or RIGHT effusion.  Cardiomegaly.  No interval change.    Collected Date/Time: 9/22/2021 08:42 EDT   Received Date/Time: 9/23/2021 09:29 EDT   Surgical Pathology Report - Auth (Verified)   Specimen(s) Submitted   1 Right 5th Toe Wound Debridement r/o Osteomyelitis   Final Diagnosis   Right fifth toe; wound debridement:   - Bone with acute osteomyelitis and necrotic periosteal tissue.     9/28/21: US Abdomen Upper Quadrant Right (09.28.21 @ 12:13) Cholelithiasis without evidence of acute cholecystitis. Note is made of right pleural effusion    9/27/21: CT Abdomen and Pelvis w/ Oral Cont (09.27.21 @ 15:53) >No acute intra-abdominal pathology.    Small bilateral pleural effusions with compressive atelectasis of both lower lobes.    9/26/21: Xray Chest 1 View-PORTABLE IMMEDIATE (Xray Chest 1 View-PORTABLE IMMEDIATE .) (09.26.21 @ 15:28) : Small bilateral pleural effusions and mild pulmonary edema. A right lower lobe pneumonia is not excluded.      9/26/21: Xray Abdomen 1 View Portable, IMMEDIATE (Xray Abdomen 1 View Portable, IMMEDIATE .) (09.26.21 @ 15:28) : There is a nasogastric tube with its tip in the stomach. There is gaseous distention of the colon. No pathologic calcifications are seen. The osseous structures are intact with degenerative change is present in the spine.      9/17/21 : MR Foot No Cont, Right (09.17.21 @ 13:04) : Resection at the mid aspect of the fifth metatarsal. Lateral soft tissue wound with marrow signal abnormality throughout the fifth metatarsal and within the adjacent fourth metatarsal head which is nonspecific in the setting of recent surgery although osteomyelitis is suspected.    9/16/21 : Xray Foot AP + Lateral + Oblique, Right (09.16.21 @ 15:03) : No acute finding. No plain film evidence of osteomyelitis.        MICROBIOLOGY DATA:    COVID-19 PCR (10.11.21 @ 05:44)   COVID-19 PCR: NotDetec:   Urine Microscopic-Add On (NC) (09.22.21 @ 16:14)   Red Blood Cell - Urine: 0-2 /HPF   White Blood Cell - Urine: 3-5 /HPF   Bacteria: Moderate /HPF   Comment - Urine: yeast cells present   Epithelial Cells: Moderate /HPF     Culture - Tissue with Gram Stain (09.22.21 @ 13:54)   Gram Stain: No polymorphonuclear cells seen per low power field   No organisms seen per oil power field   Specimen Source: .Tissue Right 5th toe r/o osteomyeltis   Culture Results: No growth     COVID-19 David Domain Antibody (09.18.21 @ 12:55)   COVID-19 David Domain Antibody Result: >250.00:    Culture - Blood (09.17.21 @ 10:28)   Specimen Source: .Blood Blood-Peripheral   Culture Results: No growth to date.     Culture - Blood (09.17.21 @ 10:28)   Specimen Source: .Blood Blood-Venous   Culture Results: No growth to date.     Culture - Surgical Swab (09.16.21 @ 21:42)   - Amikacin: S <=16   - Amikacin: S <=16   - Amoxicillin/Clavulanic Acid: S <=8/4   - Ampicillin: R >16 These ampicillin results predict results for amoxicillin   - Ampicillin: S <=2 Predicts results to ampicillin/sulbactam, amoxacillin-clavulanate and piperacillin-tazobactam.   - Ampicillin/Sulbactam: S 8/4 Enterobacter, Citrobacter, and Serratia may develop resistance during prolonged therapy (3-4 days)   - Ampicillin/Sulbactam: R >16/8   - Aztreonam: S <=4   - Aztreonam: S <=4   - Cefazolin: R 16 Enterobacter, Citrobacter, and Serratia may develop resistance during prolonged therapy (3-4 days)   - Cefazolin: R >16   - Cefepime: S <=2   - Cefepime: S <=2   - Cefoxitin: S <=8   - Cefoxitin: S <=8   - Ceftazidime: S <=1   - Ceftriaxone: S <=1   - Ceftriaxone: S <=1 Enterobacter, Citrobacter, and Serratia may develop resistance during prolonged therapy   - Ciprofloxacin: S <=0.25   - Ciprofloxacin: S <=0.25   - Ertapenem: S <=0.5   - Gentamicin: S <=2   - Gentamicin: S <=2   - Imipenem: S <=1   - Levofloxacin: S <=0.5   - Levofloxacin: S <=0.5   - Meropenem: S <=1   - Meropenem: S <=1   - Piperacillin/Tazobactam: S <=8   - Piperacillin/Tazobactam: S <=8   - Tetra/Doxy: S 4   - Tobramycin: S <=2   - Trimethoprim/Sulfamethoxazole: S <=0.5/9.5   - Trimethoprim/Sulfamethoxazole: S <=0.5/9.5   - Vancomycin: S 2   Specimen Source: .Surgical Swab right foot wound   Culture Results:   Culture yields >4 types of aerobic and/or anaerobic bacteria   Call client services within 7 days if further workup is clinically   indicated. Culture includes   Rare Aeromonas hydrophila/caviae   Few Klebsiella oxytoca/Raoutella ornithinolytica   Few Enterococcus faecalis   Few Streptococcus agalactiae (Group B) isolated   Group B streptococci are susceptible to ampicillin,   penicillin and cefazolin, but may be resistant to   erythromycin and clindamycin.   Recommendations for intrapartum prophylaxis for Group B   streptococci are penicillin or ampicillin.   Organism Identification: Aeromonas hydrophila/caviae   Klebsiella oxytoca /Raoutella ornithinolytica   Enterococcus faecalis   Organism: Aeromonas hydrophila/caviae   Organism: Klebsiella oxytoca /Raoutella ornithinolytica   Organism: Enterococcus faecalis   COVID-19 PCR . (09.16.21 @ 22:24)   COVID-19 PCR: NotDetec:

## 2021-10-13 NOTE — PROGRESS NOTE ADULT - ASSESSMENT
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  As per daughter, patient was taking tylenol which did not help his pain prompting the patient to ask his daughter to take him to the hospital. ON admission, he has no fever or Leukocytosis. The Xray of Right foot shows no Osteomyelitis but MRI of Right foot shows Lateral soft tissue wound with marrow signal abnormality throughout the fifth metatarsal which is consistent of Osteomyelitis. He has seen by Podiatry and wound culture has sent, Zosyn and Vancomycin has started. The ID consult requested to assist with further evaluation and antibiotic management.     # Right Fifth metatarsal DFU with drainage and Osteomyelitis- wound cx grew Enterococcus, Aeromonas and Klebsiella - Zosyn is the ideal to cover All organisms but kidney function is worsening, hence change to Unasyn and Levaquin until kidney function is improved - The pathology shows Bone with acute osteomyelitis and necrotic periosteal tissue.   # OREN- s/p urinary retention -s/p ibrahim catheter - s/p discontinue ACEI  # RLL pneumonia- most likely Aspiration, S/p Vomiting - On Unasyn  # Large bowel distension  # Candiduria- 9/22/21  # Pulmonary edema with bilateral moderate to large pleural effusions- on CT chest, 10/5/21- s/p intubation 10/7- s/p Right sided chest tube placement by CT team, 10/8/21  # COVID Exposure - PCR negative as of  10/11/21    would recommend:    1. Weaning trial as per ICU protocol  2. Care of CT as per CT surgery team  3. Monitor  kidney function, is improving  4. Continue current doses of Vancomycin since level is therapeutic, 19.5.   5. Wound care as per Podiatry  6. Aspiration precaution    d/w ICU team ( Dr. Rascon and the team)     Attending Attestation:    Spent more than 35 minutes on total encounter, more than 50 % of the visit was spent counseling and/or coordinating care by the Attending physician.

## 2021-10-13 NOTE — PROGRESS NOTE ADULT - ATTENDING COMMENTS
IMP: 76 yr old man from home, lives with daughter with DM- uncontrol  on insulin, HTN, HLD, CAD, PSH R partial 5th ray amputation 8/19/2021 p/w R foot pain x1 day associated with foul smelling discharge due to osteomyelitis being treated with iv antibx .  ICU admission for hypoxia requiring mechanical ventilation      Assessment:  - Acute Hypoxic Respiratory Failure  - Pulmonary Edema  - Pleural Effusion   - Osteomyelitis of right foot   - OREN superimposed on CKD stage 3  - Severe Aortic stenosis  - CAD s/p CABG   - Cholelithiasis   - HTN  - HLD  - DM uncontrolled  - Diabetic foot ulcer  - New onset Atrial fibrillation       Plan   -Awake this morning, following commands  -Extubated to NC, not in distress  -Chest tube to water seal per surgery, minimal drainage, f/u thoracic surgery regarding removal  -Pleural effusion appears to be transudative   -No further episodes of bleeding, will resume anticoagulation  -home dose beta blockers for rate control  -cards following   -Cont ASA and statin   -hemodynamic monitoring   -continue antibx with vancomycin/meropenem, adjust dose per GFR as improving renal function   -monitor blood sugar with coverage for hyperglycemia  -advance directives  -DVT/ GI prophylaxis

## 2021-10-13 NOTE — PROGRESS NOTE ADULT - ASSESSMENT
A:   s/p R 5th ray resection - 8/19  s/p R 5th wound debridement, graft application, bone biopsy and wound vac application 9/22       P:  Patient evaluated, chart reviewed  Pt seen in ICU for worsening hypoxia and dyspnea   Dressing removed   Wound vac changed today (10/13)  Staples were removed from the graft site using sterile staple remover   Applied adaptic and santyl to the right dorsal wound - tendon exposed   Continue abx per ID recommendation  Recommend elevation of b/l legs   Continue local wound care   Continue offloading lower extremities in CAIR boots   Will change wound vac every other day   Podiatry to follow while in house  Discussed with attending Dr. Vazquez

## 2021-10-13 NOTE — PROGRESS NOTE ADULT - ASSESSMENT
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 s/p OR debridement today. Nephrology consulted for abrupt rise in SCr.     1. OREN- Recurrent ATN in the setting of infection. Lisinopril discontinued 9/22. ATN resolving with good urine o/p. Now off diuretics; likely recovery phase/ diuretic phase.    s/p CT chest with b/l mod to large pleural effusion R>L. s/p rt pigtail catheter. Phos repleted with Kphos IV. Monitor lytes  Kidney/ Bladder US with large distended bladder s/p ibrahim placement on 9/23, then removed. s/p bladder scan 10/4 with 1L urine for which ibrahim was reinserted; however; renal function did not improve post ibrahim; less likely due to obstructive uropathy.  No peripheral eosinophilia- no signs of AIN. C3 & C4 wnl with neg ASO- no signs of PIGN. Strict I/Os. Avoid nephrotoxins/ NSAIDs/ RCA. Monitor BMP.    2. 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 Will defer secondary w/u as an outpt. Avoid nephrotoxins  3. HTN 2/2 CKD- BP acceptable.  Now off pressors. Plan as per ICU. Monitor BP  4. Acute osteomyelitis- s/p debridement 9/22. Pt now on Vanco and meropenem (renally dosed). Monitor Vanco trough. Pt with mild hypernatremia- change Meropenem to D5 base. Plan as per ID      [facility] NE[facility]  Bryn Johnson M.D.  Domingo Booth D.O.  Zakia Rodriguez M.D.  Zonia Adams, MSN, ANP-C  [phone]    [id number] [place] [address]

## 2021-10-13 NOTE — PROGRESS NOTE ADULT - SUBJECTIVE AND OBJECTIVE BOX
[facility] NEPHROLOGY- PROGRESS NOTE    Patient is a 75yo Male with DM on insulin, HTN, HLD, CAD, s/p R partial 5th ray amputation 8/19/2021 p/w R foot pain and foul drainage a/w diabetic foot ulcer suspicious for osteomyelitis. s/p OR debridement today. Nephrology consulted for abrupt rise in SCr.   s/p ibrahim placement for distended bladder on 9/23 with ~400ml of urine o/p  9/27- pt with SOB; CXR with pulmonary edema- pt given Lasix 80mg IV x1. Concern for aspiration PNA  Course BP: s/p lasix 60mg IV on 10/1 & 10/2 and s/p Lasix 40mg IV x1 today 10/4. Bladder scan with 1L urine; s/p ibrahim reinserted  Transferred to ICU for acute hypoxic respiratory failure, s/p intubated on 10/7, s/p extubated 10/13    Hospital Medications: Medications reviewed.  REVIEW OF SYSTEMS: Unable to obtain;  not arousable    VITALS:  T(F): 98.9 (10-13-21 @ 12:00), Max: 99.3 (10-12-21 @ 19:15)  HR: 84 (10-13-21 @ 15:00)  BP: 138/65 (10-13-21 @ 15:00)  RR: 20 (10-13-21 @ 15:00)  SpO2: 100% (10-13-21 @ 15:00)  Wt(kg): --    10-12 @ 07:01  -  10-13 @ 07:00  --------------------------------------------------------  IN: 910 mL / OUT: 2415 mL / NET: -1505 mL    10-13 @ 07:01  -  10-13 @ 15:22  --------------------------------------------------------  IN: 490 mL / OUT: 280 mL / NET: 210 mL        PHYSICAL EXAM:  Gen: Not answering questions  Cards: RRR, +S1/S2, +TRINIDAD  Resp: CTA ant +rt pigtail catheter  GI: soft,   : +ibrahim  Extremities: no LE edema B/L  Derm: Rt foot wrapped with wound vac    LABS:  10-13    145  |  111<H>  |  19<H>  ----------------------------<  199<H>  4.2   |  28  |  1.31<H>    Ca    8.6      13 Oct 2021 04:58  Phos  2.2     10-13  Mg     2.1     10-13    TPro  6.5  /  Alb  2.1<L>  /  TBili  0.5  /  DBili      /  AST  153<H>  /  ALT  227<H>  /  AlkPhos  180<H>  10-13    Creatinine Trend: 1.31 <--, 1.43 <--, 1.62 <--, 1.95 <--, 2.45 <--, 3.03 <--, 2.12 <--                        8.6    6.34  )-----------( 182      ( 13 Oct 2021 04:58 )             27.3     Urine Studies:

## 2021-10-13 NOTE — PROGRESS NOTE ADULT - SUBJECTIVE AND OBJECTIVE BOX
INTERVAL HPI/OVERNIGHT EVENTS: No acute events overnight. Patient is off all sedation, patient is more alert and is following commands.     Due to COVID positive patient, patient's family member Arabella (daughter) was notified about the COVID exposure and that her father was retested which was negative. Patient's daughter was notified that visitation would be suspended until further notice and made aware that the entire unit was under isolation. Patient's daughter also updated on the patient's clinical condition, including SBT today.     PRESSORS: [ ] YES [X] NO  WHICH:    ANTIBIOTICS:                  DATE STARTED:  ANTIBIOTICS:                  DATE STARTED:  ANTIBIOTICS:                  DATE STARTED:    Antimicrobial:  meropenem  IVPB 1000 milliGRAM(s) IV Intermittent every 12 hours  vancomycin  IVPB 1500 milliGRAM(s) IV Intermittent daily    Cardiovascular:  metoprolol tartrate 25 milliGRAM(s) Oral two times a day    Pulmonary:    Hematalogic:  aspirin  chewable 81 milliGRAM(s) Oral daily  enoxaparin Injectable 90 milliGRAM(s) SubCutaneous two times a day    Other:  atorvastatin 80 milliGRAM(s) Oral at bedtime  chlorhexidine 0.12% Liquid 15 milliLiter(s) Oral Mucosa every 12 hours  chlorhexidine 2% Cloths 1 Application(s) Topical <User Schedule>  cyanocobalamin 1000 MICROGram(s) Oral daily  ergocalciferol 47718 Unit(s) Oral <User Schedule>  ferrous    sulfate Liquid 300 milliGRAM(s) Enteral Tube daily  finasteride 5 milliGRAM(s) Oral daily  insulin lispro (ADMELOG) corrective regimen sliding scale   SubCutaneous every 6 hours  ondansetron Injectable 4 milliGRAM(s) IV Push three times a day PRN  pantoprazole  Injectable 40 milliGRAM(s) IV Push at bedtime  sodium chloride 0.9% lock flush 10 milliLiter(s) IV Push every 1 hour PRN      Drug Dosing Weight  Height (cm): 170.2 (17 Sep 2021 21:58)  Weight (kg): 86.9 (05 Oct 2021 21:45)  BMI (kg/m2): 30 (05 Oct 2021 21:45)  BSA (m2): 1.99 (05 Oct 2021 21:45)    CENTRAL LINE: [ ] YES [X] NO  LOCATION:   DATE INSERTED:  REMOVE: [ ] YES [ ] NO  EXPLAIN:    CASTAÑEDA: [X] YES [ ] NO    DATE INSERTED:  REMOVE:  [ ] YES [ ] NO  EXPLAIN:    A-LINE:  [ ] YES [X] NO  LOCATION:   DATE INSERTED:  REMOVE:  [ ] YES [ ] NO  EXPLAIN:    PMH/Social Hx/Fam Hx -reviewed admission note, no change since admission  PAST MEDICAL & SURGICAL HISTORY:  HTN (hypertension)    HLD (hyperlipidemia)    DM (diabetes mellitus)    CAD (coronary artery disease)    Glaucoma, angle-closure    [place] (hard of hearing)    S/P CABG (coronary artery bypass graft)    History of thyroid surgery        T(C): 37.3 (10-13-21 @ 08:00), Max: 37.4 (10-12-21 @ 19:15)  HR: 79 (10-13-21 @ 09:15)  BP: 159/77 (10-13-21 @ 09:00)  BP(mean): 98 (10-13-21 @ 09:00)  ABP: --  ABP(mean): --  RR: 14 (10-13-21 @ 09:00)  SpO2: 100% (10-13-21 @ 09:15)  Wt(kg): --    ABG - ( 13 Oct 2021 04:51 )  pH, Arterial: 7.46  pH, Blood: x     /  pCO2: 37    /  pO2: 199   / HCO3: 26    / Base Excess: 2.5   /  SaO2: 100                   10-12 @ 07:01  -  10-13 @ 07:00  --------------------------------------------------------  IN: 910 mL / OUT: 2415 mL / NET: -1505 mL        Mode: CPAP with PS  FiO2: 40  PEEP: 5  PS: 5  ITime: 0.9  MAP: 7  PIP: 11      PHYSICAL EXAM:  GENERAL:  Intubated.   HEAD: Nomocephalic, Atraumatic  EYES: blinking, opening eyes  NECK: Supple, No JVD; Normal thyroid; Trachea midline  NERVOUS SYSTEM:  off sedation, can follow commands, still intubated  CHEST/LUNG: No rales, rhonchi, wheezing, Chest tube noted to be on water seal.   HEART: Regular rate and rhythm; Grade III Systolic murmur noted in aortic region   ABDOMEN: Soft, Nontender, Nondistended; Bowel sounds present, Rectal tube noted   : Castañeda noted to be draining clear liquid   EXTREMITIES:  2+ Peripheral Pulses, No clubbing, cyanosis, or edema  SKIN: right toe amputation no bleeding noted.     LABS:  CBC Full  -  ( 13 Oct 2021 04:58 )  WBC Count : 6.34 K/uL  RBC Count : 2.90 M/uL  Hemoglobin : 8.6 g/dL  Hematocrit : 27.3 %  Platelet Count - Automated : 182 K/uL  Mean Cell Volume : 94.1 fl  Mean Cell Hemoglobin : 29.7 pg  Mean Cell Hemoglobin Concentration : 31.5 gm/dL  Auto Neutrophil # : x  Auto Lymphocyte # : x  Auto Monocyte # : x  Auto Eosinophil # : x  Auto Basophil # : x  Auto Neutrophil % : x  Auto Lymphocyte % : x  Auto Monocyte % : x  Auto Eosinophil % : x  Auto Basophil % : x    10-13    145  |  111<H>  |  19<H>  ----------------------------<  199<H>  4.2   |  28  |  1.31<H>    Ca    8.6      13 Oct 2021 04:58  Phos  2.2     10-13  Mg     2.1     10-13    TPro  6.5  /  Alb  2.1<L>  /  TBili  0.5  /  DBili  x   /  AST  153<H>  /  ALT  227<H>  /  AlkPhos  180<H>  10-13    PTT - ( 12 Oct 2021 04:34 )  PTT:91.7 sec        RADIOLOGY & ADDITIONAL STUDIES REVIEWED:      [ ]GOALS OF CARE DISCUSSION WITH PATIENT/FAMILY/PROXY:    CRITICAL CARE TIME SPENT: 35 minutes

## 2021-10-13 NOTE — PROGRESS NOTE ADULT - SUBJECTIVE AND OBJECTIVE BOX
C A R D I O L O G Y  **********************************    DATE OF SERVICE: 10-13-21    Patient is on ventilator support, unable to obtain review of symptoms.      aspirin  chewable 81 milliGRAM(s) Oral daily  atorvastatin 80 milliGRAM(s) Oral at bedtime  chlorhexidine 2% Cloths 1 Application(s) Topical <User Schedule>  cyanocobalamin 1000 MICROGram(s) Oral daily  enoxaparin Injectable 90 milliGRAM(s) SubCutaneous two times a day  ergocalciferol 57887 Unit(s) Oral <User Schedule>  ferrous    sulfate Liquid 300 milliGRAM(s) Enteral Tube daily  finasteride 5 milliGRAM(s) Oral daily  insulin lispro (ADMELOG) corrective regimen sliding scale   SubCutaneous every 6 hours  meropenem  IVPB 1000 milliGRAM(s) IV Intermittent every 12 hours  metoprolol tartrate 25 milliGRAM(s) Oral two times a day  ondansetron Injectable 4 milliGRAM(s) IV Push three times a day PRN  pantoprazole  Injectable 40 milliGRAM(s) IV Push at bedtime  sodium chloride 0.9% lock flush 10 milliLiter(s) IV Push every 1 hour PRN  vancomycin  IVPB 1500 milliGRAM(s) IV Intermittent daily                            8.6    6.34  )-----------( 182      ( 13 Oct 2021 04:58 )             27.3       Hemoglobin: 8.6 g/dL (10-13 @ 04:58)  Hemoglobin: 8.1 g/dL (10-12 @ 12:13)  Hemoglobin: 7.8 g/dL (10-12 @ 04:34)  Hemoglobin: 8.5 g/dL (10-11 @ 05:44)  Hemoglobin: 8.6 g/dL (10-10 @ 06:50)      10-13    145  |  111<H>  |  19<H>  ----------------------------<  199<H>  4.2   |  28  |  1.31<H>    Ca    8.6      13 Oct 2021 04:58  Phos  2.2     10-13  Mg     2.1     10-13    TPro  6.5  /  Alb  2.1<L>  /  TBili  0.5  /  DBili  x   /  AST  153<H>  /  ALT  227<H>  /  AlkPhos  180<H>  10-13    Creatinine Trend: 1.31<--, 1.43<--, 1.62<--, 1.95<--, 2.45<--, 3.03<--    COAGS:           T(C): 37.2 (10-13-21 @ 12:00), Max: 37.4 (10-12-21 @ 19:15)  HR: 85 (10-13-21 @ 12:25) (75 - 97)  BP: 125/58 (10-13-21 @ 12:00) (125/58 - 160/70)  RR: 23 (10-13-21 @ 12:25) (14 - 27)  SpO2: 97% (10-13-21 @ 12:25) (97% - 100%)  Wt(kg): --    I&O's Summary    12 Oct 2021 07:01  -  13 Oct 2021 07:00  --------------------------------------------------------  IN: 910 mL / OUT: 2415 mL / NET: -1505 mL    13 Oct 2021 07:01  -  13 Oct 2021 13:34  --------------------------------------------------------  IN: 490 mL / OUT: 280 mL / NET: 210 mL        HEENT:  (-)icterus (-)pallor  CV: N S1 S2 1/6 [place] (+)2 Pulses B/l  Resp:  Coarse sounds B/L, normal effort  GI: (+) BS Soft, NT, ND  Lymph:  (-)Edema, (-)obvious lymphadenopathy  Skin: Warm to touch, Normal turgor  Psych: Unable to assess mood and affect    Tele: SR      ASSESSMENT/PLAN: 	76y  Male PMH DM on insulin, HTN, HLD, normal lV fx moderate to severe AS 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    - monitor crt, nephrology f/u appreciated  - Abx per primary team  - Echo noted, Severe AS, EF 40-45%   - He will need a SABIHA and R+L heart cath when he recovers and has no active infection  - Cont medical management of CAD  - Pt. with new onset PAF -  on lovenox therapeutic doses   - follow up thoracic surgery , s/p Pig tail  - vent support per MICU, spontaneous breathing trial today    Zak Wilkins MD, [facility]  BEEPER [phone]

## 2021-10-13 NOTE — PROGRESS NOTE ADULT - SUBJECTIVE AND OBJECTIVE BOX
Chief Complaint:  Patient is a 76y old  Male who presents with a chief complaint of R Foot Pain (13 Oct 2021 13:30)      Reason for consult:    Interval Events:     Hospital Medications:  aspirin  chewable 81 milliGRAM(s) Oral daily  atorvastatin 80 milliGRAM(s) Oral at bedtime  chlorhexidine 2% Cloths 1 Application(s) Topical <User Schedule>  cyanocobalamin 1000 MICROGram(s) Oral daily  enoxaparin Injectable 90 milliGRAM(s) SubCutaneous two times a day  ergocalciferol 53768 Unit(s) Oral <User Schedule>  ferrous    sulfate Liquid 300 milliGRAM(s) Enteral Tube daily  finasteride 5 milliGRAM(s) Oral daily  insulin lispro (ADMELOG) corrective regimen sliding scale   SubCutaneous every 6 hours  meropenem  IVPB 1000 milliGRAM(s) IV Intermittent every 12 hours  metoprolol tartrate 25 milliGRAM(s) Oral two times a day  ondansetron Injectable 4 milliGRAM(s) IV Push three times a day PRN  pantoprazole  Injectable 40 milliGRAM(s) IV Push at bedtime  sodium chloride 0.9% lock flush 10 milliLiter(s) IV Push every 1 hour PRN  vancomycin  IVPB 1500 milliGRAM(s) IV Intermittent daily      ROS:   General:  No  fevers, chills, night sweats, fatigue  Eyes:  Good vision, no reported pain  ENT:  No sore throat, pain, runny nose  CV:  No pain, palpitations  Pulm:  No dyspnea, cough  GI:  See HPI, otherwise negative  :  No  incontinence, nocturia  Muscle:  No pain, weakness  Neuro:  No memory problems  Psych:  No insomnia, mood problems, depression  Endocrine:  No polyuria, polydipsia, cold/heat intolerance  Heme:  No petechiae, ecchymosis, easy bruisability  Skin:  No rash    PHYSICAL EXAM:   Vital Signs:  Vital Signs Last 24 Hrs  T(C): 37.2 (13 Oct 2021 12:00), Max: 37.4 (12 Oct 2021 19:15)  T(F): 98.9 (13 Oct 2021 12:00), Max: 99.3 (12 Oct 2021 19:15)  HR: 78 (13 Oct 2021 13:00) (75 - 97)  BP: 136/67 (13 Oct 2021 13:00) (125/58 - 160/70)  BP(mean): 85 (13 Oct 2021 13:00) (72 - 98)  RR: 19 (13 Oct 2021 13:00) (14 - 27)  SpO2: 100% (13 Oct 2021 13:00) (97% - 100%)  Daily     Daily Weight in k.7 (13 Oct 2021 08:00)    GENERAL: no acute distress  NEURO: alert, no asterixis  HEENT: anicteric sclera, no conjunctival pallor appreciated  CHEST: no respiratory distress, no accessory muscle use  CARDIAC: regular rate, rhythm  ABDOMEN: soft, non-tender, non-distended, no rebound or guarding  EXTREMITIES: warm, well perfused, no edema  SKIN: no lesions noted    LABS: reviewed                        8.6    6.34  )-----------( 182      ( 13 Oct 2021 04:58 )             27.3     10-13    145  |  111<H>  |  19<H>  ----------------------------<  199<H>  4.2   |  28  |  1.31<H>    Ca    8.6      13 Oct 2021 04:58  Phos  2.2     10-13  Mg     2.1     10-    TPro  6.5  /  Alb  2.1<L>  /  TBili  0.5  /  DBili  x   /  AST  153<H>  /  ALT  227<H>  /  AlkPhos  180<H>  10-13    LIVER FUNCTIONS - ( 13 Oct 2021 04:58 )  Alb: 2.1 g/dL / Pro: 6.5 g/dL / ALK PHOS: 180 U/L / ALT: 227 U/L DA / AST: 153 U/L / GGT: x             Interval Diagnostic Studies: see sunrise for full report   Chief Complaint:  Patient is a 76y old  Male who presents with a chief complaint of R Foot Pain (13 Oct 2021 13:30)      Reason for consult: Abnormal liver enzymes    Interval Events: Patient was seen and examined at bedside. He is awake, opens eyes for verbal stimuli and following simple command. Remains with chest tube, [place], [facility]. AST/ALT without further improvement or slight uptick.  bilirubin remains normal and denies abdominal pain, no tenderness.     Hospital Medications:  aspirin  chewable 81 milliGRAM(s) Oral daily  atorvastatin 80 milliGRAM(s) Oral at bedtime  chlorhexidine 2% Cloths 1 Application(s) Topical <User Schedule>  cyanocobalamin 1000 MICROGram(s) Oral daily  enoxaparin Injectable 90 milliGRAM(s) SubCutaneous two times a day  ergocalciferol 44552 Unit(s) Oral <User Schedule>  ferrous    sulfate Liquid 300 milliGRAM(s) Enteral Tube daily  finasteride 5 milliGRAM(s) Oral daily  insulin lispro (ADMELOG) corrective regimen sliding scale   SubCutaneous every 6 hours  meropenem  IVPB 1000 milliGRAM(s) IV Intermittent every 12 hours  metoprolol tartrate 25 milliGRAM(s) Oral two times a day  ondansetron Injectable 4 milliGRAM(s) IV Push three times a day PRN  pantoprazole  Injectable 40 milliGRAM(s) IV Push at bedtime  sodium chloride 0.9% lock flush 10 milliLiter(s) IV Push every 1 hour PRN  vancomycin  IVPB 1500 milliGRAM(s) IV Intermittent daily      ROS:   Unable to obtain detailed ROS due to patient condition, but denies pain.     PHYSICAL EXAM:   Vital Signs:  Vital Signs Last 24 Hrs  T(C): 37.2 (13 Oct 2021 12:00), Max: 37.4 (12 Oct 2021 19:15)  T(F): 98.9 (13 Oct 2021 12:00), Max: 99.3 (12 Oct 2021 19:15)  HR: 78 (13 Oct 2021 13:00) (75 - 97)  BP: 136/67 (13 Oct 2021 13:00) (125/58 - 160/70)  BP(mean): 85 (13 Oct 2021 13:00) (72 - 98)  RR: 19 (13 Oct 2021 13:00) (14 - 27)  SpO2: 100% (13 Oct 2021 13:00) (97% - 100%)  Daily     Daily Weight in k.7 (13 Oct 2021 08:00)    GENERAL: no acute distress  NEURO: opens eyes for verbal stimuli, following simple commands  HEENT: anicteric sclera, no conjunctival pallor appreciated  CHEST: no respiratory distress, no accessory muscle use  CARDIAC: regular rate, rhythm  ABDOMEN: soft, non-tender, non-distended, no rebound or guarding, BS+  EXTREMITIES: R foot wound  SKIN: Ecchymoses (on abdominal wall)    LABS: reviewed                        8.6    6.34  )-----------( 182      ( 13 Oct 2021 04:58 )             27.3     10-    145  |  111<H>  |  19<H>  ----------------------------<  199<H>  4.2   |  28  |  1.31<H>    Ca    8.6      13 Oct 2021 04:58  Phos  2.2     10  Mg     2.1     10-13    TPro  6.5  /  Alb  2.1<L>  /  TBili  0.5  /  DBili  x   /  AST  153<H>  /  ALT  227<H>  /  AlkPhos  180<H>  1013    LIVER FUNCTIONS - ( 13 Oct 2021 04:58 )  Alb: 2.1 g/dL / Pro: 6.5 g/dL / ALK PHOS: 180 U/L / ALT: 227 U/L DA / AST: 153 U/L / GGT: x             Interval Diagnostic Studies: see sunrise for full report   2 seconds or less

## 2021-10-13 NOTE — PROGRESS NOTE ADULT - ASSESSMENT
77yo Male with Hx of HTN, HLD, CAD, s/p CABG, severe AS, PAD, s/p R partial 5th ray amputation (8/19/21) presented to [facility] ED on 9/16/21 with R foot pain and foul drainage a/w diabetic foot ulcer  (wound Cx grew Enterococcus, Aeromonas, Klebsiella, Group B Strep 9/16). He was found to have acute osteomyelitis,  s/p OR debridement, bone biopsy, wound vac on 9/22/21. Also noted OREN, and acute hypoxic respiratory failure due to pulmonary edema in setting of severe AS + aspirational PNA, as well as pleural effusions, required intubation on 10/7, became hemodynamically unstable, requiring pressor support since 10/7.   Hepatology was consulted on 10/8 for acute, severe, hepatocellular liver injury, with intact function and cholestatic parameters, suspected likely due to ischemic injury, vs. DILI. The sudden acute rise and significant improvement (AST became half in a day), rather support the ischemic injury.   Transaminases overall improved since 10/8, but not compared to yesterday, <-137 <--1057, <- 252<--559. ALP has been elevated since 10/10, no significant change compared to yesterday and bilirubin remains normal.    - C/w close monitoring of LFTs, including INR  - Avoid hepatotoxic medications when medically feasible (fluconazole and levofloxacin was d/c), if ALT elevation persists > 5-10x ULN, consider holding statin if ok from cardiology standpoint.  - C/w supportive measures per ICU  - C/w antibiotics per ID  - Can send Hep viral panel    - Cholelithiasis and distended GB on prior US 9/28, was no evidence of acute cholecystitis, was seen by GI. Later developed mostly hepatocellular elevation with normal cholestatic parameters. Now ALP slightly up-trending since 10/10, although bilirubin remains normal. Can send bone specific ALP.  - Consider repeat RUQ US if cholestatic parameters worsen.    - On AXR 10/6 concern joey for pneumoperitoneum -patient was too unstable for follow up imaging. Consider follow up imaging when feasible.    Thank you for the consult  Will continue to follow.   Discussed with ICU team.

## 2021-10-13 NOTE — PROGRESS NOTE ADULT - ASSESSMENT
Patient is a 77yo male with hx of DM, HTN, HLD, CKD (Stage 3), CAD, s/p CABG and Right partial 5th foot amp (8/21) who intially presented to the ED of right foot pain, found to have osteomyelitis. ICU was consulted for AHRF 2/2 to Asp PNA requiring BIPAP, patient intubated on 10/7.     Plan:  Neuro:  - Off sedation, following commands   - monitor neurological status,    - will attempt daily weaning trials      CVS:  # Severe Aortic Stenosis   - Echo 8/15  confirmed EF 55-60%, Severe LVH, g1 diastolic dysfunction, and severe AS   -Murmurs on exam, systolic  -Cardiology on board SABIHA once stable    # combined systolic and diastolic heart failure  - CT Chest with pulm edema bilaterally; moderate to large pleural effusions; atelectasis vs PNA   - Echo 8/15  confirmed EF 55-60%, Severe LVH, g1 diastolic dysfunction, and severe AS   - new 10/8/2021  EF 40%  - BNP 19K with worsening overload as above; 9K today pt with poor cardiac reserve worsened by acute infection   - CXR  this admission showing improved R sided pleural effusion with diuresis  - Avoid drugs which increase afterload         #CAD   - S/p CABG, on ASA   - Continue Toprol 100 Qd, Procardia 60XL once stable  - Positive trop 1.46->1.550; likely due to increased demand and renal failure.  -Now down trended 1.05  - Echo 10/8/2021 EF 40%  - Will need a SABIHA and R+L heart cath once medically optimized  - C/w ASA, statin, lopressor   - Cardiology Dr. Wilkins       #Afib   currently resolved, currently in sinus rhythm   The EKG and tele record A-fib with RVR, and left bundle   Afib likely new, old EKGs say sinus rhythm with nonspecific IVCD, LAPB  Hx of CABG  patient was on Heparin drip, noted to be bleeding from Left PICC line, bleeding resolved  no new bleeding noted will start Lovenox 90mg BID, due to improved renal function  will start Lopressor 25mg BID with hold parameters       Pulm:  - Acute Hypoxic/ Hypercapnic Resp Failure 2/2  1. RLL PNA vs  2. Right pleural effusions vs  3. CHF    - Patient was on Pendant, saturating 90% on 10L pendant, but having increased WOB, tachypnea   - Possible left lower lobe pneumonia and bilateral pleural effusions on CT Chest   - ABG was showing 7.49/33/52/25-> respiratory alkalosis with hypoxic   -ICU  Bipap 10/5 for hypoxia and work of breathing,  -Got intubated 10/7/2021  -pt also had CABG hx and BNP 19k - 9K this am=dmission  - Per surgery, Tello catheter placed without issue and drained 1000cc immediately and clamped.  - Sent fluid for tests/ cultures LDH, cytology, seems transudative   - chest tube currently on water seal, f/u with thoracic to remove chest tube   -c/w with abx ( Vanc and Meropenem ), adjust dosing per renal function   - avoid fluid overloading   -Vent set 14/450/5/40 sats 100 plan for SBT today     ID:  - Empiric Aspiration PNA coverage, R foot osteomyelitis, and Funguria   - Blood Cultures -ve till date, Bone and wound culture: showing Aeromonas, GBS, Klebsiella, Enterococcus   -Cultures from 9/22/2021, tissue and blood 9/17 negative  - UCX showing yeast - one source  - On Unasyn, Levaquin, Diflucan; renally dosed - all >12 days  -DC diflucan as it is only one source, also given today's LFTs  -DC Levaquin  -Continue on vanc and faustino with renal dosing   -f/u vanc trough 10/14/21 at 11:30AM      Nephro:  - Ibrahim placed for possible obstruction   - OREN on CKD stage III: ATN was resolving, however renal fxn now worsened.   - Kidney/ Bladder US with large distended bladder s/p ibrahim placement on 9/23, then removed.   - S/p bladder scan 10/4 with 1L urine for which Ibrahim was reinserted  - FeUrea: 20.5% 9/23:   - Less likely due to obstructive uropathy.  No peripheral eosinophilia- no signs of AIN.   - Avoid nephrotoxins/ NSAIDs/ RCA. Monitor BMP.  -UOP now -ve   - Currently SCr at 1.32, downtrending, GFR at 52   -Nephrology on board Dr. Rodriguez     GI:  -RLL likely aspiration  - Had an Aspiration event after NG tube placed and subsequently removed by pt  - Aspiration precautions, speech and swallow recs, based on cineesophagogram  - Cholelithiasis noted on RUQ US   - Patient is having 2 lose stools daily  -however patient is now intubated   - NPO with tube feed     #shock liver  most likely due to ischemic liver injury   improving currently AST, ALT in 200's and 300's   maintain BP  f/u  RUQ US     Heme:  - no indication for transfusion currently  - admitted with Hb 12.7 in August; small downtrend in the setting of CKD   - Fe panel shows deficiency; will hold repletion during acute infection  - Likely component of ACD, CKD  - patient noted to be bleeding from Left PICC line, Hgb down trending from 8.5 to 7.8, currently 8.4  - will start Lovenox   - Tranfuse if Hb<7 or if worsening tachy/HF sx     Endo:  - target CBG < 180 controlled NPO today  -On tube feeds  - C/w Sliding Scale, accuchecks ACHS     Dispo:  - monitor in the ICU   - PT FULL CODE - confirmed w/ Daughter Mrs. Arabella Oliva

## 2021-10-13 NOTE — PROGRESS NOTE ADULT - ASSESSMENT
A:   s/p R 5th ray resection - 8/19  s/p R 5th wound debridement, graft application, bone biopsy and wound vac application 9/22       P:  Pt seen and evaluated   Pt in ICU for worsening hypoxia and dyspnea   Wound vac removed   Applied adaptic and santyl to the right dorsal wound  Applied adaptic and santyl to the lateral aspect of the right foot   Dressed right foot with DSD   Continue abx per ID recommendation  Continue offloading lower extremities in CAIR boots   Pod plan: continue with LWC   Will follow while in house  Discussed with attending Dr. Vazquez

## 2021-10-13 NOTE — PROGRESS NOTE ADULT - SUBJECTIVE AND OBJECTIVE BOX
Podiatry Interval: Pt seen at bedside in ICU. Unable to attain history from pt.     Podiatry HPI: 76 year old male with a PMHx of DM, HTN, HLD, CAD to ED presents to the ED for a Right Foot s/p 5th foot partial ray resection and fillet toe flap. Patient states that he has sharp localized non-radiating pain to the Right foot 5th metatarsal. States that after his surgery, he did not see a podiatrist and he came into the ED because he saw drainage and was having pain. Denies any constitutional symptoms of N/V/C/F/SOB. Denies any other pedal complaints at this time. Denies calf pain today, bilaterally.      Medications aspirin  chewable 81 milliGRAM(s) Oral daily  atorvastatin 80 milliGRAM(s) Oral at bedtime  chlorhexidine 2% Cloths 1 Application(s) Topical <User Schedule>  cyanocobalamin 1000 MICROGram(s) Oral daily  enoxaparin Injectable 90 milliGRAM(s) SubCutaneous two times a day  ergocalciferol 10969 Unit(s) Oral <User Schedule>  ferrous    sulfate Liquid 300 milliGRAM(s) Enteral Tube daily  finasteride 5 milliGRAM(s) Oral daily  insulin lispro (ADMELOG) corrective regimen sliding scale   SubCutaneous every 6 hours  meropenem  IVPB 1000 milliGRAM(s) IV Intermittent every 12 hours  metoprolol tartrate 25 milliGRAM(s) Oral two times a day  ondansetron Injectable 4 milliGRAM(s) IV Push three times a day PRN  pantoprazole  Injectable 40 milliGRAM(s) IV Push at bedtime  sodium chloride 0.9% lock flush 10 milliLiter(s) IV Push every 1 hour PRN  vancomycin  IVPB 1500 milliGRAM(s) IV Intermittent daily    FH: Family history of acute myocardial infarction (Father)    Family history of diabetes mellitus (Mother, Sibling)    Family history of hypertension (Mother, Sibling)    Family history of hyperlipidemia (Mother, Sibling)    ,   PMH: HTN (hypertension)    HLD (hyperlipidemia)    DM (diabetes mellitus)    CAD (coronary artery disease)    Glaucoma, angle-closure    [place] (hard of hearing)       PSH: No significant past surgical history    S/P CABG (coronary artery bypass graft)    History of thyroid surgery        Labs                          8.6    6.34  )-----------( 182      ( 13 Oct 2021 04:58 )             27.3      10-13    145  |  111<H>  |  19<H>  ----------------------------<  199<H>  4.2   |  28  |  1.31<H>    Ca    8.6      13 Oct 2021 04:58  Phos  2.2     10-13  Mg     2.1     10-13    TPro  6.5  /  Alb  2.1<L>  /  TBili  0.5  /  DBili  x   /  AST  153<H>  /  ALT  227<H>  /  AlkPhos  180<H>  10-13     Vital Signs Last 24 Hrs  T(C): 37.2 (13 Oct 2021 12:00), Max: 37.4 (12 Oct 2021 19:15)  T(F): 98.9 (13 Oct 2021 12:00), Max: 99.3 (12 Oct 2021 19:15)  HR: 85 (13 Oct 2021 12:25) (75 - 97)  BP: 125/58 (13 Oct 2021 12:00) (125/58 - 160/70)  BP(mean): 75 (13 Oct 2021 12:00) (72 - 98)  RR: 23 (13 Oct 2021 12:25) (14 - 27)  SpO2: 97% (13 Oct 2021 12:25) (97% - 100%)  Sedimentation Rate, Erythrocyte: 85 mm/Hr (10-08-21 @ 03:52)  Sedimentation Rate, Erythrocyte: 108 mm/Hr (10-02-21 @ 07:02)         C-Reactive Protein, Serum: 53 mg/L (10-08-21 @ 12:10)  C-Reactive Protein, Serum: 43 mg/L (10-02-21 @ 14:05)  C-Reactive Protein, Serum: 45 mg/L (09-16-21 @ 23:33)  C-Reactive Protein, Serum: 85 mg/L (08-22-21 @ 21:30)  C-Reactive Protein, Serum: 67 mg/L (08-15-21 @ 11:55)   WBC Count: 6.34 K/uL (10-13-21 @ 04:58)    PTT - ( 12 Oct 2021 04:34 )  PTT:91.7 sec    CAPILLARY BLOOD GLUCOSE      POCT Blood Glucose.: 191 mg/dL (13 Oct 2021 11:17)  POCT Blood Glucose.: 176 mg/dL (12 Oct 2021 23:13)  POCT Blood Glucose.: 165 mg/dL (12 Oct 2021 17:18)    ROS: All others negative unless otherwise stated in the HPI      PHYSICAL EXAM  LE Focused:    Vasc:  DP/PT pulses were faintly palpable, bilateral. DP/PT pulses were monophasic on doppler, bilaterally. CFT<3 seconds to all digits.   Derm: Surgical site with graft in place to R 5th ray, granular wound bed through the graft with patches of necrotic islands noted, minimal drainage or discharge. minimal erythema and edema noted, eschar forming over the wound. Dorsal foot wound noted with tendon exposed, DTI noted to b/l heel  Neuro: Protective sensation grossly diminished, b/l  MSK: 5/5 muscle strength noted to the pedal compartments. 5th digit amputation R foot        Imaging:    3 views right foot. Prior 8/20/2021.    Status post resection of the fifth toe and distal fifth metatarsal unchanged in appearance. No focal bone lysis or unusual periosteal reaction to suggest osteomyelitis. No acute fracture or dislocation. The remainder study unchanged. No soft tissue gas.    IMPRESSION: No acute finding. No plain film evidence of osteomyelitis.      MRI R foot:     INTERPRETATION:  Clinical Information: Recent fifth metatarsal head resection now with fifth digit pain, swelling and foul discharge.    Comparison: Radiographs of the right foot from 9/16/2021 and MRI the right foot from 8/16/2021.    Technique:  MRI of the right midfoot and forefoot.  Intravenous Contrast: None.    Findings:    There is a resection at the mid aspect of the fifth metatarsal shaft. There is a lateral soft tissue wound beginning at the level of the amputation which extends distally. There is susceptibility artifact in the region of the amputation consistent with postoperative change. There is hyperintense T2 marrow signal throughout the remaining fifth metatarsal and within the adjacent fourth metatarsal head which is nonspecific and could be related to recent postoperative changes although osteomyelitis is suspected.    There is edema and mild atrophy within the plantar muscles of the foot. There is minimal spurring at the first metatarsophalangeal and hallux sesamoid articulations.    Impression:  Resection at the mid aspect of the fifth metatarsal. Lateral soft tissue wound with marrow signal abnormality throughout the fifth metatarsal and within the adjacent fourth metatarsal head which is nonspecific in the setting of recent surgery although osteomyelitis is suspected.        Culture Results:     Rare Aeromonas hydrophila/caviae   Few Klebsiella oxytoca/Raoutella ornithinolytica   Few Enterococcus faecalis   Few Streptococcus agalactiae (Group B) isolated   Group B streptococci are susceptible to ampicillin,   penicillin and cefazolin, but may be resistant to   erythromycin and clindamycin.   Recommendations for intrapartum prophylaxis for Group B   streptococci are penicillin or ampicillin. (09.16.21 @ 21:42)     Gram Stain:   No polymorphonuclear cells seen per low power field   No organisms seen per oil power field (09.22.21 @ 13:54)       Historical Values  Gram Stain:   No polymorphonuclear cells seen per low power field   No organisms seen per oil power field (09.22.21 @ 13:54)   Gram Stain:   No polymorphonuclear leukocytes seen per low power field   No organisms seen per oil power field (08.20.21 @ 03:59)

## 2021-10-14 LAB
ALBUMIN SERPL ELPH-MCNC: 2 G/DL — LOW (ref 3.5–5)
ALP SERPL-CCNC: 142 U/L — HIGH (ref 40–120)
ALT FLD-CCNC: 154 U/L DA — HIGH (ref 10–60)
ANION GAP SERPL CALC-SCNC: 4 MMOL/L — LOW (ref 5–17)
AST SERPL-CCNC: 77 U/L — HIGH (ref 10–40)
BILIRUB SERPL-MCNC: 0.6 MG/DL — SIGNIFICANT CHANGE UP (ref 0.2–1.2)
BUN SERPL-MCNC: 17 MG/DL — SIGNIFICANT CHANGE UP (ref 7–18)
CALCIUM SERPL-MCNC: 8.7 MG/DL — SIGNIFICANT CHANGE UP (ref 8.4–10.5)
CHLORIDE SERPL-SCNC: 111 MMOL/L — HIGH (ref 96–108)
CO2 SERPL-SCNC: 29 MMOL/L — SIGNIFICANT CHANGE UP (ref 22–31)
CREAT SERPL-MCNC: 0.99 MG/DL — SIGNIFICANT CHANGE UP (ref 0.5–1.3)
GLUCOSE SERPL-MCNC: 133 MG/DL — HIGH (ref 70–99)
HCT VFR BLD CALC: 26.4 % — LOW (ref 39–50)
HGB BLD-MCNC: 8.3 G/DL — LOW (ref 13–17)
MAGNESIUM SERPL-MCNC: 2.1 MG/DL — SIGNIFICANT CHANGE UP (ref 1.6–2.6)
MCHC RBC-ENTMCNC: 29.6 PG — SIGNIFICANT CHANGE UP (ref 27–34)
MCHC RBC-ENTMCNC: 31.4 GM/DL — LOW (ref 32–36)
MCV RBC AUTO: 94.3 FL — SIGNIFICANT CHANGE UP (ref 80–100)
NRBC # BLD: 0 /100 WBCS — SIGNIFICANT CHANGE UP (ref 0–0)
PHOSPHATE SERPL-MCNC: 2.8 MG/DL — SIGNIFICANT CHANGE UP (ref 2.5–4.5)
PLATELET # BLD AUTO: 172 K/UL — SIGNIFICANT CHANGE UP (ref 150–400)
POTASSIUM SERPL-MCNC: 4.3 MMOL/L — SIGNIFICANT CHANGE UP (ref 3.5–5.3)
POTASSIUM SERPL-SCNC: 4.3 MMOL/L — SIGNIFICANT CHANGE UP (ref 3.5–5.3)
PROT SERPL-MCNC: 6.1 G/DL — SIGNIFICANT CHANGE UP (ref 6–8.3)
RBC # BLD: 2.8 M/UL — LOW (ref 4.2–5.8)
RBC # FLD: 14.9 % — HIGH (ref 10.3–14.5)
SODIUM SERPL-SCNC: 144 MMOL/L — SIGNIFICANT CHANGE UP (ref 135–145)
VANCOMYCIN TROUGH SERPL-MCNC: 21.8 UG/ML — HIGH (ref 10–20)
WBC # BLD: 5.84 K/UL — SIGNIFICANT CHANGE UP (ref 3.8–10.5)
WBC # FLD AUTO: 5.84 K/UL — SIGNIFICANT CHANGE UP (ref 3.8–10.5)

## 2021-10-14 PROCEDURE — 99231 SBSQ HOSP IP/OBS SF/LOW 25: CPT

## 2021-10-14 PROCEDURE — 99232 SBSQ HOSP IP/OBS MODERATE 35: CPT

## 2021-10-14 PROCEDURE — 71045 X-RAY EXAM CHEST 1 VIEW: CPT | Mod: 26

## 2021-10-14 RX ORDER — VANCOMYCIN HCL 1 G
1250 VIAL (EA) INTRAVENOUS DAILY
Refills: 0 | Status: DISCONTINUED | OUTPATIENT
Start: 2021-10-15 | End: 2021-10-17

## 2021-10-14 RX ORDER — HYDROMORPHONE HYDROCHLORIDE 2 MG/ML
1 INJECTION INTRAMUSCULAR; INTRAVENOUS; SUBCUTANEOUS ONCE
Refills: 0 | Status: DISCONTINUED | OUTPATIENT
Start: 2021-10-14 | End: 2021-10-14

## 2021-10-14 RX ORDER — VANCOMYCIN HCL 1 G
1250 VIAL (EA) INTRAVENOUS EVERY 12 HOURS
Refills: 0 | Status: DISCONTINUED | OUTPATIENT
Start: 2021-10-14 | End: 2021-10-14

## 2021-10-14 RX ORDER — GABAPENTIN 400 MG/1
100 CAPSULE ORAL ONCE
Refills: 0 | Status: COMPLETED | OUTPATIENT
Start: 2021-10-14 | End: 2021-10-14

## 2021-10-14 RX ORDER — APIXABAN 2.5 MG/1
5 TABLET, FILM COATED ORAL
Refills: 0 | Status: DISCONTINUED | OUTPATIENT
Start: 2021-10-14 | End: 2021-10-24

## 2021-10-14 RX ORDER — GABAPENTIN 400 MG/1
100 CAPSULE ORAL THREE TIMES A DAY
Refills: 0 | Status: DISCONTINUED | OUTPATIENT
Start: 2021-10-14 | End: 2021-11-03

## 2021-10-14 RX ORDER — NIFEDIPINE 30 MG
60 TABLET, EXTENDED RELEASE 24 HR ORAL DAILY
Refills: 0 | Status: DISCONTINUED | OUTPATIENT
Start: 2021-10-14 | End: 2021-11-03

## 2021-10-14 RX ADMIN — APIXABAN 5 MILLIGRAM(S): 2.5 TABLET, FILM COATED ORAL at 17:12

## 2021-10-14 RX ADMIN — ENOXAPARIN SODIUM 90 MILLIGRAM(S): 100 INJECTION SUBCUTANEOUS at 05:01

## 2021-10-14 RX ADMIN — HYDROMORPHONE HYDROCHLORIDE 1 MILLIGRAM(S): 2 INJECTION INTRAMUSCULAR; INTRAVENOUS; SUBCUTANEOUS at 23:53

## 2021-10-14 RX ADMIN — Medication 300 MILLIGRAM(S): at 11:03

## 2021-10-14 RX ADMIN — Medication 25 MILLIGRAM(S): at 17:12

## 2021-10-14 RX ADMIN — FINASTERIDE 5 MILLIGRAM(S): 5 TABLET, FILM COATED ORAL at 11:03

## 2021-10-14 RX ADMIN — MEROPENEM 100 MILLIGRAM(S): 1 INJECTION INTRAVENOUS at 05:00

## 2021-10-14 RX ADMIN — GABAPENTIN 100 MILLIGRAM(S): 400 CAPSULE ORAL at 11:31

## 2021-10-14 RX ADMIN — PANTOPRAZOLE SODIUM 40 MILLIGRAM(S): 20 TABLET, DELAYED RELEASE ORAL at 22:58

## 2021-10-14 RX ADMIN — Medication 81 MILLIGRAM(S): at 11:03

## 2021-10-14 RX ADMIN — GABAPENTIN 100 MILLIGRAM(S): 400 CAPSULE ORAL at 16:32

## 2021-10-14 RX ADMIN — Medication 1 APPLICATION(S): at 12:05

## 2021-10-14 RX ADMIN — Medication 25 MILLIGRAM(S): at 05:02

## 2021-10-14 RX ADMIN — ATORVASTATIN CALCIUM 80 MILLIGRAM(S): 80 TABLET, FILM COATED ORAL at 22:58

## 2021-10-14 RX ADMIN — PREGABALIN 1000 MICROGRAM(S): 225 CAPSULE ORAL at 11:03

## 2021-10-14 RX ADMIN — GABAPENTIN 100 MILLIGRAM(S): 400 CAPSULE ORAL at 22:58

## 2021-10-14 RX ADMIN — MEROPENEM 100 MILLIGRAM(S): 1 INJECTION INTRAVENOUS at 17:12

## 2021-10-14 RX ADMIN — CHLORHEXIDINE GLUCONATE 1 APPLICATION(S): 213 SOLUTION TOPICAL at 05:01

## 2021-10-14 NOTE — PROGRESS NOTE ADULT - SUBJECTIVE AND OBJECTIVE BOX
[facility] NEPHROLOGY- PROGRESS NOTE    Patient is a 77yo Male with DM on insulin, HTN, HLD, CAD, s/p R partial 5th ray amputation 8/19/2021 p/w R foot pain and foul drainage a/w diabetic foot ulcer suspicious for osteomyelitis. s/p OR debridement today. Nephrology consulted for abrupt rise in SCr.   s/p ibrahim placement for distended bladder on 9/23 with ~400ml of urine o/p  9/27- pt with SOB; CXR with pulmonary edema- pt given Lasix 80mg IV x1. Concern for aspiration PNA  Course BP: s/p lasix 60mg IV on 10/1 & 10/2 and s/p Lasix 40mg IV x1 today 10/4. Bladder scan with 1L urine; s/p ibrahim reinserted  Transferred to ICU for acute hypoxic respiratory failure, s/p intubated on 10/7, s/p extubated 10/13    Hospital Medications: Medications reviewed.  REVIEW OF SYSTEMS: Pt denies any SOB, chest pain, n/v/d, abd pain or LE edema    VITALS:  T(F): 99.7 (10-14-21 @ 12:00), Max: 99.9 (10-13-21 @ 23:00)  HR: 76 (10-14-21 @ 12:00)  BP: 131/54 (10-14-21 @ 12:00)  RR: 18 (10-14-21 @ 12:00)  SpO2: 100% (10-14-21 @ 12:00)  Wt(kg): --    10-13 @ 07:01  -  10-14 @ 07:00  --------------------------------------------------------  IN: 915 mL / OUT: 1575 mL / NET: -660 mL    10-14 @ 07:01  -  10-14 @ 14:37  --------------------------------------------------------  IN: 300 mL / OUT: 260 mL / NET: 40 mL      PHYSICAL EXAM:  Gen: Not answering questions  Cards: RRR, +S1/S2, +TRINIDAD  Resp: CTA ant +rt pigtail catheter  GI: soft,   : +ibrahim  Extremities: no LE edema B/L  Derm: Rt foot wrapped with wound vac    LABS:  10-14    144  |  111<H>  |  17  ----------------------------<  133<H>  4.3   |  29  |  0.99    Ca    8.7      14 Oct 2021 03:44  Phos  2.8     10-14  Mg     2.1     10-14    TPro  6.1  /  Alb  2.0<L>  /  TBili  0.6  /  DBili      /  AST  77<H>  /  ALT  154<H>  /  AlkPhos  142<H>  10-14    Creatinine Trend: 0.99 <--, 1.31 <--, 1.43 <--, 1.62 <--, 1.95 <--, 2.45 <--, 3.03 <--                        8.3    5.84  )-----------( 172      ( 14 Oct 2021 03:44 )             26.4     Urine Studies:

## 2021-10-14 NOTE — SWALLOW BEDSIDE ASSESSMENT ADULT - SLP PERTINENT HISTORY OF CURRENT PROBLEM
76M from home, lives with daughter w/ PMH DM on insulin, HTN, HLD, CAD, PSH R partial 5th ray amputation 8/19/2021. Pt reportedly p/w R foot pain x1 day associated with foul smelling discharge. Pt reportedly with sharp pain on the R lateral foot. As per daughter, pt was reportedly taking tylenol which did not help his pain prompting the pt to ask his daughter to take him to the hospital. Pt is a poor historian. Daughter reportedly states that the patient did not see his podiatrist after the surgery. No fever, chest, pain, palpitations, nausea, vomiting, diarrhea.
76M from home, lives with daughter w/ PMH DM on insulin, HTN, HLD, CAD, PSH R partial 5th ray amputation 8/19/2021. Pt reportedly p/w R foot pain x1 day associated with foul smelling discharge. Pt reportedly with sharp pain on the R lateral foot. As per daughter, pt was reportedly taking tylenol which did not help his pain prompting the pt to ask his daughter to take him to the hospital. Pt is a poor historian. Daughter reportedly states that the patient did not see his podiatrist after the surgery. No fever, chest, pain, palpitations, nausea, vomiting, diarrhea

## 2021-10-14 NOTE — CHART NOTE - NSCHARTNOTEFT_GEN_A_CORE
<Hospital course>  Patient is a 76 year old male with PMH of poorly controlled IDDM, HTN, HLD, stage III CKD, CAD s/p CABG and recent right partial 5th foot amputation (8/19/21) who presented to ED on 9/17  with c/o right foot pain associated with discharge and foul odor. Of note, the patient never followed up with podiatry after his procedure in August. As per daughter, pt was taking Tylenol which did not help his pain prompting the patient to ask his daughter to take him to the hospital. Pt is a poor historian but baseline . Daughter states that the patient did not see his podiatrist after the surgery. No fever, chest, pain, palpitations, nausea, vomiting, diarrhea. Patient was admitted for osteomyelitis.     INTERVAL HPI/ HOSPITAL COURSE   MRI confirms suspected osteomyelitis of the R foot - patient followed by podiatry and ID; and underwent debridement and wound VAC placement on R foot. He was started on Unasyn and Levofloxacin as per ID. Surgical pathology resulted OM, and wound culture resulting Enterococcus Aeromonal and Klebsiella- treated initially with Zosyn. He developed OREN likely mixed 2/2 to a mixed etiology with prerenal from active infection/ pulmonary edema, intrarenal due to toxicity from IV antibiotics and post renal from urinary retention, Nephrology consulted for optimization of kidney function. IV antibiotic regimen switched to Unasyn and Levaquin, sensitive for cultured organisms; requiring 6 weeks of IV antibiotics. Patient subsequently developed Pulmonary Edema and was treated with IV lasix. Cardiology consulted and recommending SABIHA, due to severe Aortic Stenosis based on Echo with L/R heart cath once infection clears and medically stable. Hospital course further complicated by abdominal distention and constipation for which he received lactulose and had vomiting. AXR shows distended loops of bowel for which NG tube was placed which the patient removed overnight 9/26 - re-attempts to replace NG tube were unsuccessful as patient was non-cooperative. The patient had subsequent worsening respiratory distress and is suspected to have aspirated as CXR showed possible RLL PNA. Surgery consulted. CT abdomen deferred for now as patient is unstable and will not tolerate lying flat. CT chest done showing R>L pulmonary edema vs Pneumonia; patient saturating high 80%'s Spo2 on 10L Pendant. Pulm (DR. Xavier) consulted for possible thoracentesis, and recommended to start albumin + lasix. ICU was consulted for worsening respiratory status. Patient denies any current trouble breathing, chest pain, or cough.       <ICU course>    Patient is stable for downgrade to floor under care of  ------------- for further management , covering resident ---------- was informed.    <Things to follow up>. <Hospital course>  Patient is a 76 year old male with PMH of poorly controlled IDDM, HTN, HLD, stage III CKD, CAD s/p CABG and recent right partial 5th foot amputation (8/19/21) who presented to ED on 9/17  with c/o right foot pain associated with discharge and foul odor. Of note, the patient never followed up with podiatry after his procedure in August. As per daughter, pt was taking Tylenol which did not help his pain prompting the patient to ask his daughter to take him to the hospital. Pt is a poor historian but baseline . Daughter states that the patient did not see his podiatrist after the surgery. No fever, chest, pain, palpitations, nausea, vomiting, diarrhea. Patient was admitted for osteomyelitis.     INTERVAL HPI/ HOSPITAL COURSE   MRI confirms suspected osteomyelitis of the R foot - patient followed by podiatry and ID; and underwent debridement and wound VAC placement on R foot. He was started on Unasyn and Levofloxacin as per ID. Surgical pathology resulted OM, and wound culture resulting Enterococcus Aeromonal and Klebsiella- treated initially with Zosyn. He developed OREN likely mixed 2/2 to a mixed etiology with prerenal from active infection/ pulmonary edema, intrarenal due to toxicity from IV antibiotics and post renal from urinary retention, Nephrology consulted for optimization of kidney function. IV antibiotic regimen switched to Unasyn and Levaquin, sensitive for cultured organisms; requiring 6 weeks of IV antibiotics. Patient subsequently developed Pulmonary Edema and was treated with IV lasix. Cardiology consulted and recommending SABIHA, due to severe Aortic Stenosis (based on Echo) with L/R heart cath once infection clears and medically stable. Hospital course further complicated by abdominal distention and constipation for which he received lactulose and had vomiting. AXR shows distended loops of bowel for which NG tube was placed which the patient removed overnight 9/26 - re-attempts to replace NG tube were unsuccessful as patient was non-cooperative. The patient had subsequent worsening respiratory distress and is suspected to have aspirated as CXR showed possible RLL PNA. Surgery consulted. CT abdomen deferred for now as patient is unstable and will not tolerate lying flat. CT chest done showing R>L pulmonary edema vs Pneumonia; patient saturating high 80%'s Spo2 on 10L Pendant. Pulm (DR. Xavier) consulted for possible thoracentesis, and recommended to start albumin + lasix. ICU was consulted for worsening respiratory status. Patient was admitted to ICU on October 5 for AHRF secondary to Aspiration PNA.       <ICU course>  During ICU course patient was seen by Interventional Radiology for chest pigtail catheter placement, however as of October 7 patient was not hemodynamically stable to undergo chest tube placement and later intubated. Patient was intubated on October 7, 2021 due to worsening respiratory status. Patient was also started on pressors (pheny). Patient also had left femoral central line placement on the same day. Patient had right chest tube placement done by thoracic surgery due to his b/l pleural effusion. Patient was also seen by hepatology due to his elevated liver enzymes, most likely for his ischemic liver injury. Patient underwent another central line placement LI on 10/9 and patient remained hemodynamically stable with pressor. Nephrology was also consulted due to the increase in serum creatinine. Patient was continued to be managed with vancomycin and meropenem. Patient was taken off pressors and remained stable. Patient was found to have Afib and anticoagulation heparin drip was started which was later to changed to Lovenox as renal function improved and later changed to Eliquis. Patient remained in normal sinus rhythm. Patient's liver function improved and enzymes were downtrending. Patient was extubated on 10/13 with no complications. Patient remained stable. On 10/14 NGT discontinued with tube feedings and speech and swallow evaluated the patient and a pureed diet was initiated. Patient still remains on vancomycin and meropenem, and is renally dosed. Patient will have left chest tube removed as per thoracic surgery. On 10.14 patient is stable to downgrade to a medical floor.       Patient is stable for downgrade to floor under care of Dr. Samaniego for further management , covering resident ---------- was informed.    <Things to follow up>.  - Oxygenation needs  - ID f/u  - PT eval  -Continue Anticoagulation  - f/u for Thoracic for chest tube removal   - f/u SABIHA evaluation <Hospital course>  Patient is a 76 year old male with PMH of poorly controlled IDDM, HTN, HLD, stage III CKD, CAD s/p CABG and recent right partial 5th foot amputation (8/19/21) who presented to ED on 9/17  with c/o right foot pain associated with discharge and foul odor. Of note, the patient never followed up with podiatry after his procedure in August. As per daughter, pt was taking Tylenol which did not help his pain prompting the patient to ask his daughter to take him to the hospital. Pt is a poor historian but baseline . Daughter states that the patient did not see his podiatrist after the surgery. No fever, chest, pain, palpitations, nausea, vomiting, diarrhea. Patient was admitted for osteomyelitis.     INTERVAL HPI/ HOSPITAL COURSE   MRI confirms suspected osteomyelitis of the R foot - patient followed by podiatry and ID; and underwent debridement and wound VAC placement on R foot. He was started on Unasyn and Levofloxacin as per ID. Surgical pathology resulted OM, and wound culture resulting Enterococcus Aeromonal and Klebsiella- treated initially with Zosyn. He developed OREN likely mixed 2/2 to a mixed etiology with prerenal from active infection/ pulmonary edema, intrarenal due to toxicity from IV antibiotics and post renal from urinary retention, Nephrology consulted for optimization of kidney function. IV antibiotic regimen switched to Unasyn and Levaquin, sensitive for cultured organisms; requiring 6 weeks of IV antibiotics. Patient subsequently developed Pulmonary Edema and was treated with IV lasix. Cardiology consulted and recommending SABIHA, due to severe Aortic Stenosis (based on Echo) with L/R heart cath once infection clears and medically stable. Hospital course further complicated by abdominal distention and constipation for which he received lactulose and had vomiting. AXR shows distended loops of bowel for which NG tube was placed which the patient removed overnight 9/26 - re-attempts to replace NG tube were unsuccessful as patient was non-cooperative. The patient had subsequent worsening respiratory distress and is suspected to have aspirated as CXR showed possible RLL PNA. Surgery consulted. CT abdomen deferred for now as patient is unstable and will not tolerate lying flat. CT chest done showing R>L pulmonary edema vs Pneumonia; patient saturating high 80%'s Spo2 on 10L Pendant. Pulm (DR. Xavier) consulted for possible thoracentesis, and recommended to start albumin + lasix. ICU was consulted for worsening respiratory status. Patient was admitted to ICU on October 5 for AHRF secondary to Aspiration PNA.     <ICU course>  During ICU course patient was seen by Interventional Radiology for chest pigtail catheter placement, however as of October 7 patient was not hemodynamically stable to undergo chest tube placement and later intubated. Patient was intubated on October 7, 2021 due to worsening respiratory status. Patient was also started on pressors (pheny). Patient also had left femoral central line placement on the same day. Patient had right chest tube placement done by thoracic surgery due to his b/l pleural effusion. Patient was also seen by hepatology due to his elevated liver enzymes, most likely for his ischemic liver injury. Patient underwent another central line placement LI on 10/9 and patient remained hemodynamically stable with pressor. Nephrology was also consulted due to the increase in serum creatinine. Patient was continued to be managed with vancomycin and meropenem. Patient was taken off pressors and remained stable. Patient was found to have Afib and anticoagulation heparin drip was started which was later to changed to Lovenox as renal function improved and later changed to Eliquis. Patient remained in normal sinus rhythm. Patient's liver function improved and enzymes were downtrending. Patient was extubated on 10/13 with no complications. Patient remained stable. On 10/14 NGT discontinued with tube feedings and speech and swallow evaluated the patient and a pureed diet was initiated. Patient still remains on vancomycin and meropenem, and is renally dosed. Patient will have left chest tube removed as per thoracic surgery. On 10.14 patient is stable to downgrade to a medical floor.       Patient is stable for downgrade to floor under care of Dr. Samaniego for further management , covering NP Debbie was informed.    <Things to follow up>.  - Oxygenation needs  - ID f/u  - PT eval  -Continue Anticoagulation  - f/u for Thoracic for chest tube removal   - f/u SABIHA evaluation

## 2021-10-14 NOTE — PROGRESS NOTE ADULT - ASSESSMENT
76M with b/l Pleural effusion, right greater than left, s/p R pigtail placement 10/8    - Continue pigtail to waterseal, monitor output   - F/u AM CXR  - Care per ICU team  76M with b/l Pleural effusion, right greater than left, s/p R pigtail placement 10/8    - Continue pigtail to waterseal, monitor output   - F/u AM CXR  - If output continues to decrease, will consider removing pigtail    - Care per ICU team

## 2021-10-14 NOTE — PROGRESS NOTE ADULT - ASSESSMENT
Patient is a 75yo male with hx of DM, HTN, HLD, CKD (Stage 3), CAD, s/p CABG and Right partial 5th foot amp (8/21) who intially presented to the ED of right foot pain, found to have osteomyelitis. ICU was consulted for AHRF 2/2 to Asp PNA requiring BIPAP, patient intubated on 10/7. Patient extubated on 10/13. Patient stable for downgrade    Plan:  Neuro:  - Off sedation, following commands, alert and oriented x2   - monitor neurological status,    - extubated      CVS:  # Severe Aortic Stenosis   - Echo 8/15  confirmed EF 55-60%, Severe LVH, g1 diastolic dysfunction, and severe AS   -Murmurs on exam, systolic  -Cardiology on board SABIHA once stable    # combined systolic and diastolic heart failure  - CT Chest with pulm edema bilaterally; moderate to large pleural effusions; atelectasis vs PNA   - Echo 8/15  confirmed EF 55-60%, Severe LVH, g1 diastolic dysfunction, and severe AS   - new 10/8/2021  EF 40%  - BNP 19K with worsening overload as above; 9K today pt with poor cardiac reserve worsened by acute infection   - CXR  this admission showing improved R sided pleural effusion with diuresis  - Avoid drugs which increase afterload         #CAD   - S/p CABG, on ASA   - Continue Toprol 100 Qd, Procardia 60XL once stable  - Positive trop 1.46->1.550; likely due to increased demand and renal failure.  -Now down trended 1.05  - Echo 10/8/2021 EF 40%  - Will need a SABIHA and R+L heart cath once medically optimized  - C/w ASA, statin, lopressor   - Cardiology Dr. Wilkins       #Afib   currently resolved, currently in sinus rhythm   The EKG and tele record A-fib with RVR, and left bundle   Afib likely new, old EKGs say sinus rhythm with nonspecific IVCD, LAPB  Hx of CABG  patient was on Heparin drip, noted to be bleeding from Left PICC line, bleeding resolved  no new bleeding noted will start Lovenox 90mg BID, due to improved renal function  will start Lopressor 25mg BID with hold parameters and will start Eliquis     #HTN  - patient noted to be [id number]  - will restart home medication Nifedipine 60mg and Lopressor 25mg  - monitor BP, if still uncontrolled will titrate Lopressor as needed       Pulm:  - Acute Hypoxic/ Hypercapnic Resp Failure 2/2  1. RLL PNA vs  2. Right pleural effusions vs  3. CHF    - Patient was on Pendant, saturating 90% on 10L pendant, but having increased WOB, tachypnea   - Possible left lower lobe pneumonia and bilateral pleural effusions on CT Chest   - ABG was showing 7.49/33/52/25-> respiratory alkalosis with hypoxic   -ICU  Bipap 10/5 for hypoxia and work of breathing,  -Got intubated 10/7/2021  -pt also had CABG hx and BNP 19k - 9K this am=dmission  - Per surgery, Tello catheter placed without issue and drained 1000cc immediately and clamped.  - Sent fluid for tests/ cultures LDH, cytology, seems transudative   - chest tube currently on water seal, f/u with thoracic to remove chest tube, today 10/14/21 contacted thoracic to remove chest tube, they are aware of the drainage of 20cc/24 hrs and said "tomorrow they will remove".   -c/w with abx ( Vanc and Meropenem ), adjust dosing per renal function   - avoid fluid overloading   - extubated 10/13 with 2L NC saturating 91-96%  - will continue to monitor     ID:  - Empiric Aspiration PNA coverage, R foot osteomyelitis, and Funguria   - Blood Cultures -ve till date, Bone and wound culture: showing Aeromonas, GBS, Klebsiella, Enterococcus   -Cultures from 9/22/2021, tissue and blood 9/17 negative  - UCX showing yeast - one source  - On Unasyn, Levaquin, Diflucan; renally dosed - all >12 days  -DC diflucan as it is only one source, also given today's LFTs  -DC Levaquin  -Continue on vanc and faustino with renal dosing, will dose vanc if trough is normal through BID dosing   -f/u vanc trough 10/14/21 at 11:30AM      Nephro:  - Ibrahim placed for possible obstruction   - OREN on CKD stage III: ATN was resolving, however renal fxn now worsened.   - Kidney/ Bladder US with large distended bladder s/p ibrahim placement on 9/23, then removed.   - S/p bladder scan 10/4 with 1L urine for which Ibrahim was reinserted  - FeUrea: 20.5% 9/23:   - Less likely due to obstructive uropathy.  No peripheral eosinophilia- no signs of AIN.   - Avoid nephrotoxins/ NSAIDs/ RCA. Monitor BMP.  -UOP now -ve   - Currently SCr at 0.99 GFR increased to 73, renal function improving   -Nephrology on board Dr. Rodriguez     GI:  -RLL likely aspiration  - Had an Aspiration event after NG tube placed and subsequently removed by pt  - Aspiration precautions, speech and swallow recs, based on cineesophagogram  - Cholelithiasis noted on RUQ US   - Patient is having 2 lose stools daily  -however patient is now intubated   - NPO with tube feed   - Speech and swallow evaluation, d/c NGT and resume diet per S/S reccs    #shock liver  most likely due to ischemic liver injury   improving currently AST 77, , Alk Phos 142  maintain BP  - if worsening can consider to hold statin, but currently improving     Heme:  - no indication for transfusion currently  - admitted with Hb 12.7 in August; small downtrend in the setting of CKD   - Fe panel shows deficiency; will hold repletion during acute infection  - Likely component of ACD, CKD  - patient noted to be bleeding from Left PICC line, Hgb down trending from 8.5 to 7.8, currently 8.3  - will start Eliquis,   - Tranfuse if Hb<7 or if worsening tachy/HF sx     MSK:  - patient complaning of b/l foot pain  - will start Gabapentin 100mg BID PO   - monitor     Endo:  - target CBG < 180   -On tube feeds, awaiting S/S evaluation   - C/w Sliding Scale, accuchecks ACHS     Dispo:  - monitor in the ICU   - PT FULL CODE - confirmed w/ Daughter Mrs. Arabella Oliva

## 2021-10-14 NOTE — PROGRESS NOTE ADULT - SUBJECTIVE AND OBJECTIVE BOX
INTERVAL HPI/OVERNIGHT EVENTS: No acute events overnight, patient is extubated. Patient is alert and following commands. Denies cp, sob. Has pain in both extremities.     PRESSORS: [ ] YES [X ] NO  WHICH:    ANTIBIOTICS:                  DATE STARTED:  ANTIBIOTICS:                  DATE STARTED:  ANTIBIOTICS:                  DATE STARTED:    Antimicrobial:  meropenem  IVPB 1000 milliGRAM(s) IV Intermittent every 12 hours  vancomycin  IVPB 1250 milliGRAM(s) IV Intermittent every 12 hours    Cardiovascular:  metoprolol tartrate 25 milliGRAM(s) Oral two times a day  NIFEdipine XL 60 milliGRAM(s) Oral daily    Pulmonary:    Hematalogic:  apixaban 5 milliGRAM(s) Oral two times a day  aspirin  chewable 81 milliGRAM(s) Oral daily    Other:  atorvastatin 80 milliGRAM(s) Oral at bedtime  chlorhexidine 2% Cloths 1 Application(s) Topical <User Schedule>  collagenase Ointment 1 Application(s) Topical daily  cyanocobalamin 1000 MICROGram(s) Oral daily  ergocalciferol 79812 Unit(s) Oral <User Schedule>  ferrous    sulfate Liquid 300 milliGRAM(s) Enteral Tube daily  finasteride 5 milliGRAM(s) Oral daily  gabapentin 100 milliGRAM(s) Oral three times a day  insulin lispro (ADMELOG) corrective regimen sliding scale   SubCutaneous every 6 hours  ondansetron Injectable 4 milliGRAM(s) IV Push three times a day PRN  pantoprazole  Injectable 40 milliGRAM(s) IV Push at bedtime  sodium chloride 0.9% lock flush 10 milliLiter(s) IV Push every 1 hour PRN      Drug Dosing Weight  Height (cm): 170.2 (17 Sep 2021 21:58)  Weight (kg): 86.9 (05 Oct 2021 21:45)  BMI (kg/m2): 30 (05 Oct 2021 21:45)  BSA (m2): 1.99 (05 Oct 2021 21:45)    CENTRAL LINE: [ ] YES [X ] NO  LOCATION:   DATE INSERTED:  REMOVE: [ ] YES [ ] NO  EXPLAIN:    CASTAÑEDA: [X ] YES [ ] NO    DATE INSERTED:  REMOVE:  [ ] YES [ ] NO  EXPLAIN:    A-LINE:  [ ] YES [X ] NO  LOCATION:   DATE INSERTED:  REMOVE:  [ ] YES [ ] NO  EXPLAIN:    PMH/Social Hx/Fam Hx -reviewed admission note, no change since admission  PAST MEDICAL & SURGICAL HISTORY:  HTN (hypertension)    HLD (hyperlipidemia)    DM (diabetes mellitus)    CAD (coronary artery disease)    Glaucoma, angle-closure    [place] (hard of hearing)    S/P CABG (coronary artery bypass graft)    History of thyroid surgery      T(C): 37.6 (10-14-21 @ 07:00), Max: 37.7 (10-13-21 @ 23:00)  HR: 78 (10-14-21 @ 09:00)  BP: 137/49 (10-14-21 @ 09:00)  BP(mean): 71 (10-14-21 @ 09:00)  ABP: --  ABP(mean): --  RR: 17 (10-14-21 @ 09:00)  SpO2: 99% (10-14-21 @ 09:00)  Wt(kg): --    ABG - ( 13 Oct 2021 04:51 )  pH, Arterial: 7.46  pH, Blood: x     /  pCO2: 37    /  pO2: 199   / HCO3: 26    / Base Excess: 2.5   /  SaO2: 100                   10-13 @ 07:01  -  10-14 @ 07:00  --------------------------------------------------------  IN: 915 mL / OUT: 1575 mL / NET: -660 mL        Mode: CPAP with PS  FiO2: 40  PEEP: 5  PS: 5  ITime: 0.9  MAP: 7  PIP: 10      PHYSICAL EXAM:  GENERAL:  Extubated, alert orientedx2, following commands   HEAD: Nomocephalic, Atraumatic  EYES: blinking, opening eyes  NECK: Supple, No JVD; Normal thyroid; Trachea midline  NERVOUS SYSTEM:  CN II-XII intact, following commands   CHEST/LUNG: No rales, rhonchi, wheezing, Chest tube noted to be on water seal, drained only 20cc   HEART: Regular rate and rhythm; Grade III Systolic murmur noted in aortic region   ABDOMEN: Soft, Nontender, Nondistended; Bowel sounds present, Rectal tube noted   : Castañeda noted to be draining clear fluid   EXTREMITIES:  2+ Peripheral Pulses, No clubbing, cyanosis, or edema  SKIN: right toe amputation no bleeding noted.      LABS:  CBC Full  -  ( 14 Oct 2021 03:44 )  WBC Count : 5.84 K/uL  RBC Count : 2.80 M/uL  Hemoglobin : 8.3 g/dL  Hematocrit : 26.4 %  Platelet Count - Automated : 172 K/uL  Mean Cell Volume : 94.3 fl  Mean Cell Hemoglobin : 29.6 pg  Mean Cell Hemoglobin Concentration : 31.4 gm/dL  Auto Neutrophil # : x  Auto Lymphocyte # : x  Auto Monocyte # : x  Auto Eosinophil # : x  Auto Basophil # : x  Auto Neutrophil % : x  Auto Lymphocyte % : x  Auto Monocyte % : x  Auto Eosinophil % : x  Auto Basophil % : x    10-14    144  |  111<H>  |  17  ----------------------------<  133<H>  4.3   |  29  |  0.99    Ca    8.7      14 Oct 2021 03:44  Phos  2.8     10-14  Mg     2.1     10-14    TPro  6.1  /  Alb  2.0<L>  /  TBili  0.6  /  DBili  x   /  AST  77<H>  /  ALT  154<H>  /  AlkPhos  142<H>  10-14            RADIOLOGY & ADDITIONAL STUDIES REVIEWED:      [ ]GOALS OF CARE DISCUSSION WITH PATIENT/FAMILY/PROXY:    CRITICAL CARE TIME SPENT: 35 minutes

## 2021-10-14 NOTE — SWALLOW BEDSIDE ASSESSMENT ADULT - COMMENTS
Pt elevated to 90° by SLP.  AA+O x3. (+) NGT, expressed frustration c/b facial grimacing. Following delayed cough, pt began sneezing (~5x), possible s/s PO entering nasopharynx.

## 2021-10-14 NOTE — SWALLOW BEDSIDE ASSESSMENT ADULT - CONSISTENCIES ADMINISTERED
Applesauce x 3 teaspoons/puree ice chips x3 x3 tsp/nectar thick x2 sips x3 tsp / x3 sips/honey thick

## 2021-10-14 NOTE — PROGRESS NOTE ADULT - ASSESSMENT
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 s/p OR debridement today. Nephrology consulted for abrupt rise in SCr.     1. OREN- Recurrent ATN in the setting of infection. Lisinopril discontinued 9/22. ATN resolving with good urine o/p. Now off diuretics; likely recovery phase/ diuretic phase.    s/p CT chest with b/l mod to large pleural effusion R>L. s/p rt pigtail catheter. Monitor lytes  Kidney/ Bladder US with large distended bladder s/p ibrahim placement on 9/23, then removed. s/p bladder scan 10/4 with 1L urine for which ibrahim was reinserted; however; renal function did not improve post ibrahim; less likely due to obstructive uropathy.  No peripheral eosinophilia- no signs of AIN. C3 & C4 wnl with neg ASO- no signs of PIGN. Strict I/Os. Avoid nephrotoxins/ NSAIDs/ RCA. Monitor BMP.    2. 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 Will defer secondary w/u as an outpt. Avoid nephrotoxins  3. HTN 2/2 CKD- BP controlled. Continue with current anti-hypertensive medications. Monitor BP.  4. Acute osteomyelitis- s/p debridement 9/22. Pt now on Vanco and meropenem (renally dosed). Vanco trough elevated for which dose was decreased as per ICU. Plan as per ID      [facility] NEPHROLOG[facility]  Bryn Johnson M.D.  Domingo Booth D.O.  Zakia Rodriguez M.D.  Zonia Adams, REJI, ANP-C  [phone]    [address] [place] [address]

## 2021-10-14 NOTE — PROGRESS NOTE ADULT - ATTENDING SUPERVISION STATEMENT
ACP
Resident
ACP
Resident
ACP
Resident
ACP
Resident

## 2021-10-14 NOTE — PROGRESS NOTE ADULT - SUBJECTIVE AND OBJECTIVE BOX
INTERVAL HPI/OVERNIGHT EVENTS:    Pt seen and examined at bedside. No acute events, extubated overnight.   On Isolation, COVID exposure      Vital Signs Last 24 Hrs  T(C): 36.5 (14 Oct 2021 07:00), Max: 37.7 (13 Oct 2021 23:00)  T(F): 97.7 (14 Oct 2021 07:00), Max: 99.9 (13 Oct 2021 23:00)  HR: 77 (14 Oct 2021 07:00) (70 - 89)  BP: 127/49 (14 Oct 2021 07:00) (109/60 - 166/67)  BP(mean): 69 (14 Oct 2021 07:00) (54 - 98)  RR: 17 (14 Oct 2021 07:00) (9 - 32)  SpO2: 100% (14 Oct 2021 07:00) (93% - 100%)  I&O's Detail    13 Oct 2021 07:01  -  14 Oct 2021 07:00  --------------------------------------------------------  IN:    Enteral Tube Flush: 275 mL    Glucerna 1.5: 290 mL    IV PiggyBack: 250 mL    IV PiggyBack: 100 mL  Total IN: 915 mL    OUT:    Chest Tube (mL): 90 mL    Indwelling Catheter - Urethral (mL): 1335 mL    Stool (mL): 150 mL  Total OUT: 1575 mL    Total NET: -660 mL      14 Oct 2021 07:01  -  14 Oct 2021 08:09  --------------------------------------------------------  IN:    Glucerna 1.5: 40 mL  Total IN: 40 mL    OUT:    Indwelling Catheter - Urethral (mL): 130 mL  Total OUT: 130 mL    Total NET: -90 mL        meropenem  IVPB 1000 milliGRAM(s) IV Intermittent every 12 hours  pantoprazole  Injectable 40 milliGRAM(s) IV Push at bedtime  vancomycin  IVPB 1500 milliGRAM(s) IV Intermittent daily      Physical Exam  General: No acute distress  Pulm: rt pigtail in place, to water seal, -AL, output serous, dressing c/d/i        Labs:                        8.3    5.84  )-----------( 172      ( 14 Oct 2021 03:44 )             26.4     10-14    144  |  111<H>  |  17  ----------------------------<  133<H>  4.3   |  29  |  0.99    Ca    8.7      14 Oct 2021 03:44  Phos  2.8     10-14  Mg     2.1     10-14    TPro  6.1  /  Alb  2.0<L>  /  TBili  0.6  /  DBili  x   /  AST  77<H>  /  ALT  154<H>  /  AlkPhos  142<H>  10-14

## 2021-10-14 NOTE — PROGRESS NOTE ADULT - SUBJECTIVE AND OBJECTIVE BOX
`Patient is a 76y old  Male who presents with a chief complaint of R Foot Pain (14 Oct 2021 13:13)    PATIENT IS SEEN AND EXAMINED IN MEDICAL FLOOR.  KEENANT [    ]    MADDY [   ]      GT [   ]    ALLERGIES:  No Known Allergies      Daily     Daily Weight in k.7 (14 Oct 2021 07:00)    VITALS:    Vital Signs Last 24 Hrs  T(C): 37.6 (14 Oct 2021 12:00), Max: 37.7 (13 Oct 2021 23:00)  T(F): 99.7 (14 Oct 2021 12:00), Max: 99.9 (13 Oct 2021 23:00)  HR: 76 (14 Oct 2021 12:00) (70 - 85)  BP: 131/54 (14 Oct 2021 12:00) (109/60 - 166/67)  BP(mean): 75 (14 Oct 2021 12:00) (54 - 91)  RR: 18 (14 Oct 2021 12:00) (9 - 32)  SpO2: 100% (14 Oct 2021 12:00) (93% - 100%)    LABS:    CBC Full  -  ( 14 Oct 2021 03:44 )  WBC Count : 5.84 K/uL  RBC Count : 2.80 M/uL  Hemoglobin : 8.3 g/dL  Hematocrit : 26.4 %  Platelet Count - Automated : 172 K/uL  Mean Cell Volume : 94.3 fl  Mean Cell Hemoglobin : 29.6 pg  Mean Cell Hemoglobin Concentration : 31.4 gm/dL  Auto Neutrophil # : x  Auto Lymphocyte # : x  Auto Monocyte # : x  Auto Eosinophil # : x  Auto Basophil # : x  Auto Neutrophil % : x  Auto Lymphocyte % : x  Auto Monocyte % : x  Auto Eosinophil % : x  Auto Basophil % : x      10-14    144  |  111<H>  |  17  ----------------------------<  133<H>  4.3   |  29  |  0.99    Ca    8.7      14 Oct 2021 03:44  Phos  2.8     10-  Mg     2.1     10-14    TPro  6.1  /  Alb  2.0<L>  /  TBili  0.6  /  DBili  x   /  AST  77<H>  /  ALT  154<H>  /  AlkPhos  142<H>  10-14    CAPILLARY BLOOD GLUCOSE      POCT Blood Glucose.: 135 mg/dL (14 Oct 2021 11:09)  POCT Blood Glucose.: 128 mg/dL (14 Oct 2021 05:05)  POCT Blood Glucose.: 133 mg/dL (14 Oct 2021 00:33)  POCT Blood Glucose.: 182 mg/dL (13 Oct 2021 16:16)        LIVER FUNCTIONS - ( 14 Oct 2021 03:44 )  Alb: 2.0 g/dL / Pro: 6.1 g/dL / ALK PHOS: 142 U/L / ALT: 154 U/L DA / AST: 77 U/L / GGT: x           Creatinine Trend: 0.99<--, 1.31<--, 1.43<--, 1.62<--, 1.95<--, 2.45<--  I&O's Summary    13 Oct 2021 07:01  -  14 Oct 2021 07:00  --------------------------------------------------------  IN: 915 mL / OUT: 1575 mL / NET: -660 mL    14 Oct 2021 07:01  -  14 Oct 2021 13:43  --------------------------------------------------------  IN: 300 mL / OUT: 260 mL / NET: 40 mL        ABG - ( 13 Oct 2021 04:51 )  pH, Arterial: 7.46  pH, Blood: x     /  pCO2: 37    /  pO2: 199   / HCO3: 26    / Base Excess: 2.5   /  SaO2: 100                 .Body Fluid Pleural Fluid  10-08 @ 18:51   Culture is being performed.  --  --      .Body Fluid Pleural Fluid  10-08 @ 18:34   No growth at 5 days  --    polymorphonuclear leukocytes seen  No organisms seen  by cytocentrifuge      .Tissue Right 5th toe r/o osteomyeltis   @ 13:54   No growth at 5 days  --    No polymorphonuclear cells seen per low power field  No organisms seen per oil power field      .Blood Blood-Venous  09-17 @ 10:28   No Growth Final  --  --      .Surgical Swab right foot wound   @ 21:42   Culture yields >4 types of aerobic and/or anaerobic bacteria  Call client services within 7 days if further workup is clinically  indicated. Culture includes  Rare Aeromonas hydrophila/caviae  Few Klebsiella oxytoca/Raoutella ornithinolytica  Few Enterococcus faecalis  Few Streptococcus agalactiae (Group B) isolated  Group B streptococci are susceptible to ampicillin,  penicillin and cefazolin, but may be resistant to  erythromycin and clindamycin.  Recommendations for intrapartum prophylaxis for Group B  streptococci are penicillin or ampicillin.  --  Aeromonas hydrophila/caviae  Klebsiella oxytoca /Raoutella ornithinolytica  Enterococcus faecalis      Bone R 5th metatarsal bone clean ma   @ 03:59   No growth at 5 days  --    No polymorphonuclear leukocytes seen per low power field  No organisms seen per oil power field      .Blood Blood-Venous   @ 21:09   No Growth Final  --  --      .Surgical Swab Right foot plantar wound   @ 21:08   Moderate Streptococcus agalactiae (Group B) isolated  Group B streptococci are susceptible to ampicillin,  penicillin and cefazolin, but may be resistant to  erythromycin and clindamycin.  Recommendations for intrapartum prophylaxis for Group B  streptococci are penicillin or ampicillin.  Moderate Bacteroides fragilis "Susceptibilities not performed"  Normal skin filiberto isolated  --  --          MEDICATIONS:    MEDICATIONS  (STANDING):  apixaban 5 milliGRAM(s) Oral two times a day  aspirin  chewable 81 milliGRAM(s) Oral daily  atorvastatin 80 milliGRAM(s) Oral at bedtime  chlorhexidine 2% Cloths 1 Application(s) Topical <User Schedule>  collagenase Ointment 1 Application(s) Topical daily  cyanocobalamin 1000 MICROGram(s) Oral daily  ergocalciferol 76829 Unit(s) Oral <User Schedule>  ferrous    sulfate Liquid 300 milliGRAM(s) Enteral Tube daily  finasteride 5 milliGRAM(s) Oral daily  gabapentin 100 milliGRAM(s) Oral three times a day  insulin lispro (ADMELOG) corrective regimen sliding scale   SubCutaneous every 6 hours  meropenem  IVPB 1000 milliGRAM(s) IV Intermittent every 12 hours  metoprolol tartrate 25 milliGRAM(s) Oral two times a day  NIFEdipine XL 60 milliGRAM(s) Oral daily  pantoprazole  Injectable 40 milliGRAM(s) IV Push at bedtime      MEDICATIONS  (PRN):  ondansetron Injectable 4 milliGRAM(s) IV Push three times a day PRN Nausea and/or Vomiting  sodium chloride 0.9% lock flush 10 milliLiter(s) IV Push every 1 hour PRN Pre/post blood products, medications, blood draw, and to maintain line patency      REVIEW OF SYSTEMS:                           ALL ROS DONE [ X   ]    CONSTITUTIONAL:  LETHARGIC [   ], FEVER [   ], UNRESPONSIVE [   ]  CVS:  CP  [   ], SOB, [   ], PALPITATIONS [   ], DIZZYNESS [   ]  RS: COUGH [   ], SPUTUM [   ]  GI: ABDOMINAL PAIN [   ], NAUSEA [   ], VOMITINGS [   ], DIARRHEA [   ], CONSTIPATION [   ]  :  DYSURIA [   ], NOCTURIA [   ], INCREASED FREQUENCY [   ], DRIBLING [   ],  SKELETAL: PAINFUL JOINTS [   ], SWOLLEN JOINTS [   ], NECK ACHE [   ], LOW BACK ACHE [   ],  SKIN : ULCERS [   ], RASH [   ], ITCHING [   ]  CNS: HEAD ACHE [   ], DOUBLE VISION [   ], BLURRED VISION [   ], AMS / CONFUSION [   ], SEIZURES [   ], WEAKNESS [   ],TINGLING / NUMBNESS [   ]        PHYSICAL EXAMINATION:  GENERAL APPEARANCE: NO DISTRESS  HEENT:  NO PALLOR, NO  JVD,  NO   NODES, NECK SUPPLE  CVS: S1 +, S2 +,   RS: AEEB,  OCCASIONAL  RALES +,  RONCHI +, CRACKLES +     ABD: SOFT, NT, NO, BS +       EXT: NO PE  SKIN: WARM,   RIGHT FOOT WRAPPED W/ WOUND VAC IN PLACE   ,   CVC+  ,   CT + [RIGH] ATTACHED TO PLEUROVAC  SKELETAL:  ROM ACCEPTABLE  CNS:  AAO X  0    RADIOLOGY :    EXAM:  CT ABDOMEN AND PELVIS OC                            PROCEDURE DATE:  2021          INTERPRETATION:  CLINICAL INFORMATION: Small bowel obstruction.    COMPARISON: None.    CONTRAST/COMPLICATIONS:  IV Contrast: NONE  Oral Contrast: Omnipaque 300  Complications: None reported at time of study completion    PROCEDURE:  CT of the Abdomen and Pelvis was performed.  Sagittal and coronal reformats were performed.    FINDINGS:  LOWER CHEST: Small bilateral pleural effusions with compressiveatelectasis of both lower lobes.    LIVER: Within normal limits.  BILE DUCTS: Normal caliber.  GALLBLADDER: Distended. Small layering gallstones.  SPLEEN: Within normal limits.  PANCREAS: Within normal limits.  ADRENALS: Within normal limits.  KIDNEYS/URETERS: No hydronephrosis. Nonobstructing left upper pole calculus, measuring 0.6 cm.    BLADDER: Urinary bladder contains air and a Castañeda catheter balloon.  REPRODUCTIVE ORGANS: Prostate is not enlarged.    BOWEL: No bowel obstruction. Appendix isnormal. Colonic diverticulosis.  PERITONEUM: Mild presacral fluid.  VESSELS: Atherosclerotic changes.  RETROPERITONEUM/LYMPH NODES: No lymphadenopathy.  ABDOMINAL WALL: Within normal limits.  BONES: Degenerative changes. Sternotomy.    IMPRESSION:  No acute intra-abdominal pathology.    Small bilateral pleural effusions with compressive atelectasis of both lower lobes.    ====================================================    EXAM:  MR FOOT RT                            PROCEDURE DATE:  2021          INTERPRETATION:  Clinical Information: Recent fifth metatarsal head resection now with fifth digit pain, swelling and foul discharge.    Comparison: Radiographs of the right foot from 2021 and MRI the right foot from 2021.    Technique:  MRI of the right midfoot and forefoot.  Intravenous Contrast: None.    Findings:    There is a resection at the mid aspect of the fifth metatarsal shaft. There is a lateral soft tissue wound beginning at the level of the amputation which extends distally. There is susceptibility artifact in the region of the amputation consistent with postoperative change. There is hyperintense T2 marrow signal throughout the remaining fifth metatarsal and within the adjacent fourth metatarsal head which is nonspecific and could be related to recent postoperative changes although osteomyelitis is suspected.    There is edema and mild atrophy within the plantar muscles of the foot. There is minimal spurring at the first metatarsophalangeal and hallux sesamoid articulations.    Impression:  Resection at the mid aspect of the fifth metatarsal. Lateral soft tissue wound with marrow signal abnormality throughout the fifth metatarsal and within the adjacent fourth metatarsal head which is nonspecific in the setting of recent surgery although osteomyelitis is suspected.        ASSESSMENT :     Headache    HTN (hypertension)    HLD (hyperlipidemia)    DM (diabetes mellitus)    CAD (coronary artery disease)    Glaucoma, angle-closure    [place] (hard of hearing)    No significant past surgical history    S/P CABG (coronary artery bypass graft)    History of thyroid surgery        PLAN:  HPI:  76M from home, lives with daughter w/ PMH DM on insulin, HTN, HLD, CAD, PSH R partial 5th ray amputation 2021 p/w R foot pain x1 day associated with foul smelling discharge. Pt with sharp pain on the R lateral foot. As per daughter, pt was taking tylenol which did not help his pain prompting the patient to ask his daughter to take him to the hospital. Pt is a poor historian. Daughter states that the patient did not see his podiatrist after the surgery. No fever, chest, pain, palpitations, nausea, vomiting, diarrhea (17 Sep 2021 01:11)    # TRANSFERRED TO ICU FOR WORSENING HYPOXIA AND DYSPNEA    # COVID EXPOSURE ON 10/13 - ON CONTACT AND DROPLET ISOLATION PRECAUTION; F/U COVID PCR    # SUSPECT RIGHT FOOT OSTEOMYELITIS S/P RIGHT 5TH RAY RESECTION [] AND S/P DEBRIDEMENT, GRAFT APPLICATION, BONE BX AND WOUND VAC APPLICATION   ** RECENT ADMISSION FOR RIGHT DIABETIC FOOT ULCER, CELLULITIS, OSTEOMYELITIS RIGHT 5th METATARSAL S/P RIGHT 5TH PARTIAL RAY AMPUTATION [] - BONE PATHOLOGY W/ CLEAN MARGINS    - S/P VANCOMYCIN AND ZOSYN ; NOW ON UNASYN AND LEVAQUIN GIVEN OREN; PER ID SWITCH TO ZOSYN UNTIL 11/3  - RECENTLY HAD SIMON W/ MILD PAD  - REVIEWED WOUND CX [ ENTEROCOCCUS, AEROMONAS, KLEBSIELLA] AND BCX  - BONE BX - acute osteomyelitis and necrotic periosteal tissue.   - ID CONSULT, PODIATRY CONSULT    - PICC LINE PLACED TO COMPLETE 6 WEEKS OF ANTIBIOTICS - PER ID SWITCH TO ZOSYN UNTIL 11/3    # ACUTE HYPOXIC RESPIRATORY FAILURE SUSPECT S/T PULMONARY EDEMA IN THE SETTING OF SEVERE AORTIC STENOSIS + ASPIRATION PNA; COMBINED SYSTOLIC + DIASTOLIC HF - S/P CT TUBE PLACEMENT [SET TO PLEUROVAC] - SWITCHED FROM LEVAQUIN TO VANCOMYCIN + MEROPENEM, LASIX, CARDIOLOGY CONSULT IN PROGRESS  - CRITICAL CARE CONSULT  - ASPIRATION EVENT WHEN PATIENT REMOVED NGT   - S/P LASIX ON 10/1 - 10/2, 10/4 AND 10/5  - CT CHEST W/ BILATERAL PLEURAL EFFUSIONS [10/5] - PULMONOLOGY CONSULT FOR POSSIBLE THORACENTESIS & WILL CONTINUE LASIX   - ALSO F/U REPEAT ECHOCARDIOGRAM  - CT SURGERY WAS CONSULTED BUT UNABLE TO PLACE PIGTAIL, THUS IR CONSULT IN PROGRESS FOR PIGTAIL PLACEMENT  - CHEST TUBE PLACED [10/8] - FLUID CONSISTENT WITH TRANSUDATIVE PROCESS  - NOTED REPEAT ECHO  - S/P EXTUBATION 10/13    # SHOCK - ? S/T HYPOVOLEMIA VS. SEPSIS - W/ CVC [SWITCHED FROM FEMORAL TO LEFT IJ], S/P VASOPRESSORS - SWITCHED TO VANCOMYCIN AND MEROPENEM  - F/U BCX  - ID CONSULT IN PROGRESS    # TRANSIENT NEW ONSET A.FIB, PAROXYSMAL - MONITORING ON CARDIAC MONITOR, ELIQUIS, CARDIOLOGY CONSULT IN PROGRESS    # FUNGURIA - D/C FLUCONAZOLE GIVEN TRANSAMINITIS    # TRANSAMINITIS - SUSPECT THIS IS ISCHEMIC HEPATITIS - IMPROVING - HEPATOLOGY CONSULT IN PROGRESS    # BOWEL DISTENTION - ? ILEUS - OPTIMIZING ELECTROLYTES - IMPROVED  - SURGERY CONSULT IN PROGRESS  - PASSED 2 BMS ON     # CHOLELITHIASIS - TRENDING LFTS, RUQ U/S - CHOLELITHIASIS, SURGERY CONSULT IN PROGRESS  - GI CONSULT IN PROGRESS - LESS SUSPICIOUS FOR CHOLECYSTITIS    # DYSPEPSIA - PLACED ON PPI BID WITH IMPROVEMENT, UNDERWENT ST EVAL     # ELEVATED TROPONINS - NSTEMI - ? DEMAND - WILL NEED ISCHEMIC EVAL ONCE ACUTE INFECTION RESOLVES PER CARDIOLOGY, CARDIOLOGY CONSULT IN PROGRESS  - ON ASA, STATIN, BB    # OREN ON CKD - S/T ATN AND URINARY RETENTION - S/P IVF, NOW W/ CASTAÑEDA, NEPHROLOGY CONUSLT IN PROGRESS  - ON FLOMAX, ADDED FINASTERIDE    - WITH WORSENING RENAL FXN - NOTED URINARY RETENTION [10/4] - REPLACED CASTAÑEDA    # MEDICAL CLEARANCE FOR SURGERY - RCRI - 2 - 10.1 % 30 DAY RISK OF DEATH, MI OR CARDIAC ARREST  - CARDIOLOGY CONSULT     # DM -  HBA1C - 11  - LANTUS, SSI + FS    # CAD S/P CABG - PLACED ON ASA, STATIN, BB  - ECHO - SEVERE AORTIC STENOSIS, SEVERE CONCENTRIC LVH, G1DD, MILD LA  - CARDIOLOGY CONSULT - DR. BASSETT   - MAY NEED ISCHEMIC EVAL ONCE ACUTE ILLNESS RESOLVES INCLUDING CARDIAC CATHETERIZATION    # HTN - ON METOPROLOL, NICARDIPINE    # SEVERE, ASYMPTOMATIC, STENOSIS - ONCE ACUTE ILLNESS RESOLVES, WILL LIKELY NEED ISCHEMIC WORKUP, SABIHA TO EVALUATE FURTHER. D/W PATIENT, DAUGHTER AND CARDIOLOGY TEAM [PREVIOUSLY SAW DR. BASSETT]    # VITAMIN D DEFICIENCY - ON CHOLECALCIFEROL    # HLD - STATIN    # CASE DISCUSSED AT LENGTH WITH PATIENT AND DAUGHTER, BIRDIE ROBERT AT BEDSIDE AND VIA PHONE @ [phone] [10/5] - CASE DICUSSED AT LENGTH AND ALL QUESTIONS WERE ANSWERED. DAUGHTER UNDERSTOOD THAT PROGNOSIS IS GUARDED.  # PATIENT AND DAUGHTER REFUSED [facility] ON PREVIOUS ADMISSION, PREVIOUSLY WISHED FOR PATIENT RETURN HOME W/ HOME PT; HOWEVER NOW, DAUGHTER WISHES FOR PATIENT TO GO TO [facility] [place] [facility], AS PATIENT'S WIFE IS CURRENTLY ADMITTED THERE    # GI AND DVT PPX

## 2021-10-14 NOTE — PROGRESS NOTE ADULT - ATTENDING COMMENTS
IMP: 76 yr old man from home, lives with daughter with DM- uncontrol  on insulin, HTN, HLD, CAD, PSH R partial 5th ray amputation 8/19/2021 p/w R foot pain x1 day associated with foul smelling discharge due to osteomyelitis being treated with iv antibx .  ICU admission for hypoxia requiring mechanical ventilation      Assessment:  - Acute Hypoxic Respiratory Failure  - Pulmonary Edema  - Pleural Effusion   - Osteomyelitis of right foot   - OREN superimposed on CKD stage 3  - Severe Aortic stenosis  - CAD s/p CABG   - Cholelithiasis   - HTN  - HLD  - DM uncontrolled  - Diabetic foot ulcer  - New onset Atrial fibrillation       Plan   -Extubated 10/13, not in distress  -Chest tube to water seal per surgery, minimal drainage, f/u thoracic surgery regarding removal  -Pleural effusion appears to be transudative   -Cont. anticoagulation  -home dose beta blockers for rate control  -cards following   -Cont ASA and statin   -hemodynamic monitoring   -continue antibx with vancomycin/meropenem, adjust dose per GFR as improving renal function   -monitor blood sugar with coverage for hyperglycemia  -advance directives  -PT evaluation  -Dysphagia evaluation prior to restarting feedings  -DVT/ GI prophylaxis  -Transfer to medical service

## 2021-10-14 NOTE — SWALLOW BEDSIDE ASSESSMENT ADULT - ASR SWALLOW DENTITION
partial dentures/rarely or never uses dentures to eat
single denture on lower gum/edentulous, does not have dentures

## 2021-10-14 NOTE — PROGRESS NOTE ADULT - SUBJECTIVE AND OBJECTIVE BOX
Chief Complaint:  Patient is a 76y old  Male who presents with a chief complaint of R Foot Pain (14 Oct 2021 13:40)      Reason for consult:    Interval Events:     Hospital Medications:  apixaban 5 milliGRAM(s) Oral two times a day  aspirin  chewable 81 milliGRAM(s) Oral daily  atorvastatin 80 milliGRAM(s) Oral at bedtime  chlorhexidine 2% Cloths 1 Application(s) Topical <User Schedule>  collagenase Ointment 1 Application(s) Topical daily  cyanocobalamin 1000 MICROGram(s) Oral daily  ergocalciferol 97531 Unit(s) Oral <User Schedule>  ferrous    sulfate Liquid 300 milliGRAM(s) Enteral Tube daily  finasteride 5 milliGRAM(s) Oral daily  gabapentin 100 milliGRAM(s) Oral three times a day  insulin lispro (ADMELOG) corrective regimen sliding scale   SubCutaneous every 6 hours  meropenem  IVPB 1000 milliGRAM(s) IV Intermittent every 12 hours  metoprolol tartrate 25 milliGRAM(s) Oral two times a day  NIFEdipine XL 60 milliGRAM(s) Oral daily  ondansetron Injectable 4 milliGRAM(s) IV Push three times a day PRN  pantoprazole  Injectable 40 milliGRAM(s) IV Push at bedtime  sodium chloride 0.9% lock flush 10 milliLiter(s) IV Push every 1 hour PRN      ROS:   General:  No  fevers, chills, night sweats, fatigue  Eyes:  Good vision, no reported pain  ENT:  No sore throat, pain, runny nose  CV:  No pain, palpitations  Pulm:  No dyspnea, cough  GI:  See HPI, otherwise negative  :  No  incontinence, nocturia  Muscle:  No pain, weakness  Neuro:  No memory problems  Psych:  No insomnia, mood problems, depression  Endocrine:  No polyuria, polydipsia, cold/heat intolerance  Heme:  No petechiae, ecchymosis, easy bruisability  Skin:  No rash    PHYSICAL EXAM:   Vital Signs:  Vital Signs Last 24 Hrs  T(C): 37.6 (14 Oct 2021 12:00), Max: 37.7 (13 Oct 2021 23:00)  T(F): 99.7 (14 Oct 2021 12:00), Max: 99.9 (13 Oct 2021 23:00)  HR: 76 (14 Oct 2021 12:00) (70 - 85)  BP: 131/54 (14 Oct 2021 12:00) (109/60 - 166/67)  BP(mean): 75 (14 Oct 2021 12:00) (54 - 91)  RR: 18 (14 Oct 2021 12:00) (9 - 32)  SpO2: 100% (14 Oct 2021 12:00) (93% - 100%)  Daily     Daily Weight in k.7 (14 Oct 2021 07:00)    GENERAL: no acute distress  NEURO: alert, no asterixis  HEENT: anicteric sclera, no conjunctival pallor appreciated  CHEST: no respiratory distress, no accessory muscle use  CARDIAC: regular rate, rhythm  ABDOMEN: soft, non-tender, non-distended, no rebound or guarding  EXTREMITIES: warm, well perfused, no edema  SKIN: no lesions noted    LABS: reviewed                        8.3    5.84  )-----------( 172      ( 14 Oct 2021 03:44 )             26.4     10-14    144  |  111<H>  |  17  ----------------------------<  133<H>  4.3   |  29  |  0.99    Ca    8.7      14 Oct 2021 03:44  Phos  2.8     10-14  Mg     2.1     10-14    TPro  6.1  /  Alb  2.0<L>  /  TBili  0.6  /  DBili  x   /  AST  77<H>  /  ALT  154<H>  /  AlkPhos  142<H>  10-14    LIVER FUNCTIONS - ( 14 Oct 2021 03:44 )  Alb: 2.0 g/dL / Pro: 6.1 g/dL / ALK PHOS: 142 U/L / ALT: 154 U/L DA / AST: 77 U/L / GGT: x             Interval Diagnostic Studies: see sunrise for full report   Chief Complaint:  Patient is a 76y old  Male who presents with a chief complaint of R Foot Pain (14 Oct 2021 13:40)      Reason for consult: Abnormal liver enzymes     Interval Events: Patient was seen and examined at bedside in ICU. He is awake, alert, oriented to self and partially in time. Liver tests continued to improve.     Hospital Medications:  apixaban 5 milliGRAM(s) Oral two times a day  aspirin  chewable 81 milliGRAM(s) Oral daily  atorvastatin 80 milliGRAM(s) Oral at bedtime  chlorhexidine 2% Cloths 1 Application(s) Topical <User Schedule>  collagenase Ointment 1 Application(s) Topical daily  cyanocobalamin 1000 MICROGram(s) Oral daily  ergocalciferol 16766 Unit(s) Oral <User Schedule>  ferrous    sulfate Liquid 300 milliGRAM(s) Enteral Tube daily  finasteride 5 milliGRAM(s) Oral daily  gabapentin 100 milliGRAM(s) Oral three times a day  insulin lispro (ADMELOG) corrective regimen sliding scale   SubCutaneous every 6 hours  meropenem  IVPB 1000 milliGRAM(s) IV Intermittent every 12 hours  metoprolol tartrate 25 milliGRAM(s) Oral two times a day  NIFEdipine XL 60 milliGRAM(s) Oral daily  ondansetron Injectable 4 milliGRAM(s) IV Push three times a day PRN  pantoprazole  Injectable 40 milliGRAM(s) IV Push at bedtime  sodium chloride 0.9% lock flush 10 milliLiter(s) IV Push every 1 hour PRN      ROS:   General:  No  fevers, chills  CV:  No pain, palpitations  Pulm:  No dyspnea, cough  GI:  No abdominal pain, N/V, diarrhea or blood in stool  :  No  dysuria  Muscle:  No pain, weakness  Neuro:  Oriented to self  Skin:  Ecchymoses    PHYSICAL EXAM:   Vital Signs:  Vital Signs Last 24 Hrs  T(C): 37.6 (14 Oct 2021 12:00), Max: 37.7 (13 Oct 2021 23:00)  T(F): 99.7 (14 Oct 2021 12:00), Max: 99.9 (13 Oct 2021 23:00)  HR: 76 (14 Oct 2021 12:00) (70 - 85)  BP: 131/54 (14 Oct 2021 12:00) (109/60 - 166/67)  BP(mean): 75 (14 Oct 2021 12:00) (54 - 91)  RR: 18 (14 Oct 2021 12:00) (9 - 32)  SpO2: 100% (14 Oct 2021 12:00) (93% - 100%)  Daily     Daily Weight in k.7 (14 Oct 2021 07:00)    GENERAL: no acute distress  NEURO: awake, alert, oriented to self, following commands  HEENT: anicteric sclera, no conjunctival pallor appreciated  CHEST: no respiratory distress, no accessory muscle use, chest tube  CARDIAC: regular rate, rhythm  ABDOMEN: soft, non-tender, non-distended, no rebound or guarding, BS+  EXTREMITIES: no edema  SKIN: Easy bruisability    LABS: reviewed                        8.3    5.84  )-----------( 172      ( 14 Oct 2021 03:44 )             26.4     10-14    144  |  111<H>  |  17  ----------------------------<  133<H>  4.3   |  29  |  0.99    Ca    8.7      14 Oct 2021 03:44  Phos  2.8     10-14  Mg     2.1     10-14    TPro  6.1  /  Alb  2.0<L>  /  TBili  0.6  /  DBili  x   /  AST  77<H>  /  ALT  154<H>  /  AlkPhos  142<H>  10-14    LIVER FUNCTIONS - ( 14 Oct 2021 03:44 )  Alb: 2.0 g/dL / Pro: 6.1 g/dL / ALK PHOS: 142 U/L / ALT: 154 U/L DA / AST: 77 U/L / GGT: x             Interval Diagnostic Studies: see sunrise for full report

## 2021-10-14 NOTE — CHART NOTE - NSCHARTNOTEFT_GEN_A_CORE
Spoke to Arabella Oliva. Updated daughter on patient's clinical status. Made aware that the patient is stable for downgrade.

## 2021-10-14 NOTE — SWALLOW BEDSIDE ASSESSMENT ADULT - SWALLOW EVAL: DIAGNOSIS
Pt p/w s&s of oropharyngeal dysphagia c/b decreased oral aperture, reduced oral grading, BOT pumping, & decreased hyolaryngeal elevation and excursion.  Overt s&s of penetration/ aspiration (delayed cough) was noted on trial of Nectar thick liquid via cup sip. Following delayed cough, pt began sneezing (~5x), possible s/s PO entering nasopharynx.
Pt p/w s&s of oropharyngeal dysphagia c/b poor oral aperature, decreased matication, reduced oral grading, prolonged bolus holding, decreased elevation/excursion. s/s of aspiration/ penetration (cough) followed trials of puree + mech soft PO trials; however, suspected 2/2 to reflux.

## 2021-10-14 NOTE — PROGRESS NOTE ADULT - SUBJECTIVE AND OBJECTIVE BOX
Patient is seen and examined at the bed side, is afebrile. He is extubated and doing better. The kidney function improved to normal.      REVIEW OF SYSTEMS: Unable to obtain due to mental status      ALLERGIES: No Known Allergies      ICU Vital Signs Last 24 Hrs  T(C): 37.6 (14 Oct 2021 12:00), Max: 37.7 (13 Oct 2021 23:00)  T(F): 99.7 (14 Oct 2021 12:00), Max: 99.9 (13 Oct 2021 23:00)  HR: 76 (14 Oct 2021 12:00) (70 - 85)  BP: 131/54 (14 Oct 2021 12:00) (109/60 - 166/67)  BP(mean): 75 (14 Oct 2021 12:00) (54 - 91)  ABP: --  ABP(mean): --  RR: 18 (14 Oct 2021 12:00) (9 - 32)  SpO2: 100% (14 Oct 2021 12:00) (93% - 100%)      PHYSICAL EXAM:  GENERAL:  s/p extubated  CHEST/LUNG:  Not using accessory muscles, Right CT in placed   HEART: s1 and s2 present  ABDOMEN:  Mild distended   EXTREMITIES: Right foot bandage in placed   CNS: s/p extubated      LABS:                        8.3    5.84  )-----------( 172      ( 14 Oct 2021 03:44 )             26.4                             8.6    6.34  )-----------( 182      ( 13 Oct 2021 04:58 )             27.3       10-14    144  |  111<H>  |  17  ----------------------------<  133<H>  4.3   |  29  |  0.99    Ca    8.7      14 Oct 2021 03:44  Phos  2.8     10-14  Mg     2.1     10-14    TPro  6.1  /  Alb  2.0<L>  /  TBili  0.6  /  DBili  x   /  AST  77<H>  /  ALT  154<H>  /  AlkPhos  142<H>  10-14    10-13    145  |  111<H>  |  19<H>  ----------------------------<  199<H>  4.2   |  28  |  1.31<H>    Ca    8.6      13 Oct 2021 04:58  Phos  2.2     10-13  Mg     2.1     10-13    TPro  6.5  /  Alb  2.1<L>  /  TBili  0.5  /  DBili  x   /  AST  153<H>  /  ALT  227<H>  /  AlkPhos  180<H>  10-13      09-25    139  |  107  |  41<H>  ----------------------------<  134<H>  4.1   |  21<L>  |  4.05<H>    Ca    8.6      25 Sep 2021 06:40    TPro  6.6  /  Alb  2.3<L>  /  TBili  0.5  /  DBili  x   /  AST  25  /  ALT  15  /  AlkPhos  102  09-25        CAPILLARY BLOOD GLUCOSE  POCT Blood Glucose.: 134 mg/dL (17 Sep 2021 17:11)  POCT Blood Glucose.: 128 mg/dL (17 Sep 2021 11:06)  POCT Blood Glucose.: 157 mg/dL (17 Sep 2021 04:10)      Vancomycin Level, Trough (10.14.21 @ 11:32)   Vancomycin Level, Trough: 21.8  Vancomycin Level, Trough (10.12.21 @ 12:13)   Vancomycin Level, Trough: 19.5:      MEDICATIONS  (STANDING):  apixaban 5 milliGRAM(s) Oral two times a day  aspirin  chewable 81 milliGRAM(s) Oral daily  atorvastatin 80 milliGRAM(s) Oral at bedtime  chlorhexidine 2% Cloths 1 Application(s) Topical <User Schedule>  collagenase Ointment 1 Application(s) Topical daily  cyanocobalamin 1000 MICROGram(s) Oral daily  ergocalciferol 17758 Unit(s) Oral <User Schedule>  ferrous    sulfate Liquid 300 milliGRAM(s) Enteral Tube daily  finasteride 5 milliGRAM(s) Oral daily  gabapentin 100 milliGRAM(s) Oral three times a day  insulin lispro (ADMELOG) corrective regimen sliding scale   SubCutaneous every 6 hours  meropenem  IVPB 1000 milliGRAM(s) IV Intermittent every 12 hours  metoprolol tartrate 25 milliGRAM(s) Oral two times a day  NIFEdipine XL 60 milliGRAM(s) Oral daily  pantoprazole  Injectable 40 milliGRAM(s) IV Push at bedtime        RADIOLOGY & ADDITIONAL TESTS:    10/5/21 CT Chest No Cont (10.05.21 @ 14:07) Pulmonary edema with bilateral moderate to large pleural effusions. Underlying bilateral lower lobe compressive atelectasis versus pneumonia.  Mildly enlarged mediastinal lymph nodes, possibly reactive.      10/2/21: Xray Chest 1 View- PORTABLE-Routine (Xray Chest 1 View- PORTABLE-Routine in AM.) (10.02.21 @ 09:46) There is persistent bilateral perihilar/basilar diffuse airspace disease and/or RIGHT effusion.  Cardiomegaly.  No interval change.    Collected Date/Time: 9/22/2021 08:42 EDT   Received Date/Time: 9/23/2021 09:29 EDT   Surgical Pathology Report - Auth (Verified)   Specimen(s) Submitted   1 Right 5th Toe Wound Debridement r/o Osteomyelitis   Final Diagnosis   Right fifth toe; wound debridement:   - Bone with acute osteomyelitis and necrotic periosteal tissue.     9/28/21: US Abdomen Upper Quadrant Right (09.28.21 @ 12:13) Cholelithiasis without evidence of acute cholecystitis. Note is made of right pleural effusion    9/27/21: CT Abdomen and Pelvis w/ Oral Cont (09.27.21 @ 15:53) >No acute intra-abdominal pathology.    Small bilateral pleural effusions with compressive atelectasis of both lower lobes.    9/26/21: Xray Chest 1 View-PORTABLE IMMEDIATE (Xray Chest 1 View-PORTABLE IMMEDIATE .) (09.26.21 @ 15:28) : Small bilateral pleural effusions and mild pulmonary edema. A right lower lobe pneumonia is not excluded.      9/26/21: Xray Abdomen 1 View Portable, IMMEDIATE (Xray Abdomen 1 View Portable, IMMEDIATE .) (09.26.21 @ 15:28) : There is a nasogastric tube with its tip in the stomach. There is gaseous distention of the colon. No pathologic calcifications are seen. The osseous structures are intact with degenerative change is present in the spine.      9/17/21 : MR Foot No Cont, Right (09.17.21 @ 13:04) : Resection at the mid aspect of the fifth metatarsal. Lateral soft tissue wound with marrow signal abnormality throughout the fifth metatarsal and within the adjacent fourth metatarsal head which is nonspecific in the setting of recent surgery although osteomyelitis is suspected.    9/16/21 : Xray Foot AP + Lateral + Oblique, Right (09.16.21 @ 15:03) : No acute finding. No plain film evidence of osteomyelitis.        MICROBIOLOGY DATA:    COVID-19 PCR (10.11.21 @ 05:44)   COVID-19 PCR: NotDetec:   Urine Microscopic-Add On (NC) (09.22.21 @ 16:14)   Red Blood Cell - Urine: 0-2 /HPF   White Blood Cell - Urine: 3-5 /HPF   Bacteria: Moderate /HPF   Comment - Urine: yeast cells present   Epithelial Cells: Moderate /HPF     Culture - Tissue with Gram Stain (09.22.21 @ 13:54)   Gram Stain: No polymorphonuclear cells seen per low power field   No organisms seen per oil power field   Specimen Source: .Tissue Right 5th toe r/o osteomyeltis   Culture Results: No growth     COVID-19 David Domain Antibody (09.18.21 @ 12:55)   COVID-19 David Domain Antibody Result: >250.00:    Culture - Blood (09.17.21 @ 10:28)   Specimen Source: .Blood Blood-Peripheral   Culture Results: No growth to date.     Culture - Blood (09.17.21 @ 10:28)   Specimen Source: .Blood Blood-Venous   Culture Results: No growth to date.     Culture - Surgical Swab (09.16.21 @ 21:42)   - Amikacin: S <=16   - Amikacin: S <=16   - Amoxicillin/Clavulanic Acid: S <=8/4   - Ampicillin: R >16 These ampicillin results predict results for amoxicillin   - Ampicillin: S <=2 Predicts results to ampicillin/sulbactam, amoxacillin-clavulanate and piperacillin-tazobactam.   - Ampicillin/Sulbactam: S 8/4 Enterobacter, Citrobacter, and Serratia may develop resistance during prolonged therapy (3-4 days)   - Ampicillin/Sulbactam: R >16/8   - Aztreonam: S <=4   - Aztreonam: S <=4   - Cefazolin: R 16 Enterobacter, Citrobacter, and Serratia may develop resistance during prolonged therapy (3-4 days)   - Cefazolin: R >16   - Cefepime: S <=2   - Cefepime: S <=2   - Cefoxitin: S <=8   - Cefoxitin: S <=8   - Ceftazidime: S <=1   - Ceftriaxone: S <=1   - Ceftriaxone: S <=1 Enterobacter, Citrobacter, and Serratia may develop resistance during prolonged therapy   - Ciprofloxacin: S <=0.25   - Ciprofloxacin: S <=0.25   - Ertapenem: S <=0.5   - Gentamicin: S <=2   - Gentamicin: S <=2   - Imipenem: S <=1   - Levofloxacin: S <=0.5   - Levofloxacin: S <=0.5   - Meropenem: S <=1   - Meropenem: S <=1   - Piperacillin/Tazobactam: S <=8   - Piperacillin/Tazobactam: S <=8   - Tetra/Doxy: S 4   - Tobramycin: S <=2   - Trimethoprim/Sulfamethoxazole: S <=0.5/9.5   - Trimethoprim/Sulfamethoxazole: S <=0.5/9.5   - Vancomycin: S 2   Specimen Source: .Surgical Swab right foot wound   Culture Results:   Culture yields >4 types of aerobic and/or anaerobic bacteria   Call client services within 7 days if further workup is clinically   indicated. Culture includes   Rare Aeromonas hydrophila/caviae   Few Klebsiella oxytoca/Raoutella ornithinolytica   Few Enterococcus faecalis   Few Streptococcus agalactiae (Group B) isolated   Group B streptococci are susceptible to ampicillin,   penicillin and cefazolin, but may be resistant to   erythromycin and clindamycin.   Recommendations for intrapartum prophylaxis for Group B   streptococci are penicillin or ampicillin.   Organism Identification: Aeromonas hydrophila/caviae   Klebsiella oxytoca /Raoutella ornithinolytica   Enterococcus faecalis   Organism: Aeromonas hydrophila/caviae   Organism: Klebsiella oxytoca /Raoutella ornithinolytica   Organism: Enterococcus faecalis   COVID-19 PCR . (09.16.21 @ 22:24)   COVID-19 PCR: NotDetec:

## 2021-10-14 NOTE — PROGRESS NOTE ADULT - ASSESSMENT
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 He was found to have acute osteomyelitis,  s/p OR debridement, bone biopsy, wound vac on 9/22/21. Also noted OREN, and acute hypoxic respiratory failure due to pulmonary edema in setting of severe AS + aspirational PNA, as well as pleural effusions, required intubation on 10/7, became hemodynamically unstable, requiring pressor support since 10/7.   Hepatology was consulted on 10/8 for acute, severe, hepatocellular liver injury, with intact function and cholestatic parameters, suspected likely due to ischemic injury, vs. DILI. The sudden acute rise and significant improvement (AST became half in a day), rather support the ischemic injury.   Transaminases continued to improve: AST 77<-153<-137 <--1057,  <-227<- 252<--559. ALP has been elevated since 10/10, 142, bilirubin remains normal.     - C/w close monitoring of LFTs, including INR  - Avoid hepatotoxic medications when medically feasible (fluconazole and levofloxacin was d/c), if ALT elevation persists > 5-10x ULN, need to reevaluate statin  - C/w supportive measures per ICU  - C/w antibiotics per ID  - Can send Hep viral panel    - Cholelithiasis and distended GB on prior US 9/28, was no evidence of acute cholecystitis, was seen by GI. Later developed mostly hepatocellular elevation with normal cholestatic parameters. Now ALP slightly up-trending since 10/10, although bilirubin remains normal. Can send bone specific ALP.  - Consider repeat RUQ US if cholestatic parameters worsen.    - On AXR 10/6 concern joey for pneumoperitoneum -patient was too unstable for follow up imaging. Consider follow up imaging when feasible.    Thank you for the consult  Will continue to follow.   Discussed with ICU team.

## 2021-10-14 NOTE — PROGRESS NOTE ADULT - SUBJECTIVE AND OBJECTIVE BOX
C A R D I O L O G Y  **********************************    DATE OF SERVICE: 10-14-21    Extubated yesterday, appears comfortable    Review of symptoms otherwise negative.    apixaban 5 milliGRAM(s) Oral two times a day  aspirin  chewable 81 milliGRAM(s) Oral daily  atorvastatin 80 milliGRAM(s) Oral at bedtime  chlorhexidine 2% Cloths 1 Application(s) Topical <User Schedule>  collagenase Ointment 1 Application(s) Topical daily  cyanocobalamin 1000 MICROGram(s) Oral daily  ergocalciferol 60019 Unit(s) Oral <User Schedule>  ferrous    sulfate Liquid 300 milliGRAM(s) Enteral Tube daily  finasteride 5 milliGRAM(s) Oral daily  gabapentin 100 milliGRAM(s) Oral three times a day  insulin lispro (ADMELOG) corrective regimen sliding scale   SubCutaneous every 6 hours  meropenem  IVPB 1000 milliGRAM(s) IV Intermittent every 12 hours  metoprolol tartrate 25 milliGRAM(s) Oral two times a day  NIFEdipine XL 60 milliGRAM(s) Oral daily  ondansetron Injectable 4 milliGRAM(s) IV Push three times a day PRN  pantoprazole  Injectable 40 milliGRAM(s) IV Push at bedtime  sodium chloride 0.9% lock flush 10 milliLiter(s) IV Push every 1 hour PRN  vancomycin  IVPB 1250 milliGRAM(s) IV Intermittent every 12 hours                            8.3    5.84  )-----------( 172      ( 14 Oct 2021 03:44 )             26.4       Hemoglobin: 8.3 g/dL (10-14 @ 03:44)  Hemoglobin: 8.6 g/dL (10-13 @ 04:58)  Hemoglobin: 8.1 g/dL (10-12 @ 12:13)  Hemoglobin: 7.8 g/dL (10-12 @ 04:34)  Hemoglobin: 8.5 g/dL (10-11 @ 05:44)      10-14    144  |  111<H>  |  17  ----------------------------<  133<H>  4.3   |  29  |  0.99    Ca    8.7      14 Oct 2021 03:44  Phos  2.8     10-14  Mg     2.1     10-14    TPro  6.1  /  Alb  2.0<L>  /  TBili  0.6  /  DBili  x   /  AST  77<H>  /  ALT  154<H>  /  AlkPhos  142<H>  10-14    Creatinine Trend: 0.99<--, 1.31<--, 1.43<--, 1.62<--, 1.95<--, 2.45<--    COAGS:           T(C): 37.6 (10-14-21 @ 12:00), Max: 37.7 (10-13-21 @ 23:00)  HR: 76 (10-14-21 @ 12:00) (70 - 85)  BP: 131/54 (10-14-21 @ 12:00) (109/60 - 166/67)  RR: 18 (10-14-21 @ 12:00) (9 - 32)  SpO2: 100% (10-14-21 @ 12:00) (93% - 100%)  Wt(kg): --    I&O's Summary    13 Oct 2021 07:01  -  14 Oct 2021 07:00  --------------------------------------------------------  IN: 915 mL / OUT: 1575 mL / NET: -660 mL    14 Oct 2021 07:01  -  14 Oct 2021 12:49  --------------------------------------------------------  IN: 300 mL / OUT: 260 mL / NET: 40 mL        HEENT:  (-)icterus (-)pallor  CV: N S1 S2 1/6 TRINIDAD (+)2 Pulses B/l  Resp:  Coarse sounds B/L, normal effort  GI: (+) BS Soft, NT, ND  Lymph:  (-)Edema, (-)obvious lymphadenopathy  Skin: Warm to touch, Normal turgor  Psych: Unable to adequately  mood and affect    Tele: SR      ASSESSMENT/PLAN: 	76y  Male PMH DM on insulin, HTN, HLD, normal lV fx moderate to severe AS 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    - monitor crt, nephrology f/u appreciated  - Abx per primary team  - Echo noted, Severe AS, EF 40-45%   - He will need a SABIHA and R+L heart cath when he recovers and has no active infection  - Cont medical management of CAD  - Pt. with new onset PAF -  on lovenox therapeutic doses   - follow up thoracic surgery , s/p Pig tail  - cont supportive care     Zak Wilkins MD, [facility]  BEEPER [phone]   C A R D I O L O G Y  **********************************    DATE OF SERVICE: 10-14-21    Extubated yesterday, appears comfortable    Review of symptoms otherwise negative.    apixaban 5 milliGRAM(s) Oral two times a day  aspirin  chewable 81 milliGRAM(s) Oral daily  atorvastatin 80 milliGRAM(s) Oral at bedtime  chlorhexidine 2% Cloths 1 Application(s) Topical <User Schedule>  collagenase Ointment 1 Application(s) Topical daily  cyanocobalamin 1000 MICROGram(s) Oral daily  ergocalciferol 62758 Unit(s) Oral <User Schedule>  ferrous    sulfate Liquid 300 milliGRAM(s) Enteral Tube daily  finasteride 5 milliGRAM(s) Oral daily  gabapentin 100 milliGRAM(s) Oral three times a day  insulin lispro (ADMELOG) corrective regimen sliding scale   SubCutaneous every 6 hours  meropenem  IVPB 1000 milliGRAM(s) IV Intermittent every 12 hours  metoprolol tartrate 25 milliGRAM(s) Oral two times a day  NIFEdipine XL 60 milliGRAM(s) Oral daily  ondansetron Injectable 4 milliGRAM(s) IV Push three times a day PRN  pantoprazole  Injectable 40 milliGRAM(s) IV Push at bedtime  sodium chloride 0.9% lock flush 10 milliLiter(s) IV Push every 1 hour PRN  vancomycin  IVPB 1250 milliGRAM(s) IV Intermittent every 12 hours                            8.3    5.84  )-----------( 172      ( 14 Oct 2021 03:44 )             26.4       Hemoglobin: 8.3 g/dL (10-14 @ 03:44)  Hemoglobin: 8.6 g/dL (10-13 @ 04:58)  Hemoglobin: 8.1 g/dL (10-12 @ 12:13)  Hemoglobin: 7.8 g/dL (10-12 @ 04:34)  Hemoglobin: 8.5 g/dL (10-11 @ 05:44)      10-14    144  |  111<H>  |  17  ----------------------------<  133<H>  4.3   |  29  |  0.99    Ca    8.7      14 Oct 2021 03:44  Phos  2.8     10-14  Mg     2.1     10-14    TPro  6.1  /  Alb  2.0<L>  /  TBili  0.6  /  DBili  x   /  AST  77<H>  /  ALT  154<H>  /  AlkPhos  142<H>  10-14    Creatinine Trend: 0.99<--, 1.31<--, 1.43<--, 1.62<--, 1.95<--, 2.45<--    COAGS:           T(C): 37.6 (10-14-21 @ 12:00), Max: 37.7 (10-13-21 @ 23:00)  HR: 76 (10-14-21 @ 12:00) (70 - 85)  BP: 131/54 (10-14-21 @ 12:00) (109/60 - 166/67)  RR: 18 (10-14-21 @ 12:00) (9 - 32)  SpO2: 100% (10-14-21 @ 12:00) (93% - 100%)  Wt(kg): --    I&O's Summary    13 Oct 2021 07:01  -  14 Oct 2021 07:00  --------------------------------------------------------  IN: 915 mL / OUT: 1575 mL / NET: -660 mL    14 Oct 2021 07:01  -  14 Oct 2021 12:49  --------------------------------------------------------  IN: 300 mL / OUT: 260 mL / NET: 40 mL        HEENT:  (-)icterus (-)pallor  CV: N S1 S2 1/6 TRINIDAD (+)2 Pulses B/l  Resp:  Coarse sounds B/L, normal effort  GI: (+) BS Soft, NT, ND  Lymph:  (-)Edema, (-)obvious lymphadenopathy  Skin: Warm to touch, Normal turgor  Psych: Unable to adequately  mood and affect    Tele: SR      ASSESSMENT/PLAN: 	76y  Male PMH DM on insulin, HTN, HLD, normal lV fx moderate to severe AS 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    - monitor crt, nephrology f/u appreciated  - Abx per primary team  - Echo noted, Severe AS, EF 40-45%   - He will need a SABIHA and R+L heart cath when he recovers and has no active infection  - Cont medical management of CAD  - Pt. with new onset PAF -  on eliquis  - follow up thoracic surgery , s/p Pig tail  - cont supportive care     Zak Wilkins MD, [facility]  BEEPER [phone]

## 2021-10-14 NOTE — SWALLOW BEDSIDE ASSESSMENT ADULT - PHARYNGEAL PHASE
Absent laryngeal elevation Decreased laryngeal elevation/Multiple swallows Decreased laryngeal elevation Decreased laryngeal elevation/Delayed cough post oral intake

## 2021-10-14 NOTE — PROGRESS NOTE ADULT - ASSESSMENT
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  As per daughter, patient was taking tylenol which did not help his pain prompting the patient to ask his daughter to take him to the hospital. ON admission, he has no fever or Leukocytosis. The Xray of Right foot shows no Osteomyelitis but MRI of Right foot shows Lateral soft tissue wound with marrow signal abnormality throughout the fifth metatarsal which is consistent of Osteomyelitis. He has seen by Podiatry and wound culture has sent, Zosyn and Vancomycin has started. The ID consult requested to assist with further evaluation and antibiotic management.     # Right Fifth metatarsal DFU with drainage and Osteomyelitis- wound cx grew Enterococcus, Aeromonas and Klebsiella - Zosyn is the ideal to cover All organisms but kidney function is worsening, hence change to Unasyn and Levaquin until kidney function is improved - The pathology shows Bone with acute osteomyelitis and necrotic periosteal tissue.   # OREN- s/p urinary retention -s/p ibrahim catheter - s/p discontinue ACEI  # RLL pneumonia- most likely Aspiration, S/p Vomiting - On Unasyn  # Large bowel distension  # Candiduria- 9/22/21  # Pulmonary edema with bilateral moderate to large pleural effusions- on CT chest, 10/5/21- s/p intubation 10/7- s/p Right sided chest tube placement by CT team, 10/8/21  # COVID Exposure - PCR negative as of  10/11/21    would recommend:    1. Care of CT as per CT surgery team  2. Please hold Vancomycin since level is high and adjust Meropenem to 1 g q 8hours based on crcl  3. Wound care as per Podiatry  4. Aspiration precaution    d/w ICU team     Attending Attestation:    Spent more than 35 minutes on total encounter, more than 50 % of the visit was spent counseling and/or coordinating care by the Attending physician.

## 2021-10-15 DIAGNOSIS — J96.01 ACUTE RESPIRATORY FAILURE WITH HYPOXIA: ICD-10-CM

## 2021-10-15 DIAGNOSIS — Z20.822 CONTACT WITH AND (SUSPECTED) EXPOSURE TO COVID-19: ICD-10-CM

## 2021-10-15 DIAGNOSIS — I25.10 ATHEROSCLEROTIC HEART DISEASE OF NATIVE CORONARY ARTERY WITHOUT ANGINA PECTORIS: ICD-10-CM

## 2021-10-15 DIAGNOSIS — I48.91 UNSPECIFIED ATRIAL FIBRILLATION: ICD-10-CM

## 2021-10-15 LAB
ALBUMIN SERPL ELPH-MCNC: 2.1 G/DL — LOW (ref 3.5–5)
ALP SERPL-CCNC: 139 U/L — HIGH (ref 40–120)
ALT FLD-CCNC: 126 U/L DA — HIGH (ref 10–60)
ANION GAP SERPL CALC-SCNC: 4 MMOL/L — LOW (ref 5–17)
AST SERPL-CCNC: 63 U/L — HIGH (ref 10–40)
BILIRUB SERPL-MCNC: 0.6 MG/DL — SIGNIFICANT CHANGE UP (ref 0.2–1.2)
BUN SERPL-MCNC: 17 MG/DL — SIGNIFICANT CHANGE UP (ref 7–18)
CALCIUM SERPL-MCNC: 8.6 MG/DL — SIGNIFICANT CHANGE UP (ref 8.4–10.5)
CHLORIDE SERPL-SCNC: 111 MMOL/L — HIGH (ref 96–108)
CO2 SERPL-SCNC: 29 MMOL/L — SIGNIFICANT CHANGE UP (ref 22–31)
CREAT SERPL-MCNC: 0.99 MG/DL — SIGNIFICANT CHANGE UP (ref 0.5–1.3)
GLUCOSE BLDC GLUCOMTR-MCNC: 106 MG/DL — HIGH (ref 70–99)
GLUCOSE BLDC GLUCOMTR-MCNC: 107 MG/DL — HIGH (ref 70–99)
GLUCOSE BLDC GLUCOMTR-MCNC: 114 MG/DL — HIGH (ref 70–99)
GLUCOSE BLDC GLUCOMTR-MCNC: 128 MG/DL — HIGH (ref 70–99)
GLUCOSE BLDC GLUCOMTR-MCNC: 132 MG/DL — HIGH (ref 70–99)
GLUCOSE SERPL-MCNC: 118 MG/DL — HIGH (ref 70–99)
HCT VFR BLD CALC: 25.8 % — LOW (ref 39–50)
HCT VFR BLD CALC: 26.4 % — LOW (ref 39–50)
HGB BLD-MCNC: 8.2 G/DL — LOW (ref 13–17)
HGB BLD-MCNC: 8.4 G/DL — LOW (ref 13–17)
MAGNESIUM SERPL-MCNC: 2.1 MG/DL — SIGNIFICANT CHANGE UP (ref 1.6–2.6)
MCHC RBC-ENTMCNC: 29.5 PG — SIGNIFICANT CHANGE UP (ref 27–34)
MCHC RBC-ENTMCNC: 29.5 PG — SIGNIFICANT CHANGE UP (ref 27–34)
MCHC RBC-ENTMCNC: 31.8 GM/DL — LOW (ref 32–36)
MCHC RBC-ENTMCNC: 31.8 GM/DL — LOW (ref 32–36)
MCV RBC AUTO: 92.6 FL — SIGNIFICANT CHANGE UP (ref 80–100)
MCV RBC AUTO: 92.8 FL — SIGNIFICANT CHANGE UP (ref 80–100)
NRBC # BLD: 0 /100 WBCS — SIGNIFICANT CHANGE UP (ref 0–0)
NRBC # BLD: 0 /100 WBCS — SIGNIFICANT CHANGE UP (ref 0–0)
PHOSPHATE SERPL-MCNC: 3.1 MG/DL — SIGNIFICANT CHANGE UP (ref 2.5–4.5)
PLATELET # BLD AUTO: 181 K/UL — SIGNIFICANT CHANGE UP (ref 150–400)
PLATELET # BLD AUTO: 184 K/UL — SIGNIFICANT CHANGE UP (ref 150–400)
POTASSIUM SERPL-MCNC: 4.5 MMOL/L — SIGNIFICANT CHANGE UP (ref 3.5–5.3)
POTASSIUM SERPL-SCNC: 4.5 MMOL/L — SIGNIFICANT CHANGE UP (ref 3.5–5.3)
PROT SERPL-MCNC: 6.1 G/DL — SIGNIFICANT CHANGE UP (ref 6–8.3)
RBC # BLD: 2.78 M/UL — LOW (ref 4.2–5.8)
RBC # BLD: 2.85 M/UL — LOW (ref 4.2–5.8)
RBC # FLD: 14.7 % — HIGH (ref 10.3–14.5)
RBC # FLD: 14.7 % — HIGH (ref 10.3–14.5)
SODIUM SERPL-SCNC: 144 MMOL/L — SIGNIFICANT CHANGE UP (ref 135–145)
WBC # BLD: 5.16 K/UL — SIGNIFICANT CHANGE UP (ref 3.8–10.5)
WBC # BLD: 5.22 K/UL — SIGNIFICANT CHANGE UP (ref 3.8–10.5)
WBC # FLD AUTO: 5.16 K/UL — SIGNIFICANT CHANGE UP (ref 3.8–10.5)
WBC # FLD AUTO: 5.22 K/UL — SIGNIFICANT CHANGE UP (ref 3.8–10.5)

## 2021-10-15 PROCEDURE — 99232 SBSQ HOSP IP/OBS MODERATE 35: CPT

## 2021-10-15 PROCEDURE — 71045 X-RAY EXAM CHEST 1 VIEW: CPT | Mod: 26,76

## 2021-10-15 RX ADMIN — APIXABAN 5 MILLIGRAM(S): 2.5 TABLET, FILM COATED ORAL at 17:53

## 2021-10-15 RX ADMIN — Medication 25 MILLIGRAM(S): at 05:01

## 2021-10-15 RX ADMIN — Medication 300 MILLIGRAM(S): at 11:40

## 2021-10-15 RX ADMIN — HYDROMORPHONE HYDROCHLORIDE 1 MILLIGRAM(S): 2 INJECTION INTRAMUSCULAR; INTRAVENOUS; SUBCUTANEOUS at 00:20

## 2021-10-15 RX ADMIN — PREGABALIN 1000 MICROGRAM(S): 225 CAPSULE ORAL at 17:53

## 2021-10-15 RX ADMIN — GABAPENTIN 100 MILLIGRAM(S): 400 CAPSULE ORAL at 05:01

## 2021-10-15 RX ADMIN — APIXABAN 5 MILLIGRAM(S): 2.5 TABLET, FILM COATED ORAL at 05:01

## 2021-10-15 RX ADMIN — Medication 60 MILLIGRAM(S): at 05:01

## 2021-10-15 RX ADMIN — GABAPENTIN 100 MILLIGRAM(S): 400 CAPSULE ORAL at 22:28

## 2021-10-15 RX ADMIN — Medication 25 MILLIGRAM(S): at 17:54

## 2021-10-15 RX ADMIN — MEROPENEM 100 MILLIGRAM(S): 1 INJECTION INTRAVENOUS at 17:53

## 2021-10-15 RX ADMIN — Medication 250 MILLIGRAM(S): at 11:43

## 2021-10-15 RX ADMIN — Medication 81 MILLIGRAM(S): at 11:40

## 2021-10-15 RX ADMIN — Medication 1 APPLICATION(S): at 11:41

## 2021-10-15 RX ADMIN — GABAPENTIN 100 MILLIGRAM(S): 400 CAPSULE ORAL at 17:59

## 2021-10-15 RX ADMIN — PANTOPRAZOLE SODIUM 40 MILLIGRAM(S): 20 TABLET, DELAYED RELEASE ORAL at 22:28

## 2021-10-15 RX ADMIN — ATORVASTATIN CALCIUM 80 MILLIGRAM(S): 80 TABLET, FILM COATED ORAL at 22:28

## 2021-10-15 RX ADMIN — MEROPENEM 100 MILLIGRAM(S): 1 INJECTION INTRAVENOUS at 05:02

## 2021-10-15 RX ADMIN — FINASTERIDE 5 MILLIGRAM(S): 5 TABLET, FILM COATED ORAL at 17:53

## 2021-10-15 RX ADMIN — CHLORHEXIDINE GLUCONATE 1 APPLICATION(S): 213 SOLUTION TOPICAL at 05:02

## 2021-10-15 NOTE — PROGRESS NOTE ADULT - SUBJECTIVE AND OBJECTIVE BOX
Patient is seen and examined at the bed side, is afebrile. He has transferred out of ICU, doing better.       REVIEW OF SYSTEMS: Unable to obtain due to mental status      ALLERGIES: No Known Allergies      Vital Signs Last 24 Hrs  T(C): 36.6 (15 Oct 2021 13:59), Max: 37.5 (14 Oct 2021 20:15)  T(F): 97.8 (15 Oct 2021 13:59), Max: 99.5 (14 Oct 2021 20:15)  HR: 81 (15 Oct 2021 13:59) (68 - 83)  BP: 124/73 (15 Oct 2021 13:59) (124/73 - 160/56)  BP(mean): 81 (14 Oct 2021 21:00) (74 - 84)  RR: 20 (15 Oct 2021 13:59) (16 - 22)  SpO2: 100% (15 Oct 2021 13:59) (98% - 100%)      PHYSICAL EXAM:  GENERAL:  s/p extubated  CHEST/LUNG:  Not using accessory muscles, Right CT in placed   HEART: s1 and s2 present  ABDOMEN:  Mild distended   EXTREMITIES: Right foot bandage in placed   CNS: s/p extubated      LABS:                        8.4    5.22  )-----------( 184      ( 15 Oct 2021 06:39 )             26.4                           8.3    5.84  )-----------( 172      ( 14 Oct 2021 03:44 )             26.4         10-15    144  |  111<H>  |  17  ----------------------------<  118<H>  4.5   |  29  |  0.99    Ca    8.6      15 Oct 2021 03:57  Phos  3.1     10-15  Mg     2.1     10-15    TPro  6.1  /  Alb  2.1<L>  /  TBili  0.6  /  DBili  x   /  AST  63<H>  /  ALT  126<H>  /  AlkPhos  139<H>  10-15      10-13    145  |  111<H>  |  19<H>  ----------------------------<  199<H>  4.2   |  28  |  1.31<H>    Ca    8.6      13 Oct 2021 04:58  Phos  2.2     10-13  Mg     2.1     10-13    TPro  6.5  /  Alb  2.1<L>  /  TBili  0.5  /  DBili  x   /  AST  153<H>  /  ALT  227<H>  /  AlkPhos  180<H>  10-13      09-25    139  |  107  |  41<H>  ----------------------------<  134<H>  4.1   |  21<L>  |  4.05<H>    Ca    8.6      25 Sep 2021 06:40    TPro  6.6  /  Alb  2.3<L>  /  TBili  0.5  /  DBili  x   /  AST  25  /  ALT  15  /  AlkPhos  102  09-25        CAPILLARY BLOOD GLUCOSE  POCT Blood Glucose.: 134 mg/dL (17 Sep 2021 17:11)  POCT Blood Glucose.: 128 mg/dL (17 Sep 2021 11:06)  POCT Blood Glucose.: 157 mg/dL (17 Sep 2021 04:10)      Vancomycin Level, Trough (10.14.21 @ 11:32) : 21.8  Vancomycin Level, Trough (10.12.21 @ 12:13)   Vancomycin Level, Trough: 19.5:      MEDICATIONS  (STANDING):    apixaban 5 milliGRAM(s) Oral two times a day  aspirin  chewable 81 milliGRAM(s) Oral daily  atorvastatin 80 milliGRAM(s) Oral at bedtime  chlorhexidine 2% Cloths 1 Application(s) Topical <User Schedule>  collagenase Ointment 1 Application(s) Topical daily  cyanocobalamin 1000 MICROGram(s) Oral daily  ergocalciferol 69101 Unit(s) Oral <User Schedule>  ferrous    sulfate Liquid 300 milliGRAM(s) Enteral Tube daily  finasteride 5 milliGRAM(s) Oral daily  gabapentin 100 milliGRAM(s) Oral three times a day  insulin lispro (ADMELOG) corrective regimen sliding scale   SubCutaneous every 6 hours  meropenem  IVPB 1000 milliGRAM(s) IV Intermittent every 12 hours  metoprolol tartrate 25 milliGRAM(s) Oral two times a day  NIFEdipine XL 60 milliGRAM(s) Oral daily  pantoprazole  Injectable 40 milliGRAM(s) IV Push at bedtime  vancomycin  IVPB 1250 milliGRAM(s) IV Intermittent daily        RADIOLOGY & ADDITIONAL TESTS:    10/5/21 CT Chest No Cont (10.05.21 @ 14:07) Pulmonary edema with bilateral moderate to large pleural effusions. Underlying bilateral lower lobe compressive atelectasis versus pneumonia.  Mildly enlarged mediastinal lymph nodes, possibly reactive.      10/2/21: Xray Chest 1 View- PORTABLE-Routine (Xray Chest 1 View- PORTABLE-Routine in AM.) (10.02.21 @ 09:46) There is persistent bilateral perihilar/basilar diffuse airspace disease and/or RIGHT effusion.  Cardiomegaly.  No interval change.    Collected Date/Time: 9/22/2021 08:42 EDT   Received Date/Time: 9/23/2021 09:29 EDT   Surgical Pathology Report - Auth (Verified)   Specimen(s) Submitted   1 Right 5th Toe Wound Debridement r/o Osteomyelitis   Final Diagnosis   Right fifth toe; wound debridement:   - Bone with acute osteomyelitis and necrotic periosteal tissue.     9/28/21: US Abdomen Upper Quadrant Right (09.28.21 @ 12:13) Cholelithiasis without evidence of acute cholecystitis. Note is made of right pleural effusion    9/27/21: CT Abdomen and Pelvis w/ Oral Cont (09.27.21 @ 15:53) >No acute intra-abdominal pathology.    Small bilateral pleural effusions with compressive atelectasis of both lower lobes.    9/26/21: Xray Chest 1 View-PORTABLE IMMEDIATE (Xray Chest 1 View-PORTABLE IMMEDIATE .) (09.26.21 @ 15:28) : Small bilateral pleural effusions and mild pulmonary edema. A right lower lobe pneumonia is not excluded.      9/26/21: Xray Abdomen 1 View Portable, IMMEDIATE (Xray Abdomen 1 View Portable, IMMEDIATE .) (09.26.21 @ 15:28) : There is a nasogastric tube with its tip in the stomach. There is gaseous distention of the colon. No pathologic calcifications are seen. The osseous structures are intact with degenerative change is present in the spine.      9/17/21 : MR Foot No Cont, Right (09.17.21 @ 13:04) : Resection at the mid aspect of the fifth metatarsal. Lateral soft tissue wound with marrow signal abnormality throughout the fifth metatarsal and within the adjacent fourth metatarsal head which is nonspecific in the setting of recent surgery although osteomyelitis is suspected.    9/16/21 : Xray Foot AP + Lateral + Oblique, Right (09.16.21 @ 15:03) : No acute finding. No plain film evidence of osteomyelitis.        MICROBIOLOGY DATA:    COVID-19 PCR (10.11.21 @ 05:44)   COVID-19 PCR: NotDetec:   Urine Microscopic-Add On (NC) (09.22.21 @ 16:14)   Red Blood Cell - Urine: 0-2 /HPF   White Blood Cell - Urine: 3-5 /HPF   Bacteria: Moderate /HPF   Comment - Urine: yeast cells present   Epithelial Cells: Moderate /HPF     Culture - Tissue with Gram Stain (09.22.21 @ 13:54)   Gram Stain: No polymorphonuclear cells seen per low power field   No organisms seen per oil power field   Specimen Source: .Tissue Right 5th toe r/o osteomyeltis   Culture Results: No growth     COVID-19 David Domain Antibody (09.18.21 @ 12:55)   COVID-19 David Domain Antibody Result: >250.00:    Culture - Blood (09.17.21 @ 10:28)   Specimen Source: .Blood Blood-Peripheral   Culture Results: No growth to date.     Culture - Blood (09.17.21 @ 10:28)   Specimen Source: .Blood Blood-Venous   Culture Results: No growth to date.     Culture - Surgical Swab (09.16.21 @ 21:42)   - Amikacin: S <=16   - Amikacin: S <=16   - Amoxicillin/Clavulanic Acid: S <=8/4   - Ampicillin: R >16 These ampicillin results predict results for amoxicillin   - Ampicillin: S <=2 Predicts results to ampicillin/sulbactam, amoxacillin-clavulanate and piperacillin-tazobactam.   - Ampicillin/Sulbactam: S 8/4 Enterobacter, Citrobacter, and Serratia may develop resistance during prolonged therapy (3-4 days)   - Ampicillin/Sulbactam: R >16/8   - Aztreonam: S <=4   - Aztreonam: S <=4   - Cefazolin: R 16 Enterobacter, Citrobacter, and Serratia may develop resistance during prolonged therapy (3-4 days)   - Cefazolin: R >16   - Cefepime: S <=2   - Cefepime: S <=2   - Cefoxitin: S <=8   - Cefoxitin: S <=8   - Ceftazidime: S <=1   - Ceftriaxone: S <=1   - Ceftriaxone: S <=1 Enterobacter, Citrobacter, and Serratia may develop resistance during prolonged therapy   - Ciprofloxacin: S <=0.25   - Ciprofloxacin: S <=0.25   - Ertapenem: S <=0.5   - Gentamicin: S <=2   - Gentamicin: S <=2   - Imipenem: S <=1   - Levofloxacin: S <=0.5   - Levofloxacin: S <=0.5   - Meropenem: S <=1   - Meropenem: S <=1   - Piperacillin/Tazobactam: S <=8   - Piperacillin/Tazobactam: S <=8   - Tetra/Doxy: S 4   - Tobramycin: S <=2   - Trimethoprim/Sulfamethoxazole: S <=0.5/9.5   - Trimethoprim/Sulfamethoxazole: S <=0.5/9.5   - Vancomycin: S 2   Specimen Source: .Surgical Swab right foot wound   Culture Results:   Culture yields >4 types of aerobic and/or anaerobic bacteria   Call client services within 7 days if further workup is clinically   indicated. Culture includes   Rare Aeromonas hydrophila/caviae   Few Klebsiella oxytoca/Raoutella ornithinolytica   Few Enterococcus faecalis   Few Streptococcus agalactiae (Group B) isolated   Group B streptococci are susceptible to ampicillin,   penicillin and cefazolin, but may be resistant to   erythromycin and clindamycin.   Recommendations for intrapartum prophylaxis for Group B   streptococci are penicillin or ampicillin.   Organism Identification: Aeromonas hydrophila/caviae   Klebsiella oxytoca /Raoutella ornithinolytica   Enterococcus faecalis   Organism: Aeromonas hydrophila/caviae   Organism: Klebsiella oxytoca /Raoutella ornithinolytica   Organism: Enterococcus faecalis   COVID-19 PCR . (09.16.21 @ 22:24)   COVID-19 PCR: NotDetec:                 Patient is seen and examined at the bed side, is afebrile. He has transferred out of ICU, doing better. The kidney function stay normal.       REVIEW OF SYSTEMS: All other review systems are negative      ALLERGIES: No Known Allergies      Vital Signs Last 24 Hrs  T(C): 36.6 (15 Oct 2021 13:59), Max: 37.5 (14 Oct 2021 20:15)  T(F): 97.8 (15 Oct 2021 13:59), Max: 99.5 (14 Oct 2021 20:15)  HR: 81 (15 Oct 2021 13:59) (68 - 83)  BP: 124/73 (15 Oct 2021 13:59) (124/73 - 160/56)  BP(mean): 81 (14 Oct 2021 21:00) (74 - 84)  RR: 20 (15 Oct 2021 13:59) (16 - 22)  SpO2: 100% (15 Oct 2021 13:59) (98% - 100%)      PHYSICAL EXAM:  GENERAL:  s/p extubated  CHEST/LUNG:  Not using accessory muscles, s/p removal of Right CT   HEART: s1 and s2 present  ABDOMEN:  Mild distended   EXTREMITIES: Right foot bandage in placed   CNS: s/p extubated      LABS:                        8.4    5.22  )-----------( 184      ( 15 Oct 2021 06:39 )             26.4                           8.3    5.84  )-----------( 172      ( 14 Oct 2021 03:44 )             26.4         10-15    144  |  111<H>  |  17  ----------------------------<  118<H>  4.5   |  29  |  0.99    Ca    8.6      15 Oct 2021 03:57  Phos  3.1     10-15  Mg     2.1     10-15    TPro  6.1  /  Alb  2.1<L>  /  TBili  0.6  /  DBili  x   /  AST  63<H>  /  ALT  126<H>  /  AlkPhos  139<H>  10-15      10-13    145  |  111<H>  |  19<H>  ----------------------------<  199<H>  4.2   |  28  |  1.31<H>    Ca    8.6      13 Oct 2021 04:58  Phos  2.2     10-13  Mg     2.1     10-13    TPro  6.5  /  Alb  2.1<L>  /  TBili  0.5  /  DBili  x   /  AST  153<H>  /  ALT  227<H>  /  AlkPhos  180<H>  10-13      09-25    139  |  107  |  41<H>  ----------------------------<  134<H>  4.1   |  21<L>  |  4.05<H>    Ca    8.6      25 Sep 2021 06:40    TPro  6.6  /  Alb  2.3<L>  /  TBili  0.5  /  DBili  x   /  AST  25  /  ALT  15  /  AlkPhos  102  09-25        CAPILLARY BLOOD GLUCOSE  POCT Blood Glucose.: 134 mg/dL (17 Sep 2021 17:11)  POCT Blood Glucose.: 128 mg/dL (17 Sep 2021 11:06)  POCT Blood Glucose.: 157 mg/dL (17 Sep 2021 04:10)      Vancomycin Level, Trough (10.14.21 @ 11:32) : 21.8  Vancomycin Level, Trough (10.12.21 @ 12:13)   Vancomycin Level, Trough: 19.5:      MEDICATIONS  (STANDING):    apixaban 5 milliGRAM(s) Oral two times a day  aspirin  chewable 81 milliGRAM(s) Oral daily  atorvastatin 80 milliGRAM(s) Oral at bedtime  chlorhexidine 2% Cloths 1 Application(s) Topical <User Schedule>  collagenase Ointment 1 Application(s) Topical daily  cyanocobalamin 1000 MICROGram(s) Oral daily  ergocalciferol 46966 Unit(s) Oral <User Schedule>  ferrous    sulfate Liquid 300 milliGRAM(s) Enteral Tube daily  finasteride 5 milliGRAM(s) Oral daily  gabapentin 100 milliGRAM(s) Oral three times a day  insulin lispro (ADMELOG) corrective regimen sliding scale   SubCutaneous every 6 hours  meropenem  IVPB 1000 milliGRAM(s) IV Intermittent every 12 hours  metoprolol tartrate 25 milliGRAM(s) Oral two times a day  NIFEdipine XL 60 milliGRAM(s) Oral daily  pantoprazole  Injectable 40 milliGRAM(s) IV Push at bedtime  vancomycin  IVPB 1250 milliGRAM(s) IV Intermittent daily        RADIOLOGY & ADDITIONAL TESTS:    10/5/21 CT Chest No Cont (10.05.21 @ 14:07) Pulmonary edema with bilateral moderate to large pleural effusions. Underlying bilateral lower lobe compressive atelectasis versus pneumonia.  Mildly enlarged mediastinal lymph nodes, possibly reactive.      10/2/21: Xray Chest 1 View- PORTABLE-Routine (Xray Chest 1 View- PORTABLE-Routine in AM.) (10.02.21 @ 09:46) There is persistent bilateral perihilar/basilar diffuse airspace disease and/or RIGHT effusion.  Cardiomegaly.  No interval change.    Collected Date/Time: 9/22/2021 08:42 EDT   Received Date/Time: 9/23/2021 09:29 EDT   Surgical Pathology Report - Auth (Verified)   Specimen(s) Submitted   1 Right 5th Toe Wound Debridement r/o Osteomyelitis   Final Diagnosis   Right fifth toe; wound debridement:   - Bone with acute osteomyelitis and necrotic periosteal tissue.     9/28/21: US Abdomen Upper Quadrant Right (09.28.21 @ 12:13) Cholelithiasis without evidence of acute cholecystitis. Note is made of right pleural effusion    9/27/21: CT Abdomen and Pelvis w/ Oral Cont (09.27.21 @ 15:53) >No acute intra-abdominal pathology.    Small bilateral pleural effusions with compressive atelectasis of both lower lobes.    9/26/21: Xray Chest 1 View-PORTABLE IMMEDIATE (Xray Chest 1 View-PORTABLE IMMEDIATE .) (09.26.21 @ 15:28) : Small bilateral pleural effusions and mild pulmonary edema. A right lower lobe pneumonia is not excluded.      9/26/21: Xray Abdomen 1 View Portable, IMMEDIATE (Xray Abdomen 1 View Portable, IMMEDIATE .) (09.26.21 @ 15:28) : There is a nasogastric tube with its tip in the stomach. There is gaseous distention of the colon. No pathologic calcifications are seen. The osseous structures are intact with degenerative change is present in the spine.      9/17/21 : MR Foot No Cont, Right (09.17.21 @ 13:04) : Resection at the mid aspect of the fifth metatarsal. Lateral soft tissue wound with marrow signal abnormality throughout the fifth metatarsal and within the adjacent fourth metatarsal head which is nonspecific in the setting of recent surgery although osteomyelitis is suspected.    9/16/21 : Xray Foot AP + Lateral + Oblique, Right (09.16.21 @ 15:03) : No acute finding. No plain film evidence of osteomyelitis.        MICROBIOLOGY DATA:    COVID-19 PCR (10.11.21 @ 05:44)   COVID-19 PCR: NotDetec:   Urine Microscopic-Add On (NC) (09.22.21 @ 16:14)   Red Blood Cell - Urine: 0-2 /HPF   White Blood Cell - Urine: 3-5 /HPF   Bacteria: Moderate /HPF   Comment - Urine: yeast cells present   Epithelial Cells: Moderate /HPF     Culture - Tissue with Gram Stain (09.22.21 @ 13:54)   Gram Stain: No polymorphonuclear cells seen per low power field   No organisms seen per oil power field   Specimen Source: .Tissue Right 5th toe r/o osteomyeltis   Culture Results: No growth     COVID-19 David Domain Antibody (09.18.21 @ 12:55)   COVID-19 David Domain Antibody Result: >250.00:    Culture - Blood (09.17.21 @ 10:28)   Specimen Source: .Blood Blood-Peripheral   Culture Results: No growth to date.     Culture - Blood (09.17.21 @ 10:28)   Specimen Source: .Blood Blood-Venous   Culture Results: No growth to date.     Culture - Surgical Swab (09.16.21 @ 21:42)   - Amikacin: S <=16   - Amikacin: S <=16   - Amoxicillin/Clavulanic Acid: S <=8/4   - Ampicillin: R >16 These ampicillin results predict results for amoxicillin   - Ampicillin: S <=2 Predicts results to ampicillin/sulbactam, amoxacillin-clavulanate and piperacillin-tazobactam.   - Ampicillin/Sulbactam: S 8/4 Enterobacter, Citrobacter, and Serratia may develop resistance during prolonged therapy (3-4 days)   - Ampicillin/Sulbactam: R >16/8   - Aztreonam: S <=4   - Aztreonam: S <=4   - Cefazolin: R 16 Enterobacter, Citrobacter, and Serratia may develop resistance during prolonged therapy (3-4 days)   - Cefazolin: R >16   - Cefepime: S <=2   - Cefepime: S <=2   - Cefoxitin: S <=8   - Cefoxitin: S <=8   - Ceftazidime: S <=1   - Ceftriaxone: S <=1   - Ceftriaxone: S <=1 Enterobacter, Citrobacter, and Serratia may develop resistance during prolonged therapy   - Ciprofloxacin: S <=0.25   - Ciprofloxacin: S <=0.25   - Ertapenem: S <=0.5   - Gentamicin: S <=2   - Gentamicin: S <=2   - Imipenem: S <=1   - Levofloxacin: S <=0.5   - Levofloxacin: S <=0.5   - Meropenem: S <=1   - Meropenem: S <=1   - Piperacillin/Tazobactam: S <=8   - Piperacillin/Tazobactam: S <=8   - Tetra/Doxy: S 4   - Tobramycin: S <=2   - Trimethoprim/Sulfamethoxazole: S <=0.5/9.5   - Trimethoprim/Sulfamethoxazole: S <=0.5/9.5   - Vancomycin: S 2   Specimen Source: .Surgical Swab right foot wound   Culture Results:   Culture yields >4 types of aerobic and/or anaerobic bacteria   Call client services within 7 days if further workup is clinically   indicated. Culture includes   Rare Aeromonas hydrophila/caviae   Few Klebsiella oxytoca/Raoutella ornithinolytica   Few Enterococcus faecalis   Few Streptococcus agalactiae (Group B) isolated   Group B streptococci are susceptible to ampicillin,   penicillin and cefazolin, but may be resistant to   erythromycin and clindamycin.   Recommendations for intrapartum prophylaxis for Group B   streptococci are penicillin or ampicillin.   Organism Identification: Aeromonas hydrophila/caviae   Klebsiella oxytoca /Raoutella ornithinolytica   Enterococcus faecalis   Organism: Aeromonas hydrophila/caviae   Organism: Klebsiella oxytoca /Raoutella ornithinolytica   Organism: Enterococcus faecalis   COVID-19 PCR . (09.16.21 @ 22:24)   COVID-19 PCR: NotDetec:

## 2021-10-15 NOTE — PROGRESS NOTE ADULT - ASSESSMENT
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 s/p OR debridement today. Nephrology consulted for abrupt rise in SCr.     1. OREN- Recurrent ATN in the setting of infection. Lisinopril discontinued 9/22. ATN resolving with good urine o/p. Now off diuretics; likely recovery phase   s/p CT chest with b/l mod to large pleural effusion R>L. s/p rt pigtail catheter. Monitor lytes  Kidney/ Bladder US with large distended bladder s/p ibrahim placement on 9/23, then removed. s/p bladder scan 10/4 with 1L urine for which ibrahim was reinserted; however; renal function did not improve post ibrahim; less likely due to obstructive uropathy.  No peripheral eosinophilia- no signs of AIN. C3 & C4 wnl with neg ASO- no signs of PIGN. Strict I/Os. Avoid nephrotoxins/ NSAIDs/ RCA. Monitor BMP.    2. 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 Will defer secondary w/u as an outpt. Avoid nephrotoxins  3. HTN 2/2 CKD- BP borderline. Continue with current anti-hypertensive medications. Monitor BP.  4. Acute osteomyelitis- s/p debridement 9/22. Pt now on Vanco and meropenem (renally dosed). Vanco trough elevated for which dose was decreased on 10/14. Monitor vanco trough. Plan as per ID      [facility] NEPHROLOGY  Bryn Johnson M.D.  Domingo Booth D.O.  Zakia Rodriguez M.D.  Zonia Adams, MSN, ANP-C  [phone]    [id number] [place] [address]

## 2021-10-15 NOTE — PROGRESS NOTE ADULT - PROBLEM SELECTOR PLAN 1
S/P RIGHT 5TH RAY RESECTION [8/19] AND S/P DEBRIDEMENT, GRAFT APPLICATION, BONE BX AND WOUND VAC APPLICATION 9/22  ** RECENT ADMISSION FOR RIGHT DIABETIC FOOT ULCER, CELLULITIS, OSTEOMYELITIS RIGHT 5th METATARSAL S/P RIGHT 5TH PARTIAL RAY AMPUTATION [8/20] - BONE PATHOLOGY W/ CLEAN MARGINS  # SUSPECT RIGHT FOOT OSTEOMYELITIS   - S/P VANCOMYCIN AND ZOSYN ; NOW ON UNASYN AND LEVAQUIN GIVEN OREN; PER ID SWITCH TO ZOSYN UNTIL 11/3  - RECENTLY HAD SIMON W/ MILD PAD  - REVIEWED WOUND CX [ ENTEROCOCCUS, AEROMONAS, KLEBSIELLA] AND BCX  - BONE BX - acute osteomyelitis and necrotic periosteal tissue.       - PICC LINE PLACED TO COMPLETE 6 WEEKS OF ANTIBIOTICS - PER ID SWITCH TO ZOSYN UNTIL 11/3

## 2021-10-15 NOTE — PROGRESS NOTE ADULT - SUBJECTIVE AND OBJECTIVE BOX
Chief Complaint:  Patient is a 76y old  Male who presents with a chief complaint of R Foot Pain (14 Oct 2021 14:37)      Reason for consult:    Interval Events:     Hospital Medications:  apixaban 5 milliGRAM(s) Oral two times a day  aspirin  chewable 81 milliGRAM(s) Oral daily  atorvastatin 80 milliGRAM(s) Oral at bedtime  chlorhexidine 2% Cloths 1 Application(s) Topical <User Schedule>  collagenase Ointment 1 Application(s) Topical daily  cyanocobalamin 1000 MICROGram(s) Oral daily  ergocalciferol 94100 Unit(s) Oral <User Schedule>  ferrous    sulfate Liquid 300 milliGRAM(s) Enteral Tube daily  finasteride 5 milliGRAM(s) Oral daily  gabapentin 100 milliGRAM(s) Oral three times a day  insulin lispro (ADMELOG) corrective regimen sliding scale   SubCutaneous every 6 hours  meropenem  IVPB 1000 milliGRAM(s) IV Intermittent every 12 hours  metoprolol tartrate 25 milliGRAM(s) Oral two times a day  NIFEdipine XL 60 milliGRAM(s) Oral daily  ondansetron Injectable 4 milliGRAM(s) IV Push three times a day PRN  pantoprazole  Injectable 40 milliGRAM(s) IV Push at bedtime  sodium chloride 0.9% lock flush 10 milliLiter(s) IV Push every 1 hour PRN  vancomycin  IVPB 1250 milliGRAM(s) IV Intermittent daily      ROS:   General:  No  fevers, chills, night sweats, fatigue  Eyes:  Good vision, no reported pain  ENT:  No sore throat, pain, runny nose  CV:  No pain, palpitations  Pulm:  No dyspnea, cough  GI:  See HPI, otherwise negative  :  No  incontinence, nocturia  Muscle:  No pain, weakness  Neuro:  No memory problems  Psych:  No insomnia, mood problems, depression  Endocrine:  No polyuria, polydipsia, cold/heat intolerance  Heme:  No petechiae, ecchymosis, easy bruisability  Skin:  No rash    PHYSICAL EXAM:   Vital Signs:  Vital Signs Last 24 Hrs  T(C): 36.4 (15 Oct 2021 06:06), Max: 37.6 (14 Oct 2021 12:00)  T(F): 97.5 (15 Oct 2021 06:06), Max: 99.7 (14 Oct 2021 12:00)  HR: 83 (15 Oct 2021 06:06) (68 - 83)  BP: 150/77 (15 Oct 2021 06:06) (126/49 - 160/56)  BP(mean): 81 (14 Oct 2021 21:00) (68 - 84)  RR: 18 (15 Oct 2021 06:06) (15 - 24)  SpO2: 100% (15 Oct 2021 06:06) (98% - 100%)  Daily     Daily     GENERAL: no acute distress  NEURO: alert, no asterixis  HEENT: anicteric sclera, no conjunctival pallor appreciated  CHEST: no respiratory distress, no accessory muscle use  CARDIAC: regular rate, rhythm  ABDOMEN: soft, non-tender, non-distended, no rebound or guarding  EXTREMITIES: warm, well perfused, no edema  SKIN: no lesions noted    LABS: reviewed                        8.4    5.22  )-----------( 184      ( 15 Oct 2021 06:39 )             26.4     10-15    144  |  111<H>  |  17  ----------------------------<  118<H>  4.5   |  29  |  0.99    Ca    8.6      15 Oct 2021 03:57  Phos  3.1     10-15  Mg     2.1     10-15    TPro  6.1  /  Alb  2.1<L>  /  TBili  0.6  /  DBili  x   /  AST  63<H>  /  ALT  126<H>  /  AlkPhos  139<H>  10-15    LIVER FUNCTIONS - ( 15 Oct 2021 03:57 )  Alb: 2.1 g/dL / Pro: 6.1 g/dL / ALK PHOS: 139 U/L / ALT: 126 U/L DA / AST: 63 U/L / GGT: x             Interval Diagnostic Studies: see sunrise for full report   Chief Complaint:  Patient is a 76y old  Male who presents with a chief complaint of R Foot Pain (14 Oct 2021 14:37)      Reason for consult: Abnormal liver enzymes    Interval Events: Patient was seen and examined at bedside. Was transferred out of ICU. Hemodynamically stable. Liver enzymes continued to improve.     Hospital Medications:  apixaban 5 milliGRAM(s) Oral two times a day  aspirin  chewable 81 milliGRAM(s) Oral daily  atorvastatin 80 milliGRAM(s) Oral at bedtime  chlorhexidine 2% Cloths 1 Application(s) Topical <User Schedule>  collagenase Ointment 1 Application(s) Topical daily  cyanocobalamin 1000 MICROGram(s) Oral daily  ergocalciferol 19171 Unit(s) Oral <User Schedule>  ferrous    sulfate Liquid 300 milliGRAM(s) Enteral Tube daily  finasteride 5 milliGRAM(s) Oral daily  gabapentin 100 milliGRAM(s) Oral three times a day  insulin lispro (ADMELOG) corrective regimen sliding scale   SubCutaneous every 6 hours  meropenem  IVPB 1000 milliGRAM(s) IV Intermittent every 12 hours  metoprolol tartrate 25 milliGRAM(s) Oral two times a day  NIFEdipine XL 60 milliGRAM(s) Oral daily  ondansetron Injectable 4 milliGRAM(s) IV Push three times a day PRN  pantoprazole  Injectable 40 milliGRAM(s) IV Push at bedtime  sodium chloride 0.9% lock flush 10 milliLiter(s) IV Push every 1 hour PRN  vancomycin  IVPB 1250 milliGRAM(s) IV Intermittent daily      ROS:   General:  No  fevers, chills, night sweats, fatigue  Eyes:  Good vision, no reported pain  ENT:  No sore throat, pain, runny nose  CV:  No pain, palpitations  Pulm:  No dyspnea, cough  GI:  See HPI, otherwise negative  :  No  incontinence, nocturia  Muscle:  No pain, weakness  Neuro:  No memory problems  Psych:  No insomnia, mood problems, depression  Endocrine:  No polyuria, polydipsia, cold/heat intolerance  Heme:  No petechiae, ecchymosis, easy bruisability  Skin:  No rash    PHYSICAL EXAM:   Vital Signs:  Vital Signs Last 24 Hrs  T(C): 36.4 (15 Oct 2021 06:06), Max: 37.6 (14 Oct 2021 12:00)  T(F): 97.5 (15 Oct 2021 06:06), Max: 99.7 (14 Oct 2021 12:00)  HR: 83 (15 Oct 2021 06:06) (68 - 83)  BP: 150/77 (15 Oct 2021 06:06) (126/49 - 160/56)  BP(mean): 81 (14 Oct 2021 21:00) (68 - 84)  RR: 18 (15 Oct 2021 06:06) (15 - 24)  SpO2: 100% (15 Oct 2021 06:06) (98% - 100%)  Daily     Daily     GENERAL: no acute distress  NEURO: alert, no asterixis  HEENT: anicteric sclera, no conjunctival pallor appreciated  CHEST: no respiratory distress, no accessory muscle use  CARDIAC: regular rate, rhythm  ABDOMEN: soft, non-tender, non-distended, no rebound or guarding  EXTREMITIES: warm, well perfused, no edema  SKIN: no lesions noted    LABS: reviewed                        8.4    5.22  )-----------( 184      ( 15 Oct 2021 06:39 )             26.4     10-15    144  |  111<H>  |  17  ----------------------------<  118<H>  4.5   |  29  |  0.99    Ca    8.6      15 Oct 2021 03:57  Phos  3.1     10-15  Mg     2.1     10-15    TPro  6.1  /  Alb  2.1<L>  /  TBili  0.6  /  DBili  x   /  AST  63<H>  /  ALT  126<H>  /  AlkPhos  139<H>  10-15    LIVER FUNCTIONS - ( 15 Oct 2021 03:57 )  Alb: 2.1 g/dL / Pro: 6.1 g/dL / ALK PHOS: 139 U/L / ALT: 126 U/L DA / AST: 63 U/L / GGT: x             Interval Diagnostic Studies: see sunrise for full report   Chief Complaint:  Patient is a 76y old  Male who presents with a chief complaint of R Foot Pain (14 Oct 2021 14:37)      Reason for consult: Abnormal liver enzymes    Interval Events: Patient was seen and examined at bedside. Was transferred out of ICU. Chest tube was removed. Hemodynamically stable. Liver enzymes continued to improve.     Hospital Medications:  apixaban 5 milliGRAM(s) Oral two times a day  aspirin  chewable 81 milliGRAM(s) Oral daily  atorvastatin 80 milliGRAM(s) Oral at bedtime  chlorhexidine 2% Cloths 1 Application(s) Topical <User Schedule>  collagenase Ointment 1 Application(s) Topical daily  cyanocobalamin 1000 MICROGram(s) Oral daily  ergocalciferol 46672 Unit(s) Oral <User Schedule>  ferrous    sulfate Liquid 300 milliGRAM(s) Enteral Tube daily  finasteride 5 milliGRAM(s) Oral daily  gabapentin 100 milliGRAM(s) Oral three times a day  insulin lispro (ADMELOG) corrective regimen sliding scale   SubCutaneous every 6 hours  meropenem  IVPB 1000 milliGRAM(s) IV Intermittent every 12 hours  metoprolol tartrate 25 milliGRAM(s) Oral two times a day  NIFEdipine XL 60 milliGRAM(s) Oral daily  ondansetron Injectable 4 milliGRAM(s) IV Push three times a day PRN  pantoprazole  Injectable 40 milliGRAM(s) IV Push at bedtime  sodium chloride 0.9% lock flush 10 milliLiter(s) IV Push every 1 hour PRN  vancomycin  IVPB 1250 milliGRAM(s) IV Intermittent daily      ROS:   Unable to obtain detailed ROS b/o patient condition, but afebrile, denies any pain    PHYSICAL EXAM:   Vital Signs:  Vital Signs Last 24 Hrs  T(C): 36.4 (15 Oct 2021 06:06), Max: 37.6 (14 Oct 2021 12:00)  T(F): 97.5 (15 Oct 2021 06:06), Max: 99.7 (14 Oct 2021 12:00)  HR: 83 (15 Oct 2021 06:06) (68 - 83)  BP: 150/77 (15 Oct 2021 06:06) (126/49 - 160/56)  BP(mean): 81 (14 Oct 2021 21:00) (68 - 84)  RR: 18 (15 Oct 2021 06:06) (15 - 24)  SpO2: 100% (15 Oct 2021 06:06) (98% - 100%)  Daily     Daily     GENERAL: no acute distress  NEURO: awake, alert, oriented to self and partially to place, not in time  HEENT: anicteric sclera, no conjunctival pallor appreciated  CHEST: no respiratory distress, no accessory muscle use  CARDIAC: regular rate, rhythm  ABDOMEN: soft, non-tender, non-distended, no rebound or guarding, BS+  EXTREMITIES: warm, well perfused, no edema      LABS: reviewed                        8.4    5.22  )-----------( 184      ( 15 Oct 2021 06:39 )             26.4     10-15    144  |  111<H>  |  17  ----------------------------<  118<H>  4.5   |  29  |  0.99    Ca    8.6      15 Oct 2021 03:57  Phos  3.1     10-15  Mg     2.1     10-15    TPro  6.1  /  Alb  2.1<L>  /  TBili  0.6  /  DBili  x   /  AST  63<H>  /  ALT  126<H>  /  AlkPhos  139<H>  10-15    LIVER FUNCTIONS - ( 15 Oct 2021 03:57 )  Alb: 2.1 g/dL / Pro: 6.1 g/dL / ALK PHOS: 139 U/L / ALT: 126 U/L DA / AST: 63 U/L / GGT: x             Interval Diagnostic Studies: see sunrise for full report

## 2021-10-15 NOTE — PROGRESS NOTE ADULT - ASSESSMENT
76M with b/l Pleural effusion, right greater than left, s/p R pigtail placement 10/8    - Continue pigtail to waterseal, monitor output   - F/u AM CXR  - If output remains low will remove pigtail today

## 2021-10-15 NOTE — PROGRESS NOTE ADULT - ASSESSMENT
A:   s/p R 5th ray resection - 8/19  s/p R 5th wound debridement, graft application, bone biopsy and wound vac application 9/22       P:  Pt seen and evaluated   Wound was evaluated  Applied adaptic and santyl to the right dorsal wound  Applied adaptic and santyl to the lateral aspect of the right foot   Dressed right foot with DSD   Continue abx per ID recommendation  Continue offloading lower extremities in CAIR boots   Pod plan: continue with [facility]   Will follow while in house  Discussed with attending Dr. Vazquez, evaluated bedside with attending Dr. Lara

## 2021-10-15 NOTE — PROGRESS NOTE ADULT - PROBLEM SELECTOR PLAN 2
S/T PULMONARY EDEMA IN THE SETTING OF SEVERE AORTIC STENOSIS + ASPIRATION PNA; COMBINED SYSTOLIC + DIASTOLIC HF - S/P CT TUBE PLACEMENT [SET TO PLEUROVAC] - SWITCHED FROM LEVAQUIN TO VANCOMYCIN + MEROPENEM, LASIX, CARDIOLOGY CONSULT IN PROGRESS  - CRITICAL CARE CONSULT  - ASPIRATION EVENT WHEN PATIENT REMOVED NGT   - S/P LASIX ON 10/1 - 10/2, 10/4 AND 10/5  - CT CHEST W/ BILATERAL PLEURAL EFFUSIONS [10/5] - PULMONOLOGY CONSULT FOR POSSIBLE THORACENTESIS & WILL CONTINUE LASIX   - ALSO F/U REPEAT ECHOCARDIOGRAM  - CT SURGERY WAS CONSULTED BUT UNABLE TO PLACE PIGTAIL, THUS IR CONSULT IN PROGRESS FOR PIGTAIL PLACEMENT  - CHEST TUBE PLACED [10/8] - FLUID CONSISTENT WITH TRANSUDATIVE PROCESS    - NOTED REPEAT ECHO  - S/P EXTUBATION 10/13  -pigtail removed 10/15 CXR clear

## 2021-10-15 NOTE — CHART NOTE - NSCHARTNOTEFT_GEN_A_CORE
76 Male status post right pigtail on 10/8 with history of b/l pleural effusion. AM chest xray done and reviewed, minimal output reported from the right pigtail catheter. Right pigtail catheter removed at bedside, patient tolerated the procedure well. Follow up post catheter removal chest xray.

## 2021-10-15 NOTE — PROGRESS NOTE ADULT - SUBJECTIVE AND OBJECTIVE BOX
INTERVAL HPI/OVERNIGHT EVENTS:  No acute events overnight. Pt resting comfortably. No acute complaints. Denies SOB.     MEDICATIONS  (STANDING):  apixaban 5 milliGRAM(s) Oral two times a day  aspirin  chewable 81 milliGRAM(s) Oral daily  atorvastatin 80 milliGRAM(s) Oral at bedtime  chlorhexidine 2% Cloths 1 Application(s) Topical <User Schedule>  collagenase Ointment 1 Application(s) Topical daily  cyanocobalamin 1000 MICROGram(s) Oral daily  ergocalciferol 79378 Unit(s) Oral <User Schedule>  ferrous    sulfate Liquid 300 milliGRAM(s) Enteral Tube daily  finasteride 5 milliGRAM(s) Oral daily  gabapentin 100 milliGRAM(s) Oral three times a day  insulin lispro (ADMELOG) corrective regimen sliding scale   SubCutaneous every 6 hours  meropenem  IVPB 1000 milliGRAM(s) IV Intermittent every 12 hours  metoprolol tartrate 25 milliGRAM(s) Oral two times a day  NIFEdipine XL 60 milliGRAM(s) Oral daily  pantoprazole  Injectable 40 milliGRAM(s) IV Push at bedtime  vancomycin  IVPB 1250 milliGRAM(s) IV Intermittent daily    MEDICATIONS  (PRN):  ondansetron Injectable 4 milliGRAM(s) IV Push three times a day PRN Nausea and/or Vomiting  sodium chloride 0.9% lock flush 10 milliLiter(s) IV Push every 1 hour PRN Pre/post blood products, medications, blood draw, and to maintain line patency      Vital Signs Last 24 Hrs  T(C): 36.4 (15 Oct 2021 06:06), Max: 37.6 (14 Oct 2021 12:00)  T(F): 97.5 (15 Oct 2021 06:06), Max: 99.7 (14 Oct 2021 12:00)  HR: 83 (15 Oct 2021 06:06) (68 - 83)  BP: 150/77 (15 Oct 2021 06:06) (126/49 - 160/56)  BP(mean): 81 (14 Oct 2021 21:00) (68 - 84)  RR: 18 (15 Oct 2021 06:06) (15 - 24)  SpO2: 100% (15 Oct 2021 06:06) (98% - 100%)    Physical:  General: Alert and oriented, not in acute distress  Respiratory: Breathing unlabored; saturating well; no resp distress, equal chest rise  Chest: right chest pigtail to waterseal, no air leak; flushed at bedside; 20cc output over last 24hrs  : ibrahim catheter in place with yellow urine  GI: Rectal tube in place with stool   Extremities: in protective boots; RLE with ace wrap    I&O's Detail  14 Oct 2021 07:01  -  15 Oct 2021 07:00  --------------------------------------------------------  IN:    Enteral Tube Flush: 100 mL    Glucerna 1.5: 200 mL    IV PiggyBack: 50 mL  Total IN: 350 mL    OUT:    Chest Tube (mL): 20 mL    Indwelling Catheter - Urethral (mL): 1050 mL    Stool (mL): 70 mL  Total OUT: 1140 mL  Total NET: -790 mL      LABS:                      8.4    5.22  )-----------( 184      ( 15 Oct 2021 06:39 )             26.4             10-15    144  |  111<H>  |  17  ----------------------------<  118<H>  4.5   |  29  |  0.99    Ca    8.6      15 Oct 2021 03:57  Phos  3.1     10-15  Mg     2.1     10-15    TPro  6.1  /  Alb  2.1<L>  /  TBili  0.6  /  DBili  x   /  AST  63<H>  /  ALT  126<H>  /  AlkPhos  139<H>  10-15

## 2021-10-15 NOTE — PROGRESS NOTE ADULT - PROBLEM SELECTOR PLAN 8
S/T ATN AND URINARY RETENTION - S/P IVF, NOW W/ MADDY, NEPHROLOGY CONSULT IN PROGRESS  - ON FLOMAX, ADDED FINASTERIDE

## 2021-10-15 NOTE — PROGRESS NOTE ADULT - SUBJECTIVE AND OBJECTIVE BOX
Podiatry Interval: 76 y.o male was evaluated bedside on floors for Right foot wound. Pt was verbal. Pt denies N/V/F/C/SOB. Pt admits to pain in the right foot. Pt had a wound vac on that was removed on 10/13.     Podiatry HPI: 76 year old male with a PMHx of DM, HTN, HLD, CAD to ED presents to the ED for a Right Foot s/p 5th foot partial ray resection and fillet toe flap. Patient states that he has sharp localized non-radiating pain to the Right foot 5th metatarsal. States that after his surgery, he did not see a podiatrist and he came into the ED because he saw drainage and was having pain. Denies any constitutional symptoms of N/V/C/F/SOB. Denies any other pedal complaints at this time. Denies calf pain today, bilaterally.    Medications apixaban 5 milliGRAM(s) Oral two times a day  aspirin  chewable 81 milliGRAM(s) Oral daily  atorvastatin 80 milliGRAM(s) Oral at bedtime  chlorhexidine 2% Cloths 1 Application(s) Topical <User Schedule>  collagenase Ointment 1 Application(s) Topical daily  cyanocobalamin 1000 MICROGram(s) Oral daily  ergocalciferol 97453 Unit(s) Oral <User Schedule>  ferrous    sulfate Liquid 300 milliGRAM(s) Enteral Tube daily  finasteride 5 milliGRAM(s) Oral daily  gabapentin 100 milliGRAM(s) Oral three times a day  insulin lispro (ADMELOG) corrective regimen sliding scale   SubCutaneous every 6 hours  meropenem  IVPB 1000 milliGRAM(s) IV Intermittent every 12 hours  metoprolol tartrate 25 milliGRAM(s) Oral two times a day  NIFEdipine XL 60 milliGRAM(s) Oral daily  ondansetron Injectable 4 milliGRAM(s) IV Push three times a day PRN  pantoprazole  Injectable 40 milliGRAM(s) IV Push at bedtime  sodium chloride 0.9% lock flush 10 milliLiter(s) IV Push every 1 hour PRN  vancomycin  IVPB 1250 milliGRAM(s) IV Intermittent daily    FHFamily history of acute myocardial infarction (Father)    Family history of diabetes mellitus (Mother, Sibling)    Family history of hypertension (Mother, Sibling)    Family history of hyperlipidemia (Mother, Sibling)    ,   PMHHTN (hypertension)    HLD (hyperlipidemia)    DM (diabetes mellitus)    CAD (coronary artery disease)    Glaucoma, angle-closure    [place] (hard of hearing)       PSHNo significant past surgical history    S/P CABG (coronary artery bypass graft)    History of thyroid surgery        Labs                          8.4    5.22  )-----------( 184      ( 15 Oct 2021 06:39 )             26.4      10-15    144  |  111<H>  |  17  ----------------------------<  118<H>  4.5   |  29  |  0.99    Ca    8.6      15 Oct 2021 03:57  Phos  3.1     10-15  Mg     2.1     10-15    TPro  6.1  /  Alb  2.1<L>  /  TBili  0.6  /  DBili  x   /  AST  63<H>  /  ALT  126<H>  /  AlkPhos  139<H>  10-15     Vital Signs Last 24 Hrs  T(C): 36.4 (15 Oct 2021 06:06), Max: 37.6 (14 Oct 2021 12:00)  T(F): 97.5 (15 Oct 2021 06:06), Max: 99.7 (14 Oct 2021 12:00)  HR: 83 (15 Oct 2021 06:06) (68 - 83)  BP: 150/77 (15 Oct 2021 06:06) (126/49 - 160/56)  BP(mean): 81 (14 Oct 2021 21:00) (68 - 84)  RR: 18 (15 Oct 2021 06:06) (16 - 24)  SpO2: 100% (15 Oct 2021 06:06) (98% - 100%)  Sedimentation Rate, Erythrocyte: 85 mm/Hr (10-08-21 @ 03:52)  Sedimentation Rate, Erythrocyte: 108 mm/Hr (10-02-21 @ 07:02)         C-Reactive Protein, Serum: 53 mg/L (10-08-21 @ 12:10)  C-Reactive Protein, Serum: 43 mg/L (10-02-21 @ 14:05)  C-Reactive Protein, Serum: 45 mg/L (09-16-21 @ 23:33)  C-Reactive Protein, Serum: 85 mg/L (08-22-21 @ 21:30)  C-Reactive Protein, Serum: 67 mg/L (08-15-21 @ 11:55)   WBC Count: 5.22 K/uL (10-15-21 @ 06:39)  WBC Count: 5.16 K/uL (10-15-21 @ 03:57)    ROS: All others negative unless otherwise stated in the HPI    PHYSICAL EXAM  LE Focused:    Vasc:  DP/PT pulses were faintly palpable, bilateral. DP/PT pulses were monophasic on doppler, bilaterally. CFT<3 seconds to all digits.   Derm: Surgical site with graft in place to R 5th ray, incorporating well, granular wound bed through the graft with patches of necrotic islands noted, minimal drainage or discharge. minimal erythema and edema noted, eschar forming over the wound. Dorsal foot wound noted with tendon exposed, DTI noted to b/l heel  Neuro: Protective sensation grossly diminished, b/l  MSK: 5/5 muscle strength noted to the pedal compartments. 5th digit amputation R foot        Imaging:    3 views right foot. Prior 8/20/2021.    Status post resection of the fifth toe and distal fifth metatarsal unchanged in appearance. No focal bone lysis or unusual periosteal reaction to suggest osteomyelitis. No acute fracture or dislocation. The remainder study unchanged. No soft tissue gas.    IMPRESSION: No acute finding. No plain film evidence of osteomyelitis.      MRI R foot:     INTERPRETATION:  Clinical Information: Recent fifth metatarsal head resection now with fifth digit pain, swelling and foul discharge.    Comparison: Radiographs of the right foot from 9/16/2021 and MRI the right foot from 8/16/2021.    Technique:  MRI of the right midfoot and forefoot.  Intravenous Contrast: None.    Findings:    There is a resection at the mid aspect of the fifth metatarsal shaft. There is a lateral soft tissue wound beginning at the level of the amputation which extends distally. There is susceptibility artifact in the region of the amputation consistent with postoperative change. There is hyperintense T2 marrow signal throughout the remaining fifth metatarsal and within the adjacent fourth metatarsal head which is nonspecific and could be related to recent postoperative changes although osteomyelitis is suspected.    There is edema and mild atrophy within the plantar muscles of the foot. There is minimal spurring at the first metatarsophalangeal and hallux sesamoid articulations.    Impression:  Resection at the mid aspect of the fifth metatarsal. Lateral soft tissue wound with marrow signal abnormality throughout the fifth metatarsal and within the adjacent fourth metatarsal head which is nonspecific in the setting of recent surgery although osteomyelitis is suspected.        Culture Results:     Rare Aeromonas hydrophila/caviae   Few Klebsiella oxytoca/Raoutella ornithinolytica   Few Enterococcus faecalis   Few Streptococcus agalactiae (Group B) isolated   Group B streptococci are susceptible to ampicillin,   penicillin and cefazolin, but may be resistant to   erythromycin and clindamycin.   Recommendations for intrapartum prophylaxis for Group B   streptococci are penicillin or ampicillin. (09.16.21 @ 21:42)     Gram Stain:   No polymorphonuclear cells seen per low power field   No organisms seen per oil power field (09.22.21 @ 13:54)       Historical Values  Gram Stain:   No polymorphonuclear cells seen per low power field   No organisms seen per oil power field (09.22.21 @ 13:54)   Gram Stain:   No polymorphonuclear leukocytes seen per low power field   No organisms seen per oil power field (08.20.21 @ 03:59)

## 2021-10-15 NOTE — PROGRESS NOTE ADULT - PROBLEM SELECTOR PLAN 9
CAD S/P CABG - PLACED ON ASA, STATIN, BB  - ECHO - SEVERE AORTIC STENOSIS, SEVERE CONCENTRIC LVH, G1DD, MILD [place]  - CARDIOLOGY CONSULT - DR. BASSETT   - MAY NEED ISCHEMIC EVAL ONCE ACUTE ILLNESS RESOLVES INCLUDING CARDIAC CATHETERIZATION  -SEVERE, ASYMPTOMATIC, STENOSIS - ONCE ACUTE ILLNESS RESOLVES, WILL LIKELY NEED ISCHEMIC WORKUP, SABIHA TO EVALUATE FURTHER

## 2021-10-15 NOTE — PROGRESS NOTE ADULT - SUBJECTIVE AND OBJECTIVE BOX
[facility] NEPHROLOGY- PROGRESS NOTE    Patient is a 75yo Male with DM on insulin, HTN, HLD, CAD, s/p R partial 5th ray amputation 8/19/2021 p/w R foot pain and foul drainage a/w diabetic foot ulcer suspicious for osteomyelitis. s/p OR debridement today. Nephrology consulted for abrupt rise in SCr.   s/p ibrahim placement for distended bladder on 9/23 with ~400ml of urine o/p  9/27- pt with SOB; CXR with pulmonary edema- pt given Lasix 80mg IV x1. Concern for aspiration PNA  Course BP: s/p lasix 60mg IV on 10/1 & 10/2 and s/p Lasix 40mg IV x1 today 10/4. Bladder scan with 1L urine; s/p ibrahim reinserted  Transferred to ICU for acute hypoxic respiratory failure, s/p intubated on 10/7, s/p extubated 10/13  s/p Rt pig tail removal today    Hospital Medications: Medications reviewed.  REVIEW OF SYSTEMS: Pt denies any SOB, chest pain, n/v/d, abd pain or LE edema    VITALS:  T(F): 97.5 (10-15-21 @ 06:06), Max: 99.5 (10-14-21 @ 16:00)  HR: 83 (10-15-21 @ 06:06)  BP: 150/77 (10-15-21 @ 06:06)  RR: 18 (10-15-21 @ 06:06)  SpO2: 100% (10-15-21 @ 06:06)  Wt(kg): --    10-14 @ 07:01  -  10-15 @ 07:00  --------------------------------------------------------  IN: 350 mL / OUT: 1140 mL / NET: -790 mL      PHYSICAL EXAM:  Gen: Not answering questions  Cards: RRR, +S1/S2, +TRINIDAD  Resp: mild rales at bases  GI: soft,   : +ibrahim  Extremities: no LE edema B/L  Derm: Rt foot wrapped     LABS:  10-15    144  |  111<H>  |  17  ----------------------------<  118<H>  4.5   |  29  |  0.99    Ca    8.6      15 Oct 2021 03:57  Phos  3.1     10-15  Mg     2.1     10-15    TPro  6.1  /  Alb  2.1<L>  /  TBili  0.6  /  DBili      /  AST  63<H>  /  ALT  126<H>  /  AlkPhos  139<H>  10-15    Creatinine Trend: 0.99 <--, 0.99 <--, 1.31 <--, 1.43 <--, 1.62 <--, 1.95 <--, 2.45 <--                        8.4    5.22  )-----------( 184      ( 15 Oct 2021 06:39 )             26.4     Urine Studies:

## 2021-10-15 NOTE — PROGRESS NOTE ADULT - ASSESSMENT
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 Pt with sharp pain on the R lateral foot. As per daughter, pt was taking tylenol which did not help his pain prompting the patient to ask his daughter to take him to the hospital. Pt is a poor historian. Daughter states that the patient did not see his podiatrist after the surgery.

## 2021-10-15 NOTE — PROGRESS NOTE ADULT - SUBJECTIVE AND OBJECTIVE BOX
C A R D I O L O G Y  **********************************    DATE OF SERVICE: 10-15-21    Patient denies chest pain breathing comfortable   Review of symptoms otherwise negative.    apixaban 5 milliGRAM(s) Oral two times a day  aspirin  chewable 81 milliGRAM(s) Oral daily  atorvastatin 80 milliGRAM(s) Oral at bedtime  chlorhexidine 2% Cloths 1 Application(s) Topical <User Schedule>  collagenase Ointment 1 Application(s) Topical daily  cyanocobalamin 1000 MICROGram(s) Oral daily  ergocalciferol 75324 Unit(s) Oral <User Schedule>  ferrous    sulfate Liquid 300 milliGRAM(s) Enteral Tube daily  finasteride 5 milliGRAM(s) Oral daily  gabapentin 100 milliGRAM(s) Oral three times a day  insulin lispro (ADMELOG) corrective regimen sliding scale   SubCutaneous every 6 hours  meropenem  IVPB 1000 milliGRAM(s) IV Intermittent every 12 hours  metoprolol tartrate 25 milliGRAM(s) Oral two times a day  NIFEdipine XL 60 milliGRAM(s) Oral daily  ondansetron Injectable 4 milliGRAM(s) IV Push three times a day PRN  pantoprazole  Injectable 40 milliGRAM(s) IV Push at bedtime  sodium chloride 0.9% lock flush 10 milliLiter(s) IV Push every 1 hour PRN  vancomycin  IVPB 1250 milliGRAM(s) IV Intermittent daily                            8.4    5.22  )-----------( 184      ( 15 Oct 2021 06:39 )             26.4       Hemoglobin: 8.4 g/dL (10-15 @ 06:39)  Hemoglobin: 8.2 g/dL (10-15 @ 03:57)  Hemoglobin: 8.3 g/dL (10-14 @ 03:44)  Hemoglobin: 8.6 g/dL (10-13 @ 04:58)  Hemoglobin: 8.1 g/dL (10-12 @ 12:13)      10-15    144  |  111<H>  |  17  ----------------------------<  118<H>  4.5   |  29  |  0.99    Ca    8.6      15 Oct 2021 03:57  Phos  3.1     10-15  Mg     2.1     10-15    TPro  6.1  /  Alb  2.1<L>  /  TBili  0.6  /  DBili  x   /  AST  63<H>  /  ALT  126<H>  /  AlkPhos  139<H>  10-15    Creatinine Trend: 0.99<--, 0.99<--, 1.31<--, 1.43<--, 1.62<--, 1.95<--    COAGS:           T(C): 36.4 (10-15-21 @ 06:06), Max: 37.6 (10-14-21 @ 12:00)  HR: 83 (10-15-21 @ 06:06) (68 - 83)  BP: 150/77 (10-15-21 @ 06:06) (126/49 - 160/56)  RR: 18 (10-15-21 @ 06:06) (16 - 24)  SpO2: 100% (10-15-21 @ 06:06) (98% - 100%)  Wt(kg): --    I&O's Summary    14 Oct 2021 07:01  -  15 Oct 2021 07:00  --------------------------------------------------------  IN: 350 mL / OUT: 1140 mL / NET: -790 mL        HEENT:  (-)icterus (-)pallor  CV: N S1 S2 1/6 TRINIDAD (+)2 Pulses B/l  Resp:  Coarse sounds B/L, normal effort  GI: (+) BS Soft, NT, ND  Lymph:  (-)Edema, (-)obvious lymphadenopathy  Skin: Warm to touch, Normal turgor  Psych: Unable to adequately  mood and affect    Tele: SR      ASSESSMENT/PLAN: 	76y  Male PMH DM on insulin, HTN, HLD, normal lV fx moderate to severe AS 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    - crt improved  - Abx per primary team  - Echo noted, Severe AS, EF 40-45%   - He will need a SABIHA and R+L heart cath when he recovers and has no active infection  - Cont medical management of CAD  - Pt. with new onset PAF now on Eliquis  - follow up thoracic surgery , s/p Pig tail  - cont supportive care     Zak Wilkins MD, [facility]  BEEPER [phone]

## 2021-10-15 NOTE — CHART NOTE - NSCHARTNOTEFT_GEN_A_CORE
Assessment:   76yMalePatient is a 76y old  Male who presents with a chief complaint of R Foot Pain (15 Oct 2021 17:05)      Factors impacting intake: [ ] none [ ] nausea  [ ] vomiting [ ] diarrhea [ ] constipation  [ ]chewing problems [ ] swallowing issues  [x ] other: unable to interview patient face to face as rule out covid-19. will not contact On extension in room  as confused. Per RN, patient with poor po intake in-house. seen by swallow On(10/14/21) and recommended dysphagia 1 puree diet and nectar liquids. diet changed To thin liquids. suggest swallow re-evaluation To determine adequacy of diet therapy . skin- coccyx-stage 2     Diet Presciption: Diet, Dysphagia 1 Pureed-Thin Liquids:   Consistent Carbohydrate {Evening Snacks}  DASH/TLC {Sodium & Cholesterol Restricted} (10-14-21 @ 21:08)    Intake:     Current Weight:   % Weight Change    Pertinent Medications: MEDICATIONS  (STANDING):  apixaban 5 milliGRAM(s) Oral two times a day  aspirin  chewable 81 milliGRAM(s) Oral daily  atorvastatin 80 milliGRAM(s) Oral at bedtime  chlorhexidine 2% Cloths 1 Application(s) Topical <User Schedule>  collagenase Ointment 1 Application(s) Topical daily  cyanocobalamin 1000 MICROGram(s) Oral daily  ergocalciferol 89300 Unit(s) Oral <User Schedule>  ferrous    sulfate Liquid 300 milliGRAM(s) Enteral Tube daily  finasteride 5 milliGRAM(s) Oral daily  gabapentin 100 milliGRAM(s) Oral three times a day  insulin lispro (ADMELOG) corrective regimen sliding scale   SubCutaneous every 6 hours  meropenem  IVPB 1000 milliGRAM(s) IV Intermittent every 12 hours  metoprolol tartrate 25 milliGRAM(s) Oral two times a day  NIFEdipine XL 60 milliGRAM(s) Oral daily  pantoprazole  Injectable 40 milliGRAM(s) IV Push at bedtime  vancomycin  IVPB 1250 milliGRAM(s) IV Intermittent daily    MEDICATIONS  (PRN):  ondansetron Injectable 4 milliGRAM(s) IV Push three times a day PRN Nausea and/or Vomiting  sodium chloride 0.9% lock flush 10 milliLiter(s) IV Push every 1 hour PRN Pre/post blood products, medications, blood draw, and to maintain line patency    Pertinent Labs: 10-15 Na144 mmol/L Glu 118 mg/dL<H> K+ 4.5 mmol/L Cr  0.99 mg/dL BUN 17 mg/dL 10-15 Phos 3.1 mg/dL 10-15 Alb 2.1 g/dL<L>     CAPILLARY BLOOD GLUCOSE      POCT Blood Glucose.: 128 mg/dL (15 Oct 2021 16:46)  POCT Blood Glucose.: 107 mg/dL (15 Oct 2021 11:10)  POCT Blood Glucose.: 106 mg/dL (15 Oct 2021 08:15)  POCT Blood Glucose.: 132 mg/dL (15 Oct 2021 00:31)    Skin:     Estimated Needs:   [ ] no change since previous assessment  [ ] recalculated:     Previous Nutrition Diagnosis:   [ ] Inadequate Energy Intake [ ]Inadequate Oral Intake [ ] Excessive Energy Intake   [ ] Underweight [ ] Increased Nutrient Needs [ ] Overweight/Obesity   [ ] Altered GI Function [ ] Unintended Weight Loss [ ] Food & Nutrition Related Knowledge Deficit [ ] Malnutrition     Nutrition Diagnosis is [ ] ongoing  [ ] resolved [ ] not applicable     New Nutrition Diagnosis: [ ] not applicable       Interventions:   Recommend  [ ] Change Diet To:  [ ] Nutrition Supplement  [ ] Nutrition Support  [ ] Other:     Monitoring and Evaluation:   [ ] PO intake [ x ] Tolerance to diet prescription [ x ] weights [ x ] labs[ x ] follow up per protocol  [ ] other:

## 2021-10-15 NOTE — PROGRESS NOTE ADULT - ASSESSMENT
75yo Male with Hx of HTN, HLD, CAD, s/p CABG, severe AS, PAD, s/p R partial 5th ray amputation (8/19/21) presented to [facility] ED on 9/16/21 with R foot pain and foul drainage a/w diabetic foot ulcer  (wound Cx grew Enterococcus, Aeromonas, Klebsiella, Group B Strep 9/16). He was found to have acute osteomyelitis,  s/p OR debridement, bone biopsy, wound vac on 9/22/21. Also noted OREN, and acute hypoxic respiratory failure due to pulmonary edema in setting of severe AS + aspirational PNA, as well as pleural effusions, required intubation on 10/7, became hemodynamically unstable, requiring pressor support 10/7 - 10/11.   Hepatology was consulted on 10/8 for acute, severe, hepatocellular liver injury, with intact function and cholestatic parameters, suspected likely due to ischemic injury, vs. DILI. The sudden acute rise and significant improvement (AST became half in a day), rathered support the ischemic injury.   Transaminases continued to improve: AST 63<-153<-137 <--1057,  <-227<- 252<--559. ALP has been elevated since 10/10, but improving 180-> 139, bilirubin remains normal.     - C/w close monitoring of LFTs, including INR  - Avoid hepatotoxic medications when medically feasible (fluconazole and levofloxacin was d/c on 10/8)  - C/w supportive measures per primary team  - C/w antibiotics per ID  - Can send Hep viral panel for screening    - Cholelithiasis and distended GB on prior US 9/28, was no evidence of acute cholecystitis, was seen by GI. Later developed mostly hepatocellular elevation with normal cholestatic parameters. Since 10/10 ALP slightly up-trended, but bilirubin remained normal. Now all improving.  - Consider repeat RUQ US if cholestatic parameters worsen.    - On AXR 10/6 concern joey for pneumoperitoneum -patient was too unstable for follow up imaging. Consider follow up imaging when feasible.    Thank you for the consult  Will continue to follow.  Hepatology returns Monday. Please, contact GI on call if any questions, concerns or change in status.   D/w primary team.

## 2021-10-15 NOTE — PROGRESS NOTE ADULT - ASSESSMENT
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  As per daughter, patient was taking tylenol which did not help his pain prompting the patient to ask his daughter to take him to the hospital. ON admission, he has no fever or Leukocytosis. The Xray of Right foot shows no Osteomyelitis but MRI of Right foot shows Lateral soft tissue wound with marrow signal abnormality throughout the fifth metatarsal which is consistent of Osteomyelitis. He has seen by Podiatry and wound culture has sent, Zosyn and Vancomycin has started. The ID consult requested to assist with further evaluation and antibiotic management.     # Right Fifth metatarsal DFU with drainage and Osteomyelitis- wound cx grew Enterococcus, Aeromonas and Klebsiella - Zosyn is the ideal to cover All organisms but kidney function is worsening, hence change to Unasyn and Levaquin until kidney function is improved - The pathology shows Bone with acute osteomyelitis and necrotic periosteal tissue.   # OREN- s/p urinary retention -s/p ibrahim catheter - s/p discontinue ACEI  # RLL pneumonia- most likely Aspiration, S/p Vomiting - On Unasyn  # Large bowel distension  # Candiduria- 9/22/21  # Pulmonary edema with bilateral moderate to large pleural effusions- on CT chest, 10/5/21- s/p intubation 10/7- s/p Right sided chest tube placement by CT team, 10/8/21  # COVID Exposure - PCR negative as of  10/11/21    would recommend:      1. Please discontinue Vancomycin since completed the course  2. Care of CT as per CT surgery team  3. Continue Meropenem to 1 g q 8hours based on crcl  4. Wound care as per Podiatry  5. Aspiration precaution      Attending Attestation:    Spent more than 35 minutes on total encounter, more than 50 % of the visit was spent counseling and/or coordinating care by the Attending physician.  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  As per daughter, patient was taking tylenol which did not help his pain prompting the patient to ask his daughter to take him to the hospital. ON admission, he has no fever or Leukocytosis. The Xray of Right foot shows no Osteomyelitis but MRI of Right foot shows Lateral soft tissue wound with marrow signal abnormality throughout the fifth metatarsal which is consistent of Osteomyelitis. He has seen by Podiatry and wound culture has sent, Zosyn and Vancomycin has started. The ID consult requested to assist with further evaluation and antibiotic management.     # Right Fifth metatarsal DFU with drainage and Osteomyelitis- wound cx grew Enterococcus, Aeromonas and Klebsiella - Zosyn is the ideal to cover All organisms but kidney function is worsening, hence change to Unasyn and Levaquin until kidney function is improved - The pathology shows Bone with acute osteomyelitis and necrotic periosteal tissue.   # OREN- s/p urinary retention -s/p ibrahim catheter - s/p discontinue ACEI  # RLL pneumonia- most likely Aspiration, S/p Vomiting - On Unasyn  # Large bowel distension  # Candiduria- 9/22/21  # Pulmonary edema with bilateral moderate to large pleural effusions- on CT chest, 10/5/21- s/p intubation 10/7- s/p Right sided chest tube placement by CT team, 10/8/21  # COVID Exposure - PCR negative as of  10/11/21    would recommend:    1. Please discontinue Vancomycin since completed the course  2. Monitor kidney function   3. Continue Meropenem to 1 g q 8hours based on crcl  4. Wound care as per Podiatry  5. Aspiration precaution    d/w Nursing  staff    Attending Attestation:    Spent more than 35 minutes on total encounter, more than 50 % of the visit was spent counseling and/or coordinating care by the Attending physician.

## 2021-10-15 NOTE — PROGRESS NOTE ADULT - SUBJECTIVE AND OBJECTIVE BOX
INTERVAL HPI/OVERNIGHT EVENTS:  No acute events, patient comfortable and in NAD.  Pigtail was removed this am by thoracic.     MEDICATIONS  (STANDING):  apixaban 5 milliGRAM(s) Oral two times a day  aspirin  chewable 81 milliGRAM(s) Oral daily  atorvastatin 80 milliGRAM(s) Oral at bedtime  chlorhexidine 2% Cloths 1 Application(s) Topical <User Schedule>  collagenase Ointment 1 Application(s) Topical daily  cyanocobalamin 1000 MICROGram(s) Oral daily  ergocalciferol 96079 Unit(s) Oral <User Schedule>  ferrous    sulfate Liquid 300 milliGRAM(s) Enteral Tube daily  finasteride 5 milliGRAM(s) Oral daily  gabapentin 100 milliGRAM(s) Oral three times a day  insulin lispro (ADMELOG) corrective regimen sliding scale   SubCutaneous every 6 hours  meropenem  IVPB 1000 milliGRAM(s) IV Intermittent every 12 hours  metoprolol tartrate 25 milliGRAM(s) Oral two times a day  NIFEdipine XL 60 milliGRAM(s) Oral daily  pantoprazole  Injectable 40 milliGRAM(s) IV Push at bedtime  vancomycin  IVPB 1250 milliGRAM(s) IV Intermittent daily    MEDICATIONS  (PRN):  ondansetron Injectable 4 milliGRAM(s) IV Push three times a day PRN Nausea and/or Vomiting  sodium chloride 0.9% lock flush 10 milliLiter(s) IV Push every 1 hour PRN Pre/post blood products, medications, blood draw, and to maintain line patency      Allergies    No Known Allergies    Vital Signs Last 24 Hrs  T(C): 36.6 (15 Oct 2021 13:59), Max: 37.5 (14 Oct 2021 20:15)  T(F): 97.8 (15 Oct 2021 13:59), Max: 99.5 (14 Oct 2021 20:15)  HR: 81 (15 Oct 2021 13:59) (68 - 83)  BP: 124/73 (15 Oct 2021 13:59) (124/73 - 160/56)  BP(mean): 81 (14 Oct 2021 21:00) (74 - 84)  RR: 20 (15 Oct 2021 13:59) (16 - 22)  SpO2: 100% (15 Oct 2021 13:59) (98% - 100%)    General: NAD  Cardiovascular: S1, S2  Respiratory: CTA  Chest tube: Removed this am by thoracic.     LABS:                        8.4    5.22  )-----------( 184      ( 15 Oct 2021 06:39 )             26.4     10-15    144  |  111<H>  |  17  ----------------------------<  118<H>  4.5   |  29  |  0.99    Ca    8.6      15 Oct 2021 03:57  Phos  3.1     10-15  Mg     2.1     10-15    TPro  6.1  /  Alb  2.1<L>  /  TBili  0.6  /  DBili  x   /  AST  63<H>  /  ALT  126<H>  /  AlkPhos  139<H>  10-15          RADIOLOGY & ADDITIONAL TESTS:  CXR today, no effusion, no pneumothorax, official report pending.

## 2021-10-15 NOTE — PROGRESS NOTE ADULT - SUBJECTIVE AND OBJECTIVE BOX
Patient is a 76y old  Male who presents with a chief complaint of R Foot Pain (15 Oct 2021 09:56)    PATIENT IS SEEN AND EXAMINED IN MEDICAL FLOOR.  KEENANT [    ]    MADDY [   ]      GT [   ]    ALLERGIES:  No Known Allergies      Daily     Daily     VITALS:    Vital Signs Last 24 Hrs  T(C): 36.4 (15 Oct 2021 06:06), Max: 37.5 (14 Oct 2021 16:00)  T(F): 97.5 (15 Oct 2021 06:06), Max: 99.5 (14 Oct 2021 16:00)  HR: 83 (15 Oct 2021 06:06) (68 - 83)  BP: 150/77 (15 Oct 2021 06:06) (126/49 - 160/56)  BP(mean): 81 (14 Oct 2021 21:00) (68 - 84)  RR: 18 (15 Oct 2021 06:06) (16 - 24)  SpO2: 100% (15 Oct 2021 06:06) (98% - 100%)    LABS:    CBC Full  -  ( 15 Oct 2021 06:39 )  WBC Count : 5.22 K/uL  RBC Count : 2.85 M/uL  Hemoglobin : 8.4 g/dL  Hematocrit : 26.4 %  Platelet Count - Automated : 184 K/uL  Mean Cell Volume : 92.6 fl  Mean Cell Hemoglobin : 29.5 pg  Mean Cell Hemoglobin Concentration : 31.8 gm/dL  Auto Neutrophil # : x  Auto Lymphocyte # : x  Auto Monocyte # : x  Auto Eosinophil # : x  Auto Basophil # : x  Auto Neutrophil % : x  Auto Lymphocyte % : x  Auto Monocyte % : x  Auto Eosinophil % : x  Auto Basophil % : x      10-15    144  |  111<H>  |  17  ----------------------------<  118<H>  4.5   |  29  |  0.99    Ca    8.6      15 Oct 2021 03:57  Phos  3.1     10-15  Mg     2.1     10-15    TPro  6.1  /  Alb  2.1<L>  /  TBili  0.6  /  DBili  x   /  AST  63<H>  /  ALT  126<H>  /  AlkPhos  139<H>  10-15    CAPILLARY BLOOD GLUCOSE      POCT Blood Glucose.: 107 mg/dL (15 Oct 2021 11:10)  POCT Blood Glucose.: 106 mg/dL (15 Oct 2021 08:15)  POCT Blood Glucose.: 132 mg/dL (15 Oct 2021 00:31)  POCT Blood Glucose.: 117 mg/dL (14 Oct 2021 17:16)        LIVER FUNCTIONS - ( 15 Oct 2021 03:57 )  Alb: 2.1 g/dL / Pro: 6.1 g/dL / ALK PHOS: 139 U/L / ALT: 126 U/L DA / AST: 63 U/L / GGT: x           Creatinine Trend: 0.99<--, 0.99<--, 1.31<--, 1.43<--, 1.62<--, 1.95<--  I&O's Summary    14 Oct 2021 07:01  -  15 Oct 2021 07:00  --------------------------------------------------------  IN: 350 mL / OUT: 1140 mL / NET: -790 mL            .Body Fluid Pleural Fluid  10-08 @ 18:51   Culture is being performed.  --  --      .Body Fluid Pleural Fluid  10-08 @ 18:34   No growth at 5 days  --    polymorphonuclear leukocytes seen  No organisms seen  by cytocentrifuge      .Tissue Right 5th toe r/o osteomyeltis  09-22 @ 13:54   No growth at 5 days  --    No polymorphonuclear cells seen per low power field  No organisms seen per oil power field      .Blood Blood-Venous  09-17 @ 10:28   No Growth Final  --  --      .Surgical Swab right foot wound  09-16 @ 21:42   Culture yields >4 types of aerobic and/or anaerobic bacteria  Call client services within 7 days if further workup is clinically  indicated. Culture includes  Rare Aeromonas hydrophila/caviae  Few Klebsiella oxytoca/Raoutella ornithinolytica  Few Enterococcus faecalis  Few Streptococcus agalactiae (Group B) isolated  Group B streptococci are susceptible to ampicillin,  penicillin and cefazolin, but may be resistant to  erythromycin and clindamycin.  Recommendations for intrapartum prophylaxis for Group B  streptococci are penicillin or ampicillin.  --  Aeromonas hydrophila/caviae  Klebsiella oxytoca /Raoutella ornithinolytica  Enterococcus faecalis      Bone R 5th metatarsal bone clean ma  08-20 @ 03:59   No growth at 5 days  --    No polymorphonuclear leukocytes seen per low power field  No organisms seen per oil power field      .Blood Blood-Venous  08-14 @ 21:09   No Growth Final  --  --      .Surgical Swab Right foot plantar wound  08-14 @ 21:08   Moderate Streptococcus agalactiae (Group B) isolated  Group B streptococci are susceptible to ampicillin,  penicillin and cefazolin, but may be resistant to  erythromycin and clindamycin.  Recommendations for intrapartum prophylaxis for Group B  streptococci are penicillin or ampicillin.  Moderate Bacteroides fragilis "Susceptibilities not performed"  Normal skin filiberto isolated  --  --          MEDICATIONS:    MEDICATIONS  (STANDING):  apixaban 5 milliGRAM(s) Oral two times a day  aspirin  chewable 81 milliGRAM(s) Oral daily  atorvastatin 80 milliGRAM(s) Oral at bedtime  chlorhexidine 2% Cloths 1 Application(s) Topical <User Schedule>  collagenase Ointment 1 Application(s) Topical daily  cyanocobalamin 1000 MICROGram(s) Oral daily  ergocalciferol 02759 Unit(s) Oral <User Schedule>  ferrous    sulfate Liquid 300 milliGRAM(s) Enteral Tube daily  finasteride 5 milliGRAM(s) Oral daily  gabapentin 100 milliGRAM(s) Oral three times a day  insulin lispro (ADMELOG) corrective regimen sliding scale   SubCutaneous every 6 hours  meropenem  IVPB 1000 milliGRAM(s) IV Intermittent every 12 hours  metoprolol tartrate 25 milliGRAM(s) Oral two times a day  NIFEdipine XL 60 milliGRAM(s) Oral daily  pantoprazole  Injectable 40 milliGRAM(s) IV Push at bedtime  vancomycin  IVPB 1250 milliGRAM(s) IV Intermittent daily      MEDICATIONS  (PRN):  ondansetron Injectable 4 milliGRAM(s) IV Push three times a day PRN Nausea and/or Vomiting  sodium chloride 0.9% lock flush 10 milliLiter(s) IV Push every 1 hour PRN Pre/post blood products, medications, blood draw, and to maintain line patency      REVIEW OF SYSTEMS:                           ALL ROS DONE [ X   ]    CONSTITUTIONAL:  LETHARGIC [   ], FEVER [   ], UNRESPONSIVE [   ]  CVS:  CP  [   ], SOB, [   ], PALPITATIONS [   ], DIZZYNESS [   ]  RS: COUGH [   ], SPUTUM [   ]  GI: ABDOMINAL PAIN [   ], NAUSEA [   ], VOMITINGS [   ], DIARRHEA [   ], CONSTIPATION [   ]  :  DYSURIA [   ], NOCTURIA [   ], INCREASED FREQUENCY [   ], DRIBLING [   ],  SKELETAL: PAINFUL JOINTS [   ], SWOLLEN JOINTS [   ], NECK ACHE [   ], LOW BACK ACHE [   ],  SKIN : ULCERS [   ], RASH [   ], ITCHING [   ]  CNS: HEAD ACHE [   ], DOUBLE VISION [   ], BLURRED VISION [   ], AMS / CONFUSION [   ], SEIZURES [   ], WEAKNESS [   ],TINGLING / NUMBNESS [   ]      PHYSICAL EXAMINATION:  GENERAL APPEARANCE: NO DISTRESS  HEENT:  NO PALLOR, NO  JVD,  NO   NODES, NECK SUPPLE  CVS: S1 +, S2 +,   RS: AEEB,  OCCASIONAL  RALES +,  RONCHI +, CRACKLES +     ABD: SOFT, NT, NO, BS +       EXT: NO PE  SKIN: WARM,   RIGHT FOOT WRAPPED W/ WOUND VAC IN PLACE   ,   CVC+  ,   CT + [RIGH] ATTACHED TO PLEUROVAC  SKELETAL:  ROM ACCEPTABLE  CNS:  AAO X  0    RADIOLOGY :    EXAM:  CT ABDOMEN AND PELVIS OC                            PROCEDURE DATE:  09/27/2021          INTERPRETATION:  CLINICAL INFORMATION: Small bowel obstruction.    COMPARISON: None.    CONTRAST/COMPLICATIONS:  IV Contrast: NONE  Oral Contrast: Omnipaque 300  Complications: None reported at time of study completion    PROCEDURE:  CT of the Abdomen and Pelvis was performed.  Sagittal and coronal reformats were performed.    FINDINGS:  LOWER CHEST: Small bilateral pleural effusions with compressiveatelectasis of both lower lobes.    LIVER: Within normal limits.  BILE DUCTS: Normal caliber.  GALLBLADDER: Distended. Small layering gallstones.  SPLEEN: Within normal limits.  PANCREAS: Within normal limits.  ADRENALS: Within normal limits.  KIDNEYS/URETERS: No hydronephrosis. Nonobstructing left upper pole calculus, measuring 0.6 cm.    BLADDER: Urinary bladder contains air and a Castañeda catheter balloon.  REPRODUCTIVE ORGANS: Prostate is not enlarged.    BOWEL: No bowel obstruction. Appendix isnormal. Colonic diverticulosis.  PERITONEUM: Mild presacral fluid.  VESSELS: Atherosclerotic changes.  RETROPERITONEUM/LYMPH NODES: No lymphadenopathy.  ABDOMINAL WALL: Within normal limits.  BONES: Degenerative changes. Sternotomy.    IMPRESSION:  No acute intra-abdominal pathology.    Small bilateral pleural effusions with compressive atelectasis of both lower lobes.    ====================================================    EXAM:  MR FOOT RT                            PROCEDURE DATE:  09/17/2021          INTERPRETATION:  Clinical Information: Recent fifth metatarsal head resection now with fifth digit pain, swelling and foul discharge.    Comparison: Radiographs of the right foot from 9/16/2021 and MRI the right foot from 8/16/2021.    Technique:  MRI of the right midfoot and forefoot.  Intravenous Contrast: None.    Findings:    There is a resection at the mid aspect of the fifth metatarsal shaft. There is a lateral soft tissue wound beginning at the level of the amputation which extends distally. There is susceptibility artifact in the region of the amputation consistent with postoperative change. There is hyperintense T2 marrow signal throughout the remaining fifth metatarsal and within the adjacent fourth metatarsal head which is nonspecific and could be related to recent postoperative changes although osteomyelitis is suspected.    There is edema and mild atrophy within the plantar muscles of the foot. There is minimal spurring at the first metatarsophalangeal and hallux sesamoid articulations.    Impression:  Resection at the mid aspect of the fifth metatarsal. Lateral soft tissue wound with marrow signal abnormality throughout the fifth metatarsal and within the adjacent fourth metatarsal head which is nonspecific in the setting of recent surgery although osteomyelitis is suspected.        ASSESSMENT :     Headache    HTN (hypertension)    HLD (hyperlipidemia)    DM (diabetes mellitus)    CAD (coronary artery disease)    Glaucoma, angle-closure    [place] (hard of hearing)    No significant past surgical history    S/P CABG (coronary artery bypass graft)    History of thyroid surgery        PLAN:  HPI:  76M from home, lives with daughter w/ PMH DM on insulin, HTN, HLD, CAD, 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 Pt with sharp pain on the R lateral foot. As per daughter, pt was taking tylenol which did not help his pain prompting the patient to ask his daughter to take him to the hospital. Pt is a poor historian. Daughter states that the patient did not see his podiatrist after the surgery. No fever, chest, pain, palpitations, nausea, vomiting, diarrhea (17 Sep 2021 01:11)    # TRANSFERRED TO ICU FOR WORSENING HYPOXIA AND DYSPNEA    # COVID EXPOSURE ON 10/13 - ON CONTACT AND DROPLET ISOLATION PRECAUTION; F/U COVID PCR    # SUSPECT RIGHT FOOT OSTEOMYELITIS S/P RIGHT 5TH RAY RESECTION [8/19] AND S/P DEBRIDEMENT, GRAFT APPLICATION, BONE BX AND WOUND VAC APPLICATION 9/22  ** RECENT ADMISSION FOR RIGHT DIABETIC FOOT ULCER, CELLULITIS, OSTEOMYELITIS RIGHT 5th METATARSAL S/P RIGHT 5TH PARTIAL RAY AMPUTATION [8/20] - BONE PATHOLOGY W/ CLEAN MARGINS    - S/P VANCOMYCIN AND ZOSYN ; NOW ON UNASYN AND LEVAQUIN GIVEN OREN; PER ID SWITCH TO ZOSYN UNTIL 11/3  - RECENTLY HAD SIMON W/ MILD PAD  - REVIEWED WOUND CX [ ENTEROCOCCUS, AEROMONAS, KLEBSIELLA] AND BCX  - BONE BX - acute osteomyelitis and necrotic periosteal tissue.   - ID CONSULT, PODIATRY CONSULT    - PICC LINE PLACED TO COMPLETE 6 WEEKS OF ANTIBIOTICS - PER ID SWITCH TO ZOSYN UNTIL 11/3    # ACUTE HYPOXIC RESPIRATORY FAILURE SUSPECT S/T PULMONARY EDEMA IN THE SETTING OF SEVERE AORTIC STENOSIS + ASPIRATION PNA; COMBINED SYSTOLIC + DIASTOLIC HF - S/P CT TUBE PLACEMENT [SET TO PLEUROVAC] - SWITCHED FROM LEVAQUIN TO VANCOMYCIN + MEROPENEM, LASIX, CARDIOLOGY CONSULT IN PROGRESS  - CRITICAL CARE CONSULT  - ASPIRATION EVENT WHEN PATIENT REMOVED NGT   - S/P LASIX ON 10/1 - 10/2, 10/4 AND 10/5  - CT CHEST W/ BILATERAL PLEURAL EFFUSIONS [10/5] - PULMONOLOGY CONSULT FOR POSSIBLE THORACENTESIS & WILL CONTINUE LASIX   - ALSO F/U REPEAT ECHOCARDIOGRAM  - CT SURGERY WAS CONSULTED BUT UNABLE TO PLACE PIGTAIL, THUS IR CONSULT IN PROGRESS FOR PIGTAIL PLACEMENT  - CHEST TUBE PLACED [10/8] - FLUID CONSISTENT WITH TRANSUDATIVE PROCESS  - NOTED REPEAT ECHO  - S/P EXTUBATION 10/13    # SHOCK - ? S/T HYPOVOLEMIA VS. SEPSIS - W/ CVC [SWITCHED FROM FEMORAL TO LEFT IJ], S/P VASOPRESSORS - SWITCHED TO VANCOMYCIN AND MEROPENEM  - F/U BCX  - ID CONSULT IN PROGRESS    # TRANSIENT NEW ONSET A.FIB, PAROXYSMAL - MONITORING ON CARDIAC MONITOR, ELIQUIS, CARDIOLOGY CONSULT IN PROGRESS    # FUNGURIA - D/C FLUCONAZOLE GIVEN TRANSAMINITIS    # TRANSAMINITIS - SUSPECT THIS IS ISCHEMIC HEPATITIS - IMPROVING - HEPATOLOGY CONSULT IN PROGRESS    # BOWEL DISTENTION - ? ILEUS - OPTIMIZING ELECTROLYTES - IMPROVED  - SURGERY CONSULT IN PROGRESS  - PASSED 2 BMS ON 9/27    # CHOLELITHIASIS - TRENDING LFTS, RUQ U/S - CHOLELITHIASIS, SURGERY CONSULT IN PROGRESS  - GI CONSULT IN PROGRESS - LESS SUSPICIOUS FOR CHOLECYSTITIS    # DYSPEPSIA - PLACED ON PPI BID WITH IMPROVEMENT, UNDERWENT ST EVAL     # ELEVATED TROPONINS - NSTEMI - ? DEMAND - WILL NEED ISCHEMIC EVAL ONCE ACUTE INFECTION RESOLVES PER CARDIOLOGY, CARDIOLOGY CONSULT IN PROGRESS  - ON ASA, STATIN, BB    # OREN ON CKD - S/T ATN AND URINARY RETENTION - S/P IVF, NOW W/ CASTAÑEDA, NEPHROLOGY CONUSLT IN PROGRESS  - ON FLOMAX, ADDED FINASTERIDE    - WITH WORSENING RENAL FXN - NOTED URINARY RETENTION [10/4] - REPLACED CASTAÑEDA    # MEDICAL CLEARANCE FOR SURGERY - RCRI - 2 - 10.1 % 30 DAY RISK OF DEATH, MI OR CARDIAC ARREST  - CARDIOLOGY CONSULT     # DM -  HBA1C - 11  - LANTUS, SSI + FS    # CAD S/P CABG - PLACED ON ASA, STATIN, BB  - ECHO - SEVERE AORTIC STENOSIS, SEVERE CONCENTRIC LVH, G1DD, MILD NM  - CARDIOLOGY CONSULT - DR. BASSETT   - MAY NEED ISCHEMIC EVAL ONCE ACUTE ILLNESS RESOLVES INCLUDING CARDIAC CATHETERIZATION    # HTN - ON METOPROLOL, NICARDIPINE    # SEVERE, ASYMPTOMATIC, STENOSIS - ONCE ACUTE ILLNESS RESOLVES, WILL LIKELY NEED ISCHEMIC WORKUP, SABIHA TO EVALUATE FURTHER. D/W PATIENT, DAUGHTER AND CARDIOLOGY TEAM [PREVIOUSLY SAW DR. BASSETT]    # VITAMIN D DEFICIENCY - ON CHOLECALCIFEROL    # HLD - STATIN    # CASE DISCUSSED AT LENGTH WITH PATIENT AND DAUGHTER, BIRDIESIMIN ROBERT AT BEDSIDE AND VIA PHONE @ [phone] [10/5] - CASE DICUSSED AT LENGTH AND ALL QUESTIONS WERE ANSWERED. DAUGHTER UNDERSTOOD THAT PROGNOSIS IS GUARDED.  # PATIENT AND DAUGHTER REFUSED [facility] ON PREVIOUS ADMISSION, PREVIOUSLY WISHED FOR PATIENT RETURN HOME W/ HOME PT; HOWEVER NOW, DAUGHTER WISHES FOR PATIENT TO GO TO [facility], AS PATIENT'S WIFE IS CURRENTLY ADMITTED THERE    # GI AND DVT PPX   Patient is a 76y old  Male who presents with a chief complaint of R Foot Pain (15 Oct 2021 09:56)    PATIENT IS SEEN AND EXAMINED IN MEDICAL FLOOR.    ALLERGIES:  No Known Allergies      VITALS:    Vital Signs Last 24 Hrs  T(C): 36.4 (15 Oct 2021 06:06), Max: 37.5 (14 Oct 2021 16:00)  T(F): 97.5 (15 Oct 2021 06:06), Max: 99.5 (14 Oct 2021 16:00)  HR: 83 (15 Oct 2021 06:06) (68 - 83)  BP: 150/77 (15 Oct 2021 06:06) (126/49 - 160/56)  BP(mean): 81 (14 Oct 2021 21:00) (68 - 84)  RR: 18 (15 Oct 2021 06:06) (16 - 24)  SpO2: 100% (15 Oct 2021 06:06) (98% - 100%)    LABS:    CBC Full  -  ( 15 Oct 2021 06:39 )  WBC Count : 5.22 K/uL  RBC Count : 2.85 M/uL  Hemoglobin : 8.4 g/dL  Hematocrit : 26.4 %  Platelet Count - Automated : 184 K/uL  Mean Cell Volume : 92.6 fl  Mean Cell Hemoglobin : 29.5 pg  Mean Cell Hemoglobin Concentration : 31.8 gm/dL  Auto Neutrophil # : x  Auto Lymphocyte # : x  Auto Monocyte # : x  Auto Eosinophil # : x  Auto Basophil # : x  Auto Neutrophil % : x  Auto Lymphocyte % : x  Auto Monocyte % : x  Auto Eosinophil % : x  Auto Basophil % : x      10-15    144  |  111<H>  |  17  ----------------------------<  118<H>  4.5   |  29  |  0.99    Ca    8.6      15 Oct 2021 03:57  Phos  3.1     10-15  Mg     2.1     10-15    TPro  6.1  /  Alb  2.1<L>  /  TBili  0.6  /  DBili  x   /  AST  63<H>  /  ALT  126<H>  /  AlkPhos  139<H>  10-15    CAPILLARY BLOOD GLUCOSE      POCT Blood Glucose.: 107 mg/dL (15 Oct 2021 11:10)  POCT Blood Glucose.: 106 mg/dL (15 Oct 2021 08:15)  POCT Blood Glucose.: 132 mg/dL (15 Oct 2021 00:31)  POCT Blood Glucose.: 117 mg/dL (14 Oct 2021 17:16)        LIVER FUNCTIONS - ( 15 Oct 2021 03:57 )  Alb: 2.1 g/dL / Pro: 6.1 g/dL / ALK PHOS: 139 U/L / ALT: 126 U/L DA / AST: 63 U/L / GGT: x           Creatinine Trend: 0.99<--, 0.99<--, 1.31<--, 1.43<--, 1.62<--, 1.95<--  I&O's Summary    14 Oct 2021 07:01  -  15 Oct 2021 07:00  --------------------------------------------------------  IN: 350 mL / OUT: 1140 mL / NET: -790 mL            .Body Fluid Pleural Fluid  10-08 @ 18:51   Culture is being performed.  --  --      .Body Fluid Pleural Fluid  10-08 @ 18:34   No growth at 5 days  --    polymorphonuclear leukocytes seen  No organisms seen  by cytocentrifuge      .Tissue Right 5th toe r/o osteomyeltis  09-22 @ 13:54   No growth at 5 days  --    No polymorphonuclear cells seen per low power field  No organisms seen per oil power field      .Blood Blood-Venous  09-17 @ 10:28   No Growth Final  --  --      .Surgical Swab right foot wound  09-16 @ 21:42   Culture yields >4 types of aerobic and/or anaerobic bacteria  Call client services within 7 days if further workup is clinically  indicated. Culture includes  Rare Aeromonas hydrophila/caviae  Few Klebsiella oxytoca/Raoutella ornithinolytica  Few Enterococcus faecalis  Few Streptococcus agalactiae (Group B) isolated  Group B streptococci are susceptible to ampicillin,  penicillin and cefazolin, but may be resistant to  erythromycin and clindamycin.  Recommendations for intrapartum prophylaxis for Group B  streptococci are penicillin or ampicillin.  --  Aeromonas hydrophila/caviae  Klebsiella oxytoca /Raoutella ornithinolytica  Enterococcus faecalis      Bone R 5th metatarsal bone clean ma  08-20 @ 03:59   No growth at 5 days  --    No polymorphonuclear leukocytes seen per low power field  No organisms seen per oil power field      .Blood Blood-Venous  08-14 @ 21:09   No Growth Final  --  --      .Surgical Swab Right foot plantar wound  08-14 @ 21:08   Moderate Streptococcus agalactiae (Group B) isolated  Group B streptococci are susceptible to ampicillin,  penicillin and cefazolin, but may be resistant to  erythromycin and clindamycin.  Recommendations for intrapartum prophylaxis for Group B  streptococci are penicillin or ampicillin.  Moderate Bacteroides fragilis "Susceptibilities not performed"  Normal skin filiberto isolated  --  --          MEDICATIONS:    MEDICATIONS  (STANDING):  apixaban 5 milliGRAM(s) Oral two times a day  aspirin  chewable 81 milliGRAM(s) Oral daily  atorvastatin 80 milliGRAM(s) Oral at bedtime  chlorhexidine 2% Cloths 1 Application(s) Topical <User Schedule>  collagenase Ointment 1 Application(s) Topical daily  cyanocobalamin 1000 MICROGram(s) Oral daily  ergocalciferol 93699 Unit(s) Oral <User Schedule>  ferrous    sulfate Liquid 300 milliGRAM(s) Enteral Tube daily  finasteride 5 milliGRAM(s) Oral daily  gabapentin 100 milliGRAM(s) Oral three times a day  insulin lispro (ADMELOG) corrective regimen sliding scale   SubCutaneous every 6 hours  meropenem  IVPB 1000 milliGRAM(s) IV Intermittent every 12 hours  metoprolol tartrate 25 milliGRAM(s) Oral two times a day  NIFEdipine XL 60 milliGRAM(s) Oral daily  pantoprazole  Injectable 40 milliGRAM(s) IV Push at bedtime  vancomycin  IVPB 1250 milliGRAM(s) IV Intermittent daily      MEDICATIONS  (PRN):  ondansetron Injectable 4 milliGRAM(s) IV Push three times a day PRN Nausea and/or Vomiting  sodium chloride 0.9% lock flush 10 milliLiter(s) IV Push every 1 hour PRN Pre/post blood products, medications, blood draw, and to maintain line patency      REVIEW OF SYSTEMS:                           ALL ROS DONE [ X   ]    CONSTITUTIONAL:  LETHARGIC [   ], FEVER [   ], UNRESPONSIVE [   ]  CVS:  CP  [   ], SOB, [   ], PALPITATIONS [   ], DIZZYNESS [   ]  RS: COUGH [   ], SPUTUM [   ]  GI: ABDOMINAL PAIN [   ], NAUSEA [   ], VOMITINGS [   ], DIARRHEA [   ], CONSTIPATION [   ]  :  DYSURIA [   ], NOCTURIA [   ], INCREASED FREQUENCY [   ], DRIBLING [   ],  SKELETAL: PAINFUL JOINTS [   ], SWOLLEN JOINTS [   ], NECK ACHE [   ], LOW BACK ACHE [   ],  SKIN : ULCERS [   ], RASH [   ], ITCHING [   ]  CNS: HEAD ACHE [   ], DOUBLE VISION [   ], BLURRED VISION [   ], AMS / CONFUSION [   ], SEIZURES [   ], WEAKNESS [   ],TINGLING / NUMBNESS [   ]      PHYSICAL EXAMINATION:  GENERAL APPEARANCE: NO DISTRESS  HEENT:  NO PALLOR, NO  JVD,  NO   NODES, NECK SUPPLE  CVS: S1 +, S2 +,   RS: AEEB,  OCCASIONAL  RALES +,  RONCHI +, CRACKLES +     ABD: SOFT, NT, NO, BS +       EXT: NO PE  SKIN: WARM,   RIGHT FOOT WRAPPED, RIGHT CHEST TUBE SITE W/ BANDAGES  SKELETAL:  ROM ACCEPTABLE  CNS:  AAO X  2-3    RADIOLOGY :    EXAM:  CT ABDOMEN AND PELVIS OC                            PROCEDURE DATE:  09/27/2021          INTERPRETATION:  CLINICAL INFORMATION: Small bowel obstruction.    COMPARISON: None.    CONTRAST/COMPLICATIONS:  IV Contrast: NONE  Oral Contrast: Omnipaque 300  Complications: None reported at time of study completion    PROCEDURE:  CT of the Abdomen and Pelvis was performed.  Sagittal and coronal reformats were performed.    FINDINGS:  LOWER CHEST: Small bilateral pleural effusions with compressiveatelectasis of both lower lobes.    LIVER: Within normal limits.  BILE DUCTS: Normal caliber.  GALLBLADDER: Distended. Small layering gallstones.  SPLEEN: Within normal limits.  PANCREAS: Within normal limits.  ADRENALS: Within normal limits.  KIDNEYS/URETERS: No hydronephrosis. Nonobstructing left upper pole calculus, measuring 0.6 cm.    BLADDER: Urinary bladder contains air and a Castañeda catheter balloon.  REPRODUCTIVE ORGANS: Prostate is not enlarged.    BOWEL: No bowel obstruction. Appendix isnormal. Colonic diverticulosis.  PERITONEUM: Mild presacral fluid.  VESSELS: Atherosclerotic changes.  RETROPERITONEUM/LYMPH NODES: No lymphadenopathy.  ABDOMINAL WALL: Within normal limits.  BONES: Degenerative changes. Sternotomy.    IMPRESSION:  No acute intra-abdominal pathology.    Small bilateral pleural effusions with compressive atelectasis of both lower lobes.    ====================================================    EXAM:  MR FOOT RT                            PROCEDURE DATE:  09/17/2021          INTERPRETATION:  Clinical Information: Recent fifth metatarsal head resection now with fifth digit pain, swelling and foul discharge.    Comparison: Radiographs of the right foot from 9/16/2021 and MRI the right foot from 8/16/2021.    Technique:  MRI of the right midfoot and forefoot.  Intravenous Contrast: None.    Findings:    There is a resection at the mid aspect of the fifth metatarsal shaft. There is a lateral soft tissue wound beginning at the level of the amputation which extends distally. There is susceptibility artifact in the region of the amputation consistent with postoperative change. There is hyperintense T2 marrow signal throughout the remaining fifth metatarsal and within the adjacent fourth metatarsal head which is nonspecific and could be related to recent postoperative changes although osteomyelitis is suspected.    There is edema and mild atrophy within the plantar muscles of the foot. There is minimal spurring at the first metatarsophalangeal and hallux sesamoid articulations.    Impression:  Resection at the mid aspect of the fifth metatarsal. Lateral soft tissue wound with marrow signal abnormality throughout the fifth metatarsal and within the adjacent fourth metatarsal head which is nonspecific in the setting of recent surgery although osteomyelitis is suspected.        ASSESSMENT :     Headache    HTN (hypertension)    HLD (hyperlipidemia)    DM (diabetes mellitus)    CAD (coronary artery disease)    Glaucoma, angle-closure    [place] (hard of hearing)    No significant past surgical history    S/P CABG (coronary artery bypass graft)    History of thyroid surgery        PLAN:  HPI:  76M from home, lives with daughter w/ PMH DM on insulin, HTN, HLD, CAD, 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 Pt with sharp pain on the R lateral foot. As per daughter, pt was taking tylenol which did not help his pain prompting the patient to ask his daughter to take him to the hospital. Pt is a poor historian. Daughter states that the patient did not see his podiatrist after the surgery. No fever, chest, pain, palpitations, nausea, vomiting, diarrhea (17 Sep 2021 01:11)    # TRANSFERRED TO ICU FOR WORSENING HYPOXIA AND DYSPNEA    # COVID EXPOSURE ON 10/13 - ON CONTACT AND DROPLET ISOLATION PRECAUTION; F/U COVID PCR    # SUSPECT RIGHT FOOT OSTEOMYELITIS S/P RIGHT 5TH RAY RESECTION [8/19] AND S/P DEBRIDEMENT, GRAFT APPLICATION, BONE BX AND WOUND VAC APPLICATION 9/22 - BONE BX W/ ACUTE OSTEOMYELITIS AND NECROTIC PERIOSTEAL TISSUE  ** RECENT ADMISSION FOR RIGHT DIABETIC FOOT ULCER, CELLULITIS, OSTEOMYELITIS RIGHT 5th METATARSAL S/P RIGHT 5TH PARTIAL RAY AMPUTATION [8/20] - BONE PATHOLOGY W/ CLEAN MARGINS    - S/P VANCOMYCIN AND ZOSYN ; NOW ON UNASYN AND LEVAQUIN GIVEN OREN; PER ID SWITCH TO ZOSYN UNTIL 11/3 [NOW ON VANC + MEROPENEM]  - RECENTLY HAD SIMON W/ MILD PAD  - REVIEWED WOUND CX [ ENTEROCOCCUS, AEROMONAS, KLEBSIELLA] AND BCX  - ID CONSULT, PODIATRY CONSULT    - PICC LINE PLACED TO COMPLETE 6 WEEKS OF ANTIBIOTICS - PER ID SWITCH TO ZOSYN UNTIL 11/3 ON D/C    # ACUTE HYPOXIC RESPIRATORY FAILURE SUSPECT S/T PULMONARY EDEMA IN THE SETTING OF SEVERE AORTIC STENOSIS + ASPIRATION PNA; COMBINED SYSTOLIC + DIASTOLIC HF - S/P CT TUBE PLACEMENT [SET TO PLEUROVAC] - SWITCHED FROM LEVAQUIN TO VANCOMYCIN + MEROPENEM, LASIX, CARDIOLOGY CONSULT IN PROGRESS  - CRITICAL CARE CONSULT  - ASPIRATION EVENT WHEN PATIENT REMOVED NGT   - S/P LASIX ON 10/1 - 10/2, 10/4 AND 10/5  - CT CHEST W/ BILATERAL PLEURAL EFFUSIONS [10/5] - PULMONOLOGY CONSULT FOR POSSIBLE THORACENTESIS & WILL CONTINUE LASIX   - ALSO F/U REPEAT ECHOCARDIOGRAM  - CT SURGERY WAS CONSULTED BUT UNABLE TO PLACE PIGTAIL, THUS IR CONSULT IN PROGRESS FOR PIGTAIL PLACEMENT  - CHEST TUBE PLACED [10/8] - FLUID CONSISTENT WITH TRANSUDATIVE PROCESS  - NOTED REPEAT ECHO  - S/P EXTUBATION 10/13  - S/P CHEST TUBE REMOVAL 10/15  - F/U 10/16 A.M. CXR    # SHOCK - ? S/T HYPOVOLEMIA VS. SEPSIS - S/P CVC [SWITCHED FROM FEMORAL TO LEFT IJ], S/P VASOPRESSORS - SWITCHED TO VANCOMYCIN AND MEROPENEM  - BCX - NGTD  - ID CONSULT IN PROGRESS    # TRANSIENT NEW ONSET A.FIB, PAROXYSMAL - ON ELIQUIS, CARDIOLOGY CONSULT IN PROGRESS    # FUNGURIA - D/C FLUCONAZOLE GIVEN TRANSAMINITIS    # TRANSAMINITIS - SUSPECT THIS IS ISCHEMIC HEPATITIS - IMPROVING - HEPATOLOGY CONSULT IN PROGRESS    # BOWEL DISTENTION - ? ILEUS - OPTIMIZING ELECTROLYTES - IMPROVED  - SURGERY CONSULT IN PROGRESS    # CHOLELITHIASIS - TRENDING LFTS, RUQ U/S - CHOLELITHIASIS, SURGERY CONSULT IN PROGRESS  - GI CONSULT IN PROGRESS - LESS SUSPICIOUS FOR CHOLECYSTITIS    # DYSPEPSIA - PLACED ON PPI BID WITH IMPROVEMENT, UNDERWENT ST EVAL     # ELEVATED TROPONINS - NSTEMI - ? DEMAND - WILL NEED ISCHEMIC EVAL ONCE ACUTE INFECTION RESOLVES PER CARDIOLOGY, CARDIOLOGY CONSULT IN PROGRESS  - ON ASA, STATIN, BB    # OREN ON CKD - S/T ATN AND URINARY RETENTION - S/P IVF, NOW W/ CASTAÑEDA, NEPHROLOGY CONUSLT IN PROGRESS  - ON FLOMAX, AND FINASTERIDE  - FAILED TOV WITH WORSENING RENAL FXN - NOTED URINARY RETENTION [10/4] - REPLACED CASTAÑEDA    # MEDICAL CLEARANCE FOR SURGERY - RCRI - 2 - 10.1 % 30 DAY RISK OF DEATH, MI OR CARDIAC ARREST  - CARDIOLOGY CONSULT     # DM -  HBA1C - 11  - LANTUS, SSI + FS    # CAD S/P CABG - PLACED ON ASA, STATIN, BB  - ECHO - SEVERE AORTIC STENOSIS, SEVERE CONCENTRIC LVH, G1DD, MILD ID  - CARDIOLOGY CONSULT - DR. BASSETT   - MAY NEED ISCHEMIC EVAL ONCE ACUTE ILLNESS RESOLVES INCLUDING CARDIAC CATHETERIZATION    # HTN - ON METOPROLOL, NICARDIPINE    # SEVERE, ASYMPTOMATIC, STENOSIS - ONCE ACUTE ILLNESS RESOLVES, WILL LIKELY NEED ISCHEMIC WORKUP, SABIHA TO EVALUATE FURTHER. D/W PATIENT, DAUGHTER AND CARDIOLOGY TEAM [PREVIOUSLY SAW DR. BASSETT]    # VITAMIN D DEFICIENCY - ON CHOLECALCIFEROL    # HLD - STATIN    # CASE DISCUSSED AT LENGTH WITH PATIENT AND DAUGHTER, BIRDIE YESICA AT BEDSIDE AND VIA PHONE @ [phone] [10/5] - CASE DICUSSED AT LENGTH AND ALL QUESTIONS WERE ANSWERED. DAUGHTER UNDERSTOOD THAT PROGNOSIS IS GUARDED.  # PATIENT AND DAUGHTER REFUSED [facility] ON PREVIOUS ADMISSION, PREVIOUSLY WISHED FOR PATIENT RETURN HOME W/ HOME PT; HOWEVER NOW, DAUGHTER WISHES FOR PATIENT TO GO TO [facility] [place], AS PATIENT'S WIFE IS CURRENTLY ADMITTED THERE    # GI AND DVT PPX

## 2021-10-16 LAB
ALBUMIN SERPL ELPH-MCNC: 2.1 G/DL — LOW (ref 3.5–5)
ALP SERPL-CCNC: 134 U/L — HIGH (ref 40–120)
ALT FLD-CCNC: 94 U/L DA — HIGH (ref 10–60)
ANION GAP SERPL CALC-SCNC: 5 MMOL/L — SIGNIFICANT CHANGE UP (ref 5–17)
AST SERPL-CCNC: 44 U/L — HIGH (ref 10–40)
BILIRUB SERPL-MCNC: 0.7 MG/DL — SIGNIFICANT CHANGE UP (ref 0.2–1.2)
BUN SERPL-MCNC: 16 MG/DL — SIGNIFICANT CHANGE UP (ref 7–18)
CALCIUM SERPL-MCNC: 8.7 MG/DL — SIGNIFICANT CHANGE UP (ref 8.4–10.5)
CHLORIDE SERPL-SCNC: 108 MMOL/L — SIGNIFICANT CHANGE UP (ref 96–108)
CO2 SERPL-SCNC: 27 MMOL/L — SIGNIFICANT CHANGE UP (ref 22–31)
CREAT SERPL-MCNC: 0.97 MG/DL — SIGNIFICANT CHANGE UP (ref 0.5–1.3)
GLUCOSE BLDC GLUCOMTR-MCNC: 101 MG/DL — HIGH (ref 70–99)
GLUCOSE BLDC GLUCOMTR-MCNC: 102 MG/DL — HIGH (ref 70–99)
GLUCOSE BLDC GLUCOMTR-MCNC: 104 MG/DL — HIGH (ref 70–99)
GLUCOSE BLDC GLUCOMTR-MCNC: 105 MG/DL — HIGH (ref 70–99)
GLUCOSE BLDC GLUCOMTR-MCNC: 94 MG/DL — SIGNIFICANT CHANGE UP (ref 70–99)
GLUCOSE SERPL-MCNC: 103 MG/DL — HIGH (ref 70–99)
HCT VFR BLD CALC: 26.6 % — LOW (ref 39–50)
HCT VFR BLD CALC: 26.7 % — LOW (ref 39–50)
HGB BLD-MCNC: 8.4 G/DL — LOW (ref 13–17)
HGB BLD-MCNC: 8.7 G/DL — LOW (ref 13–17)
MAGNESIUM SERPL-MCNC: 2.1 MG/DL — SIGNIFICANT CHANGE UP (ref 1.6–2.6)
MCHC RBC-ENTMCNC: 29.1 PG — SIGNIFICANT CHANGE UP (ref 27–34)
MCHC RBC-ENTMCNC: 30 PG — SIGNIFICANT CHANGE UP (ref 27–34)
MCHC RBC-ENTMCNC: 31.6 GM/DL — LOW (ref 32–36)
MCHC RBC-ENTMCNC: 32.6 GM/DL — SIGNIFICANT CHANGE UP (ref 32–36)
MCV RBC AUTO: 92 FL — SIGNIFICANT CHANGE UP (ref 80–100)
MCV RBC AUTO: 92.1 FL — SIGNIFICANT CHANGE UP (ref 80–100)
NRBC # BLD: 0 /100 WBCS — SIGNIFICANT CHANGE UP (ref 0–0)
NRBC # BLD: 0 /100 WBCS — SIGNIFICANT CHANGE UP (ref 0–0)
PHOSPHATE SERPL-MCNC: 3.2 MG/DL — SIGNIFICANT CHANGE UP (ref 2.5–4.5)
PLATELET # BLD AUTO: 183 K/UL — SIGNIFICANT CHANGE UP (ref 150–400)
PLATELET # BLD AUTO: 187 K/UL — SIGNIFICANT CHANGE UP (ref 150–400)
POTASSIUM SERPL-MCNC: 4.2 MMOL/L — SIGNIFICANT CHANGE UP (ref 3.5–5.3)
POTASSIUM SERPL-SCNC: 4.2 MMOL/L — SIGNIFICANT CHANGE UP (ref 3.5–5.3)
PROT SERPL-MCNC: 6.3 G/DL — SIGNIFICANT CHANGE UP (ref 6–8.3)
RBC # BLD: 2.89 M/UL — LOW (ref 4.2–5.8)
RBC # BLD: 2.9 M/UL — LOW (ref 4.2–5.8)
RBC # FLD: 14.6 % — HIGH (ref 10.3–14.5)
RBC # FLD: 14.6 % — HIGH (ref 10.3–14.5)
SODIUM SERPL-SCNC: 140 MMOL/L — SIGNIFICANT CHANGE UP (ref 135–145)
WBC # BLD: 4.37 K/UL — SIGNIFICANT CHANGE UP (ref 3.8–10.5)
WBC # BLD: 4.46 K/UL — SIGNIFICANT CHANGE UP (ref 3.8–10.5)
WBC # FLD AUTO: 4.37 K/UL — SIGNIFICANT CHANGE UP (ref 3.8–10.5)
WBC # FLD AUTO: 4.46 K/UL — SIGNIFICANT CHANGE UP (ref 3.8–10.5)

## 2021-10-16 PROCEDURE — 74018 RADEX ABDOMEN 1 VIEW: CPT | Mod: 26

## 2021-10-16 RX ADMIN — CHLORHEXIDINE GLUCONATE 1 APPLICATION(S): 213 SOLUTION TOPICAL at 05:16

## 2021-10-16 RX ADMIN — Medication 25 MILLIGRAM(S): at 05:17

## 2021-10-16 RX ADMIN — MEROPENEM 100 MILLIGRAM(S): 1 INJECTION INTRAVENOUS at 17:46

## 2021-10-16 RX ADMIN — Medication 60 MILLIGRAM(S): at 05:17

## 2021-10-16 RX ADMIN — GABAPENTIN 100 MILLIGRAM(S): 400 CAPSULE ORAL at 05:16

## 2021-10-16 RX ADMIN — ATORVASTATIN CALCIUM 80 MILLIGRAM(S): 80 TABLET, FILM COATED ORAL at 22:16

## 2021-10-16 RX ADMIN — GABAPENTIN 100 MILLIGRAM(S): 400 CAPSULE ORAL at 22:16

## 2021-10-16 RX ADMIN — Medication 300 MILLIGRAM(S): at 11:31

## 2021-10-16 RX ADMIN — Medication 250 MILLIGRAM(S): at 11:43

## 2021-10-16 RX ADMIN — GABAPENTIN 100 MILLIGRAM(S): 400 CAPSULE ORAL at 14:42

## 2021-10-16 RX ADMIN — Medication 81 MILLIGRAM(S): at 11:31

## 2021-10-16 RX ADMIN — MEROPENEM 100 MILLIGRAM(S): 1 INJECTION INTRAVENOUS at 05:16

## 2021-10-16 RX ADMIN — Medication 1 APPLICATION(S): at 11:30

## 2021-10-16 RX ADMIN — Medication 25 MILLIGRAM(S): at 17:49

## 2021-10-16 RX ADMIN — FINASTERIDE 5 MILLIGRAM(S): 5 TABLET, FILM COATED ORAL at 11:31

## 2021-10-16 RX ADMIN — PANTOPRAZOLE SODIUM 40 MILLIGRAM(S): 20 TABLET, DELAYED RELEASE ORAL at 22:16

## 2021-10-16 RX ADMIN — APIXABAN 5 MILLIGRAM(S): 2.5 TABLET, FILM COATED ORAL at 17:49

## 2021-10-16 RX ADMIN — APIXABAN 5 MILLIGRAM(S): 2.5 TABLET, FILM COATED ORAL at 05:15

## 2021-10-16 RX ADMIN — PREGABALIN 1000 MICROGRAM(S): 225 CAPSULE ORAL at 11:31

## 2021-10-16 NOTE — PROGRESS NOTE ADULT - ASSESSMENT
CARDIOLOGY ATTENDING    Agree with above. Continue lifelong a/c for PAF. Will need SABIHA and R/L heart cath for severe AS after acute ID issues resolved.

## 2021-10-16 NOTE — PROGRESS NOTE ADULT - ASSESSMENT
76M with b/l Pleural effusion, right greater than left, s/p R pigtail placement 10/8  O2 Sat 100% on RA    -S/p removal of pigtail 10/16; Post pull CXR reviewed  -No further thoracic surgery intervention warranted at this time  -Reconsult PRN

## 2021-10-16 NOTE — ADVANCED PRACTICE NURSE CONSULT - ASSESSMENT
This is a 76yr old male patient admitted for Headache, presenting with a Covid Related Skin Manifestation to the Perianal area with epidermal separation, pink tissue, and scant drainage. Also, the patient has a R. Foot ulceration to which the patients is being followed by Podiatry with a treatment plan in place to address this issue This is a 76yr old male patient admitted for Headache, presenting with the following:  -There is a Kali Terminal Ulcer to the Perianal area with epidermal separation, pink tissue, and scant drainage.   -The patient has a R. Foot ulceration to which the patients is being followed by Podiatry with a treatment plan in place to address this issue  -The patient is with pressure Injury prevention resources in place  -The patient is being evaluated for Osteomyelitis, Acute Respiratory Failure, COVID exposure, A. Fib, HTN, Diabetes, HLD, CKD, CAD, and Trasaminitis

## 2021-10-16 NOTE — PROGRESS NOTE ADULT - ASSESSMENT
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  As per daughter, patient was taking tylenol which did not help his pain prompting the patient to ask his daughter to take him to the hospital. ON admission, he has no fever or Leukocytosis. The Xray of Right foot shows no Osteomyelitis but MRI of Right foot shows Lateral soft tissue wound with marrow signal abnormality throughout the fifth metatarsal which is consistent of Osteomyelitis. He has seen by Podiatry and wound culture has sent, Zosyn and Vancomycin has started. The ID consult requested to assist with further evaluation and antibiotic management.     # Right Fifth metatarsal DFU with drainage and Osteomyelitis- wound cx grew Enterococcus, Aeromonas and Klebsiella - Zosyn is the ideal to cover All organisms but kidney function is worsening, hence change to Unasyn and Levaquin until kidney function is improved - The pathology shows Bone with acute osteomyelitis and necrotic periosteal tissue.   # OREN- s/p urinary retention -s/p ibrahim catheter - s/p discontinue ACEI  # RLL pneumonia- most likely Aspiration, S/p Vomiting - On Unasyn  # Large bowel distension  # Candiduria- 9/22/21  # Pulmonary edema with bilateral moderate to large pleural effusions- on CT chest, 10/5/21- s/p intubation 10/7- s/p Right sided chest tube placement by CT team, 10/8/21  # COVID Exposure - PCR negative as of  10/11/21    would recommend:    1. Please discontinue Vancomycin since completed the course  2. Monitor kidney function   3. Continue Meropenem to 1 g q 8hours based on crcl  4. Wound care as per Podiatry  5. Aspiration precaution    d/w Nursing  staff    Attending Attestation:    Spent more than 35 minutes on total encounter, more than 50 % of the visit was spent counseling and/or coordinating care by the Attending physician.

## 2021-10-16 NOTE — ADVANCED PRACTICE NURSE CONSULT - RECOMMEDATIONS
-Clean the Perianal area wound with normal saline and apply skin prep to the surrounding skin  -Apply Calcium Alginate (Aquacel) to the wound bed and cover with a Foam dressing Q 72hrs PRN  -Elevate/float the patients heels using heel protectors and reposition the patient Q 2hrs using wedges or pillows -Clean the Perianal area wound with normal saline and apply skin prep to the surrounding skin  -Please consult Palliative Care for further evaluation  -Apply Calcium Alginate (Aquacel) to the wound bed and cover with a Foam dressing Q 72hrs PRN  -Elevate/float the patients heels using heel protectors and reposition the patient Q 2hrs using wedges or pillows

## 2021-10-16 NOTE — PROGRESS NOTE ADULT - SUBJECTIVE AND OBJECTIVE BOX
Patient is a 76y old  Male who presents with a chief complaint of R Foot Pain (16 Oct 2021 10:35)    PATIENT IS SEEN AND EXAMINED IN MEDICAL FLOOR.    NGT [    ]    MADDY [   ]      GT [   ]    ALLERGIES:  No Known Allergies      Daily     Daily Weight in k.8 (16 Oct 2021 05:00)    VITALS:    Vital Signs Last 24 Hrs  T(C): 36.6 (16 Oct 2021 13:43), Max: 36.8 (15 Oct 2021 20:14)  T(F): 97.8 (16 Oct 2021 13:43), Max: 98.3 (15 Oct 2021 20:14)  HR: 89 (16 Oct 2021 13:43) (73 - 89)  BP: 124/57 (16 Oct 2021 13:43) (124/57 - 142/56)  BP(mean): --  RR: 19 (16 Oct 2021 13:43) (16 - 19)  SpO2: 96% (16 Oct 2021 13:43) (96% - 100%)    LABS:    CBC Full  -  ( 16 Oct 2021 06:33 )  WBC Count : 4.37 K/uL  RBC Count : 2.90 M/uL  Hemoglobin : 8.7 g/dL  Hematocrit : 26.7 %  Platelet Count - Automated : 187 K/uL  Mean Cell Volume : 92.1 fl  Mean Cell Hemoglobin : 30.0 pg  Mean Cell Hemoglobin Concentration : 32.6 gm/dL  Auto Neutrophil # : x  Auto Lymphocyte # : x  Auto Monocyte # : x  Auto Eosinophil # : x  Auto Basophil # : x  Auto Neutrophil % : x  Auto Lymphocyte % : x  Auto Monocyte % : x  Auto Eosinophil % : x  Auto Basophil % : x      10-16    140  |  108  |  16  ----------------------------<  103<H>  4.2   |  27  |  0.97    Ca    8.7      16 Oct 2021 04:04  Phos  3.2     10-  Mg     2.1     10-    TPro  6.3  /  Alb  2.1<L>  /  TBili  0.7  /  DBili  x   /  AST  44<H>  /  ALT  94<H>  /  AlkPhos  134<H>  10-16    CAPILLARY BLOOD GLUCOSE      POCT Blood Glucose.: 101 mg/dL (16 Oct 2021 16:57)  POCT Blood Glucose.: 102 mg/dL (16 Oct 2021 11:35)  POCT Blood Glucose.: 104 mg/dL (16 Oct 2021 06:29)  POCT Blood Glucose.: 105 mg/dL (16 Oct 2021 00:26)  POCT Blood Glucose.: 114 mg/dL (15 Oct 2021 21:19)        LIVER FUNCTIONS - ( 16 Oct 2021 04:04 )  Alb: 2.1 g/dL / Pro: 6.3 g/dL / ALK PHOS: 134 U/L / ALT: 94 U/L DA / AST: 44 U/L / GGT: x           Creatinine Trend: 0.97<--, 0.99<--, 0.99<--, 1.31<--, 1.43<--, 1.62<--  I&O's Summary    15 Oct 2021 07:01  -  16 Oct 2021 07:00  --------------------------------------------------------  IN: 0 mL / OUT: 1325 mL / NET: -1325 mL            .Body Fluid Pleural Fluid  10-08 @ 18:51   No growth at 1 week.  --  --      .Body Fluid Pleural Fluid  10-08 @ 18:34   No growth at 5 days  --    polymorphonuclear leukocytes seen  No organisms seen  by cytocentrifuge      .Tissue Right 5th toe r/o osteomyeltis   @ 13:54   No growth at 5 days  --    No polymorphonuclear cells seen per low power field  No organisms seen per oil power field      .Blood Blood-Venous   @ 10:28   No Growth Final  --  --      .Surgical Swab right foot wound   @ 21:42   Culture yields >4 types of aerobic and/or anaerobic bacteria  Call client services within 7 days if further workup is clinically  indicated. Culture includes  Rare Aeromonas hydrophila/caviae  Few Klebsiella oxytoca/Raoutella ornithinolytica  Few Enterococcus faecalis  Few Streptococcus agalactiae (Group B) isolated  Group B streptococci are susceptible to ampicillin,  penicillin and cefazolin, but may be resistant to  erythromycin and clindamycin.  Recommendations for intrapartum prophylaxis for Group B  streptococci are penicillin or ampicillin.  --  Aeromonas hydrophila/caviae  Klebsiella oxytoca /Raoutella ornithinolytica  Enterococcus faecalis      Bone R 5th metatarsal bone clean ma   @ 03:59   No growth at 5 days  --    No polymorphonuclear leukocytes seen per low power field  No organisms seen per oil power field      .Blood Blood-Venous   @ 21:09   No Growth Final  --  --      .Surgical Swab Right foot plantar wound   @ 21:08   Moderate Streptococcus agalactiae (Group B) isolated  Group B streptococci are susceptible to ampicillin,  penicillin and cefazolin, but may be resistant to  erythromycin and clindamycin.  Recommendations for intrapartum prophylaxis for Group B  streptococci are penicillin or ampicillin.  Moderate Bacteroides fragilis "Susceptibilities not performed"  Normal skin filiberto isolated  --  --          MEDICATIONS:    MEDICATIONS  (STANDING):  apixaban 5 milliGRAM(s) Oral two times a day  aspirin  chewable 81 milliGRAM(s) Oral daily  atorvastatin 80 milliGRAM(s) Oral at bedtime  chlorhexidine 2% Cloths 1 Application(s) Topical <User Schedule>  collagenase Ointment 1 Application(s) Topical daily  cyanocobalamin 1000 MICROGram(s) Oral daily  ergocalciferol 39554 Unit(s) Oral <User Schedule>  ferrous    sulfate Liquid 300 milliGRAM(s) Enteral Tube daily  finasteride 5 milliGRAM(s) Oral daily  gabapentin 100 milliGRAM(s) Oral three times a day  insulin lispro (ADMELOG) corrective regimen sliding scale   SubCutaneous every 6 hours  meropenem  IVPB 1000 milliGRAM(s) IV Intermittent every 12 hours  metoprolol tartrate 25 milliGRAM(s) Oral two times a day  NIFEdipine XL 60 milliGRAM(s) Oral daily  pantoprazole  Injectable 40 milliGRAM(s) IV Push at bedtime  vancomycin  IVPB 1250 milliGRAM(s) IV Intermittent daily      MEDICATIONS  (PRN):  ondansetron Injectable 4 milliGRAM(s) IV Push three times a day PRN Nausea and/or Vomiting  sodium chloride 0.9% lock flush 10 milliLiter(s) IV Push every 1 hour PRN Pre/post blood products, medications, blood draw, and to maintain line patency        REVIEW OF SYSTEMS:                           ALL ROS DONE [ X   ]      CONSTITUTIONAL:  LETHARGIC [   ], FEVER [   ], UNRESPONSIVE [   ]  CVS:  CP  [   ], SOB, [   ], PALPITATIONS [   ], DIZZYNESS [   ]  RS: COUGH [   ], SPUTUM [   ]  GI: ABDOMINAL PAIN [   ], NAUSEA [   ], VOMITINGS [   ], DIARRHEA [   ], CONSTIPATION [   ]  :  DYSURIA [   ], NOCTURIA [   ], INCREASED FREQUENCY [   ], DRIBLING [   ],  SKELETAL: PAINFUL JOINTS [   ], SWOLLEN JOINTS [   ], NECK ACHE [   ], LOW BACK ACHE [   ],  SKIN : ULCERS [   ], RASH [   ], ITCHING [   ]  CNS: HEAD ACHE [   ], DOUBLE VISION [   ], BLURRED VISION [   ], AMS / CONFUSION [   ], SEIZURES [   ], WEAKNESS [   ],TINGLING / NUMBNESS [   ]      PHYSICAL EXAMINATION:  GENERAL APPEARANCE: NO DISTRESS  HEENT:  NO PALLOR, NO  JVD,  NO   NODES, NECK SUPPLE  CVS: S1 +, S2 +,   RS: AEEB,  OCCASIONAL  RALES +,  RONCHI +, CRACKLES +     ABD: SOFT, NT, NO, BS +       EXT: NO PE  SKIN: WARM,   RIGHT FOOT WRAPPED, RIGHT CHEST TUBE SITE W/ BANDAGES  SKELETAL:  ROM ACCEPTABLE  CNS:  AAO X  2-3    RADIOLOGY :    EXAM:  CT ABDOMEN AND PELVIS OC                            PROCEDURE DATE:  2021          INTERPRETATION:  CLINICAL INFORMATION: Small bowel obstruction.    COMPARISON: None.    CONTRAST/COMPLICATIONS:  IV Contrast: NONE  Oral Contrast: Omnipaque 300  Complications: None reported at time of study completion    PROCEDURE:  CT of the Abdomen and Pelvis was performed.  Sagittal and coronal reformats were performed.    FINDINGS:  LOWER CHEST: Small bilateral pleural effusions with compressiveatelectasis of both lower lobes.    LIVER: Within normal limits.  BILE DUCTS: Normal caliber.  GALLBLADDER: Distended. Small layering gallstones.  SPLEEN: Within normal limits.  PANCREAS: Within normal limits.  ADRENALS: Within normal limits.  KIDNEYS/URETERS: No hydronephrosis. Nonobstructing left upper pole calculus, measuring 0.6 cm.    BLADDER: Urinary bladder contains air and a Castañeda catheter balloon.  REPRODUCTIVE ORGANS: Prostate is not enlarged.    BOWEL: No bowel obstruction. Appendix isnormal. Colonic diverticulosis.  PERITONEUM: Mild presacral fluid.  VESSELS: Atherosclerotic changes.  RETROPERITONEUM/LYMPH NODES: No lymphadenopathy.  ABDOMINAL WALL: Within normal limits.  BONES: Degenerative changes. Sternotomy.    IMPRESSION:  No acute intra-abdominal pathology.    Small bilateral pleural effusions with compressive atelectasis of both lower lobes.    ====================================================    EXAM:  MR FOOT RT                            PROCEDURE DATE:  2021          INTERPRETATION:  Clinical Information: Recent fifth metatarsal head resection now with fifth digit pain, swelling and foul discharge.    Comparison: Radiographs of the right foot from 2021 and MRI the right foot from 2021.    Technique:  MRI of the right midfoot and forefoot.  Intravenous Contrast: None.    Findings:    There is a resection at the mid aspect of the fifth metatarsal shaft. There is a lateral soft tissue wound beginning at the level of the amputation which extends distally. There is susceptibility artifact in the region of the amputation consistent with postoperative change. There is hyperintense T2 marrow signal throughout the remaining fifth metatarsal and within the adjacent fourth metatarsal head which is nonspecific and could be related to recent postoperative changes although osteomyelitis is suspected.    There is edema and mild atrophy within the plantar muscles of the foot. There is minimal spurring at the first metatarsophalangeal and hallux sesamoid articulations.    Impression:  Resection at the mid aspect of the fifth metatarsal. Lateral soft tissue wound with marrow signal abnormality throughout the fifth metatarsal and within the adjacent fourth metatarsal head which is nonspecific in the setting of recent surgery although osteomyelitis is suspected.        ASSESSMENT :     Headache    HTN (hypertension)    HLD (hyperlipidemia)    DM (diabetes mellitus)    CAD (coronary artery disease)    Glaucoma, angle-closure    [place] (hard of hearing)    No significant past surgical history    S/P CABG (coronary artery bypass graft)    History of thyroid surgery        PLAN:  HPI:  76M from home, lives with daughter w/ PMH DM on insulin, HTN, HLD, CAD, PSH R partial 5th ray amputation 2021 p/w R foot pain x1 day associated with foul smelling discharge. Pt with sharp pain on the R lateral foot. As per daughter, pt was taking tylenol which did not help his pain prompting the patient to ask his daughter to take him to the hospital. Pt is a poor historian. Daughter states that the patient did not see his podiatrist after the surgery. No fever, chest, pain, palpitations, nausea, vomiting, diarrhea (17 Sep 2021 01:11)    # TRANSFERRED TO ICU FOR WORSENING HYPOXIA AND DYSPNEA    # COVID EXPOSURE ON 10/13 - ON CONTACT AND DROPLET ISOLATION PRECAUTION; F/U COVID PCR    # SUSPECT RIGHT FOOT OSTEOMYELITIS S/P RIGHT 5TH RAY RESECTION [] AND S/P DEBRIDEMENT, GRAFT APPLICATION, BONE BX AND WOUND VAC APPLICATION  - BONE BX W/ ACUTE OSTEOMYELITIS AND NECROTIC PERIOSTEAL TISSUE  ** RECENT ADMISSION FOR RIGHT DIABETIC FOOT ULCER, CELLULITIS, OSTEOMYELITIS RIGHT 5th METATARSAL S/P RIGHT 5TH PARTIAL RAY AMPUTATION [] - BONE PATHOLOGY W/ CLEAN MARGINS    - S/P VANCOMYCIN AND ZOSYN ; NOW ON UNASYN AND LEVAQUIN GIVEN OREN; PER ID SWITCH TO ZOSYN UNTIL 11/3 [NOW ON VANC + MEROPENEM]  - RECENTLY HAD SIMON W/ MILD PAD  - REVIEWED WOUND CX [ ENTEROCOCCUS, AEROMONAS, KLEBSIELLA] AND BCX  - ID CONSULT, PODIATRY CONSULT    - PICC LINE PLACED TO COMPLETE 6 WEEKS OF ANTIBIOTICS - PER ID SWITCH TO ZOSYN UNTIL 11/3 ON D/C    # ACUTE HYPOXIC RESPIRATORY FAILURE SUSPECT S/T PULMONARY EDEMA IN THE SETTING OF SEVERE AORTIC STENOSIS + ASPIRATION PNA; COMBINED SYSTOLIC + DIASTOLIC HF - S/P CT TUBE PLACEMENT [SET TO PLEUROVAC] - SWITCHED FROM LEVAQUIN TO VANCOMYCIN + MEROPENEM, LASIX, CARDIOLOGY CONSULT IN PROGRESS  - CRITICAL CARE CONSULT  - ASPIRATION EVENT WHEN PATIENT REMOVED NGT   - S/P LASIX ON 10/1 - 10/2, 10/4 AND 10/5  - CT CHEST W/ BILATERAL PLEURAL EFFUSIONS [10/5] - PULMONOLOGY CONSULT FOR POSSIBLE THORACENTESIS & WILL CONTINUE LASIX   - ALSO F/U REPEAT ECHOCARDIOGRAM  - CT SURGERY WAS CONSULTED BUT UNABLE TO PLACE PIGTAIL, THUS IR CONSULT IN PROGRESS FOR PIGTAIL PLACEMENT  - CHEST TUBE PLACED [10/8] - FLUID CONSISTENT WITH TRANSUDATIVE PROCESS  - NOTED REPEAT ECHO  - S/P EXTUBATION 10/13  - S/P CHEST TUBE REMOVAL 10/15  - F/U 10/16 A.M. CXR    # SHOCK - ? S/T HYPOVOLEMIA VS. SEPSIS - S/P CVC [SWITCHED FROM FEMORAL TO LEFT IJ], S/P VASOPRESSORS - SWITCHED TO VANCOMYCIN AND MEROPENEM  - BCX - NGTD  - ID CONSULT IN PROGRESS    # TRANSIENT NEW ONSET A.FIB, PAROXYSMAL - ON ELIQUIS, CARDIOLOGY CONSULT IN PROGRESS    # FUNGURIA - D/C FLUCONAZOLE GIVEN TRANSAMINITIS    # TRANSAMINITIS - SUSPECT THIS IS ISCHEMIC HEPATITIS - IMPROVING - HEPATOLOGY CONSULT IN PROGRESS    # BOWEL DISTENTION - ? ILEUS - OPTIMIZING ELECTROLYTES - IMPROVED  - SURGERY CONSULT IN PROGRESS    # CHOLELITHIASIS - TRENDING LFTS, RUQ U/S - CHOLELITHIASIS, SURGERY CONSULT IN PROGRESS  - GI CONSULT IN PROGRESS - LESS SUSPICIOUS FOR CHOLECYSTITIS    # DYSPEPSIA - PLACED ON PPI BID WITH IMPROVEMENT, UNDERWENT ST EVAL     # ELEVATED TROPONINS - NSTEMI - ? DEMAND - WILL NEED ISCHEMIC EVAL ONCE ACUTE INFECTION RESOLVES PER CARDIOLOGY, CARDIOLOGY CONSULT IN PROGRESS  - ON ASA, STATIN, BB    # OREN ON CKD - S/T ATN AND URINARY RETENTION - S/P IVF, NOW W/ CASTÑAEDA, NEPHROLOGY CONUSLT IN PROGRESS  - ON FLOMAX, AND FINASTERIDE  - FAILED TOV WITH WORSENING RENAL FXN - NOTED URINARY RETENTION [10/4] - REPLACED CASTAÑEDA    # MEDICAL CLEARANCE FOR SURGERY - RCRI - 2 - 10.1 % 30 DAY RISK OF DEATH, MI OR CARDIAC ARREST  - CARDIOLOGY CONSULT     # DM -  HBA1C - 11  - LANTUS, SSI + FS    # CAD S/P CABG - PLACED ON ASA, STATIN, BB  - ECHO - SEVERE AORTIC STENOSIS, SEVERE CONCENTRIC LVH, G1DD, MILD OH  - CARDIOLOGY CONSULT - DR. BASSETT   - MAY NEED ISCHEMIC EVAL ONCE ACUTE ILLNESS RESOLVES INCLUDING CARDIAC CATHETERIZATION    # HTN - ON METOPROLOL, NICARDIPINE    # SEVERE, ASYMPTOMATIC, STENOSIS - ONCE ACUTE ILLNESS RESOLVES, WILL LIKELY NEED ISCHEMIC WORKUP, SABIHA TO EVALUATE FURTHER. D/W PATIENT, DAUGHTER AND CARDIOLOGY TEAM [PREVIOUSLY SAW DR. BASSETT]    # VITAMIN D DEFICIENCY - ON CHOLECALCIFEROL    # HLD - STATIN    # CASE DISCUSSED AT LENGTH WITH PATIENT AND DAUGHTER, BIRDIE ROBERT AT BEDSIDE AND VIA PHONE @ [phone] [10/5] - CASE DICUSSED AT LENGTH AND ALL QUESTIONS WERE ANSWERED. DAUGHTER UNDERSTOOD THAT PROGNOSIS IS GUARDED.  # PATIENT AND DAUGHTER REFUSED [facility] ON PREVIOUS ADMISSION, PREVIOUSLY WISHED FOR PATIENT RETURN HOME W/ HOME PT; HOWEVER NOW, DAUGHTER WISHES FOR PATIENT TO GO TO [facility], AS PATIENT'S WIFE IS CURRENTLY ADMITTED THERE    # GI AND DVT PPX    DR. JAELYN MUNSON       Patient is a 76y old  Male who presents with a chief complaint of R Foot Pain (16 Oct 2021 10:35)    PATIENT IS SEEN AND EXAMINED IN MEDICAL FLOOR.    NGT [    ]    MADDY [   ]      GT [   ]    ALLERGIES:  No Known Allergies      Daily     Daily Weight in k.8 (16 Oct 2021 05:00)    VITALS:    Vital Signs Last 24 Hrs  T(C): 36.6 (16 Oct 2021 13:43), Max: 36.8 (15 Oct 2021 20:14)  T(F): 97.8 (16 Oct 2021 13:43), Max: 98.3 (15 Oct 2021 20:14)  HR: 89 (16 Oct 2021 13:43) (73 - 89)  BP: 124/57 (16 Oct 2021 13:43) (124/57 - 142/56)  BP(mean): --  RR: 19 (16 Oct 2021 13:43) (16 - 19)  SpO2: 96% (16 Oct 2021 13:43) (96% - 100%)    LABS:    CBC Full  -  ( 16 Oct 2021 06:33 )  WBC Count : 4.37 K/uL  RBC Count : 2.90 M/uL  Hemoglobin : 8.7 g/dL  Hematocrit : 26.7 %  Platelet Count - Automated : 187 K/uL  Mean Cell Volume : 92.1 fl  Mean Cell Hemoglobin : 30.0 pg  Mean Cell Hemoglobin Concentration : 32.6 gm/dL  Auto Neutrophil # : x  Auto Lymphocyte # : x  Auto Monocyte # : x  Auto Eosinophil # : x  Auto Basophil # : x  Auto Neutrophil % : x  Auto Lymphocyte % : x  Auto Monocyte % : x  Auto Eosinophil % : x  Auto Basophil % : x      10-16    140  |  108  |  16  ----------------------------<  103<H>  4.2   |  27  |  0.97    Ca    8.7      16 Oct 2021 04:04  Phos  3.2     10-  Mg     2.1     10-    TPro  6.3  /  Alb  2.1<L>  /  TBili  0.7  /  DBili  x   /  AST  44<H>  /  ALT  94<H>  /  AlkPhos  134<H>  10-16    CAPILLARY BLOOD GLUCOSE      POCT Blood Glucose.: 101 mg/dL (16 Oct 2021 16:57)  POCT Blood Glucose.: 102 mg/dL (16 Oct 2021 11:35)  POCT Blood Glucose.: 104 mg/dL (16 Oct 2021 06:29)  POCT Blood Glucose.: 105 mg/dL (16 Oct 2021 00:26)  POCT Blood Glucose.: 114 mg/dL (15 Oct 2021 21:19)        LIVER FUNCTIONS - ( 16 Oct 2021 04:04 )  Alb: 2.1 g/dL / Pro: 6.3 g/dL / ALK PHOS: 134 U/L / ALT: 94 U/L DA / AST: 44 U/L / GGT: x           Creatinine Trend: 0.97<--, 0.99<--, 0.99<--, 1.31<--, 1.43<--, 1.62<--  I&O's Summary    15 Oct 2021 07:01  -  16 Oct 2021 07:00  --------------------------------------------------------  IN: 0 mL / OUT: 1325 mL / NET: -1325 mL            .Body Fluid Pleural Fluid  10-08 @ 18:51   No growth at 1 week.  --  --      .Body Fluid Pleural Fluid  10-08 @ 18:34   No growth at 5 days  --    polymorphonuclear leukocytes seen  No organisms seen  by cytocentrifuge      .Tissue Right 5th toe r/o osteomyeltis   @ 13:54   No growth at 5 days  --    No polymorphonuclear cells seen per low power field  No organisms seen per oil power field      .Blood Blood-Venous   @ 10:28   No Growth Final  --  --      .Surgical Swab right foot wound   @ 21:42   Culture yields >4 types of aerobic and/or anaerobic bacteria  Call client services within 7 days if further workup is clinically  indicated. Culture includes  Rare Aeromonas hydrophila/caviae  Few Klebsiella oxytoca/Raoutella ornithinolytica  Few Enterococcus faecalis  Few Streptococcus agalactiae (Group B) isolated  Group B streptococci are susceptible to ampicillin,  penicillin and cefazolin, but may be resistant to  erythromycin and clindamycin.  Recommendations for intrapartum prophylaxis for Group B  streptococci are penicillin or ampicillin.  --  Aeromonas hydrophila/caviae  Klebsiella oxytoca /Raoutella ornithinolytica  Enterococcus faecalis      Bone R 5th metatarsal bone clean ma   @ 03:59   No growth at 5 days  --    No polymorphonuclear leukocytes seen per low power field  No organisms seen per oil power field      .Blood Blood-Venous   @ 21:09   No Growth Final  --  --      .Surgical Swab Right foot plantar wound   @ 21:08   Moderate Streptococcus agalactiae (Group B) isolated  Group B streptococci are susceptible to ampicillin,  penicillin and cefazolin, but may be resistant to  erythromycin and clindamycin.  Recommendations for intrapartum prophylaxis for Group B  streptococci are penicillin or ampicillin.  Moderate Bacteroides fragilis "Susceptibilities not performed"  Normal skin filiberto isolated  --  --          MEDICATIONS:    MEDICATIONS  (STANDING):  apixaban 5 milliGRAM(s) Oral two times a day  aspirin  chewable 81 milliGRAM(s) Oral daily  atorvastatin 80 milliGRAM(s) Oral at bedtime  chlorhexidine 2% Cloths 1 Application(s) Topical <User Schedule>  collagenase Ointment 1 Application(s) Topical daily  cyanocobalamin 1000 MICROGram(s) Oral daily  ergocalciferol 16595 Unit(s) Oral <User Schedule>  ferrous    sulfate Liquid 300 milliGRAM(s) Enteral Tube daily  finasteride 5 milliGRAM(s) Oral daily  gabapentin 100 milliGRAM(s) Oral three times a day  insulin lispro (ADMELOG) corrective regimen sliding scale   SubCutaneous every 6 hours  meropenem  IVPB 1000 milliGRAM(s) IV Intermittent every 12 hours  metoprolol tartrate 25 milliGRAM(s) Oral two times a day  NIFEdipine XL 60 milliGRAM(s) Oral daily  pantoprazole  Injectable 40 milliGRAM(s) IV Push at bedtime  vancomycin  IVPB 1250 milliGRAM(s) IV Intermittent daily      MEDICATIONS  (PRN):  ondansetron Injectable 4 milliGRAM(s) IV Push three times a day PRN Nausea and/or Vomiting  sodium chloride 0.9% lock flush 10 milliLiter(s) IV Push every 1 hour PRN Pre/post blood products, medications, blood draw, and to maintain line patency        REVIEW OF SYSTEMS:                           ALL ROS DONE [ X   ]      CONSTITUTIONAL:  LETHARGIC [   ], FEVER [   ], UNRESPONSIVE [   ]  CVS:  CP  [   ], SOB, [   ], PALPITATIONS [   ], DIZZYNESS [   ]  RS: COUGH [   ], SPUTUM [   ]  GI: ABDOMINAL PAIN [   ], NAUSEA [   ], VOMITINGS [   ], DIARRHEA [   ], CONSTIPATION [   ]  :  DYSURIA [   ], NOCTURIA [   ], INCREASED FREQUENCY [   ], DRIBLING [   ],  SKELETAL: PAINFUL JOINTS [   ], SWOLLEN JOINTS [   ], NECK ACHE [   ], LOW BACK ACHE [   ],  SKIN : ULCERS [   ], RASH [   ], ITCHING [   ]  CNS: HEAD ACHE [   ], DOUBLE VISION [   ], BLURRED VISION [   ], AMS / CONFUSION [   ], SEIZURES [   ], WEAKNESS [   ],TINGLING / NUMBNESS [   ]      PHYSICAL EXAMINATION:    GENERAL APPEARANCE: NO DISTRESS  HEENT:  NO PALLOR, NO  JVD,  NO   NODES, NECK SUPPLE  CVS: S1 +, S2 +,   RS: AEEB,  OCCASIONAL  RALES +,  RONCHI +, CRACKLES +     ABD: SOFT, NT, NO, BS +       EXT: NO PE  SKIN: WARM,   RIGHT FOOT WRAPPED, RIGHT CHEST TUBE SITE W/ BANDAGES  SKELETAL:  ROM ACCEPTABLE  CNS:  AAO X  2-3        RADIOLOGY :    < from: Transthoracic Echocardiogram (10.07.21 @ 15:26) >  CONCLUSIONS:  1. Normal mitral valve. Moderate mitral regurgitation.  2. Calcified aortic valve with decreased opening, cannot  exclude bicuspid. Peak transaortic valve gradient equals  32.4 mm Hg, mean transaortic valve gradient equals 19 mm  Hg, dimensionless index 0.22, estimated aortic valve area  equals 0.6sqcm (by continuity equation), consistent with  paradoxical low-flow low-gradient severe aortic stenosis.  Consider dobutamine stress echocardiogram and/or SABIHA for  further evaluation.  No aortic valve regurgitation seen.  3. Normal aortic root.  4. Normal left atrium.  5. Moderate concentric left ventricular hypertrophy.  6. Moderate global left ventricular systolic dysfunction  (EF 40-45% by visual estimation).  7. Grade II diastolic dysfunction (moderate).  8. Normal right atrium.  9. Normal right ventricular size with decreased RV systolic  function (TAPSE 1.2 cm).  10. RV systolic pressure is borderline normal at  34 mm Hg.  11. Tricuspid valve not well seen. Trace tricuspid  regurgitation.  12. Normal pulmonic valve. Trace pulmonic insufficiency is  noted.  13. No pericardial effusion.  14. Bilateral pleural effusions.    < end of copied text >      EXAM:  CT ABDOMEN AND PELVIS OC                            PROCEDURE DATE:  2021          INTERPRETATION:  CLINICAL INFORMATION: Small bowel obstruction.    COMPARISON: None.    CONTRAST/COMPLICATIONS:  IV Contrast: NONE  Oral Contrast: Omnipaque 300  Complications: None reported at time of study completion    PROCEDURE:  CT of the Abdomen and Pelvis was performed.  Sagittal and coronal reformats were performed.    FINDINGS:  LOWER CHEST: Small bilateral pleural effusions with compressiveatelectasis of both lower lobes.    LIVER: Within normal limits.  BILE DUCTS: Normal caliber.  GALLBLADDER: Distended. Small layering gallstones.  SPLEEN: Within normal limits.  PANCREAS: Within normal limits.  ADRENALS: Within normal limits.  KIDNEYS/URETERS: No hydronephrosis. Nonobstructing left upper pole calculus, measuring 0.6 cm.    BLADDER: Urinary bladder contains air and a Castañeda catheter balloon.  REPRODUCTIVE ORGANS: Prostate is not enlarged.    BOWEL: No bowel obstruction. Appendix isnormal. Colonic diverticulosis.  PERITONEUM: Mild presacral fluid.  VESSELS: Atherosclerotic changes.  RETROPERITONEUM/LYMPH NODES: No lymphadenopathy.  ABDOMINAL WALL: Within normal limits.  BONES: Degenerative changes. Sternotomy.    IMPRESSION:  No acute intra-abdominal pathology.    Small bilateral pleural effusions with compressive atelectasis of both lower lobes.    ====================================================    EXAM:  MR FOOT RT                            PROCEDURE DATE:  2021          INTERPRETATION:  Clinical Information: Recent fifth metatarsal head resection now with fifth digit pain, swelling and foul discharge.    Comparison: Radiographs of the right foot from 2021 and MRI the right foot from 2021.    Technique:  MRI of the right midfoot and forefoot.  Intravenous Contrast: None.    Findings:    There is a resection at the mid aspect of the fifth metatarsal shaft. There is a lateral soft tissue wound beginning at the level of the amputation which extends distally. There is susceptibility artifact in the region of the amputation consistent with postoperative change. There is hyperintense T2 marrow signal throughout the remaining fifth metatarsal and within the adjacent fourth metatarsal head which is nonspecific and could be related to recent postoperative changes although osteomyelitis is suspected.    There is edema and mild atrophy within the plantar muscles of the foot. There is minimal spurring at the first metatarsophalangeal and hallux sesamoid articulations.    Impression:  Resection at the mid aspect of the fifth metatarsal. Lateral soft tissue wound with marrow signal abnormality throughout the fifth metatarsal and within the adjacent fourth metatarsal head which is nonspecific in the setting of recent surgery although osteomyelitis is suspected.        ASSESSMENT :     Headache    HTN (hypertension)    HLD (hyperlipidemia)    DM (diabetes mellitus)    CAD (coronary artery disease)    Glaucoma, angle-closure    Jena (hard of hearing)      S/P CABG (coronary artery bypass graft)    History of thyroid surgery        PLAN:    HPI:  76M from home, lives with daughter w/ PMH DM on insulin, HTN, HLD, CAD, PSH R partial 5th ray amputation 2021 p/w R foot pain x1 day associated with foul smelling discharge. Pt with sharp pain on the R lateral foot. As per daughter, pt was taking tylenol which did not help his pain prompting the patient to ask his daughter to take him to the hospital. Pt is a poor historian. Daughter states that the patient did not see his podiatrist after the surgery. No fever, chest, pain, palpitations, nausea, vomiting, diarrhea (17 Sep 2021 01:11)    # TRANSFERRED TO ICU FOR WORSENING HYPOXIA AND DYSPNEA    # COVID EXPOSURE ON 10/13 - ON CONTACT AND DROPLET ISOLATION PRECAUTION; F/U COVID PCR    # SUSPECT RIGHT FOOT OSTEOMYELITIS S/P RIGHT 5TH RAY RESECTION [] AND S/P DEBRIDEMENT, GRAFT APPLICATION, BONE BX AND WOUND VAC APPLICATION  - BONE BX W/ ACUTE OSTEOMYELITIS AND NECROTIC PERIOSTEAL TISSUE  ** RECENT ADMISSION FOR RIGHT DIABETIC FOOT ULCER, CELLULITIS, OSTEOMYELITIS RIGHT 5th METATARSAL S/P RIGHT 5TH PARTIAL RAY AMPUTATION [] - BONE PATHOLOGY W/ CLEAN MARGINS    - S/P VANCOMYCIN AND ZOSYN ; NOW ON UNASYN AND LEVAQUIN GIVEN OREN; PER ID SWITCH TO ZOSYN UNTIL 11/3 [NOW ON VANC + MEROPENEM]  - RECENTLY HAD SIMON W/ MILD PAD  - REVIEWED WOUND CX [ ENTEROCOCCUS, AEROMONAS, KLEBSIELLA] AND BCX  - ID CONSULT, PODIATRY CONSULT    - PICC LINE PLACED TO COMPLETE 6 WEEKS OF ANTIBIOTICS - PER ID SWITCH TO ZOSYN UNTIL 11/3 ON D/C    # ACUTE HYPOXIC RESPIRATORY FAILURE DUE TO ACUTE ON CHRONIC SYSTOLIC CHF  (  LV EF 40- 45 % ) , DIASTOLIC LV DYSFUNCTION , ,  SEVERE AORTIC STENOSIS & MODERATE MITRAL REGURGITATION  &  ASPIRATION PNA;  - S/P CHEST TUBE INSERTION AND REMOVAL  , S/P LASIX ,   CARDIOLOGY CONSULT IN PROGRESS      - CHEST TUBE PLACED [10/8] - FLUID CONSISTENT WITH TRANSUDATIVE PROCESS  - S/P EXTUBATION 10/13  - S/P CHEST TUBE REMOVAL 10/15      # SEPTIC SHOCK - ? S/T HYPOVOLEMIA VS. SEPSIS - S/P CVC [SWITCHED FROM FEMORAL TO LEFT IJ], S/P VASOPRESSORS - RESOLING  - BCX - NGTD      # TRANSIENT NEW ONSET A.FIB, PAROXYSMAL - ON ELIQUIS, CARDIOLOGY CONSULT IN PROGRESS    # FUNGURIA - D/C FLUCONAZOLE GIVEN TRANSAMINITIS    # TRANSAMINITIS - SUSPECT THIS IS ISCHEMIC HEPATITIS - IMPROVING - HEPATOLOGY CONSULT IN PROGRESS    # BOWEL DISTENTION - ? ILEUS - OPTIMIZING ELECTROLYTES - IMPROVED  - SURGERY CONSULT IN PROGRESS    # CHOLELITHIASIS - TRENDING LFTS, RUQ U/S - CHOLELITHIASIS, SURGERY CONSULT IN PROGRESS  - GI CONSULT IN PROGRESS - LESS SUSPICIOUS FOR CHOLECYSTITIS    # DYSPEPSIA - PLACED ON PPI BID WITH IMPROVEMENT, UNDERWENT ST EVAL     # ELEVATED TROPONINS - NSTEMI - ? DEMAND - WILL NEED ISCHEMIC EVAL ONCE ACUTE INFECTION RESOLVES PER CARDIOLOGY, CARDIOLOGY CONSULT IN PROGRESS  - ON ASA, STATIN, BB    # OREN ON CKD - S/T ATN AND URINARY RETENTION - S/P IVF, NOW W/ CASTAÑEDA, NEPHROLOGY CONUSLT IN PROGRESS  -  BPH ON FLOMAX, AND FINASTERIDE  - FAILED TOV WITH WORSENING RENAL FXN - NOTED URINARY RETENTION [10/4] - REPLACED CASTAÑEDA    # MEDICAL CLEARANCE FOR SURGERY - RCRI - 2 - 10.1 % 30 DAY RISK OF DEATH, MI OR CARDIAC ARREST  - CARDIOLOGY CONSULT     # DM -  HBA1C - 11  - LANTUS, SSI + FS    # CAD S/P CABG - PLACED ON ASA, STATIN, BB  - ECHO - SEVERE AORTIC STENOSIS, SEVERE CONCENTRIC LVH, G1DD, MILD PA  - CARDIOLOGY CONSULT - DR. BASSETT   - MAY NEED ISCHEMIC EVAL ONCE ACUTE ILLNESS RESOLVES INCLUDING CARDIAC CATHETERIZATION    # HTN, HLD  - ON METOPROLOL, NICARDIPINE, STATIN       #  IMPAIRED GAIT DUE TO CERVICAL & LS SPONDYLOSIS, POLY ARTHRITIS AND DIABETIC PERIPHERAL NEUROPATHY, OP VITAMIN D DEFICIENCY - ON CHOLECALCIFEROL, OBTAIN PT & OT   #  FAILURE TO THRIVE , MODERATE PROTEIN CALORIE MALNUTRITION       # CASE DISCUSSED AT LENGTH WITH PATIENT AND DAUGHTER, BIRDIE ROBERT AT BEDSIDE AND VIA PHONE @ [phone] [10/5] - CASE DICUSSED AT LENGTH AND ALL QUESTIONS WERE ANSWERED. DAUGHTER UNDERSTOOD THAT PROGNOSIS IS GUARDED.  # PATIENT AND DAUGHTER REFUSED [facility] ON PREVIOUS ADMISSION, PREVIOUSLY WISHED FOR PATIENT RETURN HOME W/ HOME PT; HOWEVER NOW, DAUGHTER WISHES FOR PATIENT TO GO TO [facility], AS PATIENT'S WIFE IS CURRENTLY ADMITTED THERE    # GI AND DVT PPX    DR. JAELYN MUNSON

## 2021-10-16 NOTE — PROGRESS NOTE ADULT - SUBJECTIVE AND OBJECTIVE BOX
Patient is seen and examined at the bed side, is afebrile. The kidney function stay normal.       REVIEW OF SYSTEMS: All other review systems are negative      ALLERGIES: No Known Allergies      Vital Signs Last 24 Hrs  T(C): 36.6 (16 Oct 2021 13:43), Max: 36.8 (15 Oct 2021 20:14)  T(F): 97.8 (16 Oct 2021 13:43), Max: 98.3 (15 Oct 2021 20:14)  HR: 89 (16 Oct 2021 13:43) (73 - 89)  BP: 124/57 (16 Oct 2021 13:43) (124/57 - 142/56)  BP(mean): --  RR: 19 (16 Oct 2021 13:43) (16 - 19)  SpO2: 96% (16 Oct 2021 13:43) (96% - 100%)      PHYSICAL EXAM:  GENERAL: Not in distress  CHEST/LUNG:  Not using accessory muscles, s/p removal of Right CT   HEART: s1 and s2 present  ABDOMEN:  Mild distended   EXTREMITIES: Right foot bandage in placed   CNS: Awake and alert       LABS:                        8.7    4.37  )-----------( 187      ( 16 Oct 2021 06:33 )             26.7                           8.4    5.22  )-----------( 184      ( 15 Oct 2021 06:39 )             26.4         10-16    140  |  108  |  16  ----------------------------<  103<H>  4.2   |  27  |  0.97    Ca    8.7      16 Oct 2021 04:04  Phos  3.2     10-16  Mg     2.1     10-16    TPro  6.3  /  Alb  2.1<L>  /  TBili  0.7  /  DBili  x   /  AST  44<H>  /  ALT  94<H>  /  AlkPhos  134<H>  10-16      10-15    144  |  111<H>  |  17  ----------------------------<  118<H>  4.5   |  29  |  0.99    Ca    8.6      15 Oct 2021 03:57  Phos  3.1     10-15  Mg     2.1     10-15    TPro  6.1  /  Alb  2.1<L>  /  TBili  0.6  /  DBili  x   /  AST  63<H>  /  ALT  126<H>  /  AlkPhos  139<H>  10-15      10-13    145  |  111<H>  |  19<H>  ----------------------------<  199<H>  4.2   |  28  |  1.31<H>    Ca    8.6      13 Oct 2021 04:58  Phos  2.2     10-13  Mg     2.1     10-13    TPro  6.5  /  Alb  2.1<L>  /  TBili  0.5  /  DBili  x   /  AST  153<H>  /  ALT  227<H>  /  AlkPhos  180<H>  10-13      09-25    139  |  107  |  41<H>  ----------------------------<  134<H>  4.1   |  21<L>  |  4.05<H>    Ca    8.6      25 Sep 2021 06:40    TPro  6.6  /  Alb  2.3<L>  /  TBili  0.5  /  DBili  x   /  AST  25  /  ALT  15  /  AlkPhos  102  09-25        CAPILLARY BLOOD GLUCOSE  POCT Blood Glucose.: 134 mg/dL (17 Sep 2021 17:11)  POCT Blood Glucose.: 128 mg/dL (17 Sep 2021 11:06)  POCT Blood Glucose.: 157 mg/dL (17 Sep 2021 04:10)      Vancomycin Level, Trough (10.14.21 @ 11:32) : 21.8  Vancomycin Level, Trough (10.12.21 @ 12:13)   Vancomycin Level, Trough: 19.5:      MEDICATIONS  (STANDING):    apixaban 5 milliGRAM(s) Oral two times a day  aspirin  chewable 81 milliGRAM(s) Oral daily  atorvastatin 80 milliGRAM(s) Oral at bedtime  chlorhexidine 2% Cloths 1 Application(s) Topical <User Schedule>  collagenase Ointment 1 Application(s) Topical daily  cyanocobalamin 1000 MICROGram(s) Oral daily  ergocalciferol 75119 Unit(s) Oral <User Schedule>  ferrous    sulfate Liquid 300 milliGRAM(s) Enteral Tube daily  finasteride 5 milliGRAM(s) Oral daily  gabapentin 100 milliGRAM(s) Oral three times a day  insulin lispro (ADMELOG) corrective regimen sliding scale   SubCutaneous every 6 hours  meropenem  IVPB 1000 milliGRAM(s) IV Intermittent every 12 hours  metoprolol tartrate 25 milliGRAM(s) Oral two times a day  NIFEdipine XL 60 milliGRAM(s) Oral daily  pantoprazole  Injectable 40 milliGRAM(s) IV Push at bedtime  vancomycin  IVPB 1250 milliGRAM(s) IV Intermittent daily        RADIOLOGY & ADDITIONAL TESTS:    10/5/21 CT Chest No Cont (10.05.21 @ 14:07) Pulmonary edema with bilateral moderate to large pleural effusions. Underlying bilateral lower lobe compressive atelectasis versus pneumonia.  Mildly enlarged mediastinal lymph nodes, possibly reactive.      10/2/21: Xray Chest 1 View- PORTABLE-Routine (Xray Chest 1 View- PORTABLE-Routine in AM.) (10.02.21 @ 09:46) There is persistent bilateral perihilar/basilar diffuse airspace disease and/or RIGHT effusion.  Cardiomegaly.  No interval change.    Collected Date/Time: 9/22/2021 08:42 EDT   Received Date/Time: 9/23/2021 09:29 EDT   Surgical Pathology Report - Auth (Verified)   Specimen(s) Submitted   1 Right 5th Toe Wound Debridement r/o Osteomyelitis   Final Diagnosis   Right fifth toe; wound debridement:   - Bone with acute osteomyelitis and necrotic periosteal tissue.     9/28/21: US Abdomen Upper Quadrant Right (09.28.21 @ 12:13) Cholelithiasis without evidence of acute cholecystitis. Note is made of right pleural effusion    9/27/21: CT Abdomen and Pelvis w/ Oral Cont (09.27.21 @ 15:53) >No acute intra-abdominal pathology.    Small bilateral pleural effusions with compressive atelectasis of both lower lobes.    9/26/21: Xray Chest 1 View-PORTABLE IMMEDIATE (Xray Chest 1 View-PORTABLE IMMEDIATE .) (09.26.21 @ 15:28) : Small bilateral pleural effusions and mild pulmonary edema. A right lower lobe pneumonia is not excluded.      9/26/21: Xray Abdomen 1 View Portable, IMMEDIATE (Xray Abdomen 1 View Portable, IMMEDIATE .) (09.26.21 @ 15:28) : There is a nasogastric tube with its tip in the stomach. There is gaseous distention of the colon. No pathologic calcifications are seen. The osseous structures are intact with degenerative change is present in the spine.      9/17/21 : MR Foot No Cont, Right (09.17.21 @ 13:04) : Resection at the mid aspect of the fifth metatarsal. Lateral soft tissue wound with marrow signal abnormality throughout the fifth metatarsal and within the adjacent fourth metatarsal head which is nonspecific in the setting of recent surgery although osteomyelitis is suspected.    9/16/21 : Xray Foot AP + Lateral + Oblique, Right (09.16.21 @ 15:03) : No acute finding. No plain film evidence of osteomyelitis.        MICROBIOLOGY DATA:    COVID-19 PCR (10.11.21 @ 05:44)   COVID-19 PCR: NotDetec:   Urine Microscopic-Add On (NC) (09.22.21 @ 16:14)   Red Blood Cell - Urine: 0-2 /HPF   White Blood Cell - Urine: 3-5 /HPF   Bacteria: Moderate /HPF   Comment - Urine: yeast cells present   Epithelial Cells: Moderate /HPF     Culture - Tissue with Gram Stain (09.22.21 @ 13:54)   Gram Stain: No polymorphonuclear cells seen per low power field   No organisms seen per oil power field   Specimen Source: .Tissue Right 5th toe r/o osteomyeltis   Culture Results: No growth     COVID-19 David Domain Antibody (09.18.21 @ 12:55)   COVID-19 David Domain Antibody Result: >250.00:    Culture - Blood (09.17.21 @ 10:28)   Specimen Source: .Blood Blood-Peripheral   Culture Results: No growth to date.     Culture - Blood (09.17.21 @ 10:28)   Specimen Source: .Blood Blood-Venous   Culture Results: No growth to date.     Culture - Surgical Swab (09.16.21 @ 21:42)   - Amikacin: S <=16   - Amikacin: S <=16   - Amoxicillin/Clavulanic Acid: S <=8/4   - Ampicillin: R >16 These ampicillin results predict results for amoxicillin   - Ampicillin: S <=2 Predicts results to ampicillin/sulbactam, amoxacillin-clavulanate and piperacillin-tazobactam.   - Ampicillin/Sulbactam: S 8/4 Enterobacter, Citrobacter, and Serratia may develop resistance during prolonged therapy (3-4 days)   - Ampicillin/Sulbactam: R >16/8   - Aztreonam: S <=4   - Aztreonam: S <=4   - Cefazolin: R 16 Enterobacter, Citrobacter, and Serratia may develop resistance during prolonged therapy (3-4 days)   - Cefazolin: R >16   - Cefepime: S <=2   - Cefepime: S <=2   - Cefoxitin: S <=8   - Cefoxitin: S <=8   - Ceftazidime: S <=1   - Ceftriaxone: S <=1   - Ceftriaxone: S <=1 Enterobacter, Citrobacter, and Serratia may develop resistance during prolonged therapy   - Ciprofloxacin: S <=0.25   - Ciprofloxacin: S <=0.25   - Ertapenem: S <=0.5   - Gentamicin: S <=2   - Gentamicin: S <=2   - Imipenem: S <=1   - Levofloxacin: S <=0.5   - Levofloxacin: S <=0.5   - Meropenem: S <=1   - Meropenem: S <=1   - Piperacillin/Tazobactam: S <=8   - Piperacillin/Tazobactam: S <=8   - Tetra/Doxy: S 4   - Tobramycin: S <=2   - Trimethoprim/Sulfamethoxazole: S <=0.5/9.5   - Trimethoprim/Sulfamethoxazole: S <=0.5/9.5   - Vancomycin: S 2   Specimen Source: .Surgical Swab right foot wound   Culture Results:   Culture yields >4 types of aerobic and/or anaerobic bacteria   Call client services within 7 days if further workup is clinically   indicated. Culture includes   Rare Aeromonas hydrophila/caviae   Few Klebsiella oxytoca/Raoutella ornithinolytica   Few Enterococcus faecalis   Few Streptococcus agalactiae (Group B) isolated   Group B streptococci are susceptible to ampicillin,   penicillin and cefazolin, but may be resistant to   erythromycin and clindamycin.   Recommendations for intrapartum prophylaxis for Group B   streptococci are penicillin or ampicillin.   Organism Identification: Aeromonas hydrophila/caviae   Klebsiella oxytoca /Raoutella ornithinolytica   Enterococcus faecalis   Organism: Aeromonas hydrophila/caviae   Organism: Klebsiella oxytoca /Raoutella ornithinolytica   Organism: Enterococcus faecalis   COVID-19 PCR . (09.16.21 @ 22:24)   COVID-19 PCR: NotDetec:

## 2021-10-16 NOTE — PROGRESS NOTE ADULT - SUBJECTIVE AND OBJECTIVE BOX
INTERVAL HPI/OVERNIGHT EVENTS:  Pt seen and examined. No acute complaints  Denied chest pain or shortness of breathe    MEDICATIONS  (STANDING):  apixaban 5 milliGRAM(s) Oral two times a day  aspirin  chewable 81 milliGRAM(s) Oral daily  atorvastatin 80 milliGRAM(s) Oral at bedtime  chlorhexidine 2% Cloths 1 Application(s) Topical <User Schedule>  collagenase Ointment 1 Application(s) Topical daily  cyanocobalamin 1000 MICROGram(s) Oral daily  ergocalciferol 92871 Unit(s) Oral <User Schedule>  ferrous    sulfate Liquid 300 milliGRAM(s) Enteral Tube daily  finasteride 5 milliGRAM(s) Oral daily  gabapentin 100 milliGRAM(s) Oral three times a day  insulin lispro (ADMELOG) corrective regimen sliding scale   SubCutaneous every 6 hours  meropenem  IVPB 1000 milliGRAM(s) IV Intermittent every 12 hours  metoprolol tartrate 25 milliGRAM(s) Oral two times a day  NIFEdipine XL 60 milliGRAM(s) Oral daily  pantoprazole  Injectable 40 milliGRAM(s) IV Push at bedtime  vancomycin  IVPB 1250 milliGRAM(s) IV Intermittent daily    MEDICATIONS  (PRN):  ondansetron Injectable 4 milliGRAM(s) IV Push three times a day PRN Nausea and/or Vomiting  sodium chloride 0.9% lock flush 10 milliLiter(s) IV Push every 1 hour PRN Pre/post blood products, medications, blood draw, and to maintain line patency      Vital Signs Last 24 Hrs  T(C): 36.7 (16 Oct 2021 05:00), Max: 36.8 (15 Oct 2021 20:14)  T(F): 98 (16 Oct 2021 05:00), Max: 98.3 (15 Oct 2021 20:14)  HR: 80 (16 Oct 2021 05:00) (73 - 81)  BP: 135/69 (16 Oct 2021 05:00) (124/73 - 142/56)  RR: 16 (16 Oct 2021 05:00) (16 - 20)  SpO2: 100% (16 Oct 2021 05:00) (98% - 100%)    Physical:  General: A&Ox3. NAD  Chest: respiration unlabored, symmetric chest rise. no accessory muscle use    I&O's Detail  15 Oct 2021 07:01  -  16 Oct 2021 07:00  --------------------------------------------------------  IN:  Total IN: 0 mL    OUT:    Indwelling Catheter - Urethral (mL): 1325 mL  Total OUT: 1325 mL    Total NET: -1325 mL    LABS:                        8.7    4.37  )-----------( 187      ( 16 Oct 2021 06:33 )             26.7             10-16    140  |  108  |  16  ----------------------------<  103<H>  4.2   |  27  |  0.97    Ca    8.7      16 Oct 2021 04:04  Phos  3.2     10-16  Mg     2.1     10-16    TPro  6.3  /  Alb  2.1<L>  /  TBili  0.7  /  DBili  x   /  AST  44<H>  /  ALT  94<H>  /  AlkPhos  134<H>  10-16    < from: Xray Chest 1 View- PORTABLE-Urgent (Xray Chest 1 View- PORTABLE-Urgent .) (10.15.21 @ 11:43) >  EXAM:  XR CHEST PORTABLE URGENT 1V                            PROCEDURE DATE:  10/15/2021          INTERPRETATION:  Status post pigtail catheter removal.    AP chest. Prior earlier same day.    IMPRESSION: Right chest pigtail catheter has been removed. There is no discernible pneumothorax or other interval change in the exam.    --- End of Report ---            LYNDSEY WALLS MD; Attending Radiologist  This document has been electronically signed. Oct 16 2021  9:15AM    < end of copied text >

## 2021-10-16 NOTE — PROGRESS NOTE ADULT - SUBJECTIVE AND OBJECTIVE BOX
C A R D I O L O G Y  **********************************    DATE OF SERVICE: 10-16-21    Patient denies chest pain breathing comfortable   Review of symptoms otherwise negative.         apixaban 5 milliGRAM(s) Oral two times a day  aspirin  chewable 81 milliGRAM(s) Oral daily  atorvastatin 80 milliGRAM(s) Oral at bedtime  chlorhexidine 2% Cloths 1 Application(s) Topical <User Schedule>  collagenase Ointment 1 Application(s) Topical daily  cyanocobalamin 1000 MICROGram(s) Oral daily  ergocalciferol 76637 Unit(s) Oral <User Schedule>  ferrous    sulfate Liquid 300 milliGRAM(s) Enteral Tube daily  finasteride 5 milliGRAM(s) Oral daily  gabapentin 100 milliGRAM(s) Oral three times a day  insulin lispro (ADMELOG) corrective regimen sliding scale   SubCutaneous every 6 hours  meropenem  IVPB 1000 milliGRAM(s) IV Intermittent every 12 hours  metoprolol tartrate 25 milliGRAM(s) Oral two times a day  NIFEdipine XL 60 milliGRAM(s) Oral daily  ondansetron Injectable 4 milliGRAM(s) IV Push three times a day PRN  pantoprazole  Injectable 40 milliGRAM(s) IV Push at bedtime  sodium chloride 0.9% lock flush 10 milliLiter(s) IV Push every 1 hour PRN  vancomycin  IVPB 1250 milliGRAM(s) IV Intermittent daily                            8.4    5.22  )-----------( 184      ( 15 Oct 2021 06:39 )             26.4       Hemoglobin: 8.4 g/dL (10-15 @ 06:39)  Hemoglobin: 8.2 g/dL (10-15 @ 03:57)  Hemoglobin: 8.3 g/dL (10-14 @ 03:44)  Hemoglobin: 8.6 g/dL (10-13 @ 04:58)  Hemoglobin: 8.1 g/dL (10-12 @ 12:13)      10-15    144  |  111<H>  |  17  ----------------------------<  118<H>  4.5   |  29  |  0.99    Ca    8.6      15 Oct 2021 03:57  Phos  3.1     10-15  Mg     2.1     10-15    TPro  6.1  /  Alb  2.1<L>  /  TBili  0.6  /  DBili  x   /  AST  63<H>  /  ALT  126<H>  /  AlkPhos  139<H>  10-15    Creatinine Trend: 0.99<--, 0.99<--, 1.31<--, 1.43<--, 1.62<--, 1.95<--    COAGS:           T(C): 36.8 (10-15-21 @ 20:14), Max: 36.8 (10-15-21 @ 20:14)  HR: 73 (10-15-21 @ 20:14) (73 - 83)  BP: 142/56 (10-15-21 @ 20:14) (124/73 - 150/77)  RR: 17 (10-15-21 @ 20:14) (17 - 20)  SpO2: 98% (10-15-21 @ 20:14) (98% - 100%)  Wt(kg): --    I&O's Summary    14 Oct 2021 07:01  -  15 Oct 2021 07:00  --------------------------------------------------------  IN: 350 mL / OUT: 1140 mL / NET: -790 mL    15 Oct 2021 07:01  -  16 Oct 2021 03:21  --------------------------------------------------------  IN: 0 mL / OUT: 925 mL / NET: -925 mL      HEENT:  (-)icterus (-)pallor  CV: N S1 S2 1/6 TRINIDAD (+)2 Pulses B/l  Resp:  Coarse sounds B/L, normal effort  GI: (+) BS Soft, NT, ND  Lymph:  (-)Edema, (-)obvious lymphadenopathy  Skin: Warm to touch, Normal turgor  Psych: Unable to adequately  mood and affect    Tele: SR      ASSESSMENT/PLAN: 	76y  Male PMH DM on insulin, HTN, HLD, normal lV fx moderate to severe AS 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    - A/C with eliquis for PAF. hr stable   - Abx per primary team  - Echo noted, Severe AS, EF 40-45%   - He will need a SABIHA and R+L heart cath when he recovers and has no active infection  - Cont medical management of CAD  - pigtail removed, right side post xray stable.   - cont supportive care

## 2021-10-17 LAB
ALBUMIN SERPL ELPH-MCNC: 2.3 G/DL — LOW (ref 3.5–5)
ALP SERPL-CCNC: 134 U/L — HIGH (ref 40–120)
ALT FLD-CCNC: 81 U/L DA — HIGH (ref 10–60)
ANION GAP SERPL CALC-SCNC: 6 MMOL/L — SIGNIFICANT CHANGE UP (ref 5–17)
AST SERPL-CCNC: 39 U/L — SIGNIFICANT CHANGE UP (ref 10–40)
BILIRUB SERPL-MCNC: 0.8 MG/DL — SIGNIFICANT CHANGE UP (ref 0.2–1.2)
BUN SERPL-MCNC: 17 MG/DL — SIGNIFICANT CHANGE UP (ref 7–18)
CALCIUM SERPL-MCNC: 8.8 MG/DL — SIGNIFICANT CHANGE UP (ref 8.4–10.5)
CHLORIDE SERPL-SCNC: 106 MMOL/L — SIGNIFICANT CHANGE UP (ref 96–108)
CO2 SERPL-SCNC: 26 MMOL/L — SIGNIFICANT CHANGE UP (ref 22–31)
CREAT SERPL-MCNC: 1.05 MG/DL — SIGNIFICANT CHANGE UP (ref 0.5–1.3)
GLUCOSE BLDC GLUCOMTR-MCNC: 126 MG/DL — HIGH (ref 70–99)
GLUCOSE BLDC GLUCOMTR-MCNC: 85 MG/DL — SIGNIFICANT CHANGE UP (ref 70–99)
GLUCOSE BLDC GLUCOMTR-MCNC: 85 MG/DL — SIGNIFICANT CHANGE UP (ref 70–99)
GLUCOSE BLDC GLUCOMTR-MCNC: 91 MG/DL — SIGNIFICANT CHANGE UP (ref 70–99)
GLUCOSE BLDC GLUCOMTR-MCNC: 98 MG/DL — SIGNIFICANT CHANGE UP (ref 70–99)
GLUCOSE SERPL-MCNC: 90 MG/DL — SIGNIFICANT CHANGE UP (ref 70–99)
HCT VFR BLD CALC: 29.7 % — LOW (ref 39–50)
HGB BLD-MCNC: 9.5 G/DL — LOW (ref 13–17)
MCHC RBC-ENTMCNC: 29.3 PG — SIGNIFICANT CHANGE UP (ref 27–34)
MCHC RBC-ENTMCNC: 32 GM/DL — SIGNIFICANT CHANGE UP (ref 32–36)
MCV RBC AUTO: 91.7 FL — SIGNIFICANT CHANGE UP (ref 80–100)
NRBC # BLD: 0 /100 WBCS — SIGNIFICANT CHANGE UP (ref 0–0)
PLATELET # BLD AUTO: 211 K/UL — SIGNIFICANT CHANGE UP (ref 150–400)
POTASSIUM SERPL-MCNC: 4.1 MMOL/L — SIGNIFICANT CHANGE UP (ref 3.5–5.3)
POTASSIUM SERPL-SCNC: 4.1 MMOL/L — SIGNIFICANT CHANGE UP (ref 3.5–5.3)
PROT SERPL-MCNC: 6.7 G/DL — SIGNIFICANT CHANGE UP (ref 6–8.3)
RBC # BLD: 3.24 M/UL — LOW (ref 4.2–5.8)
RBC # FLD: 14.3 % — SIGNIFICANT CHANGE UP (ref 10.3–14.5)
SARS-COV-2 RNA SPEC QL NAA+PROBE: SIGNIFICANT CHANGE UP
SODIUM SERPL-SCNC: 138 MMOL/L — SIGNIFICANT CHANGE UP (ref 135–145)
WBC # BLD: 4.02 K/UL — SIGNIFICANT CHANGE UP (ref 3.8–10.5)
WBC # FLD AUTO: 4.02 K/UL — SIGNIFICANT CHANGE UP (ref 3.8–10.5)

## 2021-10-17 RX ORDER — SODIUM CHLORIDE 9 MG/ML
1000 INJECTION, SOLUTION INTRAVENOUS
Refills: 0 | Status: DISCONTINUED | OUTPATIENT
Start: 2021-10-17 | End: 2021-10-18

## 2021-10-17 RX ADMIN — APIXABAN 5 MILLIGRAM(S): 2.5 TABLET, FILM COATED ORAL at 17:28

## 2021-10-17 RX ADMIN — PREGABALIN 1000 MICROGRAM(S): 225 CAPSULE ORAL at 12:42

## 2021-10-17 RX ADMIN — MEROPENEM 100 MILLIGRAM(S): 1 INJECTION INTRAVENOUS at 17:27

## 2021-10-17 RX ADMIN — Medication 300 MILLIGRAM(S): at 12:42

## 2021-10-17 RX ADMIN — Medication 60 MILLIGRAM(S): at 05:20

## 2021-10-17 RX ADMIN — SODIUM CHLORIDE 75 MILLILITER(S): 9 INJECTION, SOLUTION INTRAVENOUS at 21:44

## 2021-10-17 RX ADMIN — ATORVASTATIN CALCIUM 80 MILLIGRAM(S): 80 TABLET, FILM COATED ORAL at 21:44

## 2021-10-17 RX ADMIN — CHLORHEXIDINE GLUCONATE 1 APPLICATION(S): 213 SOLUTION TOPICAL at 05:20

## 2021-10-17 RX ADMIN — APIXABAN 5 MILLIGRAM(S): 2.5 TABLET, FILM COATED ORAL at 05:20

## 2021-10-17 RX ADMIN — MEROPENEM 100 MILLIGRAM(S): 1 INJECTION INTRAVENOUS at 05:20

## 2021-10-17 RX ADMIN — Medication 25 MILLIGRAM(S): at 17:28

## 2021-10-17 RX ADMIN — GABAPENTIN 100 MILLIGRAM(S): 400 CAPSULE ORAL at 05:20

## 2021-10-17 RX ADMIN — ERGOCALCIFEROL 50000 UNIT(S): 1.25 CAPSULE ORAL at 12:42

## 2021-10-17 RX ADMIN — Medication 81 MILLIGRAM(S): at 12:42

## 2021-10-17 RX ADMIN — PANTOPRAZOLE SODIUM 40 MILLIGRAM(S): 20 TABLET, DELAYED RELEASE ORAL at 21:44

## 2021-10-17 RX ADMIN — Medication 1 APPLICATION(S): at 12:42

## 2021-10-17 RX ADMIN — GABAPENTIN 100 MILLIGRAM(S): 400 CAPSULE ORAL at 21:44

## 2021-10-17 RX ADMIN — GABAPENTIN 100 MILLIGRAM(S): 400 CAPSULE ORAL at 13:27

## 2021-10-17 RX ADMIN — FINASTERIDE 5 MILLIGRAM(S): 5 TABLET, FILM COATED ORAL at 12:42

## 2021-10-17 RX ADMIN — Medication 25 MILLIGRAM(S): at 05:20

## 2021-10-17 NOTE — PHYSICAL THERAPY INITIAL EVALUATION ADULT - PERTINENT HX OF CURRENT PROBLEM, REHAB EVAL
pt admitted with c/o pain of right foot  s/p partial ray amputation of 5th toe on 8/19/2021. Of note: - CHEST TUBE PLACED [10/8] - FLUID CONSISTENT WITH TRANSUDATIVE PROCESS
pt admitted with c/o pain of right foot  s/p partial ray amputation of 5th toe on 8/19/2021

## 2021-10-17 NOTE — PHYSICAL THERAPY INITIAL EVALUATION ADULT - CRITERIA FOR SKILLED THERAPEUTIC INTERVENTIONS
STEVAN/impairments found/functional limitations in following categories/risk reduction/prevention/anticipated discharge recommendation
STEVAN/impairments found/functional limitations in following categories/risk reduction/prevention/anticipated discharge recommendation

## 2021-10-17 NOTE — PROGRESS NOTE ADULT - SUBJECTIVE AND OBJECTIVE BOX
Patient is seen and examined at the bed side, is afebrile. The kidney function stay normal.       REVIEW OF SYSTEMS: All other review systems are negative      ALLERGIES: No Known Allergies      Vital Signs Last 24 Hrs  T(C): 36.8 (17 Oct 2021 20:11), Max: 37.1 (17 Oct 2021 13:30)  T(F): 98.3 (17 Oct 2021 20:11), Max: 98.7 (17 Oct 2021 13:30)  HR: 69 (17 Oct 2021 20:11) (69 - 85)  BP: 153/66 (17 Oct 2021 20:11) (122/66 - 159/75)  BP(mean): --  RR: 18 (17 Oct 2021 20:11) (18 - 18)  SpO2: 97% (17 Oct 2021 20:11) (97% - 97%)      PHYSICAL EXAM:  GENERAL: Not in distress  CHEST/LUNG:  Not using accessory muscles, s/p removal of Right CT   HEART: s1 and s2 present  ABDOMEN:  Mild distended   EXTREMITIES: Right foot bandage in placed   CNS: Awake and alert       LABS:                        9.5    4.02  )-----------( 211      ( 17 Oct 2021 06:20 )             29.7                           8.7    4.37  )-----------( 187      ( 16 Oct 2021 06:33 )             26.7       10-17    138  |  106  |  17  ----------------------------<  90  4.1   |  26  |  1.05    Ca    8.8      17 Oct 2021 06:20  Phos  3.2     10-16  Mg     2.1     10-16    TPro  6.7  /  Alb  2.3<L>  /  TBili  0.8  /  DBili  x   /  AST  39  /  ALT  81<H>  /  AlkPhos  134<H>  10-17    10-16    140  |  108  |  16  ----------------------------<  103<H>  4.2   |  27  |  0.97    Ca    8.7      16 Oct 2021 04:04  Phos  3.2     10-16  Mg     2.1     10-16    TPro  6.3  /  Alb  2.1<L>  /  TBili  0.7  /  DBili  x   /  AST  44<H>  /  ALT  94<H>  /  AlkPhos  134<H>  10-16        10-13    145  |  111<H>  |  19<H>  ----------------------------<  199<H>  4.2   |  28  |  1.31<H>    Ca    8.6      13 Oct 2021 04:58  Phos  2.2     10-13  Mg     2.1     10-13    TPro  6.5  /  Alb  2.1<L>  /  TBili  0.5  /  DBili  x   /  AST  153<H>  /  ALT  227<H>  /  AlkPhos  180<H>  10-13      09-25    139  |  107  |  41<H>  ----------------------------<  134<H>  4.1   |  21<L>  |  4.05<H>    Ca    8.6      25 Sep 2021 06:40    TPro  6.6  /  Alb  2.3<L>  /  TBili  0.5  /  DBili  x   /  AST  25  /  ALT  15  /  AlkPhos  102  09-25        CAPILLARY BLOOD GLUCOSE  POCT Blood Glucose.: 134 mg/dL (17 Sep 2021 17:11)  POCT Blood Glucose.: 128 mg/dL (17 Sep 2021 11:06)  POCT Blood Glucose.: 157 mg/dL (17 Sep 2021 04:10)      Vancomycin Level, Trough (10.14.21 @ 11:32) : 21.8  Vancomycin Level, Trough (10.12.21 @ 12:13)   Vancomycin Level, Trough: 19.5:      MEDICATIONS  (STANDING):    apixaban 5 milliGRAM(s) Oral two times a day  aspirin  chewable 81 milliGRAM(s) Oral daily  atorvastatin 80 milliGRAM(s) Oral at bedtime  chlorhexidine 2% Cloths 1 Application(s) Topical <User Schedule>  collagenase Ointment 1 Application(s) Topical daily  cyanocobalamin 1000 MICROGram(s) Oral daily  ergocalciferol 28286 Unit(s) Oral <User Schedule>  ferrous    sulfate Liquid 300 milliGRAM(s) Enteral Tube daily  finasteride 5 milliGRAM(s) Oral daily  gabapentin 100 milliGRAM(s) Oral three times a day  insulin lispro (ADMELOG) corrective regimen sliding scale   SubCutaneous every 6 hours  meropenem  IVPB 1000 milliGRAM(s) IV Intermittent every 12 hours  metoprolol tartrate 25 milliGRAM(s) Oral two times a day  NIFEdipine XL 60 milliGRAM(s) Oral daily  pantoprazole  Injectable 40 milliGRAM(s) IV Push at bedtime        RADIOLOGY & ADDITIONAL TESTS:    10/5/21 CT Chest No Cont (10.05.21 @ 14:07) Pulmonary edema with bilateral moderate to large pleural effusions. Underlying bilateral lower lobe compressive atelectasis versus pneumonia.  Mildly enlarged mediastinal lymph nodes, possibly reactive.      10/2/21: Xray Chest 1 View- PORTABLE-Routine (Xray Chest 1 View- PORTABLE-Routine in AM.) (10.02.21 @ 09:46) There is persistent bilateral perihilar/basilar diffuse airspace disease and/or RIGHT effusion.  Cardiomegaly.  No interval change.    Collected Date/Time: 9/22/2021 08:42 EDT   Received Date/Time: 9/23/2021 09:29 EDT   Surgical Pathology Report - Auth (Verified)   Specimen(s) Submitted   1 Right 5th Toe Wound Debridement r/o Osteomyelitis   Final Diagnosis   Right fifth toe; wound debridement:   - Bone with acute osteomyelitis and necrotic periosteal tissue.     9/28/21: US Abdomen Upper Quadrant Right (09.28.21 @ 12:13) Cholelithiasis without evidence of acute cholecystitis. Note is made of right pleural effusion    9/27/21: CT Abdomen and Pelvis w/ Oral Cont (09.27.21 @ 15:53) >No acute intra-abdominal pathology.    Small bilateral pleural effusions with compressive atelectasis of both lower lobes.    9/26/21: Xray Chest 1 View-PORTABLE IMMEDIATE (Xray Chest 1 View-PORTABLE IMMEDIATE .) (09.26.21 @ 15:28) : Small bilateral pleural effusions and mild pulmonary edema. A right lower lobe pneumonia is not excluded.      9/26/21: Xray Abdomen 1 View Portable, IMMEDIATE (Xray Abdomen 1 View Portable, IMMEDIATE .) (09.26.21 @ 15:28) : There is a nasogastric tube with its tip in the stomach. There is gaseous distention of the colon. No pathologic calcifications are seen. The osseous structures are intact with degenerative change is present in the spine.      9/17/21 : MR Foot No Cont, Right (09.17.21 @ 13:04) : Resection at the mid aspect of the fifth metatarsal. Lateral soft tissue wound with marrow signal abnormality throughout the fifth metatarsal and within the adjacent fourth metatarsal head which is nonspecific in the setting of recent surgery although osteomyelitis is suspected.    9/16/21 : Xray Foot AP + Lateral + Oblique, Right (09.16.21 @ 15:03) : No acute finding. No plain film evidence of osteomyelitis.        MICROBIOLOGY DATA:    COVID-19 PCR (10.11.21 @ 05:44)   COVID-19 PCR: NotDetec:   Urine Microscopic-Add On (NC) (09.22.21 @ 16:14)   Red Blood Cell - Urine: 0-2 /HPF   White Blood Cell - Urine: 3-5 /HPF   Bacteria: Moderate /HPF   Comment - Urine: yeast cells present   Epithelial Cells: Moderate /HPF     Culture - Tissue with Gram Stain (09.22.21 @ 13:54)   Gram Stain: No polymorphonuclear cells seen per low power field   No organisms seen per oil power field   Specimen Source: .Tissue Right 5th toe r/o osteomyeltis   Culture Results: No growth     COVID-19 David Domain Antibody (09.18.21 @ 12:55)   COVID-19 David Domain Antibody Result: >250.00:    Culture - Blood (09.17.21 @ 10:28)   Specimen Source: .Blood Blood-Peripheral   Culture Results: No growth to date.     Culture - Blood (09.17.21 @ 10:28)   Specimen Source: .Blood Blood-Venous   Culture Results: No growth to date.     Culture - Surgical Swab (09.16.21 @ 21:42)   - Amikacin: S <=16   - Amikacin: S <=16   - Amoxicillin/Clavulanic Acid: S <=8/4   - Ampicillin: R >16 These ampicillin results predict results for amoxicillin   - Ampicillin: S <=2 Predicts results to ampicillin/sulbactam, amoxacillin-clavulanate and piperacillin-tazobactam.   - Ampicillin/Sulbactam: S 8/4 Enterobacter, Citrobacter, and Serratia may develop resistance during prolonged therapy (3-4 days)   - Ampicillin/Sulbactam: R >16/8   - Aztreonam: S <=4   - Aztreonam: S <=4   - Cefazolin: R 16 Enterobacter, Citrobacter, and Serratia may develop resistance during prolonged therapy (3-4 days)   - Cefazolin: R >16   - Cefepime: S <=2   - Cefepime: S <=2   - Cefoxitin: S <=8   - Cefoxitin: S <=8   - Ceftazidime: S <=1   - Ceftriaxone: S <=1   - Ceftriaxone: S <=1 Enterobacter, Citrobacter, and Serratia may develop resistance during prolonged therapy   - Ciprofloxacin: S <=0.25   - Ciprofloxacin: S <=0.25   - Ertapenem: S <=0.5   - Gentamicin: S <=2   - Gentamicin: S <=2   - Imipenem: S <=1   - Levofloxacin: S <=0.5   - Levofloxacin: S <=0.5   - Meropenem: S <=1   - Meropenem: S <=1   - Piperacillin/Tazobactam: S <=8   - Piperacillin/Tazobactam: S <=8   - Tetra/Doxy: S 4   - Tobramycin: S <=2   - Trimethoprim/Sulfamethoxazole: S <=0.5/9.5   - Trimethoprim/Sulfamethoxazole: S <=0.5/9.5   - Vancomycin: S 2   Specimen Source: .Surgical Swab right foot wound   Culture Results:   Culture yields >4 types of aerobic and/or anaerobic bacteria   Call client services within 7 days if further workup is clinically   indicated. Culture includes   Rare Aeromonas hydrophila/caviae   Few Klebsiella oxytoca/Raoutella ornithinolytica   Few Enterococcus faecalis   Few Streptococcus agalactiae (Group B) isolated   Group B streptococci are susceptible to ampicillin,   penicillin and cefazolin, but may be resistant to   erythromycin and clindamycin.   Recommendations for intrapartum prophylaxis for Group B   streptococci are penicillin or ampicillin.   Organism Identification: Aeromonas hydrophila/caviae   Klebsiella oxytoca /Raoutella ornithinolytica   Enterococcus faecalis   Organism: Aeromonas hydrophila/caviae   Organism: Klebsiella oxytoca /Raoutella ornithinolytica   Organism: Enterococcus faecalis   COVID-19 PCR . (09.16.21 @ 22:24)   COVID-19 PCR: NotDetec:               Patient is seen and examined at the bed side, is afebrile. He mentioned feeling better.       REVIEW OF SYSTEMS: All other review systems are negative      ALLERGIES: No Known Allergies      Vital Signs Last 24 Hrs  T(C): 36.8 (17 Oct 2021 20:11), Max: 37.1 (17 Oct 2021 13:30)  T(F): 98.3 (17 Oct 2021 20:11), Max: 98.7 (17 Oct 2021 13:30)  HR: 69 (17 Oct 2021 20:11) (69 - 85)  BP: 153/66 (17 Oct 2021 20:11) (122/66 - 159/75)  BP(mean): --  RR: 18 (17 Oct 2021 20:11) (18 - 18)  SpO2: 97% (17 Oct 2021 20:11) (97% - 97%)      PHYSICAL EXAM:  GENERAL: Not in distress  CHEST/LUNG:  Not using accessory muscles, s/p removal of Right CT   HEART: s1 and s2 present  ABDOMEN:  Mild distended   EXTREMITIES: Right foot bandage in placed   CNS: Awake and alert       LABS:                        9.5    4.02  )-----------( 211      ( 17 Oct 2021 06:20 )             29.7                           8.7    4.37  )-----------( 187      ( 16 Oct 2021 06:33 )             26.7       10-17    138  |  106  |  17  ----------------------------<  90  4.1   |  26  |  1.05    Ca    8.8      17 Oct 2021 06:20  Phos  3.2     10-16  Mg     2.1     10-16    TPro  6.7  /  Alb  2.3<L>  /  TBili  0.8  /  DBili  x   /  AST  39  /  ALT  81<H>  /  AlkPhos  134<H>  10-17    10-16    140  |  108  |  16  ----------------------------<  103<H>  4.2   |  27  |  0.97    Ca    8.7      16 Oct 2021 04:04  Phos  3.2     10-16  Mg     2.1     10-16    TPro  6.3  /  Alb  2.1<L>  /  TBili  0.7  /  DBili  x   /  AST  44<H>  /  ALT  94<H>  /  AlkPhos  134<H>  10-16        10-13    145  |  111<H>  |  19<H>  ----------------------------<  199<H>  4.2   |  28  |  1.31<H>    Ca    8.6      13 Oct 2021 04:58  Phos  2.2     10-13  Mg     2.1     10-13    TPro  6.5  /  Alb  2.1<L>  /  TBili  0.5  /  DBili  x   /  AST  153<H>  /  ALT  227<H>  /  AlkPhos  180<H>  10-13      09-25    139  |  107  |  41<H>  ----------------------------<  134<H>  4.1   |  21<L>  |  4.05<H>    Ca    8.6      25 Sep 2021 06:40    TPro  6.6  /  Alb  2.3<L>  /  TBili  0.5  /  DBili  x   /  AST  25  /  ALT  15  /  AlkPhos  102  09-25        CAPILLARY BLOOD GLUCOSE  POCT Blood Glucose.: 134 mg/dL (17 Sep 2021 17:11)  POCT Blood Glucose.: 128 mg/dL (17 Sep 2021 11:06)  POCT Blood Glucose.: 157 mg/dL (17 Sep 2021 04:10)      Vancomycin Level, Trough (10.14.21 @ 11:32) : 21.8  Vancomycin Level, Trough (10.12.21 @ 12:13)   Vancomycin Level, Trough: 19.5:      MEDICATIONS  (STANDING):    apixaban 5 milliGRAM(s) Oral two times a day  aspirin  chewable 81 milliGRAM(s) Oral daily  atorvastatin 80 milliGRAM(s) Oral at bedtime  chlorhexidine 2% Cloths 1 Application(s) Topical <User Schedule>  collagenase Ointment 1 Application(s) Topical daily  cyanocobalamin 1000 MICROGram(s) Oral daily  ergocalciferol 29117 Unit(s) Oral <User Schedule>  ferrous    sulfate Liquid 300 milliGRAM(s) Enteral Tube daily  finasteride 5 milliGRAM(s) Oral daily  gabapentin 100 milliGRAM(s) Oral three times a day  insulin lispro (ADMELOG) corrective regimen sliding scale   SubCutaneous every 6 hours  meropenem  IVPB 1000 milliGRAM(s) IV Intermittent every 12 hours  metoprolol tartrate 25 milliGRAM(s) Oral two times a day  NIFEdipine XL 60 milliGRAM(s) Oral daily  pantoprazole  Injectable 40 milliGRAM(s) IV Push at bedtime        RADIOLOGY & ADDITIONAL TESTS:    10/5/21 CT Chest No Cont (10.05.21 @ 14:07) Pulmonary edema with bilateral moderate to large pleural effusions. Underlying bilateral lower lobe compressive atelectasis versus pneumonia.  Mildly enlarged mediastinal lymph nodes, possibly reactive.      10/2/21: Xray Chest 1 View- PORTABLE-Routine (Xray Chest 1 View- PORTABLE-Routine in AM.) (10.02.21 @ 09:46) There is persistent bilateral perihilar/basilar diffuse airspace disease and/or RIGHT effusion.  Cardiomegaly.  No interval change.    Collected Date/Time: 9/22/2021 08:42 EDT   Received Date/Time: 9/23/2021 09:29 EDT   Surgical Pathology Report - Auth (Verified)   Specimen(s) Submitted   1 Right 5th Toe Wound Debridement r/o Osteomyelitis   Final Diagnosis   Right fifth toe; wound debridement:   - Bone with acute osteomyelitis and necrotic periosteal tissue.     9/28/21: US Abdomen Upper Quadrant Right (09.28.21 @ 12:13) Cholelithiasis without evidence of acute cholecystitis. Note is made of right pleural effusion    9/27/21: CT Abdomen and Pelvis w/ Oral Cont (09.27.21 @ 15:53) >No acute intra-abdominal pathology.    Small bilateral pleural effusions with compressive atelectasis of both lower lobes.    9/26/21: Xray Chest 1 View-PORTABLE IMMEDIATE (Xray Chest 1 View-PORTABLE IMMEDIATE .) (09.26.21 @ 15:28) : Small bilateral pleural effusions and mild pulmonary edema. A right lower lobe pneumonia is not excluded.      9/26/21: Xray Abdomen 1 View Portable, IMMEDIATE (Xray Abdomen 1 View Portable, IMMEDIATE .) (09.26.21 @ 15:28) : There is a nasogastric tube with its tip in the stomach. There is gaseous distention of the colon. No pathologic calcifications are seen. The osseous structures are intact with degenerative change is present in the spine.      9/17/21 : MR Foot No Cont, Right (09.17.21 @ 13:04) : Resection at the mid aspect of the fifth metatarsal. Lateral soft tissue wound with marrow signal abnormality throughout the fifth metatarsal and within the adjacent fourth metatarsal head which is nonspecific in the setting of recent surgery although osteomyelitis is suspected.    9/16/21 : Xray Foot AP + Lateral + Oblique, Right (09.16.21 @ 15:03) : No acute finding. No plain film evidence of osteomyelitis.        MICROBIOLOGY DATA:    COVID-19 PCR (10.11.21 @ 05:44)   COVID-19 PCR: NotDetec:   Urine Microscopic-Add On (NC) (09.22.21 @ 16:14)   Red Blood Cell - Urine: 0-2 /HPF   White Blood Cell - Urine: 3-5 /HPF   Bacteria: Moderate /HPF   Comment - Urine: yeast cells present   Epithelial Cells: Moderate /HPF     Culture - Tissue with Gram Stain (09.22.21 @ 13:54)   Gram Stain: No polymorphonuclear cells seen per low power field   No organisms seen per oil power field   Specimen Source: .Tissue Right 5th toe r/o osteomyeltis   Culture Results: No growth     COVID-19 David Domain Antibody (09.18.21 @ 12:55)   COVID-19 David Domain Antibody Result: >250.00:    Culture - Blood (09.17.21 @ 10:28)   Specimen Source: .Blood Blood-Peripheral   Culture Results: No growth to date.     Culture - Blood (09.17.21 @ 10:28)   Specimen Source: .Blood Blood-Venous   Culture Results: No growth to date.     Culture - Surgical Swab (09.16.21 @ 21:42)   - Amikacin: S <=16   - Amikacin: S <=16   - Amoxicillin/Clavulanic Acid: S <=8/4   - Ampicillin: R >16 These ampicillin results predict results for amoxicillin   - Ampicillin: S <=2 Predicts results to ampicillin/sulbactam, amoxacillin-clavulanate and piperacillin-tazobactam.   - Ampicillin/Sulbactam: S 8/4 Enterobacter, Citrobacter, and Serratia may develop resistance during prolonged therapy (3-4 days)   - Ampicillin/Sulbactam: R >16/8   - Aztreonam: S <=4   - Aztreonam: S <=4   - Cefazolin: R 16 Enterobacter, Citrobacter, and Serratia may develop resistance during prolonged therapy (3-4 days)   - Cefazolin: R >16   - Cefepime: S <=2   - Cefepime: S <=2   - Cefoxitin: S <=8   - Cefoxitin: S <=8   - Ceftazidime: S <=1   - Ceftriaxone: S <=1   - Ceftriaxone: S <=1 Enterobacter, Citrobacter, and Serratia may develop resistance during prolonged therapy   - Ciprofloxacin: S <=0.25   - Ciprofloxacin: S <=0.25   - Ertapenem: S <=0.5   - Gentamicin: S <=2   - Gentamicin: S <=2   - Imipenem: S <=1   - Levofloxacin: S <=0.5   - Levofloxacin: S <=0.5   - Meropenem: S <=1   - Meropenem: S <=1   - Piperacillin/Tazobactam: S <=8   - Piperacillin/Tazobactam: S <=8   - Tetra/Doxy: S 4   - Tobramycin: S <=2   - Trimethoprim/Sulfamethoxazole: S <=0.5/9.5   - Trimethoprim/Sulfamethoxazole: S <=0.5/9.5   - Vancomycin: S 2   Specimen Source: .Surgical Swab right foot wound   Culture Results:   Culture yields >4 types of aerobic and/or anaerobic bacteria   Call client services within 7 days if further workup is clinically   indicated. Culture includes   Rare Aeromonas hydrophila/caviae   Few Klebsiella oxytoca/Raoutella ornithinolytica   Few Enterococcus faecalis   Few Streptococcus agalactiae (Group B) isolated   Group B streptococci are susceptible to ampicillin,   penicillin and cefazolin, but may be resistant to   erythromycin and clindamycin.   Recommendations for intrapartum prophylaxis for Group B   streptococci are penicillin or ampicillin.   Organism Identification: Aeromonas hydrophila/caviae   Klebsiella oxytoca /Raoutella ornithinolytica   Enterococcus faecalis   Organism: Aeromonas hydrophila/caviae   Organism: Klebsiella oxytoca /Raoutella ornithinolytica   Organism: Enterococcus faecalis   COVID-19 PCR . (09.16.21 @ 22:24)   COVID-19 PCR: NotDetec:

## 2021-10-17 NOTE — PROGRESS NOTE ADULT - SUBJECTIVE AND OBJECTIVE BOX
C A R D I O L O G Y  **********************************    DATE OF SERVICE: 10-17-21         apixaban 5 milliGRAM(s) Oral two times a day  aspirin  chewable 81 milliGRAM(s) Oral daily  atorvastatin 80 milliGRAM(s) Oral at bedtime  chlorhexidine 2% Cloths 1 Application(s) Topical <User Schedule>  collagenase Ointment 1 Application(s) Topical daily  cyanocobalamin 1000 MICROGram(s) Oral daily  ergocalciferol 76208 Unit(s) Oral <User Schedule>  ferrous    sulfate Liquid 300 milliGRAM(s) Enteral Tube daily  finasteride 5 milliGRAM(s) Oral daily  gabapentin 100 milliGRAM(s) Oral three times a day  insulin lispro (ADMELOG) corrective regimen sliding scale   SubCutaneous every 6 hours  meropenem  IVPB 1000 milliGRAM(s) IV Intermittent every 12 hours  metoprolol tartrate 25 milliGRAM(s) Oral two times a day  NIFEdipine XL 60 milliGRAM(s) Oral daily  ondansetron Injectable 4 milliGRAM(s) IV Push three times a day PRN  pantoprazole  Injectable 40 milliGRAM(s) IV Push at bedtime  sodium chloride 0.9% lock flush 10 milliLiter(s) IV Push every 1 hour PRN                            9.5    4.02  )-----------( 211      ( 17 Oct 2021 06:20 )             29.7       Hemoglobin: 9.5 g/dL (10-17 @ 06:20)  Hemoglobin: 8.7 g/dL (10-16 @ 06:33)  Hemoglobin: 8.4 g/dL (10-16 @ 04:04)  Hemoglobin: 8.4 g/dL (10-15 @ 06:39)  Hemoglobin: 8.2 g/dL (10-15 @ 03:57)      10-17    138  |  106  |  17  ----------------------------<  90  4.1   |  26  |  1.05    Ca    8.8      17 Oct 2021 06:20  Phos  3.2     10-16  Mg     2.1     10-16    TPro  6.7  /  Alb  2.3<L>  /  TBili  0.8  /  DBili  x   /  AST  39  /  ALT  81<H>  /  AlkPhos  134<H>  10-17    Creatinine Trend: 1.05<--, 0.97<--, 0.99<--, 0.99<--, 1.31<--, 1.43<--    COAGS:           T(C): 36.9 (10-17-21 @ 05:34), Max: 36.9 (10-17-21 @ 05:34)  HR: 85 (10-17-21 @ 05:34) (74 - 89)  BP: 159/75 (10-17-21 @ 05:34) (124/57 - 159/75)  RR: 18 (10-17-21 @ 05:34) (18 - 19)  SpO2: 97% (10-17-21 @ 05:34) (96% - 97%)  Wt(kg): --    I&O's Summary    16 Oct 2021 07:01  -  17 Oct 2021 07:00  --------------------------------------------------------  IN: 0 mL / OUT: 1300 mL / NET: -1300 mL      HEENT:  (-)icterus (-)pallor  CV: N S1 S2 1/6 [place] (+)2 Pulses B/l  Resp:  Coarse sounds B/L, normal effort  GI: (+) BS Soft, NT, ND  Lymph:  (-)Edema, (-)obvious lymphadenopathy  Skin: Warm to touch, Normal turgor  Psych: Unable to adequately  mood and affect    Tele: SR      ASSESSMENT/PLAN: 	76y  Male PMH DM on insulin, HTN, HLD, normal lV fx moderate to severe AS 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    - A/C with eliquis for PAF. hr stable   - Abx per primary team  - wound care per medicine   - Echo noted, Severe AS, EF 40-45%   - He will need a SABIHA and R+L heart cath when he recovers and has no active infection  - Cont medical management of CAD  - cont supportive care

## 2021-10-17 NOTE — PHYSICAL THERAPY INITIAL EVALUATION ADULT - IMPAIRMENTS FOUND, PT EVAL
gait, locomotion, and balance/muscle strength
arousal, attention, and cognition/cognitive impairment/gait, locomotion, and balance/gross motor/muscle strength

## 2021-10-17 NOTE — PHYSICAL THERAPY INITIAL EVALUATION ADULT - DIAGNOSIS, PT EVAL
difficulty with bed mobility, transfers and ambulation due to c/o increase right foot pain with mobility and difficulty maintaining NWB prec of (R)NICKIE -RN aware
difficulty with bed mobility, transfers and ambulation due to c/o increase right foot pain with mobility and difficulty maintaining NWB prec of (R)NICKIE -RN aware

## 2021-10-17 NOTE — PHYSICAL THERAPY INITIAL EVALUATION ADULT - AMBULATION SKILLS, REHAB EVAL
RW, transport chair-very short distance/needs device and assist
RW, transport chair-very short distance/needs device and assist

## 2021-10-17 NOTE — PROGRESS NOTE ADULT - SUBJECTIVE AND OBJECTIVE BOX
Patient is a 76y old  Male who presents with a chief complaint of R Foot Pain (17 Oct 2021 07:05)    PATIENT IS SEEN AND EXAMINED IN MEDICAL FLOOR.    KEENANT [    ]    MADDY [   ]      GT [   ]    ALLERGIES:  No Known Allergies      Daily     Daily     VITALS:    Vital Signs Last 24 Hrs  T(C): 37.1 (17 Oct 2021 13:30), Max: 37.1 (17 Oct 2021 13:30)  T(F): 98.7 (17 Oct 2021 13:30), Max: 98.7 (17 Oct 2021 13:30)  HR: 77 (17 Oct 2021 17:25) (74 - 85)  BP: 146/70 (17 Oct 2021 17:25) (122/66 - 159/75)  BP(mean): --  RR: 18 (17 Oct 2021 13:30) (18 - 18)  SpO2: 97% (17 Oct 2021 13:30) (97% - 97%)    LABS:    CBC Full  -  ( 17 Oct 2021 06:20 )  WBC Count : 4.02 K/uL  RBC Count : 3.24 M/uL  Hemoglobin : 9.5 g/dL  Hematocrit : 29.7 %  Platelet Count - Automated : 211 K/uL  Mean Cell Volume : 91.7 fl  Mean Cell Hemoglobin : 29.3 pg  Mean Cell Hemoglobin Concentration : 32.0 gm/dL  Auto Neutrophil # : x  Auto Lymphocyte # : x  Auto Monocyte # : x  Auto Eosinophil # : x  Auto Basophil # : x  Auto Neutrophil % : x  Auto Lymphocyte % : x  Auto Monocyte % : x  Auto Eosinophil % : x  Auto Basophil % : x      10-17    138  |  106  |  17  ----------------------------<  90  4.1   |  26  |  1.05    Ca    8.8      17 Oct 2021 06:20  Phos  3.2     10-16  Mg     2.1     10-16    TPro  6.7  /  Alb  2.3<L>  /  TBili  0.8  /  DBili  x   /  AST  39  /  ALT  81<H>  /  AlkPhos  134<H>  10-17    CAPILLARY BLOOD GLUCOSE      POCT Blood Glucose.: 126 mg/dL (17 Oct 2021 17:22)  POCT Blood Glucose.: 85 mg/dL (17 Oct 2021 12:20)  POCT Blood Glucose.: 85 mg/dL (17 Oct 2021 08:12)  POCT Blood Glucose.: 98 mg/dL (17 Oct 2021 01:37)  POCT Blood Glucose.: 94 mg/dL (16 Oct 2021 21:27)        LIVER FUNCTIONS - ( 17 Oct 2021 06:20 )  Alb: 2.3 g/dL / Pro: 6.7 g/dL / ALK PHOS: 134 U/L / ALT: 81 U/L DA / AST: 39 U/L / GGT: x           Creatinine Trend: 1.05<--, 0.97<--, 0.99<--, 0.99<--, 1.31<--, 1.43<--  I&O's Summary    16 Oct 2021 07:01  -  17 Oct 2021 07:00  --------------------------------------------------------  IN: 0 mL / OUT: 1300 mL / NET: -1300 mL            .Body Fluid Pleural Fluid  10-08 @ 18:51   No growth at 1 week.  --  --      .Body Fluid Pleural Fluid  10-08 @ 18:34   No growth at 5 days  --    polymorphonuclear leukocytes seen  No organisms seen  by cytocentrifuge      .Tissue Right 5th toe r/o osteomyeltis  09-22 @ 13:54   No growth at 5 days  --    No polymorphonuclear cells seen per low power field  No organisms seen per oil power field      .Blood Blood-Venous  09-17 @ 10:28   No Growth Final  --  --      .Surgical Swab right foot wound  09-16 @ 21:42   Culture yields >4 types of aerobic and/or anaerobic bacteria  Call client services within 7 days if further workup is clinically  indicated. Culture includes  Rare Aeromonas hydrophila/caviae  Few Klebsiella oxytoca/Raoutella ornithinolytica  Few Enterococcus faecalis  Few Streptococcus agalactiae (Group B) isolated  Group B streptococci are susceptible to ampicillin,  penicillin and cefazolin, but may be resistant to  erythromycin and clindamycin.  Recommendations for intrapartum prophylaxis for Group B  streptococci are penicillin or ampicillin.  --  Aeromonas hydrophila/caviae  Klebsiella oxytoca /Raoutella ornithinolytica  Enterococcus faecalis      Bone R 5th metatarsal bone clean ma  08-20 @ 03:59   No growth at 5 days  --    No polymorphonuclear leukocytes seen per low power field  No organisms seen per oil power field      .Blood Blood-Venous  08-14 @ 21:09   No Growth Final  --  --      .Surgical Swab Right foot plantar wound  08-14 @ 21:08   Moderate Streptococcus agalactiae (Group B) isolated  Group B streptococci are susceptible to ampicillin,  penicillin and cefazolin, but may be resistant to  erythromycin and clindamycin.  Recommendations for intrapartum prophylaxis for Group B  streptococci are penicillin or ampicillin.  Moderate Bacteroides fragilis "Susceptibilities not performed"  Normal skin filiberto isolated  --  --          MEDICATIONS:    MEDICATIONS  (STANDING):  apixaban 5 milliGRAM(s) Oral two times a day  aspirin  chewable 81 milliGRAM(s) Oral daily  atorvastatin 80 milliGRAM(s) Oral at bedtime  chlorhexidine 2% Cloths 1 Application(s) Topical <User Schedule>  collagenase Ointment 1 Application(s) Topical daily  cyanocobalamin 1000 MICROGram(s) Oral daily  ergocalciferol 86262 Unit(s) Oral <User Schedule>  ferrous    sulfate Liquid 300 milliGRAM(s) Enteral Tube daily  finasteride 5 milliGRAM(s) Oral daily  gabapentin 100 milliGRAM(s) Oral three times a day  insulin lispro (ADMELOG) corrective regimen sliding scale   SubCutaneous every 6 hours  meropenem  IVPB 1000 milliGRAM(s) IV Intermittent every 12 hours  metoprolol tartrate 25 milliGRAM(s) Oral two times a day  NIFEdipine XL 60 milliGRAM(s) Oral daily  pantoprazole  Injectable 40 milliGRAM(s) IV Push at bedtime      MEDICATIONS  (PRN):  ondansetron Injectable 4 milliGRAM(s) IV Push three times a day PRN Nausea and/or Vomiting  sodium chloride 0.9% lock flush 10 milliLiter(s) IV Push every 1 hour PRN Pre/post blood products, medications, blood draw, and to maintain line patency        REVIEW OF SYSTEMS:                           ALL ROS DONE [ X   ]      CONSTITUTIONAL:  LETHARGIC [   ], FEVER [   ], UNRESPONSIVE [   ]  CVS:  CP  [   ], SOB, [   ], PALPITATIONS [   ], DIZZYNESS [   ]  RS: COUGH [   ], SPUTUM [   ]  GI: ABDOMINAL PAIN [   ], NAUSEA [   ], VOMITINGS [   ], DIARRHEA [   ], CONSTIPATION [   ]  :  DYSURIA [   ], NOCTURIA [   ], INCREASED FREQUENCY [   ], DRIBLING [   ],  SKELETAL: PAINFUL JOINTS [   ], SWOLLEN JOINTS [   ], NECK ACHE [   ], LOW BACK ACHE [   ],  SKIN : ULCERS [   ], RASH [   ], ITCHING [   ]  CNS: HEAD ACHE [   ], DOUBLE VISION [   ], BLURRED VISION [   ], AMS / CONFUSION [   ], SEIZURES [   ], WEAKNESS [   ],TINGLING / NUMBNESS [   ]      PHYSICAL EXAMINATION:    GENERAL APPEARANCE: NO DISTRESS  HEENT:  NO PALLOR, NO  JVD,  NO   NODES, NECK SUPPLE  CVS: S1 +, S2 +,   RS: AEEB,  OCCASIONAL  RALES +,  RONCHI +, CRACKLES +     ABD: SOFT, NT, NO, BS +       EXT: NO PE  SKIN: WARM,   RIGHT FOOT WRAPPED, RIGHT CHEST TUBE SITE W/ BANDAGES  SKELETAL:  ROM ACCEPTABLE  CNS:  AAO X  2-3        RADIOLOGY :    < from: Transthoracic Echocardiogram (10.07.21 @ 15:26) >  CONCLUSIONS:  1. Normal mitral valve. Moderate mitral regurgitation.  2. Calcified aortic valve with decreased opening, cannot  exclude bicuspid. Peak transaortic valve gradient equals  32.4 mm Hg, mean transaortic valve gradient equals 19 mm  Hg, dimensionless index 0.22, estimated aortic valve area  equals 0.6sqcm (by continuity equation), consistent with  paradoxical low-flow low-gradient severe aortic stenosis.  Consider dobutamine stress echocardiogram and/or SABIHA for  further evaluation.  No aortic valve regurgitation seen.  3. Normal aortic root.  4. Normal left atrium.  5. Moderate concentric left ventricular hypertrophy.  6. Moderate global left ventricular systolic dysfunction  (EF 40-45% by visual estimation).  7. Grade II diastolic dysfunction (moderate).  8. Normal right atrium.  9. Normal right ventricular size with decreased RV systolic  function (TAPSE 1.2 cm).  10. RV systolic pressure is borderline normal at  34 mm Hg.  11. Tricuspid valve not well seen. Trace tricuspid  regurgitation.  12. Normal pulmonic valve. Trace pulmonic insufficiency is  noted.  13. No pericardial effusion.  14. Bilateral pleural effusions.    < end of copied text >      EXAM:  CT ABDOMEN AND PELVIS OC                            PROCEDURE DATE:  09/27/2021          INTERPRETATION:  CLINICAL INFORMATION: Small bowel obstruction.    COMPARISON: None.    CONTRAST/COMPLICATIONS:  IV Contrast: NONE  Oral Contrast: Omnipaque 300  Complications: None reported at time of study completion    PROCEDURE:  CT of the Abdomen and Pelvis was performed.  Sagittal and coronal reformats were performed.    FINDINGS:  LOWER CHEST: Small bilateral pleural effusions with compressiveatelectasis of both lower lobes.    LIVER: Within normal limits.  BILE DUCTS: Normal caliber.  GALLBLADDER: Distended. Small layering gallstones.  SPLEEN: Within normal limits.  PANCREAS: Within normal limits.  ADRENALS: Within normal limits.  KIDNEYS/URETERS: No hydronephrosis. Nonobstructing left upper pole calculus, measuring 0.6 cm.    BLADDER: Urinary bladder contains air and a Castañeda catheter balloon.  REPRODUCTIVE ORGANS: Prostate is not enlarged.    BOWEL: No bowel obstruction. Appendix isnormal. Colonic diverticulosis.  PERITONEUM: Mild presacral fluid.  VESSELS: Atherosclerotic changes.  RETROPERITONEUM/LYMPH NODES: No lymphadenopathy.  ABDOMINAL WALL: Within normal limits.  BONES: Degenerative changes. Sternotomy.    IMPRESSION:  No acute intra-abdominal pathology.    Small bilateral pleural effusions with compressive atelectasis of both lower lobes.    ====================================================    EXAM:  MR FOOT RT                            PROCEDURE DATE:  09/17/2021          INTERPRETATION:  Clinical Information: Recent fifth metatarsal head resection now with fifth digit pain, swelling and foul discharge.    Comparison: Radiographs of the right foot from 9/16/2021 and MRI the right foot from 8/16/2021.    Technique:  MRI of the right midfoot and forefoot.  Intravenous Contrast: None.    Findings:    There is a resection at the mid aspect of the fifth metatarsal shaft. There is a lateral soft tissue wound beginning at the level of the amputation which extends distally. There is susceptibility artifact in the region of the amputation consistent with postoperative change. There is hyperintense T2 marrow signal throughout the remaining fifth metatarsal and within the adjacent fourth metatarsal head which is nonspecific and could be related to recent postoperative changes although osteomyelitis is suspected.    There is edema and mild atrophy within the plantar muscles of the foot. There is minimal spurring at the first metatarsophalangeal and hallux sesamoid articulations.    Impression:  Resection at the mid aspect of the fifth metatarsal. Lateral soft tissue wound with marrow signal abnormality throughout the fifth metatarsal and within the adjacent fourth metatarsal head which is nonspecific in the setting of recent surgery although osteomyelitis is suspected.        ASSESSMENT :     Headache    HTN (hypertension)    HLD (hyperlipidemia)    DM (diabetes mellitus)    CAD (coronary artery disease)    Glaucoma, angle-closure    [place] (hard of hearing)      S/P CABG (coronary artery bypass graft)    History of thyroid surgery        PLAN:    HPI:  76M from home, lives with daughter w/ PMH DM on insulin, HTN, HLD, CAD, 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 Pt with sharp pain on the R lateral foot. As per daughter, pt was taking tylenol which did not help his pain prompting the patient to ask his daughter to take him to the hospital. Pt is a poor historian. Daughter states that the patient did not see his podiatrist after the surgery. No fever, chest, pain, palpitations, nausea, vomiting, diarrhea (17 Sep 2021 01:11)    # TRANSFERRED TO ICU FOR WORSENING HYPOXIA AND DYSPNEA    # COVID EXPOSURE ON 10/13 - ON CONTACT AND DROPLET ISOLATION PRECAUTION; F/U COVID PCR    # SUSPECT RIGHT FOOT OSTEOMYELITIS S/P RIGHT 5TH RAY RESECTION [8/19] AND S/P DEBRIDEMENT, GRAFT APPLICATION, BONE BX AND WOUND VAC APPLICATION 9/22 - BONE BX W/ ACUTE OSTEOMYELITIS AND NECROTIC PERIOSTEAL TISSUE  ** RECENT ADMISSION FOR RIGHT DIABETIC FOOT ULCER, CELLULITIS, OSTEOMYELITIS RIGHT 5th METATARSAL S/P RIGHT 5TH PARTIAL RAY AMPUTATION [8/20] - BONE PATHOLOGY W/ CLEAN MARGINS    - S/P VANCOMYCIN AND ZOSYN ; NOW ON UNASYN AND LEVAQUIN GIVEN OREN; PER ID SWITCH TO ZOSYN UNTIL 11/3 [NOW ON VANC + MEROPENEM]  - RECENTLY HAD SIMON W/ MILD PAD  - REVIEWED WOUND CX [ ENTEROCOCCUS, AEROMONAS, KLEBSIELLA] AND BCX  - ID CONSULT, PODIATRY CONSULT    - PICC LINE PLACED TO COMPLETE 6 WEEKS OF ANTIBIOTICS - PER ID SWITCH TO ZOSYN UNTIL 11/3 ON D/C    # ACUTE HYPOXIC RESPIRATORY FAILURE DUE TO ACUTE ON CHRONIC SYSTOLIC CHF  (  LV EF 40- 45 % ) , DIASTOLIC LV DYSFUNCTION , ,  SEVERE AORTIC STENOSIS & MODERATE MITRAL REGURGITATION  &  ASPIRATION PNA;  - S/P CHEST TUBE INSERTION AND REMOVAL  , S/P LASIX ,   CARDIOLOGY CONSULT IN PROGRESS      - CHEST TUBE PLACED [10/8] - FLUID CONSISTENT WITH TRANSUDATIVE PROCESS  - S/P EXTUBATION 10/13  - S/P CHEST TUBE REMOVAL 10/15      # SEPTIC SHOCK - ? S/T HYPOVOLEMIA VS. SEPSIS - S/P CVC [SWITCHED FROM FEMORAL TO LEFT IJ], S/P VASOPRESSORS - RESOLING  - BCX - NGTD      # TRANSIENT NEW ONSET A.FIB, PAROXYSMAL - ON ELIQUIS, CARDIOLOGY CONSULT IN PROGRESS    # FUNGURIA - D/C FLUCONAZOLE GIVEN TRANSAMINITIS    # TRANSAMINITIS - SUSPECT THIS IS ISCHEMIC HEPATITIS - IMPROVING - HEPATOLOGY CONSULT IN PROGRESS    # BOWEL DISTENTION - ? ILEUS - OPTIMIZING ELECTROLYTES - IMPROVED  - SURGERY CONSULT IN PROGRESS    # CHOLELITHIASIS - TRENDING LFTS, RUQ U/S - CHOLELITHIASIS, SURGERY CONSULT IN PROGRESS  - GI CONSULT IN PROGRESS - LESS SUSPICIOUS FOR CHOLECYSTITIS    # DYSPEPSIA - PLACED ON PPI BID WITH IMPROVEMENT, UNDERWENT ST EVAL     # ELEVATED TROPONINS - NSTEMI - ? DEMAND - WILL NEED ISCHEMIC EVAL ONCE ACUTE INFECTION RESOLVES PER CARDIOLOGY, CARDIOLOGY CONSULT IN PROGRESS  - ON ASA, STATIN, BB    # OREN ON CKD - S/T ATN AND URINARY RETENTION - S/P IVF, NOW W/ CASTAÑEDA, NEPHROLOGY CONUSLT IN PROGRESS  -  BPH ON FLOMAX, AND FINASTERIDE  - FAILED TOV WITH WORSENING RENAL FXN - NOTED URINARY RETENTION [10/4] - REPLACED CASTAÑEDA    # MEDICAL CLEARANCE FOR SURGERY - RCRI - 2 - 10.1 % 30 DAY RISK OF DEATH, MI OR CARDIAC ARREST  - CARDIOLOGY CONSULT     # DM -  HBA1C - 11  - LANTUS, SSI + FS    # CAD S/P CABG - PLACED ON ASA, STATIN, BB  - ECHO - SEVERE AORTIC STENOSIS, SEVERE CONCENTRIC LVH, G1DD, MILD OH  - CARDIOLOGY CONSULT - DR. BASSETT   - MAY NEED ISCHEMIC EVAL ONCE ACUTE ILLNESS RESOLVES INCLUDING CARDIAC CATHETERIZATION    # HTN, HLD  - ON METOPROLOL, NICARDIPINE, STATIN       #  IMPAIRED GAIT DUE TO CERVICAL & LS SPONDYLOSIS, POLY ARTHRITIS AND DIABETIC PERIPHERAL NEUROPATHY, OP VITAMIN D DEFICIENCY - ON CHOLECALCIFEROL, OBTAIN PT & OT   #  FAILURE TO THRIVE , MODERATE PROTEIN CALORIE MALNUTRITION       # CASE DISCUSSED AT LENGTH WITH PATIENT AND DAUGHTER, BIRDIE ROBERT AT BEDSIDE AND VIA PHONE @ [phone] [10/5] - CASE DICUSSED AT LENGTH AND ALL QUESTIONS WERE ANSWERED. DAUGHTER UNDERSTOOD THAT PROGNOSIS IS GUARDED.  # PATIENT AND DAUGHTER REFUSED [facility] ON PREVIOUS ADMISSION, PREVIOUSLY WISHED FOR PATIENT RETURN HOME W/ HOME PT; HOWEVER NOW, DAUGHTER WISHES FOR PATIENT TO GO TO [facility], AS PATIENT'S WIFE IS CURRENTLY ADMITTED THERE    # GI AND DVT PPX    DR. JAELYN MUNSON Patient is a 76y old  Male who presents with a chief complaint of R Foot Pain (17 Oct 2021 07:05)    PATIENT IS SEEN AND EXAMINED IN MEDICAL FLOOR.    KEENANT [    ]    MADDY [   ]      GT [   ]    ALLERGIES:  No Known Allergies      Daily     Daily     VITALS:    Vital Signs Last 24 Hrs  T(C): 37.1 (17 Oct 2021 13:30), Max: 37.1 (17 Oct 2021 13:30)  T(F): 98.7 (17 Oct 2021 13:30), Max: 98.7 (17 Oct 2021 13:30)  HR: 77 (17 Oct 2021 17:25) (74 - 85)  BP: 146/70 (17 Oct 2021 17:25) (122/66 - 159/75)  BP(mean): --  RR: 18 (17 Oct 2021 13:30) (18 - 18)  SpO2: 97% (17 Oct 2021 13:30) (97% - 97%)    LABS:    CBC Full  -  ( 17 Oct 2021 06:20 )  WBC Count : 4.02 K/uL  RBC Count : 3.24 M/uL  Hemoglobin : 9.5 g/dL  Hematocrit : 29.7 %  Platelet Count - Automated : 211 K/uL  Mean Cell Volume : 91.7 fl  Mean Cell Hemoglobin : 29.3 pg  Mean Cell Hemoglobin Concentration : 32.0 gm/dL  Auto Neutrophil # : x  Auto Lymphocyte # : x  Auto Monocyte # : x  Auto Eosinophil # : x  Auto Basophil # : x  Auto Neutrophil % : x  Auto Lymphocyte % : x  Auto Monocyte % : x  Auto Eosinophil % : x  Auto Basophil % : x      10-17    138  |  106  |  17  ----------------------------<  90  4.1   |  26  |  1.05    Ca    8.8      17 Oct 2021 06:20  Phos  3.2     10-16  Mg     2.1     10-16    TPro  6.7  /  Alb  2.3<L>  /  TBili  0.8  /  DBili  x   /  AST  39  /  ALT  81<H>  /  AlkPhos  134<H>  10-17    CAPILLARY BLOOD GLUCOSE      POCT Blood Glucose.: 126 mg/dL (17 Oct 2021 17:22)  POCT Blood Glucose.: 85 mg/dL (17 Oct 2021 12:20)  POCT Blood Glucose.: 85 mg/dL (17 Oct 2021 08:12)  POCT Blood Glucose.: 98 mg/dL (17 Oct 2021 01:37)  POCT Blood Glucose.: 94 mg/dL (16 Oct 2021 21:27)        LIVER FUNCTIONS - ( 17 Oct 2021 06:20 )  Alb: 2.3 g/dL / Pro: 6.7 g/dL / ALK PHOS: 134 U/L / ALT: 81 U/L DA / AST: 39 U/L / GGT: x           Creatinine Trend: 1.05<--, 0.97<--, 0.99<--, 0.99<--, 1.31<--, 1.43<--  I&O's Summary    16 Oct 2021 07:01  -  17 Oct 2021 07:00  --------------------------------------------------------  IN: 0 mL / OUT: 1300 mL / NET: -1300 mL            .Body Fluid Pleural Fluid  10-08 @ 18:51   No growth at 1 week.  --  --      .Body Fluid Pleural Fluid  10-08 @ 18:34   No growth at 5 days  --    polymorphonuclear leukocytes seen  No organisms seen  by cytocentrifuge      .Tissue Right 5th toe r/o osteomyeltis  09-22 @ 13:54   No growth at 5 days  --    No polymorphonuclear cells seen per low power field  No organisms seen per oil power field      .Blood Blood-Venous  09-17 @ 10:28   No Growth Final  --  --      .Surgical Swab right foot wound  09-16 @ 21:42   Culture yields >4 types of aerobic and/or anaerobic bacteria  Call client services within 7 days if further workup is clinically  indicated. Culture includes  Rare Aeromonas hydrophila/caviae  Few Klebsiella oxytoca/Raoutella ornithinolytica  Few Enterococcus faecalis  Few Streptococcus agalactiae (Group B) isolated  Group B streptococci are susceptible to ampicillin,  penicillin and cefazolin, but may be resistant to  erythromycin and clindamycin.  Recommendations for intrapartum prophylaxis for Group B  streptococci are penicillin or ampicillin.  --  Aeromonas hydrophila/caviae  Klebsiella oxytoca /Raoutella ornithinolytica  Enterococcus faecalis      Bone R 5th metatarsal bone clean ma  08-20 @ 03:59   No growth at 5 days  --    No polymorphonuclear leukocytes seen per low power field  No organisms seen per oil power field      .Blood Blood-Venous  08-14 @ 21:09   No Growth Final  --  --      .Surgical Swab Right foot plantar wound  08-14 @ 21:08   Moderate Streptococcus agalactiae (Group B) isolated  Group B streptococci are susceptible to ampicillin,  penicillin and cefazolin, but may be resistant to  erythromycin and clindamycin.  Recommendations for intrapartum prophylaxis for Group B  streptococci are penicillin or ampicillin.  Moderate Bacteroides fragilis "Susceptibilities not performed"  Normal skin filiberto isolated  --  --          MEDICATIONS:    MEDICATIONS  (STANDING):  apixaban 5 milliGRAM(s) Oral two times a day  aspirin  chewable 81 milliGRAM(s) Oral daily  atorvastatin 80 milliGRAM(s) Oral at bedtime  chlorhexidine 2% Cloths 1 Application(s) Topical <User Schedule>  collagenase Ointment 1 Application(s) Topical daily  cyanocobalamin 1000 MICROGram(s) Oral daily  ergocalciferol 70698 Unit(s) Oral <User Schedule>  ferrous    sulfate Liquid 300 milliGRAM(s) Enteral Tube daily  finasteride 5 milliGRAM(s) Oral daily  gabapentin 100 milliGRAM(s) Oral three times a day  insulin lispro (ADMELOG) corrective regimen sliding scale   SubCutaneous every 6 hours  meropenem  IVPB 1000 milliGRAM(s) IV Intermittent every 12 hours  metoprolol tartrate 25 milliGRAM(s) Oral two times a day  NIFEdipine XL 60 milliGRAM(s) Oral daily  pantoprazole  Injectable 40 milliGRAM(s) IV Push at bedtime      MEDICATIONS  (PRN):  ondansetron Injectable 4 milliGRAM(s) IV Push three times a day PRN Nausea and/or Vomiting  sodium chloride 0.9% lock flush 10 milliLiter(s) IV Push every 1 hour PRN Pre/post blood products, medications, blood draw, and to maintain line patency        REVIEW OF SYSTEMS:                           ALL ROS DONE [ X   ]      CONSTITUTIONAL:  LETHARGIC [   ], FEVER [   ], UNRESPONSIVE [   ]  CVS:  CP  [   ], SOB, [   ], PALPITATIONS [   ], DIZZYNESS [   ]  RS: COUGH [   ], SPUTUM [   ]  GI: ABDOMINAL PAIN [   ], NAUSEA [   ], VOMITINGS [   ], DIARRHEA [   ], CONSTIPATION [   ]  :  DYSURIA [   ], NOCTURIA [   ], INCREASED FREQUENCY [   ], DRIBLING [   ],  SKELETAL: PAINFUL JOINTS [   ], SWOLLEN JOINTS [   ], NECK ACHE [   ], LOW BACK ACHE [   ],  SKIN : ULCERS [   ], RASH [   ], ITCHING [   ]  CNS: HEAD ACHE [   ], DOUBLE VISION [   ], BLURRED VISION [   ], AMS / CONFUSION [   ], SEIZURES [   ], WEAKNESS [   ],TINGLING / NUMBNESS [   ]      PHYSICAL EXAMINATION:    GENERAL APPEARANCE: NO DISTRESS  HEENT:  NO PALLOR, NO  JVD,  NO   NODES, NECK SUPPLE  CVS: S1 +, S2 +,   RS: AEEB,  OCCASIONAL  RALES +,  RONCHI +, CRACKLES +     ABD: SOFT, NT, NO, BS +       EXT: NO PE  SKIN: WARM,   RIGHT FOOT WRAPPED, RIGHT CHEST TUBE SITE W/ BANDAGES  SKELETAL:  ROM ACCEPTABLE  CNS:  AAO X  2-3        RADIOLOGY :    < from: Transthoracic Echocardiogram (10.07.21 @ 15:26) >  CONCLUSIONS:  1. Normal mitral valve. Moderate mitral regurgitation.  2. Calcified aortic valve with decreased opening, cannot  exclude bicuspid. Peak transaortic valve gradient equals  32.4 mm Hg, mean transaortic valve gradient equals 19 mm  Hg, dimensionless index 0.22, estimated aortic valve area  equals 0.6sqcm (by continuity equation), consistent with  paradoxical low-flow low-gradient severe aortic stenosis.  Consider dobutamine stress echocardiogram and/or SABIHA for  further evaluation.  No aortic valve regurgitation seen.  3. Normal aortic root.  4. Normal left atrium.  5. Moderate concentric left ventricular hypertrophy.  6. Moderate global left ventricular systolic dysfunction  (EF 40-45% by visual estimation).  7. Grade II diastolic dysfunction (moderate).  8. Normal right atrium.  9. Normal right ventricular size with decreased RV systolic  function (TAPSE 1.2 cm).  10. RV systolic pressure is borderline normal at  34 mm Hg.  11. Tricuspid valve not well seen. Trace tricuspid  regurgitation.  12. Normal pulmonic valve. Trace pulmonic insufficiency is  noted.  13. No pericardial effusion.  14. Bilateral pleural effusions.    < end of copied text >      EXAM:  CT ABDOMEN AND PELVIS OC                            PROCEDURE DATE:  09/27/2021          INTERPRETATION:  CLINICAL INFORMATION: Small bowel obstruction.    COMPARISON: None.    CONTRAST/COMPLICATIONS:  IV Contrast: NONE  Oral Contrast: Omnipaque 300  Complications: None reported at time of study completion    PROCEDURE:  CT of the Abdomen and Pelvis was performed.  Sagittal and coronal reformats were performed.    FINDINGS:  LOWER CHEST: Small bilateral pleural effusions with compressiveatelectasis of both lower lobes.    LIVER: Within normal limits.  BILE DUCTS: Normal caliber.  GALLBLADDER: Distended. Small layering gallstones.  SPLEEN: Within normal limits.  PANCREAS: Within normal limits.  ADRENALS: Within normal limits.  KIDNEYS/URETERS: No hydronephrosis. Nonobstructing left upper pole calculus, measuring 0.6 cm.    BLADDER: Urinary bladder contains air and a Castañeda catheter balloon.  REPRODUCTIVE ORGANS: Prostate is not enlarged.    BOWEL: No bowel obstruction. Appendix isnormal. Colonic diverticulosis.  PERITONEUM: Mild presacral fluid.  VESSELS: Atherosclerotic changes.  RETROPERITONEUM/LYMPH NODES: No lymphadenopathy.  ABDOMINAL WALL: Within normal limits.  BONES: Degenerative changes. Sternotomy.    IMPRESSION:  No acute intra-abdominal pathology.    Small bilateral pleural effusions with compressive atelectasis of both lower lobes.    ====================================================    EXAM:  MR FOOT RT                            PROCEDURE DATE:  09/17/2021          INTERPRETATION:  Clinical Information: Recent fifth metatarsal head resection now with fifth digit pain, swelling and foul discharge.    Comparison: Radiographs of the right foot from 9/16/2021 and MRI the right foot from 8/16/2021.    Technique:  MRI of the right midfoot and forefoot.  Intravenous Contrast: None.    Findings:    There is a resection at the mid aspect of the fifth metatarsal shaft. There is a lateral soft tissue wound beginning at the level of the amputation which extends distally. There is susceptibility artifact in the region of the amputation consistent with postoperative change. There is hyperintense T2 marrow signal throughout the remaining fifth metatarsal and within the adjacent fourth metatarsal head which is nonspecific and could be related to recent postoperative changes although osteomyelitis is suspected.    There is edema and mild atrophy within the plantar muscles of the foot. There is minimal spurring at the first metatarsophalangeal and hallux sesamoid articulations.    Impression:  Resection at the mid aspect of the fifth metatarsal. Lateral soft tissue wound with marrow signal abnormality throughout the fifth metatarsal and within the adjacent fourth metatarsal head which is nonspecific in the setting of recent surgery although osteomyelitis is suspected.        ASSESSMENT :     Headache    HTN (hypertension)    HLD (hyperlipidemia)    DM (diabetes mellitus)    CAD (coronary artery disease)    Glaucoma, angle-closure    [place] (hard of hearing)      S/P CABG (coronary artery bypass graft)    History of thyroid surgery        PLAN:    HPI:  76M from home, lives with daughter w/ PMH DM on insulin, HTN, HLD, CAD, 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 Pt with sharp pain on the R lateral foot. As per daughter, pt was taking tylenol which did not help his pain prompting the patient to ask his daughter to take him to the hospital. Pt is a poor historian. Daughter states that the patient did not see his podiatrist after the surgery. No fever, chest, pain, palpitations, nausea, vomiting, diarrhea (17 Sep 2021 01:11)      # FAILURE TO THRIVE - ADDED IV FLUIDS D5NS - 75 ML / HR X 3 DAYS , F/UP BMP AND OBSERVE FOR FLUID OVER LOAD     # PATIENT IS S/P ICU MONITORING AND RIGHT CHEST TUBE REMOVAL ON 10/15/2021     # COVID EXPOSURE ON 10/13 - ON CONTACT AND DROPLET ISOLATION PRECAUTION; F/U COVID PCR    # SUSPECT RIGHT FOOT OSTEOMYELITIS S/P RIGHT 5TH RAY RESECTION [8/19] AND S/P DEBRIDEMENT, GRAFT APPLICATION, BONE BX AND WOUND VAC APPLICATION 9/22 - BONE BX W/ ACUTE OSTEOMYELITIS AND NECROTIC PERIOSTEAL TISSUE  ** RECENT ADMISSION FOR RIGHT DIABETIC FOOT ULCER, CELLULITIS, OSTEOMYELITIS RIGHT 5th METATARSAL S/P RIGHT 5TH PARTIAL RAY AMPUTATION [8/20] - BONE PATHOLOGY W/ CLEAN MARGINS    - S/P VANCOMYCIN AND ZOSYN ; NOW ON UNASYN AND LEVAQUIN GIVEN OREN; PER ID SWITCH TO ZOSYN UNTIL 11/3 [NOW ON VANC + MEROPENEM]  - RECENTLY HAD SIMON W/ MILD PAD  - REVIEWED WOUND CX [ ENTEROCOCCUS, AEROMONAS, KLEBSIELLA] AND BCX  - ID CONSULT, PODIATRY CONSULT    - PICC LINE PLACED TO COMPLETE 6 WEEKS OF ANTIBIOTICS - PER ID SWITCH TO ZOSYN UNTIL 11/3 ON D/C    # ACUTE HYPOXIC RESPIRATORY FAILURE DUE TO ACUTE ON CHRONIC SYSTOLIC CHF  (  LV EF 40- 45 % ) , DIASTOLIC LV DYSFUNCTION , ,  SEVERE AORTIC STENOSIS & MODERATE MITRAL REGURGITATION  &  ASPIRATION PNA;  - S/P CHEST TUBE INSERTION AND REMOVAL  , S/P LASIX ,   CARDIOLOGY CONSULT IN PROGRESS      - CHEST TUBE PLACED [10/8] - FLUID CONSISTENT WITH TRANSUDATIVE PROCESS  - S/P EXTUBATION 10/13  - S/P CHEST TUBE REMOVAL 10/15      # SEPTIC SHOCK - ? S/T HYPOVOLEMIA VS. SEPSIS - S/P CVC [SWITCHED FROM FEMORAL TO LEFT IJ], S/P VASOPRESSORS - RESOLING  - BCX - NGTD      # TRANSIENT NEW ONSET A.FIB, PAROXYSMAL - ON ELIQUIS, CARDIOLOGY CONSULT IN PROGRESS    # FUNGURIA - D/C FLUCONAZOLE GIVEN TRANSAMINITIS    # TRANSAMINITIS - SUSPECT THIS IS ISCHEMIC HEPATITIS - IMPROVING - HEPATOLOGY CONSULT IN PROGRESS    # BOWEL DISTENTION - ? ILEUS - OPTIMIZING ELECTROLYTES - IMPROVED  - SURGERY CONSULT IN PROGRESS    # CHOLELITHIASIS - TRENDING LFTS, RUQ U/S - CHOLELITHIASIS, SURGERY CONSULT IN PROGRESS  - GI CONSULT IN PROGRESS - LESS SUSPICIOUS FOR CHOLECYSTITIS    # DYSPEPSIA - PLACED ON PPI BID WITH IMPROVEMENT, UNDERWENT ST EVAL     # ELEVATED TROPONINS - NSTEMI - ? DEMAND - WILL NEED ISCHEMIC EVAL ONCE ACUTE INFECTION RESOLVES PER CARDIOLOGY, CARDIOLOGY CONSULT IN PROGRESS  - ON ASA, STATIN, BB    # OREN ON CKD - S/T ATN AND URINARY RETENTION - S/P IVF, NOW W/ CASTAÑEDA, NEPHROLOGY CONUSLT IN PROGRESS  -  BPH ON FLOMAX, AND FINASTERIDE  - FAILED TOV WITH WORSENING RENAL FXN - NOTED URINARY RETENTION [10/4] - REPLACED CASTAÑEDA    # MEDICAL CLEARANCE FOR SURGERY - RCRI - 2 - 10.1 % 30 DAY RISK OF DEATH, MI OR CARDIAC ARREST  - CARDIOLOGY CONSULT     # DM -  HBA1C - 11  - LANTUS, SSI + FS    # CAD S/P CABG - PLACED ON ASA, STATIN, BB  - ECHO - SEVERE AORTIC STENOSIS, SEVERE CONCENTRIC LVH, G1DD, MILD OK  - CARDIOLOGY CONSULT - DR. BASSETT   - MAY NEED ISCHEMIC EVAL ONCE ACUTE ILLNESS RESOLVES INCLUDING CARDIAC CATHETERIZATION    # HTN, HLD  - ON METOPROLOL, NICARDIPINE, STATIN       #  IMPAIRED GAIT DUE TO CERVICAL & LS SPONDYLOSIS, POLY ARTHRITIS AND DIABETIC PERIPHERAL NEUROPATHY, OP VITAMIN D DEFICIENCY - ON CHOLECALCIFEROL, OBTAIN PT & OT   #  FAILURE TO THRIVE , MODERATE PROTEIN CALORIE MALNUTRITION       # CASE DISCUSSED AT LENGTH WITH PATIENT AND DAUGHTER, BIRDIE ROBERT AT BEDSIDE AND VIA PHONE @ [phone] [10/5] - CASE DICUSSED AT LENGTH AND ALL QUESTIONS WERE ANSWERED. DAUGHTER UNDERSTOOD THAT PROGNOSIS IS GUARDED.  # PATIENT AND DAUGHTER REFUSED [facility] ON PREVIOUS ADMISSION, PREVIOUSLY WISHED FOR PATIENT RETURN HOME W/ HOME PT; HOWEVER NOW, DAUGHTER WISHES FOR PATIENT TO GO TO [facility], AS PATIENT'S WIFE IS CURRENTLY ADMITTED THERE    # GI AND DVT PPX    DR. JAELYN MUNSON

## 2021-10-17 NOTE — PROGRESS NOTE ADULT - ASSESSMENT
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  As per daughter, patient was taking tylenol which did not help his pain prompting the patient to ask his daughter to take him to the hospital. ON admission, he has no fever or Leukocytosis. The Xray of Right foot shows no Osteomyelitis but MRI of Right foot shows Lateral soft tissue wound with marrow signal abnormality throughout the fifth metatarsal which is consistent of Osteomyelitis. He has seen by Podiatry and wound culture has sent, Zosyn and Vancomycin has started. The ID consult requested to assist with further evaluation and antibiotic management.     # Right Fifth metatarsal DFU with drainage and Osteomyelitis- wound cx grew Enterococcus, Aeromonas and Klebsiella - Zosyn is the ideal to cover All organisms but kidney function is worsening, hence change to Unasyn and Levaquin until kidney function is improved - The pathology shows Bone with acute osteomyelitis and necrotic periosteal tissue.   # OREN- s/p urinary retention -s/p ibrahim catheter - s/p discontinue ACEI  # RLL pneumonia- most likely Aspiration, S/p Vomiting - On Unasyn  # Large bowel distension  # Candiduria- 9/22/21  # Pulmonary edema with bilateral moderate to large pleural effusions- on CT chest, 10/5/21- s/p intubation 10/7- s/p Right sided chest tube placement by CT team, 10/8/21  # COVID Exposure - PCR negative as of  10/11/21    would recommend:    1. Monitor kidney function   3. Continue Meropenem to 1 g q 8hours based on crcl  4. Wound care as per Podiatry  5. Aspiration precaution    d/w Nursing  staff    Attending Attestation:    Spent more than 35 minutes on total encounter, more than 50 % of the visit was spent counseling and/or coordinating care by the Attending physician.  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  As per daughter, patient was taking tylenol which did not help his pain prompting the patient to ask his daughter to take him to the hospital. ON admission, he has no fever or Leukocytosis. The Xray of Right foot shows no Osteomyelitis but MRI of Right foot shows Lateral soft tissue wound with marrow signal abnormality throughout the fifth metatarsal which is consistent of Osteomyelitis. He has seen by Podiatry and wound culture has sent, Zosyn and Vancomycin has started. The ID consult requested to assist with further evaluation and antibiotic management.     # Right Fifth metatarsal DFU with drainage and Osteomyelitis- wound cx grew Enterococcus, Aeromonas and Klebsiella - Zosyn is the ideal to cover All organisms but kidney function is worsening, hence change to Unasyn and Levaquin until kidney function is improved - The pathology shows Bone with acute osteomyelitis and necrotic periosteal tissue.   # OREN- s/p urinary retention -s/p ibrahim catheter - s/p discontinue ACEI  # RLL pneumonia- most likely Aspiration, S/p Vomiting - On Unasyn  # Large bowel distension  # Candiduria- 9/22/21  # Pulmonary edema with bilateral moderate to large pleural effusions- on CT chest, 10/5/21- s/p intubation 10/7- s/p Right sided chest tube placement by CT team, 10/8/21  # COVID Exposure - PCR negative as of  10/11/21    would recommend:    1. Monitor kidney function   2. Continue Meropenem to 1 g q 8hours based on crcl  3. Wound care as per Podiatry  4. Aspiration precaution  5. Need IV Abx until 11/3/21    d/w Nursing  staff    Attending Attestation:    Spent more than 35 minutes on total encounter, more than 50 % of the visit was spent counseling and/or coordinating care by the Attending physician.

## 2021-10-17 NOTE — PHYSICAL THERAPY INITIAL EVALUATION ADULT - PLANNED THERAPY INTERVENTIONS, PT EVAL
balance training/bed mobility training/gait training/neuromuscular re-education/strengthening/transfer training
balance training/bed mobility training/gait training/neuromuscular re-education/strengthening/transfer training

## 2021-10-18 LAB
ANION GAP SERPL CALC-SCNC: 9 MMOL/L — SIGNIFICANT CHANGE UP (ref 5–17)
BUN SERPL-MCNC: 17 MG/DL — SIGNIFICANT CHANGE UP (ref 7–18)
CALCIUM SERPL-MCNC: 8.9 MG/DL — SIGNIFICANT CHANGE UP (ref 8.4–10.5)
CHLORIDE SERPL-SCNC: 107 MMOL/L — SIGNIFICANT CHANGE UP (ref 96–108)
CO2 SERPL-SCNC: 23 MMOL/L — SIGNIFICANT CHANGE UP (ref 22–31)
CREAT SERPL-MCNC: 0.97 MG/DL — SIGNIFICANT CHANGE UP (ref 0.5–1.3)
GLUCOSE BLDC GLUCOMTR-MCNC: 113 MG/DL — HIGH (ref 70–99)
GLUCOSE BLDC GLUCOMTR-MCNC: 116 MG/DL — HIGH (ref 70–99)
GLUCOSE BLDC GLUCOMTR-MCNC: 128 MG/DL — HIGH (ref 70–99)
GLUCOSE BLDC GLUCOMTR-MCNC: 139 MG/DL — HIGH (ref 70–99)
GLUCOSE SERPL-MCNC: 128 MG/DL — HIGH (ref 70–99)
HCT VFR BLD CALC: 27.6 % — LOW (ref 39–50)
HGB BLD-MCNC: 9.1 G/DL — LOW (ref 13–17)
MAGNESIUM SERPL-MCNC: 2.1 MG/DL — SIGNIFICANT CHANGE UP (ref 1.6–2.6)
MCHC RBC-ENTMCNC: 29.6 PG — SIGNIFICANT CHANGE UP (ref 27–34)
MCHC RBC-ENTMCNC: 33 GM/DL — SIGNIFICANT CHANGE UP (ref 32–36)
MCV RBC AUTO: 89.9 FL — SIGNIFICANT CHANGE UP (ref 80–100)
NRBC # BLD: 0 /100 WBCS — SIGNIFICANT CHANGE UP (ref 0–0)
PLATELET # BLD AUTO: 238 K/UL — SIGNIFICANT CHANGE UP (ref 150–400)
POTASSIUM SERPL-MCNC: 3.9 MMOL/L — SIGNIFICANT CHANGE UP (ref 3.5–5.3)
POTASSIUM SERPL-SCNC: 3.9 MMOL/L — SIGNIFICANT CHANGE UP (ref 3.5–5.3)
RBC # BLD: 3.07 M/UL — LOW (ref 4.2–5.8)
RBC # FLD: 14.4 % — SIGNIFICANT CHANGE UP (ref 10.3–14.5)
SODIUM SERPL-SCNC: 139 MMOL/L — SIGNIFICANT CHANGE UP (ref 135–145)
WBC # BLD: 4.51 K/UL — SIGNIFICANT CHANGE UP (ref 3.8–10.5)
WBC # FLD AUTO: 4.51 K/UL — SIGNIFICANT CHANGE UP (ref 3.8–10.5)

## 2021-10-18 PROCEDURE — 99232 SBSQ HOSP IP/OBS MODERATE 35: CPT

## 2021-10-18 RX ADMIN — MEROPENEM 100 MILLIGRAM(S): 1 INJECTION INTRAVENOUS at 17:36

## 2021-10-18 RX ADMIN — Medication 1 APPLICATION(S): at 12:40

## 2021-10-18 RX ADMIN — APIXABAN 5 MILLIGRAM(S): 2.5 TABLET, FILM COATED ORAL at 17:36

## 2021-10-18 RX ADMIN — PREGABALIN 1000 MICROGRAM(S): 225 CAPSULE ORAL at 12:39

## 2021-10-18 RX ADMIN — GABAPENTIN 100 MILLIGRAM(S): 400 CAPSULE ORAL at 13:06

## 2021-10-18 RX ADMIN — APIXABAN 5 MILLIGRAM(S): 2.5 TABLET, FILM COATED ORAL at 05:24

## 2021-10-18 RX ADMIN — Medication 25 MILLIGRAM(S): at 17:37

## 2021-10-18 RX ADMIN — Medication 60 MILLIGRAM(S): at 05:24

## 2021-10-18 RX ADMIN — Medication 300 MILLIGRAM(S): at 12:40

## 2021-10-18 RX ADMIN — MEROPENEM 100 MILLIGRAM(S): 1 INJECTION INTRAVENOUS at 05:24

## 2021-10-18 RX ADMIN — ATORVASTATIN CALCIUM 80 MILLIGRAM(S): 80 TABLET, FILM COATED ORAL at 21:20

## 2021-10-18 RX ADMIN — Medication 25 MILLIGRAM(S): at 05:24

## 2021-10-18 RX ADMIN — Medication 81 MILLIGRAM(S): at 12:40

## 2021-10-18 RX ADMIN — CHLORHEXIDINE GLUCONATE 1 APPLICATION(S): 213 SOLUTION TOPICAL at 05:24

## 2021-10-18 RX ADMIN — GABAPENTIN 100 MILLIGRAM(S): 400 CAPSULE ORAL at 05:24

## 2021-10-18 RX ADMIN — PANTOPRAZOLE SODIUM 40 MILLIGRAM(S): 20 TABLET, DELAYED RELEASE ORAL at 21:20

## 2021-10-18 RX ADMIN — GABAPENTIN 100 MILLIGRAM(S): 400 CAPSULE ORAL at 21:20

## 2021-10-18 RX ADMIN — FINASTERIDE 5 MILLIGRAM(S): 5 TABLET, FILM COATED ORAL at 12:39

## 2021-10-18 NOTE — PROGRESS NOTE ADULT - SUBJECTIVE AND OBJECTIVE BOX
`Patient is a 76y old  Male who presents with a chief complaint of R Foot Pain (17 Oct 2021 20:59)    PATIENT IS SEEN AND EXAMINED IN MEDICAL FLOOR.  NGT [    ]    MADDY [   ]      GT [   ]    ALLERGIES:  No Known Allergies      Daily     Daily     VITALS:    Vital Signs Last 24 Hrs  T(C): 36.4 (18 Oct 2021 05:08), Max: 37.1 (17 Oct 2021 13:30)  T(F): 97.6 (18 Oct 2021 05:08), Max: 98.7 (17 Oct 2021 13:30)  HR: 90 (18 Oct 2021 05:08) (69 - 90)  BP: 148/71 (18 Oct 2021 05:08) (122/66 - 153/66)  BP(mean): --  RR: 18 (18 Oct 2021 05:08) (18 - 18)  SpO2: 97% (18 Oct 2021 05:08) (97% - 97%)    LABS:    CBC Full  -  ( 18 Oct 2021 07:50 )  WBC Count : 4.51 K/uL  RBC Count : 3.07 M/uL  Hemoglobin : 9.1 g/dL  Hematocrit : 27.6 %  Platelet Count - Automated : 238 K/uL  Mean Cell Volume : 89.9 fl  Mean Cell Hemoglobin : 29.6 pg  Mean Cell Hemoglobin Concentration : 33.0 gm/dL  Auto Neutrophil # : x  Auto Lymphocyte # : x  Auto Monocyte # : x  Auto Eosinophil # : x  Auto Basophil # : x  Auto Neutrophil % : x  Auto Lymphocyte % : x  Auto Monocyte % : x  Auto Eosinophil % : x  Auto Basophil % : x      10-18    139  |  107  |  17  ----------------------------<  128<H>  3.9   |  23  |  0.97    Ca    8.9      18 Oct 2021 07:50  Mg     2.1     10-18    TPro  6.7  /  Alb  2.3<L>  /  TBili  0.8  /  DBili  x   /  AST  39  /  ALT  81<H>  /  AlkPhos  134<H>  10-17    CAPILLARY BLOOD GLUCOSE      POCT Blood Glucose.: 128 mg/dL (18 Oct 2021 07:38)  POCT Blood Glucose.: 113 mg/dL (18 Oct 2021 05:50)  POCT Blood Glucose.: 91 mg/dL (17 Oct 2021 21:14)  POCT Blood Glucose.: 126 mg/dL (17 Oct 2021 17:22)  POCT Blood Glucose.: 85 mg/dL (17 Oct 2021 12:20)        LIVER FUNCTIONS - ( 17 Oct 2021 06:20 )  Alb: 2.3 g/dL / Pro: 6.7 g/dL / ALK PHOS: 134 U/L / ALT: 81 U/L DA / AST: 39 U/L / GGT: x           Creatinine Trend: 0.97<--, 1.05<--, 0.97<--, 0.99<--, 0.99<--, 1.31<--  I&O's Summary    17 Oct 2021 07:01  -  18 Oct 2021 07:00  --------------------------------------------------------  IN: 0 mL / OUT: 2000 mL / NET: -2000 mL            .Body Fluid Pleural Fluid  10-08 @ 18:51   No growth at 1 week.  --  --      .Body Fluid Pleural Fluid  10-08 @ 18:34   No growth at 5 days  --    polymorphonuclear leukocytes seen  No organisms seen  by cytocentrifuge      .Tissue Right 5th toe r/o osteomyeltis  09-22 @ 13:54   No growth at 5 days  --    No polymorphonuclear cells seen per low power field  No organisms seen per oil power field      .Blood Blood-Venous  09-17 @ 10:28   No Growth Final  --  --      .Surgical Swab right foot wound  09-16 @ 21:42   Culture yields >4 types of aerobic and/or anaerobic bacteria  Call client services within 7 days if further workup is clinically  indicated. Culture includes  Rare Aeromonas hydrophila/caviae  Few Klebsiella oxytoca/Raoutella ornithinolytica  Few Enterococcus faecalis  Few Streptococcus agalactiae (Group B) isolated  Group B streptococci are susceptible to ampicillin,  penicillin and cefazolin, but may be resistant to  erythromycin and clindamycin.  Recommendations for intrapartum prophylaxis for Group B  streptococci are penicillin or ampicillin.  --  Aeromonas hydrophila/caviae  Klebsiella oxytoca /Raoutella ornithinolytica  Enterococcus faecalis      Bone R 5th metatarsal bone clean ma  08-20 @ 03:59   No growth at 5 days  --    No polymorphonuclear leukocytes seen per low power field  No organisms seen per oil power field      .Blood Blood-Venous  08-14 @ 21:09   No Growth Final  --  --      .Surgical Swab Right foot plantar wound  08-14 @ 21:08   Moderate Streptococcus agalactiae (Group B) isolated  Group B streptococci are susceptible to ampicillin,  penicillin and cefazolin, but may be resistant to  erythromycin and clindamycin.  Recommendations for intrapartum prophylaxis for Group B  streptococci are penicillin or ampicillin.  Moderate Bacteroides fragilis "Susceptibilities not performed"  Normal skin filiberto isolated  --  --          MEDICATIONS:    MEDICATIONS  (STANDING):  apixaban 5 milliGRAM(s) Oral two times a day  aspirin  chewable 81 milliGRAM(s) Oral daily  atorvastatin 80 milliGRAM(s) Oral at bedtime  chlorhexidine 2% Cloths 1 Application(s) Topical <User Schedule>  collagenase Ointment 1 Application(s) Topical daily  cyanocobalamin 1000 MICROGram(s) Oral daily  dextrose 5% + sodium chloride 0.9%. 1000 milliLiter(s) (75 mL/Hr) IV Continuous <Continuous>  ergocalciferol 53309 Unit(s) Oral <User Schedule>  ferrous    sulfate Liquid 300 milliGRAM(s) Enteral Tube daily  finasteride 5 milliGRAM(s) Oral daily  gabapentin 100 milliGRAM(s) Oral three times a day  insulin lispro (ADMELOG) corrective regimen sliding scale   SubCutaneous every 6 hours  meropenem  IVPB 1000 milliGRAM(s) IV Intermittent every 12 hours  metoprolol tartrate 25 milliGRAM(s) Oral two times a day  NIFEdipine XL 60 milliGRAM(s) Oral daily  pantoprazole  Injectable 40 milliGRAM(s) IV Push at bedtime      MEDICATIONS  (PRN):  ondansetron Injectable 4 milliGRAM(s) IV Push three times a day PRN Nausea and/or Vomiting  sodium chloride 0.9% lock flush 10 milliLiter(s) IV Push every 1 hour PRN Pre/post blood products, medications, blood draw, and to maintain line patency      REVIEW OF SYSTEMS:                           ALL ROS DONE [ X   ]    CONSTITUTIONAL:  LETHARGIC [   ], FEVER [   ], UNRESPONSIVE [   ]  CVS:  CP  [   ], SOB, [   ], PALPITATIONS [   ], DIZZYNESS [   ]  RS: COUGH [   ], SPUTUM [   ]  GI: ABDOMINAL PAIN [   ], NAUSEA [   ], VOMITINGS [   ], DIARRHEA [   ], CONSTIPATION [   ]  :  DYSURIA [   ], NOCTURIA [   ], INCREASED FREQUENCY [   ], DRIBLING [   ],  SKELETAL: PAINFUL JOINTS [   ], SWOLLEN JOINTS [   ], NECK ACHE [   ], LOW BACK ACHE [   ],  SKIN : ULCERS [   ], RASH [   ], ITCHING [   ]  CNS: HEAD ACHE [   ], DOUBLE VISION [   ], BLURRED VISION [   ], AMS / CONFUSION [   ], SEIZURES [   ], WEAKNESS [   ],TINGLING / NUMBNESS [   ]    PHYSICAL EXAMINATION:    GENERAL APPEARANCE: NO DISTRESS  HEENT:  NO PALLOR, NO  JVD,  NO   NODES, NECK SUPPLE  CVS: S1 +, S2 +,   RS: AEEB,  OCCASIONAL  RALES +,  RONCHI +, CRACKLES +     ABD: SOFT, NT, NO, BS +       EXT: NO PE  SKIN: WARM,   RIGHT FOOT WRAPPED, RIGHT CHEST TUBE SITE W/ BANDAGES  SKELETAL:  ROM ACCEPTABLE  CNS:  AAO X  2-3        RADIOLOGY :    < from: Transthoracic Echocardiogram (10.07.21 @ 15:26) >  CONCLUSIONS:  1. Normal mitral valve. Moderate mitral regurgitation.  2. Calcified aortic valve with decreased opening, cannot  exclude bicuspid. Peak transaortic valve gradient equals  32.4 mm Hg, mean transaortic valve gradient equals 19 mm  Hg, dimensionless index 0.22, estimated aortic valve area  equals 0.6sqcm (by continuity equation), consistent with  paradoxical low-flow low-gradient severe aortic stenosis.  Consider dobutamine stress echocardiogram and/or SABIHA for  further evaluation.  No aortic valve regurgitation seen.  3. Normal aortic root.  4. Normal left atrium.  5. Moderate concentric left ventricular hypertrophy.  6. Moderate global left ventricular systolic dysfunction  (EF 40-45% by visual estimation).  7. Grade II diastolic dysfunction (moderate).  8. Normal right atrium.  9. Normal right ventricular size with decreased RV systolic  function (TAPSE 1.2 cm).  10. RV systolic pressure is borderline normal at  34 mm Hg.  11. Tricuspid valve not well seen. Trace tricuspid  regurgitation.  12. Normal pulmonic valve. Trace pulmonic insufficiency is  noted.  13. No pericardial effusion.  14. Bilateral pleural effusions.    < end of copied text >      EXAM:  CT ABDOMEN AND PELVIS OC                            PROCEDURE DATE:  09/27/2021          INTERPRETATION:  CLINICAL INFORMATION: Small bowel obstruction.    COMPARISON: None.    CONTRAST/COMPLICATIONS:  IV Contrast: NONE  Oral Contrast: Omnipaque 300  Complications: None reported at time of study completion    PROCEDURE:  CT of the Abdomen and Pelvis was performed.  Sagittal and coronal reformats were performed.    FINDINGS:  LOWER CHEST: Small bilateral pleural effusions with compressiveatelectasis of both lower lobes.    LIVER: Within normal limits.  BILE DUCTS: Normal caliber.  GALLBLADDER: Distended. Small layering gallstones.  SPLEEN: Within normal limits.  PANCREAS: Within normal limits.  ADRENALS: Within normal limits.  KIDNEYS/URETERS: No hydronephrosis. Nonobstructing left upper pole calculus, measuring 0.6 cm.    BLADDER: Urinary bladder contains air and a Castañeda catheter balloon.  REPRODUCTIVE ORGANS: Prostate is not enlarged.    BOWEL: No bowel obstruction. Appendix isnormal. Colonic diverticulosis.  PERITONEUM: Mild presacral fluid.  VESSELS: Atherosclerotic changes.  RETROPERITONEUM/LYMPH NODES: No lymphadenopathy.  ABDOMINAL WALL: Within normal limits.  BONES: Degenerative changes. Sternotomy.    IMPRESSION:  No acute intra-abdominal pathology.    Small bilateral pleural effusions with compressive atelectasis of both lower lobes.    ====================================================    EXAM:  MR FOOT RT                            PROCEDURE DATE:  09/17/2021          INTERPRETATION:  Clinical Information: Recent fifth metatarsal head resection now with fifth digit pain, swelling and foul discharge.    Comparison: Radiographs of the right foot from 9/16/2021 and MRI the right foot from 8/16/2021.    Technique:  MRI of the right midfoot and forefoot.  Intravenous Contrast: None.    Findings:    There is a resection at the mid aspect of the fifth metatarsal shaft. There is a lateral soft tissue wound beginning at the level of the amputation which extends distally. There is susceptibility artifact in the region of the amputation consistent with postoperative change. There is hyperintense T2 marrow signal throughout the remaining fifth metatarsal and within the adjacent fourth metatarsal head which is nonspecific and could be related to recent postoperative changes although osteomyelitis is suspected.    There is edema and mild atrophy within the plantar muscles of the foot. There is minimal spurring at the first metatarsophalangeal and hallux sesamoid articulations.    Impression:  Resection at the mid aspect of the fifth metatarsal. Lateral soft tissue wound with marrow signal abnormality throughout the fifth metatarsal and within the adjacent fourth metatarsal head which is nonspecific in the setting of recent surgery although osteomyelitis is suspected.        ASSESSMENT :     Headache    HTN (hypertension)    HLD (hyperlipidemia)    DM (diabetes mellitus)    CAD (coronary artery disease)    Glaucoma, angle-closure    Habematolel (hard of hearing)      S/P CABG (coronary artery bypass graft)    History of thyroid surgery        PLAN:    HPI:  76M from home, lives with daughter w/ PMH DM on insulin, HTN, HLD, CAD, 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 Pt with sharp pain on the R lateral foot. As per daughter, pt was taking tylenol which did not help his pain prompting the patient to ask his daughter to take him to the hospital. Pt is a poor historian. Daughter states that the patient did not see his podiatrist after the surgery. No fever, chest, pain, palpitations, nausea, vomiting, diarrhea (17 Sep 2021 01:11)      # FAILURE TO THRIVE - ADDED IV FLUIDS D5NS - 75 ML / HR X 3 DAYS , F/UP BMP AND OBSERVE FOR FLUID OVER LOAD     # PATIENT IS S/P ICU MONITORING AND RIGHT CHEST TUBE REMOVAL ON 10/15/2021     # COVID EXPOSURE ON 10/13 - ON CONTACT AND DROPLET ISOLATION PRECAUTION; F/U COVID PCR    # SUSPECT RIGHT FOOT OSTEOMYELITIS S/P RIGHT 5TH RAY RESECTION [8/19] AND S/P DEBRIDEMENT, GRAFT APPLICATION, BONE BX AND WOUND VAC APPLICATION 9/22 - BONE BX W/ ACUTE OSTEOMYELITIS AND NECROTIC PERIOSTEAL TISSUE  ** RECENT ADMISSION FOR RIGHT DIABETIC FOOT ULCER, CELLULITIS, OSTEOMYELITIS RIGHT 5th METATARSAL S/P RIGHT 5TH PARTIAL RAY AMPUTATION [8/20] - BONE PATHOLOGY W/ CLEAN MARGINS    - S/P VANCOMYCIN AND ZOSYN ; NOW ON UNASYN AND LEVAQUIN GIVEN OREN; PER ID SWITCH TO ZOSYN UNTIL 11/3 [NOW ON VANC + MEROPENEM]  - RECENTLY HAD SIMON W/ MILD PAD  - REVIEWED WOUND CX [ ENTEROCOCCUS, AEROMONAS, KLEBSIELLA] AND BCX  - ID CONSULT, PODIATRY CONSULT    - PICC LINE PLACED TO COMPLETE 6 WEEKS OF ANTIBIOTICS - PER ID SWITCH TO ZOSYN UNTIL 11/3 ON D/C    # ACUTE HYPOXIC RESPIRATORY FAILURE DUE TO ACUTE ON CHRONIC SYSTOLIC CHF  (  LV EF 40- 45 % ) , DIASTOLIC LV DYSFUNCTION , ,  SEVERE AORTIC STENOSIS & MODERATE MITRAL REGURGITATION  &  ASPIRATION PNA;  - S/P CHEST TUBE INSERTION AND REMOVAL  , S/P LASIX ,   CARDIOLOGY CONSULT IN PROGRESS      - CHEST TUBE PLACED [10/8] - FLUID CONSISTENT WITH TRANSUDATIVE PROCESS  - S/P EXTUBATION 10/13  - S/P CHEST TUBE REMOVAL 10/15      # SEPTIC SHOCK - ? S/T HYPOVOLEMIA VS. SEPSIS - S/P CVC [SWITCHED FROM FEMORAL TO LEFT IJ], S/P VASOPRESSORS - RESOLING  - BCX - NGTD      # TRANSIENT NEW ONSET A.FIB, PAROXYSMAL - ON ELIQUIS, CARDIOLOGY CONSULT IN PROGRESS    # FUNGURIA - D/C FLUCONAZOLE GIVEN TRANSAMINITIS    # TRANSAMINITIS - SUSPECT THIS IS ISCHEMIC HEPATITIS - IMPROVING - HEPATOLOGY CONSULT IN PROGRESS    # BOWEL DISTENTION - ? ILEUS - OPTIMIZING ELECTROLYTES - IMPROVED  - SURGERY CONSULT IN PROGRESS    # CHOLELITHIASIS - TRENDING LFTS, RUQ U/S - CHOLELITHIASIS, SURGERY CONSULT IN PROGRESS  - GI CONSULT IN PROGRESS - LESS SUSPICIOUS FOR CHOLECYSTITIS    # DYSPEPSIA - PLACED ON PPI BID WITH IMPROVEMENT, UNDERWENT ST EVAL     # ELEVATED TROPONINS - NSTEMI - ? DEMAND - WILL NEED ISCHEMIC EVAL ONCE ACUTE INFECTION RESOLVES PER CARDIOLOGY, CARDIOLOGY CONSULT IN PROGRESS  - ON ASA, STATIN, BB    # OREN ON CKD - S/T ATN AND URINARY RETENTION - S/P IVF, NOW W/ CASTAÑEDA, NEPHROLOGY CONUSLT IN PROGRESS  -  BPH ON FLOMAX, AND FINASTERIDE  - FAILED TOV WITH WORSENING RENAL FXN - NOTED URINARY RETENTION [10/4] - REPLACED CASTAÑEDA    # MEDICAL CLEARANCE FOR SURGERY - RCRI - 2 - 10.1 % 30 DAY RISK OF DEATH, MI OR CARDIAC ARREST  - CARDIOLOGY CONSULT     # DM -  HBA1C - 11  - LANTUS, SSI + FS    # CAD S/P CABG - PLACED ON ASA, STATIN, BB  - ECHO - SEVERE AORTIC STENOSIS, SEVERE CONCENTRIC LVH, G1DD, MILD SD  - CARDIOLOGY CONSULT - DR. BASSETT   - MAY NEED ISCHEMIC EVAL ONCE ACUTE ILLNESS RESOLVES INCLUDING CARDIAC CATHETERIZATION    # HTN, HLD  - ON METOPROLOL, NIFEDDIPINE, STATIN       #  IMPAIRED GAIT DUE TO CERVICAL & LS SPONDYLOSIS, POLY ARTHRITIS AND DIABETIC PERIPHERAL NEUROPATHY, OP VITAMIN D DEFICIENCY - ON CHOLECALCIFEROL, OBTAIN PT & OT   #  FAILURE TO THRIVE , MODERATE PROTEIN CALORIE MALNUTRITION       # CASE DISCUSSED AT LENGTH WITH PATIENT AND DAUGHTER, BIRDIE YESICA AT BEDSIDE AND VIA PHONE @ [phone] [10/5] - CASE DICUSSED AT LENGTH AND ALL QUESTIONS WERE ANSWERED. DAUGHTER UNDERSTOOD THAT PROGNOSIS IS GUARDED.  # PATIENT AND DAUGHTER REFUSED [facility] ON PREVIOUS ADMISSION, PREVIOUSLY WISHED FOR PATIENT RETURN HOME W/ HOME PT; HOWEVER NOW, DAUGHTER WISHES FOR PATIENT TO GO TO [facility], AS PATIENT'S WIFE IS CURRENTLY ADMITTED THERE    # GI AND DVT PPX   Patient is a 76y old  Male who presents with a chief complaint of R Foot Pain (17 Oct 2021 20:59)    PATIENT IS SEEN AND EXAMINED IN MEDICAL FLOOR.    ALLERGIES:  No Known Allergies    VITALS:    Vital Signs Last 24 Hrs  T(C): 36.4 (18 Oct 2021 05:08), Max: 37.1 (17 Oct 2021 13:30)  T(F): 97.6 (18 Oct 2021 05:08), Max: 98.7 (17 Oct 2021 13:30)  HR: 90 (18 Oct 2021 05:08) (69 - 90)  BP: 148/71 (18 Oct 2021 05:08) (122/66 - 153/66)  BP(mean): --  RR: 18 (18 Oct 2021 05:08) (18 - 18)  SpO2: 97% (18 Oct 2021 05:08) (97% - 97%)    LABS:    CBC Full  -  ( 18 Oct 2021 07:50 )  WBC Count : 4.51 K/uL  RBC Count : 3.07 M/uL  Hemoglobin : 9.1 g/dL  Hematocrit : 27.6 %  Platelet Count - Automated : 238 K/uL  Mean Cell Volume : 89.9 fl  Mean Cell Hemoglobin : 29.6 pg  Mean Cell Hemoglobin Concentration : 33.0 gm/dL  Auto Neutrophil # : x  Auto Lymphocyte # : x  Auto Monocyte # : x  Auto Eosinophil # : x  Auto Basophil # : x  Auto Neutrophil % : x  Auto Lymphocyte % : x  Auto Monocyte % : x  Auto Eosinophil % : x  Auto Basophil % : x      10-18    139  |  107  |  17  ----------------------------<  128<H>  3.9   |  23  |  0.97    Ca    8.9      18 Oct 2021 07:50  Mg     2.1     10-18    TPro  6.7  /  Alb  2.3<L>  /  TBili  0.8  /  DBili  x   /  AST  39  /  ALT  81<H>  /  AlkPhos  134<H>  10-17    CAPILLARY BLOOD GLUCOSE      POCT Blood Glucose.: 128 mg/dL (18 Oct 2021 07:38)  POCT Blood Glucose.: 113 mg/dL (18 Oct 2021 05:50)  POCT Blood Glucose.: 91 mg/dL (17 Oct 2021 21:14)  POCT Blood Glucose.: 126 mg/dL (17 Oct 2021 17:22)  POCT Blood Glucose.: 85 mg/dL (17 Oct 2021 12:20)        LIVER FUNCTIONS - ( 17 Oct 2021 06:20 )  Alb: 2.3 g/dL / Pro: 6.7 g/dL / ALK PHOS: 134 U/L / ALT: 81 U/L DA / AST: 39 U/L / GGT: x           Creatinine Trend: 0.97<--, 1.05<--, 0.97<--, 0.99<--, 0.99<--, 1.31<--  I&O's Summary    17 Oct 2021 07:01  -  18 Oct 2021 07:00  --------------------------------------------------------  IN: 0 mL / OUT: 2000 mL / NET: -2000 mL            .Body Fluid Pleural Fluid  10-08 @ 18:51   No growth at 1 week.  --  --      .Body Fluid Pleural Fluid  10-08 @ 18:34   No growth at 5 days  --    polymorphonuclear leukocytes seen  No organisms seen  by cytocentrifuge      .Tissue Right 5th toe r/o osteomyeltis  09-22 @ 13:54   No growth at 5 days  --    No polymorphonuclear cells seen per low power field  No organisms seen per oil power field      .Blood Blood-Venous  09-17 @ 10:28   No Growth Final  --  --      .Surgical Swab right foot wound  09-16 @ 21:42   Culture yields >4 types of aerobic and/or anaerobic bacteria  Call client services within 7 days if further workup is clinically  indicated. Culture includes  Rare Aeromonas hydrophila/caviae  Few Klebsiella oxytoca/Raoutella ornithinolytica  Few Enterococcus faecalis  Few Streptococcus agalactiae (Group B) isolated  Group B streptococci are susceptible to ampicillin,  penicillin and cefazolin, but may be resistant to  erythromycin and clindamycin.  Recommendations for intrapartum prophylaxis for Group B  streptococci are penicillin or ampicillin.  --  Aeromonas hydrophila/caviae  Klebsiella oxytoca /Raoutella ornithinolytica  Enterococcus faecalis      Bone R 5th metatarsal bone clean ma  08-20 @ 03:59   No growth at 5 days  --    No polymorphonuclear leukocytes seen per low power field  No organisms seen per oil power field      .Blood Blood-Venous  08-14 @ 21:09   No Growth Final  --  --      .Surgical Swab Right foot plantar wound  08-14 @ 21:08   Moderate Streptococcus agalactiae (Group B) isolated  Group B streptococci are susceptible to ampicillin,  penicillin and cefazolin, but may be resistant to  erythromycin and clindamycin.  Recommendations for intrapartum prophylaxis for Group B  streptococci are penicillin or ampicillin.  Moderate Bacteroides fragilis "Susceptibilities not performed"  Normal skin filiberto isolated  --  --          MEDICATIONS:    MEDICATIONS  (STANDING):  apixaban 5 milliGRAM(s) Oral two times a day  aspirin  chewable 81 milliGRAM(s) Oral daily  atorvastatin 80 milliGRAM(s) Oral at bedtime  chlorhexidine 2% Cloths 1 Application(s) Topical <User Schedule>  collagenase Ointment 1 Application(s) Topical daily  cyanocobalamin 1000 MICROGram(s) Oral daily  dextrose 5% + sodium chloride 0.9%. 1000 milliLiter(s) (75 mL/Hr) IV Continuous <Continuous>  ergocalciferol 95153 Unit(s) Oral <User Schedule>  ferrous    sulfate Liquid 300 milliGRAM(s) Enteral Tube daily  finasteride 5 milliGRAM(s) Oral daily  gabapentin 100 milliGRAM(s) Oral three times a day  insulin lispro (ADMELOG) corrective regimen sliding scale   SubCutaneous every 6 hours  meropenem  IVPB 1000 milliGRAM(s) IV Intermittent every 12 hours  metoprolol tartrate 25 milliGRAM(s) Oral two times a day  NIFEdipine XL 60 milliGRAM(s) Oral daily  pantoprazole  Injectable 40 milliGRAM(s) IV Push at bedtime      MEDICATIONS  (PRN):  ondansetron Injectable 4 milliGRAM(s) IV Push three times a day PRN Nausea and/or Vomiting  sodium chloride 0.9% lock flush 10 milliLiter(s) IV Push every 1 hour PRN Pre/post blood products, medications, blood draw, and to maintain line patency      REVIEW OF SYSTEMS:                           ALL ROS DONE [ X   ]    CONSTITUTIONAL:  LETHARGIC [   ], FEVER [   ], UNRESPONSIVE [   ]  CVS:  CP  [   ], SOB, [   ], PALPITATIONS [   ], DIZZYNESS [   ]  RS: COUGH [   ], SPUTUM [   ]  GI: ABDOMINAL PAIN [   ], NAUSEA [   ], VOMITINGS [   ], DIARRHEA [   ], CONSTIPATION [   ]  :  DYSURIA [   ], NOCTURIA [   ], INCREASED FREQUENCY [   ], DRIBLING [   ],  SKELETAL: PAINFUL JOINTS [   ], SWOLLEN JOINTS [   ], NECK ACHE [   ], LOW BACK ACHE [   ],  SKIN : ULCERS [   ], RASH [   ], ITCHING [   ]  CNS: HEAD ACHE [   ], DOUBLE VISION [   ], BLURRED VISION [   ], AMS / CONFUSION [   ], SEIZURES [   ], WEAKNESS [   ],TINGLING / NUMBNESS [   ]    PHYSICAL EXAMINATION:    GENERAL APPEARANCE: NO DISTRESS  HEENT:  NO PALLOR, NO  JVD,  NO   NODES, NECK SUPPLE  CVS: S1 +, S2 +,   RS: AEEB,  OCCASIONAL  RALES +,  RONCHI +, CRACKLES + [IMPROVING]   ABD: SOFT, NT, NO, BS +       EXT: NO PE  SKIN: WARM,   RIGHT FOOT WRAPPED  SKELETAL:  ROM ACCEPTABLE  CNS:  AAO X  2-3        RADIOLOGY :    < from: Transthoracic Echocardiogram (10.07.21 @ 15:26) >  CONCLUSIONS:  1. Normal mitral valve. Moderate mitral regurgitation.  2. Calcified aortic valve with decreased opening, cannot  exclude bicuspid. Peak transaortic valve gradient equals  32.4 mm Hg, mean transaortic valve gradient equals 19 mm  Hg, dimensionless index 0.22, estimated aortic valve area  equals 0.6sqcm (by continuity equation), consistent with  paradoxical low-flow low-gradient severe aortic stenosis.  Consider dobutamine stress echocardiogram and/or SABIHA for  further evaluation.  No aortic valve regurgitation seen.  3. Normal aortic root.  4. Normal left atrium.  5. Moderate concentric left ventricular hypertrophy.  6. Moderate global left ventricular systolic dysfunction  (EF 40-45% by visual estimation).  7. Grade II diastolic dysfunction (moderate).  8. Normal right atrium.  9. Normal right ventricular size with decreased RV systolic  function (TAPSE 1.2 cm).  10. RV systolic pressure is borderline normal at  34 mm Hg.  11. Tricuspid valve not well seen. Trace tricuspid  regurgitation.  12. Normal pulmonic valve. Trace pulmonic insufficiency is  noted.  13. No pericardial effusion.  14. Bilateral pleural effusions.    < end of copied text >      EXAM:  CT ABDOMEN AND PELVIS OC                            PROCEDURE DATE:  09/27/2021          INTERPRETATION:  CLINICAL INFORMATION: Small bowel obstruction.    COMPARISON: None.    CONTRAST/COMPLICATIONS:  IV Contrast: NONE  Oral Contrast: Omnipaque 300  Complications: None reported at time of study completion    PROCEDURE:  CT of the Abdomen and Pelvis was performed.  Sagittal and coronal reformats were performed.    FINDINGS:  LOWER CHEST: Small bilateral pleural effusions with compressiveatelectasis of both lower lobes.    LIVER: Within normal limits.  BILE DUCTS: Normal caliber.  GALLBLADDER: Distended. Small layering gallstones.  SPLEEN: Within normal limits.  PANCREAS: Within normal limits.  ADRENALS: Within normal limits.  KIDNEYS/URETERS: No hydronephrosis. Nonobstructing left upper pole calculus, measuring 0.6 cm.    BLADDER: Urinary bladder contains air and a Castañeda catheter balloon.  REPRODUCTIVE ORGANS: Prostate is not enlarged.    BOWEL: No bowel obstruction. Appendix isnormal. Colonic diverticulosis.  PERITONEUM: Mild presacral fluid.  VESSELS: Atherosclerotic changes.  RETROPERITONEUM/LYMPH NODES: No lymphadenopathy.  ABDOMINAL WALL: Within normal limits.  BONES: Degenerative changes. Sternotomy.    IMPRESSION:  No acute intra-abdominal pathology.    Small bilateral pleural effusions with compressive atelectasis of both lower lobes.    ====================================================    EXAM:  MR FOOT RT                            PROCEDURE DATE:  09/17/2021          INTERPRETATION:  Clinical Information: Recent fifth metatarsal head resection now with fifth digit pain, swelling and foul discharge.    Comparison: Radiographs of the right foot from 9/16/2021 and MRI the right foot from 8/16/2021.    Technique:  MRI of the right midfoot and forefoot.  Intravenous Contrast: None.    Findings:    There is a resection at the mid aspect of the fifth metatarsal shaft. There is a lateral soft tissue wound beginning at the level of the amputation which extends distally. There is susceptibility artifact in the region of the amputation consistent with postoperative change. There is hyperintense T2 marrow signal throughout the remaining fifth metatarsal and within the adjacent fourth metatarsal head which is nonspecific and could be related to recent postoperative changes although osteomyelitis is suspected.    There is edema and mild atrophy within the plantar muscles of the foot. There is minimal spurring at the first metatarsophalangeal and hallux sesamoid articulations.    Impression:  Resection at the mid aspect of the fifth metatarsal. Lateral soft tissue wound with marrow signal abnormality throughout the fifth metatarsal and within the adjacent fourth metatarsal head which is nonspecific in the setting of recent surgery although osteomyelitis is suspected.        ASSESSMENT :     Headache    HTN (hypertension)    HLD (hyperlipidemia)    DM (diabetes mellitus)    CAD (coronary artery disease)    Glaucoma, angle-closure    [place] (hard of hearing)      S/P CABG (coronary artery bypass graft)    History of thyroid surgery        PLAN:    HPI:  76M from home, lives with daughter w/ PMH DM on insulin, HTN, HLD, CAD, 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 Pt with sharp pain on the R lateral foot. As per daughter, pt was taking tylenol which did not help his pain prompting the patient to ask his daughter to take him to the hospital. Pt is a poor historian. Daughter states that the patient did not see his podiatrist after the surgery. No fever, chest, pain, palpitations, nausea, vomiting, diarrhea (17 Sep 2021 01:11)      # PATIENT IS S/P ICU MONITORING AND RIGHT CHEST TUBE REMOVAL ON 10/15/2021     # COVID EXPOSURE ON 10/13 - ON CONTACT AND DROPLET ISOLATION PRECAUTION; F/U COVID PCR    # SUSPECT RIGHT FOOT OSTEOMYELITIS S/P RIGHT 5TH RAY RESECTION [8/19] AND S/P DEBRIDEMENT, GRAFT APPLICATION, BONE BX AND WOUND VAC APPLICATION 9/22 - BONE BX W/ ACUTE OSTEOMYELITIS AND NECROTIC PERIOSTEAL TISSUE  ** RECENT ADMISSION FOR RIGHT DIABETIC FOOT ULCER, CELLULITIS, OSTEOMYELITIS RIGHT 5th METATARSAL S/P RIGHT 5TH PARTIAL RAY AMPUTATION [8/20] - BONE PATHOLOGY W/ CLEAN MARGINS    - S/P VANCOMYCIN AND ZOSYN ; NOW ON UNASYN AND LEVAQUIN GIVEN OREN; PER ID SWITCH TO ZOSYN UNTIL 11/3 [NOW ON MEROPENEM]  - RECENTLY HAD SIMON W/ MILD PAD  - REVIEWED WOUND CX [ ENTEROCOCCUS, AEROMONAS, KLEBSIELLA] AND BCX  - ID CONSULT, PODIATRY CONSULT    - PICC LINE PLACED TO COMPLETE 6 WEEKS OF ANTIBIOTICS - ON MEROPENEM UNTIL 11/3 ON D/C    # ACUTE HYPOXIC RESPIRATORY FAILURE DUE TO ACUTE ON CHRONIC SYSTOLIC CHF  (  LV EF 40- 45 % ) , DIASTOLIC LV DYSFUNCTION , ,  SEVERE AORTIC STENOSIS & MODERATE MITRAL REGURGITATION  &  ASPIRATION PNA;  - S/P CHEST TUBE INSERTION AND REMOVAL  , S/P LASIX ,   CARDIOLOGY CONSULT IN PROGRESS      - CHEST TUBE PLACED [10/8] - FLUID CONSISTENT WITH TRANSUDATIVE PROCESS  - S/P EXTUBATION 10/13  - S/P CHEST TUBE REMOVAL 10/15      # SEPTIC SHOCK - ? S/T HYPOVOLEMIA VS. SEPSIS - S/P CVC [SWITCHED FROM FEMORAL TO LEFT IJ], S/P VASOPRESSORS - RESOLVING  - BCX - NGTD  - ON MEROPENEM      # TRANSIENT NEW ONSET A.FIB, PAROXYSMAL - ON ELIQUIS, CARDIOLOGY CONSULT IN PROGRESS    # FUNGURIA - D/C FLUCONAZOLE GIVEN TRANSAMINITIS    # TRANSAMINITIS - SUSPECT THIS IS ISCHEMIC HEPATITIS - IMPROVING - HEPATOLOGY CONSULT IN PROGRESS    # BOWEL DISTENTION - ? ILEUS - OPTIMIZING ELECTROLYTES - IMPROVED  - SURGERY CONSULT IN PROGRESS    # CHOLELITHIASIS - TRENDING LFTS, RUQ U/S - CHOLELITHIASIS, SURGERY CONSULT IN PROGRESS  - GI CONSULT IN PROGRESS - LESS SUSPICIOUS FOR CHOLECYSTITIS    # DYSPEPSIA - PLACED ON PPI BID WITH IMPROVEMENT, UNDERWENT ST EVAL     # ELEVATED TROPONINS - NSTEMI - ? DEMAND - WILL NEED ISCHEMIC EVAL ONCE ACUTE INFECTION RESOLVES PER CARDIOLOGY, CARDIOLOGY CONSULT IN PROGRESS  - ON ASA, STATIN, BB    # OREN ON CKD - S/T ATN AND URINARY RETENTION - S/P IVF, NOW W/ CASTAÑEDA, NEPHROLOGY CONUSLT IN PROGRESS  -  BPH ON FLOMAX, AND FINASTERIDE  - FAILED TOV WITH WORSENING RENAL FXN - NOTED URINARY RETENTION [10/4] - REPLACED CASTAÑEDA  - TOV 10/18 - BLADDER SCAN BID TO EVALUATE FOR URINARY RETENTION    # MEDICAL CLEARANCE FOR SURGERY - RCRI - 2 - 10.1 % 30 DAY RISK OF DEATH, MI OR CARDIAC ARREST  - CARDIOLOGY CONSULT     # DM -  HBA1C - 11  - LANTUS, SSI + FS    # CAD S/P CABG - PLACED ON ASA, STATIN, BB  - ECHO - SEVERE AORTIC STENOSIS, SEVERE CONCENTRIC LVH, G1DD, MILD NE  - CARDIOLOGY CONSULT - DR. BASSETT   - MAY NEED ISCHEMIC EVAL ONCE ACUTE ILLNESS RESOLVES INCLUDING CARDIAC CATHETERIZATION    # HTN, HLD  - ON METOPROLOL, NIFEDIPINE, STATIN       #  IMPAIRED GAIT DUE TO CERVICAL & LS SPONDYLOSIS, POLY ARTHRITIS AND DIABETIC PERIPHERAL NEUROPATHY, OP VITAMIN D DEFICIENCY - ON CHOLECALCIFEROL, OBTAIN PT & OT   #  FAILURE TO THRIVE , MODERATE PROTEIN CALORIE MALNUTRITION       # CASE DISCUSSED AT LENGTH WITH PATIENT AND DAUGHTER, BIRDIE ROBERT AT BEDSIDE AND VIA PHONE @ [phone] [10/5] - CASE DICUSSED AT LENGTH AND ALL QUESTIONS WERE ANSWERED. DAUGHTER UNDERSTOOD THAT PROGNOSIS IS GUARDED.  # PATIENT AND DAUGHTER REFUSED [facility] ON PREVIOUS ADMISSION, PREVIOUSLY WISHED FOR PATIENT RETURN HOME W/ HOME PT; HOWEVER NOW, DAUGHTER WISHES FOR PATIENT TO GO TO [facility] - [facility], AS PATIENT'S WIFE IS CURRENTLY ADMITTED THERE    # GI AND DVT PPX

## 2021-10-18 NOTE — PROGRESS NOTE ADULT - ASSESSMENT
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 s/p OR debridement today. Nephrology consulted for abrupt rise in SCr.     1. OREN- Recurrent ATN in the setting of infection. Lisinopril discontinued 9/22. ATN resolving with good urine o/p. Now off diuretics; likely recovery phase   s/p CT chest with b/l mod to large pleural effusion R>L. s/p rt pigtail catheter. Monitor lytes  Kidney/ Bladder US with large distended bladder s/p ibrahim placement on 9/23, then removed. s/p bladder scan 10/4 with 1L urine for which ibrahim was reinserted; however; renal function did not improve post ibrahim; less likely due to obstructive uropathy.  No peripheral eosinophilia- no signs of AIN. C3 & C4 wnl with neg ASO- no signs of PIGN. Strict I/Os. Avoid nephrotoxins/ NSAIDs/ RCA. Monitor BMP.    2. 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 Will defer secondary w/u as an outpt. Avoid nephrotoxins  3. HTN 2/2 CKD- BP borderline. Continue with current anti-hypertensive medications. Monitor BP.  4. Acute osteomyelitis- s/p debridement 9/22. Pt now on meropenem. Vanco held. Plan as per ID      [facility] NEPHROLOGY  Bryn Johnson M.D.  Domingo Booth D.O.  Zakia Rodriguez M.D.  Zonia Adams, MSN, ANP-C  [phone]    [address] [place] [address]

## 2021-10-18 NOTE — PROGRESS NOTE ADULT - PROBLEM SELECTOR PLAN 6
HBA1C - 11  - LANTUS, SSI + FS HBA1C - 11.4 Aug/21  Insulin Sliding Scale  Accu check ACHS  controlled

## 2021-10-18 NOTE — PROGRESS NOTE ADULT - PROBLEM SELECTOR PLAN 8
S/T ATN AND URINARY RETENTION - S/P IVF, NOW W/ MADDY, NEPHROLOGY CONSULT IN PROGRESS  - ON FLOMAX, ADDED FINASTERIDE OREN resolved  SCr 0.94  ibrahim placed due to urinary retention- TOV today  c/w bladder scan BID

## 2021-10-18 NOTE — PROGRESS NOTE ADULT - SUBJECTIVE AND OBJECTIVE BOX
C A R D I O L O G Y  **********************************    DATE OF SERVICE: 10-18-21    Patient denies chest pain or shortness of breath.   Review of symptoms otherwise negative.    apixaban 5 milliGRAM(s) Oral two times a day  aspirin  chewable 81 milliGRAM(s) Oral daily  atorvastatin 80 milliGRAM(s) Oral at bedtime  chlorhexidine 2% Cloths 1 Application(s) Topical <User Schedule>  collagenase Ointment 1 Application(s) Topical daily  cyanocobalamin 1000 MICROGram(s) Oral daily  ergocalciferol 88780 Unit(s) Oral <User Schedule>  ferrous    sulfate Liquid 300 milliGRAM(s) Enteral Tube daily  finasteride 5 milliGRAM(s) Oral daily  gabapentin 100 milliGRAM(s) Oral three times a day  insulin lispro (ADMELOG) corrective regimen sliding scale   SubCutaneous every 6 hours  meropenem  IVPB 1000 milliGRAM(s) IV Intermittent every 12 hours  metoprolol tartrate 25 milliGRAM(s) Oral two times a day  NIFEdipine XL 60 milliGRAM(s) Oral daily  ondansetron Injectable 4 milliGRAM(s) IV Push three times a day PRN  pantoprazole  Injectable 40 milliGRAM(s) IV Push at bedtime  sodium chloride 0.9% lock flush 10 milliLiter(s) IV Push every 1 hour PRN                            9.1    4.51  )-----------( 238      ( 18 Oct 2021 07:50 )             27.6       Hemoglobin: 9.1 g/dL (10-18 @ 07:50)  Hemoglobin: 9.5 g/dL (10-17 @ 06:20)  Hemoglobin: 8.7 g/dL (10-16 @ 06:33)  Hemoglobin: 8.4 g/dL (10-16 @ 04:04)  Hemoglobin: 8.4 g/dL (10-15 @ 06:39)      10-18    139  |  107  |  17  ----------------------------<  128<H>  3.9   |  23  |  0.97    Ca    8.9      18 Oct 2021 07:50  Mg     2.1     10-18    TPro  6.7  /  Alb  2.3<L>  /  TBili  0.8  /  DBili  x   /  AST  39  /  ALT  81<H>  /  AlkPhos  134<H>  10-17    Creatinine Trend: 0.97<--, 1.05<--, 0.97<--, 0.99<--, 0.99<--, 1.31<--    COAGS:           T(C): 36.4 (10-18-21 @ 05:08), Max: 37.1 (10-17-21 @ 13:30)  HR: 90 (10-18-21 @ 05:08) (69 - 90)  BP: 148/71 (10-18-21 @ 05:08) (122/66 - 153/66)  RR: 18 (10-18-21 @ 05:08) (18 - 18)  SpO2: 97% (10-18-21 @ 05:08) (97% - 97%)  Wt(kg): --    I&O's Summary    17 Oct 2021 07:01  -  18 Oct 2021 07:00  --------------------------------------------------------  IN: 0 mL / OUT: 2000 mL / NET: -2000 mL          HEENT:  (-)icterus (-)pallor  CV: N S1 S2 1/6 TRINIDAD (+)2 Pulses B/l  Resp:  Coarse sounds B/L, normal effort  GI: (+) BS Soft, NT, ND  Lymph:  (-)Edema, (-)obvious lymphadenopathy  Skin: Warm to touch, Normal turgor  Psych: Unable to adequately  mood and affect    Tele: SR      ASSESSMENT/PLAN: 	76y  Male PMH DM on insulin, HTN, HLD, normal lV fx moderate to severe AS 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    - crt improved  - Abx per primary team, will need PICC  - Echo noted, Severe AS, EF 40-45%   - He will need a SABIHA and R+L heart cath when he recovers and has no active infection  - Cont medical management of CAD  - Pt. with new onset PAF now on Eliquis  - Pig tail removed ojn 10/16  - cont supportive care     Zak Wilkins MD, [facility]  BEEPER [phone]

## 2021-10-18 NOTE — PROGRESS NOTE ADULT - PROBLEM SELECTOR PLAN 9
CAD S/P CABG - PLACED ON ASA, STATIN, BB  - ECHO - SEVERE AORTIC STENOSIS, SEVERE CONCENTRIC LVH, G1DD, MILD HI  - CARDIOLOGY CONSULT - DR. BASSETT   - MAY NEED ISCHEMIC EVAL ONCE ACUTE ILLNESS RESOLVES INCLUDING CARDIAC CATHETERIZATION  -SEVERE, ASYMPTOMATIC, STENOSIS - ONCE ACUTE ILLNESS RESOLVES, WILL LIKELY NEED ISCHEMIC WORKUP, SABIHA TO EVALUATE FURTHER see ECHO as above  will need outpatient cardiac catheterization once infection treated  Cardiologiy Dr. Wilkins

## 2021-10-18 NOTE — CHART NOTE - NSCHARTNOTEFT_GEN_A_CORE
EVENT: Notified by nurse regarding patient failed TOV and bladder scan > 498    HPI: 76y  Male PMH DM on insulin, HTN, HLD, normal lV fx moderate to severe AS 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 MRI confirms suspected osteomyelitis - patient followed by podiatry and ID, patient underwent debridement and wound VAC placement and removed in OR,.  was treated with Unasyn and Levofloxacin, ID. Surgical pathology resulted OM. Wound culture resulting Enterococcus Aeromonal and Klebsiella- treated initially with Zosyn however hospital course complicated with OREN likely mixed etiology with prerenal from active infection and pulmonary edema, intrarenal due to toxicity from IV antibiotics and post renal from urinary retention. V antibiotic regimen switched to Meropenem, sensitive for organism, will need 6 weeks of IV antibiotics via PICC, until 11/3. IR guided PICC placed to Left arm. Molina placed due to urinary retention- TOV today      SUBJECTIVE: Patient Molina Catheter discharged today - patient failed TOV and bladder scan 498. Patient denies any discomfort and also the urge to urinate    OBJECTIVE    Vital Signs Last 24 Hrs  T(C): 35.9 (18 Oct 2021 20:47), Max: 36.5 (18 Oct 2021 13:10)  T(F): 96.6 (18 Oct 2021 20:47), Max: 97.7 (18 Oct 2021 13:10)  HR: 75 (18 Oct 2021 20:47) (75 - 90)  BP: 128/50 (18 Oct 2021 20:47) (128/50 - 148/71)  BP(mean): --  RR: 18 (18 Oct 2021 20:47) (18 - 20)  SpO2: 100% (18 Oct 2021 20:47) (97% - 100%)    PHYSICAL EXAM:  GENERAL: NAD  HEENT: Normocephalic;  conjunctivae and sclerae clear; moist mucous membranes;   NECK : supple  CHEST/LUNG: Clear to auscultation bilaterally with good air entry   HEART: S1 S2  regular; no murmurs, gallops or rubs  ABDOMEN: Soft, Nontender, Nondistended; Bowel sounds present  EXTREMITIES: right foot ace bandage, no cyanosis; no edema; no calf tenderness  SKIN: right foot ulceration, perianal wound  NERVOUS SYSTEM:  Awake and alert; Oriented  to person    MEDS  apixaban 5 milliGRAM(s) Oral two times a day  aspirin  chewable 81 milliGRAM(s) Oral daily  atorvastatin 80 milliGRAM(s) Oral at bedtime  chlorhexidine 2% Cloths 1 Application(s) Topical <User Schedule>  collagenase Ointment 1 Application(s) Topical daily  cyanocobalamin 1000 MICROGram(s) Oral daily  ergocalciferol 10117 Unit(s) Oral <User Schedule>  ferrous    sulfate Liquid 300 milliGRAM(s) Enteral Tube daily  finasteride 5 milliGRAM(s) Oral daily  gabapentin 100 milliGRAM(s) Oral three times a day  insulin lispro (ADMELOG) corrective regimen sliding scale   SubCutaneous every 6 hours  meropenem  IVPB 1000 milliGRAM(s) IV Intermittent every 12 hours  metoprolol tartrate 25 milliGRAM(s) Oral two times a day  NIFEdipine XL 60 milliGRAM(s) Oral daily  ondansetron Injectable 4 milliGRAM(s) IV Push three times a day PRN  pantoprazole  Injectable 40 milliGRAM(s) IV Push at bedtime  sodium chloride 0.9% lock flush 10 milliLiter(s) IV Push every 1 hour PRN    LABS:                        9.1    4.51  )-----------( 238      ( 18 Oct 2021 07:50 )             27.6     10-18    139  |  107  |  17  ----------------------------<  128<H>  3.9   |  23  |  0.97    Ca    8.9      18 Oct 2021 07:50  Mg     2.1     10-18    TPro  6.7  /  Alb  2.3<L>  /  TBili  0.8  /  DBili  x   /  AST  39  /  ALT  81<H>  /  AlkPhos  134<H>  10-17      PROBLEM: Urinary retention - patient failed TOV  PLAN:   Reinsert Molina Cath  monitor I&O  Supportive care    FOLLOW UP / RESULT: Monitor for effectiveness of above intervention

## 2021-10-18 NOTE — PROGRESS NOTE ADULT - SUBJECTIVE AND OBJECTIVE BOX
Chief Complaint:  Patient is a 76y old  Male who presents with a chief complaint of R Foot Pain (18 Oct 2021 10:12)      Reason for consult: Abnormal liver enzymes    Interval Events: Liver enzymes continued to improve, AST 39, ALT 81, , Se bi 0.8    Hospital Medications:  apixaban 5 milliGRAM(s) Oral two times a day  aspirin  chewable 81 milliGRAM(s) Oral daily  atorvastatin 80 milliGRAM(s) Oral at bedtime  chlorhexidine 2% Cloths 1 Application(s) Topical <User Schedule>  collagenase Ointment 1 Application(s) Topical daily  cyanocobalamin 1000 MICROGram(s) Oral daily  dextrose 5% + sodium chloride 0.9%. 1000 milliLiter(s) IV Continuous <Continuous>  ergocalciferol 28417 Unit(s) Oral <User Schedule>  ferrous    sulfate Liquid 300 milliGRAM(s) Enteral Tube daily  finasteride 5 milliGRAM(s) Oral daily  gabapentin 100 milliGRAM(s) Oral three times a day  insulin lispro (ADMELOG) corrective regimen sliding scale   SubCutaneous every 6 hours  meropenem  IVPB 1000 milliGRAM(s) IV Intermittent every 12 hours  metoprolol tartrate 25 milliGRAM(s) Oral two times a day  NIFEdipine XL 60 milliGRAM(s) Oral daily  ondansetron Injectable 4 milliGRAM(s) IV Push three times a day PRN  pantoprazole  Injectable 40 milliGRAM(s) IV Push at bedtime  sodium chloride 0.9% lock flush 10 milliLiter(s) IV Push every 1 hour PRN      ROS:   General:  No  fevers, chills, night sweats, fatigue  Eyes:  Good vision, no reported pain  ENT:  No sore throat, pain, runny nose  CV:  No pain, palpitations  Pulm:  No dyspnea, cough  GI:  See HPI, otherwise negative  :  No  incontinence, nocturia  Muscle:  No pain, weakness  Neuro:  No memory problems  Psych:  No insomnia, mood problems, depression  Endocrine:  No polyuria, polydipsia, cold/heat intolerance  Heme:  No petechiae, ecchymosis, easy bruisability  Skin:  No rash    PHYSICAL EXAM:   Vital Signs:  Vital Signs Last 24 Hrs  T(C): 36.4 (18 Oct 2021 05:08), Max: 37.1 (17 Oct 2021 13:30)  T(F): 97.6 (18 Oct 2021 05:08), Max: 98.7 (17 Oct 2021 13:30)  HR: 90 (18 Oct 2021 05:08) (69 - 90)  BP: 148/71 (18 Oct 2021 05:08) (122/66 - 153/66)  BP(mean): --  RR: 18 (18 Oct 2021 05:08) (18 - 18)  SpO2: 97% (18 Oct 2021 05:08) (97% - 97%)  Daily     Daily     GENERAL: no acute distress  NEURO: alert, no asterixis  HEENT: anicteric sclera, no conjunctival pallor appreciated  CHEST: no respiratory distress, no accessory muscle use  CARDIAC: regular rate, rhythm  ABDOMEN: soft, non-tender, non-distended, no rebound or guarding  EXTREMITIES: warm, well perfused, no edema  SKIN: no lesions noted    LABS: reviewed                        9.1    4.51  )-----------( 238      ( 18 Oct 2021 07:50 )             27.6     10-18    139  |  107  |  17  ----------------------------<  128<H>  3.9   |  23  |  0.97    Ca    8.9      18 Oct 2021 07:50  Mg     2.1     10-18    TPro  6.7  /  Alb  2.3<L>  /  TBili  0.8  /  DBili  x   /  AST  39  /  ALT  81<H>  /  AlkPhos  134<H>  10-17    LIVER FUNCTIONS - ( 17 Oct 2021 06:20 )  Alb: 2.3 g/dL / Pro: 6.7 g/dL / ALK PHOS: 134 U/L / ALT: 81 U/L DA / AST: 39 U/L / GGT: x             Interval Diagnostic Studies: see sunrise for full report   Chief Complaint:  Patient is a 76y old  Male who presents with a chief complaint of R Foot Pain (18 Oct 2021 10:12)      Reason for consult: Abnormal liver enzymes    Interval Events: Patient was seen and examined at bedside. Liver enzymes continued to improve, AST 39, ALT 81, , Se bi 0.8    Hospital Medications:  apixaban 5 milliGRAM(s) Oral two times a day  aspirin  chewable 81 milliGRAM(s) Oral daily  atorvastatin 80 milliGRAM(s) Oral at bedtime  chlorhexidine 2% Cloths 1 Application(s) Topical <User Schedule>  collagenase Ointment 1 Application(s) Topical daily  cyanocobalamin 1000 MICROGram(s) Oral daily  dextrose 5% + sodium chloride 0.9%. 1000 milliLiter(s) IV Continuous <Continuous>  ergocalciferol 90991 Unit(s) Oral <User Schedule>  ferrous    sulfate Liquid 300 milliGRAM(s) Enteral Tube daily  finasteride 5 milliGRAM(s) Oral daily  gabapentin 100 milliGRAM(s) Oral three times a day  insulin lispro (ADMELOG) corrective regimen sliding scale   SubCutaneous every 6 hours  meropenem  IVPB 1000 milliGRAM(s) IV Intermittent every 12 hours  metoprolol tartrate 25 milliGRAM(s) Oral two times a day  NIFEdipine XL 60 milliGRAM(s) Oral daily  ondansetron Injectable 4 milliGRAM(s) IV Push three times a day PRN  pantoprazole  Injectable 40 milliGRAM(s) IV Push at bedtime  sodium chloride 0.9% lock flush 10 milliLiter(s) IV Push every 1 hour PRN      ROS: Limited due to patient condition.   General:  No  fevers, chills  CV:  No pain, palpitations  Pulm:  No dyspnea, cough  GI:  No abdominal pain, no N/V, reports decreased appetite, unable to tell if had BM  :  No  dysuria  Neuro:  Awake, alert, oriented to self, but not in time (able to tell )  Skin:  RLE wound w dresing    PHYSICAL EXAM:   Vital Signs:  Vital Signs Last 24 Hrs  T(C): 36.4 (18 Oct 2021 05:08), Max: 37.1 (17 Oct 2021 13:30)  T(F): 97.6 (18 Oct 2021 05:08), Max: 98.7 (17 Oct 2021 13:30)  HR: 90 (18 Oct 2021 05:08) (69 - 90)  BP: 148/71 (18 Oct 2021 05:08) (122/66 - 153/66)  BP(mean): --  RR: 18 (18 Oct 2021 05:08) (18 - 18)  SpO2: 97% (18 Oct 2021 05:08) (97% - 97%)  Daily     Daily     GENERAL: no acute distress  NEURO: awake, alert, oriented to place, and self, not to time, no asterixis  HEENT: anicteric sclera, no conjunctival pallor appreciated  CHEST: no respiratory distress, no accessory muscle use  CARDIAC: regular rate, rhythm  ABDOMEN: soft, non-tender, non-distended, no rebound or guarding, BS+  EXTREMITIES: warm, well perfused, no edema, RLE dressing      LABS: reviewed                        9.1    4.51  )-----------( 238      ( 18 Oct 2021 07:50 )             27.6     10-18    139  |  107  |  17  ----------------------------<  128<H>  3.9   |  23  |  0.97    Ca    8.9      18 Oct 2021 07:50  Mg     2.1     10-18    TPro  6.7  /  Alb  2.3<L>  /  TBili  0.8  /  DBili  x   /  AST  39  /  ALT  81<H>  /  AlkPhos  134<H>  10-17    LIVER FUNCTIONS - ( 17 Oct 2021 06:20 )  Alb: 2.3 g/dL / Pro: 6.7 g/dL / ALK PHOS: 134 U/L / ALT: 81 U/L DA / AST: 39 U/L / GGT: x             Interval Diagnostic Studies: see sunrise for full report

## 2021-10-18 NOTE — PROGRESS NOTE ADULT - ASSESSMENT
77yo Male with Hx of HTN, HLD, CAD, s/p CABG, severe AS, PAD, s/p R partial 5th ray amputation (8/19/21) presented to [facility] ED on 9/16/21 with R foot pain and foul drainage a/w diabetic foot ulcer  (wound Cx grew Enterococcus, Aeromonas, Klebsiella, Group B Strep 9/16). He was found to have acute osteomyelitis,  s/p OR debridement, bone biopsy, wound vac on 9/22/21. Also noted OREN, and acute hypoxic respiratory failure due to pulmonary edema in setting of severe AS + aspirational PNA, as well as pleural effusions, required intubation on 10/7, became hemodynamically unstable, requiring pressor support 10/7 - 10/11.   Hepatology was consulted on 10/8 for acute, severe, hepatocellular liver injury, with intact function and cholestatic parameters, suspected likely due to ischemic injury, vs. DILI. The sudden acute rise and significant improvement (AST became half in a day), rather supported the ischemic injury.   Transaminases continued to improve: AST 39 <--1057, ALT 81<--559. ALP has been elevated since 10/10, but improving 180-> 134, bilirubin remains normal.     - C/w monitoring of LFTs, including INR  - Avoid hepatotoxic medications when medically feasible (fluconazole and levofloxacin was d/c on 10/8)  - C/w supportive measures per primary team  - C/w antibiotics per ID  - Can send Hep viral panel for screening    - Cholelithiasis and distended GB on prior US 9/28, was no evidence of acute cholecystitis, was seen by GI. Later developed mostly hepatocellular elevation with normal cholestatic parameters and while ALP slightly up-trended  since 10/10, now improving and bilirubin remained normal, and no abdominal tenderness.   - Consider repeat RUQ US if cholestatic parameters worsen.    - On AXR 10/6 concern joey for pneumoperitoneum -patient was too unstable for follow up imaging. 10/16 AXR b/o concern for ileus mentions only non specific bowel gas pattern.    Rest of mgmt. per primary team    Thank you for the consult  Will continue to follow.    D/w primary team.

## 2021-10-18 NOTE — PROGRESS NOTE ADULT - PROBLEM SELECTOR PLAN 3
ON CONTACT AND DROPLET ISOLATION PRECAUTION; F/U COVID PCR f/u COVID 10/21  negative until 10/21 can discontinue isolation on 10/24, spoke with Infection Control

## 2021-10-18 NOTE — PROGRESS NOTE ADULT - ASSESSMENT
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 Of note, the patient never followed up with podiatry after his procedure in August. MRI confirms suspected osteomyelitis - patient followed by podiatry and ID, patient underwent debridement and wound VAC placement and removed in OR, was treated with Unasyn and Levofloxacin, ID. Surgical pathology resulted OM, wound culture resulting Enterococcus Aeromonal and Klebsiella- treated initially with Zosyn however hospital course complicated with OREN likely mixed etiology with prerenal from active infection and pulmonary edema, intrarenal due to toxicity from IV antibiotics and post renal from urinary retention, Nephrology consulted for optimization of kidney function. IV antibiotic regimen switched to Meropenem, sensitive for organism, will need 6 weeks of IV antibiotics via PICC, until 11/3. IR guided PICC placed to Left arm.  Patient subsequently treated for Pulmonary Edema with IV lasix, Cardiology recommending SABIHA with L/R heart cath once infection clears and medically stable. Hospital course further complicated by abdominal distention and constipation for which he received lactulose and vomited. The patient had subsequent respiratory distress and it is suspected he aspirated as CXR showed possible RLL PNA. AXR shows distended loops of bowel for which NG tube was placed which the patient removed overnight 9/26 - re-attempts to replace NG tube were unsuccessful as patient was non-cooperative. Surgery consulted. CT abdomen deferred for now as patient is unstable and will not tolerate lying flat. ICU consulted given patient's deterioration in clinical status.  Patient was transferred to ICU, mechanically intubated and extubated for AHRF secondary to acute on CHF. Right pigtail placed for pleural effusion, and removed when resolved by Thoracic surgery. Patient stable and transferred to medicine floor. Patient however exposed to COVID, and beng monitored for conversion. PT recommending STEVAN.

## 2021-10-18 NOTE — PROGRESS NOTE ADULT - ASSESSMENT
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  As per daughter, patient was taking tylenol which did not help his pain prompting the patient to ask his daughter to take him to the hospital. ON admission, he has no fever or Leukocytosis. The Xray of Right foot shows no Osteomyelitis but MRI of Right foot shows Lateral soft tissue wound with marrow signal abnormality throughout the fifth metatarsal which is consistent of Osteomyelitis. He has seen by Podiatry and wound culture has sent, Zosyn and Vancomycin has started. The ID consult requested to assist with further evaluation and antibiotic management.     # Right Fifth metatarsal DFU with drainage and Osteomyelitis- wound cx grew Enterococcus, Aeromonas and Klebsiella - Zosyn is the ideal to cover All organisms but kidney function is worsening, hence change to Unasyn and Levaquin until kidney function is improved - The pathology shows Bone with acute osteomyelitis and necrotic periosteal tissue.   # OREN- s/p urinary retention -s/p ibrahim catheter - s/p discontinue ACEI  # RLL pneumonia- most likely Aspiration, S/p Vomiting - On Unasyn  # Large bowel distension  # Candiduria- 9/22/21  # Pulmonary edema with bilateral moderate to large pleural effusions- on CT chest, 10/5/21- s/p intubation 10/7- s/p Right sided chest tube placement by CT team, 10/8/21  # COVID Exposure - PCR negative as of  10/11/21    would recommend:    1. Monitor kidney function   2. Continue Meropenem to 1 g q 8hours based on crcl  3. Wound care as per Podiatry  4. Aspiration precaution  5. Need IV Abx until 11/3/21    d/w Nursing  staff    Attending Attestation:    Spent more than 35 minutes on total encounter, more than 50 % of the visit was spent counseling and/or coordinating care by the Attending physician.   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  As per daughter, patient was taking tylenol which did not help his pain prompting the patient to ask his daughter to take him to the hospital. ON admission, he has no fever or Leukocytosis. The Xray of Right foot shows no Osteomyelitis but MRI of Right foot shows Lateral soft tissue wound with marrow signal abnormality throughout the fifth metatarsal which is consistent of Osteomyelitis. He has seen by Podiatry and wound culture has sent, Zosyn and Vancomycin has started. The ID consult requested to assist with further evaluation and antibiotic management.     # Right Fifth metatarsal DFU with drainage and Osteomyelitis- wound cx grew Enterococcus, Aeromonas and Klebsiella - Zosyn is the ideal to cover All organisms but kidney function is worsening, hence change to Unasyn and Levaquin until kidney function is improved - The pathology shows Bone with acute osteomyelitis and necrotic periosteal tissue.   # OREN- s/p urinary retention -s/p ibrahim catheter - s/p discontinue ACEI  # RLL pneumonia- most likely Aspiration, S/p Vomiting - On Unasyn  # Large bowel distension  # Candiduria- 9/22/21  # Pulmonary edema with bilateral moderate to large pleural effusions- on CT chest, 10/5/21- s/p intubation 10/7- s/p Right sided chest tube placement by CT team, 10/8/21  # COVID Exposure - PCR negative as of  10/11/21    would recommend:    1. MAy change Meropenem to Zosyn  since kidney function is normal. now   2. Monitor  kidney function closely while on Zosyn   3. Wound care as per Podiatry  4. Aspiration precaution  5. Need IV Abx until 11/3/21    Attending Attestation:    Spent more than 35 minutes on total encounter, more than 50 % of the visit was spent counseling and/or coordinating care by the Attending physician.

## 2021-10-18 NOTE — PROGRESS NOTE ADULT - ASSESSMENT
A:   s/p R 5th ray resection - 8/19  s/p R 5th wound debridement, graft application, bone biopsy and wound vac application 9/22       P:  Pt seen and evaluated   Right foot wound evaluated  Applied adaptic and santyl to the right dorsal wound  Applied adaptic and santyl to the lateral aspect of the right foot   Dressed right foot with DSD   Continue abx per ID recommendation  Continue offloading lower extremities in CAIR boots   Pod plan: continue with [facility]   Will follow while in house  Discussed with attending Dr. Vazquez

## 2021-10-18 NOTE — PROGRESS NOTE ADULT - PROBLEM SELECTOR PLAN 1
S/P RIGHT 5TH RAY RESECTION [8/19] AND S/P DEBRIDEMENT, GRAFT APPLICATION, BONE BX AND WOUND VAC APPLICATION 9/22  ** RECENT ADMISSION FOR RIGHT DIABETIC FOOT ULCER, CELLULITIS, OSTEOMYELITIS RIGHT 5th METATARSAL S/P RIGHT 5TH PARTIAL RAY AMPUTATION [8/20] - BONE PATHOLOGY W/ CLEAN MARGINS  # SUSPECT RIGHT FOOT OSTEOMYELITIS   - S/P VANCOMYCIN AND ZOSYN ; NOW ON UNASYN AND LEVAQUIN GIVEN OREN; PER ID SWITCH TO ZOSYN UNTIL 11/3  - RECENTLY HAD SIMON W/ MILD PAD  - REVIEWED WOUND CX [ ENTEROCOCCUS, AEROMONAS, KLEBSIELLA] AND BCX  - BONE BX - acute osteomyelitis and necrotic periosteal tissue.       - PICC LINE PLACED TO COMPLETE 6 WEEKS OF ANTIBIOTICS - PER ID SWITCH TO ZOSYN UNTIL 11/3 surgical pathology OM  wound culture resulting Enterococcus Aeromonal and Klebsiella  c/w Meropenem 11/3  podiatry dressing change  ID Dr. Barrett

## 2021-10-18 NOTE — PROGRESS NOTE ADULT - SUBJECTIVE AND OBJECTIVE BOX
Patient is seen and examined at the bed side, is afebrile. He mentioned feeling better.       REVIEW OF SYSTEMS: All other review systems are negative      ALLERGIES: No Known Allergies      Vital Signs Last 24 Hrs  T(C): 36.5 (18 Oct 2021 13:10), Max: 36.8 (17 Oct 2021 20:11)  T(F): 97.7 (18 Oct 2021 13:10), Max: 98.3 (17 Oct 2021 20:11)  HR: 81 (18 Oct 2021 17:37) (69 - 90)  BP: 147/72 (18 Oct 2021 17:37) (131/69 - 153/66)  BP(mean): --  RR: 20 (18 Oct 2021 13:10) (18 - 20)  SpO2: 100% (18 Oct 2021 13:10) (97% - 100%)      PHYSICAL EXAM:  GENERAL: Not in distress  CHEST/LUNG:  Not using accessory muscles, s/p removal of Right CT   HEART: s1 and s2 present  ABDOMEN:  Mild distended   EXTREMITIES: Right foot bandage in placed   CNS: Awake and alert       LABS:                        9.1    4.51  )-----------( 238      ( 18 Oct 2021 07:50 )             27.6                           9.5    4.02  )-----------( 211      ( 17 Oct 2021 06:20 )             29.7                10-18    139  |  107  |  17  ----------------------------<  128<H>  3.9   |  23  |  0.97    Ca    8.9      18 Oct 2021 07:50  Mg     2.1     10-18    TPro  6.7  /  Alb  2.3<L>  /  TBili  0.8  /  DBili  x   /  AST  39  /  ALT  81<H>  /  AlkPhos  134<H>  10-17      10-17    138  |  106  |  17  ----------------------------<  90  4.1   |  26  |  1.05    Ca    8.8      17 Oct 2021 06:20  Phos  3.2     10-16  Mg     2.1     10-16    TPro  6.7  /  Alb  2.3<L>  /  TBili  0.8  /  DBili  x   /  AST  39  /  ALT  81<H>  /  AlkPhos  134<H>  10-17      10-13    145  |  111<H>  |  19<H>  ----------------------------<  199<H>  4.2   |  28  |  1.31<H>    Ca    8.6      13 Oct 2021 04:58  Phos  2.2     10-13  Mg     2.1     10-13    TPro  6.5  /  Alb  2.1<L>  /  TBili  0.5  /  DBili  x   /  AST  153<H>  /  ALT  227<H>  /  AlkPhos  180<H>  10-13      09-25    139  |  107  |  41<H>  ----------------------------<  134<H>  4.1   |  21<L>  |  4.05<H>    Ca    8.6      25 Sep 2021 06:40    TPro  6.6  /  Alb  2.3<L>  /  TBili  0.5  /  DBili  x   /  AST  25  /  ALT  15  /  AlkPhos  102  09-25        CAPILLARY BLOOD GLUCOSE  POCT Blood Glucose.: 134 mg/dL (17 Sep 2021 17:11)  POCT Blood Glucose.: 128 mg/dL (17 Sep 2021 11:06)  POCT Blood Glucose.: 157 mg/dL (17 Sep 2021 04:10)      Vancomycin Level, Trough (10.14.21 @ 11:32) : 21.8  Vancomycin Level, Trough (10.12.21 @ 12:13)   Vancomycin Level, Trough: 19.5:      MEDICATIONS  (STANDING):    apixaban 5 milliGRAM(s) Oral two times a day  aspirin  chewable 81 milliGRAM(s) Oral daily  atorvastatin 80 milliGRAM(s) Oral at bedtime  chlorhexidine 2% Cloths 1 Application(s) Topical <User Schedule>  collagenase Ointment 1 Application(s) Topical daily  cyanocobalamin 1000 MICROGram(s) Oral daily  ergocalciferol 61263 Unit(s) Oral <User Schedule>  ferrous    sulfate Liquid 300 milliGRAM(s) Enteral Tube daily  finasteride 5 milliGRAM(s) Oral daily  gabapentin 100 milliGRAM(s) Oral three times a day  insulin lispro (ADMELOG) corrective regimen sliding scale   SubCutaneous every 6 hours  meropenem  IVPB 1000 milliGRAM(s) IV Intermittent every 12 hours  metoprolol tartrate 25 milliGRAM(s) Oral two times a day  NIFEdipine XL 60 milliGRAM(s) Oral daily  pantoprazole  Injectable 40 milliGRAM(s) IV Push at bedtime        RADIOLOGY & ADDITIONAL TESTS:    10/5/21 CT Chest No Cont (10.05.21 @ 14:07) Pulmonary edema with bilateral moderate to large pleural effusions. Underlying bilateral lower lobe compressive atelectasis versus pneumonia.  Mildly enlarged mediastinal lymph nodes, possibly reactive.      10/2/21: Xray Chest 1 View- PORTABLE-Routine (Xray Chest 1 View- PORTABLE-Routine in AM.) (10.02.21 @ 09:46) There is persistent bilateral perihilar/basilar diffuse airspace disease and/or RIGHT effusion.  Cardiomegaly.  No interval change.    Collected Date/Time: 9/22/2021 08:42 EDT   Received Date/Time: 9/23/2021 09:29 EDT   Surgical Pathology Report - Auth (Verified)   Specimen(s) Submitted   1 Right 5th Toe Wound Debridement r/o Osteomyelitis   Final Diagnosis   Right fifth toe; wound debridement:   - Bone with acute osteomyelitis and necrotic periosteal tissue.     9/28/21: US Abdomen Upper Quadrant Right (09.28.21 @ 12:13) Cholelithiasis without evidence of acute cholecystitis. Note is made of right pleural effusion    9/27/21: CT Abdomen and Pelvis w/ Oral Cont (09.27.21 @ 15:53) >No acute intra-abdominal pathology.    Small bilateral pleural effusions with compressive atelectasis of both lower lobes.    9/26/21: Xray Chest 1 View-PORTABLE IMMEDIATE (Xray Chest 1 View-PORTABLE IMMEDIATE .) (09.26.21 @ 15:28) : Small bilateral pleural effusions and mild pulmonary edema. A right lower lobe pneumonia is not excluded.      9/26/21: Xray Abdomen 1 View Portable, IMMEDIATE (Xray Abdomen 1 View Portable, IMMEDIATE .) (09.26.21 @ 15:28) : There is a nasogastric tube with its tip in the stomach. There is gaseous distention of the colon. No pathologic calcifications are seen. The osseous structures are intact with degenerative change is present in the spine.      9/17/21 : MR Foot No Cont, Right (09.17.21 @ 13:04) : Resection at the mid aspect of the fifth metatarsal. Lateral soft tissue wound with marrow signal abnormality throughout the fifth metatarsal and within the adjacent fourth metatarsal head which is nonspecific in the setting of recent surgery although osteomyelitis is suspected.    9/16/21 : Xray Foot AP + Lateral + Oblique, Right (09.16.21 @ 15:03) : No acute finding. No plain film evidence of osteomyelitis.        MICROBIOLOGY DATA:    COVID-19 PCR (10.11.21 @ 05:44)   COVID-19 PCR: NotDetec:   Urine Microscopic-Add On (NC) (09.22.21 @ 16:14)   Red Blood Cell - Urine: 0-2 /HPF   White Blood Cell - Urine: 3-5 /HPF   Bacteria: Moderate /HPF   Comment - Urine: yeast cells present   Epithelial Cells: Moderate /HPF     Culture - Tissue with Gram Stain (09.22.21 @ 13:54)   Gram Stain: No polymorphonuclear cells seen per low power field   No organisms seen per oil power field   Specimen Source: .Tissue Right 5th toe r/o osteomyeltis   Culture Results: No growth     COVID-19 David Domain Antibody (09.18.21 @ 12:55)   COVID-19 David Domain Antibody Result: >250.00:    Culture - Blood (09.17.21 @ 10:28)   Specimen Source: .Blood Blood-Peripheral   Culture Results: No growth to date.     Culture - Blood (09.17.21 @ 10:28)   Specimen Source: .Blood Blood-Venous   Culture Results: No growth to date.     Culture - Surgical Swab (09.16.21 @ 21:42)   - Amikacin: S <=16   - Amikacin: S <=16   - Amoxicillin/Clavulanic Acid: S <=8/4   - Ampicillin: R >16 These ampicillin results predict results for amoxicillin   - Ampicillin: S <=2 Predicts results to ampicillin/sulbactam, amoxacillin-clavulanate and piperacillin-tazobactam.   - Ampicillin/Sulbactam: S 8/4 Enterobacter, Citrobacter, and Serratia may develop resistance during prolonged therapy (3-4 days)   - Ampicillin/Sulbactam: R >16/8   - Aztreonam: S <=4   - Aztreonam: S <=4   - Cefazolin: R 16 Enterobacter, Citrobacter, and Serratia may develop resistance during prolonged therapy (3-4 days)   - Cefazolin: R >16   - Cefepime: S <=2   - Cefepime: S <=2   - Cefoxitin: S <=8   - Cefoxitin: S <=8   - Ceftazidime: S <=1   - Ceftriaxone: S <=1   - Ceftriaxone: S <=1 Enterobacter, Citrobacter, and Serratia may develop resistance during prolonged therapy   - Ciprofloxacin: S <=0.25   - Ciprofloxacin: S <=0.25   - Ertapenem: S <=0.5   - Gentamicin: S <=2   - Gentamicin: S <=2   - Imipenem: S <=1   - Levofloxacin: S <=0.5   - Levofloxacin: S <=0.5   - Meropenem: S <=1   - Meropenem: S <=1   - Piperacillin/Tazobactam: S <=8   - Piperacillin/Tazobactam: S <=8   - Tetra/Doxy: S 4   - Tobramycin: S <=2   - Trimethoprim/Sulfamethoxazole: S <=0.5/9.5   - Trimethoprim/Sulfamethoxazole: S <=0.5/9.5   - Vancomycin: S 2   Specimen Source: .Surgical Swab right foot wound   Culture Results:   Culture yields >4 types of aerobic and/or anaerobic bacteria   Call client services within 7 days if further workup is clinically   indicated. Culture includes   Rare Aeromonas hydrophila/caviae   Few Klebsiella oxytoca/Raoutella ornithinolytica   Few Enterococcus faecalis   Few Streptococcus agalactiae (Group B) isolated   Group B streptococci are susceptible to ampicillin,   penicillin and cefazolin, but may be resistant to   erythromycin and clindamycin.   Recommendations for intrapartum prophylaxis for Group B   streptococci are penicillin or ampicillin.   Organism Identification: Aeromonas hydrophila/caviae   Klebsiella oxytoca /Raoutella ornithinolytica   Enterococcus faecalis   Organism: Aeromonas hydrophila/caviae   Organism: Klebsiella oxytoca /Raoutella ornithinolytica   Organism: Enterococcus faecalis   COVID-19 PCR . (09.16.21 @ 22:24)   COVID-19 PCR: NotDetec:             Patient is seen and examined at the bed side, is afebrile. The creatinine  trended back down to normal.       REVIEW OF SYSTEMS: All other review systems are negative      ALLERGIES: No Known Allergies      Vital Signs Last 24 Hrs  T(C): 36.5 (18 Oct 2021 13:10), Max: 36.8 (17 Oct 2021 20:11)  T(F): 97.7 (18 Oct 2021 13:10), Max: 98.3 (17 Oct 2021 20:11)  HR: 81 (18 Oct 2021 17:37) (69 - 90)  BP: 147/72 (18 Oct 2021 17:37) (131/69 - 153/66)  BP(mean): --  RR: 20 (18 Oct 2021 13:10) (18 - 20)  SpO2: 100% (18 Oct 2021 13:10) (97% - 100%)      PHYSICAL EXAM:  GENERAL: Not in distress  CHEST/LUNG:  Not using accessory muscles, s/p removal of Right CT   HEART: s1 and s2 present  ABDOMEN:  Mild distended   EXTREMITIES: Right foot bandage in placed   CNS: Awake and alert       LABS:                        9.1    4.51  )-----------( 238      ( 18 Oct 2021 07:50 )             27.6                           9.5    4.02  )-----------( 211      ( 17 Oct 2021 06:20 )             29.7                10-18    139  |  107  |  17  ----------------------------<  128<H>  3.9   |  23  |  0.97    Ca    8.9      18 Oct 2021 07:50  Mg     2.1     10-18    TPro  6.7  /  Alb  2.3<L>  /  TBili  0.8  /  DBili  x   /  AST  39  /  ALT  81<H>  /  AlkPhos  134<H>  10-17      10-17    138  |  106  |  17  ----------------------------<  90  4.1   |  26  |  1.05    Ca    8.8      17 Oct 2021 06:20  Phos  3.2     10-16  Mg     2.1     10-16    TPro  6.7  /  Alb  2.3<L>  /  TBili  0.8  /  DBili  x   /  AST  39  /  ALT  81<H>  /  AlkPhos  134<H>  10-17      09-25    139  |  107  |  41<H>  ----------------------------<  134<H>  4.1   |  21<L>  |  4.05<H>    Ca    8.6      25 Sep 2021 06:40    TPro  6.6  /  Alb  2.3<L>  /  TBili  0.5  /  DBili  x   /  AST  25  /  ALT  15  /  AlkPhos  102  09-25        CAPILLARY BLOOD GLUCOSE  POCT Blood Glucose.: 134 mg/dL (17 Sep 2021 17:11)  POCT Blood Glucose.: 128 mg/dL (17 Sep 2021 11:06)  POCT Blood Glucose.: 157 mg/dL (17 Sep 2021 04:10)      Vancomycin Level, Trough (10.14.21 @ 11:32) : 21.8  Vancomycin Level, Trough (10.12.21 @ 12:13)   Vancomycin Level, Trough: 19.5:      MEDICATIONS  (STANDING):    apixaban 5 milliGRAM(s) Oral two times a day  aspirin  chewable 81 milliGRAM(s) Oral daily  atorvastatin 80 milliGRAM(s) Oral at bedtime  chlorhexidine 2% Cloths 1 Application(s) Topical <User Schedule>  collagenase Ointment 1 Application(s) Topical daily  cyanocobalamin 1000 MICROGram(s) Oral daily  ergocalciferol 75138 Unit(s) Oral <User Schedule>  ferrous    sulfate Liquid 300 milliGRAM(s) Enteral Tube daily  finasteride 5 milliGRAM(s) Oral daily  gabapentin 100 milliGRAM(s) Oral three times a day  insulin lispro (ADMELOG) corrective regimen sliding scale   SubCutaneous every 6 hours  meropenem  IVPB 1000 milliGRAM(s) IV Intermittent every 12 hours  metoprolol tartrate 25 milliGRAM(s) Oral two times a day  NIFEdipine XL 60 milliGRAM(s) Oral daily  pantoprazole  Injectable 40 milliGRAM(s) IV Push at bedtime        RADIOLOGY & ADDITIONAL TESTS:    10/5/21 CT Chest No Cont (10.05.21 @ 14:07) Pulmonary edema with bilateral moderate to large pleural effusions. Underlying bilateral lower lobe compressive atelectasis versus pneumonia.  Mildly enlarged mediastinal lymph nodes, possibly reactive.      10/2/21: Xray Chest 1 View- PORTABLE-Routine (Xray Chest 1 View- PORTABLE-Routine in AM.) (10.02.21 @ 09:46) There is persistent bilateral perihilar/basilar diffuse airspace disease and/or RIGHT effusion.  Cardiomegaly.  No interval change.    Collected Date/Time: 9/22/2021 08:42 EDT   Received Date/Time: 9/23/2021 09:29 EDT   Surgical Pathology Report - Auth (Verified)   Specimen(s) Submitted   1 Right 5th Toe Wound Debridement r/o Osteomyelitis   Final Diagnosis   Right fifth toe; wound debridement:   - Bone with acute osteomyelitis and necrotic periosteal tissue.     9/28/21: US Abdomen Upper Quadrant Right (09.28.21 @ 12:13) Cholelithiasis without evidence of acute cholecystitis. Note is made of right pleural effusion    9/27/21: CT Abdomen and Pelvis w/ Oral Cont (09.27.21 @ 15:53) >No acute intra-abdominal pathology.    Small bilateral pleural effusions with compressive atelectasis of both lower lobes.    9/26/21: Xray Chest 1 View-PORTABLE IMMEDIATE (Xray Chest 1 View-PORTABLE IMMEDIATE .) (09.26.21 @ 15:28) : Small bilateral pleural effusions and mild pulmonary edema. A right lower lobe pneumonia is not excluded.      9/26/21: Xray Abdomen 1 View Portable, IMMEDIATE (Xray Abdomen 1 View Portable, IMMEDIATE .) (09.26.21 @ 15:28) : There is a nasogastric tube with its tip in the stomach. There is gaseous distention of the colon. No pathologic calcifications are seen. The osseous structures are intact with degenerative change is present in the spine.      9/17/21 : MR Foot No Cont, Right (09.17.21 @ 13:04) : Resection at the mid aspect of the fifth metatarsal. Lateral soft tissue wound with marrow signal abnormality throughout the fifth metatarsal and within the adjacent fourth metatarsal head which is nonspecific in the setting of recent surgery although osteomyelitis is suspected.    9/16/21 : Xray Foot AP + Lateral + Oblique, Right (09.16.21 @ 15:03) : No acute finding. No plain film evidence of osteomyelitis.        MICROBIOLOGY DATA:    COVID-19 PCR (10.11.21 @ 05:44)   COVID-19 PCR: NotDetec:   Urine Microscopic-Add On (NC) (09.22.21 @ 16:14)   Red Blood Cell - Urine: 0-2 /HPF   White Blood Cell - Urine: 3-5 /HPF   Bacteria: Moderate /HPF   Comment - Urine: yeast cells present   Epithelial Cells: Moderate /HPF     Culture - Tissue with Gram Stain (09.22.21 @ 13:54)   Gram Stain: No polymorphonuclear cells seen per low power field   No organisms seen per oil power field   Specimen Source: .Tissue Right 5th toe r/o osteomyeltis   Culture Results: No growth     COVID-19 David Domain Antibody (09.18.21 @ 12:55)   COVID-19 David Domain Antibody Result: >250.00:    Culture - Blood (09.17.21 @ 10:28)   Specimen Source: .Blood Blood-Peripheral   Culture Results: No growth to date.     Culture - Blood (09.17.21 @ 10:28)   Specimen Source: .Blood Blood-Venous   Culture Results: No growth to date.     Culture - Surgical Swab (09.16.21 @ 21:42)   - Amikacin: S <=16   - Amikacin: S <=16   - Amoxicillin/Clavulanic Acid: S <=8/4   - Ampicillin: R >16 These ampicillin results predict results for amoxicillin   - Ampicillin: S <=2 Predicts results to ampicillin/sulbactam, amoxacillin-clavulanate and piperacillin-tazobactam.   - Ampicillin/Sulbactam: S 8/4 Enterobacter, Citrobacter, and Serratia may develop resistance during prolonged therapy (3-4 days)   - Ampicillin/Sulbactam: R >16/8   - Aztreonam: S <=4   - Aztreonam: S <=4   - Cefazolin: R 16 Enterobacter, Citrobacter, and Serratia may develop resistance during prolonged therapy (3-4 days)   - Cefazolin: R >16   - Cefepime: S <=2   - Cefepime: S <=2   - Cefoxitin: S <=8   - Cefoxitin: S <=8   - Ceftazidime: S <=1   - Ceftriaxone: S <=1   - Ceftriaxone: S <=1 Enterobacter, Citrobacter, and Serratia may develop resistance during prolonged therapy   - Ciprofloxacin: S <=0.25   - Ciprofloxacin: S <=0.25   - Ertapenem: S <=0.5   - Gentamicin: S <=2   - Gentamicin: S <=2   - Imipenem: S <=1   - Levofloxacin: S <=0.5   - Levofloxacin: S <=0.5   - Meropenem: S <=1   - Meropenem: S <=1   - Piperacillin/Tazobactam: S <=8   - Piperacillin/Tazobactam: S <=8   - Tetra/Doxy: S 4   - Tobramycin: S <=2   - Trimethoprim/Sulfamethoxazole: S <=0.5/9.5   - Trimethoprim/Sulfamethoxazole: S <=0.5/9.5   - Vancomycin: S 2   Specimen Source: .Surgical Swab right foot wound   Culture Results:   Culture yields >4 types of aerobic and/or anaerobic bacteria   Call client services within 7 days if further workup is clinically   indicated. Culture includes   Rare Aeromonas hydrophila/caviae   Few Klebsiella oxytoca/Raoutella ornithinolytica   Few Enterococcus faecalis   Few Streptococcus agalactiae (Group B) isolated   Group B streptococci are susceptible to ampicillin,   penicillin and cefazolin, but may be resistant to   erythromycin and clindamycin.   Recommendations for intrapartum prophylaxis for Group B   streptococci are penicillin or ampicillin.   Organism Identification: Aeromonas hydrophila/caviae   Klebsiella oxytoca /Raoutella ornithinolytica   Enterococcus faecalis   Organism: Aeromonas hydrophila/caviae   Organism: Klebsiella oxytoca /Raoutella ornithinolytica   Organism: Enterococcus faecalis   COVID-19 PCR . (09.16.21 @ 22:24)   COVID-19 PCR: NotDetec:

## 2021-10-18 NOTE — PROGRESS NOTE ADULT - SUBJECTIVE AND OBJECTIVE BOX
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 s/p OR debridement today. Nephrology consulted for abrupt rise in SCr.   s/p ibrahim placement for distended bladder on 9/23 with ~400ml of urine o/p  9/27- pt with SOB; CXR with pulmonary edema- pt given Lasix 80mg IV x1. Concern for aspiration PNA  Course BP: s/p lasix 60mg IV on 10/1 & 10/2 and s/p Lasix 40mg IV x1 today 10/4. Bladder scan with 1L urine; s/p ibrahim reinserted  Transferred to ICU for acute hypoxic respiratory failure, s/p intubated on 10/7, s/p extubated 10/13  s/p Rt pig tail removal today    Hospital Medications: Medications reviewed.  REVIEW OF SYSTEMS: Pt denies any SOB, chest pain, n/v/d, abd pain or LE edema +b/l foot pain      VITALS:  T(F): 97.6 (10-18-21 @ 05:08), Max: 98.7 (10-17-21 @ 13:30)  HR: 90 (10-18-21 @ 05:08)  BP: 148/71 (10-18-21 @ 05:08)  RR: 18 (10-18-21 @ 05:08)  SpO2: 97% (10-18-21 @ 05:08)  Wt(kg): --    10-17 @ 07:01  -  10-18 @ 07:00  --------------------------------------------------------  IN: 0 mL / OUT: 2000 mL / NET: -2000 mL        PHYSICAL EXAM:  Gen: NAD  Cards: RRR, +S1/S2, +TRINIDAD  Resp: mild rales at bases  GI: soft,   : +ibrahim  Extremities: no LE edema B/L  Derm: Rt foot wrapped     LABS:  10-18    139  |  107  |  17  ----------------------------<  128<H>  3.9   |  23  |  0.97    Ca    8.9      18 Oct 2021 07:50  Mg     2.1     10-18    TPro  6.7  /  Alb  2.3<L>  /  TBili  0.8  /  DBili      /  AST  39  /  ALT  81<H>  /  AlkPhos  134<H>  10-17    Creatinine Trend: 0.97 <--, 1.05 <--, 0.97 <--, 0.99 <--, 0.99 <--, 1.31 <--, 1.43 <--                        9.1    4.51  )-----------( 238      ( 18 Oct 2021 07:50 )             27.6     Urine Studies:

## 2021-10-18 NOTE — PROGRESS NOTE ADULT - PROBLEM SELECTOR PLAN 11
COVID exposure needs to test negative on 10/21, and can come off isolation  PT recommending STEVAN- [facility]

## 2021-10-18 NOTE — PROGRESS NOTE ADULT - PROBLEM SELECTOR PLAN 2
S/T PULMONARY EDEMA IN THE SETTING OF SEVERE AORTIC STENOSIS + ASPIRATION PNA; COMBINED SYSTOLIC + DIASTOLIC HF - S/P CT TUBE PLACEMENT [SET TO PLEUROVAC] - SWITCHED FROM LEVAQUIN TO VANCOMYCIN + MEROPENEM, LASIX, CARDIOLOGY CONSULT IN PROGRESS  - CRITICAL CARE CONSULT  - ASPIRATION EVENT WHEN PATIENT REMOVED NGT   - S/P LASIX ON 10/1 - 10/2, 10/4 AND 10/5  - CT CHEST W/ BILATERAL PLEURAL EFFUSIONS [10/5] - PULMONOLOGY CONSULT FOR POSSIBLE THORACENTESIS & WILL CONTINUE LASIX   - ALSO F/U REPEAT ECHOCARDIOGRAM  - CT SURGERY WAS CONSULTED BUT UNABLE TO PLACE PIGTAIL, THUS IR CONSULT IN PROGRESS FOR PIGTAIL PLACEMENT  - CHEST TUBE PLACED [10/8] - FLUID CONSISTENT WITH TRANSUDATIVE PROCESS    - NOTED REPEAT ECHO  - S/P EXTUBATION 10/13  -pigtail removed 10/15 CXR clear secondary to pleural effusion from CHF   fluid consistent with transudative  process  S/P EXTUBATION 10/13  pigtail removed 10/15 CXR clear

## 2021-10-18 NOTE — PROGRESS NOTE ADULT - SUBJECTIVE AND OBJECTIVE BOX
Podiatry Interval: Pt was seen resting in bed. Pt was verbal. Pt denies N/V/F/C/SOB. Pt admits to pain in the right foot. Pt had a wound vac on that was removed on 10/13.     Podiatry HPI: 76 year old male with a PMHx of DM, HTN, HLD, CAD to ED presents to the ED for a Right Foot s/p 5th foot partial ray resection and fillet toe flap. Patient states that he has sharp localized non-radiating pain to the Right foot 5th metatarsal. States that after his surgery, he did not see a podiatrist and he came into the ED because he saw drainage and was having pain. Denies any constitutional symptoms of N/V/C/F/SOB. Denies any other pedal complaints at this time. Denies calf pain today, bilaterally.      Medications apixaban 5 milliGRAM(s) Oral two times a day  aspirin  chewable 81 milliGRAM(s) Oral daily  atorvastatin 80 milliGRAM(s) Oral at bedtime  chlorhexidine 2% Cloths 1 Application(s) Topical <User Schedule>  collagenase Ointment 1 Application(s) Topical daily  cyanocobalamin 1000 MICROGram(s) Oral daily  dextrose 5% + sodium chloride 0.9%. 1000 milliLiter(s) IV Continuous <Continuous>  ergocalciferol 83637 Unit(s) Oral <User Schedule>  ferrous    sulfate Liquid 300 milliGRAM(s) Enteral Tube daily  finasteride 5 milliGRAM(s) Oral daily  gabapentin 100 milliGRAM(s) Oral three times a day  insulin lispro (ADMELOG) corrective regimen sliding scale   SubCutaneous every 6 hours  meropenem  IVPB 1000 milliGRAM(s) IV Intermittent every 12 hours  metoprolol tartrate 25 milliGRAM(s) Oral two times a day  NIFEdipine XL 60 milliGRAM(s) Oral daily  ondansetron Injectable 4 milliGRAM(s) IV Push three times a day PRN  pantoprazole  Injectable 40 milliGRAM(s) IV Push at bedtime  sodium chloride 0.9% lock flush 10 milliLiter(s) IV Push every 1 hour PRN    FH: Family history of acute myocardial infarction (Father)    Family history of diabetes mellitus (Mother, Sibling)    Family history of hypertension (Mother, Sibling)    Family history of hyperlipidemia (Mother, Sibling)    ,   PMH: HTN (hypertension)    HLD (hyperlipidemia)    DM (diabetes mellitus)    CAD (coronary artery disease)    Glaucoma, angle-closure    [place] (hard of hearing)       PSH: No significant past surgical history    S/P CABG (coronary artery bypass graft)    History of thyroid surgery      Labs                          9.1    4.51  )-----------( 238      ( 18 Oct 2021 07:50 )             27.6      10-18    139  |  107  |  17  ----------------------------<  128<H>  3.9   |  23  |  0.97    Ca    8.9      18 Oct 2021 07:50  Mg     2.1     10-18    TPro  6.7  /  Alb  2.3<L>  /  TBili  0.8  /  DBili  x   /  AST  39  /  ALT  81<H>  /  AlkPhos  134<H>  10-17     Vital Signs Last 24 Hrs  T(C): 36.4 (18 Oct 2021 05:08), Max: 37.1 (17 Oct 2021 13:30)  T(F): 97.6 (18 Oct 2021 05:08), Max: 98.7 (17 Oct 2021 13:30)  HR: 90 (18 Oct 2021 05:08) (69 - 90)  BP: 148/71 (18 Oct 2021 05:08) (122/66 - 153/66)  BP(mean): --  RR: 18 (18 Oct 2021 05:08) (18 - 18)  SpO2: 97% (18 Oct 2021 05:08) (97% - 97%)  Sedimentation Rate, Erythrocyte: 85 mm/Hr (10-08-21 @ 03:52)  Sedimentation Rate, Erythrocyte: 108 mm/Hr (10-02-21 @ 07:02)         C-Reactive Protein, Serum: 53 mg/L (10-08-21 @ 12:10)  C-Reactive Protein, Serum: 43 mg/L (10-02-21 @ 14:05)  C-Reactive Protein, Serum: 45 mg/L (09-16-21 @ 23:33)  C-Reactive Protein, Serum: 85 mg/L (08-22-21 @ 21:30)  C-Reactive Protein, Serum: 67 mg/L (08-15-21 @ 11:55)   WBC Count: 4.51 K/uL (10-18-21 @ 07:50)    CAPILLARY BLOOD GLUCOSE    POCT Blood Glucose.: 128 mg/dL (18 Oct 2021 07:38)  POCT Blood Glucose.: 113 mg/dL (18 Oct 2021 05:50)  POCT Blood Glucose.: 91 mg/dL (17 Oct 2021 21:14)  POCT Blood Glucose.: 126 mg/dL (17 Oct 2021 17:22)  POCT Blood Glucose.: 85 mg/dL (17 Oct 2021 12:20)      ROS: All others negative unless otherwise stated in the HPI      PHYSICAL EXAM  LE Focused:    Vasc:  DP/PT pulses were faintly palpable, bilateral. DP/PT pulses were monophasic on doppler, bilaterally. CFT<3 seconds to all digits.   Derm: Surgical site with graft in place to R 5th ray, incorporating well, granular wound bed through the graft with patches of necrotic islands noted, minimal drainage or discharge. minimal erythema and edema noted, eschar forming over the wound. Dorsal foot wound noted with tendon exposed, DTI noted to b/l heel  Neuro: Protective sensation grossly diminished, b/l  MSK: 5/5 muscle strength noted to the pedal compartments. 5th digit amputation R foot        Imaging:    3 views right foot. Prior 8/20/2021.    Status post resection of the fifth toe and distal fifth metatarsal unchanged in appearance. No focal bone lysis or unusual periosteal reaction to suggest osteomyelitis. No acute fracture or dislocation. The remainder study unchanged. No soft tissue gas.    IMPRESSION: No acute finding. No plain film evidence of osteomyelitis.      MRI R foot:     INTERPRETATION:  Clinical Information: Recent fifth metatarsal head resection now with fifth digit pain, swelling and foul discharge.    Comparison: Radiographs of the right foot from 9/16/2021 and MRI the right foot from 8/16/2021.    Technique:  MRI of the right midfoot and forefoot.  Intravenous Contrast: None.    Findings:    There is a resection at the mid aspect of the fifth metatarsal shaft. There is a lateral soft tissue wound beginning at the level of the amputation which extends distally. There is susceptibility artifact in the region of the amputation consistent with postoperative change. There is hyperintense T2 marrow signal throughout the remaining fifth metatarsal and within the adjacent fourth metatarsal head which is nonspecific and could be related to recent postoperative changes although osteomyelitis is suspected.    There is edema and mild atrophy within the plantar muscles of the foot. There is minimal spurring at the first metatarsophalangeal and hallux sesamoid articulations.    Impression:  Resection at the mid aspect of the fifth metatarsal. Lateral soft tissue wound with marrow signal abnormality throughout the fifth metatarsal and within the adjacent fourth metatarsal head which is nonspecific in the setting of recent surgery although osteomyelitis is suspected.        Culture Results:     Rare Aeromonas hydrophila/caviae   Few Klebsiella oxytoca/Raoutella ornithinolytica   Few Enterococcus faecalis   Few Streptococcus agalactiae (Group B) isolated   Group B streptococci are susceptible to ampicillin,   penicillin and cefazolin, but may be resistant to   erythromycin and clindamycin.   Recommendations for intrapartum prophylaxis for Group B   streptococci are penicillin or ampicillin. (09.16.21 @ 21:42)     Gram Stain:   No polymorphonuclear cells seen per low power field   No organisms seen per oil power field (09.22.21 @ 13:54)       Historical Values  Gram Stain:   No polymorphonuclear cells seen per low power field   No organisms seen per oil power field (09.22.21 @ 13:54)   Gram Stain:   No polymorphonuclear leukocytes seen per low power field   No organisms seen per oil power field (08.20.21 @ 03:59)

## 2021-10-18 NOTE — PROGRESS NOTE ADULT - SUBJECTIVE AND OBJECTIVE BOX
NP Note discussed with  Primary Attending    Patient is a 76y old  Male who presents with a chief complaint of R Foot Pain (18 Oct 2021 13:01)      INTERVAL HPI/OVERNIGHT EVENTS: Patient seen and examined at bedside, no new complaints    MEDICATIONS  (STANDING):  apixaban 5 milliGRAM(s) Oral two times a day  aspirin  chewable 81 milliGRAM(s) Oral daily  atorvastatin 80 milliGRAM(s) Oral at bedtime  chlorhexidine 2% Cloths 1 Application(s) Topical <User Schedule>  collagenase Ointment 1 Application(s) Topical daily  cyanocobalamin 1000 MICROGram(s) Oral daily  ergocalciferol 05183 Unit(s) Oral <User Schedule>  ferrous    sulfate Liquid 300 milliGRAM(s) Enteral Tube daily  finasteride 5 milliGRAM(s) Oral daily  gabapentin 100 milliGRAM(s) Oral three times a day  insulin lispro (ADMELOG) corrective regimen sliding scale   SubCutaneous every 6 hours  meropenem  IVPB 1000 milliGRAM(s) IV Intermittent every 12 hours  metoprolol tartrate 25 milliGRAM(s) Oral two times a day  NIFEdipine XL 60 milliGRAM(s) Oral daily  pantoprazole  Injectable 40 milliGRAM(s) IV Push at bedtime    MEDICATIONS  (PRN):  ondansetron Injectable 4 milliGRAM(s) IV Push three times a day PRN Nausea and/or Vomiting  sodium chloride 0.9% lock flush 10 milliLiter(s) IV Push every 1 hour PRN Pre/post blood products, medications, blood draw, and to maintain line patency      __________________________________________________  REVIEW OF SYSTEMS:    CONSTITUTIONAL: No fever,   EYES: no acute visual disturbances  NECK: No pain or stiffness  RESPIRATORY: No cough; No shortness of breath  CARDIOVASCULAR: No chest pain, no palpitations  GASTROINTESTINAL: No pain. No nausea or vomiting; No diarrhea   NEUROLOGICAL: No headache or numbness, no tremors  MUSCULOSKELETAL: lower back pain  GENITOURINARY: no dysuria, no frequency, no hesitancy  PSYCHIATRY: no depression , no anxiety  ALL OTHER  ROS negative        Vital Signs Last 24 Hrs  T(C): 36.5 (18 Oct 2021 13:10), Max: 36.8 (17 Oct 2021 20:11)  T(F): 97.7 (18 Oct 2021 13:10), Max: 98.3 (17 Oct 2021 20:11)  HR: 80 (18 Oct 2021 13:10) (69 - 90)  BP: 131/69 (18 Oct 2021 13:10) (131/69 - 153/66)  BP(mean): --  RR: 20 (18 Oct 2021 13:10) (18 - 20)  SpO2: 100% (18 Oct 2021 13:10) (97% - 100%)    ________________________________________________  PHYSICAL EXAM:  GENERAL: NAD  HEENT: Normocephalic;  conjunctivae and sclerae clear; moist mucous membranes;   NECK : supple  CHEST/LUNG: Clear to auscultation bilaterally with good air entry   HEART: S1 S2  regular; no murmurs, gallops or rubs  ABDOMEN: Soft, Nontender, Nondistended; Bowel sounds present  EXTREMITIES: right foot ace bandage, no cyanosis; no edema; no calf tenderness  SKIN: right foot ulceration, perianal wound  NERVOUS SYSTEM:  Awake and alert; Oriented  to person    _________________________________________________  LABS:                        9.1    4.51  )-----------( 238      ( 18 Oct 2021 07:50 )             27.6     10-18    139  |  107  |  17  ----------------------------<  128<H>  3.9   |  23  |  0.97    Ca    8.9      18 Oct 2021 07:50  Mg     2.1     10-18    TPro  6.7  /  Alb  2.3<L>  /  TBili  0.8  /  DBili  x   /  AST  39  /  ALT  81<H>  /  AlkPhos  134<H>  10-17        CAPILLARY BLOOD GLUCOSE      POCT Blood Glucose.: 139 mg/dL (18 Oct 2021 11:37)  POCT Blood Glucose.: 128 mg/dL (18 Oct 2021 07:38)  POCT Blood Glucose.: 113 mg/dL (18 Oct 2021 05:50)  POCT Blood Glucose.: 91 mg/dL (17 Oct 2021 21:14)  POCT Blood Glucose.: 126 mg/dL (17 Oct 2021 17:22)    RADIOLOGY & ADDITIONAL TESTS:  < from: Xray Abdomen 1 View PORTABLE -Urgent (Xray Abdomen 1 View PORTABLE -Urgent .) (10.16.21 @ 20:25) >    EXAM:  XR ABDOMEN PORTABLE URGENT 1V                            PROCEDURE DATE:  10/16/2021          INTERPRETATION:  Suspect ileus.    AP portable supine. Prior 10/6/2021.    IMPRESSION:  Nonspecific bowel gas pattern. No definite obstruction. No opaque urinary biliary calculi. Vascular calcification. Advanced degenerative change thoracolumbar spine.    < end of copied text >    Imaging  Reviewed:  YES    Consultant(s) Notes Reviewed:   YES      Plan of care was discussed with patient and /or primary care giver; all questions and concerns were addressed  NP Note discussed with  Primary Attending    Patient is a 76y old  Male who presents with a chief complaint of R Foot Pain (18 Oct 2021 13:01)      INTERVAL HPI/OVERNIGHT EVENTS: Patient seen and examined at bedside, no new complaints    MEDICATIONS  (STANDING):  apixaban 5 milliGRAM(s) Oral two times a day  aspirin  chewable 81 milliGRAM(s) Oral daily  atorvastatin 80 milliGRAM(s) Oral at bedtime  chlorhexidine 2% Cloths 1 Application(s) Topical <User Schedule>  collagenase Ointment 1 Application(s) Topical daily  cyanocobalamin 1000 MICROGram(s) Oral daily  ergocalciferol 26951 Unit(s) Oral <User Schedule>  ferrous    sulfate Liquid 300 milliGRAM(s) Enteral Tube daily  finasteride 5 milliGRAM(s) Oral daily  gabapentin 100 milliGRAM(s) Oral three times a day  insulin lispro (ADMELOG) corrective regimen sliding scale   SubCutaneous every 6 hours  meropenem  IVPB 1000 milliGRAM(s) IV Intermittent every 12 hours  metoprolol tartrate 25 milliGRAM(s) Oral two times a day  NIFEdipine XL 60 milliGRAM(s) Oral daily  pantoprazole  Injectable 40 milliGRAM(s) IV Push at bedtime    MEDICATIONS  (PRN):  ondansetron Injectable 4 milliGRAM(s) IV Push three times a day PRN Nausea and/or Vomiting  sodium chloride 0.9% lock flush 10 milliLiter(s) IV Push every 1 hour PRN Pre/post blood products, medications, blood draw, and to maintain line patency      __________________________________________________  REVIEW OF SYSTEMS:    CONSTITUTIONAL: No fever,   EYES: no acute visual disturbances  NECK: No pain or stiffness  RESPIRATORY: No cough; No shortness of breath  CARDIOVASCULAR: No chest pain, no palpitations  GASTROINTESTINAL: No pain. No nausea or vomiting; No diarrhea   NEUROLOGICAL: No headache or numbness, no tremors  MUSCULOSKELETAL: lower back pain  GENITOURINARY: no dysuria, no frequency, no hesitancy  PSYCHIATRY: no depression , no anxiety  ALL OTHER  ROS negative        Vital Signs Last 24 Hrs  T(C): 36.5 (18 Oct 2021 13:10), Max: 36.8 (17 Oct 2021 20:11)  T(F): 97.7 (18 Oct 2021 13:10), Max: 98.3 (17 Oct 2021 20:11)  HR: 80 (18 Oct 2021 13:10) (69 - 90)  BP: 131/69 (18 Oct 2021 13:10) (131/69 - 153/66)  BP(mean): --  RR: 20 (18 Oct 2021 13:10) (18 - 20)  SpO2: 100% (18 Oct 2021 13:10) (97% - 100%)    ________________________________________________  PHYSICAL EXAM:  GENERAL: NAD  HEENT: Normocephalic;  conjunctivae and sclerae clear; moist mucous membranes;   NECK : supple  CHEST/LUNG: Clear to auscultation bilaterally with good air entry   HEART: S1 S2  regular; no murmurs, gallops or rubs  ABDOMEN: Soft, Nontender, Nondistended; Bowel sounds present  EXTREMITIES: right foot ace bandage, no cyanosis; no edema; no calf tenderness  SKIN: right foot ulceration, perianal wound  NERVOUS SYSTEM:  Awake and alert; Oriented  to person    _________________________________________________  LABS:                        9.1    4.51  )-----------( 238      ( 18 Oct 2021 07:50 )             27.6     10-18    139  |  107  |  17  ----------------------------<  128<H>  3.9   |  23  |  0.97    Ca    8.9      18 Oct 2021 07:50  Mg     2.1     10-18    TPro  6.7  /  Alb  2.3<L>  /  TBili  0.8  /  DBili  x   /  AST  39  /  ALT  81<H>  /  AlkPhos  134<H>  10-17        CAPILLARY BLOOD GLUCOSE      POCT Blood Glucose.: 139 mg/dL (18 Oct 2021 11:37)  POCT Blood Glucose.: 128 mg/dL (18 Oct 2021 07:38)  POCT Blood Glucose.: 113 mg/dL (18 Oct 2021 05:50)  POCT Blood Glucose.: 91 mg/dL (17 Oct 2021 21:14)  POCT Blood Glucose.: 126 mg/dL (17 Oct 2021 17:22)    RADIOLOGY & ADDITIONAL TESTS:  < from: Xray Abdomen 1 View PORTABLE -Urgent (Xray Abdomen 1 View PORTABLE -Urgent .) (10.16.21 @ 20:25) >    EXAM:  XR ABDOMEN PORTABLE URGENT 1V                            PROCEDURE DATE:  10/16/2021          INTERPRETATION:  Suspect ileus.    AP portable supine. Prior 10/6/2021.    IMPRESSION:  Nonspecific bowel gas pattern. No definite obstruction. No opaque urinary biliary calculi. Vascular calcification. Advanced degenerative change thoracolumbar spine.    < end of copied text >  < from: Transthoracic Echocardiogram (10.07.21 @ 15:26) >    Patient name: SHER ROBERT  YOB: 1945   Age: 76 (M)   MR#: [id number]  Study Date: 10/7/2021  [address] [facility] [place] [facility]  Study quality: Fair  Referring Physician:  KONG ALBRIGHT MD  Blood Pressure: 108/49 mmHg  Height: 170 cm  Weight: 86 kg  BSA: 2 m2  ------------------------------------------------------------------------    PROCEDURE: Transthoracic echocardiogram with 2-D, M-Mode  and complete spectral and color flow Doppler.  INDICATION: Shock,unspecified (R57.9)  HISTORY:  ------------------------------------------------------------------------  DIMENSIONS:  Dimensions:     Normal Values:  LA:     4.6 cm    2.0 - 4.0 cm  Ao:     3.8 cm    2.0 - 3.8 cm  SEPTUM: 1.2 cm    0.6 - 1.2 cm  PWT:   1.2 cm    0.6 - 1.1 cm  LVIDd:  5.5 cm    3.0 - 5.6 cm  LVIDs:  4.3 cm    1.8 - 4.0 cm      Derived Variables:  LVMI: 138 g/m2  RWT: 0.43  Ejection Fraction Visual Estimate: 40-45 %  Peak Velocity (m/sec): AoV=2.8  ------------------------------------------------------------------------  OBSERVATIONS:  Mitral Valve: Normal mitral valve. Moderate mitral  regurgitation.  Aortic Root: Normal aortic root.  Aortic Valve: Calcified aortic valve with decreased  opening, cannot exclude bicuspid. Peak transaortic valve  gradient equals 32.4 mm Hg, mean transaortic valve gradient  equals 19 mm Hg, dimensionless index 0.22, estimated aortic  valve area equals 0.6 sqcm (by continuity equation),  consistent with paradoxical low-flow low-gradient severe  aortic stenosis. Consider dobutamine stress echocardiogram  and/or SABIAH for further evaluation.  No aortic valve  regurgitation seen.  Left Atrium: Normal left atrium.  LA volume index = 26  cc/m2.  Left Ventricle: Moderate global left ventricular systolic  dysfunction (EF 40-45% by visual estimation). Moderate  concentric left ventricular hypertrophy. A false tendon is  noted. This is a normal variant.  Grade II diastolic  dysfunction (moderate).  Right Heart: Normal right atrium. Normal right ventricular  size with decreased RV systolic function (TAPSE 1.2 cm).  Tricuspid valve not well seen. Trace tricuspid  regurgitation. Normal pulmonic valve. Trace pulmonic  insufficiency is noted.  Pericardium/PleuraNo pericardial effusion. Bilateral  pleural effusions.  Hemodynamic: RA Pressure is 8 mm Hg. RV systolic pressure  is borderline normal at  34 mm Hg.  ------------------------------------------------------------------------  CONCLUSIONS:  1. Normal mitral valve. Moderate mitral regurgitation.  2. Calcified aortic valve with decreased opening, cannot  exclude bicuspid. Peak transaortic valve gradient equals  32.4 mm Hg, mean transaortic valve gradient equals 19 mm  Hg, dimensionless index 0.22, estimated aortic valve area  equals 0.6sqcm (by continuity equation), consistent with  paradoxical low-flow low-gradient severe aortic stenosis.  Consider dobutamine stress echocardiogram and/or SABIHA for  further evaluation.  No aortic valve regurgitation seen.  3. Normal aortic root.  4. Normal left atrium.  5. Moderate concentric left ventricular hypertrophy.  6. Moderate global left ventricular systolic dysfunction  (EF 40-45% by visual estimation).  7. Grade II diastolic dysfunction (moderate).  8. Normal right atrium.  9. Normal right ventricular size with decreased RV systolic  function (TAPSE 1.2 cm).  10. RV systolic pressure is borderline normal at  34 mm Hg.  11. Tricuspid valve not well seen. Trace tricuspid  regurgitation.  12. Normal pulmonic valve. Trace pulmonic insufficiency is  noted.  13. No pericardial effusion.  14. Bilateral pleural effusions.    < end of copied text >    Imaging  Reviewed:  YES    Consultant(s) Notes Reviewed:   YES      Plan of care was discussed with patient and /or primary care giver; all questions and concerns were addressed

## 2021-10-19 DIAGNOSIS — R74.01 ELEVATION OF LEVELS OF LIVER TRANSAMINASE LEVELS: ICD-10-CM

## 2021-10-19 LAB
ALBUMIN SERPL ELPH-MCNC: 2.4 G/DL — LOW (ref 3.5–5)
ALP SERPL-CCNC: 133 U/L — HIGH (ref 40–120)
ALT FLD-CCNC: 61 U/L DA — HIGH (ref 10–60)
ANION GAP SERPL CALC-SCNC: 8 MMOL/L — SIGNIFICANT CHANGE UP (ref 5–17)
AST SERPL-CCNC: 40 U/L — SIGNIFICANT CHANGE UP (ref 10–40)
BILIRUB SERPL-MCNC: 0.7 MG/DL — SIGNIFICANT CHANGE UP (ref 0.2–1.2)
BUN SERPL-MCNC: 15 MG/DL — SIGNIFICANT CHANGE UP (ref 7–18)
CALCIUM SERPL-MCNC: 9 MG/DL — SIGNIFICANT CHANGE UP (ref 8.4–10.5)
CHLORIDE SERPL-SCNC: 106 MMOL/L — SIGNIFICANT CHANGE UP (ref 96–108)
CO2 SERPL-SCNC: 26 MMOL/L — SIGNIFICANT CHANGE UP (ref 22–31)
CREAT SERPL-MCNC: 0.97 MG/DL — SIGNIFICANT CHANGE UP (ref 0.5–1.3)
GLUCOSE BLDC GLUCOMTR-MCNC: 105 MG/DL — HIGH (ref 70–99)
GLUCOSE BLDC GLUCOMTR-MCNC: 106 MG/DL — HIGH (ref 70–99)
GLUCOSE BLDC GLUCOMTR-MCNC: 108 MG/DL — HIGH (ref 70–99)
GLUCOSE BLDC GLUCOMTR-MCNC: 110 MG/DL — HIGH (ref 70–99)
GLUCOSE BLDC GLUCOMTR-MCNC: 115 MG/DL — HIGH (ref 70–99)
GLUCOSE BLDC GLUCOMTR-MCNC: 126 MG/DL — HIGH (ref 70–99)
GLUCOSE SERPL-MCNC: 109 MG/DL — HIGH (ref 70–99)
HCT VFR BLD CALC: 28.6 % — LOW (ref 39–50)
HGB BLD-MCNC: 9.2 G/DL — LOW (ref 13–17)
MAGNESIUM SERPL-MCNC: 2.1 MG/DL — SIGNIFICANT CHANGE UP (ref 1.6–2.6)
MCHC RBC-ENTMCNC: 29.3 PG — SIGNIFICANT CHANGE UP (ref 27–34)
MCHC RBC-ENTMCNC: 32.2 GM/DL — SIGNIFICANT CHANGE UP (ref 32–36)
MCV RBC AUTO: 91.1 FL — SIGNIFICANT CHANGE UP (ref 80–100)
NRBC # BLD: 0 /100 WBCS — SIGNIFICANT CHANGE UP (ref 0–0)
PHOSPHATE SERPL-MCNC: 3.4 MG/DL — SIGNIFICANT CHANGE UP (ref 2.5–4.5)
PLATELET # BLD AUTO: 241 K/UL — SIGNIFICANT CHANGE UP (ref 150–400)
POTASSIUM SERPL-MCNC: 3.9 MMOL/L — SIGNIFICANT CHANGE UP (ref 3.5–5.3)
POTASSIUM SERPL-SCNC: 3.9 MMOL/L — SIGNIFICANT CHANGE UP (ref 3.5–5.3)
PROT SERPL-MCNC: 6.8 G/DL — SIGNIFICANT CHANGE UP (ref 6–8.3)
RBC # BLD: 3.14 M/UL — LOW (ref 4.2–5.8)
RBC # FLD: 14.7 % — HIGH (ref 10.3–14.5)
SODIUM SERPL-SCNC: 140 MMOL/L — SIGNIFICANT CHANGE UP (ref 135–145)
WBC # BLD: 4.88 K/UL — SIGNIFICANT CHANGE UP (ref 3.8–10.5)
WBC # FLD AUTO: 4.88 K/UL — SIGNIFICANT CHANGE UP (ref 3.8–10.5)

## 2021-10-19 RX ORDER — PIPERACILLIN AND TAZOBACTAM 4; .5 G/20ML; G/20ML
3.38 INJECTION, POWDER, LYOPHILIZED, FOR SOLUTION INTRAVENOUS ONCE
Refills: 0 | Status: COMPLETED | OUTPATIENT
Start: 2021-10-19 | End: 2021-10-19

## 2021-10-19 RX ORDER — ACETAMINOPHEN 500 MG
650 TABLET ORAL EVERY 6 HOURS
Refills: 0 | Status: DISCONTINUED | OUTPATIENT
Start: 2021-10-19 | End: 2021-11-03

## 2021-10-19 RX ORDER — ACETAMINOPHEN 500 MG
650 TABLET ORAL ONCE
Refills: 0 | Status: COMPLETED | OUTPATIENT
Start: 2021-10-19 | End: 2021-10-19

## 2021-10-19 RX ORDER — PIPERACILLIN AND TAZOBACTAM 4; .5 G/20ML; G/20ML
3.38 INJECTION, POWDER, LYOPHILIZED, FOR SOLUTION INTRAVENOUS EVERY 8 HOURS
Refills: 0 | Status: DISCONTINUED | OUTPATIENT
Start: 2021-10-19 | End: 2021-11-03

## 2021-10-19 RX ORDER — ACETAMINOPHEN 500 MG
650 TABLET ORAL EVERY 4 HOURS
Refills: 0 | Status: DISCONTINUED | OUTPATIENT
Start: 2021-10-19 | End: 2021-10-19

## 2021-10-19 RX ORDER — HYDROMORPHONE HYDROCHLORIDE 2 MG/ML
0.5 INJECTION INTRAMUSCULAR; INTRAVENOUS; SUBCUTANEOUS ONCE
Refills: 0 | Status: DISCONTINUED | OUTPATIENT
Start: 2021-10-19 | End: 2021-10-19

## 2021-10-19 RX ADMIN — Medication 1 APPLICATION(S): at 11:23

## 2021-10-19 RX ADMIN — Medication 650 MILLIGRAM(S): at 17:04

## 2021-10-19 RX ADMIN — Medication 25 MILLIGRAM(S): at 17:05

## 2021-10-19 RX ADMIN — Medication 300 MILLIGRAM(S): at 11:23

## 2021-10-19 RX ADMIN — GABAPENTIN 100 MILLIGRAM(S): 400 CAPSULE ORAL at 21:50

## 2021-10-19 RX ADMIN — Medication 81 MILLIGRAM(S): at 11:23

## 2021-10-19 RX ADMIN — Medication 650 MILLIGRAM(S): at 18:24

## 2021-10-19 RX ADMIN — Medication 25 MILLIGRAM(S): at 06:02

## 2021-10-19 RX ADMIN — PIPERACILLIN AND TAZOBACTAM 25 GRAM(S): 4; .5 INJECTION, POWDER, LYOPHILIZED, FOR SOLUTION INTRAVENOUS at 13:18

## 2021-10-19 RX ADMIN — APIXABAN 5 MILLIGRAM(S): 2.5 TABLET, FILM COATED ORAL at 06:02

## 2021-10-19 RX ADMIN — GABAPENTIN 100 MILLIGRAM(S): 400 CAPSULE ORAL at 13:18

## 2021-10-19 RX ADMIN — HYDROMORPHONE HYDROCHLORIDE 0.5 MILLIGRAM(S): 2 INJECTION INTRAMUSCULAR; INTRAVENOUS; SUBCUTANEOUS at 03:50

## 2021-10-19 RX ADMIN — CHLORHEXIDINE GLUCONATE 1 APPLICATION(S): 213 SOLUTION TOPICAL at 06:02

## 2021-10-19 RX ADMIN — Medication 60 MILLIGRAM(S): at 06:03

## 2021-10-19 RX ADMIN — FINASTERIDE 5 MILLIGRAM(S): 5 TABLET, FILM COATED ORAL at 11:23

## 2021-10-19 RX ADMIN — PANTOPRAZOLE SODIUM 40 MILLIGRAM(S): 20 TABLET, DELAYED RELEASE ORAL at 21:42

## 2021-10-19 RX ADMIN — PREGABALIN 1000 MICROGRAM(S): 225 CAPSULE ORAL at 11:23

## 2021-10-19 RX ADMIN — PIPERACILLIN AND TAZOBACTAM 25 GRAM(S): 4; .5 INJECTION, POWDER, LYOPHILIZED, FOR SOLUTION INTRAVENOUS at 06:03

## 2021-10-19 RX ADMIN — HYDROMORPHONE HYDROCHLORIDE 0.5 MILLIGRAM(S): 2 INJECTION INTRAMUSCULAR; INTRAVENOUS; SUBCUTANEOUS at 04:06

## 2021-10-19 RX ADMIN — APIXABAN 5 MILLIGRAM(S): 2.5 TABLET, FILM COATED ORAL at 17:06

## 2021-10-19 RX ADMIN — PIPERACILLIN AND TAZOBACTAM 25 GRAM(S): 4; .5 INJECTION, POWDER, LYOPHILIZED, FOR SOLUTION INTRAVENOUS at 21:42

## 2021-10-19 RX ADMIN — ATORVASTATIN CALCIUM 80 MILLIGRAM(S): 80 TABLET, FILM COATED ORAL at 21:43

## 2021-10-19 RX ADMIN — PIPERACILLIN AND TAZOBACTAM 200 GRAM(S): 4; .5 INJECTION, POWDER, LYOPHILIZED, FOR SOLUTION INTRAVENOUS at 02:09

## 2021-10-19 RX ADMIN — GABAPENTIN 100 MILLIGRAM(S): 400 CAPSULE ORAL at 06:03

## 2021-10-19 NOTE — PROGRESS NOTE ADULT - ASSESSMENT
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 s/p OR debridement today. Nephrology consulted for abrupt rise in SCr.     1. OREN- Recurrent ATN in the setting of infection. Lisinopril discontinued 9/22. ATN resolving with good urine o/p. Now off diuretics; likely recovery phase   s/p CT chest with b/l mod to large pleural effusion R>L. s/p rt pigtail catheter (now removed). Monitor lytes  Kidney/ Bladder US with large distended bladder s/p ibrahim placement on 9/23, then removed. s/p bladder scan 10/4 with 1L urine for which ibrahim was reinserted; however; renal function did not improve post ibrahim; less likely due to obstructive uropathy.  Pt with had another TOV on 10/18 and failed; s/p ibrahim reinsertion; c/w finasteride; recc to add flomax. Recc outpt Urology f/u.   No peripheral eosinophilia- no signs of AIN. C3 & C4 wnl with neg ASO- no signs of PIGN. Strict I/Os. Avoid nephrotoxins/ NSAIDs/ RCA. Monitor BMP.    2. 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 Will defer secondary w/u as an outpt. Avoid nephrotoxins  3. HTN 2/2 CKD- BP acceptable. Continue with current anti-hypertensive medications. Monitor BP.  4. Acute osteomyelitis- s/p debridement 9/22. Pt now on Zosyn. Plan as per ID      [facility] NEPHROLOGY  Bryn Johnson M.D.  Domingo Booth D.O.  Zakia Rodriguez M.D.  Zonia Adams, MSN, ANP-C  [phone]    [address] [place] [address]

## 2021-10-19 NOTE — PROGRESS NOTE ADULT - PROBLEM SELECTOR PLAN 12
COVID exposure needs to test negative on 10/21, and can come off isolation  PT recommending STEVAN- [facility]
Improving  Hepatology following

## 2021-10-19 NOTE — PROGRESS NOTE ADULT - SUBJECTIVE AND OBJECTIVE BOX
Patient is seen and examined at the bed side, is afebrile. The COVID PCR need to be tested on  10/21/21.       REVIEW OF SYSTEMS: All other review systems are negative      ALLERGIES: No Known Allergies      Vital Signs Last 24 Hrs  T(C): 37 (19 Oct 2021 12:45), Max: 37 (19 Oct 2021 12:45)  T(F): 98.6 (19 Oct 2021 12:45), Max: 98.6 (19 Oct 2021 12:45)  HR: 74 (19 Oct 2021 12:45) (74 - 82)  BP: 142/55 (19 Oct 2021 12:45) (128/50 - 142/55)  BP(mean): --  RR: 18 (19 Oct 2021 12:45) (18 - 18)  SpO2: 100% (19 Oct 2021 12:45) (100% - 100%)      PHYSICAL EXAM:  GENERAL: Not in distress  CHEST/LUNG:  Not using accessory muscles, s/p removal of Right CT   HEART: s1 and s2 present  ABDOMEN:  Mild distended   EXTREMITIES: Right foot bandage in placed   CNS: Awake and alert       LABS:                        9.2    4.88  )-----------( 241      ( 19 Oct 2021 06:34 )             28.6                           9.1    4.51  )-----------( 238      ( 18 Oct 2021 07:50 )             27.6       10-19    140  |  106  |  15  ----------------------------<  109<H>  3.9   |  26  |  0.97    Ca    9.0      19 Oct 2021 06:34  Phos  3.4     10-19  Mg     2.1     10-19    TPro  6.8  /  Alb  2.4<L>  /  TBili  0.7  /  DBili  x   /  AST  40  /  ALT  61<H>  /  AlkPhos  133<H>  10-19             10-18    139  |  107  |  17  ----------------------------<  128<H>  3.9   |  23  |  0.97    Ca    8.9      18 Oct 2021 07:50  Mg     2.1     10-18    TPro  6.7  /  Alb  2.3<L>  /  TBili  0.8  /  DBili  x   /  AST  39  /  ALT  81<H>  /  AlkPhos  134<H>  10-17    09-25    139  |  107  |  41<H>  ----------------------------<  134<H>  4.1   |  21<L>  |  4.05<H>    Ca    8.6      25 Sep 2021 06:40    TPro  6.6  /  Alb  2.3<L>  /  TBili  0.5  /  DBili  x   /  AST  25  /  ALT  15  /  AlkPhos  102  09-25        CAPILLARY BLOOD GLUCOSE  POCT Blood Glucose.: 134 mg/dL (17 Sep 2021 17:11)  POCT Blood Glucose.: 128 mg/dL (17 Sep 2021 11:06)  POCT Blood Glucose.: 157 mg/dL (17 Sep 2021 04:10)      Vancomycin Level, Trough (10.14.21 @ 11:32) : 21.8  Vancomycin Level, Trough (10.12.21 @ 12:13)   Vancomycin Level, Trough: 19.5:      MEDICATIONS  (STANDING):    apixaban 5 milliGRAM(s) Oral two times a day  aspirin  chewable 81 milliGRAM(s) Oral daily  atorvastatin 80 milliGRAM(s) Oral at bedtime  chlorhexidine 2% Cloths 1 Application(s) Topical <User Schedule>  collagenase Ointment 1 Application(s) Topical daily  cyanocobalamin 1000 MICROGram(s) Oral daily  ergocalciferol 79516 Unit(s) Oral <User Schedule>  ferrous    sulfate Liquid 300 milliGRAM(s) Enteral Tube daily  finasteride 5 milliGRAM(s) Oral daily  gabapentin 100 milliGRAM(s) Oral three times a day  insulin lispro (ADMELOG) corrective regimen sliding scale   SubCutaneous every 6 hours  metoprolol tartrate 25 milliGRAM(s) Oral two times a day  NIFEdipine XL 60 milliGRAM(s) Oral daily  pantoprazole  Injectable 40 milliGRAM(s) IV Push at bedtime  piperacillin/tazobactam IVPB.. 3.375 Gram(s) IV Intermittent every 8 hours        RADIOLOGY & ADDITIONAL TESTS:    10/5/21 CT Chest No Cont (10.05.21 @ 14:07) Pulmonary edema with bilateral moderate to large pleural effusions. Underlying bilateral lower lobe compressive atelectasis versus pneumonia.  Mildly enlarged mediastinal lymph nodes, possibly reactive.      10/2/21: Xray Chest 1 View- PORTABLE-Routine (Xray Chest 1 View- PORTABLE-Routine in AM.) (10.02.21 @ 09:46) There is persistent bilateral perihilar/basilar diffuse airspace disease and/or RIGHT effusion.  Cardiomegaly.  No interval change.    Collected Date/Time: 9/22/2021 08:42 EDT   Received Date/Time: 9/23/2021 09:29 EDT   Surgical Pathology Report - Auth (Verified)   Specimen(s) Submitted   1 Right 5th Toe Wound Debridement r/o Osteomyelitis   Final Diagnosis   Right fifth toe; wound debridement:   - Bone with acute osteomyelitis and necrotic periosteal tissue.     9/28/21: US Abdomen Upper Quadrant Right (09.28.21 @ 12:13) Cholelithiasis without evidence of acute cholecystitis. Note is made of right pleural effusion    9/27/21: CT Abdomen and Pelvis w/ Oral Cont (09.27.21 @ 15:53) >No acute intra-abdominal pathology.    Small bilateral pleural effusions with compressive atelectasis of both lower lobes.    9/26/21: Xray Chest 1 View-PORTABLE IMMEDIATE (Xray Chest 1 View-PORTABLE IMMEDIATE .) (09.26.21 @ 15:28) : Small bilateral pleural effusions and mild pulmonary edema. A right lower lobe pneumonia is not excluded.      9/26/21: Xray Abdomen 1 View Portable, IMMEDIATE (Xray Abdomen 1 View Portable, IMMEDIATE .) (09.26.21 @ 15:28) : There is a nasogastric tube with its tip in the stomach. There is gaseous distention of the colon. No pathologic calcifications are seen. The osseous structures are intact with degenerative change is present in the spine.      9/17/21 : MR Foot No Cont, Right (09.17.21 @ 13:04) : Resection at the mid aspect of the fifth metatarsal. Lateral soft tissue wound with marrow signal abnormality throughout the fifth metatarsal and within the adjacent fourth metatarsal head which is nonspecific in the setting of recent surgery although osteomyelitis is suspected.    9/16/21 : Xray Foot AP + Lateral + Oblique, Right (09.16.21 @ 15:03) : No acute finding. No plain film evidence of osteomyelitis.        MICROBIOLOGY DATA:    COVID-19 PCR (10.11.21 @ 05:44)   COVID-19 PCR: NotDetec:   Urine Microscopic-Add On (NC) (09.22.21 @ 16:14)   Red Blood Cell - Urine: 0-2 /HPF   White Blood Cell - Urine: 3-5 /HPF   Bacteria: Moderate /HPF   Comment - Urine: yeast cells present   Epithelial Cells: Moderate /HPF     Culture - Tissue with Gram Stain (09.22.21 @ 13:54)   Gram Stain: No polymorphonuclear cells seen per low power field   No organisms seen per oil power field   Specimen Source: .Tissue Right 5th toe r/o osteomyeltis   Culture Results: No growth     COVID-19 David Domain Antibody (09.18.21 @ 12:55)   COVID-19 David Domain Antibody Result: >250.00:    Culture - Blood (09.17.21 @ 10:28)   Specimen Source: .Blood Blood-Peripheral   Culture Results: No growth to date.     Culture - Blood (09.17.21 @ 10:28)   Specimen Source: .Blood Blood-Venous   Culture Results: No growth to date.     Culture - Surgical Swab (09.16.21 @ 21:42)   - Amikacin: S <=16   - Amikacin: S <=16   - Amoxicillin/Clavulanic Acid: S <=8/4   - Ampicillin: R >16 These ampicillin results predict results for amoxicillin   - Ampicillin: S <=2 Predicts results to ampicillin/sulbactam, amoxacillin-clavulanate and piperacillin-tazobactam.   - Ampicillin/Sulbactam: S 8/4 Enterobacter, Citrobacter, and Serratia may develop resistance during prolonged therapy (3-4 days)   - Ampicillin/Sulbactam: R >16/8   - Aztreonam: S <=4   - Aztreonam: S <=4   - Cefazolin: R 16 Enterobacter, Citrobacter, and Serratia may develop resistance during prolonged therapy (3-4 days)   - Cefazolin: R >16   - Cefepime: S <=2   - Cefepime: S <=2   - Cefoxitin: S <=8   - Cefoxitin: S <=8   - Ceftazidime: S <=1   - Ceftriaxone: S <=1   - Ceftriaxone: S <=1 Enterobacter, Citrobacter, and Serratia may develop resistance during prolonged therapy   - Ciprofloxacin: S <=0.25   - Ciprofloxacin: S <=0.25   - Ertapenem: S <=0.5   - Gentamicin: S <=2   - Gentamicin: S <=2   - Imipenem: S <=1   - Levofloxacin: S <=0.5   - Levofloxacin: S <=0.5   - Meropenem: S <=1   - Meropenem: S <=1   - Piperacillin/Tazobactam: S <=8   - Piperacillin/Tazobactam: S <=8   - Tetra/Doxy: S 4   - Tobramycin: S <=2   - Trimethoprim/Sulfamethoxazole: S <=0.5/9.5   - Trimethoprim/Sulfamethoxazole: S <=0.5/9.5   - Vancomycin: S 2   Specimen Source: .Surgical Swab right foot wound   Culture Results:   Culture yields >4 types of aerobic and/or anaerobic bacteria   Call client services within 7 days if further workup is clinically   indicated. Culture includes   Rare Aeromonas hydrophila/caviae   Few Klebsiella oxytoca/Raoutella ornithinolytica   Few Enterococcus faecalis   Few Streptococcus agalactiae (Group B) isolated   Group B streptococci are susceptible to ampicillin,   penicillin and cefazolin, but may be resistant to   erythromycin and clindamycin.   Recommendations for intrapartum prophylaxis for Group B   streptococci are penicillin or ampicillin.   Organism Identification: Aeromonas hydrophila/caviae   Klebsiella oxytoca /Raoutella ornithinolytica   Enterococcus faecalis   Organism: Aeromonas hydrophila/caviae   Organism: Klebsiella oxytoca /Raoutella ornithinolytica   Organism: Enterococcus faecalis   COVID-19 PCR . (09.16.21 @ 22:24)   COVID-19 PCR: NotDetec:         Patient is seen and examined at the bed side, is afebrile. The COVID PCR need to be tested on 10/21/21.       REVIEW OF SYSTEMS: All other review systems are negative      ALLERGIES: No Known Allergies      Vital Signs Last 24 Hrs  T(C): 37 (19 Oct 2021 12:45), Max: 37 (19 Oct 2021 12:45)  T(F): 98.6 (19 Oct 2021 12:45), Max: 98.6 (19 Oct 2021 12:45)  HR: 74 (19 Oct 2021 12:45) (74 - 82)  BP: 142/55 (19 Oct 2021 12:45) (128/50 - 142/55)  BP(mean): --  RR: 18 (19 Oct 2021 12:45) (18 - 18)  SpO2: 100% (19 Oct 2021 12:45) (100% - 100%)      PHYSICAL EXAM:  GENERAL: Not in distress  CHEST/LUNG:  Not using accessory muscles, s/p removal of Right CT   HEART: s1 and s2 present  ABDOMEN:  Mild distended   EXTREMITIES: Right foot bandage in placed   CNS: Awake and alert       LABS:                        9.2    4.88  )-----------( 241      ( 19 Oct 2021 06:34 )             28.6                           9.1    4.51  )-----------( 238      ( 18 Oct 2021 07:50 )             27.6       10-19    140  |  106  |  15  ----------------------------<  109<H>  3.9   |  26  |  0.97    Ca    9.0      19 Oct 2021 06:34  Phos  3.4     10-19  Mg     2.1     10-19    TPro  6.8  /  Alb  2.4<L>  /  TBili  0.7  /  DBili  x   /  AST  40  /  ALT  61<H>  /  AlkPhos  133<H>  10-19             10-18    139  |  107  |  17  ----------------------------<  128<H>  3.9   |  23  |  0.97    Ca    8.9      18 Oct 2021 07:50  Mg     2.1     10-18    TPro  6.7  /  Alb  2.3<L>  /  TBili  0.8  /  DBili  x   /  AST  39  /  ALT  81<H>  /  AlkPhos  134<H>  10-17    09-25    139  |  107  |  41<H>  ----------------------------<  134<H>  4.1   |  21<L>  |  4.05<H>    Ca    8.6      25 Sep 2021 06:40    TPro  6.6  /  Alb  2.3<L>  /  TBili  0.5  /  DBili  x   /  AST  25  /  ALT  15  /  AlkPhos  102  09-25        CAPILLARY BLOOD GLUCOSE  POCT Blood Glucose.: 134 mg/dL (17 Sep 2021 17:11)  POCT Blood Glucose.: 128 mg/dL (17 Sep 2021 11:06)  POCT Blood Glucose.: 157 mg/dL (17 Sep 2021 04:10)      Vancomycin Level, Trough (10.14.21 @ 11:32) : 21.8  Vancomycin Level, Trough (10.12.21 @ 12:13)   Vancomycin Level, Trough: 19.5:      MEDICATIONS  (STANDING):    apixaban 5 milliGRAM(s) Oral two times a day  aspirin  chewable 81 milliGRAM(s) Oral daily  atorvastatin 80 milliGRAM(s) Oral at bedtime  chlorhexidine 2% Cloths 1 Application(s) Topical <User Schedule>  collagenase Ointment 1 Application(s) Topical daily  cyanocobalamin 1000 MICROGram(s) Oral daily  ergocalciferol 39126 Unit(s) Oral <User Schedule>  ferrous    sulfate Liquid 300 milliGRAM(s) Enteral Tube daily  finasteride 5 milliGRAM(s) Oral daily  gabapentin 100 milliGRAM(s) Oral three times a day  insulin lispro (ADMELOG) corrective regimen sliding scale   SubCutaneous every 6 hours  metoprolol tartrate 25 milliGRAM(s) Oral two times a day  NIFEdipine XL 60 milliGRAM(s) Oral daily  pantoprazole  Injectable 40 milliGRAM(s) IV Push at bedtime  piperacillin/tazobactam IVPB.. 3.375 Gram(s) IV Intermittent every 8 hours        RADIOLOGY & ADDITIONAL TESTS:    10/5/21 CT Chest No Cont (10.05.21 @ 14:07) Pulmonary edema with bilateral moderate to large pleural effusions. Underlying bilateral lower lobe compressive atelectasis versus pneumonia.  Mildly enlarged mediastinal lymph nodes, possibly reactive.      10/2/21: Xray Chest 1 View- PORTABLE-Routine (Xray Chest 1 View- PORTABLE-Routine in AM.) (10.02.21 @ 09:46) There is persistent bilateral perihilar/basilar diffuse airspace disease and/or RIGHT effusion.  Cardiomegaly.  No interval change.    Collected Date/Time: 9/22/2021 08:42 EDT   Received Date/Time: 9/23/2021 09:29 EDT   Surgical Pathology Report - Auth (Verified)   Specimen(s) Submitted   1 Right 5th Toe Wound Debridement r/o Osteomyelitis   Final Diagnosis   Right fifth toe; wound debridement:   - Bone with acute osteomyelitis and necrotic periosteal tissue.     9/28/21: US Abdomen Upper Quadrant Right (09.28.21 @ 12:13) Cholelithiasis without evidence of acute cholecystitis. Note is made of right pleural effusion    9/27/21: CT Abdomen and Pelvis w/ Oral Cont (09.27.21 @ 15:53) >No acute intra-abdominal pathology.    Small bilateral pleural effusions with compressive atelectasis of both lower lobes.    9/26/21: Xray Chest 1 View-PORTABLE IMMEDIATE (Xray Chest 1 View-PORTABLE IMMEDIATE .) (09.26.21 @ 15:28) : Small bilateral pleural effusions and mild pulmonary edema. A right lower lobe pneumonia is not excluded.      9/26/21: Xray Abdomen 1 View Portable, IMMEDIATE (Xray Abdomen 1 View Portable, IMMEDIATE .) (09.26.21 @ 15:28) : There is a nasogastric tube with its tip in the stomach. There is gaseous distention of the colon. No pathologic calcifications are seen. The osseous structures are intact with degenerative change is present in the spine.      9/17/21 : MR Foot No Cont, Right (09.17.21 @ 13:04) : Resection at the mid aspect of the fifth metatarsal. Lateral soft tissue wound with marrow signal abnormality throughout the fifth metatarsal and within the adjacent fourth metatarsal head which is nonspecific in the setting of recent surgery although osteomyelitis is suspected.    9/16/21 : Xray Foot AP + Lateral + Oblique, Right (09.16.21 @ 15:03) : No acute finding. No plain film evidence of osteomyelitis.        MICROBIOLOGY DATA:    COVID-19 PCR (10.11.21 @ 05:44)   COVID-19 PCR: NotDetec:   Urine Microscopic-Add On (NC) (09.22.21 @ 16:14)   Red Blood Cell - Urine: 0-2 /HPF   White Blood Cell - Urine: 3-5 /HPF   Bacteria: Moderate /HPF   Comment - Urine: yeast cells present   Epithelial Cells: Moderate /HPF     Culture - Tissue with Gram Stain (09.22.21 @ 13:54)   Gram Stain: No polymorphonuclear cells seen per low power field   No organisms seen per oil power field   Specimen Source: .Tissue Right 5th toe r/o osteomyeltis   Culture Results: No growth     COVID-19 David Domain Antibody (09.18.21 @ 12:55)   COVID-19 David Domain Antibody Result: >250.00:    Culture - Blood (09.17.21 @ 10:28)   Specimen Source: .Blood Blood-Peripheral   Culture Results: No growth to date.     Culture - Blood (09.17.21 @ 10:28)   Specimen Source: .Blood Blood-Venous   Culture Results: No growth to date.     Culture - Surgical Swab (09.16.21 @ 21:42)   - Amikacin: S <=16   - Amikacin: S <=16   - Amoxicillin/Clavulanic Acid: S <=8/4   - Ampicillin: R >16 These ampicillin results predict results for amoxicillin   - Ampicillin: S <=2 Predicts results to ampicillin/sulbactam, amoxacillin-clavulanate and piperacillin-tazobactam.   - Ampicillin/Sulbactam: S 8/4 Enterobacter, Citrobacter, and Serratia may develop resistance during prolonged therapy (3-4 days)   - Ampicillin/Sulbactam: R >16/8   - Aztreonam: S <=4   - Aztreonam: S <=4   - Cefazolin: R 16 Enterobacter, Citrobacter, and Serratia may develop resistance during prolonged therapy (3-4 days)   - Cefazolin: R >16   - Cefepime: S <=2   - Cefepime: S <=2   - Cefoxitin: S <=8   - Cefoxitin: S <=8   - Ceftazidime: S <=1   - Ceftriaxone: S <=1   - Ceftriaxone: S <=1 Enterobacter, Citrobacter, and Serratia may develop resistance during prolonged therapy   - Ciprofloxacin: S <=0.25   - Ciprofloxacin: S <=0.25   - Ertapenem: S <=0.5   - Gentamicin: S <=2   - Gentamicin: S <=2   - Imipenem: S <=1   - Levofloxacin: S <=0.5   - Levofloxacin: S <=0.5   - Meropenem: S <=1   - Meropenem: S <=1   - Piperacillin/Tazobactam: S <=8   - Piperacillin/Tazobactam: S <=8   - Tetra/Doxy: S 4   - Tobramycin: S <=2   - Trimethoprim/Sulfamethoxazole: S <=0.5/9.5   - Trimethoprim/Sulfamethoxazole: S <=0.5/9.5   - Vancomycin: S 2   Specimen Source: .Surgical Swab right foot wound   Culture Results:   Culture yields >4 types of aerobic and/or anaerobic bacteria   Call client services within 7 days if further workup is clinically   indicated. Culture includes   Rare Aeromonas hydrophila/caviae   Few Klebsiella oxytoca/Raoutella ornithinolytica   Few Enterococcus faecalis   Few Streptococcus agalactiae (Group B) isolated   Group B streptococci are susceptible to ampicillin,   penicillin and cefazolin, but may be resistant to   erythromycin and clindamycin.   Recommendations for intrapartum prophylaxis for Group B   streptococci are penicillin or ampicillin.   Organism Identification: Aeromonas hydrophila/caviae   Klebsiella oxytoca /Raoutella ornithinolytica   Enterococcus faecalis   Organism: Aeromonas hydrophila/caviae   Organism: Klebsiella oxytoca /Raoutella ornithinolytica   Organism: Enterococcus faecalis   COVID-19 PCR . (09.16.21 @ 22:24)   COVID-19 PCR: NotDetec:

## 2021-10-19 NOTE — PROGRESS NOTE ADULT - SUBJECTIVE AND OBJECTIVE BOX
C A R D I O L O G Y  **********************************    DATE OF SERVICE: 10-19-21    Patient denies chest pain or shortness of breath.   Review of symptoms otherwise negative.    acetaminophen   Tablet .. 650 milliGRAM(s) Oral every 4 hours PRN  acetaminophen   Tablet .. 650 milliGRAM(s) Oral every 6 hours PRN  apixaban 5 milliGRAM(s) Oral two times a day  aspirin  chewable 81 milliGRAM(s) Oral daily  atorvastatin 80 milliGRAM(s) Oral at bedtime  chlorhexidine 2% Cloths 1 Application(s) Topical <User Schedule>  collagenase Ointment 1 Application(s) Topical daily  cyanocobalamin 1000 MICROGram(s) Oral daily  ergocalciferol 16110 Unit(s) Oral <User Schedule>  ferrous    sulfate Liquid 300 milliGRAM(s) Enteral Tube daily  finasteride 5 milliGRAM(s) Oral daily  gabapentin 100 milliGRAM(s) Oral three times a day  insulin lispro (ADMELOG) corrective regimen sliding scale   SubCutaneous every 6 hours  metoprolol tartrate 25 milliGRAM(s) Oral two times a day  NIFEdipine XL 60 milliGRAM(s) Oral daily  ondansetron Injectable 4 milliGRAM(s) IV Push three times a day PRN  pantoprazole  Injectable 40 milliGRAM(s) IV Push at bedtime  piperacillin/tazobactam IVPB.. 3.375 Gram(s) IV Intermittent every 8 hours  sodium chloride 0.9% lock flush 10 milliLiter(s) IV Push every 1 hour PRN                            9.2    4.88  )-----------( 241      ( 19 Oct 2021 06:34 )             28.6       Hemoglobin: 9.2 g/dL (10-19 @ 06:34)  Hemoglobin: 9.1 g/dL (10-18 @ 07:50)  Hemoglobin: 9.5 g/dL (10-17 @ 06:20)  Hemoglobin: 8.7 g/dL (10-16 @ 06:33)  Hemoglobin: 8.4 g/dL (10-16 @ 04:04)      10-19    140  |  106  |  15  ----------------------------<  109<H>  3.9   |  26  |  0.97    Ca    9.0      19 Oct 2021 06:34  Phos  3.4     10-19  Mg     2.1     10-19    TPro  6.8  /  Alb  2.4<L>  /  TBili  0.7  /  DBili  x   /  AST  40  /  ALT  61<H>  /  AlkPhos  133<H>  10-19    Creatinine Trend: 0.97<--, 0.97<--, 1.05<--, 0.97<--, 0.99<--, 0.99<--    COAGS:       T(C): 36.2 (10-19-21 @ 05:11), Max: 36.5 (10-18-21 @ 13:10)  HR: 82 (10-19-21 @ 05:11) (75 - 82)  BP: 136/66 (10-19-21 @ 05:11) (128/50 - 147/72)  RR: 18 (10-19-21 @ 05:11) (18 - 20)  SpO2: 100% (10-19-21 @ 05:11) (100% - 100%)  Wt(kg): --    I&O's Summary    18 Oct 2021 07:01  -  19 Oct 2021 07:00  --------------------------------------------------------  IN: 0 mL / OUT: 301 mL / NET: -301 mL      HEENT:  (-)icterus (-)pallor  CV: N S1 S2 1/6 [place] (+)2 Pulses B/l  Resp:  Coarse sounds B/L, normal effort  GI: (+) BS Soft, NT, ND  Lymph:  (-)Edema, (-)obvious lymphadenopathy  Skin: Warm to touch, Normal turgor  Psych: Unable to adequately  mood and affect    Tele: SR      ASSESSMENT/PLAN: 	76y  Male PMH DM on insulin, HTN, HLD, normal lV fx moderate to severe AS 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    - crt improved  -  complete course of Abx per ID  - Echo noted, Severe AS, EF 40-45%   - He will need a SABIHA and R+L heart cath when he recovers and has no active infection  - Cont medical management of CAD  - Pt. with new onset PAF now on Eliquis  - Pig tail removed ojn 10/16  - D/C planning per medical team    Zka Wilkins MD, [facility]  [place] [phone]

## 2021-10-19 NOTE — PROGRESS NOTE ADULT - SUBJECTIVE AND OBJECTIVE BOX
`Patient is a 76y old  Male who presents with a chief complaint of R Foot Pain (18 Oct 2021 18:48)    PATIENT IS SEEN AND EXAMINED IN MEDICAL FLOOR.  KEENANT [    ]    MADDY [   ]      GT [   ]    ALLERGIES:  No Known Allergies      Daily     Daily     VITALS:    Vital Signs Last 24 Hrs  T(C): 36.2 (19 Oct 2021 05:11), Max: 36.5 (18 Oct 2021 13:10)  T(F): 97.2 (19 Oct 2021 05:11), Max: 97.7 (18 Oct 2021 13:10)  HR: 82 (19 Oct 2021 05:11) (75 - 82)  BP: 136/66 (19 Oct 2021 05:11) (128/50 - 147/72)  BP(mean): --  RR: 18 (19 Oct 2021 05:11) (18 - 20)  SpO2: 100% (19 Oct 2021 05:11) (100% - 100%)    LABS:    CBC Full  -  ( 19 Oct 2021 06:34 )  WBC Count : 4.88 K/uL  RBC Count : 3.14 M/uL  Hemoglobin : 9.2 g/dL  Hematocrit : 28.6 %  Platelet Count - Automated : 241 K/uL  Mean Cell Volume : 91.1 fl  Mean Cell Hemoglobin : 29.3 pg  Mean Cell Hemoglobin Concentration : 32.2 gm/dL  Auto Neutrophil # : x  Auto Lymphocyte # : x  Auto Monocyte # : x  Auto Eosinophil # : x  Auto Basophil # : x  Auto Neutrophil % : x  Auto Lymphocyte % : x  Auto Monocyte % : x  Auto Eosinophil % : x  Auto Basophil % : x      10-19    140  |  106  |  15  ----------------------------<  109<H>  3.9   |  26  |  0.97    Ca    9.0      19 Oct 2021 06:34  Phos  3.4     10-19  Mg     2.1     10-19    TPro  6.8  /  Alb  2.4<L>  /  TBili  0.7  /  DBili  x   /  AST  40  /  ALT  61<H>  /  AlkPhos  133<H>  10-19    CAPILLARY BLOOD GLUCOSE      POCT Blood Glucose.: 108 mg/dL (19 Oct 2021 07:49)  POCT Blood Glucose.: 110 mg/dL (19 Oct 2021 06:10)  POCT Blood Glucose.: 105 mg/dL (19 Oct 2021 00:00)  POCT Blood Glucose.: 116 mg/dL (18 Oct 2021 17:02)  POCT Blood Glucose.: 139 mg/dL (18 Oct 2021 11:37)        LIVER FUNCTIONS - ( 19 Oct 2021 06:34 )  Alb: 2.4 g/dL / Pro: 6.8 g/dL / ALK PHOS: 133 U/L / ALT: 61 U/L DA / AST: 40 U/L / GGT: x           Creatinine Trend: 0.97<--, 0.97<--, 1.05<--, 0.97<--, 0.99<--, 0.99<--  I&O's Summary    18 Oct 2021 07:01  -  19 Oct 2021 07:00  --------------------------------------------------------  IN: 0 mL / OUT: 301 mL / NET: -301 mL            .Body Fluid Pleural Fluid  10-08 @ 18:51   No growth at 1 week.  --  --      .Body Fluid Pleural Fluid  10-08 @ 18:34   No growth at 5 days  --    polymorphonuclear leukocytes seen  No organisms seen  by cytocentrifuge      .Tissue Right 5th toe r/o osteomyeltis  09-22 @ 13:54   No growth at 5 days  --    No polymorphonuclear cells seen per low power field  No organisms seen per oil power field      .Blood Blood-Venous  09-17 @ 10:28   No Growth Final  --  --      .Surgical Swab right foot wound  09-16 @ 21:42   Culture yields >4 types of aerobic and/or anaerobic bacteria  Call client services within 7 days if further workup is clinically  indicated. Culture includes  Rare Aeromonas hydrophila/caviae  Few Klebsiella oxytoca/Raoutella ornithinolytica  Few Enterococcus faecalis  Few Streptococcus agalactiae (Group B) isolated  Group B streptococci are susceptible to ampicillin,  penicillin and cefazolin, but may be resistant to  erythromycin and clindamycin.  Recommendations for intrapartum prophylaxis for Group B  streptococci are penicillin or ampicillin.  --  Aeromonas hydrophila/caviae  Klebsiella oxytoca /Raoutella ornithinolytica  Enterococcus faecalis      Bone R 5th metatarsal bone clean ma  08-20 @ 03:59   No growth at 5 days  --    No polymorphonuclear leukocytes seen per low power field  No organisms seen per oil power field      .Blood Blood-Venous  08-14 @ 21:09   No Growth Final  --  --      .Surgical Swab Right foot plantar wound  08-14 @ 21:08   Moderate Streptococcus agalactiae (Group B) isolated  Group B streptococci are susceptible to ampicillin,  penicillin and cefazolin, but may be resistant to  erythromycin and clindamycin.  Recommendations for intrapartum prophylaxis for Group B  streptococci are penicillin or ampicillin.  Moderate Bacteroides fragilis "Susceptibilities not performed"  Normal skin filiberto isolated  --  --          MEDICATIONS:    MEDICATIONS  (STANDING):  apixaban 5 milliGRAM(s) Oral two times a day  aspirin  chewable 81 milliGRAM(s) Oral daily  atorvastatin 80 milliGRAM(s) Oral at bedtime  chlorhexidine 2% Cloths 1 Application(s) Topical <User Schedule>  collagenase Ointment 1 Application(s) Topical daily  cyanocobalamin 1000 MICROGram(s) Oral daily  ergocalciferol 45924 Unit(s) Oral <User Schedule>  ferrous    sulfate Liquid 300 milliGRAM(s) Enteral Tube daily  finasteride 5 milliGRAM(s) Oral daily  gabapentin 100 milliGRAM(s) Oral three times a day  insulin lispro (ADMELOG) corrective regimen sliding scale   SubCutaneous every 6 hours  metoprolol tartrate 25 milliGRAM(s) Oral two times a day  NIFEdipine XL 60 milliGRAM(s) Oral daily  pantoprazole  Injectable 40 milliGRAM(s) IV Push at bedtime  piperacillin/tazobactam IVPB.. 3.375 Gram(s) IV Intermittent every 8 hours      MEDICATIONS  (PRN):  acetaminophen   Tablet .. 650 milliGRAM(s) Oral every 4 hours PRN Mild Pain (1 - 3)  acetaminophen   Tablet .. 650 milliGRAM(s) Oral every 6 hours PRN Temp greater or equal to 38C (100.4F), Moderate Pain (4 - 6)  ondansetron Injectable 4 milliGRAM(s) IV Push three times a day PRN Nausea and/or Vomiting  sodium chloride 0.9% lock flush 10 milliLiter(s) IV Push every 1 hour PRN Pre/post blood products, medications, blood draw, and to maintain line patency      REVIEW OF SYSTEMS:                           ALL ROS DONE [ X   ]    CONSTITUTIONAL:  LETHARGIC [   ], FEVER [   ], UNRESPONSIVE [   ]  CVS:  CP  [   ], SOB, [   ], PALPITATIONS [   ], DIZZYNESS [   ]  RS: COUGH [   ], SPUTUM [   ]  GI: ABDOMINAL PAIN [   ], NAUSEA [   ], VOMITINGS [   ], DIARRHEA [   ], CONSTIPATION [   ]  :  DYSURIA [   ], NOCTURIA [   ], INCREASED FREQUENCY [   ], DRIBLING [   ],  SKELETAL: PAINFUL JOINTS [   ], SWOLLEN JOINTS [   ], NECK ACHE [   ], LOW BACK ACHE [   ],  SKIN : ULCERS [   ], RASH [   ], ITCHING [   ]  CNS: HEAD ACHE [   ], DOUBLE VISION [   ], BLURRED VISION [   ], AMS / CONFUSION [   ], SEIZURES [   ], WEAKNESS [   ],TINGLING / NUMBNESS [   ]    PHYSICAL EXAMINATION:    GENERAL APPEARANCE: NO DISTRESS  HEENT:  NO PALLOR, NO  JVD,  NO   NODES, NECK SUPPLE  CVS: S1 +, S2 +,   RS: AEEB,  OCCASIONAL  RALES +,  RONCHI +, CRACKLES + [IMPROVING]   ABD: SOFT, NT, NO, BS +       EXT: NO PE  SKIN: WARM,   RIGHT FOOT WRAPPED  SKELETAL:  ROM ACCEPTABLE  CNS:  AAO X  2-3        RADIOLOGY :    < from: Transthoracic Echocardiogram (10.07.21 @ 15:26) >  CONCLUSIONS:  1. Normal mitral valve. Moderate mitral regurgitation.  2. Calcified aortic valve with decreased opening, cannot  exclude bicuspid. Peak transaortic valve gradient equals  32.4 mm Hg, mean transaortic valve gradient equals 19 mm  Hg, dimensionless index 0.22, estimated aortic valve area  equals 0.6sqcm (by continuity equation), consistent with  paradoxical low-flow low-gradient severe aortic stenosis.  Consider dobutamine stress echocardiogram and/or SABIHA for  further evaluation.  No aortic valve regurgitation seen.  3. Normal aortic root.  4. Normal left atrium.  5. Moderate concentric left ventricular hypertrophy.  6. Moderate global left ventricular systolic dysfunction  (EF 40-45% by visual estimation).  7. Grade II diastolic dysfunction (moderate).  8. Normal right atrium.  9. Normal right ventricular size with decreased RV systolic  function (TAPSE 1.2 cm).  10. RV systolic pressure is borderline normal at  34 mm Hg.  11. Tricuspid valve not well seen. Trace tricuspid  regurgitation.  12. Normal pulmonic valve. Trace pulmonic insufficiency is  noted.  13. No pericardial effusion.  14. Bilateral pleural effusions.    < end of copied text >      EXAM:  CT ABDOMEN AND PELVIS OC                            PROCEDURE DATE:  09/27/2021          INTERPRETATION:  CLINICAL INFORMATION: Small bowel obstruction.    COMPARISON: None.    CONTRAST/COMPLICATIONS:  IV Contrast: NONE  Oral Contrast: Omnipaque 300  Complications: None reported at time of study completion    PROCEDURE:  CT of the Abdomen and Pelvis was performed.  Sagittal and coronal reformats were performed.    FINDINGS:  LOWER CHEST: Small bilateral pleural effusions with compressiveatelectasis of both lower lobes.    LIVER: Within normal limits.  BILE DUCTS: Normal caliber.  GALLBLADDER: Distended. Small layering gallstones.  SPLEEN: Within normal limits.  PANCREAS: Within normal limits.  ADRENALS: Within normal limits.  KIDNEYS/URETERS: No hydronephrosis. Nonobstructing left upper pole calculus, measuring 0.6 cm.    BLADDER: Urinary bladder contains air and a Castañeda catheter balloon.  REPRODUCTIVE ORGANS: Prostate is not enlarged.    BOWEL: No bowel obstruction. Appendix isnormal. Colonic diverticulosis.  PERITONEUM: Mild presacral fluid.  VESSELS: Atherosclerotic changes.  RETROPERITONEUM/LYMPH NODES: No lymphadenopathy.  ABDOMINAL WALL: Within normal limits.  BONES: Degenerative changes. Sternotomy.    IMPRESSION:  No acute intra-abdominal pathology.    Small bilateral pleural effusions with compressive atelectasis of both lower lobes.    ====================================================    EXAM:  MR FOOT RT                            PROCEDURE DATE:  09/17/2021          INTERPRETATION:  Clinical Information: Recent fifth metatarsal head resection now with fifth digit pain, swelling and foul discharge.    Comparison: Radiographs of the right foot from 9/16/2021 and MRI the right foot from 8/16/2021.    Technique:  MRI of the right midfoot and forefoot.  Intravenous Contrast: None.    Findings:    There is a resection at the mid aspect of the fifth metatarsal shaft. There is a lateral soft tissue wound beginning at the level of the amputation which extends distally. There is susceptibility artifact in the region of the amputation consistent with postoperative change. There is hyperintense T2 marrow signal throughout the remaining fifth metatarsal and within the adjacent fourth metatarsal head which is nonspecific and could be related to recent postoperative changes although osteomyelitis is suspected.    There is edema and mild atrophy within the plantar muscles of the foot. There is minimal spurring at the first metatarsophalangeal and hallux sesamoid articulations.    Impression:  Resection at the mid aspect of the fifth metatarsal. Lateral soft tissue wound with marrow signal abnormality throughout the fifth metatarsal and within the adjacent fourth metatarsal head which is nonspecific in the setting of recent surgery although osteomyelitis is suspected.        ASSESSMENT :     Headache    HTN (hypertension)    HLD (hyperlipidemia)    DM (diabetes mellitus)    CAD (coronary artery disease)    Glaucoma, angle-closure    [place] (hard of hearing)      S/P CABG (coronary artery bypass graft)    History of thyroid surgery        PLAN:    HPI:  76M from home, lives with daughter w/ PMH DM on insulin, HTN, HLD, CAD, 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 Pt with sharp pain on the R lateral foot. As per daughter, pt was taking tylenol which did not help his pain prompting the patient to ask his daughter to take him to the hospital. Pt is a poor historian. Daughter states that the patient did not see his podiatrist after the surgery. No fever, chest, pain, palpitations, nausea, vomiting, diarrhea (17 Sep 2021 01:11)      # PATIENT IS S/P ICU MONITORING AND RIGHT CHEST TUBE REMOVAL ON 10/15/2021     # COVID EXPOSURE ON 10/13 - ON CONTACT AND DROPLET ISOLATION PRECAUTION; F/U COVID PCR    # SUSPECT RIGHT FOOT OSTEOMYELITIS S/P RIGHT 5TH RAY RESECTION [8/19] AND S/P DEBRIDEMENT, GRAFT APPLICATION, BONE BX AND WOUND VAC APPLICATION 9/22 - BONE BX W/ ACUTE OSTEOMYELITIS AND NECROTIC PERIOSTEAL TISSUE  ** RECENT ADMISSION FOR RIGHT DIABETIC FOOT ULCER, CELLULITIS, OSTEOMYELITIS RIGHT 5th METATARSAL S/P RIGHT 5TH PARTIAL RAY AMPUTATION [8/20] - BONE PATHOLOGY W/ CLEAN MARGINS    - S/P VANCOMYCIN AND ZOSYN ; NOW ON UNASYN AND LEVAQUIN GIVEN OREN; PER ID SWITCH TO ZOSYN UNTIL 11/3 [NOW ON MEROPENEM]  - RECENTLY HAD SIMON W/ MILD PAD  - REVIEWED WOUND CX [ ENTEROCOCCUS, AEROMONAS, KLEBSIELLA] AND BCX  - ID CONSULT, PODIATRY CONSULT    - PICC LINE PLACED TO COMPLETE 6 WEEKS OF ANTIBIOTICS - ON MEROPENEM UNTIL 11/3 ON D/C    # ACUTE HYPOXIC RESPIRATORY FAILURE DUE TO ACUTE ON CHRONIC SYSTOLIC CHF  (  LV EF 40- 45 % ) , DIASTOLIC LV DYSFUNCTION , ,  SEVERE AORTIC STENOSIS & MODERATE MITRAL REGURGITATION  &  ASPIRATION PNA;  - S/P CHEST TUBE INSERTION AND REMOVAL  , S/P LASIX ,   CARDIOLOGY CONSULT IN PROGRESS      - CHEST TUBE PLACED [10/8] - FLUID CONSISTENT WITH TRANSUDATIVE PROCESS  - S/P EXTUBATION 10/13  - S/P CHEST TUBE REMOVAL 10/15      # SEPTIC SHOCK - ? S/T HYPOVOLEMIA VS. SEPSIS - S/P CVC [SWITCHED FROM FEMORAL TO LEFT IJ], S/P VASOPRESSORS - RESOLVING  - BCX - NGTD  - ON MEROPENEM      # TRANSIENT NEW ONSET A.FIB, PAROXYSMAL - ON ELIQUIS, CARDIOLOGY CONSULT IN PROGRESS    # FUNGURIA - D/C FLUCONAZOLE GIVEN TRANSAMINITIS    # TRANSAMINITIS - SUSPECT THIS IS ISCHEMIC HEPATITIS - IMPROVING - HEPATOLOGY CONSULT IN PROGRESS    # BOWEL DISTENTION - ? ILEUS - OPTIMIZING ELECTROLYTES - IMPROVED  - SURGERY CONSULT IN PROGRESS    # CHOLELITHIASIS - TRENDING LFTS, RUQ U/S - CHOLELITHIASIS, SURGERY CONSULT IN PROGRESS  - GI CONSULT IN PROGRESS - LESS SUSPICIOUS FOR CHOLECYSTITIS    # DYSPEPSIA - PLACED ON PPI BID WITH IMPROVEMENT, UNDERWENT ST EVAL     # ELEVATED TROPONINS - NSTEMI - ? DEMAND - WILL NEED ISCHEMIC EVAL ONCE ACUTE INFECTION RESOLVES PER CARDIOLOGY, CARDIOLOGY CONSULT IN PROGRESS  - ON ASA, STATIN, BB    # OREN ON CKD - S/T ATN AND URINARY RETENTION - S/P IVF, NOW W/ CASTAÑEDA, NEPHROLOGY CONUSLT IN PROGRESS  -  BPH ON FLOMAX, AND FINASTERIDE  - FAILED TOV WITH WORSENING RENAL FXN - NOTED URINARY RETENTION [10/4] - REPLACED CASTAÑEDA  - TOV 10/18 - BLADDER SCAN BID TO EVALUATE FOR URINARY RETENTION    # MEDICAL CLEARANCE FOR SURGERY - RCRI - 2 - 10.1 % 30 DAY RISK OF DEATH, MI OR CARDIAC ARREST  - CARDIOLOGY CONSULT     # DM -  HBA1C - 11  - LANTUS, SSI + FS    # CAD S/P CABG - PLACED ON ASA, STATIN, BB  - ECHO - SEVERE AORTIC STENOSIS, SEVERE CONCENTRIC LVH, G1DD, MILD NM  - CARDIOLOGY CONSULT - DR. BASSETT   - MAY NEED ISCHEMIC EVAL ONCE ACUTE ILLNESS RESOLVES INCLUDING CARDIAC CATHETERIZATION    # HTN, HLD  - ON METOPROLOL, NIFEDIPINE, STATIN       #  IMPAIRED GAIT DUE TO CERVICAL & LS SPONDYLOSIS, POLY ARTHRITIS AND DIABETIC PERIPHERAL NEUROPATHY, OP VITAMIN D DEFICIENCY - ON CHOLECALCIFEROL, OBTAIN PT & OT   #  FAILURE TO THRIVE , MODERATE PROTEIN CALORIE MALNUTRITION       # CASE DISCUSSED AT LENGTH WITH PATIENT AND DAUGHTER, BIRDIE ROBERT AT BEDSIDE AND VIA PHONE @ [phone] [10/5] - CASE DICUSSED AT LENGTH AND ALL QUESTIONS WERE ANSWERED. DAUGHTER UNDERSTOOD THAT PROGNOSIS IS GUARDED.  # PATIENT AND DAUGHTER REFUSED [facility] ON PREVIOUS ADMISSION, PREVIOUSLY WISHED FOR PATIENT RETURN HOME W/ HOME PT; HOWEVER NOW, DAUGHTER WISHES FOR PATIENT TO GO TO [facility], AS PATIENT'S WIFE IS CURRENTLY ADMITTED THERE    # GI AND DVT PPX   Patient is a 76y old  Male who presents with a chief complaint of R Foot Pain (18 Oct 2021 18:48)    PATIENT IS SEEN AND EXAMINED IN MEDICAL FLOOR.    ALLERGIES:  No Known Allergies    VITALS:    Vital Signs Last 24 Hrs  T(C): 36.2 (19 Oct 2021 05:11), Max: 36.5 (18 Oct 2021 13:10)  T(F): 97.2 (19 Oct 2021 05:11), Max: 97.7 (18 Oct 2021 13:10)  HR: 82 (19 Oct 2021 05:11) (75 - 82)  BP: 136/66 (19 Oct 2021 05:11) (128/50 - 147/72)  BP(mean): --  RR: 18 (19 Oct 2021 05:11) (18 - 20)  SpO2: 100% (19 Oct 2021 05:11) (100% - 100%)    LABS:    CBC Full  -  ( 19 Oct 2021 06:34 )  WBC Count : 4.88 K/uL  RBC Count : 3.14 M/uL  Hemoglobin : 9.2 g/dL  Hematocrit : 28.6 %  Platelet Count - Automated : 241 K/uL  Mean Cell Volume : 91.1 fl  Mean Cell Hemoglobin : 29.3 pg  Mean Cell Hemoglobin Concentration : 32.2 gm/dL  Auto Neutrophil # : x  Auto Lymphocyte # : x  Auto Monocyte # : x  Auto Eosinophil # : x  Auto Basophil # : x  Auto Neutrophil % : x  Auto Lymphocyte % : x  Auto Monocyte % : x  Auto Eosinophil % : x  Auto Basophil % : x      10-19    140  |  106  |  15  ----------------------------<  109<H>  3.9   |  26  |  0.97    Ca    9.0      19 Oct 2021 06:34  Phos  3.4     10-19  Mg     2.1     10-19    TPro  6.8  /  Alb  2.4<L>  /  TBili  0.7  /  DBili  x   /  AST  40  /  ALT  61<H>  /  AlkPhos  133<H>  10-19    CAPILLARY BLOOD GLUCOSE      POCT Blood Glucose.: 108 mg/dL (19 Oct 2021 07:49)  POCT Blood Glucose.: 110 mg/dL (19 Oct 2021 06:10)  POCT Blood Glucose.: 105 mg/dL (19 Oct 2021 00:00)  POCT Blood Glucose.: 116 mg/dL (18 Oct 2021 17:02)  POCT Blood Glucose.: 139 mg/dL (18 Oct 2021 11:37)        LIVER FUNCTIONS - ( 19 Oct 2021 06:34 )  Alb: 2.4 g/dL / Pro: 6.8 g/dL / ALK PHOS: 133 U/L / ALT: 61 U/L DA / AST: 40 U/L / GGT: x           Creatinine Trend: 0.97<--, 0.97<--, 1.05<--, 0.97<--, 0.99<--, 0.99<--  I&O's Summary    18 Oct 2021 07:01  -  19 Oct 2021 07:00  --------------------------------------------------------  IN: 0 mL / OUT: 301 mL / NET: -301 mL            .Body Fluid Pleural Fluid  10-08 @ 18:51   No growth at 1 week.  --  --      .Body Fluid Pleural Fluid  10-08 @ 18:34   No growth at 5 days  --    polymorphonuclear leukocytes seen  No organisms seen  by cytocentrifuge      .Tissue Right 5th toe r/o osteomyeltis  09-22 @ 13:54   No growth at 5 days  --    No polymorphonuclear cells seen per low power field  No organisms seen per oil power field      .Blood Blood-Venous  09-17 @ 10:28   No Growth Final  --  --      .Surgical Swab right foot wound  09-16 @ 21:42   Culture yields >4 types of aerobic and/or anaerobic bacteria  Call client services within 7 days if further workup is clinically  indicated. Culture includes  Rare Aeromonas hydrophila/caviae  Few Klebsiella oxytoca/Raoutella ornithinolytica  Few Enterococcus faecalis  Few Streptococcus agalactiae (Group B) isolated  Group B streptococci are susceptible to ampicillin,  penicillin and cefazolin, but may be resistant to  erythromycin and clindamycin.  Recommendations for intrapartum prophylaxis for Group B  streptococci are penicillin or ampicillin.  --  Aeromonas hydrophila/caviae  Klebsiella oxytoca /Raoutella ornithinolytica  Enterococcus faecalis      Bone R 5th metatarsal bone clean ma  08-20 @ 03:59   No growth at 5 days  --    No polymorphonuclear leukocytes seen per low power field  No organisms seen per oil power field      .Blood Blood-Venous  08-14 @ 21:09   No Growth Final  --  --      .Surgical Swab Right foot plantar wound  08-14 @ 21:08   Moderate Streptococcus agalactiae (Group B) isolated  Group B streptococci are susceptible to ampicillin,  penicillin and cefazolin, but may be resistant to  erythromycin and clindamycin.  Recommendations for intrapartum prophylaxis for Group B  streptococci are penicillin or ampicillin.  Moderate Bacteroides fragilis "Susceptibilities not performed"  Normal skin filiberto isolated  --  --          MEDICATIONS:    MEDICATIONS  (STANDING):  apixaban 5 milliGRAM(s) Oral two times a day  aspirin  chewable 81 milliGRAM(s) Oral daily  atorvastatin 80 milliGRAM(s) Oral at bedtime  chlorhexidine 2% Cloths 1 Application(s) Topical <User Schedule>  collagenase Ointment 1 Application(s) Topical daily  cyanocobalamin 1000 MICROGram(s) Oral daily  ergocalciferol 65467 Unit(s) Oral <User Schedule>  ferrous    sulfate Liquid 300 milliGRAM(s) Enteral Tube daily  finasteride 5 milliGRAM(s) Oral daily  gabapentin 100 milliGRAM(s) Oral three times a day  insulin lispro (ADMELOG) corrective regimen sliding scale   SubCutaneous every 6 hours  metoprolol tartrate 25 milliGRAM(s) Oral two times a day  NIFEdipine XL 60 milliGRAM(s) Oral daily  pantoprazole  Injectable 40 milliGRAM(s) IV Push at bedtime  piperacillin/tazobactam IVPB.. 3.375 Gram(s) IV Intermittent every 8 hours      MEDICATIONS  (PRN):  acetaminophen   Tablet .. 650 milliGRAM(s) Oral every 4 hours PRN Mild Pain (1 - 3)  acetaminophen   Tablet .. 650 milliGRAM(s) Oral every 6 hours PRN Temp greater or equal to 38C (100.4F), Moderate Pain (4 - 6)  ondansetron Injectable 4 milliGRAM(s) IV Push three times a day PRN Nausea and/or Vomiting  sodium chloride 0.9% lock flush 10 milliLiter(s) IV Push every 1 hour PRN Pre/post blood products, medications, blood draw, and to maintain line patency      REVIEW OF SYSTEMS:                           ALL ROS DONE [ X   ]    CONSTITUTIONAL:  LETHARGIC [   ], FEVER [   ], UNRESPONSIVE [   ]  CVS:  CP  [   ], SOB, [   ], PALPITATIONS [   ], DIZZYNESS [   ]  RS: COUGH [   ], SPUTUM [   ]  GI: ABDOMINAL PAIN [   ], NAUSEA [   ], VOMITINGS [   ], DIARRHEA [   ], CONSTIPATION [   ]  :  DYSURIA [   ], NOCTURIA [   ], INCREASED FREQUENCY [   ], DRIBLING [   ],  SKELETAL: PAINFUL JOINTS [   ], SWOLLEN JOINTS [   ], NECK ACHE [   ], LOW BACK ACHE [   ],  SKIN : ULCERS [   ], RASH [   ], ITCHING [   ]  CNS: HEAD ACHE [   ], DOUBLE VISION [   ], BLURRED VISION [   ], AMS / CONFUSION [   ], SEIZURES [   ], WEAKNESS [   ],TINGLING / NUMBNESS [   ]    PHYSICAL EXAMINATION:    GENERAL APPEARANCE: NO DISTRESS  HEENT:  NO PALLOR, NO  JVD,  NO   NODES, NECK SUPPLE  CVS: S1 +, S2 +,   RS: AEEB,  OCCASIONAL  RALES +,  RONCHI +, CRACKLES + [IMPROVING]   ABD: SOFT, NT, NO, BS +       EXT: NO PE  SKIN: WARM,   RIGHT FOOT WRAPPED  SKELETAL:  ROM ACCEPTABLE  CNS:  AAO X  2-3        RADIOLOGY :    < from: Transthoracic Echocardiogram (10.07.21 @ 15:26) >  CONCLUSIONS:  1. Normal mitral valve. Moderate mitral regurgitation.  2. Calcified aortic valve with decreased opening, cannot  exclude bicuspid. Peak transaortic valve gradient equals  32.4 mm Hg, mean transaortic valve gradient equals 19 mm  Hg, dimensionless index 0.22, estimated aortic valve area  equals 0.6sqcm (by continuity equation), consistent with  paradoxical low-flow low-gradient severe aortic stenosis.  Consider dobutamine stress echocardiogram and/or SABIHA for  further evaluation.  No aortic valve regurgitation seen.  3. Normal aortic root.  4. Normal left atrium.  5. Moderate concentric left ventricular hypertrophy.  6. Moderate global left ventricular systolic dysfunction  (EF 40-45% by visual estimation).  7. Grade II diastolic dysfunction (moderate).  8. Normal right atrium.  9. Normal right ventricular size with decreased RV systolic  function (TAPSE 1.2 cm).  10. RV systolic pressure is borderline normal at  34 mm Hg.  11. Tricuspid valve not well seen. Trace tricuspid  regurgitation.  12. Normal pulmonic valve. Trace pulmonic insufficiency is  noted.  13. No pericardial effusion.  14. Bilateral pleural effusions.    < end of copied text >      EXAM:  CT ABDOMEN AND PELVIS OC                            PROCEDURE DATE:  09/27/2021          INTERPRETATION:  CLINICAL INFORMATION: Small bowel obstruction.    COMPARISON: None.    CONTRAST/COMPLICATIONS:  IV Contrast: NONE  Oral Contrast: Omnipaque 300  Complications: None reported at time of study completion    PROCEDURE:  CT of the Abdomen and Pelvis was performed.  Sagittal and coronal reformats were performed.    FINDINGS:  LOWER CHEST: Small bilateral pleural effusions with compressiveatelectasis of both lower lobes.    LIVER: Within normal limits.  BILE DUCTS: Normal caliber.  GALLBLADDER: Distended. Small layering gallstones.  SPLEEN: Within normal limits.  PANCREAS: Within normal limits.  ADRENALS: Within normal limits.  KIDNEYS/URETERS: No hydronephrosis. Nonobstructing left upper pole calculus, measuring 0.6 cm.    BLADDER: Urinary bladder contains air and a Castañeda catheter balloon.  REPRODUCTIVE ORGANS: Prostate is not enlarged.    BOWEL: No bowel obstruction. Appendix isnormal. Colonic diverticulosis.  PERITONEUM: Mild presacral fluid.  VESSELS: Atherosclerotic changes.  RETROPERITONEUM/LYMPH NODES: No lymphadenopathy.  ABDOMINAL WALL: Within normal limits.  BONES: Degenerative changes. Sternotomy.    IMPRESSION:  No acute intra-abdominal pathology.    Small bilateral pleural effusions with compressive atelectasis of both lower lobes.    ====================================================    EXAM:  MR FOOT RT                            PROCEDURE DATE:  09/17/2021          INTERPRETATION:  Clinical Information: Recent fifth metatarsal head resection now with fifth digit pain, swelling and foul discharge.    Comparison: Radiographs of the right foot from 9/16/2021 and MRI the right foot from 8/16/2021.    Technique:  MRI of the right midfoot and forefoot.  Intravenous Contrast: None.    Findings:    There is a resection at the mid aspect of the fifth metatarsal shaft. There is a lateral soft tissue wound beginning at the level of the amputation which extends distally. There is susceptibility artifact in the region of the amputation consistent with postoperative change. There is hyperintense T2 marrow signal throughout the remaining fifth metatarsal and within the adjacent fourth metatarsal head which is nonspecific and could be related to recent postoperative changes although osteomyelitis is suspected.    There is edema and mild atrophy within the plantar muscles of the foot. There is minimal spurring at the first metatarsophalangeal and hallux sesamoid articulations.    Impression:  Resection at the mid aspect of the fifth metatarsal. Lateral soft tissue wound with marrow signal abnormality throughout the fifth metatarsal and within the adjacent fourth metatarsal head which is nonspecific in the setting of recent surgery although osteomyelitis is suspected.        ASSESSMENT :     Headache    HTN (hypertension)    HLD (hyperlipidemia)    DM (diabetes mellitus)    CAD (coronary artery disease)    Glaucoma, angle-closure    [employer] (hard of hearing)      S/P CABG (coronary artery bypass graft)    History of thyroid surgery        PLAN:    HPI:  76M from home, lives with daughter w/ PMH DM on insulin, HTN, HLD, CAD, 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 Pt with sharp pain on the R lateral foot. As per daughter, pt was taking tylenol which did not help his pain prompting the patient to ask his daughter to take him to the hospital. Pt is a poor historian. Daughter states that the patient did not see his podiatrist after the surgery. No fever, chest, pain, palpitations, nausea, vomiting, diarrhea (17 Sep 2021 01:11)      # PATIENT IS S/P ICU MONITORING AND RIGHT CHEST TUBE REMOVAL ON 10/15/2021     # COVID EXPOSURE ON 10/13 - ON CONTACT AND DROPLET ISOLATION PRECAUTION; F/U COVID PCR    # SUSPECT RIGHT FOOT OSTEOMYELITIS S/P RIGHT 5TH RAY RESECTION [8/19] AND S/P DEBRIDEMENT, GRAFT APPLICATION, BONE BX AND WOUND VAC APPLICATION 9/22 - BONE BX W/ ACUTE OSTEOMYELITIS AND NECROTIC PERIOSTEAL TISSUE  ** RECENT ADMISSION FOR RIGHT DIABETIC FOOT ULCER, CELLULITIS, OSTEOMYELITIS RIGHT 5th METATARSAL S/P RIGHT 5TH PARTIAL RAY AMPUTATION [8/20] - BONE PATHOLOGY W/ CLEAN MARGINS    - S/P VANCOMYCIN AND ZOSYN ; NOW ON UNASYN AND LEVAQUIN GIVEN OREN; PER ID SWITCH TO ZOSYN UNTIL 11/3 [NOW ON MEROPENEM]  - RECENTLY HAD SIMON W/ MILD PAD  - REVIEWED WOUND CX [ ENTEROCOCCUS, AEROMONAS, KLEBSIELLA] AND BCX  - ID CONSULT, PODIATRY CONSULT    - PICC LINE PLACED TO COMPLETE 6 WEEKS OF ANTIBIOTICS - ON MEROPENEM UNTIL 11/3 ON D/C    # ACUTE HYPOXIC RESPIRATORY FAILURE DUE TO ACUTE ON CHRONIC SYSTOLIC CHF  (  LV EF 40- 45 % ) , DIASTOLIC LV DYSFUNCTION , ,  SEVERE AORTIC STENOSIS & MODERATE MITRAL REGURGITATION  &  ASPIRATION PNA;  - S/P CHEST TUBE INSERTION AND REMOVAL  , S/P LASIX ,   CARDIOLOGY CONSULT IN PROGRESS      - CHEST TUBE PLACED [10/8] - FLUID CONSISTENT WITH TRANSUDATIVE PROCESS  - S/P EXTUBATION 10/13  - S/P CHEST TUBE REMOVAL 10/15      # SEPTIC SHOCK - ? S/T HYPOVOLEMIA VS. SEPSIS - S/P CVC [SWITCHED FROM FEMORAL TO LEFT IJ], S/P VASOPRESSORS - RESOLVING  - BCX - NGTD  - ON MEROPENEM      # TRANSIENT NEW ONSET A.FIB, PAROXYSMAL - ON ELIQUIS, CARDIOLOGY CONSULT IN PROGRESS    # FUNGURIA - D/C FLUCONAZOLE GIVEN TRANSAMINITIS    # TRANSAMINITIS - SUSPECT THIS IS ISCHEMIC HEPATITIS - IMPROVING - HEPATOLOGY CONSULT IN PROGRESS    # BOWEL DISTENTION - ? ILEUS - OPTIMIZING ELECTROLYTES - IMPROVED  - SURGERY CONSULT IN PROGRESS    # CHOLELITHIASIS - TRENDING LFTS, RUQ U/S - CHOLELITHIASIS, SURGERY CONSULT IN PROGRESS  - GI CONSULT IN PROGRESS - LESS SUSPICIOUS FOR CHOLECYSTITIS    # DYSPEPSIA - PLACED ON PPI BID WITH IMPROVEMENT, UNDERWENT ST EVAL     # ELEVATED TROPONINS - NSTEMI - ? DEMAND - WILL NEED ISCHEMIC EVAL ONCE ACUTE INFECTION RESOLVES PER CARDIOLOGY, CARDIOLOGY CONSULT IN PROGRESS  - ON ASA, STATIN, BB    # OREN ON CKD - S/T ATN AND URINARY RETENTION - S/P IVF, NOW W/ CASTAÑEDA, NEPHROLOGY CONUSLT IN PROGRESS  -  BPH ON FLOMAX, AND FINASTERIDE  - FAILED TOV WITH WORSENING RENAL FXN - NOTED URINARY RETENTION [10/4] - REPLACED CASTAÑEDA  - TOV 10/18 - BLADDER SCAN BID TO EVALUATE FOR URINARY RETENTION - FAILED TOV  - OVERNIGHT 10/18 - CASTAÑEDA REPLACED    # MEDICAL CLEARANCE FOR SURGERY - RCRI - 2 - 10.1 % 30 DAY RISK OF DEATH, MI OR CARDIAC ARREST  - CARDIOLOGY CONSULT     # DM -  HBA1C - 11  - LANTUS, SSI + FS    # CAD S/P CABG - PLACED ON ASA, STATIN, BB  - ECHO - SEVERE AORTIC STENOSIS, SEVERE CONCENTRIC LVH, G1DD, MILD MN  - CARDIOLOGY CONSULT - DR. BASSETT   - MAY NEED ISCHEMIC EVAL ONCE ACUTE ILLNESS RESOLVES INCLUDING CARDIAC CATHETERIZATION    # HTN, HLD  - ON METOPROLOL, NIFEDIPINE, STATIN       #  IMPAIRED GAIT DUE TO CERVICAL & LS SPONDYLOSIS, POLY ARTHRITIS AND DIABETIC PERIPHERAL NEUROPATHY, OP VITAMIN D DEFICIENCY - ON CHOLECALCIFEROL, OBTAIN PT & OT   #  FAILURE TO THRIVE , MODERATE PROTEIN CALORIE MALNUTRITION       # CASE DISCUSSED AT LENGTH WITH PATIENT AND DAUGHTER, BIRDIE ROBERT AT BEDSIDE AND VIA PHONE @ [phone] [10/5] - CASE DICUSSED AT LENGTH AND ALL QUESTIONS WERE ANSWERED. DAUGHTER UNDERSTOOD THAT PROGNOSIS IS GUARDED.  # PATIENT AND DAUGHTER REFUSED [facility] ON PREVIOUS ADMISSION, PREVIOUSLY WISHED FOR PATIENT RETURN HOME W/ HOME PT; HOWEVER NOW, DAUGHTER WISHES FOR PATIENT TO GO TO [facility], AS PATIENT'S WIFE IS CURRENTLY ADMITTED THERE    # GI AND DVT PPX

## 2021-10-19 NOTE — PROGRESS NOTE ADULT - SUBJECTIVE AND OBJECTIVE BOX
NP Note discussed with  Primary Attending    Patient is a 76y old  Male who presents with a chief complaint of R Foot Pain (19 Oct 2021 11:08)      INTERVAL HPI/OVERNIGHT EVENTS: Patient seen and examined at bedside, no new complaints    MEDICATIONS  (STANDING):  apixaban 5 milliGRAM(s) Oral two times a day  aspirin  chewable 81 milliGRAM(s) Oral daily  atorvastatin 80 milliGRAM(s) Oral at bedtime  chlorhexidine 2% Cloths 1 Application(s) Topical <User Schedule>  collagenase Ointment 1 Application(s) Topical daily  cyanocobalamin 1000 MICROGram(s) Oral daily  ergocalciferol 35408 Unit(s) Oral <User Schedule>  ferrous    sulfate Liquid 300 milliGRAM(s) Enteral Tube daily  finasteride 5 milliGRAM(s) Oral daily  gabapentin 100 milliGRAM(s) Oral three times a day  insulin lispro (ADMELOG) corrective regimen sliding scale   SubCutaneous every 6 hours  metoprolol tartrate 25 milliGRAM(s) Oral two times a day  NIFEdipine XL 60 milliGRAM(s) Oral daily  pantoprazole  Injectable 40 milliGRAM(s) IV Push at bedtime  piperacillin/tazobactam IVPB.. 3.375 Gram(s) IV Intermittent every 8 hours    MEDICATIONS  (PRN):  acetaminophen   Tablet .. 650 milliGRAM(s) Oral every 4 hours PRN Mild Pain (1 - 3)  acetaminophen   Tablet .. 650 milliGRAM(s) Oral every 6 hours PRN Temp greater or equal to 38C (100.4F), Moderate Pain (4 - 6)  ondansetron Injectable 4 milliGRAM(s) IV Push three times a day PRN Nausea and/or Vomiting  sodium chloride 0.9% lock flush 10 milliLiter(s) IV Push every 1 hour PRN Pre/post blood products, medications, blood draw, and to maintain line patency      __________________________________________________  REVIEW OF SYSTEMS:    CONSTITUTIONAL: No fever,   EYES: no acute visual disturbances  NECK: No pain or stiffness  RESPIRATORY: No cough; No shortness of breath  CARDIOVASCULAR: No chest pain, no palpitations  GASTROINTESTINAL: No pain. No nausea or vomiting; No diarrhea   NEUROLOGICAL: No headache or numbness, no tremors  MUSCULOSKELETAL: lower back pain  GENITOURINARY: no dysuria, no frequency, no hesitancy  PSYCHIATRY: no depression , no anxiety  ALL OTHER  ROS negative        Vital Signs Last 24 Hrs  T(C): 36.2 (19 Oct 2021 05:11), Max: 36.5 (18 Oct 2021 13:10)  T(F): 97.2 (19 Oct 2021 05:11), Max: 97.7 (18 Oct 2021 13:10)  HR: 82 (19 Oct 2021 05:11) (75 - 82)  BP: 136/66 (19 Oct 2021 05:11) (128/50 - 147/72)  BP(mean): --  RR: 18 (19 Oct 2021 05:11) (18 - 20)  SpO2: 100% (19 Oct 2021 05:11) (100% - 100%)    ________________________________________________  PHYSICAL EXAM:  GENERAL: NAD  HEENT: Normocephalic;  conjunctivae and sclerae clear; moist mucous membranes;   NECK : supple  CHEST/LUNG: Clear to auscultation bilaterally with good air entry   HEART: S1 S2  regular; no murmurs, gallops or rubs  ABDOMEN: Soft, Nontender, Nondistended; Bowel sounds present  EXTREMITIES: right foot ace bandage, no cyanosis; no edema; no calf tenderness  SKIN: right foot ulceration, perianal wound  NERVOUS SYSTEM:  Awake and alert; Oriented  to person    _________________________________________________  LABS:                        9.2    4.88  )-----------( 241      ( 19 Oct 2021 06:34 )             28.6     10-19    140  |  106  |  15  ----------------------------<  109<H>  3.9   |  26  |  0.97    Ca    9.0      19 Oct 2021 06:34  Phos  3.4     10-19  Mg     2.1     10-19    TPro  6.8  /  Alb  2.4<L>  /  TBili  0.7  /  DBili  x   /  AST  40  /  ALT  61<H>  /  AlkPhos  133<H>  10-19        CAPILLARY BLOOD GLUCOSE      POCT Blood Glucose.: 108 mg/dL (19 Oct 2021 07:49)  POCT Blood Glucose.: 110 mg/dL (19 Oct 2021 06:10)  POCT Blood Glucose.: 105 mg/dL (19 Oct 2021 00:00)  POCT Blood Glucose.: 116 mg/dL (18 Oct 2021 17:02)  POCT Blood Glucose.: 139 mg/dL (18 Oct 2021 11:37)    RADIOLOGY & ADDITIONAL TESTS:  < from: Xray Abdomen 1 View PORTABLE -Urgent (Xray Abdomen 1 View PORTABLE -Urgent .) (10.16.21 @ 20:25) >    EXAM:  XR ABDOMEN PORTABLE URGENT 1V                            PROCEDURE DATE:  10/16/2021          INTERPRETATION:  Suspect ileus.    AP portable supine. Prior 10/6/2021.    IMPRESSION:  Nonspecific bowel gas pattern. No definite obstruction. No opaque urinary biliary calculi. Vascular calcification. Advanced degenerative change thoracolumbar spine.    < end of copied text >    Imaging  Reviewed:  YES    Consultant(s) Notes Reviewed:   YES      Plan of care was discussed with patient and /or primary care giver; all questions and concerns were addressed

## 2021-10-19 NOTE — PROGRESS NOTE ADULT - ASSESSMENT
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  As per daughter, patient was taking tylenol which did not help his pain prompting the patient to ask his daughter to take him to the hospital. ON admission, he has no fever or Leukocytosis. The Xray of Right foot shows no Osteomyelitis but MRI of Right foot shows Lateral soft tissue wound with marrow signal abnormality throughout the fifth metatarsal which is consistent of Osteomyelitis. He has seen by Podiatry and wound culture has sent, Zosyn and Vancomycin has started. The ID consult requested to assist with further evaluation and antibiotic management.     # Right Fifth metatarsal DFU with drainage and Osteomyelitis- wound cx grew Enterococcus, Aeromonas and Klebsiella - Zosyn is the ideal to cover All organisms but kidney function is worsening, hence change to Unasyn and Levaquin until kidney function is improved - The pathology shows Bone with acute osteomyelitis and necrotic periosteal tissue.   # OREN- s/p urinary retention -s/p ibrahim catheter - s/p discontinue ACEI  # RLL pneumonia- most likely Aspiration, S/p Vomiting - On Unasyn  # Large bowel distension  # Candiduria- 9/22/21  # Pulmonary edema with bilateral moderate to large pleural effusions- on CT chest, 10/5/21- s/p intubation 10/7- s/p Right sided chest tube placement by CT team, 10/8/21  # COVID Exposure - PCR negative as of  10/11/21    would recommend:    1. Continue Zosyn  since kidney function is normal   2. Monitor  kidney function closely while on Zosyn   3. Wound care as per Podiatry  4. Aspiration precaution  5. Need IV Abx until 11/3/21    d/w Covering NP    Attending Attestation:    Spent more than 35 minutes on total encounter, more than 50 % of the visit was spent counseling and/or coordinating care by the Attending physician.

## 2021-10-19 NOTE — PROGRESS NOTE ADULT - PROBLEM SELECTOR PLAN 3
f/u COVID 10/21  negative until 10/21 can discontinue isolation on 10/24, spoke with Infection Control

## 2021-10-19 NOTE — PROGRESS NOTE ADULT - SUBJECTIVE AND OBJECTIVE BOX
[facility] NEPHROLOGY- PROGRESS NOTE    Patient is a 77yo Male with DM on insulin, HTN, HLD, CAD, s/p R partial 5th ray amputation 8/19/2021 p/w R foot pain and foul drainage a/w diabetic foot ulcer suspicious for osteomyelitis. s/p OR debridement today. Nephrology consulted for abrupt rise in SCr.   s/p ibrahim placement for distended bladder on 9/23 with ~400ml of urine o/p  9/27- pt with SOB; CXR with pulmonary edema- pt given Lasix 80mg IV x1. Concern for aspiration PNA  Course BP: s/p lasix 60mg IV on 10/1 & 10/2 and s/p Lasix 40mg IV x1 today 10/4. Bladder scan with 1L urine; s/p ibrahim reinserted  Transferred to ICU for acute hypoxic respiratory failure, s/p intubated on 10/7, s/p extubated 10/13  s/p Rt pig tail removal   Failed TOV on 10/18; ibrahim reinserted    Hospital Medications: Medications reviewed.  REVIEW OF SYSTEMS: Pt denies any SOB, chest pain, n/v/d, abd pain or LE edema +b/l foot pain      VITALS:  T(F): 97.2 (10-19-21 @ 05:11), Max: 97.7 (10-18-21 @ 13:10)  HR: 82 (10-19-21 @ 05:11)  BP: 136/66 (10-19-21 @ 05:11)  RR: 18 (10-19-21 @ 05:11)  SpO2: 100% (10-19-21 @ 05:11)  Wt(kg): --    10-18 @ 07:01  -  10-19 @ 07:00  --------------------------------------------------------  IN: 0 mL / OUT: 301 mL / NET: -301 mL      PHYSICAL EXAM:  Gen: NAD  Cards: RRR, +S1/S2, +TRINIDAD  Resp: mild rales at bases  GI: soft,   : +ibrahim  Extremities: no LE edema B/L  Derm: Rt foot wrapped     LABS:  10-18    139  |  107  |  17  ----------------------------<  128<H>  3.9   |  23  |  0.97    Ca    8.9      18 Oct 2021 07:50  Mg     2.1     10-18    TPro  6.7  /  Alb  2.3<L>  /  TBili  0.8  /  DBili      /  AST  39  /  ALT  81<H>  /  AlkPhos  134<H>  10-17    Creatinine Trend: 0.97 <--, 1.05 <--, 0.97 <--, 0.99 <--, 0.99 <--, 1.31 <--, 1.43 <--                        9.1    4.51  )-----------( 238      ( 18 Oct 2021 07:50 )             27.6     Urine Studies:

## 2021-10-19 NOTE — PROGRESS NOTE ADULT - PROBLEM SELECTOR PLAN 8
OREN resolved  SCr 0.94  Failed TOV- will need to continue ibrahim and outpatient f/u with Urology  closely monitor SCr, as patient is restarted on Zosyn

## 2021-10-19 NOTE — PROGRESS NOTE ADULT - PROBLEM SELECTOR PLAN 1
surgical pathology OM  wound culture resulting Enterococcus Aeromonal and Klebsiella  Meropenem switched to Zosyn as SCr normal, continue closely monitor for Scr.- until 11/3  podiatry dressing change  ID Dr. Barrett

## 2021-10-19 NOTE — PROGRESS NOTE ADULT - PROBLEM SELECTOR PLAN 9
see ECHO as above  will need outpatient cardiac catheterization once infection treated  Cardiologiy Dr. Wilkins

## 2021-10-19 NOTE — PROGRESS NOTE ADULT - PROBLEM SELECTOR PLAN 2
secondary to pleural effusion from CHF   fluid consistent with transudative  process  S/P extubation on 10/13  pigtail removed 10/15 CXR clear

## 2021-10-20 LAB
ALBUMIN SERPL ELPH-MCNC: 2.5 G/DL — LOW (ref 3.5–5)
ALP SERPL-CCNC: 129 U/L — HIGH (ref 40–120)
ALT FLD-CCNC: 54 U/L DA — SIGNIFICANT CHANGE UP (ref 10–60)
ANION GAP SERPL CALC-SCNC: 7 MMOL/L — SIGNIFICANT CHANGE UP (ref 5–17)
AST SERPL-CCNC: 32 U/L — SIGNIFICANT CHANGE UP (ref 10–40)
BILIRUB SERPL-MCNC: 0.6 MG/DL — SIGNIFICANT CHANGE UP (ref 0.2–1.2)
BUN SERPL-MCNC: 11 MG/DL — SIGNIFICANT CHANGE UP (ref 7–18)
CALCIUM SERPL-MCNC: 9.2 MG/DL — SIGNIFICANT CHANGE UP (ref 8.4–10.5)
CHLORIDE SERPL-SCNC: 109 MMOL/L — HIGH (ref 96–108)
CO2 SERPL-SCNC: 27 MMOL/L — SIGNIFICANT CHANGE UP (ref 22–31)
CREAT SERPL-MCNC: 1 MG/DL — SIGNIFICANT CHANGE UP (ref 0.5–1.3)
GLUCOSE BLDC GLUCOMTR-MCNC: 105 MG/DL — HIGH (ref 70–99)
GLUCOSE BLDC GLUCOMTR-MCNC: 109 MG/DL — HIGH (ref 70–99)
GLUCOSE BLDC GLUCOMTR-MCNC: 118 MG/DL — HIGH (ref 70–99)
GLUCOSE BLDC GLUCOMTR-MCNC: 126 MG/DL — HIGH (ref 70–99)
GLUCOSE BLDC GLUCOMTR-MCNC: 93 MG/DL — SIGNIFICANT CHANGE UP (ref 70–99)
GLUCOSE SERPL-MCNC: 110 MG/DL — HIGH (ref 70–99)
HCT VFR BLD CALC: 30.7 % — LOW (ref 39–50)
HGB BLD-MCNC: 9.7 G/DL — LOW (ref 13–17)
MCHC RBC-ENTMCNC: 28.9 PG — SIGNIFICANT CHANGE UP (ref 27–34)
MCHC RBC-ENTMCNC: 31.6 GM/DL — LOW (ref 32–36)
MCV RBC AUTO: 91.4 FL — SIGNIFICANT CHANGE UP (ref 80–100)
NRBC # BLD: 0 /100 WBCS — SIGNIFICANT CHANGE UP (ref 0–0)
PLATELET # BLD AUTO: 245 K/UL — SIGNIFICANT CHANGE UP (ref 150–400)
POTASSIUM SERPL-MCNC: 3.8 MMOL/L — SIGNIFICANT CHANGE UP (ref 3.5–5.3)
POTASSIUM SERPL-SCNC: 3.8 MMOL/L — SIGNIFICANT CHANGE UP (ref 3.5–5.3)
PROT SERPL-MCNC: 6.8 G/DL — SIGNIFICANT CHANGE UP (ref 6–8.3)
RBC # BLD: 3.36 M/UL — LOW (ref 4.2–5.8)
RBC # FLD: 14.6 % — HIGH (ref 10.3–14.5)
SODIUM SERPL-SCNC: 143 MMOL/L — SIGNIFICANT CHANGE UP (ref 135–145)
WBC # BLD: 4.07 K/UL — SIGNIFICANT CHANGE UP (ref 3.8–10.5)
WBC # FLD AUTO: 4.07 K/UL — SIGNIFICANT CHANGE UP (ref 3.8–10.5)

## 2021-10-20 PROCEDURE — 99232 SBSQ HOSP IP/OBS MODERATE 35: CPT

## 2021-10-20 RX ADMIN — Medication 60 MILLIGRAM(S): at 05:56

## 2021-10-20 RX ADMIN — PIPERACILLIN AND TAZOBACTAM 25 GRAM(S): 4; .5 INJECTION, POWDER, LYOPHILIZED, FOR SOLUTION INTRAVENOUS at 21:40

## 2021-10-20 RX ADMIN — Medication 25 MILLIGRAM(S): at 05:54

## 2021-10-20 RX ADMIN — FINASTERIDE 5 MILLIGRAM(S): 5 TABLET, FILM COATED ORAL at 11:56

## 2021-10-20 RX ADMIN — CHLORHEXIDINE GLUCONATE 1 APPLICATION(S): 213 SOLUTION TOPICAL at 05:57

## 2021-10-20 RX ADMIN — PIPERACILLIN AND TAZOBACTAM 25 GRAM(S): 4; .5 INJECTION, POWDER, LYOPHILIZED, FOR SOLUTION INTRAVENOUS at 13:27

## 2021-10-20 RX ADMIN — ATORVASTATIN CALCIUM 80 MILLIGRAM(S): 80 TABLET, FILM COATED ORAL at 21:40

## 2021-10-20 RX ADMIN — PREGABALIN 1000 MICROGRAM(S): 225 CAPSULE ORAL at 11:56

## 2021-10-20 RX ADMIN — GABAPENTIN 100 MILLIGRAM(S): 400 CAPSULE ORAL at 21:40

## 2021-10-20 RX ADMIN — PIPERACILLIN AND TAZOBACTAM 25 GRAM(S): 4; .5 INJECTION, POWDER, LYOPHILIZED, FOR SOLUTION INTRAVENOUS at 05:54

## 2021-10-20 RX ADMIN — GABAPENTIN 100 MILLIGRAM(S): 400 CAPSULE ORAL at 05:57

## 2021-10-20 RX ADMIN — Medication 300 MILLIGRAM(S): at 11:56

## 2021-10-20 RX ADMIN — GABAPENTIN 100 MILLIGRAM(S): 400 CAPSULE ORAL at 13:27

## 2021-10-20 RX ADMIN — Medication 1 APPLICATION(S): at 11:57

## 2021-10-20 RX ADMIN — Medication 81 MILLIGRAM(S): at 11:56

## 2021-10-20 RX ADMIN — PANTOPRAZOLE SODIUM 40 MILLIGRAM(S): 20 TABLET, DELAYED RELEASE ORAL at 21:40

## 2021-10-20 RX ADMIN — Medication 25 MILLIGRAM(S): at 17:56

## 2021-10-20 RX ADMIN — APIXABAN 5 MILLIGRAM(S): 2.5 TABLET, FILM COATED ORAL at 05:54

## 2021-10-20 RX ADMIN — APIXABAN 5 MILLIGRAM(S): 2.5 TABLET, FILM COATED ORAL at 17:56

## 2021-10-20 NOTE — PROGRESS NOTE ADULT - ASSESSMENT
75yo Male with Hx of HTN, HLD, CAD, s/p CABG, severe AS, PAD, s/p R partial 5th ray amputation (8/19/21) presented to [facility] ED on 9/16/21 with R foot pain and foul drainage a/w diabetic foot ulcer  (wound Cx grew Enterococcus, Aeromonas, Klebsiella, Group B Strep 9/16). He was found to have acute osteomyelitis,  s/p OR debridement, bone biopsy, wound vac on 9/22/21. Also noted OREN, and acute hypoxic respiratory failure due to pulmonary edema in setting of severe AS + aspirational PNA, as well as pleural effusions, required intubation on 10/7, became hemodynamically unstable, requiring pressor support 10/7 - 10/11.   Hepatology was consulted on 10/8 for acute, severe, hepatocellular liver injury, with intact function and cholestatic parameters, suspected likely due to ischemic injury, vs. DILI. The sudden acute rise and significant improvement (AST became half in a day), rather supported the ischemic injury.   Transaminases continued to improve and now normalized: AST 32 <--1057, ALT 54<--559. ALP has been elevated since 10/10, but improving 180-> 129, bilirubin remains normal.     - C/w monitoring of LFTs, including INR  - Avoid hepatotoxic medications when medically feasible (fluconazole and levofloxacin was d/c on 10/8)  - C/w antibiotics per ID  - Can send Hep viral panel for screening    - Cholelithiasis and distended GB on prior US 9/28, but was no evidence of acute cholecystitis, was seen by GI. Later the liver injury was mostly hepatocellular, bilirubin remained normal, and had no abdominal tenderness.   - Consider repeat RUQ US if cholestatic parameters worsen.    - On AXR 10/6 concern joey for pneumoperitoneum -patient was too unstable for follow up imaging. 10/16 AXR b/o concern for ileus mentions only non specific bowel gas pattern.    Rest of mgmt. per primary team.    Thank you for the consult. Given the normal or close to normal liver enzymes, hepatology signs off. Please, reconsult as needed.   D/w primary team.

## 2021-10-20 NOTE — PROGRESS NOTE ADULT - SUBJECTIVE AND OBJECTIVE BOX
[facility] NEPHROLOGY- PROGRESS NOTE    Patient is a 77yo Male with DM on insulin, HTN, HLD, CAD, s/p R partial 5th ray amputation 8/19/2021 p/w R foot pain and foul drainage a/w diabetic foot ulcer suspicious for osteomyelitis. s/p OR debridement today. Nephrology consulted for abrupt rise in SCr.   s/p ibrahim placement for distended bladder on 9/23 with ~400ml of urine o/p  9/27- pt with SOB; CXR with pulmonary edema- pt given Lasix 80mg IV x1. Concern for aspiration PNA  Course BP: s/p lasix 60mg IV on 10/1 & 10/2 and s/p Lasix 40mg IV x1 today 10/4. Bladder scan with 1L urine; s/p ibrahim reinserted  Transferred to ICU for acute hypoxic respiratory failure, s/p intubated on 10/7, s/p extubated 10/13  s/p Rt pig tail removal   Failed TOV on 10/18; ibrahim reinserted    Hospital Medications: Medications reviewed.  REVIEW OF SYSTEMS: Pt denies any SOB, chest pain, n/v/d, abd pain or LE edema +b/l foot pain/ buttock pain      VITALS:  T(F): 97.5 (10-20-21 @ 12:23), Max: 98.2 (10-20-21 @ 04:40)  HR: 91 (10-20-21 @ 12:23)  BP: 135/68 (10-20-21 @ 12:23)  RR: 18 (10-20-21 @ 12:23)  SpO2: 100% (10-20-21 @ 12:23)  Wt(kg): --    10-19 @ 07:01  -  10-20 @ 07:00  --------------------------------------------------------  IN: 0 mL / OUT: 2100 mL / NET: -2100 mL    10-20 @ 07:01  -  10-20 @ 16:34  --------------------------------------------------------  IN: 0 mL / OUT: 1400 mL / NET: -1400 mL          PHYSICAL EXAM:  Gen: NAD  Cards: RRR, +S1/S2, +TRINIDAD  Resp: mild rales at bases  GI: soft,   : +ibrahim  Extremities: no LE edema B/L  Derm: Rt foot wrapped     LABS:  10-20    143  |  109<H>  |  11  ----------------------------<  110<H>  3.8   |  27  |  1.00    Ca    9.2      20 Oct 2021 07:22  Phos  3.4     10-19  Mg     2.1     10-19    TPro  6.8  /  Alb  2.5<L>  /  TBili  0.6  /  DBili      /  AST  32  /  ALT  54  /  AlkPhos  129<H>  10-20    Creatinine Trend: 1.00 <--, 0.97 <--, 0.97 <--, 1.05 <--, 0.97 <--, 0.99 <--, 0.99 <--                        9.7    4.07  )-----------( 245      ( 20 Oct 2021 07:22 )             30.7     Urine Studies:

## 2021-10-20 NOTE — PROGRESS NOTE ADULT - SUBJECTIVE AND OBJECTIVE BOX
Patient is seen and examined at the bed side, is afebrile. The COVID PCR need to be tested on 10/21/21.       REVIEW OF SYSTEMS: All other review systems are negative      ALLERGIES: No Known Allergies      Vital Signs Last 24 Hrs  T(C): 36.4 (20 Oct 2021 12:23), Max: 36.8 (20 Oct 2021 04:40)  T(F): 97.5 (20 Oct 2021 12:23), Max: 98.2 (20 Oct 2021 04:40)  HR: 83 (20 Oct 2021 17:07) (72 - 91)  BP: 144/72 (20 Oct 2021 17:07) (123/92 - 144/72)  BP(mean): --  RR: 18 (20 Oct 2021 17:07) (18 - 18)  SpO2: 100% (20 Oct 2021 17:07) (98% - 100%)      PHYSICAL EXAM:  GENERAL: Not in distress  CHEST/LUNG:  Not using accessory muscles, s/p removal of Right CT   HEART: s1 and s2 present  ABDOMEN:  Mild distended   EXTREMITIES: Right foot bandage in placed   CNS: Awake and alert       LABS:                        9.7    4.07  )-----------( 245      ( 20 Oct 2021 07:22 )             30.7                           9.2    4.88  )-----------( 241      ( 19 Oct 2021 06:34 )             28.6       10-20    143  |  109<H>  |  11  ----------------------------<  110<H>  3.8   |  27  |  1.00    Ca    9.2      20 Oct 2021 07:22  Phos  3.4     10-19  Mg     2.1     10-19    TPro  6.8  /  Alb  2.5<L>  /  TBili  0.6  /  DBili  x   /  AST  32  /  ALT  54  /  AlkPhos  129<H>  10-20    10-19    140  |  106  |  15  ----------------------------<  109<H>  3.9   |  26  |  0.97    Ca    9.0      19 Oct 2021 06:34  Phos  3.4     10-19  Mg     2.1     10-19    TPro  6.8  /  Alb  2.4<L>  /  TBili  0.7  /  DBili  x   /  AST  40  /  ALT  61<H>  /  AlkPhos  133<H>  10-19               09-25    139  |  107  |  41<H>  ----------------------------<  134<H>  4.1   |  21<L>  |  4.05<H>    Ca    8.6      25 Sep 2021 06:40    TPro  6.6  /  Alb  2.3<L>  /  TBili  0.5  /  DBili  x   /  AST  25  /  ALT  15  /  AlkPhos  102  09-25        CAPILLARY BLOOD GLUCOSE  POCT Blood Glucose.: 134 mg/dL (17 Sep 2021 17:11)  POCT Blood Glucose.: 128 mg/dL (17 Sep 2021 11:06)  POCT Blood Glucose.: 157 mg/dL (17 Sep 2021 04:10)      Vancomycin Level, Trough (10.14.21 @ 11:32) : 21.8  Vancomycin Level, Trough (10.12.21 @ 12:13)   Vancomycin Level, Trough: 19.5:      MEDICATIONS  (STANDING):    apixaban 5 milliGRAM(s) Oral two times a day  aspirin  chewable 81 milliGRAM(s) Oral daily  atorvastatin 80 milliGRAM(s) Oral at bedtime  chlorhexidine 2% Cloths 1 Application(s) Topical <User Schedule>  collagenase Ointment 1 Application(s) Topical daily  cyanocobalamin 1000 MICROGram(s) Oral daily  ergocalciferol 82525 Unit(s) Oral <User Schedule>  ferrous    sulfate Liquid 300 milliGRAM(s) Enteral Tube daily  finasteride 5 milliGRAM(s) Oral daily  gabapentin 100 milliGRAM(s) Oral three times a day  insulin lispro (ADMELOG) corrective regimen sliding scale   SubCutaneous every 6 hours  metoprolol tartrate 25 milliGRAM(s) Oral two times a day  NIFEdipine XL 60 milliGRAM(s) Oral daily  pantoprazole  Injectable 40 milliGRAM(s) IV Push at bedtime  piperacillin/tazobactam IVPB.. 3.375 Gram(s) IV Intermittent every 8 hours          RADIOLOGY & ADDITIONAL TESTS:    10/5/21 CT Chest No Cont (10.05.21 @ 14:07) Pulmonary edema with bilateral moderate to large pleural effusions. Underlying bilateral lower lobe compressive atelectasis versus pneumonia.  Mildly enlarged mediastinal lymph nodes, possibly reactive.      10/2/21: Xray Chest 1 View- PORTABLE-Routine (Xray Chest 1 View- PORTABLE-Routine in AM.) (10.02.21 @ 09:46) There is persistent bilateral perihilar/basilar diffuse airspace disease and/or RIGHT effusion.  Cardiomegaly.  No interval change.    Collected Date/Time: 9/22/2021 08:42 EDT   Received Date/Time: 9/23/2021 09:29 EDT   Surgical Pathology Report - Auth (Verified)   Specimen(s) Submitted   1 Right 5th Toe Wound Debridement r/o Osteomyelitis   Final Diagnosis   Right fifth toe; wound debridement:   - Bone with acute osteomyelitis and necrotic periosteal tissue.     9/28/21: US Abdomen Upper Quadrant Right (09.28.21 @ 12:13) Cholelithiasis without evidence of acute cholecystitis. Note is made of right pleural effusion    9/27/21: CT Abdomen and Pelvis w/ Oral Cont (09.27.21 @ 15:53) >No acute intra-abdominal pathology.    Small bilateral pleural effusions with compressive atelectasis of both lower lobes.    9/26/21: Xray Chest 1 View-PORTABLE IMMEDIATE (Xray Chest 1 View-PORTABLE IMMEDIATE .) (09.26.21 @ 15:28) : Small bilateral pleural effusions and mild pulmonary edema. A right lower lobe pneumonia is not excluded.      9/26/21: Xray Abdomen 1 View Portable, IMMEDIATE (Xray Abdomen 1 View Portable, IMMEDIATE .) (09.26.21 @ 15:28) : There is a nasogastric tube with its tip in the stomach. There is gaseous distention of the colon. No pathologic calcifications are seen. The osseous structures are intact with degenerative change is present in the spine.      9/17/21 : MR Foot No Cont, Right (09.17.21 @ 13:04) : Resection at the mid aspect of the fifth metatarsal. Lateral soft tissue wound with marrow signal abnormality throughout the fifth metatarsal and within the adjacent fourth metatarsal head which is nonspecific in the setting of recent surgery although osteomyelitis is suspected.    9/16/21 : Xray Foot AP + Lateral + Oblique, Right (09.16.21 @ 15:03) : No acute finding. No plain film evidence of osteomyelitis.        MICROBIOLOGY DATA:    COVID-19 PCR (10.11.21 @ 05:44)   COVID-19 PCR: NotDetec:   Urine Microscopic-Add On (NC) (09.22.21 @ 16:14)   Red Blood Cell - Urine: 0-2 /HPF   White Blood Cell - Urine: 3-5 /HPF   Bacteria: Moderate /HPF   Comment - Urine: yeast cells present   Epithelial Cells: Moderate /HPF     Culture - Tissue with Gram Stain (09.22.21 @ 13:54)   Gram Stain: No polymorphonuclear cells seen per low power field   No organisms seen per oil power field   Specimen Source: .Tissue Right 5th toe r/o osteomyeltis   Culture Results: No growth     COVID-19 David Domain Antibody (09.18.21 @ 12:55)   COVID-19 David Domain Antibody Result: >250.00:    Culture - Blood (09.17.21 @ 10:28)   Specimen Source: .Blood Blood-Peripheral   Culture Results: No growth to date.     Culture - Blood (09.17.21 @ 10:28)   Specimen Source: .Blood Blood-Venous   Culture Results: No growth to date.     Culture - Surgical Swab (09.16.21 @ 21:42)   - Amikacin: S <=16   - Amikacin: S <=16   - Amoxicillin/Clavulanic Acid: S <=8/4   - Ampicillin: R >16 These ampicillin results predict results for amoxicillin   - Ampicillin: S <=2 Predicts results to ampicillin/sulbactam, amoxacillin-clavulanate and piperacillin-tazobactam.   - Ampicillin/Sulbactam: S 8/4 Enterobacter, Citrobacter, and Serratia may develop resistance during prolonged therapy (3-4 days)   - Ampicillin/Sulbactam: R >16/8   - Aztreonam: S <=4   - Aztreonam: S <=4   - Cefazolin: R 16 Enterobacter, Citrobacter, and Serratia may develop resistance during prolonged therapy (3-4 days)   - Cefazolin: R >16   - Cefepime: S <=2   - Cefepime: S <=2   - Cefoxitin: S <=8   - Cefoxitin: S <=8   - Ceftazidime: S <=1   - Ceftriaxone: S <=1   - Ceftriaxone: S <=1 Enterobacter, Citrobacter, and Serratia may develop resistance during prolonged therapy   - Ciprofloxacin: S <=0.25   - Ciprofloxacin: S <=0.25   - Ertapenem: S <=0.5   - Gentamicin: S <=2   - Gentamicin: S <=2   - Imipenem: S <=1   - Levofloxacin: S <=0.5   - Levofloxacin: S <=0.5   - Meropenem: S <=1   - Meropenem: S <=1   - Piperacillin/Tazobactam: S <=8   - Piperacillin/Tazobactam: S <=8   - Tetra/Doxy: S 4   - Tobramycin: S <=2   - Trimethoprim/Sulfamethoxazole: S <=0.5/9.5   - Trimethoprim/Sulfamethoxazole: S <=0.5/9.5   - Vancomycin: S 2   Specimen Source: .Surgical Swab right foot wound   Culture Results:   Culture yields >4 types of aerobic and/or anaerobic bacteria   Call client services within 7 days if further workup is clinically   indicated. Culture includes   Rare Aeromonas hydrophila/caviae   Few Klebsiella oxytoca/Raoutella ornithinolytica   Few Enterococcus faecalis   Few Streptococcus agalactiae (Group B) isolated   Group B streptococci are susceptible to ampicillin,   penicillin and cefazolin, but may be resistant to   erythromycin and clindamycin.   Recommendations for intrapartum prophylaxis for Group B   streptococci are penicillin or ampicillin.   Organism Identification: Aeromonas hydrophila/caviae   Klebsiella oxytoca /Raoutella ornithinolytica   Enterococcus faecalis   Organism: Aeromonas hydrophila/caviae   Organism: Klebsiella oxytoca /Raoutella ornithinolytica   Organism: Enterococcus faecalis   COVID-19 PCR . (09.16.21 @ 22:24)   COVID-19 PCR: NotDetec:

## 2021-10-20 NOTE — PROGRESS NOTE ADULT - SUBJECTIVE AND OBJECTIVE BOX
`Patient is a 76y old  Male who presents with a chief complaint of R Foot Pain (20 Oct 2021 10:39)    PATIENT IS SEEN AND EXAMINED IN MEDICAL FLOOR.  NGT [    ]    MADDY [   ]      GT [   ]    ALLERGIES:  No Known Allergies      Daily     Daily     VITALS:    Vital Signs Last 24 Hrs  T(C): 36.8 (20 Oct 2021 04:40), Max: 37 (19 Oct 2021 12:45)  T(F): 98.2 (20 Oct 2021 04:40), Max: 98.6 (19 Oct 2021 12:45)  HR: 86 (20 Oct 2021 04:40) (72 - 86)  BP: 143/69 (20 Oct 2021 04:40) (123/92 - 143/69)  BP(mean): --  RR: 18 (20 Oct 2021 04:40) (18 - 18)  SpO2: 98% (20 Oct 2021 04:40) (98% - 100%)    LABS:    CBC Full  -  ( 20 Oct 2021 07:22 )  WBC Count : 4.07 K/uL  RBC Count : 3.36 M/uL  Hemoglobin : 9.7 g/dL  Hematocrit : 30.7 %  Platelet Count - Automated : 245 K/uL  Mean Cell Volume : 91.4 fl  Mean Cell Hemoglobin : 28.9 pg  Mean Cell Hemoglobin Concentration : 31.6 gm/dL  Auto Neutrophil # : x  Auto Lymphocyte # : x  Auto Monocyte # : x  Auto Eosinophil # : x  Auto Basophil # : x  Auto Neutrophil % : x  Auto Lymphocyte % : x  Auto Monocyte % : x  Auto Eosinophil % : x  Auto Basophil % : x      10-20    143  |  109<H>  |  11  ----------------------------<  110<H>  3.8   |  27  |  1.00    Ca    9.2      20 Oct 2021 07:22  Phos  3.4     10-19  Mg     2.1     10-19    TPro  6.8  /  Alb  2.5<L>  /  TBili  0.6  /  DBili  x   /  AST  32  /  ALT  54  /  AlkPhos  129<H>  10-20    CAPILLARY BLOOD GLUCOSE      POCT Blood Glucose.: 93 mg/dL (20 Oct 2021 11:51)  POCT Blood Glucose.: 109 mg/dL (20 Oct 2021 06:52)  POCT Blood Glucose.: 105 mg/dL (20 Oct 2021 00:42)  POCT Blood Glucose.: 106 mg/dL (19 Oct 2021 21:36)  POCT Blood Glucose.: 115 mg/dL (19 Oct 2021 16:28)        LIVER FUNCTIONS - ( 20 Oct 2021 07:22 )  Alb: 2.5 g/dL / Pro: 6.8 g/dL / ALK PHOS: 129 U/L / ALT: 54 U/L DA / AST: 32 U/L / GGT: x           Creatinine Trend: 1.00<--, 0.97<--, 0.97<--, 1.05<--, 0.97<--, 0.99<--  I&O's Summary    19 Oct 2021 07:01  -  20 Oct 2021 07:00  --------------------------------------------------------  IN: 0 mL / OUT: 2100 mL / NET: -2100 mL            .Body Fluid Pleural Fluid  10-08 @ 18:51   No growth at 1 week.  --  --      .Body Fluid Pleural Fluid  10-08 @ 18:34   No growth at 5 days  --    polymorphonuclear leukocytes seen  No organisms seen  by cytocentrifuge      .Tissue Right 5th toe r/o osteomyeltis  09-22 @ 13:54   No growth at 5 days  --    No polymorphonuclear cells seen per low power field  No organisms seen per oil power field      .Blood Blood-Venous  09-17 @ 10:28   No Growth Final  --  --      .Surgical Swab right foot wound  09-16 @ 21:42   Culture yields >4 types of aerobic and/or anaerobic bacteria  Call client services within 7 days if further workup is clinically  indicated. Culture includes  Rare Aeromonas hydrophila/caviae  Few Klebsiella oxytoca/Raoutella ornithinolytica  Few Enterococcus faecalis  Few Streptococcus agalactiae (Group B) isolated  Group B streptococci are susceptible to ampicillin,  penicillin and cefazolin, but may be resistant to  erythromycin and clindamycin.  Recommendations for intrapartum prophylaxis for Group B  streptococci are penicillin or ampicillin.  --  Aeromonas hydrophila/caviae  Klebsiella oxytoca /Raoutella ornithinolytica  Enterococcus faecalis      Bone R 5th metatarsal bone clean ma  08-20 @ 03:59   No growth at 5 days  --    No polymorphonuclear leukocytes seen per low power field  No organisms seen per oil power field      .Blood Blood-Venous  08-14 @ 21:09   No Growth Final  --  --      .Surgical Swab Right foot plantar wound  08-14 @ 21:08   Moderate Streptococcus agalactiae (Group B) isolated  Group B streptococci are susceptible to ampicillin,  penicillin and cefazolin, but may be resistant to  erythromycin and clindamycin.  Recommendations for intrapartum prophylaxis for Group B  streptococci are penicillin or ampicillin.  Moderate Bacteroides fragilis "Susceptibilities not performed"  Normal skin filiberto isolated  --  --          MEDICATIONS:    MEDICATIONS  (STANDING):  apixaban 5 milliGRAM(s) Oral two times a day  aspirin  chewable 81 milliGRAM(s) Oral daily  atorvastatin 80 milliGRAM(s) Oral at bedtime  chlorhexidine 2% Cloths 1 Application(s) Topical <User Schedule>  collagenase Ointment 1 Application(s) Topical daily  cyanocobalamin 1000 MICROGram(s) Oral daily  ergocalciferol 89641 Unit(s) Oral <User Schedule>  ferrous    sulfate Liquid 300 milliGRAM(s) Enteral Tube daily  finasteride 5 milliGRAM(s) Oral daily  gabapentin 100 milliGRAM(s) Oral three times a day  insulin lispro (ADMELOG) corrective regimen sliding scale   SubCutaneous every 6 hours  metoprolol tartrate 25 milliGRAM(s) Oral two times a day  NIFEdipine XL 60 milliGRAM(s) Oral daily  pantoprazole  Injectable 40 milliGRAM(s) IV Push at bedtime  piperacillin/tazobactam IVPB.. 3.375 Gram(s) IV Intermittent every 8 hours      MEDICATIONS  (PRN):  acetaminophen   Tablet .. 650 milliGRAM(s) Oral every 6 hours PRN Temp greater or equal to 38C (100.4F), Moderate Pain (4 - 6)  ondansetron Injectable 4 milliGRAM(s) IV Push three times a day PRN Nausea and/or Vomiting  sodium chloride 0.9% lock flush 10 milliLiter(s) IV Push every 1 hour PRN Pre/post blood products, medications, blood draw, and to maintain line patency      REVIEW OF SYSTEMS:                           ALL ROS DONE [ X   ]    CONSTITUTIONAL:  LETHARGIC [   ], FEVER [   ], UNRESPONSIVE [   ]  CVS:  CP  [   ], SOB, [   ], PALPITATIONS [   ], DIZZYNESS [   ]  RS: COUGH [   ], SPUTUM [   ]  GI: ABDOMINAL PAIN [   ], NAUSEA [   ], VOMITINGS [   ], DIARRHEA [   ], CONSTIPATION [   ]  :  DYSURIA [   ], NOCTURIA [   ], INCREASED FREQUENCY [   ], DRIBLING [   ],  SKELETAL: PAINFUL JOINTS [   ], SWOLLEN JOINTS [   ], NECK ACHE [   ], LOW BACK ACHE [   ],  SKIN : ULCERS [   ], RASH [   ], ITCHING [   ]  CNS: HEAD ACHE [   ], DOUBLE VISION [   ], BLURRED VISION [   ], AMS / CONFUSION [   ], SEIZURES [   ], WEAKNESS [   ],TINGLING / NUMBNESS [   ]      PHYSICAL EXAMINATION:    GENERAL APPEARANCE: NO DISTRESS  HEENT:  NO PALLOR, NO  JVD,  NO   NODES, NECK SUPPLE  CVS: S1 +, S2 +,   RS: AEEB,  OCCASIONAL  RALES +,  RONCHI +, CRACKLES + [IMPROVING]   ABD: SOFT, NT, NO, BS +       EXT: NO PE  SKIN: WARM,   RIGHT FOOT WRAPPED  SKELETAL:  ROM ACCEPTABLE  CNS:  AAO X  2-3        RADIOLOGY :    < from: Transthoracic Echocardiogram (10.07.21 @ 15:26) >  CONCLUSIONS:  1. Normal mitral valve. Moderate mitral regurgitation.  2. Calcified aortic valve with decreased opening, cannot  exclude bicuspid. Peak transaortic valve gradient equals  32.4 mm Hg, mean transaortic valve gradient equals 19 mm  Hg, dimensionless index 0.22, estimated aortic valve area  equals 0.6sqcm (by continuity equation), consistent with  paradoxical low-flow low-gradient severe aortic stenosis.  Consider dobutamine stress echocardiogram and/or SABIHA for  further evaluation.  No aortic valve regurgitation seen.  3. Normal aortic root.  4. Normal left atrium.  5. Moderate concentric left ventricular hypertrophy.  6. Moderate global left ventricular systolic dysfunction  (EF 40-45% by visual estimation).  7. Grade II diastolic dysfunction (moderate).  8. Normal right atrium.  9. Normal right ventricular size with decreased RV systolic  function (TAPSE 1.2 cm).  10. RV systolic pressure is borderline normal at  34 mm Hg.  11. Tricuspid valve not well seen. Trace tricuspid  regurgitation.  12. Normal pulmonic valve. Trace pulmonic insufficiency is  noted.  13. No pericardial effusion.  14. Bilateral pleural effusions.    < end of copied text >      EXAM:  CT ABDOMEN AND PELVIS OC                            PROCEDURE DATE:  09/27/2021          INTERPRETATION:  CLINICAL INFORMATION: Small bowel obstruction.    COMPARISON: None.    CONTRAST/COMPLICATIONS:  IV Contrast: NONE  Oral Contrast: Omnipaque 300  Complications: None reported at time of study completion    PROCEDURE:  CT of the Abdomen and Pelvis was performed.  Sagittal and coronal reformats were performed.    FINDINGS:  LOWER CHEST: Small bilateral pleural effusions with compressiveatelectasis of both lower lobes.    LIVER: Within normal limits.  BILE DUCTS: Normal caliber.  GALLBLADDER: Distended. Small layering gallstones.  SPLEEN: Within normal limits.  PANCREAS: Within normal limits.  ADRENALS: Within normal limits.  KIDNEYS/URETERS: No hydronephrosis. Nonobstructing left upper pole calculus, measuring 0.6 cm.    BLADDER: Urinary bladder contains air and a Castañeda catheter balloon.  REPRODUCTIVE ORGANS: Prostate is not enlarged.    BOWEL: No bowel obstruction. Appendix isnormal. Colonic diverticulosis.  PERITONEUM: Mild presacral fluid.  VESSELS: Atherosclerotic changes.  RETROPERITONEUM/LYMPH NODES: No lymphadenopathy.  ABDOMINAL WALL: Within normal limits.  BONES: Degenerative changes. Sternotomy.    IMPRESSION:  No acute intra-abdominal pathology.    Small bilateral pleural effusions with compressive atelectasis of both lower lobes.    ====================================================    EXAM:  MR FOOT RT                            PROCEDURE DATE:  09/17/2021          INTERPRETATION:  Clinical Information: Recent fifth metatarsal head resection now with fifth digit pain, swelling and foul discharge.    Comparison: Radiographs of the right foot from 9/16/2021 and MRI the right foot from 8/16/2021.    Technique:  MRI of the right midfoot and forefoot.  Intravenous Contrast: None.    Findings:    There is a resection at the mid aspect of the fifth metatarsal shaft. There is a lateral soft tissue wound beginning at the level of the amputation which extends distally. There is susceptibility artifact in the region of the amputation consistent with postoperative change. There is hyperintense T2 marrow signal throughout the remaining fifth metatarsal and within the adjacent fourth metatarsal head which is nonspecific and could be related to recent postoperative changes although osteomyelitis is suspected.    There is edema and mild atrophy within the plantar muscles of the foot. There is minimal spurring at the first metatarsophalangeal and hallux sesamoid articulations.    Impression:  Resection at the mid aspect of the fifth metatarsal. Lateral soft tissue wound with marrow signal abnormality throughout the fifth metatarsal and within the adjacent fourth metatarsal head which is nonspecific in the setting of recent surgery although osteomyelitis is suspected.        ASSESSMENT :     Headache    HTN (hypertension)    HLD (hyperlipidemia)    DM (diabetes mellitus)    CAD (coronary artery disease)    Glaucoma, angle-closure    [place] (hard of hearing)      S/P CABG (coronary artery bypass graft)    History of thyroid surgery        PLAN:    HPI:  76M from home, lives with daughter w/ PMH DM on insulin, HTN, HLD, CAD, 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 Pt with sharp pain on the R lateral foot. As per daughter, pt was taking tylenol which did not help his pain prompting the patient to ask his daughter to take him to the hospital. Pt is a poor historian. Daughter states that the patient did not see his podiatrist after the surgery. No fever, chest, pain, palpitations, nausea, vomiting, diarrhea (17 Sep 2021 01:11)      # PATIENT IS S/P ICU MONITORING AND RIGHT CHEST TUBE REMOVAL ON 10/15/2021     # COVID EXPOSURE ON 10/13 - ON CONTACT AND DROPLET ISOLATION PRECAUTION; F/U COVID PCR    # SUSPECT RIGHT FOOT OSTEOMYELITIS S/P RIGHT 5TH RAY RESECTION [8/19] AND S/P DEBRIDEMENT, GRAFT APPLICATION, BONE BX AND WOUND VAC APPLICATION 9/22 - BONE BX W/ ACUTE OSTEOMYELITIS AND NECROTIC PERIOSTEAL TISSUE  ** RECENT ADMISSION FOR RIGHT DIABETIC FOOT ULCER, CELLULITIS, OSTEOMYELITIS RIGHT 5th METATARSAL S/P RIGHT 5TH PARTIAL RAY AMPUTATION [8/20] - BONE PATHOLOGY W/ CLEAN MARGINS    - S/P VANCOMYCIN AND ZOSYN ; NOW ON UNASYN AND LEVAQUIN GIVEN OREN; PER ID SWITCH TO ZOSYN UNTIL 11/3 [NOW ON MEROPENEM]  - RECENTLY HAD SIMON W/ MILD PAD  - REVIEWED WOUND CX [ ENTEROCOCCUS, AEROMONAS, KLEBSIELLA] AND BCX  - ID CONSULT, PODIATRY CONSULT    - PICC LINE PLACED TO COMPLETE 6 WEEKS OF ANTIBIOTICS - ON MEROPENEM UNTIL 11/3 ON D/C    # ACUTE HYPOXIC RESPIRATORY FAILURE DUE TO ACUTE ON CHRONIC SYSTOLIC CHF  (  LV EF 40- 45 % ) , DIASTOLIC LV DYSFUNCTION , ,  SEVERE AORTIC STENOSIS & MODERATE MITRAL REGURGITATION  &  ASPIRATION PNA;  - S/P CHEST TUBE INSERTION AND REMOVAL  , S/P LASIX ,   CARDIOLOGY CONSULT IN PROGRESS      - CHEST TUBE PLACED [10/8] - FLUID CONSISTENT WITH TRANSUDATIVE PROCESS  - S/P EXTUBATION 10/13  - S/P CHEST TUBE REMOVAL 10/15      # SEPTIC SHOCK - ? S/T HYPOVOLEMIA VS. SEPSIS - S/P CVC [SWITCHED FROM FEMORAL TO LEFT IJ], S/P VASOPRESSORS - RESOLVING  - BCX - NGTD  - ON MEROPENEM      # TRANSIENT NEW ONSET A.FIB, PAROXYSMAL - ON ELIQUIS, CARDIOLOGY CONSULT IN PROGRESS    # FUNGURIA - D/C FLUCONAZOLE GIVEN TRANSAMINITIS    # TRANSAMINITIS - SUSPECT THIS IS ISCHEMIC HEPATITIS - IMPROVING - HEPATOLOGY CONSULT IN PROGRESS    # BOWEL DISTENTION - ? ILEUS - OPTIMIZING ELECTROLYTES - IMPROVED  - SURGERY CONSULT IN PROGRESS    # CHOLELITHIASIS - TRENDING LFTS, RUQ U/S - CHOLELITHIASIS, SURGERY CONSULT IN PROGRESS  - GI CONSULT IN PROGRESS - LESS SUSPICIOUS FOR CHOLECYSTITIS    # DYSPEPSIA - PLACED ON PPI BID WITH IMPROVEMENT, UNDERWENT ST EVAL     # ELEVATED TROPONINS - NSTEMI - ? DEMAND - WILL NEED ISCHEMIC EVAL ONCE ACUTE INFECTION RESOLVES PER CARDIOLOGY, CARDIOLOGY CONSULT IN PROGRESS  - ON ASA, STATIN, BB    # OREN ON CKD - S/T ATN AND URINARY RETENTION - S/P IVF, NOW W/ CASTAÑEDA, NEPHROLOGY CONUSLT IN PROGRESS  -  BPH ON FLOMAX, AND FINASTERIDE  - FAILED TOV WITH WORSENING RENAL FXN - NOTED URINARY RETENTION [10/4] - REPLACED CASTAÑEDA  - TOV 10/18 - BLADDER SCAN BID TO EVALUATE FOR URINARY RETENTION - FAILED TOV  - OVERNIGHT 10/18 - CASTAÑEDA REPLACED    # MEDICAL CLEARANCE FOR SURGERY - RCRI - 2 - 10.1 % 30 DAY RISK OF DEATH, MI OR CARDIAC ARREST  - CARDIOLOGY CONSULT     # DM -  HBA1C - 11  - LANTUS, SSI + FS    # CAD S/P CABG - PLACED ON ASA, STATIN, BB  - ECHO - SEVERE AORTIC STENOSIS, SEVERE CONCENTRIC LVH, G1DD, MILD AK  - CARDIOLOGY CONSULT - DR. BASSETT   - MAY NEED ISCHEMIC EVAL ONCE ACUTE ILLNESS RESOLVES INCLUDING CARDIAC CATHETERIZATION    # HTN, HLD  - ON METOPROLOL, NIFEDIPINE, STATIN       #  IMPAIRED GAIT DUE TO CERVICAL & LS SPONDYLOSIS, POLY ARTHRITIS AND DIABETIC PERIPHERAL NEUROPATHY, OP VITAMIN D DEFICIENCY - ON CHOLECALCIFEROL, OBTAIN PT & OT   #  FAILURE TO THRIVE , MODERATE PROTEIN CALORIE MALNUTRITION       # CASE DISCUSSED AT LENGTH WITH PATIENT AND DAUGHTER, BIRDIE ROBERT AT BEDSIDE AND VIA PHONE @ [phone] [10/5] - CASE DICUSSED AT LENGTH AND ALL QUESTIONS WERE ANSWERED. DAUGHTER UNDERSTOOD THAT PROGNOSIS IS GUARDED.  # PATIENT AND DAUGHTER REFUSED [facility] ON PREVIOUS ADMISSION, PREVIOUSLY WISHED FOR PATIENT RETURN HOME W/ HOME PT; HOWEVER NOW, DAUGHTER WISHES FOR PATIENT TO GO TO [facility], AS PATIENT'S WIFE IS CURRENTLY ADMITTED THERE    # GI AND DVT PPX   Patient is a 76y old  Male who presents with a chief complaint of R Foot Pain (20 Oct 2021 10:39)    PATIENT IS SEEN AND EXAMINED IN MEDICAL FLOOR.    ALLERGIES:  No Known Allergies      VITALS:    Vital Signs Last 24 Hrs  T(C): 36.8 (20 Oct 2021 04:40), Max: 37 (19 Oct 2021 12:45)  T(F): 98.2 (20 Oct 2021 04:40), Max: 98.6 (19 Oct 2021 12:45)  HR: 86 (20 Oct 2021 04:40) (72 - 86)  BP: 143/69 (20 Oct 2021 04:40) (123/92 - 143/69)  BP(mean): --  RR: 18 (20 Oct 2021 04:40) (18 - 18)  SpO2: 98% (20 Oct 2021 04:40) (98% - 100%)    LABS:    CBC Full  -  ( 20 Oct 2021 07:22 )  WBC Count : 4.07 K/uL  RBC Count : 3.36 M/uL  Hemoglobin : 9.7 g/dL  Hematocrit : 30.7 %  Platelet Count - Automated : 245 K/uL  Mean Cell Volume : 91.4 fl  Mean Cell Hemoglobin : 28.9 pg  Mean Cell Hemoglobin Concentration : 31.6 gm/dL  Auto Neutrophil # : x  Auto Lymphocyte # : x  Auto Monocyte # : x  Auto Eosinophil # : x  Auto Basophil # : x  Auto Neutrophil % : x  Auto Lymphocyte % : x  Auto Monocyte % : x  Auto Eosinophil % : x  Auto Basophil % : x      10-20    143  |  109<H>  |  11  ----------------------------<  110<H>  3.8   |  27  |  1.00    Ca    9.2      20 Oct 2021 07:22  Phos  3.4     10-19  Mg     2.1     10-19    TPro  6.8  /  Alb  2.5<L>  /  TBili  0.6  /  DBili  x   /  AST  32  /  ALT  54  /  AlkPhos  129<H>  10-20    CAPILLARY BLOOD GLUCOSE      POCT Blood Glucose.: 93 mg/dL (20 Oct 2021 11:51)  POCT Blood Glucose.: 109 mg/dL (20 Oct 2021 06:52)  POCT Blood Glucose.: 105 mg/dL (20 Oct 2021 00:42)  POCT Blood Glucose.: 106 mg/dL (19 Oct 2021 21:36)  POCT Blood Glucose.: 115 mg/dL (19 Oct 2021 16:28)        LIVER FUNCTIONS - ( 20 Oct 2021 07:22 )  Alb: 2.5 g/dL / Pro: 6.8 g/dL / ALK PHOS: 129 U/L / ALT: 54 U/L DA / AST: 32 U/L / GGT: x           Creatinine Trend: 1.00<--, 0.97<--, 0.97<--, 1.05<--, 0.97<--, 0.99<--  I&O's Summary    19 Oct 2021 07:01  -  20 Oct 2021 07:00  --------------------------------------------------------  IN: 0 mL / OUT: 2100 mL / NET: -2100 mL      .Body Fluid Pleural Fluid  10-08 @ 18:51   No growth at 1 week.  --  --      .Body Fluid Pleural Fluid  10-08 @ 18:34   No growth at 5 days  --    polymorphonuclear leukocytes seen  No organisms seen  by cytocentrifuge      .Tissue Right 5th toe r/o osteomyeltis  09-22 @ 13:54   No growth at 5 days  --    No polymorphonuclear cells seen per low power field  No organisms seen per oil power field      .Blood Blood-Venous  09-17 @ 10:28   No Growth Final  --  --      .Surgical Swab right foot wound  09-16 @ 21:42   Culture yields >4 types of aerobic and/or anaerobic bacteria  Call client services within 7 days if further workup is clinically  indicated. Culture includes  Rare Aeromonas hydrophila/caviae  Few Klebsiella oxytoca/Raoutella ornithinolytica  Few Enterococcus faecalis  Few Streptococcus agalactiae (Group B) isolated  Group B streptococci are susceptible to ampicillin,  penicillin and cefazolin, but may be resistant to  erythromycin and clindamycin.  Recommendations for intrapartum prophylaxis for Group B  streptococci are penicillin or ampicillin.  --  Aeromonas hydrophila/caviae  Klebsiella oxytoca /Raoutella ornithinolytica  Enterococcus faecalis      Bone R 5th metatarsal bone clean ma  08-20 @ 03:59   No growth at 5 days  --    No polymorphonuclear leukocytes seen per low power field  No organisms seen per oil power field      .Blood Blood-Venous  08-14 @ 21:09   No Growth Final  --  --      .Surgical Swab Right foot plantar wound  08-14 @ 21:08   Moderate Streptococcus agalactiae (Group B) isolated  Group B streptococci are susceptible to ampicillin,  penicillin and cefazolin, but may be resistant to  erythromycin and clindamycin.  Recommendations for intrapartum prophylaxis for Group B  streptococci are penicillin or ampicillin.  Moderate Bacteroides fragilis "Susceptibilities not performed"  Normal skin filiberto isolated  --  --          MEDICATIONS:    MEDICATIONS  (STANDING):  apixaban 5 milliGRAM(s) Oral two times a day  aspirin  chewable 81 milliGRAM(s) Oral daily  atorvastatin 80 milliGRAM(s) Oral at bedtime  chlorhexidine 2% Cloths 1 Application(s) Topical <User Schedule>  collagenase Ointment 1 Application(s) Topical daily  cyanocobalamin 1000 MICROGram(s) Oral daily  ergocalciferol 65552 Unit(s) Oral <User Schedule>  ferrous    sulfate Liquid 300 milliGRAM(s) Enteral Tube daily  finasteride 5 milliGRAM(s) Oral daily  gabapentin 100 milliGRAM(s) Oral three times a day  insulin lispro (ADMELOG) corrective regimen sliding scale   SubCutaneous every 6 hours  metoprolol tartrate 25 milliGRAM(s) Oral two times a day  NIFEdipine XL 60 milliGRAM(s) Oral daily  pantoprazole  Injectable 40 milliGRAM(s) IV Push at bedtime  piperacillin/tazobactam IVPB.. 3.375 Gram(s) IV Intermittent every 8 hours      MEDICATIONS  (PRN):  acetaminophen   Tablet .. 650 milliGRAM(s) Oral every 6 hours PRN Temp greater or equal to 38C (100.4F), Moderate Pain (4 - 6)  ondansetron Injectable 4 milliGRAM(s) IV Push three times a day PRN Nausea and/or Vomiting  sodium chloride 0.9% lock flush 10 milliLiter(s) IV Push every 1 hour PRN Pre/post blood products, medications, blood draw, and to maintain line patency      REVIEW OF SYSTEMS:                           ALL ROS DONE [ X   ]    CONSTITUTIONAL:  LETHARGIC [   ], FEVER [   ], UNRESPONSIVE [   ]  CVS:  CP  [   ], SOB, [   ], PALPITATIONS [   ], DIZZYNESS [   ]  RS: COUGH [   ], SPUTUM [   ]  GI: ABDOMINAL PAIN [   ], NAUSEA [   ], VOMITINGS [   ], DIARRHEA [   ], CONSTIPATION [   ]  :  DYSURIA [   ], NOCTURIA [   ], INCREASED FREQUENCY [   ], DRIBLING [   ],  SKELETAL: PAINFUL JOINTS [   ], SWOLLEN JOINTS [   ], NECK ACHE [   ], LOW BACK ACHE [   ],  SKIN : ULCERS [   ], RASH [   ], ITCHING [   ]  CNS: HEAD ACHE [   ], DOUBLE VISION [   ], BLURRED VISION [   ], AMS / CONFUSION [   ], SEIZURES [   ], WEAKNESS [   ],TINGLING / NUMBNESS [   ]      PHYSICAL EXAMINATION:    GENERAL APPEARANCE: NO DISTRESS  HEENT:  NO PALLOR, NO  JVD,  NO   NODES, NECK SUPPLE  CVS: S1 +, S2 +,   RS: AEEB,  OCCASIONAL  RALES +,  RONCHI +, CRACKLES + [IMPROVING]   ABD: SOFT, NT, NO, BS +       EXT: NO PE  SKIN: WARM,   RIGHT FOOT WRAPPED  SKELETAL:  ROM ACCEPTABLE  CNS:  AAO X  2-3        RADIOLOGY :    < from: Transthoracic Echocardiogram (10.07.21 @ 15:26) >  CONCLUSIONS:  1. Normal mitral valve. Moderate mitral regurgitation.  2. Calcified aortic valve with decreased opening, cannot  exclude bicuspid. Peak transaortic valve gradient equals  32.4 mm Hg, mean transaortic valve gradient equals 19 mm  Hg, dimensionless index 0.22, estimated aortic valve area  equals 0.6sqcm (by continuity equation), consistent with  paradoxical low-flow low-gradient severe aortic stenosis.  Consider dobutamine stress echocardiogram and/or SABIHA for  further evaluation.  No aortic valve regurgitation seen.  3. Normal aortic root.  4. Normal left atrium.  5. Moderate concentric left ventricular hypertrophy.  6. Moderate global left ventricular systolic dysfunction  (EF 40-45% by visual estimation).  7. Grade II diastolic dysfunction (moderate).  8. Normal right atrium.  9. Normal right ventricular size with decreased RV systolic  function (TAPSE 1.2 cm).  10. RV systolic pressure is borderline normal at  34 mm Hg.  11. Tricuspid valve not well seen. Trace tricuspid  regurgitation.  12. Normal pulmonic valve. Trace pulmonic insufficiency is  noted.  13. No pericardial effusion.  14. Bilateral pleural effusions.    < end of copied text >      EXAM:  CT ABDOMEN AND PELVIS OC                            PROCEDURE DATE:  09/27/2021          INTERPRETATION:  CLINICAL INFORMATION: Small bowel obstruction.    COMPARISON: None.    CONTRAST/COMPLICATIONS:  IV Contrast: NONE  Oral Contrast: Omnipaque 300  Complications: None reported at time of study completion    PROCEDURE:  CT of the Abdomen and Pelvis was performed.  Sagittal and coronal reformats were performed.    FINDINGS:  LOWER CHEST: Small bilateral pleural effusions with compressiveatelectasis of both lower lobes.    LIVER: Within normal limits.  BILE DUCTS: Normal caliber.  GALLBLADDER: Distended. Small layering gallstones.  SPLEEN: Within normal limits.  PANCREAS: Within normal limits.  ADRENALS: Within normal limits.  KIDNEYS/URETERS: No hydronephrosis. Nonobstructing left upper pole calculus, measuring 0.6 cm.    BLADDER: Urinary bladder contains air and a Castañeda catheter balloon.  REPRODUCTIVE ORGANS: Prostate is not enlarged.    BOWEL: No bowel obstruction. Appendix isnormal. Colonic diverticulosis.  PERITONEUM: Mild presacral fluid.  VESSELS: Atherosclerotic changes.  RETROPERITONEUM/LYMPH NODES: No lymphadenopathy.  ABDOMINAL WALL: Within normal limits.  BONES: Degenerative changes. Sternotomy.    IMPRESSION:  No acute intra-abdominal pathology.    Small bilateral pleural effusions with compressive atelectasis of both lower lobes.    ====================================================    EXAM:  MR FOOT RT                            PROCEDURE DATE:  09/17/2021          INTERPRETATION:  Clinical Information: Recent fifth metatarsal head resection now with fifth digit pain, swelling and foul discharge.    Comparison: Radiographs of the right foot from 9/16/2021 and MRI the right foot from 8/16/2021.    Technique:  MRI of the right midfoot and forefoot.  Intravenous Contrast: None.    Findings:    There is a resection at the mid aspect of the fifth metatarsal shaft. There is a lateral soft tissue wound beginning at the level of the amputation which extends distally. There is susceptibility artifact in the region of the amputation consistent with postoperative change. There is hyperintense T2 marrow signal throughout the remaining fifth metatarsal and within the adjacent fourth metatarsal head which is nonspecific and could be related to recent postoperative changes although osteomyelitis is suspected.    There is edema and mild atrophy within the plantar muscles of the foot. There is minimal spurring at the first metatarsophalangeal and hallux sesamoid articulations.    Impression:  Resection at the mid aspect of the fifth metatarsal. Lateral soft tissue wound with marrow signal abnormality throughout the fifth metatarsal and within the adjacent fourth metatarsal head which is nonspecific in the setting of recent surgery although osteomyelitis is suspected.        ASSESSMENT :     Headache    HTN (hypertension)    HLD (hyperlipidemia)    DM (diabetes mellitus)    CAD (coronary artery disease)    Glaucoma, angle-closure    [place] (hard of hearing)      S/P CABG (coronary artery bypass graft)    History of thyroid surgery        PLAN:    HPI:  76M from home, lives with daughter w/ PMH DM on insulin, HTN, HLD, CAD, 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 Pt with sharp pain on the R lateral foot. As per daughter, pt was taking tylenol which did not help his pain prompting the patient to ask his daughter to take him to the hospital. Pt is a poor historian. Daughter states that the patient did not see his podiatrist after the surgery. No fever, chest, pain, palpitations, nausea, vomiting, diarrhea (17 Sep 2021 01:11)      # PATIENT IS S/P ICU MONITORING AND RIGHT CHEST TUBE REMOVAL ON 10/15/2021     # COVID EXPOSURE ON 10/13 - ON CONTACT AND DROPLET ISOLATION PRECAUTION; F/U COVID PCR    # SUSPECT RIGHT FOOT OSTEOMYELITIS S/P RIGHT 5TH RAY RESECTION [8/19] AND S/P DEBRIDEMENT, GRAFT APPLICATION, BONE BX AND WOUND VAC APPLICATION 9/22 - BONE BX W/ ACUTE OSTEOMYELITIS AND NECROTIC PERIOSTEAL TISSUE  ** RECENT ADMISSION FOR RIGHT DIABETIC FOOT ULCER, CELLULITIS, OSTEOMYELITIS RIGHT 5th METATARSAL S/P RIGHT 5TH PARTIAL RAY AMPUTATION [8/20] - BONE PATHOLOGY W/ CLEAN MARGINS    - S/P VANCOMYCIN AND ZOSYN ; NOW ON UNASYN AND LEVAQUIN GIVEN OREN; PER ID SWITCH TO ZOSYN UNTIL 11/3 [NOW ON MEROPENEM]  - RECENTLY HAD SIMON W/ MILD PAD  - REVIEWED WOUND CX [ ENTEROCOCCUS, AEROMONAS, KLEBSIELLA] AND BCX  - ID CONSULT, PODIATRY CONSULT    - PICC LINE PLACED TO COMPLETE 6 WEEKS OF ANTIBIOTICS - ON MEROPENEM UNTIL 11/3 ON D/C    # ACUTE HYPOXIC RESPIRATORY FAILURE DUE TO ACUTE ON CHRONIC SYSTOLIC CHF  (  LV EF 40- 45 % ) , DIASTOLIC LV DYSFUNCTION , ,  SEVERE AORTIC STENOSIS & MODERATE MITRAL REGURGITATION  &  ASPIRATION PNA;  - S/P CHEST TUBE INSERTION AND REMOVAL  , S/P LASIX ,   CARDIOLOGY CONSULT IN PROGRESS      - CHEST TUBE PLACED [10/8] - FLUID CONSISTENT WITH TRANSUDATIVE PROCESS  - S/P EXTUBATION 10/13  - S/P CHEST TUBE REMOVAL 10/15      # SEPTIC SHOCK - ? S/T HYPOVOLEMIA VS. SEPSIS - S/P CVC [SWITCHED FROM FEMORAL TO LEFT IJ], S/P VASOPRESSORS - RESOLVING  - BCX - NGTD  - ON MEROPENEM      # TRANSIENT NEW ONSET A.FIB, PAROXYSMAL - ON ELIQUIS, CARDIOLOGY CONSULT IN PROGRESS    # FUNGURIA - D/C FLUCONAZOLE GIVEN TRANSAMINITIS    # TRANSAMINITIS - SUSPECT THIS IS ISCHEMIC HEPATITIS - IMPROVING - HEPATOLOGY CONSULT IN PROGRESS    # BOWEL DISTENTION - ? ILEUS - OPTIMIZING ELECTROLYTES - IMPROVED  - SURGERY CONSULT IN PROGRESS    # CHOLELITHIASIS - TRENDING LFTS, RUQ U/S - CHOLELITHIASIS, SURGERY CONSULT IN PROGRESS  - GI CONSULT IN PROGRESS - LESS SUSPICIOUS FOR CHOLECYSTITIS    # DYSPEPSIA - PLACED ON PPI BID WITH IMPROVEMENT, UNDERWENT ST EVAL     # ELEVATED TROPONINS - NSTEMI - ? DEMAND - WILL NEED ISCHEMIC EVAL ONCE ACUTE INFECTION RESOLVES PER CARDIOLOGY, CARDIOLOGY CONSULT IN PROGRESS  - ON ASA, STATIN, BB    # OREN ON CKD - S/T ATN AND URINARY RETENTION - S/P IVF, NOW W/ CASTAÑEDA, NEPHROLOGY CONUSLT IN PROGRESS  -  BPH ON FLOMAX, AND FINASTERIDE  - FAILED TOV WITH WORSENING RENAL FXN - NOTED URINARY RETENTION [10/4] - REPLACED CASTAÑEDA  - TOV 10/18 - BLADDER SCAN BID TO EVALUATE FOR URINARY RETENTION - FAILED TOV  - OVERNIGHT 10/18 - CASTAÑEDA REPLACED    # MEDICAL CLEARANCE FOR SURGERY - RCRI - 2 - 10.1 % 30 DAY RISK OF DEATH, MI OR CARDIAC ARREST  - CARDIOLOGY CONSULT     # DM -  HBA1C - 11  - LANTUS, SSI + FS    # CAD S/P CABG - PLACED ON ASA, STATIN, BB  - ECHO - SEVERE AORTIC STENOSIS, SEVERE CONCENTRIC LVH, G1DD, MILD RI  - CARDIOLOGY CONSULT - DR. BASSETT   - MAY NEED ISCHEMIC EVAL ONCE ACUTE ILLNESS RESOLVES INCLUDING CARDIAC CATHETERIZATION    # HTN, HLD  - ON METOPROLOL, NIFEDIPINE, STATIN       #  IMPAIRED GAIT DUE TO CERVICAL & LS SPONDYLOSIS, POLY ARTHRITIS AND DIABETIC PERIPHERAL NEUROPATHY, OP VITAMIN D DEFICIENCY - ON CHOLECALCIFEROL, OBTAIN PT & OT   #  FAILURE TO THRIVE , MODERATE PROTEIN CALORIE MALNUTRITION       # CASE DISCUSSED AT LENGTH WITH PATIENT AND DAUGHTER, BIRDIE ROBERT AT BEDSIDE AND VIA PHONE @ [phone] [10/5] - CASE DICUSSED AT LENGTH AND ALL QUESTIONS WERE ANSWERED. DAUGHTER UNDERSTOOD THAT PROGNOSIS IS GUARDED.  # PATIENT AND DAUGHTER REFUSED [facility] ON PREVIOUS ADMISSION, PREVIOUSLY WISHED FOR PATIENT RETURN HOME W/ HOME PT; HOWEVER NOW, DAUGHTER WISHES FOR PATIENT TO GO TO [facility], AS PATIENT'S WIFE IS CURRENTLY ADMITTED THERE    # GI AND DVT PPX

## 2021-10-20 NOTE — PROGRESS NOTE ADULT - SUBJECTIVE AND OBJECTIVE BOX
NP Note discussed with  Primary Attending    Patient is a 76y old  Male who presents with a chief complaint of R Foot Pain (20 Oct 2021 12:17)      INTERVAL HPI/OVERNIGHT EVENTS: Patient seen and examined at bedside, no new complaints    MEDICATIONS  (STANDING):  apixaban 5 milliGRAM(s) Oral two times a day  aspirin  chewable 81 milliGRAM(s) Oral daily  atorvastatin 80 milliGRAM(s) Oral at bedtime  chlorhexidine 2% Cloths 1 Application(s) Topical <User Schedule>  collagenase Ointment 1 Application(s) Topical daily  cyanocobalamin 1000 MICROGram(s) Oral daily  ergocalciferol 59041 Unit(s) Oral <User Schedule>  ferrous    sulfate Liquid 300 milliGRAM(s) Enteral Tube daily  finasteride 5 milliGRAM(s) Oral daily  gabapentin 100 milliGRAM(s) Oral three times a day  insulin lispro (ADMELOG) corrective regimen sliding scale   SubCutaneous every 6 hours  metoprolol tartrate 25 milliGRAM(s) Oral two times a day  NIFEdipine XL 60 milliGRAM(s) Oral daily  pantoprazole  Injectable 40 milliGRAM(s) IV Push at bedtime  piperacillin/tazobactam IVPB.. 3.375 Gram(s) IV Intermittent every 8 hours    MEDICATIONS  (PRN):  acetaminophen   Tablet .. 650 milliGRAM(s) Oral every 6 hours PRN Temp greater or equal to 38C (100.4F), Moderate Pain (4 - 6)  ondansetron Injectable 4 milliGRAM(s) IV Push three times a day PRN Nausea and/or Vomiting  sodium chloride 0.9% lock flush 10 milliLiter(s) IV Push every 1 hour PRN Pre/post blood products, medications, blood draw, and to maintain line patency      __________________________________________________  REVIEW OF SYSTEMS:    CONSTITUTIONAL: No fever,   EYES: no acute visual disturbances  NECK: No pain or stiffness  RESPIRATORY: No cough; No shortness of breath  CARDIOVASCULAR: No chest pain, no palpitations  GASTROINTESTINAL: No pain. No nausea or vomiting; No diarrhea   NEUROLOGICAL: No headache or numbness, no tremors  MUSCULOSKELETAL: No joint pain, no muscle pain  GENITOURINARY: no dysuria, no frequency, no hesitancy  PSYCHIATRY: no depression , no anxiety  ALL OTHER  ROS negative        Vital Signs Last 24 Hrs  T(C): 36.4 (20 Oct 2021 12:23), Max: 36.8 (20 Oct 2021 04:40)  T(F): 97.5 (20 Oct 2021 12:23), Max: 98.2 (20 Oct 2021 04:40)  HR: 91 (20 Oct 2021 12:23) (72 - 91)  BP: 135/68 (20 Oct 2021 12:23) (123/92 - 143/69)  BP(mean): --  RR: 18 (20 Oct 2021 12:23) (18 - 18)  SpO2: 100% (20 Oct 2021 12:23) (98% - 100%)    ________________________________________________  PHYSICAL EXAM:  GENERAL: NAD  HEENT: Normocephalic;  conjunctivae and sclerae clear; moist mucous membranes;   NECK : supple  CHEST/LUNG: Clear to auscultation bilaterally with good air entry   HEART: S1 S2  regular; no murmurs, gallops or rubs  ABDOMEN: Soft, Nontender, Nondistended; Bowel sounds present  EXTREMITIES: right foot ace bandage, no cyanosis; no edema; no calf tenderness  SKIN: right foot ulceration, perianal wound  NERVOUS SYSTEM:  Awake and alert; Oriented  to person    _________________________________________________  LABS:                        9.7    4.07  )-----------( 245      ( 20 Oct 2021 07:22 )             30.7     10-20    143  |  109<H>  |  11  ----------------------------<  110<H>  3.8   |  27  |  1.00    Ca    9.2      20 Oct 2021 07:22  Phos  3.4     10-19  Mg     2.1     10-19    TPro  6.8  /  Alb  2.5<L>  /  TBili  0.6  /  DBili  x   /  AST  32  /  ALT  54  /  AlkPhos  129<H>  10-20    CAPILLARY BLOOD GLUCOSE    POCT Blood Glucose.: 93 mg/dL (20 Oct 2021 11:51)  POCT Blood Glucose.: 109 mg/dL (20 Oct 2021 06:52)  POCT Blood Glucose.: 105 mg/dL (20 Oct 2021 00:42)  POCT Blood Glucose.: 106 mg/dL (19 Oct 2021 21:36)  POCT Blood Glucose.: 115 mg/dL (19 Oct 2021 16:28)    RADIOLOGY & ADDITIONAL TESTS:  < from: Xray Abdomen 1 View PORTABLE -Urgent (Xray Abdomen 1 View PORTABLE -Urgent .) (10.16.21 @ 20:25) >    EXAM:  XR ABDOMEN PORTABLE URGENT 1V                            PROCEDURE DATE:  10/16/2021          INTERPRETATION:  Suspect ileus.    AP portable supine. Prior 10/6/2021.    IMPRESSION:  Nonspecific bowel gas pattern. No definite obstruction. No opaque urinary biliary calculi. Vascular calcification. Advanced degenerative change thoracolumbar spine.    < end of copied text >  < from: Xray Chest 1 View- PORTABLE-Urgent (Xray Chest 1 View- PORTABLE-Urgent .) (10.15.21 @ 11:43) >    EXAM:  XR CHEST PORTABLE URGENT 1V                            PROCEDURE DATE:  10/15/2021          INTERPRETATION:  Status post pigtail catheter removal.    AP chest. Prior earlier same day.    IMPRESSION: Right chest pigtail catheter has been removed. There is no discernible pneumothorax or other interval change in the exam.    < end of copied text >    Imaging  Reviewed:  YES    Consultant(s) Notes Reviewed:   YES      Plan of care was discussed with patient and /or primary care giver; all questions and concerns were addressed

## 2021-10-20 NOTE — PROGRESS NOTE ADULT - SUBJECTIVE AND OBJECTIVE BOX
Chief Complaint:  Patient is a 76y old  Male who presents with a chief complaint of R Foot Pain (20 Oct 2021 13:23)      Reason for consult:    Interval Events:     Hospital Medications:  acetaminophen   Tablet .. 650 milliGRAM(s) Oral every 6 hours PRN  apixaban 5 milliGRAM(s) Oral two times a day  aspirin  chewable 81 milliGRAM(s) Oral daily  atorvastatin 80 milliGRAM(s) Oral at bedtime  chlorhexidine 2% Cloths 1 Application(s) Topical <User Schedule>  collagenase Ointment 1 Application(s) Topical daily  cyanocobalamin 1000 MICROGram(s) Oral daily  ergocalciferol 54590 Unit(s) Oral <User Schedule>  ferrous    sulfate Liquid 300 milliGRAM(s) Enteral Tube daily  finasteride 5 milliGRAM(s) Oral daily  gabapentin 100 milliGRAM(s) Oral three times a day  insulin lispro (ADMELOG) corrective regimen sliding scale   SubCutaneous every 6 hours  metoprolol tartrate 25 milliGRAM(s) Oral two times a day  NIFEdipine XL 60 milliGRAM(s) Oral daily  ondansetron Injectable 4 milliGRAM(s) IV Push three times a day PRN  pantoprazole  Injectable 40 milliGRAM(s) IV Push at bedtime  piperacillin/tazobactam IVPB.. 3.375 Gram(s) IV Intermittent every 8 hours  sodium chloride 0.9% lock flush 10 milliLiter(s) IV Push every 1 hour PRN      ROS:   General:  No  fevers, chills, night sweats, fatigue  Eyes:  Good vision, no reported pain  ENT:  No sore throat, pain, runny nose  CV:  No pain, palpitations  Pulm:  No dyspnea, cough  GI:  See HPI, otherwise negative  :  No  incontinence, nocturia  Muscle:  No pain, weakness  Neuro:  No memory problems  Psych:  No insomnia, mood problems, depression  Endocrine:  No polyuria, polydipsia, cold/heat intolerance  Heme:  No petechiae, ecchymosis, easy bruisability  Skin:  No rash    PHYSICAL EXAM:   Vital Signs:  Vital Signs Last 24 Hrs  T(C): 36.4 (20 Oct 2021 12:23), Max: 36.8 (20 Oct 2021 04:40)  T(F): 97.5 (20 Oct 2021 12:23), Max: 98.2 (20 Oct 2021 04:40)  HR: 91 (20 Oct 2021 12:23) (72 - 91)  BP: 135/68 (20 Oct 2021 12:23) (123/92 - 143/69)  BP(mean): --  RR: 18 (20 Oct 2021 12:23) (18 - 18)  SpO2: 100% (20 Oct 2021 12:23) (98% - 100%)  Daily     Daily     GENERAL: no acute distress  NEURO: alert, no asterixis  HEENT: anicteric sclera, no conjunctival pallor appreciated  CHEST: no respiratory distress, no accessory muscle use  CARDIAC: regular rate, rhythm  ABDOMEN: soft, non-tender, non-distended, no rebound or guarding  EXTREMITIES: warm, well perfused, no edema  SKIN: no lesions noted    LABS: reviewed                        9.7    4.07  )-----------( 245      ( 20 Oct 2021 07:22 )             30.7     10-20    143  |  109<H>  |  11  ----------------------------<  110<H>  3.8   |  27  |  1.00    Ca    9.2      20 Oct 2021 07:22  Phos  3.4     10-19  Mg     2.1     10-19    TPro  6.8  /  Alb  2.5<L>  /  TBili  0.6  /  DBili  x   /  AST  32  /  ALT  54  /  AlkPhos  129<H>  10-20    LIVER FUNCTIONS - ( 20 Oct 2021 07:22 )  Alb: 2.5 g/dL / Pro: 6.8 g/dL / ALK PHOS: 129 U/L / ALT: 54 U/L DA / AST: 32 U/L / GGT: x             Interval Diagnostic Studies: see sunrise for full report   Chief Complaint:  Patient is a 76y old  Male who presents with a chief complaint of R Foot Pain (20 Oct 2021 13:23)      Reason for consult: Abnormal liver enzymes    Interval Events: Patient was seen and examined at bedside. Liver enzymes normalized or close to normal.     Hospital Medications:  acetaminophen   Tablet .. 650 milliGRAM(s) Oral every 6 hours PRN  apixaban 5 milliGRAM(s) Oral two times a day  aspirin  chewable 81 milliGRAM(s) Oral daily  atorvastatin 80 milliGRAM(s) Oral at bedtime  chlorhexidine 2% Cloths 1 Application(s) Topical <User Schedule>  collagenase Ointment 1 Application(s) Topical daily  cyanocobalamin 1000 MICROGram(s) Oral daily  ergocalciferol 73642 Unit(s) Oral <User Schedule>  ferrous    sulfate Liquid 300 milliGRAM(s) Enteral Tube daily  finasteride 5 milliGRAM(s) Oral daily  gabapentin 100 milliGRAM(s) Oral three times a day  insulin lispro (ADMELOG) corrective regimen sliding scale   SubCutaneous every 6 hours  metoprolol tartrate 25 milliGRAM(s) Oral two times a day  NIFEdipine XL 60 milliGRAM(s) Oral daily  ondansetron Injectable 4 milliGRAM(s) IV Push three times a day PRN  pantoprazole  Injectable 40 milliGRAM(s) IV Push at bedtime  piperacillin/tazobactam IVPB.. 3.375 Gram(s) IV Intermittent every 8 hours  sodium chloride 0.9% lock flush 10 milliLiter(s) IV Push every 1 hour PRN      ROS:   General:  No  fevers, chills  CV:  No pain, palpitations  Pulm:  No dyspnea, cough  GI:  Denies abdominal pain, N/V  :  with Molina      PHYSICAL EXAM:   Vital Signs:  Vital Signs Last 24 Hrs  T(C): 36.4 (20 Oct 2021 12:23), Max: 36.8 (20 Oct 2021 04:40)  T(F): 97.5 (20 Oct 2021 12:23), Max: 98.2 (20 Oct 2021 04:40)  HR: 91 (20 Oct 2021 12:23) (72 - 91)  BP: 135/68 (20 Oct 2021 12:23) (123/92 - 143/69)  BP(mean): --  RR: 18 (20 Oct 2021 12:23) (18 - 18)  SpO2: 100% (20 Oct 2021 12:23) (98% - 100%)  Daily     Daily     GENERAL: no acute distress  NEURO: awake, alert, oriented to self, place, no asterixis  HEENT: anicteric sclera, no conjunctival pallor appreciated  CHEST: no respiratory distress, no accessory muscle use  CARDIAC: regular rate, rhythm  ABDOMEN: soft, non-tender, non-distended, no rebound or guarding, BS+, Molina  EXTREMITIES: RLE with dressing      LABS: reviewed                        9.7    4.07  )-----------( 245      ( 20 Oct 2021 07:22 )             30.7     10-20    143  |  109<H>  |  11  ----------------------------<  110<H>  3.8   |  27  |  1.00    Ca    9.2      20 Oct 2021 07:22  Phos  3.4     10-19  Mg     2.1     10-19    TPro  6.8  /  Alb  2.5<L>  /  TBili  0.6  /  DBili  x   /  AST  32  /  ALT  54  /  AlkPhos  129<H>  10-20    LIVER FUNCTIONS - ( 20 Oct 2021 07:22 )  Alb: 2.5 g/dL / Pro: 6.8 g/dL / ALK PHOS: 129 U/L / ALT: 54 U/L DA / AST: 32 U/L / GGT: x             Interval Diagnostic Studies: see sunrise for full report

## 2021-10-20 NOTE — PROGRESS NOTE ADULT - ASSESSMENT
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  As per daughter, patient was taking tylenol which did not help his pain prompting the patient to ask his daughter to take him to the hospital. ON admission, he has no fever or Leukocytosis. The Xray of Right foot shows no Osteomyelitis but MRI of Right foot shows Lateral soft tissue wound with marrow signal abnormality throughout the fifth metatarsal which is consistent of Osteomyelitis. He has seen by Podiatry and wound culture has sent, Zosyn and Vancomycin has started. The ID consult requested to assist with further evaluation and antibiotic management.     # Right Fifth metatarsal DFU with drainage and Osteomyelitis- wound cx grew Enterococcus, Aeromonas and Klebsiella - Zosyn is the ideal to cover All organisms but kidney function is worsening, hence change to Unasyn and Levaquin until kidney function is improved - The pathology shows Bone with acute osteomyelitis and necrotic periosteal tissue.   # OREN- s/p urinary retention -s/p ibrahim catheter - s/p discontinue ACEI  # RLL pneumonia- most likely Aspiration, S/p Vomiting - On Unasyn  # Large bowel distension  # Candiduria- 9/22/21  # Pulmonary edema with bilateral moderate to large pleural effusions- on CT chest, 10/5/21- s/p intubation 10/7- s/p Right sided chest tube placement by CT team, 10/8/21  # COVID Exposure - PCR negative as of  10/11/21    would recommend:    1. OOB to chair /PT  2. Continue Zosyn  since kidney function is normal , until 11/3/21  3. Monitor  kidney function closely while on Zosyn   4. Wound care as per Podiatry  5. Aspiration precaution       Attending Attestation:    Spent more than 35 minutes on total encounter, more than 50 % of the visit was spent counseling and/or coordinating care by the Attending physician. 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  As per daughter, patient was taking tylenol which did not help his pain prompting the patient to ask his daughter to take him to the hospital. ON admission, he has no fever or Leukocytosis. The Xray of Right foot shows no Osteomyelitis but MRI of Right foot shows Lateral soft tissue wound with marrow signal abnormality throughout the fifth metatarsal which is consistent of Osteomyelitis. He has seen by Podiatry and wound culture has sent, Zosyn and Vancomycin has started. The ID consult requested to assist with further evaluation and antibiotic management.     # Right Fifth metatarsal DFU with drainage and Osteomyelitis- wound cx grew Enterococcus, Aeromonas and Klebsiella - Zosyn is the ideal to cover All organisms but kidney function is worsening, hence change to Unasyn and Levaquin until kidney function is improved - The pathology shows Bone with acute osteomyelitis and necrotic periosteal tissue.   # OREN- s/p urinary retention -s/p ibrahim catheter - s/p discontinue ACEI  # RLL pneumonia- most likely Aspiration, S/p Vomiting - On Unasyn  # Large bowel distension  # Candiduria- 9/22/21  # Pulmonary edema with bilateral moderate to large pleural effusions- on CT chest, 10/5/21- s/p intubation 10/7- s/p Right sided chest tube placement by CT team, 10/8/21  # COVID Exposure - PCR negative as of  10/11/21    would recommend:    1. OOB to chair /PT  2. Continue Zosyn until 11/3/21  3. Monitor  kidney function closely while on Zosyn   4. Wound care as per Podiatry  5. Aspiration precaution       Attending Attestation:    Spent more than 35 minutes on total encounter, more than 50 % of the visit was spent counseling and/or coordinating care by the Attending physician.

## 2021-10-20 NOTE — PROGRESS NOTE ADULT - PROBLEM SELECTOR PLAN 11
COVID exposure needs to test negative on 10/21, and can come off isolation  COVID 10/24 ordered for STEVAN discharge surveillance   PT recommending [facility]- possible discharge on 10/25

## 2021-10-20 NOTE — PROGRESS NOTE ADULT - ASSESSMENT
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 s/p OR debridement today. Nephrology consulted for abrupt rise in SCr.     1. OREN- Recurrent ATN in the setting of infection. Lisinopril discontinued 9/22. ATN resolving with good urine o/p. Now off diuretics; likely recovery phase   s/p CT chest with b/l mod to large pleural effusion R>L. s/p rt pigtail catheter (now removed). Monitor lytes  Kidney/ Bladder US with large distended bladder s/p ibrahim placement on 9/23, then removed. s/p bladder scan 10/4 with 1L urine for which ibrahim was reinserted; however; renal function did not improve post ibrahim; less likely due to obstructive uropathy.  Pt with had another TOV on 10/18 and failed; s/p ibrahim reinsertion; c/w finasteride; recc to add flomax. Recc outpt Urology f/u.   No peripheral eosinophilia- no signs of AIN. C3 & C4 wnl with neg ASO- no signs of PIGN. Strict I/Os. Avoid nephrotoxins/ NSAIDs/ RCA. Monitor BMP.    2. 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 Will defer secondary w/u as an outpt. Avoid nephrotoxins  3. HTN 2/2 CKD- BP acceptable. Continue with current anti-hypertensive medications. Monitor BP.  4. Acute osteomyelitis- s/p debridement 9/22. Pt now on Zosyn. Plan as per ID      [facility] NEPHROLOGY  Bryn Johnson M.D.  Domingo Booth D.O.  Zakia Rodriguez M.D.  Zonia Adams, MSN, ANP-C  [phone]    [address] [place] [address]

## 2021-10-20 NOTE — PROGRESS NOTE ADULT - SUBJECTIVE AND OBJECTIVE BOX
C A R D I O L O G Y  **********************************    DATE OF SERVICE: 10-20-21    Resting comfortable, offers no complaints    acetaminophen   Tablet .. 650 milliGRAM(s) Oral every 6 hours PRN  apixaban 5 milliGRAM(s) Oral two times a day  aspirin  chewable 81 milliGRAM(s) Oral daily  atorvastatin 80 milliGRAM(s) Oral at bedtime  chlorhexidine 2% Cloths 1 Application(s) Topical <User Schedule>  collagenase Ointment 1 Application(s) Topical daily  cyanocobalamin 1000 MICROGram(s) Oral daily  ergocalciferol 90638 Unit(s) Oral <User Schedule>  ferrous    sulfate Liquid 300 milliGRAM(s) Enteral Tube daily  finasteride 5 milliGRAM(s) Oral daily  gabapentin 100 milliGRAM(s) Oral three times a day  insulin lispro (ADMELOG) corrective regimen sliding scale   SubCutaneous every 6 hours  metoprolol tartrate 25 milliGRAM(s) Oral two times a day  NIFEdipine XL 60 milliGRAM(s) Oral daily  ondansetron Injectable 4 milliGRAM(s) IV Push three times a day PRN  pantoprazole  Injectable 40 milliGRAM(s) IV Push at bedtime  piperacillin/tazobactam IVPB.. 3.375 Gram(s) IV Intermittent every 8 hours  sodium chloride 0.9% lock flush 10 milliLiter(s) IV Push every 1 hour PRN                            9.7    4.07  )-----------( 245      ( 20 Oct 2021 07:22 )             30.7       Hemoglobin: 9.7 g/dL (10-20 @ 07:22)  Hemoglobin: 9.2 g/dL (10-19 @ 06:34)  Hemoglobin: 9.1 g/dL (10-18 @ 07:50)  Hemoglobin: 9.5 g/dL (10-17 @ 06:20)  Hemoglobin: 8.7 g/dL (10-16 @ 06:33)      10-20    143  |  109<H>  |  11  ----------------------------<  110<H>  3.8   |  27  |  1.00    Ca    9.2      20 Oct 2021 07:22  Phos  3.4     10-19  Mg     2.1     10-19    TPro  6.8  /  Alb  2.5<L>  /  TBili  0.6  /  DBili  x   /  AST  32  /  ALT  54  /  AlkPhos  129<H>  10-20    Creatinine Trend: 1.00<--, 0.97<--, 0.97<--, 1.05<--, 0.97<--, 0.99<--    COAGS:           T(C): 36.8 (10-20-21 @ 04:40), Max: 37 (10-19-21 @ 12:45)  HR: 86 (10-20-21 @ 04:40) (72 - 86)  BP: 143/69 (10-20-21 @ 04:40) (123/92 - 143/69)  RR: 18 (10-20-21 @ 04:40) (18 - 18)  SpO2: 98% (10-20-21 @ 04:40) (98% - 100%)  Wt(kg): --    I&O's Summary    19 Oct 2021 07:01  -  20 Oct 2021 07:00  --------------------------------------------------------  IN: 0 mL / OUT: 2100 mL / NET: -2100 mL        HEENT:  (-)icterus (-)pallor  CV: N S1 S2 1/6 TRINIDAD (+)2 Pulses B/l  Resp:  Coarse sounds B/L, normal effort  GI: (+) BS Soft, NT, ND  Lymph:  (-)Edema, (-)obvious lymphadenopathy  Skin: Warm to touch, Normal turgor  Psych: Unable to adequately  mood and affect        ASSESSMENT/PLAN: 	76y  Male PMH DM on insulin, HTN, HLD, normal lV fx moderate to severe AS 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    - crt improved  -  complete course of Abx per ID  - Echo noted, Severe AS, EF 40-45%   - He will need a SABIHA and R+L heart cath when he recovers and has no active infection  - Cont medical management of CAD  - Pt. with new onset PAF now on Eliquis  - Pig tail removed on 10/16  - D/C planning per medical team    Zak Wilkins MD, Located within [facility]  BEEPER [phone]   C A R D I O L O G Y  **********************************    DATE OF SERVICE: 10-20-21    Resting comfortable, offers no complaints    acetaminophen   Tablet .. 650 milliGRAM(s) Oral every 6 hours PRN  apixaban 5 milliGRAM(s) Oral two times a day  aspirin  chewable 81 milliGRAM(s) Oral daily  atorvastatin 80 milliGRAM(s) Oral at bedtime  chlorhexidine 2% Cloths 1 Application(s) Topical <User Schedule>  collagenase Ointment 1 Application(s) Topical daily  cyanocobalamin 1000 MICROGram(s) Oral daily  ergocalciferol 03047 Unit(s) Oral <User Schedule>  ferrous    sulfate Liquid 300 milliGRAM(s) Enteral Tube daily  finasteride 5 milliGRAM(s) Oral daily  gabapentin 100 milliGRAM(s) Oral three times a day  insulin lispro (ADMELOG) corrective regimen sliding scale   SubCutaneous every 6 hours  metoprolol tartrate 25 milliGRAM(s) Oral two times a day  NIFEdipine XL 60 milliGRAM(s) Oral daily  ondansetron Injectable 4 milliGRAM(s) IV Push three times a day PRN  pantoprazole  Injectable 40 milliGRAM(s) IV Push at bedtime  piperacillin/tazobactam IVPB.. 3.375 Gram(s) IV Intermittent every 8 hours  sodium chloride 0.9% lock flush 10 milliLiter(s) IV Push every 1 hour PRN                            9.7    4.07  )-----------( 245      ( 20 Oct 2021 07:22 )             30.7       Hemoglobin: 9.7 g/dL (10-20 @ 07:22)  Hemoglobin: 9.2 g/dL (10-19 @ 06:34)  Hemoglobin: 9.1 g/dL (10-18 @ 07:50)  Hemoglobin: 9.5 g/dL (10-17 @ 06:20)  Hemoglobin: 8.7 g/dL (10-16 @ 06:33)      10-20    143  |  109<H>  |  11  ----------------------------<  110<H>  3.8   |  27  |  1.00    Ca    9.2      20 Oct 2021 07:22  Phos  3.4     10-19  Mg     2.1     10-19    TPro  6.8  /  Alb  2.5<L>  /  TBili  0.6  /  DBili  x   /  AST  32  /  ALT  54  /  AlkPhos  129<H>  10-20    Creatinine Trend: 1.00<--, 0.97<--, 0.97<--, 1.05<--, 0.97<--, 0.99<--    COAGS:       T(C): 36.8 (10-20-21 @ 04:40), Max: 37 (10-19-21 @ 12:45)  HR: 86 (10-20-21 @ 04:40) (72 - 86)  BP: 143/69 (10-20-21 @ 04:40) (123/92 - 143/69)  RR: 18 (10-20-21 @ 04:40) (18 - 18)  SpO2: 98% (10-20-21 @ 04:40) (98% - 100%)  Wt(kg): --    I&O's Summary    19 Oct 2021 07:01  -  20 Oct 2021 07:00  --------------------------------------------------------  IN: 0 mL / OUT: 2100 mL / NET: -2100 mL    HEENT:  (-)icterus (-)pallor  CV: N S1 S2 1/6 TRINIDAD (+)2 Pulses B/l  Resp:  Coarse sounds B/L, normal effort  GI: (+) BS Soft, NT, ND  Lymph:  (-)Edema, (-)obvious lymphadenopathy  Skin: Warm to touch, Normal turgor  Psych: Unable to adequately  mood and affect        ASSESSMENT/PLAN: 	76y  Male PMH DM on insulin, HTN, HLD, normal lV fx moderate to severe AS 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    - crt improved  -  complete course of Abx per ID  - Echo noted, Severe AS, EF 40-45%   - He will need a SABIHA and R+L heart cath when he recovers and has no active infection  - Cont medical management of CAD  - Pt. with new onset PAF now on Eliquis  - Pig tail removed on 10/16  - D/C planning per medical team    Zak Wilkins MD, [facility]  BEEPER [phone]

## 2021-10-21 LAB
ALBUMIN SERPL ELPH-MCNC: 2.4 G/DL — LOW (ref 3.5–5)
ALP SERPL-CCNC: 130 U/L — HIGH (ref 40–120)
ALT FLD-CCNC: 50 U/L DA — SIGNIFICANT CHANGE UP (ref 10–60)
ANION GAP SERPL CALC-SCNC: 5 MMOL/L — SIGNIFICANT CHANGE UP (ref 5–17)
AST SERPL-CCNC: 31 U/L — SIGNIFICANT CHANGE UP (ref 10–40)
BILIRUB SERPL-MCNC: 0.7 MG/DL — SIGNIFICANT CHANGE UP (ref 0.2–1.2)
BUN SERPL-MCNC: 11 MG/DL — SIGNIFICANT CHANGE UP (ref 7–18)
CALCIUM SERPL-MCNC: 9 MG/DL — SIGNIFICANT CHANGE UP (ref 8.4–10.5)
CHLORIDE SERPL-SCNC: 109 MMOL/L — HIGH (ref 96–108)
CO2 SERPL-SCNC: 28 MMOL/L — SIGNIFICANT CHANGE UP (ref 22–31)
CREAT SERPL-MCNC: 0.84 MG/DL — SIGNIFICANT CHANGE UP (ref 0.5–1.3)
GLUCOSE BLDC GLUCOMTR-MCNC: 109 MG/DL — HIGH (ref 70–99)
GLUCOSE BLDC GLUCOMTR-MCNC: 110 MG/DL — HIGH (ref 70–99)
GLUCOSE BLDC GLUCOMTR-MCNC: 112 MG/DL — HIGH (ref 70–99)
GLUCOSE BLDC GLUCOMTR-MCNC: 118 MG/DL — HIGH (ref 70–99)
GLUCOSE BLDC GLUCOMTR-MCNC: 96 MG/DL — SIGNIFICANT CHANGE UP (ref 70–99)
GLUCOSE SERPL-MCNC: 106 MG/DL — HIGH (ref 70–99)
HCT VFR BLD CALC: 29.6 % — LOW (ref 39–50)
HGB BLD-MCNC: 9.6 G/DL — LOW (ref 13–17)
MCHC RBC-ENTMCNC: 29.6 PG — SIGNIFICANT CHANGE UP (ref 27–34)
MCHC RBC-ENTMCNC: 32.4 GM/DL — SIGNIFICANT CHANGE UP (ref 32–36)
MCV RBC AUTO: 91.4 FL — SIGNIFICANT CHANGE UP (ref 80–100)
NRBC # BLD: 0 /100 WBCS — SIGNIFICANT CHANGE UP (ref 0–0)
PLATELET # BLD AUTO: 276 K/UL — SIGNIFICANT CHANGE UP (ref 150–400)
POTASSIUM SERPL-MCNC: 3.8 MMOL/L — SIGNIFICANT CHANGE UP (ref 3.5–5.3)
POTASSIUM SERPL-SCNC: 3.8 MMOL/L — SIGNIFICANT CHANGE UP (ref 3.5–5.3)
PROT SERPL-MCNC: 6.8 G/DL — SIGNIFICANT CHANGE UP (ref 6–8.3)
RBC # BLD: 3.24 M/UL — LOW (ref 4.2–5.8)
RBC # FLD: 14.6 % — HIGH (ref 10.3–14.5)
SARS-COV-2 RNA SPEC QL NAA+PROBE: SIGNIFICANT CHANGE UP
SODIUM SERPL-SCNC: 142 MMOL/L — SIGNIFICANT CHANGE UP (ref 135–145)
WBC # BLD: 4.77 K/UL — SIGNIFICANT CHANGE UP (ref 3.8–10.5)
WBC # FLD AUTO: 4.77 K/UL — SIGNIFICANT CHANGE UP (ref 3.8–10.5)

## 2021-10-21 RX ORDER — SODIUM CHLORIDE 9 MG/ML
1000 INJECTION, SOLUTION INTRAVENOUS
Refills: 0 | Status: DISCONTINUED | OUTPATIENT
Start: 2021-10-21 | End: 2021-11-03

## 2021-10-21 RX ORDER — INSULIN LISPRO 100/ML
VIAL (ML) SUBCUTANEOUS
Refills: 0 | Status: DISCONTINUED | OUTPATIENT
Start: 2021-10-21 | End: 2021-11-03

## 2021-10-21 RX ORDER — GLUCAGON INJECTION, SOLUTION 0.5 MG/.1ML
1 INJECTION, SOLUTION SUBCUTANEOUS ONCE
Refills: 0 | Status: DISCONTINUED | OUTPATIENT
Start: 2021-10-21 | End: 2021-11-03

## 2021-10-21 RX ADMIN — APIXABAN 5 MILLIGRAM(S): 2.5 TABLET, FILM COATED ORAL at 06:27

## 2021-10-21 RX ADMIN — PIPERACILLIN AND TAZOBACTAM 25 GRAM(S): 4; .5 INJECTION, POWDER, LYOPHILIZED, FOR SOLUTION INTRAVENOUS at 22:45

## 2021-10-21 RX ADMIN — GABAPENTIN 100 MILLIGRAM(S): 400 CAPSULE ORAL at 13:34

## 2021-10-21 RX ADMIN — APIXABAN 5 MILLIGRAM(S): 2.5 TABLET, FILM COATED ORAL at 18:00

## 2021-10-21 RX ADMIN — Medication 25 MILLIGRAM(S): at 06:27

## 2021-10-21 RX ADMIN — GABAPENTIN 100 MILLIGRAM(S): 400 CAPSULE ORAL at 06:31

## 2021-10-21 RX ADMIN — Medication 60 MILLIGRAM(S): at 06:27

## 2021-10-21 RX ADMIN — GABAPENTIN 100 MILLIGRAM(S): 400 CAPSULE ORAL at 22:45

## 2021-10-21 RX ADMIN — PANTOPRAZOLE SODIUM 40 MILLIGRAM(S): 20 TABLET, DELAYED RELEASE ORAL at 22:45

## 2021-10-21 RX ADMIN — Medication 1 APPLICATION(S): at 12:13

## 2021-10-21 RX ADMIN — ATORVASTATIN CALCIUM 80 MILLIGRAM(S): 80 TABLET, FILM COATED ORAL at 22:45

## 2021-10-21 RX ADMIN — PIPERACILLIN AND TAZOBACTAM 25 GRAM(S): 4; .5 INJECTION, POWDER, LYOPHILIZED, FOR SOLUTION INTRAVENOUS at 06:31

## 2021-10-21 RX ADMIN — PIPERACILLIN AND TAZOBACTAM 25 GRAM(S): 4; .5 INJECTION, POWDER, LYOPHILIZED, FOR SOLUTION INTRAVENOUS at 13:34

## 2021-10-21 RX ADMIN — CHLORHEXIDINE GLUCONATE 1 APPLICATION(S): 213 SOLUTION TOPICAL at 06:27

## 2021-10-21 RX ADMIN — Medication 25 MILLIGRAM(S): at 18:00

## 2021-10-21 NOTE — PROGRESS NOTE ADULT - PROBLEM SELECTOR PLAN 5
Controlled  Pt takes Lisinopril, Nifedipine and Metoprolol at home  Continue home regimen Nifedipine with parameters  Continue reduced dose Metoprolol with parameters  Monitor BP

## 2021-10-21 NOTE — PROGRESS NOTE ADULT - SUBJECTIVE AND OBJECTIVE BOX
C A R D I O L O G Y  **********************************    DATE OF SERVICE: 10-21-21    Patient denies chest pain or shortness of breath.   Review of symptoms otherwise negative.    acetaminophen   Tablet .. 650 milliGRAM(s) Oral every 6 hours PRN  apixaban 5 milliGRAM(s) Oral two times a day  aspirin  chewable 81 milliGRAM(s) Oral daily  atorvastatin 80 milliGRAM(s) Oral at bedtime  chlorhexidine 2% Cloths 1 Application(s) Topical <User Schedule>  collagenase Ointment 1 Application(s) Topical daily  cyanocobalamin 1000 MICROGram(s) Oral daily  ergocalciferol 62222 Unit(s) Oral <User Schedule>  ferrous    sulfate Liquid 300 milliGRAM(s) Enteral Tube daily  finasteride 5 milliGRAM(s) Oral daily  gabapentin 100 milliGRAM(s) Oral three times a day  insulin lispro (ADMELOG) corrective regimen sliding scale   SubCutaneous every 6 hours  metoprolol tartrate 25 milliGRAM(s) Oral two times a day  NIFEdipine XL 60 milliGRAM(s) Oral daily  ondansetron Injectable 4 milliGRAM(s) IV Push three times a day PRN  pantoprazole  Injectable 40 milliGRAM(s) IV Push at bedtime  piperacillin/tazobactam IVPB.. 3.375 Gram(s) IV Intermittent every 8 hours  sodium chloride 0.9% lock flush 10 milliLiter(s) IV Push every 1 hour PRN                            9.6    4.77  )-----------( 276      ( 21 Oct 2021 04:56 )             29.6       Hemoglobin: 9.6 g/dL (10-21 @ 04:56)  Hemoglobin: 9.7 g/dL (10-20 @ 07:22)  Hemoglobin: 9.2 g/dL (10-19 @ 06:34)  Hemoglobin: 9.1 g/dL (10-18 @ 07:50)  Hemoglobin: 9.5 g/dL (10-17 @ 06:20)      10-21    142  |  109<H>  |  11  ----------------------------<  106<H>  3.8   |  28  |  0.84    Ca    9.0      21 Oct 2021 04:56    TPro  6.8  /  Alb  2.4<L>  /  TBili  0.7  /  DBili  x   /  AST  31  /  ALT  50  /  AlkPhos  130<H>  10-21    Creatinine Trend: 0.84<--, 1.00<--, 0.97<--, 0.97<--, 1.05<--, 0.97<--    COAGS:           T(C): 36.4 (10-21-21 @ 04:45), Max: 36.5 (10-20-21 @ 20:27)  HR: 97 (10-21-21 @ 04:45) (77 - 97)  BP: 112/69 (10-21-21 @ 04:45) (112/69 - 144/72)  RR: 18 (10-21-21 @ 04:45) (18 - 18)  SpO2: 100% (10-21-21 @ 04:45) (100% - 100%)  Wt(kg): --    I&O's Summary    20 Oct 2021 07:01  -  21 Oct 2021 07:00  --------------------------------------------------------  IN: 0 mL / OUT: 1600 mL / NET: -1600 mL      HEENT:  (-)icterus (-)pallor  CV: N S1 S2 1/6 TRINIDAD (+)2 Pulses B/l  Resp:  Coarse sounds B/L, normal effort  GI: (+) BS Soft, NT, ND  Lymph:  (-)Edema, (-)obvious lymphadenopathy  Skin: Warm to touch, Normal turgor  Psych: Unable to adequately  mood and affect        ASSESSMENT/PLAN: 	76y  Male PMH DM on insulin, HTN, HLD, normal lV fx moderate to severe AS 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    - crt improved  -  complete course of Abx per ID  - Echo noted, Severe AS, EF 40-45%   - He will need a SABIHA and R+L heart cath when he recovers and has no active infection  - Cont medical management of CAD  - Pt. with new onset PAF now on Eliquis  - Pig tail removed on 10/16  - D/C planning per medical team    Zak Wilkins MD, [facility]  BEEPER [phone]

## 2021-10-21 NOTE — PROGRESS NOTE ADULT - SUBJECTIVE AND OBJECTIVE BOX
Podiatry Interval: Pt was seen resting in bed. Pt was verbal. Pt denies N/V/F/C/SOB. Pt admits to pain in the right foot. Pt had a wound vac on that was removed on 10/13.     Podiatry HPI: 76 year old male with a PMHx of DM, HTN, HLD, CAD to ED presents to the ED for a Right Foot s/p 5th foot partial ray resection and fillet toe flap. Patient states that he has sharp localized non-radiating pain to the Right foot 5th metatarsal. States that after his surgery, he did not see a podiatrist and he came into the ED because he saw drainage and was having pain. Denies any constitutional symptoms of N/V/C/F/SOB. Denies any other pedal complaints at this time. Denies calf pain today, bilaterally.      Medications acetaminophen   Tablet .. 650 milliGRAM(s) Oral every 6 hours PRN  apixaban 5 milliGRAM(s) Oral two times a day  aspirin  chewable 81 milliGRAM(s) Oral daily  atorvastatin 80 milliGRAM(s) Oral at bedtime  chlorhexidine 2% Cloths 1 Application(s) Topical <User Schedule>  collagenase Ointment 1 Application(s) Topical daily  cyanocobalamin 1000 MICROGram(s) Oral daily  ergocalciferol 76842 Unit(s) Oral <User Schedule>  ferrous    sulfate Liquid 300 milliGRAM(s) Enteral Tube daily  finasteride 5 milliGRAM(s) Oral daily  gabapentin 100 milliGRAM(s) Oral three times a day  insulin lispro (ADMELOG) corrective regimen sliding scale   SubCutaneous every 6 hours  metoprolol tartrate 25 milliGRAM(s) Oral two times a day  NIFEdipine XL 60 milliGRAM(s) Oral daily  ondansetron Injectable 4 milliGRAM(s) IV Push three times a day PRN  pantoprazole  Injectable 40 milliGRAM(s) IV Push at bedtime  piperacillin/tazobactam IVPB.. 3.375 Gram(s) IV Intermittent every 8 hours  sodium chloride 0.9% lock flush 10 milliLiter(s) IV Push every 1 hour PRN    FH: Family history of acute myocardial infarction (Father)    Family history of diabetes mellitus (Mother, Sibling)    Family history of hypertension (Mother, Sibling)    Family history of hyperlipidemia (Mother, Sibling)    ,   PMH: HTN (hypertension)    HLD (hyperlipidemia)    DM (diabetes mellitus)    CAD (coronary artery disease)    Glaucoma, angle-closure    [place] (hard of hearing)       PSH: No significant past surgical history    S/P CABG (coronary artery bypass graft)    History of thyroid surgery        Labs                          9.6    4.77  )-----------( 276      ( 21 Oct 2021 04:56 )             29.6      10-21    142  |  109<H>  |  11  ----------------------------<  106<H>  3.8   |  28  |  0.84    Ca    9.0      21 Oct 2021 04:56    TPro  6.8  /  Alb  2.4<L>  /  TBili  0.7  /  DBili  x   /  AST  31  /  ALT  50  /  AlkPhos  130<H>  10-21     Vital Signs Last 24 Hrs  T(C): 36.2 (21 Oct 2021 13:43), Max: 36.5 (20 Oct 2021 20:27)  T(F): 97.2 (21 Oct 2021 13:43), Max: 97.7 (20 Oct 2021 20:27)  HR: 81 (21 Oct 2021 13:43) (77 - 97)  BP: 114/62 (21 Oct 2021 13:43) (112/69 - 144/72)  BP(mean): --  RR: 17 (21 Oct 2021 13:43) (17 - 18)  SpO2: 100% (21 Oct 2021 13:43) (100% - 100%)  Sedimentation Rate, Erythrocyte: 85 mm/Hr (10-08-21 @ 03:52)  Sedimentation Rate, Erythrocyte: 108 mm/Hr (10-02-21 @ 07:02)         C-Reactive Protein, Serum: 53 mg/L (10-08-21 @ 12:10)  C-Reactive Protein, Serum: 43 mg/L (10-02-21 @ 14:05)  C-Reactive Protein, Serum: 45 mg/L (09-16-21 @ 23:33)  C-Reactive Protein, Serum: 85 mg/L (08-22-21 @ 21:30)  C-Reactive Protein, Serum: 67 mg/L (08-15-21 @ 11:55)   WBC Count: 4.77 K/uL (10-21-21 @ 04:56)    CAPILLARY BLOOD GLUCOSE    POCT Blood Glucose.: 112 mg/dL (21 Oct 2021 12:03)  POCT Blood Glucose.: 118 mg/dL (21 Oct 2021 07:04)  POCT Blood Glucose.: 110 mg/dL (21 Oct 2021 00:10)  POCT Blood Glucose.: 118 mg/dL (20 Oct 2021 21:07)  POCT Blood Glucose.: 126 mg/dL (20 Oct 2021 16:31)    ROS: All others negative unless otherwise stated in the HPI      PHYSICAL EXAM  LE Focused:    Vasc:  DP/PT pulses were faintly palpable, bilateral. DP/PT pulses were monophasic on doppler, bilaterally. CFT<3 seconds to all digits.   Derm: Surgical site with graft in place to R 5th ray, incorporating well, granular wound bed through the graft with patches of necrotic islands noted, minimal drainage or discharge. minimal erythema and edema noted, eschar forming over the wound. Dorsal foot wound noted with tendon exposed, DTI noted to b/l heel  Neuro: Protective sensation grossly diminished, b/l  MSK: 5/5 muscle strength noted to the pedal compartments. 5th digit amputation R foot        Imaging:    3 views right foot. Prior 8/20/2021.    Status post resection of the fifth toe and distal fifth metatarsal unchanged in appearance. No focal bone lysis or unusual periosteal reaction to suggest osteomyelitis. No acute fracture or dislocation. The remainder study unchanged. No soft tissue gas.    IMPRESSION: No acute finding. No plain film evidence of osteomyelitis.      MRI R foot:     INTERPRETATION:  Clinical Information: Recent fifth metatarsal head resection now with fifth digit pain, swelling and foul discharge.    Comparison: Radiographs of the right foot from 9/16/2021 and MRI the right foot from 8/16/2021.    Technique:  MRI of the right midfoot and forefoot.  Intravenous Contrast: None.    Findings:    There is a resection at the mid aspect of the fifth metatarsal shaft. There is a lateral soft tissue wound beginning at the level of the amputation which extends distally. There is susceptibility artifact in the region of the amputation consistent with postoperative change. There is hyperintense T2 marrow signal throughout the remaining fifth metatarsal and within the adjacent fourth metatarsal head which is nonspecific and could be related to recent postoperative changes although osteomyelitis is suspected.    There is edema and mild atrophy within the plantar muscles of the foot. There is minimal spurring at the first metatarsophalangeal and hallux sesamoid articulations.    Impression:  Resection at the mid aspect of the fifth metatarsal. Lateral soft tissue wound with marrow signal abnormality throughout the fifth metatarsal and within the adjacent fourth metatarsal head which is nonspecific in the setting of recent surgery although osteomyelitis is suspected.        Culture Results:     Rare Aeromonas hydrophila/caviae   Few Klebsiella oxytoca/Raoutella ornithinolytica   Few Enterococcus faecalis   Few Streptococcus agalactiae (Group B) isolated   Group B streptococci are susceptible to ampicillin,   penicillin and cefazolin, but may be resistant to   erythromycin and clindamycin.   Recommendations for intrapartum prophylaxis for Group B   streptococci are penicillin or ampicillin. (09.16.21 @ 21:42)     Gram Stain:   No polymorphonuclear cells seen per low power field   No organisms seen per oil power field (09.22.21 @ 13:54)       Historical Values  Gram Stain:   No polymorphonuclear cells seen per low power field   No organisms seen per oil power field (09.22.21 @ 13:54)   Gram Stain:   No polymorphonuclear leukocytes seen per low power field   No organisms seen per oil power field (08.20.21 @ 03:59)

## 2021-10-21 NOTE — PROGRESS NOTE ADULT - ASSESSMENT
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  As per daughter, patient was taking tylenol which did not help his pain prompting the patient to ask his daughter to take him to the hospital. ON admission, he has no fever or Leukocytosis. The Xray of Right foot shows no Osteomyelitis but MRI of Right foot shows Lateral soft tissue wound with marrow signal abnormality throughout the fifth metatarsal which is consistent of Osteomyelitis. He has seen by Podiatry and wound culture has sent, Zosyn and Vancomycin has started. The ID consult requested to assist with further evaluation and antibiotic management.     # Right Fifth metatarsal DFU with drainage and Osteomyelitis- wound cx grew Enterococcus, Aeromonas and Klebsiella - Zosyn is the ideal to cover All organisms but kidney function is worsening, hence change to Unasyn and Levaquin until kidney function is improved - The pathology shows Bone with acute osteomyelitis and necrotic periosteal tissue.   # OREN- s/p urinary retention -s/p ibrahim catheter - s/p discontinue ACEI  # RLL pneumonia- most likely Aspiration, S/p Vomiting - On Unasyn  # Large bowel distension  # Candiduria- 9/22/21  # Pulmonary edema with bilateral moderate to large pleural effusions- on CT chest, 10/5/21- s/p intubation 10/7- s/p Right sided chest tube placement by CT team, 10/8/21  # COVID Exposure - PCR negative as of  10/11/21    would recommend:    1. OOB to chair /PT  2. Continue Zosyn until 11/3/21  3. Monitor  kidney function closely while on Zosyn   4. Wound care as per Podiatry  5. Aspiration precaution       Attending Attestation:    Spent more than 35 minutes on total encounter, more than 50 % of the visit was spent counseling and/or coordinating care by the Attending physician. 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  As per daughter, patient was taking tylenol which did not help his pain prompting the patient to ask his daughter to take him to the hospital. ON admission, he has no fever or Leukocytosis. The Xray of Right foot shows no Osteomyelitis but MRI of Right foot shows Lateral soft tissue wound with marrow signal abnormality throughout the fifth metatarsal which is consistent of Osteomyelitis. He has seen by Podiatry and wound culture has sent, Zosyn and Vancomycin has started. The ID consult requested to assist with further evaluation and antibiotic management.     # Right Fifth metatarsal DFU with drainage and Osteomyelitis- wound cx grew Enterococcus, Aeromonas and Klebsiella - Zosyn is the ideal to cover All organisms but kidney function is worsening, hence change to Unasyn and Levaquin until kidney function is improved - The pathology shows Bone with acute osteomyelitis and necrotic periosteal tissue.   # OREN- s/p urinary retention -s/p ibrahim catheter - s/p discontinue ACEI  # RLL pneumonia- most likely Aspiration, S/p Vomiting - On Unasyn  # Large bowel distension  # Candiduria- 9/22/21  # Pulmonary edema with bilateral moderate to large pleural effusions- on CT chest, 10/5/21- s/p intubation 10/7- s/p Right sided chest tube placement by CT team, 10/8/21  # COVID Exposure - PCR negative as of  10/11/21    would recommend:    1. Aspiration precaution    2. Continue Zosyn until 11/3/21  3. Monitor  kidney function closely while on Zosyn   4. Wound care as per Podiatry  5. OOB to chair /PT    Attending Attestation:    Spent more than 35 minutes on total encounter, more than 50 % of the visit was spent counseling and/or coordinating care by the Attending physician.

## 2021-10-21 NOTE — PROGRESS NOTE ADULT - PROBLEM SELECTOR PLAN 11
Pt with COVID exposure, quarantine until 11/24  Pt will be medically stable for discharge s/p quarantine  Repeat PCR 10/24    PT recommending [facility]  Family has selected [facility]  Care management following for discharge planning

## 2021-10-21 NOTE — PROGRESS NOTE ADULT - PROBLEM SELECTOR PLAN 9
Pt will need outpatient cardiac catheterization once infection treated  Dr. Wilkins, Cardiology, following

## 2021-10-21 NOTE — PROGRESS NOTE ADULT - PROBLEM SELECTOR PLAN 2
Likely secondary to pleural effusion caused by CHF   fluid consistent with transudative  process  S/P extubation on 10/13  Pigtail removed 10/15  Post CXR noted above  SPO2 100% on room air

## 2021-10-21 NOTE — CHART NOTE - NSCHARTNOTEFT_GEN_A_CORE
Reassessment:     Patient is a 76y old  Male who presents with a chief complaint of R Foot Pain (21 Oct 2021 17:12)      Factors impacting intake: [ ] none [ ] nausea  [ ] vomiting [ ] diarrhea [ ] constipation  [ ]chewing problems [ ] swallowing issues  [X ] other: COVID exposed, CKD stage 3, diabetic, osteomyelitis of rt. foot     Diet Prescription: Diet, Dysphagia 1 Pureed-Thin Liquids:   Consistent Carbohydrate {Evening Snacks}  DASH/TLC {Sodium & Cholesterol Restricted} (10-14-21 @ 21:08)    Intake: Patient on airborne precaution, in isolation, d/w PCA/nursing, reports of decrease po intake today - "feeling unwell today", consumed ~35% of lunch meal, no reports of GI distress, rec. add Nepro 1can BID to diet as ordered, s/p R 5th ray resection -  with wound care, Podiatry following. Nursing to continue feeding assistance and encouragement, aspiration precaution. RD available.          Daily Weight in k.8 (16 Oct 2021 05:00)    % Weight Change: stable     Pertinent Medications: MEDICATIONS  (STANDING):  apixaban 5 milliGRAM(s) Oral two times a day  aspirin  chewable 81 milliGRAM(s) Oral daily  atorvastatin 80 milliGRAM(s) Oral at bedtime  chlorhexidine 2% Cloths 1 Application(s) Topical <User Schedule>  collagenase Ointment 1 Application(s) Topical daily  cyanocobalamin 1000 MICROGram(s) Oral daily  ergocalciferol 71340 Unit(s) Oral <User Schedule>  ferrous    sulfate Liquid 300 milliGRAM(s) Enteral Tube daily  finasteride 5 milliGRAM(s) Oral daily  gabapentin 100 milliGRAM(s) Oral three times a day  insulin lispro (ADMELOG) corrective regimen sliding scale   SubCutaneous every 6 hours  metoprolol tartrate 25 milliGRAM(s) Oral two times a day  NIFEdipine XL 60 milliGRAM(s) Oral daily  pantoprazole  Injectable 40 milliGRAM(s) IV Push at bedtime  piperacillin/tazobactam IVPB.. 3.375 Gram(s) IV Intermittent every 8 hours    MEDICATIONS  (PRN):  acetaminophen   Tablet .. 650 milliGRAM(s) Oral every 6 hours PRN Temp greater or equal to 38C (100.4F), Moderate Pain (4 - 6)  ondansetron Injectable 4 milliGRAM(s) IV Push three times a day PRN Nausea and/or Vomiting  sodium chloride 0.9% lock flush 10 milliLiter(s) IV Push every 1 hour PRN Pre/post blood products, medications, blood draw, and to maintain line patency    Pertinent Labs: 10-21 Na142 mmol/L Glu 106 mg/dL<H> K+ 3.8 mmol/L Cr  0.84 mg/dL BUN 11 mg/dL 10-19 Phos 3.4 mg/dL 10-21 Alb 2.4 g/dL<L>     CAPILLARY BLOOD GLUCOSE      POCT Blood Glucose.: 109 mg/dL (21 Oct 2021 16:40)  POCT Blood Glucose.: 112 mg/dL (21 Oct 2021 12:03)  POCT Blood Glucose.: 118 mg/dL (21 Oct 2021 07:04)  POCT Blood Glucose.: 110 mg/dL (21 Oct 2021 00:10)  POCT Blood Glucose.: 118 mg/dL (20 Oct 2021 21:07)    Skin:     Estimated Needs:   [ ] no change since previous assessment  [ ] recalculated:     Previous Nutrition Diagnosis:   [ ] Inadequate Energy Intake [ ]Inadequate Oral Intake [ ] Excessive Energy Intake   [ ] Underweight [ ] Increased Nutrient Needs [ ] Overweight/Obesity   [ ] Altered GI Function [ ] Unintended Weight Loss [ ] Food & Nutrition Related Knowledge Deficit [ ] Malnutrition     Nutrition Diagnosis is [ ] ongoing  [ ] resolved [ ] not applicable     New Nutrition Diagnosis: [ ] not applicable       Interventions:   Recommend  [ ] Change Diet To:  [ ] Nutrition Supplement  [ ] Nutrition Support  [ ] Other:     Monitoring and Evaluation:   [ ] PO intake [ x ] Tolerance to diet prescription [ x ] weights [ x ] labs[ x ] follow up per protocol  [ ] other: Reassessment:     Patient is a 76y old  Male who presents with a chief complaint of R Foot Pain (21 Oct 2021 17:12)      Factors impacting intake: [ ] none [ ] nausea  [ ] vomiting [ ] diarrhea [ ] constipation  [ ]chewing problems [ ] swallowing issues  [X ] other: COVID exposed, CKD stage 3, diabetic, osteomyelitis of rt. foot     Diet Prescription: Diet, Dysphagia 1 Pureed-Thin Liquids:   Consistent Carbohydrate {Evening Snacks}  DASH/TLC {Sodium & Cholesterol Restricted} (10-14-21 @ 21:08)    Intake: Patient on airborne precaution, in isolation, d/w PCA/nursing, reports of decrease po intake today - "feeling unwell today", consumed ~35% of lunch meal, no reports of GI distress, rec. add Nepro 1can BID to diet as ordered, s/p R 5th ray resection -  with wound care, Podiatry following. Nursing to continue feeding assistance and encouragement, aspiration precaution. RD available.          Daily Weight in k.8 (16 Oct 2021 05:00)    % Weight Change:     Pertinent Medications: MEDICATIONS  (STANDING):  apixaban 5 milliGRAM(s) Oral two times a day  aspirin  chewable 81 milliGRAM(s) Oral daily  atorvastatin 80 milliGRAM(s) Oral at bedtime  chlorhexidine 2% Cloths 1 Application(s) Topical <User Schedule>  collagenase Ointment 1 Application(s) Topical daily  cyanocobalamin 1000 MICROGram(s) Oral daily  ergocalciferol 99059 Unit(s) Oral <User Schedule>  ferrous    sulfate Liquid 300 milliGRAM(s) Enteral Tube daily  finasteride 5 milliGRAM(s) Oral daily  gabapentin 100 milliGRAM(s) Oral three times a day  insulin lispro (ADMELOG) corrective regimen sliding scale   SubCutaneous every 6 hours  metoprolol tartrate 25 milliGRAM(s) Oral two times a day  NIFEdipine XL 60 milliGRAM(s) Oral daily  pantoprazole  Injectable 40 milliGRAM(s) IV Push at bedtime  piperacillin/tazobactam IVPB.. 3.375 Gram(s) IV Intermittent every 8 hours    MEDICATIONS  (PRN):  acetaminophen   Tablet .. 650 milliGRAM(s) Oral every 6 hours PRN Temp greater or equal to 38C (100.4F), Moderate Pain (4 - 6)  ondansetron Injectable 4 milliGRAM(s) IV Push three times a day PRN Nausea and/or Vomiting  sodium chloride 0.9% lock flush 10 milliLiter(s) IV Push every 1 hour PRN Pre/post blood products, medications, blood draw, and to maintain line patency    Pertinent Labs: 10- Na142 mmol/L Glu 106 mg/dL<H> K+ 3.8 mmol/L Cr  0.84 mg/dL BUN 11 mg/dL 10-19 Phos 3.4 mg/dL 10-21 Alb 2.4 g/dL<L>     CAPILLARY BLOOD GLUCOSE      POCT Blood Glucose.: 109 mg/dL (21 Oct 2021 16:40)  POCT Blood Glucose.: 112 mg/dL (21 Oct 2021 12:03)  POCT Blood Glucose.: 118 mg/dL (21 Oct 2021 07:04)  POCT Blood Glucose.: 110 mg/dL (21 Oct 2021 00:10)  POCT Blood Glucose.: 118 mg/dL (20 Oct 2021 21:07)    Skin:  rt. foot wound care    Estimated Needs:   [X ] no change since previous assessment  [ ] recalculated:     Previous Nutrition Diagnosis:   [ ] Inadequate Energy Intake [X ]Inadequate Oral Intake [ ] Excessive Energy Intake   [ ] Underweight [ ] Increased Nutrient Needs [ ] Overweight/Obesity   [ ] Altered GI Function [ ] Unintended Weight Loss [ ] Food & Nutrition Related Knowledge Deficit [ ] Malnutrition     Nutrition Diagnosis is [X ] ongoing  [ ] resolved [ ] not applicable     New Nutrition Diagnosis: [ ] not applicable     Interventions: To meet nutrition needs   Recommend  [ ] Change Diet To:  [ ] Nutrition Supplement  [ ] Nutrition Support  [X ] Other: Add Nephro-Luis daily as medically feasible      Monitoring and Evaluation:   [ X] PO intake [ x ] Tolerance to diet prescription [ x ] weights [ x ] labs[ x ] follow up per protocol  [ ] other:

## 2021-10-21 NOTE — PROGRESS NOTE ADULT - PROBLEM SELECTOR PLAN 6
Controlled  Pt takes Lantus at home  Continue sliding scale insulin coverage  Continue glucose monitoring  Continue low carb diet

## 2021-10-21 NOTE — PROGRESS NOTE ADULT - SUBJECTIVE AND OBJECTIVE BOX
Patient is seen and examined at the bed side, is afebrile. The COVID PCR from today, 10/21/21., is negative.        REVIEW OF SYSTEMS: All other review systems are negative      ALLERGIES: No Known Allergies      Vital Signs Last 24 Hrs  T(C): 36.2 (21 Oct 2021 13:43), Max: 36.5 (20 Oct 2021 20:27)  T(F): 97.2 (21 Oct 2021 13:43), Max: 97.7 (20 Oct 2021 20:27)  HR: 81 (21 Oct 2021 13:43) (77 - 97)  BP: 114/62 (21 Oct 2021 13:43) (112/69 - 131/71)  BP(mean): --  RR: 17 (21 Oct 2021 13:43) (17 - 18)  SpO2: 100% (21 Oct 2021 13:43) (100% - 100%)      PHYSICAL EXAM:  GENERAL: Not in distress  CHEST/LUNG:  Not using accessory muscles, s/p removal of Right CT   HEART: s1 and s2 present  ABDOMEN:  Mild distended   EXTREMITIES: Right foot bandage in placed   CNS: Awake and alert       LABS:                        9.6    4.77  )-----------( 276      ( 21 Oct 2021 04:56 )             29.6                           9.7    4.07  )-----------( 245      ( 20 Oct 2021 07:22 )             30.7       10-21    142  |  109<H>  |  11  ----------------------------<  106<H>  3.8   |  28  |  0.84    Ca    9.0      21 Oct 2021 04:56    TPro  6.8  /  Alb  2.4<L>  /  TBili  0.7  /  DBili  x   /  AST  31  /  ALT  50  /  AlkPhos  130<H>  10-21    10-20    143  |  109<H>  |  11  ----------------------------<  110<H>  3.8   |  27  |  1.00    Ca    9.2      20 Oct 2021 07:22  Phos  3.4     10-19  Mg     2.1     10-19    TPro  6.8  /  Alb  2.5<L>  /  TBili  0.6  /  DBili  x   /  AST  32  /  ALT  54  /  AlkPhos  129<H>  10-20    09-25    139  |  107  |  41<H>  ----------------------------<  134<H>  4.1   |  21<L>  |  4.05<H>    Ca    8.6      25 Sep 2021 06:40    TPro  6.6  /  Alb  2.3<L>  /  TBili  0.5  /  DBili  x   /  AST  25  /  ALT  15  /  AlkPhos  102  09-25        CAPILLARY BLOOD GLUCOSE  POCT Blood Glucose.: 134 mg/dL (17 Sep 2021 17:11)  POCT Blood Glucose.: 128 mg/dL (17 Sep 2021 11:06)  POCT Blood Glucose.: 157 mg/dL (17 Sep 2021 04:10)      Vancomycin Level, Trough (10.14.21 @ 11:32) : 21.8  Vancomycin Level, Trough (10.12.21 @ 12:13)   Vancomycin Level, Trough: 19.5:      MEDICATIONS  (STANDING):    apixaban 5 milliGRAM(s) Oral two times a day  aspirin  chewable 81 milliGRAM(s) Oral daily  atorvastatin 80 milliGRAM(s) Oral at bedtime  chlorhexidine 2% Cloths 1 Application(s) Topical <User Schedule>  collagenase Ointment 1 Application(s) Topical daily  cyanocobalamin 1000 MICROGram(s) Oral daily  ergocalciferol 13597 Unit(s) Oral <User Schedule>  ferrous    sulfate Liquid 300 milliGRAM(s) Enteral Tube daily  finasteride 5 milliGRAM(s) Oral daily  gabapentin 100 milliGRAM(s) Oral three times a day  insulin lispro (ADMELOG) corrective regimen sliding scale   SubCutaneous every 6 hours  metoprolol tartrate 25 milliGRAM(s) Oral two times a day  NIFEdipine XL 60 milliGRAM(s) Oral daily  pantoprazole  Injectable 40 milliGRAM(s) IV Push at bedtime  piperacillin/tazobactam IVPB.. 3.375 Gram(s) IV Intermittent every 8 hours        RADIOLOGY & ADDITIONAL TESTS:    10/5/21 CT Chest No Cont (10.05.21 @ 14:07) Pulmonary edema with bilateral moderate to large pleural effusions. Underlying bilateral lower lobe compressive atelectasis versus pneumonia.  Mildly enlarged mediastinal lymph nodes, possibly reactive.      10/2/21: Xray Chest 1 View- PORTABLE-Routine (Xray Chest 1 View- PORTABLE-Routine in AM.) (10.02.21 @ 09:46) There is persistent bilateral perihilar/basilar diffuse airspace disease and/or RIGHT effusion.  Cardiomegaly.  No interval change.    Collected Date/Time: 9/22/2021 08:42 EDT   Received Date/Time: 9/23/2021 09:29 EDT   Surgical Pathology Report - Auth (Verified)   Specimen(s) Submitted   1 Right 5th Toe Wound Debridement r/o Osteomyelitis   Final Diagnosis   Right fifth toe; wound debridement:   - Bone with acute osteomyelitis and necrotic periosteal tissue.     9/28/21: US Abdomen Upper Quadrant Right (09.28.21 @ 12:13) Cholelithiasis without evidence of acute cholecystitis. Note is made of right pleural effusion    9/27/21: CT Abdomen and Pelvis w/ Oral Cont (09.27.21 @ 15:53) >No acute intra-abdominal pathology.    Small bilateral pleural effusions with compressive atelectasis of both lower lobes.    9/26/21: Xray Chest 1 View-PORTABLE IMMEDIATE (Xray Chest 1 View-PORTABLE IMMEDIATE .) (09.26.21 @ 15:28) : Small bilateral pleural effusions and mild pulmonary edema. A right lower lobe pneumonia is not excluded.      9/26/21: Xray Abdomen 1 View Portable, IMMEDIATE (Xray Abdomen 1 View Portable, IMMEDIATE .) (09.26.21 @ 15:28) : There is a nasogastric tube with its tip in the stomach. There is gaseous distention of the colon. No pathologic calcifications are seen. The osseous structures are intact with degenerative change is present in the spine.      9/17/21 : MR Foot No Cont, Right (09.17.21 @ 13:04) : Resection at the mid aspect of the fifth metatarsal. Lateral soft tissue wound with marrow signal abnormality throughout the fifth metatarsal and within the adjacent fourth metatarsal head which is nonspecific in the setting of recent surgery although osteomyelitis is suspected.    9/16/21 : Xray Foot AP + Lateral + Oblique, Right (09.16.21 @ 15:03) : No acute finding. No plain film evidence of osteomyelitis.        MICROBIOLOGY DATA:    COVID-19 PCR (10.11.21 @ 05:44)   COVID-19 PCR: NotDetec:   Urine Microscopic-Add On (NC) (09.22.21 @ 16:14)   Red Blood Cell - Urine: 0-2 /HPF   White Blood Cell - Urine: 3-5 /HPF   Bacteria: Moderate /HPF   Comment - Urine: yeast cells present   Epithelial Cells: Moderate /HPF     Culture - Tissue with Gram Stain (09.22.21 @ 13:54)   Gram Stain: No polymorphonuclear cells seen per low power field   No organisms seen per oil power field   Specimen Source: .Tissue Right 5th toe r/o osteomyeltis   Culture Results: No growth     COVID-19 David Domain Antibody (09.18.21 @ 12:55)   COVID-19 David Domain Antibody Result: >250.00:    Culture - Blood (09.17.21 @ 10:28)   Specimen Source: .Blood Blood-Peripheral   Culture Results: No growth to date.     Culture - Blood (09.17.21 @ 10:28)   Specimen Source: .Blood Blood-Venous   Culture Results: No growth to date.     Culture - Surgical Swab (09.16.21 @ 21:42)   - Amikacin: S <=16   - Amikacin: S <=16   - Amoxicillin/Clavulanic Acid: S <=8/4   - Ampicillin: R >16 These ampicillin results predict results for amoxicillin   - Ampicillin: S <=2 Predicts results to ampicillin/sulbactam, amoxacillin-clavulanate and piperacillin-tazobactam.   - Ampicillin/Sulbactam: S 8/4 Enterobacter, Citrobacter, and Serratia may develop resistance during prolonged therapy (3-4 days)   - Ampicillin/Sulbactam: R >16/8   - Aztreonam: S <=4   - Aztreonam: S <=4   - Cefazolin: R 16 Enterobacter, Citrobacter, and Serratia may develop resistance during prolonged therapy (3-4 days)   - Cefazolin: R >16   - Cefepime: S <=2   - Cefepime: S <=2   - Cefoxitin: S <=8   - Cefoxitin: S <=8   - Ceftazidime: S <=1   - Ceftriaxone: S <=1   - Ceftriaxone: S <=1 Enterobacter, Citrobacter, and Serratia may develop resistance during prolonged therapy   - Ciprofloxacin: S <=0.25   - Ciprofloxacin: S <=0.25   - Ertapenem: S <=0.5   - Gentamicin: S <=2   - Gentamicin: S <=2   - Imipenem: S <=1   - Levofloxacin: S <=0.5   - Levofloxacin: S <=0.5   - Meropenem: S <=1   - Meropenem: S <=1   - Piperacillin/Tazobactam: S <=8   - Piperacillin/Tazobactam: S <=8   - Tetra/Doxy: S 4   - Tobramycin: S <=2   - Trimethoprim/Sulfamethoxazole: S <=0.5/9.5   - Trimethoprim/Sulfamethoxazole: S <=0.5/9.5   - Vancomycin: S 2   Specimen Source: .Surgical Swab right foot wound   Culture Results:   Culture yields >4 types of aerobic and/or anaerobic bacteria   Call client services within 7 days if further workup is clinically   indicated. Culture includes   Rare Aeromonas hydrophila/caviae   Few Klebsiella oxytoca/Raoutella ornithinolytica   Few Enterococcus faecalis   Few Streptococcus agalactiae (Group B) isolated   Group B streptococci are susceptible to ampicillin,   penicillin and cefazolin, but may be resistant to   erythromycin and clindamycin.   Recommendations for intrapartum prophylaxis for Group B   streptococci are penicillin or ampicillin.   Organism Identification: Aeromonas hydrophila/caviae   Klebsiella oxytoca /Raoutella ornithinolytica   Enterococcus faecalis   Organism: Aeromonas hydrophila/caviae   Organism: Klebsiella oxytoca /Raoutella ornithinolytica   Organism: Enterococcus faecalis   COVID-19 PCR . (09.16.21 @ 22:24)   COVID-19 PCR: NotDetec:

## 2021-10-21 NOTE — PROGRESS NOTE ADULT - ASSESSMENT
A:   s/p R 5th ray resection - 8/19  s/p R 5th wound debridement, graft application, bone biopsy and wound vac application 9/22       P:  Pt seen and evaluated   Right foot wound evaluated  Applied adaptic and santyl to the right dorsal wound  Applied adaptic and santyl to the lateral aspect of the right foot   Dressed right foot with DSD   Continue abx per ID recommendation  Continue offloading lower extremities in CAIR boots   Pod plan: continue with LWC  WCO: santyl, 4x4 gauze, rebecca and ACE to the right foot   Will follow while in house  Discussed with attending Dr. Vazquez

## 2021-10-21 NOTE — PROGRESS NOTE ADULT - PROBLEM SELECTOR PLAN 8
OREN resolved  SCr 0.84  Failed TOV- will need to continue ibrahim and outpatient f/u with Urology  closely monitor SCr, as patient is restarted on Zosyn

## 2021-10-21 NOTE — PROGRESS NOTE ADULT - PROBLEM SELECTOR PLAN 1
MRI noted above  Wound culture grew Enterococcus Aeromonal and Klebsiella  Continue Zosyn until 11/3  PICC in place  Podiatry dressing change  Dr. Barrett, ID, following   Podiatry following

## 2021-10-21 NOTE — PROGRESS NOTE ADULT - SUBJECTIVE AND OBJECTIVE BOX
HPI:  76 YOM admitted with OM.  On ABX until 11/3, PICC placed.  [facility]    OVERNIGHT EVENTS:  No new overnight events.  Seen and examined at bedside.     REVIEW OF SYSTEMS:      CONSTITUTIONAL: No fever  EYES: no acute visual disturbances  NECK: No pain or stiffness  RESPIRATORY: No cough; No shortness of breath  CARDIOVASCULAR: No chest pain, no palpitations  GASTROINTESTINAL: No pain. No nausea, vomiting or diarrhea   NEUROLOGICAL: No headache or numbness, no tremors  MUSCULOSKELETAL: No joint pain, no muscle pain  GENITOURINARY: no dysuria, no frequency, no hesitancy  PSYCHIATRY: no depression, no anxiety  ALL OTHER  ROS negative        Vital Signs Last 24 Hrs  T(C): 36.4 (21 Oct 2021 04:45), Max: 36.5 (20 Oct 2021 20:27)  T(F): 97.5 (21 Oct 2021 04:45), Max: 97.7 (20 Oct 2021 20:27)  HR: 97 (21 Oct 2021 04:45) (77 - 97)  BP: 112/69 (21 Oct 2021 04:45) (112/69 - 144/72)  BP(mean): --  RR: 18 (21 Oct 2021 04:45) (18 - 18)  SpO2: 100% (21 Oct 2021 04:45) (100% - 100%)    ________________________________________________  PHYSICAL EXAM:    GENERAL: NAD  HEENT: Normocephalic; conjunctivae and sclerae clear;  NECK : supple, no JVD  CHEST/LUNG: Clear to auscultation; Nonlabored  HEART: S1 S2  regular  ABDOMEN: Soft, Nontender, Nondistended; Bowel sounds present  EXTREMITIES: no cyanosis; no LE edema; no calf tenderness  SKIN: right foot ulceration, perianal wound  NERVOUS SYSTEM:  Alert; no new deficits    _________________________________________________  CURRENT MEDICATIONS:    MEDICATIONS  (STANDING):  apixaban 5 milliGRAM(s) Oral two times a day  aspirin  chewable 81 milliGRAM(s) Oral daily  atorvastatin 80 milliGRAM(s) Oral at bedtime  chlorhexidine 2% Cloths 1 Application(s) Topical <User Schedule>  collagenase Ointment 1 Application(s) Topical daily  cyanocobalamin 1000 MICROGram(s) Oral daily  ergocalciferol 69588 Unit(s) Oral <User Schedule>  ferrous    sulfate Liquid 300 milliGRAM(s) Enteral Tube daily  finasteride 5 milliGRAM(s) Oral daily  gabapentin 100 milliGRAM(s) Oral three times a day  insulin lispro (ADMELOG) corrective regimen sliding scale   SubCutaneous every 6 hours  metoprolol tartrate 25 milliGRAM(s) Oral two times a day  NIFEdipine XL 60 milliGRAM(s) Oral daily  pantoprazole  Injectable 40 milliGRAM(s) IV Push at bedtime  piperacillin/tazobactam IVPB.. 3.375 Gram(s) IV Intermittent every 8 hours    MEDICATIONS  (PRN):  acetaminophen   Tablet .. 650 milliGRAM(s) Oral every 6 hours PRN Temp greater or equal to 38C (100.4F), Moderate Pain (4 - 6)  ondansetron Injectable 4 milliGRAM(s) IV Push three times a day PRN Nausea and/or Vomiting  sodium chloride 0.9% lock flush 10 milliLiter(s) IV Push every 1 hour PRN Pre/post blood products, medications, blood draw, and to maintain line patency      __________________________________________________  LABS:                          9.6    4.77  )-----------( 276      ( 21 Oct 2021 04:56 )             29.6     10-21    142  |  109<H>  |  11  ----------------------------<  106<H>  3.8   |  28  |  0.84    Ca    9.0      21 Oct 2021 04:56    TPro  6.8  /  Alb  2.4<L>  /  TBili  0.7  /  DBili  x   /  AST  31  /  ALT  50  /  AlkPhos  130<H>  10-21        CAPILLARY BLOOD GLUCOSE      POCT Blood Glucose.: 118 mg/dL (21 Oct 2021 07:04)  POCT Blood Glucose.: 110 mg/dL (21 Oct 2021 00:10)  POCT Blood Glucose.: 118 mg/dL (20 Oct 2021 21:07)  POCT Blood Glucose.: 126 mg/dL (20 Oct 2021 16:31)  POCT Blood Glucose.: 93 mg/dL (20 Oct 2021 11:51)      __________________________________________________  RADIOLOGY & ADDITIONAL TESTS:    Imaging Personally Reviewed:  YES    < from: MR Foot No Cont, Right (09.17.21 @ 13:04) >  Impression:  Resection at the mid aspect of the fifth metatarsal. Lateral soft tissue wound with marrow signal abnormality throughout the fifth metatarsal and within the adjacent fourth metatarsal head which is nonspecific in the setting of recent surgery although osteomyelitis is suspected.    < end of copied text >    < from: Xray Chest 1 View- PORTABLE-Urgent (Xray Chest 1 View- PORTABLE-Urgent .) (10.15.21 @ 11:43) >  IMPRESSION: Right chest pigtail catheter has been removed. There is no discernible pneumothorax or other interval change in the exam.    < end of copied text >    Consultant(s) Notes Reviewed:   YES     Plan of care was discussed with patient and /or primary care giver; all questions and concerns were addressed and care was aligned with patient's wishes.    Plan discussed with attending and consulting physicians.

## 2021-10-21 NOTE — PROGRESS NOTE ADULT - SUBJECTIVE AND OBJECTIVE BOX
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 s/p OR debridement today. Nephrology consulted for abrupt rise in SCr.   s/p ibrahim placement for distended bladder on 9/23 with ~400ml of urine o/p  9/27- pt with SOB; CXR with pulmonary edema- pt given Lasix 80mg IV x1. Concern for aspiration PNA  Course BP: s/p lasix 60mg IV on 10/1 & 10/2 and s/p Lasix 40mg IV x1 today 10/4. Bladder scan with 1L urine; s/p ibrahim reinserted  Transferred to ICU for acute hypoxic respiratory failure, s/p intubated on 10/7, s/p extubated 10/13  s/p Rt pig tail removal   Failed TOV on 10/18; ibrahim reinserted    Hospital Medications: Medications reviewed.  REVIEW OF SYSTEMS: Pt denies any SOB, chest pain, n/v/d, abd pain or LE edema +b/l foot pain/ buttock pain    VITALS:  T(F): 97.2 (10-21-21 @ 13:43), Max: 97.7 (10-20-21 @ 20:27)  HR: 81 (10-21-21 @ 13:43)  BP: 114/62 (10-21-21 @ 13:43)  RR: 17 (10-21-21 @ 13:43)  SpO2: 100% (10-21-21 @ 13:43)  Wt(kg): --    10-20 @ 07:01  -  10-21 @ 07:00  --------------------------------------------------------  IN: 0 mL / OUT: 1600 mL / NET: -1600 mL        PHYSICAL EXAM:  Gen: NAD  Cards: RRR, +S1/S2, +TRINIDAD  Resp: mild rales at bases  GI: soft,   : +ibrahim  Extremities: no LE edema B/L  Derm: Rt foot wrapped     LABS:  10-21    142  |  109<H>  |  11  ----------------------------<  106<H>  3.8   |  28  |  0.84    Ca    9.0      21 Oct 2021 04:56    TPro  6.8  /  Alb  2.4<L>  /  TBili  0.7  /  DBili      /  AST  31  /  ALT  50  /  AlkPhos  130<H>  10-21    Creatinine Trend: 0.84 <--, 1.00 <--, 0.97 <--, 0.97 <--, 1.05 <--, 0.97 <--, 0.99 <--                        9.6    4.77  )-----------( 276      ( 21 Oct 2021 04:56 )             29.6     Urine Studies:

## 2021-10-22 LAB
A1C WITH ESTIMATED AVERAGE GLUCOSE RESULT: 6.6 % — HIGH (ref 4–5.6)
ANION GAP SERPL CALC-SCNC: 8 MMOL/L — SIGNIFICANT CHANGE UP (ref 5–17)
BUN SERPL-MCNC: 12 MG/DL — SIGNIFICANT CHANGE UP (ref 7–18)
CALCIUM SERPL-MCNC: 9.7 MG/DL — SIGNIFICANT CHANGE UP (ref 8.4–10.5)
CHLORIDE SERPL-SCNC: 105 MMOL/L — SIGNIFICANT CHANGE UP (ref 96–108)
CO2 SERPL-SCNC: 27 MMOL/L — SIGNIFICANT CHANGE UP (ref 22–31)
CREAT SERPL-MCNC: 1.07 MG/DL — SIGNIFICANT CHANGE UP (ref 0.5–1.3)
ESTIMATED AVERAGE GLUCOSE: 143 MG/DL — HIGH (ref 68–114)
GLUCOSE BLDC GLUCOMTR-MCNC: 104 MG/DL — HIGH (ref 70–99)
GLUCOSE BLDC GLUCOMTR-MCNC: 126 MG/DL — HIGH (ref 70–99)
GLUCOSE BLDC GLUCOMTR-MCNC: 134 MG/DL — HIGH (ref 70–99)
GLUCOSE BLDC GLUCOMTR-MCNC: 98 MG/DL — SIGNIFICANT CHANGE UP (ref 70–99)
GLUCOSE SERPL-MCNC: 101 MG/DL — HIGH (ref 70–99)
HCT VFR BLD CALC: 34 % — LOW (ref 39–50)
HGB BLD-MCNC: 11.7 G/DL — LOW (ref 13–17)
MAGNESIUM SERPL-MCNC: 2.3 MG/DL — SIGNIFICANT CHANGE UP (ref 1.6–2.6)
MCHC RBC-ENTMCNC: 31.1 PG — SIGNIFICANT CHANGE UP (ref 27–34)
MCHC RBC-ENTMCNC: 34.4 GM/DL — SIGNIFICANT CHANGE UP (ref 32–36)
MCV RBC AUTO: 90.4 FL — SIGNIFICANT CHANGE UP (ref 80–100)
NRBC # BLD: 0 /100 WBCS — SIGNIFICANT CHANGE UP (ref 0–0)
PHOSPHATE SERPL-MCNC: 3.5 MG/DL — SIGNIFICANT CHANGE UP (ref 2.5–4.5)
PLATELET # BLD AUTO: 330 K/UL — SIGNIFICANT CHANGE UP (ref 150–400)
POTASSIUM SERPL-MCNC: 4.3 MMOL/L — SIGNIFICANT CHANGE UP (ref 3.5–5.3)
POTASSIUM SERPL-SCNC: 4.3 MMOL/L — SIGNIFICANT CHANGE UP (ref 3.5–5.3)
RBC # BLD: 3.76 M/UL — LOW (ref 4.2–5.8)
RBC # FLD: 14.6 % — HIGH (ref 10.3–14.5)
SODIUM SERPL-SCNC: 140 MMOL/L — SIGNIFICANT CHANGE UP (ref 135–145)
WBC # BLD: 4.43 K/UL — SIGNIFICANT CHANGE UP (ref 3.8–10.5)
WBC # FLD AUTO: 4.43 K/UL — SIGNIFICANT CHANGE UP (ref 3.8–10.5)

## 2021-10-22 RX ORDER — SODIUM CHLORIDE 9 MG/ML
1000 INJECTION, SOLUTION INTRAVENOUS
Refills: 0 | Status: DISCONTINUED | OUTPATIENT
Start: 2021-10-22 | End: 2021-10-24

## 2021-10-22 RX ADMIN — PIPERACILLIN AND TAZOBACTAM 25 GRAM(S): 4; .5 INJECTION, POWDER, LYOPHILIZED, FOR SOLUTION INTRAVENOUS at 06:10

## 2021-10-22 RX ADMIN — PANTOPRAZOLE SODIUM 40 MILLIGRAM(S): 20 TABLET, DELAYED RELEASE ORAL at 22:17

## 2021-10-22 RX ADMIN — GABAPENTIN 100 MILLIGRAM(S): 400 CAPSULE ORAL at 06:10

## 2021-10-22 RX ADMIN — ATORVASTATIN CALCIUM 80 MILLIGRAM(S): 80 TABLET, FILM COATED ORAL at 22:17

## 2021-10-22 RX ADMIN — GABAPENTIN 100 MILLIGRAM(S): 400 CAPSULE ORAL at 22:17

## 2021-10-22 RX ADMIN — APIXABAN 5 MILLIGRAM(S): 2.5 TABLET, FILM COATED ORAL at 17:22

## 2021-10-22 RX ADMIN — GABAPENTIN 100 MILLIGRAM(S): 400 CAPSULE ORAL at 14:50

## 2021-10-22 RX ADMIN — FINASTERIDE 5 MILLIGRAM(S): 5 TABLET, FILM COATED ORAL at 12:31

## 2021-10-22 RX ADMIN — Medication 60 MILLIGRAM(S): at 06:10

## 2021-10-22 RX ADMIN — APIXABAN 5 MILLIGRAM(S): 2.5 TABLET, FILM COATED ORAL at 06:10

## 2021-10-22 RX ADMIN — SODIUM CHLORIDE 75 MILLILITER(S): 9 INJECTION, SOLUTION INTRAVENOUS at 16:44

## 2021-10-22 RX ADMIN — CHLORHEXIDINE GLUCONATE 1 APPLICATION(S): 213 SOLUTION TOPICAL at 06:10

## 2021-10-22 RX ADMIN — Medication 25 MILLIGRAM(S): at 06:10

## 2021-10-22 RX ADMIN — Medication 81 MILLIGRAM(S): at 12:31

## 2021-10-22 RX ADMIN — Medication 300 MILLIGRAM(S): at 12:30

## 2021-10-22 RX ADMIN — Medication 1 APPLICATION(S): at 12:31

## 2021-10-22 RX ADMIN — PIPERACILLIN AND TAZOBACTAM 25 GRAM(S): 4; .5 INJECTION, POWDER, LYOPHILIZED, FOR SOLUTION INTRAVENOUS at 14:50

## 2021-10-22 RX ADMIN — PIPERACILLIN AND TAZOBACTAM 25 GRAM(S): 4; .5 INJECTION, POWDER, LYOPHILIZED, FOR SOLUTION INTRAVENOUS at 22:17

## 2021-10-22 RX ADMIN — PREGABALIN 1000 MICROGRAM(S): 225 CAPSULE ORAL at 12:31

## 2021-10-22 NOTE — PROGRESS NOTE ADULT - ASSESSMENT
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 s/p OR debridement today. Nephrology consulted for abrupt rise in SCr.     1. OREN- Recurrent ATN in the setting of infection. Lisinopril discontinued 9/22. ATN resolving with good urine o/p. Now off diuretics; likely recovery phase. Pt with poor PO intake; ok to give gentle hydration for 12 hrs.    s/p CT chest with b/l mod to large pleural effusion R>L. s/p rt pigtail catheter (now removed). Monitor lytes  Kidney/ Bladder US with large distended bladder s/p ibrahim placement on 9/23, then removed. s/p bladder scan 10/4 with 1L urine for which ibrahim was reinserted; however; renal function did not improve post ibrahim; less likely due to obstructive uropathy.  Pt with had another TOV on 10/18 and failed; s/p ibrahim reinsertion; c/w finasteride; recc to add flomax. Recc outpt Urology f/u.   No peripheral eosinophilia- no signs of AIN. C3 & C4 wnl with neg ASO- no signs of PIGN. Strict I/Os. Avoid nephrotoxins/ NSAIDs/ RCA. Monitor BMP.    2. 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 Will defer secondary w/u as an outpt. Avoid nephrotoxins  3. HTN 2/2 CKD- BP borderline. Continue with current anti-hypertensive medications. Monitor BP.  4. Acute osteomyelitis- s/p debridement 9/22. Pt now on Zosyn. Plan as per ID      [facility] NEPHROLOGY  Bryn Johnson M.D.  BARTOLOME AyersO.  Zakia Rodriguez M.D.  Zonia Adams, MSN, ANP-C  [phone]    [id number] [id number]

## 2021-10-22 NOTE — PROGRESS NOTE ADULT - PROBLEM SELECTOR PLAN 11
Pt with COVID exposure, quarantine until 11/1  Pt will be medically stable for discharge s/p quarantine  Follow up PCR 10/28 & 10/30  PT recommending [facility]  Family has selected [facility]  Care management following for discharge planning

## 2021-10-22 NOTE — PROGRESS NOTE ADULT - SUBJECTIVE AND OBJECTIVE BOX
Patient is seen and examined at the bed side, is afebrile. The COVID PCR from today, 10/21/21., is negative.        REVIEW OF SYSTEMS: All other review systems are negative      ALLERGIES: No Known Allergies      Vital Signs Last 24 Hrs  T(C): 36.9 (22 Oct 2021 13:41), Max: 36.9 (22 Oct 2021 13:41)  T(F): 98.5 (22 Oct 2021 13:41), Max: 98.5 (22 Oct 2021 13:41)  HR: 65 (22 Oct 2021 17:21) (65 - 84)  BP: 95/62 (22 Oct 2021 17:21) (95/62 - 155/70)  BP(mean): --  RR: 17 (22 Oct 2021 13:41) (17 - 17)  SpO2: 100% (22 Oct 2021 13:41) (100% - 100%)      PHYSICAL EXAM:  GENERAL: Not in distress  CHEST/LUNG:  Not using accessory muscles, s/p removal of Right CT   HEART: s1 and s2 present  ABDOMEN:  Mild distended   EXTREMITIES: Right foot bandage in placed   CNS: Awake and alert       LABS:                        11.7   4.43  )-----------( 330      ( 22 Oct 2021 07:07 )             34.0                           9.6    4.77  )-----------( 276      ( 21 Oct 2021 04:56 )             29.6                10-22    140  |  105  |  12  ----------------------------<  101<H>  4.3   |  27  |  1.07    Ca    9.7      22 Oct 2021 07:07  Phos  3.5     10-22  Mg     2.3     10-22    TPro  6.8  /  Alb  2.4<L>  /  TBili  0.7  /  DBili  x   /  AST  31  /  ALT  50  /  AlkPhos  130<H>  10-21    10-21    142  |  109<H>  |  11  ----------------------------<  106<H>  3.8   |  28  |  0.84    Ca    9.0      21 Oct 2021 04:56    TPro  6.8  /  Alb  2.4<L>  /  TBili  0.7  /  DBili  x   /  AST  31  /  ALT  50  /  AlkPhos  130<H>  10-21 09-25    139  |  107  |  41<H>  ----------------------------<  134<H>  4.1   |  21<L>  |  4.05<H>    Ca    8.6      25 Sep 2021 06:40    TPro  6.6  /  Alb  2.3<L>  /  TBili  0.5  /  DBili  x   /  AST  25  /  ALT  15  /  AlkPhos  102  09-25        CAPILLARY BLOOD GLUCOSE  POCT Blood Glucose.: 134 mg/dL (17 Sep 2021 17:11)  POCT Blood Glucose.: 128 mg/dL (17 Sep 2021 11:06)  POCT Blood Glucose.: 157 mg/dL (17 Sep 2021 04:10)      Vancomycin Level, Trough (10.14.21 @ 11:32) : 21.8  Vancomycin Level, Trough (10.12.21 @ 12:13)   Vancomycin Level, Trough: 19.5:      MEDICATIONS  (STANDING):    apixaban 5 milliGRAM(s) Oral two times a day  aspirin  chewable 81 milliGRAM(s) Oral daily  atorvastatin 80 milliGRAM(s) Oral at bedtime  chlorhexidine 2% Cloths 1 Application(s) Topical <User Schedule>  collagenase Ointment 1 Application(s) Topical daily  cyanocobalamin 1000 MICROGram(s) Oral daily  dextrose 5% + sodium chloride 0.45%. 1000 milliLiter(s) (75 mL/Hr) IV Continuous <Continuous>  dextrose 5%. 1000 milliLiter(s) (100 mL/Hr) IV Continuous <Continuous>  ergocalciferol 16065 Unit(s) Oral <User Schedule>  ferrous    sulfate Liquid 300 milliGRAM(s) Enteral Tube daily  finasteride 5 milliGRAM(s) Oral daily  gabapentin 100 milliGRAM(s) Oral three times a day  glucagon  Injectable 1 milliGRAM(s) IntraMuscular once  insulin lispro (ADMELOG) corrective regimen sliding scale   SubCutaneous Before meals and at bedtime  metoprolol tartrate 25 milliGRAM(s) Oral two times a day  NIFEdipine XL 60 milliGRAM(s) Oral daily  pantoprazole  Injectable 40 milliGRAM(s) IV Push at bedtime  piperacillin/tazobactam IVPB.. 3.375 Gram(s) IV Intermittent every 8 hours        RADIOLOGY & ADDITIONAL TESTS:    10/5/21 CT Chest No Cont (10.05.21 @ 14:07) Pulmonary edema with bilateral moderate to large pleural effusions. Underlying bilateral lower lobe compressive atelectasis versus pneumonia.  Mildly enlarged mediastinal lymph nodes, possibly reactive.      10/2/21: Xray Chest 1 View- PORTABLE-Routine (Xray Chest 1 View- PORTABLE-Routine in AM.) (10.02.21 @ 09:46) There is persistent bilateral perihilar/basilar diffuse airspace disease and/or RIGHT effusion.  Cardiomegaly.  No interval change.    Collected Date/Time: 9/22/2021 08:42 EDT   Received Date/Time: 9/23/2021 09:29 EDT   Surgical Pathology Report - Auth (Verified)   Specimen(s) Submitted   1 Right 5th Toe Wound Debridement r/o Osteomyelitis   Final Diagnosis   Right fifth toe; wound debridement:   - Bone with acute osteomyelitis and necrotic periosteal tissue.     9/28/21: US Abdomen Upper Quadrant Right (09.28.21 @ 12:13) Cholelithiasis without evidence of acute cholecystitis. Note is made of right pleural effusion    9/27/21: CT Abdomen and Pelvis w/ Oral Cont (09.27.21 @ 15:53) >No acute intra-abdominal pathology.    Small bilateral pleural effusions with compressive atelectasis of both lower lobes.    9/26/21: Xray Chest 1 View-PORTABLE IMMEDIATE (Xray Chest 1 View-PORTABLE IMMEDIATE .) (09.26.21 @ 15:28) : Small bilateral pleural effusions and mild pulmonary edema. A right lower lobe pneumonia is not excluded.      9/26/21: Xray Abdomen 1 View Portable, IMMEDIATE (Xray Abdomen 1 View Portable, IMMEDIATE .) (09.26.21 @ 15:28) : There is a nasogastric tube with its tip in the stomach. There is gaseous distention of the colon. No pathologic calcifications are seen. The osseous structures are intact with degenerative change is present in the spine.      9/17/21 : MR Foot No Cont, Right (09.17.21 @ 13:04) : Resection at the mid aspect of the fifth metatarsal. Lateral soft tissue wound with marrow signal abnormality throughout the fifth metatarsal and within the adjacent fourth metatarsal head which is nonspecific in the setting of recent surgery although osteomyelitis is suspected.    9/16/21 : Xray Foot AP + Lateral + Oblique, Right (09.16.21 @ 15:03) : No acute finding. No plain film evidence of osteomyelitis.        MICROBIOLOGY DATA:    COVID-19 PCR (10.11.21 @ 05:44)   COVID-19 PCR: NotDetec:   Urine Microscopic-Add On (NC) (09.22.21 @ 16:14)   Red Blood Cell - Urine: 0-2 /HPF   White Blood Cell - Urine: 3-5 /HPF   Bacteria: Moderate /HPF   Comment - Urine: yeast cells present   Epithelial Cells: Moderate /HPF     Culture - Tissue with Gram Stain (09.22.21 @ 13:54)   Gram Stain: No polymorphonuclear cells seen per low power field   No organisms seen per oil power field   Specimen Source: .Tissue Right 5th toe r/o osteomyeltis   Culture Results: No growth     COVID-19 David Domain Antibody (09.18.21 @ 12:55)   COVID-19 David Domain Antibody Result: >250.00:    Culture - Blood (09.17.21 @ 10:28)   Specimen Source: .Blood Blood-Peripheral   Culture Results: No growth to date.     Culture - Blood (09.17.21 @ 10:28)   Specimen Source: .Blood Blood-Venous   Culture Results: No growth to date.     Culture - Surgical Swab (09.16.21 @ 21:42)   - Amikacin: S <=16   - Amikacin: S <=16   - Amoxicillin/Clavulanic Acid: S <=8/4   - Ampicillin: R >16 These ampicillin results predict results for amoxicillin   - Ampicillin: S <=2 Predicts results to ampicillin/sulbactam, amoxacillin-clavulanate and piperacillin-tazobactam.   - Ampicillin/Sulbactam: S 8/4 Enterobacter, Citrobacter, and Serratia may develop resistance during prolonged therapy (3-4 days)   - Ampicillin/Sulbactam: R >16/8   - Aztreonam: S <=4   - Aztreonam: S <=4   - Cefazolin: R 16 Enterobacter, Citrobacter, and Serratia may develop resistance during prolonged therapy (3-4 days)   - Cefazolin: R >16   - Cefepime: S <=2   - Cefepime: S <=2   - Cefoxitin: S <=8   - Cefoxitin: S <=8   - Ceftazidime: S <=1   - Ceftriaxone: S <=1   - Ceftriaxone: S <=1 Enterobacter, Citrobacter, and Serratia may develop resistance during prolonged therapy   - Ciprofloxacin: S <=0.25   - Ciprofloxacin: S <=0.25   - Ertapenem: S <=0.5   - Gentamicin: S <=2   - Gentamicin: S <=2   - Imipenem: S <=1   - Levofloxacin: S <=0.5   - Levofloxacin: S <=0.5   - Meropenem: S <=1   - Meropenem: S <=1   - Piperacillin/Tazobactam: S <=8   - Piperacillin/Tazobactam: S <=8   - Tetra/Doxy: S 4   - Tobramycin: S <=2   - Trimethoprim/Sulfamethoxazole: S <=0.5/9.5   - Trimethoprim/Sulfamethoxazole: S <=0.5/9.5   - Vancomycin: S 2   Specimen Source: .Surgical Swab right foot wound   Culture Results:   Culture yields >4 types of aerobic and/or anaerobic bacteria   Call client services within 7 days if further workup is clinically   indicated. Culture includes   Rare Aeromonas hydrophila/caviae   Few Klebsiella oxytoca/Raoutella ornithinolytica   Few Enterococcus faecalis   Few Streptococcus agalactiae (Group B) isolated   Group B streptococci are susceptible to ampicillin,   penicillin and cefazolin, but may be resistant to   erythromycin and clindamycin.   Recommendations for intrapartum prophylaxis for Group B   streptococci are penicillin or ampicillin.   Organism Identification: Aeromonas hydrophila/caviae   Klebsiella oxytoca /Raoutella ornithinolytica   Enterococcus faecalis   Organism: Aeromonas hydrophila/caviae   Organism: Klebsiella oxytoca /Raoutella ornithinolytica   Organism: Enterococcus faecalis   COVID-19 PCR . (09.16.21 @ 22:24)   COVID-19 PCR: NotDetec:         Patient is seen and examined at the bed side, is afebrile. No new events.       REVIEW OF SYSTEMS: All other review systems are negative      ALLERGIES: No Known Allergies      Vital Signs Last 24 Hrs  T(C): 36.9 (22 Oct 2021 13:41), Max: 36.9 (22 Oct 2021 13:41)  T(F): 98.5 (22 Oct 2021 13:41), Max: 98.5 (22 Oct 2021 13:41)  HR: 65 (22 Oct 2021 17:21) (65 - 84)  BP: 95/62 (22 Oct 2021 17:21) (95/62 - 155/70)  BP(mean): --  RR: 17 (22 Oct 2021 13:41) (17 - 17)  SpO2: 100% (22 Oct 2021 13:41) (100% - 100%)      PHYSICAL EXAM:  GENERAL: Not in distress  CHEST/LUNG:  Not using accessory muscles, s/p removal of Right CT   HEART: s1 and s2 present  ABDOMEN:  Mild distended   EXTREMITIES: Right foot bandage in placed   CNS: Awake and alert       LABS:                        11.7   4.43  )-----------( 330      ( 22 Oct 2021 07:07 )             34.0                           9.6    4.77  )-----------( 276      ( 21 Oct 2021 04:56 )             29.6                10-22    140  |  105  |  12  ----------------------------<  101<H>  4.3   |  27  |  1.07    Ca    9.7      22 Oct 2021 07:07  Phos  3.5     10-22  Mg     2.3     10-22    TPro  6.8  /  Alb  2.4<L>  /  TBili  0.7  /  DBili  x   /  AST  31  /  ALT  50  /  AlkPhos  130<H>  10-21    10-21    142  |  109<H>  |  11  ----------------------------<  106<H>  3.8   |  28  |  0.84    Ca    9.0      21 Oct 2021 04:56    TPro  6.8  /  Alb  2.4<L>  /  TBili  0.7  /  DBili  x   /  AST  31  /  ALT  50  /  AlkPhos  130<H>  10-21 09-25    139  |  107  |  41<H>  ----------------------------<  134<H>  4.1   |  21<L>  |  4.05<H>    Ca    8.6      25 Sep 2021 06:40    TPro  6.6  /  Alb  2.3<L>  /  TBili  0.5  /  DBili  x   /  AST  25  /  ALT  15  /  AlkPhos  102  09-25        CAPILLARY BLOOD GLUCOSE  POCT Blood Glucose.: 134 mg/dL (17 Sep 2021 17:11)  POCT Blood Glucose.: 128 mg/dL (17 Sep 2021 11:06)  POCT Blood Glucose.: 157 mg/dL (17 Sep 2021 04:10)      Vancomycin Level, Trough (10.14.21 @ 11:32) : 21.8  Vancomycin Level, Trough (10.12.21 @ 12:13)   Vancomycin Level, Trough: 19.5:      MEDICATIONS  (STANDING):    apixaban 5 milliGRAM(s) Oral two times a day  aspirin  chewable 81 milliGRAM(s) Oral daily  atorvastatin 80 milliGRAM(s) Oral at bedtime  chlorhexidine 2% Cloths 1 Application(s) Topical <User Schedule>  collagenase Ointment 1 Application(s) Topical daily  cyanocobalamin 1000 MICROGram(s) Oral daily  dextrose 5% + sodium chloride 0.45%. 1000 milliLiter(s) (75 mL/Hr) IV Continuous <Continuous>  dextrose 5%. 1000 milliLiter(s) (100 mL/Hr) IV Continuous <Continuous>  ergocalciferol 16044 Unit(s) Oral <User Schedule>  ferrous    sulfate Liquid 300 milliGRAM(s) Enteral Tube daily  finasteride 5 milliGRAM(s) Oral daily  gabapentin 100 milliGRAM(s) Oral three times a day  glucagon  Injectable 1 milliGRAM(s) IntraMuscular once  insulin lispro (ADMELOG) corrective regimen sliding scale   SubCutaneous Before meals and at bedtime  metoprolol tartrate 25 milliGRAM(s) Oral two times a day  NIFEdipine XL 60 milliGRAM(s) Oral daily  pantoprazole  Injectable 40 milliGRAM(s) IV Push at bedtime  piperacillin/tazobactam IVPB.. 3.375 Gram(s) IV Intermittent every 8 hours        RADIOLOGY & ADDITIONAL TESTS:    10/5/21 CT Chest No Cont (10.05.21 @ 14:07) Pulmonary edema with bilateral moderate to large pleural effusions. Underlying bilateral lower lobe compressive atelectasis versus pneumonia.  Mildly enlarged mediastinal lymph nodes, possibly reactive.      10/2/21: Xray Chest 1 View- PORTABLE-Routine (Xray Chest 1 View- PORTABLE-Routine in AM.) (10.02.21 @ 09:46) There is persistent bilateral perihilar/basilar diffuse airspace disease and/or RIGHT effusion.  Cardiomegaly.  No interval change.    Collected Date/Time: 9/22/2021 08:42 EDT   Received Date/Time: 9/23/2021 09:29 EDT   Surgical Pathology Report - Auth (Verified)   Specimen(s) Submitted   1 Right 5th Toe Wound Debridement r/o Osteomyelitis   Final Diagnosis   Right fifth toe; wound debridement:   - Bone with acute osteomyelitis and necrotic periosteal tissue.     9/28/21: US Abdomen Upper Quadrant Right (09.28.21 @ 12:13) Cholelithiasis without evidence of acute cholecystitis. Note is made of right pleural effusion    9/27/21: CT Abdomen and Pelvis w/ Oral Cont (09.27.21 @ 15:53) >No acute intra-abdominal pathology.    Small bilateral pleural effusions with compressive atelectasis of both lower lobes.    9/26/21: Xray Chest 1 View-PORTABLE IMMEDIATE (Xray Chest 1 View-PORTABLE IMMEDIATE .) (09.26.21 @ 15:28) : Small bilateral pleural effusions and mild pulmonary edema. A right lower lobe pneumonia is not excluded.      9/26/21: Xray Abdomen 1 View Portable, IMMEDIATE (Xray Abdomen 1 View Portable, IMMEDIATE .) (09.26.21 @ 15:28) : There is a nasogastric tube with its tip in the stomach. There is gaseous distention of the colon. No pathologic calcifications are seen. The osseous structures are intact with degenerative change is present in the spine.      9/17/21 : MR Foot No Cont, Right (09.17.21 @ 13:04) : Resection at the mid aspect of the fifth metatarsal. Lateral soft tissue wound with marrow signal abnormality throughout the fifth metatarsal and within the adjacent fourth metatarsal head which is nonspecific in the setting of recent surgery although osteomyelitis is suspected.    9/16/21 : Xray Foot AP + Lateral + Oblique, Right (09.16.21 @ 15:03) : No acute finding. No plain film evidence of osteomyelitis.        MICROBIOLOGY DATA:    COVID-19 PCR (10.11.21 @ 05:44)   COVID-19 PCR: NotDetec:   Urine Microscopic-Add On (NC) (09.22.21 @ 16:14)   Red Blood Cell - Urine: 0-2 /HPF   White Blood Cell - Urine: 3-5 /HPF   Bacteria: Moderate /HPF   Comment - Urine: yeast cells present   Epithelial Cells: Moderate /HPF     Culture - Tissue with Gram Stain (09.22.21 @ 13:54)   Gram Stain: No polymorphonuclear cells seen per low power field   No organisms seen per oil power field   Specimen Source: .Tissue Right 5th toe r/o osteomyeltis   Culture Results: No growth     COVID-19 David Domain Antibody (09.18.21 @ 12:55)   COVID-19 David Domain Antibody Result: >250.00:    Culture - Blood (09.17.21 @ 10:28)   Specimen Source: .Blood Blood-Peripheral   Culture Results: No growth to date.     Culture - Blood (09.17.21 @ 10:28)   Specimen Source: .Blood Blood-Venous   Culture Results: No growth to date.     Culture - Surgical Swab (09.16.21 @ 21:42)   - Amikacin: S <=16   - Amikacin: S <=16   - Amoxicillin/Clavulanic Acid: S <=8/4   - Ampicillin: R >16 These ampicillin results predict results for amoxicillin   - Ampicillin: S <=2 Predicts results to ampicillin/sulbactam, amoxacillin-clavulanate and piperacillin-tazobactam.   - Ampicillin/Sulbactam: S 8/4 Enterobacter, Citrobacter, and Serratia may develop resistance during prolonged therapy (3-4 days)   - Ampicillin/Sulbactam: R >16/8   - Aztreonam: S <=4   - Aztreonam: S <=4   - Cefazolin: R 16 Enterobacter, Citrobacter, and Serratia may develop resistance during prolonged therapy (3-4 days)   - Cefazolin: R >16   - Cefepime: S <=2   - Cefepime: S <=2   - Cefoxitin: S <=8   - Cefoxitin: S <=8   - Ceftazidime: S <=1   - Ceftriaxone: S <=1   - Ceftriaxone: S <=1 Enterobacter, Citrobacter, and Serratia may develop resistance during prolonged therapy   - Ciprofloxacin: S <=0.25   - Ciprofloxacin: S <=0.25   - Ertapenem: S <=0.5   - Gentamicin: S <=2   - Gentamicin: S <=2   - Imipenem: S <=1   - Levofloxacin: S <=0.5   - Levofloxacin: S <=0.5   - Meropenem: S <=1   - Meropenem: S <=1   - Piperacillin/Tazobactam: S <=8   - Piperacillin/Tazobactam: S <=8   - Tetra/Doxy: S 4   - Tobramycin: S <=2   - Trimethoprim/Sulfamethoxazole: S <=0.5/9.5   - Trimethoprim/Sulfamethoxazole: S <=0.5/9.5   - Vancomycin: S 2   Specimen Source: .Surgical Swab right foot wound   Culture Results:   Culture yields >4 types of aerobic and/or anaerobic bacteria   Call client services within 7 days if further workup is clinically   indicated. Culture includes   Rare Aeromonas hydrophila/caviae   Few Klebsiella oxytoca/Raoutella ornithinolytica   Few Enterococcus faecalis   Few Streptococcus agalactiae (Group B) isolated   Group B streptococci are susceptible to ampicillin,   penicillin and cefazolin, but may be resistant to   erythromycin and clindamycin.   Recommendations for intrapartum prophylaxis for Group B   streptococci are penicillin or ampicillin.   Organism Identification: Aeromonas hydrophila/caviae   Klebsiella oxytoca /Raoutella ornithinolytica   Enterococcus faecalis   Organism: Aeromonas hydrophila/caviae   Organism: Klebsiella oxytoca /Raoutella ornithinolytica   Organism: Enterococcus faecalis   COVID-19 PCR . (09.16.21 @ 22:24)   COVID-19 PCR: NotDetec:

## 2021-10-22 NOTE — PROGRESS NOTE ADULT - SUBJECTIVE AND OBJECTIVE BOX
[facility] NEPHROLOGY- PROGRESS NOTE    Patient is a 75yo Male with DM on insulin, HTN, HLD, CAD, s/p R partial 5th ray amputation 8/19/2021 p/w R foot pain and foul drainage a/w diabetic foot ulcer suspicious for osteomyelitis. s/p OR debridement today. Nephrology consulted for abrupt rise in SCr.   s/p ibrahim placement for distended bladder on 9/23 with ~400ml of urine o/p  9/27- pt with SOB; CXR with pulmonary edema- pt given Lasix 80mg IV x1. Concern for aspiration PNA  Course BP: s/p lasix 60mg IV on 10/1 & 10/2 and s/p Lasix 40mg IV x1 today 10/4. Bladder scan with 1L urine; s/p ibrahim reinserted  Transferred to ICU for acute hypoxic respiratory failure, s/p intubated on 10/7, s/p extubated 10/13  s/p Rt pig tail removal   Failed TOV on 10/18; ibrahim reinserted    Hospital Medications: Medications reviewed.  REVIEW OF SYSTEMS: Pt denies any SOB, chest pain, n/v/d, abd pain or LE edema +b/l foot pain/ buttock pain  +decreased PO Intake    VITALS:  T(F): 98.5 (10-22-21 @ 13:41), Max: 98.5 (10-22-21 @ 13:41)  HR: 84 (10-22-21 @ 13:41)  BP: 146/66 (10-22-21 @ 13:41)  RR: 17 (10-22-21 @ 13:41)  SpO2: 100% (10-22-21 @ 13:41)  Wt(kg): --    10-21 @ 07:01  -  10-22 @ 07:00  --------------------------------------------------------  IN: 0 mL / OUT: 1450 mL / NET: -1450 mL      PHYSICAL EXAM:  Gen: NAD  Cards: RRR, +S1/S2, +TRINIDAD  Resp: Decreased at bases  GI: soft,   : +ibrahim  Extremities: no LE edema B/L  Derm: Rt foot wrapped     LABS:  10-22    140  |  105  |  12  ----------------------------<  101<H>  4.3   |  27  |  1.07    Ca    9.7      22 Oct 2021 07:07  Phos  3.5     10-22  Mg     2.3     10-22    TPro  6.8  /  Alb  2.4<L>  /  TBili  0.7  /  DBili      /  AST  31  /  ALT  50  /  AlkPhos  130<H>  10-21    Creatinine Trend: 1.07 <--, 0.84 <--, 1.00 <--, 0.97 <--, 0.97 <--, 1.05 <--, 0.97 <--                        11.7   4.43  )-----------( 330      ( 22 Oct 2021 07:07 )             34.0     Urine Studies:

## 2021-10-22 NOTE — PROGRESS NOTE ADULT - SUBJECTIVE AND OBJECTIVE BOX
Patient is a 76y old  Male who presents with a chief complaint of R Foot Pain (21 Oct 2021 19:26)    PATIENT IS SEEN AND EXAMINED IN MEDICAL FLOOR.  KEENANT [    ]    MADDY [   ]      GT [   ]    ALLERGIES:  No Known Allergies      Daily     Daily     VITALS:    Vital Signs Last 24 Hrs  T(C): 36.8 (22 Oct 2021 04:21), Max: 36.8 (21 Oct 2021 21:41)  T(F): 98.3 (22 Oct 2021 04:21), Max: 98.3 (21 Oct 2021 21:41)  HR: 79 (22 Oct 2021 04:21) (77 - 81)  BP: 155/70 (22 Oct 2021 04:21) (114/62 - 155/70)  BP(mean): --  RR: 17 (22 Oct 2021 04:21) (17 - 17)  SpO2: 100% (22 Oct 2021 04:21) (100% - 100%)    LABS:    CBC Full  -  ( 22 Oct 2021 07:07 )  WBC Count : 4.43 K/uL  RBC Count : 3.76 M/uL  Hemoglobin : 11.7 g/dL  Hematocrit : 34.0 %  Platelet Count - Automated : 330 K/uL  Mean Cell Volume : 90.4 fl  Mean Cell Hemoglobin : 31.1 pg  Mean Cell Hemoglobin Concentration : 34.4 gm/dL  Auto Neutrophil # : x  Auto Lymphocyte # : x  Auto Monocyte # : x  Auto Eosinophil # : x  Auto Basophil # : x  Auto Neutrophil % : x  Auto Lymphocyte % : x  Auto Monocyte % : x  Auto Eosinophil % : x  Auto Basophil % : x      10-22    140  |  105  |  12  ----------------------------<  101<H>  4.3   |  27  |  1.07    Ca    9.7      22 Oct 2021 07:07  Phos  3.5     10-22  Mg     2.3     10-22    TPro  6.8  /  Alb  2.4<L>  /  TBili  0.7  /  DBili  x   /  AST  31  /  ALT  50  /  AlkPhos  130<H>  10-21    CAPILLARY BLOOD GLUCOSE      POCT Blood Glucose.: 104 mg/dL (22 Oct 2021 11:21)  POCT Blood Glucose.: 98 mg/dL (22 Oct 2021 08:12)  POCT Blood Glucose.: 96 mg/dL (21 Oct 2021 21:17)  POCT Blood Glucose.: 109 mg/dL (21 Oct 2021 16:40)        LIVER FUNCTIONS - ( 21 Oct 2021 04:56 )  Alb: 2.4 g/dL / Pro: 6.8 g/dL / ALK PHOS: 130 U/L / ALT: 50 U/L DA / AST: 31 U/L / GGT: x           Creatinine Trend: 1.07<--, 0.84<--, 1.00<--, 0.97<--, 0.97<--, 1.05<--  I&O's Summary    21 Oct 2021 07:01  -  22 Oct 2021 07:00  --------------------------------------------------------  IN: 0 mL / OUT: 1450 mL / NET: -1450 mL            .Body Fluid Pleural Fluid  10-08 @ 18:51   No growth at 1 week.  --  --      .Body Fluid Pleural Fluid  10-08 @ 18:34   No growth at 5 days  --    polymorphonuclear leukocytes seen  No organisms seen  by cytocentrifuge      .Tissue Right 5th toe r/o osteomyeltis  09-22 @ 13:54   No growth at 5 days  --    No polymorphonuclear cells seen per low power field  No organisms seen per oil power field      .Blood Blood-Venous  09-17 @ 10:28   No Growth Final  --  --      .Surgical Swab right foot wound  09-16 @ 21:42   Culture yields >4 types of aerobic and/or anaerobic bacteria  Call client services within 7 days if further workup is clinically  indicated. Culture includes  Rare Aeromonas hydrophila/caviae  Few Klebsiella oxytoca/Raoutella ornithinolytica  Few Enterococcus faecalis  Few Streptococcus agalactiae (Group B) isolated  Group B streptococci are susceptible to ampicillin,  penicillin and cefazolin, but may be resistant to  erythromycin and clindamycin.  Recommendations for intrapartum prophylaxis for Group B  streptococci are penicillin or ampicillin.  --  Aeromonas hydrophila/caviae  Klebsiella oxytoca /Raoutella ornithinolytica  Enterococcus faecalis      Bone R 5th metatarsal bone clean ma  08-20 @ 03:59   No growth at 5 days  --    No polymorphonuclear leukocytes seen per low power field  No organisms seen per oil power field      .Blood Blood-Venous  08-14 @ 21:09   No Growth Final  --  --      .Surgical Swab Right foot plantar wound  08-14 @ 21:08   Moderate Streptococcus agalactiae (Group B) isolated  Group B streptococci are susceptible to ampicillin,  penicillin and cefazolin, but may be resistant to  erythromycin and clindamycin.  Recommendations for intrapartum prophylaxis for Group B  streptococci are penicillin or ampicillin.  Moderate Bacteroides fragilis "Susceptibilities not performed"  Normal skin filiberto isolated  --  --          MEDICATIONS:    MEDICATIONS  (STANDING):  apixaban 5 milliGRAM(s) Oral two times a day  aspirin  chewable 81 milliGRAM(s) Oral daily  atorvastatin 80 milliGRAM(s) Oral at bedtime  chlorhexidine 2% Cloths 1 Application(s) Topical <User Schedule>  collagenase Ointment 1 Application(s) Topical daily  cyanocobalamin 1000 MICROGram(s) Oral daily  dextrose 5% + sodium chloride 0.45%. 1000 milliLiter(s) (75 mL/Hr) IV Continuous <Continuous>  dextrose 5%. 1000 milliLiter(s) (100 mL/Hr) IV Continuous <Continuous>  ergocalciferol 65747 Unit(s) Oral <User Schedule>  ferrous    sulfate Liquid 300 milliGRAM(s) Enteral Tube daily  finasteride 5 milliGRAM(s) Oral daily  gabapentin 100 milliGRAM(s) Oral three times a day  glucagon  Injectable 1 milliGRAM(s) IntraMuscular once  insulin lispro (ADMELOG) corrective regimen sliding scale   SubCutaneous Before meals and at bedtime  metoprolol tartrate 25 milliGRAM(s) Oral two times a day  NIFEdipine XL 60 milliGRAM(s) Oral daily  pantoprazole  Injectable 40 milliGRAM(s) IV Push at bedtime  piperacillin/tazobactam IVPB.. 3.375 Gram(s) IV Intermittent every 8 hours      MEDICATIONS  (PRN):  acetaminophen   Tablet .. 650 milliGRAM(s) Oral every 6 hours PRN Temp greater or equal to 38C (100.4F), Moderate Pain (4 - 6)  ondansetron Injectable 4 milliGRAM(s) IV Push three times a day PRN Nausea and/or Vomiting  sodium chloride 0.9% lock flush 10 milliLiter(s) IV Push every 1 hour PRN Pre/post blood products, medications, blood draw, and to maintain line patency      REVIEW OF SYSTEMS:                           ALL ROS DONE [ X   ]    CONSTITUTIONAL:  LETHARGIC [   ], FEVER [   ], UNRESPONSIVE [   ]  CVS:  CP  [   ], SOB, [   ], PALPITATIONS [   ], DIZZYNESS [   ]  RS: COUGH [   ], SPUTUM [   ]  GI: ABDOMINAL PAIN [   ], NAUSEA [   ], VOMITINGS [   ], DIARRHEA [   ], CONSTIPATION [   ]  :  DYSURIA [   ], NOCTURIA [   ], INCREASED FREQUENCY [   ], DRIBLING [   ],  SKELETAL: PAINFUL JOINTS [   ], SWOLLEN JOINTS [   ], NECK ACHE [   ], LOW BACK ACHE [   ],  SKIN : ULCERS [   ], RASH [   ], ITCHING [   ]  CNS: HEAD ACHE [   ], DOUBLE VISION [   ], BLURRED VISION [   ], AMS / CONFUSION [   ], SEIZURES [   ], WEAKNESS [   ],TINGLING / NUMBNESS [   ]    PHYSICAL EXAMINATION:    GENERAL APPEARANCE: NO DISTRESS  HEENT:  NO PALLOR, NO  JVD,  NO   NODES, NECK SUPPLE  CVS: S1 +, S2 +,   RS: AEEB,  OCCASIONAL  RALES +,  RONCHI +, CRACKLES + [IMPROVING]   ABD: SOFT, NT, NO, BS +       EXT: NO PE  SKIN: WARM,   RIGHT FOOT WRAPPED  SKELETAL:  ROM ACCEPTABLE  CNS:  AAO X  2-3        RADIOLOGY :    < from: Transthoracic Echocardiogram (10.07.21 @ 15:26) >  CONCLUSIONS:  1. Normal mitral valve. Moderate mitral regurgitation.  2. Calcified aortic valve with decreased opening, cannot  exclude bicuspid. Peak transaortic valve gradient equals  32.4 mm Hg, mean transaortic valve gradient equals 19 mm  Hg, dimensionless index 0.22, estimated aortic valve area  equals 0.6sqcm (by continuity equation), consistent with  paradoxical low-flow low-gradient severe aortic stenosis.  Consider dobutamine stress echocardiogram and/or SABIHA for  further evaluation.  No aortic valve regurgitation seen.  3. Normal aortic root.  4. Normal left atrium.  5. Moderate concentric left ventricular hypertrophy.  6. Moderate global left ventricular systolic dysfunction  (EF 40-45% by visual estimation).  7. Grade II diastolic dysfunction (moderate).  8. Normal right atrium.  9. Normal right ventricular size with decreased RV systolic  function (TAPSE 1.2 cm).  10. RV systolic pressure is borderline normal at  34 mm Hg.  11. Tricuspid valve not well seen. Trace tricuspid  regurgitation.  12. Normal pulmonic valve. Trace pulmonic insufficiency is  noted.  13. No pericardial effusion.  14. Bilateral pleural effusions.    < end of copied text >      EXAM:  CT ABDOMEN AND PELVIS OC                            PROCEDURE DATE:  09/27/2021          INTERPRETATION:  CLINICAL INFORMATION: Small bowel obstruction.    COMPARISON: None.    CONTRAST/COMPLICATIONS:  IV Contrast: NONE  Oral Contrast: Omnipaque 300  Complications: None reported at time of study completion    PROCEDURE:  CT of the Abdomen and Pelvis was performed.  Sagittal and coronal reformats were performed.    FINDINGS:  LOWER CHEST: Small bilateral pleural effusions with compressiveatelectasis of both lower lobes.    LIVER: Within normal limits.  BILE DUCTS: Normal caliber.  GALLBLADDER: Distended. Small layering gallstones.  SPLEEN: Within normal limits.  PANCREAS: Within normal limits.  ADRENALS: Within normal limits.  KIDNEYS/URETERS: No hydronephrosis. Nonobstructing left upper pole calculus, measuring 0.6 cm.    BLADDER: Urinary bladder contains air and a Castañeda catheter balloon.  REPRODUCTIVE ORGANS: Prostate is not enlarged.    BOWEL: No bowel obstruction. Appendix isnormal. Colonic diverticulosis.  PERITONEUM: Mild presacral fluid.  VESSELS: Atherosclerotic changes.  RETROPERITONEUM/LYMPH NODES: No lymphadenopathy.  ABDOMINAL WALL: Within normal limits.  BONES: Degenerative changes. Sternotomy.    IMPRESSION:  No acute intra-abdominal pathology.    Small bilateral pleural effusions with compressive atelectasis of both lower lobes.    ====================================================    EXAM:  MR FOOT RT                            PROCEDURE DATE:  09/17/2021          INTERPRETATION:  Clinical Information: Recent fifth metatarsal head resection now with fifth digit pain, swelling and foul discharge.    Comparison: Radiographs of the right foot from 9/16/2021 and MRI the right foot from 8/16/2021.    Technique:  MRI of the right midfoot and forefoot.  Intravenous Contrast: None.    Findings:    There is a resection at the mid aspect of the fifth metatarsal shaft. There is a lateral soft tissue wound beginning at the level of the amputation which extends distally. There is susceptibility artifact in the region of the amputation consistent with postoperative change. There is hyperintense T2 marrow signal throughout the remaining fifth metatarsal and within the adjacent fourth metatarsal head which is nonspecific and could be related to recent postoperative changes although osteomyelitis is suspected.    There is edema and mild atrophy within the plantar muscles of the foot. There is minimal spurring at the first metatarsophalangeal and hallux sesamoid articulations.    Impression:  Resection at the mid aspect of the fifth metatarsal. Lateral soft tissue wound with marrow signal abnormality throughout the fifth metatarsal and within the adjacent fourth metatarsal head which is nonspecific in the setting of recent surgery although osteomyelitis is suspected.        ASSESSMENT :     Headache    HTN (hypertension)    HLD (hyperlipidemia)    DM (diabetes mellitus)    CAD (coronary artery disease)    Glaucoma, angle-closure    [place] (hard of hearing)      S/P CABG (coronary artery bypass graft)    History of thyroid surgery        PLAN:    HPI:  76M from home, lives with daughter w/ PMH DM on insulin, HTN, HLD, CAD, 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 Pt with sharp pain on the R lateral foot. As per daughter, pt was taking tylenol which did not help his pain prompting the patient to ask his daughter to take him to the hospital. Pt is a poor historian. Daughter states that the patient did not see his podiatrist after the surgery. No fever, chest, pain, palpitations, nausea, vomiting, diarrhea (17 Sep 2021 01:11)      # PATIENT IS S/P ICU MONITORING AND RIGHT CHEST TUBE REMOVAL ON 10/15/2021     # COVID EXPOSURE ON 10/13 - ON CONTACT AND DROPLET ISOLATION PRECAUTION; F/U COVID PCR    # SUSPECT RIGHT FOOT OSTEOMYELITIS S/P RIGHT 5TH RAY RESECTION [8/19] AND S/P DEBRIDEMENT, GRAFT APPLICATION, BONE BX AND WOUND VAC APPLICATION 9/22 - BONE BX W/ ACUTE OSTEOMYELITIS AND NECROTIC PERIOSTEAL TISSUE  ** RECENT ADMISSION FOR RIGHT DIABETIC FOOT ULCER, CELLULITIS, OSTEOMYELITIS RIGHT 5th METATARSAL S/P RIGHT 5TH PARTIAL RAY AMPUTATION [8/20] - BONE PATHOLOGY W/ CLEAN MARGINS    - S/P VANCOMYCIN AND ZOSYN ; NOW ON UNASYN AND LEVAQUIN GIVEN OREN; PER ID SWITCH TO ZOSYN UNTIL 11/3   - RECENTLY HAD SIMON W/ MILD PAD  - REVIEWED WOUND CX [ ENTEROCOCCUS, AEROMONAS, KLEBSIELLA] AND BCX  - ID CONSULT, PODIATRY CONSULT    - PICC LINE PLACED TO COMPLETE 6 WEEKS OF ANTIBIOTICS - ON ZOSYN UNTIL 11/3 ON D/C    # ACUTE HYPOXIC RESPIRATORY FAILURE DUE TO ACUTE ON CHRONIC SYSTOLIC CHF  (  LV EF 40- 45 % ) , DIASTOLIC LV DYSFUNCTION , ,  SEVERE AORTIC STENOSIS & MODERATE MITRAL REGURGITATION  &  ASPIRATION PNA;  - S/P CHEST TUBE INSERTION AND REMOVAL  , S/P LASIX ,   CARDIOLOGY CONSULT IN PROGRESS      - CHEST TUBE PLACED [10/8] - FLUID CONSISTENT WITH TRANSUDATIVE PROCESS  - S/P EXTUBATION 10/13  - S/P CHEST TUBE REMOVAL 10/15      # SEPTIC SHOCK - ? S/T HYPOVOLEMIA VS. SEPSIS - S/P CVC [SWITCHED FROM FEMORAL TO LEFT IJ], S/P VASOPRESSORS - RESOLVING  - BCX - NGTD  - ON MEROPENEM      # TRANSIENT NEW ONSET A.FIB, PAROXYSMAL - ON ELIQUIS, CARDIOLOGY CONSULT IN PROGRESS    # FUNGURIA - D/C FLUCONAZOLE GIVEN TRANSAMINITIS    # TRANSAMINITIS - SUSPECT THIS IS ISCHEMIC HEPATITIS - IMPROVING - HEPATOLOGY CONSULT IN PROGRESS    # BOWEL DISTENTION - ? ILEUS - OPTIMIZING ELECTROLYTES - IMPROVED  - SURGERY CONSULT IN PROGRESS    # CHOLELITHIASIS - TRENDING LFTS, RUQ U/S - CHOLELITHIASIS, S/P SURGERY CONSULT   - S/P GI CONSULT - LESS SUSPICIOUS FOR CHOLECYSTITIS    # DYSPEPSIA - PLACED ON PPI BID WITH IMPROVEMENT, UNDERWENT ST EVAL     # ELEVATED TROPONINS - NSTEMI - ? DEMAND - WILL NEED ISCHEMIC EVAL ONCE ACUTE INFECTION RESOLVES PER CARDIOLOGY, CARDIOLOGY CONSULT IN PROGRESS  - ON ASA, STATIN, BB    # OREN ON CKD - S/T ATN AND URINARY RETENTION - S/P IVF, NOW W/ CASTAÑEDA, NEPHROLOGY CONUSLT IN PROGRESS  -  BPH ON FLOMAX, AND FINASTERIDE  - FAILED TOV WITH WORSENING RENAL FXN - NOTED URINARY RETENTION [10/4] - REPLACED CASTAÑEDA  - TOV 10/18 - BLADDER SCAN BID TO EVALUATE FOR URINARY RETENTION - FAILED TOV  - OVERNIGHT 10/18 - CASTAÑEDA REPLACED    # MEDICAL CLEARANCE FOR SURGERY - RCRI - 2 - 10.1 % 30 DAY RISK OF DEATH, MI OR CARDIAC ARREST  - CARDIOLOGY CONSULT     # DM -  HBA1C - 11  - LANTUS, SSI + FS    # CAD S/P CABG - PLACED ON ASA, STATIN, BB  - ECHO - SEVERE AORTIC STENOSIS, SEVERE CONCENTRIC LVH, G1DD, MILD [place]  - CARDIOLOGY CONSULT - DR. BASSETT   - MAY NEED ISCHEMIC EVAL ONCE ACUTE ILLNESS RESOLVES INCLUDING CARDIAC CATHETERIZATION    # HTN, HLD  - ON METOPROLOL, NIFEDIPINE, STATIN       #  IMPAIRED GAIT DUE TO CERVICAL & LS SPONDYLOSIS, POLY ARTHRITIS AND DIABETIC PERIPHERAL NEUROPATHY, OP VITAMIN D DEFICIENCY - ON CHOLECALCIFEROL, OBTAIN PT & OT   #  FAILURE TO THRIVE , MODERATE PROTEIN CALORIE MALNUTRITION       # CASE DISCUSSED AT LENGTH WITH PATIENT AND DAUGHTER, BIRDIE ROBERT AT BEDSIDE AND VIA PHONE @ [phone] [10/5] - CASE DICUSSED AT LENGTH AND ALL QUESTIONS WERE ANSWERED. DAUGHTER UNDERSTOOD THAT PROGNOSIS IS GUARDED.  # PATIENT AND DAUGHTER REFUSED [facility] ON PREVIOUS ADMISSION, PREVIOUSLY WISHED FOR PATIENT RETURN HOME W/ HOME PT; HOWEVER NOW, DAUGHTER WISHES FOR PATIENT TO GO TO [facility], AS PATIENT'S WIFE IS CURRENTLY ADMITTED THERE    # GI AND DVT PPX   Patient is a 76y old  Male who presents with a chief complaint of R Foot Pain (21 Oct 2021 19:26)    PATIENT IS SEEN AND EXAMINED IN MEDICAL FLOOR.    ALLERGIES:  No Known Allergies    VITALS:    Vital Signs Last 24 Hrs  T(C): 36.8 (22 Oct 2021 04:21), Max: 36.8 (21 Oct 2021 21:41)  T(F): 98.3 (22 Oct 2021 04:21), Max: 98.3 (21 Oct 2021 21:41)  HR: 79 (22 Oct 2021 04:21) (77 - 81)  BP: 155/70 (22 Oct 2021 04:21) (114/62 - 155/70)  BP(mean): --  RR: 17 (22 Oct 2021 04:21) (17 - 17)  SpO2: 100% (22 Oct 2021 04:21) (100% - 100%)    LABS:    CBC Full  -  ( 22 Oct 2021 07:07 )  WBC Count : 4.43 K/uL  RBC Count : 3.76 M/uL  Hemoglobin : 11.7 g/dL  Hematocrit : 34.0 %  Platelet Count - Automated : 330 K/uL  Mean Cell Volume : 90.4 fl  Mean Cell Hemoglobin : 31.1 pg  Mean Cell Hemoglobin Concentration : 34.4 gm/dL  Auto Neutrophil # : x  Auto Lymphocyte # : x  Auto Monocyte # : x  Auto Eosinophil # : x  Auto Basophil # : x  Auto Neutrophil % : x  Auto Lymphocyte % : x  Auto Monocyte % : x  Auto Eosinophil % : x  Auto Basophil % : x      10-22    140  |  105  |  12  ----------------------------<  101<H>  4.3   |  27  |  1.07    Ca    9.7      22 Oct 2021 07:07  Phos  3.5     10-22  Mg     2.3     10-22    TPro  6.8  /  Alb  2.4<L>  /  TBili  0.7  /  DBili  x   /  AST  31  /  ALT  50  /  AlkPhos  130<H>  10-21    CAPILLARY BLOOD GLUCOSE      POCT Blood Glucose.: 104 mg/dL (22 Oct 2021 11:21)  POCT Blood Glucose.: 98 mg/dL (22 Oct 2021 08:12)  POCT Blood Glucose.: 96 mg/dL (21 Oct 2021 21:17)  POCT Blood Glucose.: 109 mg/dL (21 Oct 2021 16:40)        LIVER FUNCTIONS - ( 21 Oct 2021 04:56 )  Alb: 2.4 g/dL / Pro: 6.8 g/dL / ALK PHOS: 130 U/L / ALT: 50 U/L DA / AST: 31 U/L / GGT: x           Creatinine Trend: 1.07<--, 0.84<--, 1.00<--, 0.97<--, 0.97<--, 1.05<--  I&O's Summary    21 Oct 2021 07:01  -  22 Oct 2021 07:00  --------------------------------------------------------  IN: 0 mL / OUT: 1450 mL / NET: -1450 mL      .Body Fluid Pleural Fluid  10-08 @ 18:51   No growth at 1 week.  --  --      .Body Fluid Pleural Fluid  10-08 @ 18:34   No growth at 5 days  --    polymorphonuclear leukocytes seen  No organisms seen  by cytocentrifuge      .Tissue Right 5th toe r/o osteomyeltis  09-22 @ 13:54   No growth at 5 days  --    No polymorphonuclear cells seen per low power field  No organisms seen per oil power field      .Blood Blood-Venous  09-17 @ 10:28   No Growth Final  --  --      .Surgical Swab right foot wound  09-16 @ 21:42   Culture yields >4 types of aerobic and/or anaerobic bacteria  Call client services within 7 days if further workup is clinically  indicated. Culture includes  Rare Aeromonas hydrophila/caviae  Few Klebsiella oxytoca/Raoutella ornithinolytica  Few Enterococcus faecalis  Few Streptococcus agalactiae (Group B) isolated  Group B streptococci are susceptible to ampicillin,  penicillin and cefazolin, but may be resistant to  erythromycin and clindamycin.  Recommendations for intrapartum prophylaxis for Group B  streptococci are penicillin or ampicillin.  --  Aeromonas hydrophila/caviae  Klebsiella oxytoca /Raoutella ornithinolytica  Enterococcus faecalis      Bone R 5th metatarsal bone clean ma  08-20 @ 03:59   No growth at 5 days  --    No polymorphonuclear leukocytes seen per low power field  No organisms seen per oil power field      .Blood Blood-Venous  08-14 @ 21:09   No Growth Final  --  --      .Surgical Swab Right foot plantar wound  08-14 @ 21:08   Moderate Streptococcus agalactiae (Group B) isolated  Group B streptococci are susceptible to ampicillin,  penicillin and cefazolin, but may be resistant to  erythromycin and clindamycin.  Recommendations for intrapartum prophylaxis for Group B  streptococci are penicillin or ampicillin.  Moderate Bacteroides fragilis "Susceptibilities not performed"  Normal skin filiberto isolated  --  --          MEDICATIONS:    MEDICATIONS  (STANDING):  apixaban 5 milliGRAM(s) Oral two times a day  aspirin  chewable 81 milliGRAM(s) Oral daily  atorvastatin 80 milliGRAM(s) Oral at bedtime  chlorhexidine 2% Cloths 1 Application(s) Topical <User Schedule>  collagenase Ointment 1 Application(s) Topical daily  cyanocobalamin 1000 MICROGram(s) Oral daily  dextrose 5% + sodium chloride 0.45%. 1000 milliLiter(s) (75 mL/Hr) IV Continuous <Continuous>  dextrose 5%. 1000 milliLiter(s) (100 mL/Hr) IV Continuous <Continuous>  ergocalciferol 80025 Unit(s) Oral <User Schedule>  ferrous    sulfate Liquid 300 milliGRAM(s) Enteral Tube daily  finasteride 5 milliGRAM(s) Oral daily  gabapentin 100 milliGRAM(s) Oral three times a day  glucagon  Injectable 1 milliGRAM(s) IntraMuscular once  insulin lispro (ADMELOG) corrective regimen sliding scale   SubCutaneous Before meals and at bedtime  metoprolol tartrate 25 milliGRAM(s) Oral two times a day  NIFEdipine XL 60 milliGRAM(s) Oral daily  pantoprazole  Injectable 40 milliGRAM(s) IV Push at bedtime  piperacillin/tazobactam IVPB.. 3.375 Gram(s) IV Intermittent every 8 hours      MEDICATIONS  (PRN):  acetaminophen   Tablet .. 650 milliGRAM(s) Oral every 6 hours PRN Temp greater or equal to 38C (100.4F), Moderate Pain (4 - 6)  ondansetron Injectable 4 milliGRAM(s) IV Push three times a day PRN Nausea and/or Vomiting  sodium chloride 0.9% lock flush 10 milliLiter(s) IV Push every 1 hour PRN Pre/post blood products, medications, blood draw, and to maintain line patency      REVIEW OF SYSTEMS:                           ALL ROS DONE [ X   ]    CONSTITUTIONAL:  LETHARGIC [   ], FEVER [   ], UNRESPONSIVE [   ]  CVS:  CP  [   ], SOB, [   ], PALPITATIONS [   ], DIZZYNESS [   ]  RS: COUGH [   ], SPUTUM [   ]  GI: ABDOMINAL PAIN [   ], NAUSEA [   ], VOMITINGS [   ], DIARRHEA [   ], CONSTIPATION [   ]  :  DYSURIA [   ], NOCTURIA [   ], INCREASED FREQUENCY [   ], DRIBLING [   ],  SKELETAL: PAINFUL JOINTS [   ], SWOLLEN JOINTS [   ], NECK ACHE [   ], LOW BACK ACHE [   ],  SKIN : ULCERS [   ], RASH [   ], ITCHING [   ]  CNS: HEAD ACHE [   ], DOUBLE VISION [   ], BLURRED VISION [   ], AMS / CONFUSION [   ], SEIZURES [   ], WEAKNESS [   ],TINGLING / NUMBNESS [   ]    PHYSICAL EXAMINATION:    GENERAL APPEARANCE: NO DISTRESS  HEENT:  NO PALLOR, NO  JVD,  NO   NODES, NECK SUPPLE  CVS: S1 +, S2 +,   RS: AEEB,  OCCASIONAL  RALES +,  RONCHI +  ABD: SOFT, NT, NO, BS +       EXT: NO PE  SKIN: WARM,   RIGHT FOOT WRAPPED  SKELETAL:  ROM ACCEPTABLE  CNS:  AAO X  2-3        RADIOLOGY :    < from: Transthoracic Echocardiogram (10.07.21 @ 15:26) >  CONCLUSIONS:  1. Normal mitral valve. Moderate mitral regurgitation.  2. Calcified aortic valve with decreased opening, cannot  exclude bicuspid. Peak transaortic valve gradient equals  32.4 mm Hg, mean transaortic valve gradient equals 19 mm  Hg, dimensionless index 0.22, estimated aortic valve area  equals 0.6sqcm (by continuity equation), consistent with  paradoxical low-flow low-gradient severe aortic stenosis.  Consider dobutamine stress echocardiogram and/or SABIHA for  further evaluation.  No aortic valve regurgitation seen.  3. Normal aortic root.  4. Normal left atrium.  5. Moderate concentric left ventricular hypertrophy.  6. Moderate global left ventricular systolic dysfunction  (EF 40-45% by visual estimation).  7. Grade II diastolic dysfunction (moderate).  8. Normal right atrium.  9. Normal right ventricular size with decreased RV systolic  function (TAPSE 1.2 cm).  10. RV systolic pressure is borderline normal at  34 mm Hg.  11. Tricuspid valve not well seen. Trace tricuspid  regurgitation.  12. Normal pulmonic valve. Trace pulmonic insufficiency is  noted.  13. No pericardial effusion.  14. Bilateral pleural effusions.    < end of copied text >      EXAM:  CT ABDOMEN AND PELVIS OC                            PROCEDURE DATE:  09/27/2021          INTERPRETATION:  CLINICAL INFORMATION: Small bowel obstruction.    COMPARISON: None.    CONTRAST/COMPLICATIONS:  IV Contrast: NONE  Oral Contrast: Omnipaque 300  Complications: None reported at time of study completion    PROCEDURE:  CT of the Abdomen and Pelvis was performed.  Sagittal and coronal reformats were performed.    FINDINGS:  LOWER CHEST: Small bilateral pleural effusions with compressiveatelectasis of both lower lobes.    LIVER: Within normal limits.  BILE DUCTS: Normal caliber.  GALLBLADDER: Distended. Small layering gallstones.  SPLEEN: Within normal limits.  PANCREAS: Within normal limits.  ADRENALS: Within normal limits.  KIDNEYS/URETERS: No hydronephrosis. Nonobstructing left upper pole calculus, measuring 0.6 cm.    BLADDER: Urinary bladder contains air and a Castañeda catheter balloon.  REPRODUCTIVE ORGANS: Prostate is not enlarged.    BOWEL: No bowel obstruction. Appendix isnormal. Colonic diverticulosis.  PERITONEUM: Mild presacral fluid.  VESSELS: Atherosclerotic changes.  RETROPERITONEUM/LYMPH NODES: No lymphadenopathy.  ABDOMINAL WALL: Within normal limits.  BONES: Degenerative changes. Sternotomy.    IMPRESSION:  No acute intra-abdominal pathology.    Small bilateral pleural effusions with compressive atelectasis of both lower lobes.    ====================================================    EXAM:  MR FOOT RT                            PROCEDURE DATE:  09/17/2021          INTERPRETATION:  Clinical Information: Recent fifth metatarsal head resection now with fifth digit pain, swelling and foul discharge.    Comparison: Radiographs of the right foot from 9/16/2021 and MRI the right foot from 8/16/2021.    Technique:  MRI of the right midfoot and forefoot.  Intravenous Contrast: None.    Findings:    There is a resection at the mid aspect of the fifth metatarsal shaft. There is a lateral soft tissue wound beginning at the level of the amputation which extends distally. There is susceptibility artifact in the region of the amputation consistent with postoperative change. There is hyperintense T2 marrow signal throughout the remaining fifth metatarsal and within the adjacent fourth metatarsal head which is nonspecific and could be related to recent postoperative changes although osteomyelitis is suspected.    There is edema and mild atrophy within the plantar muscles of the foot. There is minimal spurring at the first metatarsophalangeal and hallux sesamoid articulations.    Impression:  Resection at the mid aspect of the fifth metatarsal. Lateral soft tissue wound with marrow signal abnormality throughout the fifth metatarsal and within the adjacent fourth metatarsal head which is nonspecific in the setting of recent surgery although osteomyelitis is suspected.        ASSESSMENT :     Headache    HTN (hypertension)    HLD (hyperlipidemia)    DM (diabetes mellitus)    CAD (coronary artery disease)    Glaucoma, angle-closure    [place] (hard of hearing)      S/P CABG (coronary artery bypass graft)    History of thyroid surgery        PLAN:    HPI:  76M from home, lives with daughter w/ PMH DM on insulin, HTN, HLD, CAD, 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 Pt with sharp pain on the R lateral foot. As per daughter, pt was taking tylenol which did not help his pain prompting the patient to ask his daughter to take him to the hospital. Pt is a poor historian. Daughter states that the patient did not see his podiatrist after the surgery. No fever, chest, pain, palpitations, nausea, vomiting, diarrhea (17 Sep 2021 01:11)      # PATIENT IS S/P ICU MONITORING AND RIGHT CHEST TUBE REMOVAL ON 10/15/2021     # COVID EXPOSURE ON 10/13 AND ONCE MORE HAD RE-EXPOSURE ON 10/22 - INFECTION CONTROL IS AWARE AND ADDRESSING - ON CONTACT AND DROPLET ISOLATION PRECAUTION; F/U COVID PCR    # SUSPECT RIGHT FOOT OSTEOMYELITIS S/P RIGHT 5TH RAY RESECTION [8/19] AND S/P DEBRIDEMENT, GRAFT APPLICATION, BONE BX AND WOUND VAC APPLICATION 9/22 - BONE BX W/ ACUTE OSTEOMYELITIS AND NECROTIC PERIOSTEAL TISSUE  ** RECENT ADMISSION FOR RIGHT DIABETIC FOOT ULCER, CELLULITIS, OSTEOMYELITIS RIGHT 5th METATARSAL S/P RIGHT 5TH PARTIAL RAY AMPUTATION [8/20] - BONE PATHOLOGY W/ CLEAN MARGINS    - S/P VANCOMYCIN AND ZOSYN ; NOW ON UNASYN AND LEVAQUIN GIVEN OREN; PER ID SWITCH TO ZOSYN UNTIL 11/3   - RECENTLY HAD SIMON W/ MILD PAD  - REVIEWED WOUND CX [ ENTEROCOCCUS, AEROMONAS, KLEBSIELLA] AND BCX  - ID CONSULT, PODIATRY CONSULT    - PICC LINE PLACED TO COMPLETE 6 WEEKS OF ANTIBIOTICS - ON ZOSYN UNTIL 11/3 ON D/C    # ACUTE HYPOXIC RESPIRATORY FAILURE DUE TO ACUTE ON CHRONIC SYSTOLIC CHF  (  LV EF 40- 45 % ) , DIASTOLIC LV DYSFUNCTION , ,  SEVERE AORTIC STENOSIS & MODERATE MITRAL REGURGITATION  &  ASPIRATION PNA;  - S/P CHEST TUBE INSERTION AND REMOVAL  , S/P LASIX ,   CARDIOLOGY CONSULT IN PROGRESS      - CHEST TUBE PLACED [10/8] - FLUID CONSISTENT WITH TRANSUDATIVE PROCESS  - S/P EXTUBATION 10/13  - S/P CHEST TUBE REMOVAL 10/15      # SEPTIC SHOCK - ? S/T HYPOVOLEMIA VS. SEPSIS - S/P CVC [SWITCHED FROM FEMORAL TO LEFT IJ], S/P VASOPRESSORS - RESOLVING  - BCX - NGTD  - ON MEROPENEM      # TRANSIENT NEW ONSET A.FIB, PAROXYSMAL - ON ELIQUIS, CARDIOLOGY CONSULT IN PROGRESS    # FUNGURIA - D/C FLUCONAZOLE GIVEN TRANSAMINITIS    # TRANSAMINITIS - SUSPECT THIS IS ISCHEMIC HEPATITIS - IMPROVING - HEPATOLOGY CONSULT IN PROGRESS    # BOWEL DISTENTION - ? ILEUS - OPTIMIZING ELECTROLYTES - IMPROVED  - SURGERY CONSULT IN PROGRESS    # CHOLELITHIASIS - TRENDING LFTS, RUQ U/S - CHOLELITHIASIS, S/P SURGERY CONSULT   - S/P GI CONSULT - LESS SUSPICIOUS FOR CHOLECYSTITIS    # DYSPEPSIA - PLACED ON PPI BID WITH IMPROVEMENT, UNDERWENT ST EVAL     # ELEVATED TROPONINS - NSTEMI - ? DEMAND - WILL NEED ISCHEMIC EVAL ONCE ACUTE INFECTION RESOLVES PER CARDIOLOGY, CARDIOLOGY CONSULT IN PROGRESS  - ON ASA, STATIN, BB    # OREN ON CKD - S/T ATN AND URINARY RETENTION - S/P IVF, NOW W/ CASTAÑEDA, NEPHROLOGY CONUSLT IN PROGRESS  -  BPH ON FLOMAX, AND FINASTERIDE  - FAILED TOV WITH WORSENING RENAL FXN - NOTED URINARY RETENTION [10/4] - REPLACED CASTAÑEDA  - TOV 10/18 - BLADDER SCAN BID TO EVALUATE FOR URINARY RETENTION - FAILED TOV  - OVERNIGHT 10/18 - CASTAÑEDA REPLACED    # MEDICAL CLEARANCE FOR SURGERY - RCRI - 2 - 10.1 % 30 DAY RISK OF DEATH, MI OR CARDIAC ARREST  - CARDIOLOGY CONSULT     # DM -  HBA1C - 11  - LANTUS, SSI + FS    # CAD S/P CABG - PLACED ON ASA, STATIN, BB  - ECHO - SEVERE AORTIC STENOSIS, SEVERE CONCENTRIC LVH, G1DD, MILD MD  - CARDIOLOGY CONSULT - DR. BASSETT   - MAY NEED ISCHEMIC EVAL ONCE ACUTE ILLNESS RESOLVES INCLUDING CARDIAC CATHETERIZATION    # HTN, HLD  - ON METOPROLOL, NIFEDIPINE, STATIN       #  IMPAIRED GAIT DUE TO CERVICAL & LS SPONDYLOSIS, POLY ARTHRITIS AND DIABETIC PERIPHERAL NEUROPATHY, OP VITAMIN D DEFICIENCY - ON CHOLECALCIFEROL, OBTAIN PT & OT   #  FAILURE TO THRIVE , MODERATE PROTEIN CALORIE MALNUTRITION       # CASE DISCUSSED AT LENGTH WITH PATIENT AND DAUGHTER, BIRDIE ROBERT AT BEDSIDE AND VIA PHONE @ [phone] [10/5] - CASE DICUSSED AT LENGTH AND ALL QUESTIONS WERE ANSWERED. DAUGHTER UNDERSTOOD THAT PROGNOSIS IS GUARDED.  # PATIENT AND DAUGHTER REFUSED [facility] ON PREVIOUS ADMISSION, PREVIOUSLY WISHED FOR PATIENT RETURN HOME W/ HOME PT; HOWEVER NOW, DAUGHTER WISHES FOR PATIENT TO GO TO [facility] AS PATIENT'S WIFE IS CURRENTLY ADMITTED THERE    # GI AND DVT PPX

## 2021-10-22 NOTE — PROGRESS NOTE ADULT - PROBLEM SELECTOR PLAN 8
Pharmacy is requesting refills.      simvastatin 20 MG Oral Tab TAKE ONE TABLET BY MOUTH NIGHTLY Disp: 90 tablet Rfl: 0 OREN resolved  SCr 1.07  Failed TOV- will need to continue ibrahim and outpatient f/u with Urology  closely monitor SCr, as patient is restarted on Zosyn

## 2021-10-22 NOTE — PROGRESS NOTE ADULT - PROBLEM SELECTOR PROBLEM 2
Follow-up note  I spoke with Monique Keating's and her , Yao this morning, informing them that her divalproex blood level was adequate and no change in her oral dose was needed. Yao reported she was well as did the patient, and she had no medication side effects. I also authorized a refill on her Latuda prescription. She will follow-up with Dr. Bowie for continued pharmacotherapy needs.  Montana Chavez MD  8/11/2018 11:41 AM     Acute respiratory failure with hypoxia

## 2021-10-22 NOTE — PROGRESS NOTE ADULT - ASSESSMENT
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  As per daughter, patient was taking tylenol which did not help his pain prompting the patient to ask his daughter to take him to the hospital. ON admission, he has no fever or Leukocytosis. The Xray of Right foot shows no Osteomyelitis but MRI of Right foot shows Lateral soft tissue wound with marrow signal abnormality throughout the fifth metatarsal which is consistent of Osteomyelitis. He has seen by Podiatry and wound culture has sent, Zosyn and Vancomycin has started. The ID consult requested to assist with further evaluation and antibiotic management.     # Right Fifth metatarsal DFU with drainage and Osteomyelitis- wound cx grew Enterococcus, Aeromonas and Klebsiella - Zosyn is the ideal to cover All organisms but kidney function is worsening, hence change to Unasyn and Levaquin until kidney function is improved - The pathology shows Bone with acute osteomyelitis and necrotic periosteal tissue.   # OREN- s/p urinary retention -s/p ibrahim catheter - s/p discontinue ACEI  # RLL pneumonia- most likely Aspiration, S/p Vomiting - On Unasyn  # Large bowel distension  # Candiduria- 9/22/21  # Pulmonary edema with bilateral moderate to large pleural effusions- on CT chest, 10/5/21- s/p intubation 10/7- s/p Right sided chest tube placement by CT team, 10/8/21  # COVID Exposure - PCR negative as of  10/11/21    would recommend:    1. Aspiration precaution    2. Continue Zosyn until 11/3/21  3. Monitor  kidney function closely while on Zosyn   4. Wound care as per Podiatry  5. OOB to chair /PT    Attending Attestation:    Spent more than 35 minutes on total encounter, more than 50 % of the visit was spent counseling and/or coordinating care by the Attending physician.   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  As per daughter, patient was taking tylenol which did not help his pain prompting the patient to ask his daughter to take him to the hospital. ON admission, he has no fever or Leukocytosis. The Xray of Right foot shows no Osteomyelitis but MRI of Right foot shows Lateral soft tissue wound with marrow signal abnormality throughout the fifth metatarsal which is consistent of Osteomyelitis. He has seen by Podiatry and wound culture has sent, Zosyn and Vancomycin has started. The ID consult requested to assist with further evaluation and antibiotic management.     # Right Fifth metatarsal DFU with drainage and Osteomyelitis- wound cx grew Enterococcus, Aeromonas and Klebsiella - Zosyn is the ideal to cover All organisms but kidney function is worsening, hence change to Unasyn and Levaquin until kidney function is improved - The pathology shows Bone with acute osteomyelitis and necrotic periosteal tissue.   # OREN- s/p urinary retention -s/p ibrahim catheter - s/p discontinue ACEI  # RLL pneumonia- most likely Aspiration, S/p Vomiting - On Unasyn  # Large bowel distension  # Candiduria- 9/22/21  # Pulmonary edema with bilateral moderate to large pleural effusions- on CT chest, 10/5/21- s/p intubation 10/7- s/p Right sided chest tube placement by CT team, 10/8/21  # COVID Exposure - PCR negative as of  10/11/21, 10/21/21    would recommend:    1. OOB to chair /PT    2. Aspiration precaution    3. Monitor  kidney function closely while on Zosyn   4. Wound care as per Podiatry  5. Continue Zosyn until 11/3/21    Attending Attestation:    Spent more than 35 minutes on total encounter, more than 50 % of the visit was spent counseling and/or coordinating care by the Attending physician.

## 2021-10-22 NOTE — PROGRESS NOTE ADULT - SUBJECTIVE AND OBJECTIVE BOX
C A R D I O L O G Y  **********************************    DATE OF SERVICE: 10-22-21    Patient denies chest pain or shortness of breath.   Review of symptoms otherwise negative.    acetaminophen   Tablet .. 650 milliGRAM(s) Oral every 6 hours PRN  apixaban 5 milliGRAM(s) Oral two times a day  aspirin  chewable 81 milliGRAM(s) Oral daily  atorvastatin 80 milliGRAM(s) Oral at bedtime  chlorhexidine 2% Cloths 1 Application(s) Topical <User Schedule>  collagenase Ointment 1 Application(s) Topical daily  cyanocobalamin 1000 MICROGram(s) Oral daily  dextrose 5% + sodium chloride 0.45%. 1000 milliLiter(s) IV Continuous <Continuous>  dextrose 5%. 1000 milliLiter(s) IV Continuous <Continuous>  ergocalciferol 82905 Unit(s) Oral <User Schedule>  ferrous    sulfate Liquid 300 milliGRAM(s) Enteral Tube daily  finasteride 5 milliGRAM(s) Oral daily  gabapentin 100 milliGRAM(s) Oral three times a day  glucagon  Injectable 1 milliGRAM(s) IntraMuscular once  insulin lispro (ADMELOG) corrective regimen sliding scale   SubCutaneous Before meals and at bedtime  metoprolol tartrate 25 milliGRAM(s) Oral two times a day  NIFEdipine XL 60 milliGRAM(s) Oral daily  ondansetron Injectable 4 milliGRAM(s) IV Push three times a day PRN  pantoprazole  Injectable 40 milliGRAM(s) IV Push at bedtime  piperacillin/tazobactam IVPB.. 3.375 Gram(s) IV Intermittent every 8 hours  sodium chloride 0.9% lock flush 10 milliLiter(s) IV Push every 1 hour PRN                            11.7   4.43  )-----------( 330      ( 22 Oct 2021 07:07 )             34.0       Hemoglobin: 11.7 g/dL (10-22 @ 07:07)  Hemoglobin: 9.6 g/dL (10-21 @ 04:56)  Hemoglobin: 9.7 g/dL (10-20 @ 07:22)  Hemoglobin: 9.2 g/dL (10-19 @ 06:34)  Hemoglobin: 9.1 g/dL (10-18 @ 07:50)      10-22    140  |  105  |  12  ----------------------------<  101<H>  4.3   |  27  |  1.07    Ca    9.7      22 Oct 2021 07:07  Phos  3.5     10-22  Mg     2.3     10-22    TPro  6.8  /  Alb  2.4<L>  /  TBili  0.7  /  DBili  x   /  AST  31  /  ALT  50  /  AlkPhos  130<H>  10-21    Creatinine Trend: 1.07<--, 0.84<--, 1.00<--, 0.97<--, 0.97<--, 1.05<--    COAGS:           T(C): 36.9 (10-22-21 @ 13:41), Max: 36.9 (10-22-21 @ 13:41)  HR: 65 (10-22-21 @ 17:21) (65 - 84)  BP: 95/62 (10-22-21 @ 17:21) (95/62 - 155/70)  RR: 17 (10-22-21 @ 13:41) (17 - 17)  SpO2: 100% (10-22-21 @ 13:41) (100% - 100%)  Wt(kg): --    I&O's Summary    21 Oct 2021 07:01  -  22 Oct 2021 07:00  --------------------------------------------------------  IN: 0 mL / OUT: 1450 mL / NET: -1450 mL        HEENT:  (-)icterus (-)pallor  CV: N S1 S2 1/6 TRINIDAD (+)2 Pulses B/l  Resp:  Coarse sounds B/L, normal effort  GI: (+) BS Soft, NT, ND  Lymph:  (-)Edema, (-)obvious lymphadenopathy  Skin: Warm to touch, Normal turgor  Psych: Unable to adequately  mood and affect        ASSESSMENT/PLAN: 	76y  Male PMH DM on insulin, HTN, HLD, normal lV fx moderate to severe AS 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    - crt improved  -  complete course of Abx per ID  - Echo noted, Severe AS, EF 40-45%   - He will need a SABIHA and R+L heart cath when he recovers and has no active infection  - Cont medical management of CAD  - Pt. with new onset PAF now on Eliquis  - Pig tail removed on 10/16  - D/C planning per medical team    Zak Wilkins MD, [facility]  BEEPER [phone]

## 2021-10-22 NOTE — PROGRESS NOTE ADULT - SUBJECTIVE AND OBJECTIVE BOX
HPI:  76 YOM admitted with OM.  On ABX until 11/3, PICC placed.  [facility]    OVERNIGHT EVENTS:  No new overnight events.  Seen and examined at bedside.     REVIEW OF SYSTEMS:      CONSTITUTIONAL: No fever  EYES: no acute visual disturbances  NECK: No pain or stiffness  RESPIRATORY: No cough; No shortness of breath  CARDIOVASCULAR: No chest pain, no palpitations  GASTROINTESTINAL: No pain. No nausea, vomiting or diarrhea   NEUROLOGICAL: No headache or numbness, no tremors  MUSCULOSKELETAL: No joint pain, no muscle pain  GENITOURINARY: no dysuria, no frequency, no hesitancy  PSYCHIATRY: no depression, no anxiety  ALL OTHER  ROS negative        Vital Signs Last 24 Hrs  T(C): 36.9 (22 Oct 2021 13:41), Max: 36.9 (22 Oct 2021 13:41)  T(F): 98.5 (22 Oct 2021 13:41), Max: 98.5 (22 Oct 2021 13:41)  HR: 84 (22 Oct 2021 13:41) (77 - 84)  BP: 146/66 (22 Oct 2021 13:41) (125/73 - 155/70)  BP(mean): --  RR: 17 (22 Oct 2021 13:41) (17 - 17)  SpO2: 100% (22 Oct 2021 13:41) (100% - 100%)    ________________________________________________  PHYSICAL EXAM:    GENERAL: NAD  HEENT: Normocephalic; conjunctivae and sclerae clear;  NECK : supple, no JVD  CHEST/LUNG: Clear to auscultation; Nonlabored  HEART: S1 S2  regular  ABDOMEN: Soft, Nontender, Nondistended; Bowel sounds present  EXTREMITIES: no cyanosis; no LE edema; no calf tenderness  SKIN: warm and dry; No rashes or lesions  NERVOUS SYSTEM:  Alert; no new deficits    _________________________________________________  CURRENT MEDICATIONS:    MEDICATIONS  (STANDING):  apixaban 5 milliGRAM(s) Oral two times a day  aspirin  chewable 81 milliGRAM(s) Oral daily  atorvastatin 80 milliGRAM(s) Oral at bedtime  chlorhexidine 2% Cloths 1 Application(s) Topical <User Schedule>  collagenase Ointment 1 Application(s) Topical daily  cyanocobalamin 1000 MICROGram(s) Oral daily  dextrose 5% + sodium chloride 0.45%. 1000 milliLiter(s) (75 mL/Hr) IV Continuous <Continuous>  dextrose 5%. 1000 milliLiter(s) (100 mL/Hr) IV Continuous <Continuous>  ergocalciferol 45304 Unit(s) Oral <User Schedule>  ferrous    sulfate Liquid 300 milliGRAM(s) Enteral Tube daily  finasteride 5 milliGRAM(s) Oral daily  gabapentin 100 milliGRAM(s) Oral three times a day  glucagon  Injectable 1 milliGRAM(s) IntraMuscular once  insulin lispro (ADMELOG) corrective regimen sliding scale   SubCutaneous Before meals and at bedtime  metoprolol tartrate 25 milliGRAM(s) Oral two times a day  NIFEdipine XL 60 milliGRAM(s) Oral daily  pantoprazole  Injectable 40 milliGRAM(s) IV Push at bedtime  piperacillin/tazobactam IVPB.. 3.375 Gram(s) IV Intermittent every 8 hours    MEDICATIONS  (PRN):  acetaminophen   Tablet .. 650 milliGRAM(s) Oral every 6 hours PRN Temp greater or equal to 38C (100.4F), Moderate Pain (4 - 6)  ondansetron Injectable 4 milliGRAM(s) IV Push three times a day PRN Nausea and/or Vomiting  sodium chloride 0.9% lock flush 10 milliLiter(s) IV Push every 1 hour PRN Pre/post blood products, medications, blood draw, and to maintain line patency      __________________________________________________  LABS:                          11.7   4.43  )-----------( 330      ( 22 Oct 2021 07:07 )             34.0     10-22    140  |  105  |  12  ----------------------------<  101<H>  4.3   |  27  |  1.07    Ca    9.7      22 Oct 2021 07:07  Phos  3.5     10-22  Mg     2.3     10-22    TPro  6.8  /  Alb  2.4<L>  /  TBili  0.7  /  DBili  x   /  AST  31  /  ALT  50  /  AlkPhos  130<H>  10-21        CAPILLARY BLOOD GLUCOSE      POCT Blood Glucose.: 104 mg/dL (22 Oct 2021 11:21)  POCT Blood Glucose.: 98 mg/dL (22 Oct 2021 08:12)  POCT Blood Glucose.: 96 mg/dL (21 Oct 2021 21:17)  POCT Blood Glucose.: 109 mg/dL (21 Oct 2021 16:40)      __________________________________________________  RADIOLOGY & ADDITIONAL TESTS:    Imaging Personally Reviewed:  YES    < from: MR Foot No Cont, Right (09.17.21 @ 13:04) >  Impression:  Resection at the mid aspect of the fifth metatarsal. Lateral soft tissue wound with marrow signal abnormality throughout the fifth metatarsal and within the adjacent fourth metatarsal head which is nonspecific in the setting of recent surgery although osteomyelitis is suspected.    < end of copied text >    < from: Xray Abdomen 1 View PORTABLE -Urgent (Xray Abdomen 1 View PORTABLE -Urgent .) (10.16.21 @ 20:25) >  IMPRESSION:  Nonspecific bowel gas pattern. No definite obstruction. No opaque urinary biliary calculi. Vascular calcification. Advanced degenerative change thoracolumbar spine.    < end of copied text >    Consultant(s) Notes Reviewed:   YES     Plan of care was discussed with patient and /or primary care giver; all questions and concerns were addressed and care was aligned with patient's wishes.    Plan discussed with attending and consulting physicians.

## 2021-10-23 LAB
GLUCOSE BLDC GLUCOMTR-MCNC: 150 MG/DL — HIGH (ref 70–99)
GLUCOSE BLDC GLUCOMTR-MCNC: 90 MG/DL — SIGNIFICANT CHANGE UP (ref 70–99)
GLUCOSE BLDC GLUCOMTR-MCNC: 91 MG/DL — SIGNIFICANT CHANGE UP (ref 70–99)
GLUCOSE BLDC GLUCOMTR-MCNC: 99 MG/DL — SIGNIFICANT CHANGE UP (ref 70–99)
HCT VFR BLD CALC: 30.8 % — LOW (ref 39–50)
HGB BLD-MCNC: 10.4 G/DL — LOW (ref 13–17)
MCHC RBC-ENTMCNC: 30.7 PG — SIGNIFICANT CHANGE UP (ref 27–34)
MCHC RBC-ENTMCNC: 33.8 GM/DL — SIGNIFICANT CHANGE UP (ref 32–36)
MCV RBC AUTO: 90.9 FL — SIGNIFICANT CHANGE UP (ref 80–100)
NRBC # BLD: 0 /100 WBCS — SIGNIFICANT CHANGE UP (ref 0–0)
PLATELET # BLD AUTO: 310 K/UL — SIGNIFICANT CHANGE UP (ref 150–400)
RBC # BLD: 3.39 M/UL — LOW (ref 4.2–5.8)
RBC # FLD: 14.8 % — HIGH (ref 10.3–14.5)
WBC # BLD: 4.26 K/UL — SIGNIFICANT CHANGE UP (ref 3.8–10.5)
WBC # FLD AUTO: 4.26 K/UL — SIGNIFICANT CHANGE UP (ref 3.8–10.5)

## 2021-10-23 RX ADMIN — Medication 300 MILLIGRAM(S): at 13:03

## 2021-10-23 RX ADMIN — APIXABAN 5 MILLIGRAM(S): 2.5 TABLET, FILM COATED ORAL at 17:08

## 2021-10-23 RX ADMIN — Medication 81 MILLIGRAM(S): at 13:03

## 2021-10-23 RX ADMIN — Medication 25 MILLIGRAM(S): at 06:38

## 2021-10-23 RX ADMIN — Medication 1 APPLICATION(S): at 13:03

## 2021-10-23 RX ADMIN — FINASTERIDE 5 MILLIGRAM(S): 5 TABLET, FILM COATED ORAL at 13:03

## 2021-10-23 RX ADMIN — PIPERACILLIN AND TAZOBACTAM 25 GRAM(S): 4; .5 INJECTION, POWDER, LYOPHILIZED, FOR SOLUTION INTRAVENOUS at 23:36

## 2021-10-23 RX ADMIN — ATORVASTATIN CALCIUM 80 MILLIGRAM(S): 80 TABLET, FILM COATED ORAL at 23:37

## 2021-10-23 RX ADMIN — GABAPENTIN 100 MILLIGRAM(S): 400 CAPSULE ORAL at 23:41

## 2021-10-23 RX ADMIN — PREGABALIN 1000 MICROGRAM(S): 225 CAPSULE ORAL at 13:03

## 2021-10-23 RX ADMIN — Medication 25 MILLIGRAM(S): at 17:08

## 2021-10-23 RX ADMIN — APIXABAN 5 MILLIGRAM(S): 2.5 TABLET, FILM COATED ORAL at 06:38

## 2021-10-23 RX ADMIN — GABAPENTIN 100 MILLIGRAM(S): 400 CAPSULE ORAL at 13:03

## 2021-10-23 RX ADMIN — GABAPENTIN 100 MILLIGRAM(S): 400 CAPSULE ORAL at 06:38

## 2021-10-23 RX ADMIN — PIPERACILLIN AND TAZOBACTAM 25 GRAM(S): 4; .5 INJECTION, POWDER, LYOPHILIZED, FOR SOLUTION INTRAVENOUS at 06:39

## 2021-10-23 RX ADMIN — CHLORHEXIDINE GLUCONATE 1 APPLICATION(S): 213 SOLUTION TOPICAL at 06:38

## 2021-10-23 RX ADMIN — Medication 60 MILLIGRAM(S): at 06:38

## 2021-10-23 RX ADMIN — PIPERACILLIN AND TAZOBACTAM 25 GRAM(S): 4; .5 INJECTION, POWDER, LYOPHILIZED, FOR SOLUTION INTRAVENOUS at 13:03

## 2021-10-23 NOTE — PROGRESS NOTE ADULT - ASSESSMENT
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  As per daughter, patient was taking tylenol which did not help his pain prompting the patient to ask his daughter to take him to the hospital. ON admission, he has no fever or Leukocytosis. The Xray of Right foot shows no Osteomyelitis but MRI of Right foot shows Lateral soft tissue wound with marrow signal abnormality throughout the fifth metatarsal which is consistent of Osteomyelitis. He has seen by Podiatry and wound culture has sent, Zosyn and Vancomycin has started. The ID consult requested to assist with further evaluation and antibiotic management.     # Right Fifth metatarsal DFU with drainage and Osteomyelitis- wound cx grew Enterococcus, Aeromonas and Klebsiella - Zosyn is the ideal to cover All organisms but kidney function is worsening, hence change to Unasyn and Levaquin until kidney function is improved - The pathology shows Bone with acute osteomyelitis and necrotic periosteal tissue.   # OREN- s/p urinary retention -s/p ibrahim catheter - s/p discontinue ACEI  # RLL pneumonia- most likely Aspiration, S/p Vomiting - On Unasyn  # Large bowel distension  # Candiduria- 9/22/21  # Pulmonary edema with bilateral moderate to large pleural effusions- on CT chest, 10/5/21- s/p intubation 10/7- s/p Right sided chest tube placement by CT team, 10/8/21  # COVID Exposure - PCR negative as of  10/11/21, 10/21/21    would recommend:    1. OOB to chair /PT    2. Aspiration precaution    3. Monitor  kidney function closely while on Zosyn   4. Wound care as per Podiatry  5. Continue Zosyn until 11/3/21    Attending Attestation:    Spent more than 35 minutes on total encounter, more than 50 % of the visit was spent counseling and/or coordinating care by the Attending physician.

## 2021-10-23 NOTE — PROGRESS NOTE ADULT - SUBJECTIVE AND OBJECTIVE BOX
C A R D I O L O G Y  **********************************    DATE OF SERVICE: 10-23-21    Patient denies chest pain or shortness of breath.   Review of symptoms otherwise negative.    acetaminophen   Tablet .. 650 milliGRAM(s) Oral every 6 hours PRN  apixaban 5 milliGRAM(s) Oral two times a day  aspirin  chewable 81 milliGRAM(s) Oral daily  atorvastatin 80 milliGRAM(s) Oral at bedtime  chlorhexidine 2% Cloths 1 Application(s) Topical <User Schedule>  collagenase Ointment 1 Application(s) Topical daily  cyanocobalamin 1000 MICROGram(s) Oral daily  dextrose 5% + sodium chloride 0.45%. 1000 milliLiter(s) IV Continuous <Continuous>  dextrose 5%. 1000 milliLiter(s) IV Continuous <Continuous>  ergocalciferol 20359 Unit(s) Oral <User Schedule>  ferrous    sulfate Liquid 300 milliGRAM(s) Enteral Tube daily  finasteride 5 milliGRAM(s) Oral daily  gabapentin 100 milliGRAM(s) Oral three times a day  glucagon  Injectable 1 milliGRAM(s) IntraMuscular once  insulin lispro (ADMELOG) corrective regimen sliding scale   SubCutaneous Before meals and at bedtime  metoprolol tartrate 25 milliGRAM(s) Oral two times a day  NIFEdipine XL 60 milliGRAM(s) Oral daily  ondansetron Injectable 4 milliGRAM(s) IV Push three times a day PRN  pantoprazole  Injectable 40 milliGRAM(s) IV Push at bedtime  piperacillin/tazobactam IVPB.. 3.375 Gram(s) IV Intermittent every 8 hours  sodium chloride 0.9% lock flush 10 milliLiter(s) IV Push every 1 hour PRN                            10.4   4.26  )-----------( 310      ( 23 Oct 2021 06:22 )             30.8       Hemoglobin: 10.4 g/dL (10-23 @ 06:22)  Hemoglobin: 11.7 g/dL (10-22 @ 07:07)  Hemoglobin: 9.6 g/dL (10-21 @ 04:56)  Hemoglobin: 9.7 g/dL (10-20 @ 07:22)  Hemoglobin: 9.2 g/dL (10-19 @ 06:34)      10-22    140  |  105  |  12  ----------------------------<  101<H>  4.3   |  27  |  1.07    Ca    9.7      22 Oct 2021 07:07  Phos  3.5     10-22  Mg     2.3     10-22      Creatinine Trend: 1.07<--, 0.84<--, 1.00<--, 0.97<--, 0.97<--, 1.05<--    COAGS:           T(C): 36.9 (10-23-21 @ 13:21), Max: 36.9 (10-23-21 @ 13:21)  HR: 86 (10-23-21 @ 13:21) (65 - 92)  BP: 138/79 (10-23-21 @ 17:08) (95/62 - 147/76)  RR: 17 (10-23-21 @ 13:21) (16 - 17)  SpO2: 99% (10-23-21 @ 13:21) (99% - 100%)  Wt(kg): --    I&O's Summary    22 Oct 2021 07:01  -  23 Oct 2021 07:00  --------------------------------------------------------  IN: 0 mL / OUT: 175 mL / NET: -175 mL    23 Oct 2021 07:01  -  23 Oct 2021 17:10  --------------------------------------------------------  IN: 0 mL / OUT: 400 mL / NET: -400 mL        HEENT:  (-)icterus (-)pallor  CV: N S1 S2 1/6 [place] (+)2 Pulses B/l  Resp:  Coarse sounds B/L, normal effort  GI: (+) BS Soft, NT, ND  Lymph:  (-)Edema, (-)obvious lymphadenopathy  Skin: Warm to touch, Normal turgor  Psych: Unable to adequately  mood and affect        ASSESSMENT/PLAN: 	76y  Male PMH DM on insulin, HTN, HLD, normal lV fx moderate to severe AS 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    - crt improved  -  complete course of Abx per ID  - Echo noted, Severe AS, EF 40-45%   - He will need a SABIHA and R+L heart cath when he recovers and has no active infection  - Cont medical management of CAD  - Pt. with new onset PAF now on Eliquis  - Pig tail removed on 10/16  - D/C planning per medical team    Zak Wilkins MD, [facility]  BEEPER [phone]

## 2021-10-23 NOTE — PROGRESS NOTE ADULT - SUBJECTIVE AND OBJECTIVE BOX
Patient is seen and examined at the bed side, is afebrile. No new events.       REVIEW OF SYSTEMS: All other review systems are negative      ALLERGIES: No Known Allergies      Vital Signs Last 24 Hrs  T(C): 36.9 (23 Oct 2021 13:21), Max: 36.9 (23 Oct 2021 13:21)  T(F): 98.4 (23 Oct 2021 13:21), Max: 98.4 (23 Oct 2021 13:21)  HR: 86 (23 Oct 2021 13:21) (86 - 92)  BP: 138/79 (23 Oct 2021 17:08) (135/67 - 147/76)  BP(mean): --  RR: 17 (23 Oct 2021 13:21) (16 - 17)  SpO2: 99% (23 Oct 2021 13:21) (99% - 100%)        PHYSICAL EXAM:  GENERAL: Not in distress  CHEST/LUNG:  Not using accessory muscles, s/p removal of Right CT   HEART: s1 and s2 present  ABDOMEN:  Mild distended   EXTREMITIES: Right foot bandage in placed   CNS: Awake and alert       LABS:                        10.4   4.26  )-----------( 310      ( 23 Oct 2021 06:22 )             30.8                           11.7   4.43  )-----------( 330      ( 22 Oct 2021 07:07 )             34.0       10-22    140  |  105  |  12  ----------------------------<  101<H>  4.3   |  27  |  1.07    Ca    9.7      22 Oct 2021 07:07  Phos  3.5     10-22  Mg     2.3     10-22      10-22    140  |  105  |  12  ----------------------------<  101<H>  4.3   |  27  |  1.07    Ca    9.7      22 Oct 2021 07:07  Phos  3.5     10-22  Mg     2.3     10-22    TPro  6.8  /  Alb  2.4<L>  /  TBili  0.7  /  DBili  x   /  AST  31  /  ALT  50  /  AlkPhos  130<H>  10-21      09-25    139  |  107  |  41<H>  ----------------------------<  134<H>  4.1   |  21<L>  |  4.05<H>    Ca    8.6      25 Sep 2021 06:40    TPro  6.6  /  Alb  2.3<L>  /  TBili  0.5  /  DBili  x   /  AST  25  /  ALT  15  /  AlkPhos  102  09-25        CAPILLARY BLOOD GLUCOSE  POCT Blood Glucose.: 134 mg/dL (17 Sep 2021 17:11)  POCT Blood Glucose.: 128 mg/dL (17 Sep 2021 11:06)  POCT Blood Glucose.: 157 mg/dL (17 Sep 2021 04:10)      Vancomycin Level, Trough (10.14.21 @ 11:32) : 21.8  Vancomycin Level, Trough (10.12.21 @ 12:13)   Vancomycin Level, Trough: 19.5:      MEDICATIONS  (STANDING):    apixaban 5 milliGRAM(s) Oral two times a day  aspirin  chewable 81 milliGRAM(s) Oral daily  atorvastatin 80 milliGRAM(s) Oral at bedtime  chlorhexidine 2% Cloths 1 Application(s) Topical <User Schedule>  collagenase Ointment 1 Application(s) Topical daily  cyanocobalamin 1000 MICROGram(s) Oral daily  dextrose 5% + sodium chloride 0.45%. 1000 milliLiter(s) (75 mL/Hr) IV Continuous <Continuous>  dextrose 5%. 1000 milliLiter(s) (100 mL/Hr) IV Continuous <Continuous>  ergocalciferol 80031 Unit(s) Oral <User Schedule>  ferrous    sulfate Liquid 300 milliGRAM(s) Enteral Tube daily  finasteride 5 milliGRAM(s) Oral daily  gabapentin 100 milliGRAM(s) Oral three times a day  glucagon  Injectable 1 milliGRAM(s) IntraMuscular once  insulin lispro (ADMELOG) corrective regimen sliding scale   SubCutaneous Before meals and at bedtime  metoprolol tartrate 25 milliGRAM(s) Oral two times a day  NIFEdipine XL 60 milliGRAM(s) Oral daily  pantoprazole  Injectable 40 milliGRAM(s) IV Push at bedtime  piperacillin/tazobactam IVPB.. 3.375 Gram(s) IV Intermittent every 8 hours        RADIOLOGY & ADDITIONAL TESTS:    10/5/21 CT Chest No Cont (10.05.21 @ 14:07) Pulmonary edema with bilateral moderate to large pleural effusions. Underlying bilateral lower lobe compressive atelectasis versus pneumonia.  Mildly enlarged mediastinal lymph nodes, possibly reactive.      10/2/21: Xray Chest 1 View- PORTABLE-Routine (Xray Chest 1 View- PORTABLE-Routine in AM.) (10.02.21 @ 09:46) There is persistent bilateral perihilar/basilar diffuse airspace disease and/or RIGHT effusion.  Cardiomegaly.  No interval change.    Collected Date/Time: 9/22/2021 08:42 EDT   Received Date/Time: 9/23/2021 09:29 EDT   Surgical Pathology Report - Auth (Verified)   Specimen(s) Submitted   1 Right 5th Toe Wound Debridement r/o Osteomyelitis   Final Diagnosis   Right fifth toe; wound debridement:   - Bone with acute osteomyelitis and necrotic periosteal tissue.     9/28/21: US Abdomen Upper Quadrant Right (09.28.21 @ 12:13) Cholelithiasis without evidence of acute cholecystitis. Note is made of right pleural effusion    9/27/21: CT Abdomen and Pelvis w/ Oral Cont (09.27.21 @ 15:53) >No acute intra-abdominal pathology.    Small bilateral pleural effusions with compressive atelectasis of both lower lobes.    9/26/21: Xray Chest 1 View-PORTABLE IMMEDIATE (Xray Chest 1 View-PORTABLE IMMEDIATE .) (09.26.21 @ 15:28) : Small bilateral pleural effusions and mild pulmonary edema. A right lower lobe pneumonia is not excluded.      9/26/21: Xray Abdomen 1 View Portable, IMMEDIATE (Xray Abdomen 1 View Portable, IMMEDIATE .) (09.26.21 @ 15:28) : There is a nasogastric tube with its tip in the stomach. There is gaseous distention of the colon. No pathologic calcifications are seen. The osseous structures are intact with degenerative change is present in the spine.      9/17/21 : MR Foot No Cont, Right (09.17.21 @ 13:04) : Resection at the mid aspect of the fifth metatarsal. Lateral soft tissue wound with marrow signal abnormality throughout the fifth metatarsal and within the adjacent fourth metatarsal head which is nonspecific in the setting of recent surgery although osteomyelitis is suspected.    9/16/21 : Xray Foot AP + Lateral + Oblique, Right (09.16.21 @ 15:03) : No acute finding. No plain film evidence of osteomyelitis.        MICROBIOLOGY DATA:    COVID-19 PCR (10.11.21 @ 05:44)   COVID-19 PCR: NotDetec:   Urine Microscopic-Add On (NC) (09.22.21 @ 16:14)   Red Blood Cell - Urine: 0-2 /HPF   White Blood Cell - Urine: 3-5 /HPF   Bacteria: Moderate /HPF   Comment - Urine: yeast cells present   Epithelial Cells: Moderate /HPF     Culture - Tissue with Gram Stain (09.22.21 @ 13:54)   Gram Stain: No polymorphonuclear cells seen per low power field   No organisms seen per oil power field   Specimen Source: .Tissue Right 5th toe r/o osteomyeltis   Culture Results: No growth     COVID-19 David Domain Antibody (09.18.21 @ 12:55)   COVID-19 David Domain Antibody Result: >250.00:    Culture - Blood (09.17.21 @ 10:28)   Specimen Source: .Blood Blood-Peripheral   Culture Results: No growth to date.     Culture - Blood (09.17.21 @ 10:28)   Specimen Source: .Blood Blood-Venous   Culture Results: No growth to date.     Culture - Surgical Swab (09.16.21 @ 21:42)   - Amikacin: S <=16   - Amikacin: S <=16   - Amoxicillin/Clavulanic Acid: S <=8/4   - Ampicillin: R >16 These ampicillin results predict results for amoxicillin   - Ampicillin: S <=2 Predicts results to ampicillin/sulbactam, amoxacillin-clavulanate and piperacillin-tazobactam.   - Ampicillin/Sulbactam: S 8/4 Enterobacter, Citrobacter, and Serratia may develop resistance during prolonged therapy (3-4 days)   - Ampicillin/Sulbactam: R >16/8   - Aztreonam: S <=4   - Aztreonam: S <=4   - Cefazolin: R 16 Enterobacter, Citrobacter, and Serratia may develop resistance during prolonged therapy (3-4 days)   - Cefazolin: R >16   - Cefepime: S <=2   - Cefepime: S <=2   - Cefoxitin: S <=8   - Cefoxitin: S <=8   - Ceftazidime: S <=1   - Ceftriaxone: S <=1   - Ceftriaxone: S <=1 Enterobacter, Citrobacter, and Serratia may develop resistance during prolonged therapy   - Ciprofloxacin: S <=0.25   - Ciprofloxacin: S <=0.25   - Ertapenem: S <=0.5   - Gentamicin: S <=2   - Gentamicin: S <=2   - Imipenem: S <=1   - Levofloxacin: S <=0.5   - Levofloxacin: S <=0.5   - Meropenem: S <=1   - Meropenem: S <=1   - Piperacillin/Tazobactam: S <=8   - Piperacillin/Tazobactam: S <=8   - Tetra/Doxy: S 4   - Tobramycin: S <=2   - Trimethoprim/Sulfamethoxazole: S <=0.5/9.5   - Trimethoprim/Sulfamethoxazole: S <=0.5/9.5   - Vancomycin: S 2   Specimen Source: .Surgical Swab right foot wound   Culture Results:   Culture yields >4 types of aerobic and/or anaerobic bacteria   Call client services within 7 days if further workup is clinically   indicated. Culture includes   Rare Aeromonas hydrophila/caviae   Few Klebsiella oxytoca/Raoutella ornithinolytica   Few Enterococcus faecalis   Few Streptococcus agalactiae (Group B) isolated   Group B streptococci are susceptible to ampicillin,   penicillin and cefazolin, but may be resistant to   erythromycin and clindamycin.   Recommendations for intrapartum prophylaxis for Group B   streptococci are penicillin or ampicillin.   Organism Identification: Aeromonas hydrophila/caviae   Klebsiella oxytoca /Raoutella ornithinolytica   Enterococcus faecalis   Organism: Aeromonas hydrophila/caviae   Organism: Klebsiella oxytoca /Raoutella ornithinolytica   Organism: Enterococcus faecalis   COVID-19 PCR . (09.16.21 @ 22:24)   COVID-19 PCR: NotDetec:           Patient is seen and examined at the bed side, is afebrile. He is doing fine, off oxygen.      REVIEW OF SYSTEMS: All other review systems are negative      ALLERGIES: No Known Allergies      Vital Signs Last 24 Hrs  T(C): 36.9 (23 Oct 2021 13:21), Max: 36.9 (23 Oct 2021 13:21)  T(F): 98.4 (23 Oct 2021 13:21), Max: 98.4 (23 Oct 2021 13:21)  HR: 86 (23 Oct 2021 13:21) (86 - 92)  BP: 138/79 (23 Oct 2021 17:08) (135/67 - 147/76)  BP(mean): --  RR: 17 (23 Oct 2021 13:21) (16 - 17)  SpO2: 99% (23 Oct 2021 13:21) (99% - 100%)        PHYSICAL EXAM:  GENERAL: Not in distress  CHEST/LUNG:  Not using accessory muscles, s/p removal of Right CT   HEART: s1 and s2 present  ABDOMEN:  Mild distended   EXTREMITIES: Right foot bandage in placed   CNS: Awake and alert       LABS:                        10.4   4.26  )-----------( 310      ( 23 Oct 2021 06:22 )             30.8                           11.7   4.43  )-----------( 330      ( 22 Oct 2021 07:07 )             34.0       10-22    140  |  105  |  12  ----------------------------<  101<H>  4.3   |  27  |  1.07    Ca    9.7      22 Oct 2021 07:07  Phos  3.5     10-22  Mg     2.3     10-22      10-22    140  |  105  |  12  ----------------------------<  101<H>  4.3   |  27  |  1.07    Ca    9.7      22 Oct 2021 07:07  Phos  3.5     10-22  Mg     2.3     10-22    TPro  6.8  /  Alb  2.4<L>  /  TBili  0.7  /  DBili  x   /  AST  31  /  ALT  50  /  AlkPhos  130<H>  10-21      09-25    139  |  107  |  41<H>  ----------------------------<  134<H>  4.1   |  21<L>  |  4.05<H>    Ca    8.6      25 Sep 2021 06:40    TPro  6.6  /  Alb  2.3<L>  /  TBili  0.5  /  DBili  x   /  AST  25  /  ALT  15  /  AlkPhos  102  09-25        CAPILLARY BLOOD GLUCOSE  POCT Blood Glucose.: 134 mg/dL (17 Sep 2021 17:11)  POCT Blood Glucose.: 128 mg/dL (17 Sep 2021 11:06)  POCT Blood Glucose.: 157 mg/dL (17 Sep 2021 04:10)      Vancomycin Level, Trough (10.14.21 @ 11:32) : 21.8  Vancomycin Level, Trough (10.12.21 @ 12:13)   Vancomycin Level, Trough: 19.5:      MEDICATIONS  (STANDING):    apixaban 5 milliGRAM(s) Oral two times a day  aspirin  chewable 81 milliGRAM(s) Oral daily  atorvastatin 80 milliGRAM(s) Oral at bedtime  chlorhexidine 2% Cloths 1 Application(s) Topical <User Schedule>  collagenase Ointment 1 Application(s) Topical daily  cyanocobalamin 1000 MICROGram(s) Oral daily  dextrose 5% + sodium chloride 0.45%. 1000 milliLiter(s) (75 mL/Hr) IV Continuous <Continuous>  dextrose 5%. 1000 milliLiter(s) (100 mL/Hr) IV Continuous <Continuous>  ergocalciferol 06067 Unit(s) Oral <User Schedule>  ferrous    sulfate Liquid 300 milliGRAM(s) Enteral Tube daily  finasteride 5 milliGRAM(s) Oral daily  gabapentin 100 milliGRAM(s) Oral three times a day  glucagon  Injectable 1 milliGRAM(s) IntraMuscular once  insulin lispro (ADMELOG) corrective regimen sliding scale   SubCutaneous Before meals and at bedtime  metoprolol tartrate 25 milliGRAM(s) Oral two times a day  NIFEdipine XL 60 milliGRAM(s) Oral daily  pantoprazole  Injectable 40 milliGRAM(s) IV Push at bedtime  piperacillin/tazobactam IVPB.. 3.375 Gram(s) IV Intermittent every 8 hours        RADIOLOGY & ADDITIONAL TESTS:    10/5/21 CT Chest No Cont (10.05.21 @ 14:07) Pulmonary edema with bilateral moderate to large pleural effusions. Underlying bilateral lower lobe compressive atelectasis versus pneumonia.  Mildly enlarged mediastinal lymph nodes, possibly reactive.      10/2/21: Xray Chest 1 View- PORTABLE-Routine (Xray Chest 1 View- PORTABLE-Routine in AM.) (10.02.21 @ 09:46) There is persistent bilateral perihilar/basilar diffuse airspace disease and/or RIGHT effusion.  Cardiomegaly.  No interval change.    Collected Date/Time: 9/22/2021 08:42 EDT   Received Date/Time: 9/23/2021 09:29 EDT   Surgical Pathology Report - Auth (Verified)   Specimen(s) Submitted   1 Right 5th Toe Wound Debridement r/o Osteomyelitis   Final Diagnosis   Right fifth toe; wound debridement:   - Bone with acute osteomyelitis and necrotic periosteal tissue.     9/28/21: US Abdomen Upper Quadrant Right (09.28.21 @ 12:13) Cholelithiasis without evidence of acute cholecystitis. Note is made of right pleural effusion    9/27/21: CT Abdomen and Pelvis w/ Oral Cont (09.27.21 @ 15:53) >No acute intra-abdominal pathology.    Small bilateral pleural effusions with compressive atelectasis of both lower lobes.    9/26/21: Xray Chest 1 View-PORTABLE IMMEDIATE (Xray Chest 1 View-PORTABLE IMMEDIATE .) (09.26.21 @ 15:28) : Small bilateral pleural effusions and mild pulmonary edema. A right lower lobe pneumonia is not excluded.      9/26/21: Xray Abdomen 1 View Portable, IMMEDIATE (Xray Abdomen 1 View Portable, IMMEDIATE .) (09.26.21 @ 15:28) : There is a nasogastric tube with its tip in the stomach. There is gaseous distention of the colon. No pathologic calcifications are seen. The osseous structures are intact with degenerative change is present in the spine.      9/17/21 : MR Foot No Cont, Right (09.17.21 @ 13:04) : Resection at the mid aspect of the fifth metatarsal. Lateral soft tissue wound with marrow signal abnormality throughout the fifth metatarsal and within the adjacent fourth metatarsal head which is nonspecific in the setting of recent surgery although osteomyelitis is suspected.    9/16/21 : Xray Foot AP + Lateral + Oblique, Right (09.16.21 @ 15:03) : No acute finding. No plain film evidence of osteomyelitis.        MICROBIOLOGY DATA:    COVID-19 PCR (10.11.21 @ 05:44)   COVID-19 PCR: NotDetec:   Urine Microscopic-Add On (NC) (09.22.21 @ 16:14)   Red Blood Cell - Urine: 0-2 /HPF   White Blood Cell - Urine: 3-5 /HPF   Bacteria: Moderate /HPF   Comment - Urine: yeast cells present   Epithelial Cells: Moderate /HPF     Culture - Tissue with Gram Stain (09.22.21 @ 13:54)   Gram Stain: No polymorphonuclear cells seen per low power field   No organisms seen per oil power field   Specimen Source: .Tissue Right 5th toe r/o osteomyeltis   Culture Results: No growth     COVID-19 David Domain Antibody (09.18.21 @ 12:55)   COVID-19 David Domain Antibody Result: >250.00:    Culture - Blood (09.17.21 @ 10:28)   Specimen Source: .Blood Blood-Peripheral   Culture Results: No growth to date.     Culture - Blood (09.17.21 @ 10:28)   Specimen Source: .Blood Blood-Venous   Culture Results: No growth to date.     Culture - Surgical Swab (09.16.21 @ 21:42)   - Amikacin: S <=16   - Amikacin: S <=16   - Amoxicillin/Clavulanic Acid: S <=8/4   - Ampicillin: R >16 These ampicillin results predict results for amoxicillin   - Ampicillin: S <=2 Predicts results to ampicillin/sulbactam, amoxacillin-clavulanate and piperacillin-tazobactam.   - Ampicillin/Sulbactam: S 8/4 Enterobacter, Citrobacter, and Serratia may develop resistance during prolonged therapy (3-4 days)   - Ampicillin/Sulbactam: R >16/8   - Aztreonam: S <=4   - Aztreonam: S <=4   - Cefazolin: R 16 Enterobacter, Citrobacter, and Serratia may develop resistance during prolonged therapy (3-4 days)   - Cefazolin: R >16   - Cefepime: S <=2   - Cefepime: S <=2   - Cefoxitin: S <=8   - Cefoxitin: S <=8   - Ceftazidime: S <=1   - Ceftriaxone: S <=1   - Ceftriaxone: S <=1 Enterobacter, Citrobacter, and Serratia may develop resistance during prolonged therapy   - Ciprofloxacin: S <=0.25   - Ciprofloxacin: S <=0.25   - Ertapenem: S <=0.5   - Gentamicin: S <=2   - Gentamicin: S <=2   - Imipenem: S <=1   - Levofloxacin: S <=0.5   - Levofloxacin: S <=0.5   - Meropenem: S <=1   - Meropenem: S <=1   - Piperacillin/Tazobactam: S <=8   - Piperacillin/Tazobactam: S <=8   - Tetra/Doxy: S 4   - Tobramycin: S <=2   - Trimethoprim/Sulfamethoxazole: S <=0.5/9.5   - Trimethoprim/Sulfamethoxazole: S <=0.5/9.5   - Vancomycin: S 2   Specimen Source: .Surgical Swab right foot wound   Culture Results:   Culture yields >4 types of aerobic and/or anaerobic bacteria   Call client services within 7 days if further workup is clinically   indicated. Culture includes   Rare Aeromonas hydrophila/caviae   Few Klebsiella oxytoca/Raoutella ornithinolytica   Few Enterococcus faecalis   Few Streptococcus agalactiae (Group B) isolated   Group B streptococci are susceptible to ampicillin,   penicillin and cefazolin, but may be resistant to   erythromycin and clindamycin.   Recommendations for intrapartum prophylaxis for Group B   streptococci are penicillin or ampicillin.   Organism Identification: Aeromonas hydrophila/caviae   Klebsiella oxytoca /Raoutella ornithinolytica   Enterococcus faecalis   Organism: Aeromonas hydrophila/caviae   Organism: Klebsiella oxytoca /Raoutella ornithinolytica   Organism: Enterococcus faecalis   COVID-19 PCR . (09.16.21 @ 22:24)   COVID-19 PCR: NotDetec:

## 2021-10-23 NOTE — PROGRESS NOTE ADULT - SUBJECTIVE AND OBJECTIVE BOX
Patient is a 76y old  Male who presents with a chief complaint of R Foot Pain (22 Oct 2021 20:13)    PATIENT IS SEEN AND EXAMINED IN MEDICAL FLOOR.  KEENANT [    ]    MADDY [   ]      GT [   ]    ALLERGIES:  No Known Allergies      Daily     Daily     VITALS:    Vital Signs Last 24 Hrs  T(C): 36.4 (23 Oct 2021 05:34), Max: 36.4 (23 Oct 2021 05:34)  T(F): 97.5 (23 Oct 2021 05:34), Max: 97.5 (23 Oct 2021 05:34)  HR: 92 (23 Oct 2021 05:34) (65 - 92)  BP: 137/64 (23 Oct 2021 05:34) (95/62 - 147/76)  BP(mean): --  RR: 16 (23 Oct 2021 05:34) (16 - 17)  SpO2: 100% (23 Oct 2021 05:34) (100% - 100%)    LABS:    CBC Full  -  ( 23 Oct 2021 06:22 )  WBC Count : 4.26 K/uL  RBC Count : 3.39 M/uL  Hemoglobin : 10.4 g/dL  Hematocrit : 30.8 %  Platelet Count - Automated : 310 K/uL  Mean Cell Volume : 90.9 fl  Mean Cell Hemoglobin : 30.7 pg  Mean Cell Hemoglobin Concentration : 33.8 gm/dL  Auto Neutrophil # : x  Auto Lymphocyte # : x  Auto Monocyte # : x  Auto Eosinophil # : x  Auto Basophil # : x  Auto Neutrophil % : x  Auto Lymphocyte % : x  Auto Monocyte % : x  Auto Eosinophil % : x  Auto Basophil % : x      10-22    140  |  105  |  12  ----------------------------<  101<H>  4.3   |  27  |  1.07    Ca    9.7      22 Oct 2021 07:07  Phos  3.5     10-22  Mg     2.3     10-22      CAPILLARY BLOOD GLUCOSE      POCT Blood Glucose.: 91 mg/dL (23 Oct 2021 11:37)  POCT Blood Glucose.: 99 mg/dL (23 Oct 2021 08:00)  POCT Blood Glucose.: 134 mg/dL (22 Oct 2021 21:20)  POCT Blood Glucose.: 126 mg/dL (22 Oct 2021 17:16)          Creatinine Trend: 1.07<--, 0.84<--, 1.00<--, 0.97<--, 0.97<--, 1.05<--  I&O's Summary    22 Oct 2021 07:01  -  23 Oct 2021 07:00  --------------------------------------------------------  IN: 0 mL / OUT: 175 mL / NET: -175 mL            .Body Fluid Pleural Fluid  10-08 @ 18:51   No growth at 1 week.  --  --      .Body Fluid Pleural Fluid  10-08 @ 18:34   No growth at 5 days  --    polymorphonuclear leukocytes seen  No organisms seen  by cytocentrifuge      .Tissue Right 5th toe r/o osteomyeltis  09-22 @ 13:54   No growth at 5 days  --    No polymorphonuclear cells seen per low power field  No organisms seen per oil power field      .Blood Blood-Venous  09-17 @ 10:28   No Growth Final  --  --      .Surgical Swab right foot wound  09-16 @ 21:42   Culture yields >4 types of aerobic and/or anaerobic bacteria  Call client services within 7 days if further workup is clinically  indicated. Culture includes  Rare Aeromonas hydrophila/caviae  Few Klebsiella oxytoca/Raoutella ornithinolytica  Few Enterococcus faecalis  Few Streptococcus agalactiae (Group B) isolated  Group B streptococci are susceptible to ampicillin,  penicillin and cefazolin, but may be resistant to  erythromycin and clindamycin.  Recommendations for intrapartum prophylaxis for Group B  streptococci are penicillin or ampicillin.  --  Aeromonas hydrophila/caviae  Klebsiella oxytoca /Raoutella ornithinolytica  Enterococcus faecalis      Bone R 5th metatarsal bone clean ma  08-20 @ 03:59   No growth at 5 days  --    No polymorphonuclear leukocytes seen per low power field  No organisms seen per oil power field      .Blood Blood-Venous  08-14 @ 21:09   No Growth Final  --  --      .Surgical Swab Right foot plantar wound  08-14 @ 21:08   Moderate Streptococcus agalactiae (Group B) isolated  Group B streptococci are susceptible to ampicillin,  penicillin and cefazolin, but may be resistant to  erythromycin and clindamycin.  Recommendations for intrapartum prophylaxis for Group B  streptococci are penicillin or ampicillin.  Moderate Bacteroides fragilis "Susceptibilities not performed"  Normal skin filiberto isolated  --  --          MEDICATIONS:    MEDICATIONS  (STANDING):  apixaban 5 milliGRAM(s) Oral two times a day  aspirin  chewable 81 milliGRAM(s) Oral daily  atorvastatin 80 milliGRAM(s) Oral at bedtime  chlorhexidine 2% Cloths 1 Application(s) Topical <User Schedule>  collagenase Ointment 1 Application(s) Topical daily  cyanocobalamin 1000 MICROGram(s) Oral daily  dextrose 5% + sodium chloride 0.45%. 1000 milliLiter(s) (75 mL/Hr) IV Continuous <Continuous>  dextrose 5%. 1000 milliLiter(s) (100 mL/Hr) IV Continuous <Continuous>  ergocalciferol 51123 Unit(s) Oral <User Schedule>  ferrous    sulfate Liquid 300 milliGRAM(s) Enteral Tube daily  finasteride 5 milliGRAM(s) Oral daily  gabapentin 100 milliGRAM(s) Oral three times a day  glucagon  Injectable 1 milliGRAM(s) IntraMuscular once  insulin lispro (ADMELOG) corrective regimen sliding scale   SubCutaneous Before meals and at bedtime  metoprolol tartrate 25 milliGRAM(s) Oral two times a day  NIFEdipine XL 60 milliGRAM(s) Oral daily  pantoprazole  Injectable 40 milliGRAM(s) IV Push at bedtime  piperacillin/tazobactam IVPB.. 3.375 Gram(s) IV Intermittent every 8 hours      MEDICATIONS  (PRN):  acetaminophen   Tablet .. 650 milliGRAM(s) Oral every 6 hours PRN Temp greater or equal to 38C (100.4F), Moderate Pain (4 - 6)  ondansetron Injectable 4 milliGRAM(s) IV Push three times a day PRN Nausea and/or Vomiting  sodium chloride 0.9% lock flush 10 milliLiter(s) IV Push every 1 hour PRN Pre/post blood products, medications, blood draw, and to maintain line patency      REVIEW OF SYSTEMS:                           ALL ROS DONE [ X   ]    CONSTITUTIONAL:  LETHARGIC [   ], FEVER [   ], UNRESPONSIVE [   ]  CVS:  CP  [   ], SOB, [   ], PALPITATIONS [   ], DIZZYNESS [   ]  RS: COUGH [   ], SPUTUM [   ]  GI: ABDOMINAL PAIN [   ], NAUSEA [   ], VOMITINGS [   ], DIARRHEA [   ], CONSTIPATION [   ]  :  DYSURIA [   ], NOCTURIA [   ], INCREASED FREQUENCY [   ], DRIBLING [   ],  SKELETAL: PAINFUL JOINTS [   ], SWOLLEN JOINTS [   ], NECK ACHE [   ], LOW BACK ACHE [   ],  SKIN : ULCERS [   ], RASH [   ], ITCHING [   ]  CNS: HEAD ACHE [   ], DOUBLE VISION [   ], BLURRED VISION [   ], AMS / CONFUSION [   ], SEIZURES [   ], WEAKNESS [   ],TINGLING / NUMBNESS [   ]    PHYSICAL EXAMINATION:    GENERAL APPEARANCE: NO DISTRESS  HEENT:  NO PALLOR, NO  JVD,  NO   NODES, NECK SUPPLE  CVS: S1 +, S2 +,   RS: AEEB,  OCCASIONAL  RALES +,  RONCHI +  ABD: SOFT, NT, NO, BS +       EXT: NO PE  SKIN: WARM,   RIGHT FOOT WRAPPED  SKELETAL:  ROM ACCEPTABLE  CNS:  AAO X  2-3        RADIOLOGY :    < from: Transthoracic Echocardiogram (10.07.21 @ 15:26) >  CONCLUSIONS:  1. Normal mitral valve. Moderate mitral regurgitation.  2. Calcified aortic valve with decreased opening, cannot  exclude bicuspid. Peak transaortic valve gradient equals  32.4 mm Hg, mean transaortic valve gradient equals 19 mm  Hg, dimensionless index 0.22, estimated aortic valve area  equals 0.6sqcm (by continuity equation), consistent with  paradoxical low-flow low-gradient severe aortic stenosis.  Consider dobutamine stress echocardiogram and/or SABIHA for  further evaluation.  No aortic valve regurgitation seen.  3. Normal aortic root.  4. Normal left atrium.  5. Moderate concentric left ventricular hypertrophy.  6. Moderate global left ventricular systolic dysfunction  (EF 40-45% by visual estimation).  7. Grade II diastolic dysfunction (moderate).  8. Normal right atrium.  9. Normal right ventricular size with decreased RV systolic  function (TAPSE 1.2 cm).  10. RV systolic pressure is borderline normal at  34 mm Hg.  11. Tricuspid valve not well seen. Trace tricuspid  regurgitation.  12. Normal pulmonic valve. Trace pulmonic insufficiency is  noted.  13. No pericardial effusion.  14. Bilateral pleural effusions.    < end of copied text >      EXAM:  CT ABDOMEN AND PELVIS OC                            PROCEDURE DATE:  09/27/2021          INTERPRETATION:  CLINICAL INFORMATION: Small bowel obstruction.    COMPARISON: None.    CONTRAST/COMPLICATIONS:  IV Contrast: NONE  Oral Contrast: Omnipaque 300  Complications: None reported at time of study completion    PROCEDURE:  CT of the Abdomen and Pelvis was performed.  Sagittal and coronal reformats were performed.    FINDINGS:  LOWER CHEST: Small bilateral pleural effusions with compressiveatelectasis of both lower lobes.    LIVER: Within normal limits.  BILE DUCTS: Normal caliber.  GALLBLADDER: Distended. Small layering gallstones.  SPLEEN: Within normal limits.  PANCREAS: Within normal limits.  ADRENALS: Within normal limits.  KIDNEYS/URETERS: No hydronephrosis. Nonobstructing left upper pole calculus, measuring 0.6 cm.    BLADDER: Urinary bladder contains air and a Castañeda catheter balloon.  REPRODUCTIVE ORGANS: Prostate is not enlarged.    BOWEL: No bowel obstruction. Appendix isnormal. Colonic diverticulosis.  PERITONEUM: Mild presacral fluid.  VESSELS: Atherosclerotic changes.  RETROPERITONEUM/LYMPH NODES: No lymphadenopathy.  ABDOMINAL WALL: Within normal limits.  BONES: Degenerative changes. Sternotomy.    IMPRESSION:  No acute intra-abdominal pathology.    Small bilateral pleural effusions with compressive atelectasis of both lower lobes.    ====================================================    EXAM:  MR FOOT RT                            PROCEDURE DATE:  09/17/2021          INTERPRETATION:  Clinical Information: Recent fifth metatarsal head resection now with fifth digit pain, swelling and foul discharge.    Comparison: Radiographs of the right foot from 9/16/2021 and MRI the right foot from 8/16/2021.    Technique:  MRI of the right midfoot and forefoot.  Intravenous Contrast: None.    Findings:    There is a resection at the mid aspect of the fifth metatarsal shaft. There is a lateral soft tissue wound beginning at the level of the amputation which extends distally. There is susceptibility artifact in the region of the amputation consistent with postoperative change. There is hyperintense T2 marrow signal throughout the remaining fifth metatarsal and within the adjacent fourth metatarsal head which is nonspecific and could be related to recent postoperative changes although osteomyelitis is suspected.    There is edema and mild atrophy within the plantar muscles of the foot. There is minimal spurring at the first metatarsophalangeal and hallux sesamoid articulations.    Impression:  Resection at the mid aspect of the fifth metatarsal. Lateral soft tissue wound with marrow signal abnormality throughout the fifth metatarsal and within the adjacent fourth metatarsal head which is nonspecific in the setting of recent surgery although osteomyelitis is suspected.        ASSESSMENT :     Headache    HTN (hypertension)    HLD (hyperlipidemia)    DM (diabetes mellitus)    CAD (coronary artery disease)    Glaucoma, angle-closure    Elim IRA (hard of hearing)      S/P CABG (coronary artery bypass graft)    History of thyroid surgery        PLAN:    HPI:  76M from home, lives with daughter w/ PMH DM on insulin, HTN, HLD, CAD, 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 Pt with sharp pain on the R lateral foot. As per daughter, pt was taking tylenol which did not help his pain prompting the patient to ask his daughter to take him to the hospital. Pt is a poor historian. Daughter states that the patient did not see his podiatrist after the surgery. No fever, chest, pain, palpitations, nausea, vomiting, diarrhea (17 Sep 2021 01:11)      # PATIENT IS S/P ICU MONITORING AND RIGHT CHEST TUBE REMOVAL ON 10/15/2021     # COVID EXPOSURE ON 10/13 AND ONCE MORE HAD RE-EXPOSURE ON 10/22 - INFECTION CONTROL IS AWARE AND ADDRESSING - ON CONTACT AND DROPLET ISOLATION PRECAUTION; F/U COVID PCR    # SUSPECT RIGHT FOOT OSTEOMYELITIS S/P RIGHT 5TH RAY RESECTION [8/19] AND S/P DEBRIDEMENT, GRAFT APPLICATION, BONE BX AND WOUND VAC APPLICATION 9/22 - BONE BX W/ ACUTE OSTEOMYELITIS AND NECROTIC PERIOSTEAL TISSUE  ** RECENT ADMISSION FOR RIGHT DIABETIC FOOT ULCER, CELLULITIS, OSTEOMYELITIS RIGHT 5th METATARSAL S/P RIGHT 5TH PARTIAL RAY AMPUTATION [8/20] - BONE PATHOLOGY W/ CLEAN MARGINS    - S/P VANCOMYCIN AND ZOSYN ; NOW ON UNASYN AND LEVAQUIN GIVEN OREN; PER ID SWITCH TO ZOSYN UNTIL 11/3   - RECENTLY HAD SIMON W/ MILD PAD  - REVIEWED WOUND CX [ ENTEROCOCCUS, AEROMONAS, KLEBSIELLA] AND BCX  - ID CONSULT, PODIATRY CONSULT    - PICC LINE PLACED TO COMPLETE 6 WEEKS OF ANTIBIOTICS - ON ZOSYN UNTIL 11/3 ON D/C    # ACUTE HYPOXIC RESPIRATORY FAILURE DUE TO ACUTE ON CHRONIC SYSTOLIC CHF  (  LV EF 40- 45 % ) , DIASTOLIC LV DYSFUNCTION , ,  SEVERE AORTIC STENOSIS & MODERATE MITRAL REGURGITATION  &  ASPIRATION PNA;  - S/P CHEST TUBE INSERTION AND REMOVAL  , S/P LASIX ,   CARDIOLOGY CONSULT IN PROGRESS      - CHEST TUBE PLACED [10/8] - FLUID CONSISTENT WITH TRANSUDATIVE PROCESS  - S/P EXTUBATION 10/13  - S/P CHEST TUBE REMOVAL 10/15      # SEPTIC SHOCK - ? S/T HYPOVOLEMIA VS. SEPSIS - S/P CVC [SWITCHED FROM FEMORAL TO LEFT IJ], S/P VASOPRESSORS - RESOLVING  - BCX - NGTD  - ON MEROPENEM      # TRANSIENT NEW ONSET A.FIB, PAROXYSMAL - ON ELIQUIS, CARDIOLOGY CONSULT IN PROGRESS    # FUNGURIA - D/C FLUCONAZOLE GIVEN TRANSAMINITIS    # TRANSAMINITIS - SUSPECT THIS IS ISCHEMIC HEPATITIS - IMPROVING - HEPATOLOGY CONSULT IN PROGRESS    # BOWEL DISTENTION - ? ILEUS - OPTIMIZING ELECTROLYTES - IMPROVED  - SURGERY CONSULT IN PROGRESS    # CHOLELITHIASIS - TRENDING LFTS, RUQ U/S - CHOLELITHIASIS, S/P SURGERY CONSULT   - S/P GI CONSULT - LESS SUSPICIOUS FOR CHOLECYSTITIS    # DYSPEPSIA - PLACED ON PPI BID WITH IMPROVEMENT, UNDERWENT ST EVAL     # ELEVATED TROPONINS - NSTEMI - ? DEMAND - WILL NEED ISCHEMIC EVAL ONCE ACUTE INFECTION RESOLVES PER CARDIOLOGY, CARDIOLOGY CONSULT IN PROGRESS  - ON ASA, STATIN, BB    # OREN ON CKD - S/T ATN AND URINARY RETENTION - S/P IVF, NOW W/ CASTAÑEDA, NEPHROLOGY CONUSLT IN PROGRESS  -  BPH ON FLOMAX, AND FINASTERIDE  - FAILED TOV WITH WORSENING RENAL FXN - NOTED URINARY RETENTION [10/4] - REPLACED CASTAÑEDA  - TOV 10/18 - BLADDER SCAN BID TO EVALUATE FOR URINARY RETENTION - FAILED TOV  - OVERNIGHT 10/18 - CASTAÑEDA REPLACED    # MEDICAL CLEARANCE FOR SURGERY - RCRI - 2 - 10.1 % 30 DAY RISK OF DEATH, MI OR CARDIAC ARREST  - CARDIOLOGY CONSULT     # DM -  HBA1C - 11  - LANTUS, SSI + FS    # CAD S/P CABG - PLACED ON ASA, STATIN, BB  - ECHO - SEVERE AORTIC STENOSIS, SEVERE CONCENTRIC LVH, G1DD, MILD CO  - CARDIOLOGY CONSULT - DR. BASSETT   - MAY NEED ISCHEMIC EVAL ONCE ACUTE ILLNESS RESOLVES INCLUDING CARDIAC CATHETERIZATION    # HTN, HLD  - ON METOPROLOL, NIFEDIPINE, STATIN       #  IMPAIRED GAIT DUE TO CERVICAL & LS SPONDYLOSIS, POLY ARTHRITIS AND DIABETIC PERIPHERAL NEUROPATHY, OP VITAMIN D DEFICIENCY - ON CHOLECALCIFEROL, OBTAIN PT & OT   #  FAILURE TO THRIVE , MODERATE PROTEIN CALORIE MALNUTRITION       # CASE DISCUSSED AT LENGTH WITH PATIENT AND DAUGHTER, BIRDIE ROBERT AT BEDSIDE AND VIA PHONE @ [phone] [10/5] - CASE DICUSSED AT LENGTH AND ALL QUESTIONS WERE ANSWERED. DAUGHTER UNDERSTOOD THAT PROGNOSIS IS GUARDED.  # PATIENT AND DAUGHTER REFUSED [facility] ON PREVIOUS ADMISSION, PREVIOUSLY WISHED FOR PATIENT RETURN HOME W/ HOME PT; HOWEVER NOW, DAUGHTER WISHES FOR PATIENT TO GO TO [facility], AS PATIENT'S WIFE IS CURRENTLY ADMITTED THERE    # GI AND DVT PPX     Patient is a 76y old  Male who presents with a chief complaint of R Foot Pain (22 Oct 2021 20:13)    PATIENT IS SEEN AND EXAMINED IN MEDICAL FLOOR.    ALLERGIES:  No Known Allergies      VITALS:    Vital Signs Last 24 Hrs  T(C): 36.4 (23 Oct 2021 05:34), Max: 36.4 (23 Oct 2021 05:34)  T(F): 97.5 (23 Oct 2021 05:34), Max: 97.5 (23 Oct 2021 05:34)  HR: 92 (23 Oct 2021 05:34) (65 - 92)  BP: 137/64 (23 Oct 2021 05:34) (95/62 - 147/76)  BP(mean): --  RR: 16 (23 Oct 2021 05:34) (16 - 17)  SpO2: 100% (23 Oct 2021 05:34) (100% - 100%)    LABS:    CBC Full  -  ( 23 Oct 2021 06:22 )  WBC Count : 4.26 K/uL  RBC Count : 3.39 M/uL  Hemoglobin : 10.4 g/dL  Hematocrit : 30.8 %  Platelet Count - Automated : 310 K/uL  Mean Cell Volume : 90.9 fl  Mean Cell Hemoglobin : 30.7 pg  Mean Cell Hemoglobin Concentration : 33.8 gm/dL  Auto Neutrophil # : x  Auto Lymphocyte # : x  Auto Monocyte # : x  Auto Eosinophil # : x  Auto Basophil # : x  Auto Neutrophil % : x  Auto Lymphocyte % : x  Auto Monocyte % : x  Auto Eosinophil % : x  Auto Basophil % : x      10-22    140  |  105  |  12  ----------------------------<  101<H>  4.3   |  27  |  1.07    Ca    9.7      22 Oct 2021 07:07  Phos  3.5     10-22  Mg     2.3     10-22      CAPILLARY BLOOD GLUCOSE      POCT Blood Glucose.: 91 mg/dL (23 Oct 2021 11:37)  POCT Blood Glucose.: 99 mg/dL (23 Oct 2021 08:00)  POCT Blood Glucose.: 134 mg/dL (22 Oct 2021 21:20)  POCT Blood Glucose.: 126 mg/dL (22 Oct 2021 17:16)          Creatinine Trend: 1.07<--, 0.84<--, 1.00<--, 0.97<--, 0.97<--, 1.05<--  I&O's Summary    22 Oct 2021 07:01  -  23 Oct 2021 07:00  --------------------------------------------------------  IN: 0 mL / OUT: 175 mL / NET: -175 mL            .Body Fluid Pleural Fluid  10-08 @ 18:51   No growth at 1 week.  --  --      .Body Fluid Pleural Fluid  10-08 @ 18:34   No growth at 5 days  --    polymorphonuclear leukocytes seen  No organisms seen  by cytocentrifuge      .Tissue Right 5th toe r/o osteomyeltis  09-22 @ 13:54   No growth at 5 days  --    No polymorphonuclear cells seen per low power field  No organisms seen per oil power field      .Blood Blood-Venous  09-17 @ 10:28   No Growth Final  --  --      .Surgical Swab right foot wound  09-16 @ 21:42   Culture yields >4 types of aerobic and/or anaerobic bacteria  Call client services within 7 days if further workup is clinically  indicated. Culture includes  Rare Aeromonas hydrophila/caviae  Few Klebsiella oxytoca/Raoutella ornithinolytica  Few Enterococcus faecalis  Few Streptococcus agalactiae (Group B) isolated  Group B streptococci are susceptible to ampicillin,  penicillin and cefazolin, but may be resistant to  erythromycin and clindamycin.  Recommendations for intrapartum prophylaxis for Group B  streptococci are penicillin or ampicillin.  --  Aeromonas hydrophila/caviae  Klebsiella oxytoca /Raoutella ornithinolytica  Enterococcus faecalis      Bone R 5th metatarsal bone clean ma  08-20 @ 03:59   No growth at 5 days  --    No polymorphonuclear leukocytes seen per low power field  No organisms seen per oil power field      .Blood Blood-Venous  08-14 @ 21:09   No Growth Final  --  --      .Surgical Swab Right foot plantar wound  08-14 @ 21:08   Moderate Streptococcus agalactiae (Group B) isolated  Group B streptococci are susceptible to ampicillin,  penicillin and cefazolin, but may be resistant to  erythromycin and clindamycin.  Recommendations for intrapartum prophylaxis for Group B  streptococci are penicillin or ampicillin.  Moderate Bacteroides fragilis "Susceptibilities not performed"  Normal skin filiberto isolated  --  --          MEDICATIONS:    MEDICATIONS  (STANDING):  apixaban 5 milliGRAM(s) Oral two times a day  aspirin  chewable 81 milliGRAM(s) Oral daily  atorvastatin 80 milliGRAM(s) Oral at bedtime  chlorhexidine 2% Cloths 1 Application(s) Topical <User Schedule>  collagenase Ointment 1 Application(s) Topical daily  cyanocobalamin 1000 MICROGram(s) Oral daily  dextrose 5% + sodium chloride 0.45%. 1000 milliLiter(s) (75 mL/Hr) IV Continuous <Continuous>  dextrose 5%. 1000 milliLiter(s) (100 mL/Hr) IV Continuous <Continuous>  ergocalciferol 32233 Unit(s) Oral <User Schedule>  ferrous    sulfate Liquid 300 milliGRAM(s) Enteral Tube daily  finasteride 5 milliGRAM(s) Oral daily  gabapentin 100 milliGRAM(s) Oral three times a day  glucagon  Injectable 1 milliGRAM(s) IntraMuscular once  insulin lispro (ADMELOG) corrective regimen sliding scale   SubCutaneous Before meals and at bedtime  metoprolol tartrate 25 milliGRAM(s) Oral two times a day  NIFEdipine XL 60 milliGRAM(s) Oral daily  pantoprazole  Injectable 40 milliGRAM(s) IV Push at bedtime  piperacillin/tazobactam IVPB.. 3.375 Gram(s) IV Intermittent every 8 hours      MEDICATIONS  (PRN):  acetaminophen   Tablet .. 650 milliGRAM(s) Oral every 6 hours PRN Temp greater or equal to 38C (100.4F), Moderate Pain (4 - 6)  ondansetron Injectable 4 milliGRAM(s) IV Push three times a day PRN Nausea and/or Vomiting  sodium chloride 0.9% lock flush 10 milliLiter(s) IV Push every 1 hour PRN Pre/post blood products, medications, blood draw, and to maintain line patency      REVIEW OF SYSTEMS:                           ALL ROS DONE [ X   ]    CONSTITUTIONAL:  LETHARGIC [   ], FEVER [   ], UNRESPONSIVE [   ]  CVS:  CP  [   ], SOB, [   ], PALPITATIONS [   ], DIZZYNESS [   ]  RS: COUGH [   ], SPUTUM [   ]  GI: ABDOMINAL PAIN [   ], NAUSEA [   ], VOMITINGS [   ], DIARRHEA [   ], CONSTIPATION [   ]  :  DYSURIA [   ], NOCTURIA [   ], INCREASED FREQUENCY [   ], DRIBLING [   ],  SKELETAL: PAINFUL JOINTS [   ], SWOLLEN JOINTS [   ], NECK ACHE [   ], LOW BACK ACHE [   ],  SKIN : ULCERS [   ], RASH [   ], ITCHING [   ]  CNS: HEAD ACHE [   ], DOUBLE VISION [   ], BLURRED VISION [   ], AMS / CONFUSION [   ], SEIZURES [   ], WEAKNESS [   ],TINGLING / NUMBNESS [   ]    PHYSICAL EXAMINATION:    GENERAL APPEARANCE: NO DISTRESS  HEENT:  NO PALLOR, NO  JVD,  NO   NODES, NECK SUPPLE  CVS: S1 +, S2 +,   RS: AEEB,  OCCASIONAL  RALES +,  RONCHI +  ABD: SOFT, NT, NO, BS +       EXT: NO PE  SKIN: WARM,   RIGHT FOOT WRAPPED  SKELETAL:  ROM ACCEPTABLE  CNS:  AAO X  2-3        RADIOLOGY :    < from: Transthoracic Echocardiogram (10.07.21 @ 15:26) >  CONCLUSIONS:  1. Normal mitral valve. Moderate mitral regurgitation.  2. Calcified aortic valve with decreased opening, cannot  exclude bicuspid. Peak transaortic valve gradient equals  32.4 mm Hg, mean transaortic valve gradient equals 19 mm  Hg, dimensionless index 0.22, estimated aortic valve area  equals 0.6sqcm (by continuity equation), consistent with  paradoxical low-flow low-gradient severe aortic stenosis.  Consider dobutamine stress echocardiogram and/or SABIHA for  further evaluation.  No aortic valve regurgitation seen.  3. Normal aortic root.  4. Normal left atrium.  5. Moderate concentric left ventricular hypertrophy.  6. Moderate global left ventricular systolic dysfunction  (EF 40-45% by visual estimation).  7. Grade II diastolic dysfunction (moderate).  8. Normal right atrium.  9. Normal right ventricular size with decreased RV systolic  function (TAPSE 1.2 cm).  10. RV systolic pressure is borderline normal at  34 mm Hg.  11. Tricuspid valve not well seen. Trace tricuspid  regurgitation.  12. Normal pulmonic valve. Trace pulmonic insufficiency is  noted.  13. No pericardial effusion.  14. Bilateral pleural effusions.    < end of copied text >      EXAM:  CT ABDOMEN AND PELVIS OC                            PROCEDURE DATE:  09/27/2021          INTERPRETATION:  CLINICAL INFORMATION: Small bowel obstruction.    COMPARISON: None.    CONTRAST/COMPLICATIONS:  IV Contrast: NONE  Oral Contrast: Omnipaque 300  Complications: None reported at time of study completion    PROCEDURE:  CT of the Abdomen and Pelvis was performed.  Sagittal and coronal reformats were performed.    FINDINGS:  LOWER CHEST: Small bilateral pleural effusions with compressiveatelectasis of both lower lobes.    LIVER: Within normal limits.  BILE DUCTS: Normal caliber.  GALLBLADDER: Distended. Small layering gallstones.  SPLEEN: Within normal limits.  PANCREAS: Within normal limits.  ADRENALS: Within normal limits.  KIDNEYS/URETERS: No hydronephrosis. Nonobstructing left upper pole calculus, measuring 0.6 cm.    BLADDER: Urinary bladder contains air and a Castañeda catheter balloon.  REPRODUCTIVE ORGANS: Prostate is not enlarged.    BOWEL: No bowel obstruction. Appendix isnormal. Colonic diverticulosis.  PERITONEUM: Mild presacral fluid.  VESSELS: Atherosclerotic changes.  RETROPERITONEUM/LYMPH NODES: No lymphadenopathy.  ABDOMINAL WALL: Within normal limits.  BONES: Degenerative changes. Sternotomy.    IMPRESSION:  No acute intra-abdominal pathology.    Small bilateral pleural effusions with compressive atelectasis of both lower lobes.    ====================================================    EXAM:  MR FOOT RT                            PROCEDURE DATE:  09/17/2021          INTERPRETATION:  Clinical Information: Recent fifth metatarsal head resection now with fifth digit pain, swelling and foul discharge.    Comparison: Radiographs of the right foot from 9/16/2021 and MRI the right foot from 8/16/2021.    Technique:  MRI of the right midfoot and forefoot.  Intravenous Contrast: None.    Findings:    There is a resection at the mid aspect of the fifth metatarsal shaft. There is a lateral soft tissue wound beginning at the level of the amputation which extends distally. There is susceptibility artifact in the region of the amputation consistent with postoperative change. There is hyperintense T2 marrow signal throughout the remaining fifth metatarsal and within the adjacent fourth metatarsal head which is nonspecific and could be related to recent postoperative changes although osteomyelitis is suspected.    There is edema and mild atrophy within the plantar muscles of the foot. There is minimal spurring at the first metatarsophalangeal and hallux sesamoid articulations.    Impression:  Resection at the mid aspect of the fifth metatarsal. Lateral soft tissue wound with marrow signal abnormality throughout the fifth metatarsal and within the adjacent fourth metatarsal head which is nonspecific in the setting of recent surgery although osteomyelitis is suspected.        ASSESSMENT :     Headache    HTN (hypertension)    HLD (hyperlipidemia)    DM (diabetes mellitus)    CAD (coronary artery disease)    Glaucoma, angle-closure    [place] (hard of hearing)      S/P CABG (coronary artery bypass graft)    History of thyroid surgery        PLAN:    HPI:  76M from home, lives with daughter w/ PMH DM on insulin, HTN, HLD, CAD, 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 Pt with sharp pain on the R lateral foot. As per daughter, pt was taking tylenol which did not help his pain prompting the patient to ask his daughter to take him to the hospital. Pt is a poor historian. Daughter states that the patient did not see his podiatrist after the surgery. No fever, chest, pain, palpitations, nausea, vomiting, diarrhea (17 Sep 2021 01:11)      # PATIENT IS S/P ICU MONITORING AND RIGHT CHEST TUBE REMOVAL ON 10/15/2021     # COVID EXPOSURE ON 10/13 AND ONCE MORE HAD RE-EXPOSURE ON 10/22 - INFECTION CONTROL IS AWARE AND ADDRESSING - ON CONTACT AND DROPLET ISOLATION PRECAUTION; F/U COVID PCR    # SUSPECT RIGHT FOOT OSTEOMYELITIS S/P RIGHT 5TH RAY RESECTION [8/19] AND S/P DEBRIDEMENT, GRAFT APPLICATION, BONE BX AND WOUND VAC APPLICATION 9/22 - BONE BX W/ ACUTE OSTEOMYELITIS AND NECROTIC PERIOSTEAL TISSUE  ** RECENT ADMISSION FOR RIGHT DIABETIC FOOT ULCER, CELLULITIS, OSTEOMYELITIS RIGHT 5th METATARSAL S/P RIGHT 5TH PARTIAL RAY AMPUTATION [8/20] - BONE PATHOLOGY W/ CLEAN MARGINS    - S/P VANCOMYCIN AND ZOSYN ; NOW ON UNASYN AND LEVAQUIN GIVEN OREN; PER ID SWITCH TO ZOSYN UNTIL 11/3   - RECENTLY HAD SIMON W/ MILD PAD  - REVIEWED WOUND CX [ ENTEROCOCCUS, AEROMONAS, KLEBSIELLA] AND BCX  - ID CONSULT, PODIATRY CONSULT    - PICC LINE PLACED TO COMPLETE 6 WEEKS OF ANTIBIOTICS - ON ZOSYN UNTIL 11/3 ON D/C    # ACUTE HYPOXIC RESPIRATORY FAILURE DUE TO ACUTE ON CHRONIC SYSTOLIC CHF  (  LV EF 40- 45 % ) , DIASTOLIC LV DYSFUNCTION , ,  SEVERE AORTIC STENOSIS & MODERATE MITRAL REGURGITATION  &  ASPIRATION PNA;  - S/P CHEST TUBE INSERTION AND REMOVAL  , S/P LASIX ,   CARDIOLOGY CONSULT IN PROGRESS      - CHEST TUBE PLACED [10/8] - FLUID CONSISTENT WITH TRANSUDATIVE PROCESS  - S/P EXTUBATION 10/13  - S/P CHEST TUBE REMOVAL 10/15      # SEPTIC SHOCK - ? S/T HYPOVOLEMIA VS. SEPSIS - S/P CVC [SWITCHED FROM FEMORAL TO LEFT IJ], S/P VASOPRESSORS - RESOLVING  - BCX - NGTD  - ON MEROPENEM      # TRANSIENT NEW ONSET A.FIB, PAROXYSMAL - ON ELIQUIS, CARDIOLOGY CONSULT IN PROGRESS    # FUNGURIA - D/C FLUCONAZOLE GIVEN TRANSAMINITIS    # TRANSAMINITIS - SUSPECT THIS IS ISCHEMIC HEPATITIS - IMPROVING - HEPATOLOGY CONSULT IN PROGRESS    # BOWEL DISTENTION - ? ILEUS - OPTIMIZING ELECTROLYTES - IMPROVED  - SURGERY CONSULT IN PROGRESS    # CHOLELITHIASIS - TRENDING LFTS, RUQ U/S - CHOLELITHIASIS, S/P SURGERY CONSULT   - S/P GI CONSULT - LESS SUSPICIOUS FOR CHOLECYSTITIS    # DYSPEPSIA - PLACED ON PPI BID WITH IMPROVEMENT, UNDERWENT ST EVAL     # ELEVATED TROPONINS - NSTEMI - ? DEMAND - WILL NEED ISCHEMIC EVAL ONCE ACUTE INFECTION RESOLVES PER CARDIOLOGY, CARDIOLOGY CONSULT IN PROGRESS  - ON ASA, STATIN, BB    # OREN ON CKD - S/T ATN AND URINARY RETENTION - S/P IVF, NOW W/ CASTAÑEDA, NEPHROLOGY CONUSLT IN PROGRESS  -  BPH ON FLOMAX, AND FINASTERIDE  - FAILED TOV WITH WORSENING RENAL FXN - NOTED URINARY RETENTION [10/4] - REPLACED CASTAÑEDA  - TOV 10/18 - BLADDER SCAN BID TO EVALUATE FOR URINARY RETENTION - FAILED TOV  - OVERNIGHT 10/18 - CASTAÑEDA REPLACED    # MEDICAL CLEARANCE FOR SURGERY - RCRI - 2 - 10.1 % 30 DAY RISK OF DEATH, MI OR CARDIAC ARREST  - CARDIOLOGY CONSULT     # DM -  HBA1C - 11  - LANTUS, SSI + FS    # CAD S/P CABG - PLACED ON ASA, STATIN, BB  - ECHO - SEVERE AORTIC STENOSIS, SEVERE CONCENTRIC LVH, G1DD, MILD ID  - CARDIOLOGY CONSULT - DR. BASSETT   - MAY NEED ISCHEMIC EVAL ONCE ACUTE ILLNESS RESOLVES INCLUDING CARDIAC CATHETERIZATION    # HTN, HLD  - ON METOPROLOL, NIFEDIPINE, STATIN       #  IMPAIRED GAIT DUE TO CERVICAL & LS SPONDYLOSIS, POLY ARTHRITIS AND DIABETIC PERIPHERAL NEUROPATHY, OP VITAMIN D DEFICIENCY - ON CHOLECALCIFEROL, OBTAIN PT & OT   #  FAILURE TO THRIVE , MODERATE PROTEIN CALORIE MALNUTRITION       # CASE DISCUSSED AT LENGTH WITH PATIENT AND DAUGHTER, BIRDIE ROBERT AT BEDSIDE AND VIA PHONE @ [phone] [10/5] - CASE DICUSSED AT LENGTH AND ALL QUESTIONS WERE ANSWERED. DAUGHTER UNDERSTOOD THAT PROGNOSIS IS GUARDED.  # PATIENT AND DAUGHTER REFUSED [facility] ON PREVIOUS ADMISSION, PREVIOUSLY WISHED FOR PATIENT RETURN HOME W/ HOME PT; HOWEVER NOW, DAUGHTER WISHES FOR PATIENT TO GO TO [facility], AS PATIENT'S WIFE IS CURRENTLY ADMITTED THERE    # GI AND DVT PPX

## 2021-10-24 LAB
GLUCOSE BLDC GLUCOMTR-MCNC: 115 MG/DL — HIGH (ref 70–99)
GLUCOSE BLDC GLUCOMTR-MCNC: 121 MG/DL — HIGH (ref 70–99)
GLUCOSE BLDC GLUCOMTR-MCNC: 131 MG/DL — HIGH (ref 70–99)
GLUCOSE BLDC GLUCOMTR-MCNC: 136 MG/DL — HIGH (ref 70–99)
GLUCOSE BLDC GLUCOMTR-MCNC: 143 MG/DL — HIGH (ref 70–99)
GLUCOSE BLDC GLUCOMTR-MCNC: 164 MG/DL — HIGH (ref 70–99)

## 2021-10-24 RX ORDER — ERGOCALCIFEROL 1.25 MG/1
50000 CAPSULE ORAL
Refills: 0 | Status: DISCONTINUED | OUTPATIENT
Start: 2021-10-24 | End: 2021-11-03

## 2021-10-24 RX ORDER — APIXABAN 2.5 MG/1
5 TABLET, FILM COATED ORAL EVERY 12 HOURS
Refills: 0 | Status: DISCONTINUED | OUTPATIENT
Start: 2021-10-24 | End: 2021-11-03

## 2021-10-24 RX ORDER — CHLORHEXIDINE GLUCONATE 213 G/1000ML
1 SOLUTION TOPICAL
Refills: 0 | Status: DISCONTINUED | OUTPATIENT
Start: 2021-10-24 | End: 2021-10-24

## 2021-10-24 RX ORDER — ONDANSETRON 8 MG/1
4 TABLET, FILM COATED ORAL THREE TIMES A DAY
Refills: 0 | Status: DISCONTINUED | OUTPATIENT
Start: 2021-10-24 | End: 2021-11-03

## 2021-10-24 RX ORDER — PANTOPRAZOLE SODIUM 20 MG/1
40 TABLET, DELAYED RELEASE ORAL AT BEDTIME
Refills: 0 | Status: DISCONTINUED | OUTPATIENT
Start: 2021-10-24 | End: 2021-10-29

## 2021-10-24 RX ADMIN — Medication 25 MILLIGRAM(S): at 05:53

## 2021-10-24 RX ADMIN — PANTOPRAZOLE SODIUM 40 MILLIGRAM(S): 20 TABLET, DELAYED RELEASE ORAL at 05:51

## 2021-10-24 RX ADMIN — GABAPENTIN 100 MILLIGRAM(S): 400 CAPSULE ORAL at 05:50

## 2021-10-24 RX ADMIN — Medication 81 MILLIGRAM(S): at 11:53

## 2021-10-24 RX ADMIN — PANTOPRAZOLE SODIUM 40 MILLIGRAM(S): 20 TABLET, DELAYED RELEASE ORAL at 21:59

## 2021-10-24 RX ADMIN — CHLORHEXIDINE GLUCONATE 1 APPLICATION(S): 213 SOLUTION TOPICAL at 07:10

## 2021-10-24 RX ADMIN — PIPERACILLIN AND TAZOBACTAM 25 GRAM(S): 4; .5 INJECTION, POWDER, LYOPHILIZED, FOR SOLUTION INTRAVENOUS at 21:59

## 2021-10-24 RX ADMIN — Medication 300 MILLIGRAM(S): at 11:52

## 2021-10-24 RX ADMIN — ERGOCALCIFEROL 50000 UNIT(S): 1.25 CAPSULE ORAL at 11:52

## 2021-10-24 RX ADMIN — PIPERACILLIN AND TAZOBACTAM 25 GRAM(S): 4; .5 INJECTION, POWDER, LYOPHILIZED, FOR SOLUTION INTRAVENOUS at 08:04

## 2021-10-24 RX ADMIN — GABAPENTIN 100 MILLIGRAM(S): 400 CAPSULE ORAL at 13:47

## 2021-10-24 RX ADMIN — PIPERACILLIN AND TAZOBACTAM 25 GRAM(S): 4; .5 INJECTION, POWDER, LYOPHILIZED, FOR SOLUTION INTRAVENOUS at 13:45

## 2021-10-24 RX ADMIN — ERGOCALCIFEROL 50000 UNIT(S): 1.25 CAPSULE ORAL at 21:59

## 2021-10-24 RX ADMIN — APIXABAN 5 MILLIGRAM(S): 2.5 TABLET, FILM COATED ORAL at 17:33

## 2021-10-24 RX ADMIN — GABAPENTIN 100 MILLIGRAM(S): 400 CAPSULE ORAL at 22:01

## 2021-10-24 RX ADMIN — Medication 1 APPLICATION(S): at 11:53

## 2021-10-24 RX ADMIN — Medication 1: at 16:54

## 2021-10-24 RX ADMIN — ATORVASTATIN CALCIUM 80 MILLIGRAM(S): 80 TABLET, FILM COATED ORAL at 21:59

## 2021-10-24 RX ADMIN — PREGABALIN 1000 MICROGRAM(S): 225 CAPSULE ORAL at 11:53

## 2021-10-24 RX ADMIN — APIXABAN 5 MILLIGRAM(S): 2.5 TABLET, FILM COATED ORAL at 05:52

## 2021-10-24 RX ADMIN — FINASTERIDE 5 MILLIGRAM(S): 5 TABLET, FILM COATED ORAL at 11:52

## 2021-10-24 RX ADMIN — Medication 60 MILLIGRAM(S): at 05:56

## 2021-10-24 RX ADMIN — Medication 25 MILLIGRAM(S): at 17:34

## 2021-10-24 NOTE — PROGRESS NOTE ADULT - SUBJECTIVE AND OBJECTIVE BOX
C A R D I O L O G Y  **********************************    DATE OF SERVICE: 10-24-21    Patient denies chest pain or shortness of breath.   Review of symptoms otherwise negative.    acetaminophen   Tablet .. 650 milliGRAM(s) Oral every 6 hours PRN  apixaban 5 milliGRAM(s) Oral every 12 hours  aspirin  chewable 81 milliGRAM(s) Oral daily  atorvastatin 80 milliGRAM(s) Oral at bedtime  collagenase Ointment 1 Application(s) Topical daily  cyanocobalamin 1000 MICROGram(s) Oral daily  dextrose 5% + sodium chloride 0.45%. 1000 milliLiter(s) IV Continuous <Continuous>  dextrose 5%. 1000 milliLiter(s) IV Continuous <Continuous>  ergocalciferol 48786 Unit(s) Oral <User Schedule>  ferrous    sulfate Liquid 300 milliGRAM(s) Enteral Tube daily  finasteride 5 milliGRAM(s) Oral daily  gabapentin 100 milliGRAM(s) Oral three times a day  glucagon  Injectable 1 milliGRAM(s) IntraMuscular once  insulin lispro (ADMELOG) corrective regimen sliding scale   SubCutaneous Before meals and at bedtime  metoprolol tartrate 25 milliGRAM(s) Oral two times a day  NIFEdipine XL 60 milliGRAM(s) Oral daily  ondansetron Injectable 4 milliGRAM(s) IV Push three times a day PRN  pantoprazole  Injectable 40 milliGRAM(s) IV Push at bedtime  piperacillin/tazobactam IVPB.. 3.375 Gram(s) IV Intermittent every 8 hours  sodium chloride 0.9% lock flush 10 milliLiter(s) IV Push every 1 hour PRN                            10.4   4.26  )-----------( 310      ( 23 Oct 2021 06:22 )             30.8       Hemoglobin: 10.4 g/dL (10-23 @ 06:22)  Hemoglobin: 11.7 g/dL (10-22 @ 07:07)  Hemoglobin: 9.6 g/dL (10-21 @ 04:56)  Hemoglobin: 9.7 g/dL (10-20 @ 07:22)            Creatinine Trend: 1.07<--, 0.84<--, 1.00<--, 0.97<--, 0.97<--, 1.05<--    COAGS:           T(C): 37 (10-24-21 @ 14:18), Max: 37 (10-24-21 @ 14:18)  HR: 77 (10-24-21 @ 14:18) (75 - 78)  BP: 156/79 (10-24-21 @ 14:18) (106/36 - 156/79)  RR: 17 (10-24-21 @ 14:18) (17 - 18)  SpO2: 95% (10-24-21 @ 14:18) (95% - 99%)  Wt(kg): --    I&O's Summary    23 Oct 2021 07:01  -  24 Oct 2021 07:00  --------------------------------------------------------  IN: 0 mL / OUT: 1101 mL / NET: -1101 mL          HEENT:  (-)icterus (-)pallor  CV: N S1 S2 1/6 TRINIDAD (+)2 Pulses B/l  Resp:  Coarse sounds B/L, normal effort  GI: (+) BS Soft, NT, ND  Lymph:  (-)Edema, (-)obvious lymphadenopathy  Skin: Warm to touch, Normal turgor  Psych: Unable to adequately  mood and affect        ASSESSMENT/PLAN: 	76y  Male PMH DM on insulin, HTN, HLD, normal lV fx moderate to severe AS 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    - crt improved  -  complete course of Abx per ID  - Echo noted, Severe AS, EF 40-45%   - He will need a SABIHA and R+L heart cath when he recovers and has no active infection  - Cont medical management of CAD  - Pt. with new onset PAF now on Eliquis  - Pig tail removed on 10/16  - Abx per ID    Zak Wilkins MD, [facility]  BEEPER [phone]

## 2021-10-24 NOTE — PROGRESS NOTE ADULT - SUBJECTIVE AND OBJECTIVE BOX
Patient is seen and examined at the bed side, is afebrile. He is doing fine, off oxygen.      REVIEW OF SYSTEMS: All other review systems are negative      ALLERGIES: No Known Allergies      Vital Signs Last 24 Hrs  T(C): 37 (24 Oct 2021 14:18), Max: 37 (24 Oct 2021 14:18)  T(F): 98.6 (24 Oct 2021 14:18), Max: 98.6 (24 Oct 2021 14:18)  HR: 77 (24 Oct 2021 14:18) (75 - 78)  BP: 156/79 (24 Oct 2021 14:18) (106/36 - 156/79)  BP(mean): --  RR: 17 (24 Oct 2021 14:18) (17 - 18)  SpO2: 95% (24 Oct 2021 14:18) (95% - 99%)      PHYSICAL EXAM:  GENERAL: Not in distress  CHEST/LUNG:  Not using accessory muscles, s/p removal of Right CT   HEART: s1 and s2 present  ABDOMEN:  Mild distended   EXTREMITIES: Right foot bandage in placed   CNS: Awake and alert       LABS: No new Labs                        10.4   4.26  )-----------( 310      ( 23 Oct 2021 06:22 )             30.8                  11.7   4.43  )-----------( 330      ( 22 Oct 2021 07:07 )             34.0         10-22    140  |  105  |  12  ----------------------------<  101<H>  4.3   |  27  |  1.07    Ca    9.7      22 Oct 2021 07:07  Phos  3.5     10-22  Mg     2.3     10-22      10-22    140  |  105  |  12  ----------------------------<  101<H>  4.3   |  27  |  1.07    Ca    9.7      22 Oct 2021 07:07  Phos  3.5     10-22  Mg     2.3     10-22    TPro  6.8  /  Alb  2.4<L>  /  TBili  0.7  /  DBili  x   /  AST  31  /  ALT  50  /  AlkPhos  130<H>  10-21      09-25    139  |  107  |  41<H>  ----------------------------<  134<H>  4.1   |  21<L>  |  4.05<H>    Ca    8.6      25 Sep 2021 06:40    TPro  6.6  /  Alb  2.3<L>  /  TBili  0.5  /  DBili  x   /  AST  25  /  ALT  15  /  AlkPhos  102  09-25        CAPILLARY BLOOD GLUCOSE  POCT Blood Glucose.: 134 mg/dL (17 Sep 2021 17:11)  POCT Blood Glucose.: 128 mg/dL (17 Sep 2021 11:06)  POCT Blood Glucose.: 157 mg/dL (17 Sep 2021 04:10)      Vancomycin Level, Trough (10.14.21 @ 11:32) : 21.8  Vancomycin Level, Trough (10.12.21 @ 12:13)   Vancomycin Level, Trough: 19.5:      MEDICATIONS  (STANDING):    apixaban 5 milliGRAM(s) Oral every 12 hours  aspirin  chewable 81 milliGRAM(s) Oral daily  atorvastatin 80 milliGRAM(s) Oral at bedtime  collagenase Ointment 1 Application(s) Topical daily  cyanocobalamin 1000 MICROGram(s) Oral daily  dextrose 5%. 1000 milliLiter(s) (100 mL/Hr) IV Continuous <Continuous>  ergocalciferol 39454 Unit(s) Oral <User Schedule>  ferrous    sulfate Liquid 300 milliGRAM(s) Enteral Tube daily  finasteride 5 milliGRAM(s) Oral daily  gabapentin 100 milliGRAM(s) Oral three times a day  glucagon  Injectable 1 milliGRAM(s) IntraMuscular once  insulin lispro (ADMELOG) corrective regimen sliding scale   SubCutaneous Before meals and at bedtime  metoprolol tartrate 25 milliGRAM(s) Oral two times a day  NIFEdipine XL 60 milliGRAM(s) Oral daily  pantoprazole  Injectable 40 milliGRAM(s) IV Push at bedtime  piperacillin/tazobactam IVPB.. 3.375 Gram(s) IV Intermittent every 8 hours        RADIOLOGY & ADDITIONAL TESTS:    10/5/21 CT Chest No Cont (10.05.21 @ 14:07) Pulmonary edema with bilateral moderate to large pleural effusions. Underlying bilateral lower lobe compressive atelectasis versus pneumonia.  Mildly enlarged mediastinal lymph nodes, possibly reactive.      10/2/21: Xray Chest 1 View- PORTABLE-Routine (Xray Chest 1 View- PORTABLE-Routine in AM.) (10.02.21 @ 09:46) There is persistent bilateral perihilar/basilar diffuse airspace disease and/or RIGHT effusion.  Cardiomegaly.  No interval change.    Collected Date/Time: 9/22/2021 08:42 EDT   Received Date/Time: 9/23/2021 09:29 EDT   Surgical Pathology Report - Auth (Verified)   Specimen(s) Submitted   1 Right 5th Toe Wound Debridement r/o Osteomyelitis   Final Diagnosis   Right fifth toe; wound debridement:   - Bone with acute osteomyelitis and necrotic periosteal tissue.     9/28/21: US Abdomen Upper Quadrant Right (09.28.21 @ 12:13) Cholelithiasis without evidence of acute cholecystitis. Note is made of right pleural effusion    9/27/21: CT Abdomen and Pelvis w/ Oral Cont (09.27.21 @ 15:53) >No acute intra-abdominal pathology.    Small bilateral pleural effusions with compressive atelectasis of both lower lobes.    9/26/21: Xray Chest 1 View-PORTABLE IMMEDIATE (Xray Chest 1 View-PORTABLE IMMEDIATE .) (09.26.21 @ 15:28) : Small bilateral pleural effusions and mild pulmonary edema. A right lower lobe pneumonia is not excluded.      9/26/21: Xray Abdomen 1 View Portable, IMMEDIATE (Xray Abdomen 1 View Portable, IMMEDIATE .) (09.26.21 @ 15:28) : There is a nasogastric tube with its tip in the stomach. There is gaseous distention of the colon. No pathologic calcifications are seen. The osseous structures are intact with degenerative change is present in the spine.      9/17/21 : MR Foot No Cont, Right (09.17.21 @ 13:04) : Resection at the mid aspect of the fifth metatarsal. Lateral soft tissue wound with marrow signal abnormality throughout the fifth metatarsal and within the adjacent fourth metatarsal head which is nonspecific in the setting of recent surgery although osteomyelitis is suspected.    9/16/21 : Xray Foot AP + Lateral + Oblique, Right (09.16.21 @ 15:03) : No acute finding. No plain film evidence of osteomyelitis.        MICROBIOLOGY DATA:    COVID-19 PCR (10.11.21 @ 05:44)   COVID-19 PCR: NotDetec:   Urine Microscopic-Add On (NC) (09.22.21 @ 16:14)   Red Blood Cell - Urine: 0-2 /HPF   White Blood Cell - Urine: 3-5 /HPF   Bacteria: Moderate /HPF   Comment - Urine: yeast cells present   Epithelial Cells: Moderate /HPF     Culture - Tissue with Gram Stain (09.22.21 @ 13:54)   Gram Stain: No polymorphonuclear cells seen per low power field   No organisms seen per oil power field   Specimen Source: .Tissue Right 5th toe r/o osteomyeltis   Culture Results: No growth     COVID-19 David Domain Antibody (09.18.21 @ 12:55)   COVID-19 David Domain Antibody Result: >250.00:    Culture - Blood (09.17.21 @ 10:28)   Specimen Source: .Blood Blood-Peripheral   Culture Results: No growth to date.     Culture - Blood (09.17.21 @ 10:28)   Specimen Source: .Blood Blood-Venous   Culture Results: No growth to date.     Culture - Surgical Swab (09.16.21 @ 21:42)   - Amikacin: S <=16   - Amikacin: S <=16   - Amoxicillin/Clavulanic Acid: S <=8/4   - Ampicillin: R >16 These ampicillin results predict results for amoxicillin   - Ampicillin: S <=2 Predicts results to ampicillin/sulbactam, amoxacillin-clavulanate and piperacillin-tazobactam.   - Ampicillin/Sulbactam: S 8/4 Enterobacter, Citrobacter, and Serratia may develop resistance during prolonged therapy (3-4 days)   - Ampicillin/Sulbactam: R >16/8   - Aztreonam: S <=4   - Aztreonam: S <=4   - Cefazolin: R 16 Enterobacter, Citrobacter, and Serratia may develop resistance during prolonged therapy (3-4 days)   - Cefazolin: R >16   - Cefepime: S <=2   - Cefepime: S <=2   - Cefoxitin: S <=8   - Cefoxitin: S <=8   - Ceftazidime: S <=1   - Ceftriaxone: S <=1   - Ceftriaxone: S <=1 Enterobacter, Citrobacter, and Serratia may develop resistance during prolonged therapy   - Ciprofloxacin: S <=0.25   - Ciprofloxacin: S <=0.25   - Ertapenem: S <=0.5   - Gentamicin: S <=2   - Gentamicin: S <=2   - Imipenem: S <=1   - Levofloxacin: S <=0.5   - Levofloxacin: S <=0.5   - Meropenem: S <=1   - Meropenem: S <=1   - Piperacillin/Tazobactam: S <=8   - Piperacillin/Tazobactam: S <=8   - Tetra/Doxy: S 4   - Tobramycin: S <=2   - Trimethoprim/Sulfamethoxazole: S <=0.5/9.5   - Trimethoprim/Sulfamethoxazole: S <=0.5/9.5   - Vancomycin: S 2   Specimen Source: .Surgical Swab right foot wound   Culture Results:   Culture yields >4 types of aerobic and/or anaerobic bacteria   Call client services within 7 days if further workup is clinically   indicated. Culture includes   Rare Aeromonas hydrophila/caviae   Few Klebsiella oxytoca/Raoutella ornithinolytica   Few Enterococcus faecalis   Few Streptococcus agalactiae (Group B) isolated   Group B streptococci are susceptible to ampicillin,   penicillin and cefazolin, but may be resistant to   erythromycin and clindamycin.   Recommendations for intrapartum prophylaxis for Group B   streptococci are penicillin or ampicillin.   Organism Identification: Aeromonas hydrophila/caviae   Klebsiella oxytoca /Raoutella ornithinolytica   Enterococcus faecalis   Organism: Aeromonas hydrophila/caviae   Organism: Klebsiella oxytoca /Raoutella ornithinolytica   Organism: Enterococcus faecalis   COVID-19 PCR . (09.16.21 @ 22:24)   COVID-19 PCR: NotDetec:               Patient is seen and examined at the bed side, is afebrile. He is doing much better.       REVIEW OF SYSTEMS: All other review systems are negative      ALLERGIES: No Known Allergies      Vital Signs Last 24 Hrs  T(C): 37 (24 Oct 2021 14:18), Max: 37 (24 Oct 2021 14:18)  T(F): 98.6 (24 Oct 2021 14:18), Max: 98.6 (24 Oct 2021 14:18)  HR: 77 (24 Oct 2021 14:18) (75 - 78)  BP: 156/79 (24 Oct 2021 14:18) (106/36 - 156/79)  BP(mean): --  RR: 17 (24 Oct 2021 14:18) (17 - 18)  SpO2: 95% (24 Oct 2021 14:18) (95% - 99%)      PHYSICAL EXAM:  GENERAL: Not in distress  CHEST/LUNG:  Not using accessory muscles, s/p removal of Right CT   HEART: s1 and s2 present  ABDOMEN:  Mild distended   EXTREMITIES: Right foot bandage in placed   CNS: Awake and alert       LABS: No new Labs                        10.4   4.26  )-----------( 310      ( 23 Oct 2021 06:22 )             30.8                  11.7   4.43  )-----------( 330      ( 22 Oct 2021 07:07 )             34.0         10-22    140  |  105  |  12  ----------------------------<  101<H>  4.3   |  27  |  1.07    Ca    9.7      22 Oct 2021 07:07  Phos  3.5     10-22  Mg     2.3     10-22      10-22    140  |  105  |  12  ----------------------------<  101<H>  4.3   |  27  |  1.07    Ca    9.7      22 Oct 2021 07:07  Phos  3.5     10-22  Mg     2.3     10-22    TPro  6.8  /  Alb  2.4<L>  /  TBili  0.7  /  DBili  x   /  AST  31  /  ALT  50  /  AlkPhos  130<H>  10-21      09-25    139  |  107  |  41<H>  ----------------------------<  134<H>  4.1   |  21<L>  |  4.05<H>    Ca    8.6      25 Sep 2021 06:40    TPro  6.6  /  Alb  2.3<L>  /  TBili  0.5  /  DBili  x   /  AST  25  /  ALT  15  /  AlkPhos  102  09-25        CAPILLARY BLOOD GLUCOSE  POCT Blood Glucose.: 134 mg/dL (17 Sep 2021 17:11)  POCT Blood Glucose.: 128 mg/dL (17 Sep 2021 11:06)  POCT Blood Glucose.: 157 mg/dL (17 Sep 2021 04:10)      Vancomycin Level, Trough (10.14.21 @ 11:32) : 21.8  Vancomycin Level, Trough (10.12.21 @ 12:13)   Vancomycin Level, Trough: 19.5:      MEDICATIONS  (STANDING):    apixaban 5 milliGRAM(s) Oral every 12 hours  aspirin  chewable 81 milliGRAM(s) Oral daily  atorvastatin 80 milliGRAM(s) Oral at bedtime  collagenase Ointment 1 Application(s) Topical daily  cyanocobalamin 1000 MICROGram(s) Oral daily  dextrose 5%. 1000 milliLiter(s) (100 mL/Hr) IV Continuous <Continuous>  ergocalciferol 76535 Unit(s) Oral <User Schedule>  ferrous    sulfate Liquid 300 milliGRAM(s) Enteral Tube daily  finasteride 5 milliGRAM(s) Oral daily  gabapentin 100 milliGRAM(s) Oral three times a day  glucagon  Injectable 1 milliGRAM(s) IntraMuscular once  insulin lispro (ADMELOG) corrective regimen sliding scale   SubCutaneous Before meals and at bedtime  metoprolol tartrate 25 milliGRAM(s) Oral two times a day  NIFEdipine XL 60 milliGRAM(s) Oral daily  pantoprazole  Injectable 40 milliGRAM(s) IV Push at bedtime  piperacillin/tazobactam IVPB.. 3.375 Gram(s) IV Intermittent every 8 hours        RADIOLOGY & ADDITIONAL TESTS:    10/5/21 CT Chest No Cont (10.05.21 @ 14:07) Pulmonary edema with bilateral moderate to large pleural effusions. Underlying bilateral lower lobe compressive atelectasis versus pneumonia.  Mildly enlarged mediastinal lymph nodes, possibly reactive.      10/2/21: Xray Chest 1 View- PORTABLE-Routine (Xray Chest 1 View- PORTABLE-Routine in AM.) (10.02.21 @ 09:46) There is persistent bilateral perihilar/basilar diffuse airspace disease and/or RIGHT effusion.  Cardiomegaly.  No interval change.    Collected Date/Time: 9/22/2021 08:42 EDT   Received Date/Time: 9/23/2021 09:29 EDT   Surgical Pathology Report - Auth (Verified)   Specimen(s) Submitted   1 Right 5th Toe Wound Debridement r/o Osteomyelitis   Final Diagnosis   Right fifth toe; wound debridement:   - Bone with acute osteomyelitis and necrotic periosteal tissue.     9/28/21: US Abdomen Upper Quadrant Right (09.28.21 @ 12:13) Cholelithiasis without evidence of acute cholecystitis. Note is made of right pleural effusion    9/27/21: CT Abdomen and Pelvis w/ Oral Cont (09.27.21 @ 15:53) >No acute intra-abdominal pathology.    Small bilateral pleural effusions with compressive atelectasis of both lower lobes.    9/26/21: Xray Chest 1 View-PORTABLE IMMEDIATE (Xray Chest 1 View-PORTABLE IMMEDIATE .) (09.26.21 @ 15:28) : Small bilateral pleural effusions and mild pulmonary edema. A right lower lobe pneumonia is not excluded.      9/26/21: Xray Abdomen 1 View Portable, IMMEDIATE (Xray Abdomen 1 View Portable, IMMEDIATE .) (09.26.21 @ 15:28) : There is a nasogastric tube with its tip in the stomach. There is gaseous distention of the colon. No pathologic calcifications are seen. The osseous structures are intact with degenerative change is present in the spine.      9/17/21 : MR Foot No Cont, Right (09.17.21 @ 13:04) : Resection at the mid aspect of the fifth metatarsal. Lateral soft tissue wound with marrow signal abnormality throughout the fifth metatarsal and within the adjacent fourth metatarsal head which is nonspecific in the setting of recent surgery although osteomyelitis is suspected.    9/16/21 : Xray Foot AP + Lateral + Oblique, Right (09.16.21 @ 15:03) : No acute finding. No plain film evidence of osteomyelitis.        MICROBIOLOGY DATA:    COVID-19 PCR (10.11.21 @ 05:44)   COVID-19 PCR: NotDetec:   Urine Microscopic-Add On (NC) (09.22.21 @ 16:14)   Red Blood Cell - Urine: 0-2 /HPF   White Blood Cell - Urine: 3-5 /HPF   Bacteria: Moderate /HPF   Comment - Urine: yeast cells present   Epithelial Cells: Moderate /HPF     Culture - Tissue with Gram Stain (09.22.21 @ 13:54)   Gram Stain: No polymorphonuclear cells seen per low power field   No organisms seen per oil power field   Specimen Source: .Tissue Right 5th toe r/o osteomyeltis   Culture Results: No growth     COVID-19 David Domain Antibody (09.18.21 @ 12:55)   COVID-19 David Domain Antibody Result: >250.00:    Culture - Blood (09.17.21 @ 10:28)   Specimen Source: .Blood Blood-Peripheral   Culture Results: No growth to date.     Culture - Blood (09.17.21 @ 10:28)   Specimen Source: .Blood Blood-Venous   Culture Results: No growth to date.     Culture - Surgical Swab (09.16.21 @ 21:42)   - Amikacin: S <=16   - Amikacin: S <=16   - Amoxicillin/Clavulanic Acid: S <=8/4   - Ampicillin: R >16 These ampicillin results predict results for amoxicillin   - Ampicillin: S <=2 Predicts results to ampicillin/sulbactam, amoxacillin-clavulanate and piperacillin-tazobactam.   - Ampicillin/Sulbactam: S 8/4 Enterobacter, Citrobacter, and Serratia may develop resistance during prolonged therapy (3-4 days)   - Ampicillin/Sulbactam: R >16/8   - Aztreonam: S <=4   - Aztreonam: S <=4   - Cefazolin: R 16 Enterobacter, Citrobacter, and Serratia may develop resistance during prolonged therapy (3-4 days)   - Cefazolin: R >16   - Cefepime: S <=2   - Cefepime: S <=2   - Cefoxitin: S <=8   - Cefoxitin: S <=8   - Ceftazidime: S <=1   - Ceftriaxone: S <=1   - Ceftriaxone: S <=1 Enterobacter, Citrobacter, and Serratia may develop resistance during prolonged therapy   - Ciprofloxacin: S <=0.25   - Ciprofloxacin: S <=0.25   - Ertapenem: S <=0.5   - Gentamicin: S <=2   - Gentamicin: S <=2   - Imipenem: S <=1   - Levofloxacin: S <=0.5   - Levofloxacin: S <=0.5   - Meropenem: S <=1   - Meropenem: S <=1   - Piperacillin/Tazobactam: S <=8   - Piperacillin/Tazobactam: S <=8   - Tetra/Doxy: S 4   - Tobramycin: S <=2   - Trimethoprim/Sulfamethoxazole: S <=0.5/9.5   - Trimethoprim/Sulfamethoxazole: S <=0.5/9.5   - Vancomycin: S 2   Specimen Source: .Surgical Swab right foot wound   Culture Results:   Culture yields >4 types of aerobic and/or anaerobic bacteria   Call client services within 7 days if further workup is clinically   indicated. Culture includes   Rare Aeromonas hydrophila/caviae   Few Klebsiella oxytoca/Raoutella ornithinolytica   Few Enterococcus faecalis   Few Streptococcus agalactiae (Group B) isolated   Group B streptococci are susceptible to ampicillin,   penicillin and cefazolin, but may be resistant to   erythromycin and clindamycin.   Recommendations for intrapartum prophylaxis for Group B   streptococci are penicillin or ampicillin.   Organism Identification: Aeromonas hydrophila/caviae   Klebsiella oxytoca /Raoutella ornithinolytica   Enterococcus faecalis   Organism: Aeromonas hydrophila/caviae   Organism: Klebsiella oxytoca /Raoutella ornithinolytica   Organism: Enterococcus faecalis   COVID-19 PCR . (09.16.21 @ 22:24)   COVID-19 PCR: NotDetec:

## 2021-10-24 NOTE — PROGRESS NOTE ADULT - ASSESSMENT
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  As per daughter, patient was taking tylenol which did not help his pain prompting the patient to ask his daughter to take him to the hospital. ON admission, he has no fever or Leukocytosis. The Xray of Right foot shows no Osteomyelitis but MRI of Right foot shows Lateral soft tissue wound with marrow signal abnormality throughout the fifth metatarsal which is consistent of Osteomyelitis. He has seen by Podiatry and wound culture has sent, Zosyn and Vancomycin has started. The ID consult requested to assist with further evaluation and antibiotic management.     # Right Fifth metatarsal DFU with drainage and Osteomyelitis- wound cx grew Enterococcus, Aeromonas and Klebsiella - Zosyn is the ideal to cover All organisms but kidney function is worsening, hence change to Unasyn and Levaquin until kidney function is improved - The pathology shows Bone with acute osteomyelitis and necrotic periosteal tissue.   # OREN- s/p urinary retention -s/p ibrahim catheter - s/p discontinue ACEI  # RLL pneumonia- most likely Aspiration, S/p Vomiting - On Unasyn  # Large bowel distension  # Candiduria- 9/22/21  # Pulmonary edema with bilateral moderate to large pleural effusions- on CT chest, 10/5/21- s/p intubation 10/7- s/p Right sided chest tube placement by CT team, 10/8/21  # COVID Exposure - PCR negative as of  10/11/21, 10/21/21    would recommend:    1. OOB to chair /PT    2. Aspiration precaution    3. Monitor  kidney function closely while on Zosyn   4. Wound care as per Podiatry  5. Continue Zosyn until 11/3/21    Attending Attestation:    Spent more than 35 minutes on total encounter, more than 50 % of the visit was spent counseling and/or coordinating care by the Attending physician.       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  As per daughter, patient was taking tylenol which did not help his pain prompting the patient to ask his daughter to take him to the hospital. ON admission, he has no fever or Leukocytosis. The Xray of Right foot shows no Osteomyelitis but MRI of Right foot shows Lateral soft tissue wound with marrow signal abnormality throughout the fifth metatarsal which is consistent of Osteomyelitis. He has seen by Podiatry and wound culture has sent, Zosyn and Vancomycin has started. The ID consult requested to assist with further evaluation and antibiotic management.     # Right Fifth metatarsal DFU with drainage and Osteomyelitis- wound cx grew Enterococcus, Aeromonas and Klebsiella - Zosyn is the ideal to cover All organisms but kidney function is worsening, hence change to Unasyn and Levaquin until kidney function is improved - The pathology shows Bone with acute osteomyelitis and necrotic periosteal tissue.   # OREN- s/p urinary retention -s/p ibrahim catheter - s/p discontinue ACEI  # RLL pneumonia- most likely Aspiration, S/p Vomiting - On Unasyn  # Large bowel distension  # Candiduria- 9/22/21  # Pulmonary edema with bilateral moderate to large pleural effusions- on CT chest, 10/5/21- s/p intubation 10/7- s/p Right sided chest tube placement by CT team, 10/8/21  # COVID Exposure - PCR negative as of  10/11/21, 10/21/21    would recommend:    1. Continue PT/OOB to chair   2. Aspiration precaution    3. Monitor  kidney function closely while on Zosyn   4. Wound care as per Podiatry  5. Continue Zosyn until 11/3/21    Attending Attestation:    Spent more than 35 minutes on total encounter, more than 50 % of the visit was spent counseling and/or coordinating care by the Attending physician.

## 2021-10-24 NOTE — PROGRESS NOTE ADULT - SUBJECTIVE AND OBJECTIVE BOX
`Patient is a 76y old  Male who presents with a chief complaint of R Foot Pain (23 Oct 2021 18:21)    PATIENT IS SEEN AND EXAMINED IN MEDICAL FLOOR.  NGT [    ]    MADDY [   ]      GT [   ]    ALLERGIES:  No Known Allergies      Daily     Daily     VITALS:    Vital Signs Last 24 Hrs  T(C): 36.4 (24 Oct 2021 05:20), Max: 36.6 (23 Oct 2021 20:56)  T(F): 97.6 (24 Oct 2021 05:20), Max: 97.8 (23 Oct 2021 20:56)  HR: 78 (24 Oct 2021 05:20) (75 - 78)  BP: 136/66 (24 Oct 2021 05:20) (106/36 - 138/79)  BP(mean): --  RR: 18 (24 Oct 2021 05:20) (18 - 18)  SpO2: 97% (24 Oct 2021 05:20) (97% - 99%)    LABS:    CBC Full  -  ( 23 Oct 2021 06:22 )  WBC Count : 4.26 K/uL  RBC Count : 3.39 M/uL  Hemoglobin : 10.4 g/dL  Hematocrit : 30.8 %  Platelet Count - Automated : 310 K/uL  Mean Cell Volume : 90.9 fl  Mean Cell Hemoglobin : 30.7 pg  Mean Cell Hemoglobin Concentration : 33.8 gm/dL  Auto Neutrophil # : x  Auto Lymphocyte # : x  Auto Monocyte # : x  Auto Eosinophil # : x  Auto Basophil # : x  Auto Neutrophil % : x  Auto Lymphocyte % : x  Auto Monocyte % : x  Auto Eosinophil % : x  Auto Basophil % : x            CAPILLARY BLOOD GLUCOSE      POCT Blood Glucose.: 131 mg/dL (24 Oct 2021 11:22)  POCT Blood Glucose.: 115 mg/dL (24 Oct 2021 08:16)  POCT Blood Glucose.: 121 mg/dL (24 Oct 2021 06:33)  POCT Blood Glucose.: 143 mg/dL (24 Oct 2021 00:01)  POCT Blood Glucose.: 150 mg/dL (23 Oct 2021 22:05)  POCT Blood Glucose.: 90 mg/dL (23 Oct 2021 17:11)          Creatinine Trend: 1.07<--, 0.84<--, 1.00<--, 0.97<--, 0.97<--, 1.05<--  I&O's Summary    23 Oct 2021 07:01  -  24 Oct 2021 07:00  --------------------------------------------------------  IN: 0 mL / OUT: 1101 mL / NET: -1101 mL            .Body Fluid Pleural Fluid  10-08 @ 18:51   No growth at 1 week.  --  --      .Body Fluid Pleural Fluid  10-08 @ 18:34   No growth at 5 days  --    polymorphonuclear leukocytes seen  No organisms seen  by cytocentrifuge      .Tissue Right 5th toe r/o osteomyeltis  09-22 @ 13:54   No growth at 5 days  --    No polymorphonuclear cells seen per low power field  No organisms seen per oil power field      .Blood Blood-Venous  09-17 @ 10:28   No Growth Final  --  --      .Surgical Swab right foot wound  09-16 @ 21:42   Culture yields >4 types of aerobic and/or anaerobic bacteria  Call client services within 7 days if further workup is clinically  indicated. Culture includes  Rare Aeromonas hydrophila/caviae  Few Klebsiella oxytoca/Raoutella ornithinolytica  Few Enterococcus faecalis  Few Streptococcus agalactiae (Group B) isolated  Group B streptococci are susceptible to ampicillin,  penicillin and cefazolin, but may be resistant to  erythromycin and clindamycin.  Recommendations for intrapartum prophylaxis for Group B  streptococci are penicillin or ampicillin.  --  Aeromonas hydrophila/caviae  Klebsiella oxytoca /Raoutella ornithinolytica  Enterococcus faecalis      Bone R 5th metatarsal bone clean ma  08-20 @ 03:59   No growth at 5 days  --    No polymorphonuclear leukocytes seen per low power field  No organisms seen per oil power field      .Blood Blood-Venous  08-14 @ 21:09   No Growth Final  --  --      .Surgical Swab Right foot plantar wound  08-14 @ 21:08   Moderate Streptococcus agalactiae (Group B) isolated  Group B streptococci are susceptible to ampicillin,  penicillin and cefazolin, but may be resistant to  erythromycin and clindamycin.  Recommendations for intrapartum prophylaxis for Group B  streptococci are penicillin or ampicillin.  Moderate Bacteroides fragilis "Susceptibilities not performed"  Normal skin filiberto isolated  --  --          MEDICATIONS:    MEDICATIONS  (STANDING):  apixaban 5 milliGRAM(s) Oral two times a day  aspirin  chewable 81 milliGRAM(s) Oral daily  atorvastatin 80 milliGRAM(s) Oral at bedtime  chlorhexidine 2% Cloths 1 Application(s) Topical <User Schedule>  collagenase Ointment 1 Application(s) Topical daily  cyanocobalamin 1000 MICROGram(s) Oral daily  dextrose 5% + sodium chloride 0.45%. 1000 milliLiter(s) (75 mL/Hr) IV Continuous <Continuous>  dextrose 5%. 1000 milliLiter(s) (100 mL/Hr) IV Continuous <Continuous>  ergocalciferol 64046 Unit(s) Oral <User Schedule>  ferrous    sulfate Liquid 300 milliGRAM(s) Enteral Tube daily  finasteride 5 milliGRAM(s) Oral daily  gabapentin 100 milliGRAM(s) Oral three times a day  glucagon  Injectable 1 milliGRAM(s) IntraMuscular once  insulin lispro (ADMELOG) corrective regimen sliding scale   SubCutaneous Before meals and at bedtime  metoprolol tartrate 25 milliGRAM(s) Oral two times a day  NIFEdipine XL 60 milliGRAM(s) Oral daily  pantoprazole  Injectable 40 milliGRAM(s) IV Push at bedtime  piperacillin/tazobactam IVPB.. 3.375 Gram(s) IV Intermittent every 8 hours      MEDICATIONS  (PRN):  acetaminophen   Tablet .. 650 milliGRAM(s) Oral every 6 hours PRN Temp greater or equal to 38C (100.4F), Moderate Pain (4 - 6)  ondansetron Injectable 4 milliGRAM(s) IV Push three times a day PRN Nausea and/or Vomiting  sodium chloride 0.9% lock flush 10 milliLiter(s) IV Push every 1 hour PRN Pre/post blood products, medications, blood draw, and to maintain line patency      REVIEW OF SYSTEMS:                           ALL ROS DONE [ X   ]    CONSTITUTIONAL:  LETHARGIC [   ], FEVER [   ], UNRESPONSIVE [   ]  CVS:  CP  [   ], SOB, [   ], PALPITATIONS [   ], DIZZYNESS [   ]  RS: COUGH [   ], SPUTUM [   ]  GI: ABDOMINAL PAIN [   ], NAUSEA [   ], VOMITINGS [   ], DIARRHEA [   ], CONSTIPATION [   ]  :  DYSURIA [   ], NOCTURIA [   ], INCREASED FREQUENCY [   ], DRIBLING [   ],  SKELETAL: PAINFUL JOINTS [   ], SWOLLEN JOINTS [   ], NECK ACHE [   ], LOW BACK ACHE [   ],  SKIN : ULCERS [   ], RASH [   ], ITCHING [   ]  CNS: HEAD ACHE [   ], DOUBLE VISION [   ], BLURRED VISION [   ], AMS / CONFUSION [   ], SEIZURES [   ], WEAKNESS [   ],TINGLING / NUMBNESS [   ]      PHYSICAL EXAMINATION:    GENERAL APPEARANCE: NO DISTRESS  HEENT:  NO PALLOR, NO  JVD,  NO   NODES, NECK SUPPLE  CVS: S1 +, S2 +,   RS: AEEB,  OCCASIONAL  RALES +,  RONCHI +  ABD: SOFT, NT, NO, BS +       EXT: NO PE  SKIN: WARM,   RIGHT FOOT WRAPPED  SKELETAL:  ROM ACCEPTABLE  CNS:  AAO X  2-3        RADIOLOGY :    < from: Transthoracic Echocardiogram (10.07.21 @ 15:26) >  CONCLUSIONS:  1. Normal mitral valve. Moderate mitral regurgitation.  2. Calcified aortic valve with decreased opening, cannot  exclude bicuspid. Peak transaortic valve gradient equals  32.4 mm Hg, mean transaortic valve gradient equals 19 mm  Hg, dimensionless index 0.22, estimated aortic valve area  equals 0.6sqcm (by continuity equation), consistent with  paradoxical low-flow low-gradient severe aortic stenosis.  Consider dobutamine stress echocardiogram and/or SABIHA for  further evaluation.  No aortic valve regurgitation seen.  3. Normal aortic root.  4. Normal left atrium.  5. Moderate concentric left ventricular hypertrophy.  6. Moderate global left ventricular systolic dysfunction  (EF 40-45% by visual estimation).  7. Grade II diastolic dysfunction (moderate).  8. Normal right atrium.  9. Normal right ventricular size with decreased RV systolic  function (TAPSE 1.2 cm).  10. RV systolic pressure is borderline normal at  34 mm Hg.  11. Tricuspid valve not well seen. Trace tricuspid  regurgitation.  12. Normal pulmonic valve. Trace pulmonic insufficiency is  noted.  13. No pericardial effusion.  14. Bilateral pleural effusions.    < end of copied text >      EXAM:  CT ABDOMEN AND PELVIS OC                            PROCEDURE DATE:  09/27/2021          INTERPRETATION:  CLINICAL INFORMATION: Small bowel obstruction.    COMPARISON: None.    CONTRAST/COMPLICATIONS:  IV Contrast: NONE  Oral Contrast: Omnipaque 300  Complications: None reported at time of study completion    PROCEDURE:  CT of the Abdomen and Pelvis was performed.  Sagittal and coronal reformats were performed.    FINDINGS:  LOWER CHEST: Small bilateral pleural effusions with compressiveatelectasis of both lower lobes.    LIVER: Within normal limits.  BILE DUCTS: Normal caliber.  GALLBLADDER: Distended. Small layering gallstones.  SPLEEN: Within normal limits.  PANCREAS: Within normal limits.  ADRENALS: Within normal limits.  KIDNEYS/URETERS: No hydronephrosis. Nonobstructing left upper pole calculus, measuring 0.6 cm.    BLADDER: Urinary bladder contains air and a Castañeda catheter balloon.  REPRODUCTIVE ORGANS: Prostate is not enlarged.    BOWEL: No bowel obstruction. Appendix isnormal. Colonic diverticulosis.  PERITONEUM: Mild presacral fluid.  VESSELS: Atherosclerotic changes.  RETROPERITONEUM/LYMPH NODES: No lymphadenopathy.  ABDOMINAL WALL: Within normal limits.  BONES: Degenerative changes. Sternotomy.    IMPRESSION:  No acute intra-abdominal pathology.    Small bilateral pleural effusions with compressive atelectasis of both lower lobes.    ====================================================    EXAM:  MR FOOT RT                            PROCEDURE DATE:  09/17/2021          INTERPRETATION:  Clinical Information: Recent fifth metatarsal head resection now with fifth digit pain, swelling and foul discharge.    Comparison: Radiographs of the right foot from 9/16/2021 and MRI the right foot from 8/16/2021.    Technique:  MRI of the right midfoot and forefoot.  Intravenous Contrast: None.    Findings:    There is a resection at the mid aspect of the fifth metatarsal shaft. There is a lateral soft tissue wound beginning at the level of the amputation which extends distally. There is susceptibility artifact in the region of the amputation consistent with postoperative change. There is hyperintense T2 marrow signal throughout the remaining fifth metatarsal and within the adjacent fourth metatarsal head which is nonspecific and could be related to recent postoperative changes although osteomyelitis is suspected.    There is edema and mild atrophy within the plantar muscles of the foot. There is minimal spurring at the first metatarsophalangeal and hallux sesamoid articulations.    Impression:  Resection at the mid aspect of the fifth metatarsal. Lateral soft tissue wound with marrow signal abnormality throughout the fifth metatarsal and within the adjacent fourth metatarsal head which is nonspecific in the setting of recent surgery although osteomyelitis is suspected.        ASSESSMENT :     Headache    HTN (hypertension)    HLD (hyperlipidemia)    DM (diabetes mellitus)    CAD (coronary artery disease)    Glaucoma, angle-closure    [place] (hard of hearing)      S/P CABG (coronary artery bypass graft)    History of thyroid surgery        PLAN:    HPI:  76M from home, lives with daughter w/ PMH DM on insulin, HTN, HLD, CAD, 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 Pt with sharp pain on the R lateral foot. As per daughter, pt was taking tylenol which did not help his pain prompting the patient to ask his daughter to take him to the hospital. Pt is a poor historian. Daughter states that the patient did not see his podiatrist after the surgery. No fever, chest, pain, palpitations, nausea, vomiting, diarrhea (17 Sep 2021 01:11)      # PATIENT IS S/P ICU MONITORING AND RIGHT CHEST TUBE REMOVAL ON 10/15/2021     # COVID EXPOSURE ON 10/13 AND ONCE MORE HAD RE-EXPOSURE ON 10/22 - INFECTION CONTROL IS AWARE AND ADDRESSING - ON CONTACT AND DROPLET ISOLATION PRECAUTION; F/U COVID PCR    # SUSPECT RIGHT FOOT OSTEOMYELITIS S/P RIGHT 5TH RAY RESECTION [8/19] AND S/P DEBRIDEMENT, GRAFT APPLICATION, BONE BX AND WOUND VAC APPLICATION 9/22 - BONE BX W/ ACUTE OSTEOMYELITIS AND NECROTIC PERIOSTEAL TISSUE  ** RECENT ADMISSION FOR RIGHT DIABETIC FOOT ULCER, CELLULITIS, OSTEOMYELITIS RIGHT 5th METATARSAL S/P RIGHT 5TH PARTIAL RAY AMPUTATION [8/20] - BONE PATHOLOGY W/ CLEAN MARGINS    - S/P VANCOMYCIN AND ZOSYN ; NOW ON UNASYN AND LEVAQUIN GIVEN OREN; PER ID SWITCH TO ZOSYN UNTIL 11/3   - RECENTLY HAD SIMON W/ MILD PAD  - REVIEWED WOUND CX [ ENTEROCOCCUS, AEROMONAS, KLEBSIELLA] AND BCX  - ID CONSULT, PODIATRY CONSULT    - PICC LINE PLACED TO COMPLETE 6 WEEKS OF ANTIBIOTICS - ON ZOSYN UNTIL 11/3 ON D/C    # ACUTE HYPOXIC RESPIRATORY FAILURE DUE TO ACUTE ON CHRONIC SYSTOLIC CHF  (  LV EF 40- 45 % ) , DIASTOLIC LV DYSFUNCTION , ,  SEVERE AORTIC STENOSIS & MODERATE MITRAL REGURGITATION  &  ASPIRATION PNA;  - S/P CHEST TUBE INSERTION AND REMOVAL  , S/P LASIX ,   CARDIOLOGY CONSULT IN PROGRESS      - CHEST TUBE PLACED [10/8] - FLUID CONSISTENT WITH TRANSUDATIVE PROCESS  - S/P EXTUBATION 10/13  - S/P CHEST TUBE REMOVAL 10/15      # SEPTIC SHOCK - ? S/T HYPOVOLEMIA VS. SEPSIS - S/P CVC [SWITCHED FROM FEMORAL TO LEFT IJ], S/P VASOPRESSORS - RESOLVING  - BCX - NGTD  - ON MEROPENEM      # TRANSIENT NEW ONSET A.FIB, PAROXYSMAL - ON ELIQUIS, CARDIOLOGY CONSULT IN PROGRESS    # FUNGURIA - D/C FLUCONAZOLE GIVEN TRANSAMINITIS    # TRANSAMINITIS - SUSPECT THIS IS ISCHEMIC HEPATITIS - IMPROVING - HEPATOLOGY CONSULT IN PROGRESS    # BOWEL DISTENTION - ? ILEUS - OPTIMIZING ELECTROLYTES - IMPROVED  - SURGERY CONSULT IN PROGRESS    # CHOLELITHIASIS - TRENDING LFTS, RUQ U/S - CHOLELITHIASIS, S/P SURGERY CONSULT   - S/P GI CONSULT - LESS SUSPICIOUS FOR CHOLECYSTITIS    # DYSPEPSIA - PLACED ON PPI BID WITH IMPROVEMENT, UNDERWENT ST EVAL     # ELEVATED TROPONINS - NSTEMI - ? DEMAND - WILL NEED ISCHEMIC EVAL ONCE ACUTE INFECTION RESOLVES PER CARDIOLOGY, CARDIOLOGY CONSULT IN PROGRESS  - ON ASA, STATIN, BB    # OREN ON CKD - S/T ATN AND URINARY RETENTION - S/P IVF, NOW W/ CASTAÑEDA, NEPHROLOGY CONUSLT IN PROGRESS  -  BPH ON FLOMAX, AND FINASTERIDE  - FAILED TOV WITH WORSENING RENAL FXN - NOTED URINARY RETENTION [10/4] - REPLACED CASTAÑEDA  - TOV 10/18 - BLADDER SCAN BID TO EVALUATE FOR URINARY RETENTION - FAILED TOV  - OVERNIGHT 10/18 - CASTAÑEDA REPLACED    # MEDICAL CLEARANCE FOR SURGERY - RCRI - 2 - 10.1 % 30 DAY RISK OF DEATH, MI OR CARDIAC ARREST  - CARDIOLOGY CONSULT     # DM -  HBA1C - 11  - LANTUS, SSI + FS    # CAD S/P CABG - PLACED ON ASA, STATIN, BB  - ECHO - SEVERE AORTIC STENOSIS, SEVERE CONCENTRIC LVH, G1DD, MILD ID  - CARDIOLOGY CONSULT - DR. BASSETT   - MAY NEED ISCHEMIC EVAL ONCE ACUTE ILLNESS RESOLVES INCLUDING CARDIAC CATHETERIZATION    # HTN, HLD  - ON METOPROLOL, NIFEDIPINE, STATIN       #  IMPAIRED GAIT DUE TO CERVICAL & LS SPONDYLOSIS, POLY ARTHRITIS AND DIABETIC PERIPHERAL NEUROPATHY, OP VITAMIN D DEFICIENCY - ON CHOLECALCIFEROL, OBTAIN PT & OT   #  FAILURE TO THRIVE , MODERATE PROTEIN CALORIE MALNUTRITION       # CASE DISCUSSED AT LENGTH WITH PATIENT AND DAUGHTER, BIRDIE RBOERT AT BEDSIDE AND VIA PHONE @ [phone] [10/5] - CASE DICUSSED AT LENGTH AND ALL QUESTIONS WERE ANSWERED. DAUGHTER UNDERSTOOD THAT PROGNOSIS IS GUARDED.  # PATIENT AND DAUGHTER REFUSED [facility] ON PREVIOUS ADMISSION, PREVIOUSLY WISHED FOR PATIENT RETURN HOME W/ HOME PT; HOWEVER NOW, DAUGHTER WISHES FOR PATIENT TO GO TO [facility], AS PATIENT'S WIFE IS CURRENTLY ADMITTED THERE    # GI AND DVT PPX     Patient is a 76y old  Male who presents with a chief complaint of R Foot Pain (23 Oct 2021 18:21)    PATIENT IS SEEN AND EXAMINED IN MEDICAL FLOOR.      ALLERGIES:  No Known Allergies      VITALS:    Vital Signs Last 24 Hrs  T(C): 36.4 (24 Oct 2021 05:20), Max: 36.6 (23 Oct 2021 20:56)  T(F): 97.6 (24 Oct 2021 05:20), Max: 97.8 (23 Oct 2021 20:56)  HR: 78 (24 Oct 2021 05:20) (75 - 78)  BP: 136/66 (24 Oct 2021 05:20) (106/36 - 138/79)  BP(mean): --  RR: 18 (24 Oct 2021 05:20) (18 - 18)  SpO2: 97% (24 Oct 2021 05:20) (97% - 99%)    LABS:    CBC Full  -  ( 23 Oct 2021 06:22 )  WBC Count : 4.26 K/uL  RBC Count : 3.39 M/uL  Hemoglobin : 10.4 g/dL  Hematocrit : 30.8 %  Platelet Count - Automated : 310 K/uL  Mean Cell Volume : 90.9 fl  Mean Cell Hemoglobin : 30.7 pg  Mean Cell Hemoglobin Concentration : 33.8 gm/dL  Auto Neutrophil # : x  Auto Lymphocyte # : x  Auto Monocyte # : x  Auto Eosinophil # : x  Auto Basophil # : x  Auto Neutrophil % : x  Auto Lymphocyte % : x  Auto Monocyte % : x  Auto Eosinophil % : x  Auto Basophil % : x            CAPILLARY BLOOD GLUCOSE      POCT Blood Glucose.: 131 mg/dL (24 Oct 2021 11:22)  POCT Blood Glucose.: 115 mg/dL (24 Oct 2021 08:16)  POCT Blood Glucose.: 121 mg/dL (24 Oct 2021 06:33)  POCT Blood Glucose.: 143 mg/dL (24 Oct 2021 00:01)  POCT Blood Glucose.: 150 mg/dL (23 Oct 2021 22:05)  POCT Blood Glucose.: 90 mg/dL (23 Oct 2021 17:11)          Creatinine Trend: 1.07<--, 0.84<--, 1.00<--, 0.97<--, 0.97<--, 1.05<--  I&O's Summary    23 Oct 2021 07:01  -  24 Oct 2021 07:00  --------------------------------------------------------  IN: 0 mL / OUT: 1101 mL / NET: -1101 mL            .Body Fluid Pleural Fluid  10-08 @ 18:51   No growth at 1 week.  --  --      .Body Fluid Pleural Fluid  10-08 @ 18:34   No growth at 5 days  --    polymorphonuclear leukocytes seen  No organisms seen  by cytocentrifuge      .Tissue Right 5th toe r/o osteomyeltis  09-22 @ 13:54   No growth at 5 days  --    No polymorphonuclear cells seen per low power field  No organisms seen per oil power field      .Blood Blood-Venous  09-17 @ 10:28   No Growth Final  --  --      .Surgical Swab right foot wound  09-16 @ 21:42   Culture yields >4 types of aerobic and/or anaerobic bacteria  Call client services within 7 days if further workup is clinically  indicated. Culture includes  Rare Aeromonas hydrophila/caviae  Few Klebsiella oxytoca/Raoutella ornithinolytica  Few Enterococcus faecalis  Few Streptococcus agalactiae (Group B) isolated  Group B streptococci are susceptible to ampicillin,  penicillin and cefazolin, but may be resistant to  erythromycin and clindamycin.  Recommendations for intrapartum prophylaxis for Group B  streptococci are penicillin or ampicillin.  --  Aeromonas hydrophila/caviae  Klebsiella oxytoca /Raoutella ornithinolytica  Enterococcus faecalis      Bone R 5th metatarsal bone clean ma  08-20 @ 03:59   No growth at 5 days  --    No polymorphonuclear leukocytes seen per low power field  No organisms seen per oil power field      .Blood Blood-Venous  08-14 @ 21:09   No Growth Final  --  --      .Surgical Swab Right foot plantar wound  08-14 @ 21:08   Moderate Streptococcus agalactiae (Group B) isolated  Group B streptococci are susceptible to ampicillin,  penicillin and cefazolin, but may be resistant to  erythromycin and clindamycin.  Recommendations for intrapartum prophylaxis for Group B  streptococci are penicillin or ampicillin.  Moderate Bacteroides fragilis "Susceptibilities not performed"  Normal skin filiberto isolated  --  --          MEDICATIONS:    MEDICATIONS  (STANDING):  apixaban 5 milliGRAM(s) Oral two times a day  aspirin  chewable 81 milliGRAM(s) Oral daily  atorvastatin 80 milliGRAM(s) Oral at bedtime  chlorhexidine 2% Cloths 1 Application(s) Topical <User Schedule>  collagenase Ointment 1 Application(s) Topical daily  cyanocobalamin 1000 MICROGram(s) Oral daily  dextrose 5% + sodium chloride 0.45%. 1000 milliLiter(s) (75 mL/Hr) IV Continuous <Continuous>  dextrose 5%. 1000 milliLiter(s) (100 mL/Hr) IV Continuous <Continuous>  ergocalciferol 64344 Unit(s) Oral <User Schedule>  ferrous    sulfate Liquid 300 milliGRAM(s) Enteral Tube daily  finasteride 5 milliGRAM(s) Oral daily  gabapentin 100 milliGRAM(s) Oral three times a day  glucagon  Injectable 1 milliGRAM(s) IntraMuscular once  insulin lispro (ADMELOG) corrective regimen sliding scale   SubCutaneous Before meals and at bedtime  metoprolol tartrate 25 milliGRAM(s) Oral two times a day  NIFEdipine XL 60 milliGRAM(s) Oral daily  pantoprazole  Injectable 40 milliGRAM(s) IV Push at bedtime  piperacillin/tazobactam IVPB.. 3.375 Gram(s) IV Intermittent every 8 hours      MEDICATIONS  (PRN):  acetaminophen   Tablet .. 650 milliGRAM(s) Oral every 6 hours PRN Temp greater or equal to 38C (100.4F), Moderate Pain (4 - 6)  ondansetron Injectable 4 milliGRAM(s) IV Push three times a day PRN Nausea and/or Vomiting  sodium chloride 0.9% lock flush 10 milliLiter(s) IV Push every 1 hour PRN Pre/post blood products, medications, blood draw, and to maintain line patency      REVIEW OF SYSTEMS:                           ALL ROS DONE [ X   ]    CONSTITUTIONAL:  LETHARGIC [   ], FEVER [   ], UNRESPONSIVE [   ]  CVS:  CP  [   ], SOB, [   ], PALPITATIONS [   ], DIZZYNESS [   ]  RS: COUGH [   ], SPUTUM [   ]  GI: ABDOMINAL PAIN [   ], NAUSEA [   ], VOMITINGS [   ], DIARRHEA [   ], CONSTIPATION [   ]  :  DYSURIA [   ], NOCTURIA [   ], INCREASED FREQUENCY [   ], DRIBLING [   ],  SKELETAL: PAINFUL JOINTS [   ], SWOLLEN JOINTS [   ], NECK ACHE [   ], LOW BACK ACHE [   ],  SKIN : ULCERS [   ], RASH [   ], ITCHING [   ]  CNS: HEAD ACHE [   ], DOUBLE VISION [   ], BLURRED VISION [   ], AMS / CONFUSION [   ], SEIZURES [   ], WEAKNESS [   ],TINGLING / NUMBNESS [   ]      PHYSICAL EXAMINATION:    GENERAL APPEARANCE: NO DISTRESS  HEENT:  NO PALLOR, NO  JVD,  NO   NODES, NECK SUPPLE  CVS: S1 +, S2 +,   RS: AEEB,  OCCASIONAL  RALES +,  RONCHI +  ABD: SOFT, NT, NO, BS +       EXT: NO PE  SKIN: WARM,   RIGHT FOOT WRAPPED  SKELETAL:  ROM ACCEPTABLE  CNS:  AAO X  2-3        RADIOLOGY :    < from: Transthoracic Echocardiogram (10.07.21 @ 15:26) >  CONCLUSIONS:  1. Normal mitral valve. Moderate mitral regurgitation.  2. Calcified aortic valve with decreased opening, cannot  exclude bicuspid. Peak transaortic valve gradient equals  32.4 mm Hg, mean transaortic valve gradient equals 19 mm  Hg, dimensionless index 0.22, estimated aortic valve area  equals 0.6sqcm (by continuity equation), consistent with  paradoxical low-flow low-gradient severe aortic stenosis.  Consider dobutamine stress echocardiogram and/or SABIHA for  further evaluation.  No aortic valve regurgitation seen.  3. Normal aortic root.  4. Normal left atrium.  5. Moderate concentric left ventricular hypertrophy.  6. Moderate global left ventricular systolic dysfunction  (EF 40-45% by visual estimation).  7. Grade II diastolic dysfunction (moderate).  8. Normal right atrium.  9. Normal right ventricular size with decreased RV systolic  function (TAPSE 1.2 cm).  10. RV systolic pressure is borderline normal at  34 mm Hg.  11. Tricuspid valve not well seen. Trace tricuspid  regurgitation.  12. Normal pulmonic valve. Trace pulmonic insufficiency is  noted.  13. No pericardial effusion.  14. Bilateral pleural effusions.    < end of copied text >      EXAM:  CT ABDOMEN AND PELVIS OC                            PROCEDURE DATE:  09/27/2021          INTERPRETATION:  CLINICAL INFORMATION: Small bowel obstruction.    COMPARISON: None.    CONTRAST/COMPLICATIONS:  IV Contrast: NONE  Oral Contrast: Omnipaque 300  Complications: None reported at time of study completion    PROCEDURE:  CT of the Abdomen and Pelvis was performed.  Sagittal and coronal reformats were performed.    FINDINGS:  LOWER CHEST: Small bilateral pleural effusions with compressiveatelectasis of both lower lobes.    LIVER: Within normal limits.  BILE DUCTS: Normal caliber.  GALLBLADDER: Distended. Small layering gallstones.  SPLEEN: Within normal limits.  PANCREAS: Within normal limits.  ADRENALS: Within normal limits.  KIDNEYS/URETERS: No hydronephrosis. Nonobstructing left upper pole calculus, measuring 0.6 cm.    BLADDER: Urinary bladder contains air and a Castañeda catheter balloon.  REPRODUCTIVE ORGANS: Prostate is not enlarged.    BOWEL: No bowel obstruction. Appendix isnormal. Colonic diverticulosis.  PERITONEUM: Mild presacral fluid.  VESSELS: Atherosclerotic changes.  RETROPERITONEUM/LYMPH NODES: No lymphadenopathy.  ABDOMINAL WALL: Within normal limits.  BONES: Degenerative changes. Sternotomy.    IMPRESSION:  No acute intra-abdominal pathology.    Small bilateral pleural effusions with compressive atelectasis of both lower lobes.    ====================================================    EXAM:  MR FOOT RT                            PROCEDURE DATE:  09/17/2021          INTERPRETATION:  Clinical Information: Recent fifth metatarsal head resection now with fifth digit pain, swelling and foul discharge.    Comparison: Radiographs of the right foot from 9/16/2021 and MRI the right foot from 8/16/2021.    Technique:  MRI of the right midfoot and forefoot.  Intravenous Contrast: None.    Findings:    There is a resection at the mid aspect of the fifth metatarsal shaft. There is a lateral soft tissue wound beginning at the level of the amputation which extends distally. There is susceptibility artifact in the region of the amputation consistent with postoperative change. There is hyperintense T2 marrow signal throughout the remaining fifth metatarsal and within the adjacent fourth metatarsal head which is nonspecific and could be related to recent postoperative changes although osteomyelitis is suspected.    There is edema and mild atrophy within the plantar muscles of the foot. There is minimal spurring at the first metatarsophalangeal and hallux sesamoid articulations.    Impression:  Resection at the mid aspect of the fifth metatarsal. Lateral soft tissue wound with marrow signal abnormality throughout the fifth metatarsal and within the adjacent fourth metatarsal head which is nonspecific in the setting of recent surgery although osteomyelitis is suspected.        ASSESSMENT :     Headache    HTN (hypertension)    HLD (hyperlipidemia)    DM (diabetes mellitus)    CAD (coronary artery disease)    Glaucoma, angle-closure    [place] (hard of hearing)      S/P CABG (coronary artery bypass graft)    History of thyroid surgery        PLAN:    HPI:  76M from home, lives with daughter w/ PMH DM on insulin, HTN, HLD, CAD, 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 Pt with sharp pain on the R lateral foot. As per daughter, pt was taking tylenol which did not help his pain prompting the patient to ask his daughter to take him to the hospital. Pt is a poor historian. Daughter states that the patient did not see his podiatrist after the surgery. No fever, chest, pain, palpitations, nausea, vomiting, diarrhea (17 Sep 2021 01:11)      # PATIENT IS S/P ICU MONITORING AND RIGHT CHEST TUBE REMOVAL ON 10/15/2021     # COVID EXPOSURE ON 10/13 AND ONCE MORE HAD RE-EXPOSURE ON 10/22 - INFECTION CONTROL IS AWARE AND ADDRESSING - ON CONTACT AND DROPLET ISOLATION PRECAUTION; F/U COVID PCR    # SUSPECT RIGHT FOOT OSTEOMYELITIS S/P RIGHT 5TH RAY RESECTION [8/19] AND S/P DEBRIDEMENT, GRAFT APPLICATION, BONE BX AND WOUND VAC APPLICATION 9/22 - BONE BX W/ ACUTE OSTEOMYELITIS AND NECROTIC PERIOSTEAL TISSUE  ** RECENT ADMISSION FOR RIGHT DIABETIC FOOT ULCER, CELLULITIS, OSTEOMYELITIS RIGHT 5th METATARSAL S/P RIGHT 5TH PARTIAL RAY AMPUTATION [8/20] - BONE PATHOLOGY W/ CLEAN MARGINS    - S/P VANCOMYCIN AND ZOSYN ; NOW ON UNASYN AND LEVAQUIN GIVEN OREN; PER ID SWITCH TO ZOSYN UNTIL 11/3   - RECENTLY HAD SIMON W/ MILD PAD  - REVIEWED WOUND CX [ ENTEROCOCCUS, AEROMONAS, KLEBSIELLA] AND BCX  - ID CONSULT, PODIATRY CONSULT    - PICC LINE PLACED TO COMPLETE 6 WEEKS OF ANTIBIOTICS - ON ZOSYN UNTIL 11/3 ON D/C    # ACUTE HYPOXIC RESPIRATORY FAILURE DUE TO ACUTE ON CHRONIC SYSTOLIC CHF  (  LV EF 40- 45 % ) , DIASTOLIC LV DYSFUNCTION , ,  SEVERE AORTIC STENOSIS & MODERATE MITRAL REGURGITATION  &  ASPIRATION PNA;  - S/P CHEST TUBE INSERTION AND REMOVAL  , S/P LASIX ,   CARDIOLOGY CONSULT IN PROGRESS      - CHEST TUBE PLACED [10/8] - FLUID CONSISTENT WITH TRANSUDATIVE PROCESS  - S/P EXTUBATION 10/13  - S/P CHEST TUBE REMOVAL 10/15      # SEPTIC SHOCK - ? S/T HYPOVOLEMIA VS. SEPSIS - S/P CVC [SWITCHED FROM FEMORAL TO LEFT IJ], S/P VASOPRESSORS - RESOLVING  - BCX - NGTD  - ON MEROPENEM      # TRANSIENT NEW ONSET A.FIB, PAROXYSMAL - ON ELIQUIS, CARDIOLOGY CONSULT IN PROGRESS    # FUNGURIA - D/C FLUCONAZOLE GIVEN TRANSAMINITIS    # TRANSAMINITIS - SUSPECT THIS IS ISCHEMIC HEPATITIS - IMPROVING - HEPATOLOGY CONSULT IN PROGRESS    # BOWEL DISTENTION - ? ILEUS - OPTIMIZING ELECTROLYTES - IMPROVED  - SURGERY CONSULT IN PROGRESS    # CHOLELITHIASIS - TRENDING LFTS, RUQ U/S - CHOLELITHIASIS, S/P SURGERY CONSULT   - S/P GI CONSULT - LESS SUSPICIOUS FOR CHOLECYSTITIS    # DYSPEPSIA - PLACED ON PPI BID WITH IMPROVEMENT, UNDERWENT ST EVAL     # ELEVATED TROPONINS - NSTEMI - ? DEMAND - WILL NEED ISCHEMIC EVAL ONCE ACUTE INFECTION RESOLVES PER CARDIOLOGY, CARDIOLOGY CONSULT IN PROGRESS  - ON ASA, STATIN, BB    # OREN ON CKD - S/T ATN AND URINARY RETENTION - S/P IVF, NOW W/ CASTAÑEDA, NEPHROLOGY CONUSLT IN PROGRESS  -  BPH ON FLOMAX, AND FINASTERIDE  - FAILED TOV WITH WORSENING RENAL FXN - NOTED URINARY RETENTION [10/4] - REPLACED CASTAÑEDA  - TOV 10/18 - BLADDER SCAN BID TO EVALUATE FOR URINARY RETENTION - FAILED TOV  - OVERNIGHT 10/18 - CASTAÑEDA REPLACED    # MEDICAL CLEARANCE FOR SURGERY - RCRI - 2 - 10.1 % 30 DAY RISK OF DEATH, MI OR CARDIAC ARREST  - CARDIOLOGY CONSULT     # DM -  HBA1C - 11  - LANTUS, SSI + FS    # CAD S/P CABG - PLACED ON ASA, STATIN, BB  - ECHO - SEVERE AORTIC STENOSIS, SEVERE CONCENTRIC LVH, G1DD, MILD AZ  - CARDIOLOGY CONSULT - DR. BASSETT   - MAY NEED ISCHEMIC EVAL ONCE ACUTE ILLNESS RESOLVES INCLUDING CARDIAC CATHETERIZATION    # HTN, HLD  - ON METOPROLOL, NIFEDIPINE, STATIN       #  IMPAIRED GAIT DUE TO CERVICAL & LS SPONDYLOSIS, POLY ARTHRITIS AND DIABETIC PERIPHERAL NEUROPATHY, OP VITAMIN D DEFICIENCY - ON CHOLECALCIFEROL, OBTAIN PT & OT   #  FAILURE TO THRIVE , MODERATE PROTEIN CALORIE MALNUTRITION       # CASE DISCUSSED AT LENGTH WITH PATIENT AND DAUGHTER, BIRDIE ROBERT AT BEDSIDE AND VIA PHONE @ [phone] [10/5] - CASE DICUSSED AT LENGTH AND ALL QUESTIONS WERE ANSWERED. DAUGHTER UNDERSTOOD THAT PROGNOSIS IS GUARDED.  # PATIENT AND DAUGHTER REFUSED [facility] ON PREVIOUS ADMISSION, PREVIOUSLY WISHED FOR PATIENT RETURN HOME W/ HOME PT; HOWEVER NOW, DAUGHTER WISHES FOR PATIENT TO GO TO [facility], AS PATIENT'S WIFE IS CURRENTLY ADMITTED THERE    # GI AND DVT PPX

## 2021-10-25 LAB
ALBUMIN SERPL ELPH-MCNC: 2.6 G/DL — LOW (ref 3.5–5)
ALP SERPL-CCNC: 125 U/L — HIGH (ref 40–120)
ALT FLD-CCNC: 34 U/L DA — SIGNIFICANT CHANGE UP (ref 10–60)
ANION GAP SERPL CALC-SCNC: 8 MMOL/L — SIGNIFICANT CHANGE UP (ref 5–17)
AST SERPL-CCNC: 27 U/L — SIGNIFICANT CHANGE UP (ref 10–40)
BILIRUB SERPL-MCNC: 0.7 MG/DL — SIGNIFICANT CHANGE UP (ref 0.2–1.2)
BUN SERPL-MCNC: 11 MG/DL — SIGNIFICANT CHANGE UP (ref 7–18)
CALCIUM SERPL-MCNC: 9.4 MG/DL — SIGNIFICANT CHANGE UP (ref 8.4–10.5)
CHLORIDE SERPL-SCNC: 107 MMOL/L — SIGNIFICANT CHANGE UP (ref 96–108)
CO2 SERPL-SCNC: 27 MMOL/L — SIGNIFICANT CHANGE UP (ref 22–31)
CREAT SERPL-MCNC: 1.22 MG/DL — SIGNIFICANT CHANGE UP (ref 0.5–1.3)
GLUCOSE BLDC GLUCOMTR-MCNC: 123 MG/DL — HIGH (ref 70–99)
GLUCOSE BLDC GLUCOMTR-MCNC: 136 MG/DL — HIGH (ref 70–99)
GLUCOSE BLDC GLUCOMTR-MCNC: 148 MG/DL — HIGH (ref 70–99)
GLUCOSE BLDC GLUCOMTR-MCNC: 172 MG/DL — HIGH (ref 70–99)
GLUCOSE SERPL-MCNC: 118 MG/DL — HIGH (ref 70–99)
HCT VFR BLD CALC: 29.8 % — LOW (ref 39–50)
HGB BLD-MCNC: 9.7 G/DL — LOW (ref 13–17)
MCHC RBC-ENTMCNC: 29.6 PG — SIGNIFICANT CHANGE UP (ref 27–34)
MCHC RBC-ENTMCNC: 32.6 GM/DL — SIGNIFICANT CHANGE UP (ref 32–36)
MCV RBC AUTO: 90.9 FL — SIGNIFICANT CHANGE UP (ref 80–100)
NRBC # BLD: 0 /100 WBCS — SIGNIFICANT CHANGE UP (ref 0–0)
PLATELET # BLD AUTO: 300 K/UL — SIGNIFICANT CHANGE UP (ref 150–400)
POTASSIUM SERPL-MCNC: 4 MMOL/L — SIGNIFICANT CHANGE UP (ref 3.5–5.3)
POTASSIUM SERPL-SCNC: 4 MMOL/L — SIGNIFICANT CHANGE UP (ref 3.5–5.3)
PROT SERPL-MCNC: 7.1 G/DL — SIGNIFICANT CHANGE UP (ref 6–8.3)
RBC # BLD: 3.28 M/UL — LOW (ref 4.2–5.8)
RBC # FLD: 14.8 % — HIGH (ref 10.3–14.5)
SODIUM SERPL-SCNC: 142 MMOL/L — SIGNIFICANT CHANGE UP (ref 135–145)
WBC # BLD: 4.3 K/UL — SIGNIFICANT CHANGE UP (ref 3.8–10.5)
WBC # FLD AUTO: 4.3 K/UL — SIGNIFICANT CHANGE UP (ref 3.8–10.5)

## 2021-10-25 RX ORDER — SODIUM CHLORIDE 9 MG/ML
1000 INJECTION, SOLUTION INTRAVENOUS
Refills: 0 | Status: DISCONTINUED | OUTPATIENT
Start: 2021-10-25 | End: 2021-10-26

## 2021-10-25 RX ADMIN — Medication 650 MILLIGRAM(S): at 13:42

## 2021-10-25 RX ADMIN — Medication 60 MILLIGRAM(S): at 06:02

## 2021-10-25 RX ADMIN — GABAPENTIN 100 MILLIGRAM(S): 400 CAPSULE ORAL at 13:41

## 2021-10-25 RX ADMIN — Medication 1 APPLICATION(S): at 18:08

## 2021-10-25 RX ADMIN — APIXABAN 5 MILLIGRAM(S): 2.5 TABLET, FILM COATED ORAL at 18:08

## 2021-10-25 RX ADMIN — PIPERACILLIN AND TAZOBACTAM 25 GRAM(S): 4; .5 INJECTION, POWDER, LYOPHILIZED, FOR SOLUTION INTRAVENOUS at 21:30

## 2021-10-25 RX ADMIN — Medication 1: at 18:08

## 2021-10-25 RX ADMIN — Medication 300 MILLIGRAM(S): at 13:41

## 2021-10-25 RX ADMIN — ATORVASTATIN CALCIUM 80 MILLIGRAM(S): 80 TABLET, FILM COATED ORAL at 21:31

## 2021-10-25 RX ADMIN — SODIUM CHLORIDE 75 MILLILITER(S): 9 INJECTION, SOLUTION INTRAVENOUS at 21:44

## 2021-10-25 RX ADMIN — GABAPENTIN 100 MILLIGRAM(S): 400 CAPSULE ORAL at 06:06

## 2021-10-25 RX ADMIN — PIPERACILLIN AND TAZOBACTAM 25 GRAM(S): 4; .5 INJECTION, POWDER, LYOPHILIZED, FOR SOLUTION INTRAVENOUS at 06:02

## 2021-10-25 RX ADMIN — PREGABALIN 1000 MICROGRAM(S): 225 CAPSULE ORAL at 13:41

## 2021-10-25 RX ADMIN — PANTOPRAZOLE SODIUM 40 MILLIGRAM(S): 20 TABLET, DELAYED RELEASE ORAL at 21:31

## 2021-10-25 RX ADMIN — GABAPENTIN 100 MILLIGRAM(S): 400 CAPSULE ORAL at 21:39

## 2021-10-25 RX ADMIN — Medication 25 MILLIGRAM(S): at 06:02

## 2021-10-25 RX ADMIN — PIPERACILLIN AND TAZOBACTAM 25 GRAM(S): 4; .5 INJECTION, POWDER, LYOPHILIZED, FOR SOLUTION INTRAVENOUS at 13:39

## 2021-10-25 RX ADMIN — APIXABAN 5 MILLIGRAM(S): 2.5 TABLET, FILM COATED ORAL at 06:02

## 2021-10-25 RX ADMIN — Medication 25 MILLIGRAM(S): at 18:12

## 2021-10-25 RX ADMIN — FINASTERIDE 5 MILLIGRAM(S): 5 TABLET, FILM COATED ORAL at 13:39

## 2021-10-25 RX ADMIN — Medication 81 MILLIGRAM(S): at 13:39

## 2021-10-25 NOTE — PROGRESS NOTE ADULT - SUBJECTIVE AND OBJECTIVE BOX
C A R D I O L O G Y  **********************************    DATE OF SERVICE: 10-25-21    Patient denies chest pain or shortness of breath.   Review of symptoms otherwise negative.    acetaminophen   Tablet .. 650 milliGRAM(s) Oral every 6 hours PRN  apixaban 5 milliGRAM(s) Oral every 12 hours  aspirin  chewable 81 milliGRAM(s) Oral daily  atorvastatin 80 milliGRAM(s) Oral at bedtime  collagenase Ointment 1 Application(s) Topical daily  cyanocobalamin 1000 MICROGram(s) Oral daily  dextrose 5%. 1000 milliLiter(s) IV Continuous <Continuous>  ergocalciferol 89940 Unit(s) Oral <User Schedule>  ferrous    sulfate Liquid 300 milliGRAM(s) Enteral Tube daily  finasteride 5 milliGRAM(s) Oral daily  gabapentin 100 milliGRAM(s) Oral three times a day  glucagon  Injectable 1 milliGRAM(s) IntraMuscular once  insulin lispro (ADMELOG) corrective regimen sliding scale   SubCutaneous Before meals and at bedtime  metoprolol tartrate 25 milliGRAM(s) Oral two times a day  NIFEdipine XL 60 milliGRAM(s) Oral daily  ondansetron Injectable 4 milliGRAM(s) IV Push three times a day PRN  pantoprazole  Injectable 40 milliGRAM(s) IV Push at bedtime  piperacillin/tazobactam IVPB.. 3.375 Gram(s) IV Intermittent every 8 hours  sodium chloride 0.9% lock flush 10 milliLiter(s) IV Push every 1 hour PRN                            9.7    4.30  )-----------( 300      ( 25 Oct 2021 08:37 )             29.8       Hemoglobin: 9.7 g/dL (10-25 @ 08:37)  Hemoglobin: 10.4 g/dL (10-23 @ 06:22)  Hemoglobin: 11.7 g/dL (10-22 @ 07:07)  Hemoglobin: 9.6 g/dL (10-21 @ 04:56)      10-25    142  |  107  |  11  ----------------------------<  118<H>  4.0   |  27  |  1.22    Ca    9.4      25 Oct 2021 08:37    TPro  7.1  /  Alb  2.6<L>  /  TBili  0.7  /  DBili  x   /  AST  27  /  ALT  34  /  AlkPhos  125<H>  10-25    Creatinine Trend: 1.22<--, 1.07<--, 0.84<--, 1.00<--, 0.97<--, 0.97<--    COAGS:           T(C): 36.8 (10-25-21 @ 05:09), Max: 37 (10-24-21 @ 14:18)  HR: 78 (10-25-21 @ 05:09) (77 - 78)  BP: 152/76 (10-25-21 @ 05:09) (142/60 - 156/79)  RR: 18 (10-25-21 @ 05:09) (17 - 18)  SpO2: 100% (10-25-21 @ 05:09) (95% - 100%)  Wt(kg): --    I&O's Summary    24 Oct 2021 07:01  -  25 Oct 2021 07:00  --------------------------------------------------------  IN: 0 mL / OUT: 1000 mL / NET: -1000 mL          HEENT:  (-)icterus (-)pallor  CV: N S1 S2 1/6 TRIN[place]AD (+)2 Pulses B/l  Resp:  Coarse sounds B/L, normal effort  GI: (+) BS Soft, NT, ND  Lymph:  (-)Edema, (-)obvious lymphadenopathy  Skin: Warm to touch, Normal turgor  Psych:  appropriate mood and affect        ASSESSMENT/PLAN: 	76y  Male PMH DM on insulin, HTN, HLD, normal lV fx moderate to severe AS 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    - crt improved  -  complete course of Abx per ID  - Echo noted, Severe AS, EF 40-45%   - He will need a SABIHA and R+L heart cath when he recovers and has no active infection  - Cont medical management of CAD  - Pt. with new onset PAF now on Eliquis  - Pig tail removed on 10/16  - Abx per ID    Zak Wilkins MD, [facility]  BEEPER [phone]

## 2021-10-25 NOTE — PROGRESS NOTE ADULT - SUBJECTIVE AND OBJECTIVE BOX
NP Note discussed with  Primary Attending    Patient is a 76y old  Male who presents with a chief complaint of R Foot Pain (25 Oct 2021 13:15)      INTERVAL HPI/OVERNIGHT EVENTS: Patient seen and examined at bedside, no new complaints    MEDICATIONS  (STANDING):  apixaban 5 milliGRAM(s) Oral every 12 hours  aspirin  chewable 81 milliGRAM(s) Oral daily  atorvastatin 80 milliGRAM(s) Oral at bedtime  collagenase Ointment 1 Application(s) Topical daily  cyanocobalamin 1000 MICROGram(s) Oral daily  dextrose 5%. 1000 milliLiter(s) (100 mL/Hr) IV Continuous <Continuous>  ergocalciferol 85335 Unit(s) Oral <User Schedule>  ferrous    sulfate Liquid 300 milliGRAM(s) Enteral Tube daily  finasteride 5 milliGRAM(s) Oral daily  gabapentin 100 milliGRAM(s) Oral three times a day  glucagon  Injectable 1 milliGRAM(s) IntraMuscular once  insulin lispro (ADMELOG) corrective regimen sliding scale   SubCutaneous Before meals and at bedtime  metoprolol tartrate 25 milliGRAM(s) Oral two times a day  NIFEdipine XL 60 milliGRAM(s) Oral daily  pantoprazole  Injectable 40 milliGRAM(s) IV Push at bedtime  piperacillin/tazobactam IVPB.. 3.375 Gram(s) IV Intermittent every 8 hours    MEDICATIONS  (PRN):  acetaminophen   Tablet .. 650 milliGRAM(s) Oral every 6 hours PRN Temp greater or equal to 38C (100.4F), Moderate Pain (4 - 6)  ondansetron Injectable 4 milliGRAM(s) IV Push three times a day PRN Nausea and/or Vomiting  sodium chloride 0.9% lock flush 10 milliLiter(s) IV Push every 1 hour PRN Pre/post blood products, medications, blood draw, and to maintain line patency      __________________________________________________  REVIEW OF SYSTEMS:    CONSTITUTIONAL: No fever,   EYES: no acute visual disturbances  NECK: No pain or stiffness  RESPIRATORY: No cough; No shortness of breath  CARDIOVASCULAR: No chest pain, no palpitations  GASTROINTESTINAL: No pain. No nausea or vomiting; No diarrhea   NEUROLOGICAL: No headache or numbness, no tremors  MUSCULOSKELETAL: No joint pain, no muscle pain  GENITOURINARY: no dysuria, no frequency, no hesitancy  PSYCHIATRY: no depression , no anxiety  ALL OTHER  ROS negative        Vital Signs Last 24 Hrs  T(C): 36.8 (25 Oct 2021 05:09), Max: 36.8 (25 Oct 2021 05:09)  T(F): 98.2 (25 Oct 2021 05:09), Max: 98.2 (25 Oct 2021 05:09)  HR: 78 (25 Oct 2021 05:09) (77 - 78)  BP: 152/76 (25 Oct 2021 05:09) (142/60 - 152/76)  BP(mean): --  RR: 18 (25 Oct 2021 05:09) (18 - 18)  SpO2: 100% (25 Oct 2021 05:09) (100% - 100%)    ________________________________________________  PHYSICAL EXAM:  GENERAL: NAD  HEENT: Normocephalic;  conjunctivae and sclerae clear; moist mucous membranes;   NECK : supple  CHEST/LUNG: Clear to auscultation bilaterally with good air entry   HEART: S1 S2  regular; no murmurs, gallops or rubs  ABDOMEN: Soft, Nontender, Nondistended; Bowel sounds present  EXTREMITIES: right foot ace bandage, no cyanosis; no edema; no calf tenderness  SKIN: right foot ulceration, perianal wound  NERVOUS SYSTEM:  Awake and alert; Oriented  to person    _________________________________________________  LABS:                        9.7    4.30  )-----------( 300      ( 25 Oct 2021 08:37 )             29.8     10-25    142  |  107  |  11  ----------------------------<  118<H>  4.0   |  27  |  1.22    Ca    9.4      25 Oct 2021 08:37    TPro  7.1  /  Alb  2.6<L>  /  TBili  0.7  /  DBili  x   /  AST  27  /  ALT  34  /  AlkPhos  125<H>  10-25        CAPILLARY BLOOD GLUCOSE      POCT Blood Glucose.: 148 mg/dL (25 Oct 2021 11:24)  POCT Blood Glucose.: 123 mg/dL (25 Oct 2021 07:49)  POCT Blood Glucose.: 136 mg/dL (24 Oct 2021 21:14)  POCT Blood Glucose.: 164 mg/dL (24 Oct 2021 16:51)        RADIOLOGY & ADDITIONAL TESTS:  < from: Xray Abdomen 1 View PORTABLE -Urgent (Xray Abdomen 1 View PORTABLE -Urgent .) (10.16.21 @ 20:25) >    EXAM:  XR ABDOMEN PORTABLE URGENT 1V                            PROCEDURE DATE:  10/16/2021          INTERPRETATION:  Suspect ileus.    AP portable supine. Prior 10/6/2021.    IMPRESSION:  Nonspecific bowel gas pattern. No definite obstruction. No opaque urinary biliary calculi. Vascular calcification. Advanced degenerative change thoracolumbar spine.      < end of copied text >    Imaging  Reviewed:  YES    Consultant(s) Notes Reviewed:   YES      Plan of care was discussed with patient and /or primary care giver; all questions and concerns were addressed

## 2021-10-25 NOTE — PROGRESS NOTE ADULT - SUBJECTIVE AND OBJECTIVE BOX
Podiatry Interval: Pt was seen resting in bed. Pt was asleep. No signs of acute distress. Pt had a wound vac on that was removed on 10/13.     Podiatry HPI: 76 year old male with a PMHx of DM, HTN, HLD, CAD to ED presents to the ED for a Right Foot s/p 5th foot partial ray resection and fillet toe flap. Patient states that he has sharp localized non-radiating pain to the Right foot 5th metatarsal. States that after his surgery, he did not see a podiatrist and he came into the ED because he saw drainage and was having pain. Denies any constitutional symptoms of N/V/C/F/SOB. Denies any other pedal complaints at this time. Denies calf pain today, bilaterally.      Medications acetaminophen   Tablet .. 650 milliGRAM(s) Oral every 6 hours PRN  apixaban 5 milliGRAM(s) Oral every 12 hours  aspirin  chewable 81 milliGRAM(s) Oral daily  atorvastatin 80 milliGRAM(s) Oral at bedtime  collagenase Ointment 1 Application(s) Topical daily  cyanocobalamin 1000 MICROGram(s) Oral daily  dextrose 5%. 1000 milliLiter(s) IV Continuous <Continuous>  ergocalciferol 08980 Unit(s) Oral <User Schedule>  ferrous    sulfate Liquid 300 milliGRAM(s) Enteral Tube daily  finasteride 5 milliGRAM(s) Oral daily  gabapentin 100 milliGRAM(s) Oral three times a day  glucagon  Injectable 1 milliGRAM(s) IntraMuscular once  insulin lispro (ADMELOG) corrective regimen sliding scale   SubCutaneous Before meals and at bedtime  metoprolol tartrate 25 milliGRAM(s) Oral two times a day  NIFEdipine XL 60 milliGRAM(s) Oral daily  ondansetron Injectable 4 milliGRAM(s) IV Push three times a day PRN  pantoprazole  Injectable 40 milliGRAM(s) IV Push at bedtime  piperacillin/tazobactam IVPB.. 3.375 Gram(s) IV Intermittent every 8 hours  sodium chloride 0.9% lock flush 10 milliLiter(s) IV Push every 1 hour PRN    FH: Family history of acute myocardial infarction (Father)    Family history of diabetes mellitus (Mother, Sibling)    Family history of hypertension (Mother, Sibling)    Family history of hyperlipidemia (Mother, Sibling)    PMH: HTN (hypertension)    HLD (hyperlipidemia)    DM (diabetes mellitus)    CAD (coronary artery disease)    Glaucoma, angle-closure    [place] (hard of hearing)       PSH: No significant past surgical history    S/P CABG (coronary artery bypass graft)    History of thyroid surgery      Labs                          9.7    4.30  )-----------( 300      ( 25 Oct 2021 08:37 )             29.8      10-25    142  |  107  |  11  ----------------------------<  118<H>  4.0   |  27  |  1.22    Ca    9.4      25 Oct 2021 08:37    TPro  7.1  /  Alb  2.6<L>  /  TBili  0.7  /  DBili  x   /  AST  27  /  ALT  34  /  AlkPhos  125<H>  10-25     Vital Signs Last 24 Hrs  T(C): 36.8 (25 Oct 2021 05:09), Max: 37 (24 Oct 2021 14:18)  T(F): 98.2 (25 Oct 2021 05:09), Max: 98.6 (24 Oct 2021 14:18)  HR: 78 (25 Oct 2021 05:09) (77 - 78)  BP: 152/76 (25 Oct 2021 05:09) (142/60 - 156/79)  BP(mean): --  RR: 18 (25 Oct 2021 05:09) (17 - 18)  SpO2: 100% (25 Oct 2021 05:09) (95% - 100%)  Sedimentation Rate, Erythrocyte: 85 mm/Hr (10-08-21 @ 03:52)  Sedimentation Rate, Erythrocyte: 108 mm/Hr (10-02-21 @ 07:02)         C-Reactive Protein, Serum: 53 mg/L (10-08-21 @ 12:10)  C-Reactive Protein, Serum: 43 mg/L (10-02-21 @ 14:05)  C-Reactive Protein, Serum: 45 mg/L (09-16-21 @ 23:33)  C-Reactive Protein, Serum: 85 mg/L (08-22-21 @ 21:30)  C-Reactive Protein, Serum: 67 mg/L (08-15-21 @ 11:55)   WBC Count: 4.30 K/uL (10-25-21 @ 08:37)    CAPILLARY BLOOD GLUCOSE      POCT Blood Glucose.: 148 mg/dL (25 Oct 2021 11:24)  POCT Blood Glucose.: 123 mg/dL (25 Oct 2021 07:49)  POCT Blood Glucose.: 136 mg/dL (24 Oct 2021 21:14)  POCT Blood Glucose.: 164 mg/dL (24 Oct 2021 16:51)      ROS: All others negative unless otherwise stated in the HPI        PHYSICAL EXAM  LE Focused:    Vasc:  DP/PT pulses were faintly palpable, bilateral. DP/PT pulses were monophasic on doppler, bilaterally. CFT<3 seconds to all digits.   Derm: Surgical site with graft in place to R 5th ray, incorporating well, granular wound bed through the graft with patches of necrotic islands noted, minimal drainage or discharge. minimal erythema and edema noted, eschar forming over the wound. Dorsal foot wound noted with tendon exposed, DTI noted to b/l heel  Neuro: Protective sensation grossly diminished, b/l  MSK: 5/5 muscle strength noted to the pedal compartments. 5th digit amputation R foot        Imaging:    3 views right foot. Prior 8/20/2021.    Status post resection of the fifth toe and distal fifth metatarsal unchanged in appearance. No focal bone lysis or unusual periosteal reaction to suggest osteomyelitis. No acute fracture or dislocation. The remainder study unchanged. No soft tissue gas.    IMPRESSION: No acute finding. No plain film evidence of osteomyelitis.      MRI R foot:     INTERPRETATION:  Clinical Information: Recent fifth metatarsal head resection now with fifth digit pain, swelling and foul discharge.    Comparison: Radiographs of the right foot from 9/16/2021 and MRI the right foot from 8/16/2021.    Technique:  MRI of the right midfoot and forefoot.  Intravenous Contrast: None.    Findings:    There is a resection at the mid aspect of the fifth metatarsal shaft. There is a lateral soft tissue wound beginning at the level of the amputation which extends distally. There is susceptibility artifact in the region of the amputation consistent with postoperative change. There is hyperintense T2 marrow signal throughout the remaining fifth metatarsal and within the adjacent fourth metatarsal head which is nonspecific and could be related to recent postoperative changes although osteomyelitis is suspected.    There is edema and mild atrophy within the plantar muscles of the foot. There is minimal spurring at the first metatarsophalangeal and hallux sesamoid articulations.    Impression:  Resection at the mid aspect of the fifth metatarsal. Lateral soft tissue wound with marrow signal abnormality throughout the fifth metatarsal and within the adjacent fourth metatarsal head which is nonspecific in the setting of recent surgery although osteomyelitis is suspected.        Culture Results:     Rare Aeromonas hydrophila/caviae   Few Klebsiella oxytoca/Raoutella ornithinolytica   Few Enterococcus faecalis   Few Streptococcus agalactiae (Group B) isolated   Group B streptococci are susceptible to ampicillin,   penicillin and cefazolin, but may be resistant to   erythromycin and clindamycin.   Recommendations for intrapartum prophylaxis for Group B   streptococci are penicillin or ampicillin. (09.16.21 @ 21:42)     Gram Stain:   No polymorphonuclear cells seen per low power field   No organisms seen per oil power field (09.22.21 @ 13:54)       Historical Values  Gram Stain:   No polymorphonuclear cells seen per low power field   No organisms seen per oil power field (09.22.21 @ 13:54)   Gram Stain:   No polymorphonuclear leukocytes seen per low power field   No organisms seen per oil power field (08.20.21 @ 03:59)

## 2021-10-25 NOTE — PROGRESS NOTE ADULT - ASSESSMENT
A:   s/p R 5th ray resection - 8/19  s/p R 5th wound debridement, graft application, bone biopsy and wound vac application 9/22       P:  Pt seen and evaluated   Right foot wound evaluated - no signs of infection noted today   Applied adaptic to the right dorsal wound  Applied adaptic and santyl to the lateral aspect of the right foot   Dressed right foot with DSD   Continue abx per ID recommendation  Continue offloading lower extremities in CAIR boots   Pod plan: Pt stable from podiatry standpoint  Continue with LWC  WCO: santyl, 4x4 gauze, rebecca and ACE to the right foot   Will follow while in house  Upon discharge, pt to f/u outpatient in clinic [phone]. Please call [phone] to make an appt   Discussed with attending Dr. Vazquez

## 2021-10-25 NOTE — PROGRESS NOTE ADULT - PROBLEM SELECTOR PLAN 8
OREN resolved  SCr 1.07  Failed TOV- will need to continue ibrahim and outpatient f/u with Urology  closely monitor SCr, as patient is restarted on Zosyn

## 2021-10-25 NOTE — CHART NOTE - NSCHARTNOTEFT_GEN_A_CORE
Assessment:   76yMalePatient is a 76y old  Male who presents with a chief complaint of R Foot Pain (25 Oct 2021 16:33)      Factors impacting intake: [ ] none [ ] nausea  [ ] vomiting [ ] diarrhea [ ] constipation  [ ]chewing problems [ ] swallowing issues  [ ] other:     Diet Presciption: Diet, Dysphagia 1 Pureed-Thin Liquids:   Consistent Carbohydrate {Evening Snacks}  DASH/TLC {Sodium & Cholesterol Restricted} (10-14-21 @ 21:08)    Intake:     Daily   % Weight Change    Pertinent Medications: MEDICATIONS  (STANDING):  apixaban 5 milliGRAM(s) Oral every 12 hours  aspirin  chewable 81 milliGRAM(s) Oral daily  atorvastatin 80 milliGRAM(s) Oral at bedtime  collagenase Ointment 1 Application(s) Topical daily  cyanocobalamin 1000 MICROGram(s) Oral daily  dextrose 5%. 1000 milliLiter(s) (100 mL/Hr) IV Continuous <Continuous>  ergocalciferol 95595 Unit(s) Oral <User Schedule>  ferrous    sulfate Liquid 300 milliGRAM(s) Enteral Tube daily  finasteride 5 milliGRAM(s) Oral daily  gabapentin 100 milliGRAM(s) Oral three times a day  glucagon  Injectable 1 milliGRAM(s) IntraMuscular once  insulin lispro (ADMELOG) corrective regimen sliding scale   SubCutaneous Before meals and at bedtime  metoprolol tartrate 25 milliGRAM(s) Oral two times a day  NIFEdipine XL 60 milliGRAM(s) Oral daily  pantoprazole  Injectable 40 milliGRAM(s) IV Push at bedtime  piperacillin/tazobactam IVPB.. 3.375 Gram(s) IV Intermittent every 8 hours    MEDICATIONS  (PRN):  acetaminophen   Tablet .. 650 milliGRAM(s) Oral every 6 hours PRN Temp greater or equal to 38C (100.4F), Moderate Pain (4 - 6)  ondansetron Injectable 4 milliGRAM(s) IV Push three times a day PRN Nausea and/or Vomiting  sodium chloride 0.9% lock flush 10 milliLiter(s) IV Push every 1 hour PRN Pre/post blood products, medications, blood draw, and to maintain line patency    Pertinent Labs: 10-25 Na142 mmol/L Glu 118 mg/dL<H> K+ 4.0 mmol/L Cr  1.22 mg/dL BUN 11 mg/dL 10-22 Phos 3.5 mg/dL 10-25 Alb 2.6 g/dL<L>     CAPILLARY BLOOD GLUCOSE      POCT Blood Glucose.: 172 mg/dL (25 Oct 2021 16:10)  POCT Blood Glucose.: 148 mg/dL (25 Oct 2021 11:24)  POCT Blood Glucose.: 123 mg/dL (25 Oct 2021 07:49)  POCT Blood Glucose.: 136 mg/dL (24 Oct 2021 21:14)  POCT Blood Glucose.: 164 mg/dL (24 Oct 2021 16:51)    Skin:     Estimated Needs:   [ ] no change since previous assessment  [ ] recalculated:     Previous Nutrition Diagnosis:   [ ] Inadequate Energy Intake [ ]Inadequate Oral Intake [ ] Excessive Energy Intake   [ ] Underweight [ ] Increased Nutrient Needs [ ] Overweight/Obesity   [ ] Altered GI Function [ ] Unintended Weight Loss [ ] Food & Nutrition Related Knowledge Deficit [ ] Malnutrition     Nutrition Diagnosis is [ ] ongoing  [ ] resolved [ ] not applicable     New Nutrition Diagnosis: [ ] not applicable       Interventions:   Recommend  [ ] Change Diet To:  [ ] Nutrition Supplement  [ ] Nutrition Support  [ ] Other:     Monitoring and Evaluation:   [ ] PO intake [ x ] Tolerance to diet prescription [ x ] weights [ x ] labs[ x ] follow up per protocol  [ ] other: Reassessment: Request to see pt. concerning "food choices for pureed diet" & decrease po intake - 10/25/21    Patient is a 76y old  Male who presents with a chief complaint of R Foot Pain (25 Oct 2021 16://33)      Factors impacting intake: [ ] none [ ] nausea  [ ] vomiting [ ] diarrhea [ ] constipation  [ X]chewing problems [ ] swallowing issues  [ X] other: diabetes, CAD, soft tissue infection of foot, new onset Afib, glaucoma, pleural effusion     Diet Prescription: Diet, Dysphagia 1 Pureed-Thin Liquids:   Consistent Carbohydrate {Evening Snacks}  DASH/ TLC {Sodium & Cholesterol Restricted} (10-14-21 @ 21:08)    Intake: Patient on airborne precaution, visited alert but [place] & at times agitated, reports of "disliked" pureed meals, has no dentures as per discussion w/RN & pt. drink 1/2 container of Nepro, 8oz at lunch meal today. Per pt. "wants to eat chicken", pt. amenable to Mechanical Soft diet, obtained food preferences - likes soup, mashed potatoes & pasta entree, also reviewed renal labs & with normal levels, encourage po intake with feeding assistance, d/w RN/NP    Daily   % Weight Change    Pertinent Medications: MEDICATIONS  (STANDING):  apixaban 5 milliGRAM(s) Oral every 12 hours  aspirin  chewable 81 milliGRAM(s) Oral daily  atorvastatin 80 milliGRAM(s) Oral at bedtime  collagenase Ointment 1 Application(s) Topical daily  cyanocobalamin 1000 MICROGram(s) Oral daily  dextrose 5%. 1000 milliLiter(s) (100 mL/Hr) IV Continuous <Continuous>  ergocalciferol 73338 Unit(s) Oral <User Schedule>  ferrous    sulfate Liquid 300 milliGRAM(s) Enteral Tube daily  finasteride 5 milliGRAM(s) Oral daily  gabapentin 100 milliGRAM(s) Oral three times a day  glucagon  Injectable 1 milliGRAM(s) IntraMuscular once  insulin lispro (ADMELOG) corrective regimen sliding scale   SubCutaneous Before meals and at bedtime  metoprolol tartrate 25 milliGRAM(s) Oral two times a day  NIFEdipine XL 60 milliGRAM(s) Oral daily  pantoprazole  Injectable 40 milliGRAM(s) IV Push at bedtime  piperacillin/tazobactam IVPB.. 3.375 Gram(s) IV Intermittent every 8 hours    MEDICATIONS  (PRN):  acetaminophen   Tablet .. 650 milliGRAM(s) Oral every 6 hours PRN Temp greater or equal to 38C (100.4F), Moderate Pain (4 - 6)  ondansetron Injectable 4 milliGRAM(s) IV Push three times a day PRN Nausea and/or Vomiting  sodium chloride 0.9% lock flush 10 milliLiter(s) IV Push every 1 hour PRN Pre/post blood products, medications, blood draw, and to maintain line patency    Pertinent Labs: 10-25 Na142 mmol/L Glu 118 mg/dL<H> K+ 4.0 mmol/L Cr  1.22 mg/dL BUN 11 mg/dL 10-22 Phos 3.5 mg/dL 10-25 Alb 2.6 g/dL<L>     CAPILLARY BLOOD GLUCOSE      POCT Blood Glucose.: 172 mg/dL (25 Oct 2021 16:10)  POCT Blood Glucose.: 148 mg/dL (25 Oct 2021 11:24)  POCT Blood Glucose.: 123 mg/dL (25 Oct 2021 07:49)  POCT Blood Glucose.: 136 mg/dL (24 Oct 2021 21:14)  POCT Blood Glucose.: 164 mg/dL (24 Oct 2021 16:51)    Skin:     Estimated Needs:   [ ] no change since previous assessment  [ ] recalculated:     Previous Nutrition Diagnosis:   [ ] Inadequate Energy Intake [ ]Inadequate Oral Intake [ ] Excessive Energy Intake   [ ] Underweight [ ] Increased Nutrient Needs [ ] Overweight/Obesity   [ ] Altered GI Function [ ] Unintended Weight Loss [ ] Food & Nutrition Related Knowledge Deficit [ ] Malnutrition     Nutrition Diagnosis is [ ] ongoing  [ ] resolved [ ] not applicable     New Nutrition Diagnosis: [ ] not applicable       Interventions:   Recommend  [ ] Change Diet To:  [ ] Nutrition Supplement  [ ] Nutrition Support  [ ] Other:     Monitoring and Evaluation:   [ ] PO intake [ x ] Tolerance to diet prescription [ x ] weights [ x ] labs[ x ] follow up per protocol  [ ] other: Reassessment: Request to see pt. concerning "food choices for pureed diet" & decrease po intake - 10/25/21    Patient is a 76y old  Male who presents with a chief complaint of R Foot Pain (25 Oct 2021 16://33)      Factors impacting intake: [ ] none [ ] nausea  [ ] vomiting [ ] diarrhea [ ] constipation  [ X]chewing problems [ ] swallowing issues  [ X] other: diabetes, CAD, soft tissue infection of foot, new onset Afib, glaucoma, pleural effusion     Diet Prescription: Diet, Dysphagia 1 Pureed-Thin Liquids:   Consistent Carbohydrate {Evening Snacks}  DASH/ TLC {Sodium & Cholesterol Restricted} (10-14-21 @ 21:08)    Intake: Patient on airborne precaution, visited alert but [place] & at times agitated, reports of "disliked" pureed meals, has no dentures as per discussion w/RN, "ate vert little of pureed entree" pt. drink 1/2 container of Nepro, 8oz at lunch meal today. Per pt. "wants to eat chicken", pt. amenable to Mechanical Soft diet, obtained food preferences - likes soup, mashed potatoes & pasta entree, also reviewed renal labs & within normal levels, updated kitchen/Diet Tech. D/w RN/NP, rec. changing diet to Mechanical Soft w/consistent carbohydrate (evening snack) DASH/TLC diet & Add Glucerna Shake 1can bid as medically feasible (440kcal, 20g protein), Nursing to continue feeding assistance and encouragement, aspiration precaution. Per flow sheet intake 50% on 10/25/21 noted. Pt. s/p R 5th wound debridement, graft application, bone biopsy and wound vac. application 9/22, Podiatry following. Speech/Swallow evaluation on 10/14/21 noted. RD available.     Daily weight: 171.5 Lbs on 10/18/21  % Weight Change: Nursing to monitor daily wt.    Pertinent Medications: MEDICATIONS  (STANDING):  apixaban 5 milliGRAM(s) Oral every 12 hours  aspirin  chewable 81 milliGRAM(s) Oral daily  atorvastatin 80 milliGRAM(s) Oral at bedtime  collagenase Ointment 1 Application(s) Topical daily  cyanocobalamin 1000 MICROGram(s) Oral daily  dextrose 5%. 1000 milliLiter(s) (100 mL/Hr) IV Continuous <Continuous>  ergocalciferol 54726 Unit(s) Oral <User Schedule>  ferrous    sulfate Liquid 300 milliGRAM(s) Enteral Tube daily  finasteride 5 milliGRAM(s) Oral daily  gabapentin 100 milliGRAM(s) Oral three times a day  glucagon  Injectable 1 milliGRAM(s) IntraMuscular once  insulin lispro (ADMELOG) corrective regimen sliding scale   SubCutaneous Before meals and at bedtime  metoprolol tartrate 25 milliGRAM(s) Oral two times a day  NIFEdipine XL 60 milliGRAM(s) Oral daily  pantoprazole  Injectable 40 milliGRAM(s) IV Push at bedtime  piperacillin/tazobactam IVPB.. 3.375 Gram(s) IV Intermittent every 8 hours    MEDICATIONS  (PRN):  acetaminophen   Tablet .. 650 milliGRAM(s) Oral every 6 hours PRN Temp greater or equal to 38C (100.4F), Moderate Pain (4 - 6)  ondansetron Injectable 4 milliGRAM(s) IV Push three times a day PRN Nausea and/or Vomiting  sodium chloride 0.9% lock flush 10 milliLiter(s) IV Push every 1 hour PRN Pre/post blood products, medications, blood draw, and to maintain line patency    Pertinent Labs: 10-25 Na142 mmol/L Glu 118 mg/dL<H> K+ 4.0 mmol/L Cr  1.22 mg/dL BUN 11 mg/dL 10-22 Phos 3.5 mg/dL 10-25 Alb 2.6 g/dL<L>     CAPILLARY BLOOD GLUCOSE      POCT Blood Glucose.: 172 mg/dL (25 Oct 2021 16:10)  POCT Blood Glucose.: 148 mg/dL (25 Oct 2021 11:24)  POCT Blood Glucose.: 123 mg/dL (25 Oct 2021 07:49)  POCT Blood Glucose.: 136 mg/dL (24 Oct 2021 21:14)  POCT Blood Glucose.: 164 mg/dL (24 Oct 2021 16:51)    Skin: ongoing wound care     Estimated Needs:   [ X] no change since previous assessment  [ ] recalculated:     Previous Nutrition Diagnosis:   [ ] Inadequate Energy Intake [ X]Inadequate Oral Intake [ ] Excessive Energy Intake   [ ] Underweight [ ] Increased Nutrient Needs [ ] Overweight/Obesity   [ ] Altered GI Function [ ] Unintended Weight Loss [ ] Food & Nutrition Related Knowledge Deficit [ ] Malnutrition     Nutrition Diagnosis is [X ] ongoing  [ ] resolved [ ] not applicable     New Nutrition Diagnosis: [ ] not applicable     Interventions: To meet nutrition needs   Recommend  [ ] Change Diet To:  [ X] Nutrition Supplement: Add MVI/minerals daily as medically feasible   [ ] Nutrition Support  [X ] Other: Nursing to continue feeding assistance and encouragement, aspiration precaution     Monitoring and Evaluation:   [X ] PO intake [ x ] Tolerance to diet prescription [ x ] weights [ x ] labs[ x ] follow up per protocol  [ ] other: Reassessment: Request to see pt. concerning "food choices for pureed diet" & decrease po intake - 10/25/21    Patient is a 76y old  Male who presents with a chief complaint of R Foot Pain (25 Oct 2021 16://33)      Factors impacting intake: [ ] none [ ] nausea  [ ] vomiting [ ] diarrhea [ ] constipation  [ X]chewing problems [ ] swallowing issues  [ X] other: diabetes, CAD, soft tissue infection of foot, new onset Afib, glaucoma, pleural effusion     Diet Prescription: Diet, Dysphagia 1 Pureed-Thin Liquids:   Consistent Carbohydrate {Evening Snacks}  DASH/ TLC {Sodium & Cholesterol Restricted} (10-14-21 @ 21:08)    Intake: Patient on airborne precaution, visited alert but [place] & at times agitated, reports of "disliked" pureed meals, has no dentures as per discussion w/RN, "ate vert little of pureed entree" pt. drink 1/2 container of Nepro, 8oz at lunch meal today. Per pt. "wants to eat chicken", pt. amenable to Mechanical Soft diet, obtained food preferences - likes soup, mashed potatoes & pasta entree, also reviewed renal labs & within normal levels, updated kitchen/Diet Tech. D/w RN/NP, rec. changing diet to Mechanical Soft w/consistent carbohydrate (evening snack) DASH/TLC diet & Add Glucerna Shake 1can bid as medically feasible (440kcal, 20g protein), Nursing to continue feeding assistance and encouragement, aspiration precaution. Per flow sheet intake 50% on 10/25/21 noted. Pt. s/p R 5th wound debridement, graft application, bone biopsy and wound vac. application on 9/22, Podiatry following. Speech/Swallow evaluation on 10/14 noted. RD available.     Daily weight: 171.5 Lbs on 10/18/21  % Weight Change: Nursing to monitor daily wt.    Pertinent Medications: MEDICATIONS  (STANDING):  apixaban 5 milliGRAM(s) Oral every 12 hours  aspirin  chewable 81 milliGRAM(s) Oral daily  atorvastatin 80 milliGRAM(s) Oral at bedtime  collagenase Ointment 1 Application(s) Topical daily  cyanocobalamin 1000 MICROGram(s) Oral daily  dextrose 5%. 1000 milliLiter(s) (100 mL/Hr) IV Continuous <Continuous>  ergocalciferol 00332 Unit(s) Oral <User Schedule>  ferrous    sulfate Liquid 300 milliGRAM(s) Enteral Tube daily  finasteride 5 milliGRAM(s) Oral daily  gabapentin 100 milliGRAM(s) Oral three times a day  glucagon  Injectable 1 milliGRAM(s) IntraMuscular once  insulin lispro (ADMELOG) corrective regimen sliding scale   SubCutaneous Before meals and at bedtime  metoprolol tartrate 25 milliGRAM(s) Oral two times a day  NIFEdipine XL 60 milliGRAM(s) Oral daily  pantoprazole  Injectable 40 milliGRAM(s) IV Push at bedtime  piperacillin/tazobactam IVPB.. 3.375 Gram(s) IV Intermittent every 8 hours    MEDICATIONS  (PRN):  acetaminophen   Tablet .. 650 milliGRAM(s) Oral every 6 hours PRN Temp greater or equal to 38C (100.4F), Moderate Pain (4 - 6)  ondansetron Injectable 4 milliGRAM(s) IV Push three times a day PRN Nausea and/or Vomiting  sodium chloride 0.9% lock flush 10 milliLiter(s) IV Push every 1 hour PRN Pre/post blood products, medications, blood draw, and to maintain line patency    Pertinent Labs: 10-25 Na142 mmol/L Glu 118 mg/dL<H> K+ 4.0 mmol/L Cr  1.22 mg/dL BUN 11 mg/dL 10-22 Phos 3.5 mg/dL 10-25 Alb 2.6 g/dL<L>     CAPILLARY BLOOD GLUCOSE      POCT Blood Glucose.: 172 mg/dL (25 Oct 2021 16:10)  POCT Blood Glucose.: 148 mg/dL (25 Oct 2021 11:24)  POCT Blood Glucose.: 123 mg/dL (25 Oct 2021 07:49)  POCT Blood Glucose.: 136 mg/dL (24 Oct 2021 21:14)  POCT Blood Glucose.: 164 mg/dL (24 Oct 2021 16:51)    Skin: ongoing wound care     Estimated Needs:   [ X] no change since previous assessment  [ ] recalculated:     Previous Nutrition Diagnosis:   [ ] Inadequate Energy Intake [ X]Inadequate Oral Intake [ ] Excessive Energy Intake   [ ] Underweight [ ] Increased Nutrient Needs [ ] Overweight/Obesity   [ ] Altered GI Function [ ] Unintended Weight Loss [ ] Food & Nutrition Related Knowledge Deficit [ ] Malnutrition     Nutrition Diagnosis is [X ] ongoing  [ ] resolved [ ] not applicable     New Nutrition Diagnosis: [ ] not applicable     Interventions: To meet nutrition needs   Recommend  [ ] Change Diet To:  [ X] Nutrition Supplement: Add MVI/minerals daily as medically feasible   [ ] Nutrition Support  [X ] Other: Nursing to continue feeding assistance and encouragement, aspiration precaution     Monitoring and Evaluation:   [X ] PO intake [ x ] Tolerance to diet prescription [ x ] weights [ x ] labs[ x ] follow up per protocol  [ ] other: Reassessment: Request to see pt. concerning "food choices for pureed diet" & decrease po intake - 10/25/21    Patient is a 76y old  Male who presents with a chief complaint of R Foot Pain (25 Oct 2021 16://33)      Factors impacting intake: [ ] none [ ] nausea  [ ] vomiting [ ] diarrhea [ ] constipation  [ X]chewing problems [ ] swallowing issues  [ X] other: diabetes, CAD, soft tissue infection of foot, new onset Afib, glaucoma, pleural effusion     Diet Prescription: Diet, Dysphagia 1 Pureed-Thin Liquids:   Consistent Carbohydrate {Evening Snacks}  DASH/ TLC {Sodium & Cholesterol Restricted} (10-14-21 @ 21:08)    Intake: Patient on airborne precaution, visited alert but [place] & at times agitated, reports of "disliked" pureed meals, has no dentures as per discussion w/RN, "ate very little of pureed entree" pt. drink 1/2 container of Nepro, 8oz at lunch meal today. Per pt. "wants to eat chicken", pt. amenable to Mechanical Soft diet, obtained food preferences - likes soup, mashed potatoes & pasta entree, also reviewed renal labs & within normal levels, updated kitchen/Diet Tech. D/w RN/NP, rec. changing diet to Mechanical Soft w/consistent carbohydrate (evening snack) DASH/TLC diet & Add Glucerna Shake 1can bid as medically feasible (440kcal, 20g protein), Nursing to continue feeding assistance and encouragement, aspiration precaution. Per flow sheet intake 50% on 10/25/21 noted. Pt. s/p R 5th wound debridement, graft application, bone biopsy and wound vac. application on 9/22, Podiatry following. Speech/Swallow evaluation on 10/14 noted. RD available.     Daily weight: 171.5 Lbs on 10/18/21  % Weight Change: Nursing to monitor daily wt.    Pertinent Medications: MEDICATIONS  (STANDING):  apixaban 5 milliGRAM(s) Oral every 12 hours  aspirin  chewable 81 milliGRAM(s) Oral daily  atorvastatin 80 milliGRAM(s) Oral at bedtime  collagenase Ointment 1 Application(s) Topical daily  cyanocobalamin 1000 MICROGram(s) Oral daily  dextrose 5%. 1000 milliLiter(s) (100 mL/Hr) IV Continuous <Continuous>  ergocalciferol 59700 Unit(s) Oral <User Schedule>  ferrous    sulfate Liquid 300 milliGRAM(s) Enteral Tube daily  finasteride 5 milliGRAM(s) Oral daily  gabapentin 100 milliGRAM(s) Oral three times a day  glucagon  Injectable 1 milliGRAM(s) IntraMuscular once  insulin lispro (ADMELOG) corrective regimen sliding scale   SubCutaneous Before meals and at bedtime  metoprolol tartrate 25 milliGRAM(s) Oral two times a day  NIFEdipine XL 60 milliGRAM(s) Oral daily  pantoprazole  Injectable 40 milliGRAM(s) IV Push at bedtime  piperacillin/tazobactam IVPB.. 3.375 Gram(s) IV Intermittent every 8 hours    MEDICATIONS  (PRN):  acetaminophen   Tablet .. 650 milliGRAM(s) Oral every 6 hours PRN Temp greater or equal to 38C (100.4F), Moderate Pain (4 - 6)  ondansetron Injectable 4 milliGRAM(s) IV Push three times a day PRN Nausea and/or Vomiting  sodium chloride 0.9% lock flush 10 milliLiter(s) IV Push every 1 hour PRN Pre/post blood products, medications, blood draw, and to maintain line patency    Pertinent Labs: 10-25 Na142 mmol/L Glu 118 mg/dL<H> K+ 4.0 mmol/L Cr  1.22 mg/dL BUN 11 mg/dL 10-22 Phos 3.5 mg/dL 10-25 Alb 2.6 g/dL<L>     CAPILLARY BLOOD GLUCOSE      POCT Blood Glucose.: 172 mg/dL (25 Oct 2021 16:10)  POCT Blood Glucose.: 148 mg/dL (25 Oct 2021 11:24)  POCT Blood Glucose.: 123 mg/dL (25 Oct 2021 07:49)  POCT Blood Glucose.: 136 mg/dL (24 Oct 2021 21:14)  POCT Blood Glucose.: 164 mg/dL (24 Oct 2021 16:51)    Skin: ongoing wound care     Estimated Needs:   [ X] no change since previous assessment  [ ] recalculated:     Previous Nutrition Diagnosis:   [ ] Inadequate Energy Intake [ X]Inadequate Oral Intake [ ] Excessive Energy Intake   [ ] Underweight [ ] Increased Nutrient Needs [ ] Overweight/Obesity   [ ] Altered GI Function [ ] Unintended Weight Loss [ ] Food & Nutrition Related Knowledge Deficit [ ] Malnutrition     Nutrition Diagnosis is [X ] ongoing  [ ] resolved [ ] not applicable     New Nutrition Diagnosis: [ ] not applicable     Interventions: To meet nutrition needs   Recommend  [ ] Change Diet To:  [ X] Nutrition Supplement: Add MVI/minerals daily as medically feasible   [ ] Nutrition Support  [X ] Other: Nursing to continue feeding assistance and encouragement, aspiration precaution     Monitoring and Evaluation:   [X ] PO intake [ x ] Tolerance to diet prescription [ x ] weights [ x ] labs[ x ] follow up per protocol  [ ] other: Reassessment: Request to see pt. concerning "food choices for pureed diet" & decrease po intake - 10/25/21    Patient is a 76y old  Male who presents with a chief complaint of R Foot Pain (25 Oct 2021 16://33)      Factors impacting intake: [ ] none [ ] nausea  [ ] vomiting [ ] diarrhea [ ] constipation  [ X]chewing problems [ ] swallowing issues  [ X] other: diabetes, CAD, soft tissue infection of foot, new onset Afib, glaucoma, pleural effusion     Diet Prescription: Diet, Dysphagia 1 Pureed-Thin Liquids:   Consistent Carbohydrate {Evening Snacks}  DASH/ TLC {Sodium & Cholesterol Restricted} (10-14-21 @ 21:08)    Intake: Patient on airborne precaution, visited alert but [place] & at times agitated, reports of "disliked" pureed meals, has no dentures as per discussion w/RN, "ate very little of pureed entree" & pt. drink 1/2 container of Nepro, 8oz at lunch meal today. Per pt. "wants to eat chicken", pt. amenable to Mechanical Soft diet, obtained food preferences - likes soup, mashed potatoes & pasta entree, also reviewed renal labs & within normal levels, updated kitchen/Diet Tech. D/w RN/NP, rec. changing diet to Mechanical Soft w/consistent carbohydrate (evening snack) DASH/TLC diet & Add Glucerna Shake 1can bid as medically feasible (440kcal, 20g protein), Nursing to continue feeding assistance and encouragement, aspiration precaution. Per flow sheet intake 50% on 10/25/21 noted. Pt. s/p R 5th wound debridement, graft application, bone biopsy and wound vac. application on 9/22, Podiatry following. Speech/Swallow evaluation on 10/14 noted. RD available.     Daily weight: 171.5 Lbs on 10/18/21  % Weight Change: Nursing to monitor daily wt.    Pertinent Medications: MEDICATIONS  (STANDING):  apixaban 5 milliGRAM(s) Oral every 12 hours  aspirin  chewable 81 milliGRAM(s) Oral daily  atorvastatin 80 milliGRAM(s) Oral at bedtime  collagenase Ointment 1 Application(s) Topical daily  cyanocobalamin 1000 MICROGram(s) Oral daily  dextrose 5%. 1000 milliLiter(s) (100 mL/Hr) IV Continuous <Continuous>  ergocalciferol 89925 Unit(s) Oral <User Schedule>  ferrous    sulfate Liquid 300 milliGRAM(s) Enteral Tube daily  finasteride 5 milliGRAM(s) Oral daily  gabapentin 100 milliGRAM(s) Oral three times a day  glucagon  Injectable 1 milliGRAM(s) IntraMuscular once  insulin lispro (ADMELOG) corrective regimen sliding scale   SubCutaneous Before meals and at bedtime  metoprolol tartrate 25 milliGRAM(s) Oral two times a day  NIFEdipine XL 60 milliGRAM(s) Oral daily  pantoprazole  Injectable 40 milliGRAM(s) IV Push at bedtime  piperacillin/tazobactam IVPB.. 3.375 Gram(s) IV Intermittent every 8 hours    MEDICATIONS  (PRN):  acetaminophen   Tablet .. 650 milliGRAM(s) Oral every 6 hours PRN Temp greater or equal to 38C (100.4F), Moderate Pain (4 - 6)  ondansetron Injectable 4 milliGRAM(s) IV Push three times a day PRN Nausea and/or Vomiting  sodium chloride 0.9% lock flush 10 milliLiter(s) IV Push every 1 hour PRN Pre/post blood products, medications, blood draw, and to maintain line patency    Pertinent Labs: 10-25 Na142 mmol/L Glu 118 mg/dL<H> K+ 4.0 mmol/L Cr  1.22 mg/dL BUN 11 mg/dL 10-22 Phos 3.5 mg/dL 10-25 Alb 2.6 g/dL<L>     CAPILLARY BLOOD GLUCOSE      POCT Blood Glucose.: 172 mg/dL (25 Oct 2021 16:10)  POCT Blood Glucose.: 148 mg/dL (25 Oct 2021 11:24)  POCT Blood Glucose.: 123 mg/dL (25 Oct 2021 07:49)  POCT Blood Glucose.: 136 mg/dL (24 Oct 2021 21:14)  POCT Blood Glucose.: 164 mg/dL (24 Oct 2021 16:51)    Skin: ongoing wound care     Estimated Needs:   [ X] no change since previous assessment  [ ] recalculated:     Previous Nutrition Diagnosis:   [ ] Inadequate Energy Intake [ X]Inadequate Oral Intake [ ] Excessive Energy Intake   [ ] Underweight [ ] Increased Nutrient Needs [ ] Overweight/Obesity   [ ] Altered GI Function [ ] Unintended Weight Loss [ ] Food & Nutrition Related Knowledge Deficit [ ] Malnutrition     Nutrition Diagnosis is [X ] ongoing  [ ] resolved [ ] not applicable     New Nutrition Diagnosis: [ ] not applicable     Interventions: To meet nutrition needs   Recommend  [ ] Change Diet To:  [ X] Nutrition Supplement: Add MVI/minerals daily as medically feasible   [ ] Nutrition Support  [X ] Other: Nursing to continue feeding assistance and encouragement, aspiration precaution     Monitoring and Evaluation:   [X ] PO intake [ x ] Tolerance to diet prescription [ x ] weights [ x ] labs[ x ] follow up per protocol  [ ] other:

## 2021-10-25 NOTE — PROGRESS NOTE ADULT - ASSESSMENT
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  As per daughter, patient was taking tylenol which did not help his pain prompting the patient to ask his daughter to take him to the hospital. ON admission, he has no fever or Leukocytosis. The Xray of Right foot shows no Osteomyelitis but MRI of Right foot shows Lateral soft tissue wound with marrow signal abnormality throughout the fifth metatarsal which is consistent of Osteomyelitis. He has seen by Podiatry and wound culture has sent, Zosyn and Vancomycin has started. The ID consult requested to assist with further evaluation and antibiotic management.     # Right Fifth metatarsal DFU with drainage and Osteomyelitis- wound cx grew Enterococcus, Aeromonas and Klebsiella - Zosyn is the ideal to cover All organisms but kidney function is worsening, hence change to Unasyn and Levaquin until kidney function is improved - The pathology shows Bone with acute osteomyelitis and necrotic periosteal tissue.   # OREN- s/p urinary retention -s/p ibrahim catheter - s/p discontinue ACEI  # RLL pneumonia- most likely Aspiration, S/p Vomiting - On Unasyn  # Large bowel distension  # Candiduria- 9/22/21  # Pulmonary edema with bilateral moderate to large pleural effusions- on CT chest, 10/5/21- s/p intubation 10/7- s/p Right sided chest tube placement by CT team, 10/8/21  # COVID Exposure - PCR negative as of  10/11/21, 10/21/21    would recommend:    1. Please  Monitor kidney function closely while on Zosyn   2. Aspiration precaution    3.. Wound care as per Podiatry  4. Continue Zosyn until 11/3/21  5. OOB to chair /PT    Attending Attestation:    Spent more than 35 minutes on total encounter, more than 50 % of the visit was spent counseling and/or coordinating care by the Attending physician.

## 2021-10-25 NOTE — PROGRESS NOTE ADULT - SUBJECTIVE AND OBJECTIVE BOX
Patient is seen and examined at the bed side, is afebrile.  The creatinine is trending back up.      REVIEW OF SYSTEMS: All other review systems are negative      ALLERGIES: No Known Allergies      Vital Signs Last 24 Hrs  T(C): 36.8 (25 Oct 2021 05:09), Max: 36.8 (25 Oct 2021 05:09)  T(F): 98.2 (25 Oct 2021 05:09), Max: 98.2 (25 Oct 2021 05:09)  HR: 78 (25 Oct 2021 05:09) (77 - 78)  BP: 152/76 (25 Oct 2021 05:09) (142/60 - 152/76)  BP(mean): --  RR: 18 (25 Oct 2021 05:09) (18 - 18)  SpO2: 100% (25 Oct 2021 05:09) (100% - 100%)      PHYSICAL EXAM:  GENERAL: Not in distress  CHEST/LUNG:  Not using accessory muscles, s/p removal of Right CT   HEART: s1 and s2 present  ABDOMEN:  Mild distended   EXTREMITIES: Right foot bandage in placed   CNS: Awake and alert       LABS:                         9.7    4.30  )-----------( 300      ( 25 Oct 2021 08:37 )             29.8                           10.4   4.26  )-----------( 310      ( 23 Oct 2021 06:22 )             30.8       10-25    142  |  107  |  11  ----------------------------<  118<H>  4.0   |  27  |  1.22    Ca    9.4      25 Oct 2021 08:37    TPro  7.1  /  Alb  2.6<L>  /  TBili  0.7  /  DBili  x   /  AST  27  /  ALT  34  /  AlkPhos  125<H>  10-25    10-22    140  |  105  |  12  ----------------------------<  101<H>  4.3   |  27  |  1.07    Ca    9.7      22 Oct 2021 07:07  Phos  3.5     10-22  Mg     2.3     10-22    TPro  6.8  /  Alb  2.4<L>  /  TBili  0.7  /  DBili  x   /  AST  31  /  ALT  50  /  AlkPhos  130<H>  10-21      09-25    139  |  107  |  41<H>  ----------------------------<  134<H>  4.1   |  21<L>  |  4.05<H>    Ca    8.6      25 Sep 2021 06:40    TPro  6.6  /  Alb  2.3<L>  /  TBili  0.5  /  DBili  x   /  AST  25  /  ALT  15  /  AlkPhos  102  09-25        CAPILLARY BLOOD GLUCOSE  POCT Blood Glucose.: 134 mg/dL (17 Sep 2021 17:11)  POCT Blood Glucose.: 128 mg/dL (17 Sep 2021 11:06)  POCT Blood Glucose.: 157 mg/dL (17 Sep 2021 04:10)      Vancomycin Level, Trough (10.14.21 @ 11:32) : 21.8  Vancomycin Level, Trough (10.12.21 @ 12:13)   Vancomycin Level, Trough: 19.5:      MEDICATIONS  (STANDING):    apixaban 5 milliGRAM(s) Oral every 12 hours  aspirin  chewable 81 milliGRAM(s) Oral daily  atorvastatin 80 milliGRAM(s) Oral at bedtime  collagenase Ointment 1 Application(s) Topical daily  cyanocobalamin 1000 MICROGram(s) Oral daily  dextrose 5%. 1000 milliLiter(s) (100 mL/Hr) IV Continuous <Continuous>  ergocalciferol 14829 Unit(s) Oral <User Schedule>  ferrous    sulfate Liquid 300 milliGRAM(s) Enteral Tube daily  finasteride 5 milliGRAM(s) Oral daily  gabapentin 100 milliGRAM(s) Oral three times a day  glucagon  Injectable 1 milliGRAM(s) IntraMuscular once  insulin lispro (ADMELOG) corrective regimen sliding scale   SubCutaneous Before meals and at bedtime  metoprolol tartrate 25 milliGRAM(s) Oral two times a day  NIFEdipine XL 60 milliGRAM(s) Oral daily  pantoprazole  Injectable 40 milliGRAM(s) IV Push at bedtime  piperacillin/tazobactam IVPB.. 3.375 Gram(s) IV Intermittent every 8 hours          RADIOLOGY & ADDITIONAL TESTS:    10/5/21 CT Chest No Cont (10.05.21 @ 14:07) Pulmonary edema with bilateral moderate to large pleural effusions. Underlying bilateral lower lobe compressive atelectasis versus pneumonia.  Mildly enlarged mediastinal lymph nodes, possibly reactive.      10/2/21: Xray Chest 1 View- PORTABLE-Routine (Xray Chest 1 View- PORTABLE-Routine in AM.) (10.02.21 @ 09:46) There is persistent bilateral perihilar/basilar diffuse airspace disease and/or RIGHT effusion.  Cardiomegaly.  No interval change.    Collected Date/Time: 9/22/2021 08:42 EDT   Received Date/Time: 9/23/2021 09:29 EDT   Surgical Pathology Report - Auth (Verified)   Specimen(s) Submitted   1 Right 5th Toe Wound Debridement r/o Osteomyelitis   Final Diagnosis   Right fifth toe; wound debridement:   - Bone with acute osteomyelitis and necrotic periosteal tissue.     9/28/21: US Abdomen Upper Quadrant Right (09.28.21 @ 12:13) Cholelithiasis without evidence of acute cholecystitis. Note is made of right pleural effusion    9/27/21: CT Abdomen and Pelvis w/ Oral Cont (09.27.21 @ 15:53) >No acute intra-abdominal pathology.    Small bilateral pleural effusions with compressive atelectasis of both lower lobes.    9/26/21: Xray Chest 1 View-PORTABLE IMMEDIATE (Xray Chest 1 View-PORTABLE IMMEDIATE .) (09.26.21 @ 15:28) : Small bilateral pleural effusions and mild pulmonary edema. A right lower lobe pneumonia is not excluded.      9/26/21: Xray Abdomen 1 View Portable, IMMEDIATE (Xray Abdomen 1 View Portable, IMMEDIATE .) (09.26.21 @ 15:28) : There is a nasogastric tube with its tip in the stomach. There is gaseous distention of the colon. No pathologic calcifications are seen. The osseous structures are intact with degenerative change is present in the spine.      9/17/21 : MR Foot No Cont, Right (09.17.21 @ 13:04) : Resection at the mid aspect of the fifth metatarsal. Lateral soft tissue wound with marrow signal abnormality throughout the fifth metatarsal and within the adjacent fourth metatarsal head which is nonspecific in the setting of recent surgery although osteomyelitis is suspected.    9/16/21 : Xray Foot AP + Lateral + Oblique, Right (09.16.21 @ 15:03) : No acute finding. No plain film evidence of osteomyelitis.        MICROBIOLOGY DATA:    COVID-19 PCR (10.11.21 @ 05:44)   COVID-19 PCR: NotDetec:   Urine Microscopic-Add On (NC) (09.22.21 @ 16:14)   Red Blood Cell - Urine: 0-2 /HPF   White Blood Cell - Urine: 3-5 /HPF   Bacteria: Moderate /HPF   Comment - Urine: yeast cells present   Epithelial Cells: Moderate /HPF     Culture - Tissue with Gram Stain (09.22.21 @ 13:54)   Gram Stain: No polymorphonuclear cells seen per low power field   No organisms seen per oil power field   Specimen Source: .Tissue Right 5th toe r/o osteomyeltis   Culture Results: No growth     COVID-19 David Domain Antibody (09.18.21 @ 12:55)   COVID-19 David Domain Antibody Result: >250.00:    Culture - Blood (09.17.21 @ 10:28)   Specimen Source: .Blood Blood-Peripheral   Culture Results: No growth to date.     Culture - Blood (09.17.21 @ 10:28)   Specimen Source: .Blood Blood-Venous   Culture Results: No growth to date.     Culture - Surgical Swab (09.16.21 @ 21:42)   - Amikacin: S <=16   - Amikacin: S <=16   - Amoxicillin/Clavulanic Acid: S <=8/4   - Ampicillin: R >16 These ampicillin results predict results for amoxicillin   - Ampicillin: S <=2 Predicts results to ampicillin/sulbactam, amoxacillin-clavulanate and piperacillin-tazobactam.   - Ampicillin/Sulbactam: S 8/4 Enterobacter, Citrobacter, and Serratia may develop resistance during prolonged therapy (3-4 days)   - Ampicillin/Sulbactam: R >16/8   - Aztreonam: S <=4   - Aztreonam: S <=4   - Cefazolin: R 16 Enterobacter, Citrobacter, and Serratia may develop resistance during prolonged therapy (3-4 days)   - Cefazolin: R >16   - Cefepime: S <=2   - Cefepime: S <=2   - Cefoxitin: S <=8   - Cefoxitin: S <=8   - Ceftazidime: S <=1   - Ceftriaxone: S <=1   - Ceftriaxone: S <=1 Enterobacter, Citrobacter, and Serratia may develop resistance during prolonged therapy   - Ciprofloxacin: S <=0.25   - Ciprofloxacin: S <=0.25   - Ertapenem: S <=0.5   - Gentamicin: S <=2   - Gentamicin: S <=2   - Imipenem: S <=1   - Levofloxacin: S <=0.5   - Levofloxacin: S <=0.5   - Meropenem: S <=1   - Meropenem: S <=1   - Piperacillin/Tazobactam: S <=8   - Piperacillin/Tazobactam: S <=8   - Tetra/Doxy: S 4   - Tobramycin: S <=2   - Trimethoprim/Sulfamethoxazole: S <=0.5/9.5   - Trimethoprim/Sulfamethoxazole: S <=0.5/9.5   - Vancomycin: S 2   Specimen Source: .Surgical Swab right foot wound   Culture Results:   Culture yields >4 types of aerobic and/or anaerobic bacteria   Call client services within 7 days if further workup is clinically   indicated. Culture includes   Rare Aeromonas hydrophila/caviae   Few Klebsiella oxytoca/Raoutella ornithinolytica   Few Enterococcus faecalis   Few Streptococcus agalactiae (Group B) isolated   Group B streptococci are susceptible to ampicillin,   penicillin and cefazolin, but may be resistant to   erythromycin and clindamycin.   Recommendations for intrapartum prophylaxis for Group B   streptococci are penicillin or ampicillin.   Organism Identification: Aeromonas hydrophila/caviae   Klebsiella oxytoca /Raoutella ornithinolytica   Enterococcus faecalis   Organism: Aeromonas hydrophila/caviae   Organism: Klebsiella oxytoca /Raoutella ornithinolytica   Organism: Enterococcus faecalis   COVID-19 PCR . (09.16.21 @ 22:24)   COVID-19 PCR: NotDetec:

## 2021-10-25 NOTE — PROGRESS NOTE ADULT - SUBJECTIVE AND OBJECTIVE BOX
Patient is a 76y old  Male who presents with a chief complaint of R Foot Pain (25 Oct 2021 16:33)    PATIENT IS SEEN AND EXAMINED IN MEDICAL FLOOR.  NGT [    ]    MADDY [   ]      GT [   ]    ALLERGIES:  No Known Allergies      Daily     Daily     VITALS:    Vital Signs Last 24 Hrs  T(C): 36.6 (25 Oct 2021 19:29), Max: 36.8 (25 Oct 2021 05:09)  T(F): 97.8 (25 Oct 2021 19:29), Max: 98.2 (25 Oct 2021 05:09)  HR: 65 (25 Oct 2021 19:29) (65 - 78)  BP: 132/69 (25 Oct 2021 19:29) (132/69 - 152/76)  BP(mean): --  RR: 18 (25 Oct 2021 19:29) (18 - 18)  SpO2: 100% (25 Oct 2021 19:29) (100% - 100%)    LABS:    CBC Full  -  ( 25 Oct 2021 08:37 )  WBC Count : 4.30 K/uL  RBC Count : 3.28 M/uL  Hemoglobin : 9.7 g/dL  Hematocrit : 29.8 %  Platelet Count - Automated : 300 K/uL  Mean Cell Volume : 90.9 fl  Mean Cell Hemoglobin : 29.6 pg  Mean Cell Hemoglobin Concentration : 32.6 gm/dL  Auto Neutrophil # : x  Auto Lymphocyte # : x  Auto Monocyte # : x  Auto Eosinophil # : x  Auto Basophil # : x  Auto Neutrophil % : x  Auto Lymphocyte % : x  Auto Monocyte % : x  Auto Eosinophil % : x  Auto Basophil % : x      10-25    142  |  107  |  11  ----------------------------<  118<H>  4.0   |  27  |  1.22    Ca    9.4      25 Oct 2021 08:37    TPro  7.1  /  Alb  2.6<L>  /  TBili  0.7  /  DBili  x   /  AST  27  /  ALT  34  /  AlkPhos  125<H>  10-25    CAPILLARY BLOOD GLUCOSE      POCT Blood Glucose.: 172 mg/dL (25 Oct 2021 16:10)  POCT Blood Glucose.: 148 mg/dL (25 Oct 2021 11:24)  POCT Blood Glucose.: 123 mg/dL (25 Oct 2021 07:49)        LIVER FUNCTIONS - ( 25 Oct 2021 08:37 )  Alb: 2.6 g/dL / Pro: 7.1 g/dL / ALK PHOS: 125 U/L / ALT: 34 U/L DA / AST: 27 U/L / GGT: x           Creatinine Trend: 1.22<--, 1.07<--, 0.84<--, 1.00<--, 0.97<--, 0.97<--  I&O's Summary    24 Oct 2021 07:01  -  25 Oct 2021 07:00  --------------------------------------------------------  IN: 0 mL / OUT: 1000 mL / NET: -1000 mL            .Body Fluid Pleural Fluid  10-08 @ 18:51   No growth at 1 week.  --  --      .Body Fluid Pleural Fluid  10-08 @ 18:34   No growth at 5 days  --    polymorphonuclear leukocytes seen  No organisms seen  by cytocentrifuge      .Tissue Right 5th toe r/o osteomyeltis  09-22 @ 13:54   No growth at 5 days  --    No polymorphonuclear cells seen per low power field  No organisms seen per oil power field      .Blood Blood-Venous  09-17 @ 10:28   No Growth Final  --  --      .Surgical Swab right foot wound  09-16 @ 21:42   Culture yields >4 types of aerobic and/or anaerobic bacteria  Call client services within 7 days if further workup is clinically  indicated. Culture includes  Rare Aeromonas hydrophila/caviae  Few Klebsiella oxytoca/Raoutella ornithinolytica  Few Enterococcus faecalis  Few Streptococcus agalactiae (Group B) isolated  Group B streptococci are susceptible to ampicillin,  penicillin and cefazolin, but may be resistant to  erythromycin and clindamycin.  Recommendations for intrapartum prophylaxis for Group B  streptococci are penicillin or ampicillin.  --  Aeromonas hydrophila/caviae  Klebsiella oxytoca /Raoutella ornithinolytica  Enterococcus faecalis      Bone R 5th metatarsal bone clean ma  08-20 @ 03:59   No growth at 5 days  --    No polymorphonuclear leukocytes seen per low power field  No organisms seen per oil power field      .Blood Blood-Venous  08-14 @ 21:09   No Growth Final  --  --      .Surgical Swab Right foot plantar wound  08-14 @ 21:08   Moderate Streptococcus agalactiae (Group B) isolated  Group B streptococci are susceptible to ampicillin,  penicillin and cefazolin, but may be resistant to  erythromycin and clindamycin.  Recommendations for intrapartum prophylaxis for Group B  streptococci are penicillin or ampicillin.  Moderate Bacteroides fragilis "Susceptibilities not performed"  Normal skin filiberto isolated  --  --          MEDICATIONS:    MEDICATIONS  (STANDING):  apixaban 5 milliGRAM(s) Oral every 12 hours  aspirin  chewable 81 milliGRAM(s) Oral daily  atorvastatin 80 milliGRAM(s) Oral at bedtime  collagenase Ointment 1 Application(s) Topical daily  cyanocobalamin 1000 MICROGram(s) Oral daily  dextrose 5% + sodium chloride 0.45%. 1000 milliLiter(s) (75 mL/Hr) IV Continuous <Continuous>  dextrose 5%. 1000 milliLiter(s) (100 mL/Hr) IV Continuous <Continuous>  ergocalciferol 60704 Unit(s) Oral <User Schedule>  ferrous    sulfate Liquid 300 milliGRAM(s) Enteral Tube daily  finasteride 5 milliGRAM(s) Oral daily  gabapentin 100 milliGRAM(s) Oral three times a day  glucagon  Injectable 1 milliGRAM(s) IntraMuscular once  insulin lispro (ADMELOG) corrective regimen sliding scale   SubCutaneous Before meals and at bedtime  metoprolol tartrate 25 milliGRAM(s) Oral two times a day  NIFEdipine XL 60 milliGRAM(s) Oral daily  pantoprazole  Injectable 40 milliGRAM(s) IV Push at bedtime  piperacillin/tazobactam IVPB.. 3.375 Gram(s) IV Intermittent every 8 hours      MEDICATIONS  (PRN):  acetaminophen   Tablet .. 650 milliGRAM(s) Oral every 6 hours PRN Temp greater or equal to 38C (100.4F), Moderate Pain (4 - 6)  ondansetron Injectable 4 milliGRAM(s) IV Push three times a day PRN Nausea and/or Vomiting  sodium chloride 0.9% lock flush 10 milliLiter(s) IV Push every 1 hour PRN Pre/post blood products, medications, blood draw, and to maintain line patency      REVIEW OF SYSTEMS:                           ALL ROS DONE [ X   ]    CONSTITUTIONAL:  LETHARGIC [   ], FEVER [   ], UNRESPONSIVE [   ]  CVS:  CP  [   ], SOB, [   ], PALPITATIONS [   ], DIZZYNESS [   ]  RS: COUGH [   ], SPUTUM [   ]  GI: ABDOMINAL PAIN [   ], NAUSEA [   ], VOMITINGS [   ], DIARRHEA [   ], CONSTIPATION [   ]  :  DYSURIA [   ], NOCTURIA [   ], INCREASED FREQUENCY [   ], DRIBLING [   ],  SKELETAL: PAINFUL JOINTS [   ], SWOLLEN JOINTS [   ], NECK ACHE [   ], LOW BACK ACHE [   ],  SKIN : ULCERS [   ], RASH [   ], ITCHING [   ]  CNS: HEAD ACHE [   ], DOUBLE VISION [   ], BLURRED VISION [   ], AMS / CONFUSION [   ], SEIZURES [   ], WEAKNESS [   ],TINGLING / NUMBNESS [   ]    PHYSICAL EXAMINATION:    GENERAL APPEARANCE: NO DISTRESS  HEENT:  NO PALLOR, NO  JVD,  NO   NODES, NECK SUPPLE  CVS: S1 +, S2 +,   RS: AEEB,  OCCASIONAL  RALES +,  RONCHI +  ABD: SOFT, NT, NO, BS +       EXT: NO PE  SKIN: WARM,   RIGHT FOOT WRAPPED  SKELETAL:  ROM ACCEPTABLE  CNS:  AAO X  2-3        RADIOLOGY :    < from: Transthoracic Echocardiogram (10.07.21 @ 15:26) >  CONCLUSIONS:  1. Normal mitral valve. Moderate mitral regurgitation.  2. Calcified aortic valve with decreased opening, cannot  exclude bicuspid. Peak transaortic valve gradient equals  32.4 mm Hg, mean transaortic valve gradient equals 19 mm  Hg, dimensionless index 0.22, estimated aortic valve area  equals 0.6sqcm (by continuity equation), consistent with  paradoxical low-flow low-gradient severe aortic stenosis.  Consider dobutamine stress echocardiogram and/or SABIHA for  further evaluation.  No aortic valve regurgitation seen.  3. Normal aortic root.  4. Normal left atrium.  5. Moderate concentric left ventricular hypertrophy.  6. Moderate global left ventricular systolic dysfunction  (EF 40-45% by visual estimation).  7. Grade II diastolic dysfunction (moderate).  8. Normal right atrium.  9. Normal right ventricular size with decreased RV systolic  function (TAPSE 1.2 cm).  10. RV systolic pressure is borderline normal at  34 mm Hg.  11. Tricuspid valve not well seen. Trace tricuspid  regurgitation.  12. Normal pulmonic valve. Trace pulmonic insufficiency is  noted.  13. No pericardial effusion.  14. Bilateral pleural effusions.    < end of copied text >      EXAM:  CT ABDOMEN AND PELVIS OC                            PROCEDURE DATE:  09/27/2021          INTERPRETATION:  CLINICAL INFORMATION: Small bowel obstruction.    COMPARISON: None.    CONTRAST/COMPLICATIONS:  IV Contrast: NONE  Oral Contrast: Omnipaque 300  Complications: None reported at time of study completion    PROCEDURE:  CT of the Abdomen and Pelvis was performed.  Sagittal and coronal reformats were performed.    FINDINGS:  LOWER CHEST: Small bilateral pleural effusions with compressiveatelectasis of both lower lobes.    LIVER: Within normal limits.  BILE DUCTS: Normal caliber.  GALLBLADDER: Distended. Small layering gallstones.  SPLEEN: Within normal limits.  PANCREAS: Within normal limits.  ADRENALS: Within normal limits.  KIDNEYS/URETERS: No hydronephrosis. Nonobstructing left upper pole calculus, measuring 0.6 cm.    BLADDER: Urinary bladder contains air and a Castañeda catheter balloon.  REPRODUCTIVE ORGANS: Prostate is not enlarged.    BOWEL: No bowel obstruction. Appendix isnormal. Colonic diverticulosis.  PERITONEUM: Mild presacral fluid.  VESSELS: Atherosclerotic changes.  RETROPERITONEUM/LYMPH NODES: No lymphadenopathy.  ABDOMINAL WALL: Within normal limits.  BONES: Degenerative changes. Sternotomy.    IMPRESSION:  No acute intra-abdominal pathology.    Small bilateral pleural effusions with compressive atelectasis of both lower lobes.    ====================================================    EXAM:  MR FOOT RT                            PROCEDURE DATE:  09/17/2021          INTERPRETATION:  Clinical Information: Recent fifth metatarsal head resection now with fifth digit pain, swelling and foul discharge.    Comparison: Radiographs of the right foot from 9/16/2021 and MRI the right foot from 8/16/2021.    Technique:  MRI of the right midfoot and forefoot.  Intravenous Contrast: None.    Findings:    There is a resection at the mid aspect of the fifth metatarsal shaft. There is a lateral soft tissue wound beginning at the level of the amputation which extends distally. There is susceptibility artifact in the region of the amputation consistent with postoperative change. There is hyperintense T2 marrow signal throughout the remaining fifth metatarsal and within the adjacent fourth metatarsal head which is nonspecific and could be related to recent postoperative changes although osteomyelitis is suspected.    There is edema and mild atrophy within the plantar muscles of the foot. There is minimal spurring at the first metatarsophalangeal and hallux sesamoid articulations.    Impression:  Resection at the mid aspect of the fifth metatarsal. Lateral soft tissue wound with marrow signal abnormality throughout the fifth metatarsal and within the adjacent fourth metatarsal head which is nonspecific in the setting of recent surgery although osteomyelitis is suspected.        ASSESSMENT :     Headache    HTN (hypertension)    HLD (hyperlipidemia)    DM (diabetes mellitus)    CAD (coronary artery disease)    Glaucoma, angle-closure    [place] (hard of hearing)      S/P CABG (coronary artery bypass graft)    History of thyroid surgery        PLAN:    HPI:  76M from home, lives with daughter w/ PMH DM on insulin, HTN, HLD, CAD, 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 Pt with sharp pain on the R lateral foot. As per daughter, pt was taking tylenol which did not help his pain prompting the patient to ask his daughter to take him to the hospital. Pt is a poor historian. Daughter states that the patient did not see his podiatrist after the surgery. No fever, chest, pain, palpitations, nausea, vomiting, diarrhea (17 Sep 2021 01:11)    # CASE D/W PATIENT CARE TEAM CHERELLE DIAL TO HELP ASSIST WITH PATIENT AND FAMILY'S NEEDS AT THIS TIME     # PATIENT IS S/P ICU MONITORING AND RIGHT CHEST TUBE REMOVAL ON 10/15/2021     # COVID EXPOSURE ON 10/13 AND ONCE MORE HAD RE-EXPOSURE ON 10/22 - INFECTION CONTROL IS AWARE AND ADDRESSING - ON CONTACT AND DROPLET ISOLATION PRECAUTION; F/U COVID PCR    # SUSPECT RIGHT FOOT OSTEOMYELITIS S/P RIGHT 5TH RAY RESECTION [8/19] AND S/P DEBRIDEMENT, GRAFT APPLICATION, BONE BX AND WOUND VAC APPLICATION 9/22 - BONE BX W/ ACUTE OSTEOMYELITIS AND NECROTIC PERIOSTEAL TISSUE  ** RECENT ADMISSION FOR RIGHT DIABETIC FOOT ULCER, CELLULITIS, OSTEOMYELITIS RIGHT 5th METATARSAL S/P RIGHT 5TH PARTIAL RAY AMPUTATION [8/20] - BONE PATHOLOGY W/ CLEAN MARGINS    - S/P VANCOMYCIN AND ZOSYN ; NOW ON UNASYN AND LEVAQUIN GIVEN OREN; PER ID SWITCH TO ZOSYN UNTIL 11/3   - RECENTLY HAD SIMON W/ MILD PAD  - REVIEWED WOUND CX [ ENTEROCOCCUS, AEROMONAS, KLEBSIELLA] AND BCX  - ID CONSULT, PODIATRY CONSULT    - PICC LINE PLACED TO COMPLETE 6 WEEKS OF ANTIBIOTICS - ON ZOSYN UNTIL 11/3 ON D/C    # ACUTE HYPOXIC RESPIRATORY FAILURE DUE TO ACUTE ON CHRONIC SYSTOLIC CHF  (  LV EF 40- 45 % ) , DIASTOLIC LV DYSFUNCTION , ,  SEVERE AORTIC STENOSIS & MODERATE MITRAL REGURGITATION  &  ASPIRATION PNA;  - S/P CHEST TUBE INSERTION AND REMOVAL  , S/P LASIX ,   CARDIOLOGY CONSULT IN PROGRESS      - CHEST TUBE PLACED [10/8] - FLUID CONSISTENT WITH TRANSUDATIVE PROCESS  - S/P EXTUBATION 10/13  - S/P CHEST TUBE REMOVAL 10/15      # SEPTIC SHOCK - ? S/T HYPOVOLEMIA VS. SEPSIS - S/P CVC [SWITCHED FROM FEMORAL TO LEFT IJ], S/P VASOPRESSORS - RESOLVING  - BCX - NGTD  - ON MEROPENEM      # TRANSIENT NEW ONSET A.FIB, PAROXYSMAL - ON ELIQUIS, CARDIOLOGY CONSULT IN PROGRESS    # FUNGURIA - D/C FLUCONAZOLE GIVEN TRANSAMINITIS    # TRANSAMINITIS - SUSPECT THIS IS ISCHEMIC HEPATITIS - IMPROVING - HEPATOLOGY CONSULT IN PROGRESS    # BOWEL DISTENTION - ? ILEUS - OPTIMIZING ELECTROLYTES - IMPROVED  - SURGERY CONSULT IN PROGRESS    # CHOLELITHIASIS - TRENDING LFTS, RUQ U/S - CHOLELITHIASIS, S/P SURGERY CONSULT   - S/P GI CONSULT - LESS SUSPICIOUS FOR CHOLECYSTITIS    # DYSPEPSIA - PLACED ON PPI BID WITH IMPROVEMENT, UNDERWENT ST EVAL     # ELEVATED TROPONINS - NSTEMI - ? DEMAND - WILL NEED ISCHEMIC EVAL ONCE ACUTE INFECTION RESOLVES PER CARDIOLOGY, CARDIOLOGY CONSULT IN PROGRESS  - ON ASA, STATIN, BB    # OREN ON CKD - S/T ATN AND URINARY RETENTION - S/P IVF, NOW W/ CASTAÑEDA, NEPHROLOGY CONUSLT IN PROGRESS  -  BPH ON FLOMAX, AND FINASTERIDE  - FAILED TOV WITH WORSENING RENAL FXN - NOTED URINARY RETENTION [10/4] - REPLACED CASTAÑEDA  - TOV 10/18 - BLADDER SCAN BID TO EVALUATE FOR URINARY RETENTION - FAILED TOV  - OVERNIGHT 10/18 - CASTAÑEDA REPLACED    # MEDICAL CLEARANCE FOR SURGERY - RCRI - 2 - 10.1 % 30 DAY RISK OF DEATH, MI OR CARDIAC ARREST  - CARDIOLOGY CONSULT     # DM -  HBA1C - 11  - LANTUS, SSI + FS    # CAD S/P CABG - PLACED ON ASA, STATIN, BB  - ECHO - SEVERE AORTIC STENOSIS, SEVERE CONCENTRIC LVH, G1DD, MILD IA  - CARDIOLOGY CONSULT - DR. BASSETT   - MAY NEED ISCHEMIC EVAL ONCE ACUTE ILLNESS RESOLVES INCLUDING CARDIAC CATHETERIZATION    # HTN, HLD  - ON METOPROLOL, NIFEDIPINE, STATIN       #  IMPAIRED GAIT DUE TO CERVICAL & LS SPONDYLOSIS, POLY ARTHRITIS AND DIABETIC PERIPHERAL NEUROPATHY, OP VITAMIN D DEFICIENCY - ON CHOLECALCIFEROL, OBTAIN PT & OT   #  FAILURE TO THRIVE , MODERATE PROTEIN CALORIE MALNUTRITION       # CASE DISCUSSED AT LENGTH WITH PATIENT AND DAUGHTER, BIRDIE ROBERT VIA PHONE @ [phone] [10/25] - CASE DICUSSED AT LENGTH AND ALL QUESTIONS WERE ANSWERED. DAUGHTER UNDERSTOOD THAT PROGNOSIS IS GUARDED.  # PATIENT AND DAUGHTER REFUSED [facility] ON PREVIOUS ADMISSION, PREVIOUSLY WISHED FOR PATIENT RETURN HOME W/ HOME PT; HOWEVER NOW, DAUGHTER WISHES FOR PATIENT TO GO TO [facility], AS PATIENT'S WIFE IS CURRENTLY ADMITTED THERE    # GI AND DVT PPX

## 2021-10-25 NOTE — PROGRESS NOTE ADULT - ASSESSMENT
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 Of note, the patient never followed up with podiatry after his procedure in August. MRI confirms suspected osteomyelitis - patient followed by podiatry and ID, patient underwent debridement and wound VAC placement and removed in OR, was treated with Unasyn and Levofloxacin, ID. Surgical pathology resulted OM, wound culture resulting Enterococcus Aeromonal and Klebsiella- treated initially with Zosyn however hospital course complicated with OREN likely mixed etiology with prerenal from active infection and pulmonary edema, intrarenal due to toxicity from IV antibiotics and post renal from urinary retention, Nephrology consulted for optimization of kidney function. IV antibiotic regimen switched to Meropenem, sensitive for organism, will need 6 weeks of IV antibiotics via PICC, until 11/3. IR guided PICC placed to Left arm.  Patient subsequently treated for Pulmonary Edema with IV lasix, Cardiology recommending SABIHA with L/R heart cath once infection clears and medically stable. Hospital course further complicated by abdominal distention and constipation for which he received lactulose and vomited. The patient had subsequent respiratory distress and it is suspected he aspirated as CXR showed possible RLL PNA. AXR shows distended loops of bowel for which NG tube was placed which the patient removed overnight 9/26 - re-attempts to replace NG tube were unsuccessful as patient was non-cooperative. Surgery consulted. CT abdomen deferred for now as patient is unstable and will not tolerate lying flat. ICU consulted given patient's deterioration in clinical status.  Patient was transferred to ICU, mechanically intubated and extubated for AHRF secondary to acute on CHF. Right pigtail placed for pleural effusion, and removed when resolved by Thoracic surgery. Patient stable and transferred to medicine floor. Patient however exposed to COVID, and being monitored for conversion. PT recommending STEVAN.

## 2021-10-26 LAB
ANION GAP SERPL CALC-SCNC: 10 MMOL/L — SIGNIFICANT CHANGE UP (ref 5–17)
BUN SERPL-MCNC: 9 MG/DL — SIGNIFICANT CHANGE UP (ref 7–18)
CALCIUM SERPL-MCNC: 8.8 MG/DL — SIGNIFICANT CHANGE UP (ref 8.4–10.5)
CHLORIDE SERPL-SCNC: 108 MMOL/L — SIGNIFICANT CHANGE UP (ref 96–108)
CO2 SERPL-SCNC: 23 MMOL/L — SIGNIFICANT CHANGE UP (ref 22–31)
CREAT SERPL-MCNC: 1.17 MG/DL — SIGNIFICANT CHANGE UP (ref 0.5–1.3)
GLUCOSE BLDC GLUCOMTR-MCNC: 132 MG/DL — HIGH (ref 70–99)
GLUCOSE BLDC GLUCOMTR-MCNC: 136 MG/DL — HIGH (ref 70–99)
GLUCOSE BLDC GLUCOMTR-MCNC: 143 MG/DL — HIGH (ref 70–99)
GLUCOSE BLDC GLUCOMTR-MCNC: 166 MG/DL — HIGH (ref 70–99)
GLUCOSE SERPL-MCNC: 149 MG/DL — HIGH (ref 70–99)
HCT VFR BLD CALC: 29.8 % — LOW (ref 39–50)
HGB BLD-MCNC: 9.6 G/DL — LOW (ref 13–17)
MCHC RBC-ENTMCNC: 29.8 PG — SIGNIFICANT CHANGE UP (ref 27–34)
MCHC RBC-ENTMCNC: 32.2 GM/DL — SIGNIFICANT CHANGE UP (ref 32–36)
MCV RBC AUTO: 92.5 FL — SIGNIFICANT CHANGE UP (ref 80–100)
NRBC # BLD: 0 /100 WBCS — SIGNIFICANT CHANGE UP (ref 0–0)
PLATELET # BLD AUTO: 295 K/UL — SIGNIFICANT CHANGE UP (ref 150–400)
POTASSIUM SERPL-MCNC: 3.5 MMOL/L — SIGNIFICANT CHANGE UP (ref 3.5–5.3)
POTASSIUM SERPL-SCNC: 3.5 MMOL/L — SIGNIFICANT CHANGE UP (ref 3.5–5.3)
RBC # BLD: 3.22 M/UL — LOW (ref 4.2–5.8)
RBC # FLD: 14.6 % — HIGH (ref 10.3–14.5)
SODIUM SERPL-SCNC: 141 MMOL/L — SIGNIFICANT CHANGE UP (ref 135–145)
WBC # BLD: 4.87 K/UL — SIGNIFICANT CHANGE UP (ref 3.8–10.5)
WBC # FLD AUTO: 4.87 K/UL — SIGNIFICANT CHANGE UP (ref 3.8–10.5)

## 2021-10-26 RX ADMIN — Medication 1 APPLICATION(S): at 12:39

## 2021-10-26 RX ADMIN — FINASTERIDE 5 MILLIGRAM(S): 5 TABLET, FILM COATED ORAL at 12:37

## 2021-10-26 RX ADMIN — PIPERACILLIN AND TAZOBACTAM 25 GRAM(S): 4; .5 INJECTION, POWDER, LYOPHILIZED, FOR SOLUTION INTRAVENOUS at 21:51

## 2021-10-26 RX ADMIN — Medication 300 MILLIGRAM(S): at 12:38

## 2021-10-26 RX ADMIN — PANTOPRAZOLE SODIUM 40 MILLIGRAM(S): 20 TABLET, DELAYED RELEASE ORAL at 21:51

## 2021-10-26 RX ADMIN — PREGABALIN 1000 MICROGRAM(S): 225 CAPSULE ORAL at 12:38

## 2021-10-26 RX ADMIN — ATORVASTATIN CALCIUM 80 MILLIGRAM(S): 80 TABLET, FILM COATED ORAL at 21:51

## 2021-10-26 RX ADMIN — APIXABAN 5 MILLIGRAM(S): 2.5 TABLET, FILM COATED ORAL at 17:33

## 2021-10-26 RX ADMIN — Medication 25 MILLIGRAM(S): at 05:32

## 2021-10-26 RX ADMIN — Medication 1: at 21:51

## 2021-10-26 RX ADMIN — GABAPENTIN 100 MILLIGRAM(S): 400 CAPSULE ORAL at 05:30

## 2021-10-26 RX ADMIN — Medication 60 MILLIGRAM(S): at 05:32

## 2021-10-26 RX ADMIN — PIPERACILLIN AND TAZOBACTAM 25 GRAM(S): 4; .5 INJECTION, POWDER, LYOPHILIZED, FOR SOLUTION INTRAVENOUS at 13:55

## 2021-10-26 RX ADMIN — PIPERACILLIN AND TAZOBACTAM 25 GRAM(S): 4; .5 INJECTION, POWDER, LYOPHILIZED, FOR SOLUTION INTRAVENOUS at 05:33

## 2021-10-26 RX ADMIN — Medication 25 MILLIGRAM(S): at 17:33

## 2021-10-26 RX ADMIN — GABAPENTIN 100 MILLIGRAM(S): 400 CAPSULE ORAL at 21:51

## 2021-10-26 RX ADMIN — APIXABAN 5 MILLIGRAM(S): 2.5 TABLET, FILM COATED ORAL at 05:31

## 2021-10-26 RX ADMIN — Medication 81 MILLIGRAM(S): at 12:37

## 2021-10-26 RX ADMIN — GABAPENTIN 100 MILLIGRAM(S): 400 CAPSULE ORAL at 13:55

## 2021-10-26 NOTE — PROGRESS NOTE ADULT - SUBJECTIVE AND OBJECTIVE BOX
Patient is seen and examined at the bed side, is afebrile.  The creatinine is trending back up.      REVIEW OF SYSTEMS: All other review systems are negative      ALLERGIES: No Known Allergies      Vital Signs Last 24 Hrs  T(C): 36.7 (26 Oct 2021 13:47), Max: 36.7 (26 Oct 2021 13:47)  T(F): 98 (26 Oct 2021 13:47), Max: 98 (26 Oct 2021 13:47)  HR: 75 (26 Oct 2021 13:47) (65 - 83)  BP: 128/64 (26 Oct 2021 13:47) (124/60 - 132/69)  BP(mean): --  RR: 16 (26 Oct 2021 13:47) (16 - 18)  SpO2: 100% (26 Oct 2021 13:47) (95% - 100%)      PHYSICAL EXAM:  GENERAL: Not in distress  CHEST/LUNG:  Not using accessory muscles, s/p removal of Right CT   HEART: s1 and s2 present  ABDOMEN:  Mild distended   EXTREMITIES: Right foot bandage in placed   CNS: Awake and alert       LABS: No new Labs                          9.7    4.30  )-----------( 300      ( 25 Oct 2021 08:37 )             29.8                10.4   4.26  )-----------( 310      ( 23 Oct 2021 06:22 )             30.8         10-25    142  |  107  |  11  ----------------------------<  118<H>  4.0   |  27  |  1.22    Ca    9.4      25 Oct 2021 08:37    TPro  7.1  /  Alb  2.6<L>  /  TBili  0.7  /  DBili  x   /  AST  27  /  ALT  34  /  AlkPhos  125<H>  10-25    TPro  7.1  /  Alb  2.6<L>  /  TBili  0.7  /  DBili  x   /  AST  27  /  ALT  34  /  AlkPhos  125<H>  10-25    10-22    140  |  105  |  12  ----------------------------<  101<H>  4.3   |  27  |  1.07    Ca    9.7      22 Oct 2021 07:07  Phos  3.5     10-22  Mg     2.3     10-22    TPro  6.8  /  Alb  2.4<L>  /  TBili  0.7  /  DBili  x   /  AST  31  /  ALT  50  /  AlkPhos  130<H>  10-21      09-25    139  |  107  |  41<H>  ----------------------------<  134<H>  4.1   |  21<L>  |  4.05<H>    Ca    8.6      25 Sep 2021 06:40    TPro  6.6  /  Alb  2.3<L>  /  TBili  0.5  /  DBili  x   /  AST  25  /  ALT  15  /  AlkPhos  102  09-25        CAPILLARY BLOOD GLUCOSE  POCT Blood Glucose.: 134 mg/dL (17 Sep 2021 17:11)  POCT Blood Glucose.: 128 mg/dL (17 Sep 2021 11:06)  POCT Blood Glucose.: 157 mg/dL (17 Sep 2021 04:10)      Vancomycin Level, Trough (10.14.21 @ 11:32) : 21.8  Vancomycin Level, Trough (10.12.21 @ 12:13)   Vancomycin Level, Trough: 19.5:      MEDICATIONS  (STANDING):    apixaban 5 milliGRAM(s) Oral every 12 hours  aspirin  chewable 81 milliGRAM(s) Oral daily  atorvastatin 80 milliGRAM(s) Oral at bedtime  collagenase Ointment 1 Application(s) Topical daily  cyanocobalamin 1000 MICROGram(s) Oral daily  dextrose 5% + sodium chloride 0.45%. 1000 milliLiter(s) (75 mL/Hr) IV Continuous <Continuous>  dextrose 5%. 1000 milliLiter(s) (100 mL/Hr) IV Continuous <Continuous>  ergocalciferol 67700 Unit(s) Oral <User Schedule>  ferrous    sulfate Liquid 300 milliGRAM(s) Enteral Tube daily  finasteride 5 milliGRAM(s) Oral daily  gabapentin 100 milliGRAM(s) Oral three times a day  glucagon  Injectable 1 milliGRAM(s) IntraMuscular once  insulin lispro (ADMELOG) corrective regimen sliding scale   SubCutaneous Before meals and at bedtime  metoprolol tartrate 25 milliGRAM(s) Oral two times a day  NIFEdipine XL 60 milliGRAM(s) Oral daily  pantoprazole  Injectable 40 milliGRAM(s) IV Push at bedtime  piperacillin/tazobactam IVPB.. 3.375 Gram(s) IV Intermittent every 8 hours        RADIOLOGY & ADDITIONAL TESTS:    10/5/21 CT Chest No Cont (10.05.21 @ 14:07) Pulmonary edema with bilateral moderate to large pleural effusions. Underlying bilateral lower lobe compressive atelectasis versus pneumonia.  Mildly enlarged mediastinal lymph nodes, possibly reactive.      10/2/21: Xray Chest 1 View- PORTABLE-Routine (Xray Chest 1 View- PORTABLE-Routine in AM.) (10.02.21 @ 09:46) There is persistent bilateral perihilar/basilar diffuse airspace disease and/or RIGHT effusion.  Cardiomegaly.  No interval change.    Collected Date/Time: 9/22/2021 08:42 EDT   Received Date/Time: 9/23/2021 09:29 EDT   Surgical Pathology Report - Auth (Verified)   Specimen(s) Submitted   1 Right 5th Toe Wound Debridement r/o Osteomyelitis   Final Diagnosis   Right fifth toe; wound debridement:   - Bone with acute osteomyelitis and necrotic periosteal tissue.     9/28/21: US Abdomen Upper Quadrant Right (09.28.21 @ 12:13) Cholelithiasis without evidence of acute cholecystitis. Note is made of right pleural effusion    9/27/21: CT Abdomen and Pelvis w/ Oral Cont (09.27.21 @ 15:53) >No acute intra-abdominal pathology.    Small bilateral pleural effusions with compressive atelectasis of both lower lobes.    9/26/21: Xray Chest 1 View-PORTABLE IMMEDIATE (Xray Chest 1 View-PORTABLE IMMEDIATE .) (09.26.21 @ 15:28) : Small bilateral pleural effusions and mild pulmonary edema. A right lower lobe pneumonia is not excluded.      9/26/21: Xray Abdomen 1 View Portable, IMMEDIATE (Xray Abdomen 1 View Portable, IMMEDIATE .) (09.26.21 @ 15:28) : There is a nasogastric tube with its tip in the stomach. There is gaseous distention of the colon. No pathologic calcifications are seen. The osseous structures are intact with degenerative change is present in the spine.      9/17/21 : MR Foot No Cont, Right (09.17.21 @ 13:04) : Resection at the mid aspect of the fifth metatarsal. Lateral soft tissue wound with marrow signal abnormality throughout the fifth metatarsal and within the adjacent fourth metatarsal head which is nonspecific in the setting of recent surgery although osteomyelitis is suspected.    9/16/21 : Xray Foot AP + Lateral + Oblique, Right (09.16.21 @ 15:03) : No acute finding. No plain film evidence of osteomyelitis.        MICROBIOLOGY DATA:    COVID-19 PCR (10.11.21 @ 05:44)   COVID-19 PCR: NotDetec:   Urine Microscopic-Add On (NC) (09.22.21 @ 16:14)   Red Blood Cell - Urine: 0-2 /HPF   White Blood Cell - Urine: 3-5 /HPF   Bacteria: Moderate /HPF   Comment - Urine: yeast cells present   Epithelial Cells: Moderate /HPF     Culture - Tissue with Gram Stain (09.22.21 @ 13:54)   Gram Stain: No polymorphonuclear cells seen per low power field   No organisms seen per oil power field   Specimen Source: .Tissue Right 5th toe r/o osteomyeltis   Culture Results: No growth     COVID-19 David Domain Antibody (09.18.21 @ 12:55)   COVID-19 David Domain Antibody Result: >250.00:    Culture - Blood (09.17.21 @ 10:28)   Specimen Source: .Blood Blood-Peripheral   Culture Results: No growth to date.     Culture - Blood (09.17.21 @ 10:28)   Specimen Source: .Blood Blood-Venous   Culture Results: No growth to date.     Culture - Surgical Swab (09.16.21 @ 21:42)   - Amikacin: S <=16   - Amikacin: S <=16   - Amoxicillin/Clavulanic Acid: S <=8/4   - Ampicillin: R >16 These ampicillin results predict results for amoxicillin   - Ampicillin: S <=2 Predicts results to ampicillin/sulbactam, amoxacillin-clavulanate and piperacillin-tazobactam.   - Ampicillin/Sulbactam: S 8/4 Enterobacter, Citrobacter, and Serratia may develop resistance during prolonged therapy (3-4 days)   - Ampicillin/Sulbactam: R >16/8   - Aztreonam: S <=4   - Aztreonam: S <=4   - Cefazolin: R 16 Enterobacter, Citrobacter, and Serratia may develop resistance during prolonged therapy (3-4 days)   - Cefazolin: R >16   - Cefepime: S <=2   - Cefepime: S <=2   - Cefoxitin: S <=8   - Cefoxitin: S <=8   - Ceftazidime: S <=1   - Ceftriaxone: S <=1   - Ceftriaxone: S <=1 Enterobacter, Citrobacter, and Serratia may develop resistance during prolonged therapy   - Ciprofloxacin: S <=0.25   - Ciprofloxacin: S <=0.25   - Ertapenem: S <=0.5   - Gentamicin: S <=2   - Gentamicin: S <=2   - Imipenem: S <=1   - Levofloxacin: S <=0.5   - Levofloxacin: S <=0.5   - Meropenem: S <=1   - Meropenem: S <=1   - Piperacillin/Tazobactam: S <=8   - Piperacillin/Tazobactam: S <=8   - Tetra/Doxy: S 4   - Tobramycin: S <=2   - Trimethoprim/Sulfamethoxazole: S <=0.5/9.5   - Trimethoprim/Sulfamethoxazole: S <=0.5/9.5   - Vancomycin: S 2   Specimen Source: .Surgical Swab right foot wound   Culture Results:   Culture yields >4 types of aerobic and/or anaerobic bacteria   Call client services within 7 days if further workup is clinically   indicated. Culture includes   Rare Aeromonas hydrophila/caviae   Few Klebsiella oxytoca/Raoutella ornithinolytica   Few Enterococcus faecalis   Few Streptococcus agalactiae (Group B) isolated   Group B streptococci are susceptible to ampicillin,   penicillin and cefazolin, but may be resistant to   erythromycin and clindamycin.   Recommendations for intrapartum prophylaxis for Group B   streptococci are penicillin or ampicillin.   Organism Identification: Aeromonas hydrophila/caviae   Klebsiella oxytoca /Raoutella ornithinolytica   Enterococcus faecalis   Organism: Aeromonas hydrophila/caviae   Organism: Klebsiella oxytoca /Raoutella ornithinolytica   Organism: Enterococcus faecalis   COVID-19 PCR . (09.16.21 @ 22:24)   COVID-19 PCR: NotDetec:         Patient is seen and examined at the bed side, is afebrile.  The creatinine is trending back up. H ere- exposed to a COVID patient and need to be in [facility] until 11/1/21.      REVIEW OF SYSTEMS: All other review systems are negative      ALLERGIES: No Known Allergies      Vital Signs Last 24 Hrs  T(C): 36.7 (26 Oct 2021 13:47), Max: 36.7 (26 Oct 2021 13:47)  T(F): 98 (26 Oct 2021 13:47), Max: 98 (26 Oct 2021 13:47)  HR: 75 (26 Oct 2021 13:47) (65 - 83)  BP: 128/64 (26 Oct 2021 13:47) (124/60 - 132/69)  BP(mean): --  RR: 16 (26 Oct 2021 13:47) (16 - 18)  SpO2: 100% (26 Oct 2021 13:47) (95% - 100%)      PHYSICAL EXAM:  GENERAL: Not in distress  CHEST/LUNG:  Not using accessory muscles, s/p removal of Right CT   HEART: s1 and s2 present  ABDOMEN:  Mild distended   EXTREMITIES: Right foot bandage in placed   CNS: Awake and alert       LABS: No new Labs                          9.7    4.30  )-----------( 300      ( 25 Oct 2021 08:37 )             29.8                10.4   4.26  )-----------( 310      ( 23 Oct 2021 06:22 )             30.8         10-25    142  |  107  |  11  ----------------------------<  118<H>  4.0   |  27  |  1.22    Ca    9.4      25 Oct 2021 08:37    TPro  7.1  /  Alb  2.6<L>  /  TBili  0.7  /  DBili  x   /  AST  27  /  ALT  34  /  AlkPhos  125<H>  10-25    TPro  7.1  /  Alb  2.6<L>  /  TBili  0.7  /  DBili  x   /  AST  27  /  ALT  34  /  AlkPhos  125<H>  10-25    10-22    140  |  105  |  12  ----------------------------<  101<H>  4.3   |  27  |  1.07    Ca    9.7      22 Oct 2021 07:07  Phos  3.5     10-22  Mg     2.3     10-22    TPro  6.8  /  Alb  2.4<L>  /  TBili  0.7  /  DBili  x   /  AST  31  /  ALT  50  /  AlkPhos  130<H>  10-21      09-25    139  |  107  |  41<H>  ----------------------------<  134<H>  4.1   |  21<L>  |  4.05<H>    Ca    8.6      25 Sep 2021 06:40    TPro  6.6  /  Alb  2.3<L>  /  TBili  0.5  /  DBili  x   /  AST  25  /  ALT  15  /  AlkPhos  102  09-25        CAPILLARY BLOOD GLUCOSE  POCT Blood Glucose.: 134 mg/dL (17 Sep 2021 17:11)  POCT Blood Glucose.: 128 mg/dL (17 Sep 2021 11:06)  POCT Blood Glucose.: 157 mg/dL (17 Sep 2021 04:10)      Vancomycin Level, Trough (10.14.21 @ 11:32) : 21.8  Vancomycin Level, Trough (10.12.21 @ 12:13)   Vancomycin Level, Trough: 19.5:      MEDICATIONS  (STANDING):    apixaban 5 milliGRAM(s) Oral every 12 hours  aspirin  chewable 81 milliGRAM(s) Oral daily  atorvastatin 80 milliGRAM(s) Oral at bedtime  collagenase Ointment 1 Application(s) Topical daily  cyanocobalamin 1000 MICROGram(s) Oral daily  dextrose 5% + sodium chloride 0.45%. 1000 milliLiter(s) (75 mL/Hr) IV Continuous <Continuous>  dextrose 5%. 1000 milliLiter(s) (100 mL/Hr) IV Continuous <Continuous>  ergocalciferol 71753 Unit(s) Oral <User Schedule>  ferrous    sulfate Liquid 300 milliGRAM(s) Enteral Tube daily  finasteride 5 milliGRAM(s) Oral daily  gabapentin 100 milliGRAM(s) Oral three times a day  glucagon  Injectable 1 milliGRAM(s) IntraMuscular once  insulin lispro (ADMELOG) corrective regimen sliding scale   SubCutaneous Before meals and at bedtime  metoprolol tartrate 25 milliGRAM(s) Oral two times a day  NIFEdipine XL 60 milliGRAM(s) Oral daily  pantoprazole  Injectable 40 milliGRAM(s) IV Push at bedtime  piperacillin/tazobactam IVPB.. 3.375 Gram(s) IV Intermittent every 8 hours        RADIOLOGY & ADDITIONAL TESTS:    10/5/21 CT Chest No Cont (10.05.21 @ 14:07) Pulmonary edema with bilateral moderate to large pleural effusions. Underlying bilateral lower lobe compressive atelectasis versus pneumonia.  Mildly enlarged mediastinal lymph nodes, possibly reactive.      10/2/21: Xray Chest 1 View- PORTABLE-Routine (Xray Chest 1 View- PORTABLE-Routine in AM.) (10.02.21 @ 09:46) There is persistent bilateral perihilar/basilar diffuse airspace disease and/or RIGHT effusion.  Cardiomegaly.  No interval change.    Collected Date/Time: 9/22/2021 08:42 EDT   Received Date/Time: 9/23/2021 09:29 EDT   Surgical Pathology Report - Auth (Verified)   Specimen(s) Submitted   1 Right 5th Toe Wound Debridement r/o Osteomyelitis   Final Diagnosis   Right fifth toe; wound debridement:   - Bone with acute osteomyelitis and necrotic periosteal tissue.     9/28/21: US Abdomen Upper Quadrant Right (09.28.21 @ 12:13) Cholelithiasis without evidence of acute cholecystitis. Note is made of right pleural effusion    9/27/21: CT Abdomen and Pelvis w/ Oral Cont (09.27.21 @ 15:53) >No acute intra-abdominal pathology.    Small bilateral pleural effusions with compressive atelectasis of both lower lobes.    9/26/21: Xray Chest 1 View-PORTABLE IMMEDIATE (Xray Chest 1 View-PORTABLE IMMEDIATE .) (09.26.21 @ 15:28) : Small bilateral pleural effusions and mild pulmonary edema. A right lower lobe pneumonia is not excluded.      9/26/21: Xray Abdomen 1 View Portable, IMMEDIATE (Xray Abdomen 1 View Portable, IMMEDIATE .) (09.26.21 @ 15:28) : There is a nasogastric tube with its tip in the stomach. There is gaseous distention of the colon. No pathologic calcifications are seen. The osseous structures are intact with degenerative change is present in the spine.      9/17/21 : MR Foot No Cont, Right (09.17.21 @ 13:04) : Resection at the mid aspect of the fifth metatarsal. Lateral soft tissue wound with marrow signal abnormality throughout the fifth metatarsal and within the adjacent fourth metatarsal head which is nonspecific in the setting of recent surgery although osteomyelitis is suspected.    9/16/21 : Xray Foot AP + Lateral + Oblique, Right (09.16.21 @ 15:03) : No acute finding. No plain film evidence of osteomyelitis.        MICROBIOLOGY DATA:    COVID-19 PCR (10.11.21 @ 05:44)   COVID-19 PCR: NotDetec:   Urine Microscopic-Add On (NC) (09.22.21 @ 16:14)   Red Blood Cell - Urine: 0-2 /HPF   White Blood Cell - Urine: 3-5 /HPF   Bacteria: Moderate /HPF   Comment - Urine: yeast cells present   Epithelial Cells: Moderate /HPF     Culture - Tissue with Gram Stain (09.22.21 @ 13:54)   Gram Stain: No polymorphonuclear cells seen per low power field   No organisms seen per oil power field   Specimen Source: .Tissue Right 5th toe r/o osteomyeltis   Culture Results: No growth     COVID-19 David Domain Antibody (09.18.21 @ 12:55)   COVID-19 David Domain Antibody Result: >250.00:    Culture - Blood (09.17.21 @ 10:28)   Specimen Source: .Blood Blood-Peripheral   Culture Results: No growth to date.     Culture - Blood (09.17.21 @ 10:28)   Specimen Source: .Blood Blood-Venous   Culture Results: No growth to date.     Culture - Surgical Swab (09.16.21 @ 21:42)   - Amikacin: S <=16   - Amikacin: S <=16   - Amoxicillin/Clavulanic Acid: S <=8/4   - Ampicillin: R >16 These ampicillin results predict results for amoxicillin   - Ampicillin: S <=2 Predicts results to ampicillin/sulbactam, amoxacillin-clavulanate and piperacillin-tazobactam.   - Ampicillin/Sulbactam: S 8/4 Enterobacter, Citrobacter, and Serratia may develop resistance during prolonged therapy (3-4 days)   - Ampicillin/Sulbactam: R >16/8   - Aztreonam: S <=4   - Aztreonam: S <=4   - Cefazolin: R 16 Enterobacter, Citrobacter, and Serratia may develop resistance during prolonged therapy (3-4 days)   - Cefazolin: R >16   - Cefepime: S <=2   - Cefepime: S <=2   - Cefoxitin: S <=8   - Cefoxitin: S <=8   - Ceftazidime: S <=1   - Ceftriaxone: S <=1   - Ceftriaxone: S <=1 Enterobacter, Citrobacter, and Serratia may develop resistance during prolonged therapy   - Ciprofloxacin: S <=0.25   - Ciprofloxacin: S <=0.25   - Ertapenem: S <=0.5   - Gentamicin: S <=2   - Gentamicin: S <=2   - Imipenem: S <=1   - Levofloxacin: S <=0.5   - Levofloxacin: S <=0.5   - Meropenem: S <=1   - Meropenem: S <=1   - Piperacillin/Tazobactam: S <=8   - Piperacillin/Tazobactam: S <=8   - Tetra/Doxy: S 4   - Tobramycin: S <=2   - Trimethoprim/Sulfamethoxazole: S <=0.5/9.5   - Trimethoprim/Sulfamethoxazole: S <=0.5/9.5   - Vancomycin: S 2   Specimen Source: .Surgical Swab right foot wound   Culture Results:   Culture yields >4 types of aerobic and/or anaerobic bacteria   Call client services within 7 days if further workup is clinically   indicated. Culture includes   Rare Aeromonas hydrophila/caviae   Few Klebsiella oxytoca/Raoutella ornithinolytica   Few Enterococcus faecalis   Few Streptococcus agalactiae (Group B) isolated   Group B streptococci are susceptible to ampicillin,   penicillin and cefazolin, but may be resistant to   erythromycin and clindamycin.   Recommendations for intrapartum prophylaxis for Group B   streptococci are penicillin or ampicillin.   Organism Identification: Aeromonas hydrophila/caviae   Klebsiella oxytoca /Raoutella ornithinolytica   Enterococcus faecalis   Organism: Aeromonas hydrophila/caviae   Organism: Klebsiella oxytoca /Raoutella ornithinolytica   Organism: Enterococcus faecalis   COVID-19 PCR . (09.16.21 @ 22:24)   COVID-19 PCR: NotDetec:

## 2021-10-26 NOTE — PROGRESS NOTE ADULT - SUBJECTIVE AND OBJECTIVE BOX
Patient is a 76y old  Male who presents with a chief complaint of R Foot Pain (25 Oct 2021 21:17)    PATIENT IS SEEN AND EXAMINED IN MEDICAL FLOOR.  KEENANT [    ]    MADDY [   ]      GT [   ]    ALLERGIES:  No Known Allergies      Daily     Daily     VITALS:    Vital Signs Last 24 Hrs  T(C): 36.6 (26 Oct 2021 05:30), Max: 36.6 (25 Oct 2021 19:29)  T(F): 97.9 (26 Oct 2021 05:30), Max: 97.9 (26 Oct 2021 05:30)  HR: 83 (26 Oct 2021 05:30) (65 - 83)  BP: 124/60 (26 Oct 2021 05:30) (124/60 - 132/69)  BP(mean): --  RR: 18 (26 Oct 2021 05:30) (18 - 18)  SpO2: 95% (26 Oct 2021 05:30) (95% - 100%)    LABS:    CBC Full  -  ( 25 Oct 2021 08:37 )  WBC Count : 4.30 K/uL  RBC Count : 3.28 M/uL  Hemoglobin : 9.7 g/dL  Hematocrit : 29.8 %  Platelet Count - Automated : 300 K/uL  Mean Cell Volume : 90.9 fl  Mean Cell Hemoglobin : 29.6 pg  Mean Cell Hemoglobin Concentration : 32.6 gm/dL  Auto Neutrophil # : x  Auto Lymphocyte # : x  Auto Monocyte # : x  Auto Eosinophil # : x  Auto Basophil # : x  Auto Neutrophil % : x  Auto Lymphocyte % : x  Auto Monocyte % : x  Auto Eosinophil % : x  Auto Basophil % : x      10-25    142  |  107  |  11  ----------------------------<  118<H>  4.0   |  27  |  1.22    Ca    9.4      25 Oct 2021 08:37    TPro  7.1  /  Alb  2.6<L>  /  TBili  0.7  /  DBili  x   /  AST  27  /  ALT  34  /  AlkPhos  125<H>  10-25    CAPILLARY BLOOD GLUCOSE      POCT Blood Glucose.: 136 mg/dL (26 Oct 2021 11:34)  POCT Blood Glucose.: 143 mg/dL (26 Oct 2021 08:05)  POCT Blood Glucose.: 136 mg/dL (25 Oct 2021 21:29)  POCT Blood Glucose.: 172 mg/dL (25 Oct 2021 16:10)        LIVER FUNCTIONS - ( 25 Oct 2021 08:37 )  Alb: 2.6 g/dL / Pro: 7.1 g/dL / ALK PHOS: 125 U/L / ALT: 34 U/L DA / AST: 27 U/L / GGT: x           Creatinine Trend: 1.22<--, 1.07<--, 0.84<--, 1.00<--, 0.97<--, 0.97<--  I&O's Summary    25 Oct 2021 07:01  -  26 Oct 2021 07:00  --------------------------------------------------------  IN: 0 mL / OUT: 1300 mL / NET: -1300 mL            .Body Fluid Pleural Fluid  10-08 @ 18:51   No growth at 1 week.  --  --      .Body Fluid Pleural Fluid  10-08 @ 18:34   No growth at 5 days  --    polymorphonuclear leukocytes seen  No organisms seen  by cytocentrifuge      .Tissue Right 5th toe r/o osteomyeltis  09-22 @ 13:54   No growth at 5 days  --    No polymorphonuclear cells seen per low power field  No organisms seen per oil power field      .Blood Blood-Venous  09-17 @ 10:28   No Growth Final  --  --      .Surgical Swab right foot wound  09-16 @ 21:42   Culture yields >4 types of aerobic and/or anaerobic bacteria  Call client services within 7 days if further workup is clinically  indicated. Culture includes  Rare Aeromonas hydrophila/caviae  Few Klebsiella oxytoca/Raoutella ornithinolytica  Few Enterococcus faecalis  Few Streptococcus agalactiae (Group B) isolated  Group B streptococci are susceptible to ampicillin,  penicillin and cefazolin, but may be resistant to  erythromycin and clindamycin.  Recommendations for intrapartum prophylaxis for Group B  streptococci are penicillin or ampicillin.  --  Aeromonas hydrophila/caviae  Klebsiella oxytoca /Raoutella ornithinolytica  Enterococcus faecalis      Bone R 5th metatarsal bone clean ma  08-20 @ 03:59   No growth at 5 days  --    No polymorphonuclear leukocytes seen per low power field  No organisms seen per oil power field      .Blood Blood-Venous  08-14 @ 21:09   No Growth Final  --  --      .Surgical Swab Right foot plantar wound  08-14 @ 21:08   Moderate Streptococcus agalactiae (Group B) isolated  Group B streptococci are susceptible to ampicillin,  penicillin and cefazolin, but may be resistant to  erythromycin and clindamycin.  Recommendations for intrapartum prophylaxis for Group B  streptococci are penicillin or ampicillin.  Moderate Bacteroides fragilis "Susceptibilities not performed"  Normal skin filiberto isolated  --  --          MEDICATIONS:    MEDICATIONS  (STANDING):  apixaban 5 milliGRAM(s) Oral every 12 hours  aspirin  chewable 81 milliGRAM(s) Oral daily  atorvastatin 80 milliGRAM(s) Oral at bedtime  collagenase Ointment 1 Application(s) Topical daily  cyanocobalamin 1000 MICROGram(s) Oral daily  dextrose 5% + sodium chloride 0.45%. 1000 milliLiter(s) (75 mL/Hr) IV Continuous <Continuous>  dextrose 5%. 1000 milliLiter(s) (100 mL/Hr) IV Continuous <Continuous>  ergocalciferol 39325 Unit(s) Oral <User Schedule>  ferrous    sulfate Liquid 300 milliGRAM(s) Enteral Tube daily  finasteride 5 milliGRAM(s) Oral daily  gabapentin 100 milliGRAM(s) Oral three times a day  glucagon  Injectable 1 milliGRAM(s) IntraMuscular once  insulin lispro (ADMELOG) corrective regimen sliding scale   SubCutaneous Before meals and at bedtime  metoprolol tartrate 25 milliGRAM(s) Oral two times a day  NIFEdipine XL 60 milliGRAM(s) Oral daily  pantoprazole  Injectable 40 milliGRAM(s) IV Push at bedtime  piperacillin/tazobactam IVPB.. 3.375 Gram(s) IV Intermittent every 8 hours      MEDICATIONS  (PRN):  acetaminophen   Tablet .. 650 milliGRAM(s) Oral every 6 hours PRN Temp greater or equal to 38C (100.4F), Moderate Pain (4 - 6)  ondansetron Injectable 4 milliGRAM(s) IV Push three times a day PRN Nausea and/or Vomiting  sodium chloride 0.9% lock flush 10 milliLiter(s) IV Push every 1 hour PRN Pre/post blood products, medications, blood draw, and to maintain line patency      REVIEW OF SYSTEMS:                           ALL ROS DONE [ X   ]    CONSTITUTIONAL:  LETHARGIC [   ], FEVER [   ], UNRESPONSIVE [   ]  CVS:  CP  [   ], SOB, [   ], PALPITATIONS [   ], DIZZYNESS [   ]  RS: COUGH [   ], SPUTUM [   ]  GI: ABDOMINAL PAIN [   ], NAUSEA [   ], VOMITINGS [   ], DIARRHEA [   ], CONSTIPATION [   ]  :  DYSURIA [   ], NOCTURIA [   ], INCREASED FREQUENCY [   ], DRIBLING [   ],  SKELETAL: PAINFUL JOINTS [   ], SWOLLEN JOINTS [   ], NECK ACHE [   ], LOW BACK ACHE [   ],  SKIN : ULCERS [   ], RASH [   ], ITCHING [   ]  CNS: HEAD ACHE [   ], DOUBLE VISION [   ], BLURRED VISION [   ], AMS / CONFUSION [   ], SEIZURES [   ], WEAKNESS [   ],TINGLING / NUMBNESS [   ]      PHYSICAL EXAMINATION:    GENERAL APPEARANCE: NO DISTRESS  HEENT:  NO PALLOR, NO  JVD,  NO   NODES, NECK SUPPLE  CVS: S1 +, S2 +,   RS: AEEB,  OCCASIONAL  RALES +,  RONCHI +  ABD: SOFT, NT, NO, BS +       EXT: NO PE  SKIN: WARM,   RIGHT FOOT WRAPPED  SKELETAL:  ROM ACCEPTABLE  CNS:  AAO X  2-3        RADIOLOGY :    < from: Transthoracic Echocardiogram (10.07.21 @ 15:26) >  CONCLUSIONS:  1. Normal mitral valve. Moderate mitral regurgitation.  2. Calcified aortic valve with decreased opening, cannot  exclude bicuspid. Peak transaortic valve gradient equals  32.4 mm Hg, mean transaortic valve gradient equals 19 mm  Hg, dimensionless index 0.22, estimated aortic valve area  equals 0.6sqcm (by continuity equation), consistent with  paradoxical low-flow low-gradient severe aortic stenosis.  Consider dobutamine stress echocardiogram and/or SABIHA for  further evaluation.  No aortic valve regurgitation seen.  3. Normal aortic root.  4. Normal left atrium.  5. Moderate concentric left ventricular hypertrophy.  6. Moderate global left ventricular systolic dysfunction  (EF 40-45% by visual estimation).  7. Grade II diastolic dysfunction (moderate).  8. Normal right atrium.  9. Normal right ventricular size with decreased RV systolic  function (TAPSE 1.2 cm).  10. RV systolic pressure is borderline normal at  34 mm Hg.  11. Tricuspid valve not well seen. Trace tricuspid  regurgitation.  12. Normal pulmonic valve. Trace pulmonic insufficiency is  noted.  13. No pericardial effusion.  14. Bilateral pleural effusions.    < end of copied text >      EXAM:  CT ABDOMEN AND PELVIS OC                            PROCEDURE DATE:  09/27/2021          INTERPRETATION:  CLINICAL INFORMATION: Small bowel obstruction.    COMPARISON: None.    CONTRAST/COMPLICATIONS:  IV Contrast: NONE  Oral Contrast: Omnipaque 300  Complications: None reported at time of study completion    PROCEDURE:  CT of the Abdomen and Pelvis was performed.  Sagittal and coronal reformats were performed.    FINDINGS:  LOWER CHEST: Small bilateral pleural effusions with compressiveatelectasis of both lower lobes.    LIVER: Within normal limits.  BILE DUCTS: Normal caliber.  GALLBLADDER: Distended. Small layering gallstones.  SPLEEN: Within normal limits.  PANCREAS: Within normal limits.  ADRENALS: Within normal limits.  KIDNEYS/URETERS: No hydronephrosis. Nonobstructing left upper pole calculus, measuring 0.6 cm.    BLADDER: Urinary bladder contains air and a Castañeda catheter balloon.  REPRODUCTIVE ORGANS: Prostate is not enlarged.    BOWEL: No bowel obstruction. Appendix isnormal. Colonic diverticulosis.  PERITONEUM: Mild presacral fluid.  VESSELS: Atherosclerotic changes.  RETROPERITONEUM/LYMPH NODES: No lymphadenopathy.  ABDOMINAL WALL: Within normal limits.  BONES: Degenerative changes. Sternotomy.    IMPRESSION:  No acute intra-abdominal pathology.    Small bilateral pleural effusions with compressive atelectasis of both lower lobes.    ====================================================    EXAM:  MR FOOT RT                            PROCEDURE DATE:  09/17/2021          INTERPRETATION:  Clinical Information: Recent fifth metatarsal head resection now with fifth digit pain, swelling and foul discharge.    Comparison: Radiographs of the right foot from 9/16/2021 and MRI the right foot from 8/16/2021.    Technique:  MRI of the right midfoot and forefoot.  Intravenous Contrast: None.    Findings:    There is a resection at the mid aspect of the fifth metatarsal shaft. There is a lateral soft tissue wound beginning at the level of the amputation which extends distally. There is susceptibility artifact in the region of the amputation consistent with postoperative change. There is hyperintense T2 marrow signal throughout the remaining fifth metatarsal and within the adjacent fourth metatarsal head which is nonspecific and could be related to recent postoperative changes although osteomyelitis is suspected.    There is edema and mild atrophy within the plantar muscles of the foot. There is minimal spurring at the first metatarsophalangeal and hallux sesamoid articulations.    Impression:  Resection at the mid aspect of the fifth metatarsal. Lateral soft tissue wound with marrow signal abnormality throughout the fifth metatarsal and within the adjacent fourth metatarsal head which is nonspecific in the setting of recent surgery although osteomyelitis is suspected.        ASSESSMENT :     Headache    HTN (hypertension)    HLD (hyperlipidemia)    DM (diabetes mellitus)    CAD (coronary artery disease)    Glaucoma, angle-closure    [place] (hard of hearing)      S/P CABG (coronary artery bypass graft)    History of thyroid surgery        PLAN:    HPI:  76M from home, lives with daughter w/ PMH DM on insulin, HTN, HLD, CAD, 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 Pt with sharp pain on the R lateral foot. As per daughter, pt was taking tylenol which did not help his pain prompting the patient to ask his daughter to take him to the hospital. Pt is a poor historian. Daughter states that the patient did not see his podiatrist after the surgery. No fever, chest, pain, palpitations, nausea, vomiting, diarrhea (17 Sep 2021 01:11)    # CASE D/W PATIENT CARE TEAM CHERELLE DIAL TO HELP ASSIST WITH PATIENT AND FAMILY'S NEEDS AT THIS TIME     # PATIENT IS S/P ICU MONITORING AND RIGHT CHEST TUBE REMOVAL ON 10/15/2021     # COVID EXPOSURE ON 10/13 AND ONCE MORE HAD RE-EXPOSURE ON 10/22 - INFECTION CONTROL IS AWARE AND ADDRESSING - ON CONTACT AND DROPLET ISOLATION PRECAUTION; F/U COVID PCR    # SUSPECT RIGHT FOOT OSTEOMYELITIS S/P RIGHT 5TH RAY RESECTION [8/19] AND S/P DEBRIDEMENT, GRAFT APPLICATION, BONE BX AND WOUND VAC APPLICATION 9/22 - BONE BX W/ ACUTE OSTEOMYELITIS AND NECROTIC PERIOSTEAL TISSUE  ** RECENT ADMISSION FOR RIGHT DIABETIC FOOT ULCER, CELLULITIS, OSTEOMYELITIS RIGHT 5th METATARSAL S/P RIGHT 5TH PARTIAL RAY AMPUTATION [8/20] - BONE PATHOLOGY W/ CLEAN MARGINS    - S/P VANCOMYCIN AND ZOSYN ; NOW ON UNASYN AND LEVAQUIN GIVEN OREN; PER ID SWITCH TO ZOSYN UNTIL 11/3   - RECENTLY HAD SIMON W/ MILD PAD  - REVIEWED WOUND CX [ ENTEROCOCCUS, AEROMONAS, KLEBSIELLA] AND BCX  - ID CONSULT, PODIATRY CONSULT    - PICC LINE PLACED TO COMPLETE 6 WEEKS OF ANTIBIOTICS - ON ZOSYN UNTIL 11/3 ON D/C    # ACUTE HYPOXIC RESPIRATORY FAILURE DUE TO ACUTE ON CHRONIC SYSTOLIC CHF  (  LV EF 40- 45 % ) , DIASTOLIC LV DYSFUNCTION , ,  SEVERE AORTIC STENOSIS & MODERATE MITRAL REGURGITATION  &  ASPIRATION PNA;  - S/P CHEST TUBE INSERTION AND REMOVAL  , S/P LASIX ,   CARDIOLOGY CONSULT IN PROGRESS      - CHEST TUBE PLACED [10/8] - FLUID CONSISTENT WITH TRANSUDATIVE PROCESS  - S/P EXTUBATION 10/13  - S/P CHEST TUBE REMOVAL 10/15      # SEPTIC SHOCK - ? S/T HYPOVOLEMIA VS. SEPSIS - S/P CVC [SWITCHED FROM FEMORAL TO LEFT IJ], S/P VASOPRESSORS - RESOLVING  - BCX - NGTD  - ON MEROPENEM      # TRANSIENT NEW ONSET A.FIB, PAROXYSMAL - ON ELIQUIS, CARDIOLOGY CONSULT IN PROGRESS    # FUNGURIA - D/C FLUCONAZOLE GIVEN TRANSAMINITIS    # TRANSAMINITIS - SUSPECT THIS IS ISCHEMIC HEPATITIS - IMPROVING - HEPATOLOGY CONSULT IN PROGRESS    # BOWEL DISTENTION - ? ILEUS - OPTIMIZING ELECTROLYTES - IMPROVED  - SURGERY CONSULT IN PROGRESS    # CHOLELITHIASIS - TRENDING LFTS, RUQ U/S - CHOLELITHIASIS, S/P SURGERY CONSULT   - S/P GI CONSULT - LESS SUSPICIOUS FOR CHOLECYSTITIS    # DYSPEPSIA - PLACED ON PPI BID WITH IMPROVEMENT, UNDERWENT ST EVAL     # ELEVATED TROPONINS - NSTEMI - ? DEMAND - WILL NEED ISCHEMIC EVAL ONCE ACUTE INFECTION RESOLVES PER CARDIOLOGY, CARDIOLOGY CONSULT IN PROGRESS  - ON ASA, STATIN, BB    # OREN ON CKD - S/T ATN AND URINARY RETENTION - S/P IVF, NOW W/ CASTAÑEDA, NEPHROLOGY CONUSLT IN PROGRESS  -  BPH ON FLOMAX, AND FINASTERIDE  - FAILED TOV WITH WORSENING RENAL FXN - NOTED URINARY RETENTION [10/4] - REPLACED CASTAÑEDA  - TOV 10/18 - BLADDER SCAN BID TO EVALUATE FOR URINARY RETENTION - FAILED TOV  - OVERNIGHT 10/18 - CASTAÑEDA REPLACED    # MEDICAL CLEARANCE FOR SURGERY - RCRI - 2 - 10.1 % 30 DAY RISK OF DEATH, MI OR CARDIAC ARREST  - CARDIOLOGY CONSULT     # DM -  HBA1C - 11  - LANTUS, SSI + FS    # CAD S/P CABG - PLACED ON ASA, STATIN, BB  - ECHO - SEVERE AORTIC STENOSIS, SEVERE CONCENTRIC LVH, G1DD, MILD NM  - CARDIOLOGY CONSULT - DR. BASSETT   - MAY NEED ISCHEMIC EVAL ONCE ACUTE ILLNESS RESOLVES INCLUDING CARDIAC CATHETERIZATION    # HTN, HLD  - ON METOPROLOL, NIFEDIPINE, STATIN       #  IMPAIRED GAIT DUE TO CERVICAL & LS SPONDYLOSIS, POLY ARTHRITIS AND DIABETIC PERIPHERAL NEUROPATHY, OP VITAMIN D DEFICIENCY - ON CHOLECALCIFEROL, OBTAIN PT & OT   #  FAILURE TO THRIVE , MODERATE PROTEIN CALORIE MALNUTRITION       # CASE DISCUSSED AT LENGTH WITH PATIENT AND DAUGHTER, BIRDIE ROBERT VIA PHONE @ [phone] [10/25] - CASE DICUSSED AT LENGTH AND ALL QUESTIONS WERE ANSWERED. DAUGHTER UNDERSTOOD THAT PROGNOSIS IS GUARDED.  # PATIENT AND DAUGHTER REFUSED [facility] ON PREVIOUS ADMISSION, PREVIOUSLY WISHED FOR PATIENT RETURN HOME W/ HOME PT; HOWEVER NOW, DAUGHTER WISHES FOR PATIENT TO GO TO [facility], AS PATIENT'S WIFE IS CURRENTLY ADMITTED THERE    # GI AND DVT PPX   Patient is a 76y old  Male who presents with a chief complaint of R Foot Pain (25 Oct 2021 21:17)    PATIENT IS SEEN AND EXAMINED IN MEDICAL FLOOR.    ALLERGIES:  No Known Allergies    VITALS:    Vital Signs Last 24 Hrs  T(C): 36.6 (26 Oct 2021 05:30), Max: 36.6 (25 Oct 2021 19:29)  T(F): 97.9 (26 Oct 2021 05:30), Max: 97.9 (26 Oct 2021 05:30)  HR: 83 (26 Oct 2021 05:30) (65 - 83)  BP: 124/60 (26 Oct 2021 05:30) (124/60 - 132/69)  BP(mean): --  RR: 18 (26 Oct 2021 05:30) (18 - 18)  SpO2: 95% (26 Oct 2021 05:30) (95% - 100%)    LABS:    CBC Full  -  ( 25 Oct 2021 08:37 )  WBC Count : 4.30 K/uL  RBC Count : 3.28 M/uL  Hemoglobin : 9.7 g/dL  Hematocrit : 29.8 %  Platelet Count - Automated : 300 K/uL  Mean Cell Volume : 90.9 fl  Mean Cell Hemoglobin : 29.6 pg  Mean Cell Hemoglobin Concentration : 32.6 gm/dL  Auto Neutrophil # : x  Auto Lymphocyte # : x  Auto Monocyte # : x  Auto Eosinophil # : x  Auto Basophil # : x  Auto Neutrophil % : x  Auto Lymphocyte % : x  Auto Monocyte % : x  Auto Eosinophil % : x  Auto Basophil % : x      10-25    142  |  107  |  11  ----------------------------<  118<H>  4.0   |  27  |  1.22    Ca    9.4      25 Oct 2021 08:37    TPro  7.1  /  Alb  2.6<L>  /  TBili  0.7  /  DBili  x   /  AST  27  /  ALT  34  /  AlkPhos  125<H>  10-25    CAPILLARY BLOOD GLUCOSE      POCT Blood Glucose.: 136 mg/dL (26 Oct 2021 11:34)  POCT Blood Glucose.: 143 mg/dL (26 Oct 2021 08:05)  POCT Blood Glucose.: 136 mg/dL (25 Oct 2021 21:29)  POCT Blood Glucose.: 172 mg/dL (25 Oct 2021 16:10)        LIVER FUNCTIONS - ( 25 Oct 2021 08:37 )  Alb: 2.6 g/dL / Pro: 7.1 g/dL / ALK PHOS: 125 U/L / ALT: 34 U/L DA / AST: 27 U/L / GGT: x           Creatinine Trend: 1.22<--, 1.07<--, 0.84<--, 1.00<--, 0.97<--, 0.97<--  I&O's Summary    25 Oct 2021 07:01  -  26 Oct 2021 07:00  --------------------------------------------------------  IN: 0 mL / OUT: 1300 mL / NET: -1300 mL            .Body Fluid Pleural Fluid  10-08 @ 18:51   No growth at 1 week.  --  --      .Body Fluid Pleural Fluid  10-08 @ 18:34   No growth at 5 days  --    polymorphonuclear leukocytes seen  No organisms seen  by cytocentrifuge      .Tissue Right 5th toe r/o osteomyeltis  09-22 @ 13:54   No growth at 5 days  --    No polymorphonuclear cells seen per low power field  No organisms seen per oil power field      .Blood Blood-Venous  09-17 @ 10:28   No Growth Final  --  --      .Surgical Swab right foot wound  09-16 @ 21:42   Culture yields >4 types of aerobic and/or anaerobic bacteria  Call client services within 7 days if further workup is clinically  indicated. Culture includes  Rare Aeromonas hydrophila/caviae  Few Klebsiella oxytoca/Raoutella ornithinolytica  Few Enterococcus faecalis  Few Streptococcus agalactiae (Group B) isolated  Group B streptococci are susceptible to ampicillin,  penicillin and cefazolin, but may be resistant to  erythromycin and clindamycin.  Recommendations for intrapartum prophylaxis for Group B  streptococci are penicillin or ampicillin.  --  Aeromonas hydrophila/caviae  Klebsiella oxytoca /Raoutella ornithinolytica  Enterococcus faecalis      Bone R 5th metatarsal bone clean ma  08-20 @ 03:59   No growth at 5 days  --    No polymorphonuclear leukocytes seen per low power field  No organisms seen per oil power field      .Blood Blood-Venous  08-14 @ 21:09   No Growth Final  --  --      .Surgical Swab Right foot plantar wound  08-14 @ 21:08   Moderate Streptococcus agalactiae (Group B) isolated  Group B streptococci are susceptible to ampicillin,  penicillin and cefazolin, but may be resistant to  erythromycin and clindamycin.  Recommendations for intrapartum prophylaxis for Group B  streptococci are penicillin or ampicillin.  Moderate Bacteroides fragilis "Susceptibilities not performed"  Normal skin filiberto isolated  --  --          MEDICATIONS:    MEDICATIONS  (STANDING):  apixaban 5 milliGRAM(s) Oral every 12 hours  aspirin  chewable 81 milliGRAM(s) Oral daily  atorvastatin 80 milliGRAM(s) Oral at bedtime  collagenase Ointment 1 Application(s) Topical daily  cyanocobalamin 1000 MICROGram(s) Oral daily  dextrose 5% + sodium chloride 0.45%. 1000 milliLiter(s) (75 mL/Hr) IV Continuous <Continuous>  dextrose 5%. 1000 milliLiter(s) (100 mL/Hr) IV Continuous <Continuous>  ergocalciferol 23231 Unit(s) Oral <User Schedule>  ferrous    sulfate Liquid 300 milliGRAM(s) Enteral Tube daily  finasteride 5 milliGRAM(s) Oral daily  gabapentin 100 milliGRAM(s) Oral three times a day  glucagon  Injectable 1 milliGRAM(s) IntraMuscular once  insulin lispro (ADMELOG) corrective regimen sliding scale   SubCutaneous Before meals and at bedtime  metoprolol tartrate 25 milliGRAM(s) Oral two times a day  NIFEdipine XL 60 milliGRAM(s) Oral daily  pantoprazole  Injectable 40 milliGRAM(s) IV Push at bedtime  piperacillin/tazobactam IVPB.. 3.375 Gram(s) IV Intermittent every 8 hours      MEDICATIONS  (PRN):  acetaminophen   Tablet .. 650 milliGRAM(s) Oral every 6 hours PRN Temp greater or equal to 38C (100.4F), Moderate Pain (4 - 6)  ondansetron Injectable 4 milliGRAM(s) IV Push three times a day PRN Nausea and/or Vomiting  sodium chloride 0.9% lock flush 10 milliLiter(s) IV Push every 1 hour PRN Pre/post blood products, medications, blood draw, and to maintain line patency      REVIEW OF SYSTEMS:                           ALL ROS DONE [ X   ]    CONSTITUTIONAL:  LETHARGIC [   ], FEVER [   ], UNRESPONSIVE [   ]  CVS:  CP  [   ], SOB, [   ], PALPITATIONS [   ], DIZZYNESS [   ]  RS: COUGH [   ], SPUTUM [   ]  GI: ABDOMINAL PAIN [   ], NAUSEA [   ], VOMITINGS [   ], DIARRHEA [   ], CONSTIPATION [   ]  :  DYSURIA [   ], NOCTURIA [   ], INCREASED FREQUENCY [   ], DRIBLING [   ],  SKELETAL: PAINFUL JOINTS [   ], SWOLLEN JOINTS [   ], NECK ACHE [   ], LOW BACK ACHE [   ],  SKIN : ULCERS [   ], RASH [   ], ITCHING [   ]  CNS: HEAD ACHE [   ], DOUBLE VISION [   ], BLURRED VISION [   ], AMS / CONFUSION [   ], SEIZURES [   ], WEAKNESS [   ],TINGLING / NUMBNESS [   ]      PHYSICAL EXAMINATION:    GENERAL APPEARANCE: NO DISTRESS  HEENT:  NO PALLOR, NO  JVD,  NO   NODES, NECK SUPPLE  CVS: S1 +, S2 +,   RS: AEEB,  OCCASIONAL  RALES +,  RONCHI +  ABD: SOFT, NT, NO, BS +       EXT: NO PE  SKIN: WARM,   RIGHT FOOT WRAPPED  SKELETAL:  ROM ACCEPTABLE  CNS:  AAO X  2-3        RADIOLOGY :    < from: Transthoracic Echocardiogram (10.07.21 @ 15:26) >  CONCLUSIONS:  1. Normal mitral valve. Moderate mitral regurgitation.  2. Calcified aortic valve with decreased opening, cannot  exclude bicuspid. Peak transaortic valve gradient equals  32.4 mm Hg, mean transaortic valve gradient equals 19 mm  Hg, dimensionless index 0.22, estimated aortic valve area  equals 0.6sqcm (by continuity equation), consistent with  paradoxical low-flow low-gradient severe aortic stenosis.  Consider dobutamine stress echocardiogram and/or SABIHA for  further evaluation.  No aortic valve regurgitation seen.  3. Normal aortic root.  4. Normal left atrium.  5. Moderate concentric left ventricular hypertrophy.  6. Moderate global left ventricular systolic dysfunction  (EF 40-45% by visual estimation).  7. Grade II diastolic dysfunction (moderate).  8. Normal right atrium.  9. Normal right ventricular size with decreased RV systolic  function (TAPSE 1.2 cm).  10. RV systolic pressure is borderline normal at  34 mm Hg.  11. Tricuspid valve not well seen. Trace tricuspid  regurgitation.  12. Normal pulmonic valve. Trace pulmonic insufficiency is  noted.  13. No pericardial effusion.  14. Bilateral pleural effusions.    < end of copied text >      EXAM:  CT ABDOMEN AND PELVIS OC                            PROCEDURE DATE:  09/27/2021          INTERPRETATION:  CLINICAL INFORMATION: Small bowel obstruction.    COMPARISON: None.    CONTRAST/COMPLICATIONS:  IV Contrast: NONE  Oral Contrast: Omnipaque 300  Complications: None reported at time of study completion    PROCEDURE:  CT of the Abdomen and Pelvis was performed.  Sagittal and coronal reformats were performed.    FINDINGS:  LOWER CHEST: Small bilateral pleural effusions with compressiveatelectasis of both lower lobes.    LIVER: Within normal limits.  BILE DUCTS: Normal caliber.  GALLBLADDER: Distended. Small layering gallstones.  SPLEEN: Within normal limits.  PANCREAS: Within normal limits.  ADRENALS: Within normal limits.  KIDNEYS/URETERS: No hydronephrosis. Nonobstructing left upper pole calculus, measuring 0.6 cm.    BLADDER: Urinary bladder contains air and a Castañeda catheter balloon.  REPRODUCTIVE ORGANS: Prostate is not enlarged.    BOWEL: No bowel obstruction. Appendix isnormal. Colonic diverticulosis.  PERITONEUM: Mild presacral fluid.  VESSELS: Atherosclerotic changes.  RETROPERITONEUM/LYMPH NODES: No lymphadenopathy.  ABDOMINAL WALL: Within normal limits.  BONES: Degenerative changes. Sternotomy.    IMPRESSION:  No acute intra-abdominal pathology.    Small bilateral pleural effusions with compressive atelectasis of both lower lobes.    ====================================================    EXAM:  MR FOOT RT                            PROCEDURE DATE:  09/17/2021          INTERPRETATION:  Clinical Information: Recent fifth metatarsal head resection now with fifth digit pain, swelling and foul discharge.    Comparison: Radiographs of the right foot from 9/16/2021 and MRI the right foot from 8/16/2021.    Technique:  MRI of the right midfoot and forefoot.  Intravenous Contrast: None.    Findings:    There is a resection at the mid aspect of the fifth metatarsal shaft. There is a lateral soft tissue wound beginning at the level of the amputation which extends distally. There is susceptibility artifact in the region of the amputation consistent with postoperative change. There is hyperintense T2 marrow signal throughout the remaining fifth metatarsal and within the adjacent fourth metatarsal head which is nonspecific and could be related to recent postoperative changes although osteomyelitis is suspected.    There is edema and mild atrophy within the plantar muscles of the foot. There is minimal spurring at the first metatarsophalangeal and hallux sesamoid articulations.    Impression:  Resection at the mid aspect of the fifth metatarsal. Lateral soft tissue wound with marrow signal abnormality throughout the fifth metatarsal and within the adjacent fourth metatarsal head which is nonspecific in the setting of recent surgery although osteomyelitis is suspected.        ASSESSMENT :     Headache    HTN (hypertension)    HLD (hyperlipidemia)    DM (diabetes mellitus)    CAD (coronary artery disease)    Glaucoma, angle-closure    [place] (hard of hearing)      S/P CABG (coronary artery bypass graft)    History of thyroid surgery        PLAN:    HPI:  76M from home, lives with daughter w/ PMH DM on insulin, HTN, HLD, CAD, 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 Pt with sharp pain on the R lateral foot. As per daughter, pt was taking tylenol which did not help his pain prompting the patient to ask his daughter to take him to the hospital. Pt is a poor historian. Daughter states that the patient did not see his podiatrist after the surgery. No fever, chest, pain, palpitations, nausea, vomiting, diarrhea (17 Sep 2021 01:11)    # CASE D/W PATIENT CARE TEAM CHERELLE DIAL TO HELP ASSIST WITH PATIENT AND FAMILY'S NEEDS AT THIS TIME     # PATIENT IS S/P ICU MONITORING AND RIGHT CHEST TUBE REMOVAL ON 10/15/2021     # COVID EXPOSURE ON 10/13 AND ONCE MORE HAD RE-EXPOSURE ON 10/22 - INFECTION CONTROL IS AWARE AND ADDRESSING - ON CONTACT AND DROPLET ISOLATION PRECAUTION; F/U COVID PCR    # SUSPECT RIGHT FOOT OSTEOMYELITIS S/P RIGHT 5TH RAY RESECTION [8/19] AND S/P DEBRIDEMENT, GRAFT APPLICATION, BONE BX AND WOUND VAC APPLICATION 9/22 - BONE BX W/ ACUTE OSTEOMYELITIS AND NECROTIC PERIOSTEAL TISSUE  ** RECENT ADMISSION FOR RIGHT DIABETIC FOOT ULCER, CELLULITIS, OSTEOMYELITIS RIGHT 5th METATARSAL S/P RIGHT 5TH PARTIAL RAY AMPUTATION [8/20] - BONE PATHOLOGY W/ CLEAN MARGINS    - S/P VANCOMYCIN AND ZOSYN ; NOW ON UNASYN AND LEVAQUIN GIVEN OREN; PER ID SWITCH TO ZOSYN UNTIL 11/3   - RECENTLY HAD SIMON W/ MILD PAD  - REVIEWED WOUND CX [ ENTEROCOCCUS, AEROMONAS, KLEBSIELLA] AND BCX  - ID CONSULT, PODIATRY CONSULT    - PICC LINE PLACED TO COMPLETE 6 WEEKS OF ANTIBIOTICS - ON ZOSYN UNTIL 11/3 ON D/C    # ACUTE HYPOXIC RESPIRATORY FAILURE DUE TO ACUTE ON CHRONIC SYSTOLIC CHF  (  LV EF 40- 45 % ) , DIASTOLIC LV DYSFUNCTION , ,  SEVERE AORTIC STENOSIS & MODERATE MITRAL REGURGITATION  &  ASPIRATION PNA;  - S/P CHEST TUBE INSERTION AND REMOVAL  , S/P LASIX ,   CARDIOLOGY CONSULT IN PROGRESS      - CHEST TUBE PLACED [10/8] - FLUID CONSISTENT WITH TRANSUDATIVE PROCESS  - S/P EXTUBATION 10/13  - S/P CHEST TUBE REMOVAL 10/15      # SEPTIC SHOCK - ? S/T HYPOVOLEMIA VS. SEPSIS - S/P CVC [SWITCHED FROM FEMORAL TO LEFT IJ], S/P VASOPRESSORS - RESOLVING  - BCX - NGTD  - ON MEROPENEM      # TRANSIENT NEW ONSET A.FIB, PAROXYSMAL - ON ELIQUIS, CARDIOLOGY CONSULT IN PROGRESS    # FUNGURIA - D/C FLUCONAZOLE GIVEN TRANSAMINITIS    # TRANSAMINITIS - SUSPECT THIS IS ISCHEMIC HEPATITIS - IMPROVING - HEPATOLOGY CONSULT IN PROGRESS    # BOWEL DISTENTION - ? ILEUS - OPTIMIZING ELECTROLYTES - IMPROVED  - SURGERY CONSULT IN PROGRESS    # CHOLELITHIASIS - TRENDING LFTS, RUQ U/S - CHOLELITHIASIS, S/P SURGERY CONSULT   - S/P GI CONSULT - LESS SUSPICIOUS FOR CHOLECYSTITIS    # DYSPEPSIA - PLACED ON PPI BID WITH IMPROVEMENT, UNDERWENT ST EVAL     # ELEVATED TROPONINS - NSTEMI - ? DEMAND - WILL NEED ISCHEMIC EVAL ONCE ACUTE INFECTION RESOLVES PER CARDIOLOGY, CARDIOLOGY CONSULT IN PROGRESS  - ON ASA, STATIN, BB    # OREN ON CKD - S/T ATN AND URINARY RETENTION - S/P IVF, NOW W/ CASTAÑEDA, NEPHROLOGY CONUSLT IN PROGRESS  -  BPH ON FLOMAX, AND FINASTERIDE  - FAILED TOV WITH WORSENING RENAL FXN - NOTED URINARY RETENTION [10/4] - REPLACED CASTAÑEDA  - TOV 10/18 - BLADDER SCAN BID TO EVALUATE FOR URINARY RETENTION - FAILED TOV  - OVERNIGHT 10/18 - CASTAÑEDA REPLACED    # MEDICAL CLEARANCE FOR SURGERY - RCRI - 2 - 10.1 % 30 DAY RISK OF DEATH, MI OR CARDIAC ARREST  - CARDIOLOGY CONSULT     # DM -  HBA1C - 11  - LANTUS, SSI + FS    # CAD S/P CABG - PLACED ON ASA, STATIN, BB  - ECHO - SEVERE AORTIC STENOSIS, SEVERE CONCENTRIC LVH, G1DD, MILD NJ  - CARDIOLOGY CONSULT - DR. BASSETT   - MAY NEED ISCHEMIC EVAL ONCE ACUTE ILLNESS RESOLVES INCLUDING CARDIAC CATHETERIZATION    # HTN, HLD  - ON METOPROLOL, NIFEDIPINE, STATIN       #  IMPAIRED GAIT DUE TO CERVICAL & LS SPONDYLOSIS, POLY ARTHRITIS AND DIABETIC PERIPHERAL NEUROPATHY, OP VITAMIN D DEFICIENCY - ON CHOLECALCIFEROL, OBTAIN PT & OT   #  FAILURE TO THRIVE , MODERATE PROTEIN CALORIE MALNUTRITION       # CASE DISCUSSED AT LENGTH WITH PATIENT AND DAUGHTER, BIRDIE ROBERT VIA PHONE @ [phone] [10/25] - CASE DICUSSED AT LENGTH AND ALL QUESTIONS WERE ANSWERED. DAUGHTER UNDERSTOOD THAT PROGNOSIS IS GUARDED.  # PATIENT AND DAUGHTER REFUSED [facility] ON PREVIOUS ADMISSION, PREVIOUSLY WISHED FOR PATIENT RETURN HOME W/ HOME PT; HOWEVER NOW, DAUGHTER WISHES FOR PATIENT TO GO TO [facility], AS PATIENT'S WIFE IS CURRENTLY ADMITTED THERE    # GI AND DVT PPX

## 2021-10-26 NOTE — PROGRESS NOTE ADULT - SUBJECTIVE AND OBJECTIVE BOX
C A R D I O L O G Y  **********************************    DATE OF SERVICE: 10-26-21    Patient denies chest pain or shortness of breath.   Review of symptoms otherwise negative.    acetaminophen   Tablet .. 650 milliGRAM(s) Oral every 6 hours PRN  apixaban 5 milliGRAM(s) Oral every 12 hours  aspirin  chewable 81 milliGRAM(s) Oral daily  atorvastatin 80 milliGRAM(s) Oral at bedtime  collagenase Ointment 1 Application(s) Topical daily  cyanocobalamin 1000 MICROGram(s) Oral daily  dextrose 5% + sodium chloride 0.45%. 1000 milliLiter(s) IV Continuous <Continuous>  dextrose 5%. 1000 milliLiter(s) IV Continuous <Continuous>  ergocalciferol 83279 Unit(s) Oral <User Schedule>  ferrous    sulfate Liquid 300 milliGRAM(s) Enteral Tube daily  finasteride 5 milliGRAM(s) Oral daily  gabapentin 100 milliGRAM(s) Oral three times a day  glucagon  Injectable 1 milliGRAM(s) IntraMuscular once  insulin lispro (ADMELOG) corrective regimen sliding scale   SubCutaneous Before meals and at bedtime  metoprolol tartrate 25 milliGRAM(s) Oral two times a day  NIFEdipine XL 60 milliGRAM(s) Oral daily  ondansetron Injectable 4 milliGRAM(s) IV Push three times a day PRN  pantoprazole  Injectable 40 milliGRAM(s) IV Push at bedtime  piperacillin/tazobactam IVPB.. 3.375 Gram(s) IV Intermittent every 8 hours  sodium chloride 0.9% lock flush 10 milliLiter(s) IV Push every 1 hour PRN                            9.7    4.30  )-----------( 300      ( 25 Oct 2021 08:37 )             29.8       Hemoglobin: 9.7 g/dL (10-25 @ 08:37)  Hemoglobin: 10.4 g/dL (10-23 @ 06:22)  Hemoglobin: 11.7 g/dL (10-22 @ 07:07)      10-25    142  |  107  |  11  ----------------------------<  118<H>  4.0   |  27  |  1.22    Ca    9.4      25 Oct 2021 08:37    TPro  7.1  /  Alb  2.6<L>  /  TBili  0.7  /  DBili  x   /  AST  27  /  ALT  34  /  AlkPhos  125<H>  10-25    Creatinine Trend: 1.22<--, 1.07<--, 0.84<--, 1.00<--, 0.97<--, 0.97<--    COAGS:           T(C): 36.6 (10-26-21 @ 05:30), Max: 36.6 (10-25-21 @ 19:29)  HR: 83 (10-26-21 @ 05:30) (65 - 83)  BP: 124/60 (10-26-21 @ 05:30) (124/60 - 132/69)  RR: 18 (10-26-21 @ 05:30) (18 - 18)  SpO2: 95% (10-26-21 @ 05:30) (95% - 100%)  Wt(kg): --    I&O's Summary    25 Oct 2021 07:01  -  26 Oct 2021 07:00  --------------------------------------------------------  IN: 0 mL / OUT: 1300 mL / NET: -1300 mL        HEENT:  (-)icterus (-)pallor  CV: N S1 S2 1/6 TRINIDAD (+)2 Pulses B/l  Resp:  Coarse sounds B/L, normal effort  GI: (+) BS Soft, NT, ND  Lymph:  (-)Edema, (-)obvious lymphadenopathy  Skin: Warm to touch, Normal turgor  Psych:  appropriate mood and affect        ASSESSMENT/PLAN: 	76y  Male PMH DM on insulin, HTN, HLD, normal lV fx moderate to severe AS 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    - crt improved  -  complete course of Abx per ID  - Echo noted, Severe AS, EF 40-45%   - He will need a SABIHA and R+L heart cath when he recovers and has no active infection  - Cont medical management of CAD  - Pt. with new onset PAF now on Eliquis  - Pig tail removed on 10/16  - Abx per ID    Zak Wilkins MD, [facility]  BEEPER [phone]

## 2021-10-26 NOTE — PROGRESS NOTE ADULT - SUBJECTIVE AND OBJECTIVE BOX
NP Note discussed with  Primary Attending    Patient is a 76y old  Male who presents with a chief complaint of R Foot Pain (26 Oct 2021 13:45)      INTERVAL HPI/OVERNIGHT EVENTS: Patient seen and examined at bedside, no new complaints    MEDICATIONS  (STANDING):  apixaban 5 milliGRAM(s) Oral every 12 hours  aspirin  chewable 81 milliGRAM(s) Oral daily  atorvastatin 80 milliGRAM(s) Oral at bedtime  collagenase Ointment 1 Application(s) Topical daily  cyanocobalamin 1000 MICROGram(s) Oral daily  dextrose 5% + sodium chloride 0.45%. 1000 milliLiter(s) (75 mL/Hr) IV Continuous <Continuous>  dextrose 5%. 1000 milliLiter(s) (100 mL/Hr) IV Continuous <Continuous>  ergocalciferol 12287 Unit(s) Oral <User Schedule>  ferrous    sulfate Liquid 300 milliGRAM(s) Enteral Tube daily  finasteride 5 milliGRAM(s) Oral daily  gabapentin 100 milliGRAM(s) Oral three times a day  glucagon  Injectable 1 milliGRAM(s) IntraMuscular once  insulin lispro (ADMELOG) corrective regimen sliding scale   SubCutaneous Before meals and at bedtime  metoprolol tartrate 25 milliGRAM(s) Oral two times a day  NIFEdipine XL 60 milliGRAM(s) Oral daily  pantoprazole  Injectable 40 milliGRAM(s) IV Push at bedtime  piperacillin/tazobactam IVPB.. 3.375 Gram(s) IV Intermittent every 8 hours    MEDICATIONS  (PRN):  acetaminophen   Tablet .. 650 milliGRAM(s) Oral every 6 hours PRN Temp greater or equal to 38C (100.4F), Moderate Pain (4 - 6)  ondansetron Injectable 4 milliGRAM(s) IV Push three times a day PRN Nausea and/or Vomiting  sodium chloride 0.9% lock flush 10 milliLiter(s) IV Push every 1 hour PRN Pre/post blood products, medications, blood draw, and to maintain line patency      __________________________________________________  REVIEW OF SYSTEMS:    CONSTITUTIONAL: No fever,   EYES: no acute visual disturbances  NECK: No pain or stiffness  RESPIRATORY: No cough; No shortness of breath  CARDIOVASCULAR: No chest pain, no palpitations  GASTROINTESTINAL: No pain. No nausea or vomiting; No diarrhea   NEUROLOGICAL: No headache or numbness, no tremors  MUSCULOSKELETAL: No joint pain, no muscle pain  GENITOURINARY: no dysuria, no frequency, no hesitancy  PSYCHIATRY: no depression , no anxiety  ALL OTHER  ROS negative        Vital Signs Last 24 Hrs  T(C): 36.7 (26 Oct 2021 13:47), Max: 36.7 (26 Oct 2021 13:47)  T(F): 98 (26 Oct 2021 13:47), Max: 98 (26 Oct 2021 13:47)  HR: 75 (26 Oct 2021 13:47) (65 - 83)  BP: 128/64 (26 Oct 2021 13:47) (124/60 - 132/69)  BP(mean): --  RR: 16 (26 Oct 2021 13:47) (16 - 18)  SpO2: 100% (26 Oct 2021 13:47) (95% - 100%)    ________________________________________________  PHYSICAL EXAM:  GENERAL: NAD  HEENT: Normocephalic;  conjunctivae and sclerae clear; moist mucous membranes;   NECK : supple  CHEST/LUNG: Clear to auscultation bilaterally with good air entry   HEART: S1 S2  regular; no murmurs, gallops or rubs  ABDOMEN: Soft, Nontender, Nondistended; Bowel sounds present  EXTREMITIES: right foot ace bandage, no cyanosis; no edema; no calf tenderness  SKIN: right foot ulceration, perianal wound  NERVOUS SYSTEM:  Awake and alert; Oriented  to person    _________________________________________________  LABS:                        9.7    4.30  )-----------( 300      ( 25 Oct 2021 08:37 )             29.8     10-25    142  |  107  |  11  ----------------------------<  118<H>  4.0   |  27  |  1.22    Ca    9.4      25 Oct 2021 08:37    TPro  7.1  /  Alb  2.6<L>  /  TBili  0.7  /  DBili  x   /  AST  27  /  ALT  34  /  AlkPhos  125<H>  10-25        CAPILLARY BLOOD GLUCOSE      POCT Blood Glucose.: 136 mg/dL (26 Oct 2021 11:34)  POCT Blood Glucose.: 143 mg/dL (26 Oct 2021 08:05)  POCT Blood Glucose.: 136 mg/dL (25 Oct 2021 21:29)  POCT Blood Glucose.: 172 mg/dL (25 Oct 2021 16:10)      RADIOLOGY & ADDITIONAL TESTS:  < from: Xray Abdomen 1 View PORTABLE -Urgent (Xray Abdomen 1 View PORTABLE -Urgent .) (10.16.21 @ 20:25) >    EXAM:  XR ABDOMEN PORTABLE URGENT 1V                            PROCEDURE DATE:  10/16/2021          INTERPRETATION:  Suspect ileus.    AP portable supine. Prior 10/6/2021.    IMPRESSION:  Nonspecific bowel gas pattern. No definite obstruction. No opaque urinary biliary calculi. Vascular calcification. Advanced degenerative change thoracolumbar spine.      < end of copied text >    Imaging  Reviewed:  YES    Consultant(s) Notes Reviewed:   YES      Plan of care was discussed with patient and /or primary care giver; all questions and concerns were addressed

## 2021-10-26 NOTE — PROGRESS NOTE ADULT - ASSESSMENT
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  As per daughter, patient was taking tylenol which did not help his pain prompting the patient to ask his daughter to take him to the hospital. ON admission, he has no fever or Leukocytosis. The Xray of Right foot shows no Osteomyelitis but MRI of Right foot shows Lateral soft tissue wound with marrow signal abnormality throughout the fifth metatarsal which is consistent of Osteomyelitis. He has seen by Podiatry and wound culture has sent, Zosyn and Vancomycin has started. The ID consult requested to assist with further evaluation and antibiotic management.     # Right Fifth metatarsal DFU with drainage and Osteomyelitis- wound cx grew Enterococcus, Aeromonas and Klebsiella - Zosyn is the ideal to cover All organisms but kidney function is worsening, hence change to Unasyn and Levaquin until kidney function is improved - The pathology shows Bone with acute osteomyelitis and necrotic periosteal tissue.   # OREN- s/p urinary retention -s/p ibrahim catheter - s/p discontinue ACEI  # RLL pneumonia- most likely Aspiration, S/p Vomiting - On Unasyn  # Large bowel distension  # Candiduria- 9/22/21  # Pulmonary edema with bilateral moderate to large pleural effusions- on CT chest, 10/5/21- s/p intubation 10/7- s/p Right sided chest tube placement by CT team, 10/8/21  # COVID Exposure - PCR negative as of  10/11/21, 10/21/21    would recommend:    1. Please  Monitor kidney function closely while on Zosyn   2. Aspiration precaution    3.. Wound care as per Podiatry  4. Continue Zosyn until 11/3/21  5. OOB to chair /PT    Attending Attestation:    Spent more than 35 minutes on total encounter, more than 50 % of the visit was spent counseling and/or coordinating care by the Attending physician. 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  As per daughter, patient was taking tylenol which did not help his pain prompting the patient to ask his daughter to take him to the hospital. ON admission, he has no fever or Leukocytosis. The Xray of Right foot shows no Osteomyelitis but MRI of Right foot shows Lateral soft tissue wound with marrow signal abnormality throughout the fifth metatarsal which is consistent of Osteomyelitis. He has seen by Podiatry and wound culture has sent, Zosyn and Vancomycin has started. The ID consult requested to assist with further evaluation and antibiotic management.     # Right Fifth metatarsal DFU with drainage and Osteomyelitis- wound cx grew Enterococcus, Aeromonas and Klebsiella - Zosyn is the ideal to cover All organisms but kidney function is worsening, hence change to Unasyn and Levaquin until kidney function is improved - The pathology shows Bone with acute osteomyelitis and necrotic periosteal tissue.   # OREN- s/p urinary retention -s/p ibrahim catheter - s/p discontinue ACEI  # RLL pneumonia- most likely Aspiration, S/p Vomiting - On Unasyn  # Large bowel distension  # Candiduria- 9/22/21  # Pulmonary edema with bilateral moderate to large pleural effusions- on CT chest, 10/5/21- s/p intubation 10/7- s/p Right sided chest tube placement by CT team, 10/8/21  # COVID Exposure - PCR negative as of  10/11/21, 10/21/21 and re-exposed and in [place] until 11/1/21    would recommend:    1. Please  Monitor kidney function closely while on Zosyn   2. Aspiration precaution    3.. Wound care as per Podiatry  4. Continue Zosyn until 11/3/21  5. OOB to chair /PT    d/w Covering Marzena GOOD    Attending Attestation:    Spent more than 35 minutes on total encounter, more than 50 % of the visit was spent counseling and/or coordinating care by the Attending physician.

## 2021-10-27 LAB
ANION GAP SERPL CALC-SCNC: 7 MMOL/L — SIGNIFICANT CHANGE UP (ref 5–17)
BUN SERPL-MCNC: 8 MG/DL — SIGNIFICANT CHANGE UP (ref 7–18)
CALCIUM SERPL-MCNC: 8.9 MG/DL — SIGNIFICANT CHANGE UP (ref 8.4–10.5)
CHLORIDE SERPL-SCNC: 109 MMOL/L — HIGH (ref 96–108)
CO2 SERPL-SCNC: 24 MMOL/L — SIGNIFICANT CHANGE UP (ref 22–31)
CREAT SERPL-MCNC: 1.25 MG/DL — SIGNIFICANT CHANGE UP (ref 0.5–1.3)
GLUCOSE BLDC GLUCOMTR-MCNC: 114 MG/DL — HIGH (ref 70–99)
GLUCOSE BLDC GLUCOMTR-MCNC: 118 MG/DL — HIGH (ref 70–99)
GLUCOSE BLDC GLUCOMTR-MCNC: 130 MG/DL — HIGH (ref 70–99)
GLUCOSE BLDC GLUCOMTR-MCNC: 150 MG/DL — HIGH (ref 70–99)
GLUCOSE SERPL-MCNC: 117 MG/DL — HIGH (ref 70–99)
HCT VFR BLD CALC: 28 % — LOW (ref 39–50)
HGB BLD-MCNC: 9.1 G/DL — LOW (ref 13–17)
MCHC RBC-ENTMCNC: 29.4 PG — SIGNIFICANT CHANGE UP (ref 27–34)
MCHC RBC-ENTMCNC: 32.5 GM/DL — SIGNIFICANT CHANGE UP (ref 32–36)
MCV RBC AUTO: 90.6 FL — SIGNIFICANT CHANGE UP (ref 80–100)
NRBC # BLD: 0 /100 WBCS — SIGNIFICANT CHANGE UP (ref 0–0)
PLATELET # BLD AUTO: 273 K/UL — SIGNIFICANT CHANGE UP (ref 150–400)
POTASSIUM SERPL-MCNC: 4.2 MMOL/L — SIGNIFICANT CHANGE UP (ref 3.5–5.3)
POTASSIUM SERPL-SCNC: 4.2 MMOL/L — SIGNIFICANT CHANGE UP (ref 3.5–5.3)
RBC # BLD: 3.09 M/UL — LOW (ref 4.2–5.8)
RBC # FLD: 14.6 % — HIGH (ref 10.3–14.5)
SODIUM SERPL-SCNC: 140 MMOL/L — SIGNIFICANT CHANGE UP (ref 135–145)
WBC # BLD: 4.88 K/UL — SIGNIFICANT CHANGE UP (ref 3.8–10.5)
WBC # FLD AUTO: 4.88 K/UL — SIGNIFICANT CHANGE UP (ref 3.8–10.5)

## 2021-10-27 RX ORDER — CHLORHEXIDINE GLUCONATE 213 G/1000ML
1 SOLUTION TOPICAL
Refills: 0 | Status: DISCONTINUED | OUTPATIENT
Start: 2021-10-27 | End: 2021-11-03

## 2021-10-27 RX ADMIN — PIPERACILLIN AND TAZOBACTAM 25 GRAM(S): 4; .5 INJECTION, POWDER, LYOPHILIZED, FOR SOLUTION INTRAVENOUS at 21:57

## 2021-10-27 RX ADMIN — APIXABAN 5 MILLIGRAM(S): 2.5 TABLET, FILM COATED ORAL at 17:30

## 2021-10-27 RX ADMIN — FINASTERIDE 5 MILLIGRAM(S): 5 TABLET, FILM COATED ORAL at 11:49

## 2021-10-27 RX ADMIN — CHLORHEXIDINE GLUCONATE 1 APPLICATION(S): 213 SOLUTION TOPICAL at 11:50

## 2021-10-27 RX ADMIN — APIXABAN 5 MILLIGRAM(S): 2.5 TABLET, FILM COATED ORAL at 05:27

## 2021-10-27 RX ADMIN — PIPERACILLIN AND TAZOBACTAM 25 GRAM(S): 4; .5 INJECTION, POWDER, LYOPHILIZED, FOR SOLUTION INTRAVENOUS at 05:27

## 2021-10-27 RX ADMIN — Medication 60 MILLIGRAM(S): at 05:27

## 2021-10-27 RX ADMIN — Medication 25 MILLIGRAM(S): at 17:30

## 2021-10-27 RX ADMIN — PIPERACILLIN AND TAZOBACTAM 25 GRAM(S): 4; .5 INJECTION, POWDER, LYOPHILIZED, FOR SOLUTION INTRAVENOUS at 13:28

## 2021-10-27 RX ADMIN — GABAPENTIN 100 MILLIGRAM(S): 400 CAPSULE ORAL at 05:27

## 2021-10-27 RX ADMIN — Medication 300 MILLIGRAM(S): at 11:50

## 2021-10-27 RX ADMIN — GABAPENTIN 100 MILLIGRAM(S): 400 CAPSULE ORAL at 21:57

## 2021-10-27 RX ADMIN — PANTOPRAZOLE SODIUM 40 MILLIGRAM(S): 20 TABLET, DELAYED RELEASE ORAL at 21:57

## 2021-10-27 RX ADMIN — Medication 1 APPLICATION(S): at 11:50

## 2021-10-27 RX ADMIN — GABAPENTIN 100 MILLIGRAM(S): 400 CAPSULE ORAL at 13:27

## 2021-10-27 RX ADMIN — ATORVASTATIN CALCIUM 80 MILLIGRAM(S): 80 TABLET, FILM COATED ORAL at 21:57

## 2021-10-27 RX ADMIN — Medication 25 MILLIGRAM(S): at 05:27

## 2021-10-27 RX ADMIN — Medication 81 MILLIGRAM(S): at 11:49

## 2021-10-27 RX ADMIN — PREGABALIN 1000 MICROGRAM(S): 225 CAPSULE ORAL at 11:49

## 2021-10-27 NOTE — PROGRESS NOTE ADULT - SUBJECTIVE AND OBJECTIVE BOX
Patient is seen and examined at the bed side, is afebrile.  The creatinine is stable now.       REVIEW OF SYSTEMS: All other review systems are negative      ALLERGIES: No Known Allergies      Vital Signs Last 24 Hrs  T(C): 36.8 (27 Oct 2021 13:06), Max: 36.8 (27 Oct 2021 13:06)  T(F): 98.3 (27 Oct 2021 13:06), Max: 98.3 (27 Oct 2021 13:06)  HR: 86 (27 Oct 2021 13:06) (71 - 86)  BP: 121/65 (27 Oct 2021 13:06) (119/58 - 121/65)  BP(mean): --  RR: 16 (27 Oct 2021 13:06) (16 - 17)  SpO2: 100% (27 Oct 2021 13:06) (100% - 100%)      PHYSICAL EXAM:  GENERAL: Not in distress  CHEST/LUNG:  Not using accessory muscles, s/p removal of Right CT   HEART: s1 and s2 present  ABDOMEN:  Mild distended   EXTREMITIES: Right foot bandage in placed   CNS: Awake and alert       LABS:                           9.1    4.88  )-----------( 273      ( 27 Oct 2021 06:23 )             28.0                         9.7    4.30  )-----------( 300      ( 25 Oct 2021 08:37 )             29.8         10-27    140  |  109<H>  |  8   ----------------------------<  117<H>  4.2   |  24  |  1.25    Ca    8.9      27 Oct 2021 06:23      10-25    142  |  107  |  11  ----------------------------<  118<H>  4.0   |  27  |  1.22    Ca    9.4      25 Oct 2021 08:37    TPro  7.1  /  Alb  2.6<L>  /  TBili  0.7  /  DBili  x   /  AST  27  /  ALT  34  /  AlkPhos  125<H>  10-25    10-22    140  |  105  |  12  ----------------------------<  101<H>  4.3   |  27  |  1.07    Ca    9.7      22 Oct 2021 07:07  Phos  3.5     10-22  Mg     2.3     10-22    TPro  6.8  /  Alb  2.4<L>  /  TBili  0.7  /  DBili  x   /  AST  31  /  ALT  50  /  AlkPhos  130<H>  10-21      09-25    139  |  107  |  41<H>  ----------------------------<  134<H>  4.1   |  21<L>  |  4.05<H>    Ca    8.6      25 Sep 2021 06:40    TPro  6.6  /  Alb  2.3<L>  /  TBili  0.5  /  DBili  x   /  AST  25  /  ALT  15  /  AlkPhos  102  09-25        CAPILLARY BLOOD GLUCOSE  POCT Blood Glucose.: 134 mg/dL (17 Sep 2021 17:11)  POCT Blood Glucose.: 128 mg/dL (17 Sep 2021 11:06)  POCT Blood Glucose.: 157 mg/dL (17 Sep 2021 04:10)      Vancomycin Level, Trough (10.14.21 @ 11:32) : 21.8  Vancomycin Level, Trough (10.12.21 @ 12:13) : 19.5:      MEDICATIONS  (STANDING):    apixaban 5 milliGRAM(s) Oral every 12 hours  aspirin  chewable 81 milliGRAM(s) Oral daily  atorvastatin 80 milliGRAM(s) Oral at bedtime  chlorhexidine 2% Cloths 1 Application(s) Topical <User Schedule>  collagenase Ointment 1 Application(s) Topical daily  cyanocobalamin 1000 MICROGram(s) Oral daily  dextrose 5%. 1000 milliLiter(s) (100 mL/Hr) IV Continuous <Continuous>  ergocalciferol 86358 Unit(s) Oral <User Schedule>  ferrous    sulfate Liquid 300 milliGRAM(s) Enteral Tube daily  finasteride 5 milliGRAM(s) Oral daily  gabapentin 100 milliGRAM(s) Oral three times a day  glucagon  Injectable 1 milliGRAM(s) IntraMuscular once  insulin lispro (ADMELOG) corrective regimen sliding scale   SubCutaneous Before meals and at bedtime  metoprolol tartrate 25 milliGRAM(s) Oral two times a day  NIFEdipine XL 60 milliGRAM(s) Oral daily  pantoprazole  Injectable 40 milliGRAM(s) IV Push at bedtime  piperacillin/tazobactam IVPB.. 3.375 Gram(s) IV Intermittent every 8 hours        RADIOLOGY & ADDITIONAL TESTS:    10/5/21 CT Chest No Cont (10.05.21 @ 14:07) Pulmonary edema with bilateral moderate to large pleural effusions. Underlying bilateral lower lobe compressive atelectasis versus pneumonia.  Mildly enlarged mediastinal lymph nodes, possibly reactive.      10/2/21: Xray Chest 1 View- PORTABLE-Routine (Xray Chest 1 View- PORTABLE-Routine in AM.) (10.02.21 @ 09:46) There is persistent bilateral perihilar/basilar diffuse airspace disease and/or RIGHT effusion.  Cardiomegaly.  No interval change.    Collected Date/Time: 9/22/2021 08:42 EDT   Received Date/Time: 9/23/2021 09:29 EDT   Surgical Pathology Report - Auth (Verified)   Specimen(s) Submitted   1 Right 5th Toe Wound Debridement r/o Osteomyelitis   Final Diagnosis   Right fifth toe; wound debridement:   - Bone with acute osteomyelitis and necrotic periosteal tissue.     9/28/21: US Abdomen Upper Quadrant Right (09.28.21 @ 12:13) Cholelithiasis without evidence of acute cholecystitis. Note is made of right pleural effusion    9/27/21: CT Abdomen and Pelvis w/ Oral Cont (09.27.21 @ 15:53) >No acute intra-abdominal pathology.    Small bilateral pleural effusions with compressive atelectasis of both lower lobes.    9/26/21: Xray Chest 1 View-PORTABLE IMMEDIATE (Xray Chest 1 View-PORTABLE IMMEDIATE .) (09.26.21 @ 15:28) : Small bilateral pleural effusions and mild pulmonary edema. A right lower lobe pneumonia is not excluded.      9/26/21: Xray Abdomen 1 View Portable, IMMEDIATE (Xray Abdomen 1 View Portable, IMMEDIATE .) (09.26.21 @ 15:28) : There is a nasogastric tube with its tip in the stomach. There is gaseous distention of the colon. No pathologic calcifications are seen. The osseous structures are intact with degenerative change is present in the spine.      9/17/21 : MR Foot No Cont, Right (09.17.21 @ 13:04) : Resection at the mid aspect of the fifth metatarsal. Lateral soft tissue wound with marrow signal abnormality throughout the fifth metatarsal and within the adjacent fourth metatarsal head which is nonspecific in the setting of recent surgery although osteomyelitis is suspected.    9/16/21 : Xray Foot AP + Lateral + Oblique, Right (09.16.21 @ 15:03) : No acute finding. No plain film evidence of osteomyelitis.        MICROBIOLOGY DATA:    COVID-19 PCR (10.11.21 @ 05:44)   COVID-19 PCR: NotDetec:   Urine Microscopic-Add On (NC) (09.22.21 @ 16:14)   Red Blood Cell - Urine: 0-2 /HPF   White Blood Cell - Urine: 3-5 /HPF   Bacteria: Moderate /HPF   Comment - Urine: yeast cells present   Epithelial Cells: Moderate /HPF     Culture - Tissue with Gram Stain (09.22.21 @ 13:54)   Gram Stain: No polymorphonuclear cells seen per low power field   No organisms seen per oil power field   Specimen Source: .Tissue Right 5th toe r/o osteomyeltis   Culture Results: No growth     COVID-19 David Domain Antibody (09.18.21 @ 12:55)   COVID-19 David Domain Antibody Result: >250.00:    Culture - Blood (09.17.21 @ 10:28)   Specimen Source: .Blood Blood-Peripheral   Culture Results: No growth to date.     Culture - Blood (09.17.21 @ 10:28)   Specimen Source: .Blood Blood-Venous   Culture Results: No growth to date.     Culture - Surgical Swab (09.16.21 @ 21:42)   - Amikacin: S <=16   - Amikacin: S <=16   - Amoxicillin/Clavulanic Acid: S <=8/4   - Ampicillin: R >16 These ampicillin results predict results for amoxicillin   - Ampicillin: S <=2 Predicts results to ampicillin/sulbactam, amoxacillin-clavulanate and piperacillin-tazobactam.   - Ampicillin/Sulbactam: S 8/4 Enterobacter, Citrobacter, and Serratia may develop resistance during prolonged therapy (3-4 days)   - Ampicillin/Sulbactam: R >16/8   - Aztreonam: S <=4   - Aztreonam: S <=4   - Cefazolin: R 16 Enterobacter, Citrobacter, and Serratia may develop resistance during prolonged therapy (3-4 days)   - Cefazolin: R >16   - Cefepime: S <=2   - Cefepime: S <=2   - Cefoxitin: S <=8   - Cefoxitin: S <=8   - Ceftazidime: S <=1   - Ceftriaxone: S <=1   - Ceftriaxone: S <=1 Enterobacter, Citrobacter, and Serratia may develop resistance during prolonged therapy   - Ciprofloxacin: S <=0.25   - Ciprofloxacin: S <=0.25   - Ertapenem: S <=0.5   - Gentamicin: S <=2   - Gentamicin: S <=2   - Imipenem: S <=1   - Levofloxacin: S <=0.5   - Levofloxacin: S <=0.5   - Meropenem: S <=1   - Meropenem: S <=1   - Piperacillin/Tazobactam: S <=8   - Piperacillin/Tazobactam: S <=8   - Tetra/Doxy: S 4   - Tobramycin: S <=2   - Trimethoprim/Sulfamethoxazole: S <=0.5/9.5   - Trimethoprim/Sulfamethoxazole: S <=0.5/9.5   - Vancomycin: S 2   Specimen Source: .Surgical Swab right foot wound   Culture Results:   Culture yields >4 types of aerobic and/or anaerobic bacteria   Call client services within 7 days if further workup is clinically   indicated. Culture includes   Rare Aeromonas hydrophila/caviae   Few Klebsiella oxytoca/Raoutella ornithinolytica   Few Enterococcus faecalis   Few Streptococcus agalactiae (Group B) isolated   Group B streptococci are susceptible to ampicillin,   penicillin and cefazolin, but may be resistant to   erythromycin and clindamycin.   Recommendations for intrapartum prophylaxis for Group B   streptococci are penicillin or ampicillin.   Organism Identification: Aeromonas hydrophila/caviae   Klebsiella oxytoca /Raoutella ornithinolytica   Enterococcus faecalis   Organism: Aeromonas hydrophila/caviae   Organism: Klebsiella oxytoca /Raoutella ornithinolytica   Organism: Enterococcus faecalis   COVID-19 PCR . (09.16.21 @ 22:24)   COVID-19 PCR: NotDetec:           Patient is seen and examined at the bed side, is afebrile.  The creatinine is stable, not worsening.      REVIEW OF SYSTEMS: All other review systems are negative      ALLERGIES: No Known Allergies      Vital Signs Last 24 Hrs  T(C): 36.8 (27 Oct 2021 13:06), Max: 36.8 (27 Oct 2021 13:06)  T(F): 98.3 (27 Oct 2021 13:06), Max: 98.3 (27 Oct 2021 13:06)  HR: 86 (27 Oct 2021 13:06) (71 - 86)  BP: 121/65 (27 Oct 2021 13:06) (119/58 - 121/65)  BP(mean): --  RR: 16 (27 Oct 2021 13:06) (16 - 17)  SpO2: 100% (27 Oct 2021 13:06) (100% - 100%)      PHYSICAL EXAM:  GENERAL: Not in distress  CHEST/LUNG:  Not using accessory muscles, s/p removal of Right CT   HEART: s1 and s2 present  ABDOMEN:  Mild distended   EXTREMITIES: Right foot bandage in placed   CNS: Awake and alert       LABS:                           9.1    4.88  )-----------( 273      ( 27 Oct 2021 06:23 )             28.0                         9.7    4.30  )-----------( 300      ( 25 Oct 2021 08:37 )             29.8         10-27    140  |  109<H>  |  8   ----------------------------<  117<H>  4.2   |  24  |  1.25    Ca    8.9      27 Oct 2021 06:23      10-25    142  |  107  |  11  ----------------------------<  118<H>  4.0   |  27  |  1.22    Ca    9.4      25 Oct 2021 08:37    TPro  7.1  /  Alb  2.6<L>  /  TBili  0.7  /  DBili  x   /  AST  27  /  ALT  34  /  AlkPhos  125<H>  10-25    10-22    140  |  105  |  12  ----------------------------<  101<H>  4.3   |  27  |  1.07    Ca    9.7      22 Oct 2021 07:07  Phos  3.5     10-22  Mg     2.3     10-22    TPro  6.8  /  Alb  2.4<L>  /  TBili  0.7  /  DBili  x   /  AST  31  /  ALT  50  /  AlkPhos  130<H>  10-21      09-25    139  |  107  |  41<H>  ----------------------------<  134<H>  4.1   |  21<L>  |  4.05<H>    Ca    8.6      25 Sep 2021 06:40    TPro  6.6  /  Alb  2.3<L>  /  TBili  0.5  /  DBili  x   /  AST  25  /  ALT  15  /  AlkPhos  102  09-25        CAPILLARY BLOOD GLUCOSE  POCT Blood Glucose.: 134 mg/dL (17 Sep 2021 17:11)  POCT Blood Glucose.: 128 mg/dL (17 Sep 2021 11:06)  POCT Blood Glucose.: 157 mg/dL (17 Sep 2021 04:10)      Vancomycin Level, Trough (10.14.21 @ 11:32) : 21.8  Vancomycin Level, Trough (10.12.21 @ 12:13) : 19.5:      MEDICATIONS  (STANDING):    apixaban 5 milliGRAM(s) Oral every 12 hours  aspirin  chewable 81 milliGRAM(s) Oral daily  atorvastatin 80 milliGRAM(s) Oral at bedtime  chlorhexidine 2% Cloths 1 Application(s) Topical <User Schedule>  collagenase Ointment 1 Application(s) Topical daily  cyanocobalamin 1000 MICROGram(s) Oral daily  dextrose 5%. 1000 milliLiter(s) (100 mL/Hr) IV Continuous <Continuous>  ergocalciferol 25033 Unit(s) Oral <User Schedule>  ferrous    sulfate Liquid 300 milliGRAM(s) Enteral Tube daily  finasteride 5 milliGRAM(s) Oral daily  gabapentin 100 milliGRAM(s) Oral three times a day  glucagon  Injectable 1 milliGRAM(s) IntraMuscular once  insulin lispro (ADMELOG) corrective regimen sliding scale   SubCutaneous Before meals and at bedtime  metoprolol tartrate 25 milliGRAM(s) Oral two times a day  NIFEdipine XL 60 milliGRAM(s) Oral daily  pantoprazole  Injectable 40 milliGRAM(s) IV Push at bedtime  piperacillin/tazobactam IVPB.. 3.375 Gram(s) IV Intermittent every 8 hours        RADIOLOGY & ADDITIONAL TESTS:    10/5/21 CT Chest No Cont (10.05.21 @ 14:07) Pulmonary edema with bilateral moderate to large pleural effusions. Underlying bilateral lower lobe compressive atelectasis versus pneumonia.  Mildly enlarged mediastinal lymph nodes, possibly reactive.      10/2/21: Xray Chest 1 View- PORTABLE-Routine (Xray Chest 1 View- PORTABLE-Routine in AM.) (10.02.21 @ 09:46) There is persistent bilateral perihilar/basilar diffuse airspace disease and/or RIGHT effusion.  Cardiomegaly.  No interval change.    Collected Date/Time: 9/22/2021 08:42 EDT   Received Date/Time: 9/23/2021 09:29 EDT   Surgical Pathology Report - Auth (Verified)   Specimen(s) Submitted   1 Right 5th Toe Wound Debridement r/o Osteomyelitis   Final Diagnosis   Right fifth toe; wound debridement:   - Bone with acute osteomyelitis and necrotic periosteal tissue.     9/28/21: US Abdomen Upper Quadrant Right (09.28.21 @ 12:13) Cholelithiasis without evidence of acute cholecystitis. Note is made of right pleural effusion    9/27/21: CT Abdomen and Pelvis w/ Oral Cont (09.27.21 @ 15:53) >No acute intra-abdominal pathology.    Small bilateral pleural effusions with compressive atelectasis of both lower lobes.    9/26/21: Xray Chest 1 View-PORTABLE IMMEDIATE (Xray Chest 1 View-PORTABLE IMMEDIATE .) (09.26.21 @ 15:28) : Small bilateral pleural effusions and mild pulmonary edema. A right lower lobe pneumonia is not excluded.      9/26/21: Xray Abdomen 1 View Portable, IMMEDIATE (Xray Abdomen 1 View Portable, IMMEDIATE .) (09.26.21 @ 15:28) : There is a nasogastric tube with its tip in the stomach. There is gaseous distention of the colon. No pathologic calcifications are seen. The osseous structures are intact with degenerative change is present in the spine.      9/17/21 : MR Foot No Cont, Right (09.17.21 @ 13:04) : Resection at the mid aspect of the fifth metatarsal. Lateral soft tissue wound with marrow signal abnormality throughout the fifth metatarsal and within the adjacent fourth metatarsal head which is nonspecific in the setting of recent surgery although osteomyelitis is suspected.    9/16/21 : Xray Foot AP + Lateral + Oblique, Right (09.16.21 @ 15:03) : No acute finding. No plain film evidence of osteomyelitis.        MICROBIOLOGY DATA:    COVID-19 PCR (10.11.21 @ 05:44)   COVID-19 PCR: NotDetec:   Urine Microscopic-Add On (NC) (09.22.21 @ 16:14)   Red Blood Cell - Urine: 0-2 /HPF   White Blood Cell - Urine: 3-5 /HPF   Bacteria: Moderate /HPF   Comment - Urine: yeast cells present   Epithelial Cells: Moderate /HPF     Culture - Tissue with Gram Stain (09.22.21 @ 13:54)   Gram Stain: No polymorphonuclear cells seen per low power field   No organisms seen per oil power field   Specimen Source: .Tissue Right 5th toe r/o osteomyeltis   Culture Results: No growth     COVID-19 David Domain Antibody (09.18.21 @ 12:55)   COVID-19 David Domain Antibody Result: >250.00:    Culture - Blood (09.17.21 @ 10:28)   Specimen Source: .Blood Blood-Peripheral   Culture Results: No growth to date.     Culture - Blood (09.17.21 @ 10:28)   Specimen Source: .Blood Blood-Venous   Culture Results: No growth to date.     Culture - Surgical Swab (09.16.21 @ 21:42)   - Amikacin: S <=16   - Amikacin: S <=16   - Amoxicillin/Clavulanic Acid: S <=8/4   - Ampicillin: R >16 These ampicillin results predict results for amoxicillin   - Ampicillin: S <=2 Predicts results to ampicillin/sulbactam, amoxacillin-clavulanate and piperacillin-tazobactam.   - Ampicillin/Sulbactam: S 8/4 Enterobacter, Citrobacter, and Serratia may develop resistance during prolonged therapy (3-4 days)   - Ampicillin/Sulbactam: R >16/8   - Aztreonam: S <=4   - Aztreonam: S <=4   - Cefazolin: R 16 Enterobacter, Citrobacter, and Serratia may develop resistance during prolonged therapy (3-4 days)   - Cefazolin: R >16   - Cefepime: S <=2   - Cefepime: S <=2   - Cefoxitin: S <=8   - Cefoxitin: S <=8   - Ceftazidime: S <=1   - Ceftriaxone: S <=1   - Ceftriaxone: S <=1 Enterobacter, Citrobacter, and Serratia may develop resistance during prolonged therapy   - Ciprofloxacin: S <=0.25   - Ciprofloxacin: S <=0.25   - Ertapenem: S <=0.5   - Gentamicin: S <=2   - Gentamicin: S <=2   - Imipenem: S <=1   - Levofloxacin: S <=0.5   - Levofloxacin: S <=0.5   - Meropenem: S <=1   - Meropenem: S <=1   - Piperacillin/Tazobactam: S <=8   - Piperacillin/Tazobactam: S <=8   - Tetra/Doxy: S 4   - Tobramycin: S <=2   - Trimethoprim/Sulfamethoxazole: S <=0.5/9.5   - Trimethoprim/Sulfamethoxazole: S <=0.5/9.5   - Vancomycin: S 2   Specimen Source: .Surgical Swab right foot wound   Culture Results:   Culture yields >4 types of aerobic and/or anaerobic bacteria   Call client services within 7 days if further workup is clinically   indicated. Culture includes   Rare Aeromonas hydrophila/caviae   Few Klebsiella oxytoca/Raoutella ornithinolytica   Few Enterococcus faecalis   Few Streptococcus agalactiae (Group B) isolated   Group B streptococci are susceptible to ampicillin,   penicillin and cefazolin, but may be resistant to   erythromycin and clindamycin.   Recommendations for intrapartum prophylaxis for Group B   streptococci are penicillin or ampicillin.   Organism Identification: Aeromonas hydrophila/caviae   Klebsiella oxytoca /Raoutella ornithinolytica   Enterococcus faecalis   Organism: Aeromonas hydrophila/caviae   Organism: Klebsiella oxytoca /Raoutella ornithinolytica   Organism: Enterococcus faecalis   COVID-19 PCR . (09.16.21 @ 22:24)   COVID-19 PCR: NotDetec:

## 2021-10-27 NOTE — PROGRESS NOTE ADULT - ASSESSMENT
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  As per daughter, patient was taking tylenol which did not help his pain prompting the patient to ask his daughter to take him to the hospital. ON admission, he has no fever or Leukocytosis. The Xray of Right foot shows no Osteomyelitis but MRI of Right foot shows Lateral soft tissue wound with marrow signal abnormality throughout the fifth metatarsal which is consistent of Osteomyelitis. He has seen by Podiatry and wound culture has sent, Zosyn and Vancomycin has started. The ID consult requested to assist with further evaluation and antibiotic management.     # Right Fifth metatarsal DFU with drainage and Osteomyelitis- wound cx grew Enterococcus, Aeromonas and Klebsiella - Zosyn is the ideal to cover All organisms but kidney function is worsening, hence change to Unasyn and Levaquin until kidney function is improved - The pathology shows Bone with acute osteomyelitis and necrotic periosteal tissue.   # OREN- s/p urinary retention -s/p ibrahim catheter - s/p discontinue ACEI  # RLL pneumonia- most likely Aspiration, S/p Vomiting - On Unasyn  # Large bowel distension  # Candiduria- 9/22/21  # Pulmonary edema with bilateral moderate to large pleural effusions- on CT chest, 10/5/21- s/p intubation 10/7- s/p Right sided chest tube placement by CT team, 10/8/21  # COVID Exposure - PCR negative as of  10/11/21, 10/21/21 and re-exposed and in Quarintine until 11/1/21    would recommend:    1. Monitor kidney function closely while on Zosyn   2. Aspiration precaution    3.. Wound care as per Podiatry  4. Continue Zosyn until 11/3/21  5. OOB to chair /PT    Attending Attestation:    Spent more than 35 minutes on total encounter, more than 50 % of the visit was spent counseling and/or coordinating care by the Attending physician.       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  As per daughter, patient was taking tylenol which did not help his pain prompting the patient to ask his daughter to take him to the hospital. ON admission, he has no fever or Leukocytosis. The Xray of Right foot shows no Osteomyelitis but MRI of Right foot shows Lateral soft tissue wound with marrow signal abnormality throughout the fifth metatarsal which is consistent of Osteomyelitis. He has seen by Podiatry and wound culture has sent, Zosyn and Vancomycin has started. The ID consult requested to assist with further evaluation and antibiotic management.     # Right Fifth metatarsal DFU with drainage and Osteomyelitis- wound cx grew Enterococcus, Aeromonas and Klebsiella - Zosyn is the ideal to cover All organisms but kidney function is worsening, hence change to Unasyn and Levaquin until kidney function is improved - The pathology shows Bone with acute osteomyelitis and necrotic periosteal tissue.   # OREN- s/p urinary retention -s/p ibrahim catheter - s/p discontinue ACEI  # RLL pneumonia- most likely Aspiration, S/p Vomiting - On Unasyn  # Large bowel distension  # Candiduria- 9/22/21  # Pulmonary edema with bilateral moderate to large pleural effusions- on CT chest, 10/5/21- s/p intubation 10/7- s/p Right sided chest tube placement by CT team, 10/8/21  # COVID Exposure - PCR negative as of  10/11/21, 10/21/21 and re-exposed and in Quarintine until 11/1/21    would recommend:    1. OOB to chair /PT  2. Monitor kidney function closely while on Zosyn   3. Aspiration precaution    4.. Wound care as per Podiatry  5. Continue Zosyn until 11/3/21    Attending Attestation:    Spent more than 35 minutes on total encounter, more than 50 % of the visit was spent counseling and/or coordinating care by the Attending physician.

## 2021-10-27 NOTE — PROGRESS NOTE ADULT - SUBJECTIVE AND OBJECTIVE BOX
Patient is a 76y old  Male who presents with a chief complaint of R Foot Pain (27 Oct 2021 10:16)    PATIENT IS SEEN AND EXAMINED IN MEDICAL FLOOR.  NGT [    ]    MADDY [   ]      GT [   ]    ALLERGIES:  No Known Allergies      Daily     Daily     VITALS:    Vital Signs Last 24 Hrs  T(C): 36.4 (27 Oct 2021 05:19), Max: 36.7 (26 Oct 2021 13:47)  T(F): 97.6 (27 Oct 2021 05:19), Max: 98 (26 Oct 2021 13:47)  HR: 82 (27 Oct 2021 05:19) (71 - 82)  BP: 119/58 (27 Oct 2021 05:19) (119/58 - 128/64)  BP(mean): --  RR: 16 (27 Oct 2021 05:19) (16 - 17)  SpO2: 100% (27 Oct 2021 05:19) (100% - 100%)    LABS:    CBC Full  -  ( 27 Oct 2021 06:23 )  WBC Count : 4.88 K/uL  RBC Count : 3.09 M/uL  Hemoglobin : 9.1 g/dL  Hematocrit : 28.0 %  Platelet Count - Automated : 273 K/uL  Mean Cell Volume : 90.6 fl  Mean Cell Hemoglobin : 29.4 pg  Mean Cell Hemoglobin Concentration : 32.5 gm/dL  Auto Neutrophil # : x  Auto Lymphocyte # : x  Auto Monocyte # : x  Auto Eosinophil # : x  Auto Basophil # : x  Auto Neutrophil % : x  Auto Lymphocyte % : x  Auto Monocyte % : x  Auto Eosinophil % : x  Auto Basophil % : x      10-27    140  |  109<H>  |  8   ----------------------------<  117<H>  4.2   |  24  |  1.25    Ca    8.9      27 Oct 2021 06:23      CAPILLARY BLOOD GLUCOSE      POCT Blood Glucose.: 118 mg/dL (27 Oct 2021 11:19)  POCT Blood Glucose.: 114 mg/dL (27 Oct 2021 08:04)  POCT Blood Glucose.: 166 mg/dL (26 Oct 2021 21:28)  POCT Blood Glucose.: 132 mg/dL (26 Oct 2021 16:28)          Creatinine Trend: 1.25<--, 1.17<--, 1.22<--, 1.07<--, 0.84<--, 1.00<--  I&O's Summary    26 Oct 2021 07:01  -  27 Oct 2021 07:00  --------------------------------------------------------  IN: 0 mL / OUT: 975 mL / NET: -975 mL            .Body Fluid Pleural Fluid  10-08 @ 18:51   No growth at 1 week.  --  --      .Body Fluid Pleural Fluid  10-08 @ 18:34   No growth at 5 days  --    polymorphonuclear leukocytes seen  No organisms seen  by cytocentrifuge      .Tissue Right 5th toe r/o osteomyeltis  09-22 @ 13:54   No growth at 5 days  --    No polymorphonuclear cells seen per low power field  No organisms seen per oil power field      .Blood Blood-Venous  09-17 @ 10:28   No Growth Final  --  --      .Surgical Swab right foot wound  09-16 @ 21:42   Culture yields >4 types of aerobic and/or anaerobic bacteria  Call client services within 7 days if further workup is clinically  indicated. Culture includes  Rare Aeromonas hydrophila/caviae  Few Klebsiella oxytoca/Raoutella ornithinolytica  Few Enterococcus faecalis  Few Streptococcus agalactiae (Group B) isolated  Group B streptococci are susceptible to ampicillin,  penicillin and cefazolin, but may be resistant to  erythromycin and clindamycin.  Recommendations for intrapartum prophylaxis for Group B  streptococci are penicillin or ampicillin.  --  Aeromonas hydrophila/caviae  Klebsiella oxytoca /Raoutella ornithinolytica  Enterococcus faecalis      Bone R 5th metatarsal bone clean ma  08-20 @ 03:59   No growth at 5 days  --    No polymorphonuclear leukocytes seen per low power field  No organisms seen per oil power field      .Blood Blood-Venous  08-14 @ 21:09   No Growth Final  --  --      .Surgical Swab Right foot plantar wound  08-14 @ 21:08   Moderate Streptococcus agalactiae (Group B) isolated  Group B streptococci are susceptible to ampicillin,  penicillin and cefazolin, but may be resistant to  erythromycin and clindamycin.  Recommendations for intrapartum prophylaxis for Group B  streptococci are penicillin or ampicillin.  Moderate Bacteroides fragilis "Susceptibilities not performed"  Normal skin filiberto isolated  --  --          MEDICATIONS:    MEDICATIONS  (STANDING):  apixaban 5 milliGRAM(s) Oral every 12 hours  aspirin  chewable 81 milliGRAM(s) Oral daily  atorvastatin 80 milliGRAM(s) Oral at bedtime  collagenase Ointment 1 Application(s) Topical daily  cyanocobalamin 1000 MICROGram(s) Oral daily  dextrose 5%. 1000 milliLiter(s) (100 mL/Hr) IV Continuous <Continuous>  ergocalciferol 32284 Unit(s) Oral <User Schedule>  ferrous    sulfate Liquid 300 milliGRAM(s) Enteral Tube daily  finasteride 5 milliGRAM(s) Oral daily  gabapentin 100 milliGRAM(s) Oral three times a day  glucagon  Injectable 1 milliGRAM(s) IntraMuscular once  insulin lispro (ADMELOG) corrective regimen sliding scale   SubCutaneous Before meals and at bedtime  metoprolol tartrate 25 milliGRAM(s) Oral two times a day  NIFEdipine XL 60 milliGRAM(s) Oral daily  pantoprazole  Injectable 40 milliGRAM(s) IV Push at bedtime  piperacillin/tazobactam IVPB.. 3.375 Gram(s) IV Intermittent every 8 hours      MEDICATIONS  (PRN):  acetaminophen   Tablet .. 650 milliGRAM(s) Oral every 6 hours PRN Temp greater or equal to 38C (100.4F), Moderate Pain (4 - 6)  ondansetron Injectable 4 milliGRAM(s) IV Push three times a day PRN Nausea and/or Vomiting  sodium chloride 0.9% lock flush 10 milliLiter(s) IV Push every 1 hour PRN Pre/post blood products, medications, blood draw, and to maintain line patency      REVIEW OF SYSTEMS:                           ALL ROS DONE [ X   ]    CONSTITUTIONAL:  LETHARGIC [   ], FEVER [   ], UNRESPONSIVE [   ]  CVS:  CP  [   ], SOB, [   ], PALPITATIONS [   ], DIZZYNESS [   ]  RS: COUGH [   ], SPUTUM [   ]  GI: ABDOMINAL PAIN [   ], NAUSEA [   ], VOMITINGS [   ], DIARRHEA [   ], CONSTIPATION [   ]  :  DYSURIA [   ], NOCTURIA [   ], INCREASED FREQUENCY [   ], DRIBLING [   ],  SKELETAL: PAINFUL JOINTS [   ], SWOLLEN JOINTS [   ], NECK ACHE [   ], LOW BACK ACHE [   ],  SKIN : ULCERS [   ], RASH [   ], ITCHING [   ]  CNS: HEAD ACHE [   ], DOUBLE VISION [   ], BLURRED VISION [   ], AMS / CONFUSION [   ], SEIZURES [   ], WEAKNESS [   ],TINGLING / NUMBNESS [   ]      PHYSICAL EXAMINATION:    GENERAL APPEARANCE: NO DISTRESS  HEENT:  NO PALLOR, NO  JVD,  NO   NODES, NECK SUPPLE  CVS: S1 +, S2 +,   RS: AEEB,  OCCASIONAL  RALES +,  RONCHI +  ABD: SOFT, NT, NO, BS +       EXT: NO PE  SKIN: WARM,   RIGHT FOOT WRAPPED  SKELETAL:  ROM ACCEPTABLE  CNS:  AAO X  2-3        RADIOLOGY :    < from: Transthoracic Echocardiogram (10.07.21 @ 15:26) >  CONCLUSIONS:  1. Normal mitral valve. Moderate mitral regurgitation.  2. Calcified aortic valve with decreased opening, cannot  exclude bicuspid. Peak transaortic valve gradient equals  32.4 mm Hg, mean transaortic valve gradient equals 19 mm  Hg, dimensionless index 0.22, estimated aortic valve area  equals 0.6sqcm (by continuity equation), consistent with  paradoxical low-flow low-gradient severe aortic stenosis.  Consider dobutamine stress echocardiogram and/or SABIHA for  further evaluation.  No aortic valve regurgitation seen.  3. Normal aortic root.  4. Normal left atrium.  5. Moderate concentric left ventricular hypertrophy.  6. Moderate global left ventricular systolic dysfunction  (EF 40-45% by visual estimation).  7. Grade II diastolic dysfunction (moderate).  8. Normal right atrium.  9. Normal right ventricular size with decreased RV systolic  function (TAPSE 1.2 cm).  10. RV systolic pressure is borderline normal at  34 mm Hg.  11. Tricuspid valve not well seen. Trace tricuspid  regurgitation.  12. Normal pulmonic valve. Trace pulmonic insufficiency is  noted.  13. No pericardial effusion.  14. Bilateral pleural effusions.    < end of copied text >      EXAM:  CT ABDOMEN AND PELVIS OC                            PROCEDURE DATE:  09/27/2021          INTERPRETATION:  CLINICAL INFORMATION: Small bowel obstruction.    COMPARISON: None.    CONTRAST/COMPLICATIONS:  IV Contrast: NONE  Oral Contrast: Omnipaque 300  Complications: None reported at time of study completion    PROCEDURE:  CT of the Abdomen and Pelvis was performed.  Sagittal and coronal reformats were performed.    FINDINGS:  LOWER CHEST: Small bilateral pleural effusions with compressiveatelectasis of both lower lobes.    LIVER: Within normal limits.  BILE DUCTS: Normal caliber.  GALLBLADDER: Distended. Small layering gallstones.  SPLEEN: Within normal limits.  PANCREAS: Within normal limits.  ADRENALS: Within normal limits.  KIDNEYS/URETERS: No hydronephrosis. Nonobstructing left upper pole calculus, measuring 0.6 cm.    BLADDER: Urinary bladder contains air and a Castañeda catheter balloon.  REPRODUCTIVE ORGANS: Prostate is not enlarged.    BOWEL: No bowel obstruction. Appendix isnormal. Colonic diverticulosis.  PERITONEUM: Mild presacral fluid.  VESSELS: Atherosclerotic changes.  RETROPERITONEUM/LYMPH NODES: No lymphadenopathy.  ABDOMINAL WALL: Within normal limits.  BONES: Degenerative changes. Sternotomy.    IMPRESSION:  No acute intra-abdominal pathology.    Small bilateral pleural effusions with compressive atelectasis of both lower lobes.    ====================================================    EXAM:  MR FOOT RT                            PROCEDURE DATE:  09/17/2021          INTERPRETATION:  Clinical Information: Recent fifth metatarsal head resection now with fifth digit pain, swelling and foul discharge.    Comparison: Radiographs of the right foot from 9/16/2021 and MRI the right foot from 8/16/2021.    Technique:  MRI of the right midfoot and forefoot.  Intravenous Contrast: None.    Findings:    There is a resection at the mid aspect of the fifth metatarsal shaft. There is a lateral soft tissue wound beginning at the level of the amputation which extends distally. There is susceptibility artifact in the region of the amputation consistent with postoperative change. There is hyperintense T2 marrow signal throughout the remaining fifth metatarsal and within the adjacent fourth metatarsal head which is nonspecific and could be related to recent postoperative changes although osteomyelitis is suspected.    There is edema and mild atrophy within the plantar muscles of the foot. There is minimal spurring at the first metatarsophalangeal and hallux sesamoid articulations.    Impression:  Resection at the mid aspect of the fifth metatarsal. Lateral soft tissue wound with marrow signal abnormality throughout the fifth metatarsal and within the adjacent fourth metatarsal head which is nonspecific in the setting of recent surgery although osteomyelitis is suspected.        ASSESSMENT :     Headache    HTN (hypertension)    HLD (hyperlipidemia)    DM (diabetes mellitus)    CAD (coronary artery disease)    Glaucoma, angle-closure    [place] (hard of hearing)      S/P CABG (coronary artery bypass graft)    History of thyroid surgery        PLAN:    HPI:  76M from home, lives with daughter w/ PMH DM on insulin, HTN, HLD, CAD, 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 Pt with sharp pain on the R lateral foot. As per daughter, pt was taking tylenol which did not help his pain prompting the patient to ask his daughter to take him to the hospital. Pt is a poor historian. Daughter states that the patient did not see his podiatrist after the surgery. No fever, chest, pain, palpitations, nausea, vomiting, diarrhea (17 Sep 2021 01:11)    # CASE D/W PATIENT CARE TEAM [facility]ERELLE DIAL TO HELP ASSIST WITH PATIENT AND FAMILY'S NEEDS AT THIS TIME     # PATIENT IS S/P ICU MONITORING AND RIGHT CHEST TUBE REMOVAL ON 10/15/2021     # COVID EXPOSURE ON 10/13 AND ONCE MORE HAD RE-EXPOSURE ON 10/22 - INFECTION CONTROL IS AWARE AND ADDRESSING - ON CONTACT AND DROPLET ISOLATION PRECAUTION; F/U COVID PCR    # SUSPECT RIGHT FOOT OSTEOMYELITIS S/P RIGHT 5TH RAY RESECTION [8/19] AND S/P DEBRIDEMENT, GRAFT APPLICATION, BONE BX AND WOUND VAC APPLICATION 9/22 - BONE BX W/ ACUTE OSTEOMYELITIS AND NECROTIC PERIOSTEAL TISSUE  ** RECENT ADMISSION FOR RIGHT DIABETIC FOOT ULCER, CELLULITIS, OSTEOMYELITIS RIGHT 5th METATARSAL S/P RIGHT 5TH PARTIAL RAY AMPUTATION [8/20] - BONE PATHOLOGY W/ CLEAN MARGINS    - S/P VANCOMYCIN AND ZOSYN ; NOW ON UNASYN AND LEVAQUIN GIVEN OREN; PER ID SWITCH TO ZOSYN UNTIL 11/3   - RECENTLY HAD SIMON W/ MILD PAD  - REVIEWED WOUND CX [ ENTEROCOCCUS, AEROMONAS, KLEBSIELLA] AND BCX  - ID CONSULT, PODIATRY CONSULT    - PICC LINE PLACED TO COMPLETE 6 WEEKS OF ANTIBIOTICS - ON ZOSYN UNTIL 11/3 ON D/C    # ACUTE HYPOXIC RESPIRATORY FAILURE DUE TO ACUTE ON CHRONIC SYSTOLIC CHF  (  LV EF 40- 45 % ) , DIASTOLIC LV DYSFUNCTION , ,  SEVERE AORTIC STENOSIS & MODERATE MITRAL REGURGITATION  &  ASPIRATION PNA;  - S/P CHEST TUBE INSERTION AND REMOVAL  , S/P LASIX ,   CARDIOLOGY CONSULT IN PROGRESS      - CHEST TUBE PLACED [10/8] - FLUID CONSISTENT WITH TRANSUDATIVE PROCESS  - S/P EXTUBATION 10/13  - S/P CHEST TUBE REMOVAL 10/15      # SEPTIC SHOCK - ? S/T HYPOVOLEMIA VS. SEPSIS - S/P CVC [SWITCHED FROM FEMORAL TO LEFT IJ], S/P VASOPRESSORS - RESOLVING  - BCX - NGTD  - ON MEROPENEM      # TRANSIENT NEW ONSET A.FIB, PAROXYSMAL - ON ELIQUIS, CARDIOLOGY CONSULT IN PROGRESS    # FUNGURIA - D/C FLUCONAZOLE GIVEN TRANSAMINITIS    # TRANSAMINITIS - SUSPECT THIS IS ISCHEMIC HEPATITIS - IMPROVING - HEPATOLOGY CONSULT IN PROGRESS    # BOWEL DISTENTION - ? ILEUS - OPTIMIZING ELECTROLYTES - IMPROVED  - SURGERY CONSULT IN PROGRESS    # CHOLELITHIASIS - TRENDING LFTS, RUQ U/S - CHOLELITHIASIS, S/P SURGERY CONSULT   - S/P GI CONSULT - LESS SUSPICIOUS FOR CHOLECYSTITIS    # DYSPEPSIA - PLACED ON PPI BID WITH IMPROVEMENT, UNDERWENT ST EVAL     # ELEVATED TROPONINS - NSTEMI - ? DEMAND - WILL NEED ISCHEMIC EVAL ONCE ACUTE INFECTION RESOLVES PER CARDIOLOGY, CARDIOLOGY CONSULT IN PROGRESS  - ON ASA, STATIN, BB    # OREN ON CKD - S/T ATN AND URINARY RETENTION - S/P IVF, NOW W/ CASTAÑEDA, NEPHROLOGY CONUSLT IN PROGRESS  -  BPH ON FLOMAX, AND FINASTERIDE  - FAILED TOV WITH WORSENING RENAL FXN - NOTED URINARY RETENTION [10/4] - REPLACED CASTAÑEDA  - TOV 10/18 - BLADDER SCAN BID TO EVALUATE FOR URINARY RETENTION - FAILED TOV  - OVERNIGHT 10/18 - CASTAÑEDA REPLACED    # MEDICAL CLEARANCE FOR SURGERY - RCRI - 2 - 10.1 % 30 DAY RISK OF DEATH, MI OR CARDIAC ARREST  - CARDIOLOGY CONSULT     # DM -  HBA1C - 11  - LANTUS, SSI + FS    # CAD S/P CABG - PLACED ON ASA, STATIN, BB  - ECHO - SEVERE AORTIC STENOSIS, SEVERE CONCENTRIC LVH, G1DD, MILD PA  - CARDIOLOGY CONSULT - DR. BASSETT   - MAY NEED ISCHEMIC EVAL ONCE ACUTE ILLNESS RESOLVES INCLUDING CARDIAC CATHETERIZATION    # HTN, HLD  - ON METOPROLOL, NIFEDIPINE, STATIN       #  IMPAIRED GAIT DUE TO CERVICAL & LS SPONDYLOSIS, POLY ARTHRITIS AND DIABETIC PERIPHERAL NEUROPATHY, OP VITAMIN D DEFICIENCY - ON CHOLECALCIFEROL, OBTAIN PT & OT   #  FAILURE TO THRIVE , MODERATE PROTEIN CALORIE MALNUTRITION       # CASE DISCUSSED AT LENGTH WITH PATIENT AND DAUGHTER, BIRDIE ROBERT VIA PHONE @ [phone] [10/25] - CASE DICUSSED AT LENGTH AND ALL QUESTIONS WERE ANSWERED. DAUGHTER UNDERSTOOD THAT PROGNOSIS IS GUARDED.  # PATIENT AND DAUGHTER REFUSED [facility] ON PREVIOUS ADMISSION, PREVIOUSLY WISHED FOR PATIENT RETURN HOME W/ HOME PT; HOWEVER NOW, DAUGHTER WISHES FOR PATIENT TO GO TO [facility], AS PATIENT'S WIFE IS CURRENTLY ADMITTED THERE    # GI AND DVT PPX     Patient is a 76y old  Male who presents with a chief complaint of R Foot Pain (27 Oct 2021 10:16)    PATIENT IS SEEN AND EXAMINED IN MEDICAL FLOOR.    ALLERGIES:  No Known Allergies    VITALS:    Vital Signs Last 24 Hrs  T(C): 36.4 (27 Oct 2021 05:19), Max: 36.7 (26 Oct 2021 13:47)  T(F): 97.6 (27 Oct 2021 05:19), Max: 98 (26 Oct 2021 13:47)  HR: 82 (27 Oct 2021 05:19) (71 - 82)  BP: 119/58 (27 Oct 2021 05:19) (119/58 - 128/64)  BP(mean): --  RR: 16 (27 Oct 2021 05:19) (16 - 17)  SpO2: 100% (27 Oct 2021 05:19) (100% - 100%)    LABS:    CBC Full  -  ( 27 Oct 2021 06:23 )  WBC Count : 4.88 K/uL  RBC Count : 3.09 M/uL  Hemoglobin : 9.1 g/dL  Hematocrit : 28.0 %  Platelet Count - Automated : 273 K/uL  Mean Cell Volume : 90.6 fl  Mean Cell Hemoglobin : 29.4 pg  Mean Cell Hemoglobin Concentration : 32.5 gm/dL  Auto Neutrophil # : x  Auto Lymphocyte # : x  Auto Monocyte # : x  Auto Eosinophil # : x  Auto Basophil # : x  Auto Neutrophil % : x  Auto Lymphocyte % : x  Auto Monocyte % : x  Auto Eosinophil % : x  Auto Basophil % : x      10-27    140  |  109<H>  |  8   ----------------------------<  117<H>  4.2   |  24  |  1.25    Ca    8.9      27 Oct 2021 06:23      CAPILLARY BLOOD GLUCOSE      POCT Blood Glucose.: 118 mg/dL (27 Oct 2021 11:19)  POCT Blood Glucose.: 114 mg/dL (27 Oct 2021 08:04)  POCT Blood Glucose.: 166 mg/dL (26 Oct 2021 21:28)  POCT Blood Glucose.: 132 mg/dL (26 Oct 2021 16:28)          Creatinine Trend: 1.25<--, 1.17<--, 1.22<--, 1.07<--, 0.84<--, 1.00<--  I&O's Summary    26 Oct 2021 07:01  -  27 Oct 2021 07:00  --------------------------------------------------------  IN: 0 mL / OUT: 975 mL / NET: -975 mL            .Body Fluid Pleural Fluid  10-08 @ 18:51   No growth at 1 week.  --  --      .Body Fluid Pleural Fluid  10-08 @ 18:34   No growth at 5 days  --    polymorphonuclear leukocytes seen  No organisms seen  by cytocentrifuge      .Tissue Right 5th toe r/o osteomyeltis  09-22 @ 13:54   No growth at 5 days  --    No polymorphonuclear cells seen per low power field  No organisms seen per oil power field      .Blood Blood-Venous  09-17 @ 10:28   No Growth Final  --  --      .Surgical Swab right foot wound  09-16 @ 21:42   Culture yields >4 types of aerobic and/or anaerobic bacteria  Call client services within 7 days if further workup is clinically  indicated. Culture includes  Rare Aeromonas hydrophila/caviae  Few Klebsiella oxytoca/Raoutella ornithinolytica  Few Enterococcus faecalis  Few Streptococcus agalactiae (Group B) isolated  Group B streptococci are susceptible to ampicillin,  penicillin and cefazolin, but may be resistant to  erythromycin and clindamycin.  Recommendations for intrapartum prophylaxis for Group B  streptococci are penicillin or ampicillin.  --  Aeromonas hydrophila/caviae  Klebsiella oxytoca /Raoutella ornithinolytica  Enterococcus faecalis      Bone R 5th metatarsal bone clean ma  08-20 @ 03:59   No growth at 5 days  --    No polymorphonuclear leukocytes seen per low power field  No organisms seen per oil power field      .Blood Blood-Venous  08-14 @ 21:09   No Growth Final  --  --      .Surgical Swab Right foot plantar wound  08-14 @ 21:08   Moderate Streptococcus agalactiae (Group B) isolated  Group B streptococci are susceptible to ampicillin,  penicillin and cefazolin, but may be resistant to  erythromycin and clindamycin.  Recommendations for intrapartum prophylaxis for Group B  streptococci are penicillin or ampicillin.  Moderate Bacteroides fragilis "Susceptibilities not performed"  Normal skin filiberto isolated  --  --          MEDICATIONS:    MEDICATIONS  (STANDING):  apixaban 5 milliGRAM(s) Oral every 12 hours  aspirin  chewable 81 milliGRAM(s) Oral daily  atorvastatin 80 milliGRAM(s) Oral at bedtime  collagenase Ointment 1 Application(s) Topical daily  cyanocobalamin 1000 MICROGram(s) Oral daily  dextrose 5%. 1000 milliLiter(s) (100 mL/Hr) IV Continuous <Continuous>  ergocalciferol 74823 Unit(s) Oral <User Schedule>  ferrous    sulfate Liquid 300 milliGRAM(s) Enteral Tube daily  finasteride 5 milliGRAM(s) Oral daily  gabapentin 100 milliGRAM(s) Oral three times a day  glucagon  Injectable 1 milliGRAM(s) IntraMuscular once  insulin lispro (ADMELOG) corrective regimen sliding scale   SubCutaneous Before meals and at bedtime  metoprolol tartrate 25 milliGRAM(s) Oral two times a day  NIFEdipine XL 60 milliGRAM(s) Oral daily  pantoprazole  Injectable 40 milliGRAM(s) IV Push at bedtime  piperacillin/tazobactam IVPB.. 3.375 Gram(s) IV Intermittent every 8 hours      MEDICATIONS  (PRN):  acetaminophen   Tablet .. 650 milliGRAM(s) Oral every 6 hours PRN Temp greater or equal to 38C (100.4F), Moderate Pain (4 - 6)  ondansetron Injectable 4 milliGRAM(s) IV Push three times a day PRN Nausea and/or Vomiting  sodium chloride 0.9% lock flush 10 milliLiter(s) IV Push every 1 hour PRN Pre/post blood products, medications, blood draw, and to maintain line patency      REVIEW OF SYSTEMS:                           ALL ROS DONE [ X   ]    CONSTITUTIONAL:  LETHARGIC [   ], FEVER [   ], UNRESPONSIVE [   ]  CVS:  CP  [   ], SOB, [   ], PALPITATIONS [   ], DIZZYNESS [   ]  RS: COUGH [   ], SPUTUM [   ]  GI: ABDOMINAL PAIN [   ], NAUSEA [   ], VOMITINGS [   ], DIARRHEA [   ], CONSTIPATION [   ]  :  DYSURIA [   ], NOCTURIA [   ], INCREASED FREQUENCY [   ], DRIBLING [   ],  SKELETAL: PAINFUL JOINTS [   ], SWOLLEN JOINTS [   ], NECK ACHE [   ], LOW BACK ACHE [   ],  SKIN : ULCERS [   ], RASH [   ], ITCHING [   ]  CNS: HEAD ACHE [   ], DOUBLE VISION [   ], BLURRED VISION [   ], AMS / CONFUSION [   ], SEIZURES [   ], WEAKNESS [   ],TINGLING / NUMBNESS [   ]      PHYSICAL EXAMINATION:    GENERAL APPEARANCE: NO DISTRESS  HEENT:  NO PALLOR, NO  JVD,  NO   NODES, NECK SUPPLE  CVS: S1 +, S2 +,   RS: AEEB,  OCCASIONAL  RALES +,  RONCHI +  ABD: SOFT, NT, NO, BS +       EXT: NO PE  SKIN: WARM,   RIGHT FOOT WRAPPED  SKELETAL:  ROM ACCEPTABLE  CNS:  AAO X  2-3        RADIOLOGY :    < from: Transthoracic Echocardiogram (10.07.21 @ 15:26) >  CONCLUSIONS:  1. Normal mitral valve. Moderate mitral regurgitation.  2. Calcified aortic valve with decreased opening, cannot  exclude bicuspid. Peak transaortic valve gradient equals  32.4 mm Hg, mean transaortic valve gradient equals 19 mm  Hg, dimensionless index 0.22, estimated aortic valve area  equals 0.6sqcm (by continuity equation), consistent with  paradoxical low-flow low-gradient severe aortic stenosis.  Consider dobutamine stress echocardiogram and/or SABIHA for  further evaluation.  No aortic valve regurgitation seen.  3. Normal aortic root.  4. Normal left atrium.  5. Moderate concentric left ventricular hypertrophy.  6. Moderate global left ventricular systolic dysfunction  (EF 40-45% by visual estimation).  7. Grade II diastolic dysfunction (moderate).  8. Normal right atrium.  9. Normal right ventricular size with decreased RV systolic  function (TAPSE 1.2 cm).  10. RV systolic pressure is borderline normal at  34 mm Hg.  11. Tricuspid valve not well seen. Trace tricuspid  regurgitation.  12. Normal pulmonic valve. Trace pulmonic insufficiency is  noted.  13. No pericardial effusion.  14. Bilateral pleural effusions.    < end of copied text >      EXAM:  CT ABDOMEN AND PELVIS OC                            PROCEDURE DATE:  09/27/2021          INTERPRETATION:  CLINICAL INFORMATION: Small bowel obstruction.    COMPARISON: None.    CONTRAST/COMPLICATIONS:  IV Contrast: NONE  Oral Contrast: Omnipaque 300  Complications: None reported at time of study completion    PROCEDURE:  CT of the Abdomen and Pelvis was performed.  Sagittal and coronal reformats were performed.    FINDINGS:  LOWER CHEST: Small bilateral pleural effusions with compressiveatelectasis of both lower lobes.    LIVER: Within normal limits.  BILE DUCTS: Normal caliber.  GALLBLADDER: Distended. Small layering gallstones.  SPLEEN: Within normal limits.  PANCREAS: Within normal limits.  ADRENALS: Within normal limits.  KIDNEYS/URETERS: No hydronephrosis. Nonobstructing left upper pole calculus, measuring 0.6 cm.    BLADDER: Urinary bladder contains air and a Castañeda catheter balloon.  REPRODUCTIVE ORGANS: Prostate is not enlarged.    BOWEL: No bowel obstruction. Appendix isnormal. Colonic diverticulosis.  PERITONEUM: Mild presacral fluid.  VESSELS: Atherosclerotic changes.  RETROPERITONEUM/LYMPH NODES: No lymphadenopathy.  ABDOMINAL WALL: Within normal limits.  BONES: Degenerative changes. Sternotomy.    IMPRESSION:  No acute intra-abdominal pathology.    Small bilateral pleural effusions with compressive atelectasis of both lower lobes.    ====================================================    EXAM:  MR FOOT RT                            PROCEDURE DATE:  09/17/2021          INTERPRETATION:  Clinical Information: Recent fifth metatarsal head resection now with fifth digit pain, swelling and foul discharge.    Comparison: Radiographs of the right foot from 9/16/2021 and MRI the right foot from 8/16/2021.    Technique:  MRI of the right midfoot and forefoot.  Intravenous Contrast: None.    Findings:    There is a resection at the mid aspect of the fifth metatarsal shaft. There is a lateral soft tissue wound beginning at the level of the amputation which extends distally. There is susceptibility artifact in the region of the amputation consistent with postoperative change. There is hyperintense T2 marrow signal throughout the remaining fifth metatarsal and within the adjacent fourth metatarsal head which is nonspecific and could be related to recent postoperative changes although osteomyelitis is suspected.    There is edema and mild atrophy within the plantar muscles of the foot. There is minimal spurring at the first metatarsophalangeal and hallux sesamoid articulations.    Impression:  Resection at the mid aspect of the fifth metatarsal. Lateral soft tissue wound with marrow signal abnormality throughout the fifth metatarsal and within the adjacent fourth metatarsal head which is nonspecific in the setting of recent surgery although osteomyelitis is suspected.        ASSESSMENT :     Headache    HTN (hypertension)    HLD (hyperlipidemia)    DM (diabetes mellitus)    CAD (coronary artery disease)    Glaucoma, angle-closure    [place] (hard of hearing)      S/P CABG (coronary artery bypass graft)    History of thyroid surgery        PLAN:    HPI:  76M from home, lives with daughter w/ PMH DM on insulin, HTN, HLD, CAD, 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 Pt with sharp pain on the R lateral foot. As per daughter, pt was taking tylenol which did not help his pain prompting the patient to ask his daughter to take him to the hospital. Pt is a poor historian. Daughter states that the patient did not see his podiatrist after the surgery. No fever, chest, pain, palpitations, nausea, vomiting, diarrhea (17 Sep 2021 01:11)    # CASE D/W PATIENT CARE TEAM CHERELLE DIAL TO HELP ASSIST WITH PATIENT AND FAMILY'S NEEDS AT THIS TIME     # PATIENT IS S/P ICU MONITORING AND RIGHT CHEST TUBE REMOVAL ON 10/15/2021     # COVID EXPOSURE ON 10/13 AND ONCE MORE HAD RE-EXPOSURE ON 10/22 - INFECTION CONTROL IS AWARE AND ADDRESSING - ON CONTACT AND DROPLET ISOLATION PRECAUTION; F/U COVID PCR    # SUSPECT RIGHT FOOT OSTEOMYELITIS S/P RIGHT 5TH RAY RESECTION [8/19] AND S/P DEBRIDEMENT, GRAFT APPLICATION, BONE BX AND WOUND VAC APPLICATION 9/22 - BONE BX W/ ACUTE OSTEOMYELITIS AND NECROTIC PERIOSTEAL TISSUE  ** RECENT ADMISSION FOR RIGHT DIABETIC FOOT ULCER, CELLULITIS, OSTEOMYELITIS RIGHT 5th METATARSAL S/P RIGHT 5TH PARTIAL RAY AMPUTATION [8/20] - BONE PATHOLOGY W/ CLEAN MARGINS    - S/P VANCOMYCIN AND ZOSYN ; NOW ON UNASYN AND LEVAQUIN GIVEN OREN; PER ID SWITCH TO ZOSYN UNTIL 11/3   - RECENTLY HAD SIMON W/ MILD PAD  - REVIEWED WOUND CX [ ENTEROCOCCUS, AEROMONAS, KLEBSIELLA] AND BCX  - ID CONSULT, PODIATRY CONSULT    - PICC LINE PLACED TO COMPLETE 6 WEEKS OF ANTIBIOTICS - ON ZOSYN UNTIL 11/3 ON D/C    # ACUTE HYPOXIC RESPIRATORY FAILURE DUE TO ACUTE ON CHRONIC SYSTOLIC CHF  (  LV EF 40- 45 % ) , DIASTOLIC LV DYSFUNCTION , ,  SEVERE AORTIC STENOSIS & MODERATE MITRAL REGURGITATION  &  ASPIRATION PNA;  - S/P CHEST TUBE INSERTION AND REMOVAL  , S/P LASIX ,   CARDIOLOGY CONSULT IN PROGRESS      - CHEST TUBE PLACED [10/8] - FLUID CONSISTENT WITH TRANSUDATIVE PROCESS  - S/P EXTUBATION 10/13  - S/P CHEST TUBE REMOVAL 10/15      # SEPTIC SHOCK - ? S/T HYPOVOLEMIA VS. SEPSIS - S/P CVC [SWITCHED FROM FEMORAL TO LEFT IJ], S/P VASOPRESSORS - RESOLVING  - BCX - NGTD  - ON MEROPENEM      # TRANSIENT NEW ONSET A.FIB, PAROXYSMAL - ON ELIQUIS, CARDIOLOGY CONSULT IN PROGRESS    # FUNGURIA - D/C FLUCONAZOLE GIVEN TRANSAMINITIS    # TRANSAMINITIS - SUSPECT THIS IS ISCHEMIC HEPATITIS - IMPROVING - HEPATOLOGY CONSULT IN PROGRESS    # BOWEL DISTENTION - ? ILEUS - OPTIMIZING ELECTROLYTES - IMPROVED  - SURGERY CONSULT IN PROGRESS    # CHOLELITHIASIS - TRENDING LFTS, RUQ U/S - CHOLELITHIASIS, S/P SURGERY CONSULT   - S/P GI CONSULT - LESS SUSPICIOUS FOR CHOLECYSTITIS    # DYSPEPSIA - PLACED ON PPI BID WITH IMPROVEMENT, UNDERWENT ST EVAL     # ELEVATED TROPONINS - NSTEMI - ? DEMAND - WILL NEED ISCHEMIC EVAL ONCE ACUTE INFECTION RESOLVES PER CARDIOLOGY, CARDIOLOGY CONSULT IN PROGRESS  - ON ASA, STATIN, BB    # OREN ON CKD - S/T ATN AND URINARY RETENTION - S/P IVF, NOW W/ CASTAÑEDA, NEPHROLOGY CONUSLT IN PROGRESS  -  BPH ON FLOMAX, AND FINASTERIDE  - FAILED TOV WITH WORSENING RENAL FXN - NOTED URINARY RETENTION [10/4] - REPLACED CASTAÑEDA  - TOV 10/18 - BLADDER SCAN BID TO EVALUATE FOR URINARY RETENTION - FAILED TOV  - OVERNIGHT 10/18 - CASTAÑEDA REPLACED    # MEDICAL CLEARANCE FOR SURGERY - RCRI - 2 - 10.1 % 30 DAY RISK OF DEATH, MI OR CARDIAC ARREST  - CARDIOLOGY CONSULT     # DM -  HBA1C - 11  - LANTUS, SSI + FS    # CAD S/P CABG - PLACED ON ASA, STATIN, BB  - ECHO - SEVERE AORTIC STENOSIS, SEVERE CONCENTRIC LVH, G1DD, MILD TX  - CARDIOLOGY CONSULT - DR. BASSETT   - MAY NEED ISCHEMIC EVAL ONCE ACUTE ILLNESS RESOLVES INCLUDING CARDIAC CATHETERIZATION    # HTN, HLD  - ON METOPROLOL, NIFEDIPINE, STATIN       #  IMPAIRED GAIT DUE TO CERVICAL & LS SPONDYLOSIS, POLY ARTHRITIS AND DIABETIC PERIPHERAL NEUROPATHY, OP VITAMIN D DEFICIENCY - ON CHOLECALCIFEROL, OBTAIN PT & OT   #  FAILURE TO THRIVE , MODERATE PROTEIN CALORIE MALNUTRITION       # CASE DISCUSSED AT LENGTH WITH PATIENT AND DAUGHTER, BIRDIE ROBERT VIA PHONE @ [phone] [10/25] - CASE DICUSSED AT LENGTH AND ALL QUESTIONS WERE ANSWERED. DAUGHTER UNDERSTOOD THAT PROGNOSIS IS GUARDED.  # PATIENT AND DAUGHTER REFUSED [facility] ON PREVIOUS ADMISSION, PREVIOUSLY WISHED FOR PATIENT RETURN HOME W/ HOME PT; HOWEVER NOW, DAUGHTER WISHES FOR PATIENT TO GO TO [facility], AS PATIENT'S WIFE IS CURRENTLY ADMITTED THERE    # GI AND DVT PPX

## 2021-10-27 NOTE — PROGRESS NOTE ADULT - PROBLEM SELECTOR PLAN 11
COVID exposure 10/23, quarantine until 11/1  Pt will be medically stable for discharge s/p quarantine  Follow up PCR 10/30  PT recommending STEVAN  Family has selected [facility]- will lacey auth  Care management following for discharge planning

## 2021-10-27 NOTE — PROGRESS NOTE ADULT - SUBJECTIVE AND OBJECTIVE BOX
NP Note discussed with  Primary Attending    Patient is a 76y old  Male who presents with a chief complaint of R Foot Pain (26 Oct 2021 15:27)      INTERVAL HPI/OVERNIGHT EVENTS: Patient seen and examined at bedside, no new complaints    MEDICATIONS  (STANDING):  apixaban 5 milliGRAM(s) Oral every 12 hours  aspirin  chewable 81 milliGRAM(s) Oral daily  atorvastatin 80 milliGRAM(s) Oral at bedtime  collagenase Ointment 1 Application(s) Topical daily  cyanocobalamin 1000 MICROGram(s) Oral daily  dextrose 5%. 1000 milliLiter(s) (100 mL/Hr) IV Continuous <Continuous>  ergocalciferol 47546 Unit(s) Oral <User Schedule>  ferrous    sulfate Liquid 300 milliGRAM(s) Enteral Tube daily  finasteride 5 milliGRAM(s) Oral daily  gabapentin 100 milliGRAM(s) Oral three times a day  glucagon  Injectable 1 milliGRAM(s) IntraMuscular once  insulin lispro (ADMELOG) corrective regimen sliding scale   SubCutaneous Before meals and at bedtime  metoprolol tartrate 25 milliGRAM(s) Oral two times a day  NIFEdipine XL 60 milliGRAM(s) Oral daily  pantoprazole  Injectable 40 milliGRAM(s) IV Push at bedtime  piperacillin/tazobactam IVPB.. 3.375 Gram(s) IV Intermittent every 8 hours    MEDICATIONS  (PRN):  acetaminophen   Tablet .. 650 milliGRAM(s) Oral every 6 hours PRN Temp greater or equal to 38C (100.4F), Moderate Pain (4 - 6)  ondansetron Injectable 4 milliGRAM(s) IV Push three times a day PRN Nausea and/or Vomiting  sodium chloride 0.9% lock flush 10 milliLiter(s) IV Push every 1 hour PRN Pre/post blood products, medications, blood draw, and to maintain line patency      __________________________________________________  REVIEW OF SYSTEMS:    CONSTITUTIONAL: No fever,   EYES: no acute visual disturbances  NECK: No pain or stiffness  RESPIRATORY: No cough; No shortness of breath  CARDIOVASCULAR: No chest pain, no palpitations  GASTROINTESTINAL: No pain. No nausea or vomiting; No diarrhea   NEUROLOGICAL: No headache or numbness, no tremors  MUSCULOSKELETAL: No joint pain, no muscle pain  GENITOURINARY: no dysuria, no frequency, no hesitancy  PSYCHIATRY: no depression , no anxiety  ALL OTHER  ROS negative        Vital Signs Last 24 Hrs  T(C): 36.4 (27 Oct 2021 05:19), Max: 36.7 (26 Oct 2021 13:47)  T(F): 97.6 (27 Oct 2021 05:19), Max: 98 (26 Oct 2021 13:47)  HR: 82 (27 Oct 2021 05:19) (71 - 82)  BP: 119/58 (27 Oct 2021 05:19) (119/58 - 128/64)  BP(mean): --  RR: 16 (27 Oct 2021 05:19) (16 - 17)  SpO2: 100% (27 Oct 2021 05:19) (100% - 100%)    ________________________________________________  PHYSICAL EXAM:  GENERAL: NAD  HEENT: Normocephalic;  conjunctivae and sclerae clear; moist mucous membranes;   NECK : supple  CHEST/LUNG: Clear to auscultation bilaterally with good air entry   HEART: S1 S2  regular; no murmurs, gallops or rubs  ABDOMEN: Soft, Nontender, Nondistended; Bowel sounds present  EXTREMITIES: right foot ace bandage, no cyanosis; no edema; no calf tenderness  SKIN: right foot ulceration, perianal wound  NERVOUS SYSTEM:  Awake and alert; Oriented  to person      _________________________________________________  LABS:                        9.1    4.88  )-----------( 273      ( 27 Oct 2021 06:23 )             28.0     10-27    140  |  109<H>  |  8   ----------------------------<  117<H>  4.2   |  24  |  1.25    Ca    8.9      27 Oct 2021 06:23          CAPILLARY BLOOD GLUCOSE      POCT Blood Glucose.: 114 mg/dL (27 Oct 2021 08:04)  POCT Blood Glucose.: 166 mg/dL (26 Oct 2021 21:28)  POCT Blood Glucose.: 132 mg/dL (26 Oct 2021 16:28)  POCT Blood Glucose.: 136 mg/dL (26 Oct 2021 11:34)        RADIOLOGY & ADDITIONAL TESTS:    Imaging  Reviewed:  YES/NO    Consultant(s) Notes Reviewed:   YES/ No      Plan of care was discussed with patient and /or primary care giver; all questions and concerns were addressed

## 2021-10-27 NOTE — PROGRESS NOTE ADULT - SUBJECTIVE AND OBJECTIVE BOX
C A R D I O L O G Y  **********************************    DATE OF SERVICE: 10-27-21    Patient denies chest pain or shortness of breath.   Review of symptoms otherwise negative.    acetaminophen   Tablet .. 650 milliGRAM(s) Oral every 6 hours PRN  apixaban 5 milliGRAM(s) Oral every 12 hours  aspirin  chewable 81 milliGRAM(s) Oral daily  atorvastatin 80 milliGRAM(s) Oral at bedtime  chlorhexidine 2% Cloths 1 Application(s) Topical <User Schedule>  collagenase Ointment 1 Application(s) Topical daily  cyanocobalamin 1000 MICROGram(s) Oral daily  dextrose 5%. 1000 milliLiter(s) IV Continuous <Continuous>  ergocalciferol 36095 Unit(s) Oral <User Schedule>  ferrous    sulfate Liquid 300 milliGRAM(s) Enteral Tube daily  finasteride 5 milliGRAM(s) Oral daily  gabapentin 100 milliGRAM(s) Oral three times a day  glucagon  Injectable 1 milliGRAM(s) IntraMuscular once  insulin lispro (ADMELOG) corrective regimen sliding scale   SubCutaneous Before meals and at bedtime  metoprolol tartrate 25 milliGRAM(s) Oral two times a day  NIFEdipine XL 60 milliGRAM(s) Oral daily  ondansetron Injectable 4 milliGRAM(s) IV Push three times a day PRN  pantoprazole  Injectable 40 milliGRAM(s) IV Push at bedtime  piperacillin/tazobactam IVPB.. 3.375 Gram(s) IV Intermittent every 8 hours  sodium chloride 0.9% lock flush 10 milliLiter(s) IV Push every 1 hour PRN                            9.1    4.88  )-----------( 273      ( 27 Oct 2021 06:23 )             28.0       Hemoglobin: 9.1 g/dL (10-27 @ 06:23)  Hemoglobin: 9.6 g/dL (10-26 @ 18:41)  Hemoglobin: 9.7 g/dL (10-25 @ 08:37)  Hemoglobin: 10.4 g/dL (10-23 @ 06:22)      10-27    140  |  109<H>  |  8   ----------------------------<  117<H>  4.2   |  24  |  1.25    Ca    8.9      27 Oct 2021 06:23      Creatinine Trend: 1.25<--, 1.17<--, 1.22<--, 1.07<--, 0.84<--, 1.00<--    COAGS:           T(C): 36.4 (10-27-21 @ 05:19), Max: 36.7 (10-26-21 @ 13:47)  HR: 82 (10-27-21 @ 05:19) (71 - 82)  BP: 119/58 (10-27-21 @ 05:19) (119/58 - 128/64)  RR: 16 (10-27-21 @ 05:19) (16 - 17)  SpO2: 100% (10-27-21 @ 05:19) (100% - 100%)  Wt(kg): --    I&O's Summary    26 Oct 2021 07:01  -  27 Oct 2021 07:00  --------------------------------------------------------  IN: 0 mL / OUT: 975 mL / NET: -975 mL      HEENT:  (-)icterus (-)pallor  CV: N S1 S2 1/6 TRINIDAD (+)2 Pulses B/l  Resp:  Coarse sounds B/L, normal effort  GI: (+) BS Soft, NT, ND  Lymph:  (-)Edema, (-)obvious lymphadenopathy  Skin: Warm to touch, Normal turgor  Psych:  appropriate mood and affect        ASSESSMENT/PLAN: 	76y  Male PMH DM on insulin, HTN, HLD, normal lV fx moderate to severe AS 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    - crt improved  -  complete course of Abx per ID  - Echo noted, Severe AS, EF 40-45%   - He will need a SABIHA and R+L heart cath when he recovers and has no active infection  - Cont medical management of CAD  - Pt. with new onset PAF now on Eliquis  - Pig tail removed on 10/16  - Abx per ID    Zak Wilkins MD, [facility]  BEEPER [phone]

## 2021-10-28 LAB
GLUCOSE BLDC GLUCOMTR-MCNC: 111 MG/DL — HIGH (ref 70–99)
GLUCOSE BLDC GLUCOMTR-MCNC: 113 MG/DL — HIGH (ref 70–99)
GLUCOSE BLDC GLUCOMTR-MCNC: 115 MG/DL — HIGH (ref 70–99)
GLUCOSE BLDC GLUCOMTR-MCNC: 116 MG/DL — HIGH (ref 70–99)
GLUCOSE BLDC GLUCOMTR-MCNC: 121 MG/DL — HIGH (ref 70–99)

## 2021-10-28 RX ADMIN — GABAPENTIN 100 MILLIGRAM(S): 400 CAPSULE ORAL at 21:59

## 2021-10-28 RX ADMIN — APIXABAN 5 MILLIGRAM(S): 2.5 TABLET, FILM COATED ORAL at 17:45

## 2021-10-28 RX ADMIN — PIPERACILLIN AND TAZOBACTAM 25 GRAM(S): 4; .5 INJECTION, POWDER, LYOPHILIZED, FOR SOLUTION INTRAVENOUS at 21:55

## 2021-10-28 RX ADMIN — ATORVASTATIN CALCIUM 80 MILLIGRAM(S): 80 TABLET, FILM COATED ORAL at 21:54

## 2021-10-28 RX ADMIN — Medication 300 MILLIGRAM(S): at 12:00

## 2021-10-28 RX ADMIN — Medication 1 APPLICATION(S): at 11:59

## 2021-10-28 RX ADMIN — GABAPENTIN 100 MILLIGRAM(S): 400 CAPSULE ORAL at 13:34

## 2021-10-28 RX ADMIN — FINASTERIDE 5 MILLIGRAM(S): 5 TABLET, FILM COATED ORAL at 12:00

## 2021-10-28 RX ADMIN — PREGABALIN 1000 MICROGRAM(S): 225 CAPSULE ORAL at 12:00

## 2021-10-28 RX ADMIN — Medication 60 MILLIGRAM(S): at 06:09

## 2021-10-28 RX ADMIN — PANTOPRAZOLE SODIUM 40 MILLIGRAM(S): 20 TABLET, DELAYED RELEASE ORAL at 21:54

## 2021-10-28 RX ADMIN — GABAPENTIN 100 MILLIGRAM(S): 400 CAPSULE ORAL at 06:13

## 2021-10-28 RX ADMIN — CHLORHEXIDINE GLUCONATE 1 APPLICATION(S): 213 SOLUTION TOPICAL at 06:10

## 2021-10-28 RX ADMIN — Medication 25 MILLIGRAM(S): at 17:45

## 2021-10-28 RX ADMIN — APIXABAN 5 MILLIGRAM(S): 2.5 TABLET, FILM COATED ORAL at 06:09

## 2021-10-28 RX ADMIN — Medication 81 MILLIGRAM(S): at 12:00

## 2021-10-28 RX ADMIN — PIPERACILLIN AND TAZOBACTAM 25 GRAM(S): 4; .5 INJECTION, POWDER, LYOPHILIZED, FOR SOLUTION INTRAVENOUS at 13:34

## 2021-10-28 RX ADMIN — Medication 25 MILLIGRAM(S): at 06:09

## 2021-10-28 RX ADMIN — PIPERACILLIN AND TAZOBACTAM 25 GRAM(S): 4; .5 INJECTION, POWDER, LYOPHILIZED, FOR SOLUTION INTRAVENOUS at 06:09

## 2021-10-28 NOTE — PROGRESS NOTE ADULT - SUBJECTIVE AND OBJECTIVE BOX
NP Note discussed with  Primary Attending    INTERVAL HPI/OVERNIGHT EVENTS: no new complaints    MEDICATIONS  (STANDING):  apixaban 5 milliGRAM(s) Oral every 12 hours  aspirin  chewable 81 milliGRAM(s) Oral daily  atorvastatin 80 milliGRAM(s) Oral at bedtime  chlorhexidine 2% Cloths 1 Application(s) Topical <User Schedule>  collagenase Ointment 1 Application(s) Topical daily  cyanocobalamin 1000 MICROGram(s) Oral daily  dextrose 5%. 1000 milliLiter(s) (100 mL/Hr) IV Continuous <Continuous>  ergocalciferol 92471 Unit(s) Oral <User Schedule>  ferrous    sulfate Liquid 300 milliGRAM(s) Enteral Tube daily  finasteride 5 milliGRAM(s) Oral daily  gabapentin 100 milliGRAM(s) Oral three times a day  glucagon  Injectable 1 milliGRAM(s) IntraMuscular once  insulin lispro (ADMELOG) corrective regimen sliding scale   SubCutaneous Before meals and at bedtime  metoprolol tartrate 25 milliGRAM(s) Oral two times a day  NIFEdipine XL 60 milliGRAM(s) Oral daily  pantoprazole  Injectable 40 milliGRAM(s) IV Push at bedtime  piperacillin/tazobactam IVPB.. 3.375 Gram(s) IV Intermittent every 8 hours    MEDICATIONS  (PRN):  acetaminophen   Tablet .. 650 milliGRAM(s) Oral every 6 hours PRN Temp greater or equal to 38C (100.4F), Moderate Pain (4 - 6)  ondansetron Injectable 4 milliGRAM(s) IV Push three times a day PRN Nausea and/or Vomiting  sodium chloride 0.9% lock flush 10 milliLiter(s) IV Push every 1 hour PRN Pre/post blood products, medications, blood draw, and to maintain line patency      __________________________________________________  REVIEW OF SYSTEMS:    CONSTITUTIONAL: No fever,   EYES: no acute visual disturbances  NECK: No pain or stiffness  RESPIRATORY: No cough; No shortness of breath  CARDIOVASCULAR: No chest pain, no palpitations  GASTROINTESTINAL: No pain. No nausea or vomiting; No diarrhea   NEUROLOGICAL: No headache or numbness, no tremors  MUSCULOSKELETAL: No joint pain, no muscle pain  GENITOURINARY: no dysuria, no frequency, no hesitancy  PSYCHIATRY: no depression , no anxiety  ALL OTHER  ROS negative        Vital Signs Last 24 Hrs  T(C): 36.3 (28 Oct 2021 12:30), Max: 36.7 (27 Oct 2021 19:33)  T(F): 97.4 (28 Oct 2021 12:30), Max: 98.1 (27 Oct 2021 19:33)  HR: 74 (28 Oct 2021 12:30) (66 - 79)  BP: 144/68 (28 Oct 2021 12:30) (125/55 - 151/72)  BP(mean): 73 (27 Oct 2021 19:33) (73 - 73)  RR: 17 (28 Oct 2021 12:30) (15 - 18)  SpO2: 100% (28 Oct 2021 12:30) (100% - 100%)    ________________________________________________  PHYSICAL EXAM:  GENERAL: NAD  HEENT: Normocephalic;  conjunctivae and sclerae clear; moist mucous membranes;   NECK : supple  CHEST/LUNG: Clear to auscultation bilaterally with good air entry   HEART: S1 S2  regular; no murmurs, gallops or rubs  ABDOMEN: Soft, Nontender, Nondistended; Bowel sounds present  EXTREMITIES: no cyanosis; no edema; no calf tenderness  SKIN: warm and dry; no rash, Right foot dressing- no c/d/i  NERVOUS SYSTEM:  Awake and alert; Oriented  to place, person and time ; no new deficits    _________________________________________________  LABS:                        9.1    4.88  )-----------( 273      ( 27 Oct 2021 06:23 )             28.0     10-27    140  |  109<H>  |  8   ----------------------------<  117<H>  4.2   |  24  |  1.25    Ca    8.9      27 Oct 2021 06:23          CAPILLARY BLOOD GLUCOSE      POCT Blood Glucose.: 116 mg/dL (28 Oct 2021 11:42)  POCT Blood Glucose.: 121 mg/dL (28 Oct 2021 08:06)  POCT Blood Glucose.: 111 mg/dL (28 Oct 2021 08:05)  POCT Blood Glucose.: 130 mg/dL (27 Oct 2021 21:02)  POCT Blood Glucose.: 150 mg/dL (27 Oct 2021 16:30)        RADIOLOGY & ADDITIONAL TESTS:    Imaging  Reviewed:  YES    Consultant(s) Notes Reviewed:   YES      Plan of care was discussed with patient and /or primary care giver; all questions and concerns were addressed

## 2021-10-28 NOTE — PROGRESS NOTE ADULT - SUBJECTIVE AND OBJECTIVE BOX
C A R D I O L O G Y  **********************************    DATE OF SERVICE: 10-28-21    Patient denies chest pain or shortness of breath.   Review of symptoms otherwise negative.    acetaminophen   Tablet .. 650 milliGRAM(s) Oral every 6 hours PRN  apixaban 5 milliGRAM(s) Oral every 12 hours  aspirin  chewable 81 milliGRAM(s) Oral daily  atorvastatin 80 milliGRAM(s) Oral at bedtime  chlorhexidine 2% Cloths 1 Application(s) Topical <User Schedule>  collagenase Ointment 1 Application(s) Topical daily  cyanocobalamin 1000 MICROGram(s) Oral daily  dextrose 5%. 1000 milliLiter(s) IV Continuous <Continuous>  ergocalciferol 01531 Unit(s) Oral <User Schedule>  ferrous    sulfate Liquid 300 milliGRAM(s) Enteral Tube daily  finasteride 5 milliGRAM(s) Oral daily  gabapentin 100 milliGRAM(s) Oral three times a day  glucagon  Injectable 1 milliGRAM(s) IntraMuscular once  insulin lispro (ADMELOG) corrective regimen sliding scale   SubCutaneous Before meals and at bedtime  metoprolol tartrate 25 milliGRAM(s) Oral two times a day  NIFEdipine XL 60 milliGRAM(s) Oral daily  ondansetron Injectable 4 milliGRAM(s) IV Push three times a day PRN  pantoprazole  Injectable 40 milliGRAM(s) IV Push at bedtime  piperacillin/tazobactam IVPB.. 3.375 Gram(s) IV Intermittent every 8 hours  sodium chloride 0.9% lock flush 10 milliLiter(s) IV Push every 1 hour PRN                            9.1    4.88  )-----------( 273      ( 27 Oct 2021 06:23 )             28.0       Hemoglobin: 9.1 g/dL (10-27 @ 06:23)  Hemoglobin: 9.6 g/dL (10-26 @ 18:41)  Hemoglobin: 9.7 g/dL (10-25 @ 08:37)      10-27    140  |  109<H>  |  8   ----------------------------<  117<H>  4.2   |  24  |  1.25    Ca    8.9      27 Oct 2021 06:23      Creatinine Trend: 1.25<--, 1.17<--, 1.22<--, 1.07<--, 0.84<--, 1.00<--    COAGS:           T(C): 36.3 (10-28-21 @ 12:30), Max: 36.7 (10-27-21 @ 19:33)  HR: 74 (10-28-21 @ 12:30) (66 - 79)  BP: 144/68 (10-28-21 @ 12:30) (125/55 - 151/72)  RR: 17 (10-28-21 @ 12:30) (15 - 18)  SpO2: 100% (10-28-21 @ 12:30) (100% - 100%)  Wt(kg): --    I&O's Summary    27 Oct 2021 07:01  -  28 Oct 2021 07:00  --------------------------------------------------------  IN: 0 mL / OUT: 2250 mL / NET: -2250 mL    28 Oct 2021 07:01  -  28 Oct 2021 16:35  --------------------------------------------------------  IN: 0 mL / OUT: 380 mL / NET: -380 mL          HEENT:  (-)icterus (-)pallor  CV: N S1 S2 1/6 TRINIDAD (+)2 Pulses B/l  Resp:  Coarse sounds B/L, normal effort  GI: (+) BS Soft, NT, ND  Lymph:  (-)Edema, (-)obvious lymphadenopathy  Skin: Warm to touch, Normal turgor  Psych:  appropriate mood and affect        ASSESSMENT/PLAN: 	76y  Male PMH DM on insulin, HTN, HLD, normal lV fx moderate to severe AS 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    - crt improved  -  complete course of Abx per ID   - Echo noted, Severe AS, EF 40-45%   - He will need a SABIHA and R+L heart cath when he recovers and has no active infection  - Cont medical management of CAD  - Pt. with new onset PAF now on Eliquis  - Pig tail removed on 10/16      Zak Wilkins MD, [facility]  BEEPER [phone]

## 2021-10-28 NOTE — PROGRESS NOTE ADULT - ASSESSMENT
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 Of note, the patient never followed up with podiatry after his procedure in August. MRI confirms suspected osteomyelitis - patient followed by podiatry and ID, patient underwent debridement and wound VAC placement and removed in OR, was treated with Unasyn and Levofloxacin, ID. Surgical pathology resulted OM, wound culture resulting Enterococcus Aeromonal and Klebsiella- treated initially with Zosyn however hospital course complicated with OREN likely mixed etiology with prerenal from active infection and pulmonary edema, intrarenal due to toxicity from IV antibiotics and post renal from urinary retention, Nephrology consulted for optimization of kidney function. IV antibiotic regimen switched to Meropenem, sensitive for organism, will need 6 weeks of IV antibiotics via PICC, until 11/3. IR guided PICC placed to Left arm.  Patient subsequently treated for Pulmonary Edema with IV lasix, Cardiology recommending SABIHA with L/R heart cath once infection clears and medically stable. Hospital course further complicated by abdominal distention and constipation for which he received lactulose and vomited. The patient had subsequent respiratory distress and it is suspected he aspirated as CXR showed possible RLL PNA. AXR shows distended loops of bowel for which NG tube was placed which the patient removed overnight 9/26 - re-attempts to replace NG tube were unsuccessful as patient was non-cooperative. Surgery consulted. CT abdomen deferred for now as patient is unstable and will not tolerate lying flat. ICU consulted given patient's deterioration in clinical status.  Patient was transferred to ICU, mechanically intubated and extubated for AHRF secondary to acute on CHF. Right pigtail placed for pleural effusion, and removed when resolved by Thoracic surgery. Patient stable and transferred to medicine floor. Patient however exposed to COVID, and being monitored for conversion. PT recommending STEVAN.  Will need to repeat COVID PCR before D/C. likely on 10/30.

## 2021-10-28 NOTE — CHART NOTE - NSCHARTNOTEFT_GEN_A_CORE
Reassessment:   76yMalePatient is a 76y old  Male who presents with a chief complaint of Diabetic foot ulcer with OM (28 Oct 2021 14:50)      Factors impacting intake: [ ] none [ ] nausea  [ ] vomiting [ ] diarrhea [ ] constipation  [ ]chewing problems [ ] swallowing issues  [X ] other: COVID exposure on 10/21     Diet Prescription: Diet, Consistent Carbohydrate Renal w/Evening Snack:   Soft  Supplement Feeding Modality:  Oral  Nepro Cans or Servings Per Day:  1       Frequency:  Two Times a day (09-30-21 @ 19:02)    Intake: Patient on airborne precaution, visited, reports "likes lunch meal today" intake ~50% & tolerating, appetite improving, provided food choices, updated kitchen/Diet Tech, Nursing to continue feeding assistance and encouragement, aspiration precaution, rec. Soft & Bite Size diet, also Add Glucerna Shake 1can bid (440kcal, 20g protein) as medically feasible, pending STEVAN placement. RD available.    Daily weight: 171.5 Lbs on 10/16/21  % Weight Change: Nursing to monitor daily weights     Pertinent Medications: MEDICATIONS  (STANDING):  apixaban 5 milliGRAM(s) Oral every 12 hours  aspirin  chewable 81 milliGRAM(s) Oral daily  atorvastatin 80 milliGRAM(s) Oral at bedtime  chlorhexidine 2% Cloths 1 Application(s) Topical <User Schedule>  collagenase Ointment 1 Application(s) Topical daily  cyanocobalamin 1000 MICROGram(s) Oral daily  dextrose 5%. 1000 milliLiter(s) (100 mL/Hr) IV Continuous <Continuous>  ergocalciferol 07793 Unit(s) Oral <User Schedule>  ferrous    sulfate Liquid 300 milliGRAM(s) Enteral Tube daily  finasteride 5 milliGRAM(s) Oral daily  gabapentin 100 milliGRAM(s) Oral three times a day  glucagon  Injectable 1 milliGRAM(s) IntraMuscular once  insulin lispro (ADMELOG) corrective regimen sliding scale   SubCutaneous Before meals and at bedtime  metoprolol tartrate 25 milliGRAM(s) Oral two times a day  NIFEdipine XL 60 milliGRAM(s) Oral daily  pantoprazole  Injectable 40 milliGRAM(s) IV Push at bedtime  piperacillin/tazobactam IVPB.. 3.375 Gram(s) IV Intermittent every 8 hours    MEDICATIONS  (PRN):  acetaminophen   Tablet .. 650 milliGRAM(s) Oral every 6 hours PRN Temp greater or equal to 38C (100.4F), Moderate Pain (4 - 6)  ondansetron Injectable 4 milliGRAM(s) IV Push three times a day PRN Nausea and/or Vomiting  sodium chloride 0.9% lock flush 10 milliLiter(s) IV Push every 1 hour PRN Pre/post blood products, medications, blood draw, and to maintain line patency    Pertinent Labs: 10-27 Na140 mmol/L Glu 117 mg/dL<H> K+ 4.2 mmol/L Cr  1.25 mg/dL BUN 8 mg/dL 10-22 Phos 3.5 mg/dL 10-25 Alb 2.6 g/dL<L>     CAPILLARY BLOOD GLUCOSE      POCT Blood Glucose.: 116 mg/dL (28 Oct 2021 11:42)  POCT Blood Glucose.: 121 mg/dL (28 Oct 2021 08:06)  POCT Blood Glucose.: 111 mg/dL (28 Oct 2021 08:05)  POCT Blood Glucose.: 130 mg/dL (27 Oct 2021 21:02)  POCT Blood Glucose.: 150 mg/dL (27 Oct 2021 16:30)    Skin: ongoing wound care     Estimated Needs:   [X ] no change since previous assessment  [ ] recalculated:     Previous Nutrition Diagnosis:   [ ] Inadequate Energy Intake [X ]Inadequate Oral Intake [ ] Excessive Energy Intake   [ ] Underweight [ ] Increased Nutrient Needs [ ] Overweight/Obesity   [ ] Altered GI Function [ ] Unintended Weight Loss [ ] Food & Nutrition Related Knowledge Deficit [ ] Malnutrition     Nutrition Diagnosis is [X ] ongoing  [ ] resolved [ ] not applicable     New Nutrition Diagnosis: [ ] not applicable     Interventions: To meet nutrition needs     Recommend  [ ] Change Diet To:  [ ] Nutrition Supplement  [ ] Nutrition Support  [X ] Other: Nursing to continue feeding assistance and encouragement, aspiration precaution     Monitoring and Evaluation:   [ X] PO intake [ x ] Tolerance to diet prescription [ x ] weights [ x ] labs[ x ] follow up per protocol  [ ] other:

## 2021-10-28 NOTE — PROGRESS NOTE ADULT - SUBJECTIVE AND OBJECTIVE BOX
Patient is a 76y old  Male who presents with a chief complaint of R Foot Pain (27 Oct 2021 16:06)    PATIENT IS SEEN AND EXAMINED IN MEDICAL FLOOR.  KEENANT [    ]    MADDY [   ]      GT [   ]    ALLERGIES:  No Known Allergies      Daily     Daily     VITALS:    Vital Signs Last 24 Hrs  T(C): 36.3 (28 Oct 2021 12:30), Max: 36.7 (27 Oct 2021 19:33)  T(F): 97.4 (28 Oct 2021 12:30), Max: 98.1 (27 Oct 2021 19:33)  HR: 74 (28 Oct 2021 12:30) (66 - 79)  BP: 144/- (28 Oct 2021 12:30) (125/55 - 151/72)  BP(mean): 73 (27 Oct 2021 19:33) (73 - 73)  RR: 17 (28 Oct 2021 12:30) (15 - 18)  SpO2: 100% (28 Oct 2021 12:30) (100% - 100%)    LABS:    CBC Full  -  ( 27 Oct 2021 06:23 )  WBC Count : 4.88 K/uL  RBC Count : 3.09 M/uL  Hemoglobin : 9.1 g/dL  Hematocrit : 28.0 %  Platelet Count - Automated : 273 K/uL  Mean Cell Volume : 90.6 fl  Mean Cell Hemoglobin : 29.4 pg  Mean Cell Hemoglobin Concentration : 32.5 gm/dL  Auto Neutrophil # : x  Auto Lymphocyte # : x  Auto Monocyte # : x  Auto Eosinophil # : x  Auto Basophil # : x  Auto Neutrophil % : x  Auto Lymphocyte % : x  Auto Monocyte % : x  Auto Eosinophil % : x  Auto Basophil % : x      10-27    140  |  109<H>  |  8   ----------------------------<  117<H>  4.2   |  24  |  1.25    Ca    8.9      27 Oct 2021 06:23      CAPILLARY BLOOD GLUCOSE      POCT Blood Glucose.: 116 mg/dL (28 Oct 2021 11:42)  POCT Blood Glucose.: 121 mg/dL (28 Oct 2021 08:06)  POCT Blood Glucose.: 111 mg/dL (28 Oct 2021 08:05)  POCT Blood Glucose.: 130 mg/dL (27 Oct 2021 21:02)  POCT Blood Glucose.: 150 mg/dL (27 Oct 2021 16:30)          Creatinine Trend: 1.25<--, 1.17<--, 1.22<--, 1.07<--, 0.84<--, 1.00<--  I&O's Summary    27 Oct 2021 07:01  -  28 Oct 2021 07:00  --------------------------------------------------------  IN: 0 mL / OUT: 2250 mL / NET: -2250 mL            .Body Fluid Pleural Fluid  10-08 @ 18:51   No growth at 1 week.  --  --      .Body Fluid Pleural Fluid  10-08 @ 18:34   No growth at 5 days  --    polymorphonuclear leukocytes seen  No organisms seen  by cytocentrifuge      .Tissue Right 5th toe r/o osteomyeltis  09-22 @ 13:54   No growth at 5 days  --    No polymorphonuclear cells seen per low power field  No organisms seen per oil power field      .Blood Blood-Venous  09-17 @ 10:28   No Growth Final  --  --      .Surgical Swab right foot wound  09-16 @ 21:42   Culture yields >4 types of aerobic and/or anaerobic bacteria  Call client services within 7 days if further workup is clinically  indicated. Culture includes  Rare Aeromonas hydrophila/caviae  Few Klebsiella oxytoca/Raoutella ornithinolytica  Few Enterococcus faecalis  Few Streptococcus agalactiae (Group B) isolated  Group B streptococci are susceptible to ampicillin,  penicillin and cefazolin, but may be resistant to  erythromycin and clindamycin.  Recommendations for intrapartum prophylaxis for Group B  streptococci are penicillin or ampicillin.  --  Aeromonas hydrophila/caviae  Klebsiella oxytoca /Raoutella ornithinolytica  Enterococcus faecalis      Bone R 5th metatarsal bone clean ma  08-20 @ 03:59   No growth at 5 days  --    No polymorphonuclear leukocytes seen per low power field  No organisms seen per oil power field      .Blood Blood-Venous  08-14 @ 21:09   No Growth Final  --  --      .Surgical Swab Right foot plantar wound  08-14 @ 21:08   Moderate Streptococcus agalactiae (Group B) isolated  Group B streptococci are susceptible to ampicillin,  penicillin and cefazolin, but may be resistant to  erythromycin and clindamycin.  Recommendations for intrapartum prophylaxis for Group B  streptococci are penicillin or ampicillin.  Moderate Bacteroides fragilis "Susceptibilities not performed"  Normal skin filiberto isolated  --  --          MEDICATIONS:    MEDICATIONS  (STANDING):  apixaban 5 milliGRAM(s) Oral every 12 hours  aspirin  chewable 81 milliGRAM(s) Oral daily  atorvastatin 80 milliGRAM(s) Oral at bedtime  chlorhexidine 2% Cloths 1 Application(s) Topical <User Schedule>  collagenase Ointment 1 Application(s) Topical daily  cyanocobalamin 1000 MICROGram(s) Oral daily  dextrose 5%. 1000 milliLiter(s) (100 mL/Hr) IV Continuous <Continuous>  ergocalciferol 14914 Unit(s) Oral <User Schedule>  ferrous    sulfate Liquid 300 milliGRAM(s) Enteral Tube daily  finasteride 5 milliGRAM(s) Oral daily  gabapentin 100 milliGRAM(s) Oral three times a day  glucagon  Injectable 1 milliGRAM(s) IntraMuscular once  insulin lispro (ADMELOG) corrective regimen sliding scale   SubCutaneous Before meals and at bedtime  metoprolol tartrate 25 milliGRAM(s) Oral two times a day  NIFEdipine XL 60 milliGRAM(s) Oral daily  pantoprazole  Injectable 40 milliGRAM(s) IV Push at bedtime  piperacillin/tazobactam IVPB.. 3.375 Gram(s) IV Intermittent every 8 hours      MEDICATIONS  (PRN):  acetaminophen   Tablet .. 650 milliGRAM(s) Oral every 6 hours PRN Temp greater or equal to 38C (100.4F), Moderate Pain (4 - 6)  ondansetron Injectable 4 milliGRAM(s) IV Push three times a day PRN Nausea and/or Vomiting  sodium chloride 0.9% lock flush 10 milliLiter(s) IV Push every 1 hour PRN Pre/post blood products, medications, blood draw, and to maintain line patency      REVIEW OF SYSTEMS:                           ALL ROS DONE [ X   ]    CONSTITUTIONAL:  LETHARGIC [   ], FEVER [   ], UNRESPONSIVE [   ]  CVS:  CP  [   ], SOB, [   ], PALPITATIONS [   ], DIZZYNESS [   ]  RS: COUGH [   ], SPUTUM [   ]  GI: ABDOMINAL PAIN [   ], NAUSEA [   ], VOMITINGS [   ], DIARRHEA [   ], CONSTIPATION [   ]  :  DYSURIA [   ], NOCTURIA [   ], INCREASED FREQUENCY [   ], DRIBLING [   ],  SKELETAL: PAINFUL JOINTS [   ], SWOLLEN JOINTS [   ], NECK ACHE [   ], LOW BACK ACHE [   ],  SKIN : ULCERS [   ], RASH [   ], ITCHING [   ]  CNS: HEAD ACHE [   ], DOUBLE VISION [   ], BLURRED VISION [   ], AMS / CONFUSION [   ], SEIZURES [   ], WEAKNESS [   ],TINGLING / NUMBNESS [   ]    PHYSICAL EXAMINATION:    GENERAL APPEARANCE: NO DISTRESS  HEENT:  NO PALLOR, NO  JVD,  NO   NODES, NECK SUPPLE  CVS: S1 +, S2 +,   RS: AEEB,  OCCASIONAL  RALES +,  RONCHI +  ABD: SOFT, NT, NO, BS +       EXT: NO PE  SKIN: WARM,   RIGHT FOOT WRAPPED  SKELETAL:  ROM ACCEPTABLE  CNS:  AAO X  2-3        RADIOLOGY :    < from: Transthoracic Echocardiogram (10.07.21 @ 15:26) >  CONCLUSIONS:  1. Normal mitral valve. Moderate mitral regurgitation.  2. Calcified aortic valve with decreased opening, cannot  exclude bicuspid. Peak transaortic valve gradient equals  32.4 mm Hg, mean transaortic valve gradient equals 19 mm  Hg, dimensionless index 0.22, estimated aortic valve area  equals 0.6sqcm (by continuity equation), consistent with  paradoxical low-flow low-gradient severe aortic stenosis.  Consider dobutamine stress echocardiogram and/or SABIHA for  further evaluation.  No aortic valve regurgitation seen.  3. Normal aortic root.  4. Normal left atrium.  5. Moderate concentric left ventricular hypertrophy.  6. Moderate global left ventricular systolic dysfunction  (EF 40-45% by visual estimation).  7. Grade II diastolic dysfunction (moderate).  8. Normal right atrium.  9. Normal right ventricular size with decreased RV systolic  function (TAPSE 1.2 cm).  10. RV systolic pressure is borderline normal at  34 mm Hg.  11. Tricuspid valve not well seen. Trace tricuspid  regurgitation.  12. Normal pulmonic valve. Trace pulmonic insufficiency is  noted.  13. No pericardial effusion.  14. Bilateral pleural effusions.    < end of copied text >      EXAM:  CT ABDOMEN AND PELVIS OC                            PROCEDURE DATE:  09/27/2021          INTERPRETATION:  CLINICAL INFORMATION: Small bowel obstruction.    COMPARISON: None.    CONTRAST/COMPLICATIONS:  IV Contrast: NONE  Oral Contrast: Omnipaque 300  Complications: None reported at time of study completion    PROCEDURE:  CT of the Abdomen and Pelvis was performed.  Sagittal and coronal reformats were performed.    FINDINGS:  LOWER CHEST: Small bilateral pleural effusions with compressiveatelectasis of both lower lobes.    LIVER: Within normal limits.  BILE DUCTS: Normal caliber.  GALLBLADDER: Distended. Small layering gallstones.  SPLEEN: Within normal limits.  PANCREAS: Within normal limits.  ADRENALS: Within normal limits.  KIDNEYS/URETERS: No hydronephrosis. Nonobstructing left upper pole calculus, measuring 0.6 cm.    BLADDER: Urinary bladder contains air and a Castañeda catheter balloon.  REPRODUCTIVE ORGANS: Prostate is not enlarged.    BOWEL: No bowel obstruction. Appendix isnormal. Colonic diverticulosis.  PERITONEUM: Mild presacral fluid.  VESSELS: Atherosclerotic changes.  RETROPERITONEUM/LYMPH NODES: No lymphadenopathy.  ABDOMINAL WALL: Within normal limits.  BONES: Degenerative changes. Sternotomy.    IMPRESSION:  No acute intra-abdominal pathology.    Small bilateral pleural effusions with compressive atelectasis of both lower lobes.    ====================================================    EXAM:  MR FOOT RT                            PROCEDURE DATE:  09/17/2021          INTERPRETATION:  Clinical Information: Recent fifth metatarsal head resection now with fifth digit pain, swelling and foul discharge.    Comparison: Radiographs of the right foot from 9/16/2021 and MRI the right foot from 8/16/2021.    Technique:  MRI of the right midfoot and forefoot.  Intravenous Contrast: None.    Findings:    There is a resection at the mid aspect of the fifth metatarsal shaft. There is a lateral soft tissue wound beginning at the level of the amputation which extends distally. There is susceptibility artifact in the region of the amputation consistent with postoperative change. There is hyperintense T2 marrow signal throughout the remaining fifth metatarsal and within the adjacent fourth metatarsal head which is nonspecific and could be related to recent postoperative changes although osteomyelitis is suspected.    There is edema and mild atrophy within the plantar muscles of the foot. There is minimal spurring at the first metatarsophalangeal and hallux sesamoid articulations.    Impression:  Resection at the mid aspect of the fifth metatarsal. Lateral soft tissue wound with marrow signal abnormality throughout the fifth metatarsal and within the adjacent fourth metatarsal head which is nonspecific in the setting of recent surgery although osteomyelitis is suspected.        ASSESSMENT :     Headache    HTN (hypertension)    HLD (hyperlipidemia)    DM (diabetes mellitus)    CAD (coronary artery disease)    Glaucoma, angle-closure    [place] (hard of hearing)      S/P CABG (coronary artery bypass graft)    History of thyroid surgery        PLAN:    HPI:  76M from home, lives with daughter w/ PMH DM on insulin, HTN, HLD, CAD, 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 Pt with sharp pain on the R lateral foot. As per daughter, pt was taking tylenol which did not help his pain prompting the patient to ask his daughter to take him to the hospital. Pt is a poor historian. Daughter states that the patient did not see his podiatrist after the surgery. No fever, chest, pain, palpitations, nausea, vomiting, diarrhea (17 Sep 2021 01:11)    # CASE D/W PATIENT CARE TEAM CHERELLE DIAL TO HELP ASSIST WITH PATIENT AND FAMILY'S NEEDS AT THIS TIME     # PATIENT IS S/P ICU MONITORING AND RIGHT CHEST TUBE REMOVAL ON 10/15/2021     # COVID EXPOSURE ON 10/13 AND ONCE MORE HAD RE-EXPOSURE ON 10/22 - INFECTION CONTROL IS AWARE AND ADDRESSING - ON CONTACT AND DROPLET ISOLATION PRECAUTION; F/U COVID PCR    # SUSPECT RIGHT FOOT OSTEOMYELITIS S/P RIGHT 5TH RAY RESECTION [8/19] AND S/P DEBRIDEMENT, GRAFT APPLICATION, BONE BX AND WOUND VAC APPLICATION 9/22 - BONE BX W/ ACUTE OSTEOMYELITIS AND NECROTIC PERIOSTEAL TISSUE  ** RECENT ADMISSION FOR RIGHT DIABETIC FOOT ULCER, CELLULITIS, OSTEOMYELITIS RIGHT 5th METATARSAL S/P RIGHT 5TH PARTIAL RAY AMPUTATION [8/20] - BONE PATHOLOGY W/ CLEAN MARGINS    - S/P VANCOMYCIN AND ZOSYN ; NOW ON UNASYN AND LEVAQUIN GIVEN OREN; PER ID SWITCH TO ZOSYN UNTIL 11/3   - RECENTLY HAD SIMON W/ MILD PAD  - REVIEWED WOUND CX [ ENTEROCOCCUS, AEROMONAS, KLEBSIELLA] AND BCX  - ID CONSULT, PODIATRY CONSULT    - PICC LINE PLACED TO COMPLETE 6 WEEKS OF ANTIBIOTICS - ON ZOSYN UNTIL 11/3 ON D/C    # ACUTE HYPOXIC RESPIRATORY FAILURE DUE TO ACUTE ON CHRONIC SYSTOLIC CHF  (  LV EF 40- 45 % ) , DIASTOLIC LV DYSFUNCTION , ,  SEVERE AORTIC STENOSIS & MODERATE MITRAL REGURGITATION  &  ASPIRATION PNA;  - S/P CHEST TUBE INSERTION AND REMOVAL  , S/P LASIX ,   CARDIOLOGY CONSULT IN PROGRESS      - CHEST TUBE PLACED [10/8] - FLUID CONSISTENT WITH TRANSUDATIVE PROCESS  - S/P EXTUBATION 10/13  - S/P CHEST TUBE REMOVAL 10/15      # SEPTIC SHOCK - ? S/T HYPOVOLEMIA VS. SEPSIS - S/P CVC [SWITCHED FROM FEMORAL TO LEFT IJ], S/P VASOPRESSORS - RESOLVING  - BCX - NGTD  - ON MEROPENEM      # TRANSIENT NEW ONSET A.FIB, PAROXYSMAL - ON ELIQUIS, CARDIOLOGY CONSULT IN PROGRESS    # FUNGURIA - D/C FLUCONAZOLE GIVEN TRANSAMINITIS    # TRANSAMINITIS - SUSPECT THIS IS ISCHEMIC HEPATITIS - IMPROVING - HEPATOLOGY CONSULT IN PROGRESS    # BOWEL DISTENTION - ? ILEUS - OPTIMIZING ELECTROLYTES - IMPROVED  - SURGERY CONSULT IN PROGRESS    # CHOLELITHIASIS - TRENDING LFTS, RUQ U/S - CHOLELITHIASIS, S/P SURGERY CONSULT   - S/P GI CONSULT - LESS SUSPICIOUS FOR CHOLECYSTITIS    # DYSPEPSIA - PLACED ON PPI BID WITH IMPROVEMENT, UNDERWENT ST EVAL     # ELEVATED TROPONINS - NSTEMI - ? DEMAND - WILL NEED ISCHEMIC EVAL ONCE ACUTE INFECTION RESOLVES PER CARDIOLOGY, CARDIOLOGY CONSULT IN PROGRESS  - ON ASA, STATIN, BB    # OREN ON CKD - S/T ATN AND URINARY RETENTION - S/P IVF, NOW W/ CASTAÑEDA, NEPHROLOGY CONUSLT IN PROGRESS  -  BPH ON FLOMAX, AND FINASTERIDE  - FAILED TOV WITH WORSENING RENAL FXN - NOTED URINARY RETENTION [10/4] - REPLACED CASTAÑEDA  - TOV 10/18 - BLADDER SCAN BID TO EVALUATE FOR URINARY RETENTION - FAILED TOV  - OVERNIGHT 10/18 - CASTAÑEDA REPLACED    # MEDICAL CLEARANCE FOR SURGERY - RCRI - 2 - 10.1 % 30 DAY RISK OF DEATH, MI OR CARDIAC ARREST  - CARDIOLOGY CONSULT     # DM -  HBA1C - 11  - LANTUS, SSI + FS    # CAD S/P CABG - PLACED ON ASA, STATIN, BB  - ECHO - SEVERE AORTIC STENOSIS, SEVERE CONCENTRIC LVH, G1DD, MILD CA  - CARDIOLOGY CONSULT - DR. BASSETT   - MAY NEED ISCHEMIC EVAL ONCE ACUTE ILLNESS RESOLVES INCLUDING CARDIAC CATHETERIZATION    # HTN, HLD  - ON METOPROLOL, NIFEDIPINE, STATIN       #  IMPAIRED GAIT DUE TO CERVICAL & LS SPONDYLOSIS, POLY ARTHRITIS AND DIABETIC PERIPHERAL NEUROPATHY, OP VITAMIN D DEFICIENCY - ON CHOLECALCIFEROL, OBTAIN PT & OT   #  FAILURE TO THRIVE , MODERATE PROTEIN CALORIE MALNUTRITION       # CASE DISCUSSED AT LENGTH WITH PATIENT AND DAUGHTER, BIRDIE ROBERT VIA PHONE @ [phone] [10/25] - CASE DICUSSED AT LENGTH AND ALL QUESTIONS WERE ANSWERED. DAUGHTER UNDERSTOOD THAT PROGNOSIS IS GUARDED.  # PATIENT AND DAUGHTER REFUSED [facility] ON PREVIOUS ADMISSION, PREVIOUSLY WISHED FOR PATIENT RETURN HOME W/ HOME PT; HOWEVER NOW, DAUGHTER WISHES FOR PATIENT TO GO TO [facility], AS PATIENT'S WIFE IS CURRENTLY ADMITTED THERE    # GI AND DVT PPX     Patient is a 76y old  Male who presents with a chief complaint of R Foot Pain (27 Oct 2021 16:06)    PATIENT IS SEEN AND EXAMINED IN MEDICAL FLOOR.    ALLERGIES:  No Known Allergies    VITALS:    Vital Signs Last 24 Hrs  T(C): 36.3 (28 Oct 2021 12:30), Max: 36.7 (27 Oct 2021 19:33)  T(F): 97.4 (28 Oct 2021 12:30), Max: 98.1 (27 Oct 2021 19:33)  HR: 74 (28 Oct 2021 12:30) (66 - 79)  BP: 144/- (28 Oct 2021 12:30) (125/55 - 151/72)  BP(mean): 73 (27 Oct 2021 19:33) (73 - 73)  RR: 17 (28 Oct 2021 12:30) (15 - 18)  SpO2: 100% (28 Oct 2021 12:30) (100% - 100%)    LABS:    CBC Full  -  ( 27 Oct 2021 06:23 )  WBC Count : 4.88 K/uL  RBC Count : 3.09 M/uL  Hemoglobin : 9.1 g/dL  Hematocrit : 28.0 %  Platelet Count - Automated : 273 K/uL  Mean Cell Volume : 90.6 fl  Mean Cell Hemoglobin : 29.4 pg  Mean Cell Hemoglobin Concentration : 32.5 gm/dL  Auto Neutrophil # : x  Auto Lymphocyte # : x  Auto Monocyte # : x  Auto Eosinophil # : x  Auto Basophil # : x  Auto Neutrophil % : x  Auto Lymphocyte % : x  Auto Monocyte % : x  Auto Eosinophil % : x  Auto Basophil % : x      10-27    140  |  109<H>  |  8   ----------------------------<  117<H>  4.2   |  24  |  1.25    Ca    8.9      27 Oct 2021 06:23      CAPILLARY BLOOD GLUCOSE      POCT Blood Glucose.: 116 mg/dL (28 Oct 2021 11:42)  POCT Blood Glucose.: 121 mg/dL (28 Oct 2021 08:06)  POCT Blood Glucose.: 111 mg/dL (28 Oct 2021 08:05)  POCT Blood Glucose.: 130 mg/dL (27 Oct 2021 21:02)  POCT Blood Glucose.: 150 mg/dL (27 Oct 2021 16:30)          Creatinine Trend: 1.25<--, 1.17<--, 1.22<--, 1.07<--, 0.84<--, 1.00<--  I&O's Summary    27 Oct 2021 07:01  -  28 Oct 2021 07:00  --------------------------------------------------------  IN: 0 mL / OUT: 2250 mL / NET: -2250 mL            .Body Fluid Pleural Fluid  10-08 @ 18:51   No growth at 1 week.  --  --      .Body Fluid Pleural Fluid  10-08 @ 18:34   No growth at 5 days  --    polymorphonuclear leukocytes seen  No organisms seen  by cytocentrifuge      .Tissue Right 5th toe r/o osteomyeltis  09-22 @ 13:54   No growth at 5 days  --    No polymorphonuclear cells seen per low power field  No organisms seen per oil power field      .Blood Blood-Venous  09-17 @ 10:28   No Growth Final  --  --      .Surgical Swab right foot wound  09-16 @ 21:42   Culture yields >4 types of aerobic and/or anaerobic bacteria  Call client services within 7 days if further workup is clinically  indicated. Culture includes  Rare Aeromonas hydrophila/caviae  Few Klebsiella oxytoca/Raoutella ornithinolytica  Few Enterococcus faecalis  Few Streptococcus agalactiae (Group B) isolated  Group B streptococci are susceptible to ampicillin,  penicillin and cefazolin, but may be resistant to  erythromycin and clindamycin.  Recommendations for intrapartum prophylaxis for Group B  streptococci are penicillin or ampicillin.  --  Aeromonas hydrophila/caviae  Klebsiella oxytoca /Raoutella ornithinolytica  Enterococcus faecalis      Bone R 5th metatarsal bone clean ma  08-20 @ 03:59   No growth at 5 days  --    No polymorphonuclear leukocytes seen per low power field  No organisms seen per oil power field      .Blood Blood-Venous  08-14 @ 21:09   No Growth Final  --  --      .Surgical Swab Right foot plantar wound  08-14 @ 21:08   Moderate Streptococcus agalactiae (Group B) isolated  Group B streptococci are susceptible to ampicillin,  penicillin and cefazolin, but may be resistant to  erythromycin and clindamycin.  Recommendations for intrapartum prophylaxis for Group B  streptococci are penicillin or ampicillin.  Moderate Bacteroides fragilis "Susceptibilities not performed"  Normal skin filiberto isolated  --  --          MEDICATIONS:    MEDICATIONS  (STANDING):  apixaban 5 milliGRAM(s) Oral every 12 hours  aspirin  chewable 81 milliGRAM(s) Oral daily  atorvastatin 80 milliGRAM(s) Oral at bedtime  chlorhexidine 2% Cloths 1 Application(s) Topical <User Schedule>  collagenase Ointment 1 Application(s) Topical daily  cyanocobalamin 1000 MICROGram(s) Oral daily  dextrose 5%. 1000 milliLiter(s) (100 mL/Hr) IV Continuous <Continuous>  ergocalciferol 52021 Unit(s) Oral <User Schedule>  ferrous    sulfate Liquid 300 milliGRAM(s) Enteral Tube daily  finasteride 5 milliGRAM(s) Oral daily  gabapentin 100 milliGRAM(s) Oral three times a day  glucagon  Injectable 1 milliGRAM(s) IntraMuscular once  insulin lispro (ADMELOG) corrective regimen sliding scale   SubCutaneous Before meals and at bedtime  metoprolol tartrate 25 milliGRAM(s) Oral two times a day  NIFEdipine XL 60 milliGRAM(s) Oral daily  pantoprazole  Injectable 40 milliGRAM(s) IV Push at bedtime  piperacillin/tazobactam IVPB.. 3.375 Gram(s) IV Intermittent every 8 hours      MEDICATIONS  (PRN):  acetaminophen   Tablet .. 650 milliGRAM(s) Oral every 6 hours PRN Temp greater or equal to 38C (100.4F), Moderate Pain (4 - 6)  ondansetron Injectable 4 milliGRAM(s) IV Push three times a day PRN Nausea and/or Vomiting  sodium chloride 0.9% lock flush 10 milliLiter(s) IV Push every 1 hour PRN Pre/post blood products, medications, blood draw, and to maintain line patency      REVIEW OF SYSTEMS:                           ALL ROS DONE [ X   ]    CONSTITUTIONAL:  LETHARGIC [   ], FEVER [   ], UNRESPONSIVE [   ]  CVS:  CP  [   ], SOB, [   ], PALPITATIONS [   ], DIZZYNESS [   ]  RS: COUGH [   ], SPUTUM [   ]  GI: ABDOMINAL PAIN [   ], NAUSEA [   ], VOMITINGS [   ], DIARRHEA [   ], CONSTIPATION [   ]  :  DYSURIA [   ], NOCTURIA [   ], INCREASED FREQUENCY [   ], DRIBLING [   ],  SKELETAL: PAINFUL JOINTS [   ], SWOLLEN JOINTS [   ], NECK ACHE [   ], LOW BACK ACHE [   ],  SKIN : ULCERS [   ], RASH [   ], ITCHING [   ]  CNS: HEAD ACHE [   ], DOUBLE VISION [   ], BLURRED VISION [   ], AMS / CONFUSION [   ], SEIZURES [   ], WEAKNESS [   ],TINGLING / NUMBNESS [   ]    PHYSICAL EXAMINATION:    GENERAL APPEARANCE: NO DISTRESS  HEENT:  NO PALLOR, NO  JVD,  NO   NODES, NECK SUPPLE  CVS: S1 +, S2 +,   RS: AEEB,  OCCASIONAL  RALES +,  RONCHI +  ABD: SOFT, NT, NO, BS +       EXT: NO PE  SKIN: WARM,   RIGHT FOOT WRAPPED  SKELETAL:  ROM ACCEPTABLE  CNS:  AAO X  2-3        RADIOLOGY :    < from: Transthoracic Echocardiogram (10.07.21 @ 15:26) >  CONCLUSIONS:  1. Normal mitral valve. Moderate mitral regurgitation.  2. Calcified aortic valve with decreased opening, cannot  exclude bicuspid. Peak transaortic valve gradient equals  32.4 mm Hg, mean transaortic valve gradient equals 19 mm  Hg, dimensionless index 0.22, estimated aortic valve area  equals 0.6sqcm (by continuity equation), consistent with  paradoxical low-flow low-gradient severe aortic stenosis.  Consider dobutamine stress echocardiogram and/or SABIHA for  further evaluation.  No aortic valve regurgitation seen.  3. Normal aortic root.  4. Normal left atrium.  5. Moderate concentric left ventricular hypertrophy.  6. Moderate global left ventricular systolic dysfunction  (EF 40-45% by visual estimation).  7. Grade II diastolic dysfunction (moderate).  8. Normal right atrium.  9. Normal right ventricular size with decreased RV systolic  function (TAPSE 1.2 cm).  10. RV systolic pressure is borderline normal at  34 mm Hg.  11. Tricuspid valve not well seen. Trace tricuspid  regurgitation.  12. Normal pulmonic valve. Trace pulmonic insufficiency is  noted.  13. No pericardial effusion.  14. Bilateral pleural effusions.    < end of copied text >      EXAM:  CT ABDOMEN AND PELVIS OC                            PROCEDURE DATE:  09/27/2021          INTERPRETATION:  CLINICAL INFORMATION: Small bowel obstruction.    COMPARISON: None.    CONTRAST/COMPLICATIONS:  IV Contrast: NONE  Oral Contrast: Omnipaque 300  Complications: None reported at time of study completion    PROCEDURE:  CT of the Abdomen and Pelvis was performed.  Sagittal and coronal reformats were performed.    FINDINGS:  LOWER CHEST: Small bilateral pleural effusions with compressiveatelectasis of both lower lobes.    LIVER: Within normal limits.  BILE DUCTS: Normal caliber.  GALLBLADDER: Distended. Small layering gallstones.  SPLEEN: Within normal limits.  PANCREAS: Within normal limits.  ADRENALS: Within normal limits.  KIDNEYS/URETERS: No hydronephrosis. Nonobstructing left upper pole calculus, measuring 0.6 cm.    BLADDER: Urinary bladder contains air and a Castañeda catheter balloon.  REPRODUCTIVE ORGANS: Prostate is not enlarged.    BOWEL: No bowel obstruction. Appendix isnormal. Colonic diverticulosis.  PERITONEUM: Mild presacral fluid.  VESSELS: Atherosclerotic changes.  RETROPERITONEUM/LYMPH NODES: No lymphadenopathy.  ABDOMINAL WALL: Within normal limits.  BONES: Degenerative changes. Sternotomy.    IMPRESSION:  No acute intra-abdominal pathology.    Small bilateral pleural effusions with compressive atelectasis of both lower lobes.    ====================================================    EXAM:  MR FOOT RT                            PROCEDURE DATE:  09/17/2021          INTERPRETATION:  Clinical Information: Recent fifth metatarsal head resection now with fifth digit pain, swelling and foul discharge.    Comparison: Radiographs of the right foot from 9/16/2021 and MRI the right foot from 8/16/2021.    Technique:  MRI of the right midfoot and forefoot.  Intravenous Contrast: None.    Findings:    There is a resection at the mid aspect of the fifth metatarsal shaft. There is a lateral soft tissue wound beginning at the level of the amputation which extends distally. There is susceptibility artifact in the region of the amputation consistent with postoperative change. There is hyperintense T2 marrow signal throughout the remaining fifth metatarsal and within the adjacent fourth metatarsal head which is nonspecific and could be related to recent postoperative changes although osteomyelitis is suspected.    There is edema and mild atrophy within the plantar muscles of the foot. There is minimal spurring at the first metatarsophalangeal and hallux sesamoid articulations.    Impression:  Resection at the mid aspect of the fifth metatarsal. Lateral soft tissue wound with marrow signal abnormality throughout the fifth metatarsal and within the adjacent fourth metatarsal head which is nonspecific in the setting of recent surgery although osteomyelitis is suspected.        ASSESSMENT :     Headache    HTN (hypertension)    HLD (hyperlipidemia)    DM (diabetes mellitus)    CAD (coronary artery disease)    Glaucoma, angle-closure    [place] (hard of hearing)      S/P CABG (coronary artery bypass graft)    History of thyroid surgery        PLAN:    HPI:  76M from home, lives with daughter w/ PMH DM on insulin, HTN, HLD, CAD, 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 Pt with sharp pain on the R lateral foot. As per daughter, pt was taking tylenol which did not help his pain prompting the patient to ask his daughter to take him to the hospital. Pt is a poor historian. Daughter states that the patient did not see his podiatrist after the surgery. No fever, chest, pain, palpitations, nausea, vomiting, diarrhea (17 Sep 2021 01:11)    # CASE D/W PATIENT CARE TEAM CHERELLE DIAL TO HELP ASSIST WITH PATIENT AND FAMILY'S NEEDS AT THIS TIME     # PATIENT IS S/P ICU MONITORING AND RIGHT CHEST TUBE REMOVAL ON 10/15/2021     # COVID EXPOSURE ON 10/13 AND ONCE MORE HAD RE-EXPOSURE ON 10/22 - INFECTION CONTROL IS AWARE AND ADDRESSING - ON CONTACT AND DROPLET ISOLATION PRECAUTION; F/U COVID PCR    # SUSPECT RIGHT FOOT OSTEOMYELITIS S/P RIGHT 5TH RAY RESECTION [8/19] AND S/P DEBRIDEMENT, GRAFT APPLICATION, BONE BX AND WOUND VAC APPLICATION 9/22 - BONE BX W/ ACUTE OSTEOMYELITIS AND NECROTIC PERIOSTEAL TISSUE  ** RECENT ADMISSION FOR RIGHT DIABETIC FOOT ULCER, CELLULITIS, OSTEOMYELITIS RIGHT 5th METATARSAL S/P RIGHT 5TH PARTIAL RAY AMPUTATION [8/20] - BONE PATHOLOGY W/ CLEAN MARGINS    - S/P VANCOMYCIN AND ZOSYN ; NOW ON UNASYN AND LEVAQUIN GIVEN OREN; PER ID SWITCH TO ZOSYN UNTIL 11/3   - RECENTLY HAD SIMON W/ MILD PAD  - REVIEWED WOUND CX [ ENTEROCOCCUS, AEROMONAS, KLEBSIELLA] AND BCX  - ID CONSULT, PODIATRY CONSULT    - PICC LINE PLACED TO COMPLETE 6 WEEKS OF ANTIBIOTICS - ON ZOSYN UNTIL 11/3 ON D/C    # ACUTE HYPOXIC RESPIRATORY FAILURE DUE TO ACUTE ON CHRONIC SYSTOLIC CHF  (  LV EF 40- 45 % ) , DIASTOLIC LV DYSFUNCTION , ,  SEVERE AORTIC STENOSIS & MODERATE MITRAL REGURGITATION  &  ASPIRATION PNA;  - S/P CHEST TUBE INSERTION AND REMOVAL  , S/P LASIX ,   CARDIOLOGY CONSULT IN PROGRESS      - CHEST TUBE PLACED [10/8] - FLUID CONSISTENT WITH TRANSUDATIVE PROCESS  - S/P EXTUBATION 10/13  - S/P CHEST TUBE REMOVAL 10/15      # SEPTIC SHOCK - ? S/T HYPOVOLEMIA VS. SEPSIS - S/P CVC [SWITCHED FROM FEMORAL TO LEFT IJ], S/P VASOPRESSORS - RESOLVING  - BCX - NGTD  - ON MEROPENEM      # TRANSIENT NEW ONSET A.FIB, PAROXYSMAL - ON ELIQUIS, CARDIOLOGY CONSULT IN PROGRESS    # FUNGURIA - D/C FLUCONAZOLE GIVEN TRANSAMINITIS    # TRANSAMINITIS - SUSPECT THIS IS ISCHEMIC HEPATITIS - IMPROVING - HEPATOLOGY CONSULT IN PROGRESS    # BOWEL DISTENTION - ? ILEUS - OPTIMIZING ELECTROLYTES - IMPROVED  - SURGERY CONSULT IN PROGRESS    # CHOLELITHIASIS - TRENDING LFTS, RUQ U/S - CHOLELITHIASIS, S/P SURGERY CONSULT   - S/P GI CONSULT - LESS SUSPICIOUS FOR CHOLECYSTITIS    # DYSPEPSIA - PLACED ON PPI BID WITH IMPROVEMENT, UNDERWENT ST EVAL     # ELEVATED TROPONINS - NSTEMI - ? DEMAND - WILL NEED ISCHEMIC EVAL ONCE ACUTE INFECTION RESOLVES PER CARDIOLOGY, CARDIOLOGY CONSULT IN PROGRESS  - ON ASA, STATIN, BB    # OREN ON CKD - S/T ATN AND URINARY RETENTION - S/P IVF, NOW W/ CASTAÑEDA, NEPHROLOGY CONUSLT IN PROGRESS  -  BPH ON FLOMAX, AND FINASTERIDE  - FAILED TOV WITH WORSENING RENAL FXN - NOTED URINARY RETENTION [10/4] - REPLACED CASTAÑEDA  - TOV 10/18 - BLADDER SCAN BID TO EVALUATE FOR URINARY RETENTION - FAILED TOV  - OVERNIGHT 10/18 - CASTAÑEDA REPLACED    # MEDICAL CLEARANCE FOR SURGERY - RCRI - 2 - 10.1 % 30 DAY RISK OF DEATH, MI OR CARDIAC ARREST  - CARDIOLOGY CONSULT     # DM -  HBA1C - 11  - LANTUS, SSI + FS    # CAD S/P CABG - PLACED ON ASA, STATIN, BB  - ECHO - SEVERE AORTIC STENOSIS, SEVERE CONCENTRIC LVH, G1DD, MILD CT  - CARDIOLOGY CONSULT - DR. BASSETT   - MAY NEED ISCHEMIC EVAL ONCE ACUTE ILLNESS RESOLVES INCLUDING CARDIAC CATHETERIZATION    # HTN, HLD  - ON METOPROLOL, NIFEDIPINE, STATIN       #  IMPAIRED GAIT DUE TO CERVICAL & LS SPONDYLOSIS, POLY ARTHRITIS AND DIABETIC PERIPHERAL NEUROPATHY, OP VITAMIN D DEFICIENCY - ON CHOLECALCIFEROL, OBTAIN PT & OT   #  FAILURE TO THRIVE , MODERATE PROTEIN CALORIE MALNUTRITION       # CASE DISCUSSED AT LENGTH WITH PATIENT AND DAUGHTER, BIRDIE ROBERT VIA PHONE @ [phone] [10/25] - CASE DICUSSED AT LENGTH AND ALL QUESTIONS WERE ANSWERED. DAUGHTER UNDERSTOOD THAT PROGNOSIS IS GUARDED.  # PATIENT AND DAUGHTER REFUSED [facility] ON PREVIOUS ADMISSION, PREVIOUSLY WISHED FOR PATIENT RETURN HOME W/ HOME PT; HOWEVER NOW, DAUGHTER WISHES FOR PATIENT TO GO TO [facility], AS PATIENT'S WIFE IS CURRENTLY ADMITTED THERE    # GI AND DVT PPX

## 2021-10-28 NOTE — PROGRESS NOTE ADULT - PROBLEM SELECTOR PLAN 8
OREN resolved  Failed TOV- will need to continue ibrahim and outpatient f/u with Urology  closely monitor SCr, as patient is  on Zosyn

## 2021-10-28 NOTE — PROGRESS NOTE ADULT - SUBJECTIVE AND OBJECTIVE BOX
Patient is seen and examined at the bed side, is afebrile.  The creatinine is stable, not worsening.      REVIEW OF SYSTEMS: All other review systems are negative      ALLERGIES: No Known Allergies      Vital Signs Last 24 Hrs  T(C): 36.3 (28 Oct 2021 12:30), Max: 36.7 (27 Oct 2021 19:33)  T(F): 97.4 (28 Oct 2021 12:30), Max: 98.1 (27 Oct 2021 19:33)  HR: 74 (28 Oct 2021 12:30) (66 - 79)  BP: 144/68 (28 Oct 2021 12:30) (125/55 - 151/72)  BP(mean): 73 (27 Oct 2021 19:33) (73 - 73)  RR: 17 (28 Oct 2021 12:30) (15 - 18)  SpO2: 100% (28 Oct 2021 12:30) (100% - 100%)      PHYSICAL EXAM:  GENERAL: Not in distress  CHEST/LUNG:  Not using accessory muscles, s/p removal of Right CT   HEART: s1 and s2 present  ABDOMEN:  Mild distended   EXTREMITIES: Right foot bandage in placed   CNS: Awake and alert       LABS: No new Labs                           9.1    4.88  )-----------( 273      ( 27 Oct 2021 06:23 )             28.0                9.7    4.30  )-----------( 300      ( 25 Oct 2021 08:37 )             29.8       10-27    140  |  109<H>  |  8   ----------------------------<  117<H>  4.2   |  24  |  1.25    Ca    8.9      27 Oct 2021 06:23    TPro  7.1  /  Alb  2.6<L>  /  TBili  0.7  /  DBili  x   /  AST  27  /  ALT  34  /  AlkPhos  125<H>  10-25    10-22    140  |  105  |  12  ----------------------------<  101<H>  4.3   |  27  |  1.07    Ca    9.7      22 Oct 2021 07:07  Phos  3.5     10-22  Mg     2.3     10-22    TPro  6.8  /  Alb  2.4<L>  /  TBili  0.7  /  DBili  x   /  AST  31  /  ALT  50  /  AlkPhos  130<H>  10-21      09-25    139  |  107  |  41<H>  ----------------------------<  134<H>  4.1   |  21<L>  |  4.05<H>    Ca    8.6      25 Sep 2021 06:40    TPro  6.6  /  Alb  2.3<L>  /  TBili  0.5  /  DBili  x   /  AST  25  /  ALT  15  /  AlkPhos  102  09-25        CAPILLARY BLOOD GLUCOSE  POCT Blood Glucose.: 134 mg/dL (17 Sep 2021 17:11)  POCT Blood Glucose.: 128 mg/dL (17 Sep 2021 11:06)  POCT Blood Glucose.: 157 mg/dL (17 Sep 2021 04:10)      Vancomycin Level, Trough (10.14.21 @ 11:32) : 21.8  Vancomycin Level, Trough (10.12.21 @ 12:13) : 19.5:      MEDICATIONS  (STANDING):    apixaban 5 milliGRAM(s) Oral every 12 hours  aspirin  chewable 81 milliGRAM(s) Oral daily  atorvastatin 80 milliGRAM(s) Oral at bedtime  chlorhexidine 2% Cloths 1 Application(s) Topical <User Schedule>  collagenase Ointment 1 Application(s) Topical daily  cyanocobalamin 1000 MICROGram(s) Oral daily  dextrose 5%. 1000 milliLiter(s) (100 mL/Hr) IV Continuous <Continuous>  ergocalciferol 34102 Unit(s) Oral <User Schedule>  ferrous    sulfate Liquid 300 milliGRAM(s) Enteral Tube daily  finasteride 5 milliGRAM(s) Oral daily  gabapentin 100 milliGRAM(s) Oral three times a day  glucagon  Injectable 1 milliGRAM(s) IntraMuscular once  insulin lispro (ADMELOG) corrective regimen sliding scale   SubCutaneous Before meals and at bedtime  metoprolol tartrate 25 milliGRAM(s) Oral two times a day  NIFEdipine XL 60 milliGRAM(s) Oral daily  pantoprazole  Injectable 40 milliGRAM(s) IV Push at bedtime  piperacillin/tazobactam IVPB.. 3.375 Gram(s) IV Intermittent every 8 hours        RADIOLOGY & ADDITIONAL TESTS:    10/5/21 CT Chest No Cont (10.05.21 @ 14:07) Pulmonary edema with bilateral moderate to large pleural effusions. Underlying bilateral lower lobe compressive atelectasis versus pneumonia.  Mildly enlarged mediastinal lymph nodes, possibly reactive.      10/2/21: Xray Chest 1 View- PORTABLE-Routine (Xray Chest 1 View- PORTABLE-Routine in AM.) (10.02.21 @ 09:46) There is persistent bilateral perihilar/basilar diffuse airspace disease and/or RIGHT effusion.  Cardiomegaly.  No interval change.    Collected Date/Time: 9/22/2021 08:42 EDT   Received Date/Time: 9/23/2021 09:29 EDT   Surgical Pathology Report - Auth (Verified)   Specimen(s) Submitted   1 Right 5th Toe Wound Debridement r/o Osteomyelitis   Final Diagnosis   Right fifth toe; wound debridement:   - Bone with acute osteomyelitis and necrotic periosteal tissue.     9/28/21: US Abdomen Upper Quadrant Right (09.28.21 @ 12:13) Cholelithiasis without evidence of acute cholecystitis. Note is made of right pleural effusion    9/27/21: CT Abdomen and Pelvis w/ Oral Cont (09.27.21 @ 15:53) >No acute intra-abdominal pathology.    Small bilateral pleural effusions with compressive atelectasis of both lower lobes.    9/26/21: Xray Chest 1 View-PORTABLE IMMEDIATE (Xray Chest 1 View-PORTABLE IMMEDIATE .) (09.26.21 @ 15:28) : Small bilateral pleural effusions and mild pulmonary edema. A right lower lobe pneumonia is not excluded.      9/26/21: Xray Abdomen 1 View Portable, IMMEDIATE (Xray Abdomen 1 View Portable, IMMEDIATE .) (09.26.21 @ 15:28) : There is a nasogastric tube with its tip in the stomach. There is gaseous distention of the colon. No pathologic calcifications are seen. The osseous structures are intact with degenerative change is present in the spine.      9/17/21 : MR Foot No Cont, Right (09.17.21 @ 13:04) : Resection at the mid aspect of the fifth metatarsal. Lateral soft tissue wound with marrow signal abnormality throughout the fifth metatarsal and within the adjacent fourth metatarsal head which is nonspecific in the setting of recent surgery although osteomyelitis is suspected.    9/16/21 : Xray Foot AP + Lateral + Oblique, Right (09.16.21 @ 15:03) : No acute finding. No plain film evidence of osteomyelitis.        MICROBIOLOGY DATA:    COVID-19 PCR (10.11.21 @ 05:44)   COVID-19 PCR: NotDetec:   Urine Microscopic-Add On (NC) (09.22.21 @ 16:14)   Red Blood Cell - Urine: 0-2 /HPF   White Blood Cell - Urine: 3-5 /HPF   Bacteria: Moderate /HPF   Comment - Urine: yeast cells present   Epithelial Cells: Moderate /HPF     Culture - Tissue with Gram Stain (09.22.21 @ 13:54)   Gram Stain: No polymorphonuclear cells seen per low power field   No organisms seen per oil power field   Specimen Source: .Tissue Right 5th toe r/o osteomyeltis   Culture Results: No growth     COVID-19 David Domain Antibody (09.18.21 @ 12:55)   COVID-19 David Domain Antibody Result: >250.00:    Culture - Blood (09.17.21 @ 10:28)   Specimen Source: .Blood Blood-Peripheral   Culture Results: No growth to date.     Culture - Blood (09.17.21 @ 10:28)   Specimen Source: .Blood Blood-Venous   Culture Results: No growth to date.     Culture - Surgical Swab (09.16.21 @ 21:42)   - Amikacin: S <=16   - Amikacin: S <=16   - Amoxicillin/Clavulanic Acid: S <=8/4   - Ampicillin: R >16 These ampicillin results predict results for amoxicillin   - Ampicillin: S <=2 Predicts results to ampicillin/sulbactam, amoxacillin-clavulanate and piperacillin-tazobactam.   - Ampicillin/Sulbactam: S 8/4 Enterobacter, Citrobacter, and Serratia may develop resistance during prolonged therapy (3-4 days)   - Ampicillin/Sulbactam: R >16/8   - Aztreonam: S <=4   - Aztreonam: S <=4   - Cefazolin: R 16 Enterobacter, Citrobacter, and Serratia may develop resistance during prolonged therapy (3-4 days)   - Cefazolin: R >16   - Cefepime: S <=2   - Cefepime: S <=2   - Cefoxitin: S <=8   - Cefoxitin: S <=8   - Ceftazidime: S <=1   - Ceftriaxone: S <=1   - Ceftriaxone: S <=1 Enterobacter, Citrobacter, and Serratia may develop resistance during prolonged therapy   - Ciprofloxacin: S <=0.25   - Ciprofloxacin: S <=0.25   - Ertapenem: S <=0.5   - Gentamicin: S <=2   - Gentamicin: S <=2   - Imipenem: S <=1   - Levofloxacin: S <=0.5   - Levofloxacin: S <=0.5   - Meropenem: S <=1   - Meropenem: S <=1   - Piperacillin/Tazobactam: S <=8   - Piperacillin/Tazobactam: S <=8   - Tetra/Doxy: S 4   - Tobramycin: S <=2   - Trimethoprim/Sulfamethoxazole: S <=0.5/9.5   - Trimethoprim/Sulfamethoxazole: S <=0.5/9.5   - Vancomycin: S 2   Specimen Source: .Surgical Swab right foot wound   Culture Results:   Culture yields >4 types of aerobic and/or anaerobic bacteria   Call client services within 7 days if further workup is clinically   indicated. Culture includes   Rare Aeromonas hydrophila/caviae   Few Klebsiella oxytoca/Raoutella ornithinolytica   Few Enterococcus faecalis   Few Streptococcus agalactiae (Group B) isolated   Group B streptococci are susceptible to ampicillin,   penicillin and cefazolin, but may be resistant to   erythromycin and clindamycin.   Recommendations for intrapartum prophylaxis for Group B   streptococci are penicillin or ampicillin.   Organism Identification: Aeromonas hydrophila/caviae   Klebsiella oxytoca /Raoutella ornithinolytica   Enterococcus faecalis   Organism: Aeromonas hydrophila/caviae   Organism: Klebsiella oxytoca /Raoutella ornithinolytica   Organism: Enterococcus faecalis   COVID-19 PCR . (09.16.21 @ 22:24)   COVID-19 PCR: NotDetec:

## 2021-10-28 NOTE — PROGRESS NOTE ADULT - ASSESSMENT
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  As per daughter, patient was taking tylenol which did not help his pain prompting the patient to ask his daughter to take him to the hospital. ON admission, he has no fever or Leukocytosis. The Xray of Right foot shows no Osteomyelitis but MRI of Right foot shows Lateral soft tissue wound with marrow signal abnormality throughout the fifth metatarsal which is consistent of Osteomyelitis. He has seen by Podiatry and wound culture has sent, Zosyn and Vancomycin has started. The ID consult requested to assist with further evaluation and antibiotic management.     # Right Fifth metatarsal DFU with drainage and Osteomyelitis- wound cx grew Enterococcus, Aeromonas and Klebsiella - Zosyn is the ideal to cover All organisms but kidney function is worsening, hence change to Unasyn and Levaquin until kidney function is improved - The pathology shows Bone with acute osteomyelitis and necrotic periosteal tissue.   # OREN- s/p urinary retention -s/p ibrahim catheter - s/p discontinue ACEI  # RLL pneumonia- most likely Aspiration, S/p Vomiting - On Unasyn  # Large bowel distension  # Candiduria- 9/22/21  # Pulmonary edema with bilateral moderate to large pleural effusions- on CT chest, 10/5/21- s/p intubation 10/7- s/p Right sided chest tube placement by CT team, 10/8/21  # COVID Exposure - PCR negative as of  10/11/21, 10/21/21 and re-exposed and in Quarintine until 11/1/21    would recommend:    1. OOB to chair /PT  2. Monitor kidney function closely while on Zosyn   3. Aspiration precaution    4.. Wound care as per Podiatry  5. Continue Zosyn until 11/3/21    Attending Attestation:    Spent more than 35 minutes on total encounter, more than 50 % of the visit was spent counseling and/or coordinating care by the Attending physician.       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  As per daughter, patient was taking tylenol which did not help his pain prompting the patient to ask his daughter to take him to the hospital. ON admission, he has no fever or Leukocytosis. The Xray of Right foot shows no Osteomyelitis but MRI of Right foot shows Lateral soft tissue wound with marrow signal abnormality throughout the fifth metatarsal which is consistent of Osteomyelitis. He has seen by Podiatry and wound culture has sent, Zosyn and Vancomycin has started. The ID consult requested to assist with further evaluation and antibiotic management.     # Right Fifth metatarsal DFU with drainage and Osteomyelitis- wound cx grew Enterococcus, Aeromonas and Klebsiella - Zosyn is the ideal to cover All organisms but kidney function is worsening, hence change to Unasyn and Levaquin until kidney function is improved - The pathology shows Bone with acute osteomyelitis and necrotic periosteal tissue.   # OREN- s/p urinary retention -s/p ibrahim catheter - s/p discontinue ACEI  # RLL pneumonia- most likely Aspiration, S/p Vomiting - On Unasyn  # Large bowel distension  # Candiduria- 9/22/21  # Pulmonary edema with bilateral moderate to large pleural effusions- on CT chest, 10/5/21- s/p intubation 10/7- s/p Right sided chest tube placement by CT team, 10/8/21  # COVID Exposure - PCR negative as of  10/11/21, 10/21/21 and re-exposed and in Quarintine until 11/1/21    would recommend:    1. PT/ OOB to chair   2. Monitor kidney function closely while on Zosyn   3. Aspiration precaution    4.. Wound care as per Podiatry  5. Continue Zosyn until 11/3/21    d/w Covering NP    Attending Attestation:    Spent more than 35 minutes on total encounter, more than 50 % of the visit was spent counseling and/or coordinating care by the Attending physician.

## 2021-10-29 LAB
ANION GAP SERPL CALC-SCNC: 9 MMOL/L — SIGNIFICANT CHANGE UP (ref 5–17)
BUN SERPL-MCNC: 10 MG/DL — SIGNIFICANT CHANGE UP (ref 7–18)
CALCIUM SERPL-MCNC: 9.2 MG/DL — SIGNIFICANT CHANGE UP (ref 8.4–10.5)
CHLORIDE SERPL-SCNC: 106 MMOL/L — SIGNIFICANT CHANGE UP (ref 96–108)
CO2 SERPL-SCNC: 25 MMOL/L — SIGNIFICANT CHANGE UP (ref 22–31)
CREAT SERPL-MCNC: 1.19 MG/DL — SIGNIFICANT CHANGE UP (ref 0.5–1.3)
GLUCOSE BLDC GLUCOMTR-MCNC: 115 MG/DL — HIGH (ref 70–99)
GLUCOSE BLDC GLUCOMTR-MCNC: 116 MG/DL — HIGH (ref 70–99)
GLUCOSE BLDC GLUCOMTR-MCNC: 116 MG/DL — HIGH (ref 70–99)
GLUCOSE BLDC GLUCOMTR-MCNC: 134 MG/DL — HIGH (ref 70–99)
GLUCOSE SERPL-MCNC: 117 MG/DL — HIGH (ref 70–99)
HCT VFR BLD CALC: 30.4 % — LOW (ref 39–50)
HGB BLD-MCNC: 9.9 G/DL — LOW (ref 13–17)
MCHC RBC-ENTMCNC: 29.6 PG — SIGNIFICANT CHANGE UP (ref 27–34)
MCHC RBC-ENTMCNC: 32.6 GM/DL — SIGNIFICANT CHANGE UP (ref 32–36)
MCV RBC AUTO: 91 FL — SIGNIFICANT CHANGE UP (ref 80–100)
NRBC # BLD: 0 /100 WBCS — SIGNIFICANT CHANGE UP (ref 0–0)
PLATELET # BLD AUTO: 302 K/UL — SIGNIFICANT CHANGE UP (ref 150–400)
POTASSIUM SERPL-MCNC: 3.8 MMOL/L — SIGNIFICANT CHANGE UP (ref 3.5–5.3)
POTASSIUM SERPL-SCNC: 3.8 MMOL/L — SIGNIFICANT CHANGE UP (ref 3.5–5.3)
RBC # BLD: 3.34 M/UL — LOW (ref 4.2–5.8)
RBC # FLD: 14.7 % — HIGH (ref 10.3–14.5)
SARS-COV-2 RNA SPEC QL NAA+PROBE: SIGNIFICANT CHANGE UP
SODIUM SERPL-SCNC: 140 MMOL/L — SIGNIFICANT CHANGE UP (ref 135–145)
WBC # BLD: 5.03 K/UL — SIGNIFICANT CHANGE UP (ref 3.8–10.5)
WBC # FLD AUTO: 5.03 K/UL — SIGNIFICANT CHANGE UP (ref 3.8–10.5)

## 2021-10-29 RX ORDER — SODIUM CHLORIDE 9 MG/ML
1000 INJECTION, SOLUTION INTRAVENOUS
Refills: 0 | Status: DISCONTINUED | OUTPATIENT
Start: 2021-10-30 | End: 2021-11-03

## 2021-10-29 RX ORDER — PANTOPRAZOLE SODIUM 20 MG/1
40 TABLET, DELAYED RELEASE ORAL
Refills: 0 | Status: DISCONTINUED | OUTPATIENT
Start: 2021-10-30 | End: 2021-11-03

## 2021-10-29 RX ADMIN — APIXABAN 5 MILLIGRAM(S): 2.5 TABLET, FILM COATED ORAL at 17:14

## 2021-10-29 RX ADMIN — CHLORHEXIDINE GLUCONATE 1 APPLICATION(S): 213 SOLUTION TOPICAL at 05:39

## 2021-10-29 RX ADMIN — Medication 300 MILLIGRAM(S): at 11:58

## 2021-10-29 RX ADMIN — GABAPENTIN 100 MILLIGRAM(S): 400 CAPSULE ORAL at 13:58

## 2021-10-29 RX ADMIN — GABAPENTIN 100 MILLIGRAM(S): 400 CAPSULE ORAL at 05:40

## 2021-10-29 RX ADMIN — PIPERACILLIN AND TAZOBACTAM 25 GRAM(S): 4; .5 INJECTION, POWDER, LYOPHILIZED, FOR SOLUTION INTRAVENOUS at 21:56

## 2021-10-29 RX ADMIN — APIXABAN 5 MILLIGRAM(S): 2.5 TABLET, FILM COATED ORAL at 05:36

## 2021-10-29 RX ADMIN — Medication 25 MILLIGRAM(S): at 17:14

## 2021-10-29 RX ADMIN — PREGABALIN 1000 MICROGRAM(S): 225 CAPSULE ORAL at 11:58

## 2021-10-29 RX ADMIN — PIPERACILLIN AND TAZOBACTAM 25 GRAM(S): 4; .5 INJECTION, POWDER, LYOPHILIZED, FOR SOLUTION INTRAVENOUS at 13:55

## 2021-10-29 RX ADMIN — Medication 81 MILLIGRAM(S): at 11:58

## 2021-10-29 RX ADMIN — FINASTERIDE 5 MILLIGRAM(S): 5 TABLET, FILM COATED ORAL at 11:58

## 2021-10-29 RX ADMIN — Medication 60 MILLIGRAM(S): at 05:36

## 2021-10-29 RX ADMIN — Medication 1 APPLICATION(S): at 11:59

## 2021-10-29 RX ADMIN — Medication 25 MILLIGRAM(S): at 05:36

## 2021-10-29 RX ADMIN — PIPERACILLIN AND TAZOBACTAM 25 GRAM(S): 4; .5 INJECTION, POWDER, LYOPHILIZED, FOR SOLUTION INTRAVENOUS at 05:36

## 2021-10-29 NOTE — PROGRESS NOTE ADULT - PROBLEM SELECTOR PLAN 8
OREN resolved  Failed TOV- will need to continue ibrahim and outpatient f/u with Urology  closely monitor SCr, as patient is  on Zosyn OREN resolved  Failed TOV- will need to continue ibrahim and outpatient f/u with Urology  closely monitor SCr, as patient is  on Zosyn  Give IVF 10/30 x 24 hs then monitor BMP next day.   no need for daily phlebotomy.

## 2021-10-29 NOTE — PROGRESS NOTE ADULT - PROBLEM SELECTOR PLAN 11
COVID exposure 10/23, quarantine until 11/1  Pt will be medically stable for discharge s/p quarantine  Follow up PCR 10/30  PT recommending [facility]  pending auth  Care management following for discharge planning

## 2021-10-29 NOTE — PROGRESS NOTE ADULT - SUBJECTIVE AND OBJECTIVE BOX
Patient is seen and examined at the bed side this Morning, is afebrile. He has no new complaints. The creatinine is trending back down.       REVIEW OF SYSTEMS: All other review systems are negative      ALLERGIES: No Known Allergies      Vital Signs Last 24 Hrs  T(C): 36.4 (29 Oct 2021 05:42), Max: 36.4 (28 Oct 2021 21:29)  T(F): 97.5 (29 Oct 2021 05:42), Max: 97.6 (28 Oct 2021 21:29)  HR: 82 (29 Oct 2021 05:42) (77 - 82)  BP: 115/66 (29 Oct 2021 05:42) (115/66 - 136/69)  BP(mean): --  RR: 18 (29 Oct 2021 05:42) (18 - 18)  SpO2: 100% (29 Oct 2021 05:42) (100% - 100%)      PHYSICAL EXAM:  GENERAL: Not in distress  CHEST/LUNG:  Not using accessory muscles, s/p removal of Right CT   HEART: s1 and s2 present  ABDOMEN:  Mild distended   EXTREMITIES: Right foot bandage in placed   CNS: Awake and alert       LABS:                           9.9    5.03  )-----------( 302      ( 29 Oct 2021 06:09 )             30.4                           9.1    4.88  )-----------( 273      ( 27 Oct 2021 06:23 )             28.0       10-29    140  |  106  |  10  ----------------------------<  117<H>  3.8   |  25  |  1.19    Ca    9.2      29 Oct 2021 06:09      10-27    140  |  109<H>  |  8   ----------------------------<  117<H>  4.2   |  24  |  1.25    Ca    8.9      27 Oct 2021 06:23    TPro  7.1  /  Alb  2.6<L>  /  TBili  0.7  /  DBili  x   /  AST  27  /  ALT  34  /  AlkPhos  125<H>  10-25        09-25    139  |  107  |  41<H>  ----------------------------<  134<H>  4.1   |  21<L>  |  4.05<H>    Ca    8.6      25 Sep 2021 06:40    TPro  6.6  /  Alb  2.3<L>  /  TBili  0.5  /  DBili  x   /  AST  25  /  ALT  15  /  AlkPhos  102  09-25        CAPILLARY BLOOD GLUCOSE  POCT Blood Glucose.: 134 mg/dL (17 Sep 2021 17:11)  POCT Blood Glucose.: 128 mg/dL (17 Sep 2021 11:06)  POCT Blood Glucose.: 157 mg/dL (17 Sep 2021 04:10)      Vancomycin Level, Trough (10.14.21 @ 11:32) : 21.8  Vancomycin Level, Trough (10.12.21 @ 12:13) : 19.5:      MEDICATIONS  (STANDING):    apixaban 5 milliGRAM(s) Oral every 12 hours  aspirin  chewable 81 milliGRAM(s) Oral daily  atorvastatin 80 milliGRAM(s) Oral at bedtime  chlorhexidine 2% Cloths 1 Application(s) Topical <User Schedule>  collagenase Ointment 1 Application(s) Topical daily  cyanocobalamin 1000 MICROGram(s) Oral daily  dextrose 5%. 1000 milliLiter(s) (100 mL/Hr) IV Continuous <Continuous>  ergocalciferol 12570 Unit(s) Oral <User Schedule>  ferrous    sulfate Liquid 300 milliGRAM(s) Enteral Tube daily  finasteride 5 milliGRAM(s) Oral daily  gabapentin 100 milliGRAM(s) Oral three times a day  glucagon  Injectable 1 milliGRAM(s) IntraMuscular once  insulin lispro (ADMELOG) corrective regimen sliding scale   SubCutaneous Before meals and at bedtime  metoprolol tartrate 25 milliGRAM(s) Oral two times a day  NIFEdipine XL 60 milliGRAM(s) Oral daily  piperacillin/tazobactam IVPB.. 3.375 Gram(s) IV Intermittent every 8 hours        RADIOLOGY & ADDITIONAL TESTS:    10/5/21 CT Chest No Cont (10.05.21 @ 14:07) Pulmonary edema with bilateral moderate to large pleural effusions. Underlying bilateral lower lobe compressive atelectasis versus pneumonia.  Mildly enlarged mediastinal lymph nodes, possibly reactive.      10/2/21: Xray Chest 1 View- PORTABLE-Routine (Xray Chest 1 View- PORTABLE-Routine in AM.) (10.02.21 @ 09:46) There is persistent bilateral perihilar/basilar diffuse airspace disease and/or RIGHT effusion.  Cardiomegaly.  No interval change.    Collected Date/Time: 9/22/2021 08:42 EDT   Received Date/Time: 9/23/2021 09:29 EDT   Surgical Pathology Report - Auth (Verified)   Specimen(s) Submitted   1 Right 5th Toe Wound Debridement r/o Osteomyelitis   Final Diagnosis   Right fifth toe; wound debridement:   - Bone with acute osteomyelitis and necrotic periosteal tissue.     9/28/21: US Abdomen Upper Quadrant Right (09.28.21 @ 12:13) Cholelithiasis without evidence of acute cholecystitis. Note is made of right pleural effusion    9/27/21: CT Abdomen and Pelvis w/ Oral Cont (09.27.21 @ 15:53) >No acute intra-abdominal pathology.    Small bilateral pleural effusions with compressive atelectasis of both lower lobes.    9/26/21: Xray Chest 1 View-PORTABLE IMMEDIATE (Xray Chest 1 View-PORTABLE IMMEDIATE .) (09.26.21 @ 15:28) : Small bilateral pleural effusions and mild pulmonary edema. A right lower lobe pneumonia is not excluded.      9/26/21: Xray Abdomen 1 View Portable, IMMEDIATE (Xray Abdomen 1 View Portable, IMMEDIATE .) (09.26.21 @ 15:28) : There is a nasogastric tube with its tip in the stomach. There is gaseous distention of the colon. No pathologic calcifications are seen. The osseous structures are intact with degenerative change is present in the spine.      9/17/21 : MR Foot No Cont, Right (09.17.21 @ 13:04) : Resection at the mid aspect of the fifth metatarsal. Lateral soft tissue wound with marrow signal abnormality throughout the fifth metatarsal and within the adjacent fourth metatarsal head which is nonspecific in the setting of recent surgery although osteomyelitis is suspected.    9/16/21 : Xray Foot AP + Lateral + Oblique, Right (09.16.21 @ 15:03) : No acute finding. No plain film evidence of osteomyelitis.        MICROBIOLOGY DATA:    COVID-19 PCR (10.11.21 @ 05:44)   COVID-19 PCR: NotDetec:   Urine Microscopic-Add On (NC) (09.22.21 @ 16:14)   Red Blood Cell - Urine: 0-2 /HPF   White Blood Cell - Urine: 3-5 /HPF   Bacteria: Moderate /HPF   Comment - Urine: yeast cells present   Epithelial Cells: Moderate /HPF     Culture - Tissue with Gram Stain (09.22.21 @ 13:54)   Gram Stain: No polymorphonuclear cells seen per low power field   No organisms seen per oil power field   Specimen Source: .Tissue Right 5th toe r/o osteomyeltis   Culture Results: No growth     COVID-19 David Domain Antibody (09.18.21 @ 12:55)   COVID-19 David Domain Antibody Result: >250.00:    Culture - Blood (09.17.21 @ 10:28)   Specimen Source: .Blood Blood-Peripheral   Culture Results: No growth to date.     Culture - Blood (09.17.21 @ 10:28)   Specimen Source: .Blood Blood-Venous   Culture Results: No growth to date.     Culture - Surgical Swab (09.16.21 @ 21:42)   - Amikacin: S <=16   - Amikacin: S <=16   - Amoxicillin/Clavulanic Acid: S <=8/4   - Ampicillin: R >16 These ampicillin results predict results for amoxicillin   - Ampicillin: S <=2 Predicts results to ampicillin/sulbactam, amoxacillin-clavulanate and piperacillin-tazobactam.   - Ampicillin/Sulbactam: S 8/4 Enterobacter, Citrobacter, and Serratia may develop resistance during prolonged therapy (3-4 days)   - Ampicillin/Sulbactam: R >16/8   - Aztreonam: S <=4   - Aztreonam: S <=4   - Cefazolin: R 16 Enterobacter, Citrobacter, and Serratia may develop resistance during prolonged therapy (3-4 days)   - Cefazolin: R >16   - Cefepime: S <=2   - Cefepime: S <=2   - Cefoxitin: S <=8   - Cefoxitin: S <=8   - Ceftazidime: S <=1   - Ceftriaxone: S <=1   - Ceftriaxone: S <=1 Enterobacter, Citrobacter, and Serratia may develop resistance during prolonged therapy   - Ciprofloxacin: S <=0.25   - Ciprofloxacin: S <=0.25   - Ertapenem: S <=0.5   - Gentamicin: S <=2   - Gentamicin: S <=2   - Imipenem: S <=1   - Levofloxacin: S <=0.5   - Levofloxacin: S <=0.5   - Meropenem: S <=1   - Meropenem: S <=1   - Piperacillin/Tazobactam: S <=8   - Piperacillin/Tazobactam: S <=8   - Tetra/Doxy: S 4   - Tobramycin: S <=2   - Trimethoprim/Sulfamethoxazole: S <=0.5/9.5   - Trimethoprim/Sulfamethoxazole: S <=0.5/9.5   - Vancomycin: S 2   Specimen Source: .Surgical Swab right foot wound   Culture Results:   Culture yields >4 types of aerobic and/or anaerobic bacteria   Call client services within 7 days if further workup is clinically   indicated. Culture includes   Rare Aeromonas hydrophila/caviae   Few Klebsiella oxytoca/Raoutella ornithinolytica   Few Enterococcus faecalis   Few Streptococcus agalactiae (Group B) isolated   Group B streptococci are susceptible to ampicillin,   penicillin and cefazolin, but may be resistant to   erythromycin and clindamycin.   Recommendations for intrapartum prophylaxis for Group B   streptococci are penicillin or ampicillin.   Organism Identification: Aeromonas hydrophila/caviae   Klebsiella oxytoca /Raoutella ornithinolytica   Enterococcus faecalis   Organism: Aeromonas hydrophila/caviae   Organism: Klebsiella oxytoca /Raoutella ornithinolytica   Organism: Enterococcus faecalis   COVID-19 PCR . (09.16.21 @ 22:24)   COVID-19 PCR: NotDetec:

## 2021-10-29 NOTE — PROGRESS NOTE ADULT - PROBLEM SELECTOR PLAN 2
Resolved  Monitored in ICU, Likely secondary to pleural effusion caused by CHF   S/P extubation on 10/13  Pigtail removed 10/15  Saturating well on room air  Patient stable w/o respiratory distress

## 2021-10-29 NOTE — PROGRESS NOTE ADULT - SUBJECTIVE AND OBJECTIVE BOX
Patient is a 76y old  Male who presents with a chief complaint of R Foot Pain (28 Oct 2021 16:57)    PATIENT IS SEEN AND EXAMINED IN MEDICAL FLOOR.  KEENANT [    ]    MADDY [   ]      GT [   ]    ALLERGIES:  No Known Allergies      Daily     Daily     VITALS:    Vital Signs Last 24 Hrs  T(C): 36.4 (29 Oct 2021 05:42), Max: 36.4 (28 Oct 2021 21:29)  T(F): 97.5 (29 Oct 2021 05:42), Max: 97.6 (28 Oct 2021 21:29)  HR: 82 (29 Oct 2021 05:42) (68 - 82)  BP: 115/66 (29 Oct 2021 05:42) (115/66 - 144/68)  BP(mean): --  RR: 18 (29 Oct 2021 05:42) (17 - 18)  SpO2: 100% (29 Oct 2021 05:42) (100% - 100%)    LABS:    CBC Full  -  ( 29 Oct 2021 06:09 )  WBC Count : 5.03 K/uL  RBC Count : 3.34 M/uL  Hemoglobin : 9.9 g/dL  Hematocrit : 30.4 %  Platelet Count - Automated : 302 K/uL  Mean Cell Volume : 91.0 fl  Mean Cell Hemoglobin : 29.6 pg  Mean Cell Hemoglobin Concentration : 32.6 gm/dL  Auto Neutrophil # : x  Auto Lymphocyte # : x  Auto Monocyte # : x  Auto Eosinophil # : x  Auto Basophil # : x  Auto Neutrophil % : x  Auto Lymphocyte % : x  Auto Monocyte % : x  Auto Eosinophil % : x  Auto Basophil % : x      10-29    140  |  106  |  10  ----------------------------<  117<H>  3.8   |  25  |  1.19    Ca    9.2      29 Oct 2021 06:09      CAPILLARY BLOOD GLUCOSE      POCT Blood Glucose.: 116 mg/dL (29 Oct 2021 07:56)  POCT Blood Glucose.: 115 mg/dL (28 Oct 2021 21:57)  POCT Blood Glucose.: 113 mg/dL (28 Oct 2021 16:55)  POCT Blood Glucose.: 116 mg/dL (28 Oct 2021 11:42)          Creatinine Trend: 1.19<--, 1.25<--, 1.17<--, 1.22<--, 1.07<--, 0.84<--  I&O's Summary    28 Oct 2021 07:01  -  29 Oct 2021 07:00  --------------------------------------------------------  IN: 0 mL / OUT: 1031 mL / NET: -1031 mL            .Body Fluid Pleural Fluid  10-08 @ 18:51   No growth at 1 week.  --  --      .Body Fluid Pleural Fluid  10-08 @ 18:34   No growth at 5 days  --    polymorphonuclear leukocytes seen  No organisms seen  by cytocentrifuge      .Tissue Right 5th toe r/o osteomyeltis  09-22 @ 13:54   No growth at 5 days  --    No polymorphonuclear cells seen per low power field  No organisms seen per oil power field      .Blood Blood-Venous  09-17 @ 10:28   No Growth Final  --  --      .Surgical Swab right foot wound  09-16 @ 21:42   Culture yields >4 types of aerobic and/or anaerobic bacteria  Call client services within 7 days if further workup is clinically  indicated. Culture includes  Rare Aeromonas hydrophila/caviae  Few Klebsiella oxytoca/Raoutella ornithinolytica  Few Enterococcus faecalis  Few Streptococcus agalactiae (Group B) isolated  Group B streptococci are susceptible to ampicillin,  penicillin and cefazolin, but may be resistant to  erythromycin and clindamycin.  Recommendations for intrapartum prophylaxis for Group B  streptococci are penicillin or ampicillin.  --  Aeromonas hydrophila/caviae  Klebsiella oxytoca /Raoutella ornithinolytica  Enterococcus faecalis      Bone R 5th metatarsal bone clean ma  08-20 @ 03:59   No growth at 5 days  --    No polymorphonuclear leukocytes seen per low power field  No organisms seen per oil power field      .Blood Blood-Venous  08-14 @ 21:09   No Growth Final  --  --      .Surgical Swab Right foot plantar wound  08-14 @ 21:08   Moderate Streptococcus agalactiae (Group B) isolated  Group B streptococci are susceptible to ampicillin,  penicillin and cefazolin, but may be resistant to  erythromycin and clindamycin.  Recommendations for intrapartum prophylaxis for Group B  streptococci are penicillin or ampicillin.  Moderate Bacteroides fragilis "Susceptibilities not performed"  Normal skin filiberto isolated  --  --          MEDICATIONS:    MEDICATIONS  (STANDING):  apixaban 5 milliGRAM(s) Oral every 12 hours  aspirin  chewable 81 milliGRAM(s) Oral daily  atorvastatin 80 milliGRAM(s) Oral at bedtime  chlorhexidine 2% Cloths 1 Application(s) Topical <User Schedule>  collagenase Ointment 1 Application(s) Topical daily  cyanocobalamin 1000 MICROGram(s) Oral daily  dextrose 5%. 1000 milliLiter(s) (100 mL/Hr) IV Continuous <Continuous>  ergocalciferol 65562 Unit(s) Oral <User Schedule>  ferrous    sulfate Liquid 300 milliGRAM(s) Enteral Tube daily  finasteride 5 milliGRAM(s) Oral daily  gabapentin 100 milliGRAM(s) Oral three times a day  glucagon  Injectable 1 milliGRAM(s) IntraMuscular once  insulin lispro (ADMELOG) corrective regimen sliding scale   SubCutaneous Before meals and at bedtime  metoprolol tartrate 25 milliGRAM(s) Oral two times a day  NIFEdipine XL 60 milliGRAM(s) Oral daily  pantoprazole  Injectable 40 milliGRAM(s) IV Push at bedtime  piperacillin/tazobactam IVPB.. 3.375 Gram(s) IV Intermittent every 8 hours      MEDICATIONS  (PRN):  acetaminophen   Tablet .. 650 milliGRAM(s) Oral every 6 hours PRN Temp greater or equal to 38C (100.4F), Moderate Pain (4 - 6)  ondansetron Injectable 4 milliGRAM(s) IV Push three times a day PRN Nausea and/or Vomiting  sodium chloride 0.9% lock flush 10 milliLiter(s) IV Push every 1 hour PRN Pre/post blood products, medications, blood draw, and to maintain line patency      REVIEW OF SYSTEMS:                           ALL ROS DONE [ X   ]    CONSTITUTIONAL:  LETHARGIC [   ], FEVER [   ], UNRESPONSIVE [   ]  CVS:  CP  [   ], SOB, [   ], PALPITATIONS [   ], DIZZYNESS [   ]  RS: COUGH [   ], SPUTUM [   ]  GI: ABDOMINAL PAIN [   ], NAUSEA [   ], VOMITINGS [   ], DIARRHEA [   ], CONSTIPATION [   ]  :  DYSURIA [   ], NOCTURIA [   ], INCREASED FREQUENCY [   ], DRIBLING [   ],  SKELETAL: PAINFUL JOINTS [   ], SWOLLEN JOINTS [   ], NECK ACHE [   ], LOW BACK ACHE [   ],  SKIN : ULCERS [   ], RASH [   ], ITCHING [   ]  CNS: HEAD ACHE [   ], DOUBLE VISION [   ], BLURRED VISION [   ], AMS / CONFUSION [   ], SEIZURES [   ], WEAKNESS [   ],TINGLING / NUMBNESS [   ]      PHYSICAL EXAMINATION:    GENERAL APPEARANCE: NO DISTRESS  HEENT:  NO PALLOR, NO  JVD,  NO   NODES, NECK SUPPLE  CVS: S1 +, S2 +,   RS: AEEB,  OCCASIONAL  RALES +,  RONCHI +  ABD: SOFT, NT, NO, BS +       EXT: NO PE  SKIN: WARM,   RIGHT FOOT WRAPPED  SKELETAL:  ROM ACCEPTABLE  CNS:  AAO X  2-3        RADIOLOGY :    < from: Transthoracic Echocardiogram (10.07.21 @ 15:26) >  CONCLUSIONS:  1. Normal mitral valve. Moderate mitral regurgitation.  2. Calcified aortic valve with decreased opening, cannot  exclude bicuspid. Peak transaortic valve gradient equals  32.4 mm Hg, mean transaortic valve gradient equals 19 mm  Hg, dimensionless index 0.22, estimated aortic valve area  equals 0.6sqcm (by continuity equation), consistent with  paradoxical low-flow low-gradient severe aortic stenosis.  Consider dobutamine stress echocardiogram and/or SABIHA for  further evaluation.  No aortic valve regurgitation seen.  3. Normal aortic root.  4. Normal left atrium.  5. Moderate concentric left ventricular hypertrophy.  6. Moderate global left ventricular systolic dysfunction  (EF 40-45% by visual estimation).  7. Grade II diastolic dysfunction (moderate).  8. Normal right atrium.  9. Normal right ventricular size with decreased RV systolic  function (TAPSE 1.2 cm).  10. RV systolic pressure is borderline normal at  34 mm Hg.  11. Tricuspid valve not well seen. Trace tricuspid  regurgitation.  12. Normal pulmonic valve. Trace pulmonic insufficiency is  noted.  13. No pericardial effusion.  14. Bilateral pleural effusions.    < end of copied text >      EXAM:  CT ABDOMEN AND PELVIS OC                            PROCEDURE DATE:  09/27/2021          INTERPRETATION:  CLINICAL INFORMATION: Small bowel obstruction.    COMPARISON: None.    CONTRAST/COMPLICATIONS:  IV Contrast: NONE  Oral Contrast: Omnipaque 300  Complications: None reported at time of study completion    PROCEDURE:  CT of the Abdomen and Pelvis was performed.  Sagittal and coronal reformats were performed.    FINDINGS:  LOWER CHEST: Small bilateral pleural effusions with compressiveatelectasis of both lower lobes.    LIVER: Within normal limits.  BILE DUCTS: Normal caliber.  GALLBLADDER: Distended. Small layering gallstones.  SPLEEN: Within normal limits.  PANCREAS: Within normal limits.  ADRENALS: Within normal limits.  KIDNEYS/URETERS: No hydronephrosis. Nonobstructing left upper pole calculus, measuring 0.6 cm.    BLADDER: Urinary bladder contains air and a Castañeda catheter balloon.  REPRODUCTIVE ORGANS: Prostate is not enlarged.    BOWEL: No bowel obstruction. Appendix isnormal. Colonic diverticulosis.  PERITONEUM: Mild presacral fluid.  VESSELS: Atherosclerotic changes.  RETROPERITONEUM/LYMPH NODES: No lymphadenopathy.  ABDOMINAL WALL: Within normal limits.  BONES: Degenerative changes. Sternotomy.    IMPRESSION:  No acute intra-abdominal pathology.    Small bilateral pleural effusions with compressive atelectasis of both lower lobes.    ====================================================    EXAM:  MR FOOT RT                            PROCEDURE DATE:  09/17/2021          INTERPRETATION:  Clinical Information: Recent fifth metatarsal head resection now with fifth digit pain, swelling and foul discharge.    Comparison: Radiographs of the right foot from 9/16/2021 and MRI the right foot from 8/16/2021.    Technique:  MRI of the right midfoot and forefoot.  Intravenous Contrast: None.    Findings:    There is a resection at the mid aspect of the fifth metatarsal shaft. There is a lateral soft tissue wound beginning at the level of the amputation which extends distally. There is susceptibility artifact in the region of the amputation consistent with postoperative change. There is hyperintense T2 marrow signal throughout the remaining fifth metatarsal and within the adjacent fourth metatarsal head which is nonspecific and could be related to recent postoperative changes although osteomyelitis is suspected.    There is edema and mild atrophy within the plantar muscles of the foot. There is minimal spurring at the first metatarsophalangeal and hallux sesamoid articulations.    Impression:  Resection at the mid aspect of the fifth metatarsal. Lateral soft tissue wound with marrow signal abnormality throughout the fifth metatarsal and within the adjacent fourth metatarsal head which is nonspecific in the setting of recent surgery although osteomyelitis is suspected.        ASSESSMENT :     Headache    HTN (hypertension)    HLD (hyperlipidemia)    DM (diabetes mellitus)    CAD (coronary artery disease)    Glaucoma, angle-closure    [place] (hard of hearing)      S/P CABG (coronary artery bypass graft)    History of thyroid surgery        PLAN:    HPI:  76M from home, lives with daughter w/ PMH DM on insulin, HTN, HLD, CAD, 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 Pt with sharp pain on the R lateral foot. As per daughter, pt was taking tylenol which did not help his pain prompting the patient to ask his daughter to take him to the hospital. Pt is a poor historian. Daughter states that the patient did not see his podiatrist after the surgery. No fever, chest, pain, palpitations, nausea, vomiting, diarrhea (17 Sep 2021 01:11)    # CASE D/W PATIENT CARE TEAM CHERELLE DIAL TO HELP ASSIST WITH PATIENT AND FAMILY'S NEEDS AT THIS TIME     # PATIENT IS S/P ICU MONITORING AND RIGHT CHEST TUBE REMOVAL ON 10/15/2021     # COVID EXPOSURE ON 10/13 AND ONCE MORE HAD RE-EXPOSURE ON 10/22 - INFECTION CONTROL IS AWARE AND ADDRESSING - ON CONTACT AND DROPLET ISOLATION PRECAUTION; F/U COVID PCR    # SUSPECT RIGHT FOOT OSTEOMYELITIS S/P RIGHT 5TH RAY RESECTION [8/19] AND S/P DEBRIDEMENT, GRAFT APPLICATION, BONE BX AND WOUND VAC APPLICATION 9/22 - BONE BX W/ ACUTE OSTEOMYELITIS AND NECROTIC PERIOSTEAL TISSUE  ** RECENT ADMISSION FOR RIGHT DIABETIC FOOT ULCER, CELLULITIS, OSTEOMYELITIS RIGHT 5th METATARSAL S/P RIGHT 5TH PARTIAL RAY AMPUTATION [8/20] - BONE PATHOLOGY W/ CLEAN MARGINS    - S/P VANCOMYCIN AND ZOSYN ; NOW ON UNASYN AND LEVAQUIN GIVEN OREN; PER ID SWITCH TO ZOSYN UNTIL 11/3   - RECENTLY HAD SIMON W/ MILD PAD  - REVIEWED WOUND CX [ ENTEROCOCCUS, AEROMONAS, KLEBSIELLA] AND BCX  - ID CONSULT, PODIATRY CONSULT    - PICC LINE PLACED TO COMPLETE 6 WEEKS OF ANTIBIOTICS - ON ZOSYN UNTIL 11/3 ON D/C    # ACUTE HYPOXIC RESPIRATORY FAILURE DUE TO ACUTE ON CHRONIC SYSTOLIC CHF  (  LV EF 40- 45 % ) , DIASTOLIC LV DYSFUNCTION , ,  SEVERE AORTIC STENOSIS & MODERATE MITRAL REGURGITATION  &  ASPIRATION PNA;  - S/P CHEST TUBE INSERTION AND REMOVAL  , S/P LASIX ,   CARDIOLOGY CONSULT IN PROGRESS      - CHEST TUBE PLACED [10/8] - FLUID CONSISTENT WITH TRANSUDATIVE PROCESS  - S/P EXTUBATION 10/13  - S/P CHEST TUBE REMOVAL 10/15      # SEPTIC SHOCK - ? S/T HYPOVOLEMIA VS. SEPSIS - S/P CVC [SWITCHED FROM FEMORAL TO LEFT IJ], S/P VASOPRESSORS - RESOLVING  - BCX - NGTD  - ON MEROPENEM      # TRANSIENT NEW ONSET A.FIB, PAROXYSMAL - ON ELIQUIS, CARDIOLOGY CONSULT IN PROGRESS    # FUNGURIA - D/C FLUCONAZOLE GIVEN TRANSAMINITIS    # TRANSAMINITIS - SUSPECT THIS IS ISCHEMIC HEPATITIS - IMPROVING - HEPATOLOGY CONSULT IN PROGRESS    # BOWEL DISTENTION - ? ILEUS - OPTIMIZING ELECTROLYTES - IMPROVED  - SURGERY CONSULT IN PROGRESS    # CHOLELITHIASIS - TRENDING LFTS, RUQ U/S - CHOLELITHIASIS, S/P SURGERY CONSULT   - S/P GI CONSULT - LESS SUSPICIOUS FOR CHOLECYSTITIS    # DYSPEPSIA - PLACED ON PPI BID WITH IMPROVEMENT, UNDERWENT ST EVAL     # ELEVATED TROPONINS - NSTEMI - ? DEMAND - WILL NEED ISCHEMIC EVAL ONCE ACUTE INFECTION RESOLVES PER CARDIOLOGY, CARDIOLOGY CONSULT IN PROGRESS  - ON ASA, STATIN, BB    # OREN ON CKD - S/T ATN AND URINARY RETENTION - S/P IVF, NOW W/ CASTAÑEDA, NEPHROLOGY CONUSLT IN PROGRESS  -  BPH ON FLOMAX, AND FINASTERIDE  - FAILED TOV WITH WORSENING RENAL FXN - NOTED URINARY RETENTION [10/4] - REPLACED CASTAÑEDA  - TOV 10/18 - BLADDER SCAN BID TO EVALUATE FOR URINARY RETENTION - FAILED TOV  - OVERNIGHT 10/18 - CASTAÑEDA REPLACED    # MEDICAL CLEARANCE FOR SURGERY - RCRI - 2 - 10.1 % 30 DAY RISK OF DEATH, MI OR CARDIAC ARREST  - CARDIOLOGY CONSULT     # DM -  HBA1C - 11  - LANTUS, SSI + FS    # CAD S/P CABG - PLACED ON ASA, STATIN, BB  - ECHO - SEVERE AORTIC STENOSIS, SEVERE CONCENTRIC LVH, G1DD, MILD [place]  - CARDIOLOGY CONSULT - DR. BASSETT   - MAY NEED ISCHEMIC EVAL ONCE ACUTE ILLNESS RESOLVES INCLUDING CARDIAC CATHETERIZATION    # HTN, HLD  - ON METOPROLOL, NIFEDIPINE, STATIN       #  IMPAIRED GAIT DUE TO CERVICAL & LS SPONDYLOSIS, POLY ARTHRITIS AND DIABETIC PERIPHERAL NEUROPATHY, OP VITAMIN D DEFICIENCY - ON CHOLECALCIFEROL, OBTAIN PT & OT   #  FAILURE TO THRIVE , MODERATE PROTEIN CALORIE MALNUTRITION       # CASE DISCUSSED AT LENGTH WITH PATIENT AND DAUGHTER, BIRDIE ROBERT VIA PHONE @ [phone] [10/25] - CASE DICUSSED AT LENGTH AND ALL QUESTIONS WERE ANSWERED. DAUGHTER UNDERSTOOD THAT PROGNOSIS IS GUARDED.  # PATIENT AND DAUGHTER REFUSED [facility] ON PREVIOUS ADMISSION, PREVIOUSLY WISHED FOR PATIENT RETURN HOME W/ HOME PT; HOWEVER NOW, DAUGHTER WISHES FOR PATIENT TO GO TO [facility], AS PATIENT'S WIFE IS CURRENTLY ADMITTED THERE    # GI AND DVT PPX   Patient is a 76y old  Male who presents with a chief complaint of R Foot Pain (28 Oct 2021 16:57)    PATIENT IS SEEN AND EXAMINED IN MEDICAL FLOOR.    ALLERGIES:  No Known Allergies      VITALS:    Vital Signs Last 24 Hrs  T(C): 36.4 (29 Oct 2021 05:42), Max: 36.4 (28 Oct 2021 21:29)  T(F): 97.5 (29 Oct 2021 05:42), Max: 97.6 (28 Oct 2021 21:29)  HR: 82 (29 Oct 2021 05:42) (68 - 82)  BP: 115/66 (29 Oct 2021 05:42) (115/66 - 144/68)  BP(mean): --  RR: 18 (29 Oct 2021 05:42) (17 - 18)  SpO2: 100% (29 Oct 2021 05:42) (100% - 100%)    LABS:    CBC Full  -  ( 29 Oct 2021 06:09 )  WBC Count : 5.03 K/uL  RBC Count : 3.34 M/uL  Hemoglobin : 9.9 g/dL  Hematocrit : 30.4 %  Platelet Count - Automated : 302 K/uL  Mean Cell Volume : 91.0 fl  Mean Cell Hemoglobin : 29.6 pg  Mean Cell Hemoglobin Concentration : 32.6 gm/dL  Auto Neutrophil # : x  Auto Lymphocyte # : x  Auto Monocyte # : x  Auto Eosinophil # : x  Auto Basophil # : x  Auto Neutrophil % : x  Auto Lymphocyte % : x  Auto Monocyte % : x  Auto Eosinophil % : x  Auto Basophil % : x      10-29    140  |  106  |  10  ----------------------------<  117<H>  3.8   |  25  |  1.19    Ca    9.2      29 Oct 2021 06:09      CAPILLARY BLOOD GLUCOSE      POCT Blood Glucose.: 116 mg/dL (29 Oct 2021 07:56)  POCT Blood Glucose.: 115 mg/dL (28 Oct 2021 21:57)  POCT Blood Glucose.: 113 mg/dL (28 Oct 2021 16:55)  POCT Blood Glucose.: 116 mg/dL (28 Oct 2021 11:42)          Creatinine Trend: 1.19<--, 1.25<--, 1.17<--, 1.22<--, 1.07<--, 0.84<--  I&O's Summary    28 Oct 2021 07:01  -  29 Oct 2021 07:00  --------------------------------------------------------  IN: 0 mL / OUT: 1031 mL / NET: -1031 mL            .Body Fluid Pleural Fluid  10-08 @ 18:51   No growth at 1 week.  --  --      .Body Fluid Pleural Fluid  10-08 @ 18:34   No growth at 5 days  --    polymorphonuclear leukocytes seen  No organisms seen  by cytocentrifuge      .Tissue Right 5th toe r/o osteomyeltis  09-22 @ 13:54   No growth at 5 days  --    No polymorphonuclear cells seen per low power field  No organisms seen per oil power field      .Blood Blood-Venous  09-17 @ 10:28   No Growth Final  --  --      .Surgical Swab right foot wound  09-16 @ 21:42   Culture yields >4 types of aerobic and/or anaerobic bacteria  Call client services within 7 days if further workup is clinically  indicated. Culture includes  Rare Aeromonas hydrophila/caviae  Few Klebsiella oxytoca/Raoutella ornithinolytica  Few Enterococcus faecalis  Few Streptococcus agalactiae (Group B) isolated  Group B streptococci are susceptible to ampicillin,  penicillin and cefazolin, but may be resistant to  erythromycin and clindamycin.  Recommendations for intrapartum prophylaxis for Group B  streptococci are penicillin or ampicillin.  --  Aeromonas hydrophila/caviae  Klebsiella oxytoca /Raoutella ornithinolytica  Enterococcus faecalis      Bone R 5th metatarsal bone clean ma  08-20 @ 03:59   No growth at 5 days  --    No polymorphonuclear leukocytes seen per low power field  No organisms seen per oil power field      .Blood Blood-Venous  08-14 @ 21:09   No Growth Final  --  --      .Surgical Swab Right foot plantar wound  08-14 @ 21:08   Moderate Streptococcus agalactiae (Group B) isolated  Group B streptococci are susceptible to ampicillin,  penicillin and cefazolin, but may be resistant to  erythromycin and clindamycin.  Recommendations for intrapartum prophylaxis for Group B  streptococci are penicillin or ampicillin.  Moderate Bacteroides fragilis "Susceptibilities not performed"  Normal skin filiberto isolated  --  --          MEDICATIONS:    MEDICATIONS  (STANDING):  apixaban 5 milliGRAM(s) Oral every 12 hours  aspirin  chewable 81 milliGRAM(s) Oral daily  atorvastatin 80 milliGRAM(s) Oral at bedtime  chlorhexidine 2% Cloths 1 Application(s) Topical <User Schedule>  collagenase Ointment 1 Application(s) Topical daily  cyanocobalamin 1000 MICROGram(s) Oral daily  dextrose 5%. 1000 milliLiter(s) (100 mL/Hr) IV Continuous <Continuous>  ergocalciferol 54153 Unit(s) Oral <User Schedule>  ferrous    sulfate Liquid 300 milliGRAM(s) Enteral Tube daily  finasteride 5 milliGRAM(s) Oral daily  gabapentin 100 milliGRAM(s) Oral three times a day  glucagon  Injectable 1 milliGRAM(s) IntraMuscular once  insulin lispro (ADMELOG) corrective regimen sliding scale   SubCutaneous Before meals and at bedtime  metoprolol tartrate 25 milliGRAM(s) Oral two times a day  NIFEdipine XL 60 milliGRAM(s) Oral daily  pantoprazole  Injectable 40 milliGRAM(s) IV Push at bedtime  piperacillin/tazobactam IVPB.. 3.375 Gram(s) IV Intermittent every 8 hours      MEDICATIONS  (PRN):  acetaminophen   Tablet .. 650 milliGRAM(s) Oral every 6 hours PRN Temp greater or equal to 38C (100.4F), Moderate Pain (4 - 6)  ondansetron Injectable 4 milliGRAM(s) IV Push three times a day PRN Nausea and/or Vomiting  sodium chloride 0.9% lock flush 10 milliLiter(s) IV Push every 1 hour PRN Pre/post blood products, medications, blood draw, and to maintain line patency      REVIEW OF SYSTEMS:                           ALL ROS DONE [ X   ]    CONSTITUTIONAL:  LETHARGIC [   ], FEVER [   ], UNRESPONSIVE [   ]  CVS:  CP  [   ], SOB, [   ], PALPITATIONS [   ], DIZZYNESS [   ]  RS: COUGH [   ], SPUTUM [   ]  GI: ABDOMINAL PAIN [   ], NAUSEA [   ], VOMITINGS [   ], DIARRHEA [   ], CONSTIPATION [   ]  :  DYSURIA [   ], NOCTURIA [   ], INCREASED FREQUENCY [   ], DRIBLING [   ],  SKELETAL: PAINFUL JOINTS [   ], SWOLLEN JOINTS [   ], NECK ACHE [   ], LOW BACK ACHE [   ],  SKIN : ULCERS [   ], RASH [   ], ITCHING [   ]  CNS: HEAD ACHE [   ], DOUBLE VISION [   ], BLURRED VISION [   ], AMS / CONFUSION [   ], SEIZURES [   ], WEAKNESS [   ],TINGLING / NUMBNESS [   ]      PHYSICAL EXAMINATION:    GENERAL APPEARANCE: NO DISTRESS  HEENT:  NO PALLOR, NO  JVD,  NO   NODES, NECK SUPPLE  CVS: S1 +, S2 +,   RS: AEEB,  OCCASIONAL  RALES +,  RONCHI +  ABD: SOFT, NT, NO, BS +       EXT: NO PE  SKIN: WARM,   RIGHT FOOT WRAPPED  SKELETAL:  ROM ACCEPTABLE  CNS:  AAO X  2-3        RADIOLOGY :    < from: Transthoracic Echocardiogram (10.07.21 @ 15:26) >  CONCLUSIONS:  1. Normal mitral valve. Moderate mitral regurgitation.  2. Calcified aortic valve with decreased opening, cannot  exclude bicuspid. Peak transaortic valve gradient equals  32.4 mm Hg, mean transaortic valve gradient equals 19 mm  Hg, dimensionless index 0.22, estimated aortic valve area  equals 0.6sqcm (by continuity equation), consistent with  paradoxical low-flow low-gradient severe aortic stenosis.  Consider dobutamine stress echocardiogram and/or SABIHA for  further evaluation.  No aortic valve regurgitation seen.  3. Normal aortic root.  4. Normal left atrium.  5. Moderate concentric left ventricular hypertrophy.  6. Moderate global left ventricular systolic dysfunction  (EF 40-45% by visual estimation).  7. Grade II diastolic dysfunction (moderate).  8. Normal right atrium.  9. Normal right ventricular size with decreased RV systolic  function (TAPSE 1.2 cm).  10. RV systolic pressure is borderline normal at  34 mm Hg.  11. Tricuspid valve not well seen. Trace tricuspid  regurgitation.  12. Normal pulmonic valve. Trace pulmonic insufficiency is  noted.  13. No pericardial effusion.  14. Bilateral pleural effusions.    < end of copied text >      EXAM:  CT ABDOMEN AND PELVIS OC                            PROCEDURE DATE:  09/27/2021          INTERPRETATION:  CLINICAL INFORMATION: Small bowel obstruction.    COMPARISON: None.    CONTRAST/COMPLICATIONS:  IV Contrast: NONE  Oral Contrast: Omnipaque 300  Complications: None reported at time of study completion    PROCEDURE:  CT of the Abdomen and Pelvis was performed.  Sagittal and coronal reformats were performed.    FINDINGS:  LOWER CHEST: Small bilateral pleural effusions with compressiveatelectasis of both lower lobes.    LIVER: Within normal limits.  BILE DUCTS: Normal caliber.  GALLBLADDER: Distended. Small layering gallstones.  SPLEEN: Within normal limits.  PANCREAS: Within normal limits.  ADRENALS: Within normal limits.  KIDNEYS/URETERS: No hydronephrosis. Nonobstructing left upper pole calculus, measuring 0.6 cm.    BLADDER: Urinary bladder contains air and a Castañeda catheter balloon.  REPRODUCTIVE ORGANS: Prostate is not enlarged.    BOWEL: No bowel obstruction. Appendix isnormal. Colonic diverticulosis.  PERITONEUM: Mild presacral fluid.  VESSELS: Atherosclerotic changes.  RETROPERITONEUM/LYMPH NODES: No lymphadenopathy.  ABDOMINAL WALL: Within normal limits.  BONES: Degenerative changes. Sternotomy.    IMPRESSION:  No acute intra-abdominal pathology.    Small bilateral pleural effusions with compressive atelectasis of both lower lobes.    ====================================================    EXAM:  MR FOOT RT                            PROCEDURE DATE:  09/17/2021          INTERPRETATION:  Clinical Information: Recent fifth metatarsal head resection now with fifth digit pain, swelling and foul discharge.    Comparison: Radiographs of the right foot from 9/16/2021 and MRI the right foot from 8/16/2021.    Technique:  MRI of the right midfoot and forefoot.  Intravenous Contrast: None.    Findings:    There is a resection at the mid aspect of the fifth metatarsal shaft. There is a lateral soft tissue wound beginning at the level of the amputation which extends distally. There is susceptibility artifact in the region of the amputation consistent with postoperative change. There is hyperintense T2 marrow signal throughout the remaining fifth metatarsal and within the adjacent fourth metatarsal head which is nonspecific and could be related to recent postoperative changes although osteomyelitis is suspected.    There is edema and mild atrophy within the plantar muscles of the foot. There is minimal spurring at the first metatarsophalangeal and hallux sesamoid articulations.    Impression:  Resection at the mid aspect of the fifth metatarsal. Lateral soft tissue wound with marrow signal abnormality throughout the fifth metatarsal and within the adjacent fourth metatarsal head which is nonspecific in the setting of recent surgery although osteomyelitis is suspected.        ASSESSMENT :     Headache    HTN (hypertension)    HLD (hyperlipidemia)    DM (diabetes mellitus)    CAD (coronary artery disease)    Glaucoma, angle-closure    [place] (hard of hearing)      S/P CABG (coronary artery bypass graft)    History of thyroid surgery        PLAN:    HPI:  76M from home, lives with daughter w/ PMH DM on insulin, HTN, HLD, CAD, 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 Pt with sharp pain on the R lateral foot. As per daughter, pt was taking tylenol which did not help his pain prompting the patient to ask his daughter to take him to the hospital. Pt is a poor historian. Daughter states that the patient did not see his podiatrist after the surgery. No fever, chest, pain, palpitations, nausea, vomiting, diarrhea (17 Sep 2021 01:11)    # CASE D/W PATIENT CARE TEAM CHERELLE DIAL TO HELP ASSIST WITH PATIENT AND FAMILY'S NEEDS AT THIS TIME     # PATIENT IS S/P ICU MONITORING AND RIGHT CHEST TUBE REMOVAL ON 10/15/2021     # COVID EXPOSURE ON 10/13 AND ONCE MORE HAD RE-EXPOSURE ON 10/22 - INFECTION CONTROL IS AWARE AND ADDRESSING - ON CONTACT AND DROPLET ISOLATION PRECAUTION; F/U COVID PCR ON 10/30 AND 11/1 - IF NEGATIVE D/C ISOLATION    # RIGHT FOOT OSTEOMYELITIS S/P RIGHT 5TH RAY RESECTION [8/19] AND S/P DEBRIDEMENT, GRAFT APPLICATION, BONE BX AND WOUND VAC APPLICATION 9/22 - BONE BX W/ ACUTE OSTEOMYELITIS AND NECROTIC PERIOSTEAL TISSUE  ** RECENT ADMISSION FOR RIGHT DIABETIC FOOT ULCER, CELLULITIS, OSTEOMYELITIS RIGHT 5th METATARSAL S/P RIGHT 5TH PARTIAL RAY AMPUTATION [8/20] - BONE PATHOLOGY W/ CLEAN MARGINS    - S/P VANCOMYCIN AND ZOSYN ; NOW ON UNASYN AND LEVAQUIN GIVEN OREN; PER ID SWITCH TO ZOSYN UNTIL 11/3   - RECENTLY HAD SIMON W/ MILD PAD  - REVIEWED WOUND CX [ ENTEROCOCCUS, AEROMONAS, KLEBSIELLA] AND BCX  - ID CONSULT, PODIATRY CONSULT    - PICC LINE PLACED TO COMPLETE 6 WEEKS OF ANTIBIOTICS - ON ZOSYN UNTIL 11/3 ON D/C    # ACUTE HYPOXIC RESPIRATORY FAILURE DUE TO ACUTE ON CHRONIC SYSTOLIC CHF  (  LV EF 40- 45 % ) , DIASTOLIC LV DYSFUNCTION , ,  SEVERE AORTIC STENOSIS & MODERATE MITRAL REGURGITATION  &  ASPIRATION PNA;  - S/P CHEST TUBE INSERTION AND REMOVAL  , S/P LASIX ,   CARDIOLOGY CONSULT IN PROGRESS      - CHEST TUBE PLACED [10/8] - FLUID CONSISTENT WITH TRANSUDATIVE PROCESS  - S/P EXTUBATION 10/13  - S/P CHEST TUBE REMOVAL 10/15      # SEPTIC SHOCK - ? S/T HYPOVOLEMIA VS. SEPSIS - S/P CVC [SWITCHED FROM FEMORAL TO LEFT IJ], S/P VASOPRESSORS - RESOLVING  - BCX - NGTD  - ON MEROPENEM      # TRANSIENT NEW ONSET A.FIB, PAROXYSMAL - ON ELIQUIS, CARDIOLOGY CONSULT IN PROGRESS    # FUNGURIA - D/C FLUCONAZOLE GIVEN TRANSAMINITIS    # TRANSAMINITIS - SUSPECT THIS IS ISCHEMIC HEPATITIS - IMPROVING - HEPATOLOGY CONSULT IN PROGRESS    # BOWEL DISTENTION - ? ILEUS - OPTIMIZING ELECTROLYTES - IMPROVED  - SURGERY CONSULT IN PROGRESS    # CHOLELITHIASIS - TRENDING LFTS, RUQ U/S - CHOLELITHIASIS, S/P SURGERY CONSULT   - S/P GI CONSULT - LESS SUSPICIOUS FOR CHOLECYSTITIS    # DYSPEPSIA - PLACED ON PPI BID WITH IMPROVEMENT, UNDERWENT ST EVAL     # ELEVATED TROPONINS - NSTEMI - ? DEMAND - WILL NEED ISCHEMIC EVAL ONCE ACUTE INFECTION RESOLVES PER CARDIOLOGY, CARDIOLOGY CONSULT IN PROGRESS  - ON ASA, STATIN, BB    # OREN ON CKD - S/T ATN AND URINARY RETENTION - IMPROVING - S/P IVF, NOW W/ CASTAÑEDA, NEPHROLOGY CONUSLT IN PROGRESS  -  BPH ON FLOMAX, AND FINASTERIDE  - FAILED TOV WITH WORSENING RENAL FXN - NOTED URINARY RETENTION [10/4] - REPLACED CASTAÑEDA  - TOV 10/18 - BLADDER SCAN BID TO EVALUATE FOR URINARY RETENTION - FAILED TOV  - OVERNIGHT 10/18 - CASTAÑEDA REPLACED    # MEDICAL CLEARANCE FOR SURGERY - RCRI - 2 - 10.1 % 30 DAY RISK OF DEATH, MI OR CARDIAC ARREST  - CARDIOLOGY CONSULT     # DM -  HBA1C - 11  - LANTUS, SSI + FS    # CAD S/P CABG - PLACED ON ASA, STATIN, BB  - ECHO - SEVERE AORTIC STENOSIS, SEVERE CONCENTRIC LVH, G1DD, MILD NC  - CARDIOLOGY CONSULT - DR. BASSETT   - MAY NEED ISCHEMIC EVAL ONCE ACUTE ILLNESS RESOLVES INCLUDING CARDIAC CATHETERIZATION    # HTN, HLD  - ON METOPROLOL, NIFEDIPINE, STATIN       #  IMPAIRED GAIT DUE TO CERVICAL & LS SPONDYLOSIS, POLY ARTHRITIS AND DIABETIC PERIPHERAL NEUROPATHY, OP VITAMIN D DEFICIENCY - ON CHOLECALCIFEROL, OBTAIN PT & OT   #  FAILURE TO THRIVE , MODERATE PROTEIN CALORIE MALNUTRITION       # CASE DISCUSSED AT LENGTH WITH PATIENT AND DAUGHTER, BIRDIE ROBERT VIA PHONE @ [phone] [10/25] - CASE DICUSSED AT LENGTH AND ALL QUESTIONS WERE ANSWERED. DAUGHTER UNDERSTOOD THAT PROGNOSIS IS GUARDED.  # PATIENT AND DAUGHTER REFUSED [facility] ON PREVIOUS ADMISSION, PREVIOUSLY WISHED FOR PATIENT RETURN HOME W/ HOME PT; HOWEVER NOW, DAUGHTER WISHES FOR PATIENT TO GO TO [facility], AS PATIENT'S WIFE IS CURRENTLY ADMITTED THERE    # GI AND DVT PPX

## 2021-10-29 NOTE — PROGRESS NOTE ADULT - SUBJECTIVE AND OBJECTIVE BOX
NP Note discussed with  Primary Attending    INTERVAL HPI/OVERNIGHT EVENTS: no new complaints    MEDICATIONS  (STANDING):  apixaban 5 milliGRAM(s) Oral every 12 hours  aspirin  chewable 81 milliGRAM(s) Oral daily  atorvastatin 80 milliGRAM(s) Oral at bedtime  chlorhexidine 2% Cloths 1 Application(s) Topical <User Schedule>  collagenase Ointment 1 Application(s) Topical daily  cyanocobalamin 1000 MICROGram(s) Oral daily  dextrose 5%. 1000 milliLiter(s) (100 mL/Hr) IV Continuous <Continuous>  ergocalciferol 42865 Unit(s) Oral <User Schedule>  ferrous    sulfate Liquid 300 milliGRAM(s) Enteral Tube daily  finasteride 5 milliGRAM(s) Oral daily  gabapentin 100 milliGRAM(s) Oral three times a day  glucagon  Injectable 1 milliGRAM(s) IntraMuscular once  insulin lispro (ADMELOG) corrective regimen sliding scale   SubCutaneous Before meals and at bedtime  metoprolol tartrate 25 milliGRAM(s) Oral two times a day  NIFEdipine XL 60 milliGRAM(s) Oral daily  piperacillin/tazobactam IVPB.. 3.375 Gram(s) IV Intermittent every 8 hours    MEDICATIONS  (PRN):  acetaminophen   Tablet .. 650 milliGRAM(s) Oral every 6 hours PRN Temp greater or equal to 38C (100.4F), Moderate Pain (4 - 6)  ondansetron Injectable 4 milliGRAM(s) IV Push three times a day PRN Nausea and/or Vomiting  sodium chloride 0.9% lock flush 10 milliLiter(s) IV Push every 1 hour PRN Pre/post blood products, medications, blood draw, and to maintain line patency      __________________________________________________  REVIEW OF SYSTEMS:    CONSTITUTIONAL: No fever,   EYES: no acute visual disturbances  NECK: No pain or stiffness  RESPIRATORY: No cough; No shortness of breath  CARDIOVASCULAR: No chest pain, no palpitations  GASTROINTESTINAL: No pain. No nausea or vomiting; No diarrhea   NEUROLOGICAL: No headache or numbness, no tremors  MUSCULOSKELETAL: No joint pain, no muscle pain  GENITOURINARY: no dysuria, no frequency, no hesitancy  PSYCHIATRY: no depression , no anxiety  ALL OTHER  ROS negative        Vital Signs Last 24 Hrs  T(C): 36.4 (29 Oct 2021 05:42), Max: 36.4 (28 Oct 2021 21:29)  T(F): 97.5 (29 Oct 2021 05:42), Max: 97.6 (28 Oct 2021 21:29)  HR: 82 (29 Oct 2021 05:42) (77 - 82)  BP: 115/66 (29 Oct 2021 05:42) (115/66 - 136/69)  BP(mean): --  RR: 18 (29 Oct 2021 05:42) (18 - 18)  SpO2: 100% (29 Oct 2021 05:42) (100% - 100%)    ________________________________________________  PHYSICAL EXAM:  GENERAL: NAD  HEENT: Normocephalic;  conjunctivae and sclerae clear; moist mucous membranes;   NECK : supple  CHEST/LUNG: Clear to auscultation bilaterally with good air entry   HEART: S1 S2  regular; no murmurs, gallops or rubs  ABDOMEN: Soft, Nontender, Nondistended; Bowel sounds present  EXTREMITIES: no cyanosis; no edema; no calf tenderness  SKIN: warm and dry; no rash, right foot dressing c/d/i   NERVOUS SYSTEM:  Awake and alert; Oriented  to person and place; no new deficits    _________________________________________________  LABS:                        9.9    5.03  )-----------( 302      ( 29 Oct 2021 06:09 )             30.4     10-29    140  |  106  |  10  ----------------------------<  117<H>  3.8   |  25  |  1.19    Ca    9.2      29 Oct 2021 06:09          CAPILLARY BLOOD GLUCOSE      POCT Blood Glucose.: 115 mg/dL (29 Oct 2021 11:33)  POCT Blood Glucose.: 116 mg/dL (29 Oct 2021 07:56)  POCT Blood Glucose.: 115 mg/dL (28 Oct 2021 21:57)  POCT Blood Glucose.: 113 mg/dL (28 Oct 2021 16:55)        RADIOLOGY & ADDITIONAL TESTS:    Imaging  Reviewed:  YES    Consultant(s) Notes Reviewed:   YES      Plan of care was discussed with patient and /or primary care giver; all questions and concerns were addressed

## 2021-10-29 NOTE — PROGRESS NOTE ADULT - ASSESSMENT
A:   s/p R 5th ray resection - 8/19  s/p R 5th wound debridement, graft application, bone biopsy and wound vac application 9/22       P:  Pt seen and evaluated   Right foot wound evaluated - no signs of infection noted today   Applied adaptic to the right dorsal wound  Applied adaptic and santyl to the lateral aspect of the right foot   Dressed right foot with DSD   Continue abx per ID recommendation  Continue offloading lower extremities in CAIR boots   Pod plan: Pt stable from podiatry standpoint  Continue with LWC  WCO: santyl, 4x4 gauze, rebecca and ACE to the right foot   Will follow while in house  Upon discharge, pt to f/u outpatient in clinic [address] Please call [phone] to make an appt   Discussed with attending Dr. Vazquez and seen bedside with Dr. Lara A:   s/p R 5th ray resection - 8/19  s/p R 5th wound debridement, graft application, bone biopsy and wound vac application 9/22       P:  Pt seen and evaluated   Right foot wound evaluated - no signs of infection noted today   Applied adaptic to the right dorsal wound  Applied adaptic and santyl to the lateral aspect of the right foot   Dressed right foot with DSD   Continue abx per ID recommendation  Continue offloading lower extremities in CAIR boots   Pod plan: Pt stable from podiatry standpoint  Continue with LWC  Upon DC,   WCO: Apply santyl to the lateral wound of the right foot, apply adaptic, 4x4 gauze and dress with rebecca and ACE bandage   Recommend pt to f/u outpatient in clinic [address]. Please call [phone] to make an appt   Discussed and evaluated at bedside attending Dr. Lara

## 2021-10-29 NOTE — PHARMACOTHERAPY INTERVENTION NOTE - COMMENTS
Recommended switching pantoprazole from IV to Suspension because patient can take medication via NG tube.
Recommended ordering a vancomycin trough prior to the administration of the 3rd dose based on a Q24hrs dosing regimen.
IV Protonix, day #5, recommended po switch as diet advanced and pt qualifies
Recommended to obtain triglyceride levels during the use of propofol.

## 2021-10-29 NOTE — PROGRESS NOTE ADULT - SUBJECTIVE AND OBJECTIVE BOX
C A R D I O L O G Y  **********************************    DATE OF SERVICE: 10-29-21    Patient denies chest pain or shortness of breath.   Review of symptoms otherwise negative.    acetaminophen   Tablet .. 650 milliGRAM(s) Oral every 6 hours PRN  apixaban 5 milliGRAM(s) Oral every 12 hours  aspirin  chewable 81 milliGRAM(s) Oral daily  atorvastatin 80 milliGRAM(s) Oral at bedtime  chlorhexidine 2% Cloths 1 Application(s) Topical <User Schedule>  collagenase Ointment 1 Application(s) Topical daily  cyanocobalamin 1000 MICROGram(s) Oral daily  dextrose 5%. 1000 milliLiter(s) IV Continuous <Continuous>  ergocalciferol 24127 Unit(s) Oral <User Schedule>  ferrous    sulfate Liquid 300 milliGRAM(s) Enteral Tube daily  finasteride 5 milliGRAM(s) Oral daily  gabapentin 100 milliGRAM(s) Oral three times a day  glucagon  Injectable 1 milliGRAM(s) IntraMuscular once  insulin lispro (ADMELOG) corrective regimen sliding scale   SubCutaneous Before meals and at bedtime  metoprolol tartrate 25 milliGRAM(s) Oral two times a day  NIFEdipine XL 60 milliGRAM(s) Oral daily  ondansetron Injectable 4 milliGRAM(s) IV Push three times a day PRN  pantoprazole  Injectable 40 milliGRAM(s) IV Push at bedtime  piperacillin/tazobactam IVPB.. 3.375 Gram(s) IV Intermittent every 8 hours  sodium chloride 0.9% lock flush 10 milliLiter(s) IV Push every 1 hour PRN                            9.9    5.03  )-----------( 302      ( 29 Oct 2021 06:09 )             30.4       Hemoglobin: 9.9 g/dL (10-29 @ 06:09)  Hemoglobin: 9.1 g/dL (10-27 @ 06:23)  Hemoglobin: 9.6 g/dL (10-26 @ 18:41)  Hemoglobin: 9.7 g/dL (10-25 @ 08:37)      10-29    140  |  106  |  10  ----------------------------<  117<H>  3.8   |  25  |  1.19    Ca    9.2      29 Oct 2021 06:09      Creatinine Trend: 1.19<--, 1.25<--, 1.17<--, 1.22<--, 1.07<--, 0.84<--    COAGS:           T(C): 36.4 (10-29-21 @ 05:42), Max: 36.4 (10-28-21 @ 21:29)  HR: 82 (10-29-21 @ 05:42) (68 - 82)  BP: 115/66 (10-29-21 @ 05:42) (115/66 - 144/68)  RR: 18 (10-29-21 @ 05:42) (17 - 18)  SpO2: 100% (10-29-21 @ 05:42) (100% - 100%)  Wt(kg): --    I&O's Summary    28 Oct 2021 07:01  -  29 Oct 2021 07:00  --------------------------------------------------------  IN: 0 mL / OUT: 1031 mL / NET: -1031 mL        HEENT:  (-)icterus (-)pallor  CV: N S1 S2 1/6 [place] (+)2 Pulses B/l  Resp:  Coarse sounds B/L, normal effort  GI: (+) BS Soft, NT, ND  Lymph:  (-)Edema, (-)obvious lymphadenopathy  Skin: Warm to touch, Normal turgor  Psych:  appropriate mood and affect        ASSESSMENT/PLAN: 	76y  Male PMH DM on insulin, HTN, HLD, normal lV fx moderate to severe AS 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    - crt improved  -  complete course of Abx per ID   - Echo noted, Severe AS, EF 40-45%   - He will need a SABIHA and R+L heart cath when he recovers and has no active infection  - Cont medical management of CAD  - Pt. with new onset PAF now on Eliquis  - Pig tail removed on 10/16  - No need for further inpatient cardiac work up.       Zak Wilkins MD, [facility]  BEEPER [phone]

## 2021-10-29 NOTE — PROGRESS NOTE ADULT - SUBJECTIVE AND OBJECTIVE BOX
Podiatry Interval: Pt was seen resting in bed. Pt was asleep. No signs of acute distress. Pt had a wound vac on that was removed on 10/13.     Podiatry HPI: 76 year old male with a PMHx of DM, HTN, HLD, CAD to ED presents to the ED for a Right Foot s/p 5th foot partial ray resection and fillet toe flap. Patient states that he has sharp localized non-radiating pain to the Right foot 5th metatarsal. States that after his surgery, he did not see a podiatrist and he came into the ED because he saw drainage and was having pain. Denies any constitutional symptoms of N/V/C/F/SOB. Denies any other pedal complaints at this time. Denies calf pain today, bilaterally.      Medications acetaminophen   Tablet .. 650 milliGRAM(s) Oral every 6 hours PRN  apixaban 5 milliGRAM(s) Oral every 12 hours  aspirin  chewable 81 milliGRAM(s) Oral daily  atorvastatin 80 milliGRAM(s) Oral at bedtime  chlorhexidine 2% Cloths 1 Application(s) Topical <User Schedule>  collagenase Ointment 1 Application(s) Topical daily  cyanocobalamin 1000 MICROGram(s) Oral daily  dextrose 5%. 1000 milliLiter(s) IV Continuous <Continuous>  ergocalciferol 63638 Unit(s) Oral <User Schedule>  ferrous    sulfate Liquid 300 milliGRAM(s) Enteral Tube daily  finasteride 5 milliGRAM(s) Oral daily  gabapentin 100 milliGRAM(s) Oral three times a day  glucagon  Injectable 1 milliGRAM(s) IntraMuscular once  insulin lispro (ADMELOG) corrective regimen sliding scale   SubCutaneous Before meals and at bedtime  metoprolol tartrate 25 milliGRAM(s) Oral two times a day  NIFEdipine XL 60 milliGRAM(s) Oral daily  ondansetron Injectable 4 milliGRAM(s) IV Push three times a day PRN  pantoprazole  Injectable 40 milliGRAM(s) IV Push at bedtime  piperacillin/tazobactam IVPB.. 3.375 Gram(s) IV Intermittent every 8 hours  sodium chloride 0.9% lock flush 10 milliLiter(s) IV Push every 1 hour PRN    FHFamily history of acute myocardial infarction (Father)    Family history of diabetes mellitus (Mother, Sibling)    Family history of hypertension (Mother, Sibling)    Family history of hyperlipidemia (Mother, Sibling)    ,   PMHHTN (hypertension)    HLD (hyperlipidemia)    DM (diabetes mellitus)    CAD (coronary artery disease)    Glaucoma, angle-closure    [place] (hard of hearing)       PSHNo significant past surgical history    S/P CABG (coronary artery bypass graft)    History of thyroid surgery        Labs                          9.9    5.03  )-----------( 302      ( 29 Oct 2021 06:09 )             30.4      10-29    140  |  106  |  10  ----------------------------<  117<H>  3.8   |  25  |  1.19    Ca    9.2      29 Oct 2021 06:09       Vital Signs Last 24 Hrs  T(C): 36.4 (29 Oct 2021 05:42), Max: 36.4 (28 Oct 2021 21:29)  T(F): 97.5 (29 Oct 2021 05:42), Max: 97.6 (28 Oct 2021 21:29)  HR: 82 (29 Oct 2021 05:42) (68 - 82)  BP: 115/66 (29 Oct 2021 05:42) (115/66 - 144/68)  BP(mean): --  RR: 18 (29 Oct 2021 05:42) (17 - 18)  SpO2: 100% (29 Oct 2021 05:42) (100% - 100%)  Sedimentation Rate, Erythrocyte: 85 mm/Hr (10-08-21 @ 03:52)  Sedimentation Rate, Erythrocyte: 108 mm/Hr (10-02-21 @ 07:02)         C-Reactive Protein, Serum: 53 mg/L (10-08-21 @ 12:10)  C-Reactive Protein, Serum: 43 mg/L (10-02-21 @ 14:05)  C-Reactive Protein, Serum: 45 mg/L (09-16-21 @ 23:33)  C-Reactive Protein, Serum: 85 mg/L (08-22-21 @ 21:30)  C-Reactive Protein, Serum: 67 mg/L (08-15-21 @ 11:55)   WBC Count: 5.03 K/uL (10-29-21 @ 06:09)        PHYSICAL EXAM  LE Focused:    Vasc:  DP/PT pulses were faintly palpable, bilateral. DP/PT pulses were monophasic on doppler, bilaterally. CFT<3 seconds to all digits.   Derm: Surgical site with graft in place to R 5th ray, incorporating well, granular wound bed through the graft with patches of necrotic islands noted, minimal drainage or discharge. minimal erythema and edema noted, eschar forming over the wound. Dorsal foot wound noted with tendon exposed, DTI noted to b/l heel  Neuro: Protective sensation grossly diminished, b/l  MSK: 5/5 muscle strength noted to the pedal compartments. 5th digit amputation R foot        Imaging:    3 views right foot. Prior 8/20/2021.    Status post resection of the fifth toe and distal fifth metatarsal unchanged in appearance. No focal bone lysis or unusual periosteal reaction to suggest osteomyelitis. No acute fracture or dislocation. The remainder study unchanged. No soft tissue gas.    IMPRESSION: No acute finding. No plain film evidence of osteomyelitis.      MRI R foot:     INTERPRETATION:  Clinical Information: Recent fifth metatarsal head resection now with fifth digit pain, swelling and foul discharge.    Comparison: Radiographs of the right foot from 9/16/2021 and MRI the right foot from 8/16/2021.    Technique:  MRI of the right midfoot and forefoot.  Intravenous Contrast: None.    Findings:    There is a resection at the mid aspect of the fifth metatarsal shaft. There is a lateral soft tissue wound beginning at the level of the amputation which extends distally. There is susceptibility artifact in the region of the amputation consistent with postoperative change. There is hyperintense T2 marrow signal throughout the remaining fifth metatarsal and within the adjacent fourth metatarsal head which is nonspecific and could be related to recent postoperative changes although osteomyelitis is suspected.    There is edema and mild atrophy within the plantar muscles of the foot. There is minimal spurring at the first metatarsophalangeal and hallux sesamoid articulations.    Impression:  Resection at the mid aspect of the fifth metatarsal. Lateral soft tissue wound with marrow signal abnormality throughout the fifth metatarsal and within the adjacent fourth metatarsal head which is nonspecific in the setting of recent surgery although osteomyelitis is suspected.        Culture Results:     Rare Aeromonas hydrophila/caviae   Few Klebsiella oxytoca/Raoutella ornithinolytica   Few Enterococcus faecalis   Few Streptococcus agalactiae (Group B) isolated   Group B streptococci are susceptible to ampicillin,   penicillin and cefazolin, but may be resistant to   erythromycin and clindamycin.   Recommendations for intrapartum prophylaxis for Group B   streptococci are penicillin or ampicillin. (09.16.21 @ 21:42)     Gram Stain:   No polymorphonuclear cells seen per low power field   No organisms seen per oil power field (09.22.21 @ 13:54)       Historical Values  Gram Stain:   No polymorphonuclear cells seen per low power field   No organisms seen per oil power field (09.22.21 @ 13:54)   Gram Stain:   No polymorphonuclear leukocytes seen per low power field   No organisms seen per oil power field (08.20.21 @ 03:59)

## 2021-10-29 NOTE — PROGRESS NOTE ADULT - ASSESSMENT
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  As per daughter, patient was taking tylenol which did not help his pain prompting the patient to ask his daughter to take him to the hospital. ON admission, he has no fever or Leukocytosis. The Xray of Right foot shows no Osteomyelitis but MRI of Right foot shows Lateral soft tissue wound with marrow signal abnormality throughout the fifth metatarsal which is consistent of Osteomyelitis. He has seen by Podiatry and wound culture has sent, Zosyn and Vancomycin has started. The ID consult requested to assist with further evaluation and antibiotic management.     # Right Fifth metatarsal DFU with drainage and Osteomyelitis- wound cx grew Enterococcus, Aeromonas and Klebsiella - Zosyn is the ideal to cover All organisms but kidney function is worsening, hence change to Unasyn and Levaquin until kidney function is improved - The pathology shows Bone with acute osteomyelitis and necrotic periosteal tissue.   # OREN- s/p urinary retention -s/p ibrahim catheter - s/p discontinue ACEI  # RLL pneumonia- most likely Aspiration, S/p Vomiting - On Unasyn  # Large bowel distension  # Candiduria- 9/22/21  # Pulmonary edema with bilateral moderate to large pleural effusions- on CT chest, 10/5/21- s/p intubation 10/7- s/p Right sided chest tube placement by CT team, 10/8/21  # COVID Exposure - PCR negative as of  10/11/21, 10/21/21 and re-exposed and in [place] until 11/1/21    would recommend:    1. Continue PT/ OOB to chair   2. Monitor kidney function closely while on Zosyn   3. Aspiration precaution    4.. Wound care as per Podiatry  5. Continue Zosyn until 11/3/21  X 5 more days       Attending Attestation:    Spent more than 35 minutes on total encounter, more than 50 % of the visit was spent counseling and/or coordinating care by the Attending physician.

## 2021-10-29 NOTE — PROGRESS NOTE ADULT - ASSESSMENT
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 Of note, the patient never followed up with podiatry after his procedure in August. MRI confirms suspected osteomyelitis - patient followed by podiatry and ID, patient underwent debridement and wound VAC placement and removed in OR, was treated with Unasyn and Levofloxacin, ID. Surgical pathology resulted OM, wound culture resulting Enterococcus Aeromonal and Klebsiella- treated initially with Zosyn however hospital course complicated with OREN likely mixed etiology with prerenal from active infection and pulmonary edema, intrarenal due to toxicity from IV antibiotics and post renal from urinary retention, Nephrology consulted for optimization of kidney function. IV antibiotic regimen switched to Meropenem, sensitive for organism, will need 6 weeks of IV antibiotics via PICC, until 11/3. IR guided PICC placed to Left arm.  Patient subsequently treated for Pulmonary Edema with IV lasix, Cardiology recommending SABIHA with L/R heart cath once infection clears and medically stable. Hospital course further complicated by abdominal distention and constipation for which he received lactulose and vomited. The patient had subsequent respiratory distress and it is suspected he aspirated as CXR showed possible RLL PNA. AXR shows distended loops of bowel for which NG tube was placed which the patient removed overnight 9/26 - re-attempts to replace NG tube were unsuccessful as patient was non-cooperative. Surgery consulted. CT abdomen deferred for now as patient is unstable and will not tolerate lying flat. ICU consulted given patient's deterioration in clinical status.  Patient was transferred to ICU, mechanically intubated and extubated for AHRF secondary to acute on CHF. Right pigtail placed for pleural effusion, and removed when resolved by Thoracic surgery. Patient stable and transferred to medicine floor. Patient however exposed to COVID, and being monitored for conversion. PT recommending STEVAN.  Pt is exposed to COVID on 10/23, will need to be quarantine until 11/1/21. plan for repeat PCR on 10/30.  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 Of note, the patient never followed up with podiatry after his procedure in August. MRI confirms suspected osteomyelitis - patient followed by podiatry and ID, patient underwent debridement and wound VAC placement and removed in OR, was treated with Unasyn and Levofloxacin, ID. Surgical pathology resulted OM, wound culture resulting Enterococcus Aeromonal and Klebsiella- treated initially with Zosyn however hospital course complicated with OREN likely mixed etiology with prerenal from active infection and pulmonary edema, intrarenal due to toxicity from IV antibiotics and post renal from urinary retention, Nephrology consulted for optimization of kidney function. IV antibiotic regimen switched to Meropenem, sensitive for organism, will need 6 weeks of IV antibiotics via PICC, until 11/3. IR guided PICC placed to Left arm.  Patient subsequently treated for Pulmonary Edema with IV lasix, Cardiology recommending SABIHA with L/R heart cath once infection clears and medically stable. Hospital course further complicated by abdominal distention and constipation for which he received lactulose and vomited. The patient had subsequent respiratory distress and it is suspected he aspirated as CXR showed possible RLL PNA. AXR shows distended loops of bowel for which NG tube was placed which the patient removed overnight 9/26 - re-attempts to replace NG tube were unsuccessful as patient was non-cooperative. Surgery consulted. CT abdomen deferred for now as patient is unstable and will not tolerate lying flat. ICU consulted given patient's deterioration in clinical status.  Patient was transferred to ICU, mechanically intubated and extubated for AHRF secondary to acute on CHF. Right pigtail placed for pleural effusion, and removed when resolved by Thoracic surgery. Patient stable and transferred to medicine floor. Patient however exposed to COVID, and being monitored for conversion. PT recommending STEVAN.  Pt is exposed to COVID on 10/23, will need to be quarantine until 11/1/21. plan for repeat PCR on 10/30.   Updated condition to daughter Arabella over the phone.

## 2021-10-30 LAB
GLUCOSE BLDC GLUCOMTR-MCNC: 126 MG/DL — HIGH (ref 70–99)
GLUCOSE BLDC GLUCOMTR-MCNC: 142 MG/DL — HIGH (ref 70–99)
GLUCOSE BLDC GLUCOMTR-MCNC: 147 MG/DL — HIGH (ref 70–99)
GLUCOSE BLDC GLUCOMTR-MCNC: 149 MG/DL — HIGH (ref 70–99)
SARS-COV-2 RNA SPEC QL NAA+PROBE: SIGNIFICANT CHANGE UP

## 2021-10-30 RX ADMIN — SODIUM CHLORIDE 75 MILLILITER(S): 9 INJECTION, SOLUTION INTRAVENOUS at 23:52

## 2021-10-30 RX ADMIN — PIPERACILLIN AND TAZOBACTAM 25 GRAM(S): 4; .5 INJECTION, POWDER, LYOPHILIZED, FOR SOLUTION INTRAVENOUS at 05:05

## 2021-10-30 RX ADMIN — Medication 81 MILLIGRAM(S): at 11:20

## 2021-10-30 RX ADMIN — Medication 300 MILLIGRAM(S): at 11:20

## 2021-10-30 RX ADMIN — Medication 1 APPLICATION(S): at 11:21

## 2021-10-30 RX ADMIN — PANTOPRAZOLE SODIUM 40 MILLIGRAM(S): 20 TABLET, DELAYED RELEASE ORAL at 06:06

## 2021-10-30 RX ADMIN — FINASTERIDE 5 MILLIGRAM(S): 5 TABLET, FILM COATED ORAL at 11:21

## 2021-10-30 RX ADMIN — Medication 25 MILLIGRAM(S): at 05:06

## 2021-10-30 RX ADMIN — APIXABAN 5 MILLIGRAM(S): 2.5 TABLET, FILM COATED ORAL at 17:01

## 2021-10-30 RX ADMIN — Medication 650 MILLIGRAM(S): at 13:11

## 2021-10-30 RX ADMIN — ATORVASTATIN CALCIUM 80 MILLIGRAM(S): 80 TABLET, FILM COATED ORAL at 22:04

## 2021-10-30 RX ADMIN — PIPERACILLIN AND TAZOBACTAM 25 GRAM(S): 4; .5 INJECTION, POWDER, LYOPHILIZED, FOR SOLUTION INTRAVENOUS at 13:11

## 2021-10-30 RX ADMIN — SODIUM CHLORIDE 75 MILLILITER(S): 9 INJECTION, SOLUTION INTRAVENOUS at 05:08

## 2021-10-30 RX ADMIN — Medication 25 MILLIGRAM(S): at 17:01

## 2021-10-30 RX ADMIN — GABAPENTIN 100 MILLIGRAM(S): 400 CAPSULE ORAL at 22:04

## 2021-10-30 RX ADMIN — CHLORHEXIDINE GLUCONATE 1 APPLICATION(S): 213 SOLUTION TOPICAL at 05:07

## 2021-10-30 RX ADMIN — Medication 60 MILLIGRAM(S): at 05:06

## 2021-10-30 RX ADMIN — GABAPENTIN 100 MILLIGRAM(S): 400 CAPSULE ORAL at 05:05

## 2021-10-30 RX ADMIN — GABAPENTIN 100 MILLIGRAM(S): 400 CAPSULE ORAL at 00:40

## 2021-10-30 RX ADMIN — GABAPENTIN 100 MILLIGRAM(S): 400 CAPSULE ORAL at 13:11

## 2021-10-30 RX ADMIN — APIXABAN 5 MILLIGRAM(S): 2.5 TABLET, FILM COATED ORAL at 05:06

## 2021-10-30 RX ADMIN — SODIUM CHLORIDE 75 MILLILITER(S): 9 INJECTION, SOLUTION INTRAVENOUS at 00:01

## 2021-10-30 RX ADMIN — Medication 650 MILLIGRAM(S): at 14:11

## 2021-10-30 RX ADMIN — PREGABALIN 1000 MICROGRAM(S): 225 CAPSULE ORAL at 11:20

## 2021-10-30 RX ADMIN — PIPERACILLIN AND TAZOBACTAM 25 GRAM(S): 4; .5 INJECTION, POWDER, LYOPHILIZED, FOR SOLUTION INTRAVENOUS at 22:04

## 2021-10-30 NOTE — PROGRESS NOTE ADULT - ASSESSMENT
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  As per daughter, patient was taking tylenol which did not help his pain prompting the patient to ask his daughter to take him to the hospital. ON admission, he has no fever or Leukocytosis. The Xray of Right foot shows no Osteomyelitis but MRI of Right foot shows Lateral soft tissue wound with marrow signal abnormality throughout the fifth metatarsal which is consistent of Osteomyelitis. He has seen by Podiatry and wound culture has sent, Zosyn and Vancomycin has started. The ID consult requested to assist with further evaluation and antibiotic management.     # Right Fifth metatarsal DFU with drainage and Osteomyelitis- wound cx grew Enterococcus, Aeromonas and Klebsiella - Zosyn is the ideal to cover All organisms but kidney function is worsening, hence change to Unasyn and Levaquin until kidney function is improved - The pathology shows Bone with acute osteomyelitis and necrotic periosteal tissue.   # OREN- s/p urinary retention -s/p ibrahim catheter - s/p discontinue ACEI  # RLL pneumonia- most likely Aspiration, S/p Vomiting - On Unasyn  # Large bowel distension  # Candiduria- 9/22/21  # Pulmonary edema with bilateral moderate to large pleural effusions- on CT chest, 10/5/21- s/p intubation 10/7- s/p Right sided chest tube placement by CT team, 10/8/21  # COVID Exposure - PCR negative as of  10/11/21, 10/21/21 and re-exposed and in Quarintine until 11/1/21    would recommend:    1. Continue PT/ OOB to chair   2. Monitor kidney function closely while on Zosyn   3. Aspiration precaution    4.. Wound care as per Podiatry  5. Continue Zosyn until 11/3/21  X 4 more days       Attending Attestation:    Spent more than 35 minutes on total encounter, more than 50 % of the visit was spent counseling and/or coordinating care by the Attending physician.           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  As per daughter, patient was taking tylenol which did not help his pain prompting the patient to ask his daughter to take him to the hospital. ON admission, he has no fever or Leukocytosis. The Xray of Right foot shows no Osteomyelitis but MRI of Right foot shows Lateral soft tissue wound with marrow signal abnormality throughout the fifth metatarsal which is consistent of Osteomyelitis. He has seen by Podiatry and wound culture has sent, Zosyn and Vancomycin has started. The ID consult requested to assist with further evaluation and antibiotic management.     # Right Fifth metatarsal DFU with drainage and Osteomyelitis- wound cx grew Enterococcus, Aeromonas and Klebsiella - Zosyn is the ideal to cover All organisms but kidney function is worsening, hence change to Unasyn and Levaquin until kidney function is improved - The pathology shows Bone with acute osteomyelitis and necrotic periosteal tissue.   # OREN- s/p urinary retention -s/p ibrahim catheter - s/p discontinue ACEI  # RLL pneumonia- most likely Aspiration, S/p Vomiting - On Unasyn  # Large bowel distension  # Candiduria- 9/22/21  # Pulmonary edema with bilateral moderate to large pleural effusions- on CT chest, 10/5/21- s/p intubation 10/7- s/p Right sided chest tube placement by CT team, 10/8/21  # COVID Exposure - PCR negative as of  10/11/21, 10/21/21 and re-exposed and in Quarintine until 11/1/21    would recommend:    1. Encourage patient OOB to chair   2. Monitor kidney function closely while on Zosyn   3. Aspiration precaution    4.. Wound care as per Podiatry  5. Continue Zosyn until 11/3/21  X 4 more days       Attending Attestation:    Spent more than 35 minutes on total encounter, more than 50 % of the visit was spent counseling and/or coordinating care by the Attending physician.

## 2021-10-30 NOTE — PROGRESS NOTE ADULT - SUBJECTIVE AND OBJECTIVE BOX
Patient is a 76y old  Male who presents with a chief complaint of R Foot Pain (30 Oct 2021 11:30)    PATIENT IS SEEN AND EXAMINED IN MEDICAL FLOOR.    KEENANT [    ]    MADDY [   ]      GT [   ]    ALLERGIES:  No Known Allergies      Daily     Daily Weight in k.5 (30 Oct 2021 05:21)    VITALS:    Vital Signs Last 24 Hrs  T(C): 36.4 (30 Oct 2021 05:21), Max: 36.7 (29 Oct 2021 20:18)  T(F): 97.5 (30 Oct 2021 05:21), Max: 98.1 (29 Oct 2021 20:18)  HR: 81 (30 Oct 2021 05:21) (65 - 81)  BP: 130/60 (30 Oct 2021 05:21) (117/40 - 140/69)  BP(mean): 56 (29 Oct 2021 20:18) (56 - 56)  RR: 16 (30 Oct 2021 05:21) (16 - 18)  SpO2: 100% (30 Oct 2021 05:21) (100% - 100%)    LABS:    CBC Full  -  ( 29 Oct 2021 06:09 )  WBC Count : 5.03 K/uL  RBC Count : 3.34 M/uL  Hemoglobin : 9.9 g/dL  Hematocrit : 30.4 %  Platelet Count - Automated : 302 K/uL  Mean Cell Volume : 91.0 fl  Mean Cell Hemoglobin : 29.6 pg  Mean Cell Hemoglobin Concentration : 32.6 gm/dL  Auto Neutrophil # : x  Auto Lymphocyte # : x  Auto Monocyte # : x  Auto Eosinophil # : x  Auto Basophil # : x  Auto Neutrophil % : x  Auto Lymphocyte % : x  Auto Monocyte % : x  Auto Eosinophil % : x  Auto Basophil % : x      10-29    140  |  106  |  10  ----------------------------<  117<H>  3.8   |  25  |  1.19    Ca    9.2      29 Oct 2021 06:09      CAPILLARY BLOOD GLUCOSE      POCT Blood Glucose.: 149 mg/dL (30 Oct 2021 11:15)  POCT Blood Glucose.: 147 mg/dL (30 Oct 2021 08:05)  POCT Blood Glucose.: 116 mg/dL (29 Oct 2021 20:41)  POCT Blood Glucose.: 134 mg/dL (29 Oct 2021 17:08)          Creatinine Trend: 1.19<--, 1.25<--, 1.17<--, 1.22<--, 1.07<--, 0.84<--  I&O's Summary    29 Oct 2021 07:01  -  30 Oct 2021 07:00  --------------------------------------------------------  IN: 0 mL / OUT: 650 mL / NET: -650 mL            .Body Fluid Pleural Fluid  10-08 @ 18:51   No growth at 1 week.  --  --      .Body Fluid Pleural Fluid  10-08 @ 18:34   No growth at 5 days  --    polymorphonuclear leukocytes seen  No organisms seen  by cytocentrifuge      .Tissue Right 5th toe r/o osteomyeltis   @ 13:54   No growth at 5 days  --    No polymorphonuclear cells seen per low power field  No organisms seen per oil power field      .Blood Blood-Venous   @ 10:28   No Growth Final  --  --      .Surgical Swab right foot wound   @ 21:42   Culture yields >4 types of aerobic and/or anaerobic bacteria  Call client services within 7 days if further workup is clinically  indicated. Culture includes  Rare Aeromonas hydrophila/caviae  Few Klebsiella oxytoca/Raoutella ornithinolytica  Few Enterococcus faecalis  Few Streptococcus agalactiae (Group B) isolated  Group B streptococci are susceptible to ampicillin,  penicillin and cefazolin, but may be resistant to  erythromycin and clindamycin.  Recommendations for intrapartum prophylaxis for Group B  streptococci are penicillin or ampicillin.  --  Aeromonas hydrophila/caviae  Klebsiella oxytoca /Raoutella ornithinolytica  Enterococcus faecalis      Bone R 5th metatarsal bone clean ma   @ 03:59   No growth at 5 days  --    No polymorphonuclear leukocytes seen per low power field  No organisms seen per oil power field      .Blood Blood-Venous   @ 21:09   No Growth Final  --  --      .Surgical Swab Right foot plantar wound   @ 21:08   Moderate Streptococcus agalactiae (Group B) isolated  Group B streptococci are susceptible to ampicillin,  penicillin and cefazolin, but may be resistant to  erythromycin and clindamycin.  Recommendations for intrapartum prophylaxis for Group B  streptococci are penicillin or ampicillin.  Moderate Bacteroides fragilis "Susceptibilities not performed"  Normal skin filiberto isolated  --  --          MEDICATIONS:    MEDICATIONS  (STANDING):  apixaban 5 milliGRAM(s) Oral every 12 hours  aspirin  chewable 81 milliGRAM(s) Oral daily  atorvastatin 80 milliGRAM(s) Oral at bedtime  chlorhexidine 2% Cloths 1 Application(s) Topical <User Schedule>  collagenase Ointment 1 Application(s) Topical daily  cyanocobalamin 1000 MICROGram(s) Oral daily  dextrose 5% + sodium chloride 0.45%. 1000 milliLiter(s) (75 mL/Hr) IV Continuous <Continuous>  dextrose 5%. 1000 milliLiter(s) (100 mL/Hr) IV Continuous <Continuous>  ergocalciferol 48463 Unit(s) Oral <User Schedule>  ferrous    sulfate Liquid 300 milliGRAM(s) Enteral Tube daily  finasteride 5 milliGRAM(s) Oral daily  gabapentin 100 milliGRAM(s) Oral three times a day  glucagon  Injectable 1 milliGRAM(s) IntraMuscular once  insulin lispro (ADMELOG) corrective regimen sliding scale   SubCutaneous Before meals and at bedtime  metoprolol tartrate 25 milliGRAM(s) Oral two times a day  NIFEdipine XL 60 milliGRAM(s) Oral daily  pantoprazole    Tablet 40 milliGRAM(s) Oral before breakfast  piperacillin/tazobactam IVPB.. 3.375 Gram(s) IV Intermittent every 8 hours      MEDICATIONS  (PRN):  acetaminophen   Tablet .. 650 milliGRAM(s) Oral every 6 hours PRN Temp greater or equal to 38C (100.4F), Moderate Pain (4 - 6)  ondansetron Injectable 4 milliGRAM(s) IV Push three times a day PRN Nausea and/or Vomiting  sodium chloride 0.9% lock flush 10 milliLiter(s) IV Push every 1 hour PRN Pre/post blood products, medications, blood draw, and to maintain line patency        REVIEW OF SYSTEMS:                           ALL ROS DONE [ X   ]      CONSTITUTIONAL:  LETHARGIC [   ], FEVER [   ], UNRESPONSIVE [   ]  CVS:  CP  [   ], SOB, [   ], PALPITATIONS [   ], DIZZYNESS [   ]  RS: COUGH [   ], SPUTUM [   ]  GI: ABDOMINAL PAIN [   ], NAUSEA [   ], VOMITINGS [   ], DIARRHEA [   ], CONSTIPATION [   ]  :  DYSURIA [   ], NOCTURIA [   ], INCREASED FREQUENCY [   ], DRIBLING [   ],  SKELETAL: PAINFUL JOINTS [   ], SWOLLEN JOINTS [   ], NECK ACHE [   ], LOW BACK ACHE [   ],  SKIN : ULCERS [   ], RASH [   ], ITCHING [   ]  CNS: HEAD ACHE [   ], DOUBLE VISION [   ], BLURRED VISION [   ], AMS / CONFUSION [   ], SEIZURES [   ], WEAKNESS [   ],TINGLING / NUMBNESS [   ]          PHYSICAL EXAMINATION:    GENERAL APPEARANCE: NO DISTRESS  HEENT:  NO PALLOR, NO  JVD,  NO   NODES, NECK SUPPLE  CVS: S1 +, S2 +,   RS: AEEB,  OCCASIONAL  RALES +,  RONCHI +  ABD: SOFT, NT, NO, BS +       EXT: NO PE  SKIN: WARM,   RIGHT FOOT WRAPPED  SKELETAL:  ROM ACCEPTABLE  CNS:  AAO X  2-3        RADIOLOGY :    < from: Transthoracic Echocardiogram (10.07.21 @ 15:26) >  CONCLUSIONS:  1. Normal mitral valve. Moderate mitral regurgitation.  2. Calcified aortic valve with decreased opening, cannot  exclude bicuspid. Peak transaortic valve gradient equals  32.4 mm Hg, mean transaortic valve gradient equals 19 mm  Hg, dimensionless index 0.22, estimated aortic valve area  equals 0.6sqcm (by continuity equation), consistent with  paradoxical low-flow low-gradient severe aortic stenosis.  Consider dobutamine stress echocardiogram and/or SABIHA for  further evaluation.  No aortic valve regurgitation seen.  3. Normal aortic root.  4. Normal left atrium.  5. Moderate concentric left ventricular hypertrophy.  6. Moderate global left ventricular systolic dysfunction  (EF 40-45% by visual estimation).  7. Grade II diastolic dysfunction (moderate).  8. Normal right atrium.  9. Normal right ventricular size with decreased RV systolic  function (TAPSE 1.2 cm).  10. RV systolic pressure is borderline normal at  34 mm Hg.  11. Tricuspid valve not well seen. Trace tricuspid  regurgitation.  12. Normal pulmonic valve. Trace pulmonic insufficiency is  noted.  13. No pericardial effusion.  14. Bilateral pleural effusions.    < end of copied text >      EXAM:  CT ABDOMEN AND PELVIS OC                            PROCEDURE DATE:  2021          INTERPRETATION:  CLINICAL INFORMATION: Small bowel obstruction.    COMPARISON: None.    CONTRAST/COMPLICATIONS:  IV Contrast: NONE  Oral Contrast: Omnipaque 300  Complications: None reported at time of study completion    PROCEDURE:  CT of the Abdomen and Pelvis was performed.  Sagittal and coronal reformats were performed.    FINDINGS:  LOWER CHEST: Small bilateral pleural effusions with compressiveatelectasis of both lower lobes.    LIVER: Within normal limits.  BILE DUCTS: Normal caliber.  GALLBLADDER: Distended. Small layering gallstones.  SPLEEN: Within normal limits.  PANCREAS: Within normal limits.  ADRENALS: Within normal limits.  KIDNEYS/URETERS: No hydronephrosis. Nonobstructing left upper pole calculus, measuring 0.6 cm.    BLADDER: Urinary bladder contains air and a Castañeda catheter balloon.  REPRODUCTIVE ORGANS: Prostate is not enlarged.    BOWEL: No bowel obstruction. Appendix isnormal. Colonic diverticulosis.  PERITONEUM: Mild presacral fluid.  VESSELS: Atherosclerotic changes.  RETROPERITONEUM/LYMPH NODES: No lymphadenopathy.  ABDOMINAL WALL: Within normal limits.  BONES: Degenerative changes. Sternotomy.    IMPRESSION:  No acute intra-abdominal pathology.    Small bilateral pleural effusions with compressive atelectasis of both lower lobes.    ====================================================    EXAM:  MR FOOT RT                            PROCEDURE DATE:  2021          INTERPRETATION:  Clinical Information: Recent fifth metatarsal head resection now with fifth digit pain, swelling and foul discharge.    Comparison: Radiographs of the right foot from 2021 and MRI the right foot from 2021.    Technique:  MRI of the right midfoot and forefoot.  Intravenous Contrast: None.    Findings:    There is a resection at the mid aspect of the fifth metatarsal shaft. There is a lateral soft tissue wound beginning at the level of the amputation which extends distally. There is susceptibility artifact in the region of the amputation consistent with postoperative change. There is hyperintense T2 marrow signal throughout the remaining fifth metatarsal and within the adjacent fourth metatarsal head which is nonspecific and could be related to recent postoperative changes although osteomyelitis is suspected.    There is edema and mild atrophy within the plantar muscles of the foot. There is minimal spurring at the first metatarsophalangeal and hallux sesamoid articulations.    Impression:  Resection at the mid aspect of the fifth metatarsal. Lateral soft tissue wound with marrow signal abnormality throughout the fifth metatarsal and within the adjacent fourth metatarsal head which is nonspecific in the setting of recent surgery although osteomyelitis is suspected.        ASSESSMENT :     Headache    HTN (hypertension)    HLD (hyperlipidemia)    DM (diabetes mellitus)    CAD (coronary artery disease)    Glaucoma, angle-closure    [place] (hard of hearing)      S/P CABG (coronary artery bypass graft)    History of thyroid surgery        PLAN:    HPI:  76M from home, lives with daughter w/ PMH DM on insulin, HTN, HLD, CAD, PSH R partial 5th ray amputation 2021 p/w R foot pain x1 day associated with foul smelling discharge. Pt with sharp pain on the R lateral foot. As per daughter, pt was taking tylenol which did not help his pain prompting the patient to ask his daughter to take him to the hospital. Pt is a poor historian. Daughter states that the patient did not see his podiatrist after the surgery. No fever, chest, pain, palpitations, nausea, vomiting, diarrhea (17 Sep 2021 01:11)    # CASE D/W PATIENT CARE TEAM CHERELLE DIAL TO HELP ASSIST WITH PATIENT AND FAMILY'S NEEDS AT THIS TIME     # PATIENT IS S/P ICU MONITORING AND RIGHT CHEST TUBE REMOVAL ON 10/15/2021     # COVID EXPOSURE ON 10/13 AND ONCE MORE HAD RE-EXPOSURE ON 10/22 - INFECTION CONTROL IS AWARE AND ADDRESSING - ON CONTACT AND DROPLET ISOLATION PRECAUTION; F/U COVID PCR ON 10/30 AND  - IF NEGATIVE D/C ISOLATION    # RIGHT FOOT OSTEOMYELITIS S/P RIGHT 5TH RAY RESECTION [] AND S/P DEBRIDEMENT, GRAFT APPLICATION, BONE BX AND WOUND VAC APPLICATION  - BONE BX W/ ACUTE OSTEOMYELITIS AND NECROTIC PERIOSTEAL TISSUE  ** RECENT ADMISSION FOR RIGHT DIABETIC FOOT ULCER, CELLULITIS, OSTEOMYELITIS RIGHT 5th METATARSAL S/P RIGHT 5TH PARTIAL RAY AMPUTATION [] - BONE PATHOLOGY W/ CLEAN MARGINS    - S/P VANCOMYCIN AND ZOSYN ; NOW ON UNASYN AND LEVAQUIN GIVEN OREN; PER ID SWITCH TO ZOSYN UNTIL 11/3   - RECENTLY HAD SIMON W/ MILD PAD  - REVIEWED WOUND CX [ ENTEROCOCCUS, AEROMONAS, KLEBSIELLA] AND BCX  - ID CONSULT, PODIATRY CONSULT    - PICC LINE PLACED TO COMPLETE 6 WEEKS OF ANTIBIOTICS - ON ZOSYN UNTIL 11/3 ON D/C    # ACUTE HYPOXIC RESPIRATORY FAILURE DUE TO ACUTE ON CHRONIC SYSTOLIC CHF  (  LV EF 40- 45 % ) , DIASTOLIC LV DYSFUNCTION , ,  SEVERE AORTIC STENOSIS & MODERATE MITRAL REGURGITATION  &  ASPIRATION PNA;  - S/P CHEST TUBE INSERTION AND REMOVAL  , S/P LASIX ,   CARDIOLOGY CONSULT IN PROGRESS      - CHEST TUBE PLACED [10/8] - FLUID CONSISTENT WITH TRANSUDATIVE PROCESS  - S/P EXTUBATION 10/13  - S/P CHEST TUBE REMOVAL 10/15      # SEPTIC SHOCK - ? S/T HYPOVOLEMIA VS. SEPSIS - S/P CVC [SWITCHED FROM FEMORAL TO LEFT IJ], S/P VASOPRESSORS - RESOLVING  - BCX - NGTD  - ON MEROPENEM      # TRANSIENT NEW ONSET A.FIB, PAROXYSMAL - ON ELIQUIS, CARDIOLOGY CONSULT IN PROGRESS    # FUNGURIA - D/C FLUCONAZOLE GIVEN TRANSAMINITIS    # TRANSAMINITIS - SUSPECT THIS IS ISCHEMIC HEPATITIS - IMPROVING - HEPATOLOGY CONSULT IN PROGRESS    # BOWEL DISTENTION - ? ILEUS - OPTIMIZING ELECTROLYTES - IMPROVED  - SURGERY CONSULT IN PROGRESS    # CHOLELITHIASIS - TRENDING LFTS, RUQ U/S - CHOLELITHIASIS, S/P SURGERY CONSULT   - S/P GI CONSULT - LESS SUSPICIOUS FOR CHOLECYSTITIS    # DYSPEPSIA - PLACED ON PPI BID WITH IMPROVEMENT, UNDERWENT ST EVAL     # ELEVATED TROPONINS - NSTEMI - ? DEMAND - WILL NEED ISCHEMIC EVAL ONCE ACUTE INFECTION RESOLVES PER CARDIOLOGY, CARDIOLOGY CONSULT IN PROGRESS  - ON ASA, STATIN, BB    # OREN ON CKD - S/T ATN AND URINARY RETENTION - IMPROVING - S/P IVF, NOW W/ CASTAÑEDA, NEPHROLOGY CONUSLT IN PROGRESS  -  BPH ON FLOMAX, AND FINASTERIDE  - FAILED TOV WITH WORSENING RENAL FXN - NOTED URINARY RETENTION [10/4] - REPLACED CASTAÑEDA  - TOV 10/18 - BLADDER SCAN BID TO EVALUATE FOR URINARY RETENTION - FAILED TOV  - OVERNIGHT 10/18 - CASTAÑEDA REPLACED    # MEDICAL CLEARANCE FOR SURGERY - RCRI - 2 - 10.1 % 30 DAY RISK OF DEATH, MI OR CARDIAC ARREST  - CARDIOLOGY CONSULT     # DM -  HBA1C - 11  - LANTUS, SSI + FS    # CAD S/P CABG - PLACED ON ASA, STATIN, BB  - ECHO - SEVERE AORTIC STENOSIS, SEVERE CONCENTRIC LVH, G1DD, MILD AR  - CARDIOLOGY CONSULT - DR. BASSETT   - MAY NEED ISCHEMIC EVAL ONCE ACUTE ILLNESS RESOLVES INCLUDING CARDIAC CATHETERIZATION    # HTN, HLD  - ON METOPROLOL, NIFEDIPINE, STATIN       #  IMPAIRED GAIT DUE TO CERVICAL & LS SPONDYLOSIS, POLY ARTHRITIS AND DIABETIC PERIPHERAL NEUROPATHY, OP VITAMIN D DEFICIENCY - ON CHOLECALCIFEROL, OBTAIN PT & OT   #  FAILURE TO THRIVE , MODERATE PROTEIN CALORIE MALNUTRITION       # CASE DISCUSSED AT LENGTH WITH PATIENT AND DAUGHTER, BIRDIE RBOERT VIA PHONE @ [phone] [10/25] - CASE DICUSSED AT LENGTH AND ALL QUESTIONS WERE ANSWERED. DAUGHTER UNDERSTOOD THAT PROGNOSIS IS GUARDED.  # PATIENT AND DAUGHTER REFUSED [facility] ON PREVIOUS ADMISSION, PREVIOUSLY WISHED FOR PATIENT RETURN HOME W/ HOME PT; HOWEVER NOW, DAUGHTER WISHES FOR PATIENT TO GO TO [facility], AS PATIENT'S WIFE IS CURRENTLY ADMITTED THERE    # GI AND DVT PPX    DR. JAELYN MUNSON   Patient is a 76y old  Male who presents with a chief complaint of R Foot Pain (30 Oct 2021 11:30)    PATIENT IS SEEN AND EXAMINED IN MEDICAL FLOOR.    KEENANT [    ]    MADDY [   ]      GT [   ]    ALLERGIES:  No Known Allergies      Daily     Daily Weight in k.5 (30 Oct 2021 05:21)    VITALS:    Vital Signs Last 24 Hrs  T(C): 36.4 (30 Oct 2021 05:21), Max: 36.7 (29 Oct 2021 20:18)  T(F): 97.5 (30 Oct 2021 05:21), Max: 98.1 (29 Oct 2021 20:18)  HR: 81 (30 Oct 2021 05:21) (65 - 81)  BP: 130/60 (30 Oct 2021 05:21) (117/40 - 140/69)  BP(mean): 56 (29 Oct 2021 20:18) (56 - 56)  RR: 16 (30 Oct 2021 05:21) (16 - 18)  SpO2: 100% (30 Oct 2021 05:21) (100% - 100%)    LABS:    CBC Full  -  ( 29 Oct 2021 06:09 )  WBC Count : 5.03 K/uL  RBC Count : 3.34 M/uL  Hemoglobin : 9.9 g/dL  Hematocrit : 30.4 %  Platelet Count - Automated : 302 K/uL  Mean Cell Volume : 91.0 fl  Mean Cell Hemoglobin : 29.6 pg  Mean Cell Hemoglobin Concentration : 32.6 gm/dL  Auto Neutrophil # : x  Auto Lymphocyte # : x  Auto Monocyte # : x  Auto Eosinophil # : x  Auto Basophil # : x  Auto Neutrophil % : x  Auto Lymphocyte % : x  Auto Monocyte % : x  Auto Eosinophil % : x  Auto Basophil % : x      10-29    140  |  106  |  10  ----------------------------<  117<H>  3.8   |  25  |  1.19    Ca    9.2      29 Oct 2021 06:09      CAPILLARY BLOOD GLUCOSE      POCT Blood Glucose.: 149 mg/dL (30 Oct 2021 11:15)  POCT Blood Glucose.: 147 mg/dL (30 Oct 2021 08:05)  POCT Blood Glucose.: 116 mg/dL (29 Oct 2021 20:41)  POCT Blood Glucose.: 134 mg/dL (29 Oct 2021 17:08)          Creatinine Trend: 1.19<--, 1.25<--, 1.17<--, 1.22<--, 1.07<--, 0.84<--  I&O's Summary    29 Oct 2021 07:01  -  30 Oct 2021 07:00  --------------------------------------------------------  IN: 0 mL / OUT: 650 mL / NET: -650 mL            .Body Fluid Pleural Fluid  10-08 @ 18:51   No growth at 1 week.  --  --      .Body Fluid Pleural Fluid  10-08 @ 18:34   No growth at 5 days  --    polymorphonuclear leukocytes seen  No organisms seen  by cytocentrifuge      .Tissue Right 5th toe r/o osteomyeltis   @ 13:54   No growth at 5 days  --    No polymorphonuclear cells seen per low power field  No organisms seen per oil power field      .Blood Blood-Venous   @ 10:28   No Growth Final  --  --      .Surgical Swab right foot wound   @ 21:42   Culture yields >4 types of aerobic and/or anaerobic bacteria  Call client services within 7 days if further workup is clinically  indicated. Culture includes  Rare Aeromonas hydrophila/caviae  Few Klebsiella oxytoca/Raoutella ornithinolytica  Few Enterococcus faecalis  Few Streptococcus agalactiae (Group B) isolated  Group B streptococci are susceptible to ampicillin,  penicillin and cefazolin, but may be resistant to  erythromycin and clindamycin.  Recommendations for intrapartum prophylaxis for Group B  streptococci are penicillin or ampicillin.  --  Aeromonas hydrophila/caviae  Klebsiella oxytoca /Raoutella ornithinolytica  Enterococcus faecalis      Bone R 5th metatarsal bone clean ma   @ 03:59   No growth at 5 days  --    No polymorphonuclear leukocytes seen per low power field  No organisms seen per oil power field      .Blood Blood-Venous   @ 21:09   No Growth Final  --  --      .Surgical Swab Right foot plantar wound   @ 21:08   Moderate Streptococcus agalactiae (Group B) isolated  Group B streptococci are susceptible to ampicillin,  penicillin and cefazolin, but may be resistant to  erythromycin and clindamycin.  Recommendations for intrapartum prophylaxis for Group B  streptococci are penicillin or ampicillin.  Moderate Bacteroides fragilis "Susceptibilities not performed"  Normal skin filiberto isolated  --  --          MEDICATIONS:    MEDICATIONS  (STANDING):  apixaban 5 milliGRAM(s) Oral every 12 hours  aspirin  chewable 81 milliGRAM(s) Oral daily  atorvastatin 80 milliGRAM(s) Oral at bedtime  chlorhexidine 2% Cloths 1 Application(s) Topical <User Schedule>  collagenase Ointment 1 Application(s) Topical daily  cyanocobalamin 1000 MICROGram(s) Oral daily  dextrose 5% + sodium chloride 0.45%. 1000 milliLiter(s) (75 mL/Hr) IV Continuous <Continuous>  dextrose 5%. 1000 milliLiter(s) (100 mL/Hr) IV Continuous <Continuous>  ergocalciferol 53536 Unit(s) Oral <User Schedule>  ferrous    sulfate Liquid 300 milliGRAM(s) Enteral Tube daily  finasteride 5 milliGRAM(s) Oral daily  gabapentin 100 milliGRAM(s) Oral three times a day  glucagon  Injectable 1 milliGRAM(s) IntraMuscular once  insulin lispro (ADMELOG) corrective regimen sliding scale   SubCutaneous Before meals and at bedtime  metoprolol tartrate 25 milliGRAM(s) Oral two times a day  NIFEdipine XL 60 milliGRAM(s) Oral daily  pantoprazole    Tablet 40 milliGRAM(s) Oral before breakfast  piperacillin/tazobactam IVPB.. 3.375 Gram(s) IV Intermittent every 8 hours      MEDICATIONS  (PRN):  acetaminophen   Tablet .. 650 milliGRAM(s) Oral every 6 hours PRN Temp greater or equal to 38C (100.4F), Moderate Pain (4 - 6)  ondansetron Injectable 4 milliGRAM(s) IV Push three times a day PRN Nausea and/or Vomiting  sodium chloride 0.9% lock flush 10 milliLiter(s) IV Push every 1 hour PRN Pre/post blood products, medications, blood draw, and to maintain line patency        REVIEW OF SYSTEMS:                           ALL ROS DONE [ X   ]      CONSTITUTIONAL:  LETHARGIC [   ], FEVER [   ], UNRESPONSIVE [   ]  CVS:  CP  [   ], SOB, [   ], PALPITATIONS [   ], DIZZYNESS [   ]  RS: COUGH [   ], SPUTUM [   ]  GI: ABDOMINAL PAIN [   ], NAUSEA [   ], VOMITINGS [   ], DIARRHEA [   ], CONSTIPATION [   ]  :  DYSURIA [   ], NOCTURIA [   ], INCREASED FREQUENCY [   ], DRIBLING [   ],  SKELETAL: PAINFUL JOINTS [   ], SWOLLEN JOINTS [   ], NECK ACHE [   ], LOW BACK ACHE [   ],  SKIN : ULCERS [   ], RASH [   ], ITCHING [   ]  CNS: HEAD ACHE [   ], DOUBLE VISION [   ], BLURRED VISION [   ], AMS / CONFUSION [   ], SEIZURES [   ], WEAKNESS [   ],TINGLING / NUMBNESS [   ]          PHYSICAL EXAMINATION:    GENERAL APPEARANCE: NO DISTRESS  HEENT:  NO PALLOR, NO  JVD,  NO   NODES, NECK SUPPLE  CVS: S1 +, S2 +,   RS: AEEB,  OCCASIONAL  RALES +,  RONCHI +  ABD: SOFT, NT, NO, BS +       EXT: NO PE  SKIN: WARM,   RIGHT FOOT WRAPPED  SKELETAL:  ROM ACCEPTABLE  CNS:  AAO X  2-3        RADIOLOGY :    < from: Transthoracic Echocardiogram (10.07.21 @ 15:26) >  CONCLUSIONS:  1. Normal mitral valve. Moderate mitral regurgitation.  2. Calcified aortic valve with decreased opening, cannot  exclude bicuspid. Peak transaortic valve gradient equals  32.4 mm Hg, mean transaortic valve gradient equals 19 mm  Hg, dimensionless index 0.22, estimated aortic valve area  equals 0.6sqcm (by continuity equation), consistent with  paradoxical low-flow low-gradient severe aortic stenosis.  Consider dobutamine stress echocardiogram and/or SABIHA for  further evaluation.  No aortic valve regurgitation seen.  3. Normal aortic root.  4. Normal left atrium.  5. Moderate concentric left ventricular hypertrophy.  6. Moderate global left ventricular systolic dysfunction  (EF 40-45% by visual estimation).  7. Grade II diastolic dysfunction (moderate).  8. Normal right atrium.  9. Normal right ventricular size with decreased RV systolic  function (TAPSE 1.2 cm).  10. RV systolic pressure is borderline normal at  34 mm Hg.  11. Tricuspid valve not well seen. Trace tricuspid  regurgitation.  12. Normal pulmonic valve. Trace pulmonic insufficiency is  noted.  13. No pericardial effusion.  14. Bilateral pleural effusions.    < end of copied text >      EXAM:  CT ABDOMEN AND PELVIS OC                            PROCEDURE DATE:  2021          INTERPRETATION:  CLINICAL INFORMATION: Small bowel obstruction.    COMPARISON: None.    CONTRAST/COMPLICATIONS:  IV Contrast: NONE  Oral Contrast: Omnipaque 300  Complications: None reported at time of study completion    PROCEDURE:  CT of the Abdomen and Pelvis was performed.  Sagittal and coronal reformats were performed.    FINDINGS:  LOWER CHEST: Small bilateral pleural effusions with compressiveatelectasis of both lower lobes.    LIVER: Within normal limits.  BILE DUCTS: Normal caliber.  GALLBLADDER: Distended. Small layering gallstones.  SPLEEN: Within normal limits.  PANCREAS: Within normal limits.  ADRENALS: Within normal limits.  KIDNEYS/URETERS: No hydronephrosis. Nonobstructing left upper pole calculus, measuring 0.6 cm.    BLADDER: Urinary bladder contains air and a Castañeda catheter balloon.  REPRODUCTIVE ORGANS: Prostate is not enlarged.    BOWEL: No bowel obstruction. Appendix isnormal. Colonic diverticulosis.  PERITONEUM: Mild presacral fluid.  VESSELS: Atherosclerotic changes.  RETROPERITONEUM/LYMPH NODES: No lymphadenopathy.  ABDOMINAL WALL: Within normal limits.  BONES: Degenerative changes. Sternotomy.    IMPRESSION:  No acute intra-abdominal pathology.    Small bilateral pleural effusions with compressive atelectasis of both lower lobes.    ====================================================    EXAM:  MR FOOT RT                            PROCEDURE DATE:  2021          INTERPRETATION:  Clinical Information: Recent fifth metatarsal head resection now with fifth digit pain, swelling and foul discharge.    Comparison: Radiographs of the right foot from 2021 and MRI the right foot from 2021.    Technique:  MRI of the right midfoot and forefoot.  Intravenous Contrast: None.    Findings:    There is a resection at the mid aspect of the fifth metatarsal shaft. There is a lateral soft tissue wound beginning at the level of the amputation which extends distally. There is susceptibility artifact in the region of the amputation consistent with postoperative change. There is hyperintense T2 marrow signal throughout the remaining fifth metatarsal and within the adjacent fourth metatarsal head which is nonspecific and could be related to recent postoperative changes although osteomyelitis is suspected.    There is edema and mild atrophy within the plantar muscles of the foot. There is minimal spurring at the first metatarsophalangeal and hallux sesamoid articulations.    Impression:  Resection at the mid aspect of the fifth metatarsal. Lateral soft tissue wound with marrow signal abnormality throughout the fifth metatarsal and within the adjacent fourth metatarsal head which is nonspecific in the setting of recent surgery although osteomyelitis is suspected.        ASSESSMENT :     Headache    HTN (hypertension)    HLD (hyperlipidemia)    DM (diabetes mellitus)    CAD (coronary artery disease)    Glaucoma, angle-closure    [place] (hard of hearing)      S/P CABG (coronary artery bypass graft)    History of thyroid surgery        PLAN:    HPI:  76M from home, lives with daughter w/ PMH DM on insulin, HTN, HLD, CAD, PSH R partial 5th ray amputation 2021 p/w R foot pain x1 day associated with foul smelling discharge. Pt with sharp pain on the R lateral foot. As per daughter, pt was taking tylenol which did not help his pain prompting the patient to ask his daughter to take him to the hospital. Pt is a poor historian. Daughter states that the patient did not see his podiatrist after the surgery. No fever, chest, pain, palpitations, nausea, vomiting, diarrhea (17 Sep 2021 01:11)      # REPEAT NASAL SWAB FOR COVID 19 IS NEGATIVE - WILL OBTAIN PT & OT EVALUATION AND DC PLAN HOME ON MONDAY WITH HOME CARE , IF REQUIRED    # CASE D/W PATIENT CARE TEAM CHERELLE DIAL TO HELP ASSIST WITH PATIENT AND FAMILY'S NEEDS AT THIS TIME     # PATIENT IS S/P ICU MONITORING AND RIGHT CHEST TUBE REMOVAL ON 10/15/2021     # COVID EXPOSURE ON 10/13 AND ONCE MORE HAD RE-EXPOSURE ON 10/22 - INFECTION CONTROL IS AWARE AND ADDRESSING - ON CONTACT AND DROPLET ISOLATION PRECAUTION; F/U COVID PCR ON 10/30 AND  - IF NEGATIVE D/C ISOLATION    # RIGHT FOOT OSTEOMYELITIS S/P RIGHT 5TH RAY RESECTION [] AND S/P DEBRIDEMENT, GRAFT APPLICATION, BONE BX AND WOUND VAC APPLICATION  - BONE BX W/ ACUTE OSTEOMYELITIS AND NECROTIC PERIOSTEAL TISSUE  ** RECENT ADMISSION FOR RIGHT DIABETIC FOOT ULCER, CELLULITIS, OSTEOMYELITIS RIGHT 5th METATARSAL S/P RIGHT 5TH PARTIAL RAY AMPUTATION [] - BONE PATHOLOGY W/ CLEAN MARGINS    - S/P VANCOMYCIN AND ZOSYN ; NOW ON UNASYN AND LEVAQUIN GIVEN OREN; PER ID SWITCH TO ZOSYN UNTIL 11/3   - RECENTLY HAD SIMON W/ MILD PAD  - REVIEWED WOUND CX [ ENTEROCOCCUS, AEROMONAS, KLEBSIELLA] AND BCX  - ID CONSULT, PODIATRY CONSULT    - PICC LINE PLACED TO COMPLETE 6 WEEKS OF ANTIBIOTICS - ON ZOSYN UNTIL 11/3 ON D/C    # ACUTE HYPOXIC RESPIRATORY FAILURE DUE TO ACUTE ON CHRONIC SYSTOLIC CHF  (  LV EF 40- 45 % ) , DIASTOLIC LV DYSFUNCTION , ,  SEVERE AORTIC STENOSIS & MODERATE MITRAL REGURGITATION  &  ASPIRATION PNA;  - S/P CHEST TUBE INSERTION AND REMOVAL  , S/P LASIX ,   CARDIOLOGY CONSULT IN PROGRESS      - CHEST TUBE PLACED [10/8] - FLUID CONSISTENT WITH TRANSUDATIVE PROCESS  - S/P EXTUBATION 10/13  - S/P CHEST TUBE REMOVAL 10/15      # SEPTIC SHOCK - ? S/T HYPOVOLEMIA VS. SEPSIS - S/P CVC [SWITCHED FROM FEMORAL TO LEFT IJ], S/P VASOPRESSORS - RESOLVING  - BCX - NGTD  - ON MEROPENEM      # TRANSIENT NEW ONSET A.FIB, PAROXYSMAL - ON ELIQUIS, CARDIOLOGY CONSULT IN PROGRESS    # FUNGURIA - D/C FLUCONAZOLE GIVEN TRANSAMINITIS    # TRANSAMINITIS - SUSPECT THIS IS ISCHEMIC HEPATITIS - IMPROVING - HEPATOLOGY CONSULT IN PROGRESS    # BOWEL DISTENTION - ? ILEUS - OPTIMIZING ELECTROLYTES - IMPROVED  - SURGERY CONSULT IN PROGRESS    # CHOLELITHIASIS - TRENDING LFTS, RUQ U/S - CHOLELITHIASIS, S/P SURGERY CONSULT   - S/P GI CONSULT - LESS SUSPICIOUS FOR CHOLECYSTITIS    # DYSPEPSIA - PLACED ON PPI BID WITH IMPROVEMENT, UNDERWENT ST EVAL     # ELEVATED TROPONINS - NSTEMI - ? DEMAND - WILL NEED ISCHEMIC EVAL ONCE ACUTE INFECTION RESOLVES PER CARDIOLOGY, CARDIOLOGY CONSULT IN PROGRESS  - ON ASA, STATIN, BB    # OREN ON CKD - S/T ATN AND URINARY RETENTION - IMPROVING - S/P IVF, NOW W/ CASTAÑEDA, NEPHROLOGY CONUSLT IN PROGRESS  -  BPH ON FLOMAX, AND FINASTERIDE  - FAILED TOV WITH WORSENING RENAL FXN - NOTED URINARY RETENTION [10/4] - REPLACED CASTAÑEDA  - TOV 10/18 - BLADDER SCAN BID TO EVALUATE FOR URINARY RETENTION - FAILED TOV  - OVERNIGHT 10/18 - CASTAÑEDA REPLACED    # MEDICAL CLEARANCE FOR SURGERY - RCRI - 2 - 10.1 % 30 DAY RISK OF DEATH, MI OR CARDIAC ARREST  - CARDIOLOGY CONSULT     # DM -  HBA1C - 11  - LANTUS, SSI + FS    # CAD S/P CABG - PLACED ON ASA, STATIN, BB  - ECHO - SEVERE AORTIC STENOSIS, SEVERE CONCENTRIC LVH, G1DD, MILD VT  - CARDIOLOGY CONSULT - DR. BASSETT   - MAY NEED ISCHEMIC EVAL ONCE ACUTE ILLNESS RESOLVES INCLUDING CARDIAC CATHETERIZATION    # HTN, HLD  - ON METOPROLOL, NIFEDIPINE, STATIN       #  IMPAIRED GAIT DUE TO CERVICAL & LS SPONDYLOSIS, POLY ARTHRITIS AND DIABETIC PERIPHERAL NEUROPATHY, OP VITAMIN D DEFICIENCY - ON CHOLECALCIFEROL, OBTAIN PT & OT   #  FAILURE TO THRIVE , MODERATE PROTEIN CALORIE MALNUTRITION       # CASE DISCUSSED AT LENGTH WITH PATIENT AND DAUGHTER, BIRDIE ROBERT VIA PHONE @ [phone] [10/25] - CASE DICUSSED AT LENGTH AND ALL QUESTIONS WERE ANSWERED. DAUGHTER UNDERSTOOD THAT PROGNOSIS IS GUARDED.  # PATIENT AND DAUGHTER REFUSED [facility] ON PREVIOUS ADMISSION, PREVIOUSLY WISHED FOR PATIENT RETURN HOME W/ HOME PT; HOWEVER NOW, DAUGHTER WISHES FOR PATIENT TO GO TO [facility], AS PATIENT'S WIFE IS CURRENTLY ADMITTED THERE    # GI AND DVT PPX    DR. JAELYN MUNSON

## 2021-10-30 NOTE — PROGRESS NOTE ADULT - SUBJECTIVE AND OBJECTIVE BOX
C A R D I O L O G Y  **********************************    DATE OF SERVICE: 10-30-21  States he feels fine, wants to go home, denies chest pain or shortness of breath.   Review of symptoms otherwise negative.    acetaminophen   Tablet .. 650 milliGRAM(s) Oral every 6 hours PRN  apixaban 5 milliGRAM(s) Oral every 12 hours  aspirin  chewable 81 milliGRAM(s) Oral daily  atorvastatin 80 milliGRAM(s) Oral at bedtime  chlorhexidine 2% Cloths 1 Application(s) Topical <User Schedule>  collagenase Ointment 1 Application(s) Topical daily  cyanocobalamin 1000 MICROGram(s) Oral daily  dextrose 5% + sodium chloride 0.45%. 1000 milliLiter(s) IV Continuous <Continuous>  dextrose 5%. 1000 milliLiter(s) IV Continuous <Continuous>  ergocalciferol 80522 Unit(s) Oral <User Schedule>  ferrous    sulfate Liquid 300 milliGRAM(s) Enteral Tube daily  finasteride 5 milliGRAM(s) Oral daily  gabapentin 100 milliGRAM(s) Oral three times a day  glucagon  Injectable 1 milliGRAM(s) IntraMuscular once  insulin lispro (ADMELOG) corrective regimen sliding scale   SubCutaneous Before meals and at bedtime  metoprolol tartrate 25 milliGRAM(s) Oral two times a day  NIFEdipine XL 60 milliGRAM(s) Oral daily  ondansetron Injectable 4 milliGRAM(s) IV Push three times a day PRN  pantoprazole    Tablet 40 milliGRAM(s) Oral before breakfast  piperacillin/tazobactam IVPB.. 3.375 Gram(s) IV Intermittent every 8 hours  sodium chloride 0.9% lock flush 10 milliLiter(s) IV Push every 1 hour PRN                            9.9    5.03  )-----------( 302      ( 29 Oct 2021 06:09 )             30.4       10-29    140  |  106  |  10  ----------------------------<  117<H>  3.8   |  25  |  1.19    Ca    9.2      29 Oct 2021 06:09      T(C): 36.4 (10-30-21 @ 05:21), Max: 36.7 (10-29-21 @ 20:18)  HR: 81 (10-30-21 @ 05:21) (65 - 81)  BP: 130/60 (10-30-21 @ 05:21) (117/40 - 140/69)  RR: 16 (10-30-21 @ 05:21) (16 - 18)  SpO2: 100% (10-30-21 @ 05:21) (100% - 100%)  Wt(kg): --    I&O's Summary    29 Oct 2021 07:01  -  30 Oct 2021 07:00  --------------------------------------------------------  IN: 0 mL / OUT: 650 mL / NET: -650 mL        Gen: thin  male in no acute distress, oriented x 3.  HEENT:  (-)icterus (-)pallor  CV: N S1 S2 1/6 TRINIDAD (+)2 Pulses B/l  Resp:  Coarse sounds B/L, normal effort  GI: (+) BS Soft, NT, ND  Lymph:  (-)Edema, (-)obvious lymphadenopathy  Skin: Warm to touch, Normal turgor  Psych:  appropriate mood and affect        ASSESSMENT/PLAN: 	76y  Male PMH DM on insulin, HTN, HLD, normal lV fx moderate to severe AS 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    - crt improved  -  complete course of Abx per ID   - Echo noted, Severe AS, EF 40-45%   - He will need a SABIHA and R+L heart cath when he recovers and has no active infection  - Cont medical management of CAD  - Pt. with new onset PAF now on Eliquis  - Pig tail removed on 10/16  - No need for further inpatient cardiac work up.  Patient may wish to defer invasive workup to outpatient setting.  Has been hospitalized x 6 weeks already.  Continue discussion re: timing of aortic valve workup.    Rob Wu M.D.  Cardiac Electrophysiology  [phone]

## 2021-10-30 NOTE — PROGRESS NOTE ADULT - SUBJECTIVE AND OBJECTIVE BOX
Patient is seen and examined at the bed side this Morning, is afebrile. He has no new complaints. The creatinine is trending back down.       REVIEW OF SYSTEMS: All other review systems are negative      ALLERGIES: No Known Allergies      Vital Signs Last 24 Hrs  T(C): 36.4 (29 Oct 2021 05:42), Max: 36.4 (28 Oct 2021 21:29)  T(F): 97.5 (29 Oct 2021 05:42), Max: 97.6 (28 Oct 2021 21:29)  HR: 82 (29 Oct 2021 05:42) (77 - 82)  BP: 115/66 (29 Oct 2021 05:42) (115/66 - 136/69)  BP(mean): --  RR: 18 (29 Oct 2021 05:42) (18 - 18)  SpO2: 100% (29 Oct 2021 05:42) (100% - 100%)      PHYSICAL EXAM:  GENERAL: Not in distress  CHEST/LUNG:  Not using accessory muscles, s/p removal of Right CT   HEART: s1 and s2 present  ABDOMEN:  Mild distended   EXTREMITIES: Right foot bandage in placed   CNS: Awake and alert       LABS:                           9.9    5.03  )-----------( 302      ( 29 Oct 2021 06:09 )             30.4                           9.1    4.88  )-----------( 273      ( 27 Oct 2021 06:23 )             28.0       10-29    140  |  106  |  10  ----------------------------<  117<H>  3.8   |  25  |  1.19    Ca    9.2      29 Oct 2021 06:09      10-27    140  |  109<H>  |  8   ----------------------------<  117<H>  4.2   |  24  |  1.25    Ca    8.9      27 Oct 2021 06:23    TPro  7.1  /  Alb  2.6<L>  /  TBili  0.7  /  DBili  x   /  AST  27  /  ALT  34  /  AlkPhos  125<H>  10-25        09-25    139  |  107  |  41<H>  ----------------------------<  134<H>  4.1   |  21<L>  |  4.05<H>    Ca    8.6      25 Sep 2021 06:40    TPro  6.6  /  Alb  2.3<L>  /  TBili  0.5  /  DBili  x   /  AST  25  /  ALT  15  /  AlkPhos  102  09-25        CAPILLARY BLOOD GLUCOSE  POCT Blood Glucose.: 134 mg/dL (17 Sep 2021 17:11)  POCT Blood Glucose.: 128 mg/dL (17 Sep 2021 11:06)  POCT Blood Glucose.: 157 mg/dL (17 Sep 2021 04:10)      Vancomycin Level, Trough (10.14.21 @ 11:32) : 21.8  Vancomycin Level, Trough (10.12.21 @ 12:13) : 19.5:      MEDICATIONS  (STANDING):    apixaban 5 milliGRAM(s) Oral every 12 hours  aspirin  chewable 81 milliGRAM(s) Oral daily  atorvastatin 80 milliGRAM(s) Oral at bedtime  chlorhexidine 2% Cloths 1 Application(s) Topical <User Schedule>  collagenase Ointment 1 Application(s) Topical daily  cyanocobalamin 1000 MICROGram(s) Oral daily  dextrose 5%. 1000 milliLiter(s) (100 mL/Hr) IV Continuous <Continuous>  ergocalciferol 36881 Unit(s) Oral <User Schedule>  ferrous    sulfate Liquid 300 milliGRAM(s) Enteral Tube daily  finasteride 5 milliGRAM(s) Oral daily  gabapentin 100 milliGRAM(s) Oral three times a day  glucagon  Injectable 1 milliGRAM(s) IntraMuscular once  insulin lispro (ADMELOG) corrective regimen sliding scale   SubCutaneous Before meals and at bedtime  metoprolol tartrate 25 milliGRAM(s) Oral two times a day  NIFEdipine XL 60 milliGRAM(s) Oral daily  piperacillin/tazobactam IVPB.. 3.375 Gram(s) IV Intermittent every 8 hours        RADIOLOGY & ADDITIONAL TESTS:    10/5/21 CT Chest No Cont (10.05.21 @ 14:07) Pulmonary edema with bilateral moderate to large pleural effusions. Underlying bilateral lower lobe compressive atelectasis versus pneumonia.  Mildly enlarged mediastinal lymph nodes, possibly reactive.      10/2/21: Xray Chest 1 View- PORTABLE-Routine (Xray Chest 1 View- PORTABLE-Routine in AM.) (10.02.21 @ 09:46) There is persistent bilateral perihilar/basilar diffuse airspace disease and/or RIGHT effusion.  Cardiomegaly.  No interval change.    Collected Date/Time: 9/22/2021 08:42 EDT   Received Date/Time: 9/23/2021 09:29 EDT   Surgical Pathology Report - Auth (Verified)   Specimen(s) Submitted   1 Right 5th Toe Wound Debridement r/o Osteomyelitis   Final Diagnosis   Right fifth toe; wound debridement:   - Bone with acute osteomyelitis and necrotic periosteal tissue.     9/28/21: US Abdomen Upper Quadrant Right (09.28.21 @ 12:13) Cholelithiasis without evidence of acute cholecystitis. Note is made of right pleural effusion    9/27/21: CT Abdomen and Pelvis w/ Oral Cont (09.27.21 @ 15:53) >No acute intra-abdominal pathology.    Small bilateral pleural effusions with compressive atelectasis of both lower lobes.    9/26/21: Xray Chest 1 View-PORTABLE IMMEDIATE (Xray Chest 1 View-PORTABLE IMMEDIATE .) (09.26.21 @ 15:28) : Small bilateral pleural effusions and mild pulmonary edema. A right lower lobe pneumonia is not excluded.      9/26/21: Xray Abdomen 1 View Portable, IMMEDIATE (Xray Abdomen 1 View Portable, IMMEDIATE .) (09.26.21 @ 15:28) : There is a nasogastric tube with its tip in the stomach. There is gaseous distention of the colon. No pathologic calcifications are seen. The osseous structures are intact with degenerative change is present in the spine.      9/17/21 : MR Foot No Cont, Right (09.17.21 @ 13:04) : Resection at the mid aspect of the fifth metatarsal. Lateral soft tissue wound with marrow signal abnormality throughout the fifth metatarsal and within the adjacent fourth metatarsal head which is nonspecific in the setting of recent surgery although osteomyelitis is suspected.    9/16/21 : Xray Foot AP + Lateral + Oblique, Right (09.16.21 @ 15:03) : No acute finding. No plain film evidence of osteomyelitis.        MICROBIOLOGY DATA:    COVID-19 PCR (10.11.21 @ 05:44)   COVID-19 PCR: NotDetec:   Urine Microscopic-Add On (NC) (09.22.21 @ 16:14)   Red Blood Cell - Urine: 0-2 /HPF   White Blood Cell - Urine: 3-5 /HPF   Bacteria: Moderate /HPF   Comment - Urine: yeast cells present   Epithelial Cells: Moderate /HPF     Culture - Tissue with Gram Stain (09.22.21 @ 13:54)   Gram Stain: No polymorphonuclear cells seen per low power field   No organisms seen per oil power field   Specimen Source: .Tissue Right 5th toe r/o osteomyeltis   Culture Results: No growth     COVID-19 David Domain Antibody (09.18.21 @ 12:55)   COVID-19 David Domain Antibody Result: >250.00:    Culture - Blood (09.17.21 @ 10:28)   Specimen Source: .Blood Blood-Peripheral   Culture Results: No growth to date.     Culture - Blood (09.17.21 @ 10:28)   Specimen Source: .Blood Blood-Venous   Culture Results: No growth to date.     Culture - Surgical Swab (09.16.21 @ 21:42)   - Amikacin: S <=16   - Amikacin: S <=16   - Amoxicillin/Clavulanic Acid: S <=8/4   - Ampicillin: R >16 These ampicillin results predict results for amoxicillin   - Ampicillin: S <=2 Predicts results to ampicillin/sulbactam, amoxacillin-clavulanate and piperacillin-tazobactam.   - Ampicillin/Sulbactam: S 8/4 Enterobacter, Citrobacter, and Serratia may develop resistance during prolonged therapy (3-4 days)   - Ampicillin/Sulbactam: R >16/8   - Aztreonam: S <=4   - Aztreonam: S <=4   - Cefazolin: R 16 Enterobacter, Citrobacter, and Serratia may develop resistance during prolonged therapy (3-4 days)   - Cefazolin: R >16   - Cefepime: S <=2   - Cefepime: S <=2   - Cefoxitin: S <=8   - Cefoxitin: S <=8   - Ceftazidime: S <=1   - Ceftriaxone: S <=1   - Ceftriaxone: S <=1 Enterobacter, Citrobacter, and Serratia may develop resistance during prolonged therapy   - Ciprofloxacin: S <=0.25   - Ciprofloxacin: S <=0.25   - Ertapenem: S <=0.5   - Gentamicin: S <=2   - Gentamicin: S <=2   - Imipenem: S <=1   - Levofloxacin: S <=0.5   - Levofloxacin: S <=0.5   - Meropenem: S <=1   - Meropenem: S <=1   - Piperacillin/Tazobactam: S <=8   - Piperacillin/Tazobactam: S <=8   - Tetra/Doxy: S 4   - Tobramycin: S <=2   - Trimethoprim/Sulfamethoxazole: S <=0.5/9.5   - Trimethoprim/Sulfamethoxazole: S <=0.5/9.5   - Vancomycin: S 2   Specimen Source: .Surgical Swab right foot wound   Culture Results:   Culture yields >4 types of aerobic and/or anaerobic bacteria   Call client services within 7 days if further workup is clinically   indicated. Culture includes   Rare Aeromonas hydrophila/caviae   Few Klebsiella oxytoca/Raoutella ornithinolytica   Few Enterococcus faecalis   Few Streptococcus agalactiae (Group B) isolated   Group B streptococci are susceptible to ampicillin,   penicillin and cefazolin, but may be resistant to   erythromycin and clindamycin.   Recommendations for intrapartum prophylaxis for Group B   streptococci are penicillin or ampicillin.   Organism Identification: Aeromonas hydrophila/caviae   Klebsiella oxytoca /Raoutella ornithinolytica   Enterococcus faecalis   Organism: Aeromonas hydrophila/caviae   Organism: Klebsiella oxytoca /Raoutella ornithinolytica   Organism: Enterococcus faecalis   COVID-19 PCR . (09.16.21 @ 22:24)   COVID-19 PCR: NotDetec:       Patient is seen and examined at the bed side, is afebrile. The events noted regarding patient refusing PT.      REVIEW OF SYSTEMS: All other review systems are negative      ALLERGIES: No Known Allergies      Vital Signs Last 24 Hrs  T(C): 36.4 (29 Oct 2021 05:42), Max: 36.4 (28 Oct 2021 21:29)  T(F): 97.5 (29 Oct 2021 05:42), Max: 97.6 (28 Oct 2021 21:29)  HR: 82 (29 Oct 2021 05:42) (77 - 82)  BP: 115/66 (29 Oct 2021 05:42) (115/66 - 136/69)  BP(mean): --  RR: 18 (29 Oct 2021 05:42) (18 - 18)  SpO2: 100% (29 Oct 2021 05:42) (100% - 100%)      PHYSICAL EXAM:  GENERAL: Not in distress  CHEST/LUNG:  Not using accessory muscles, s/p removal of Right CT   HEART: s1 and s2 present  ABDOMEN:  Mild distended   EXTREMITIES: Right foot bandage in placed   CNS: Awake and alert       LABS:                           9.9    5.03  )-----------( 302      ( 29 Oct 2021 06:09 )             30.4                           9.1    4.88  )-----------( 273      ( 27 Oct 2021 06:23 )             28.0       10-29    140  |  106  |  10  ----------------------------<  117<H>  3.8   |  25  |  1.19    Ca    9.2      29 Oct 2021 06:09      10-27    140  |  109<H>  |  8   ----------------------------<  117<H>  4.2   |  24  |  1.25    Ca    8.9      27 Oct 2021 06:23    TPro  7.1  /  Alb  2.6<L>  /  TBili  0.7  /  DBili  x   /  AST  27  /  ALT  34  /  AlkPhos  125<H>  10-25        09-25    139  |  107  |  41<H>  ----------------------------<  134<H>  4.1   |  21<L>  |  4.05<H>    Ca    8.6      25 Sep 2021 06:40    TPro  6.6  /  Alb  2.3<L>  /  TBili  0.5  /  DBili  x   /  AST  25  /  ALT  15  /  AlkPhos  102  09-25        CAPILLARY BLOOD GLUCOSE  POCT Blood Glucose.: 134 mg/dL (17 Sep 2021 17:11)  POCT Blood Glucose.: 128 mg/dL (17 Sep 2021 11:06)  POCT Blood Glucose.: 157 mg/dL (17 Sep 2021 04:10)      Vancomycin Level, Trough (10.14.21 @ 11:32) : 21.8  Vancomycin Level, Trough (10.12.21 @ 12:13) : 19.5:      MEDICATIONS  (STANDING):    apixaban 5 milliGRAM(s) Oral every 12 hours  aspirin  chewable 81 milliGRAM(s) Oral daily  atorvastatin 80 milliGRAM(s) Oral at bedtime  chlorhexidine 2% Cloths 1 Application(s) Topical <User Schedule>  collagenase Ointment 1 Application(s) Topical daily  cyanocobalamin 1000 MICROGram(s) Oral daily  dextrose 5%. 1000 milliLiter(s) (100 mL/Hr) IV Continuous <Continuous>  ergocalciferol 86973 Unit(s) Oral <User Schedule>  ferrous    sulfate Liquid 300 milliGRAM(s) Enteral Tube daily  finasteride 5 milliGRAM(s) Oral daily  gabapentin 100 milliGRAM(s) Oral three times a day  glucagon  Injectable 1 milliGRAM(s) IntraMuscular once  insulin lispro (ADMELOG) corrective regimen sliding scale   SubCutaneous Before meals and at bedtime  metoprolol tartrate 25 milliGRAM(s) Oral two times a day  NIFEdipine XL 60 milliGRAM(s) Oral daily  piperacillin/tazobactam IVPB.. 3.375 Gram(s) IV Intermittent every 8 hours        RADIOLOGY & ADDITIONAL TESTS:    10/5/21 CT Chest No Cont (10.05.21 @ 14:07) Pulmonary edema with bilateral moderate to large pleural effusions. Underlying bilateral lower lobe compressive atelectasis versus pneumonia.  Mildly enlarged mediastinal lymph nodes, possibly reactive.      10/2/21: Xray Chest 1 View- PORTABLE-Routine (Xray Chest 1 View- PORTABLE-Routine in AM.) (10.02.21 @ 09:46) There is persistent bilateral perihilar/basilar diffuse airspace disease and/or RIGHT effusion.  Cardiomegaly.  No interval change.    Collected Date/Time: 9/22/2021 08:42 EDT   Received Date/Time: 9/23/2021 09:29 EDT   Surgical Pathology Report - Auth (Verified)   Specimen(s) Submitted   1 Right 5th Toe Wound Debridement r/o Osteomyelitis   Final Diagnosis   Right fifth toe; wound debridement:   - Bone with acute osteomyelitis and necrotic periosteal tissue.     9/28/21: US Abdomen Upper Quadrant Right (09.28.21 @ 12:13) Cholelithiasis without evidence of acute cholecystitis. Note is made of right pleural effusion    9/27/21: CT Abdomen and Pelvis w/ Oral Cont (09.27.21 @ 15:53) >No acute intra-abdominal pathology.    Small bilateral pleural effusions with compressive atelectasis of both lower lobes.    9/26/21: Xray Chest 1 View-PORTABLE IMMEDIATE (Xray Chest 1 View-PORTABLE IMMEDIATE .) (09.26.21 @ 15:28) : Small bilateral pleural effusions and mild pulmonary edema. A right lower lobe pneumonia is not excluded.      9/26/21: Xray Abdomen 1 View Portable, IMMEDIATE (Xray Abdomen 1 View Portable, IMMEDIATE .) (09.26.21 @ 15:28) : There is a nasogastric tube with its tip in the stomach. There is gaseous distention of the colon. No pathologic calcifications are seen. The osseous structures are intact with degenerative change is present in the spine.      9/17/21 : MR Foot No Cont, Right (09.17.21 @ 13:04) : Resection at the mid aspect of the fifth metatarsal. Lateral soft tissue wound with marrow signal abnormality throughout the fifth metatarsal and within the adjacent fourth metatarsal head which is nonspecific in the setting of recent surgery although osteomyelitis is suspected.    9/16/21 : Xray Foot AP + Lateral + Oblique, Right (09.16.21 @ 15:03) : No acute finding. No plain film evidence of osteomyelitis.        MICROBIOLOGY DATA:    COVID-19 PCR (10.11.21 @ 05:44)   COVID-19 PCR: NotDetec:   Urine Microscopic-Add On (NC) (09.22.21 @ 16:14)   Red Blood Cell - Urine: 0-2 /HPF   White Blood Cell - Urine: 3-5 /HPF   Bacteria: Moderate /HPF   Comment - Urine: yeast cells present   Epithelial Cells: Moderate /HPF     Culture - Tissue with Gram Stain (09.22.21 @ 13:54)   Gram Stain: No polymorphonuclear cells seen per low power field   No organisms seen per oil power field   Specimen Source: .Tissue Right 5th toe r/o osteomyeltis   Culture Results: No growth     COVID-19 David Domain Antibody (09.18.21 @ 12:55)   COVID-19 David Domain Antibody Result: >250.00:    Culture - Blood (09.17.21 @ 10:28)   Specimen Source: .Blood Blood-Peripheral   Culture Results: No growth to date.     Culture - Blood (09.17.21 @ 10:28)   Specimen Source: .Blood Blood-Venous   Culture Results: No growth to date.     Culture - Surgical Swab (09.16.21 @ 21:42)   - Amikacin: S <=16   - Amikacin: S <=16   - Amoxicillin/Clavulanic Acid: S <=8/4   - Ampicillin: R >16 These ampicillin results predict results for amoxicillin   - Ampicillin: S <=2 Predicts results to ampicillin/sulbactam, amoxacillin-clavulanate and piperacillin-tazobactam.   - Ampicillin/Sulbactam: S 8/4 Enterobacter, Citrobacter, and Serratia may develop resistance during prolonged therapy (3-4 days)   - Ampicillin/Sulbactam: R >16/8   - Aztreonam: S <=4   - Aztreonam: S <=4   - Cefazolin: R 16 Enterobacter, Citrobacter, and Serratia may develop resistance during prolonged therapy (3-4 days)   - Cefazolin: R >16   - Cefepime: S <=2   - Cefepime: S <=2   - Cefoxitin: S <=8   - Cefoxitin: S <=8   - Ceftazidime: S <=1   - Ceftriaxone: S <=1   - Ceftriaxone: S <=1 Enterobacter, Citrobacter, and Serratia may develop resistance during prolonged therapy   - Ciprofloxacin: S <=0.25   - Ciprofloxacin: S <=0.25   - Ertapenem: S <=0.5   - Gentamicin: S <=2   - Gentamicin: S <=2   - Imipenem: S <=1   - Levofloxacin: S <=0.5   - Levofloxacin: S <=0.5   - Meropenem: S <=1   - Meropenem: S <=1   - Piperacillin/Tazobactam: S <=8   - Piperacillin/Tazobactam: S <=8   - Tetra/Doxy: S 4   - Tobramycin: S <=2   - Trimethoprim/Sulfamethoxazole: S <=0.5/9.5   - Trimethoprim/Sulfamethoxazole: S <=0.5/9.5   - Vancomycin: S 2   Specimen Source: .Surgical Swab right foot wound   Culture Results:   Culture yields >4 types of aerobic and/or anaerobic bacteria   Call client services within 7 days if further workup is clinically   indicated. Culture includes   Rare Aeromonas hydrophila/caviae   Few Klebsiella oxytoca/Raoutella ornithinolytica   Few Enterococcus faecalis   Few Streptococcus agalactiae (Group B) isolated   Group B streptococci are susceptible to ampicillin,   penicillin and cefazolin, but may be resistant to   erythromycin and clindamycin.   Recommendations for intrapartum prophylaxis for Group B   streptococci are penicillin or ampicillin.   Organism Identification: Aeromonas hydrophila/caviae   Klebsiella oxytoca /Raoutella ornithinolytica   Enterococcus faecalis   Organism: Aeromonas hydrophila/caviae   Organism: Klebsiella oxytoca /Raoutella ornithinolytica   Organism: Enterococcus faecalis   COVID-19 PCR . (09.16.21 @ 22:24)   COVID-19 PCR: NotDetec:

## 2021-10-31 LAB
ANION GAP SERPL CALC-SCNC: 8 MMOL/L — SIGNIFICANT CHANGE UP (ref 5–17)
BUN SERPL-MCNC: 9 MG/DL — SIGNIFICANT CHANGE UP (ref 7–18)
CALCIUM SERPL-MCNC: 9.3 MG/DL — SIGNIFICANT CHANGE UP (ref 8.4–10.5)
CHLORIDE SERPL-SCNC: 109 MMOL/L — HIGH (ref 96–108)
CO2 SERPL-SCNC: 24 MMOL/L — SIGNIFICANT CHANGE UP (ref 22–31)
CREAT SERPL-MCNC: 1.26 MG/DL — SIGNIFICANT CHANGE UP (ref 0.5–1.3)
GLUCOSE BLDC GLUCOMTR-MCNC: 103 MG/DL — HIGH (ref 70–99)
GLUCOSE BLDC GLUCOMTR-MCNC: 112 MG/DL — HIGH (ref 70–99)
GLUCOSE BLDC GLUCOMTR-MCNC: 123 MG/DL — HIGH (ref 70–99)
GLUCOSE BLDC GLUCOMTR-MCNC: 124 MG/DL — HIGH (ref 70–99)
GLUCOSE SERPL-MCNC: 109 MG/DL — HIGH (ref 70–99)
HCT VFR BLD CALC: 31.1 % — LOW (ref 39–50)
HGB BLD-MCNC: 10 G/DL — LOW (ref 13–17)
MCHC RBC-ENTMCNC: 29 PG — SIGNIFICANT CHANGE UP (ref 27–34)
MCHC RBC-ENTMCNC: 32.2 GM/DL — SIGNIFICANT CHANGE UP (ref 32–36)
MCV RBC AUTO: 90.1 FL — SIGNIFICANT CHANGE UP (ref 80–100)
NRBC # BLD: 0 /100 WBCS — SIGNIFICANT CHANGE UP (ref 0–0)
PLATELET # BLD AUTO: 276 K/UL — SIGNIFICANT CHANGE UP (ref 150–400)
POTASSIUM SERPL-MCNC: 3.7 MMOL/L — SIGNIFICANT CHANGE UP (ref 3.5–5.3)
POTASSIUM SERPL-SCNC: 3.7 MMOL/L — SIGNIFICANT CHANGE UP (ref 3.5–5.3)
RBC # BLD: 3.45 M/UL — LOW (ref 4.2–5.8)
RBC # FLD: 14.6 % — HIGH (ref 10.3–14.5)
SODIUM SERPL-SCNC: 141 MMOL/L — SIGNIFICANT CHANGE UP (ref 135–145)
WBC # BLD: 4.88 K/UL — SIGNIFICANT CHANGE UP (ref 3.8–10.5)
WBC # FLD AUTO: 4.88 K/UL — SIGNIFICANT CHANGE UP (ref 3.8–10.5)

## 2021-10-31 RX ADMIN — Medication 1 APPLICATION(S): at 11:57

## 2021-10-31 RX ADMIN — CHLORHEXIDINE GLUCONATE 1 APPLICATION(S): 213 SOLUTION TOPICAL at 06:09

## 2021-10-31 RX ADMIN — GABAPENTIN 100 MILLIGRAM(S): 400 CAPSULE ORAL at 13:21

## 2021-10-31 RX ADMIN — Medication 300 MILLIGRAM(S): at 11:57

## 2021-10-31 RX ADMIN — PIPERACILLIN AND TAZOBACTAM 25 GRAM(S): 4; .5 INJECTION, POWDER, LYOPHILIZED, FOR SOLUTION INTRAVENOUS at 13:21

## 2021-10-31 RX ADMIN — APIXABAN 5 MILLIGRAM(S): 2.5 TABLET, FILM COATED ORAL at 17:34

## 2021-10-31 RX ADMIN — Medication 25 MILLIGRAM(S): at 05:10

## 2021-10-31 RX ADMIN — GABAPENTIN 100 MILLIGRAM(S): 400 CAPSULE ORAL at 22:32

## 2021-10-31 RX ADMIN — Medication 81 MILLIGRAM(S): at 11:57

## 2021-10-31 RX ADMIN — FINASTERIDE 5 MILLIGRAM(S): 5 TABLET, FILM COATED ORAL at 11:57

## 2021-10-31 RX ADMIN — GABAPENTIN 100 MILLIGRAM(S): 400 CAPSULE ORAL at 05:10

## 2021-10-31 RX ADMIN — PANTOPRAZOLE SODIUM 40 MILLIGRAM(S): 20 TABLET, DELAYED RELEASE ORAL at 06:09

## 2021-10-31 RX ADMIN — PIPERACILLIN AND TAZOBACTAM 25 GRAM(S): 4; .5 INJECTION, POWDER, LYOPHILIZED, FOR SOLUTION INTRAVENOUS at 22:32

## 2021-10-31 RX ADMIN — APIXABAN 5 MILLIGRAM(S): 2.5 TABLET, FILM COATED ORAL at 05:10

## 2021-10-31 RX ADMIN — Medication 60 MILLIGRAM(S): at 05:10

## 2021-10-31 RX ADMIN — ERGOCALCIFEROL 50000 UNIT(S): 1.25 CAPSULE ORAL at 11:57

## 2021-10-31 RX ADMIN — PIPERACILLIN AND TAZOBACTAM 25 GRAM(S): 4; .5 INJECTION, POWDER, LYOPHILIZED, FOR SOLUTION INTRAVENOUS at 05:09

## 2021-10-31 RX ADMIN — ATORVASTATIN CALCIUM 80 MILLIGRAM(S): 80 TABLET, FILM COATED ORAL at 22:32

## 2021-10-31 RX ADMIN — PREGABALIN 1000 MICROGRAM(S): 225 CAPSULE ORAL at 11:57

## 2021-10-31 NOTE — PROGRESS NOTE ADULT - SUBJECTIVE AND OBJECTIVE BOX
Patient is a 76y old  Male who presents with a chief complaint of R Foot Pain (30 Oct 2021 16:14)    PATIENT IS SEEN AND EXAMINED IN MEDICAL FLOOR.    NGT [    ]    MADDY [   ]      GT [   ]    ALLERGIES:  No Known Allergies      Daily     Daily     VITALS:    Vital Signs Last 24 Hrs  T(C): 36.6 (31 Oct 2021 05:06), Max: 37 (30 Oct 2021 13:08)  T(F): 97.8 (31 Oct 2021 05:06), Max: 98.6 (30 Oct 2021 13:08)  HR: 79 (31 Oct 2021 05:06) (66 - 79)  BP: 143/65 (31 Oct 2021 05:06) (104/58 - 143/65)  BP(mean): --  RR: 16 (31 Oct 2021 05:06) (16 - 18)  SpO2: 100% (31 Oct 2021 05:06) (100% - 100%)    LABS:    CBC Full  -  ( 31 Oct 2021 06:26 )  WBC Count : 4.88 K/uL  RBC Count : 3.45 M/uL  Hemoglobin : 10.0 g/dL  Hematocrit : 31.1 %  Platelet Count - Automated : 276 K/uL  Mean Cell Volume : 90.1 fl  Mean Cell Hemoglobin : 29.0 pg  Mean Cell Hemoglobin Concentration : 32.2 gm/dL  Auto Neutrophil # : x  Auto Lymphocyte # : x  Auto Monocyte # : x  Auto Eosinophil # : x  Auto Basophil # : x  Auto Neutrophil % : x  Auto Lymphocyte % : x  Auto Monocyte % : x  Auto Eosinophil % : x  Auto Basophil % : x      10-31    141  |  109<H>  |  9   ----------------------------<  109<H>  3.7   |  24  |  1.26    Ca    9.3      31 Oct 2021 06:26      CAPILLARY BLOOD GLUCOSE      POCT Blood Glucose.: 124 mg/dL (31 Oct 2021 11:41)  POCT Blood Glucose.: 103 mg/dL (31 Oct 2021 07:51)  POCT Blood Glucose.: 126 mg/dL (30 Oct 2021 21:04)  POCT Blood Glucose.: 142 mg/dL (30 Oct 2021 16:01)          Creatinine Trend: 1.26<--, 1.19<--, 1.25<--, 1.17<--, 1.22<--, 1.07<--  I&O's Summary    30 Oct 2021 07:01  -  31 Oct 2021 07:00  --------------------------------------------------------  IN: 0 mL / OUT: 2100 mL / NET: -2100 mL            .Body Fluid Pleural Fluid  10-08 @ 18:51   No growth at 1 week.  --  --      .Body Fluid Pleural Fluid  10-08 @ 18:34   No growth at 5 days  --    polymorphonuclear leukocytes seen  No organisms seen  by cytocentrifuge      .Tissue Right 5th toe r/o osteomyeltis  09-22 @ 13:54   No growth at 5 days  --    No polymorphonuclear cells seen per low power field  No organisms seen per oil power field      .Blood Blood-Venous  09-17 @ 10:28   No Growth Final  --  --      .Surgical Swab right foot wound  09-16 @ 21:42   Culture yields >4 types of aerobic and/or anaerobic bacteria  Call client services within 7 days if further workup is clinically  indicated. Culture includes  Rare Aeromonas hydrophila/caviae  Few Klebsiella oxytoca/Raoutella ornithinolytica  Few Enterococcus faecalis  Few Streptococcus agalactiae (Group B) isolated  Group B streptococci are susceptible to ampicillin,  penicillin and cefazolin, but may be resistant to  erythromycin and clindamycin.  Recommendations for intrapartum prophylaxis for Group B  streptococci are penicillin or ampicillin.  --  Aeromonas hydrophila/caviae  Klebsiella oxytoca /Raoutella ornithinolytica  Enterococcus faecalis      Bone R 5th metatarsal bone clean ma  08-20 @ 03:59   No growth at 5 days  --    No polymorphonuclear leukocytes seen per low power field  No organisms seen per oil power field      .Blood Blood-Venous  08-14 @ 21:09   No Growth Final  --  --      .Surgical Swab Right foot plantar wound  08-14 @ 21:08   Moderate Streptococcus agalactiae (Group B) isolated  Group B streptococci are susceptible to ampicillin,  penicillin and cefazolin, but may be resistant to  erythromycin and clindamycin.  Recommendations for intrapartum prophylaxis for Group B  streptococci are penicillin or ampicillin.  Moderate Bacteroides fragilis "Susceptibilities not performed"  Normal skin filiberto isolated  --  --          MEDICATIONS:    MEDICATIONS  (STANDING):  apixaban 5 milliGRAM(s) Oral every 12 hours  aspirin  chewable 81 milliGRAM(s) Oral daily  atorvastatin 80 milliGRAM(s) Oral at bedtime  chlorhexidine 2% Cloths 1 Application(s) Topical <User Schedule>  collagenase Ointment 1 Application(s) Topical daily  cyanocobalamin 1000 MICROGram(s) Oral daily  dextrose 5% + sodium chloride 0.45%. 1000 milliLiter(s) (75 mL/Hr) IV Continuous <Continuous>  dextrose 5%. 1000 milliLiter(s) (100 mL/Hr) IV Continuous <Continuous>  ergocalciferol 42558 Unit(s) Oral <User Schedule>  ferrous    sulfate Liquid 300 milliGRAM(s) Enteral Tube daily  finasteride 5 milliGRAM(s) Oral daily  gabapentin 100 milliGRAM(s) Oral three times a day  glucagon  Injectable 1 milliGRAM(s) IntraMuscular once  insulin lispro (ADMELOG) corrective regimen sliding scale   SubCutaneous Before meals and at bedtime  metoprolol tartrate 25 milliGRAM(s) Oral two times a day  NIFEdipine XL 60 milliGRAM(s) Oral daily  pantoprazole    Tablet 40 milliGRAM(s) Oral before breakfast  piperacillin/tazobactam IVPB.. 3.375 Gram(s) IV Intermittent every 8 hours      MEDICATIONS  (PRN):  acetaminophen   Tablet .. 650 milliGRAM(s) Oral every 6 hours PRN Temp greater or equal to 38C (100.4F), Moderate Pain (4 - 6)  ondansetron Injectable 4 milliGRAM(s) IV Push three times a day PRN Nausea and/or Vomiting  sodium chloride 0.9% lock flush 10 milliLiter(s) IV Push every 1 hour PRN Pre/post blood products, medications, blood draw, and to maintain line patency        REVIEW OF SYSTEMS:                           ALL ROS DONE [ X   ]      CONSTITUTIONAL:  LETHARGIC [   ], FEVER [   ], UNRESPONSIVE [   ]  CVS:  CP  [   ], SOB, [   ], PALPITATIONS [   ], DIZZYNESS [   ]  RS: COUGH [   ], SPUTUM [   ]  GI: ABDOMINAL PAIN [   ], NAUSEA [   ], VOMITINGS [   ], DIARRHEA [   ], CONSTIPATION [   ]  :  DYSURIA [   ], NOCTURIA [   ], INCREASED FREQUENCY [   ], DRIBLING [   ],  SKELETAL: PAINFUL JOINTS [   ], SWOLLEN JOINTS [   ], NECK ACHE [   ], LOW BACK ACHE [   ],  SKIN : ULCERS [   ], RASH [   ], ITCHING [   ]  CNS: HEAD ACHE [   ], DOUBLE VISION [   ], BLURRED VISION [   ], AMS / CONFUSION [   ], SEIZURES [   ], WEAKNESS [   ],TINGLING / NUMBNESS [   ]        PHYSICAL EXAMINATION:    GENERAL APPEARANCE: NO DISTRESS  HEENT:  NO PALLOR, NO  JVD,  NO   NODES, NECK SUPPLE  CVS: S1 +, S2 +,   RS: AEEB,  OCCASIONAL  RALES +,  RONCHI +  ABD: SOFT, NT, NO, BS +       EXT: NO PE  SKIN: WARM,   RIGHT FOOT WRAPPED  SKELETAL:  ROM ACCEPTABLE  CNS:  AAO X  2-3        RADIOLOGY :    < from: Transthoracic Echocardiogram (10.07.21 @ 15:26) >  CONCLUSIONS:  1. Normal mitral valve. Moderate mitral regurgitation.  2. Calcified aortic valve with decreased opening, cannot  exclude bicuspid. Peak transaortic valve gradient equals  32.4 mm Hg, mean transaortic valve gradient equals 19 mm  Hg, dimensionless index 0.22, estimated aortic valve area  equals 0.6sqcm (by continuity equation), consistent with  paradoxical low-flow low-gradient severe aortic stenosis.  Consider dobutamine stress echocardiogram and/or SABIHA for  further evaluation.  No aortic valve regurgitation seen.  3. Normal aortic root.  4. Normal left atrium.  5. Moderate concentric left ventricular hypertrophy.  6. Moderate global left ventricular systolic dysfunction  (EF 40-45% by visual estimation).  7. Grade II diastolic dysfunction (moderate).  8. Normal right atrium.  9. Normal right ventricular size with decreased RV systolic  function (TAPSE 1.2 cm).  10. RV systolic pressure is borderline normal at  34 mm Hg.  11. Tricuspid valve not well seen. Trace tricuspid  regurgitation.  12. Normal pulmonic valve. Trace pulmonic insufficiency is  noted.  13. No pericardial effusion.  14. Bilateral pleural effusions.    < end of copied text >      EXAM:  CT ABDOMEN AND PELVIS OC                            PROCEDURE DATE:  09/27/2021          INTERPRETATION:  CLINICAL INFORMATION: Small bowel obstruction.    COMPARISON: None.    CONTRAST/COMPLICATIONS:  IV Contrast: NONE  Oral Contrast: Omnipaque 300  Complications: None reported at time of study completion    PROCEDURE:  CT of the Abdomen and Pelvis was performed.  Sagittal and coronal reformats were performed.    FINDINGS:  LOWER CHEST: Small bilateral pleural effusions with compressiveatelectasis of both lower lobes.    LIVER: Within normal limits.  BILE DUCTS: Normal caliber.  GALLBLADDER: Distended. Small layering gallstones.  SPLEEN: Within normal limits.  PANCREAS: Within normal limits.  ADRENALS: Within normal limits.  KIDNEYS/URETERS: No hydronephrosis. Nonobstructing left upper pole calculus, measuring 0.6 cm.    BLADDER: Urinary bladder contains air and a Castañeda catheter balloon.  REPRODUCTIVE ORGANS: Prostate is not enlarged.    BOWEL: No bowel obstruction. Appendix isnormal. Colonic diverticulosis.  PERITONEUM: Mild presacral fluid.  VESSELS: Atherosclerotic changes.  RETROPERITONEUM/LYMPH NODES: No lymphadenopathy.  ABDOMINAL WALL: Within normal limits.  BONES: Degenerative changes. Sternotomy.    IMPRESSION:  No acute intra-abdominal pathology.    Small bilateral pleural effusions with compressive atelectasis of both lower lobes.    ====================================================    EXAM:  MR FOOT RT                            PROCEDURE DATE:  09/17/2021          INTERPRETATION:  Clinical Information: Recent fifth metatarsal head resection now with fifth digit pain, swelling and foul discharge.    Comparison: Radiographs of the right foot from 9/16/2021 and MRI the right foot from 8/16/2021.    Technique:  MRI of the right midfoot and forefoot.  Intravenous Contrast: None.    Findings:    There is a resection at the mid aspect of the fifth metatarsal shaft. There is a lateral soft tissue wound beginning at the level of the amputation which extends distally. There is susceptibility artifact in the region of the amputation consistent with postoperative change. There is hyperintense T2 marrow signal throughout the remaining fifth metatarsal and within the adjacent fourth metatarsal head which is nonspecific and could be related to recent postoperative changes although osteomyelitis is suspected.    There is edema and mild atrophy within the plantar muscles of the foot. There is minimal spurring at the first metatarsophalangeal and hallux sesamoid articulations.    Impression:  Resection at the mid aspect of the fifth metatarsal. Lateral soft tissue wound with marrow signal abnormality throughout the fifth metatarsal and within the adjacent fourth metatarsal head which is nonspecific in the setting of recent surgery although osteomyelitis is suspected.        ASSESSMENT :     Headache    HTN (hypertension)    HLD (hyperlipidemia)    DM (diabetes mellitus)    CAD (coronary artery disease)    Glaucoma, angle-closure    [place] (hard of hearing)      S/P CABG (coronary artery bypass graft)    History of thyroid surgery        PLAN:    HPI:  76M from home, lives with daughter w/ PMH DM on insulin, HTN, HLD, CAD, 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 Pt with sharp pain on the R lateral foot. As per daughter, pt was taking tylenol which did not help his pain prompting the patient to ask his daughter to take him to the hospital. Pt is a poor historian. Daughter states that the patient did not see his podiatrist after the surgery. No fever, chest, pain, palpitations, nausea, vomiting, diarrhea (17 Sep 2021 01:11)      # REPEAT NASAL SWAB FOR COVID 19 IS NEGATIVE - WILL OBTAIN PT & OT EVALUATION AND DC PLAN HOME ON MONDAY WITH HOME CARE , IF REQUIRED    # CASE D/W PATIENT CARE TEAM CHERELLE DIAL TO HELP ASSIST WITH PATIENT AND FAMILY'S NEEDS AT THIS TIME     # PATIENT IS S/P ICU MONITORING AND RIGHT CHEST TUBE REMOVAL ON 10/15/2021     # COVID EXPOSURE ON 10/13 AND ONCE MORE HAD RE-EXPOSURE ON 10/22 - INFECTION CONTROL IS AWARE AND ADDRESSING - ON CONTACT AND DROPLET ISOLATION PRECAUTION; F/U COVID PCR ON 10/30 AND 11/1 - IF NEGATIVE D/C ISOLATION    # RIGHT FOOT OSTEOMYELITIS S/P RIGHT 5TH RAY RESECTION [ 8/19 ]  AND S/P DEBRIDEMENT, GRAFT APPLICATION, BONE BX AND WOUND VAC APPLICATION 9/22 - BONE BX W/ ACUTE OSTEOMYELITIS AND NECROTIC PERIOSTEAL TISSUE  ** RECENT ADMISSION FOR RIGHT DIABETIC FOOT ULCER, CELLULITIS, OSTEOMYELITIS RIGHT 5th METATARSAL S/P RIGHT 5TH PARTIAL RAY AMPUTATION [8/20] - BONE PATHOLOGY W/ CLEAN MARGINS    - S/P VANCOMYCIN AND ZOSYN ; NOW ON UNASYN AND LEVAQUIN GIVEN OREN; PER ID SWITCH TO ZOSYN UNTIL 11/3   - RECENTLY HAD SIMON W/ MILD PAD  - REVIEWED WOUND CX [ ENTEROCOCCUS, AEROMONAS, KLEBSIELLA] AND BCX  - ID CONSULT, PODIATRY CONSULT    - PICC LINE PLACED TO COMPLETE 6 WEEKS OF ANTIBIOTICS - ON ZOSYN UNTIL 11/3 ON D/C    # ACUTE HYPOXIC RESPIRATORY FAILURE DUE TO ACUTE ON CHRONIC SYSTOLIC CHF  (  LV EF 40- 45 % ) , DIASTOLIC LV DYSFUNCTION , ,  SEVERE AORTIC STENOSIS & MODERATE MITRAL REGURGITATION  &  ASPIRATION PNA;  - S/P CHEST TUBE INSERTION AND REMOVAL  , S/P LASIX ,   CARDIOLOGY CONSULT IN PROGRESS      - CHEST TUBE PLACED [10/8] - FLUID CONSISTENT WITH TRANSUDATIVE PROCESS  - S/P EXTUBATION 10/13  - S/P CHEST TUBE REMOVAL 10/15      # SEPTIC SHOCK - ? S/T HYPOVOLEMIA VS. SEPSIS - S/P CVC [SWITCHED FROM FEMORAL TO LEFT IJ], S/P VASOPRESSORS - RESOLVING  - BCX - NGTD  - ON MEROPENEM      # TRANSIENT NEW ONSET A.FIB, PAROXYSMAL - ON ELIQUIS, CARDIOLOGY CONSULT IN PROGRESS    # FUNGURIA - D/C FLUCONAZOLE GIVEN TRANSAMINITIS    # TRANSAMINITIS - SUSPECT THIS IS ISCHEMIC HEPATITIS - IMPROVING - HEPATOLOGY CONSULT IN PROGRESS    # BOWEL DISTENTION - ? ILEUS - OPTIMIZING ELECTROLYTES - IMPROVED  - SURGERY CONSULT IN PROGRESS    # CHOLELITHIASIS - TRENDING LFTS, RUQ U/S - CHOLELITHIASIS, S/P SURGERY CONSULT   - S/P GI CONSULT - LESS SUSPICIOUS FOR CHOLECYSTITIS    # DYSPEPSIA - PLACED ON PPI BID WITH IMPROVEMENT, UNDERWENT ST EVAL     # ELEVATED TROPONINS - NSTEMI - ? DEMAND - WILL NEED ISCHEMIC EVAL ONCE ACUTE INFECTION RESOLVES PER CARDIOLOGY, CARDIOLOGY CONSULT IN PROGRESS  - ON ASA, STATIN, BB    # OREN ON CKD - S/T ATN AND URINARY RETENTION - IMPROVING - S/P IVF, NOW W/ CASTAÑEDA, NEPHROLOGY CONUSLT IN PROGRESS  -  BPH ON FLOMAX, AND FINASTERIDE  - FAILED TOV WITH WORSENING RENAL FXN - NOTED URINARY RETENTION [10/4] - REPLACED CASTAÑEDA  - TOV 10/18 - BLADDER SCAN BID TO EVALUATE FOR URINARY RETENTION - FAILED TOV  - OVERNIGHT 10/18 - CASTAÑEDA REPLACED    # MEDICAL CLEARANCE FOR SURGERY - RCRI - 2 - 10.1 % 30 DAY RISK OF DEATH, MI OR CARDIAC ARREST  - CARDIOLOGY CONSULT     # DM -  HBA1C - 11  - LANTUS, SSI + FS    # CAD S/P CABG - PLACED ON ASA, STATIN, BB  - ECHO - SEVERE AORTIC STENOSIS, SEVERE CONCENTRIC LVH, G1DD, MILD WA  - CARDIOLOGY CONSULT - DR. BASSETT   - MAY NEED ISCHEMIC EVAL ONCE ACUTE ILLNESS RESOLVES INCLUDING CARDIAC CATHETERIZATION    # HTN, HLD  - ON METOPROLOL, NIFEDIPINE, STATIN     # HARD OF HEARING       #  IMPAIRED GAIT DUE TO CERVICAL & LS SPONDYLOSIS, POLY ARTHRITIS AND DIABETIC PERIPHERAL NEUROPATHY, OP VITAMIN D DEFICIENCY - ON CHOLECALCIFEROL, OBTAIN PT & OT   #  FAILURE TO THRIVE , MODERATE PROTEIN CALORIE MALNUTRITION       # CASE DISCUSSED AT LENGTH WITH PATIENT AND DAUGHTER, BIRDIE ROBERT VIA PHONE @ [phone] [10/25] - CASE DICUSSED AT LENGTH AND ALL QUESTIONS WERE ANSWERED. DAUGHTER UNDERSTOOD THAT PROGNOSIS IS GUARDED.  # PATIENT AND DAUGHTER REFUSED [facility] ON PREVIOUS ADMISSION, PREVIOUSLY WISHED FOR PATIENT RETURN HOME W/ HOME PT; HOWEVER NOW, DAUGHTER WISHES FOR PATIENT TO GO TO [facility], AS PATIENT'S WIFE IS CURRENTLY ADMITTED THERE    # GI AND DVT PPX    DR. JAELYN MUNSON

## 2021-10-31 NOTE — PROGRESS NOTE ADULT - SUBJECTIVE AND OBJECTIVE BOX
C A R D I O L O G Y  **********************************    DATE OF SERVICE: 10-31-21  No angina, palpitations or orthopnea.    acetaminophen   Tablet .. 650 milliGRAM(s) Oral every 6 hours PRN  apixaban 5 milliGRAM(s) Oral every 12 hours  aspirin  chewable 81 milliGRAM(s) Oral daily  atorvastatin 80 milliGRAM(s) Oral at bedtime  chlorhexidine 2% Cloths 1 Application(s) Topical <User Schedule>  collagenase Ointment 1 Application(s) Topical daily  cyanocobalamin 1000 MICROGram(s) Oral daily  dextrose 5% + sodium chloride 0.45%. 1000 milliLiter(s) IV Continuous <Continuous>  dextrose 5%. 1000 milliLiter(s) IV Continuous <Continuous>  ergocalciferol 28470 Unit(s) Oral <User Schedule>  ferrous    sulfate Liquid 300 milliGRAM(s) Enteral Tube daily  finasteride 5 milliGRAM(s) Oral daily  gabapentin 100 milliGRAM(s) Oral three times a day  glucagon  Injectable 1 milliGRAM(s) IntraMuscular once  insulin lispro (ADMELOG) corrective regimen sliding scale   SubCutaneous Before meals and at bedtime  metoprolol tartrate 25 milliGRAM(s) Oral two times a day  NIFEdipine XL 60 milliGRAM(s) Oral daily  ondansetron Injectable 4 milliGRAM(s) IV Push three times a day PRN  pantoprazole    Tablet 40 milliGRAM(s) Oral before breakfast  piperacillin/tazobactam IVPB.. 3.375 Gram(s) IV Intermittent every 8 hours  sodium chloride 0.9% lock flush 10 milliLiter(s) IV Push every 1 hour PRN                            10.0   4.88  )-----------( 276      ( 31 Oct 2021 06:26 )             31.1       10-31    141  |  109<H>  |  9   ----------------------------<  109<H>  3.7   |  24  |  1.26    Ca    9.3      31 Oct 2021 06:26    T(C): 36.4 (10-31-21 @ 13:16), Max: 36.6 (10-31-21 @ 05:06)  HR: 64 (10-31-21 @ 13:16) (64 - 79)  BP: 106/42 (10-31-21 @ 13:16) (104/58 - 143/65)  RR: 16 (10-31-21 @ 13:16) (16 - 18)  SpO2: 100% (10-31-21 @ 13:16) (100% - 100%)  Wt(kg): --    I&O's Summary    30 Oct 2021 07:01  -  31 Oct 2021 07:00  --------------------------------------------------------  IN: 0 mL / OUT: 2100 mL / NET: -2100 mL        Gen: thin  male in no acute distress, oriented x 3.  HEENT:  (-)icterus (-)pallor  CV: N S1 S2 1/6 TRINIDAD (+)2 Pulses B/l  Resp:  Coarse sounds B/L, normal effort  GI: (+) BS Soft, NT, ND  Lymph:  (-)Edema, (-)obvious lymphadenopathy  Skin: Warm to touch, Normal turgor  Psych:  appropriate mood and affect        ASSESSMENT/PLAN: 	76y  Male PMH DM on insulin, HTN, HLD, normal lV fx moderate to severe AS 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    - crt improved  -  complete course of Abx per ID   - Echo noted, Severe AS, EF 40-45%   - He will need a SABIHA and R+L heart cath when he recovers and has no active infection  - Cont medical management of CAD  - Pt. with new onset PAF now on Eliquis  - Pig tail removed on 10/16  - No need for further inpatient cardiac work up.  Patient may wish to defer invasive workup to outpatient setting.  Has been hospitalized x 6 weeks already.  Continue discussion re: timing of aortic valve workup.    Rob Wu M.D.  Cardiac Electrophysiology  [phone]

## 2021-10-31 NOTE — PROGRESS NOTE ADULT - ASSESSMENT
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  As per daughter, patient was taking tylenol which did not help his pain prompting the patient to ask his daughter to take him to the hospital. ON admission, he has no fever or Leukocytosis. The Xray of Right foot shows no Osteomyelitis but MRI of Right foot shows Lateral soft tissue wound with marrow signal abnormality throughout the fifth metatarsal which is consistent of Osteomyelitis. He has seen by Podiatry and wound culture has sent, Zosyn and Vancomycin has started. The ID consult requested to assist with further evaluation and antibiotic management.     # Right Fifth metatarsal DFU with drainage and Osteomyelitis- wound cx grew Enterococcus, Aeromonas and Klebsiella - Zosyn is the ideal to cover All organisms but kidney function is worsening, hence change to Unasyn and Levaquin until kidney function is improved - The pathology shows Bone with acute osteomyelitis and necrotic periosteal tissue.   # OREN- s/p urinary retention -s/p ibrahim catheter - s/p discontinue ACEI  # RLL pneumonia- most likely Aspiration, S/p Vomiting - On Unasyn  # Large bowel distension  # Candiduria- 9/22/21  # Pulmonary edema with bilateral moderate to large pleural effusions- on CT chest, 10/5/21- s/p intubation 10/7- s/p Right sided chest tube placement by CT team, 10/8/21  # COVID Exposure - PCR negative as of  10/11/21, 10/21/21 and re-exposed and in [place] until 11/1/21    would recommend:    1. OOB to chair/PT   2. Monitor kidney function closely while on Zosyn   3. Aspiration precaution    4.. Wound care as per Podiatry  5. Continue Zosyn until 11/3/21  X 3 more days       Attending Attestation:    Spent more than 35 minutes on total encounter, more than 50 % of the visit was spent counseling and/or coordinating care by the Attending physician.

## 2021-10-31 NOTE — PROGRESS NOTE ADULT - SUBJECTIVE AND OBJECTIVE BOX
Patient is seen and examined at the bed side, is afebrile. The WBC count stay normal.       REVIEW OF SYSTEMS: All other review systems are negative      ALLERGIES: No Known Allergies      Vital Signs Last 24 Hrs  T(C): 36.4 (31 Oct 2021 13:16), Max: 36.6 (31 Oct 2021 05:06)  T(F): 97.6 (31 Oct 2021 13:16), Max: 97.8 (31 Oct 2021 05:06)  HR: 64 (31 Oct 2021 13:16) (64 - 79)  BP: 106/42 (31 Oct 2021 13:16) (104/58 - 143/65)  BP(mean): --  RR: 16 (31 Oct 2021 13:16) (16 - 18)  SpO2: 100% (31 Oct 2021 13:16) (100% - 100%)      PHYSICAL EXAM:  GENERAL: Not in distress  CHEST/LUNG:  Not using accessory muscles, s/p removal of Right CT   HEART: s1 and s2 present  ABDOMEN:  Mild distended   EXTREMITIES: Right foot bandage in placed   CNS: Awake and alert       LABS:                         10.0   4.88  )-----------( 276      ( 31 Oct 2021 06:26 )             31.1                           9.9    5.03  )-----------( 302      ( 29 Oct 2021 06:09 )             30.4       10-31    141  |  109<H>  |  9   ----------------------------<  109<H>  3.7   |  24  |  1.26    Ca    9.3      31 Oct 2021 06:26      10-27    140  |  109<H>  |  8   ----------------------------<  117<H>  4.2   |  24  |  1.25    Ca    8.9      27 Oct 2021 06:23    TPro  7.1  /  Alb  2.6<L>  /  TBili  0.7  /  DBili  x   /  AST  27  /  ALT  34  /  AlkPhos  125<H>  10-25        09-25    139  |  107  |  41<H>  ----------------------------<  134<H>  4.1   |  21<L>  |  4.05<H>    Ca    8.6      25 Sep 2021 06:40    TPro  6.6  /  Alb  2.3<L>  /  TBili  0.5  /  DBili  x   /  AST  25  /  ALT  15  /  AlkPhos  102  09-25        CAPILLARY BLOOD GLUCOSE  POCT Blood Glucose.: 134 mg/dL (17 Sep 2021 17:11)  POCT Blood Glucose.: 128 mg/dL (17 Sep 2021 11:06)  POCT Blood Glucose.: 157 mg/dL (17 Sep 2021 04:10)      Vancomycin Level, Trough (10.14.21 @ 11:32) : 21.8  Vancomycin Level, Trough (10.12.21 @ 12:13) : 19.5:      MEDICATIONS  (STANDING):    apixaban 5 milliGRAM(s) Oral every 12 hours  aspirin  chewable 81 milliGRAM(s) Oral daily  atorvastatin 80 milliGRAM(s) Oral at bedtime  chlorhexidine 2% Cloths 1 Application(s) Topical <User Schedule>  collagenase Ointment 1 Application(s) Topical daily  cyanocobalamin 1000 MICROGram(s) Oral daily  dextrose 5% + sodium chloride 0.45%. 1000 milliLiter(s) (75 mL/Hr) IV Continuous <Continuous>  dextrose 5%. 1000 milliLiter(s) (100 mL/Hr) IV Continuous <Continuous>  ergocalciferol 61993 Unit(s) Oral <User Schedule>  ferrous    sulfate Liquid 300 milliGRAM(s) Enteral Tube daily  finasteride 5 milliGRAM(s) Oral daily  gabapentin 100 milliGRAM(s) Oral three times a day  glucagon  Injectable 1 milliGRAM(s) IntraMuscular once  insulin lispro (ADMELOG) corrective regimen sliding scale   SubCutaneous Before meals and at bedtime  metoprolol tartrate 25 milliGRAM(s) Oral two times a day  NIFEdipine XL 60 milliGRAM(s) Oral daily  pantoprazole    Tablet 40 milliGRAM(s) Oral before breakfast  piperacillin/tazobactam IVPB.. 3.375 Gram(s) IV Intermittent every 8 hours        RADIOLOGY & ADDITIONAL TESTS:    10/5/21 CT Chest No Cont (10.05.21 @ 14:07) Pulmonary edema with bilateral moderate to large pleural effusions. Underlying bilateral lower lobe compressive atelectasis versus pneumonia.  Mildly enlarged mediastinal lymph nodes, possibly reactive.      10/2/21: Xray Chest 1 View- PORTABLE-Routine (Xray Chest 1 View- PORTABLE-Routine in AM.) (10.02.21 @ 09:46) There is persistent bilateral perihilar/basilar diffuse airspace disease and/or RIGHT effusion.  Cardiomegaly.  No interval change.    Collected Date/Time: 9/22/2021 08:42 EDT   Received Date/Time: 9/23/2021 09:29 EDT   Surgical Pathology Report - Auth (Verified)   Specimen(s) Submitted   1 Right 5th Toe Wound Debridement r/o Osteomyelitis   Final Diagnosis   Right fifth toe; wound debridement:   - Bone with acute osteomyelitis and necrotic periosteal tissue.     9/28/21: US Abdomen Upper Quadrant Right (09.28.21 @ 12:13) Cholelithiasis without evidence of acute cholecystitis. Note is made of right pleural effusion    9/27/21: CT Abdomen and Pelvis w/ Oral Cont (09.27.21 @ 15:53) >No acute intra-abdominal pathology.    Small bilateral pleural effusions with compressive atelectasis of both lower lobes.    9/26/21: Xray Chest 1 View-PORTABLE IMMEDIATE (Xray Chest 1 View-PORTABLE IMMEDIATE .) (09.26.21 @ 15:28) : Small bilateral pleural effusions and mild pulmonary edema. A right lower lobe pneumonia is not excluded.      9/26/21: Xray Abdomen 1 View Portable, IMMEDIATE (Xray Abdomen 1 View Portable, IMMEDIATE .) (09.26.21 @ 15:28) : There is a nasogastric tube with its tip in the stomach. There is gaseous distention of the colon. No pathologic calcifications are seen. The osseous structures are intact with degenerative change is present in the spine.      9/17/21 : MR Foot No Cont, Right (09.17.21 @ 13:04) : Resection at the mid aspect of the fifth metatarsal. Lateral soft tissue wound with marrow signal abnormality throughout the fifth metatarsal and within the adjacent fourth metatarsal head which is nonspecific in the setting of recent surgery although osteomyelitis is suspected.    9/16/21 : Xray Foot AP + Lateral + Oblique, Right (09.16.21 @ 15:03) : No acute finding. No plain film evidence of osteomyelitis.        MICROBIOLOGY DATA:    COVID-19 PCR (10.11.21 @ 05:44)   COVID-19 PCR: NotDetec:   Urine Microscopic-Add On (NC) (09.22.21 @ 16:14)   Red Blood Cell - Urine: 0-2 /HPF   White Blood Cell - Urine: 3-5 /HPF   Bacteria: Moderate /HPF   Comment - Urine: yeast cells present   Epithelial Cells: Moderate /HPF     Culture - Tissue with Gram Stain (09.22.21 @ 13:54)   Gram Stain: No polymorphonuclear cells seen per low power field   No organisms seen per oil power field   Specimen Source: .Tissue Right 5th toe r/o osteomyeltis   Culture Results: No growth     COVID-19 David Domain Antibody (09.18.21 @ 12:55)   COVID-19 David Domain Antibody Result: >250.00:    Culture - Blood (09.17.21 @ 10:28)   Specimen Source: .Blood Blood-Peripheral   Culture Results: No growth to date.     Culture - Blood (09.17.21 @ 10:28)   Specimen Source: .Blood Blood-Venous   Culture Results: No growth to date.     Culture - Surgical Swab (09.16.21 @ 21:42)   - Amikacin: S <=16   - Amikacin: S <=16   - Amoxicillin/Clavulanic Acid: S <=8/4   - Ampicillin: R >16 These ampicillin results predict results for amoxicillin   - Ampicillin: S <=2 Predicts results to ampicillin/sulbactam, amoxacillin-clavulanate and piperacillin-tazobactam.   - Ampicillin/Sulbactam: S 8/4 Enterobacter, Citrobacter, and Serratia may develop resistance during prolonged therapy (3-4 days)   - Ampicillin/Sulbactam: R >16/8   - Aztreonam: S <=4   - Aztreonam: S <=4   - Cefazolin: R 16 Enterobacter, Citrobacter, and Serratia may develop resistance during prolonged therapy (3-4 days)   - Cefazolin: R >16   - Cefepime: S <=2   - Cefepime: S <=2   - Cefoxitin: S <=8   - Cefoxitin: S <=8   - Ceftazidime: S <=1   - Ceftriaxone: S <=1   - Ceftriaxone: S <=1 Enterobacter, Citrobacter, and Serratia may develop resistance during prolonged therapy   - Ciprofloxacin: S <=0.25   - Ciprofloxacin: S <=0.25   - Ertapenem: S <=0.5   - Gentamicin: S <=2   - Gentamicin: S <=2   - Imipenem: S <=1   - Levofloxacin: S <=0.5   - Levofloxacin: S <=0.5   - Meropenem: S <=1   - Meropenem: S <=1   - Piperacillin/Tazobactam: S <=8   - Piperacillin/Tazobactam: S <=8   - Tetra/Doxy: S 4   - Tobramycin: S <=2   - Trimethoprim/Sulfamethoxazole: S <=0.5/9.5   - Trimethoprim/Sulfamethoxazole: S <=0.5/9.5   - Vancomycin: S 2   Specimen Source: .Surgical Swab right foot wound   Culture Results:   Culture yields >4 types of aerobic and/or anaerobic bacteria   Call client services within 7 days if further workup is clinically   indicated. Culture includes   Rare Aeromonas hydrophila/caviae   Few Klebsiella oxytoca/Raoutella ornithinolytica   Few Enterococcus faecalis   Few Streptococcus agalactiae (Group B) isolated   Group B streptococci are susceptible to ampicillin,   penicillin and cefazolin, but may be resistant to   erythromycin and clindamycin.   Recommendations for intrapartum prophylaxis for Group B   streptococci are penicillin or ampicillin.   Organism Identification: Aeromonas hydrophila/caviae   Klebsiella oxytoca /Raoutella ornithinolytica   Enterococcus faecalis   Organism: Aeromonas hydrophila/caviae   Organism: Klebsiella oxytoca /Raoutella ornithinolytica   Organism: Enterococcus faecalis   COVID-19 PCR . (09.16.21 @ 22:24)   COVID-19 PCR: NotDetec:

## 2021-11-01 LAB
ANION GAP SERPL CALC-SCNC: 8 MMOL/L — SIGNIFICANT CHANGE UP (ref 5–17)
BASOPHILS # BLD AUTO: 0.04 K/UL — SIGNIFICANT CHANGE UP (ref 0–0.2)
BASOPHILS NFR BLD AUTO: 0.9 % — SIGNIFICANT CHANGE UP (ref 0–2)
BUN SERPL-MCNC: 9 MG/DL — SIGNIFICANT CHANGE UP (ref 7–18)
CALCIUM SERPL-MCNC: 9.1 MG/DL — SIGNIFICANT CHANGE UP (ref 8.4–10.5)
CHLORIDE SERPL-SCNC: 109 MMOL/L — HIGH (ref 96–108)
CO2 SERPL-SCNC: 24 MMOL/L — SIGNIFICANT CHANGE UP (ref 22–31)
CREAT SERPL-MCNC: 1.15 MG/DL — SIGNIFICANT CHANGE UP (ref 0.5–1.3)
EOSINOPHIL # BLD AUTO: 0.25 K/UL — SIGNIFICANT CHANGE UP (ref 0–0.5)
EOSINOPHIL NFR BLD AUTO: 5.6 % — SIGNIFICANT CHANGE UP (ref 0–6)
GLUCOSE BLDC GLUCOMTR-MCNC: 106 MG/DL — HIGH (ref 70–99)
GLUCOSE BLDC GLUCOMTR-MCNC: 112 MG/DL — HIGH (ref 70–99)
GLUCOSE BLDC GLUCOMTR-MCNC: 119 MG/DL — HIGH (ref 70–99)
GLUCOSE BLDC GLUCOMTR-MCNC: 125 MG/DL — HIGH (ref 70–99)
GLUCOSE BLDC GLUCOMTR-MCNC: 99 MG/DL — SIGNIFICANT CHANGE UP (ref 70–99)
GLUCOSE SERPL-MCNC: 104 MG/DL — HIGH (ref 70–99)
HCT VFR BLD CALC: 31.2 % — LOW (ref 39–50)
HGB BLD-MCNC: 10 G/DL — LOW (ref 13–17)
IMM GRANULOCYTES NFR BLD AUTO: 0.2 % — SIGNIFICANT CHANGE UP (ref 0–1.5)
LYMPHOCYTES # BLD AUTO: 1.46 K/UL — SIGNIFICANT CHANGE UP (ref 1–3.3)
LYMPHOCYTES # BLD AUTO: 33 % — SIGNIFICANT CHANGE UP (ref 13–44)
MAGNESIUM SERPL-MCNC: 2 MG/DL — SIGNIFICANT CHANGE UP (ref 1.6–2.6)
MCHC RBC-ENTMCNC: 29.1 PG — SIGNIFICANT CHANGE UP (ref 27–34)
MCHC RBC-ENTMCNC: 32.1 GM/DL — SIGNIFICANT CHANGE UP (ref 32–36)
MCV RBC AUTO: 90.7 FL — SIGNIFICANT CHANGE UP (ref 80–100)
MONOCYTES # BLD AUTO: 0.26 K/UL — SIGNIFICANT CHANGE UP (ref 0–0.9)
MONOCYTES NFR BLD AUTO: 5.9 % — SIGNIFICANT CHANGE UP (ref 2–14)
NEUTROPHILS # BLD AUTO: 2.41 K/UL — SIGNIFICANT CHANGE UP (ref 1.8–7.4)
NEUTROPHILS NFR BLD AUTO: 54.4 % — SIGNIFICANT CHANGE UP (ref 43–77)
NRBC # BLD: 0 /100 WBCS — SIGNIFICANT CHANGE UP (ref 0–0)
PHOSPHATE SERPL-MCNC: 2.8 MG/DL — SIGNIFICANT CHANGE UP (ref 2.5–4.5)
PLATELET # BLD AUTO: 245 K/UL — SIGNIFICANT CHANGE UP (ref 150–400)
POTASSIUM SERPL-MCNC: 3.6 MMOL/L — SIGNIFICANT CHANGE UP (ref 3.5–5.3)
POTASSIUM SERPL-SCNC: 3.6 MMOL/L — SIGNIFICANT CHANGE UP (ref 3.5–5.3)
RBC # BLD: 3.44 M/UL — LOW (ref 4.2–5.8)
RBC # FLD: 14.7 % — HIGH (ref 10.3–14.5)
SARS-COV-2 RNA SPEC QL NAA+PROBE: SIGNIFICANT CHANGE UP
SODIUM SERPL-SCNC: 141 MMOL/L — SIGNIFICANT CHANGE UP (ref 135–145)
WBC # BLD: 4.43 K/UL — SIGNIFICANT CHANGE UP (ref 3.8–10.5)
WBC # FLD AUTO: 4.43 K/UL — SIGNIFICANT CHANGE UP (ref 3.8–10.5)

## 2021-11-01 PROCEDURE — 99232 SBSQ HOSP IP/OBS MODERATE 35: CPT

## 2021-11-01 RX ADMIN — ATORVASTATIN CALCIUM 80 MILLIGRAM(S): 80 TABLET, FILM COATED ORAL at 21:43

## 2021-11-01 RX ADMIN — CHLORHEXIDINE GLUCONATE 1 APPLICATION(S): 213 SOLUTION TOPICAL at 06:10

## 2021-11-01 RX ADMIN — Medication 81 MILLIGRAM(S): at 11:49

## 2021-11-01 RX ADMIN — Medication 60 MILLIGRAM(S): at 06:10

## 2021-11-01 RX ADMIN — PIPERACILLIN AND TAZOBACTAM 25 GRAM(S): 4; .5 INJECTION, POWDER, LYOPHILIZED, FOR SOLUTION INTRAVENOUS at 21:42

## 2021-11-01 RX ADMIN — GABAPENTIN 100 MILLIGRAM(S): 400 CAPSULE ORAL at 13:37

## 2021-11-01 RX ADMIN — PIPERACILLIN AND TAZOBACTAM 25 GRAM(S): 4; .5 INJECTION, POWDER, LYOPHILIZED, FOR SOLUTION INTRAVENOUS at 06:10

## 2021-11-01 RX ADMIN — PREGABALIN 1000 MICROGRAM(S): 225 CAPSULE ORAL at 11:49

## 2021-11-01 RX ADMIN — FINASTERIDE 5 MILLIGRAM(S): 5 TABLET, FILM COATED ORAL at 11:49

## 2021-11-01 RX ADMIN — Medication 25 MILLIGRAM(S): at 06:10

## 2021-11-01 RX ADMIN — Medication 1 APPLICATION(S): at 11:49

## 2021-11-01 RX ADMIN — PANTOPRAZOLE SODIUM 40 MILLIGRAM(S): 20 TABLET, DELAYED RELEASE ORAL at 06:10

## 2021-11-01 RX ADMIN — PIPERACILLIN AND TAZOBACTAM 25 GRAM(S): 4; .5 INJECTION, POWDER, LYOPHILIZED, FOR SOLUTION INTRAVENOUS at 13:37

## 2021-11-01 RX ADMIN — GABAPENTIN 100 MILLIGRAM(S): 400 CAPSULE ORAL at 21:42

## 2021-11-01 RX ADMIN — Medication 25 MILLIGRAM(S): at 17:50

## 2021-11-01 RX ADMIN — APIXABAN 5 MILLIGRAM(S): 2.5 TABLET, FILM COATED ORAL at 06:10

## 2021-11-01 RX ADMIN — APIXABAN 5 MILLIGRAM(S): 2.5 TABLET, FILM COATED ORAL at 17:50

## 2021-11-01 RX ADMIN — GABAPENTIN 100 MILLIGRAM(S): 400 CAPSULE ORAL at 06:10

## 2021-11-01 RX ADMIN — Medication 300 MILLIGRAM(S): at 11:49

## 2021-11-01 NOTE — PROGRESS NOTE ADULT - PROBLEM SELECTOR PLAN 6
Controlled ( HA1c 6.6, Pt takes Lantus at home)  Continue sliding scale insulin coverage  blood glucose levels currently within acceptable levels   Continue glucose monitoring  Continue consistent carb diet

## 2021-11-01 NOTE — PROGRESS NOTE ADULT - PROBLEM SELECTOR PLAN 5
Controlled  Pt takes Lisinopril, Nifedipine and Metoprolol at home  Continue home regimen Nifedipine with parameters  Continue reduced dose Metoprolol with parameters  Lisinopril was held previously in the setting of OREN   Monitor BP

## 2021-11-01 NOTE — PROGRESS NOTE ADULT - PROBLEM SELECTOR PLAN 1
MRI foot suspicious for OM as noted above  Wound culture grew Enterococcus Aeromonal and Klebsiella  Continue Zosyn until 11/3  PICC in place  Podiatry dressing change  Dr. Barrett, ID, following   Podiatry following

## 2021-11-01 NOTE — PROGRESS NOTE ADULT - PROBLEM SELECTOR PLAN 9
Pt will need SABIHA and R&L heart cath outpatient after infection clears   Dr. Wilkins/ [facility], following

## 2021-11-01 NOTE — PROGRESS NOTE ADULT - PROBLEM SELECTOR PLAN 8
OREN resolved  Scr now 1.1  Failed TOV- will need to continue ibrahim and outpatient f/u with Urology  closely monitor SCr, as patient is  on Zosyn

## 2021-11-01 NOTE — PROGRESS NOTE ADULT - SUBJECTIVE AND OBJECTIVE BOX
C A R D I O L O G Y  **********************************    DATE OF SERVICE: 11-01-21    Patient denies chest pain or shortness of breath.   Review of symptoms otherwise negative.    acetaminophen   Tablet .. 650 milliGRAM(s) Oral every 6 hours PRN  apixaban 5 milliGRAM(s) Oral every 12 hours  aspirin  chewable 81 milliGRAM(s) Oral daily  atorvastatin 80 milliGRAM(s) Oral at bedtime  chlorhexidine 2% Cloths 1 Application(s) Topical <User Schedule>  collagenase Ointment 1 Application(s) Topical daily  cyanocobalamin 1000 MICROGram(s) Oral daily  dextrose 5% + sodium chloride 0.45%. 1000 milliLiter(s) IV Continuous <Continuous>  dextrose 5%. 1000 milliLiter(s) IV Continuous <Continuous>  ergocalciferol 39206 Unit(s) Oral <User Schedule>  ferrous    sulfate Liquid 300 milliGRAM(s) Enteral Tube daily  finasteride 5 milliGRAM(s) Oral daily  gabapentin 100 milliGRAM(s) Oral three times a day  glucagon  Injectable 1 milliGRAM(s) IntraMuscular once  insulin lispro (ADMELOG) corrective regimen sliding scale   SubCutaneous Before meals and at bedtime  metoprolol tartrate 25 milliGRAM(s) Oral two times a day  NIFEdipine XL 60 milliGRAM(s) Oral daily  ondansetron Injectable 4 milliGRAM(s) IV Push three times a day PRN  pantoprazole    Tablet 40 milliGRAM(s) Oral before breakfast  piperacillin/tazobactam IVPB.. 3.375 Gram(s) IV Intermittent every 8 hours  sodium chloride 0.9% lock flush 10 milliLiter(s) IV Push every 1 hour PRN                            10.0   4.43  )-----------( 245      ( 01 Nov 2021 05:55 )             31.2       Hemoglobin: 10.0 g/dL (11-01 @ 05:55)  Hemoglobin: 10.0 g/dL (10-31 @ 06:26)  Hemoglobin: 9.9 g/dL (10-29 @ 06:09)      11-01    141  |  109<H>  |  9   ----------------------------<  104<H>  3.6   |  24  |  1.15    Ca    9.1      01 Nov 2021 05:55  Phos  2.8     11-01  Mg     2.0     11-01      Creatinine Trend: 1.15<--, 1.26<--, 1.19<--, 1.25<--, 1.17<--, 1.22<--    COAGS:           T(C): 36.6 (11-01-21 @ 05:30), Max: 36.8 (10-31-21 @ 20:44)  HR: 80 (11-01-21 @ 05:30) (64 - 80)  BP: 130/71 (11-01-21 @ 05:30) (106/42 - 130/71)  RR: 16 (11-01-21 @ 05:30) (16 - 18)  SpO2: 100% (11-01-21 @ 05:30) (98% - 100%)  Wt(kg): --    I&O's Summary    31 Oct 2021 07:01  -  01 Nov 2021 07:00  --------------------------------------------------------  IN: 0 mL / OUT: 800 mL / NET: -800 mL        HEENT:  (-)icterus (-)pallor  CV: N S1 S2 1/6 TRINIDAD (+)2 Pulses B/l  Resp:  Coarse sounds B/L, normal effort  GI: (+) BS Soft, NT, ND  Lymph:  (-)Edema, (-)obvious lymphadenopathy  Skin: Warm to touch, Normal turgor  Psych:  appropriate mood and affect        ASSESSMENT/PLAN: 	76y  Male PMH DM on insulin, HTN, HLD, normal lV fx moderate to severe AS 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    - crt improved  -  complete course of Abx per ID   - Echo noted, Severe AS, EF 40-45%   - He will need a SABIHA and R+L heart cath when he recovers and has no active infection  - Cont medical management of CAD  - Pt. with new onset PAF now on Eliquis  - Pig tail removed on 10/16  - No need for further inpatient cardiac work up.       Zak Wilkins MD, [facility]  [facility]PER [phone]

## 2021-11-01 NOTE — PROGRESS NOTE ADULT - SUBJECTIVE AND OBJECTIVE BOX
`Patient is a 76y old  Male who presents with a chief complaint of R Foot Pain (01 Nov 2021 14:57)    PATIENT IS SEEN AND EXAMINED IN MEDICAL FLOOR.  KEENANT [    ]    MADDY [   ]      GT [   ]    ALLERGIES:  No Known Allergies      Daily     Daily     VITALS:    Vital Signs Last 24 Hrs  T(C): 36.8 (01 Nov 2021 20:56), Max: 36.8 (01 Nov 2021 20:56)  T(F): 98.2 (01 Nov 2021 20:56), Max: 98.2 (01 Nov 2021 20:56)  HR: 68 (01 Nov 2021 20:56) (68 - 80)  BP: 125/53 (01 Nov 2021 20:56) (125/53 - 145/69)  BP(mean): --  RR: 18 (01 Nov 2021 20:56) (16 - 18)  SpO2: 100% (01 Nov 2021 20:56) (100% - 100%)    LABS:    CBC Full  -  ( 01 Nov 2021 05:55 )  WBC Count : 4.43 K/uL  RBC Count : 3.44 M/uL  Hemoglobin : 10.0 g/dL  Hematocrit : 31.2 %  Platelet Count - Automated : 245 K/uL  Mean Cell Volume : 90.7 fl  Mean Cell Hemoglobin : 29.1 pg  Mean Cell Hemoglobin Concentration : 32.1 gm/dL  Auto Neutrophil # : 2.41 K/uL  Auto Lymphocyte # : 1.46 K/uL  Auto Monocyte # : 0.26 K/uL  Auto Eosinophil # : 0.25 K/uL  Auto Basophil # : 0.04 K/uL  Auto Neutrophil % : 54.4 %  Auto Lymphocyte % : 33.0 %  Auto Monocyte % : 5.9 %  Auto Eosinophil % : 5.6 %  Auto Basophil % : 0.9 %      11-01    141  |  109<H>  |  9   ----------------------------<  104<H>  3.6   |  24  |  1.15    Ca    9.1      01 Nov 2021 05:55  Phos  2.8     11-01  Mg     2.0     11-01      CAPILLARY BLOOD GLUCOSE      POCT Blood Glucose.: 112 mg/dL (01 Nov 2021 21:17)  POCT Blood Glucose.: 125 mg/dL (01 Nov 2021 16:54)  POCT Blood Glucose.: 119 mg/dL (01 Nov 2021 11:19)  POCT Blood Glucose.: 106 mg/dL (01 Nov 2021 07:33)  POCT Blood Glucose.: 99 mg/dL (01 Nov 2021 07:32)          Creatinine Trend: 1.15<--, 1.26<--, 1.19<--, 1.25<--, 1.17<--, 1.22<--  I&O's Summary    31 Oct 2021 07:01  -  01 Nov 2021 07:00  --------------------------------------------------------  IN: 0 mL / OUT: 800 mL / NET: -800 mL            .Body Fluid Pleural Fluid  10-08 @ 18:51   No growth at 1 week.  --  --      .Body Fluid Pleural Fluid  10-08 @ 18:34   No growth at 5 days  --    polymorphonuclear leukocytes seen  No organisms seen  by cytocentrifuge      .Tissue Right 5th toe r/o osteomyeltis  09-22 @ 13:54   No growth at 5 days  --    No polymorphonuclear cells seen per low power field  No organisms seen per oil power field      .Blood Blood-Venous  09-17 @ 10:28   No Growth Final  --  --      .Surgical Swab right foot wound  09-16 @ 21:42   Culture yields >4 types of aerobic and/or anaerobic bacteria  Call client services within 7 days if further workup is clinically  indicated. Culture includes  Rare Aeromonas hydrophila/caviae  Few Klebsiella oxytoca/Raoutella ornithinolytica  Few Enterococcus faecalis  Few Streptococcus agalactiae (Group B) isolated  Group B streptococci are susceptible to ampicillin,  penicillin and cefazolin, but may be resistant to  erythromycin and clindamycin.  Recommendations for intrapartum prophylaxis for Group B  streptococci are penicillin or ampicillin.  --  Aeromonas hydrophila/caviae  Klebsiella oxytoca /Raoutella ornithinolytica  Enterococcus faecalis      Bone R 5th metatarsal bone clean ma  08-20 @ 03:59   No growth at 5 days  --    No polymorphonuclear leukocytes seen per low power field  No organisms seen per oil power field      .Blood Blood-Venous  08-14 @ 21:09   No Growth Final  --  --      .Surgical Swab Right foot plantar wound  08-14 @ 21:08   Moderate Streptococcus agalactiae (Group B) isolated  Group B streptococci are susceptible to ampicillin,  penicillin and cefazolin, but may be resistant to  erythromycin and clindamycin.  Recommendations for intrapartum prophylaxis for Group B  streptococci are penicillin or ampicillin.  Moderate Bacteroides fragilis "Susceptibilities not performed"  Normal skin filiberto isolated  --  --          MEDICATIONS:    MEDICATIONS  (STANDING):  apixaban 5 milliGRAM(s) Oral every 12 hours  aspirin  chewable 81 milliGRAM(s) Oral daily  atorvastatin 80 milliGRAM(s) Oral at bedtime  chlorhexidine 2% Cloths 1 Application(s) Topical <User Schedule>  collagenase Ointment 1 Application(s) Topical daily  cyanocobalamin 1000 MICROGram(s) Oral daily  dextrose 5% + sodium chloride 0.45%. 1000 milliLiter(s) (75 mL/Hr) IV Continuous <Continuous>  dextrose 5%. 1000 milliLiter(s) (100 mL/Hr) IV Continuous <Continuous>  ergocalciferol 45328 Unit(s) Oral <User Schedule>  ferrous    sulfate Liquid 300 milliGRAM(s) Enteral Tube daily  finasteride 5 milliGRAM(s) Oral daily  gabapentin 100 milliGRAM(s) Oral three times a day  glucagon  Injectable 1 milliGRAM(s) IntraMuscular once  insulin lispro (ADMELOG) corrective regimen sliding scale   SubCutaneous Before meals and at bedtime  metoprolol tartrate 25 milliGRAM(s) Oral two times a day  NIFEdipine XL 60 milliGRAM(s) Oral daily  pantoprazole    Tablet 40 milliGRAM(s) Oral before breakfast  piperacillin/tazobactam IVPB.. 3.375 Gram(s) IV Intermittent every 8 hours      MEDICATIONS  (PRN):  acetaminophen   Tablet .. 650 milliGRAM(s) Oral every 6 hours PRN Temp greater or equal to 38C (100.4F), Moderate Pain (4 - 6)  ondansetron Injectable 4 milliGRAM(s) IV Push three times a day PRN Nausea and/or Vomiting  sodium chloride 0.9% lock flush 10 milliLiter(s) IV Push every 1 hour PRN Pre/post blood products, medications, blood draw, and to maintain line patency      REVIEW OF SYSTEMS:                           ALL ROS DONE [ X   ]    CONSTITUTIONAL:  LETHARGIC [   ], FEVER [   ], UNRESPONSIVE [   ]  CVS:  CP  [   ], SOB, [   ], PALPITATIONS [   ], DIZZYNESS [   ]  RS: COUGH [   ], SPUTUM [   ]  GI: ABDOMINAL PAIN [   ], NAUSEA [   ], VOMITINGS [   ], DIARRHEA [   ], CONSTIPATION [   ]  :  DYSURIA [   ], NOCTURIA [   ], INCREASED FREQUENCY [   ], DRIBLING [   ],  SKELETAL: PAINFUL JOINTS [   ], SWOLLEN JOINTS [   ], NECK ACHE [   ], LOW BACK ACHE [   ],  SKIN : ULCERS [   ], RASH [   ], ITCHING [   ]  CNS: HEAD ACHE [   ], DOUBLE VISION [   ], BLURRED VISION [   ], AMS / CONFUSION [   ], SEIZURES [   ], WEAKNESS [   ],TINGLING / NUMBNESS [   ]    PHYSICAL EXAMINATION:    GENERAL APPEARANCE: NO DISTRESS  HEENT:  NO PALLOR, NO  JVD,  NO   NODES, NECK SUPPLE  CVS: S1 +, S2 +,   RS: AEEB,  OCCASIONAL  RALES +,  RONCHI +  ABD: SOFT, NT, NO, BS +       EXT: NO PE  SKIN: WARM,   RIGHT FOOT WRAPPED  SKELETAL:  ROM ACCEPTABLE  CNS:  AAO X  2-3        RADIOLOGY :    < from: Transthoracic Echocardiogram (10.07.21 @ 15:26) >  CONCLUSIONS:  1. Normal mitral valve. Moderate mitral regurgitation.  2. Calcified aortic valve with decreased opening, cannot  exclude bicuspid. Peak transaortic valve gradient equals  32.4 mm Hg, mean transaortic valve gradient equals 19 mm  Hg, dimensionless index 0.22, estimated aortic valve area  equals 0.6sqcm (by continuity equation), consistent with  paradoxical low-flow low-gradient severe aortic stenosis.  Consider dobutamine stress echocardiogram and/or SABIHA for  further evaluation.  No aortic valve regurgitation seen.  3. Normal aortic root.  4. Normal left atrium.  5. Moderate concentric left ventricular hypertrophy.  6. Moderate global left ventricular systolic dysfunction  (EF 40-45% by visual estimation).  7. Grade II diastolic dysfunction (moderate).  8. Normal right atrium.  9. Normal right ventricular size with decreased RV systolic  function (TAPSE 1.2 cm).  10. RV systolic pressure is borderline normal at  34 mm Hg.  11. Tricuspid valve not well seen. Trace tricuspid  regurgitation.  12. Normal pulmonic valve. Trace pulmonic insufficiency is  noted.  13. No pericardial effusion.  14. Bilateral pleural effusions.    < end of copied text >      EXAM:  CT ABDOMEN AND PELVIS OC                            PROCEDURE DATE:  09/27/2021          INTERPRETATION:  CLINICAL INFORMATION: Small bowel obstruction.    COMPARISON: None.    CONTRAST/COMPLICATIONS:  IV Contrast: NONE  Oral Contrast: Omnipaque 300  Complications: None reported at time of study completion    PROCEDURE:  CT of the Abdomen and Pelvis was performed.  Sagittal and coronal reformats were performed.    FINDINGS:  LOWER CHEST: Small bilateral pleural effusions with compressiveatelectasis of both lower lobes.    LIVER: Within normal limits.  BILE DUCTS: Normal caliber.  GALLBLADDER: Distended. Small layering gallstones.  SPLEEN: Within normal limits.  PANCREAS: Within normal limits.  ADRENALS: Within normal limits.  KIDNEYS/URETERS: No hydronephrosis. Nonobstructing left upper pole calculus, measuring 0.6 cm.    BLADDER: Urinary bladder contains air and a Castañeda catheter balloon.  REPRODUCTIVE ORGANS: Prostate is not enlarged.    BOWEL: No bowel obstruction. Appendix isnormal. Colonic diverticulosis.  PERITONEUM: Mild presacral fluid.  VESSELS: Atherosclerotic changes.  RETROPERITONEUM/LYMPH NODES: No lymphadenopathy.  ABDOMINAL WALL: Within normal limits.  BONES: Degenerative changes. Sternotomy.    IMPRESSION:  No acute intra-abdominal pathology.    Small bilateral pleural effusions with compressive atelectasis of both lower lobes.    ====================================================    EXAM:  MR FOOT RT                            PROCEDURE DATE:  09/17/2021          INTERPRETATION:  Clinical Information: Recent fifth metatarsal head resection now with fifth digit pain, swelling and foul discharge.    Comparison: Radiographs of the right foot from 9/16/2021 and MRI the right foot from 8/16/2021.    Technique:  MRI of the right midfoot and forefoot.  Intravenous Contrast: None.    Findings:    There is a resection at the mid aspect of the fifth metatarsal shaft. There is a lateral soft tissue wound beginning at the level of the amputation which extends distally. There is susceptibility artifact in the region of the amputation consistent with postoperative change. There is hyperintense T2 marrow signal throughout the remaining fifth metatarsal and within the adjacent fourth metatarsal head which is nonspecific and could be related to recent postoperative changes although osteomyelitis is suspected.    There is edema and mild atrophy within the plantar muscles of the foot. There is minimal spurring at the first metatarsophalangeal and hallux sesamoid articulations.    Impression:  Resection at the mid aspect of the fifth metatarsal. Lateral soft tissue wound with marrow signal abnormality throughout the fifth metatarsal and within the adjacent fourth metatarsal head which is nonspecific in the setting of recent surgery although osteomyelitis is suspected.        ASSESSMENT :     Headache    HTN (hypertension)    HLD (hyperlipidemia)    DM (diabetes mellitus)    CAD (coronary artery disease)    Glaucoma, angle-closure    [place] (hard of hearing)      S/P CABG (coronary artery bypass graft)    History of thyroid surgery        PLAN:    HPI:  76M from home, lives with daughter w/ PMH DM on insulin, HTN, HLD, CAD, 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 Pt with sharp pain on the R lateral foot. As per daughter, pt was taking tylenol which did not help his pain prompting the patient to ask his daughter to take him to the hospital. Pt is a poor historian. Daughter states that the patient did not see his podiatrist after the surgery. No fever, chest, pain, palpitations, nausea, vomiting, diarrhea (17 Sep 2021 01:11)      # REPEAT NASAL SWAB FOR COVID 19 IS NEGATIVE - D/C PLAN TO [facility] FOR STEVAN    # CASE D/W PATIENT CARE TEAM CHERELLE DIAL TO HELP ASSIST WITH PATIENT AND FAMILY'S NEEDS AT THIS TIME     # PATIENT IS S/P ICU MONITORING AND RIGHT CHEST TUBE REMOVAL ON 10/15/2021     # COVID EXPOSURE ON 10/13 AND ONCE MORE HAD RE-EXPOSURE ON 10/22 - INFECTION CONTROL IS AWARE AND ADDRESSING - ON CONTACT AND DROPLET ISOLATION PRECAUTION; F/U COVID PCR ON 10/30 AND 11/1 - IF NEGATIVE D/C ISOLATION    # RIGHT FOOT OSTEOMYELITIS S/P RIGHT 5TH RAY RESECTION [ 8/19 ]  AND S/P DEBRIDEMENT, GRAFT APPLICATION, BONE BX AND WOUND VAC APPLICATION 9/22 - BONE BX W/ ACUTE OSTEOMYELITIS AND NECROTIC PERIOSTEAL TISSUE  ** RECENT ADMISSION FOR RIGHT DIABETIC FOOT ULCER, CELLULITIS, OSTEOMYELITIS RIGHT 5th METATARSAL S/P RIGHT 5TH PARTIAL RAY AMPUTATION [8/20] - BONE PATHOLOGY W/ CLEAN MARGINS    - S/P VANCOMYCIN AND ZOSYN ; SWITCHED TO UNASYN AND LEVAQUIN GIVEN OREN; PER ID SWITCH TO ZOSYN UNTIL 11/3   - RECENTLY HAD SIMON W/ MILD PAD  - REVIEWED WOUND CX [ ENTEROCOCCUS, AEROMONAS, KLEBSIELLA] AND BCX  - ID CONSULT, PODIATRY CONSULT    - PICC LINE PLACED TO COMPLETE 6 WEEKS OF ANTIBIOTICS - ON ZOSYN UNTIL 11/3 ON D/C    # ACUTE HYPOXIC RESPIRATORY FAILURE DUE TO ACUTE ON CHRONIC SYSTOLIC CHF  (  LV EF 40- 45 % ) , DIASTOLIC LV DYSFUNCTION , ,  SEVERE AORTIC STENOSIS & MODERATE MITRAL REGURGITATION  &  ASPIRATION PNA;  - S/P CHEST TUBE INSERTION AND REMOVAL  , S/P LASIX ,   CARDIOLOGY CONSULT IN PROGRESS      - CHEST TUBE PLACED [10/8] - FLUID CONSISTENT WITH TRANSUDATIVE PROCESS  - S/P EXTUBATION 10/13  - S/P CHEST TUBE REMOVAL 10/15      # SEPTIC SHOCK - ? S/T HYPOVOLEMIA VS. SEPSIS - S/P CVC [SWITCHED FROM FEMORAL TO LEFT IJ], S/P VASOPRESSORS - RESOLVING  - BCX - NGTD  - ON MEROPENEM      # TRANSIENT NEW ONSET A.FIB, PAROXYSMAL - ON ELIQUIS, CARDIOLOGY CONSULT IN PROGRESS    # FUNGURIA - D/C FLUCONAZOLE GIVEN TRANSAMINITIS    # TRANSAMINITIS - SUSPECT THIS IS ISCHEMIC HEPATITIS - IMPROVING - HEPATOLOGY CONSULT IN PROGRESS    # BOWEL DISTENTION - ? ILEUS - OPTIMIZING ELECTROLYTES - IMPROVED  - SURGERY CONSULT IN PROGRESS    # CHOLELITHIASIS - TRENDING LFTS, RUQ U/S - CHOLELITHIASIS, S/P SURGERY CONSULT   - S/P GI CONSULT - LESS SUSPICIOUS FOR CHOLECYSTITIS    # DYSPEPSIA - PLACED ON PPI BID WITH IMPROVEMENT, UNDERWENT ST EVAL     # ELEVATED TROPONINS - NSTEMI - ? DEMAND - WILL NEED ISCHEMIC EVAL ONCE ACUTE INFECTION RESOLVES PER CARDIOLOGY, CARDIOLOGY CONSULT IN PROGRESS  - ON ASA, STATIN, BB    # OREN ON CKD - S/T ATN AND URINARY RETENTION - IMPROVING - S/P IVF, NOW W/ CASTAÑEDA, NEPHROLOGY CONUSLT IN PROGRESS  -  BPH ON FLOMAX, AND FINASTERIDE  - FAILED TOV WITH WORSENING RENAL FXN - NOTED URINARY RETENTION [10/4] - REPLACED CASTAÑEDA  - TOV 10/18 - BLADDER SCAN BID TO EVALUATE FOR URINARY RETENTION - FAILED TOV  - OVERNIGHT 10/18 - CASTAÑEDA REPLACED    # MEDICAL CLEARANCE FOR SURGERY - RCRI - 2 - 10.1 % 30 DAY RISK OF DEATH, MI OR CARDIAC ARREST  - CARDIOLOGY CONSULT     # DM -  HBA1C - 11  - LANTUS, SSI + FS    # CAD S/P CABG - PLACED ON ASA, STATIN, BB  - ECHO - SEVERE AORTIC STENOSIS, SEVERE CONCENTRIC LVH, G1DD, MILD MN  - CARDIOLOGY CONSULT - DR. BASSETT   - MAY NEED ISCHEMIC EVAL ONCE ACUTE ILLNESS RESOLVES INCLUDING CARDIAC CATHETERIZATION    # HTN, HLD  - ON METOPROLOL, NIFEDIPINE, STATIN     # HARD OF HEARING       #  IMPAIRED GAIT DUE TO CERVICAL & LS SPONDYLOSIS, POLY ARTHRITIS AND DIABETIC PERIPHERAL NEUROPATHY, OP VITAMIN D DEFICIENCY - ON CHOLECALCIFEROL, OBTAIN PT & OT   #  FAILURE TO THRIVE , MODERATE PROTEIN CALORIE MALNUTRITION       # CASE DISCUSSED AT LENGTH WITH PATIENT AND DAUGHTER, BIRDIE ROBERT VIA PHONE @ [phone] [10/25] - CASE DICUSSED AT LENGTH AND ALL QUESTIONS WERE ANSWERED. DAUGHTER UNDERSTOOD THAT PROGNOSIS IS GUARDED.  # PATIENT AND DAUGHTER REFUSED [facility] ON PREVIOUS ADMISSION, PREVIOUSLY WISHED FOR PATIENT RETURN HOME W/ HOME PT; HOWEVER NOW, DAUGHTER WISHES FOR PATIENT TO GO TO [facility] - [facility], AS PATIENT'S WIFE IS CURRENTLY ADMITTED THERE    # GI AND DVT PPX   Patient is a 76y old  Male who presents with a chief complaint of R Foot Pain (01 Nov 2021 14:57)    PATIENT IS SEEN AND EXAMINED IN MEDICAL FLOOR.    ALLERGIES:  No Known Allergies    VITALS:    Vital Signs Last 24 Hrs  T(C): 36.8 (01 Nov 2021 20:56), Max: 36.8 (01 Nov 2021 20:56)  T(F): 98.2 (01 Nov 2021 20:56), Max: 98.2 (01 Nov 2021 20:56)  HR: 68 (01 Nov 2021 20:56) (68 - 80)  BP: 125/53 (01 Nov 2021 20:56) (125/53 - 145/69)  BP(mean): --  RR: 18 (01 Nov 2021 20:56) (16 - 18)  SpO2: 100% (01 Nov 2021 20:56) (100% - 100%)    LABS:    CBC Full  -  ( 01 Nov 2021 05:55 )  WBC Count : 4.43 K/uL  RBC Count : 3.44 M/uL  Hemoglobin : 10.0 g/dL  Hematocrit : 31.2 %  Platelet Count - Automated : 245 K/uL  Mean Cell Volume : 90.7 fl  Mean Cell Hemoglobin : 29.1 pg  Mean Cell Hemoglobin Concentration : 32.1 gm/dL  Auto Neutrophil # : 2.41 K/uL  Auto Lymphocyte # : 1.46 K/uL  Auto Monocyte # : 0.26 K/uL  Auto Eosinophil # : 0.25 K/uL  Auto Basophil # : 0.04 K/uL  Auto Neutrophil % : 54.4 %  Auto Lymphocyte % : 33.0 %  Auto Monocyte % : 5.9 %  Auto Eosinophil % : 5.6 %  Auto Basophil % : 0.9 %      11-01    141  |  109<H>  |  9   ----------------------------<  104<H>  3.6   |  24  |  1.15    Ca    9.1      01 Nov 2021 05:55  Phos  2.8     11-01  Mg     2.0     11-01      CAPILLARY BLOOD GLUCOSE      POCT Blood Glucose.: 112 mg/dL (01 Nov 2021 21:17)  POCT Blood Glucose.: 125 mg/dL (01 Nov 2021 16:54)  POCT Blood Glucose.: 119 mg/dL (01 Nov 2021 11:19)  POCT Blood Glucose.: 106 mg/dL (01 Nov 2021 07:33)  POCT Blood Glucose.: 99 mg/dL (01 Nov 2021 07:32)          Creatinine Trend: 1.15<--, 1.26<--, 1.19<--, 1.25<--, 1.17<--, 1.22<--  I&O's Summary    31 Oct 2021 07:01  -  01 Nov 2021 07:00  --------------------------------------------------------  IN: 0 mL / OUT: 800 mL / NET: -800 mL      .Body Fluid Pleural Fluid  10-08 @ 18:51   No growth at 1 week.  --  --      .Body Fluid Pleural Fluid  10-08 @ 18:34   No growth at 5 days  --    polymorphonuclear leukocytes seen  No organisms seen  by cytocentrifuge      .Tissue Right 5th toe r/o osteomyeltis  09-22 @ 13:54   No growth at 5 days  --    No polymorphonuclear cells seen per low power field  No organisms seen per oil power field      .Blood Blood-Venous  09-17 @ 10:28   No Growth Final  --  --      .Surgical Swab right foot wound  09-16 @ 21:42   Culture yields >4 types of aerobic and/or anaerobic bacteria  Call client services within 7 days if further workup is clinically  indicated. Culture includes  Rare Aeromonas hydrophila/caviae  Few Klebsiella oxytoca/Raoutella ornithinolytica  Few Enterococcus faecalis  Few Streptococcus agalactiae (Group B) isolated  Group B streptococci are susceptible to ampicillin,  penicillin and cefazolin, but may be resistant to  erythromycin and clindamycin.  Recommendations for intrapartum prophylaxis for Group B  streptococci are penicillin or ampicillin.  --  Aeromonas hydrophila/caviae  Klebsiella oxytoca /Raoutella ornithinolytica  Enterococcus faecalis      Bone R 5th metatarsal bone clean ma  08-20 @ 03:59   No growth at 5 days  --    No polymorphonuclear leukocytes seen per low power field  No organisms seen per oil power field      .Blood Blood-Venous  08-14 @ 21:09   No Growth Final  --  --      .Surgical Swab Right foot plantar wound  08-14 @ 21:08   Moderate Streptococcus agalactiae (Group B) isolated  Group B streptococci are susceptible to ampicillin,  penicillin and cefazolin, but may be resistant to  erythromycin and clindamycin.  Recommendations for intrapartum prophylaxis for Group B  streptococci are penicillin or ampicillin.  Moderate Bacteroides fragilis "Susceptibilities not performed"  Normal skin filiberto isolated  --  --      MEDICATIONS:    MEDICATIONS  (STANDING):  apixaban 5 milliGRAM(s) Oral every 12 hours  aspirin  chewable 81 milliGRAM(s) Oral daily  atorvastatin 80 milliGRAM(s) Oral at bedtime  chlorhexidine 2% Cloths 1 Application(s) Topical <User Schedule>  collagenase Ointment 1 Application(s) Topical daily  cyanocobalamin 1000 MICROGram(s) Oral daily  dextrose 5% + sodium chloride 0.45%. 1000 milliLiter(s) (75 mL/Hr) IV Continuous <Continuous>  dextrose 5%. 1000 milliLiter(s) (100 mL/Hr) IV Continuous <Continuous>  ergocalciferol 88550 Unit(s) Oral <User Schedule>  ferrous    sulfate Liquid 300 milliGRAM(s) Enteral Tube daily  finasteride 5 milliGRAM(s) Oral daily  gabapentin 100 milliGRAM(s) Oral three times a day  glucagon  Injectable 1 milliGRAM(s) IntraMuscular once  insulin lispro (ADMELOG) corrective regimen sliding scale   SubCutaneous Before meals and at bedtime  metoprolol tartrate 25 milliGRAM(s) Oral two times a day  NIFEdipine XL 60 milliGRAM(s) Oral daily  pantoprazole    Tablet 40 milliGRAM(s) Oral before breakfast  piperacillin/tazobactam IVPB.. 3.375 Gram(s) IV Intermittent every 8 hours      MEDICATIONS  (PRN):  acetaminophen   Tablet .. 650 milliGRAM(s) Oral every 6 hours PRN Temp greater or equal to 38C (100.4F), Moderate Pain (4 - 6)  ondansetron Injectable 4 milliGRAM(s) IV Push three times a day PRN Nausea and/or Vomiting  sodium chloride 0.9% lock flush 10 milliLiter(s) IV Push every 1 hour PRN Pre/post blood products, medications, blood draw, and to maintain line patency      REVIEW OF SYSTEMS:                           ALL ROS DONE [ X   ]    CONSTITUTIONAL:  LETHARGIC [   ], FEVER [   ], UNRESPONSIVE [   ]  CVS:  CP  [   ], SOB, [   ], PALPITATIONS [   ], DIZZYNESS [   ]  RS: COUGH [   ], SPUTUM [   ]  GI: ABDOMINAL PAIN [   ], NAUSEA [   ], VOMITINGS [   ], DIARRHEA [   ], CONSTIPATION [   ]  :  DYSURIA [   ], NOCTURIA [   ], INCREASED FREQUENCY [   ], DRIBLING [   ],  SKELETAL: PAINFUL JOINTS [   ], SWOLLEN JOINTS [   ], NECK ACHE [   ], LOW BACK ACHE [   ],  SKIN : ULCERS [   ], RASH [   ], ITCHING [   ]  CNS: HEAD ACHE [   ], DOUBLE VISION [   ], BLURRED VISION [   ], AMS / CONFUSION [   ], SEIZURES [   ], WEAKNESS [   ],TINGLING / NUMBNESS [   ]    PHYSICAL EXAMINATION:    GENERAL APPEARANCE: NO DISTRESS  HEENT:  NO PALLOR, NO  JVD,  NO   NODES, NECK SUPPLE  CVS: S1 +, S2 +,   RS: AEEB,  OCCASIONAL  RALES +,  RONCHI +  ABD: SOFT, NT, NO, BS +       EXT: NO PE  SKIN: WARM,   RIGHT FOOT WRAPPED  SKELETAL:  ROM ACCEPTABLE  CNS:  AAO X  2-3      RADIOLOGY :    < from: Transthoracic Echocardiogram (10.07.21 @ 15:26) >  CONCLUSIONS:  1. Normal mitral valve. Moderate mitral regurgitation.  2. Calcified aortic valve with decreased opening, cannot  exclude bicuspid. Peak transaortic valve gradient equals  32.4 mm Hg, mean transaortic valve gradient equals 19 mm  Hg, dimensionless index 0.22, estimated aortic valve area  equals 0.6sqcm (by continuity equation), consistent with  paradoxical low-flow low-gradient severe aortic stenosis.  Consider dobutamine stress echocardiogram and/or SABIHA for  further evaluation.  No aortic valve regurgitation seen.  3. Normal aortic root.  4. Normal left atrium.  5. Moderate concentric left ventricular hypertrophy.  6. Moderate global left ventricular systolic dysfunction  (EF 40-45% by visual estimation).  7. Grade II diastolic dysfunction (moderate).  8. Normal right atrium.  9. Normal right ventricular size with decreased RV systolic  function (TAPSE 1.2 cm).  10. RV systolic pressure is borderline normal at  34 mm Hg.  11. Tricuspid valve not well seen. Trace tricuspid  regurgitation.  12. Normal pulmonic valve. Trace pulmonic insufficiency is  noted.  13. No pericardial effusion.  14. Bilateral pleural effusions.    < end of copied text >      EXAM:  CT ABDOMEN AND PELVIS OC                            PROCEDURE DATE:  09/27/2021          INTERPRETATION:  CLINICAL INFORMATION: Small bowel obstruction.    COMPARISON: None.    CONTRAST/COMPLICATIONS:  IV Contrast: NONE  Oral Contrast: Omnipaque 300  Complications: None reported at time of study completion    PROCEDURE:  CT of the Abdomen and Pelvis was performed.  Sagittal and coronal reformats were performed.    FINDINGS:  LOWER CHEST: Small bilateral pleural effusions with compressiveatelectasis of both lower lobes.    LIVER: Within normal limits.  BILE DUCTS: Normal caliber.  GALLBLADDER: Distended. Small layering gallstones.  SPLEEN: Within normal limits.  PANCREAS: Within normal limits.  ADRENALS: Within normal limits.  KIDNEYS/URETERS: No hydronephrosis. Nonobstructing left upper pole calculus, measuring 0.6 cm.    BLADDER: Urinary bladder contains air and a Castañeda catheter balloon.  REPRODUCTIVE ORGANS: Prostate is not enlarged.    BOWEL: No bowel obstruction. Appendix isnormal. Colonic diverticulosis.  PERITONEUM: Mild presacral fluid.  VESSELS: Atherosclerotic changes.  RETROPERITONEUM/LYMPH NODES: No lymphadenopathy.  ABDOMINAL WALL: Within normal limits.  BONES: Degenerative changes. Sternotomy.    IMPRESSION:  No acute intra-abdominal pathology.    Small bilateral pleural effusions with compressive atelectasis of both lower lobes.    ====================================================    EXAM:  MR FOOT RT                            PROCEDURE DATE:  09/17/2021          INTERPRETATION:  Clinical Information: Recent fifth metatarsal head resection now with fifth digit pain, swelling and foul discharge.    Comparison: Radiographs of the right foot from 9/16/2021 and MRI the right foot from 8/16/2021.    Technique:  MRI of the right midfoot and forefoot.  Intravenous Contrast: None.    Findings:    There is a resection at the mid aspect of the fifth metatarsal shaft. There is a lateral soft tissue wound beginning at the level of the amputation which extends distally. There is susceptibility artifact in the region of the amputation consistent with postoperative change. There is hyperintense T2 marrow signal throughout the remaining fifth metatarsal and within the adjacent fourth metatarsal head which is nonspecific and could be related to recent postoperative changes although osteomyelitis is suspected.    There is edema and mild atrophy within the plantar muscles of the foot. There is minimal spurring at the first metatarsophalangeal and hallux sesamoid articulations.    Impression:  Resection at the mid aspect of the fifth metatarsal. Lateral soft tissue wound with marrow signal abnormality throughout the fifth metatarsal and within the adjacent fourth metatarsal head which is nonspecific in the setting of recent surgery although osteomyelitis is suspected.        ASSESSMENT :     Headache    HTN (hypertension)    HLD (hyperlipidemia)    DM (diabetes mellitus)    CAD (coronary artery disease)    Glaucoma, angle-closure    [place] (hard of hearing)      S/P CABG (coronary artery bypass graft)    History of thyroid surgery        PLAN:    HPI:  76M from home, lives with daughter w/ PMH DM on insulin, HTN, HLD, CAD, 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 Pt with sharp pain on the R lateral foot. As per daughter, pt was taking tylenol which did not help his pain prompting the patient to ask his daughter to take him to the hospital. Pt is a poor historian. Daughter states that the patient did not see his podiatrist after the surgery. No fever, chest, pain, palpitations, nausea, vomiting, diarrhea (17 Sep 2021 01:11)      # REPEAT NASAL SWAB FOR COVID 19 IS NEGATIVE - D/C PLAN TO [facility] FOR STEVAN. PATIENT IS MEDICALLY CLEARED FOR D/C AWAITING STEVAN AUTHORIZATION    # CASE D/W PATIENT CARE TEAM CHERELLE DIAL TO HELP ASSIST WITH PATIENT AND FAMILY'S NEEDS AT THIS TIME     # PATIENT IS S/P ICU MONITORING AND RIGHT CHEST TUBE REMOVAL ON 10/15/2021     # COVID EXPOSURE ON 10/13 AND ONCE MORE HAD RE-EXPOSURE ON 10/22 - INFECTION CONTROL IS AWARE AND ADDRESSING - ON CONTACT AND DROPLET ISOLATION PRECAUTION; F/U COVID PCR ON 10/30 AND 11/1 - IF NEGATIVE D/C ISOLATION    # RIGHT FOOT OSTEOMYELITIS S/P RIGHT 5TH RAY RESECTION [ 8/19 ]  AND S/P DEBRIDEMENT, GRAFT APPLICATION, BONE BX AND WOUND VAC APPLICATION 9/22 - BONE BX W/ ACUTE OSTEOMYELITIS AND NECROTIC PERIOSTEAL TISSUE  ** RECENT ADMISSION FOR RIGHT DIABETIC FOOT ULCER, CELLULITIS, OSTEOMYELITIS RIGHT 5th METATARSAL S/P RIGHT 5TH PARTIAL RAY AMPUTATION [8/20] - BONE PATHOLOGY W/ CLEAN MARGINS    - S/P VANCOMYCIN AND ZOSYN ; SWITCHED TO UNASYN AND LEVAQUIN GIVEN OREN; PER ID SWITCH TO ZOSYN UNTIL 11/3   - RECENTLY HAD SIMON W/ MILD PAD  - REVIEWED WOUND CX [ ENTEROCOCCUS, AEROMONAS, KLEBSIELLA] AND BCX  - ID CONSULT, PODIATRY CONSULT    - PICC LINE PLACED TO COMPLETE 6 WEEKS OF ANTIBIOTICS - ON ZOSYN UNTIL 11/3 ON D/C    # ACUTE HYPOXIC RESPIRATORY FAILURE DUE TO ACUTE ON CHRONIC SYSTOLIC CHF  (  LV EF 40- 45 % ) , DIASTOLIC LV DYSFUNCTION , ,  SEVERE AORTIC STENOSIS & MODERATE MITRAL REGURGITATION  &  ASPIRATION PNA;  - S/P CHEST TUBE INSERTION AND REMOVAL  , S/P LASIX ,   CARDIOLOGY CONSULT IN PROGRESS      - CHEST TUBE PLACED [10/8] - FLUID CONSISTENT WITH TRANSUDATIVE PROCESS  - S/P EXTUBATION 10/13  - S/P CHEST TUBE REMOVAL 10/15      # SEPTIC SHOCK - ? S/T HYPOVOLEMIA VS. SEPSIS - S/P CVC [SWITCHED FROM FEMORAL TO LEFT IJ], S/P VASOPRESSORS - RESOLVING  - BCX - NGTD  - ON MEROPENEM      # TRANSIENT NEW ONSET A.FIB, PAROXYSMAL - ON ELIQUIS, CARDIOLOGY CONSULT IN PROGRESS    # FUNGURIA - D/C FLUCONAZOLE GIVEN TRANSAMINITIS    # TRANSAMINITIS - SUSPECT THIS IS ISCHEMIC HEPATITIS - IMPROVING - HEPATOLOGY CONSULT IN PROGRESS    # BOWEL DISTENTION - ? ILEUS - OPTIMIZING ELECTROLYTES - IMPROVED  - SURGERY CONSULT IN PROGRESS    # CHOLELITHIASIS - TRENDING LFTS, RUQ U/S - CHOLELITHIASIS, S/P SURGERY CONSULT   - S/P GI CONSULT - LESS SUSPICIOUS FOR CHOLECYSTITIS    # DYSPEPSIA - PLACED ON PPI BID WITH IMPROVEMENT, UNDERWENT ST EVAL     # ELEVATED TROPONINS - NSTEMI - ? DEMAND - WILL NEED ISCHEMIC EVAL ONCE ACUTE INFECTION RESOLVES PER CARDIOLOGY, CARDIOLOGY CONSULT IN PROGRESS  - ON ASA, STATIN, BB    # OREN ON CKD - S/T ATN AND URINARY RETENTION - IMPROVING - S/P IVF, NOW W/ CASTAÑEDA, NEPHROLOGY CONUSLT IN PROGRESS  -  BPH ON FLOMAX, AND FINASTERIDE  - FAILED TOV WITH WORSENING RENAL FXN - NOTED URINARY RETENTION [10/4] - REPLACED CASTAÑEDA  - TOV 10/18 - BLADDER SCAN BID TO EVALUATE FOR URINARY RETENTION - FAILED TOV  - OVERNIGHT 10/18 - CASTAÑEDA REPLACED    # MEDICAL CLEARANCE FOR SURGERY - RCRI - 2 - 10.1 % 30 DAY RISK OF DEATH, MI OR CARDIAC ARREST  - CARDIOLOGY CONSULT     # DM -  HBA1C - 11  - LANTUS, SSI + FS    # CAD S/P CABG - PLACED ON ASA, STATIN, BB  - ECHO - SEVERE AORTIC STENOSIS, SEVERE CONCENTRIC LVH, G1DD, MILD UT  - CARDIOLOGY CONSULT - DR. BASSETT   - MAY NEED ISCHEMIC EVAL ONCE ACUTE ILLNESS RESOLVES INCLUDING CARDIAC CATHETERIZATION    # HTN, HLD  - ON METOPROLOL, NIFEDIPINE, STATIN     # HARD OF HEARING       #  IMPAIRED GAIT DUE TO CERVICAL & LS SPONDYLOSIS, POLY ARTHRITIS AND DIABETIC PERIPHERAL NEUROPATHY, OP VITAMIN D DEFICIENCY - ON CHOLECALCIFEROL, OBTAIN PT & OT   #  FAILURE TO THRIVE , MODERATE PROTEIN CALORIE MALNUTRITION       # CASE DISCUSSED AT LENGTH WITH PATIENT AND DAUGHTER, BIRDIE ROBERT VIA PHONE @ [phone] [10/25] - CASE DICUSSED AT LENGTH AND ALL QUESTIONS WERE ANSWERED. DAUGHTER UNDERSTOOD THAT PROGNOSIS IS GUARDED.  # PATIENT AND DAUGHTER REFUSED [facility] ON PREVIOUS ADMISSION, PREVIOUSLY WISHED FOR PATIENT RETURN HOME W/ HOME PT; HOWEVER NOW, DAUGHTER WISHES FOR PATIENT TO GO TO [facility], AS PATIENT'S WIFE IS CURRENTLY ADMITTED THERE    # GI AND DVT PPX

## 2021-11-01 NOTE — PROGRESS NOTE ADULT - ASSESSMENT
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  As per daughter, patient was taking tylenol which did not help his pain prompting the patient to ask his daughter to take him to the hospital. ON admission, he has no fever or Leukocytosis. The Xray of Right foot shows no Osteomyelitis but MRI of Right foot shows Lateral soft tissue wound with marrow signal abnormality throughout the fifth metatarsal which is consistent of Osteomyelitis. He has seen by Podiatry and wound culture has sent, Zosyn and Vancomycin has started. The ID consult requested to assist with further evaluation and antibiotic management.     # Right Fifth metatarsal DFU with drainage and Osteomyelitis- wound cx grew Enterococcus, Aeromonas and Klebsiella - Zosyn is the ideal to cover All organisms but kidney function is worsening, hence change to Unasyn and Levaquin until kidney function is improved - The pathology shows Bone with acute osteomyelitis and necrotic periosteal tissue.   # OREN- s/p urinary retention -s/p ibrahim catheter - s/p discontinue ACEI  # RLL pneumonia- most likely Aspiration, S/p Vomiting - On Unasyn  # Large bowel distension  # Candiduria- 9/22/21  # Pulmonary edema with bilateral moderate to large pleural effusions- on CT chest, 10/5/21- s/p intubation 10/7- s/p Right sided chest tube placement by CT team, 10/8/21  # COVID Exposure - PCR negative as of  10/11/21, 10/21/21 and re-exposed and in [place] until 11/1/21    would recommend:    1. Please encourage patient OOB to chair/PT   2. Monitor kidney function closely while on Zosyn   3. Aspiration precaution    4.. Wound care as per Podiatry  5. Continue Zosyn until 11/3/21  X 2 more days       Attending Attestation:    Spent more than 35 minutes on total encounter, more than 50 % of the visit was spent counseling and/or coordinating care by the Attending physician.

## 2021-11-01 NOTE — PROGRESS NOTE ADULT - ASSESSMENT
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 Of note, the patient never followed up with podiatry after his procedure in August. MRI confirms suspected osteomyelitis - patient followed by podiatry and ID, patient underwent debridement and wound VAC placement and removed in OR, was treated with Unasyn and Levofloxacin, ID. Surgical pathology resulted OM, wound culture resulting Enterococcus Aeromonal and Klebsiella- treated initially with Zosyn however hospital course complicated with OREN likely mixed etiology with prerenal from active infection and pulmonary edema, intrarenal due to toxicity from IV antibiotics and post renal from urinary retention, Nephrology consulted for optimization of kidney function. IV antibiotic regimen switched to Meropenem, sensitive for organism, will need 6 weeks of IV antibiotics via PICC, until 11/3. IR guided PICC placed to Left arm.  Patient subsequently treated for Pulmonary Edema with IV lasix, Cardiology recommending SABIHA with L/R heart cath once infection clears and medically stable. Hospital course further complicated by abdominal distention and constipation for which he received lactulose and vomited. The patient had subsequent respiratory distress and it is suspected he aspirated as CXR showed possible RLL PNA. AXR revealed distended loops of bowel for which NG tube was placed which the patient removed overnight 9/26 - re-attempts to replace NG tube were unsuccessful as patient was non-cooperative. Surgery consulted. CT abdomen initially deferred as patient could not lie flat however it was subsequently done and  revealed no  acute intra-abdominal pathology. Small bilateral pleural effusions with compressive atelectasis of both lower lobes. ICU consulted given patient's deterioration in clinical status. Patient was transferred to ICU, mechanically intubated and extubated for AHRF secondary to acute on CHF. Right pigtail placed for pleural effusion, and removed when resolved by Thoracic surgery. Patient stable and transferred to medicine floor. Patient however exposed to COVID, and being monitored for conversion. PT recommending STEVAN; plan for rehab pending auth.   Pt is exposed to COVID on 10/23, has been on quarantine until today 11/1/21. Repeat Covid PCR 11/1 is negative        COVID-19 PCR . (11.01.21 @ 05:19)    COVID-19 PCR: NotDetec: DONE BY [facility]  You can help in the fight against COVID-19. [employer] may contact  you to see if you are interested in voluntarily participating in one of  our clinical trials.  This test has been validated by [facility] [employer] to be accurate;  though it has not been FDA cleared/approved by the usual pathway  As with all laboratory test, results should be correlated with clinical  findings.  https://www.fda.gov/media/193016/download  https://www.fda.gov/media/041374/download

## 2021-11-01 NOTE — PROGRESS NOTE ADULT - SUBJECTIVE AND OBJECTIVE BOX
Patient is seen and examined at the bed side, is afebrile. The discharge plan noted.       REVIEW OF SYSTEMS: All other review systems are negative      ALLERGIES: No Known Allergies      Vital Signs Last 24 Hrs  T(C): 36.2 (01 Nov 2021 13:01), Max: 36.8 (31 Oct 2021 20:44)  T(F): 97.2 (01 Nov 2021 13:01), Max: 98.3 (31 Oct 2021 20:44)  HR: 69 (01 Nov 2021 13:01) (66 - 80)  BP: 145/69 (01 Nov 2021 13:01) (108/56 - 145/69)  BP(mean): --  RR: 18 (01 Nov 2021 13:01) (16 - 18)  SpO2: 100% (01 Nov 2021 13:01) (98% - 100%)        PHYSICAL EXAM:  GENERAL: Not in distress  CHEST/LUNG:  Not using accessory muscles, s/p removal of Right CT   HEART: s1 and s2 present  ABDOMEN:  Mild distended   EXTREMITIES: Right foot bandage in placed   CNS: Awake and alert       LABS:                         10.0   4.43  )-----------( 245      ( 01 Nov 2021 05:55 )             31.2                           10.0   4.88  )-----------( 276      ( 31 Oct 2021 06:26 )             31.1       11-01    141  |  109<H>  |  9   ----------------------------<  104<H>  3.6   |  24  |  1.15    Ca    9.1      01 Nov 2021 05:55  Phos  2.8     11-01  Mg     2.0     11-01      10-31    141  |  109<H>  |  9   ----------------------------<  109<H>  3.7   |  24  |  1.26    Ca    9.3      31 Oct 2021 06:26    TPro  7.1  /  Alb  2.6<L>  /  TBili  0.7  /  DBili  x   /  AST  27  /  ALT  34  /  AlkPhos  125<H>  10-25      09-25    139  |  107  |  41<H>  ----------------------------<  134<H>  4.1   |  21<L>  |  4.05<H>    Ca    8.6      25 Sep 2021 06:40    TPro  6.6  /  Alb  2.3<L>  /  TBili  0.5  /  DBili  x   /  AST  25  /  ALT  15  /  AlkPhos  102  09-25        CAPILLARY BLOOD GLUCOSE  POCT Blood Glucose.: 134 mg/dL (17 Sep 2021 17:11)  POCT Blood Glucose.: 128 mg/dL (17 Sep 2021 11:06)  POCT Blood Glucose.: 157 mg/dL (17 Sep 2021 04:10)      Vancomycin Level, Trough (10.14.21 @ 11:32) : 21.8  Vancomycin Level, Trough (10.12.21 @ 12:13) : 19.5:      MEDICATIONS  (STANDING):    apixaban 5 milliGRAM(s) Oral every 12 hours  aspirin  chewable 81 milliGRAM(s) Oral daily  atorvastatin 80 milliGRAM(s) Oral at bedtime  chlorhexidine 2% Cloths 1 Application(s) Topical <User Schedule>  collagenase Ointment 1 Application(s) Topical daily  cyanocobalamin 1000 MICROGram(s) Oral daily  dextrose 5% + sodium chloride 0.45%. 1000 milliLiter(s) (75 mL/Hr) IV Continuous <Continuous>  dextrose 5%. 1000 milliLiter(s) (100 mL/Hr) IV Continuous <Continuous>  ergocalciferol 99703 Unit(s) Oral <User Schedule>  ferrous    sulfate Liquid 300 milliGRAM(s) Enteral Tube daily  finasteride 5 milliGRAM(s) Oral daily  gabapentin 100 milliGRAM(s) Oral three times a day  glucagon  Injectable 1 milliGRAM(s) IntraMuscular once  insulin lispro (ADMELOG) corrective regimen sliding scale   SubCutaneous Before meals and at bedtime  metoprolol tartrate 25 milliGRAM(s) Oral two times a day  NIFEdipine XL 60 milliGRAM(s) Oral daily  pantoprazole    Tablet 40 milliGRAM(s) Oral before breakfast  piperacillin/tazobactam IVPB.. 3.375 Gram(s) IV Intermittent every 8 hours      RADIOLOGY & ADDITIONAL TESTS:    10/5/21 CT Chest No Cont (10.05.21 @ 14:07) Pulmonary edema with bilateral moderate to large pleural effusions. Underlying bilateral lower lobe compressive atelectasis versus pneumonia.  Mildly enlarged mediastinal lymph nodes, possibly reactive.      10/2/21: Xray Chest 1 View- PORTABLE-Routine (Xray Chest 1 View- PORTABLE-Routine in AM.) (10.02.21 @ 09:46) There is persistent bilateral perihilar/basilar diffuse airspace disease and/or RIGHT effusion.  Cardiomegaly.  No interval change.    Collected Date/Time: 9/22/2021 08:42 EDT   Received Date/Time: 9/23/2021 09:29 EDT   Surgical Pathology Report - Auth (Verified)   Specimen(s) Submitted   1 Right 5th Toe Wound Debridement r/o Osteomyelitis   Final Diagnosis   Right fifth toe; wound debridement:   - Bone with acute osteomyelitis and necrotic periosteal tissue.     9/28/21: US Abdomen Upper Quadrant Right (09.28.21 @ 12:13) Cholelithiasis without evidence of acute cholecystitis. Note is made of right pleural effusion    9/27/21: CT Abdomen and Pelvis w/ Oral Cont (09.27.21 @ 15:53) >No acute intra-abdominal pathology.    Small bilateral pleural effusions with compressive atelectasis of both lower lobes.    9/26/21: Xray Chest 1 View-PORTABLE IMMEDIATE (Xray Chest 1 View-PORTABLE IMMEDIATE .) (09.26.21 @ 15:28) : Small bilateral pleural effusions and mild pulmonary edema. A right lower lobe pneumonia is not excluded.      9/26/21: Xray Abdomen 1 View Portable, IMMEDIATE (Xray Abdomen 1 View Portable, IMMEDIATE .) (09.26.21 @ 15:28) : There is a nasogastric tube with its tip in the stomach. There is gaseous distention of the colon. No pathologic calcifications are seen. The osseous structures are intact with degenerative change is present in the spine.      9/17/21 : MR Foot No Cont, Right (09.17.21 @ 13:04) : Resection at the mid aspect of the fifth metatarsal. Lateral soft tissue wound with marrow signal abnormality throughout the fifth metatarsal and within the adjacent fourth metatarsal head which is nonspecific in the setting of recent surgery although osteomyelitis is suspected.    9/16/21 : Xray Foot AP + Lateral + Oblique, Right (09.16.21 @ 15:03) : No acute finding. No plain film evidence of osteomyelitis.        MICROBIOLOGY DATA:    COVID-19 PCR (10.11.21 @ 05:44)   COVID-19 PCR: NotDetec:   Urine Microscopic-Add On (NC) (09.22.21 @ 16:14)   Red Blood Cell - Urine: 0-2 /HPF   White Blood Cell - Urine: 3-5 /HPF   Bacteria: Moderate /HPF   Comment - Urine: yeast cells present   Epithelial Cells: Moderate /HPF     Culture - Tissue with Gram Stain (09.22.21 @ 13:54)   Gram Stain: No polymorphonuclear cells seen per low power field   No organisms seen per oil power field   Specimen Source: .Tissue Right 5th toe r/o osteomyeltis   Culture Results: No growth     COVID-19 David Domain Antibody (09.18.21 @ 12:55)   COVID-19 David Domain Antibody Result: >250.00:    Culture - Blood (09.17.21 @ 10:28)   Specimen Source: .Blood Blood-Peripheral   Culture Results: No growth to date.     Culture - Blood (09.17.21 @ 10:28)   Specimen Source: .Blood Blood-Venous   Culture Results: No growth to date.     Culture - Surgical Swab (09.16.21 @ 21:42)   - Amikacin: S <=16   - Amikacin: S <=16   - Amoxicillin/Clavulanic Acid: S <=8/4   - Ampicillin: R >16 These ampicillin results predict results for amoxicillin   - Ampicillin: S <=2 Predicts results to ampicillin/sulbactam, amoxacillin-clavulanate and piperacillin-tazobactam.   - Ampicillin/Sulbactam: S 8/4 Enterobacter, Citrobacter, and Serratia may develop resistance during prolonged therapy (3-4 days)   - Ampicillin/Sulbactam: R >16/8   - Aztreonam: S <=4   - Aztreonam: S <=4   - Cefazolin: R 16 Enterobacter, Citrobacter, and Serratia may develop resistance during prolonged therapy (3-4 days)   - Cefazolin: R >16   - Cefepime: S <=2   - Cefepime: S <=2   - Cefoxitin: S <=8   - Cefoxitin: S <=8   - Ceftazidime: S <=1   - Ceftriaxone: S <=1   - Ceftriaxone: S <=1 Enterobacter, Citrobacter, and Serratia may develop resistance during prolonged therapy   - Ciprofloxacin: S <=0.25   - Ciprofloxacin: S <=0.25   - Ertapenem: S <=0.5   - Gentamicin: S <=2   - Gentamicin: S <=2   - Imipenem: S <=1   - Levofloxacin: S <=0.5   - Levofloxacin: S <=0.5   - Meropenem: S <=1   - Meropenem: S <=1   - Piperacillin/Tazobactam: S <=8   - Piperacillin/Tazobactam: S <=8   - Tetra/Doxy: S 4   - Tobramycin: S <=2   - Trimethoprim/Sulfamethoxazole: S <=0.5/9.5   - Trimethoprim/Sulfamethoxazole: S <=0.5/9.5   - Vancomycin: S 2   Specimen Source: .Surgical Swab right foot wound   Culture Results:   Culture yields >4 types of aerobic and/or anaerobic bacteria   Call client services within 7 days if further workup is clinically   indicated. Culture includes   Rare Aeromonas hydrophila/caviae   Few Klebsiella oxytoca/Raoutella ornithinolytica   Few Enterococcus faecalis   Few Streptococcus agalactiae (Group B) isolated   Group B streptococci are susceptible to ampicillin,   penicillin and cefazolin, but may be resistant to   erythromycin and clindamycin.   Recommendations for intrapartum prophylaxis for Group B   streptococci are penicillin or ampicillin.   Organism Identification: Aeromonas hydrophila/caviae   Klebsiella oxytoca /Raoutella ornithinolytica   Enterococcus faecalis   Organism: Aeromonas hydrophila/caviae   Organism: Klebsiella oxytoca /Raoutella ornithinolytica   Organism: Enterococcus faecalis   COVID-19 PCR . (09.16.21 @ 22:24)   COVID-19 PCR: NotDetec:

## 2021-11-01 NOTE — PROGRESS NOTE ADULT - PROBLEM SELECTOR PLAN 11
Pt for STEVAN pending auth ( was not accepted at [facility] )  Care management following for discharge planning  Will need SABIHA and R&L heart Cath after infection clears; patient to follow up with Dr Franky watson

## 2021-11-01 NOTE — PROGRESS NOTE ADULT - SUBJECTIVE AND OBJECTIVE BOX
NP Note discussed with  Primary Attending; Dr Samaniego    Patient is a 76y old  Male who presents with a chief complaint of R Foot Pain (01 Nov 2021 14:57)      INTERVAL HPI/OVERNIGHT EVENTS: Patient seen and examined earlier this am no new complaints    MEDICATIONS  (STANDING):  apixaban 5 milliGRAM(s) Oral every 12 hours  aspirin  chewable 81 milliGRAM(s) Oral daily  atorvastatin 80 milliGRAM(s) Oral at bedtime  chlorhexidine 2% Cloths 1 Application(s) Topical <User Schedule>  collagenase Ointment 1 Application(s) Topical daily  cyanocobalamin 1000 MICROGram(s) Oral daily  dextrose 5% + sodium chloride 0.45%. 1000 milliLiter(s) (75 mL/Hr) IV Continuous <Continuous>  dextrose 5%. 1000 milliLiter(s) (100 mL/Hr) IV Continuous <Continuous>  ergocalciferol 28545 Unit(s) Oral <User Schedule>  ferrous    sulfate Liquid 300 milliGRAM(s) Enteral Tube daily  finasteride 5 milliGRAM(s) Oral daily  gabapentin 100 milliGRAM(s) Oral three times a day  glucagon  Injectable 1 milliGRAM(s) IntraMuscular once  insulin lispro (ADMELOG) corrective regimen sliding scale   SubCutaneous Before meals and at bedtime  metoprolol tartrate 25 milliGRAM(s) Oral two times a day  NIFEdipine XL 60 milliGRAM(s) Oral daily  pantoprazole    Tablet 40 milliGRAM(s) Oral before breakfast  piperacillin/tazobactam IVPB.. 3.375 Gram(s) IV Intermittent every 8 hours    MEDICATIONS  (PRN):  acetaminophen   Tablet .. 650 milliGRAM(s) Oral every 6 hours PRN Temp greater or equal to 38C (100.4F), Moderate Pain (4 - 6)  ondansetron Injectable 4 milliGRAM(s) IV Push three times a day PRN Nausea and/or Vomiting  sodium chloride 0.9% lock flush 10 milliLiter(s) IV Push every 1 hour PRN Pre/post blood products, medications, blood draw, and to maintain line patency      __________________________________________________  REVIEW OF SYSTEMS:    CONSTITUTIONAL: No fever,   EYES: no acute visual disturbances  NECK: No pain or stiffness  RESPIRATORY: No cough; No shortness of breath  CARDIOVASCULAR: No chest pain, no palpitations  GASTROINTESTINAL: No pain. No nausea or vomiting; No diarrhea   NEUROLOGICAL: No headache or numbness, no tremors  MUSCULOSKELETAL: No joint pain, no muscle pain  GENITOURINARY: no dysuria, no frequency, no hematuria   ALL OTHER  ROS negative        Vital Signs Last 24 Hrs  T(C): 36.2 (01 Nov 2021 13:01), Max: 36.8 (31 Oct 2021 20:44)  T(F): 97.2 (01 Nov 2021 13:01), Max: 98.3 (31 Oct 2021 20:44)  HR: 69 (01 Nov 2021 13:01) (69 - 80)  BP: 145/69 (01 Nov 2021 13:01) (110/67 - 145/69)  BP(mean): --  RR: 18 (01 Nov 2021 13:01) (16 - 18)  SpO2: 100% (01 Nov 2021 13:01) (98% - 100%)    ________________________________________________  PHYSICAL EXAM:  GENERAL: NAD  HEENT: Normocephalic;  atraumatic   CHEST/LUNG: Breathing nonlabored;  Clear to auscultation bilaterally   HEART: +S1 +S2  regular  ABDOMEN: Soft, Nontender, Nondistended; Bowel sounds present  EXTREMITIES: no cyanosis; no edema. left arm PICC in place w/ C/D/I dressing   SKIN: warm and dry; no rash   NERVOUS SYSTEM:  Awake and alert; Oriented  to place, person and time ; no new deficits    _________________________________________________  LABS:                        10.0   4.43  )-----------( 245      ( 01 Nov 2021 05:55 )             31.2     11-01    141  |  109<H>  |  9   ----------------------------<  104<H>  3.6   |  24  |  1.15    Ca    9.1      01 Nov 2021 05:55  Phos  2.8     11-01  Mg     2.0     11-01          CAPILLARY BLOOD GLUCOSE      POCT Blood Glucose.: 125 mg/dL (01 Nov 2021 16:54)  POCT Blood Glucose.: 119 mg/dL (01 Nov 2021 11:19)  POCT Blood Glucose.: 106 mg/dL (01 Nov 2021 07:33)  POCT Blood Glucose.: 99 mg/dL (01 Nov 2021 07:32)  POCT Blood Glucose.: 112 mg/dL (31 Oct 2021 21:12)        RADIOLOGY & ADDITIONAL TESTS:    < from: MR Foot No Cont, Right (09.17.21 @ 13:04) >    EXAM:  MR FOOT RT                            PROCEDURE DATE:  09/17/2021          INTERPRETATION:  Clinical Information: Recent fifth metatarsal head resection now with fifth digit pain, swelling and foul discharge.    Comparison: Radiographs of the right foot from 9/16/2021 and MRI the right foot from 8/16/2021.    Technique:  MRI of the right midfoot and forefoot.  Intravenous Contrast: None.    Findings:    There is a resection at the mid aspect of the fifth metatarsal shaft. There is a lateral soft tissue wound beginning at the level of the amputation which extends distally. There is susceptibility artifact in the region of the amputation consistent with postoperative change. There is hyperintense T2 marrow signal throughout the remaining fifth metatarsal and within the adjacent fourth metatarsal head which is nonspecific and could be related to recent postoperative changes although osteomyelitis is suspected.    There is edema and mild atrophy within the plantar muscles of the foot. There is minimal spurring at the first metatarsophalangeal and hallux sesamoid articulations.    Impression:  Resection at the mid aspect of the fifth metatarsal. Lateral soft tissue wound with marrow signal abnormality throughout the fifth metatarsal and within the adjacent fourth metatarsal head which is nonspecific in the setting of recent surgery although osteomyelitis is suspected.    --- End of Report ---            AMIE ORTIZ MD; Attending Radiologist  This document has been electronically signed. Sep 17 2021  1:49PM    < end of copied text >

## 2021-11-02 LAB
ANION GAP SERPL CALC-SCNC: 7 MMOL/L — SIGNIFICANT CHANGE UP (ref 5–17)
BUN SERPL-MCNC: 11 MG/DL — SIGNIFICANT CHANGE UP (ref 7–18)
CALCIUM SERPL-MCNC: 9.1 MG/DL — SIGNIFICANT CHANGE UP (ref 8.4–10.5)
CHLORIDE SERPL-SCNC: 109 MMOL/L — HIGH (ref 96–108)
CO2 SERPL-SCNC: 25 MMOL/L — SIGNIFICANT CHANGE UP (ref 22–31)
CREAT SERPL-MCNC: 1.18 MG/DL — SIGNIFICANT CHANGE UP (ref 0.5–1.3)
GLUCOSE BLDC GLUCOMTR-MCNC: 106 MG/DL — HIGH (ref 70–99)
GLUCOSE BLDC GLUCOMTR-MCNC: 114 MG/DL — HIGH (ref 70–99)
GLUCOSE BLDC GLUCOMTR-MCNC: 118 MG/DL — HIGH (ref 70–99)
GLUCOSE BLDC GLUCOMTR-MCNC: 179 MG/DL — HIGH (ref 70–99)
GLUCOSE SERPL-MCNC: 100 MG/DL — HIGH (ref 70–99)
HCT VFR BLD CALC: 31.7 % — LOW (ref 39–50)
HGB BLD-MCNC: 10.1 G/DL — LOW (ref 13–17)
MCHC RBC-ENTMCNC: 28.9 PG — SIGNIFICANT CHANGE UP (ref 27–34)
MCHC RBC-ENTMCNC: 31.9 GM/DL — LOW (ref 32–36)
MCV RBC AUTO: 90.8 FL — SIGNIFICANT CHANGE UP (ref 80–100)
NRBC # BLD: 0 /100 WBCS — SIGNIFICANT CHANGE UP (ref 0–0)
PLATELET # BLD AUTO: 247 K/UL — SIGNIFICANT CHANGE UP (ref 150–400)
POTASSIUM SERPL-MCNC: 3.7 MMOL/L — SIGNIFICANT CHANGE UP (ref 3.5–5.3)
POTASSIUM SERPL-SCNC: 3.7 MMOL/L — SIGNIFICANT CHANGE UP (ref 3.5–5.3)
RBC # BLD: 3.49 M/UL — LOW (ref 4.2–5.8)
RBC # FLD: 14.8 % — HIGH (ref 10.3–14.5)
SODIUM SERPL-SCNC: 141 MMOL/L — SIGNIFICANT CHANGE UP (ref 135–145)
WBC # BLD: 4.63 K/UL — SIGNIFICANT CHANGE UP (ref 3.8–10.5)
WBC # FLD AUTO: 4.63 K/UL — SIGNIFICANT CHANGE UP (ref 3.8–10.5)

## 2021-11-02 RX ADMIN — GABAPENTIN 100 MILLIGRAM(S): 400 CAPSULE ORAL at 06:26

## 2021-11-02 RX ADMIN — ATORVASTATIN CALCIUM 80 MILLIGRAM(S): 80 TABLET, FILM COATED ORAL at 21:47

## 2021-11-02 RX ADMIN — Medication 25 MILLIGRAM(S): at 06:21

## 2021-11-02 RX ADMIN — PANTOPRAZOLE SODIUM 40 MILLIGRAM(S): 20 TABLET, DELAYED RELEASE ORAL at 07:50

## 2021-11-02 RX ADMIN — PREGABALIN 1000 MICROGRAM(S): 225 CAPSULE ORAL at 12:20

## 2021-11-02 RX ADMIN — CHLORHEXIDINE GLUCONATE 1 APPLICATION(S): 213 SOLUTION TOPICAL at 06:21

## 2021-11-02 RX ADMIN — Medication 300 MILLIGRAM(S): at 12:20

## 2021-11-02 RX ADMIN — Medication 1: at 17:26

## 2021-11-02 RX ADMIN — APIXABAN 5 MILLIGRAM(S): 2.5 TABLET, FILM COATED ORAL at 17:25

## 2021-11-02 RX ADMIN — GABAPENTIN 100 MILLIGRAM(S): 400 CAPSULE ORAL at 21:47

## 2021-11-02 RX ADMIN — APIXABAN 5 MILLIGRAM(S): 2.5 TABLET, FILM COATED ORAL at 06:21

## 2021-11-02 RX ADMIN — Medication 1 APPLICATION(S): at 12:20

## 2021-11-02 RX ADMIN — FINASTERIDE 5 MILLIGRAM(S): 5 TABLET, FILM COATED ORAL at 12:20

## 2021-11-02 RX ADMIN — PIPERACILLIN AND TAZOBACTAM 25 GRAM(S): 4; .5 INJECTION, POWDER, LYOPHILIZED, FOR SOLUTION INTRAVENOUS at 21:47

## 2021-11-02 RX ADMIN — Medication 650 MILLIGRAM(S): at 07:31

## 2021-11-02 RX ADMIN — Medication 650 MILLIGRAM(S): at 13:19

## 2021-11-02 RX ADMIN — Medication 81 MILLIGRAM(S): at 12:20

## 2021-11-02 RX ADMIN — Medication 650 MILLIGRAM(S): at 14:19

## 2021-11-02 RX ADMIN — GABAPENTIN 100 MILLIGRAM(S): 400 CAPSULE ORAL at 13:14

## 2021-11-02 RX ADMIN — Medication 650 MILLIGRAM(S): at 06:31

## 2021-11-02 RX ADMIN — Medication 60 MILLIGRAM(S): at 06:21

## 2021-11-02 RX ADMIN — PIPERACILLIN AND TAZOBACTAM 25 GRAM(S): 4; .5 INJECTION, POWDER, LYOPHILIZED, FOR SOLUTION INTRAVENOUS at 06:21

## 2021-11-02 RX ADMIN — PIPERACILLIN AND TAZOBACTAM 25 GRAM(S): 4; .5 INJECTION, POWDER, LYOPHILIZED, FOR SOLUTION INTRAVENOUS at 13:14

## 2021-11-02 NOTE — PROGRESS NOTE ADULT - ASSESSMENT
A:   s/p R 5th ray resection - 8/19  s/p R 5th wound debridement, graft application, bone biopsy and wound vac application 9/22       P:  Pt seen and evaluated   Right foot wound evaluated - no signs of infection noted today   Cultures were taken again today   Applied adaptic to the right dorsal wound  Applied adaptic and santyl to the lateral aspect of the right foot, DSD/ace   Continue abx per ID recommendation  Continue offloading lower extremities in CAIR boots   Pod plan: Pt stable from podiatry standpoint  Continue with LWC  Upon DC,   WCO: Apply santyl to the lateral wound of the right foot, apply adaptic, 4x4 gauze and dress with rebecca and ACE bandage   Recommend pt to f/u outpatient in clinic [id number]. Please call [phone] to make an appt   Discussed and evaluated at bedside attending Dr. Lara

## 2021-11-02 NOTE — PROGRESS NOTE ADULT - ASSESSMENT
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  As per daughter, patient was taking tylenol which did not help his pain prompting the patient to ask his daughter to take him to the hospital. ON admission, he has no fever or Leukocytosis. The Xray of Right foot shows no Osteomyelitis but MRI of Right foot shows Lateral soft tissue wound with marrow signal abnormality throughout the fifth metatarsal which is consistent of Osteomyelitis. He has seen by Podiatry and wound culture has sent, Zosyn and Vancomycin has started. The ID consult requested to assist with further evaluation and antibiotic management.     # Right Fifth metatarsal DFU with drainage and Osteomyelitis- wound cx grew Enterococcus, Aeromonas and Klebsiella - Zosyn is the ideal to cover All organisms but kidney function is worsening, hence change to Unasyn and Levaquin until kidney function is improved - The pathology shows Bone with acute osteomyelitis and necrotic periosteal tissue.   # OREN- s/p urinary retention -s/p ibrahim catheter - s/p discontinue ACEI  # RLL pneumonia- most likely Aspiration, S/p Vomiting - On Unasyn  # Large bowel distension  # Candiduria- 9/22/21  # Pulmonary edema with bilateral moderate to large pleural effusions- on CT chest, 10/5/21- s/p intubation 10/7- s/p Right sided chest tube placement by CT team, 10/8/21  # COVID Exposure - PCR negative as of  10/11/21, 10/21/21 and re-exposed and in [place] until 11/1/21    would recommend:    1. OOB to chair/PT   2. Monitor kidney function closely while on Zosyn   3. Aspiration precaution    4.. Wound care as per Podiatry  5. Continue Zosyn until 11/3/21  X 1 more day    d/w  nursing staff     Attending Attestation:    Spent more than 35 minutes on total encounter, more than 50 % of the visit was spent counseling and/or coordinating care by the Attending physician.

## 2021-11-02 NOTE — PROGRESS NOTE ADULT - SUBJECTIVE AND OBJECTIVE BOX
Patient is a 76y old  Male who presents with a chief complaint of R Foot Pain (02 Nov 2021 14:12)    PATIENT IS SEEN AND EXAMINED IN MEDICAL FLOOR.  NGT [    ]    MADDY [   ]      GT [   ]    ALLERGIES:  No Known Allergies      Daily     Daily     VITALS:    Vital Signs Last 24 Hrs  T(C): 36.5 (02 Nov 2021 14:03), Max: 36.8 (01 Nov 2021 20:56)  T(F): 97.7 (02 Nov 2021 14:03), Max: 98.2 (01 Nov 2021 20:56)  HR: 71 (02 Nov 2021 14:03) (66 - 71)  BP: 135/65 (02 Nov 2021 14:03) (125/53 - 138/66)  BP(mean): --  RR: 17 (02 Nov 2021 14:03) (17 - 18)  SpO2: 100% (02 Nov 2021 14:03) (100% - 100%)    LABS:    CBC Full  -  ( 02 Nov 2021 07:12 )  WBC Count : 4.63 K/uL  RBC Count : 3.49 M/uL  Hemoglobin : 10.1 g/dL  Hematocrit : 31.7 %  Platelet Count - Automated : 247 K/uL  Mean Cell Volume : 90.8 fl  Mean Cell Hemoglobin : 28.9 pg  Mean Cell Hemoglobin Concentration : 31.9 gm/dL  Auto Neutrophil # : x  Auto Lymphocyte # : x  Auto Monocyte # : x  Auto Eosinophil # : x  Auto Basophil # : x  Auto Neutrophil % : x  Auto Lymphocyte % : x  Auto Monocyte % : x  Auto Eosinophil % : x  Auto Basophil % : x      11-02    141  |  109<H>  |  11  ----------------------------<  100<H>  3.7   |  25  |  1.18    Ca    9.1      02 Nov 2021 07:12  Phos  2.8     11-01  Mg     2.0     11-01      CAPILLARY BLOOD GLUCOSE      POCT Blood Glucose.: 118 mg/dL (02 Nov 2021 11:54)  POCT Blood Glucose.: 106 mg/dL (02 Nov 2021 07:42)  POCT Blood Glucose.: 112 mg/dL (01 Nov 2021 21:17)  POCT Blood Glucose.: 125 mg/dL (01 Nov 2021 16:54)          Creatinine Trend: 1.18<--, 1.15<--, 1.26<--, 1.19<--, 1.25<--, 1.17<--  I&O's Summary    01 Nov 2021 07:01  -  02 Nov 2021 07:00  --------------------------------------------------------  IN: 0 mL / OUT: 650 mL / NET: -650 mL            .Body Fluid Pleural Fluid  10-08 @ 18:51   No growth at 1 week.  --  --      .Body Fluid Pleural Fluid  10-08 @ 18:34   No growth at 5 days  --    polymorphonuclear leukocytes seen  No organisms seen  by cytocentrifuge      .Tissue Right 5th toe r/o osteomyeltis  09-22 @ 13:54   No growth at 5 days  --    No polymorphonuclear cells seen per low power field  No organisms seen per oil power field      .Blood Blood-Venous  09-17 @ 10:28   No Growth Final  --  --      .Surgical Swab right foot wound  09-16 @ 21:42   Culture yields >4 types of aerobic and/or anaerobic bacteria  Call client services within 7 days if further workup is clinically  indicated. Culture includes  Rare Aeromonas hydrophila/caviae  Few Klebsiella oxytoca/Raoutella ornithinolytica  Few Enterococcus faecalis  Few Streptococcus agalactiae (Group B) isolated  Group B streptococci are susceptible to ampicillin,  penicillin and cefazolin, but may be resistant to  erythromycin and clindamycin.  Recommendations for intrapartum prophylaxis for Group B  streptococci are penicillin or ampicillin.  --  Aeromonas hydrophila/caviae  Klebsiella oxytoca /Raoutella ornithinolytica  Enterococcus faecalis      Bone R 5th metatarsal bone clean ma  08-20 @ 03:59   No growth at 5 days  --    No polymorphonuclear leukocytes seen per low power field  No organisms seen per oil power field      .Blood Blood-Venous  08-14 @ 21:09   No Growth Final  --  --      .Surgical Swab Right foot plantar wound  08-14 @ 21:08   Moderate Streptococcus agalactiae (Group B) isolated  Group B streptococci are susceptible to ampicillin,  penicillin and cefazolin, but may be resistant to  erythromycin and clindamycin.  Recommendations for intrapartum prophylaxis for Group B  streptococci are penicillin or ampicillin.  Moderate Bacteroides fragilis "Susceptibilities not performed"  Normal skin filiberto isolated  --  --          MEDICATIONS:    MEDICATIONS  (STANDING):  apixaban 5 milliGRAM(s) Oral every 12 hours  aspirin  chewable 81 milliGRAM(s) Oral daily  atorvastatin 80 milliGRAM(s) Oral at bedtime  chlorhexidine 2% Cloths 1 Application(s) Topical <User Schedule>  collagenase Ointment 1 Application(s) Topical daily  cyanocobalamin 1000 MICROGram(s) Oral daily  dextrose 5% + sodium chloride 0.45%. 1000 milliLiter(s) (75 mL/Hr) IV Continuous <Continuous>  dextrose 5%. 1000 milliLiter(s) (100 mL/Hr) IV Continuous <Continuous>  ergocalciferol 82996 Unit(s) Oral <User Schedule>  ferrous    sulfate Liquid 300 milliGRAM(s) Enteral Tube daily  finasteride 5 milliGRAM(s) Oral daily  gabapentin 100 milliGRAM(s) Oral three times a day  glucagon  Injectable 1 milliGRAM(s) IntraMuscular once  insulin lispro (ADMELOG) corrective regimen sliding scale   SubCutaneous Before meals and at bedtime  metoprolol tartrate 25 milliGRAM(s) Oral two times a day  NIFEdipine XL 60 milliGRAM(s) Oral daily  pantoprazole    Tablet 40 milliGRAM(s) Oral before breakfast  piperacillin/tazobactam IVPB.. 3.375 Gram(s) IV Intermittent every 8 hours      MEDICATIONS  (PRN):  acetaminophen   Tablet .. 650 milliGRAM(s) Oral every 6 hours PRN Temp greater or equal to 38C (100.4F), Moderate Pain (4 - 6)  ondansetron Injectable 4 milliGRAM(s) IV Push three times a day PRN Nausea and/or Vomiting  sodium chloride 0.9% lock flush 10 milliLiter(s) IV Push every 1 hour PRN Pre/post blood products, medications, blood draw, and to maintain line patency      REVIEW OF SYSTEMS:                           ALL ROS DONE [ X   ]    CONSTITUTIONAL:  LETHARGIC [   ], FEVER [   ], UNRESPONSIVE [   ]  CVS:  CP  [   ], SOB, [   ], PALPITATIONS [   ], DIZZYNESS [   ]  RS: COUGH [   ], SPUTUM [   ]  GI: ABDOMINAL PAIN [   ], NAUSEA [   ], VOMITINGS [   ], DIARRHEA [   ], CONSTIPATION [   ]  :  DYSURIA [   ], NOCTURIA [   ], INCREASED FREQUENCY [   ], DRIBLING [   ],  SKELETAL: PAINFUL JOINTS [   ], SWOLLEN JOINTS [   ], NECK ACHE [   ], LOW BACK ACHE [   ],  SKIN : ULCERS [   ], RASH [   ], ITCHING [   ]  CNS: HEAD ACHE [   ], DOUBLE VISION [   ], BLURRED VISION [   ], AMS / CONFUSION [   ], SEIZURES [   ], WEAKNESS [   ],TINGLING / NUMBNESS [   ]      PHYSICAL EXAMINATION:    GENERAL APPEARANCE: NO DISTRESS  HEENT:  NO PALLOR, NO  JVD,  NO   NODES, NECK SUPPLE  CVS: S1 +, S2 +,   RS: AEEB,  OCCASIONAL  RALES +,  RONCHI + [IMPROVING]  ABD: SOFT, NT, NO, BS +       EXT: NO PE  SKIN: WARM,   RIGHT FOOT WRAPPED  SKELETAL:  ROM ACCEPTABLE  CNS:  AAO X  2-3      RADIOLOGY :    < from: Transthoracic Echocardiogram (10.07.21 @ 15:26) >  CONCLUSIONS:  1. Normal mitral valve. Moderate mitral regurgitation.  2. Calcified aortic valve with decreased opening, cannot  exclude bicuspid. Peak transaortic valve gradient equals  32.4 mm Hg, mean transaortic valve gradient equals 19 mm  Hg, dimensionless index 0.22, estimated aortic valve area  equals 0.6sqcm (by continuity equation), consistent with  paradoxical low-flow low-gradient severe aortic stenosis.  Consider dobutamine stress echocardiogram and/or SABIHA for  further evaluation.  No aortic valve regurgitation seen.  3. Normal aortic root.  4. Normal left atrium.  5. Moderate concentric left ventricular hypertrophy.  6. Moderate global left ventricular systolic dysfunction  (EF 40-45% by visual estimation).  7. Grade II diastolic dysfunction (moderate).  8. Normal right atrium.  9. Normal right ventricular size with decreased RV systolic  function (TAPSE 1.2 cm).  10. RV systolic pressure is borderline normal at  34 mm Hg.  11. Tricuspid valve not well seen. Trace tricuspid  regurgitation.  12. Normal pulmonic valve. Trace pulmonic insufficiency is  noted.  13. No pericardial effusion.  14. Bilateral pleural effusions.    < end of copied text >      EXAM:  CT ABDOMEN AND PELVIS OC                            PROCEDURE DATE:  09/27/2021          INTERPRETATION:  CLINICAL INFORMATION: Small bowel obstruction.    COMPARISON: None.    CONTRAST/COMPLICATIONS:  IV Contrast: NONE  Oral Contrast: Omnipaque 300  Complications: None reported at time of study completion    PROCEDURE:  CT of the Abdomen and Pelvis was performed.  Sagittal and coronal reformats were performed.    FINDINGS:  LOWER CHEST: Small bilateral pleural effusions with compressiveatelectasis of both lower lobes.    LIVER: Within normal limits.  BILE DUCTS: Normal caliber.  GALLBLADDER: Distended. Small layering gallstones.  SPLEEN: Within normal limits.  PANCREAS: Within normal limits.  ADRENALS: Within normal limits.  KIDNEYS/URETERS: No hydronephrosis. Nonobstructing left upper pole calculus, measuring 0.6 cm.    BLADDER: Urinary bladder contains air and a Castañeda catheter balloon.  REPRODUCTIVE ORGANS: Prostate is not enlarged.    BOWEL: No bowel obstruction. Appendix isnormal. Colonic diverticulosis.  PERITONEUM: Mild presacral fluid.  VESSELS: Atherosclerotic changes.  RETROPERITONEUM/LYMPH NODES: No lymphadenopathy.  ABDOMINAL WALL: Within normal limits.  BONES: Degenerative changes. Sternotomy.    IMPRESSION:  No acute intra-abdominal pathology.    Small bilateral pleural effusions with compressive atelectasis of both lower lobes.    ====================================================    EXAM:  MR FOOT RT                            PROCEDURE DATE:  09/17/2021          INTERPRETATION:  Clinical Information: Recent fifth metatarsal head resection now with fifth digit pain, swelling and foul discharge.    Comparison: Radiographs of the right foot from 9/16/2021 and MRI the right foot from 8/16/2021.    Technique:  MRI of the right midfoot and forefoot.  Intravenous Contrast: None.    Findings:    There is a resection at the mid aspect of the fifth metatarsal shaft. There is a lateral soft tissue wound beginning at the level of the amputation which extends distally. There is susceptibility artifact in the region of the amputation consistent with postoperative change. There is hyperintense T2 marrow signal throughout the remaining fifth metatarsal and within the adjacent fourth metatarsal head which is nonspecific and could be related to recent postoperative changes although osteomyelitis is suspected.    There is edema and mild atrophy within the plantar muscles of the foot. There is minimal spurring at the first metatarsophalangeal and hallux sesamoid articulations.    Impression:  Resection at the mid aspect of the fifth metatarsal. Lateral soft tissue wound with marrow signal abnormality throughout the fifth metatarsal and within the adjacent fourth metatarsal head which is nonspecific in the setting of recent surgery although osteomyelitis is suspected.        ASSESSMENT :     Headache    HTN (hypertension)    HLD (hyperlipidemia)    DM (diabetes mellitus)    CAD (coronary artery disease)    Glaucoma, angle-closure    [place] (hard of hearing)      S/P CABG (coronary artery bypass graft)    History of thyroid surgery        PLAN:    HPI:  76M from home, lives with daughter w/ PMH DM on insulin, HTN, HLD, CAD, 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 Pt with sharp pain on the R lateral foot. As per daughter, pt was taking tylenol which did not help his pain prompting the patient to ask his daughter to take him to the hospital. Pt is a poor historian. Daughter states that the patient did not see his podiatrist after the surgery. No fever, chest, pain, palpitations, nausea, vomiting, diarrhea (17 Sep 2021 01:11)      # REPEAT NASAL SWAB FOR COVID 19 IS NEGATIVE - D/C PLAN TO [facility] FOR STEVAN. PATIENT IS MEDICALLY CLEARED FOR D/C AWAITING STEVAN APPROVAL. CM TEAM WORKING CLOSELY WITH FAMILY AND SUPERVISORS TO COORDINATE THIS.     # CASE D/W PATIENT CARE TEAM CHERELLE DIAL TO HELP ASSIST WITH PATIENT AND FAMILY'S NEEDS AT THIS TIME     # PATIENT IS S/P ICU MONITORING AND RIGHT CHEST TUBE REMOVAL ON 10/15/2021     # COVID EXPOSURE ON 10/13 AND ONCE MORE HAD RE-EXPOSURE ON 10/22 - INFECTION CONTROL IS AWARE AND ADDRESSING - ON CONTACT AND DROPLET ISOLATION PRECAUTION; F/U COVID PCR ON 10/30 AND 11/1 - IF NEGATIVE D/C ISOLATION    # RIGHT FOOT OSTEOMYELITIS S/P RIGHT 5TH RAY RESECTION [ 8/19 ]  AND S/P DEBRIDEMENT, GRAFT APPLICATION, BONE BX AND WOUND VAC APPLICATION 9/22 - BONE BX W/ ACUTE OSTEOMYELITIS AND NECROTIC PERIOSTEAL TISSUE  ** RECENT ADMISSION FOR RIGHT DIABETIC FOOT ULCER, CELLULITIS, OSTEOMYELITIS RIGHT 5th METATARSAL S/P RIGHT 5TH PARTIAL RAY AMPUTATION [8/20] - BONE PATHOLOGY W/ CLEAN MARGINS    - S/P VANCOMYCIN AND ZOSYN ; SWITCHED TO UNASYN AND LEVAQUIN GIVEN OREN; PER ID SWITCH TO ZOSYN UNTIL 11/3   - RECENTLY HAD SIMON W/ MILD PAD  - REVIEWED WOUND CX [ ENTEROCOCCUS, AEROMONAS, KLEBSIELLA] AND BCX  - ID CONSULT, PODIATRY CONSULT    - PICC LINE PLACED TO COMPLETE 6 WEEKS OF ANTIBIOTICS - ON ZOSYN UNTIL 11/3 ON D/C    # ACUTE HYPOXIC RESPIRATORY FAILURE DUE TO ACUTE ON CHRONIC SYSTOLIC CHF  (  LV EF 40- 45 % ) , DIASTOLIC LV DYSFUNCTION , ,  SEVERE AORTIC STENOSIS & MODERATE MITRAL REGURGITATION  &  ASPIRATION PNA;  - S/P CHEST TUBE INSERTION AND REMOVAL  , S/P LASIX ,   CARDIOLOGY CONSULT IN PROGRESS      - CHEST TUBE PLACED [10/8] - FLUID CONSISTENT WITH TRANSUDATIVE PROCESS  - S/P EXTUBATION 10/13  - S/P CHEST TUBE REMOVAL 10/15      # SEPTIC SHOCK - ? S/T HYPOVOLEMIA VS. SEPSIS - S/P CVC [SWITCHED FROM FEMORAL TO LEFT IJ], S/P VASOPRESSORS - RESOLVING  - BCX - NGTD  - ON MEROPENEM      # TRANSIENT NEW ONSET A.FIB, PAROXYSMAL - ON ELIQUIS, CARDIOLOGY CONSULT IN PROGRESS    # FUNGURIA - D/C FLUCONAZOLE GIVEN TRANSAMINITIS    # TRANSAMINITIS - SUSPECT THIS IS ISCHEMIC HEPATITIS - IMPROVING - HEPATOLOGY CONSULT IN PROGRESS    # BOWEL DISTENTION - ? ILEUS - OPTIMIZING ELECTROLYTES - IMPROVED  - SURGERY CONSULT IN PROGRESS    # CHOLELITHIASIS - TRENDING LFTS, RUQ U/S - CHOLELITHIASIS, S/P SURGERY CONSULT   - S/P GI CONSULT - LESS SUSPICIOUS FOR CHOLECYSTITIS    # DYSPEPSIA - PLACED ON PPI BID WITH IMPROVEMENT, UNDERWENT ST EVAL     # ELEVATED TROPONINS - NSTEMI - ? DEMAND - WILL NEED ISCHEMIC EVAL ONCE ACUTE INFECTION RESOLVES PER CARDIOLOGY, CARDIOLOGY CONSULT IN PROGRESS  - ON ASA, STATIN, BB    # OREN ON CKD - S/T ATN AND URINARY RETENTION - IMPROVING - S/P IVF, NOW W/ CASTAÑEDA, NEPHROLOGY CONUSLT IN PROGRESS  -  BPH ON FLOMAX, AND FINASTERIDE  - FAILED TOV WITH WORSENING RENAL FXN - NOTED URINARY RETENTION [10/4] - REPLACED CASTAÑEDA  - TOV 10/18 - BLADDER SCAN BID TO EVALUATE FOR URINARY RETENTION - FAILED TOV  - OVERNIGHT 10/18 - CASTAÑEDA REPLACED    # MEDICAL CLEARANCE FOR SURGERY - RCRI - 2 - 10.1 % 30 DAY RISK OF DEATH, MI OR CARDIAC ARREST  - CARDIOLOGY CONSULT     # DM -  HBA1C - 11  - LANTUS, SSI + FS    # CAD S/P CABG - PLACED ON ASA, STATIN, BB  - ECHO - SEVERE AORTIC STENOSIS, SEVERE CONCENTRIC LVH, G1DD, MILD OR  - CARDIOLOGY CONSULT - DR. BASSETT   - MAY NEED ISCHEMIC EVAL ONCE ACUTE ILLNESS RESOLVES INCLUDING CARDIAC CATHETERIZATION    # HTN, HLD  - ON METOPROLOL, NIFEDIPINE, STATIN     # HARD OF HEARING       #  IMPAIRED GAIT DUE TO CERVICAL & LS SPONDYLOSIS, POLY ARTHRITIS AND DIABETIC PERIPHERAL NEUROPATHY, OP VITAMIN D DEFICIENCY - ON CHOLECALCIFEROL, OBTAIN PT & OT   #  FAILURE TO THRIVE , MODERATE PROTEIN CALORIE MALNUTRITION       # CASE DISCUSSED AT LENGTH WITH PATIENT AND DAUGHTER, BIRDIE ROBERT VIA PHONE @ [phone] [10/25] - CASE DICUSSED AT LENGTH AND ALL QUESTIONS WERE ANSWERED. DAUGHTER UNDERSTOOD THAT PROGNOSIS IS GUARDED.  # PATIENT AND DAUGHTER REFUSED [facility] ON PREVIOUS ADMISSION, PREVIOUSLY WISHED FOR PATIENT RETURN HOME W/ HOME PT; HOWEVER NOW, DAUGHTER WISHES FOR PATIENT TO GO TO [facility], AS PATIENT'S WIFE IS CURRENTLY ADMITTED THERE    # GI AND DVT PPX

## 2021-11-02 NOTE — PROGRESS NOTE ADULT - PROBLEM SELECTOR PROBLEM 11
Discharge planning issues
Discharge planning issues
Prophylactic measure
Discharge planning issues
Advance care planning
Discharge planning issues

## 2021-11-02 NOTE — PROGRESS NOTE ADULT - PROBLEM SELECTOR PLAN 6
Controlled ( HA1c 6.6, Pt takes Lantus at home)  Continue sliding scale insulin coverage for now  blood glucose levels currently within acceptable levels   Continue glucose monitoring  Continue consistent carb diet

## 2021-11-02 NOTE — ED PROVIDER NOTE - NS ED MD EM SELECTION
Several attempts have been made to schedule patient for f/u with [facility]. Appointments have either been cancelled or patient has no showed. Can order for [facility] be discontinued until patient calls? [id number] Comprehensive

## 2021-11-02 NOTE — PROGRESS NOTE ADULT - ASSESSMENT
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 Of note, the patient never followed up with podiatry after his procedure in August. MRI confirms suspected osteomyelitis - patient followed by podiatry and ID, patient underwent debridement and wound VAC placement and removed in OR, was treated with Unasyn and Levofloxacin, ID. Surgical pathology resulted OM, wound culture resulting Enterococcus Aeromonal and Klebsiella- treated initially with Zosyn however hospital course complicated with OREN likely mixed etiology with prerenal from active infection and pulmonary edema, intrarenal due to toxicity from IV antibiotics and post renal from urinary retention, Nephrology consulted for optimization of kidney function. IV antibiotic regimen switched to Meropenem, sensitive for organism, will need 6 weeks of IV antibiotics via PICC, until 11/3. IR guided PICC placed to Left arm.  Patient subsequently treated for Pulmonary Edema with IV lasix, Cardiology recommending SABIHA with L/R heart cath once infection clears and medically stable. Hospital course further complicated by abdominal distention and constipation for which he received lactulose and vomited. The patient had subsequent respiratory distress and it is suspected he aspirated as CXR showed possible RLL PNA. AXR revealed distended loops of bowel for which NG tube was placed which the patient removed overnight 9/26 - re-attempts to replace NG tube were unsuccessful as patient was non-cooperative. Surgery consulted. CT abdomen initially deferred as patient could not lie flat however it was subsequently done and  revealed no  acute intra-abdominal pathology. Small bilateral pleural effusions with compressive atelectasis of both lower lobes. ICU consulted given patient's deterioration in clinical status. Patient was transferred to ICU, mechanically intubated and extubated for AHRF secondary to acute on CHF. Right pigtail placed for pleural effusion, and removed when resolved by Thoracic surgery. Patient stable and transferred to medicine floor. Patient however exposed to COVID, and being monitored for conversion. PT recommending STEVAN; plan for rehab pending auth.   Pt is exposed to COVID on 10/23, has been on quarantine until today 11/1/21. Repeat Covid PCR 11/1 is negative        COVID-19 PCR . (11.01.21 @ 05:19)    COVID-19 PCR: [employer]: DONE BY [employer]  You can help in the fight against COVID-19. [email or web address] may contact  you to see if you are interested in voluntarily participating in one of  our clinical trials.  This test has been validated by [employer] to be accurate;  though it has not been FDA cleared/approved by the usual pathway  As with all laboratory test, results should be correlated with clinical  findings.  https://www.fda.gov/media/715148/download  https://www.fda.gov/media/251724/download

## 2021-11-02 NOTE — PROGRESS NOTE ADULT - PROBLEM SELECTOR PLAN 11
Pt for STEVAN pending auth ( was not accepted at [facility] )  Care management following for discharge planning  Will need SABIHA and R&L heart Cath after infection clears; patient to follow up with Dr Franky Forbes until 11/3,ID following   Has PICC line

## 2021-11-02 NOTE — PROGRESS NOTE ADULT - SUBJECTIVE AND OBJECTIVE BOX
C A R D I O L O G Y  **********************************    DATE OF SERVICE: 11-02-21    Patient denies chest pain or shortness of breath.   Review of symptoms otherwise negative.    acetaminophen   Tablet .. 650 milliGRAM(s) Oral every 6 hours PRN  apixaban 5 milliGRAM(s) Oral every 12 hours  aspirin  chewable 81 milliGRAM(s) Oral daily  atorvastatin 80 milliGRAM(s) Oral at bedtime  chlorhexidine 2% Cloths 1 Application(s) Topical <User Schedule>  collagenase Ointment 1 Application(s) Topical daily  cyanocobalamin 1000 MICROGram(s) Oral daily  dextrose 5% + sodium chloride 0.45%. 1000 milliLiter(s) IV Continuous <Continuous>  dextrose 5%. 1000 milliLiter(s) IV Continuous <Continuous>  ergocalciferol 24704 Unit(s) Oral <User Schedule>  ferrous    sulfate Liquid 300 milliGRAM(s) Enteral Tube daily  finasteride 5 milliGRAM(s) Oral daily  gabapentin 100 milliGRAM(s) Oral three times a day  glucagon  Injectable 1 milliGRAM(s) IntraMuscular once  insulin lispro (ADMELOG) corrective regimen sliding scale   SubCutaneous Before meals and at bedtime  metoprolol tartrate 25 milliGRAM(s) Oral two times a day  NIFEdipine XL 60 milliGRAM(s) Oral daily  ondansetron Injectable 4 milliGRAM(s) IV Push three times a day PRN  pantoprazole    Tablet 40 milliGRAM(s) Oral before breakfast  piperacillin/tazobactam IVPB.. 3.375 Gram(s) IV Intermittent every 8 hours  sodium chloride 0.9% lock flush 10 milliLiter(s) IV Push every 1 hour PRN                            10.1   4.63  )-----------( 247      ( 02 Nov 2021 07:12 )             31.7       Hemoglobin: 10.1 g/dL (11-02 @ 07:12)  Hemoglobin: 10.0 g/dL (11-01 @ 05:55)  Hemoglobin: 10.0 g/dL (10-31 @ 06:26)  Hemoglobin: 9.9 g/dL (10-29 @ 06:09)      11-02    141  |  109<H>  |  11  ----------------------------<  100<H>  3.7   |  25  |  1.18    Ca    9.1      02 Nov 2021 07:12  Phos  2.8     11-01  Mg     2.0     11-01      Creatinine Trend: 1.18<--, 1.15<--, 1.26<--, 1.19<--, 1.25<--, 1.17<--    COAGS:           T(C): 36.8 (11-02-21 @ 04:35), Max: 36.8 (11-01-21 @ 20:56)  HR: 66 (11-02-21 @ 04:35) (66 - 69)  BP: 138/66 (11-02-21 @ 04:35) (125/53 - 145/69)  RR: 18 (11-02-21 @ 04:35) (18 - 18)  SpO2: 100% (11-02-21 @ 04:35) (100% - 100%)  Wt(kg): --    I&O's Summary    01 Nov 2021 07:01  -  02 Nov 2021 07:00  --------------------------------------------------------  IN: 0 mL / OUT: 650 mL / NET: -650 mL        HEENT:  (-)icterus (-)pallor  CV: N S1 S2 1/6 [place] (+)2 Pulses B/l  Resp:  Coarse sounds B/L, normal effort  GI: (+) BS Soft, NT, ND  Lymph:  (-)Edema, (-)obvious lymphadenopathy  Skin: Warm to touch, Normal turgor  Psych:  appropriate mood and affect        ASSESSMENT/PLAN: 	76y  Male PMH DM on insulin, HTN, HLD, normal lV fx moderate to severe AS 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    - crt improved  -  complete course of Abx per ID   - Echo noted, Severe AS, EF 40-45%   - He will need a SABIHA and R+L heart cath when he recovers and has no active infection  - Cont medical management of CAD  - Pt. with new onset PAF now on Eliquis  - No need for further inpatient cardiac work up.       Zak Wilkins MD, [facility]  BEEPER [phone]

## 2021-11-02 NOTE — PROGRESS NOTE ADULT - PROBLEM SELECTOR PLAN 2
Resolved  Monitored in ICU, Likely secondary to pleural effusion caused by CHF   S/P extubation on 10/13  Pigtail removed 10/15  Saturating well on room air; 100%  Patient stable w/o respiratory distress

## 2021-11-02 NOTE — PROGRESS NOTE ADULT - PROBLEM SELECTOR PROBLEM 1
Osteomyelitis of right foot, unspecified type
Right foot pain
Osteomyelitis of right foot, unspecified type
Pulmonary edema
Right foot pain
Osteomyelitis of right foot, unspecified type
Acute osteomyelitis
Right foot pain
Acute osteomyelitis
Cellulitis of right foot
Cellulitis of right foot
Osteomyelitis of right foot, unspecified type
Right foot pain
Acute osteomyelitis
Cellulitis of right foot
Pulmonary edema
Acute osteomyelitis
Cellulitis of right foot
Osteomyelitis of right foot, unspecified type
Acute osteomyelitis
Cellulitis of right foot
Cellulitis of right foot
Osteomyelitis of right foot, unspecified type
Acute osteomyelitis
Osteomyelitis of right foot, unspecified type
Osteomyelitis of right foot, unspecified type
Pulmonary edema
Osteomyelitis of right foot, unspecified type
Osteomyelitis of right foot, unspecified type
Pulmonary edema
Osteomyelitis of right foot, unspecified type
Acute osteomyelitis
Osteomyelitis of right foot, unspecified type
Osteomyelitis of right foot, unspecified type

## 2021-11-02 NOTE — PROGRESS NOTE ADULT - SUBJECTIVE AND OBJECTIVE BOX
Podiatry Interval: Pt was seen resting in bed. No signs of acute distress. Pt had a wound vac on that was removed on 10/13, continuing with LWC. Pt denies N/V/F/C/SOB.     Podiatry HPI: 76 year old male with a PMHx of DM, HTN, HLD, CAD to ED presents to the ED for a Right Foot s/p 5th foot partial ray resection and fillet toe flap. Patient states that he has sharp localized non-radiating pain to the Right foot 5th metatarsal. States that after his surgery, he did not see a podiatrist and he came into the ED because he saw drainage and was having pain. Denies any constitutional symptoms of N/V/C/F/SOB. Denies any other pedal complaints at this time. Denies calf pain today, bilaterally.    Medications acetaminophen   Tablet .. 650 milliGRAM(s) Oral every 6 hours PRN  apixaban 5 milliGRAM(s) Oral every 12 hours  aspirin  chewable 81 milliGRAM(s) Oral daily  atorvastatin 80 milliGRAM(s) Oral at bedtime  chlorhexidine 2% Cloths 1 Application(s) Topical <User Schedule>  collagenase Ointment 1 Application(s) Topical daily  cyanocobalamin 1000 MICROGram(s) Oral daily  dextrose 5% + sodium chloride 0.45%. 1000 milliLiter(s) IV Continuous <Continuous>  dextrose 5%. 1000 milliLiter(s) IV Continuous <Continuous>  ergocalciferol 53355 Unit(s) Oral <User Schedule>  ferrous    sulfate Liquid 300 milliGRAM(s) Enteral Tube daily  finasteride 5 milliGRAM(s) Oral daily  gabapentin 100 milliGRAM(s) Oral three times a day  glucagon  Injectable 1 milliGRAM(s) IntraMuscular once  insulin lispro (ADMELOG) corrective regimen sliding scale   SubCutaneous Before meals and at bedtime  metoprolol tartrate 25 milliGRAM(s) Oral two times a day  NIFEdipine XL 60 milliGRAM(s) Oral daily  ondansetron Injectable 4 milliGRAM(s) IV Push three times a day PRN  pantoprazole    Tablet 40 milliGRAM(s) Oral before breakfast  piperacillin/tazobactam IVPB.. 3.375 Gram(s) IV Intermittent every 8 hours  sodium chloride 0.9% lock flush 10 milliLiter(s) IV Push every 1 hour PRN    FHFamily history of acute myocardial infarction (Father)    Family history of diabetes mellitus (Mother, Sibling)    Family history of hypertension (Mother, Sibling)    Family history of hyperlipidemia (Mother, Sibling)    ,   PMHHTN (hypertension)    HLD (hyperlipidemia)    DM (diabetes mellitus)    CAD (coronary artery disease)    Glaucoma, angle-closure    [place] (hard of hearing)       PSHNo significant past surgical history    S/P CABG (coronary artery bypass graft)    History of thyroid surgery        Labs                          10.1   4.63  )-----------( 247      ( 02 Nov 2021 07:12 )             31.7      11-02    141  |  109<H>  |  11  ----------------------------<  100<H>  3.7   |  25  |  1.18    Ca    9.1      02 Nov 2021 07:12  Phos  2.8     11-01  Mg     2.0     11-01       Vital Signs Last 24 Hrs  T(C): 36.5 (02 Nov 2021 14:03), Max: 36.8 (01 Nov 2021 20:56)  T(F): 97.7 (02 Nov 2021 14:03), Max: 98.2 (01 Nov 2021 20:56)  HR: 71 (02 Nov 2021 14:03) (66 - 71)  BP: 135/65 (02 Nov 2021 14:03) (125/53 - 138/66)  BP(mean): --  RR: 17 (02 Nov 2021 14:03) (17 - 18)  SpO2: 100% (02 Nov 2021 14:03) (100% - 100%)  Sedimentation Rate, Erythrocyte: 85 mm/Hr (10-08-21 @ 03:52)  Sedimentation Rate, Erythrocyte: 108 mm/Hr (10-02-21 @ 07:02)         C-Reactive Protein, Serum: 53 mg/L (10-08-21 @ 12:10)  C-Reactive Protein, Serum: 43 mg/L (10-02-21 @ 14:05)  C-Reactive Protein, Serum: 45 mg/L (09-16-21 @ 23:33)  C-Reactive Protein, Serum: 85 mg/L (08-22-21 @ 21:30)  C-Reactive Protein, Serum: 67 mg/L (08-15-21 @ 11:55)   WBC Count: 4.63 K/uL (11-02-21 @ 07:12)    PHYSICAL EXAM  LE Focused:    Vasc:  DP/PT pulses were faintly palpable, bilateral. DP/PT pulses were monophasic on doppler, bilaterally. CFT<3 seconds to all digits.   Derm: Surgical site with graft in place to R 5th ray, incorporating well, granular wound bed through the graft with patches of necrotic islands noted, minimal drainage or discharge. minimal erythema and edema noted, eschar forming over the wound. Dorsal foot wound noted with tendon exposed, DTI noted to b/l heel.   11/2: No clinical signs of infection noted   Neuro: Protective sensation grossly diminished, b/l  MSK: 5/5 muscle strength noted to the pedal compartments. 5th digit amputation R foot        Imaging:    3 views right foot. Prior 8/20/2021.    Status post resection of the fifth toe and distal fifth metatarsal unchanged in appearance. No focal bone lysis or unusual periosteal reaction to suggest osteomyelitis. No acute fracture or dislocation. The remainder study unchanged. No soft tissue gas.    IMPRESSION: No acute finding. No plain film evidence of osteomyelitis.      MRI R foot:     INTERPRETATION:  Clinical Information: Recent fifth metatarsal head resection now with fifth digit pain, swelling and foul discharge.    Comparison: Radiographs of the right foot from 9/16/2021 and MRI the right foot from 8/16/2021.    Technique:  MRI of the right midfoot and forefoot.  Intravenous Contrast: None.    Findings:    There is a resection at the mid aspect of the fifth metatarsal shaft. There is a lateral soft tissue wound beginning at the level of the amputation which extends distally. There is susceptibility artifact in the region of the amputation consistent with postoperative change. There is hyperintense T2 marrow signal throughout the remaining fifth metatarsal and within the adjacent fourth metatarsal head which is nonspecific and could be related to recent postoperative changes although osteomyelitis is suspected.    There is edema and mild atrophy within the plantar muscles of the foot. There is minimal spurring at the first metatarsophalangeal and hallux sesamoid articulations.    Impression:  Resection at the mid aspect of the fifth metatarsal. Lateral soft tissue wound with marrow signal abnormality throughout the fifth metatarsal and within the adjacent fourth metatarsal head which is nonspecific in the setting of recent surgery although osteomyelitis is suspected.        Culture Results:     Rare Aeromonas hydrophila/caviae   Few Klebsiella oxytoca/Raoutella ornithinolytica   Few Enterococcus faecalis   Few Streptococcus agalactiae (Group B) isolated   Group B streptococci are susceptible to ampicillin,   penicillin and cefazolin, but may be resistant to   erythromycin and clindamycin.   Recommendations for intrapartum prophylaxis for Group B   streptococci are penicillin or ampicillin. (09.16.21 @ 21:42)     Gram Stain:   No polymorphonuclear cells seen per low power field   No organisms seen per oil power field (09.22.21 @ 13:54)       Historical Values  Gram Stain:   No polymorphonuclear cells seen per low power field   No organisms seen per oil power field (09.22.21 @ 13:54)   Gram Stain:   No polymorphonuclear leukocytes seen per low power field   No organisms seen per oil power field (08.20.21 @ 03:59)   Cultures (11/2): Pending

## 2021-11-02 NOTE — PROGRESS NOTE ADULT - PROBLEM SELECTOR PLAN 8
OREN resolved  Scr now 1.1  Failed TOV- will need to continue ibrahim and outpatient f/u with Urology  closely monitor SCr, as patient is  on Zosyn until 11/3

## 2021-11-02 NOTE — PROGRESS NOTE ADULT - SUBJECTIVE AND OBJECTIVE BOX
Patient is seen and examined at the bed side, is afebrile. He is doing better, no new events.       REVIEW OF SYSTEMS: All other review systems are negative      ALLERGIES: No Known Allergies      Vital Signs Last 24 Hrs  T(C): 36.8 (02 Nov 2021 04:35), Max: 36.8 (01 Nov 2021 20:56)  T(F): 98.2 (02 Nov 2021 04:35), Max: 98.2 (01 Nov 2021 20:56)  HR: 66 (02 Nov 2021 04:35) (66 - 69)  BP: 138/66 (02 Nov 2021 04:35) (125/53 - 145/69)  BP(mean): --  RR: 18 (02 Nov 2021 04:35) (18 - 18)  SpO2: 100% (02 Nov 2021 04:35) (100% - 100%)      PHYSICAL EXAM:  GENERAL: Not in distress  CHEST/LUNG:  Not using accessory muscles, s/p removal of Right CT   HEART: s1 and s2 present  ABDOMEN:  Mild distended   EXTREMITIES: Right foot bandage in placed   CNS: Awake and alert       LABS:                         10.1   4.63  )-----------( 247      ( 02 Nov 2021 07:12 )             31.7                           10.0   4.43  )-----------( 245      ( 01 Nov 2021 05:55 )             31.2       11-02    141  |  109<H>  |  11  ----------------------------<  100<H>  3.7   |  25  |  1.18    Ca    9.1      02 Nov 2021 07:12  Phos  2.8     11-01  Mg     2.0     11-01 11-01    141  |  109<H>  |  9   ----------------------------<  104<H>  3.6   |  24  |  1.15    Ca    9.1      01 Nov 2021 05:55  Phos  2.8     11-01  Mg     2.0     11-01    TPro  7.1  /  Alb  2.6<L>  /  TBili  0.7  /  DBili  x   /  AST  27  /  ALT  34  /  AlkPhos  125<H>  10-25      09-25    139  |  107  |  41<H>  ----------------------------<  134<H>  4.1   |  21<L>  |  4.05<H>    Ca    8.6      25 Sep 2021 06:40    TPro  6.6  /  Alb  2.3<L>  /  TBili  0.5  /  DBili  x   /  AST  25  /  ALT  15  /  AlkPhos  102  09-25        CAPILLARY BLOOD GLUCOSE  POCT Blood Glucose.: 134 mg/dL (17 Sep 2021 17:11)  POCT Blood Glucose.: 128 mg/dL (17 Sep 2021 11:06)  POCT Blood Glucose.: 157 mg/dL (17 Sep 2021 04:10)      Vancomycin Level, Trough (10.14.21 @ 11:32) : 21.8  Vancomycin Level, Trough (10.12.21 @ 12:13) : 19.5:      MEDICATIONS  (STANDING):    apixaban 5 milliGRAM(s) Oral every 12 hours  aspirin  chewable 81 milliGRAM(s) Oral daily  atorvastatin 80 milliGRAM(s) Oral at bedtime  chlorhexidine 2% Cloths 1 Application(s) Topical <User Schedule>  collagenase Ointment 1 Application(s) Topical daily  cyanocobalamin 1000 MICROGram(s) Oral daily  dextrose 5% + sodium chloride 0.45%. 1000 milliLiter(s) (75 mL/Hr) IV Continuous <Continuous>  dextrose 5%. 1000 milliLiter(s) (100 mL/Hr) IV Continuous <Continuous>  ergocalciferol 79753 Unit(s) Oral <User Schedule>  ferrous    sulfate Liquid 300 milliGRAM(s) Enteral Tube daily  finasteride 5 milliGRAM(s) Oral daily  gabapentin 100 milliGRAM(s) Oral three times a day  glucagon  Injectable 1 milliGRAM(s) IntraMuscular once  insulin lispro (ADMELOG) corrective regimen sliding scale   SubCutaneous Before meals and at bedtime  metoprolol tartrate 25 milliGRAM(s) Oral two times a day  NIFEdipine XL 60 milliGRAM(s) Oral daily  pantoprazole    Tablet 40 milliGRAM(s) Oral before breakfast  piperacillin/tazobactam IVPB.. 3.375 Gram(s) IV Intermittent every 8 hours      RADIOLOGY & ADDITIONAL TESTS:    10/5/21 CT Chest No Cont (10.05.21 @ 14:07) Pulmonary edema with bilateral moderate to large pleural effusions. Underlying bilateral lower lobe compressive atelectasis versus pneumonia.  Mildly enlarged mediastinal lymph nodes, possibly reactive.      10/2/21: Xray Chest 1 View- PORTABLE-Routine (Xray Chest 1 View- PORTABLE-Routine in AM.) (10.02.21 @ 09:46) There is persistent bilateral perihilar/basilar diffuse airspace disease and/or RIGHT effusion.  Cardiomegaly.  No interval change.    Collected Date/Time: 9/22/2021 08:42 EDT   Received Date/Time: 9/23/2021 09:29 EDT   Surgical Pathology Report - Auth (Verified)   Specimen(s) Submitted   1 Right 5th Toe Wound Debridement r/o Osteomyelitis   Final Diagnosis   Right fifth toe; wound debridement:   - Bone with acute osteomyelitis and necrotic periosteal tissue.     9/28/21: US Abdomen Upper Quadrant Right (09.28.21 @ 12:13) Cholelithiasis without evidence of acute cholecystitis. Note is made of right pleural effusion    9/27/21: CT Abdomen and Pelvis w/ Oral Cont (09.27.21 @ 15:53) >No acute intra-abdominal pathology.    Small bilateral pleural effusions with compressive atelectasis of both lower lobes.    9/26/21: Xray Chest 1 View-PORTABLE IMMEDIATE (Xray Chest 1 View-PORTABLE IMMEDIATE .) (09.26.21 @ 15:28) : Small bilateral pleural effusions and mild pulmonary edema. A right lower lobe pneumonia is not excluded.      9/26/21: Xray Abdomen 1 View Portable, IMMEDIATE (Xray Abdomen 1 View Portable, IMMEDIATE .) (09.26.21 @ 15:28) : There is a nasogastric tube with its tip in the stomach. There is gaseous distention of the colon. No pathologic calcifications are seen. The osseous structures are intact with degenerative change is present in the spine.      9/17/21 : MR Foot No Cont, Right (09.17.21 @ 13:04) : Resection at the mid aspect of the fifth metatarsal. Lateral soft tissue wound with marrow signal abnormality throughout the fifth metatarsal and within the adjacent fourth metatarsal head which is nonspecific in the setting of recent surgery although osteomyelitis is suspected.    9/16/21 : Xray Foot AP + Lateral + Oblique, Right (09.16.21 @ 15:03) : No acute finding. No plain film evidence of osteomyelitis.        MICROBIOLOGY DATA:    COVID-19 PCR (10.11.21 @ 05:44)   COVID-19 PCR: NotDetec:   Urine Microscopic-Add On (NC) (09.22.21 @ 16:14)   Red Blood Cell - Urine: 0-2 /HPF   White Blood Cell - Urine: 3-5 /HPF   Bacteria: Moderate /HPF   Comment - Urine: yeast cells present   Epithelial Cells: Moderate /HPF     Culture - Tissue with Gram Stain (09.22.21 @ 13:54)   Gram Stain: No polymorphonuclear cells seen per low power field   No organisms seen per oil power field   Specimen Source: .Tissue Right 5th toe r/o osteomyeltis   Culture Results: No growth     COVID-19 David Domain Antibody (09.18.21 @ 12:55)   COVID-19 David Domain Antibody Result: >250.00:    Culture - Blood (09.17.21 @ 10:28)   Specimen Source: .Blood Blood-Peripheral   Culture Results: No growth to date.     Culture - Blood (09.17.21 @ 10:28)   Specimen Source: .Blood Blood-Venous   Culture Results: No growth to date.     Culture - Surgical Swab (09.16.21 @ 21:42)   - Amikacin: S <=16   - Amikacin: S <=16   - Amoxicillin/Clavulanic Acid: S <=8/4   - Ampicillin: R >16 These ampicillin results predict results for amoxicillin   - Ampicillin: S <=2 Predicts results to ampicillin/sulbactam, amoxacillin-clavulanate and piperacillin-tazobactam.   - Ampicillin/Sulbactam: S 8/4 Enterobacter, Citrobacter, and Serratia may develop resistance during prolonged therapy (3-4 days)   - Ampicillin/Sulbactam: R >16/8   - Aztreonam: S <=4   - Aztreonam: S <=4   - Cefazolin: R 16 Enterobacter, Citrobacter, and Serratia may develop resistance during prolonged therapy (3-4 days)   - Cefazolin: R >16   - Cefepime: S <=2   - Cefepime: S <=2   - Cefoxitin: S <=8   - Cefoxitin: S <=8   - Ceftazidime: S <=1   - Ceftriaxone: S <=1   - Ceftriaxone: S <=1 Enterobacter, Citrobacter, and Serratia may develop resistance during prolonged therapy   - Ciprofloxacin: S <=0.25   - Ciprofloxacin: S <=0.25   - Ertapenem: S <=0.5   - Gentamicin: S <=2   - Gentamicin: S <=2   - Imipenem: S <=1   - Levofloxacin: S <=0.5   - Levofloxacin: S <=0.5   - Meropenem: S <=1   - Meropenem: S <=1   - Piperacillin/Tazobactam: S <=8   - Piperacillin/Tazobactam: S <=8   - Tetra/Doxy: S 4   - Tobramycin: S <=2   - Trimethoprim/Sulfamethoxazole: S <=0.5/9.5   - Trimethoprim/Sulfamethoxazole: S <=0.5/9.5   - Vancomycin: S 2   Specimen Source: .Surgical Swab right foot wound   Culture Results:   Culture yields >4 types of aerobic and/or anaerobic bacteria   Call client services within 7 days if further workup is clinically   indicated. Culture includes   Rare Aeromonas hydrophila/caviae   Few Klebsiella oxytoca/Raoutella ornithinolytica   Few Enterococcus faecalis   Few Streptococcus agalactiae (Group B) isolated   Group B streptococci are susceptible to ampicillin,   penicillin and cefazolin, but may be resistant to   erythromycin and clindamycin.   Recommendations for intrapartum prophylaxis for Group B   streptococci are penicillin or ampicillin.   Organism Identification: Aeromonas hydrophila/caviae   Klebsiella oxytoca /Raoutella ornithinolytica   Enterococcus faecalis   Organism: Aeromonas hydrophila/caviae   Organism: Klebsiella oxytoca /Raoutella ornithinolytica   Organism: Enterococcus faecalis   COVID-19 PCR . (09.16.21 @ 22:24)   COVID-19 PCR: NotDetec:

## 2021-11-02 NOTE — PROGRESS NOTE ADULT - SUBJECTIVE AND OBJECTIVE BOX
NP Note discussed with  Primary Attending; Dr Samaniego    Patient is a 76y old  Male who presents with a chief complaint of R Foot Pain (02 Nov 2021 14:57)      INTERVAL HPI/OVERNIGHT EVENTS: Patient seen and examined earlier this am; no new complaints    MEDICATIONS  (STANDING):  apixaban 5 milliGRAM(s) Oral every 12 hours  aspirin  chewable 81 milliGRAM(s) Oral daily  atorvastatin 80 milliGRAM(s) Oral at bedtime  chlorhexidine 2% Cloths 1 Application(s) Topical <User Schedule>  collagenase Ointment 1 Application(s) Topical daily  cyanocobalamin 1000 MICROGram(s) Oral daily  dextrose 5% + sodium chloride 0.45%. 1000 milliLiter(s) (75 mL/Hr) IV Continuous <Continuous>  dextrose 5%. 1000 milliLiter(s) (100 mL/Hr) IV Continuous <Continuous>  ergocalciferol 88460 Unit(s) Oral <User Schedule>  ferrous    sulfate Liquid 300 milliGRAM(s) Enteral Tube daily  finasteride 5 milliGRAM(s) Oral daily  gabapentin 100 milliGRAM(s) Oral three times a day  glucagon  Injectable 1 milliGRAM(s) IntraMuscular once  insulin lispro (ADMELOG) corrective regimen sliding scale   SubCutaneous Before meals and at bedtime  metoprolol tartrate 25 milliGRAM(s) Oral two times a day  NIFEdipine XL 60 milliGRAM(s) Oral daily  pantoprazole    Tablet 40 milliGRAM(s) Oral before breakfast  piperacillin/tazobactam IVPB.. 3.375 Gram(s) IV Intermittent every 8 hours    MEDICATIONS  (PRN):  acetaminophen   Tablet .. 650 milliGRAM(s) Oral every 6 hours PRN Temp greater or equal to 38C (100.4F), Moderate Pain (4 - 6)  ondansetron Injectable 4 milliGRAM(s) IV Push three times a day PRN Nausea and/or Vomiting  sodium chloride 0.9% lock flush 10 milliLiter(s) IV Push every 1 hour PRN Pre/post blood products, medications, blood draw, and to maintain line patency      __________________________________________________  REVIEW OF SYSTEMS:    CONSTITUTIONAL: No fever,   RESPIRATORY: No cough; No shortness of breath  CARDIOVASCULAR: No chest pain, no palpitations  GASTROINTESTINAL: No pain. No nausea or vomiting; No diarrhea   NEUROLOGICAL: No headache or numbness, no dizziness   MUSCULOSKELETAL: No joint pain, no muscle pain  GENITOURINARY: no discomfort   PSYCHIATRY: no depression , no anxiety  ALL OTHER  ROS negative        Vital Signs Last 24 Hrs  T(C): 36.5 (02 Nov 2021 14:03), Max: 36.8 (01 Nov 2021 20:56)  T(F): 97.7 (02 Nov 2021 14:03), Max: 98.2 (01 Nov 2021 20:56)  HR: 69 (02 Nov 2021 16:54) (66 - 71)  BP: 108/55 (02 Nov 2021 16:54) (108/55 - 138/66)  BP(mean): --  RR: 17 (02 Nov 2021 14:03) (17 - 18)  SpO2: 100% (02 Nov 2021 14:03) (100% - 100%)    ________________________________________________  PHYSICAL EXAM:  GEN: NAD  CHEST/LUNG: breathing nonlabored; clear to auscultation bilaterally  HEART: +S1 +S2  regular  ABDOMEN: Soft, Nontender, Nondistended; Bowel sounds present, ibrahim to bsd yellow urine   EXTREMITIES: no cyanosis; no edema; no calf tenderness  SKIN: warm and dry; RLE dressing C/D/I   NERVOUS SYSTEM:  Awake and alert; Oriented  to place, person ; no new deficits    _________________________________________________  LABS:                        10.1   4.63  )-----------( 247      ( 02 Nov 2021 07:12 )             31.7     11-02    141  |  109<H>  |  11  ----------------------------<  100<H>  3.7   |  25  |  1.18    Ca    9.1      02 Nov 2021 07:12  Phos  2.8     11-01  Mg     2.0     11-01          CAPILLARY BLOOD GLUCOSE      POCT Blood Glucose.: 179 mg/dL (02 Nov 2021 16:23)  POCT Blood Glucose.: 118 mg/dL (02 Nov 2021 11:54)  POCT Blood Glucose.: 106 mg/dL (02 Nov 2021 07:42)  POCT Blood Glucose.: 112 mg/dL (01 Nov 2021 21:17)

## 2021-11-03 VITALS
DIASTOLIC BLOOD PRESSURE: 54 MMHG | OXYGEN SATURATION: 100 % | HEART RATE: 76 BPM | SYSTOLIC BLOOD PRESSURE: 119 MMHG | RESPIRATION RATE: 20 BRPM | TEMPERATURE: 98 F

## 2021-11-03 LAB
GLUCOSE BLDC GLUCOMTR-MCNC: 115 MG/DL — HIGH (ref 70–99)
GLUCOSE BLDC GLUCOMTR-MCNC: 135 MG/DL — HIGH (ref 70–99)
GLUCOSE BLDC GLUCOMTR-MCNC: 158 MG/DL — HIGH (ref 70–99)

## 2021-11-03 PROCEDURE — 86900 BLOOD TYPING SEROLOGIC ABO: CPT

## 2021-11-03 PROCEDURE — 87641 MR-STAPH DNA AMP PROBE: CPT

## 2021-11-03 PROCEDURE — 85652 RBC SED RATE AUTOMATED: CPT

## 2021-11-03 PROCEDURE — 82042 OTHER SOURCE ALBUMIN QUAN EA: CPT

## 2021-11-03 PROCEDURE — 73718 MRI LOWER EXTREMITY W/O DYE: CPT | Mod: MA

## 2021-11-03 PROCEDURE — 94760 N-INVAS EAR/PLS OXIMETRY 1: CPT

## 2021-11-03 PROCEDURE — 88112 CYTOPATH CELL ENHANCE TECH: CPT

## 2021-11-03 PROCEDURE — 82436 ASSAY OF URINE CHLORIDE: CPT

## 2021-11-03 PROCEDURE — 82570 ASSAY OF URINE CREATININE: CPT

## 2021-11-03 PROCEDURE — 71250 CT THORAX DX C-: CPT

## 2021-11-03 PROCEDURE — 82803 BLOOD GASES ANY COMBINATION: CPT

## 2021-11-03 PROCEDURE — 74176 CT ABD & PELVIS W/O CONTRAST: CPT

## 2021-11-03 PROCEDURE — 36573 INSJ PICC RS&I 5 YR+: CPT

## 2021-11-03 PROCEDURE — 86140 C-REACTIVE PROTEIN: CPT

## 2021-11-03 PROCEDURE — 83615 LACTATE (LD) (LDH) ENZYME: CPT

## 2021-11-03 PROCEDURE — 83735 ASSAY OF MAGNESIUM: CPT

## 2021-11-03 PROCEDURE — P9047: CPT

## 2021-11-03 PROCEDURE — 85027 COMPLETE CBC AUTOMATED: CPT

## 2021-11-03 PROCEDURE — 74018 RADEX ABDOMEN 1 VIEW: CPT

## 2021-11-03 PROCEDURE — 88305 TISSUE EXAM BY PATHOLOGIST: CPT

## 2021-11-03 PROCEDURE — 86901 BLOOD TYPING SEROLOGIC RH(D): CPT

## 2021-11-03 PROCEDURE — 87206 SMEAR FLUORESCENT/ACID STAI: CPT

## 2021-11-03 PROCEDURE — 87077 CULTURE AEROBIC IDENTIFY: CPT

## 2021-11-03 PROCEDURE — 85730 THROMBOPLASTIN TIME PARTIAL: CPT

## 2021-11-03 PROCEDURE — 84540 ASSAY OF URINE/UREA-N: CPT

## 2021-11-03 PROCEDURE — 89051 BODY FLUID CELL COUNT: CPT

## 2021-11-03 PROCEDURE — 83605 ASSAY OF LACTIC ACID: CPT

## 2021-11-03 PROCEDURE — 71045 X-RAY EXAM CHEST 1 VIEW: CPT

## 2021-11-03 PROCEDURE — C1751: CPT

## 2021-11-03 PROCEDURE — 82272 OCCULT BLD FECES 1-3 TESTS: CPT

## 2021-11-03 PROCEDURE — 80048 BASIC METABOLIC PNL TOTAL CA: CPT

## 2021-11-03 PROCEDURE — 80202 ASSAY OF VANCOMYCIN: CPT

## 2021-11-03 PROCEDURE — 97110 THERAPEUTIC EXERCISES: CPT

## 2021-11-03 PROCEDURE — 87015 SPECIMEN INFECT AGNT CONCNTJ: CPT

## 2021-11-03 PROCEDURE — 88304 TISSUE EXAM BY PATHOLOGIST: CPT

## 2021-11-03 PROCEDURE — 85610 PROTHROMBIN TIME: CPT

## 2021-11-03 PROCEDURE — 87075 CULTR BACTERIA EXCEPT BLOOD: CPT

## 2021-11-03 PROCEDURE — 76705 ECHO EXAM OF ABDOMEN: CPT

## 2021-11-03 PROCEDURE — 82962 GLUCOSE BLOOD TEST: CPT

## 2021-11-03 PROCEDURE — 87640 STAPH A DNA AMP PROBE: CPT

## 2021-11-03 PROCEDURE — 87070 CULTURE OTHR SPECIMN AEROBIC: CPT

## 2021-11-03 PROCEDURE — 84484 ASSAY OF TROPONIN QUANT: CPT

## 2021-11-03 PROCEDURE — 84300 ASSAY OF URINE SODIUM: CPT

## 2021-11-03 PROCEDURE — 83880 ASSAY OF NATRIURETIC PEPTIDE: CPT

## 2021-11-03 PROCEDURE — 86160 COMPLEMENT ANTIGEN: CPT

## 2021-11-03 PROCEDURE — 83036 HEMOGLOBIN GLYCOSYLATED A1C: CPT

## 2021-11-03 PROCEDURE — 92611 MOTION FLUOROSCOPY/SWALLOW: CPT

## 2021-11-03 PROCEDURE — 93005 ELECTROCARDIOGRAM TRACING: CPT

## 2021-11-03 PROCEDURE — 87635 SARS-COV-2 COVID-19 AMP PRB: CPT

## 2021-11-03 PROCEDURE — 86850 RBC ANTIBODY SCREEN: CPT

## 2021-11-03 PROCEDURE — 84100 ASSAY OF PHOSPHORUS: CPT

## 2021-11-03 PROCEDURE — 36415 COLL VENOUS BLD VENIPUNCTURE: CPT

## 2021-11-03 PROCEDURE — 74019 RADEX ABDOMEN 2 VIEWS: CPT

## 2021-11-03 PROCEDURE — 87040 BLOOD CULTURE FOR BACTERIA: CPT

## 2021-11-03 PROCEDURE — 97530 THERAPEUTIC ACTIVITIES: CPT

## 2021-11-03 PROCEDURE — 97162 PT EVAL MOD COMPLEX 30 MIN: CPT

## 2021-11-03 PROCEDURE — 84157 ASSAY OF PROTEIN OTHER: CPT

## 2021-11-03 PROCEDURE — 83986 ASSAY PH BODY FLUID NOS: CPT

## 2021-11-03 PROCEDURE — 77001 FLUOROGUIDE FOR VEIN DEVICE: CPT

## 2021-11-03 PROCEDURE — 82550 ASSAY OF CK (CPK): CPT

## 2021-11-03 PROCEDURE — 87102 FUNGUS ISOLATION CULTURE: CPT

## 2021-11-03 PROCEDURE — 93306 TTE W/DOPPLER COMPLETE: CPT

## 2021-11-03 PROCEDURE — 87186 SC STD MICRODIL/AGAR DIL: CPT

## 2021-11-03 PROCEDURE — 73630 X-RAY EXAM OF FOOT: CPT

## 2021-11-03 PROCEDURE — 86060 ANTISTREPTOLYSIN O TITER: CPT

## 2021-11-03 PROCEDURE — 74230 X-RAY XM SWLNG FUNCJ C+: CPT

## 2021-11-03 PROCEDURE — 85025 COMPLETE CBC W/AUTO DIFF WBC: CPT

## 2021-11-03 PROCEDURE — 81001 URINALYSIS AUTO W/SCOPE: CPT

## 2021-11-03 PROCEDURE — 76770 US EXAM ABDO BACK WALL COMP: CPT

## 2021-11-03 PROCEDURE — 92610 EVALUATE SWALLOWING FUNCTION: CPT

## 2021-11-03 PROCEDURE — 94003 VENT MGMT INPAT SUBQ DAY: CPT

## 2021-11-03 PROCEDURE — 86769 SARS-COV-2 COVID-19 ANTIBODY: CPT

## 2021-11-03 PROCEDURE — 87116 MYCOBACTERIA CULTURE: CPT

## 2021-11-03 PROCEDURE — 80053 COMPREHEN METABOLIC PANEL: CPT

## 2021-11-03 PROCEDURE — 82945 GLUCOSE OTHER FLUID: CPT

## 2021-11-03 PROCEDURE — 87205 SMEAR GRAM STAIN: CPT

## 2021-11-03 PROCEDURE — 76937 US GUIDE VASCULAR ACCESS: CPT

## 2021-11-03 PROCEDURE — 82553 CREATINE MB FRACTION: CPT

## 2021-11-03 PROCEDURE — 99285 EMERGENCY DEPT VISIT HI MDM: CPT

## 2021-11-03 RX ORDER — APIXABAN 2.5 MG/1
1 TABLET, FILM COATED ORAL
Qty: 0 | Refills: 0 | DISCHARGE
Start: 2021-11-03

## 2021-11-03 RX ORDER — COLLAGENASE CLOSTRIDIUM HIST. 250 UNIT/G
1 OINTMENT (GRAM) TOPICAL
Qty: 0 | Refills: 0 | DISCHARGE
Start: 2021-11-03

## 2021-11-03 RX ORDER — GABAPENTIN 400 MG/1
1 CAPSULE ORAL
Qty: 0 | Refills: 0 | DISCHARGE
Start: 2021-11-03

## 2021-11-03 RX ADMIN — Medication 300 MILLIGRAM(S): at 11:16

## 2021-11-03 RX ADMIN — APIXABAN 5 MILLIGRAM(S): 2.5 TABLET, FILM COATED ORAL at 05:40

## 2021-11-03 RX ADMIN — Medication 1 APPLICATION(S): at 11:18

## 2021-11-03 RX ADMIN — GABAPENTIN 100 MILLIGRAM(S): 400 CAPSULE ORAL at 14:38

## 2021-11-03 RX ADMIN — Medication 25 MILLIGRAM(S): at 17:09

## 2021-11-03 RX ADMIN — PIPERACILLIN AND TAZOBACTAM 25 GRAM(S): 4; .5 INJECTION, POWDER, LYOPHILIZED, FOR SOLUTION INTRAVENOUS at 14:36

## 2021-11-03 RX ADMIN — Medication 25 MILLIGRAM(S): at 05:40

## 2021-11-03 RX ADMIN — PANTOPRAZOLE SODIUM 40 MILLIGRAM(S): 20 TABLET, DELAYED RELEASE ORAL at 06:04

## 2021-11-03 RX ADMIN — GABAPENTIN 100 MILLIGRAM(S): 400 CAPSULE ORAL at 05:39

## 2021-11-03 RX ADMIN — Medication 1: at 12:12

## 2021-11-03 RX ADMIN — Medication 60 MILLIGRAM(S): at 05:39

## 2021-11-03 RX ADMIN — Medication 81 MILLIGRAM(S): at 11:16

## 2021-11-03 RX ADMIN — PREGABALIN 1000 MICROGRAM(S): 225 CAPSULE ORAL at 11:16

## 2021-11-03 RX ADMIN — PIPERACILLIN AND TAZOBACTAM 25 GRAM(S): 4; .5 INJECTION, POWDER, LYOPHILIZED, FOR SOLUTION INTRAVENOUS at 05:40

## 2021-11-03 RX ADMIN — APIXABAN 5 MILLIGRAM(S): 2.5 TABLET, FILM COATED ORAL at 17:09

## 2021-11-03 RX ADMIN — FINASTERIDE 5 MILLIGRAM(S): 5 TABLET, FILM COATED ORAL at 11:16

## 2021-11-03 RX ADMIN — CHLORHEXIDINE GLUCONATE 1 APPLICATION(S): 213 SOLUTION TOPICAL at 06:03

## 2021-11-03 NOTE — CHART NOTE - NSCHARTNOTEFT_GEN_A_CORE
Reassessment:     Patient is a 76y old  Male who presents with a chief complaint of R Foot Pain (2021 15:24)      Factors impacting intake: [ ] none [ ] nausea  [ ] vomiting [ ] diarrhea [ ] constipation  [ ]chewing problems [ ] swallowing issues  [X ] other: New Afib, soft tissue foot infection, diabetes     Diet Prescription: Soft & Bite Size w/consistent carbohydrate & Glucerna Shake 1can BID on 10/28/21     Intake: Patient visited at lunch, reports of improved po intake, drinking ~50% of Glucerna Shake at least as one meal with feeding assistance, no reports of GI distress, pt. food choices f/up with Kitchen/Diet Tech noted, possible d/c to NH today? rec. c/w diet as ordered with Glucerna Shake, 1can TID as medically feasible. RD available.     Daily Weight in k.5 (30 Oct 2021 05:21)    % Weight Change: stable     Pertinent Medications: MEDICATIONS  (STANDING):  apixaban 5 milliGRAM(s) Oral every 12 hours  aspirin  chewable 81 milliGRAM(s) Oral daily  atorvastatin 80 milliGRAM(s) Oral at bedtime  chlorhexidine 2% Cloths 1 Application(s) Topical <User Schedule>  collagenase Ointment 1 Application(s) Topical daily  cyanocobalamin 1000 MICROGram(s) Oral daily  dextrose 5% + sodium chloride 0.45%. 1000 milliLiter(s) (75 mL/Hr) IV Continuous <Continuous>  dextrose 5%. 1000 milliLiter(s) (100 mL/Hr) IV Continuous <Continuous>  ergocalciferol 24617 Unit(s) Oral <User Schedule>  ferrous    sulfate Liquid 300 milliGRAM(s) Enteral Tube daily  finasteride 5 milliGRAM(s) Oral daily  gabapentin 100 milliGRAM(s) Oral three times a day  glucagon  Injectable 1 milliGRAM(s) IntraMuscular once  insulin lispro (ADMELOG) corrective regimen sliding scale   SubCutaneous Before meals and at bedtime  metoprolol tartrate 25 milliGRAM(s) Oral two times a day  NIFEdipine XL 60 milliGRAM(s) Oral daily  pantoprazole    Tablet 40 milliGRAM(s) Oral before breakfast  piperacillin/tazobactam IVPB.. 3.375 Gram(s) IV Intermittent every 8 hours    MEDICATIONS  (PRN):  acetaminophen   Tablet .. 650 milliGRAM(s) Oral every 6 hours PRN Temp greater or equal to 38C (100.4F), Moderate Pain (4 - 6)  ondansetron Injectable 4 milliGRAM(s) IV Push three times a day PRN Nausea and/or Vomiting  sodium chloride 0.9% lock flush 10 milliLiter(s) IV Push every 1 hour PRN Pre/post blood products, medications, blood draw, and to maintain line patency    Pertinent Labs:  Na141 mmol/L Glu 100 mg/dL<H> K+ 3.7 mmol/L Cr  1.18 mg/dL BUN 11 mg/dL  Phos 2.8 mg/dL     CAPILLARY BLOOD GLUCOSE      POCT Blood Glucose.: 135 mg/dL (2021 16:20)  POCT Blood Glucose.: 158 mg/dL (2021 11:42)  POCT Blood Glucose.: 115 mg/dL (2021 07:59)  POCT Blood Glucose.: 114 mg/dL (2021 21:18)    Skin: ongoing foot wound care     Estimated Needs:   [ X] no change since previous assessment  [ ] recalculated:     Previous Nutrition Diagnosis:   [ ] Inadequate Energy Intake [ X]Inadequate Oral Intake [ ] Excessive Energy Intake   [ ] Underweight [ ] Increased Nutrient Needs [ ] Overweight/Obesity   [ ] Altered GI Function [ ] Unintended Weight Loss [ ] Food & Nutrition Related Knowledge Deficit [ ] Malnutrition     Nutrition Diagnosis is [X ongoing  [ ] resolved [ ] not applicable     New Nutrition Diagnosis: [ ] not applicable     Interventions: To meet nutrition needs   Recommend  [ ] Change Diet To:  [ ] Nutrition Supplement  [ ] Nutrition Support  [ X] Other: Nursing to continue feeding assistance and encouragement, aspiration precaution     Monitoring and Evaluation:   [ X] PO intake [ x ] Tolerance to diet prescription [ x ] weights [ x ] labs[ x ] follow up per protocol  [ ] other:

## 2021-11-03 NOTE — PROGRESS NOTE ADULT - REASON FOR ADMISSION
R Foot Pain
R Foot Pain complicated by AHRF
R Foot Pain
Diabetic foot ulcer with OM
R Foot Pain

## 2021-11-03 NOTE — PROGRESS NOTE ADULT - SUBJECTIVE AND OBJECTIVE BOX
C A R D I O L O G Y  **********************************    DATE OF SERVICE: 11-03-21    Patient denies chest pain or shortness of breath.   Review of symptoms otherwise negative.    acetaminophen   Tablet .. 650 milliGRAM(s) Oral every 6 hours PRN  apixaban 5 milliGRAM(s) Oral every 12 hours  aspirin  chewable 81 milliGRAM(s) Oral daily  atorvastatin 80 milliGRAM(s) Oral at bedtime  chlorhexidine 2% Cloths 1 Application(s) Topical <User Schedule>  collagenase Ointment 1 Application(s) Topical daily  cyanocobalamin 1000 MICROGram(s) Oral daily  dextrose 5% + sodium chloride 0.45%. 1000 milliLiter(s) IV Continuous <Continuous>  dextrose 5%. 1000 milliLiter(s) IV Continuous <Continuous>  ergocalciferol 61764 Unit(s) Oral <User Schedule>  ferrous    sulfate Liquid 300 milliGRAM(s) Enteral Tube daily  finasteride 5 milliGRAM(s) Oral daily  gabapentin 100 milliGRAM(s) Oral three times a day  glucagon  Injectable 1 milliGRAM(s) IntraMuscular once  insulin lispro (ADMELOG) corrective regimen sliding scale   SubCutaneous Before meals and at bedtime  metoprolol tartrate 25 milliGRAM(s) Oral two times a day  NIFEdipine XL 60 milliGRAM(s) Oral daily  ondansetron Injectable 4 milliGRAM(s) IV Push three times a day PRN  pantoprazole    Tablet 40 milliGRAM(s) Oral before breakfast  piperacillin/tazobactam IVPB.. 3.375 Gram(s) IV Intermittent every 8 hours  sodium chloride 0.9% lock flush 10 milliLiter(s) IV Push every 1 hour PRN                            10.1   4.63  )-----------( 247      ( 02 Nov 2021 07:12 )             31.7       Hemoglobin: 10.1 g/dL (11-02 @ 07:12)  Hemoglobin: 10.0 g/dL (11-01 @ 05:55)  Hemoglobin: 10.0 g/dL (10-31 @ 06:26)      11-02    141  |  109<H>  |  11  ----------------------------<  100<H>  3.7   |  25  |  1.18    Ca    9.1      02 Nov 2021 07:12      Creatinine Trend: 1.18<--, 1.15<--, 1.26<--, 1.19<--, 1.25<--, 1.17<--    COAGS:           T(C): 37 (11-03-21 @ 04:53), Max: 37 (11-03-21 @ 04:53)  HR: 72 (11-03-21 @ 04:53) (69 - 74)  BP: 128/66 (11-03-21 @ 04:53) (108/55 - 135/65)  RR: 18 (11-03-21 @ 04:53) (16 - 18)  SpO2: 95% (11-03-21 @ 04:53) (95% - 100%)  Wt(kg): --    I&O's Summary    02 Nov 2021 07:01  -  03 Nov 2021 07:00  --------------------------------------------------------  IN: 0 mL / OUT: 750 mL / NET: -750 mL        HEENT:  (-)icterus (-)pallor  CV: N S1 S2 1/6 TRINIDAD (+)2 Pulses B/l  Resp:  Coarse sounds B/L, normal effort  GI: (+) BS Soft, NT, ND  Lymph:  (-)Edema, (-)obvious lymphadenopathy  Skin: Warm to touch, Normal turgor  Psych:  appropriate mood and affect        ASSESSMENT/PLAN: 	76y  Male PMH DM on insulin, HTN, HLD, normal lV fx moderate to severe AS 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    - crt improved  -  complete course of Abx per ID   - Echo noted, Severe AS, EF 40-45%   - He will need a SABIHA and R+L heart cath when he recovers and has no active infection  - Cont medical management of CAD  - Pt. with new onset PAF now on Eliquis  - No need for further inpatient cardiac work up.       Zak Wilkins MD, Located within [facility]  [place] [phone]

## 2021-11-03 NOTE — PROGRESS NOTE ADULT - ASSESSMENT
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  As per daughter, patient was taking tylenol which did not help his pain prompting the patient to ask his daughter to take him to the hospital. ON admission, he has no fever or Leukocytosis. The Xray of Right foot shows no Osteomyelitis but MRI of Right foot shows Lateral soft tissue wound with marrow signal abnormality throughout the fifth metatarsal which is consistent of Osteomyelitis. He has seen by Podiatry and wound culture has sent, Zosyn and Vancomycin has started. The ID consult requested to assist with further evaluation and antibiotic management.     # Right Fifth metatarsal DFU with drainage and Osteomyelitis- wound cx grew Enterococcus, Aeromonas and Klebsiella - Zosyn is the ideal to cover All organisms but kidney function is worsening, hence change to Unasyn and Levaquin until kidney function is improved - The pathology shows Bone with acute osteomyelitis and necrotic periosteal tissue.   # OREN- s/p urinary retention -s/p ibrahim catheter - s/p discontinue ACEI  # RLL pneumonia- most likely Aspiration, S/p Vomiting - On Unasyn  # Large bowel distension  # Candiduria- 9/22/21  # Pulmonary edema with bilateral moderate to large pleural effusions- on CT chest, 10/5/21- s/p intubation 10/7- s/p Right sided chest tube placement by CT team, 10/8/21  # COVID Exposure - PCR negative as of  10/11/21, 10/21/21 and re-exposed and in [place] until 11/1/21    would recommend:    1. Please remove the PICC line before discharge   2. Monitor kidney function closely   3. Aspiration precaution    4.. Wound care as per Podiatry  5. OOB to chair /PT    d/w Covering NP    Attending Attestation:    Spent more than 35 minutes on total encounter, more than 50 % of the visit was spent counseling and/or coordinating care by the Attending physician.

## 2021-11-03 NOTE — CHART NOTE - NSCHARTNOTESELECT_GEN_ALL_CORE
Event Note
Nutrition Services
Event Note
Failed TOV/Event Note
Nutrition Services
Pain after debridement/Event Note
Right Pigtail Catheter Removal/Event Note
wound vac application

## 2021-11-03 NOTE — PROGRESS NOTE ADULT - SUBJECTIVE AND OBJECTIVE BOX
Patient is seen and examined at the bed side, is afebrile.  The  discharge plan noted.       REVIEW OF SYSTEMS: All other review systems are negative      ALLERGIES: No Known Allergies      Vital Signs Last 24 Hrs  T(C): 36.4 (03 Nov 2021 14:24), Max: 37 (03 Nov 2021 04:53)  T(F): 97.6 (03 Nov 2021 14:24), Max: 98.6 (03 Nov 2021 04:53)  HR: 76 (03 Nov 2021 14:24) (69 - 76)  BP: 119/54 (03 Nov 2021 14:24) (108/55 - 131/71)  BP(mean): --  RR: 20 (03 Nov 2021 14:24) (16 - 20)  SpO2: 100% (03 Nov 2021 14:24) (95% - 100%)      PHYSICAL EXAM:  GENERAL: Not in distress  CHEST/LUNG:  Not using accessory muscles, s/p removal of Right CT   HEART: s1 and s2 present  ABDOMEN:  Mild distended   EXTREMITIES: Right foot bandage in placed   CNS: Awake and alert       LABS: No new Labs                        10.1   4.63  )-----------( 247      ( 02 Nov 2021 07:12 )             31.7                         10.0   4.43  )-----------( 245      ( 01 Nov 2021 05:55 )             31.2           11-02    141  |  109<H>  |  11  ----------------------------<  100<H>  3.7   |  25  |  1.18    Ca    9.1      02 Nov 2021 07:12  Phos  2.8     11-01  Mg     2.0     11-01 11-01    141  |  109<H>  |  9   ----------------------------<  104<H>  3.6   |  24  |  1.15    Ca    9.1      01 Nov 2021 05:55  Phos  2.8     11-01  Mg     2.0     11-01    TPro  7.1  /  Alb  2.6<L>  /  TBili  0.7  /  DBili  x   /  AST  27  /  ALT  34  /  AlkPhos  125<H>  10-25      09-25    139  |  107  |  41<H>  ----------------------------<  134<H>  4.1   |  21<L>  |  4.05<H>    Ca    8.6      25 Sep 2021 06:40    TPro  6.6  /  Alb  2.3<L>  /  TBili  0.5  /  DBili  x   /  AST  25  /  ALT  15  /  AlkPhos  102  09-25        CAPILLARY BLOOD GLUCOSE  POCT Blood Glucose.: 134 mg/dL (17 Sep 2021 17:11)  POCT Blood Glucose.: 128 mg/dL (17 Sep 2021 11:06)  POCT Blood Glucose.: 157 mg/dL (17 Sep 2021 04:10)      Vancomycin Level, Trough (10.14.21 @ 11:32) : 21.8  Vancomycin Level, Trough (10.12.21 @ 12:13) : 19.5:      MEDICATIONS  (STANDING):    apixaban 5 milliGRAM(s) Oral every 12 hours  aspirin  chewable 81 milliGRAM(s) Oral daily  atorvastatin 80 milliGRAM(s) Oral at bedtime  chlorhexidine 2% Cloths 1 Application(s) Topical <User Schedule>  collagenase Ointment 1 Application(s) Topical daily  cyanocobalamin 1000 MICROGram(s) Oral daily  dextrose 5% + sodium chloride 0.45%. 1000 milliLiter(s) (75 mL/Hr) IV Continuous <Continuous>  dextrose 5%. 1000 milliLiter(s) (100 mL/Hr) IV Continuous <Continuous>  ergocalciferol 19370 Unit(s) Oral <User Schedule>  ferrous    sulfate Liquid 300 milliGRAM(s) Enteral Tube daily  finasteride 5 milliGRAM(s) Oral daily  gabapentin 100 milliGRAM(s) Oral three times a day  glucagon  Injectable 1 milliGRAM(s) IntraMuscular once  insulin lispro (ADMELOG) corrective regimen sliding scale   SubCutaneous Before meals and at bedtime  metoprolol tartrate 25 milliGRAM(s) Oral two times a day  NIFEdipine XL 60 milliGRAM(s) Oral daily  pantoprazole    Tablet 40 milliGRAM(s) Oral before breakfast  piperacillin/tazobactam IVPB.. 3.375 Gram(s) IV Intermittent every 8 hours      RADIOLOGY & ADDITIONAL TESTS:    10/5/21 CT Chest No Cont (10.05.21 @ 14:07) Pulmonary edema with bilateral moderate to large pleural effusions. Underlying bilateral lower lobe compressive atelectasis versus pneumonia.  Mildly enlarged mediastinal lymph nodes, possibly reactive.      10/2/21: Xray Chest 1 View- PORTABLE-Routine (Xray Chest 1 View- PORTABLE-Routine in AM.) (10.02.21 @ 09:46) There is persistent bilateral perihilar/basilar diffuse airspace disease and/or RIGHT effusion.  Cardiomegaly.  No interval change.    Collected Date/Time: 9/22/2021 08:42 EDT   Received Date/Time: 9/23/2021 09:29 EDT   Surgical Pathology Report - Auth (Verified)   Specimen(s) Submitted   1 Right 5th Toe Wound Debridement r/o Osteomyelitis   Final Diagnosis   Right fifth toe; wound debridement:   - Bone with acute osteomyelitis and necrotic periosteal tissue.     9/28/21: US Abdomen Upper Quadrant Right (09.28.21 @ 12:13) Cholelithiasis without evidence of acute cholecystitis. Note is made of right pleural effusion    9/27/21: CT Abdomen and Pelvis w/ Oral Cont (09.27.21 @ 15:53) >No acute intra-abdominal pathology.    Small bilateral pleural effusions with compressive atelectasis of both lower lobes.    9/26/21: Xray Chest 1 View-PORTABLE IMMEDIATE (Xray Chest 1 View-PORTABLE IMMEDIATE .) (09.26.21 @ 15:28) : Small bilateral pleural effusions and mild pulmonary edema. A right lower lobe pneumonia is not excluded.      9/26/21: Xray Abdomen 1 View Portable, IMMEDIATE (Xray Abdomen 1 View Portable, IMMEDIATE .) (09.26.21 @ 15:28) : There is a nasogastric tube with its tip in the stomach. There is gaseous distention of the colon. No pathologic calcifications are seen. The osseous structures are intact with degenerative change is present in the spine.      9/17/21 : MR Foot No Cont, Right (09.17.21 @ 13:04) : Resection at the mid aspect of the fifth metatarsal. Lateral soft tissue wound with marrow signal abnormality throughout the fifth metatarsal and within the adjacent fourth metatarsal head which is nonspecific in the setting of recent surgery although osteomyelitis is suspected.    9/16/21 : Xray Foot AP + Lateral + Oblique, Right (09.16.21 @ 15:03) : No acute finding. No plain film evidence of osteomyelitis.        MICROBIOLOGY DATA:    COVID-19 PCR (10.11.21 @ 05:44)   COVID-19 PCR: NotDetec:   Urine Microscopic-Add On (NC) (09.22.21 @ 16:14)   Red Blood Cell - Urine: 0-2 /HPF   White Blood Cell - Urine: 3-5 /HPF   Bacteria: Moderate /HPF   Comment - Urine: yeast cells present   Epithelial Cells: Moderate /HPF     Culture - Tissue with Gram Stain (09.22.21 @ 13:54)   Gram Stain: No polymorphonuclear cells seen per low power field   No organisms seen per oil power field   Specimen Source: .Tissue Right 5th toe r/o osteomyeltis   Culture Results: No growth     COVID-19 David Domain Antibody (09.18.21 @ 12:55)   COVID-19 David Domain Antibody Result: >250.00:    Culture - Blood (09.17.21 @ 10:28)   Specimen Source: .Blood Blood-Peripheral   Culture Results: No growth to date.     Culture - Blood (09.17.21 @ 10:28)   Specimen Source: .Blood Blood-Venous   Culture Results: No growth to date.     Culture - Surgical Swab (09.16.21 @ 21:42)   - Amikacin: S <=16   - Amikacin: S <=16   - Amoxicillin/Clavulanic Acid: S <=8/4   - Ampicillin: R >16 These ampicillin results predict results for amoxicillin   - Ampicillin: S <=2 Predicts results to ampicillin/sulbactam, amoxacillin-clavulanate and piperacillin-tazobactam.   - Ampicillin/Sulbactam: S 8/4 Enterobacter, Citrobacter, and Serratia may develop resistance during prolonged therapy (3-4 days)   - Ampicillin/Sulbactam: R >16/8   - Aztreonam: S <=4   - Aztreonam: S <=4   - Cefazolin: R 16 Enterobacter, Citrobacter, and Serratia may develop resistance during prolonged therapy (3-4 days)   - Cefazolin: R >16   - Cefepime: S <=2   - Cefepime: S <=2   - Cefoxitin: S <=8   - Cefoxitin: S <=8   - Ceftazidime: S <=1   - Ceftriaxone: S <=1   - Ceftriaxone: S <=1 Enterobacter, Citrobacter, and Serratia may develop resistance during prolonged therapy   - Ciprofloxacin: S <=0.25   - Ciprofloxacin: S <=0.25   - Ertapenem: S <=0.5   - Gentamicin: S <=2   - Gentamicin: S <=2   - Imipenem: S <=1   - Levofloxacin: S <=0.5   - Levofloxacin: S <=0.5   - Meropenem: S <=1   - Meropenem: S <=1   - Piperacillin/Tazobactam: S <=8   - Piperacillin/Tazobactam: S <=8   - Tetra/Doxy: S 4   - Tobramycin: S <=2   - Trimethoprim/Sulfamethoxazole: S <=0.5/9.5   - Trimethoprim/Sulfamethoxazole: S <=0.5/9.5   - Vancomycin: S 2   Specimen Source: .Surgical Swab right foot wound   Culture Results:   Culture yields >4 types of aerobic and/or anaerobic bacteria   Call client services within 7 days if further workup is clinically   indicated. Culture includes   Rare Aeromonas hydrophila/caviae   Few Klebsiella oxytoca/Raoutella ornithinolytica   Few Enterococcus faecalis   Few Streptococcus agalactiae (Group B) isolated   Group B streptococci are susceptible to ampicillin,   penicillin and cefazolin, but may be resistant to   erythromycin and clindamycin.   Recommendations for intrapartum prophylaxis for Group B   streptococci are penicillin or ampicillin.   Organism Identification: Aeromonas hydrophila/caviae   Klebsiella oxytoca /Raoutella ornithinolytica   Enterococcus faecalis   Organism: Aeromonas hydrophila/caviae   Organism: Klebsiella oxytoca /Raoutella ornithinolytica   Organism: Enterococcus faecalis   COVID-19 PCR . (09.16.21 @ 22:24)   COVID-19 PCR: NotDetec:

## 2021-11-03 NOTE — PROGRESS NOTE ADULT - PROVIDER SPECIALTY LIST ADULT
Cardiology
Critical Care
Hepatology
Infectious Disease
Internal Medicine
Intervent Radiology
Nephrology
Pain Medicine
Pain Medicine
Podiatry
Thoracic Surgery
Thoracic Surgery
Cardiology
Critical Care
Hepatology
Infectious Disease
Internal Medicine
Nephrology
Podiatry
Surgery
Cardiology
Critical Care
Hepatology
Infectious Disease
Internal Medicine
Nephrology
Pain Medicine
Podiatry
Surgery
Thoracic Surgery
Critical Care
Infectious Disease
Nephrology
Podiatry
Surgery
Surgery
Thoracic Surgery
Internal Medicine
Internal Medicine
Pain Medicine
Pain Medicine
Internal Medicine
Internal Medicine
Podiatry
Internal Medicine
Internal Medicine
Podiatry
Podiatry
Internal Medicine
Pain Medicine
Internal Medicine
Podiatry
Internal Medicine

## 2021-11-03 NOTE — DISCHARGE NOTE NURSING/CASE MANAGEMENT/SOCIAL WORK - PATIENT PORTAL LINK FT
You can access the FollowMyHealth Patient Portal offered by [facility] by registering at the following website: [email or web address]. By joining [employer]’s FollowMyHealth portal, you will also be able to view your health information using other applications (apps) compatible with our system.

## 2021-11-06 LAB
CULTURE RESULTS: SIGNIFICANT CHANGE UP
SPECIMEN SOURCE: SIGNIFICANT CHANGE UP

## 2021-11-07 LAB
CULTURE RESULTS: SIGNIFICANT CHANGE UP
SPECIMEN SOURCE: SIGNIFICANT CHANGE UP

## 2021-11-27 LAB
CULTURE RESULTS: SIGNIFICANT CHANGE UP
SPECIMEN SOURCE: SIGNIFICANT CHANGE UP

## 2022-01-17 ENCOUNTER — INPATIENT (INPATIENT)
Facility: HOSPITAL | Age: 77
LOS: 23 days | Discharge: NURSING, REHAB OR LONG-TERM CARE | DRG: 871 | End: 2022-02-10
Attending: INTERNAL MEDICINE | Admitting: INTERNAL MEDICINE
Payer: COMMERCIAL

## 2022-01-17 VITALS — HEIGHT: 67 IN | WEIGHT: 130.73 LBS

## 2022-01-17 DIAGNOSIS — Z95.1 PRESENCE OF AORTOCORONARY BYPASS GRAFT: Chronic | ICD-10-CM

## 2022-01-17 DIAGNOSIS — N17.9 ACUTE KIDNEY FAILURE, UNSPECIFIED: ICD-10-CM

## 2022-01-17 DIAGNOSIS — Z98.89 OTHER SPECIFIED POSTPROCEDURAL STATES: Chronic | ICD-10-CM

## 2022-01-17 PROBLEM — H91.90 UNSPECIFIED HEARING LOSS, UNSPECIFIED EAR: Chronic | Status: ACTIVE | Noted: 2021-09-23

## 2022-01-17 LAB
ALBUMIN SERPL ELPH-MCNC: 1.9 G/DL — LOW (ref 3.5–5)
ALBUMIN SERPL ELPH-MCNC: 1.9 G/DL — LOW (ref 3.5–5)
ALP SERPL-CCNC: 109 U/L — SIGNIFICANT CHANGE UP (ref 40–120)
ALP SERPL-CCNC: 113 U/L — SIGNIFICANT CHANGE UP (ref 40–120)
ALT FLD-CCNC: 33 U/L DA — SIGNIFICANT CHANGE UP (ref 10–60)
ALT FLD-CCNC: 34 U/L DA — SIGNIFICANT CHANGE UP (ref 10–60)
ANION GAP SERPL CALC-SCNC: 12 MMOL/L — SIGNIFICANT CHANGE UP (ref 5–17)
ANION GAP SERPL CALC-SCNC: 16 MMOL/L — SIGNIFICANT CHANGE UP (ref 5–17)
APTT BLD: 34.1 SEC — SIGNIFICANT CHANGE UP (ref 27.5–35.5)
AST SERPL-CCNC: 74 U/L — HIGH (ref 10–40)
AST SERPL-CCNC: 86 U/L — HIGH (ref 10–40)
BASE EXCESS BLDV CALC-SCNC: -9.2 MMOL/L — SIGNIFICANT CHANGE UP
BASOPHILS # BLD AUTO: 0.03 K/UL — SIGNIFICANT CHANGE UP (ref 0–0.2)
BASOPHILS NFR BLD AUTO: 0.2 % — SIGNIFICANT CHANGE UP (ref 0–2)
BILIRUB SERPL-MCNC: 0.6 MG/DL — SIGNIFICANT CHANGE UP (ref 0.2–1.2)
BILIRUB SERPL-MCNC: 0.6 MG/DL — SIGNIFICANT CHANGE UP (ref 0.2–1.2)
BUN SERPL-MCNC: 51 MG/DL — HIGH (ref 7–18)
BUN SERPL-MCNC: 51 MG/DL — HIGH (ref 7–18)
CALCIUM SERPL-MCNC: 7.4 MG/DL — LOW (ref 8.4–10.5)
CALCIUM SERPL-MCNC: 7.8 MG/DL — LOW (ref 8.4–10.5)
CHLORIDE SERPL-SCNC: 103 MMOL/L — SIGNIFICANT CHANGE UP (ref 96–108)
CHLORIDE SERPL-SCNC: 107 MMOL/L — SIGNIFICANT CHANGE UP (ref 96–108)
CO2 SERPL-SCNC: 17 MMOL/L — LOW (ref 22–31)
CO2 SERPL-SCNC: 18 MMOL/L — LOW (ref 22–31)
CREAT SERPL-MCNC: 4.37 MG/DL — HIGH (ref 0.5–1.3)
CREAT SERPL-MCNC: 4.45 MG/DL — HIGH (ref 0.5–1.3)
EOSINOPHIL # BLD AUTO: 0.32 K/UL — SIGNIFICANT CHANGE UP (ref 0–0.5)
EOSINOPHIL NFR BLD AUTO: 2.5 % — SIGNIFICANT CHANGE UP (ref 0–6)
GLUCOSE BLDC GLUCOMTR-MCNC: 178 MG/DL — HIGH (ref 70–99)
GLUCOSE SERPL-MCNC: 109 MG/DL — HIGH (ref 70–99)
GLUCOSE SERPL-MCNC: 130 MG/DL — HIGH (ref 70–99)
HCO3 BLDV-SCNC: 18 MMOL/L — LOW (ref 22–29)
HCT VFR BLD CALC: 33.6 % — LOW (ref 39–50)
HGB BLD-MCNC: 10.6 G/DL — LOW (ref 13–17)
IMM GRANULOCYTES NFR BLD AUTO: 0.5 % — SIGNIFICANT CHANGE UP (ref 0–1.5)
INR BLD: 1.6 RATIO — HIGH (ref 0.88–1.16)
LACTATE SERPL-SCNC: 1.4 MMOL/L — SIGNIFICANT CHANGE UP (ref 0.7–2)
LYMPHOCYTES # BLD AUTO: 1.2 K/UL — SIGNIFICANT CHANGE UP (ref 1–3.3)
LYMPHOCYTES # BLD AUTO: 9.5 % — LOW (ref 13–44)
MCHC RBC-ENTMCNC: 28.3 PG — SIGNIFICANT CHANGE UP (ref 27–34)
MCHC RBC-ENTMCNC: 31.5 GM/DL — LOW (ref 32–36)
MCV RBC AUTO: 89.8 FL — SIGNIFICANT CHANGE UP (ref 80–100)
MONOCYTES # BLD AUTO: 0.81 K/UL — SIGNIFICANT CHANGE UP (ref 0–0.9)
MONOCYTES NFR BLD AUTO: 6.4 % — SIGNIFICANT CHANGE UP (ref 2–14)
NEUTROPHILS # BLD AUTO: 10.23 K/UL — HIGH (ref 1.8–7.4)
NEUTROPHILS NFR BLD AUTO: 80.9 % — HIGH (ref 43–77)
NRBC # BLD: 0 /100 WBCS — SIGNIFICANT CHANGE UP (ref 0–0)
PCO2 BLDV: 40 MMHG — LOW (ref 42–55)
PH BLDV: 7.25 — LOW (ref 7.32–7.43)
PLATELET # BLD AUTO: 258 K/UL — SIGNIFICANT CHANGE UP (ref 150–400)
PO2 BLDV: 44 MMHG — SIGNIFICANT CHANGE UP
POTASSIUM SERPL-MCNC: 3.2 MMOL/L — LOW (ref 3.5–5.3)
POTASSIUM SERPL-MCNC: 3.5 MMOL/L — SIGNIFICANT CHANGE UP (ref 3.5–5.3)
POTASSIUM SERPL-SCNC: 3.2 MMOL/L — LOW (ref 3.5–5.3)
POTASSIUM SERPL-SCNC: 3.5 MMOL/L — SIGNIFICANT CHANGE UP (ref 3.5–5.3)
PROT SERPL-MCNC: 6.1 G/DL — SIGNIFICANT CHANGE UP (ref 6–8.3)
PROT SERPL-MCNC: 6.3 G/DL — SIGNIFICANT CHANGE UP (ref 6–8.3)
PROTHROM AB SERPL-ACNC: 18.6 SEC — HIGH (ref 10.6–13.6)
RBC # BLD: 3.74 M/UL — LOW (ref 4.2–5.8)
RBC # FLD: 15.9 % — HIGH (ref 10.3–14.5)
SAO2 % BLDV: 70.1 % — SIGNIFICANT CHANGE UP
SARS-COV-2 RNA SPEC QL NAA+PROBE: SIGNIFICANT CHANGE UP
SODIUM SERPL-SCNC: 136 MMOL/L — SIGNIFICANT CHANGE UP (ref 135–145)
SODIUM SERPL-SCNC: 137 MMOL/L — SIGNIFICANT CHANGE UP (ref 135–145)
WBC # BLD: 12.65 K/UL — HIGH (ref 3.8–10.5)
WBC # FLD AUTO: 12.65 K/UL — HIGH (ref 3.8–10.5)

## 2022-01-17 PROCEDURE — 71045 X-RAY EXAM CHEST 1 VIEW: CPT | Mod: 26,77

## 2022-01-17 PROCEDURE — 70450 CT HEAD/BRAIN W/O DYE: CPT | Mod: 26,MA

## 2022-01-17 PROCEDURE — 71045 X-RAY EXAM CHEST 1 VIEW: CPT | Mod: 26

## 2022-01-17 PROCEDURE — 99285 EMERGENCY DEPT VISIT HI MDM: CPT

## 2022-01-17 RX ORDER — PIPERACILLIN AND TAZOBACTAM 4; .5 G/20ML; G/20ML
3.38 INJECTION, POWDER, LYOPHILIZED, FOR SOLUTION INTRAVENOUS EVERY 12 HOURS
Refills: 0 | Status: DISCONTINUED | OUTPATIENT
Start: 2022-01-17 | End: 2022-01-23

## 2022-01-17 RX ORDER — SODIUM CHLORIDE 9 MG/ML
10 INJECTION INTRAMUSCULAR; INTRAVENOUS; SUBCUTANEOUS
Refills: 0 | Status: DISCONTINUED | OUTPATIENT
Start: 2022-01-17 | End: 2022-01-20

## 2022-01-17 RX ORDER — SODIUM CHLORIDE 9 MG/ML
2000 INJECTION INTRAMUSCULAR; INTRAVENOUS; SUBCUTANEOUS ONCE
Refills: 0 | Status: COMPLETED | OUTPATIENT
Start: 2022-01-17 | End: 2022-01-17

## 2022-01-17 RX ORDER — CHLORHEXIDINE GLUCONATE 213 G/1000ML
1 SOLUTION TOPICAL DAILY
Refills: 0 | Status: DISCONTINUED | OUTPATIENT
Start: 2022-01-17 | End: 2022-01-24

## 2022-01-17 RX ORDER — LINEZOLID 600 MG/300ML
INJECTION, SOLUTION INTRAVENOUS
Refills: 0 | Status: DISCONTINUED | OUTPATIENT
Start: 2022-01-17 | End: 2022-01-21

## 2022-01-17 RX ORDER — CEFEPIME 1 G/1
INJECTION, POWDER, FOR SOLUTION INTRAMUSCULAR; INTRAVENOUS
Refills: 0 | Status: DISCONTINUED | OUTPATIENT
Start: 2022-01-17 | End: 2022-01-17

## 2022-01-17 RX ORDER — SODIUM CHLORIDE 9 MG/ML
1000 INJECTION INTRAMUSCULAR; INTRAVENOUS; SUBCUTANEOUS ONCE
Refills: 0 | Status: COMPLETED | OUTPATIENT
Start: 2022-01-17 | End: 2022-01-17

## 2022-01-17 RX ORDER — SODIUM CHLORIDE 9 MG/ML
1000 INJECTION, SOLUTION INTRAVENOUS
Refills: 0 | Status: DISCONTINUED | OUTPATIENT
Start: 2022-01-17 | End: 2022-01-18

## 2022-01-17 RX ORDER — INSULIN LISPRO 100/ML
VIAL (ML) SUBCUTANEOUS
Refills: 0 | Status: DISCONTINUED | OUTPATIENT
Start: 2022-01-17 | End: 2022-01-18

## 2022-01-17 RX ORDER — SODIUM CHLORIDE 9 MG/ML
1000 INJECTION INTRAMUSCULAR; INTRAVENOUS; SUBCUTANEOUS
Refills: 0 | Status: DISCONTINUED | OUTPATIENT
Start: 2022-01-17 | End: 2022-01-17

## 2022-01-17 RX ORDER — LINEZOLID 600 MG/300ML
600 INJECTION, SOLUTION INTRAVENOUS ONCE
Refills: 0 | Status: COMPLETED | OUTPATIENT
Start: 2022-01-17 | End: 2022-01-17

## 2022-01-17 RX ORDER — NOREPINEPHRINE BITARTRATE/D5W 8 MG/250ML
0.5 PLASTIC BAG, INJECTION (ML) INTRAVENOUS
Qty: 16 | Refills: 0 | Status: DISCONTINUED | OUTPATIENT
Start: 2022-01-17 | End: 2022-01-17

## 2022-01-17 RX ORDER — LANOLIN ALCOHOL/MO/W.PET/CERES
5 CREAM (GRAM) TOPICAL ONCE
Refills: 0 | Status: COMPLETED | OUTPATIENT
Start: 2022-01-17 | End: 2022-01-17

## 2022-01-17 RX ORDER — PANTOPRAZOLE SODIUM 20 MG/1
40 TABLET, DELAYED RELEASE ORAL
Refills: 0 | Status: DISCONTINUED | OUTPATIENT
Start: 2022-01-17 | End: 2022-02-10

## 2022-01-17 RX ORDER — CHLORHEXIDINE GLUCONATE 213 G/1000ML
1 SOLUTION TOPICAL
Refills: 0 | Status: DISCONTINUED | OUTPATIENT
Start: 2022-01-17 | End: 2022-01-17

## 2022-01-17 RX ORDER — HEPARIN SODIUM 5000 [USP'U]/ML
5000 INJECTION INTRAVENOUS; SUBCUTANEOUS EVERY 8 HOURS
Refills: 0 | Status: DISCONTINUED | OUTPATIENT
Start: 2022-01-17 | End: 2022-01-17

## 2022-01-17 RX ORDER — ATORVASTATIN CALCIUM 80 MG/1
40 TABLET, FILM COATED ORAL AT BEDTIME
Refills: 0 | Status: DISCONTINUED | OUTPATIENT
Start: 2022-01-17 | End: 2022-02-10

## 2022-01-17 RX ORDER — PHENYLEPHRINE HYDROCHLORIDE 10 MG/ML
0.2 INJECTION INTRAVENOUS
Qty: 40 | Refills: 0 | Status: DISCONTINUED | OUTPATIENT
Start: 2022-01-17 | End: 2022-01-17

## 2022-01-17 RX ORDER — NOREPINEPHRINE BITARTRATE/D5W 8 MG/250ML
0.25 PLASTIC BAG, INJECTION (ML) INTRAVENOUS
Qty: 16 | Refills: 0 | Status: DISCONTINUED | OUTPATIENT
Start: 2022-01-17 | End: 2022-01-18

## 2022-01-17 RX ORDER — FINASTERIDE 5 MG/1
5 TABLET, FILM COATED ORAL DAILY
Refills: 0 | Status: DISCONTINUED | OUTPATIENT
Start: 2022-01-17 | End: 2022-02-10

## 2022-01-17 RX ORDER — LINEZOLID 600 MG/300ML
600 INJECTION, SOLUTION INTRAVENOUS EVERY 12 HOURS
Refills: 0 | Status: DISCONTINUED | OUTPATIENT
Start: 2022-01-18 | End: 2022-01-21

## 2022-01-17 RX ORDER — ROSUVASTATIN CALCIUM 5 MG/1
1 TABLET ORAL
Qty: 0 | Refills: 0 | DISCHARGE

## 2022-01-17 RX ORDER — VANCOMYCIN HCL 1 G
750 VIAL (EA) INTRAVENOUS
Refills: 0 | Status: DISCONTINUED | OUTPATIENT
Start: 2022-01-17 | End: 2022-01-17

## 2022-01-17 RX ORDER — CEFEPIME 1 G/1
1000 INJECTION, POWDER, FOR SOLUTION INTRAMUSCULAR; INTRAVENOUS ONCE
Refills: 0 | Status: COMPLETED | OUTPATIENT
Start: 2022-01-17 | End: 2022-01-17

## 2022-01-17 RX ORDER — SODIUM CHLORIDE 9 MG/ML
1000 INJECTION, SOLUTION INTRAVENOUS
Refills: 0 | Status: COMPLETED | OUTPATIENT
Start: 2022-01-17 | End: 2022-01-17

## 2022-01-17 RX ORDER — ASPIRIN/CALCIUM CARB/MAGNESIUM 324 MG
81 TABLET ORAL DAILY
Refills: 0 | Status: DISCONTINUED | OUTPATIENT
Start: 2022-01-17 | End: 2022-02-10

## 2022-01-17 RX ORDER — PIPERACILLIN AND TAZOBACTAM 4; .5 G/20ML; G/20ML
3.38 INJECTION, POWDER, LYOPHILIZED, FOR SOLUTION INTRAVENOUS ONCE
Refills: 0 | Status: COMPLETED | OUTPATIENT
Start: 2022-01-17 | End: 2022-01-17

## 2022-01-17 RX ORDER — APIXABAN 2.5 MG/1
2.5 TABLET, FILM COATED ORAL
Refills: 0 | Status: DISCONTINUED | OUTPATIENT
Start: 2022-01-17 | End: 2022-01-31

## 2022-01-17 RX ORDER — GABAPENTIN 400 MG/1
100 CAPSULE ORAL THREE TIMES A DAY
Refills: 0 | Status: DISCONTINUED | OUTPATIENT
Start: 2022-01-17 | End: 2022-01-20

## 2022-01-17 RX ORDER — INSULIN GLARGINE 100 [IU]/ML
7 INJECTION, SOLUTION SUBCUTANEOUS AT BEDTIME
Refills: 0 | Status: DISCONTINUED | OUTPATIENT
Start: 2022-01-17 | End: 2022-01-18

## 2022-01-17 RX ADMIN — GABAPENTIN 100 MILLIGRAM(S): 400 CAPSULE ORAL at 22:39

## 2022-01-17 RX ADMIN — SODIUM CHLORIDE 1000 MILLILITER(S): 9 INJECTION INTRAMUSCULAR; INTRAVENOUS; SUBCUTANEOUS at 15:37

## 2022-01-17 RX ADMIN — SODIUM CHLORIDE 1000 MILLILITER(S): 9 INJECTION, SOLUTION INTRAVENOUS at 20:22

## 2022-01-17 RX ADMIN — SODIUM CHLORIDE 1000 MILLILITER(S): 9 INJECTION, SOLUTION INTRAVENOUS at 18:45

## 2022-01-17 RX ADMIN — SODIUM CHLORIDE 1000 MILLILITER(S): 9 INJECTION INTRAMUSCULAR; INTRAVENOUS; SUBCUTANEOUS at 14:06

## 2022-01-17 RX ADMIN — ATORVASTATIN CALCIUM 40 MILLIGRAM(S): 80 TABLET, FILM COATED ORAL at 22:39

## 2022-01-17 RX ADMIN — PIPERACILLIN AND TAZOBACTAM 200 GRAM(S): 4; .5 INJECTION, POWDER, LYOPHILIZED, FOR SOLUTION INTRAVENOUS at 21:24

## 2022-01-17 RX ADMIN — Medication 5 MILLIGRAM(S): at 22:58

## 2022-01-17 RX ADMIN — SODIUM CHLORIDE 2000 MILLILITER(S): 9 INJECTION INTRAMUSCULAR; INTRAVENOUS; SUBCUTANEOUS at 11:55

## 2022-01-17 RX ADMIN — CHLORHEXIDINE GLUCONATE 1 APPLICATION(S): 213 SOLUTION TOPICAL at 22:39

## 2022-01-17 RX ADMIN — CEFEPIME 100 MILLIGRAM(S): 1 INJECTION, POWDER, FOR SOLUTION INTRAMUSCULAR; INTRAVENOUS at 17:28

## 2022-01-17 RX ADMIN — Medication 81 MILLIGRAM(S): at 22:39

## 2022-01-17 RX ADMIN — Medication 250 MILLIGRAM(S): at 17:28

## 2022-01-17 RX ADMIN — APIXABAN 2.5 MILLIGRAM(S): 2.5 TABLET, FILM COATED ORAL at 22:39

## 2022-01-17 RX ADMIN — INSULIN GLARGINE 7 UNIT(S): 100 INJECTION, SOLUTION SUBCUTANEOUS at 22:58

## 2022-01-17 RX ADMIN — FINASTERIDE 5 MILLIGRAM(S): 5 TABLET, FILM COATED ORAL at 22:39

## 2022-01-17 RX ADMIN — LINEZOLID 300 MILLIGRAM(S): 600 INJECTION, SOLUTION INTRAVENOUS at 20:23

## 2022-01-17 RX ADMIN — Medication 28.5 MICROGRAM(S)/KG/MIN: at 20:22

## 2022-01-17 NOTE — H&P ADULT - ASSESSMENT
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 Patient received 3L bolus in ED, s/p Bp was still low 70/30s. ICU was consulted for evaluation. Patient is being admitted to ICU for septic shock requiring pressors.     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 Patient received 3L bolus in ED, s/p Bp was still low 70/30s. ICU was consulted for evaluation. Patient is being admitted to ICU for septic shock requiring pressors.     #Septic shock 2/2 ulcers vs UTI  #Delirium  #Acute renal failure  #Penile ulcer  #Sacral ulcer  #Diabetic foot ulcer  #Elevated INR  #Elevated LFTs    -NEURO  Patient was having AMS at nursing home, at present he is AOx3 but is lethargic.  Was likely delirium due to infection.  CTH showed  Moderate periventricular and deep white matter ischemia. Encephalomalacia and gliosis in RIGHT occipital lobe. Global atrophy. 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 Patient received 3L bolus in ED, s/p Bp was still low 70/30s. ICU was consulted for evaluation. Patient is being admitted to ICU for septic shock requiring pressors.     #Septic shock 2/2 ulcers vs UTI  #Delirium  #Acute renal failure  #Penile ulcer  #Sacral ulcer  #Diabetic foot ulcer  #Elevated INR  #Elevated LFTs    -NEURO  Patient was having AMS at nursing home, at present he is AOx3 but is lethargic.  Was likely delirium due to infection.  CTH showed  Moderate periventricular and deep white matter ischemia. Encephalomalacia and gliosis in RIGHT occipital lobe. Global atrophy. 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    -PULMONARY  No active issues at present    -CVS  #Septic shock likely 2/2 ulcers vs UTI  Patient presented with hypotension  Was given 3 L bolus in ED, BP was still low  Was started on peripheral pheny  Will start on central pressors.    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 Patient received 3L bolus in ED, s/p Bp was still low 70/30s. ICU was consulted for evaluation. Patient is being admitted to ICU for septic shock requiring pressors.     #Septic shock 2/2 ulcers vs UTI  #Delirium  #Acute renal failure  #Penile ulcer  #Sacral ulcer  #Diabetic foot ulcer  #Elevated INR  #Elevated LFTs    -NEURO  Patient was having AMS at nursing home, at present he is AOx3 but is lethargic.  Was likely delirium due to infection.  CTH showed  Moderate periventricular and deep white matter ischemia. Encephalomalacia and gliosis in RIGHT occipital lobe. Global atrophy. 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    -PULMONARY  No active issues at present    -CVS  #Septic shock likely 2/2 ulcers vs UTI  Patient presented with hypotension  Was given 3 L bolus in ED, BP was still low  Was started on peripheral pheny  Will start on central pressors after placing central line. Patient consented for central line access.   Will start on antibiotics, cefepime renally dosed.     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 Patient received 3L bolus in ED, s/p Bp was still low 70/30s. ICU was consulted for evaluation. Patient is being admitted to ICU for septic shock requiring pressors.     #Septic shock 2/2 ulcers vs UTI  #Delirium  #Acute renal failure  #Penile ulcer  #Sacral ulcer  #Diabetic foot ulcer  #Elevated INR  #Elevated LFTs    -NEURO  Patient was having AMS at nursing home, at present he is AOx3 but is lethargic.  Was likely delirium due to infection.  CTH showed  Moderate periventricular and deep white matter ischemia. Encephalomalacia and gliosis in RIGHT occipital lobe. Global atrophy. 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    -PULMONARY  No active issues at present    -CVS  #Septic shock likely 2/2 ulcers vs UTI  Patient presented with hypotension  Was given 3 L bolus in ED, BP was still low  Was started on peripheral pheny  Will start on central pressors after placing central line. Patient consented for central line access.   Will start on antibiotics, cefepime renally dosed.     -GI  Has elevated INR and AST  Likely 2/2 septic shock  Will monitor CMP  Regular diet    -RENAL  #Acute renal failure  Patient has normal creatinine at baseline, 1.18 Nov 2021  Presented with elevated BUN/cr of 51/4.45  Bladder scan showed no urine in the bladder  s/p 3L bolus, will start on 100cc standing fluids  Will monitor BMP  Nephro consult if creatinine is not improving after fluids and correction of shock    -ID  Septic shock 2/2 ulcers vs UTI  Patient has septic shock, unresponsive to fluids  Will start on cefepime and vanc   Will send in UA once he makes urine, can send urine cx if positive  f/u cx    -ENDO  DM  Patient has h/o IDDM, on 10 units of lantus at bedtime  Will start on HSS, lantus 7 units   Monitor ACHS    -SKIN  Patient has stage 3/4 sacral ulcer, penile ulcer, foot ulcer  Will do wound consult  Consulted Dr Garcia for foot ulcer  Started on cefepime and vancomycin renally dosed    -prophylactic  on eliquis   on ppi

## 2022-01-17 NOTE — H&P ADULT - NSICDXPASTMEDICALHX_GEN_ALL_CORE_FT
PAST MEDICAL HISTORY:  CAD (coronary artery disease)     DM (diabetes mellitus)     Glaucoma, angle-closure     HLD (hyperlipidemia)     [place] (hard of hearing)     HTN (hypertension)

## 2022-01-17 NOTE — H&P ADULT - ATTENDING COMMENTS
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 Patient received 3L bolus in ED, s/p Bp was still low 70/30s. ICU was consulted for evaluation. Patient is being admitted to ICU for septic shock requiring pressors.       - ASSESSMENT   - Septic shock 2/2 ulcers vs UTI  - Delirium / Encephalopathy   - Acute renal failure  - Penile ulcer  - Sacral ulcer  - Diabetic foot ulcer  - Elevated INR  - Elevated LFTs      Plan   - Admit to ICU   - O2 supp as needed   - Broad spec antibx   - Cultures   - Hemodynamic support  - Vasopressors to maintain MAP>65  - Wound care  - Podiatry eval Dr Garcia   - Monitor blood sugar with coverage   - IVF  - Monitor renal fx  - Molina's cath   - DVT GI prophy

## 2022-01-17 NOTE — ED PROVIDER NOTE - CLINICAL SUMMARY MEDICAL DECISION MAKING FREE TEXT BOX
Patient presenting with failure to thrive, hypotension. will obtain lab, ua, ct head. fluid hydrate. likely admit, per dr parker, patient hasn't been eating or drinking, getting worse despite iv fluid at NH

## 2022-01-17 NOTE — H&P ADULT - HISTORY OF PRESENT ILLNESS
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 Patient is alert and oriented on encounter. Patient states that he cut his foot and his back was hurting. Patient is unable to provide much history due to discomfort. Patient has stage 3 ulcers on sacrum, penile ulcer. Also has chronic wounds on foot. He was found to be hypotensive on field 60/30. He was given 3L bolus in ED after which BP was still low 70/30. Patient denies fever. As per AL records no h/o abdominal pain, nausea, vomiting, diarrhea, cough.

## 2022-01-17 NOTE — H&P ADULT - NSHPPHYSICALEXAM_GEN_ALL_CORE
Vital Signs (24 Hrs):  T(C): 36.5 (01-17-22 @ 11:36), Max: 36.5 (01-17-22 @ 11:36)  HR: 93 (01-17-22 @ 14:05) (81 - 93)  BP: 86/63 (01-17-22 @ 14:05) (86/63 - 92/63)  RR: 18 (01-17-22 @ 11:36) (18 - 18)  SpO2: 100% (01-17-22 @ 11:36) (100% - 100%)  Wt(kg): --  Daily Height in cm: 170.18 (17 Jan 2022 11:33)    Daily     I&O's Summary

## 2022-01-17 NOTE — ED PROVIDER NOTE - OBJECTIVE STATEMENT
76 y.o w/ pmh of afib, from [facility] presenting with hypotesion, decrase po intake x several days. per ems, nh staff stated patient normally aox3, today, aox0. hypotension noted on ems arrival.

## 2022-01-17 NOTE — ED ADULT NURSE NOTE - NSIMPLEMENTINTERV_GEN_ALL_ED
Implemented All Fall with Harm Risk Interventions:  [place] to call system. Call bell, personal items and telephone within reach. Instruct patient to call for assistance. Room bathroom lighting operational. Non-slip footwear when patient is off stretcher. Physically safe environment: no spills, clutter or unnecessary equipment. Stretcher in lowest position, wheels locked, appropriate side rails in place. Provide visual cue, wrist band, yellow gown, etc. Monitor gait and stability. Monitor for mental status changes and reorient to person, place, and time. Review medications for side effects contributing to fall risk. Reinforce activity limits and safety measures with patient and family. Provide visual clues: red socks.

## 2022-01-18 LAB
ALBUMIN SERPL ELPH-MCNC: 1.8 G/DL — LOW (ref 3.5–5)
ALP SERPL-CCNC: 108 U/L — SIGNIFICANT CHANGE UP (ref 40–120)
ALT FLD-CCNC: 31 U/L DA — SIGNIFICANT CHANGE UP (ref 10–60)
ANION GAP SERPL CALC-SCNC: 11 MMOL/L — SIGNIFICANT CHANGE UP (ref 5–17)
AST SERPL-CCNC: 93 U/L — HIGH (ref 10–40)
BILIRUB SERPL-MCNC: 0.5 MG/DL — SIGNIFICANT CHANGE UP (ref 0.2–1.2)
BUN SERPL-MCNC: 48 MG/DL — HIGH (ref 7–18)
CALCIUM SERPL-MCNC: 7.3 MG/DL — LOW (ref 8.4–10.5)
CHLORIDE SERPL-SCNC: 105 MMOL/L — SIGNIFICANT CHANGE UP (ref 96–108)
CO2 SERPL-SCNC: 20 MMOL/L — LOW (ref 22–31)
CREAT SERPL-MCNC: 3.8 MG/DL — HIGH (ref 0.5–1.3)
GLUCOSE BLDC GLUCOMTR-MCNC: 110 MG/DL — HIGH (ref 70–99)
GLUCOSE BLDC GLUCOMTR-MCNC: 115 MG/DL — HIGH (ref 70–99)
GLUCOSE BLDC GLUCOMTR-MCNC: 150 MG/DL — HIGH (ref 70–99)
GLUCOSE SERPL-MCNC: 120 MG/DL — HIGH (ref 70–99)
HCT VFR BLD CALC: 28.2 % — LOW (ref 39–50)
HGB BLD-MCNC: 9.2 G/DL — LOW (ref 13–17)
MAGNESIUM SERPL-MCNC: 1.9 MG/DL — SIGNIFICANT CHANGE UP (ref 1.6–2.6)
MCHC RBC-ENTMCNC: 29 PG — SIGNIFICANT CHANGE UP (ref 27–34)
MCHC RBC-ENTMCNC: 32.6 GM/DL — SIGNIFICANT CHANGE UP (ref 32–36)
MCV RBC AUTO: 89 FL — SIGNIFICANT CHANGE UP (ref 80–100)
NRBC # BLD: 0 /100 WBCS — SIGNIFICANT CHANGE UP (ref 0–0)
PHOSPHATE SERPL-MCNC: 4 MG/DL — SIGNIFICANT CHANGE UP (ref 2.5–4.5)
PLATELET # BLD AUTO: 288 K/UL — SIGNIFICANT CHANGE UP (ref 150–400)
POTASSIUM SERPL-MCNC: 3.1 MMOL/L — LOW (ref 3.5–5.3)
POTASSIUM SERPL-SCNC: 3.1 MMOL/L — LOW (ref 3.5–5.3)
PROT SERPL-MCNC: 5.9 G/DL — LOW (ref 6–8.3)
RBC # BLD: 3.17 M/UL — LOW (ref 4.2–5.8)
RBC # FLD: 15.9 % — HIGH (ref 10.3–14.5)
SODIUM SERPL-SCNC: 136 MMOL/L — SIGNIFICANT CHANGE UP (ref 135–145)
WBC # BLD: 11.76 K/UL — HIGH (ref 3.8–10.5)
WBC # FLD AUTO: 11.76 K/UL — HIGH (ref 3.8–10.5)

## 2022-01-18 PROCEDURE — 51702 INSERT TEMP BLADDER CATH: CPT

## 2022-01-18 RX ORDER — POTASSIUM CHLORIDE 20 MEQ
20 PACKET (EA) ORAL
Refills: 0 | Status: COMPLETED | OUTPATIENT
Start: 2022-01-18 | End: 2022-01-18

## 2022-01-18 RX ORDER — HYDROCORTISONE 20 MG
50 TABLET ORAL EVERY 6 HOURS
Refills: 0 | Status: DISCONTINUED | OUTPATIENT
Start: 2022-01-18 | End: 2022-01-19

## 2022-01-18 RX ORDER — NOREPINEPHRINE BITARTRATE/D5W 8 MG/250ML
0.5 PLASTIC BAG, INJECTION (ML) INTRAVENOUS
Qty: 16 | Refills: 0 | Status: ACTIVE | OUTPATIENT
Start: 2022-01-18 | End: 2022-12-17

## 2022-01-18 RX ORDER — OLANZAPINE 15 MG/1
2.5 TABLET, FILM COATED ORAL ONCE
Refills: 0 | Status: COMPLETED | OUTPATIENT
Start: 2022-01-18 | End: 2022-01-18

## 2022-01-18 RX ORDER — INSULIN LISPRO 100/ML
VIAL (ML) SUBCUTANEOUS EVERY 6 HOURS
Refills: 0 | Status: DISCONTINUED | OUTPATIENT
Start: 2022-01-18 | End: 2022-01-19

## 2022-01-18 RX ORDER — KETOROLAC TROMETHAMINE 30 MG/ML
30 SYRINGE (ML) INJECTION ONCE
Refills: 0 | Status: DISCONTINUED | OUTPATIENT
Start: 2022-01-18 | End: 2022-01-18

## 2022-01-18 RX ORDER — VASOPRESSIN 20 [USP'U]/ML
0.02 INJECTION INTRAVENOUS
Qty: 50 | Refills: 0 | Status: DISCONTINUED | OUTPATIENT
Start: 2022-01-18 | End: 2022-01-19

## 2022-01-18 RX ORDER — ACETAMINOPHEN 500 MG
1000 TABLET ORAL ONCE
Refills: 0 | Status: COMPLETED | OUTPATIENT
Start: 2022-01-18 | End: 2022-01-18

## 2022-01-18 RX ORDER — HYDROMORPHONE HYDROCHLORIDE 2 MG/ML
2 INJECTION INTRAMUSCULAR; INTRAVENOUS; SUBCUTANEOUS ONCE
Refills: 0 | Status: DISCONTINUED | OUTPATIENT
Start: 2022-01-18 | End: 2022-01-18

## 2022-01-18 RX ADMIN — OLANZAPINE 2.5 MILLIGRAM(S): 15 TABLET, FILM COATED ORAL at 00:44

## 2022-01-18 RX ADMIN — Medication 30 MILLIGRAM(S): at 05:00

## 2022-01-18 RX ADMIN — HYDROMORPHONE HYDROCHLORIDE 2 MILLIGRAM(S): 2 INJECTION INTRAMUSCULAR; INTRAVENOUS; SUBCUTANEOUS at 10:12

## 2022-01-18 RX ADMIN — Medication 100 MILLIEQUIVALENT(S): at 09:10

## 2022-01-18 RX ADMIN — LINEZOLID 300 MILLIGRAM(S): 600 INJECTION, SOLUTION INTRAVENOUS at 18:28

## 2022-01-18 RX ADMIN — Medication 100 MILLIEQUIVALENT(S): at 09:59

## 2022-01-18 RX ADMIN — ATORVASTATIN CALCIUM 40 MILLIGRAM(S): 80 TABLET, FILM COATED ORAL at 21:54

## 2022-01-18 RX ADMIN — LINEZOLID 300 MILLIGRAM(S): 600 INJECTION, SOLUTION INTRAVENOUS at 05:04

## 2022-01-18 RX ADMIN — HYDROMORPHONE HYDROCHLORIDE 2 MILLIGRAM(S): 2 INJECTION INTRAMUSCULAR; INTRAVENOUS; SUBCUTANEOUS at 11:12

## 2022-01-18 RX ADMIN — PIPERACILLIN AND TAZOBACTAM 25 GRAM(S): 4; .5 INJECTION, POWDER, LYOPHILIZED, FOR SOLUTION INTRAVENOUS at 17:28

## 2022-01-18 RX ADMIN — Medication 28.5 MICROGRAM(S)/KG/MIN: at 17:44

## 2022-01-18 RX ADMIN — Medication 1000 MILLIGRAM(S): at 01:15

## 2022-01-18 RX ADMIN — PIPERACILLIN AND TAZOBACTAM 25 GRAM(S): 4; .5 INJECTION, POWDER, LYOPHILIZED, FOR SOLUTION INTRAVENOUS at 06:04

## 2022-01-18 RX ADMIN — PANTOPRAZOLE SODIUM 40 MILLIGRAM(S): 20 TABLET, DELAYED RELEASE ORAL at 06:01

## 2022-01-18 RX ADMIN — GABAPENTIN 100 MILLIGRAM(S): 400 CAPSULE ORAL at 14:41

## 2022-01-18 RX ADMIN — Medication 400 MILLIGRAM(S): at 00:44

## 2022-01-18 RX ADMIN — GABAPENTIN 100 MILLIGRAM(S): 400 CAPSULE ORAL at 21:54

## 2022-01-18 RX ADMIN — CHLORHEXIDINE GLUCONATE 1 APPLICATION(S): 213 SOLUTION TOPICAL at 11:14

## 2022-01-18 RX ADMIN — Medication 28.5 MICROGRAM(S)/KG/MIN: at 09:09

## 2022-01-18 RX ADMIN — Medication 81 MILLIGRAM(S): at 11:13

## 2022-01-18 RX ADMIN — FINASTERIDE 5 MILLIGRAM(S): 5 TABLET, FILM COATED ORAL at 11:13

## 2022-01-18 RX ADMIN — SODIUM CHLORIDE 1000 MILLILITER(S): 9 INJECTION, SOLUTION INTRAVENOUS at 06:02

## 2022-01-18 RX ADMIN — VASOPRESSIN 1.2 UNIT(S)/MIN: 20 INJECTION INTRAVENOUS at 10:11

## 2022-01-18 RX ADMIN — APIXABAN 2.5 MILLIGRAM(S): 2.5 TABLET, FILM COATED ORAL at 06:01

## 2022-01-18 RX ADMIN — GABAPENTIN 100 MILLIGRAM(S): 400 CAPSULE ORAL at 05:04

## 2022-01-18 RX ADMIN — Medication 50 MILLIGRAM(S): at 11:14

## 2022-01-18 RX ADMIN — Medication 50 MILLIGRAM(S): at 17:28

## 2022-01-18 RX ADMIN — APIXABAN 2.5 MILLIGRAM(S): 2.5 TABLET, FILM COATED ORAL at 17:28

## 2022-01-18 RX ADMIN — Medication 30 MILLIGRAM(S): at 04:23

## 2022-01-18 NOTE — PROGRESS NOTE ADULT - SUBJECTIVE AND OBJECTIVE BOX
INTERVAL HPI/OVERNIGHT EVENTS: ***    PRESSORS: [ ] YES [ ] NO  WHICH:    ANTIBIOTICS:                  DATE STARTED:  ANTIBIOTICS:                  DATE STARTED:  ANTIBIOTICS:                  DATE STARTED:    Antimicrobial:  linezolid  IVPB 600 milliGRAM(s) IV Intermittent every 12 hours  linezolid  IVPB      piperacillin/tazobactam IVPB.. 3.375 Gram(s) IV Intermittent every 12 hours    Cardiovascular:  norepinephrine Infusion 0.5 MICROgram(s)/kG/Min IV Continuous <Continuous>    Pulmonary:    Hematalogic:  apixaban 2.5 milliGRAM(s) Oral two times a day  aspirin  chewable 81 milliGRAM(s) Oral daily    Other:  atorvastatin 40 milliGRAM(s) Oral at bedtime  chlorhexidine 2% Cloths 1 Application(s) Topical daily  finasteride 5 milliGRAM(s) Oral daily  gabapentin 100 milliGRAM(s) Oral three times a day  insulin glargine Injectable (LANTUS) 7 Unit(s) SubCutaneous at bedtime  insulin lispro (ADMELOG) corrective regimen sliding scale   SubCutaneous three times a day before meals  pantoprazole    Tablet 40 milliGRAM(s) Oral before breakfast  potassium chloride  20 mEq/100 mL IVPB 20 milliEquivalent(s) IV Intermittent every 2 hours  sodium chloride 0.9% lock flush 10 milliLiter(s) IV Push every 1 hour PRN    apixaban 2.5 milliGRAM(s) Oral two times a day  aspirin  chewable 81 milliGRAM(s) Oral daily  atorvastatin 40 milliGRAM(s) Oral at bedtime  chlorhexidine 2% Cloths 1 Application(s) Topical daily  finasteride 5 milliGRAM(s) Oral daily  gabapentin 100 milliGRAM(s) Oral three times a day  insulin glargine Injectable (LANTUS) 7 Unit(s) SubCutaneous at bedtime  insulin lispro (ADMELOG) corrective regimen sliding scale   SubCutaneous three times a day before meals  linezolid  IVPB 600 milliGRAM(s) IV Intermittent every 12 hours  linezolid  IVPB      norepinephrine Infusion 0.5 MICROgram(s)/kG/Min IV Continuous <Continuous>  pantoprazole    Tablet 40 milliGRAM(s) Oral before breakfast  piperacillin/tazobactam IVPB.. 3.375 Gram(s) IV Intermittent every 12 hours  potassium chloride  20 mEq/100 mL IVPB 20 milliEquivalent(s) IV Intermittent every 2 hours  sodium chloride 0.9% lock flush 10 milliLiter(s) IV Push every 1 hour PRN    Drug Dosing Weight  Height (cm): 170.2 (17 Jan 2022 11:33)  Weight (kg): 60.7 (17 Jan 2022 16:10)  BMI (kg/m2): 21 (17 Jan 2022 16:10)  BSA (m2): 1.7 (17 Jan 2022 16:10)    CENTRAL LINE: [ ] YES [ ] NO  LOCATION:         [place]: [ ] YES [ ] NO          A-LINE:  [ ] YES [ ] NO  LOCATION:             ICU Vital Signs Last 24 Hrs  T(C): 36.2 (18 Jan 2022 07:00), Max: 36.6 (17 Jan 2022 19:02)  T(F): 97.1 (18 Jan 2022 07:00), Max: 97.9 (17 Jan 2022 19:02)  HR: 110 (18 Jan 2022 08:00) (81 - 110)  BP: 101/64 (18 Jan 2022 08:00) (55/33 - 136/59)  BP(mean): 72 (18 Jan 2022 08:00) (38 - 81)  ABP: --  ABP(mean): --  RR: 13 (18 Jan 2022 08:00) (12 - 31)  SpO2: 96% (18 Jan 2022 08:00) (89% - 100%)            01-17 @ 07:01  -  01-18 @ 07:00  --------------------------------------------------------  IN: 3009.8 mL / OUT: 0 mL / NET: 3009.8 mL              PHYSICAL EXAM:    GENERAL: NAD  EYES: EOMI, PERRLA  NECK: Supple, No JVD; Normal thyroid; Trachea midline: No LAD   NERVOUS SYSTEM:  Alert & Oriented X3,  Motor Strength 5/5 B/L upper and lower extremities; DTRs 2+ intact and symmetric  CHEST/LUNG: No rales, rhonchi, wheezing, breath sounds present bilaterally  HEART: Regular rate and rhythm; No murmurs, no gallops  ABDOMEN: Soft, Nontender, Nondistended; Bowel sounds present, no pain or masses on palpation  : voiding well, Castañeda in place  EXTREMITIES:  2+ Peripheral Pulses, No clubbing, cyanosis, or edema  SKIN: warm, intact, no lesions         LABS:  CBC Full  -  ( 18 Jan 2022 03:56 )  WBC Count : 11.76 K/uL  RBC Count : 3.17 M/uL  Hemoglobin : 9.2 g/dL  Hematocrit : 28.2 %  Platelet Count - Automated : 288 K/uL  Mean Cell Volume : 89.0 fl  Mean Cell Hemoglobin : 29.0 pg  Mean Cell Hemoglobin Concentration : 32.6 gm/dL  Auto Neutrophil # : x  Auto Lymphocyte # : x  Auto Monocyte # : x  Auto Eosinophil # : x  Auto Basophil # : x  Auto Neutrophil % : x  Auto Lymphocyte % : x  Auto Monocyte % : x  Auto Eosinophil % : x  Auto Basophil % : x    01-18    136  |  105  |  48<H>  ----------------------------<  120<H>  3.1<L>   |  20<L>  |  3.80<H>    Ca    7.3<L>      18 Jan 2022 03:56  Phos  4.0     01-18  Mg     1.9     01-18    TPro  5.9<L>  /  Alb  1.8<L>  /  TBili  0.5  /  DBili  x   /  AST  93<H>  /  ALT  31  /  AlkPhos  108  01-18    PT/INR - ( 17 Jan 2022 12:12 )   PT: 18.6 sec;   INR: 1.60 ratio         PTT - ( 17 Jan 2022 12:12 )  PTT:34.1 sec        RADIOLOGY & ADDITIONAL STUDIES REVIEWED:  ***    [ ]GOALS OF CARE DISCUSSION WITH PATIENT/FAMILY/PROXY:    CRITICAL CARE TIME SPENT: 35 minutes   INTERVAL HPI/OVERNIGHT EVENTS: Patient received 6L of IVF, on pressors    PRESSORS: [x ] YES [ ] NO  WHICH: Levophed, added Vasopressin     Antimicrobial:  linezolid  IVPB 600 milliGRAM(s) IV Intermittent every 12 hours  linezolid  IVPB      piperacillin/tazobactam IVPB.. 3.375 Gram(s) IV Intermittent every 12 hours    Cardiovascular:  norepinephrine Infusion 0.5 MICROgram(s)/kG/Min IV Continuous <Continuous>    Pulmonary:    Hematalogic:  apixaban 2.5 milliGRAM(s) Oral two times a day  aspirin  chewable 81 milliGRAM(s) Oral daily    Other:  atorvastatin 40 milliGRAM(s) Oral at bedtime  chlorhexidine 2% Cloths 1 Application(s) Topical daily  finasteride 5 milliGRAM(s) Oral daily  gabapentin 100 milliGRAM(s) Oral three times a day  insulin glargine Injectable (LANTUS) 7 Unit(s) SubCutaneous at bedtime  insulin lispro (ADMELOG) corrective regimen sliding scale   SubCutaneous three times a day before meals  pantoprazole    Tablet 40 milliGRAM(s) Oral before breakfast  potassium chloride  20 mEq/100 mL IVPB 20 milliEquivalent(s) IV Intermittent every 2 hours  sodium chloride 0.9% lock flush 10 milliLiter(s) IV Push every 1 hour PRN    apixaban 2.5 milliGRAM(s) Oral two times a day  aspirin  chewable 81 milliGRAM(s) Oral daily  atorvastatin 40 milliGRAM(s) Oral at bedtime  chlorhexidine 2% Cloths 1 Application(s) Topical daily  finasteride 5 milliGRAM(s) Oral daily  gabapentin 100 milliGRAM(s) Oral three times a day  insulin glargine Injectable (LANTUS) 7 Unit(s) SubCutaneous at bedtime  insulin lispro (ADMELOG) corrective regimen sliding scale   SubCutaneous three times a day before meals  linezolid  IVPB 600 milliGRAM(s) IV Intermittent every 12 hours  linezolid  IVPB      norepinephrine Infusion 0.5 MICROgram(s)/kG/Min IV Continuous <Continuous>  pantoprazole    Tablet 40 milliGRAM(s) Oral before breakfast  piperacillin/tazobactam IVPB.. 3.375 Gram(s) IV Intermittent every 12 hours  potassium chloride  20 mEq/100 mL IVPB 20 milliEquivalent(s) IV Intermittent every 2 hours  sodium chloride 0.9% lock flush 10 milliLiter(s) IV Push every 1 hour PRN    Drug Dosing Weight  Height (cm): 170.2 (17 Jan 2022 11:33)  Weight (kg): 60.7 (17 Jan 2022 16:10)  BMI (kg/m2): 21 (17 Jan 2022 16:10)  BSA (m2): 1.7 (17 Jan 2022 16:10)    CENTRAL LINE: [ ] YES [ ] NO  LOCATION:         [place]: [ ] YES [ ] NO          A-LINE:  [ ] YES [ ] NO  LOCATION:             ICU Vital Signs Last 24 Hrs  T(C): 36.2 (18 Jan 2022 07:00), Max: 36.6 (17 Jan 2022 19:02)  T(F): 97.1 (18 Jan 2022 07:00), Max: 97.9 (17 Jan 2022 19:02)  HR: 110 (18 Jan 2022 08:00) (81 - 110)  BP: 101/64 (18 Jan 2022 08:00) (55/33 - 136/59)  BP(mean): 72 (18 Jan 2022 08:00) (38 - 81)  ABP: --  ABP(mean): --  RR: 13 (18 Jan 2022 08:00) (12 - 31)  SpO2: 96% (18 Jan 2022 08:00) (89% - 100%)            01-17 @ 07:01  -  01-18 @ 07:00  --------------------------------------------------------  IN: 3009.8 mL / OUT: 0 mL / NET: 3009.8 mL              PHYSICAL EXAM:    GENERAL: NAD, RIJ, cachectic, temporal wasting  EYES: EOMI, PERRLA  NECK: Supple, No JVD; Normal thyroid; Trachea midline: No LAD   NERVOUS SYSTEM:  Alert & Oriented X2-3,  Motor Strength 5/5 B/L upper and lower extremities; DTRs 2+ intact and symmetric  CHEST/LUNG: No rales, rhonchi, wheezing, breath sounds present bilaterally  HEART: Regular rate and rhythm; No murmurs, no gallops  ABDOMEN: Soft, Nontender, Nondistended; Bowel sounds present, no pain or masses on palpation  : no urine output, penile ulcers   EXTREMITIES:  2+ Peripheral Pulses, No clubbing, cyanosis, or edema  SKIN: warm, multiple skin ulcer on back, lower extremities and penile area         LABS:  CBC Full  -  ( 18 Jan 2022 03:56 )  WBC Count : 11.76 K/uL  RBC Count : 3.17 M/uL  Hemoglobin : 9.2 g/dL  Hematocrit : 28.2 %  Platelet Count - Automated : 288 K/uL  Mean Cell Volume : 89.0 fl  Mean Cell Hemoglobin : 29.0 pg  Mean Cell Hemoglobin Concentration : 32.6 gm/dL  Auto Neutrophil # : x  Auto Lymphocyte # : x  Auto Monocyte # : x  Auto Eosinophil # : x  Auto Basophil # : x  Auto Neutrophil % : x  Auto Lymphocyte % : x  Auto Monocyte % : x  Auto Eosinophil % : x  Auto Basophil % : x    01-18    136  |  105  |  48<H>  ----------------------------<  120<H>  3.1<L>   |  20<L>  |  3.80<H>    Ca    7.3<L>      18 Jan 2022 03:56  Phos  4.0     01-18  Mg     1.9     01-18    TPro  5.9<L>  /  Alb  1.8<L>  /  TBili  0.5  /  DBili  x   /  AST  93<H>  /  ALT  31  /  AlkPhos  108  01-18    PT/INR - ( 17 Jan 2022 12:12 )   PT: 18.6 sec;   INR: 1.60 ratio         PTT - ( 17 Jan 2022 12:12 )  PTT:34.1 sec        RADIOLOGY & ADDITIONAL STUDIES REVIEWED:  ***    [ ]GOALS OF CARE DISCUSSION WITH PATIENT/FAMILY/PROXY:    CRITICAL CARE TIME SPENT: 35 minutes   INTERVAL HPI/OVERNIGHT EVENTS: Patient received 6L of IVF, on pressors    PRESSORS: [x ] YES [ ] NO  WHICH: Levophed, added Vasopressin     Antimicrobial:  linezolid  IVPB 600 milliGRAM(s) IV Intermittent every 12 hours  linezolid  IVPB      piperacillin/tazobactam IVPB.. 3.375 Gram(s) IV Intermittent every 12 hours    Cardiovascular:  norepinephrine Infusion 0.5 MICROgram(s)/kG/Min IV Continuous <Continuous>    Pulmonary:    Hematalogic:  apixaban 2.5 milliGRAM(s) Oral two times a day  aspirin  chewable 81 milliGRAM(s) Oral daily    Other:  atorvastatin 40 milliGRAM(s) Oral at bedtime  chlorhexidine 2% Cloths 1 Application(s) Topical daily  finasteride 5 milliGRAM(s) Oral daily  gabapentin 100 milliGRAM(s) Oral three times a day  insulin glargine Injectable (LANTUS) 7 Unit(s) SubCutaneous at bedtime  insulin lispro (ADMELOG) corrective regimen sliding scale   SubCutaneous three times a day before meals  pantoprazole    Tablet 40 milliGRAM(s) Oral before breakfast  potassium chloride  20 mEq/100 mL IVPB 20 milliEquivalent(s) IV Intermittent every 2 hours  sodium chloride 0.9% lock flush 10 milliLiter(s) IV Push every 1 hour PRN    apixaban 2.5 milliGRAM(s) Oral two times a day  aspirin  chewable 81 milliGRAM(s) Oral daily  atorvastatin 40 milliGRAM(s) Oral at bedtime  chlorhexidine 2% Cloths 1 Application(s) Topical daily  finasteride 5 milliGRAM(s) Oral daily  gabapentin 100 milliGRAM(s) Oral three times a day  insulin glargine Injectable (LANTUS) 7 Unit(s) SubCutaneous at bedtime  insulin lispro (ADMELOG) corrective regimen sliding scale   SubCutaneous three times a day before meals  linezolid  IVPB 600 milliGRAM(s) IV Intermittent every 12 hours  linezolid  IVPB      norepinephrine Infusion 0.5 MICROgram(s)/kG/Min IV Continuous <Continuous>  pantoprazole    Tablet 40 milliGRAM(s) Oral before breakfast  piperacillin/tazobactam IVPB.. 3.375 Gram(s) IV Intermittent every 12 hours  potassium chloride  20 mEq/100 mL IVPB 20 milliEquivalent(s) IV Intermittent every 2 hours  sodium chloride 0.9% lock flush 10 milliLiter(s) IV Push every 1 hour PRN    Drug Dosing Weight  Height (cm): 170.2 (17 Jan 2022 11:33)  Weight (kg): 60.7 (17 Jan 2022 16:10)  BMI (kg/m2): 21 (17 Jan 2022 16:10)  BSA (m2): 1.7 (17 Jan 2022 16:10)    CENTRAL LINE: [ ] YES [ ] NO  LOCATION:         [place]: [ ] YES [ ] NO          A-LINE:  [ ] YES [ ] NO  LOCATION:             ICU Vital Signs Last 24 Hrs  T(C): 36.2 (18 Jan 2022 07:00), Max: 36.6 (17 Jan 2022 19:02)  T(F): 97.1 (18 Jan 2022 07:00), Max: 97.9 (17 Jan 2022 19:02)  HR: 110 (18 Jan 2022 08:00) (81 - 110)  BP: 101/64 (18 Jan 2022 08:00) (55/33 - 136/59)  BP(mean): 72 (18 Jan 2022 08:00) (38 - 81)  ABP: --  ABP(mean): --  RR: 13 (18 Jan 2022 08:00) (12 - 31)  SpO2: 96% (18 Jan 2022 08:00) (89% - 100%)            01-17 @ 07:01  -  01-18 @ 07:00  --------------------------------------------------------  IN: 3009.8 mL / OUT: 0 mL / NET: 3009.8 mL              PHYSICAL EXAM:    GENERAL: NAD, RIJ, cachectic, temporal wasting  EYES: EOMI, PERRLA  NECK: Supple, No JVD; Normal thyroid; Trachea midline: No LAD   NERVOUS SYSTEM:  Alert & Oriented X2-3,  Motor Strength 5/5 B/L upper and lower extremities; DTRs 2+ intact and symmetric  CHEST/LUNG: No rales, rhonchi, wheezing, breath sounds present bilaterally  HEART: Regular rate and rhythm; No murmurs, no gallops  ABDOMEN: Soft, Nontender, Nondistended; Bowel sounds present, no pain or masses on palpation  : no urine output, penile ulcer  EXTREMITIES:  2+ Peripheral Pulses, No clubbing, cyanosis, or edema  SKIN: warm, multiple skin ulcer on back, lower extremities and penile area         LABS:  CBC Full  -  ( 18 Jan 2022 03:56 )  WBC Count : 11.76 K/uL  RBC Count : 3.17 M/uL  Hemoglobin : 9.2 g/dL  Hematocrit : 28.2 %  Platelet Count - Automated : 288 K/uL  Mean Cell Volume : 89.0 fl  Mean Cell Hemoglobin : 29.0 pg  Mean Cell Hemoglobin Concentration : 32.6 gm/dL  Auto Neutrophil # : x  Auto Lymphocyte # : x  Auto Monocyte # : x  Auto Eosinophil # : x  Auto Basophil # : x  Auto Neutrophil % : x  Auto Lymphocyte % : x  Auto Monocyte % : x  Auto Eosinophil % : x  Auto Basophil % : x    01-18    136  |  105  |  48<H>  ----------------------------<  120<H>  3.1<L>   |  20<L>  |  3.80<H>    Ca    7.3<L>      18 Jan 2022 03:56  Phos  4.0     01-18  Mg     1.9     01-18    TPro  5.9<L>  /  Alb  1.8<L>  /  TBili  0.5  /  DBili  x   /  AST  93<H>  /  ALT  31  /  AlkPhos  108  01-18    PT/INR - ( 17 Jan 2022 12:12 )   PT: 18.6 sec;   INR: 1.60 ratio         PTT - ( 17 Jan 2022 12:12 )  PTT:34.1 sec        RADIOLOGY & ADDITIONAL STUDIES REVIEWED:  ***    [ ]GOALS OF CARE DISCUSSION WITH PATIENT/FAMILY/PROXY:    CRITICAL CARE TIME SPENT: 35 minutes   INTERVAL HPI/OVERNIGHT EVENTS: Patient received 6L of IVF, on pressors    PRESSORS: [x ] YES [ ] NO  WHICH: Levophed, added Vasopressin     Antimicrobial:  linezolid  IVPB 600 milliGRAM(s) IV Intermittent every 12 hours  linezolid  IVPB      piperacillin/tazobactam IVPB.. 3.375 Gram(s) IV Intermittent every 12 hours    Cardiovascular:  norepinephrine Infusion 0.5 MICROgram(s)/kG/Min IV Continuous <Continuous>    Pulmonary:    Hematalogic:  apixaban 2.5 milliGRAM(s) Oral two times a day  aspirin  chewable 81 milliGRAM(s) Oral daily    Other:  atorvastatin 40 milliGRAM(s) Oral at bedtime  chlorhexidine 2% Cloths 1 Application(s) Topical daily  finasteride 5 milliGRAM(s) Oral daily  gabapentin 100 milliGRAM(s) Oral three times a day  insulin glargine Injectable (LANTUS) 7 Unit(s) SubCutaneous at bedtime  insulin lispro (ADMELOG) corrective regimen sliding scale   SubCutaneous three times a day before meals  pantoprazole    Tablet 40 milliGRAM(s) Oral before breakfast  potassium chloride  20 mEq/100 mL IVPB 20 milliEquivalent(s) IV Intermittent every 2 hours  sodium chloride 0.9% lock flush 10 milliLiter(s) IV Push every 1 hour PRN    apixaban 2.5 milliGRAM(s) Oral two times a day  aspirin  chewable 81 milliGRAM(s) Oral daily  atorvastatin 40 milliGRAM(s) Oral at bedtime  chlorhexidine 2% Cloths 1 Application(s) Topical daily  finasteride 5 milliGRAM(s) Oral daily  gabapentin 100 milliGRAM(s) Oral three times a day  insulin glargine Injectable (LANTUS) 7 Unit(s) SubCutaneous at bedtime  insulin lispro (ADMELOG) corrective regimen sliding scale   SubCutaneous three times a day before meals  linezolid  IVPB 600 milliGRAM(s) IV Intermittent every 12 hours  linezolid  IVPB      norepinephrine Infusion 0.5 MICROgram(s)/kG/Min IV Continuous <Continuous>  pantoprazole    Tablet 40 milliGRAM(s) Oral before breakfast  piperacillin/tazobactam IVPB.. 3.375 Gram(s) IV Intermittent every 12 hours  potassium chloride  20 mEq/100 mL IVPB 20 milliEquivalent(s) IV Intermittent every 2 hours  sodium chloride 0.9% lock flush 10 milliLiter(s) IV Push every 1 hour PRN    Drug Dosing Weight  Height (cm): 170.2 (17 Jan 2022 11:33)  Weight (kg): 60.7 (17 Jan 2022 16:10)  BMI (kg/m2): 21 (17 Jan 2022 16:10)  BSA (m2): 1.7 (17 Jan 2022 16:10)    CENTRAL LINE: [ ] YES [ ] NO  LOCATION:         [place]: [ ] YES [ ] NO          A-LINE:  [ ] YES [ ] NO  LOCATION:             ICU Vital Signs Last 24 Hrs  T(C): 36.2 (18 Jan 2022 07:00), Max: 36.6 (17 Jan 2022 19:02)  T(F): 97.1 (18 Jan 2022 07:00), Max: 97.9 (17 Jan 2022 19:02)  HR: 110 (18 Jan 2022 08:00) (81 - 110)  BP: 101/64 (18 Jan 2022 08:00) (55/33 - 136/59)  BP(mean): 72 (18 Jan 2022 08:00) (38 - 81)  ABP: --  ABP(mean): --  RR: 13 (18 Jan 2022 08:00) (12 - 31)  SpO2: 96% (18 Jan 2022 08:00) (89% - 100%)            01-17 @ 07:01  -  01-18 @ 07:00  --------------------------------------------------------  IN: 3009.8 mL / OUT: 0 mL / NET: 3009.8 mL              PHYSICAL EXAM:    GENERAL: NAD, RIJ, cachectic, temporal wasting  EYES: EOMI, PERRLA  NECK: Supple, No JVD; Normal thyroid; Trachea midline: No LAD   NERVOUS SYSTEM:  Alert & Oriented X2-3,  Motor Strength 5/5 B/L upper and lower extremities; DTRs 2+ intact and symmetric  CHEST/LUNG: No rales, rhonchi, wheezing, breath sounds present bilaterally  HEART: Regular rate and rhythm; No murmurs, no gallops  ABDOMEN: Soft, Nontender, Nondistended; Bowel sounds present, no pain or masses on palpation  : Castañeda in place, penile ulcer  EXTREMITIES:  2+ Peripheral Pulses, No clubbing, cyanosis, or edema  SKIN: warm, multiple skin ulcer on back, lower extremities and penile area         LABS:  CBC Full  -  ( 18 Jan 2022 03:56 )  WBC Count : 11.76 K/uL  RBC Count : 3.17 M/uL  Hemoglobin : 9.2 g/dL  Hematocrit : 28.2 %  Platelet Count - Automated : 288 K/uL  Mean Cell Volume : 89.0 fl  Mean Cell Hemoglobin : 29.0 pg  Mean Cell Hemoglobin Concentration : 32.6 gm/dL  Auto Neutrophil # : x  Auto Lymphocyte # : x  Auto Monocyte # : x  Auto Eosinophil # : x  Auto Basophil # : x  Auto Neutrophil % : x  Auto Lymphocyte % : x  Auto Monocyte % : x  Auto Eosinophil % : x  Auto Basophil % : x    01-18    136  |  105  |  48<H>  ----------------------------<  120<H>  3.1<L>   |  20<L>  |  3.80<H>    Ca    7.3<L>      18 Jan 2022 03:56  Phos  4.0     01-18  Mg     1.9     01-18    TPro  5.9<L>  /  Alb  1.8<L>  /  TBili  0.5  /  DBili  x   /  AST  93<H>  /  ALT  31  /  AlkPhos  108  01-18    PT/INR - ( 17 Jan 2022 12:12 )   PT: 18.6 sec;   INR: 1.60 ratio         PTT - ( 17 Jan 2022 12:12 )  PTT:34.1 sec        RADIOLOGY & ADDITIONAL STUDIES REVIEWED:  ***    [ ]GOALS OF CARE DISCUSSION WITH PATIENT/FAMILY/PROXY:    CRITICAL CARE TIME SPENT: 35 minutes

## 2022-01-18 NOTE — PROCEDURE NOTE - NSPROCDETAILS_GEN_ALL_CORE
guidewire recovered/lumen(s) aspirated and flushed/sterile dressing applied/sterile technique, catheter placed/ultrasound guidance with use of sterile gel and probe cove
sterile technique, indwelling urinary device inserted

## 2022-01-18 NOTE — CONSULT NOTE ADULT - ASSESSMENT
Septic Shock - possible source could be his decubitus/ vascular ulcers but does not appear so.  Leukocytosis - mild    Plan - Cont Zyvox 600mgs iv q12hrs for now  Cont Zosyn 3.375gms iv q12hrs   await culture results.

## 2022-01-18 NOTE — CONSULT NOTE ADULT - SUBJECTIVE AND OBJECTIVE BOX
HPI:  75 yo male from 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 Patient is alert and oriented on encounter. Patient states that he cut his foot and his back was hurting. Patient is unable to provide much history due to discomfort. Patient has stage 3 ulcers on sacrum, penile ulcer. Also has chronic wounds on foot. He was found to be hypotensive on field 60/30. He was given 3L bolus in ED after which BP was still low 70/30. Patient denies fever. As per AL records no h/o abdominal pain, nausea, vomiting, diarrhea, cough.  (17 Jan 2022 15:23)      PAST MEDICAL & SURGICAL HISTORY:  HTN (hypertension)    HLD (hyperlipidemia)    DM (diabetes mellitus)    CAD (coronary artery disease)    Glaucoma, angle-closure    [place] (hard of hearing)    S/P CABG (coronary artery bypass graft)    History of thyroid surgery        No Known Allergies      Meds:  apixaban 2.5 milliGRAM(s) Oral two times a day  aspirin  chewable 81 milliGRAM(s) Oral daily  atorvastatin 40 milliGRAM(s) Oral at bedtime  chlorhexidine 2% Cloths 1 Application(s) Topical daily  finasteride 5 milliGRAM(s) Oral daily  gabapentin 100 milliGRAM(s) Oral three times a day  hydrocortisone sodium succinate Injectable 50 milliGRAM(s) IV Push every 6 hours  insulin lispro (ADMELOG) corrective regimen sliding scale   SubCutaneous every 6 hours  linezolid  IVPB 600 milliGRAM(s) IV Intermittent every 12 hours  linezolid  IVPB      norepinephrine Infusion 0.5 MICROgram(s)/kG/Min IV Continuous <Continuous>  pantoprazole    Tablet 40 milliGRAM(s) Oral before breakfast  piperacillin/tazobactam IVPB.. 3.375 Gram(s) IV Intermittent every 12 hours  sodium chloride 0.9% lock flush 10 milliLiter(s) IV Push every 1 hour PRN  vasopressin Infusion 0.02 Unit(s)/Min IV Continuous <Continuous>      SOCIAL HISTORY:  Smoker:  YES / NO        PACK YEARS:                         WHEN QUIT?  ETOH use:  YES / NO               FREQUENCY / QUANTITY:  [place] Drug use:  YES / NO  Occupation:  Assisted device use (Cane / Walker):  Live with:    FAMILY HISTORY:  Family history of acute myocardial infarction (Father)    Family history of diabetes mellitus (Mother, Sibling)    Family history of hypertension (Mother, Sibling)    Family history of hyperlipidemia (Mother, Sibling)        VITALS:  Vital Signs Last 24 Hrs  T(C): 36.1 (18 Jan 2022 17:05), Max: 36.6 (18 Jan 2022 00:27)  T(F): 97 (18 Jan 2022 17:05), Max: 97.9 (18 Jan 2022 00:27)  HR: 91 (18 Jan 2022 18:30) (87 - 116)  BP: 106/59 (18 Jan 2022 18:30) (55/33 - 136/59)  BP(mean): 71 (18 Jan 2022 18:30) (38 - 84)  RR: 12 (18 Jan 2022 18:30) (10 - 31)  SpO2: 97% (18 Jan 2022 18:30) (87% - 100%)    LABS/DIAGNOSTIC TESTS:                          9.2    11.76 )-----------( 288      ( 18 Jan 2022 03:56 )             28.2     WBC Count: 11.76 K/uL (01-18 @ 03:56)  WBC Count: 12.65 K/uL (01-17 @ 12:12)      01-18    136  |  105  |  48<H>  ----------------------------<  120<H>  3.1<L>   |  20<L>  |  3.80<H>    Ca    7.3<L>      18 Jan 2022 03:56  Phos  4.0     01-18  Mg     1.9     01-18    TPro  5.9<L>  /  Alb  1.8<L>  /  TBili  0.5  /  DBili  x   /  AST  93<H>  /  ALT  31  /  AlkPhos  108  01-18          LIVER FUNCTIONS - ( 18 Jan 2022 03:56 )  Alb: 1.8 g/dL / Pro: 5.9 g/dL / ALK PHOS: 108 U/L / ALT: 31 U/L DA / AST: 93 U/L / GGT: x             PT/INR - ( 17 Jan 2022 12:12 )   PT: 18.6 sec;   INR: 1.60 ratio         PTT - ( 17 Jan 2022 12:12 )  PTT:34.1 sec    LACTATE:    ABG -     CULTURES:   .Blood Blood-Peripheral  01-17 @ 17:49   No growth to date.  --  --      .Surgical Swab left foot wound  11-02 @ 13:15   Few Staphylococcus epidermidis  "Susceptibilities not performed"  --  --          RADIOLOGY:< from: Xray Chest 1 View-PORTABLE IMMEDIATE (Xray Chest 1 View-PORTABLE IMMEDIATE .) (01.17.22 @ 18:09) >  ACC: [id number] EXAM:  XR CHEST PORTABLE IMMED 1V                          PROCEDURE DATE:  01/17/2022          INTERPRETATION:  Chest one view    HISTORY: Line placement    COMPARISON STUDY: Earlier the same day    Frontal expiratory view of the chest shows the heart to be normal in   size. Right jugular line reaches the upper superior vena cava.    The lungs are clear and there is no evidence of pneumothorax nor pleural   effusion.    IMPRESSION:  No pneumothorax.        Thank you for the courtesy of this referral.    --- End of Report ---            REENA NORTON MD; Attending Interventional Radiologist  This document has been electronically signed. 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 Patient is alert and oriented on encounter. Patient states that he cut his foot and his back was hurting. Patient is unable to provide much history due to discomfort. Patient has stage 3 ulcers on sacrum, penile ulcer. Also has chronic wounds on foot. He was found to be hypotensive on field 60/30. He was given 3L bolus in ED after which BP was still low 70/30. Patient denies fever. As per AL records no h/o abdominal pain, nausea, vomiting, diarrhea, cough.  (17 Jan 2022 15:23)      History as above, asked to see this patient who presented from a NH with hypotension foot pain and is currently admitted to the ICU as he is on 2 pressors, he is a little confused but is answering all my questions but is slow to answer. He c/o some foot pain but no SOB, no chest pain, no cough, no urinary symptoms, he has no vomiting or diarrhea either, no fevers either. He was placed on Zyvox and Zosyn empirically.       PAST MEDICAL & SURGICAL HISTORY:  HTN (hypertension)    HLD (hyperlipidemia)    DM (diabetes mellitus)    CAD (coronary artery disease)    Glaucoma, angle-closure    [place] (hard of hearing)    S/P CABG (coronary artery bypass graft)    History of thyroid surgery        No Known Allergies      Meds:  apixaban 2.5 milliGRAM(s) Oral two times a day  aspirin  chewable 81 milliGRAM(s) Oral daily  atorvastatin 40 milliGRAM(s) Oral at bedtime  chlorhexidine 2% Cloths 1 Application(s) Topical daily  finasteride 5 milliGRAM(s) Oral daily  gabapentin 100 milliGRAM(s) Oral three times a day  hydrocortisone sodium succinate Injectable 50 milliGRAM(s) IV Push every 6 hours  insulin lispro (ADMELOG) corrective regimen sliding scale   SubCutaneous every 6 hours  linezolid  IVPB 600 milliGRAM(s) IV Intermittent every 12 hours  linezolid  IVPB      norepinephrine Infusion 0.5 MICROgram(s)/kG/Min IV Continuous <Continuous>  pantoprazole    Tablet 40 milliGRAM(s) Oral before breakfast  piperacillin/tazobactam IVPB.. 3.375 Gram(s) IV Intermittent every 12 hours  sodium chloride 0.9% lock flush 10 milliLiter(s) IV Push every 1 hour PRN  vasopressin Infusion 0.02 Unit(s)/Min IV Continuous <Continuous>      SOCIAL HISTORY:  Smoker:  ex smoker  ETOH use: denies      FAMILY HISTORY:  Family history of acute myocardial infarction (Father)    Family history of diabetes mellitus (Mother, Sibling)    Family history of hypertension (Mother, Sibling)    Family history of hyperlipidemia (Mother, Sibling)        VITALS:  Vital Signs Last 24 Hrs  T(C): 36.1 (18 Jan 2022 17:05), Max: 36.6 (18 Jan 2022 00:27)  T(F): 97 (18 Jan 2022 17:05), Max: 97.9 (18 Jan 2022 00:27)  HR: 91 (18 Jan 2022 18:30) (87 - 116)  BP: 106/59 (18 Jan 2022 18:30) (55/33 - 136/59)  BP(mean): 71 (18 Jan 2022 18:30) (38 - 84)  RR: 12 (18 Jan 2022 18:30) (10 - 31)  SpO2: 97% (18 Jan 2022 18:30) (87% - 100%)    LABS/DIAGNOSTIC TESTS:                          9.2    11.76 )-----------( 288      ( 18 Jan 2022 03:56 )             28.2     WBC Count: 11.76 K/uL (01-18 @ 03:56)  WBC Count: 12.65 K/uL (01-17 @ 12:12)      01-18    136  |  105  |  48<H>  ----------------------------<  120<H>  3.1<L>   |  20<L>  |  3.80<H>    Ca    7.3<L>      18 Jan 2022 03:56  Phos  4.0     01-18  Mg     1.9     01-18    TPro  5.9<L>  /  Alb  1.8<L>  /  TBili  0.5  /  DBili  x   /  AST  93<H>  /  ALT  31  /  AlkPhos  108  01-18          LIVER FUNCTIONS - ( 18 Jan 2022 03:56 )  Alb: 1.8 g/dL / Pro: 5.9 g/dL / ALK PHOS: 108 U/L / ALT: 31 U/L DA / AST: 93 U/L / GGT: x             PT/INR - ( 17 Jan 2022 12:12 )   PT: 18.6 sec;   INR: 1.60 ratio         PTT - ( 17 Jan 2022 12:12 )  PTT:34.1 sec    LACTATE:    ABG -     CULTURES:   .Blood Blood-Peripheral  01-17 @ 17:49   No growth to date.  --  --      .Surgical Swab left foot wound  11-02 @ 13:15   Few Staphylococcus epidermidis  "Susceptibilities not performed"  --  --          RADIOLOGY:< from: Xray Chest 1 View-PORTABLE IMMEDIATE (Xray Chest 1 View-PORTABLE IMMEDIATE .) (01.17.22 @ 18:09) >  ACC: [id number] EXAM:  XR CHEST PORTABLE IMMED 1V                          PROCEDURE DATE:  01/17/2022          INTERPRETATION:  Chest one view    HISTORY: Line placement    COMPARISON STUDY: Earlier the same day    Frontal expiratory view of the chest shows the heart to be normal in   size. Right jugular line reaches the upper superior vena cava.    The lungs are clear and there is no evidence of pneumothorax nor pleural   effusion.    IMPRESSION:  No pneumothorax.        Thank you for the courtesy of this referral.    --- End of Report ---            REENA NORTON MD; Attending Interventional Radiologist  This document has been electronically signed. Jan 18 2022 10:12AM    < end of copied text >        ROS  [  ] UNABLE TO ELICIT

## 2022-01-18 NOTE — CHART NOTE - NSCHARTNOTEFT_GEN_A_CORE
Spoke to patient's daughter Arabella and updated about her dad's condition about the septic shock and the source of the infection, and about the addition of another pressor and steroid today.

## 2022-01-18 NOTE — PROGRESS NOTE ADULT - ASSESSMENT
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 Patient received 3L bolus in ED, s/p Bp was still low 70/30s. ICU was consulted for evaluation. Patient is being admitted to ICU for septic shock requiring pressors.     #Septic shock 2/2 ulcers vs UTI  #Delirium  #Acute renal failure  #Penile ulcer  #Sacral ulcer  #Diabetic foot ulcer  #Elevated INR  #Elevated LFTs    -NEURO  Patient was having AMS at nursing home, at present he is AOx3 but is lethargic.  Was likely delirium due to infection.  CTH showed  Moderate periventricular and deep white matter ischemia. Encephalomalacia and gliosis in RIGHT occipital lobe. Global atrophy. 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    -PULMONARY  No active issues at present    -CVS  #Septic shock likely 2/2 ulcers vs UTI  Patient presented with hypotension  Was given 3 L bolus in ED, BP was still low  Was started on peripheral pheny  Will start on central pressors after placing central line. Patient consented for central line access.   Will start on antibiotics, cefepime renally dosed.     -GI  Has elevated INR and AST  Likely 2/2 septic shock  Will monitor CMP  Regular diet    -RENAL  #Acute renal failure  Patient has normal creatinine at baseline, 1.18 Nov 2021  Presented with elevated BUN/cr of 51/4.45  Bladder scan showed no urine in the bladder  s/p 3L bolus, will start on 100cc standing fluids  Will monitor BMP  Nephro consult if creatinine is not improving after fluids and correction of shock    -ID  Septic shock 2/2 ulcers vs UTI  Patient has septic shock, unresponsive to fluids  Will start on cefepime and vanc   Will send in UA once he makes urine, can send urine cx if positive  f/u cx    -ENDO  DM  Patient has h/o IDDM, on 10 units of lantus at bedtime  Will start on HSS, lantus 7 units   Monitor ACHS    -SKIN  Patient has stage 3/4 sacral ulcer, penile ulcer, foot ulcer  Will do wound consult  Consulted Dr Garcia for foot ulcer  Started on cefepime and vancomycin renally dosed    -prophylactic  on eliquis   on ppi   77 yo male from [facility] assisted living with medical history of DM on insulin, HTN, HLD, CAD S/p CABG, 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 Patient received 3L bolus in ED, s/p BP was still low 70/30s. Patient admitted to ICU for septic shock requiring pressors.     #Septic shock 2/2 ulcers vs UTI  #Delirium  #Acute renal failure  #Penile ulcer  #Sacral ulcer  #Diabetic foot ulcer  #Elevated INR  #Elevated LFTs    -NEURO  Patient AAOx3 but lethargic at times  Was likely delirium due to infection.  CTH showed  Moderate periventricular and deep white matter ischemia. Encephalomalacia and gliosis in RIGHT occipital lobe. Global atrophy. 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    -PULMONARY  No active issues at present    -CVS  #Septic shock likely 2/2 ulcers vs UTI  Patient presented with hypotension  Was given 3 L bolus in ED, BP was still low  C/w Levophed, added Vasopressin   Switched to Linezolid and Zosyn     -GI  Has elevated INR and AST  Likely 2/2 septic shock  Will monitor CMP  NPO, FS q6hrs     -RENAL  #Acute renal failure  Patient has normal creatinine at baseline, 1.18 Nov 2021  Presented with elevated BUN/cr of 51/4.45  Most likely ATN from hypoperfusion   S/p 6L bolus, No more fluids for now  Will monitor UO and BMP    -ID  Septic shock 2/2 ulcers vs UTI  Patient has septic shock 2/2 SSTI  Switched to Linezolid and Zosyn   Pending UA and Ucx     -ENDO  DM  Patient has h/o IDDM, on 10 units of Lantus at bedtime  C/w HSS, NPO, FS q6hrs    Monitor ACHS    -SKIN  Patient has stage 3/4 sacral ulcer, penile ulcer, foot ulcer  Wound consulted  Dr Garcia consulted  Switched to Linezolid and Zosyn     -Prophylactic measure  C/w Eliquis dose adjusted to 2.5 BID  C/w ppi   77 yo male from [facility] assisted living with medical history of DM on insulin, HTN, HLD, CAD S/p CABG, 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 Patient received 3L bolus in ED, s/p BP was still low 70/30s. Patient admitted to ICU for septic shock requiring pressors.     #Septic shock 2/2 ulcers vs UTI  #Delirium  #Acute renal failure  #Penile ulcer  #Sacral ulcer  #Diabetic foot ulcer  #Elevated INR  #Elevated LFTs    -NEURO  Patient AAOx3 but lethargic at times  Was likely delirium due to infection.  CTH showed  Moderate periventricular and deep white matter ischemia. Encephalomalacia and gliosis in RIGHT occipital lobe. Global atrophy. 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    -PULMONARY  No active issues at present    -CVS  #Septic shock likely 2/2 ulcers vs UTI  Patient presented with hypotension  Was given 3 L bolus in ED, BP was still low  S/p 6L of IVF total   C/w Levophed, added Vasopressin   Started on stress dose of Solucortef 50 mg q6 hrs   Switched to Linezolid and Zosyn   No more fluids for now, Will monitor UO     -GI  Has elevated INR and AST  Likely 2/2 septic shock  Will monitor CMP  NPO, FS q6hrs     -RENAL  #Acute renal failure  Patient has normal creatinine at baseline, 1.18 Nov 2021  Presented with elevated BUN/cr of 51/4.45  Most likely ATN from hypoperfusion   S/p 6L bolus, No more fluids for now  Will monitor UO and BMP    -ID  Septic shock 2/2 ulcers vs UTI  Patient has septic shock 2/2 SSTI  Switched to Linezolid and Zosyn   Pending UA and Ucx     -ENDO  DM  Patient has h/o IDDM, on 10 units of Lantus at bedtime  C/w HSS, NPO, FS q6hrs    Monitor ACHS    -SKIN  Patient has stage 3/4 sacral ulcer, penile ulcer, foot ulcer  Wound care consulted  Dr Garcia consulted  Switched to Linezolid and Zosyn     -Prophylactic measure  C/w Eliquis dose adjusted to 2.5 BID  C/w ppi      -GOC   Full Code  75 yo male from 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 Patient received 3L bolus in ED, s/p BP was still low 70/30s. Patient admitted to ICU for septic shock requiring pressors.     #Septic shock 2/2 ulcers vs UTI  #Delirium  #Acute renal failure  #Penile ulcer  #Sacral ulcer  #Diabetic foot ulcer  #Elevated INR  #Elevated LFTs    -NEURO  Patient AAOx3 but lethargic at times  Was likely delirium due to infection.  CTH showed  Moderate periventricular and deep white matter ischemia. Encephalomalacia and gliosis in RIGHT occipital lobe. Global atrophy. 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    -PULMONARY  No active issues at present    -CVS  #Septic shock likely 2/2 ulcers vs UTI  Patient presented with hypotension  Was given 3 L bolus in ED, BP was still low  S/p 6L of IVF total   C/w Levophed, added Vasopressin   Started on stress dose of Solucortef 50 mg q6 hrs   Switched to Linezolid and Zosyn   No more fluids for now, Will monitor UO   Pending UA and Ucx collection     -GI  Has elevated INR and AST  Likely 2/2 septic shock  Will monitor CMP  NPO, FS q6hrs     -RENAL  #Acute renal failure  Patient has normal creatinine at baseline, 1.18 Nov 2021  Presented with elevated BUN/cr of 51/4.45  Most likely ATN from hypoperfusion   S/p 6L bolus, No more fluids for now  Will monitor UO and BMP    -ID  Septic shock 2/2 ulcers vs UTI  Patient has septic shock 2/2 SSTI  Switched to Linezolid and Zosyn   Pending UA and Ucx collection     -ENDO  DM  Patient has h/o IDDM, on 10 units of Lantus at bedtime  C/w HSS, NPO, FS q6hrs    Monitor ACHS    -SKIN  Patient has stage 3/4 sacral ulcer, penile ulcer, foot ulcer  Wound care consulted  Dr Garcia consulted  Switched to Linezolid and Zosyn     -Prophylactic measure  C/w Eliquis dose adjusted to 2.5 BID  C/w ppi      -GOC   Full Code  75 yo male from [facility] assisted living with medical history of DM on insulin, HTN, HLD, CAD S/p CABG, 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 Patient received 3L bolus in ED, s/p BP was still low 70/30s. Patient admitted to ICU for septic shock requiring pressors.     #Septic shock 2/2 ulcers vs UTI  #Delirium  #Acute renal failure  #Penile ulcer  #Sacral ulcer  #Diabetic foot ulcer  #Elevated INR  #Elevated LFTs    -NEURO  Patient AAOx3 but lethargic at times  Was likely delirium due to infection.  CTH showed  Moderate periventricular and deep white matter ischemia. Encephalomalacia and gliosis in RIGHT occipital lobe. Global atrophy. 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    -PULMONARY  No active issues at present    -CVS  #Septic shock likely 2/2 ulcers vs UTI  Patient presented with hypotension  Was given 3 L bolus in ED, BP was still low  S/p 6L of IVF total   C/w Levophed, added Vasopressin   Started on stress dose of Solucortef 50 mg q6 hrs   Switched to Linezolid and Zosyn   No more fluids for now, Will monitor UO   Pending UA and Ucx collection   ID Dr Randhawa consulted     -GI  Has elevated INR and AST  Likely 2/2 septic shock  Will monitor CMP  NPO, FS q6hrs     -RENAL  #Acute renal failure  Patient has normal creatinine at baseline, 1.18 Nov 2021  Presented with elevated BUN/cr of 51/4.45  Most likely ATN from hypoperfusion   S/p 6L bolus, No more fluids for now  Will monitor UO and BMP    -ID  Septic shock 2/2 ulcers vs UTI  Patient has septic shock 2/2 SSTI  Switched to Linezolid and Zosyn   Pending UA and Ucx collection   ID Dr Randhawa consulted     -ENDO  DM  Patient has h/o IDDM, on 10 units of Lantus at bedtime  C/w HSS, NPO, FS q6hrs    Monitor ACHS    -SKIN  Patient has stage 3/4 sacral ulcer, penile ulcer, foot ulcer  Wound care consulted  Dr Garcia consulted  Switched to Linezolid and Zosyn  ID Dr Randhawa consulted    consulted      -Prophylactic measure  C/w Eliquis dose adjusted to 2.5 BID  C/w ppi      -GOC   Full Code  75 yo male from 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 Patient received 3L bolus in ED, s/p BP was still low 70/30s. Patient admitted to ICU for septic shock requiring pressors.     #Septic shock 2/2 ulcers vs UTI  #Delirium  #Acute renal failure  #Penile ulcer  #Sacral ulcer  #Diabetic foot ulcer  #Elevated INR  #Elevated LFTs    -NEURO  Patient AAOx3 but lethargic at times  Was likely delirium due to infection.  CTH showed  Moderate periventricular and deep white matter ischemia. Encephalomalacia and gliosis in RIGHT occipital lobe. Global atrophy. 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    -PULMONARY  No active issues at present    -CVS  #Septic shock likely 2/2 ulcers vs UTI  Patient presented with hypotension  Was given 3 L bolus in ED, BP was still low  S/p 6L of IVF total   C/w Levophed, added Vasopressin   Started on stress dose of Solucortef 50 mg q6 hrs   Switched to Linezolid and Zosyn   No more fluids for now, Will monitor UO   Pending UA and Ucx collection   ID Dr Randhawa consulted     #PAF  - Patient has hx of PAF on Eliquis 5 mg BID at home  - C/w Eliquis 2.5 mg BID for now while ATN resolves     -GI  Has elevated INR and AST  Likely 2/2 septic shock  Will monitor CMP  NPO, FS q6hrs     -RENAL  #Acute renal failure  Patient has normal creatinine at baseline, 1.18 Nov 2021  Presented with elevated BUN/cr of 51/4.45  Most likely ATN from hypoperfusion   S/p 6L bolus, No more fluids for now  Will monitor UO and BMP    -ID  Septic shock 2/2 ulcers vs UTI  Patient has septic shock 2/2 SSTI  Switched to Linezolid and Zosyn   Pending UA and Ucx collection   ID Dr Randhawa consulted     -ENDO  DM  Patient has h/o IDDM, on 10 units of Lantus at bedtime  C/w HSS, NPO, FS q6hrs    Monitor ACHS    -SKIN  Patient has stage 3/4 sacral ulcer, penile ulcer, foot ulcer  Switched to Linezolid and Zosyn  Wound care consulted  Dr Garcia consulted  ID Dr Randhawa consulted    consulted      -Prophylactic measure  C/w Eliquis dose adjusted to 2.5 BID  C/w ppi      -GOC   Full Code

## 2022-01-18 NOTE — PROCEDURE NOTE - ADDITIONAL PROCEDURE DETAILS
Urology called by primary care provider to insert ibrahim catheter.   Provider states that patient had chronic ibrahim catheter on admission to the ED yesterday, which was removed.  Provider stated the need for ibrahim catheter for strict intake and output and patients nurse had an unsuccessful attempt.     Sterile technique used, indwelling urinary device inserted.   Positive return of yellow urine in the collection bag.   No complications noted.     16 Wolof catheter placed.

## 2022-01-18 NOTE — PROCEDURE NOTE - NSINDICATIONS_GEN_A_CORE
critical illness/emergency venous access/hemodynamic monitoring
history of difficult urethral catheterization/strict intake/output during critical illness

## 2022-01-19 LAB
ALBUMIN SERPL ELPH-MCNC: 1.7 G/DL — LOW (ref 3.5–5)
ALP SERPL-CCNC: 107 U/L — SIGNIFICANT CHANGE UP (ref 40–120)
ALT FLD-CCNC: 28 U/L DA — SIGNIFICANT CHANGE UP (ref 10–60)
ANION GAP SERPL CALC-SCNC: 11 MMOL/L — SIGNIFICANT CHANGE UP (ref 5–17)
APPEARANCE UR: ABNORMAL
AST SERPL-CCNC: 71 U/L — HIGH (ref 10–40)
BACTERIA # UR AUTO: SIGNIFICANT CHANGE UP /HPF
BILIRUB SERPL-MCNC: 0.5 MG/DL — SIGNIFICANT CHANGE UP (ref 0.2–1.2)
BILIRUB UR-MCNC: NEGATIVE — SIGNIFICANT CHANGE UP
BUN SERPL-MCNC: 52 MG/DL — HIGH (ref 7–18)
CALCIUM SERPL-MCNC: 7.7 MG/DL — LOW (ref 8.4–10.5)
CHLORIDE SERPL-SCNC: 104 MMOL/L — SIGNIFICANT CHANGE UP (ref 96–108)
CO2 SERPL-SCNC: 19 MMOL/L — LOW (ref 22–31)
COLOR SPEC: YELLOW — SIGNIFICANT CHANGE UP
COMMENT - URINE: SIGNIFICANT CHANGE UP
COMMENT - URINE: SIGNIFICANT CHANGE UP
CREAT SERPL-MCNC: 4.01 MG/DL — HIGH (ref 0.5–1.3)
DIFF PNL FLD: ABNORMAL
EPI CELLS # UR: ABNORMAL /HPF
GLUCOSE BLDC GLUCOMTR-MCNC: 135 MG/DL — HIGH (ref 70–99)
GLUCOSE BLDC GLUCOMTR-MCNC: 151 MG/DL — HIGH (ref 70–99)
GLUCOSE BLDC GLUCOMTR-MCNC: 201 MG/DL — HIGH (ref 70–99)
GLUCOSE BLDC GLUCOMTR-MCNC: 220 MG/DL — HIGH (ref 70–99)
GLUCOSE SERPL-MCNC: 150 MG/DL — HIGH (ref 70–99)
GLUCOSE UR QL: NEGATIVE — SIGNIFICANT CHANGE UP
GRAN CASTS # UR COMP ASSIST: ABNORMAL /LPF
HCT VFR BLD CALC: 27.9 % — LOW (ref 39–50)
HGB BLD-MCNC: 9.2 G/DL — LOW (ref 13–17)
KETONES UR-MCNC: NEGATIVE — SIGNIFICANT CHANGE UP
LEUKOCYTE ESTERASE UR-ACNC: ABNORMAL
MAGNESIUM SERPL-MCNC: 1.9 MG/DL — SIGNIFICANT CHANGE UP (ref 1.6–2.6)
MCHC RBC-ENTMCNC: 29.1 PG — SIGNIFICANT CHANGE UP (ref 27–34)
MCHC RBC-ENTMCNC: 33 GM/DL — SIGNIFICANT CHANGE UP (ref 32–36)
MCV RBC AUTO: 88.3 FL — SIGNIFICANT CHANGE UP (ref 80–100)
MRSA PCR RESULT.: SIGNIFICANT CHANGE UP
NITRITE UR-MCNC: NEGATIVE — SIGNIFICANT CHANGE UP
NRBC # BLD: 0 /100 WBCS — SIGNIFICANT CHANGE UP (ref 0–0)
PH UR: 5 — SIGNIFICANT CHANGE UP (ref 5–8)
PHOSPHATE SERPL-MCNC: 4.3 MG/DL — SIGNIFICANT CHANGE UP (ref 2.5–4.5)
PLATELET # BLD AUTO: 231 K/UL — SIGNIFICANT CHANGE UP (ref 150–400)
POTASSIUM SERPL-MCNC: 3.8 MMOL/L — SIGNIFICANT CHANGE UP (ref 3.5–5.3)
POTASSIUM SERPL-SCNC: 3.8 MMOL/L — SIGNIFICANT CHANGE UP (ref 3.5–5.3)
PROT SERPL-MCNC: 5.7 G/DL — LOW (ref 6–8.3)
PROT UR-MCNC: 100
RBC # BLD: 3.16 M/UL — LOW (ref 4.2–5.8)
RBC # FLD: 16.3 % — HIGH (ref 10.3–14.5)
RBC CASTS # UR COMP ASSIST: >50 /HPF (ref 0–2)
S AUREUS DNA NOSE QL NAA+PROBE: DETECTED
SODIUM SERPL-SCNC: 134 MMOL/L — LOW (ref 135–145)
SP GR SPEC: 1.02 — SIGNIFICANT CHANGE UP (ref 1.01–1.02)
UROBILINOGEN FLD QL: NEGATIVE — SIGNIFICANT CHANGE UP
WBC # BLD: 10.87 K/UL — HIGH (ref 3.8–10.5)
WBC # FLD AUTO: 10.87 K/UL — HIGH (ref 3.8–10.5)
WBC UR QL: >50 /HPF (ref 0–5)

## 2022-01-19 RX ORDER — HYDROCORTISONE 20 MG
50 TABLET ORAL EVERY 8 HOURS
Refills: 0 | Status: DISCONTINUED | OUTPATIENT
Start: 2022-01-19 | End: 2022-01-20

## 2022-01-19 RX ORDER — VASOPRESSIN 20 [USP'U]/ML
0.04 INJECTION INTRAVENOUS
Qty: 50 | Refills: 0 | Status: DISCONTINUED | OUTPATIENT
Start: 2022-01-19 | End: 2022-01-21

## 2022-01-19 RX ORDER — INFLUENZA VIRUS VACCINE 15; 15; 15; 15 UG/.5ML; UG/.5ML; UG/.5ML; UG/.5ML
0.7 SUSPENSION INTRAMUSCULAR ONCE
Refills: 0 | Status: COMPLETED | OUTPATIENT
Start: 2022-01-19 | End: 2022-02-10

## 2022-01-19 RX ORDER — INSULIN LISPRO 100/ML
VIAL (ML) SUBCUTANEOUS
Refills: 0 | Status: DISCONTINUED | OUTPATIENT
Start: 2022-01-19 | End: 2022-01-30

## 2022-01-19 RX ADMIN — Medication 50 MILLIGRAM(S): at 00:50

## 2022-01-19 RX ADMIN — LINEZOLID 300 MILLIGRAM(S): 600 INJECTION, SOLUTION INTRAVENOUS at 05:28

## 2022-01-19 RX ADMIN — APIXABAN 2.5 MILLIGRAM(S): 2.5 TABLET, FILM COATED ORAL at 05:29

## 2022-01-19 RX ADMIN — Medication 50 MILLIGRAM(S): at 21:08

## 2022-01-19 RX ADMIN — PIPERACILLIN AND TAZOBACTAM 25 GRAM(S): 4; .5 INJECTION, POWDER, LYOPHILIZED, FOR SOLUTION INTRAVENOUS at 06:28

## 2022-01-19 RX ADMIN — GABAPENTIN 100 MILLIGRAM(S): 400 CAPSULE ORAL at 21:08

## 2022-01-19 RX ADMIN — PANTOPRAZOLE SODIUM 40 MILLIGRAM(S): 20 TABLET, DELAYED RELEASE ORAL at 06:00

## 2022-01-19 RX ADMIN — VASOPRESSIN 2.4 UNIT(S)/MIN: 20 INJECTION INTRAVENOUS at 11:56

## 2022-01-19 RX ADMIN — Medication 2: at 00:51

## 2022-01-19 RX ADMIN — Medication 0: at 17:05

## 2022-01-19 RX ADMIN — Medication 81 MILLIGRAM(S): at 11:08

## 2022-01-19 RX ADMIN — LINEZOLID 300 MILLIGRAM(S): 600 INJECTION, SOLUTION INTRAVENOUS at 18:18

## 2022-01-19 RX ADMIN — APIXABAN 2.5 MILLIGRAM(S): 2.5 TABLET, FILM COATED ORAL at 17:10

## 2022-01-19 RX ADMIN — GABAPENTIN 100 MILLIGRAM(S): 400 CAPSULE ORAL at 13:15

## 2022-01-19 RX ADMIN — PIPERACILLIN AND TAZOBACTAM 25 GRAM(S): 4; .5 INJECTION, POWDER, LYOPHILIZED, FOR SOLUTION INTRAVENOUS at 17:10

## 2022-01-19 RX ADMIN — Medication 4: at 11:07

## 2022-01-19 RX ADMIN — CHLORHEXIDINE GLUCONATE 1 APPLICATION(S): 213 SOLUTION TOPICAL at 11:09

## 2022-01-19 RX ADMIN — Medication 50 MILLIGRAM(S): at 13:16

## 2022-01-19 RX ADMIN — GABAPENTIN 100 MILLIGRAM(S): 400 CAPSULE ORAL at 05:29

## 2022-01-19 RX ADMIN — ATORVASTATIN CALCIUM 40 MILLIGRAM(S): 80 TABLET, FILM COATED ORAL at 21:08

## 2022-01-19 RX ADMIN — FINASTERIDE 5 MILLIGRAM(S): 5 TABLET, FILM COATED ORAL at 11:08

## 2022-01-19 RX ADMIN — Medication 4: at 21:21

## 2022-01-19 RX ADMIN — Medication 50 MILLIGRAM(S): at 05:31

## 2022-01-19 RX ADMIN — Medication 50 MILLIGRAM(S): at 11:08

## 2022-01-19 NOTE — PROGRESS NOTE ADULT - ASSESSMENT
75 yo male from 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 Patient received 3L bolus in ED, s/p BP was still low 70/30s. Patient admitted to ICU for septic shock requiring pressors.     #Septic shock 2/2 ulcers vs UTI  #Delirium  #Acute renal failure  #Penile ulcer  #Sacral ulcer  #Diabetic foot ulcer  #Elevated INR  #Elevated LFTs    -NEURO  Patient AAOx3  Lethargy was likely delirium due to infection, now resolving   CTH showed  Moderate periventricular and deep white matter ischemia. Encephalomalacia and gliosis in RIGHT occipital lobe. Global atrophy. 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    -PULMONARY  No active issues at present    -CVS  #Septic shock likely 2/2 ulcers vs UTI  Patient presented with hypotension  Was given 3 L bolus in ED, BP was still low  S/p 6L of IVF total   C/w Levophed, added Vasopressin   C/w stress dose of Solu-cortef 50 mg q8 hrs   Switched to Linezolid and Zosyn   No more fluids for now, Will monitor UO   UA +ve and Ucx testing  ID Dr Randhawa on board     -GI  Has elevated INR and AST  Likely 2/2 septic shock  Will monitor CMP  Diet Puree renal diet    -RENAL  #Acute renal failure  Patient has normal creatinine at baseline, 1.18 Nov 2021  Presented with elevated BUN/cr of 51/4.45  Most likely ATN from hypoperfusion   S/p 6L bolus, No more fluids for now  Will monitor UO and BMP    -ID  Septic shock 2/2 ulcers vs UTI  Patient has septic shock 2/2 SSTI  C/w Linezolid and Zosyn   UA +ve and Ucx testing  ID Dr Randhawa on board    -ENDO  DM  Patient has h/o IDDM, on 10 units of Lantus at bedtime  Diet Puree renal diet   Monitor ACHS    -SKIN  Patient has stage 3/4 sacral ulcer, penile ulcer, foot ulcer  Wound care consulted  Dr Garcia consulted  C/w Linezolid and Zosyn  ID Dr Randhawa on board   on board    -Prophylactic measure  C/w Eliquis dose adjusted to 2.5 BID  C/w ppi    -GOC   Full Code  77 yo male from [facility] assisted living with medical history of DM on insulin, HTN, HLD, CAD S/p CABG, 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 Patient received 3L bolus in ED, s/p BP was still low 70/30s. Patient admitted to ICU for septic shock requiring pressors.     #Septic shock 2/2 ulcers vs UTI  #Delirium  #Acute renal failure  #Penile ulcer  #Sacral ulcer  #Diabetic foot ulcer  #Elevated INR  #Elevated LFTs    -NEURO  Patient AAOx3  Lethargy was likely delirium due to infection, now resolving   CTH showed  Moderate periventricular and deep white matter ischemia. Encephalomalacia and gliosis in RIGHT occipital lobe. Global atrophy. 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    -PULMONARY  No active issues at present    -CVS  #Septic shock likely 2/2 ulcers vs UTI  Patient presented with hypotension  Was given 3 L bolus in ED, BP was still low  S/p 6L of IVF total   C/w Levophed, added Vasopressin   C/w stress dose of Solu-cortef 50 mg q8 hrs   Switched to Linezolid and Zosyn   No more fluids for now, Will monitor UO   UA +ve and Ucx testing  ID Dr Randhawa on board     -GI  Has elevated INR and AST  Likely 2/2 septic shock  Will monitor CMP  Diet Puree renal diet    -RENAL  #Acute renal failure  Patient has normal creatinine at baseline, 1.18 Nov 2021  Presented with elevated BUN/cr of 51/4.45  Most likely ATN from hypoperfusion   S/p 6L bolus, No more fluids for now  Will monitor UO and BMP    -ID  Septic shock 2/2 ulcers vs UTI  Patient has septic shock 2/2 SSTI  C/w Linezolid and Zosyn   UA +ve and Ucx testing  ID Dr Randhawa on board    -ENDO  DM  Patient has h/o IDDM, on 10 units of Lantus at bedtime  Diet Puree renal diet   Monitor ACHS    -SKIN  Patient has stage 3/4 sacral ulcer, penile ulcer, foot ulcer  Wound care on board  Dr Garcia consulted  C/w Linezolid and Zosyn  ID Dr Randhawa on board   on board    -Prophylactic measure  C/w Eliquis dose adjusted to 2.5 BID  C/w ppi    -GOC   Full Code  75 yo male from [facility] assisted living with medical history of DM on insulin, HTN, HLD, CAD S/p CABG, 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 Patient received 3L bolus in ED, s/p BP was still low 70/30s. Patient admitted to ICU for septic shock requiring pressors.     #Septic shock 2/2 ulcers vs UTI  #Delirium  #Acute renal failure  #Penile ulcer  #Sacral ulcer  #Diabetic foot ulcer  #Elevated INR  #Elevated LFTs    -NEURO  Patient AAOx3  Lethargy was likely delirium due to infection, now resolving   CTH showed  Moderate periventricular and deep white matter ischemia. Encephalomalacia and gliosis in RIGHT occipital lobe. Global atrophy. 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    -PULMONARY  No active issues at present    -CVS  #Septic shock likely 2/2 ulcers vs UTI  Patient presented with hypotension  Was given 3 L bolus in ED, BP was still low  S/p 6L of IVF total   C/w Levophed, added Vasopressin   C/w stress dose of Solu-cortef 50 mg q8 hrs   Switched to Linezolid and Zosyn   No more fluids for now, Will monitor UO   UA +ve and Ucx testing  ID Dr Randhawa on board     #PAF  - Patient has hx of PAF on Eliquis 5 mg BID at home  - C/w Eliquis 2.5 mg BID for now while ATN resolves     -GI  Has elevated INR and AST  Likely 2/2 septic shock  Will monitor CMP  Diet Puree renal diet    -RENAL  #Acute renal failure  Patient has normal creatinine at baseline, 1.18 Nov 2021  Presented with elevated BUN/cr of 51/4.45  Most likely ATN from hypoperfusion   S/p 6L bolus, No more fluids for now  Will monitor UO and BMP    -ID  Septic shock 2/2 ulcers vs UTI  Patient has septic shock 2/2 SSTI  C/w Linezolid and Zosyn   UA +ve and Ucx testing  ID Dr Randhawa on board    -ENDO  DM  Patient has h/o IDDM, on 10 units of Lantus at bedtime  Diet Puree renal diet   Monitor ACHS    -SKIN  Patient has stage 3/4 sacral ulcer, penile ulcer, foot ulcer  C/w Linezolid and Zosyn  Wound care on board  Dr Garcia consulted  ID Dr Randhawa on board   on board    -Prophylactic measure  C/w Eliquis dose adjusted to 2.5 BID  C/w ppi    -GOC   Full Code

## 2022-01-19 NOTE — DIETITIAN INITIAL EVALUATION ADULT. - PERTINENT LABORATORY DATA
01-19 Na134 mmol/L<L> Glu 150 mg/dL<H> K+ 3.8 mmol/L Cr  4.01 mg/dL<H> BUN 52 mg/dL<H>   01-19 Phos 4.3 mg/dL   01-19 Alb 1.7 g/dL<L>

## 2022-01-19 NOTE — ADVANCED PRACTICE NURSE CONSULT - ASSESSMENT
This is a 76yr old male patient admitted for Acute Renal Failure, presenting with the following:  -There is evidence of Arterial Ulcers to the Bilateral Lower Extremities, to which the patient is being followed by Podiatry with a treatment plan in place to address these issues  -There is a Stage 3 Pressure Injury to the Sacrococcyx area with red tissue, drainage, and periwound maceration  -There is a L. Hip Unstageable Pressure Injury with slough and drainage This is a 76yr old male patient admitted for Acute Renal Failure, presenting with the following:  -There is evidence of Arterial Ulcers to the Bilateral Lower Extremities, to which the patient is being followed by Podiatry with a treatment plan in place to address these issues  -There is a Stage 3 Pressure Injury to the Sacrococcyx area with red tissue, drainage, and periwound maceration  -There is a L. Hip Unstageable Pressure Injury with slough and drainage  -There is evidence of Scrotal epidermal erosion with pink tissue, drainage, and periwound maceration

## 2022-01-19 NOTE — PROGRESS NOTE ADULT - ASSESSMENT
Septic Shock - possible source could be his decubitus/ vascular ulcers but does not appear so.  Leukocytosis - mild    Plan - Cont Zyvox 600mgs iv q12hrs for now  Cont Zosyn 3.375gms iv q12hrs   await culture results.  if blood cultures are negative till tomorrow then DC Zyvox  Time spent - 31 mins

## 2022-01-19 NOTE — ADVANCED PRACTICE NURSE CONSULT - RECOMMEDATIONS
-Clean all wounds with normal saline and apply skin prep to the surrounding skin  -Apply Collagenase ointment to the slough areas of the L. Hip wound, apply saline moistened gauze to the wound bed, and apply a Foam dressing Daily PRN  -Apply Calcium Alginate (aquacel) to the Sacrococcyx wound and cover with a Foam dressing Q 72hrs PRN  -Elevate/float the patients heels using heel protectors and reposition the patient Q 2hrs using wedges or pillows -Clean all wounds with normal saline and apply skin prep to the surrounding skin  -Apply Collagenase ointment to the slough areas of the L. Hip wound, apply saline moistened gauze to the wound bed, and apply a Foam dressing Daily PRN  -Apply Calcium Alginate (aquacel) to the Sacrococcyx wound and cover with a Foam dressing Q 72hrs PRN  -Apply Zinc Oxide Moisture Barrier Cream to the Scrotal area b.i.d. PRN  -Frequent toileting  -Elevate/float the patients heels using heel protectors and reposition the patient Q 2hrs using wedges or pillows

## 2022-01-19 NOTE — PATIENT PROFILE ADULT - FALL HARM RISK - HARM RISK INTERVENTIONS
Assistance with ambulation/Assistance OOB with selected safe patient handling equipment/Communicate Risk of Fall with Harm to all staff/Discuss with provider need for PT consult/Monitor gait and stability/Reinforce activity limits and safety measures with patient and family/Tailored Fall Risk Interventions/Visual Cue: Yellow wristband and red socks/Bed in lowest position, wheels locked, appropriate side rails in place/Call bell, personal items and telephone in reach/Instruct patient to call for assistance before getting out of bed or chair/Non-slip footwear when patient is out of bed/[place] to call system/Physically safe environment - no spills, clutter or unnecessary equipment/Purposeful Proactive Rounding/Room/bathroom lighting operational, light cord in reach

## 2022-01-19 NOTE — PROGRESS NOTE ADULT - SUBJECTIVE AND OBJECTIVE BOX
ICU VISIT  76y Male    Meds:  linezolid  IVPB 600 milliGRAM(s) IV Intermittent every 12 hours  linezolid  IVPB      piperacillin/tazobactam IVPB.. 3.375 Gram(s) IV Intermittent every 12 hours    Allergies    No Known Allergies    Intolerances        VITALS:  Vital Signs Last 24 Hrs  T(C): 35.7 (19 Jan 2022 16:30), Max: 36.5 (19 Jan 2022 06:00)  T(F): 96.3 (19 Jan 2022 16:30), Max: 97.7 (19 Jan 2022 06:00)  HR: 76 (19 Jan 2022 17:00) (74 - 92)  BP: 119/62 (19 Jan 2022 17:00) (96/47 - 131/65)  BP(mean): 76 (19 Jan 2022 17:00) (59 - 86)  RR: 11 (19 Jan 2022 17:00) (10 - 30)  SpO2: 100% (19 Jan 2022 17:00) (91% - 100%)    LABS/DIAGNOSTIC TESTS:                          9.2    10.87 )-----------( 231      ( 19 Jan 2022 04:03 )             27.9         01-19    134<L>  |  104  |  52<H>  ----------------------------<  150<H>  3.8   |  19<L>  |  4.01<H>    Ca    7.7<L>      19 Jan 2022 04:03  Phos  4.3     01-19  Mg     1.9     01-19    TPro  5.7<L>  /  Alb  1.7<L>  /  TBili  0.5  /  DBili  x   /  AST  71<H>  /  ALT  28  /  AlkPhos  107  01-19      LIVER FUNCTIONS - ( 19 Jan 2022 04:03 )  Alb: 1.7 g/dL / Pro: 5.7 g/dL / ALK PHOS: 107 U/L / ALT: 28 U/L DA / AST: 71 U/L / GGT: x             CULTURES: .Blood Blood-Peripheral  01-17 @ 17:49   No growth to date.  --  --      .Surgical Swab left foot wound  11-02 @ 13:15   Few Staphylococcus epidermidis  "Susceptibilities not performed"  --  --            RADIOLOGY:      ROS:  [  ] UNABLE TO ELICIT ICU VISIT  76y Male who remains in the ICU , he is on pressor support still, he is very lethargic today and so not waking up to answer any questions. He has no fevers and his WBC count is near normal. His blood cultures are negative to date.    Meds:  linezolid  IVPB 600 milliGRAM(s) IV Intermittent every 12 hours  linezolid  IVPB      piperacillin/tazobactam IVPB.. 3.375 Gram(s) IV Intermittent every 12 hours    Allergies    No Known Allergies    Intolerances        VITALS:  Vital Signs Last 24 Hrs  T(C): 35.7 (19 Jan 2022 16:30), Max: 36.5 (19 Jan 2022 06:00)  T(F): 96.3 (19 Jan 2022 16:30), Max: 97.7 (19 Jan 2022 06:00)  HR: 76 (19 Jan 2022 17:00) (74 - 92)  BP: 119/62 (19 Jan 2022 17:00) (96/47 - 131/65)  BP(mean): 76 (19 Jan 2022 17:00) (59 - 86)  RR: 11 (19 Jan 2022 17:00) (10 - 30)  SpO2: 100% (19 Jan 2022 17:00) (91% - 100%)    LABS/DIAGNOSTIC TESTS:                          9.2    10.87 )-----------( 231      ( 19 Jan 2022 04:03 )             27.9         01-19    134<L>  |  104  |  52<H>  ----------------------------<  150<H>  3.8   |  19<L>  |  4.01<H>    Ca    7.7<L>      19 Jan 2022 04:03  Phos  4.3     01-19  Mg     1.9     01-19    TPro  5.7<L>  /  Alb  1.7<L>  /  TBili  0.5  /  DBili  x   /  AST  71<H>  /  ALT  28  /  AlkPhos  107  01-19      LIVER FUNCTIONS - ( 19 Jan 2022 04:03 )  Alb: 1.7 g/dL / Pro: 5.7 g/dL / ALK PHOS: 107 U/L / ALT: 28 U/L DA / AST: 71 U/L / GGT: x             CULTURES: .Blood Blood-Peripheral  01-17 @ 17:49   No growth to date.  --  --      .Surgical Swab left foot wound  11-02 @ 13:15   Few Staphylococcus epidermidis  "Susceptibilities not performed"  --  --            RADIOLOGY:      ROS:  [ x ] UNABLE TO ELICIT

## 2022-01-19 NOTE — PROGRESS NOTE ADULT - SUBJECTIVE AND OBJECTIVE BOX
INTERVAL HPI/OVERNIGHT EVENTS: ***    PRESSORS: [ ] YES [ ] NO  WHICH:    ANTIBIOTICS:                  DATE STARTED:  ANTIBIOTICS:                  DATE STARTED:  ANTIBIOTICS:                  DATE STARTED:    Antimicrobial:  linezolid  IVPB 600 milliGRAM(s) IV Intermittent every 12 hours  linezolid  IVPB      piperacillin/tazobactam IVPB.. 3.375 Gram(s) IV Intermittent every 12 hours    Cardiovascular:  norepinephrine Infusion 0.5 MICROgram(s)/kG/Min IV Continuous <Continuous>    Pulmonary:    Hematalogic:  apixaban 2.5 milliGRAM(s) Oral two times a day  aspirin  chewable 81 milliGRAM(s) Oral daily    Other:  atorvastatin 40 milliGRAM(s) Oral at bedtime  chlorhexidine 2% Cloths 1 Application(s) Topical daily  finasteride 5 milliGRAM(s) Oral daily  gabapentin 100 milliGRAM(s) Oral three times a day  hydrocortisone sodium succinate Injectable 50 milliGRAM(s) IV Push every 6 hours  influenza  Vaccine (HIGH DOSE) 0.7 milliLiter(s) IntraMuscular once  insulin lispro (ADMELOG) corrective regimen sliding scale   SubCutaneous every 6 hours  pantoprazole    Tablet 40 milliGRAM(s) Oral before breakfast  sodium chloride 0.9% lock flush 10 milliLiter(s) IV Push every 1 hour PRN  vasopressin Infusion 0.02 Unit(s)/Min IV Continuous <Continuous>    apixaban 2.5 milliGRAM(s) Oral two times a day  aspirin  chewable 81 milliGRAM(s) Oral daily  atorvastatin 40 milliGRAM(s) Oral at bedtime  chlorhexidine 2% Cloths 1 Application(s) Topical daily  finasteride 5 milliGRAM(s) Oral daily  gabapentin 100 milliGRAM(s) Oral three times a day  hydrocortisone sodium succinate Injectable 50 milliGRAM(s) IV Push every 6 hours  influenza  Vaccine (HIGH DOSE) 0.7 milliLiter(s) IntraMuscular once  insulin lispro (ADMELOG) corrective regimen sliding scale   SubCutaneous every 6 hours  linezolid  IVPB 600 milliGRAM(s) IV Intermittent every 12 hours  linezolid  IVPB      norepinephrine Infusion 0.5 MICROgram(s)/kG/Min IV Continuous <Continuous>  pantoprazole    Tablet 40 milliGRAM(s) Oral before breakfast  piperacillin/tazobactam IVPB.. 3.375 Gram(s) IV Intermittent every 12 hours  sodium chloride 0.9% lock flush 10 milliLiter(s) IV Push every 1 hour PRN  vasopressin Infusion 0.02 Unit(s)/Min IV Continuous <Continuous>    Drug Dosing Weight  Height (cm): 170.2 (17 Jan 2022 11:33)  Weight (kg): 60.7 (17 Jan 2022 16:10)  BMI (kg/m2): 21 (17 Jan 2022 16:10)  BSA (m2): 1.7 (17 Jan 2022 16:10)    CENTRAL LINE: [ ] YES [ ] NO  LOCATION:         [place]: [ ] YES [ ] NO          A-LINE:  [ ] YES [ ] NO  LOCATION:             ICU Vital Signs Last 24 Hrs  T(C): 36.5 (19 Jan 2022 06:00), Max: 36.5 (19 Jan 2022 06:00)  T(F): 97.7 (19 Jan 2022 06:00), Max: 97.7 (19 Jan 2022 06:00)  HR: 82 (19 Jan 2022 08:00) (80 - 116)  BP: 121/64 (19 Jan 2022 08:00) (91/55 - 131/69)  BP(mean): 79 (19 Jan 2022 08:00) (64 - 86)  ABP: --  ABP(mean): --  RR: 17 (19 Jan 2022 08:00) (10 - 30)  SpO2: 100% (19 Jan 2022 08:00) (87% - 100%)            01-18 @ 07:01  -  01-19 @ 07:00  --------------------------------------------------------  IN: 1365.5 mL / OUT: 780 mL / NET: 585.5 mL              PHYSICAL EXAM:    GENERAL: NAD  EYES: EOMI, PERRLA  NECK: Supple, No JVD; Normal thyroid; Trachea midline: No LAD   NERVOUS SYSTEM:  Alert & Oriented X3,  Motor Strength 5/5 B/L upper and lower extremities; DTRs 2+ intact and symmetric  CHEST/LUNG: No rales, rhonchi, wheezing, breath sounds present bilaterally  HEART: Regular rate and rhythm; No murmurs, no gallops  ABDOMEN: Soft, Nontender, Nondistended; Bowel sounds present, no pain or masses on palpation  : voiding well, Castañeda in place  EXTREMITIES:  2+ Peripheral Pulses, No clubbing, cyanosis, or edema  SKIN: warm, intact, no lesions         LABS:  CBC Full  -  ( 19 Jan 2022 04:03 )  WBC Count : 10.87 K/uL  RBC Count : 3.16 M/uL  Hemoglobin : 9.2 g/dL  Hematocrit : 27.9 %  Platelet Count - Automated : 231 K/uL  Mean Cell Volume : 88.3 fl  Mean Cell Hemoglobin : 29.1 pg  Mean Cell Hemoglobin Concentration : 33.0 gm/dL  Auto Neutrophil # : x  Auto Lymphocyte # : x  Auto Monocyte # : x  Auto Eosinophil # : x  Auto Basophil # : x  Auto Neutrophil % : x  Auto Lymphocyte % : x  Auto Monocyte % : x  Auto Eosinophil % : x  Auto Basophil % : x    01-19    134<L>  |  104  |  52<H>  ----------------------------<  150<H>  3.8   |  19<L>  |  4.01<H>    Ca    7.7<L>      19 Jan 2022 04:03  Phos  4.3     01-19  Mg     1.9     01-19    TPro  5.7<L>  /  Alb  1.7<L>  /  TBili  0.5  /  DBili  x   /  AST  71<H>  /  ALT  28  /  AlkPhos  107  01-19    PT/INR - ( 17 Jan 2022 12:12 )   PT: 18.6 sec;   INR: 1.60 ratio         PTT - ( 17 Jan 2022 12:12 )  PTT:34.1 sec    Culture Results:   No growth to date. (01-17 @ 17:49)  Culture Results:   No growth to date. (01-17 @ 17:49)      RADIOLOGY & ADDITIONAL STUDIES REVIEWED:  ***    [ ]GOALS OF CARE DISCUSSION WITH PATIENT/FAMILY/PROXY:    CRITICAL CARE TIME SPENT: 35 minutes   INTERVAL HPI/OVERNIGHT EVENTS: Patient had no overnight events. Patient is more responsive + alert. Levophed requirements have been decreasing while on vasopressin, UO stable, still net positive.     PRESSORS: [ x] YES [ ] NO  WHICH: Levophed and Vasopressin     Antimicrobial:  linezolid  IVPB 600 milliGRAM(s) IV Intermittent every 12 hours  linezolid  IVPB      piperacillin/tazobactam IVPB.. 3.375 Gram(s) IV Intermittent every 12 hours    Cardiovascular:  norepinephrine Infusion 0.5 MICROgram(s)/kG/Min IV Continuous <Continuous>    Pulmonary:    Hematalogic:  apixaban 2.5 milliGRAM(s) Oral two times a day  aspirin  chewable 81 milliGRAM(s) Oral daily    Other:  atorvastatin 40 milliGRAM(s) Oral at bedtime  chlorhexidine 2% Cloths 1 Application(s) Topical daily  finasteride 5 milliGRAM(s) Oral daily  gabapentin 100 milliGRAM(s) Oral three times a day  hydrocortisone sodium succinate Injectable 50 milliGRAM(s) IV Push every 6 hours  influenza  Vaccine (HIGH DOSE) 0.7 milliLiter(s) IntraMuscular once  insulin lispro (ADMELOG) corrective regimen sliding scale   SubCutaneous every 6 hours  pantoprazole    Tablet 40 milliGRAM(s) Oral before breakfast  sodium chloride 0.9% lock flush 10 milliLiter(s) IV Push every 1 hour PRN  vasopressin Infusion 0.02 Unit(s)/Min IV Continuous <Continuous>    apixaban 2.5 milliGRAM(s) Oral two times a day  aspirin  chewable 81 milliGRAM(s) Oral daily  atorvastatin 40 milliGRAM(s) Oral at bedtime  chlorhexidine 2% Cloths 1 Application(s) Topical daily  finasteride 5 milliGRAM(s) Oral daily  gabapentin 100 milliGRAM(s) Oral three times a day  hydrocortisone sodium succinate Injectable 50 milliGRAM(s) IV Push every 6 hours  influenza  Vaccine (HIGH DOSE) 0.7 milliLiter(s) IntraMuscular once  insulin lispro (ADMELOG) corrective regimen sliding scale   SubCutaneous every 6 hours  linezolid  IVPB 600 milliGRAM(s) IV Intermittent every 12 hours  linezolid  IVPB      norepinephrine Infusion 0.5 MICROgram(s)/kG/Min IV Continuous <Continuous>  pantoprazole    Tablet 40 milliGRAM(s) Oral before breakfast  piperacillin/tazobactam IVPB.. 3.375 Gram(s) IV Intermittent every 12 hours  sodium chloride 0.9% lock flush 10 milliLiter(s) IV Push every 1 hour PRN  vasopressin Infusion 0.02 Unit(s)/Min IV Continuous <Continuous>    Drug Dosing Weight  Height (cm): 170.2 (17 Jan 2022 11:33)  Weight (kg): 60.7 (17 Jan 2022 16:10)  BMI (kg/m2): 21 (17 Jan 2022 16:10)  BSA (m2): 1.7 (17 Jan 2022 16:10)    CENTRAL LINE: [ ] YES [ ] NO  LOCATION:         [place]: [ ] YES [ ] NO          A-LINE:  [ ] YES [ ] NO  LOCATION:             ICU Vital Signs Last 24 Hrs  T(C): 36.5 (19 Jan 2022 06:00), Max: 36.5 (19 Jan 2022 06:00)  T(F): 97.7 (19 Jan 2022 06:00), Max: 97.7 (19 Jan 2022 06:00)  HR: 82 (19 Jan 2022 08:00) (80 - 116)  BP: 121/64 (19 Jan 2022 08:00) (91/55 - 131/69)  BP(mean): 79 (19 Jan 2022 08:00) (64 - 86)  ABP: --  ABP(mean): --  RR: 17 (19 Jan 2022 08:00) (10 - 30)  SpO2: 100% (19 Jan 2022 08:00) (87% - 100%)            01-18 @ 07:01  -  01-19 @ 07:00  --------------------------------------------------------  IN: 1365.5 mL / OUT: 780 mL / NET: 585.5 mL              PHYSICAL EXAM:    GENERAL: NAD, RIJ, cachectic, temporal wasting  EYES: EOMI, PERRLA  NECK: Supple, No JVD; Normal thyroid; Trachea midline: No LAD   NERVOUS SYSTEM:  Alert & Oriented X2-3,  Motor Strength 5/5 B/L upper and lower extremities; DTRs 2+ intact and symmetric  CHEST/LUNG: No rales, rhonchi, wheezing, breath sounds present bilaterally  HEART: Regular rate and rhythm; No murmurs, no gallops  ABDOMEN: Soft, Nontender, Nondistended; Bowel sounds present, no pain or masses on palpation  : no urine output, penile ulcer  EXTREMITIES:  2+ Peripheral Pulses, No clubbing, cyanosis, or edema  SKIN: warm, multiple skin ulcer on back, lower extremities and penile area         LABS:  CBC Full  -  ( 19 Jan 2022 04:03 )  WBC Count : 10.87 K/uL  RBC Count : 3.16 M/uL  Hemoglobin : 9.2 g/dL  Hematocrit : 27.9 %  Platelet Count - Automated : 231 K/uL  Mean Cell Volume : 88.3 fl  Mean Cell Hemoglobin : 29.1 pg  Mean Cell Hemoglobin Concentration : 33.0 gm/dL  Auto Neutrophil # : x  Auto Lymphocyte # : x  Auto Monocyte # : x  Auto Eosinophil # : x  Auto Basophil # : x  Auto Neutrophil % : x  Auto Lymphocyte % : x  Auto Monocyte % : x  Auto Eosinophil % : x  Auto Basophil % : x    01-19    134<L>  |  104  |  52<H>  ----------------------------<  150<H>  3.8   |  19<L>  |  4.01<H>    Ca    7.7<L>      19 Jan 2022 04:03  Phos  4.3     01-19  Mg     1.9     01-19    TPro  5.7<L>  /  Alb  1.7<L>  /  TBili  0.5  /  DBili  x   /  AST  71<H>  /  ALT  28  /  AlkPhos  107  01-19    PT/INR - ( 17 Jan 2022 12:12 )   PT: 18.6 sec;   INR: 1.60 ratio         PTT - ( 17 Jan 2022 12:12 )  PTT:34.1 sec    Culture Results:   No growth to date. (01-17 @ 17:49)  Culture Results:   No growth to date. (01-17 @ 17:49)      RADIOLOGY & ADDITIONAL STUDIES REVIEWED:  ***    [ ]GOALS OF CARE DISCUSSION WITH PATIENT/FAMILY/PROXY:    CRITICAL CARE TIME SPENT: 35 minutes   INTERVAL HPI/OVERNIGHT EVENTS: Patient had no overnight events. Patient is more responsive + alert. Levophed requirements have been decreasing while on vasopressin, UO stable, still net positive.     PRESSORS: [ x] YES [ ] NO  WHICH: Levophed and Vasopressin     Antimicrobial:  linezolid  IVPB 600 milliGRAM(s) IV Intermittent every 12 hours  linezolid  IVPB      piperacillin/tazobactam IVPB.. 3.375 Gram(s) IV Intermittent every 12 hours    Cardiovascular:  norepinephrine Infusion 0.5 MICROgram(s)/kG/Min IV Continuous <Continuous>    Pulmonary:    Hematalogic:  apixaban 2.5 milliGRAM(s) Oral two times a day  aspirin  chewable 81 milliGRAM(s) Oral daily    Other:  atorvastatin 40 milliGRAM(s) Oral at bedtime  chlorhexidine 2% Cloths 1 Application(s) Topical daily  finasteride 5 milliGRAM(s) Oral daily  gabapentin 100 milliGRAM(s) Oral three times a day  hydrocortisone sodium succinate Injectable 50 milliGRAM(s) IV Push every 6 hours  influenza  Vaccine (HIGH DOSE) 0.7 milliLiter(s) IntraMuscular once  insulin lispro (ADMELOG) corrective regimen sliding scale   SubCutaneous every 6 hours  pantoprazole    Tablet 40 milliGRAM(s) Oral before breakfast  sodium chloride 0.9% lock flush 10 milliLiter(s) IV Push every 1 hour PRN  vasopressin Infusion 0.02 Unit(s)/Min IV Continuous <Continuous>    apixaban 2.5 milliGRAM(s) Oral two times a day  aspirin  chewable 81 milliGRAM(s) Oral daily  atorvastatin 40 milliGRAM(s) Oral at bedtime  chlorhexidine 2% Cloths 1 Application(s) Topical daily  finasteride 5 milliGRAM(s) Oral daily  gabapentin 100 milliGRAM(s) Oral three times a day  hydrocortisone sodium succinate Injectable 50 milliGRAM(s) IV Push every 6 hours  influenza  Vaccine (HIGH DOSE) 0.7 milliLiter(s) IntraMuscular once  insulin lispro (ADMELOG) corrective regimen sliding scale   SubCutaneous every 6 hours  linezolid  IVPB 600 milliGRAM(s) IV Intermittent every 12 hours  linezolid  IVPB      norepinephrine Infusion 0.5 MICROgram(s)/kG/Min IV Continuous <Continuous>  pantoprazole    Tablet 40 milliGRAM(s) Oral before breakfast  piperacillin/tazobactam IVPB.. 3.375 Gram(s) IV Intermittent every 12 hours  sodium chloride 0.9% lock flush 10 milliLiter(s) IV Push every 1 hour PRN  vasopressin Infusion 0.02 Unit(s)/Min IV Continuous <Continuous>    Drug Dosing Weight  Height (cm): 170.2 (17 Jan 2022 11:33)  Weight (kg): 60.7 (17 Jan 2022 16:10)  BMI (kg/m2): 21 (17 Jan 2022 16:10)  BSA (m2): 1.7 (17 Jan 2022 16:10)    CENTRAL LINE: [ ] YES [ ] NO  LOCATION:         [place]: [ ] YES [ ] NO          A-LINE:  [ ] YES [ ] NO  LOCATION:             ICU Vital Signs Last 24 Hrs  T(C): 36.5 (19 Jan 2022 06:00), Max: 36.5 (19 Jan 2022 06:00)  T(F): 97.7 (19 Jan 2022 06:00), Max: 97.7 (19 Jan 2022 06:00)  HR: 82 (19 Jan 2022 08:00) (80 - 116)  BP: 121/64 (19 Jan 2022 08:00) (91/55 - 131/69)  BP(mean): 79 (19 Jan 2022 08:00) (64 - 86)  ABP: --  ABP(mean): --  RR: 17 (19 Jan 2022 08:00) (10 - 30)  SpO2: 100% (19 Jan 2022 08:00) (87% - 100%)            01-18 @ 07:01  -  01-19 @ 07:00  --------------------------------------------------------  IN: 1365.5 mL / OUT: 780 mL / NET: 585.5 mL              PHYSICAL EXAM:    GENERAL: NAD, RIJ, cachectic, temporal wasting  EYES: EOMI, PERRLA  NECK: Supple, No JVD; Normal thyroid; Trachea midline: No LAD   NERVOUS SYSTEM:  Alert & Oriented X2-3,  Motor Strength 5/5 B/L upper and lower extremities; DTRs 2+ intact and symmetric  CHEST/LUNG: No rales, rhonchi, wheezing, breath sounds present bilaterally  HEART: Regular rate and rhythm; No murmurs, no gallops  ABDOMEN: Soft, Nontender, Nondistended; Bowel sounds present, no pain or masses on palpation  : Castañeda in place, penile ulcer  EXTREMITIES:  2+ Peripheral Pulses, No clubbing, cyanosis, or edema  SKIN: warm, multiple skin ulcer on back, lower extremities and penile area         LABS:  CBC Full  -  ( 19 Jan 2022 04:03 )  WBC Count : 10.87 K/uL  RBC Count : 3.16 M/uL  Hemoglobin : 9.2 g/dL  Hematocrit : 27.9 %  Platelet Count - Automated : 231 K/uL  Mean Cell Volume : 88.3 fl  Mean Cell Hemoglobin : 29.1 pg  Mean Cell Hemoglobin Concentration : 33.0 gm/dL  Auto Neutrophil # : x  Auto Lymphocyte # : x  Auto Monocyte # : x  Auto Eosinophil # : x  Auto Basophil # : x  Auto Neutrophil % : x  Auto Lymphocyte % : x  Auto Monocyte % : x  Auto Eosinophil % : x  Auto Basophil % : x    01-19    134<L>  |  104  |  52<H>  ----------------------------<  150<H>  3.8   |  19<L>  |  4.01<H>    Ca    7.7<L>      19 Jan 2022 04:03  Phos  4.3     01-19  Mg     1.9     01-19    TPro  5.7<L>  /  Alb  1.7<L>  /  TBili  0.5  /  DBili  x   /  AST  71<H>  /  ALT  28  /  AlkPhos  107  01-19    PT/INR - ( 17 Jan 2022 12:12 )   PT: 18.6 sec;   INR: 1.60 ratio         PTT - ( 17 Jan 2022 12:12 )  PTT:34.1 sec    Culture Results:   No growth to date. (01-17 @ 17:49)  Culture Results:   No growth to date. (01-17 @ 17:49)      RADIOLOGY & ADDITIONAL STUDIES REVIEWED:  ***    [ ]GOALS OF CARE DISCUSSION WITH PATIENT/FAMILY/PROXY:    CRITICAL CARE TIME SPENT: 35 minutes

## 2022-01-19 NOTE — DIETITIAN INITIAL EVALUATION ADULT. - PERTINENT MEDS FT
MEDICATIONS  (STANDING):  apixaban 2.5 milliGRAM(s) Oral two times a day  aspirin  chewable 81 milliGRAM(s) Oral daily  atorvastatin 40 milliGRAM(s) Oral at bedtime  chlorhexidine 2% Cloths 1 Application(s) Topical daily  finasteride 5 milliGRAM(s) Oral daily  gabapentin 100 milliGRAM(s) Oral three times a day  hydrocortisone sodium succinate Injectable 50 milliGRAM(s) IV Push every 8 hours  influenza  Vaccine (HIGH DOSE) 0.7 milliLiter(s) IntraMuscular once  insulin lispro (ADMELOG) corrective regimen sliding scale   SubCutaneous Before meals and at bedtime  linezolid  IVPB 600 milliGRAM(s) IV Intermittent every 12 hours  linezolid  IVPB      norepinephrine Infusion 0.5 MICROgram(s)/kG/Min (28.5 mL/Hr) IV Continuous <Continuous>  pantoprazole    Tablet 40 milliGRAM(s) Oral before breakfast  piperacillin/tazobactam IVPB.. 3.375 Gram(s) IV Intermittent every 12 hours  vasopressin Infusion 0.04 Unit(s)/Min (2.4 mL/Hr) IV Continuous <Continuous>    MEDICATIONS  (PRN):  sodium chloride 0.9% lock flush 10 milliLiter(s) IV Push every 1 hour PRN Pre/post blood products, medications, blood draw, and to maintain line patency

## 2022-01-19 NOTE — DIETITIAN INITIAL EVALUATION ADULT. - ADD RECOMMEND
PCM (moderate). Least restrictive diet needed, therapeutically ("puree") with /Nepro (or 2 cass HN). MD to monitor. RD available.

## 2022-01-19 NOTE — DIETITIAN INITIAL EVALUATION ADULT. - OTHER INFO
Pt seen, asleep. RN reports pt did not eat the solids, took some liquids. Enlive provided with thickener pkt. Discussed with RN. Pt noted with stage 3 pressure injury sacrum, left hip unstageable. Also noted as cachectic with temporal wasting.

## 2022-01-20 LAB
ALBUMIN SERPL ELPH-MCNC: 1.6 G/DL — LOW (ref 3.5–5)
ALP SERPL-CCNC: 103 U/L — SIGNIFICANT CHANGE UP (ref 40–120)
ALT FLD-CCNC: 30 U/L DA — SIGNIFICANT CHANGE UP (ref 10–60)
ANION GAP SERPL CALC-SCNC: 14 MMOL/L — SIGNIFICANT CHANGE UP (ref 5–17)
AST SERPL-CCNC: 54 U/L — HIGH (ref 10–40)
BILIRUB SERPL-MCNC: 0.4 MG/DL — SIGNIFICANT CHANGE UP (ref 0.2–1.2)
BUN SERPL-MCNC: 54 MG/DL — HIGH (ref 7–18)
CALCIUM SERPL-MCNC: 7.8 MG/DL — LOW (ref 8.4–10.5)
CHLORIDE SERPL-SCNC: 102 MMOL/L — SIGNIFICANT CHANGE UP (ref 96–108)
CO2 SERPL-SCNC: 19 MMOL/L — LOW (ref 22–31)
CREAT SERPL-MCNC: 4.27 MG/DL — HIGH (ref 0.5–1.3)
CULTURE RESULTS: SIGNIFICANT CHANGE UP
GLUCOSE BLDC GLUCOMTR-MCNC: 134 MG/DL — HIGH (ref 70–99)
GLUCOSE BLDC GLUCOMTR-MCNC: 147 MG/DL — HIGH (ref 70–99)
GLUCOSE BLDC GLUCOMTR-MCNC: 152 MG/DL — HIGH (ref 70–99)
GLUCOSE BLDC GLUCOMTR-MCNC: 203 MG/DL — HIGH (ref 70–99)
GLUCOSE BLDC GLUCOMTR-MCNC: 222 MG/DL — HIGH (ref 70–99)
GLUCOSE SERPL-MCNC: 193 MG/DL — HIGH (ref 70–99)
HCT VFR BLD CALC: 26 % — LOW (ref 39–50)
HGB BLD-MCNC: 8.8 G/DL — LOW (ref 13–17)
MAGNESIUM SERPL-MCNC: 2.2 MG/DL — SIGNIFICANT CHANGE UP (ref 1.6–2.6)
MCHC RBC-ENTMCNC: 29.2 PG — SIGNIFICANT CHANGE UP (ref 27–34)
MCHC RBC-ENTMCNC: 33.8 GM/DL — SIGNIFICANT CHANGE UP (ref 32–36)
MCV RBC AUTO: 86.4 FL — SIGNIFICANT CHANGE UP (ref 80–100)
NRBC # BLD: 0 /100 WBCS — SIGNIFICANT CHANGE UP (ref 0–0)
PHOSPHATE SERPL-MCNC: 4.9 MG/DL — HIGH (ref 2.5–4.5)
PLATELET # BLD AUTO: 195 K/UL — SIGNIFICANT CHANGE UP (ref 150–400)
POTASSIUM SERPL-MCNC: 3.7 MMOL/L — SIGNIFICANT CHANGE UP (ref 3.5–5.3)
POTASSIUM SERPL-SCNC: 3.7 MMOL/L — SIGNIFICANT CHANGE UP (ref 3.5–5.3)
PROT SERPL-MCNC: 5.7 G/DL — LOW (ref 6–8.3)
RBC # BLD: 3.01 M/UL — LOW (ref 4.2–5.8)
RBC # FLD: 16.3 % — HIGH (ref 10.3–14.5)
SODIUM SERPL-SCNC: 135 MMOL/L — SIGNIFICANT CHANGE UP (ref 135–145)
SPECIMEN SOURCE: SIGNIFICANT CHANGE UP
WBC # BLD: 9.24 K/UL — SIGNIFICANT CHANGE UP (ref 3.8–10.5)
WBC # FLD AUTO: 9.24 K/UL — SIGNIFICANT CHANGE UP (ref 3.8–10.5)

## 2022-01-20 RX ORDER — MUPIROCIN 20 MG/G
1 OINTMENT TOPICAL
Refills: 0 | Status: COMPLETED | OUTPATIENT
Start: 2022-01-20 | End: 2022-01-25

## 2022-01-20 RX ORDER — HYDROCORTISONE 20 MG
50 TABLET ORAL EVERY 12 HOURS
Refills: 0 | Status: DISCONTINUED | OUTPATIENT
Start: 2022-01-20 | End: 2022-01-24

## 2022-01-20 RX ADMIN — Medication 0: at 17:01

## 2022-01-20 RX ADMIN — Medication 4: at 21:00

## 2022-01-20 RX ADMIN — APIXABAN 2.5 MILLIGRAM(S): 2.5 TABLET, FILM COATED ORAL at 05:27

## 2022-01-20 RX ADMIN — ATORVASTATIN CALCIUM 40 MILLIGRAM(S): 80 TABLET, FILM COATED ORAL at 21:00

## 2022-01-20 RX ADMIN — FINASTERIDE 5 MILLIGRAM(S): 5 TABLET, FILM COATED ORAL at 11:01

## 2022-01-20 RX ADMIN — LINEZOLID 300 MILLIGRAM(S): 600 INJECTION, SOLUTION INTRAVENOUS at 17:03

## 2022-01-20 RX ADMIN — Medication 50 MILLIGRAM(S): at 05:27

## 2022-01-20 RX ADMIN — LINEZOLID 300 MILLIGRAM(S): 600 INJECTION, SOLUTION INTRAVENOUS at 05:25

## 2022-01-20 RX ADMIN — Medication 2: at 11:01

## 2022-01-20 RX ADMIN — MUPIROCIN 1 APPLICATION(S): 20 OINTMENT TOPICAL at 17:02

## 2022-01-20 RX ADMIN — APIXABAN 2.5 MILLIGRAM(S): 2.5 TABLET, FILM COATED ORAL at 17:01

## 2022-01-20 RX ADMIN — Medication 81 MILLIGRAM(S): at 11:01

## 2022-01-20 RX ADMIN — CHLORHEXIDINE GLUCONATE 1 APPLICATION(S): 213 SOLUTION TOPICAL at 11:02

## 2022-01-20 RX ADMIN — GABAPENTIN 100 MILLIGRAM(S): 400 CAPSULE ORAL at 05:27

## 2022-01-20 RX ADMIN — PIPERACILLIN AND TAZOBACTAM 25 GRAM(S): 4; .5 INJECTION, POWDER, LYOPHILIZED, FOR SOLUTION INTRAVENOUS at 17:02

## 2022-01-20 RX ADMIN — Medication 4: at 05:43

## 2022-01-20 RX ADMIN — PANTOPRAZOLE SODIUM 40 MILLIGRAM(S): 20 TABLET, DELAYED RELEASE ORAL at 05:27

## 2022-01-20 RX ADMIN — PIPERACILLIN AND TAZOBACTAM 25 GRAM(S): 4; .5 INJECTION, POWDER, LYOPHILIZED, FOR SOLUTION INTRAVENOUS at 05:24

## 2022-01-20 RX ADMIN — Medication 50 MILLIGRAM(S): at 17:01

## 2022-01-20 NOTE — CONSULT NOTE ADULT - ASSESSMENT
1. OREN due to ATN.  -scr slowly worsening but stable electrolytes; UO is increasing and no urgent to HD.   -urinalysis shows granular casts. will do urine lytes (uosm, urine sodium, urine creatinine, chloride, potassium), spot protein to creatinine ratio  -Adjust meds to eGFR and avoid IV Gadolinium contrast,NSAIDs, and phosphate enema.  -Monitor I/O's daily.   -Monitor SMA daily.  2. CKD stage 3 most likely due to diabetic nephropathy  -Baseline Scr around 1.2mg/dL  -Keep patient euvolemic and renal diet  -Avoid Nephrotoxic Meds/ Agents such as (NSAIDs, IV contrast, Aminoglycosides such as gentamicin, -Gadolinium contrast, Phosphate containing enemas, etc..)  -Adjust Medications according to eGFR  3. Anemia: hb is stable. monitor CBC  4. Hypotension: on low dose pressor.  5. MBD: phos is okay    Patient is critically ill. Time Spent: 35mins.  Discussed the assessment and plan with ICU Team/Nurse

## 2022-01-20 NOTE — CONSULT NOTE ADULT - SUBJECTIVE AND OBJECTIVE BOX
NEPHROLOGY MEDICAL CARE, [facility] - Dr. Tariq Herrera/ Dr. Lauren Lopez/ Dr. Dre Call/ Dr. Destiny Knott    Patient was seen and examined at bedside.     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 Renal consulted for OREN on CKD. Pt was admitted with scr around 4.45mg/dL and previous Scr around 1.2mg/dL on .  He was found to be hypotensive 60/30 and received 3L bolus in ED after which BP was still low 70/30, thus patient was taken to ICU for pressors. Pt has known ckd from diabetes with scr around 1.20mg/dL last few months.     PMH:   HTN (hypertension)    HLD (hyperlipidemia)    DM (diabetes mellitus)    CAD (coronary artery disease)    Glaucoma, angle-closure    [place] (hard of hearing)        PSH:     S/P CABG (coronary artery bypass graft)  History of thyroid surgery        FAMILY HISTORY:  Family history of acute myocardial infarction (Father)    Family history of diabetes mellitus (Mother, Sibling)    Family history of hypertension (Mother, Sibling)    Family history of hyperlipidemia (Mother, Sibling)        Social History:  non-smoker/ non-alcoholic     Home Meds:  Home Medications:  apixaban 5 mg oral tablet: 1 tab(s) orally every 12 hours (2022 18:57)  bisacodyl 10 mg rectal suppository: 1 suppository(ies) rectal once (2022 18:57)  collagenase 250 units/g topical ointment: 1 application topically once a day (2022 18:57)  finasteride 5 mg oral tablet: 1 tab(s) orally once a day (2022 18:57)  fluconazole: 100 milligram(s) intravenous once a day (2022 18:57)  gabapentin 100 mg oral capsule: 1 cap(s) orally 3 times a day (2022 18:57)  pantoprazole 40 mg oral delayed release tablet: 1 tab(s) orally once a day (before a meal) (2022 18:57)  polyethylene glycol 3350 oral powder for reconstitution: 17 gram(s) orally once a day (2022 18:57)  rosuvastatin 40 mg oral tablet: 1 tab(s) orally once a day (2022 18:57)  senna oral tablet: 2 tab(s) orally once a day (at bedtime) (2022 18:57)  tamsulosin 0.4 mg oral capsule: 1 cap(s) orally once a day (at bedtime) (2022 18:57)      Allergies:  Allergies    No Known Allergies    Intolerances        REVIEW OF SYSTEMS:  unable to obtain due to mental status    Vital Signs Last 24 Hrs  T(C): 35.8 (2022 11:45), Max: 36.2 (2022 23:00)  T(F): 96.5 (2022 11:45), Max: 97.2 (2022 23:00)  HR: 71 (2022 12:45) (70 - 80)  BP: 99/54 (2022 12:45) (87/53 - 133/71)  BP(mean): 64 (2022 12:45) (59 - 91)  RR: 12 (2022 12:45) (10 - 23)  SpO2: 100% (2022 12:45) (98% - 100%)     @ 07: @ 07:00  --------------------------------------------------------  IN: 1134.1 mL / OUT: 310 mL / NET: 824.1 mL     @ 07: @ 13:53  --------------------------------------------------------  IN: 120.4 mL / OUT: 105 mL / NET: 15.4 mL          PHYSICAL EXAM:  General: No acute respiratory distress.  Eyes: conjunctiva and sclera clear  ENMT: Atraumatic, Normocephalic, supple, No JVD present. Moist mucous membranes  Respiratory: Bilateral poor air entry; No rales, rhonchi, wheezing  Cardiovassular: S1S2+; no m/r/g  Gastrointestinal: Soft, Non-tender, Nondistended; Bowel sounds present  : ibrahim's cath with brown colored urine  Neuro:  Awake but confused..   Ext: trace edema  Skin: No visible rashes        LABS:                        8.8    9.24  )-----------( 195      ( 2022 04:25 )             26.0         135  |  102  |  54<H>  ----------------------------<  193<H>  3.7   |  19<L>  |  4.27<H>    Ca    7.8<L>      2022 04:25  Phos  4.9       Mg     2.2         TPro  5.7<L>  /  Alb  1.6<L>  /  TBili  0.4  /  DBili  x   /  AST  54<H>  /  ALT  30  /  AlkPhos  103        Urinalysis Basic - ( 2022 07:32 )    Color: Yellow / Appearance: very cloudy / S.020 / pH: x  Gluc: x / Ketone: Negative  / Bili: Negative / Urobili: Negative   Blood: x / Protein: 100 / Nitrite: Negative   Leuk Esterase: Moderate / RBC: >50 /HPF / WBC >50 /HPF   Sq Epi: x / Non Sq Epi: Occasional /HPF / Bacteria: See Note /HPF      Magnesium, Serum: 2.2 mg/dL ( @ 04:25)  Phosphorus Level, Serum: 4.9 mg/dL *H* ( @ 04:25)    Urine studies      Medications:  MEDICATIONS  (STANDING):  apixaban 2.5 milliGRAM(s) Oral two times a day  aspirin  chewable 81 milliGRAM(s) Oral daily  atorvastatin 40 milliGRAM(s) Oral at bedtime  chlorhexidine 2% Cloths 1 Application(s) Topical daily  finasteride 5 milliGRAM(s) Oral daily  hydrocortisone sodium succinate Injectable 50 milliGRAM(s) IV Push every 12 hours  influenza  Vaccine (HIGH DOSE) 0.7 milliLiter(s) IntraMuscular once  insulin lispro (ADMELOG) corrective regimen sliding scale   SubCutaneous Before meals and at bedtime  linezolid  IVPB      linezolid  IVPB 600 milliGRAM(s) IV Intermittent every 12 hours  norepinephrine Infusion 0.5 MICROgram(s)/kG/Min (28.5 mL/Hr) IV Continuous <Continuous>  pantoprazole    Tablet 40 milliGRAM(s) Oral before breakfast  piperacillin/tazobactam IVPB.. 3.375 Gram(s) IV Intermittent every 12 hours  vasopressin Infusion 0.04 Unit(s)/Min (2.4 mL/Hr) IV Continuous <Continuous>    MEDICATIONS  (PRN):

## 2022-01-20 NOTE — PROGRESS NOTE ADULT - ASSESSMENT
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 Patient received 3L bolus in ED, s/p BP was still low 70/30s. Patient admitted to ICU for septic shock requiring pressors.     #Septic shock 2/2 ulcers vs UTI  #Delirium  #Acute renal failure  #Penile ulcer  #Sacral ulcer  #Diabetic foot ulcer  #Elevated INR  #Elevated LFTs    -NEURO  Patient AAOx3  Lethargy was likely delirium due to infection, now resolving   CTH showed  Moderate periventricular and deep white matter ischemia. Encephalomalacia and gliosis in RIGHT occipital lobe. Global atrophy. 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    -PULMONARY  No active issues at present    -CVS  #Septic shock likely 2/2 ulcers vs UTI  Patient presented with hypotension  Was given 3 L bolus in ED, BP was still low  S/p 6L of IVF total   C/w Levophed, added Vasopressin   C/w stress dose of Solu-cortef 50 mg q8 hrs   Switched to Linezolid and Zosyn   No more fluids for now, Will monitor UO   UA +ve and Ucx testing  ID Dr Randhawa on board     -GI  Has elevated INR and AST  Likely 2/2 septic shock  Will monitor CMP  Diet Puree renal diet    -RENAL  #Acute renal failure  Patient has normal creatinine at baseline, 1.18 Nov 2021  Presented with elevated BUN/cr of 51/4.45  Most likely ATN from hypoperfusion   S/p 6L bolus, No more fluids for now  Will monitor UO and BMP    -ID  Septic shock 2/2 ulcers vs UTI  Patient has septic shock 2/2 SSTI  C/w Linezolid and Zosyn   UA +ve and Ucx testing  ID Dr Randhawa on board    -ENDO  DM  Patient has h/o IDDM, on 10 units of Lantus at bedtime  Diet Puree renal diet   Monitor ACHS    -SKIN  Patient has stage 3/4 sacral ulcer, penile ulcer, foot ulcer  Wound care on board  Dr Garcia consulted  C/w Linezolid and Zosyn  ID Dr Randhawa on board   on board    -Prophylactic measure  C/w Eliquis dose adjusted to 2.5 BID  C/w ppi    -GOC   Full Code  75 yo male from [facility] assisted living with medical history of DM on insulin, HTN, HLD, CAD S/p CABG, 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 Patient received 3L bolus in ED, s/p BP was still low 70/30s. Patient admitted to ICU for septic shock requiring pressors.     #Septic shock 2/2 ulcers vs UTI  #Delirium  #Acute renal failure  #Penile ulcer  #Sacral ulcer  #Diabetic foot ulcer  #Elevated INR  #Elevated LFTs    -NEURO  Patient AAOx3  Lethargy was likely delirium due to infection, now resolving   CTH showed  Moderate periventricular and deep white matter ischemia. Encephalomalacia and gliosis in RIGHT occipital lobe. Global atrophy. 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    -PULMONARY  No active issues at present    -CVS  #Septic shock likely 2/2 ulcers vs UTI  Patient presented with hypotension  Was given 3 L bolus in ED, BP was still low  S/p 6L of IVF total   C/w Levophed, off Vasopressin   C/w stress dose of Solu-cortef 50 mg q12 hrs, deescalating   Switched to Linezolid and Zosyn   No more fluids for now, Will monitor UO   UA +ve and Ucx testing  ID Dr Randhawa on board     -GI  Has elevated INR and AST  Likely 2/2 septic shock  Will monitor CMP  Diet Puree renal diet    -RENAL  #Acute renal failure- around 4.2  Patient has normal creatinine at baseline, 1.18 Nov 2021  Presented with elevated BUN/cr of 51/4.45  Most likely ATN from hypoperfusion   S/p 6L bolus, No more fluids for now  Will monitor UO and BMP    -ID  Septic shock 2/2 ulcers vs UTI  Patient has septic shock 2/2 SSTI  C/w Linezolid and Zosyn   UA +ve and Ucx testing  ID Dr Randhawa on board    -ENDO  DM  Patient has h/o IDDM, on 10 units of Lantus at bedtime  Diet Puree renal diet   Monitor ACHS    -SKIN  Patient has stage 3/4 sacral ulcer, penile ulcer, foot ulcer  Wound care on board  Dr Garcia consulted  C/w Linezolid and Zosyn  ID Dr Randhawa on board   on board    -Prophylactic measure  C/w Eliquis dose adjusted to 2.5 BID  C/w ppi    -GOC   Full Code  77 yo male from [facility] assisted living with medical history of DM on insulin, HTN, HLD, CAD S/p CABG, 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 Patient received 3L bolus in ED, s/p BP was still low 70/30s. Patient admitted to ICU for septic shock requiring pressors.     #Septic shock 2/2 ulcers vs UTI  #Delirium  #Acute renal failure  #Penile ulcer  #Sacral ulcer  #Diabetic foot ulcer  #Elevated INR  #Elevated LFTs    -NEURO  Patient AAOx3  Lethargy was likely delirium due to infection, now resolving   CTH showed  Moderate periventricular and deep white matter ischemia. Encephalomalacia and gliosis in RIGHT occipital lobe. Global atrophy. 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    -PULMONARY  No active issues at present    -CVS  #Septic shock likely 2/2 ulcers vs UTI  Patient presented with hypotension  Was given 3 L bolus in ED, BP was still low  S/p 6L of IVF total   C/w Levophed, off Vasopressin   C/w stress dose of Solu-cortef 50 mg q12 hrs, deescalating   Switched to Linezolid and Zosyn   No more fluids for now, Will monitor UO   UA +ve and Ucx testing  ID Dr Randhawa on board     -GI  Has elevated INR and AST  Likely 2/2 septic shock  Will monitor CMP  Diet Puree renal diet    -RENAL  #Acute renal failure- around 4.2  Patient has normal creatinine at baseline, 1.18 Nov 2021  Presented with elevated BUN/cr of 51/4.45  Most likely ATN from hypoperfusion   S/p 6L bolus, No more fluids for now  Will monitor UO and BMP  Nephro consulted    -ID  Septic shock 2/2 ulcers vs UTI  Patient has septic shock 2/2 SSTI  C/w Linezolid and Zosyn   UA +ve and Ucx testing  ID Dr Randhawa on board    -ENDO  DM  Patient has h/o IDDM, on 10 units of Lantus at bedtime  Diet Puree renal diet   Monitor ACHS    -SKIN  Patient has stage 3/4 sacral ulcer, penile ulcer, foot ulcer  Wound care on board  Dr Garcia consulted  C/w Linezolid and Zosyn  ID Dr Randhawa on board   on board    -Prophylactic measure  C/w Eliquis dose adjusted to 2.5 BID  C/w ppi    -GOC   Full Code  75 yo male from [facility] assisted living with medical history of DM on insulin, HTN, HLD, CAD S/p CABG, 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 Patient received 3L bolus in ED, s/p BP was still low 70/30s. Patient admitted to ICU for septic shock requiring pressors.     #Septic shock 2/2 ulcers vs UTI  #Delirium  #Acute renal failure  #Penile ulcer  #Sacral ulcer  #Diabetic foot ulcer  #Elevated INR  #Elevated LFTs    -NEURO  Patient AAOx3  Lethargy was likely delirium due to infection, now resolving   CTH showed  Moderate periventricular and deep white matter ischemia. Encephalomalacia and gliosis in RIGHT occipital lobe. Global atrophy. 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    -PULMONARY  No active issues at present    -CVS  #Septic shock likely 2/2 ulcers vs UTI  Patient presented with hypotension  Was given 3 L bolus in ED, BP was still low  S/p 6L of IVF total   C/w Levophed, off Vasopressin   C/w stress dose of Solu-cortef 50 mg q12 hrs, deescalating   Switched to Linezolid and Zosyn   No more fluids for now, Will monitor UO   UA +ve and Ucx testing  ID Dr Randhawa on board     #PAF  - Patient has hx of PAF on Eliquis 5 mg BID at home  - C/w Eliquis 2.5 mg BID for now while ATN resolves     -GI  Has elevated INR and AST  Likely 2/2 septic shock  Will monitor CMP  Diet Puree renal diet    -RENAL  #Acute renal failure- around 4.2  Patient has normal creatinine at baseline, 1.18 Nov 2021  Presented with elevated BUN/cr of 51/4.45  Most likely ATN from hypoperfusion   S/p 6L bolus, No more fluids for now  Will monitor UO and BMP  Nephro consulted    -ID  Septic shock 2/2 ulcers vs UTI  Patient has septic shock 2/2 SSTI  C/w Linezolid and Zosyn   UA +ve and Ucx testing  ID Dr Randhawa on board    -ENDO  DM  Patient has h/o IDDM, on 10 units of Lantus at bedtime  Diet Puree renal diet   Monitor ACHS    -SKIN  Patient has stage 3/4 sacral ulcer, penile ulcer, foot ulcer  C/w Linezolid and Zosyn  Wound care on board  ID Dr Randhawa on board  Dr Garcia consulted   on board    -Prophylactic measure  C/w Eliquis dose adjusted to 2.5 BID  C/w ppi    -GOC   Full Code

## 2022-01-20 NOTE — PROGRESS NOTE ADULT - SUBJECTIVE AND OBJECTIVE BOX
INTERVAL HPI/OVERNIGHT EVENTS: ***    PRESSORS: [x ] YES [ ] NO  WHICH:    Antimicrobial:  linezolid  IVPB 600 milliGRAM(s) IV Intermittent every 12 hours  linezolid  IVPB      piperacillin/tazobactam IVPB.. 3.375 Gram(s) IV Intermittent every 12 hours    Cardiovascular:  norepinephrine Infusion 0.5 MICROgram(s)/kG/Min IV Continuous <Continuous>    Pulmonary:    Hematalogic:  apixaban 2.5 milliGRAM(s) Oral two times a day  aspirin  chewable 81 milliGRAM(s) Oral daily    Other:  atorvastatin 40 milliGRAM(s) Oral at bedtime  chlorhexidine 2% Cloths 1 Application(s) Topical daily  finasteride 5 milliGRAM(s) Oral daily  gabapentin 100 milliGRAM(s) Oral three times a day  hydrocortisone sodium succinate Injectable 50 milliGRAM(s) IV Push every 8 hours  influenza  Vaccine (HIGH DOSE) 0.7 milliLiter(s) IntraMuscular once  insulin lispro (ADMELOG) corrective regimen sliding scale   SubCutaneous Before meals and at bedtime  pantoprazole    Tablet 40 milliGRAM(s) Oral before breakfast  sodium chloride 0.9% lock flush 10 milliLiter(s) IV Push every 1 hour PRN  vasopressin Infusion 0.04 Unit(s)/Min IV Continuous <Continuous>    apixaban 2.5 milliGRAM(s) Oral two times a day  aspirin  chewable 81 milliGRAM(s) Oral daily  atorvastatin 40 milliGRAM(s) Oral at bedtime  chlorhexidine 2% Cloths 1 Application(s) Topical daily  finasteride 5 milliGRAM(s) Oral daily  gabapentin 100 milliGRAM(s) Oral three times a day  hydrocortisone sodium succinate Injectable 50 milliGRAM(s) IV Push every 8 hours  influenza  Vaccine (HIGH DOSE) 0.7 milliLiter(s) IntraMuscular once  insulin lispro (ADMELOG) corrective regimen sliding scale   SubCutaneous Before meals and at bedtime  linezolid  IVPB 600 milliGRAM(s) IV Intermittent every 12 hours  linezolid  IVPB      norepinephrine Infusion 0.5 MICROgram(s)/kG/Min IV Continuous <Continuous>  pantoprazole    Tablet 40 milliGRAM(s) Oral before breakfast  piperacillin/tazobactam IVPB.. 3.375 Gram(s) IV Intermittent every 12 hours  sodium chloride 0.9% lock flush 10 milliLiter(s) IV Push every 1 hour PRN  vasopressin Infusion 0.04 Unit(s)/Min IV Continuous <Continuous>    Drug Dosing Weight  Height (cm): 170.2 (2022 11:33)  Weight (kg): 60.7 (2022 16:10)  BMI (kg/m2): 21 (2022 16:10)  BSA (m2): 1.7 (2022 16:10)    CENTRAL LINE: [ ] YES [ ] NO  LOCATION:         CASTAÑEDA: [ ] YES [ ] NO          A-LINE:  [ ] YES [ ] NO  LOCATION:             ICU Vital Signs Last 24 Hrs  T(C): 36.1 (2022 04:00), Max: 36.2 (2022 23:00)  T(F): 96.9 (2022 04:00), Max: 97.2 (2022 23:00)  HR: 78 (2022 08:16) (70 - 83)  BP: 111/61 (2022 08:16) (87/53 - 133/71)  BP(mean): 73 (2022 08:16) (59 - 91)  ABP: --  ABP(mean): --  RR: 11 (2022 08:16) (10 - 23)  SpO2: 100% (2022 08:16) (98% - 100%)             @ 07:01  -  -20 @ 07:00  --------------------------------------------------------  IN: 1106.1 mL / OUT: 300 mL / NET: 806.1 mL              PHYSICAL EXAM:    GENERAL: NAD  EYES: EOMI, PERRLA  NECK: Supple, No JVD; Normal thyroid; Trachea midline: No LAD   NERVOUS SYSTEM:  Alert & Oriented X3,  Motor Strength 5/5 B/L upper and lower extremities; DTRs 2+ intact and symmetric  CHEST/LUNG: No rales, rhonchi, wheezing, breath sounds present bilaterally  HEART: Regular rate and rhythm; No murmurs, no gallops  ABDOMEN: Soft, Nontender, Nondistended; Bowel sounds present, no pain or masses on palpation  : voiding well, Castañeda in place  EXTREMITIES:  2+ Peripheral Pulses, No clubbing, cyanosis, or edema  SKIN: warm, intact, no lesions         LABS:  CBC Full  -  ( 2022 04:25 )  WBC Count : 9.24 K/uL  RBC Count : 3.01 M/uL  Hemoglobin : 8.8 g/dL  Hematocrit : 26.0 %  Platelet Count - Automated : 195 K/uL  Mean Cell Volume : 86.4 fl  Mean Cell Hemoglobin : 29.2 pg  Mean Cell Hemoglobin Concentration : 33.8 gm/dL  Auto Neutrophil # : x  Auto Lymphocyte # : x  Auto Monocyte # : x  Auto Eosinophil # : x  Auto Basophil # : x  Auto Neutrophil % : x  Auto Lymphocyte % : x  Auto Monocyte % : x  Auto Eosinophil % : x  Auto Basophil % : x        135  |  102  |  54<H>  ----------------------------<  193<H>  3.7   |  19<L>  |  4.27<H>    Ca    7.8<L>      2022 04:25  Phos  4.9       Mg     2.2         TPro  5.7<L>  /  Alb  1.6<L>  /  TBili  0.4  /  DBili  x   /  AST  54<H>  /  ALT  30  /  AlkPhos  103        Urinalysis Basic - ( 2022 07:32 )    Color: Yellow / Appearance: very cloudy / S.020 / pH: x  Gluc: x / Ketone: Negative  / Bili: Negative / Urobili: Negative   Blood: x / Protein: 100 / Nitrite: Negative   Leuk Esterase: Moderate / RBC: >50 /HPF / WBC >50 /HPF   Sq Epi: x / Non Sq Epi: Occasional /HPF / Bacteria: See Note /HPF      Culture Results:   No growth to date. ( @ 17:49)  Culture Results:   No growth to date. ( @ 17:49)      RADIOLOGY & ADDITIONAL STUDIES REVIEWED:  ***    [ ]GOALS OF CARE DISCUSSION WITH PATIENT/FAMILY/PROXY:    CRITICAL CARE TIME SPENT: 35 minutes   INTERVAL HPI/OVERNIGHT EVENTS: patient's pressors requirements are decreasing.    PRESSORS: [x ] YES [ ] NO  WHICH: Levophed    Antimicrobial:  linezolid  IVPB 600 milliGRAM(s) IV Intermittent every 12 hours  linezolid  IVPB      piperacillin/tazobactam IVPB.. 3.375 Gram(s) IV Intermittent every 12 hours    Cardiovascular:  norepinephrine Infusion 0.5 MICROgram(s)/kG/Min IV Continuous <Continuous>    Pulmonary:    Hematalogic:  apixaban 2.5 milliGRAM(s) Oral two times a day  aspirin  chewable 81 milliGRAM(s) Oral daily    Other:  atorvastatin 40 milliGRAM(s) Oral at bedtime  chlorhexidine 2% Cloths 1 Application(s) Topical daily  finasteride 5 milliGRAM(s) Oral daily  gabapentin 100 milliGRAM(s) Oral three times a day  hydrocortisone sodium succinate Injectable 50 milliGRAM(s) IV Push every 8 hours  influenza  Vaccine (HIGH DOSE) 0.7 milliLiter(s) IntraMuscular once  insulin lispro (ADMELOG) corrective regimen sliding scale   SubCutaneous Before meals and at bedtime  pantoprazole    Tablet 40 milliGRAM(s) Oral before breakfast  sodium chloride 0.9% lock flush 10 milliLiter(s) IV Push every 1 hour PRN  vasopressin Infusion 0.04 Unit(s)/Min IV Continuous <Continuous>    apixaban 2.5 milliGRAM(s) Oral two times a day  aspirin  chewable 81 milliGRAM(s) Oral daily  atorvastatin 40 milliGRAM(s) Oral at bedtime  chlorhexidine 2% Cloths 1 Application(s) Topical daily  finasteride 5 milliGRAM(s) Oral daily  gabapentin 100 milliGRAM(s) Oral three times a day  hydrocortisone sodium succinate Injectable 50 milliGRAM(s) IV Push every 8 hours  influenza  Vaccine (HIGH DOSE) 0.7 milliLiter(s) IntraMuscular once  insulin lispro (ADMELOG) corrective regimen sliding scale   SubCutaneous Before meals and at bedtime  linezolid  IVPB 600 milliGRAM(s) IV Intermittent every 12 hours  linezolid  IVPB      norepinephrine Infusion 0.5 MICROgram(s)/kG/Min IV Continuous <Continuous>  pantoprazole    Tablet 40 milliGRAM(s) Oral before breakfast  piperacillin/tazobactam IVPB.. 3.375 Gram(s) IV Intermittent every 12 hours  sodium chloride 0.9% lock flush 10 milliLiter(s) IV Push every 1 hour PRN  vasopressin Infusion 0.04 Unit(s)/Min IV Continuous <Continuous>    Drug Dosing Weight  Height (cm): 170.2 (2022 11:33)  Weight (kg): 60.7 (2022 16:10)  BMI (kg/m2): 21 (2022 16:10)  BSA (m2): 1.7 (2022 16:10)    CENTRAL LINE: [ ] YES [ ] NO  LOCATION:         CASTAÑEDA: [ ] YES [ ] NO          A-LINE:  [ ] YES [ ] NO  LOCATION:             ICU Vital Signs Last 24 Hrs  T(C): 36.1 (2022 04:00), Max: 36.2 (2022 23:00)  T(F): 96.9 (2022 04:00), Max: 97.2 (2022 23:00)  HR: 78 (2022 08:16) (70 - 83)  BP: 111/61 (2022 08:16) (87/53 - 133/71)  BP(mean): 73 (2022 08:16) (59 - 91)  ABP: --  ABP(mean): --  RR: 11 (2022 08:16) (10 - 23)  SpO2: 100% (2022 08:16) (98% - 100%)             @ 07:01  -  01-20 @ 07:00  --------------------------------------------------------  IN: 1106.1 mL / OUT: 300 mL / NET: 806.1 mL              PHYSICAL EXAM:    GENERAL: NAD, RIJ, cachectic, temporal wasting  EYES: EOMI, PERRLA  NECK: Supple, No JVD; Normal thyroid; Trachea midline: No LAD   NERVOUS SYSTEM:  Alert & Oriented X3,  Motor Strength 5/5 B/L upper and lower extremities; DTRs 2+ intact and symmetric  CHEST/LUNG: No rales, rhonchi, wheezing, breath sounds present bilaterally  HEART: Regular rate and rhythm; No murmurs, no gallops  ABDOMEN: Soft, Nontender, Nondistended; Bowel sounds present, no pain or masses on palpation  : Castañeda in place, penile ulcer  EXTREMITIES:  2+ Peripheral Pulses, No clubbing, cyanosis, or edema  SKIN: warm, multiple skin ulcer on back, lower extremities and penile area         LABS:  CBC Full  -  ( 2022 04:25 )  WBC Count : 9.24 K/uL  RBC Count : 3.01 M/uL  Hemoglobin : 8.8 g/dL  Hematocrit : 26.0 %  Platelet Count - Automated : 195 K/uL  Mean Cell Volume : 86.4 fl  Mean Cell Hemoglobin : 29.2 pg  Mean Cell Hemoglobin Concentration : 33.8 gm/dL  Auto Neutrophil # : x  Auto Lymphocyte # : x  Auto Monocyte # : x  Auto Eosinophil # : x  Auto Basophil # : x  Auto Neutrophil % : x  Auto Lymphocyte % : x  Auto Monocyte % : x  Auto Eosinophil % : x  Auto Basophil % : x        135  |  102  |  54<H>  ----------------------------<  193<H>  3.7   |  19<L>  |  4.27<H>    Ca    7.8<L>      2022 04:25  Phos  4.9       Mg     2.2         TPro  5.7<L>  /  Alb  1.6<L>  /  TBili  0.4  /  DBili  x   /  AST  54<H>  /  ALT  30  /  AlkPhos  103        Urinalysis Basic - ( 2022 07:32 )    Color: Yellow / Appearance: very cloudy / S.020 / pH: x  Gluc: x / Ketone: Negative  / Bili: Negative / Urobili: Negative   Blood: x / Protein: 100 / Nitrite: Negative   Leuk Esterase: Moderate / RBC: >50 /HPF / WBC >50 /HPF   Sq Epi: x / Non Sq Epi: Occasional /HPF / Bacteria: See Note /HPF      Culture Results:   No growth to date. ( @ 17:49)  Culture Results:   No growth to date. ( @ 17:49)      RADIOLOGY & ADDITIONAL STUDIES REVIEWED:  ***    [ ]GOALS OF CARE DISCUSSION WITH PATIENT/FAMILY/PROXY:    CRITICAL CARE TIME SPENT: 35 minutes

## 2022-01-21 LAB
ALBUMIN SERPL ELPH-MCNC: 1.7 G/DL — LOW (ref 3.5–5)
ALP SERPL-CCNC: 106 U/L — SIGNIFICANT CHANGE UP (ref 40–120)
ALT FLD-CCNC: 33 U/L DA — SIGNIFICANT CHANGE UP (ref 10–60)
ANION GAP SERPL CALC-SCNC: 12 MMOL/L — SIGNIFICANT CHANGE UP (ref 5–17)
AST SERPL-CCNC: 52 U/L — HIGH (ref 10–40)
BILIRUB SERPL-MCNC: 0.4 MG/DL — SIGNIFICANT CHANGE UP (ref 0.2–1.2)
BUN SERPL-MCNC: 65 MG/DL — HIGH (ref 7–18)
CALCIUM SERPL-MCNC: 7.6 MG/DL — LOW (ref 8.4–10.5)
CHLORIDE SERPL-SCNC: 101 MMOL/L — SIGNIFICANT CHANGE UP (ref 96–108)
CO2 SERPL-SCNC: 22 MMOL/L — SIGNIFICANT CHANGE UP (ref 22–31)
CREAT SERPL-MCNC: 4.34 MG/DL — HIGH (ref 0.5–1.3)
GLUCOSE BLDC GLUCOMTR-MCNC: 147 MG/DL — HIGH (ref 70–99)
GLUCOSE BLDC GLUCOMTR-MCNC: 165 MG/DL — HIGH (ref 70–99)
GLUCOSE BLDC GLUCOMTR-MCNC: 175 MG/DL — HIGH (ref 70–99)
GLUCOSE BLDC GLUCOMTR-MCNC: 247 MG/DL — HIGH (ref 70–99)
GLUCOSE SERPL-MCNC: 210 MG/DL — HIGH (ref 70–99)
HCT VFR BLD CALC: 29.6 % — LOW (ref 39–50)
HGB BLD-MCNC: 9.9 G/DL — LOW (ref 13–17)
MAGNESIUM SERPL-MCNC: 2 MG/DL — SIGNIFICANT CHANGE UP (ref 1.6–2.6)
MCHC RBC-ENTMCNC: 28.8 PG — SIGNIFICANT CHANGE UP (ref 27–34)
MCHC RBC-ENTMCNC: 33.4 GM/DL — SIGNIFICANT CHANGE UP (ref 32–36)
MCV RBC AUTO: 86 FL — SIGNIFICANT CHANGE UP (ref 80–100)
NRBC # BLD: 0 /100 WBCS — SIGNIFICANT CHANGE UP (ref 0–0)
PHOSPHATE SERPL-MCNC: 4.5 MG/DL — SIGNIFICANT CHANGE UP (ref 2.5–4.5)
PLATELET # BLD AUTO: 227 K/UL — SIGNIFICANT CHANGE UP (ref 150–400)
POTASSIUM SERPL-MCNC: 3.6 MMOL/L — SIGNIFICANT CHANGE UP (ref 3.5–5.3)
POTASSIUM SERPL-SCNC: 3.6 MMOL/L — SIGNIFICANT CHANGE UP (ref 3.5–5.3)
PROT SERPL-MCNC: 5.7 G/DL — LOW (ref 6–8.3)
RBC # BLD: 3.44 M/UL — LOW (ref 4.2–5.8)
RBC # FLD: 16.1 % — HIGH (ref 10.3–14.5)
SODIUM SERPL-SCNC: 135 MMOL/L — SIGNIFICANT CHANGE UP (ref 135–145)
WBC # BLD: 7.91 K/UL — SIGNIFICANT CHANGE UP (ref 3.8–10.5)
WBC # FLD AUTO: 7.91 K/UL — SIGNIFICANT CHANGE UP (ref 3.8–10.5)

## 2022-01-21 RX ORDER — NOREPINEPHRINE BITARTRATE/D5W 8 MG/250ML
0.5 PLASTIC BAG, INJECTION (ML) INTRAVENOUS
Qty: 16 | Refills: 0 | Status: DISCONTINUED | OUTPATIENT
Start: 2022-01-21 | End: 2022-01-24

## 2022-01-21 RX ORDER — FLUCONAZOLE 150 MG/1
200 TABLET ORAL DAILY
Refills: 0 | Status: DISCONTINUED | OUTPATIENT
Start: 2022-01-21 | End: 2022-01-21

## 2022-01-21 RX ORDER — MIDODRINE HYDROCHLORIDE 2.5 MG/1
5 TABLET ORAL EVERY 8 HOURS
Refills: 0 | Status: DISCONTINUED | OUTPATIENT
Start: 2022-01-21 | End: 2022-01-21

## 2022-01-21 RX ORDER — FLUCONAZOLE 150 MG/1
100 TABLET ORAL DAILY
Refills: 0 | Status: COMPLETED | OUTPATIENT
Start: 2022-01-22 | End: 2022-01-25

## 2022-01-21 RX ORDER — MIDODRINE HYDROCHLORIDE 2.5 MG/1
5 TABLET ORAL EVERY 8 HOURS
Refills: 0 | Status: DISCONTINUED | OUTPATIENT
Start: 2022-01-21 | End: 2022-01-26

## 2022-01-21 RX ORDER — MIDODRINE HYDROCHLORIDE 2.5 MG/1
50 TABLET ORAL EVERY 8 HOURS
Refills: 0 | Status: DISCONTINUED | OUTPATIENT
Start: 2022-01-21 | End: 2022-01-21

## 2022-01-21 RX ORDER — ALBUMIN HUMAN 25 %
50 VIAL (ML) INTRAVENOUS EVERY 6 HOURS
Refills: 0 | Status: COMPLETED | OUTPATIENT
Start: 2022-01-21 | End: 2022-01-22

## 2022-01-21 RX ORDER — MIRTAZAPINE 45 MG/1
7.5 TABLET, ORALLY DISINTEGRATING ORAL DAILY
Refills: 0 | Status: DISCONTINUED | OUTPATIENT
Start: 2022-01-21 | End: 2022-02-10

## 2022-01-21 RX ADMIN — Medication 2: at 11:36

## 2022-01-21 RX ADMIN — Medication 50 MILLILITER(S): at 17:19

## 2022-01-21 RX ADMIN — Medication 81 MILLIGRAM(S): at 11:12

## 2022-01-21 RX ADMIN — Medication 50 MILLILITER(S): at 23:48

## 2022-01-21 RX ADMIN — APIXABAN 2.5 MILLIGRAM(S): 2.5 TABLET, FILM COATED ORAL at 05:00

## 2022-01-21 RX ADMIN — Medication 50 MILLIGRAM(S): at 05:00

## 2022-01-21 RX ADMIN — MIRTAZAPINE 7.5 MILLIGRAM(S): 45 TABLET, ORALLY DISINTEGRATING ORAL at 12:20

## 2022-01-21 RX ADMIN — LINEZOLID 300 MILLIGRAM(S): 600 INJECTION, SOLUTION INTRAVENOUS at 05:00

## 2022-01-21 RX ADMIN — APIXABAN 2.5 MILLIGRAM(S): 2.5 TABLET, FILM COATED ORAL at 17:17

## 2022-01-21 RX ADMIN — PIPERACILLIN AND TAZOBACTAM 25 GRAM(S): 4; .5 INJECTION, POWDER, LYOPHILIZED, FOR SOLUTION INTRAVENOUS at 17:19

## 2022-01-21 RX ADMIN — Medication 50 MILLILITER(S): at 11:33

## 2022-01-21 RX ADMIN — Medication 50 MILLIGRAM(S): at 17:18

## 2022-01-21 RX ADMIN — CHLORHEXIDINE GLUCONATE 1 APPLICATION(S): 213 SOLUTION TOPICAL at 11:33

## 2022-01-21 RX ADMIN — MIDODRINE HYDROCHLORIDE 5 MILLIGRAM(S): 2.5 TABLET ORAL at 22:01

## 2022-01-21 RX ADMIN — PIPERACILLIN AND TAZOBACTAM 25 GRAM(S): 4; .5 INJECTION, POWDER, LYOPHILIZED, FOR SOLUTION INTRAVENOUS at 05:01

## 2022-01-21 RX ADMIN — FLUCONAZOLE 200 MILLIGRAM(S): 150 TABLET ORAL at 12:20

## 2022-01-21 RX ADMIN — PANTOPRAZOLE SODIUM 40 MILLIGRAM(S): 20 TABLET, DELAYED RELEASE ORAL at 05:00

## 2022-01-21 RX ADMIN — MIDODRINE HYDROCHLORIDE 5 MILLIGRAM(S): 2.5 TABLET ORAL at 11:33

## 2022-01-21 RX ADMIN — MUPIROCIN 1 APPLICATION(S): 20 OINTMENT TOPICAL at 05:01

## 2022-01-21 RX ADMIN — MUPIROCIN 1 APPLICATION(S): 20 OINTMENT TOPICAL at 17:20

## 2022-01-21 RX ADMIN — FINASTERIDE 5 MILLIGRAM(S): 5 TABLET, FILM COATED ORAL at 11:12

## 2022-01-21 RX ADMIN — Medication 4: at 05:01

## 2022-01-21 RX ADMIN — Medication 2: at 17:31

## 2022-01-21 RX ADMIN — ATORVASTATIN CALCIUM 40 MILLIGRAM(S): 80 TABLET, FILM COATED ORAL at 22:01

## 2022-01-21 NOTE — PROGRESS NOTE ADULT - ASSESSMENT
Septic Shock - possible source could be his decubitus/ vascular ulcers but does not appear so.  Leukocytosis - improving    Plan -   Cont Zosyn 3.375gms iv q12hrs     Time spent - 31 mins Septic Shock - possible source could be his decubitus/ vascular ulcers but does not appear so.  Leukocytosis - improving    Plan - Can decrease Diflucan to 100mg po daily  Cont Zosyn 3.375gms iv q12hrs     Time spent - 31 mins Septic Shock - possible source could be his decubitus/ vascular ulcers but does not appear so.  Leukocytosis - improving    Plan - Can decrease Diflucan to 100mg po daily  Cont Zosyn 3.375gms iv q12hrs     Time spent - 31 mins    I agree with above

## 2022-01-21 NOTE — PROGRESS NOTE ADULT - SUBJECTIVE AND OBJECTIVE BOX
NEPHROLOGY MEDICAL CARE, [facility] - Dr. Tariq Herrera/ Dr. Lauren Lopez/ Dr. Dre Call/ Dr. Destiny Knott    Patient was seen and examined at bedside.    CC: patient is okay and still requiring pressor.     Vital Signs Last 24 Hrs  T(C): 36.1 (21 Jan 2022 04:00), Max: 36.6 (20 Jan 2022 19:41)  T(F): 97 (21 Jan 2022 04:00), Max: 97.9 (20 Jan 2022 19:41)  HR: 69 (21 Jan 2022 12:15) (60 - 82)  BP: 88/50 (21 Jan 2022 12:15) (65/41 - 145/80)  BP(mean): 57 (21 Jan 2022 12:15) (45 - 97)  RR: 16 (21 Jan 2022 12:15) (8 - 19)  SpO2: 100% (21 Jan 2022 12:15) (98% - 100%)    01-20 @ 07:01  -  01-21 @ 07:00  --------------------------------------------------------  IN: 1331.4 mL / OUT: 415 mL / NET: 916.4 mL        PHYSICAL EXAM:  General: No acute respiratory distress.  Eyes: conjunctiva and sclera clear  ENMT: Atraumatic, Normocephalic, supple, No JVD present. Moist mucous membranes  Respiratory: Bilateral poor air entry; No rales, rhonchi, wheezing  Cardiovassular: S1S2+; no m/r/g  Gastrointestinal: Soft, Non-tender, Nondistended; Bowel sounds present  : ibrahim's cath with brown colored urine  Neuro:  Awake but confused..   Ext: trace edema  Skin: No visible rashes    MEDICATIONS:  MEDICATIONS  (STANDING):  albumin human 25% IVPB 50 milliLiter(s) IV Intermittent every 6 hours  apixaban 2.5 milliGRAM(s) Oral two times a day  aspirin  chewable 81 milliGRAM(s) Oral daily  atorvastatin 40 milliGRAM(s) Oral at bedtime  chlorhexidine 2% Cloths 1 Application(s) Topical daily  finasteride 5 milliGRAM(s) Oral daily  hydrocortisone sodium succinate Injectable 50 milliGRAM(s) IV Push every 12 hours  influenza  Vaccine (HIGH DOSE) 0.7 milliLiter(s) IntraMuscular once  insulin lispro (ADMELOG) corrective regimen sliding scale   SubCutaneous Before meals and at bedtime  midodrine 5 milliGRAM(s) Oral every 8 hours  mirtazapine 7.5 milliGRAM(s) Oral daily  mupirocin 2% Ointment 1 Application(s) Topical two times a day  norepinephrine Infusion 0.501 MICROgram(s)/kG/Min (28.5 mL/Hr) IV Continuous <Continuous>  pantoprazole    Tablet 40 milliGRAM(s) Oral before breakfast  piperacillin/tazobactam IVPB.. 3.375 Gram(s) IV Intermittent every 12 hours    MEDICATIONS  (PRN):          LABS:                        9.9    7.91  )-----------( 227      ( 21 Jan 2022 04:17 )             29.6     01-21    135  |  101  |  65<H>  ----------------------------<  210<H>  3.6   |  22  |  4.34<H>    Ca    7.6<L>      21 Jan 2022 04:17  Phos  4.5     01-21  Mg     2.0     01-21    TPro  5.7<L>  /  Alb  1.7<L>  /  TBili  0.4  /  DBili  x   /  AST  52<H>  /  ALT  33  /  AlkPhos  106  01-21        Magnesium, Serum: 2.0 mg/dL (01-21 @ 04:17)  Phosphorus Level, Serum: 4.5 mg/dL (01-21 @ 04:17)    Urine studies    PTH and Vit D:

## 2022-01-21 NOTE — PROGRESS NOTE ADULT - ASSESSMENT
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 Patient received 3L bolus in ED, s/p BP was still low 70/30s. Patient admitted to ICU for septic shock requiring pressors.     #Septic shock 2/2 ulcers vs UTI  #Delirium  #Acute renal failure  #Penile ulcer  #Sacral ulcer  #Diabetic foot ulcer  #Elevated INR  #Elevated LFTs    -NEURO  Patient AAOx3  Lethargy was likely delirium due to infection, now resolving   CTH showed  Moderate periventricular and deep white matter ischemia. Encephalomalacia and gliosis in RIGHT occipital lobe. Global atrophy. 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    -PULMONARY  No active issues at present    -CVS  #Septic shock likely 2/2 ulcers vs UTI  Patient presented with hypotension  Was given 3 L bolus in ED, BP was still low  S/p 6L of IVF total   C/w Levophed, off Vasopressin   C/w stress dose of Solu-cortef 50 mg q12 hrs, deescalating   Switched to Linezolid and Zosyn   No more fluids for now, Will monitor UO   UA +ve and Ucx testing  ID Dr Randhawa on board     -GI  Has elevated INR and AST  Likely 2/2 septic shock  Will monitor CMP  Diet Puree renal diet    -RENAL  #Acute renal failure- around 4.2  Patient has normal creatinine at baseline, 1.18 Nov 2021  Presented with elevated BUN/cr of 51/4.45  Most likely ATN from hypoperfusion   S/p 6L bolus, No more fluids for now  Will monitor UO and BMP    -ID  Septic shock 2/2 ulcers vs UTI  Patient has septic shock 2/2 SSTI  C/w Linezolid and Zosyn   UA +ve and Ucx testing  ID Dr Randhawa on board    -ENDO  DM  Patient has h/o IDDM, on 10 units of Lantus at bedtime  Diet Puree renal diet   Monitor ACHS    -SKIN  Patient has stage 3/4 sacral ulcer, penile ulcer, foot ulcer  Wound care on board  Dr Garcia consulted  C/w Linezolid and Zosyn  ID Dr Randhawa on board   on board    -Prophylactic measure  C/w Eliquis dose adjusted to 2.5 BID  C/w ppi    -GOC   Full Code  75 yo male from [facility] assisted living with medical history of DM on insulin, HTN, HLD, CAD S/p CABG, 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 Patient received 3L bolus in ED, s/p BP was still low 70/30s. Patient admitted to ICU for septic shock requiring pressors.     #Septic shock 2/2 ulcers vs UTI  #Delirium  #Acute renal failure  #Penile ulcer  #Sacral ulcer  #Diabetic foot ulcer  #Elevated INR  #Elevated LFTs    -NEURO  Patient AAOx3  Lethargy was likely delirium due to infection, now resolving   CTH showed  Moderate periventricular and deep white matter ischemia. Encephalomalacia and gliosis in RIGHT occipital lobe. Global atrophy. 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    -PULMONARY  No active issues at present    -CVS  #Septic shock likely 2/2 ulcers vs UTI  Patient presented with hypotension  Was given 3 L bolus in ED, BP was still low  S/p 6L of IVF total   C/w Levophed, off Vasopressin -- started midodrine 50 q8hrs   C/w stress dose of Solu-cortef 50 mg q12 hrs, deescalating   C/w Zosyn, D/c Linezolid today 1/21/22  Started on Diflucan 100 mg qd for 5 days   No more fluids for now, Will monitor UO  Started IV albumin x 4 doses   UA +ve and Ucx yeast like   ID Dr Randhawa on board     -GI  Has elevated INR and AST  Likely 2/2 septic shock  Will monitor CMP  Diet Puree renal diet    -RENAL  #Acute renal failure- around 4.2  Patient has normal creatinine at baseline, 1.18 Nov 2021  Presented with elevated BUN/cr of 51/4.45  Most likely ATN from hypoperfusion   S/p 6L bolus, No more fluids for now  Will monitor UO and BMP  Nephro on board     -ID  Septic shock 2/2 ulcers vs UTI  Patient has septic shock 2/2 SSTI  C/w Zosyn, D/c Linezolid today 1/21/22  Started on Diflucan 100 mg qd for 5 days   UA +ve and Ucx yeast like   ID Dr Randhawa on board    -ENDO  DM  Patient has h/o IDDM, on 10 units of Lantus at bedtime  Diet Puree renal diet   Monitor ACHS, FS before meals and at bedtime   HSS  Started Remeron for appetite     -SKIN  Patient has stage 3/4 sacral ulcer, penile ulcer, foot ulcer  Wound care on board  Dr Garcia consulted  C/w Linezolid and Zosyn  ID Dr Randhawa on board   on board    -Prophylactic measure  C/w Eliquis dose adjusted to 2.5 BID  C/w ppi    -GOC   Full Code  77 yo male from [facility] assisted living with medical history of DM on insulin, HTN, HLD, CAD S/p CABG, 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 Patient received 3L bolus in ED, s/p BP was still low 70/30s. Patient admitted to ICU for septic shock requiring pressors.     #Septic shock 2/2 ulcers vs UTI  #Delirium  #Acute renal failure  #Penile ulcer  #Sacral ulcer  #Diabetic foot ulcer  #Elevated INR  #Elevated LFTs    -NEURO  Patient AAOx3  Lethargy was likely delirium due to infection, now resolving   CTH showed  Moderate periventricular and deep white matter ischemia. Encephalomalacia and gliosis in RIGHT occipital lobe. Global atrophy. 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    -PULMONARY  No active issues at present    -CVS  #Septic shock likely 2/2 ulcers vs UTI  Patient presented with hypotension  Was given 3 L bolus in ED, BP was still low  S/p 6L of IVF total   C/w Levophed, off Vasopressin -- started midodrine 5 mg q8hrs   C/w stress dose of Solu-cortef 50 mg q12 hrs, deescalating   C/w Zosyn, D/c Linezolid today 1/21/22  Started on Diflucan 100 mg qd for 5 days   No more fluids for now, Will monitor UO  Started IV albumin x 4 doses   UA +ve and Ucx yeast like   ID Dr Randhawa on board     -GI  Has elevated INR and AST  Likely 2/2 septic shock  Will monitor CMP  Diet Puree renal diet    -RENAL  #Acute renal failure- around 4.2  Patient has normal creatinine at baseline, 1.18 Nov 2021  Presented with elevated BUN/cr of 51/4.45  Most likely ATN from hypoperfusion   S/p 6L bolus, No more fluids for now  Will monitor UO and BMP  Nephro on board     -ID  Septic shock 2/2 ulcers vs UTI  Patient has septic shock 2/2 SSTI  C/w Zosyn, D/c Linezolid today 1/21/22  Started on Diflucan 100 mg qd for 5 days   UA +ve and Ucx yeast like   ID Dr Randhawa on board    -ENDO  DM  Patient has h/o IDDM, on 10 units of Lantus at bedtime  Diet Puree renal diet   Monitor ACHS, FS before meals and at bedtime   HSS  Started Remeron for appetite     -SKIN  Patient has stage 3/4 sacral ulcer, penile ulcer, foot ulcer  Wound care on board  Dr Garcia consulted  C/w Linezolid and Zosyn  ID Dr Randhawa on board   on board    -Prophylactic measure  C/w Eliquis dose adjusted to 2.5 BID  C/w ppi    -GOC   Full Code  75 yo male from [facility] assisted living with medical history of DM on insulin, HTN, HLD, CAD S/p CABG, 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 Patient received 3L bolus in ED, s/p BP was still low 70/30s. Patient admitted to ICU for septic shock requiring pressors.     #Septic shock 2/2 ulcers vs UTI  #Delirium  #Acute renal failure  #Penile ulcer  #Sacral ulcer  #Diabetic foot ulcer  #Elevated INR  #Elevated LFTs    -NEURO  Patient AAOx3  Lethargy was likely delirium due to infection, now resolving   CTH showed  Moderate periventricular and deep white matter ischemia. Encephalomalacia and gliosis in RIGHT occipital lobe. Global atrophy. 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    -PULMONARY  No active issues at present    -CVS  #Septic shock likely 2/2 ulcers vs UTI  Patient presented with hypotension  Was given 3 L bolus in ED, BP was still low  S/p 6L of IVF total   C/w Levophed, off Vasopressin -- started midodrine 5 mg q8hrs   C/w stress dose of Solu-cortef 50 mg q12 hrs, deescalating   C/w Zosyn, D/c Linezolid today 1/21/22  Started on Diflucan 100 mg qd for 5 days   No more fluids for now, Will monitor UO  Started IV albumin x 4 doses   UA +ve and Ucx yeast like   ID Dr Randhawa on board     #PAF  - Patient has hx of PAF on Eliquis 5 mg BID at home  - C/w Eliquis 2.5 mg BID for now while ATN resolves     -GI  Has elevated INR and AST  Likely 2/2 septic shock  Will monitor CMP  Diet Puree renal diet    -RENAL  #Acute renal failure- around 4.2  Patient has normal creatinine at baseline, 1.18 Nov 2021  Presented with elevated BUN/cr of 51/4.45  Most likely ATN from hypoperfusion   S/p 6L bolus, No more fluids for now  Will monitor UO and BMP  Nephro on board     -ID  Septic shock 2/2 ulcers vs UTI  Patient has septic shock 2/2 SSTI  C/w Zosyn, D/c Linezolid today 1/21/22  Started on Diflucan 100 mg qd for 5 days   UA +ve and Ucx yeast like   ID Dr Randhawa on board    -ENDO  DM  Patient has h/o IDDM, on 10 units of Lantus at bedtime  Diet Puree renal diet   Monitor ACHS, FS before meals and at bedtime   HSS  Started Remeron for appetite     -SKIN  Patient has stage 3/4 sacral ulcer, penile ulcer, foot ulcer  C/w Linezolid and Zosyn  Wound care on board  Dr Garcia on board   ID Dr Randhawa on board   on board    -Prophylactic measure  C/w Eliquis dose adjusted to 2.5 BID  C/w ppi    -GOC   Full Code

## 2022-01-21 NOTE — PROGRESS NOTE ADULT - ASSESSMENT
1. OREN due to ATN.  -scr slowly worsening but stable electrolytes; UO is fair and no urgent to HD.   -urinalysis shows granular casts. will do urine lytes (uosm, urine sodium, urine creatinine, chloride, potassium), spot protein to creatinine ratio  -Adjust meds to eGFR and avoid IV Gadolinium contrast,NSAIDs, and phosphate enema.  -Monitor I/O's daily.   -Monitor SMA daily.  2. CKD stage 3 most likely due to diabetic nephropathy  -Baseline Scr around 1.2mg/dL  -Keep patient euvolemic and renal diet  -Avoid Nephrotoxic Meds/ Agents such as (NSAIDs, IV contrast, Aminoglycosides such as gentamicin, -Gadolinium contrast, Phosphate containing enemas, etc..)  -Adjust Medications according to eGFR  3. Anemia: hb is stable. monitor CBC  4. Hypotension: on low dose pressor.  5. MBD: phos is okay    Patient is critically ill. Time Spent: 35mins.  Discussed the assessment and plan with ICU Team/Nurse

## 2022-01-21 NOTE — PROGRESS NOTE ADULT - SUBJECTIVE AND OBJECTIVE BOX
ICU visit:  76y Male is under our care for    REVIEW OF SYSTEMS:  [  ] Not able to elicit  General:	  Chest:	  GI:	  :  Skin:	  Musculoskeletal:	  Neuro:	    MEDS:  fluconAZOLE   Tablet 200 milliGRAM(s) Oral daily  piperacillin/tazobactam IVPB.. 3.375 Gram(s) IV Intermittent every 12 hours    ALLERGIES: Allergies    No Known Allergies    Intolerances        VITALS:  ICU Vital Signs Last 24 Hrs  T(C): 36.1 (21 Jan 2022 04:00), Max: 36.6 (20 Jan 2022 19:41)  T(F): 97 (21 Jan 2022 04:00), Max: 97.9 (20 Jan 2022 19:41)  HR: 66 (21 Jan 2022 10:45) (60 - 82)  BP: 107/58 (21 Jan 2022 10:45) (81/55 - 145/80)  BP(mean): 71 (21 Jan 2022 10:45) (51 - 97)  ABP: --  ABP(mean): --  RR: 10 (21 Jan 2022 10:45) (8 - 19)  SpO2: 100% (21 Jan 2022 10:45) (100% - 100%)      PHYSICAL EXAM:  HEENT:  Neck:  Respiratory:  Cardiovascular:  Gastrointestinal:  Extremities:  Skin:  Ortho:  Neuro:    LABS/DIAGNOSTIC TESTS:                        9.9    7.91  )-----------( 227      ( 21 Jan 2022 04:17 )             29.6     WBC Count: 7.91 K/uL (01-21 @ 04:17)  WBC Count: 9.24 K/uL (01-20 @ 04:25)  WBC Count: 10.87 K/uL (01-19 @ 04:03)  WBC Count: 11.76 K/uL (01-18 @ 03:56)  WBC Count: 12.65 K/uL (01-17 @ 12:12)    01-21    135  |  101  |  65<H>  ----------------------------<  210<H>  3.6   |  22  |  4.34<H>    Ca    7.6<L>      21 Jan 2022 04:17  Phos  4.5     01-21  Mg     2.0     01-21    TPro  5.7<L>  /  Alb  1.7<L>  /  TBili  0.4  /  DBili  x   /  AST  52<H>  /  ALT  33  /  AlkPhos  106  01-21          CULTURES:   Catheterized Catheterized  01-19 @ 11:06   10,000 - 49,000 CFU/mL Yeast like cells "Susceptibilities not performed"  --  --      .Blood Blood-Peripheral  01-17 @ 17:49   No growth to date.  --  --      .Surgical Swab left foot wound  11-02 @ 13:15   Few Staphylococcus epidermidis  "Susceptibilities not performed"  --  --        RADIOLOGY:  no new studies ICU visit:  76y Male is under our care for septic shock and leukocytosis.  Patient was seen in the ICU laying comfortably in bed with no acute distress and remains on one pressor.  He remains afebrile, blood cultures are negative however urine culture is growing out yeast.    REVIEW OF SYSTEMS:  [ x ] Not able to elicit    MEDS:  fluconAZOLE   Tablet 200 milliGRAM(s) Oral daily  piperacillin/tazobactam IVPB.. 3.375 Gram(s) IV Intermittent every 12 hours    ALLERGIES: Allergies    No Known Allergies    Intolerances        VITALS:  ICU Vital Signs Last 24 Hrs  T(C): 36.1 (21 Jan 2022 04:00), Max: 36.6 (20 Jan 2022 19:41)  T(F): 97 (21 Jan 2022 04:00), Max: 97.9 (20 Jan 2022 19:41)  HR: 66 (21 Jan 2022 10:45) (60 - 82)  BP: 107/58 (21 Jan 2022 10:45) (81/55 - 145/80)  BP(mean): 71 (21 Jan 2022 10:45) (51 - 97)  ABP: --  ABP(mean): --  RR: 10 (21 Jan 2022 10:45) (8 - 19)  SpO2: 100% (21 Jan 2022 10:45) (100% - 100%)      PHYSICAL EXAM:  HEENT: n/a  Neck: supple, right IJ  Respiratory: lungs clear no rales  Cardiovascular: S1 S2 reg no murmurs  Gastrointestinal: +BS with soft, nondistended abdomen; nontender  : Molina catheter in place with cloudy urine  Extremities: no edema  Skin: Bilateral feet necrotic ulcers  Ortho: n/a  Neuro: Awake and alert    LABS/DIAGNOSTIC TESTS:                        9.9    7.91  )-----------( 227      ( 21 Jan 2022 04:17 )             29.6     WBC Count: 7.91 K/uL (01-21 @ 04:17)  WBC Count: 9.24 K/uL (01-20 @ 04:25)  WBC Count: 10.87 K/uL (01-19 @ 04:03)  WBC Count: 11.76 K/uL (01-18 @ 03:56)  WBC Count: 12.65 K/uL (01-17 @ 12:12)    01-21    135  |  101  |  65<H>  ----------------------------<  210<H>  3.6   |  22  |  4.34<H>    Ca    7.6<L>      21 Jan 2022 04:17  Phos  4.5     01-21  Mg     2.0     01-21    TPro  5.7<L>  /  Alb  1.7<L>  /  TBili  0.4  /  DBili  x   /  AST  52<H>  /  ALT  33  /  AlkPhos  106  01-21          CULTURES:   Catheterized Catheterized  01-19 @ 11:06   10,000 - 49,000 CFU/mL Yeast like cells "Susceptibilities not performed"  --  --      .Blood Blood-Peripheral  01-17 @ 17:49   No growth to date.  --  --      .Surgical Swab left foot wound  11-02 @ 13:15   Few Staphylococcus epidermidis  "Susceptibilities not performed"  --  --        RADIOLOGY:  no new studies

## 2022-01-21 NOTE — CONSULT NOTE ADULT - SUBJECTIVE AND OBJECTIVE BOX
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 Patient is alert and oriented on encounter. Patient states that he cut his foot and his back was hurting. Patient is unable to provide much history due to discomfort. Patient has stage 3 ulcers on sacrum, penile ulcer. Also has chronic wounds on foot. He was found to be hypotensive on field 60/30. He was given 3L bolus in ED after which BP was still low 70/30. Patient denies fever. As per AL records no h/o abdominal pain, nausea, vomiting, diarrhea, cough.  (17 Jan 2022 15:23)      ICU Vital Signs Last 24 Hrs  T(C): 35.8 (21 Jan 2022 17:30), Max: 36.1 (21 Jan 2022 04:00)  T(F): 96.5 (21 Jan 2022 17:30), Max: 97 (21 Jan 2022 04:00)  HR: 74 (21 Jan 2022 21:15) (60 - 86)  BP: 126/64 (21 Jan 2022 21:15) (53/38 - 145/80)  BP(mean): 79 (21 Jan 2022 21:15) (39 - 97)  ABP: --  ABP(mean): --  RR: 13 (21 Jan 2022 21:15) (10 - 20)  SpO2: 100% (21 Jan 2022 21:15) (95% - 100%)                            9.9    7.91  )-----------( 227      ( 21 Jan 2022 04:17 )             29.6     01-21    135  |  101  |  65<H>  ----------------------------<  210<H>  3.6   |  22  |  4.34<H>    Ca    7.6<L>      21 Jan 2022 04:17  Phos  4.5     01-21  Mg     2.0     01-21    TPro  5.7<L>  /  Alb  1.7<L>  /  TBili  0.4  /  DBili  x   /  AST  52<H>  /  ALT  33  /  AlkPhos  106  01-21        Culture - Urine (collected 19 Jan 2022 11:06)  Source: Catheterized Catheterized  Final Report (20 Jan 2022 19:02):    10,000 - 49,000 CFU/mL Yeast like cells "Susceptibilities not performed"      CAPILLARY BLOOD GLUCOSE      POCT Blood Glucose.: 165 mg/dL (21 Jan 2022 17:25)  POCT Blood Glucose.: 175 mg/dL (21 Jan 2022 11:08)  POCT Blood Glucose.: 247 mg/dL (21 Jan 2022 04:50)      MEDICATIONS  (STANDING):  albumin human 25% IVPB 50 milliLiter(s) IV Intermittent every 6 hours  apixaban 2.5 milliGRAM(s) Oral two times a day  aspirin  chewable 81 milliGRAM(s) Oral daily  atorvastatin 40 milliGRAM(s) Oral at bedtime  chlorhexidine 2% Cloths 1 Application(s) Topical daily  finasteride 5 milliGRAM(s) Oral daily  hydrocortisone sodium succinate Injectable 50 milliGRAM(s) IV Push every 12 hours  influenza  Vaccine (HIGH DOSE) 0.7 milliLiter(s) IntraMuscular once  insulin lispro (ADMELOG) corrective regimen sliding scale   SubCutaneous Before meals and at bedtime  midodrine 5 milliGRAM(s) Oral every 8 hours  mirtazapine 7.5 milliGRAM(s) Oral daily  mupirocin 2% Ointment 1 Application(s) Topical two times a day  norepinephrine Infusion 0.501 MICROgram(s)/kG/Min (28.5 mL/Hr) IV Continuous <Continuous>  pantoprazole    Tablet 40 milliGRAM(s) Oral before breakfast  piperacillin/tazobactam IVPB.. 3.375 Gram(s) IV Intermittent every 12 hours    MEDICATIONS  (PRN):    Allergies    No Known Allergies    Intolerances      PAST MEDICAL & SURGICAL HISTORY:  HTN (hypertension)    HLD (hyperlipidemia)    DM (diabetes mellitus)    CAD (coronary artery disease)    Glaucoma, angle-closure    [place] (hard of hearing)    S/P CABG (coronary artery bypass graft)    History of thyroid surgery      Social History:    multiple lesions mostly right foot   covered with dry dark skin   no drainage      HPI:  75 yo male from [facility] assisted living with medical history of DM on insulin, HTN, HLD, CAD S/p CABG, 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 Patient is alert and oriented on encounter. Patient states that he cut his foot and his back was hurting. Patient is unable to provide much history due to discomfort. Patient has stage 3 ulcers on sacrum, penile ulcer. Also has chronic wounds on foot. He was found to be hypotensive on field 60/30. He was given 3L bolus in ED after which BP was still low 70/30. Patient denies fever. As per AL records no h/o abdominal pain, nausea, vomiting, diarrhea, cough.  (17 Jan 2022 15:23)      ICU Vital Signs Last 24 Hrs  T(C): 35.8 (21 Jan 2022 17:30), Max: 36.1 (21 Jan 2022 04:00)  T(F): 96.5 (21 Jan 2022 17:30), Max: 97 (21 Jan 2022 04:00)  HR: 74 (21 Jan 2022 21:15) (60 - 86)  BP: 126/64 (21 Jan 2022 21:15) (53/38 - 145/80)  BP(mean): 79 (21 Jan 2022 21:15) (39 - 97)  ABP: --  ABP(mean): --  RR: 13 (21 Jan 2022 21:15) (10 - 20)  SpO2: 100% (21 Jan 2022 21:15) (95% - 100%)                            9.9    7.91  )-----------( 227      ( 21 Jan 2022 04:17 )             29.6     01-21    135  |  101  |  65<H>  ----------------------------<  210<H>  3.6   |  22  |  4.34<H>    Ca    7.6<L>      21 Jan 2022 04:17  Phos  4.5     01-21  Mg     2.0     01-21    TPro  5.7<L>  /  Alb  1.7<L>  /  TBili  0.4  /  DBili  x   /  AST  52<H>  /  ALT  33  /  AlkPhos  106  01-21        Culture - Urine (collected 19 Jan 2022 11:06)  Source: Catheterized Catheterized  Final Report (20 Jan 2022 19:02):    10,000 - 49,000 CFU/mL Yeast like cells "Susceptibilities not performed"      CAPILLARY BLOOD GLUCOSE      POCT Blood Glucose.: 165 mg/dL (21 Jan 2022 17:25)  POCT Blood Glucose.: 175 mg/dL (21 Jan 2022 11:08)  POCT Blood Glucose.: 247 mg/dL (21 Jan 2022 04:50)      MEDICATIONS  (STANDING):  albumin human 25% IVPB 50 milliLiter(s) IV Intermittent every 6 hours  apixaban 2.5 milliGRAM(s) Oral two times a day  aspirin  chewable 81 milliGRAM(s) Oral daily  atorvastatin 40 milliGRAM(s) Oral at bedtime  chlorhexidine 2% Cloths 1 Application(s) Topical daily  finasteride 5 milliGRAM(s) Oral daily  hydrocortisone sodium succinate Injectable 50 milliGRAM(s) IV Push every 12 hours  influenza  Vaccine (HIGH DOSE) 0.7 milliLiter(s) IntraMuscular once  insulin lispro (ADMELOG) corrective regimen sliding scale   SubCutaneous Before meals and at bedtime  midodrine 5 milliGRAM(s) Oral every 8 hours  mirtazapine 7.5 milliGRAM(s) Oral daily  mupirocin 2% Ointment 1 Application(s) Topical two times a day  norepinephrine Infusion 0.501 MICROgram(s)/kG/Min (28.5 mL/Hr) IV Continuous <Continuous>  pantoprazole    Tablet 40 milliGRAM(s) Oral before breakfast  piperacillin/tazobactam IVPB.. 3.375 Gram(s) IV Intermittent every 12 hours    MEDICATIONS  (PRN):    Allergies    No Known Allergies    Intolerances      PAST MEDICAL & SURGICAL HISTORY:  HTN (hypertension)    HLD (hyperlipidemia)    DM (diabetes mellitus)    CAD (coronary artery disease)    Glaucoma, angle-closure    [place] (hard of hearing)    S/P CABG (coronary artery bypass graft)    History of thyroid surgery      Social History:  pt seen and eval in ICU   exam :  vasc:   DP -fainted   TG-increased   CRF: 2 sec   neuro:   N/A  Derm:   multiple lesions mostly right foot   covered with dry dark skin   no drainage   b/l heel   DTI  NO PEDAL hair growth     skin is shiny   dry   discolorations ++  MS;  N/A  multiple bony prominences

## 2022-01-21 NOTE — PHARMACOTHERAPY INTERVENTION NOTE - COMMENTS
250 mg IVPB every 24 hours changed to 1000 mg IVPB every 24 hours for sepsis / crcl=11.3
ordered midodrine 50 mg q 8hrs to wean off vasopressin --recommended as the dose was high to 5 to 10 mg q 8h ---changed to 5 mg q 8 hrs

## 2022-01-21 NOTE — CONSULT NOTE ADULT - ASSESSMENT
offloading   cldyedwayne tessie ruff   will re-eval in a few days  a/p  DM with circulatory complications   multiple skin lesions   diabetic ulcers /DTI  pt seen and eval   offloading   xary when possible   antibiotics as per id   will re-eval in a few days   thank you for  this referral   please call with any pt related questions [phone]

## 2022-01-21 NOTE — PROGRESS NOTE ADULT - SUBJECTIVE AND OBJECTIVE BOX
INTERVAL HPI/OVERNIGHT EVENTS: ***    PRESSORS: [ ] YES [ ] NO  WHICH:    ANTIBIOTICS:                  DATE STARTED:  ANTIBIOTICS:                  DATE STARTED:  ANTIBIOTICS:                  DATE STARTED:    Antimicrobial:  linezolid  IVPB 600 milliGRAM(s) IV Intermittent every 12 hours  linezolid  IVPB      piperacillin/tazobactam IVPB.. 3.375 Gram(s) IV Intermittent every 12 hours    Cardiovascular:  norepinephrine Infusion 0.501 MICROgram(s)/kG/Min IV Continuous <Continuous>    Pulmonary:    Hematalogic:  apixaban 2.5 milliGRAM(s) Oral two times a day  aspirin  chewable 81 milliGRAM(s) Oral daily    Other:  atorvastatin 40 milliGRAM(s) Oral at bedtime  chlorhexidine 2% Cloths 1 Application(s) Topical daily  finasteride 5 milliGRAM(s) Oral daily  hydrocortisone sodium succinate Injectable 50 milliGRAM(s) IV Push every 12 hours  influenza  Vaccine (HIGH DOSE) 0.7 milliLiter(s) IntraMuscular once  insulin lispro (ADMELOG) corrective regimen sliding scale   SubCutaneous Before meals and at bedtime  mupirocin 2% Ointment 1 Application(s) Topical two times a day  pantoprazole    Tablet 40 milliGRAM(s) Oral before breakfast    apixaban 2.5 milliGRAM(s) Oral two times a day  aspirin  chewable 81 milliGRAM(s) Oral daily  atorvastatin 40 milliGRAM(s) Oral at bedtime  chlorhexidine 2% Cloths 1 Application(s) Topical daily  finasteride 5 milliGRAM(s) Oral daily  hydrocortisone sodium succinate Injectable 50 milliGRAM(s) IV Push every 12 hours  influenza  Vaccine (HIGH DOSE) 0.7 milliLiter(s) IntraMuscular once  insulin lispro (ADMELOG) corrective regimen sliding scale   SubCutaneous Before meals and at bedtime  linezolid  IVPB 600 milliGRAM(s) IV Intermittent every 12 hours  linezolid  IVPB      mupirocin 2% Ointment 1 Application(s) Topical two times a day  norepinephrine Infusion 0.501 MICROgram(s)/kG/Min IV Continuous <Continuous>  pantoprazole    Tablet 40 milliGRAM(s) Oral before breakfast  piperacillin/tazobactam IVPB.. 3.375 Gram(s) IV Intermittent every 12 hours    Drug Dosing Weight  Height (cm): 170.2 (17 Jan 2022 11:33)  Weight (kg): 60.7 (17 Jan 2022 16:10)  BMI (kg/m2): 21 (17 Jan 2022 16:10)  BSA (m2): 1.7 (17 Jan 2022 16:10)    CENTRAL LINE: [ ] YES [ ] NO  LOCATION:         [place]: [ ] YES [ ] NO          A-LINE:  [ ] YES [ ] NO  LOCATION:             ICU Vital Signs Last 24 Hrs  T(C): 36.1 (21 Jan 2022 04:00), Max: 36.6 (20 Jan 2022 19:41)  T(F): 97 (21 Jan 2022 04:00), Max: 97.9 (20 Jan 2022 19:41)  HR: 66 (21 Jan 2022 08:15) (60 - 82)  BP: 91/59 (21 Jan 2022 08:15) (81/55 - 145/80)  BP(mean): 68 (21 Jan 2022 08:15) (51 - 97)  ABP: --  ABP(mean): --  RR: 11 (21 Jan 2022 08:15) (8 - 19)  SpO2: 100% (21 Jan 2022 08:15) (100% - 100%)            01-20 @ 07:01  -  01-21 @ 07:00  --------------------------------------------------------  IN: 1331.4 mL / OUT: 415 mL / NET: 916.4 mL              PHYSICAL EXAM:    GENERAL: NAD  EYES: EOMI, PERRLA  NECK: Supple, No JVD; Normal thyroid; Trachea midline: No LAD   NERVOUS SYSTEM:  Alert & Oriented X3,  Motor Strength 5/5 B/L upper and lower extremities; DTRs 2+ intact and symmetric  CHEST/LUNG: No rales, rhonchi, wheezing, breath sounds present bilaterally  HEART: Regular rate and rhythm; No murmurs, no gallops  ABDOMEN: Soft, Nontender, Nondistended; Bowel sounds present, no pain or masses on palpation  : voiding well, Castañeda in place  EXTREMITIES:  2+ Peripheral Pulses, No clubbing, cyanosis, or edema  SKIN: warm, intact, no lesions         LABS:  CBC Full  -  ( 21 Jan 2022 04:17 )  WBC Count : 7.91 K/uL  RBC Count : 3.44 M/uL  Hemoglobin : 9.9 g/dL  Hematocrit : 29.6 %  Platelet Count - Automated : 227 K/uL  Mean Cell Volume : 86.0 fl  Mean Cell Hemoglobin : 28.8 pg  Mean Cell Hemoglobin Concentration : 33.4 gm/dL  Auto Neutrophil # : x  Auto Lymphocyte # : x  Auto Monocyte # : x  Auto Eosinophil # : x  Auto Basophil # : x  Auto Neutrophil % : x  Auto Lymphocyte % : x  Auto Monocyte % : x  Auto Eosinophil % : x  Auto Basophil % : x    01-21    135  |  101  |  65<H>  ----------------------------<  210<H>  3.6   |  22  |  4.34<H>    Ca    7.6<L>      21 Jan 2022 04:17  Phos  4.5     01-21  Mg     2.0     01-21    TPro  5.7<L>  /  Alb  1.7<L>  /  TBili  0.4  /  DBili  x   /  AST  52<H>  /  ALT  33  /  AlkPhos  106  01-21        Culture Results:   10,000 - 49,000 CFU/mL Yeast like cells "Susceptibilities not performed" (01-19 @ 11:06)      RADIOLOGY & ADDITIONAL STUDIES REVIEWED:  ***    [ ]GOALS OF CARE DISCUSSION WITH PATIENT/FAMILY/PROXY:    CRITICAL CARE TIME SPENT: 35 minutes   INTERVAL HPI/OVERNIGHT EVENTS: patient with less pressor requirements, poor appetite, denies any pain. No overnight events.    PRESSORS: [x ] YES [ ] NO  WHICH: levophed     Antimicrobial:  linezolid  IVPB 600 milliGRAM(s) IV Intermittent every 12 hours  linezolid  IVPB      piperacillin/tazobactam IVPB.. 3.375 Gram(s) IV Intermittent every 12 hours    Cardiovascular:  norepinephrine Infusion 0.501 MICROgram(s)/kG/Min IV Continuous <Continuous>    Pulmonary:    Hematalogic:  apixaban 2.5 milliGRAM(s) Oral two times a day  aspirin  chewable 81 milliGRAM(s) Oral daily    Other:  atorvastatin 40 milliGRAM(s) Oral at bedtime  chlorhexidine 2% Cloths 1 Application(s) Topical daily  finasteride 5 milliGRAM(s) Oral daily  hydrocortisone sodium succinate Injectable 50 milliGRAM(s) IV Push every 12 hours  influenza  Vaccine (HIGH DOSE) 0.7 milliLiter(s) IntraMuscular once  insulin lispro (ADMELOG) corrective regimen sliding scale   SubCutaneous Before meals and at bedtime  mupirocin 2% Ointment 1 Application(s) Topical two times a day  pantoprazole    Tablet 40 milliGRAM(s) Oral before breakfast    apixaban 2.5 milliGRAM(s) Oral two times a day  aspirin  chewable 81 milliGRAM(s) Oral daily  atorvastatin 40 milliGRAM(s) Oral at bedtime  chlorhexidine 2% Cloths 1 Application(s) Topical daily  finasteride 5 milliGRAM(s) Oral daily  hydrocortisone sodium succinate Injectable 50 milliGRAM(s) IV Push every 12 hours  influenza  Vaccine (HIGH DOSE) 0.7 milliLiter(s) IntraMuscular once  insulin lispro (ADMELOG) corrective regimen sliding scale   SubCutaneous Before meals and at bedtime  linezolid  IVPB 600 milliGRAM(s) IV Intermittent every 12 hours  linezolid  IVPB      mupirocin 2% Ointment 1 Application(s) Topical two times a day  norepinephrine Infusion 0.501 MICROgram(s)/kG/Min IV Continuous <Continuous>  pantoprazole    Tablet 40 milliGRAM(s) Oral before breakfast  piperacillin/tazobactam IVPB.. 3.375 Gram(s) IV Intermittent every 12 hours    Drug Dosing Weight  Height (cm): 170.2 (17 Jan 2022 11:33)  Weight (kg): 60.7 (17 Jan 2022 16:10)  BMI (kg/m2): 21 (17 Jan 2022 16:10)  BSA (m2): 1.7 (17 Jan 2022 16:10)    CENTRAL LINE: [ ] YES [ ] NO  LOCATION:         [place]: [ ] YES [ ] NO          A-LINE:  [ ] YES [ ] NO  LOCATION:             ICU Vital Signs Last 24 Hrs  T(C): 36.1 (21 Jan 2022 04:00), Max: 36.6 (20 Jan 2022 19:41)  T(F): 97 (21 Jan 2022 04:00), Max: 97.9 (20 Jan 2022 19:41)  HR: 66 (21 Jan 2022 08:15) (60 - 82)  BP: 91/59 (21 Jan 2022 08:15) (81/55 - 145/80)  BP(mean): 68 (21 Jan 2022 08:15) (51 - 97)  ABP: --  ABP(mean): --  RR: 11 (21 Jan 2022 08:15) (8 - 19)  SpO2: 100% (21 Jan 2022 08:15) (100% - 100%)            01-20 @ 07:01  -  01-21 @ 07:00  --------------------------------------------------------  IN: 1331.4 mL / OUT: 415 mL / NET: 916.4 mL              PHYSICAL EXAM:    GENERAL: NAD, RIJ, cachectic, temporal wasting  EYES: EOMI, PERRLA  NECK: Supple, No JVD; Normal thyroid; Trachea midline: No LAD   NERVOUS SYSTEM:  Alert & Oriented X3,  Motor Strength 5/5 B/L upper and lower extremities; DTRs 2+ intact and symmetric  CHEST/LUNG: No rales, rhonchi, wheezing, breath sounds present bilaterally  HEART: Regular rate and rhythm; No murmurs, no gallops  ABDOMEN: Soft, Nontender, Nondistended; Bowel sounds present, no pain or masses on palpation  : Castañeda in place, penile ulcer  EXTREMITIES:  2+ Peripheral Pulses, No clubbing, cyanosis, or edema  SKIN: warm, multiple skin ulcer on back, lower extremities and penile area         LABS:  CBC Full  -  ( 21 Jan 2022 04:17 )  WBC Count : 7.91 K/uL  RBC Count : 3.44 M/uL  Hemoglobin : 9.9 g/dL  Hematocrit : 29.6 %  Platelet Count - Automated : 227 K/uL  Mean Cell Volume : 86.0 fl  Mean Cell Hemoglobin : 28.8 pg  Mean Cell Hemoglobin Concentration : 33.4 gm/dL  Auto Neutrophil # : x  Auto Lymphocyte # : x  Auto Monocyte # : x  Auto Eosinophil # : x  Auto Basophil # : x  Auto Neutrophil % : x  Auto Lymphocyte % : x  Auto Monocyte % : x  Auto Eosinophil % : x  Auto Basophil % : x    01-21    135  |  101  |  65<H>  ----------------------------<  210<H>  3.6   |  22  |  4.34<H>    Ca    7.6<L>      21 Jan 2022 04:17  Phos  4.5     01-21  Mg     2.0     01-21    TPro  5.7<L>  /  Alb  1.7<L>  /  TBili  0.4  /  DBili  x   /  AST  52<H>  /  ALT  33  /  AlkPhos  106  01-21        Culture Results:   10,000 - 49,000 CFU/mL Yeast like cells "Susceptibilities not performed" (01-19 @ 11:06)      RADIOLOGY & ADDITIONAL STUDIES REVIEWED:  ***    [ ]GOALS OF CARE DISCUSSION WITH PATIENT/FAMILY/PROXY:    CRITICAL CARE TIME SPENT: 35 minutes

## 2022-01-22 LAB
ALBUMIN SERPL ELPH-MCNC: 2.2 G/DL — LOW (ref 3.5–5)
ALP SERPL-CCNC: 77 U/L — SIGNIFICANT CHANGE UP (ref 40–120)
ALT FLD-CCNC: 27 U/L DA — SIGNIFICANT CHANGE UP (ref 10–60)
ANION GAP SERPL CALC-SCNC: 12 MMOL/L — SIGNIFICANT CHANGE UP (ref 5–17)
AST SERPL-CCNC: 28 U/L — SIGNIFICANT CHANGE UP (ref 10–40)
BILIRUB SERPL-MCNC: 0.5 MG/DL — SIGNIFICANT CHANGE UP (ref 0.2–1.2)
BUN SERPL-MCNC: 66 MG/DL — HIGH (ref 7–18)
CALCIUM SERPL-MCNC: 7.8 MG/DL — LOW (ref 8.4–10.5)
CHLORIDE SERPL-SCNC: 105 MMOL/L — SIGNIFICANT CHANGE UP (ref 96–108)
CO2 SERPL-SCNC: 20 MMOL/L — LOW (ref 22–31)
CREAT SERPL-MCNC: 4.22 MG/DL — HIGH (ref 0.5–1.3)
CULTURE RESULTS: SIGNIFICANT CHANGE UP
CULTURE RESULTS: SIGNIFICANT CHANGE UP
GLUCOSE BLDC GLUCOMTR-MCNC: 126 MG/DL — HIGH (ref 70–99)
GLUCOSE BLDC GLUCOMTR-MCNC: 148 MG/DL — HIGH (ref 70–99)
GLUCOSE BLDC GLUCOMTR-MCNC: 181 MG/DL — HIGH (ref 70–99)
GLUCOSE BLDC GLUCOMTR-MCNC: 182 MG/DL — HIGH (ref 70–99)
GLUCOSE SERPL-MCNC: 183 MG/DL — HIGH (ref 70–99)
HCT VFR BLD CALC: 27.3 % — LOW (ref 39–50)
HGB BLD-MCNC: 9.2 G/DL — LOW (ref 13–17)
MAGNESIUM SERPL-MCNC: 2.1 MG/DL — SIGNIFICANT CHANGE UP (ref 1.6–2.6)
MCHC RBC-ENTMCNC: 28.8 PG — SIGNIFICANT CHANGE UP (ref 27–34)
MCHC RBC-ENTMCNC: 33.7 GM/DL — SIGNIFICANT CHANGE UP (ref 32–36)
MCV RBC AUTO: 85.6 FL — SIGNIFICANT CHANGE UP (ref 80–100)
NRBC # BLD: 0 /100 WBCS — SIGNIFICANT CHANGE UP (ref 0–0)
PHOSPHATE SERPL-MCNC: 4.3 MG/DL — SIGNIFICANT CHANGE UP (ref 2.5–4.5)
PLATELET # BLD AUTO: 160 K/UL — SIGNIFICANT CHANGE UP (ref 150–400)
POTASSIUM SERPL-MCNC: 3.4 MMOL/L — LOW (ref 3.5–5.3)
POTASSIUM SERPL-SCNC: 3.4 MMOL/L — LOW (ref 3.5–5.3)
PROT SERPL-MCNC: 5.7 G/DL — LOW (ref 6–8.3)
RBC # BLD: 3.19 M/UL — LOW (ref 4.2–5.8)
RBC # FLD: 16 % — HIGH (ref 10.3–14.5)
SODIUM SERPL-SCNC: 137 MMOL/L — SIGNIFICANT CHANGE UP (ref 135–145)
SPECIMEN SOURCE: SIGNIFICANT CHANGE UP
SPECIMEN SOURCE: SIGNIFICANT CHANGE UP
WBC # BLD: 6.2 K/UL — SIGNIFICANT CHANGE UP (ref 3.8–10.5)
WBC # FLD AUTO: 6.2 K/UL — SIGNIFICANT CHANGE UP (ref 3.8–10.5)

## 2022-01-22 RX ORDER — ACETAMINOPHEN 500 MG
650 TABLET ORAL EVERY 6 HOURS
Refills: 0 | Status: DISCONTINUED | OUTPATIENT
Start: 2022-01-22 | End: 2022-02-10

## 2022-01-22 RX ADMIN — MIRTAZAPINE 7.5 MILLIGRAM(S): 45 TABLET, ORALLY DISINTEGRATING ORAL at 11:55

## 2022-01-22 RX ADMIN — Medication 50 MILLILITER(S): at 05:03

## 2022-01-22 RX ADMIN — FLUCONAZOLE 100 MILLIGRAM(S): 150 TABLET ORAL at 11:55

## 2022-01-22 RX ADMIN — PIPERACILLIN AND TAZOBACTAM 25 GRAM(S): 4; .5 INJECTION, POWDER, LYOPHILIZED, FOR SOLUTION INTRAVENOUS at 05:05

## 2022-01-22 RX ADMIN — MIDODRINE HYDROCHLORIDE 5 MILLIGRAM(S): 2.5 TABLET ORAL at 21:24

## 2022-01-22 RX ADMIN — Medication 650 MILLIGRAM(S): at 21:23

## 2022-01-22 RX ADMIN — PANTOPRAZOLE SODIUM 40 MILLIGRAM(S): 20 TABLET, DELAYED RELEASE ORAL at 05:04

## 2022-01-22 RX ADMIN — APIXABAN 2.5 MILLIGRAM(S): 2.5 TABLET, FILM COATED ORAL at 17:14

## 2022-01-22 RX ADMIN — APIXABAN 2.5 MILLIGRAM(S): 2.5 TABLET, FILM COATED ORAL at 05:04

## 2022-01-22 RX ADMIN — PIPERACILLIN AND TAZOBACTAM 25 GRAM(S): 4; .5 INJECTION, POWDER, LYOPHILIZED, FOR SOLUTION INTRAVENOUS at 17:14

## 2022-01-22 RX ADMIN — MUPIROCIN 1 APPLICATION(S): 20 OINTMENT TOPICAL at 17:16

## 2022-01-22 RX ADMIN — MIDODRINE HYDROCHLORIDE 5 MILLIGRAM(S): 2.5 TABLET ORAL at 13:49

## 2022-01-22 RX ADMIN — Medication 650 MILLIGRAM(S): at 22:00

## 2022-01-22 RX ADMIN — MUPIROCIN 1 APPLICATION(S): 20 OINTMENT TOPICAL at 05:04

## 2022-01-22 RX ADMIN — Medication 2: at 12:09

## 2022-01-22 RX ADMIN — ATORVASTATIN CALCIUM 40 MILLIGRAM(S): 80 TABLET, FILM COATED ORAL at 21:24

## 2022-01-22 RX ADMIN — FINASTERIDE 5 MILLIGRAM(S): 5 TABLET, FILM COATED ORAL at 11:54

## 2022-01-22 RX ADMIN — Medication 2: at 05:19

## 2022-01-22 RX ADMIN — CHLORHEXIDINE GLUCONATE 1 APPLICATION(S): 213 SOLUTION TOPICAL at 11:55

## 2022-01-22 RX ADMIN — Medication 50 MILLIGRAM(S): at 17:14

## 2022-01-22 RX ADMIN — MIDODRINE HYDROCHLORIDE 5 MILLIGRAM(S): 2.5 TABLET ORAL at 05:04

## 2022-01-22 RX ADMIN — Medication 81 MILLIGRAM(S): at 11:54

## 2022-01-22 RX ADMIN — Medication 50 MILLIGRAM(S): at 05:03

## 2022-01-22 NOTE — PROGRESS NOTE ADULT - SUBJECTIVE AND OBJECTIVE BOX
NEPHROLOGY MEDICAL CARE, [facility] - Dr. Tariq Herrera/ Dr. Lauren Lopez/ Dr. Dre Call/ Dr. Destiny Knott    Patient was seen and examined at bedside.    CC: patient is confused and off pressors with MAP >60    Vital Signs Last 24 Hrs  T(C): 35.9 (22 Jan 2022 04:00), Max: 36.1 (21 Jan 2022 22:45)  T(F): 96.6 (22 Jan 2022 04:00), Max: 97 (21 Jan 2022 22:45)  HR: 59 (22 Jan 2022 08:30) (56 - 86)  BP: 102/45 (22 Jan 2022 08:30) (53/38 - 163/92)  BP(mean): 59 (22 Jan 2022 08:30) (39 - 121)  RR: 17 (22 Jan 2022 08:30) (9 - 21)  SpO2: 100% (22 Jan 2022 08:30) (95% - 100%)    01-21 @ 07:01  -  01-22 @ 07:00  --------------------------------------------------------  IN: 630.5 mL / OUT: 565 mL / NET: 65.5 mL        PHYSICAL EXAM:  General: No acute respiratory distress.  Eyes: conjunctiva and sclera clear  ENMT: Atraumatic, Normocephalic, supple, No JVD present. Moist mucous membranes  Respiratory: Bilateral poor air entry; No rales, rhonchi, wheezing  Cardiovassular: S1S2+; no m/r/g  Gastrointestinal: Soft, Non-tender, Nondistended; Bowel sounds present  : ibrahim's cath with brown colored urine  Neuro:  Awake but confused..   Ext: trace edema  Skin: No visible rashes    MEDICATIONS:  MEDICATIONS  (STANDING):  apixaban 2.5 milliGRAM(s) Oral two times a day  aspirin  chewable 81 milliGRAM(s) Oral daily  atorvastatin 40 milliGRAM(s) Oral at bedtime  chlorhexidine 2% Cloths 1 Application(s) Topical daily  finasteride 5 milliGRAM(s) Oral daily  fluconAZOLE   Tablet 100 milliGRAM(s) Oral daily  hydrocortisone sodium succinate Injectable 50 milliGRAM(s) IV Push every 12 hours  influenza  Vaccine (HIGH DOSE) 0.7 milliLiter(s) IntraMuscular once  insulin lispro (ADMELOG) corrective regimen sliding scale   SubCutaneous Before meals and at bedtime  midodrine 5 milliGRAM(s) Oral every 8 hours  mirtazapine 7.5 milliGRAM(s) Oral daily  mupirocin 2% Ointment 1 Application(s) Topical two times a day  norepinephrine Infusion 0.501 MICROgram(s)/kG/Min (28.5 mL/Hr) IV Continuous <Continuous>  pantoprazole    Tablet 40 milliGRAM(s) Oral before breakfast  piperacillin/tazobactam IVPB.. 3.375 Gram(s) IV Intermittent every 12 hours    MEDICATIONS  (PRN):          LABS:                        9.2    6.20  )-----------( 160      ( 22 Jan 2022 03:24 )             27.3     01-22    137  |  105  |  66<H>  ----------------------------<  183<H>  3.4<L>   |  20<L>  |  4.22<H>    Ca    7.8<L>      22 Jan 2022 03:24  Phos  4.3     01-22  Mg     2.1     01-22    TPro  5.7<L>  /  Alb  2.2<L>  /  TBili  0.5  /  DBili  x   /  AST  28  /  ALT  27  /  AlkPhos  77  01-22        Magnesium, Serum: 2.1 mg/dL (01-22 @ 03:24)  Phosphorus Level, Serum: 4.3 mg/dL (01-22 @ 03:24)    Urine studies    PTH and Vit D:

## 2022-01-22 NOTE — PROGRESS NOTE ADULT - ASSESSMENT
1. OREN due to ATN.  -scr improving today with stable electrolytes; UO is improving and no urgent to HD.   -urinalysis shows granular casts. urine lytes not done.   -Adjust meds to eGFR and avoid IV Gadolinium contrast,NSAIDs, and phosphate enema.  -Monitor I/O's daily.   -Monitor SMA daily.  2. CKD stage 3 most likely due to diabetic nephropathy  -Baseline Scr around 1.2mg/dL  -Keep patient euvolemic and renal diet  -Avoid Nephrotoxic Meds/ Agents such as (NSAIDs, IV contrast, Aminoglycosides such as gentamicin, -Gadolinium contrast, Phosphate containing enemas, etc..)  -Adjust Medications according to eGFR  3. Anemia: hb is stable. monitor CBC  4. Hypotension: maintain MAP >60 and off low dose pressor.  5. MBD: phos is okay    Patient is critically ill. Time Spent: 35mins.  Discussed the assessment and plan with ICU Team/Nurse

## 2022-01-22 NOTE — PROGRESS NOTE ADULT - SUBJECTIVE AND OBJECTIVE BOX
INTERVAL HPI/OVERNIGHT EVENTS: Titrating down levophed. No overnight events.    PRESSORS: [x] YES [ ] NO  WHICH: levophed    Antimicrobial:  fluconAZOLE   Tablet 100 milliGRAM(s) Oral daily  piperacillin/tazobactam IVPB.. 3.375 Gram(s) IV Intermittent every 12 hours    Cardiovascular:  midodrine 5 milliGRAM(s) Oral every 8 hours  norepinephrine Infusion 0.501 MICROgram(s)/kG/Min IV Continuous <Continuous>    Pulmonary:    Hematalogic:  apixaban 2.5 milliGRAM(s) Oral two times a day  aspirin  chewable 81 milliGRAM(s) Oral daily    Other:  albumin human 25% IVPB 50 milliLiter(s) IV Intermittent every 6 hours  atorvastatin 40 milliGRAM(s) Oral at bedtime  chlorhexidine 2% Cloths 1 Application(s) Topical daily  finasteride 5 milliGRAM(s) Oral daily  hydrocortisone sodium succinate Injectable 50 milliGRAM(s) IV Push every 12 hours  influenza  Vaccine (HIGH DOSE) 0.7 milliLiter(s) IntraMuscular once  insulin lispro (ADMELOG) corrective regimen sliding scale   SubCutaneous Before meals and at bedtime  mirtazapine 7.5 milliGRAM(s) Oral daily  mupirocin 2% Ointment 1 Application(s) Topical two times a day  pantoprazole    Tablet 40 milliGRAM(s) Oral before breakfast    albumin human 25% IVPB 50 milliLiter(s) IV Intermittent every 6 hours  apixaban 2.5 milliGRAM(s) Oral two times a day  aspirin  chewable 81 milliGRAM(s) Oral daily  atorvastatin 40 milliGRAM(s) Oral at bedtime  chlorhexidine 2% Cloths 1 Application(s) Topical daily  finasteride 5 milliGRAM(s) Oral daily  fluconAZOLE   Tablet 100 milliGRAM(s) Oral daily  hydrocortisone sodium succinate Injectable 50 milliGRAM(s) IV Push every 12 hours  influenza  Vaccine (HIGH DOSE) 0.7 milliLiter(s) IntraMuscular once  insulin lispro (ADMELOG) corrective regimen sliding scale   SubCutaneous Before meals and at bedtime  midodrine 5 milliGRAM(s) Oral every 8 hours  mirtazapine 7.5 milliGRAM(s) Oral daily  mupirocin 2% Ointment 1 Application(s) Topical two times a day  norepinephrine Infusion 0.501 MICROgram(s)/kG/Min IV Continuous <Continuous>  pantoprazole    Tablet 40 milliGRAM(s) Oral before breakfast  piperacillin/tazobactam IVPB.. 3.375 Gram(s) IV Intermittent every 12 hours    Drug Dosing Weight  Height (cm): 170.2 (17 Jan 2022 11:33)  Weight (kg): 60.7 (17 Jan 2022 16:10)  BMI (kg/m2): 21 (17 Jan 2022 16:10)  BSA (m2): 1.7 (17 Jan 2022 16:10)    CENTRAL LINE: [ ] YES [ ] NO  LOCATION:         [place]: [ ] YES [ ] NO          A-LINE:  [ ] YES [ ] NO  LOCATION:         ICU Vital Signs Last 24 Hrs  T(C): 35.6 (22 Jan 2022 00:00), Max: 36.1 (21 Jan 2022 04:00)  T(F): 96 (22 Jan 2022 00:00), Max: 97 (21 Jan 2022 04:00)  HR: 69 (22 Jan 2022 03:00) (60 - 86)  BP: 145/71 (22 Jan 2022 03:00) (53/38 - 163/92)  BP(mean): 89 (22 Jan 2022 03:00) (39 - 121)  ABP: --  ABP(mean): --  RR: 10 (22 Jan 2022 03:00) (9 - 21)  SpO2: 100% (22 Jan 2022 03:00) (95% - 100%)            01-20 @ 07:01  -  01-21 @ 07:00  --------------------------------------------------------  IN: 1331.4 mL / OUT: 415 mL / NET: 916.4 mL              PHYSICAL EXAM:  GENERAL: NAD, RIJ, cachectic, temporal wasting  EYES: EOMI, PERRLA  NECK: Supple, No JVD; Normal thyroid; Trachea midline: No LAD   NERVOUS SYSTEM:  Alert & Oriented X3,  Motor Strength 5/5 B/L upper and lower extremities; DTRs 2+ intact and symmetric  CHEST/LUNG: No rales, rhonchi, wheezing, breath sounds present bilaterally  HEART: Regular rate and rhythm; No murmurs, no gallops  ABDOMEN: Soft, Nontender, Nondistended; Bowel sounds present, no pain or masses on palpation  : Castañeda in place, penile ulcer  EXTREMITIES:  2+ Peripheral Pulses, No clubbing, cyanosis, or edema  SKIN: warm, multiple skin ulcer on back, lower extremities and penile area       LABS:  CBC Full  -  ( 22 Jan 2022 03:24 )  WBC Count : 6.20 K/uL  RBC Count : 3.19 M/uL  Hemoglobin : 9.2 g/dL  Hematocrit : 27.3 %  Platelet Count - Automated : 160 K/uL  Mean Cell Volume : 85.6 fl  Mean Cell Hemoglobin : 28.8 pg  Mean Cell Hemoglobin Concentration : 33.7 gm/dL  Auto Neutrophil # : x  Auto Lymphocyte # : x  Auto Monocyte # : x  Auto Eosinophil # : x  Auto Basophil # : x  Auto Neutrophil % : x  Auto Lymphocyte % : x  Auto Monocyte % : x  Auto Eosinophil % : x  Auto Basophil % : x    01-21    135  |  101  |  65<H>  ----------------------------<  210<H>  3.6   |  22  |  4.34<H>    Ca    7.6<L>      21 Jan 2022 04:17  Phos  4.5     01-21  Mg     2.0     01-21    TPro  5.7<L>  /  Alb  1.7<L>  /  TBili  0.4  /  DBili  x   /  AST  52<H>  /  ALT  33  /  AlkPhos  106  01-21        Culture Results:   10,000 - 49,000 CFU/mL Yeast like cells "Susceptibilities not performed" (01-19 @ 11:06)      RADIOLOGY & ADDITIONAL STUDIES REVIEWED:  ***    [ ]GOALS OF CARE DISCUSSION WITH PATIENT/FAMILY/PROXY:    CRITICAL CARE TIME SPENT: 35 minutes INTERVAL HPI/OVERNIGHT EVENTS: Titrating down levophed. No overnight events.    PRESSORS: [x] YES [ ] NO  WHICH: levophed    Antimicrobial:  fluconAZOLE   Tablet 100 milliGRAM(s) Oral daily  piperacillin/tazobactam IVPB.. 3.375 Gram(s) IV Intermittent every 12 hours    Cardiovascular:  midodrine 5 milliGRAM(s) Oral every 8 hours  norepinephrine Infusion 0.501 MICROgram(s)/kG/Min IV Continuous <Continuous>    Pulmonary:    Hematalogic:  apixaban 2.5 milliGRAM(s) Oral two times a day  aspirin  chewable 81 milliGRAM(s) Oral daily    Other:  albumin human 25% IVPB 50 milliLiter(s) IV Intermittent every 6 hours  atorvastatin 40 milliGRAM(s) Oral at bedtime  chlorhexidine 2% Cloths 1 Application(s) Topical daily  finasteride 5 milliGRAM(s) Oral daily  hydrocortisone sodium succinate Injectable 50 milliGRAM(s) IV Push every 12 hours  influenza  Vaccine (HIGH DOSE) 0.7 milliLiter(s) IntraMuscular once  insulin lispro (ADMELOG) corrective regimen sliding scale   SubCutaneous Before meals and at bedtime  mirtazapine 7.5 milliGRAM(s) Oral daily  mupirocin 2% Ointment 1 Application(s) Topical two times a day  pantoprazole    Tablet 40 milliGRAM(s) Oral before breakfast    albumin human 25% IVPB 50 milliLiter(s) IV Intermittent every 6 hours  apixaban 2.5 milliGRAM(s) Oral two times a day  aspirin  chewable 81 milliGRAM(s) Oral daily  atorvastatin 40 milliGRAM(s) Oral at bedtime  chlorhexidine 2% Cloths 1 Application(s) Topical daily  finasteride 5 milliGRAM(s) Oral daily  fluconAZOLE   Tablet 100 milliGRAM(s) Oral daily  hydrocortisone sodium succinate Injectable 50 milliGRAM(s) IV Push every 12 hours  influenza  Vaccine (HIGH DOSE) 0.7 milliLiter(s) IntraMuscular once  insulin lispro (ADMELOG) corrective regimen sliding scale   SubCutaneous Before meals and at bedtime  midodrine 5 milliGRAM(s) Oral every 8 hours  mirtazapine 7.5 milliGRAM(s) Oral daily  mupirocin 2% Ointment 1 Application(s) Topical two times a day  norepinephrine Infusion 0.501 MICROgram(s)/kG/Min IV Continuous <Continuous>  pantoprazole    Tablet 40 milliGRAM(s) Oral before breakfast  piperacillin/tazobactam IVPB.. 3.375 Gram(s) IV Intermittent every 12 hours    Drug Dosing Weight  Height (cm): 170.2 (17 Jan 2022 11:33)  Weight (kg): 60.7 (17 Jan 2022 16:10)  BMI (kg/m2): 21 (17 Jan 2022 16:10)  BSA (m2): 1.7 (17 Jan 2022 16:10)    CENTRAL LINE: [ ] YES [ ] NO  LOCATION:         [place]: [ ] YES [ ] NO          A-LINE:  [ ] YES [ ] NO  LOCATION:         ICU Vital Signs Last 24 Hrs  T(C): 35.6 (22 Jan 2022 00:00), Max: 36.1 (21 Jan 2022 04:00)  T(F): 96 (22 Jan 2022 00:00), Max: 97 (21 Jan 2022 04:00)  HR: 69 (22 Jan 2022 03:00) (60 - 86)  BP: 145/71 (22 Jan 2022 03:00) (53/38 - 163/92)  BP(mean): 89 (22 Jan 2022 03:00) (39 - 121)  ABP: --  ABP(mean): --  RR: 10 (22 Jan 2022 03:00) (9 - 21)  SpO2: 100% (22 Jan 2022 03:00) (95% - 100%)            01-20 @ 07:01  -  01-21 @ 07:00  --------------------------------------------------------  IN: 1331.4 mL / OUT: 415 mL / NET: 916.4 mL              PHYSICAL EXAM:  GENERAL: NAD, RIJ, cachectic, temporal wasting  EYES: EOMI, PERRLA  NECK: Supple, No JVD; Normal thyroid; Trachea midline: No LAD   NERVOUS SYSTEM:  Alert & Oriented X3,  Motor Strength 5/5 B/L upper and lower extremities; DTRs 2+ intact and symmetric  CHEST/LUNG: No rales, rhonchi, wheezing, breath sounds present bilaterally  HEART: Regular rate and rhythm; No murmurs, no gallops  ABDOMEN: Soft, Nontender, Nondistended; Bowel sounds present, no pain or masses on palpation  : Castañeda in place, penile ulcer  EXTREMITIES:  2+ Peripheral Pulses, No clubbing, cyanosis, or edema  SKIN: warm, multiple skin ulcer on back, lower extremities and penile area       LABS:  CBC Full  -  ( 22 Jan 2022 03:24 )  WBC Count : 6.20 K/uL  RBC Count : 3.19 M/uL  Hemoglobin : 9.2 g/dL  Hematocrit : 27.3 %  Platelet Count - Automated : 160 K/uL  Mean Cell Volume : 85.6 fl  Mean Cell Hemoglobin : 28.8 pg  Mean Cell Hemoglobin Concentration : 33.7 gm/dL  Auto Neutrophil # : x  Auto Lymphocyte # : x  Auto Monocyte # : x  Auto Eosinophil # : x  Auto Basophil # : x  Auto Neutrophil % : x  Auto Lymphocyte % : x  Auto Monocyte % : x  Auto Eosinophil % : x  Auto Basophil % : x    01-21    135  |  101  |  65<H>  ----------------------------<  210<H>  3.6   |  22  |  4.34<H>    Ca    7.6<L>      21 Jan 2022 04:17  Phos  4.5     01-21  Mg     2.0     01-21    TPro  5.7<L>  /  Alb  1.7<L>  /  TBili  0.4  /  DBili  x   /  AST  52<H>  /  ALT  33  /  AlkPhos  106  01-21        Culture Results:   10,000 - 49,000 CFU/mL Yeast like cells "Susceptibilities not performed" (01-19 @ 11:06)      RADIOLOGY & ADDITIONAL STUDIES REVIEWED:      [ ]GOALS OF CARE DISCUSSION WITH PATIENT/FAMILY/PROXY:    CRITICAL CARE TIME SPENT: 35 minutes

## 2022-01-22 NOTE — PROGRESS NOTE ADULT - ASSESSMENT
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 Patient received 3L bolus in ED, s/p BP was still low 70/30s. Patient admitted to ICU for septic shock requiring pressors.     #Septic shock 2/2 ulcers vs UTI  #Delirium  #Acute renal failure  #Penile ulcer  #Sacral ulcer  #Diabetic foot ulcer  #Elevated INR  #Elevated LFTs    -NEURO  Patient AAOx3  Lethargy was likely delirium due to infection, now resolving   CTH showed  Moderate periventricular and deep white matter ischemia. Encephalomalacia and gliosis in RIGHT occipital lobe. Global atrophy. 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    -PULMONARY  No active issues at present    -CVS  #Septic shock likely 2/2 ulcers vs UTI  Patient presented with hypotension  Was given 3 L bolus in ED, BP was still low  S/p 6L of IVF total   C/w Levophed, off Vasopressin -- started midodrine 5 mg q8hrs   C/w stress dose of Solu-cortef 50 mg q12 hrs, deescalating   C/w Zosyn, D/c Linezolid today 1/21/22  Started on Diflucan 100 mg qd for 5 days   No more fluids for now, Will monitor UO  Started IV albumin x 4 doses   UA +ve and Ucx yeast like   ID Dr Randhawa on board     -GI  Has elevated INR and AST  Likely 2/2 septic shock  Will monitor CMP  Diet Puree renal diet    -RENAL  #Acute renal failure- around 4.2  Patient has normal creatinine at baseline, 1.18 Nov 2021  Presented with elevated BUN/cr of 51/4.45  Most likely ATN from hypoperfusion   S/p 6L bolus, No more fluids for now  Will monitor UO and BMP  Nephro on board     -ID  Septic shock 2/2 ulcers vs UTI  Patient has septic shock 2/2 SSTI  C/w Zosyn, D/c Linezolid today 1/21/22  Started on Diflucan 100 mg qd for 5 days   UA +ve and Ucx yeast like   ID Dr Randhawa on board    -ENDO  DM  Patient has h/o IDDM, on 10 units of Lantus at bedtime  Diet Puree renal diet   Monitor ACHS, FS before meals and at bedtime   HSS  Started Remeron for appetite     -SKIN  Patient has stage 3/4 sacral ulcer, penile ulcer, foot ulcer  Wound care on board  Dr Garcia consulted  C/w Linezolid and Zosyn  ID Dr Randhawa on board   on board    -Prophylactic measure  C/w Eliquis dose adjusted to 2.5 BID  C/w ppi    -GOC   Full Code  77 yo male from [facility] assisted living with medical history of DM on insulin, HTN, HLD, CAD S/p CABG, 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 Patient received 3L bolus in ED, s/p BP was still low 70/30s. Patient admitted to ICU for septic shock requiring pressors.     #Septic shock 2/2 ulcers vs UTI  #Delirium  #Acute renal failure  #Penile ulcer  #Sacral ulcer  #Diabetic foot ulcer  #Elevated INR  #Elevated LFTs    -NEURO  Patient AAOx3  Lethargy was likely delirium due to infection  CTH showed  Moderate periventricular and deep white matter ischemia. Encephalomalacia and gliosis in RIGHT occipital lobe. Global atrophy. 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    -PULMONARY  No active issues at present    -CVS  #Septic shock likely 2/2 ulcers vs UTI  Patient presented with hypotension  Was given 3 L bolus in ED, BP was still low  S/p 6L of IVF total   C/w Levophed, off Vasopressin -- started midodrine 5 mg q8hrs   C/w stress dose of Solu-cortef 50 mg q12 hrs, deescalating   C/w Zosyn, D/c Linezolid today 1/21/22  Started on Diflucan 100 mg qd for 5 days   No more fluids for now, Will monitor UO  Started IV albumin x 4 doses   d/c levo  UA +ve and Ucx yeast like   ID Dr Randhawa on board     -GI  Has elevated INR and AST  Likely 2/2 septic shock  Will monitor CMP  Diet Puree renal diet    -RENAL  #Acute renal failure- around 4.2  Patient has normal creatinine at baseline, 1.18 Nov 2021  Presented with elevated BUN/cr of 51/4.45  Most likely ATN from hypoperfusion   S/p 6L bolus, No more fluids for now  Will monitor UO and BMP  Nephro on board     -ID  Septic shock 2/2 ulcers vs UTI  Patient has septic shock 2/2 SSTI  C/w Zosyn, D/c Linezolid today 1/21/22  Started on Diflucan 100 mg qd for 5 days   UA +ve and Ucx yeast like   ID Dr Randhawa on board    -ENDO  DM  Patient has h/o IDDM, on 10 units of Lantus at bedtime  Diet Puree renal diet   Monitor ACHS, FS before meals and at bedtime   HSS  Started Remeron for appetite     -SKIN  Patient has stage 3/4 sacral ulcer, penile ulcer, foot ulcer  Wound care on board  Dr Garcia consulted  C/w Linezolid and Zosyn  ID Dr Randhawa on board   on board    -Prophylactic measure  C/w Eliquis dose adjusted to 2.5 BID  C/w ppi    -GOC   Full Code

## 2022-01-23 LAB
ALBUMIN SERPL ELPH-MCNC: 2.1 G/DL — LOW (ref 3.5–5)
ALP SERPL-CCNC: 70 U/L — SIGNIFICANT CHANGE UP (ref 40–120)
ALT FLD-CCNC: 24 U/L DA — SIGNIFICANT CHANGE UP (ref 10–60)
ANION GAP SERPL CALC-SCNC: 11 MMOL/L — SIGNIFICANT CHANGE UP (ref 5–17)
ANION GAP SERPL CALC-SCNC: 11 MMOL/L — SIGNIFICANT CHANGE UP (ref 5–17)
AST SERPL-CCNC: 23 U/L — SIGNIFICANT CHANGE UP (ref 10–40)
BILIRUB SERPL-MCNC: 0.6 MG/DL — SIGNIFICANT CHANGE UP (ref 0.2–1.2)
BUN SERPL-MCNC: 70 MG/DL — HIGH (ref 7–18)
BUN SERPL-MCNC: 75 MG/DL — HIGH (ref 7–18)
CALCIUM SERPL-MCNC: 7.9 MG/DL — LOW (ref 8.4–10.5)
CALCIUM SERPL-MCNC: 8 MG/DL — LOW (ref 8.4–10.5)
CHLORIDE SERPL-SCNC: 106 MMOL/L — SIGNIFICANT CHANGE UP (ref 96–108)
CHLORIDE SERPL-SCNC: 109 MMOL/L — HIGH (ref 96–108)
CO2 SERPL-SCNC: 19 MMOL/L — LOW (ref 22–31)
CO2 SERPL-SCNC: 20 MMOL/L — LOW (ref 22–31)
CREAT SERPL-MCNC: 4.16 MG/DL — HIGH (ref 0.5–1.3)
CREAT SERPL-MCNC: 4.29 MG/DL — HIGH (ref 0.5–1.3)
GLUCOSE BLDC GLUCOMTR-MCNC: 127 MG/DL — HIGH (ref 70–99)
GLUCOSE BLDC GLUCOMTR-MCNC: 136 MG/DL — HIGH (ref 70–99)
GLUCOSE BLDC GLUCOMTR-MCNC: 150 MG/DL — HIGH (ref 70–99)
GLUCOSE BLDC GLUCOMTR-MCNC: 157 MG/DL — HIGH (ref 70–99)
GLUCOSE SERPL-MCNC: 118 MG/DL — HIGH (ref 70–99)
GLUCOSE SERPL-MCNC: 142 MG/DL — HIGH (ref 70–99)
HCT VFR BLD CALC: 29.1 % — LOW (ref 39–50)
HGB BLD-MCNC: 9.6 G/DL — LOW (ref 13–17)
MAGNESIUM SERPL-MCNC: 2.1 MG/DL — SIGNIFICANT CHANGE UP (ref 1.6–2.6)
MCHC RBC-ENTMCNC: 28.6 PG — SIGNIFICANT CHANGE UP (ref 27–34)
MCHC RBC-ENTMCNC: 33 GM/DL — SIGNIFICANT CHANGE UP (ref 32–36)
MCV RBC AUTO: 86.6 FL — SIGNIFICANT CHANGE UP (ref 80–100)
NRBC # BLD: 0 /100 WBCS — SIGNIFICANT CHANGE UP (ref 0–0)
PHOSPHATE SERPL-MCNC: 4.1 MG/DL — SIGNIFICANT CHANGE UP (ref 2.5–4.5)
PLATELET # BLD AUTO: 154 K/UL — SIGNIFICANT CHANGE UP (ref 150–400)
POTASSIUM SERPL-MCNC: 3.1 MMOL/L — LOW (ref 3.5–5.3)
POTASSIUM SERPL-MCNC: 3.8 MMOL/L — SIGNIFICANT CHANGE UP (ref 3.5–5.3)
POTASSIUM SERPL-SCNC: 3.1 MMOL/L — LOW (ref 3.5–5.3)
POTASSIUM SERPL-SCNC: 3.8 MMOL/L — SIGNIFICANT CHANGE UP (ref 3.5–5.3)
PROT SERPL-MCNC: 5.5 G/DL — LOW (ref 6–8.3)
RBC # BLD: 3.36 M/UL — LOW (ref 4.2–5.8)
RBC # FLD: 16.3 % — HIGH (ref 10.3–14.5)
SODIUM SERPL-SCNC: 137 MMOL/L — SIGNIFICANT CHANGE UP (ref 135–145)
SODIUM SERPL-SCNC: 139 MMOL/L — SIGNIFICANT CHANGE UP (ref 135–145)
WBC # BLD: 8.64 K/UL — SIGNIFICANT CHANGE UP (ref 3.8–10.5)
WBC # FLD AUTO: 8.64 K/UL — SIGNIFICANT CHANGE UP (ref 3.8–10.5)

## 2022-01-23 RX ORDER — POTASSIUM CHLORIDE 20 MEQ
40 PACKET (EA) ORAL ONCE
Refills: 0 | Status: DISCONTINUED | OUTPATIENT
Start: 2022-01-23 | End: 2022-01-23

## 2022-01-23 RX ORDER — POTASSIUM CHLORIDE 20 MEQ
40 PACKET (EA) ORAL EVERY 4 HOURS
Refills: 0 | Status: COMPLETED | OUTPATIENT
Start: 2022-01-23 | End: 2022-01-23

## 2022-01-23 RX ADMIN — Medication 81 MILLIGRAM(S): at 11:19

## 2022-01-23 RX ADMIN — FLUCONAZOLE 100 MILLIGRAM(S): 150 TABLET ORAL at 11:19

## 2022-01-23 RX ADMIN — FINASTERIDE 5 MILLIGRAM(S): 5 TABLET, FILM COATED ORAL at 11:19

## 2022-01-23 RX ADMIN — Medication 50 MILLIGRAM(S): at 17:03

## 2022-01-23 RX ADMIN — Medication 2: at 22:19

## 2022-01-23 RX ADMIN — APIXABAN 2.5 MILLIGRAM(S): 2.5 TABLET, FILM COATED ORAL at 17:03

## 2022-01-23 RX ADMIN — PANTOPRAZOLE SODIUM 40 MILLIGRAM(S): 20 TABLET, DELAYED RELEASE ORAL at 06:05

## 2022-01-23 RX ADMIN — Medication 40 MILLIEQUIVALENT(S): at 11:52

## 2022-01-23 RX ADMIN — APIXABAN 2.5 MILLIGRAM(S): 2.5 TABLET, FILM COATED ORAL at 06:05

## 2022-01-23 RX ADMIN — PIPERACILLIN AND TAZOBACTAM 25 GRAM(S): 4; .5 INJECTION, POWDER, LYOPHILIZED, FOR SOLUTION INTRAVENOUS at 06:06

## 2022-01-23 RX ADMIN — MIRTAZAPINE 7.5 MILLIGRAM(S): 45 TABLET, ORALLY DISINTEGRATING ORAL at 11:19

## 2022-01-23 RX ADMIN — Medication 40 MILLIEQUIVALENT(S): at 17:03

## 2022-01-23 RX ADMIN — MUPIROCIN 1 APPLICATION(S): 20 OINTMENT TOPICAL at 17:03

## 2022-01-23 RX ADMIN — Medication 50 MILLIGRAM(S): at 06:05

## 2022-01-23 RX ADMIN — MIDODRINE HYDROCHLORIDE 5 MILLIGRAM(S): 2.5 TABLET ORAL at 06:05

## 2022-01-23 RX ADMIN — MUPIROCIN 1 APPLICATION(S): 20 OINTMENT TOPICAL at 06:45

## 2022-01-23 RX ADMIN — Medication 40 MILLIEQUIVALENT(S): at 08:46

## 2022-01-23 RX ADMIN — MIDODRINE HYDROCHLORIDE 5 MILLIGRAM(S): 2.5 TABLET ORAL at 13:56

## 2022-01-23 RX ADMIN — CHLORHEXIDINE GLUCONATE 1 APPLICATION(S): 213 SOLUTION TOPICAL at 11:20

## 2022-01-23 NOTE — PROGRESS NOTE ADULT - SUBJECTIVE AND OBJECTIVE BOX
NEPHROLOGY MEDICAL CARE, [facility] - Dr. Tariq Herrera/ Dr. Lauren Lopez/ Dr. Dre Call/ Dr. Destiny Knott    Patient was seen and examined at bedside.    CC: pt is okay; bp is improved.    Vital Signs Last 24 Hrs  T(C): 35.7 (23 Jan 2022 12:00), Max: 36.8 (22 Jan 2022 19:47)  T(F): 96.2 (23 Jan 2022 12:00), Max: 98.3 (22 Jan 2022 19:47)  HR: 78 (23 Jan 2022 12:00) (69 - 87)  BP: 109/58 (23 Jan 2022 12:00) (84/49 - 124/62)  BP(mean): 71 (23 Jan 2022 12:00) (56 - 78)  RR: 17 (23 Jan 2022 12:00) (10 - 22)  SpO2: 98% (23 Jan 2022 12:00) (96% - 100%)    01-22 @ 07:01  -  01-23 @ 07:00  --------------------------------------------------------  IN: 175 mL / OUT: 1320 mL / NET: -1145 mL        PHYSICAL EXAM:  General: No acute respiratory distress.  Eyes: conjunctiva and sclera clear  ENMT: Atraumatic, Normocephalic, supple, No JVD present. Moist mucous membranes  Respiratory: Bilateral poor air entry; No rales, rhonchi, wheezing  Cardiovassular: S1S2+; no m/r/g  Gastrointestinal: Soft, Non-tender, Nondistended; Bowel sounds present  : ibrahim's cath with brown colored urine  Neuro:  Awake but confused..   Ext: trace edema  Skin: No visible rashes    MEDICATIONS:  MEDICATIONS  (STANDING):  apixaban 2.5 milliGRAM(s) Oral two times a day  aspirin  chewable 81 milliGRAM(s) Oral daily  atorvastatin 40 milliGRAM(s) Oral at bedtime  chlorhexidine 2% Cloths 1 Application(s) Topical daily  finasteride 5 milliGRAM(s) Oral daily  fluconAZOLE   Tablet 100 milliGRAM(s) Oral daily  hydrocortisone sodium succinate Injectable 50 milliGRAM(s) IV Push every 12 hours  influenza  Vaccine (HIGH DOSE) 0.7 milliLiter(s) IntraMuscular once  insulin lispro (ADMELOG) corrective regimen sliding scale   SubCutaneous Before meals and at bedtime  midodrine 5 milliGRAM(s) Oral every 8 hours  mirtazapine 7.5 milliGRAM(s) Oral daily  mupirocin 2% Ointment 1 Application(s) Topical two times a day  norepinephrine Infusion 0.501 MICROgram(s)/kG/Min (28.5 mL/Hr) IV Continuous <Continuous>  pantoprazole    Tablet 40 milliGRAM(s) Oral before breakfast  potassium chloride    Tablet ER 40 milliEquivalent(s) Oral every 4 hours    MEDICATIONS  (PRN):  acetaminophen     Tablet .. 650 milliGRAM(s) Oral every 6 hours PRN Temp greater or equal to 38C (100.4F), Mild Pain (1 - 3)          LABS:                        9.6    8.64  )-----------( 154      ( 23 Jan 2022 05:29 )             29.1     01-23    137  |  106  |  70<H>  ----------------------------<  118<H>  3.1<L>   |  20<L>  |  4.29<H>    Ca    8.0<L>      23 Jan 2022 05:29  Phos  4.1     01-23  Mg     2.1     01-23    TPro  5.5<L>  /  Alb  2.1<L>  /  TBili  0.6  /  DBili  x   /  AST  23  /  ALT  24  /  AlkPhos  70  01-23        Magnesium, Serum: 2.1 mg/dL (01-23 @ 05:29)  Phosphorus Level, Serum: 4.1 mg/dL (01-23 @ 05:29)    Urine studies    PTH and Vit D:

## 2022-01-23 NOTE — PROGRESS NOTE ADULT - SUBJECTIVE AND OBJECTIVE BOX
76y Male    Meds:  fluconAZOLE   Tablet 100 milliGRAM(s) Oral daily    Allergies    No Known Allergies    Intolerances        VITALS:  Vital Signs Last 24 Hrs  T(C): 36.2 (23 Jan 2022 16:21), Max: 36.8 (22 Jan 2022 19:47)  T(F): 97.2 (23 Jan 2022 16:21), Max: 98.3 (22 Jan 2022 19:47)  HR: 80 (23 Jan 2022 19:00) (75 - 88)  BP: 138/63 (23 Jan 2022 19:00) (95/52 - 138/63)  BP(mean): 83 (23 Jan 2022 19:00) (60 - 83)  RR: 12 (23 Jan 2022 19:00) (10 - 22)  SpO2: 99% (23 Jan 2022 19:00) (96% - 100%)    LABS/DIAGNOSTIC TESTS:                          9.6    8.64  )-----------( 154      ( 23 Jan 2022 05:29 )             29.1         01-23    139  |  109<H>  |  75<H>  ----------------------------<  142<H>  3.8   |  19<L>  |  4.16<H>    Ca    7.9<L>      23 Jan 2022 16:47  Phos  4.1     01-23  Mg     2.1     01-23    TPro  5.5<L>  /  Alb  2.1<L>  /  TBili  0.6  /  DBili  x   /  AST  23  /  ALT  24  /  AlkPhos  70  01-23      LIVER FUNCTIONS - ( 23 Jan 2022 05:29 )  Alb: 2.1 g/dL / Pro: 5.5 g/dL / ALK PHOS: 70 U/L / ALT: 24 U/L DA / AST: 23 U/L / GGT: x             CULTURES: Catheterized Catheterized  01-19 @ 11:06   10,000 - 49,000 CFU/mL Yeast like cells "Susceptibilities not performed"  --  --      .Blood Blood-Peripheral  01-17 @ 17:49   No Growth Final  --  --      .Surgical Swab left foot wound  11-02 @ 13:15   Few Staphylococcus epidermidis  "Susceptibilities not performed"  --  --            RADIOLOGY:      ROS:  [  ] UNABLE TO ELICIT ICU VISIT  76y Male who remains in the ICU but is doing dramatically better overall, he looks well and has come off all pressors, he is answering questions appropriately and denies having any cough , SOB, chest pain , diarrhea, leg pains or other complaints. He still has a ibrahim in place. He is off all antibiotics and is on diflucan only. He has no fevers or chills either and his WBC count is WNL.    Meds:  fluconAZOLE   Tablet 100 milliGRAM(s) Oral daily    Allergies    No Known Allergies    Intolerances        VITALS:  Vital Signs Last 24 Hrs  T(C): 36.2 (23 Jan 2022 16:21), Max: 36.8 (22 Jan 2022 19:47)  T(F): 97.2 (23 Jan 2022 16:21), Max: 98.3 (22 Jan 2022 19:47)  HR: 80 (23 Jan 2022 19:00) (75 - 88)  BP: 138/63 (23 Jan 2022 19:00) (95/52 - 138/63)  BP(mean): 83 (23 Jan 2022 19:00) (60 - 83)  RR: 12 (23 Jan 2022 19:00) (10 - 22)  SpO2: 99% (23 Jan 2022 19:00) (96% - 100%)    LABS/DIAGNOSTIC TESTS:                          9.6    8.64  )-----------( 154      ( 23 Jan 2022 05:29 )             29.1         01-23    139  |  109<H>  |  75<H>  ----------------------------<  142<H>  3.8   |  19<L>  |  4.16<H>    Ca    7.9<L>      23 Jan 2022 16:47  Phos  4.1     01-23  Mg     2.1     01-23    TPro  5.5<L>  /  Alb  2.1<L>  /  TBili  0.6  /  DBili  x   /  AST  23  /  ALT  24  /  AlkPhos  70  01-23      LIVER FUNCTIONS - ( 23 Jan 2022 05:29 )  Alb: 2.1 g/dL / Pro: 5.5 g/dL / ALK PHOS: 70 U/L / ALT: 24 U/L DA / AST: 23 U/L / GGT: x             CULTURES: Catheterized Catheterized  01-19 @ 11:06   10,000 - 49,000 CFU/mL Yeast like cells "Susceptibilities not performed"  --  --      .Blood Blood-Peripheral  01-17 @ 17:49   No Growth Final  --  --      .Surgical Swab left foot wound  11-02 @ 13:15   Few Staphylococcus epidermidis  "Susceptibilities not performed"  --  --            RADIOLOGY:      ROS:  [  ] UNABLE TO ELICIT

## 2022-01-23 NOTE — PROGRESS NOTE ADULT - ASSESSMENT
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 Patient received 3L bolus in ED, s/p BP was still low 70/30s. Patient admitted to ICU for septic shock requiring pressors.     #Septic shock 2/2 ulcers vs UTI  #Delirium  #Acute renal failure  #Penile ulcer  #Sacral ulcer  #Diabetic foot ulcer  #Elevated INR  #Elevated LFTs    -NEURO  Patient AAOx3  Lethargy was likely delirium due to infection  CTH showed  Moderate periventricular and deep white matter ischemia. Encephalomalacia and gliosis in RIGHT occipital lobe. Global atrophy. 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    -PULMONARY  No active issues at present    -CVS  #Septic shock likely 2/2 ulcers vs UTI  Patient presented with hypotension  Was given 3 L bolus in ED, BP was still low  S/p 6L of IVF total   C/w Levophed, off Vasopressin -- started midodrine 5 mg q8hrs   C/w stress dose of Solu-cortef 50 mg q12 hrs, deescalating   C/w Zosyn, D/c Linezolid today 1/21/22  Started on Diflucan 100 mg qd for 5 days   No more fluids for now, Will monitor UO  Started IV albumin x 4 doses   d/c levo  UA +ve and Ucx yeast like   ID Dr Randhawa on board     -GI  Has elevated INR and AST  Likely 2/2 septic shock  Will monitor CMP  Diet Puree renal diet    -RENAL  #Acute renal failure- around 4.2  Patient has normal creatinine at baseline, 1.18 Nov 2021  Presented with elevated BUN/cr of 51/4.45  Most likely ATN from hypoperfusion   S/p 6L bolus, No more fluids for now  Will monitor UO and BMP  Nephro on board     -ID  Septic shock 2/2 ulcers vs UTI  Patient has septic shock 2/2 SSTI  C/w Zosyn, D/c Linezolid today 1/21/22  Started on Diflucan 100 mg qd for 5 days   UA +ve and Ucx yeast like   ID Dr Randhawa on board    -ENDO  DM  Patient has h/o IDDM, on 10 units of Lantus at bedtime  Diet Puree renal diet   Monitor ACHS, FS before meals and at bedtime   HSS  Started Remeron for appetite     -SKIN  Patient has stage 3/4 sacral ulcer, penile ulcer, foot ulcer  Wound care on board  Dr Garcia consulted  C/w Linezolid and Zosyn  ID Dr Randhawa on board   on board    -Prophylactic measure  C/w Eliquis dose adjusted to 2.5 BID  C/w ppi    -GOC   Full Code  75 yo male from [facility] assisted living with medical history of DM on insulin, HTN, HLD, CAD S/p CABG, 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 Patient received 3L bolus in ED, s/p BP was still low 70/30s. Patient admitted to ICU for septic shock requiring pressors.     #Septic shock 2/2 ulcers vs UTI  #Delirium  #Acute renal failure  #Penile ulcer  #Sacral ulcer  #Diabetic foot ulcer  #Elevated INR  #Elevated LFTs    -NEURO  Patient AAOx2  Lethargy was likely delirium due to infection  CTH showed  Moderate periventricular and deep white matter ischemia. Encephalomalacia and gliosis in RIGHT occipital lobe. Global atrophy. 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    -PULMONARY  No active issues at present    -CVS  #Septic shock likely 2/2 ulcers vs UTI  Patient presented with hypotension  Was given 3 L bolus in ED, BP was still low  S/p 6L of IVF total   C/w Levophed, off Vasopressin -- started midodrine 5 mg q8hrs   C/w stress dose of Solu-cortef 50 mg q12 hrs, deescalating   C/w Zosyn, D/c Linezolid today 1/21/22  Started on Diflucan 100 mg qd for 5 days   No more fluids for now, Will monitor UO  Started IV albumin x 4 doses   d/c levo-off pressors  UA +ve and Ucx yeast like   ID Dr Randhawa on board     -GI  Has elevated INR and AST  Likely 2/2 septic shock  Will monitor CMP  Diet Puree renal diet    -RENAL  #Acute renal failure- around 4.2  Patient has normal creatinine at baseline, 1.18 Nov 2021  Presented with elevated BUN/cr of 51/4.45  Most likely ATN from hypoperfusion   S/p 6L bolus, No more fluids for now  Will monitor UO and BMP  Nephro on board     -ID  Septic shock 2/2 ulcers vs UTI  Patient has septic shock 2/2 SSTI  C/w Zosyn, D/c Linezolid today 1/21/22  Started on Diflucan 100 mg qd for 5 days   UA +ve and Ucx yeast like   ID Dr Randhawa on board    -ENDO  DM  Patient has h/o IDDM, on 10 units of Lantus at bedtime  Diet Puree renal diet   Monitor ACHS, FS before meals and at bedtime   HSS  Started Remeron for appetite     -SKIN  Patient has stage 3/4 sacral ulcer, penile ulcer, foot ulcer  Wound care on board  Dr Garcia consulted  off Linezolid   c/w Zosyn  ID Dr Randhawa on board   on board    -Prophylactic measure  C/w Eliquis dose adjusted to 2.5 BID  C/w ppi    -GOC   Full Code  75 yo male from [facility] assisted living with medical history of DM on insulin, HTN, HLD, CAD S/p CABG, 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 Patient received 3L bolus in ED, s/p BP was still low 70/30s. Patient admitted to ICU for septic shock requiring pressors.     #Septic shock 2/2 ulcers vs UTI  #Delirium  #Acute renal failure  #Penile ulcer  #Sacral ulcer  #Diabetic foot ulcer  #Elevated INR  #Elevated LFTs    -NEURO  Patient AAOx2  Lethargy was likely delirium due to infection  CTH showed  Moderate periventricular and deep white matter ischemia. Encephalomalacia and gliosis in RIGHT occipital lobe. Global atrophy. 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    -PULMONARY  No active issues at present    -CVS  #Septic shock likely 2/2 ulcers vs UTI  Patient presented with hypotension  Was given 3 L bolus in ED, BP was still low  S/p 6L of IVF total   C/w Levophed, off Vasopressin -- started midodrine 5 mg q8hrs   C/w stress dose of Solu-cortef 50 mg q12 hrs, deescalating   C/w Zosyn, D/c Linezolid today 1/21/22  Started on Diflucan 100 mg qd for 5 days   No more fluids for now, Will monitor UO  Started IV albumin x 4 doses   d/c levo-off pressors  UA +ve and Ucx yeast like   ID Dr Randhawa on board     #PAF  - Patient has hx of PAF on Eliquis 5 mg BID at home  - C/w Eliquis 2.5 mg BID for now while ATN resolves    -GI  Has elevated INR and AST  Likely 2/2 septic shock  Will monitor CMP  Diet Puree renal diet    -RENAL  #Acute renal failure- around 4.2  Patient has normal creatinine at baseline, 1.18 Nov 2021  Presented with elevated BUN/cr of 51/4.45  Most likely ATN from hypoperfusion   S/p 6L bolus, No more fluids for now  Will monitor UO and BMP  Nephro on board     -ID  Septic shock 2/2 ulcers vs UTI  Patient has septic shock 2/2 SSTI  C/w Zosyn, D/c Linezolid today 1/21/22  Started on Diflucan 100 mg qd for 5 days   UA +ve and Ucx yeast like   ID Dr Randhawa on board    -ENDO  DM  Patient has h/o IDDM, on 10 units of Lantus at bedtime  Diet Puree renal diet   Monitor ACHS, FS before meals and at bedtime   HSS  C/w Remeron for appetite     -SKIN  Patient has stage 3/4 sacral ulcer, penile ulcer, foot ulcer  Off Linezolid, off Zosyn  Wound care on board  Dr Garcia on board   ID Dr Randhawa on board   on board    -Prophylactic measure  C/w Eliquis dose adjusted to 2.5 BID  C/w ppi    -GOC   Full Code

## 2022-01-23 NOTE — PROGRESS NOTE ADULT - ASSESSMENT
Septic Shock - resolved  Leukocytosis - normalized    Plan - Can decrease Diflucan to 100mg po daily  Time spent - 31 mins

## 2022-01-23 NOTE — PROGRESS NOTE ADULT - ASSESSMENT
1. OREN due to ATN.  -scr is unchanged with stable electrolytes; UO is improving and no urgent need for HD.   -urinalysis shows granular casts. urine lytes not done.   -Adjust meds to eGFR and avoid IV Gadolinium contrast,NSAIDs, and phosphate enema.  -Monitor I/O's daily.   -Monitor SMA daily.  2. CKD stage 3 most likely due to diabetic nephropathy  -Baseline Scr around 1.2mg/dL  -Keep patient euvolemic and renal diet  -Avoid Nephrotoxic Meds/ Agents such as (NSAIDs, IV contrast, Aminoglycosides such as gentamicin, -Gadolinium contrast, Phosphate containing enemas, etc..)  -Adjust Medications according to eGFR  3. Anemia: hb is stable. monitor CBC  4. Hypotension: maintain MAP >60 and off low dose pressor.  5. MBD: phos is okay    Patient is critically ill. Time Spent: 35mins.  Discussed the assessment and plan with ICU

## 2022-01-23 NOTE — PROGRESS NOTE ADULT - SUBJECTIVE AND OBJECTIVE BOX
INTERVAL HPI/OVERNIGHT EVENTS: No overnight events     PRESSORS: [ ] YES [x ] NO      Antimicrobial:  fluconAZOLE   Tablet 100 milliGRAM(s) Oral daily  piperacillin/tazobactam IVPB.. 3.375 Gram(s) IV Intermittent every 12 hours    Cardiovascular:  midodrine 5 milliGRAM(s) Oral every 8 hours  norepinephrine Infusion 0.501 MICROgram(s)/kG/Min IV Continuous <Continuous>    Pulmonary:    Hematalogic:  apixaban 2.5 milliGRAM(s) Oral two times a day  aspirin  chewable 81 milliGRAM(s) Oral daily    Other:  acetaminophen     Tablet .. 650 milliGRAM(s) Oral every 6 hours PRN  atorvastatin 40 milliGRAM(s) Oral at bedtime  chlorhexidine 2% Cloths 1 Application(s) Topical daily  finasteride 5 milliGRAM(s) Oral daily  hydrocortisone sodium succinate Injectable 50 milliGRAM(s) IV Push every 12 hours  influenza  Vaccine (HIGH DOSE) 0.7 milliLiter(s) IntraMuscular once  insulin lispro (ADMELOG) corrective regimen sliding scale   SubCutaneous Before meals and at bedtime  mirtazapine 7.5 milliGRAM(s) Oral daily  mupirocin 2% Ointment 1 Application(s) Topical two times a day  pantoprazole    Tablet 40 milliGRAM(s) Oral before breakfast    acetaminophen     Tablet .. 650 milliGRAM(s) Oral every 6 hours PRN  apixaban 2.5 milliGRAM(s) Oral two times a day  aspirin  chewable 81 milliGRAM(s) Oral daily  atorvastatin 40 milliGRAM(s) Oral at bedtime  chlorhexidine 2% Cloths 1 Application(s) Topical daily  finasteride 5 milliGRAM(s) Oral daily  fluconAZOLE   Tablet 100 milliGRAM(s) Oral daily  hydrocortisone sodium succinate Injectable 50 milliGRAM(s) IV Push every 12 hours  influenza  Vaccine (HIGH DOSE) 0.7 milliLiter(s) IntraMuscular once  insulin lispro (ADMELOG) corrective regimen sliding scale   SubCutaneous Before meals and at bedtime  midodrine 5 milliGRAM(s) Oral every 8 hours  mirtazapine 7.5 milliGRAM(s) Oral daily  mupirocin 2% Ointment 1 Application(s) Topical two times a day  norepinephrine Infusion 0.501 MICROgram(s)/kG/Min IV Continuous <Continuous>  pantoprazole    Tablet 40 milliGRAM(s) Oral before breakfast  piperacillin/tazobactam IVPB.. 3.375 Gram(s) IV Intermittent every 12 hours    Drug Dosing Weight  Height (cm): 170.2 (17 Jan 2022 11:33)  Weight (kg): 60.7 (17 Jan 2022 16:10)  BMI (kg/m2): 21 (17 Jan 2022 16:10)  BSA (m2): 1.7 (17 Jan 2022 16:10)    CENTRAL LINE: [ ] YES [ ] NO  LOCATION:         CASTAÑEDA: [ ] YES [ ] NO          A-LINE:  [ ] YES [ ] NO  LOCATION:             ICU Vital Signs Last 24 Hrs  T(C): 36.1 (23 Jan 2022 05:07), Max: 36.8 (22 Jan 2022 19:47)  T(F): 97 (23 Jan 2022 05:07), Max: 98.3 (22 Jan 2022 19:47)  HR: 81 (23 Jan 2022 05:00) (56 - 81)  BP: 106/51 (23 Jan 2022 05:00) (80/48 - 109/48)  BP(mean): 66 (23 Jan 2022 05:00) (56 - 72)  ABP: --  ABP(mean): --  RR: 14 (23 Jan 2022 05:00) (8 - 22)  SpO2: 99% (23 Jan 2022 05:00) (96% - 100%)            01-21 @ 07:01  -  01-22 @ 07:00  --------------------------------------------------------  IN: 630.5 mL / OUT: 565 mL / NET: 65.5 mL              PHYSICAL EXAM:    GENERAL: NAD, RIJ, cachectic, temporal wasting  EYES: EOMI, PERRLA  NECK: Supple, No JVD; Normal thyroid; Trachea midline: No LAD   NERVOUS SYSTEM:  Alert & Oriented X3,  Motor Strength 5/5 B/L upper and lower extremities; DTRs 2+ intact and symmetric  CHEST/LUNG: No rales, rhonchi, wheezing, breath sounds present bilaterally  HEART: Regular rate and rhythm; No murmurs, no gallops  ABDOMEN: Soft, Nontender, Nondistended; Bowel sounds present, no pain or masses on palpation  : Castañeda in place, penile ulcer  EXTREMITIES:  2+ Peripheral Pulses, No clubbing, cyanosis, or edema  SKIN: warm, multiple skin ulcer on back, lower extremities and penile area           LABS:  CBC Full  -  ( 22 Jan 2022 03:24 )  WBC Count : 6.20 K/uL  RBC Count : 3.19 M/uL  Hemoglobin : 9.2 g/dL  Hematocrit : 27.3 %  Platelet Count - Automated : 160 K/uL  Mean Cell Volume : 85.6 fl  Mean Cell Hemoglobin : 28.8 pg  Mean Cell Hemoglobin Concentration : 33.7 gm/dL  Auto Neutrophil # : x  Auto Lymphocyte # : x  Auto Monocyte # : x  Auto Eosinophil # : x  Auto Basophil # : x  Auto Neutrophil % : x  Auto Lymphocyte % : x  Auto Monocyte % : x  Auto Eosinophil % : x  Auto Basophil % : x    01-22    137  |  105  |  66<H>  ----------------------------<  183<H>  3.4<L>   |  20<L>  |  4.22<H>    Ca    7.8<L>      22 Jan 2022 03:24  Phos  4.3     01-22  Mg     2.1     01-22    TPro  5.7<L>  /  Alb  2.2<L>  /  TBili  0.5  /  DBili  x   /  AST  28  /  ALT  27  /  AlkPhos  77  01-22            RADIOLOGY & ADDITIONAL STUDIES REVIEWED:  ***    [ ]GOALS OF CARE DISCUSSION WITH PATIENT/FAMILY/PROXY:    CRITICAL CARE TIME SPENT: 35 minutes   INTERVAL HPI/OVERNIGHT EVENTS: No overnight events. Patient is off pressors    PRESSORS: [ ] YES [x ] NO      Antimicrobial:  fluconAZOLE   Tablet 100 milliGRAM(s) Oral daily  piperacillin/tazobactam IVPB.. 3.375 Gram(s) IV Intermittent every 12 hours    Cardiovascular:  midodrine 5 milliGRAM(s) Oral every 8 hours  norepinephrine Infusion 0.501 MICROgram(s)/kG/Min IV Continuous <Continuous>    Pulmonary:    Hematalogic:  apixaban 2.5 milliGRAM(s) Oral two times a day  aspirin  chewable 81 milliGRAM(s) Oral daily    Other:  acetaminophen     Tablet .. 650 milliGRAM(s) Oral every 6 hours PRN  atorvastatin 40 milliGRAM(s) Oral at bedtime  chlorhexidine 2% Cloths 1 Application(s) Topical daily  finasteride 5 milliGRAM(s) Oral daily  hydrocortisone sodium succinate Injectable 50 milliGRAM(s) IV Push every 12 hours  influenza  Vaccine (HIGH DOSE) 0.7 milliLiter(s) IntraMuscular once  insulin lispro (ADMELOG) corrective regimen sliding scale   SubCutaneous Before meals and at bedtime  mirtazapine 7.5 milliGRAM(s) Oral daily  mupirocin 2% Ointment 1 Application(s) Topical two times a day  pantoprazole    Tablet 40 milliGRAM(s) Oral before breakfast    acetaminophen     Tablet .. 650 milliGRAM(s) Oral every 6 hours PRN  apixaban 2.5 milliGRAM(s) Oral two times a day  aspirin  chewable 81 milliGRAM(s) Oral daily  atorvastatin 40 milliGRAM(s) Oral at bedtime  chlorhexidine 2% Cloths 1 Application(s) Topical daily  finasteride 5 milliGRAM(s) Oral daily  fluconAZOLE   Tablet 100 milliGRAM(s) Oral daily  hydrocortisone sodium succinate Injectable 50 milliGRAM(s) IV Push every 12 hours  influenza  Vaccine (HIGH DOSE) 0.7 milliLiter(s) IntraMuscular once  insulin lispro (ADMELOG) corrective regimen sliding scale   SubCutaneous Before meals and at bedtime  midodrine 5 milliGRAM(s) Oral every 8 hours  mirtazapine 7.5 milliGRAM(s) Oral daily  mupirocin 2% Ointment 1 Application(s) Topical two times a day  norepinephrine Infusion 0.501 MICROgram(s)/kG/Min IV Continuous <Continuous>  pantoprazole    Tablet 40 milliGRAM(s) Oral before breakfast  piperacillin/tazobactam IVPB.. 3.375 Gram(s) IV Intermittent every 12 hours    Drug Dosing Weight  Height (cm): 170.2 (17 Jan 2022 11:33)  Weight (kg): 60.7 (17 Jan 2022 16:10)  BMI (kg/m2): 21 (17 Jan 2022 16:10)  BSA (m2): 1.7 (17 Jan 2022 16:10)    CENTRAL LINE: [ ] YES [ ] NO  LOCATION:         CASTAÑEDA: [ ] YES [ ] NO          A-LINE:  [ ] YES [ ] NO  LOCATION:             ICU Vital Signs Last 24 Hrs  T(C): 36.1 (23 Jan 2022 05:07), Max: 36.8 (22 Jan 2022 19:47)  T(F): 97 (23 Jan 2022 05:07), Max: 98.3 (22 Jan 2022 19:47)  HR: 81 (23 Jan 2022 05:00) (56 - 81)  BP: 106/51 (23 Jan 2022 05:00) (80/48 - 109/48)  BP(mean): 66 (23 Jan 2022 05:00) (56 - 72)  RR: 14 (23 Jan 2022 05:00) (8 - 22)  SpO2: 99% (23 Jan 2022 05:00) (96% - 100%)    01-21 @ 07:01  -  01-22 @ 07:00  --------------------------------------------------------  IN: 630.5 mL / OUT: 565 mL / NET: 65.5 mL              PHYSICAL EXAM:    GENERAL: NAD, RIJ, cachectic, temporal wasting  EYES: EOMI, PERRLA  NECK: Supple, No JVD; Normal thyroid; Trachea midline: No LAD   NERVOUS SYSTEM:  Alert & Oriented 2, lethargic  CHEST/LUNG: No rales, rhonchi, wheezing, breath sounds present bilaterally  HEART: Regular rate and rhythm; No murmurs, no gallops  ABDOMEN: Soft, Nontender, Nondistended; Bowel sounds present, no pain or masses on palpation  : Castañeda in place, penile ulcer  EXTREMITIES:  2+ Peripheral Pulses, No clubbing, cyanosis, or edema  SKIN: warm, multiple skin ulcer on back, lower extremities and penile area           LABS:  CBC Full  -  ( 22 Jan 2022 03:24 )  WBC Count : 6.20 K/uL  RBC Count : 3.19 M/uL  Hemoglobin : 9.2 g/dL  Hematocrit : 27.3 %  Platelet Count - Automated : 160 K/uL  Mean Cell Volume : 85.6 fl  Mean Cell Hemoglobin : 28.8 pg  Mean Cell Hemoglobin Concentration : 33.7 gm/dL  Auto Neutrophil # : x  Auto Lymphocyte # : x  Auto Monocyte # : x  Auto Eosinophil # : x  Auto Basophil # : x  Auto Neutrophil % : x  Auto Lymphocyte % : x  Auto Monocyte % : x  Auto Eosinophil % : x  Auto Basophil % : x    01-22    137  |  105  |  66<H>  ----------------------------<  183<H>  3.4<L>   |  20<L>  |  4.22<H>    Ca    7.8<L>      22 Jan 2022 03:24  Phos  4.3     01-22  Mg     2.1     01-22    TPro  5.7<L>  /  Alb  2.2<L>  /  TBili  0.5  /  DBili  x   /  AST  28  /  ALT  27  /  AlkPhos  77  01-22            RADIOLOGY & ADDITIONAL STUDIES REVIEWED:  ***    [ ]GOALS OF CARE DISCUSSION WITH PATIENT/FAMILY/PROXY:    CRITICAL CARE TIME SPENT: 35 minutes

## 2022-01-24 LAB
ALBUMIN SERPL ELPH-MCNC: 2.3 G/DL — LOW (ref 3.5–5)
ALP SERPL-CCNC: 82 U/L — SIGNIFICANT CHANGE UP (ref 40–120)
ALT FLD-CCNC: 25 U/L DA — SIGNIFICANT CHANGE UP (ref 10–60)
ANION GAP SERPL CALC-SCNC: 9 MMOL/L — SIGNIFICANT CHANGE UP (ref 5–17)
AST SERPL-CCNC: 23 U/L — SIGNIFICANT CHANGE UP (ref 10–40)
BILIRUB SERPL-MCNC: 0.5 MG/DL — SIGNIFICANT CHANGE UP (ref 0.2–1.2)
BUN SERPL-MCNC: 79 MG/DL — HIGH (ref 7–18)
CALCIUM SERPL-MCNC: 8.2 MG/DL — LOW (ref 8.4–10.5)
CHLORIDE SERPL-SCNC: 112 MMOL/L — HIGH (ref 96–108)
CO2 SERPL-SCNC: 19 MMOL/L — LOW (ref 22–31)
CREAT SERPL-MCNC: 4.11 MG/DL — HIGH (ref 0.5–1.3)
GLUCOSE BLDC GLUCOMTR-MCNC: 86 MG/DL — SIGNIFICANT CHANGE UP (ref 70–99)
GLUCOSE BLDC GLUCOMTR-MCNC: 98 MG/DL — SIGNIFICANT CHANGE UP (ref 70–99)
GLUCOSE BLDC GLUCOMTR-MCNC: 98 MG/DL — SIGNIFICANT CHANGE UP (ref 70–99)
GLUCOSE SERPL-MCNC: 117 MG/DL — HIGH (ref 70–99)
HCT VFR BLD CALC: 32.8 % — LOW (ref 39–50)
HGB BLD-MCNC: 10.8 G/DL — LOW (ref 13–17)
MAGNESIUM SERPL-MCNC: 2.1 MG/DL — SIGNIFICANT CHANGE UP (ref 1.6–2.6)
MCHC RBC-ENTMCNC: 28.7 PG — SIGNIFICANT CHANGE UP (ref 27–34)
MCHC RBC-ENTMCNC: 32.9 GM/DL — SIGNIFICANT CHANGE UP (ref 32–36)
MCV RBC AUTO: 87.2 FL — SIGNIFICANT CHANGE UP (ref 80–100)
NRBC # BLD: 0 /100 WBCS — SIGNIFICANT CHANGE UP (ref 0–0)
PHOSPHATE SERPL-MCNC: 3.4 MG/DL — SIGNIFICANT CHANGE UP (ref 2.5–4.5)
PLATELET # BLD AUTO: 144 K/UL — LOW (ref 150–400)
POTASSIUM SERPL-MCNC: 4.1 MMOL/L — SIGNIFICANT CHANGE UP (ref 3.5–5.3)
POTASSIUM SERPL-SCNC: 4.1 MMOL/L — SIGNIFICANT CHANGE UP (ref 3.5–5.3)
PROT SERPL-MCNC: 6.1 G/DL — SIGNIFICANT CHANGE UP (ref 6–8.3)
RBC # BLD: 3.76 M/UL — LOW (ref 4.2–5.8)
RBC # FLD: 17.2 % — HIGH (ref 10.3–14.5)
SARS-COV-2 RNA SPEC QL NAA+PROBE: SIGNIFICANT CHANGE UP
SODIUM SERPL-SCNC: 140 MMOL/L — SIGNIFICANT CHANGE UP (ref 135–145)
WBC # BLD: 15.01 K/UL — HIGH (ref 3.8–10.5)
WBC # FLD AUTO: 15.01 K/UL — HIGH (ref 3.8–10.5)

## 2022-01-24 RX ORDER — METOPROLOL TARTRATE 50 MG
12.5 TABLET ORAL EVERY 12 HOURS
Refills: 0 | Status: DISCONTINUED | OUTPATIENT
Start: 2022-01-24 | End: 2022-02-10

## 2022-01-24 RX ORDER — ONDANSETRON 8 MG/1
4 TABLET, FILM COATED ORAL EVERY 8 HOURS
Refills: 0 | Status: DISCONTINUED | OUTPATIENT
Start: 2022-01-24 | End: 2022-02-10

## 2022-01-24 RX ORDER — METOPROLOL TARTRATE 50 MG
100 TABLET ORAL DAILY
Refills: 0 | Status: DISCONTINUED | OUTPATIENT
Start: 2022-01-24 | End: 2022-01-24

## 2022-01-24 RX ORDER — METOPROLOL TARTRATE 50 MG
25 TABLET ORAL
Refills: 0 | Status: DISCONTINUED | OUTPATIENT
Start: 2022-01-24 | End: 2022-01-24

## 2022-01-24 RX ORDER — TAMSULOSIN HYDROCHLORIDE 0.4 MG/1
0.4 CAPSULE ORAL AT BEDTIME
Refills: 0 | Status: DISCONTINUED | OUTPATIENT
Start: 2022-01-25 | End: 2022-02-10

## 2022-01-24 RX ADMIN — CHLORHEXIDINE GLUCONATE 1 APPLICATION(S): 213 SOLUTION TOPICAL at 12:05

## 2022-01-24 RX ADMIN — ATORVASTATIN CALCIUM 40 MILLIGRAM(S): 80 TABLET, FILM COATED ORAL at 22:13

## 2022-01-24 RX ADMIN — Medication 0: at 22:13

## 2022-01-24 RX ADMIN — Medication 12.5 MILLIGRAM(S): at 18:43

## 2022-01-24 RX ADMIN — Medication 81 MILLIGRAM(S): at 14:56

## 2022-01-24 RX ADMIN — FLUCONAZOLE 100 MILLIGRAM(S): 150 TABLET ORAL at 14:57

## 2022-01-24 RX ADMIN — MIRTAZAPINE 7.5 MILLIGRAM(S): 45 TABLET, ORALLY DISINTEGRATING ORAL at 14:57

## 2022-01-24 RX ADMIN — MIDODRINE HYDROCHLORIDE 5 MILLIGRAM(S): 2.5 TABLET ORAL at 22:13

## 2022-01-24 RX ADMIN — MIDODRINE HYDROCHLORIDE 5 MILLIGRAM(S): 2.5 TABLET ORAL at 14:36

## 2022-01-24 RX ADMIN — FINASTERIDE 5 MILLIGRAM(S): 5 TABLET, FILM COATED ORAL at 14:56

## 2022-01-24 RX ADMIN — APIXABAN 2.5 MILLIGRAM(S): 2.5 TABLET, FILM COATED ORAL at 17:36

## 2022-01-24 NOTE — PROGRESS NOTE ADULT - SUBJECTIVE AND OBJECTIVE BOX
NEPHROLOGY MEDICAL CARE, [facility] - Dr. Tariq Herrera/ Dr. Lauren Lopez/ Dr. Dre Call/ Dr. Destiny Knott    Patient was seen and examined at bedside.    CC: patient is okay and bp is stable.    Vital Signs Last 24 Hrs  T(C): 36.2 (24 Jan 2022 07:00), Max: 36.2 (23 Jan 2022 16:21)  T(F): 97.1 (24 Jan 2022 07:00), Max: 97.2 (23 Jan 2022 16:21)  HR: 87 (24 Jan 2022 12:00) (80 - 93)  BP: 119/64 (24 Jan 2022 12:00) (97/55 - 143/70)  BP(mean): 78 (24 Jan 2022 12:00) (63 - 87)  RR: 13 (24 Jan 2022 12:00) (11 - 24)  SpO2: 100% (24 Jan 2022 12:00) (93% - 100%)    01-23 @ 07:01 - 01-24 @ 07:00  --------------------------------------------------------  IN: 0 mL / OUT: 725 mL / NET: -725 mL    01-24 @ 07:01 - 01-24 @ 13:51  --------------------------------------------------------  IN: 0 mL / OUT: 25 mL / NET: -25 mL        PHYSICAL EXAM:  General: No acute respiratory distress.  Eyes: conjunctiva and sclera clear  ENMT: Atraumatic, Normocephalic, supple, No JVD present. Moist mucous membranes  Respiratory: Bilateral poor air entry; No rales, rhonchi, wheezing  Cardiovassular: S1S2+; no m/r/g  Gastrointestinal: Soft, Non-tender, Nondistended; Bowel sounds present  : ibrahim's cath with brown colored urine  Neuro:  Awake but confused..   Ext: trace edema  Skin: No visible rashes    MEDICATIONS:  MEDICATIONS  (STANDING):  apixaban 2.5 milliGRAM(s) Oral two times a day  aspirin  chewable 81 milliGRAM(s) Oral daily  atorvastatin 40 milliGRAM(s) Oral at bedtime  chlorhexidine 2% Cloths 1 Application(s) Topical daily  finasteride 5 milliGRAM(s) Oral daily  fluconAZOLE   Tablet 100 milliGRAM(s) Oral daily  influenza  Vaccine (HIGH DOSE) 0.7 milliLiter(s) IntraMuscular once  insulin lispro (ADMELOG) corrective regimen sliding scale   SubCutaneous Before meals and at bedtime  metoprolol tartrate 25 milliGRAM(s) Oral two times a day  midodrine 5 milliGRAM(s) Oral every 8 hours  mirtazapine 7.5 milliGRAM(s) Oral daily  mupirocin 2% Ointment 1 Application(s) Topical two times a day  pantoprazole    Tablet 40 milliGRAM(s) Oral before breakfast    MEDICATIONS  (PRN):  acetaminophen     Tablet .. 650 milliGRAM(s) Oral every 6 hours PRN Temp greater or equal to 38C (100.4F), Mild Pain (1 - 3)          LABS:                        10.8   15.01 )-----------( 144      ( 24 Jan 2022 05:16 )             32.8     01-24    140  |  112<H>  |  79<H>  ----------------------------<  117<H>  4.1   |  19<L>  |  4.11<H>    Ca    8.2<L>      24 Jan 2022 05:16  Phos  3.4     01-24  Mg     2.1     01-24    TPro  6.1  /  Alb  2.3<L>  /  TBili  0.5  /  DBili  x   /  AST  23  /  ALT  25  /  AlkPhos  82  01-24        Magnesium, Serum: 2.1 mg/dL (01-24 @ 05:16)  Phosphorus Level, Serum: 3.4 mg/dL (01-24 @ 05:16)    Urine studies    PTH and Vit D:

## 2022-01-24 NOTE — CHART NOTE - NSCHARTNOTEFT_GEN_A_CORE
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 Patient received 3L bolus in ED, s/p BP was still low 70/30s. Patient was admitted to ICU for septic shock 2/2 UTI requiring pressors.   For septic shock 2/2 UTI (yeast like), patient received 6L of IVF, he was on Levophed, vasopressin, tapered off now on midodrine 5 mg q8 hrs, stress dose of Solu cortef for 3 days, IV albumin x 4 doses, completed Zosyn and is on Diflucan 100 mg qd for 5 days to be completed tomorrow 1/25/22.    Patient had acute infectious encephalopathy, CTH showed moderate periventricular and deep white matter ischemia. Encephalomalacia and gliosis in right occipital lobe. Global atrophy. 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 Patient's mental status improved to baseline AAx2-3.  For PAF, patient is on Eliquis 5 mg BID at home, was continued on 2.5 mg BID as patient presented with OREN.   For OREN on CKD, was most likely from ATN, s/p IVF, creatinine improving, no need for HD. Nephro Dr Herrera on board.   For DM, patient is on Lantus at home, now on HSS only and on Remeron to help with appetite.   For wounds (penile, scrotum, LE and sacral area) patient was seen by wound care, recommendations and Podiatry on board.   For hypertension, on lisinopril, nifedipine, holding for now as patient was just weaned off pressors and is on Midodrine.    For CAD, continued on lowered dose metoprolol 12.5 mg BID, statin and ASA.   For mild transaminitis, most likely secondary to septic shock now resolved.    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 Patient received 3L bolus in ED, s/p BP was still low 70/30s. Patient was admitted to ICU for septic shock 2/2 UTI requiring pressors.   For septic shock 2/2 UTI (yeast like), patient received 6L of IVF, he was on Levophed, vasopressin, tapered off now on midodrine 5 mg q8 hrs, stress dose of Solu cortef for 3 days, IV albumin x 4 doses, completed Zosyn and is on Diflucan 100 mg qd for 5 days to be completed tomorrow 1/25/22.    Patient had acute infectious encephalopathy, CTH showed moderate periventricular and deep white matter ischemia. Encephalomalacia and gliosis in right occipital lobe. Global atrophy. 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 Patient's mental status improved to baseline AAx2-3.  For PAF, patient is on Eliquis 5 mg BID at home, was continued on 2.5 mg BID as patient presented with OREN.   For OREN on CKD, was most likely from ATN, s/p IVF, creatinine improving, no need for HD. Nephro Dr Herrera on board.   For DM, patient is on Lantus at home, now on HSS only and on Remeron to help with appetite.   For wounds (penile, scrotum, LE and sacral area) patient was seen by wound care, recommendations and Podiatry on board.   For hypertension, on lisinopril, nifedipine, holding for now as patient was just weaned off pressors and is on Midodrine.    For CAD, continued on lowered dose metoprolol 12.5 mg BID, statin and ASA.   For mild transaminitis, most likely secondary to septic shock now resolved.    Things to follow:  - Monitor BP and resume/adjust antihypertensive meds as needed   - Adjust Eliquis and insulin upon discharge   - Increase BB as tolerated   - Titrate down midodrine as tolerated   - Follow nephro, ID and podiatry recommendations    Patient signed out to NP Alejandra and attending Dr Barrett. 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 Patient received 3L bolus in ED, s/p BP was still low 70/30s. Patient was admitted to ICU for septic shock 2/2 UTI requiring pressors.   For septic shock 2/2 UTI (yeast like), patient received 6L of IVF, he was on Levophed, vasopressin, tapered off now on midodrine 5 mg q8 hrs, stress dose of Solu cortef for 3 days, IV albumin x 4 doses, completed Zosyn and is on Diflucan 100 mg qd for 5 days to be completed tomorrow 1/25/22.    Patient had acute infectious encephalopathy, CTH showed moderate periventricular and deep white matter ischemia. Encephalomalacia and gliosis in right occipital lobe. Global atrophy. 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 Patient's mental status improved to baseline AAx2-3.  For PAF, patient is on Eliquis 5 mg BID at home, was continued on 2.5 mg BID as patient presented with OREN.   For OREN on CKD, was most likely from ATN, s/p IVF, creatinine improving, no need for HD. Nephro Dr Herrera on board.   For DM, patient is on Lantus at home, now on HSS only and on Remeron to help with appetite.   For wounds (penile, scrotum, LE and sacral area) patient was seen by wound care, recommendations and Podiatry on board.   For hypertension, on lisinopril, nifedipine, holding for now as patient was just weaned off pressors and is on Midodrine.    For CAD, continued on lowered dose metoprolol 12.5 mg BID, statin and ASA.   For mild transaminitis, most likely secondary to septic shock now resolved.  For BPH on Flomax and Proscar, resumed.    Things to follow:  - Monitor BP and resume/adjust antihypertensive meds as needed   - Adjust Eliquis and insulin upon discharge   - Increase BB as tolerated   - Titrate down midodrine as tolerated   - Follow nephro, ID and podiatry recommendations    Patient signed out to NP Alejandra and attending Dr Barrett.

## 2022-01-24 NOTE — PROGRESS NOTE ADULT - ATTENDING COMMENTS
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 Patient received 3L bolus in ED, s/p Bp was still low 70/30s. ICU was consulted for evaluation. Patient is being admitted to ICU for septic shock requiring pressors.       - ASSESSMENT   - Septic shock 2/2 ulcers vs UTI  - Delirium / Encephalopathy   - Acute renal failure  - Penile ulcer  - Sacral ulcer  - Diabetic foot ulcer  - Elevated INR  - Elevated LFTs  - OREN       Plan     - O2 supp as needed   - Broad spec antibx   - Cultures   - Hemodynamic support  - Off Vasopressors   - Midodrine 5 q8h  - Albumin 25 gm iv q6h x 4 doses   - D/C Zyvox  - Diflucan   - Remeron   - Off  vasopressin   - Taper  stress dose steroid.. solucortef 50 q8h  - Molina cath placement by urology   - Monitor renal fx  - Wound care on case   - Podiatry eval Dr Garcia   - Monitor blood sugar with coverage   - IVF  - Monitor renal fx  - DVT GI prophy .
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 Patient received 3L bolus in ED, s/p Bp was still low 70/30s. ICU was consulted for evaluation. Patient is being admitted to ICU for septic shock requiring pressors.       - ASSESSMENT   - Septic shock 2/2 ulcers vs UTI  - Delirium / Encephalopathy   - Acute renal failure  - Penile ulcer  - Sacral ulcer  - Diabetic foot ulcer  - Elevated INR  - Elevated LFTs  - OREN       Plan   - O2 supp as needed   - Taper midodrine  - Diflucan   - Remeron   - Off  vasopressin   - Taper  stress dose steroid  - Molina cath placement by urology   - Monitor renal fx  - Wound care on case   - Podiatry eval Dr Garcia   - Monitor blood sugar with coverage   - IVF  - Monitor renal fx  - DVT GI prophy  - Transfer to medical service
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 Patient received 3L bolus in ED, s/p Bp was still low 70/30s. ICU was consulted for evaluation. Patient is being admitted to ICU for septic shock requiring pressors.       - ASSESSMENT   - Septic shock 2/2 ulcers vs UTI  - Delirium / Encephalopathy   - Acute renal failure  - Penile ulcer  - Sacral ulcer  - Diabetic foot ulcer  - Elevated INR  - Elevated LFTs  - OREN       Plan     - O2 supp as needed   - Broad spec antibx   - Cultures   - Hemodynamic support  - S/P 6 L ivf   - Vasopressors  need to to maintain MAP>65 is decreasing   - Add vasopressin   - Taper  stress dose steroid.. solucortef 50 q8h  - Molina cath placement by urology   - Monitor renal fx  - Wound care on case   - Podiatry eval Dr Garcia   - Monitor blood sugar with coverage   - IVF  - Monitor renal fx  - DVT GI prophy .
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 Patient received 3L bolus in ED, s/p Bp was still low 70/30s. ICU was consulted for evaluation. Patient is being admitted to ICU for septic shock requiring pressors.       - ASSESSMENT   - Septic shock 2/2 ulcers vs UTI  - Delirium / Encephalopathy   - Acute renal failure  - Penile ulcer  - Sacral ulcer  - Diabetic foot ulcer  - Elevated INR  - Elevated LFTs  - OREN       Plan     - O2 supp as needed   - Broad spec antibx   - Cultures   - Hemodynamic support  - S/P 6 L ivf   - Vasopressors  need to to maintain MAP>65 is decreasing   - Add vasopressin   - Taper  stress dose steroid.. solucortef 50 q8h  - Molina cath placement by urology   - Monitor renal fx  - Wound care on case   - Podiatry eval Dr Garcia   - Monitor blood sugar with coverage   - IVF  - Monitor renal fx  - DVT GI prophy .
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 Patient received 3L bolus in ED, s/p Bp was still low 70/30s. ICU was consulted for evaluation. Patient is being admitted to ICU for septic shock requiring pressors.       - ASSESSMENT   - Septic shock 2/2 ulcers vs UTI  - Delirium / Encephalopathy   - Acute renal failure  - Penile ulcer  - Sacral ulcer  - Diabetic foot ulcer  - Elevated INR  - Elevated LFTs  - OREN       Plan     - O2 supp as needed   - D/C antibx   - Cultures neg   - Hemodynamic support  - Off Vasopressors   - Midodrine 5 q8h  - Albumin 25 gm iv q6h x 4 doses   - D/C Zyvox  - Diflucan x 2 more days  - Remeron   - Off  vasopressin   - Taper  stress dose steroid.. solucortef 50 q8h  - Molina cath placement by urology   - Monitor renal fx  - Wound care on case   - Podiatry eval Dr Garcia   - Monitor blood sugar with coverage   - IVF  - Monitor renal fx  - DVT GI prophy .
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 Patient received 3L bolus in ED, s/p Bp was still low 70/30s. ICU was consulted for evaluation. Patient is being admitted to ICU for septic shock requiring pressors.       - ASSESSMENT   - Septic shock 2/2 ulcers vs UTI  - Delirium / Encephalopathy   - Acute renal failure  - Penile ulcer  - Sacral ulcer  - Diabetic foot ulcer  - Elevated INR  - Elevated LFTs  - OREN       Plan     - O2 supp as needed   - Broad spec antibx   - Cultures   - Hemodynamic support  - S/P 6 L ivf   - Vasopressors  need to to maintain MAP>65 is decreasing   - Albumin 25 gm iv q6h x 4 doses   - D/C Zyvox  - Diflucan   - Remeron   - Off  vasopressin   - Taper  stress dose steroid.. solucortef 50 q8h  - Molina cath placement by urology   - Monitor renal fx  - Wound care on case   - Podiatry eval Dr Garcia   - Monitor blood sugar with coverage   - IVF  - Monitor renal fx  - DVT GI prophy .
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 Patient received 3L bolus in ED, s/p Bp was still low 70/30s. ICU was consulted for evaluation. Patient is being admitted to ICU for septic shock requiring pressors.       - ASSESSMENT   - Septic shock 2/2 ulcers vs UTI  - Delirium / Encephalopathy   - Acute renal failure  - Penile ulcer  - Sacral ulcer  - Diabetic foot ulcer  - Elevated INR  - Elevated LFTs  - OREN       Plan     - O2 supp as needed   - Broad spec antibx   - Cultures   - Hemodynamic support  - S/P 6 L ivf   - Vasopressors to maintain MAP>65  - Add vasopressin   - Add stress dose steroid.. solucortef 50 q6h  - Molina cath placement   - Monitor renal fx  -  eval   - Wound care  - Podiatry eval Dr Garcia   - Monitor blood sugar with coverage   - IVF  - Monitor renal fx  - DVT GI prophy .

## 2022-01-24 NOTE — CHART NOTE - NSCHARTNOTEFT_GEN_A_CORE
Spoke to daughter Arabella to update on patient's clinical status. Daughter was informed that patient was off pressors, finished Zosyn and has one more day of Diflucan. She was also made aware that he does not require ICU care and is going to be downgraded to 4S.

## 2022-01-24 NOTE — PROGRESS NOTE ADULT - SUBJECTIVE AND OBJECTIVE BOX
INTERVAL HPI/OVERNIGHT EVENTS: ***    PRESSORS: [ ] YES [ ] NO  WHICH:    ANTIBIOTICS:                  DATE STARTED:  ANTIBIOTICS:                  DATE STARTED:  ANTIBIOTICS:                  DATE STARTED:    Antimicrobial:  fluconAZOLE   Tablet 100 milliGRAM(s) Oral daily    Cardiovascular:  midodrine 5 milliGRAM(s) Oral every 8 hours    Pulmonary:    Hematalogic:  apixaban 2.5 milliGRAM(s) Oral two times a day  aspirin  chewable 81 milliGRAM(s) Oral daily    Other:  acetaminophen     Tablet .. 650 milliGRAM(s) Oral every 6 hours PRN  atorvastatin 40 milliGRAM(s) Oral at bedtime  chlorhexidine 2% Cloths 1 Application(s) Topical daily  finasteride 5 milliGRAM(s) Oral daily  hydrocortisone sodium succinate Injectable 50 milliGRAM(s) IV Push every 12 hours  influenza  Vaccine (HIGH DOSE) 0.7 milliLiter(s) IntraMuscular once  insulin lispro (ADMELOG) corrective regimen sliding scale   SubCutaneous Before meals and at bedtime  mirtazapine 7.5 milliGRAM(s) Oral daily  mupirocin 2% Ointment 1 Application(s) Topical two times a day  pantoprazole    Tablet 40 milliGRAM(s) Oral before breakfast    acetaminophen     Tablet .. 650 milliGRAM(s) Oral every 6 hours PRN  apixaban 2.5 milliGRAM(s) Oral two times a day  aspirin  chewable 81 milliGRAM(s) Oral daily  atorvastatin 40 milliGRAM(s) Oral at bedtime  chlorhexidine 2% Cloths 1 Application(s) Topical daily  finasteride 5 milliGRAM(s) Oral daily  fluconAZOLE   Tablet 100 milliGRAM(s) Oral daily  hydrocortisone sodium succinate Injectable 50 milliGRAM(s) IV Push every 12 hours  influenza  Vaccine (HIGH DOSE) 0.7 milliLiter(s) IntraMuscular once  insulin lispro (ADMELOG) corrective regimen sliding scale   SubCutaneous Before meals and at bedtime  midodrine 5 milliGRAM(s) Oral every 8 hours  mirtazapine 7.5 milliGRAM(s) Oral daily  mupirocin 2% Ointment 1 Application(s) Topical two times a day  pantoprazole    Tablet 40 milliGRAM(s) Oral before breakfast    Drug Dosing Weight  Height (cm): 170.2 (17 Jan 2022 11:33)  Weight (kg): 60.7 (17 Jan 2022 16:10)  BMI (kg/m2): 21 (17 Jan 2022 16:10)  BSA (m2): 1.7 (17 Jan 2022 16:10)    CENTRAL LINE: [ ] YES [ ] NO  LOCATION:         CASTAÑEDA: [ ] YES [ ] NO          A-LINE:  [ ] YES [ ] NO  LOCATION:             ICU Vital Signs Last 24 Hrs  T(C): 36.1 (24 Jan 2022 05:00), Max: 36.2 (23 Jan 2022 16:21)  T(F): 96.9 (24 Jan 2022 05:00), Max: 97.2 (23 Jan 2022 16:21)  HR: 86 (24 Jan 2022 06:00) (77 - 93)  BP: 128/57 (24 Jan 2022 06:00) (97/55 - 143/70)  BP(mean): 74 (24 Jan 2022 06:00) (63 - 87)  ABP: --  ABP(mean): --  RR: 11 (24 Jan 2022 06:00) (10 - 24)  SpO2: 97% (24 Jan 2022 06:00) (93% - 100%)            01-23 @ 07:01  -  01-24 @ 07:00  --------------------------------------------------------  IN: 0 mL / OUT: 705 mL / NET: -705 mL              PHYSICAL EXAM:    GENERAL: NAD  EYES: EOMI, PERRLA  NECK: Supple, No JVD; Normal thyroid; Trachea midline: No LAD   NERVOUS SYSTEM:  Alert & Oriented X3,  Motor Strength 5/5 B/L upper and lower extremities; DTRs 2+ intact and symmetric  CHEST/LUNG: No rales, rhonchi, wheezing, breath sounds present bilaterally  HEART: Regular rate and rhythm; No murmurs, no gallops  ABDOMEN: Soft, Nontender, Nondistended; Bowel sounds present, no pain or masses on palpation  : voiding well, Castañeda in place  EXTREMITIES:  2+ Peripheral Pulses, No clubbing, cyanosis, or edema  SKIN: warm, intact, no lesions         LABS:  CBC Full  -  ( 24 Jan 2022 05:16 )  WBC Count : 15.01 K/uL  RBC Count : 3.76 M/uL  Hemoglobin : 10.8 g/dL  Hematocrit : 32.8 %  Platelet Count - Automated : 144 K/uL  Mean Cell Volume : 87.2 fl  Mean Cell Hemoglobin : 28.7 pg  Mean Cell Hemoglobin Concentration : 32.9 gm/dL  Auto Neutrophil # : x  Auto Lymphocyte # : x  Auto Monocyte # : x  Auto Eosinophil # : x  Auto Basophil # : x  Auto Neutrophil % : x  Auto Lymphocyte % : x  Auto Monocyte % : x  Auto Eosinophil % : x  Auto Basophil % : x    01-24    140  |  112<H>  |  79<H>  ----------------------------<  117<H>  4.1   |  19<L>  |  4.11<H>    Ca    8.2<L>      24 Jan 2022 05:16  Phos  3.4     01-24  Mg     2.1     01-24    TPro  6.1  /  Alb  2.3<L>  /  TBili  0.5  /  DBili  x   /  AST  23  /  ALT  25  /  AlkPhos  82  01-24            RADIOLOGY & ADDITIONAL STUDIES REVIEWED:  ***    [ ]GOALS OF CARE DISCUSSION WITH PATIENT/FAMILY/PROXY:    CRITICAL CARE TIME SPENT: 35 minutes   INTERVAL HPI/OVERNIGHT EVENTS: patient is off pressors, on midodrine.     PRESSORS: [ ] YES [x ] NO      Antimicrobial:  fluconAZOLE   Tablet 100 milliGRAM(s) Oral daily    Cardiovascular:  midodrine 5 milliGRAM(s) Oral every 8 hours    Pulmonary:    Hematalogic:  apixaban 2.5 milliGRAM(s) Oral two times a day  aspirin  chewable 81 milliGRAM(s) Oral daily    Other:  acetaminophen     Tablet .. 650 milliGRAM(s) Oral every 6 hours PRN  atorvastatin 40 milliGRAM(s) Oral at bedtime  chlorhexidine 2% Cloths 1 Application(s) Topical daily  finasteride 5 milliGRAM(s) Oral daily  hydrocortisone sodium succinate Injectable 50 milliGRAM(s) IV Push every 12 hours  influenza  Vaccine (HIGH DOSE) 0.7 milliLiter(s) IntraMuscular once  insulin lispro (ADMELOG) corrective regimen sliding scale   SubCutaneous Before meals and at bedtime  mirtazapine 7.5 milliGRAM(s) Oral daily  mupirocin 2% Ointment 1 Application(s) Topical two times a day  pantoprazole    Tablet 40 milliGRAM(s) Oral before breakfast    acetaminophen     Tablet .. 650 milliGRAM(s) Oral every 6 hours PRN  apixaban 2.5 milliGRAM(s) Oral two times a day  aspirin  chewable 81 milliGRAM(s) Oral daily  atorvastatin 40 milliGRAM(s) Oral at bedtime  chlorhexidine 2% Cloths 1 Application(s) Topical daily  finasteride 5 milliGRAM(s) Oral daily  fluconAZOLE   Tablet 100 milliGRAM(s) Oral daily  hydrocortisone sodium succinate Injectable 50 milliGRAM(s) IV Push every 12 hours  influenza  Vaccine (HIGH DOSE) 0.7 milliLiter(s) IntraMuscular once  insulin lispro (ADMELOG) corrective regimen sliding scale   SubCutaneous Before meals and at bedtime  midodrine 5 milliGRAM(s) Oral every 8 hours  mirtazapine 7.5 milliGRAM(s) Oral daily  mupirocin 2% Ointment 1 Application(s) Topical two times a day  pantoprazole    Tablet 40 milliGRAM(s) Oral before breakfast    Drug Dosing Weight  Height (cm): 170.2 (17 Jan 2022 11:33)  Weight (kg): 60.7 (17 Jan 2022 16:10)  BMI (kg/m2): 21 (17 Jan 2022 16:10)  BSA (m2): 1.7 (17 Jan 2022 16:10)    CENTRAL LINE: [ ] YES [ ] NO  LOCATION:         CASTAÑEDA: [ ] YES [ ] NO          A-LINE:  [ ] YES [ ] NO  LOCATION:             ICU Vital Signs Last 24 Hrs  T(C): 36.1 (24 Jan 2022 05:00), Max: 36.2 (23 Jan 2022 16:21)  T(F): 96.9 (24 Jan 2022 05:00), Max: 97.2 (23 Jan 2022 16:21)  HR: 86 (24 Jan 2022 06:00) (77 - 93)  BP: 128/57 (24 Jan 2022 06:00) (97/55 - 143/70)  BP(mean): 74 (24 Jan 2022 06:00) (63 - 87)  ABP: --  ABP(mean): --  RR: 11 (24 Jan 2022 06:00) (10 - 24)  SpO2: 97% (24 Jan 2022 06:00) (93% - 100%)            01-23 @ 07:01  -  01-24 @ 07:00  --------------------------------------------------------  IN: 0 mL / OUT: 705 mL / NET: -705 mL              PHYSICAL EXAM:    PHYSICAL EXAM:  GENERAL: NAD, RIJ, cachectic, temporal wasting  EYES: EOMI, PERRLA  NECK: Supple, No JVD; Normal thyroid; Trachea midline: No LAD   NERVOUS SYSTEM:  Alert & Oriented X3,  Motor Strength 5/5 B/L upper and lower extremities; DTRs 2+ intact and symmetric  CHEST/LUNG: No rales, rhonchi, wheezing, breath sounds present bilaterally  HEART: Regular rate and rhythm; No murmurs, no gallops  ABDOMEN: Soft, Nontender, Nondistended; Bowel sounds present, no pain or masses on palpation  : Castañeda in place, penile ulcer  EXTREMITIES:  2+ Peripheral Pulses, No clubbing, cyanosis, or edema  SKIN: warm, multiple skin ulcer on back, lower extremities and penile area             LABS:  CBC Full  -  ( 24 Jan 2022 05:16 )  WBC Count : 15.01 K/uL  RBC Count : 3.76 M/uL  Hemoglobin : 10.8 g/dL  Hematocrit : 32.8 %  Platelet Count - Automated : 144 K/uL  Mean Cell Volume : 87.2 fl  Mean Cell Hemoglobin : 28.7 pg  Mean Cell Hemoglobin Concentration : 32.9 gm/dL  Auto Neutrophil # : x  Auto Lymphocyte # : x  Auto Monocyte # : x  Auto Eosinophil # : x  Auto Basophil # : x  Auto Neutrophil % : x  Auto Lymphocyte % : x  Auto Monocyte % : x  Auto Eosinophil % : x  Auto Basophil % : x    01-24    140  |  112<H>  |  79<H>  ----------------------------<  117<H>  4.1   |  19<L>  |  4.11<H>    Ca    8.2<L>      24 Jan 2022 05:16  Phos  3.4     01-24  Mg     2.1     01-24    TPro  6.1  /  Alb  2.3<L>  /  TBili  0.5  /  DBili  x   /  AST  23  /  ALT  25  /  AlkPhos  82  01-24            RADIOLOGY & ADDITIONAL STUDIES REVIEWED:  ***    [ ]GOALS OF CARE DISCUSSION WITH PATIENT/FAMILY/PROXY:    CRITICAL CARE TIME SPENT: 35 minutes

## 2022-01-24 NOTE — PROGRESS NOTE ADULT - ASSESSMENT
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 Patient received 3L bolus in ED, s/p BP was still low 70/30s. Patient admitted to ICU for septic shock requiring pressors.     #Septic shock 2/2 ulcers vs UTI  #Delirium  #Acute renal failure  #Penile ulcer  #Sacral ulcer  #Diabetic foot ulcer  #Elevated INR  #Elevated LFTs    -NEURO  Patient AAOx 3  Lethargy was likely delirium due to infection  CTH showed  Moderate periventricular and deep white matter ischemia. Encephalomalacia and gliosis in RIGHT occipital lobe. Global atrophy. 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    -PULMONARY  No active issues at present    -CVS  #Septic shock likely 2/2 ulcers vs UTI-- Resolved   Patient presented with hypotension  Was given 3 L bolus in ED, BP was still low  S/p 6L of IVF total   Off Levophed, off Vasopressin   C/w midodrine 5 mg q8hrs   Off Solu cortef   Completed Zosyn, Off Linezolid since 1/21/22  C/w Diflucan 100 mg qd for 5 days   S/p IV albumin x 4 doses   UA +ve and Ucx yeast like   ID Dr Randhawa on board     #PAF  - Patient has hx of PAF on Eliquis 5 mg BID at home  - C/w Eliquis 2.5 mg BID for now while ATN resolves     -GI  Has elevated INR and AST  Likely 2/2 septic shock  Will monitor CMP  Diet Puree renal diet    -RENAL  #Acute renal failure- around 4.2  Patient has normal creatinine at baseline, 1.18 Nov 2021  Presented with elevated BUN/cr of 51/4.45  Most likely ATN from hypoperfusion   S/p 6L bolus, No more fluids for now  Will monitor UO and BMP  Nephro on board     -ID  Septic shock 2/2 ulcers vs UTI  Patient has septic shock 2/2 SSTI  Completed Zosyn, Off Linezolid since 1/21/22  C/w Diflucan 100 mg qd for 5 days   UA +ve and Ucx yeast like   ID Dr Randhawa on board    -ENDO  DM  Patient has h/o IDDM, on 10 units of Lantus at bedtime  Diet Puree renal diet   Monitor ACHS, FS before meals and at bedtime   HSS  C/w Remeron for appetite     -SKIN  Patient has stage 3/4 sacral ulcer, penile ulcer, foot ulcer  Wound care on board  Completed Zosyn, Off Linezolid since 1/21/22  C/w Diflucan 100 mg qd for 5 days   Dr Garcia on board   ID Dr Randhawa on board   on board    -Prophylactic measure  C/w Eliquis dose adjusted to 2.5 BID  C/w ppi    -GOC   Full Code

## 2022-01-24 NOTE — PROGRESS NOTE ADULT - ASSESSMENT
1. OREN due to ATN.  -scr has improved with stable electrolytes; UO is improving and no urgent need for HD.   -urinalysis shows granular casts. urine lytes not done.   -Adjust meds to eGFR and avoid IV Gadolinium contrast,NSAIDs, and phosphate enema.  -Monitor I/O's daily.   -Monitor SMA daily.  2. CKD stage 3 most likely due to diabetic nephropathy  -Baseline Scr around 1.2mg/dL  -Keep patient euvolemic and renal diet  -Avoid Nephrotoxic Meds/ Agents such as (NSAIDs, IV contrast, Aminoglycosides such as gentamicin, -Gadolinium contrast, Phosphate containing enemas, etc..)  -Adjust Medications according to eGFR  3. Anemia: hb is stable. monitor CBC  4. Hypotension: bp is stable. maintain MAP >60 and off low dose pressor.  5. MBD: phos is okay    Patient is critically ill. Time Spent: 35mins.  Discussed the assessment and plan with ICU

## 2022-01-25 LAB
ANION GAP SERPL CALC-SCNC: 10 MMOL/L — SIGNIFICANT CHANGE UP (ref 5–17)
BUN SERPL-MCNC: 77 MG/DL — HIGH (ref 7–18)
CALCIUM SERPL-MCNC: 8.5 MG/DL — SIGNIFICANT CHANGE UP (ref 8.4–10.5)
CHLORIDE SERPL-SCNC: 116 MMOL/L — HIGH (ref 96–108)
CO2 SERPL-SCNC: 19 MMOL/L — LOW (ref 22–31)
CREAT SERPL-MCNC: 3.79 MG/DL — HIGH (ref 0.5–1.3)
GLUCOSE BLDC GLUCOMTR-MCNC: 84 MG/DL — SIGNIFICANT CHANGE UP (ref 70–99)
GLUCOSE BLDC GLUCOMTR-MCNC: 84 MG/DL — SIGNIFICANT CHANGE UP (ref 70–99)
GLUCOSE BLDC GLUCOMTR-MCNC: 89 MG/DL — SIGNIFICANT CHANGE UP (ref 70–99)
GLUCOSE BLDC GLUCOMTR-MCNC: 89 MG/DL — SIGNIFICANT CHANGE UP (ref 70–99)
GLUCOSE SERPL-MCNC: 82 MG/DL — SIGNIFICANT CHANGE UP (ref 70–99)
HCT VFR BLD CALC: 28.2 % — LOW (ref 39–50)
HGB BLD-MCNC: 9.6 G/DL — LOW (ref 13–17)
MAGNESIUM SERPL-MCNC: 2.1 MG/DL — SIGNIFICANT CHANGE UP (ref 1.6–2.6)
MCHC RBC-ENTMCNC: 29.6 PG — SIGNIFICANT CHANGE UP (ref 27–34)
MCHC RBC-ENTMCNC: 34 GM/DL — SIGNIFICANT CHANGE UP (ref 32–36)
MCV RBC AUTO: 87 FL — SIGNIFICANT CHANGE UP (ref 80–100)
NRBC # BLD: 0 /100 WBCS — SIGNIFICANT CHANGE UP (ref 0–0)
PHOSPHATE SERPL-MCNC: 3.2 MG/DL — SIGNIFICANT CHANGE UP (ref 2.5–4.5)
PLATELET # BLD AUTO: 102 K/UL — LOW (ref 150–400)
POTASSIUM SERPL-MCNC: 4.3 MMOL/L — SIGNIFICANT CHANGE UP (ref 3.5–5.3)
POTASSIUM SERPL-SCNC: 4.3 MMOL/L — SIGNIFICANT CHANGE UP (ref 3.5–5.3)
RBC # BLD: 3.24 M/UL — LOW (ref 4.2–5.8)
RBC # FLD: 17.4 % — HIGH (ref 10.3–14.5)
SODIUM SERPL-SCNC: 145 MMOL/L — SIGNIFICANT CHANGE UP (ref 135–145)
WBC # BLD: 22.09 K/UL — HIGH (ref 3.8–10.5)
WBC # FLD AUTO: 22.09 K/UL — HIGH (ref 3.8–10.5)

## 2022-01-25 RX ADMIN — MIDODRINE HYDROCHLORIDE 5 MILLIGRAM(S): 2.5 TABLET ORAL at 21:49

## 2022-01-25 RX ADMIN — ATORVASTATIN CALCIUM 40 MILLIGRAM(S): 80 TABLET, FILM COATED ORAL at 21:49

## 2022-01-25 RX ADMIN — MIRTAZAPINE 7.5 MILLIGRAM(S): 45 TABLET, ORALLY DISINTEGRATING ORAL at 11:10

## 2022-01-25 RX ADMIN — PANTOPRAZOLE SODIUM 40 MILLIGRAM(S): 20 TABLET, DELAYED RELEASE ORAL at 06:05

## 2022-01-25 RX ADMIN — Medication 81 MILLIGRAM(S): at 11:11

## 2022-01-25 RX ADMIN — FLUCONAZOLE 100 MILLIGRAM(S): 150 TABLET ORAL at 11:10

## 2022-01-25 RX ADMIN — Medication 12.5 MILLIGRAM(S): at 17:53

## 2022-01-25 RX ADMIN — APIXABAN 2.5 MILLIGRAM(S): 2.5 TABLET, FILM COATED ORAL at 17:53

## 2022-01-25 RX ADMIN — MIDODRINE HYDROCHLORIDE 5 MILLIGRAM(S): 2.5 TABLET ORAL at 13:33

## 2022-01-25 RX ADMIN — TAMSULOSIN HYDROCHLORIDE 0.4 MILLIGRAM(S): 0.4 CAPSULE ORAL at 21:49

## 2022-01-25 RX ADMIN — MUPIROCIN 1 APPLICATION(S): 20 OINTMENT TOPICAL at 06:04

## 2022-01-25 RX ADMIN — MIDODRINE HYDROCHLORIDE 5 MILLIGRAM(S): 2.5 TABLET ORAL at 06:05

## 2022-01-25 RX ADMIN — FINASTERIDE 5 MILLIGRAM(S): 5 TABLET, FILM COATED ORAL at 11:10

## 2022-01-25 RX ADMIN — Medication 12.5 MILLIGRAM(S): at 06:05

## 2022-01-25 RX ADMIN — APIXABAN 2.5 MILLIGRAM(S): 2.5 TABLET, FILM COATED ORAL at 06:05

## 2022-01-25 NOTE — PROGRESS NOTE ADULT - SUBJECTIVE AND OBJECTIVE BOX
NEPHROLOGY MEDICAL CARE, [facility] - Dr. Tariq Herrera/ Dr. Laruen Lopez/ Dr. Dre Call/ Dr. Destiny Knott    Patient was seen and examined at bedside.    CC: patient is still confused.     Vital Signs Last 24 Hrs  T(C): 36.8 (25 Jan 2022 13:55), Max: 36.8 (25 Jan 2022 13:55)  T(F): 98.3 (25 Jan 2022 13:55), Max: 98.3 (25 Jan 2022 13:55)  HR: 81 (25 Jan 2022 13:55) (81 - 97)  BP: 132/66 (25 Jan 2022 13:55) (100/65 - 135/75)  BP(mean): 65 (24 Jan 2022 17:00) (65 - 65)  RR: 18 (25 Jan 2022 13:55) (18 - 22)  SpO2: 100% (25 Jan 2022 13:55) (94% - 100%)    01-24 @ 07:01  -  01-25 @ 07:00  --------------------------------------------------------  IN: 0 mL / OUT: 50 mL / NET: -50 mL        PHYSICAL EXAM:  General: No acute respiratory distress.  Eyes: conjunctiva and sclera clear  ENMT: Atraumatic, Normocephalic, supple, No JVD present. Moist mucous membranes  Respiratory: Bilateral poor air entry; No rales, rhonchi, wheezing  Cardiovassular: S1S2+; no m/r/g  Gastrointestinal: Soft, Non-tender, Nondistended; Bowel sounds present  : ibrahim's cath with brown colored urine  Neuro:  Awake but confused..   Ext: trace edema  Skin: No visible rashes      MEDICATIONS:  MEDICATIONS  (STANDING):  apixaban 2.5 milliGRAM(s) Oral two times a day  aspirin  chewable 81 milliGRAM(s) Oral daily  atorvastatin 40 milliGRAM(s) Oral at bedtime  finasteride 5 milliGRAM(s) Oral daily  influenza  Vaccine (HIGH DOSE) 0.7 milliLiter(s) IntraMuscular once  insulin lispro (ADMELOG) corrective regimen sliding scale   SubCutaneous Before meals and at bedtime  metoprolol tartrate 12.5 milliGRAM(s) Oral every 12 hours  midodrine 5 milliGRAM(s) Oral every 8 hours  mirtazapine 7.5 milliGRAM(s) Oral daily  pantoprazole    Tablet 40 milliGRAM(s) Oral before breakfast  tamsulosin 0.4 milliGRAM(s) Oral at bedtime    MEDICATIONS  (PRN):  acetaminophen     Tablet .. 650 milliGRAM(s) Oral every 6 hours PRN Temp greater or equal to 38C (100.4F), Mild Pain (1 - 3)  ondansetron Injectable 4 milliGRAM(s) IV Push every 8 hours PRN Nausea and/or Vomiting          LABS:                        9.6    22.09 )-----------( 102      ( 25 Jan 2022 07:50 )             28.2     01-25    145  |  116<H>  |  77<H>  ----------------------------<  82  4.3   |  19<L>  |  3.79<H>    Ca    8.5      25 Jan 2022 07:50  Phos  3.2     01-25  Mg     2.1     01-25    TPro  6.1  /  Alb  2.3<L>  /  TBili  0.5  /  DBili  x   /  AST  23  /  ALT  25  /  AlkPhos  82  01-24        Magnesium, Serum: 2.1 mg/dL (01-25 @ 07:50)  Phosphorus Level, Serum: 3.2 mg/dL (01-25 @ 07:50)    Urine studies    PTH and Vit D:

## 2022-01-25 NOTE — PROGRESS NOTE ADULT - ASSESSMENT
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 Patient received 3L bolus in ED, s/p BP was still low 70/30s. Patient admitted to ICU for septic shock requiring pressors.     #Septic shock 2/2 ulcers vs UTI  #Delirium  #Acute renal failure  #Penile ulcer  #Sacral ulcer  #Diabetic foot ulcer  #Elevated INR  #Elevated LFTs    -NEURO  Patient AAOx 3  Lethargy was likely delirium due to infection  CTH showed  Moderate periventricular and deep white matter ischemia. Encephalomalacia and gliosis in RIGHT occipital lobe. Global atrophy. 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    -PULMONARY  No active issues at present    -CVS  #Septic shock likely 2/2 ulcers vs UTI-- Resolved   Patient presented with hypotension  Was given 3 L bolus in ED, BP was still low  S/p 6L of IVF total   Off Levophed, off Vasopressin   C/w midodrine 5 mg q8hrs   Off Solu cortef   Completed Zosyn, Off Linezolid since 1/21/22  C/w Diflucan 100 mg qd for 5 days   S/p IV albumin x 4 doses   UA +ve and Ucx yeast like   ID Dr Randhawa on board     #PAF  - Patient has hx of PAF on Eliquis 5 mg BID at home  - C/w Eliquis 2.5 mg BID for now while ATN resolves     -GI  Has elevated INR and AST  Likely 2/2 septic shock  Will monitor CMP  Diet Puree renal diet    -RENAL  #Acute renal failure- around 4.2  Patient has normal creatinine at baseline, 1.18 Nov 2021  Presented with elevated BUN/cr of 51/4.45  Most likely ATN from hypoperfusion   S/p 6L bolus, No more fluids for now  Will monitor UO and BMP  Nephro on board     -ID  Septic shock 2/2 ulcers vs UTI  Patient has septic shock 2/2 SSTI  Completed Zosyn, Off Linezolid since 1/21/22  C/w Diflucan 100 mg qd for 5 days   UA +ve and Ucx yeast like   ID Dr Randhawa on board    -ENDO  DM  Patient has h/o IDDM, on 10 units of Lantus at bedtime  Diet Puree renal diet   Monitor ACHS, FS before meals and at bedtime   HSS  C/w Remeron for appetite     -SKIN  Patient has stage 3/4 sacral ulcer, penile ulcer, foot ulcer  Wound care on board  Completed Zosyn, Off Linezolid since 1/21/22  C/w Diflucan 100 mg qd for 5 days   Dr Garcia on board   ID Dr Randhawa on board   on board    -Prophylactic measure  C/w Eliquis dose adjusted to 2.5 BID  C/w ppi    -GOC   Full Code

## 2022-01-25 NOTE — PROGRESS NOTE ADULT - SUBJECTIVE AND OBJECTIVE BOX
76y Male is under our care for     REVIEW OF SYSTEMS:  [  ] Not able to elicit  General:	  Chest:	  GI:	  :  Skin:	  Musculoskeletal:	  Neuro:	    MEDS:  fluconAZOLE   Tablet 100 milliGRAM(s) Oral daily    ALLERGIES: Allergies    No Known Allergies    Intolerances        VITALS:  Vital Signs Last 24 Hrs  T(C): 36.7 (25 Jan 2022 05:06), Max: 36.7 (25 Jan 2022 05:06)  T(F): 98 (25 Jan 2022 05:06), Max: 98 (25 Jan 2022 05:06)  HR: 84 (25 Jan 2022 05:06) (84 - 97)  BP: 115/72 (25 Jan 2022 05:06) (94/49 - 135/75)  BP(mean): 65 (24 Jan 2022 17:00) (59 - 88)  RR: 18 (25 Jan 2022 05:06) (12 - 22)  SpO2: 98% (25 Jan 2022 05:06) (94% - 100%)      PHYSICAL EXAM:  HEENT:  Neck:  Respiratory:  Cardiovascular:  Gastrointestinal:  Extremities:  Skin:  Ortho:  Neuro:    LABS/DIAGNOSTIC TESTS:                        9.6    22.09 )-----------( 102      ( 25 Jan 2022 07:50 )             28.2     WBC Count: 22.09 K/uL (01-25 @ 07:50)  WBC Count: 15.01 K/uL (01-24 @ 05:16)  WBC Count: 8.64 K/uL (01-23 @ 05:29)  WBC Count: 6.20 K/uL (01-22 @ 03:24)  WBC Count: 7.91 K/uL (01-21 @ 04:17)    01-25    145  |  116<H>  |  77<H>  ----------------------------<  82  4.3   |  19<L>  |  3.79<H>    Ca    8.5      25 Jan 2022 07:50  Phos  3.2     01-25  Mg     2.1     01-25    TPro  6.1  /  Alb  2.3<L>  /  TBili  0.5  /  DBili  x   /  AST  23  /  ALT  25  /  AlkPhos  82  01-24      CULTURES:   Catheterized Catheterized  01-19 @ 11:06   10,000 - 49,000 CFU/mL Yeast like cells "Susceptibilities not performed"  --  --      .Blood Blood-Peripheral  01-17 @ 17:49   No Growth Final  --  --      .Surgical Swab left foot wound  11-02 @ 13:15   Few Staphylococcus epidermidis  "Susceptibilities not performed"  --  --        RADIOLOGY:  no new studies 76y Male is under our care for leukocytosis and UTI.  Patient was downgraded to medicine service yesterday and was seen laying comfortably in bed with no acute distress.  He remains afebrile and with elevated WBC count.    REVIEW OF SYSTEMS:  [  ] Not able to elicit  General: no fevers, malaise +  Chest: no cough no sob  GI: no nvd  : no urinary sxs   Skin: no rashes  Musculoskeletal: no trauma no LBP  Neuro: no ha's no dizziness     MEDS:  fluconAZOLE   Tablet 100 milliGRAM(s) Oral daily    ALLERGIES: Allergies    No Known Allergies    Intolerances        VITALS:  Vital Signs Last 24 Hrs  T(C): 36.7 (25 Jan 2022 05:06), Max: 36.7 (25 Jan 2022 05:06)  T(F): 98 (25 Jan 2022 05:06), Max: 98 (25 Jan 2022 05:06)  HR: 84 (25 Jan 2022 05:06) (84 - 97)  BP: 115/72 (25 Jan 2022 05:06) (94/49 - 135/75)  BP(mean): 65 (24 Jan 2022 17:00) (59 - 88)  RR: 18 (25 Jan 2022 05:06) (12 - 22)  SpO2: 98% (25 Jan 2022 05:06) (94% - 100%)      PHYSICAL EXAM:  HEENT: n/a  Neck: supple  Respiratory: lungs clear no rales  Cardiovascular: S1 S2 reg no murmurs  Gastrointestinal: +BS with soft, nondistended abdomen; nontender  : Molina catheter in place with cloudy urine  Extremities: no edema  Skin: Bilateral feet necrotic ulcers  Ortho: n/a  Neuro: Awake and alert    LABS/DIAGNOSTIC TESTS:                        9.6    22.09 )-----------( 102      ( 25 Jan 2022 07:50 )             28.2     WBC Count: 22.09 K/uL (01-25 @ 07:50)  WBC Count: 15.01 K/uL (01-24 @ 05:16)  WBC Count: 8.64 K/uL (01-23 @ 05:29)  WBC Count: 6.20 K/uL (01-22 @ 03:24)  WBC Count: 7.91 K/uL (01-21 @ 04:17)    01-25    145  |  116<H>  |  77<H>  ----------------------------<  82  4.3   |  19<L>  |  3.79<H>    Ca    8.5      25 Jan 2022 07:50  Phos  3.2     01-25  Mg     2.1     01-25    TPro  6.1  /  Alb  2.3<L>  /  TBili  0.5  /  DBili  x   /  AST  23  /  ALT  25  /  AlkPhos  82  01-24      CULTURES:   Catheterized Catheterized  01-19 @ 11:06   10,000 - 49,000 CFU/mL Yeast like cells "Susceptibilities not performed"  --  --      .Blood Blood-Peripheral  01-17 @ 17:49   No Growth Final  --  --      .Surgical Swab left foot wound  11-02 @ 13:15   Few Staphylococcus epidermidis  "Susceptibilities not performed"  --  --        RADIOLOGY:  no new studies

## 2022-01-25 NOTE — PROGRESS NOTE ADULT - ASSESSMENT
Septic Shock - resolved  Leukocytosis    Plan - Continue Diflucan to 100mg po daily, can DC after todays dose as he would complete a 5 day course           Septic Shock - resolved  Leukocytosis - increasing     Plan - Continue Diflucan to 100mg po daily, can DC after today's dose as he would complete a 5 day course  will monitor WBC count for now, no antibiotics as yet.    I agree with above

## 2022-01-25 NOTE — PROGRESS NOTE ADULT - ASSESSMENT
1. OREN due to ATN.  -scr is improving with renal recovery and stable electrolytes.   -urinalysis shows granular casts. urine lytes not done.   -Adjust meds to eGFR and avoid IV Gadolinium contrast,NSAIDs, and phosphate enema.  -Monitor I/O's daily.   -Monitor SMA daily.  2. CKD stage 3 most likely due to diabetic nephropathy  -Baseline Scr around 1.2mg/dL  -Keep patient euvolemic and renal diet  -Avoid Nephrotoxic Meds/ Agents such as (NSAIDs, IV contrast, Aminoglycosides such as gentamicin, -Gadolinium contrast, Phosphate containing enemas, etc..)  -Adjust Medications according to eGFR  3. Anemia: hb is stable. monitor CBC  4. Hypotension: bp is stable.   5. MBD: phos is okay. check Vitamin D and PTH in am.

## 2022-01-26 DIAGNOSIS — A41.9 SEPSIS, UNSPECIFIED ORGANISM: ICD-10-CM

## 2022-01-26 DIAGNOSIS — E11.9 TYPE 2 DIABETES MELLITUS WITHOUT COMPLICATIONS: ICD-10-CM

## 2022-01-26 DIAGNOSIS — N48.5 ULCER OF PENIS: ICD-10-CM

## 2022-01-26 DIAGNOSIS — Z29.9 ENCOUNTER FOR PROPHYLACTIC MEASURES, UNSPECIFIED: ICD-10-CM

## 2022-01-26 DIAGNOSIS — E11.621 TYPE 2 DIABETES MELLITUS WITH FOOT ULCER: ICD-10-CM

## 2022-01-26 DIAGNOSIS — L98.429 NON-PRESSURE CHRONIC ULCER OF BACK WITH UNSPECIFIED SEVERITY: ICD-10-CM

## 2022-01-26 DIAGNOSIS — N17.9 ACUTE KIDNEY FAILURE, UNSPECIFIED: ICD-10-CM

## 2022-01-26 LAB
24R-OH-CALCIDIOL SERPL-MCNC: 30.9 NG/ML — SIGNIFICANT CHANGE UP (ref 30–80)
ANION GAP SERPL CALC-SCNC: 6 MMOL/L — SIGNIFICANT CHANGE UP (ref 5–17)
BUN SERPL-MCNC: 81 MG/DL — HIGH (ref 7–18)
CALCIUM SERPL-MCNC: 8.6 MG/DL — SIGNIFICANT CHANGE UP (ref 8.4–10.5)
CALCIUM SERPL-MCNC: 9.1 MG/DL — SIGNIFICANT CHANGE UP (ref 8.4–10.5)
CHLORIDE SERPL-SCNC: 119 MMOL/L — HIGH (ref 96–108)
CO2 SERPL-SCNC: 22 MMOL/L — SIGNIFICANT CHANGE UP (ref 22–31)
CREAT SERPL-MCNC: 3.23 MG/DL — HIGH (ref 0.5–1.3)
GLUCOSE BLDC GLUCOMTR-MCNC: 116 MG/DL — HIGH (ref 70–99)
GLUCOSE BLDC GLUCOMTR-MCNC: 81 MG/DL — SIGNIFICANT CHANGE UP (ref 70–99)
GLUCOSE BLDC GLUCOMTR-MCNC: 89 MG/DL — SIGNIFICANT CHANGE UP (ref 70–99)
GLUCOSE BLDC GLUCOMTR-MCNC: 96 MG/DL — SIGNIFICANT CHANGE UP (ref 70–99)
GLUCOSE SERPL-MCNC: 85 MG/DL — SIGNIFICANT CHANGE UP (ref 70–99)
HCT VFR BLD CALC: 27.1 % — LOW (ref 39–50)
HGB BLD-MCNC: 9.2 G/DL — LOW (ref 13–17)
MCHC RBC-ENTMCNC: 29.4 PG — SIGNIFICANT CHANGE UP (ref 27–34)
MCHC RBC-ENTMCNC: 33.9 GM/DL — SIGNIFICANT CHANGE UP (ref 32–36)
MCV RBC AUTO: 86.6 FL — SIGNIFICANT CHANGE UP (ref 80–100)
NRBC # BLD: 0 /100 WBCS — SIGNIFICANT CHANGE UP (ref 0–0)
PLATELET # BLD AUTO: 104 K/UL — LOW (ref 150–400)
POTASSIUM SERPL-MCNC: 4.8 MMOL/L — SIGNIFICANT CHANGE UP (ref 3.5–5.3)
POTASSIUM SERPL-SCNC: 4.8 MMOL/L — SIGNIFICANT CHANGE UP (ref 3.5–5.3)
PTH-INTACT FLD-MCNC: 54 PG/ML — SIGNIFICANT CHANGE UP (ref 15–65)
RBC # BLD: 3.13 M/UL — LOW (ref 4.2–5.8)
RBC # FLD: 17.9 % — HIGH (ref 10.3–14.5)
SODIUM SERPL-SCNC: 147 MMOL/L — HIGH (ref 135–145)
WBC # BLD: 19.05 K/UL — HIGH (ref 3.8–10.5)
WBC # FLD AUTO: 19.05 K/UL — HIGH (ref 3.8–10.5)

## 2022-01-26 RX ORDER — SODIUM CHLORIDE 9 MG/ML
1000 INJECTION, SOLUTION INTRAVENOUS
Refills: 0 | Status: DISCONTINUED | OUTPATIENT
Start: 2022-01-26 | End: 2022-01-27

## 2022-01-26 RX ADMIN — Medication 12.5 MILLIGRAM(S): at 06:15

## 2022-01-26 RX ADMIN — MIDODRINE HYDROCHLORIDE 5 MILLIGRAM(S): 2.5 TABLET ORAL at 06:14

## 2022-01-26 RX ADMIN — APIXABAN 2.5 MILLIGRAM(S): 2.5 TABLET, FILM COATED ORAL at 06:14

## 2022-01-26 RX ADMIN — SODIUM CHLORIDE 60 MILLILITER(S): 9 INJECTION, SOLUTION INTRAVENOUS at 17:08

## 2022-01-26 NOTE — PROGRESS NOTE ADULT - SUBJECTIVE AND OBJECTIVE BOX
76y Male is under our care for     REVIEW OF SYSTEMS:  [  ] Not able to elicit  General:	  Chest:	  GI:	  :  Skin:	  Musculoskeletal:	  Neuro:	    MEDS:    ALLERGIES: Allergies    No Known Allergies    Intolerances        VITALS:  Vital Signs Last 24 Hrs  T(C): 36.5 (26 Jan 2022 12:11), Max: 36.8 (25 Jan 2022 13:55)  T(F): 97.7 (26 Jan 2022 12:11), Max: 98.3 (25 Jan 2022 13:55)  HR: 80 (26 Jan 2022 12:11) (80 - 87)  BP: 145/74 (26 Jan 2022 12:11) (130/72 - 155/70)  BP(mean): --  RR: 18 (26 Jan 2022 12:11) (18 - 18)  SpO2: 100% (26 Jan 2022 12:11) (99% - 100%)      PHYSICAL EXAM:  HEENT:  Neck:  Respiratory:  Cardiovascular:  Gastrointestinal:  Extremities:  Skin:  Ortho:  Neuro:    LABS/DIAGNOSTIC TESTS:                        9.2    19.05 )-----------( 104      ( 26 Jan 2022 08:21 )             27.1     WBC Count: 19.05 K/uL (01-26 @ 08:21)  WBC Count: 22.09 K/uL (01-25 @ 07:50)  WBC Count: 15.01 K/uL (01-24 @ 05:16)  WBC Count: 8.64 K/uL (01-23 @ 05:29)  WBC Count: 6.20 K/uL (01-22 @ 03:24)    01-26    147<H>  |  119<H>  |  81<H>  ----------------------------<  85  4.8   |  22  |  3.23<H>    Ca    8.6      26 Jan 2022 08:21  Phos  3.2     01-25  Mg     2.1     01-25        CULTURES:   Catheterized Catheterized  01-19 @ 11:06   10,000 - 49,000 CFU/mL Yeast like cells "Susceptibilities not performed"  --  --      .Blood Blood-Peripheral  01-17 @ 17:49   No Growth Final  --  --      .Surgical Swab left foot wound  11-02 @ 13:15   Few Staphylococcus epidermidis  "Susceptibilities not performed"  --  --        RADIOLOGY:  no new studies 76y Male is under our care for leukocytosis.  Patient was seen laying comfortably in bed with no acute distress.  He remains afebrile and WBC count is downtrending.    REVIEW OF SYSTEMS:  [  ] Not able to elicit  General: no fevers no malaise  Chest: no cough no sob  GI: no nvd  : no urinary sxs   Skin: no rashes  Musculoskeletal: no trauma no LBP  Neuro: no ha's no dizziness     MEDS:    ALLERGIES: Allergies    No Known Allergies    Intolerances        VITALS:  Vital Signs Last 24 Hrs  T(C): 36.5 (26 Jan 2022 12:11), Max: 36.8 (25 Jan 2022 13:55)  T(F): 97.7 (26 Jan 2022 12:11), Max: 98.3 (25 Jan 2022 13:55)  HR: 80 (26 Jan 2022 12:11) (80 - 87)  BP: 145/74 (26 Jan 2022 12:11) (130/72 - 155/70)  BP(mean): --  RR: 18 (26 Jan 2022 12:11) (18 - 18)  SpO2: 100% (26 Jan 2022 12:11) (99% - 100%)      PHYSICAL EXAM:  HEENT: n/a  Neck: supple  Respiratory: lungs clear no rales  Cardiovascular: S1 S2 reg no murmurs  Gastrointestinal: +BS with soft, nondistended abdomen; nontender  : Molina catheter in place with mildly cloudy urine  Extremities: no edema  Skin: Bilateral feet necrotic ulcers  Ortho: n/a  Neuro: Awake and alert    LABS/DIAGNOSTIC TESTS:                        9.2    19.05 )-----------( 104      ( 26 Jan 2022 08:21 )             27.1     WBC Count: 19.05 K/uL (01-26 @ 08:21)  WBC Count: 22.09 K/uL (01-25 @ 07:50)  WBC Count: 15.01 K/uL (01-24 @ 05:16)  WBC Count: 8.64 K/uL (01-23 @ 05:29)  WBC Count: 6.20 K/uL (01-22 @ 03:24)    01-26    147<H>  |  119<H>  |  81<H>  ----------------------------<  85  4.8   |  22  |  3.23<H>    Ca    8.6      26 Jan 2022 08:21  Phos  3.2     01-25  Mg     2.1     01-25        CULTURES:   Catheterized Catheterized  01-19 @ 11:06   10,000 - 49,000 CFU/mL Yeast like cells "Susceptibilities not performed"  --  --      .Blood Blood-Peripheral  01-17 @ 17:49   No Growth Final  --  --      .Surgical Swab left foot wound  11-02 @ 13:15   Few Staphylococcus epidermidis  "Susceptibilities not performed"  --  --        RADIOLOGY:  no new studies

## 2022-01-26 NOTE — PROGRESS NOTE ADULT - SUBJECTIVE AND OBJECTIVE BOX
NEPHROLOGY MEDICAL CARE, [facility] - Dr. Tariq Herrera/ Dr. Lauren Lopez/ Dr. Dre Call/ Dr. Destiny Knott    Patient was seen and examined at bedside.    CC: patient is NAD    Vital Signs Last 24 Hrs  T(C): 36.7 (26 Jan 2022 13:57), Max: 36.7 (26 Jan 2022 13:57)  T(F): 98 (26 Jan 2022 13:57), Max: 98 (26 Jan 2022 13:57)  HR: 79 (26 Jan 2022 13:57) (79 - 87)  BP: 126/67 (26 Jan 2022 13:57) (126/67 - 155/70)  BP(mean): --  RR: 16 (26 Jan 2022 13:57) (16 - 18)  SpO2: 100% (26 Jan 2022 13:57) (99% - 100%)    01-25 @ 07:01  -  01-26 @ 07:00  --------------------------------------------------------  IN: 0 mL / OUT: 50 mL / NET: -50 mL        PHYSICAL EXAM:  General: No acute respiratory distress.  Eyes: conjunctiva and sclera clear  ENMT: Atraumatic, Normocephalic, supple, No JVD present. Moist mucous membranes  Respiratory: Bilateral poor air entry; No rales, rhonchi, wheezing  Cardiovassular: S1S2+; no m/r/g  Gastrointestinal: Soft, Non-tender, Nondistended; Bowel sounds present  : ibrahim's cath with brown colored urine  Neuro:  Awake but confused..   Ext: trace edema  Skin: No visible rashes      MEDICATIONS:  MEDICATIONS  (STANDING):  apixaban 2.5 milliGRAM(s) Oral two times a day  aspirin  chewable 81 milliGRAM(s) Oral daily  atorvastatin 40 milliGRAM(s) Oral at bedtime  finasteride 5 milliGRAM(s) Oral daily  influenza  Vaccine (HIGH DOSE) 0.7 milliLiter(s) IntraMuscular once  insulin lispro (ADMELOG) corrective regimen sliding scale   SubCutaneous Before meals and at bedtime  metoprolol tartrate 12.5 milliGRAM(s) Oral every 12 hours  mirtazapine 7.5 milliGRAM(s) Oral daily  pantoprazole    Tablet 40 milliGRAM(s) Oral before breakfast  tamsulosin 0.4 milliGRAM(s) Oral at bedtime    MEDICATIONS  (PRN):  acetaminophen     Tablet .. 650 milliGRAM(s) Oral every 6 hours PRN Temp greater or equal to 38C (100.4F), Mild Pain (1 - 3)  ondansetron Injectable 4 milliGRAM(s) IV Push every 8 hours PRN Nausea and/or Vomiting          LABS:                        9.2    19.05 )-----------( 104      ( 26 Jan 2022 08:21 )             27.1     01-26    147<H>  |  119<H>  |  81<H>  ----------------------------<  85  4.8   |  22  |  3.23<H>    Ca    8.6      26 Jan 2022 08:21  Phos  3.2     01-25  Mg     2.1     01-25            Urine studies    PTH and Vit D:

## 2022-01-26 NOTE — PROGRESS NOTE ADULT - PROBLEM SELECTOR PLAN 1
around 4.2, improving  Patient has normal creatinine at baseline, 1.18 Nov 2021  Presented with elevated BUN/cr of 51/4.45  Most likely ATN from hypoperfusion   S/p 6L bolus, No more fluids for now  Will monitor UO and BMP  Nephro on board, Dr. Herrera

## 2022-01-26 NOTE — PROGRESS NOTE ADULT - ASSESSMENT
Septic Shock - resolved  Leukocytosis - improving    Plan - Completed course of Diflican  will monitor WBC count for now, no antibiotics as yet.                 Septic Shock - resolved  Leukocytosis - improving    Plan - Completed course of Diflican  will monitor WBC count for now, no antibiotics as yet.    I agree with above

## 2022-01-26 NOTE — PROGRESS NOTE ADULT - ASSESSMENT
1. OREN due to ATN.  -scr is improving with renal recovery and stable electrolytes.   -urinalysis shows granular casts. urine lytes not done.   -Adjust meds to eGFR and avoid IV Gadolinium contrast,NSAIDs, and phosphate enema.  -Monitor I/O's daily.   -Monitor SMA daily.  2. CKD stage 3 most likely due to diabetic nephropathy  -Baseline Scr around 1.2mg/dL  -Keep patient euvolemic and renal diet  -Avoid Nephrotoxic Meds/ Agents such as (NSAIDs, IV contrast, Aminoglycosides such as gentamicin, -Gadolinium contrast, Phosphate containing enemas, etc..)  -Adjust Medications according to eGFR  3. Anemia: hb is stable. monitor CBC  4. Hypotension: bp is stable.   5. MBD: phos is okay.   6. Hyponatremia due to lack of free water intake.  -start D5W at 60cc/hr. monitor Na.

## 2022-01-26 NOTE — PROGRESS NOTE ADULT - SUBJECTIVE AND OBJECTIVE BOX
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 Patient received 3L bolus in ED, s/p BP was still low 70/30s. Patient admitted to ICU for septic shock requiring pressors.       INTERVAL HPI/OVERNIGHT EVENTS:   No overnight events noted, no new complaints    REVIEW OF SYSTEMS:  CONSTITUTIONAL: No fever, weight loss, or fatigue  RESPIRATORY: No cough, wheezing, chills or hemoptysis; No shortness of breath  CARDIOVASCULAR: No chest pain, palpitations, dizziness, or leg swelling  GASTROINTESTINAL: No abdominal pain. No nausea, vomiting, or hematemesis; No diarrhea or constipation. No melena or hematochezia.  GENITOURINARY: No dysuria or hematuria, urinary frequency  NEUROLOGICAL: No headaches, memory loss, loss of strength, numbness, or tremors  SKIN: No itching, burning, rashes, or lesions     MEDICATIONS  (STANDING):  apixaban 2.5 milliGRAM(s) Oral two times a day  aspirin  chewable 81 milliGRAM(s) Oral daily  atorvastatin 40 milliGRAM(s) Oral at bedtime  dextrose 5%. 1000 milliLiter(s) (60 mL/Hr) IV Continuous <Continuous>  finasteride 5 milliGRAM(s) Oral daily  influenza  Vaccine (HIGH DOSE) 0.7 milliLiter(s) IntraMuscular once  insulin lispro (ADMELOG) corrective regimen sliding scale   SubCutaneous Before meals and at bedtime  metoprolol tartrate 12.5 milliGRAM(s) Oral every 12 hours  mirtazapine 7.5 milliGRAM(s) Oral daily  pantoprazole    Tablet 40 milliGRAM(s) Oral before breakfast  tamsulosin 0.4 milliGRAM(s) Oral at bedtime    MEDICATIONS  (PRN):  acetaminophen     Tablet .. 650 milliGRAM(s) Oral every 6 hours PRN Temp greater or equal to 38C (100.4F), Mild Pain (1 - 3)  ondansetron Injectable 4 milliGRAM(s) IV Push every 8 hours PRN Nausea and/or Vomiting      Vital Signs Last 24 Hrs  T(C): 36.7 (26 Jan 2022 13:57), Max: 36.7 (26 Jan 2022 13:57)  T(F): 98 (26 Jan 2022 13:57), Max: 98 (26 Jan 2022 13:57)  HR: 79 (26 Jan 2022 13:57) (79 - 87)  BP: 126/67 (26 Jan 2022 13:57) (126/67 - 155/70)  BP(mean): --  RR: 16 (26 Jan 2022 13:57) (16 - 18)  SpO2: 100% (26 Jan 2022 13:57) (99% - 100%)    PHYSICAL EXAMINATION:  General: No acute distress  HEENT: EOMI, PERRL  Neck: Supple, [ ] JVD  Lungs: decreased breath sounds   Heart: Regular rate and rhythm  Abdomen: Nontender, bowel sounds present  Extremities: No clubbing, cyanosis, edema  Nervous system:  Alert & Oriented X3, no focal deficits  Psychiatric: Normal affect  Skin: No rashes or lesions                            9.2    19.05 )-----------( 104      ( 26 Jan 2022 08:21 )             27.1     01-26    147<H>  |  119<H>  |  81<H>  ----------------------------<  85  4.8   |  22  |  3.23<H>    Ca    8.6      26 Jan 2022 08:21  Phos  3.2     01-25  Mg     2.1     01-25                I&O's Summary    25 Jan 2022 07:01  -  26 Jan 2022 07:00  --------------------------------------------------------  IN: 0 mL / OUT: 50 mL / NET: -50 mL            CAPILLARY BLOOD GLUCOSE      RADIOLOGY & ADDITIONAL TESTS:

## 2022-01-26 NOTE — PROGRESS NOTE ADULT - SUBJECTIVE AND OBJECTIVE BOX
HPI:  75 yo male from [facility] assisted living with medical history of DM on insulin, HTN, HLD, CAD S/p CABG, 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 Patient is alert and oriented on encounter. Patient states that he cut his foot and his back was hurting. Patient is unable to provide much history due to discomfort. Patient has stage 3 ulcers on sacrum, penile ulcer. Also has chronic wounds on foot. He was found to be hypotensive on field 60/30. He was given 3L bolus in ED after which BP was still low 70/30. Patient denies fever. As per AL records no h/o abdominal pain, nausea, vomiting, diarrhea, cough.  (17 Jan 2022 15:23)      ICU Vital Signs Last 24 Hrs  T(C): 36.7 (26 Jan 2022 13:57), Max: 36.7 (26 Jan 2022 13:57)  T(F): 98 (26 Jan 2022 13:57), Max: 98 (26 Jan 2022 13:57)  HR: 79 (26 Jan 2022 13:57) (79 - 87)  BP: 126/67 (26 Jan 2022 13:57) (126/67 - 155/70)  BP(mean): --  ABP: --  ABP(mean): --  RR: 16 (26 Jan 2022 13:57) (16 - 18)  SpO2: 100% (26 Jan 2022 13:57) (99% - 100%)      Vital Signs Last 24 Hrs  T(C): 36.7 (26 Jan 2022 13:57), Max: 36.7 (26 Jan 2022 13:57)  T(F): 98 (26 Jan 2022 13:57), Max: 98 (26 Jan 2022 13:57)  HR: 79 (26 Jan 2022 13:57) (79 - 87)  BP: 126/67 (26 Jan 2022 13:57) (126/67 - 155/70)  BP(mean): --  RR: 16 (26 Jan 2022 13:57) (16 - 18)  SpO2: 100% (26 Jan 2022 13:57) (99% - 100%)                        9.2    19.05 )-----------( 104      ( 26 Jan 2022 08:21 )             27.1     01-26    147<H>  |  119<H>  |  81<H>  ----------------------------<  85  4.8   |  22  |  3.23<H>    Ca    8.6      26 Jan 2022 08:21  Phos  3.2     01-25  Mg     2.1     01-25          CAPILLARY BLOOD GLUCOSE      POCT Blood Glucose.: 89 mg/dL (26 Jan 2022 16:33)  POCT Blood Glucose.: 96 mg/dL (26 Jan 2022 11:19)  POCT Blood Glucose.: 81 mg/dL (26 Jan 2022 07:34)  POCT Blood Glucose.: 89 mg/dL (25 Jan 2022 20:43)      MEDICATIONS  (STANDING):  apixaban 2.5 milliGRAM(s) Oral two times a day  aspirin  chewable 81 milliGRAM(s) Oral daily  atorvastatin 40 milliGRAM(s) Oral at bedtime  dextrose 5%. 1000 milliLiter(s) (60 mL/Hr) IV Continuous <Continuous>  finasteride 5 milliGRAM(s) Oral daily  influenza  Vaccine (HIGH DOSE) 0.7 milliLiter(s) IntraMuscular once  insulin lispro (ADMELOG) corrective regimen sliding scale   SubCutaneous Before meals and at bedtime  metoprolol tartrate 12.5 milliGRAM(s) Oral every 12 hours  mirtazapine 7.5 milliGRAM(s) Oral daily  pantoprazole    Tablet 40 milliGRAM(s) Oral before breakfast  tamsulosin 0.4 milliGRAM(s) Oral at bedtime    MEDICATIONS  (PRN):  acetaminophen     Tablet .. 650 milliGRAM(s) Oral every 6 hours PRN Temp greater or equal to 38C (100.4F), Mild Pain (1 - 3)  ondansetron Injectable 4 milliGRAM(s) IV Push every 8 hours PRN Nausea and/or Vomiting    Allergies    No Known Allergies    Intolerances      PAST MEDICAL & SURGICAL HISTORY:  HTN (hypertension)    HLD (hyperlipidemia)    DM (diabetes mellitus)    CAD (coronary artery disease)    Glaucoma, angle-closure    [place] (hard of hearing)    S/P CABG (coronary artery bypass graft)    History of thyroid surgery      Social History:     HPI:  77 yo male from [facility] assisted living with medical history of DM on insulin, HTN, HLD, CAD S/p CABG, 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 Patient is alert and oriented on encounter. Patient states that he cut his foot and his back was hurting. Patient is unable to provide much history due to discomfort. Patient has stage 3 ulcers on sacrum, penile ulcer. Also has chronic wounds on foot. He was found to be hypotensive on field 60/30. He was given 3L bolus in ED after which BP was still low 70/30. Patient denies fever. As per AL records no h/o abdominal pain, nausea, vomiting, diarrhea, cough.  (17 Jan 2022 15:23)    Vital Signs Last 24 Hrs  T(C): 36.7 (26 Jan 2022 13:57), Max: 36.7 (26 Jan 2022 13:57)  T(F): 98 (26 Jan 2022 13:57), Max: 98 (26 Jan 2022 13:57)  HR: 79 (26 Jan 2022 13:57) (79 - 87)  BP: 126/67 (26 Jan 2022 13:57) (126/67 - 155/70)  BP(mean): --  RR: 16 (26 Jan 2022 13:57) (16 - 18)  SpO2: 100% (26 Jan 2022 13:57) (99% - 100%)                        9.2    19.05 )-----------( 104      ( 26 Jan 2022 08:21 )             27.1     01-26    147<H>  |  119<H>  |  81<H>  ----------------------------<  85  4.8   |  22  |  3.23<H>    Ca    8.6      26 Jan 2022 08:21  Phos  3.2     01-25  Mg     2.1     01-25          CAPILLARY BLOOD GLUCOSE      POCT Blood Glucose.: 89 mg/dL (26 Jan 2022 16:33)  POCT Blood Glucose.: 96 mg/dL (26 Jan 2022 11:19)  POCT Blood Glucose.: 81 mg/dL (26 Jan 2022 07:34)  POCT Blood Glucose.: 89 mg/dL (25 Jan 2022 20:43)      MEDICATIONS  (STANDING):  apixaban 2.5 milliGRAM(s) Oral two times a day  aspirin  chewable 81 milliGRAM(s) Oral daily  atorvastatin 40 milliGRAM(s) Oral at bedtime  dextrose 5%. 1000 milliLiter(s) (60 mL/Hr) IV Continuous <Continuous>  finasteride 5 milliGRAM(s) Oral daily  influenza  Vaccine (HIGH DOSE) 0.7 milliLiter(s) IntraMuscular once  insulin lispro (ADMELOG) corrective regimen sliding scale   SubCutaneous Before meals and at bedtime  metoprolol tartrate 12.5 milliGRAM(s) Oral every 12 hours  mirtazapine 7.5 milliGRAM(s) Oral daily  pantoprazole    Tablet 40 milliGRAM(s) Oral before breakfast  tamsulosin 0.4 milliGRAM(s) Oral at bedtime    MEDICATIONS  (PRN):  acetaminophen     Tablet .. 650 milliGRAM(s) Oral every 6 hours PRN Temp greater or equal to 38C (100.4F), Mild Pain (1 - 3)  ondansetron Injectable 4 milliGRAM(s) IV Push every 8 hours PRN Nausea and/or Vomiting    Allergies    No Known Allergies    Intolerances      PAST MEDICAL & SURGICAL HISTORY:  HTN (hypertension)    HLD (hyperlipidemia)    DM (diabetes mellitus)    CAD (coronary artery disease)    Glaucoma, angle-closure    [place] (hard of hearing)    S/P CABG (coronary artery bypass graft)    History of thyroid surgery      Social History:  pt seen at bedside   more stable   vasc:   DP -fainted   TG-increased   CRF: 2 sec   neuro:   N/A  Derm:   multiple lesions mostly right foot   covered with dry dark skin   no drainage   b/l heel   DTI  NO PEDAL hair growth     skin is shiny   dry   discolorations ++  MS;  N/A  multiple bony prominences

## 2022-01-26 NOTE — PROGRESS NOTE ADULT - ASSESSMENT
a/p  DM with circulatory complications   multiple skin lesions   diabetic ulcers /DTI  pt seen and eval   right foot lesions debrided with #15 blade to remove all necrotic /fibrotic tissue  to and including SQ level to pt tolerance   bacitracin applied   DSD   continue   offloading   xary when possible   antibiotics as per id   will re-eval in a few days   thank you for  this referral   please call with any pt related questions [phone]

## 2022-01-26 NOTE — PROGRESS NOTE ADULT - ASSESSMENT
75 yo male from [facility] assisted living with medical history of DM on insulin, HTN, HLD, CAD S/p CABG, 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 Patient received 3L bolus in ED, s/p BP was still low 70/30s. Patient admitted to ICU for septic shock requiring pressors.     #Septic shock 2/2 ulcers vs UTI  #Delirium  #Acute renal failure  #Penile ulcer  #Sacral ulcer  #Diabetic foot ulcer  #Elevated INR  #Elevated LFTs    -NEURO  Patient AAOx 3  Lethargy was likely delirium due to infection  CTH showed  Moderate periventricular and deep white matter ischemia. Encephalomalacia and gliosis in RIGHT occipital lobe. Global atrophy. 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    -PULMONARY  No active issues at present    -CVS  #Septic shock likely 2/2 ulcers vs UTI-- Resolved   Patient presented with hypotension  Was given 3 L bolus in ED, BP was still low  S/p 6L of IVF total   Off Levophed, off Vasopressin   C/w midodrine 5 mg q8hrs   Off Solu cortef   Completed Zosyn, Off Linezolid since 1/21/22  C/w Diflucan 100 mg qd for 5 days   S/p IV albumin x 4 doses   UA +ve and Ucx yeast like   ID Dr Randhawa on board     #PAF  - Patient has hx of PAF on Eliquis 5 mg BID at home  - C/w Eliquis 2.5 mg BID for now while ATN resolves     -GI  Has elevated INR and AST  Likely 2/2 septic shock  Will monitor CMP  Diet Puree renal diet    -RENAL  #Acute renal failure- around 4.2  Patient has normal creatinine at baseline, 1.18 Nov 2021  Presented with elevated BUN/cr of 51/4.45  Most likely ATN from hypoperfusion   S/p 6L bolus, No more fluids for now  Will monitor UO and BMP  Nephro on board     -ID  Septic shock 2/2 ulcers vs UTI  Patient has septic shock 2/2 SSTI  Completed Zosyn, Off Linezolid since 1/21/22  C/w Diflucan 100 mg qd for 5 days   UA +ve and Ucx yeast like   ID Dr Randhawa on board    -ENDO  DM  Patient has h/o IDDM, on 10 units of Lantus at bedtime  Diet Puree renal diet   Monitor ACHS, FS before meals and at bedtime   HSS  C/w Remeron for appetite     -SKIN  Patient has stage 3/4 sacral ulcer, penile ulcer, foot ulcer  Wound care on board  Completed Zosyn, Off Linezolid since 1/21/22  C/w Diflucan 100 mg qd for 5 days   Dr Garcia on board   ID Dr Randhawa on board   on board    -Prophylactic measure  C/w Eliquis dose adjusted to 2.5 BID  C/w ppi    -GOC   Full Code

## 2022-01-27 DIAGNOSIS — Z51.5 ENCOUNTER FOR PALLIATIVE CARE: ICD-10-CM

## 2022-01-27 DIAGNOSIS — E43 UNSPECIFIED SEVERE PROTEIN-CALORIE MALNUTRITION: ICD-10-CM

## 2022-01-27 DIAGNOSIS — F03.90 UNSPECIFIED DEMENTIA WITHOUT BEHAVIORAL DISTURBANCE: ICD-10-CM

## 2022-01-27 LAB
ANION GAP SERPL CALC-SCNC: 8 MMOL/L — SIGNIFICANT CHANGE UP (ref 5–17)
ANION GAP SERPL CALC-SCNC: 9 MMOL/L — SIGNIFICANT CHANGE UP (ref 5–17)
BUN SERPL-MCNC: 71 MG/DL — HIGH (ref 7–18)
BUN SERPL-MCNC: 72 MG/DL — HIGH (ref 7–18)
CALCIUM SERPL-MCNC: 8.2 MG/DL — LOW (ref 8.4–10.5)
CALCIUM SERPL-MCNC: 8.8 MG/DL — SIGNIFICANT CHANGE UP (ref 8.4–10.5)
CHLORIDE SERPL-SCNC: 118 MMOL/L — HIGH (ref 96–108)
CHLORIDE SERPL-SCNC: 120 MMOL/L — HIGH (ref 96–108)
CO2 SERPL-SCNC: 21 MMOL/L — LOW (ref 22–31)
CO2 SERPL-SCNC: 22 MMOL/L — SIGNIFICANT CHANGE UP (ref 22–31)
CREAT SERPL-MCNC: 2.35 MG/DL — HIGH (ref 0.5–1.3)
CREAT SERPL-MCNC: 2.62 MG/DL — HIGH (ref 0.5–1.3)
GLUCOSE BLDC GLUCOMTR-MCNC: 136 MG/DL — HIGH (ref 70–99)
GLUCOSE BLDC GLUCOMTR-MCNC: 141 MG/DL — HIGH (ref 70–99)
GLUCOSE BLDC GLUCOMTR-MCNC: 147 MG/DL — HIGH (ref 70–99)
GLUCOSE BLDC GLUCOMTR-MCNC: 165 MG/DL — HIGH (ref 70–99)
GLUCOSE SERPL-MCNC: 127 MG/DL — HIGH (ref 70–99)
GLUCOSE SERPL-MCNC: 142 MG/DL — HIGH (ref 70–99)
HCT VFR BLD CALC: 26.4 % — LOW (ref 39–50)
HGB BLD-MCNC: 8.6 G/DL — LOW (ref 13–17)
MCHC RBC-ENTMCNC: 28.3 PG — SIGNIFICANT CHANGE UP (ref 27–34)
MCHC RBC-ENTMCNC: 32.6 GM/DL — SIGNIFICANT CHANGE UP (ref 32–36)
MCV RBC AUTO: 86.8 FL — SIGNIFICANT CHANGE UP (ref 80–100)
NRBC # BLD: 0 /100 WBCS — SIGNIFICANT CHANGE UP (ref 0–0)
PLATELET # BLD AUTO: 84 K/UL — LOW (ref 150–400)
POTASSIUM SERPL-MCNC: 3.5 MMOL/L — SIGNIFICANT CHANGE UP (ref 3.5–5.3)
POTASSIUM SERPL-MCNC: 3.6 MMOL/L — SIGNIFICANT CHANGE UP (ref 3.5–5.3)
POTASSIUM SERPL-SCNC: 3.5 MMOL/L — SIGNIFICANT CHANGE UP (ref 3.5–5.3)
POTASSIUM SERPL-SCNC: 3.6 MMOL/L — SIGNIFICANT CHANGE UP (ref 3.5–5.3)
RBC # BLD: 3.04 M/UL — LOW (ref 4.2–5.8)
RBC # FLD: 17.5 % — HIGH (ref 10.3–14.5)
SODIUM SERPL-SCNC: 147 MMOL/L — HIGH (ref 135–145)
SODIUM SERPL-SCNC: 151 MMOL/L — HIGH (ref 135–145)
WBC # BLD: 19.99 K/UL — HIGH (ref 3.8–10.5)
WBC # FLD AUTO: 19.99 K/UL — HIGH (ref 3.8–10.5)

## 2022-01-27 PROCEDURE — 99223 1ST HOSP IP/OBS HIGH 75: CPT

## 2022-01-27 PROCEDURE — 99497 ADVNCD CARE PLAN 30 MIN: CPT | Mod: 25

## 2022-01-27 RX ORDER — SODIUM CHLORIDE 9 MG/ML
1000 INJECTION, SOLUTION INTRAVENOUS
Refills: 0 | Status: DISCONTINUED | OUTPATIENT
Start: 2022-01-27 | End: 2022-02-05

## 2022-01-27 RX ADMIN — SODIUM CHLORIDE 60 MILLILITER(S): 9 INJECTION, SOLUTION INTRAVENOUS at 06:44

## 2022-01-27 RX ADMIN — Medication 2: at 12:01

## 2022-01-27 NOTE — CONSULT NOTE ADULT - ATTENDING COMMENTS
76 y M with multiple comorbidities including vascular dementia, LE ulcers, Stage IV sacral decub, s/p R toe amputation 8/21, at [facility] prior to admission, adm for septic shock 2/2 ulcers vs UTI, since downgraded to medical floor. usu A&Ox3, currently lethargic, cachectic, answers simple questions, bedbound, NAD. Did not cooperate w SLP eval. NPO. High risk of ongoing complications, further decline. Hospice eligible. Daughter wishes to discuss w brother regarding GOC. Palliative will follow.

## 2022-01-27 NOTE — CONSULT NOTE ADULT - CONVERSATION DETAILS
Spoke with the pt's daughter Arabella Oliva, discussed his current clinical condition and goals of care. Arabella expressed being overwhelmed her mother is at [facility] s/p CVA and debulking for ovarian ca, and her aunt is also admitted and is not doing well.  She verbalized understanding of the pt's overall declining health.    Discussed hospice philosophy, explained the pt can go back to [facility] with hospice.    Regarding goals of care, discussed the risks vs benefits of cardiopulmonary resuscitation, intubation, and artificial nutrition in context of advanced illness.  These invasive measures would not optimize the pt's life but rather cause undue stress with no meaningful recovery.   She stated she will have to speak with her brother before making nay decision about treatment options.    Pt remains a FULL CODE.  Palliative care will follow.    All questions answered.  Support provided.    Plan discussed with the primary team. Due to the patient's health status and restrictions on visitation during the Public Health Emergency, the Advance Care Planning service was performed via telephone with patient's ____daughter_____.   Spoke with the pt's daughter Arabella Oliva, discussed his current clinical condition and goals of care. Arabella expressed being overwhelmed her mother is at [facility] s/p CVA and debulking for ovarian ca, and her aunt is also admitted and is not doing well.  She verbalized understanding of the pt's overall declining health.    Discussed hospice philosophy, explained the pt can go back to [facility] with hospice.    Regarding goals of care, discussed the risks vs benefits of cardiopulmonary resuscitation, intubation, and artificial nutrition in context of advanced illness.  These invasive measures would not optimize the pt's life but rather cause undue stress with no meaningful recovery.   She stated she will have to speak with her brother before making nay decision about treatment options.    Pt remains a FULL CODE.  Palliative care will follow.    All questions answered.  Support provided.    Plan discussed with the primary team.

## 2022-01-27 NOTE — PROGRESS NOTE ADULT - SUBJECTIVE AND OBJECTIVE BOX
NEPHROLOGY MEDICAL CARE, [facility] - Dr. Tariq Herrera/ Dr. Lauren Lopez/ Dr. Dre Call/ Dr. Destiny Knott    Patient was seen and examined at bedside.    CC: patient not eating much as per nurse.    Vital Signs Last 24 Hrs  T(C): 36.4 (27 Jan 2022 04:53), Max: 36.4 (26 Jan 2022 21:01)  T(F): 97.6 (27 Jan 2022 04:53), Max: 97.6 (26 Jan 2022 21:01)  HR: 93 (27 Jan 2022 04:53) (84 - 93)  BP: 120/66 (27 Jan 2022 04:53) (120/66 - 128/74)  BP(mean): --  RR: 18 (27 Jan 2022 04:53) (18 - 18)  SpO2: 99% (27 Jan 2022 04:53) (99% - 100%)    01-26 @ 07:01  -  01-27 @ 07:00  --------------------------------------------------------  IN: 0 mL / OUT: 1000 mL / NET: -1000 mL        PHYSICAL EXAM:  General: No acute respiratory distress.  Eyes: conjunctiva and sclera clear  ENMT: Atraumatic, Normocephalic, supple, No JVD present. Moist mucous membranes  Respiratory: Bilateral poor air entry; No rales, rhonchi, wheezing  Cardiovassular: S1S2+; no m/r/g  Gastrointestinal: Soft, Non-tender, Nondistended; Bowel sounds present  : ibrahim's cath with brown colored urine  Neuro:  Awake but confused..   Ext: trace edema  Skin: No visible rashes        MEDICATIONS:  MEDICATIONS  (STANDING):  apixaban 2.5 milliGRAM(s) Oral two times a day  aspirin  chewable 81 milliGRAM(s) Oral daily  atorvastatin 40 milliGRAM(s) Oral at bedtime  dextrose 5%. 1000 milliLiter(s) (75 mL/Hr) IV Continuous <Continuous>  finasteride 5 milliGRAM(s) Oral daily  influenza  Vaccine (HIGH DOSE) 0.7 milliLiter(s) IntraMuscular once  insulin lispro (ADMELOG) corrective regimen sliding scale   SubCutaneous Before meals and at bedtime  metoprolol tartrate 12.5 milliGRAM(s) Oral every 12 hours  mirtazapine 7.5 milliGRAM(s) Oral daily  pantoprazole    Tablet 40 milliGRAM(s) Oral before breakfast  tamsulosin 0.4 milliGRAM(s) Oral at bedtime    MEDICATIONS  (PRN):  acetaminophen     Tablet .. 650 milliGRAM(s) Oral every 6 hours PRN Temp greater or equal to 38C (100.4F), Mild Pain (1 - 3)  ondansetron Injectable 4 milliGRAM(s) IV Push every 8 hours PRN Nausea and/or Vomiting          LABS:                        8.6    19.99 )-----------( 84       ( 27 Jan 2022 05:55 )             26.4     01-27    151<H>  |  120<H>  |  72<H>  ----------------------------<  142<H>  3.6   |  22  |  2.62<H>    Ca    8.8      27 Jan 2022 05:55          Vitamin D, 25-Hydroxy: 30.9 ng/mL (01-26 @ 15:33)  Intact PTH: 54 pg/mL (01-26 @ 15:33)    Urine studies    PTH and Vit D:  Vitamin D, 25-Hydroxy: 30.9 ng/mL (01-26 @ 15:33)  Intact PTH: 54 pg/mL (01-26 @ 15:33)

## 2022-01-27 NOTE — CONSULT NOTE ADULT - SUBJECTIVE AND OBJECTIVE BOX
[facility] and Palliative Consult Service:  Dr. Monet March: cell ([phone])  Dr. Mena Myrick: cell ([phone])   Kayce Carnes NP: cell ([phone])  Fiordaliza Rowley NP: cell ([phone])   Matheus Fergusonradha LSW: cell ([phone])     HPI:  75 yo male from [facility] assisted living with medical history of DM on insulin, HTN, HLD, CAD S/p CABG, 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 Patient is alert and oriented on encounter. Patient states that he cut his foot and his back was hurting. Patient is unable to provide much history due to discomfort. Patient has stage 3 ulcers on sacrum, penile ulcer. Also has chronic wounds on foot. He was found to be hypotensive on field 60/30. He was given 3L bolus in ED after which BP was still low 70/30. Patient denies fever. As per AL records no h/o abdominal pain, nausea, vomiting, diarrhea, cough.      Interval hx;  PT AOx2 lethargic. Short stay in ICU due septic shock likely 2/2 ulcers vs UTI on pressors.  Downgraded to medicine floor.        PAST MEDICAL & SURGICAL HISTORY:  HTN (hypertension)    HLD (hyperlipidemia)    DM (diabetes mellitus)    CAD (coronary artery disease)    Glaucoma, angle-closure    [place] (hard of hearing)    S/P CABG (coronary artery bypass graft)    History of thyroid surgery        SOCIAL HISTORY:    Admitted from:  [facility]   Pt is , has 2 children daughter Arabella Oliva is his HCP  Pt's wife is also at [facility]    Rastafarian:   Hinduism                                 Preferred Language: Jordanian    Arabellarei Oliva  ([facility] [place])   Phone#  [phone]    FAMILY HISTORY:  Family history of acute myocardial infarction (Father)    Family history of diabetes mellitus (Mother, Sibling)    Family history of hypertension (Mother, Sibling)    Family history of hyperlipidemia (Mother, Sibling)     unable to obtain from patient due to poor mentation  Baseline ADLs (prior to admission): bedbound    Allergies    No Known Allergies    Intolerances      Present Symptoms:   Pain: denies   Unable to obtain full ROS due to poor mentation    MEDICATIONS  (STANDING):  apixaban 2.5 milliGRAM(s) Oral two times a day  aspirin  chewable 81 milliGRAM(s) Oral daily  atorvastatin 40 milliGRAM(s) Oral at bedtime  dextrose 5%. 1000 milliLiter(s) (75 mL/Hr) IV Continuous <Continuous>  finasteride 5 milliGRAM(s) Oral daily  influenza  Vaccine (HIGH DOSE) 0.7 milliLiter(s) IntraMuscular once  insulin lispro (ADMELOG) corrective regimen sliding scale   SubCutaneous Before meals and at bedtime  metoprolol tartrate 12.5 milliGRAM(s) Oral every 12 hours  mirtazapine 7.5 milliGRAM(s) Oral daily  pantoprazole    Tablet 40 milliGRAM(s) Oral before breakfast  tamsulosin 0.4 milliGRAM(s) Oral at bedtime    MEDICATIONS  (PRN):  acetaminophen     Tablet .. 650 milliGRAM(s) Oral every 6 hours PRN Temp greater or equal to 38C (100.4F), Mild Pain (1 - 3)  ondansetron Injectable 4 milliGRAM(s) IV Push every 8 hours PRN Nausea and/or Vomiting      PHYSICAL EXAM:  Vital Signs Last 24 Hrs  T(C): 36.4 (27 Jan 2022 04:53), Max: 36.7 (26 Jan 2022 13:57)  T(F): 97.6 (27 Jan 2022 04:53), Max: 98 (26 Jan 2022 13:57)  HR: 93 (27 Jan 2022 04:53) (79 - 93)  BP: 120/66 (27 Jan 2022 04:53) (120/66 - 128/74)  BP(mean): --  RR: 18 (27 Jan 2022 04:53) (16 - 18)  SpO2: 99% (27 Jan 2022 04:53) (99% - 100%)    General: cachetic elderly man, AOX2.  Lethargic.  NAD    Palliative Performance Scale/Karnofsky Score: 40%  ECOG Performance: n/a    HEENT: temporal wasting , poor dentition, neck supple  Lungs: unlabored on RA  CV: RRR, S1S2  GI: soft non distended non tender  incontinent  : incontinent    Musculoskeletal: bedbound,  chronic wounds on foot.  Skin: stage 3 ulcers on sacrum, penile ulcer  Neuro: unable to follow commands  Oral intake ability: unable/only mouth care    LABS:                        8.6    19.99 )-----------( 84       ( 27 Jan 2022 05:55 )             26.4     01-27    151<H>  |  120<H>  |  72<H>  ----------------------------<  142<H>  3.6   |  22  |  2.62<H>    Ca    8.8      27 Jan 2022 05:55      < from: CT Head No Cont (01.17.22 @ 12:20) >    ACC: [id number] EXAM:  CT BRAIN                          PROCEDURE DATE:  01/17/2022          INTERPRETATION:  CT head without IV contrast    CLINICAL INFORMATION: Altered mental status    TECHNIQUE: Contiguous axial 4 mm sections were obtained through the head.    This scan was performed using automatic exposure control (radiation dose   reduction software) to obtain a diagnostic image quality scan with   patient dose as low as reasonably achievable.    FINDINGS:   CT dated 12/09/2019 available for review.    The brain demonstrates moderate periventricular and deep white matter   ischemia. Encephalomalacia and gliosis in RIGHT occipital lobe. Old   infarction in the RIGHT cerebellum. There is no evidence of infarction.     No intracranial hemorrhage is found.  No mass effect is found in the   brain.    The ventricles, sulci and basal cisterns shows global atrophy.    The orbits are unremarkable.  The paranasal sinuses demonstrate age   appropriate development and are clear.  The nasal cavity appears intact.    The nasopharynx is prominent but symmetric, not atypcial for age.  The   central skull base, petrous temporal bones and calvarium remain intact.      IMPRESSION:   Moderate periventricular and deep white matter ischemia.   Encephalomalacia and gliosis in RIGHT occipital lobe. Global atrophy. Old   infarction in the RIGHT cerebellum.    < end of copied text >      RADIOLOGY & ADDITIONAL STUDIES: reviewed  ADVANCED DIRECTIVES:  FULL CODE

## 2022-01-27 NOTE — PROGRESS NOTE ADULT - SUBJECTIVE AND OBJECTIVE BOX
76y Male is under our care for     REVIEW OF SYSTEMS:  [  ] Not able to elicit  General:	  Chest:	  GI:	  :  Skin:	  Musculoskeletal:	  Neuro:	    MEDS:    ALLERGIES: Allergies    No Known Allergies    Intolerances        VITALS:  Vital Signs Last 24 Hrs  T(C): 36.4 (27 Jan 2022 04:53), Max: 36.7 (26 Jan 2022 13:57)  T(F): 97.6 (27 Jan 2022 04:53), Max: 98 (26 Jan 2022 13:57)  HR: 93 (27 Jan 2022 04:53) (79 - 93)  BP: 120/66 (27 Jan 2022 04:53) (120/66 - 145/74)  BP(mean): --  RR: 18 (27 Jan 2022 04:53) (16 - 18)  SpO2: 99% (27 Jan 2022 04:53) (99% - 100%)      PHYSICAL EXAM:  HEENT:  Neck:  Respiratory:  Cardiovascular:  Gastrointestinal:  Extremities:  Skin:  Ortho:  Neuro:    LABS/DIAGNOSTIC TESTS:                        8.6    19.99 )-----------( 84       ( 27 Jan 2022 05:55 )             26.4     WBC Count: 19.99 K/uL (01-27 @ 05:55)  WBC Count: 19.05 K/uL (01-26 @ 08:21)  WBC Count: 22.09 K/uL (01-25 @ 07:50)  WBC Count: 15.01 K/uL (01-24 @ 05:16)  WBC Count: 8.64 K/uL (01-23 @ 05:29)    01-27    151<H>  |  120<H>  |  72<H>  ----------------------------<  142<H>  3.6   |  22  |  2.62<H>    Ca    8.8      27 Jan 2022 05:55        CULTURES:   Catheterized Catheterized  01-19 @ 11:06   10,000 - 49,000 CFU/mL Yeast like cells "Susceptibilities not performed"  --  --      .Blood Blood-Peripheral  01-17 @ 17:49   No Growth Final  --  --      .Surgical Swab left foot wound  11-02 @ 13:15   Few Staphylococcus epidermidis  "Susceptibilities not performed"  --  --        RADIOLOGY:  no new studies 76y Male is under our care for leukocytosis.  Patient was seen laying in bed with no acute distress.  He is awake and alert, however lethargic and only giving yes or no answers.  Patient remains afebrile with elevated WBC count.    REVIEW OF SYSTEMS:  [  ] Not able to elicit  General: no fevers, malaise +  Chest: no cough no sob  GI: no nvd  : no urinary sxs   Skin: no rashes  Musculoskeletal: no trauma no LBP  Neuro: no ha's no dizziness     MEDS:    ALLERGIES: Allergies    No Known Allergies    Intolerances        VITALS:  Vital Signs Last 24 Hrs  T(C): 36.4 (27 Jan 2022 04:53), Max: 36.7 (26 Jan 2022 13:57)  T(F): 97.6 (27 Jan 2022 04:53), Max: 98 (26 Jan 2022 13:57)  HR: 93 (27 Jan 2022 04:53) (79 - 93)  BP: 120/66 (27 Jan 2022 04:53) (120/66 - 145/74)  BP(mean): --  RR: 18 (27 Jan 2022 04:53) (16 - 18)  SpO2: 99% (27 Jan 2022 04:53) (99% - 100%)      PHYSICAL EXAM:  HEENT: n/a  Neck: supple  Respiratory: lungs clear no rales  Cardiovascular: S1 S2 reg no murmurs  Gastrointestinal: +BS with soft, nondistended abdomen; nontender  : Molina catheter in place with mildly cloudy urine  Extremities: no edema  Skin: Bilateral feet necrotic ulcers  Ortho: n/a  Neuro: Awake and alert    LABS/DIAGNOSTIC TESTS:                        8.6    19.99 )-----------( 84       ( 27 Jan 2022 05:55 )             26.4     WBC Count: 19.99 K/uL (01-27 @ 05:55)  WBC Count: 19.05 K/uL (01-26 @ 08:21)  WBC Count: 22.09 K/uL (01-25 @ 07:50)  WBC Count: 15.01 K/uL (01-24 @ 05:16)  WBC Count: 8.64 K/uL (01-23 @ 05:29)    01-27    151<H>  |  120<H>  |  72<H>  ----------------------------<  142<H>  3.6   |  22  |  2.62<H>    Ca    8.8      27 Jan 2022 05:55        CULTURES:   Catheterized Catheterized  01-19 @ 11:06   10,000 - 49,000 CFU/mL Yeast like cells "Susceptibilities not performed"  --  --      .Blood Blood-Peripheral  01-17 @ 17:49   No Growth Final  --  --      .Surgical Swab left foot wound  11-02 @ 13:15   Few Staphylococcus epidermidis  "Susceptibilities not performed"  --  --        RADIOLOGY:  no new studies

## 2022-01-27 NOTE — CONSULT NOTE ADULT - PROBLEM SELECTOR RECOMMENDATION 9
Pt is AOX2.  Bedbound.  Total care.  No behavior issues.  FAST 7d.  Eligible for hospice.  Discussed dementia trajectory and provided anticipatory guidance.  Educated pt's daughter Arabella, about hospice philosophy and locations of care    Arabella needs to speak with her brother before making any decisions about treatment options.    Pt remains a FULL CODE
n/a

## 2022-01-27 NOTE — PROGRESS NOTE ADULT - ASSESSMENT
Septic Shock - resolved  Leukocytosis     Plan - Completed course of Diflican  will monitor WBC count for now, no antibiotics as yet.                     Septic Shock - resolved  Leukocytosis     Plan - Completed course of Diflican  will monitor WBC count for now, no antibiotics as yet.    I agree with above

## 2022-01-27 NOTE — PROGRESS NOTE ADULT - ASSESSMENT
1. OREN due to ATN.  -scr is improving with renal recovery and stable electrolytes.   -urinalysis shows granular casts. urine lytes not done.   -Adjust meds to eGFR and avoid IV Gadolinium contrast,NSAIDs, and phosphate enema.  -Monitor I/O's daily.   -Monitor SMA daily.  2. CKD stage 3 most likely due to diabetic nephropathy  -Baseline Scr around 1.2mg/dL  -Keep patient euvolemic and renal diet  -Avoid Nephrotoxic Meds/ Agents such as (NSAIDs, IV contrast, Aminoglycosides such as gentamicin, -Gadolinium contrast, Phosphate containing enemas, etc..)  -Adjust Medications according to eGFR  3. Anemia: hb is stable. monitor CBC  4. Hypotension: bp is stable.   5. MBD: phos is okay.   6. Hyponatremia due to lack of free water intake.  -Na worsening because not eating or drink. increase D5W at 75cc/hr. monitor Na.  7. Palliative Care consult for possible hospice care; family still deciding.       Discussed the assessment and plan with Primary Team/Nurse

## 2022-01-27 NOTE — PROGRESS NOTE ADULT - SUBJECTIVE AND OBJECTIVE BOX
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 Patient received 3L bolus in ED, s/p BP was still low 70/30s. Patient admitted to ICU for septic shock requiring pressors.     INTERVAL HPI/OVERNIGHT EVENTS:   No acute events noted o/n, Patient without complaints    REVIEW OF SYSTEMS:  CONSTITUTIONAL: No fever, weight loss, or fatigue  RESPIRATORY: No cough, wheezing, chills or hemoptysis; No shortness of breath  CARDIOVASCULAR: No chest pain, palpitations, dizziness, or leg swelling  GASTROINTESTINAL: No abdominal pain. No nausea, vomiting, or hematemesis; No diarrhea or constipation. No melena or hematochezia.  GENITOURINARY: No dysuria or hematuria, urinary frequency  NEUROLOGICAL: No headaches, memory loss, loss of strength, numbness, or tremors  SKIN: No itching, burning, rashes, or lesions     MEDICATIONS  (STANDING):  apixaban 2.5 milliGRAM(s) Oral two times a day  aspirin  chewable 81 milliGRAM(s) Oral daily  atorvastatin 40 milliGRAM(s) Oral at bedtime  dextrose 5%. 1000 milliLiter(s) (75 mL/Hr) IV Continuous <Continuous>  finasteride 5 milliGRAM(s) Oral daily  influenza  Vaccine (HIGH DOSE) 0.7 milliLiter(s) IntraMuscular once  insulin lispro (ADMELOG) corrective regimen sliding scale   SubCutaneous Before meals and at bedtime  metoprolol tartrate 12.5 milliGRAM(s) Oral every 12 hours  mirtazapine 7.5 milliGRAM(s) Oral daily  pantoprazole    Tablet 40 milliGRAM(s) Oral before breakfast  tamsulosin 0.4 milliGRAM(s) Oral at bedtime    MEDICATIONS  (PRN):  acetaminophen     Tablet .. 650 milliGRAM(s) Oral every 6 hours PRN Temp greater or equal to 38C (100.4F), Mild Pain (1 - 3)  ondansetron Injectable 4 milliGRAM(s) IV Push every 8 hours PRN Nausea and/or Vomiting      Vital Signs Last 24 Hrs  T(C): 36.4 (27 Jan 2022 04:53), Max: 36.4 (26 Jan 2022 21:01)  T(F): 97.6 (27 Jan 2022 04:53), Max: 97.6 (26 Jan 2022 21:01)  HR: 93 (27 Jan 2022 04:53) (84 - 93)  BP: 120/66 (27 Jan 2022 04:53) (120/66 - 128/74)  BP(mean): --  RR: 18 (27 Jan 2022 04:53) (18 - 18)  SpO2: 99% (27 Jan 2022 04:53) (99% - 100%)    PHYSICAL EXAMINATION:  General: No acute distress  HEENT: EOMI, PERRL  Neck: Supple, [ ] JVD  Lungs: decreased breath sounds   Heart: Regular rate and rhythm  Abdomen: Nontender, bowel sounds present  Extremities: No clubbing, cyanosis, edema  Nervous system:  Alert & Oriented X3, no focal deficits  Psychiatric: Normal affect  Skin: No rashes or lesions                          8.6    19.99 )-----------( 84       ( 27 Jan 2022 05:55 )             26.4     01-27    151<H>  |  120<H>  |  72<H>  ----------------------------<  142<H>  3.6   |  22  |  2.62<H>    Ca    8.8      27 Jan 2022 05:55                I&O's Summary    26 Jan 2022 07:01  -  27 Jan 2022 07:00  --------------------------------------------------------  IN: 0 mL / OUT: 1000 mL / NET: -1000 mL            CAPILLARY BLOOD GLUCOSE      RADIOLOGY & ADDITIONAL TESTS:                   PATIENT SEEN AND EXAMINED ON :- 1/27/2022  DATE OF SERVICE:   1/27/2022          Interim events noted,Labs ,Radiological studies and Cardiology tests reviewed .      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 Patient received 3L bolus in ED, s/p BP was still low 70/30s. Patient admitted to ICU for septic shock requiring pressors.     INTERVAL HPI/OVERNIGHT EVENTS:   No acute events noted o/n, Patient without complaints    REVIEW OF SYSTEMS:  CONSTITUTIONAL: No fever, weight loss, or fatigue  RESPIRATORY: No cough, wheezing, chills or hemoptysis; No shortness of breath  CARDIOVASCULAR: No chest pain, palpitations, dizziness, or leg swelling  GASTROINTESTINAL: No abdominal pain. No nausea, vomiting, or hematemesis; No diarrhea or constipation. No melena or hematochezia.  GENITOURINARY: No dysuria or hematuria, urinary frequency  NEUROLOGICAL: No headaches, memory loss, loss of strength, numbness, or tremors  SKIN: No itching, burning, rashes, or lesions     MEDICATIONS  (STANDING):  apixaban 2.5 milliGRAM(s) Oral two times a day  aspirin  chewable 81 milliGRAM(s) Oral daily  atorvastatin 40 milliGRAM(s) Oral at bedtime  dextrose 5%. 1000 milliLiter(s) (75 mL/Hr) IV Continuous <Continuous>  finasteride 5 milliGRAM(s) Oral daily  influenza  Vaccine (HIGH DOSE) 0.7 milliLiter(s) IntraMuscular once  insulin lispro (ADMELOG) corrective regimen sliding scale   SubCutaneous Before meals and at bedtime  metoprolol tartrate 12.5 milliGRAM(s) Oral every 12 hours  mirtazapine 7.5 milliGRAM(s) Oral daily  pantoprazole    Tablet 40 milliGRAM(s) Oral before breakfast  tamsulosin 0.4 milliGRAM(s) Oral at bedtime    MEDICATIONS  (PRN):  acetaminophen     Tablet .. 650 milliGRAM(s) Oral every 6 hours PRN Temp greater or equal to 38C (100.4F), Mild Pain (1 - 3)  ondansetron Injectable 4 milliGRAM(s) IV Push every 8 hours PRN Nausea and/or Vomiting      Vital Signs Last 24 Hrs  T(C): 36.4 (27 Jan 2022 04:53), Max: 36.4 (26 Jan 2022 21:01)  T(F): 97.6 (27 Jan 2022 04:53), Max: 97.6 (26 Jan 2022 21:01)  HR: 93 (27 Jan 2022 04:53) (84 - 93)  BP: 120/66 (27 Jan 2022 04:53) (120/66 - 128/74)  BP(mean): --  RR: 18 (27 Jan 2022 04:53) (18 - 18)  SpO2: 99% (27 Jan 2022 04:53) (99% - 100%)    PHYSICAL EXAMINATION:  General: No acute distress  HEENT: EOMI, PERRL  Neck: Supple, [ ] JVD  Lungs: decreased breath sounds   Heart: Regular rate and rhythm  Abdomen: Nontender, bowel sounds present  Extremities: No clubbing, cyanosis, edema  Nervous system:  Alert & Oriented X3, no focal deficits  Psychiatric: Normal affect  Skin: No rashes or lesions                          8.6    19.99 )-----------( 84       ( 27 Jan 2022 05:55 )             26.4     01-27    151<H>  |  120<H>  |  72<H>  ----------------------------<  142<H>  3.6   |  22  |  2.62<H>    Ca    8.8      27 Jan 2022 05:55                I&O's Summary    26 Jan 2022 07:01  -  27 Jan 2022 07:00  --------------------------------------------------------  IN: 0 mL / OUT: 1000 mL / NET: -1000 mL            CAPILLARY BLOOD GLUCOSE      RADIOLOGY & ADDITIONAL TESTS:

## 2022-01-27 NOTE — CONSULT NOTE ADULT - PROBLEM SELECTOR RECOMMENDATION 2
CKD stage 3.  Scr 2.62.  GFR 22.  Nephrology following     Avoid Nephrotoxic Meds/ NSAID OREN on CKD stage 3.  Scr 2.62.  GFR 22.  Nephrology following     Avoid Nephrotoxic Meds/ NSAID

## 2022-01-27 NOTE — CONSULT NOTE ADULT - PROBLEM SELECTOR RECOMMENDATION 3
Clinical evidence indicates that the patient has Severe protein calorie malnutrition/ 3rd degree. Albumin 2.3.  Pt was refusing to eat prior to admission    In context of     Chronic Illness (>1 month)    Energy/Food intake <50% of estimated energy requirement >5 days  Weight loss: Moderate   Body Fat loss: Severe   Cachexia, temporal wasting, muscle atrophy  Muscle mass loss: Severe  Skin failure/pressure ulcers   Strength: weakened severe bedbound    SLP eval noted    Recommend:   pleasure feeds as tolerated - aspiration precautions, careful hand-feeding, teaching to caregivers  nutritional supplements as tolerated, nutrition consult

## 2022-01-27 NOTE — PROGRESS NOTE ADULT - PROBLEM SELECTOR PLAN 1
around 4.2, improving  Patient has normal creatinine at baseline, 1.18 Nov 2021  Presented with elevated BUN/cr of 51/4.45 improving  Most likely ATN from hypoperfusion   S/p 6L bolus, No more fluids for now  Will monitor UO and BMP  Nephro on board, Dr. Sharon Morel care on board, patient eligible for hospice but patient remains full code for now, daughter wants to talk to pt's son

## 2022-01-28 DIAGNOSIS — F01.50 VASCULAR DEMENTIA WITHOUT BEHAVIORAL DISTURBANCE: ICD-10-CM

## 2022-01-28 DIAGNOSIS — R53.2 FUNCTIONAL QUADRIPLEGIA: ICD-10-CM

## 2022-01-28 LAB
ANION GAP SERPL CALC-SCNC: 6 MMOL/L — SIGNIFICANT CHANGE UP (ref 5–17)
BUN SERPL-MCNC: 59 MG/DL — HIGH (ref 7–18)
CALCIUM SERPL-MCNC: 8.6 MG/DL — SIGNIFICANT CHANGE UP (ref 8.4–10.5)
CHLORIDE SERPL-SCNC: 118 MMOL/L — HIGH (ref 96–108)
CO2 SERPL-SCNC: 24 MMOL/L — SIGNIFICANT CHANGE UP (ref 22–31)
CREAT SERPL-MCNC: 2.1 MG/DL — HIGH (ref 0.5–1.3)
GLUCOSE BLDC GLUCOMTR-MCNC: 150 MG/DL — HIGH (ref 70–99)
GLUCOSE BLDC GLUCOMTR-MCNC: 156 MG/DL — HIGH (ref 70–99)
GLUCOSE BLDC GLUCOMTR-MCNC: 158 MG/DL — HIGH (ref 70–99)
GLUCOSE BLDC GLUCOMTR-MCNC: 168 MG/DL — HIGH (ref 70–99)
GLUCOSE SERPL-MCNC: 175 MG/DL — HIGH (ref 70–99)
HCT VFR BLD CALC: 25.2 % — LOW (ref 39–50)
HGB BLD-MCNC: 8.1 G/DL — LOW (ref 13–17)
MAGNESIUM SERPL-MCNC: 1.8 MG/DL — SIGNIFICANT CHANGE UP (ref 1.6–2.6)
MCHC RBC-ENTMCNC: 28.9 PG — SIGNIFICANT CHANGE UP (ref 27–34)
MCHC RBC-ENTMCNC: 32.1 GM/DL — SIGNIFICANT CHANGE UP (ref 32–36)
MCV RBC AUTO: 90 FL — SIGNIFICANT CHANGE UP (ref 80–100)
NRBC # BLD: 0 /100 WBCS — SIGNIFICANT CHANGE UP (ref 0–0)
PHOSPHATE SERPL-MCNC: 2 MG/DL — LOW (ref 2.5–4.5)
PLATELET # BLD AUTO: 72 K/UL — LOW (ref 150–400)
POTASSIUM SERPL-MCNC: 3.9 MMOL/L — SIGNIFICANT CHANGE UP (ref 3.5–5.3)
POTASSIUM SERPL-SCNC: 3.9 MMOL/L — SIGNIFICANT CHANGE UP (ref 3.5–5.3)
RBC # BLD: 2.8 M/UL — LOW (ref 4.2–5.8)
RBC # FLD: 17.5 % — HIGH (ref 10.3–14.5)
SODIUM SERPL-SCNC: 148 MMOL/L — HIGH (ref 135–145)
WBC # BLD: 15.96 K/UL — HIGH (ref 3.8–10.5)
WBC # FLD AUTO: 15.96 K/UL — HIGH (ref 3.8–10.5)

## 2022-01-28 PROCEDURE — 99232 SBSQ HOSP IP/OBS MODERATE 35: CPT

## 2022-01-28 PROCEDURE — 99497 ADVNCD CARE PLAN 30 MIN: CPT | Mod: 25

## 2022-01-28 RX ORDER — POTASSIUM PHOSPHATE, MONOBASIC POTASSIUM PHOSPHATE, DIBASIC 236; 224 MG/ML; MG/ML
30 INJECTION, SOLUTION INTRAVENOUS ONCE
Refills: 0 | Status: COMPLETED | OUTPATIENT
Start: 2022-01-28 | End: 2022-01-28

## 2022-01-28 RX ADMIN — Medication 0: at 21:55

## 2022-01-28 RX ADMIN — Medication 2: at 17:14

## 2022-01-28 RX ADMIN — SODIUM CHLORIDE 75 MILLILITER(S): 9 INJECTION, SOLUTION INTRAVENOUS at 22:02

## 2022-01-28 RX ADMIN — SODIUM CHLORIDE 75 MILLILITER(S): 9 INJECTION, SOLUTION INTRAVENOUS at 05:39

## 2022-01-28 RX ADMIN — POTASSIUM PHOSPHATE, MONOBASIC POTASSIUM PHOSPHATE, DIBASIC 83.33 MILLIMOLE(S): 236; 224 INJECTION, SOLUTION INTRAVENOUS at 11:18

## 2022-01-28 NOTE — PROGRESS NOTE ADULT - SUBJECTIVE AND OBJECTIVE BOX
NEPHROLOGY MEDICAL CARE, [facility] - Dr. Tariq Herrera/ Dr. Lauren Lopez/ Dr. Dre Call/ Dr. Destiny Knott    Patient was seen and examined at bedside.    CC: patient is non-verbal but awake.    Vital Signs Last 24 Hrs  T(C): 36.5 (28 Jan 2022 13:25), Max: 36.8 (28 Jan 2022 05:29)  T(F): 97.7 (28 Jan 2022 13:25), Max: 98.3 (28 Jan 2022 05:29)  HR: 98 (28 Jan 2022 13:25) (94 - 103)  BP: 132/78 (28 Jan 2022 13:25) (123/57 - 135/70)  BP(mean): --  RR: 20 (28 Jan 2022 13:25) (16 - 20)  SpO2: 99% (28 Jan 2022 13:25) (96% - 100%)    01-27 @ 07:01  -  01-28 @ 07:00  --------------------------------------------------------  IN: 0 mL / OUT: 1200 mL / NET: -1200 mL        PHYSICAL EXAM:  General: No acute respiratory distress.  Eyes: conjunctiva and sclera clear  ENMT: Atraumatic, Normocephalic, supple, No JVD present. Moist mucous membranes  Respiratory: Bilateral poor air entry; No rales, rhonchi, wheezing  Cardiovassular: S1S2+; no m/r/g  Gastrointestinal: Soft, Non-tender, Nondistended; Bowel sounds present  : ibrahim's cath with brown colored urine  Neuro:  Awake but confused..   Ext: trace edema  Skin: No visible rashes    MEDICATIONS:  MEDICATIONS  (STANDING):  apixaban 2.5 milliGRAM(s) Oral two times a day  aspirin  chewable 81 milliGRAM(s) Oral daily  atorvastatin 40 milliGRAM(s) Oral at bedtime  dextrose 5%. 1000 milliLiter(s) (75 mL/Hr) IV Continuous <Continuous>  finasteride 5 milliGRAM(s) Oral daily  influenza  Vaccine (HIGH DOSE) 0.7 milliLiter(s) IntraMuscular once  insulin lispro (ADMELOG) corrective regimen sliding scale   SubCutaneous Before meals and at bedtime  metoprolol tartrate 12.5 milliGRAM(s) Oral every 12 hours  mirtazapine 7.5 milliGRAM(s) Oral daily  pantoprazole    Tablet 40 milliGRAM(s) Oral before breakfast  tamsulosin 0.4 milliGRAM(s) Oral at bedtime    MEDICATIONS  (PRN):  acetaminophen     Tablet .. 650 milliGRAM(s) Oral every 6 hours PRN Temp greater or equal to 38C (100.4F), Mild Pain (1 - 3)  ondansetron Injectable 4 milliGRAM(s) IV Push every 8 hours PRN Nausea and/or Vomiting          LABS:                        8.1    15.96 )-----------( 72       ( 28 Jan 2022 06:43 )             25.2     01-28    148<H>  |  118<H>  |  59<H>  ----------------------------<  175<H>  3.9   |  24  |  2.10<H>    Ca    8.6      28 Jan 2022 06:43  Phos  2.0     01-28  Mg     1.8     01-28          Magnesium, Serum: 1.8 mg/dL (01-28 @ 06:43)  Phosphorus Level, Serum: 2.0 mg/dL (01-28 @ 06:43)    Urine studies    PTH and Vit D:

## 2022-01-28 NOTE — PROGRESS NOTE ADULT - SUBJECTIVE AND OBJECTIVE BOX
PATIENT SEEN AND EXAMINED ON :- 1/27/2022  DATE OF SERVICE:   1/27/2022          Interim events noted,Labs ,Radiological studies and Cardiology tests reviewed .      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 Patient received 3L bolus in ED, s/p BP was still low 70/30s. Patient admitted to ICU for septic shock requiring pressors.     INTERVAL HPI/OVERNIGHT EVENTS:   No acute events noted o/n, Patient without complaints    REVIEW OF SYSTEMS:  CONSTITUTIONAL: No fever, weight loss, or fatigue  RESPIRATORY: No cough, wheezing, chills or hemoptysis; No shortness of breath  CARDIOVASCULAR: No chest pain, palpitations, dizziness, or leg swelling  GASTROINTESTINAL: No abdominal pain. No nausea, vomiting, or hematemesis; No diarrhea or constipation. No melena or hematochezia.  GENITOURINARY: No dysuria or hematuria, urinary frequency  NEUROLOGICAL: No headaches, memory loss, loss of strength, numbness, or tremors  SKIN: No itching, burning, rashes, or lesions     MEDICATIONS  (STANDING):  apixaban 2.5 milliGRAM(s) Oral two times a day  aspirin  chewable 81 milliGRAM(s) Oral daily  atorvastatin 40 milliGRAM(s) Oral at bedtime  dextrose 5%. 1000 milliLiter(s) (75 mL/Hr) IV Continuous <Continuous>  finasteride 5 milliGRAM(s) Oral daily  influenza  Vaccine (HIGH DOSE) 0.7 milliLiter(s) IntraMuscular once  insulin lispro (ADMELOG) corrective regimen sliding scale   SubCutaneous Before meals and at bedtime  metoprolol tartrate 12.5 milliGRAM(s) Oral every 12 hours  mirtazapine 7.5 milliGRAM(s) Oral daily  pantoprazole    Tablet 40 milliGRAM(s) Oral before breakfast  tamsulosin 0.4 milliGRAM(s) Oral at bedtime    MEDICATIONS  (PRN):  acetaminophen     Tablet .. 650 milliGRAM(s) Oral every 6 hours PRN Temp greater or equal to 38C (100.4F), Mild Pain (1 - 3)  ondansetron Injectable 4 milliGRAM(s) IV Push every 8 hours PRN Nausea and/or Vomiting      Vital Signs Last 24 Hrs  T(C): 36.4 (27 Jan 2022 04:53), Max: 36.4 (26 Jan 2022 21:01)  T(F): 97.6 (27 Jan 2022 04:53), Max: 97.6 (26 Jan 2022 21:01)  HR: 93 (27 Jan 2022 04:53) (84 - 93)  BP: 120/66 (27 Jan 2022 04:53) (120/66 - 128/74)  BP(mean): --  RR: 18 (27 Jan 2022 04:53) (18 - 18)  SpO2: 99% (27 Jan 2022 04:53) (99% - 100%)    PHYSICAL EXAMINATION:  General: No acute distress  HEENT: EOMI, PERRL  Neck: Supple, [ ] JVD  Lungs: decreased breath sounds   Heart: Regular rate and rhythm  Abdomen: Nontender, bowel sounds present  Extremities: No clubbing, cyanosis, edema  Nervous system:  Alert & Oriented X3, no focal deficits  Psychiatric: Normal affect  Skin: No rashes or lesions                          8.6    19.99 )-----------( 84       ( 27 Jan 2022 05:55 )             26.4     01-27    151<H>  |  120<H>  |  72<H>  ----------------------------<  142<H>  3.6   |  22  |  2.62<H>    Ca    8.8      27 Jan 2022 05:55                I&O's Summary    26 Jan 2022 07:01  -  27 Jan 2022 07:00  --------------------------------------------------------  IN: 0 mL / OUT: 1000 mL / NET: -1000 mL            CAPILLARY BLOOD GLUCOSE      RADIOLOGY & ADDITIONAL TESTS:

## 2022-01-28 NOTE — PROGRESS NOTE ADULT - ASSESSMENT
Septic Shock - resolved  Leukocytosis - improving     Plan - Will monitor WBC count for now, no antibiotics as yet.                           Septic Shock - resolved  Leukocytosis - improving     Plan - Will monitor WBC count for now, no antibiotics as yet.    I agree with above

## 2022-01-28 NOTE — PROGRESS NOTE ADULT - ASSESSMENT
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 Patient received 3L bolus in ED, s/p BP was still low 70/30s. Patient admitted to ICU for septic shock requiring pressors.     #Septic shock 2/2 ulcers vs UTI  #Delirium  #Acute renal failure  #Penile ulcer  #Sacral ulcer  #Diabetic foot ulcer  #Elevated INR  #Elevated LFTs

## 2022-01-28 NOTE — PROGRESS NOTE ADULT - ASSESSMENT
1. OREN due to ATN.  -scr is improving with renal recovery and stable electrolytes.   -urinalysis shows granular casts. urine lytes not done.   -Adjust meds to eGFR and avoid IV Gadolinium contrast,NSAIDs, and phosphate enema.  -Monitor I/O's daily.   -Monitor SMA daily.  2. CKD stage 3 most likely due to diabetic nephropathy  -Baseline Scr around 1.2mg/dL  -Keep patient euvolemic and renal diet  -Avoid Nephrotoxic Meds/ Agents such as (NSAIDs, IV contrast, Aminoglycosides such as gentamicin, -Gadolinium contrast, Phosphate containing enemas, etc..)  -Adjust Medications according to eGFR  3. Anemia: hb is stable. monitor CBC  4. Hypotension: bp is stable.   5. MBD: phos is okay.   6. Hyponatremia due to lack of free water intake.  -Na improving and continue D5W at 75cc/hr. monitor Na.  7. Palliative Care consult for possible hospice care; family still deciding.       Discussed the assessment and plan with Primary Team/Nurse

## 2022-01-28 NOTE — PROGRESS NOTE ADULT - PROBLEM SELECTOR PLAN 1
usually A&Ox3 at baseline, w superimposed delirium likely 2/2 acute illness, hospitalization. 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

## 2022-01-28 NOTE — PROGRESS NOTE ADULT - SUBJECTIVE AND OBJECTIVE BOX
follow up on:  complex medical decision making related to goals of care    OVERNIGHT EVENTS: somewhat lethargic, opens eyes, answers simple questions, NAD. Son and daughter at bedside.     Present Symptoms:      Review of Systems:   Unable to obtain due to poor mentation     MEDICATIONS  (STANDING):  apixaban 2.5 milliGRAM(s) Oral two times a day  aspirin  chewable 81 milliGRAM(s) Oral daily  atorvastatin 40 milliGRAM(s) Oral at bedtime  dextrose 5%. 1000 milliLiter(s) (75 mL/Hr) IV Continuous <Continuous>  finasteride 5 milliGRAM(s) Oral daily  influenza  Vaccine (HIGH DOSE) 0.7 milliLiter(s) IntraMuscular once  insulin lispro (ADMELOG) corrective regimen sliding scale   SubCutaneous Before meals and at bedtime  metoprolol tartrate 12.5 milliGRAM(s) Oral every 12 hours  mirtazapine 7.5 milliGRAM(s) Oral daily  pantoprazole    Tablet 40 milliGRAM(s) Oral before breakfast  tamsulosin 0.4 milliGRAM(s) Oral at bedtime    MEDICATIONS  (PRN):  acetaminophen     Tablet .. 650 milliGRAM(s) Oral every 6 hours PRN Temp greater or equal to 38C (100.4F), Mild Pain (1 - 3)  ondansetron Injectable 4 milliGRAM(s) IV Push every 8 hours PRN Nausea and/or Vomiting      PHYSICAL EXAM:  Vital Signs Last 24 Hrs  T(C): 36.5 (28 Jan 2022 13:25), Max: 36.8 (28 Jan 2022 05:29)  T(F): 97.7 (28 Jan 2022 13:25), Max: 98.3 (28 Jan 2022 05:29)  HR: 98 (28 Jan 2022 13:25) (94 - 103)  BP: 132/78 (28 Jan 2022 13:25) (123/57 - 135/70)  BP(mean): --  RR: 20 (28 Jan 2022 13:25) (16 - 20)  SpO2: 99% (28 Jan 2022 13:25) (96% - 100%)       Palliative Performance Scale/Karnofsky Score: 20      GENERAL: somewhat lethargic, answers simple questions, cachectic,  NAD  HEENT: Atraumatic, oropharynx clear, neck supple  CHEST/LUNG: unlabored  HEART: Regular rate and rhythm    ABDOMEN: Soft, Nontender, Nondistended   MUSCULOSKELETAL:  No  edema,  bedbound  NERVOUS SYSTEM:  somewhat lethargic, opens eyes, answers simple questions  SKIN: LE ulcer, St IV sacral decub noted  Oral intake: npo     LABS:                          8.1    15.96 )-----------( 72       ( 28 Jan 2022 06:43 )             25.2     01-28    148<H>  |  118<H>  |  59<H>  ----------------------------<  175<H>  3.9   |  24  |  2.10<H>    Ca    8.6      28 Jan 2022 06:43  Phos  2.0     01-28  Mg     1.8     01-28          RADIOLOGY & ADDITIONAL STUDIES:

## 2022-01-28 NOTE — PROGRESS NOTE ADULT - SUBJECTIVE AND OBJECTIVE BOX
76y Male is under our care for     REVIEW OF SYSTEMS:  [  ] Not able to elicit  General:	  Chest:	  GI:	  :  Skin:	  Musculoskeletal:	  Neuro:	    MEDS:    ALLERGIES: Allergies    No Known Allergies    Intolerances        VITALS:  Vital Signs Last 24 Hrs  T(C): 36.8 (28 Jan 2022 05:29), Max: 36.8 (27 Jan 2022 14:10)  T(F): 98.3 (28 Jan 2022 05:29), Max: 98.3 (27 Jan 2022 14:10)  HR: 103 (28 Jan 2022 05:29) (83 - 103)  BP: 123/57 (28 Jan 2022 05:29) (118/71 - 135/70)  BP(mean): --  RR: 20 (28 Jan 2022 05:29) (16 - 20)  SpO2: 96% (28 Jan 2022 05:29) (94% - 100%)      PHYSICAL EXAM:  HEENT:  Neck:  Respiratory:  Cardiovascular:  Gastrointestinal:  Extremities:  Skin:  Ortho:  Neuro:    LABS/DIAGNOSTIC TESTS:                        8.1    15.96 )-----------( 72       ( 28 Jan 2022 06:43 )             25.2     WBC Count: 15.96 K/uL (01-28 @ 06:43)  WBC Count: 19.99 K/uL (01-27 @ 05:55)  WBC Count: 19.05 K/uL (01-26 @ 08:21)  WBC Count: 22.09 K/uL (01-25 @ 07:50)  WBC Count: 15.01 K/uL (01-24 @ 05:16)    01-28    148<H>  |  118<H>  |  59<H>  ----------------------------<  175<H>  3.9   |  24  |  2.10<H>    Ca    8.6      28 Jan 2022 06:43  Phos  2.0     01-28  Mg     1.8     01-28        CULTURES:   Catheterized Catheterized  01-19 @ 11:06   10,000 - 49,000 CFU/mL Yeast like cells "Susceptibilities not performed"  --  --      .Blood Blood-Peripheral  01-17 @ 17:49   No Growth Final  --  --      .Surgical Swab left foot wound  11-02 @ 13:15   Few Staphylococcus epidermidis  "Susceptibilities not performed"  --  --        RADIOLOGY:  no new studies 76y Male is under our care for leukocytosis.  Patient was seen lethargic laying in bed with no acute distress.  He denies any pain at this time, remains afebrile and WBC count is downtrending.    REVIEW OF SYSTEMS:  [  ] Not able to elicit  General: no fevers, malaise +  Chest: no cough no sob  GI: no nvd  : no urinary sxs   Skin: no rashes  Musculoskeletal: no trauma no LBP  Neuro: no ha's no dizziness     MEDS:    ALLERGIES: Allergies    No Known Allergies    Intolerances        VITALS:  Vital Signs Last 24 Hrs  T(C): 36.8 (28 Jan 2022 05:29), Max: 36.8 (27 Jan 2022 14:10)  T(F): 98.3 (28 Jan 2022 05:29), Max: 98.3 (27 Jan 2022 14:10)  HR: 103 (28 Jan 2022 05:29) (83 - 103)  BP: 123/57 (28 Jan 2022 05:29) (118/71 - 135/70)  BP(mean): --  RR: 20 (28 Jan 2022 05:29) (16 - 20)  SpO2: 96% (28 Jan 2022 05:29) (94% - 100%)      PHYSICAL EXAM:  HEENT: n/a  Neck: supple  Respiratory: lungs clear no rales  Cardiovascular: S1 S2 reg no murmurs  Gastrointestinal: +BS with soft, nondistended abdomen; nontender  : Molina catheter in place with mildly cloudy urine  Extremities: no edema  Skin: Bilateral feet necrotic ulcers  Ortho: n/a  Neuro: Awake and alert      LABS/DIAGNOSTIC TESTS:                        8.1    15.96 )-----------( 72       ( 28 Jan 2022 06:43 )             25.2     WBC Count: 15.96 K/uL (01-28 @ 06:43)  WBC Count: 19.99 K/uL (01-27 @ 05:55)  WBC Count: 19.05 K/uL (01-26 @ 08:21)  WBC Count: 22.09 K/uL (01-25 @ 07:50)  WBC Count: 15.01 K/uL (01-24 @ 05:16)    01-28    148<H>  |  118<H>  |  59<H>  ----------------------------<  175<H>  3.9   |  24  |  2.10<H>    Ca    8.6      28 Jan 2022 06:43  Phos  2.0     01-28  Mg     1.8     01-28        CULTURES:   Catheterized Catheterized  01-19 @ 11:06   10,000 - 49,000 CFU/mL Yeast like cells "Susceptibilities not performed"  --  --      .Blood Blood-Peripheral  01-17 @ 17:49   No Growth Final  --  --      .Surgical Swab left foot wound  11-02 @ 13:15   Few Staphylococcus epidermidis  "Susceptibilities not performed"  --  --        RADIOLOGY:  no new studies

## 2022-01-29 LAB
ANION GAP SERPL CALC-SCNC: 7 MMOL/L — SIGNIFICANT CHANGE UP (ref 5–17)
BUN SERPL-MCNC: 47 MG/DL — HIGH (ref 7–18)
CALCIUM SERPL-MCNC: 8.1 MG/DL — LOW (ref 8.4–10.5)
CHLORIDE SERPL-SCNC: 115 MMOL/L — HIGH (ref 96–108)
CO2 SERPL-SCNC: 24 MMOL/L — SIGNIFICANT CHANGE UP (ref 22–31)
CREAT SERPL-MCNC: 1.51 MG/DL — HIGH (ref 0.5–1.3)
GLUCOSE BLDC GLUCOMTR-MCNC: 130 MG/DL — HIGH (ref 70–99)
GLUCOSE BLDC GLUCOMTR-MCNC: 134 MG/DL — HIGH (ref 70–99)
GLUCOSE BLDC GLUCOMTR-MCNC: 149 MG/DL — HIGH (ref 70–99)
GLUCOSE BLDC GLUCOMTR-MCNC: 155 MG/DL — HIGH (ref 70–99)
GLUCOSE SERPL-MCNC: 128 MG/DL — HIGH (ref 70–99)
HCT VFR BLD CALC: 26.2 % — LOW (ref 39–50)
HGB BLD-MCNC: 8.6 G/DL — LOW (ref 13–17)
MCHC RBC-ENTMCNC: 28.6 PG — SIGNIFICANT CHANGE UP (ref 27–34)
MCHC RBC-ENTMCNC: 32.8 GM/DL — SIGNIFICANT CHANGE UP (ref 32–36)
MCV RBC AUTO: 87 FL — SIGNIFICANT CHANGE UP (ref 80–100)
NRBC # BLD: 0 /100 WBCS — SIGNIFICANT CHANGE UP (ref 0–0)
PLATELET # BLD AUTO: 83 K/UL — LOW (ref 150–400)
POTASSIUM SERPL-MCNC: 3.8 MMOL/L — SIGNIFICANT CHANGE UP (ref 3.5–5.3)
POTASSIUM SERPL-SCNC: 3.8 MMOL/L — SIGNIFICANT CHANGE UP (ref 3.5–5.3)
RBC # BLD: 3.01 M/UL — LOW (ref 4.2–5.8)
RBC # FLD: 17.7 % — HIGH (ref 10.3–14.5)
SODIUM SERPL-SCNC: 146 MMOL/L — HIGH (ref 135–145)
WBC # BLD: 12.71 K/UL — HIGH (ref 3.8–10.5)
WBC # FLD AUTO: 12.71 K/UL — HIGH (ref 3.8–10.5)

## 2022-01-29 RX ADMIN — SODIUM CHLORIDE 75 MILLILITER(S): 9 INJECTION, SOLUTION INTRAVENOUS at 18:03

## 2022-01-29 RX ADMIN — Medication 81 MILLIGRAM(S): at 14:39

## 2022-01-29 RX ADMIN — SODIUM CHLORIDE 75 MILLILITER(S): 9 INJECTION, SOLUTION INTRAVENOUS at 22:45

## 2022-01-29 RX ADMIN — MIRTAZAPINE 7.5 MILLIGRAM(S): 45 TABLET, ORALLY DISINTEGRATING ORAL at 14:35

## 2022-01-29 RX ADMIN — FINASTERIDE 5 MILLIGRAM(S): 5 TABLET, FILM COATED ORAL at 14:39

## 2022-01-29 NOTE — PROGRESS NOTE ADULT - ASSESSMENT
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 Patient received 3L bolus in ED, s/p BP was still low 70/30s. Patient admitted to ICU for septic shock requiring pressors.     #Septic shock 2/2 ulcers vs UTI  #Delirium  #Acute renal failure  #Penile ulcer  #Sacral ulcer  #Diabetic foot ulcer  #Elevated INR  #Elevated LFTs

## 2022-01-29 NOTE — PROGRESS NOTE ADULT - SUBJECTIVE AND OBJECTIVE BOX
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 Patient is alert and oriented on encounter. Patient states that he cut his foot and his back was hurting. Patient is unable to provide much history due to discomfort. Patient has stage 3 ulcers on sacrum, penile ulcer. Also has chronic wounds on foot. He was found to be hypotensive on field 60/30. He was given 3L bolus in ED after which BP was still low 70/30. Patient denies fever. As per AL records no h/o abdominal pain, nausea, vomiting, diarrhea, cough.  (17 Jan 2022 15:23)      INTERIM EVENTS:      PMH -reviewed admission note, no change since admission  Heart Failure: Acute [ ] Chronic [ ] Acute on Chronic [ ] Diastolic [ ] Systolic [ ] Combined Systolic and Diastolic[ ]  OREN[ ]  ATN[ ]  CKD I [ ] CKDII [ ] CKD III [ ] CKD IV [ ] CKD V [ ] ESRD[ ]  HTN[ ] CVA[ ] DM[ ] COPD[ ] COVID[ ] AF[ ]  PPM[ ] ICD[ ]    MEDICATIONS  (STANDING):  apixaban 2.5 milliGRAM(s) Oral two times a day  aspirin  chewable 81 milliGRAM(s) Oral daily  atorvastatin 40 milliGRAM(s) Oral at bedtime  dextrose 5%. 1000 milliLiter(s) (75 mL/Hr) IV Continuous <Continuous>  finasteride 5 milliGRAM(s) Oral daily  influenza  Vaccine (HIGH DOSE) 0.7 milliLiter(s) IntraMuscular once  insulin lispro (ADMELOG) corrective regimen sliding scale   SubCutaneous Before meals and at bedtime  metoprolol tartrate 12.5 milliGRAM(s) Oral every 12 hours  mirtazapine 7.5 milliGRAM(s) Oral daily  pantoprazole    Tablet 40 milliGRAM(s) Oral before breakfast  tamsulosin 0.4 milliGRAM(s) Oral at bedtime    MEDICATIONS  (PRN):  acetaminophen     Tablet .. 650 milliGRAM(s) Oral every 6 hours PRN Temp greater or equal to 38C (100.4F), Mild Pain (1 - 3)  ondansetron Injectable 4 milliGRAM(s) IV Push every 8 hours PRN Nausea and/or Vomiting            REVIEW OF SYSTEMS:  Constitutional: [ ] fever, [ ]weight loss,  [ ]fatigue  Eyes: [ ] visual changes  Respiratory: [ ]shortness of breath;  [ ] cough, [ ]wheezing, [ ]chills, [ ]hemoptysis  Cardiovascular: [ ] chest pain, [ ]palpitations, [ ]dizziness,  [ ]leg swelling[ ]orthopnea[ ]PND  Gastrointestinal: [ ] abdominal pain, [ ]nausea, [ ]vomiting,  [ ]diarrhea [ ]Constipation [ ]Melena  Genitourinary: [ ] dysuria, [ ] hematuria [ ]Molina  Neurologic: [ ] headaches [ ] tremors[ ]weakness [ ]Paralysis Right[ ] Left[ ]  Skin: [ ] itching, [ ]burning, [ ] rashes  Endocrine: [ ] heat or cold intolerance  Musculoskeletal: [ ] joint pain or swelling; [ ] muscle, back, or extremity pain  Psychiatric: [ ] depression, [ ]anxiety, [ ]mood swings, or [ ]difficulty sleeping  Hematologic: [ ] easy bruising, [ ] bleeding gums    [x] All remaining systems negative except as per above.   [ ]Unable to obtain.    Vital Signs Last 24 Hrs  T(C): 36.8 (29 Jan 2022 04:46), Max: 36.8 (29 Jan 2022 04:46)  T(F): 98.2 (29 Jan 2022 04:46), Max: 98.2 (29 Jan 2022 04:46)  HR: 72 (29 Jan 2022 04:46) (72 - 98)  BP: 130/58 (29 Jan 2022 04:46) (128/75 - 132/78)  BP(mean): --  RR: 18 (29 Jan 2022 04:46) (18 - 20)  SpO2: 97% (29 Jan 2022 04:46) (97% - 99%)  I&O's Summary    28 Jan 2022 07:01  -  29 Jan 2022 07:00  --------------------------------------------------------  IN: 0 mL / OUT: 375 mL / NET: -375 mL        PHYSICAL EXAM:  General: No acute distress BMI-  HEENT: EOMI, PERRL  Neck: Supple, [ ] JVD  Lungs: Equal air entry bilaterally; [ ] rales [ ] wheezing [ ] rhonchi  Heart: Regular rate and rhythm; [ ] murmur   /6 [ ] systolic [ ] diastolic [ ] radiation[ ] rubs [ ]  gallops  Abdomen: Nontender, bowel sounds present  Extremities: No clubbing, cyanosis, [ ] edema [ ]Pulses  equal and intact  Nervous system:  Alert & Oriented X3, no focal deficits  Psychiatric: Normal affect  Skin: No rashes or lesions    LABS:  01-28    148<H>  |  118<H>  |  59<H>  ----------------------------<  175<H>  3.9   |  24  |  2.10<H>    Ca    8.6      28 Jan 2022 06:43  Phos  2.0     01-28  Mg     1.8     01-28      Creatinine Trend: 2.10<--, 2.35<--, 2.62<--, 3.23<--, 3.79<--, 4.11<--                        8.1    15.96 )-----------( 72       ( 28 Jan 2022 06:43 )             25.2         Cardiac Enzymes:           RADIOLOGY:    ECG [my interpretation]:    TELEMETRY:Reviewed monitor tracings-    ECHO:    STRESS TEST:    CATHETERIZATION:      IMPRESSION AND PLAN:

## 2022-01-30 LAB
ANION GAP SERPL CALC-SCNC: 8 MMOL/L — SIGNIFICANT CHANGE UP (ref 5–17)
BUN SERPL-MCNC: 41 MG/DL — HIGH (ref 7–18)
CALCIUM SERPL-MCNC: 8.1 MG/DL — LOW (ref 8.4–10.5)
CHLORIDE SERPL-SCNC: 113 MMOL/L — HIGH (ref 96–108)
CO2 SERPL-SCNC: 24 MMOL/L — SIGNIFICANT CHANGE UP (ref 22–31)
CREAT SERPL-MCNC: 1.34 MG/DL — HIGH (ref 0.5–1.3)
GLUCOSE BLDC GLUCOMTR-MCNC: 109 MG/DL — HIGH (ref 70–99)
GLUCOSE BLDC GLUCOMTR-MCNC: 129 MG/DL — HIGH (ref 70–99)
GLUCOSE BLDC GLUCOMTR-MCNC: 154 MG/DL — HIGH (ref 70–99)
GLUCOSE BLDC GLUCOMTR-MCNC: 156 MG/DL — HIGH (ref 70–99)
GLUCOSE SERPL-MCNC: 152 MG/DL — HIGH (ref 70–99)
HCT VFR BLD CALC: 23.9 % — LOW (ref 39–50)
HGB BLD-MCNC: 7.6 G/DL — LOW (ref 13–17)
MCHC RBC-ENTMCNC: 28 PG — SIGNIFICANT CHANGE UP (ref 27–34)
MCHC RBC-ENTMCNC: 31.8 GM/DL — LOW (ref 32–36)
MCV RBC AUTO: 88.2 FL — SIGNIFICANT CHANGE UP (ref 80–100)
NRBC # BLD: 0 /100 WBCS — SIGNIFICANT CHANGE UP (ref 0–0)
PLATELET # BLD AUTO: 88 K/UL — LOW (ref 150–400)
POTASSIUM SERPL-MCNC: 3.5 MMOL/L — SIGNIFICANT CHANGE UP (ref 3.5–5.3)
POTASSIUM SERPL-SCNC: 3.5 MMOL/L — SIGNIFICANT CHANGE UP (ref 3.5–5.3)
RBC # BLD: 2.71 M/UL — LOW (ref 4.2–5.8)
RBC # FLD: 17.3 % — HIGH (ref 10.3–14.5)
SARS-COV-2 RNA SPEC QL NAA+PROBE: SIGNIFICANT CHANGE UP
SODIUM SERPL-SCNC: 145 MMOL/L — SIGNIFICANT CHANGE UP (ref 135–145)
WBC # BLD: 8.47 K/UL — SIGNIFICANT CHANGE UP (ref 3.8–10.5)
WBC # FLD AUTO: 8.47 K/UL — SIGNIFICANT CHANGE UP (ref 3.8–10.5)

## 2022-01-30 RX ORDER — NYSTATIN CREAM 100000 [USP'U]/G
1 CREAM TOPICAL
Refills: 0 | Status: DISCONTINUED | OUTPATIENT
Start: 2022-01-30 | End: 2022-02-10

## 2022-01-30 RX ORDER — INSULIN LISPRO 100/ML
VIAL (ML) SUBCUTANEOUS EVERY 6 HOURS
Refills: 0 | Status: DISCONTINUED | OUTPATIENT
Start: 2022-01-30 | End: 2022-02-10

## 2022-01-30 RX ADMIN — SODIUM CHLORIDE 75 MILLILITER(S): 9 INJECTION, SOLUTION INTRAVENOUS at 07:30

## 2022-01-30 RX ADMIN — SODIUM CHLORIDE 75 MILLILITER(S): 9 INJECTION, SOLUTION INTRAVENOUS at 06:08

## 2022-01-30 RX ADMIN — Medication 2: at 07:31

## 2022-01-30 RX ADMIN — Medication 2: at 11:56

## 2022-01-30 RX ADMIN — NYSTATIN CREAM 1 APPLICATION(S): 100000 CREAM TOPICAL at 17:09

## 2022-01-30 RX ADMIN — SODIUM CHLORIDE 75 MILLILITER(S): 9 INJECTION, SOLUTION INTRAVENOUS at 21:51

## 2022-01-30 NOTE — PROGRESS NOTE ADULT - ASSESSMENT
Leukocytosis - resolved    Plan:  ·	remain off antibiotics monitoring for now                             Leukocytosis - resolved    Plan:  ·	remain off antibiotics monitoring for now    I agree with above

## 2022-01-30 NOTE — PROGRESS NOTE ADULT - SUBJECTIVE AND OBJECTIVE BOX
76y Male is under our care for leukocytosis and prior septic shock. Patient was seen at bedside and his demeanor was not welcoming. He has no intentions to discuss his symptoms and was difficult during examination. No fevers and his wbc count has normalized. Renal function is also improving.     MEDS: no antibiotics     ALLERGIES: Allergies    No Known Allergies    Intolerances      REVIEW OF SYSTEMS:  [ X ] Not able to illicit  General:	  Chest:	  GI:	  :  Skin:	  Musculoskeletal:	  Neuro:	    VITALS:  Vital Signs Last 24 Hrs  T(C): 36.4 (30 Jan 2022 13:50), Max: 36.6 (30 Jan 2022 04:46)  T(F): 97.5 (30 Jan 2022 13:50), Max: 97.9 (30 Jan 2022 04:46)  HR: 94 (30 Jan 2022 13:50) (94 - 102)  BP: 126/73 (30 Jan 2022 13:50) (119/71 - 131/70)  BP(mean): --  RR: 16 (30 Jan 2022 13:50) (16 - 17)  SpO2: 99% (30 Jan 2022 13:50) (99% - 100%)    PHYSICAL EXAM:  HEENT: n/a  Neck: supple  Respiratory: lungs clear no rales  Cardiovascular: S1 S2 reg no murmurs  Gastrointestinal: refused exam  Extremities: no edema  Skin: not was to fully assess due to patient constantly pulling the covers over himself  Ortho: n/a  Neuro: Awake and alert, but agitated and evasive      LABS/DIAGNOSTIC TESTS:                        7.6    8.47  )-----------( 88       ( 30 Jan 2022 07:14 )             23.9     WBC Count: 8.47 K/uL (01-30 @ 07:14)  WBC Count: 12.71 K/uL (01-29 @ 14:03)  WBC Count: 15.96 K/uL (01-28 @ 06:43)  WBC Count: 19.99 K/uL (01-27 @ 05:55)  WBC Count: 19.05 K/uL (01-26 @ 08:21)    01-30    145  |  113<H>  |  41<H>  ----------------------------<  152<H>  3.5   |  24  |  1.34<H> 12.71 < 15.96    Ca    8.1<L>      30 Jan 2022 07:14        CULTURES:   Catheterized Catheterized  01-19 @ 11:06   10,000 - 49,000 CFU/mL Yeast like cells "Susceptibilities not performed"  --  --      .Blood Blood-Peripheral  01-17 @ 17:49   No Growth Final  --  --        RADIOLOGY:  no new studies

## 2022-01-30 NOTE — PROGRESS NOTE ADULT - SUBJECTIVE AND OBJECTIVE BOX
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 Patient received 3L bolus in ED, s/p BP was still low 70/30s. Patient admitted to ICU for septic shock requiring pressors.     INTERVAL HPI/OVERNIGHT EVENTS:   No acute overnight events noted    REVIEW OF SYSTEMS:  CONSTITUTIONAL: No fever, weight loss, or fatigue  RESPIRATORY: No cough, wheezing, chills or hemoptysis; No shortness of breath  CARDIOVASCULAR: No chest pain, palpitations, dizziness, or leg swelling  GASTROINTESTINAL: No abdominal pain. No nausea, vomiting, or hematemesis; No diarrhea or constipation. No melena or hematochezia.  GENITOURINARY: No dysuria or hematuria, urinary frequency  NEUROLOGICAL: No headaches, memory loss, loss of strength, numbness, or tremors  SKIN: No itching, burning, rashes, or lesions     MEDICATIONS  (STANDING):  apixaban 2.5 milliGRAM(s) Oral two times a day  aspirin  chewable 81 milliGRAM(s) Oral daily  atorvastatin 40 milliGRAM(s) Oral at bedtime  dextrose 5%. 1000 milliLiter(s) (75 mL/Hr) IV Continuous <Continuous>  finasteride 5 milliGRAM(s) Oral daily  influenza  Vaccine (HIGH DOSE) 0.7 milliLiter(s) IntraMuscular once  insulin lispro (ADMELOG) corrective regimen sliding scale   SubCutaneous every 6 hours  metoprolol tartrate 12.5 milliGRAM(s) Oral every 12 hours  mirtazapine 7.5 milliGRAM(s) Oral daily  pantoprazole    Tablet 40 milliGRAM(s) Oral before breakfast  tamsulosin 0.4 milliGRAM(s) Oral at bedtime    MEDICATIONS  (PRN):  acetaminophen     Tablet .. 650 milliGRAM(s) Oral every 6 hours PRN Temp greater or equal to 38C (100.4F), Mild Pain (1 - 3)  ondansetron Injectable 4 milliGRAM(s) IV Push every 8 hours PRN Nausea and/or Vomiting      Vital Signs Last 24 Hrs  T(C): 36.6 (30 Jan 2022 04:46), Max: 36.6 (30 Jan 2022 04:46)  T(F): 97.9 (30 Jan 2022 04:46), Max: 97.9 (30 Jan 2022 04:46)  HR: 102 (30 Jan 2022 04:46) (64 - 102)  BP: 131/70 (30 Jan 2022 04:46) (114/- - 131/70)  BP(mean): --  RR: 17 (30 Jan 2022 04:46) (17 - 18)  SpO2: 99% (30 Jan 2022 04:46) (97% - 100%)    PHYSICAL EXAMINATION:  General: No acute distress  HEENT: EOMI, PERRL  Neck: Supple, [ ] JVD  Lungs: decreased breath sounds   Heart: Regular rate and rhythm  Abdomen: Nontender, bowel sounds present  Extremities: No clubbing, cyanosis, edema  Nervous system:  Alert & Oriented X3, no focal deficits  Psychiatric: Normal affect  Skin: No rashes or lesions                          8.6    12.71 )-----------( 83       ( 29 Jan 2022 14:03 )             26.2     01-29    146<H>  |  115<H>  |  47<H>  ----------------------------<  128<H>  3.8   |  24  |  1.51<H>    Ca    8.1<L>      29 Jan 2022 14:03                I&O's Summary          CAPILLARY BLOOD GLUCOSE      RADIOLOGY & ADDITIONAL TESTS:

## 2022-01-30 NOTE — PROGRESS NOTE ADULT - ASSESSMENT
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 Patient received 3L bolus in ED, s/p BP was still low 70/30s. Patient admitted to ICU for septic shock requiring pressors.     #Septic shock 2/2 ulcers vs UTI  #Delirium  #Acute renal failure  #Penile ulcer  #Sacral ulcer  #Diabetic foot ulcer  #Elevated INR  #Elevated LFTs

## 2022-01-30 NOTE — PROGRESS NOTE ADULT - PROBLEM SELECTOR PLAN 1
around 4.2, improving  Patient has normal creatinine at baseline, 1.18 Nov 2021  Presented with elevated BUN/cr of 51/4.45 improving  Most likely ATN from hypoperfusion   S/p 6L bolus, No more fluids for now  Will monitor UO and BMP  Nephro on board, Dr. Sharon Morel care on board, patient eligible for hospice but patient remains full code for now, daughter wants to talk to pt's son  family considering PEG tube

## 2022-01-31 LAB
ANION GAP SERPL CALC-SCNC: 6 MMOL/L — SIGNIFICANT CHANGE UP (ref 5–17)
BUN SERPL-MCNC: 34 MG/DL — HIGH (ref 7–18)
CALCIUM SERPL-MCNC: 8.2 MG/DL — LOW (ref 8.4–10.5)
CHLORIDE SERPL-SCNC: 112 MMOL/L — HIGH (ref 96–108)
CO2 SERPL-SCNC: 25 MMOL/L — SIGNIFICANT CHANGE UP (ref 22–31)
CREAT SERPL-MCNC: 1.33 MG/DL — HIGH (ref 0.5–1.3)
GLUCOSE BLDC GLUCOMTR-MCNC: 147 MG/DL — HIGH (ref 70–99)
GLUCOSE BLDC GLUCOMTR-MCNC: 148 MG/DL — HIGH (ref 70–99)
GLUCOSE BLDC GLUCOMTR-MCNC: 149 MG/DL — HIGH (ref 70–99)
GLUCOSE SERPL-MCNC: 137 MG/DL — HIGH (ref 70–99)
HCT VFR BLD CALC: 22.9 % — LOW (ref 39–50)
HGB BLD-MCNC: 7.7 G/DL — LOW (ref 13–17)
MCHC RBC-ENTMCNC: 29.3 PG — SIGNIFICANT CHANGE UP (ref 27–34)
MCHC RBC-ENTMCNC: 33.6 GM/DL — SIGNIFICANT CHANGE UP (ref 32–36)
MCV RBC AUTO: 87.1 FL — SIGNIFICANT CHANGE UP (ref 80–100)
NRBC # BLD: 0 /100 WBCS — SIGNIFICANT CHANGE UP (ref 0–0)
PLATELET # BLD AUTO: 90 K/UL — LOW (ref 150–400)
POTASSIUM SERPL-MCNC: 4 MMOL/L — SIGNIFICANT CHANGE UP (ref 3.5–5.3)
POTASSIUM SERPL-SCNC: 4 MMOL/L — SIGNIFICANT CHANGE UP (ref 3.5–5.3)
RBC # BLD: 2.63 M/UL — LOW (ref 4.2–5.8)
RBC # FLD: 17.2 % — HIGH (ref 10.3–14.5)
SODIUM SERPL-SCNC: 143 MMOL/L — SIGNIFICANT CHANGE UP (ref 135–145)
WBC # BLD: 6.14 K/UL — SIGNIFICANT CHANGE UP (ref 3.8–10.5)
WBC # FLD AUTO: 6.14 K/UL — SIGNIFICANT CHANGE UP (ref 3.8–10.5)

## 2022-01-31 PROCEDURE — 99233 SBSQ HOSP IP/OBS HIGH 50: CPT

## 2022-01-31 RX ORDER — APIXABAN 2.5 MG/1
5 TABLET, FILM COATED ORAL
Refills: 0 | Status: DISCONTINUED | OUTPATIENT
Start: 2022-01-31 | End: 2022-02-10

## 2022-01-31 RX ADMIN — SODIUM CHLORIDE 75 MILLILITER(S): 9 INJECTION, SOLUTION INTRAVENOUS at 05:55

## 2022-01-31 RX ADMIN — NYSTATIN CREAM 1 APPLICATION(S): 100000 CREAM TOPICAL at 06:45

## 2022-01-31 RX ADMIN — NYSTATIN CREAM 1 APPLICATION(S): 100000 CREAM TOPICAL at 17:09

## 2022-01-31 NOTE — PROGRESS NOTE ADULT - ASSESSMENT
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 Patient received 3L bolus in ED, s/p BP was still low 70/30s. Patient admitted to ICU for septic shock requiring pressors.     #Septic shock 2/2 ulcers vs UTI  #Delirium  #Acute renal failure  #Penile ulcer  #Sacral ulcer  #Diabetic foot ulcer  #Elevated INR  #Elevated LFTs

## 2022-01-31 NOTE — PROGRESS NOTE ADULT - ASSESSMENT
a/p  DM with circulatory complications   multiple skin lesions   diabetic ulcers /DTI  pt seen and eval   DSD   continue   offloading   xary when possible   antibiotics as per id   will re-eval in a few days    please call with any pt related questions [phone]

## 2022-01-31 NOTE — PROGRESS NOTE ADULT - SUBJECTIVE AND OBJECTIVE BOX
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 Patient received 3L bolus in ED, s/p BP was still low 70/30s. Patient admitted to ICU for septic shock requiring pressors.     INTERVAL HPI/OVERNIGHT EVENTS:   No acute events noted    REVIEW OF SYSTEMS:  CONSTITUTIONAL: No fever, weight loss, or fatigue  RESPIRATORY: No cough, wheezing, chills or hemoptysis; No shortness of breath  CARDIOVASCULAR: No chest pain, palpitations, dizziness, or leg swelling  GASTROINTESTINAL: No abdominal pain. No nausea, vomiting, or hematemesis; No diarrhea or constipation. No melena or hematochezia.  GENITOURINARY: No dysuria or hematuria, urinary frequency  NEUROLOGICAL: No headaches, memory loss, loss of strength, numbness, or tremors  SKIN: No itching, burning, rashes, or lesions     MEDICATIONS  (STANDING):  apixaban 2.5 milliGRAM(s) Oral two times a day  aspirin  chewable 81 milliGRAM(s) Oral daily  atorvastatin 40 milliGRAM(s) Oral at bedtime  dextrose 5%. 1000 milliLiter(s) (75 mL/Hr) IV Continuous <Continuous>  finasteride 5 milliGRAM(s) Oral daily  influenza  Vaccine (HIGH DOSE) 0.7 milliLiter(s) IntraMuscular once  insulin lispro (ADMELOG) corrective regimen sliding scale   SubCutaneous every 6 hours  metoprolol tartrate 12.5 milliGRAM(s) Oral every 12 hours  mirtazapine 7.5 milliGRAM(s) Oral daily  nystatin Ointment 1 Application(s) Topical two times a day  pantoprazole    Tablet 40 milliGRAM(s) Oral before breakfast  tamsulosin 0.4 milliGRAM(s) Oral at bedtime    MEDICATIONS  (PRN):  acetaminophen     Tablet .. 650 milliGRAM(s) Oral every 6 hours PRN Temp greater or equal to 38C (100.4F), Mild Pain (1 - 3)  ondansetron Injectable 4 milliGRAM(s) IV Push every 8 hours PRN Nausea and/or Vomiting      Vital Signs Last 24 Hrs  T(C): 36.7 (31 Jan 2022 05:05), Max: 36.7 (31 Jan 2022 05:05)  T(F): 98.1 (31 Jan 2022 05:05), Max: 98.1 (31 Jan 2022 05:05)  HR: 94 (31 Jan 2022 05:05) (94 - 97)  BP: 125/67 (31 Jan 2022 05:05) (125/67 - 127/64)  BP(mean): --  RR: 18 (31 Jan 2022 05:05) (17 - 18)  SpO2: 99% (31 Jan 2022 05:05) (99% - 99%)    PHYSICAL EXAMINATION:  General: No acute distress  HEENT: EOMI, PERRL  Neck: Supple, [ ] JVD  Lungs: decreased breath sounds   Heart: Regular rate and rhythm  Abdomen: Nontender, bowel sounds present  Extremities: No clubbing, cyanosis, edema  Nervous system:  Alert & Oriented X3, no focal deficits  Psychiatric: Normal affect  Skin: No rashes or lesions                          7.7    6.14  )-----------( 90       ( 31 Jan 2022 07:03 )             22.9     01-31    143  |  112<H>  |  34<H>  ----------------------------<  137<H>  4.0   |  25  |  1.33<H>    Ca    8.2<L>      31 Jan 2022 07:03                I&O's Summary    30 Jan 2022 07:01  -  31 Jan 2022 07:00  --------------------------------------------------------  IN: 0 mL / OUT: 1900 mL / NET: -1900 mL            CAPILLARY BLOOD GLUCOSE      RADIOLOGY & ADDITIONAL TESTS:

## 2022-01-31 NOTE — PROGRESS NOTE ADULT - SUBJECTIVE AND OBJECTIVE BOX
follow up on:  complex medical decision making related to goals of care    OVERNIGHT EVENTS: on isolation now for COVID exposure, remains lethargic    Present Symptoms:      Review of Systems:   Unable to obtain due to poor mentation     MEDICATIONS  (STANDING):  apixaban 2.5 milliGRAM(s) Oral two times a day  aspirin  chewable 81 milliGRAM(s) Oral daily  atorvastatin 40 milliGRAM(s) Oral at bedtime  dextrose 5%. 1000 milliLiter(s) (75 mL/Hr) IV Continuous <Continuous>  finasteride 5 milliGRAM(s) Oral daily  influenza  Vaccine (HIGH DOSE) 0.7 milliLiter(s) IntraMuscular once  insulin lispro (ADMELOG) corrective regimen sliding scale   SubCutaneous every 6 hours  metoprolol tartrate 12.5 milliGRAM(s) Oral every 12 hours  mirtazapine 7.5 milliGRAM(s) Oral daily  nystatin Ointment 1 Application(s) Topical two times a day  pantoprazole    Tablet 40 milliGRAM(s) Oral before breakfast  tamsulosin 0.4 milliGRAM(s) Oral at bedtime    MEDICATIONS  (PRN):  acetaminophen     Tablet .. 650 milliGRAM(s) Oral every 6 hours PRN Temp greater or equal to 38C (100.4F), Mild Pain (1 - 3)  ondansetron Injectable 4 milliGRAM(s) IV Push every 8 hours PRN Nausea and/or Vomiting      PHYSICAL EXAM:  Vital Signs Last 24 Hrs  T(C): 36.4 (31 Jan 2022 12:30), Max: 36.7 (31 Jan 2022 05:05)  T(F): 97.5 (31 Jan 2022 12:30), Max: 98.1 (31 Jan 2022 05:05)  HR: 91 (31 Jan 2022 12:30) (91 - 97)  BP: 117/65 (31 Jan 2022 12:30) (117/65 - 127/64)  BP(mean): --  RR: 20 (31 Jan 2022 12:30) (17 - 20)  SpO2: 99% (31 Jan 2022 12:30) (99% - 99%)    Palliative Performance Scale/Karnofsky Score: 20      GENERAL:   lethargic,  cachectic,  NAD  HEENT: Atraumatic, oropharynx clear, neck supple  CHEST/LUNG: unlabored  HEART: Regular rate and rhythm    ABDOMEN: Soft, Nontender, Nondistended   MUSCULOSKELETAL:  No  edema,  bedbound  NERVOUS SYSTEM:    lethargic, opens eyes, not following commands  SKIN: LE ulcer, St IV sacral decub noted  Oral intake: npo    LABS:                          7.7    6.14  )-----------( 90       ( 31 Jan 2022 07:03 )             22.9     01-31    143  |  112<H>  |  34<H>  ----------------------------<  137<H>  4.0   |  25  |  1.33<H>    Ca    8.2<L>      31 Jan 2022 07:03          RADIOLOGY & ADDITIONAL STUDIES:

## 2022-01-31 NOTE — PROGRESS NOTE ADULT - ASSESSMENT
1. OREN due to ATN.  -scr is improving with renal recovery and stable electrolytes.   -urinalysis shows granular casts. urine lytes not done.   -Adjust meds to eGFR and avoid IV Gadolinium contrast,NSAIDs, and phosphate enema.  -Monitor I/O's daily.   -Monitor SMA daily.  2. CKD stage 3 most likely due to diabetic nephropathy  -Baseline Scr around 1.2mg/dL  -Keep patient euvolemic and renal diet  -Avoid Nephrotoxic Meds/ Agents such as (NSAIDs, IV contrast, Aminoglycosides such as gentamicin, -Gadolinium contrast, Phosphate containing enemas, etc..)  -Adjust Medications according to eGFR  3. Anemia: hb is stable. monitor CBC  4. Hypotension: bp is stable.   5. MBD: phos is okay.   6. Hyponatremia due to lack of free water intake.  -Na improving and continue D5W at 75cc/hr since poor oral intake. monitor Na.  7. Poor oral intake: awaiting SLP and possible PEG if failed SLP      Discussed the assessment and plan with Primary Team/Nurse

## 2022-01-31 NOTE — PROGRESS NOTE ADULT - SUBJECTIVE AND OBJECTIVE BOX
76y Male is under our care for     REVIEW OF SYSTEMS:  [  ] Not able to elicit  General:	  Chest:	  GI:	  :  Skin:	  Musculoskeletal:	  Neuro:	    MEDS:    ALLERGIES: Allergies    No Known Allergies    Intolerances        VITALS:  Vital Signs Last 24 Hrs  T(C): 36.7 (31 Jan 2022 05:05), Max: 36.7 (31 Jan 2022 05:05)  T(F): 98.1 (31 Jan 2022 05:05), Max: 98.1 (31 Jan 2022 05:05)  HR: 94 (31 Jan 2022 05:05) (94 - 97)  BP: 125/67 (31 Jan 2022 05:05) (125/67 - 127/64)  BP(mean): --  RR: 18 (31 Jan 2022 05:05) (16 - 18)  SpO2: 99% (31 Jan 2022 05:05) (99% - 99%)      PHYSICAL EXAM:  HEENT:  Neck:  Respiratory:  Cardiovascular:  Gastrointestinal:  Extremities:  Skin:  Ortho:  Neuro:    LABS/DIAGNOSTIC TESTS:                        7.7    6.14  )-----------( 90       ( 31 Jan 2022 07:03 )             22.9     WBC Count: 6.14 K/uL (01-31 @ 07:03)  WBC Count: 8.47 K/uL (01-30 @ 07:14)  WBC Count: 12.71 K/uL (01-29 @ 14:03)  WBC Count: 15.96 K/uL (01-28 @ 06:43)  WBC Count: 19.99 K/uL (01-27 @ 05:55)    01-31    143  |  112<H>  |  34<H>  ----------------------------<  137<H>  4.0   |  25  |  1.33<H>    Ca    8.2<L>      31 Jan 2022 07:03        CULTURES:   Catheterized Catheterized  01-19 @ 11:06   10,000 - 49,000 CFU/mL Yeast like cells "Susceptibilities not performed"  --  --      .Blood Blood-Peripheral  01-17 @ 17:49   No Growth Final  --  --      .Surgical Swab left foot wound  11-02 @ 13:15   Few Staphylococcus epidermidis  "Susceptibilities not performed"  --  --        RADIOLOGY:  no new studies 76y Male is under our care for leukocytosis.  Patient was seen laying comfortably in bed with no acute distress.  He is lethargic and shaking his head continuously when asked any question.  Patient remains afebrile and WBC count has normalized.    REVIEW OF SYSTEMS:  [ x ] Not able to elicit      MEDS:    ALLERGIES: Allergies    No Known Allergies    Intolerances        VITALS:  Vital Signs Last 24 Hrs  T(C): 36.7 (31 Jan 2022 05:05), Max: 36.7 (31 Jan 2022 05:05)  T(F): 98.1 (31 Jan 2022 05:05), Max: 98.1 (31 Jan 2022 05:05)  HR: 94 (31 Jan 2022 05:05) (94 - 97)  BP: 125/67 (31 Jan 2022 05:05) (125/67 - 127/64)  BP(mean): --  RR: 18 (31 Jan 2022 05:05) (16 - 18)  SpO2: 99% (31 Jan 2022 05:05) (99% - 99%)      PHYSICAL EXAM:  HEENT: n/a  Neck: supple  Respiratory: lungs clear no rales  Cardiovascular: S1 S2 reg no murmurs  Gastrointestinal: +BS with soft, nondistended abdomen; nontender  : Molina catheter in place with mildly cloudy urine  Extremities: no edema  Skin: Bilateral feet necrotic ulcers  Ortho: n/a  Neuro: Awake and alert    LABS/DIAGNOSTIC TESTS:                        7.7    6.14  )-----------( 90       ( 31 Jan 2022 07:03 )             22.9     WBC Count: 6.14 K/uL (01-31 @ 07:03)  WBC Count: 8.47 K/uL (01-30 @ 07:14)  WBC Count: 12.71 K/uL (01-29 @ 14:03)  WBC Count: 15.96 K/uL (01-28 @ 06:43)  WBC Count: 19.99 K/uL (01-27 @ 05:55)    01-31    143  |  112<H>  |  34<H>  ----------------------------<  137<H>  4.0   |  25  |  1.33<H>    Ca    8.2<L>      31 Jan 2022 07:03        CULTURES:   Catheterized Catheterized  01-19 @ 11:06   10,000 - 49,000 CFU/mL Yeast like cells "Susceptibilities not performed"  --  --      .Blood Blood-Peripheral  01-17 @ 17:49   No Growth Final  --  --      .Surgical Swab left foot wound  11-02 @ 13:15   Few Staphylococcus epidermidis  "Susceptibilities not performed"  --  --        RADIOLOGY:  no new studies

## 2022-01-31 NOTE — PROGRESS NOTE ADULT - SUBJECTIVE AND OBJECTIVE BOX
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 Patient is alert and oriented on encounter. Patient states that he cut his foot and his back was hurting. Patient is unable to provide much history due to discomfort. Patient has stage 3 ulcers on sacrum, penile ulcer. Also has chronic wounds on foot. He was found to be hypotensive on field 60/30. He was given 3L bolus in ED after which BP was still low 70/30. Patient denies fever. As per AL records no h/o abdominal pain, nausea, vomiting, diarrhea, cough.  (17 Jan 2022 15:23)      ICU Vital Signs Last 24 Hrs  T(C): 36.4 (31 Jan 2022 12:30), Max: 36.7 (31 Jan 2022 05:05)  T(F): 97.5 (31 Jan 2022 12:30), Max: 98.1 (31 Jan 2022 05:05)  HR: 91 (31 Jan 2022 12:30) (91 - 97)  BP: 117/65 (31 Jan 2022 12:30) (117/65 - 127/64)  BP(mean): --  ABP: --  ABP(mean): --  RR: 20 (31 Jan 2022 12:30) (17 - 20)  SpO2: 99% (31 Jan 2022 12:30) (99% - 99%)      Vital Signs Last 24 Hrs  T(C): 36.4 (31 Jan 2022 12:30), Max: 36.7 (31 Jan 2022 05:05)  T(F): 97.5 (31 Jan 2022 12:30), Max: 98.1 (31 Jan 2022 05:05)  HR: 91 (31 Jan 2022 12:30) (91 - 97)  BP: 117/65 (31 Jan 2022 12:30) (117/65 - 127/64)  BP(mean): --  RR: 20 (31 Jan 2022 12:30) (17 - 20)  SpO2: 99% (31 Jan 2022 12:30) (99% - 99%)                        7.7    6.14  )-----------( 90       ( 31 Jan 2022 07:03 )             22.9     01-31    143  |  112<H>  |  34<H>  ----------------------------<  137<H>  4.0   |  25  |  1.33<H>    Ca    8.2<L>      31 Jan 2022 07:03          CAPILLARY BLOOD GLUCOSE      POCT Blood Glucose.: 148 mg/dL (31 Jan 2022 16:29)  POCT Blood Glucose.: 149 mg/dL (31 Jan 2022 11:29)  POCT Blood Glucose.: 147 mg/dL (31 Jan 2022 05:58)  POCT Blood Glucose.: 129 mg/dL (30 Jan 2022 23:57)      MEDICATIONS  (STANDING):  apixaban 5 milliGRAM(s) Oral two times a day  aspirin  chewable 81 milliGRAM(s) Oral daily  atorvastatin 40 milliGRAM(s) Oral at bedtime  dextrose 5%. 1000 milliLiter(s) (75 mL/Hr) IV Continuous <Continuous>  finasteride 5 milliGRAM(s) Oral daily  influenza  Vaccine (HIGH DOSE) 0.7 milliLiter(s) IntraMuscular once  insulin lispro (ADMELOG) corrective regimen sliding scale   SubCutaneous every 6 hours  metoprolol tartrate 12.5 milliGRAM(s) Oral every 12 hours  mirtazapine 7.5 milliGRAM(s) Oral daily  nystatin Ointment 1 Application(s) Topical two times a day  pantoprazole    Tablet 40 milliGRAM(s) Oral before breakfast  tamsulosin 0.4 milliGRAM(s) Oral at bedtime    MEDICATIONS  (PRN):  acetaminophen     Tablet .. 650 milliGRAM(s) Oral every 6 hours PRN Temp greater or equal to 38C (100.4F), Mild Pain (1 - 3)  ondansetron Injectable 4 milliGRAM(s) IV Push every 8 hours PRN Nausea and/or Vomiting    Allergies    No Known Allergies    Intolerances      PAST MEDICAL & SURGICAL HISTORY:  HTN (hypertension)    HLD (hyperlipidemia)    DM (diabetes mellitus)    CAD (coronary artery disease)    Glaucoma, angle-closure    [place] (hard of hearing)    S/P CABG (coronary artery bypass graft)    History of thyroid surgery      Social History:     HPI:  75 yo male from [facility] assisted living with medical history of DM on insulin, HTN, HLD, CAD S/p CABG, 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 Patient is alert and oriented on encounter. Patient states that he cut his foot and his back was hurting. Patient is unable to provide much history due to discomfort. Patient has stage 3 ulcers on sacrum, penile ulcer. Also has chronic wounds on foot. He was found to be hypotensive on field 60/30. He was given 3L bolus in ED after which BP was still low 70/30. Patient denies fever. As per AL records no h/o abdominal pain, nausea, vomiting, diarrhea, cough.  (17 Jan 2022 15:23)      ICU Vital Signs Last 24 Hrs  T(C): 36.4 (31 Jan 2022 12:30), Max: 36.7 (31 Jan 2022 05:05)  T(F): 97.5 (31 Jan 2022 12:30), Max: 98.1 (31 Jan 2022 05:05)  HR: 91 (31 Jan 2022 12:30) (91 - 97)  BP: 117/65 (31 Jan 2022 12:30) (117/65 - 127/64)  BP(mean): --  ABP: --  ABP(mean): --  RR: 20 (31 Jan 2022 12:30) (17 - 20)  SpO2: 99% (31 Jan 2022 12:30) (99% - 99%)      Vital Signs Last 24 Hrs  T(C): 36.4 (31 Jan 2022 12:30), Max: 36.7 (31 Jan 2022 05:05)  T(F): 97.5 (31 Jan 2022 12:30), Max: 98.1 (31 Jan 2022 05:05)  HR: 91 (31 Jan 2022 12:30) (91 - 97)  BP: 117/65 (31 Jan 2022 12:30) (117/65 - 127/64)  BP(mean): --  RR: 20 (31 Jan 2022 12:30) (17 - 20)  SpO2: 99% (31 Jan 2022 12:30) (99% - 99%)                        7.7    6.14  )-----------( 90       ( 31 Jan 2022 07:03 )             22.9     01-31    143  |  112<H>  |  34<H>  ----------------------------<  137<H>  4.0   |  25  |  1.33<H>    Ca    8.2<L>      31 Jan 2022 07:03          CAPILLARY BLOOD GLUCOSE      POCT Blood Glucose.: 148 mg/dL (31 Jan 2022 16:29)  POCT Blood Glucose.: 149 mg/dL (31 Jan 2022 11:29)  POCT Blood Glucose.: 147 mg/dL (31 Jan 2022 05:58)  POCT Blood Glucose.: 129 mg/dL (30 Jan 2022 23:57)      MEDICATIONS  (STANDING):  apixaban 5 milliGRAM(s) Oral two times a day  aspirin  chewable 81 milliGRAM(s) Oral daily  atorvastatin 40 milliGRAM(s) Oral at bedtime  dextrose 5%. 1000 milliLiter(s) (75 mL/Hr) IV Continuous <Continuous>  finasteride 5 milliGRAM(s) Oral daily  influenza  Vaccine (HIGH DOSE) 0.7 milliLiter(s) IntraMuscular once  insulin lispro (ADMELOG) corrective regimen sliding scale   SubCutaneous every 6 hours  metoprolol tartrate 12.5 milliGRAM(s) Oral every 12 hours  mirtazapine 7.5 milliGRAM(s) Oral daily  nystatin Ointment 1 Application(s) Topical two times a day  pantoprazole    Tablet 40 milliGRAM(s) Oral before breakfast  tamsulosin 0.4 milliGRAM(s) Oral at bedtime    MEDICATIONS  (PRN):  acetaminophen     Tablet .. 650 milliGRAM(s) Oral every 6 hours PRN Temp greater or equal to 38C (100.4F), Mild Pain (1 - 3)  ondansetron Injectable 4 milliGRAM(s) IV Push every 8 hours PRN Nausea and/or Vomiting    Allergies    No Known Allergies    Intolerances      PAST MEDICAL & SURGICAL HISTORY:  HTN (hypertension)    HLD (hyperlipidemia)    DM (diabetes mellitus)    CAD (coronary artery disease)    Glaucoma, angle-closure    [place] (hard of hearing)    S/P CABG (coronary artery bypass graft)    History of thyroid surgery      Social History:        pt seen at bedside   lethargic   vasc:   DP -fainted   TG-increased   CRF: 2 sec   neuro:   N/A  Derm:   multiple lesions mostly right foot   covered with dry dark skin   no drainage   b/l heel     DTI  NO PEDAL hair growth     skin is shiny   dry   discolorations ++  MS;  N/A     HPI:  77 yo male from [facility] assisted living with medical history of DM on insulin, HTN, HLD, CAD S/p CABG, 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 Patient is alert and oriented on encounter. Patient states that he cut his foot and his back was hurting. Patient is unable to provide much history due to discomfort. Patient has stage 3 ulcers on sacrum, penile ulcer. Also has chronic wounds on foot. He was found to be hypotensive on field 60/30. He was given 3L bolus in ED after which BP was still low 70/30. Patient denies fever. As per AL records no h/o abdominal pain, nausea, vomiting, diarrhea, cough.  (17 Jan 2022 15:23)      Vital Signs Last 24 Hrs  T(C): 36.4 (31 Jan 2022 12:30), Max: 36.7 (31 Jan 2022 05:05)  T(F): 97.5 (31 Jan 2022 12:30), Max: 98.1 (31 Jan 2022 05:05)  HR: 91 (31 Jan 2022 12:30) (91 - 97)  BP: 117/65 (31 Jan 2022 12:30) (117/65 - 127/64)  BP(mean): --  RR: 20 (31 Jan 2022 12:30) (17 - 20)  SpO2: 99% (31 Jan 2022 12:30) (99% - 99%)                        7.7    6.14  )-----------( 90       ( 31 Jan 2022 07:03 )             22.9     01-31    143  |  112<H>  |  34<H>  ----------------------------<  137<H>  4.0   |  25  |  1.33<H>    Ca    8.2<L>      31 Jan 2022 07:03          CAPILLARY BLOOD GLUCOSE      POCT Blood Glucose.: 148 mg/dL (31 Jan 2022 16:29)  POCT Blood Glucose.: 149 mg/dL (31 Jan 2022 11:29)  POCT Blood Glucose.: 147 mg/dL (31 Jan 2022 05:58)  POCT Blood Glucose.: 129 mg/dL (30 Jan 2022 23:57)      MEDICATIONS  (STANDING):  apixaban 5 milliGRAM(s) Oral two times a day  aspirin  chewable 81 milliGRAM(s) Oral daily  atorvastatin 40 milliGRAM(s) Oral at bedtime  dextrose 5%. 1000 milliLiter(s) (75 mL/Hr) IV Continuous <Continuous>  finasteride 5 milliGRAM(s) Oral daily  influenza  Vaccine (HIGH DOSE) 0.7 milliLiter(s) IntraMuscular once  insulin lispro (ADMELOG) corrective regimen sliding scale   SubCutaneous every 6 hours  metoprolol tartrate 12.5 milliGRAM(s) Oral every 12 hours  mirtazapine 7.5 milliGRAM(s) Oral daily  nystatin Ointment 1 Application(s) Topical two times a day  pantoprazole    Tablet 40 milliGRAM(s) Oral before breakfast  tamsulosin 0.4 milliGRAM(s) Oral at bedtime    MEDICATIONS  (PRN):  acetaminophen     Tablet .. 650 milliGRAM(s) Oral every 6 hours PRN Temp greater or equal to 38C (100.4F), Mild Pain (1 - 3)  ondansetron Injectable 4 milliGRAM(s) IV Push every 8 hours PRN Nausea and/or Vomiting    Allergies    No Known Allergies    Intolerances      PAST MEDICAL & SURGICAL HISTORY:  HTN (hypertension)    HLD (hyperlipidemia)    DM (diabetes mellitus)    CAD (coronary artery disease)    Glaucoma, angle-closure    [place] (hard of hearing)    S/P CABG (coronary artery bypass graft)    History of thyroid surgery      Social History:        pt seen at bedside   lethargic   vasc:   DP -fainted   TG-increased   CRF: 2 sec   neuro:   N/A  Derm:   multiple lesions mostly right foot   covered with dry dark skin   no drainage   b/l heel     DTI  NO PEDAL hair growth     skin is shiny   dry   discolorations ++  MS;  N/A

## 2022-01-31 NOTE — PROGRESS NOTE ADULT - PROBLEM SELECTOR PLAN 1
usually A&Ox3 at baseline, w superimposed delirium likely 2/2 acute illness, hospitalization. 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 Remains lethargic. Hospice eligible.

## 2022-01-31 NOTE — PROGRESS NOTE ADULT - SUBJECTIVE AND OBJECTIVE BOX
NEPHROLOGY MEDICAL CARE, [facility] - Dr. Tariq Herrera/ Dr. Lauren Lopez/ Dr. Dre Call/ Dr. Destiny Knott    Patient was seen and examined at bedside.    CC: patient is okay and not eating much.    Vital Signs Last 24 Hrs  T(C): 36.7 (31 Jan 2022 05:05), Max: 36.7 (31 Jan 2022 05:05)  T(F): 98.1 (31 Jan 2022 05:05), Max: 98.1 (31 Jan 2022 05:05)  HR: 94 (31 Jan 2022 05:05) (94 - 97)  BP: 125/67 (31 Jan 2022 05:05) (125/67 - 127/64)  BP(mean): --  RR: 18 (31 Jan 2022 05:05) (17 - 18)  SpO2: 99% (31 Jan 2022 05:05) (99% - 99%)    01-30 @ 07:01  -  01-31 @ 07:00  --------------------------------------------------------  IN: 0 mL / OUT: 1900 mL / NET: -1900 mL        PHYSICAL EXAM:  General: No acute respiratory distress.  Eyes: conjunctiva and sclera clear  ENMT: Atraumatic, Normocephalic, supple, No JVD present. Moist mucous membranes  Respiratory: Bilateral poor air entry; No rales, rhonchi, wheezing  Cardiovassular: S1S2+; no m/r/g  Gastrointestinal: Soft, Non-tender, Nondistended; Bowel sounds present  : ibrahim's cath with brown colored urine  Neuro:  Awake but confused..   Ext: trace edema  Skin: No visible rashes    MEDICATIONS:  MEDICATIONS  (STANDING):  apixaban 2.5 milliGRAM(s) Oral two times a day  aspirin  chewable 81 milliGRAM(s) Oral daily  atorvastatin 40 milliGRAM(s) Oral at bedtime  dextrose 5%. 1000 milliLiter(s) (75 mL/Hr) IV Continuous <Continuous>  finasteride 5 milliGRAM(s) Oral daily  influenza  Vaccine (HIGH DOSE) 0.7 milliLiter(s) IntraMuscular once  insulin lispro (ADMELOG) corrective regimen sliding scale   SubCutaneous every 6 hours  metoprolol tartrate 12.5 milliGRAM(s) Oral every 12 hours  mirtazapine 7.5 milliGRAM(s) Oral daily  nystatin Ointment 1 Application(s) Topical two times a day  pantoprazole    Tablet 40 milliGRAM(s) Oral before breakfast  tamsulosin 0.4 milliGRAM(s) Oral at bedtime    MEDICATIONS  (PRN):  acetaminophen     Tablet .. 650 milliGRAM(s) Oral every 6 hours PRN Temp greater or equal to 38C (100.4F), Mild Pain (1 - 3)  ondansetron Injectable 4 milliGRAM(s) IV Push every 8 hours PRN Nausea and/or Vomiting          LABS:                        7.7    6.14  )-----------( 90       ( 31 Jan 2022 07:03 )             22.9     01-31    143  |  112<H>  |  34<H>  ----------------------------<  137<H>  4.0   |  25  |  1.33<H>    Ca    8.2<L>      31 Jan 2022 07:03            Urine studies    PTH and Vit D:

## 2022-01-31 NOTE — PROGRESS NOTE ADULT - ASSESSMENT
Leukocytosis - resolved    Plan:  ·	remain off antibiotics monitoring for now                                 Leukocytosis - resolved    Plan:  ·	remain off antibiotics, no need for any antibiotics at this time  ·	Reconsult prn                                 Leukocytosis - resolved    Plan:  ·	remain off antibiotics, no need for any antibiotics at this time  ·	Reconsult prn    I agree with above

## 2022-02-01 LAB
ANION GAP SERPL CALC-SCNC: 8 MMOL/L — SIGNIFICANT CHANGE UP (ref 5–17)
APPEARANCE UR: CLEAR — SIGNIFICANT CHANGE UP
BACTERIA # UR AUTO: ABNORMAL /HPF
BILIRUB UR-MCNC: NEGATIVE — SIGNIFICANT CHANGE UP
BUN SERPL-MCNC: 27 MG/DL — HIGH (ref 7–18)
CALCIUM SERPL-MCNC: 7.7 MG/DL — LOW (ref 8.4–10.5)
CHLORIDE SERPL-SCNC: 106 MMOL/L — SIGNIFICANT CHANGE UP (ref 96–108)
CO2 SERPL-SCNC: 24 MMOL/L — SIGNIFICANT CHANGE UP (ref 22–31)
COLOR SPEC: YELLOW — SIGNIFICANT CHANGE UP
COMMENT - URINE: SIGNIFICANT CHANGE UP
CREAT SERPL-MCNC: 1.21 MG/DL — SIGNIFICANT CHANGE UP (ref 0.5–1.3)
DIFF PNL FLD: ABNORMAL
EPI CELLS # UR: SIGNIFICANT CHANGE UP /HPF
GLUCOSE BLDC GLUCOMTR-MCNC: 133 MG/DL — HIGH (ref 70–99)
GLUCOSE BLDC GLUCOMTR-MCNC: 134 MG/DL — HIGH (ref 70–99)
GLUCOSE BLDC GLUCOMTR-MCNC: 147 MG/DL — HIGH (ref 70–99)
GLUCOSE BLDC GLUCOMTR-MCNC: 153 MG/DL — HIGH (ref 70–99)
GLUCOSE SERPL-MCNC: 131 MG/DL — HIGH (ref 70–99)
GLUCOSE UR QL: NEGATIVE — SIGNIFICANT CHANGE UP
HCT VFR BLD CALC: 21.8 % — LOW (ref 39–50)
HGB BLD-MCNC: 7.3 G/DL — LOW (ref 13–17)
KETONES UR-MCNC: NEGATIVE — SIGNIFICANT CHANGE UP
LEUKOCYTE ESTERASE UR-ACNC: ABNORMAL
MCHC RBC-ENTMCNC: 29.1 PG — SIGNIFICANT CHANGE UP (ref 27–34)
MCHC RBC-ENTMCNC: 33.5 GM/DL — SIGNIFICANT CHANGE UP (ref 32–36)
MCV RBC AUTO: 86.9 FL — SIGNIFICANT CHANGE UP (ref 80–100)
NITRITE UR-MCNC: NEGATIVE — SIGNIFICANT CHANGE UP
NRBC # BLD: 0 /100 WBCS — SIGNIFICANT CHANGE UP (ref 0–0)
PH UR: 6 — SIGNIFICANT CHANGE UP (ref 5–8)
PLATELET # BLD AUTO: 97 K/UL — LOW (ref 150–400)
POTASSIUM SERPL-MCNC: 3 MMOL/L — LOW (ref 3.5–5.3)
POTASSIUM SERPL-SCNC: 3 MMOL/L — LOW (ref 3.5–5.3)
PROT UR-MCNC: 30 MG/DL
RBC # BLD: 2.51 M/UL — LOW (ref 4.2–5.8)
RBC # FLD: 16.5 % — HIGH (ref 10.3–14.5)
RBC CASTS # UR COMP ASSIST: ABNORMAL /HPF (ref 0–2)
SODIUM SERPL-SCNC: 138 MMOL/L — SIGNIFICANT CHANGE UP (ref 135–145)
SP GR SPEC: 1.01 — SIGNIFICANT CHANGE UP (ref 1.01–1.02)
UROBILINOGEN FLD QL: NEGATIVE — SIGNIFICANT CHANGE UP
WBC # BLD: 5.36 K/UL — SIGNIFICANT CHANGE UP (ref 3.8–10.5)
WBC # FLD AUTO: 5.36 K/UL — SIGNIFICANT CHANGE UP (ref 3.8–10.5)
WBC UR QL: >50 /HPF (ref 0–5)

## 2022-02-01 RX ORDER — COLLAGENASE CLOSTRIDIUM HIST. 250 UNIT/G
1 OINTMENT (GRAM) TOPICAL DAILY
Refills: 0 | Status: DISCONTINUED | OUTPATIENT
Start: 2022-02-01 | End: 2022-02-10

## 2022-02-01 RX ORDER — POTASSIUM CHLORIDE 20 MEQ
10 PACKET (EA) ORAL
Refills: 0 | Status: COMPLETED | OUTPATIENT
Start: 2022-02-01 | End: 2022-02-01

## 2022-02-01 RX ADMIN — Medication 100 MILLIEQUIVALENT(S): at 11:11

## 2022-02-01 RX ADMIN — SODIUM CHLORIDE 75 MILLILITER(S): 9 INJECTION, SOLUTION INTRAVENOUS at 22:02

## 2022-02-01 RX ADMIN — Medication 100 MILLIEQUIVALENT(S): at 09:49

## 2022-02-01 RX ADMIN — Medication 100 MILLIEQUIVALENT(S): at 12:18

## 2022-02-01 NOTE — ADVANCED PRACTICE NURSE CONSULT - ASSESSMENT
This is a 76yr old male patient admitted for Acute Renal Failure, presenting with the following:  -There is evidence of Arterial Ulcers to the Bilateral Lower Extremities, to which the patient is being followed by Podiatry with a treatment plan in place to address these issues  -There is an Unstageable Pressure Injury to the Sacrococcyx area with slough at the center, red tissue, drainage, and periwound maceration  -There is a L. Hip Stage 3 Pressure Injury with pale tissue and drainage  -There is evidence of healing Scrotal epidermal erosion with pink tissue, drainage, and periwound maceration

## 2022-02-01 NOTE — PROGRESS NOTE ADULT - SUBJECTIVE AND OBJECTIVE BOX
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 Patient received 3L bolus in ED, s/p BP was still low 70/30s. Patient admitted to ICU for septic shock requiring pressors.     INTERVAL HPI/OVERNIGHT EVENTS:   Pt refusing SLP eval this afternoon    REVIEW OF SYSTEMS:  CONSTITUTIONAL: No fever, weight loss, or fatigue  RESPIRATORY: No cough, wheezing, chills or hemoptysis; No shortness of breath  CARDIOVASCULAR: No chest pain, palpitations, dizziness, or leg swelling  GASTROINTESTINAL: No abdominal pain. No nausea, vomiting, or hematemesis; No diarrhea or constipation. No melena or hematochezia.  GENITOURINARY: No dysuria or hematuria, urinary frequency  NEUROLOGICAL: No headaches, memory loss, loss of strength, numbness, or tremors  SKIN: No itching, burning, rashes, or lesions     MEDICATIONS  (STANDING):  apixaban 5 milliGRAM(s) Oral two times a day  aspirin  chewable 81 milliGRAM(s) Oral daily  atorvastatin 40 milliGRAM(s) Oral at bedtime  dextrose 5%. 1000 milliLiter(s) (75 mL/Hr) IV Continuous <Continuous>  finasteride 5 milliGRAM(s) Oral daily  influenza  Vaccine (HIGH DOSE) 0.7 milliLiter(s) IntraMuscular once  insulin lispro (ADMELOG) corrective regimen sliding scale   SubCutaneous every 6 hours  metoprolol tartrate 12.5 milliGRAM(s) Oral every 12 hours  mirtazapine 7.5 milliGRAM(s) Oral daily  nystatin Ointment 1 Application(s) Topical two times a day  pantoprazole    Tablet 40 milliGRAM(s) Oral before breakfast  tamsulosin 0.4 milliGRAM(s) Oral at bedtime    MEDICATIONS  (PRN):  acetaminophen     Tablet .. 650 milliGRAM(s) Oral every 6 hours PRN Temp greater or equal to 38C (100.4F), Mild Pain (1 - 3)  ondansetron Injectable 4 milliGRAM(s) IV Push every 8 hours PRN Nausea and/or Vomiting      Vital Signs Last 24 Hrs  T(C): 36.6 (01 Feb 2022 12:00), Max: 36.7 (01 Feb 2022 05:14)  T(F): 97.8 (01 Feb 2022 12:00), Max: 98 (01 Feb 2022 05:14)  HR: 86 (01 Feb 2022 12:00) (86 - 94)  BP: 113/62 (01 Feb 2022 12:00) (113/62 - 131/67)  BP(mean): --  RR: 18 (01 Feb 2022 12:00) (17 - 18)  SpO2: 98% (01 Feb 2022 12:00) (97% - 99%)    PHYSICAL EXAMINATION:  General: No acute distress  HEENT: EOMI, PERRL  Neck: Supple, [ ] JVD  Lungs: decreased breath sounds   Heart: Regular rate and rhythm  Abdomen: Nontender, bowel sounds present  Extremities: No clubbing, cyanosis, edema  Nervous system:  Alert & Oriented X3, no focal deficits  Psychiatric: Normal affect  Skin: No rashes or lesions                 7.3    5.36  )-----------( 97       ( 01 Feb 2022 07:35 )             21.8     02-01    138  |  106  |  27<H>  ----------------------------<  131<H>  3.0<L>   |  24  |  1.21    Ca    7.7<L>      01 Feb 2022 07:35                I&O's Summary    31 Jan 2022 07:01  -  01 Feb 2022 07:00  --------------------------------------------------------  IN: 0 mL / OUT: 500 mL / NET: -500 mL    01 Feb 2022 07:01  -  01 Feb 2022 14:40  --------------------------------------------------------  IN: 75 mL / OUT: 100 mL / NET: -25 mL            CAPILLARY BLOOD GLUCOSE      RADIOLOGY & ADDITIONAL TESTS:

## 2022-02-01 NOTE — CONSULT NOTE ADULT - ASSESSMENT
1. Dysphagia  2. Weight loss  3. Failure to thrive    Suggestions:    1. NPO  2. Palliative evaluation  3. IVF hydration  4. Protonix daily  5. Avoid NSAID  6. Schedule for Peg placement after palliative evaluation  7. DVT prophylaxis

## 2022-02-01 NOTE — CONSULT NOTE ADULT - CONSULT REASON
lesions lower extremity
Septic shock/ infected ulcers
suzanna on ckd
Dysphagia for Peg tube placement
Discuss complex medical decision making related to goals of care due to advanced dementia

## 2022-02-01 NOTE — PROGRESS NOTE ADULT - PROBLEM SELECTOR PLAN 1
around 4.2, improving  Patient has normal creatinine at baseline, 1.18 Nov 2021  Presented with elevated BUN/cr of 51/4.45 improving  Most likely ATN from hypoperfusion   S/p 6L bolus, No more fluids for now  Will monitor UO and BMP  Nephro on board, Dr. Herrera    Pall care on board, patient eligible for hospice but patient remains full code for now, daughter wants to talk to pt's son  family considering PEG tube  GI consulted Dr Viera for PEG tube eval    Daughter and son continue to express patient code status as full code

## 2022-02-01 NOTE — ADVANCED PRACTICE NURSE CONSULT - RECOMMEDATIONS
-Clean all wounds with normal saline and apply skin prep to the surrounding skin  -Apply Collagenase ointment to the slough areas of the Sacrococcyx wound, apply saline moistened gauze to the wound bed, and apply a Foam dressing Daily PRN  -Apply Calcium Alginate (aquacel) to the L. Hip wound and cover with a Foam dressing Q 72hrs PRN  -Apply Zinc Oxide Moisture Barrier Cream to the Scrotal area b.i.d. PRN  -Frequent toileting  -Elevate/float the patients heels using heel protectors and reposition the patient Q 2hrs using wedges or pillows

## 2022-02-01 NOTE — CONSULT NOTE ADULT - RS GEN PE MLT RESP DETAILS PC
airway patent/breath sounds equal/good air movement/respirations non-labored/no rales/no rhonchi/no wheezes
breath sounds equal/good air movement/clear to auscultation bilaterally/no rales/no rhonchi/no wheezes

## 2022-02-01 NOTE — CONSULT NOTE ADULT - SUBJECTIVE AND OBJECTIVE BOX
[  ] STAT REQUEST              [  ]ROUTINE REQUEST    Patient is a 76 year old male with dysphagia, weight loss and failure to thrive. GI consulted for Peg tube placement.    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 Patient is alert and oriented on encounter. Patient states that he cut his foot and his back was hurting. Patient is unable to provide much history due to discomfort. Patient has stage 3 ulcers on sacrum, penile ulcer. Also has chronic wounds on foot. He was found to be hypotensive on field 60/30. He was given 3L bolus in ED after which BP was still low 70/30. Patient denies fever. As per AL records no h/o abdominal pain, nausea, vomiting, diarrhea, cough.       PAIN MANAGEMENT:  Pain Scale:                 /10  Pain Location:      Prior Colonoscopy:  Unknown    PAST MEDICAL HISTORY  HTN (hypertension)  HLD (hyperlipidemia)  DM (diabetes mellitus)  CAD (coronary artery disease)  Glaucoma, angle-closure  [place] (hard of hearing)        PAST SURGICAL HISTORY  CABG (coronary artery bypass graft)  Thyroid surgery        Allergies    No Known Allergies    Intolerances  None       MEDICATIONS  (STANDING):  apixaban 5 milliGRAM(s) Oral two times a day  aspirin  chewable 81 milliGRAM(s) Oral daily  atorvastatin 40 milliGRAM(s) Oral at bedtime  dextrose 5%. 1000 milliLiter(s) (75 mL/Hr) IV Continuous <Continuous>  finasteride 5 milliGRAM(s) Oral daily  influenza  Vaccine (HIGH DOSE) 0.7 milliLiter(s) IntraMuscular once  insulin lispro (ADMELOG) corrective regimen sliding scale   SubCutaneous every 6 hours  metoprolol tartrate 12.5 milliGRAM(s) Oral every 12 hours  mirtazapine 7.5 milliGRAM(s) Oral daily  nystatin Ointment 1 Application(s) Topical two times a day  pantoprazole    Tablet 40 milliGRAM(s) Oral before breakfast  tamsulosin 0.4 milliGRAM(s) Oral at bedtime    MEDICATIONS  (PRN):  acetaminophen     Tablet .. 650 milliGRAM(s) Oral every 6 hours PRN Temp greater or equal to 38C (100.4F), Mild Pain (1 - 3)  ondansetron Injectable 4 milliGRAM(s) IV Push every 8 hours PRN Nausea and/or Vomiting      SOCIAL HISTORY  Advanced Directives:       [ X ] Full Code       [  ] DNR  Marital Status:         [  ] M      [ X ] S      [  ] D       [  ] W  Children:       [ X ] Yes      [  ] No  Occupation:        [  ] Employed       [ X ] Unemployed       [  ] Retired  Diet:       [ X ] Regular       [  ] PEG feeding          [  ] NG tube feeding  Drug Use:           [ X ] Patient denied          [  ] Yes  Alcohol:           [ X ] No             [  ] Yes (socially)         [  ] Yes (chronic)  Tobacco:           [  ] Yes           [ X ] No      FAMILY HISTORY  [ X ] Heart Disease            [ X ] Diabetes             [ X ] HTN             [  ] Colon Cancer             [  ] Stomach Cancer              [  ] Pancreatic Cancer      VITAL SIGNS   Vital Signs Last 24 Hrs  T(C): 36.6 (01 Feb 2022 12:00), Max: 36.7 (01 Feb 2022 05:14)  T(F): 97.8 (01 Feb 2022 12:00), Max: 98 (01 Feb 2022 05:14)  HR: 86 (01 Feb 2022 12:00) (86 - 94)  BP: 113/62 (01 Feb 2022 12:00) (113/62 - 120/65)   RR: 18 (01 Feb 2022 12:00) (18 - 18)  SpO2: 98% (01 Feb 2022 12:00) (97% - 98%)        CBC Full  -  ( 01 Feb 2022 07:35 )  WBC Count : 5.36 K/uL  RBC Count : 2.51 M/uL  Hemoglobin : 7.3 g/dL  Hematocrit : 21.8 %  Platelet Count - Automated : 97 K/uL  Mean Cell Volume : 86.9 fl  Mean Cell Hemoglobin : 29.1 pg  Mean Cell Hemoglobin Concentration : 33.5 gm/dL  Auto Neutrophil # : x  Auto Lymphocyte # : x  Auto Monocyte # : x  Auto Eosinophil # : x  Auto Basophil # : x  Auto Neutrophil % : x  Auto Lymphocyte % : x  Auto Monocyte % : x  Auto Eosinophil % : x  Auto Basophil % : x      02-01    138  |  106  |  27<H>  ----------------------------<  131<H>  3.0<L>   |  24  |  1.21    Ca    7.7<L>      01 Feb 2022 07:35        RADIOLOGY/IMAGING                ACC: [id number] EXAM:  XR CHEST PORTABLE IMMED 1V                          PROCEDURE DATE:  01/17/2022          INTERPRETATION:  Chest one view    HISTORY: Line placement    COMPARISON STUDY: Earlier the same day    Frontal expiratory view of the chest shows the heart to be normal in   size. Right jugular line reaches the upper superior vena cava.    The lungs are clear and there is no evidence of pneumothorax nor pleural   effusion.    IMPRESSION:  No pneumothorax.

## 2022-02-01 NOTE — GOALS OF CARE CONVERSATION - ADVANCED CARE PLANNING - CONVERSATION DETAILS
Palliative care SW called pt's daughter Arabella Oliva ([phone]) to provide emotional support. Pt's daughter reported that she is having a difficult time adjusting to her father's admission. Pt's daughter shared that she is well supported by her significant other, as well as her brother. Pt's daughter reflected on her father's hospitalization, expressing feelings of shock around the rapid nature of his decline. Pt's daughter stated that there has been "a lot of sickness throughout the family," sharing that both her mother and aunt have health issues as well. Pt's daughter verbalized that she has been feeling overwhelmed by her family's hospitalizations, as she has been feeling anxious around the health status of each family member, making it difficult to sleep. Pt's daughter expressed feelings of anxiety around her father's mortality. Palliative care SW provided anticipatory grief counseling, empathic listening, and normalization/validation. Pt's daughter agreed to reach out as needed.    Pt's daughter expressed feelings of concern around her brother's adjustment to their parent's health struggles, as she shared that he has had his own health issues recently. Palliative care SW called pt's brother; no answer or no returned call.     Martinez Barr  [phone]

## 2022-02-01 NOTE — PROGRESS NOTE ADULT - ASSESSMENT
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 Patient received 3L bolus in ED, s/p BP was still low 70/30s. Patient admitted to ICU for septic shock requiring pressors.     #Septic shock 2/2 ulcers vs UTI  #Delirium  #Acute renal failure  #Penile ulcer  #Sacral ulcer  #Diabetic foot ulcer  #Elevated INR  #Elevated LFTs

## 2022-02-01 NOTE — CONSULT NOTE ADULT - GASTROINTESTINAL DETAILS
soft/nontender/no distention/no masses palpable/no rebound tenderness/no guarding/no rigidity
soft/nontender/no distention/no masses palpable/bowel sounds normal/no guarding/no rigidity/no organomegaly

## 2022-02-02 LAB
ANION GAP SERPL CALC-SCNC: 9 MMOL/L — SIGNIFICANT CHANGE UP (ref 5–17)
ANISOCYTOSIS BLD QL: SLIGHT — SIGNIFICANT CHANGE UP
BLD GP AB SCN SERPL QL: SIGNIFICANT CHANGE UP
BUN SERPL-MCNC: 25 MG/DL — HIGH (ref 7–18)
CALCIUM SERPL-MCNC: 7.6 MG/DL — LOW (ref 8.4–10.5)
CHLORIDE SERPL-SCNC: 104 MMOL/L — SIGNIFICANT CHANGE UP (ref 96–108)
CO2 SERPL-SCNC: 24 MMOL/L — SIGNIFICANT CHANGE UP (ref 22–31)
CREAT SERPL-MCNC: 1.23 MG/DL — SIGNIFICANT CHANGE UP (ref 0.5–1.3)
GLUCOSE BLDC GLUCOMTR-MCNC: 147 MG/DL — HIGH (ref 70–99)
GLUCOSE BLDC GLUCOMTR-MCNC: 150 MG/DL — HIGH (ref 70–99)
GLUCOSE BLDC GLUCOMTR-MCNC: 152 MG/DL — HIGH (ref 70–99)
GLUCOSE BLDC GLUCOMTR-MCNC: 156 MG/DL — HIGH (ref 70–99)
GLUCOSE SERPL-MCNC: 138 MG/DL — HIGH (ref 70–99)
HCT VFR BLD CALC: 20.2 % — CRITICAL LOW (ref 39–50)
HCT VFR BLD CALC: 21 % — CRITICAL LOW (ref 39–50)
HGB BLD-MCNC: 6.9 G/DL — CRITICAL LOW (ref 13–17)
HGB BLD-MCNC: 7.1 G/DL — LOW (ref 13–17)
MANUAL SMEAR VERIFICATION: SIGNIFICANT CHANGE UP
MCHC RBC-ENTMCNC: 29 PG — SIGNIFICANT CHANGE UP (ref 27–34)
MCHC RBC-ENTMCNC: 29.1 PG — SIGNIFICANT CHANGE UP (ref 27–34)
MCHC RBC-ENTMCNC: 33.8 GM/DL — SIGNIFICANT CHANGE UP (ref 32–36)
MCHC RBC-ENTMCNC: 34.2 GM/DL — SIGNIFICANT CHANGE UP (ref 32–36)
MCV RBC AUTO: 85.2 FL — SIGNIFICANT CHANGE UP (ref 80–100)
MCV RBC AUTO: 85.7 FL — SIGNIFICANT CHANGE UP (ref 80–100)
MICROCYTES BLD QL: SLIGHT — SIGNIFICANT CHANGE UP
NRBC # BLD: 0 /100 WBCS — SIGNIFICANT CHANGE UP (ref 0–0)
NRBC # BLD: 0 /100 WBCS — SIGNIFICANT CHANGE UP (ref 0–0)
PLAT MORPH BLD: NORMAL — SIGNIFICANT CHANGE UP
PLATELET # BLD AUTO: 102 K/UL — LOW (ref 150–400)
PLATELET # BLD AUTO: 103 K/UL — LOW (ref 150–400)
POIKILOCYTOSIS BLD QL AUTO: SLIGHT — SIGNIFICANT CHANGE UP
POTASSIUM SERPL-MCNC: 3.4 MMOL/L — LOW (ref 3.5–5.3)
POTASSIUM SERPL-SCNC: 3.4 MMOL/L — LOW (ref 3.5–5.3)
RBC # BLD: 2.37 M/UL — LOW (ref 4.2–5.8)
RBC # BLD: 2.45 M/UL — LOW (ref 4.2–5.8)
RBC # FLD: 16.1 % — HIGH (ref 10.3–14.5)
RBC # FLD: 16.5 % — HIGH (ref 10.3–14.5)
RBC BLD AUTO: ABNORMAL
SODIUM SERPL-SCNC: 137 MMOL/L — SIGNIFICANT CHANGE UP (ref 135–145)
WBC # BLD: 4.78 K/UL — SIGNIFICANT CHANGE UP (ref 3.8–10.5)
WBC # BLD: 5.49 K/UL — SIGNIFICANT CHANGE UP (ref 3.8–10.5)
WBC # FLD AUTO: 4.78 K/UL — SIGNIFICANT CHANGE UP (ref 3.8–10.5)
WBC # FLD AUTO: 5.49 K/UL — SIGNIFICANT CHANGE UP (ref 3.8–10.5)

## 2022-02-02 PROCEDURE — 99497 ADVNCD CARE PLAN 30 MIN: CPT | Mod: 25

## 2022-02-02 PROCEDURE — 99232 SBSQ HOSP IP/OBS MODERATE 35: CPT

## 2022-02-02 DEVICE — CATH BALLOON DIL 6-8MM: Type: IMPLANTABLE DEVICE | Status: FUNCTIONAL

## 2022-02-02 DEVICE — CATH ESOPH DIL 8 ATM 6FR10-12M: Type: IMPLANTABLE DEVICE | Status: FUNCTIONAL

## 2022-02-02 RX ORDER — POTASSIUM CHLORIDE 20 MEQ
10 PACKET (EA) ORAL
Refills: 0 | Status: COMPLETED | OUTPATIENT
Start: 2022-02-02 | End: 2022-02-02

## 2022-02-02 RX ADMIN — Medication 1 APPLICATION(S): at 11:05

## 2022-02-02 RX ADMIN — Medication 100 MILLIEQUIVALENT(S): at 10:21

## 2022-02-02 RX ADMIN — SODIUM CHLORIDE 75 MILLILITER(S): 9 INJECTION, SOLUTION INTRAVENOUS at 05:38

## 2022-02-02 RX ADMIN — ONDANSETRON 4 MILLIGRAM(S): 8 TABLET, FILM COATED ORAL at 10:21

## 2022-02-02 RX ADMIN — NYSTATIN CREAM 1 APPLICATION(S): 100000 CREAM TOPICAL at 17:31

## 2022-02-02 RX ADMIN — NYSTATIN CREAM 1 APPLICATION(S): 100000 CREAM TOPICAL at 05:39

## 2022-02-02 RX ADMIN — Medication 2: at 00:40

## 2022-02-02 RX ADMIN — Medication 100 MILLIEQUIVALENT(S): at 11:19

## 2022-02-02 RX ADMIN — Medication 2: at 06:59

## 2022-02-02 RX ADMIN — SODIUM CHLORIDE 75 MILLILITER(S): 9 INJECTION, SOLUTION INTRAVENOUS at 10:26

## 2022-02-02 NOTE — PROGRESS NOTE ADULT - SUBJECTIVE AND OBJECTIVE BOX
follow up on:  complex medical decision making related to goals of care    OVERNIGHT EVENTS: no overnight events. Remains confused, lethargic. Did not participate w SLP nataliya    Present Symptoms:      Review of Systems:   Unable to obtain due to poor mentation     MEDICATIONS  (STANDING):  apixaban 5 milliGRAM(s) Oral two times a day  aspirin  chewable 81 milliGRAM(s) Oral daily  atorvastatin 40 milliGRAM(s) Oral at bedtime  collagenase Ointment 1 Application(s) Topical daily  dextrose 5%. 1000 milliLiter(s) (75 mL/Hr) IV Continuous <Continuous>  finasteride 5 milliGRAM(s) Oral daily  influenza  Vaccine (HIGH DOSE) 0.7 milliLiter(s) IntraMuscular once  insulin lispro (ADMELOG) corrective regimen sliding scale   SubCutaneous every 6 hours  metoprolol tartrate 12.5 milliGRAM(s) Oral every 12 hours  mirtazapine 7.5 milliGRAM(s) Oral daily  nystatin Ointment 1 Application(s) Topical two times a day  pantoprazole    Tablet 40 milliGRAM(s) Oral before breakfast  tamsulosin 0.4 milliGRAM(s) Oral at bedtime    MEDICATIONS  (PRN):  acetaminophen     Tablet .. 650 milliGRAM(s) Oral every 6 hours PRN Temp greater or equal to 38C (100.4F), Mild Pain (1 - 3)  ondansetron Injectable 4 milliGRAM(s) IV Push every 8 hours PRN Nausea and/or Vomiting      PHYSICAL EXAM:  Vital Signs Last 24 Hrs  T(C): 36.8 (2022 05:00), Max: 37.2 (2022 20:30)  T(F): 98.2 (2022 05:00), Max: 98.9 (2022 20:30)  HR: 110 (2022 05:00) (70 - 114)  BP: 142/76 (2022 05:00) (120/61 - 142/76)  BP(mean): --  RR: 18 (2022 05:00) (18 - 20)  SpO2: 98% (2022 05:00) (97% - 98%)       Palliative Performance Scale/Karnofsky Score: 20      GENERAL:   lethargic,  cachectic,  NAD  HEENT: Atraumatic, oropharynx clear, neck supple  CHEST/LUNG: unlabored  HEART: Regular rate and rhythm    ABDOMEN: Soft, Nontender, Nondistended   MUSCULOSKELETAL:  No  edema,  bedbound  NERVOUS SYSTEM:    lethargic, opens eyes, not following commands  SKIN: LE ulcer, St IV sacral decub noted  Oral intake: npo      LABS:                          7.1    5.49  )-----------( 102      ( 2022 05:31 )             21.0         137  |  104  |  25<H>  ----------------------------<  138<H>  3.4<L>   |  24  |  1.23    Ca    7.6<L>      2022 05:31      Urinalysis Basic - ( 2022 09:25 )    Color: Yellow / Appearance: Clear / S.015 / pH: x  Gluc: x / Ketone: Negative  / Bili: Negative / Urobili: Negative   Blood: x / Protein: 30 mg/dL / Nitrite: Negative   Leuk Esterase: Moderate / RBC: 10-25 /HPF / WBC >50 /HPF   Sq Epi: x / Non Sq Epi: Few /HPF / Bacteria: Few /HPF        RADIOLOGY & ADDITIONAL STUDIES:

## 2022-02-02 NOTE — CHART NOTE - NSCHARTNOTEFT_GEN_A_CORE
Assessment:   76yMalePatient is a 76y old  Male who presents with a chief complaint of Sepsis OREN (27 Jan 2022 14:44) Pt visited. Pt is On Droplet Isolation. Pt asleep. IV fluid Infusing. Pt is NPO = ~ 8 days. Pt DG from ICU to  4 S on 1/24. Palliative care consult Noted. D/W RN Pt went for PEG but  Unsuccessful.  Labs Noted , Phos 2.0. Pt with CKD stage 3 . Pt with Pressure ulcer Stage 3 @ L Hip, Unstageable  @  sacrum.       Factors impacting intake: [ ] none [ ] nausea  [ ] vomiting [ ] diarrhea [ ] constipation  [ ]chewing problems [ x] swallowing issues  [ ] other:     Diet Prescription: Diet, NPO (01-26-22 @ 10:20)    Intake: NPO     Current Weight:   % Weight Change Bed scale 133 LB    Pertinent Medications: MEDICATIONS  (STANDING):  apixaban 5 milliGRAM(s) Oral two times a day  aspirin  chewable 81 milliGRAM(s) Oral daily  atorvastatin 40 milliGRAM(s) Oral at bedtime  collagenase Ointment 1 Application(s) Topical daily  dextrose 5%. 1000 milliLiter(s) (75 mL/Hr) IV Continuous <Continuous>  finasteride 5 milliGRAM(s) Oral daily  influenza  Vaccine (HIGH DOSE) 0.7 milliLiter(s) IntraMuscular once  insulin lispro (ADMELOG) corrective regimen sliding scale   SubCutaneous every 6 hours  metoprolol tartrate 12.5 milliGRAM(s) Oral every 12 hours  mirtazapine 7.5 milliGRAM(s) Oral daily  nystatin Ointment 1 Application(s) Topical two times a day  pantoprazole    Tablet 40 milliGRAM(s) Oral before breakfast  tamsulosin 0.4 milliGRAM(s) Oral at bedtime    MEDICATIONS  (PRN):  acetaminophen     Tablet .. 650 milliGRAM(s) Oral every 6 hours PRN Temp greater or equal to 38C (100.4F), Mild Pain (1 - 3)  ondansetron Injectable 4 milliGRAM(s) IV Push every 8 hours PRN Nausea and/or Vomiting    Pertinent Labs: 02-02 Na137 mmol/L Glu 138 mg/dL<H> K+ 3.4 mmol/L<L> Cr  1.23 mg/dL BUN 25 mg/dL<H> 01-28 Phos 2.0 mg/dL<L>     CAPILLARY BLOOD GLUCOSE      POCT Blood Glucose.: 147 mg/dL (02 Feb 2022 13:28)  POCT Blood Glucose.: 156 mg/dL (02 Feb 2022 06:34)  POCT Blood Glucose.: 152 mg/dL (01 Feb 2022 23:59)  POCT Blood Glucose.: 147 mg/dL (01 Feb 2022 18:27)    Skin:  Pressure Ulcer L hip stage 3, Unstagebale @  sacrum    Estimated Needs:   [ ] no change since previous assessment  [ ] recalculated:     Previous Nutrition Diagnosis:   [ ] Inadequate Energy Intake [ ]Inadequate Oral Intake [ ] Excessive Energy Intake   [ ] Underweight [ ] Increased Nutrient Needs [ ] Overweight/Obesity   [ ] Altered GI Function [ ] Unintended Weight Loss [ ] Food & Nutrition Related Knowledge Deficit [ ] Malnutrition     Nutrition Diagnosis is [ ] ongoing  [ ] resolved [ ] not applicable     New Nutrition Diagnosis: [ ] not applicable       Interventions:   Recommend  [ ] Change Diet To:  [ ] Nutrition Supplement  [x ] Nutrition Support   When TF Started Suggest Glucerna 1.5 initial Goal Rate @  65 ml /hr x 16 hr as tolerated to Provide 1040 ml , 1560 Kcal, Protein 86 gms, free water 789 ml /day.    [x ] Other:  Mvim , Vit c, , Zinc     Monitoring and Evaluation:   [ ] PO intake [ x ] Tolerance to diet prescription [ x ] weights [ x ] labs[ x ] follow up per protocol  [ ] other: Assessment:   76yMalePatient is a 76y old  Male who presents with a chief complaint of Sepsis OREN (27 Jan 2022 14:44) Pt visited. Pt is On Droplet Isolation. Pt asleep. IV fluid Infusing. Pt is NPO = ~ 8 days. Pt DG from ICU to  4 S on 1/24. Palliative care consult Noted. D/W RN Pt went for PEG but  Unsuccessful.  Labs Noted , Phos 2.0. Pt with CKD stage 3 . Pt with Pressure ulcer Stage 3 @ L Hip, Unstageable  @  sacrum.       Factors impacting intake: [ ] none [ ] nausea  [ ] vomiting [ ] diarrhea [ ] constipation  [ ]chewing problems [ x] swallowing issues  [ ] other:     Diet Prescription: Diet, NPO (01-26-22 @ 10:20)    Intake: NPO     Current Weight:   % Weight Change Bed scale 133 LB    Pertinent Medications: MEDICATIONS  (STANDING):  apixaban 5 milliGRAM(s) Oral two times a day  aspirin  chewable 81 milliGRAM(s) Oral daily  atorvastatin 40 milliGRAM(s) Oral at bedtime  collagenase Ointment 1 Application(s) Topical daily  dextrose 5%. 1000 milliLiter(s) (75 mL/Hr) IV Continuous <Continuous>  finasteride 5 milliGRAM(s) Oral daily  influenza  Vaccine (HIGH DOSE) 0.7 milliLiter(s) IntraMuscular once  insulin lispro (ADMELOG) corrective regimen sliding scale   SubCutaneous every 6 hours  metoprolol tartrate 12.5 milliGRAM(s) Oral every 12 hours  mirtazapine 7.5 milliGRAM(s) Oral daily  nystatin Ointment 1 Application(s) Topical two times a day  pantoprazole    Tablet 40 milliGRAM(s) Oral before breakfast  tamsulosin 0.4 milliGRAM(s) Oral at bedtime    MEDICATIONS  (PRN):  acetaminophen     Tablet .. 650 milliGRAM(s) Oral every 6 hours PRN Temp greater or equal to 38C (100.4F), Mild Pain (1 - 3)  ondansetron Injectable 4 milliGRAM(s) IV Push every 8 hours PRN Nausea and/or Vomiting    Pertinent Labs: 02-02 Na137 mmol/L Glu 138 mg/dL<H> K+ 3.4 mmol/L<L> Cr  1.23 mg/dL BUN 25 mg/dL<H> 01-28 Phos 2.0 mg/dL<L>     CAPILLARY BLOOD GLUCOSE      POCT Blood Glucose.: 147 mg/dL (02 Feb 2022 13:28)  POCT Blood Glucose.: 156 mg/dL (02 Feb 2022 06:34)  POCT Blood Glucose.: 152 mg/dL (01 Feb 2022 23:59)  POCT Blood Glucose.: 147 mg/dL (01 Feb 2022 18:27)    Skin:  Pressure Ulcer L hip stage 3, Unstagebale @  sacrum    Estimated Needs:   [ ] no change since previous assessment  [ ] recalculated:     Previous Nutrition Diagnosis:   [ ] Inadequate Energy Intake [ ]Inadequate Oral Intake [ ] Excessive Energy Intake   [ ] Underweight [ ] Increased Nutrient Needs [ ] Overweight/Obesity   [ ] Altered GI Function [ ] Unintended Weight Loss [ ] Food & Nutrition Related Knowledge Deficit [x ] Malnutrition  Moderate     Nutrition Diagnosis is [x ] ongoing  [ ] resolved [ ] not applicable     New Nutrition Diagnosis: [ ] not applicable       Interventions:   Recommend  [ ] Change Diet To:  [ ] Nutrition Supplement  [x ] Nutrition Support   When TF Started Suggest Glucerna 1.5 initial Goal Rate @  65 ml /hr x 16 hr as tolerated to Provide 1040 ml , 1560 Kcal, Protein 86 gms, free water 789 ml /day.    [x ] Other:  Mvim , Vit c, , Zinc     Monitoring and Evaluation:   [ ] PO intake [ x ] Tolerance to diet prescription [ x ] weights [ x ] labs[ x ] follow up per protocol  [ ] other:

## 2022-02-02 NOTE — CHART NOTE - NSCHARTNOTEFT_GEN_A_CORE
Primary team signed out to f/u CBC. Pt Hb 6.9. Consent obtained from daughter and will transfuse 1u pRBC. Will signout to night team to f/u post transfusion cbc. Will continue eliquis despite hematuria as per primary team.

## 2022-02-02 NOTE — PROGRESS NOTE ADULT - PROBLEM SELECTOR PLAN 1
around 4.2, improving  Patient has normal creatinine at baseline, 1.18 Nov 2021  Presented with elevated BUN/cr of 51/4.45 improving  Most likely ATN from hypoperfusion   S/p 6L bolus, No more fluids for now  Will monitor UO and BMP  Nephro on board, Dr. Herrera    Pall care on board, patient eligible for hospice but patient remains full code for now, daughter wants to talk to pt's son  family considering PEG tube  GI consulted Dr Viera for PEG tube eval, for PEG today    Daughter and son continue to express patient code status as full code

## 2022-02-02 NOTE — PROGRESS NOTE ADULT - ASSESSMENT
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 Patient received 3L bolus in ED, s/p BP was still low 70/30s. Patient admitted to ICU for septic shock requiring pressors.     #Septic shock 2/2 ulcers vs UTI  #Delirium  #Acute renal failure  #Penile ulcer  #Sacral ulcer  #Diabetic foot ulcer  #Elevated INR  #Elevated LFTs

## 2022-02-02 NOTE — PROGRESS NOTE ADULT - PROBLEM SELECTOR PLAN 1
usually A&Ox3 at baseline, w superimposed delirium likely 2/2 acute illness, hospitalization. 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 Remains lethargic. Hospice eligible. Family not interested at this time. Remains FULL CODE per family wishes.

## 2022-02-03 LAB
ANION GAP SERPL CALC-SCNC: 11 MMOL/L — SIGNIFICANT CHANGE UP (ref 5–17)
BUN SERPL-MCNC: 25 MG/DL — HIGH (ref 7–18)
CALCIUM SERPL-MCNC: 7.5 MG/DL — LOW (ref 8.4–10.5)
CHLORIDE SERPL-SCNC: 102 MMOL/L — SIGNIFICANT CHANGE UP (ref 96–108)
CO2 SERPL-SCNC: 22 MMOL/L — SIGNIFICANT CHANGE UP (ref 22–31)
CREAT SERPL-MCNC: 1.13 MG/DL — SIGNIFICANT CHANGE UP (ref 0.5–1.3)
GLUCOSE BLDC GLUCOMTR-MCNC: 145 MG/DL — HIGH (ref 70–99)
GLUCOSE BLDC GLUCOMTR-MCNC: 147 MG/DL — HIGH (ref 70–99)
GLUCOSE BLDC GLUCOMTR-MCNC: 150 MG/DL — HIGH (ref 70–99)
GLUCOSE BLDC GLUCOMTR-MCNC: 159 MG/DL — HIGH (ref 70–99)
GLUCOSE SERPL-MCNC: 148 MG/DL — HIGH (ref 70–99)
HCT VFR BLD CALC: 20.6 % — CRITICAL LOW (ref 39–50)
HCT VFR BLD CALC: 29.3 % — LOW (ref 39–50)
HGB BLD-MCNC: 10.2 G/DL — LOW (ref 13–17)
HGB BLD-MCNC: 6.6 G/DL — CRITICAL LOW (ref 13–17)
MCHC RBC-ENTMCNC: 28.7 PG — SIGNIFICANT CHANGE UP (ref 27–34)
MCHC RBC-ENTMCNC: 29.1 PG — SIGNIFICANT CHANGE UP (ref 27–34)
MCHC RBC-ENTMCNC: 32 GM/DL — SIGNIFICANT CHANGE UP (ref 32–36)
MCHC RBC-ENTMCNC: 34.8 GM/DL — SIGNIFICANT CHANGE UP (ref 32–36)
MCV RBC AUTO: 82.5 FL — SIGNIFICANT CHANGE UP (ref 80–100)
MCV RBC AUTO: 90.7 FL — SIGNIFICANT CHANGE UP (ref 80–100)
NRBC # BLD: 0 /100 WBCS — SIGNIFICANT CHANGE UP (ref 0–0)
NRBC # BLD: 0 /100 WBCS — SIGNIFICANT CHANGE UP (ref 0–0)
PLATELET # BLD AUTO: 74 K/UL — LOW (ref 150–400)
PLATELET # BLD AUTO: 90 K/UL — LOW (ref 150–400)
POTASSIUM SERPL-MCNC: 3 MMOL/L — LOW (ref 3.5–5.3)
POTASSIUM SERPL-SCNC: 3 MMOL/L — LOW (ref 3.5–5.3)
RBC # BLD: 2.27 M/UL — LOW (ref 4.2–5.8)
RBC # BLD: 3.55 M/UL — LOW (ref 4.2–5.8)
RBC # FLD: 14.9 % — HIGH (ref 10.3–14.5)
RBC # FLD: 16.1 % — HIGH (ref 10.3–14.5)
SODIUM SERPL-SCNC: 135 MMOL/L — SIGNIFICANT CHANGE UP (ref 135–145)
WBC # BLD: 4.76 K/UL — SIGNIFICANT CHANGE UP (ref 3.8–10.5)
WBC # BLD: 5.65 K/UL — SIGNIFICANT CHANGE UP (ref 3.8–10.5)
WBC # FLD AUTO: 4.76 K/UL — SIGNIFICANT CHANGE UP (ref 3.8–10.5)
WBC # FLD AUTO: 5.65 K/UL — SIGNIFICANT CHANGE UP (ref 3.8–10.5)

## 2022-02-03 PROCEDURE — 71045 X-RAY EXAM CHEST 1 VIEW: CPT | Mod: 26

## 2022-02-03 RX ORDER — POTASSIUM CHLORIDE 20 MEQ
10 PACKET (EA) ORAL
Refills: 0 | Status: COMPLETED | OUTPATIENT
Start: 2022-02-03 | End: 2022-02-03

## 2022-02-03 RX ADMIN — Medication 1 APPLICATION(S): at 11:11

## 2022-02-03 RX ADMIN — NYSTATIN CREAM 1 APPLICATION(S): 100000 CREAM TOPICAL at 17:27

## 2022-02-03 RX ADMIN — Medication 100 MILLIEQUIVALENT(S): at 11:05

## 2022-02-03 RX ADMIN — SODIUM CHLORIDE 75 MILLILITER(S): 9 INJECTION, SOLUTION INTRAVENOUS at 06:05

## 2022-02-03 RX ADMIN — Medication 2: at 17:27

## 2022-02-03 RX ADMIN — ONDANSETRON 4 MILLIGRAM(S): 8 TABLET, FILM COATED ORAL at 06:05

## 2022-02-03 RX ADMIN — Medication 100 MILLIEQUIVALENT(S): at 09:33

## 2022-02-03 RX ADMIN — Medication 100 MILLIEQUIVALENT(S): at 08:20

## 2022-02-03 RX ADMIN — NYSTATIN CREAM 1 APPLICATION(S): 100000 CREAM TOPICAL at 06:05

## 2022-02-03 NOTE — PROGRESS NOTE ADULT - SUBJECTIVE AND OBJECTIVE BOX
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 Patient is alert and oriented on encounter. Patient states that he cut his foot and his back was hurting. Patient is unable to provide much history due to discomfort. Patient has stage 3 ulcers on sacrum, penile ulcer. Also has chronic wounds on foot. He was found to be hypotensive on field 60/30. He was given 3L bolus in ED after which BP was still low 70/30. Patient denies fever. As per AL records no h/o abdominal pain, nausea, vomiting, diarrhea, cough.  (17 Jan 2022 15:23)      INTERIM EVENTS:Patient seen at bedside interim events noted.      PMH -reviewed admission note, no change since admission  Heart Failure: Acute [ ] Chronic [ ] Acute on Chronic [ ] Diastolic [ ] Systolic [ ] Combined Systolic and Diastolic[ ]  OREN[ ]  ATN[ ]  CKD I [ ] CKDII [ ] CKD III [ ] CKD IV [ ] CKD V [ ] ESRD[ ]  HTN[ ] CVA[ ] DM[ ] COPD[ ] COVID[ ] AF[ ]  PPM[ ] ICD[ ]    MEDICATIONS  (STANDING):  apixaban 5 milliGRAM(s) Oral two times a day  aspirin  chewable 81 milliGRAM(s) Oral daily  atorvastatin 40 milliGRAM(s) Oral at bedtime  collagenase Ointment 1 Application(s) Topical daily  dextrose 5%. 1000 milliLiter(s) (75 mL/Hr) IV Continuous <Continuous>  finasteride 5 milliGRAM(s) Oral daily  influenza  Vaccine (HIGH DOSE) 0.7 milliLiter(s) IntraMuscular once  insulin lispro (ADMELOG) corrective regimen sliding scale   SubCutaneous every 6 hours  metoprolol tartrate 12.5 milliGRAM(s) Oral every 12 hours  mirtazapine 7.5 milliGRAM(s) Oral daily  nystatin Ointment 1 Application(s) Topical two times a day  pantoprazole    Tablet 40 milliGRAM(s) Oral before breakfast  tamsulosin 0.4 milliGRAM(s) Oral at bedtime    MEDICATIONS  (PRN):  acetaminophen     Tablet .. 650 milliGRAM(s) Oral every 6 hours PRN Temp greater or equal to 38C (100.4F), Mild Pain (1 - 3)  ondansetron Injectable 4 milliGRAM(s) IV Push every 8 hours PRN Nausea and/or Vomiting            REVIEW OF SYSTEMS:  Constitutional: [ ] fever, [ ]weight loss,  [ ]fatigue  Eyes: [ ] visual changes  Respiratory: [ ]shortness of breath;  [ ] cough, [ ]wheezing, [ ]chills, [ ]hemoptysis  Cardiovascular: [ ] chest pain, [ ]palpitations, [ ]dizziness,  [ ]leg swelling[ ]orthopnea[ ]PND  Gastrointestinal: [ ] abdominal pain, [ ]nausea, [ ]vomiting,  [ ]diarrhea [ ]Constipation [ ]Melena  Genitourinary: [ ] dysuria, [ ] hematuria [ ][place]  Neurologic: [ ] headaches [ ] tremors[ ]weakness [ ]Paralysis Right[ ] Left[ ]  Skin: [ ] itching, [ ]burning, [ ] rashes  Endocrine: [ ] heat or cold intolerance  Musculoskeletal: [ ] joint pain or swelling; [ ] muscle, back, or extremity pain  Psychiatric: [ ] depression, [ ]anxiety, [ ]mood swings, or [ ]difficulty sleeping  Hematologic: [ ] easy bruising, [ ] bleeding gums    [ ] All remaining systems negative except as per above.   [ ]Unable to obtain.  [x] No change in ROS since admission      Vital Signs Last 24 Hrs  T(C): 36.5 (03 Feb 2022 12:59), Max: 37.1 (02 Feb 2022 20:02)  T(F): 97.7 (03 Feb 2022 12:59), Max: 98.8 (02 Feb 2022 20:02)  HR: 98 (03 Feb 2022 12:59) (93 - 104)  BP: 157/79 (03 Feb 2022 12:59) (130/64 - 157/79)  BP(mean): --  RR: 20 (03 Feb 2022 12:59) (18 - 20)  SpO2: 99% (03 Feb 2022 12:59) (93% - 100%)  I&O's Summary    02 Feb 2022 07:01  -  03 Feb 2022 07:00  --------------------------------------------------------  IN: 0 mL / OUT: 300 mL / NET: -300 mL    03 Feb 2022 07:01  -  03 Feb 2022 18:10  --------------------------------------------------------  IN: 0 mL / OUT: 300 mL / NET: -300 mL        PHYSICAL EXAM:  General: No acute distress BMI-  HEENT: EOMI, PERRL  Neck: Supple, [ ] JVD  Lungs: Equal air entry bilaterally; [ ] rales [ ] wheezing [ ] rhonchi  Heart: Regular rate and rhythm; [ ] murmur   /6 [ ] systolic [ ] diastolic [ ] radiation[ ] rubs [ ]  gallops  Abdomen: Nontender, bowel sounds present  Extremities: No clubbing, cyanosis, [ ] edema [ ]Pulses  equal and intact  Nervous system:  Alert & Oriented X3, no focal deficits  Psychiatric: Normal affect  Skin: No rashes or lesions    LABS:  02-03    135  |  102  |  25<H>  ----------------------------<  148<H>  3.0<L>   |  22  |  1.13    Ca    7.5<L>      03 Feb 2022 07:18      Creatinine Trend: 1.13<--, 1.23<--, 1.21<--, 1.33<--, 1.34<--, 1.51<--                        10.2   5.65  )-----------( 90       ( 03 Feb 2022 07:18 )             29.3         Cardiac Enzymes:

## 2022-02-03 NOTE — PROGRESS NOTE ADULT - SUBJECTIVE AND OBJECTIVE BOX
[   ] ICU                                          [   ] CCU                                      [ X  ] Medical Floor     Patient is a 76 year old male with dysphagia, weight loss and failure to thrive. GI consulted for Peg tube placement.     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 Patient is alert and oriented on encounter. Patient states that he cut his foot and his back was hurting. Patient is unable to provide much history due to discomfort. Patient has stage 3 ulcers on sacrum, penile ulcer. Also has chronic wounds on foot. He was found to be hypotensive on field 60/30. He was given 3L bolus in ED after which BP was still low 70/30. Patient denies fever. As per AL records no h/o abdominal pain, nausea, vomiting, diarrhea, cough.     Patient appears comfortable. No new complaints reported, No abdominal pain, N/V, hematemesis, hematochezia, melena, fever, chills, chest pain, SOB, cough or diarrhea reported.      PAIN MANAGEMENT:  Pain Scale:                 /10  Pain Location:      Prior Colonoscopy:  Unknown    PAST MEDICAL HISTORY  HTN (hypertension)  HLD (hyperlipidemia)  DM (diabetes mellitus)  CAD (coronary artery disease)  Glaucoma, angle-closure  [place] (hard of hearing)        PAST SURGICAL HISTORY  CABG (coronary artery bypass graft)  Thyroid surgery        Allergies    No Known Allergies    Intolerances  None     SOCIAL HISTORY  Advanced Directives:       [ X ] Full Code       [  ] DNR  Marital Status:         [  ] M      [ X ] S      [  ] D       [  ] W  Children:       [ X ] Yes      [  ] No  Occupation:        [  ] Employed       [ X ] Unemployed       [  ] Retired  Diet:       [ X ] Regular       [  ] PEG feeding          [  ] NG tube feeding  Drug Use:           [ X ] Patient denied          [  ] Yes  Alcohol:           [ X ] No             [  ] Yes (socially)         [  ] Yes (chronic)  Tobacco:           [  ] Yes           [ X ] No      FAMILY HISTORY  [ X ] Heart Disease            [ X ] Diabetes             [ X ] HTN             [  ] Colon Cancer             [  ] Stomach Cancer              [  ] Pancreatic Cancer        VITALS  Vital Signs Last 24 Hrs  T(C): 36.5 (03 Feb 2022 12:59), Max: 37.1 (02 Feb 2022 20:02)  T(F): 97.7 (03 Feb 2022 12:59), Max: 98.8 (02 Feb 2022 20:02)  HR: 98 (03 Feb 2022 12:59) (88 - 104)  BP: 157/79 (03 Feb 2022 12:59) (115/66 - 157/79)   RR: 20 (03 Feb 2022 12:59) (15 - 20)  SpO2: 99% (03 Feb 2022 12:59) (93% - 100%)       MEDICATIONS  (STANDING):  apixaban 5 milliGRAM(s) Oral two times a day  aspirin  chewable 81 milliGRAM(s) Oral daily  atorvastatin 40 milliGRAM(s) Oral at bedtime  collagenase Ointment 1 Application(s) Topical daily  dextrose 5%. 1000 milliLiter(s) (75 mL/Hr) IV Continuous <Continuous>  finasteride 5 milliGRAM(s) Oral daily  influenza  Vaccine (HIGH DOSE) 0.7 milliLiter(s) IntraMuscular once  insulin lispro (ADMELOG) corrective regimen sliding scale   SubCutaneous every 6 hours  metoprolol tartrate 12.5 milliGRAM(s) Oral every 12 hours  mirtazapine 7.5 milliGRAM(s) Oral daily  nystatin Ointment 1 Application(s) Topical two times a day  pantoprazole    Tablet 40 milliGRAM(s) Oral before breakfast  tamsulosin 0.4 milliGRAM(s) Oral at bedtime    MEDICATIONS  (PRN):  acetaminophen     Tablet .. 650 milliGRAM(s) Oral every 6 hours PRN Temp greater or equal to 38C (100.4F), Mild Pain (1 - 3)  ondansetron Injectable 4 milliGRAM(s) IV Push every 8 hours PRN Nausea and/or Vomiting                            10.2   5.65  )-----------( 90       ( 03 Feb 2022 07:18 )             29.3       02-03    135  |  102  |  25<H>  ----------------------------<  148<H>  3.0<L>   |  22  |  1.13    Ca    7.5<L>      03 Feb 2022 07:18

## 2022-02-03 NOTE — PROGRESS NOTE ADULT - ASSESSMENT
1. Dysphagia  2. Weight loss  3. Failure to thrive  4. S/p EGD  5. Partial gastric outlet obstruction    Suggestions:    1. NPO  2. Monitor electrolytes  3. IVF hydration  4. Protonix daily  5. Avoid NSAID  6. CT Scan of abdomen and pelvis with po contrast  7. DVT prophylaxis

## 2022-02-03 NOTE — PROGRESS NOTE ADULT - ASSESSMENT
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 Patient received 3L bolus in ED, s/p BP was still low 70/30s. Patient admitted to ICU for septic shock requiring pressors.     #Septic shock 2/2 ulcers vs UTI  #Delirium  #Acute renal failure  #Penile ulcer  #Sacral ulcer  #Diabetic foot ulcer  #Elevated INR  #Elevated LFTs

## 2022-02-03 NOTE — CHART NOTE - NSCHARTNOTEFT_GEN_A_CORE
CXR revealed NG tube advanced too deep.  f/u full read of Xray  call intern pulled NG tube approximately 2 centimeters

## 2022-02-03 NOTE — CHART NOTE - NSCHARTNOTEFT_GEN_A_CORE
Repeat cbc after transfusion 6.6.  - Ordered another unit of PRBC. Follow up cbc post transfusion.   - Will hold Eliquis for now and sign out to primary team for further workup.

## 2022-02-03 NOTE — CHART NOTE - NSCHARTNOTEFT_GEN_A_CORE
Updated patient's daughter Arabella regarding failed attempt for PEG yesterday. Pt mental status improved this AM, will ask SLP for re-eval prior to further workup of possible PEG tube

## 2022-02-04 LAB
ANION GAP SERPL CALC-SCNC: 10 MMOL/L — SIGNIFICANT CHANGE UP (ref 5–17)
BUN SERPL-MCNC: 20 MG/DL — HIGH (ref 7–18)
CALCIUM SERPL-MCNC: 7.7 MG/DL — LOW (ref 8.4–10.5)
CHLORIDE SERPL-SCNC: 100 MMOL/L — SIGNIFICANT CHANGE UP (ref 96–108)
CO2 SERPL-SCNC: 22 MMOL/L — SIGNIFICANT CHANGE UP (ref 22–31)
CREAT SERPL-MCNC: 1.06 MG/DL — SIGNIFICANT CHANGE UP (ref 0.5–1.3)
GLUCOSE BLDC GLUCOMTR-MCNC: 111 MG/DL — HIGH (ref 70–99)
GLUCOSE BLDC GLUCOMTR-MCNC: 128 MG/DL — HIGH (ref 70–99)
GLUCOSE BLDC GLUCOMTR-MCNC: 133 MG/DL — HIGH (ref 70–99)
GLUCOSE BLDC GLUCOMTR-MCNC: 187 MG/DL — HIGH (ref 70–99)
GLUCOSE SERPL-MCNC: 119 MG/DL — HIGH (ref 70–99)
HCT VFR BLD CALC: 31.9 % — LOW (ref 39–50)
HGB BLD-MCNC: 11.4 G/DL — LOW (ref 13–17)
MCHC RBC-ENTMCNC: 29.2 PG — SIGNIFICANT CHANGE UP (ref 27–34)
MCHC RBC-ENTMCNC: 35.7 GM/DL — SIGNIFICANT CHANGE UP (ref 32–36)
MCV RBC AUTO: 81.8 FL — SIGNIFICANT CHANGE UP (ref 80–100)
NRBC # BLD: 0 /100 WBCS — SIGNIFICANT CHANGE UP (ref 0–0)
PLATELET # BLD AUTO: 99 K/UL — LOW (ref 150–400)
POTASSIUM SERPL-MCNC: 3.5 MMOL/L — SIGNIFICANT CHANGE UP (ref 3.5–5.3)
POTASSIUM SERPL-SCNC: 3.5 MMOL/L — SIGNIFICANT CHANGE UP (ref 3.5–5.3)
RBC # BLD: 3.9 M/UL — LOW (ref 4.2–5.8)
RBC # FLD: 15.8 % — HIGH (ref 10.3–14.5)
SODIUM SERPL-SCNC: 132 MMOL/L — LOW (ref 135–145)
WBC # BLD: 6.15 K/UL — SIGNIFICANT CHANGE UP (ref 3.8–10.5)
WBC # FLD AUTO: 6.15 K/UL — SIGNIFICANT CHANGE UP (ref 3.8–10.5)

## 2022-02-04 PROCEDURE — 74176 CT ABD & PELVIS W/O CONTRAST: CPT | Mod: 26

## 2022-02-04 RX ORDER — SODIUM CHLORIDE 9 MG/ML
1000 INJECTION INTRAMUSCULAR; INTRAVENOUS; SUBCUTANEOUS
Refills: 0 | Status: DISCONTINUED | OUTPATIENT
Start: 2022-02-04 | End: 2022-02-05

## 2022-02-04 RX ORDER — IOHEXOL 300 MG/ML
30 INJECTION, SOLUTION INTRAVENOUS ONCE
Refills: 0 | Status: COMPLETED | OUTPATIENT
Start: 2022-02-04 | End: 2022-02-04

## 2022-02-04 RX ADMIN — NYSTATIN CREAM 1 APPLICATION(S): 100000 CREAM TOPICAL at 17:26

## 2022-02-04 RX ADMIN — Medication 2: at 00:48

## 2022-02-04 RX ADMIN — Medication 1 APPLICATION(S): at 12:11

## 2022-02-04 RX ADMIN — SODIUM CHLORIDE 75 MILLILITER(S): 9 INJECTION, SOLUTION INTRAVENOUS at 06:13

## 2022-02-04 RX ADMIN — IOHEXOL 30 MILLILITER(S): 300 INJECTION, SOLUTION INTRAVENOUS at 09:39

## 2022-02-04 RX ADMIN — NYSTATIN CREAM 1 APPLICATION(S): 100000 CREAM TOPICAL at 06:13

## 2022-02-04 RX ADMIN — SODIUM CHLORIDE 75 MILLILITER(S): 9 INJECTION INTRAMUSCULAR; INTRAVENOUS; SUBCUTANEOUS at 12:13

## 2022-02-04 NOTE — PROGRESS NOTE ADULT - ASSESSMENT
1. Dysphagia  2. Weight loss  3. Failure to thrive  4. S/p EGD  5. Partial gastric outlet obstruction    Suggestions:    1. NPO  2. Monitor electrolytes  3. IVF hydration  4. Protonix daily  5. Avoid NSAID  6. Awaiting CT Scan of abdomen and pelvis with po contrast  7. DVT prophylaxis

## 2022-02-04 NOTE — PROGRESS NOTE ADULT - SUBJECTIVE AND OBJECTIVE BOX
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 Patient received 3L bolus in ED, s/p BP was still low 70/30s. Patient admitted to ICU for septic shock requiring pressors.     INTERVAL HPI/OVERNIGHT EVENTS:   No interval changes noted    REVIEW OF SYSTEMS:  CONSTITUTIONAL: No fever, weight loss, or fatigue  RESPIRATORY: No cough, wheezing, chills or hemoptysis; No shortness of breath  CARDIOVASCULAR: No chest pain, palpitations, dizziness, or leg swelling  GASTROINTESTINAL: No abdominal pain. No nausea, vomiting, or hematemesis; No diarrhea or constipation. No melena or hematochezia.  GENITOURINARY: No dysuria or hematuria, urinary frequency  NEUROLOGICAL: No headaches, memory loss, loss of strength, numbness, or tremors  SKIN: No itching, burning, rashes, or lesions     MEDICATIONS  (STANDING):  apixaban 5 milliGRAM(s) Oral two times a day  aspirin  chewable 81 milliGRAM(s) Oral daily  atorvastatin 40 milliGRAM(s) Oral at bedtime  collagenase Ointment 1 Application(s) Topical daily  dextrose 5%. 1000 milliLiter(s) (75 mL/Hr) IV Continuous <Continuous>  finasteride 5 milliGRAM(s) Oral daily  influenza  Vaccine (HIGH DOSE) 0.7 milliLiter(s) IntraMuscular once  insulin lispro (ADMELOG) corrective regimen sliding scale   SubCutaneous every 6 hours  metoprolol tartrate 12.5 milliGRAM(s) Oral every 12 hours  mirtazapine 7.5 milliGRAM(s) Oral daily  nystatin Ointment 1 Application(s) Topical two times a day  pantoprazole    Tablet 40 milliGRAM(s) Oral before breakfast  tamsulosin 0.4 milliGRAM(s) Oral at bedtime    MEDICATIONS  (PRN):  acetaminophen     Tablet .. 650 milliGRAM(s) Oral every 6 hours PRN Temp greater or equal to 38C (100.4F), Mild Pain (1 - 3)  ondansetron Injectable 4 milliGRAM(s) IV Push every 8 hours PRN Nausea and/or Vomiting      Vital Signs Last 24 Hrs  T(C): 36.8 (02 Feb 2022 13:59), Max: 37.2 (01 Feb 2022 20:30)  T(F): 98.3 (02 Feb 2022 13:59), Max: 98.9 (01 Feb 2022 20:30)  HR: 88 (02 Feb 2022 13:59) (70 - 114)  BP: 115/66 (02 Feb 2022 13:59) (115/66 - 142/76)  BP(mean): --  RR: 15 (02 Feb 2022 13:59) (15 - 20)  SpO2: 100% (02 Feb 2022 13:59) (97% - 100%)    PHYSICAL EXAMINATION:  General: No acute distress  HEENT: EOMI, PERRL  Neck: Supple, [ ] JVD  Lungs: decreased breath sounds   Heart: Regular rate and rhythm  Abdomen: Nontender, bowel sounds present  Extremities: No clubbing, cyanosis, edema  Nervous system:  Alert & Oriented X3, no focal deficits  Psychiatric: Normal affect  Skin: No rashes or lesions                          7.1    5.49  )-----------( 102      ( 02 Feb 2022 05:31 )             21.0     02-02    137  |  104  |  25<H>  ----------------------------<  138<H>  3.4<L>   |  24  |  1.23    Ca    7.6<L>      02 Feb 2022 05:31                I&O's Summary    01 Feb 2022 07:01  -  02 Feb 2022 07:00  --------------------------------------------------------  IN: 75 mL / OUT: 600 mL / NET: -525 mL    02 Feb 2022 07:01  -  02 Feb 2022 16:14  --------------------------------------------------------  IN: 0 mL / OUT: 200 mL / NET: -200 mL            CAPILLARY BLOOD GLUCOSE      RADIOLOGY & ADDITIONAL TESTS:

## 2022-02-04 NOTE — PROGRESS NOTE ADULT - SUBJECTIVE AND OBJECTIVE BOX
[   ] ICU                                          [   ] CCU                                      [  X ] Medical Floor    Patient is a 76 year old male with dysphagia, weight loss and failure to thrive. GI consulted for Peg tube placement.     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 Patient is alert and oriented on encounter. Patient states that he cut his foot and his back was hurting. Patient is unable to provide much history due to discomfort. Patient has stage 3 ulcers on sacrum, penile ulcer. Also has chronic wounds on foot. He was found to be hypotensive on field 60/30. He was given 3L bolus in ED after which BP was still low 70/30. Patient denies fever. As per AL records no h/o abdominal pain, nausea, vomiting, diarrhea, cough.     Patient appears comfortable. No new complaints reported, No abdominal pain, N/V, hematemesis, hematochezia, melena, fever, chills, chest pain, SOB, cough or diarrhea reported.      PAIN MANAGEMENT:  Pain Scale:                 /10  Pain Location:      Prior Colonoscopy:  Unknown    PAST MEDICAL HISTORY  HTN (hypertension)  HLD (hyperlipidemia)  DM (diabetes mellitus)  CAD (coronary artery disease)  Glaucoma, angle-closure  [place] (hard of hearing)        PAST SURGICAL HISTORY  CABG (coronary artery bypass graft)  Thyroid surgery        Allergies    No Known Allergies    Intolerances  None     SOCIAL HISTORY  Advanced Directives:       [ X ] Full Code       [  ] DNR  Marital Status:         [  ] M      [ X ] S      [  ] D       [  ] W  Children:       [ X ] Yes      [  ] No  Occupation:        [  ] Employed       [ X ] Unemployed       [  ] Retired  Diet:       [ X ] Regular       [  ] PEG feeding          [  ] NG tube feeding  Drug Use:           [ X ] Patient denied          [  ] Yes  Alcohol:           [ X ] No             [  ] Yes (socially)         [  ] Yes (chronic)  Tobacco:           [  ] Yes           [ X ] No      FAMILY HISTORY  [ X ] Heart Disease            [ X ] Diabetes             [ X ] HTN             [  ] Colon Cancer             [  ] Stomach Cancer              [  ] Pancreatic Cancer        VITALS  Vital Signs Last 24 Hrs  T(C): 36.8 (04 Feb 2022 04:46), Max: 36.8 (04 Feb 2022 04:46)  T(F): 98.2 (04 Feb 2022 04:46), Max: 98.2 (04 Feb 2022 04:46)  HR: 72 (04 Feb 2022 04:46) (72 - 98)  BP: 146/68 (04 Feb 2022 04:46) (146/68 - 157/79)   RR: 18 (04 Feb 2022 04:46) (17 - 20)  SpO2: 98% (04 Feb 2022 04:46) (98% - 99%)       MEDICATIONS  (STANDING):  apixaban 5 milliGRAM(s) Oral two times a day  aspirin  chewable 81 milliGRAM(s) Oral daily  atorvastatin 40 milliGRAM(s) Oral at bedtime  collagenase Ointment 1 Application(s) Topical daily  dextrose 5%. 1000 milliLiter(s) (75 mL/Hr) IV Continuous <Continuous>  finasteride 5 milliGRAM(s) Oral daily  influenza  Vaccine (HIGH DOSE) 0.7 milliLiter(s) IntraMuscular once  insulin lispro (ADMELOG) corrective regimen sliding scale   SubCutaneous every 6 hours  iohexol 300 mG (iodine)/mL Oral Solution 30 milliLiter(s) Oral once  metoprolol tartrate 12.5 milliGRAM(s) Oral every 12 hours  mirtazapine 7.5 milliGRAM(s) Oral daily  nystatin Ointment 1 Application(s) Topical two times a day  pantoprazole    Tablet 40 milliGRAM(s) Oral before breakfast  tamsulosin 0.4 milliGRAM(s) Oral at bedtime    MEDICATIONS  (PRN):  acetaminophen     Tablet .. 650 milliGRAM(s) Oral every 6 hours PRN Temp greater or equal to 38C (100.4F), Mild Pain (1 - 3)  ondansetron Injectable 4 milliGRAM(s) IV Push every 8 hours PRN Nausea and/or Vomiting                            11.4   6.15  )-----------( 99       ( 04 Feb 2022 07:14 )             31.9       02-04    132<L>  |  100  |  20<H>  ----------------------------<  119<H>  3.5   |  22  |  1.06    Ca    7.7<L>      04 Feb 2022 07:14

## 2022-02-04 NOTE — PROGRESS NOTE ADULT - PROBLEM SELECTOR PLAN 1
around 4.2, improving  Patient has normal creatinine at baseline, 1.18 Nov 2021  Presented with elevated BUN/cr of 51/4.45 improving  Most likely ATN from hypoperfusion   S/p 6L bolus, No more fluids for now  Will monitor UO and BMP  Nephro on board, Dr. Herrera    Pall care on board, patient eligible for hospice but patient remains full code for now, daughter wants to talk to pt's son  family considering PEG tube  GI consulted Dr Viera for PEG tube eval, s/p attempt patient with partial outlet obstruction  CT A/P to r/o obstruction with PO contrast pending results    Daughter and son continue to express patient code status as full code

## 2022-02-04 NOTE — PROGRESS NOTE ADULT - ASSESSMENT
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 Patient received 3L bolus in ED, s/p BP was still low 70/30s. Patient admitted to ICU for septic shock requiring pressors.     #Septic shock 2/2 ulcers vs UTI  #Delirium  #Acute renal failure  #Penile ulcer  #Sacral ulcer  #Diabetic foot ulcer  #Elevated INR  #Elevated LFTs

## 2022-02-05 LAB
ANION GAP SERPL CALC-SCNC: 9 MMOL/L — SIGNIFICANT CHANGE UP (ref 5–17)
BUN SERPL-MCNC: 20 MG/DL — HIGH (ref 7–18)
CALCIUM SERPL-MCNC: 7.7 MG/DL — LOW (ref 8.4–10.5)
CHLORIDE SERPL-SCNC: 100 MMOL/L — SIGNIFICANT CHANGE UP (ref 96–108)
CO2 SERPL-SCNC: 25 MMOL/L — SIGNIFICANT CHANGE UP (ref 22–31)
CREAT SERPL-MCNC: 0.97 MG/DL — SIGNIFICANT CHANGE UP (ref 0.5–1.3)
GLUCOSE BLDC GLUCOMTR-MCNC: 108 MG/DL — HIGH (ref 70–99)
GLUCOSE BLDC GLUCOMTR-MCNC: 113 MG/DL — HIGH (ref 70–99)
GLUCOSE BLDC GLUCOMTR-MCNC: 117 MG/DL — HIGH (ref 70–99)
GLUCOSE BLDC GLUCOMTR-MCNC: 118 MG/DL — HIGH (ref 70–99)
GLUCOSE BLDC GLUCOMTR-MCNC: 99 MG/DL — SIGNIFICANT CHANGE UP (ref 70–99)
GLUCOSE SERPL-MCNC: 103 MG/DL — HIGH (ref 70–99)
HCT VFR BLD CALC: 32.8 % — LOW (ref 39–50)
HGB BLD-MCNC: 11.2 G/DL — LOW (ref 13–17)
MCHC RBC-ENTMCNC: 29 PG — SIGNIFICANT CHANGE UP (ref 27–34)
MCHC RBC-ENTMCNC: 34.1 GM/DL — SIGNIFICANT CHANGE UP (ref 32–36)
MCV RBC AUTO: 85 FL — SIGNIFICANT CHANGE UP (ref 80–100)
NRBC # BLD: 0 /100 WBCS — SIGNIFICANT CHANGE UP (ref 0–0)
PLATELET # BLD AUTO: 86 K/UL — LOW (ref 150–400)
POTASSIUM SERPL-MCNC: 3.6 MMOL/L — SIGNIFICANT CHANGE UP (ref 3.5–5.3)
POTASSIUM SERPL-SCNC: 3.6 MMOL/L — SIGNIFICANT CHANGE UP (ref 3.5–5.3)
RBC # BLD: 3.86 M/UL — LOW (ref 4.2–5.8)
RBC # FLD: 15.5 % — HIGH (ref 10.3–14.5)
SARS-COV-2 RNA SPEC QL NAA+PROBE: SIGNIFICANT CHANGE UP
SARS-COV-2 RNA SPEC QL NAA+PROBE: SIGNIFICANT CHANGE UP
SODIUM SERPL-SCNC: 134 MMOL/L — LOW (ref 135–145)
WBC # BLD: 6.95 K/UL — SIGNIFICANT CHANGE UP (ref 3.8–10.5)
WBC # FLD AUTO: 6.95 K/UL — SIGNIFICANT CHANGE UP (ref 3.8–10.5)

## 2022-02-05 RX ORDER — SODIUM CHLORIDE 9 MG/ML
1000 INJECTION INTRAMUSCULAR; INTRAVENOUS; SUBCUTANEOUS
Refills: 0 | Status: DISCONTINUED | OUTPATIENT
Start: 2022-02-05 | End: 2022-02-10

## 2022-02-05 RX ADMIN — Medication 1 APPLICATION(S): at 11:45

## 2022-02-05 RX ADMIN — NYSTATIN CREAM 1 APPLICATION(S): 100000 CREAM TOPICAL at 06:33

## 2022-02-05 RX ADMIN — NYSTATIN CREAM 1 APPLICATION(S): 100000 CREAM TOPICAL at 17:11

## 2022-02-05 RX ADMIN — Medication 0: at 07:01

## 2022-02-05 RX ADMIN — SODIUM CHLORIDE 75 MILLILITER(S): 9 INJECTION INTRAMUSCULAR; INTRAVENOUS; SUBCUTANEOUS at 07:03

## 2022-02-05 NOTE — PROGRESS NOTE ADULT - SUBJECTIVE AND OBJECTIVE BOX
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 Patient is alert and oriented on encounter. Patient states that he cut his foot and his back was hurting. Patient is unable to provide much history due to discomfort. Patient has stage 3 ulcers on sacrum, penile ulcer. Also has chronic wounds on foot. He was found to be hypotensive on field 60/30. He was given 3L bolus in ED after which BP was still low 70/30. Patient denies fever. As per AL records no h/o abdominal pain, nausea, vomiting, diarrhea, cough.  (17 Jan 2022 15:23)      INTERIM EVENTS:Patient seen at bedside interim events noted.      PMH -reviewed admission note, no change since admission  Heart Failure: Acute [ ] Chronic [ ] Acute on Chronic [ ] Diastolic [ ] Systolic [ ] Combined Systolic and Diastolic[ ]  OREN[ ]  ATN[ ]  CKD I [ ] CKDII [ ] CKD III [ ] CKD IV [ ] CKD V [ ] ESRD[ ]  HTN[ ] CVA[ ] DM[ ] COPD[ ] COVID[ ] AF[ ]  PPM[ ] ICD[ ]    MEDICATIONS  (STANDING):  apixaban 5 milliGRAM(s) Oral two times a day  aspirin  chewable 81 milliGRAM(s) Oral daily  atorvastatin 40 milliGRAM(s) Oral at bedtime  collagenase Ointment 1 Application(s) Topical daily  finasteride 5 milliGRAM(s) Oral daily  influenza  Vaccine (HIGH DOSE) 0.7 milliLiter(s) IntraMuscular once  insulin lispro (ADMELOG) corrective regimen sliding scale   SubCutaneous every 6 hours  metoprolol tartrate 12.5 milliGRAM(s) Oral every 12 hours  mirtazapine 7.5 milliGRAM(s) Oral daily  nystatin Ointment 1 Application(s) Topical two times a day  pantoprazole    Tablet 40 milliGRAM(s) Oral before breakfast  sodium chloride 0.9%. 1000 milliLiter(s) (75 mL/Hr) IV Continuous <Continuous>  tamsulosin 0.4 milliGRAM(s) Oral at bedtime    MEDICATIONS  (PRN):  acetaminophen     Tablet .. 650 milliGRAM(s) Oral every 6 hours PRN Temp greater or equal to 38C (100.4F), Mild Pain (1 - 3)  ondansetron Injectable 4 milliGRAM(s) IV Push every 8 hours PRN Nausea and/or Vomiting            REVIEW OF SYSTEMS:  Constitutional: [ ] fever, [ ]weight loss,  [ ]fatigue  Eyes: [ ] visual changes  Respiratory: [ ]shortness of breath;  [ ] cough, [ ]wheezing, [ ]chills, [ ]hemoptysis  Cardiovascular: [ ] chest pain, [ ]palpitations, [ ]dizziness,  [ ]leg swelling[ ]orthopnea[ ]PND  Gastrointestinal: [ ] abdominal pain, [ ]nausea, [ ]vomiting,  [ ]diarrhea [ ]Constipation [ ]Melena  Genitourinary: [ ] dysuria, [ ] hematuria [ ]Molina  Neurologic: [ ] headaches [ ] tremors[ ]weakness [ ]Paralysis Right[ ] Left[ ]  Skin: [ ] itching, [ ]burning, [ ] rashes  Endocrine: [ ] heat or cold intolerance  Musculoskeletal: [ ] joint pain or swelling; [ ] muscle, back, or extremity pain  Psychiatric: [ ] depression, [ ]anxiety, [ ]mood swings, or [ ]difficulty sleeping  Hematologic: [ ] easy bruising, [ ] bleeding gums    [ ] All remaining systems negative except as per above.   [ ]Unable to obtain.  [x] No change in ROS since admission      Vital Signs Last 24 Hrs  T(C): 36.5 (05 Feb 2022 20:21), Max: 36.5 (05 Feb 2022 13:21)  T(F): 97.7 (05 Feb 2022 20:21), Max: 97.7 (05 Feb 2022 13:21)  HR: 93 (05 Feb 2022 20:21) (93 - 94)  BP: 111/56 (05 Feb 2022 20:21) (111/56 - 147/78)  BP(mean): --  RR: 16 (05 Feb 2022 20:21) (16 - 18)  SpO2: 96% (05 Feb 2022 20:21) (95% - 99%)  I&O's Summary    04 Feb 2022 07:01  -  05 Feb 2022 07:00  --------------------------------------------------------  IN: 900 mL / OUT: 600 mL / NET: 300 mL      PHYSICAL EXAM:  General: No acute distress BMI-  HEENT: EOMI, PERRL  Neck: Supple, [ ] JVD  Lungs: Equal air entry bilaterally; [ ] rales [ ] wheezing [ ] rhonchi  Heart: Regular rate and rhythm; [ ] murmur   /6 [ ] systolic [ ] diastolic [ ] radiation[ ] rubs [ ]  gallops  Abdomen: Nontender, bowel sounds present  Extremities: No clubbing, cyanosis, [ ] edema [ ]Pulses  equal and intact  Nervous system:  Alert & Oriented X3, no focal deficits  Psychiatric: Normal affect  Skin: No rashes or lesions    LABS:  02-05    134<L>  |  100  |  20<H>  ----------------------------<  103<H>  3.6   |  25  |  0.97    Ca    7.7<L>      05 Feb 2022 06:54      Creatinine Trend: 0.97<--, 1.06<--, 1.13<--, 1.23<--, 1.21<--, 1.33<--                        11.2   6.95  )-----------( 86       ( 05 Feb 2022 06:54 )             32.8     Cardiac Enzymes:

## 2022-02-05 NOTE — PROGRESS NOTE ADULT - ASSESSMENT
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 Patient received 3L bolus in ED, s/p BP was still low 70/30s. Patient admitted to ICU for septic shock requiring pressors.     #Septic shock 2/2 ulcers vs UTI  #Delirium  #Acute renal failure  #Penile ulcer  #Sacral ulcer  #Diabetic foot ulcer  #Elevated INR  #Elevated LFTs

## 2022-02-05 NOTE — PROGRESS NOTE ADULT - ASSESSMENT
1. Dysphagia  2. Weight loss  3. Failure to thrive  4. R/o cholecystitis    Suggestions:    1. Abdominal sonogram  2. Monitor electrolytes  3. IVF hydration  4. Protonix daily  5. Avoid NSAID  6. Peg placement on 2/7/22Awaiting CT Scan of abdomen and pelvis with po contrast  7. DVT prophylaxis

## 2022-02-05 NOTE — PROGRESS NOTE ADULT - SUBJECTIVE AND OBJECTIVE BOX
[   ] ICU                                          [   ] CCU                                      [ X  ] Medical Floor    Patient is a 76 year old male with dysphagia, weight loss and failure to thrive. GI consulted for Peg tube placement.     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 Patient is alert and oriented on encounter. Patient states that he cut his foot and his back was hurting. Patient is unable to provide much history due to discomfort. Patient has stage 3 ulcers on sacrum, penile ulcer. Also has chronic wounds on foot. He was found to be hypotensive on field 60/30. He was given 3L bolus in ED after which BP was still low 70/30. Patient denies fever. As per AL records no h/o abdominal pain, nausea, vomiting, diarrhea, cough.     Patient appears comfortable. No new complaints reported, No abdominal pain, N/V, hematemesis, hematochezia, melena, fever, chills, chest pain, SOB, cough or diarrhea reported.      PAIN MANAGEMENT:  Pain Scale:                 /10  Pain Location:      Prior Colonoscopy:  Unknown    PAST MEDICAL HISTORY  HTN (hypertension)  HLD (hyperlipidemia)  DM (diabetes mellitus)  CAD (coronary artery disease)  Glaucoma, angle-closure  [place] (hard of hearing)        PAST SURGICAL HISTORY  CABG (coronary artery bypass graft)  Thyroid surgery        Allergies    No Known Allergies    Intolerances  None     SOCIAL HISTORY  Advanced Directives:       [ X ] Full Code       [  ] DNR  Marital Status:         [  ] M      [ X ] S      [  ] D       [  ] W  Children:       [ X ] Yes      [  ] No  Occupation:        [  ] Employed       [ X ] Unemployed       [  ] Retired  Diet:       [ X ] Regular       [  ] PEG feeding          [  ] NG tube feeding  Drug Use:           [ X ] Patient denied          [  ] Yes  Alcohol:           [ X ] No             [  ] Yes (socially)         [  ] Yes (chronic)  Tobacco:           [  ] Yes           [ X ] No      FAMILY HISTORY  [ X ] Heart Disease            [ X ] Diabetes             [ X ] HTN             [  ] Colon Cancer             [  ] Stomach Cancer              [  ] Pancreatic Cancer        VITALS  Vital Signs Last 24 Hrs  T(C): 36.5 (05 Feb 2022 13:21), Max: 36.5 (04 Feb 2022 13:36)  T(F): 97.7 (05 Feb 2022 13:21), Max: 97.7 (04 Feb 2022 13:36)  HR: 93 (05 Feb 2022 13:21) (93 - 97)  BP: 136/72 (05 Feb 2022 13:21) (136/72 - 147/78)   RR: 18 (05 Feb 2022 13:21) (16 - 18)  SpO2: 95% (05 Feb 2022 13:21) (95% - 100%)       MEDICATIONS  (STANDING):  apixaban 5 milliGRAM(s) Oral two times a day  aspirin  chewable 81 milliGRAM(s) Oral daily  atorvastatin 40 milliGRAM(s) Oral at bedtime  collagenase Ointment 1 Application(s) Topical daily  finasteride 5 milliGRAM(s) Oral daily  influenza  Vaccine (HIGH DOSE) 0.7 milliLiter(s) IntraMuscular once  insulin lispro (ADMELOG) corrective regimen sliding scale   SubCutaneous every 6 hours  metoprolol tartrate 12.5 milliGRAM(s) Oral every 12 hours  mirtazapine 7.5 milliGRAM(s) Oral daily  nystatin Ointment 1 Application(s) Topical two times a day  pantoprazole    Tablet 40 milliGRAM(s) Oral before breakfast  sodium chloride 0.9%. 1000 milliLiter(s) (75 mL/Hr) IV Continuous <Continuous>  tamsulosin 0.4 milliGRAM(s) Oral at bedtime    MEDICATIONS  (PRN):  acetaminophen     Tablet .. 650 milliGRAM(s) Oral every 6 hours PRN Temp greater or equal to 38C (100.4F), Mild Pain (1 - 3)  ondansetron Injectable 4 milliGRAM(s) IV Push every 8 hours PRN Nausea and/or Vomiting                            11.2   6.95  )-----------( 86       ( 05 Feb 2022 06:54 )             32.8       02-05    134<L>  |  100  |  20<H>  ----------------------------<  103<H>  3.6   |  25  |  0.97    Ca    7.7<L>      05 Feb 2022 06:54      ACC: [id number] EXAM:  CT ABDOMEN AND PELVIS OC                          PROCEDURE DATE:  02/04/2022          INTERPRETATION:  CLINICAL INFORMATION: Hypotension, decreased p.o. intake.    COMPARISON: Abdominal x-ray 10/16/2021. CT abdomen pelvis 9/27/2021.    CONTRAST/COMPLICATIONS:  IV Contrast: NONE  Oral Contrast: Omnipaque 300  Complications: None reported at time of study completion    PROCEDURE:  CT of the Abdomen and Pelvis was performed.  Sagittal and coronal reformats were performed.    FINDINGS:    LOWER CHEST: Right lower lobe peribronchial opacities concerning for   pneumonia. Small pleural effusions with adjacent compressive atelectasis.   There is coronary artery calcification versus stenting partially imaged.   Correlate with procedural history. Median sternotomy wires are partially   imaged.    Solid organ evaluation is limited due to noncontrast technique.    LIVER: Liver size within normal limits. Periportal edema.  BILE DUCTS: Intrahepatic biliary tree is suboptimally assessed due to   significant streak artifact, adjacent gallbladder edema, and periportal   edema. Subcentimeter nonspecific calculus at the spencer hepatis, image 34   series 2. No clear common bile duct distention. Consider MRCP for further   characterization.  GALLBLADDER: Significantly distended with wall thickening, surrounding   fluid and internal high density material which could reflect stones or   other blood products. Correlate with right upper quadrant ultrasound.  SPLEEN: Normal size.  PANCREAS: No main pancreatic ductal dilatation.  ADRENALS: Unremarkable.  KIDNEYS/URETERS: No hydronephrosis or obstructing calculus. 3 mm   nonobstructing left renal calculus.    BLADDER: Underdistended with internal air secondary to presence of Molina   catheter. Mild mural thickening of the bladder may be due to   underdistention. Bladder calculi, image 134 series 2.  REPRODUCTIVE ORGANS: Prostate size within normal limits. Few coarse   calcifications of the prostate.    BOWEL: Enteric tube is coiled in the stomach, pointing upwards at the   gastric fundus, recommend repositioning. No small bowel distention. The   appendix is nondistended. Mild stool burden of the colon limits   evaluation of the colonic mucosa.  PERITONEUM: No ascites. No loculated fluid collection.  VESSELS: No aneurysm of the abdominal aorta. Aortoiliac, proximal   femoral, and visceral artery atheromatous changes.  RETROPERITONEUM/LYMPH NODES: Small volume nodes.  ABDOMINAL WALL: Soft tissue edema. Small fat-containingleft inguinal   hernia. Question developing sacral decubitus ulceration, image 142 series   2. Correlate with visualization.  BONES: Osseous demineralization and degenerative change of the bones.   Numerous bridging bulky anterior osteophytes. Central canal and neural   foramina are not adequately assessed on this study.    IMPRESSION:    Limited study without IV contrast.    Gallbladder findings concerning for acute cholecystitis. Correlate with   ultrasound. Limited evaluation of the biliary tree as above.    No bowel obstruction. Enteric tube is coiled in the stomach, pointing   upwards at the gastric fundus, recommend repositioning.    Right lower lobe peribronchial opacities concerning for pneumonia. Small   pleural effusions with adjacent compressive atelectasis.    No hydronephrosis or obstructing calculus. Nonobstructing left renal   calculus and bladder calculi. Correlate with urinalysis.    Question developing sacral decubitus ulceration, image 142 series 2.   Correlate with visualization.

## 2022-02-06 LAB
ANION GAP SERPL CALC-SCNC: 10 MMOL/L — SIGNIFICANT CHANGE UP (ref 5–17)
BUN SERPL-MCNC: 24 MG/DL — HIGH (ref 7–18)
CALCIUM SERPL-MCNC: 7.6 MG/DL — LOW (ref 8.4–10.5)
CHLORIDE SERPL-SCNC: 106 MMOL/L — SIGNIFICANT CHANGE UP (ref 96–108)
CO2 SERPL-SCNC: 23 MMOL/L — SIGNIFICANT CHANGE UP (ref 22–31)
CREAT SERPL-MCNC: 0.94 MG/DL — SIGNIFICANT CHANGE UP (ref 0.5–1.3)
GLUCOSE BLDC GLUCOMTR-MCNC: 101 MG/DL — HIGH (ref 70–99)
GLUCOSE BLDC GLUCOMTR-MCNC: 102 MG/DL — HIGH (ref 70–99)
GLUCOSE BLDC GLUCOMTR-MCNC: 83 MG/DL — SIGNIFICANT CHANGE UP (ref 70–99)
GLUCOSE BLDC GLUCOMTR-MCNC: 91 MG/DL — SIGNIFICANT CHANGE UP (ref 70–99)
GLUCOSE BLDC GLUCOMTR-MCNC: 94 MG/DL — SIGNIFICANT CHANGE UP (ref 70–99)
GLUCOSE SERPL-MCNC: 90 MG/DL — SIGNIFICANT CHANGE UP (ref 70–99)
HCT VFR BLD CALC: 31.9 % — LOW (ref 39–50)
HGB BLD-MCNC: 10.6 G/DL — LOW (ref 13–17)
MCHC RBC-ENTMCNC: 28.6 PG — SIGNIFICANT CHANGE UP (ref 27–34)
MCHC RBC-ENTMCNC: 33.2 GM/DL — SIGNIFICANT CHANGE UP (ref 32–36)
MCV RBC AUTO: 86 FL — SIGNIFICANT CHANGE UP (ref 80–100)
NRBC # BLD: 0 /100 WBCS — SIGNIFICANT CHANGE UP (ref 0–0)
PLATELET # BLD AUTO: 113 K/UL — LOW (ref 150–400)
POTASSIUM SERPL-MCNC: 3.9 MMOL/L — SIGNIFICANT CHANGE UP (ref 3.5–5.3)
POTASSIUM SERPL-SCNC: 3.9 MMOL/L — SIGNIFICANT CHANGE UP (ref 3.5–5.3)
RBC # BLD: 3.71 M/UL — LOW (ref 4.2–5.8)
RBC # FLD: 15.3 % — HIGH (ref 10.3–14.5)
SODIUM SERPL-SCNC: 139 MMOL/L — SIGNIFICANT CHANGE UP (ref 135–145)
WBC # BLD: 8.26 K/UL — SIGNIFICANT CHANGE UP (ref 3.8–10.5)
WBC # FLD AUTO: 8.26 K/UL — SIGNIFICANT CHANGE UP (ref 3.8–10.5)

## 2022-02-06 RX ADMIN — SODIUM CHLORIDE 75 MILLILITER(S): 9 INJECTION INTRAMUSCULAR; INTRAVENOUS; SUBCUTANEOUS at 06:23

## 2022-02-06 RX ADMIN — ONDANSETRON 4 MILLIGRAM(S): 8 TABLET, FILM COATED ORAL at 03:43

## 2022-02-06 RX ADMIN — Medication 0: at 06:50

## 2022-02-06 RX ADMIN — NYSTATIN CREAM 1 APPLICATION(S): 100000 CREAM TOPICAL at 17:48

## 2022-02-06 RX ADMIN — Medication 1 APPLICATION(S): at 11:29

## 2022-02-06 RX ADMIN — Medication 0: at 00:52

## 2022-02-06 RX ADMIN — NYSTATIN CREAM 1 APPLICATION(S): 100000 CREAM TOPICAL at 05:18

## 2022-02-06 NOTE — PROGRESS NOTE ADULT - ASSESSMENT
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 Patient received 3L bolus in ED, s/p BP was still low 70/30s. Patient admitted to ICU for septic shock requiring pressors.     #Septic shock 2/2 ulcers vs UTI  #Delirium  #Acute renal failure  #Penile ulcer  #Sacral ulcer  #Diabetic foot ulcer  #Elevated INR  #Elevated LFTs

## 2022-02-06 NOTE — PROGRESS NOTE ADULT - SUBJECTIVE AND OBJECTIVE BOX
DATE OF SERVICE:  2/6/2022  Patient was seen and examined on   2/6/2022  .Interim events noted.Consultant notes ,Labs,Telemetry reviewed by me    PRESENTING CC: lethargy and hypotention    HPI and HOSPITAL COURSE: HPI:  77 yo male from 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 Patient is alert and oriented on encounter. Patient states that he cut his foot and his back was hurting. Patient is unable to provide much history due to discomfort. Patient has stage 3 ulcers on sacrum, penile ulcer. Also has chronic wounds on foot. He was found to be hypotensive on field 60/30. He was given 3L bolus in ED after which BP was still low 70/30. Patient denies fever. As per AL records no h/o abdominal pain, nausea, vomiting, diarrhea, cough.  (17 Jan 2022 15:23)      INTERIM EVENTS:Patient seen at bedside interim events noted.      PMH -reviewed admission note, no change since admission  Heart Failure: Acute [ ] Chronic [ ] Acute on Chronic [ ] Diastolic [ ] Systolic [ ] Combined Systolic and Diastolic[ ]  OREN[ ]  ATN[ ]  CKD I [ ] CKDII [ ] CKD III [ ] CKD IV [ ] CKD V [ ] ESRD[ ]  HTN[ ] CVA[ ] DM[ ] COPD[ ] COVID[ ] AF[ ]  PPM[ ] ICD[ ]    MEDICATIONS  (STANDING):  apixaban 5 milliGRAM(s) Oral two times a day  aspirin  chewable 81 milliGRAM(s) Oral daily  atorvastatin 40 milliGRAM(s) Oral at bedtime  collagenase Ointment 1 Application(s) Topical daily  finasteride 5 milliGRAM(s) Oral daily  influenza  Vaccine (HIGH DOSE) 0.7 milliLiter(s) IntraMuscular once  insulin lispro (ADMELOG) corrective regimen sliding scale   SubCutaneous every 6 hours  metoprolol tartrate 12.5 milliGRAM(s) Oral every 12 hours  mirtazapine 7.5 milliGRAM(s) Oral daily  nystatin Ointment 1 Application(s) Topical two times a day  pantoprazole    Tablet 40 milliGRAM(s) Oral before breakfast  sodium chloride 0.9%. 1000 milliLiter(s) (75 mL/Hr) IV Continuous <Continuous>  tamsulosin 0.4 milliGRAM(s) Oral at bedtime    MEDICATIONS  (PRN):  acetaminophen     Tablet .. 650 milliGRAM(s) Oral every 6 hours PRN Temp greater or equal to 38C (100.4F), Mild Pain (1 - 3)  ondansetron Injectable 4 milliGRAM(s) IV Push every 8 hours PRN Nausea and/or Vomiting            REVIEW OF SYSTEMS:  Constitutional: [ ] fever, [ ]weight loss,  [ ]fatigue  Eyes: [ ] visual changes  Respiratory: [ ]shortness of breath;  [ ] cough, [ ]wheezing, [ ]chills, [ ]hemoptysis  Cardiovascular: [ ] chest pain, [ ]palpitations, [ ]dizziness,  [ ]leg swelling[ ]orthopnea[ ]PND  Gastrointestinal: [ ] abdominal pain, [ ]nausea, [ ]vomiting,  [ ]diarrhea [ ]Constipation [ ]Melena  Genitourinary: [ ] dysuria, [ ] hematuria [ ]Molina  Neurologic: [ ] headaches [ ] tremors[ ]weakness [ ]Paralysis Right[ ] Left[ ]  Skin: [ ] itching, [ ]burning, [ ] rashes  Endocrine: [ ] heat or cold intolerance  Musculoskeletal: [ ] joint pain or swelling; [ ] muscle, back, or extremity pain  Psychiatric: [ ] depression, [ ]anxiety, [ ]mood swings, or [ ]difficulty sleeping  Hematologic: [ ] easy bruising, [ ] bleeding gums    [ ] All remaining systems negative except as per above.   [ ]Unable to obtain.  [x] No change in ROS since admission      Vital Signs Last 24 Hrs  T(C): 36.6 (06 Feb 2022 05:27), Max: 36.6 (06 Feb 2022 05:27)  T(F): 97.9 (06 Feb 2022 05:27), Max: 97.9 (06 Feb 2022 05:27)  HR: 97 (06 Feb 2022 05:27) (93 - 97)  BP: 113/62 (06 Feb 2022 05:27) (111/56 - 136/72)  BP(mean): --  RR: 18 (06 Feb 2022 05:27) (16 - 18)  SpO2: 100% (06 Feb 2022 05:27) (95% - 100%)  I&O's Summary    05 Feb 2022 07:01  -  06 Feb 2022 07:00  --------------------------------------------------------  IN: 0 mL / OUT: 200 mL / NET: -200 mL        PHYSICAL EXAM:  General: No acute distress BMI-  HEENT: EOMI, PERRL  Neck: Supple, [ ] JVD  Lungs: Equal air entry bilaterally; [ ] rales [ ] wheezing [ ] rhonchi  Heart: Regular rate and rhythm; [ ] murmur   /6 [ ] systolic [ ] diastolic [ ] radiation[ ] rubs [ ]  gallops  Abdomen: Nontender, bowel sounds present  Extremities: No clubbing, cyanosis, [ ] edema [ ]Pulses  equal and intact  Nervous system:  Alert & Oriented X3, no focal deficits  Psychiatric: Normal affect  Skin: No rashes or lesions    LABS:  02-06    139  |  106  |  24<H>  ----------------------------<  90  3.9   |  23  |  0.94    Ca    7.6<L>      06 Feb 2022 07:06      Creatinine Trend: 0.94<--, 0.97<--, 1.06<--, 1.13<--, 1.23<--, 1.21<--                        10.6   8.26  )-----------( 113      ( 06 Feb 2022 07:06 )             31.9         Cardiac Enzymes:

## 2022-02-06 NOTE — PROGRESS NOTE ADULT - SUBJECTIVE AND OBJECTIVE BOX
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 Patient received 3L bolus in ED, s/p BP was still low 70/30s. Patient admitted to ICU for septic shock requiring pressors.     INTERVAL HPI/OVERNIGHT EVENTS:   No interval changes noted    REVIEW OF SYSTEMS:  CONSTITUTIONAL: No fever, weight loss, or fatigue  RESPIRATORY: No cough, wheezing, chills or hemoptysis; No shortness of breath  CARDIOVASCULAR: No chest pain, palpitations, dizziness, or leg swelling  GASTROINTESTINAL: No abdominal pain. No nausea, vomiting, or hematemesis; No diarrhea or constipation. No melena or hematochezia.  GENITOURINARY: No dysuria or hematuria, urinary frequency  NEUROLOGICAL: No headaches, memory loss, loss of strength, numbness, or tremors  SKIN: No itching, burning, rashes, or lesions     MEDICATIONS  (STANDING):  apixaban 5 milliGRAM(s) Oral two times a day  aspirin  chewable 81 milliGRAM(s) Oral daily  atorvastatin 40 milliGRAM(s) Oral at bedtime  collagenase Ointment 1 Application(s) Topical daily  dextrose 5%. 1000 milliLiter(s) (75 mL/Hr) IV Continuous <Continuous>  finasteride 5 milliGRAM(s) Oral daily  influenza  Vaccine (HIGH DOSE) 0.7 milliLiter(s) IntraMuscular once  insulin lispro (ADMELOG) corrective regimen sliding scale   SubCutaneous every 6 hours  metoprolol tartrate 12.5 milliGRAM(s) Oral every 12 hours  mirtazapine 7.5 milliGRAM(s) Oral daily  nystatin Ointment 1 Application(s) Topical two times a day  pantoprazole    Tablet 40 milliGRAM(s) Oral before breakfast  tamsulosin 0.4 milliGRAM(s) Oral at bedtime    MEDICATIONS  (PRN):  acetaminophen     Tablet .. 650 milliGRAM(s) Oral every 6 hours PRN Temp greater or equal to 38C (100.4F), Mild Pain (1 - 3)  ondansetron Injectable 4 milliGRAM(s) IV Push every 8 hours PRN Nausea and/or Vomiting      Vital Signs Last 24 Hrs  T(C): 36.8 (02 Feb 2022 13:59), Max: 37.2 (01 Feb 2022 20:30)  T(F): 98.3 (02 Feb 2022 13:59), Max: 98.9 (01 Feb 2022 20:30)  HR: 88 (02 Feb 2022 13:59) (70 - 114)  BP: 115/66 (02 Feb 2022 13:59) (115/66 - 142/76)  BP(mean): --  RR: 15 (02 Feb 2022 13:59) (15 - 20)  SpO2: 100% (02 Feb 2022 13:59) (97% - 100%)    PHYSICAL EXAMINATION:  General: No acute distress  HEENT: EOMI, PERRL  Neck: Supple, [ ] JVD  Lungs: decreased breath sounds   Heart: Regular rate and rhythm  Abdomen: Nontender, bowel sounds present  Extremities: No clubbing, cyanosis, edema  Nervous system:  Alert & Oriented X3, no focal deficits  Psychiatric: Normal affect  Skin: No rashes or lesions                          7.1    5.49  )-----------( 102      ( 02 Feb 2022 05:31 )             21.0     02-02    137  |  104  |  25<H>  ----------------------------<  138<H>  3.4<L>   |  24  |  1.23    Ca    7.6<L>      02 Feb 2022 05:31                I&O's Summary    01 Feb 2022 07:01  -  02 Feb 2022 07:00  --------------------------------------------------------  IN: 75 mL / OUT: 600 mL / NET: -525 mL    02 Feb 2022 07:01  -  02 Feb 2022 16:14  --------------------------------------------------------  IN: 0 mL / OUT: 200 mL / NET: -200 mL            CAPILLARY BLOOD GLUCOSE      RADIOLOGY & ADDITIONAL TESTS:

## 2022-02-06 NOTE — PROGRESS NOTE ADULT - PROBLEM SELECTOR PLAN 1
around 4.2, improving  Patient has normal creatinine at baseline, 1.18 Nov 2021  Presented with elevated BUN/cr of 51/4.45 improving  Most likely ATN from hypoperfusion   S/p 6L bolus, No more fluids for now  Will monitor UO and BMP  Nephro on board, Dr. Herrera    Pall care on board, patient eligible for hospice but patient remains full code for now, daughter wants to talk to pt's son  family considering PEG tube  GI consulted Dr Viera for PEG tube eval, s/p attempt patient with partial outlet obstruction  CT A/P to r/o obstruction with PO contrast, negative  possible PEG Monday by Dr Viera    Daughter and son continue to express patient code status as full code

## 2022-02-06 NOTE — PROGRESS NOTE ADULT - SUBJECTIVE AND OBJECTIVE BOX
[   ] ICU                                          [   ] CCU                                      [ X  ] Medical Floor    Patient is a 76 year old male with dysphagia, weight loss and failure to thrive. GI consulted for Peg tube placement.     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 Patient is alert and oriented on encounter. Patient states that he cut his foot and his back was hurting. Patient is unable to provide much history due to discomfort. Patient has stage 3 ulcers on sacrum, penile ulcer. Also has chronic wounds on foot. He was found to be hypotensive on field 60/30. He was given 3L bolus in ED after which BP was still low 70/30. Patient denies fever. As per AL records no h/o abdominal pain, nausea, vomiting, diarrhea, cough.     Patient appears comfortable. No new complaints reported, No abdominal pain, N/V, hematemesis, hematochezia, melena, fever, chills, chest pain, SOB, cough or diarrhea reported.      PAIN MANAGEMENT:  Pain Scale:                 /10  Pain Location:      Prior Colonoscopy:  Unknown    PAST MEDICAL HISTORY  HTN (hypertension)  HLD (hyperlipidemia)  DM (diabetes mellitus)  CAD (coronary artery disease)  Glaucoma, angle-closure  [place] (hard of hearing)        PAST SURGICAL HISTORY  CABG (coronary artery bypass graft)  Thyroid surgery        Allergies    No Known Allergies    Intolerances  None     SOCIAL HISTORY  Advanced Directives:       [ X ] Full Code       [  ] DNR  Marital Status:         [  ] M      [ X ] S      [  ] D       [  ] W  Children:       [ X ] Yes      [  ] No  Occupation:        [  ] Employed       [ X ] Unemployed       [  ] Retired  Diet:       [ X ] Regular       [  ] PEG feeding          [  ] NG tube feeding  Drug Use:           [ X ] Patient denied          [  ] Yes  Alcohol:           [ X ] No             [  ] Yes (socially)         [  ] Yes (chronic)  Tobacco:           [  ] Yes           [ X ] No      FAMILY HISTORY  [ X ] Heart Disease            [ X ] Diabetes             [ X ] HTN             [  ] Colon Cancer             [  ] Stomach Cancer              [  ] Pancreatic Cancer      VITALS  Vital Signs Last 24 Hrs  T(C): 36.6 (06 Feb 2022 05:27), Max: 36.6 (06 Feb 2022 05:27)  T(F): 97.9 (06 Feb 2022 05:27), Max: 97.9 (06 Feb 2022 05:27)  HR: 97 (06 Feb 2022 05:27) (93 - 97)  BP: 113/62 (06 Feb 2022 05:27) (111/56 - 136/72)   RR: 18 (06 Feb 2022 05:27) (16 - 18)  SpO2: 100% (06 Feb 2022 05:27) (95% - 100%)       MEDICATIONS  (STANDING):  apixaban 5 milliGRAM(s) Oral two times a day  aspirin  chewable 81 milliGRAM(s) Oral daily  atorvastatin 40 milliGRAM(s) Oral at bedtime  collagenase Ointment 1 Application(s) Topical daily  finasteride 5 milliGRAM(s) Oral daily  influenza  Vaccine (HIGH DOSE) 0.7 milliLiter(s) IntraMuscular once  insulin lispro (ADMELOG) corrective regimen sliding scale   SubCutaneous every 6 hours  metoprolol tartrate 12.5 milliGRAM(s) Oral every 12 hours  mirtazapine 7.5 milliGRAM(s) Oral daily  nystatin Ointment 1 Application(s) Topical two times a day  pantoprazole    Tablet 40 milliGRAM(s) Oral before breakfast  sodium chloride 0.9%. 1000 milliLiter(s) (75 mL/Hr) IV Continuous <Continuous>  tamsulosin 0.4 milliGRAM(s) Oral at bedtime    MEDICATIONS  (PRN):  acetaminophen     Tablet .. 650 milliGRAM(s) Oral every 6 hours PRN Temp greater or equal to 38C (100.4F), Mild Pain (1 - 3)  ondansetron Injectable 4 milliGRAM(s) IV Push every 8 hours PRN Nausea and/or Vomiting                            10.6   8.26  )-----------( 113      ( 06 Feb 2022 07:06 )             31.9       02-06    139  |  106  |  24<H>  ----------------------------<  90  3.9   |  23  |  0.94    Ca    7.6<L>      06 Feb 2022 07:06

## 2022-02-06 NOTE — PROGRESS NOTE ADULT - ASSESSMENT
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 Patient received 3L bolus in ED, s/p BP was still low 70/30s. Patient admitted to ICU for septic shock requiring pressors.     #Septic shock 2/2 ulcers vs UTI  #Delirium  #Acute renal failure  #Penile ulcer  #Sacral ulcer  #Diabetic foot ulcer  #Elevated INR  #Elevated LFTs

## 2022-02-07 LAB
ALBUMIN SERPL ELPH-MCNC: 1.7 G/DL — LOW (ref 3.5–5)
ALP SERPL-CCNC: 115 U/L — SIGNIFICANT CHANGE UP (ref 40–120)
ALT FLD-CCNC: 11 U/L DA — SIGNIFICANT CHANGE UP (ref 10–60)
ANION GAP SERPL CALC-SCNC: 12 MMOL/L — SIGNIFICANT CHANGE UP (ref 5–17)
ANION GAP SERPL CALC-SCNC: 13 MMOL/L — SIGNIFICANT CHANGE UP (ref 5–17)
AST SERPL-CCNC: 24 U/L — SIGNIFICANT CHANGE UP (ref 10–40)
BILIRUB SERPL-MCNC: 0.8 MG/DL — SIGNIFICANT CHANGE UP (ref 0.2–1.2)
BUN SERPL-MCNC: 30 MG/DL — HIGH (ref 7–18)
BUN SERPL-MCNC: 31 MG/DL — HIGH (ref 7–18)
CALCIUM SERPL-MCNC: 7.4 MG/DL — LOW (ref 8.4–10.5)
CALCIUM SERPL-MCNC: 7.6 MG/DL — LOW (ref 8.4–10.5)
CHLORIDE SERPL-SCNC: 109 MMOL/L — HIGH (ref 96–108)
CHLORIDE SERPL-SCNC: 110 MMOL/L — HIGH (ref 96–108)
CO2 SERPL-SCNC: 18 MMOL/L — LOW (ref 22–31)
CO2 SERPL-SCNC: 19 MMOL/L — LOW (ref 22–31)
CREAT SERPL-MCNC: 0.98 MG/DL — SIGNIFICANT CHANGE UP (ref 0.5–1.3)
CREAT SERPL-MCNC: 1.01 MG/DL — SIGNIFICANT CHANGE UP (ref 0.5–1.3)
GLUCOSE BLDC GLUCOMTR-MCNC: 76 MG/DL — SIGNIFICANT CHANGE UP (ref 70–99)
GLUCOSE BLDC GLUCOMTR-MCNC: 80 MG/DL — SIGNIFICANT CHANGE UP (ref 70–99)
GLUCOSE BLDC GLUCOMTR-MCNC: 83 MG/DL — SIGNIFICANT CHANGE UP (ref 70–99)
GLUCOSE BLDC GLUCOMTR-MCNC: 89 MG/DL — SIGNIFICANT CHANGE UP (ref 70–99)
GLUCOSE BLDC GLUCOMTR-MCNC: 90 MG/DL — SIGNIFICANT CHANGE UP (ref 70–99)
GLUCOSE SERPL-MCNC: 79 MG/DL — SIGNIFICANT CHANGE UP (ref 70–99)
GLUCOSE SERPL-MCNC: 82 MG/DL — SIGNIFICANT CHANGE UP (ref 70–99)
HCT VFR BLD CALC: 31.4 % — LOW (ref 39–50)
HGB BLD-MCNC: 10.8 G/DL — LOW (ref 13–17)
MCHC RBC-ENTMCNC: 29.2 PG — SIGNIFICANT CHANGE UP (ref 27–34)
MCHC RBC-ENTMCNC: 34.4 GM/DL — SIGNIFICANT CHANGE UP (ref 32–36)
MCV RBC AUTO: 84.9 FL — SIGNIFICANT CHANGE UP (ref 80–100)
NRBC # BLD: 0 /100 WBCS — SIGNIFICANT CHANGE UP (ref 0–0)
PLATELET # BLD AUTO: 113 K/UL — LOW (ref 150–400)
POTASSIUM SERPL-MCNC: 3.3 MMOL/L — LOW (ref 3.5–5.3)
POTASSIUM SERPL-MCNC: 3.5 MMOL/L — SIGNIFICANT CHANGE UP (ref 3.5–5.3)
POTASSIUM SERPL-SCNC: 3.3 MMOL/L — LOW (ref 3.5–5.3)
POTASSIUM SERPL-SCNC: 3.5 MMOL/L — SIGNIFICANT CHANGE UP (ref 3.5–5.3)
PROT SERPL-MCNC: 4.9 G/DL — LOW (ref 6–8.3)
RBC # BLD: 3.7 M/UL — LOW (ref 4.2–5.8)
RBC # FLD: 16 % — HIGH (ref 10.3–14.5)
SODIUM SERPL-SCNC: 140 MMOL/L — SIGNIFICANT CHANGE UP (ref 135–145)
SODIUM SERPL-SCNC: 141 MMOL/L — SIGNIFICANT CHANGE UP (ref 135–145)
WBC # BLD: 9.91 K/UL — SIGNIFICANT CHANGE UP (ref 3.8–10.5)
WBC # FLD AUTO: 9.91 K/UL — SIGNIFICANT CHANGE UP (ref 3.8–10.5)

## 2022-02-07 RX ADMIN — NYSTATIN CREAM 1 APPLICATION(S): 100000 CREAM TOPICAL at 06:18

## 2022-02-07 RX ADMIN — NYSTATIN CREAM 1 APPLICATION(S): 100000 CREAM TOPICAL at 17:20

## 2022-02-07 RX ADMIN — Medication 0: at 00:18

## 2022-02-07 RX ADMIN — Medication 1 APPLICATION(S): at 11:22

## 2022-02-07 RX ADMIN — Medication 0: at 06:18

## 2022-02-07 NOTE — PROGRESS NOTE ADULT - CONSTITUTIONAL DETAILS
no distress
no distress
well-groomed/no distress
no distress/cachectic
no distress/cachectic
well-groomed/no distress
no distress/cachectic

## 2022-02-07 NOTE — PROGRESS NOTE ADULT - SUBJECTIVE AND OBJECTIVE BOX
PGY-1 Progress Note discussed with attending    PAGER #: [[phone]] TILL 5:00 PM  PLEASE CONTACT ON CALL TEAM:  - On Call Team (Please refer to Dashawn) FROM 5:00 PM - 8:30PM  - Nightfloat Team FROM 8:30 -7:30 AM    HPI      INTERVAL HPI/OVERNIGHT EVENTS:   -     REVIEW OF SYSTEMS:  CONSTITUTIONAL: No fever, weight loss, or fatigue  RESPIRATORY: No cough, wheezing, chills or hemoptysis; No shortness of breath  CARDIOVASCULAR: No chest pain, palpitations, dizziness, or leg swelling  GASTROINTESTINAL: No abdominal pain. No nausea, vomiting, or hematemesis; No diarrhea or constipation. No melena or hematochezia.  GENITOURINARY: No dysuria or hematuria, urinary frequency  NEUROLOGICAL: No headaches, memory loss, loss of strength, numbness, or tremors  SKIN: No itching, burning, rashes, or lesions     MEDICATIONS  (STANDING):  apixaban 5 milliGRAM(s) Oral two times a day  aspirin  chewable 81 milliGRAM(s) Oral daily  atorvastatin 40 milliGRAM(s) Oral at bedtime  collagenase Ointment 1 Application(s) Topical daily  finasteride 5 milliGRAM(s) Oral daily  influenza  Vaccine (HIGH DOSE) 0.7 milliLiter(s) IntraMuscular once  insulin lispro (ADMELOG) corrective regimen sliding scale   SubCutaneous every 6 hours  metoprolol tartrate 12.5 milliGRAM(s) Oral every 12 hours  mirtazapine 7.5 milliGRAM(s) Oral daily  nystatin Ointment 1 Application(s) Topical two times a day  pantoprazole    Tablet 40 milliGRAM(s) Oral before breakfast  sodium chloride 0.9%. 1000 milliLiter(s) (75 mL/Hr) IV Continuous <Continuous>  tamsulosin 0.4 milliGRAM(s) Oral at bedtime    MEDICATIONS  (PRN):  acetaminophen     Tablet .. 650 milliGRAM(s) Oral every 6 hours PRN Temp greater or equal to 38C (100.4F), Mild Pain (1 - 3)  ondansetron Injectable 4 milliGRAM(s) IV Push every 8 hours PRN Nausea and/or Vomiting      Vital Signs Last 24 Hrs  T(C): 36.5 (07 Feb 2022 05:15), Max: 36.7 (06 Feb 2022 14:46)  T(F): 97.7 (07 Feb 2022 05:15), Max: 98.1 (06 Feb 2022 14:46)  HR: 101 (07 Feb 2022 05:15) (95 - 101)  BP: 109/46 (07 Feb 2022 05:15) (109/46 - 155/72)  BP(mean): --  RR: 17 (07 Feb 2022 05:15) (17 - 18)  SpO2: 100% (07 Feb 2022 05:15) (99% - 100%)    PHYSICAL EXAMINATION:  GENERAL: NAD, AAOx  HEAD: AT/NC  EYES: conjunctiva and sclera clear  NECK: supple, No JVD noted, Normal thyroid  CHEST/LUNG: CTABL; no rales, rhonchi, wheezing, or rubs  HEART: regular rate and rhythm; no murmurs, rubs, or gallops  ABDOMEN: soft, nontender, nondistended; Bowel sounds present  EXTREMITIES:  2+ Peripheral Pulses, No clubbing, cyanosis, or edema  SKIN: warm dry                          10.8   9.91  )-----------( 113      ( 07 Feb 2022 06:56 )             31.4     02-07    140  |  109<H>  |  30<H>  ----------------------------<  82  3.5   |  18<L>  |  1.01    Ca    7.6<L>      07 Feb 2022 06:56              COVID-19 PCR: NotDetec (05 Feb 2022 16:23)  COVID-19 PCR: NotDetec (05 Feb 2022 04:50)  COVID-19 PCR: NotDetec (30 Jan 2022 06:13)  COVID-19 PCR: NotDetec (24 Jan 2022 05:17)  COVID-19 PCR: NotDetec (17 Jan 2022 12:12)  COVID-19 PCR: NotDetec (01 Nov 2021 05:19)  COVID-19 PCR: NotDetec (30 Oct 2021 03:15)  COVID-19 PCR: NotDetec (29 Oct 2021 06:08)  COVID-19 PCR: NotDetec (21 Oct 2021 04:18)  COVID-19 PCR: NotDetec (17 Oct 2021 05:40)  COVID-19 PCR: NotDetec (13 Oct 2021 08:54)  COVID-19 PCR: NotDetec (11 Oct 2021 05:44)  COVID-19 PCR: NotDetec (06 Oct 2021 18:27)  COVID-19 PCR: NotDetec (02 Oct 2021 07:01)  COVID-19 PCR: NotDetec (29 Sep 2021 06:58)  COVID-19 PCR: NotDetec (21 Sep 2021 10:54)  COVID-19 PCR: NotDetec (19 Sep 2021 15:05)  COVID-19 PCR: NotDetec (16 Sep 2021 22:24)  COVID-19 PCR: NotDetec (18 Aug 2021 18:23)  COVID-19 PCR: NotDetec (14 Aug 2021 09:41)      CAPILLARY BLOOD GLUCOSE      POCT Blood Glucose.: 83 mg/dL (07 Feb 2022 11:20)  POCT Blood Glucose.: 80 mg/dL (07 Feb 2022 06:15)  POCT Blood Glucose.: 76 mg/dL (07 Feb 2022 00:15)  POCT Blood Glucose.: 83 mg/dL (06 Feb 2022 21:27)  POCT Blood Glucose.: 101 mg/dL (06 Feb 2022 17:39)      RADIOLOGY & ADDITIONAL TESTS:                   PGY-1 Progress Note discussed with attending    PAGER #: [[phone]] TILL 5:00 PM  PLEASE CONTACT ON CALL TEAM:  - On Call Team (Please refer to Dashawn) FROM 5:00 PM - 8:30PM  - Nightfloat Team FROM 8:30 -7:30 AM    INTERVAL HPI/OVERNIGHT EVENTS:   - Pt is AAOx1, understands his name. Not oriented to place or time. Does not participate in ROS.     REVIEW OF SYSTEMS:  as stated in hpi    MEDICATIONS  (STANDING):  apixaban 5 milliGRAM(s) Oral two times a day  aspirin  chewable 81 milliGRAM(s) Oral daily  atorvastatin 40 milliGRAM(s) Oral at bedtime  collagenase Ointment 1 Application(s) Topical daily  finasteride 5 milliGRAM(s) Oral daily  influenza  Vaccine (HIGH DOSE) 0.7 milliLiter(s) IntraMuscular once  insulin lispro (ADMELOG) corrective regimen sliding scale   SubCutaneous every 6 hours  metoprolol tartrate 12.5 milliGRAM(s) Oral every 12 hours  mirtazapine 7.5 milliGRAM(s) Oral daily  nystatin Ointment 1 Application(s) Topical two times a day  pantoprazole    Tablet 40 milliGRAM(s) Oral before breakfast  sodium chloride 0.9%. 1000 milliLiter(s) (75 mL/Hr) IV Continuous <Continuous>  tamsulosin 0.4 milliGRAM(s) Oral at bedtime    MEDICATIONS  (PRN):  acetaminophen     Tablet .. 650 milliGRAM(s) Oral every 6 hours PRN Temp greater or equal to 38C (100.4F), Mild Pain (1 - 3)  ondansetron Injectable 4 milliGRAM(s) IV Push every 8 hours PRN Nausea and/or Vomiting      Vital Signs Last 24 Hrs  T(C): 36.5 (07 Feb 2022 05:15), Max: 36.7 (06 Feb 2022 14:46)  T(F): 97.7 (07 Feb 2022 05:15), Max: 98.1 (06 Feb 2022 14:46)  HR: 101 (07 Feb 2022 05:15) (95 - 101)  BP: 109/46 (07 Feb 2022 05:15) (109/46 - 155/72)  BP(mean): --  RR: 17 (07 Feb 2022 05:15) (17 - 18)  SpO2: 100% (07 Feb 2022 05:15) (99% - 100%)    PHYSICAL EXAMINATION:  GENERAL: NAD, AAOx1  HEAD: AT/NC  EYES: conjunctiva and sclera clear  NECK: supple, No JVD noted, Normal thyroid  CHEST/LUNG: CTABL; no rales, rhonchi, wheezing, or rubs  HEART: regular rate and rhythm; no murmurs, rubs, or gallops  ABDOMEN: soft, nontender, nondistended; Bowel sounds present  EXTREMITIES:  2+ Peripheral Pulses, No clubbing, cyanosis, or edema  SKIN: warm dry                          10.8   9.91  )-----------( 113      ( 07 Feb 2022 06:56 )             31.4     02-07    140  |  109<H>  |  30<H>  ----------------------------<  82  3.5   |  18<L>  |  1.01    Ca    7.6<L>      07 Feb 2022 06:56              COVID-19 PCR: NotDetec (05 Feb 2022 16:23)  COVID-19 PCR: NotDetec (05 Feb 2022 04:50)  COVID-19 PCR: NotDetec (30 Jan 2022 06:13)  COVID-19 PCR: NotDetec (24 Jan 2022 05:17)  COVID-19 PCR: NotDetec (17 Jan 2022 12:12)  COVID-19 PCR: NotDetec (01 Nov 2021 05:19)  COVID-19 PCR: NotDetec (30 Oct 2021 03:15)  COVID-19 PCR: NotDetec (29 Oct 2021 06:08)  COVID-19 PCR: NotDetec (21 Oct 2021 04:18)  COVID-19 PCR: NotDetec (17 Oct 2021 05:40)  COVID-19 PCR: NotDetec (13 Oct 2021 08:54)  COVID-19 PCR: NotDetec (11 Oct 2021 05:44)  COVID-19 PCR: NotDetec (06 Oct 2021 18:27)  COVID-19 PCR: NotDetec (02 Oct 2021 07:01)  COVID-19 PCR: NotDetec (29 Sep 2021 06:58)  COVID-19 PCR: NotDetec (21 Sep 2021 10:54)  COVID-19 PCR: NotDetec (19 Sep 2021 15:05)  COVID-19 PCR: NotDetec (16 Sep 2021 22:24)  COVID-19 PCR: NotDetec (18 Aug 2021 18:23)  COVID-19 PCR: NotDetec (14 Aug 2021 09:41)      CAPILLARY BLOOD GLUCOSE      POCT Blood Glucose.: 83 mg/dL (07 Feb 2022 11:20)  POCT Blood Glucose.: 80 mg/dL (07 Feb 2022 06:15)  POCT Blood Glucose.: 76 mg/dL (07 Feb 2022 00:15)  POCT Blood Glucose.: 83 mg/dL (06 Feb 2022 21:27)  POCT Blood Glucose.: 101 mg/dL (06 Feb 2022 17:39)      RADIOLOGY & ADDITIONAL TESTS:

## 2022-02-07 NOTE — PROGRESS NOTE ADULT - SUBJECTIVE AND OBJECTIVE BOX
[   ] ICU                                          [   ] CCU                                      [  X ] Medical Floor    Patient is a 76 year old male with dysphagia, weight loss and failure to thrive. GI consulted for Peg tube placement.     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 Patient is alert and oriented on encounter. Patient states that he cut his foot and his back was hurting. Patient is unable to provide much history due to discomfort. Patient has stage 3 ulcers on sacrum, penile ulcer. Also has chronic wounds on foot. He was found to be hypotensive on field 60/30. He was given 3L bolus in ED after which BP was still low 70/30. Patient denies fever. As per AL records no h/o abdominal pain, nausea, vomiting, diarrhea, cough.     Patient appears comfortable. No new complaints reported, No abdominal pain, N/V, hematemesis, hematochezia, melena, fever, chills, chest pain, SOB, cough or diarrhea reported.      PAIN MANAGEMENT:  Pain Scale:                 /10  Pain Location:      Prior Colonoscopy:  Unknown    PAST MEDICAL HISTORY  HTN (hypertension)  HLD (hyperlipidemia)  DM (diabetes mellitus)  CAD (coronary artery disease)  Glaucoma, angle-closure  [place] (hard of hearing)        PAST SURGICAL HISTORY  CABG (coronary artery bypass graft)  Thyroid surgery        Allergies    No Known Allergies    Intolerances  None     SOCIAL HISTORY  Advanced Directives:       [ X ] Full Code       [  ] DNR  Marital Status:         [  ] M      [ X ] S      [  ] D       [  ] W  Children:       [ X ] Yes      [  ] No  Occupation:        [  ] Employed       [ X ] Unemployed       [  ] Retired  Diet:       [ X ] Regular       [  ] PEG feeding          [  ] NG tube feeding  Drug Use:           [ X ] Patient denied          [  ] Yes  Alcohol:           [ X ] No             [  ] Yes (socially)         [  ] Yes (chronic)  Tobacco:           [  ] Yes           [ X ] No      FAMILY HISTORY  [ X ] Heart Disease            [ X ] Diabetes             [ X ] HTN             [  ] Colon Cancer             [  ] Stomach Cancer              [  ] Pancreatic Cancer      VITALS  Vital Signs Last 24 Hrs  T(C): 36.7 (07 Feb 2022 12:23), Max: 36.7 (07 Feb 2022 12:23)  T(F): 98 (07 Feb 2022 12:23), Max: 98 (07 Feb 2022 12:23)  HR: 86 (07 Feb 2022 12:23) (86 - 101)  BP: 138/70 (07 Feb 2022 12:23) (109/46 - 155/72)   RR: 18 (07 Feb 2022 12:23) (17 - 18)  SpO2: 100% (07 Feb 2022 12:23) (99% - 100%)       MEDICATIONS  (STANDING):  apixaban 5 milliGRAM(s) Oral two times a day  aspirin  chewable 81 milliGRAM(s) Oral daily  atorvastatin 40 milliGRAM(s) Oral at bedtime  collagenase Ointment 1 Application(s) Topical daily  finasteride 5 milliGRAM(s) Oral daily  influenza  Vaccine (HIGH DOSE) 0.7 milliLiter(s) IntraMuscular once  insulin lispro (ADMELOG) corrective regimen sliding scale   SubCutaneous every 6 hours  metoprolol tartrate 12.5 milliGRAM(s) Oral every 12 hours  mirtazapine 7.5 milliGRAM(s) Oral daily  nystatin Ointment 1 Application(s) Topical two times a day  pantoprazole    Tablet 40 milliGRAM(s) Oral before breakfast  sodium chloride 0.9%. 1000 milliLiter(s) (75 mL/Hr) IV Continuous <Continuous>  tamsulosin 0.4 milliGRAM(s) Oral at bedtime    MEDICATIONS  (PRN):  acetaminophen     Tablet .. 650 milliGRAM(s) Oral every 6 hours PRN Temp greater or equal to 38C (100.4F), Mild Pain (1 - 3)  ondansetron Injectable 4 milliGRAM(s) IV Push every 8 hours PRN Nausea and/or Vomiting                            10.8   9.91  )-----------( 113      ( 07 Feb 2022 06:56 )             31.4       02-07    141  |  110<H>  |  31<H>  ----------------------------<  79  3.3<L>   |  19<L>  |  0.98    Ca    7.4<L>      07 Feb 2022 12:32    TPro  4.9<L>  /  Alb  1.7<L>  /  TBili  0.8  /  DBili  x   /  AST  24  /  ALT  11  /  AlkPhos  115  02-07

## 2022-02-07 NOTE — PROGRESS NOTE ADULT - ASSESSMENT
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 Patient received 3L bolus in ED, s/p BP was still low 70/30s. Patient admitted to ICU for septic shock requiring pressors.     #Septic shock 2/2 ulcers vs UTI  #Delirium  #Acute renal failure  #Penile ulcer  #Sacral ulcer  #Diabetic foot ulcer  #Elevated INR  #Elevated LFTs

## 2022-02-07 NOTE — PROGRESS NOTE ADULT - GASTROINTESTINAL DETAILS
soft/nontender/no distention/no masses palpable/bowel sounds normal/no rebound tenderness/no guarding/no rigidity
soft/nontender/no distention/bowel sounds normal/no guarding/no rigidity
soft/no distention/no masses palpable/bowel sounds normal/no rebound tenderness/no guarding/no rigidity
soft/nontender/no distention/bowel sounds normal/no guarding/no rigidity

## 2022-02-07 NOTE — PROGRESS NOTE ADULT - TONSILS
no redness/no swelling

## 2022-02-07 NOTE — PROGRESS NOTE ADULT - SUBJECTIVE AND OBJECTIVE BOX
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 Patient is alert and oriented on encounter. Patient states that he cut his foot and his back was hurting. Patient is unable to provide much history due to discomfort. Patient has stage 3 ulcers on sacrum, penile ulcer. Also has chronic wounds on foot. He was found to be hypotensive on field 60/30. He was given 3L bolus in ED after which BP was still low 70/30. Patient denies fever. As per AL records no h/o abdominal pain, nausea, vomiting, diarrhea, cough.  (17 Jan 2022 15:23)      ICU Vital Signs Last 24 Hrs  T(C): 36.7 (07 Feb 2022 12:23), Max: 36.7 (07 Feb 2022 12:23)  T(F): 98 (07 Feb 2022 12:23), Max: 98 (07 Feb 2022 12:23)  HR: 86 (07 Feb 2022 12:23) (86 - 101)  BP: 138/70 (07 Feb 2022 12:23) (109/46 - 155/72)  BP(mean): --  ABP: --  ABP(mean): --  RR: 18 (07 Feb 2022 12:23) (17 - 18)  SpO2: 100% (07 Feb 2022 12:23) (99% - 100%)      Vital Signs Last 24 Hrs  T(C): 36.7 (07 Feb 2022 12:23), Max: 36.7 (07 Feb 2022 12:23)  T(F): 98 (07 Feb 2022 12:23), Max: 98 (07 Feb 2022 12:23)  HR: 86 (07 Feb 2022 12:23) (86 - 101)  BP: 138/70 (07 Feb 2022 12:23) (109/46 - 155/72)  BP(mean): --  RR: 18 (07 Feb 2022 12:23) (17 - 18)  SpO2: 100% (07 Feb 2022 12:23) (99% - 100%)                        10.8   9.91  )-----------( 113      ( 07 Feb 2022 06:56 )             31.4     02-07    141  |  110<H>  |  31<H>  ----------------------------<  79  3.3<L>   |  19<L>  |  0.98    Ca    7.4<L>      07 Feb 2022 12:32    TPro  4.9<L>  /  Alb  1.7<L>  /  TBili  0.8  /  DBili  x   /  AST  24  /  ALT  11  /  AlkPhos  115  02-07        CAPILLARY BLOOD GLUCOSE      POCT Blood Glucose.: 89 mg/dL (07 Feb 2022 17:08)  POCT Blood Glucose.: 83 mg/dL (07 Feb 2022 11:20)  POCT Blood Glucose.: 80 mg/dL (07 Feb 2022 06:15)  POCT Blood Glucose.: 76 mg/dL (07 Feb 2022 00:15)  POCT Blood Glucose.: 83 mg/dL (06 Feb 2022 21:27)      MEDICATIONS  (STANDING):  apixaban 5 milliGRAM(s) Oral two times a day  aspirin  chewable 81 milliGRAM(s) Oral daily  atorvastatin 40 milliGRAM(s) Oral at bedtime  collagenase Ointment 1 Application(s) Topical daily  finasteride 5 milliGRAM(s) Oral daily  influenza  Vaccine (HIGH DOSE) 0.7 milliLiter(s) IntraMuscular once  insulin lispro (ADMELOG) corrective regimen sliding scale   SubCutaneous every 6 hours  metoprolol tartrate 12.5 milliGRAM(s) Oral every 12 hours  mirtazapine 7.5 milliGRAM(s) Oral daily  nystatin Ointment 1 Application(s) Topical two times a day  pantoprazole    Tablet 40 milliGRAM(s) Oral before breakfast  sodium chloride 0.9%. 1000 milliLiter(s) (75 mL/Hr) IV Continuous <Continuous>  tamsulosin 0.4 milliGRAM(s) Oral at bedtime    MEDICATIONS  (PRN):  acetaminophen     Tablet .. 650 milliGRAM(s) Oral every 6 hours PRN Temp greater or equal to 38C (100.4F), Mild Pain (1 - 3)  ondansetron Injectable 4 milliGRAM(s) IV Push every 8 hours PRN Nausea and/or Vomiting    Allergies    No Known Allergies    Intolerances      PAST MEDICAL & SURGICAL HISTORY:  HTN (hypertension)    HLD (hyperlipidemia)    DM (diabetes mellitus)    CAD (coronary artery disease)    Glaucoma, angle-closure    [place] (hard of hearing)    S/P CABG (coronary artery bypass graft)    History of thyroid surgery      Social History:     HPI:  75 yo male from [facility] assisted living with medical history of DM on insulin, HTN, HLD, CAD S/p CABG, 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 Patient is alert and oriented on encounter. Patient states that he cut his foot and his back was hurting. Patient is unable to provide much history due to discomfort. Patient has stage 3 ulcers on sacrum, penile ulcer. Also has chronic wounds on foot. He was found to be hypotensive on field 60/30. He was given 3L bolus in ED after which BP was still low 70/30. Patient denies fever. As per AL records no h/o abdominal pain, nausea, vomiting, diarrhea, cough.  (17 Jan 2022 15      Vital Signs Last 24 Hrs  T(C): 36.7 (07 Feb 2022 12:23), Max: 36.7 (07 Feb 2022 12:23)  T(F): 98 (07 Feb 2022 12:23), Max: 98 (07 Feb 2022 12:23)  HR: 86 (07 Feb 2022 12:23) (86 - 101)  BP: 138/70 (07 Feb 2022 12:23) (109/46 - 155/72)  BP(mean): --  RR: 18 (07 Feb 2022 12:23) (17 - 18)  SpO2: 100% (07 Feb 2022 12:23) (99% - 100%)                        10.8   9.91  )-----------( 113      ( 07 Feb 2022 06:56 )             31.4     02-07    141  |  110<H>  |  31<H>  ----------------------------<  79  3.3<L>   |  19<L>  |  0.98    Ca    7.4<L>      07 Feb 2022 12:32    TPro  4.9<L>  /  Alb  1.7<L>  /  TBili  0.8  /  DBili  x   /  AST  24  /  ALT  11  /  AlkPhos  115  02-07        CAPILLARY BLOOD GLUCOSE      POCT Blood Glucose.: 89 mg/dL (07 Feb 2022 17:08)  POCT Blood Glucose.: 83 mg/dL (07 Feb 2022 11:20)  POCT Blood Glucose.: 80 mg/dL (07 Feb 2022 06:15)  POCT Blood Glucose.: 76 mg/dL (07 Feb 2022 00:15)  POCT Blood Glucose.: 83 mg/dL (06 Feb 2022 21:27)      MEDICATIONS  (STANDING):  apixaban 5 milliGRAM(s) Oral two times a day  aspirin  chewable 81 milliGRAM(s) Oral daily  atorvastatin 40 milliGRAM(s) Oral at bedtime  collagenase Ointment 1 Application(s) Topical daily  finasteride 5 milliGRAM(s) Oral daily  influenza  Vaccine (HIGH DOSE) 0.7 milliLiter(s) IntraMuscular once  insulin lispro (ADMELOG) corrective regimen sliding scale   SubCutaneous every 6 hours  metoprolol tartrate 12.5 milliGRAM(s) Oral every 12 hours  mirtazapine 7.5 milliGRAM(s) Oral daily  nystatin Ointment 1 Application(s) Topical two times a day  pantoprazole    Tablet 40 milliGRAM(s) Oral before breakfast  sodium chloride 0.9%. 1000 milliLiter(s) (75 mL/Hr) IV Continuous <Continuous>  tamsulosin 0.4 milliGRAM(s) Oral at bedtime    MEDICATIONS  (PRN):  acetaminophen     Tablet .. 650 milliGRAM(s) Oral every 6 hours PRN Temp greater or equal to 38C (100.4F), Mild Pain (1 - 3)  ondansetron Injectable 4 milliGRAM(s) IV Push every 8 hours PRN Nausea and/or Vomiting    Allergies    No Known Allergies    Intolerances      PAST MEDICAL & SURGICAL HISTORY:  HTN (hypertension)    HLD (hyperlipidemia)    DM (diabetes mellitus)    CAD (coronary artery disease)    Glaucoma, angle-closure    [place] (hard of hearing)    S/P CABG (coronary artery bypass graft)    History of thyroid surgery      Social History:    pt seen at bedside   lethargic   vasc:   DP -fainted   TG-WNL  CRF: 2 sec   neuro:   N/A  Derm:   multiple lesions mostly right foot   covered with dry dark skin   no drainage   b/l heel   DTI  right hell is open skin lesion   no drainage , no odor   NO PEDAL hair growth     skin is shiny   dry   discolorations ++  MS;  N/A

## 2022-02-07 NOTE — PROGRESS NOTE ADULT - NOSE
no discharge/no deviation

## 2022-02-07 NOTE — PROGRESS NOTE ADULT - ASSESSMENT
a/p  DM with circulatory complications   multiple skin lesions   diabetic ulcers /DTI  pt seen and eval   DSD   continue   offloading   xary -f/u   antibiotics as per id   santyl to right heel +DSD   will re-eval in a few days    please call with any pt related questions [phone]

## 2022-02-07 NOTE — PROGRESS NOTE ADULT - MS EXT PE MLT D E PC
no clubbing/no cyanosis
no cyanosis/pedal edema
no cyanosis/no pedal edema
no clubbing/no cyanosis

## 2022-02-07 NOTE — PROGRESS NOTE ADULT - RS GEN PE MLT RESP DETAILS PC
airway patent/breath sounds equal/good air movement/respirations non-labored/clear to auscultation bilaterally/no rales/no rhonchi
airway patent/breath sounds equal/good air movement/respirations non-labored/no rales
airway patent/breath sounds equal/good air movement/respirations non-labored/clear to auscultation bilaterally/no rales
breath sounds equal/good air movement/clear to auscultation bilaterally/no rales/no rhonchi/no wheezes
airway patent/breath sounds equal/good air movement/respirations non-labored/clear to auscultation bilaterally/no rales/no rhonchi
breath sounds equal/good air movement/clear to auscultation bilaterally/no rales/no rhonchi/no wheezes
airway patent/breath sounds equal/good air movement/respirations non-labored/no rhonchi/no wheezes

## 2022-02-07 NOTE — PROGRESS NOTE ADULT - PROBLEM SELECTOR PLAN 1
around 4.2, improving  Patient has normal creatinine at baseline, 1.18 Nov 2021  Presented with elevated BUN/cr of 51/4.45 improved back to baseline  Most likely ATN from hypoperfusion   Will monitor UO and BMP  Nephro on board, Dr. Sharon Morel care on board, patient eligible for hospice but patient remains full code for now, daughter wants to talk to pt's son  family opted for PEG tube  GI consulted Dr Viera for PEG tube eval, s/p attempt patient with partial outlet obstruction  CT A/P to r/o obstruction with PO contrast, negative      Daughter and son continue to express patient code status as full code Patient has normal creatinine at baseline, 1.18 Nov 2021  Presented with elevated BUN/cr of 51/4.45 improved back to baseline  Most likely ATN from hypoperfusion   Will monitor UO and BMP  Nephro on board, Dr. Sharon Morel care on board, patient eligible for hospice but patient remains full code for now, daughter wants to talk to pt's son  GI consulted Dr Viera  PEG placed today  CT A/P to r/o obstruction with PO contrast was negative    Daughter and son continue to express patient code status as full code

## 2022-02-07 NOTE — PROGRESS NOTE ADULT - ASSESSMENT
1. Dysphagia  2. Weight loss  3. Failure to thrive  4. R/o cholecystitis    Suggestions:    1. Abdominal sonogram  2. Monitor electrolytes  3. IVF hydration  4. Protonix daily  5. Avoid NSAID  6. Peg placement postponed to tomorrow  7. DVT prophylaxis

## 2022-02-07 NOTE — PROGRESS NOTE ADULT - [facility] PE MLT D E PC
regular rate and rhythm
regular rate and rhythm/no rub
regular rate and rhythm
regular rate and rhythm/no rub
regular rate and rhythm

## 2022-02-07 NOTE — PROGRESS NOTE ADULT - PHARYNX
no redness/no discharge

## 2022-02-07 NOTE — PROGRESS NOTE ADULT - COMMENTS
PATIENT IS UNABLE TO PROVIDE

## 2022-02-08 LAB
ALBUMIN SERPL ELPH-MCNC: 1.6 G/DL — LOW (ref 3.5–5)
ALP SERPL-CCNC: 117 U/L — SIGNIFICANT CHANGE UP (ref 40–120)
ALT FLD-CCNC: 12 U/L DA — SIGNIFICANT CHANGE UP (ref 10–60)
ANION GAP SERPL CALC-SCNC: 12 MMOL/L — SIGNIFICANT CHANGE UP (ref 5–17)
AST SERPL-CCNC: 23 U/L — SIGNIFICANT CHANGE UP (ref 10–40)
BILIRUB SERPL-MCNC: 0.7 MG/DL — SIGNIFICANT CHANGE UP (ref 0.2–1.2)
BUN SERPL-MCNC: 32 MG/DL — HIGH (ref 7–18)
CALCIUM SERPL-MCNC: 7.9 MG/DL — LOW (ref 8.4–10.5)
CHLORIDE SERPL-SCNC: 110 MMOL/L — HIGH (ref 96–108)
CO2 SERPL-SCNC: 21 MMOL/L — LOW (ref 22–31)
CREAT SERPL-MCNC: 0.96 MG/DL — SIGNIFICANT CHANGE UP (ref 0.5–1.3)
GLUCOSE BLDC GLUCOMTR-MCNC: 100 MG/DL — HIGH (ref 70–99)
GLUCOSE BLDC GLUCOMTR-MCNC: 77 MG/DL — SIGNIFICANT CHANGE UP (ref 70–99)
GLUCOSE BLDC GLUCOMTR-MCNC: 85 MG/DL — SIGNIFICANT CHANGE UP (ref 70–99)
GLUCOSE BLDC GLUCOMTR-MCNC: 92 MG/DL — SIGNIFICANT CHANGE UP (ref 70–99)
GLUCOSE SERPL-MCNC: 84 MG/DL — SIGNIFICANT CHANGE UP (ref 70–99)
HCT VFR BLD CALC: 29.9 % — LOW (ref 39–50)
HGB BLD-MCNC: 9.8 G/DL — LOW (ref 13–17)
MAGNESIUM SERPL-MCNC: 1.2 MG/DL — LOW (ref 1.6–2.6)
MCHC RBC-ENTMCNC: 28.6 PG — SIGNIFICANT CHANGE UP (ref 27–34)
MCHC RBC-ENTMCNC: 32.8 GM/DL — SIGNIFICANT CHANGE UP (ref 32–36)
MCV RBC AUTO: 87.2 FL — SIGNIFICANT CHANGE UP (ref 80–100)
NRBC # BLD: 0 /100 WBCS — SIGNIFICANT CHANGE UP (ref 0–0)
PHOSPHATE SERPL-MCNC: 3.1 MG/DL — SIGNIFICANT CHANGE UP (ref 2.5–4.5)
PLATELET # BLD AUTO: 135 K/UL — LOW (ref 150–400)
POTASSIUM SERPL-MCNC: 3.3 MMOL/L — LOW (ref 3.5–5.3)
POTASSIUM SERPL-SCNC: 3.3 MMOL/L — LOW (ref 3.5–5.3)
PROT SERPL-MCNC: 4.8 G/DL — LOW (ref 6–8.3)
RBC # BLD: 3.43 M/UL — LOW (ref 4.2–5.8)
RBC # FLD: 15.9 % — HIGH (ref 10.3–14.5)
SARS-COV-2 RNA SPEC QL NAA+PROBE: SIGNIFICANT CHANGE UP
SODIUM SERPL-SCNC: 143 MMOL/L — SIGNIFICANT CHANGE UP (ref 135–145)
WBC # BLD: 8.62 K/UL — SIGNIFICANT CHANGE UP (ref 3.8–10.5)
WBC # FLD AUTO: 8.62 K/UL — SIGNIFICANT CHANGE UP (ref 3.8–10.5)

## 2022-02-08 DEVICE — CATH ESOPH DIL 9 ATM 6FR 8-10MM: Type: IMPLANTABLE DEVICE | Status: FUNCTIONAL

## 2022-02-08 DEVICE — PEG KT SAFETY 20FR: Type: IMPLANTABLE DEVICE | Status: FUNCTIONAL

## 2022-02-08 RX ORDER — MAGNESIUM SULFATE 500 MG/ML
2 VIAL (ML) INJECTION ONCE
Refills: 0 | Status: COMPLETED | OUTPATIENT
Start: 2022-02-08 | End: 2022-02-08

## 2022-02-08 RX ORDER — COLLAGENASE CLOSTRIDIUM HIST. 250 UNIT/G
1 OINTMENT (GRAM) TOPICAL DAILY
Refills: 0 | Status: DISCONTINUED | OUTPATIENT
Start: 2022-02-08 | End: 2022-02-10

## 2022-02-08 RX ORDER — ACETAMINOPHEN 500 MG
1000 TABLET ORAL ONCE
Refills: 0 | Status: COMPLETED | OUTPATIENT
Start: 2022-02-08 | End: 2022-02-08

## 2022-02-08 RX ADMIN — Medication 1 APPLICATION(S): at 10:59

## 2022-02-08 RX ADMIN — Medication 400 MILLIGRAM(S): at 16:53

## 2022-02-08 RX ADMIN — Medication 25 GRAM(S): at 13:46

## 2022-02-08 RX ADMIN — ATORVASTATIN CALCIUM 40 MILLIGRAM(S): 80 TABLET, FILM COATED ORAL at 21:30

## 2022-02-08 RX ADMIN — Medication 1000 MILLIGRAM(S): at 17:18

## 2022-02-08 RX ADMIN — NYSTATIN CREAM 1 APPLICATION(S): 100000 CREAM TOPICAL at 17:18

## 2022-02-08 RX ADMIN — Medication 12.5 MILLIGRAM(S): at 17:18

## 2022-02-08 RX ADMIN — TAMSULOSIN HYDROCHLORIDE 0.4 MILLIGRAM(S): 0.4 CAPSULE ORAL at 21:30

## 2022-02-08 NOTE — PROGRESS NOTE ADULT - SUBJECTIVE AND OBJECTIVE BOX
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 Patient is alert and oriented on encounter. Patient states that he cut his foot and his back was hurting. Patient is unable to provide much history due to discomfort. Patient has stage 3 ulcers on sacrum, penile ulcer. Also has chronic wounds on foot. He was found to be hypotensive on field 60/30. He was given 3L bolus in ED after which BP was still low 70/30. Patient denies fever. As per AL records no h/o abdominal pain, nausea, vomiting, diarrhea, cough.  (17 Jan 2022 15:23)      INTERIM EVENTS:Patient seen at bedside interim events noted.      PMH -reviewed admission note, no change since admission  Heart Failure: Acute [ ] Chronic [ ] Acute on Chronic [ ] Diastolic [ ] Systolic [ ] Combined Systolic and Diastolic[ ]  OREN[ ]  ATN[ ]  CKD I [ ] CKDII [ ] CKD III [ ] CKD IV [ ] CKD V [ ] ESRD[ ]  HTN[ ] CVA[ ] DM[ ] COPD[ ] COVID[ ] AF[ ]  PPM[ ] ICD[ ]    MEDICATIONS  (STANDING):  apixaban 5 milliGRAM(s) Oral two times a day  aspirin  chewable 81 milliGRAM(s) Oral daily  atorvastatin 40 milliGRAM(s) Oral at bedtime  collagenase Ointment 1 Application(s) Topical daily  collagenase Ointment 1 Application(s) Topical daily  finasteride 5 milliGRAM(s) Oral daily  influenza  Vaccine (HIGH DOSE) 0.7 milliLiter(s) IntraMuscular once  insulin lispro (ADMELOG) corrective regimen sliding scale   SubCutaneous every 6 hours  metoprolol tartrate 12.5 milliGRAM(s) Oral every 12 hours  mirtazapine 7.5 milliGRAM(s) Oral daily  nystatin Ointment 1 Application(s) Topical two times a day  pantoprazole    Tablet 40 milliGRAM(s) Oral before breakfast  sodium chloride 0.9%. 1000 milliLiter(s) (75 mL/Hr) IV Continuous <Continuous>  tamsulosin 0.4 milliGRAM(s) Oral at bedtime    MEDICATIONS  (PRN):  acetaminophen     Tablet .. 650 milliGRAM(s) Oral every 6 hours PRN Temp greater or equal to 38C (100.4F), Mild Pain (1 - 3)  ondansetron Injectable 4 milliGRAM(s) IV Push every 8 hours PRN Nausea and/or Vomiting            REVIEW OF SYSTEMS: unable to elicit , pt confused    Vital Signs Last 24 Hrs  T(C): 36.3 (08 Feb 2022 19:35), Max: 37.1 (08 Feb 2022 05:16)  T(F): 97.4 (08 Feb 2022 19:35), Max: 98.7 (08 Feb 2022 05:16)  HR: 88 (08 Feb 2022 19:35) (54 - 100)  BP: 141/67 (08 Feb 2022 19:35) (113/64 - 141/67)  BP(mean): --  RR: 16 (08 Feb 2022 19:35) (16 - 18)  SpO2: 99% (08 Feb 2022 19:35) (97% - 100%)  I&O's Summary    07 Feb 2022 07:01  -  08 Feb 2022 07:00  --------------------------------------------------------  IN: 0 mL / OUT: 300 mL / NET: -300 mL    08 Feb 2022 07:01  -  08 Feb 2022 20:00  --------------------------------------------------------  IN: 0 mL / OUT: 225 mL / NET: -225 mL        PHYSICAL EXAM:  General: No acute distress BMI-  HEENT: EOMI, PERRL  Neck: Supple, [ ] JVD  Lungs: Equal air entry bilaterally;   Heart: Regular rate and rhythm;  Abdomen: Nontender, bowel sounds present  Extremities: no edema  Nervous system:  Alert & awake,  Psychiatric: Normal affect  Skin: No rashes or lesions    LABS:  02-08    143  |  110<H>  |  32<H>  ----------------------------<  84  3.3<L>   |  21<L>  |  0.96    Ca    7.9<L>      08 Feb 2022 08:08  Phos  3.1     02-08  Mg     1.2     02-08    TPro  4.8<L>  /  Alb  1.6<L>  /  TBili  0.7  /  DBili  x   /  AST  23  /  ALT  12  /  AlkPhos  117  02-08    Creatinine Trend: 0.96<--, 0.98<--, 1.01<--, 0.94<--, 0.97<--, 1.06<--                        9.8    8.62  )-----------( 135      ( 08 Feb 2022 08:08 )             29.9         Cardiac Enzymes:

## 2022-02-08 NOTE — PROGRESS NOTE ADULT - PROBLEM SELECTOR PLAN 1
Patient has normal creatinine at baseline, 1.18 Nov 2021  Presented with elevated BUN/cr of 51/4.45 improved back to baseline  Most likely ATN from hypoperfusion   Will monitor UO and BMP  Nephro on board, Dr. Herrera

## 2022-02-08 NOTE — PROGRESS NOTE ADULT - ASSESSMENT
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 Patient received 3L bolus in ED, s/p BP was still low 70/30s. Patient admitted to ICU for septic shock requiring pressors.         #Septic shock 2/2 ulcers vs UTI  #Delirium  #Acute renal failure  #Penile ulcer  #Sacral ulcer  #Diabetic foot ulcer  #Elevated INR  #Elevated LFTs

## 2022-02-08 NOTE — DISCHARGE NOTE PROVIDER - NSDCCPCAREPLAN_GEN_ALL_CORE_FT
PRINCIPAL DISCHARGE DIAGNOSIS  Diagnosis: Sepsis  Assessment and Plan of Treatment: You presented to the hospital with lethargy and hypotension. You were admitted to the ICU for septic shock requiring medication to increase your blood pressure. You recieved fluids, and medication to increase your blood pressure. You were given antibiotics. Your mental status improved. Your WBC, marker for infection, trended down. Your blood pressure improved and you no longer required blood pressure medication.      SECONDARY DISCHARGE DIAGNOSES  Diagnosis: Diabetes mellitus  Assessment and Plan of Treatment: You have a history of diabetes, a disease characterized by high blood glucose. You were started on sliding scale insulin and RAMERON, for appetitie. Please continue your home medications for your diabetes, and RAMERON as directed. See your pcp within one week of discharge for your diabetes.      Diagnosis: OREN (acute kidney injury)  Assessment and Plan of Treatment: You presented with acute kidney injury, a temporary decreased functioning of your kidneys. You were given IV fluids and your kidney function improved.    Diagnosis: Infectious encephalopathy  Assessment and Plan of Treatment: You presented with altered mental status. Your CT head showed moderate ischemia in the part of your brain next to the ventricles as well as atrophy. 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 Your mental status slowly improved with treament with antibiotics, fluids, and pressors.       Diagnosis: HTN (hypertension)  Assessment and Plan of Treatment: You have a history of hypertension, a disease characterized by high blood pressure. You were not started on your blood pressure medications. Please see your pcp within one week of discharge for your hypertension.     PRINCIPAL DISCHARGE DIAGNOSIS  Diagnosis: Sepsis  Assessment and Plan of Treatment: You presented to the hospital with lethargy and hypotension. You were admitted to the ICU for septic shock requiring medication to increase your blood pressure. You recieved fluids, and medication to increase your blood pressure. You were given antibiotics. Your mental status improved. Your WBC, marker for infection, trended down. Your blood pressure improved and you no longer required blood pressure medication.      SECONDARY DISCHARGE DIAGNOSES  Diagnosis: Adult failure to thrive  Assessment and Plan of Treatment: You were unable to tolerate an oral diet. Your family decided to keep you as full code, and opted for peg tube placement. Your peg tube was placed by GI, and you were started on tube feed diet: Glucerna. Please continue with peg tube feedings.    Diagnosis: Diabetes mellitus  Assessment and Plan of Treatment: You have a history of diabetes, a disease characterized by high blood glucose. You were started on sliding scale insulin and RAMERON, for appetitie. Please continue your home medications for your diabetes, and RAMERON as directed. See your pcp within one week of discharge for your diabetes.      Diagnosis: OREN (acute kidney injury)  Assessment and Plan of Treatment: You presented with acute kidney injury, a temporary decreased functioning of your kidneys. You were given IV fluids and your kidney function improved.    Diagnosis: Infectious encephalopathy  Assessment and Plan of Treatment: You presented with altered mental status. Your CT head showed moderate ischemia in the part of your brain next to the ventricles as well as atrophy. 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 Your mental status slowly improved with treament with antibiotics, fluids, and pressors.       Diagnosis: HTN (hypertension)  Assessment and Plan of Treatment: You have a history of hypertension, a disease characterized by high blood pressure. You were not started on your blood pressure medications. Please see your pcp within one week of discharge for your hypertension.

## 2022-02-08 NOTE — DISCHARGE NOTE PROVIDER - CARE PROVIDER_API CALL
Torsten Barrett)  Cardiology  [address] [place] [address]  Phone: [phone]  Fax: [phone]  Established Patient  Follow Up Time: 1 week

## 2022-02-08 NOTE — DISCHARGE NOTE PROVIDER - INSTRUCTIONS
Glucerna 1.5 cass    Glucerna 1.5 cass   continuous starting tube feed rate: 10  Increase tube feed rate by 10ml every 4 hours  Unit goal tube feed rate: 40 ml per hour  Tube feed duration in hours: 24  Free water flush: Bolus total volume per flush (ml) 250 q4h

## 2022-02-08 NOTE — DISCHARGE NOTE PROVIDER - HOSPITAL COURSE
75 yo male from 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 Admitted to ICU for septic shock requiring pressors.     OREN: Patient has normal creatinine at baseline, 1.18 Nov 2021. Presented with elevated BUN/cr of 51/4.45 improving. Most likely ATN from hypoperfusion. S/p 6L bolus, No more fluids for now. Will monitor UO and BMP. Nephro on board, Dr. Herrera.    Pall care on board, patient eligible for hospice but patient remains full code for now, daughter wants to talk to pt's son  family considering PEG tube  GI consulted Dr Viera for PEG tube eval, s/p attempt patient with partial outlet obstruction  CT A/P to r/o obstruction with PO contrast, negative  possible PEG Monday by Dr Veira    Daughter and son continue to express patient code status as full code.     Problem/Plan - 2:  ·  Problem: Sepsis.   ·  Plan: Septic shock 2/2 ulcers vs UTI  Patient has septic shock 2/2 SSTI  Completed Zosyn, Off Linezolid since 1/21/22  C/w Diflucan 100 mg qd for 5 days, completed  UA +ve and Ucx yeast like   ID Dr Randhawa on board  For PEG tube  palliative care consulted.     Problem/Plan - 3:  ·  Problem: Diabetic foot ulcer.   ·  Plan: Patient has stage 3/4 sacral ulcer, penile ulcer, foot ulcer  Wound care on board  Completed Zosyn, Off Linezolid since 1/21/22  C/w Diflucan 100 mg qd for 5 days   Dr Garcia on board   ID Dr Randhawa on board   on board.     Problem/Plan - 4:  ·  Problem: Diabetes mellitus.   ·  Plan: Patient has h/o IDDM, on 10 units of Lantus at bedtime  Diet Puree renal diet   Monitor ACHS, FS before meals and at bedtime   HSS  C/w Remeron for appetite.     Problem/Plan - 5:  ·  Problem: Preventive measure.   ·  Plan: eliquis dvt ppx  PPI gi ppx.    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 Admitted to ICU for septic shock requiring pressors.     ICU course:   77 yo male from [facility] [place] with past medical history of BPH- chronic Molina, DM on insulin, CKD, HTN, PAF, HLD, CAD S/p CABG, right partial 5th ray amputation 8/19/2021, would ulcers (Last cx grew Enterococcus, Aeromonas and Klebsiella) comes in with lethargy and hypotension. Patient received 3L bolus in ED, s/p BP was still low 70/30s. Patient was admitted to ICU for septic shock 2/2 UTI requiring pressors.   For septic shock 2/2 UTI (yeast like), patient received 6L of IVF, he was on Levophed, vasopressin, tapered off now on midodrine 5 mg q8 hrs, stress dose of Solu cortef for 3 days, IV albumin x 4 doses, completed Zosyn and is on Diflucan 100 mg qd for 5 days to be completed tomorrow 1/25/22.    Patient had acute infectious encephalopathy, CTH showed moderate periventricular and deep white matter ischemia. Encephalomalacia and gliosis in right occipital lobe. Global atrophy. 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 Patient's mental status improved to baseline AAx2-3.  For PAF, patient is on Eliquis 5 mg BID at home, was continued on 2.5 mg BID as patient presented with OREN.   For ORNE on CKD, was most likely from ATN, s/p IVF, creatinine improving, no need for HD. Nephro Dr Herrera on board.   For DM, patient is on Lantus at home, now on HSS only and on Remeron to help with appetite.   For wounds (penile, scrotum, LE and sacral area) patient was seen by wound care, recommendations and Podiatry on board.   For hypertension, on lisinopril, nifedipine, holding for now as patient was just weaned off pressors and is on Midodrine.    For CAD, continued on lowered dose metoprolol 12.5 mg BID, statin and ASA.   For mild transaminitis, most likely secondary to septic shock now resolved.  For BPH on Flomax and Proscar, resumed.    Hospital course:  OREN: Patient has normal creatinine at baseline, 1.18 Nov 2021. Presented with elevated BUN/cr of 51/4.45 which resolved with treatment w/IVF. Most likely ATN from hypoperfusion. S/p 6L bolus. Nephro on board, Dr. Herrera.  Septic shock 2/2 ulcers vs UTI. Patient has septic shock 2/2 SSTI. Completed Zosyn, Off Linezolid since 1/21/22. C/w Diflucan 100 mg qd for 5 days, completed. UA was positive and Ucx showed yeast. ID Dr Randhawa on board.   Diabetic ulcer: Patient has stage 3/4 sacral ulcer, penile ulcer, and foot ulcer. Wound care was consulted. Pt completed Zosyn, Off Linezolid since 1/21/22. Pt was treated with Diflucan 100 mg qd for 5 days. Dr Garcia consulted. CT A/P to r/o obstruction with PO contrast, negative. Peg was placed on Tuesday 2/8 by Dr Viera. Pt was monitored for 24 hours post peg placement.  Diabetes mellitus. Patient has h/o IDDM, on 10 units of Lantus at bedtime. Diet Puree renal diet. Monitor ACHS, FS before meals and at bedtime. Pt was placed on sliding scale insulin. And given Remeron for appetite.  Prophylaxis: eliquis dvt ppx and PPI gi ppx.  Palliative was consulted for goals of care and Daughter and son continue to express patient code status as full code.       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 Admitted to ICU for septic shock requiring pressors.     ICU course:   77 yo male from [facility] [place] with past medical history of BPH- chronic Molina, DM on insulin, CKD, HTN, PAF, HLD, CAD S/p CABG, right partial 5th ray amputation 8/19/2021, would ulcers (Last cx grew Enterococcus, Aeromonas and Klebsiella) comes in with lethargy and hypotension. Patient received 3L bolus in ED, s/p BP was still low 70/30s. Patient was admitted to ICU for septic shock 2/2 UTI requiring pressors.   For septic shock 2/2 UTI (yeast like), patient received 6L of IVF, he was on Levophed, vasopressin, tapered off now on midodrine 5 mg q8 hrs, stress dose of Solu cortef for 3 days, IV albumin x 4 doses, completed Zosyn and is on Diflucan 100 mg qd for 5 days to be completed tomorrow 1/25/22.    Patient had acute infectious encephalopathy, CTH showed moderate periventricular and deep white matter ischemia. Encephalomalacia and gliosis in right occipital lobe. Global atrophy. 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 Patient's mental status improved to baseline AAx2-3.  For PAF, patient is on Eliquis 5 mg BID at home, was continued on 2.5 mg BID as patient presented with OREN.   For OREN on CKD, was most likely from ATN, s/p IVF, creatinine improving, no need for HD. Nephro Dr Herrera on board.   For DM, patient is on Lantus at home, now on HSS only and on Remeron to help with appetite.   For wounds (penile, scrotum, LE and sacral area) patient was seen by wound care, recommendations and Podiatry on board.   For hypertension, on lisinopril, nifedipine, holding for now as patient was just weaned off pressors and is on Midodrine.    For CAD, continued on lowered dose metoprolol 12.5 mg BID, statin and ASA.   For mild transaminitis, most likely secondary to septic shock now resolved.  For BPH on Flomax and Proscar, resumed.    Hospital course:  OREN: Patient has normal creatinine at baseline, 1.18 Nov 2021. Presented with elevated BUN/cr of 51/4.45 which resolved with treatment w/IVF. Most likely ATN from hypoperfusion. S/p 6L bolus. Nephro on board, Dr. Herrera.  Septic shock 2/2 ulcers vs UTI. Patient has septic shock 2/2 SSTI. Completed Zosyn, Off Linezolid since 1/21/22. C/w Diflucan 100 mg qd for 5 days, completed. UA was positive and Ucx showed yeast. ID Dr Randhawa on board.   Diabetic ulcer: Patient has stage 3/4 sacral ulcer, penile ulcer, and foot ulcer. Wound care was consulted. Pt completed Zosyn, Off Linezolid since 1/21/22. Pt was treated with Diflucan 100 mg qd for 5 days. Dr Garcia consulted. CT A/P to r/o obstruction with PO contrast, negative. Peg was placed on Tuesday 2/8 by Dr Viera. Pt was monitored for 24 hours post peg placement. Pt was started on tube feedings.  Diabetes mellitus. Patient has h/o IDDM, on 10 units of Lantus at bedtime. Diet Puree renal diet. Monitor ACHS, FS before meals and at bedtime. Pt was placed on sliding scale insulin. And given Remeron for appetite.  Prophylaxis: eliquis dvt ppx and PPI gi ppx.  Palliative was consulted for goals of care and Daughter and son continue to express patient code status as full code.

## 2022-02-08 NOTE — DISCHARGE NOTE PROVIDER - NSDCMRMEDTOKEN_GEN_ALL_CORE_FT
apixaban 5 mg oral tablet: 1 tab(s) orally every 12 hours  aspirin 81 mg oral tablet, chewable: 1 tab(s) orally once a day  bisacodyl 10 mg rectal suppository: 1 suppository(ies) rectal once  collagenase 250 units/g topical ointment: 1 application topically once a day  cyanocobalamin 1000 mcg oral tablet: 1 tab(s) orally once a day  Drisdol 1.25 mg (50,000 intl units) oral capsule: 1 cap(s) orally once a week   ferrous sulfate 325 mg (65 mg elemental iron) oral tablet: 1 tab(s) orally once a day  finasteride 5 mg oral tablet: 1 tab(s) orally once a day  fluconazole: 100 milligram(s) intravenous once a day  gabapentin 100 mg oral capsule: 1 cap(s) orally 3 times a day  Lantus Solostar Pen 100 units/mL subcutaneous solution: 10 unit(s) subcutaneous once a day (at bedtime)   lisinopril 10 mg oral tablet: 1 tab(s) orally once a day  metoprolol succinate 100 mg oral tablet, extended release: 1 tab(s) orally once a day  NIFEdipine 60 mg oral tablet, extended release: 1 tab(s) orally once a day  pantoprazole 40 mg oral delayed release tablet: 1 tab(s) orally once a day (before a meal)  polyethylene glycol 3350 oral powder for reconstitution: 17 gram(s) orally once a day  rosuvastatin 40 mg oral tablet: 1 tab(s) orally once a day  senna oral tablet: 2 tab(s) orally once a day (at bedtime)  tamsulosin 0.4 mg oral capsule: 1 cap(s) orally once a day (at bedtime)   apixaban 5 mg oral tablet: 1 tab(s) orally 2 times a day  aspirin 81 mg oral tablet, chewable: 1 tab(s) orally once a day  bisacodyl 10 mg rectal suppository: 1 suppository(ies) rectal once  collagenase 250 units/g topical ointment: 1 application topically once a day  collagenase 250 units/g topical ointment: 1 application topically once a day  ferrous sulfate 325 mg (65 mg elemental iron) oral tablet: 1 tab(s) orally once a day  finasteride 5 mg oral tablet: 1 tab(s) orally once a day  gabapentin 100 mg oral capsule: 1 cap(s) orally 3 times a day  Lantus Solostar Pen 100 units/mL subcutaneous solution: 10 unit(s) subcutaneous once a day (at bedtime)   mirtazapine 7.5 mg oral tablet: 1 tab(s) orally once a day  Multiple Vitamins with Minerals oral liquid: 1  by gastrostomy tube once a day  nystatin 100,000 units/g topical ointment: 1 application topically 2 times a day  pantoprazole 40 mg oral delayed release tablet: 1 tab(s) orally once a day (before a meal)  polyethylene glycol 3350 oral powder for reconstitution: 17 gram(s) orally once a day  rosuvastatin 40 mg oral tablet: 1 tab(s) orally once a day  senna oral tablet: 2 tab(s) orally once a day (at bedtime)  tamsulosin 0.4 mg oral capsule: 1 cap(s) orally once a day (at bedtime)

## 2022-02-09 LAB
ALBUMIN SERPL ELPH-MCNC: 1.6 G/DL — LOW (ref 3.5–5)
ALP SERPL-CCNC: 112 U/L — SIGNIFICANT CHANGE UP (ref 40–120)
ALT FLD-CCNC: 8 U/L DA — LOW (ref 10–60)
ANION GAP SERPL CALC-SCNC: 15 MMOL/L — SIGNIFICANT CHANGE UP (ref 5–17)
AST SERPL-CCNC: 21 U/L — SIGNIFICANT CHANGE UP (ref 10–40)
BILIRUB SERPL-MCNC: 0.8 MG/DL — SIGNIFICANT CHANGE UP (ref 0.2–1.2)
BUN SERPL-MCNC: 38 MG/DL — HIGH (ref 7–18)
CALCIUM SERPL-MCNC: 8.1 MG/DL — LOW (ref 8.4–10.5)
CHLORIDE SERPL-SCNC: 112 MMOL/L — HIGH (ref 96–108)
CO2 SERPL-SCNC: 18 MMOL/L — LOW (ref 22–31)
CREAT SERPL-MCNC: 1.17 MG/DL — SIGNIFICANT CHANGE UP (ref 0.5–1.3)
GLUCOSE BLDC GLUCOMTR-MCNC: 114 MG/DL — HIGH (ref 70–99)
GLUCOSE BLDC GLUCOMTR-MCNC: 115 MG/DL — HIGH (ref 70–99)
GLUCOSE BLDC GLUCOMTR-MCNC: 116 MG/DL — HIGH (ref 70–99)
GLUCOSE BLDC GLUCOMTR-MCNC: 127 MG/DL — HIGH (ref 70–99)
GLUCOSE BLDC GLUCOMTR-MCNC: 98 MG/DL — SIGNIFICANT CHANGE UP (ref 70–99)
GLUCOSE SERPL-MCNC: 115 MG/DL — HIGH (ref 70–99)
HCT VFR BLD CALC: 28.6 % — LOW (ref 39–50)
HGB BLD-MCNC: 9.5 G/DL — LOW (ref 13–17)
MCHC RBC-ENTMCNC: 28.8 PG — SIGNIFICANT CHANGE UP (ref 27–34)
MCHC RBC-ENTMCNC: 33.2 GM/DL — SIGNIFICANT CHANGE UP (ref 32–36)
MCV RBC AUTO: 86.7 FL — SIGNIFICANT CHANGE UP (ref 80–100)
NRBC # BLD: 0 /100 WBCS — SIGNIFICANT CHANGE UP (ref 0–0)
PLATELET # BLD AUTO: 168 K/UL — SIGNIFICANT CHANGE UP (ref 150–400)
POTASSIUM SERPL-MCNC: 3.2 MMOL/L — LOW (ref 3.5–5.3)
POTASSIUM SERPL-SCNC: 3.2 MMOL/L — LOW (ref 3.5–5.3)
PROT SERPL-MCNC: 4.7 G/DL — LOW (ref 6–8.3)
RBC # BLD: 3.3 M/UL — LOW (ref 4.2–5.8)
RBC # FLD: 16 % — HIGH (ref 10.3–14.5)
SODIUM SERPL-SCNC: 145 MMOL/L — SIGNIFICANT CHANGE UP (ref 135–145)
WBC # BLD: 7.07 K/UL — SIGNIFICANT CHANGE UP (ref 3.8–10.5)
WBC # FLD AUTO: 7.07 K/UL — SIGNIFICANT CHANGE UP (ref 3.8–10.5)

## 2022-02-09 RX ORDER — POTASSIUM CHLORIDE 20 MEQ
40 PACKET (EA) ORAL ONCE
Refills: 0 | Status: COMPLETED | OUTPATIENT
Start: 2022-02-09 | End: 2022-02-09

## 2022-02-09 RX ORDER — POTASSIUM CHLORIDE 20 MEQ
10 PACKET (EA) ORAL
Refills: 0 | Status: DISCONTINUED | OUTPATIENT
Start: 2022-02-09 | End: 2022-02-09

## 2022-02-09 RX ADMIN — Medication 650 MILLIGRAM(S): at 06:18

## 2022-02-09 RX ADMIN — PANTOPRAZOLE SODIUM 40 MILLIGRAM(S): 20 TABLET, DELAYED RELEASE ORAL at 06:07

## 2022-02-09 RX ADMIN — MIRTAZAPINE 7.5 MILLIGRAM(S): 45 TABLET, ORALLY DISINTEGRATING ORAL at 12:28

## 2022-02-09 RX ADMIN — FINASTERIDE 5 MILLIGRAM(S): 5 TABLET, FILM COATED ORAL at 12:28

## 2022-02-09 RX ADMIN — APIXABAN 5 MILLIGRAM(S): 2.5 TABLET, FILM COATED ORAL at 06:07

## 2022-02-09 RX ADMIN — NYSTATIN CREAM 1 APPLICATION(S): 100000 CREAM TOPICAL at 17:41

## 2022-02-09 RX ADMIN — Medication 40 MILLIEQUIVALENT(S): at 13:17

## 2022-02-09 RX ADMIN — Medication 12.5 MILLIGRAM(S): at 17:41

## 2022-02-09 RX ADMIN — APIXABAN 5 MILLIGRAM(S): 2.5 TABLET, FILM COATED ORAL at 17:41

## 2022-02-09 RX ADMIN — Medication 81 MILLIGRAM(S): at 12:28

## 2022-02-09 RX ADMIN — Medication 650 MILLIGRAM(S): at 06:48

## 2022-02-09 RX ADMIN — ATORVASTATIN CALCIUM 40 MILLIGRAM(S): 80 TABLET, FILM COATED ORAL at 21:33

## 2022-02-09 RX ADMIN — Medication 1 APPLICATION(S): at 12:27

## 2022-02-09 RX ADMIN — TAMSULOSIN HYDROCHLORIDE 0.4 MILLIGRAM(S): 0.4 CAPSULE ORAL at 21:33

## 2022-02-09 RX ADMIN — NYSTATIN CREAM 1 APPLICATION(S): 100000 CREAM TOPICAL at 06:19

## 2022-02-09 NOTE — PROGRESS NOTE ADULT - SUBJECTIVE AND OBJECTIVE BOX
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 Patient is alert and oriented on encounter. Patient states that he cut his foot and his back was hurting. Patient is unable to provide much history due to discomfort. Patient has stage 3 ulcers on sacrum, penile ulcer. Also has chronic wounds on foot. He was found to be hypotensive on field 60/30. He was given 3L bolus in ED after which BP was still low 70/30. Patient denies fever. As per AL records no h/o abdominal pain, nausea, vomiting, diarrhea, cough.  (17 Jan 2022 15:23)      ICU Vital Signs Last 24 Hrs  T(C): 36.5 (09 Feb 2022 11:18), Max: 36.6 (09 Feb 2022 05:28)  T(F): 97.7 (09 Feb 2022 11:18), Max: 97.9 (09 Feb 2022 05:28)  HR: 96 (09 Feb 2022 11:18) (79 - 96)  BP: 111/62 (09 Feb 2022 11:18) (103/74 - 141/67)  BP(mean): --  ABP: --  ABP(mean): --  RR: 20 (09 Feb 2022 11:18) (16 - 20)  SpO2: 98% (09 Feb 2022 11:18) (95% - 99%)      Vital Signs Last 24 Hrs  T(C): 36.5 (09 Feb 2022 11:18), Max: 36.6 (09 Feb 2022 05:28)  T(F): 97.7 (09 Feb 2022 11:18), Max: 97.9 (09 Feb 2022 05:28)  HR: 96 (09 Feb 2022 11:18) (79 - 96)  BP: 111/62 (09 Feb 2022 11:18) (103/74 - 141/67)  BP(mean): --  RR: 20 (09 Feb 2022 11:18) (16 - 20)  SpO2: 98% (09 Feb 2022 11:18) (95% - 99%)                        9.5    7.07  )-----------( 168      ( 09 Feb 2022 08:21 )             28.6     02-09    145  |  112<H>  |  38<H>  ----------------------------<  115<H>  3.2<L>   |  18<L>  |  1.17    Ca    8.1<L>      09 Feb 2022 08:21  Phos  3.1     02-08  Mg     1.2     02-08    TPro  4.7<L>  /  Alb  1.6<L>  /  TBili  0.8  /  DBili  x   /  AST  21  /  ALT  8<L>  /  AlkPhos  112  02-09        CAPILLARY BLOOD GLUCOSE      POCT Blood Glucose.: 116 mg/dL (09 Feb 2022 17:08)  POCT Blood Glucose.: 127 mg/dL (09 Feb 2022 11:28)  POCT Blood Glucose.: 114 mg/dL (09 Feb 2022 07:10)  POCT Blood Glucose.: 98 mg/dL (09 Feb 2022 00:01)  POCT Blood Glucose.: 100 mg/dL (08 Feb 2022 20:45)      MEDICATIONS  (STANDING):  apixaban 5 milliGRAM(s) Oral two times a day  aspirin  chewable 81 milliGRAM(s) Oral daily  atorvastatin 40 milliGRAM(s) Oral at bedtime  collagenase Ointment 1 Application(s) Topical daily  collagenase Ointment 1 Application(s) Topical daily  finasteride 5 milliGRAM(s) Oral daily  influenza  Vaccine (HIGH DOSE) 0.7 milliLiter(s) IntraMuscular once  insulin lispro (ADMELOG) corrective regimen sliding scale   SubCutaneous every 6 hours  metoprolol tartrate 12.5 milliGRAM(s) Oral every 12 hours  mirtazapine 7.5 milliGRAM(s) Oral daily  nystatin Ointment 1 Application(s) Topical two times a day  pantoprazole    Tablet 40 milliGRAM(s) Oral before breakfast  sodium chloride 0.9%. 1000 milliLiter(s) (75 mL/Hr) IV Continuous <Continuous>  tamsulosin 0.4 milliGRAM(s) Oral at bedtime    MEDICATIONS  (PRN):  acetaminophen     Tablet .. 650 milliGRAM(s) Oral every 6 hours PRN Temp greater or equal to 38C (100.4F), Mild Pain (1 - 3)  ondansetron Injectable 4 milliGRAM(s) IV Push every 8 hours PRN Nausea and/or Vomiting    Allergies    No Known Allergies    Intolerances      PAST MEDICAL & SURGICAL HISTORY:  HTN (hypertension)    HLD (hyperlipidemia)    DM (diabetes mellitus)    CAD (coronary artery disease)    Glaucoma, angle-closure    Ken[place]ze (hard of hearing)    S/P CABG (coronary artery bypass graft)    History of thyroid surgery      Social History:

## 2022-02-09 NOTE — PROGRESS NOTE ADULT - ASSESSMENT
Hospital course:  OREN: Patient has normal creatinine at baseline, 1.18 Nov 2021. Presented with elevated BUN/cr of 51/4.45 which resolved with treatment w/IVF. Most likely ATN from hypoperfusion. S/p 6L bolus. Nephro on board, Dr. Herrera.  Septic shock 2/2 ulcers vs UTI. Patient has septic shock 2/2 SSTI. Completed Zosyn, Off Linezolid since 1/21/22. C/w Diflucan 100 mg qd for 5 days, completed. UA was positive and Ucx showed yeast. ID Dr Randhawa on board.   Diabetic ulcer: Patient has stage 3/4 sacral ulcer, penile ulcer, and foot ulcer. Wound care was consulted. Pt completed Zosyn, Off Linezolid since 1/21/22. Pt was treated with Diflucan 100 mg qd for 5 days. Dr Garcia consulted. CT A/P to r/o obstruction with PO contrast, negative. Peg was placed on Tuesday 2/8 by Dr Viera. Pt was monitored for 24 hours post peg placement. Pt was started on tube feedings.  Diabetes mellitus. Patient has h/o IDDM, on 10 units of Lantus at bedtime. Diet Puree renal diet. Monitor ACHS, FS before meals and at bedtime. Pt was placed on sliding scale insulin. And given Remeron for appetite.  Prophylaxis: eliquis dvt ppx and PPI gi ppx.  Palliative was consulted for goals of care and Daughter and son continue to express patient code status as full code.  Had PEG placement  Start Feeds   Discharge planning to [facility]

## 2022-02-09 NOTE — PROGRESS NOTE ADULT - NUTRITIONAL ASSESSMENT
This patient has been assessed with a concern for Malnutrition and has been determined to have a diagnosis/diagnoses of Moderate protein-calorie malnutrition and Underweight (BMI < 19).    This patient is being managed with:   Diet NPO after Midnight-     NPO Start Date: 06-Feb-2022   NPO Start Time: 23:59  Entered: Feb 6 2022 12:20PM    Diet NPO-  Entered: Jan 26 2022 10:20AM    
This patient has been assessed with a concern for Malnutrition and has been determined to have a diagnosis/diagnoses of Moderate protein-calorie malnutrition and Underweight (BMI < 19).    This patient is being managed with:   Diet Pureed-  Consistent Carbohydrate {No Snacks}  Mildly Thick Liquids (MILDTHICKLIQS)  For patients receiving Renal Replacement - No Protein Restr No Conc K No Conc Phos Low Sodium (RENAL)  Supplement Feeding Modality:  Oral  Ensure Enlive Cans or Servings Per Day:  2       Frequency:  Two Times a day  Entered: Jan 19 2022 11:27AM    
This patient has been assessed with a concern for Malnutrition and has been determined to have a diagnosis/diagnoses of Moderate protein-calorie malnutrition and Underweight (BMI < 19).    This patient is being managed with:   Diet NPO with Tube Feed-  Tube Feeding Modality: Gastrostomy  Glucerna 1.5 Hernando  Total Volume for 24 Hours (mL): 960  Continuous  Starting Tube Feed Rate {mL per Hour}: 10  Increase Tube Feed Rate by (mL): 10     Every 4 hours  Until Goal Tube Feed Rate (mL per Hour): 40  Tube Feed Duration (in Hours): 24  Tube Feed Start Time: 12:30  Free Water Flush  Bolus   Total Volume per Flush (mL): 250   Frequency: Every 4 Hours  Prosource Gelatein 20 Sugar Free     Qty per Day:  1  Entered: 2022 12:32PM        PE2022  The site of Peg identified and 20Fr gastrostomy tube placed by pull technique.
This patient has been assessed with a concern for Malnutrition and has been determined to have a diagnosis/diagnoses of Moderate protein-calorie malnutrition and Underweight (BMI < 19).    This patient is being managed with:   Diet NPO-  Entered: Jan 26 2022 10:20AM    
This patient has been assessed with a concern for Malnutrition and has been determined to have a diagnosis/diagnoses of Moderate protein-calorie malnutrition and Underweight (BMI < 19).    This patient is being managed with:   Diet Pureed-  Consistent Carbohydrate {No Snacks}  Mildly Thick Liquids (MILDTHICKLIQS)  For patients receiving Renal Replacement - No Protein Restr No Conc K No Conc Phos Low Sodium (RENAL)  Supplement Feeding Modality:  Oral  Ensure Enlive Cans or Servings Per Day:  2       Frequency:  Two Times a day  Entered: Jan 19 2022 11:27AM    
This patient has been assessed with a concern for Malnutrition and has been determined to have a diagnosis/diagnoses of Moderate protein-calorie malnutrition and Underweight (BMI < 19).    This patient is being managed with:   Diet Pureed-  Consistent Carbohydrate {No Snacks}  Mildly Thick Liquids (MILDTHICKLIQS)  For patients receiving Renal Replacement - No Protein Restr No Conc K No Conc Phos Low Sodium (RENAL)  Supplement Feeding Modality:  Oral  Ensure Enlive Cans or Servings Per Day:  2       Frequency:  Two Times a day  Entered: Jan 19 2022 11:27AM    
This patient has been assessed with a concern for Malnutrition and has been determined to have a diagnosis/diagnoses of Moderate protein-calorie malnutrition and Underweight (BMI < 19).    This patient is being managed with:   Diet NPO-  Entered: Jan 26 2022 10:20AM    
This patient has been assessed with a concern for Malnutrition and has been determined to have a diagnosis/diagnoses of Moderate protein-calorie malnutrition and Underweight (BMI < 19).    This patient is being managed with:   Diet NPO-  Entered: Jan 26 2022 10:20AM    
This patient has been assessed with a concern for Malnutrition and has been determined to have a diagnosis/diagnoses of Moderate protein-calorie malnutrition and Underweight (BMI < 19).    This patient is being managed with:   Diet NPO after Midnight-     NPO Start Date: 06-Feb-2022   NPO Start Time: 23:59  Entered: Feb 6 2022 12:20PM    Diet NPO-  Entered: Jan 26 2022 10:20AM    
This patient has been assessed with a concern for Malnutrition and has been determined to have a diagnosis/diagnoses of Moderate protein-calorie malnutrition and Underweight (BMI < 19).    This patient is being managed with:   Diet NPO-  Entered: Jan 26 2022 10:20AM    
This patient has been assessed with a concern for Malnutrition and has been determined to have a diagnosis/diagnoses of Moderate protein-calorie malnutrition and Underweight (BMI < 19).    This patient is being managed with:   Diet NPO after Midnight-     NPO Start Date: 06-Feb-2022   NPO Start Time: 23:59  Entered: Feb 6 2022 12:20PM    Diet NPO-  Entered: Jan 26 2022 10:20AM    
This patient has been assessed with a concern for Malnutrition and has been determined to have a diagnosis/diagnoses of Moderate protein-calorie malnutrition and Underweight (BMI < 19).    This patient is being managed with:   Diet NPO-  Entered: Jan 26 2022 10:20AM    

## 2022-02-09 NOTE — PROGRESS NOTE ADULT - SUBJECTIVE AND OBJECTIVE BOX
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 Patient is alert and oriented on encounter. Patient states that he cut his foot and his back was hurting. Patient is unable to provide much history due to discomfort. Patient has stage 3 ulcers on sacrum, penile ulcer. Also has chronic wounds on foot. He was found to be hypotensive on field 60/30. He was given 3L bolus in ED after which BP was still low 70/30. Patient denies fever. As per AL records no h/o abdominal pain, nausea, vomiting, diarrhea, cough.  \    INTERIM EVENTS:Patient seen at bedside ,interim events noted.  Patient had PEG ttube placed-no bleedind at site to start feeding today  Afebrile      PMH -reviewed admission note, no change since admission  Heart Failure: Acute [ ] Chronic [ ] Acute on Chronic [ ] Diastolic [ ] Systolic [ ] Combined Systolic and Diastolic[ ]  OREN[ ]  ATN[ ]  CKD I [ ] CKDII [ ] CKD III [ ] CKD IV [ ] CKD V [ ] ESRD[ ]  HTN[ ] CVA[ ] DM[ ] COPD[ ] COVID[ ] AF[ ]  PPM[ ] ICD[ ]    MEDICATIONS  (STANDING):  apixaban 5 milliGRAM(s) Oral two times a day  aspirin  chewable 81 milliGRAM(s) Oral daily  atorvastatin 40 milliGRAM(s) Oral at bedtime  collagenase Ointment 1 Application(s) Topical daily  collagenase Ointment 1 Application(s) Topical daily  finasteride 5 milliGRAM(s) Oral daily  influenza  Vaccine (HIGH DOSE) 0.7 milliLiter(s) IntraMuscular once  insulin lispro (ADMELOG) corrective regimen sliding scale   SubCutaneous every 6 hours  metoprolol tartrate 12.5 milliGRAM(s) Oral every 12 hours  mirtazapine 7.5 milliGRAM(s) Oral daily  nystatin Ointment 1 Application(s) Topical two times a day  pantoprazole    Tablet 40 milliGRAM(s) Oral before breakfast  sodium chloride 0.9%. 1000 milliLiter(s) (75 mL/Hr) IV Continuous <Continuous>  tamsulosin 0.4 milliGRAM(s) Oral at bedtime    MEDICATIONS  (PRN):  acetaminophen     Tablet .. 650 milliGRAM(s) Oral every 6 hours PRN Temp greater or equal to 38C (100.4F), Mild Pain (1 - 3)  ondansetron Injectable 4 milliGRAM(s) IV Push every 8 hours PRN Nausea and/or Vomiting            REVIEW OF SYSTEMS:  Constitutional: [ ] fever, [ ]weight loss,  [ ]fatigue  Eyes: [ ] visual changes  Respiratory: [ ]shortness of breath;  [ ] cough, [ ]wheezing, [ ]chills, [ ]hemoptysis  Cardiovascular: [ ] chest pain, [ ]palpitations, [ ]dizziness,  [ ]leg swelling[ ]orthopnea[ ]PND  Gastrointestinal: [ ] abdominal pain, [ ]nausea, [ ]vomiting,  [ ]diarrhea [ ]Constipation [ ]Melena  Genitourinary: [ ] dysuria, [ ] hematuria [ ][place]  Neurologic: [ ] headaches [ ] tremors[ ]weakness [ ]Paralysis Right[ ] Left[ ]  Skin: [ ] itching, [ ]burning, [ ] rashes  Endocrine: [ ] heat or cold intolerance  Musculoskeletal: [ ] joint pain or swelling; [ ] muscle, back, or extremity pain  Psychiatric: [ ] depression, [ ]anxiety, [ ]mood swings, or [ ]difficulty sleeping  Hematologic: [ ] easy bruising, [ ] bleeding gums    [ ] All remaining systems negative except as per above.   [ ]Unable to obtain.  [x] No change in ROS since admission      Vital Signs Last 24 Hrs  T(C): 36.5 (09 Feb 2022 11:18), Max: 36.6 (09 Feb 2022 05:28)  T(F): 97.7 (09 Feb 2022 11:18), Max: 97.9 (09 Feb 2022 05:28)  HR: 96 (09 Feb 2022 11:18) (79 - 100)  BP: 111/62 (09 Feb 2022 11:18) (103/74 - 141/67)  RR: 20 (09 Feb 2022 11:18) (16 - 20)  SpO2: 98% (09 Feb 2022 11:18) (95% - 99%)  I&O's Summary    08 Feb 2022 07:01  -  09 Feb 2022 07:00  --------------------------------------------------------  IN: 0 mL / OUT: 425 mL / NET: -425 mL    09 Feb 2022 07:01  -  09 Feb 2022 16:07  --------------------------------------------------------  IN: 0 mL / OUT: 100 mL / NET: -100 mL        PHYSICAL EXAM:  General: No acute distress BMI-26  HEENT: EOMI, PERRL  Neck: Supple, [ ] JVD  Lungs: Equal air entry bilaterally; [ ] rales [ ] wheezing [ ] rhonchi  Heart: Regular rate and rhythm; [x ] murmur   2/6 [ x] systolic [ ] diastolic [ ] radiation[ ] rubs [ ]  gallops  Abdomen: Nontender, bowel sounds present  Extremities: No clubbing, cyanosis, [ ] edema [ ]Pulses  equal and intact  Nervous system:  Alert & Oriented X1 no focal deficits  Psychiatric: Normal affect  Skin: No rashes or lesions    LABS:  02-09    145  |  112<H>  |  38<H>  ----------------------------<  115<H>  3.2<L>   |  18<L>  |  1.17    Ca    8.1<L>      09 Feb 2022 08:21  Phos  3.1     02-08  Mg     1.2     02-08    TPro  4.7<L>  /  Alb  1.6<L>  /  TBili  0.8  /  DBili  x   /  AST  21  /  ALT  8<L>  /  AlkPhos  112  02-09    Creatinine Trend: 1.17<--, 0.96<--, 0.98<--, 1.01<--, 0.94<--, 0.97<--                        9.5    7.07  )-----------( 168      ( 09 Feb 2022 08:21 )             28.6

## 2022-02-10 VITALS
TEMPERATURE: 98 F | RESPIRATION RATE: 18 BRPM | OXYGEN SATURATION: 96 % | DIASTOLIC BLOOD PRESSURE: 63 MMHG | HEART RATE: 96 BPM | SYSTOLIC BLOOD PRESSURE: 117 MMHG

## 2022-02-10 LAB
ALBUMIN SERPL ELPH-MCNC: 1.5 G/DL — LOW (ref 3.5–5)
ALP SERPL-CCNC: 113 U/L — SIGNIFICANT CHANGE UP (ref 40–120)
ALT FLD-CCNC: 8 U/L DA — LOW (ref 10–60)
ANION GAP SERPL CALC-SCNC: 10 MMOL/L — SIGNIFICANT CHANGE UP (ref 5–17)
AST SERPL-CCNC: 20 U/L — SIGNIFICANT CHANGE UP (ref 10–40)
BILIRUB SERPL-MCNC: 0.6 MG/DL — SIGNIFICANT CHANGE UP (ref 0.2–1.2)
BUN SERPL-MCNC: 43 MG/DL — HIGH (ref 7–18)
CALCIUM SERPL-MCNC: 8 MG/DL — LOW (ref 8.4–10.5)
CHLORIDE SERPL-SCNC: 113 MMOL/L — HIGH (ref 96–108)
CO2 SERPL-SCNC: 21 MMOL/L — LOW (ref 22–31)
CREAT SERPL-MCNC: 1.15 MG/DL — SIGNIFICANT CHANGE UP (ref 0.5–1.3)
GLUCOSE BLDC GLUCOMTR-MCNC: 109 MG/DL — HIGH (ref 70–99)
GLUCOSE BLDC GLUCOMTR-MCNC: 111 MG/DL — HIGH (ref 70–99)
GLUCOSE BLDC GLUCOMTR-MCNC: 128 MG/DL — HIGH (ref 70–99)
GLUCOSE BLDC GLUCOMTR-MCNC: 132 MG/DL — HIGH (ref 70–99)
GLUCOSE BLDC GLUCOMTR-MCNC: 147 MG/DL — HIGH (ref 70–99)
GLUCOSE SERPL-MCNC: 149 MG/DL — HIGH (ref 70–99)
HCT VFR BLD CALC: 28.3 % — LOW (ref 39–50)
HGB BLD-MCNC: 9.4 G/DL — LOW (ref 13–17)
MCHC RBC-ENTMCNC: 29 PG — SIGNIFICANT CHANGE UP (ref 27–34)
MCHC RBC-ENTMCNC: 33.2 GM/DL — SIGNIFICANT CHANGE UP (ref 32–36)
MCV RBC AUTO: 87.3 FL — SIGNIFICANT CHANGE UP (ref 80–100)
NRBC # BLD: 0 /100 WBCS — SIGNIFICANT CHANGE UP (ref 0–0)
PLATELET # BLD AUTO: 178 K/UL — SIGNIFICANT CHANGE UP (ref 150–400)
POTASSIUM SERPL-MCNC: 3.9 MMOL/L — SIGNIFICANT CHANGE UP (ref 3.5–5.3)
POTASSIUM SERPL-SCNC: 3.9 MMOL/L — SIGNIFICANT CHANGE UP (ref 3.5–5.3)
PROT SERPL-MCNC: 4.6 G/DL — LOW (ref 6–8.3)
RBC # BLD: 3.24 M/UL — LOW (ref 4.2–5.8)
RBC # FLD: 16.2 % — HIGH (ref 10.3–14.5)
SODIUM SERPL-SCNC: 144 MMOL/L — SIGNIFICANT CHANGE UP (ref 135–145)
WBC # BLD: 5.99 K/UL — SIGNIFICANT CHANGE UP (ref 3.8–10.5)
WBC # FLD AUTO: 5.99 K/UL — SIGNIFICANT CHANGE UP (ref 3.8–10.5)

## 2022-02-10 PROCEDURE — 86900 BLOOD TYPING SEROLOGIC ABO: CPT

## 2022-02-10 PROCEDURE — 70450 CT HEAD/BRAIN W/O DYE: CPT | Mod: MA

## 2022-02-10 PROCEDURE — 86901 BLOOD TYPING SEROLOGIC RH(D): CPT

## 2022-02-10 PROCEDURE — 83605 ASSAY OF LACTIC ACID: CPT

## 2022-02-10 PROCEDURE — 82310 ASSAY OF CALCIUM: CPT

## 2022-02-10 PROCEDURE — 83970 ASSAY OF PARATHORMONE: CPT

## 2022-02-10 PROCEDURE — 82962 GLUCOSE BLOOD TEST: CPT

## 2022-02-10 PROCEDURE — 85025 COMPLETE CBC W/AUTO DIFF WBC: CPT

## 2022-02-10 PROCEDURE — 87640 STAPH A DNA AMP PROBE: CPT

## 2022-02-10 PROCEDURE — 86850 RBC ANTIBODY SCREEN: CPT

## 2022-02-10 PROCEDURE — 36415 COLL VENOUS BLD VENIPUNCTURE: CPT

## 2022-02-10 PROCEDURE — 85027 COMPLETE CBC AUTOMATED: CPT

## 2022-02-10 PROCEDURE — P9040: CPT

## 2022-02-10 PROCEDURE — 90662 IIV NO PRSV INCREASED AG IM: CPT

## 2022-02-10 PROCEDURE — 85610 PROTHROMBIN TIME: CPT

## 2022-02-10 PROCEDURE — 99233 SBSQ HOSP IP/OBS HIGH 50: CPT

## 2022-02-10 PROCEDURE — 80048 BASIC METABOLIC PNL TOTAL CA: CPT

## 2022-02-10 PROCEDURE — 84100 ASSAY OF PHOSPHORUS: CPT

## 2022-02-10 PROCEDURE — 99497 ADVNCD CARE PLAN 30 MIN: CPT | Mod: 25

## 2022-02-10 PROCEDURE — 87641 MR-STAPH DNA AMP PROBE: CPT

## 2022-02-10 PROCEDURE — 93005 ELECTROCARDIOGRAM TRACING: CPT

## 2022-02-10 PROCEDURE — 82306 VITAMIN D 25 HYDROXY: CPT

## 2022-02-10 PROCEDURE — 81001 URINALYSIS AUTO W/SCOPE: CPT

## 2022-02-10 PROCEDURE — 74176 CT ABD & PELVIS W/O CONTRAST: CPT

## 2022-02-10 PROCEDURE — 87086 URINE CULTURE/COLONY COUNT: CPT

## 2022-02-10 PROCEDURE — 82803 BLOOD GASES ANY COMBINATION: CPT

## 2022-02-10 PROCEDURE — L8699: CPT

## 2022-02-10 PROCEDURE — P9047: CPT

## 2022-02-10 PROCEDURE — 83735 ASSAY OF MAGNESIUM: CPT

## 2022-02-10 PROCEDURE — 85730 THROMBOPLASTIN TIME PARTIAL: CPT

## 2022-02-10 PROCEDURE — 87635 SARS-COV-2 COVID-19 AMP PRB: CPT

## 2022-02-10 PROCEDURE — 71045 X-RAY EXAM CHEST 1 VIEW: CPT

## 2022-02-10 PROCEDURE — 87040 BLOOD CULTURE FOR BACTERIA: CPT

## 2022-02-10 PROCEDURE — 36430 TRANSFUSION BLD/BLD COMPNT: CPT

## 2022-02-10 PROCEDURE — 99285 EMERGENCY DEPT VISIT HI MDM: CPT | Mod: 25

## 2022-02-10 PROCEDURE — 92610 EVALUATE SWALLOWING FUNCTION: CPT

## 2022-02-10 PROCEDURE — 86923 COMPATIBILITY TEST ELECTRIC: CPT

## 2022-02-10 PROCEDURE — 80053 COMPREHEN METABOLIC PANEL: CPT

## 2022-02-10 RX ORDER — COLLAGENASE CLOSTRIDIUM HIST. 250 UNIT/G
1 OINTMENT (GRAM) TOPICAL
Qty: 0 | Refills: 0 | DISCHARGE
Start: 2022-02-10

## 2022-02-10 RX ORDER — APIXABAN 2.5 MG/1
1 TABLET, FILM COATED ORAL
Qty: 0 | Refills: 0 | DISCHARGE
Start: 2022-02-10

## 2022-02-10 RX ORDER — METOPROLOL TARTRATE 50 MG
0.5 TABLET ORAL
Qty: 30 | Refills: 0
Start: 2022-02-10 | End: 2022-03-11

## 2022-02-10 RX ORDER — ASPIRIN/CALCIUM CARB/MAGNESIUM 324 MG
1 TABLET ORAL
Qty: 0 | Refills: 0 | DISCHARGE
Start: 2022-02-10

## 2022-02-10 RX ORDER — MULTIVIT-MIN/FERROUS GLUCONATE 9 MG/15 ML
1 LIQUID (ML) ORAL
Qty: 0 | Refills: 0 | DISCHARGE
Start: 2022-02-10

## 2022-02-10 RX ORDER — NYSTATIN CREAM 100000 [USP'U]/G
1 CREAM TOPICAL
Qty: 0 | Refills: 0 | DISCHARGE
Start: 2022-02-10

## 2022-02-10 RX ORDER — FINASTERIDE 5 MG/1
1 TABLET, FILM COATED ORAL
Qty: 0 | Refills: 0 | DISCHARGE
Start: 2022-02-10

## 2022-02-10 RX ORDER — PANTOPRAZOLE SODIUM 20 MG/1
1 TABLET, DELAYED RELEASE ORAL
Qty: 0 | Refills: 0 | DISCHARGE
Start: 2022-02-10

## 2022-02-10 RX ORDER — MULTIVIT-MIN/FERROUS GLUCONATE 9 MG/15 ML
15 LIQUID (ML) ORAL DAILY
Refills: 0 | Status: DISCONTINUED | OUTPATIENT
Start: 2022-02-10 | End: 2022-02-10

## 2022-02-10 RX ORDER — MIRTAZAPINE 45 MG/1
1 TABLET, ORALLY DISINTEGRATING ORAL
Qty: 0 | Refills: 0 | DISCHARGE
Start: 2022-02-10

## 2022-02-10 RX ORDER — TAMSULOSIN HYDROCHLORIDE 0.4 MG/1
1 CAPSULE ORAL
Qty: 0 | Refills: 0 | DISCHARGE
Start: 2022-02-10

## 2022-02-10 RX ADMIN — Medication 12.5 MILLIGRAM(S): at 17:08

## 2022-02-10 RX ADMIN — Medication 1 APPLICATION(S): at 11:49

## 2022-02-10 RX ADMIN — FINASTERIDE 5 MILLIGRAM(S): 5 TABLET, FILM COATED ORAL at 11:50

## 2022-02-10 RX ADMIN — APIXABAN 5 MILLIGRAM(S): 2.5 TABLET, FILM COATED ORAL at 17:08

## 2022-02-10 RX ADMIN — PANTOPRAZOLE SODIUM 40 MILLIGRAM(S): 20 TABLET, DELAYED RELEASE ORAL at 06:18

## 2022-02-10 RX ADMIN — NYSTATIN CREAM 1 APPLICATION(S): 100000 CREAM TOPICAL at 17:08

## 2022-02-10 RX ADMIN — Medication 81 MILLIGRAM(S): at 11:49

## 2022-02-10 RX ADMIN — Medication 12.5 MILLIGRAM(S): at 06:18

## 2022-02-10 RX ADMIN — Medication 0: at 06:17

## 2022-02-10 RX ADMIN — NYSTATIN CREAM 1 APPLICATION(S): 100000 CREAM TOPICAL at 06:18

## 2022-02-10 RX ADMIN — MIRTAZAPINE 7.5 MILLIGRAM(S): 45 TABLET, ORALLY DISINTEGRATING ORAL at 11:50

## 2022-02-10 RX ADMIN — APIXABAN 5 MILLIGRAM(S): 2.5 TABLET, FILM COATED ORAL at 06:17

## 2022-02-10 RX ADMIN — INFLUENZA VIRUS VACCINE 0.7 MILLILITER(S): 15; 15; 15; 15 SUSPENSION INTRAMUSCULAR at 18:20

## 2022-02-10 RX ADMIN — Medication 0: at 02:15

## 2022-02-10 NOTE — PROGRESS NOTE ADULT - PROBLEM SELECTOR PROBLEM 4
Diabetes mellitus
Functional quadriplegia

## 2022-02-10 NOTE — PROGRESS NOTE ADULT - CONVERSATION DETAILS
received call from daughter to discuss overall prognosis. Met with daughter at bedside, discussed remains at high risk for recurrent hospitalizations, further complications/decline despite artificial feeds due to advancing illness, debilitated state. She verbalized understanding, stated she and her brother have been discussing his declining condition. Readdressed risks/benefits of LST such as CPR/intubation in the context of his current state, she stated she is going to speak w her brother today about that, agrees at this time, those measures unlikely to help him and likely would only prolong suffering. Discussed that if patient continues to decline despite PEG feeds at NH, they can transition to hospice at that time and she stated that is what they were considering. Support provided.
Due to the patient's health status and restrictions on visitation during the Public Health Emergency, the Advance Care Planning service was performed via telephone with patient's __daughter_______.     Spoke with patient's daughter Arabella regarding further GOC. She expressed that she has discussed w her brother and they want to proceed w PEG placement to provide nutritional support. Regarding code status, at this time, they would want CPR attempted and intubation as necessary. They wish to continue all measures to prolong his life. Made aware that given debilitated state and comorbidities, unlikely to benefit from aggressive measures. She verbalized understanding. Support provided. Remains FULL CODE at this time.
Met with daughter and son at bedside regarding patient's current condition, trajectory of advanced dementia, npo status, goals of care. Discussed risks/benefits of LST such as CPR, intubation, artificial nutrition/PEG in the context of advanced illness. Discussed options for comfort feeds/hospice vs artificial nutrition/PEG. Pt did not cooperate w SLP yesterday. Son expressed feeling shocked over how much weight his father has lost since going into the facility. They wish to see how he does w SLP. They may consider a PEG. They will discuss code status. Support provided.

## 2022-02-10 NOTE — DISCHARGE NOTE NURSING/CASE MANAGEMENT/SOCIAL WORK - NSDCPEFALRISK_GEN_ALL_CORE
For information on Fall & Injury Prevention, visit: [email or web address] OR  [email or web address] OR  https://www.cdc.gov/steadi/patient.html

## 2022-02-10 NOTE — PROGRESS NOTE ADULT - PROBLEM SELECTOR PLAN 5
eliquis dvt ppx  PPI gi ppx
Clinical evidence indicates that the patient has Severe protein calorie malnutrition/ 3rd degree    In context of    Chronic Illness (>1 month)    Energy/Food intake <50% of estimated energy requirement >5 days     Body Fat loss: Severe   (Cachexia, temporal wasting, muscle atrophy)  Muscle mass loss: Severe  (Skin failure/pressure ulcers)    Strength: weakened severe (bedbound)    Recommend:   pleasure feeds as tolerated - aspiration precautions, careful hand-feeding, teaching to caregivers  nutritional supplements as tolerated, nutrition consult    SLP evaluation pending, pt did not cooperate yesterday. Family considering PEG.
eliquis dvt ppx  PPI gi ppx
Clinical evidence indicates that the patient has Severe protein calorie malnutrition/ 3rd degree    In context of    Chronic Illness (>1 month)    Energy/Food intake <50% of estimated energy requirement >5 days     Body Fat loss: Severe   (Cachexia, temporal wasting, muscle atrophy)  Muscle mass loss: Severe  (Skin failure/pressure ulcers)    Strength: weakened severe (bedbound)    Recommend:   pleasure feeds as tolerated - aspiration precautions, careful hand-feeding, teaching to caregivers  nutritional supplements as tolerated, nutrition consult    SLP evaluation pending, pt did not cooperate yesterday. Family considering PEG.
Clinical evidence indicates that the patient has Severe protein calorie malnutrition/ 3rd degree    In context of    Chronic Illness (>1 month)    Energy/Food intake <50% of estimated energy requirement >5 days     Body Fat loss: Severe   (Cachexia, temporal wasting, muscle atrophy)  Muscle mass loss: Severe  (Skin failure/pressure ulcers)    Strength: weakened severe (bedbound)    Recommend:   pleasure feeds as tolerated - aspiration precautions, careful hand-feeding, teaching to caregivers  nutritional supplements as tolerated, nutrition consult    SLP evaluation pending, pt did not cooperate. Family wishes to proceed w PEG, risks/benefits addressed.
eliquis dvt ppx  PPI gi ppx
Clinical evidence indicates that the patient has Severe protein calorie malnutrition/ 3rd degree    In context of    Chronic Illness (>1 month)    Energy/Food intake <50% of estimated energy requirement >5 days     Body Fat loss: Severe   (Cachexia, temporal wasting, muscle atrophy)  Muscle mass loss: Severe  (Skin failure/pressure ulcers)    Strength: weakened severe (bedbound)    Recommend:   pleasure feeds as tolerated - aspiration precautions, careful hand-feeding, teaching to caregivers  nutritional supplements as tolerated, nutrition consult    s/p PEG, TF held this am due to secretions, resume at lower rate as tolerated.

## 2022-02-10 NOTE — PROGRESS NOTE ADULT - PROBLEM SELECTOR PROBLEM 1
OREN (acute kidney injury)
OREN (acute kidney injury)
Vascular dementia
OREN (acute kidney injury)
Vascular dementia
OREN (acute kidney injury)
Vascular dementia
OREN (acute kidney injury)
Vascular dementia

## 2022-02-10 NOTE — PROGRESS NOTE ADULT - PROBLEM SELECTOR PLAN 2
Septic shock 2/2 ulcers vs UTI  Patient has septic shock 2/2 SSTI  Completed Zosyn, Off Linezolid since 1/21/22  C/w Diflucan 100 mg qd for 5 days, completed  UA +ve and Ucx yeast like   ID Dr Randhawa on board  For PEG tube  palliative care consulted
Septic shock 2/2 ulcers vs UTI  Patient has septic shock 2/2 SSTI  Completed Zosyn, Off Linezolid since 1/21/22  C/w Diflucan 100 mg qd for 5 days, completed  UA +ve and Ucx yeast like   ID Dr Randhawa on board  For PEG tube  palliative care consulted
Septic shock 2/2 ulcers vs UTI  Patient has septic shock 2/2 SSTI  Completed Zosyn, Off Linezolid since 1/21/22  C/w Diflucan 100 mg qd for 5 days, completed  UA +ve and Ucx yeast like   ID Dr Randhawa on board     NPO pending SLP re-eval  palliative care consulted
adm w septic shock 2/2 ulcers vs UTI, off pressors, since downgraded from ICU. completed antibiotics. ID following.
Septic shock 2/2 ulcers vs UTI  Patient has septic shock 2/2 SSTI  Completed Zosyn, Off Linezolid since 1/21/22  C/w Diflucan 100 mg qd for 5 days, completed  UA +ve and Ucx yeast like   ID Dr Randhawa on board    w/ dysphagia this afternoon, now NPO pending SLP eval  palliative care consulted
Septic shock 2/2 ulcers vs UTI  Patient has septic shock 2/2 SSTI  Completed Zosyn, Off Linezolid since 1/21/22  C/w Diflucan 100 mg qd for 5 days, completed  UA +ve and Ucx yeast like   ID Dr Randhawa on board  For PEG tube  palliative care consulted
Septic shock 2/2 ulcers vs UTI  Patient has septic shock 2/2 SSTI  Completed Zosyn, Off Linezolid since 1/21/22  C/w Diflucan 100 mg qd for 5 days, completed  UA +ve and Ucx yeast like   ID Dr Randhawa on board  For PEG tube  palliative care consulted
Septic shock 2/2 ulcers vs UTI  Patient has septic shock 2/2 SSTI  Completed Zosyn, Off Linezolid since 1/21/22  C/w Diflucan 100 mg qd for 5 days, completed  UA +ve and Ucx yeast like   ID Dr Randhawa on board    w/ dysphagia this afternoon, now NPO pending SLP eval  palliative care consulted
Septic shock 2/2 ulcers vs UTI  Patient has septic shock 2/2 SSTI  Completed Zosyn, Off Linezolid since 1/21/22  C/w Diflucan 100 mg qd for 5 days, completed  UA +ve and Ucx yeast like   ID Dr Randhawa on board  For PEG tube  palliative care consulted
adm w septic shock 2/2 ulcers vs UTI, off pressors, since downgraded from ICU. completed antibiotics. ID following.
resolved  Septic shock 2/2 ulcers vs UTI  Patient has septic shock 2/2 SSTI  Completed Zosyn, Off Linezolid since 1/21/22  C/w Diflucan 100 mg qd for 5 days, completed  UA +ve and Ucx yeast like   ID Dr Randhawa on board  For PEG tube  palliative care consulted
Septic shock 2/2 ulcers vs UTI  Patient has septic shock 2/2 SSTI  Completed Zosyn, Off Linezolid since 1/21/22  C/w Diflucan 100 mg qd for 5 days, completed  UA +ve and Ucx yeast like   ID Dr Randhawa on board    w/ dysphagia this afternoon, now NPO pending SLP re-eval  palliative care consulted
Septic shock 2/2 ulcers vs UTI  Patient has septic shock 2/2 SSTI  Completed Zosyn, Off Linezolid since 1/21/22  C/w Diflucan 100 mg qd for 5 days, completed  UA +ve and Ucx yeast like   ID Dr Randhawa on board    w/ dysphagia this afternoon, now NPO pending SLP eval  palliative care consulted
resolved  Septic shock 2/2 ulcers vs UTI  Patient has septic shock 2/2 SSTI  Completed Zosyn, Off Linezolid since 1/21/22  C/w Diflucan 100 mg qd for 5 days, completed  UA +ve and Ucx yeast like   ID Dr Randhawa on board  For PEG tube  palliative care consulted
adm w septic shock 2/2 ulcers vs UTI, off pressors, since downgraded from ICU. completed antibiotics. ID following.
Septic shock 2/2 ulcers vs UTI  Patient has septic shock 2/2 SSTI  Completed Zosyn, Off Linezolid since 1/21/22  C/w Diflucan 100 mg qd for 5 days, completed  UA +ve and Ucx yeast like   ID Dr Randhawa on board    w/ dysphagia this afternoon, now NPO pending SLP eval  palliative care consulted
adm w septic shock 2/2 ulcers vs UTI, off pressors, since downgraded from ICU. completed antibiotics. ID following.
Septic shock 2/2 ulcers vs UTI  Patient has septic shock 2/2 SSTI  Completed Zosyn, Off Linezolid since 1/21/22  C/w Diflucan 100 mg qd for 5 days, completed  UA +ve and Ucx yeast like   ID Dr Randhawa on board    w/ dysphagia this afternoon, now NPO pending SLP eval  palliative care consulted
Septic shock 2/2 ulcers vs UTI  Patient has septic shock 2/2 SSTI  Completed Zosyn, Off Linezolid since 1/21/22  C/w Diflucan 100 mg qd for 5 days, completed  UA +ve and Ucx yeast like   ID Dr Randhawa on board  For PEG tube  palliative care consulted

## 2022-02-10 NOTE — PROGRESS NOTE ADULT - PROBLEM SELECTOR PROBLEM 3
Diabetic foot ulcer
OREN (acute kidney injury)
OREN (acute kidney injury)
Diabetic foot ulcer
OREN (acute kidney injury)
Diabetic foot ulcer
OREN (acute kidney injury)
Diabetic foot ulcer

## 2022-02-10 NOTE — PROGRESS NOTE ADULT - PROBLEM SELECTOR PLAN 1
usually A&Ox3 at baseline, w superimposed delirium likely 2/2 acute illness, hospitalization. 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 Remains lethargic. Hospice eligible. Family to consider if continues declining despite PEG feeds. Family to discuss code status.

## 2022-02-10 NOTE — PROGRESS NOTE ADULT - PROBLEM SELECTOR PLAN 4
Patient has h/o IDDM, on 10 units of Lantus at bedtime  Diet Puree renal diet   Monitor ACHS, FS before meals and at bedtime   HSS  C/w Remeron for appetite
2/2 acute illness, comorbidities. Total care at present. At [facility] prior to admission.
Patient has h/o IDDM, on 10 units of Lantus at bedtime  Diet Puree renal diet   Monitor ACHS, FS before meals and at bedtime   HSS  C/w Remeron for appetite
2/2 acute illness, comorbidities. Total care at present. At [facility] prior to admission.
Patient has h/o IDDM, on 10 units of Lantus at bedtime  Diet Puree renal diet   Monitor ACHS, FS before meals and at bedtime   HSS  C/w Remeron for appetite
2/2 acute illness, comorbidities. Total care at present. At [facility] prior to admission.
2/2 acute illness, comorbidities. Total care.  At [facility] prior to admission.
Patient has h/o IDDM, on 10 units of Lantus at bedtime  Diet Puree renal diet   Monitor ACHS, FS before meals and at bedtime   HSS  C/w Remeron for appetite

## 2022-02-10 NOTE — CHART NOTE - NSCHARTNOTEFT_GEN_A_CORE
Assessment:   76yMalePatient is a 76y old  Male who presents with a chief complaint of sepsis (07 Feb 2022 20:55). Pt Visited. Pt S/P PEG Placement on 02/08.  Pt is on Glucerna 1.5 2 40 ml /hr x 16 hr Providing 640 mo,960 Kcal, Protein 53 gms, free water 486 ml /day. D/W RN. Plan is for D/C to  rehab.  Endo consult noted.  Pt with Pressure  ulcer stage 3 and unstagable       Factors impacting intake: [ ] none [ ] nausea  [ ] vomiting [ ] diarrhea [ ] constipation  [ ]chewing problems [x ] swallowing issues  [ ] other:     Diet Prescription: Diet, NPO with Tube Feed:   Tube Feeding Modality: Gastrostomy  Glucerna 1.5 [place]  Total Volume for 24 Hours (mL): 960  Continuous  Starting Tube Feed Rate {mL per Hour}: 10  Increase Tube Feed Rate by (mL): 10     Every 4 hours  Until Goal Tube Feed Rate (mL per Hour): 40  Tube Feed Duration (in Hours): 24  Tube Feed Start Time: 12:30  Free Water Flush  Bolus   Total Volume per Flush (mL): 250   Frequency: Every 4 Hours  Pro source Gelatein 20 Sugar Free     Qty per Day:  1 (02-09-22 @ 12:32)  Diet, NPO after Midnight:      NPO Start Date: 06-Feb-2022,   NPO Start Time: 23:59 (02-06-22 @ 12:20)    Intake: NPO w TF     Current Weight:     Bed scale with out Blue Boots 134 lb  % Weight Change    Pertinent Medications: MEDICATIONS  (STANDING):  apixaban 5 milliGRAM(s) Oral two times a day  aspirin  chewable 81 milliGRAM(s) Oral daily  atorvastatin 40 milliGRAM(s) Oral at bedtime  collagenase Ointment 1 Application(s) Topical daily  collagenase Ointment 1 Application(s) Topical daily  finasteride 5 milliGRAM(s) Oral daily  influenza  Vaccine (HIGH DOSE) 0.7 milliLiter(s) IntraMuscular once  insulin lispro (ADMELOG) corrective regimen sliding scale   SubCutaneous every 6 hours  metoprolol tartrate 12.5 milliGRAM(s) Oral every 12 hours  mirtazapine 7.5 milliGRAM(s) Oral daily  nystatin Ointment 1 Application(s) Topical two times a day  pantoprazole    Tablet 40 milliGRAM(s) Oral before breakfast  sodium chloride 0.9%. 1000 milliLiter(s) (75 mL/Hr) IV Continuous <Continuous>  tamsulosin 0.4 milliGRAM(s) Oral at bedtime    MEDICATIONS  (PRN):  acetaminophen     Tablet .. 650 milliGRAM(s) Oral every 6 hours PRN Temp greater or equal to 38C (100.4F), Mild Pain (1 - 3)  ondansetron Injectable 4 milliGRAM(s) IV Push every 8 hours PRN Nausea and/or Vomiting    Pertinent Labs: 02-10 Na144 mmol/L Glu 149 mg/dL<H> K+ 3.9 mmol/L Cr  1.15 mg/dL BUN 43 mg/dL<H> 02-08 Phos 3.1 mg/dL 02-10 Alb 1.5 g/dL<L>     CAPILLARY BLOOD GLUCOSE      POCT Blood Glucose.: 128 mg/dL (10 Feb 2022 11:32)  POCT Blood Glucose.: 147 mg/dL (10 Feb 2022 06:08)  POCT Blood Glucose.: 132 mg/dL (10 Feb 2022 02:01)  POCT Blood Glucose.: 109 mg/dL (10 Feb 2022 00:08)  POCT Blood Glucose.: 115 mg/dL (09 Feb 2022 21:22)  POCT Blood Glucose.: 116 mg/dL (09 Feb 2022 17:08)    Skin: Unstageable  @sacrum,. stage 3 @ L hip    Estimated Needs:   [ ] no change since previous assessment  [ ] recalculated:     Previous Nutrition Diagnosis:   [ ] Inadequate Energy Intake [ ]Inadequate Oral Intake [ ] Excessive Energy Intake   [ ] Underweight [ ] Increased Nutrient Needs [ ] Overweight/Obesity   [ ] Altered GI Function [ ] Unintended Weight Loss [ ] Food & Nutrition Related Knowledge Deficit [x ] Malnutrition   Moderate      Nutrition Diagnosis is [x ] ongoing  [ ] resolved [ ] not applicable     New Nutrition Diagnosis: [ ] not applicable       Interventions:   Recommend  [ ] Change Diet To:  [ ] Nutrition Supplement  [x ] Nutrition Support  : Glucerna 1.5 initial Goal rate @  65 ml /hr x 16 hr as tolerated to Provide 1040 ml, 1560 Kcal, Protein 86 gms, free water 789 ml /day.   [x ] Other: MVim, Vitc Zinc.   (x) D/W RN     Monitoring and Evaluation:   [ ] PO intake [ x ] Tolerance to diet prescription [ x ] weights [ x ] labs[ x ] follow up per protocol  [ ] other:

## 2022-02-10 NOTE — DISCHARGE NOTE NURSING/CASE MANAGEMENT/SOCIAL WORK - FLU SEASON?
6/5/2017 10:51 AM 
 
Mr. Talon [facility] [address] To Whom It May Concern: This is to certify that the above patient is under my professional care. He was seen for a medical exam today and was found to be in good health Please have the patient call us if you should have any questions Sincerely, Nehemiah Hester NP 
 
 Yes...

## 2022-02-10 NOTE — PROGRESS NOTE ADULT - SUBJECTIVE AND OBJECTIVE BOX
follow up on:  complex medical decision making related to goals of care    OVERNIGHT EVENTS: pt gurgling this am per RN, TF held. Daughter at bedside.     Present Symptoms:      Review of Systems:   Unable to obtain due to poor mentation     MEDICATIONS  (STANDING):  apixaban 5 milliGRAM(s) Oral two times a day  aspirin  chewable 81 milliGRAM(s) Oral daily  atorvastatin 40 milliGRAM(s) Oral at bedtime  collagenase Ointment 1 Application(s) Topical daily  collagenase Ointment 1 Application(s) Topical daily  finasteride 5 milliGRAM(s) Oral daily  influenza  Vaccine (HIGH DOSE) 0.7 milliLiter(s) IntraMuscular once  insulin lispro (ADMELOG) corrective regimen sliding scale   SubCutaneous every 6 hours  metoprolol tartrate 12.5 milliGRAM(s) Oral every 12 hours  mirtazapine 7.5 milliGRAM(s) Oral daily  nystatin Ointment 1 Application(s) Topical two times a day  pantoprazole    Tablet 40 milliGRAM(s) Oral before breakfast  sodium chloride 0.9%. 1000 milliLiter(s) (75 mL/Hr) IV Continuous <Continuous>  tamsulosin 0.4 milliGRAM(s) Oral at bedtime    MEDICATIONS  (PRN):  acetaminophen     Tablet .. 650 milliGRAM(s) Oral every 6 hours PRN Temp greater or equal to 38C (100.4F), Mild Pain (1 - 3)  ondansetron Injectable 4 milliGRAM(s) IV Push every 8 hours PRN Nausea and/or Vomiting      PHYSICAL EXAM:  Vital Signs Last 24 Hrs  T(C): 36.4 (10 Feb 2022 13:11), Max: 36.8 (09 Feb 2022 19:22)  T(F): 97.5 (10 Feb 2022 13:11), Max: 98.2 (09 Feb 2022 19:22)  HR: 90 (10 Feb 2022 13:11) (90 - 109)  BP: 113/60 (10 Feb 2022 13:11) (113/60 - 128/71)  BP(mean): --  RR: 18 (10 Feb 2022 13:11) (16 - 20)  SpO2: 97% (10 Feb 2022 13:11) (97% - 98%)    Palliative Performance Scale/Karnofsky Score: 20      GENERAL:   lethargic,  cachectic,  NAD  HEENT: Atraumatic, oropharynx clear, neck supple  CHEST/LUNG: unlabored  HEART: Regular rate and rhythm    ABDOMEN: Soft, Nontender, Nondistended, PEG in place  MUSCULOSKELETAL:  No  edema,  bedbound  NERVOUS SYSTEM:    lethargic, opens eyes, not following commands  SKIN: LE ulcer, St IV sacral decub noted  Oral intake: npo      LABS:                          9.4    5.99  )-----------( 178      ( 10 Feb 2022 07:09 )             28.3     02-10    144  |  113<H>  |  43<H>  ----------------------------<  149<H>  3.9   |  21<L>  |  1.15    Ca    8.0<L>      10 Feb 2022 07:09    TPro  4.6<L>  /  Alb  1.5<L>  /  TBili  0.6  /  DBili  x   /  AST  20  /  ALT  8<L>  /  AlkPhos  113  02-10        RADIOLOGY & ADDITIONAL STUDIES:

## 2022-02-10 NOTE — CHART NOTE - NSCHARTNOTESELECT_GEN_ALL_CORE
Event Note
Family Update/Event Note
ICU Downgrade/Transfer Note
Event Note
Family Update/Event Note
Nutrition Services
Nutrition Services
Off Service Note

## 2022-02-10 NOTE — PROGRESS NOTE ADULT - PROVIDER SPECIALTY LIST ADULT
Critical Care
Infectious Disease
Infectious Disease
Nephrology
Nephrology
Palliative Care
Podiatry
Podiatry
Critical Care
Infectious Disease
Internal Medicine
Internal Medicine
Nephrology
Podiatry
Podiatry
Critical Care
Gastroenterology
Infectious Disease
Nephrology
Nephrology
Critical Care
Internal Medicine
Gastroenterology
Internal Medicine
Gastroenterology
Internal Medicine
Palliative Care
Internal Medicine
Internal Medicine
Gastroenterology
Internal Medicine
Palliative Care
Palliative Care
Gastroenterology

## 2022-02-10 NOTE — PROGRESS NOTE ADULT - PROBLEM SELECTOR PLAN 6
GOC discussion as above. Hospice eligible. Spoke with patient's daughter today regarding further GOC as patient remains lethargic, npo. She expressed that her brother is still having a difficult time coming to terms w her father's condition, has not decided about feeding tubes yet, she will ask him to call me. Support provided. Palliative will follow.
GOC discussion as above. Palliative will follow.
GOC discussion as above. Remains FULL CODE per family wishes. For PEG placement
GOC discussion as above. Family to consider hospice if continues declining despite PEG feeds, to discuss code status. Palliative will follow.

## 2022-02-10 NOTE — PROGRESS NOTE ADULT - TIME BILLING
- Review of records, telemetry, vital signs and daily labs.   - General and cardiovascular physical examination.  - Generation of cardiovascular treatment plan.  - Coordination of care.      Patient was seen and examined by me on 1/29/22,interim events noted,labs and radiology studies reviewed.  Torsten Barrett MD,FACC.  [address] [place], [place] [id number].  [phone]
- Review of records, telemetry, vital signs and daily labs.   - General and cardiovascular physical examination.  - Generation of cardiovascular treatment plan.  - Coordination of care.      Patient was seen and examined by me on 2/5/2022,interim events noted,labs and radiology studies reviewed.  Torsten Barrett MD,FACC.  [address] [place], [place] [id number].  [phone]
chart review   pt eval
discussed w primary team
chart review  pt eval
- Review of records, telemetry, vital signs and daily labs.   - General and cardiovascular physical examination.  - Generation of cardiovascular treatment plan.  - Coordination of care.      Patient was seen and examined by me on 1/25/2022,  interim events noted,labs and radiology studies reviewed.  Torsten Barrett MD,FACC.  [address] [place], [place] [id number].  [phone]
chart   review   pt eval   lesion debridement
- Review of records, telemetry, vital signs and daily labs.   - General and cardiovascular physical examination.  - Generation of cardiovascular treatment plan.  - Coordination of care.      Patient was seen and examined by me  2/6/2022, events noted,labs and radiology studies reviewed.  Torsten Barrett MD,FACC.  [address] [place], [place] [id number].  [phone]
- Review of records, telemetry, vital signs and daily labs.   - General and cardiovascular physical examination.  - Generation of cardiovascular treatment plan.  - Coordination of care.      Patient was seen and examined by me on 02/09/2022,interim events noted,labs and radiology studies reviewed.  Torsten Barrett MD,FACC.  [address] [place], [place] [id number].  [phone]
- Review of records, telemetry, vital signs and daily labs.   - General and cardiovascular physical examination.  - Generation of cardiovascular treatment plan.  - Coordination of care.      Patient was seen and examined by me on 2/8/2022,interim events noted,labs and radiology studies reviewed.  Torsten Barrett MD,FACC.  [address] [place], [place] [id number].  [phone]
discussed w primary team
discussed w primary team
- Review of records, telemetry, vital signs and daily labs.   - General and cardiovascular physical examination.  - Generation of cardiovascular treatment plan.  - Coordination of care.      Patient was seen and examined by me on 1/28/22, interim events noted,labs and radiology studies reviewed.  Torsten Barrett MD,FACC.  [address] [place], [place] [id number].  [phone]
- Review of records, telemetry, vital signs and daily labs.   - General and cardiovascular physical examination.  - Generation of cardiovascular treatment plan.  - Coordination of care.      Patient was seen and examined by me on 1/31/2022,interim events noted,labs and radiology studies reviewed.  Torsten Barrett MD,FACC.  [address] [place], [place] [id number].  [phone]
- Review of records, telemetry, vital signs and daily labs.   - General and cardiovascular physical examination.  - Generation of cardiovascular treatment plan.  - Coordination of care.      Patient was seen and examined by me on 2/2/2022,interim events noted,labs and radiology studies reviewed.  Torsten Barrett MD,FACC.  [address] [place], [place] [id number].  [phone]
- Review of records, telemetry, vital signs and daily labs.   - General and cardiovascular physical examination.  - Generation of cardiovascular treatment plan.  - Coordination of care.      Patient was seen and examined by me on 2/4/2022,interim events noted,labs and radiology studies reviewed.  Torsten Barrett MD,FACC.  [address] [place], [place] [id number].  [phone]
- Review of records, telemetry, vital signs and daily labs.   - General and cardiovascular physical examination.  - Generation of cardiovascular treatment plan.  - Coordination of care.      Patient was seen and examined by me on 1/26/22,interim events noted,labs and radiology studies reviewed.  Torsten Barrett MD,FACC.  [address] [place], [place] [id number].  [phone]
- Review of records, telemetry, vital signs and daily labs.   - General and cardiovascular physical examination.  - Generation of cardiovascular treatment plan.  - Coordination of care.      Patient was seen and examined by me on 1/27/2022,interim events noted,labs and radiology studies reviewed.  Torsten Barrett MD,FACC.  [address] [place], [place] [id number].  [phone]
- Review of records, telemetry, vital signs and daily labs.   - General and cardiovascular physical examination.  - Generation of cardiovascular treatment plan.  - Coordination of care.      Patient was seen and examined by me on 1/30/2022,interim events noted,labs and radiology studies reviewed.  Torsten Barrett MD,FACC.  [address] [place], [place] [id number].  [phone]
- Review of records, telemetry, vital signs and daily labs.   - General and cardiovascular physical examination.  - Generation of cardiovascular treatment plan.  - Coordination of care.      Patient was seen and examined by me on 2/1/2022,interim events noted,labs and radiology studies reviewed.  Torsten Barrett MD,FACC.  [address] [place], [place] [id number].  [phone]
- Review of records, telemetry, vital signs and daily labs.   - General and cardiovascular physical examination.  - Generation of cardiovascular treatment plan.  - Coordination of care.      Patient was seen and examined by me on 2/3/2022,interim events noted,labs and radiology studies reviewed.  Torsten Barrett MD,FACC.  [address] [place], [place] [id number].  [phone]
- Review of records, telemetry, vital signs and daily labs.   - General and cardiovascular physical examination.  - Generation of cardiovascular treatment plan.  - Coordination of care.      Patient was seen and examined by me on 2/7/2022, ,interim events noted,labs and radiology studies reviewed.  Torsten Barrett MD,FACC.  [address] [place], [place] [id number].  [phone]
discussed w primary team, nursing

## 2022-02-10 NOTE — DISCHARGE NOTE NURSING/CASE MANAGEMENT/SOCIAL WORK - PATIENT PORTAL LINK FT
You can access the FollowMyHealth Patient Portal offered by [facility] by registering at the following website: [email or web address]. By joining [employer]’s FollowMyHealth portal, you will also be able to view your health information using other applications (apps) compatible with our system.

## 2022-02-10 NOTE — PROGRESS NOTE ADULT - PROBLEM SELECTOR PLAN 3
OREN on CKD, likely ATN, renal following. Cr improved
Patient has stage 3/4 sacral ulcer, penile ulcer, foot ulcer  Wound care on board  Completed Zosyn, Off Linezolid since 1/21/22  C/w Diflucan 100 mg qd for 5 days   Dr Garcia on board   ID Dr Randhawa on board   on board
OREN on CKD, likely ATN, renal following. Cr improving.
Patient has stage 3/4 sacral ulcer, penile ulcer, foot ulcer  Wound care on board  Completed Zosyn, Off Linezolid since 1/21/22  C/w Diflucan 100 mg qd for 5 days   Dr Garcia on board   ID Dr Randhawa on board   on board
Patient has stage 3/4 sacral ulcer, penile ulcer, foot ulcer  Wound care on board  Completed Zosyn, Off Linezolid since 1/21/22  C/w Diflucan 100 mg qd for 5 days   Dr Garcia on board   ID Dr Randhawa on board   on board
OREN on CKD, likely ATN, renal following. Cr improving.
Patient has stage 3/4 sacral ulcer, penile ulcer, foot ulcer  Wound care on board  Completed Zosyn, Off Linezolid since 1/21/22  C/w Diflucan 100 mg qd for 5 days   Dr Garcia on board   ID Dr Randhawa on board   on board
OREN on CKD, likely ATN, renal following. Cr improving.

## 2022-02-10 NOTE — PROGRESS NOTE ADULT - PROBLEM SELECTOR PROBLEM 2
Sepsis

## 2022-02-10 NOTE — PROGRESS NOTE ADULT - PROBLEM SELECTOR PROBLEM 5
Preventive measure
Severe protein-calorie malnutrition
Preventive measure
Severe protein-calorie malnutrition

## 2022-08-03 NOTE — ED ADULT NURSE NOTE - CHPI ED NUR SYMPTOMS NEG
(E4) spontaneous no bleeding gums/no numbness/no syncope/no weakness/no vomiting/no nausea/no chills

## 2022-10-14 NOTE — PATIENT PROFILE ADULT - HAVE YOU EXPERIENCED VIOLENCE OR A TRAUMATIC EVENT?
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 Reviewed pros and cons of puberty blockers, discussed side effects, including possible mood changes, weight gain, etc. Informed that  GnRH effects are reversible upon discontinuation of therapy. GnRH agonist will prevent further virilization. Although, Jolie is very firm in her intention to transition to female gender as this is what she wanted since she "could walk and talk", GnRH agonist therapy will allow patient more time to explore her options. Since Jolie is advanced in puberty, some of the puberty signs (Garcia apple, bone structure) are irreversible at this point. \par \par I reviewed effects of future cross sex/estrogen hormone therapy and informed that some of the effect are irreversible (breast growth) and could lead to infertility. Encouraged to consult reproductive specialist, if patient desires to have biological children in the future. Jolie and her adoptive mother verbalized understanding. \par \par Patient cleared for hormonal treatment by therapist Kelsey Garcia, who was following patient in [place] and [facility] Nataliya Elliott (both letters on file). \par \par 
no

## 2022-10-18 NOTE — PROGRESS NOTE ADULT - ASSESSMENT
Calculator page.   This informational tool is a resource and is not meant for direct clinical application.   Patient Search   Multi-Patient Search   MME Calculator   Reports   Drug List   Designation   My CORY #  Data Detail Level: Printer-Friendly View Extended View  Confidential Drug Utilization Report  Search Terms: alfred villagran, 1945Search Date: 09/18/2021 13:46:54 PM  The Drug Utilization Report below displays all of the controlled substance prescriptions, if any, that your patient has filled in the last twelve months. The information displayed on this report is compiled from pharmacy submissions to the Department, and accurately reflects the information as submitted by the pharmacies.    This report was requested by: Minh Slater | Reference #: [id number]    There are no results for the search terms that you entered. no

## 2022-11-02 NOTE — PACU DISCHARGE NOTE - AIRWAY PATENCY:
Satisfactory Cartilage Graft Text: The defect edges were debeveled with a #15 scalpel blade.  Given the location of the defect, shape of the defect, the fact the defect involved a full thickness cartilage defect a cartilage graft was deemed most appropriate.  An appropriate donor site was identified, cleansed, and anesthetized. The cartilage graft was then harvested and transferred to the recipient site, oriented appropriately and then sutured into place.  The secondary defect was then repaired using a primary closure.

## 2022-12-09 NOTE — ED ADULT NURSE NOTE - NSFALLRSKASSESSTYPE_ED_ALL_ED

## 2023-01-12 NOTE — PATIENT PROFILE ADULT - DO YOU FEEL UNSAFE AT HOME, WORK, OR SCHOOL?
[facility]      Nephrology Consultation      Referring Provider:   No ref. provider found    Reason for Consultation:  Chronic kidney disease 4 and associated problems.    Subjective:  Chief complaint   Chief Complaint   Patient presents with   • Illness     History of present illness:    Patient is 78-year-old fairly small in size female with longstanding history of multiple medical problems, she is well-known to me for last 22 years.  She has done well up until recently when she had a fall and a broken hip.  Since then she has gone through rehab at [facility] and was able to drive herself to outpatient physical therapy.  Recently she is getting more forgetful and has missed her last 2 appointments.  On her last appointment when her son came with her, he did mention that anytime she has to go to her medical appointments including our office appointments she pretty much refuses.  She has been anemic and has required erythropoietin therapy as an outpatient which she has missed her last 4-5 treatments.  Recently she has been getting weaker and got to a point where she was not even able to transfer from her wheelchair to the bed.  Son finally decided to bring her for further evaluation and treatment.  It does appear that she has fallen few times as well.  During multiple office visits we have discussed about the CODE STATUS she is still wants to be a full code but did not want to discuss dialysis.  On my last discussion with her in the office it does appear that she was in agreement to be a DNR if she does not want to do anything aggressive.  Today she has agreed that she would like to go to rehab and see if she can get some better and get stronger so she can take care of herself.  She has fairly advanced osteoarthritis as well as rheumatoid arthritis and has stopped following up with rheumatology.  Currently she is lying in the bed awake alert and interactive, decide pains and aches denies  "any chest pain or shortness of breath no nausea or vomiting.  Her appetite is fairly poor.  There is no significant edema.  She was noted to have significant white count and culture sensitivity still pending on her urine.  She is being treated for a UTI.  I have reviewed labs/imaging/records from this hospitalization, including ER staff and admitting/attending physicians H/P's and progress notes to establish a comprehensive understanding of this patient's clinical hospital course, as well as to establish plan of care appropriately.   Past Medical History:   Diagnosis Date   • Anemia 1998   • Anemia    • Arthritis    • Back pain    • Bleeding from anus    • Bronchitis     STATES HAS BRONCHITIS CURRENTLY (12/5/17).     • Bronchitis 12/2017   • CAD (coronary artery disease)    • Cataract, bilateral    • Chronic kidney disease     STAGE 4.  SEES TERA   • Chronic kidney failure     REPORTS STAGE IV   • Difficulty swallowing solids    • Disease of thyroid gland    • Fracture, radius 2016    right distal radius and ulna, healed.   • GERD (gastroesophageal reflux disease)    • Gout    • High cholesterol    • History of blood transfusion 2019   • History of nuclear stress test 2014    DR. CORTEZ.  \"IT WAS OK\"   • Hyperparathyroidism (HCC)    • Hyperparathyroidism (HCC)    • Hypertension    • Impaired functional mobility, balance, gait, and endurance    • Iron deficiency    • Osteoarthritis    • Osteoarthritis of knees, bilateral     Both knees Supartz injection series August, 2015   • Osteoporosis    • Palpitations    • Personal history of cardiac murmur    • Pessary maintenance    • PONV (postoperative nausea and vomiting)    • Presence of pessary    • Problems with swallowing     WITH FOOD OCCASSIONALLY   • Rheumatic heart disease     Personal history   • Rotator cuff tendonitis     right    • Rupture of right proximal biceps tendon     chronic   • Seasonal allergies    • Sinus problem    • Spinal headache     REPORTS " AFTER DELIVERY OF SON   • Subacromial bursitis     right    • Valvular heart disease    • Vision problems    • Vitamin B12 deficiency    • Vitamin D deficiency    • Wears glasses        Past Surgical History:   Procedure Laterality Date   • CATARACT EXTRACTION Bilateral    • CERVICAL POLYPECTOMY     • COLONOSCOPY  2011   • COLONOSCOPY N/A 1/9/2018    Procedure: COLONOSCOPY with endoscopic mucosal resection (hot snare), normal saline submucosal injection, argon thermal ablation, resolution clip placement x4, and cold biopsy polypectomy;  Surgeon: Pieter Concepcion MD;  Location: [facility] ENDOSCOPY;  Service:    • ENDOSCOPY N/A 12/6/2017    Procedure: ESOPHAGOGASTRODUODENOSCOPY with biopsies and hot snare polypectomies;  Surgeon: Pieter Concepcion MD;  Location: [facility] ENDOSCOPY;  Service:    • ENDOSCOPY N/A 1/15/2019    Procedure: ESOPHAGOGASTRODUODENOSCOPY WITH BIOPSIES AND HOT SNARE POLYPECTOMIES X10;  Surgeon: Pieter Concepcion MD;  Location: [facility] ENDOSCOPY;  Service: Gastroenterology   • HIP TROCHANTERIC NAILING WITH INTRAMEDULLARY HIP SCREW Left 2/14/2021    Procedure: HIP TROCHANTER NAIL SHORT WITH INTRAMEDULLARY HIP SCREW, LEFT;  Surgeon: Gabriele Loyd MD;  Location: [facility] OR;  Service: Orthopedics;  Laterality: Left;   • UPPER GASTROINTESTINAL ENDOSCOPY  08/18/2014     Family History   Problem Relation Age of Onset   • Liver cancer Maternal Grandmother    • Gout Other    • Osteoporosis Other    • Stroke Other    • Ulcers Other    • Asthma Other    • Hypertension Other    • Heart disease Other    • Osteoarthritis Other    • Colon cancer Neg Hx      negative h/o ESRD     Social History     Tobacco Use   • Smoking status: Never   • Smokeless tobacco: Never   Vaping Use   • Vaping Use: Never used   Substance Use Topics   • Alcohol use: Never   • Drug use: Never     Home medications:   Prior to Admission Medications     Prescriptions Last Dose Informant Patient Reported? Taking?    allopurinol (ZYLOPRIM) 100 MG  tablet 1/11/2023 Self Yes Yes    Take 1 tablet by mouth daily.    aspirin 81 MG EC tablet 1/11/2023  Yes Yes    Take 81 mg by mouth Daily.    B Complex Vitamins (vitamin b complex) capsule capsule 1/11/2023  Yes Yes    Take  by mouth Daily.    budesonide-formoterol (SYMBICORT) 80-4.5 MCG/ACT inhaler Not Taking Self Yes No    Inhale 2 puffs Daily.    Patient not taking:  Reported on 1/12/2023    carboxymethylcellulose (REFRESH PLUS) 0.5 % solution Unknown  Yes No    Administer 1 drop to both eyes 3 (Three) Times a Day As Needed for Dry Eyes.    carvedilol (COREG) 25 MG tablet 1/11/2023  Yes Yes    Take 25 mg by mouth 2 (Two) Times a Day With Meals.    cholecalciferol (VITAMIN D3) 1000 UNITS tablet Not Taking Self Yes No    Take 1,000 Units by mouth Daily.    Patient not taking:  Reported on 1/12/2023    docusate sodium (COLACE) 100 MG capsule 1/11/2023  No Yes    Take 1 capsule by mouth 2 (Two) Times a Day As Needed for Constipation.    epoetin ovidio (EPOGEN,PROCRIT) 75208 UNIT/ML injection 1/3/2023 Self Yes Yes    Inject 40,000 Units under the skin into the appropriate area as directed. Every 2 weeks.  Due Wednesday, Feb 17th    estradiol (ESTRACE) 0.1 MG/GM vaginal cream Not Taking  No No    Massage 1 gram in vaginal opening twice weekly    Patient not taking:  Reported on 1/12/2023    fluticasone (FLONASE) 50 MCG/ACT nasal spray Not Taking Self Yes No    2 sprays into each nostril Daily As Needed.    Patient not taking:  Reported on 1/12/2023    irbesartan (AVAPRO) 150 MG tablet 1/11/2023 Self Yes Yes    Take 150 mg by mouth Daily.    lidocaine (LIDODERM) 5 % Not Taking  No No    Place 1 patch on the skin as directed by provider Daily. Remove & Discard patch within 12 hours or as directed by MD    Patient not taking:  Reported on 1/12/2023    omeprazole (PriLOSEC) 40 MG capsule  Self Yes No    Take 40 mg by mouth Daily.    Probiotic Product (PROBIOTIC DAILY PO) Not Taking  Yes No    Take 1 tablet by mouth Daily As  Needed.    Patient not taking:  Reported on 1/12/2023    torsemide (DEMADEX) 10 MG tablet   No No    Take 1 tablet by mouth on Thursday and Monday, take as needed otherwise    Patient taking differently:  Take 5 mg by mouth Daily.        Emergency department medications:   Medications   aspirin EC tablet 81 mg (81 mg Oral Given 1/12/23 1812)   carvedilol (COREG) tablet 25 mg (25 mg Oral Given 1/12/23 1812)   losartan (COZAAR) tablet 50 mg (50 mg Oral Given 1/12/23 1812)   pantoprazole (PROTONIX) EC tablet 40 mg (has no administration in time range)   torsemide (DEMADEX) tablet 5 mg (has no administration in time range)   sodium chloride 0.9 % flush 10 mL (has no administration in time range)   sodium chloride 0.9 % flush 10 mL (has no administration in time range)   sodium chloride 0.9 % infusion 40 mL (has no administration in time range)   acetaminophen (TYLENOL) tablet 650 mg (has no administration in time range)     Or   acetaminophen (TYLENOL) 160 MG/5ML solution 650 mg (has no administration in time range)     Or   acetaminophen (TYLENOL) suppository 650 mg (has no administration in time range)   ondansetron (ZOFRAN) injection 4 mg (has no administration in time range)   heparin (porcine) 5000 UNIT/ML injection 5,000 Units (has no administration in time range)   cefTRIAXone (ROCEPHIN) IVPB 1 g/50ml dextrose (premix) (has no administration in time range)   sodium chloride 0.9 % bolus 500 mL (0 mL Intravenous Stopped 1/12/23 1006)   cefTRIAXone (ROCEPHIN) IVPB 1 g/50ml dextrose (premix) (0 g Intravenous Stopped 1/12/23 1313)       Allergies:  Ferrlecit [na ferric gluc cplx in sucrose], Ranitidine hcl, Levofloxacin, and Lisinopril    Review of Systems  All review of system were reviewed and are negative except as mentioned in the history of present illness and assessment and plan.    Objective:  Vital Signs  /78 (BP Location: Right arm, Patient Position: Lying)   Pulse 90   Temp 97.5 °F (36.4 °C) (Oral)   " Resp 16   Ht 149.9 cm (59\")   Wt 45.8 kg (100 lb 14.4 oz)   LMP  (LMP Unknown)   SpO2 98%   BMI 20.38 kg/m²          I/O this shift:  In: 550 [IV Piggyback:550]  Out: -     Intake/Output Summary (Last 24 hours) at 1/12/2023 1851  Last data filed at 1/12/2023 1313  Gross per 24 hour   Intake 550 ml   Output --   Net 550 ml       Physical Exam:  General Appearance:   Alert, cooperative, in no acute distress.     Head:   Normocephalic, without obvious abnormality, atraumatic.     Eyes:      Normal, conjunctivae and sclerae, no icterus, no pallor, corneas clear, PERRLA        Throat:   Oral mucosa dry      Neck:  No adenopathy, supple, trachea midline, no thyromegaly, no carotid bruit, no JVD        Lungs:    Clear to auscultation and fair air movement noted.      Heart::   Regular rhythm and normal rate, normal S1 and S2.       Abdomen:   Normal bowel sounds, no masses, no organomegaly, soft non-tender, non-distended, no guarding, no rebound tenderness      Genital urinary:   No urinary bladder palpable      Extremities:  Moves all extremities, no edema, no cyanosis, no redness.  She has significant deformities of her hand and feet.     Pulses:  Pulses palpable and equal bilaterally but weak.     Skin:  No bleeding, bruising or rash        Neurologic:  Cranial nerves grossly intact, move all extremities         Results Review:   Results from last 7 days   Lab Units 01/12/23  0937   SODIUM mmol/L 133*   POTASSIUM mmol/L 5.1   CHLORIDE mmol/L 102   CO2 mmol/L 19.2*   BUN mg/dL 46*   CREATININE mg/dL 3.82*   CALCIUM mg/dL 8.9   ALBUMIN g/dL 3.2*   BILIRUBIN mg/dL 0.3   ALK PHOS U/L 134*   ALT (SGPT) U/L 13   AST (SGOT) U/L 30   GLUCOSE mg/dL 106*     Estimated Creatinine Clearance: 8.5 mL/min (A) (by C-G formula based on SCr of 3.82 mg/dL (H)).          Results from last 7 days   Lab Units 01/12/23  0937   WBC 10*3/mm3 8.14   HEMOGLOBIN g/dL 9.4*   PLATELETS 10*3/mm3 203         Brief Urine Lab Results  (Last " result in the past 365 days)      Color   Clarity   Blood   Leuk Est   Nitrite   Protein   CREAT   Urine HCG        01/12/23 0937 Yellow   Turbid   Small (1+)   Large (3+)   Negative   30 mg/dL (1+)               No results found for: UTPCR  Imaging Results (Last 24 Hours)     Procedure Component Value Units Date/Time    CT Abdomen Pelvis Without Contrast [[id number]] Collected: 01/12/23 1229     Updated: 01/12/23 1245    Narrative:      PROCEDURE: CT ABDOMEN PELVIS WO CONTRAST-     HISTORY: UTI; N30.00-Acute cystitis without hematuria; N18.9-Chronic  kidney disease, unspecified     COMPARISON: 11/20/2019 and left hip image of 07/12/2022...     PROCEDURE: Axial images were obtained from the lung bases to the pubic  symphysis by computed tomography. This study was performed with  techniques to keep radiation doses as low as reasonably achievable,  (ALARA). Individualized dose reduction techniques using automated  exposure control or adjustment of mA and/or kV according to the patient  size were employed.     FINDINGS:      ABDOMEN: The lung bases are clear. The heart is mildly enlarged with  mild pericardial effusion, new from prior exam. The limited non-contrast  images of the liver demonstrate a stable, 11 mm, hypodense lesion  measuring less than 20 Hounsfield units consistent with a cyst. Liver  otherwise appears normal. Gallbladder present with no CT visible stones.  The spleen is unremarkable. No adrenal masses are seen. The aorta is  normal in caliber. There is no significant free fluid or adenopathy.   There is no nephrolithiasis. There is no hydronephrosis. There are  bilateral, circumscribed, isodense renal lesions likely cysts and not  significant change from the prior exam. The largest measures 86 mm in  the craniocaudad dimension and has a rim calcification, stable from  prior. Streak artifact from patient's arm position noted. Vascular  calcifications noted.     PELVIS: The GI tract demonstrate no  obstruction. The appendix is  identified and appears normal. The urinary bladder is partially filled  and appears unremarkable. Uterus is midline. There is no fluid or  adenopathy.  There is streak artifact from fixation hardware in the left  proximal femur. As seen on recent radiographs there is extension of the  compression screw into the left hip joint with adjacent remodeling of  the superior acetabulum; appearance is similar to the prior exam. There  is also nonunion of the previous set intertrochanteric fracture. Diffuse  osteopenia noted. Best seen on series 3 image 103 and series 4 image 69  are areas of increased attenuation in the fat of the medial inferior  left buttock likely representing hematoma from previous fall.Pessary is  noted.       Impression:      New, mild pericardial effusion.     Stable, bilateral, simple appearing renal cystic lesions and hepatic  cystic lesion compared to prior exam.     Suspected hematoma formation in the left inferior medial buttock as  described.     Stable chronic changes involving fixation hardware of the proximal left  femur compared to 07/12/2022.     This report was signed and finalized on 1/12/2023 12:43 PM by Angela Pablo MD.    CT Lumbar Spine Without Contrast [[id number]] Collected: 01/12/23 1226     Updated: 01/12/23 1230    Narrative:         PROCEDURE: CT LUMBAR SPINE WO CONTRAST-     HISTORY: fall; N30.00-Acute cystitis without hematuria; N18.9-Chronic  kidney disease, unspecified, compare 07/12/2022.     PROCEDURE: Axial images were obtained through the lumbar spine by  computed tomography. Reconstruction images were also performed. This  study was performed with techniques to keep radiation doses as low as  reasonably achievable, (ALARA). Individualized dose reduction techniques  using automated exposure control or adjustment of mA and/or kV according  to the patient size were employed.     FINDINGS: Again noted is minimal anterolisthesis of L4 and L5 and  grade  1 anterolisthesis of L5 on S1; this is stable from the prior exam. The  vertebral bodies are of proper height. The facets overlap at all levels.  Moderate, diffuse osteopenia noted. Vascular calcifications noted. Small  osteophytes noted at all levels.     L1-L2: No significant disc disease is present. There is no stenosis.     L2-L3: No significant disc disease is present. There is no stenosis.     L3-L4: No significant disc disease is present. There is no stenosis.     L4-L5: There is moderate annular bulge causing mild spinal stenosis.  There is moderate, bilateral neural foraminal narrowing.     L5-S1: There is moderate annular bulge causing mild spinal stenosis.  There is moderate, bilateral neural foraminal narrowing.       Impression:      No acute abnormality.     Multilevel lumbar generative disc disease, similar to prior.           This report was signed and finalized on 1/12/2023 12:28 PM by Angela Pablo MD.    XR Chest 1 View [[id number]] Collected: 01/12/23 1002     Updated: 01/12/23 1005    Narrative:      PROCEDURE: XR CHEST 1 VW-     HISTORY: illness, malaise     COMPARISON: 04/08/2022..     FINDINGS: The heart is mildly enlarged, stable. There is mild tortuosity  of the thoracic aorta with aortic mural calcifications.. The mediastinum  is unremarkable. Mild interstitial disease bilaterally is stable.. There  is no pneumothorax.  There are no acute osseous abnormalities.       Impression:      Stable chest..           This report was signed and finalized on 1/12/2023 10:03 AM by Angela Pablo MD.        aspirin, 81 mg, Oral, Daily  carvedilol, 25 mg, Oral, BID With Meals  [START ON 1/13/2023] cefTRIAXone, 1 g, Intravenous, Q24H  heparin (porcine), 5,000 Units, Subcutaneous, Q8H  losartan, 50 mg, Oral, Q24H  [START ON 1/13/2023] pantoprazole, 40 mg, Oral, Q AM  sodium chloride, 10 mL, Intravenous, Q12H  torsemide, 5 mg, Oral, Daily           Assessment/Plan:    1. Chronic kidney disease stage  IV: Baseline chronic kidney disease stage IV secondary to longstanding atherosclerotic and glomerulosclerosis.  Patient has refused to talk about dialysis, son has always agreed to what ever she says.  No indications for acute dialysis at this point.  But she may have some worsening of her baseline chronic kidney disease may end up requiring dialysis in the near future.  2. Anemia of chronic kidney disease: Longstanding history of need for IV iron as well as LIU, missed multiple doses in the recent past.  3. Hyperparathyroidism: Check and follow PTH.  Check phosphorus.  4. Acute cystitis without hematuria: Treated as per hospitalist service  5. Hypertension with chronic kidney disease stage IV: Blood pressure appears to be under optimal control with current medications.  6. Coronary artery disease: Significant atherosclerotic cardiovascular disease follows up with Dr. Acuña, she has missed her multiple recent appointments, last time she saw him was more than a year ago.  7. Valvular heart disease: Severe aortic sclerosis.  8. Inflammatory osteoarthritis: She used to be on Plaquenil which she decided not to continue.  Then she was started on prednisone, she was on 2.5 mg a day for some time with some relief and for acute episodes she will go up to 10 mg a day for 3 days.  She finally decided against it and stopped taking it.  9. Rheumatoid arthritis: Same is true as noted above  10. Debility: Patient recognizes the debility and wants to go to a rehab.  11. Left leg injury      Risk and complexity: High       Plan:  · Continue with gentle hydration for next 24 hours.  Continue IV antibiotics for the UTI.  · Once she is hydrated we will consider low-dose prednisone for her arthritis.  May use 10 mg a day for 3 days and then decrease it to 2.5 mg a day.  May have to stop her angiotensin receptor blocker.  · Continue with rest of the current treatment plan and surveillance labs.  · Details were discussed with the  patient no family in the room.  I will call patient's son Magno to discuss further plans.  · Details were also discussed with the hospitalist service.   · Further recommendations will depend on clinical course of the patient during the current hospitalization.    · I also discussed the details with the nursing staff.  · Rest as ordered.    In closing, I sincerely appreciate opportunity to participate in care of this patient. If I can be of any further assistance with the management of this patient, please don’t hesitate to contact me.    Clay Schilling MD, FASN    01/12/23  18:51 EST    Dictated using Dragon.             no

## 2023-03-03 NOTE — SWALLOW VFSS/MBS ASSESSMENT ADULT - BASELINE SWALLOW
[facility] - Intensive Care ([address] Medicine  Progress Note    Patient Name: Ibrahima Phelps  MRN: [id number]  Patient Class: IP- Inpatient   Admission Date: 2/20/2023  Length of Stay: 11 days  Attending Physician: Russ Vizcaino MD  Primary Care Provider: Connie Magdaleno MD        Subjective:     Principal Problem:Acute kidney injury superimposed on CKD        HPI:  66-year-old black male transplant kidney patient being admitted for KATINA.  Patient was just discharged from a hospitalization for COVID pneumonia on 02/10/2023.  He also paid in ED visit a few days ago for what could have been a mild cellulitis/folliculitis on his foot for which he is almost done with his Keflex regimen.    Patient was instructed to come to the ED this time by the COVID surveillance nurses due to desaturation down to 88%.  When I talked with the patient and his wife in detail, they affirmed that he was not having any new or worsening shortness of breath since his discharge.  His wife did report that once the patient woke up/sat up his desaturation spontaneously resolved.  Patient denies any steven current shortness of breath or chest pain.  No nausea vomiting or flu fever like feeling.  In my discussion with the ED provider who was noted that the patient was complaining of shortness of breath but there was a concern that this likely was anxiety triggered.  Patient seems to be saturating well on room air here in the ED and chest x-ray which I personally reviewed is clear bilaterally.  Wife reports patient has been compliant with his medications.    The blood work did demonstrate an acutely elevated creatinine of 3.8 whereas his baseline is in the 2s.      Overview/Hospital Course:  66-year-old renal transplant black male was admitted for acute on chronic kidney injury.  Patient was just discharged from hospitalization for COVID pneumonia on 02/10/2023.  Presented to the ED per recommendations of the COVID surveillance team.   Did not have any definitive acute respiratory findings to indicate acute ongoing infectious COVID, chest x-ray was clear.  However creatinine was significantly elevated up to 3.8 whereas baseline was in the 2s.  Was started on IV fluids with trivial improvement.  CT chest was obtained due to his persistent unchanged severe dyspnea which showed some small bilateral effusions, not enough to drain via thoracentesis.  Patient was started on diuresis with improvement in shortness of breath.  Creatinine seemed to plateau around 3.9.  Patient did get delirious and was started on Depakote.      Interval History:  Patient has been successfully urinating spontaneously but concern for an accurate output is there.  Decreased output charted compared to yesterday since the patient was transitioned from drip to IV b.i.d. Lasix.  Per the wife patient is more awake and alert today than yesterday since his Depakote was discontinued and was switched over to Zyprexa.  Patient self denies any steven shortness of breath chest pain.  Creatinine slightly climbed up to 4 today.    Review of Systems  Objective:     Vital Signs (Most Recent):  Temp: 98.6 °F (37 °C) (03/03/23 1133)  Pulse: 81 (03/03/23 1234)  Resp: 16 (03/03/23 1324)  BP: (!) 147/72 (03/03/23 1133)  SpO2: 96 % (03/03/23 1213)   Vital Signs (24h Range):  Temp:  [98.1 °F (36.7 °C)-99.1 °F (37.3 °C)] 98.6 °F (37 °C)  Pulse:  [74-87] 81  Resp:  [16-29] 16  SpO2:  [93 %-97 %] 96 %  BP: (128-164)/(70-74) 147/72     Weight: 110.5 kg (243 lb 9.7 oz)  Body mass index is 32.14 kg/m².    Intake/Output Summary (Last 24 hours) at 3/3/2023 1445  Last data filed at 3/3/2023 0622  Gross per 24 hour   Intake 480 ml   Output 550 ml   Net -70 ml      Physical Exam    Condom catheter in place   Clear lungs bilaterally, unlabored breathing, room air, no cyanosis   Heart sounds indicate a regular rate and rhythm   No obvious lower extremity edema   A bit drowsy but less so than yesterday  Following  "on motor commands  5/5 proximal upper and lower extremity strength bilaterally including fist  and hip flexor strength   No facial droop, no slurred speech   Pupils equal bilaterally   Oriented x3        Assessment/Plan:      67 y/o BF admitted for KATINA on CKD of  transplanted kidney    * Acute kidney injury superimposed on CKD  Baseline seems to be a creatinine in the 2s   Cr still elevated unchanged at 3.8 despite IVF. Scr increased to 4.0 --> 3.8  US no overly revealing; Transplant working up rejection  - maintain urinary output but creatinine seems to be plateauing now at 3.8-4.0.  Patient affirms he does not want dialysis.    -IV lasix BID per transplant nephro for another day          H/O kidney transplant  K transplant service following  Routine prograf levels  Continue home Prograf 2 mg b.i.d.  Continue home prednisone  - can restart on  BID at the time of discharge      Chronic dyspnea  Acute hypoxic respiratory failure  -Unchanged prior to admission; Don't suspect acute covid  - seems resolved; on RA now  -CT chest w/o contrast showing BL pleural effusions; unable to perform thoracentesis due to insufficient fluid for safe procedure   -improved w/ aggressive diuresis      Advance care planning  - patient expressed that he would not like to pursue dialysis if renal failure progressed   - Palliative care consulted. apprec recs      Palliative care encounter  - see "goals of care discussion and advance care planning"      Delirium  Much improved after being switched from Depakote to Zyprexa    Right ankle pain  -resolved w/ colchicine, likely was gout      Acute on chronic diastolic heart failure  - acute element resolved  - IV lasix push -- > lasix gtt on 2/27. Added IV diuril BID on 2/28 per nephro recs -- > back to IV lasix BID push now  - euvolemic; acute element resolved    Paroxysmal atrial fibrillation  Continue home Eliquis, metoprolol  - EP cardiology consulted, apprec recs  -- holding home " " Flecainide due to KATINA        CAD (coronary artery disease)  History of stenting years ago   Continue home aspirin and statin, Toprol      Hypertension  Continue home metoprolol, nifedipine, hydralazine      Long-term use of immunosuppressant medication  - see "kidney transplant"      VTE Risk Mitigation (From admission, onward)         Ordered     apixaban tablet 5 mg  2 times daily         02/28/23 1904     Place sequential compression device  Until discontinued         02/27/23 1212                Discharge Planning   RAMU: 3/3/2023     Code Status: Full Code   Is the patient medically ready for discharge?: No    Reason for patient still in hospital (select all that apply): Treatment  Discharge Plan A: Home with family   Discharge Delays: None known at this time              Russ Vizcaino MD  Department of Hospital Medicine   [facility] - Intensive Care ([place] [id number])    " Weak baseline swallow

## 2023-03-09 NOTE — SWALLOW BEDSIDE ASSESSMENT ADULT - COMMENTS
[facility]    Progress Note - Medicine Service, [facility] TEAM 1       Date of Admission:  3/6/2023    Assessment & Plan   Edson Thornton Jr. is a 55 year old male admitted on 3/6/2023. 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 is a 55 year old male admitted on 3/6/2023. 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 Patient has been improving on Remdesivr, has been breathing comfortably on room air with saturation >90%, regarding PE results are indeterminate, but not worried acutely about immediate recurrence or continued suspicion for current presentation being representative. Given patients improvement in symptoms, awaiting INR tomorrow 3/10 in the AM patient would be stable to be considered for discharge home.    Today  - INR subtherapeutic, recheck on 3/10 to determine if lovenox needed   - Continue high dose heparin  - Continue warfarin    #COVID Pneumonia  #Acute Hypoxic Respiratory Failure  # Possible Aspergillus Infection  Patient has been breathing comfortably on room air with O2 saturation >92%. Endorses shortness of breath on exertion with light crackles and wheezing present bilaterally. Patient denies any shortness of breath on exertion, has been afebrile. Per ID recommendation will continue remdesivir 100mg for 5 days as patient showed improvement initially, discontinue dexamethasone and stop antibiotics as this is most likely viral with a low likely yang of bacterial or fungal infection. Sputum cultured has been  ordered and needs to be repeated. Per Transplant Pulmonary recommendations they don't believe this is a superimpose bacterial infection given patient has unremarkable procalcitonin and no evidence of other physical exam findings, recommended starting xopenex for wheezing. Patients sputum cultures came back positive for septate hyphae and growing yeast concerning for aspergillus infection. ID is following and will appreciate recommendations  - Sputumm culture positive for septate hyphae; appreciate ID recommendations  - Xopenex neb TID  - Remdesivir 100mg IV for 5 days  - PTA Norvasc 10mg daily  - Hold myfortic (held since 3/5)  - Completed IVIG 3/8    #Apical Pulmonary Embolism  CTA obtained at outside hospital (3/2023) showed 2 small filling defects in the right apical pulmonary artery,suspicious for subtle acute pulmonary emboli. Patient was taking coumadin with INR in therapeutic window. Heme was consulted and is not convinced that this is an acute pulmonary embolism and recommended restarying PTA warfarin. Radiology consulted and due to imaging is unable to determine indefinitely if patient had a PE or did not have a PE. Given patients improvement with remdesivir, no signs of recurrence or increased shortness of breath, d-dimer has been unremarkable since admission, and patient was on therapeutic warfarin at the time, making the possiblity of a PE has a low.  - Coumadin 1mg x1 daily  - Continue high dose heparin  - Consider Lovenox tomorrow if INR <1.8    #Lung Transplant (10/2021) 2/2 NISP   -pulmonary transplant consulted   -will continue PTA tacrolimus and prednisone and hold PTA MMF  -PTA Prednisone 5 in AM and 2.5 in PM  -AM tacro level pending on 3/10  -PTA Tacrolimus 2 mg in AM and 5 in PM  -PTA Bactrim for PJP ppx     #Chest Pressure   Patient endorsed new onset of chest pain that started this AM (3/9), was not described as painful but noticeable pressure that is reporducible on palpation.Given patients  "recent concern for PE, but no other red flag signs we will monitor this for angina, effusion, with most likely cause being musculoskeletal related from deconditioning.  - Monitor with no immediate intervention indicated at this time    chronic conditions~      #HTN: Restarted PTA Norvasc 10mg   #Hypothyroidusm: PTA levo  #GERD: PTA Pantoprazole   #Insomnia: PTA Melatonin   #Steroid induced hyperglycemia: sliding scale insulin  #BPH: PTA Tamsulosin    #Mood Disorder: PTA Fluoxetine, PTA Mirtazapine, PTA Lamotrigine       - COVID-19 special precautions, continuous pulse-ox  - Oxygen: continue current support with O2 Device: None (Room air) at FiO2 (%): 21 %; titrate to keep SpO2 between 90-96%  - Labs: Standard COVID admission labs ordered (CBC with diff, CMP, INR, D-dimer, CRP).   - Imaging: no additional imaging needed at this time  - DVT Prophylaxis: heparin gtt         - At high risk of thrombotic complications due to COVID-19 (DDimer = <0.27 ug/mL FEU (Ref range: 0.00 - 0.50 ug/mL FEU) )         - Already on therapeutic anticoagulation with heparin gtt      Diet: Combination Diet Regular Diet Adult  Snacks/Supplements Adult: Other; If pt asks for a snack or supplement, please send; With Meals    DVT Prophylaxis: heparin gtt  Watson Catheter: Not present  Fluids: none  Lines: None     Cardiac Monitoring: None  Code Status: Full Code      Clinically Significant Risk Factors            # Hypomagnesemia: Lowest Mg = 1.1 mg/dL in last 2 days, will replace as needed   # Hypoalbuminemia: Lowest albumin = 3.4 g/dL at 3/6/2023  6:43 PM, will monitor as appropriate   # Thrombocytopenia: Lowest platelets = 127 in last 2 days, will monitor for bleeding         # Overweight: Estimated body mass index is 26.5 kg/m  as calculated from the following:    Height as of this encounter: 1.626 m (5' 4\").    Weight as of this encounter: 70 kg (154 lb 6.4 oz)., PRESENT ON ADMISSION         Disposition Plan      Expected Discharge Date: " 03/10/2023        Discharge Comments: Would need a ride to home in [place]        The patient's care was discussed with the Attending Physician, Dr. Lobito Cheatham, Chief Resident/Fellow and Patient.    Allen Pisano  Medical Student  Medicine Service, [facility] TEAM 1  [facility]  Securely message with [employer] (more info)  Text page via [facility] Paging/Directory   See signed in provider for up to date coverage information     Resident/Fellow Attestation   I, Lanie Abdalla MD, was present with the medical/PRINCESS student who participated in the service and in the documentation of the note.  I have verified the history and personally performed the physical exam and medical decision making.  I agree with the assessment and plan of care as documented in the note.      Lanie Abdalla MD  PGY1  Date of Service (when I saw the patient): 03/09/23  ______________________________________________________________________    Interval History     Patient had complaints of chest pain, that was described as a 2/10, pin point with no radiation, that is reproducible with palpation. This started this morning (3/09/23), has not noticed any difficulty breathing or increased pressure in chest. No change in sensation, loss of color, or confusion.    Physical Exam   Vital Signs: Temp: 98.5  F (36.9  C) Temp src: Oral BP: 133/89 Pulse: 98   Resp: 18 SpO2: 94 % O2 Device: None (Room air)    Weight: 154 lbs 6.4 oz    Constitutional: awake, alert, cooperative, no apparent distress, and appears stated age  Eyes: Lids and lashes normal, pupils equal, round and reactive to light, extra ocular muscles intact, sclera clear, conjunctiva normal  Respiratory: no increased work of breathing, mild air exchange, no retractions and crackles right base and left base  Cardiovascular: Normal apical impulse, regular rate and rhythm, normal S1 and S2, no S3 or S4, and no murmur noted  GI: scars noted epigastric and  normal bowel sounds  Skin: 2-3 scars present across midline bilaterally, no rashes and no lesions    Medical Decision Making       Data     I have personally reviewed the following data over the past 24 hrs:    5.3  \   10.9 (L)   / 127 (L)     139 108 (H) 16.5 /  191 (H)   3.6 19 (L) 0.93 \       Procal: N/A CRP: 26.30 (H) Lactic Acid: N/A       INR:  1.79 (H) PTT:  N/A   D-dimer:  <0.27 Fibrinogen:  N/A       Imaging results reviewed over the past 24 hrs:   Recent Results (from the past 24 hour(s))   US Lower Extremity Venous Duplex Bilateral    Narrative    EXAMINATION: US LOWER EXTREMITY VENOUS DUPLEX BILATERAL  3/8/2023 5:26  PM      CLINICAL HISTORY: PE    COMPARISON: None        PROCEDURE COMMENTS: Ultrasound was performed of the deep venous system  of the right and left lower extremity using grayscale, color, and  spectral Doppler.    FINDINGS:  The common femoral, greater saphenous origin, femoral, popliteal, and  deep calf veins are visualized and are patent. Venous waveforms are  normal. There is normal response to compression.      Impression    IMPRESSION:.  No deep vein thrombosis in the right or left lower extremity.    I have personally reviewed the examination and initial interpretation  and I agree with the findings.    MAURICIO NAVAS MD         SYSTEM ID:  [id number]      Cheeks Filler Volume In Cc: 2 Pt elevated to 90° by SLP. AA+O x2. (+) impaired vision, fatigued, NBR, baseline cough, reported acid reflux.

## 2023-04-22 NOTE — ED PROVIDER NOTE - WET READ LAUNCH FT
There are no Wet Read(s) to document.
Rx: MVI and vitamin C 500mg daily. Monitor tube feed tolerance. Obtain daily weights to monitor trends.

## 2023-08-07 NOTE — PROGRESS NOTE ADULT - SUBJECTIVE AND OBJECTIVE BOX
`Patient is a 76y old  Male who presents with a chief complaint of R Foot Pain (20 Oct 2021 18:12)    PATIENT IS SEEN AND EXAMINED IN MEDICAL FLOOR.  KEENANT [    ]    MADDY [   ]      GT [   ]    ALLERGIES:  No Known Allergies      Daily     Daily     VITALS:    Vital Signs Last 24 Hrs  T(C): 36.4 (21 Oct 2021 04:45), Max: 36.5 (20 Oct 2021 20:27)  T(F): 97.5 (21 Oct 2021 04:45), Max: 97.7 (20 Oct 2021 20:27)  HR: 97 (21 Oct 2021 04:45) (77 - 97)  BP: 112/69 (21 Oct 2021 04:45) (112/69 - 144/72)  BP(mean): --  RR: 18 (21 Oct 2021 04:45) (18 - 18)  SpO2: 100% (21 Oct 2021 04:45) (100% - 100%)    LABS:    CBC Full  -  ( 21 Oct 2021 04:56 )  WBC Count : 4.77 K/uL  RBC Count : 3.24 M/uL  Hemoglobin : 9.6 g/dL  Hematocrit : 29.6 %  Platelet Count - Automated : 276 K/uL  Mean Cell Volume : 91.4 fl  Mean Cell Hemoglobin : 29.6 pg  Mean Cell Hemoglobin Concentration : 32.4 gm/dL  Auto Neutrophil # : x  Auto Lymphocyte # : x  Auto Monocyte # : x  Auto Eosinophil # : x  Auto Basophil # : x  Auto Neutrophil % : x  Auto Lymphocyte % : x  Auto Monocyte % : x  Auto Eosinophil % : x  Auto Basophil % : x      10-21    142  |  109<H>  |  11  ----------------------------<  106<H>  3.8   |  28  |  0.84    Ca    9.0      21 Oct 2021 04:56    TPro  6.8  /  Alb  2.4<L>  /  TBili  0.7  /  DBili  x   /  AST  31  /  ALT  50  /  AlkPhos  130<H>  10-21    CAPILLARY BLOOD GLUCOSE      POCT Blood Glucose.: 118 mg/dL (21 Oct 2021 07:04)  POCT Blood Glucose.: 110 mg/dL (21 Oct 2021 00:10)  POCT Blood Glucose.: 118 mg/dL (20 Oct 2021 21:07)  POCT Blood Glucose.: 126 mg/dL (20 Oct 2021 16:31)  POCT Blood Glucose.: 93 mg/dL (20 Oct 2021 11:51)        LIVER FUNCTIONS - ( 21 Oct 2021 04:56 )  Alb: 2.4 g/dL / Pro: 6.8 g/dL / ALK PHOS: 130 U/L / ALT: 50 U/L DA / AST: 31 U/L / GGT: x           Creatinine Trend: 0.84<--, 1.00<--, 0.97<--, 0.97<--, 1.05<--, 0.97<--  I&O's Summary    20 Oct 2021 07:01  -  21 Oct 2021 07:00  --------------------------------------------------------  IN: 0 mL / OUT: 1600 mL / NET: -1600 mL            .Body Fluid Pleural Fluid  10-08 @ 18:51   No growth at 1 week.  --  --      .Body Fluid Pleural Fluid  10-08 @ 18:34   No growth at 5 days  --    polymorphonuclear leukocytes seen  No organisms seen  by cytocentrifuge      .Tissue Right 5th toe r/o osteomyeltis  09-22 @ 13:54   No growth at 5 days  --    No polymorphonuclear cells seen per low power field  No organisms seen per oil power field      .Blood Blood-Venous  09-17 @ 10:28   No Growth Final  --  --      .Surgical Swab right foot wound  09-16 @ 21:42   Culture yields >4 types of aerobic and/or anaerobic bacteria  Call client services within 7 days if further workup is clinically  indicated. Culture includes  Rare Aeromonas hydrophila/caviae  Few Klebsiella oxytoca/Raoutella ornithinolytica  Few Enterococcus faecalis  Few Streptococcus agalactiae (Group B) isolated  Group B streptococci are susceptible to ampicillin,  penicillin and cefazolin, but may be resistant to  erythromycin and clindamycin.  Recommendations for intrapartum prophylaxis for Group B  streptococci are penicillin or ampicillin.  --  Aeromonas hydrophila/caviae  Klebsiella oxytoca /Raoutella ornithinolytica  Enterococcus faecalis      Bone R 5th metatarsal bone clean ma  08-20 @ 03:59   No growth at 5 days  --    No polymorphonuclear leukocytes seen per low power field  No organisms seen per oil power field      .Blood Blood-Venous  08-14 @ 21:09   No Growth Final  --  --      .Surgical Swab Right foot plantar wound  08-14 @ 21:08   Moderate Streptococcus agalactiae (Group B) isolated  Group B streptococci are susceptible to ampicillin,  penicillin and cefazolin, but may be resistant to  erythromycin and clindamycin.  Recommendations for intrapartum prophylaxis for Group B  streptococci are penicillin or ampicillin.  Moderate Bacteroides fragilis "Susceptibilities not performed"  Normal skin filiberto isolated  --  --          MEDICATIONS:    MEDICATIONS  (STANDING):  apixaban 5 milliGRAM(s) Oral two times a day  aspirin  chewable 81 milliGRAM(s) Oral daily  atorvastatin 80 milliGRAM(s) Oral at bedtime  chlorhexidine 2% Cloths 1 Application(s) Topical <User Schedule>  collagenase Ointment 1 Application(s) Topical daily  cyanocobalamin 1000 MICROGram(s) Oral daily  ergocalciferol 42670 Unit(s) Oral <User Schedule>  ferrous    sulfate Liquid 300 milliGRAM(s) Enteral Tube daily  finasteride 5 milliGRAM(s) Oral daily  gabapentin 100 milliGRAM(s) Oral three times a day  insulin lispro (ADMELOG) corrective regimen sliding scale   SubCutaneous every 6 hours  metoprolol tartrate 25 milliGRAM(s) Oral two times a day  NIFEdipine XL 60 milliGRAM(s) Oral daily  pantoprazole  Injectable 40 milliGRAM(s) IV Push at bedtime  piperacillin/tazobactam IVPB.. 3.375 Gram(s) IV Intermittent every 8 hours      MEDICATIONS  (PRN):  acetaminophen   Tablet .. 650 milliGRAM(s) Oral every 6 hours PRN Temp greater or equal to 38C (100.4F), Moderate Pain (4 - 6)  ondansetron Injectable 4 milliGRAM(s) IV Push three times a day PRN Nausea and/or Vomiting  sodium chloride 0.9% lock flush 10 milliLiter(s) IV Push every 1 hour PRN Pre/post blood products, medications, blood draw, and to maintain line patency      REVIEW OF SYSTEMS:                           ALL ROS DONE [ X   ]    CONSTITUTIONAL:  LETHARGIC [   ], FEVER [   ], UNRESPONSIVE [   ]  CVS:  CP  [   ], SOB, [   ], PALPITATIONS [   ], DIZZYNESS [   ]  RS: COUGH [   ], SPUTUM [   ]  GI: ABDOMINAL PAIN [   ], NAUSEA [   ], VOMITINGS [   ], DIARRHEA [   ], CONSTIPATION [   ]  :  DYSURIA [   ], NOCTURIA [   ], INCREASED FREQUENCY [   ], DRIBLING [   ],  SKELETAL: PAINFUL JOINTS [   ], SWOLLEN JOINTS [   ], NECK ACHE [   ], LOW BACK ACHE [   ],  SKIN : ULCERS [   ], RASH [   ], ITCHING [   ]  CNS: HEAD ACHE [   ], DOUBLE VISION [   ], BLURRED VISION [   ], AMS / CONFUSION [   ], SEIZURES [   ], WEAKNESS [   ],TINGLING / NUMBNESS [   ]      PHYSICAL EXAMINATION:    GENERAL APPEARANCE: NO DISTRESS  HEENT:  NO PALLOR, NO  JVD,  NO   NODES, NECK SUPPLE  CVS: S1 +, S2 +,   RS: AEEB,  OCCASIONAL  RALES +,  RONCHI +, CRACKLES + [IMPROVING]   ABD: SOFT, NT, NO, BS +       EXT: NO PE  SKIN: WARM,   RIGHT FOOT WRAPPED  SKELETAL:  ROM ACCEPTABLE  CNS:  AAO X  2-3        RADIOLOGY :    < from: Transthoracic Echocardiogram (10.07.21 @ 15:26) >  CONCLUSIONS:  1. Normal mitral valve. Moderate mitral regurgitation.  2. Calcified aortic valve with decreased opening, cannot  exclude bicuspid. Peak transaortic valve gradient equals  32.4 mm Hg, mean transaortic valve gradient equals 19 mm  Hg, dimensionless index 0.22, estimated aortic valve area  equals 0.6sqcm (by continuity equation), consistent with  paradoxical low-flow low-gradient severe aortic stenosis.  Consider dobutamine stress echocardiogram and/or SABIHA for  further evaluation.  No aortic valve regurgitation seen.  3. Normal aortic root.  4. Normal left atrium.  5. Moderate concentric left ventricular hypertrophy.  6. Moderate global left ventricular systolic dysfunction  (EF 40-45% by visual estimation).  7. Grade II diastolic dysfunction (moderate).  8. Normal right atrium.  9. Normal right ventricular size with decreased RV systolic  function (TAPSE 1.2 cm).  10. RV systolic pressure is borderline normal at  34 mm Hg.  11. Tricuspid valve not well seen. Trace tricuspid  regurgitation.  12. Normal pulmonic valve. Trace pulmonic insufficiency is  noted.  13. No pericardial effusion.  14. Bilateral pleural effusions.    < end of copied text >      EXAM:  CT ABDOMEN AND PELVIS OC                            PROCEDURE DATE:  09/27/2021          INTERPRETATION:  CLINICAL INFORMATION: Small bowel obstruction.    COMPARISON: None.    CONTRAST/COMPLICATIONS:  IV Contrast: NONE  Oral Contrast: Omnipaque 300  Complications: None reported at time of study completion    PROCEDURE:  CT of the Abdomen and Pelvis was performed.  Sagittal and coronal reformats were performed.    FINDINGS:  LOWER CHEST: Small bilateral pleural effusions with compressiveatelectasis of both lower lobes.    LIVER: Within normal limits.  BILE DUCTS: Normal caliber.  GALLBLADDER: Distended. Small layering gallstones.  SPLEEN: Within normal limits.  PANCREAS: Within normal limits.  ADRENALS: Within normal limits.  KIDNEYS/URETERS: No hydronephrosis. Nonobstructing left upper pole calculus, measuring 0.6 cm.    BLADDER: Urinary bladder contains air and a Castañeda catheter balloon.  REPRODUCTIVE ORGANS: Prostate is not enlarged.    BOWEL: No bowel obstruction. Appendix isnormal. Colonic diverticulosis.  PERITONEUM: Mild presacral fluid.  VESSELS: Atherosclerotic changes.  RETROPERITONEUM/LYMPH NODES: No lymphadenopathy.  ABDOMINAL WALL: Within normal limits.  BONES: Degenerative changes. Sternotomy.    IMPRESSION:  No acute intra-abdominal pathology.    Small bilateral pleural effusions with compressive atelectasis of both lower lobes.    ====================================================    EXAM:  MR FOOT RT                            PROCEDURE DATE:  09/17/2021          INTERPRETATION:  Clinical Information: Recent fifth metatarsal head resection now with fifth digit pain, swelling and foul discharge.    Comparison: Radiographs of the right foot from 9/16/2021 and MRI the right foot from 8/16/2021.    Technique:  MRI of the right midfoot and forefoot.  Intravenous Contrast: None.    Findings:    There is a resection at the mid aspect of the fifth metatarsal shaft. There is a lateral soft tissue wound beginning at the level of the amputation which extends distally. There is susceptibility artifact in the region of the amputation consistent with postoperative change. There is hyperintense T2 marrow signal throughout the remaining fifth metatarsal and within the adjacent fourth metatarsal head which is nonspecific and could be related to recent postoperative changes although osteomyelitis is suspected.    There is edema and mild atrophy within the plantar muscles of the foot. There is minimal spurring at the first metatarsophalangeal and hallux sesamoid articulations.    Impression:  Resection at the mid aspect of the fifth metatarsal. Lateral soft tissue wound with marrow signal abnormality throughout the fifth metatarsal and within the adjacent fourth metatarsal head which is nonspecific in the setting of recent surgery although osteomyelitis is suspected.        ASSESSMENT :     Headache    HTN (hypertension)    HLD (hyperlipidemia)    DM (diabetes mellitus)    CAD (coronary artery disease)    Glaucoma, angle-closure    [place] (hard of hearing)      S/P CABG (coronary artery bypass graft)    History of thyroid surgery        PLAN:    HPI:  76M from home, lives with daughter w/ PMH DM on insulin, HTN, HLD, CAD, 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 Pt with sharp pain on the R lateral foot. As per daughter, pt was taking tylenol which did not help his pain prompting the patient to ask his daughter to take him to the hospital. Pt is a poor historian. Daughter states that the patient did not see his podiatrist after the surgery. No fever, chest, pain, palpitations, nausea, vomiting, diarrhea (17 Sep 2021 01:11)      # PATIENT IS S/P ICU MONITORING AND RIGHT CHEST TUBE REMOVAL ON 10/15/2021     # COVID EXPOSURE ON 10/13 - ON CONTACT AND DROPLET ISOLATION PRECAUTION; F/U COVID PCR    # SUSPECT RIGHT FOOT OSTEOMYELITIS S/P RIGHT 5TH RAY RESECTION [8/19] AND S/P DEBRIDEMENT, GRAFT APPLICATION, BONE BX AND WOUND VAC APPLICATION 9/22 - BONE BX W/ ACUTE OSTEOMYELITIS AND NECROTIC PERIOSTEAL TISSUE  ** RECENT ADMISSION FOR RIGHT DIABETIC FOOT ULCER, CELLULITIS, OSTEOMYELITIS RIGHT 5th METATARSAL S/P RIGHT 5TH PARTIAL RAY AMPUTATION [8/20] - BONE PATHOLOGY W/ CLEAN MARGINS    - S/P VANCOMYCIN AND ZOSYN ; NOW ON UNASYN AND LEVAQUIN GIVEN OREN; PER ID SWITCH TO ZOSYN UNTIL 11/3 [NOW ON MEROPENEM]  - RECENTLY HAD SIMON W/ MILD PAD  - REVIEWED WOUND CX [ ENTEROCOCCUS, AEROMONAS, KLEBSIELLA] AND BCX  - ID CONSULT, PODIATRY CONSULT    - PICC LINE PLACED TO COMPLETE 6 WEEKS OF ANTIBIOTICS - ON MEROPENEM UNTIL 11/3 ON D/C    # ACUTE HYPOXIC RESPIRATORY FAILURE DUE TO ACUTE ON CHRONIC SYSTOLIC CHF  (  LV EF 40- 45 % ) , DIASTOLIC LV DYSFUNCTION , ,  SEVERE AORTIC STENOSIS & MODERATE MITRAL REGURGITATION  &  ASPIRATION PNA;  - S/P CHEST TUBE INSERTION AND REMOVAL  , S/P LASIX ,   CARDIOLOGY CONSULT IN PROGRESS      - CHEST TUBE PLACED [10/8] - FLUID CONSISTENT WITH TRANSUDATIVE PROCESS  - S/P EXTUBATION 10/13  - S/P CHEST TUBE REMOVAL 10/15      # SEPTIC SHOCK - ? S/T HYPOVOLEMIA VS. SEPSIS - S/P CVC [SWITCHED FROM FEMORAL TO LEFT IJ], S/P VASOPRESSORS - RESOLVING  - BCX - NGTD  - ON MEROPENEM      # TRANSIENT NEW ONSET A.FIB, PAROXYSMAL - ON ELIQUIS, CARDIOLOGY CONSULT IN PROGRESS    # FUNGURIA - D/C FLUCONAZOLE GIVEN TRANSAMINITIS    # TRANSAMINITIS - SUSPECT THIS IS ISCHEMIC HEPATITIS - IMPROVING - HEPATOLOGY CONSULT IN PROGRESS    # BOWEL DISTENTION - ? ILEUS - OPTIMIZING ELECTROLYTES - IMPROVED  - SURGERY CONSULT IN PROGRESS    # CHOLELITHIASIS - TRENDING LFTS, RUQ U/S - CHOLELITHIASIS, SURGERY CONSULT IN PROGRESS  - GI CONSULT IN PROGRESS - LESS SUSPICIOUS FOR CHOLECYSTITIS    # DYSPEPSIA - PLACED ON PPI BID WITH IMPROVEMENT, UNDERWENT ST EVAL     # ELEVATED TROPONINS - NSTEMI - ? DEMAND - WILL NEED ISCHEMIC EVAL ONCE ACUTE INFECTION RESOLVES PER CARDIOLOGY, CARDIOLOGY CONSULT IN PROGRESS  - ON ASA, STATIN, BB    # OREN ON CKD - S/T ATN AND URINARY RETENTION - S/P IVF, NOW W/ CASTAÑEDA, NEPHROLOGY CONUSLT IN PROGRESS  -  BPH ON FLOMAX, AND FINASTERIDE  - FAILED TOV WITH WORSENING RENAL FXN - NOTED URINARY RETENTION [10/4] - REPLACED CASTAÑEDA  - TOV 10/18 - BLADDER SCAN BID TO EVALUATE FOR URINARY RETENTION - FAILED TOV  - OVERNIGHT 10/18 - CASTAÑEDA REPLACED    # MEDICAL CLEARANCE FOR SURGERY - RCRI - 2 - 10.1 % 30 DAY RISK OF DEATH, MI OR CARDIAC ARREST  - CARDIOLOGY CONSULT     # DM -  HBA1C - 11  - LANTUS, SSI + FS    # CAD S/P CABG - PLACED ON ASA, STATIN, BB  - ECHO - SEVERE AORTIC STENOSIS, SEVERE CONCENTRIC LVH, G1DD, MILD OH  - CARDIOLOGY CONSULT - DR. BASSETT   - MAY NEED ISCHEMIC EVAL ONCE ACUTE ILLNESS RESOLVES INCLUDING CARDIAC CATHETERIZATION    # HTN, HLD  - ON METOPROLOL, NIFEDIPINE, STATIN       #  IMPAIRED GAIT DUE TO CERVICAL & LS SPONDYLOSIS, POLY ARTHRITIS AND DIABETIC PERIPHERAL NEUROPATHY, OP VITAMIN D DEFICIENCY - ON CHOLECALCIFEROL, OBTAIN PT & OT   #  FAILURE TO THRIVE , MODERATE PROTEIN CALORIE MALNUTRITION       # CASE DISCUSSED AT LENGTH WITH PATIENT AND DAUGHTER, BIRDIE YESICA AT BEDSIDE AND VIA PHONE @ [phone] [10/5] - CASE DICUSSED AT LENGTH AND ALL QUESTIONS WERE ANSWERED. DAUGHTER UNDERSTOOD THAT PROGNOSIS IS GUARDED.  # PATIENT AND DAUGHTER REFUSED [facility] ON PREVIOUS ADMISSION, PREVIOUSLY WISHED FOR PATIENT RETURN HOME W/ HOME PT; HOWEVER NOW, DAUGHTER WISHES FOR PATIENT TO GO TO [facility], AS PATIENT'S WIFE IS CURRENTLY ADMITTED THERE    # GI AND DVT PPX   `Patient is a 76y old  Male who presents with a chief complaint of R Foot Pain (20 Oct 2021 18:12)    PATIENT IS SEEN AND EXAMINED IN MEDICAL FLOOR.      ALLERGIES:  No Known Allergies      VITALS:    Vital Signs Last 24 Hrs  T(C): 36.4 (21 Oct 2021 04:45), Max: 36.5 (20 Oct 2021 20:27)  T(F): 97.5 (21 Oct 2021 04:45), Max: 97.7 (20 Oct 2021 20:27)  HR: 97 (21 Oct 2021 04:45) (77 - 97)  BP: 112/69 (21 Oct 2021 04:45) (112/69 - 144/72)  BP(mean): --  RR: 18 (21 Oct 2021 04:45) (18 - 18)  SpO2: 100% (21 Oct 2021 04:45) (100% - 100%)    LABS:    CBC Full  -  ( 21 Oct 2021 04:56 )  WBC Count : 4.77 K/uL  RBC Count : 3.24 M/uL  Hemoglobin : 9.6 g/dL  Hematocrit : 29.6 %  Platelet Count - Automated : 276 K/uL  Mean Cell Volume : 91.4 fl  Mean Cell Hemoglobin : 29.6 pg  Mean Cell Hemoglobin Concentration : 32.4 gm/dL  Auto Neutrophil # : x  Auto Lymphocyte # : x  Auto Monocyte # : x  Auto Eosinophil # : x  Auto Basophil # : x  Auto Neutrophil % : x  Auto Lymphocyte % : x  Auto Monocyte % : x  Auto Eosinophil % : x  Auto Basophil % : x      10-21    142  |  109<H>  |  11  ----------------------------<  106<H>  3.8   |  28  |  0.84    Ca    9.0      21 Oct 2021 04:56    TPro  6.8  /  Alb  2.4<L>  /  TBili  0.7  /  DBili  x   /  AST  31  /  ALT  50  /  AlkPhos  130<H>  10-21    CAPILLARY BLOOD GLUCOSE      POCT Blood Glucose.: 118 mg/dL (21 Oct 2021 07:04)  POCT Blood Glucose.: 110 mg/dL (21 Oct 2021 00:10)  POCT Blood Glucose.: 118 mg/dL (20 Oct 2021 21:07)  POCT Blood Glucose.: 126 mg/dL (20 Oct 2021 16:31)  POCT Blood Glucose.: 93 mg/dL (20 Oct 2021 11:51)        LIVER FUNCTIONS - ( 21 Oct 2021 04:56 )  Alb: 2.4 g/dL / Pro: 6.8 g/dL / ALK PHOS: 130 U/L / ALT: 50 U/L DA / AST: 31 U/L / GGT: x           Creatinine Trend: 0.84<--, 1.00<--, 0.97<--, 0.97<--, 1.05<--, 0.97<--  I&O's Summary    20 Oct 2021 07:01  -  21 Oct 2021 07:00  --------------------------------------------------------  IN: 0 mL / OUT: 1600 mL / NET: -1600 mL            .Body Fluid Pleural Fluid  10-08 @ 18:51   No growth at 1 week.  --  --      .Body Fluid Pleural Fluid  10-08 @ 18:34   No growth at 5 days  --    polymorphonuclear leukocytes seen  No organisms seen  by cytocentrifuge      .Tissue Right 5th toe r/o osteomyeltis  09-22 @ 13:54   No growth at 5 days  --    No polymorphonuclear cells seen per low power field  No organisms seen per oil power field      .Blood Blood-Venous  09-17 @ 10:28   No Growth Final  --  --      .Surgical Swab right foot wound  09-16 @ 21:42   Culture yields >4 types of aerobic and/or anaerobic bacteria  Call client services within 7 days if further workup is clinically  indicated. Culture includes  Rare Aeromonas hydrophila/caviae  Few Klebsiella oxytoca/Raoutella ornithinolytica  Few Enterococcus faecalis  Few Streptococcus agalactiae (Group B) isolated  Group B streptococci are susceptible to ampicillin,  penicillin and cefazolin, but may be resistant to  erythromycin and clindamycin.  Recommendations for intrapartum prophylaxis for Group B  streptococci are penicillin or ampicillin.  --  Aeromonas hydrophila/caviae  Klebsiella oxytoca /Raoutella ornithinolytica  Enterococcus faecalis      Bone R 5th metatarsal bone clean ma  08-20 @ 03:59   No growth at 5 days  --    No polymorphonuclear leukocytes seen per low power field  No organisms seen per oil power field      .Blood Blood-Venous  08-14 @ 21:09   No Growth Final  --  --      .Surgical Swab Right foot plantar wound  08-14 @ 21:08   Moderate Streptococcus agalactiae (Group B) isolated  Group B streptococci are susceptible to ampicillin,  penicillin and cefazolin, but may be resistant to  erythromycin and clindamycin.  Recommendations for intrapartum prophylaxis for Group B  streptococci are penicillin or ampicillin.  Moderate Bacteroides fragilis "Susceptibilities not performed"  Normal skin filiberto isolated  --  --          MEDICATIONS:    MEDICATIONS  (STANDING):  apixaban 5 milliGRAM(s) Oral two times a day  aspirin  chewable 81 milliGRAM(s) Oral daily  atorvastatin 80 milliGRAM(s) Oral at bedtime  chlorhexidine 2% Cloths 1 Application(s) Topical <User Schedule>  collagenase Ointment 1 Application(s) Topical daily  cyanocobalamin 1000 MICROGram(s) Oral daily  ergocalciferol 84275 Unit(s) Oral <User Schedule>  ferrous    sulfate Liquid 300 milliGRAM(s) Enteral Tube daily  finasteride 5 milliGRAM(s) Oral daily  gabapentin 100 milliGRAM(s) Oral three times a day  insulin lispro (ADMELOG) corrective regimen sliding scale   SubCutaneous every 6 hours  metoprolol tartrate 25 milliGRAM(s) Oral two times a day  NIFEdipine XL 60 milliGRAM(s) Oral daily  pantoprazole  Injectable 40 milliGRAM(s) IV Push at bedtime  piperacillin/tazobactam IVPB.. 3.375 Gram(s) IV Intermittent every 8 hours      MEDICATIONS  (PRN):  acetaminophen   Tablet .. 650 milliGRAM(s) Oral every 6 hours PRN Temp greater or equal to 38C (100.4F), Moderate Pain (4 - 6)  ondansetron Injectable 4 milliGRAM(s) IV Push three times a day PRN Nausea and/or Vomiting  sodium chloride 0.9% lock flush 10 milliLiter(s) IV Push every 1 hour PRN Pre/post blood products, medications, blood draw, and to maintain line patency      REVIEW OF SYSTEMS:                           ALL ROS DONE [ X   ]    CONSTITUTIONAL:  LETHARGIC [   ], FEVER [   ], UNRESPONSIVE [   ]  CVS:  CP  [   ], SOB, [   ], PALPITATIONS [   ], DIZZYNESS [   ]  RS: COUGH [   ], SPUTUM [   ]  GI: ABDOMINAL PAIN [   ], NAUSEA [   ], VOMITINGS [   ], DIARRHEA [   ], CONSTIPATION [   ]  :  DYSURIA [   ], NOCTURIA [   ], INCREASED FREQUENCY [   ], DRIBLING [   ],  SKELETAL: PAINFUL JOINTS [   ], SWOLLEN JOINTS [   ], NECK ACHE [   ], LOW BACK ACHE [   ],  SKIN : ULCERS [   ], RASH [   ], ITCHING [   ]  CNS: HEAD ACHE [   ], DOUBLE VISION [   ], BLURRED VISION [   ], AMS / CONFUSION [   ], SEIZURES [   ], WEAKNESS [   ],TINGLING / NUMBNESS [   ]      PHYSICAL EXAMINATION:    GENERAL APPEARANCE: NO DISTRESS  HEENT:  NO PALLOR, NO  JVD,  NO   NODES, NECK SUPPLE  CVS: S1 +, S2 +,   RS: AEEB,  OCCASIONAL  RALES +,  RONCHI +, CRACKLES + [IMPROVING]   ABD: SOFT, NT, NO, BS +       EXT: NO PE  SKIN: WARM,   RIGHT FOOT WRAPPED  SKELETAL:  ROM ACCEPTABLE  CNS:  AAO X  2-3        RADIOLOGY :    < from: Transthoracic Echocardiogram (10.07.21 @ 15:26) >  CONCLUSIONS:  1. Normal mitral valve. Moderate mitral regurgitation.  2. Calcified aortic valve with decreased opening, cannot  exclude bicuspid. Peak transaortic valve gradient equals  32.4 mm Hg, mean transaortic valve gradient equals 19 mm  Hg, dimensionless index 0.22, estimated aortic valve area  equals 0.6sqcm (by continuity equation), consistent with  paradoxical low-flow low-gradient severe aortic stenosis.  Consider dobutamine stress echocardiogram and/or SABIHA for  further evaluation.  No aortic valve regurgitation seen.  3. Normal aortic root.  4. Normal left atrium.  5. Moderate concentric left ventricular hypertrophy.  6. Moderate global left ventricular systolic dysfunction  (EF 40-45% by visual estimation).  7. Grade II diastolic dysfunction (moderate).  8. Normal right atrium.  9. Normal right ventricular size with decreased RV systolic  function (TAPSE 1.2 cm).  10. RV systolic pressure is borderline normal at  34 mm Hg.  11. Tricuspid valve not well seen. Trace tricuspid  regurgitation.  12. Normal pulmonic valve. Trace pulmonic insufficiency is  noted.  13. No pericardial effusion.  14. Bilateral pleural effusions.    < end of copied text >      EXAM:  CT ABDOMEN AND PELVIS OC                            PROCEDURE DATE:  09/27/2021          INTERPRETATION:  CLINICAL INFORMATION: Small bowel obstruction.    COMPARISON: None.    CONTRAST/COMPLICATIONS:  IV Contrast: NONE  Oral Contrast: Omnipaque 300  Complications: None reported at time of study completion    PROCEDURE:  CT of the Abdomen and Pelvis was performed.  Sagittal and coronal reformats were performed.    FINDINGS:  LOWER CHEST: Small bilateral pleural effusions with compressiveatelectasis of both lower lobes.    LIVER: Within normal limits.  BILE DUCTS: Normal caliber.  GALLBLADDER: Distended. Small layering gallstones.  SPLEEN: Within normal limits.  PANCREAS: Within normal limits.  ADRENALS: Within normal limits.  KIDNEYS/URETERS: No hydronephrosis. Nonobstructing left upper pole calculus, measuring 0.6 cm.    BLADDER: Urinary bladder contains air and a Castañeda catheter balloon.  REPRODUCTIVE ORGANS: Prostate is not enlarged.    BOWEL: No bowel obstruction. Appendix isnormal. Colonic diverticulosis.  PERITONEUM: Mild presacral fluid.  VESSELS: Atherosclerotic changes.  RETROPERITONEUM/LYMPH NODES: No lymphadenopathy.  ABDOMINAL WALL: Within normal limits.  BONES: Degenerative changes. Sternotomy.    IMPRESSION:  No acute intra-abdominal pathology.    Small bilateral pleural effusions with compressive atelectasis of both lower lobes.    ====================================================    EXAM:  MR FOOT RT                            PROCEDURE DATE:  09/17/2021          INTERPRETATION:  Clinical Information: Recent fifth metatarsal head resection now with fifth digit pain, swelling and foul discharge.    Comparison: Radiographs of the right foot from 9/16/2021 and MRI the right foot from 8/16/2021.    Technique:  MRI of the right midfoot and forefoot.  Intravenous Contrast: None.    Findings:    There is a resection at the mid aspect of the fifth metatarsal shaft. There is a lateral soft tissue wound beginning at the level of the amputation which extends distally. There is susceptibility artifact in the region of the amputation consistent with postoperative change. There is hyperintense T2 marrow signal throughout the remaining fifth metatarsal and within the adjacent fourth metatarsal head which is nonspecific and could be related to recent postoperative changes although osteomyelitis is suspected.    There is edema and mild atrophy within the plantar muscles of the foot. There is minimal spurring at the first metatarsophalangeal and hallux sesamoid articulations.    Impression:  Resection at the mid aspect of the fifth metatarsal. Lateral soft tissue wound with marrow signal abnormality throughout the fifth metatarsal and within the adjacent fourth metatarsal head which is nonspecific in the setting of recent surgery although osteomyelitis is suspected.        ASSESSMENT :     Headache    HTN (hypertension)    HLD (hyperlipidemia)    DM (diabetes mellitus)    CAD (coronary artery disease)    Glaucoma, angle-closure    [place] (hard of hearing)      S/P CABG (coronary artery bypass graft)    History of thyroid surgery        PLAN:    HPI:  76M from home, lives with daughter w/ PMH DM on insulin, HTN, HLD, CAD, 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 Pt with sharp pain on the R lateral foot. As per daughter, pt was taking tylenol which did not help his pain prompting the patient to ask his daughter to take him to the hospital. Pt is a poor historian. Daughter states that the patient did not see his podiatrist after the surgery. No fever, chest, pain, palpitations, nausea, vomiting, diarrhea (17 Sep 2021 01:11)      # PATIENT IS S/P ICU MONITORING AND RIGHT CHEST TUBE REMOVAL ON 10/15/2021     # COVID EXPOSURE ON 10/13 - ON CONTACT AND DROPLET ISOLATION PRECAUTION; F/U COVID PCR    # SUSPECT RIGHT FOOT OSTEOMYELITIS S/P RIGHT 5TH RAY RESECTION [8/19] AND S/P DEBRIDEMENT, GRAFT APPLICATION, BONE BX AND WOUND VAC APPLICATION 9/22 - BONE BX W/ ACUTE OSTEOMYELITIS AND NECROTIC PERIOSTEAL TISSUE  ** RECENT ADMISSION FOR RIGHT DIABETIC FOOT ULCER, CELLULITIS, OSTEOMYELITIS RIGHT 5th METATARSAL S/P RIGHT 5TH PARTIAL RAY AMPUTATION [8/20] - BONE PATHOLOGY W/ CLEAN MARGINS    - S/P VANCOMYCIN AND ZOSYN ; NOW ON UNASYN AND LEVAQUIN GIVEN OREN; PER ID SWITCH TO ZOSYN UNTIL 11/3 [NOW ON MEROPENEM]  - RECENTLY HAD SIMON W/ MILD PAD  - REVIEWED WOUND CX [ ENTEROCOCCUS, AEROMONAS, KLEBSIELLA] AND BCX  - ID CONSULT, PODIATRY CONSULT    - PICC LINE PLACED TO COMPLETE 6 WEEKS OF ANTIBIOTICS - ON MEROPENEM UNTIL 11/3 ON D/C    # ACUTE HYPOXIC RESPIRATORY FAILURE DUE TO ACUTE ON CHRONIC SYSTOLIC CHF  (  LV EF 40- 45 % ) , DIASTOLIC LV DYSFUNCTION , ,  SEVERE AORTIC STENOSIS & MODERATE MITRAL REGURGITATION  &  ASPIRATION PNA;  - S/P CHEST TUBE INSERTION AND REMOVAL  , S/P LASIX ,   CARDIOLOGY CONSULT IN PROGRESS      - CHEST TUBE PLACED [10/8] - FLUID CONSISTENT WITH TRANSUDATIVE PROCESS  - S/P EXTUBATION 10/13  - S/P CHEST TUBE REMOVAL 10/15      # SEPTIC SHOCK - ? S/T HYPOVOLEMIA VS. SEPSIS - S/P CVC [SWITCHED FROM FEMORAL TO LEFT IJ], S/P VASOPRESSORS - RESOLVING  - BCX - NGTD  - ON MEROPENEM      # TRANSIENT NEW ONSET A.FIB, PAROXYSMAL - ON ELIQUIS, CARDIOLOGY CONSULT IN PROGRESS    # FUNGURIA - D/C FLUCONAZOLE GIVEN TRANSAMINITIS    # TRANSAMINITIS - SUSPECT THIS IS ISCHEMIC HEPATITIS - IMPROVING - HEPATOLOGY CONSULT IN PROGRESS    # BOWEL DISTENTION - ? ILEUS - OPTIMIZING ELECTROLYTES - IMPROVED  - SURGERY CONSULT IN PROGRESS    # CHOLELITHIASIS - TRENDING LFTS, RUQ U/S - CHOLELITHIASIS, S/P SURGERY CONSULT   - S/P GI CONSULT - LESS SUSPICIOUS FOR CHOLECYSTITIS    # DYSPEPSIA - PLACED ON PPI BID WITH IMPROVEMENT, UNDERWENT ST EVAL     # ELEVATED TROPONINS - NSTEMI - ? DEMAND - WILL NEED ISCHEMIC EVAL ONCE ACUTE INFECTION RESOLVES PER CARDIOLOGY, CARDIOLOGY CONSULT IN PROGRESS  - ON ASA, STATIN, BB    # OREN ON CKD - S/T ATN AND URINARY RETENTION - S/P IVF, NOW W/ CASTAÑEDA, NEPHROLOGY CONUSLT IN PROGRESS  -  BPH ON FLOMAX, AND FINASTERIDE  - FAILED TOV WITH WORSENING RENAL FXN - NOTED URINARY RETENTION [10/4] - REPLACED CASTAÑEDA  - TOV 10/18 - BLADDER SCAN BID TO EVALUATE FOR URINARY RETENTION - FAILED TOV  - OVERNIGHT 10/18 - CASTAÑEDA REPLACED    # MEDICAL CLEARANCE FOR SURGERY - RCRI - 2 - 10.1 % 30 DAY RISK OF DEATH, MI OR CARDIAC ARREST  - CARDIOLOGY CONSULT     # DM -  HBA1C - 11  - LANTUS, SSI + FS    # CAD S/P CABG - PLACED ON ASA, STATIN, BB  - ECHO - SEVERE AORTIC STENOSIS, SEVERE CONCENTRIC LVH, G1DD, MILD CO  - CARDIOLOGY CONSULT - DR. BASSETT   - MAY NEED ISCHEMIC EVAL ONCE ACUTE ILLNESS RESOLVES INCLUDING CARDIAC CATHETERIZATION    # HTN, HLD  - ON METOPROLOL, NIFEDIPINE, STATIN       #  IMPAIRED GAIT DUE TO CERVICAL & LS SPONDYLOSIS, POLY ARTHRITIS AND DIABETIC PERIPHERAL NEUROPATHY, OP VITAMIN D DEFICIENCY - ON CHOLECALCIFEROL, OBTAIN PT & OT   #  FAILURE TO THRIVE , MODERATE PROTEIN CALORIE MALNUTRITION       # CASE DISCUSSED AT LENGTH WITH PATIENT AND DAUGHTER, BIRDIE ROBERT AT BEDSIDE AND VIA PHONE @ [phone] [10/5] - CASE DICUSSED AT LENGTH AND ALL QUESTIONS WERE ANSWERED. DAUGHTER UNDERSTOOD THAT PROGNOSIS IS GUARDED.  # PATIENT AND DAUGHTER REFUSED STEVAN ON PREVIOUS ADMISSION, PREVIOUSLY WISHED FOR PATIENT RETURN HOME W/ HOME PT; HOWEVER NOW, DAUGHTER WISHES FOR PATIENT TO GO TO [facility], AS PATIENT'S WIFE IS CURRENTLY ADMITTED THERE    # GI AND DVT PPX   `Patient is a 76y old  Male who presents with a chief complaint of R Foot Pain (20 Oct 2021 18:12)    PATIENT IS SEEN AND EXAMINED IN MEDICAL FLOOR.      ALLERGIES:  No Known Allergies      VITALS:    Vital Signs Last 24 Hrs  T(C): 36.4 (21 Oct 2021 04:45), Max: 36.5 (20 Oct 2021 20:27)  T(F): 97.5 (21 Oct 2021 04:45), Max: 97.7 (20 Oct 2021 20:27)  HR: 97 (21 Oct 2021 04:45) (77 - 97)  BP: 112/69 (21 Oct 2021 04:45) (112/69 - 144/72)  BP(mean): --  RR: 18 (21 Oct 2021 04:45) (18 - 18)  SpO2: 100% (21 Oct 2021 04:45) (100% - 100%)    LABS:    CBC Full  -  ( 21 Oct 2021 04:56 )  WBC Count : 4.77 K/uL  RBC Count : 3.24 M/uL  Hemoglobin : 9.6 g/dL  Hematocrit : 29.6 %  Platelet Count - Automated : 276 K/uL  Mean Cell Volume : 91.4 fl  Mean Cell Hemoglobin : 29.6 pg  Mean Cell Hemoglobin Concentration : 32.4 gm/dL  Auto Neutrophil # : x  Auto Lymphocyte # : x  Auto Monocyte # : x  Auto Eosinophil # : x  Auto Basophil # : x  Auto Neutrophil % : x  Auto Lymphocyte % : x  Auto Monocyte % : x  Auto Eosinophil % : x  Auto Basophil % : x      10-21    142  |  109<H>  |  11  ----------------------------<  106<H>  3.8   |  28  |  0.84    Ca    9.0      21 Oct 2021 04:56    TPro  6.8  /  Alb  2.4<L>  /  TBili  0.7  /  DBili  x   /  AST  31  /  ALT  50  /  AlkPhos  130<H>  10-21    CAPILLARY BLOOD GLUCOSE      POCT Blood Glucose.: 118 mg/dL (21 Oct 2021 07:04)  POCT Blood Glucose.: 110 mg/dL (21 Oct 2021 00:10)  POCT Blood Glucose.: 118 mg/dL (20 Oct 2021 21:07)  POCT Blood Glucose.: 126 mg/dL (20 Oct 2021 16:31)  POCT Blood Glucose.: 93 mg/dL (20 Oct 2021 11:51)        LIVER FUNCTIONS - ( 21 Oct 2021 04:56 )  Alb: 2.4 g/dL / Pro: 6.8 g/dL / ALK PHOS: 130 U/L / ALT: 50 U/L DA / AST: 31 U/L / GGT: x           Creatinine Trend: 0.84<--, 1.00<--, 0.97<--, 0.97<--, 1.05<--, 0.97<--  I&O's Summary    20 Oct 2021 07:01  -  21 Oct 2021 07:00  --------------------------------------------------------  IN: 0 mL / OUT: 1600 mL / NET: -1600 mL            .Body Fluid Pleural Fluid  10-08 @ 18:51   No growth at 1 week.  --  --      .Body Fluid Pleural Fluid  10-08 @ 18:34   No growth at 5 days  --    polymorphonuclear leukocytes seen  No organisms seen  by cytocentrifuge      .Tissue Right 5th toe r/o osteomyeltis  09-22 @ 13:54   No growth at 5 days  --    No polymorphonuclear cells seen per low power field  No organisms seen per oil power field      .Blood Blood-Venous  09-17 @ 10:28   No Growth Final  --  --      .Surgical Swab right foot wound  09-16 @ 21:42   Culture yields >4 types of aerobic and/or anaerobic bacteria  Call client services within 7 days if further workup is clinically  indicated. Culture includes  Rare Aeromonas hydrophila/caviae  Few Klebsiella oxytoca/Raoutella ornithinolytica  Few Enterococcus faecalis  Few Streptococcus agalactiae (Group B) isolated  Group B streptococci are susceptible to ampicillin,  penicillin and cefazolin, but may be resistant to  erythromycin and clindamycin.  Recommendations for intrapartum prophylaxis for Group B  streptococci are penicillin or ampicillin.  --  Aeromonas hydrophila/caviae  Klebsiella oxytoca /Raoutella ornithinolytica  Enterococcus faecalis      Bone R 5th metatarsal bone clean ma  08-20 @ 03:59   No growth at 5 days  --    No polymorphonuclear leukocytes seen per low power field  No organisms seen per oil power field      .Blood Blood-Venous  08-14 @ 21:09   No Growth Final  --  --      .Surgical Swab Right foot plantar wound  08-14 @ 21:08   Moderate Streptococcus agalactiae (Group B) isolated  Group B streptococci are susceptible to ampicillin,  penicillin and cefazolin, but may be resistant to  erythromycin and clindamycin.  Recommendations for intrapartum prophylaxis for Group B  streptococci are penicillin or ampicillin.  Moderate Bacteroides fragilis "Susceptibilities not performed"  Normal skin filiberto isolated  --  --          MEDICATIONS:    MEDICATIONS  (STANDING):  apixaban 5 milliGRAM(s) Oral two times a day  aspirin  chewable 81 milliGRAM(s) Oral daily  atorvastatin 80 milliGRAM(s) Oral at bedtime  chlorhexidine 2% Cloths 1 Application(s) Topical <User Schedule>  collagenase Ointment 1 Application(s) Topical daily  cyanocobalamin 1000 MICROGram(s) Oral daily  ergocalciferol 49841 Unit(s) Oral <User Schedule>  ferrous    sulfate Liquid 300 milliGRAM(s) Enteral Tube daily  finasteride 5 milliGRAM(s) Oral daily  gabapentin 100 milliGRAM(s) Oral three times a day  insulin lispro (ADMELOG) corrective regimen sliding scale   SubCutaneous every 6 hours  metoprolol tartrate 25 milliGRAM(s) Oral two times a day  NIFEdipine XL 60 milliGRAM(s) Oral daily  pantoprazole  Injectable 40 milliGRAM(s) IV Push at bedtime  piperacillin/tazobactam IVPB.. 3.375 Gram(s) IV Intermittent every 8 hours      MEDICATIONS  (PRN):  acetaminophen   Tablet .. 650 milliGRAM(s) Oral every 6 hours PRN Temp greater or equal to 38C (100.4F), Moderate Pain (4 - 6)  ondansetron Injectable 4 milliGRAM(s) IV Push three times a day PRN Nausea and/or Vomiting  sodium chloride 0.9% lock flush 10 milliLiter(s) IV Push every 1 hour PRN Pre/post blood products, medications, blood draw, and to maintain line patency      REVIEW OF SYSTEMS:                           ALL ROS DONE [ X   ]    CONSTITUTIONAL:  LETHARGIC [   ], FEVER [   ], UNRESPONSIVE [   ]  CVS:  CP  [   ], SOB, [   ], PALPITATIONS [   ], DIZZYNESS [   ]  RS: COUGH [   ], SPUTUM [   ]  GI: ABDOMINAL PAIN [   ], NAUSEA [   ], VOMITINGS [   ], DIARRHEA [   ], CONSTIPATION [   ]  :  DYSURIA [   ], NOCTURIA [   ], INCREASED FREQUENCY [   ], DRIBLING [   ],  SKELETAL: PAINFUL JOINTS [   ], SWOLLEN JOINTS [   ], NECK ACHE [   ], LOW BACK ACHE [   ],  SKIN : ULCERS [   ], RASH [   ], ITCHING [   ]  CNS: HEAD ACHE [   ], DOUBLE VISION [   ], BLURRED VISION [   ], AMS / CONFUSION [   ], SEIZURES [   ], WEAKNESS [   ],TINGLING / NUMBNESS [   ]      PHYSICAL EXAMINATION:    GENERAL APPEARANCE: NO DISTRESS  HEENT:  NO PALLOR, NO  JVD,  NO   NODES, NECK SUPPLE  CVS: S1 +, S2 +,   RS: AEEB,  OCCASIONAL  RALES +,  RONCHI +, CRACKLES + [IMPROVING]   ABD: SOFT, NT, NO, BS +       EXT: NO PE  SKIN: WARM,   RIGHT FOOT WRAPPED  SKELETAL:  ROM ACCEPTABLE  CNS:  AAO X  2-3        RADIOLOGY :    < from: Transthoracic Echocardiogram (10.07.21 @ 15:26) >  CONCLUSIONS:  1. Normal mitral valve. Moderate mitral regurgitation.  2. Calcified aortic valve with decreased opening, cannot  exclude bicuspid. Peak transaortic valve gradient equals  32.4 mm Hg, mean transaortic valve gradient equals 19 mm  Hg, dimensionless index 0.22, estimated aortic valve area  equals 0.6sqcm (by continuity equation), consistent with  paradoxical low-flow low-gradient severe aortic stenosis.  Consider dobutamine stress echocardiogram and/or SABIHA for  further evaluation.  No aortic valve regurgitation seen.  3. Normal aortic root.  4. Normal left atrium.  5. Moderate concentric left ventricular hypertrophy.  6. Moderate global left ventricular systolic dysfunction  (EF 40-45% by visual estimation).  7. Grade II diastolic dysfunction (moderate).  8. Normal right atrium.  9. Normal right ventricular size with decreased RV systolic  function (TAPSE 1.2 cm).  10. RV systolic pressure is borderline normal at  34 mm Hg.  11. Tricuspid valve not well seen. Trace tricuspid  regurgitation.  12. Normal pulmonic valve. Trace pulmonic insufficiency is  noted.  13. No pericardial effusion.  14. Bilateral pleural effusions.    < end of copied text >      EXAM:  CT ABDOMEN AND PELVIS OC                            PROCEDURE DATE:  09/27/2021          INTERPRETATION:  CLINICAL INFORMATION: Small bowel obstruction.    COMPARISON: None.    CONTRAST/COMPLICATIONS:  IV Contrast: NONE  Oral Contrast: Omnipaque 300  Complications: None reported at time of study completion    PROCEDURE:  CT of the Abdomen and Pelvis was performed.  Sagittal and coronal reformats were performed.    FINDINGS:  LOWER CHEST: Small bilateral pleural effusions with compressiveatelectasis of both lower lobes.    LIVER: Within normal limits.  BILE DUCTS: Normal caliber.  GALLBLADDER: Distended. Small layering gallstones.  SPLEEN: Within normal limits.  PANCREAS: Within normal limits.  ADRENALS: Within normal limits.  KIDNEYS/URETERS: No hydronephrosis. Nonobstructing left upper pole calculus, measuring 0.6 cm.    BLADDER: Urinary bladder contains air and a Castañeda catheter balloon.  REPRODUCTIVE ORGANS: Prostate is not enlarged.    BOWEL: No bowel obstruction. Appendix isnormal. Colonic diverticulosis.  PERITONEUM: Mild presacral fluid.  VESSELS: Atherosclerotic changes.  RETROPERITONEUM/LYMPH NODES: No lymphadenopathy.  ABDOMINAL WALL: Within normal limits.  BONES: Degenerative changes. Sternotomy.    IMPRESSION:  No acute intra-abdominal pathology.    Small bilateral pleural effusions with compressive atelectasis of both lower lobes.    ====================================================    EXAM:  MR FOOT RT                            PROCEDURE DATE:  09/17/2021          INTERPRETATION:  Clinical Information: Recent fifth metatarsal head resection now with fifth digit pain, swelling and foul discharge.    Comparison: Radiographs of the right foot from 9/16/2021 and MRI the right foot from 8/16/2021.    Technique:  MRI of the right midfoot and forefoot.  Intravenous Contrast: None.    Findings:    There is a resection at the mid aspect of the fifth metatarsal shaft. There is a lateral soft tissue wound beginning at the level of the amputation which extends distally. There is susceptibility artifact in the region of the amputation consistent with postoperative change. There is hyperintense T2 marrow signal throughout the remaining fifth metatarsal and within the adjacent fourth metatarsal head which is nonspecific and could be related to recent postoperative changes although osteomyelitis is suspected.    There is edema and mild atrophy within the plantar muscles of the foot. There is minimal spurring at the first metatarsophalangeal and hallux sesamoid articulations.    Impression:  Resection at the mid aspect of the fifth metatarsal. Lateral soft tissue wound with marrow signal abnormality throughout the fifth metatarsal and within the adjacent fourth metatarsal head which is nonspecific in the setting of recent surgery although osteomyelitis is suspected.        ASSESSMENT :     Headache    HTN (hypertension)    HLD (hyperlipidemia)    DM (diabetes mellitus)    CAD (coronary artery disease)    Glaucoma, angle-closure    [place] (hard of hearing)      S/P CABG (coronary artery bypass graft)    History of thyroid surgery        PLAN:    HPI:  76M from home, lives with daughter w/ PMH DM on insulin, HTN, HLD, CAD, 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 Pt with sharp pain on the R lateral foot. As per daughter, pt was taking tylenol which did not help his pain prompting the patient to ask his daughter to take him to the hospital. Pt is a poor historian. Daughter states that the patient did not see his podiatrist after the surgery. No fever, chest, pain, palpitations, nausea, vomiting, diarrhea (17 Sep 2021 01:11)      # PATIENT IS S/P ICU MONITORING AND RIGHT CHEST TUBE REMOVAL ON 10/15/2021     # COVID EXPOSURE ON 10/13 - ON CONTACT AND DROPLET ISOLATION PRECAUTION; F/U COVID PCR    # SUSPECT RIGHT FOOT OSTEOMYELITIS S/P RIGHT 5TH RAY RESECTION [8/19] AND S/P DEBRIDEMENT, GRAFT APPLICATION, BONE BX AND WOUND VAC APPLICATION 9/22 - BONE BX W/ ACUTE OSTEOMYELITIS AND NECROTIC PERIOSTEAL TISSUE  ** RECENT ADMISSION FOR RIGHT DIABETIC FOOT ULCER, CELLULITIS, OSTEOMYELITIS RIGHT 5th METATARSAL S/P RIGHT 5TH PARTIAL RAY AMPUTATION [8/20] - BONE PATHOLOGY W/ CLEAN MARGINS    - S/P VANCOMYCIN AND ZOSYN ; NOW ON UNASYN AND LEVAQUIN GIVEN OREN; PER ID SWITCH TO ZOSYN UNTIL 11/3   - RECENTLY HAD SIMON W/ MILD PAD  - REVIEWED WOUND CX [ ENTEROCOCCUS, AEROMONAS, KLEBSIELLA] AND BCX  - ID CONSULT, PODIATRY CONSULT    - PICC LINE PLACED TO COMPLETE 6 WEEKS OF ANTIBIOTICS - ON ZOSYN UNTIL 11/3 ON D/C    # ACUTE HYPOXIC RESPIRATORY FAILURE DUE TO ACUTE ON CHRONIC SYSTOLIC CHF  (  LV EF 40- 45 % ) , DIASTOLIC LV DYSFUNCTION , ,  SEVERE AORTIC STENOSIS & MODERATE MITRAL REGURGITATION  &  ASPIRATION PNA;  - S/P CHEST TUBE INSERTION AND REMOVAL  , S/P LASIX ,   CARDIOLOGY CONSULT IN PROGRESS      - CHEST TUBE PLACED [10/8] - FLUID CONSISTENT WITH TRANSUDATIVE PROCESS  - S/P EXTUBATION 10/13  - S/P CHEST TUBE REMOVAL 10/15      # SEPTIC SHOCK - ? S/T HYPOVOLEMIA VS. SEPSIS - S/P CVC [SWITCHED FROM FEMORAL TO LEFT IJ], S/P VASOPRESSORS - RESOLVING  - BCX - NGTD  - ON MEROPENEM      # TRANSIENT NEW ONSET A.FIB, PAROXYSMAL - ON ELIQUIS, CARDIOLOGY CONSULT IN PROGRESS    # FUNGURIA - D/C FLUCONAZOLE GIVEN TRANSAMINITIS    # TRANSAMINITIS - SUSPECT THIS IS ISCHEMIC HEPATITIS - IMPROVING - HEPATOLOGY CONSULT IN PROGRESS    # BOWEL DISTENTION - ? ILEUS - OPTIMIZING ELECTROLYTES - IMPROVED  - SURGERY CONSULT IN PROGRESS    # CHOLELITHIASIS - TRENDING LFTS, RUQ U/S - CHOLELITHIASIS, S/P SURGERY CONSULT   - S/P GI CONSULT - LESS SUSPICIOUS FOR CHOLECYSTITIS    # DYSPEPSIA - PLACED ON PPI BID WITH IMPROVEMENT, UNDERWENT ST EVAL     # ELEVATED TROPONINS - NSTEMI - ? DEMAND - WILL NEED ISCHEMIC EVAL ONCE ACUTE INFECTION RESOLVES PER CARDIOLOGY, CARDIOLOGY CONSULT IN PROGRESS  - ON ASA, STATIN, BB    # OREN ON CKD - S/T ATN AND URINARY RETENTION - S/P IVF, NOW W/ CASTAÑEDA, NEPHROLOGY CONUSLT IN PROGRESS  -  BPH ON FLOMAX, AND FINASTERIDE  - FAILED TOV WITH WORSENING RENAL FXN - NOTED URINARY RETENTION [10/4] - REPLACED CASTAÑEDA  - TOV 10/18 - BLADDER SCAN BID TO EVALUATE FOR URINARY RETENTION - FAILED TOV  - OVERNIGHT 10/18 - CASTAÑEDA REPLACED    # MEDICAL CLEARANCE FOR SURGERY - RCRI - 2 - 10.1 % 30 DAY RISK OF DEATH, MI OR CARDIAC ARREST  - CARDIOLOGY CONSULT     # DM -  HBA1C - 11  - LANTUS, SSI + FS    # CAD S/P CABG - PLACED ON ASA, STATIN, BB  - ECHO - SEVERE AORTIC STENOSIS, SEVERE CONCENTRIC LVH, G1DD, MILD UT  - CARDIOLOGY CONSULT - DR. BASSETT   - MAY NEED ISCHEMIC EVAL ONCE ACUTE ILLNESS RESOLVES INCLUDING CARDIAC CATHETERIZATION    # HTN, HLD  - ON METOPROLOL, NIFEDIPINE, STATIN       #  IMPAIRED GAIT DUE TO CERVICAL & LS SPONDYLOSIS, POLY ARTHRITIS AND DIABETIC PERIPHERAL NEUROPATHY, OP VITAMIN D DEFICIENCY - ON CHOLECALCIFEROL, OBTAIN PT & OT   #  FAILURE TO THRIVE , MODERATE PROTEIN CALORIE MALNUTRITION       # CASE DISCUSSED AT LENGTH WITH PATIENT AND DAUGHTER, BIRDIE ROBERT AT BEDSIDE AND VIA PHONE @ [phone] [10/5] - CASE DICUSSED AT LENGTH AND ALL QUESTIONS WERE ANSWERED. DAUGHTER UNDERSTOOD THAT PROGNOSIS IS GUARDED.  # PATIENT AND DAUGHTER REFUSED STEVAN ON PREVIOUS ADMISSION, PREVIOUSLY WISHED FOR PATIENT RETURN HOME W/ HOME PT; HOWEVER NOW, DAUGHTER WISHES FOR PATIENT TO GO TO [facility], AS PATIENT'S WIFE IS CURRENTLY ADMITTED THERE    # GI AND DVT PPX   5

## 2024-01-02 NOTE — PROGRESS NOTE ADULT - SUBJECTIVE AND OBJECTIVE BOX
C A R D I O L O G Y  **********************************    DATE OF SERVICE: 10-04-21    Patient denies chest pain or shortness of breath.   Review of symptoms otherwise negative.    ampicillin/sulbactam  IVPB 3 Gram(s) IV Intermittent every 6 hours  aspirin  chewable 81 milliGRAM(s) Oral daily  atorvastatin 80 milliGRAM(s) Oral at bedtime  bisacodyl Suppository 10 milliGRAM(s) Rectal once  chlorhexidine 2% Cloths 1 Application(s) Topical <User Schedule>  cyanocobalamin 1000 MICROGram(s) Oral daily  ergocalciferol 69199 Unit(s) Oral <User Schedule>  ferrous    sulfate 325 milliGRAM(s) Oral daily  finasteride 5 milliGRAM(s) Oral daily  fluconAZOLE IVPB      fluconAZOLE IVPB 100 milliGRAM(s) IV Intermittent every 24 hours  heparin   Injectable 5000 Unit(s) SubCutaneous every 8 hours  influenza   Vaccine 0.5 milliLiter(s) IntraMuscular once  insulin lispro (ADMELOG) corrective regimen sliding scale   SubCutaneous Before meals and at bedtime  levoFLOXacin  Tablet 250 milliGRAM(s) Oral every 24 hours  metoprolol succinate  milliGRAM(s) Oral daily  NIFEdipine XL 60 milliGRAM(s) Oral daily  ondansetron Injectable 4 milliGRAM(s) IV Push three times a day PRN  pantoprazole    Tablet 40 milliGRAM(s) Oral before breakfast  polyethylene glycol 3350 17 Gram(s) Oral daily  senna 2 Tablet(s) Oral at bedtime  sodium chloride 0.9% lock flush 10 milliLiter(s) IV Push every 1 hour PRN  tamsulosin 0.4 milliGRAM(s) Oral at bedtime                            8.9    5.87  )-----------( 291      ( 04 Oct 2021 06:10 )             27.4       Hemoglobin: 8.9 g/dL (10-04 @ 06:10)  Hemoglobin: 8.7 g/dL (10-03 @ 05:58)  Hemoglobin: 8.7 g/dL (10-02 @ 07:02)  Hemoglobin: 9.1 g/dL (10-01 @ 06:35)  Hemoglobin: 10.1 g/dL (09-30 @ 06:37)      10-04    141  |  103  |  27<H>  ----------------------------<  91  3.5   |  25  |  2.13<H>    Ca    8.4      04 Oct 2021 06:10      Creatinine Trend: 2.13<--, 1.70<--, 1.49<--, 1.48<--, 1.66<--, 1.73<--    COAGS:           T(C): 36.7 (10-04-21 @ 05:03), Max: 36.7 (10-03-21 @ 21:23)  HR: 71 (10-04-21 @ 09:30) (71 - 76)  BP: 116/48 (10-04-21 @ 09:30) (116/48 - 121/68)  RR: 18 (10-04-21 @ 09:30) (16 - 18)  SpO2: 92% (10-04-21 @ 09:30) (90% - 92%)  Wt(kg): --    I&O's Summary      HEENT:  (-)icterus (-)pallor  CV: N S1 S2 1/6 TRINIDAD (+)2 Pulses B/l  Resp:  Clear to ausculatation B/L, normal effort  GI: (+) BS Soft, NT, ND  Lymph:  (-)Edema, (-)obvious lymphadenopathy  Skin: Warm to touch, Normal turgor  Psych: Appropriate mood and affect      ASSESSMENT/PLAN: 	76y  Male PMH DM on insulin, HTN, HLD, normal lV fx moderate to severe AS 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    - monitor crt, nephrology f/u appreciated   - Podiatry f/u noted  - Abx per primary team  - Positive trop noted, likely due to increased demand and renal failure.  - He will need a SABIHA and R+L heart cath once he is medically optimized and infection treated  - Cont medical management of CAD  - oupt cardiac f/u [phone]      Zak Wilkins MD, Located within [facility]  BEEPER [phone]   Patient calling asking if he needs any labs done before visit w/ Dr PHILIPPE - please advise

## 2024-01-26 NOTE — PROGRESS NOTE ADULT - PROBLEM SELECTOR PROBLEM 6
12/14/23 was last f/u appt    Last filled Rx per  was 12/27/23     Discharge planning issues Prophylactic measure

## 2024-02-15 NOTE — PHYSICAL THERAPY INITIAL EVALUATION ADULT - MANUAL MUSCLE TESTING RESULTS, REHAB EVAL
[FreeTextEntry1] : Blood work was reviewed  Increase Vitamin D3 2000 IU/d Add fish oil  Continue to decrease eggs/cholesterol in diet. Low salt / low caffeine diet  Increase cardiovascular exercise  Derm evaluation for FBE  Health counselling provided  F/U with Pulmonary as routine names given  Trial of Protonix daily GERD diet discussed with patient f/ju 3-4 weeks  Annual CC 
(B)UE/LE grossly 4/5
Moving 4 limbs antigravity

## 2024-04-17 NOTE — PROGRESS NOTE ADULT - SUBJECTIVE AND OBJECTIVE BOX
C A R D I O L O G Y  **********************************     DATE OF SERVICE: 09-27-21    Events noted, distended abdomen now with SOB.  CXR c/w pulmonary congestion on NRB No CP    ampicillin/sulbactam  IVPB 3 Gram(s) IV Intermittent every 12 hours  atorvastatin 80 milliGRAM(s) Oral at bedtime  bisacodyl Suppository 10 milliGRAM(s) Rectal once  cyanocobalamin 1000 MICROGram(s) Oral daily  ergocalciferol 91771 Unit(s) Oral <User Schedule>  ferrous    sulfate 325 milliGRAM(s) Oral daily  fluconAZOLE IVPB      fluconAZOLE IVPB 100 milliGRAM(s) IV Intermittent every 24 hours  heparin   Injectable 5000 Unit(s) SubCutaneous every 8 hours  HYDROmorphone  Injectable 0.5 milliGRAM(s) IV Push daily PRN  influenza   Vaccine 0.5 milliLiter(s) IntraMuscular once  insulin lispro (ADMELOG) corrective regimen sliding scale   SubCutaneous Before meals and at bedtime  levoFLOXacin  Tablet 250 milliGRAM(s) Oral every 48 hours  metoprolol succinate  milliGRAM(s) Oral daily  NIFEdipine XL 60 milliGRAM(s) Oral daily  ondansetron Injectable 4 milliGRAM(s) IV Push three times a day PRN  polyethylene glycol 3350 17 Gram(s) Oral daily  senna 2 Tablet(s) Oral at bedtime  tamsulosin 0.4 milliGRAM(s) Oral at bedtime                            9.6    9.09  )-----------( 302      ( 27 Sep 2021 06:17 )             29.2       Hemoglobin: 9.6 g/dL (09-27 @ 06:17)  Hemoglobin: 8.9 g/dL (09-26 @ 07:23)  Hemoglobin: 9.5 g/dL (09-25 @ 06:40)  Hemoglobin: 9.4 g/dL (09-24 @ 06:15)  Hemoglobin: 9.3 g/dL (09-23 @ 06:28)      09-27    142  |  109<H>  |  49<H>  ----------------------------<  158<H>  3.9   |  21<L>  |  3.19<H>    Ca    8.5      27 Sep 2021 06:17  Mg     2.4     09-27    TPro  7.1  /  Alb  2.4<L>  /  TBili  0.4  /  DBili  x   /  AST  20  /  ALT  14  /  AlkPhos  103  09-27    Creatinine Trend: 3.19<--, 3.83<--, 4.05<--, 3.97<--, 3.20<--, 2.52<--    COAGS:     CARDIAC MARKERS ( 27 Sep 2021 11:04 )  1.460 ng/mL / x     / x     / x     / x            T(C): 36.8 (09-27-21 @ 12:10), Max: 36.8 (09-27-21 @ 12:10)  HR: 98 (09-27-21 @ 12:10) (88 - 98)  BP: 124/57 (09-27-21 @ 12:10) (117/54 - 126/59)  RR: 20 (09-27-21 @ 12:10) (19 - 20)  SpO2: 96% (09-27-21 @ 12:10) (90% - 98%)  Wt(kg): --    I&O's Summary    26 Sep 2021 07:01  -  27 Sep 2021 07:00  --------------------------------------------------------  IN: 300 mL / OUT: 700 mL / NET: -400 mL      HEENT:  (-)icterus (-)pallor  CV: N S1 S2 1/6 TRINIDAD (+)2 Pulses B/l  Resp:  Clear to ausculatation B/L, normal effort  GI: (+) BS Soft, NT, ND  Lymph:  (-)Edema, (-)obvious lymphadenopathy  Skin: Warm to touch, Normal turgor  Psych: Appropriate mood and affect      ASSESSMENT/PLAN: 	76y  Male PMH DM on insulin, HTN, HLD, normal lV fx moderate to severe AS 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    - Abx per primary team  - Lasix 80 IV x 1 now  - f/u MICU eval  - Surgery f/u  - keep net negative    Zak Wilkins MD, [facility]  BEEPER [phone]   - - -

## 2024-05-28 NOTE — PROGRESS NOTE ADULT - SUBJECTIVE AND OBJECTIVE BOX
C A R D I O L O G Y  **********************************     DATE OF SERVICE: 09-26-21    Has pain in right foot, some shortness of breath.  No chest pain.   Review of symptoms otherwise negative.    ampicillin/sulbactam  IVPB 3 Gram(s) IV Intermittent every 12 hours  atorvastatin 80 milliGRAM(s) Oral at bedtime  bisacodyl Suppository 10 milliGRAM(s) Rectal once  cyanocobalamin 1000 MICROGram(s) Oral daily  ergocalciferol 56438 Unit(s) Oral <User Schedule>  ferrous    sulfate 325 milliGRAM(s) Oral daily  fluconAZOLE IVPB      fluconAZOLE IVPB 100 milliGRAM(s) IV Intermittent every 24 hours  heparin   Injectable 5000 Unit(s) SubCutaneous every 8 hours  HYDROmorphone  Injectable 0.5 milliGRAM(s) IV Push daily PRN  influenza   Vaccine 0.5 milliLiter(s) IntraMuscular once  insulin lispro (ADMELOG) corrective regimen sliding scale   SubCutaneous Before meals and at bedtime  levoFLOXacin  Tablet 250 milliGRAM(s) Oral every 48 hours  metoprolol succinate  milliGRAM(s) Oral daily  NIFEdipine XL 60 milliGRAM(s) Oral daily  ondansetron Injectable 4 milliGRAM(s) IV Push three times a day PRN  oxyCODONE    IR 5 milliGRAM(s) Oral every 4 hours PRN  polyethylene glycol 3350 17 Gram(s) Oral daily  senna 2 Tablet(s) Oral at bedtime  sodium chloride 0.9%. 1000 milliLiter(s) IV Continuous <Continuous>  tamsulosin 0.4 milliGRAM(s) Oral at bedtime                        8.9    7.45  )-----------( 248      ( 26 Sep 2021 07:23 )             26.9       09-26    141  |  109<H>  |  47<H>  ----------------------------<  144<H>  4.1   |  19<L>  |  3.83<H>    Ca    8.3<L>      26 Sep 2021 07:23    TPro  6.8  /  Alb  2.3<L>  /  TBili  0.4  /  DBili  x   /  AST  24  /  ALT  13  /  AlkPhos  95  09-26    T(C): 36.7 (09-26-21 @ 04:49), Max: 36.9 (09-25-21 @ 20:02)  HR: 91 (09-26-21 @ 04:49) (83 - 91)  BP: 108/55 (09-26-21 @ 04:49) (102/49 - 108/55)  RR: 19 (09-26-21 @ 04:49) (18 - 19)  SpO2: 94% (09-26-21 @ 04:49) (94% - 95%)  Wt(kg): --    I&O's Summary    25 Sep 2021 07:01  -  26 Sep 2021 07:00  --------------------------------------------------------  IN: 1100 mL / OUT: 950 mL / NET: 150 mL    Gen: Appears well in NAD  HEENT:  (-)icterus (-)pallor  CV: N S1 S2 1/6 TRINIDAD (+)2 Pulses B/l  Resp:  Clear to ausculatation B/L, normal effort  GI: (+) BS Soft, NT, ND  Lymph:  (-)Edema, (-)obvious lymphadenopathy  Skin: Warm to touch, Normal turgor  Psych: Appropriate mood and affect      ASSESSMENT/PLAN: 	76y  Male PMH DM on insulin, HTN, HLD, normal lV fx moderate to severe AS 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    - Abx per primary team  - If SOB persists with repositioning, check CXR, and consider PE VQ scan.  - will need SABIHA for eval of AS as an outpt with His cardiologist at [facility]   - Progressive renal dysfunction no clinical CHF, renal f/u    Rob Wu M.D.  Cardiac Electrophysiology  [phone] moderate

## 2024-06-20 NOTE — DIETITIAN INITIAL EVALUATION ADULT. - PROBLEM SELECTOR PLAN 2
Lov 3/11/24, next 7/1/24  
A1c 11.4  h/o DM on - lantus 10 at home  c/w lantus 8 + sliding scale  Adjust insulin as indicated  FS ACHS   Maintain poct 140-180 while inpatient
detailed exam

## 2024-06-25 NOTE — PHYSICAL THERAPY INITIAL EVALUATION ADULT - RANGE OF MOTION EXAMINATION, REHAB EVAL
except for right DF limited to neutral due to c/o pain/discomfort/bilateral upper extremity ROM was WFL (within functional limits)/bilateral lower extremity ROM was WFL (within functional limits)
A+Ox4, increased time required for processing suspected

## 2025-02-16 NOTE — DIETITIAN NUTRITION RISK NOTIFICATION - MALNUTRITION EVALUATION AS DEMONSTRATED BY (ADULTS > 20 YEARS OF AGE)
Thank you for choosing [facility] [place] for [place] healthcare needs today!  Your CT scan showed evidence of acute sigmoid diverticulitis.  You have been sent Augmentin for treatment of this infection with additional Bentyl and Zofran for management of symptoms outpatient.  It is important to follow-up with a gastroenterologist after today's visit.  Please call to schedule an appointment with them.  A referral has been provided for you.  Return to the emergency department should symptoms change or worsen.   Inadequate energy intake.../Loss of subcutaneous fat.../Loss of muscle...

## 2025-02-19 NOTE — H&P ADULT - PROBLEM SELECTOR PLAN 1
Over the counter pain creams: Biofreeze, Bengay, tiger balm, two old goat, lidocaine gel,  Absorbine Veterinary Liniment Gel Topical Analgesic Sore Muscle and Joint Pain reliever     - p/w right foot ulcer and pain; non-complaint w/ medications  - PE: right foot fifth metatarsal plantar ulcer  - WBC wnl  - foot xray cellulitis  - s/p vanco, zosyn  - c/w vanco and zosyn    - podiatry consulted - p/w right foot ulcer and pain; non-complaint w/ medications  - PE: right foot fifth metatarsal plantar ulcer  - WBC wnl  - foot xray cellulitis  - s/p vanco, zosyn  - c/w unasyn    - podiatry consulted  - ID, Dr. Barrett, consulted - p/w right foot ulcer and pain; non-complaint w/ medications  - PE: right foot fifth metatarsal plantar ulcer  - WBC wnl  - foot xray cellulitis  - probe to bone  - s/p vanco, zosyn  - c/w vanco & zosyn, pain management  - f/u MR foot    - podiatry consulted  - ID, Dr. Barrett, consulted - p/w right foot ulcer and pain; non-complaint w/ medications  - PE: right foot fifth metatarsal plantar ulcer  - WBC wnl  - foot xray cellulitis  - probe to bone  - s/p vanco, zosyn  - c/w pain management  - hold abx until wound cx result per ID  - f/u MR foot, wound & blood cx    - podiatry onboard  - ID, Dr. Barrett, onboard

## 2025-03-29 NOTE — ED ADULT TRIAGE NOTE - TEMPERATURE IN FAHRENHEIT (DEGREES F)
Get Morgan, PharmD            98.3

## (undated) DEVICE — MASK OXYGEN PANORAMIC

## (undated) DEVICE — SOL INJ NS 0.9% 500ML 1-PORT

## (undated) DEVICE — TUBING CANNULA SALTER LABS NASAL ADULT 7FT

## (undated) DEVICE — BITE BLOCK MOUTHPCW/STRAP

## (undated) DEVICE — SOL INJ NS 0.9% 500ML 2 PORT

## (undated) DEVICE — SUCTION YANKAUER NO CONTROL VENT

## (undated) DEVICE — BALLOON US ENDO

## (undated) DEVICE — TUBING SUCTION 20FT

## (undated) DEVICE — VALVE ENDOSCOPE DEFENDO SINGLE USE

## (undated) DEVICE — TUBING IV SET GRAVITY 3Y 100" MACRO

## (undated) DEVICE — FOLEY HOLDER STATLOCK 2 WAY ADULT

## (undated) DEVICE — Device

## (undated) DEVICE — BITE BLOCK SCOPE SAVER 20X27MM ADULT GREEN

## (undated) DEVICE — SYR 60CC G MANOM

## (undated) DEVICE — CATH IV SAFE BC BLU 22GAX1.0"

## (undated) DEVICE — WRAP COMPRESSION CALF MED

## (undated) DEVICE — CATH IV SAFE BC PINK 20GA X 1.16"

## (undated) DEVICE — TUBING SUCTION CONN 6FT STERILE

## (undated) DEVICE — PACK IV START WITH CHG

## (undated) DEVICE — KIT ENDO PROCEDURE CUST W/VLV

## (undated) DEVICE — KIT ENDO PROCEDURE CUST 10/BX

## (undated) DEVICE — TUBING ENDO EXT OLYMPUS 160 24HR USE